# Patient Record
Sex: FEMALE | Race: BLACK OR AFRICAN AMERICAN | Employment: OTHER | URBAN - METROPOLITAN AREA
[De-identification: names, ages, dates, MRNs, and addresses within clinical notes are randomized per-mention and may not be internally consistent; named-entity substitution may affect disease eponyms.]

---

## 2017-01-27 PROBLEM — A41.9 LINE SEPSIS ASSOCIATED WITH DIALYSIS CATHETER (HCC): Status: ACTIVE | Noted: 2017-01-27

## 2017-01-27 PROBLEM — I15.0 RENOVASCULAR HYPERTENSION: Chronic | Status: ACTIVE | Noted: 2017-01-27

## 2017-01-27 PROBLEM — T82.7XXA LINE SEPSIS ASSOCIATED WITH DIALYSIS CATHETER (HCC): Status: ACTIVE | Noted: 2017-01-27

## 2017-02-08 PROBLEM — E10.10 DIABETIC KETOACIDOSIS WITHOUT COMA ASSOCIATED WITH TYPE 1 DIABETES MELLITUS (HCC): Status: ACTIVE | Noted: 2017-02-08

## 2017-10-17 PROBLEM — J81.1 PULMONARY EDEMA: Status: ACTIVE | Noted: 2017-10-17

## 2017-10-17 PROBLEM — T82.7XXA LINE SEPSIS ASSOCIATED WITH DIALYSIS CATHETER (HCC): Status: RESOLVED | Noted: 2017-01-27 | Resolved: 2017-10-17

## 2017-10-17 PROBLEM — A41.9 LINE SEPSIS ASSOCIATED WITH DIALYSIS CATHETER (HCC): Status: RESOLVED | Noted: 2017-01-27 | Resolved: 2017-10-17

## 2017-10-17 PROBLEM — I10 HTN (HYPERTENSION), BENIGN: Chronic | Status: ACTIVE | Noted: 2017-10-17

## 2018-02-19 PROBLEM — I77.0 AVF (ARTERIOVENOUS FISTULA) (HCC): Chronic | Status: ACTIVE | Noted: 2018-02-19

## 2018-03-30 ENCOUNTER — HOSPITAL ENCOUNTER (EMERGENCY)
Age: 33
Discharge: HOME | End: 2018-03-30
Attending: EMERGENCY MEDICINE
Payer: MEDICARE

## 2018-03-30 VITALS
WEIGHT: 135 LBS | TEMPERATURE: 97.8 F | RESPIRATION RATE: 18 BRPM | HEIGHT: 59 IN | DIASTOLIC BLOOD PRESSURE: 96 MMHG | OXYGEN SATURATION: 97 % | HEART RATE: 94 BPM | BODY MASS INDEX: 27.21 KG/M2 | SYSTOLIC BLOOD PRESSURE: 179 MMHG

## 2018-03-30 DIAGNOSIS — L02.91 ABSCESS: Primary | ICD-10-CM

## 2018-03-30 DIAGNOSIS — N72 CERVICITIS: ICD-10-CM

## 2018-03-30 LAB
CLUE CELLS: NORMAL
SOURCE WET PREP: NORMAL
TRICHOMONAS PREP: NORMAL
YEAST WET PREP: NORMAL

## 2018-03-30 PROCEDURE — 90715 TDAP VACCINE 7 YRS/> IM: CPT | Performed by: EMERGENCY MEDICINE

## 2018-03-30 PROCEDURE — 87491 CHLMYD TRACH DNA AMP PROBE: CPT

## 2018-03-30 PROCEDURE — 2500000003 HC RX 250 WO HCPCS: Performed by: EMERGENCY MEDICINE

## 2018-03-30 PROCEDURE — 87205 SMEAR GRAM STAIN: CPT

## 2018-03-30 PROCEDURE — 87186 SC STD MICRODIL/AGAR DIL: CPT

## 2018-03-30 PROCEDURE — 6370000000 HC RX 637 (ALT 250 FOR IP): Performed by: EMERGENCY MEDICINE

## 2018-03-30 PROCEDURE — 96372 THER/PROPH/DIAG INJ SC/IM: CPT

## 2018-03-30 PROCEDURE — 6360000002 HC RX W HCPCS: Performed by: EMERGENCY MEDICINE

## 2018-03-30 PROCEDURE — 99282 EMERGENCY DEPT VISIT SF MDM: CPT

## 2018-03-30 PROCEDURE — 10061 I&D ABSCESS COMP/MULTIPLE: CPT

## 2018-03-30 PROCEDURE — 2500000003 HC RX 250 WO HCPCS

## 2018-03-30 PROCEDURE — 87210 SMEAR WET MOUNT SALINE/INK: CPT

## 2018-03-30 PROCEDURE — 90471 IMMUNIZATION ADMIN: CPT | Performed by: EMERGENCY MEDICINE

## 2018-03-30 PROCEDURE — 87077 CULTURE AEROBIC IDENTIFY: CPT

## 2018-03-30 PROCEDURE — 87070 CULTURE OTHR SPECIMN AEROBIC: CPT

## 2018-03-30 PROCEDURE — 87591 N.GONORRHOEAE DNA AMP PROB: CPT

## 2018-03-30 RX ORDER — METRONIDAZOLE 500 MG/1
500 TABLET ORAL 2 TIMES DAILY
Qty: 14 TABLET | Refills: 0 | Status: SHIPPED | OUTPATIENT
Start: 2018-03-30 | End: 2018-04-06

## 2018-03-30 RX ORDER — AZITHROMYCIN 250 MG/1
1000 TABLET, FILM COATED ORAL ONCE
Status: COMPLETED | OUTPATIENT
Start: 2018-03-30 | End: 2018-03-30

## 2018-03-30 RX ORDER — ACETAMINOPHEN 325 MG/1
650 TABLET ORAL ONCE
Status: COMPLETED | OUTPATIENT
Start: 2018-03-30 | End: 2018-03-30

## 2018-03-30 RX ORDER — LIDOCAINE HYDROCHLORIDE 10 MG/ML
INJECTION, SOLUTION INFILTRATION; PERINEURAL
Status: COMPLETED
Start: 2018-03-30 | End: 2018-03-30

## 2018-03-30 RX ADMIN — LIDOCAINE HYDROCHLORIDE: 10 INJECTION, SOLUTION INFILTRATION; PERINEURAL at 17:40

## 2018-03-30 RX ADMIN — TETANUS TOXOID, REDUCED DIPHTHERIA TOXOID AND ACELLULAR PERTUSSIS VACCINE, ADSORBED 0.5 ML: 5; 2.5; 8; 8; 2.5 SUSPENSION INTRAMUSCULAR at 18:16

## 2018-03-30 RX ADMIN — AZITHROMYCIN 1000 MG: 250 TABLET, FILM COATED ORAL at 18:15

## 2018-03-30 RX ADMIN — CEFTRIAXONE SODIUM 250 MG: 250 INJECTION, POWDER, FOR SOLUTION INTRAMUSCULAR; INTRAVENOUS at 18:15

## 2018-03-30 RX ADMIN — ACETAMINOPHEN 650 MG: 325 TABLET ORAL at 18:14

## 2018-03-30 ASSESSMENT — ENCOUNTER SYMPTOMS
BACK PAIN: 0
WHEEZING: 0
EYE DISCHARGE: 0
SORE THROAT: 0
EYE REDNESS: 0
SHORTNESS OF BREATH: 0
NAUSEA: 0
VOMITING: 0
DIARRHEA: 0
EYE PAIN: 0
SINUS PRESSURE: 0
COUGH: 0
ABDOMINAL DISTENTION: 0

## 2018-03-30 ASSESSMENT — PAIN SCALES - GENERAL
PAINLEVEL_OUTOF10: 10
PAINLEVEL_OUTOF10: 6
PAINLEVEL_OUTOF10: 10

## 2018-03-30 ASSESSMENT — PAIN DESCRIPTION - PAIN TYPE: TYPE: ACUTE PAIN

## 2018-04-01 ENCOUNTER — APPOINTMENT (OUTPATIENT)
Dept: GENERAL RADIOLOGY | Age: 33
DRG: 871 | End: 2018-04-01
Payer: MEDICARE

## 2018-04-01 ENCOUNTER — HOSPITAL ENCOUNTER (INPATIENT)
Age: 33
LOS: 1 days | Discharge: LEFT BEFORE DISCHARGE | DRG: 871 | End: 2018-04-02
Attending: EMERGENCY MEDICINE | Admitting: INTERNAL MEDICINE
Payer: MEDICARE

## 2018-04-01 DIAGNOSIS — E87.5 HYPERKALEMIA: Primary | ICD-10-CM

## 2018-04-01 DIAGNOSIS — R73.9 HYPERGLYCEMIA: ICD-10-CM

## 2018-04-01 DIAGNOSIS — N18.6 CHRONIC KIDNEY DISEASE WITH END STAGE RENAL FAILURE ON DIALYSIS (HCC): ICD-10-CM

## 2018-04-01 DIAGNOSIS — Z99.2 CHRONIC KIDNEY DISEASE WITH END STAGE RENAL FAILURE ON DIALYSIS (HCC): ICD-10-CM

## 2018-04-01 PROBLEM — E11.00 TYPE 2 DIABETES MELLITUS WITH HYPEROSMOLAR NONKETOTIC HYPERGLYCEMIA (HCC): Status: ACTIVE | Noted: 2018-04-01

## 2018-04-01 LAB
ACETAMINOPHEN LEVEL: <15 MCG/ML (ref 10–30)
ALBUMIN SERPL-MCNC: 3.5 G/DL (ref 3.5–5.2)
ALP BLD-CCNC: 110 U/L (ref 35–104)
ALT SERPL-CCNC: 10 U/L (ref 0–32)
ANION GAP SERPL CALCULATED.3IONS-SCNC: 17 MMOL/L (ref 7–16)
ANION GAP SERPL CALCULATED.3IONS-SCNC: 19 MMOL/L (ref 7–16)
AST SERPL-CCNC: 17 U/L (ref 0–31)
BASOPHILS ABSOLUTE: 0.01 E9/L (ref 0–0.2)
BASOPHILS RELATIVE PERCENT: 0.2 % (ref 0–2)
BETA-HYDROXYBUTYRATE: 1.24 MMOL/L (ref 0.02–0.27)
BILIRUB SERPL-MCNC: 0.5 MG/DL (ref 0–1.2)
BUN BLDV-MCNC: 54 MG/DL (ref 6–20)
BUN BLDV-MCNC: 55 MG/DL (ref 6–20)
CALCIUM SERPL-MCNC: 8.5 MG/DL (ref 8.6–10.2)
CALCIUM SERPL-MCNC: 9.2 MG/DL (ref 8.6–10.2)
CHLORIDE BLD-SCNC: 74 MMOL/L (ref 98–107)
CHLORIDE BLD-SCNC: 81 MMOL/L (ref 98–107)
CHP ED QC CHECK: NORMAL
CHP ED QC CHECK: YES
CK MB: 1.8 NG/ML (ref 0–4.3)
CO2: 22 MMOL/L (ref 22–29)
CO2: 24 MMOL/L (ref 22–29)
CREAT SERPL-MCNC: 13.3 MG/DL (ref 0.5–1)
CREAT SERPL-MCNC: 13.6 MG/DL (ref 0.5–1)
EKG ATRIAL RATE: 89 BPM
EKG P AXIS: 62 DEGREES
EKG P-R INTERVAL: 142 MS
EKG Q-T INTERVAL: 374 MS
EKG QRS DURATION: 76 MS
EKG QTC CALCULATION (BAZETT): 455 MS
EKG R AXIS: 3 DEGREES
EKG T AXIS: 75 DEGREES
EKG VENTRICULAR RATE: 89 BPM
EOSINOPHILS ABSOLUTE: 0.08 E9/L (ref 0.05–0.5)
EOSINOPHILS RELATIVE PERCENT: 1.5 % (ref 0–6)
ETHANOL: <10 MG/DL (ref 0–0.08)
GFR AFRICAN AMERICAN: 4
GFR AFRICAN AMERICAN: 4
GFR NON-AFRICAN AMERICAN: 4 ML/MIN/1.73
GFR NON-AFRICAN AMERICAN: 4 ML/MIN/1.73
GLUCOSE BLD-MCNC: 1218 MG/DL (ref 74–109)
GLUCOSE BLD-MCNC: 621 MG/DL (ref 74–109)
GLUCOSE BLD-MCNC: NORMAL MG/DL
GLUCOSE BLD-MCNC: NORMAL MG/DL
HBA1C MFR BLD: 9.6 % (ref 4.8–5.9)
HCT VFR BLD CALC: 30.6 % (ref 34–48)
HEMOGLOBIN: 10.9 G/DL (ref 11.5–15.5)
IMMATURE GRANULOCYTES #: 0.01 E9/L
IMMATURE GRANULOCYTES %: 0.2 % (ref 0–5)
IRON SATURATION: 18 % (ref 15–50)
IRON: 26 MCG/DL (ref 37–145)
LACTIC ACID: 1.3 MMOL/L (ref 0.5–2.2)
LYMPHOCYTES ABSOLUTE: 0.83 E9/L (ref 1.5–4)
LYMPHOCYTES RELATIVE PERCENT: 15.6 % (ref 20–42)
MAGNESIUM: 2.2 MG/DL (ref 1.6–2.6)
MCH RBC QN AUTO: 31 PG (ref 26–35)
MCHC RBC AUTO-ENTMCNC: 35.6 % (ref 32–34.5)
MCV RBC AUTO: 86.9 FL (ref 80–99.9)
METER GLUCOSE: 106 MG/DL (ref 70–110)
METER GLUCOSE: 120 MG/DL (ref 70–110)
METER GLUCOSE: 155 MG/DL (ref 70–110)
METER GLUCOSE: 265 MG/DL (ref 70–110)
METER GLUCOSE: 424 MG/DL (ref 70–110)
METER GLUCOSE: >500 MG/DL (ref 70–110)
MONOCYTES ABSOLUTE: 0.38 E9/L (ref 0.1–0.95)
MONOCYTES RELATIVE PERCENT: 7.1 % (ref 2–12)
NEUTROPHILS ABSOLUTE: 4.01 E9/L (ref 1.8–7.3)
NEUTROPHILS RELATIVE PERCENT: 75.4 % (ref 43–80)
OSMOLALITY: 321 MOSM/KG (ref 285–310)
PDW BLD-RTO: 14.9 FL (ref 11.5–15)
PHOSPHORUS: 4.9 MG/DL (ref 2.5–4.5)
PLATELET # BLD: 201 E9/L (ref 130–450)
PMV BLD AUTO: 9.6 FL (ref 7–12)
POTASSIUM REFLEX MAGNESIUM: 4.6 MMOL/L (ref 3.5–5)
POTASSIUM SERPL-SCNC: 6.9 MMOL/L (ref 3.5–5)
PROCALCITONIN: 2.46 NG/ML (ref 0–0.08)
RBC # BLD: 3.52 E12/L (ref 3.5–5.5)
SALICYLATE, SERUM: <0.3 MG/DL (ref 0–30)
SODIUM BLD-SCNC: 113 MMOL/L (ref 132–146)
SODIUM BLD-SCNC: 124 MMOL/L (ref 132–146)
TOTAL CK: 244 U/L (ref 20–180)
TOTAL IRON BINDING CAPACITY: 143 MCG/DL (ref 250–450)
TOTAL PROTEIN: 7.7 G/DL (ref 6.4–8.3)
TRICYCLIC ANTIDEPRESSANTS SCREEN SERUM: NEGATIVE NG/ML
TROPONIN: 0.02 NG/ML (ref 0–0.03)
TROPONIN: 0.02 NG/ML (ref 0–0.03)
TROPONIN: 0.03 NG/ML (ref 0–0.03)
WBC # BLD: 5.3 E9/L (ref 4.5–11.5)

## 2018-04-01 PROCEDURE — 6360000002 HC RX W HCPCS: Performed by: INTERNAL MEDICINE

## 2018-04-01 PROCEDURE — 84484 ASSAY OF TROPONIN QUANT: CPT

## 2018-04-01 PROCEDURE — 6370000000 HC RX 637 (ALT 250 FOR IP): Performed by: EMERGENCY MEDICINE

## 2018-04-01 PROCEDURE — 96375 TX/PRO/DX INJ NEW DRUG ADDON: CPT

## 2018-04-01 PROCEDURE — 83735 ASSAY OF MAGNESIUM: CPT

## 2018-04-01 PROCEDURE — 82550 ASSAY OF CK (CPK): CPT

## 2018-04-01 PROCEDURE — 83550 IRON BINDING TEST: CPT

## 2018-04-01 PROCEDURE — 5A1D70Z PERFORMANCE OF URINARY FILTRATION, INTERMITTENT, LESS THAN 6 HOURS PER DAY: ICD-10-PCS | Performed by: INTERNAL MEDICINE

## 2018-04-01 PROCEDURE — 82553 CREATINE MB FRACTION: CPT

## 2018-04-01 PROCEDURE — 90935 HEMODIALYSIS ONE EVALUATION: CPT | Performed by: INTERNAL MEDICINE

## 2018-04-01 PROCEDURE — 36415 COLL VENOUS BLD VENIPUNCTURE: CPT

## 2018-04-01 PROCEDURE — 83540 ASSAY OF IRON: CPT

## 2018-04-01 PROCEDURE — 87040 BLOOD CULTURE FOR BACTERIA: CPT

## 2018-04-01 PROCEDURE — 2580000003 HC RX 258: Performed by: INTERNAL MEDICINE

## 2018-04-01 PROCEDURE — 83930 ASSAY OF BLOOD OSMOLALITY: CPT

## 2018-04-01 PROCEDURE — 6370000000 HC RX 637 (ALT 250 FOR IP): Performed by: INTERNAL MEDICINE

## 2018-04-01 PROCEDURE — 99223 1ST HOSP IP/OBS HIGH 75: CPT | Performed by: INTERNAL MEDICINE

## 2018-04-01 PROCEDURE — 99285 EMERGENCY DEPT VISIT HI MDM: CPT

## 2018-04-01 PROCEDURE — 96374 THER/PROPH/DIAG INJ IV PUSH: CPT

## 2018-04-01 PROCEDURE — 80048 BASIC METABOLIC PNL TOTAL CA: CPT

## 2018-04-01 PROCEDURE — 2000000000 HC ICU R&B

## 2018-04-01 PROCEDURE — 84145 PROCALCITONIN (PCT): CPT

## 2018-04-01 PROCEDURE — 83036 HEMOGLOBIN GLYCOSYLATED A1C: CPT

## 2018-04-01 PROCEDURE — 80307 DRUG TEST PRSMV CHEM ANLYZR: CPT

## 2018-04-01 PROCEDURE — 82010 KETONE BODYS QUAN: CPT

## 2018-04-01 PROCEDURE — 82962 GLUCOSE BLOOD TEST: CPT

## 2018-04-01 PROCEDURE — 6360000002 HC RX W HCPCS: Performed by: EMERGENCY MEDICINE

## 2018-04-01 PROCEDURE — 93005 ELECTROCARDIOGRAM TRACING: CPT | Performed by: EMERGENCY MEDICINE

## 2018-04-01 PROCEDURE — 2500000003 HC RX 250 WO HCPCS: Performed by: EMERGENCY MEDICINE

## 2018-04-01 PROCEDURE — 83605 ASSAY OF LACTIC ACID: CPT

## 2018-04-01 PROCEDURE — 84100 ASSAY OF PHOSPHORUS: CPT

## 2018-04-01 PROCEDURE — 80053 COMPREHEN METABOLIC PANEL: CPT

## 2018-04-01 PROCEDURE — 2580000003 HC RX 258: Performed by: EMERGENCY MEDICINE

## 2018-04-01 PROCEDURE — 85025 COMPLETE CBC W/AUTO DIFF WBC: CPT

## 2018-04-01 PROCEDURE — 71045 X-RAY EXAM CHEST 1 VIEW: CPT

## 2018-04-01 RX ORDER — SODIUM CHLORIDE 0.9 % (FLUSH) 0.9 %
10 SYRINGE (ML) INJECTION EVERY 12 HOURS SCHEDULED
Status: DISCONTINUED | OUTPATIENT
Start: 2018-04-01 | End: 2018-04-02 | Stop reason: HOSPADM

## 2018-04-01 RX ORDER — HEPARIN SODIUM 10000 [USP'U]/ML
5000 INJECTION, SOLUTION INTRAVENOUS; SUBCUTANEOUS EVERY 8 HOURS SCHEDULED
Status: DISCONTINUED | OUTPATIENT
Start: 2018-04-01 | End: 2018-04-02 | Stop reason: HOSPADM

## 2018-04-01 RX ORDER — DEXTROSE MONOHYDRATE 50 MG/ML
100 INJECTION, SOLUTION INTRAVENOUS PRN
Status: DISCONTINUED | OUTPATIENT
Start: 2018-04-01 | End: 2018-04-02 | Stop reason: HOSPADM

## 2018-04-01 RX ORDER — ACETAMINOPHEN 325 MG/1
650 TABLET ORAL EVERY 4 HOURS PRN
Status: DISCONTINUED | OUTPATIENT
Start: 2018-04-01 | End: 2018-04-02 | Stop reason: HOSPADM

## 2018-04-01 RX ORDER — ONDANSETRON 2 MG/ML
4 INJECTION INTRAMUSCULAR; INTRAVENOUS ONCE
Status: COMPLETED | OUTPATIENT
Start: 2018-04-01 | End: 2018-04-01

## 2018-04-01 RX ORDER — AMLODIPINE BESYLATE 5 MG/1
5 TABLET ORAL DAILY
Status: DISCONTINUED | OUTPATIENT
Start: 2018-04-01 | End: 2018-04-02 | Stop reason: HOSPADM

## 2018-04-01 RX ORDER — NICOTINE POLACRILEX 4 MG
15 LOZENGE BUCCAL PRN
Status: DISCONTINUED | OUTPATIENT
Start: 2018-04-01 | End: 2018-04-02 | Stop reason: HOSPADM

## 2018-04-01 RX ORDER — ONDANSETRON 2 MG/ML
4 INJECTION INTRAMUSCULAR; INTRAVENOUS EVERY 6 HOURS PRN
Status: DISCONTINUED | OUTPATIENT
Start: 2018-04-01 | End: 2018-04-02 | Stop reason: HOSPADM

## 2018-04-01 RX ORDER — DEXTROSE AND SODIUM CHLORIDE 5; .45 G/100ML; G/100ML
INJECTION, SOLUTION INTRAVENOUS PRN
Status: DISCONTINUED | OUTPATIENT
Start: 2018-04-01 | End: 2018-04-02 | Stop reason: HOSPADM

## 2018-04-01 RX ORDER — SODIUM CHLORIDE 0.9 % (FLUSH) 0.9 %
10 SYRINGE (ML) INJECTION PRN
Status: DISCONTINUED | OUTPATIENT
Start: 2018-04-01 | End: 2018-04-02 | Stop reason: HOSPADM

## 2018-04-01 RX ORDER — DEXTROSE MONOHYDRATE 25 G/50ML
12.5 INJECTION, SOLUTION INTRAVENOUS PRN
Status: DISCONTINUED | OUTPATIENT
Start: 2018-04-01 | End: 2018-04-02 | Stop reason: HOSPADM

## 2018-04-01 RX ORDER — CARVEDILOL 25 MG/1
25 TABLET ORAL 2 TIMES DAILY WITH MEALS
Status: DISCONTINUED | OUTPATIENT
Start: 2018-04-01 | End: 2018-04-02 | Stop reason: HOSPADM

## 2018-04-01 RX ORDER — CALCIUM GLUCONATE 94 MG/ML
1 INJECTION, SOLUTION INTRAVENOUS ONCE
Status: COMPLETED | OUTPATIENT
Start: 2018-04-01 | End: 2018-04-01

## 2018-04-01 RX ORDER — INSULIN GLARGINE 100 [IU]/ML
15 INJECTION, SOLUTION SUBCUTANEOUS NIGHTLY
Status: DISCONTINUED | OUTPATIENT
Start: 2018-04-01 | End: 2018-04-02 | Stop reason: HOSPADM

## 2018-04-01 RX ORDER — CHOLECALCIFEROL (VITAMIN D3) 10 MCG
1 TABLET ORAL DAILY
Status: DISCONTINUED | OUTPATIENT
Start: 2018-04-01 | End: 2018-04-02 | Stop reason: HOSPADM

## 2018-04-01 RX ORDER — HEPARIN SODIUM 1000 [USP'U]/ML
4300 INJECTION, SOLUTION INTRAVENOUS; SUBCUTANEOUS PRN
Status: DISCONTINUED | OUTPATIENT
Start: 2018-04-01 | End: 2018-04-02 | Stop reason: HOSPADM

## 2018-04-01 RX ORDER — CHOLECALCIFEROL (VITAMIN D3) 50 MCG
2000 TABLET ORAL DAILY
Status: DISCONTINUED | OUTPATIENT
Start: 2018-04-01 | End: 2018-04-02 | Stop reason: HOSPADM

## 2018-04-01 RX ORDER — SODIUM CHLORIDE 9 MG/ML
INJECTION, SOLUTION INTRAVENOUS CONTINUOUS
Status: DISCONTINUED | OUTPATIENT
Start: 2018-04-01 | End: 2018-04-02 | Stop reason: HOSPADM

## 2018-04-01 RX ORDER — FAMOTIDINE 20 MG/1
20 TABLET, FILM COATED ORAL DAILY
Status: DISCONTINUED | OUTPATIENT
Start: 2018-04-01 | End: 2018-04-02 | Stop reason: HOSPADM

## 2018-04-01 RX ADMIN — INSULIN HUMAN 6 UNITS: 100 INJECTION, SOLUTION PARENTERAL at 12:52

## 2018-04-01 RX ADMIN — AMLODIPINE BESYLATE 5 MG: 5 TABLET ORAL at 17:02

## 2018-04-01 RX ADMIN — INSULIN GLARGINE 15 UNITS: 100 INJECTION, SOLUTION SUBCUTANEOUS at 20:36

## 2018-04-01 RX ADMIN — FAMOTIDINE 20 MG: 20 TABLET ORAL at 17:02

## 2018-04-01 RX ADMIN — HEPARIN SODIUM 5000 UNITS: 10000 INJECTION, SOLUTION INTRAVENOUS; SUBCUTANEOUS at 23:17

## 2018-04-01 RX ADMIN — CARVEDILOL 25 MG: 25 TABLET, FILM COATED ORAL at 18:03

## 2018-04-01 RX ADMIN — CALCIUM GLUCONATE 1 G: 98 INJECTION, SOLUTION INTRAVENOUS at 12:20

## 2018-04-01 RX ADMIN — HEPARIN SODIUM 4300 UNITS: 1000 INJECTION, SOLUTION INTRAVENOUS; SUBCUTANEOUS at 23:50

## 2018-04-01 RX ADMIN — Medication 10 ML: at 17:02

## 2018-04-01 RX ADMIN — VANCOMYCIN HYDROCHLORIDE 1250 MG: 1 INJECTION, POWDER, LYOPHILIZED, FOR SOLUTION INTRAVENOUS at 18:35

## 2018-04-01 RX ADMIN — SODIUM CHLORIDE: 9 INJECTION, SOLUTION INTRAVENOUS at 20:37

## 2018-04-01 RX ADMIN — ONDANSETRON 4 MG: 2 INJECTION INTRAMUSCULAR; INTRAVENOUS at 11:41

## 2018-04-01 RX ADMIN — SODIUM CHLORIDE 6 UNITS/HR: 9 INJECTION, SOLUTION INTRAVENOUS at 12:54

## 2018-04-01 RX ADMIN — WATER 1 G: 1 INJECTION INTRAMUSCULAR; INTRAVENOUS; SUBCUTANEOUS at 18:05

## 2018-04-01 RX ADMIN — SODIUM BICARBONATE 50 MEQ: 84 INJECTION, SOLUTION INTRAVENOUS at 12:52

## 2018-04-01 ASSESSMENT — PAIN SCALES - GENERAL
PAINLEVEL_OUTOF10: 0

## 2018-04-02 ENCOUNTER — APPOINTMENT (OUTPATIENT)
Dept: GENERAL RADIOLOGY | Age: 33
DRG: 871 | End: 2018-04-02
Payer: MEDICARE

## 2018-04-02 VITALS
WEIGHT: 136.69 LBS | HEART RATE: 97 BPM | DIASTOLIC BLOOD PRESSURE: 105 MMHG | SYSTOLIC BLOOD PRESSURE: 166 MMHG | RESPIRATION RATE: 17 BRPM | BODY MASS INDEX: 27.56 KG/M2 | OXYGEN SATURATION: 97 % | HEIGHT: 59 IN | TEMPERATURE: 99 F

## 2018-04-02 ASSESSMENT — PAIN SCALES - GENERAL: PAINLEVEL_OUTOF10: 0

## 2018-04-03 LAB
GRAM STAIN RESULT: ABNORMAL
ORGANISM: ABNORMAL
WOUND/ABSCESS: ABNORMAL

## 2018-04-05 LAB
CHLAMYDIA TRACHOMATIS AMPLIFIED DET: NORMAL
N GONORRHOEAE AMPLIFIED DET: NORMAL

## 2018-04-06 LAB
BLOOD CULTURE, ROUTINE: NORMAL
CULTURE, BLOOD 2: NORMAL

## 2018-04-17 ENCOUNTER — HOSPITAL ENCOUNTER (OUTPATIENT)
Age: 33
Discharge: HOME | End: 2018-04-17
Payer: COMMERCIAL

## 2018-04-17 PROCEDURE — 84132 ASSAY OF SERUM POTASSIUM: CPT

## 2018-04-17 PROCEDURE — 36415 COLL VENOUS BLD VENIPUNCTURE: CPT

## 2018-04-20 LAB — POTASSIUM SERPL-SCNC: 3.7 MMOL/L (ref 3.5–5)

## 2018-05-07 ENCOUNTER — HOSPITAL ENCOUNTER (INPATIENT)
Age: 33
LOS: 6 days | Discharge: HOME | DRG: 264 | End: 2018-05-13
Attending: EMERGENCY MEDICINE | Admitting: INTERNAL MEDICINE
Payer: MEDICARE

## 2018-05-07 ENCOUNTER — APPOINTMENT (OUTPATIENT)
Dept: ULTRASOUND IMAGING | Age: 33
DRG: 264 | End: 2018-05-07
Payer: MEDICARE

## 2018-05-07 ENCOUNTER — APPOINTMENT (OUTPATIENT)
Dept: GENERAL RADIOLOGY | Age: 33
DRG: 264 | End: 2018-05-07
Payer: MEDICARE

## 2018-05-07 DIAGNOSIS — N18.6 ESRD ON DIALYSIS (HCC): ICD-10-CM

## 2018-05-07 DIAGNOSIS — T80.219A CENTRAL VENOUS LINE INFECTION, INITIAL ENCOUNTER: Primary | ICD-10-CM

## 2018-05-07 DIAGNOSIS — Z99.2 ESRD ON DIALYSIS (HCC): ICD-10-CM

## 2018-05-07 PROBLEM — I77.0 AVF (ARTERIOVENOUS FISTULA) (HCC): Chronic | Status: RESOLVED | Noted: 2018-02-19 | Resolved: 2018-05-07

## 2018-05-07 PROBLEM — R73.9 HYPERGLYCEMIA: Status: RESOLVED | Noted: 2018-04-01 | Resolved: 2018-05-07

## 2018-05-07 PROBLEM — A41.9 SEPSIS (HCC): Status: ACTIVE | Noted: 2018-05-07

## 2018-05-07 PROBLEM — E11.00 TYPE 2 DIABETES MELLITUS WITH HYPEROSMOLAR NONKETOTIC HYPERGLYCEMIA (HCC): Status: RESOLVED | Noted: 2018-04-01 | Resolved: 2018-05-07

## 2018-05-07 PROBLEM — J81.1 PULMONARY EDEMA: Status: RESOLVED | Noted: 2017-10-17 | Resolved: 2018-05-07

## 2018-05-07 PROBLEM — I82.721 CHRONIC DEEP VEIN THROMBOSIS (DVT) OF RIGHT UPPER EXTREMITY (HCC): Chronic | Status: ACTIVE | Noted: 2018-05-07

## 2018-05-07 PROBLEM — I15.0 RENOVASCULAR HYPERTENSION: Chronic | Status: RESOLVED | Noted: 2017-01-27 | Resolved: 2018-05-07

## 2018-05-07 LAB
ANION GAP SERPL CALCULATED.3IONS-SCNC: 16 MMOL/L (ref 7–16)
APTT: 33.3 SEC (ref 24.5–35.1)
BASOPHILS ABSOLUTE: 0.01 E9/L (ref 0–0.2)
BASOPHILS RELATIVE PERCENT: 0.2 % (ref 0–2)
BUN BLDV-MCNC: 46 MG/DL (ref 6–20)
C-REACTIVE PROTEIN: 1.2 MG/DL (ref 0–0.4)
CALCIUM SERPL-MCNC: 9.4 MG/DL (ref 8.6–10.2)
CHLORIDE BLD-SCNC: 94 MMOL/L (ref 98–107)
CO2: 28 MMOL/L (ref 22–29)
CREAT SERPL-MCNC: 13.1 MG/DL (ref 0.5–1)
EOSINOPHILS ABSOLUTE: 0.23 E9/L (ref 0.05–0.5)
EOSINOPHILS RELATIVE PERCENT: 3.8 % (ref 0–6)
GFR AFRICAN AMERICAN: 4
GFR NON-AFRICAN AMERICAN: 4 ML/MIN/1.73
GLUCOSE BLD-MCNC: 171 MG/DL (ref 74–109)
HBA1C MFR BLD: 9.7 % (ref 4.8–5.9)
HCT VFR BLD CALC: 28.6 % (ref 34–48)
HCT VFR BLD CALC: 30.4 % (ref 34–48)
HEMOGLOBIN: 10.5 G/DL (ref 11.5–15.5)
HEMOGLOBIN: 9.8 G/DL (ref 11.5–15.5)
IMMATURE GRANULOCYTES #: 0.03 E9/L
IMMATURE GRANULOCYTES %: 0.5 % (ref 0–5)
LYMPHOCYTES ABSOLUTE: 1.87 E9/L (ref 1.5–4)
LYMPHOCYTES RELATIVE PERCENT: 31.2 % (ref 20–42)
MCH RBC QN AUTO: 30 PG (ref 26–35)
MCH RBC QN AUTO: 30.5 PG (ref 26–35)
MCHC RBC AUTO-ENTMCNC: 34.3 % (ref 32–34.5)
MCHC RBC AUTO-ENTMCNC: 34.5 % (ref 32–34.5)
MCV RBC AUTO: 87.5 FL (ref 80–99.9)
MCV RBC AUTO: 88.4 FL (ref 80–99.9)
METER GLUCOSE: 154 MG/DL (ref 70–110)
METER GLUCOSE: 282 MG/DL (ref 70–110)
MONOCYTES ABSOLUTE: 0.39 E9/L (ref 0.1–0.95)
MONOCYTES RELATIVE PERCENT: 6.5 % (ref 2–12)
NEUTROPHILS ABSOLUTE: 3.46 E9/L (ref 1.8–7.3)
NEUTROPHILS RELATIVE PERCENT: 57.8 % (ref 43–80)
PDW BLD-RTO: 15.1 FL (ref 11.5–15)
PDW BLD-RTO: 15.2 FL (ref 11.5–15)
PLATELET # BLD: 263 E9/L (ref 130–450)
PLATELET # BLD: 293 E9/L (ref 130–450)
PMV BLD AUTO: 10.2 FL (ref 7–12)
PMV BLD AUTO: 9.3 FL (ref 7–12)
POTASSIUM SERPL-SCNC: 4.7 MMOL/L (ref 3.5–5)
RBC # BLD: 3.27 E12/L (ref 3.5–5.5)
RBC # BLD: 3.44 E12/L (ref 3.5–5.5)
SEDIMENTATION RATE, ERYTHROCYTE: 57 MM/HR (ref 0–20)
SODIUM BLD-SCNC: 138 MMOL/L (ref 132–146)
WBC # BLD: 5.6 E9/L (ref 4.5–11.5)
WBC # BLD: 6 E9/L (ref 4.5–11.5)

## 2018-05-07 PROCEDURE — 2580000003 HC RX 258: Performed by: INTERNAL MEDICINE

## 2018-05-07 PROCEDURE — 99221 1ST HOSP IP/OBS SF/LOW 40: CPT | Performed by: SURGERY

## 2018-05-07 PROCEDURE — 85025 COMPLETE CBC W/AUTO DIFF WBC: CPT

## 2018-05-07 PROCEDURE — 99285 EMERGENCY DEPT VISIT HI MDM: CPT

## 2018-05-07 PROCEDURE — 87077 CULTURE AEROBIC IDENTIFY: CPT

## 2018-05-07 PROCEDURE — 6360000002 HC RX W HCPCS: Performed by: INTERNAL MEDICINE

## 2018-05-07 PROCEDURE — 85651 RBC SED RATE NONAUTOMATED: CPT

## 2018-05-07 PROCEDURE — 85730 THROMBOPLASTIN TIME PARTIAL: CPT

## 2018-05-07 PROCEDURE — 2580000003 HC RX 258: Performed by: PHYSICIAN ASSISTANT

## 2018-05-07 PROCEDURE — 6360000002 HC RX W HCPCS: Performed by: STUDENT IN AN ORGANIZED HEALTH CARE EDUCATION/TRAINING PROGRAM

## 2018-05-07 PROCEDURE — 87040 BLOOD CULTURE FOR BACTERIA: CPT

## 2018-05-07 PROCEDURE — 2060000000 HC ICU INTERMEDIATE R&B

## 2018-05-07 PROCEDURE — 87186 SC STD MICRODIL/AGAR DIL: CPT

## 2018-05-07 PROCEDURE — 6360000002 HC RX W HCPCS: Performed by: PHYSICIAN ASSISTANT

## 2018-05-07 PROCEDURE — 93971 EXTREMITY STUDY: CPT

## 2018-05-07 PROCEDURE — 82962 GLUCOSE BLOOD TEST: CPT

## 2018-05-07 PROCEDURE — 85027 COMPLETE CBC AUTOMATED: CPT

## 2018-05-07 PROCEDURE — 86140 C-REACTIVE PROTEIN: CPT

## 2018-05-07 PROCEDURE — 6370000000 HC RX 637 (ALT 250 FOR IP): Performed by: INTERNAL MEDICINE

## 2018-05-07 PROCEDURE — 71045 X-RAY EXAM CHEST 1 VIEW: CPT

## 2018-05-07 PROCEDURE — 5A1D70Z PERFORMANCE OF URINARY FILTRATION, INTERMITTENT, LESS THAN 6 HOURS PER DAY: ICD-10-PCS | Performed by: INTERNAL MEDICINE

## 2018-05-07 PROCEDURE — 87070 CULTURE OTHR SPECIMN AEROBIC: CPT

## 2018-05-07 PROCEDURE — 83036 HEMOGLOBIN GLYCOSYLATED A1C: CPT

## 2018-05-07 PROCEDURE — 87205 SMEAR GRAM STAIN: CPT

## 2018-05-07 PROCEDURE — 36415 COLL VENOUS BLD VENIPUNCTURE: CPT

## 2018-05-07 PROCEDURE — 80048 BASIC METABOLIC PNL TOTAL CA: CPT

## 2018-05-07 RX ORDER — FAMOTIDINE 20 MG/1
20 TABLET, FILM COATED ORAL EVERY MORNING
Status: DISCONTINUED | OUTPATIENT
Start: 2018-05-08 | End: 2018-05-13 | Stop reason: HOSPADM

## 2018-05-07 RX ORDER — HEPARIN SODIUM 1000 [USP'U]/ML
80 INJECTION, SOLUTION INTRAVENOUS; SUBCUTANEOUS PRN
Status: DISCONTINUED | OUTPATIENT
Start: 2018-05-07 | End: 2018-05-12 | Stop reason: ALTCHOICE

## 2018-05-07 RX ORDER — HEPARIN SODIUM 1000 [USP'U]/ML
80 INJECTION, SOLUTION INTRAVENOUS; SUBCUTANEOUS ONCE
Status: COMPLETED | OUTPATIENT
Start: 2018-05-07 | End: 2018-05-07

## 2018-05-07 RX ORDER — HEPARIN SODIUM 10000 [USP'U]/100ML
18 INJECTION, SOLUTION INTRAVENOUS CONTINUOUS
Status: DISCONTINUED | OUTPATIENT
Start: 2018-05-07 | End: 2018-05-09

## 2018-05-07 RX ORDER — CHOLECALCIFEROL (VITAMIN D3) 10 MCG
1 TABLET ORAL
Status: DISCONTINUED | OUTPATIENT
Start: 2018-05-07 | End: 2018-05-13 | Stop reason: HOSPADM

## 2018-05-07 RX ORDER — CARVEDILOL 25 MG/1
25 TABLET ORAL 2 TIMES DAILY WITH MEALS
Status: DISCONTINUED | OUTPATIENT
Start: 2018-05-07 | End: 2018-05-13 | Stop reason: HOSPADM

## 2018-05-07 RX ORDER — LACTOBACILLUS RHAMNOSUS GG 10B CELL
1 CAPSULE ORAL EVERY MORNING
Status: DISCONTINUED | OUTPATIENT
Start: 2018-05-08 | End: 2018-05-13 | Stop reason: HOSPADM

## 2018-05-07 RX ORDER — HEPARIN SODIUM 1000 [USP'U]/ML
40 INJECTION, SOLUTION INTRAVENOUS; SUBCUTANEOUS PRN
Status: DISCONTINUED | OUTPATIENT
Start: 2018-05-07 | End: 2018-05-12 | Stop reason: ALTCHOICE

## 2018-05-07 RX ORDER — FUROSEMIDE 40 MG/1
80 TABLET ORAL EVERY MORNING
Status: DISCONTINUED | OUTPATIENT
Start: 2018-05-08 | End: 2018-05-13 | Stop reason: HOSPADM

## 2018-05-07 RX ORDER — AMLODIPINE BESYLATE 5 MG/1
5 TABLET ORAL EVERY MORNING
Status: DISCONTINUED | OUTPATIENT
Start: 2018-05-08 | End: 2018-05-13 | Stop reason: HOSPADM

## 2018-05-07 RX ORDER — DEXTROSE MONOHYDRATE 50 MG/ML
100 INJECTION, SOLUTION INTRAVENOUS PRN
Status: DISCONTINUED | OUTPATIENT
Start: 2018-05-07 | End: 2018-05-13 | Stop reason: HOSPADM

## 2018-05-07 RX ORDER — SODIUM CHLORIDE 0.9 % (FLUSH) 0.9 %
10 SYRINGE (ML) INJECTION EVERY 12 HOURS SCHEDULED
Status: DISCONTINUED | OUTPATIENT
Start: 2018-05-07 | End: 2018-05-13 | Stop reason: HOSPADM

## 2018-05-07 RX ORDER — SODIUM CHLORIDE 0.9 % (FLUSH) 0.9 %
10 SYRINGE (ML) INJECTION PRN
Status: DISCONTINUED | OUTPATIENT
Start: 2018-05-07 | End: 2018-05-13 | Stop reason: HOSPADM

## 2018-05-07 RX ORDER — DEXTROSE MONOHYDRATE 25 G/50ML
12.5 INJECTION, SOLUTION INTRAVENOUS PRN
Status: DISCONTINUED | OUTPATIENT
Start: 2018-05-07 | End: 2018-05-13 | Stop reason: HOSPADM

## 2018-05-07 RX ORDER — CHOLECALCIFEROL (VITAMIN D3) 125 MCG
1 CAPSULE ORAL EVERY MORNING
COMMUNITY
End: 2021-01-27

## 2018-05-07 RX ORDER — HEPARIN SODIUM 10000 [USP'U]/ML
5000 INJECTION, SOLUTION INTRAVENOUS; SUBCUTANEOUS EVERY 8 HOURS SCHEDULED
Status: DISCONTINUED | OUTPATIENT
Start: 2018-05-07 | End: 2018-05-07

## 2018-05-07 RX ORDER — CHOLECALCIFEROL (VITAMIN D3) 50 MCG
2000 TABLET ORAL EVERY MORNING
Status: DISCONTINUED | OUTPATIENT
Start: 2018-05-08 | End: 2018-05-13 | Stop reason: HOSPADM

## 2018-05-07 RX ORDER — ONDANSETRON 2 MG/ML
4 INJECTION INTRAMUSCULAR; INTRAVENOUS EVERY 6 HOURS PRN
Status: DISCONTINUED | OUTPATIENT
Start: 2018-05-07 | End: 2018-05-13 | Stop reason: HOSPADM

## 2018-05-07 RX ORDER — NICOTINE POLACRILEX 4 MG
15 LOZENGE BUCCAL PRN
Status: DISCONTINUED | OUTPATIENT
Start: 2018-05-07 | End: 2018-05-13 | Stop reason: HOSPADM

## 2018-05-07 RX ORDER — INSULIN GLARGINE 100 [IU]/ML
24 INJECTION, SOLUTION SUBCUTANEOUS NIGHTLY
Status: DISCONTINUED | OUTPATIENT
Start: 2018-05-07 | End: 2018-05-13 | Stop reason: HOSPADM

## 2018-05-07 RX ADMIN — HEPARIN SODIUM AND DEXTROSE 18 UNITS/KG/HR: 10000; 5 INJECTION INTRAVENOUS at 19:02

## 2018-05-07 RX ADMIN — HEPARIN SODIUM 5000 UNITS: 10000 INJECTION, SOLUTION INTRAVENOUS; SUBCUTANEOUS at 17:44

## 2018-05-07 RX ADMIN — CARVEDILOL 25 MG: 25 TABLET, FILM COATED ORAL at 17:44

## 2018-05-07 RX ADMIN — CEFTRIAXONE SODIUM 2 G: 2 INJECTION, POWDER, FOR SOLUTION INTRAMUSCULAR; INTRAVENOUS at 14:57

## 2018-05-07 RX ADMIN — VANCOMYCIN HYDROCHLORIDE 1.5 G: 10 INJECTION, POWDER, LYOPHILIZED, FOR SOLUTION INTRAVENOUS at 16:28

## 2018-05-07 RX ADMIN — HEPARIN SODIUM 5030 UNITS: 1000 INJECTION, SOLUTION INTRAVENOUS; SUBCUTANEOUS at 19:00

## 2018-05-07 RX ADMIN — NEPHROCAP 1 MG: 1 CAP ORAL at 17:44

## 2018-05-07 RX ADMIN — CALCIUM ACETATE 667 MG: 667 CAPSULE ORAL at 17:44

## 2018-05-07 RX ADMIN — Medication 10 ML: at 16:28

## 2018-05-07 RX ADMIN — INSULIN LISPRO 3 UNITS: 100 INJECTION, SOLUTION INTRAVENOUS; SUBCUTANEOUS at 17:45

## 2018-05-07 RX ADMIN — INSULIN LISPRO 5 UNITS: 100 INJECTION, SOLUTION INTRAVENOUS; SUBCUTANEOUS at 21:47

## 2018-05-07 RX ADMIN — INSULIN GLARGINE 24 UNITS: 100 INJECTION, SOLUTION SUBCUTANEOUS at 21:47

## 2018-05-07 RX ADMIN — Medication 10 ML: at 18:59

## 2018-05-07 ASSESSMENT — PAIN SCALES - GENERAL: PAINLEVEL_OUTOF10: 0

## 2018-05-08 LAB
ALBUMIN SERPL-MCNC: 2.9 G/DL (ref 3.5–5.2)
ALP BLD-CCNC: 75 U/L (ref 35–104)
ALT SERPL-CCNC: <5 U/L (ref 0–32)
ANION GAP SERPL CALCULATED.3IONS-SCNC: 18 MMOL/L (ref 7–16)
APTT: 70.5 SEC (ref 24.5–35.1)
AST SERPL-CCNC: 8 U/L (ref 0–31)
BASOPHILS ABSOLUTE: 0.02 E9/L (ref 0–0.2)
BASOPHILS RELATIVE PERCENT: 0.3 % (ref 0–2)
BILIRUB SERPL-MCNC: 0.2 MG/DL (ref 0–1.2)
BUN BLDV-MCNC: 53 MG/DL (ref 6–20)
CALCIUM SERPL-MCNC: 9.1 MG/DL (ref 8.6–10.2)
CHLORIDE BLD-SCNC: 95 MMOL/L (ref 98–107)
CO2: 25 MMOL/L (ref 22–29)
CREAT SERPL-MCNC: 14.3 MG/DL (ref 0.5–1)
EOSINOPHILS ABSOLUTE: 0.26 E9/L (ref 0.05–0.5)
EOSINOPHILS RELATIVE PERCENT: 4.2 % (ref 0–6)
GFR AFRICAN AMERICAN: 4
GFR NON-AFRICAN AMERICAN: 4 ML/MIN/1.73
GLUCOSE BLD-MCNC: 149 MG/DL (ref 74–109)
GRAM STAIN ORDERABLE: NORMAL
HCT VFR BLD CALC: 29.2 % (ref 34–48)
HEMOGLOBIN: 10.1 G/DL (ref 11.5–15.5)
IMMATURE GRANULOCYTES #: 0.02 E9/L
IMMATURE GRANULOCYTES %: 0.3 % (ref 0–5)
LYMPHOCYTES ABSOLUTE: 2.7 E9/L (ref 1.5–4)
LYMPHOCYTES RELATIVE PERCENT: 43.8 % (ref 20–42)
MAGNESIUM: 2.6 MG/DL (ref 1.6–2.6)
MCH RBC QN AUTO: 30.6 PG (ref 26–35)
MCHC RBC AUTO-ENTMCNC: 34.6 % (ref 32–34.5)
MCV RBC AUTO: 88.5 FL (ref 80–99.9)
METER GLUCOSE: 102 MG/DL (ref 70–110)
METER GLUCOSE: 149 MG/DL (ref 70–110)
METER GLUCOSE: 208 MG/DL (ref 70–110)
METER GLUCOSE: 411 MG/DL (ref 70–110)
MONOCYTES ABSOLUTE: 0.56 E9/L (ref 0.1–0.95)
MONOCYTES RELATIVE PERCENT: 9.1 % (ref 2–12)
NEUTROPHILS ABSOLUTE: 2.61 E9/L (ref 1.8–7.3)
NEUTROPHILS RELATIVE PERCENT: 42.3 % (ref 43–80)
PDW BLD-RTO: 15.1 FL (ref 11.5–15)
PLATELET # BLD: 286 E9/L (ref 130–450)
PMV BLD AUTO: 10.4 FL (ref 7–12)
POTASSIUM SERPL-SCNC: 4.5 MMOL/L (ref 3.5–5)
RBC # BLD: 3.3 E12/L (ref 3.5–5.5)
SODIUM BLD-SCNC: 138 MMOL/L (ref 132–146)
TOTAL PROTEIN: 7 G/DL (ref 6.4–8.3)
WBC # BLD: 6.2 E9/L (ref 4.5–11.5)

## 2018-05-08 PROCEDURE — 90935 HEMODIALYSIS ONE EVALUATION: CPT | Performed by: INTERNAL MEDICINE

## 2018-05-08 PROCEDURE — 6360000002 HC RX W HCPCS: Performed by: SPECIALIST

## 2018-05-08 PROCEDURE — 83735 ASSAY OF MAGNESIUM: CPT

## 2018-05-08 PROCEDURE — 85025 COMPLETE CBC W/AUTO DIFF WBC: CPT

## 2018-05-08 PROCEDURE — 6370000000 HC RX 637 (ALT 250 FOR IP): Performed by: INTERNAL MEDICINE

## 2018-05-08 PROCEDURE — 85730 THROMBOPLASTIN TIME PARTIAL: CPT

## 2018-05-08 PROCEDURE — 80053 COMPREHEN METABOLIC PANEL: CPT

## 2018-05-08 PROCEDURE — 36415 COLL VENOUS BLD VENIPUNCTURE: CPT

## 2018-05-08 PROCEDURE — 2580000003 HC RX 258: Performed by: SPECIALIST

## 2018-05-08 PROCEDURE — 2060000000 HC ICU INTERMEDIATE R&B

## 2018-05-08 PROCEDURE — 82962 GLUCOSE BLOOD TEST: CPT

## 2018-05-08 PROCEDURE — 2580000003 HC RX 258: Performed by: INTERNAL MEDICINE

## 2018-05-08 PROCEDURE — 0WP833Z REMOVAL OF INFUSION DEVICE FROM CHEST WALL, PERCUTANEOUS APPROACH: ICD-10-PCS | Performed by: INTERNAL MEDICINE

## 2018-05-08 RX ADMIN — CEFEPIME HYDROCHLORIDE 2 G: 2 INJECTION, POWDER, FOR SOLUTION INTRAVENOUS at 14:58

## 2018-05-08 RX ADMIN — Medication 10 ML: at 21:24

## 2018-05-08 RX ADMIN — INSULIN LISPRO 18 UNITS: 100 INJECTION, SOLUTION INTRAVENOUS; SUBCUTANEOUS at 16:51

## 2018-05-08 RX ADMIN — Medication 1 CAPSULE: at 15:18

## 2018-05-08 RX ADMIN — INSULIN LISPRO 2 UNITS: 100 INJECTION, SOLUTION INTRAVENOUS; SUBCUTANEOUS at 21:23

## 2018-05-08 RX ADMIN — CALCIUM ACETATE 667 MG: 667 CAPSULE ORAL at 15:18

## 2018-05-08 RX ADMIN — FUROSEMIDE 80 MG: 40 TABLET ORAL at 15:18

## 2018-05-08 RX ADMIN — CALCIUM ACETATE 667 MG: 667 CAPSULE ORAL at 18:49

## 2018-05-08 RX ADMIN — FAMOTIDINE 20 MG: 20 TABLET, FILM COATED ORAL at 15:19

## 2018-05-08 RX ADMIN — CARVEDILOL 25 MG: 25 TABLET, FILM COATED ORAL at 18:49

## 2018-05-08 RX ADMIN — INSULIN GLARGINE 24 UNITS: 100 INJECTION, SOLUTION SUBCUTANEOUS at 21:24

## 2018-05-08 RX ADMIN — Medication 10 ML: at 15:22

## 2018-05-08 RX ADMIN — NEPHROCAP 1 MG: 1 CAP ORAL at 18:49

## 2018-05-08 RX ADMIN — AMLODIPINE BESYLATE 5 MG: 5 TABLET ORAL at 15:18

## 2018-05-08 ASSESSMENT — PAIN SCALES - GENERAL
PAINLEVEL_OUTOF10: 0

## 2018-05-09 ENCOUNTER — ANESTHESIA EVENT (OUTPATIENT)
Dept: OPERATING ROOM | Age: 33
DRG: 264 | End: 2018-05-09
Payer: MEDICARE

## 2018-05-09 LAB
ANION GAP SERPL CALCULATED.3IONS-SCNC: 14 MMOL/L (ref 7–16)
APTT: 52.5 SEC (ref 24.5–35.1)
BUN BLDV-MCNC: 28 MG/DL (ref 6–20)
CALCIUM SERPL-MCNC: 9 MG/DL (ref 8.6–10.2)
CHLORIDE BLD-SCNC: 94 MMOL/L (ref 98–107)
CO2: 27 MMOL/L (ref 22–29)
CREAT SERPL-MCNC: 8 MG/DL (ref 0.5–1)
GFR AFRICAN AMERICAN: 7
GFR NON-AFRICAN AMERICAN: 7 ML/MIN/1.73
GLUCOSE BLD-MCNC: 86 MG/DL (ref 74–109)
HCT VFR BLD CALC: 30.2 % (ref 34–48)
HEMOGLOBIN: 10.6 G/DL (ref 11.5–15.5)
MCH RBC QN AUTO: 30.8 PG (ref 26–35)
MCHC RBC AUTO-ENTMCNC: 35.1 % (ref 32–34.5)
MCV RBC AUTO: 87.8 FL (ref 80–99.9)
METER GLUCOSE: 118 MG/DL (ref 70–110)
METER GLUCOSE: 120 MG/DL (ref 70–110)
METER GLUCOSE: 241 MG/DL (ref 70–110)
METER GLUCOSE: 330 MG/DL (ref 70–110)
METER GLUCOSE: 74 MG/DL (ref 70–110)
ORGANISM: ABNORMAL
PDW BLD-RTO: 15.2 FL (ref 11.5–15)
PHOSPHORUS: 4.4 MG/DL (ref 2.5–4.5)
PLATELET # BLD: 241 E9/L (ref 130–450)
PMV BLD AUTO: 10 FL (ref 7–12)
POTASSIUM SERPL-SCNC: 4.6 MMOL/L (ref 3.5–5)
RBC # BLD: 3.44 E12/L (ref 3.5–5.5)
SODIUM BLD-SCNC: 135 MMOL/L (ref 132–146)
WBC # BLD: 5.9 E9/L (ref 4.5–11.5)
WOUND/ABSCESS: ABNORMAL
WOUND/ABSCESS: ABNORMAL

## 2018-05-09 PROCEDURE — 6360000002 HC RX W HCPCS: Performed by: SURGERY

## 2018-05-09 PROCEDURE — 84100 ASSAY OF PHOSPHORUS: CPT

## 2018-05-09 PROCEDURE — 6370000000 HC RX 637 (ALT 250 FOR IP): Performed by: INTERNAL MEDICINE

## 2018-05-09 PROCEDURE — 80048 BASIC METABOLIC PNL TOTAL CA: CPT

## 2018-05-09 PROCEDURE — 2060000000 HC ICU INTERMEDIATE R&B

## 2018-05-09 PROCEDURE — 36415 COLL VENOUS BLD VENIPUNCTURE: CPT

## 2018-05-09 PROCEDURE — 85730 THROMBOPLASTIN TIME PARTIAL: CPT

## 2018-05-09 PROCEDURE — 85027 COMPLETE CBC AUTOMATED: CPT

## 2018-05-09 PROCEDURE — 82962 GLUCOSE BLOOD TEST: CPT

## 2018-05-09 RX ORDER — OXYCODONE HYDROCHLORIDE AND ACETAMINOPHEN 5; 325 MG/1; MG/1
1 TABLET ORAL EVERY 4 HOURS PRN
Status: DISCONTINUED | OUTPATIENT
Start: 2018-05-09 | End: 2018-05-13 | Stop reason: HOSPADM

## 2018-05-09 RX ORDER — HEPARIN SODIUM 10000 [USP'U]/100ML
18 INJECTION, SOLUTION INTRAVENOUS CONTINUOUS
Status: DISCONTINUED | OUTPATIENT
Start: 2018-05-09 | End: 2018-05-12

## 2018-05-09 RX ADMIN — CALCIUM ACETATE 667 MG: 667 CAPSULE ORAL at 12:57

## 2018-05-09 RX ADMIN — INSULIN LISPRO 3 UNITS: 100 INJECTION, SOLUTION INTRAVENOUS; SUBCUTANEOUS at 20:41

## 2018-05-09 RX ADMIN — AMLODIPINE BESYLATE 5 MG: 5 TABLET ORAL at 08:35

## 2018-05-09 RX ADMIN — CHOLECALCIFEROL TAB 50 MCG (2000 UNIT) 2000 UNITS: 50 TAB at 08:31

## 2018-05-09 RX ADMIN — CARVEDILOL 25 MG: 25 TABLET, FILM COATED ORAL at 17:53

## 2018-05-09 RX ADMIN — INSULIN LISPRO 12 UNITS: 100 INJECTION, SOLUTION INTRAVENOUS; SUBCUTANEOUS at 12:51

## 2018-05-09 RX ADMIN — CALCIUM ACETATE 667 MG: 667 CAPSULE ORAL at 08:32

## 2018-05-09 RX ADMIN — CALCIUM ACETATE 667 MG: 667 CAPSULE ORAL at 17:53

## 2018-05-09 RX ADMIN — Medication 1 CAPSULE: at 10:20

## 2018-05-09 RX ADMIN — INSULIN GLARGINE 24 UNITS: 100 INJECTION, SOLUTION SUBCUTANEOUS at 20:41

## 2018-05-09 RX ADMIN — NEPHROCAP 1 MG: 1 CAP ORAL at 17:53

## 2018-05-09 RX ADMIN — OXYCODONE HYDROCHLORIDE AND ACETAMINOPHEN 1 TABLET: 5; 325 TABLET ORAL at 12:57

## 2018-05-09 RX ADMIN — HEPARIN SODIUM AND DEXTROSE 18 UNITS/KG/HR: 10000; 5 INJECTION INTRAVENOUS at 22:04

## 2018-05-09 RX ADMIN — CARVEDILOL 25 MG: 25 TABLET, FILM COATED ORAL at 08:33

## 2018-05-09 RX ADMIN — FAMOTIDINE 20 MG: 20 TABLET, FILM COATED ORAL at 08:35

## 2018-05-09 RX ADMIN — FUROSEMIDE 80 MG: 40 TABLET ORAL at 08:35

## 2018-05-09 RX ADMIN — OXYCODONE HYDROCHLORIDE AND ACETAMINOPHEN 1 TABLET: 5; 325 TABLET ORAL at 20:41

## 2018-05-09 ASSESSMENT — PAIN DESCRIPTION - ORIENTATION
ORIENTATION: RIGHT
ORIENTATION: RIGHT

## 2018-05-09 ASSESSMENT — PAIN DESCRIPTION - PAIN TYPE
TYPE: ACUTE PAIN;OTHER (COMMENT)
TYPE: ACUTE PAIN

## 2018-05-09 ASSESSMENT — PAIN SCALES - GENERAL
PAINLEVEL_OUTOF10: 3
PAINLEVEL_OUTOF10: 10
PAINLEVEL_OUTOF10: 7

## 2018-05-09 ASSESSMENT — LIFESTYLE VARIABLES: SMOKING_STATUS: 1

## 2018-05-09 ASSESSMENT — PAIN DESCRIPTION - LOCATION
LOCATION: NECK
LOCATION: NECK

## 2018-05-09 ASSESSMENT — PAIN DESCRIPTION - DESCRIPTORS
DESCRIPTORS: SORE
DESCRIPTORS: DISCOMFORT;CONSTANT;SORE

## 2018-05-09 ASSESSMENT — PAIN DESCRIPTION - ONSET: ONSET: ON-GOING

## 2018-05-09 ASSESSMENT — PAIN DESCRIPTION - PROGRESSION: CLINICAL_PROGRESSION: GRADUALLY WORSENING

## 2018-05-09 ASSESSMENT — PAIN DESCRIPTION - FREQUENCY: FREQUENCY: INTERMITTENT

## 2018-05-10 ENCOUNTER — APPOINTMENT (OUTPATIENT)
Dept: GENERAL RADIOLOGY | Age: 33
DRG: 264 | End: 2018-05-10
Payer: MEDICARE

## 2018-05-10 ENCOUNTER — ANESTHESIA (OUTPATIENT)
Dept: OPERATING ROOM | Age: 33
DRG: 264 | End: 2018-05-10
Payer: MEDICARE

## 2018-05-10 VITALS — DIASTOLIC BLOOD PRESSURE: 78 MMHG | SYSTOLIC BLOOD PRESSURE: 150 MMHG | OXYGEN SATURATION: 100 % | TEMPERATURE: 92.1 F

## 2018-05-10 LAB
ABO/RH: NORMAL
ANION GAP SERPL CALCULATED.3IONS-SCNC: 16 MMOL/L (ref 7–16)
ANTIBODY SCREEN: NORMAL
APTT: 49.3 SEC (ref 24.5–35.1)
BUN BLDV-MCNC: 39 MG/DL (ref 6–20)
CALCIUM SERPL-MCNC: 9.1 MG/DL (ref 8.6–10.2)
CHLORIDE BLD-SCNC: 93 MMOL/L (ref 98–107)
CO2: 25 MMOL/L (ref 22–29)
CREAT SERPL-MCNC: 10.4 MG/DL (ref 0.5–1)
GFR AFRICAN AMERICAN: 5
GFR NON-AFRICAN AMERICAN: 5 ML/MIN/1.73
GLUCOSE BLD-MCNC: 276 MG/DL (ref 74–109)
HCG QUALITATIVE: NEGATIVE
HCT VFR BLD CALC: 28.9 % (ref 34–48)
HEMOGLOBIN: 9.8 G/DL (ref 11.5–15.5)
MCH RBC QN AUTO: 30.3 PG (ref 26–35)
MCHC RBC AUTO-ENTMCNC: 33.9 % (ref 32–34.5)
MCV RBC AUTO: 89.5 FL (ref 80–99.9)
METER GLUCOSE: 121 MG/DL (ref 70–110)
METER GLUCOSE: 141 MG/DL (ref 70–110)
METER GLUCOSE: 192 MG/DL (ref 70–110)
METER GLUCOSE: 73 MG/DL (ref 70–110)
METER GLUCOSE: 93 MG/DL (ref 70–110)
PDW BLD-RTO: 15.1 FL (ref 11.5–15)
PLATELET # BLD: 231 E9/L (ref 130–450)
PMV BLD AUTO: 9.6 FL (ref 7–12)
POTASSIUM SERPL-SCNC: 4.9 MMOL/L (ref 3.5–5)
RBC # BLD: 3.23 E12/L (ref 3.5–5.5)
SODIUM BLD-SCNC: 134 MMOL/L (ref 132–146)
WBC # BLD: 4.1 E9/L (ref 4.5–11.5)

## 2018-05-10 PROCEDURE — C1881 DIALYSIS ACCESS SYSTEM: HCPCS | Performed by: SURGERY

## 2018-05-10 PROCEDURE — 2580000003 HC RX 258: Performed by: NURSE ANESTHETIST, CERTIFIED REGISTERED

## 2018-05-10 PROCEDURE — 71045 X-RAY EXAM CHEST 1 VIEW: CPT

## 2018-05-10 PROCEDURE — 3600000013 HC SURGERY LEVEL 3 ADDTL 15MIN: Performed by: SURGERY

## 2018-05-10 PROCEDURE — 3209999900 FLUORO FOR SURGICAL PROCEDURES

## 2018-05-10 PROCEDURE — 2060000000 HC ICU INTERMEDIATE R&B

## 2018-05-10 PROCEDURE — 6370000000 HC RX 637 (ALT 250 FOR IP): Performed by: INTERNAL MEDICINE

## 2018-05-10 PROCEDURE — 85027 COMPLETE CBC AUTOMATED: CPT

## 2018-05-10 PROCEDURE — 2500000003 HC RX 250 WO HCPCS: Performed by: SURGERY

## 2018-05-10 PROCEDURE — 2580000003 HC RX 258: Performed by: INTERNAL MEDICINE

## 2018-05-10 PROCEDURE — 7100000001 HC PACU RECOVERY - ADDTL 15 MIN: Performed by: SURGERY

## 2018-05-10 PROCEDURE — 7100000000 HC PACU RECOVERY - FIRST 15 MIN: Performed by: SURGERY

## 2018-05-10 PROCEDURE — 3700000001 HC ADD 15 MINUTES (ANESTHESIA): Performed by: SURGERY

## 2018-05-10 PROCEDURE — 6360000002 HC RX W HCPCS: Performed by: SURGERY

## 2018-05-10 PROCEDURE — 86901 BLOOD TYPING SEROLOGIC RH(D): CPT

## 2018-05-10 PROCEDURE — 85730 THROMBOPLASTIN TIME PARTIAL: CPT

## 2018-05-10 PROCEDURE — 3600000003 HC SURGERY LEVEL 3 BASE: Performed by: SURGERY

## 2018-05-10 PROCEDURE — 02HV33Z INSERTION OF INFUSION DEVICE INTO SUPERIOR VENA CAVA, PERCUTANEOUS APPROACH: ICD-10-PCS | Performed by: SURGERY

## 2018-05-10 PROCEDURE — 36415 COLL VENOUS BLD VENIPUNCTURE: CPT

## 2018-05-10 PROCEDURE — 6360000002 HC RX W HCPCS: Performed by: NURSE ANESTHETIST, CERTIFIED REGISTERED

## 2018-05-10 PROCEDURE — 82962 GLUCOSE BLOOD TEST: CPT

## 2018-05-10 PROCEDURE — 86900 BLOOD TYPING SEROLOGIC ABO: CPT

## 2018-05-10 PROCEDURE — 80048 BASIC METABOLIC PNL TOTAL CA: CPT

## 2018-05-10 PROCEDURE — 6360000002 HC RX W HCPCS: Performed by: INTERNAL MEDICINE

## 2018-05-10 PROCEDURE — 3700000000 HC ANESTHESIA ATTENDED CARE: Performed by: SURGERY

## 2018-05-10 PROCEDURE — 2500000003 HC RX 250 WO HCPCS: Performed by: NURSE ANESTHETIST, CERTIFIED REGISTERED

## 2018-05-10 PROCEDURE — 84703 CHORIONIC GONADOTROPIN ASSAY: CPT

## 2018-05-10 PROCEDURE — 6360000002 HC RX W HCPCS: Performed by: STUDENT IN AN ORGANIZED HEALTH CARE EDUCATION/TRAINING PROGRAM

## 2018-05-10 PROCEDURE — 86850 RBC ANTIBODY SCREEN: CPT

## 2018-05-10 RX ORDER — HEPARIN SODIUM 1000 [USP'U]/ML
INJECTION, SOLUTION INTRAVENOUS; SUBCUTANEOUS PRN
Status: DISCONTINUED | OUTPATIENT
Start: 2018-05-10 | End: 2018-05-10 | Stop reason: HOSPADM

## 2018-05-10 RX ORDER — CEFAZOLIN SODIUM 1 G/3ML
INJECTION, POWDER, FOR SOLUTION INTRAMUSCULAR; INTRAVENOUS PRN
Status: DISCONTINUED | OUTPATIENT
Start: 2018-05-10 | End: 2018-05-10 | Stop reason: SDUPTHER

## 2018-05-10 RX ORDER — LIDOCAINE HYDROCHLORIDE 20 MG/ML
INJECTION, SOLUTION INFILTRATION; PERINEURAL PRN
Status: DISCONTINUED | OUTPATIENT
Start: 2018-05-10 | End: 2018-05-10 | Stop reason: SDUPTHER

## 2018-05-10 RX ORDER — SODIUM CHLORIDE 9 MG/ML
INJECTION, SOLUTION INTRAVENOUS CONTINUOUS PRN
Status: DISCONTINUED | OUTPATIENT
Start: 2018-05-10 | End: 2018-05-10 | Stop reason: SDUPTHER

## 2018-05-10 RX ORDER — FENTANYL CITRATE 50 UG/ML
INJECTION, SOLUTION INTRAMUSCULAR; INTRAVENOUS PRN
Status: DISCONTINUED | OUTPATIENT
Start: 2018-05-10 | End: 2018-05-10 | Stop reason: SDUPTHER

## 2018-05-10 RX ORDER — PROPOFOL 10 MG/ML
INJECTION, EMULSION INTRAVENOUS CONTINUOUS PRN
Status: DISCONTINUED | OUTPATIENT
Start: 2018-05-10 | End: 2018-05-10 | Stop reason: SDUPTHER

## 2018-05-10 RX ORDER — HEPARIN SODIUM (PORCINE) LOCK FLUSH IV SOLN 100 UNIT/ML 100 UNIT/ML
SOLUTION INTRAVENOUS PRN
Status: DISCONTINUED | OUTPATIENT
Start: 2018-05-10 | End: 2018-05-10 | Stop reason: HOSPADM

## 2018-05-10 RX ORDER — HYDRALAZINE HYDROCHLORIDE 20 MG/ML
10 INJECTION INTRAMUSCULAR; INTRAVENOUS EVERY 4 HOURS PRN
Status: DISCONTINUED | OUTPATIENT
Start: 2018-05-10 | End: 2018-05-13 | Stop reason: HOSPADM

## 2018-05-10 RX ADMIN — FENTANYL CITRATE 50 MCG: 50 INJECTION, SOLUTION INTRAMUSCULAR; INTRAVENOUS at 14:43

## 2018-05-10 RX ADMIN — CALCIUM ACETATE 667 MG: 667 CAPSULE ORAL at 16:44

## 2018-05-10 RX ADMIN — CARVEDILOL 25 MG: 25 TABLET, FILM COATED ORAL at 09:29

## 2018-05-10 RX ADMIN — LIDOCAINE HYDROCHLORIDE 40 MG: 20 INJECTION, SOLUTION INFILTRATION; PERINEURAL at 14:30

## 2018-05-10 RX ADMIN — Medication 10 ML: at 21:26

## 2018-05-10 RX ADMIN — OXYCODONE HYDROCHLORIDE AND ACETAMINOPHEN 1 TABLET: 5; 325 TABLET ORAL at 21:26

## 2018-05-10 RX ADMIN — ONDANSETRON 4 MG: 2 SOLUTION INTRAMUSCULAR; INTRAVENOUS at 10:30

## 2018-05-10 RX ADMIN — AMLODIPINE BESYLATE 5 MG: 5 TABLET ORAL at 09:29

## 2018-05-10 RX ADMIN — FUROSEMIDE 80 MG: 40 TABLET ORAL at 09:29

## 2018-05-10 RX ADMIN — HEPARIN SODIUM AND DEXTROSE 20 UNITS/KG/HR: 10000; 5 INJECTION INTRAVENOUS at 05:05

## 2018-05-10 RX ADMIN — INSULIN GLARGINE 24 UNITS: 100 INJECTION, SOLUTION SUBCUTANEOUS at 21:21

## 2018-05-10 RX ADMIN — FENTANYL CITRATE 50 MCG: 50 INJECTION, SOLUTION INTRAMUSCULAR; INTRAVENOUS at 14:30

## 2018-05-10 RX ADMIN — Medication 1 CAPSULE: at 09:29

## 2018-05-10 RX ADMIN — INSULIN LISPRO 2 UNITS: 100 INJECTION, SOLUTION INTRAVENOUS; SUBCUTANEOUS at 21:21

## 2018-05-10 RX ADMIN — CEFAZOLIN 1000 MG: 1 INJECTION, POWDER, FOR SOLUTION INTRAMUSCULAR; INTRAVENOUS; PARENTERAL at 14:30

## 2018-05-10 RX ADMIN — FAMOTIDINE 20 MG: 20 TABLET, FILM COATED ORAL at 09:29

## 2018-05-10 RX ADMIN — Medication 10 ML: at 09:29

## 2018-05-10 RX ADMIN — CHOLECALCIFEROL TAB 50 MCG (2000 UNIT) 2000 UNITS: 50 TAB at 09:29

## 2018-05-10 RX ADMIN — HEPARIN SODIUM 2520 UNITS: 1000 INJECTION, SOLUTION INTRAVENOUS; SUBCUTANEOUS at 05:04

## 2018-05-10 RX ADMIN — SODIUM CHLORIDE: 9 INJECTION, SOLUTION INTRAVENOUS at 14:15

## 2018-05-10 RX ADMIN — PROPOFOL 100 MCG/KG/MIN: 10 INJECTION, EMULSION INTRAVENOUS at 14:30

## 2018-05-10 RX ADMIN — CARVEDILOL 25 MG: 25 TABLET, FILM COATED ORAL at 16:44

## 2018-05-10 RX ADMIN — OXYCODONE HYDROCHLORIDE AND ACETAMINOPHEN 1 TABLET: 5; 325 TABLET ORAL at 16:44

## 2018-05-10 ASSESSMENT — PULMONARY FUNCTION TESTS
PIF_VALUE: 0
PIF_VALUE: 1
PIF_VALUE: 0
PIF_VALUE: 1
PIF_VALUE: 0
PIF_VALUE: 1
PIF_VALUE: 1
PIF_VALUE: 0
PIF_VALUE: 1
PIF_VALUE: 1
PIF_VALUE: 0
PIF_VALUE: 1
PIF_VALUE: 0
PIF_VALUE: 1
PIF_VALUE: 0
PIF_VALUE: 1
PIF_VALUE: 0
PIF_VALUE: 1
PIF_VALUE: 0
PIF_VALUE: 1
PIF_VALUE: 0
PIF_VALUE: 1
PIF_VALUE: 1
PIF_VALUE: 0
PIF_VALUE: 1
PIF_VALUE: 1

## 2018-05-10 ASSESSMENT — PAIN DESCRIPTION - LOCATION: LOCATION: NECK

## 2018-05-10 ASSESSMENT — PAIN SCALES - GENERAL
PAINLEVEL_OUTOF10: 6
PAINLEVEL_OUTOF10: 0
PAINLEVEL_OUTOF10: 4
PAINLEVEL_OUTOF10: 7
PAINLEVEL_OUTOF10: 0
PAINLEVEL_OUTOF10: 0

## 2018-05-10 ASSESSMENT — PAIN DESCRIPTION - PAIN TYPE: TYPE: ACUTE PAIN

## 2018-05-10 ASSESSMENT — PAIN DESCRIPTION - DESCRIPTORS
DESCRIPTORS: DISCOMFORT
DESCRIPTORS: ACHING;DULL;DISCOMFORT

## 2018-05-10 ASSESSMENT — PAIN DESCRIPTION - ONSET: ONSET: ON-GOING

## 2018-05-10 ASSESSMENT — PAIN DESCRIPTION - FREQUENCY: FREQUENCY: INTERMITTENT

## 2018-05-10 ASSESSMENT — PAIN DESCRIPTION - PROGRESSION: CLINICAL_PROGRESSION: GRADUALLY WORSENING

## 2018-05-10 ASSESSMENT — LIFESTYLE VARIABLES: SMOKING_STATUS: 1

## 2018-05-10 ASSESSMENT — PAIN - FUNCTIONAL ASSESSMENT: PAIN_FUNCTIONAL_ASSESSMENT: 0-10

## 2018-05-10 ASSESSMENT — PAIN DESCRIPTION - ORIENTATION: ORIENTATION: RIGHT

## 2018-05-11 LAB
ANION GAP SERPL CALCULATED.3IONS-SCNC: 15 MMOL/L (ref 7–16)
APTT: 32.3 SEC (ref 24.5–35.1)
BUN BLDV-MCNC: 47 MG/DL (ref 6–20)
CALCIUM SERPL-MCNC: 9 MG/DL (ref 8.6–10.2)
CHLORIDE BLD-SCNC: 94 MMOL/L (ref 98–107)
CO2: 26 MMOL/L (ref 22–29)
CREAT SERPL-MCNC: 12.5 MG/DL (ref 0.5–1)
GFR AFRICAN AMERICAN: 4
GFR NON-AFRICAN AMERICAN: 4 ML/MIN/1.73
GLUCOSE BLD-MCNC: 96 MG/DL (ref 74–109)
HCT VFR BLD CALC: 26.9 % (ref 34–48)
HCT VFR BLD CALC: 29.3 % (ref 34–48)
HEMOGLOBIN: 9.4 G/DL (ref 11.5–15.5)
HEMOGLOBIN: 9.8 G/DL (ref 11.5–15.5)
MCH RBC QN AUTO: 30.2 PG (ref 26–35)
MCHC RBC AUTO-ENTMCNC: 33.4 % (ref 32–34.5)
MCV RBC AUTO: 90.4 FL (ref 80–99.9)
METER GLUCOSE: 128 MG/DL (ref 70–110)
METER GLUCOSE: 223 MG/DL (ref 70–110)
METER GLUCOSE: 312 MG/DL (ref 70–110)
METER GLUCOSE: 68 MG/DL (ref 70–110)
METER GLUCOSE: 84 MG/DL (ref 70–110)
PDW BLD-RTO: 15.5 FL (ref 11.5–15)
PLATELET # BLD: 256 E9/L (ref 130–450)
PMV BLD AUTO: 10.4 FL (ref 7–12)
POTASSIUM SERPL-SCNC: 5.3 MMOL/L (ref 3.5–5)
RBC # BLD: 3.24 E12/L (ref 3.5–5.5)
SODIUM BLD-SCNC: 135 MMOL/L (ref 132–146)
WBC # BLD: 5 E9/L (ref 4.5–11.5)

## 2018-05-11 PROCEDURE — 85730 THROMBOPLASTIN TIME PARTIAL: CPT

## 2018-05-11 PROCEDURE — 85014 HEMATOCRIT: CPT

## 2018-05-11 PROCEDURE — 6370000000 HC RX 637 (ALT 250 FOR IP): Performed by: INTERNAL MEDICINE

## 2018-05-11 PROCEDURE — 90935 HEMODIALYSIS ONE EVALUATION: CPT | Performed by: INTERNAL MEDICINE

## 2018-05-11 PROCEDURE — 85018 HEMOGLOBIN: CPT

## 2018-05-11 PROCEDURE — 36415 COLL VENOUS BLD VENIPUNCTURE: CPT

## 2018-05-11 PROCEDURE — 80048 BASIC METABOLIC PNL TOTAL CA: CPT

## 2018-05-11 PROCEDURE — 2500000003 HC RX 250 WO HCPCS

## 2018-05-11 PROCEDURE — 2580000003 HC RX 258: Performed by: INTERNAL MEDICINE

## 2018-05-11 PROCEDURE — 99231 SBSQ HOSP IP/OBS SF/LOW 25: CPT | Performed by: SURGERY

## 2018-05-11 PROCEDURE — 2500000003 HC RX 250 WO HCPCS: Performed by: SURGERY

## 2018-05-11 PROCEDURE — 6360000002 HC RX W HCPCS: Performed by: REGISTERED NURSE

## 2018-05-11 PROCEDURE — 2060000000 HC ICU INTERMEDIATE R&B

## 2018-05-11 PROCEDURE — 0W383ZZ CONTROL BLEEDING IN CHEST WALL, PERCUTANEOUS APPROACH: ICD-10-PCS | Performed by: SURGERY

## 2018-05-11 PROCEDURE — 6360000002 HC RX W HCPCS: Performed by: INTERNAL MEDICINE

## 2018-05-11 PROCEDURE — 2580000003 HC RX 258: Performed by: REGISTERED NURSE

## 2018-05-11 PROCEDURE — 82962 GLUCOSE BLOOD TEST: CPT

## 2018-05-11 PROCEDURE — 85027 COMPLETE CBC AUTOMATED: CPT

## 2018-05-11 RX ORDER — LIDOCAINE HYDROCHLORIDE 10 MG/ML
INJECTION, SOLUTION INFILTRATION; PERINEURAL
Status: COMPLETED
Start: 2018-05-11 | End: 2018-05-11

## 2018-05-11 RX ORDER — LIDOCAINE HYDROCHLORIDE 10 MG/ML
2 INJECTION, SOLUTION EPIDURAL; INFILTRATION; INTRACAUDAL; PERINEURAL ONCE
Status: DISCONTINUED | OUTPATIENT
Start: 2018-05-11 | End: 2018-05-13 | Stop reason: HOSPADM

## 2018-05-11 RX ADMIN — FUROSEMIDE 80 MG: 40 TABLET ORAL at 08:17

## 2018-05-11 RX ADMIN — CALCIUM ACETATE 667 MG: 667 CAPSULE ORAL at 19:02

## 2018-05-11 RX ADMIN — OXYCODONE HYDROCHLORIDE AND ACETAMINOPHEN 1 TABLET: 5; 325 TABLET ORAL at 21:53

## 2018-05-11 RX ADMIN — INSULIN LISPRO 12 UNITS: 100 INJECTION, SOLUTION INTRAVENOUS; SUBCUTANEOUS at 17:20

## 2018-05-11 RX ADMIN — Medication 1 CAPSULE: at 08:17

## 2018-05-11 RX ADMIN — CHOLECALCIFEROL TAB 50 MCG (2000 UNIT) 2000 UNITS: 50 TAB at 08:17

## 2018-05-11 RX ADMIN — CEFEPIME HYDROCHLORIDE 2 G: 2 INJECTION, POWDER, FOR SOLUTION INTRAVENOUS at 14:49

## 2018-05-11 RX ADMIN — DEXTROSE MONOHYDRATE 12.5 G: 25 INJECTION, SOLUTION INTRAVENOUS at 06:53

## 2018-05-11 RX ADMIN — CARVEDILOL 25 MG: 25 TABLET, FILM COATED ORAL at 17:23

## 2018-05-11 RX ADMIN — FAMOTIDINE 20 MG: 20 TABLET, FILM COATED ORAL at 08:17

## 2018-05-11 RX ADMIN — OXYCODONE HYDROCHLORIDE AND ACETAMINOPHEN 1 TABLET: 5; 325 TABLET ORAL at 04:39

## 2018-05-11 RX ADMIN — Medication 10 ML: at 14:49

## 2018-05-11 RX ADMIN — INSULIN GLARGINE 24 UNITS: 100 INJECTION, SOLUTION SUBCUTANEOUS at 20:50

## 2018-05-11 RX ADMIN — CALCIUM ACETATE 667 MG: 667 CAPSULE ORAL at 15:16

## 2018-05-11 RX ADMIN — ONDANSETRON 4 MG: 2 SOLUTION INTRAMUSCULAR; INTRAVENOUS at 05:20

## 2018-05-11 RX ADMIN — AMLODIPINE BESYLATE 5 MG: 5 TABLET ORAL at 08:18

## 2018-05-11 RX ADMIN — Medication 10 ML: at 08:21

## 2018-05-11 RX ADMIN — Medication 10 ML: at 20:51

## 2018-05-11 RX ADMIN — INSULIN LISPRO 3 UNITS: 100 INJECTION, SOLUTION INTRAVENOUS; SUBCUTANEOUS at 20:51

## 2018-05-11 RX ADMIN — CARVEDILOL 25 MG: 25 TABLET, FILM COATED ORAL at 08:18

## 2018-05-11 RX ADMIN — LIDOCAINE HYDROCHLORIDE: 10 INJECTION, SOLUTION INFILTRATION; PERINEURAL at 17:03

## 2018-05-11 ASSESSMENT — PAIN SCALES - GENERAL
PAINLEVEL_OUTOF10: 7
PAINLEVEL_OUTOF10: 0
PAINLEVEL_OUTOF10: 0
PAINLEVEL_OUTOF10: 7
PAINLEVEL_OUTOF10: 0
PAINLEVEL_OUTOF10: 8
PAINLEVEL_OUTOF10: 7
PAINLEVEL_OUTOF10: 0
PAINLEVEL_OUTOF10: 7

## 2018-05-11 ASSESSMENT — PAIN DESCRIPTION - LOCATION
LOCATION: ARM;NECK
LOCATION: NECK
LOCATION: ARM;NECK;SHOULDER

## 2018-05-11 ASSESSMENT — PAIN DESCRIPTION - ORIENTATION
ORIENTATION: RIGHT;LEFT
ORIENTATION: LEFT

## 2018-05-11 ASSESSMENT — PAIN DESCRIPTION - PAIN TYPE
TYPE: SURGICAL PAIN
TYPE: ACUTE PAIN
TYPE: ACUTE PAIN

## 2018-05-11 ASSESSMENT — PAIN DESCRIPTION - FREQUENCY
FREQUENCY: INTERMITTENT
FREQUENCY: INTERMITTENT

## 2018-05-11 ASSESSMENT — PAIN DESCRIPTION - ONSET
ONSET: ON-GOING
ONSET: ON-GOING

## 2018-05-11 ASSESSMENT — PAIN DESCRIPTION - DESCRIPTORS
DESCRIPTORS: ACHING;DISCOMFORT;STABBING
DESCRIPTORS: ACHING;STABBING;DISCOMFORT

## 2018-05-12 LAB
ANION GAP SERPL CALCULATED.3IONS-SCNC: 13 MMOL/L (ref 7–16)
APTT: 31.7 SEC (ref 24.5–35.1)
APTT: 36.6 SEC (ref 24.5–35.1)
BLOOD CULTURE, ROUTINE: NORMAL
BUN BLDV-MCNC: 27 MG/DL (ref 6–20)
CALCIUM SERPL-MCNC: 9.5 MG/DL (ref 8.6–10.2)
CHLORIDE BLD-SCNC: 92 MMOL/L (ref 98–107)
CO2: 30 MMOL/L (ref 22–29)
CREAT SERPL-MCNC: 7.3 MG/DL (ref 0.5–1)
CULTURE, BLOOD 2: NORMAL
GFR AFRICAN AMERICAN: 8
GFR NON-AFRICAN AMERICAN: 8 ML/MIN/1.73
GLUCOSE BLD-MCNC: 72 MG/DL (ref 74–109)
HCT VFR BLD CALC: 32.3 % (ref 34–48)
HEMOGLOBIN: 10.8 G/DL (ref 11.5–15.5)
MAGNESIUM: 2.5 MG/DL (ref 1.6–2.6)
MCH RBC QN AUTO: 30.3 PG (ref 26–35)
MCHC RBC AUTO-ENTMCNC: 33.4 % (ref 32–34.5)
MCV RBC AUTO: 90.5 FL (ref 80–99.9)
METER GLUCOSE: 118 MG/DL (ref 70–110)
METER GLUCOSE: 144 MG/DL (ref 70–110)
METER GLUCOSE: 246 MG/DL (ref 70–110)
METER GLUCOSE: 353 MG/DL (ref 70–110)
METER GLUCOSE: 52 MG/DL (ref 70–110)
METER GLUCOSE: 81 MG/DL (ref 70–110)
METER GLUCOSE: 95 MG/DL (ref 70–110)
METER GLUCOSE: 98 MG/DL (ref 70–110)
METER GLUCOSE: <40 MG/DL (ref 70–110)
PDW BLD-RTO: 15.3 FL (ref 11.5–15)
PHOSPHORUS: 5.3 MG/DL (ref 2.5–4.5)
PLATELET # BLD: 255 E9/L (ref 130–450)
PMV BLD AUTO: 10.2 FL (ref 7–12)
POTASSIUM SERPL-SCNC: 4.9 MMOL/L (ref 3.5–5)
RBC # BLD: 3.57 E12/L (ref 3.5–5.5)
SODIUM BLD-SCNC: 135 MMOL/L (ref 132–146)
WBC # BLD: 6.7 E9/L (ref 4.5–11.5)

## 2018-05-12 PROCEDURE — 84100 ASSAY OF PHOSPHORUS: CPT

## 2018-05-12 PROCEDURE — 6360000002 HC RX W HCPCS: Performed by: SURGERY

## 2018-05-12 PROCEDURE — 6370000000 HC RX 637 (ALT 250 FOR IP): Performed by: INTERNAL MEDICINE

## 2018-05-12 PROCEDURE — 2060000000 HC ICU INTERMEDIATE R&B

## 2018-05-12 PROCEDURE — 2580000003 HC RX 258: Performed by: INTERNAL MEDICINE

## 2018-05-12 PROCEDURE — 85730 THROMBOPLASTIN TIME PARTIAL: CPT

## 2018-05-12 PROCEDURE — 99231 SBSQ HOSP IP/OBS SF/LOW 25: CPT | Performed by: SURGERY

## 2018-05-12 PROCEDURE — 85027 COMPLETE CBC AUTOMATED: CPT

## 2018-05-12 PROCEDURE — 80048 BASIC METABOLIC PNL TOTAL CA: CPT

## 2018-05-12 PROCEDURE — 36415 COLL VENOUS BLD VENIPUNCTURE: CPT

## 2018-05-12 PROCEDURE — 82962 GLUCOSE BLOOD TEST: CPT

## 2018-05-12 PROCEDURE — 83735 ASSAY OF MAGNESIUM: CPT

## 2018-05-12 RX ORDER — HEPARIN SODIUM 10000 [USP'U]/100ML
18 INJECTION, SOLUTION INTRAVENOUS CONTINUOUS
Status: DISCONTINUED | OUTPATIENT
Start: 2018-05-12 | End: 2018-05-13

## 2018-05-12 RX ORDER — HEPARIN SODIUM 1000 [USP'U]/ML
80 INJECTION, SOLUTION INTRAVENOUS; SUBCUTANEOUS PRN
Status: DISCONTINUED | OUTPATIENT
Start: 2018-05-12 | End: 2018-05-13 | Stop reason: HOSPADM

## 2018-05-12 RX ORDER — HEPARIN SODIUM 1000 [USP'U]/ML
40 INJECTION, SOLUTION INTRAVENOUS; SUBCUTANEOUS PRN
Status: DISCONTINUED | OUTPATIENT
Start: 2018-05-12 | End: 2018-05-13 | Stop reason: HOSPADM

## 2018-05-12 RX ADMIN — DEXTROSE MONOHYDRATE 12.5 G: 25 INJECTION, SOLUTION INTRAVENOUS at 04:02

## 2018-05-12 RX ADMIN — INSULIN GLARGINE 24 UNITS: 100 INJECTION, SOLUTION SUBCUTANEOUS at 21:06

## 2018-05-12 RX ADMIN — INSULIN LISPRO 15 UNITS: 100 INJECTION, SOLUTION INTRAVENOUS; SUBCUTANEOUS at 12:49

## 2018-05-12 RX ADMIN — FUROSEMIDE 80 MG: 40 TABLET ORAL at 08:17

## 2018-05-12 RX ADMIN — CALCIUM ACETATE 667 MG: 667 CAPSULE ORAL at 17:20

## 2018-05-12 RX ADMIN — AMLODIPINE BESYLATE 5 MG: 5 TABLET ORAL at 08:17

## 2018-05-12 RX ADMIN — INSULIN LISPRO 3 UNITS: 100 INJECTION, SOLUTION INTRAVENOUS; SUBCUTANEOUS at 07:42

## 2018-05-12 RX ADMIN — CARVEDILOL 25 MG: 25 TABLET, FILM COATED ORAL at 08:17

## 2018-05-12 RX ADMIN — HEPARIN SODIUM 5060 UNITS: 1000 INJECTION, SOLUTION INTRAVENOUS; SUBCUTANEOUS at 18:06

## 2018-05-12 RX ADMIN — INSULIN LISPRO 3 UNITS: 100 INJECTION, SOLUTION INTRAVENOUS; SUBCUTANEOUS at 21:04

## 2018-05-12 RX ADMIN — HEPARIN SODIUM AND DEXTROSE 18 UNITS/KG/HR: 10000; 5 INJECTION INTRAVENOUS at 11:33

## 2018-05-12 RX ADMIN — Medication 1 CAPSULE: at 08:17

## 2018-05-12 RX ADMIN — CHOLECALCIFEROL TAB 50 MCG (2000 UNIT) 2000 UNITS: 50 TAB at 08:17

## 2018-05-12 RX ADMIN — FAMOTIDINE 20 MG: 20 TABLET, FILM COATED ORAL at 08:17

## 2018-05-12 RX ADMIN — CALCIUM ACETATE 667 MG: 667 CAPSULE ORAL at 08:17

## 2018-05-12 RX ADMIN — DEXTROSE MONOHYDRATE 12.5 G: 25 INJECTION, SOLUTION INTRAVENOUS at 04:40

## 2018-05-12 RX ADMIN — Medication 10 ML: at 08:18

## 2018-05-12 RX ADMIN — CARVEDILOL 25 MG: 25 TABLET, FILM COATED ORAL at 17:20

## 2018-05-12 RX ADMIN — HEPARIN SODIUM AND DEXTROSE 22 UNITS/KG/HR: 10000; 5 INJECTION INTRAVENOUS at 18:08

## 2018-05-12 RX ADMIN — CALCIUM ACETATE 667 MG: 667 CAPSULE ORAL at 12:49

## 2018-05-12 ASSESSMENT — PAIN SCALES - GENERAL
PAINLEVEL_OUTOF10: 0
PAINLEVEL_OUTOF10: 0

## 2018-05-13 VITALS
HEART RATE: 91 BPM | BODY MASS INDEX: 28.43 KG/M2 | RESPIRATION RATE: 16 BRPM | WEIGHT: 141 LBS | OXYGEN SATURATION: 97 % | HEIGHT: 59 IN | TEMPERATURE: 98.4 F | SYSTOLIC BLOOD PRESSURE: 142 MMHG | DIASTOLIC BLOOD PRESSURE: 80 MMHG

## 2018-05-13 LAB
APTT: 59.1 SEC (ref 24.5–35.1)
METER GLUCOSE: 130 MG/DL (ref 70–110)
METER GLUCOSE: 203 MG/DL (ref 70–110)
METER GLUCOSE: 293 MG/DL (ref 70–110)
METER GLUCOSE: 59 MG/DL (ref 70–110)
METER GLUCOSE: 72 MG/DL (ref 70–110)
PLATELET # BLD: 235 E9/L (ref 130–450)

## 2018-05-13 PROCEDURE — 6370000000 HC RX 637 (ALT 250 FOR IP): Performed by: INTERNAL MEDICINE

## 2018-05-13 PROCEDURE — 85730 THROMBOPLASTIN TIME PARTIAL: CPT

## 2018-05-13 PROCEDURE — 99232 SBSQ HOSP IP/OBS MODERATE 35: CPT | Performed by: SURGERY

## 2018-05-13 PROCEDURE — 85049 AUTOMATED PLATELET COUNT: CPT

## 2018-05-13 PROCEDURE — 36415 COLL VENOUS BLD VENIPUNCTURE: CPT

## 2018-05-13 PROCEDURE — 82962 GLUCOSE BLOOD TEST: CPT

## 2018-05-13 RX ORDER — WARFARIN SODIUM 5 MG/1
5 TABLET ORAL
Status: DISCONTINUED | OUTPATIENT
Start: 2018-05-13 | End: 2018-05-13

## 2018-05-13 RX ADMIN — FAMOTIDINE 20 MG: 20 TABLET, FILM COATED ORAL at 08:55

## 2018-05-13 RX ADMIN — FUROSEMIDE 80 MG: 40 TABLET ORAL at 08:55

## 2018-05-13 RX ADMIN — Medication 1 CAPSULE: at 08:55

## 2018-05-13 RX ADMIN — APIXABAN 5 MG: 5 TABLET, FILM COATED ORAL at 16:28

## 2018-05-13 RX ADMIN — DEXTROSE 15 G: 15 GEL ORAL at 02:23

## 2018-05-13 RX ADMIN — CALCIUM ACETATE 667 MG: 667 CAPSULE ORAL at 13:03

## 2018-05-13 RX ADMIN — CHOLECALCIFEROL TAB 50 MCG (2000 UNIT) 2000 UNITS: 50 TAB at 08:55

## 2018-05-13 RX ADMIN — INSULIN LISPRO 9 UNITS: 100 INJECTION, SOLUTION INTRAVENOUS; SUBCUTANEOUS at 13:01

## 2018-05-13 RX ADMIN — CALCIUM ACETATE 667 MG: 667 CAPSULE ORAL at 08:55

## 2018-05-13 RX ADMIN — CARVEDILOL 25 MG: 25 TABLET, FILM COATED ORAL at 08:55

## 2018-05-13 RX ADMIN — AMLODIPINE BESYLATE 5 MG: 5 TABLET ORAL at 08:55

## 2018-05-13 ASSESSMENT — PAIN SCALES - GENERAL
PAINLEVEL_OUTOF10: 0
PAINLEVEL_OUTOF10: 0

## 2018-06-04 ENCOUNTER — OFFICE VISIT (OUTPATIENT)
Dept: VASCULAR SURGERY | Age: 33
End: 2018-06-04
Payer: MEDICARE

## 2018-06-04 DIAGNOSIS — Z99.2 ESRD (END STAGE RENAL DISEASE) ON DIALYSIS (HCC): Primary | ICD-10-CM

## 2018-06-04 DIAGNOSIS — N18.6 ESRD (END STAGE RENAL DISEASE) ON DIALYSIS (HCC): Primary | ICD-10-CM

## 2018-06-04 PROCEDURE — 99212 OFFICE O/P EST SF 10 MIN: CPT | Performed by: NURSE PRACTITIONER

## 2018-06-20 ENCOUNTER — TELEPHONE (OUTPATIENT)
Dept: VASCULAR SURGERY | Age: 33
End: 2018-06-20

## 2018-06-25 ENCOUNTER — TELEPHONE (OUTPATIENT)
Dept: VASCULAR SURGERY | Age: 33
End: 2018-06-25

## 2018-07-12 PROBLEM — N18.6 ENCOUNTER REGARDING VASCULAR ACCESS FOR DIALYSIS FOR ESRD (HCC): Chronic | Status: ACTIVE | Noted: 2018-07-12

## 2018-07-12 PROBLEM — Z99.2 ENCOUNTER REGARDING VASCULAR ACCESS FOR DIALYSIS FOR ESRD (HCC): Chronic | Status: ACTIVE | Noted: 2018-07-12

## 2018-07-16 ENCOUNTER — ANESTHESIA EVENT (OUTPATIENT)
Dept: OPERATING ROOM | Age: 33
End: 2018-07-16
Payer: MEDICARE

## 2018-07-17 ENCOUNTER — HOSPITAL ENCOUNTER (OUTPATIENT)
Age: 33
Setting detail: OBSERVATION
Discharge: HOME | End: 2018-07-18
Attending: SURGERY | Admitting: SURGERY
Payer: MEDICARE

## 2018-07-17 ENCOUNTER — ANESTHESIA (OUTPATIENT)
Dept: OPERATING ROOM | Age: 33
End: 2018-07-17
Payer: MEDICARE

## 2018-07-17 VITALS
OXYGEN SATURATION: 95 % | SYSTOLIC BLOOD PRESSURE: 93 MMHG | DIASTOLIC BLOOD PRESSURE: 69 MMHG | RESPIRATION RATE: 22 BRPM

## 2018-07-17 DIAGNOSIS — Z99.2 ENCOUNTER REGARDING VASCULAR ACCESS FOR DIALYSIS FOR ESRD (HCC): Primary | Chronic | ICD-10-CM

## 2018-07-17 DIAGNOSIS — N18.6 ENCOUNTER REGARDING VASCULAR ACCESS FOR DIALYSIS FOR ESRD (HCC): Primary | Chronic | ICD-10-CM

## 2018-07-17 PROBLEM — J96.90 RESPIRATORY FAILURE (HCC): Status: ACTIVE | Noted: 2018-07-17

## 2018-07-17 LAB
ABO/RH: NORMAL
ANION GAP SERPL CALCULATED.3IONS-SCNC: 9 MMOL/L (ref 7–16)
ANTIBODY SCREEN: NORMAL
BUN BLDV-MCNC: 20 MG/DL (ref 6–20)
CALCIUM SERPL-MCNC: 9.7 MG/DL (ref 8.6–10.2)
CHLORIDE BLD-SCNC: 94 MMOL/L (ref 98–107)
CO2: 35 MMOL/L (ref 22–29)
CREAT SERPL-MCNC: 6.6 MG/DL (ref 0.5–1)
GFR AFRICAN AMERICAN: 9
GFR NON-AFRICAN AMERICAN: 9 ML/MIN/1.73
GLUCOSE BLD-MCNC: 197 MG/DL (ref 74–109)
GLUCOSE BLD-MCNC: 204 MG/DL
HCT VFR BLD CALC: 30.1 % (ref 34–48)
HEMOGLOBIN: 10.6 G/DL (ref 11.5–15.5)
INR BLD: 1
MCH RBC QN AUTO: 32.9 PG (ref 26–35)
MCHC RBC AUTO-ENTMCNC: 35.2 % (ref 32–34.5)
MCV RBC AUTO: 93.5 FL (ref 80–99.9)
METER GLUCOSE: 204 MG/DL (ref 70–110)
METER GLUCOSE: 226 MG/DL (ref 70–110)
METER GLUCOSE: 434 MG/DL (ref 70–110)
PDW BLD-RTO: 17.2 FL (ref 11.5–15)
PLATELET # BLD: 180 E9/L (ref 130–450)
PMV BLD AUTO: 9.9 FL (ref 7–12)
POTASSIUM REFLEX MAGNESIUM: 4.7 MMOL/L (ref 3.5–5)
PREGNANCY TEST URINE, POC: NEGATIVE
PROTHROMBIN TIME: 11.8 SEC (ref 9.3–12.4)
RBC # BLD: 3.22 E12/L (ref 3.5–5.5)
SODIUM BLD-SCNC: 138 MMOL/L (ref 132–146)
WBC # BLD: 5.6 E9/L (ref 4.5–11.5)

## 2018-07-17 PROCEDURE — 86900 BLOOD TYPING SEROLOGIC ABO: CPT

## 2018-07-17 PROCEDURE — 94664 DEMO&/EVAL PT USE INHALER: CPT

## 2018-07-17 PROCEDURE — 6360000002 HC RX W HCPCS

## 2018-07-17 PROCEDURE — 2500000003 HC RX 250 WO HCPCS: Performed by: NURSE ANESTHETIST, CERTIFIED REGISTERED

## 2018-07-17 PROCEDURE — G0378 HOSPITAL OBSERVATION PER HR: HCPCS

## 2018-07-17 PROCEDURE — 36415 COLL VENOUS BLD VENIPUNCTURE: CPT

## 2018-07-17 PROCEDURE — 76942 ECHO GUIDE FOR BIOPSY: CPT | Performed by: ANESTHESIOLOGY

## 2018-07-17 PROCEDURE — 85610 PROTHROMBIN TIME: CPT

## 2018-07-17 PROCEDURE — 2580000003 HC RX 258: Performed by: STUDENT IN AN ORGANIZED HEALTH CARE EDUCATION/TRAINING PROGRAM

## 2018-07-17 PROCEDURE — 2500000003 HC RX 250 WO HCPCS: Performed by: SURGERY

## 2018-07-17 PROCEDURE — 6360000002 HC RX W HCPCS: Performed by: ANESTHESIOLOGY

## 2018-07-17 PROCEDURE — 6370000000 HC RX 637 (ALT 250 FOR IP): Performed by: NURSE ANESTHETIST, CERTIFIED REGISTERED

## 2018-07-17 PROCEDURE — 6360000002 HC RX W HCPCS: Performed by: NURSE ANESTHETIST, CERTIFIED REGISTERED

## 2018-07-17 PROCEDURE — 80048 BASIC METABOLIC PNL TOTAL CA: CPT

## 2018-07-17 PROCEDURE — 6370000000 HC RX 637 (ALT 250 FOR IP): Performed by: ANESTHESIOLOGY

## 2018-07-17 PROCEDURE — 36819 AV FUSE UPPR ARM BASILIC: CPT | Performed by: SURGERY

## 2018-07-17 PROCEDURE — 82962 GLUCOSE BLOOD TEST: CPT

## 2018-07-17 PROCEDURE — 3700000001 HC ADD 15 MINUTES (ANESTHESIA): Performed by: SURGERY

## 2018-07-17 PROCEDURE — 85027 COMPLETE CBC AUTOMATED: CPT

## 2018-07-17 PROCEDURE — 3700000000 HC ANESTHESIA ATTENDED CARE: Performed by: SURGERY

## 2018-07-17 PROCEDURE — 86850 RBC ANTIBODY SCREEN: CPT

## 2018-07-17 PROCEDURE — 3600000012 HC SURGERY LEVEL 2 ADDTL 15MIN: Performed by: SURGERY

## 2018-07-17 PROCEDURE — 7100000000 HC PACU RECOVERY - FIRST 15 MIN: Performed by: SURGERY

## 2018-07-17 PROCEDURE — 7100000001 HC PACU RECOVERY - ADDTL 15 MIN: Performed by: SURGERY

## 2018-07-17 PROCEDURE — 3600000002 HC SURGERY LEVEL 2 BASE: Performed by: SURGERY

## 2018-07-17 PROCEDURE — 2000000000 HC ICU R&B

## 2018-07-17 PROCEDURE — 86901 BLOOD TYPING SEROLOGIC RH(D): CPT

## 2018-07-17 PROCEDURE — 94640 AIRWAY INHALATION TREATMENT: CPT

## 2018-07-17 PROCEDURE — 2580000003 HC RX 258: Performed by: SURGERY

## 2018-07-17 PROCEDURE — 6360000002 HC RX W HCPCS: Performed by: STUDENT IN AN ORGANIZED HEALTH CARE EDUCATION/TRAINING PROGRAM

## 2018-07-17 PROCEDURE — 6370000000 HC RX 637 (ALT 250 FOR IP): Performed by: STUDENT IN AN ORGANIZED HEALTH CARE EDUCATION/TRAINING PROGRAM

## 2018-07-17 RX ORDER — PROPOFOL 10 MG/ML
INJECTION, EMULSION INTRAVENOUS CONTINUOUS PRN
Status: DISCONTINUED | OUTPATIENT
Start: 2018-07-17 | End: 2018-07-17 | Stop reason: SDUPTHER

## 2018-07-17 RX ORDER — MORPHINE SULFATE 2 MG/ML
1 INJECTION, SOLUTION INTRAMUSCULAR; INTRAVENOUS EVERY 5 MIN PRN
Status: DISCONTINUED | OUTPATIENT
Start: 2018-07-17 | End: 2018-07-17

## 2018-07-17 RX ORDER — AMLODIPINE BESYLATE 5 MG/1
5 TABLET ORAL EVERY MORNING
Status: DISCONTINUED | OUTPATIENT
Start: 2018-07-18 | End: 2018-07-18 | Stop reason: HOSPADM

## 2018-07-17 RX ORDER — SODIUM CHLORIDE 0.9 % (FLUSH) 0.9 %
10 SYRINGE (ML) INJECTION PRN
Status: DISCONTINUED | OUTPATIENT
Start: 2018-07-17 | End: 2018-07-17 | Stop reason: HOSPADM

## 2018-07-17 RX ORDER — FENTANYL CITRATE 50 UG/ML
INJECTION, SOLUTION INTRAMUSCULAR; INTRAVENOUS PRN
Status: DISCONTINUED | OUTPATIENT
Start: 2018-07-17 | End: 2018-07-17 | Stop reason: SDUPTHER

## 2018-07-17 RX ORDER — SODIUM CHLORIDE 0.9 % (FLUSH) 0.9 %
10 SYRINGE (ML) INJECTION EVERY 12 HOURS SCHEDULED
Status: DISCONTINUED | OUTPATIENT
Start: 2018-07-17 | End: 2018-07-18 | Stop reason: HOSPADM

## 2018-07-17 RX ORDER — DEXAMETHASONE SODIUM PHOSPHATE 10 MG/ML
INJECTION, SOLUTION INTRAMUSCULAR; INTRAVENOUS PRN
Status: DISCONTINUED | OUTPATIENT
Start: 2018-07-17 | End: 2018-07-17 | Stop reason: SDUPTHER

## 2018-07-17 RX ORDER — ONDANSETRON 2 MG/ML
4 INJECTION INTRAMUSCULAR; INTRAVENOUS ONCE
Status: DISCONTINUED | OUTPATIENT
Start: 2018-07-17 | End: 2018-07-18 | Stop reason: HOSPADM

## 2018-07-17 RX ORDER — LIDOCAINE HYDROCHLORIDE 20 MG/ML
INJECTION, SOLUTION INFILTRATION; PERINEURAL PRN
Status: DISCONTINUED | OUTPATIENT
Start: 2018-07-17 | End: 2018-07-17 | Stop reason: SDUPTHER

## 2018-07-17 RX ORDER — HYDRALAZINE HYDROCHLORIDE 20 MG/ML
5 INJECTION INTRAMUSCULAR; INTRAVENOUS EVERY 5 MIN PRN
Status: COMPLETED | OUTPATIENT
Start: 2018-07-17 | End: 2018-07-17

## 2018-07-17 RX ORDER — SODIUM CHLORIDE 0.9 % (FLUSH) 0.9 %
10 SYRINGE (ML) INJECTION EVERY 12 HOURS SCHEDULED
Status: DISCONTINUED | OUTPATIENT
Start: 2018-07-17 | End: 2018-07-17 | Stop reason: HOSPADM

## 2018-07-17 RX ORDER — ALBUTEROL SULFATE 1.25 MG/3ML
1.25 SOLUTION RESPIRATORY (INHALATION) EVERY 6 HOURS PRN
Status: DISCONTINUED | OUTPATIENT
Start: 2018-07-17 | End: 2018-07-18 | Stop reason: HOSPADM

## 2018-07-17 RX ORDER — SODIUM CHLORIDE 9 MG/ML
INJECTION, SOLUTION INTRAVENOUS CONTINUOUS
Status: DISCONTINUED | OUTPATIENT
Start: 2018-07-17 | End: 2018-07-17

## 2018-07-17 RX ORDER — HEPARIN SODIUM 1000 [USP'U]/ML
INJECTION, SOLUTION INTRAVENOUS; SUBCUTANEOUS PRN
Status: DISCONTINUED | OUTPATIENT
Start: 2018-07-17 | End: 2018-07-17 | Stop reason: SDUPTHER

## 2018-07-17 RX ORDER — SODIUM CHLORIDE 0.9 % (FLUSH) 0.9 %
10 SYRINGE (ML) INJECTION PRN
Status: DISCONTINUED | OUTPATIENT
Start: 2018-07-17 | End: 2018-07-18 | Stop reason: HOSPADM

## 2018-07-17 RX ORDER — MIDAZOLAM HYDROCHLORIDE 1 MG/ML
1 INJECTION INTRAMUSCULAR; INTRAVENOUS EVERY 5 MIN PRN
Status: DISCONTINUED | OUTPATIENT
Start: 2018-07-17 | End: 2018-07-17 | Stop reason: HOSPADM

## 2018-07-17 RX ORDER — ONDANSETRON 2 MG/ML
4 INJECTION INTRAMUSCULAR; INTRAVENOUS EVERY 6 HOURS PRN
Status: DISCONTINUED | OUTPATIENT
Start: 2018-07-17 | End: 2018-07-18 | Stop reason: HOSPADM

## 2018-07-17 RX ORDER — SUCCINYLCHOLINE CHLORIDE 20 MG/ML
INJECTION INTRAMUSCULAR; INTRAVENOUS
Status: DISCONTINUED
Start: 2018-07-17 | End: 2018-07-17 | Stop reason: WASHOUT

## 2018-07-17 RX ORDER — PROMETHAZINE HYDROCHLORIDE 25 MG/ML
25 INJECTION, SOLUTION INTRAMUSCULAR; INTRAVENOUS PRN
Status: DISCONTINUED | OUTPATIENT
Start: 2018-07-17 | End: 2018-07-17 | Stop reason: HOSPADM

## 2018-07-17 RX ORDER — MIDAZOLAM HYDROCHLORIDE 1 MG/ML
INJECTION INTRAMUSCULAR; INTRAVENOUS PRN
Status: DISCONTINUED | OUTPATIENT
Start: 2018-07-17 | End: 2018-07-17 | Stop reason: SDUPTHER

## 2018-07-17 RX ORDER — LABETALOL HYDROCHLORIDE 5 MG/ML
5 INJECTION, SOLUTION INTRAVENOUS EVERY 10 MIN PRN
Status: DISCONTINUED | OUTPATIENT
Start: 2018-07-17 | End: 2018-07-17 | Stop reason: HOSPADM

## 2018-07-17 RX ORDER — ONDANSETRON 2 MG/ML
INJECTION INTRAMUSCULAR; INTRAVENOUS
Status: DISPENSED
Start: 2018-07-17 | End: 2018-07-18

## 2018-07-17 RX ORDER — FENTANYL CITRATE 50 UG/ML
25 INJECTION, SOLUTION INTRAMUSCULAR; INTRAVENOUS EVERY 5 MIN PRN
Status: DISCONTINUED | OUTPATIENT
Start: 2018-07-17 | End: 2018-07-17

## 2018-07-17 RX ORDER — FENTANYL CITRATE 50 UG/ML
25 INJECTION, SOLUTION INTRAMUSCULAR; INTRAVENOUS
Status: DISCONTINUED | OUTPATIENT
Start: 2018-07-17 | End: 2018-07-18

## 2018-07-17 RX ORDER — ONDANSETRON 2 MG/ML
4 INJECTION INTRAMUSCULAR; INTRAVENOUS ONCE
Status: COMPLETED | OUTPATIENT
Start: 2018-07-17 | End: 2018-07-17

## 2018-07-17 RX ORDER — CARVEDILOL 25 MG/1
25 TABLET ORAL 2 TIMES DAILY WITH MEALS
Status: DISCONTINUED | OUTPATIENT
Start: 2018-07-17 | End: 2018-07-18 | Stop reason: HOSPADM

## 2018-07-17 RX ORDER — ROPIVACAINE HYDROCHLORIDE 5 MG/ML
30 INJECTION, SOLUTION EPIDURAL; INFILTRATION; PERINEURAL ONCE
Status: COMPLETED | OUTPATIENT
Start: 2018-07-17 | End: 2018-07-17

## 2018-07-17 RX ORDER — MORPHINE SULFATE 2 MG/ML
2 INJECTION, SOLUTION INTRAMUSCULAR; INTRAVENOUS EVERY 5 MIN PRN
Status: DISCONTINUED | OUTPATIENT
Start: 2018-07-17 | End: 2018-07-17

## 2018-07-17 RX ORDER — GLYCOPYRROLATE 1 MG/5 ML
SYRINGE (ML) INTRAVENOUS PRN
Status: DISCONTINUED | OUTPATIENT
Start: 2018-07-17 | End: 2018-07-17 | Stop reason: SDUPTHER

## 2018-07-17 RX ORDER — FENTANYL CITRATE 50 UG/ML
50 INJECTION, SOLUTION INTRAMUSCULAR; INTRAVENOUS
Status: DISCONTINUED | OUTPATIENT
Start: 2018-07-17 | End: 2018-07-18

## 2018-07-17 RX ORDER — MEPERIDINE HYDROCHLORIDE 50 MG/ML
12.5 INJECTION INTRAMUSCULAR; INTRAVENOUS; SUBCUTANEOUS EVERY 5 MIN PRN
Status: DISCONTINUED | OUTPATIENT
Start: 2018-07-17 | End: 2018-07-17 | Stop reason: HOSPADM

## 2018-07-17 RX ORDER — FENTANYL CITRATE 50 UG/ML
100 INJECTION, SOLUTION INTRAMUSCULAR; INTRAVENOUS ONCE
Status: COMPLETED | OUTPATIENT
Start: 2018-07-17 | End: 2018-07-17

## 2018-07-17 RX ORDER — PROPOFOL 10 MG/ML
INJECTION, EMULSION INTRAVENOUS
Status: DISCONTINUED
Start: 2018-07-17 | End: 2018-07-17 | Stop reason: WASHOUT

## 2018-07-17 RX ORDER — PROPOFOL 10 MG/ML
INJECTION, EMULSION INTRAVENOUS PRN
Status: DISCONTINUED | OUTPATIENT
Start: 2018-07-17 | End: 2018-07-17 | Stop reason: SDUPTHER

## 2018-07-17 RX ORDER — FAMOTIDINE 20 MG/1
20 TABLET, FILM COATED ORAL EVERY MORNING
Status: DISCONTINUED | OUTPATIENT
Start: 2018-07-18 | End: 2018-07-18 | Stop reason: HOSPADM

## 2018-07-17 RX ORDER — OXYCODONE HYDROCHLORIDE AND ACETAMINOPHEN 5; 325 MG/1; MG/1
1 TABLET ORAL EVERY 6 HOURS PRN
Qty: 25 TABLET | Refills: 0 | Status: SHIPPED | OUTPATIENT
Start: 2018-07-17 | End: 2018-07-24

## 2018-07-17 RX ORDER — LEVALBUTEROL INHALATION SOLUTION 0.63 MG/3ML
0.63 SOLUTION RESPIRATORY (INHALATION) ONCE
Status: COMPLETED | OUTPATIENT
Start: 2018-07-17 | End: 2018-07-17

## 2018-07-17 RX ORDER — ALBUTEROL SULFATE 90 UG/1
AEROSOL, METERED RESPIRATORY (INHALATION) PRN
Status: DISCONTINUED | OUTPATIENT
Start: 2018-07-17 | End: 2018-07-17 | Stop reason: SDUPTHER

## 2018-07-17 RX ADMIN — DEXAMETHASONE SODIUM PHOSPHATE 10 MG: 10 INJECTION, SOLUTION INTRAMUSCULAR; INTRAVENOUS at 17:47

## 2018-07-17 RX ADMIN — HEPARIN SODIUM 5000 UNITS: 1000 INJECTION, SOLUTION INTRAVENOUS; SUBCUTANEOUS at 15:28

## 2018-07-17 RX ADMIN — LIDOCAINE HYDROCHLORIDE 60 MG: 20 INJECTION, SOLUTION INFILTRATION; PERINEURAL at 13:32

## 2018-07-17 RX ADMIN — FENTANYL CITRATE 50 MCG: 50 INJECTION, SOLUTION INTRAMUSCULAR; INTRAVENOUS at 13:32

## 2018-07-17 RX ADMIN — CARVEDILOL 25 MG: 25 TABLET, FILM COATED ORAL at 21:49

## 2018-07-17 RX ADMIN — PHENYLEPHRINE HYDROCHLORIDE 50 MCG: 10 INJECTION INTRAMUSCULAR; INTRAVENOUS; SUBCUTANEOUS at 15:58

## 2018-07-17 RX ADMIN — PROPOFOL 100 MCG/KG/MIN: 10 INJECTION, EMULSION INTRAVENOUS at 13:32

## 2018-07-17 RX ADMIN — FENTANYL CITRATE 25 MCG: 50 INJECTION, SOLUTION INTRAMUSCULAR; INTRAVENOUS at 19:09

## 2018-07-17 RX ADMIN — PHENYLEPHRINE HYDROCHLORIDE 100 MCG: 10 INJECTION INTRAMUSCULAR; INTRAVENOUS; SUBCUTANEOUS at 14:10

## 2018-07-17 RX ADMIN — MIDAZOLAM 1 MG: 1 INJECTION INTRAMUSCULAR; INTRAVENOUS at 13:18

## 2018-07-17 RX ADMIN — PHENYLEPHRINE HYDROCHLORIDE 100 MCG: 10 INJECTION INTRAMUSCULAR; INTRAVENOUS; SUBCUTANEOUS at 16:55

## 2018-07-17 RX ADMIN — Medication 10 ML: at 20:34

## 2018-07-17 RX ADMIN — PHENYLEPHRINE HYDROCHLORIDE 100 MCG: 10 INJECTION INTRAMUSCULAR; INTRAVENOUS; SUBCUTANEOUS at 16:40

## 2018-07-17 RX ADMIN — LEVALBUTEROL INHALATION 0.63MG/3ML 0.63 MG: 0.63 SOLUTION RESPIRATORY (INHALATION) at 18:45

## 2018-07-17 RX ADMIN — PHENYLEPHRINE HYDROCHLORIDE 100 MCG: 10 INJECTION INTRAMUSCULAR; INTRAVENOUS; SUBCUTANEOUS at 16:13

## 2018-07-17 RX ADMIN — PHENYLEPHRINE HYDROCHLORIDE 100 MCG: 10 INJECTION INTRAMUSCULAR; INTRAVENOUS; SUBCUTANEOUS at 16:25

## 2018-07-17 RX ADMIN — PHENYLEPHRINE HYDROCHLORIDE 100 MCG: 10 INJECTION INTRAMUSCULAR; INTRAVENOUS; SUBCUTANEOUS at 14:16

## 2018-07-17 RX ADMIN — ROPIVACAINE HYDROCHLORIDE 30 ML: 5 INJECTION, SOLUTION EPIDURAL; INFILTRATION; PERINEURAL at 13:21

## 2018-07-17 RX ADMIN — HYDRALAZINE HYDROCHLORIDE 5 MG: 20 INJECTION INTRAMUSCULAR; INTRAVENOUS at 18:41

## 2018-07-17 RX ADMIN — FENTANYL CITRATE 50 MCG: 50 INJECTION INTRAMUSCULAR; INTRAVENOUS at 23:20

## 2018-07-17 RX ADMIN — FENTANYL CITRATE 25 MCG: 50 INJECTION, SOLUTION INTRAMUSCULAR; INTRAVENOUS at 19:17

## 2018-07-17 RX ADMIN — FENTANYL CITRATE 100 MCG: 50 INJECTION, SOLUTION INTRAMUSCULAR; INTRAVENOUS at 13:18

## 2018-07-17 RX ADMIN — ONDANSETRON 4 MG: 2 INJECTION INTRAMUSCULAR; INTRAVENOUS at 19:37

## 2018-07-17 RX ADMIN — HYDRALAZINE HYDROCHLORIDE 5 MG: 20 INJECTION INTRAMUSCULAR; INTRAVENOUS at 19:14

## 2018-07-17 RX ADMIN — FENTANYL CITRATE 25 MCG: 50 INJECTION, SOLUTION INTRAMUSCULAR; INTRAVENOUS at 14:38

## 2018-07-17 RX ADMIN — ALBUTEROL SULFATE 1 PUFF: 108 AEROSOL, METERED RESPIRATORY (INHALATION) at 17:55

## 2018-07-17 RX ADMIN — MIDAZOLAM HYDROCHLORIDE 2 MG: 1 INJECTION, SOLUTION INTRAMUSCULAR; INTRAVENOUS at 13:32

## 2018-07-17 RX ADMIN — ALBUTEROL SULFATE 2 PUFF: 108 AEROSOL, METERED RESPIRATORY (INHALATION) at 17:54

## 2018-07-17 RX ADMIN — FENTANYL CITRATE 25 MCG: 50 INJECTION, SOLUTION INTRAMUSCULAR; INTRAVENOUS at 14:22

## 2018-07-17 RX ADMIN — SODIUM CHLORIDE: 9 INJECTION, SOLUTION INTRAVENOUS at 13:15

## 2018-07-17 RX ADMIN — FENTANYL CITRATE 50 MCG: 50 INJECTION INTRAMUSCULAR; INTRAVENOUS at 20:32

## 2018-07-17 RX ADMIN — FENTANYL CITRATE 50 MCG: 50 INJECTION INTRAMUSCULAR; INTRAVENOUS at 21:50

## 2018-07-17 RX ADMIN — PHENYLEPHRINE HYDROCHLORIDE 50 MCG: 10 INJECTION INTRAMUSCULAR; INTRAVENOUS; SUBCUTANEOUS at 15:52

## 2018-07-17 RX ADMIN — RACEPINEPHRINE HYDROCHLORIDE 11.25 MG: 11.25 SOLUTION RESPIRATORY (INHALATION) at 18:34

## 2018-07-17 RX ADMIN — FENTANYL CITRATE 25 MCG: 50 INJECTION, SOLUTION INTRAMUSCULAR; INTRAVENOUS at 19:26

## 2018-07-17 RX ADMIN — Medication 0.2 MG: at 13:35

## 2018-07-17 RX ADMIN — HYDRALAZINE HYDROCHLORIDE 5 MG: 20 INJECTION INTRAMUSCULAR; INTRAVENOUS at 19:07

## 2018-07-17 RX ADMIN — FENTANYL CITRATE 25 MCG: 50 INJECTION, SOLUTION INTRAMUSCULAR; INTRAVENOUS at 16:09

## 2018-07-17 RX ADMIN — FENTANYL CITRATE 25 MCG: 50 INJECTION, SOLUTION INTRAMUSCULAR; INTRAVENOUS at 18:53

## 2018-07-17 RX ADMIN — HYDRALAZINE HYDROCHLORIDE 5 MG: 20 INJECTION INTRAMUSCULAR; INTRAVENOUS at 19:01

## 2018-07-17 RX ADMIN — PROPOFOL 100 MG: 10 INJECTION, EMULSION INTRAVENOUS at 14:06

## 2018-07-17 ASSESSMENT — PULMONARY FUNCTION TESTS
PIF_VALUE: 20
PIF_VALUE: 3
PIF_VALUE: 20
PIF_VALUE: 1
PIF_VALUE: 20
PIF_VALUE: 2
PIF_VALUE: 20
PIF_VALUE: 1
PIF_VALUE: 20
PIF_VALUE: 1
PIF_VALUE: 20
PIF_VALUE: 23
PIF_VALUE: 20
PIF_VALUE: 1
PIF_VALUE: 20
PIF_VALUE: 2
PIF_VALUE: 20
PIF_VALUE: 16
PIF_VALUE: 20
PIF_VALUE: 25
PIF_VALUE: 20
PIF_VALUE: 16
PIF_VALUE: 1
PIF_VALUE: 20
PIF_VALUE: 2
PIF_VALUE: 20
PIF_VALUE: 15
PIF_VALUE: 20
PIF_VALUE: 10
PIF_VALUE: 20
PIF_VALUE: 3
PIF_VALUE: 20
PIF_VALUE: 2
PIF_VALUE: 20
PIF_VALUE: 2
PIF_VALUE: 20

## 2018-07-17 ASSESSMENT — PAIN DESCRIPTION - DESCRIPTORS
DESCRIPTORS: DISCOMFORT
DESCRIPTORS: ACHING
DESCRIPTORS: ACHING
DESCRIPTORS: ACHING;BURNING
DESCRIPTORS: ACHING;BURNING

## 2018-07-17 ASSESSMENT — PAIN DESCRIPTION - LOCATION
LOCATION: ARM

## 2018-07-17 ASSESSMENT — PAIN - FUNCTIONAL ASSESSMENT: PAIN_FUNCTIONAL_ASSESSMENT: 0-10

## 2018-07-17 ASSESSMENT — PAIN SCALES - GENERAL
PAINLEVEL_OUTOF10: 9
PAINLEVEL_OUTOF10: 0
PAINLEVEL_OUTOF10: 10
PAINLEVEL_OUTOF10: 0
PAINLEVEL_OUTOF10: 8
PAINLEVEL_OUTOF10: 0
PAINLEVEL_OUTOF10: 0
PAINLEVEL_OUTOF10: 8
PAINLEVEL_OUTOF10: 9
PAINLEVEL_OUTOF10: 9
PAINLEVEL_OUTOF10: 10
PAINLEVEL_OUTOF10: 0

## 2018-07-17 ASSESSMENT — LIFESTYLE VARIABLES: SMOKING_STATUS: 1

## 2018-07-17 ASSESSMENT — PAIN DESCRIPTION - PAIN TYPE
TYPE: SURGICAL PAIN

## 2018-07-17 ASSESSMENT — PAIN DESCRIPTION - ORIENTATION
ORIENTATION: LEFT

## 2018-07-17 ASSESSMENT — PAIN DESCRIPTION - PROGRESSION: CLINICAL_PROGRESSION: RAPIDLY WORSENING

## 2018-07-17 NOTE — OP NOTE
Fei Hernandez  1985      DATE OF PROCEDURE: 7/17/2018     SURGEON: FRANC Cox: Dr. Peña Salas DIAGNOSIS: ESRD     POSTOPERATIVE DIAGNOSIS: Same    OPERATION: Revision of left brachiobasilic arteriovenous fistula by upper arm transposition. ANESTHESIA: General     ESTIMATED BLOOD LOSS: less than 50 ml     COMPLICATIONS: None    DESCRIPTION OF PROCEDURE: The patient was identified and the procedure was confirmed. The left arm was prepped and draped in the usual sterile fashion. Lidocaine 1% mixed with 0.25% Marcaine was used for local anesthesia. A skin incision was made in the medial aspect of the distal upper arm and carried down through the subcutaneous tissue. The basilic vein was identified and freed from the surrounding tissues. Branches were divided between silk ties. A longitudinal incision was extended up the upper arm, the basilic vein was mobilized up to its junction with the axillary vein. The basilic vein proximally was ligated. The vein was noted to be thickened and flow did not appear to be flowing distally in a proximal area of the vein. Coming through a previously ligated branch the vein was opened in this area. There was a great deal of scar, and likely valves which were occlusive. This was cleaned off and removed leaving healthy vein. This was closed in a running fashion with 6.0 prolene. It was flushed and there was no leak and the vein was now distending very well. The vein was then marked for orientation and pulled through a subcutaneous tunnel using the Ellie-Wikathleen tunneler. An end to end annastasomosis was created with 6.0 prolene running. Both ends were spatulated. After completing the anastomosis, the clamps were released, and good flow was appreciated through the fistula, and a 2+ radial pulse was appreciated at the wrist. Hemostasis was obtained, and the incisions were irrigated with saline solution.  The incisions were approximated with multiple layers of Vicryl suture, and Dermabond was applied to the skin incisions in the operating room. Needle, sponge and instrument counts were reported as correct x2. The patient tolerated the procedure and was transferred to the recovery area in satisfactory condition.      Olvin Pink    CC :     PCP : Martir Cardoza MD  Nephrologist : Dr. Rich Castorena : [facility], Dr. Eleanor Vail

## 2018-07-17 NOTE — H&P
VASCULAR SURGERY  H&P NOTE  2018      HPI  Gage Mosquera is a 28 y.o. female who presents for LUE AVF revision stage II. She notes pain around the stitches in her [facility] and requests that they be removed. Past Medical History:   Diagnosis Date    JOLLY (acute kidney injury) ([facility] [address] 2016    AVF (arteriovenous fistula) ([facility] [address] 2018    Chronic kidney disease     DM type 1 (diabetes mellitus, type 1) ([facility] [address]     Encounter regarding vascular access for dialysis for ESRD ([facility] [address] 2018    Hemodialysis patient ([facility] [address]     amrita plattt     Hypertension     Tobacco abuse 2016       Past Surgical History:   Procedure Laterality Date     SECTION      CYST INCISION AND DRAINAGE  2011    perirectal abscess. [facility]. Dr. Cristo Merritt      FRACTURE SURGERY      fracture right ulnar, left tibia, and pelvis    OTHER SURGICAL HISTORY  /    open reduction internal fixation left radial shaft    OTHER SURGICAL HISTORY  2016    pericardial window    OTHER SURGICAL HISTORY  2016     r tesio insertion    OTHER SURGICAL HISTORY Left 2017    insertion a-v fistula left arm    NE INSERT NON-TUNNEL CV CATH N/A 5/10/2018    TESIO CATHETER INSERTION performed by Salvatore Chavez MD at [facility] OR       Medications Prior to Admission:    Prior to Admission medications    Medication Sig Start Date End Date Taking?  Authorizing Provider   insulin aspart (NOVOLOG FLEXPEN) 100 UNIT/ML injection pen Inject 10-28 Units into the skin 3 times daily (before meals) 10 units PLUS Sliding Scale: 2-18 additional units   Yes Historical Provider, MD   Lactobacillus (PROBIOTIC ACIDOPHILUS) CAPS Take 1 capsule by mouth every morning   Yes Historical Provider, MD   Cholecalciferol (VITAMIN D3) 2000 units CAPS Take 1 capsule by mouth every morning    Yes Historical Provider, MD   B Complex-C-Folic Acid (YOLANDA-JACOB) TABS Take 1 tablet by mouth Daily with supper    Yes Historical Provider, MD   insulin glargine (TOUJEO SOLOSTAR) 300 UNIT/ML injection pen Inject 30 Units into the skin nightly    Yes Historical Provider, MD   carvedilol (COREG) 12.5 MG tablet Take 25 mg by mouth 2 times daily (with meals)    Yes Historical Provider, MD   calcium acetate (PHOSLO) 667 MG capsule Take 667 mg by mouth 3 times daily (with meals)   Yes Historical Provider, MD   famotidine (PEPCID) 20 MG tablet Take 1 tablet by mouth daily  Patient taking differently: Take 20 mg by mouth every morning  8/24/16  Yes Kin Montes MD   amLODIPine (NORVASC) 5 MG tablet Take 1 tablet by mouth daily  Patient taking differently: Take 5 mg by mouth every morning  4/7/16  Yes Malou Rose, DO       No Known Allergies    Family History   Problem Relation Age of Onset    Stroke Mother     Cancer Father     Diabetes Paternal Aunt        Social History   Substance Use Topics    Smoking status: Current Every Day Smoker     Packs/day: 0.25     Years: 15.00     Types: Cigarettes    Smokeless tobacco: Never Used    Alcohol use No      Comment: on occasion ( not since pregnant)         Review of Systems   General ROS: fever (-), chills (-)  Respiratory ROS: SOB (-)  Cardiovascular ROS: CP (-)  Gastrointestinal ROS: Nausea (-), vomiting (-), diarrhea (-), constipation (-), melena (-), hematochezia (-)        PHYSICAL EXAM:    Vitals:    07/17/18 1202   BP: (!) 165/82   Pulse: 91   Resp: 20   Temp: 99 °F (37.2 °C)   SpO2: 97%       General Appearance:  Alert, awake, cooperative, lying in bed   Lungs:  Normal work of breathing   Heart:  Regular rate   Abdomen:  Soft, no tenderness, non distended   Extremities:  LUE fistula w/ palpable thrill.  Palpable pulses in all 4 extremities      LABS:  CBC  Recent Labs      07/17/18   1230   WBC  5.6   HGB  10.6*   HCT  30.1*   PLT  180     BMP  No results for input(s): NA, K, CL, CO2, BUN, CREATININE, CALCIUM in the last 72 hours.    Invalid input(s): GLU  Liver Function  No results for input(s): AMYLASE, LIPASE, BILITOT, BILIDIR, AST, ALT, ALKPHOS, PROT, LABALBU in the last 72 hours. No results for input(s): LACTATE in the last 72 hours. No results for input(s): INR, PTT in the last 72 hours. Invalid input(s): PT    RADIOLOGY  No results found.       ASSESSMENT:  28 y.o. female with CKD here for stage II LUE AVF revision    PLAN:  LUE AVF revision    Electronically signed by Noman Candelaria MD on 7/17/18 at 12:49 PM

## 2018-07-18 ENCOUNTER — TELEPHONE (OUTPATIENT)
Dept: VASCULAR SURGERY | Age: 33
End: 2018-07-18

## 2018-07-18 VITALS
SYSTOLIC BLOOD PRESSURE: 117 MMHG | HEART RATE: 101 BPM | RESPIRATION RATE: 15 BRPM | OXYGEN SATURATION: 100 % | BODY MASS INDEX: 28.49 KG/M2 | TEMPERATURE: 97.2 F | WEIGHT: 141.31 LBS | HEIGHT: 59 IN | DIASTOLIC BLOOD PRESSURE: 70 MMHG

## 2018-07-18 LAB
ANION GAP SERPL CALCULATED.3IONS-SCNC: 15 MMOL/L (ref 7–16)
BUN BLDV-MCNC: 33 MG/DL (ref 6–20)
CALCIUM SERPL-MCNC: 8.9 MG/DL (ref 8.6–10.2)
CHLORIDE BLD-SCNC: 92 MMOL/L (ref 98–107)
CO2: 24 MMOL/L (ref 22–29)
CREAT SERPL-MCNC: 7.7 MG/DL (ref 0.5–1)
GFR AFRICAN AMERICAN: 7
GFR NON-AFRICAN AMERICAN: 7 ML/MIN/1.73
GLUCOSE BLD-MCNC: 417 MG/DL (ref 74–109)
METER GLUCOSE: 114 MG/DL (ref 70–110)
METER GLUCOSE: 135 MG/DL (ref 70–110)
METER GLUCOSE: 167 MG/DL (ref 70–110)
METER GLUCOSE: 223 MG/DL (ref 70–110)
METER GLUCOSE: 287 MG/DL (ref 70–110)
METER GLUCOSE: 395 MG/DL (ref 70–110)
METER GLUCOSE: 477 MG/DL (ref 70–110)
METER GLUCOSE: 497 MG/DL (ref 70–110)
METER GLUCOSE: 65 MG/DL (ref 70–110)
METER GLUCOSE: 89 MG/DL (ref 70–110)
METER GLUCOSE: <40 MG/DL (ref 70–110)
POTASSIUM SERPL-SCNC: 5 MMOL/L (ref 3.5–5)
SODIUM BLD-SCNC: 131 MMOL/L (ref 132–146)

## 2018-07-18 PROCEDURE — 82962 GLUCOSE BLOOD TEST: CPT

## 2018-07-18 PROCEDURE — 96375 TX/PRO/DX INJ NEW DRUG ADDON: CPT

## 2018-07-18 PROCEDURE — 6360000002 HC RX W HCPCS: Performed by: STUDENT IN AN ORGANIZED HEALTH CARE EDUCATION/TRAINING PROGRAM

## 2018-07-18 PROCEDURE — G0378 HOSPITAL OBSERVATION PER HR: HCPCS

## 2018-07-18 PROCEDURE — 36415 COLL VENOUS BLD VENIPUNCTURE: CPT

## 2018-07-18 PROCEDURE — 2580000003 HC RX 258: Performed by: STUDENT IN AN ORGANIZED HEALTH CARE EDUCATION/TRAINING PROGRAM

## 2018-07-18 PROCEDURE — 99024 POSTOP FOLLOW-UP VISIT: CPT | Performed by: SURGERY

## 2018-07-18 PROCEDURE — 96374 THER/PROPH/DIAG INJ IV PUSH: CPT

## 2018-07-18 PROCEDURE — G0257 UNSCHED DIALYSIS ESRD PT HOS: HCPCS

## 2018-07-18 PROCEDURE — 6370000000 HC RX 637 (ALT 250 FOR IP): Performed by: STUDENT IN AN ORGANIZED HEALTH CARE EDUCATION/TRAINING PROGRAM

## 2018-07-18 PROCEDURE — 96376 TX/PRO/DX INJ SAME DRUG ADON: CPT

## 2018-07-18 PROCEDURE — 2700000000 HC OXYGEN THERAPY PER DAY

## 2018-07-18 PROCEDURE — 80048 BASIC METABOLIC PNL TOTAL CA: CPT

## 2018-07-18 PROCEDURE — 90935 HEMODIALYSIS ONE EVALUATION: CPT | Performed by: INTERNAL MEDICINE

## 2018-07-18 PROCEDURE — 94660 CPAP INITIATION&MGMT: CPT

## 2018-07-18 RX ORDER — DEXTROSE MONOHYDRATE 25 G/50ML
12.5 INJECTION, SOLUTION INTRAVENOUS PRN
Status: DISCONTINUED | OUTPATIENT
Start: 2018-07-18 | End: 2018-07-18 | Stop reason: HOSPADM

## 2018-07-18 RX ORDER — PSEUDOEPHEDRINE HCL 30 MG
100 TABLET ORAL 2 TIMES DAILY
Qty: 60 CAPSULE | Refills: 1 | Status: SHIPPED | OUTPATIENT
Start: 2018-07-18 | End: 2018-08-17

## 2018-07-18 RX ORDER — HYDROCODONE BITARTRATE AND ACETAMINOPHEN 5; 325 MG/1; MG/1
1 TABLET ORAL EVERY 4 HOURS PRN
Status: DISCONTINUED | OUTPATIENT
Start: 2018-07-18 | End: 2018-07-18

## 2018-07-18 RX ORDER — HYDRALAZINE HYDROCHLORIDE 20 MG/ML
10 INJECTION INTRAMUSCULAR; INTRAVENOUS EVERY 30 MIN PRN
Status: DISCONTINUED | OUTPATIENT
Start: 2018-07-18 | End: 2018-07-18 | Stop reason: HOSPADM

## 2018-07-18 RX ORDER — OXYCODONE HYDROCHLORIDE AND ACETAMINOPHEN 5; 325 MG/1; MG/1
1 TABLET ORAL EVERY 4 HOURS PRN
Status: DISCONTINUED | OUTPATIENT
Start: 2018-07-18 | End: 2018-07-18 | Stop reason: HOSPADM

## 2018-07-18 RX ORDER — HYDROCODONE BITARTRATE AND ACETAMINOPHEN 5; 325 MG/1; MG/1
2 TABLET ORAL EVERY 4 HOURS PRN
Status: DISCONTINUED | OUTPATIENT
Start: 2018-07-18 | End: 2018-07-18

## 2018-07-18 RX ORDER — 0.9 % SODIUM CHLORIDE 0.9 %
500 INTRAVENOUS SOLUTION INTRAVENOUS ONCE
Status: COMPLETED | OUTPATIENT
Start: 2018-07-18 | End: 2018-07-18

## 2018-07-18 RX ORDER — SODIUM CHLORIDE 9 MG/ML
INJECTION, SOLUTION INTRAVENOUS CONTINUOUS
Status: DISCONTINUED | OUTPATIENT
Start: 2018-07-18 | End: 2018-07-18

## 2018-07-18 RX ORDER — OXYCODONE HYDROCHLORIDE AND ACETAMINOPHEN 5; 325 MG/1; MG/1
2 TABLET ORAL EVERY 4 HOURS PRN
Status: DISCONTINUED | OUTPATIENT
Start: 2018-07-18 | End: 2018-07-18 | Stop reason: HOSPADM

## 2018-07-18 RX ORDER — NICOTINE POLACRILEX 4 MG
15 LOZENGE BUCCAL PRN
Status: DISCONTINUED | OUTPATIENT
Start: 2018-07-18 | End: 2018-07-18 | Stop reason: HOSPADM

## 2018-07-18 RX ORDER — DOCUSATE SODIUM 100 MG/1
100 CAPSULE, LIQUID FILLED ORAL 2 TIMES DAILY
Status: DISCONTINUED | OUTPATIENT
Start: 2018-07-18 | End: 2018-07-18 | Stop reason: HOSPADM

## 2018-07-18 RX ORDER — DEXTROSE MONOHYDRATE 50 MG/ML
100 INJECTION, SOLUTION INTRAVENOUS PRN
Status: DISCONTINUED | OUTPATIENT
Start: 2018-07-18 | End: 2018-07-18 | Stop reason: HOSPADM

## 2018-07-18 RX ORDER — 0.9 % SODIUM CHLORIDE 0.9 %
1000 INTRAVENOUS SOLUTION INTRAVENOUS ONCE
Status: COMPLETED | OUTPATIENT
Start: 2018-07-18 | End: 2018-07-18

## 2018-07-18 RX ORDER — INSULIN GLARGINE 100 [IU]/ML
30 INJECTION, SOLUTION SUBCUTANEOUS NIGHTLY
Status: DISCONTINUED | OUTPATIENT
Start: 2018-07-18 | End: 2018-07-18 | Stop reason: HOSPADM

## 2018-07-18 RX ORDER — LABETALOL HYDROCHLORIDE 5 MG/ML
10 INJECTION, SOLUTION INTRAVENOUS EVERY 30 MIN PRN
Status: DISCONTINUED | OUTPATIENT
Start: 2018-07-18 | End: 2018-07-18 | Stop reason: HOSPADM

## 2018-07-18 RX ADMIN — SODIUM CHLORIDE: 9 INJECTION, SOLUTION INTRAVENOUS at 03:16

## 2018-07-18 RX ADMIN — DEXTROSE 15 G: 15 GEL ORAL at 06:39

## 2018-07-18 RX ADMIN — HYDRALAZINE HYDROCHLORIDE 10 MG: 20 INJECTION INTRAMUSCULAR; INTRAVENOUS at 00:24

## 2018-07-18 RX ADMIN — INSULIN LISPRO 18 UNITS: 100 INJECTION, SOLUTION INTRAVENOUS; SUBCUTANEOUS at 00:23

## 2018-07-18 RX ADMIN — DEXTROSE 15 G: 15 GEL ORAL at 06:31

## 2018-07-18 RX ADMIN — SODIUM CHLORIDE 1000 ML: 9 INJECTION, SOLUTION INTRAVENOUS at 02:05

## 2018-07-18 RX ADMIN — AMLODIPINE BESYLATE 5 MG: 5 TABLET ORAL at 08:05

## 2018-07-18 RX ADMIN — OXYCODONE HYDROCHLORIDE AND ACETAMINOPHEN 1 TABLET: 5; 325 TABLET ORAL at 15:03

## 2018-07-18 RX ADMIN — DEXTROSE MONOHYDRATE 12.5 G: 25 INJECTION, SOLUTION INTRAVENOUS at 06:41

## 2018-07-18 RX ADMIN — INSULIN LISPRO 15 UNITS: 100 INJECTION, SOLUTION INTRAVENOUS; SUBCUTANEOUS at 03:48

## 2018-07-18 RX ADMIN — SODIUM CHLORIDE 500 ML: 9 INJECTION, SOLUTION INTRAVENOUS at 03:25

## 2018-07-18 RX ADMIN — INSULIN LISPRO 3 UNITS: 100 INJECTION, SOLUTION INTRAVENOUS; SUBCUTANEOUS at 05:05

## 2018-07-18 RX ADMIN — INSULIN GLARGINE 30 UNITS: 100 INJECTION, SOLUTION SUBCUTANEOUS at 00:22

## 2018-07-18 RX ADMIN — INSULIN LISPRO 26 UNITS: 100 INJECTION, SOLUTION INTRAVENOUS; SUBCUTANEOUS at 02:49

## 2018-07-18 RX ADMIN — FENTANYL CITRATE 50 MCG: 50 INJECTION INTRAMUSCULAR; INTRAVENOUS at 03:51

## 2018-07-18 RX ADMIN — INSULIN LISPRO 23 UNITS: 100 INJECTION, SOLUTION INTRAVENOUS; SUBCUTANEOUS at 01:49

## 2018-07-18 RX ADMIN — OXYCODONE HYDROCHLORIDE AND ACETAMINOPHEN 2 TABLET: 5; 325 TABLET ORAL at 08:58

## 2018-07-18 RX ADMIN — DEXTROSE MONOHYDRATE 100 ML/HR: 50 INJECTION, SOLUTION INTRAVENOUS at 07:10

## 2018-07-18 RX ADMIN — CARVEDILOL 25 MG: 25 TABLET, FILM COATED ORAL at 08:05

## 2018-07-18 RX ADMIN — FAMOTIDINE 20 MG: 20 TABLET, FILM COATED ORAL at 08:05

## 2018-07-18 RX ADMIN — DEXTROSE MONOHYDRATE 12.5 G: 25 INJECTION, SOLUTION INTRAVENOUS at 07:10

## 2018-07-18 ASSESSMENT — PAIN SCALES - GENERAL
PAINLEVEL_OUTOF10: 10
PAINLEVEL_OUTOF10: 5
PAINLEVEL_OUTOF10: 0
PAINLEVEL_OUTOF10: 10
PAINLEVEL_OUTOF10: 0

## 2018-07-18 NOTE — FLOWSHEET NOTE
07/18/18 1455   Vital Signs   /76   Temp 97.2 °F (36.2 °C)   Pulse 105   Weight 141 lb 5 oz (64.1 kg)   Weight Method Actual   Percent Weight Change -4.33   Post-Hemodialysis Assessment   Post-Treatment Procedures Catheter capped, clamped with Citrate x 2 ports   Machine Disinfection Process Exterior Machine Disinfection   Rinseback Volume (ml) 300 ml   Total Liters Processed (l/min) 71.6 l/min   Dialyzer Clearance Moderately streaked   Duration of Treatment (minutes) 240 minutes   Hemodialysis Intake (ml) 300 ml   Hemodialysis Output (ml) 3600 ml   NET Removed (ml) 3000 ml   Tolerated Treatment Good   Patient Response to Treatment Tolerated well,  no distress   Bilateral Breath Sounds Clear   Edema Facial   Physician Notified?  No

## 2018-07-18 NOTE — CONSULTS
(L) 05/08/2018    LABALBU 3.5 04/01/2018    LABALBU 4.1 01/25/2018       Assessment and Plan:    1. Esrd: for hd today per her usual chronic orders  2. Sp revision left avf with basilic vein transposition  3. Dm: insulin being adjusted  4. Htn: follow on amlodipine and coreg  5. resp distress: 3 l removed with hd today    Tad Orn.  Devin Gould MD

## 2018-07-18 NOTE — DISCHARGE SUMMARY
[address] [address]  [address] [facility] [address] [facility] [address]    Phone:  [phone]   · oxyCODONE-acetaminophen 5-325 MG per tablet     You can get these medications from any pharmacy    Bring a paper prescription for each of these medications  · docusate 100 MG Caps         Patient Instructions:     Elevate Left upper extremity at all times  Use tubigrip (sleeve) on this arm at all times    [facility] may be drowsy or lightheaded after receiving sedation or anesthesia. A responsible person should be with you for the next 24 hours. Please follow the instructions checked below:    DIET INSTRUCTIONS:  [x]Start with light diet and progress to your normal diet as you feel like eating. If you experience nausea or repeated episodes of vomiting which persist beyond 12-24 hours, notify your doctor. []Other     ACTIVITY INSTRUCTIONS:  [x]Rest today. Increase activity as tolerated    [x]Elevate operative limb   [x]No heavy lifting or strenuous activity     [x]No driving for 2 days  []Other     WOUND/DRESSING INSTRUCTIONS:  Always ensure you and your care giver clean hands before and after caring for the wound. []May shower      [x]May bathe      [x]Derma bond dressing-Do not apply lotion, gel, or liquid to wound while the derma bond is in place. []Other         MEDICATION INSTRUCTIONS:  [x]Prescriptions sent with you. Use as directed. When taking pain medications, you may experience dizziness or drowsiness. Do not drink alcohol or drive when taking these medications. [x]You may take a non-prescription headache remedy, preferably one that does not contain aspirin.     FOLLOW-UP CARE:  [x]Call the office at [phone] for follow-up appointment in 2 weeks    Call physician if they or any other problems occur:  - Fever over 101°    - Redness, swelling, hardness or warmth at the operative site  - Swelling of arm  - Unrelieved nausea    - Foul smelling or cloudy drainage at the operative site   - Unrelieved pain    - Blood soaked dressing.  (Some oozing may be normal)  - Pain in Hand  - Blue or black fingers      Signed:  Bert Olmstead  7/18/2018  8:43 AM    Haylee Norris

## 2018-07-18 NOTE — PLAN OF CARE
Problem: Pain:  Goal: Control of acute pain  Control of acute pain   Outcome: Met This Shift      Problem: Falls - Risk of:  Goal: Will remain free from falls  Will remain free from falls  Outcome: Ongoing      Problem: Fluid Volume:  Goal: Will show no signs or symptoms of fluid imbalance  Will show no signs or symptoms of fluid imbalance  Outcome: Met This Shift      Problem: Skin Integrity:  Goal: Status of oral mucous membranes will improve  Status of oral mucous membranes will improve  Outcome: Met This Shift

## 2018-07-18 NOTE — PROGRESS NOTES
1804-Patient admitted to PACU. Patient soon becomes stridurous during report. Patient very sleepy and nasal airway inserted. Patient then appears  to begin to gag after a few minutes. Nasal airway removed. Patient then noted to be stuggling to breathe. Patient given air with 15 liters ambo bag. Saturation remains stable. This ventilation continues for approximately 10 minutes  . Dr Julito Cantu is at bedside with 2 CRNA's, resident, and 2 nurses. Respiratory here for aerosols X 2 and then patient place on Venti mask followed by N/C as patient improves. Patient treated for hypertension, pain and nausea carefully with frequent V/S during PACU care as Dr. Julito Cantu was updated several times. Patient transferred to CVICU at 2005 after stabilized.
[facility] Quality Flow/Interdisciplinary Rounds Progress Note        Quality Flow Rounds held on July 18, 2018    Disciplines Attending:  Nursing Unit Leadership, Bedside RN. Kiley Ferrer was admitted on 7/17/2018 11:58 AM    Anticipated Discharge Date:  Expected Discharge Date: 07/18/18    Disposition:    Edgar Score:  Edgar Scale Score: 23    Readmission Risk              Risk of Unplanned Readmission:        19             Discussed patient goal for the day, patient clinical progression, and barriers to discharge. The following Goal(s) of the Day/Commitment(s) have been identified:  Likely discharge home this afternoon following dialysis today.       Trace Ingram  July 18, 2018
07/17/2018    APTT 59.1 (H) 05/13/2018    K 5.0 07/18/2018    BUN 33 (H) 07/18/2018    CREATININE 7.7 (H) 07/18/2018       A/P  28 y.o. female with resolved respiratory insufficiency s/p Revision of left brachiobasilic arteriovenous fistula by upper arm transposition 7/18 with regional block    PO pain meds  Pulmonary toilet, nebs prn  Elevate LUE, tubigrip  Home anti htn, prns  Renal carb control diet  ISS, lantus - monitor BG  Nephro for HD    DC after HD today provided BG controlled      Court Zavaleta
call the pre-op area if you have concerns about your blood sugar [phone]. [] Use your inhalers the morning of surgery. Bring your emergency inhaler with you day of surgery. [] Follow physician instructions regarding any blood thinners you may be taking. WHAT TO EXPECT:  [x] The day of surgery you will be greeted and  checked in by the [employer] .  A nurse will greet you in accordance to the time you are needed in the pre-op area to prepare you for surgery. Please do not be discouraged if you are not greeted in the order you arrive as there are many variables that are involved in patient preparation. Your patience is greatly appreciated as you wait for your nurse. Please bring in items such as: books, magazines, newspapers, electronics, or any other items  to occupy your time in the waiting area. [x]  Delays may occur with surgery and staff will make a sincere effort to keep you informed of delays. If any delays occur with your procedure, we apologize ahead of time for your inconvenience as we recognize the value of your time.

## 2018-07-18 NOTE — TELEPHONE ENCOUNTER
----- Message from Blanco Solorio MD sent at 7/18/2018 12:08 AM EDT -----  Katlin Stevenson    Please have her fu in 2-3 weeks    gerald

## 2018-07-24 ENCOUNTER — OFFICE VISIT (OUTPATIENT)
Dept: FAMILY MEDICINE CLINIC | Age: 33
End: 2018-07-24
Payer: MEDICARE

## 2018-07-24 VITALS
OXYGEN SATURATION: 97 % | DIASTOLIC BLOOD PRESSURE: 70 MMHG | TEMPERATURE: 98.5 F | HEIGHT: 59 IN | SYSTOLIC BLOOD PRESSURE: 132 MMHG | RESPIRATION RATE: 16 BRPM | WEIGHT: 132.12 LBS | BODY MASS INDEX: 26.64 KG/M2 | HEART RATE: 107 BPM

## 2018-07-24 DIAGNOSIS — I10 HTN (HYPERTENSION), BENIGN: Chronic | ICD-10-CM

## 2018-07-24 DIAGNOSIS — N18.6 ESRD (END STAGE RENAL DISEASE) ON DIALYSIS (HCC): Chronic | ICD-10-CM

## 2018-07-24 DIAGNOSIS — Z01.818 PRE-OP EXAM: Primary | ICD-10-CM

## 2018-07-24 DIAGNOSIS — Z99.2 ESRD (END STAGE RENAL DISEASE) ON DIALYSIS (HCC): Chronic | ICD-10-CM

## 2018-07-24 PROCEDURE — 2022F DILAT RTA XM EVC RTNOPTHY: CPT | Performed by: NURSE PRACTITIONER

## 2018-07-24 PROCEDURE — G8427 DOCREV CUR MEDS BY ELIG CLIN: HCPCS | Performed by: NURSE PRACTITIONER

## 2018-07-24 PROCEDURE — 4004F PT TOBACCO SCREEN RCVD TLK: CPT | Performed by: NURSE PRACTITIONER

## 2018-07-24 PROCEDURE — 99214 OFFICE O/P EST MOD 30 MIN: CPT | Performed by: NURSE PRACTITIONER

## 2018-07-24 PROCEDURE — 3046F HEMOGLOBIN A1C LEVEL >9.0%: CPT | Performed by: NURSE PRACTITIONER

## 2018-07-24 PROCEDURE — G8419 CALC BMI OUT NRM PARAM NOF/U: HCPCS | Performed by: NURSE PRACTITIONER

## 2018-07-24 RX ORDER — INSULIN LISPRO 100 [IU]/ML
INJECTION, SOLUTION INTRAVENOUS; SUBCUTANEOUS 4 TIMES DAILY
Refills: 1 | COMMUNITY
Start: 2018-05-22 | End: 2018-11-26

## 2018-07-24 RX ORDER — UBIDECARENONE 75 MG
CAPSULE ORAL
COMMUNITY
End: 2019-07-09

## 2018-07-24 ASSESSMENT — PATIENT HEALTH QUESTIONNAIRE - PHQ9
SUM OF ALL RESPONSES TO PHQ QUESTIONS 1-9: 0
1. LITTLE INTEREST OR PLEASURE IN DOING THINGS: 0
SUM OF ALL RESPONSES TO PHQ9 QUESTIONS 1 & 2: 0
2. FEELING DOWN, DEPRESSED OR HOPELESS: 0

## 2018-07-24 ASSESSMENT — ENCOUNTER SYMPTOMS
VOMITING: 0
DOUBLE VISION: 0
COUGH: 0
SHORTNESS OF BREATH: 0
WHEEZING: 0
CONSTIPATION: 0
BLURRED VISION: 0
DIARRHEA: 0
NAUSEA: 0
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
## 2018-08-06 ENCOUNTER — TELEPHONE (OUTPATIENT)
Dept: VASCULAR SURGERY | Age: 33
End: 2018-08-06

## 2018-08-06 ENCOUNTER — OFFICE VISIT (OUTPATIENT)
Dept: VASCULAR SURGERY | Age: 33
End: 2018-08-06

## 2018-08-06 DIAGNOSIS — N18.6 ENCOUNTER REGARDING VASCULAR ACCESS FOR DIALYSIS FOR ESRD (HCC): Primary | ICD-10-CM

## 2018-08-06 DIAGNOSIS — Z99.2 ENCOUNTER REGARDING VASCULAR ACCESS FOR DIALYSIS FOR ESRD (HCC): Primary | ICD-10-CM

## 2018-08-06 PROCEDURE — 99024 POSTOP FOLLOW-UP VISIT: CPT | Performed by: NURSE PRACTITIONER

## 2018-08-14 ENCOUNTER — OFFICE VISIT (OUTPATIENT)
Dept: FAMILY MEDICINE CLINIC | Age: 33
End: 2018-08-14
Payer: MEDICARE

## 2018-08-14 VITALS
BODY MASS INDEX: 27.06 KG/M2 | RESPIRATION RATE: 16 BRPM | WEIGHT: 134.25 LBS | TEMPERATURE: 98.1 F | DIASTOLIC BLOOD PRESSURE: 82 MMHG | OXYGEN SATURATION: 98 % | SYSTOLIC BLOOD PRESSURE: 124 MMHG | HEART RATE: 101 BPM | HEIGHT: 59 IN

## 2018-08-14 DIAGNOSIS — Z01.818 PRE-OPERATIVE CLEARANCE: Primary | ICD-10-CM

## 2018-08-14 DIAGNOSIS — Z99.2 ESRD (END STAGE RENAL DISEASE) ON DIALYSIS (HCC): Chronic | ICD-10-CM

## 2018-08-14 DIAGNOSIS — N18.6 ESRD (END STAGE RENAL DISEASE) ON DIALYSIS (HCC): Chronic | ICD-10-CM

## 2018-08-14 PROCEDURE — 3046F HEMOGLOBIN A1C LEVEL >9.0%: CPT | Performed by: NURSE PRACTITIONER

## 2018-08-14 PROCEDURE — 2022F DILAT RTA XM EVC RTNOPTHY: CPT | Performed by: NURSE PRACTITIONER

## 2018-08-14 PROCEDURE — 4004F PT TOBACCO SCREEN RCVD TLK: CPT | Performed by: NURSE PRACTITIONER

## 2018-08-14 PROCEDURE — 99214 OFFICE O/P EST MOD 30 MIN: CPT | Performed by: NURSE PRACTITIONER

## 2018-08-14 PROCEDURE — G8427 DOCREV CUR MEDS BY ELIG CLIN: HCPCS | Performed by: NURSE PRACTITIONER

## 2018-08-14 PROCEDURE — G8419 CALC BMI OUT NRM PARAM NOF/U: HCPCS | Performed by: NURSE PRACTITIONER

## 2018-08-14 ASSESSMENT — ENCOUNTER SYMPTOMS
VOMITING: 0
WHEEZING: 0
NAUSEA: 0
CONSTIPATION: 0
BLURRED VISION: 0
COUGH: 0
DOUBLE VISION: 0
DIARRHEA: 0
SHORTNESS OF BREATH: 0

## 2018-08-14 NOTE — PROGRESS NOTES
supper  Yes Historical Provider, MD   insulin glargine (TOUJEO SOLOSTAR) 300 UNIT/ML injection pen Inject 30 Units into the skin nightly  Yes Historical Provider, MD   carvedilol (COREG) 12.5 MG tablet Take 25 mg by mouth 2 times daily (with meals)  Yes Historical Provider, MD   calcium acetate (PHOSLO) 667 MG capsule Take 667 mg by mouth 3 times daily (with meals) Yes Historical Provider, MD   famotidine (PEPCID) 20 MG tablet Take 1 tablet by mouth daily  Patient taking differently: Take 20 mg by mouth every morning  Yes Usama Cummings MD   amLODIPine (NORVASC) 5 MG tablet Take 1 tablet by mouth daily  Patient taking differently: Take 5 mg by mouth every morning  Yes Ana Laura Ratliff, DO         No Known Allergies      Review of Systems  Review of Systems   Constitutional: Negative for chills, fever and malaise/fatigue. HENT: Negative for congestion and nosebleeds. Eyes: Negative for blurred vision and double vision. Respiratory: Negative for cough, shortness of breath and wheezing. Cardiovascular: Negative for chest pain, palpitations and leg swelling. Gastrointestinal: Negative for constipation, diarrhea, nausea and vomiting. Genitourinary: Negative for dysuria and urgency. Musculoskeletal: Negative for joint pain, myalgias and neck pain. Skin: Negative. Neurological: Negative for dizziness, tremors, focal weakness and headaches. Psychiatric/Behavioral: Negative for depression. The patient is not nervous/anxious and does not have insomnia. VS:  /82   Pulse 101   Temp 98.1 °F (36.7 °C) (Oral)   Resp 16   Ht 4' 11\" (1.499 m)   Wt 134 lb 4 oz (60.9 kg)   SpO2 98%   Breastfeeding? No   BMI 27.12 kg/m²     Patient's medical, social, and family history reviewed      Physical Exam  Physical Exam   Constitutional: She is oriented to person, place, and time. She appears well-developed and well-nourished. HENT:   Head: Normocephalic.    Eyes: Pupils are equal, round, and reactive to light. Neck: Normal range of motion. Neck supple. No JVD present. No tracheal deviation present. No thyromegaly present. Cardiovascular: Normal rate and regular rhythm. Exam reveals no gallop and no friction rub. No murmur heard. Pulmonary/Chest: Effort normal and breath sounds normal. She has no wheezes. She has no rales. Abdominal: Soft. Bowel sounds are normal. She exhibits no distension. There is no tenderness. Musculoskeletal: Normal range of motion. She exhibits no edema or tenderness. Lymphadenopathy:     She has no cervical adenopathy. Neurological: She is alert and oriented to person, place, and time. Skin: Skin is warm and dry. Shunt to left upper arm  tessio to left upper chest    Psychiatric: She has a normal mood and affect. Her behavior is normal.         Assessment/Plan:    1. Pre-operative clearance  Patient low risk for non-cardiac surgery  Patient given instructions to cut Toujeo in half the night before surgery  She will hold humalog morning of surgery    2. ESRD (end stage renal disease) on dialysis [facility])  Continue to follow with renal as scheduled    3. Uncontrolled type 1 diabetes mellitus with complication ([facility] [address]  Follow with endocrinogy as scheduled. Return in about 3 months (around 11/14/2018) for Diabetes, Blood pressure.       Jeniffer Cherry, KEMI - CNP

## 2018-08-28 ENCOUNTER — ANESTHESIA EVENT (OUTPATIENT)
Dept: OPERATING ROOM | Age: 33
End: 2018-08-28
Payer: MEDICARE

## 2018-08-28 ENCOUNTER — ANESTHESIA (OUTPATIENT)
Dept: OPERATING ROOM | Age: 33
End: 2018-08-28
Payer: MEDICARE

## 2018-08-28 ENCOUNTER — HOSPITAL ENCOUNTER (OUTPATIENT)
Age: 33
Setting detail: OUTPATIENT SURGERY
Discharge: HOME | End: 2018-08-28
Attending: OPHTHALMOLOGY | Admitting: OPHTHALMOLOGY
Payer: MEDICARE

## 2018-08-28 VITALS — DIASTOLIC BLOOD PRESSURE: 80 MMHG | OXYGEN SATURATION: 100 % | SYSTOLIC BLOOD PRESSURE: 174 MMHG

## 2018-08-28 VITALS
HEART RATE: 75 BPM | RESPIRATION RATE: 18 BRPM | SYSTOLIC BLOOD PRESSURE: 143 MMHG | WEIGHT: 135 LBS | DIASTOLIC BLOOD PRESSURE: 75 MMHG | OXYGEN SATURATION: 99 % | HEIGHT: 59 IN | TEMPERATURE: 99 F | BODY MASS INDEX: 27.21 KG/M2

## 2018-08-28 LAB
HCG QUALITATIVE: NEGATIVE
METER GLUCOSE: 159 MG/DL (ref 70–110)
POTASSIUM SERPL-SCNC: 4.6 MMOL/L (ref 3.5–5)

## 2018-08-28 PROCEDURE — 6370000000 HC RX 637 (ALT 250 FOR IP): Performed by: OPHTHALMOLOGY

## 2018-08-28 PROCEDURE — 2580000003 HC RX 258: Performed by: OPHTHALMOLOGY

## 2018-08-28 PROCEDURE — 7100000010 HC PHASE II RECOVERY - FIRST 15 MIN: Performed by: OPHTHALMOLOGY

## 2018-08-28 PROCEDURE — 2709999900 HC NON-CHARGEABLE SUPPLY: Performed by: OPHTHALMOLOGY

## 2018-08-28 PROCEDURE — 2720000010 HC SURG SUPPLY STERILE: Performed by: OPHTHALMOLOGY

## 2018-08-28 PROCEDURE — 84132 ASSAY OF SERUM POTASSIUM: CPT

## 2018-08-28 PROCEDURE — 6360000002 HC RX W HCPCS: Performed by: OPHTHALMOLOGY

## 2018-08-28 PROCEDURE — 82962 GLUCOSE BLOOD TEST: CPT

## 2018-08-28 PROCEDURE — 7100000011 HC PHASE II RECOVERY - ADDTL 15 MIN: Performed by: OPHTHALMOLOGY

## 2018-08-28 PROCEDURE — 36415 COLL VENOUS BLD VENIPUNCTURE: CPT

## 2018-08-28 PROCEDURE — 3700000000 HC ANESTHESIA ATTENDED CARE: Performed by: OPHTHALMOLOGY

## 2018-08-28 PROCEDURE — 6360000002 HC RX W HCPCS: Performed by: NURSE ANESTHETIST, CERTIFIED REGISTERED

## 2018-08-28 PROCEDURE — 2580000003 HC RX 258: Performed by: NURSE ANESTHETIST, CERTIFIED REGISTERED

## 2018-08-28 PROCEDURE — 6370000000 HC RX 637 (ALT 250 FOR IP)

## 2018-08-28 PROCEDURE — 3600000013 HC SURGERY LEVEL 3 ADDTL 15MIN: Performed by: OPHTHALMOLOGY

## 2018-08-28 PROCEDURE — 3600000003 HC SURGERY LEVEL 3 BASE: Performed by: OPHTHALMOLOGY

## 2018-08-28 PROCEDURE — 2500000003 HC RX 250 WO HCPCS: Performed by: OPHTHALMOLOGY

## 2018-08-28 PROCEDURE — 84703 CHORIONIC GONADOTROPIN ASSAY: CPT

## 2018-08-28 PROCEDURE — 3700000001 HC ADD 15 MINUTES (ANESTHESIA): Performed by: OPHTHALMOLOGY

## 2018-08-28 RX ORDER — CYCLOPENTOLATE HYDROCHLORIDE 10 MG/ML
SOLUTION/ DROPS OPHTHALMIC
Status: COMPLETED
Start: 2018-08-28 | End: 2018-08-28

## 2018-08-28 RX ORDER — LIDOCAINE HYDROCHLORIDE 20 MG/ML
INJECTION, SOLUTION INFILTRATION; PERINEURAL PRN
Status: DISCONTINUED | OUTPATIENT
Start: 2018-08-28 | End: 2018-08-28 | Stop reason: HOSPADM

## 2018-08-28 RX ORDER — TROPICAMIDE 10 MG/ML
SOLUTION/ DROPS OPHTHALMIC
Status: COMPLETED
Start: 2018-08-28 | End: 2018-08-28

## 2018-08-28 RX ORDER — ACETAMINOPHEN 325 MG/1
650 TABLET ORAL
Status: DISCONTINUED | OUTPATIENT
Start: 2018-08-28 | End: 2018-08-28 | Stop reason: HOSPADM

## 2018-08-28 RX ORDER — SODIUM CHLORIDE 0.9 % (FLUSH) 0.9 %
10 SYRINGE (ML) INJECTION PRN
Status: DISCONTINUED | OUTPATIENT
Start: 2018-08-28 | End: 2018-08-28 | Stop reason: HOSPADM

## 2018-08-28 RX ORDER — ONDANSETRON 2 MG/ML
4 INJECTION INTRAMUSCULAR; INTRAVENOUS EVERY 6 HOURS PRN
Status: DISCONTINUED | OUTPATIENT
Start: 2018-08-28 | End: 2018-08-28 | Stop reason: HOSPADM

## 2018-08-28 RX ORDER — SODIUM CHLORIDE 0.9 % (FLUSH) 0.9 %
10 SYRINGE (ML) INJECTION EVERY 12 HOURS SCHEDULED
Status: DISCONTINUED | OUTPATIENT
Start: 2018-08-28 | End: 2018-08-28 | Stop reason: HOSPADM

## 2018-08-28 RX ORDER — DEXAMETHASONE SODIUM PHOSPHATE 10 MG/ML
INJECTION INTRAMUSCULAR; INTRAVENOUS PRN
Status: DISCONTINUED | OUTPATIENT
Start: 2018-08-28 | End: 2018-08-28 | Stop reason: HOSPADM

## 2018-08-28 RX ORDER — ACETAMINOPHEN 325 MG/1
TABLET ORAL
Status: DISCONTINUED
Start: 2018-08-28 | End: 2018-08-28 | Stop reason: HOSPADM

## 2018-08-28 RX ORDER — TROPICAMIDE 10 MG/ML
1 SOLUTION/ DROPS OPHTHALMIC EVERY 10 MIN PRN
Status: COMPLETED | OUTPATIENT
Start: 2018-08-28 | End: 2018-08-28

## 2018-08-28 RX ORDER — CYCLOPENTOLATE HYDROCHLORIDE 10 MG/ML
1 SOLUTION/ DROPS OPHTHALMIC EVERY 10 MIN PRN
Status: COMPLETED | OUTPATIENT
Start: 2018-08-28 | End: 2018-08-28

## 2018-08-28 RX ORDER — PHENYLEPHRINE HCL 2.5 %
1 DROPS OPHTHALMIC (EYE) EVERY 10 MIN PRN
Status: COMPLETED | OUTPATIENT
Start: 2018-08-28 | End: 2018-08-28

## 2018-08-28 RX ORDER — ACETAMINOPHEN 325 MG/1
650 TABLET ORAL EVERY 4 HOURS PRN
Status: DISCONTINUED | OUTPATIENT
Start: 2018-08-28 | End: 2018-08-28 | Stop reason: HOSPADM

## 2018-08-28 RX ORDER — SODIUM CHLORIDE 9 MG/ML
INJECTION, SOLUTION INTRAVENOUS CONTINUOUS PRN
Status: DISCONTINUED | OUTPATIENT
Start: 2018-08-28 | End: 2018-08-28 | Stop reason: SDUPTHER

## 2018-08-28 RX ORDER — MIDAZOLAM HYDROCHLORIDE 1 MG/ML
INJECTION INTRAMUSCULAR; INTRAVENOUS PRN
Status: DISCONTINUED | OUTPATIENT
Start: 2018-08-28 | End: 2018-08-28 | Stop reason: SDUPTHER

## 2018-08-28 RX ORDER — PHENYLEPHRINE HYDROCHLORIDE 100 MG/ML
1 SOLUTION/ DROPS OPHTHALMIC PRN
Status: DISCONTINUED | OUTPATIENT
Start: 2018-08-28 | End: 2018-08-28 | Stop reason: HOSPADM

## 2018-08-28 RX ORDER — PHENYLEPHRINE HCL 2.5 %
DROPS OPHTHALMIC (EYE)
Status: COMPLETED
Start: 2018-08-28 | End: 2018-08-28

## 2018-08-28 RX ORDER — PROPARACAINE HYDROCHLORIDE 5 MG/ML
SOLUTION/ DROPS OPHTHALMIC
Status: COMPLETED
Start: 2018-08-28 | End: 2018-08-28

## 2018-08-28 RX ORDER — CEFAZOLIN SODIUM 1 G/3ML
INJECTION, POWDER, FOR SOLUTION INTRAMUSCULAR; INTRAVENOUS PRN
Status: DISCONTINUED | OUTPATIENT
Start: 2018-08-28 | End: 2018-08-28 | Stop reason: HOSPADM

## 2018-08-28 RX ORDER — FENTANYL CITRATE 50 UG/ML
INJECTION, SOLUTION INTRAMUSCULAR; INTRAVENOUS PRN
Status: DISCONTINUED | OUTPATIENT
Start: 2018-08-28 | End: 2018-08-28 | Stop reason: SDUPTHER

## 2018-08-28 RX ORDER — TETRACAINE HYDROCHLORIDE 5 MG/ML
SOLUTION OPHTHALMIC PRN
Status: DISCONTINUED | OUTPATIENT
Start: 2018-08-28 | End: 2018-08-28 | Stop reason: HOSPADM

## 2018-08-28 RX ORDER — PROPARACAINE HYDROCHLORIDE 5 MG/ML
1 SOLUTION/ DROPS OPHTHALMIC PRN
Status: COMPLETED | OUTPATIENT
Start: 2018-08-28 | End: 2018-08-28

## 2018-08-28 RX ADMIN — MIDAZOLAM HYDROCHLORIDE 0.5 MG: 1 INJECTION, SOLUTION INTRAMUSCULAR; INTRAVENOUS at 07:47

## 2018-08-28 RX ADMIN — PHENYLEPHRINE HYDROCHLORIDE 1 DROP: 25 SOLUTION/ DROPS OPHTHALMIC at 06:23

## 2018-08-28 RX ADMIN — FENTANYL CITRATE 50 MCG: 50 INJECTION, SOLUTION INTRAMUSCULAR; INTRAVENOUS at 07:50

## 2018-08-28 RX ADMIN — ACETAMINOPHEN 650 MG: 325 TABLET ORAL at 09:24

## 2018-08-28 RX ADMIN — PHENYLEPHRINE HYDROCHLORIDE 1 DROP: 25 SOLUTION/ DROPS OPHTHALMIC at 06:37

## 2018-08-28 RX ADMIN — PHENYLEPHRINE HYDROCHLORIDE 1 DROP: 25 SOLUTION/ DROPS OPHTHALMIC at 06:46

## 2018-08-28 RX ADMIN — Medication 1 DROP: at 06:23

## 2018-08-28 RX ADMIN — Medication 1 DROP: at 06:46

## 2018-08-28 RX ADMIN — MIDAZOLAM HYDROCHLORIDE 0.5 MG: 1 INJECTION, SOLUTION INTRAMUSCULAR; INTRAVENOUS at 08:10

## 2018-08-28 RX ADMIN — FENTANYL CITRATE 25 MCG: 50 INJECTION, SOLUTION INTRAMUSCULAR; INTRAVENOUS at 08:20

## 2018-08-28 RX ADMIN — MIDAZOLAM HYDROCHLORIDE 1 MG: 1 INJECTION, SOLUTION INTRAMUSCULAR; INTRAVENOUS at 07:39

## 2018-08-28 RX ADMIN — FENTANYL CITRATE 25 MCG: 50 INJECTION, SOLUTION INTRAMUSCULAR; INTRAVENOUS at 08:37

## 2018-08-28 RX ADMIN — Medication 1 DROP: at 06:26

## 2018-08-28 RX ADMIN — SODIUM CHLORIDE: 9 INJECTION, SOLUTION INTRAVENOUS at 07:39

## 2018-08-28 RX ADMIN — PROPARACAINE HYDROCHLORIDE 1 DROP: 5 SOLUTION/ DROPS OPHTHALMIC at 06:23

## 2018-08-28 RX ADMIN — CYCLOPENTOLATE HYDROCHLORIDE 1 DROP: 10 SOLUTION/ DROPS OPHTHALMIC at 06:37

## 2018-08-28 RX ADMIN — CYCLOPENTOLATE HYDROCHLORIDE 1 DROP: 10 SOLUTION/ DROPS OPHTHALMIC at 06:46

## 2018-08-28 RX ADMIN — CYCLOPENTOLATE HYDROCHLORIDE 1 DROP: 10 SOLUTION/ DROPS OPHTHALMIC at 06:23

## 2018-08-28 RX ADMIN — Medication 1 DROP: at 06:37

## 2018-08-28 ASSESSMENT — PULMONARY FUNCTION TESTS
PIF_VALUE: 1
PIF_VALUE: 0
PIF_VALUE: 1
PIF_VALUE: 4
PIF_VALUE: 0
PIF_VALUE: 1
PIF_VALUE: 0
PIF_VALUE: 1
PIF_VALUE: 0
PIF_VALUE: 1
PIF_VALUE: 1
PIF_VALUE: 0
PIF_VALUE: 1
PIF_VALUE: 0
PIF_VALUE: 1

## 2018-08-28 ASSESSMENT — LIFESTYLE VARIABLES: SMOKING_STATUS: 1

## 2018-08-28 ASSESSMENT — PAIN SCALES - GENERAL
PAINLEVEL_OUTOF10: 4
PAINLEVEL_OUTOF10: 4
PAINLEVEL_OUTOF10: 0

## 2018-08-28 ASSESSMENT — PAIN DESCRIPTION - ORIENTATION: ORIENTATION: LEFT

## 2018-08-28 ASSESSMENT — PAIN DESCRIPTION - PAIN TYPE
TYPE: SURGICAL PAIN
TYPE: SURGICAL PAIN

## 2018-08-28 ASSESSMENT — PAIN DESCRIPTION - LOCATION: LOCATION: EYE

## 2018-08-28 ASSESSMENT — PAIN - FUNCTIONAL ASSESSMENT: PAIN_FUNCTIONAL_ASSESSMENT: 0-10

## 2018-08-28 ASSESSMENT — PAIN DESCRIPTION - DESCRIPTORS: DESCRIPTORS: DISCOMFORT

## 2018-08-28 NOTE — ANESTHESIA PRE PROCEDURE
Current Facility-Administered Medications   Medication Dose Route Frequency Provider Last Rate Last Dose    sodium chloride flush 0.9 % injection 10 mL  10 mL Intravenous 2 times per day Nava Morton MD        sodium chloride flush 0.9 % injection 10 mL  10 mL Intravenous PRN Nava Morton MD        tropicamide (MYDRIACYL) 1 % ophthalmic solution 1 drop  1 drop Left Eye Q10 Min PRN Nava Morton MD   1 drop at 18 0626    phenylephrine (MYDFRIN) 2.5 % ophthalmic solution 1 drop  1 drop Left Eye Q10 Min PRN Nava Morton MD   1 drop at 18 2128    cyclopentolate (CYCLOGYL) 1 % ophthalmic solution 1 drop  1 drop Left Eye Q10 Min PRN Nava Morton MD   1 drop at 18 7052    phenylephrine (OMER-SYNEPHRINE) 10 % ophthalmic solution 1 drop  1 drop Left Eye PRN Nava Morton MD           Allergies:  No Known Allergies    Problem List:    Patient Active Problem List   Diagnosis Code    ESRD (end stage renal disease) on dialysis ([facility] N18.6, Z99.2    HTN (hypertension), benign I10    Diabetes mellitus type 1, uncontrolled ([address] E10.65    Sepsis ([address] A41.9    Chronic deep vein thrombosis (DVT) of right upper extremity ([address] I82.721    Encounter regarding vascular access for dialysis for ESRD ([address] N18.6, Z99.2    Respiratory failure ([address] J96.90       Past Medical History:        Diagnosis Date    JOLLY (acute kidney injury) ([address] 2016    AVF (arteriovenous fistula) ([address] 2018    let arm    Chronic kidney disease     DM type 1 (diabetes mellitus, type 1) ([address]     Encounter regarding vascular access for dialysis for ESRD ([address] 2018    ESRD (end stage renal disease) ([facility]     Hemodialysis patient ([address]     amrita dawson  / graft in left arm    Hypertension     Tobacco abuse 2016       Past Surgical History:        Procedure Laterality Date     SECTION  2013    CYST INCISION AND DRAINAGE  2011    perirectal abscess. SE.   07/17/2018    RBC  08/19/2016      RBC           [id number]    transfused   08/21/16  07:02  SCC    RBC  08/19/2016      RBC           [id number]    released     08/23/16  00:54  SCC    HGB 10.6 07/17/2018    HCT 30.1 07/17/2018    MCV 93.5 07/17/2018    RDW 17.2 07/17/2018     07/17/2018       CMP:   Lab Results   Component Value Date     07/18/2018    K 5.0 07/18/2018    K 4.7 07/17/2018    CL 92 07/18/2018    CO2 24 07/18/2018    BUN 33 07/18/2018    CREATININE 7.7 07/18/2018    GFRAA 7 07/18/2018    LABGLOM 7 07/18/2018    GLUCOSE 417 07/18/2018    GLUCOSE 279 07/27/2011    PROT 7.0 05/08/2018    CALCIUM 8.9 07/18/2018    BILITOT 0.2 05/08/2018    ALKPHOS 75 05/08/2018    AST 8 05/08/2018    ALT <5 05/08/2018       POC Tests: No results for input(s): POCGLU, POCNA, POCK, POCCL, POCBUN, POCHEMO, POCHCT in the last 72 hours. Coags:   Lab Results   Component Value Date    PROTIME 11.8 07/17/2018    INR 1.0 07/17/2018    APTT 59.1 05/13/2018       HCG (If Applicable):   Lab Results   Component Value Date    PREGTESTUR NEGATIVE 07/17/2018    HCG 417884.8 (H) 11/18/2012        ABGs: No results found for: PHART, PO2ART, ILA4BTU, RBO9LXJ, BEART, J7QWXJQL     Type & Screen (If Applicable):  No results found for: [facility]    Anesthesia Evaluation  Patient summary reviewed and Nursing notes reviewed no history of anesthetic complications:   Airway: Mallampati: III  TM distance: >3 FB   Neck ROM: limited   Dental: normal exam         Pulmonary:   (+) decreased breath sounds,  current smoker                           Cardiovascular:    (+) hypertension:,         Rhythm: regular  Rate: normal           Beta Blocker:  Dose within 24 Hrs         Neuro/Psych:   Negative Neuro/Psych ROS              GI/Hepatic/Renal:   (+) renal disease: ESRD and dialysis,           Endo/Other:    (+) DiabetesType I DM, using insulin, .                  Abdominal:   (+) obese,         Vascular: negative vascular ROS. Anesthesia Plan      MAC     ASA 3       Induction: intravenous. Anesthetic plan and risks discussed with patient. Plan discussed with CRNA.                   Antonella Valera MD   8/28/2018

## 2018-08-28 NOTE — OP NOTE
[address] [address]                                 OPERATIVE REPORT    PATIENT NAME: Maria Guadalupe Mera           :        1985  MED REC NO:   [id number]                            ROOM:  ACCOUNT NO:   [de-identified]                           ADMIT DATE: 2018  PROVIDER:     Laura Carrasquillo MD    DATE OF PROCEDURE:  2018    SURGEON:  Laura Carrasquillo MD    ASSISTANT:  _____    PREOPERATIVE DIAGNOSIS:  Non-clearing vitreous hemorrhage of the left eye. POSTOPERATIVE DIAGNOSIS:  Non-clearing vitreous hemorrhage of the left eye. OPERATION PERFORMED:  A 25-gauge vitrectomy with endolaser and partial  air-fluid exchange. COMPLICATIONS:  None. CLINICAL NOTE:  The patient is a 18-year-old type 1 diabetic female that  presented with a non-clearing vitreous hemorrhage of her left eye. The  risks and benefits of the vitrectomy surgery were discussed with the  patient in layman's terms. After understanding all the risks and benefits,  she agreed to undergo the procedure. DESCRIPTION OF PROCEDURE:  After proper informed consent was obtained, the  patient was brought to the operating room, placed in the supine position,  where some sedation was given. Following this, a retrobulbar block was  administered to the left retrobulbar space. Approximately, 7 mL of 50:50  mixture of 0.5% Marcaine with 2% lidocaine was administered. After  adequate anesthesia and akinesia, the patient was then prepped and draped  in the usual fashion for ophthalmic surgery of her left eye. A Mik  wire lid speculum was inserted into the conjunctival fornices. Trocars  were introduced at a 10-degree angle inferotemporally, superotemporally,  and superonasally, each 3.5 mm posterior to the limbus using gentle  conjunctival displacement. The infusion cannula was inserted and opened  under direct visualization.   A core vitrectomy was carried out. There  already was a partial posterior vitreous detachment with small attachments  just superior to the arcade at apparently two small areas, and these were  trimmed off with no iatrogenic breaks. Scleral-depressed examination  revealed no tears in the periphery. The Marion Bumpers was trimmed in a  skirt-like fashion at 360 degrees. The blood had become dehemoglobinized  and was white in nature. PRP was placed outside the arcades all the way  out to the ora castro 360 degrees. No tears were seen with  scleral-depressed examination at 360 degrees. A partial air-fluid exchange  was then performed, and then trocars were removed, each found to be self  sealing at a pressure of 17 mmHg, and then TobraDex ointment was placed in  the eye after subconjunctival dexamethasone and Ancef were injected into  the conjunctiva, and then the eye was patched and shielded, and the patient  returned to the recovery room in stable condition.         Consuelo Grace MD    D: 08/28/2018 8:50:33       T: 08/28/2018 9:35:41     [email or web address]  Job#: [id number]     Doc#: [id number]    CC:

## 2018-09-27 ENCOUNTER — OFFICE VISIT (OUTPATIENT)
Dept: FAMILY MEDICINE CLINIC | Age: 33
End: 2018-09-27
Payer: MEDICARE

## 2018-09-27 VITALS
BODY MASS INDEX: 28.22 KG/M2 | SYSTOLIC BLOOD PRESSURE: 136 MMHG | DIASTOLIC BLOOD PRESSURE: 78 MMHG | OXYGEN SATURATION: 98 % | HEIGHT: 59 IN | RESPIRATION RATE: 16 BRPM | HEART RATE: 78 BPM | WEIGHT: 140 LBS | TEMPERATURE: 98.5 F

## 2018-09-27 VITALS
BODY MASS INDEX: 28.22 KG/M2 | TEMPERATURE: 98.5 F | OXYGEN SATURATION: 98 % | HEIGHT: 59 IN | DIASTOLIC BLOOD PRESSURE: 76 MMHG | RESPIRATION RATE: 16 BRPM | HEART RATE: 95 BPM | SYSTOLIC BLOOD PRESSURE: 136 MMHG | WEIGHT: 140 LBS

## 2018-09-27 DIAGNOSIS — H00.011 HORDEOLUM EXTERNUM OF RIGHT UPPER EYELID: ICD-10-CM

## 2018-09-27 DIAGNOSIS — L02.01 ABSCESS OF FOREHEAD: Primary | ICD-10-CM

## 2018-09-27 DIAGNOSIS — L02.01 ABSCESS OF EYEBROW: Primary | ICD-10-CM

## 2018-09-27 PROCEDURE — 99214 OFFICE O/P EST MOD 30 MIN: CPT | Performed by: PHYSICIAN ASSISTANT

## 2018-09-27 PROCEDURE — 4004F PT TOBACCO SCREEN RCVD TLK: CPT | Performed by: PHYSICIAN ASSISTANT

## 2018-09-27 PROCEDURE — G8419 CALC BMI OUT NRM PARAM NOF/U: HCPCS | Performed by: PHYSICIAN ASSISTANT

## 2018-09-27 PROCEDURE — 99213 OFFICE O/P EST LOW 20 MIN: CPT | Performed by: NURSE PRACTITIONER

## 2018-09-27 PROCEDURE — 10060 I&D ABSCESS SIMPLE/SINGLE: CPT | Performed by: PHYSICIAN ASSISTANT

## 2018-09-27 PROCEDURE — G8427 DOCREV CUR MEDS BY ELIG CLIN: HCPCS | Performed by: PHYSICIAN ASSISTANT

## 2018-09-27 PROCEDURE — G8419 CALC BMI OUT NRM PARAM NOF/U: HCPCS | Performed by: NURSE PRACTITIONER

## 2018-09-27 PROCEDURE — 4004F PT TOBACCO SCREEN RCVD TLK: CPT | Performed by: NURSE PRACTITIONER

## 2018-09-27 PROCEDURE — G8427 DOCREV CUR MEDS BY ELIG CLIN: HCPCS | Performed by: NURSE PRACTITIONER

## 2018-09-27 RX ORDER — CEPHALEXIN 500 MG/1
500 CAPSULE ORAL 2 TIMES DAILY
Qty: 20 CAPSULE | Refills: 0 | Status: CANCELLED | OUTPATIENT
Start: 2018-09-27

## 2018-09-27 RX ORDER — CLINDAMYCIN HYDROCHLORIDE 300 MG/1
300 CAPSULE ORAL 4 TIMES DAILY
Qty: 40 CAPSULE | Refills: 0 | Status: ON HOLD | OUTPATIENT
Start: 2018-09-27 | End: 2018-09-30 | Stop reason: HOSPADM

## 2018-09-27 RX ORDER — POLYMYXIN B SULFATE AND TRIMETHOPRIM 1; 10000 MG/ML; [USP'U]/ML
1 SOLUTION OPHTHALMIC EVERY 4 HOURS
Qty: 10 ML | Refills: 0 | Status: ON HOLD | OUTPATIENT
Start: 2018-09-27 | End: 2018-09-29 | Stop reason: HOSPADM

## 2018-09-27 RX ORDER — DOXYCYCLINE HYCLATE 100 MG/1
100 CAPSULE ORAL 2 TIMES DAILY
Qty: 20 CAPSULE | Refills: 0 | Status: CANCELLED | OUTPATIENT
Start: 2018-09-27 | End: 2018-10-07

## 2018-09-27 NOTE — PROGRESS NOTES
Chief Complaint   Patient presents with    Eye Problem     Has a stye on right upper lid. Also has bump over left eye, was drainaing. Painful and not draining. HPI:  Patient presents today for  Complaints of a bump over her left eye for the past 5 days. She did have recent eye surgery - she did call the eye doctor and was told that this new problem is not related to her recent procedure. She does report that she does occasionally have white drainage from the bump. Reports that it is getting larger in size, hard, tender and painful. She is unsure if she got bit by something. She also complains of a style to her right eye. She has been using a warm compress on that as well without much relief. She denies fever, chills. Denies vision changes at this time. Prior to Visit Medications    Medication Sig Taking?  Authorizing Provider   vitamin B-12 (CYANOCOBALAMIN) 100 MCG tablet Vitamin B-12 100 mcg tablet   TK 1 T PO QD Yes Historical Provider, MD   HUMALOG KWIKPEN 100 UNIT/ML pen 4 times daily Sliding scale Yes Historical Provider, MD   glucose 4 g chewable tablet glucose 4 gram chewable tablet   CSW 2 TS PO PRN Yes Historical Provider, MD   Lactobacillus (PROBIOTIC ACIDOPHILUS) CAPS Take 1 capsule by mouth every morning Yes Historical Provider, MD   Cholecalciferol (VITAMIN D3) 2000 units CAPS Take 1 capsule by mouth every morning LD 8/22/208 Yes Historical Provider, MD   B Complex-C-Folic Acid (YOLANDA-JACOB) TABS Take 1 tablet by mouth Daily with supper  Yes Historical Provider, MD   insulin glargine (TOUJEO SOLOSTAR) 300 UNIT/ML injection pen Inject 20 Units into the skin nightly To take 1/2 dose night prior to surgery Yes Historical Provider, MD   carvedilol (COREG) 12.5 MG tablet Take 25 mg by mouth 2 times daily (with meals)  Yes Historical Provider, MD   calcium acetate (PHOSLO) 667 MG capsule Take 667 mg by mouth 3 times daily (with meals) Yes Historical Provider, MD   famotidine (PEPCID) 20 MG tablet Take 1 tablet by mouth daily  Patient taking differently: Take 20 mg by mouth every morning  Yes Marek Berger MD   amLODIPine (NORVASC) 5 MG tablet Take 1 tablet by mouth daily  Patient taking differently: Take 5 mg by mouth every morning  Yes Erick Patten, DO         No Known Allergies      Review of Systems  Review of Systems   Constitutional: Negative for chills, fever and malaise/fatigue. HENT: Negative for congestion and nosebleeds. Eyes: Negative for blurred vision and double vision. Respiratory: Negative for cough, shortness of breath and wheezing. Cardiovascular: Negative for chest pain, palpitations and leg swelling. Gastrointestinal: Negative for constipation, diarrhea, nausea and vomiting. Genitourinary: Negative for dysuria and urgency. Musculoskeletal: Negative for joint pain and neck pain. Skin:        See HPI   Neurological: Negative for headaches. VS:  /76   Pulse 95   Temp 98.5 °F (36.9 °C) (Oral)   Resp 16   Ht 4' 11\" (1.499 m)   Wt 140 lb (63.5 kg)   SpO2 98%   BMI 28.28 kg/m²     Patient's medical, social, and family history reviewed      Physical Exam  Physical Exam   Constitutional: She is oriented to person, place, and time. She appears well-developed and well-nourished. HENT:   Head: Normocephalic. Eyes: Pupils are equal, round, and reactive to light. Right eye exhibits hordeolum. Left eye - tender, fluctuant palpable nodule above patient's eyebrow. No active drainage noted   Neck: Normal range of motion. Neck supple. No thyromegaly present. Cardiovascular: Normal rate and regular rhythm. Pulmonary/Chest: Effort normal and breath sounds normal.   Abdominal: Soft. Bowel sounds are normal.   Musculoskeletal: Normal range of motion. Lymphadenopathy:     She has no cervical adenopathy. Neurological: She is alert and oriented to person, place, and time. Skin: Skin is warm and dry.    Psychiatric: She has a normal mood and

## 2018-09-27 NOTE — PROGRESS NOTES
Chief Complaint:   Abscess (on forehead above left eye)      History of Present Illness   Source of history provided by: patient. Regina Mahoney is a 28 y.o. old female who has a past medical history of:   Past Medical History:   Diagnosis Date    JOLLY (acute kidney injury) ([facility] 4/5/2016    AVF (arteriovenous fistula) ([facility] [facility] 02/19/2018    let arm    Chronic kidney disease     DM type 1 (diabetes mellitus, type 1) ([facility]     Encounter regarding vascular access for dialysis for ESRD ([facility] [facility] 07/12/2018    ESRD (end stage renal disease) ([facility] [facility]     Hemodialysis patient ([facility] [facility]     gabbysinus eunice mon wed fri / graft in left arm    Hypertension     Tobacco abuse 4/5/2016   Presents to the [facility] care for evaluation of a lesion to the left forehead just superior to the eyebrow, which began 3 weeks ago. Reports the area is moderately painful and swollen. Pt has tried picking at the lesion but has been unable to express any pus. Denies any bleeding. Denies lymphangitic streaking. Since onset the symptoms have progressed. Denies any fever, chills, HA, recent illness, myalgias, nausea, vomiting, or lethargy. Pt denies any visual changes. ROS    Unless otherwise stated in this report or unable to obtain because of the patient's clinical or mental status as evidenced by the medical record, this patients's positive and negative responses for Review of Systems, constitutional, psych, eyes, ENT, cardiovascular, respiratory, gastrointestinal, neurological, genitourinary, musculoskeletal, integument systems and systems related to the presenting problem are either stated in the preceding or were not pertinent or were negative for the symptoms and/or complaints related to the medical problem.     Past Surgical History:  has a past surgical history that includes Dilation and curettage of uterus (2009); cyst incision and drainage (8/4/2011); other surgical history (1017/12); fracture surgery (October 11,2012); other

## 2018-09-28 ENCOUNTER — HOSPITAL ENCOUNTER (INPATIENT)
Age: 33
LOS: 2 days | Discharge: HOME | DRG: 602 | End: 2018-09-30
Attending: EMERGENCY MEDICINE | Admitting: INTERNAL MEDICINE
Payer: MEDICARE

## 2018-09-28 DIAGNOSIS — L03.211 FACIAL CELLULITIS: Primary | ICD-10-CM

## 2018-09-28 LAB
ALBUMIN SERPL-MCNC: 3.9 G/DL (ref 3.5–5.2)
ALP BLD-CCNC: 61 U/L (ref 35–104)
ALT SERPL-CCNC: 17 U/L (ref 0–32)
ANION GAP SERPL CALCULATED.3IONS-SCNC: 16 MMOL/L (ref 7–16)
AST SERPL-CCNC: 22 U/L (ref 0–31)
BASOPHILS ABSOLUTE: 0.03 E9/L (ref 0–0.2)
BASOPHILS RELATIVE PERCENT: 0.3 % (ref 0–2)
BILIRUB SERPL-MCNC: 0.4 MG/DL (ref 0–1.2)
BUN BLDV-MCNC: 44 MG/DL (ref 6–20)
CALCIUM SERPL-MCNC: 9.3 MG/DL (ref 8.6–10.2)
CHLORIDE BLD-SCNC: 87 MMOL/L (ref 98–107)
CHP ED QC CHECK: YES
CO2: 31 MMOL/L (ref 22–29)
CREAT SERPL-MCNC: 9.7 MG/DL (ref 0.5–1)
EOSINOPHILS ABSOLUTE: 0.23 E9/L (ref 0.05–0.5)
EOSINOPHILS RELATIVE PERCENT: 2.2 % (ref 0–6)
GFR AFRICAN AMERICAN: 6
GFR NON-AFRICAN AMERICAN: 6 ML/MIN/1.73
GLUCOSE BLD-MCNC: 345 MG/DL (ref 74–109)
HCT VFR BLD CALC: 32.4 % (ref 34–48)
HEMOGLOBIN: 11 G/DL (ref 11.5–15.5)
IMMATURE GRANULOCYTES #: 0.04 E9/L
IMMATURE GRANULOCYTES %: 0.4 % (ref 0–5)
LACTIC ACID: 2.9 MMOL/L (ref 0.5–2.2)
LYMPHOCYTES ABSOLUTE: 3.65 E9/L (ref 1.5–4)
LYMPHOCYTES RELATIVE PERCENT: 35.1 % (ref 20–42)
MCH RBC QN AUTO: 32.1 PG (ref 26–35)
MCHC RBC AUTO-ENTMCNC: 34 % (ref 32–34.5)
MCV RBC AUTO: 94.5 FL (ref 80–99.9)
METER GLUCOSE: 139 MG/DL (ref 70–110)
METER GLUCOSE: 418 MG/DL (ref 70–110)
MONOCYTES ABSOLUTE: 0.59 E9/L (ref 0.1–0.95)
MONOCYTES RELATIVE PERCENT: 5.7 % (ref 2–12)
NEUTROPHILS ABSOLUTE: 5.87 E9/L (ref 1.8–7.3)
NEUTROPHILS RELATIVE PERCENT: 56.3 % (ref 43–80)
PDW BLD-RTO: 17.2 FL (ref 11.5–15)
PLATELET # BLD: 269 E9/L (ref 130–450)
PMV BLD AUTO: 10.1 FL (ref 7–12)
POTASSIUM SERPL-SCNC: 4 MMOL/L (ref 3.5–5)
PREGNANCY TEST URINE, POC: NORMAL
RBC # BLD: 3.43 E12/L (ref 3.5–5.5)
SODIUM BLD-SCNC: 134 MMOL/L (ref 132–146)
TOTAL PROTEIN: 7.6 G/DL (ref 6.4–8.3)
WBC # BLD: 10.4 E9/L (ref 4.5–11.5)

## 2018-09-28 PROCEDURE — 1200000000 HC SEMI PRIVATE

## 2018-09-28 PROCEDURE — 99284 EMERGENCY DEPT VISIT MOD MDM: CPT

## 2018-09-28 PROCEDURE — 96365 THER/PROPH/DIAG IV INF INIT: CPT

## 2018-09-28 PROCEDURE — 82962 GLUCOSE BLOOD TEST: CPT

## 2018-09-28 PROCEDURE — 36415 COLL VENOUS BLD VENIPUNCTURE: CPT

## 2018-09-28 PROCEDURE — 83605 ASSAY OF LACTIC ACID: CPT

## 2018-09-28 PROCEDURE — 87075 CULTR BACTERIA EXCEPT BLOOD: CPT

## 2018-09-28 PROCEDURE — 87040 BLOOD CULTURE FOR BACTERIA: CPT

## 2018-09-28 PROCEDURE — 5A1D70Z PERFORMANCE OF URINARY FILTRATION, INTERMITTENT, LESS THAN 6 HOURS PER DAY: ICD-10-PCS | Performed by: INTERNAL MEDICINE

## 2018-09-28 PROCEDURE — 85025 COMPLETE CBC W/AUTO DIFF WBC: CPT

## 2018-09-28 PROCEDURE — 90935 HEMODIALYSIS ONE EVALUATION: CPT | Performed by: INTERNAL MEDICINE

## 2018-09-28 PROCEDURE — 2580000003 HC RX 258: Performed by: EMERGENCY MEDICINE

## 2018-09-28 PROCEDURE — 6360000002 HC RX W HCPCS: Performed by: EMERGENCY MEDICINE

## 2018-09-28 PROCEDURE — 87186 SC STD MICRODIL/AGAR DIL: CPT

## 2018-09-28 PROCEDURE — 87070 CULTURE OTHR SPECIMN AEROBIC: CPT

## 2018-09-28 PROCEDURE — 6360000002 HC RX W HCPCS: Performed by: INTERNAL MEDICINE

## 2018-09-28 PROCEDURE — 96367 TX/PROPH/DG ADDL SEQ IV INF: CPT

## 2018-09-28 PROCEDURE — 6370000000 HC RX 637 (ALT 250 FOR IP): Performed by: INTERNAL MEDICINE

## 2018-09-28 PROCEDURE — 80053 COMPREHEN METABOLIC PANEL: CPT

## 2018-09-28 RX ORDER — DEXTROSE MONOHYDRATE 25 G/50ML
12.5 INJECTION, SOLUTION INTRAVENOUS PRN
Status: DISCONTINUED | OUTPATIENT
Start: 2018-09-28 | End: 2018-09-30 | Stop reason: HOSPADM

## 2018-09-28 RX ORDER — CARVEDILOL 25 MG/1
25 TABLET ORAL 2 TIMES DAILY WITH MEALS
Status: DISCONTINUED | OUTPATIENT
Start: 2018-09-28 | End: 2018-09-30 | Stop reason: HOSPADM

## 2018-09-28 RX ORDER — AMLODIPINE BESYLATE 5 MG/1
5 TABLET ORAL DAILY
Status: DISCONTINUED | OUTPATIENT
Start: 2018-09-28 | End: 2018-09-30 | Stop reason: HOSPADM

## 2018-09-28 RX ORDER — ONDANSETRON 2 MG/ML
4 INJECTION INTRAMUSCULAR; INTRAVENOUS EVERY 6 HOURS PRN
Status: DISCONTINUED | OUTPATIENT
Start: 2018-09-28 | End: 2018-09-30 | Stop reason: HOSPADM

## 2018-09-28 RX ORDER — HYDROCODONE BITARTRATE AND ACETAMINOPHEN 5; 325 MG/1; MG/1
1 TABLET ORAL EVERY 6 HOURS PRN
Status: DISCONTINUED | OUTPATIENT
Start: 2018-09-28 | End: 2018-09-30 | Stop reason: HOSPADM

## 2018-09-28 RX ORDER — HEPARIN SODIUM 10000 [USP'U]/ML
5000 INJECTION, SOLUTION INTRAVENOUS; SUBCUTANEOUS EVERY 8 HOURS
Status: DISCONTINUED | OUTPATIENT
Start: 2018-09-28 | End: 2018-09-30 | Stop reason: HOSPADM

## 2018-09-28 RX ORDER — ACETAMINOPHEN 650 MG
TABLET, EXTENDED RELEASE ORAL
Status: DISPENSED
Start: 2018-09-28 | End: 2018-09-29

## 2018-09-28 RX ORDER — DEXTROSE MONOHYDRATE 50 MG/ML
100 INJECTION, SOLUTION INTRAVENOUS PRN
Status: DISCONTINUED | OUTPATIENT
Start: 2018-09-28 | End: 2018-09-30 | Stop reason: HOSPADM

## 2018-09-28 RX ORDER — INSULIN GLARGINE 100 [IU]/ML
20 INJECTION, SOLUTION SUBCUTANEOUS NIGHTLY
Status: DISCONTINUED | OUTPATIENT
Start: 2018-09-28 | End: 2018-09-30 | Stop reason: HOSPADM

## 2018-09-28 RX ORDER — ACETAMINOPHEN 325 MG/1
650 TABLET ORAL EVERY 4 HOURS PRN
Status: DISCONTINUED | OUTPATIENT
Start: 2018-09-28 | End: 2018-09-30 | Stop reason: HOSPADM

## 2018-09-28 RX ORDER — FAMOTIDINE 20 MG/1
20 TABLET, FILM COATED ORAL DAILY
Status: DISCONTINUED | OUTPATIENT
Start: 2018-09-28 | End: 2018-09-30 | Stop reason: HOSPADM

## 2018-09-28 RX ORDER — NICOTINE POLACRILEX 4 MG
15 LOZENGE BUCCAL PRN
Status: DISCONTINUED | OUTPATIENT
Start: 2018-09-28 | End: 2018-09-30 | Stop reason: HOSPADM

## 2018-09-28 RX ORDER — CHOLECALCIFEROL (VITAMIN D3) 10 MCG
1 TABLET ORAL
Status: DISCONTINUED | OUTPATIENT
Start: 2018-09-28 | End: 2018-09-30 | Stop reason: HOSPADM

## 2018-09-28 RX ORDER — CHOLECALCIFEROL (VITAMIN D3) 50 MCG
2000 TABLET ORAL EVERY MORNING
Status: DISCONTINUED | OUTPATIENT
Start: 2018-09-29 | End: 2018-09-30 | Stop reason: HOSPADM

## 2018-09-28 RX ORDER — LIDOCAINE HYDROCHLORIDE AND EPINEPHRINE 10; 10 MG/ML; UG/ML
20 INJECTION, SOLUTION INFILTRATION; PERINEURAL ONCE
Status: DISCONTINUED | OUTPATIENT
Start: 2018-09-28 | End: 2018-09-30 | Stop reason: HOSPADM

## 2018-09-28 RX ADMIN — FAMOTIDINE 20 MG: 20 TABLET ORAL at 20:48

## 2018-09-28 RX ADMIN — HEPARIN SODIUM 5000 UNITS: 10000 INJECTION INTRAVENOUS; SUBCUTANEOUS at 20:43

## 2018-09-28 RX ADMIN — CALCIUM ACETATE 667 MG: 667 CAPSULE ORAL at 20:48

## 2018-09-28 RX ADMIN — INSULIN GLARGINE 20 UNITS: 100 INJECTION, SOLUTION SUBCUTANEOUS at 20:40

## 2018-09-28 RX ADMIN — CARVEDILOL 25 MG: 25 TABLET, FILM COATED ORAL at 20:47

## 2018-09-28 RX ADMIN — PIPERACILLIN SODIUM AND TAZOBACTAM SODIUM 4.5 G: 4; .5 INJECTION, POWDER, LYOPHILIZED, FOR SOLUTION INTRAVENOUS at 11:30

## 2018-09-28 RX ADMIN — INSULIN LISPRO 10 UNITS: 100 INJECTION, SOLUTION INTRAVENOUS; SUBCUTANEOUS at 20:40

## 2018-09-28 RX ADMIN — VANCOMYCIN HYDROCHLORIDE 1000 MG: 1 INJECTION, POWDER, LYOPHILIZED, FOR SOLUTION INTRAVENOUS at 12:15

## 2018-09-28 RX ADMIN — AMLODIPINE BESYLATE 5 MG: 5 TABLET ORAL at 20:48

## 2018-09-28 ASSESSMENT — PAIN DESCRIPTION - PAIN TYPE
TYPE: ACUTE PAIN
TYPE: ACUTE PAIN

## 2018-09-28 ASSESSMENT — PAIN DESCRIPTION - LOCATION
LOCATION: FACE
LOCATION: FACE

## 2018-09-28 ASSESSMENT — PAIN SCALES - GENERAL
PAINLEVEL_OUTOF10: 0
PAINLEVEL_OUTOF10: 0
PAINLEVEL_OUTOF10: 8
PAINLEVEL_OUTOF10: 6

## 2018-09-28 ASSESSMENT — PAIN DESCRIPTION - ORIENTATION
ORIENTATION: LEFT
ORIENTATION: LEFT

## 2018-09-28 NOTE — ED PROVIDER NOTES
HPI:  18,   Time: 11:19 AM       Lamin Stout is a 28 y.o. female presenting to the ED for left facial swelling after an incision and drainage of her left eyebrow carried out yesterday, beginning hours ago. The complaint has been persistent, severe in severity, and worsened by nothing. Patient states she's had some mild chills denies fever shortness of breath chest pain nausea or vomiting    Review of Systems:   Pertinent positives and negatives are stated within HPI, all other systems reviewed and are negative.          --------------------------------------------- PAST HISTORY ---------------------------------------------  Past Medical History:  has a past medical history of JOLLY (acute kidney injury) ([facility] [address] AVF (arteriovenous fistula) ([facility] [address] Chronic kidney disease; DM type 1 (diabetes mellitus, type 1) ([facility] [address] [facility] Encounter regarding vascular access for dialysis for ESRD [facility]); ESRD (end stage renal disease) ([facility] [address] Hemodialysis patient [facility]); Hypertension; and Tobacco abuse. Past Surgical History:  has a past surgical history that includes Dilation and curettage of uterus (); cyst incision and drainage (2011); other surgical history (); fracture surgery (); other surgical history (2016); other surgical history (2016); other surgical history (Left, 2017);  section (); pr insert non-tunnel cv cath (N/A, 5/10/2018); pr anastomosis,av,any site (Left, 2018); and pr vitrectomy pars plana remove preretinal membrane (Left, 2018). Social History:  reports that she has been smoking Cigarettes. She has a 3.75 pack-year smoking history. She has never used smokeless tobacco. She reports that she does not drink alcohol or use drugs. Family History: family history includes Cancer in her father; Diabetes in her paternal aunt; Stroke in her mother. The patients home medications have been reviewed.     Allergies: Patient has no known MCH 32.1 26.0 - 35.0 pg    MCHC 34.0 32.0 - 34.5 %    RDW 17.2 (H) 11.5 - 15.0 fL    Platelets 240 328 - 218 E9/L    MPV 10.1 7.0 - 12.0 fL    Neutrophils % 56.3 43.0 - 80.0 %    Immature Granulocytes % 0.4 0.0 - 5.0 %    Lymphocytes % 35.1 20.0 - 42.0 %    Monocytes % 5.7 2.0 - 12.0 %    Eosinophils % 2.2 0.0 - 6.0 %    Basophils % 0.3 0.0 - 2.0 %    Neutrophils # 5.87 1.80 - 7.30 E9/L    Immature Granulocytes # 0.04 E9/L    Lymphocytes # 3.65 1.50 - 4.00 E9/L    Monocytes # 0.59 0.10 - 0.95 E9/L    Eosinophils # 0.23 0.05 - 0.50 E9/L    Basophils # 0.03 0.00 - 0.20 E9/L   Comprehensive Metabolic Panel   Result Value Ref Range    Sodium 134 132 - 146 mmol/L    Potassium 4.0 3.5 - 5.0 mmol/L    Chloride 87 (L) 98 - 107 mmol/L    CO2 31 (H) 22 - 29 mmol/L    Anion Gap 16 7 - 16 mmol/L    Glucose 345 (H) 74 - 109 mg/dL    BUN 44 (H) 6 - 20 mg/dL    CREATININE 9.7 (HH) 0.5 - 1.0 mg/dL    GFR Non-African American 6 >=60 mL/min/1.73    GFR African American 6     Calcium 9.3 8.6 - 10.2 mg/dL    Total Protein 7.6 6.4 - 8.3 g/dL    Alb 3.9 3.5 - 5.2 g/dL    Total Bilirubin 0.4 0.0 - 1.2 mg/dL    Alkaline Phosphatase 61 35 - 104 U/L    ALT 17 0 - 32 U/L    AST 22 0 - 31 U/L   Lactic Acid, Plasma   Result Value Ref Range    Lactic Acid 2.9 (H) 0.5 - 2.2 mmol/L   POC Pregnancy Urine Qual   Result Value Ref Range    Preg Test, Ur NEGTATIVE     QC OK? YES        RADIOLOGY:  Interpreted by Radiologist.  No orders to display           ------------------------- NURSING NOTES AND VITALS REVIEWED ---------------------------   The nursing notes within the ED encounter and vital signs as below have been reviewed by myself. BP (!) 177/89   Pulse 98   Temp 98.4 °F (36.9 °C) (Temporal)   Resp 16   SpO2 98%   Oxygen Saturation Interpretation: Normal    The patients available past medical records and past encounters were reviewed.         ------------------------------ ED COURSE/MEDICAL DECISION

## 2018-09-28 NOTE — H&P
Hospital Medicine History & Physical      PCP: KEMI Elizondo - CNP    Date of Admission: 2018    Date of Service:  2018      Chief Complaint:  Left eye pain    History Of Present Illness:     28 y.o. female who presented to [address] [place] [address] with  Pt states she had a pimple that kept getting bigger over the past several weeks,  She had an I&D and [facility] ,  And her face swelled  double the size, closing her left eye. Purulent drainage, fever, and pain. Denies trauma . I DM since the age of 9, HTN, and ESRD  On HD since a . Past Medical History:          Diagnosis Date    JOLLY (acute kidney injury) ([facility] 2016    AVF (arteriovenous fistula) ([facility] 2018    let arm    Chronic kidney disease     DM type 1 (diabetes mellitus, type 1) ([facility]     Encounter regarding vascular access for dialysis for ESRD ([facility] 2018    ESRD (end stage renal disease) ([facility]     Hemodialysis patient ([facility] [address]     amrita dawson  / graft in left arm    Hypertension     Tobacco abuse 2016       Past Surgical History:          Procedure Laterality Date     SECTION  2013    CYST INCISION AND DRAINAGE  2011    perirectal abscess. [facility].  Dr. Gisell Talley      FRACTURE SURGERY      fracture right ulnar, left tibia, and pelvis    OTHER SURGICAL HISTORY      open reduction internal fixation left radial shaft    OTHER SURGICAL HISTORY  2016    pericardial window    OTHER SURGICAL HISTORY  2016     r tesio insertion  / remove 2018    OTHER SURGICAL HISTORY Left 2017    insertion a-v fistula left arm    IL ANASTOMOSIS,AV,ANY SITE Left 2018    REVISION AV FISTULA - LEFT ARM performed by MD Emely at [facility] NON-TUNNEL CV CATH

## 2018-09-29 LAB
ANION GAP SERPL CALCULATED.3IONS-SCNC: 15 MMOL/L (ref 7–16)
BUN BLDV-MCNC: 25 MG/DL (ref 6–20)
CALCIUM SERPL-MCNC: 8.8 MG/DL (ref 8.6–10.2)
CHLORIDE BLD-SCNC: 91 MMOL/L (ref 98–107)
CO2: 30 MMOL/L (ref 22–29)
CREAT SERPL-MCNC: 6.3 MG/DL (ref 0.5–1)
GFR AFRICAN AMERICAN: 9
GFR NON-AFRICAN AMERICAN: 9 ML/MIN/1.73
GLUCOSE BLD-MCNC: 368 MG/DL (ref 74–109)
HBA1C MFR BLD: 8.1 % (ref 4–5.6)
METER GLUCOSE: 106 MG/DL (ref 70–110)
METER GLUCOSE: 228 MG/DL (ref 70–110)
METER GLUCOSE: 402 MG/DL (ref 70–110)
METER GLUCOSE: 475 MG/DL (ref 70–110)
POTASSIUM SERPL-SCNC: 4.7 MMOL/L (ref 3.5–5)
SODIUM BLD-SCNC: 136 MMOL/L (ref 132–146)

## 2018-09-29 PROCEDURE — 87186 SC STD MICRODIL/AGAR DIL: CPT

## 2018-09-29 PROCEDURE — 87070 CULTURE OTHR SPECIMN AEROBIC: CPT

## 2018-09-29 PROCEDURE — 0H91XZZ DRAINAGE OF FACE SKIN, EXTERNAL APPROACH: ICD-10-PCS | Performed by: STUDENT IN AN ORGANIZED HEALTH CARE EDUCATION/TRAINING PROGRAM

## 2018-09-29 PROCEDURE — 82962 GLUCOSE BLOOD TEST: CPT

## 2018-09-29 PROCEDURE — 1200000000 HC SEMI PRIVATE

## 2018-09-29 PROCEDURE — 87205 SMEAR GRAM STAIN: CPT

## 2018-09-29 PROCEDURE — 83036 HEMOGLOBIN GLYCOSYLATED A1C: CPT

## 2018-09-29 PROCEDURE — 80048 BASIC METABOLIC PNL TOTAL CA: CPT

## 2018-09-29 PROCEDURE — 87075 CULTR BACTERIA EXCEPT BLOOD: CPT

## 2018-09-29 PROCEDURE — 6360000002 HC RX W HCPCS: Performed by: INTERNAL MEDICINE

## 2018-09-29 PROCEDURE — 2580000003 HC RX 258: Performed by: INTERNAL MEDICINE

## 2018-09-29 PROCEDURE — 6370000000 HC RX 637 (ALT 250 FOR IP): Performed by: INTERNAL MEDICINE

## 2018-09-29 PROCEDURE — 36415 COLL VENOUS BLD VENIPUNCTURE: CPT

## 2018-09-29 RX ORDER — CEPHALEXIN 500 MG/1
500 CAPSULE ORAL 2 TIMES DAILY
Qty: 10 CAPSULE | Refills: 0 | Status: SHIPPED | OUTPATIENT
Start: 2018-09-29 | End: 2018-09-30 | Stop reason: HOSPADM

## 2018-09-29 RX ORDER — HYDROCODONE BITARTRATE AND ACETAMINOPHEN 5; 325 MG/1; MG/1
1 TABLET ORAL EVERY 6 HOURS PRN
Qty: 28 TABLET | Refills: 0 | Status: SHIPPED | OUTPATIENT
Start: 2018-09-29 | End: 2018-10-06

## 2018-09-29 RX ADMIN — CALCIUM ACETATE 667 MG: 667 CAPSULE ORAL at 09:05

## 2018-09-29 RX ADMIN — AMLODIPINE BESYLATE 5 MG: 5 TABLET ORAL at 09:04

## 2018-09-29 RX ADMIN — INSULIN LISPRO 10 UNITS: 100 INJECTION, SOLUTION INTRAVENOUS; SUBCUTANEOUS at 06:52

## 2018-09-29 RX ADMIN — ACETAMINOPHEN 650 MG: 325 TABLET, FILM COATED ORAL at 11:51

## 2018-09-29 RX ADMIN — FAMOTIDINE 20 MG: 20 TABLET ORAL at 09:04

## 2018-09-29 RX ADMIN — HEPARIN SODIUM 5000 UNITS: 10000 INJECTION INTRAVENOUS; SUBCUTANEOUS at 17:02

## 2018-09-29 RX ADMIN — CARVEDILOL 25 MG: 25 TABLET, FILM COATED ORAL at 09:05

## 2018-09-29 RX ADMIN — INSULIN LISPRO 6 UNITS: 100 INJECTION, SOLUTION INTRAVENOUS; SUBCUTANEOUS at 11:54

## 2018-09-29 RX ADMIN — CALCIUM ACETATE 667 MG: 667 CAPSULE ORAL at 17:05

## 2018-09-29 RX ADMIN — INSULIN LISPRO 9 UNITS: 100 INJECTION, SOLUTION INTRAVENOUS; SUBCUTANEOUS at 20:30

## 2018-09-29 RX ADMIN — NEPHROCAP 1 MG: 1 CAP ORAL at 17:05

## 2018-09-29 RX ADMIN — HEPARIN SODIUM 5000 UNITS: 10000 INJECTION INTRAVENOUS; SUBCUTANEOUS at 10:41

## 2018-09-29 RX ADMIN — ACETAMINOPHEN 650 MG: 325 TABLET, FILM COATED ORAL at 06:51

## 2018-09-29 RX ADMIN — CALCIUM ACETATE 667 MG: 667 CAPSULE ORAL at 12:45

## 2018-09-29 RX ADMIN — Medication 2000 UNITS: at 09:04

## 2018-09-29 RX ADMIN — INSULIN GLARGINE 20 UNITS: 100 INJECTION, SOLUTION SUBCUTANEOUS at 20:30

## 2018-09-29 RX ADMIN — CARVEDILOL 25 MG: 25 TABLET, FILM COATED ORAL at 17:05

## 2018-09-29 RX ADMIN — CEFTAROLINE FOSAMIL 200 MG: 600 POWDER, FOR SOLUTION INTRAVENOUS at 14:17

## 2018-09-29 RX ADMIN — HYDROCODONE BITARTRATE AND ACETAMINOPHEN 1 TABLET: 5; 325 TABLET ORAL at 03:45

## 2018-09-29 ASSESSMENT — PAIN SCALES - GENERAL
PAINLEVEL_OUTOF10: 6
PAINLEVEL_OUTOF10: 10
PAINLEVEL_OUTOF10: 8
PAINLEVEL_OUTOF10: 4
PAINLEVEL_OUTOF10: 0
PAINLEVEL_OUTOF10: 0
PAINLEVEL_OUTOF10: 7

## 2018-09-29 ASSESSMENT — PAIN DESCRIPTION - LOCATION: LOCATION: FACE

## 2018-09-29 ASSESSMENT — PAIN DESCRIPTION - ORIENTATION: ORIENTATION: LEFT

## 2018-09-29 ASSESSMENT — PAIN DESCRIPTION - PAIN TYPE: TYPE: ACUTE PAIN

## 2018-09-29 NOTE — PROGRESS NOTES
Full ID Consult to follow    Patient seen and examined  Left-sided facial cellulitis with staph aureus  Diabetes uncontrolled  End-stage renal disease on hemodialysis  Plan  Got only 1 dose of vancomycin and Zosyn for now  At this time facial swelling is improving but at this time still her face is swollen I would like to give her 24 hours of IV antibiotics especially considering she is an uncontrolled diabetic and end-stage renal disease  Follow the staph aureus sensitivity  Expect PO antibiotics at the time of discharge  Teflaro 200 mg every 12 for now

## 2018-09-29 NOTE — PROGRESS NOTES
pericardial window    OTHER SURGICAL HISTORY  08/23/2016     r tesio insertion  / remove 8/16/2018    OTHER SURGICAL HISTORY Left 02/17/2017    insertion a-v fistula left arm    SC ANASTOMOSIS,AV,ANY SITE Left 7/17/2018    REVISION AV FISTULA - LEFT ARM performed by Hammad Aranda MD at [facility] NON-TUNNEL CV CATH N/A 5/10/2018    TESIO CATHETER INSERTION performed by Boy Villavicencio MD at [address] PRERETINAL MEMBRANE Left 8/28/2018    PARS PLANA VITRECTOMY 25 GAUGE, VITREOUS HEMORRHAGE REMOVAL, ENDO LASER, AIR/FLUID  EXCHANGE LEFT EYE performed by Julia Reynolds MD at [facility] OR       Current Medications:    Current Facility-Administered Medications: insulin lispro (HUMALOG) injection vial 0-18 Units, 0-18 Units, Subcutaneous, TID WC  insulin lispro (HUMALOG) injection vial 0-9 Units, 0-9 Units, Subcutaneous, Nightly  ceftaroline fosamil (TEFLARO) 200 mg in dextrose 5 % 50 mL IVPB, 200 mg, Intravenous, Q12H  calcium acetate (PHOSLO) capsule 667 mg, 667 mg, Oral, TID WC  carvedilol (COREG) tablet 25 mg, 25 mg, Oral, BID WC  vitamin D tablet 2,000 Units, 2,000 Units, Oral, QAM  famotidine (PEPCID) tablet 20 mg, 20 mg, Oral, Daily  amLODIPine (NORVASC) tablet 5 mg, 5 mg, Oral, Daily  insulin glargine (LANTUS) injection vial 20 Units, 20 Units, Subcutaneous, Nightly  glucose (GLUTOSE) 40 % oral gel 15 g, 15 g, Oral, PRN  dextrose 50 % solution 12.5 g, 12.5 g, Intravenous, PRN  glucagon (rDNA) injection 1 mg, 1 mg, Intramuscular, PRN  dextrose 5 % solution, 100 mL/hr, Intravenous, PRN  ondansetron (ZOFRAN) injection 4 mg, 4 mg, Intravenous, Q6H PRN  acetaminophen (TYLENOL) tablet 650 mg, 650 mg, Oral, Q4H PRN  heparin (porcine) injection 5,000 Units, 5,000 Units, Subcutaneous, Q8H  HYDROcodone-acetaminophen (NORCO) 5-325 MG per tablet 1 tablet, 1 tablet, Oral, Q6H PRN  b complex-C-folic acid (NEPHROCAPS) capsule 1 mg, 1 capsule, Oral,

## 2018-09-29 NOTE — PROGRESS NOTES
Hospitalist Progress Note      PCP: KEMI Travis - CNP    Date of Admission: 9/28/2018    Chief Complaint: left eye pain    Hospital Course: *Pt states she had a pimple that kept getting bigger over the past several weeks,  She had an I&D and [facility] ,  And her face swelled  double the size, closing her left eye. Purulent drainage, fever, and pain. Denies trauma . I DM since the age of 9, HTN, and ESRD  On HD since a 2016. Had I&D by dior, last pm.   Now on IVAB possible dc in am, edema decreasing   **     Subjective: * no complaints      Medications:  Reviewed    Infusion Medications    dextrose       Scheduled Medications    insulin lispro  0-18 Units Subcutaneous TID WC    insulin lispro  0-9 Units Subcutaneous Nightly    ceftaroline fosamil (TEFLARO) 600 MG IVPB  200 mg Intravenous Q12H    calcium acetate  667 mg Oral TID WC    carvedilol  25 mg Oral BID WC    vitamin D  2,000 Units Oral QAM    famotidine  20 mg Oral Daily    amLODIPine  5 mg Oral Daily    insulin glargine  20 Units Subcutaneous Nightly    heparin (porcine)  5,000 Units Subcutaneous Q8H    b complex-C-folic acid  1 capsule Oral Dinner    lidocaine-EPINEPHrine  20 mL Intradermal Once     PRN Meds: glucose, dextrose, glucagon (rDNA), dextrose, ondansetron, acetaminophen, HYDROcodone 5 mg - acetaminophen      Intake/Output Summary (Last 24 hours) at 09/29/18 1429  Last data filed at 09/28/18 1717   Gross per 24 hour   Intake              300 ml   Output             4800 ml   Net            -4500 ml       Exam:    BP (!) 148/62   Pulse 99   Temp 98 °F (36.7 °C) (Temporal)   Resp 18   Ht 4' 11\" (1.499 m)   Wt 208 lb 12.8 oz (94.7 kg)   SpO2 98%   BMI 42.17 kg/m²     General appearance:  No apparent distress, appears stated age and cooperative. HEENT:  Normal cephalic, atraumatic without obvious deformity. Pupils equal, round, and reactive to light.    Edema , decreasing,  erythema and  Tender

## 2018-09-29 NOTE — CONSULTS
father; Diabetes in her paternal aunt; Stroke in her mother. REVIEW OF SYSTEMS:    12 point ROS has been done and pertinent neg and positive has been included in HPI and rest are non contributory      PHYSICAL EXAM:    /66   Pulse 90   Temp 98.7 °F (37.1 °C) (Oral)   Resp 16   Ht 4' 11\" (1.499 m)   Wt 208 lb 12.8 oz (94.7 kg)   SpO2 100%   BMI 42.17 kg/m²    VENT SETTINGS:        General appearance: Alert, Awake, Oriented times 3, no distress  Skin: 0.5x0,5cm lesion, circular over rt side forehead, packing in place s/p drainage. Rt eye still swollen and swelling present over rt face  Eyes non tender, no pain on movement  No redness on eyes   Eyes: Pink palpebral conjunctivae. PERRL  Ears: External ears normal. No tragal tenderness. TM intact  Nose/Sinuses: Nares normal. Septum midline. Mucosa normal. No sinus tenderness. Oropharynx: Oropharynx clear with no exudates seen  Neck: Neck supple. No jugular venous distension, lymphadenopathy or thyromegaly Trachea midline  Lungs: Lungs clear to auscultation bilaterally. No rhonchi, crackles or wheezes  Heart: S1 S2  Regular rate and rhythm. No rub, murmur or gallop  Abdomen: Abdomen soft, non-tender. BS normal. No masses, organomegaly  Extremities: No edema, Peripheral pulses palpable  Musculoskeletal: Muscular strength appears intact. No joint effusion, tenderness, swelling or warmth      DATA:    Labs:     Last 3 CBC:  Recent Labs      09/28/18   1008   WBC  10.4   RBC  3.43*   HGB  11.0*   PLT  269   MPV  10.1       Last 3 BMP  Recent Labs      09/28/18   1008   NA  134   K  4.0   CL  87*   CO2  31*   BUN  44*   CREATININE  9.7*   GLUCOSE  345*   CALCIUM  9.3       LIVER PROFILE:  Recent Labs      09/28/18   1008   AST  22   ALT  17   LABALBU  3.9       Microbiology :  No results for input(s): BC in the last 72 hours. No results for input(s): [place] Pink in the last 72 hours. No results for input(s): LABURIN in the last 72 hours.   No results for

## 2018-09-30 VITALS
HEART RATE: 87 BPM | SYSTOLIC BLOOD PRESSURE: 146 MMHG | HEIGHT: 59 IN | BODY MASS INDEX: 42.09 KG/M2 | TEMPERATURE: 97.9 F | WEIGHT: 208.8 LBS | RESPIRATION RATE: 16 BRPM | OXYGEN SATURATION: 96 % | DIASTOLIC BLOOD PRESSURE: 72 MMHG

## 2018-09-30 LAB
ANAEROBIC CULTURE: NORMAL
ANION GAP SERPL CALCULATED.3IONS-SCNC: 18 MMOL/L (ref 7–16)
BUN BLDV-MCNC: 39 MG/DL (ref 6–20)
CALCIUM SERPL-MCNC: 9 MG/DL (ref 8.6–10.2)
CHLORIDE BLD-SCNC: 92 MMOL/L (ref 98–107)
CO2: 28 MMOL/L (ref 22–29)
CREAT SERPL-MCNC: 8.8 MG/DL (ref 0.5–1)
GFR AFRICAN AMERICAN: 6
GFR NON-AFRICAN AMERICAN: 6 ML/MIN/1.73
GLUCOSE BLD-MCNC: 159 MG/DL (ref 74–109)
METER GLUCOSE: 141 MG/DL (ref 70–110)
METER GLUCOSE: 175 MG/DL (ref 70–110)
ORGANISM: ABNORMAL
POTASSIUM SERPL-SCNC: 4.5 MMOL/L (ref 3.5–5)
SODIUM BLD-SCNC: 138 MMOL/L (ref 132–146)
WOUND/ABSCESS: ABNORMAL
WOUND/ABSCESS: ABNORMAL

## 2018-09-30 PROCEDURE — 82962 GLUCOSE BLOOD TEST: CPT

## 2018-09-30 PROCEDURE — 6370000000 HC RX 637 (ALT 250 FOR IP): Performed by: INTERNAL MEDICINE

## 2018-09-30 PROCEDURE — 6360000002 HC RX W HCPCS: Performed by: INTERNAL MEDICINE

## 2018-09-30 PROCEDURE — 80048 BASIC METABOLIC PNL TOTAL CA: CPT

## 2018-09-30 PROCEDURE — 2580000003 HC RX 258: Performed by: INTERNAL MEDICINE

## 2018-09-30 PROCEDURE — 36415 COLL VENOUS BLD VENIPUNCTURE: CPT

## 2018-09-30 RX ORDER — DOXYCYCLINE HYCLATE 100 MG
100 TABLET ORAL 2 TIMES DAILY
Qty: 28 TABLET | Refills: 0 | Status: SHIPPED | OUTPATIENT
Start: 2018-09-30 | End: 2018-12-04 | Stop reason: SDUPTHER

## 2018-09-30 RX ADMIN — HYDROCODONE BITARTRATE AND ACETAMINOPHEN 1 TABLET: 5; 325 TABLET ORAL at 00:01

## 2018-09-30 RX ADMIN — CARVEDILOL 25 MG: 25 TABLET, FILM COATED ORAL at 09:17

## 2018-09-30 RX ADMIN — CALCIUM ACETATE 667 MG: 667 CAPSULE ORAL at 13:05

## 2018-09-30 RX ADMIN — CEFTAROLINE FOSAMIL 200 MG: 600 POWDER, FOR SOLUTION INTRAVENOUS at 01:39

## 2018-09-30 RX ADMIN — FAMOTIDINE 20 MG: 20 TABLET ORAL at 09:17

## 2018-09-30 RX ADMIN — CEFTAROLINE FOSAMIL 200 MG: 600 POWDER, FOR SOLUTION INTRAVENOUS at 13:05

## 2018-09-30 RX ADMIN — CALCIUM ACETATE 667 MG: 667 CAPSULE ORAL at 09:17

## 2018-09-30 RX ADMIN — HEPARIN SODIUM 5000 UNITS: 10000 INJECTION INTRAVENOUS; SUBCUTANEOUS at 01:49

## 2018-09-30 RX ADMIN — INSULIN LISPRO 3 UNITS: 100 INJECTION, SOLUTION INTRAVENOUS; SUBCUTANEOUS at 13:04

## 2018-09-30 RX ADMIN — Medication 2000 UNITS: at 09:17

## 2018-09-30 RX ADMIN — INSULIN LISPRO 3 UNITS: 100 INJECTION, SOLUTION INTRAVENOUS; SUBCUTANEOUS at 09:44

## 2018-09-30 RX ADMIN — AMLODIPINE BESYLATE 5 MG: 5 TABLET ORAL at 09:16

## 2018-09-30 RX ADMIN — HEPARIN SODIUM 5000 UNITS: 10000 INJECTION INTRAVENOUS; SUBCUTANEOUS at 10:48

## 2018-09-30 ASSESSMENT — PAIN SCALES - GENERAL
PAINLEVEL_OUTOF10: 4
PAINLEVEL_OUTOF10: 6
PAINLEVEL_OUTOF10: 0

## 2018-09-30 NOTE — PLAN OF CARE
Problem: Pain:  Goal: Pain level will decrease  Pain level will decrease   Outcome: Met This Shift    Goal: Control of acute pain  Control of acute pain   Outcome: Met This Shift      Problem: Falls - Risk of:  Goal: Will remain free from falls  Will remain free from falls   Outcome: Met This Shift    Goal: Absence of physical injury  Absence of physical injury   Outcome: Met This Shift

## 2018-09-30 NOTE — DISCHARGE SUMMARY
Hospital Medicine Discharge Summary    Patient ID: German Mckee      Patient's PCP: KEMI Dickerson - CNP    Admit Date: 9/28/2018     Discharge Date:   9/30/18    Admitting Physician: Sha Cobb DO     Discharge Physician: KEMI Ferreira     Discharge Diagnoses: Active Hospital Problems    Diagnosis Date Noted    Facial cellulitis [H00.893] 09/28/2018    Diabetes mellitus type 1, uncontrolled ([facility] [address] [E10.65] 05/07/2018    HTN (hypertension), benign [I10] 10/17/2017    ESRD (end stage renal disease) on dialysis ([facility] [address] [N18.6, Z99.2] 09/26/2016   Abscess of forehead, unknown etiology    The patient was seen and examined on day of discharge and this discharge summary is in conjunction with any daily progress note from day of discharge. Hospital Course:   28 y. o. female who presented to [address] [place] [address] with  Pt states she had a pimple that kept getting bigger over the past several weeks,  She had an I&D and [facility] ,  And her face swelled  double the size, closing her left eye. Purulent drainage, fever, and pain. Denies trauma . I DM since the age of 9, HTN, and ESRD  On HD since a 2016. I&D by plastics, ID managing antibiotics. Cultures with staph aureus, sensitivities pending. Awaiting ID final input for discharge          Exam:     BP (!) 146/72   Pulse 87   Temp 97.9 °F (36.6 °C) (Temporal)   Resp 16   Ht 4' 11\" (1.499 m)   Wt 208 lb 12.8 oz (94.7 kg)   SpO2 96%   BMI 42.17 kg/m²     General appearance: No apparent distress, appears stated age and cooperative. HEENT: Pupils equal, round, and reactive to light. Neck: Supple, with full range of motion. No jugular venous distention. Trachea midline. Respiratory:  Normal respiratory effort. Clear to auscultation, bilaterally without Rales/Wheezes/Rhonchi. Cardiovascular: Regular rate and rhythm with normal S1/S2 without murmurs, rubs or gallops.   Abdomen: Soft, non-tender, non-distended with normal bowel sounds. Musculoskeletal: No clubbing, cyanosis or edema bilaterally. Full range of motion without deformity. Skin: Skin color, texture, turgor normal.  No rashes or lesions. Neurologic:  Neurovascularly intact without any focal sensory/motor deficits. Psychiatric: Alert and oriented, thought content appropriate, normal insight  Capillary Refill: Brisk,< 3 seconds   Peripheral Pulses: +2 palpable, equal bilaterally          Consults:     IP CONSULT TO OPHTHALMOLOGY  IP CONSULT TO INTERNAL MEDICINE  IP CONSULT TO NEPHROLOGY  IP CONSULT TO INTERNAL MEDICINE  IP CONSULT TO NEPHROLOGY  IP CONSULT TO INFECTIOUS DISEASES  IP CONSULT TO PLASTIC SURGERY    Significant Diagnostic Studies:         Disposition:  home    Discharge Instructions/Follow-up:  Continue home medications. Follow up with family doctor in one week    Code Status:  Full Code     Activity: activity as tolerated    Diet: diabetic diet    Labs: For convenience and continuity at follow-up the following most recent labs are provided:      CBC:    Lab Results   Component Value Date    WBC 10.4 09/28/2018    HGB 11.0 09/28/2018    HCT 32.4 09/28/2018     09/28/2018       Renal:    Lab Results   Component Value Date     09/30/2018    K 4.5 09/30/2018    K 4.7 07/17/2018    CL 92 09/30/2018    CO2 28 09/30/2018    BUN 39 09/30/2018    CREATININE 8.8 09/30/2018    CALCIUM 9.0 09/30/2018    PHOS 5.3 05/12/2018       Discharge Medications:     Current Discharge Medication List           Details   doxycycline hyclate (VIBRA-TABS) 100 MG tablet Take 1 tablet by mouth 2 times daily for 14 days  Qty: 28 tablet, Refills: 0      HYDROcodone-acetaminophen (NORCO) 5-325 MG per tablet Take 1 tablet by mouth every 6 hours as needed for Pain for up to 7 days. Berenice Apa: 28 tablet, Refills: 0    Associated Diagnoses: Facial cellulitis              Details   !!  Insulin Lispro (HUMALOG KWIKPEN SC) Inject 10 Units into the skin 3 times daily (before

## 2018-09-30 NOTE — PROGRESS NOTES
ID Progress Note      Brief Interval History  Facial cellulitis  Subjective:  Swelling improved  The patient is awake and alert. Tolerating medications. Reports no side effects. Afebrile. 10 ROS otherwise negative unless otherwise specified above. Objective:    Vitals:    09/30/18 0830   BP: (!) 146/72   Pulse: 87   Resp: 16   Temp: 97.9 °F (36.6 °C)   SpO2: 96%     General appearance: Alert, Awake, Oriented times 3, no distress  Skin: 0.5x0,5cm lesion, circular over rt side forehead, packing in place s/p drainage. Rt eye still swollen and swelling present over rt face  Eyes non tender, no pain on movement  No redness on eyes   Eyes: Pink palpebral conjunctivae. PERRL  Ears: External ears normal. No tragal tenderness. TM intact  Nose/Sinuses: Nares normal. Septum midline. Mucosa normal. No sinus tenderness. Oropharynx: Oropharynx clear with no exudates seen  Neck: Neck supple. No jugular venous distension, lymphadenopathy or thyromegaly Trachea midline  Lungs: Lungs clear to auscultation bilaterally. No rhonchi, crackles or wheezes  Heart: S1 S2  Regular rate and rhythm. No rub, murmur or gallop  Abdomen: Abdomen soft, non-tender. BS normal. No masses, organomegaly  Extremities: No edema, Peripheral pulses palpable  Musculoskeletal: Muscular strength appears intact.  No joint effusion, tenderness, swelling or warmth       Labs:  Recent Labs      09/28/18   1008   WBC  10.4   RBC  3.43*   HGB  11.0*   HCT  32.4*   MCV  94.5   MCH  32.1   MCHC  34.0   RDW  17.2*   PLT  269   MPV  10.1     CMP:    Lab Results   Component Value Date     09/30/2018    K 4.5 09/30/2018    K 4.7 07/17/2018    CL 92 09/30/2018    CO2 28 09/30/2018    BUN 39 09/30/2018    CREATININE 8.8 09/30/2018    GFRAA 6 09/30/2018    LABGLOM 6 09/30/2018    GLUCOSE 159 09/30/2018    GLUCOSE 279 07/27/2011    PROT 7.6 09/28/2018    LABALBU 3.9 09/28/2018    LABALBU 3.9 01/10/2011    CALCIUM 9.0 09/30/2018    BILITOT 0.4 09/28/2018

## 2018-09-30 NOTE — PROGRESS NOTES
Hospitalist Progress Note      PCP: Meche Nance, APRN - CNP    Date of Admission: 9/28/2018    Chief Complaint: left eye pain    Hospital Course: *Pt states she had a pimple that kept getting bigger over the past several weeks,  She had an I&D and [facility] ,  And her face swelled  double the size, closing her left eye. Purulent drainage, fever, and pain. Denies trauma . I DM since the age of 9, HTN, and ESRD  On HD since a 2016. Had I&D by dior, last pm.   Now on IVAB possible dc in am, edema decreasing**     Subjective: *no complaints      Medications:  Reviewed    Infusion Medications    dextrose       Scheduled Medications    insulin lispro  0-18 Units Subcutaneous TID WC    insulin lispro  0-9 Units Subcutaneous Nightly    ceftaroline fosamil (TEFLARO) 600 MG IVPB  200 mg Intravenous Q12H    calcium acetate  667 mg Oral TID WC    carvedilol  25 mg Oral BID WC    vitamin D  2,000 Units Oral QAM    famotidine  20 mg Oral Daily    amLODIPine  5 mg Oral Daily    insulin glargine  20 Units Subcutaneous Nightly    heparin (porcine)  5,000 Units Subcutaneous Q8H    b complex-C-folic acid  1 capsule Oral Dinner    lidocaine-EPINEPHrine  20 mL Intradermal Once     PRN Meds: glucose, dextrose, glucagon (rDNA), dextrose, ondansetron, acetaminophen, HYDROcodone 5 mg - acetaminophen    No intake or output data in the 24 hours ending 09/30/18 1239    Exam:    BP (!) 146/72   Pulse 87   Temp 97.9 °F (36.6 °C) (Temporal)   Resp 16   Ht 4' 11\" (1.499 m)   Wt 208 lb 12.8 oz (94.7 kg)   SpO2 96%   BMI 42.17 kg/m²       General appearance:  No apparent distress, appears stated age and cooperative. HEENT:  Normal cephalic, atraumatic without obvious deformity. Pupils equal, round, and reactive to light.   Edema  decreasing, no   erythema   Neck: Supple, with full range of motion. No jugular venous distention. Trachea midline.  Small amount of purulent drainage on forehead dressing Respiratory:  Normal respiratory effort. Clear to auscultation, bilaterally without Rales/Wheezes/Rhonchi. Cardiovascular:  Regular rate and rhythm with normal S1/S2 without murmurs, rubs or gallops. Abdomen: Soft, non-tender, non-distended with normal bowel sounds. Musculoskeletal:  No clubbing, cyanosis or edema bilaterally.  Full range of motion without deformity. Skin: Skin color, texture, turgor normal.  No rashes or lesions. Neurologic:  Neurovascularly intact without any focal sensory/motor deficits. Cranial nerves: II-XII intact, grossly non-focal.  Psychiatric:  Alert and oriented, thought content appropriate, normal insight  Capillary Refill: Brisk,< 3 seconds   Peripheral Pulses: +2 palpable, equal bilaterally                 Labs:   Recent Labs      09/28/18   1008   WBC  10.4   HGB  11.0*   HCT  32.4*   PLT  269     Recent Labs      09/28/18   1008  09/29/18   0733  09/30/18   0508   NA  134  136  138   K  4.0  4.7  4.5   CL  87*  91*  92*   CO2  31*  30*  28   BUN  44*  25*  39*   CREATININE  9.7*  6.3*  8.8*   CALCIUM  9.3  8.8  9.0     Recent Labs      09/28/18   1008   AST  22   ALT  17   BILITOT  0.4   ALKPHOS  61     No results for input(s): INR in the last 72 hours. No results for input(s): [place] in the last 72 hours.   Recent Labs      09/28/18   1008   AST  22   ALT  17   BILITOT  0.4   ALKPHOS  61     Recent Labs      09/28/18   1008   LACTA  2.9*     No results found for: [place] [facility]  No results found for: AMMONIA    Assessment:    Active Hospital Problems    Diagnosis Date Noted    Facial cellulitis [L03.211] 09/28/2018   ESRD on HD  HTN   I DM   s/p I&D by plastic surgery   Morbid Obesity     PLAN:  Cont dialysis  SSI  Consult ID  Cont coreg  Cont norvasc        DVT Prophylaxis: heparin   Diet:  renal and carb controlled  Code Status:  Full code     PT/OT Eval Status: na     Home today once seen by ID     Electronically signed by Marina Morgan DO on

## 2018-10-01 LAB
ANAEROBIC CULTURE: NORMAL
GRAM STAIN RESULT: ABNORMAL
ORGANISM: ABNORMAL
WOUND/ABSCESS: ABNORMAL
WOUND/ABSCESS: ABNORMAL

## 2018-10-02 ENCOUNTER — CARE COORDINATION (OUTPATIENT)
Dept: CARE COORDINATION | Age: 33
End: 2018-10-02

## 2018-10-02 NOTE — CARE COORDINATION
Spoke with Clif Martin discussed patient discharged from the hospital on 9/30/18, Primary Diagnosis not on file. went to the hospital for left facial welling. Please follow up with patient to schedule a Transitional Care visit within 7 days from date of discharge. RN TCC called patient, patient unable to talk at the time on the way to eye doctor's appointment.

## 2018-10-03 LAB
BLOOD CULTURE, ROUTINE: NORMAL
BLOOD CULTURE, ROUTINE: NORMAL
CULTURE, BLOOD 2: NORMAL

## 2018-10-09 ENCOUNTER — OFFICE VISIT (OUTPATIENT)
Dept: FAMILY MEDICINE CLINIC | Age: 33
End: 2018-10-09
Payer: MEDICARE

## 2018-10-09 DIAGNOSIS — L02.01 ABSCESS OF FACE: Primary | ICD-10-CM

## 2018-10-09 DIAGNOSIS — E10.65 UNCONTROLLED TYPE 1 DIABETES MELLITUS WITH HYPERGLYCEMIA (HCC): Chronic | ICD-10-CM

## 2018-10-09 PROCEDURE — 99495 TRANSJ CARE MGMT MOD F2F 14D: CPT | Performed by: NURSE PRACTITIONER

## 2018-10-09 PROCEDURE — 1111F DSCHRG MED/CURRENT MED MERGE: CPT | Performed by: NURSE PRACTITIONER

## 2018-10-09 NOTE — PROGRESS NOTES
Post-Discharge Transitional Care Management Services or Hospital Follow Up      Marci Shin   YOB: 1985    Date of Office Visit:  10/9/2018  Date of Hospital Admission: 9/28/18  Date of Hospital Discharge: 9/30/18  Readmission Risk Score(high >=14%.  Medium >=10%):Readmission Risk Score: 17    Care management risk score Rising risk (score 2-5) and Complex Care (Scores >=6): 5     Non face to face  following discharge, date last encounter closed (first attempt may have been earlier): *No documented post hospital discharge outreach found in the last 14 days *No documented post hospital discharge outreach found in the last 14 days    Call initiated 2 business days of discharge: *No response recorded in the last 14 days     Patient Active Problem List   Diagnosis    ESRD (end stage renal disease) on dialysis ([facility] [address]    HTN (hypertension), benign    Diabetes mellitus type 1, uncontrolled ([facility] [address]    Sepsis ([facility] [address]    Chronic deep vein thrombosis (DVT) of right upper extremity ([facility] [address]    Encounter regarding vascular access for dialysis for ESRD ([facility] [address]    Respiratory failure ([facility] [address]    Facial cellulitis       No Known Allergies    Medications listed as ordered at the time of discharge from hospital   [facility] [address] Medication Instructions REMI:    Printed on:10/24/18 0956   Medication Information                      amLODIPine (NORVASC) 5 MG tablet  Take 1 tablet by mouth daily             B Complex-C-Folic Acid (YOLANDA-JACOB) TABS  Take 1 tablet by mouth Daily with supper              calcium acetate (PHOSLO) 667 MG capsule  Take 667 mg by mouth 3 times daily (with meals)             carvedilol (COREG) 12.5 MG tablet  Take 25 mg by mouth 2 times daily (with meals)              Cholecalciferol (VITAMIN D3) 2000 units CAPS  Take 1 capsule by mouth every morning LD 8/22/208             famotidine (PEPCID) 20 MG tablet  Take 1 tablet by mouth daily             glucose 4 g chewable tablet  Take 1 tablet by mouth as needed for Low blood sugar             HUMALOG KWIKPEN 100 UNIT/ML pen  4 times daily Sliding scale             insulin glargine (TOUJEO SOLOSTAR) 300 UNIT/ML injection pen  Inject 20 Units into the skin nightly To take 1/2 dose night prior to surgery             Insulin Lispro (HUMALOG KWIKPEN SC)  Inject 10 Units into the skin 3 times daily (before meals)             Lactobacillus (PROBIOTIC ACIDOPHILUS) CAPS  Take 1 capsule by mouth every morning             vitamin B-12 (CYANOCOBALAMIN) 100 MCG tablet  Vitamin B-12 100 mcg tablet   TK 1 T PO QD                   Medications marked \"taking\" at this time  Outpatient Prescriptions Marked as Taking for the 10/9/18 encounter (Office Visit) with KEMI Yo CNP   Medication Sig Dispense Refill    glucose 4 g chewable tablet Take 1 tablet by mouth as needed for Low blood sugar 60 tablet 0    [] mupirocin (BACTROBAN) 2 % ointment Apply 3 times daily.  1 g 0    [] doxycycline hyclate (VIBRA-TABS) 100 MG tablet Take 1 tablet by mouth 2 times daily for 14 days 28 tablet 0    vitamin B-12 (CYANOCOBALAMIN) 100 MCG tablet Vitamin B-12 100 mcg tablet   TK 1 T PO QD      HUMALOG KWIKPEN 100 UNIT/ML pen 4 times daily Sliding scale  1    Lactobacillus (PROBIOTIC ACIDOPHILUS) CAPS Take 1 capsule by mouth every morning      Cholecalciferol (VITAMIN D3) 2000 units CAPS Take 1 capsule by mouth every morning LD       B Complex-C-Folic Acid (YOLANDA-JACOB) TABS Take 1 tablet by mouth Daily with supper       insulin glargine (TOUJEO SOLOSTAR) 300 UNIT/ML injection pen Inject 20 Units into the skin nightly To take 1/2 dose night prior to surgery      carvedilol (COREG) 12.5 MG tablet Take 25 mg by mouth 2 times daily (with meals)       calcium acetate (PHOSLO) 667 MG capsule Take 667 mg by mouth 3 times daily (with meals)      famotidine (PEPCID) 20 MG tablet Take 1 tablet by mouth daily (Patient taking

## 2018-10-14 ASSESSMENT — ENCOUNTER SYMPTOMS
DIARRHEA: 0
ROS SKIN COMMENTS: SEE HPI
BLURRED VISION: 0
COUGH: 0
DOUBLE VISION: 0
NAUSEA: 0
CONSTIPATION: 0
WHEEZING: 0
SHORTNESS OF BREATH: 0
VOMITING: 0

## 2018-10-24 VITALS
HEART RATE: 90 BPM | RESPIRATION RATE: 16 BRPM | OXYGEN SATURATION: 99 % | BODY MASS INDEX: 27.21 KG/M2 | SYSTOLIC BLOOD PRESSURE: 126 MMHG | WEIGHT: 135 LBS | HEIGHT: 59 IN | TEMPERATURE: 98.6 F | DIASTOLIC BLOOD PRESSURE: 74 MMHG

## 2018-10-24 PROBLEM — J96.90 RESPIRATORY FAILURE (HCC): Status: RESOLVED | Noted: 2018-07-17 | Resolved: 2018-10-24

## 2018-10-24 ASSESSMENT — ENCOUNTER SYMPTOMS
EYE REDNESS: 0
SINUS PAIN: 0
CHEST TIGHTNESS: 0
SINUS PRESSURE: 0
COUGH: 0
EYE DISCHARGE: 0
GASTROINTESTINAL NEGATIVE: 1
SHORTNESS OF BREATH: 0
EYE PAIN: 0

## 2018-11-13 ENCOUNTER — OFFICE VISIT (OUTPATIENT)
Dept: FAMILY MEDICINE CLINIC | Age: 33
End: 2018-11-13
Payer: MEDICARE

## 2018-11-13 VITALS
DIASTOLIC BLOOD PRESSURE: 60 MMHG | RESPIRATION RATE: 16 BRPM | BODY MASS INDEX: 27.62 KG/M2 | SYSTOLIC BLOOD PRESSURE: 104 MMHG | HEIGHT: 59 IN | OXYGEN SATURATION: 98 % | WEIGHT: 137 LBS | TEMPERATURE: 98.4 F | HEART RATE: 83 BPM

## 2018-11-13 DIAGNOSIS — J40 BRONCHITIS: Primary | ICD-10-CM

## 2018-11-13 DIAGNOSIS — E10.65 UNCONTROLLED TYPE 1 DIABETES MELLITUS WITH HYPERGLYCEMIA (HCC): Chronic | ICD-10-CM

## 2018-11-13 PROCEDURE — 4004F PT TOBACCO SCREEN RCVD TLK: CPT | Performed by: NURSE PRACTITIONER

## 2018-11-13 PROCEDURE — G8427 DOCREV CUR MEDS BY ELIG CLIN: HCPCS | Performed by: NURSE PRACTITIONER

## 2018-11-13 PROCEDURE — 2022F DILAT RTA XM EVC RTNOPTHY: CPT | Performed by: NURSE PRACTITIONER

## 2018-11-13 PROCEDURE — G8484 FLU IMMUNIZE NO ADMIN: HCPCS | Performed by: NURSE PRACTITIONER

## 2018-11-13 PROCEDURE — G8419 CALC BMI OUT NRM PARAM NOF/U: HCPCS | Performed by: NURSE PRACTITIONER

## 2018-11-13 PROCEDURE — 3045F PR MOST RECENT HEMOGLOBIN A1C LEVEL 7.0-9.0%: CPT | Performed by: NURSE PRACTITIONER

## 2018-11-13 PROCEDURE — 99214 OFFICE O/P EST MOD 30 MIN: CPT | Performed by: NURSE PRACTITIONER

## 2018-11-13 RX ORDER — BLOOD SUGAR DIAGNOSTIC
1 STRIP MISCELLANEOUS DAILY
Qty: 100 EACH | Refills: 3 | Status: SHIPPED
Start: 2018-11-13 | End: 2020-09-25 | Stop reason: SDUPTHER

## 2018-11-13 RX ORDER — BROMPHENIRAMINE MALEATE, PSEUDOEPHEDRINE HYDROCHLORIDE, AND DEXTROMETHORPHAN HYDROBROMIDE 2; 30; 10 MG/5ML; MG/5ML; MG/5ML
10 SYRUP ORAL 4 TIMES DAILY PRN
Qty: 473 ML | Refills: 0 | COMMUNITY
Start: 2018-11-13 | End: 2019-06-06

## 2018-11-13 ASSESSMENT — ENCOUNTER SYMPTOMS
CONSTIPATION: 0
WHEEZING: 0
DIARRHEA: 0
SHORTNESS OF BREATH: 0
VOMITING: 0
COUGH: 0
NAUSEA: 0

## 2018-11-13 NOTE — PROGRESS NOTES
Bowel sounds are normal.   Musculoskeletal: Normal range of motion. Lymphadenopathy:     She has no cervical adenopathy. Neurological: She is alert and oriented to person, place, and time. Skin: Skin is warm and dry. Psychiatric: She has a normal mood and affect. Her behavior is normal.         Assessment/Plan:  1. Bronchitis    - brompheniramine-pseudoephedrine-DM 30-2-10 MG/5ML syrup; Take 10 mLs by mouth 4 times daily as needed for Congestion or Cough  Dispense: 473 mL; Refill: 0    2. Uncontrolled type 1 diabetes mellitus with hyperglycemia (HCC)    - Insulin Syringe-Needle U-100 ([employer] INSULIN SYRINGE) 31G X 5/16\" 1 ML MISC; 1 each by Does not apply route daily  Dispense: 100 each; Refill: 3    Return in about 3 months (around 2/13/2019), or if symptoms worsen or fail to improve, for Blood pressure, Diabetes.         Nanci Gitelman, KEMI - CNP

## 2018-11-26 ENCOUNTER — APPOINTMENT (OUTPATIENT)
Dept: GENERAL RADIOLOGY | Age: 33
DRG: 193 | End: 2018-11-26
Payer: MEDICARE

## 2018-11-26 ENCOUNTER — APPOINTMENT (OUTPATIENT)
Dept: CT IMAGING | Age: 33
DRG: 193 | End: 2018-11-26
Payer: MEDICARE

## 2018-11-26 ENCOUNTER — HOSPITAL ENCOUNTER (INPATIENT)
Age: 33
LOS: 2 days | Discharge: HOME | DRG: 193 | End: 2018-11-28
Attending: EMERGENCY MEDICINE | Admitting: INTERNAL MEDICINE
Payer: MEDICARE

## 2018-11-26 DIAGNOSIS — R06.00 DYSPNEA AND RESPIRATORY ABNORMALITIES: ICD-10-CM

## 2018-11-26 DIAGNOSIS — J18.9 PNEUMONIA DUE TO ORGANISM: Primary | ICD-10-CM

## 2018-11-26 DIAGNOSIS — R06.89 DYSPNEA AND RESPIRATORY ABNORMALITIES: ICD-10-CM

## 2018-11-26 LAB
AMYLASE: 83 U/L (ref 20–100)
ANION GAP SERPL CALCULATED.3IONS-SCNC: 15 MMOL/L (ref 7–16)
APTT: 29.8 SEC (ref 24.5–35.1)
B.E.: 7.3 MMOL/L (ref -3–3)
BASOPHILS ABSOLUTE: 0.01 E9/L (ref 0–0.2)
BASOPHILS RELATIVE PERCENT: 0.1 % (ref 0–2)
BUN BLDV-MCNC: 36 MG/DL (ref 6–20)
CALCIUM SERPL-MCNC: 8.9 MG/DL (ref 8.6–10.2)
CHLORIDE BLD-SCNC: 91 MMOL/L (ref 98–107)
CO2: 32 MMOL/L (ref 22–29)
COHB: 2.2 % (ref 0–1.5)
CREAT SERPL-MCNC: 12.1 MG/DL (ref 0.5–1)
CRITICAL: ABNORMAL
DATE ANALYZED: ABNORMAL
DATE OF COLLECTION: ABNORMAL
EOSINOPHILS ABSOLUTE: 0.07 E9/L (ref 0.05–0.5)
EOSINOPHILS RELATIVE PERCENT: 0.6 % (ref 0–6)
GFR AFRICAN AMERICAN: 4
GFR NON-AFRICAN AMERICAN: 4 ML/MIN/1.73
GLUCOSE BLD-MCNC: 120 MG/DL (ref 74–99)
HCO3: 31.9 MMOL/L (ref 22–26)
HCT VFR BLD CALC: 32.4 % (ref 34–48)
HEMOGLOBIN: 11.1 G/DL (ref 11.5–15.5)
HHB: 8 % (ref 0–5)
IMMATURE GRANULOCYTES #: 0.09 E9/L
IMMATURE GRANULOCYTES %: 0.8 % (ref 0–5)
INFLUENZA A BY PCR: NOT DETECTED
INFLUENZA B BY PCR: NOT DETECTED
INR BLD: 1.1
LAB: ABNORMAL
LACTIC ACID, SEPSIS: 1.1 MMOL/L (ref 0.5–1.9)
LIPASE: 12 U/L (ref 13–60)
LYMPHOCYTES ABSOLUTE: 1.08 E9/L (ref 1.5–4)
LYMPHOCYTES RELATIVE PERCENT: 9.7 % (ref 20–42)
Lab: ABNORMAL
MCH RBC QN AUTO: 32.6 PG (ref 26–35)
MCHC RBC AUTO-ENTMCNC: 34.3 % (ref 32–34.5)
MCV RBC AUTO: 95 FL (ref 80–99.9)
METER GLUCOSE: 140 MG/DL (ref 74–99)
METHB: 0.3 % (ref 0–1.5)
MODE: ABNORMAL
MONOCYTES ABSOLUTE: 0.6 E9/L (ref 0.1–0.95)
MONOCYTES RELATIVE PERCENT: 5.4 % (ref 2–12)
NEUTROPHILS ABSOLUTE: 9.24 E9/L (ref 1.8–7.3)
NEUTROPHILS RELATIVE PERCENT: 83.4 % (ref 43–80)
O2 CONTENT: 14.9 ML/DL
O2 SATURATION: 91.8 % (ref 92–98.5)
O2HB: 89.5 % (ref 94–97)
OPERATOR ID: ABNORMAL
PATIENT TEMP: 37 C
PCO2: 45.1 MMHG (ref 35–45)
PDW BLD-RTO: 15.2 FL (ref 11.5–15)
PH BLOOD GAS: 7.47 (ref 7.35–7.45)
PLATELET # BLD: 176 E9/L (ref 130–450)
PMV BLD AUTO: 10.7 FL (ref 7–12)
PO2: 63.1 MMHG (ref 60–100)
POTASSIUM REFLEX MAGNESIUM: 5.1 MMOL/L (ref 3.5–5)
PROTHROMBIN TIME: 12.2 SEC (ref 9.3–12.4)
RBC # BLD: 3.41 E12/L (ref 3.5–5.5)
SODIUM BLD-SCNC: 138 MMOL/L (ref 132–146)
SOURCE, BLOOD GAS: ABNORMAL
THB: 11.8 G/DL (ref 11.5–16.5)
TIME ANALYZED: 1334
TROPONIN: <0.01 NG/ML (ref 0–0.03)
WBC # BLD: 11.1 E9/L (ref 4.5–11.5)

## 2018-11-26 PROCEDURE — 83605 ASSAY OF LACTIC ACID: CPT

## 2018-11-26 PROCEDURE — 87081 CULTURE SCREEN ONLY: CPT

## 2018-11-26 PROCEDURE — 71045 X-RAY EXAM CHEST 1 VIEW: CPT

## 2018-11-26 PROCEDURE — 85610 PROTHROMBIN TIME: CPT

## 2018-11-26 PROCEDURE — 82150 ASSAY OF AMYLASE: CPT

## 2018-11-26 PROCEDURE — 87205 SMEAR GRAM STAIN: CPT

## 2018-11-26 PROCEDURE — 85730 THROMBOPLASTIN TIME PARTIAL: CPT

## 2018-11-26 PROCEDURE — 87040 BLOOD CULTURE FOR BACTERIA: CPT

## 2018-11-26 PROCEDURE — 6360000002 HC RX W HCPCS: Performed by: EMERGENCY MEDICINE

## 2018-11-26 PROCEDURE — 87581 M.PNEUMON DNA AMP PROBE: CPT

## 2018-11-26 PROCEDURE — 82962 GLUCOSE BLOOD TEST: CPT

## 2018-11-26 PROCEDURE — 71275 CT ANGIOGRAPHY CHEST: CPT

## 2018-11-26 PROCEDURE — 85025 COMPLETE CBC W/AUTO DIFF WBC: CPT

## 2018-11-26 PROCEDURE — 6370000000 HC RX 637 (ALT 250 FOR IP): Performed by: PHYSICIAN ASSISTANT

## 2018-11-26 PROCEDURE — 87070 CULTURE OTHR SPECIMN AEROBIC: CPT

## 2018-11-26 PROCEDURE — 90935 HEMODIALYSIS ONE EVALUATION: CPT

## 2018-11-26 PROCEDURE — 99291 CRITICAL CARE FIRST HOUR: CPT

## 2018-11-26 PROCEDURE — 5A1D70Z PERFORMANCE OF URINARY FILTRATION, INTERMITTENT, LESS THAN 6 HOURS PER DAY: ICD-10-PCS | Performed by: INTERNAL MEDICINE

## 2018-11-26 PROCEDURE — 6370000000 HC RX 637 (ALT 250 FOR IP): Performed by: INTERNAL MEDICINE

## 2018-11-26 PROCEDURE — 99223 1ST HOSP IP/OBS HIGH 75: CPT | Performed by: INTERNAL MEDICINE

## 2018-11-26 PROCEDURE — 6360000004 HC RX CONTRAST MEDICATION: Performed by: RADIOLOGY

## 2018-11-26 PROCEDURE — 99221 1ST HOSP IP/OBS SF/LOW 40: CPT | Performed by: SURGERY

## 2018-11-26 PROCEDURE — 2580000003 HC RX 258: Performed by: PHYSICIAN ASSISTANT

## 2018-11-26 PROCEDURE — 2060000000 HC ICU INTERMEDIATE R&B

## 2018-11-26 PROCEDURE — 80048 BASIC METABOLIC PNL TOTAL CA: CPT

## 2018-11-26 PROCEDURE — 6370000000 HC RX 637 (ALT 250 FOR IP): Performed by: EMERGENCY MEDICINE

## 2018-11-26 PROCEDURE — 87798 DETECT AGENT NOS DNA AMP: CPT

## 2018-11-26 PROCEDURE — 82805 BLOOD GASES W/O2 SATURATION: CPT

## 2018-11-26 PROCEDURE — 87502 INFLUENZA DNA AMP PROBE: CPT

## 2018-11-26 PROCEDURE — 2580000003 HC RX 258: Performed by: EMERGENCY MEDICINE

## 2018-11-26 PROCEDURE — 83690 ASSAY OF LIPASE: CPT

## 2018-11-26 PROCEDURE — 87486 CHLMYD PNEUM DNA AMP PROBE: CPT

## 2018-11-26 PROCEDURE — 87633 RESP VIRUS 12-25 TARGETS: CPT

## 2018-11-26 PROCEDURE — 84484 ASSAY OF TROPONIN QUANT: CPT

## 2018-11-26 RX ORDER — ONDANSETRON 2 MG/ML
4 INJECTION INTRAMUSCULAR; INTRAVENOUS EVERY 6 HOURS PRN
Status: DISCONTINUED | OUTPATIENT
Start: 2018-11-26 | End: 2018-11-28 | Stop reason: HOSPADM

## 2018-11-26 RX ORDER — CHOLECALCIFEROL (VITAMIN D3) 10 MCG
1 TABLET ORAL DAILY
Status: DISCONTINUED | OUTPATIENT
Start: 2018-11-26 | End: 2018-11-28 | Stop reason: HOSPADM

## 2018-11-26 RX ORDER — HEPARIN SODIUM 1000 [USP'U]/ML
80 INJECTION, SOLUTION INTRAVENOUS; SUBCUTANEOUS PRN
Status: DISCONTINUED | OUTPATIENT
Start: 2018-11-26 | End: 2018-11-26

## 2018-11-26 RX ORDER — VITAMIN C
1 TAB ORAL
Status: DISCONTINUED | OUTPATIENT
Start: 2018-11-26 | End: 2018-11-26 | Stop reason: ALTCHOICE

## 2018-11-26 RX ORDER — CHOLECALCIFEROL (VITAMIN D3) 125 MCG
1 CAPSULE ORAL EVERY MORNING
Status: DISCONTINUED | OUTPATIENT
Start: 2018-11-27 | End: 2018-11-26 | Stop reason: RX

## 2018-11-26 RX ORDER — AMLODIPINE BESYLATE 5 MG/1
5 TABLET ORAL EVERY MORNING
Status: DISCONTINUED | OUTPATIENT
Start: 2018-11-27 | End: 2018-11-28 | Stop reason: HOSPADM

## 2018-11-26 RX ORDER — FAMOTIDINE 20 MG/1
20 TABLET, FILM COATED ORAL DAILY
Status: DISCONTINUED | OUTPATIENT
Start: 2018-11-26 | End: 2018-11-28 | Stop reason: HOSPADM

## 2018-11-26 RX ORDER — ACETAMINOPHEN 325 MG/1
650 TABLET ORAL ONCE
Status: COMPLETED | OUTPATIENT
Start: 2018-11-26 | End: 2018-11-26

## 2018-11-26 RX ORDER — DEXTROSE MONOHYDRATE 25 G/50ML
12.5 INJECTION, SOLUTION INTRAVENOUS PRN
Status: DISCONTINUED | OUTPATIENT
Start: 2018-11-26 | End: 2018-11-28 | Stop reason: HOSPADM

## 2018-11-26 RX ORDER — HEPARIN SODIUM 1000 [USP'U]/ML
80 INJECTION, SOLUTION INTRAVENOUS; SUBCUTANEOUS ONCE
Status: DISCONTINUED | OUTPATIENT
Start: 2018-11-26 | End: 2018-11-26

## 2018-11-26 RX ORDER — DEXTROSE MONOHYDRATE 50 MG/ML
100 INJECTION, SOLUTION INTRAVENOUS PRN
Status: DISCONTINUED | OUTPATIENT
Start: 2018-11-26 | End: 2018-11-28 | Stop reason: HOSPADM

## 2018-11-26 RX ORDER — HEPARIN SODIUM 10000 [USP'U]/100ML
18 INJECTION, SOLUTION INTRAVENOUS CONTINUOUS
Status: DISCONTINUED | OUTPATIENT
Start: 2018-11-26 | End: 2018-11-26

## 2018-11-26 RX ORDER — NICOTINE POLACRILEX 4 MG
15 LOZENGE BUCCAL PRN
Status: DISCONTINUED | OUTPATIENT
Start: 2018-11-26 | End: 2018-11-28 | Stop reason: HOSPADM

## 2018-11-26 RX ORDER — HEPARIN SODIUM 1000 [USP'U]/ML
40 INJECTION, SOLUTION INTRAVENOUS; SUBCUTANEOUS PRN
Status: DISCONTINUED | OUTPATIENT
Start: 2018-11-26 | End: 2018-11-26

## 2018-11-26 RX ORDER — ACETAMINOPHEN 325 MG/1
650 TABLET ORAL EVERY 4 HOURS PRN
Status: DISCONTINUED | OUTPATIENT
Start: 2018-11-26 | End: 2018-11-28 | Stop reason: HOSPADM

## 2018-11-26 RX ORDER — CHOLECALCIFEROL (VITAMIN D3) 50 MCG
2000 TABLET ORAL EVERY MORNING
Status: DISCONTINUED | OUTPATIENT
Start: 2018-11-27 | End: 2018-11-28 | Stop reason: HOSPADM

## 2018-11-26 RX ORDER — LACTOBACILLUS RHAMNOSUS GG 10B CELL
1 CAPSULE ORAL DAILY
Status: DISCONTINUED | OUTPATIENT
Start: 2018-11-26 | End: 2018-11-28 | Stop reason: HOSPADM

## 2018-11-26 RX ORDER — HEPARIN SODIUM 10000 [USP'U]/ML
5000 INJECTION, SOLUTION INTRAVENOUS; SUBCUTANEOUS EVERY 8 HOURS
Status: DISCONTINUED | OUTPATIENT
Start: 2018-11-26 | End: 2018-11-28 | Stop reason: HOSPADM

## 2018-11-26 RX ORDER — SODIUM CHLORIDE 0.9 % (FLUSH) 0.9 %
10 SYRINGE (ML) INJECTION EVERY 12 HOURS SCHEDULED
Status: DISCONTINUED | OUTPATIENT
Start: 2018-11-26 | End: 2018-11-28 | Stop reason: HOSPADM

## 2018-11-26 RX ORDER — CARVEDILOL 25 MG/1
25 TABLET ORAL 2 TIMES DAILY WITH MEALS
Status: DISCONTINUED | OUTPATIENT
Start: 2018-11-26 | End: 2018-11-28 | Stop reason: HOSPADM

## 2018-11-26 RX ORDER — INSULIN GLARGINE 100 [IU]/ML
20 INJECTION, SOLUTION SUBCUTANEOUS NIGHTLY
Status: DISCONTINUED | OUTPATIENT
Start: 2018-11-26 | End: 2018-11-26 | Stop reason: DRUGHIGH

## 2018-11-26 RX ORDER — SODIUM CHLORIDE 0.9 % (FLUSH) 0.9 %
10 SYRINGE (ML) INJECTION PRN
Status: DISCONTINUED | OUTPATIENT
Start: 2018-11-26 | End: 2018-11-28 | Stop reason: HOSPADM

## 2018-11-26 RX ORDER — INSULIN GLARGINE 100 [IU]/ML
16 INJECTION, SOLUTION SUBCUTANEOUS NIGHTLY
Status: DISCONTINUED | OUTPATIENT
Start: 2018-11-26 | End: 2018-11-28 | Stop reason: HOSPADM

## 2018-11-26 RX ORDER — ACETAMINOPHEN 325 MG/1
650 TABLET ORAL EVERY 6 HOURS PRN
Status: DISCONTINUED | OUTPATIENT
Start: 2018-11-26 | End: 2018-11-28 | Stop reason: HOSPADM

## 2018-11-26 RX ADMIN — IOPAMIDOL 65 ML: 755 INJECTION, SOLUTION INTRAVENOUS at 12:37

## 2018-11-26 RX ADMIN — Medication 10 ML: at 20:58

## 2018-11-26 RX ADMIN — CEFEPIME HYDROCHLORIDE 2 G: 2 INJECTION, POWDER, FOR SOLUTION INTRAVENOUS at 13:29

## 2018-11-26 RX ADMIN — ACETAMINOPHEN 650 MG: 325 TABLET ORAL at 11:04

## 2018-11-26 RX ADMIN — FAMOTIDINE 20 MG: 20 TABLET ORAL at 20:46

## 2018-11-26 RX ADMIN — VANCOMYCIN HYDROCHLORIDE 1250 MG: 10 INJECTION, POWDER, LYOPHILIZED, FOR SOLUTION INTRAVENOUS at 17:25

## 2018-11-26 RX ADMIN — CARVEDILOL 25 MG: 25 TABLET, FILM COATED ORAL at 20:46

## 2018-11-26 RX ADMIN — INSULIN GLARGINE 16 UNITS: 100 INJECTION, SOLUTION SUBCUTANEOUS at 20:54

## 2018-11-26 RX ADMIN — CALCIUM ACETATE 667 MG: 667 CAPSULE ORAL at 20:46

## 2018-11-26 ASSESSMENT — PAIN SCALES - GENERAL
PAINLEVEL_OUTOF10: 0

## 2018-11-26 ASSESSMENT — PAIN DESCRIPTION - FREQUENCY: FREQUENCY: CONTINUOUS

## 2018-11-26 ASSESSMENT — PAIN DESCRIPTION - LOCATION: LOCATION: BACK

## 2018-11-26 ASSESSMENT — PAIN DESCRIPTION - PAIN TYPE: TYPE: ACUTE PAIN

## 2018-11-26 ASSESSMENT — PAIN DESCRIPTION - PROGRESSION: CLINICAL_PROGRESSION: NOT CHANGED

## 2018-11-26 ASSESSMENT — PAIN DESCRIPTION - DESCRIPTORS: DESCRIPTORS: PATIENT UNABLE TO DESCRIBE

## 2018-11-26 NOTE — CONSULTS
 OR INSERT NON-TUNNEL CV CATH N/A 5/10/2018    TESIO CATHETER INSERTION performed by Nelli Fajardo MD at [address] [place] Left 8/28/2018    PARS PLANA VITRECTOMY 25 GAUGE, VITREOUS HEMORRHAGE REMOVAL, ENDO LASER, AIR/FLUID  EXCHANGE LEFT EYE performed by Emil Bacon MD at [facility] OR       Current Medications:     Current Facility-Administered Medications:     cefepime (MAXIPIME) 2 g IVPB extended (mini-bag), 2 g, Intravenous, Once, Deirdre Yin DO, Last Rate: 12.5 mL/hr at 11/26/18 1329, 2 g at 11/26/18 1329    vancomycin (VANCOCIN) 1,250 mg in dextrose 5 % 250 mL IVPB, 20 mg/kg, Intravenous, Once, Deirdre Yin DO    Current Outpatient Prescriptions:     Insulin Lispro (HUMALOG KWIKPEN SC), Inject 10 Units into the skin 3 times daily (before meals), Disp: , Rfl:     vitamin B-12 (CYANOCOBALAMIN) 100 MCG tablet, Vitamin B-12 100 mcg tablet  TK 1 T PO QD, Disp: , Rfl:     Lactobacillus (PROBIOTIC ACIDOPHILUS) CAPS, Take 1 capsule by mouth every morning, Disp: , Rfl:     Cholecalciferol (VITAMIN D3) 2000 units CAPS, Take 1 capsule by mouth every morning LD 8/22/208, Disp: , Rfl:     B Complex-C-Folic Acid (YOLANDA-JACOB) TABS, Take 1 tablet by mouth Daily with supper , Disp: , Rfl:     insulin glargine (TOUJEO SOLOSTAR) 300 UNIT/ML injection pen, Inject 20 Units into the skin nightly , Disp: , Rfl:     carvedilol (COREG) 12.5 MG tablet, Take 25 mg by mouth 2 times daily (with meals) , Disp: , Rfl:     calcium acetate (PHOSLO) 667 MG capsule, Take 667 mg by mouth 3 times daily (with meals), Disp: , Rfl:     famotidine (PEPCID) 20 MG tablet, Take 1 tablet by mouth daily (Patient taking differently: Take 20 mg by mouth every morning ), Disp: 60 tablet, Rfl: 3    amLODIPine (NORVASC) 5 MG tablet, Take 1 tablet by mouth daily (Patient taking differently: Take 5 mg by mouth every morning ), Disp: 30 tablet, Rfl: 3    Insulin Syringe-Needle U-100 (Santosh Coyle

## 2018-11-26 NOTE — H&P
(COREG) 12.5 MG tablet Take 25 mg by mouth 2 times daily (with meals)    Yes Historical Provider, MD   calcium acetate (PHOSLO) 667 MG capsule Take 667 mg by mouth 3 times daily (with meals)   Yes Historical Provider, MD   famotidine (PEPCID) 20 MG tablet Take 1 tablet by mouth daily  Patient taking differently: Take 20 mg by mouth every morning  8/24/16  Yes Nick Moritz, MD   amLODIPine (NORVASC) 5 MG tablet Take 1 tablet by mouth daily  Patient taking differently: Take 5 mg by mouth every morning  4/7/16  Yes Adolfo Cartwright, DO   Insulin Syringe-Needle U-100 ([employer] INSULIN SYRINGE) 31G X 5/16\" 1 ML MISC 1 each by Does not apply route daily 11/13/18   KEMI Elizondo CNP   brompheniramine-pseudoephedrine-DM 30-2-10 MG/5ML syrup Take 10 mLs by mouth 4 times daily as needed for Congestion or Cough 11/13/18   KEMI Elizondo CNP   glucose 4 g chewable tablet Take 1 tablet by mouth as needed for Low blood sugar 10/9/18   KEMI Elizondo CNP       Allergies:    Patient has no known allergies. Social History:    reports that she has been smoking Cigarettes. She has a 3.75 pack-year smoking history. She has never used smokeless tobacco. She reports that she does not drink alcohol or use drugs.     Family History   Problem Relation Age of Onset   [facility] Stroke Mother     Cancer Father     Diabetes Paternal Aunt        PHYSICAL EXAM:  Vitals:  BP (!) 150/119   Pulse 93   Temp 99 °F (37.2 °C)   Resp 18   Ht 4' 11\" (1.499 m)   Wt 135 lb (61.2 kg)   SpO2 100%   BMI 27.27 kg/m²   General Appearance: alert and oriented to person, place and time, well-developed and well-nourished, in no acute distress  Skin: warm and dry, no rash or erythema  Head: normocephalic and atraumatic  Neck: neck supple and non tender without mass   Pulmonary/Chest: Inspiratory and expiratory rhonchi and scattered wheezing noted throughout without rales, prolonged expiratory phase, or retractions  Cardiovascular: normal rate, normal S1 and S2 and no gallops  Abdomen: soft, non-tender, non-distended, normal bowel sounds, no masses or organomegaly  Extremities: no cyanosis and no clubbing  Musculoskeletal: normal range of motion, no joint swelling, deformity or tenderness  Neurologic: gait and coordination normal and speech normal      LABS:  Recent Labs      18   1042   NA  138   K  5.1*   CL  91*   CO2  32*   BUN  36*   CREATININE  12.1*   GLUCOSE  120*   CALCIUM  8.9       Recent Labs      18   1042   WBC  11.1   RBC  3.41*   HGB  11.1*   HCT  32.4*   MCV  95.0   MCH  32.6   MCHC  34.3   RDW  15.2*   PLT  176   MPV  10.7       No results for input(s): POCGLU in the last 72 hours.     CBC:   Lab Results   Component Value Date    WBC 11.1 2018    RBC 3.41 2018    RBC  2016      RBC           [id number]    transfused   16  07:02  [facility]    RBC  2016      RBC           [id number]    released     16  00:54  [facility]    HGB 11.1 2018    HCT 32.4 2018    MCV 95.0 2018    MCH 32.6 2018    MCHC 34.3 2018    RDW 15.2 2018     2018    MPV 10.7 2018     CMP:    Lab Results   Component Value Date     2018    K 5.1 2018    CL 91 2018    CO2 32 2018    BUN 36 2018    CREATININE 12.1 2018    GFRAA 4 2018    LABGLOM 4 2018    GLUCOSE 120 2018    GLUCOSE 279 2011    PROT 7.6 2018    LABALBU 3.9 2018    LABALBU 3.9 01/10/2011    CALCIUM 8.9 2018    BILITOT 0.4 2018    ALKPHOS 61 2018    AST 22 2018    ALT 17 2018     Magnesium:    Lab Results   Component Value Date    MG 2.5 2018     Phosphorus:    Lab Results   Component Value Date    PHOS 5.3 2018       Radiology: Xr Chest 1 Vw    Result Date: 2018  Patient MRN:  [id number] : 1985 Age: 28 years Gender: Female Order Date: 2018 10:15 AM EXAM: XR CHEST 1 VIEW NUMBER OF IMAGES:  1 view INDICATION:  cough COMPARISON: Chest x-ray May 10, 2018 FINDINGS: Interval removal of the left chest wall dialysis type catheter. Cardiac silhouette appears at upper limits of normal. Perihilar vascular congestion, interstitial thickening and airspace opacities are present. These may represent edema however infiltrates could not be excluded. Blunting of the costophrenic angles could represent a small pleural effusions. No pneumothorax. Upper abdomen is unremarkable. 1. Borderline heart size associated with a small pleural effusions, perihilar vascular congestion and interstitial thickening representing edema or fluid overload. Infiltrates could not be excluded. Cta Pulmonary W Contrast    Result Date: 2018  Patient MRN:  [id number] : 1985 Age: 28 years Gender: Female Order Date:  2018 11:45 AM EXAM: CTA PULMONARY W CONTRAST NUMBER OF IMAGES:  3 8 INDICATION:  CHEST PAIN, ACUTE, acute respiratory distress, dyspnea on exertion, dialysis patient COMPARISON: Chest radiographic study 1115 hours today and anterior right chest May 10, 2018 Technique: Low-dose CT  acquisition technique included one of following options; 1 . Automated exposure control, 2. Adjustment of MA and or KV according to patient's size or 3. Use of iterative reconstruction. Contiguous spiral images were obtained in the axial plane, following the administration of 65 mL of Isovue-370intravenous contrast using CT angiographic protocol. Sagittal and coronal images were reconstructed from the axial plane acquisition. Addition MIP reconstructions were presented to aid in the interpretation of this study. Findings: The central pulmonary arterial tree shows no evidence of filling defects, truncated vessels, regional oligemia, peripheral infarcts, or pleural effusion is suggestive acute embolism.  Lung window images show no evidence of organized pneumonia or mass rigidity. Breast/Rectal/Genitourinary: not pertinent. Extremities:  Negative for lower extremity edema  Skin:  Warm and dry  Vascular: 2/4 Dorsalis Pedis pulses bilaterally. Neuro:  Cranial nerves 2-12 grossly intact, no focal weakness or change in sensation noted. Extraocular muscles intact. Pupils equal, round, reactive to light. I agree with the assessment and plan of Cristal Meng. Pneumonia  ESRD  hyperkalemia  htn  Dm type 1 controlled  Hx of dvt        Electronically signed by Adilia Gonzalez D.O.   Hospitalist  4M Hospitalist Service at [facility]

## 2018-11-26 NOTE — CONSULTS
HISTORY  08/19/2016    pericardial window    OTHER SURGICAL HISTORY  08/23/2016     r tesio insertion  / remove 8/16/2018    OTHER SURGICAL HISTORY Left 02/17/2017    insertion a-v fistula left arm    [place] ANASTOMOSIS,AV,ANY SITE Left 7/17/2018    REVISION AV FISTULA - LEFT ARM performed by Farzana Lay MD at [address] NON-TUNNEL CV CATH N/A 5/10/2018    TESIO CATHETER INSERTION performed by Criss Kapadia MD at [address] [facility] [address] MEMBRANE Left 8/28/2018    PARS PLANA VITRECTOMY 25 GAUGE, VITREOUS HEMORRHAGE REMOVAL, ENDO LASER, AIR/FLUID  EXCHANGE LEFT EYE performed by Db Fuentes MD at [facility] Marital status:      Spouse name: N/A    Number of children: N/A    Years of education: N/A     Occupational History    Not on file. Social History Main Topics    Smoking status: Current Every Day Smoker     Packs/day: 0.25     Years: 15.00     Types: Cigarettes    Smokeless tobacco: Never Used      Comment: 4 cig daily    Alcohol use No    Drug use: No    Sexual activity: Not on file     Other Topics Concern    Not on file     Social History Narrative    ** Merged History Encounter **            Family History  Family History   Problem Relation Age of Onset    Stroke Mother     Cancer Father     Diabetes Paternal Aunt        Scheduled Meds:   cefepime  2 g Intravenous Once    vancomycin  20 mg/kg Intravenous Once       Continuous Infusions:  [unfilled]    PRN meds      Allergies:  Patient has no known allergies. Review of Systems     Pertinent positives and negatives as in HPI. Other systems reviewed and were negative.      LAST 3 CMP  Recent Labs      11/26/18   1042   NA  138   K  5.1*   CL  91*   CO2  32*   BUN  36*   CREATININE  12.1*   GLUCOSE  120*   CALCIUM  8.9       LAST 3 CBC:  Recent Labs      11/26/18   1042   WBC  11.1   RBC  3.41*   HGB  11.1*   HCT  32.4*   PLT  176

## 2018-11-27 LAB
ANION GAP SERPL CALCULATED.3IONS-SCNC: 11 MMOL/L (ref 7–16)
BASOPHILS ABSOLUTE: 0.02 E9/L (ref 0–0.2)
BASOPHILS RELATIVE PERCENT: 0.2 % (ref 0–2)
BUN BLDV-MCNC: 23 MG/DL (ref 6–20)
CALCIUM SERPL-MCNC: 9 MG/DL (ref 8.6–10.2)
CHLORIDE BLD-SCNC: 93 MMOL/L (ref 98–107)
CO2: 33 MMOL/L (ref 22–29)
CREAT SERPL-MCNC: 7.3 MG/DL (ref 0.5–1)
EOSINOPHILS ABSOLUTE: 0.22 E9/L (ref 0.05–0.5)
EOSINOPHILS RELATIVE PERCENT: 2.2 % (ref 0–6)
FILM ARRAY ADENOVIRUS: ABNORMAL
FILM ARRAY BORDETELLA PERTUSSIS: ABNORMAL
FILM ARRAY CHLAMYDOPHILIA PNEUMONIAE: ABNORMAL
FILM ARRAY CORONAVIRUS 229E: ABNORMAL
FILM ARRAY CORONAVIRUS HKU1: ABNORMAL
FILM ARRAY CORONAVIRUS NL63: ABNORMAL
FILM ARRAY CORONAVIRUS OC43: ABNORMAL
FILM ARRAY INFLUENZA A VIRUS 09H1: ABNORMAL
FILM ARRAY INFLUENZA A VIRUS H1: ABNORMAL
FILM ARRAY INFLUENZA A VIRUS H3: ABNORMAL
FILM ARRAY INFLUENZA A VIRUS: ABNORMAL
FILM ARRAY INFLUENZA B: ABNORMAL
FILM ARRAY METAPNEUMOVIRUS: ABNORMAL
FILM ARRAY MYCOPLASMA PNEUMONIAE: ABNORMAL
FILM ARRAY PARAINFLUENZA VIRUS 1: ABNORMAL
FILM ARRAY PARAINFLUENZA VIRUS 2: ABNORMAL
FILM ARRAY PARAINFLUENZA VIRUS 3: ABNORMAL
FILM ARRAY RESPIRATORY SYNCITIAL VIRUS: ABNORMAL
FILM ARRAY RHINOVIRUS/ENTEROVIRUS: ABNORMAL
GFR AFRICAN AMERICAN: 8
GFR NON-AFRICAN AMERICAN: 8 ML/MIN/1.73
GLUCOSE BLD-MCNC: 88 MG/DL (ref 74–99)
HCT VFR BLD CALC: 32.7 % (ref 34–48)
HEMOGLOBIN: 11.3 G/DL (ref 11.5–15.5)
IMMATURE GRANULOCYTES #: 0.08 E9/L
IMMATURE GRANULOCYTES %: 0.8 % (ref 0–5)
LYMPHOCYTES ABSOLUTE: 1.46 E9/L (ref 1.5–4)
LYMPHOCYTES RELATIVE PERCENT: 14.5 % (ref 20–42)
MCH RBC QN AUTO: 32.8 PG (ref 26–35)
MCHC RBC AUTO-ENTMCNC: 34.6 % (ref 32–34.5)
MCV RBC AUTO: 95.1 FL (ref 80–99.9)
METER GLUCOSE: 108 MG/DL (ref 74–99)
METER GLUCOSE: 281 MG/DL (ref 74–99)
METER GLUCOSE: 332 MG/DL (ref 74–99)
METER GLUCOSE: 47 MG/DL (ref 74–99)
METER GLUCOSE: 93 MG/DL (ref 74–99)
MONOCYTES ABSOLUTE: 0.34 E9/L (ref 0.1–0.95)
MONOCYTES RELATIVE PERCENT: 3.4 % (ref 2–12)
NEUTROPHILS ABSOLUTE: 7.94 E9/L (ref 1.8–7.3)
NEUTROPHILS RELATIVE PERCENT: 78.9 % (ref 43–80)
ORGANISM: ABNORMAL
PDW BLD-RTO: 15.1 FL (ref 11.5–15)
PLATELET # BLD: 159 E9/L (ref 130–450)
PMV BLD AUTO: 11 FL (ref 7–12)
POTASSIUM REFLEX MAGNESIUM: 4.4 MMOL/L (ref 3.5–5)
PROCALCITONIN: 6.56 NG/ML (ref 0–0.08)
RBC # BLD: 3.44 E12/L (ref 3.5–5.5)
SODIUM BLD-SCNC: 137 MMOL/L (ref 132–146)
VITAMIN D 25-HYDROXY: 60 NG/ML (ref 30–100)
WBC # BLD: 10.1 E9/L (ref 4.5–11.5)

## 2018-11-27 PROCEDURE — 80048 BASIC METABOLIC PNL TOTAL CA: CPT

## 2018-11-27 PROCEDURE — 2060000000 HC ICU INTERMEDIATE R&B

## 2018-11-27 PROCEDURE — 85025 COMPLETE CBC W/AUTO DIFF WBC: CPT

## 2018-11-27 PROCEDURE — 84145 PROCALCITONIN (PCT): CPT

## 2018-11-27 PROCEDURE — 6360000002 HC RX W HCPCS: Performed by: INTERNAL MEDICINE

## 2018-11-27 PROCEDURE — 2580000003 HC RX 258: Performed by: PHYSICIAN ASSISTANT

## 2018-11-27 PROCEDURE — 6370000000 HC RX 637 (ALT 250 FOR IP): Performed by: INTERNAL MEDICINE

## 2018-11-27 PROCEDURE — 99232 SBSQ HOSP IP/OBS MODERATE 35: CPT | Performed by: INTERNAL MEDICINE

## 2018-11-27 PROCEDURE — 36415 COLL VENOUS BLD VENIPUNCTURE: CPT

## 2018-11-27 PROCEDURE — 2700000000 HC OXYGEN THERAPY PER DAY

## 2018-11-27 PROCEDURE — 82306 VITAMIN D 25 HYDROXY: CPT

## 2018-11-27 PROCEDURE — 82962 GLUCOSE BLOOD TEST: CPT

## 2018-11-27 PROCEDURE — 6370000000 HC RX 637 (ALT 250 FOR IP): Performed by: PHYSICIAN ASSISTANT

## 2018-11-27 RX ORDER — LIDOCAINE 40 MG/G
CREAM TOPICAL PRN
Status: DISCONTINUED | OUTPATIENT
Start: 2018-11-28 | End: 2018-11-28 | Stop reason: HOSPADM

## 2018-11-27 RX ADMIN — FAMOTIDINE 20 MG: 20 TABLET ORAL at 09:39

## 2018-11-27 RX ADMIN — CARVEDILOL 25 MG: 25 TABLET, FILM COATED ORAL at 17:41

## 2018-11-27 RX ADMIN — CALCIUM ACETATE 667 MG: 667 CAPSULE ORAL at 12:12

## 2018-11-27 RX ADMIN — Medication 10 ML: at 09:14

## 2018-11-27 RX ADMIN — AMLODIPINE BESYLATE 5 MG: 5 TABLET ORAL at 09:39

## 2018-11-27 RX ADMIN — CALCIUM ACETATE 667 MG: 667 CAPSULE ORAL at 09:39

## 2018-11-27 RX ADMIN — CARVEDILOL 25 MG: 25 TABLET, FILM COATED ORAL at 09:38

## 2018-11-27 RX ADMIN — Medication 10 ML: at 09:40

## 2018-11-27 RX ADMIN — CHOLECALCIFEROL TAB 50 MCG (2000 UNIT) 2000 UNITS: 50 TAB at 09:38

## 2018-11-27 RX ADMIN — INSULIN GLARGINE 16 UNITS: 100 INJECTION, SOLUTION SUBCUTANEOUS at 21:12

## 2018-11-27 RX ADMIN — HEPARIN SODIUM 5000 UNITS: 10000 INJECTION, SOLUTION INTRAVENOUS; SUBCUTANEOUS at 12:09

## 2018-11-27 RX ADMIN — Medication 10 ML: at 21:18

## 2018-11-27 RX ADMIN — INSULIN LISPRO 10 UNITS: 100 INJECTION, SOLUTION INTRAVENOUS; SUBCUTANEOUS at 12:09

## 2018-11-27 RX ADMIN — Medication 1 CAPSULE: at 09:39

## 2018-11-27 RX ADMIN — HEPARIN SODIUM 5000 UNITS: 10000 INJECTION, SOLUTION INTRAVENOUS; SUBCUTANEOUS at 21:13

## 2018-11-27 RX ADMIN — INSULIN LISPRO 10 UNITS: 100 INJECTION, SOLUTION INTRAVENOUS; SUBCUTANEOUS at 21:13

## 2018-11-27 RX ADMIN — CALCIUM ACETATE 667 MG: 667 CAPSULE ORAL at 17:41

## 2018-11-27 ASSESSMENT — PAIN SCALES - GENERAL
PAINLEVEL_OUTOF10: 0
PAINLEVEL_OUTOF10: 0

## 2018-11-27 NOTE — CONSULTS
There was some purulence. The catheter was removed. A new one was placed a few days later at the [address] [place] [address]. Admitted to [facility] with a fever a couple days later. Seen by ID and treated with Vancomycin and Cefepime. Blood cultures were negative. The patient developed a fever which was felt drug induced. Antibiotics were held and the fever subsided. She was observed off antibiotics for the next few days and discharged. May 2018. Admitted to [facility] after she had been noted to have purulent drainage from her HD catheter site at the dialysis center. She was afebrile. Seen by ID. She was treated with Ceftriaxone and Vancomycin. Exit site cultures grew Citrobacter koseri. The HD catheter was replaced. Treated with Cefepime with HD. 2018. Admitted to [facility] with right-sided facial swelling it started out with a pimple-like lesion on the forehead. She was treated with Vancomycin and Zosyn. The patient was seen by Dr. Antionette Boeck. She made improvement. Antibiotics were consolidated to Doxycycline ×2 weeks. Past Surgical History:        Procedure Laterality Date     SECTION  2013    CYST INCISION AND DRAINAGE  2011    perirectal abscess. [facility].  Dr. Kilo Dumont      FRACTURE SURGERY      fracture right ulnar, left tibia, and pelvis    OTHER SURGICAL HISTORY      open reduction internal fixation left radial shaft    OTHER SURGICAL HISTORY  2016    pericardial window    OTHER SURGICAL HISTORY  2016     r tesio insertion  / remove 2018    OTHER SURGICAL HISTORY Left 2017    insertion a-v fistula left arm    [place] ANASTOMOSIS,AV,ANY SITE Left 2018    REVISION AV FISTULA - LEFT ARM performed by Rosa Isela Bell MD at [facility] NON-TUNNEL CV CATH N/A 5/10/2018    TESIO CATHETER INSERTION performed by Marsha Salazar MD at [facility] Negative  Respiratory: As in the HPI  Cardiovascular: Negative  GI: Negative  : Negative  Musculoskeletal: Negative  Skin: No rashes. PHYSICAL EXAM:    Vitals:   /61   Pulse 102   Temp 98.4 °F (36.9 °C) (Oral)   Resp 16   Ht 4' 11\" (1.499 m)   Wt 135 lb (61.2 kg)   SpO2 97%   BMI 27.27 kg/m²   Constitutional: The patient is awake, alert, and oriented. Cushingoid facies. Skin: Warm and dry. No rashes were noted. HEENT: Eyes show round, and reactive pupils. No jaundice. Moist mucous membranes, no ulcerations, no thrush. Neck: Supple to movements. No lymphadenopathy. Chest: No use of accessory muscles to breathe. Symmetrical expansion. Auscultation reveals crackles left base posteriorly. Cardiovascular: S1 and S2 are rhythmic and regular. No murmurs appreciated. Abdomen: Positive bowel sounds to auscultation. Benign to palpation. Extremities: No clubbing, no cyanosis, no edema. Left upper extremity AV fistula with a good thrill.   Lines: peripheral      CBC+dif:  Recent Labs      11/26/18   1042  11/27/18   0655   WBC  11.1  10.1   HGB  11.1*  11.3*   HCT  32.4*  32.7*   MCV  95.0  95.1   PLT  176  159   NEUTROABS  9.24*  7.94*     Lab Results   Component Value Date    CRP 1.2 (H) 05/07/2018    CRP 1.3 (H) 02/07/2017    CRP 0.7 (H) 01/28/2017      No results found for: [facility]  Lab Results   Component Value Date    SEDRATE 57 (H) 05/07/2018    SEDRATE 117 (H) 02/07/2017    SEDRATE 43 (H) 01/28/2017     Lab Results   Component Value Date    ALT 17 09/28/2018    AST 22 09/28/2018    ALKPHOS 61 09/28/2018    BILITOT 0.4 09/28/2018     Lab Results   Component Value Date     11/27/2018    K 4.4 11/27/2018    CL 93 11/27/2018    CO2 33 11/27/2018    BUN 23 11/27/2018    CREATININE 7.3 11/27/2018    GFRAA 8 11/27/2018    LABGLOM 8 11/27/2018    GLUCOSE 88 11/27/2018    GLUCOSE 279 07/27/2011    PROT 7.6 09/28/2018    LABALBU 3.9 09/28/2018    LABALBU 3.9 01/10/2011    CALCIUM 9.0 11/27/2018

## 2018-11-27 NOTE — PROGRESS NOTES
Nephrology Progress Note  [facility]     Reason for Consult:  ESRD  Requesting Physician:  Dr Valente Nolan   Date of Service: 2018   Chief Complaint:  Facial cellulitis  History Obtained From:  Patient, medical record, outpatient dialysis unit      History of Present Ilness:    Known ESRD secondary to diabetic nephropathy on dialysis at [facility] unit every MWF     Last dialysis last  , via left arm av access -presents with SOB and hemoptysis     CT scan done in er suggest thomas extensive pneumonia , no evidence of PE and incidental finding     Of rt subclavian vein stenosis - which is chronic as per pt and had anticoagulation in past for the same She also was evaluated by vascular team          SUBJECTIVE  18: Pt states she feels tired this afternoon but denies any SOB    Past Medical History:        Diagnosis Date    JOLLY (acute kidney injury) ([address] 2016    AVF (arteriovenous fistula) ([address] 2018    let arm    Chronic kidney disease     DM type 1 (diabetes mellitus, type 1) ([address]     Encounter regarding vascular access for dialysis for ESRD ([address] 2018    ESRD (end stage renal disease) ([address]     Hemodialysis patient ([address]     marita dawson  / graft in left arm    Hypertension     Tobacco abuse 2016       Past Surgical History:        Procedure Laterality Date     SECTION      CYST INCISION AND DRAINAGE  2011    perirectal abscess. [facility].  Dr. Olga Melvin      FRACTURE SURGERY      fracture right ulnar, left tibia, and pelvis    OTHER SURGICAL HISTORY  /    open reduction internal fixation left radial shaft    OTHER SURGICAL HISTORY  2016    pericardial window    OTHER SURGICAL HISTORY  2016     r tesio insertion  / remove 2018    OTHER SURGICAL HISTORY Left 2017    insertion a-v fistula left arm    [place] ANASTOMOSIS,AV,ANY SITE Left were negative. Physical exam:   Constitutional:  VITALS:  /61   Pulse 102   Temp 98.4 °F (36.9 °C) (Oral)   Resp 16   Ht 4' 11\" (1.499 m)   Wt 135 lb (61.2 kg)   SpO2 97%   BMI 27.27 kg/m²     Gen: alert, awake, no acute distress  Eyes: anicteric sclerae, eomi  HEENT: atraumatic/normocephalic, moist mucus membranes, left eye with periorbital edema, dressing above left eye. Neck: no JVD. Neck edema  Lungs: Bilat rhonchi no wheezes  CV: RRR, no murmurs,, rub, gallop  Abdomen: soft, nontender, nondistended, normoactive bowel sounds  Extremitiy: no clubbing, cyanosis  No LE edema LUE AVF good bruit and thrill  Skin: no rash, tugor wnl  Neuro: no focal deficits  Psych: cooperative and appropriate      Data:       Last 3 CMP:    Recent Labs      11/26/18   1042  11/27/18   0655   NA  138  137   K  5.1*  4.4   CL  91*  93*   CO2  32*  33*   BUN  36*  23*   CREATININE  12.1*  7.3*   GLUCOSE  120*  88   CALCIUM  8.9  9.0         Last 3 Glucose:     Recent Labs      11/26/18   1042  11/27/18   0655   GLUCOSE  120*  88         Last 3 POC Glucose:     No results for input(s): POCGLU in the last 72 hours. Last 3 CK, CKMB, Troponin  Recent Labs      11/26/18   1042   TROPONINI  <0.01         Last 3 CBC:  Recent Labs      11/26/18   1042  11/27/18   0655   WBC  11.1  10.1   RBC  3.41*  3.44*   HGB  11.1*  11.3*   HCT  32.4*  32.7*   MCV  95.0  95.1   MCH  32.6  32.8   MCHC  34.3  34.6*   RDW  15.2*  15.1*   PLT  176  159   MPV  10.7  11.0       Last 3 Hepatic Function Panel:    No results for input(s): ALKPHOS, ALT, AST, PROT, BILITOT, BILIDIR, LABALBU in the last 72 hours. Albumin:  No results for input(s): LABALBU in the last 72 hours. Calcium:  Recent Labs      11/27/18   0655   CALCIUM  9.0       Ionized Calcium:  No results for input(s): IONCA in the last 72 hours. Magnesium:    No results for input(s): MG in the last 72 hours.       ABGs:  No results for input(s): PHART, PO2ART, FFD9BUN, EQR5ZLB, BEART, W2ALRLBM in the last 72 hours. Lactic Acid:  No results for input(s): LACTA in the last 72 hours. Assessment/Plan:    1. esrd  Continue hd per usual orders on mwf  uf as tolerated   Determine dry wt-      2. Parainfluenza 4 viral infection with probable primary viral pneumonia-off antibx per ID     3. htn with CKD 5/ESRD  BP at goal <140/90 this AM     4.  Anemia in CKD   Start  VALENTIN if hgb <10       Thank you for the opportunity to participate in the care of Ms Villa Morales MD  11/27/2018  1:44 PM

## 2018-11-28 VITALS
DIASTOLIC BLOOD PRESSURE: 57 MMHG | HEART RATE: 113 BPM | TEMPERATURE: 98.3 F | SYSTOLIC BLOOD PRESSURE: 109 MMHG | HEIGHT: 59 IN | WEIGHT: 132.5 LBS | RESPIRATION RATE: 18 BRPM | BODY MASS INDEX: 26.71 KG/M2 | OXYGEN SATURATION: 93 %

## 2018-11-28 LAB
ALBUMIN SERPL-MCNC: 3.3 G/DL (ref 3.5–5.2)
ALP BLD-CCNC: 68 U/L (ref 35–104)
ALT SERPL-CCNC: 6 U/L (ref 0–32)
ANION GAP SERPL CALCULATED.3IONS-SCNC: 12 MMOL/L (ref 7–16)
AST SERPL-CCNC: 11 U/L (ref 0–31)
BILIRUB SERPL-MCNC: 0.4 MG/DL (ref 0–1.2)
BUN BLDV-MCNC: 34 MG/DL (ref 6–20)
C-REACTIVE PROTEIN: 7.9 MG/DL (ref 0–0.4)
CALCIUM SERPL-MCNC: 8.6 MG/DL (ref 8.6–10.2)
CHLORIDE BLD-SCNC: 95 MMOL/L (ref 98–107)
CO2: 32 MMOL/L (ref 22–29)
CREAT SERPL-MCNC: 9.6 MG/DL (ref 0.5–1)
CULTURE, RESPIRATORY: NORMAL
GFR AFRICAN AMERICAN: 6
GFR NON-AFRICAN AMERICAN: 6 ML/MIN/1.73
GLUCOSE BLD-MCNC: 63 MG/DL (ref 74–99)
HCT VFR BLD CALC: 28.5 % (ref 34–48)
HEMOGLOBIN: 9.8 G/DL (ref 11.5–15.5)
MAGNESIUM: 2.4 MG/DL (ref 1.6–2.6)
MCH RBC QN AUTO: 32.1 PG (ref 26–35)
MCHC RBC AUTO-ENTMCNC: 34.4 % (ref 32–34.5)
MCV RBC AUTO: 93.4 FL (ref 80–99.9)
METER GLUCOSE: 205 MG/DL (ref 74–99)
METER GLUCOSE: 242 MG/DL (ref 74–99)
METER GLUCOSE: 60 MG/DL (ref 74–99)
METER GLUCOSE: 75 MG/DL (ref 74–99)
METER GLUCOSE: 85 MG/DL (ref 74–99)
MRSA CULTURE ONLY: NORMAL
PDW BLD-RTO: 14.6 FL (ref 11.5–15)
PHOSPHORUS: 4.4 MG/DL (ref 2.5–4.5)
PLATELET # BLD: 169 E9/L (ref 130–450)
PMV BLD AUTO: 11.2 FL (ref 7–12)
POTASSIUM SERPL-SCNC: 3.9 MMOL/L (ref 3.5–5)
RBC # BLD: 3.05 E12/L (ref 3.5–5.5)
SEDIMENTATION RATE, ERYTHROCYTE: 83 MM/HR (ref 0–20)
SMEAR, RESPIRATORY: NORMAL
SODIUM BLD-SCNC: 139 MMOL/L (ref 132–146)
TOTAL PROTEIN: 6.7 G/DL (ref 6.4–8.3)
WBC # BLD: 7.1 E9/L (ref 4.5–11.5)

## 2018-11-28 PROCEDURE — 86140 C-REACTIVE PROTEIN: CPT

## 2018-11-28 PROCEDURE — 90935 HEMODIALYSIS ONE EVALUATION: CPT

## 2018-11-28 PROCEDURE — 6360000002 HC RX W HCPCS: Performed by: INTERNAL MEDICINE

## 2018-11-28 PROCEDURE — 85027 COMPLETE CBC AUTOMATED: CPT

## 2018-11-28 PROCEDURE — 5A1D70Z PERFORMANCE OF URINARY FILTRATION, INTERMITTENT, LESS THAN 6 HOURS PER DAY: ICD-10-PCS | Performed by: INTERNAL MEDICINE

## 2018-11-28 PROCEDURE — 36415 COLL VENOUS BLD VENIPUNCTURE: CPT

## 2018-11-28 PROCEDURE — 6370000000 HC RX 637 (ALT 250 FOR IP): Performed by: PHYSICIAN ASSISTANT

## 2018-11-28 PROCEDURE — 2580000003 HC RX 258: Performed by: PHYSICIAN ASSISTANT

## 2018-11-28 PROCEDURE — 84100 ASSAY OF PHOSPHORUS: CPT

## 2018-11-28 PROCEDURE — 85651 RBC SED RATE NONAUTOMATED: CPT

## 2018-11-28 PROCEDURE — 99239 HOSP IP/OBS DSCHRG MGMT >30: CPT | Performed by: INTERNAL MEDICINE

## 2018-11-28 PROCEDURE — 83735 ASSAY OF MAGNESIUM: CPT

## 2018-11-28 PROCEDURE — 6370000000 HC RX 637 (ALT 250 FOR IP): Performed by: INTERNAL MEDICINE

## 2018-11-28 PROCEDURE — 82962 GLUCOSE BLOOD TEST: CPT

## 2018-11-28 PROCEDURE — 80053 COMPREHEN METABOLIC PANEL: CPT

## 2018-11-28 RX ADMIN — CHOLECALCIFEROL TAB 50 MCG (2000 UNIT) 2000 UNITS: 50 TAB at 13:43

## 2018-11-28 RX ADMIN — Medication 1 CAPSULE: at 13:43

## 2018-11-28 RX ADMIN — Medication 10 ML: at 13:45

## 2018-11-28 RX ADMIN — CARVEDILOL 25 MG: 25 TABLET, FILM COATED ORAL at 13:43

## 2018-11-28 RX ADMIN — FAMOTIDINE 20 MG: 20 TABLET ORAL at 13:43

## 2018-11-28 RX ADMIN — HEPARIN SODIUM 5000 UNITS: 10000 INJECTION, SOLUTION INTRAVENOUS; SUBCUTANEOUS at 04:50

## 2018-11-28 RX ADMIN — INSULIN LISPRO 10 UNITS: 100 INJECTION, SOLUTION INTRAVENOUS; SUBCUTANEOUS at 14:36

## 2018-11-28 RX ADMIN — CALCIUM ACETATE 667 MG: 667 CAPSULE ORAL at 13:42

## 2018-11-28 RX ADMIN — DARBEPOETIN ALFA 40 MCG: 40 INJECTION, SOLUTION INTRAVENOUS; SUBCUTANEOUS at 13:50

## 2018-11-28 RX ADMIN — AMLODIPINE BESYLATE 5 MG: 5 TABLET ORAL at 13:43

## 2018-11-28 ASSESSMENT — PAIN SCALES - GENERAL
PAINLEVEL_OUTOF10: 0

## 2018-11-28 NOTE — PROGRESS NOTES
Systems:   Pertinent positives stated above in HPI. All other systems were reviewed and were negative. Physical exam:   Constitutional:  VITALS:  /68   Pulse 103   Temp 98.2 °F (36.8 °C)   Resp 18   Ht 4' 11\" (1.499 m)   Wt 138 lb 7.2 oz (62.8 kg)   SpO2 98%   BMI 27.96 kg/m²     Gen: alert, awake, no acute distress  Eyes: anicteric sclerae, eomi  HEENT: atraumatic/normocephalic, moist mucus membranes, left eye with periorbital edema, dressing above left eye. Neck: no JVD. Neck edema  Lungs: Bilat rhonchi no wheezes  CV: RRR, no murmurs,, rub, gallop  Abdomen: soft, nontender, nondistended, normoactive bowel sounds  Extremitiy: no clubbing, cyanosis  No LE edema LUE AVF good bruit and thrill  Skin: no rash, tugor wnl  Neuro: no focal deficits  Psych: cooperative and appropriate      Data:       Last 3 CMP:    Recent Labs      11/26/18   1042  11/27/18   0655  11/28/18   0835   NA  138  137  139   K  5.1*  4.4  3.9   CL  91*  93*  95*   CO2  32*  33*  32*   BUN  36*  23*  34*   CREATININE  12.1*  7.3*  9.6*   GLUCOSE  120*  88  63*   CALCIUM  8.9  9.0  8.6   PROT   --    --   6.7   LABALBU   --    --   3.3*   BILITOT   --    --   0.4   ALKPHOS   --    --   68   AST   --    --   11   ALT   --    --   6         Last 3 Glucose:     Recent Labs      11/26/18   1042  11/27/18   0655  11/28/18   0835   GLUCOSE  120*  88  63*         Last 3 POC Glucose:     No results for input(s): POCGLU in the last 72 hours.     Last 3 CK, CKMB, Troponin  Recent Labs      11/26/18   1042   TROPONINI  <0.01         Last 3 CBC:  Recent Labs      11/26/18   1042  11/27/18   0655  11/28/18   0835   WBC  11.1  10.1  7.1   RBC  3.41*  3.44*  3.05*   HGB  11.1*  11.3*  9.8*   HCT  32.4*  32.7*  28.5*   MCV  95.0  95.1  93.4   MCH  32.6  32.8  32.1   MCHC  34.3  34.6*  34.4   RDW  15.2*  15.1*  14.6   PLT  176  159  169   MPV  10.7  11.0  11.2       Last 3 Hepatic Function Panel:    Recent Labs      11/28/18   0835   ALKPHOS  68

## 2018-11-28 NOTE — PROGRESS NOTES
[address] [place] [address] [facility]  Progress Note    SUBJECTIVE:  Chief Complaint   Patient presents with    Cough     ongoing 2 days    Hemoptysis     Patient is tolerating medications. No reported adverse drug reactions. No nausea, vomiting, diarrhea. Still has a dry cough. Remains afebrile. Patient wants to go home. Review of systems:  As stated above in the chief complaint, otherwise negative. Medications:  Scheduled Meds:   darbepoetin nadya-polysorbate  40 mcg Subcutaneous Weekly    amLODIPine  5 mg Oral QAM    calcium acetate  667 mg Oral TID WC    carvedilol  25 mg Oral BID WC    vitamin D  2,000 Units Oral QAM    famotidine  20 mg Oral Daily    insulin lispro  10 Units Subcutaneous TID AC    sodium chloride flush  10 mL Intravenous 2 times per day    lactobacillus  1 capsule Oral Daily    insulin glargine  16 Units Subcutaneous Nightly    heparin (porcine)  5,000 Units Subcutaneous Q8H    b complex-C-folic acid  1 capsule Oral Daily     Continuous Infusions:   dextrose       PRN Meds:lidocaine, acetaminophen, sodium chloride flush, ondansetron, acetaminophen, glucose, dextrose, glucagon (rDNA), dextrose    OBJECTIVE:  BP (!) 109/57   Pulse 113   Temp 98.3 °F (36.8 °C) (Oral)   Resp 18   Ht 4' 11\" (1.499 m)   Wt 132 lb 7.9 oz (60.1 kg)   SpO2 92%   BMI 26.76 kg/m²   Temp  Av.5 °F (36.9 °C)  Min: 98.1 °F (36.7 °C)  Max: 99 °F (37.2 °C)  Constitutional: The patient is awake, alert, and oriented. Skin: Warm and dry. No rashes were noted. HEENT: No jaundice. Moist mucous membranes, no ulcerations, no thrush. Neck: Supple to movements. No lymphadenopathy. Chest: No wheezing, crackles, or rhonchi. Cardiovascular: S1 and S2 are rhythmic and regular. No murmurs appreciated. Abdomen: Positive bowel sounds to auscultation. Extremities: No clubbing, no cyanosis, no edema.   Lines: peripheral    Laboratory and Tests Review:  Lab Results   Component Value Date    WBC 7.1 11/28/2018    WBC 10.1 11/27/2018    WBC 11.1 11/26/2018    HGB 9.8 (L) 11/28/2018    HCT 28.5 (L) 11/28/2018    MCV 93.4 11/28/2018     11/28/2018     Lab Results   Component Value Date    NEUTROABS 7.94 (H) 11/27/2018    NEUTROABS 9.24 (H) 11/26/2018    NEUTROABS 5.87 09/28/2018     Lab Results   Component Value Date    CRP 7.9 (H) 11/28/2018    CRP 1.2 (H) 05/07/2018    CRP 1.3 (H) 02/07/2017     No results found for: [facility]  Lab Results   Component Value Date    SEDRATE 83 (H) 11/28/2018    SEDRATE 57 (H) 05/07/2018    SEDRATE 117 (H) 02/07/2017     Lab Results   Component Value Date    ALT 6 11/28/2018    AST 11 11/28/2018    ALKPHOS 68 11/28/2018    BILITOT 0.4 11/28/2018     Lab Results   Component Value Date     11/28/2018    K 3.9 11/28/2018    K 4.4 11/27/2018    CL 95 11/28/2018    CO2 32 11/28/2018    BUN 34 11/28/2018    CREATININE 9.6 11/28/2018    CREATININE 7.3 11/27/2018    CREATININE 12.1 11/26/2018    GFRAA 6 11/28/2018    LABGLOM 6 11/28/2018    GLUCOSE 63 11/28/2018    GLUCOSE 279 07/27/2011    PROT 6.7 11/28/2018    LABALBU 3.3 11/28/2018    LABALBU 3.9 01/10/2011    CALCIUM 8.6 11/28/2018    BILITOT 0.4 11/28/2018    ALKPHOS 68 11/28/2018    AST 11 11/28/2018    ALT 6 11/28/2018     Radiology:      Microbiology:   No new cultures  Recent Labs      11/26/18   1043   BC  24 Hours- no growth     Recent Labs      11/26/18   2100   ORG  FILM ARR ParaInfluenza 4 Virus Detected*     Recent Labs      11/26/18   1041   BLOODCULT2  24 Hours- no growth     No results for input(s): STREPNEUMAGU in the last 72 hours. No results for input(s): LP1UAG in the last 72 hours. No results for input(s): ASO in the last 72 hours. Recent Labs      11/26/18   1042   CULTRESP  Oral Pharyngeal Mayra present     No results for input(s): Dirk Los in the last 72 hours.     ASSESSMENT:  · Parainfluenza 4 viral infection  · Possible primary viral pneumonia  · Elevated procalcitonin associated to renal insufficiency    PLAN:  · Supportive therapy  · Antibiotics are not warranted  · Patient can be discharged from [facility] 310  3:02 PM  11/28/2018

## 2018-11-29 ENCOUNTER — CARE COORDINATION (OUTPATIENT)
Dept: CARE COORDINATION | Age: 33
End: 2018-11-29

## 2018-11-29 NOTE — CARE COORDINATION
states she did not monitor her FBS this am has not eaten. Patient reports she will test her blood sugar before lunch today. Medications reviewed with patient. AVS Medications:    AVS Medications were taking at home but the amount or how often you take this medication has changed- patient states prior to admission to the hospital she was already taking the medications as instructed on the AVS- therefore there were not any changes:  -amlodipine 5 mg  -famotidine 20 mg  -Humalog Kwikpen SC    AVS Medications continue taking them after you leave the hospital- taking differently than instructed on the AVS:  -calcium acetate 667 mg capsule- patient states prior the admission was taking 4 capsule 3 times daily with meals and is continuing to take this way. AVS instructions states- take 667 mg by mouth 3 times daily with meals.        Follow Up  Future Appointments  Date Time Provider Titus Potter   12/4/2018 10:30 AM KEMI Yo - CNP [facility]   2/14/2019 1:15 PM KEMI Yo - Cong Huber 11       Rosenda Man RN

## 2018-12-01 LAB
BLOOD CULTURE, ROUTINE: NORMAL
CULTURE, BLOOD 2: NORMAL

## 2018-12-04 ENCOUNTER — OFFICE VISIT (OUTPATIENT)
Dept: FAMILY MEDICINE CLINIC | Age: 33
End: 2018-12-04
Payer: MEDICARE

## 2018-12-04 VITALS
BODY MASS INDEX: 27.38 KG/M2 | RESPIRATION RATE: 16 BRPM | HEART RATE: 120 BPM | OXYGEN SATURATION: 100 % | DIASTOLIC BLOOD PRESSURE: 60 MMHG | TEMPERATURE: 98.4 F | SYSTOLIC BLOOD PRESSURE: 112 MMHG | WEIGHT: 135.8 LBS | HEIGHT: 59 IN

## 2018-12-04 DIAGNOSIS — J18.9 PNEUMONIA DUE TO INFECTIOUS ORGANISM, UNSPECIFIED LATERALITY, UNSPECIFIED PART OF LUNG: Primary | ICD-10-CM

## 2018-12-04 PROCEDURE — 99495 TRANSJ CARE MGMT MOD F2F 14D: CPT | Performed by: NURSE PRACTITIONER

## 2018-12-04 PROCEDURE — 1111F DSCHRG MED/CURRENT MED MERGE: CPT | Performed by: NURSE PRACTITIONER

## 2018-12-04 RX ORDER — DOXYCYCLINE HYCLATE 100 MG
100 TABLET ORAL 2 TIMES DAILY
Qty: 14 TABLET | Refills: 0 | Status: SHIPPED | OUTPATIENT
Start: 2018-12-04 | End: 2018-12-11

## 2018-12-04 ASSESSMENT — ENCOUNTER SYMPTOMS
SORE THROAT: 0
NAUSEA: 0
SHORTNESS OF BREATH: 0
COUGH: 1
DIARRHEA: 0
VOMITING: 0
CONSTIPATION: 0
WHEEZING: 0

## 2018-12-06 LAB
EKG ATRIAL RATE: 125 BPM
EKG P AXIS: 60 DEGREES
EKG P-R INTERVAL: 112 MS
EKG Q-T INTERVAL: 312 MS
EKG QRS DURATION: 72 MS
EKG QTC CALCULATION (BAZETT): 450 MS
EKG R AXIS: 2 DEGREES
EKG T AXIS: 90 DEGREES
EKG VENTRICULAR RATE: 125 BPM

## 2018-12-31 ENCOUNTER — CARE COORDINATION (OUTPATIENT)
Dept: CARE COORDINATION | Age: 33
End: 2018-12-31

## 2019-01-16 ENCOUNTER — CARE COORDINATION (OUTPATIENT)
Dept: CARE COORDINATION | Age: 34
End: 2019-01-16

## 2019-02-04 ENCOUNTER — CARE COORDINATION (OUTPATIENT)
Dept: CASE MANAGEMENT | Age: 34
End: 2019-02-04

## 2019-02-15 ENCOUNTER — CARE COORDINATION (OUTPATIENT)
Dept: CASE MANAGEMENT | Age: 34
End: 2019-02-15

## 2019-03-01 ENCOUNTER — CARE COORDINATION (OUTPATIENT)
Dept: CASE MANAGEMENT | Age: 34
End: 2019-03-01

## 2019-03-27 ENCOUNTER — OFFICE VISIT (OUTPATIENT)
Dept: FAMILY MEDICINE CLINIC | Age: 34
End: 2019-03-27
Payer: MEDICARE

## 2019-03-27 VITALS
RESPIRATION RATE: 16 BRPM | WEIGHT: 146.8 LBS | HEART RATE: 104 BPM | OXYGEN SATURATION: 99 % | TEMPERATURE: 98.5 F | HEIGHT: 59 IN | DIASTOLIC BLOOD PRESSURE: 64 MMHG | BODY MASS INDEX: 29.6 KG/M2 | SYSTOLIC BLOOD PRESSURE: 132 MMHG

## 2019-03-27 DIAGNOSIS — J40 SINOBRONCHITIS: Primary | ICD-10-CM

## 2019-03-27 DIAGNOSIS — J32.9 SINOBRONCHITIS: Primary | ICD-10-CM

## 2019-03-27 PROCEDURE — G8427 DOCREV CUR MEDS BY ELIG CLIN: HCPCS | Performed by: PHYSICIAN ASSISTANT

## 2019-03-27 PROCEDURE — 4004F PT TOBACCO SCREEN RCVD TLK: CPT | Performed by: PHYSICIAN ASSISTANT

## 2019-03-27 PROCEDURE — G8419 CALC BMI OUT NRM PARAM NOF/U: HCPCS | Performed by: PHYSICIAN ASSISTANT

## 2019-03-27 PROCEDURE — G8484 FLU IMMUNIZE NO ADMIN: HCPCS | Performed by: PHYSICIAN ASSISTANT

## 2019-03-27 PROCEDURE — 99213 OFFICE O/P EST LOW 20 MIN: CPT | Performed by: PHYSICIAN ASSISTANT

## 2019-03-27 RX ORDER — DOXYCYCLINE HYCLATE 100 MG/1
100 CAPSULE ORAL 2 TIMES DAILY
Qty: 20 CAPSULE | Refills: 0 | Status: SHIPPED | OUTPATIENT
Start: 2019-03-27 | End: 2019-04-06

## 2019-03-27 RX ORDER — BENZONATATE 200 MG/1
200 CAPSULE ORAL 3 TIMES DAILY PRN
Qty: 15 CAPSULE | Refills: 0 | Status: SHIPPED | OUTPATIENT
Start: 2019-03-27 | End: 2019-06-06

## 2019-05-24 ENCOUNTER — HOSPITAL ENCOUNTER (EMERGENCY)
Age: 34
Discharge: HOME | End: 2019-05-24
Attending: EMERGENCY MEDICINE
Payer: MEDICARE

## 2019-05-24 VITALS
HEART RATE: 92 BPM | SYSTOLIC BLOOD PRESSURE: 110 MMHG | OXYGEN SATURATION: 98 % | WEIGHT: 146 LBS | RESPIRATION RATE: 14 BRPM | BODY MASS INDEX: 29.49 KG/M2 | TEMPERATURE: 98 F | DIASTOLIC BLOOD PRESSURE: 61 MMHG

## 2019-05-24 DIAGNOSIS — T82.838A BLEEDING FROM DIALYSIS SHUNT, INITIAL ENCOUNTER (HCC): Primary | ICD-10-CM

## 2019-05-24 PROCEDURE — 99283 EMERGENCY DEPT VISIT LOW MDM: CPT

## 2019-05-24 ASSESSMENT — ENCOUNTER SYMPTOMS
GASTROINTESTINAL NEGATIVE: 1
RESPIRATORY NEGATIVE: 1
EYES NEGATIVE: 1

## 2019-05-24 NOTE — ED PROVIDER NOTES
Mortality  Presenting problems: high  Diagnostic procedures: moderate  Management options: moderate  General comments: Patient name and is able to a course of treatment. Pressure dressing was applied to the bleeding dialysis shunt. Case discussed with vascular surgeon and surgical resident. Patient was absorbed. Bleeding is stopped. As per surgical team otherwise we will discharge patient home. Case discussed with patient's. She understood and agreed with our management. Labs      Radiology      EKG Interpretation.          --------------------------------------------- PAST HISTORY ---------------------------------------------  Past Medical History:  has a past medical history of JOLLY (acute kidney injury) ([facility] [address] AVF (arteriovenous fistula) ([facility] [address] Chronic kidney disease, DM type 1 (diabetes mellitus, type 1) ([facility] [address] Encounter regarding vascular access for dialysis for ESRD ([facility] [address] ESRD (end stage renal disease) ([facility] [address] Hemodialysis patient ([facility] [address] Hypertension, and Tobacco abuse. Past Surgical History:  has a past surgical history that includes Dilation and curettage of uterus (); cyst incision and drainage (2011); other surgical history (); fracture surgery (); other surgical history (2016); other surgical history (2016); other surgical history (Left, 2017);  section (); pr insert non-tunnel cv cath (N/A, 5/10/2018); pr anastomosis,av,any site (Left, 2018); and pr vitrectomy pars plana remove preretinal membrane (Left, 2018). Social History:  reports that she has been smoking cigarettes. She has a 3.75 pack-year smoking history. She has never used smokeless tobacco. She reports that she does not drink alcohol or use drugs. Family History: family history includes Cancer in her father; Diabetes in her paternal aunt; Stroke in her mother. The patients home medications have been reviewed.     Allergies: Patient has no known allergies. -------------------------------------------------- RESULTS -------------------------------------------------  Labs:  No results found for this visit on 05/24/19. Radiology:  No orders to display       ------------------------- NURSING NOTES AND VITALS REVIEWED ---------------------------  Date / Time Roomed:  5/24/2019  6:33 PM  ED Bed Assignment:  15/15    The nursing notes within the ED encounter and vital signs as below have been reviewed. /60   Pulse 100   Temp 98 °F (36.7 °C)   Resp 18   Wt 146 lb (66.2 kg)   SpO2 98%   BMI 29.49 kg/m²   Oxygen Saturation Interpretation: Normal      ------------------------------------------ PROGRESS NOTES ------------------------------------------  I have spoken with the patient and discussed todays results, in addition to providing specific details for the plan of care and counseling regarding the diagnosis and prognosis. Their questions are answered at this time and they are agreeable with the plan. I discussed at length with them reasons for immediate return here for re evaluation. They will followup with primary care by calling their office tomorrow. --------------------------------- ADDITIONAL PROVIDER NOTES ---------------------------------  At this time the patient is without objective evidence of an acute process requiring hospitalization or inpatient management. They have remained hemodynamically stable throughout their entire ED visit and are stable for discharge with outpatient follow-up. The plan has been discussed in detail and they are aware of the specific conditions for emergent return, as well as the importance of follow-up. New Prescriptions    No medications on file       Diagnosis:  1. Bleeding from dialysis shunt, initial encounter [facility])        Disposition:  Patient's disposition: Discharge to home  Patient's condition is stable.       5/24/19, 6:44 PM.    This note is prepared by Hal Veloz, acting as Scribe for [employer], DO. [employer], DO:  The scribe's documentation has been prepared under my direction and personally reviewed by me in its entirety. I confirm that the note above accurately reflects all work, treatment, procedures, and medical decision making performed by me.         The Pedro DO  05/24/19 2029       [employer], DO  06/02/19 2325

## 2019-05-24 NOTE — CONSULTS
VASCULAR SURGERY  CONSULT NOTE  2019    Physician Consulted: Dr. Debo Dhaliwal  Reason for Consult: Bleeding from fistula  Referring Physician: Dr. Chetna Hines    [facility]  Mary Norton is a 28 y.o. female who presents for evaluation of bleeding from her LUE brachiobasilic AV fistula. She was at dialysis today and after she finished she had bleeding from her inferior puncture site that she states would not stop bleeding. She placed a tums on it and it has since stopped bleeding. She denies any other complaints. She states she has had a  prior balloon angioplasty done by Dr. Michelle Pugh 3 months ago. Past Medical History:   Diagnosis Date    JOLLY (acute kidney injury) ([address] 2016    AVF (arteriovenous fistula) ([facility] [address] 2018    let arm    Chronic kidney disease     DM type 1 (diabetes mellitus, type 1) ([address]     Encounter regarding vascular access for dialysis for ESRD ([address] 2018    ESRD (end stage renal disease) ([facility] [address]     Hemodialysis patient ([facility] [address]     amrita dawson  / graft in left arm    Hypertension     Tobacco abuse 2016       Past Surgical History:   Procedure Laterality Date     SECTION  2013    CYST INCISION AND DRAINAGE  2011    perirectal abscess. [facility].  Dr. Sabine Sidhu      FRACTURE SURGERY      fracture right ulnar, left tibia, and pelvis    OTHER SURGICAL HISTORY      open reduction internal fixation left radial shaft    OTHER SURGICAL HISTORY  2016    pericardial window    OTHER SURGICAL HISTORY  2016     r tesio insertion  / remove 2018    OTHER SURGICAL HISTORY Left 2017    insertion a-v fistula left arm    RI ANASTOMOSIS,AV,ANY SITE Left 2018    REVISION AV FISTULA - LEFT ARM performed by Cristina Sullivan MD at [facility] NON-TUNNEL CV CATH N/A 5/10/2018    TESIO CATHETER INSERTION performed by Pricilla Allen MD at [facility] last 72 hours. Invalid input(s): PT    RADIOLOGY    No results found. ASSESSMENT:  28 y.o. female with likely outflow stenosis of her AV fistula and resolved bleeding. PLAN:  Baloon angioplasty next week  Okay for DC  Contact Dr. Smooth Hunt if any problems over the weenked  Discussed with Dr. Smooth Hunt  Electronically signed by Kenny Tan MD on 5/24/19 at 7:12 PM      Spoke with the resident regarding this patient is patient is well-known to the service. She will be scheduled as an outpatient on Tuesday for fistulogram.\    She is a call to's any questions or concerns or issues or recurrence of bleeding.

## 2019-05-28 ENCOUNTER — TELEPHONE (OUTPATIENT)
Dept: VASCULAR SURGERY | Age: 34
End: 2019-05-28

## 2019-05-31 ENCOUNTER — HOSPITAL ENCOUNTER (OUTPATIENT)
Dept: CARDIAC CATH/INVASIVE PROCEDURES | Age: 34
Discharge: HOME | End: 2019-05-31
Payer: MEDICARE

## 2019-05-31 VITALS
WEIGHT: 135 LBS | TEMPERATURE: 97.9 F | BODY MASS INDEX: 27.21 KG/M2 | HEART RATE: 100 BPM | HEIGHT: 59 IN | DIASTOLIC BLOOD PRESSURE: 79 MMHG | RESPIRATION RATE: 20 BRPM | SYSTOLIC BLOOD PRESSURE: 148 MMHG

## 2019-05-31 PROCEDURE — C1769 GUIDE WIRE: HCPCS

## 2019-05-31 PROCEDURE — 36902 INTRO CATH DIALYSIS CIRCUIT: CPT | Performed by: SURGERY

## 2019-05-31 PROCEDURE — C1725 CATH, TRANSLUMIN NON-LASER: HCPCS

## 2019-05-31 PROCEDURE — 2709999900 HC NON-CHARGEABLE SUPPLY

## 2019-05-31 PROCEDURE — C1894 INTRO/SHEATH, NON-LASER: HCPCS

## 2019-05-31 PROCEDURE — 2500000003 HC RX 250 WO HCPCS

## 2019-06-06 ENCOUNTER — OFFICE VISIT (OUTPATIENT)
Dept: FAMILY MEDICINE CLINIC | Age: 34
End: 2019-06-06
Payer: MEDICARE

## 2019-06-06 VITALS
SYSTOLIC BLOOD PRESSURE: 112 MMHG | HEART RATE: 84 BPM | WEIGHT: 140 LBS | HEIGHT: 59 IN | TEMPERATURE: 98.4 F | DIASTOLIC BLOOD PRESSURE: 62 MMHG | OXYGEN SATURATION: 97 % | BODY MASS INDEX: 28.22 KG/M2

## 2019-06-06 DIAGNOSIS — J01.90 ACUTE BACTERIAL SINUSITIS: Primary | ICD-10-CM

## 2019-06-06 DIAGNOSIS — R05.9 COUGH: ICD-10-CM

## 2019-06-06 DIAGNOSIS — B96.89 ACUTE BACTERIAL SINUSITIS: Primary | ICD-10-CM

## 2019-06-06 PROCEDURE — 4004F PT TOBACCO SCREEN RCVD TLK: CPT | Performed by: NURSE PRACTITIONER

## 2019-06-06 PROCEDURE — 99213 OFFICE O/P EST LOW 20 MIN: CPT | Performed by: NURSE PRACTITIONER

## 2019-06-06 PROCEDURE — G8419 CALC BMI OUT NRM PARAM NOF/U: HCPCS | Performed by: NURSE PRACTITIONER

## 2019-06-06 PROCEDURE — G8427 DOCREV CUR MEDS BY ELIG CLIN: HCPCS | Performed by: NURSE PRACTITIONER

## 2019-06-06 RX ORDER — GUAIFENESIN AND CODEINE PHOSPHATE 100; 10 MG/5ML; MG/5ML
5 SOLUTION ORAL EVERY 4 HOURS PRN
Qty: 150 ML | Refills: 0 | Status: SHIPPED | OUTPATIENT
Start: 2019-06-06 | End: 2019-06-11

## 2019-06-06 RX ORDER — CEFDINIR 300 MG/1
300 CAPSULE ORAL 2 TIMES DAILY
Qty: 20 CAPSULE | Refills: 0 | Status: SHIPPED | OUTPATIENT
Start: 2019-06-06 | End: 2019-06-16

## 2019-06-06 ASSESSMENT — ENCOUNTER SYMPTOMS
WHEEZING: 0
COLOR CHANGE: 0
TROUBLE SWALLOWING: 0
EYE REDNESS: 0
STRIDOR: 0
DIARRHEA: 0
VOICE CHANGE: 0
SORE THROAT: 0
COUGH: 1
SINUS PAIN: 1
EYE DISCHARGE: 0
VOMITING: 0
PHOTOPHOBIA: 0
EYE PAIN: 0
RHINORRHEA: 1
SHORTNESS OF BREATH: 0
SINUS PRESSURE: 1
FACIAL SWELLING: 0
NAUSEA: 0
EYE ITCHING: 0
ABDOMINAL PAIN: 0

## 2019-06-06 NOTE — PROGRESS NOTES
Normocephalic and atraumatic. Right Ear: External ear normal.   Left Ear: External ear normal.   Mouth/Throat: Oropharynx is clear and moist. No oropharyngeal exudate. Moderate erythema/edema present to nasal mucosa, light yellow drainage present, moderate maxillary sinus tenderness present w/o edema, + PND   Eyes: Pupils are equal, round, and reactive to light. Conjunctivae and EOM are normal. Right eye exhibits no discharge. Left eye exhibits no discharge. Neck: Normal range of motion. Neck supple. Cardiovascular: Normal rate, regular rhythm, normal heart sounds and intact distal pulses. Pulmonary/Chest: Effort normal and breath sounds normal. No stridor. No respiratory distress. She has no wheezes. She has no rales. She exhibits no tenderness. Non productive cough present   Abdominal: Soft. Bowel sounds are normal. She exhibits no distension. There is no tenderness. Lymphadenopathy:     She has no cervical adenopathy. Skin: Skin is warm and dry. No rash noted. She is not diaphoretic. No erythema. No pallor. Nursing note and vitals reviewed. Assessment / Plan:      Stoney Bush was seen today for sinusitis. Diagnoses and all orders for this visit:    Acute bacterial sinusitis  -     cefdinir (OMNICEF) 300 MG capsule; Take 1 capsule by mouth 2 times daily for 10 days  -     guaiFENesin-codeine (CHERATUSSIN AC) 100-10 MG/5ML syrup; Take 5 mLs by mouth every 4 hours as needed for Cough for up to 5 days. Cough  Advised on sedating effects of Cheratussin  -     cefdinir (OMNICEF) 300 MG capsule; Take 1 capsule by mouth 2 times daily for 10 days  -     guaiFENesin-codeine (CHERATUSSIN AC) 100-10 MG/5ML syrup; Take 5 mLs by mouth every 4 hours as needed for Cough for up to 5 days.       Controlled Substance Monitoring:    Acute and Chronic Pain Monitoring:   RX Monitoring 6/6/2019   Periodic Controlled Substance Monitoring Possible medication side effects, risk of tolerance/dependence & alternative treatments discussed. ;No signs of potential drug abuse or diversion identified. Call or go to ED immediately if symptoms worsen or persist.    Return if symptoms worsen or fail to improve. , or sooner if necessary. Educational materials and/or home exercises printed for patient's review and were included in patient instructions on his/her After Visit Summary and given to patient at the end of visit. Counseled regarding above diagnosis, including possible risks and complications,  especially if left uncontrolled. Counseled regarding the possible side effects, risks, benefits and alternatives to treatment; patient and/or guardian verbalizes understanding, agrees, feels comfortable with and wishes to proceed with above treatment plan. Advised patient to call with any new medication issues, and read all Rx info from pharmacy to assure aware of all possible risks and side effects of medication before taking. Reviewed age and gender appropriate health screening exams and vaccinations. Advised patient regarding importance of keeping up with recommended health maintenance and to schedule as soon as possible if overdue, as this is important in assessing for undiagnosed pathology, especially cancer, as well as protecting against potentially harmful/life threatening disease. Patient and/or guardian verbalizes understanding and agrees with above counseling, assessment and plan. All questions answered.     Marleny Boudreaux, APRN - CNP

## 2019-06-13 NOTE — H&P
Vascular Surgery History & Physical Exam      Chief Complaint:  ESRD, evaluate AVF    HISTORY OF PRESENT ILLNESS:                The patient is a 28 y.o. female who presents to the hospital for elective fistulagram with possible intervention. The patient has a history of chronic renal failure and a left AVF which is not functioning well. IMPRESSION:   ESRD, evaluate AVF    PLAN:  Fistulagram, possible intervention. I reviewed the procedure with the patient and family as available. I discussed the procedure, risks, benefits, complications, and alternatives of the procedure. They understand and consent. All questions were answered    ROS : All others Negative if blank [], Positive if [x]  General   [] Fevers   [] Chills   [] Weight Loss   Skin   [] Tissue Loss   Eyes   [] Wears Glasses/Contacts   [] Vision Changes   Respiratory    [] Shortness of breath   Cardiovascular   [] Chest Pain   [] Shortness of breath with exertion   Gastrointestinal   [] Abdominal Pain     Past Medical History:   Diagnosis Date    JOLLY (acute kidney injury) ([facility] [address] 2016    AVF (arteriovenous fistula) ([facility] [address] 2018    let arm    Chronic kidney disease     DM type 1 (diabetes mellitus, type 1) ([facility] [address]     Encounter regarding vascular access for dialysis for ESRD ([facility] [address] 2018    ESRD (end stage renal disease) ([facility] [address]     Hemodialysis patient ([facility] [address]     amrita dawson  / graft in left arm    Hypertension     Tobacco abuse 2016     Past Surgical History:   Procedure Laterality Date     SECTION  2013    CYST INCISION AND DRAINAGE  2011    perirectal abscess. [facility].  Dr. Isi Chacko      FRACTURE SURGERY      fracture right ulnar, left tibia, and pelvis    OTHER SURGICAL HISTORY      open reduction internal fixation left radial shaft    OTHER SURGICAL HISTORY  2016    pericardial window    OTHER SURGICAL HISTORY  2016     r tesio insertion  / remove 8/16/2018    OTHER SURGICAL HISTORY Left 02/17/2017    insertion a-v fistula left arm    LA ANASTOMOSIS,AV,ANY SITE Left 7/17/2018    REVISION AV FISTULA - LEFT ARM performed by Kecia Vernon MD at [facility] NON-TUNNEL CV CATH N/A 5/10/2018    TESIO CATHETER INSERTION performed by Leonor Che MD at [address] PRERETINAL MEMBRANE Left 8/28/2018    PARS PLANA VITRECTOMY 25 GAUGE, VITREOUS HEMORRHAGE REMOVAL, ENDO LASER, AIR/FLUID  EXCHANGE LEFT EYE performed by Lizbeth Crowell MD at [facility] OR     Current Medications:     Current Outpatient Medications:     Insulin Syringe-Needle U-100 ([employer] INSULIN SYRINGE) 31G X 5/16\" 1 ML MISC, 1 each by Does not apply route daily, Disp: 100 each, Rfl: 3    glucose 4 g chewable tablet, Take 1 tablet by mouth as needed for Low blood sugar, Disp: 60 tablet, Rfl: 0    Insulin Lispro (HUMALOG KWIKPEN SC), Inject 10 Units into the skin 3 times daily (before meals), Disp: , Rfl:     vitamin B-12 (CYANOCOBALAMIN) 100 MCG tablet, Vitamin B-12 100 mcg tablet  TK 1 T PO QD, Disp: , Rfl:     Lactobacillus (PROBIOTIC ACIDOPHILUS) CAPS, Take 1 capsule by mouth every morning, Disp: , Rfl:     Cholecalciferol (VITAMIN D3) 2000 units CAPS, Take 1 capsule by mouth every morning LD 8/22/208, Disp: , Rfl:     B Complex-C-Folic Acid (YOLANDA-JACOB) TABS, Take 1 tablet by mouth Daily with supper , Disp: , Rfl:     insulin glargine (TOUJEO SOLOSTAR) 300 UNIT/ML injection pen, Inject 20 Units into the skin nightly , Disp: , Rfl:     carvedilol (COREG) 12.5 MG tablet, Take 25 mg by mouth 2 times daily (with meals) , Disp: , Rfl:     calcium acetate (PHOSLO) 667 MG capsule, Take 667 mg by mouth 3 times daily (with meals), Disp: , Rfl:     famotidine (PEPCID) 20 MG tablet, Take 1 tablet by mouth daily (Patient taking differently: Take 20 mg by mouth every morning ), Disp: 60 tablet, Rfl: 3    amLODIPine (NORVASC) 5 MG tablet, Take 1 tablet by mouth daily (Patient taking differently: Take 5 mg by mouth every morning ), Disp: 30 tablet, Rfl: 3    cefdinir (OMNICEF) 300 MG capsule, Take 1 capsule by mouth 2 times daily for 10 days, Disp: 20 capsule, Rfl: 0  Allergies:  Patient has no known allergies.   Social History     Socioeconomic History    Marital status:      Spouse name: Not on file    Number of children: Not on file    Years of education: Not on file    Highest education level: Not on file   Occupational History    Not on file   Social Needs    Financial resource strain: Not on file    Food insecurity:     Worry: Not on file     Inability: Not on file    Transportation needs:     Medical: Not on file     Non-medical: Not on file   Tobacco Use    Smoking status: Current Every Day Smoker     Packs/day: 0.25     Years: 15.00     Pack years: 3.75     Types: Cigarettes    Smokeless tobacco: Never Used    Tobacco comment: 4 cig daily   Substance and Sexual Activity    Alcohol use: No     Alcohol/week: 0.0 oz    Drug use: No    Sexual activity: Not on file   Lifestyle    Physical activity:     Days per week: Not on file     Minutes per session: Not on file    Stress: Not on file   Relationships    Social connections:     Talks on phone: Not on file     Gets together: Not on file     Attends Confucianist service: Not on file     Active member of club or organization: Not on file     Attends meetings of clubs or organizations: Not on file     Relationship status: Not on file    Intimate partner violence:     Fear of current or ex partner: Not on file     Emotionally abused: Not on file     Physically abused: Not on file     Forced sexual activity: Not on file   Other Topics Concern    Not on file   Social History Narrative    ** Merged History Encounter **          Family History   Problem Relation Age of Onset    Stroke Mother     Cancer Father     Diabetes Paternal Aunt        REVIEW OF SYSTEMS:  The chart was reviewed.     PHYSICAL EXAM:    Vitals:    05/31/19 1102   BP: (!) 148/79   Pulse: 100   Resp: 20   Temp: 97.9 °F (36.6 °C)     CONSTITUTIONAL:  awake, alert, cooperative, no apparent distress, and appears stated age  NECK:  supple, symmetrical, trachea midline  LUNGS:  no increased work of breathing,good resp excursion  CARDIOVASCULAR:  regular rate and rhythm   ABDOMEN:  soft, non-distended and non-tender  left upper extremity   Brachial 2+ Radial 1+   + thrill, slightly pulsatile    LABS:    Lab Results   Component Value Date    WBC 7.1 11/28/2018    HGB 9.8 (L) 11/28/2018    HCT 28.5 (L) 11/28/2018     11/28/2018    PROTIME 12.2 11/26/2018    INR 1.1 11/26/2018    APTT 29.8 11/26/2018    K 3.9 11/28/2018    BUN 34 (H) 11/28/2018    CREATININE 9.6 (HH) 11/28/2018       [facility]

## 2019-06-13 NOTE — OP NOTE
Cardiovascular Lab Procedure Report     Kobi Kelly  1985    Date : 6/13/2019  Surgeon: Ameya Cooper M.D. Pre-procedure Diagnosis: ESRD, Poorly functioning fistula  Post-procedure Diagnosis: Same  Procedure:     US guided access of the Left basilic vein   Left upper extremity fistulagram   Plasty of proximal basilic vein with 1W00 Hurley  Anesthesia: Local   Assistants: Cath Lab Staff  Estimated Blood Loss: Minimal  Complications: none  Findings:   Left S/p Intervention   Proximal basilic Vein > 17% stenosis < 30% reisdual stenosis   Distal basilic Vein Patent Patent   Arteriovenous anastamosis Patent Patent   Axillary Vein Patent Patent   Subclavian Vein Patent Patent   Brachiocephalic Vein Patent Patent   Superior Vena Cava Patent Patent     Procedure Details :  Timeout preformed identifying pt and procedure. Left arm prepped and draped in sterile fashion. The Left basilic vein-fistula was identified under US and noted to be patent. It was accessed under US guidance after infiltrating with local. Printer used to print a still image. Micropuncture placed, exchanged out for 4 fr sheath. Fistulogram preformed. There was a high grade stenosis of the proximal basilic vein. A sheath exchange with a 5 fr sheath was done. Angle glide cath and glide wire used to traverse stenosis. The lesion was plastied with 7x40 balloon. There was evidence of some residual stenosis. Completion fistulogram noted much improved filling distally and brisk flow though the stenotic area. Sheath and wire were removed and pressure was held.       Postop Exam  Left arm   - Radial 1+   - Thrill is has significantly improved    Plan  No plans for surgical intervention  Will have pt f/u with me in office    Ameya Cooper

## 2019-07-09 ENCOUNTER — OFFICE VISIT (OUTPATIENT)
Dept: FAMILY MEDICINE CLINIC | Age: 34
End: 2019-07-09
Payer: MEDICARE

## 2019-07-09 VITALS
HEIGHT: 59 IN | SYSTOLIC BLOOD PRESSURE: 124 MMHG | DIASTOLIC BLOOD PRESSURE: 68 MMHG | OXYGEN SATURATION: 98 % | WEIGHT: 140 LBS | HEART RATE: 100 BPM | BODY MASS INDEX: 28.22 KG/M2 | TEMPERATURE: 98.7 F

## 2019-07-09 DIAGNOSIS — E10.22 TYPE 1 DIABETES MELLITUS WITH CHRONIC KIDNEY DISEASE ON CHRONIC DIALYSIS (HCC): ICD-10-CM

## 2019-07-09 DIAGNOSIS — Z99.2 TYPE 1 DIABETES MELLITUS WITH CHRONIC KIDNEY DISEASE ON CHRONIC DIALYSIS (HCC): ICD-10-CM

## 2019-07-09 DIAGNOSIS — H57.11 PAIN IN OR AROUND EYE, RIGHT: Primary | ICD-10-CM

## 2019-07-09 DIAGNOSIS — N18.6 TYPE 1 DIABETES MELLITUS WITH CHRONIC KIDNEY DISEASE ON CHRONIC DIALYSIS (HCC): ICD-10-CM

## 2019-07-09 DIAGNOSIS — R22.0 FACIAL SWELLING: ICD-10-CM

## 2019-07-09 PROCEDURE — 4004F PT TOBACCO SCREEN RCVD TLK: CPT | Performed by: PHYSICIAN ASSISTANT

## 2019-07-09 PROCEDURE — 3046F HEMOGLOBIN A1C LEVEL >9.0%: CPT | Performed by: PHYSICIAN ASSISTANT

## 2019-07-09 PROCEDURE — G8419 CALC BMI OUT NRM PARAM NOF/U: HCPCS | Performed by: PHYSICIAN ASSISTANT

## 2019-07-09 PROCEDURE — G8427 DOCREV CUR MEDS BY ELIG CLIN: HCPCS | Performed by: PHYSICIAN ASSISTANT

## 2019-07-09 PROCEDURE — 2022F DILAT RTA XM EVC RTNOPTHY: CPT | Performed by: PHYSICIAN ASSISTANT

## 2019-07-09 PROCEDURE — 99213 OFFICE O/P EST LOW 20 MIN: CPT | Performed by: PHYSICIAN ASSISTANT

## 2019-07-09 RX ORDER — MEDROXYPROGESTERONE ACETATE 150 MG/ML
INJECTION, SUSPENSION INTRAMUSCULAR
Refills: 3 | COMMUNITY
Start: 2019-06-13 | End: 2019-07-11 | Stop reason: ALTCHOICE

## 2019-07-09 RX ORDER — UBIDECARENONE 75 MG
CAPSULE ORAL
COMMUNITY
End: 2021-01-27

## 2019-07-09 RX ORDER — CLINDAMYCIN HYDROCHLORIDE 300 MG/1
300 CAPSULE ORAL 3 TIMES DAILY
Qty: 30 CAPSULE | Refills: 0 | Status: CANCELLED | OUTPATIENT
Start: 2019-07-09 | End: 2019-07-19

## 2019-07-09 RX ORDER — UBIQUINOL 100 MG
CAPSULE ORAL
Refills: 3 | COMMUNITY
Start: 2019-06-10 | End: 2021-01-27

## 2019-07-09 NOTE — PROGRESS NOTES
incision and drainage (2011); other surgical history (); fracture surgery (); other surgical history (2016); other surgical history (2016); other surgical history (Left, 2017);  section (); pr insert non-tunnel cv cath (N/A, 5/10/2018); pr anastomosis,av,any site (Left, 2018); and pr vitrectomy pars plana remove preretinal membrane (Left, 2018). Social History:  reports that she has been smoking cigarettes. She has a 3.75 pack-year smoking history. She has never used smokeless tobacco. She reports that she does not drink alcohol or use drugs. Family History: family history includes Cancer in her father; Diabetes in her paternal aunt; Stroke in her mother. Allergies: Patient has no known allergies. Physical Exam         VS:  /68   Pulse 100   Temp 98.7 °F (37.1 °C) (Oral)   Ht 4' 11\" (1.499 m)   Wt 140 lb (63.5 kg)   SpO2 98%   BMI 28.28 kg/m²    Oxygen Saturation Interpretation: Normal.    Constitutional:  Alert, development consistent with age. HEENT:  NC/NT. Airway patent. Right facial swelling noted. Eyes:  PERRL, EOMI. Right bottom eyelid edematous and TTP. Mild pain with ROM. Watery discharge present. Left eye without erythema, edema, discharge, or tenderness. Ears:  TMs without perforation, injection, or bulging. External canals clear without exudate. Mouth:  Mucous membranes moist without lesions, tongue and gums normal.  Throat:  Pharynx without injection, exudate, or tonsillar hypertrophy. Airway patient. Neck:  Supple. No lymphadenopathy. Lungs:  Clear to auscultation and breath sounds equal.  Heart:  Regular rate and rhythm. Skin:  Normal turgor and appropriately dry to touch. Neurological:  Orientation age-appropriate unless noted elseware. Motor functions intact. Lab / Imaging Results   (All laboratory and radiology results have been personally reviewed by myself)  Labs:    Imaging:   All Radiology

## 2019-07-10 ENCOUNTER — HOSPITAL ENCOUNTER (EMERGENCY)
Age: 34
Discharge: HOME | End: 2019-07-10
Attending: EMERGENCY MEDICINE
Payer: MEDICARE

## 2019-07-10 VITALS
WEIGHT: 140 LBS | BODY MASS INDEX: 28.22 KG/M2 | HEART RATE: 97 BPM | TEMPERATURE: 98.6 F | DIASTOLIC BLOOD PRESSURE: 64 MMHG | OXYGEN SATURATION: 98 % | SYSTOLIC BLOOD PRESSURE: 138 MMHG | HEIGHT: 59 IN | RESPIRATION RATE: 18 BRPM

## 2019-07-10 DIAGNOSIS — L03.213 PRESEPTAL CELLULITIS OF RIGHT EYE: Primary | ICD-10-CM

## 2019-07-10 PROCEDURE — 6360000002 HC RX W HCPCS: Performed by: EMERGENCY MEDICINE

## 2019-07-10 PROCEDURE — 96365 THER/PROPH/DIAG IV INF INIT: CPT

## 2019-07-10 PROCEDURE — 2580000003 HC RX 258: Performed by: EMERGENCY MEDICINE

## 2019-07-10 PROCEDURE — 96366 THER/PROPH/DIAG IV INF ADDON: CPT

## 2019-07-10 PROCEDURE — 99283 EMERGENCY DEPT VISIT LOW MDM: CPT

## 2019-07-10 RX ORDER — CEFDINIR 300 MG/1
300 CAPSULE ORAL EVERY OTHER DAY
Qty: 5 CAPSULE | Refills: 0 | Status: SHIPPED | OUTPATIENT
Start: 2019-07-10 | End: 2019-07-20

## 2019-07-10 RX ORDER — SODIUM CHLORIDE 0.9 % (FLUSH) 0.9 %
SYRINGE (ML) INJECTION
Status: DISCONTINUED
Start: 2019-07-10 | End: 2019-07-10 | Stop reason: HOSPADM

## 2019-07-10 RX ORDER — AMOXICILLIN AND CLAVULANATE POTASSIUM 500; 125 MG/1; MG/1
1 TABLET, FILM COATED ORAL DAILY
Qty: 10 TABLET | Refills: 0 | Status: SHIPPED | OUTPATIENT
Start: 2019-07-10 | End: 2019-07-20

## 2019-07-10 RX ADMIN — CEFTRIAXONE 1 G: 1 INJECTION, POWDER, FOR SOLUTION INTRAMUSCULAR; INTRAVENOUS at 11:46

## 2019-07-11 ENCOUNTER — OFFICE VISIT (OUTPATIENT)
Dept: FAMILY MEDICINE CLINIC | Age: 34
End: 2019-07-11
Payer: MEDICARE

## 2019-07-11 VITALS
TEMPERATURE: 98.5 F | BODY MASS INDEX: 27.42 KG/M2 | DIASTOLIC BLOOD PRESSURE: 64 MMHG | OXYGEN SATURATION: 96 % | SYSTOLIC BLOOD PRESSURE: 110 MMHG | WEIGHT: 136 LBS | HEART RATE: 112 BPM | HEIGHT: 59 IN

## 2019-07-11 DIAGNOSIS — N18.6 ESRD (END STAGE RENAL DISEASE) ON DIALYSIS (HCC): Chronic | ICD-10-CM

## 2019-07-11 DIAGNOSIS — E55.9 VITAMIN D DEFICIENCY: ICD-10-CM

## 2019-07-11 DIAGNOSIS — E10.65 UNCONTROLLED TYPE 1 DIABETES MELLITUS WITH HYPERGLYCEMIA (HCC): Chronic | ICD-10-CM

## 2019-07-11 DIAGNOSIS — Z99.2 ESRD (END STAGE RENAL DISEASE) ON DIALYSIS (HCC): Chronic | ICD-10-CM

## 2019-07-11 DIAGNOSIS — I82.721 CHRONIC DEEP VEIN THROMBOSIS (DVT) OF OTHER VEIN OF RIGHT UPPER EXTREMITY (HCC): ICD-10-CM

## 2019-07-11 DIAGNOSIS — I10 HTN (HYPERTENSION), BENIGN: Primary | Chronic | ICD-10-CM

## 2019-07-11 PROCEDURE — 99214 OFFICE O/P EST MOD 30 MIN: CPT | Performed by: NURSE PRACTITIONER

## 2019-07-11 RX ORDER — BLOOD-GLUCOSE METER
KIT MISCELLANEOUS
Refills: 2 | COMMUNITY
Start: 2019-07-07 | End: 2021-01-27

## 2019-07-11 RX ORDER — CARVEDILOL 25 MG/1
50 TABLET ORAL EVERY MORNING
Refills: 5 | COMMUNITY
Start: 2019-07-07

## 2019-07-11 ASSESSMENT — ENCOUNTER SYMPTOMS
COLOR CHANGE: 0
NAUSEA: 0
VOMITING: 0
TROUBLE SWALLOWING: 0
COUGH: 0
EYE PAIN: 1
CHEST TIGHTNESS: 0
SINUS PAIN: 0
SHORTNESS OF BREATH: 0
CONSTIPATION: 0
DIARRHEA: 0
WHEEZING: 0

## 2019-07-11 ASSESSMENT — PATIENT HEALTH QUESTIONNAIRE - PHQ9
2. FEELING DOWN, DEPRESSED OR HOPELESS: 0
SUM OF ALL RESPONSES TO PHQ9 QUESTIONS 1 & 2: 0
SUM OF ALL RESPONSES TO PHQ QUESTIONS 1-9: 0
DEPRESSION UNABLE TO ASSESS: FUNCTIONAL CAPACITY MOTIVATION LIMITS ACCURACY
1. LITTLE INTEREST OR PLEASURE IN DOING THINGS: 0
SUM OF ALL RESPONSES TO PHQ QUESTIONS 1-9: 0

## 2019-07-11 NOTE — PROGRESS NOTES
MD at [facility] OR      Family History   Problem Relation Age of Onset    Stroke Mother     Cancer Father     Diabetes Paternal Aunt      Social History     Socioeconomic History    Marital status:      Spouse name: Not on file    Number of children: Not on file    Years of education: Not on file    Highest education level: Not on file   Occupational History    Not on file   Social Needs    Financial resource strain: Not on file    Food insecurity:     Worry: Not on file     Inability: Not on file    Transportation needs:     Medical: Not on file     Non-medical: Not on file   Tobacco Use    Smoking status: Current Every Day Smoker     Packs/day: 0.25     Years: 15.00     Pack years: 3.75     Types: Cigarettes    Smokeless tobacco: Never Used    Tobacco comment: 4 cig daily   Substance and Sexual Activity    Alcohol use: No     Alcohol/week: 0.0 oz    Drug use: No    Sexual activity: Not on file   Lifestyle    Physical activity:     Days per week: Not on file     Minutes per session: Not on file    Stress: Not on file   Relationships    Social connections:     Talks on phone: Not on file     Gets together: Not on file     Attends Sikhism service: Not on file     Active member of club or organization: Not on file     Attends meetings of clubs or organizations: Not on file     Relationship status: Not on file    Intimate partner violence:     Fear of current or ex partner: Not on file     Emotionally abused: Not on file     Physically abused: Not on file     Forced sexual activity: Not on file   Other Topics Concern    Not on file   Social History Narrative    ** Merged History Encounter **           No Known Allergies     Prior to Visit Medications    Medication Sig Taking?  Authorizing Provider   carvedilol (COREG) 25 MG tablet TK 1 T PO BID Yes Historical Provider, MD   FREESTYLE LITE strip TEST QID Yes Historical Provider, MD   cefdinir (OMNICEF) 300 MG capsule Take 1 capsule by mouth disturbance. Respiratory: Negative for cough (Chronic cough), chest tightness, shortness of breath and wheezing. Cardiovascular: Negative for chest pain, palpitations and leg swelling. Gastrointestinal: Negative for constipation, diarrhea, nausea and vomiting. Endocrine: Negative for cold intolerance, heat intolerance, polydipsia, polyphagia and polyuria. Genitourinary: Negative for difficulty urinating (Just urinates in the AM). Musculoskeletal: Negative for arthralgias and myalgias. Skin: Negative for color change, rash and wound. Neurological: Negative for dizziness, light-headedness and headaches. Hematological: Negative for adenopathy. Does not bruise/bleed easily. Psychiatric/Behavioral: Negative for agitation, decreased concentration, sleep disturbance and suicidal ideas. The patient is not nervous/anxious. BP Readings from Last 1 Encounters:   07/11/19 110/64    Recheck if >140/90  Hemoglobin A1C (%)   Date Value   09/29/2018 8.1 (H)     No results found for: LABMICR    Have you had your Pneumonia Vaccine yes    Have you had a Colorectal Screening  N/A    Have you had a Screening Mammogram  yes    Have you seen any other physician or provider since your last visit yes - dialysis    Have you had any other diagnostic tests since your last visit? yes - dialysis    Physical Exam:    Vitals:    07/11/19 1111   BP: 110/64   Pulse: 112   Temp: 98.5 °F (36.9 °C)   TempSrc: Oral   SpO2: 96%   Weight: 136 lb (61.7 kg)   Height: 4' 11\" (1.499 m)     Physical Exam   Constitutional: She is oriented to person, place, and time. She appears well-developed and well-nourished. HENT:   Head: Normocephalic and atraumatic. Eyes: Pupils are equal, round, and reactive to light. EOM are normal. Right eye exhibits no discharge. Left eye exhibits no discharge. No scleral icterus. Orbital edema on right eyelid, no erythema, discharge or visual changes noted. Neck: Normal range of motion.  Neck in about 2 months (around 9/11/2019). , or sooner if necessary.

## 2019-09-16 ENCOUNTER — HOSPITAL ENCOUNTER (EMERGENCY)
Age: 34
Discharge: HOME | End: 2019-09-16
Attending: EMERGENCY MEDICINE
Payer: MEDICARE

## 2019-09-16 VITALS
BODY MASS INDEX: 27.21 KG/M2 | HEART RATE: 101 BPM | RESPIRATION RATE: 16 BRPM | TEMPERATURE: 98.8 F | SYSTOLIC BLOOD PRESSURE: 144 MMHG | DIASTOLIC BLOOD PRESSURE: 76 MMHG | OXYGEN SATURATION: 97 % | WEIGHT: 135 LBS | HEIGHT: 59 IN

## 2019-09-16 DIAGNOSIS — L03.211 FACIAL CELLULITIS: Primary | ICD-10-CM

## 2019-09-16 DIAGNOSIS — D63.8 ANEMIA OF CHRONIC DISEASE: ICD-10-CM

## 2019-09-16 DIAGNOSIS — Z99.2 ESRD ON DIALYSIS (HCC): ICD-10-CM

## 2019-09-16 DIAGNOSIS — N18.6 ESRD ON DIALYSIS (HCC): ICD-10-CM

## 2019-09-16 LAB
ANION GAP SERPL CALCULATED.3IONS-SCNC: 14 MMOL/L (ref 7–16)
BASOPHILS ABSOLUTE: 0.01 E9/L (ref 0–0.2)
BASOPHILS RELATIVE PERCENT: 0.1 % (ref 0–2)
BUN BLDV-MCNC: 26 MG/DL (ref 6–20)
CALCIUM SERPL-MCNC: 8.7 MG/DL (ref 8.6–10.2)
CHLORIDE BLD-SCNC: 92 MMOL/L (ref 98–107)
CO2: 33 MMOL/L (ref 22–29)
CREAT SERPL-MCNC: 10.7 MG/DL (ref 0.5–1)
EOSINOPHILS ABSOLUTE: 0.14 E9/L (ref 0.05–0.5)
EOSINOPHILS RELATIVE PERCENT: 1.4 % (ref 0–6)
GFR AFRICAN AMERICAN: 5
GFR NON-AFRICAN AMERICAN: 5 ML/MIN/1.73
GLUCOSE BLD-MCNC: 119 MG/DL (ref 74–99)
HCT VFR BLD CALC: 28 % (ref 34–48)
HEMOGLOBIN: 9.6 G/DL (ref 11.5–15.5)
IMMATURE GRANULOCYTES #: 0.04 E9/L
IMMATURE GRANULOCYTES %: 0.4 % (ref 0–5)
LACTIC ACID: 1.1 MMOL/L (ref 0.5–2.2)
LYMPHOCYTES ABSOLUTE: 2.12 E9/L (ref 1.5–4)
LYMPHOCYTES RELATIVE PERCENT: 20.5 % (ref 20–42)
MCH RBC QN AUTO: 34 PG (ref 26–35)
MCHC RBC AUTO-ENTMCNC: 34.3 % (ref 32–34.5)
MCV RBC AUTO: 99.3 FL (ref 80–99.9)
MONOCYTES ABSOLUTE: 0.42 E9/L (ref 0.1–0.95)
MONOCYTES RELATIVE PERCENT: 4.1 % (ref 2–12)
NEUTROPHILS ABSOLUTE: 7.62 E9/L (ref 1.8–7.3)
NEUTROPHILS RELATIVE PERCENT: 73.5 % (ref 43–80)
PDW BLD-RTO: 13.5 FL (ref 11.5–15)
PLATELET # BLD: 218 E9/L (ref 130–450)
PMV BLD AUTO: 10.6 FL (ref 7–12)
POTASSIUM SERPL-SCNC: 4.1 MMOL/L (ref 3.5–5)
RBC # BLD: 2.82 E12/L (ref 3.5–5.5)
SODIUM BLD-SCNC: 139 MMOL/L (ref 132–146)
WBC # BLD: 10.4 E9/L (ref 4.5–11.5)

## 2019-09-16 PROCEDURE — 6360000002 HC RX W HCPCS: Performed by: STUDENT IN AN ORGANIZED HEALTH CARE EDUCATION/TRAINING PROGRAM

## 2019-09-16 PROCEDURE — 99283 EMERGENCY DEPT VISIT LOW MDM: CPT

## 2019-09-16 PROCEDURE — 96374 THER/PROPH/DIAG INJ IV PUSH: CPT

## 2019-09-16 PROCEDURE — 80048 BASIC METABOLIC PNL TOTAL CA: CPT

## 2019-09-16 PROCEDURE — 83605 ASSAY OF LACTIC ACID: CPT

## 2019-09-16 PROCEDURE — 87040 BLOOD CULTURE FOR BACTERIA: CPT

## 2019-09-16 PROCEDURE — 6370000000 HC RX 637 (ALT 250 FOR IP): Performed by: STUDENT IN AN ORGANIZED HEALTH CARE EDUCATION/TRAINING PROGRAM

## 2019-09-16 PROCEDURE — 2580000003 HC RX 258: Performed by: STUDENT IN AN ORGANIZED HEALTH CARE EDUCATION/TRAINING PROGRAM

## 2019-09-16 PROCEDURE — 85025 COMPLETE CBC W/AUTO DIFF WBC: CPT

## 2019-09-16 RX ORDER — 0.9 % SODIUM CHLORIDE 0.9 %
1000 INTRAVENOUS SOLUTION INTRAVENOUS ONCE
Status: COMPLETED | OUTPATIENT
Start: 2019-09-16 | End: 2019-09-16

## 2019-09-16 RX ORDER — ACETAMINOPHEN 500 MG
1000 TABLET ORAL ONCE
Status: COMPLETED | OUTPATIENT
Start: 2019-09-16 | End: 2019-09-16

## 2019-09-16 RX ORDER — DOXYCYCLINE HYCLATE 100 MG
100 TABLET ORAL 2 TIMES DAILY
Qty: 20 TABLET | Refills: 0 | Status: SHIPPED | OUTPATIENT
Start: 2019-09-16 | End: 2019-09-26

## 2019-09-16 RX ORDER — DEXAMETHASONE SODIUM PHOSPHATE 10 MG/ML
10 INJECTION INTRAMUSCULAR; INTRAVENOUS ONCE
Status: COMPLETED | OUTPATIENT
Start: 2019-09-16 | End: 2019-09-16

## 2019-09-16 RX ORDER — DOXYCYCLINE HYCLATE 100 MG/1
100 CAPSULE ORAL ONCE
Status: COMPLETED | OUTPATIENT
Start: 2019-09-16 | End: 2019-09-16

## 2019-09-16 RX ADMIN — DOXYCYCLINE HYCLATE 100 MG: 100 CAPSULE ORAL at 11:40

## 2019-09-16 RX ADMIN — SODIUM CHLORIDE 1000 ML: 9 INJECTION, SOLUTION INTRAVENOUS at 09:45

## 2019-09-16 RX ADMIN — DEXAMETHASONE SODIUM PHOSPHATE 10 MG: 10 INJECTION INTRAMUSCULAR; INTRAVENOUS at 09:46

## 2019-09-16 RX ADMIN — ACETAMINOPHEN 1000 MG: 500 TABLET ORAL at 09:45

## 2019-09-16 ASSESSMENT — ENCOUNTER SYMPTOMS
VOMITING: 0
ABDOMINAL PAIN: 0
SINUS PAIN: 0
TROUBLE SWALLOWING: 0
VOICE CHANGE: 0
SHORTNESS OF BREATH: 0
COLOR CHANGE: 0
SINUS PRESSURE: 0
SORE THROAT: 0
BACK PAIN: 0
RHINORRHEA: 1
DIARRHEA: 0
COUGH: 0
FACIAL SWELLING: 1
NAUSEA: 0

## 2019-09-16 ASSESSMENT — PAIN SCALES - GENERAL
PAINLEVEL_OUTOF10: 5
PAINLEVEL_OUTOF10: 6

## 2019-09-16 NOTE — ED PROVIDER NOTES
7.30 E9/L    Immature Granulocytes # 0.04 E9/L    Lymphocytes Absolute 2.12 1.50 - 4.00 E9/L    Monocytes Absolute 0.42 0.10 - 0.95 E9/L    Eosinophils Absolute 0.14 0.05 - 0.50 E9/L    Basophils Absolute 0.01 0.00 - 0.20 E9/L   Lactic Acid, Plasma   Result Value Ref Range    Lactic Acid 1.1 0.5 - 2.2 mmol/L   Basic Metabolic Panel   Result Value Ref Range    Sodium 139 132 - 146 mmol/L    Potassium 4.1 3.5 - 5.0 mmol/L    Chloride 92 (L) 98 - 107 mmol/L    CO2 33 (H) 22 - 29 mmol/L    Anion Gap 14 7 - 16 mmol/L    Glucose 119 (H) 74 - 99 mg/dL    BUN 26 (H) 6 - 20 mg/dL    CREATININE 10.7 (HH) 0.5 - 1.0 mg/dL    GFR Non-African American 5 >=60 mL/min/1.73    GFR African American 5     Calcium 8.7 8.6 - 10.2 mg/dL       Radiology:  No orders to display       ------------------------- NURSING NOTES AND VITALS REVIEWED ---------------------------  Date / Time Roomed:  9/16/2019  8:40 AM  ED Bed Assignment:  20/20    The nursing notes within the ED encounter and vital signs as below have been reviewed. BP (!) 144/76   Pulse 101   Temp 98.8 °F (37.1 °C) (Oral)   Resp 16   Ht 4' 11\" (1.499 m)   Wt 135 lb (61.2 kg)   SpO2 97%   BMI 27.27 kg/m²   Oxygen Saturation Interpretation: Normal      ------------------------------------------ PROGRESS NOTES ------------------------------------------  11:28 AM  I have spoken with the patient and discussed todays results, in addition to providing specific details for the plan of care and counseling regarding the diagnosis and prognosis. Their questions are answered at this time and they are agreeable with the plan. I discussed at length with them reasons for immediate return here for re evaluation. They will followup with their Dentist and primary care physician by calling their office tomorrow.       --------------------------------- ADDITIONAL PROVIDER NOTES ---------------------------------  At this time the patient is without objective evidence of an acute process requiring hospitalization or inpatient management. They have remained hemodynamically stable throughout their entire ED visit and are stable for discharge with outpatient follow-up. The plan has been discussed in detail and they are aware of the specific conditions for emergent return, as well as the importance of follow-up. New Prescriptions    DOXYCYCLINE HYCLATE (VIBRA-TABS) 100 MG TABLET    Take 1 tablet by mouth 2 times daily for 10 days       Diagnosis:  1. Facial cellulitis    2. ESRD on dialysis ([facility] [address]    3. Anemia of chronic disease        Disposition:  Patient's disposition: Discharge to home  Patient's condition is stable.           Sukhdev Tillman DO  Resident  09/16/19 [id number]

## 2019-09-19 ENCOUNTER — OFFICE VISIT (OUTPATIENT)
Dept: FAMILY MEDICINE CLINIC | Age: 34
End: 2019-09-19
Payer: MEDICARE

## 2019-09-19 VITALS
HEIGHT: 59 IN | OXYGEN SATURATION: 99 % | DIASTOLIC BLOOD PRESSURE: 64 MMHG | BODY MASS INDEX: 28.17 KG/M2 | WEIGHT: 139.75 LBS | HEART RATE: 101 BPM | SYSTOLIC BLOOD PRESSURE: 120 MMHG | TEMPERATURE: 98.7 F

## 2019-09-19 DIAGNOSIS — L03.211 FACIAL CELLULITIS: Primary | ICD-10-CM

## 2019-09-19 DIAGNOSIS — J33.9 NASAL POLYP: ICD-10-CM

## 2019-09-19 PROBLEM — A41.9 SEPSIS (HCC): Status: RESOLVED | Noted: 2018-05-07 | Resolved: 2019-09-19

## 2019-09-19 PROBLEM — N18.6 ENCOUNTER REGARDING VASCULAR ACCESS FOR DIALYSIS FOR ESRD (HCC): Chronic | Status: RESOLVED | Noted: 2018-07-12 | Resolved: 2019-09-19

## 2019-09-19 PROBLEM — J18.9 PNEUMONIA: Status: RESOLVED | Noted: 2018-11-26 | Resolved: 2019-09-19

## 2019-09-19 PROBLEM — Z99.2 ENCOUNTER REGARDING VASCULAR ACCESS FOR DIALYSIS FOR ESRD (HCC): Chronic | Status: RESOLVED | Noted: 2018-07-12 | Resolved: 2019-09-19

## 2019-09-19 PROCEDURE — G8419 CALC BMI OUT NRM PARAM NOF/U: HCPCS | Performed by: NURSE PRACTITIONER

## 2019-09-19 PROCEDURE — 4004F PT TOBACCO SCREEN RCVD TLK: CPT | Performed by: NURSE PRACTITIONER

## 2019-09-19 PROCEDURE — 99213 OFFICE O/P EST LOW 20 MIN: CPT | Performed by: NURSE PRACTITIONER

## 2019-09-19 PROCEDURE — G8427 DOCREV CUR MEDS BY ELIG CLIN: HCPCS | Performed by: NURSE PRACTITIONER

## 2019-09-19 ASSESSMENT — ENCOUNTER SYMPTOMS
DIARRHEA: 0
COUGH: 0
EYE PAIN: 0
NAUSEA: 0
SHORTNESS OF BREATH: 0
COLOR CHANGE: 0
WHEEZING: 0
CHEST TIGHTNESS: 0
VOMITING: 0
SINUS PAIN: 0
CONSTIPATION: 0

## 2019-09-19 NOTE — PROGRESS NOTES
into the skin nightly  Yes Historical Provider, MD   calcium acetate (PHOSLO) 667 MG capsule Take 667 mg by mouth 3 times daily (with meals) Yes Historical Provider, MD   famotidine (PEPCID) 20 MG tablet Take 1 tablet by mouth daily  Patient taking differently: Take 20 mg by mouth every morning  Yes Savanah Tiwari MD   amLODIPine (NORVASC) 5 MG tablet Take 1 tablet by mouth daily  Patient taking differently: Take 5 mg by mouth every morning  Yes Thor Flake, DO     No Known Allergies      Review of Systems    Review of Systems   Constitutional: Negative for activity change, appetite change, chills and fever. HENT: Positive for nosebleeds and postnasal drip. Negative for congestion, ear pain, sinus pain and sneezing. Nose hurts & swollen face under eyes   Eyes: Negative for pain and visual disturbance. Respiratory: Negative for cough, chest tightness, shortness of breath and wheezing. Cardiovascular: Negative for chest pain, palpitations and leg swelling. Gastrointestinal: Negative for constipation, diarrhea, nausea and vomiting. Endocrine: Negative for cold intolerance, heat intolerance and polyuria. Genitourinary: Positive for difficulty urinating (CKD). Musculoskeletal: Negative for arthralgias and myalgias. Skin: Negative for color change and rash. Neurological: Negative for dizziness, light-headedness and headaches. Psychiatric/Behavioral: Negative for agitation, decreased concentration, sleep disturbance and suicidal ideas. The patient is not nervous/anxious. VS:  /64   Pulse 101   Temp 98.7 °F (37.1 °C)   Ht 4' 11\" (1.499 m)   Wt 139 lb 12 oz (63.4 kg)   SpO2 99%   BMI 28.23 kg/m²     Physical Exam    Physical Exam   Constitutional: She is oriented to person, place, and time. She appears well-developed and well-nourished. No distress. HENT:   Head: Normocephalic and atraumatic. Nose: Mucosal edema, rhinorrhea and sinus tenderness present.  Right sinus exhibits maxillary sinus tenderness. Left sinus exhibits maxillary sinus tenderness. Mouth/Throat: Oropharynx is clear and moist and mucous membranes are normal. No uvula swelling. No oropharyngeal exudate. No tonsillar exudate. Eyes: Pupils are equal, round, and reactive to light. Conjunctivae and EOM are normal.   Neck: Normal range of motion. Neck supple. No JVD present. No thyromegaly present. Cardiovascular: Normal rate, regular rhythm, normal heart sounds and intact distal pulses. No murmur heard. Pulmonary/Chest: Effort normal and breath sounds normal. No respiratory distress. She has no wheezes. Abdominal: Soft. Bowel sounds are normal. She exhibits no distension. There is no tenderness. Genitourinary:   Genitourinary Comments: Deferred. Musculoskeletal: Normal range of motion. Lymphadenopathy:     She has no cervical adenopathy. Neurological: She is alert and oriented to person, place, and time. No cranial nerve deficit or sensory deficit. She exhibits normal muscle tone. Coordination normal.   Skin: Skin is warm and dry. Capillary refill takes less than 2 seconds. No rash noted. She is not diaphoretic. No erythema. Psychiatric: She has a normal mood and affect. Her behavior is normal. Judgment and thought content normal.   Vitals reviewed. Health Maintenance    BP Readings from Last 1 Encounters:   09/19/19 120/64    Recheck if >140/90  Hemoglobin A1C (%)   Date Value   09/29/2018 8.1 (H)     Plan:    Mikie Pepe was seen today for follow-up from hospital, facial swelling and sore.     Diagnoses and all orders for this visit:    Facial cellulitis/ Nasal polyp  - Mikie Pepe is advised to continue to take her antibiotic as prescribed & notify us if symptoms do not improve  - She is strongly encouraged to stop placing q-tip, medications or anything into her nostril to \"scratch\" as do not want to obtain secondary infection or injury    - No lab work obtained today, dialysis records

## 2019-09-21 LAB
BLOOD CULTURE, ROUTINE: NORMAL
CULTURE, BLOOD 2: NORMAL

## 2019-10-25 ENCOUNTER — OFFICE VISIT (OUTPATIENT)
Dept: FAMILY MEDICINE CLINIC | Age: 34
End: 2019-10-25
Payer: MEDICARE

## 2019-10-25 VITALS
BODY MASS INDEX: 29.23 KG/M2 | SYSTOLIC BLOOD PRESSURE: 128 MMHG | DIASTOLIC BLOOD PRESSURE: 60 MMHG | HEIGHT: 59 IN | OXYGEN SATURATION: 96 % | HEART RATE: 96 BPM | TEMPERATURE: 98.1 F | WEIGHT: 145 LBS

## 2019-10-25 DIAGNOSIS — L02.31 ABSCESS OF RIGHT BUTTOCK: Primary | ICD-10-CM

## 2019-10-25 PROCEDURE — 99214 OFFICE O/P EST MOD 30 MIN: CPT | Performed by: PHYSICIAN ASSISTANT

## 2019-10-25 PROCEDURE — G8419 CALC BMI OUT NRM PARAM NOF/U: HCPCS | Performed by: PHYSICIAN ASSISTANT

## 2019-10-25 PROCEDURE — G8484 FLU IMMUNIZE NO ADMIN: HCPCS | Performed by: PHYSICIAN ASSISTANT

## 2019-10-25 PROCEDURE — G8427 DOCREV CUR MEDS BY ELIG CLIN: HCPCS | Performed by: PHYSICIAN ASSISTANT

## 2019-10-25 PROCEDURE — 10060 I&D ABSCESS SIMPLE/SINGLE: CPT | Performed by: PHYSICIAN ASSISTANT

## 2019-10-25 PROCEDURE — 4004F PT TOBACCO SCREEN RCVD TLK: CPT | Performed by: PHYSICIAN ASSISTANT

## 2019-10-25 RX ORDER — CLINDAMYCIN PALMITATE HYDROCHLORIDE 75 MG/5ML
300 SOLUTION ORAL 3 TIMES DAILY
Qty: 600 ML | Refills: 0 | Status: ON HOLD | OUTPATIENT
Start: 2019-10-25 | End: 2019-11-04 | Stop reason: ALTCHOICE

## 2019-10-28 ENCOUNTER — OFFICE VISIT (OUTPATIENT)
Dept: FAMILY MEDICINE CLINIC | Age: 34
End: 2019-10-28
Payer: MEDICARE

## 2019-10-28 VITALS
HEIGHT: 59 IN | DIASTOLIC BLOOD PRESSURE: 68 MMHG | HEART RATE: 95 BPM | OXYGEN SATURATION: 97 % | WEIGHT: 146.75 LBS | BODY MASS INDEX: 29.58 KG/M2 | SYSTOLIC BLOOD PRESSURE: 114 MMHG | TEMPERATURE: 98.5 F | RESPIRATION RATE: 16 BRPM

## 2019-10-28 DIAGNOSIS — L02.31 ABSCESS OF LEFT BUTTOCK: Primary | ICD-10-CM

## 2019-10-28 PROCEDURE — G8419 CALC BMI OUT NRM PARAM NOF/U: HCPCS | Performed by: NURSE PRACTITIONER

## 2019-10-28 PROCEDURE — 4004F PT TOBACCO SCREEN RCVD TLK: CPT | Performed by: NURSE PRACTITIONER

## 2019-10-28 PROCEDURE — 99213 OFFICE O/P EST LOW 20 MIN: CPT | Performed by: NURSE PRACTITIONER

## 2019-10-28 PROCEDURE — G8484 FLU IMMUNIZE NO ADMIN: HCPCS | Performed by: NURSE PRACTITIONER

## 2019-10-28 PROCEDURE — G8427 DOCREV CUR MEDS BY ELIG CLIN: HCPCS | Performed by: NURSE PRACTITIONER

## 2019-11-04 ENCOUNTER — HOSPITAL ENCOUNTER (INPATIENT)
Age: 34
LOS: 4 days | Discharge: HOME | DRG: 579 | End: 2019-11-08
Attending: EMERGENCY MEDICINE | Admitting: INTERNAL MEDICINE
Payer: MEDICARE

## 2019-11-04 ENCOUNTER — APPOINTMENT (OUTPATIENT)
Dept: CT IMAGING | Age: 34
DRG: 579 | End: 2019-11-04
Payer: MEDICARE

## 2019-11-04 DIAGNOSIS — Z99.2 DIALYSIS PATIENT (HCC): ICD-10-CM

## 2019-11-04 DIAGNOSIS — R73.9 HYPERGLYCEMIA: ICD-10-CM

## 2019-11-04 DIAGNOSIS — L03.213 PERIORBITAL CELLULITIS OF RIGHT EYE: Primary | ICD-10-CM

## 2019-11-04 PROBLEM — L23.3 ALLERGIC CONTACT DERMATITIS DUE TO DRUGS IN CONTACT WITH SKIN: Status: ACTIVE | Noted: 2019-11-04

## 2019-11-04 LAB
ALBUMIN SERPL-MCNC: 4.3 G/DL (ref 3.5–5.2)
ALP BLD-CCNC: 83 U/L (ref 35–104)
ALT SERPL-CCNC: 9 U/L (ref 0–32)
ANION GAP SERPL CALCULATED.3IONS-SCNC: 16 MMOL/L (ref 7–16)
AST SERPL-CCNC: 17 U/L (ref 0–31)
BASOPHILS ABSOLUTE: 0.01 E9/L (ref 0–0.2)
BASOPHILS RELATIVE PERCENT: 0.2 % (ref 0–2)
BETA-HYDROXYBUTYRATE: 0.12 MMOL/L (ref 0.02–0.27)
BETA-HYDROXYBUTYRATE: 0.28 MMOL/L (ref 0.02–0.27)
BILIRUB SERPL-MCNC: 0.4 MG/DL (ref 0–1.2)
BUN BLDV-MCNC: 33 MG/DL (ref 6–20)
CALCIUM SERPL-MCNC: 9 MG/DL (ref 8.6–10.2)
CHLORIDE BLD-SCNC: 88 MMOL/L (ref 98–107)
CHP ED QC CHECK: YES
CO2: 29 MMOL/L (ref 22–29)
CREAT SERPL-MCNC: 11.9 MG/DL (ref 0.5–1)
EOSINOPHILS ABSOLUTE: 0.16 E9/L (ref 0.05–0.5)
EOSINOPHILS RELATIVE PERCENT: 2.8 % (ref 0–6)
GFR AFRICAN AMERICAN: 4
GFR NON-AFRICAN AMERICAN: 4 ML/MIN/1.73
GLUCOSE BLD-MCNC: 525 MG/DL (ref 74–99)
GLUCOSE BLD-MCNC: NORMAL MG/DL
HCT VFR BLD CALC: 30.4 % (ref 34–48)
HEMOGLOBIN: 10.4 G/DL (ref 11.5–15.5)
IMMATURE GRANULOCYTES #: 0.01 E9/L
IMMATURE GRANULOCYTES %: 0.2 % (ref 0–5)
LACTIC ACID: 1.7 MMOL/L (ref 0.5–2.2)
LYMPHOCYTES ABSOLUTE: 1.87 E9/L (ref 1.5–4)
LYMPHOCYTES RELATIVE PERCENT: 33 % (ref 20–42)
MCH RBC QN AUTO: 32.9 PG (ref 26–35)
MCHC RBC AUTO-ENTMCNC: 34.2 % (ref 32–34.5)
MCV RBC AUTO: 96.2 FL (ref 80–99.9)
METER GLUCOSE: 108 MG/DL (ref 74–99)
METER GLUCOSE: 255 MG/DL (ref 74–99)
METER GLUCOSE: <40 MG/DL (ref 74–99)
METER GLUCOSE: >500 MG/DL (ref 74–99)
MONOCYTES ABSOLUTE: 0.42 E9/L (ref 0.1–0.95)
MONOCYTES RELATIVE PERCENT: 7.4 % (ref 2–12)
NEUTROPHILS ABSOLUTE: 3.19 E9/L (ref 1.8–7.3)
NEUTROPHILS RELATIVE PERCENT: 56.4 % (ref 43–80)
PDW BLD-RTO: 14.5 FL (ref 11.5–15)
PH VENOUS: 7.33 (ref 7.35–7.45)
PLATELET # BLD: 198 E9/L (ref 130–450)
PMV BLD AUTO: 10 FL (ref 7–12)
POTASSIUM SERPL-SCNC: 4.8 MMOL/L (ref 3.5–5)
RBC # BLD: 3.16 E12/L (ref 3.5–5.5)
SODIUM BLD-SCNC: 133 MMOL/L (ref 132–146)
TOTAL PROTEIN: 7.7 G/DL (ref 6.4–8.3)
WBC # BLD: 5.7 E9/L (ref 4.5–11.5)

## 2019-11-04 PROCEDURE — 99222 1ST HOSP IP/OBS MODERATE 55: CPT | Performed by: INTERNAL MEDICINE

## 2019-11-04 PROCEDURE — 83605 ASSAY OF LACTIC ACID: CPT

## 2019-11-04 PROCEDURE — 96365 THER/PROPH/DIAG IV INF INIT: CPT

## 2019-11-04 PROCEDURE — 82800 BLOOD PH: CPT

## 2019-11-04 PROCEDURE — 82010 KETONE BODYS QUAN: CPT

## 2019-11-04 PROCEDURE — 82962 GLUCOSE BLOOD TEST: CPT

## 2019-11-04 PROCEDURE — 96375 TX/PRO/DX INJ NEW DRUG ADDON: CPT

## 2019-11-04 PROCEDURE — 2060000000 HC ICU INTERMEDIATE R&B

## 2019-11-04 PROCEDURE — 36415 COLL VENOUS BLD VENIPUNCTURE: CPT

## 2019-11-04 PROCEDURE — 87040 BLOOD CULTURE FOR BACTERIA: CPT

## 2019-11-04 PROCEDURE — 96372 THER/PROPH/DIAG INJ SC/IM: CPT

## 2019-11-04 PROCEDURE — 2580000003 HC RX 258: Performed by: INTERNAL MEDICINE

## 2019-11-04 PROCEDURE — 6370000000 HC RX 637 (ALT 250 FOR IP): Performed by: STUDENT IN AN ORGANIZED HEALTH CARE EDUCATION/TRAINING PROGRAM

## 2019-11-04 PROCEDURE — 2580000003 HC RX 258: Performed by: STUDENT IN AN ORGANIZED HEALTH CARE EDUCATION/TRAINING PROGRAM

## 2019-11-04 PROCEDURE — 99284 EMERGENCY DEPT VISIT MOD MDM: CPT

## 2019-11-04 PROCEDURE — 6360000002 HC RX W HCPCS: Performed by: STUDENT IN AN ORGANIZED HEALTH CARE EDUCATION/TRAINING PROGRAM

## 2019-11-04 PROCEDURE — 85025 COMPLETE CBC W/AUTO DIFF WBC: CPT

## 2019-11-04 PROCEDURE — 6370000000 HC RX 637 (ALT 250 FOR IP): Performed by: INTERNAL MEDICINE

## 2019-11-04 PROCEDURE — 80053 COMPREHEN METABOLIC PANEL: CPT

## 2019-11-04 PROCEDURE — 70486 CT MAXILLOFACIAL W/O DYE: CPT

## 2019-11-04 RX ORDER — INSULIN GLARGINE 100 [IU]/ML
16 INJECTION, SOLUTION SUBCUTANEOUS NIGHTLY
Status: DISCONTINUED | OUTPATIENT
Start: 2019-11-04 | End: 2019-11-05

## 2019-11-04 RX ORDER — DEXTROSE MONOHYDRATE 50 MG/ML
100 INJECTION, SOLUTION INTRAVENOUS PRN
Status: DISCONTINUED | OUTPATIENT
Start: 2019-11-04 | End: 2019-11-04

## 2019-11-04 RX ORDER — SULFAMETHOXAZOLE AND TRIMETHOPRIM 800; 160 MG/1; MG/1
1 TABLET ORAL ONCE
Status: DISCONTINUED | OUTPATIENT
Start: 2019-11-04 | End: 2019-11-04

## 2019-11-04 RX ORDER — DEXTROSE MONOHYDRATE 25 G/50ML
12.5 INJECTION, SOLUTION INTRAVENOUS PRN
Status: DISCONTINUED | OUTPATIENT
Start: 2019-11-04 | End: 2019-11-08 | Stop reason: HOSPADM

## 2019-11-04 RX ORDER — NICOTINE POLACRILEX 4 MG
15 LOZENGE BUCCAL PRN
Status: DISCONTINUED | OUTPATIENT
Start: 2019-11-04 | End: 2019-11-08 | Stop reason: HOSPADM

## 2019-11-04 RX ORDER — DEXTROSE MONOHYDRATE 100 MG/ML
INJECTION, SOLUTION INTRAVENOUS CONTINUOUS
Status: DISCONTINUED | OUTPATIENT
Start: 2019-11-04 | End: 2019-11-06

## 2019-11-04 RX ORDER — AMLODIPINE BESYLATE 5 MG/1
5 TABLET ORAL EVERY MORNING
Status: DISCONTINUED | OUTPATIENT
Start: 2019-11-05 | End: 2019-11-06

## 2019-11-04 RX ORDER — AMOXICILLIN AND CLAVULANATE POTASSIUM 875; 125 MG/1; MG/1
1 TABLET, FILM COATED ORAL ONCE
Status: DISCONTINUED | OUTPATIENT
Start: 2019-11-04 | End: 2019-11-04

## 2019-11-04 RX ORDER — CHOLECALCIFEROL (VITAMIN D3) 10 MCG
1 TABLET ORAL
Status: DISCONTINUED | OUTPATIENT
Start: 2019-11-04 | End: 2019-11-08 | Stop reason: HOSPADM

## 2019-11-04 RX ORDER — CARVEDILOL 25 MG/1
25 TABLET ORAL 2 TIMES DAILY
Status: DISCONTINUED | OUTPATIENT
Start: 2019-11-04 | End: 2019-11-08 | Stop reason: HOSPADM

## 2019-11-04 RX ORDER — CHOLECALCIFEROL (VITAMIN D3) 125 MCG
1 CAPSULE ORAL EVERY MORNING
Status: DISCONTINUED | OUTPATIENT
Start: 2019-11-05 | End: 2019-11-04 | Stop reason: CLARIF

## 2019-11-04 RX ORDER — CHOLECALCIFEROL (VITAMIN D3) 50 MCG
2000 TABLET ORAL EVERY MORNING
Status: DISCONTINUED | OUTPATIENT
Start: 2019-11-05 | End: 2019-11-08 | Stop reason: HOSPADM

## 2019-11-04 RX ORDER — LACTOBACILLUS RHAMNOSUS GG 10B CELL
1 CAPSULE ORAL EVERY MORNING
Status: DISCONTINUED | OUTPATIENT
Start: 2019-11-05 | End: 2019-11-08 | Stop reason: HOSPADM

## 2019-11-04 RX ORDER — FAMOTIDINE 20 MG/1
20 TABLET, FILM COATED ORAL EVERY MORNING
Status: DISCONTINUED | OUTPATIENT
Start: 2019-11-05 | End: 2019-11-08 | Stop reason: HOSPADM

## 2019-11-04 RX ADMIN — DEXTROSE MONOHYDRATE 12.5 G: 25 INJECTION, SOLUTION INTRAVENOUS at 22:23

## 2019-11-04 RX ADMIN — CARVEDILOL 25 MG: 25 TABLET, FILM COATED ORAL at 20:28

## 2019-11-04 RX ADMIN — DEXTROSE MONOHYDRATE 12.5 G: 25 INJECTION, SOLUTION INTRAVENOUS at 23:56

## 2019-11-04 RX ADMIN — INSULIN HUMAN 6 UNITS: 100 INJECTION, SOLUTION PARENTERAL at 15:50

## 2019-11-04 RX ADMIN — VANCOMYCIN HYDROCHLORIDE 1000 MG: 1 INJECTION, POWDER, LYOPHILIZED, FOR SOLUTION INTRAVENOUS at 18:00

## 2019-11-04 RX ADMIN — INSULIN GLARGINE 16 UNITS: 100 INJECTION, SOLUTION SUBCUTANEOUS at 20:24

## 2019-11-04 RX ADMIN — INSULIN HUMAN 10 UNITS: 100 INJECTION, SOLUTION PARENTERAL at 17:56

## 2019-11-04 RX ADMIN — CALCIUM ACETATE 667 MG: 667 CAPSULE ORAL at 22:31

## 2019-11-04 RX ADMIN — INSULIN LISPRO 10 UNITS: 100 INJECTION, SOLUTION INTRAVENOUS; SUBCUTANEOUS at 20:24

## 2019-11-04 RX ADMIN — AMPICILLIN SODIUM AND SULBACTAM SODIUM 3 G: 2; 1 INJECTION, POWDER, FOR SOLUTION INTRAMUSCULAR; INTRAVENOUS at 15:47

## 2019-11-04 ASSESSMENT — PAIN DESCRIPTION - PAIN TYPE: TYPE: ACUTE PAIN

## 2019-11-04 ASSESSMENT — ENCOUNTER SYMPTOMS
SHORTNESS OF BREATH: 0
NAUSEA: 0
BLOOD IN STOOL: 0
RHINORRHEA: 0
EYE DISCHARGE: 1
DIARRHEA: 0
COUGH: 0
CHEST TIGHTNESS: 0
VOMITING: 0
SORE THROAT: 0
ABDOMINAL PAIN: 0
CONSTIPATION: 0
BACK PAIN: 0
WHEEZING: 0

## 2019-11-04 ASSESSMENT — PAIN SCALES - GENERAL
PAINLEVEL_OUTOF10: 6
PAINLEVEL_OUTOF10: 0

## 2019-11-04 ASSESSMENT — PAIN DESCRIPTION - LOCATION: LOCATION: FACE

## 2019-11-05 LAB
ALBUMIN SERPL-MCNC: 3.9 G/DL (ref 3.5–5.2)
ALP BLD-CCNC: 72 U/L (ref 35–104)
ALT SERPL-CCNC: 9 U/L (ref 0–32)
ANION GAP SERPL CALCULATED.3IONS-SCNC: 17 MMOL/L (ref 7–16)
ANTISTREPTOLYSIN-O: 27 IU/ML (ref 0–200)
AST SERPL-CCNC: 16 U/L (ref 0–31)
BASOPHILS ABSOLUTE: 0.02 E9/L (ref 0–0.2)
BASOPHILS RELATIVE PERCENT: 0.3 % (ref 0–2)
BILIRUB SERPL-MCNC: 0.4 MG/DL (ref 0–1.2)
BUN BLDV-MCNC: 36 MG/DL (ref 6–20)
C-REACTIVE PROTEIN: 0.1 MG/DL (ref 0–0.4)
CALCIUM SERPL-MCNC: 8.8 MG/DL (ref 8.6–10.2)
CHLORIDE BLD-SCNC: 91 MMOL/L (ref 98–107)
CO2: 28 MMOL/L (ref 22–29)
CREAT SERPL-MCNC: 12.7 MG/DL (ref 0.5–1)
EOSINOPHILS ABSOLUTE: 0.24 E9/L (ref 0.05–0.5)
EOSINOPHILS RELATIVE PERCENT: 3.4 % (ref 0–6)
GFR AFRICAN AMERICAN: 4
GFR NON-AFRICAN AMERICAN: 4 ML/MIN/1.73
GLUCOSE BLD-MCNC: 79 MG/DL (ref 74–99)
HBA1C MFR BLD: 9.3 % (ref 4–5.6)
HCT VFR BLD CALC: 29.8 % (ref 34–48)
HEMOGLOBIN: 10.4 G/DL (ref 11.5–15.5)
IMMATURE GRANULOCYTES #: 0.03 E9/L
IMMATURE GRANULOCYTES %: 0.4 % (ref 0–5)
LYMPHOCYTES ABSOLUTE: 2.29 E9/L (ref 1.5–4)
LYMPHOCYTES RELATIVE PERCENT: 32.8 % (ref 20–42)
MCH RBC QN AUTO: 32.9 PG (ref 26–35)
MCHC RBC AUTO-ENTMCNC: 34.9 % (ref 32–34.5)
MCV RBC AUTO: 94.3 FL (ref 80–99.9)
METER GLUCOSE: 101 MG/DL (ref 74–99)
METER GLUCOSE: 145 MG/DL (ref 74–99)
METER GLUCOSE: 257 MG/DL (ref 74–99)
METER GLUCOSE: 311 MG/DL (ref 74–99)
METER GLUCOSE: 48 MG/DL (ref 74–99)
METER GLUCOSE: 54 MG/DL (ref 74–99)
METER GLUCOSE: 65 MG/DL (ref 74–99)
METER GLUCOSE: 72 MG/DL (ref 74–99)
METER GLUCOSE: 81 MG/DL (ref 74–99)
METER GLUCOSE: 83 MG/DL (ref 74–99)
METER GLUCOSE: 84 MG/DL (ref 74–99)
METER GLUCOSE: 86 MG/DL (ref 74–99)
METER GLUCOSE: 91 MG/DL (ref 74–99)
MONOCYTES ABSOLUTE: 0.46 E9/L (ref 0.1–0.95)
MONOCYTES RELATIVE PERCENT: 6.6 % (ref 2–12)
NEUTROPHILS ABSOLUTE: 3.94 E9/L (ref 1.8–7.3)
NEUTROPHILS RELATIVE PERCENT: 56.5 % (ref 43–80)
PDW BLD-RTO: 14.4 FL (ref 11.5–15)
PLATELET # BLD: 205 E9/L (ref 130–450)
PMV BLD AUTO: 10.3 FL (ref 7–12)
POTASSIUM SERPL-SCNC: 4.8 MMOL/L (ref 3.5–5)
PROCALCITONIN: 0.54 NG/ML (ref 0–0.08)
RBC # BLD: 3.16 E12/L (ref 3.5–5.5)
SODIUM BLD-SCNC: 136 MMOL/L (ref 132–146)
TOTAL PROTEIN: 7.4 G/DL (ref 6.4–8.3)
VANCOMYCIN RANDOM: 22 MCG/ML (ref 5–40)
WBC # BLD: 7 E9/L (ref 4.5–11.5)

## 2019-11-05 PROCEDURE — 84145 PROCALCITONIN (PCT): CPT

## 2019-11-05 PROCEDURE — 80202 ASSAY OF VANCOMYCIN: CPT

## 2019-11-05 PROCEDURE — 36415 COLL VENOUS BLD VENIPUNCTURE: CPT

## 2019-11-05 PROCEDURE — 86060 ANTISTREPTOLYSIN O TITER: CPT

## 2019-11-05 PROCEDURE — 6370000000 HC RX 637 (ALT 250 FOR IP): Performed by: FAMILY MEDICINE

## 2019-11-05 PROCEDURE — 6370000000 HC RX 637 (ALT 250 FOR IP): Performed by: INTERNAL MEDICINE

## 2019-11-05 PROCEDURE — 2580000003 HC RX 258: Performed by: INTERNAL MEDICINE

## 2019-11-05 PROCEDURE — 2060000000 HC ICU INTERMEDIATE R&B

## 2019-11-05 PROCEDURE — 6360000002 HC RX W HCPCS: Performed by: INTERNAL MEDICINE

## 2019-11-05 PROCEDURE — 85025 COMPLETE CBC W/AUTO DIFF WBC: CPT

## 2019-11-05 PROCEDURE — 86140 C-REACTIVE PROTEIN: CPT

## 2019-11-05 PROCEDURE — 82962 GLUCOSE BLOOD TEST: CPT

## 2019-11-05 PROCEDURE — 5A1D70Z PERFORMANCE OF URINARY FILTRATION, INTERMITTENT, LESS THAN 6 HOURS PER DAY: ICD-10-PCS | Performed by: SURGERY

## 2019-11-05 PROCEDURE — 90935 HEMODIALYSIS ONE EVALUATION: CPT

## 2019-11-05 PROCEDURE — 80053 COMPREHEN METABOLIC PANEL: CPT

## 2019-11-05 PROCEDURE — 83036 HEMOGLOBIN GLYCOSYLATED A1C: CPT

## 2019-11-05 PROCEDURE — 99232 SBSQ HOSP IP/OBS MODERATE 35: CPT | Performed by: INTERNAL MEDICINE

## 2019-11-05 RX ORDER — PETROLATUM 42 G/100G
OINTMENT TOPICAL 2 TIMES DAILY PRN
Status: DISCONTINUED | OUTPATIENT
Start: 2019-11-05 | End: 2019-11-08 | Stop reason: HOSPADM

## 2019-11-05 RX ORDER — INSULIN GLARGINE 100 [IU]/ML
12 INJECTION, SOLUTION SUBCUTANEOUS NIGHTLY
Status: DISCONTINUED | OUTPATIENT
Start: 2019-11-05 | End: 2019-11-08 | Stop reason: HOSPADM

## 2019-11-05 RX ORDER — LIDOCAINE 40 MG/G
CREAM TOPICAL PRN
Status: DISCONTINUED | OUTPATIENT
Start: 2019-11-06 | End: 2019-11-08 | Stop reason: HOSPADM

## 2019-11-05 RX ADMIN — CARVEDILOL 25 MG: 25 TABLET, FILM COATED ORAL at 09:31

## 2019-11-05 RX ADMIN — INSULIN LISPRO 3 UNITS: 100 INJECTION, SOLUTION INTRAVENOUS; SUBCUTANEOUS at 18:22

## 2019-11-05 RX ADMIN — DEXTROSE MONOHYDRATE: 100 INJECTION, SOLUTION INTRAVENOUS at 00:15

## 2019-11-05 RX ADMIN — CHOLECALCIFEROL TAB 50 MCG (2000 UNIT) 2000 UNITS: 50 TAB at 09:31

## 2019-11-05 RX ADMIN — INSULIN LISPRO 4 UNITS: 100 INJECTION, SOLUTION INTRAVENOUS; SUBCUTANEOUS at 18:21

## 2019-11-05 RX ADMIN — CALCIUM ACETATE 667 MG: 667 CAPSULE ORAL at 18:08

## 2019-11-05 RX ADMIN — DEXTROSE MONOHYDRATE 12.5 G: 25 INJECTION, SOLUTION INTRAVENOUS at 00:57

## 2019-11-05 RX ADMIN — NEPHROCAP 1 MG: 1 CAP ORAL at 18:08

## 2019-11-05 RX ADMIN — CALCIUM ACETATE 667 MG: 667 CAPSULE ORAL at 12:44

## 2019-11-05 RX ADMIN — INSULIN LISPRO 2 UNITS: 100 INJECTION, SOLUTION INTRAVENOUS; SUBCUTANEOUS at 21:23

## 2019-11-05 RX ADMIN — INSULIN GLARGINE 12 UNITS: 100 INJECTION, SOLUTION SUBCUTANEOUS at 21:22

## 2019-11-05 RX ADMIN — FAMOTIDINE 20 MG: 20 TABLET, FILM COATED ORAL at 09:31

## 2019-11-05 RX ADMIN — INSULIN LISPRO 1 UNITS: 100 INJECTION, SOLUTION INTRAVENOUS; SUBCUTANEOUS at 12:44

## 2019-11-05 RX ADMIN — CALCIUM ACETATE 667 MG: 667 CAPSULE ORAL at 09:30

## 2019-11-05 RX ADMIN — Medication 1 CAPSULE: at 09:31

## 2019-11-05 RX ADMIN — VANCOMYCIN HYDROCHLORIDE 750 MG: 1 INJECTION, POWDER, LYOPHILIZED, FOR SOLUTION INTRAVENOUS at 18:08

## 2019-11-05 RX ADMIN — CARVEDILOL 25 MG: 25 TABLET, FILM COATED ORAL at 21:14

## 2019-11-05 RX ADMIN — AMLODIPINE BESYLATE 5 MG: 5 TABLET ORAL at 09:31

## 2019-11-05 ASSESSMENT — PAIN SCALES - GENERAL
PAINLEVEL_OUTOF10: 0

## 2019-11-06 ENCOUNTER — TELEPHONE (OUTPATIENT)
Dept: SURGERY | Age: 34
End: 2019-11-06

## 2019-11-06 ENCOUNTER — ANESTHESIA EVENT (OUTPATIENT)
Dept: OPERATING ROOM | Age: 34
DRG: 579 | End: 2019-11-06
Payer: MEDICARE

## 2019-11-06 LAB
ANION GAP SERPL CALCULATED.3IONS-SCNC: 13 MMOL/L (ref 7–16)
BUN BLDV-MCNC: 17 MG/DL (ref 6–20)
CALCIUM SERPL-MCNC: 8.6 MG/DL (ref 8.6–10.2)
CHLORIDE BLD-SCNC: 95 MMOL/L (ref 98–107)
CO2: 31 MMOL/L (ref 22–29)
CREAT SERPL-MCNC: 7.6 MG/DL (ref 0.5–1)
GFR AFRICAN AMERICAN: 7
GFR NON-AFRICAN AMERICAN: 7 ML/MIN/1.73
GLUCOSE BLD-MCNC: 182 MG/DL (ref 74–99)
HCT VFR BLD CALC: 31 % (ref 34–48)
HEMOGLOBIN: 10.4 G/DL (ref 11.5–15.5)
MAGNESIUM: 2.2 MG/DL (ref 1.6–2.6)
MCH RBC QN AUTO: 32.3 PG (ref 26–35)
MCHC RBC AUTO-ENTMCNC: 33.5 % (ref 32–34.5)
MCV RBC AUTO: 96.3 FL (ref 80–99.9)
METER GLUCOSE: 133 MG/DL (ref 74–99)
METER GLUCOSE: 141 MG/DL (ref 74–99)
METER GLUCOSE: 154 MG/DL (ref 74–99)
METER GLUCOSE: 166 MG/DL (ref 74–99)
METER GLUCOSE: 219 MG/DL (ref 74–99)
METER GLUCOSE: 431 MG/DL (ref 74–99)
METER GLUCOSE: 48 MG/DL (ref 74–99)
PARATHYROID HORMONE INTACT: 495 PG/ML (ref 15–65)
PDW BLD-RTO: 14.6 FL (ref 11.5–15)
PLATELET # BLD: 185 E9/L (ref 130–450)
PMV BLD AUTO: 10.6 FL (ref 7–12)
POTASSIUM SERPL-SCNC: 5.2 MMOL/L (ref 3.5–5)
RBC # BLD: 3.22 E12/L (ref 3.5–5.5)
SODIUM BLD-SCNC: 139 MMOL/L (ref 132–146)
VANCOMYCIN RANDOM: 32.2 MCG/ML (ref 5–40)
VITAMIN D 25-HYDROXY: 57 NG/ML (ref 30–100)
WBC # BLD: 5.2 E9/L (ref 4.5–11.5)

## 2019-11-06 PROCEDURE — 2500000003 HC RX 250 WO HCPCS: Performed by: STUDENT IN AN ORGANIZED HEALTH CARE EDUCATION/TRAINING PROGRAM

## 2019-11-06 PROCEDURE — 0J990ZZ DRAINAGE OF BUTTOCK SUBCUTANEOUS TISSUE AND FASCIA, OPEN APPROACH: ICD-10-PCS | Performed by: INTERNAL MEDICINE

## 2019-11-06 PROCEDURE — APPSS30 APP SPLIT SHARED TIME 16-30 MINUTES: Performed by: PHYSICIAN ASSISTANT

## 2019-11-06 PROCEDURE — 80048 BASIC METABOLIC PNL TOTAL CA: CPT

## 2019-11-06 PROCEDURE — 82962 GLUCOSE BLOOD TEST: CPT

## 2019-11-06 PROCEDURE — 85027 COMPLETE CBC AUTOMATED: CPT

## 2019-11-06 PROCEDURE — 6370000000 HC RX 637 (ALT 250 FOR IP): Performed by: INTERNAL MEDICINE

## 2019-11-06 PROCEDURE — 83735 ASSAY OF MAGNESIUM: CPT

## 2019-11-06 PROCEDURE — 83970 ASSAY OF PARATHORMONE: CPT

## 2019-11-06 PROCEDURE — 80202 ASSAY OF VANCOMYCIN: CPT

## 2019-11-06 PROCEDURE — 1200000000 HC SEMI PRIVATE

## 2019-11-06 PROCEDURE — 90935 HEMODIALYSIS ONE EVALUATION: CPT

## 2019-11-06 PROCEDURE — 36415 COLL VENOUS BLD VENIPUNCTURE: CPT

## 2019-11-06 PROCEDURE — 6370000000 HC RX 637 (ALT 250 FOR IP): Performed by: FAMILY MEDICINE

## 2019-11-06 PROCEDURE — 99232 SBSQ HOSP IP/OBS MODERATE 35: CPT | Performed by: INTERNAL MEDICINE

## 2019-11-06 PROCEDURE — 82306 VITAMIN D 25 HYDROXY: CPT

## 2019-11-06 PROCEDURE — 99222 1ST HOSP IP/OBS MODERATE 55: CPT | Performed by: SURGERY

## 2019-11-06 PROCEDURE — 99221 1ST HOSP IP/OBS SF/LOW 40: CPT | Performed by: SURGERY

## 2019-11-06 PROCEDURE — 2580000003 HC RX 258: Performed by: INTERNAL MEDICINE

## 2019-11-06 RX ORDER — LIDOCAINE HYDROCHLORIDE AND EPINEPHRINE 10; 10 MG/ML; UG/ML
20 INJECTION, SOLUTION INFILTRATION; PERINEURAL ONCE
Status: COMPLETED | OUTPATIENT
Start: 2019-11-06 | End: 2019-11-06

## 2019-11-06 RX ORDER — AMLODIPINE BESYLATE 10 MG/1
10 TABLET ORAL EVERY MORNING
Status: DISCONTINUED | OUTPATIENT
Start: 2019-11-07 | End: 2019-11-08 | Stop reason: HOSPADM

## 2019-11-06 RX ADMIN — CARVEDILOL 25 MG: 25 TABLET, FILM COATED ORAL at 21:10

## 2019-11-06 RX ADMIN — CHOLECALCIFEROL TAB 50 MCG (2000 UNIT) 2000 UNITS: 50 TAB at 12:15

## 2019-11-06 RX ADMIN — LIDOCAINE HYDROCHLORIDE,EPINEPHRINE BITARTRATE 20 ML: 10; .01 INJECTION, SOLUTION INFILTRATION; PERINEURAL at 11:32

## 2019-11-06 RX ADMIN — Medication 1 CAPSULE: at 12:14

## 2019-11-06 RX ADMIN — AMLODIPINE BESYLATE 5 MG: 5 TABLET ORAL at 12:15

## 2019-11-06 RX ADMIN — CARVEDILOL 25 MG: 25 TABLET, FILM COATED ORAL at 12:14

## 2019-11-06 RX ADMIN — CALCIUM ACETATE 667 MG: 667 CAPSULE ORAL at 16:36

## 2019-11-06 RX ADMIN — INSULIN GLARGINE 6 UNITS: 100 INJECTION, SOLUTION SUBCUTANEOUS at 21:12

## 2019-11-06 RX ADMIN — DEXTROSE MONOHYDRATE 12.5 G: 25 INJECTION, SOLUTION INTRAVENOUS at 10:38

## 2019-11-06 RX ADMIN — INSULIN LISPRO 4 UNITS: 100 INJECTION, SOLUTION INTRAVENOUS; SUBCUTANEOUS at 17:14

## 2019-11-06 RX ADMIN — INSULIN LISPRO 4 UNITS: 100 INJECTION, SOLUTION INTRAVENOUS; SUBCUTANEOUS at 06:28

## 2019-11-06 RX ADMIN — INSULIN LISPRO 1 UNITS: 100 INJECTION, SOLUTION INTRAVENOUS; SUBCUTANEOUS at 06:25

## 2019-11-06 RX ADMIN — INSULIN LISPRO 6 UNITS: 100 INJECTION, SOLUTION INTRAVENOUS; SUBCUTANEOUS at 17:15

## 2019-11-06 RX ADMIN — FAMOTIDINE 20 MG: 20 TABLET, FILM COATED ORAL at 12:15

## 2019-11-06 ASSESSMENT — PAIN SCALES - GENERAL
PAINLEVEL_OUTOF10: 0

## 2019-11-06 ASSESSMENT — LIFESTYLE VARIABLES: SMOKING_STATUS: 1

## 2019-11-07 ENCOUNTER — ANESTHESIA (OUTPATIENT)
Dept: OPERATING ROOM | Age: 34
DRG: 579 | End: 2019-11-07
Payer: MEDICARE

## 2019-11-07 ENCOUNTER — APPOINTMENT (OUTPATIENT)
Dept: GENERAL RADIOLOGY | Age: 34
DRG: 579 | End: 2019-11-07
Payer: MEDICARE

## 2019-11-07 VITALS — SYSTOLIC BLOOD PRESSURE: 134 MMHG | DIASTOLIC BLOOD PRESSURE: 66 MMHG | OXYGEN SATURATION: 100 %

## 2019-11-07 PROBLEM — T82.590A DIALYSIS AV FISTULA MALFUNCTION, INITIAL ENCOUNTER (HCC): Status: ACTIVE | Noted: 2019-11-07

## 2019-11-07 LAB
ABO/RH: NORMAL
ALBUMIN SERPL-MCNC: 3.7 G/DL (ref 3.5–5.2)
ALP BLD-CCNC: 60 U/L (ref 35–104)
ALT SERPL-CCNC: 8 U/L (ref 0–32)
ANION GAP SERPL CALCULATED.3IONS-SCNC: 16 MMOL/L (ref 7–16)
ANTIBODY SCREEN: NORMAL
AST SERPL-CCNC: 12 U/L (ref 0–31)
BILIRUB SERPL-MCNC: 0.3 MG/DL (ref 0–1.2)
BUN BLDV-MCNC: 36 MG/DL (ref 6–20)
CALCIUM SERPL-MCNC: 8.7 MG/DL (ref 8.6–10.2)
CHLORIDE BLD-SCNC: 93 MMOL/L (ref 98–107)
CO2: 28 MMOL/L (ref 22–29)
CREAT SERPL-MCNC: 10.4 MG/DL (ref 0.5–1)
GFR AFRICAN AMERICAN: 5
GFR NON-AFRICAN AMERICAN: 5 ML/MIN/1.73
GLUCOSE BLD-MCNC: 203 MG/DL (ref 74–99)
GONADOTROPIN, CHORIONIC (HCG) QUANT: 1 MIU/ML
HCT VFR BLD CALC: 28.2 % (ref 34–48)
HCT VFR BLD CALC: 28.3 % (ref 34–48)
HEMOGLOBIN: 9.7 G/DL (ref 11.5–15.5)
HEMOGLOBIN: 9.7 G/DL (ref 11.5–15.5)
MAGNESIUM: 2.3 MG/DL (ref 1.6–2.6)
MCH RBC QN AUTO: 32.8 PG (ref 26–35)
MCHC RBC AUTO-ENTMCNC: 34.4 % (ref 32–34.5)
MCV RBC AUTO: 95.3 FL (ref 80–99.9)
METER GLUCOSE: 130 MG/DL (ref 74–99)
METER GLUCOSE: 165 MG/DL (ref 74–99)
METER GLUCOSE: 225 MG/DL (ref 74–99)
METER GLUCOSE: 373 MG/DL (ref 74–99)
METER GLUCOSE: 94 MG/DL (ref 74–99)
PDW BLD-RTO: 14.5 FL (ref 11.5–15)
PHOSPHORUS: 6 MG/DL (ref 2.5–4.5)
PLATELET # BLD: 153 E9/L (ref 130–450)
PMV BLD AUTO: 10.8 FL (ref 7–12)
POTASSIUM SERPL-SCNC: 5.2 MMOL/L (ref 3.5–5)
POTASSIUM SERPL-SCNC: 6.9 MMOL/L (ref 3.5–5)
RBC # BLD: 2.96 E12/L (ref 3.5–5.5)
SODIUM BLD-SCNC: 137 MMOL/L (ref 132–146)
TOTAL PROTEIN: 7 G/DL (ref 6.4–8.3)
WBC # BLD: 4.5 E9/L (ref 4.5–11.5)

## 2019-11-07 PROCEDURE — 86901 BLOOD TYPING SEROLOGIC RH(D): CPT

## 2019-11-07 PROCEDURE — 84702 CHORIONIC GONADOTROPIN TEST: CPT

## 2019-11-07 PROCEDURE — 82962 GLUCOSE BLOOD TEST: CPT

## 2019-11-07 PROCEDURE — 2500000003 HC RX 250 WO HCPCS: Performed by: SURGERY

## 2019-11-07 PROCEDURE — 36415 COLL VENOUS BLD VENIPUNCTURE: CPT

## 2019-11-07 PROCEDURE — 85018 HEMOGLOBIN: CPT

## 2019-11-07 PROCEDURE — 83735 ASSAY OF MAGNESIUM: CPT

## 2019-11-07 PROCEDURE — 3700000000 HC ANESTHESIA ATTENDED CARE: Performed by: SURGERY

## 2019-11-07 PROCEDURE — C1725 CATH, TRANSLUMIN NON-LASER: HCPCS | Performed by: SURGERY

## 2019-11-07 PROCEDURE — 3700000001 HC ADD 15 MINUTES (ANESTHESIA): Performed by: SURGERY

## 2019-11-07 PROCEDURE — 85014 HEMATOCRIT: CPT

## 2019-11-07 PROCEDURE — B51N1ZZ FLUOROSCOPY OF LEFT UPPER EXTREMITY VEINS USING LOW OSMOLAR CONTRAST: ICD-10-PCS | Performed by: SURGERY

## 2019-11-07 PROCEDURE — 6370000000 HC RX 637 (ALT 250 FOR IP): Performed by: INTERNAL MEDICINE

## 2019-11-07 PROCEDURE — C1769 GUIDE WIRE: HCPCS | Performed by: SURGERY

## 2019-11-07 PROCEDURE — 1200000000 HC SEMI PRIVATE

## 2019-11-07 PROCEDURE — 6370000000 HC RX 637 (ALT 250 FOR IP): Performed by: SURGERY

## 2019-11-07 PROCEDURE — 90935 HEMODIALYSIS ONE EVALUATION: CPT

## 2019-11-07 PROCEDURE — 6360000004 HC RX CONTRAST MEDICATION: Performed by: SURGERY

## 2019-11-07 PROCEDURE — 6370000000 HC RX 637 (ALT 250 FOR IP): Performed by: OPHTHALMOLOGY

## 2019-11-07 PROCEDURE — 2580000003 HC RX 258: Performed by: NURSE ANESTHETIST, CERTIFIED REGISTERED

## 2019-11-07 PROCEDURE — 86850 RBC ANTIBODY SCREEN: CPT

## 2019-11-07 PROCEDURE — C1894 INTRO/SHEATH, NON-LASER: HCPCS | Performed by: SURGERY

## 2019-11-07 PROCEDURE — 84100 ASSAY OF PHOSPHORUS: CPT

## 2019-11-07 PROCEDURE — 2580000003 HC RX 258: Performed by: SURGERY

## 2019-11-07 PROCEDURE — 86900 BLOOD TYPING SEROLOGIC ABO: CPT

## 2019-11-07 PROCEDURE — 2580000003 HC RX 258

## 2019-11-07 PROCEDURE — 6360000002 HC RX W HCPCS: Performed by: NURSE ANESTHETIST, CERTIFIED REGISTERED

## 2019-11-07 PROCEDURE — 6360000002 HC RX W HCPCS: Performed by: SURGERY

## 2019-11-07 PROCEDURE — 85027 COMPLETE CBC AUTOMATED: CPT

## 2019-11-07 PROCEDURE — 05783ZZ DILATION OF LEFT AXILLARY VEIN, PERCUTANEOUS APPROACH: ICD-10-PCS | Performed by: SURGERY

## 2019-11-07 PROCEDURE — 99232 SBSQ HOSP IP/OBS MODERATE 35: CPT | Performed by: INTERNAL MEDICINE

## 2019-11-07 PROCEDURE — 3600000013 HC SURGERY LEVEL 3 ADDTL 15MIN: Performed by: SURGERY

## 2019-11-07 PROCEDURE — 36902 INTRO CATH DIALYSIS CIRCUIT: CPT | Performed by: SURGERY

## 2019-11-07 PROCEDURE — 80053 COMPREHEN METABOLIC PANEL: CPT

## 2019-11-07 PROCEDURE — 7100000011 HC PHASE II RECOVERY - ADDTL 15 MIN: Performed by: SURGERY

## 2019-11-07 PROCEDURE — 2709999900 HC NON-CHARGEABLE SUPPLY: Performed by: SURGERY

## 2019-11-07 PROCEDURE — 3209999900 FLUORO FOR SURGICAL PROCEDURES

## 2019-11-07 PROCEDURE — 84132 ASSAY OF SERUM POTASSIUM: CPT

## 2019-11-07 PROCEDURE — 057C3ZZ DILATION OF LEFT BASILIC VEIN, PERCUTANEOUS APPROACH: ICD-10-PCS | Performed by: SURGERY

## 2019-11-07 PROCEDURE — 3600000003 HC SURGERY LEVEL 3 BASE: Performed by: SURGERY

## 2019-11-07 PROCEDURE — 7100000010 HC PHASE II RECOVERY - FIRST 15 MIN: Performed by: SURGERY

## 2019-11-07 RX ORDER — CEFAZOLIN SODIUM 1 G/50ML
SOLUTION INTRAVENOUS
Status: DISPENSED
Start: 2019-11-07 | End: 2019-11-07

## 2019-11-07 RX ORDER — PROPOFOL 10 MG/ML
INJECTION, EMULSION INTRAVENOUS CONTINUOUS PRN
Status: DISCONTINUED | OUTPATIENT
Start: 2019-11-07 | End: 2019-11-07 | Stop reason: SDUPTHER

## 2019-11-07 RX ORDER — SODIUM CHLORIDE 0.9 % (FLUSH) 0.9 %
SYRINGE (ML) INJECTION
Status: COMPLETED
Start: 2019-11-07 | End: 2019-11-07

## 2019-11-07 RX ORDER — NEOMYCIN SULFATE, POLYMYXIN B SULFATE, AND DEXAMETHASONE 3.5; 10000; 1 MG/G; [USP'U]/G; MG/G
OINTMENT OPHTHALMIC 3 TIMES DAILY
Status: DISCONTINUED | OUTPATIENT
Start: 2019-11-07 | End: 2019-11-08 | Stop reason: HOSPADM

## 2019-11-07 RX ORDER — CEFAZOLIN SODIUM 1 G/50ML
1 SOLUTION INTRAVENOUS ONCE
Status: COMPLETED | OUTPATIENT
Start: 2019-11-07 | End: 2019-11-07

## 2019-11-07 RX ORDER — SODIUM CHLORIDE 9 MG/ML
INJECTION, SOLUTION INTRAVENOUS CONTINUOUS PRN
Status: DISCONTINUED | OUTPATIENT
Start: 2019-11-07 | End: 2019-11-07 | Stop reason: SDUPTHER

## 2019-11-07 RX ORDER — FENTANYL CITRATE 50 UG/ML
INJECTION, SOLUTION INTRAMUSCULAR; INTRAVENOUS PRN
Status: DISCONTINUED | OUTPATIENT
Start: 2019-11-07 | End: 2019-11-07 | Stop reason: SDUPTHER

## 2019-11-07 RX ORDER — MIDAZOLAM HYDROCHLORIDE 1 MG/ML
INJECTION INTRAMUSCULAR; INTRAVENOUS PRN
Status: DISCONTINUED | OUTPATIENT
Start: 2019-11-07 | End: 2019-11-07 | Stop reason: SDUPTHER

## 2019-11-07 RX ORDER — PROPOFOL 10 MG/ML
INJECTION, EMULSION INTRAVENOUS PRN
Status: DISCONTINUED | OUTPATIENT
Start: 2019-11-07 | End: 2019-11-07 | Stop reason: SDUPTHER

## 2019-11-07 RX ADMIN — PETROLATUM: 42 OINTMENT TOPICAL at 08:10

## 2019-11-07 RX ADMIN — FENTANYL CITRATE 50 MCG: 50 INJECTION, SOLUTION INTRAMUSCULAR; INTRAVENOUS at 10:26

## 2019-11-07 RX ADMIN — INSULIN LISPRO 4 UNITS: 100 INJECTION, SOLUTION INTRAVENOUS; SUBCUTANEOUS at 16:59

## 2019-11-07 RX ADMIN — CALCIUM ACETATE 1334 MG: 667 CAPSULE ORAL at 17:16

## 2019-11-07 RX ADMIN — PROPOFOL 100 MCG/KG/MIN: 10 INJECTION, EMULSION INTRAVENOUS at 10:29

## 2019-11-07 RX ADMIN — PROPOFOL 30 MG: 10 INJECTION, EMULSION INTRAVENOUS at 10:28

## 2019-11-07 RX ADMIN — SODIUM CHLORIDE: 9 INJECTION, SOLUTION INTRAVENOUS at 10:19

## 2019-11-07 RX ADMIN — CARVEDILOL 25 MG: 25 TABLET, FILM COATED ORAL at 23:16

## 2019-11-07 RX ADMIN — CEFAZOLIN SODIUM 1 G: 1 SOLUTION INTRAVENOUS at 10:21

## 2019-11-07 RX ADMIN — MIDAZOLAM 1 MG: 1 INJECTION INTRAMUSCULAR; INTRAVENOUS at 10:19

## 2019-11-07 RX ADMIN — CARVEDILOL 25 MG: 25 TABLET, FILM COATED ORAL at 08:10

## 2019-11-07 RX ADMIN — INSULIN LISPRO 5 UNITS: 100 INJECTION, SOLUTION INTRAVENOUS; SUBCUTANEOUS at 16:58

## 2019-11-07 RX ADMIN — AMLODIPINE BESYLATE 10 MG: 10 TABLET ORAL at 08:10

## 2019-11-07 RX ADMIN — SODIUM CHLORIDE, PRESERVATIVE FREE: 5 INJECTION INTRAVENOUS at 09:00

## 2019-11-07 RX ADMIN — CALCIUM ACETATE 667 MG: 667 CAPSULE ORAL at 13:33

## 2019-11-07 RX ADMIN — INSULIN GLARGINE 12 UNITS: 100 INJECTION, SOLUTION SUBCUTANEOUS at 23:17

## 2019-11-07 RX ADMIN — NEOMYCIN SULFATE, POLYMYXIN B SULFATE AND DEXAMETHASONE: 3.5; 10000; 1 OINTMENT OPHTHALMIC at 23:55

## 2019-11-07 ASSESSMENT — PULMONARY FUNCTION TESTS
PIF_VALUE: 1
PIF_VALUE: 0
PIF_VALUE: 1
PIF_VALUE: 0
PIF_VALUE: 0
PIF_VALUE: 1
PIF_VALUE: 0
PIF_VALUE: 1
PIF_VALUE: 1
PIF_VALUE: 0
PIF_VALUE: 1
PIF_VALUE: 1
PIF_VALUE: 0
PIF_VALUE: 1
PIF_VALUE: 1

## 2019-11-07 ASSESSMENT — PAIN SCALES - GENERAL
PAINLEVEL_OUTOF10: 0

## 2019-11-08 VITALS
BODY MASS INDEX: 27.29 KG/M2 | WEIGHT: 135.36 LBS | DIASTOLIC BLOOD PRESSURE: 92 MMHG | TEMPERATURE: 98.6 F | OXYGEN SATURATION: 99 % | HEART RATE: 105 BPM | RESPIRATION RATE: 18 BRPM | SYSTOLIC BLOOD PRESSURE: 114 MMHG | HEIGHT: 59 IN

## 2019-11-08 PROBLEM — L23.3 ALLERGIC CONTACT DERMATITIS DUE TO DRUGS IN CONTACT WITH SKIN: Status: RESOLVED | Noted: 2019-11-04 | Resolved: 2019-11-08

## 2019-11-08 PROBLEM — T82.590A DIALYSIS AV FISTULA MALFUNCTION, INITIAL ENCOUNTER (HCC): Status: RESOLVED | Noted: 2019-11-07 | Resolved: 2019-11-08

## 2019-11-08 LAB
ANION GAP SERPL CALCULATED.3IONS-SCNC: 14 MMOL/L (ref 7–16)
BUN BLDV-MCNC: 18 MG/DL (ref 6–20)
CALCIUM SERPL-MCNC: 8.7 MG/DL (ref 8.6–10.2)
CHLORIDE BLD-SCNC: 97 MMOL/L (ref 98–107)
CO2: 27 MMOL/L (ref 22–29)
CREAT SERPL-MCNC: 6.1 MG/DL (ref 0.5–1)
GFR AFRICAN AMERICAN: 9
GFR NON-AFRICAN AMERICAN: 9 ML/MIN/1.73
GLUCOSE BLD-MCNC: 107 MG/DL (ref 74–99)
HCT VFR BLD CALC: 28.5 % (ref 34–48)
HEMOGLOBIN: 9.6 G/DL (ref 11.5–15.5)
MCH RBC QN AUTO: 32.2 PG (ref 26–35)
MCHC RBC AUTO-ENTMCNC: 33.7 % (ref 32–34.5)
MCV RBC AUTO: 95.6 FL (ref 80–99.9)
METER GLUCOSE: 155 MG/DL (ref 74–99)
METER GLUCOSE: 222 MG/DL (ref 74–99)
METER GLUCOSE: 270 MG/DL (ref 74–99)
PDW BLD-RTO: 14.7 FL (ref 11.5–15)
PLATELET # BLD: 133 E9/L (ref 130–450)
PMV BLD AUTO: 10.7 FL (ref 7–12)
POTASSIUM SERPL-SCNC: 4.8 MMOL/L (ref 3.5–5)
RBC # BLD: 2.98 E12/L (ref 3.5–5.5)
SODIUM BLD-SCNC: 138 MMOL/L (ref 132–146)
VANCOMYCIN RANDOM: 15.7 MCG/ML (ref 5–40)
WBC # BLD: 4.7 E9/L (ref 4.5–11.5)

## 2019-11-08 PROCEDURE — 99239 HOSP IP/OBS DSCHRG MGMT >30: CPT | Performed by: INTERNAL MEDICINE

## 2019-11-08 PROCEDURE — 6360000002 HC RX W HCPCS: Performed by: INTERNAL MEDICINE

## 2019-11-08 PROCEDURE — 85027 COMPLETE CBC AUTOMATED: CPT

## 2019-11-08 PROCEDURE — 6370000000 HC RX 637 (ALT 250 FOR IP): Performed by: SURGERY

## 2019-11-08 PROCEDURE — 82962 GLUCOSE BLOOD TEST: CPT

## 2019-11-08 PROCEDURE — 6370000000 HC RX 637 (ALT 250 FOR IP): Performed by: INTERNAL MEDICINE

## 2019-11-08 PROCEDURE — 99999 PR OFFICE/OUTPT VISIT,PROCEDURE ONLY: CPT | Performed by: INTERNAL MEDICINE

## 2019-11-08 PROCEDURE — 2580000003 HC RX 258: Performed by: INTERNAL MEDICINE

## 2019-11-08 PROCEDURE — 36415 COLL VENOUS BLD VENIPUNCTURE: CPT

## 2019-11-08 PROCEDURE — 80048 BASIC METABOLIC PNL TOTAL CA: CPT

## 2019-11-08 PROCEDURE — 90935 HEMODIALYSIS ONE EVALUATION: CPT

## 2019-11-08 PROCEDURE — 80202 ASSAY OF VANCOMYCIN: CPT

## 2019-11-08 RX ORDER — PETROLATUM 42 G/100G
OINTMENT TOPICAL
Qty: 1 TUBE | Refills: 0 | Status: SHIPPED | OUTPATIENT
Start: 2019-11-08 | End: 2019-12-05

## 2019-11-08 RX ORDER — INSULIN LISPRO 100 [IU]/ML
6 INJECTION, SOLUTION INTRAVENOUS; SUBCUTANEOUS
Qty: 1 PEN | Refills: 0
Start: 2019-11-08 | End: 2021-01-27

## 2019-11-08 RX ORDER — AMLODIPINE BESYLATE 10 MG/1
10 TABLET ORAL EVERY MORNING
Qty: 30 TABLET | Refills: 3 | Status: SHIPPED | OUTPATIENT
Start: 2019-11-09 | End: 2020-04-28

## 2019-11-08 RX ORDER — NEOMYCIN SULFATE, POLYMYXIN B SULFATE, AND DEXAMETHASONE 3.5; 10000; 1 MG/G; [USP'U]/G; MG/G
OINTMENT OPHTHALMIC 3 TIMES DAILY
Qty: 1 TUBE | Refills: 0 | Status: SHIPPED | OUTPATIENT
Start: 2019-11-08 | End: 2019-11-15

## 2019-11-08 RX ADMIN — INSULIN LISPRO 3 UNITS: 100 INJECTION, SOLUTION INTRAVENOUS; SUBCUTANEOUS at 13:30

## 2019-11-08 RX ADMIN — CHOLECALCIFEROL TAB 50 MCG (2000 UNIT) 2000 UNITS: 50 TAB at 12:58

## 2019-11-08 RX ADMIN — INSULIN LISPRO 4 UNITS: 100 INJECTION, SOLUTION INTRAVENOUS; SUBCUTANEOUS at 16:18

## 2019-11-08 RX ADMIN — AMLODIPINE BESYLATE 10 MG: 10 TABLET ORAL at 13:15

## 2019-11-08 RX ADMIN — CALCIUM ACETATE 1334 MG: 667 CAPSULE ORAL at 06:44

## 2019-11-08 RX ADMIN — VANCOMYCIN HYDROCHLORIDE 1000 MG: 1 INJECTION, POWDER, LYOPHILIZED, FOR SOLUTION INTRAVENOUS at 10:59

## 2019-11-08 RX ADMIN — NEOMYCIN SULFATE, POLYMYXIN B SULFATE AND DEXAMETHASONE: 3.5; 10000; 1 OINTMENT OPHTHALMIC at 13:00

## 2019-11-08 RX ADMIN — INSULIN LISPRO 4 UNITS: 100 INJECTION, SOLUTION INTRAVENOUS; SUBCUTANEOUS at 13:18

## 2019-11-08 RX ADMIN — Medication 1 CAPSULE: at 12:58

## 2019-11-08 RX ADMIN — FAMOTIDINE 20 MG: 20 TABLET, FILM COATED ORAL at 12:59

## 2019-11-08 RX ADMIN — CALCIUM ACETATE 1334 MG: 667 CAPSULE ORAL at 13:15

## 2019-11-08 ASSESSMENT — PAIN SCALES - GENERAL
PAINLEVEL_OUTOF10: 0

## 2019-11-09 LAB
BLOOD CULTURE, ROUTINE: NORMAL
CULTURE, BLOOD 2: NORMAL

## 2019-12-05 ENCOUNTER — HOSPITAL ENCOUNTER (OUTPATIENT)
Age: 34
Discharge: HOME | End: 2019-12-07
Payer: MEDICARE

## 2019-12-05 ENCOUNTER — OFFICE VISIT (OUTPATIENT)
Dept: FAMILY MEDICINE CLINIC | Age: 34
End: 2019-12-05
Payer: MEDICARE

## 2019-12-05 VITALS
TEMPERATURE: 98.6 F | BODY MASS INDEX: 29.64 KG/M2 | WEIGHT: 147 LBS | DIASTOLIC BLOOD PRESSURE: 60 MMHG | HEART RATE: 108 BPM | HEIGHT: 59 IN | OXYGEN SATURATION: 97 % | SYSTOLIC BLOOD PRESSURE: 136 MMHG

## 2019-12-05 DIAGNOSIS — L02.31 ABSCESS OF RIGHT BUTTOCK: ICD-10-CM

## 2019-12-05 DIAGNOSIS — J32.9 SINOBRONCHITIS: Primary | ICD-10-CM

## 2019-12-05 DIAGNOSIS — J40 SINOBRONCHITIS: Primary | ICD-10-CM

## 2019-12-05 PROCEDURE — 99214 OFFICE O/P EST MOD 30 MIN: CPT | Performed by: PHYSICIAN ASSISTANT

## 2019-12-05 PROCEDURE — 87077 CULTURE AEROBIC IDENTIFY: CPT

## 2019-12-05 PROCEDURE — 10060 I&D ABSCESS SIMPLE/SINGLE: CPT | Performed by: PHYSICIAN ASSISTANT

## 2019-12-05 PROCEDURE — G8427 DOCREV CUR MEDS BY ELIG CLIN: HCPCS | Performed by: PHYSICIAN ASSISTANT

## 2019-12-05 PROCEDURE — 87070 CULTURE OTHR SPECIMN AEROBIC: CPT

## 2019-12-05 PROCEDURE — G8419 CALC BMI OUT NRM PARAM NOF/U: HCPCS | Performed by: PHYSICIAN ASSISTANT

## 2019-12-05 PROCEDURE — 1111F DSCHRG MED/CURRENT MED MERGE: CPT | Performed by: PHYSICIAN ASSISTANT

## 2019-12-05 PROCEDURE — G8484 FLU IMMUNIZE NO ADMIN: HCPCS | Performed by: PHYSICIAN ASSISTANT

## 2019-12-05 PROCEDURE — 4004F PT TOBACCO SCREEN RCVD TLK: CPT | Performed by: PHYSICIAN ASSISTANT

## 2019-12-05 PROCEDURE — 87186 SC STD MICRODIL/AGAR DIL: CPT

## 2019-12-05 RX ORDER — ALBUTEROL SULFATE 90 UG/1
2 AEROSOL, METERED RESPIRATORY (INHALATION)
Qty: 1 INHALER | Refills: 0 | Status: SHIPPED
Start: 2019-12-05 | End: 2021-05-27 | Stop reason: SDUPTHER

## 2019-12-05 RX ORDER — BENZONATATE 200 MG/1
200 CAPSULE ORAL 3 TIMES DAILY PRN
Qty: 15 CAPSULE | Refills: 0 | Status: SHIPPED
Start: 2019-12-05 | End: 2020-10-23 | Stop reason: SDUPTHER

## 2019-12-05 RX ORDER — MEDROXYPROGESTERONE ACETATE 150 MG/ML
150 INJECTION, SUSPENSION INTRAMUSCULAR
Refills: 3 | COMMUNITY
Start: 2019-11-17

## 2019-12-05 RX ORDER — DOXYCYCLINE HYCLATE 100 MG/1
100 CAPSULE ORAL 2 TIMES DAILY
Qty: 20 CAPSULE | Refills: 0 | Status: SHIPPED | OUTPATIENT
Start: 2019-12-05 | End: 2019-12-09

## 2019-12-08 LAB
ORGANISM: ABNORMAL
ORGANISM: ABNORMAL
WOUND/ABSCESS: ABNORMAL
WOUND/ABSCESS: ABNORMAL

## 2019-12-09 RX ORDER — AMPICILLIN 500 MG/1
500 CAPSULE ORAL 2 TIMES DAILY
Qty: 20 CAPSULE | Refills: 0 | Status: SHIPPED | OUTPATIENT
Start: 2019-12-09 | End: 2019-12-19

## 2019-12-09 RX ORDER — CEPHALEXIN 500 MG/1
500 CAPSULE ORAL 2 TIMES DAILY
Qty: 20 CAPSULE | Refills: 0 | Status: SHIPPED | OUTPATIENT
Start: 2019-12-09 | End: 2019-12-09

## 2019-12-09 RX ORDER — CEFDINIR 300 MG/1
CAPSULE ORAL
Qty: 7 CAPSULE | Refills: 0 | Status: SHIPPED
Start: 2019-12-09 | End: 2020-02-25 | Stop reason: ALTCHOICE

## 2020-02-25 ENCOUNTER — OFFICE VISIT (OUTPATIENT)
Dept: FAMILY MEDICINE CLINIC | Age: 35
End: 2020-02-25
Payer: MEDICARE

## 2020-02-25 ENCOUNTER — TELEPHONE (OUTPATIENT)
Dept: SURGERY | Age: 35
End: 2020-02-25

## 2020-02-25 VITALS
TEMPERATURE: 98.2 F | SYSTOLIC BLOOD PRESSURE: 108 MMHG | DIASTOLIC BLOOD PRESSURE: 68 MMHG | HEIGHT: 59 IN | HEART RATE: 99 BPM | WEIGHT: 138.5 LBS | RESPIRATION RATE: 16 BRPM | OXYGEN SATURATION: 95 % | BODY MASS INDEX: 27.92 KG/M2

## 2020-02-25 PROBLEM — E53.9 VITAMIN B DEFICIENCY, UNSPECIFIED: Status: ACTIVE | Noted: 2020-02-25

## 2020-02-25 PROCEDURE — 3046F HEMOGLOBIN A1C LEVEL >9.0%: CPT | Performed by: NURSE PRACTITIONER

## 2020-02-25 PROCEDURE — G0438 PPPS, INITIAL VISIT: HCPCS | Performed by: NURSE PRACTITIONER

## 2020-02-25 PROCEDURE — 83036 HEMOGLOBIN GLYCOSYLATED A1C: CPT | Performed by: NURSE PRACTITIONER

## 2020-02-25 PROCEDURE — G8484 FLU IMMUNIZE NO ADMIN: HCPCS | Performed by: NURSE PRACTITIONER

## 2020-02-25 RX ORDER — FOLIC ACID/VIT B COMPLEX AND C 0.8 MG
1 TABLET ORAL
Qty: 30 TABLET | Refills: 3 | Status: SHIPPED
Start: 2020-02-25 | End: 2021-03-12

## 2020-02-25 RX ORDER — FAMOTIDINE 20 MG/1
20 TABLET, FILM COATED ORAL EVERY MORNING
Qty: 30 TABLET | Refills: 3 | Status: SHIPPED
Start: 2020-02-25 | End: 2022-06-21 | Stop reason: SDUPTHER

## 2020-02-25 ASSESSMENT — ENCOUNTER SYMPTOMS
SHORTNESS OF BREATH: 0
COLOR CHANGE: 0
EYE PAIN: 0
COUGH: 0
CHEST TIGHTNESS: 0
VOMITING: 0
CONSTIPATION: 0
NAUSEA: 0
SORE THROAT: 0
WHEEZING: 0
DIARRHEA: 0
SINUS PAIN: 0

## 2020-02-25 ASSESSMENT — PATIENT HEALTH QUESTIONNAIRE - PHQ9
SUM OF ALL RESPONSES TO PHQ QUESTIONS 1-9: 2
SUM OF ALL RESPONSES TO PHQ QUESTIONS 1-9: 2

## 2020-02-25 NOTE — PATIENT INSTRUCTIONS
Personalized Preventive Plan for Yessi Phelan - 2/25/2020  Medicare offers a range of preventive health benefits. Some of the tests and screenings are paid in full while other may be subject to a deductible, co-insurance, and/or copay. Some of these benefits include a comprehensive review of your medical history including lifestyle, illnesses that may run in your family, and various assessments and screenings as appropriate. After reviewing your medical record and screening and assessments performed today your provider may have ordered immunizations, labs, imaging, and/or referrals for you. A list of these orders (if applicable) as well as your Preventive Care list are included within your After Visit Summary for your review. Other Preventive Recommendations:    · A preventive eye exam performed by an eye specialist is recommended every 1-2 years to screen for glaucoma; cataracts, macular degeneration, and other eye disorders. · A preventive dental visit is recommended every 6 months. · Try to get at least 150 minutes of exercise per week or 10,000 steps per day on a pedometer . · Order or download the FREE \"Exercise & Physical Activity: Your Everyday Guide\" from [employer]. Call 3-101.447.2290 or search [employer] online. · You need 7250-0099 mg of calcium and 9679-7071 IU of vitamin D per day. It is possible to meet your calcium requirement with diet alone, but a vitamin D supplement is usually necessary to meet this goal.  · When exposed to the sun, use a sunscreen that protects against both UVA and UVB radiation with an SPF of 30 or greater. Reapply every 2 to 3 hours or after sweating, drying off with a towel, or swimming. · Always wear a seat belt when traveling in a car. Always wear a helmet when riding a bicycle or motorcycle.

## 2020-02-25 NOTE — PROGRESS NOTES
Medicare Annual Wellness Visit  Name: Yahir Hobbs Date: 2020   MRN: [id number] Sex: Female   Age: 27 y.o. Ethnicity: Non-/Non    : 1985 Race: Juju Dickerson is here for Medicare AWV    Screenings for behavioral, psychosocial and functional/safety risks, and cognitive dysfunction are all negative except as indicated below. These results, as well as other patient data from the [address] form, are documented in Flowsheets linked to this Encounter. No Known Allergies      Prior to Visit Medications    Medication Sig Taking? Authorizing Provider   medroxyPROGESTERone (DEPO-PROVERA) 150 MG/ML injection TO BE BROUGHT TO PHYSICIAN FOR INJECTION Yes Historical Provider, MD   benzonatate (TESSALON) 200 MG capsule Take 1 capsule by mouth 3 times daily as needed for Cough Yes Caro Elmore PA-C   albuterol sulfate  (90 Base) MCG/ACT inhaler Inhale 2 puffs into the lungs every 4-6 hours as needed for Wheezing or Shortness of Breath Yes Caro Elmore PA-C   insulin glargine (TOUJEO SOLOSTAR) 300 UNIT/ML injection pen Inject 16 Units into the skin nightly Yes Suri Rosales MD   insulin lispro, 1 Unit Dial, (HUMALOG KWIKPEN) 100 UNIT/ML SOPN Inject 6 Units into the skin 3 times daily (before meals) Yes Suri Rosales MD   amLODIPine (NORVASC) 10 MG tablet Take 1 tablet by mouth every morning Yes Suri Rosales MD   calcium acetate (PHOSLO) 667 MG capsule Take 2 capsules by mouth 3 times daily (with meals) Yes Suri Rosales MD   carvedilol (COREG) 25 MG tablet TK 1 T PO BID Yes Historical Provider, MD   FREESTYLE LITE strip TEST QID Yes Historical Provider, MD   Alcohol Swabs (ALCOHOL PREP) 70 % PADS USE 1 PAD QID Yes Historical Provider, MD   vitamin B-12 (CYANOCOBALAMIN) 100 MCG tablet Vitamin B-12  100 mcg tablet   TK 1 T PO QD Yes Historical Provider, MD   Insulin Syringe-Needle U-100 ([employer] INSULIN SYRINGE) 31G X Negative for cold intolerance, heat intolerance, polydipsia, polyphagia and polyuria. Genitourinary: Negative for difficulty urinating. Musculoskeletal: Negative for arthralgias and myalgias. Skin: Positive for wound (Currently draining & another underarm). Negative for color change and rash. Neurological: Negative for dizziness, light-headedness and headaches. Hematological: Positive for adenopathy (left arm axilla). Does not bruise/bleed easily. Psychiatric/Behavioral: Negative for agitation, decreased concentration, sleep disturbance and suicidal ideas. The patient is not nervous/anxious. Physical Exam  Vitals signs and nursing note reviewed. Constitutional:       General: She is not in acute distress. Appearance: Normal appearance. She is well-developed and normal weight. She is not ill-appearing, toxic-appearing or diaphoretic. HENT:      Head: Normocephalic and atraumatic. Right Ear: Tympanic membrane, ear canal and external ear normal. There is no impacted cerumen. Left Ear: Tympanic membrane, ear canal and external ear normal. There is no impacted cerumen. Nose: Nose normal. No congestion or rhinorrhea. Mouth/Throat:      Mouth: Mucous membranes are moist.      Pharynx: Oropharynx is clear. No oropharyngeal exudate. Eyes:      Extraocular Movements: Extraocular movements intact. Conjunctiva/sclera: Conjunctivae normal.      Pupils: Pupils are equal, round, and reactive to light. Neck:      Musculoskeletal: Normal range of motion and neck supple. Thyroid: No thyromegaly. Cardiovascular:      Rate and Rhythm: Normal rate and regular rhythm. Pulses: Normal pulses. Heart sounds: Normal heart sounds. No murmur. Pulmonary:      Effort: Pulmonary effort is normal. No respiratory distress. Breath sounds: Normal breath sounds. Abdominal:      General: Bowel sounds are normal.      Palpations: Abdomen is soft. Tenderness:  There is no abdominal tenderness. There is no guarding or rebound. Genitourinary:     Vagina: Normal.      Comments: Deferred. Musculoskeletal: Normal range of motion. Lymphadenopathy:      Cervical: No cervical adenopathy. Skin:     General: Skin is warm and dry. Capillary Refill: Capillary refill takes less than 2 seconds. Findings: Lesion (Right buttocks & left axilla) present. No bruising, erythema or rash. Neurological:      General: No focal deficit present. Mental Status: She is alert and oriented to person, place, and time. Mental status is at baseline. Cranial Nerves: No cranial nerve deficit. Sensory: No sensory deficit. Motor: No weakness. Coordination: Coordination normal.      Gait: Gait normal.   Psychiatric:         Mood and Affect: Mood normal.         Behavior: Behavior normal.         Thought Content: Thought content normal.         Judgment: Judgment normal.     Patient's complete Health Risk Assessment and screening values have been reviewed and are found in Flowsheets. The following problems were reviewed today and where indicated follow up appointments were made and/or referrals ordered. Positive Risk Factor Screenings with Interventions:     Substance Abuse:  Social History     Tobacco History     Smoking Status  Current Every Day Smoker Smoking Frequency  0.25 packs/day for 15 years (3.75 pk yrs) Smoking Tobacco Type  Cigarettes    Smokeless Tobacco Use  Never Used    Tobacco Comment  4 cig daily          Alcohol History     Alcohol Use Status  No          Drug Use     Drug Use Status  No          Sexual Activity     Sexually Active  Not Asked                  Substance Abuse Interventions:  · Tobacco abuse:  patient is not ready to work toward tobacco cessation at this time    General Health:  General  In general, how would you say your health is?: Fair  In the past 7 days, have you experienced any of the following?  New or Increased Pain, New or Increased

## 2020-03-16 ENCOUNTER — TELEPHONE (OUTPATIENT)
Dept: SURGERY | Age: 35
End: 2020-03-16

## 2020-03-17 ENCOUNTER — HOSPITAL ENCOUNTER (OUTPATIENT)
Age: 35
Discharge: HOME | End: 2020-03-19
Payer: MEDICARE

## 2020-03-17 ENCOUNTER — OFFICE VISIT (OUTPATIENT)
Dept: SURGERY | Age: 35
End: 2020-03-17
Payer: MEDICARE

## 2020-03-17 VITALS
HEIGHT: 59 IN | BODY MASS INDEX: 28.43 KG/M2 | OXYGEN SATURATION: 99 % | HEART RATE: 96 BPM | WEIGHT: 141 LBS | TEMPERATURE: 98.2 F | SYSTOLIC BLOOD PRESSURE: 125 MMHG | DIASTOLIC BLOOD PRESSURE: 70 MMHG

## 2020-03-17 PROBLEM — L73.2 HIDRADENITIS SUPPURATIVA: Status: ACTIVE | Noted: 2020-03-17

## 2020-03-17 PROBLEM — L02.91 ABSCESS: Status: ACTIVE | Noted: 2020-03-17

## 2020-03-17 PROBLEM — R05.8 DRY COUGH: Status: ACTIVE | Noted: 2020-03-17

## 2020-03-17 PROBLEM — R09.81 NASAL CONGESTION: Status: ACTIVE | Noted: 2020-03-17

## 2020-03-17 PROCEDURE — 87075 CULTR BACTERIA EXCEPT BLOOD: CPT

## 2020-03-17 PROCEDURE — 87070 CULTURE OTHR SPECIMN AEROBIC: CPT

## 2020-03-17 PROCEDURE — 10060 I&D ABSCESS SIMPLE/SINGLE: CPT | Performed by: SURGERY

## 2020-03-17 PROCEDURE — G8419 CALC BMI OUT NRM PARAM NOF/U: HCPCS | Performed by: SURGERY

## 2020-03-17 PROCEDURE — 99203 OFFICE O/P NEW LOW 30 MIN: CPT | Performed by: SURGERY

## 2020-03-17 PROCEDURE — 99204 OFFICE O/P NEW MOD 45 MIN: CPT | Performed by: SURGERY

## 2020-03-17 PROCEDURE — 87205 SMEAR GRAM STAIN: CPT

## 2020-03-17 PROCEDURE — G8427 DOCREV CUR MEDS BY ELIG CLIN: HCPCS | Performed by: SURGERY

## 2020-03-17 PROCEDURE — 10061 I&D ABSCESS COMP/MULTIPLE: CPT | Performed by: SURGERY

## 2020-03-17 PROCEDURE — G8484 FLU IMMUNIZE NO ADMIN: HCPCS | Performed by: SURGERY

## 2020-03-17 RX ORDER — CLINDAMYCIN HYDROCHLORIDE 300 MG/1
300 CAPSULE ORAL 2 TIMES DAILY
Qty: 20 CAPSULE | Refills: 0 | Status: SHIPPED | OUTPATIENT
Start: 2020-03-17 | End: 2020-03-27

## 2020-03-17 ASSESSMENT — ENCOUNTER SYMPTOMS
SORE THROAT: 1
COUGH: 1
GASTROINTESTINAL NEGATIVE: 1

## 2020-03-17 NOTE — PROGRESS NOTES
GeneralSurgery Consult     Patient's Name/Date of Birth: Dalton Lackey / 1985    Date: 2020     KEMI Munoz - PEDRO    Chief Complaint   Patient presents with    Abscess     left buttock abscess - feels like it may burst - bump under left arm, very painful - one under right arm but not as bad as the others    Other     states she's had them both for a while, they had both burst after a while but are still present     Other     states the buttock wound rates an 8/10 in pain       HPI: Patient presents for  A consult for cutaneous abscesses. Lesions are located in the bilateral axilla and  right sided buttock. Onset was several months ago. Nov she had the one on her gluteus drained and packed. She was due to see me in the office and then missed her apt as she was in the hospital. The abscess then came back a month ago and  have waxed and waned. Abscess has associated symptoms of spontaneous drainage, pain, T max 99F. Patient does have previous history of cutaneous abscesses in her inguinal creases . Patient does have diabetes. She also states over the last few days she has had a running nose, sore throat and a dry cough. T max 99F . No sick contacts. Past Medical History:   Diagnosis Date    JOLLY [facility]acute kidney [facility]) ([facility] 2016    AVF (arteriovenous fistula) ([facility] 2018    let arm    Chronic kidney disease     Dialysis AV fistula malfunction, initial encounter ([facility] 2019    DM type 1 (diabetes mellitus, type 1) ([facility]     Encounter regarding vascular access for dialysis for ESRD ([facility] 2018    ESRD (end stage renal disease) ([facility]     Hemodialysis patient ([facility]     amrita dawson  / graft in left arm    Hypertension     Tobacco abuse 2016      Past Surgical History:   Procedure Laterality Date     SECTION  2013    CYST INCISION AND DRAINAGE  2011    perirectal abscess. [facility].  Dr. Kate Matthews 100 UNIT/ML SOPN Inject 6 Units into the skin 3 times daily (before meals) 1 pen 0    amLODIPine (NORVASC) 10 MG tablet Take 1 tablet by mouth every morning 30 tablet 3    calcium acetate (PHOSLO) 667 MG capsule Take 2 capsules by mouth 3 times daily (with meals) 60 capsule 3    carvedilol (COREG) 25 MG tablet TK 1 T PO BID  5    FREESTYLE LITE strip TEST QID  2    Alcohol Swabs (ALCOHOL PREP) 70 % PADS USE 1 PAD QID  3    vitamin B-12 (CYANOCOBALAMIN) 100 MCG tablet Vitamin B-12  100 mcg tablet   TK 1 T PO QD      Insulin Syringe-Needle U-100 ([employer] INSULIN SYRINGE) 31G X 5/16\" 1 ML MISC 1 each by Does not apply route daily 100 each 3    glucose 4 g chewable tablet Take 1 tablet by mouth as needed for Low blood sugar 60 tablet 0    Lactobacillus (PROBIOTIC ACIDOPHILUS) CAPS Take 1 capsule by mouth every morning      Cholecalciferol (VITAMIN D3) 2000 units CAPS Take 1 capsule by mouth every morning LD 8/22/208       No current facility-administered medications for this visit. Social History     Tobacco Use    Smoking status: Current Every Day Smoker     Packs/day: 0.25     Years: 15.00     Pack years: 3.75     Types: Cigarettes    Smokeless tobacco: Never Used    Tobacco comment: 4 cig daily   Substance Use Topics    Alcohol use: Yes     Alcohol/week: 0.0 standard drinks     Comment: occ        Review of Systems   Constitutional: Negative for fever ( t max 99F). Night sweats- says it from her Hx DM    HENT: Positive for congestion and sore throat. Respiratory: Positive for cough. Cardiovascular: Negative. Gastrointestinal: Negative. Genitourinary: Negative. Neurological: Negative for seizures and syncope.        Labs:  CBC with Differential:  Lab Results   Component Value Date    WBC 4.7 11/08/2019    RBC 2.98 11/08/2019    RBC  08/19/2016      RBC           [id number]    transfused   08/21/16  07:02  SCC    RBC  08/19/2016      RBC           [id number] released     08/23/16  00:54  SCC    HGB 9.6 11/08/2019    HCT 28.5 11/08/2019     11/08/2019    MCV 95.6 11/08/2019    MCH 32.2 11/08/2019    MCHC 33.7 11/08/2019    RDW 14.7 11/08/2019    SEGSPCT 53 10/08/2013    LYMPHOPCT 32.8 11/05/2019    MONOPCT 6.6 11/05/2019    BASOPCT 0.3 11/05/2019    MONOSABS 0.46 11/05/2019    LYMPHSABS 2.29 11/05/2019    EOSABS 0.24 11/05/2019    BASOSABS 0.02 11/05/2019     BMP:  Lab Results   Component Value Date     11/08/2019    K 4.8 11/08/2019    K 4.4 11/27/2018    CL 97 11/08/2019    CO2 27 11/08/2019    BUN 18 11/08/2019    LABALBU 3.7 11/07/2019    LABALBU 3.9 01/10/2011    CREATININE 6.1 11/08/2019    CALCIUM 8.7 11/08/2019    GFRAA 9 11/08/2019    LABGLOM 9 11/08/2019    GLUCOSE 107 11/08/2019    GLUCOSE 279 07/27/2011         Physical Exam  Constitutional:       General: She is not in acute distress. Appearance: She is not toxic-appearing. HENT:      Head: Normocephalic. Comments: Patient with mask on      Nose: Congestion present. Eyes:      Extraocular Movements: Extraocular movements intact. Pulmonary:      Effort: Pulmonary effort is normal. No respiratory distress. Comments: intermittent cough   Abdominal:      General: Abdomen is flat. There is no distension. Tenderness: There is no abdominal tenderness. Musculoskeletal:      Comments: .1cm left axillary abscess with chronic scar tissue, small , <0.5cm right axillary area of induration , no drainable  Abscess. Right gluteal cleft 1.5cm fluctuant abscess mild erythema    Skin:     General: Skin is warm. Neurological:      General: No focal deficit present. Mental Status: She is alert. Psychiatric:         Mood and Affect: Mood normal.         Assessment: Abscess Left axilla, very small right axilla and left gluteal cleft. Hidradenitis . Recent cough and nasal congestion     Plan:   1. I&D Abscess  2. Cultures  3. Abx   4.  Remove packing tomorrow night and then shower 5. Called patients PCP as patient has had a cough and nasal congestion, no fever. T max at home 99F. Recommendation of self Isolation and if any symptoms get worse, worsening cough, any chest pain, shortness of breath or fever to call the doctor and to go the flu clinic. Also told the patient that she needs to notify her Dialysis location of her recent dry cough and nasal congestion. Incision and Drainage Procedure Note    Indication: Left axillary abscess and right gluteal abscess     Procedure: The patient was positioned appropriately and the skin over the right gluteal abscess was prepped with betadine . An incision was then made over the apex of the lesion and approximately 3 cc of purulent material was expressed. Loculations were not present. The drainage cavity was then packed with sterile gauze. Left left axillary abscess was small in nature minor pressure during examination caused drainage of the wound the abscess size was not large enough to pack. The patient tolerated the procedure well.     Complications: None    Rosa Martins MD        Electronically signed by Rosa Martins MD

## 2020-03-20 LAB
ANAEROBIC CULTURE: ABNORMAL
ANAEROBIC CULTURE: ABNORMAL
GRAM STAIN RESULT: ABNORMAL
ORGANISM: ABNORMAL
WOUND/ABSCESS: ABNORMAL
WOUND/ABSCESS: ABNORMAL

## 2020-04-15 ENCOUNTER — TELEPHONE (OUTPATIENT)
Dept: FAMILY MEDICINE CLINIC | Age: 35
End: 2020-04-15

## 2020-04-15 NOTE — TELEPHONE ENCOUNTER
Patient has a spider bite and would like an antibiotic.   I told her to go to urgent care or ER she refused

## 2020-04-15 NOTE — PROGRESS NOTES
tablets/day x 3 days, then 2 tablets/day x 2 days, then 1 tablet/day x 2 days  -     PATIENT ADVISED THAT IF HER SYMPTOMS PROGRESS AT ALL BETWEEN NOW AND FOLLOW UP PLANNED FOR 4/20/2020 SHE IS TO GO TO ER IMMEDIATELY      Follow Up:  Return in about 3 days (around 4/20/2020), or Virtual Video visit, for follow up to assess response of face symptoms to medications. or sooner if necessary. Call or go to ED immediately if symptoms worsen or persist.    Educational materials and/or home exercises printed for patient's review and were included in patient instructions on his/her AfterVisit Summary and given to patient at the end of visit. Counseled regarding above diagnosis,including possible risks and complications,  especially if left uncontrolled. Counseled regarding the possible side effects, risks, benefits and alternatives to treatment; patient and/or guardian verbalizes understanding, agrees, feels comfortable with and wishes to proceed with above treatment plan. Advised patient tocall with any new medication issues, and read all Rx info from pharmacy to assureaware of all possible risks and side effects of medication before taking. Reviewed age and gender appropriate health screening exams and vaccinations. Advisedpatient regarding importance of keeping up with recommended health maintenance andto schedule as soon as possible if overdue, as this is important in assessing forundiagnosed pathology, especially cancer, as well as protecting against potentially harmful/life threatening disease. Patient and/or guardian verbalizes understandingand agrees with above counseling, assessment and plan. All questions answered. Carolina Yi MD        --Carolina Yi MD on @at 2:39 PM    An electronic signature was used to authenticate this note.

## 2020-04-17 ENCOUNTER — VIRTUAL VISIT (OUTPATIENT)
Dept: FAMILY MEDICINE CLINIC | Age: 35
End: 2020-04-17
Payer: MEDICARE

## 2020-04-17 VITALS — BODY MASS INDEX: 27.21 KG/M2 | WEIGHT: 135 LBS | HEIGHT: 59 IN

## 2020-04-17 PROCEDURE — G8427 DOCREV CUR MEDS BY ELIG CLIN: HCPCS | Performed by: FAMILY MEDICINE

## 2020-04-17 PROCEDURE — 99213 OFFICE O/P EST LOW 20 MIN: CPT | Performed by: FAMILY MEDICINE

## 2020-04-17 RX ORDER — ACETAMINOPHEN 160 MG
1 TABLET,DISINTEGRATING ORAL EVERY MORNING
Qty: 30 CAPSULE | Refills: 2 | Status: CANCELLED | OUTPATIENT
Start: 2020-04-17

## 2020-04-17 RX ORDER — ACETAMINOPHEN 160 MG
1 TABLET,DISINTEGRATING ORAL EVERY MORNING
Qty: 30 CAPSULE | Refills: 2 | Status: SHIPPED
Start: 2020-04-17 | End: 2020-07-30

## 2020-04-17 RX ORDER — DOXYCYCLINE HYCLATE 100 MG
100 TABLET ORAL 2 TIMES DAILY
Qty: 20 TABLET | Refills: 0 | Status: SHIPPED
Start: 2020-04-17 | End: 2020-04-28 | Stop reason: SDUPTHER

## 2020-04-17 RX ORDER — PREDNISONE 10 MG/1
10 TABLET ORAL DAILY
Qty: 31 TABLET | Refills: 0 | Status: SHIPPED
Start: 2020-04-17 | End: 2020-04-28 | Stop reason: SDUPTHER

## 2020-04-21 NOTE — PROGRESS NOTES
SURGICAL HISTORY  08/19/2016    pericardial window    OTHER SURGICAL HISTORY  08/23/2016     r tesio insertion  / remove 8/16/2018    OTHER SURGICAL HISTORY Left 02/17/2017    insertion a-v fistula left arm    SC ANASTOMOSIS,AV,ANY SITE Left 7/17/2018    REVISION AV FISTULA - LEFT ARM performed by Pelon Soto MD at [facility] NON-TUNNEL CV CATH N/A 5/10/2018    TESIO CATHETER INSERTION performed by Giorgi Carcamo MD at [address] PRERETINAL MEMBRANE Left 8/28/2018    PARS PLANA VITRECTOMY 25 GAUGE, VITREOUS HEMORRHAGE REMOVAL, ENDO LASER, AIR/FLUID  EXCHANGE LEFT EYE performed by Riki Palmer MD at [facility] OR   ,   Social History     Tobacco Use    Smoking status: Current Every Day Smoker     Packs/day: 0.25     Years: 15.00     Pack years: 3.75     Types: Cigarettes    Smokeless tobacco: Never Used    Tobacco comment: 4 cig daily   Substance Use Topics    Alcohol use:  Yes     Alcohol/week: 0.0 standard drinks     Comment: occ    Drug use: No   ,   Family History   Problem Relation Age of Onset    Stroke Mother     Cancer Father     Diabetes Paternal Aunt    ,   Immunization History   Administered Date(s) Administered    Tdap (Boostrix, Adacel) 03/30/2018   ,   Health Maintenance   Topic Date Due    Varicella vaccine (1 of 2 - 2-dose childhood series) 06/19/1985    Pneumococcal 0-64 years Vaccine (1 of 3 - PCV13) 06/19/1990    Diabetic foot exam  06/19/1994    Hepatitis B vaccine (1 of 3 - Risk 3-dose series) 06/19/2003    Cervical cancer screen  09/13/2014    Lipid screen  10/08/2014    A1C test (Diabetic or Prediabetic)  02/05/2020    Diabetic retinal exam  03/07/2020    Flu vaccine (Season Ended) 09/01/2020    Potassium monitoring  11/08/2020    Creatinine monitoring  11/08/2020    Annual Wellness Visit (AWV)  02/25/2021    DTaP/Tdap/Td vaccine (2 - Td) 03/30/2028    HIV screen  Completed    Hepatitis A vaccine  Aged C/ Troy Shannan 18

## 2020-04-28 ENCOUNTER — VIRTUAL VISIT (OUTPATIENT)
Dept: FAMILY MEDICINE CLINIC | Age: 35
End: 2020-04-28
Payer: MEDICARE

## 2020-04-28 VITALS — HEIGHT: 59 IN | BODY MASS INDEX: 27.21 KG/M2 | WEIGHT: 135 LBS

## 2020-04-28 PROCEDURE — 99213 OFFICE O/P EST LOW 20 MIN: CPT | Performed by: FAMILY MEDICINE

## 2020-04-28 PROCEDURE — 4004F PT TOBACCO SCREEN RCVD TLK: CPT | Performed by: FAMILY MEDICINE

## 2020-04-28 PROCEDURE — G8427 DOCREV CUR MEDS BY ELIG CLIN: HCPCS | Performed by: FAMILY MEDICINE

## 2020-04-28 PROCEDURE — G8419 CALC BMI OUT NRM PARAM NOF/U: HCPCS | Performed by: FAMILY MEDICINE

## 2020-04-28 RX ORDER — AMLODIPINE BESYLATE 5 MG/1
5 TABLET ORAL DAILY
Status: ON HOLD | COMMUNITY
Start: 2020-03-13 | End: 2021-05-22 | Stop reason: HOSPADM

## 2020-04-28 RX ORDER — PREDNISONE 10 MG/1
10 TABLET ORAL DAILY
Qty: 31 TABLET | Refills: 0 | Status: SHIPPED | OUTPATIENT
Start: 2020-04-28 | End: 2020-05-08

## 2020-04-28 RX ORDER — DIAPER,BRIEF,INFANT-TODD,DISP
EACH MISCELLANEOUS
COMMUNITY
Start: 2020-04-15 | End: 2021-01-27

## 2020-04-28 RX ORDER — IBUPROFEN 800 MG/1
TABLET ORAL
COMMUNITY
Start: 2020-02-04 | End: 2021-01-27

## 2020-04-28 RX ORDER — DOXYCYCLINE HYCLATE 100 MG
100 TABLET ORAL 2 TIMES DAILY
Qty: 20 TABLET | Refills: 0 | Status: SHIPPED | OUTPATIENT
Start: 2020-04-28 | End: 2020-05-08

## 2020-04-28 RX ORDER — LATANOPROST 50 UG/ML
SOLUTION/ DROPS OPHTHALMIC
COMMUNITY
Start: 2020-01-28 | End: 2021-01-27

## 2020-07-13 ENCOUNTER — APPOINTMENT (OUTPATIENT)
Dept: GENERAL RADIOLOGY | Age: 35
DRG: 811 | End: 2020-07-13
Payer: MEDICARE

## 2020-07-13 ENCOUNTER — HOSPITAL ENCOUNTER (EMERGENCY)
Age: 35
Discharge: LEFT BEFORE DISCHARGE | DRG: 811 | End: 2020-07-13
Attending: EMERGENCY MEDICINE | Admitting: FAMILY MEDICINE
Payer: MEDICARE

## 2020-07-13 VITALS
SYSTOLIC BLOOD PRESSURE: 176 MMHG | BODY MASS INDEX: 29.23 KG/M2 | HEART RATE: 98 BPM | DIASTOLIC BLOOD PRESSURE: 98 MMHG | TEMPERATURE: 97.4 F | HEIGHT: 59 IN | RESPIRATION RATE: 18 BRPM | WEIGHT: 145 LBS | OXYGEN SATURATION: 95 %

## 2020-07-13 PROBLEM — D64.9 SYMPTOMATIC ANEMIA: Status: ACTIVE | Noted: 2020-07-13

## 2020-07-13 LAB
ABO/RH: NORMAL
ANION GAP SERPL CALCULATED.3IONS-SCNC: 13 MMOL/L (ref 7–16)
ANTIBODY SCREEN: NORMAL
BASOPHILS ABSOLUTE: 0.01 E9/L (ref 0–0.2)
BASOPHILS RELATIVE PERCENT: 0.2 % (ref 0–2)
BLOOD BANK DISPENSE STATUS: NORMAL
BLOOD BANK PRODUCT CODE: NORMAL
BPU ID: NORMAL
BUN BLDV-MCNC: 42 MG/DL (ref 6–20)
CALCIUM SERPL-MCNC: 9.1 MG/DL (ref 8.6–10.2)
CHLORIDE BLD-SCNC: 95 MMOL/L (ref 98–107)
CO2: 30 MMOL/L (ref 22–29)
CREAT SERPL-MCNC: 12.9 MG/DL (ref 0.5–1)
DESCRIPTION BLOOD BANK: NORMAL
EKG ATRIAL RATE: 98 BPM
EKG P AXIS: 53 DEGREES
EKG P-R INTERVAL: 126 MS
EKG Q-T INTERVAL: 342 MS
EKG QRS DURATION: 72 MS
EKG QTC CALCULATION (BAZETT): 436 MS
EKG R AXIS: 9 DEGREES
EKG T AXIS: 59 DEGREES
EKG VENTRICULAR RATE: 98 BPM
EOSINOPHILS ABSOLUTE: 0.17 E9/L (ref 0.05–0.5)
EOSINOPHILS RELATIVE PERCENT: 3.4 % (ref 0–6)
GFR AFRICAN AMERICAN: 4
GFR NON-AFRICAN AMERICAN: 4 ML/MIN/1.73
GLUCOSE BLD-MCNC: 303 MG/DL (ref 74–99)
HCT VFR BLD CALC: 20.9 % (ref 34–48)
HEMOGLOBIN: 6.9 G/DL (ref 11.5–15.5)
IMMATURE GRANULOCYTES #: 0.01 E9/L
IMMATURE GRANULOCYTES %: 0.2 % (ref 0–5)
LYMPHOCYTES ABSOLUTE: 1.7 E9/L (ref 1.5–4)
LYMPHOCYTES RELATIVE PERCENT: 34.1 % (ref 20–42)
MCH RBC QN AUTO: 33.5 PG (ref 26–35)
MCHC RBC AUTO-ENTMCNC: 33 % (ref 32–34.5)
MCV RBC AUTO: 101.5 FL (ref 80–99.9)
MONOCYTES ABSOLUTE: 0.44 E9/L (ref 0.1–0.95)
MONOCYTES RELATIVE PERCENT: 8.8 % (ref 2–12)
NEUTROPHILS ABSOLUTE: 2.65 E9/L (ref 1.8–7.3)
NEUTROPHILS RELATIVE PERCENT: 53.3 % (ref 43–80)
PDW BLD-RTO: 15.8 FL (ref 11.5–15)
PLATELET # BLD: 148 E9/L (ref 130–450)
PMV BLD AUTO: 9.9 FL (ref 7–12)
POTASSIUM REFLEX MAGNESIUM: 4.6 MMOL/L (ref 3.5–5)
RBC # BLD: 2.06 E12/L (ref 3.5–5.5)
SODIUM BLD-SCNC: 138 MMOL/L (ref 132–146)
TROPONIN: 0.04 NG/ML (ref 0–0.03)
WBC # BLD: 5 E9/L (ref 4.5–11.5)

## 2020-07-13 PROCEDURE — 71046 X-RAY EXAM CHEST 2 VIEWS: CPT

## 2020-07-13 PROCEDURE — 86850 RBC ANTIBODY SCREEN: CPT

## 2020-07-13 PROCEDURE — 80048 BASIC METABOLIC PNL TOTAL CA: CPT

## 2020-07-13 PROCEDURE — 90935 HEMODIALYSIS ONE EVALUATION: CPT

## 2020-07-13 PROCEDURE — 86706 HEP B SURFACE ANTIBODY: CPT

## 2020-07-13 PROCEDURE — 86900 BLOOD TYPING SEROLOGIC ABO: CPT

## 2020-07-13 PROCEDURE — 36415 COLL VENOUS BLD VENIPUNCTURE: CPT

## 2020-07-13 PROCEDURE — 5A1D70Z PERFORMANCE OF URINARY FILTRATION, INTERMITTENT, LESS THAN 6 HOURS PER DAY: ICD-10-PCS | Performed by: INTERNAL MEDICINE

## 2020-07-13 PROCEDURE — 85025 COMPLETE CBC W/AUTO DIFF WBC: CPT

## 2020-07-13 PROCEDURE — 86923 COMPATIBILITY TEST ELECTRIC: CPT

## 2020-07-13 PROCEDURE — 93005 ELECTROCARDIOGRAM TRACING: CPT | Performed by: STUDENT IN AN ORGANIZED HEALTH CARE EDUCATION/TRAINING PROGRAM

## 2020-07-13 PROCEDURE — 87340 HEPATITIS B SURFACE AG IA: CPT

## 2020-07-13 PROCEDURE — 86901 BLOOD TYPING SEROLOGIC RH(D): CPT

## 2020-07-13 PROCEDURE — 1200000000 HC SEMI PRIVATE

## 2020-07-13 PROCEDURE — 99284 EMERGENCY DEPT VISIT MOD MDM: CPT

## 2020-07-13 PROCEDURE — P9016 RBC LEUKOCYTES REDUCED: HCPCS

## 2020-07-13 PROCEDURE — 36430 TRANSFUSION BLD/BLD COMPNT: CPT

## 2020-07-13 PROCEDURE — 2580000003 HC RX 258: Performed by: STUDENT IN AN ORGANIZED HEALTH CARE EDUCATION/TRAINING PROGRAM

## 2020-07-13 PROCEDURE — 84484 ASSAY OF TROPONIN QUANT: CPT

## 2020-07-13 PROCEDURE — 86803 HEPATITIS C AB TEST: CPT

## 2020-07-13 RX ORDER — SEVELAMER CARBONATE 800 MG/1
800 TABLET, FILM COATED ORAL
Status: DISCONTINUED | OUTPATIENT
Start: 2020-07-13 | End: 2020-07-13 | Stop reason: HOSPADM

## 2020-07-13 RX ORDER — 0.9 % SODIUM CHLORIDE 0.9 %
250 INTRAVENOUS SOLUTION INTRAVENOUS ONCE
Status: COMPLETED | OUTPATIENT
Start: 2020-07-13 | End: 2020-07-13

## 2020-07-13 RX ORDER — LIDOCAINE 40 MG/G
CREAM TOPICAL PRN
Status: DISCONTINUED | OUTPATIENT
Start: 2020-07-13 | End: 2020-07-13 | Stop reason: HOSPADM

## 2020-07-13 RX ADMIN — SODIUM CHLORIDE 250 ML: 9 INJECTION, SOLUTION INTRAVENOUS at 13:26

## 2020-07-13 ASSESSMENT — ENCOUNTER SYMPTOMS
ABDOMINAL PAIN: 0
SHORTNESS OF BREATH: 1
COUGH: 0

## 2020-07-13 NOTE — CONSULTS
Nephrology Consult Note  Patient's Name: Cecilio Boone  4:24 PM  2020    Nephrologist: Alin Cadena    Reason for Consult:  ESRD  Requesting Physician:  KEMI Reese CNP    Chief Complaint:  Low hgB    History Obtained From:  patient and past medical records    History of Present Ilness:    Cecilio Boone is a 38 y.o. female with prior history of ESRD sec to Type 1 DM  requiring RRT via Home Hemodialysis. Pt states over the last few weeks she has increased fatigue as well as SOB on exertion. She also notes she had migraine type HA more frequently. She had  Out pt labs on 20 which showed a hgb 7.1 with an Iron Sat 25% and a ferritin 743 and WBC 3.61. She received a dose VALENTIN recently. On 20 hgb 9.3 and on 20 Hgb 11.2. She denies any changes in stool form, consistency, frequency or color. Daneil Slice states over the last 2 days she was so fatigued she could not do her dialysis    Past Medical History:   Diagnosis Date    JOLLY (acute kidney injury) ([address] 2016    AVF (arteriovenous fistula) ([address] 2018    let arm    Chronic kidney disease     Dialysis AV fistula malfunction, initial encounter ([address] 2019    DM type 1 (diabetes mellitus, type 1) ([address]     Encounter regarding vascular access for dialysis for ESRD ([address] 2018    ESRD (end stage renal disease) ([address]     Hemodialysis patient ([facility]     amrita dawson  fri / graft in left arm    Hypertension     Tobacco abuse 2016       Past Surgical History:   Procedure Laterality Date     SECTION  2013    CYST INCISION AND DRAINAGE  2011    perirectal abscess. [facility].  Dr. Kristyn Valles Left 2019    LEFT ARM FISTULAGRAM, BALLOON ANGIOPLASTY performed by Alejandra Marmolejo MD at [address]      FRACTURE SURGERY      fracture right ulnar, left tibia, and pelvis    OTHER SURGICAL HISTORY   open reduction internal fixation left radial shaft    OTHER SURGICAL HISTORY  08/19/2016    pericardial window    OTHER SURGICAL HISTORY  08/23/2016     r tesio insertion  / remove 8/16/2018    OTHER SURGICAL HISTORY Left 02/17/2017    insertion a-v fistula left arm    VA ANASTOMOSIS,AV,ANY SITE Left 7/17/2018    REVISION AV FISTULA - LEFT ARM performed by Natasha Perera MD at [facility] NON-TUNNEL CV CATH N/A 5/10/2018    TESIO CATHETER INSERTION performed by Sundeep Gamboa MD at [address] [place] [address] [address] MEMBRANE Left 8/28/2018    PARS PLANA VITRECTOMY 25 GAUGE, VITREOUS HEMORRHAGE REMOVAL, ENDO LASER, AIR/FLUID  EXCHANGE LEFT EYE performed by Himanshu Sauceda MD at [facility] OR       Family History   Problem Relation Age of Onset    Stroke Mother     Cancer Father     Diabetes Paternal Aunt         reports that she has been smoking cigarettes. She has a 3.75 pack-year smoking history. She has never used smokeless tobacco. She reports current alcohol use. She reports that she does not use drugs. Allergies:  Patient has no known allergies. Current Medications:    0.9 % sodium chloride bolus, Once  lidocaine (LMX) 4 % cream, PRN        Review of Systems:   Pertinent items are noted in HPI. Remainder of a complete review of systems is (-) other than stated in the HPI    Physical exam:   Constitutional:  Young female in NAD  Vitals:   VITALS:  BP (!) 187/98   Pulse 95   Temp 98 °F (36.7 °C)   Resp 18   Ht 4' 11\" (1.499 m)   Wt 145 lb (65.8 kg)   SpO2 95%   BMI 29.29 kg/m²   24HR INTAKE/OUTPUT:    Intake/Output Summary (Last 24 hours) at 7/13/2020 1627  Last data filed at 7/13/2020 1520  Gross per 24 hour   Intake 700 ml   Output --   Net 700 ml     URINARY CATHETER OUTPUT (Rolle):     DRAIN/TUBE OUTPUT:     VENT SETTINGS:  Vent Information  SpO2: 95 %  Additional Respiratory  Assessments  Pulse: 95  Resp: 18  SpO2: 95 %    Skin: no rash, turgor wnl  Heent:  eomi, mmm  Neck: no bruits or jvd noted  Cardiovascular:  PMI not lat S1, S2 without S3 or rub  Respiratory: CTA B without w/r/r  Abdomen:  +bs, soft, nt, nd  Ext: (-) bilat lower extremity edema, LUE AVF good bruit and thrill  Psychiatric: mood and affect appropriate  Musculoskeletal:  Rom, muscular strength intact    Data:   Labs:  CBC:   Lab Results   Component Value Date    WBC 5.0 07/13/2020    RBC 2.06 07/13/2020    RBC  08/19/2016      RBC           [id number]    transfused   08/21/16  07:02  SCC    RBC  08/19/2016      RBC           [id number]    released     08/23/16  00:54  SCC    HGB 6.9 07/13/2020    HCT 20.9 07/13/2020    .5 07/13/2020    MCH 33.5 07/13/2020    MCHC 33.0 07/13/2020    RDW 15.8 07/13/2020     07/13/2020    MPV 9.9 07/13/2020     CBC with Differential:    Lab Results   Component Value Date    WBC 5.0 07/13/2020    RBC 2.06 07/13/2020    RBC  08/19/2016      RBC           [id number]    transfused   08/21/16  07:02  SCC    RBC  08/19/2016      RBC           [id number]    released     08/23/16  00:54  SCC    HGB 6.9 07/13/2020    HCT 20.9 07/13/2020     07/13/2020    .5 07/13/2020    MCH 33.5 07/13/2020    MCHC 33.0 07/13/2020    RDW 15.8 07/13/2020    SEGSPCT 53 10/08/2013    LYMPHOPCT 34.1 07/13/2020    MONOPCT 8.8 07/13/2020    BASOPCT 0.2 07/13/2020    MONOSABS 0.44 07/13/2020    LYMPHSABS 1.70 07/13/2020    EOSABS 0.17 07/13/2020    BASOSABS 0.01 07/13/2020     Hemoglobin/Hematocrit:    Lab Results   Component Value Date    HGB 6.9 07/13/2020    HCT 20.9 07/13/2020     CMP:    Lab Results   Component Value Date     07/13/2020    K 4.6 07/13/2020    CL 95 07/13/2020    CO2 30 07/13/2020    BUN 42 07/13/2020    CREATININE 12.9 07/13/2020    GFRAA 4 07/13/2020    LABGLOM 4 07/13/2020    GLUCOSE 303 07/13/2020    GLUCOSE 279 07/27/2011    PROT 7.0 11/07/2019    LABALBU 3.7 11/07/2019    LABALBU 3.9 01/10/2011 CALCIUM 9.1 07/13/2020    BILITOT 0.3 11/07/2019    ALKPHOS 60 11/07/2019    AST 12 11/07/2019    ALT 8 11/07/2019     BMP:    Lab Results   Component Value Date     07/13/2020    K 4.6 07/13/2020    CL 95 07/13/2020    CO2 30 07/13/2020    BUN 42 07/13/2020    LABALBU 3.7 11/07/2019    LABALBU 3.9 01/10/2011    CREATININE 12.9 07/13/2020    CALCIUM 9.1 07/13/2020    GFRAA 4 07/13/2020    LABGLOM 4 07/13/2020    GLUCOSE 303 07/13/2020    GLUCOSE 279 07/27/2011     Hepatic Function Panel:    Lab Results   Component Value Date    ALKPHOS 60 11/07/2019    ALT 8 11/07/2019    AST 12 11/07/2019    PROT 7.0 11/07/2019    BILITOT 0.3 11/07/2019    LABALBU 3.7 11/07/2019    LABALBU 3.9 01/10/2011     Albumin:    Lab Results   Component Value Date    LABALBU 3.7 11/07/2019    LABALBU 3.9 01/10/2011     Calcium:    Lab Results   Component Value Date    CALCIUM 9.1 07/13/2020     Ionized Calcium:    Lab Results   Component Value Date    IONCA 1.39 04/30/2013     Magnesium:    Lab Results   Component Value Date    MG 2.3 11/07/2019     Phosphorus:    Lab Results   Component Value Date    PHOS 6.0 11/07/2019     LDH:  No results found for: LDH  Uric Acid:  No results found for: LABURIC, URICACID  PT/INR:    Lab Results   Component Value Date    PROTIME 12.2 11/26/2018    INR 1.1 11/26/2018     PTT:    Lab Results   Component Value Date    APTT 29.8 11/26/2018   [APTT}  Troponin:    Lab Results   Component Value Date    TROPONINI 0.04 07/13/2020     U/A:    Lab Results   Component Value Date    COLORU Yellow 11/23/2017    PROTEINU >=300 11/23/2017    PHUR 8.5 11/23/2017    LABCAST FEW  09/29/2013    LABCAST FEW  09/29/2013    WBCUA 2-5 11/23/2017    WBCUA NONE 11/21/2010    RBCUA 5-10 11/23/2017    RBCUA 0-1 09/29/2013    TRICHOMONAS RARE 08/16/2016    BACTERIA RARE 11/23/2017    CLARITYU Clear 11/23/2017    SPECGRAV 1.015 11/23/2017    LEUKOCYTESUR Negative 11/23/2017    UROBILINOGEN 0.2 11/23/2017    BILIRUBINUR Negative 2017    BILIRUBINUR NEGATIVE 2010    BLOODU MODERATE 2017    GLUCOSEU 500 2017    GLUCOSEU >=1000 2010     ABG:    Lab Results   Component Value Date    PH 7.467 2018    PCO2 45.1 2018    PO2 63.1 2018    HCO3 31.9 2018    BE 7.3 2018    O2SAT 91.8 2018     HgBA1c:    Lab Results   Component Value Date    LABA1C 9.3 2019     Microalbumen/Creatinine ratio:  No components found for: RUCREAT  FLP:    Lab Results   Component Value Date    TRIG 68 10/08/2013    HDL 50.0 10/08/2013    LDLCALC 91 10/08/2013     TSH:    Lab Results   Component Value Date    TSH 2.200 2016     VITAMIN B12: No components found for: B12  FOLATE:  No results found for: FOLATE  Iron Saturation:  No components found for: PERCENTFE  FERRITIN:    Lab Results   Component Value Date    FERRITIN 92 2016     AMYLASE:    Lab Results   Component Value Date    AMYLASE 83 2018     LIPASE:    Lab Results   Component Value Date    LIPASE 12 2018     Urine Toxicology:  No components found for: IAMMENTA, IBARBIT, IBENZO, ICOCAINE, IMARTHC, IOPIATES, IPHENCYC  24 Hour Urine for Protein:  No components found for: RAWUPRO, UHRS3, NTEV36AY, UTV3  24 Hour Urine for Creatinine Clearance:  No components found for: Kathaleen Gauss, UTV10     Imaging:  CXR results:  Patient MRN: [id number]    : 1985    Age:  35 years    Gender: Female    Order Date: 2020 11:30 AM    Exam: XR CHEST (2 VW)    Number of Images: 2 views    Indication:   Shortness of breath    Shortness of breath    Comparison: None.         Findings:         The heart appears prominent         The pulmonary vasculature appears to be crowded    The aorta appears to be tortuous    There is a poor inspiratory effort              Impression    Limited study, due to a poor inspiratory effort, no gross    abnormality          Assessment  1-ESRD requiring RRT via Home IHD on 4 days per week with 3hr treatments using the LUE AVF  PLAN:1. IHD this PM    2-Anemia in CKD-HgB has declined from 11.2 on 6/2/20 to 7.1 on 7/6/20 and now with an associated decrease in the WBC from 5.71-->3.6 and -->158  PLAN:1. Receiving 1 unit PRBC  This PM for HgB 6.9  2. Check FOB, B12, folate, SPEP and UPEP  3. If no evidence of GI loss consider Heme/Onc to see  4. Continue VALENTIN    3-Sec HPTH of Renal Origin with hyperphosphatemia-Ca++ WNL-outpt PO4 6.1 on 7/6  PLAN:1. Continue PO4 binder  2. Check PO4    4- HTN with CKD 5/ESRD-above goal <140/90  PLAN: 1.  Vol removal on IHD and resume home meds and follow    5- Dm1 uncontrolled as evidenced by the most recent outpt HgB A1c 8.5% and the  in ED    Thank you for allowing us to participate in care of Katiedominguez Perera  4:24 PM  7/13/2020

## 2020-07-13 NOTE — ED PROVIDER NOTES
Patient on home dialysis 4 times per week presents for anemia. Patient reports significant, fatigue, constant, worsening, nothing makes it better or worse. She received iron infusion Friday without relief. She reports she had planned to do her dialysis Saturday and Sunday but did not do it either day due to extreme fatigue. She reports she was told her hemoglobin was low but is not sure the value, she does not know her baseline hemoglobin. The history is provided by the patient. Fatigue   Severity:  Moderate  Onset quality:  Gradual  Duration:  4 days  Timing:  Constant  Progression:  Worsening  Chronicity:  New  Relieved by:  Lying down  Ineffective treatments:  Drinking fluids  Associated symptoms: chest pain and shortness of breath    Associated symptoms: no abdominal pain, no cough, no dysuria, no fever and no headaches    Risk factors: anemia         Review of Systems   Constitutional: Positive for fatigue. Negative for chills and fever. HENT: Negative for congestion. Eyes: Positive for visual disturbance (One week nightime difficulty seeing - resolved). Respiratory: Positive for shortness of breath. Negative for cough. Cardiovascular: Positive for chest pain. Gastrointestinal: Negative for abdominal pain. Endocrine: Positive for polydipsia. Genitourinary: Negative for difficulty urinating and dysuria. Skin: Negative for rash. Allergic/Immunologic: Negative for immunocompromised state. Neurological: Negative for headaches. Physical Exam  Constitutional:       Appearance: Normal appearance. She is obese. HENT:      Head: Normocephalic and atraumatic. Right Ear: External ear normal.      Left Ear: External ear normal.      Nose: Nose normal.      Mouth/Throat:      Mouth: Mucous membranes are dry. Eyes:      Pupils: Pupils are equal, round, and reactive to light. Neck:      Musculoskeletal: Normal range of motion and neck supple.    Cardiovascular:      Rate and Rhythm: Normal rate and regular rhythm. Pulses: Normal pulses. Heart sounds: Normal heart sounds. No murmur. No gallop. Pulmonary:      Effort: Pulmonary effort is normal. No respiratory distress. Breath sounds: Normal breath sounds. No wheezing. Abdominal:      General: Abdomen is flat. Bowel sounds are normal.      Palpations: Abdomen is soft. Musculoskeletal: Normal range of motion. Skin:     General: Skin is warm and dry. Neurological:      General: No focal deficit present. Mental Status: She is alert. Procedures   EKG: This EKG is signed by emergency department physician. Rate: 98  Rhythm: Sinus  Interpretation: no acute changes  Comparison: stable as compared to patient's most recent EKG     MDM   Patient states she has missed 2 rounds of dialysis, EKG and troponin and chest xray will be checked as well as a BMP for possible hyperkalemia CBC and a type and screen. Patient's hemoglobin was 6.9, will get 1 unit of blood. Patient was brought to dialysis. Spoke to Dr. Tj Castillo, who agreed that admission would be a good idea to search for a source of her symptomatic anemia due to concern that her hemoglobin was 11 just over 1 month ago so this is an acute change. Patient will be admitted to tele with a hemonc consult due to rapidly lowering hemoglobin. Patient is unable to stay in the hospital due to familial caretaker issues of her daughter. We advised her not to leave [address], however she is of sound mind and decision-making capabilities. She was given follow-up instructions with Dr. Og Arroyo in hematology and also was given return precautions as well as discussion of her abnormal labs and vital signs today.   ED Course as of Jul 13 2328 Mon Jul 13, 2020   1208   ATTENDING PROVIDER ATTESTATION:     I have personally performed and/or participated in the history, exam, medical decision making, and procedures and agree with all pertinent clinical information unless otherwise noted. I have also reviewed and agree with the past medical, family and social history unless otherwise noted. I have discussed this patient in detail with the resident and provided the instruction and education regarding the evidence-based evaluation and treatment of [unfilled]  History: 48-year-old female    My findings: Rock Gottron is a 38 y.o. female whom is in no distress. Physical exam reveals palpable thrill to LUE bruit. Normal heart and lung sounds    My plan: Symptomatic and supportive care. Transfuse 1 unit. Electronically signed by Eliazar Arrdeondo DO on 7/13/20 at 12:08 PM EDT          [MF]   1357 Creatinine(!!): 12.9 [JG]   1358 Hemoglobin Quant(!!): 6.9 [JG]   1411 Hemoccult test performed, was negative    [JG]   1842 This patient has chosen to leave against medical advice. I have personally explained to them that choosing to do so may result in permanent bodily harm or death. I discussed at length that without further evaluation and monitoring there may be unforeseen circumstances and deterioration resulting in permanent bodily harm or death as a result of their choice. They are alert, oriented, and competent at this time. They state that they are aware of the serious risks as explained, but they continue to wish to leave against medical advice because she does not have anyone to take care of her daughter with CP. She states that if she can find childcare she will return. In light of their decision to leave against medical advice, follow-up has been arranged and they are aware of the importance of following up as instructed. They have been advised that they should return to the ED immediately if they change their mind at any time, or if their condition begins to change or worsen. [MF]      ED Course User Index  [JG] Servando Bennett MD  [MF] Eliazar Arredondo DO      This patient has chosen to leave against medical advice.   I have personally explained to them that choosing to do so may result in permanent bodily harm or death. I discussed at length that without further evaluation and monitoring there may be unforeseen circumstances and deterioration resulting in permanent bodily harm or death as a result of their choice. They are alert, oriented, and competent at this time. They state that they are aware of the serious risks as explained, but they continue to wish to leave against medical   advice. In light of their decision to leave against medical advice, follow-up has been arranged and they are aware of the importance of following up as instructed. They have been advised that they should return to the ED immediately if they change their mind at any time, or if their condition begins to change or worsen. ED Course as of Jul 13 2328   Mon Jul 13, 2020   1208   ATTENDING PROVIDER ATTESTATION:     I have personally performed and/or participated in the history, exam, medical decision making, and procedures and agree with all pertinent clinical information unless otherwise noted. I have also reviewed and agree with the past medical, family and social history unless otherwise noted. I have discussed this patient in detail with the resident and provided the instruction and education regarding the evidence-based evaluation and treatment of [unfilled]  History: 42-year-old female    My findings: Meryle Aspen is a 38 y.o. female whom is in no distress. Physical exam reveals palpable thrill to LUE bruit. Normal heart and lung sounds    My plan: Symptomatic and supportive care. Transfuse 1 unit. Electronically signed by Arvind Baca DO on 7/13/20 at 12:08 PM EDT          [MF]   1357 Creatinine(!!): 12.9 [JG]   1358 Hemoglobin Quant(!!): 6.9 [JG]   1411 Hemoccult test performed, was negative    [JG]   1842 This patient has chosen to leave against medical advice.   I have personally explained to them that choosing to do so may result in permanent bodily harm or death. I discussed at length that without further evaluation and monitoring there may be unforeseen circumstances and deterioration resulting in permanent bodily harm or death as a result of their choice. They are alert, oriented, and competent at this time. They state that they are aware of the serious risks as explained, but they continue to wish to leave against medical advice because she does not have anyone to take care of her daughter with CP. She states that if she can find childcare she will return. In light of their decision to leave against medical advice, follow-up has been arranged and they are aware of the importance of following up as instructed. They have been advised that they should return to the ED immediately if they change their mind at any time, or if their condition begins to change or worsen. []      ED Course User Index  [JG] Drake Rainey MD  [] Erica Maddox, DO       --------------------------------------------- PAST HISTORY ---------------------------------------------  Past Medical History:  has a past medical history of JOLLY (acute kidney injury) ([facility] [address] AVF (arteriovenous fistula) ([facility] [address] [facility] Chronic kidney disease, Dialysis AV fistula malfunction, initial encounter ([facility] [address] DM type 1 (diabetes mellitus, type 1) ([facility] [address] Encounter regarding vascular access for dialysis for ESRD ([facility] [address] ESRD (end stage renal disease) ([facility] [address] Hemodialysis patient ([facility] [address] [facility] Hypertension, and Tobacco abuse. Past Surgical History:  has a past surgical history that includes Dilation and curettage of uterus (); cyst incision and drainage (2011); other surgical history (/); fracture surgery (); other surgical history (2016); other surgical history (2016); other surgical history (Left, 2017);   section (); pr insert non-tunnel cv cath (N/A, 5/10/2018); pr anastomosis,av,any site (Left, 7/17/2018); pr vitrectomy pars plana remove preretinal membrane (Left, 8/28/2018); and Dialysis fistula creation (Left, 11/7/2019). Social History:  reports that she has been smoking cigarettes. She has a 3.75 pack-year smoking history. She has never used smokeless tobacco. She reports current alcohol use. She reports that she does not use drugs. Family History: family history includes Cancer in her father; Diabetes in her paternal aunt; Stroke in her mother. The patients home medications have been reviewed. Allergies: Patient has no known allergies.     -------------------------------------------------- RESULTS -------------------------------------------------  Labs:  Results for orders placed or performed during the hospital encounter of 07/13/20   CBC Auto Differential   Result Value Ref Range    WBC 5.0 4.5 - 11.5 E9/L    RBC 2.06 (L) 3.50 - 5.50 E12/L    Hemoglobin 6.9 (LL) 11.5 - 15.5 g/dL    Hematocrit 20.9 (L) 34.0 - 48.0 %    .5 (H) 80.0 - 99.9 fL    MCH 33.5 26.0 - 35.0 pg    MCHC 33.0 32.0 - 34.5 %    RDW 15.8 (H) 11.5 - 15.0 fL    Platelets 996 648 - 381 E9/L    MPV 9.9 7.0 - 12.0 fL    Neutrophils % 53.3 43.0 - 80.0 %    Immature Granulocytes % 0.2 0.0 - 5.0 %    Lymphocytes % 34.1 20.0 - 42.0 %    Monocytes % 8.8 2.0 - 12.0 %    Eosinophils % 3.4 0.0 - 6.0 %    Basophils % 0.2 0.0 - 2.0 %    Neutrophils Absolute 2.65 1.80 - 7.30 E9/L    Immature Granulocytes # 0.01 E9/L    Lymphocytes Absolute 1.70 1.50 - 4.00 E9/L    Monocytes Absolute 0.44 0.10 - 0.95 E9/L    Eosinophils Absolute 0.17 0.05 - 0.50 E9/L    Basophils Absolute 0.01 0.00 - 0.20 C3/J   Basic Metabolic Panel w/ Reflex to MG   Result Value Ref Range    Sodium 138 132 - 146 mmol/L    Potassium reflex Magnesium 4.6 3.5 - 5.0 mmol/L    Chloride 95 (L) 98 - 107 mmol/L    CO2 30 (H) 22 - 29 mmol/L    Anion Gap 13 7 - 16 mmol/L    Glucose 303 (H) 74 - 99 mg/dL    BUN 42 (H) 6 - 20 mg/dL    CREATININE 12.9 (HH) 0.5 - 1.0 mg/dL    GFR Non-African American 4 >=60 mL/min/1.73    GFR African American 4     Calcium 9.1 8.6 - 10.2 mg/dL   Troponin   Result Value Ref Range    Troponin 0.04 (H) 0.00 - 0.03 ng/mL   EKG 12 Lead   Result Value Ref Range    Ventricular Rate 98 BPM    Atrial Rate 98 BPM    P-R Interval 126 ms    QRS Duration 72 ms    Q-T Interval 342 ms    QTc Calculation (Bazett) 436 ms    P Axis 53 degrees    R Axis 9 degrees    T Axis 59 degrees   TYPE AND SCREEN   Result Value Ref Range    ABO/Rh O POS     Antibody Screen NEG    PREPARE RBC (CROSSMATCH), 1 Units   Result Value Ref Range    Product Code Blood Bank [id number]     Description Blood Bank Red Blood Cells, Leuko-reduced     Unit Number [id number]     Dispense Status Blood Bank issued        Radiology:  Xr Chest Standard (2 Vw)    Result Date: 2020  Patient MRN: [id number] : 1985 Age:  38 years Gender: Female Order Date: 2020 11:30 AM Exam: XR CHEST (2 VW) Number of Images: 2 views Indication:   Shortness of breath Shortness of breath Comparison: None. Findings: The heart appears prominent The pulmonary vasculature appears to be crowded The aorta appears to be tortuous There is a poor inspiratory effort     Limited study, due to a poor inspiratory effort, no gross abnormality       ------------------------- NURSING NOTES AND VITALS REVIEWED ---------------------------  Date / Time Roomed:  2020 11:06 AM  ED Bed Assignment:  [facility]    The nursing notes within the ED encounter and vital signs as below have been reviewed. BP (!) 176/98   Pulse 98   Temp 97.4 °F (36.3 °C)   Resp 18   Ht 4' 11\" (1.499 m)   Wt 145 lb (65.8 kg)   SpO2 95%   BMI 29.29 kg/m²   Oxygen Saturation Interpretation: Normal      ------------------------------------------ PROGRESS NOTES ------------------------------------------    Spoke with Dr. Miguel Irvin (Medicine). Discussed case. They will admit this patient.     I have spoken with the patient and discussed todays availabe results to this point, in addition to providing specific details for the plan of care and counseling regarding the diagnosis and prognosis. Their questions are answered, however they are not agreeable to admission.     --------------------------------- ADDITIONAL PROVIDER NOTES ---------------------------------  This patient has chosen to leave against medical advice. I have personally explained to them that choosing to do so may result in permanent bodily harm or death. I discussed at length that without further evaluation and monitoring there may be unforeseen circumstances and deterioration resulting in permanent bodily harm or death as a result of their choice. They are alert, oriented, and competent at this time. They state that they are aware of the serious risks as explained, but they continue to wish to leave against medical   advice. In light of their decision to leave against medical advice, follow-up has been arranged and they are aware of the importance of following up as instructed. They have been advised that they should return to the ED immediately if they change their mind at any time, or if their condition begins to change or worsen. The follow up plan has been discussed in detail and they are aware of the specific conditions for emergent return, as well as the importance of follow-up. Discharge Medication List as of 7/13/2020  6:42 PM          Diagnosis:  1. Symptomatic anemia    2. ESRD (end stage renal disease) on dialysis [facility])        Disposition:  Patient's disposition: Left against medical advice. Patient's condition is stable.        Maninder Suarez MD  Resident  07/13/20 [facility]  Resident  07/13/20 [address] [place] [address],   07/13/20 [id number]

## 2020-07-13 NOTE — FLOWSHEET NOTE
07/13/20 1832   Vital Signs   BP (!) 176/98   Temp 97.4 °F (36.3 °C)   Pulse 98   Resp 18   Post-Hemodialysis Assessment   Post-Treatment Procedures Blood returned; Access bleeding time < 10 minutes   Machine Disinfection Process Acid/Vinegar Clean;Bleach; Machine Absence of [employer]; Exterior Machine Disinfection   Rinseback Volume (ml) 300 ml   Total Liters Processed (l/min) 63.7 l/min   Dialyzer Clearance Lightly streaked   Duration of Treatment (minutes) 180 minutes   Hemodialysis Intake (ml) 300 ml   Hemodialysis Output (ml) 1200 ml   NET Removed (ml) 900 ml   Tolerated Treatment Good   Patient Response to Treatment tolerated tx well, 900ml removed

## 2020-07-13 NOTE — ED NOTES
Pt refusing admission. Pt spoke to admitting NP to notify that she could not stay due to lack of . Pt was returned to ED. Pt spoken to by Dr. Danielle Ventura to explain AMA. Pt agrees and accepts to sign AMA form.       Palomo Leos RN  07/13/20 [id number]

## 2020-07-13 NOTE — ED NOTES
Reviewed discharge instructions with patient, discussed medications and addressed all patient questions/concerns. Pt verbalizes understanding.        Luis Gomez RN  07/13/20 [id number]

## 2020-07-13 NOTE — ED NOTES
Informed consent for blood transfusion obtained from patient and placed in chart.      Odette Dean RN  07/13/20 8008

## 2020-07-14 ENCOUNTER — HOSPITAL ENCOUNTER (INPATIENT)
Age: 35
LOS: 1 days | Discharge: HOME | DRG: 811 | End: 2020-07-14
Attending: EMERGENCY MEDICINE | Admitting: INTERNAL MEDICINE
Payer: MEDICARE

## 2020-07-14 VITALS
HEART RATE: 93 BPM | OXYGEN SATURATION: 94 % | HEIGHT: 59 IN | WEIGHT: 135 LBS | RESPIRATION RATE: 16 BRPM | DIASTOLIC BLOOD PRESSURE: 73 MMHG | SYSTOLIC BLOOD PRESSURE: 143 MMHG | TEMPERATURE: 98.7 F | BODY MASS INDEX: 27.21 KG/M2

## 2020-07-14 PROBLEM — D64.9 ANEMIA: Status: ACTIVE | Noted: 2020-07-14

## 2020-07-14 LAB
ANION GAP SERPL CALCULATED.3IONS-SCNC: 11 MMOL/L (ref 7–16)
BASOPHILS ABSOLUTE: 0.02 E9/L (ref 0–0.2)
BASOPHILS RELATIVE PERCENT: 0.3 % (ref 0–2)
BUN BLDV-MCNC: 22 MG/DL (ref 6–20)
CALCIUM SERPL-MCNC: 9.2 MG/DL (ref 8.6–10.2)
CHLORIDE BLD-SCNC: 94 MMOL/L (ref 98–107)
CHP ED QC CHECK: NORMAL
CO2: 29 MMOL/L (ref 22–29)
CREAT SERPL-MCNC: 7.7 MG/DL (ref 0.5–1)
EOSINOPHILS ABSOLUTE: 0.16 E9/L (ref 0.05–0.5)
EOSINOPHILS RELATIVE PERCENT: 2.6 % (ref 0–6)
FERRITIN: 841 NG/ML
FOLATE: 18.4 NG/ML (ref 4.8–24.2)
GFR AFRICAN AMERICAN: 7
GFR NON-AFRICAN AMERICAN: 7 ML/MIN/1.73
GLUCOSE BLD-MCNC: 442 MG/DL (ref 74–99)
HAPTOGLOBIN: 120 MG/DL (ref 30–200)
HBA1C MFR BLD: 8.3 % (ref 4–5.6)
HBV SURFACE AB TITR SER: NORMAL {TITER}
HCT VFR BLD CALC: 24.4 % (ref 34–48)
HCT VFR BLD CALC: 24.5 % (ref 34–48)
HCT VFR BLD CALC: 25.2 % (ref 34–48)
HEMOCCULT STL QL: NEGATIVE
HEMOGLOBIN: 8.3 G/DL (ref 11.5–15.5)
HEMOGLOBIN: 8.5 G/DL (ref 11.5–15.5)
HEPATITIS B SURFACE ANTIGEN INTERPRETATION: NORMAL
HEPATITIS C ANTIBODY INTERPRETATION: NORMAL
IMMATURE GRANULOCYTES #: 0.02 E9/L
IMMATURE GRANULOCYTES %: 0.3 % (ref 0–5)
IMMATURE RETIC FRACT: 26.4 % (ref 3–15.9)
LACTATE DEHYDROGENASE: 366 U/L (ref 135–214)
LYMPHOCYTES ABSOLUTE: 1.86 E9/L (ref 1.5–4)
LYMPHOCYTES RELATIVE PERCENT: 29.7 % (ref 20–42)
MCH RBC QN AUTO: 33.3 PG (ref 26–35)
MCHC RBC AUTO-ENTMCNC: 33.9 % (ref 32–34.5)
MCV RBC AUTO: 98.4 FL (ref 80–99.9)
METER GLUCOSE: 116 MG/DL (ref 74–99)
METER GLUCOSE: 121 MG/DL (ref 74–99)
METER GLUCOSE: 171 MG/DL (ref 74–99)
MONOCYTES ABSOLUTE: 0.45 E9/L (ref 0.1–0.95)
MONOCYTES RELATIVE PERCENT: 7.2 % (ref 2–12)
NEUTROPHILS ABSOLUTE: 3.76 E9/L (ref 1.8–7.3)
NEUTROPHILS RELATIVE PERCENT: 59.9 % (ref 43–80)
PARATHYROID HORMONE INTACT: 156 PG/ML (ref 15–65)
PDW BLD-RTO: 16.5 FL (ref 11.5–15)
PLATELET # BLD: 141 E9/L (ref 130–450)
PMV BLD AUTO: 9.8 FL (ref 7–12)
POTASSIUM REFLEX MAGNESIUM: 4.9 MMOL/L (ref 3.5–5)
RBC # BLD: 2.49 E12/L (ref 3.5–5.5)
RETIC HGB EQUIVALENT: 34.9 PG (ref 28.2–36.6)
RETICULOCYTE ABSOLUTE COUNT: 0.1 E12/L
RETICULOCYTE COUNT PCT: 4.1 % (ref 0.4–1.9)
SODIUM BLD-SCNC: 134 MMOL/L (ref 132–146)
VITAMIN B-12: 589 PG/ML (ref 211–946)
WBC # BLD: 6.3 E9/L (ref 4.5–11.5)

## 2020-07-14 PROCEDURE — 85025 COMPLETE CBC W/AUTO DIFF WBC: CPT

## 2020-07-14 PROCEDURE — 82728 ASSAY OF FERRITIN: CPT

## 2020-07-14 PROCEDURE — 85045 AUTOMATED RETICULOCYTE COUNT: CPT

## 2020-07-14 PROCEDURE — 99235 HOSP IP/OBS SAME DATE MOD 70: CPT | Performed by: INTERNAL MEDICINE

## 2020-07-14 PROCEDURE — 85014 HEMATOCRIT: CPT

## 2020-07-14 PROCEDURE — 84165 PROTEIN E-PHORESIS SERUM: CPT

## 2020-07-14 PROCEDURE — 83615 LACTATE (LD) (LDH) ENZYME: CPT

## 2020-07-14 PROCEDURE — 36415 COLL VENOUS BLD VENIPUNCTURE: CPT

## 2020-07-14 PROCEDURE — 99285 EMERGENCY DEPT VISIT HI MDM: CPT

## 2020-07-14 PROCEDURE — 99223 1ST HOSP IP/OBS HIGH 75: CPT | Performed by: INTERNAL MEDICINE

## 2020-07-14 PROCEDURE — 83010 ASSAY OF HAPTOGLOBIN QUANT: CPT

## 2020-07-14 PROCEDURE — 6370000000 HC RX 637 (ALT 250 FOR IP): Performed by: INTERNAL MEDICINE

## 2020-07-14 PROCEDURE — 2580000003 HC RX 258: Performed by: INTERNAL MEDICINE

## 2020-07-14 PROCEDURE — 83970 ASSAY OF PARATHORMONE: CPT

## 2020-07-14 PROCEDURE — 85018 HEMOGLOBIN: CPT

## 2020-07-14 PROCEDURE — 80048 BASIC METABOLIC PNL TOTAL CA: CPT

## 2020-07-14 PROCEDURE — 82962 GLUCOSE BLOOD TEST: CPT

## 2020-07-14 PROCEDURE — 83036 HEMOGLOBIN GLYCOSYLATED A1C: CPT

## 2020-07-14 PROCEDURE — 82746 ASSAY OF FOLIC ACID SERUM: CPT

## 2020-07-14 PROCEDURE — 82607 VITAMIN B-12: CPT

## 2020-07-14 PROCEDURE — 2060000000 HC ICU INTERMEDIATE R&B

## 2020-07-14 RX ORDER — SODIUM CHLORIDE 0.9 % (FLUSH) 0.9 %
10 SYRINGE (ML) INJECTION EVERY 12 HOURS SCHEDULED
Status: DISCONTINUED | OUTPATIENT
Start: 2020-07-14 | End: 2020-07-14 | Stop reason: HOSPADM

## 2020-07-14 RX ORDER — FAMOTIDINE 20 MG/1
20 TABLET, FILM COATED ORAL EVERY MORNING
Status: DISCONTINUED | OUTPATIENT
Start: 2020-07-14 | End: 2020-07-14 | Stop reason: HOSPADM

## 2020-07-14 RX ORDER — ONDANSETRON 2 MG/ML
4 INJECTION INTRAMUSCULAR; INTRAVENOUS EVERY 6 HOURS PRN
Status: DISCONTINUED | OUTPATIENT
Start: 2020-07-14 | End: 2020-07-14 | Stop reason: HOSPADM

## 2020-07-14 RX ORDER — CHOLECALCIFEROL (VITAMIN D3) 50 MCG
2000 TABLET ORAL EVERY MORNING
Status: DISCONTINUED | OUTPATIENT
Start: 2020-07-14 | End: 2020-07-14 | Stop reason: HOSPADM

## 2020-07-14 RX ORDER — CARVEDILOL 25 MG/1
25 TABLET ORAL 2 TIMES DAILY
Status: DISCONTINUED | OUTPATIENT
Start: 2020-07-14 | End: 2020-07-14 | Stop reason: HOSPADM

## 2020-07-14 RX ORDER — DEXTROSE MONOHYDRATE 50 MG/ML
100 INJECTION, SOLUTION INTRAVENOUS PRN
Status: DISCONTINUED | OUTPATIENT
Start: 2020-07-14 | End: 2020-07-14 | Stop reason: HOSPADM

## 2020-07-14 RX ORDER — ACETAMINOPHEN 325 MG/1
650 TABLET ORAL EVERY 6 HOURS PRN
Status: DISCONTINUED | OUTPATIENT
Start: 2020-07-14 | End: 2020-07-14 | Stop reason: HOSPADM

## 2020-07-14 RX ORDER — ACETAMINOPHEN 650 MG/1
650 SUPPOSITORY RECTAL EVERY 6 HOURS PRN
Status: DISCONTINUED | OUTPATIENT
Start: 2020-07-14 | End: 2020-07-14 | Stop reason: HOSPADM

## 2020-07-14 RX ORDER — PROMETHAZINE HYDROCHLORIDE 25 MG/1
12.5 TABLET ORAL EVERY 6 HOURS PRN
Status: DISCONTINUED | OUTPATIENT
Start: 2020-07-14 | End: 2020-07-14 | Stop reason: HOSPADM

## 2020-07-14 RX ORDER — NICOTINE POLACRILEX 4 MG
15 LOZENGE BUCCAL PRN
Status: DISCONTINUED | OUTPATIENT
Start: 2020-07-14 | End: 2020-07-14 | Stop reason: HOSPADM

## 2020-07-14 RX ORDER — POLYETHYLENE GLYCOL 3350 17 G/17G
17 POWDER, FOR SOLUTION ORAL DAILY PRN
Status: DISCONTINUED | OUTPATIENT
Start: 2020-07-14 | End: 2020-07-14 | Stop reason: HOSPADM

## 2020-07-14 RX ORDER — SODIUM CHLORIDE 0.9 % (FLUSH) 0.9 %
10 SYRINGE (ML) INJECTION PRN
Status: DISCONTINUED | OUTPATIENT
Start: 2020-07-14 | End: 2020-07-14 | Stop reason: HOSPADM

## 2020-07-14 RX ORDER — AMLODIPINE BESYLATE 10 MG/1
10 TABLET ORAL DAILY
Status: DISCONTINUED | OUTPATIENT
Start: 2020-07-14 | End: 2020-07-14 | Stop reason: HOSPADM

## 2020-07-14 RX ORDER — DEXTROSE MONOHYDRATE 25 G/50ML
12.5 INJECTION, SOLUTION INTRAVENOUS PRN
Status: DISCONTINUED | OUTPATIENT
Start: 2020-07-14 | End: 2020-07-14 | Stop reason: HOSPADM

## 2020-07-14 RX ORDER — INSULIN GLARGINE 100 [IU]/ML
16 INJECTION, SOLUTION SUBCUTANEOUS NIGHTLY
Status: DISCONTINUED | OUTPATIENT
Start: 2020-07-14 | End: 2020-07-14 | Stop reason: HOSPADM

## 2020-07-14 RX ORDER — BENZONATATE 100 MG/1
200 CAPSULE ORAL 3 TIMES DAILY PRN
Status: DISCONTINUED | OUTPATIENT
Start: 2020-07-14 | End: 2020-07-14 | Stop reason: HOSPADM

## 2020-07-14 RX ADMIN — CALCIUM ACETATE 1334 MG: 667 CAPSULE ORAL at 16:55

## 2020-07-14 RX ADMIN — CARVEDILOL 25 MG: 25 TABLET, FILM COATED ORAL at 08:55

## 2020-07-14 RX ADMIN — INSULIN LISPRO 3 UNITS: 100 INJECTION, SOLUTION INTRAVENOUS; SUBCUTANEOUS at 08:58

## 2020-07-14 RX ADMIN — INSULIN LISPRO 3 UNITS: 100 INJECTION, SOLUTION INTRAVENOUS; SUBCUTANEOUS at 16:57

## 2020-07-14 RX ADMIN — CHOLECALCIFEROL TAB 50 MCG (2000 UNIT) 2000 UNITS: 50 TAB at 08:55

## 2020-07-14 RX ADMIN — FAMOTIDINE 20 MG: 20 TABLET, FILM COATED ORAL at 08:55

## 2020-07-14 RX ADMIN — INSULIN LISPRO 2 UNITS: 100 INJECTION, SOLUTION INTRAVENOUS; SUBCUTANEOUS at 16:56

## 2020-07-14 RX ADMIN — CALCIUM ACETATE 1334 MG: 667 CAPSULE ORAL at 08:55

## 2020-07-14 RX ADMIN — AMLODIPINE BESYLATE 10 MG: 10 TABLET ORAL at 08:55

## 2020-07-14 RX ADMIN — SODIUM CHLORIDE, PRESERVATIVE FREE 10 ML: 5 INJECTION INTRAVENOUS at 08:58

## 2020-07-14 NOTE — ED PROVIDER NOTES
reports that she has been smoking cigarettes. She has smoked for the past 15.00 years. She has never used smokeless tobacco. She reports previous alcohol use. She reports that she does not use drugs. Family History: family history includes Cancer in her father; Diabetes in her paternal aunt; Stroke in her mother. The patients home medications have been reviewed. Allergies: Patient has no known allergies. ---------------------------------------------------PHYSICAL EXAM--------------------------------------    Constitutional/General: Alert and oriented x3, well appearing, non toxic in NAD  Head: Normocephalic and atraumatic  Eyes: PERRL, EOMI, conjunctive normal, sclera non icteric  Mouth: Oropharynx clear, handling secretions, no trismus, no asymmetry of the posterior oropharynx or uvular edema  Neck: Supple, full ROM, non tender to palpation in the midline, no stridor, no crepitus, no meningeal signs  Respiratory: Lungs clear to auscultation bilaterally, no wheezes, rales, or rhonchi. Not in respiratory distress  Cardiovascular:  Regular rate. Regular rhythm. No murmurs, gallops, or rubs. 2+ distal pulses  GI:  Abdomen Soft, Non tender, Non distended. +BS. No organomegaly, no palpable masses,  No rebound, guarding, or rigidity. Musculoskeletal: Moves all extremities x 4. Warm and well perfused, no clubbing, cyanosis, or edema. Capillary refill <3 seconds  Integument: skin warm and dry. No rashes. Neurologic: GCS 15, no focal deficits, symmetric strength 5/5 in the upper and lower extremities bilaterally  Psychiatric: Normal Affect    -------------------------------------------------- RESULTS -------------------------------------------------  I have personally reviewed all laboratory and imaging results for this patient. Results are listed below.      LABS:  Results for orders placed or performed during the hospital encounter of 07/14/20   CBC Auto Differential   Result Value Ref Range    WBC 6.3 4.5 - 11.5 E9/L    RBC 2.49 (L) 3.50 - 5.50 E12/L    Hemoglobin 8.3 (L) 11.5 - 15.5 g/dL    Hematocrit 24.5 (L) 34.0 - 48.0 %    MCV 98.4 80.0 - 99.9 fL    MCH 33.3 26.0 - 35.0 pg    MCHC 33.9 32.0 - 34.5 %    RDW 16.5 (H) 11.5 - 15.0 fL    Platelets 696 905 - 364 E9/L    MPV 9.8 7.0 - 12.0 fL    Neutrophils % 59.9 43.0 - 80.0 %    Immature Granulocytes % 0.3 0.0 - 5.0 %    Lymphocytes % 29.7 20.0 - 42.0 %    Monocytes % 7.2 2.0 - 12.0 %    Eosinophils % 2.6 0.0 - 6.0 %    Basophils % 0.3 0.0 - 2.0 %    Neutrophils Absolute 3.76 1.80 - 7.30 E9/L    Immature Granulocytes # 0.02 E9/L    Lymphocytes Absolute 1.86 1.50 - 4.00 E9/L    Monocytes Absolute 0.45 0.10 - 0.95 E9/L    Eosinophils Absolute 0.16 0.05 - 0.50 E9/L    Basophils Absolute 0.02 0.00 - 0.20 M3/M   Basic Metabolic Panel w/ Reflex to MG   Result Value Ref Range    Sodium 134 132 - 146 mmol/L    Potassium reflex Magnesium 4.9 3.5 - 5.0 mmol/L    Chloride 94 (L) 98 - 107 mmol/L    CO2 29 22 - 29 mmol/L    Anion Gap 11 7 - 16 mmol/L    Glucose 442 (H) 74 - 99 mg/dL    BUN 22 (H) 6 - 20 mg/dL    CREATININE 7.7 (H) 0.5 - 1.0 mg/dL    GFR Non-African American 7 >=60 mL/min/1.73    GFR African American 7     Calcium 9.2 8.6 - 10.2 mg/dL   Hemoglobin A1c   Result Value Ref Range    Hemoglobin A1C 8.3 (H) 4.0 - 5.6 %   Hemoglobin and hematocrit, blood   Result Value Ref Range    Hemoglobin 8.5 (L) 11.5 - 15.5 g/dL    Hematocrit 25.2 (L) 34.0 - 48.0 %   PTH, INTACT   Result Value Ref Range     (H) 15 - 65 pg/mL   Vitamin B12 & folate   Result Value Ref Range    Vitamin B-12 589 211 - 946 pg/mL    Folate 18.4 4.8 - 24.2 ng/mL   Ferritin   Result Value Ref Range    Ferritin 841 ng/mL   POCT occult blood stool   Result Value Ref Range    OCCULT BLOOD FECAL Negative     QC OK?  OK    POCT Glucose   Result Value Ref Range    Meter Glucose 121 (H) 74 - 99 mg/dL   POCT Glucose   Result Value Ref Range    Meter Glucose 116 (H) 74 - 99 mg/dL RADIOLOGY:  Interpreted by Radiologist.  No orders to display           ------------------------- NURSING NOTES AND VITALS REVIEWED ---------------------------   The nursing notes within the ED encounter and vital signs as below have been reviewed by myself. BP (!) 143/73   Pulse 93   Temp 98.7 °F (37.1 °C) (Oral)   Resp 16   Ht 4' 11\" (1.499 m)   Wt 135 lb (61.2 kg)   SpO2 94%   BMI 27.27 kg/m²   Oxygen Saturation Interpretation: Normal    The patients available past medical records and past encounters were reviewed.         ------------------------------ ED COURSE/MEDICAL DECISION MAKING----------------------  Medications   amLODIPine (NORVASC) tablet 10 mg (10 mg Oral Given 7/14/20 0855)   benzonatate (TESSALON) capsule 200 mg (has no administration in time range)   calcium acetate (PHOSLO) capsule 1,334 mg (0 mg Oral Held 7/14/20 1223)   carvedilol (COREG) tablet 25 mg (25 mg Oral Given 7/14/20 0855)   vitamin D (CHOLECALCIFEROL) tablet 2,000 Units (2,000 Units Oral Given 7/14/20 0855)   famotidine (PEPCID) tablet 20 mg (20 mg Oral Given 7/14/20 0855)   sodium chloride flush 0.9 % injection 10 mL (10 mLs Intravenous Given 7/14/20 0858)   sodium chloride flush 0.9 % injection 10 mL (has no administration in time range)   acetaminophen (TYLENOL) tablet 650 mg (has no administration in time range)     Or   acetaminophen (TYLENOL) suppository 650 mg (has no administration in time range)   polyethylene glycol (GLYCOLAX) packet 17 g (has no administration in time range)   promethazine (PHENERGAN) tablet 12.5 mg (has no administration in time range)     Or   ondansetron (ZOFRAN) injection 4 mg (has no administration in time range)   glucose (GLUTOSE) 40 % oral gel 15 g (has no administration in time range)   dextrose 50 % IV solution (has no administration in time range)   glucagon (rDNA) injection 1 mg (has no administration in time range)   dextrose 5 % solution (has no administration in time range) insulin glargine (LANTUS) injection vial 16 Units (has no administration in time range)   insulin lispro (HUMALOG) injection vial 3 Units (0 Units Subcutaneous Held 7/14/20 1134)   insulin lispro (HUMALOG) injection vial 0-12 Units (0 Units Subcutaneous Held 7/14/20 1133)   insulin lispro (HUMALOG) injection vial 0-6 Units (has no administration in time range)         ED COURSE:       Medical Decision Making: This is a 48-year-old female who presented to the ED for anemia. Patient was seen earlier today and was found to have anemia. Patient imaging labs and visit were reviewed. Patient received transfusion as well as dialysis at that time. Nephrology recommended admission to the hospital for further work-up of her anemia with patient was unable to be admitted at that time due to kids. She states that she returns now due to being able to be admitted to the hospital.  Currently the patient is asymptomatic. Heme occult was negative. H&H stable now at 8.3/44. 5. Due to the patient anemia the patient admitted for further care. Case discussed with Dr. Yamil Hooper who is agreed for admission. I, Dr. Agustina Gee, am the primary provider for this encounter    This patient's ED course included: a personal history and physicial examination and re-evaluation prior to disposition    This patient has remained hemodynamically stable during their ED course. Re-Evaluations:             Re-evaluation. Patients symptoms are improving      Counseling: The emergency provider has spoken with the patient and discussed todays results, in addition to providing specific details for the plan of care and counseling regarding the diagnosis and prognosis. Questions are answered at this time and they are agreeable with the plan.       --------------------------------- IMPRESSION AND DISPOSITION ---------------------------------    IMPRESSION  1. Anemia, unspecified type    2.  ESRD (end stage renal disease) ([facility] DISPOSITION  Disposition: Admit to telemetry  Patient condition is stable    NOTE: This report was transcribed using voice recognition software.  Every effort was made to ensure accuracy; however, inadvertent computerized transcription errors may be present        Omkar Thompson DO  07/14/20 [id number]

## 2020-07-14 NOTE — PROGRESS NOTES
Pt admitted to room 604. She is alert and oriented x4, denies SOB and pain. Head-to-toe and skin assessment complete. Admission database complete. Pt was oriented to room, call-light with-in reach, bed in lowest position and wheels are locked. Physician aware of admission, orders placed. All needs are met at this time. Safety measures are in place.

## 2020-07-14 NOTE — PROGRESS NOTES
Dr. Arnaldo Blandon stopped at desk-ok for patient to discharge-to follow up in his office as outpatient-message sent to Dr. Eulalia Marcano to check discharge plan. Electronically signed by Bryant Hernandez RN on 7/14/2020 at 5:41 PM     Ok to discharge per Dr. Brice Ramirez placed to Dr. Juventino Ziegler for discharge.   Electronically signed by Bryant Hernandez RN on 7/14/2020 at 5:43 PM

## 2020-07-14 NOTE — H&P
[facility] History and Physical      CHIEF COMPLAINT: Generalized weakness    History of Present Illness: 67-year-old female with a history of diabetes mellitus type 1 leading to ESRD on home hemodialysis through left AV fistula cover for acuity. .  The past 1 to 2 weeks has been fatigued to the point that she missed some dialysis sessions. Her hemoglobin was 7.1 on .  2 weeks prior was 9.3 and earlier that month was 11.2. Denies any history of this. No change of bowel habits or color. No abdominal pain. Iron studies done with dialysis show a ferritin of 743 with an iron sat of 25%. Receiving VALENTIN and reportedly received recently. She was sent to the ED yesterday due to above. Underwent dialysis and a transfusion. Seen by nephrology, recommended for admission for further evaluation of this and consideration for hematology consult. She had 2 take care of things at home therefore left and came back. Occult blood test was negative. No family history of anemia. Informant(s) for H&P: Patient    REVIEW OF SYSTEMS:  A comprehensive 14 point review of systems was negative except for: what is in the HPI    PMH:  Past Medical History:   Diagnosis Date    JOLLY (acute kidney injury) ([facility] 2016    AVF (arteriovenous fistula) ([facility] 2018    let arm    Chronic kidney disease     Dialysis AV fistula malfunction, initial encounter ([facility] 2019    DM type 1 (diabetes mellitus, type 1) ([facility]     Encounter regarding vascular access for dialysis for ESRD ([facility] 2018    ESRD (end stage renal disease) ([facility]     Hemodialysis patient ([facility]     amrita dawson  / graft in left arm    Hypertension     Tobacco abuse 2016       Surgical History:  Past Surgical History:   Procedure Laterality Date     SECTION  2013    CYST INCISION AND DRAINAGE  2011    perirectal abscess. [facility].  Dr. Viviana Rizo Left 2019    LEFT ARM FISTULAGRAM, BALLOON ANGIOPLASTY performed by Larissa Salazar MD at [address]  2009    FRACTURE SURGERY  October 11,2012    fracture right ulnar, left tibia, and pelvis    OTHER SURGICAL HISTORY  1017/12    open reduction internal fixation left radial shaft    OTHER SURGICAL HISTORY  08/19/2016    pericardial window    OTHER SURGICAL HISTORY  08/23/2016     r tesio insertion  / remove 8/16/2018    OTHER SURGICAL HISTORY Left 02/17/2017    insertion a-v fistula left arm    AK ANASTOMOSIS,AV,ANY SITE Left 7/17/2018    REVISION AV FISTULA - LEFT ARM performed by Larissa Salazar MD at [facility] NON-TUNNEL CV CATH N/A 5/10/2018    TESIO CATHETER INSERTION performed by Dinora Pérez MD at [address] PRERETINAL MEMBRANE Left 8/28/2018    PARS PLANA VITRECTOMY 25 GAUGE, VITREOUS HEMORRHAGE REMOVAL, ENDO LASER, AIR/FLUID  EXCHANGE LEFT EYE performed by Jarrod South MD at [facility] OR       Medications Prior to Admission:    Prior to Admission medications    Medication Sig Start Date End Date Taking?  Authorizing Provider   amLODIPine (NORVASC) 5 MG tablet TK 1 T PO QD 3/13/20   Historical Provider, MD   bacitracin zinc 500 UNIT/GM ointment JAI A SMALL AMOUNT TO AFFECTED AREA TID 4/15/20   Historical Provider, MD   ibuprofen (ADVIL;MOTRIN) 800 MG tablet TK 1 T PO TID WITH FOOD 2/4/20   Historical Provider, MD   latanoprost (XALATAN) 0.005 % ophthalmic solution INT 1 GTT IN OS HS 1/28/20   Historical Provider, MD   Cholecalciferol (VITAMIN D3) 50 MCG (2000 UT) CAPS Take 1 capsule by mouth every morning LD 8/22/208 4/17/20   Columbus Essex, APRN - CNP   famotidine (PEPCID) 20 MG tablet Take 1 tablet by mouth every morning 2/25/20   Columbus Essex, APRN - CNP   B Complex-C-Folic Acid (YOLANDA-JACOB) TABS Take 1 tablet by mouth Daily with supper 2/25/20   Columbus Essex, APRN - CNP   medroxyPROGESTERone (DEPO-PROVERA) 150 MG/ML injection TO BE BROUGHT TO PHYSICIAN FOR INJECTION 11/17/19   Historical Provider, MD   benzonatate (TESSALON) 200 MG capsule Take 1 capsule by mouth 3 times daily as needed for Cough 12/5/19   Caro Elmore PA-C   albuterol sulfate  (90 Base) MCG/ACT inhaler Inhale 2 puffs into the lungs every 4-6 hours as needed for Wheezing or Shortness of Breath 12/5/19   Caro Elmore PA-C   insulin glargine (TOUJEO SOLOSTAR) 300 UNIT/ML injection pen Inject 16 Units into the skin nightly 11/8/19   Wendy Knutson MD   insulin lispro, 1 Unit Dial, ([facility]) 100 UNIT/ML SOPN Inject 6 Units into the skin 3 times daily (before meals) 11/8/19   Wendy Knutson MD   calcium acetate (PHOSLO) 667 MG capsule Take 2 capsules by mouth 3 times daily (with meals) 11/8/19   Wendy Knutson MD   carvedilol (COREG) 25 MG tablet TK 1 T PO BID 7/7/19   Historical Provider, MD   FREESTYLE LITE strip TEST QID 7/7/19   Historical Provider, MD   Alcohol Swabs (ALCOHOL PREP) 70 % PADS USE 1 PAD QID 6/10/19   Historical Provider, MD   vitamin B-12 (CYANOCOBALAMIN) 100 MCG tablet Vitamin B-12  100 mcg tablet   TK 1 T PO QD    Historical Provider, MD   Insulin Syringe-Needle U-100 ([employer] INSULIN SYRINGE) 31G X 5/16\" 1 ML MISC 1 each by Does not apply route daily 11/13/18   KEMI Carvajal CNP   glucose 4 g chewable tablet Take 1 tablet by mouth as needed for Low blood sugar 10/9/18   KEMI Carvajal CNP   Lactobacillus (PROBIOTIC ACIDOPHILUS) CAPS Take 1 capsule by mouth every morning    Historical Provider, MD       Allergies:    Patient has no known allergies. Social History:    reports that she has been smoking cigarettes. She has smoked for the past 15.00 years. She has never used smokeless tobacco. She reports previous alcohol use. She reports that she does not use drugs. Family History:   family history includes Cancer in her father; Diabetes in her paternal aunt; Stroke in her mother. PHYSICAL EXAM:  Vitals:  BP (!) 154/76   Pulse 97   Temp 98.7 °F (37.1 °C) (Oral)   Resp 16   Ht 4' 11\" (1.499 m)   Wt 135 lb (61.2 kg)   SpO2 97%   BMI 27.27 kg/m²   General Appearance: alert and oriented to person, place and time and in no acute distress  Skin: warm and dry, turgor not diminished  Head: normocephalic and atraumatic  Eyes: pupils equal, round, and reactive to light, extraocular eye movements intact, conjunctivae normal  Neck: neck supple and non tender without mass   Pulmonary/Chest: clear to auscultation bilaterally- no wheezes, rales or rhonchi, normal air movement, no respiratory distress  Cardiovascular: normal rate, normal S1 and S2 and no M/R/R  Abdomen: soft, non-tender, non-distended, normal bowel sounds, no masses or organomegaly  Extremities: no cyanosis, no clubbing and no edema. Left. Neurologic: no cranial nerve deficit and speech normal        LABS:  Recent Labs     07/13/20  1131 07/14/20  0317    134   K 4.6 4.9   CL 95* 94*   CO2 30* 29   BUN 42* 22*   CREATININE 12.9* 7.7*   GLUCOSE 303* 442*   CALCIUM 9.1 9.2       Recent Labs     07/13/20  1131 07/14/20  0317   WBC 5.0 6.3   RBC 2.06* 2.49*   HGB 6.9* 8.3*   HCT 20.9* 24.5*   .5* 98.4   MCH 33.5 33.3   MCHC 33.0 33.9   RDW 15.8* 16.5*    141   MPV 9.9 9.8       No results for input(s): POCGLU in the last 72 hours. Radiology:   No orders to display       EKG: No acute normality per my read    ASSESSMENT:    Macrocytic anemia:?  Acute blood loss  Generalized weakness likely symptomatic anemia  ESRD on hemodialysis  Diabetes mellitus type 1 uncontrolled high  Tobacco abuse      PLAN:  Anemia:?  Acute blood loss. Occult blood test negative. Iron replete. Had high MCV but got transfused therefore unable to check reticulocyte count reliably. Check vitamin B12, folate, PTH. Consult hematology. ESRD on hemodialysis: Received dialysis session yesterday.   Consult nephrology if she remains inpatient by tomorrow    Diabetes mellitus type 1: Basal/bolus with SSI    Tobacco abuse: Declines patch    Code Status: Full  DVT prophylaxis: None      NOTE: This report was transcribed using voice recognition software. Every effort was made to ensure accuracy; however, inadvertent computerized transcription errors may be present.   Electronically signed by Roxy Berger MD on 7/14/2020 at 5:31 AM

## 2020-07-14 NOTE — PROGRESS NOTES
[facility] Quality Flow/Interdisciplinary Rounds Progress Note        Quality Flow Rounds held on July 14, 2020    Disciplines Attending:  Bedside Nurse, ,  and Nursing Unit Leadership    Adali Frenandez was admitted on 7/14/2020  1:51 AM    Anticipated Discharge Date:       Disposition:    Edgar Score:       Readmission Risk              Risk of Unplanned Readmission:        13           Discussed patient goal for the day, patient clinical progression, and barriers to discharge. The following Goal(s) of the Day/Commitment(s) have been identified:  Check renal plan.       Judy Yanez  July 14, 2020

## 2020-07-14 NOTE — PROGRESS NOTES
[facility]) Hospitalist Progress Note    Admission Date  7/14/2020  1:51 AM  Chief Complaint Anemia  Admit Dx   Anemia [D64.9]    Subjective  History of Present Illness  7/14/2020 patient is a 26-year-old female with past medical history pertinent for end-stage renal disease on hemodialysis, type 1 diabetes, hypertension, and tobacco abuse who had originally presented to the ER yesterday, but subsequently signed out [address] as she had no one to care for her child if she were admitted. She reported back to the ER this morning. Noting fatigue for the past 1 to 2 weeks, no abdominal pain, no lightheadedness, dizziness. No obvious melena or hematochezia. Review of Systems not obtained as pt admitted, seen, and billed by a separate physician on this service on this calendar date    Objective  Physical Exam not obtained as pt admitted, seen, and billed by a separate physician on this service on this calendar date    Labs  Recent Labs     07/13/20  1131 07/14/20  0317 07/14/20  1143    134  --    K 4.6 4.9  --    CO2 30* 29  --    BUN 42* 22*  --    CREATININE 12.9* 7.7*  --    GLUCOSE 303* 442*  --    CALCIUM 9.1 9.2  --    WBC 5.0 6.3  --    RBC 2.06* 2.49*  --    HGB 6.9* 8.3* 8.5*   HCT 20.9* 24.5* 25.2*    141  --      Radiology  Xr Chest Standard (2 Vw)  Result Date: 7/13/2020  Limited study, due to a poor inspiratory effort, no gross abnormality     Assessment / Plan  #1  Acute on chronic anemia, etiology unclear: Baseline hemoglobin appears to be somewhere between 10 and 11 g/dL, was found to be 6.9 in the ER yesterday. Patient was transfused 1 unit, currently up to 8.5. Indices show normal MCV and MCHC. Stool was negative for occult blood. Iron studies unremarkable. Checking vitamin B12, folate, PTH. Hematology consulted. Hold chemical DVT prophylaxis.   Follow hgb closely    #2 ESRD on hemodialysis: Nephrology consultation for HD needs    #3 type 1 diabetes: Basal bolus insulin with sliding scale AC at bedtime, hemoglobin A1c shows a somewhat poor control at 8.3%.   Hypoglycemic protocol    Code status  Full  DVT prophylaxis SCDs  Disposition  Home once medically stable    Electronically signed by Morelia Blanco DO on 7/14/2020 at 3:16 PM

## 2020-07-14 NOTE — PROGRESS NOTES
Pt alert and oriented x4, denies pain and SOB. Discharge instructions were explained to patient and a copy was given, she voiced understanding.  All questions answered, all needs are met at this time, Safety measures are in place

## 2020-07-14 NOTE — CONSULTS
deficiency, unspecified 2020    Periorbital cellulitis     Periorbital cellulitis of right eye 2019    Nasal polyp 2019    Hyperkalemia     Facial cellulitis 2018    Diabetes mellitus type 1, uncontrolled ([facility] 2018    Chronic deep vein thrombosis (DVT) of right upper extremity ([facility] 2018    HTN (hypertension), benign 10/17/2017    ESRD (end stage renal disease) on dialysis ([facility] 2016        Past Surgical History:   Procedure Laterality Date     SECTION  2013    CYST INCISION AND DRAINAGE  2011    perirectal abscess. [facility]. Dr. Carolina Leal Left 2019    LEFT ARM FISTULAGRAM, BALLOON ANGIOPLASTY performed by Yashira Amado MD at [address] [place] [address]      FRACTURE SURGERY      fracture right ulnar, left tibia, and pelvis    OTHER SURGICAL HISTORY      open reduction internal fixation left radial shaft    OTHER SURGICAL HISTORY  2016    pericardial window    OTHER SURGICAL HISTORY  2016     r tesio insertion  / remove 2018    OTHER SURGICAL HISTORY Left 2017    insertion a-v fistula left arm    AR ANASTOMOSIS,AV,ANY SITE Left 2018    REVISION AV FISTULA - LEFT ARM performed by Yashira Amado MD at [facility] NON-TUNNEL CV CATH N/A 5/10/2018    TESIO CATHETER INSERTION performed by Campbell Anderson MD at [address] PRERETINAL MEMBRANE Left 2018    PARS PLANA VITRECTOMY 25 GAUGE, VITREOUS HEMORRHAGE REMOVAL, ENDO LASER, AIR/FLUID  EXCHANGE LEFT EYE performed by Feroz Tran MD at [facility] OR       Family History  Family History   Problem Relation Age of Onset    Stroke Mother     Cancer Father     Diabetes Paternal Aunt        Social History    TOBACCO:   reports that she has been smoking cigarettes. She has smoked for the past 15.00 years.  She has never used smokeless 2020    CO2 29 2020    BUN 22 (H) 2020    CREATININE 7.7 (H) 2020    GLUCOSE 442 (H) 2020    CALCIUM 9.2 2020    PROT 7.0 2019    LABALBU 3.7 2019    BILITOT 0.3 2019    ALKPHOS 60 2019    AST 12 2019    ALT 8 2019    LABGLOM 7 2020    GFRAA 7 2020       Lab Results   Component Value Date    IRON 26 (L) 2018    TIBC 143 (L) 2018    FERRITIN 841 2020           Radiology-    Xr Chest Standard (2 Vw)    Result Date: 2020  Patient MRN: [id number] : 1985 Age:  38 years Gender: Female Order Date: 2020 11:30 AM Exam: XR CHEST (2 VW) Number of Images: 2 views Indication:   Shortness of breath Shortness of breath Comparison: None. Findings: The heart appears prominent The pulmonary vasculature appears to be crowded The aorta appears to be tortuous There is a poor inspiratory effort     Limited study, due to a poor inspiratory effort, no gross abnormality         ASSESSMENT/PLAN : 69-year-old woman    Acute on chronic anemia. Baseline hemoglobin runs between 10 and 11 but she had an acute drop to 7 and required transfusion with 1 unit of packed RBC with improvement in her hemoglobin to 8.5 today. Her iron studies will be inaccurate at this time following transfusion. Her B12 and folate were normal.    She likely has intermittent GI blood losses superimposed on anemia of chronic kidney failure. Blood smear will be reviewed along with reticulocyte count and serum protein electrophoresis. LDH and haptoglobin will be checked. There is no clinical suggestion of underlying MDS. To continue EPO supplements through nephrology and on hemodialysis. May be discharged home. She will follow as outpatient          Cathy Pereira M.D., F.A.C.P.   Electronically signed 2020 at 4:21 PM

## 2020-07-15 LAB
ALBUMIN SERPL-MCNC: 3.2 G/DL (ref 3.5–4.7)
ALPHA-1-GLOBULIN: 0.2 G/DL (ref 0.2–0.4)
ALPHA-2-GLOBULIN: 0.5 G/FL (ref 0.5–1)
BETA GLOBULIN: 1.1 G/DL (ref 0.8–1.3)
ELECTROPHORESIS: ABNORMAL
GAMMA GLOBULIN: 1.5 G/DL (ref 0.7–1.6)
PATHOLOGIST REVIEW: NORMAL
TOTAL PROTEIN: 6.5 G/DL (ref 6.4–8.3)

## 2020-07-17 ENCOUNTER — VIRTUAL VISIT (OUTPATIENT)
Dept: FAMILY MEDICINE CLINIC | Age: 35
End: 2020-07-17
Payer: MEDICARE

## 2020-07-17 PROCEDURE — 99422 OL DIG E/M SVC 11-20 MIN: CPT | Performed by: NURSE PRACTITIONER

## 2020-07-17 NOTE — PROGRESS NOTES
TeleMedicine Patient Consent    This visit was performed as a virtual video visit using a synchronous, two-way, audio-video telehealth technology platform. Patient identification was verified at the start of the visit, including the patient's telephone number and physical location. I discussed with the patient the nature of our telehealth visits, that:     1. Due to the nature of an audio- video modality, the only components of a physical exam that could be done are the elements supported by direct observation. 2. I would evaluate the patient and recommend diagnostics and treatments based on my assessment. 3. If it was felt that the patient should be evaluated in clinic or an emergency room setting, then they would be directed there. 4. Our sessions are not being recorded and that personal health information is protected. 5. Our team would provide follow up care in person if/when the patient needs it. Patient does agree to proceed with telemedicine consultation. Patient's location: [place] personal vehicle  Physician  location other other people involved in call n/a    HPI:  Patient comes intoday for No chief complaint on file. .    Did dialysis last night & she has gone 64.4 last night after dialysis & today was 63.0 this morning. She has appointment scheduled on the 28th with the NP at Dr. Alia Fitch office. Prior to Visit Medications    Medication Sig Taking?  Authorizing Provider   amLODIPine (NORVASC) 5 MG tablet TK 1 T PO QD  Historical Provider, MD   bacitracin zinc 500 UNIT/GM ointment JAI A SMALL AMOUNT TO AFFECTED AREA TID  Historical Provider, MD   ibuprofen (ADVIL;MOTRIN) 800 MG tablet TK 1 T PO TID WITH FOOD  Historical Provider, MD   latanoprost (XALATAN) 0.005 % ophthalmic solution INT 1 GTT IN OS HS  Historical Provider, MD   Cholecalciferol (VITAMIN D3) 50 MCG (2000 UT) CAPS Take 1 capsule by mouth every morning LD 8/22/208  Allyn Hernandez APRN - CNP   famotidine (PEPCID) 20 MG tablet Take 1 tablet by mouth every morning  KEMI Zazueta CNP   B Complex-C-Folic Acid (YOLANDA-JACOB) TABS Take 1 tablet by mouth Daily with supper  KEMI Zazueta CNP   medroxyPROGESTERone (DEPO-PROVERA) 150 MG/ML injection TO BE BROUGHT TO PHYSICIAN FOR INJECTION  Historical Provider, MD   benzonatate (TESSALON) 200 MG capsule Take 1 capsule by mouth 3 times daily as needed for Cough  Caro Elmore PA-C   albuterol sulfate  (90 Base) MCG/ACT inhaler Inhale 2 puffs into the lungs every 4-6 hours as needed for Wheezing or Shortness of Breath  Caro Elmore PA-C   insulin glargine (TOUJEO SOLOSTAR) 300 UNIT/ML injection pen Inject 16 Units into the skin nightly  Harding Kussmaul, MD   insulin lispro, 1 Unit Dial, (HUMALOG KWIKPEN) 100 UNIT/ML SOPN Inject 6 Units into the skin 3 times daily (before meals)  Harding Kussmaul, MD   calcium acetate (PHOSLO) 667 MG capsule Take 2 capsules by mouth 3 times daily (with meals)  Harding Kussmaul, MD   carvedilol (COREG) 25 MG tablet TK 1 T PO BID  Historical Provider, MD   FREESTYLE LITE strip TEST QID  Historical Provider, MD   Alcohol Swabs (ALCOHOL PREP) 70 % PADS USE 1 PAD QID  Historical Provider, MD   vitamin B-12 (CYANOCOBALAMIN) 100 MCG tablet Vitamin B-12  100 mcg tablet   TK 1 T PO QD  Historical Provider, MD   Insulin Syringe-Needle U-100 ([employer] INSULIN SYRINGE) 31G X 5/16\" 1 ML MISC 1 each by Does not apply route daily  KEMI Preston CNP   glucose 4 g chewable tablet Take 1 tablet by mouth as needed for Low blood sugar  KEMI Preston CNP   Lactobacillus (PROBIOTIC ACIDOPHILUS) CAPS Take 1 capsule by mouth every morning  Historical Provider, MD     No Known Allergies    Review of Systems    Review of Systems   Constitutional: Positive for appetite change, diaphoresis, fatigue and unexpected weight change. Negative for activity change, chills and fever.    HENT: Negative for congestion, ear pain, sinus pain and sneezing. Eyes: Negative for pain and visual disturbance. Respiratory: Negative for cough, chest tightness, shortness of breath and wheezing. Cardiovascular: Negative for chest pain, palpitations and leg swelling. Gastrointestinal: Negative for constipation, diarrhea, nausea and vomiting. Endocrine: Negative for cold intolerance, heat intolerance and polyuria. Genitourinary: Negative for difficulty urinating. Musculoskeletal: Negative for arthralgias and myalgias. Skin: Negative for color change and rash. Neurological: Positive for dizziness. Negative for light-headedness and headaches. Hematological: Negative for adenopathy. Does not bruise/bleed easily. Psychiatric/Behavioral: Negative for agitation, decreased concentration, sleep disturbance and suicidal ideas. The patient is not nervous/anxious. VS:  There were no vitals taken for this visit. Pt was unable to provide vitals for today's visit. Physical Exam    Physical Exam  Vitals signs reviewed. Constitutional:       General: She is not in acute distress. Appearance: Normal appearance. She is well-developed and normal weight. She is not ill-appearing, toxic-appearing or diaphoretic. HENT:      Head: Normocephalic and atraumatic. Comments: Chronic facial edema     Right Ear: External ear normal.      Left Ear: External ear normal.      Nose: Nose normal.      Mouth/Throat:      Mouth: Mucous membranes are moist.      Pharynx: Oropharynx is clear. Eyes:      Extraocular Movements: Extraocular movements intact. Conjunctiva/sclera: Conjunctivae normal.      Pupils: Pupils are equal, round, and reactive to light. Neck:      Musculoskeletal: Normal range of motion. Thyroid: No thyromegaly. Vascular: No JVD. Cardiovascular:      Pulses: Normal pulses. Heart sounds: No murmur. Pulmonary:      Effort: Pulmonary effort is normal. No respiratory distress. Breath sounds: Normal breath sounds.  No wheezing. Chest:      Chest wall: No tenderness. Abdominal:      General: There is no distension. Palpations: Abdomen is soft. Tenderness: There is no abdominal tenderness. Musculoskeletal: Normal range of motion. General: No swelling or deformity. Right lower leg: No edema. Left lower leg: No edema. Skin:     General: Skin is warm and dry. Capillary Refill: Capillary refill takes less than 2 seconds. Findings: No bruising, erythema or rash. Neurological:      General: No focal deficit present. Mental Status: She is alert and oriented to person, place, and time. Mental status is at baseline. Cranial Nerves: No cranial nerve deficit. Sensory: No sensory deficit. Motor: No weakness or abnormal muscle tone. Coordination: Coordination normal.      Gait: Gait normal.      Deep Tendon Reflexes: Reflexes normal.   Psychiatric:         Mood and Affect: Mood normal.         Behavior: Behavior normal.         Thought Content:  Thought content normal.         Judgment: Judgment normal.     Health Maintenance    BP Readings from Last 1 Encounters:   07/14/20 (!) 143/73    Recheck if >140/90  Hemoglobin A1C (%)   Date Value   07/14/2020 8.3 (H)     Plan:    Diagnoses and all orders for this visit:    Gastroesophageal reflux disease without esophagitis  -     Bubba Underwood MD, [facility], [place]    Symptomatic anemia  -     Bubba Underwood MD, General Surgery, [facility]    ESRD (end stage renal disease) on dialysis [facility])  -     Continue to follow with dialysis   - Follow with endo for A1c control    Anemia due to gastrointestinal blood loss  -     [facility]  - Hx of gastric ulcer    Acute congestive heart failure, unspecified heart failure type [facility])  - Follow with cardiology    Chronic deep vein thrombosis (DVT) of other vein of right upper extremity (HCC)/Anemia due to chronic kidney disease, on chronic dialysis ([facility]  -     Victoria Singh MD, Hematology and Oncology, [place] ([facility])    Greater than 25  Minutes was spent with patient and more than 50% of the time was spent face to facecounseling and educating regarding diagnoses       Time spent: Greater than 25    This visit was completed virtually using D[employer]. me

## 2020-07-21 PROBLEM — I50.9 ACUTE CONGESTIVE HEART FAILURE (HCC): Status: ACTIVE | Noted: 2020-07-21

## 2020-07-21 ASSESSMENT — ENCOUNTER SYMPTOMS
SINUS PAIN: 0
COLOR CHANGE: 0
DIARRHEA: 0
EYE PAIN: 0
COUGH: 0
CONSTIPATION: 0
NAUSEA: 0
WHEEZING: 0
CHEST TIGHTNESS: 0
VOMITING: 0
SHORTNESS OF BREATH: 0

## 2020-07-27 ENCOUNTER — TELEPHONE (OUTPATIENT)
Dept: SURGERY | Age: 35
End: 2020-07-27

## 2020-07-27 NOTE — TELEPHONE ENCOUNTER
First attempt, Left message to schedule pt with Dr. Chelsey Mckinnon at any location for a EGD/Colonoscopy consult.   Electronically signed by Lisa Woodard on 7/27/20 at 10:44 AM EDT

## 2020-07-30 ENCOUNTER — TELEPHONE (OUTPATIENT)
Dept: SURGERY | Age: 35
End: 2020-07-30

## 2020-07-30 RX ORDER — ACETAMINOPHEN 160 MG
TABLET,DISINTEGRATING ORAL
Qty: 30 CAPSULE | Refills: 2 | Status: SHIPPED
Start: 2020-07-30 | End: 2021-01-27

## 2020-07-30 NOTE — TELEPHONE ENCOUNTER
Second attempt, Left message to schedule pt with Dr. Preet Barth at any location for a EGD/Colonoscopy consult.   Electronically signed by Onesimo Case on 7/30/20 at 8:53 AM EDT

## 2020-08-03 ENCOUNTER — TELEPHONE (OUTPATIENT)
Dept: SURGERY | Age: 35
End: 2020-08-03

## 2020-08-24 ENCOUNTER — APPOINTMENT (OUTPATIENT)
Dept: GENERAL RADIOLOGY | Age: 35
End: 2020-08-24
Payer: MEDICARE

## 2020-08-24 ENCOUNTER — HOSPITAL ENCOUNTER (EMERGENCY)
Age: 35
Discharge: HOME | End: 2020-08-24
Attending: EMERGENCY MEDICINE
Payer: MEDICARE

## 2020-08-24 VITALS
OXYGEN SATURATION: 98 % | RESPIRATION RATE: 16 BRPM | HEIGHT: 59 IN | WEIGHT: 135 LBS | TEMPERATURE: 98.5 F | SYSTOLIC BLOOD PRESSURE: 110 MMHG | BODY MASS INDEX: 27.21 KG/M2 | DIASTOLIC BLOOD PRESSURE: 62 MMHG | HEART RATE: 94 BPM

## 2020-08-24 LAB
ALBUMIN SERPL-MCNC: 3.5 G/DL (ref 3.5–5.2)
ALP BLD-CCNC: 61 U/L (ref 35–104)
ALT SERPL-CCNC: 14 U/L (ref 0–32)
ANION GAP SERPL CALCULATED.3IONS-SCNC: 16 MMOL/L (ref 7–16)
AST SERPL-CCNC: 11 U/L (ref 0–31)
BASOPHILS ABSOLUTE: 0.03 E9/L (ref 0–0.2)
BASOPHILS RELATIVE PERCENT: 0.4 % (ref 0–2)
BILIRUB SERPL-MCNC: 0.3 MG/DL (ref 0–1.2)
BUN BLDV-MCNC: 37 MG/DL (ref 6–20)
CALCIUM SERPL-MCNC: 9 MG/DL (ref 8.6–10.2)
CHLORIDE BLD-SCNC: 93 MMOL/L (ref 98–107)
CO2: 26 MMOL/L (ref 22–29)
CREAT SERPL-MCNC: 13.2 MG/DL (ref 0.5–1)
EOSINOPHILS ABSOLUTE: 0.24 E9/L (ref 0.05–0.5)
EOSINOPHILS RELATIVE PERCENT: 2.8 % (ref 0–6)
GFR AFRICAN AMERICAN: 4
GFR NON-AFRICAN AMERICAN: 4 ML/MIN/1.73
GLUCOSE BLD-MCNC: 307 MG/DL (ref 74–99)
HCT VFR BLD CALC: 24.6 % (ref 34–48)
HEMOGLOBIN: 8.5 G/DL (ref 11.5–15.5)
IMMATURE GRANULOCYTES #: 0.03 E9/L
IMMATURE GRANULOCYTES %: 0.4 % (ref 0–5)
LYMPHOCYTES ABSOLUTE: 1.47 E9/L (ref 1.5–4)
LYMPHOCYTES RELATIVE PERCENT: 17.2 % (ref 20–42)
MAGNESIUM: 2.5 MG/DL (ref 1.6–2.6)
MCH RBC QN AUTO: 34.3 PG (ref 26–35)
MCHC RBC AUTO-ENTMCNC: 34.6 % (ref 32–34.5)
MCV RBC AUTO: 99.2 FL (ref 80–99.9)
MONOCYTES ABSOLUTE: 0.44 E9/L (ref 0.1–0.95)
MONOCYTES RELATIVE PERCENT: 5.1 % (ref 2–12)
NEUTROPHILS ABSOLUTE: 6.36 E9/L (ref 1.8–7.3)
NEUTROPHILS RELATIVE PERCENT: 74.1 % (ref 43–80)
PDW BLD-RTO: 17.3 FL (ref 11.5–15)
PLATELET # BLD: 137 E9/L (ref 130–450)
PMV BLD AUTO: 10.3 FL (ref 7–12)
POTASSIUM SERPL-SCNC: 3.8 MMOL/L (ref 3.5–5)
PRO-BNP: ABNORMAL PG/ML (ref 0–125)
RBC # BLD: 2.48 E12/L (ref 3.5–5.5)
SODIUM BLD-SCNC: 135 MMOL/L (ref 132–146)
TOTAL PROTEIN: 6.6 G/DL (ref 6.4–8.3)
WBC # BLD: 8.6 E9/L (ref 4.5–11.5)

## 2020-08-24 PROCEDURE — 6370000000 HC RX 637 (ALT 250 FOR IP): Performed by: EMERGENCY MEDICINE

## 2020-08-24 PROCEDURE — 99283 EMERGENCY DEPT VISIT LOW MDM: CPT

## 2020-08-24 PROCEDURE — 83880 ASSAY OF NATRIURETIC PEPTIDE: CPT

## 2020-08-24 PROCEDURE — 80053 COMPREHEN METABOLIC PANEL: CPT

## 2020-08-24 PROCEDURE — 71045 X-RAY EXAM CHEST 1 VIEW: CPT

## 2020-08-24 PROCEDURE — 83735 ASSAY OF MAGNESIUM: CPT

## 2020-08-24 PROCEDURE — 46040 I&D ISCHIORCT&/PERIRCT ABSC: CPT

## 2020-08-24 PROCEDURE — 85025 COMPLETE CBC W/AUTO DIFF WBC: CPT

## 2020-08-24 PROCEDURE — 2500000003 HC RX 250 WO HCPCS: Performed by: EMERGENCY MEDICINE

## 2020-08-24 RX ORDER — LIDOCAINE HYDROCHLORIDE 20 MG/ML
10 INJECTION, SOLUTION INFILTRATION; PERINEURAL ONCE
Status: COMPLETED | OUTPATIENT
Start: 2020-08-24 | End: 2020-08-24

## 2020-08-24 RX ORDER — DOXYCYCLINE HYCLATE 100 MG/1
100 CAPSULE ORAL ONCE
Status: COMPLETED | OUTPATIENT
Start: 2020-08-24 | End: 2020-08-24

## 2020-08-24 RX ORDER — DOXYCYCLINE HYCLATE 100 MG
100 TABLET ORAL 2 TIMES DAILY
Qty: 20 TABLET | Refills: 0 | Status: SHIPPED | OUTPATIENT
Start: 2020-08-24 | End: 2020-09-03

## 2020-08-24 RX ADMIN — LIDOCAINE HYDROCHLORIDE 10 ML: 20 INJECTION, SOLUTION INFILTRATION; PERINEURAL at 20:17

## 2020-08-24 RX ADMIN — MUPIROCIN: 20 OINTMENT TOPICAL at 20:28

## 2020-08-24 RX ADMIN — DOXYCYCLINE HYCLATE 100 MG: 100 CAPSULE ORAL at 20:16

## 2020-08-24 ASSESSMENT — PAIN SCALES - GENERAL
PAINLEVEL_OUTOF10: 10
PAINLEVEL_OUTOF10: 10

## 2020-08-24 ASSESSMENT — PAIN DESCRIPTION - LOCATION: LOCATION: BUTTOCKS

## 2020-08-24 ASSESSMENT — PAIN DESCRIPTION - ORIENTATION: ORIENTATION: LEFT

## 2020-08-24 ASSESSMENT — PAIN DESCRIPTION - PAIN TYPE: TYPE: ACUTE PAIN

## 2020-08-24 ASSESSMENT — PAIN DESCRIPTION - DESCRIPTORS: DESCRIPTORS: BURNING;ACHING

## 2020-08-24 NOTE — ED PROVIDER NOTES
Department of Emergency Medicine   ED  Provider Note  Admit Date/RoomTime: 8/24/2020  5:58 PM  ED Room: 15/15          History of Present Illness:  8/24/20, Time: 6:07 PM EDT  Chief Complaint   Patient presents with    Abscess     2 abscess in perineal area    Shortness of Breath     dialysis pt, started yesterday evening                Kevin Hernandez is a 38 y.o. female presenting to the ED for shortness of breath and perirectal abscess, beginning 2-3 days ago. The complaint has been persistent, mild in severity, and worsened by nothing. Patient states that she performs home hemodialysis Monday/Wednesday/Friday/Saturday but has recently had difficulty, last dialysis was Saturday. She was unable to perform dialysis today, but when she spoke with the dialysis center they had concerns with her shortness of breath and directed her to the ED. She states that she will typically take off a lower volume with her dialysis and at times will become slightly volume overloaded and short of breath. She feels that this is occurring again. She denies any recent fevers or cough. She denies any chest pain or discomfort. She states that she has had some discomfort in the perirectal region, has noted to abscesses in that area that are draining foul-smelling purulence. She denies any dysuria, no changes in bowel habits.     Review of Systems:   Pertinent positives and negatives are stated within HPI, all other systems reviewed and are negative.        --------------------------------------------- PAST HISTORY ---------------------------------------------  Past Medical History:  has a past medical history of JOLLY (acute kidney injury) ([facility] [address] [facility] Chronic kidney disease, Dialysis AV fistula malfunction, initial encounter ([facility] DM type 1 (diabetes mellitus, type 1) ([facility] Encounter regarding vascular access for dialysis for ESRD ([facility] ESRD (end stage renal disease) ([facility] Hemodialysis patient [facility]), Hypertension, and Tobacco abuse. Past Surgical History:  has a past surgical history that includes Dilation and curettage of uterus (); cyst incision and drainage (2011); other surgical history (); fracture surgery (); other surgical history (2016); other surgical history (2016); other surgical history (Left, 2017);  section (); pr insert non-tunnel cv cath (N/A, 5/10/2018); pr anastomosis,av,any site (Left, 2018); pr vitrectomy pars plana remove preretinal membrane (Left, 2018); and Dialysis fistula creation (Left, 2019). Social History:  reports that she has been smoking cigarettes. She has smoked for the past 15.00 years. She has never used smokeless tobacco. She reports previous alcohol use. She reports that she does not use drugs. Family History: family history includes Cancer in her father; Diabetes in her paternal aunt; Stroke in her mother. . Unless otherwise noted, family history is non contributory    The patients home medications have been reviewed. Allergies: Patient has no known allergies. ---------------------------------------------------PHYSICAL EXAM--------------------------------------    Constitutional/General: Alert and oriented x3  Head: Normocephalic and atraumatic  Eyes: PERRL, EOMI, sclera non icteric  Mouth: Oropharynx clear, handling secretions, no trismus, no asymmetry of the posterior oropharynx or uvular edema  Neck: Supple, full ROM, no stridor, no meningeal signs  Respiratory: Lungs clear to auscultation bilaterally, no wheezes, rales, or rhonchi. Not in respiratory distress  Cardiovascular:  Regular rate. Regular rhythm. No murmurs, no aortic murmurs, no gallops, or rubs. 2+ distal pulses. Equal extremity pulses. Chest: No chest wall tenderness  GI:  Abdomen Soft, Non tender, Non distended. No rebound, guarding, or rigidity. No pulsatile masses.   Musculoskeletal: Moves all extremities x 4. Warm and well perfused, no clubbing, cyanosis, or edema. Capillary refill <3 seconds  Integument: skin warm and dry. No rashes. Area of fluctuance approximately 2 cm in diameter to the left inguinal area and a 3 cm area of fluctuance to the left gluteal fold in the perineum  Neurologic: GCS 15, no focal deficits, symmetric strength 5/5 in the upper and lower extremities bilaterally  Psychiatric: Normal Affect          -------------------------------------------------- RESULTS -------------------------------------------------  I have personally reviewed all laboratory and imaging results for this patient. Results are listed below.      LABS: (Lab results interpreted by me)  Results for orders placed or performed during the hospital encounter of 08/24/20   CBC Auto Differential   Result Value Ref Range    WBC 8.6 4.5 - 11.5 E9/L    RBC 2.48 (L) 3.50 - 5.50 E12/L    Hemoglobin 8.5 (L) 11.5 - 15.5 g/dL    Hematocrit 24.6 (L) 34.0 - 48.0 %    MCV 99.2 80.0 - 99.9 fL    MCH 34.3 26.0 - 35.0 pg    MCHC 34.6 (H) 32.0 - 34.5 %    RDW 17.3 (H) 11.5 - 15.0 fL    Platelets 797 600 - 609 E9/L    MPV 10.3 7.0 - 12.0 fL    Neutrophils % 74.1 43.0 - 80.0 %    Immature Granulocytes % 0.4 0.0 - 5.0 %    Lymphocytes % 17.2 (L) 20.0 - 42.0 %    Monocytes % 5.1 2.0 - 12.0 %    Eosinophils % 2.8 0.0 - 6.0 %    Basophils % 0.4 0.0 - 2.0 %    Neutrophils Absolute 6.36 1.80 - 7.30 E9/L    Immature Granulocytes # 0.03 E9/L    Lymphocytes Absolute 1.47 (L) 1.50 - 4.00 E9/L    Monocytes Absolute 0.44 0.10 - 0.95 E9/L    Eosinophils Absolute 0.24 0.05 - 0.50 E9/L    Basophils Absolute 0.03 0.00 - 0.20 E9/L   Comprehensive Metabolic Panel   Result Value Ref Range    Sodium 135 132 - 146 mmol/L    Potassium 3.8 3.5 - 5.0 mmol/L    Chloride 93 (L) 98 - 107 mmol/L    CO2 26 22 - 29 mmol/L    Anion Gap 16 7 - 16 mmol/L    Glucose 307 (H) 74 - 99 mg/dL    BUN 37 (H) 6 - 20 mg/dL    CREATININE 13.2 (HH) 0.5 - 1.0 mg/dL    GFR Non-African American 4 >=60 mL/min/1.73    GFR African American 4     Calcium 9.0 8.6 - 10.2 mg/dL    Total Protein 6.6 6.4 - 8.3 g/dL    Alb 3.5 3.5 - 5.2 g/dL    Total Bilirubin 0.3 0.0 - 1.2 mg/dL    Alkaline Phosphatase 61 35 - 104 U/L    ALT 14 0 - 32 U/L    AST 11 0 - 31 U/L   Brain Natriuretic Peptide   Result Value Ref Range    Pro-BNP 21,930 (H) 0 - 125 pg/mL   Magnesium   Result Value Ref Range    Magnesium 2.5 1.6 - 2.6 mg/dL   ,       RADIOLOGY:  Interpreted by Radiologist unless otherwise specified  XR CHEST PORTABLE   Final Result      No airspace opacities or pleural effusion.                      ------------------------- NURSING NOTES AND VITALS REVIEWED ---------------------------   The nursing notes within the ED encounter and vital signs as below have been reviewed by myself  /62   Pulse 94   Temp 98.5 °F (36.9 °C) (Oral)   Resp 16   Ht 4' 11\" (1.499 m)   Wt 135 lb (61.2 kg)   SpO2 98%   BMI 27.27 kg/m²     Oxygen Saturation Interpretation: Normal    The patients available past medical records and past encounters were reviewed. ------------------------------ ED COURSE/MEDICAL DECISION MAKING----------------------  Medications   lidocaine 2 % injection 10 mL (10 mLs Intradermal Given 8/24/20 2017)   doxycycline hyclate (VIBRAMYCIN) capsule 100 mg (100 mg Oral Given 8/24/20 2016)               Medical Decision Making:     I, Dr. Juan Read, am the primary provider of record    62-year-old female with history of hemodialysis presenting with some shortness of breath and 2 different abscesses. They are both fluctuant and would more than likely benefit from incision and drainage. She arrives hemodynamically stable and well-appearing, no increased work of breathing or hypoxia. Patient shortness of breath may be related to volume overload. She has not had dialysis since Saturday, 48 hours ago. Chest x-ray shows no vascular congestion.   Patient's potassium is normal, metabolic panel within acceptable limits. Patient does not appear to have need for emergent hemodialysis based on symptoms and laboratory testing. Abscess was incised and drained as below. She will be placed on doxycyclin, agrees to have dialysis tomorrow morning. Instruction to return for any changes in condition or new symptoms. PROCEDURE  8/25/20           INCISION AND DRAINAGE  Risks, benefits and alternatives (for applicable procedures below) described. Performed By: Hellen Pretty DO. Indication: Abscess and perirectal abscess  Informed consent obtained: The patient provided verbal consent for this procedure. .  Prep: The skin was cleansed with povidone iodine, wiped with isopropyl alcohol and draped in a sterile fashion. Anesthetic: The wound area was anesthetized with Lidocaine 1% without epinephrine. Incision: Soft tissue abscess of Labia and Perineum was Incised by scalpal and minimal fluid was drained. A wound culture was not obtained. The wound  was not irrigated and was not packed with iodoform gauze. The wound was then covered with a sterile dressing. Patient tolerated the procedure well. Re-Evaluations: This patient's ED course included: a personal history and physicial examination    This patient has remained hemodynamically stable during their ED course. Counseling: The emergency provider has spoken with the patient and discussed todays results, in addition to providing specific details for the plan of care and counseling regarding the diagnosis and prognosis. Questions are answered at this time and they are agreeable with the plan.       --------------------------------- IMPRESSION AND DISPOSITION ---------------------------------    IMPRESSION  1. Abscess        DISPOSITION  Disposition: Discharge to home  Patient condition is stable        NOTE: This report was transcribed using voice recognition software.  Every effort was made to ensure accuracy;

## 2020-08-25 ENCOUNTER — CARE COORDINATION (OUTPATIENT)
Dept: CARE COORDINATION | Age: 35
End: 2020-08-25

## 2020-08-25 ENCOUNTER — PATIENT MESSAGE (OUTPATIENT)
Dept: SURGERY | Age: 35
End: 2020-08-25

## 2020-08-25 NOTE — CARE COORDINATION
Patient contacted regarding recent discharge and COVID-19 risk. Discussed COVID-19 related testing which was not done at this time. Test results were not done. Patient informed of results, if available? No     Care Transition Nurse/ Ambulatory Care Manager contacted the patient by telephone to perform post discharge assessment. Verified name and  with patient as identifiers. Patient has following risk factors of: heart failure, diabetes and chronic kidney disease. CTN/ACM reviewed discharge instructions, medical action plan and red flags related to discharge diagnosis. Reviewed and educated them on any new and changed medications related to discharge diagnosis. Advised obtaining a 90-day supply of all daily and as-needed medications. Education provided regarding infection prevention, and signs and symptoms of COVID-19 and when to seek medical attention with patient who verbalized understanding. Discussed exposure protocols and quarantine from [address] you at higher risk for severe illness  and given an opportunity for questions and concerns. The patient agrees to contact the COVID-19 hotline [phone] or PCP office for questions related to their healthcare. CTN/ACM provided contact information for future reference. From CDC: Are you at higher risk for severe illness?  Wash your hands often.  Avoid close contact (6 feet, which is about two arm lengths) with people who are sick.  Put distance between yourself and other people if COVID-19 is spreading in your community.  Clean and disinfect frequently touched surfaces.  Avoid all cruise travel and non-essential air travel.  Call your healthcare professional if you have concerns about COVID-19 and your underlying condition or if you are sick.     For more information on steps you can take to protect yourself, see CDC's How to Protect Yourself    Pt will be further monitored by COVID Loop Team based on severity of symptoms and risk factors. Loop jeff program explained to patient. Patient Agrees and note routed to [address] [place] [address] to enroll  Email Address patient wants to use:  [email or web address]  Phone Number of patient:  [phone]     Patient reports she is getting ready to go to the [facility] for an appointment. She has scheduled HD for tomorrow at 1:45p and says she will be calling to reschedule her appt with gen surg. Pt says she is feeling better today, but that the abscess site is still bleeding. ACM instructed her not to remove the packing, but to reinforce the dressing with gauze/ABD pad at this time and contact her PCP for f/u. Pt verbalized understanding. Pt says the pharmacy was closed last night but she will be dropping her the script for her antibiotic today. Pt questioning if she can shower. ACM suggested she sponge bathe/wash upper body at this time until her abscess and dressings are further evaluated. Pt denies further questions/concerns at this time. Covid risk, red flags, precautions reviewed, understanding verbalized. LOOP program explained, pt agreeable to utilize.

## 2020-08-25 NOTE — TELEPHONE ENCOUNTER
MA called patient to reschedule appointment. Patient rescheduled appointment for 9/9/2020 @ 12:30pm with Dr Gabriela Mercado in [facility]. Patient informed of location and what to bring to appointment. Patient verbalized confirmation and understanding.      Electronically signed by Zeina Benoit on 8/25/20 at 3:04 PM EDT

## 2020-08-25 NOTE — TELEPHONE ENCOUNTER
From: Dalia Graf  To: Victoria Cui MD  Sent: 8/25/2020 1:08 PM EDT  Subject: Visit Follow-Up Question    Good afternoon Dr Dash Persons suppose to be in your office tomorrow but I was in the hospital yesterday and since I haven't had dialysis since Saturday the home dialysis nurse and my kidney Dr schedule me to go into center for a treatment so I won't be able to make it my appointment with you @ 2pm so I needed to let you know and see what you wanted me to do besides reschedule it it's a very important appointment but I really need to go in and have a treatment done as well just let me know what you may want me to do and we can go from there have a blessed day

## 2020-09-25 ENCOUNTER — PATIENT MESSAGE (OUTPATIENT)
Dept: FAMILY MEDICINE CLINIC | Age: 35
End: 2020-09-25

## 2020-09-25 RX ORDER — BLOOD SUGAR DIAGNOSTIC
1 STRIP MISCELLANEOUS DAILY
Qty: 100 EACH | Refills: 3 | Status: SHIPPED
Start: 2020-09-25 | End: 2021-01-27

## 2020-09-25 NOTE — TELEPHONE ENCOUNTER
Last Appointment:  8/11/2020  Future Appointments   Date Time Provider Titus Potter   9/30/2020  1:30 PM Victoria Cui MD [facility] Surgical [facility]

## 2021-01-27 ENCOUNTER — APPOINTMENT (OUTPATIENT)
Dept: ULTRASOUND IMAGING | Age: 36
DRG: 570 | End: 2021-01-27
Payer: MEDICARE

## 2021-01-27 ENCOUNTER — HOSPITAL ENCOUNTER (INPATIENT)
Age: 36
LOS: 6 days | Discharge: HOME HEALTH | DRG: 570 | End: 2021-02-02
Attending: EMERGENCY MEDICINE | Admitting: FAMILY MEDICINE
Payer: MEDICARE

## 2021-01-27 ENCOUNTER — APPOINTMENT (OUTPATIENT)
Dept: GENERAL RADIOLOGY | Age: 36
DRG: 570 | End: 2021-01-27
Payer: MEDICARE

## 2021-01-27 ENCOUNTER — APPOINTMENT (OUTPATIENT)
Dept: CT IMAGING | Age: 36
DRG: 570 | End: 2021-01-27
Payer: MEDICARE

## 2021-01-27 DIAGNOSIS — L02.91 ABSCESS: ICD-10-CM

## 2021-01-27 DIAGNOSIS — E87.5 ACUTE HYPERKALEMIA: Primary | ICD-10-CM

## 2021-01-27 DIAGNOSIS — E53.9 VITAMIN B DEFICIENCY, UNSPECIFIED: ICD-10-CM

## 2021-01-27 DIAGNOSIS — N18.6 ESRD (END STAGE RENAL DISEASE) (HCC): ICD-10-CM

## 2021-01-27 DIAGNOSIS — J81.0 ACUTE PULMONARY EDEMA (HCC): ICD-10-CM

## 2021-01-27 PROBLEM — K80.20 GALL STONE: Status: ACTIVE | Noted: 2021-01-27

## 2021-01-27 PROBLEM — R41.82 AMS (ALTERED MENTAL STATUS): Status: ACTIVE | Noted: 2021-01-27

## 2021-01-27 PROBLEM — R79.89 ELEVATED TROPONIN: Status: ACTIVE | Noted: 2021-01-27

## 2021-01-27 PROBLEM — R77.8 ELEVATED TROPONIN: Status: ACTIVE | Noted: 2021-01-27

## 2021-01-27 PROBLEM — Z99.2 ESRD NEEDING DIALYSIS (HCC): Status: ACTIVE | Noted: 2021-01-27

## 2021-01-27 PROBLEM — Z91.199 NON-COMPLIANCE: Status: ACTIVE | Noted: 2021-01-27

## 2021-01-27 PROBLEM — E87.70 VOLUME OVERLOAD: Status: ACTIVE | Noted: 2021-01-27

## 2021-01-27 PROBLEM — E10.65 UNCONTROLLED TYPE 1 DIABETES MELLITUS WITH HYPERGLYCEMIA (HCC): Status: ACTIVE | Noted: 2018-05-07

## 2021-01-27 LAB
ALBUMIN SERPL-MCNC: 3.5 G/DL (ref 3.5–5.2)
ALP BLD-CCNC: 99 U/L (ref 35–104)
ALT SERPL-CCNC: 33 U/L (ref 0–32)
ANION GAP SERPL CALCULATED.3IONS-SCNC: 16 MMOL/L (ref 7–16)
ANION GAP SERPL CALCULATED.3IONS-SCNC: 19 MMOL/L (ref 7–16)
AST SERPL-CCNC: 18 U/L (ref 0–31)
BASOPHILS ABSOLUTE: 0.03 E9/L (ref 0–0.2)
BASOPHILS RELATIVE PERCENT: 0.4 % (ref 0–2)
BILIRUB SERPL-MCNC: 0.3 MG/DL (ref 0–1.2)
BUN BLDV-MCNC: 54 MG/DL (ref 6–20)
BUN BLDV-MCNC: 54 MG/DL (ref 6–20)
CALCIUM SERPL-MCNC: 9 MG/DL (ref 8.6–10.2)
CALCIUM SERPL-MCNC: 9.2 MG/DL (ref 8.6–10.2)
CHLORIDE BLD-SCNC: 93 MMOL/L (ref 98–107)
CHLORIDE BLD-SCNC: 96 MMOL/L (ref 98–107)
CO2: 21 MMOL/L (ref 22–29)
CO2: 23 MMOL/L (ref 22–29)
CREAT SERPL-MCNC: 14.9 MG/DL (ref 0.5–1)
CREAT SERPL-MCNC: 15.1 MG/DL (ref 0.5–1)
EOSINOPHILS ABSOLUTE: 0.32 E9/L (ref 0.05–0.5)
EOSINOPHILS RELATIVE PERCENT: 4.3 % (ref 0–6)
FOLATE: 19.4 NG/ML (ref 4.8–24.2)
GFR AFRICAN AMERICAN: 3
GFR AFRICAN AMERICAN: 3
GFR NON-AFRICAN AMERICAN: 3 ML/MIN/1.73
GFR NON-AFRICAN AMERICAN: 3 ML/MIN/1.73
GLUCOSE BLD-MCNC: 383 MG/DL (ref 74–99)
GLUCOSE BLD-MCNC: 475 MG/DL (ref 74–99)
HCT VFR BLD CALC: 24.1 % (ref 34–48)
HEMOGLOBIN: 7.9 G/DL (ref 11.5–15.5)
IMMATURE GRANULOCYTES #: 0.05 E9/L
IMMATURE GRANULOCYTES %: 0.7 % (ref 0–5)
LACTIC ACID, SEPSIS: 1.1 MMOL/L (ref 0.5–1.9)
LACTIC ACID, SEPSIS: 1.9 MMOL/L (ref 0.5–1.9)
LYMPHOCYTES ABSOLUTE: 1.09 E9/L (ref 1.5–4)
LYMPHOCYTES RELATIVE PERCENT: 14.6 % (ref 20–42)
MCH RBC QN AUTO: 32.1 PG (ref 26–35)
MCHC RBC AUTO-ENTMCNC: 32.8 % (ref 32–34.5)
MCV RBC AUTO: 98 FL (ref 80–99.9)
METER GLUCOSE: 360 MG/DL (ref 74–99)
MONOCYTES ABSOLUTE: 0.46 E9/L (ref 0.1–0.95)
MONOCYTES RELATIVE PERCENT: 6.1 % (ref 2–12)
NEUTROPHILS ABSOLUTE: 5.53 E9/L (ref 1.8–7.3)
NEUTROPHILS RELATIVE PERCENT: 73.9 % (ref 43–80)
PDW BLD-RTO: 17.2 FL (ref 11.5–15)
PLATELET # BLD: 247 E9/L (ref 130–450)
PMV BLD AUTO: 10.1 FL (ref 7–12)
POTASSIUM REFLEX MAGNESIUM: 5.9 MMOL/L (ref 3.5–5)
POTASSIUM REFLEX MAGNESIUM: 6 MMOL/L (ref 3.5–5)
PRO-BNP: ABNORMAL PG/ML (ref 0–125)
RBC # BLD: 2.46 E12/L (ref 3.5–5.5)
SARS-COV-2, NAAT: NOT DETECTED
SODIUM BLD-SCNC: 132 MMOL/L (ref 132–146)
SODIUM BLD-SCNC: 136 MMOL/L (ref 132–146)
TOTAL PROTEIN: 6.8 G/DL (ref 6.4–8.3)
TROPONIN: 0.13 NG/ML (ref 0–0.03)
VITAMIN B-12: 784 PG/ML (ref 211–946)
WBC # BLD: 7.5 E9/L (ref 4.5–11.5)

## 2021-01-27 PROCEDURE — 85025 COMPLETE CBC W/AUTO DIFF WBC: CPT

## 2021-01-27 PROCEDURE — 82607 VITAMIN B-12: CPT

## 2021-01-27 PROCEDURE — 87070 CULTURE OTHR SPECIMN AEROBIC: CPT

## 2021-01-27 PROCEDURE — 82746 ASSAY OF FOLIC ACID SERUM: CPT

## 2021-01-27 PROCEDURE — 2580000003 HC RX 258: Performed by: FAMILY MEDICINE

## 2021-01-27 PROCEDURE — 82962 GLUCOSE BLOOD TEST: CPT

## 2021-01-27 PROCEDURE — 6360000002 HC RX W HCPCS: Performed by: EMERGENCY MEDICINE

## 2021-01-27 PROCEDURE — 80053 COMPREHEN METABOLIC PANEL: CPT

## 2021-01-27 PROCEDURE — 83540 ASSAY OF IRON: CPT

## 2021-01-27 PROCEDURE — 2500000003 HC RX 250 WO HCPCS: Performed by: EMERGENCY MEDICINE

## 2021-01-27 PROCEDURE — 84484 ASSAY OF TROPONIN QUANT: CPT

## 2021-01-27 PROCEDURE — 96365 THER/PROPH/DIAG IV INF INIT: CPT

## 2021-01-27 PROCEDURE — 99284 EMERGENCY DEPT VISIT MOD MDM: CPT

## 2021-01-27 PROCEDURE — 84439 ASSAY OF FREE THYROXINE: CPT

## 2021-01-27 PROCEDURE — 83036 HEMOGLOBIN GLYCOSYLATED A1C: CPT

## 2021-01-27 PROCEDURE — 2580000003 HC RX 258: Performed by: EMERGENCY MEDICINE

## 2021-01-27 PROCEDURE — 2700000000 HC OXYGEN THERAPY PER DAY

## 2021-01-27 PROCEDURE — 6370000000 HC RX 637 (ALT 250 FOR IP): Performed by: FAMILY MEDICINE

## 2021-01-27 PROCEDURE — 2060000000 HC ICU INTERMEDIATE R&B

## 2021-01-27 PROCEDURE — 87040 BLOOD CULTURE FOR BACTERIA: CPT

## 2021-01-27 PROCEDURE — 6370000000 HC RX 637 (ALT 250 FOR IP): Performed by: EMERGENCY MEDICINE

## 2021-01-27 PROCEDURE — 80048 BASIC METABOLIC PNL TOTAL CA: CPT

## 2021-01-27 PROCEDURE — 83550 IRON BINDING TEST: CPT

## 2021-01-27 PROCEDURE — 93970 EXTREMITY STUDY: CPT

## 2021-01-27 PROCEDURE — 87205 SMEAR GRAM STAIN: CPT

## 2021-01-27 PROCEDURE — 74177 CT ABD & PELVIS W/CONTRAST: CPT

## 2021-01-27 PROCEDURE — 83880 ASSAY OF NATRIURETIC PEPTIDE: CPT

## 2021-01-27 PROCEDURE — 83605 ASSAY OF LACTIC ACID: CPT

## 2021-01-27 PROCEDURE — 6360000004 HC RX CONTRAST MEDICATION: Performed by: RADIOLOGY

## 2021-01-27 PROCEDURE — 71045 X-RAY EXAM CHEST 1 VIEW: CPT

## 2021-01-27 PROCEDURE — 93005 ELECTROCARDIOGRAM TRACING: CPT | Performed by: EMERGENCY MEDICINE

## 2021-01-27 PROCEDURE — 36415 COLL VENOUS BLD VENIPUNCTURE: CPT

## 2021-01-27 PROCEDURE — U0002 COVID-19 LAB TEST NON-CDC: HCPCS

## 2021-01-27 PROCEDURE — 2580000003 HC RX 258: Performed by: RADIOLOGY

## 2021-01-27 PROCEDURE — 93970 EXTREMITY STUDY: CPT | Performed by: RADIOLOGY

## 2021-01-27 PROCEDURE — 96375 TX/PRO/DX INJ NEW DRUG ADDON: CPT

## 2021-01-27 PROCEDURE — 84443 ASSAY THYROID STIM HORMONE: CPT

## 2021-01-27 RX ORDER — SODIUM CHLORIDE 0.9 % (FLUSH) 0.9 %
10 SYRINGE (ML) INJECTION PRN
Status: DISCONTINUED | OUTPATIENT
Start: 2021-01-27 | End: 2021-02-02 | Stop reason: HOSPADM

## 2021-01-27 RX ORDER — ACETAMINOPHEN 650 MG/1
650 SUPPOSITORY RECTAL EVERY 6 HOURS PRN
Status: DISCONTINUED | OUTPATIENT
Start: 2021-01-27 | End: 2021-02-02 | Stop reason: HOSPADM

## 2021-01-27 RX ORDER — HEPARIN SODIUM 10000 [USP'U]/ML
5000 INJECTION, SOLUTION INTRAVENOUS; SUBCUTANEOUS EVERY 8 HOURS
Status: DISCONTINUED | OUTPATIENT
Start: 2021-01-28 | End: 2021-02-02 | Stop reason: HOSPADM

## 2021-01-27 RX ORDER — CHOLECALCIFEROL (VITAMIN D3) 50 MCG
2000 TABLET ORAL DAILY
Status: DISCONTINUED | OUTPATIENT
Start: 2021-01-27 | End: 2021-02-02 | Stop reason: HOSPADM

## 2021-01-27 RX ORDER — ALBUTEROL SULFATE 2.5 MG/3ML
2.5 SOLUTION RESPIRATORY (INHALATION) EVERY 4 HOURS PRN
Status: DISCONTINUED | OUTPATIENT
Start: 2021-01-27 | End: 2021-02-02 | Stop reason: HOSPADM

## 2021-01-27 RX ORDER — ACETAMINOPHEN 160 MG
2000 TABLET,DISINTEGRATING ORAL DAILY
COMMUNITY
End: 2021-09-10 | Stop reason: SDUPTHER

## 2021-01-27 RX ORDER — SODIUM CHLORIDE 0.9 % (FLUSH) 0.9 %
10 SYRINGE (ML) INJECTION ONCE
Status: COMPLETED | OUTPATIENT
Start: 2021-01-27 | End: 2021-01-27

## 2021-01-27 RX ORDER — INSULIN GLARGINE 100 [IU]/ML
16 INJECTION, SOLUTION SUBCUTANEOUS NIGHTLY
Status: DISCONTINUED | OUTPATIENT
Start: 2021-01-27 | End: 2021-01-30

## 2021-01-27 RX ORDER — INSULIN LISPRO 100 [IU]/ML
10 INJECTION, SOLUTION INTRAVENOUS; SUBCUTANEOUS
COMMUNITY

## 2021-01-27 RX ORDER — INSULIN GLARGINE 300 U/ML
16 INJECTION, SOLUTION SUBCUTANEOUS NIGHTLY
COMMUNITY

## 2021-01-27 RX ORDER — FAMOTIDINE 20 MG/1
20 TABLET, FILM COATED ORAL EVERY MORNING
Status: DISCONTINUED | OUTPATIENT
Start: 2021-01-28 | End: 2021-02-02 | Stop reason: HOSPADM

## 2021-01-27 RX ORDER — DEXTROSE MONOHYDRATE 25 G/50ML
12.5 INJECTION, SOLUTION INTRAVENOUS PRN
Status: DISCONTINUED | OUTPATIENT
Start: 2021-01-27 | End: 2021-02-02 | Stop reason: HOSPADM

## 2021-01-27 RX ORDER — ONDANSETRON 2 MG/ML
4 INJECTION INTRAMUSCULAR; INTRAVENOUS ONCE
Status: COMPLETED | OUTPATIENT
Start: 2021-01-27 | End: 2021-01-27

## 2021-01-27 RX ORDER — ONDANSETRON 2 MG/ML
4 INJECTION INTRAMUSCULAR; INTRAVENOUS EVERY 6 HOURS PRN
Status: DISCONTINUED | OUTPATIENT
Start: 2021-01-27 | End: 2021-02-02 | Stop reason: HOSPADM

## 2021-01-27 RX ORDER — ACETAMINOPHEN 325 MG/1
650 TABLET ORAL EVERY 6 HOURS PRN
Status: DISCONTINUED | OUTPATIENT
Start: 2021-01-27 | End: 2021-02-02 | Stop reason: HOSPADM

## 2021-01-27 RX ORDER — MORPHINE SULFATE 4 MG/ML
4 INJECTION, SOLUTION INTRAMUSCULAR; INTRAVENOUS ONCE
Status: DISCONTINUED | OUTPATIENT
Start: 2021-01-27 | End: 2021-02-02 | Stop reason: HOSPADM

## 2021-01-27 RX ORDER — CARVEDILOL 25 MG/1
25 TABLET ORAL 2 TIMES DAILY WITH MEALS
Status: DISCONTINUED | OUTPATIENT
Start: 2021-01-28 | End: 2021-02-02 | Stop reason: HOSPADM

## 2021-01-27 RX ORDER — POLYETHYLENE GLYCOL 3350 17 G/17G
17 POWDER, FOR SOLUTION ORAL DAILY PRN
Status: DISCONTINUED | OUTPATIENT
Start: 2021-01-27 | End: 2021-02-02 | Stop reason: HOSPADM

## 2021-01-27 RX ORDER — HEPARIN SODIUM 10000 [USP'U]/ML
5000 INJECTION, SOLUTION INTRAVENOUS; SUBCUTANEOUS EVERY 8 HOURS
Status: DISCONTINUED | OUTPATIENT
Start: 2021-01-27 | End: 2021-01-27

## 2021-01-27 RX ORDER — DEXTROSE MONOHYDRATE 50 MG/ML
100 INJECTION, SOLUTION INTRAVENOUS PRN
Status: DISCONTINUED | OUTPATIENT
Start: 2021-01-27 | End: 2021-02-02 | Stop reason: HOSPADM

## 2021-01-27 RX ORDER — DEXTROSE MONOHYDRATE 25 G/50ML
12.5 INJECTION, SOLUTION INTRAVENOUS PRN
Status: DISCONTINUED | OUTPATIENT
Start: 2021-01-27 | End: 2021-01-27

## 2021-01-27 RX ORDER — NICOTINE POLACRILEX 4 MG
15 LOZENGE BUCCAL PRN
Status: DISCONTINUED | OUTPATIENT
Start: 2021-01-27 | End: 2021-02-02 | Stop reason: HOSPADM

## 2021-01-27 RX ORDER — SODIUM POLYSTYRENE SULFONATE 15 G/60ML
15 SUSPENSION ORAL; RECTAL ONCE
Status: COMPLETED | OUTPATIENT
Start: 2021-01-27 | End: 2021-01-27

## 2021-01-27 RX ORDER — DEXTROSE MONOHYDRATE 50 MG/ML
100 INJECTION, SOLUTION INTRAVENOUS PRN
Status: DISCONTINUED | OUTPATIENT
Start: 2021-01-27 | End: 2021-01-27

## 2021-01-27 RX ORDER — CHOLECALCIFEROL (VITAMIN D3) 10 MCG
1 TABLET ORAL
Status: DISCONTINUED | OUTPATIENT
Start: 2021-01-27 | End: 2021-02-02 | Stop reason: HOSPADM

## 2021-01-27 RX ORDER — NICOTINE POLACRILEX 4 MG
15 LOZENGE BUCCAL PRN
Status: DISCONTINUED | OUTPATIENT
Start: 2021-01-27 | End: 2021-01-27

## 2021-01-27 RX ORDER — PROMETHAZINE HYDROCHLORIDE 25 MG/1
12.5 TABLET ORAL EVERY 6 HOURS PRN
Status: DISCONTINUED | OUTPATIENT
Start: 2021-01-27 | End: 2021-02-02 | Stop reason: HOSPADM

## 2021-01-27 RX ORDER — SODIUM CHLORIDE 0.9 % (FLUSH) 0.9 %
10 SYRINGE (ML) INJECTION EVERY 12 HOURS SCHEDULED
Status: DISCONTINUED | OUTPATIENT
Start: 2021-01-27 | End: 2021-02-02 | Stop reason: HOSPADM

## 2021-01-27 RX ADMIN — INSULIN LISPRO 10 UNITS: 100 INJECTION, SOLUTION INTRAVENOUS; SUBCUTANEOUS at 22:57

## 2021-01-27 RX ADMIN — Medication 10 ML: at 17:11

## 2021-01-27 RX ADMIN — IOPAMIDOL 90 ML: 755 INJECTION, SOLUTION INTRAVENOUS at 17:11

## 2021-01-27 RX ADMIN — SODIUM BICARBONATE 50 MEQ: 84 INJECTION INTRAVENOUS at 18:23

## 2021-01-27 RX ADMIN — Medication 1000 MG: at 18:00

## 2021-01-27 RX ADMIN — ONDANSETRON 4 MG: 2 INJECTION INTRAMUSCULAR; INTRAVENOUS at 18:28

## 2021-01-27 RX ADMIN — PIPERACILLIN AND TAZOBACTAM 3375 MG: 3; .375 INJECTION, POWDER, LYOPHILIZED, FOR SOLUTION INTRAVENOUS at 23:04

## 2021-01-27 RX ADMIN — SODIUM POLYSTYRENE SULFONATE 15 G: 15 SUSPENSION ORAL; RECTAL at 18:23

## 2021-01-27 RX ADMIN — Medication 10 ML: at 23:03

## 2021-01-27 RX ADMIN — INSULIN HUMAN 10 UNITS: 100 INJECTION, SOLUTION PARENTERAL at 18:23

## 2021-01-27 RX ADMIN — INSULIN GLARGINE 16 UNITS: 100 INJECTION, SOLUTION SUBCUTANEOUS at 22:58

## 2021-01-27 ASSESSMENT — ENCOUNTER SYMPTOMS
DIARRHEA: 0
EYE PAIN: 0
WHEEZING: 0
NAUSEA: 0
ABDOMINAL DISTENTION: 1
SHORTNESS OF BREATH: 1
COUGH: 0
ABDOMINAL PAIN: 0
VOMITING: 0
BACK PAIN: 0
SORE THROAT: 0

## 2021-01-27 ASSESSMENT — PAIN DESCRIPTION - LOCATION: LOCATION: LEG

## 2021-01-27 NOTE — ED NOTES
Radiology Procedure Waiver   Name: Ho Rodríguez  : 1985  MRN: [id number]    Date:  21    Time: 4:24 PM EST    Benefits of immediately proceeding with radiology exam(s) without pre-testing outweigh the risks or are not indicated as specified below and therefore the following is/are being waived:    [] Benefits of immediate radiology exam(s) outweigh any risk. OR    Pre-exam testing is not indicated for the following reason(s):  [] Pregnancy test   [] Patients LMP on-time and regular.   [] Patient had Tubal Ligation or has other Contraception Device. [] Patient  is Menopausal or Premenarcheal.    [] Patient had Full or Partial Hysterectomy. [] Protocol for CT contrast allegry   [] Patient has tolerated well previously   [] Patient does not have a true allergy    [] MRI Questionnaire     [x] BUN/Creatinine   [] Patient age w/no hx of renal dysfunction. [x] Patient on Dialysis. [] Recent Normal Labs.   Electronically signed by Poly Jean DO on 21 at 4:24 PM EST               Poly Jean DO  Resident  [phone]

## 2021-01-27 NOTE — ED NOTES
[facility] Emergency   Department of Emergency Medicine ED  Provider Note  ED Room: /    Radiology Procedure Waiver   Name: Janet Styles  : 1985  MRN: [id number]   Date:  21    Time: 3:40 PM EST    Benefits of immediately proceeding with Radiology exam(s) without pre-testing outweigh the risks or are not indicated as specified below and therefore the following is/are being waived:    [x] Pregnancy test:   [x] Patients LMP on-time and regular.   [] Patient had Tubal Ligation or has other Contraception Device. [] Patient  is Menopausal or Premenarcheal.    [] Patient had Full or Partial Hysterectomy. [] Protocol for Iodine allergy    [] MRI Questionnaire    [x] BUN/Creatinine   [] Patient age w/no hx of renal dysfunction. [x] Patient on Dialysis. [] Recent Normal Labs.     Electronically signed by Bonnie Rojas MD    21    3:40 PM EST             Tika Borjas MD  21 [id number]

## 2021-01-27 NOTE — ED NOTES
Bed: 35  Expected date:   Expected time:   Means of arrival:   Comments:  [address] [facility], RN  01/27/21 0673

## 2021-01-27 NOTE — ED PROVIDER NOTES
ATTENDING PROVIDER ATTESTATION:     Mayi Renteria presented to the emergency department for evaluation of Shortness of Breath (no dialysis since last friday) and Wound Check (boil on leg that is draining)   and was initially evaluated by the Medical Resident. See Original ED Note for H&P and ED course above. I have reviewed and discussed the case, including pertinent history (medical, surgical, family and social) and exam findings with the Medical Resident assigned to Mayi Renteria. I have personally performed and/or participated in the history, exam, medical decision making, and procedures and agree with all pertinent clinical information and any additional changes or corrections are noted below in my assessment and plan. I have discussed this patient in detail with the resident, and provided the instruction and education,       I have reviewed my findings and recommendations with the assigned Medical Resident, Mayi Renteria and members of family present at the time of disposition. I have performed a history and physical examination of this patient and directly supervised the resident caring for this patient      History of Present Illness:    Presents to the ED for missing dialysis as well as left inner thigh abscess, beginning several days ago. The complaint has been constant, moderate in severity, and worsened by nothing. Reports she normally does dialysis 4 days a week at home. Reports she has not had dialysis since last Friday. Reports she is well above her dry weight and is fluid overloaded. She also reports she has a left thigh abscess that is already draining. No fever or chills. No chest pain. +shortness of breath. She denies other complaints.         Review of Systems:   A complete review of systems was performed and pertinent positives and negatives are stated within HPI, all other systems reviewed and are negative.    --------------------------------------------- PAST HISTORY ---------------------------------------------  Past Medical History:  has a past medical history of JOLLY (acute kidney injury) ([facility] AVF (arteriovenous fistula) ([facility] Chronic kidney disease, Dialysis AV fistula malfunction, initial encounter ([facility] DM type 1 (diabetes mellitus, type 1) ([facility] Encounter regarding vascular access for dialysis for ESRD ([facility] ESRD (end stage renal disease) ([facility] Hemodialysis patient ([facility] Hypertension, and Tobacco abuse. Past Surgical History:  has a past surgical history that includes Dilation and curettage of uterus (); cyst incision and drainage (2011); other surgical history (/); fracture surgery (); other surgical history (2016); other surgical history (2016); other surgical history (Left, 2017);  section (); pr insert non-tunnel cv cath (N/A, 5/10/2018); pr anastomosis,av,any site (Left, 2018); pr vitrectomy pars plana remove preretinal membrane (Left, 2018); and Dialysis fistula creation (Left, 2019). Social History:  reports that she has been smoking cigarettes. She has smoked for the past 15.00 years. She has never used smokeless tobacco. She reports previous alcohol use. She reports that she does not use drugs. Family History: family history includes Cancer in her father; Diabetes in her paternal aunt; Stroke in her mother. Unless otherwise noted, family history is non contributory    The patients home medications have been reviewed. Allergies: Patient has no known allergies. Physical Exam:  Constitutional/General: Alert and oriented x3  Head: Normocephalic and atraumatic  Eyes: PERRL, EOMI, sclera non icteric  Mouth: Oropharynx clear, handling secretions  Neck: Supple, full ROM, no stridor, no meningeal signs  Respiratory: Lungs with rales bilaterally.  Not in respiratory distress  Cardiovascular:  Regular rate. Regular rhythm. No murmurs, no aortic murmurs, no gallops, no rubs. 2+ distal pulses. Equal extremity pulses. GI:  Abdomen Soft, Non tender, Non distended. No rebound, guarding, or rigidity. No pulsatile masses. Musculoskeletal: Moves all extremities x 4. Warm and well perfused,  no clubbing, no cyanosis, no edema. Palpable peripheral pulses  Integument: skin warm and dry. Left inner thigh/groin abscess with fluctuance and purulent drainage. There is a large indurated area the feels deep to the thigh and extends to the groin crease. No crepitus or necrosis. Neurologic: GCS 15, no focal deficits  Psychiatric: Normal Affect      I directly supervised any procedures performed by the resident and was present for the procedure including all critical portions of the procedure      The cardiac monitor revealed sinus rhythm with a heart rate in the 80s as interpreted by me. The cardiac monitor was ordered secondary to the patient's shortness of breath and missing diaysis and to monitor the patient for dysrhythmia. CPT [id number]      I, Dr. Kenyon , am the primary provider of record    My Medical Decision Making:         Life threatening hyperkalemia, treated medically to prevent life threatening deterioration while arranging emergent dialysis. Also with groin abscess, concerns that it tracks deep based on induration and location. CT to further evaluation and surgery consult as well. Empiric antibiotics given. Medicine consulted for admission. CRITICAL CARE:  Please note that the withdrawal or failure to initiate urgent interventions for this patient would likely result in a life threatening deterioration or permanent disability. Accordingly this patient received 30 minutes of critical care time, including coordination of care, and direct bedside care and excluding separately billable procedures. 1. Acute hyperkalemia    2. Acute pulmonary edema (HCC)    3.  Abscess           [address], MD  01/27/21 6037

## 2021-01-28 LAB
EKG ATRIAL RATE: 88 BPM
EKG P AXIS: 52 DEGREES
EKG P-R INTERVAL: 130 MS
EKG Q-T INTERVAL: 354 MS
EKG QRS DURATION: 66 MS
EKG QTC CALCULATION (BAZETT): 428 MS
EKG R AXIS: 27 DEGREES
EKG T AXIS: 65 DEGREES
EKG VENTRICULAR RATE: 88 BPM
HBA1C MFR BLD: 9.1 % (ref 4–5.6)
IRON SATURATION: 29 % (ref 15–50)
IRON: 50 MCG/DL (ref 37–145)
METER GLUCOSE: 120 MG/DL (ref 74–99)
METER GLUCOSE: 146 MG/DL (ref 74–99)
METER GLUCOSE: 174 MG/DL (ref 74–99)
METER GLUCOSE: 54 MG/DL (ref 74–99)
T4 FREE: 1.1 NG/DL (ref 0.93–1.7)
TOTAL IRON BINDING CAPACITY: 173 MCG/DL (ref 250–450)
TSH SERPL DL<=0.05 MIU/L-ACNC: 3.85 UIU/ML (ref 0.27–4.2)

## 2021-01-28 PROCEDURE — 2700000000 HC OXYGEN THERAPY PER DAY

## 2021-01-28 PROCEDURE — 2060000000 HC ICU INTERMEDIATE R&B

## 2021-01-28 PROCEDURE — 90935 HEMODIALYSIS ONE EVALUATION: CPT | Performed by: INTERNAL MEDICINE

## 2021-01-28 PROCEDURE — 6360000002 HC RX W HCPCS: Performed by: FAMILY MEDICINE

## 2021-01-28 PROCEDURE — 2500000003 HC RX 250 WO HCPCS: Performed by: FAMILY MEDICINE

## 2021-01-28 PROCEDURE — 6370000000 HC RX 637 (ALT 250 FOR IP): Performed by: FAMILY MEDICINE

## 2021-01-28 PROCEDURE — 82962 GLUCOSE BLOOD TEST: CPT

## 2021-01-28 PROCEDURE — 2580000003 HC RX 258: Performed by: FAMILY MEDICINE

## 2021-01-28 PROCEDURE — 93010 ELECTROCARDIOGRAM REPORT: CPT | Performed by: INTERNAL MEDICINE

## 2021-01-28 PROCEDURE — 5A1D70Z PERFORMANCE OF URINARY FILTRATION, INTERMITTENT, LESS THAN 6 HOURS PER DAY: ICD-10-PCS | Performed by: INTERNAL MEDICINE

## 2021-01-28 RX ORDER — HYDRALAZINE HYDROCHLORIDE 20 MG/ML
10 INJECTION INTRAMUSCULAR; INTRAVENOUS EVERY 6 HOURS PRN
Status: DISCONTINUED | OUTPATIENT
Start: 2021-01-28 | End: 2021-02-02 | Stop reason: HOSPADM

## 2021-01-28 RX ORDER — BUMETANIDE 0.25 MG/ML
1 INJECTION, SOLUTION INTRAMUSCULAR; INTRAVENOUS EVERY 12 HOURS
Status: DISCONTINUED | OUTPATIENT
Start: 2021-01-28 | End: 2021-02-02 | Stop reason: HOSPADM

## 2021-01-28 RX ADMIN — INSULIN GLARGINE 16 UNITS: 100 INJECTION, SOLUTION SUBCUTANEOUS at 23:03

## 2021-01-28 RX ADMIN — INSULIN LISPRO 2 UNITS: 100 INJECTION, SOLUTION INTRAVENOUS; SUBCUTANEOUS at 17:18

## 2021-01-28 RX ADMIN — PIPERACILLIN AND TAZOBACTAM 3375 MG: 3; .375 INJECTION, POWDER, LYOPHILIZED, FOR SOLUTION INTRAVENOUS at 12:26

## 2021-01-28 RX ADMIN — PIPERACILLIN AND TAZOBACTAM 3375 MG: 3; .375 INJECTION, POWDER, LYOPHILIZED, FOR SOLUTION INTRAVENOUS at 21:24

## 2021-01-28 RX ADMIN — FAMOTIDINE 20 MG: 20 TABLET ORAL at 12:04

## 2021-01-28 RX ADMIN — BUMETANIDE 1 MG: 0.25 INJECTION INTRAMUSCULAR; INTRAVENOUS at 06:11

## 2021-01-28 RX ADMIN — CARVEDILOL 25 MG: 25 TABLET, FILM COATED ORAL at 21:35

## 2021-01-28 RX ADMIN — CARVEDILOL 25 MG: 25 TABLET, FILM COATED ORAL at 12:04

## 2021-01-28 RX ADMIN — VANCOMYCIN HYDROCHLORIDE 1250 MG: 10 INJECTION, POWDER, LYOPHILIZED, FOR SOLUTION INTRAVENOUS at 03:27

## 2021-01-28 RX ADMIN — NEPHROCAP 1 MG: 1 CAP ORAL at 17:17

## 2021-01-28 RX ADMIN — INSULIN LISPRO 10 UNITS: 100 INJECTION, SOLUTION INTRAVENOUS; SUBCUTANEOUS at 17:20

## 2021-01-28 RX ADMIN — HEPARIN SODIUM 5000 UNITS: 10000 INJECTION INTRAVENOUS; SUBCUTANEOUS at 12:04

## 2021-01-28 RX ADMIN — Medication 10 ML: at 22:09

## 2021-01-28 RX ADMIN — Medication 2000 UNITS: at 12:04

## 2021-01-28 RX ADMIN — VANCOMYCIN HYDROCHLORIDE 1250 MG: 10 INJECTION, POWDER, LYOPHILIZED, FOR SOLUTION INTRAVENOUS at 12:45

## 2021-01-28 RX ADMIN — SODIUM CHLORIDE, PRESERVATIVE FREE 10 ML: 5 INJECTION INTRAVENOUS at 17:18

## 2021-01-28 RX ADMIN — BUMETANIDE 1 MG: 0.25 INJECTION INTRAMUSCULAR; INTRAVENOUS at 17:17

## 2021-01-28 RX ADMIN — INSULIN LISPRO 10 UNITS: 100 INJECTION, SOLUTION INTRAVENOUS; SUBCUTANEOUS at 12:06

## 2021-01-28 RX ADMIN — Medication 10 ML: at 12:10

## 2021-01-28 ASSESSMENT — PAIN SCALES - GENERAL
PAINLEVEL_OUTOF10: 0
PAINLEVEL_OUTOF10: 0

## 2021-01-28 NOTE — FLOWSHEET NOTE
01/28/21 1028   Vital Signs   BP (!) 180/93   Temp 98.9 °F (37.2 °C)   Pulse 100   Resp 18   Weight 165 lb 2 oz (74.9 kg)   Weight Method Bed scale   Percent Weight Change -4.83   Pain Assessment   Pain Assessment 0-10   Pain Level 0   Post-Hemodialysis Assessment   Post-Treatment Procedures Blood returned; Access bleeding time < 10 minutes   Machine Disinfection Process Exterior Machine Disinfection   Rinseback Volume (ml) 300 ml   Total Liters Processed (l/min) 84.7 l/min   Dialyzer Clearance Moderately streaked   Duration of Treatment (minutes) 240 minutes   Heparin amount administered during treatment (units) 0 units   Hemodialysis Intake (ml) 300 ml   Hemodialysis Output (ml) 4200 ml   NET Removed (ml) 3900 ml   Tolerated Treatment Good   Patient Response to Treatment tolerated well, profile b, refill noted, 3900cc fluid removal   Bilateral Breath Sounds Diminished   Edema Generalized   Physician Notified?  No

## 2021-01-28 NOTE — CONSULTS
Procedure Laterality Date     SECTION      CYST INCISION AND DRAINAGE  2011    perirectal abscess. [facility]. Dr. Donovan Casas Left 2019    LEFT ARM FISTULAGRAM, BALLOON ANGIOPLASTY performed by Vu Reid MD at [address]  2009    FRACTURE SURGERY      fracture right ulnar, left tibia, and pelvis    OTHER SURGICAL HISTORY  /    open reduction internal fixation left radial shaft    OTHER SURGICAL HISTORY  2016    pericardial window    OTHER SURGICAL HISTORY  2016     r tesio insertion  / remove 2018    OTHER SURGICAL HISTORY Left 2017    insertion a-v fistula left arm    ID ANASTOMOSIS,AV,ANY SITE Left 2018    REVISION AV FISTULA - LEFT ARM performed by Vu Reid MD at [facility] NON-TUNNEL CV CATH N/A 5/10/2018    TESIO CATHETER INSERTION performed by Janet Chavez MD at [address] [place] [address] [address] MEMBRANE Left 2018    PARS PLANA VITRECTOMY 25 GAUGE, VITREOUS HEMORRHAGE REMOVAL, ENDO LASER, AIR/FLUID  EXCHANGE LEFT EYE performed by Dedra Piña MD at [facility] OR       Family History   Problem Relation Age of Onset    Stroke Mother     Cancer Father     Diabetes Paternal Aunt         reports that she has been smoking cigarettes. She has smoked for the past 15.00 years. She has never used smokeless tobacco. She reports previous alcohol use. She reports that she does not use drugs. Allergies:  Patient has no known allergies.     Current Medications:        bumetanide (BUMEX) injection 1 mg, Q12H      vancomycin (VANCOCIN) intermittent dosing (placeholder), RX Placeholder      morphine sulfate (PF) injection 4 mg, Once      glucose (GLUTOSE) 40 % oral gel 15 g, PRN      dextrose 50 % IV solution, PRN      glucagon (rDNA) injection 1 mg, PRN      dextrose 5 % solution, PRN      albuterol (PROVENTIL) [id number]    transfused   08/21/16  07:02  SCC    RBC  08/19/2016      RBC           [id number]    released     08/23/16  00:54  SCC    HGB 7.9 01/27/2021    HCT 24.1 01/27/2021    MCV 98.0 01/27/2021    MCH 32.1 01/27/2021    MCHC 32.8 01/27/2021    RDW 17.2 01/27/2021     01/27/2021    MPV 10.1 01/27/2021     CBC with Differential:    Lab Results   Component Value Date    WBC 7.5 01/27/2021    RBC 2.46 01/27/2021    RBC  08/19/2016      RBC           [id number]    transfused   08/21/16  07:02  SCC    RBC  08/19/2016      RBC           [id number]    released     08/23/16  00:54  SCC    HGB 7.9 01/27/2021    HCT 24.1 01/27/2021     01/27/2021    MCV 98.0 01/27/2021    MCH 32.1 01/27/2021    MCHC 32.8 01/27/2021    RDW 17.2 01/27/2021    SEGSPCT 53 10/08/2013    LYMPHOPCT 14.6 01/27/2021    MONOPCT 6.1 01/27/2021    BASOPCT 0.4 01/27/2021    MONOSABS 0.46 01/27/2021    LYMPHSABS 1.09 01/27/2021    EOSABS 0.32 01/27/2021    BASOSABS 0.03 01/27/2021     CMP:    Lab Results   Component Value Date     01/27/2021    K 6.0 01/27/2021    CL 96 01/27/2021    CO2 21 01/27/2021    BUN 54 01/27/2021    CREATININE 15.1 01/27/2021    GFRAA 3 01/27/2021    LABGLOM 3 01/27/2021    GLUCOSE 383 01/27/2021    GLUCOSE 279 07/27/2011    PROT 6.8 01/27/2021    LABALBU 3.5 01/27/2021    LABALBU 3.9 01/10/2011    CALCIUM 9.0 01/27/2021    BILITOT 0.3 01/27/2021    ALKPHOS 99 01/27/2021    AST 18 01/27/2021    ALT 33 01/27/2021     BMP:    Lab Results   Component Value Date     01/27/2021    K 6.0 01/27/2021    CL 96 01/27/2021    CO2 21 01/27/2021    BUN 54 01/27/2021    LABALBU 3.5 01/27/2021    LABALBU 3.9 01/10/2011    CREATININE 15.1 01/27/2021    CALCIUM 9.0 01/27/2021    GFRAA 3 01/27/2021    LABGLOM 3 01/27/2021    GLUCOSE 383 01/27/2021    GLUCOSE 279 07/27/2011     Ionized Calcium:    Lab Results   Component Value Date    IONCA 1.39 04/30/2013     Magnesium:    Lab FOLATE 19.4 01/27/2021     IRON:    Lab Results   Component Value Date    IRON 50 01/27/2021     Iron Saturation:  No components found for: PERCENTFE  TIBC:    Lab Results   Component Value Date    TIBC 173 01/27/2021     FERRITIN:    Lab Results   Component Value Date    FERRITIN 841 07/14/2020     RPR:  No results found for: RPR  AMYLASE:    Lab Results   Component Value Date    AMYLASE 83 11/26/2018     LIPASE:    Lab Results   Component Value Date    LIPASE 12 11/26/2018     Urine Toxicology:  No components found for: Bradley Tanya, IBENZO, ICOCAINE, IMARTHC, IOPIATES, IPHENCYC     Imaging:  EXAMINATION:   ONE XRAY VIEW OF THE CHEST   1/27/2021 2:46 pm   COMPARISON:   August 24, 2020   HISTORY:   ORDERING SYSTEM PROVIDED HISTORY: sob, CHF   TECHNOLOGIST PROVIDED HISTORY:   Reason for exam:->sob, CHF   What reading provider will be dictating this exam?->CRC   FINDINGS:   The heart is enlarged.  There are bilateral infiltrates.  The costophrenic   angles are mildly blunted.  No pneumothorax.       Impression   CHF.  Superimposed pneumonia cannot be excluded. EXAMINATION:   CT OF THE ABDOMEN AND PELVIS WITH CONTRAST 1/27/2021 5:13 pm   TECHNIQUE:   CT of the abdomen and pelvis was performed with the administration of   intravenous contrast. Multiplanar reformatted images are provided for review. Dose modulation, iterative reconstruction, and/or weight based adjustment of   the mA/kV was utilized to reduce the radiation dose to as low as reasonably   achievable. COMPARISON:   None.    HISTORY:   ORDERING SYSTEM PROVIDED HISTORY: left groin abscess, r/o gas,   TECHNOLOGIST PROVIDED HISTORY:   Additional Contrast?->None   Reason for exam:->left groin abscess, r/o gas,   What reading provider will be dictating this exam?->CRC   FINDINGS:   Lower Chest: Increased interstitial markings and chronic parenchymal changes   at the lung bases.  Small right pleural effusion.  Enlarged subcarinal lymph   node measuring 2.7 x 2.4 cm noted. Organs: No focal liver lesions.  Gallstone versus sludge noted in the   gallbladder.  Mild pericholecystic fluid collection.  Spleen is not enlarged. Pancreas shows normal contour.  Adrenal glands appear unremarkable.  The   kidneys are atrophic bilaterally. GI/Bowel: No obstructing or constricting mass lesions. Pelvis: Incompletely distended bladder with thickening of the bladder wall   could be due to pseudo thickening or cystitis.  Mild-to-moderate ascites. Peritoneum/Retroperitoneum: Ascites.  No free air or significant adenopathy. Bones/Soft Tissues: Diffuse subcutaneous edema.  Orthopedic hardware through   the sacrum and SI joints.       Impression   Mild to moderate ascites and diffuse subcutaneous edema. Severely atrophic kidneys. Gallstones/gallbladder sludge. Parenchymal changes and interstitial infiltrates in the visualized lung bases   with a small right pleural effusion.  Partially visualized enlarged   subcarinal lymph node.  Consider chest CT for complete evaluation of the   chest.       Patient MRN:  [id number]   : 1985   Age: 38 years   Gender: Female   Order Date:  2021   EXAM: US DUP LOWER EXTREMITIES BILATERAL VENOUS   NUMBER OF IMAGES:  40   INDICATION:  DVT    DVT   COMPARISON: None   Within the visualized vessels, there is no evidence for deep venous   thrombosis   There is good compressibility, there is good augmentation, there is   good color flow.        Impression   Within the visualized vessels there is no evidence for deep venous   thrombosis         Assessment & Plan:  1. ESRD-dependent on IHD via Home IHD on 4 days per week with 3 hour treatments using the LUE AVF   Has been noncompliant with treatments-most recent IHD was Friday, . Will follow MWF schedule while inpatient. IHD today.     2. Htn with CKD 5/ESRD  Above goal <140/90  Volume removal on IHD  Continue home meds: amlodipine 5 mg and carvedilol 25 mg bid  Follow    3. DM type I, uncontrolled as evidenced by HgbA1c 9.1   Blood sugar in   Primary following    4. Anemia in CKD  Hgb below goal >10  Transfuse < 7.0  Check iron studies  Check stool for OB  Start VALENTIN    5.  Sec HPTH of renal origin  PO4 5.8 on 12/3/2020  Check po4        Thank you Dr. Desiree Barrow MD for allowing us to participate in care of Lisa KEMI Barnes-CNP  9:33 AM  1/28/2021     Pt seen and examined agree with above  Hd mwf  For I and d of abscess  Haroon Rack

## 2021-01-28 NOTE — PROGRESS NOTES
Patient admitted last evening. Patient is alert and pleasant. PIV in the RUE, fistula in left arm and boil inleft groin. Patient will start dialysis this AM. Started on zosyn and vanco. Admission charting complete. Patient went to HD at 0600.  Continue to monitor

## 2021-01-28 NOTE — PROGRESS NOTES
Per night shift RN, Vancomycin was not given. I notified Pharmacy and will notify them when the patient arrives back to unit from dialysis.

## 2021-01-28 NOTE — PROGRESS NOTES
Hospitalist Progress Note      SYNOPSIS: Patient admitted on 2021   Patient with end-stage renal disease on peritoneal dialysis. Missed dialysis on several days. Generalized edema. Shortness of breath and cough. Has an abscess in the groin area. SUBJECTIVE:    Patient seen and examined  Records reviewed. Temp (24hrs), Av °F (36.1 °C), Min:96.8 °F (36 °C), Max:97.5 °F (36.4 °C)    DIET: DIET RENAL;  CODE: Full Code    Intake/Output Summary (Last 24 hours) at 2021 1009  Last data filed at 2021 1820  Gross per 24 hour   Intake 100 ml   Output --   Net 100 ml       OBJECTIVE:    BP (!) 172/78   Pulse 102   Temp 97.5 °F (36.4 °C)   Resp 18   Ht 4' 11\" (1.499 m)   Wt 173 lb 8 oz (78.7 kg)   SpO2 100%   BMI 35.04 kg/m²     General appearance: No apparent distress, appears stated age and cooperative. HEENT:  Conjunctivae/corneas clear. Neck: Supple. No jugular venous distention.    Respiratory: Bilateral rhonchi  Cardiovascular: Regular rate rhythm, normal S1-S2  Abdomen: Soft, nontender, nondistended  Musculoskeletal: Edema  Skin:  No rashes  on visible skin  Neurologic: awake, alert and following commands     ASSESSMENT:  ESRD needing dialysis  Volume overload  Acute on chronic diastolic congestive heart failure  Acute respiratory failure with hypoxia  Thigh/groin abscess  History of hidradenitis suppurativa  Hyperkalemia  Elevated troponin in the setting of ESRD  Poorly controlled type 2 diabetes  Hypertension     PLAN:  Surgery following  I&D today  Continue Zosyn  Consult nephrology  Monitor serum electrolytes  Sliding scale insulin  Bumex 1 mg IV twice daily    Medications:  REVIEWED DAILY    Infusion Medications    dextrose       Scheduled Medications    bumetanide  1 mg Intravenous Q12H    vancomycin (VANCOCIN) intermittent dosing (placeholder)   Other RX Placeholder    morphine  4 mg Intravenous Once    b complex-C-folic acid  1 mg Oral Dinner    carvedilol  25 mg Oral BID WC    vitamin D  2,000 Units Oral Daily    famotidine  20 mg Oral QAM    insulin glargine  16 Units Subcutaneous Nightly    insulin lispro  10 Units Subcutaneous TID WC    insulin lispro  0-10 Units Subcutaneous 4x Daily AC & HS    piperacillin-tazobactam  3,375 mg Intravenous Q12H    sodium chloride flush  10 mL Intravenous 2 times per day    heparin (porcine)  5,000 Units Subcutaneous Q8H     PRN Meds: glucose, dextrose, glucagon (rDNA), dextrose, albuterol, sodium chloride flush, promethazine **OR** ondansetron, polyethylene glycol, acetaminophen **OR** acetaminophen    Labs:     Recent Labs     01/27/21  1433   WBC 7.5   HGB 7.9*   HCT 24.1*          Recent Labs     01/27/21  1433 01/27/21  2152    136   K 5.9* 6.0*   CL 93* 96*   CO2 23 21*   BUN 54* 54*   CREATININE 14.9* 15.1*   CALCIUM 9.2 9.0       Recent Labs     01/27/21  1433   PROT 6.8   ALKPHOS 99   ALT 33*   AST 18   BILITOT 0.3       No results for input(s): INR in the last 72 hours. Recent Labs     01/27/21  1433   TROPONINI 0.13*       Chronic labs:    Lab Results   Component Value Date    CHOL 155 10/08/2013    TRIG 68 10/08/2013    HDL 50.0 10/08/2013    LDLCALC 91 10/08/2013    TSH 3.850 01/27/2021    INR 1.1 11/26/2018    LABA1C 9.1 (H) 01/27/2021       Radiology: REVIEWED DAILY    +++++++++++++++++++++++++++++++++++++++++++++++++  [address], [place]  +++++++++++++++++++++++++++++++++++++++++++++++++  NOTE: This report was transcribed using voice recognition software. Every effort was made to ensure accuracy; however, inadvertent computerized transcription errors may be present.

## 2021-01-28 NOTE — CONSULTS
GENERAL SURGERY  CONSULT NOTE  2021    Physician Consulted: Dr. Elana Noriega  Reason for Consult: Perineal abscess  Referring Physician: Dr. Pauly Marrero is a 38 y.o. female who presents for evaluation of perineal abscess. Patient has end-stage renal disease and is on home HD. Her last dialysis treatment was Friday. She is presenting with increased pain and tenderness in her left perineal region. On exam patient has significant bilateral upper and lower extremity edema. Her electrolytes and labs show a hyperkalemic state with an elevated BUN/creatinine. She is extremely tender on her inner thigh where she has purulent drainage coming from an area roughly 2 cm from the left labial fold. Area is well indurated on medial thigh and down in the perineal and gluteal area. Patient has no leukocytosis. Patient is afebrile. Patient denies any fever or chills. She denies any changes in her bowel habits. Patient has a significant history of multiple axillary and gluteal abscesses. Last I&D was in the office on 3/7/2020. Past Medical History:   Diagnosis Date    JOLLY (acute kidney injury) ([address] 2016    AVF (arteriovenous fistula) ([address] 2018    let arm    Chronic kidney disease     Dialysis AV fistula malfunction, initial encounter ([address] 2019    DM type 1 (diabetes mellitus, type 1) ([address]     Encounter regarding vascular access for dialysis for ESRD ([address] 2018    [facility] ([facility]     Hemodialysis patient ([facility]     amrita dawson  / graft in left arm    Hypertension     Tobacco abuse 2016       Past Surgical History:   Procedure Laterality Date     SECTION  2013    CYST INCISION AND DRAINAGE  2011    perirectal abscess. [facility].  Dr. Gerard Marques Left 2019    LEFT ARM FISTULAGRAM, BALLOON ANGIOPLASTY performed by Juan Carlos Webb MD at [place] [address] CURETTAGE OF UTERUS  2009    FRACTURE SURGERY  October 11,2012    fracture right ulnar, left tibia, and pelvis    OTHER SURGICAL HISTORY  1017/12    open reduction internal fixation left radial shaft    OTHER SURGICAL HISTORY  08/19/2016    pericardial window    OTHER SURGICAL HISTORY  08/23/2016     r tesio insertion  / remove 8/16/2018    OTHER SURGICAL HISTORY Left 02/17/2017    insertion a-v fistula left arm    TX ANASTOMOSIS,AV,ANY SITE Left 7/17/2018    REVISION AV FISTULA - LEFT ARM performed by Wing Jada MD at [facility] NON-TUNNEL CV CATH N/A 5/10/2018    TESIO CATHETER INSERTION performed by Iker Monson MD at [address] PRERETINAL MEMBRANE Left 8/28/2018    PARS PLANA VITRECTOMY 25 GAUGE, VITREOUS HEMORRHAGE REMOVAL, ENDO LASER, AIR/FLUID  EXCHANGE LEFT EYE performed by Soledad Ochoa MD at [facility] OR       Medications Prior to Admission:    Prior to Admission medications    Medication Sig Start Date End Date Taking?  Authorizing Provider   Cholecalciferol (VITAMIN D3) 50 MCG (2000 UT) CAPS Take 2,000 Units by mouth daily   Yes Historical Provider, MD   Insulin Glargine, 2 Unit Dial, (TOUJEO MAX SOLOSTAR) 300 UNIT/ML SOPN Inject 20 Units into the skin nightly   Yes Historical Provider, MD   insulin lispro, 1 Unit Dial, (HUMALOG KWIKPEN) 100 UNIT/ML SOPN Inject 10 Units into the skin 3 times daily With Lunch,Dinner,Snack   Yes Historical Provider, MD   amLODIPine (NORVASC) 5 MG tablet Take 5 mg by mouth daily  3/13/20  Yes Historical Provider, MD   famotidine (PEPCID) 20 MG tablet Take 1 tablet by mouth every morning 2/25/20  Yes KEMI Gaytan CNP   B Complex-C-Folic Acid (YOLANDA-JACOB) TABS Take 1 tablet by mouth Daily with supper 2/25/20  Yes KEMI Gaytan CNP   medroxyPROGESTERone (DEPO-PROVERA) 150 MG/ML injection Inject 150 mg into the muscle every 3 months  11/17/19  Yes Historical Provider, MD   albuterol sulfate HFA 108 (90 Base) MCG/ACT inhaler Inhale 2 puffs into the lungs every 4-6 hours as needed for Wheezing or Shortness of Breath 12/5/19  Yes Caro Elmore PA-C   carvedilol (COREG) 25 MG tablet Take 25 mg by mouth 2 times daily  7/7/19  Yes Historical Provider, MD       No Known Allergies    Family History   Problem Relation Age of Onset    Stroke Mother     Cancer Father     Diabetes Paternal Aunt        Social History     Tobacco Use    Smoking status: Light Tobacco Smoker     Years: 15.00     Types: Cigarettes    Smokeless tobacco: Never Used    Tobacco comment: PPMonth   Substance Use Topics    Alcohol use: Not Currently     Alcohol/week: 0.0 standard drinks    Drug use: No         Review of Systems   General ROS: positive for  - fatigue  Hematological and Lymphatic ROS: negative  Respiratory ROS: no cough, shortness of breath, or wheezing  Cardiovascular ROS: no chest pain or dyspnea on exertion  Gastrointestinal ROS: no abdominal pain, change in bowel habits, or black or bloody stools  Genito-Urinary ROS: Pain with purulent drainage from an abscess that is 2 cm from labial fold on the left. Area is well indurated extending into the perineal/gluteal region as well as extending up in the medial thigh  Musculoskeletal ROS: negative      PHYSICAL EXAM:    Vitals:    01/27/21 1744   BP: (!) 155/83   Pulse: 89   Resp: 18   Temp:    SpO2: 99%       General Appearance:  awake, alert, oriented, in  acute distress  Skin:  Skin color, texture, turgor normal.  Many scars from previous axilla and gluteal abscesses. Head/face:  NCAT  Eyes:  PERRL  Lungs:  No chest wall tenderness. Heart:  Heart regular rate and rhythm  Abdomen:  Soft, non-tender, normal bowel sounds. No bruits, organomegaly or masses. Extremities: Pain with purulent drainage from an abscess that is 2 cm from labial fold on the left. Area is well indurated extending into the perineal/gluteal region as well as extending up in the medial thigh. There is purulent foul-smelling drainage  Female Rectal: No hemorrhoids, normal rectal tone, no masses. Abscess does not track near the rectal vault    LABS:    CBC  Recent Labs     01/27/21  1433   WBC 7.5   HGB 7.9*   HCT 24.1*        BMP  Recent Labs     01/27/21  1433      K 5.9*   CL 93*   CO2 23   BUN 54*   CREATININE 14.9*   CALCIUM 9.2     Liver Function  Recent Labs     01/27/21  1433   BILITOT 0.3   AST 18   ALT 33*   ALKPHOS 99   PROT 6.8   LABALBU 3.5     No results for input(s): LACTATE in the last 72 hours. No results for input(s): INR, PTT in the last 72 hours. Invalid input(s): PT    RADIOLOGY    Ct Abdomen Pelvis W Iv Contrast Additional Contrast? None    Result Date: 1/27/2021  EXAMINATION: CT OF THE ABDOMEN AND PELVIS WITH CONTRAST 1/27/2021 5:13 pm TECHNIQUE: CT of the abdomen and pelvis was performed with the administration of intravenous contrast. Multiplanar reformatted images are provided for review. Dose modulation, iterative reconstruction, and/or weight based adjustment of the mA/kV was utilized to reduce the radiation dose to as low as reasonably achievable. COMPARISON: None. HISTORY: ORDERING SYSTEM PROVIDED HISTORY: left groin abscess, r/o gas, TECHNOLOGIST PROVIDED HISTORY: Additional Contrast?->None Reason for exam:->left groin abscess, r/o gas, What reading provider will be dictating this exam?->CRC FINDINGS: Lower Chest: Increased interstitial markings and chronic parenchymal changes at the lung bases. Small right pleural effusion. Enlarged subcarinal lymph node measuring 2.7 x 2.4 cm noted. Organs: No focal liver lesions. Gallstone versus sludge noted in the gallbladder. Mild pericholecystic fluid collection. Spleen is not enlarged. Pancreas shows normal contour. Adrenal glands appear unremarkable. The kidneys are atrophic bilaterally. GI/Bowel: No obstructing or constricting mass lesions.  Pelvis: Incompletely distended bladder with thickening of the bladder wall could be due to pseudo thickening or cystitis. Mild-to-moderate ascites. Peritoneum/Retroperitoneum: Ascites. No free air or significant adenopathy. Bones/Soft Tissues: Diffuse subcutaneous edema. Orthopedic hardware through the sacrum and SI joints. Mild to moderate ascites and diffuse subcutaneous edema. Severely atrophic kidneys. Gallstones/gallbladder sludge. Parenchymal changes and interstitial infiltrates in the visualized lung bases with a small right pleural effusion. Partially visualized enlarged subcarinal lymph node. Consider chest CT for complete evaluation of the chest.    Xr Chest Portable    Result Date: 1/27/2021  EXAMINATION: ONE XRAY VIEW OF THE CHEST 1/27/2021 2:46 pm COMPARISON: August 24, 2020 HISTORY: ORDERING SYSTEM PROVIDED HISTORY: sob, CHF TECHNOLOGIST PROVIDED HISTORY: Reason for exam:->sob, CHF What reading provider will be dictating this exam?->CRC FINDINGS: The heart is enlarged. There are bilateral infiltrates. The costophrenic angles are mildly blunted. No pneumothorax. CHF. Superimposed pneumonia cannot be excluded.         ASSESSMENT:  38 y.o. female with end-stage renal disease on home HD found to have left-sided perineal abscess    PLAN:  Admit to medicine  Overall care per primary  Patient will need to be medically managed, dialysis prior to OR for incision and drainage of abscess  We will plan on I&D tomorrow 1/28  Continue antibiotic-Zosyn  N.p.o. at midnight, IV fluids      Electronically signed by Hayley Mcadams DO on 1/27/21 at 8:16 PM EST

## 2021-01-28 NOTE — PROGRESS NOTES
Pharmacy Consultation Note  (Antibiotic Dosing and Monitoring)    Initial consult date: 21  Consulting physician: Dr. Roxane Sorensen  Drug: Vancomycin  Indication: Perineal Abscess    Age/  Gender Height Weight IBW Dosing weight  Allergy Information   35 y.o./female 4' 11\" (149.9 cm) 135 lb (61.2 kg)     Patient must be at least 60 in tall to calculate ideal body weight  61.2 kg  Patient has no known allergies. Temp (24hrs), Av.9 °F (36.1 °C), Min:96.9 °F (36.1 °C), Max:96.9 °F (36.1 °C)          Date  WBC BUN SCr CrCl  (mL/min) Drug/Dose Time   Given Level(s)   (Time) Comments   21 7.5 54 14.9  5   Vancomycin 1,250 mg IV                                              Intake/Output Summary (Last 24 hours) at 2021 2200  Last data filed at 2021 1820  Gross per 24 hour   Intake 100 ml   Output --   Net 100 ml       Historical Cultures:  Organism   Date Value Ref Range Status   2020 Anaerobic gram negative sharon (A)  Final   2020 Corynebacterium species (A)  Final   2020 Micrococcus species (A)  Final     No results for input(s): BC in the last 72 hours. Cultures:  available culture and sensitivity results were reviewed in [facility]    Assessment:  · 38 y.o. female has been initiated Vancomycin.   · Estimated CrCl = 5 mL/min  · ESRD on HD  · Goal trough level = 15-20 mcg/mL    Plan:  · Will initiate vancomycin at a dose of 1,250 mg x 1 dose starting 21 @ 2230  · Further doses based on vancomycin levels  · Monitor renal function   · Clinical pharmacy to follow      Rhona Finn, [address] [facility] 2021 10:00 PM

## 2021-01-28 NOTE — H&P
Hospital Medicine History & Physical      PCP: KEMI Baer - CNP    Date of Admission: 1/27/2021    Date of Service: Pt seen/examined on 1/27/2021 and Admitted to Inpatient with expected LOS greater than two midnights due to medical therapy. Chief Complaint: Shortness of breath      History Of Present Illness:      38 y.o. female who presented to [facility] with past medical history of end-stage renal disease on peritoneal dialysis, diabetes, chronic right upper extremity DVT, cellulitis, CHF, hidradenitis suppurativa, sacral masoud fracture status post pelvic hardware, CHF, anemia, tobacco dependence and hypertension. Patient has not been having dialysis for some days. Came in with generalized edema that is severe, constant, associated with shortness of breath and cough productive of sputum. She states that she still makes some urine. She denies any chest pain. She also has pain in the groin area to the left where she reports having had a boil that opened and drained. Pain in this area is severe as well and constant. Worse with movement. Patient is quite lethargic, she does not want to answer questions. She is moaning and having grunting respirations. Vital signs notable for respiratory rate of 24, blood pressure 155/83, requiring 4 L of oxygen, she was saturating in the 80s off oxygen. Labs showed hemoglobin of 7.9, lactic acid level 1.9, negative SARS-CoV-2, troponin 0 0.13, potassium 5.9, glucose 475, proBNP 31,163, BUN 54, creatinine 14.9. Chest x-ray shows CHF, CT scan of the abdomen showed ascites with subcutaneous edema, gallstones with sludge and orthopedic hardware in the sacral area going into the SI joint. Echo in 2016 showed EF of 60%. She is being admitted for further management.     Past Medical History:          Diagnosis Date    JOLLY (acute kidney injury) ([facility] 4/5/2016    AVF (arteriovenous fistula) ([facility] 02/19/2018    let arm    Chronic kidney disease     Dialysis AV fistula malfunction, initial encounter ([facility] 2019    DM type 1 (diabetes mellitus, type 1) ([facility]     Encounter regarding vascular access for dialysis for ESRD ([facility] 2018    ESRD (end stage renal disease) ([facility]     Hemodialysis patient ([facility]     amrita dawson  / graft in left arm    Hypertension     Tobacco abuse 2016       Past Surgical History:          Procedure Laterality Date     SECTION      CYST INCISION AND DRAINAGE  2011    perirectal abscess. [facility]. Dr. Donnell Rosenberg Left 2019    LEFT ARM FISTULAGRAM, BALLOON ANGIOPLASTY performed by Pedro Salcedo MD at [address] [facility] [address] [facility] [address]      FRACTURE SURGERY      fracture right ulnar, left tibia, and pelvis    OTHER SURGICAL HISTORY      open reduction internal fixation left radial shaft    OTHER SURGICAL HISTORY  2016    pericardial window    OTHER SURGICAL HISTORY  2016     r tesio insertion  / remove 2018    OTHER SURGICAL HISTORY Left 2017    insertion a-v fistula left arm    CO ANASTOMOSIS,AV,ANY SITE Left 2018    REVISION AV FISTULA - LEFT ARM performed by Pedro Salcedo MD at [facility] NON-TUNNEL CV CATH N/A 5/10/2018    TESIO CATHETER INSERTION performed by William Pierre MD at [address] PRERETINAL MEMBRANE Left 2018    PARS PLANA VITRECTOMY 25 GAUGE, VITREOUS HEMORRHAGE REMOVAL, ENDO LASER, AIR/FLUID  EXCHANGE LEFT EYE performed by Jean Claude Mcmanus MD at [facility] OR       Medications Prior to Admission:      Prior to Admission medications    Medication Sig Start Date End Date Taking?  Authorizing Provider   Cholecalciferol (VITAMIN D3) 50 MCG ( UT) CAPS Take 2,000 Units by mouth daily   Yes Historical Provider, MD   Insulin Glargine, 2 Unit Dial, (TOUJEO MAX SOLOSTAR) 300 UNIT/ML SOPN Inject 20 Units into the skin nightly   Yes Historical Provider, MD   insulin lispro, 1 Unit Dial, (HUMALOG KWIKPEN) 100 UNIT/ML SOPN Inject 10 Units into the skin 3 times daily With Lunch,Dinner,Snack   Yes Historical Provider, MD   amLODIPine (NORVASC) 5 MG tablet Take 5 mg by mouth daily  3/13/20  Yes Historical Provider, MD   famotidine (PEPCID) 20 MG tablet Take 1 tablet by mouth every morning 2/25/20  Yes Virgil Rule, APRN - CNP   B Complex-C-Folic Acid (YOLANDA-JACOB) TABS Take 1 tablet by mouth Daily with supper 2/25/20  Yes Virgil Valera, KEMI - PEDRO   medroxyPROGESTERone (DEPO-PROVERA) 150 MG/ML injection Inject 150 mg into the muscle every 3 months  11/17/19  Yes Historical Provider, MD   albuterol sulfate  (90 Base) MCG/ACT inhaler Inhale 2 puffs into the lungs every 4-6 hours as needed for Wheezing or Shortness of Breath 12/5/19  Yes Caro Elmore PA-C   carvedilol (COREG) 25 MG tablet Take 25 mg by mouth 2 times daily  7/7/19  Yes Historical Provider, MD       Allergies:  Patient has no known allergies. Social History:      The patient currently lives at home. TOBACCO:   reports that she has been smoking cigarettes. She has smoked for the past 15.00 years. She has never used smokeless tobacco.  ETOH:   reports previous alcohol use. Family History:     Reviewed in detail Positive as follows:        Problem Relation Age of Onset   Powers Stroke Mother     Cancer Father     Diabetes Paternal Aunt        REVIEW OF SYSTEMS:   Pertinent positives as noted in the HPI. All other systems reviewed and negative. PHYSICAL EXAM:    BP (!) 155/83   Pulse 89   Temp 96.9 °F (36.1 °C) (Infrared)   Resp 18   Ht 4' 11\" (1.499 m)   Wt 135 lb (61.2 kg)   SpO2 99%   BMI 27.27 kg/m²     General appearance: Young female, moderate painful and respiratory distress, appears stated age and cooperative. Afebrile. HEENT:  Normal cephalic, atraumatic with periorbital puffiness. Pupils equal, round, and reactive to light.   Conjunctivae/corneas clear.  Neck: Supple, with limited range of motion. No jugular venous distention. Trachea midline. Respiratory: Increased work of breathing with grunting respirations. Diffuse rhonchi. Cardiovascular:  Regular rate and rhythm with normal S1/S2 without murmurs, rubs or gallops. Abdomen: Soft, non-tender, distended with normal bowel sounds. Musculoskeletal:  No clubbing/cyanosis, body wide edema bilaterally. Limited range of motion without deformity. Skin: Skin color, texture, turgor normal.  Abscess inside the inner aspect of the left thigh with purulent drainage. Neurologic: Lethargic but arousable. Cranial nerves: II-XII intact, grossly non-focal.  Psychiatric: Lethargic but arousable and oriented, thought content appropriate, normal insight  Capillary Refill: Brisk,< 3 seconds   Peripheral Pulses:  palpable, equal bilaterally       Labs:     Recent Labs     01/27/21  1433   WBC 7.5   HGB 7.9*   HCT 24.1*        Recent Labs     01/27/21  1433      K 5.9*   CL 93*   CO2 23   BUN 54*   CREATININE 14.9*   CALCIUM 9.2     Recent Labs     01/27/21  1433   AST 18   ALT 33*   BILITOT 0.3   ALKPHOS 99     No results for input(s): INR in the last 72 hours. Recent Labs     01/27/21  1433   TROPONINI 0.13*       Urinalysis:      Lab Results   Component Value Date    NITRU Negative 11/23/2017    WBCUA 2-5 11/23/2017    WBCUA NONE 11/21/2010    BACTERIA RARE 11/23/2017    RBCUA 5-10 11/23/2017    RBCUA 0-1 09/29/2013    BLOODU MODERATE 11/23/2017    SPECGRAV 1.015 11/23/2017    GLUCOSEU 500 11/23/2017    GLUCOSEU >=1000 11/21/2010       Radiology:   Reviewed and documented    CT ABDOMEN PELVIS W IV CONTRAST Additional Contrast? None   Final Result   Mild to moderate ascites and diffuse subcutaneous edema. Severely atrophic kidneys. Gallstones/gallbladder sludge. Parenchymal changes and interstitial infiltrates in the visualized lung bases   with a small right pleural effusion.   Partially visualized enlarged   subcarinal lymph node. Consider chest CT for complete evaluation of the   chest.      XR CHEST PORTABLE   Final Result   CHF. Superimposed pneumonia cannot be excluded. US DUP LOWER EXTREMITIES BILATERAL VENOUS    (Results Pending)       ASSESSMENT:    Active Hospital Problems    Diagnosis Date Noted    ESRD needing dialysis ([facility] [N18.6, Z99.2] 01/27/2021    Volume overload [E87.70] 01/27/2021    Elevated troponin [R77.8] 01/27/2021    Gall stone [K80.20] 01/27/2021    Non-compliance [Z91.19] 01/27/2021    Acute congestive heart failure ([facility] [I50.9] 07/21/2020    Anemia [D64.9] 07/14/2020    Abscess [L02.91] 03/17/2020    Hyperkalemia [E87.5]     Uncontrolled type 1 diabetes mellitus with hyperglycemia ([facility] [E10.65] 05/07/2018    HTN (hypertension), benign [I10] 10/17/2017         PLAN:  1. End-stage renal disease needing dialysis. Patient is on peritoneal dialysis and has not been compliant for some days. Currently volume overloaded. Consult has been placed to nephrology. 2.  Acute on chronic diastolic CHF secondary to volume overload. Patient awaiting dialysis. Diurese with Bumex. Monitor I's and O's and daily weights. 3. Acute respiratory failure with hypoxia. New oxygen requirement. This is secondary to volume overload. 4. Hyperkalemia needs dialysis. Received appropriate treatment in the ER. Will recheck. 5.  Elevated troponin in the setting of renal insufficiency and volume overload  6. Gallstones with sludge. No abdominal pain. 7.  Anemia of chronic disease. Monitor hemoglobin. Transfuse as needed. 8.  Left inner thigh/groin abscess, hx of hydradenitis suppurativa, she is being evaluated by surgery. IV Zosyn. Blood and wound cultures. 9.  Uncontrolled type 2 diabetes with hyperglycemia, resume home insulin, sliding scale coverage, monitor blood sugar currently not in DKA. 10.  Hypertension, blood pressure is elevated. Resume home medication.   11. Noncompliance      DVT Prophylaxis: Heparin  Diet: DIET RENAL;  Code Status: Full Code    PT/OT Eval Status: As needed  Dispo -inpatient/telemetry       Yashira Alvares MD    Thank you KEMI Carter CNP for the opportunity to be involved in this patient's care.

## 2021-01-28 NOTE — ED PROVIDER NOTES
Patient is a 39-year-old female past medical history of end-stage renal disease, DVT, CHF presenting to the emergency department with shortness of breath and wound check. Symptoms severe in severity and constant since onset. Patient states she has not performed any dialysis treatments since last Friday. She has hemodialysis at home which she performs her self. She states that 4 treatments a week. She states last week she needed to and she has not done hemodialysis since Friday. She states she has noted she has been more short of breath and \"feeling with fluid\". She states she does note a weight gain. This is all since started since she did not perform her dialysis. Patient is asked why she does not perform it she states that she had a vaginal abscess which causes it to be too painful to sit up to perform. She states there is an abscess in her left groin/thigh area. She has had these in the past.  She had had them drained in the OR before. She denies any fevers or chills. She states that she thinks the abscess is getting bigger and it has intermittently drained mildly at home. She denies any nausea, vomiting, abdominal pain, diarrhea or constipation. She does currently still make urine. Exerting herself makes the shortness of breath worse and nothing make symptoms better. She denies any active chest pain, abdominal pain. Review of Systems   Constitutional: Negative for chills and fever. HENT: Negative for congestion and sore throat. Eyes: Negative for pain and visual disturbance. Respiratory: Positive for shortness of breath. Negative for cough and wheezing. Cardiovascular: Positive for leg swelling. Negative for chest pain. Gastrointestinal: Positive for abdominal distention. Negative for abdominal pain, diarrhea, nausea and vomiting. Genitourinary: Negative for dysuria and frequency. Musculoskeletal: Negative for arthralgias and back pain. Skin: Positive for wound. falling asleep in the room she does desat to 89%. She is currently wearing oxygen. She has evidence of fluid overload in her legs, abdomen and diminished breath sounds in the bases. She has not had dialysis last week. Concern for hyperkalemia. Lab work obtained. She does have potassium of 5.9. Calcium and hyperkalemia protocol initiated. Her creatinine is 14.9. Her electrolyte derangements most likely secondary to being noncompliant with hemodialysis. She does have a BNP of 31,000. This coupled with her physical exam findings as well as effusions on chest x-ray, most likely secondary to fluid overload from end-stage renal.  Troponin is elevated at 0.13. Patient denying any active chest pain at this time. EKG with no STEMI. Troponin most likely secondary to her end-stage renal disease and kidney function. With no active chest pain, it is possibly secondary to demand ischemia as well because of her shortness of breath and fluid overload. CT was performed demonstrating mild to moderate ascites and subcu edema. She is atrophic kidneys, gallbladder sludge and interstitial infiltrates in the lung bases with a pleural effusion. I did review the CT imaging and there is evidence of the abscess in the left thigh/gluteal region. I do not identify any appreciated gas. Patient has had abscess drained like this in the past and multiple times requiring the OR. Because of the location and concern for the location close to the perianal and rectal region, consult was placed to general surgery. She was started on antibiotics. Consult was placed to patient's nephrologist to discuss for fluid overload. They will provide consultation. Consult also placed to on-call hospitalist who accepts patient for admission. With patient's fluid overload and abscess, she would benefit from inpatient evaluation and care. Educated patient on symptoms, diagnosis and need to stay in hospital for further evaluation and care.   She verbalized understanding was agreeable to plan. All questions were answered. Patient was admitted. ED Course as of  Spoke with Dr. Negro Hankins, will consult. []      ED Course User Index  [] Ayanna Linares, DO           ----------------------------------------------- PAST HISTORY --------------------------------------------  Past Medical History:  has a past medical history of JOLLY (acute kidney injury) ([facility] Chronic kidney disease, Dialysis AV fistula malfunction, initial encounter ([facility] DM type 1 (diabetes mellitus, type 1) ([facility] Encounter regarding vascular access for dialysis for ESRD ([facility] ESRD (end stage renal disease) ([facility] Hemodialysis patient ([facility] Hypertension, and Tobacco abuse. Past Surgical History:  has a past surgical history that includes Dilation and curettage of uterus (); cyst incision and drainage (2011); other surgical history (); fracture surgery (); other surgical history (2016); other surgical history (2016); other surgical history (Left, 2017);  section (); pr insert non-tunnel cv cath (N/A, 5/10/2018); pr anastomosis,av,any site (Left, 2018); pr vitrectomy pars plana remove preretinal membrane (Left, 2018); and Dialysis fistula creation (Left, 2019). Social History:  reports that she has been smoking cigarettes. She has smoked for the past 15.00 years. She has never used smokeless tobacco. She reports previous alcohol use. She reports that she does not use drugs. Family History: family history includes Cancer in her father; Diabetes in her paternal aunt; Stroke in her mother. The patients home medications have been reviewed. Allergies: Patient has no known allergies.     ------------------------------------------------ RESULTS ---------------------------------------------------    LABS:  Results for orders placed or performed during the hospital encounter of 01/27/21   Comprehensive Metabolic Panel w/ Reflex to MG   Result Value Ref Range    Sodium 132 132 - 146 mmol/L    Potassium reflex Magnesium 5.9 (H) 3.5 - 5.0 mmol/L    Chloride 93 (L) 98 - 107 mmol/L    CO2 23 22 - 29 mmol/L    Anion Gap 16 7 - 16 mmol/L    Glucose 475 (H) 74 - 99 mg/dL    BUN 54 (H) 6 - 20 mg/dL    CREATININE 14.9 (HH) 0.5 - 1.0 mg/dL    GFR Non-African American 3 >=60 mL/min/1.73    GFR African American 3     Calcium 9.2 8.6 - 10.2 mg/dL    Total Protein 6.8 6.4 - 8.3 g/dL    Albumin 3.5 3.5 - 5.2 g/dL    Total Bilirubin 0.3 0.0 - 1.2 mg/dL    Alkaline Phosphatase 99 35 - 104 U/L    ALT 33 (H) 0 - 32 U/L    AST 18 0 - 31 U/L   CBC Auto Differential   Result Value Ref Range    WBC 7.5 4.5 - 11.5 E9/L    RBC 2.46 (L) 3.50 - 5.50 E12/L    Hemoglobin 7.9 (L) 11.5 - 15.5 g/dL    Hematocrit 24.1 (L) 34.0 - 48.0 %    MCV 98.0 80.0 - 99.9 fL    MCH 32.1 26.0 - 35.0 pg    MCHC 32.8 32.0 - 34.5 %    RDW 17.2 (H) 11.5 - 15.0 fL    Platelets 617 565 - 306 E9/L    MPV 10.1 7.0 - 12.0 fL    Neutrophils % 73.9 43.0 - 80.0 %    Immature Granulocytes % 0.7 0.0 - 5.0 %    Lymphocytes % 14.6 (L) 20.0 - 42.0 %    Monocytes % 6.1 2.0 - 12.0 %    Eosinophils % 4.3 0.0 - 6.0 %    Basophils % 0.4 0.0 - 2.0 %    Neutrophils Absolute 5.53 1.80 - 7.30 E9/L    Immature Granulocytes # 0.05 E9/L    Lymphocytes Absolute 1.09 (L) 1.50 - 4.00 E9/L    Monocytes Absolute 0.46 0.10 - 0.95 E9/L    Eosinophils Absolute 0.32 0.05 - 0.50 E9/L    Basophils Absolute 0.03 0.00 - 0.20 E9/L   Lactate, Sepsis   Result Value Ref Range    Lactic Acid, Sepsis 1.9 0.5 - 1.9 mmol/L   Troponin   Result Value Ref Range    Troponin 0.13 (H) 0.00 - 0.03 ng/mL   COVID-19   Result Value Ref Range    SARS-CoV-2, NAAT Not Detected Not Detected   Brain Natriuretic Peptide   Result Value Ref Range    Pro-BNP 31,163 (H) 0 - 125 pg/mL   Lactate, Sepsis   Result Value Ref Range    Lactic Acid, Sepsis 1.1 0.5 - 1.9 mmol/L   Basic Metabolic Panel w/ Reflex to MG   Result Value Ref Range    Sodium 136 132 - 146 mmol/L    Potassium reflex Magnesium 6.0 (H) 3.5 - 5.0 mmol/L    Chloride 96 (L) 98 - 107 mmol/L    CO2 21 (L) 22 - 29 mmol/L    Anion Gap 19 (H) 7 - 16 mmol/L    Glucose 383 (H) 74 - 99 mg/dL    BUN 54 (H) 6 - 20 mg/dL    CREATININE 15.1 (HH) 0.5 - 1.0 mg/dL    GFR Non-African American 3 >=60 mL/min/1.73    GFR African American 3     Calcium 9.0 8.6 - 10.2 mg/dL   Vitamin B12 & folate   Result Value Ref Range    Vitamin B-12 784 211 - 946 pg/mL    Folate 19.4 4.8 - 24.2 ng/mL   POCT Glucose   Result Value Ref Range    Meter Glucose 360 (H) 74 - 99 mg/dL   EKG 12 Lead   Result Value Ref Range    Ventricular Rate 88 BPM    Atrial Rate 88 BPM    P-R Interval 130 ms    QRS Duration 66 ms    Q-T Interval 354 ms    QTc Calculation (Bazett) 428 ms    P Axis 52 degrees    R Axis 27 degrees    T Axis 65 degrees       RADIOLOGY:    All Radiology results interpreted by Radiologist unless otherwise noted. US DUP LOWER EXTREMITIES BILATERAL VENOUS   Final Result   Within the visualized vessels there is no evidence for deep venous   thrombosis      CT ABDOMEN PELVIS W IV CONTRAST Additional Contrast? None   Final Result   Mild to moderate ascites and diffuse subcutaneous edema. Severely atrophic kidneys. Gallstones/gallbladder sludge. Parenchymal changes and interstitial infiltrates in the visualized lung bases   with a small right pleural effusion. Partially visualized enlarged   subcarinal lymph node. Consider chest CT for complete evaluation of the   chest.      XR CHEST PORTABLE   Final Result   CHF. Superimposed pneumonia cannot be excluded. EKG: This EKG is signed and interpreted by ED Physician. Time:  1507   Rate: 88  Rhythm: Sinus. Interpretation: non-specific EKG. . Normal axis deviation. No hyperacute T waves. No acute ST segment changes. Low voltage QRS.   Comparison: stable as compared to patient's most recent EKG, changes compared to previous EKG.    ---------------------------- NURSING NOTES AND VITALS REVIEWED -------------------------   The nursing notes within the ED encounter and vital signs as below have been reviewed.    BP (!) 144/73   Pulse 85   Temp 96.9 °F (36.1 °C)   Resp 18   Ht 4' 11\" (1.499 m)   Wt 135 lb (61.2 kg)   SpO2 100%   BMI 27.27 kg/m²   Oxygen Saturation Interpretation: Normal      ------------------------------------------PROGRESS NOTES -------------------------------------------    ED COURSE MEDICATIONS:                Medications   morphine sulfate (PF) injection 4 mg (4 mg Intravenous Not Given 1/27/21 1829)   glucose (GLUTOSE) 40 % oral gel 15 g (has no administration in time range)   dextrose 50 % IV solution (has no administration in time range)   glucagon (rDNA) injection 1 mg (has no administration in time range)   dextrose 5 % solution (has no administration in time range)   albuterol (PROVENTIL) nebulizer solution 2.5 mg (has no administration in time range)   b complex-C-folic acid (NEPHROCAPS) capsule 1 mg (has no administration in time range)   carvedilol (COREG) tablet 25 mg (has no administration in time range)   vitamin D (CHOLECALCIFEROL) tablet 2,000 Units (has no administration in time range)   famotidine (PEPCID) tablet 20 mg (has no administration in time range)   insulin glargine (LANTUS) injection vial 16 Units (16 Units Subcutaneous Given 1/27/21 2258)   insulin lispro (HUMALOG) injection vial 10 Units (has no administration in time range)   insulin lispro (HUMALOG) injection vial 0-10 Units (10 Units Subcutaneous Given 1/27/21 2257)   piperacillin-tazobactam (ZOSYN) 3,375 mg in dextrose 5 % 100 mL IVPB extended infusion (mini-bag) (has no administration in time range)   sodium chloride flush 0.9 % injection 10 mL (10 mLs Intravenous Given 1/27/21 0716)   sodium chloride flush 0.9 % injection 10 mL (has no administration in time range) promethazine (PHENERGAN) tablet 12.5 mg (has no administration in time range)     Or   ondansetron (ZOFRAN) injection 4 mg (has no administration in time range)   polyethylene glycol (GLYCOLAX) packet 17 g (has no administration in time range)   acetaminophen (TYLENOL) tablet 650 mg (has no administration in time range)     Or   acetaminophen (TYLENOL) suppository 650 mg (has no administration in time range)   heparin (porcine) injection 5,000 Units (has no administration in time range)   vancomycin (VANCOCIN) 1,250 mg in dextrose 5 % 250 mL IVPB (has no administration in time range)   ondansetron (ZOFRAN) injection 4 mg (4 mg Intravenous Given 1/27/21 1828)   sodium polystyrene (KAYEXALATE) 15 GM/60ML suspension 15 g (15 g Oral Given 1/27/21 1823)   sodium bicarbonate 8.4 % injection 50 mEq (50 mEq Intravenous Given 1/27/21 1823)   insulin regular (HUMULIN R;NOVOLIN R) injection 10 Units (10 Units Intravenous Given 1/27/21 1823)   iopamidol (ISOVUE-370) 76 % injection 90 mL (90 mLs Intravenous Given 1/27/21 1711)   sodium chloride flush 0.9 % injection 10 mL (10 mLs Intravenous Given 1/27/21 1711)   calcium gluconate 1000 mg in dextrose 5% 100 mL IVPB (0 mg Intravenous Stopped 1/27/21 1820)   piperacillin-tazobactam (ZOSYN) 3,375 mg in dextrose 5 % 100 mL IVPB (mini-bag) (3,375 mg Intravenous New Bag 1/27/21 2304)       CONSULTATIONS:            Consultation:  I Spoke with surgery discussed case. They will provide consultation. Consultation:  I Spoke with Dr. Stewart Craven (Medicine). Discussed case. They will admit this patient. Consultation:  I Spoke with Dr. Rain Ibrahim (nephrology). Discussed case. They will provide consultation. Vasiliy Gan PROCEDURES:            none.       COUNSELING:   I have spoken with the patient and discussed todays results, in addition to providing specific details for the plan of care and counseling regarding the diagnosis and prognosis.     --------------------------------------- IMPRESSION & DISPOSITION --------------------------------     IMPRESSION(s):  1. Acute hyperkalemia    2. Acute pulmonary edema (HCC)    3. Abscess    4. [facility]        This patient's ED course included: a personal history and physicial examination, re-evaluation prior to disposition, IV medications, cardiac monitoring, continuous pulse oximetry and complex medical decision making and emergency management    This patient has been closely monitored during their ED course. DISPOSITION:  Disposition: Admit to telemetry. Patient condition is serious. END OF PROVIDER NOTE.        [address] [place] [address] DO Vijay  Resident  01/27/21 [id number]

## 2021-01-29 ENCOUNTER — ANESTHESIA EVENT (OUTPATIENT)
Dept: OPERATING ROOM | Age: 36
DRG: 570 | End: 2021-01-29
Payer: MEDICARE

## 2021-01-29 ENCOUNTER — ANESTHESIA (OUTPATIENT)
Dept: OPERATING ROOM | Age: 36
DRG: 570 | End: 2021-01-29
Payer: MEDICARE

## 2021-01-29 VITALS — DIASTOLIC BLOOD PRESSURE: 84 MMHG | TEMPERATURE: 97 F | SYSTOLIC BLOOD PRESSURE: 149 MMHG | OXYGEN SATURATION: 98 %

## 2021-01-29 LAB
BASOPHILS ABSOLUTE: 0.02 E9/L (ref 0–0.2)
BASOPHILS RELATIVE PERCENT: 0.3 % (ref 0–2)
CHOLESTEROL, FASTING: 88 MG/DL (ref 0–199)
EOSINOPHILS ABSOLUTE: 0.33 E9/L (ref 0.05–0.5)
EOSINOPHILS RELATIVE PERCENT: 5.4 % (ref 0–6)
FERRITIN: 613 NG/ML
GRAM STAIN RESULT: ABNORMAL
HCT VFR BLD CALC: 21.9 % (ref 34–48)
HDLC SERPL-MCNC: 40 MG/DL
HEMOGLOBIN: 7.3 G/DL (ref 11.5–15.5)
IMMATURE GRANULOCYTES #: 0.04 E9/L
IMMATURE GRANULOCYTES %: 0.7 % (ref 0–5)
LDL CHOLESTEROL CALCULATED: 41 MG/DL (ref 0–99)
LYMPHOCYTES ABSOLUTE: 1.42 E9/L (ref 1.5–4)
LYMPHOCYTES RELATIVE PERCENT: 23.2 % (ref 20–42)
MCH RBC QN AUTO: 32 PG (ref 26–35)
MCHC RBC AUTO-ENTMCNC: 33.3 % (ref 32–34.5)
MCV RBC AUTO: 96.1 FL (ref 80–99.9)
METER GLUCOSE: 116 MG/DL (ref 74–99)
METER GLUCOSE: 215 MG/DL (ref 74–99)
METER GLUCOSE: 247 MG/DL (ref 74–99)
METER GLUCOSE: 365 MG/DL (ref 74–99)
METER GLUCOSE: 59 MG/DL (ref 74–99)
METER GLUCOSE: 61 MG/DL (ref 74–99)
METER GLUCOSE: 65 MG/DL (ref 74–99)
METER GLUCOSE: 66 MG/DL (ref 74–99)
METER GLUCOSE: 69 MG/DL (ref 74–99)
METER GLUCOSE: 73 MG/DL (ref 74–99)
METER GLUCOSE: 74 MG/DL (ref 74–99)
METER GLUCOSE: 81 MG/DL (ref 74–99)
MONOCYTES ABSOLUTE: 0.43 E9/L (ref 0.1–0.95)
MONOCYTES RELATIVE PERCENT: 7 % (ref 2–12)
NEUTROPHILS ABSOLUTE: 3.89 E9/L (ref 1.8–7.3)
NEUTROPHILS RELATIVE PERCENT: 63.4 % (ref 43–80)
ORGANISM: ABNORMAL
ORGANISM: ABNORMAL
PARATHYROID HORMONE INTACT: 191 PG/ML (ref 15–65)
PDW BLD-RTO: 16.9 FL (ref 11.5–15)
PHOSPHORUS: 5.7 MG/DL (ref 2.5–4.5)
PLATELET # BLD: 212 E9/L (ref 130–450)
PMV BLD AUTO: 9.6 FL (ref 7–12)
RBC # BLD: 2.28 E12/L (ref 3.5–5.5)
TRIGLYCERIDE, FASTING: 35 MG/DL (ref 0–149)
VANCOMYCIN RANDOM: 27.3 MCG/ML (ref 5–40)
VLDLC SERPL CALC-MCNC: 7 MG/DL
WBC # BLD: 6.1 E9/L (ref 4.5–11.5)
WOUND/ABSCESS: ABNORMAL
WOUND/ABSCESS: ABNORMAL

## 2021-01-29 PROCEDURE — 7100000000 HC PACU RECOVERY - FIRST 15 MIN: Performed by: SURGERY

## 2021-01-29 PROCEDURE — 82962 GLUCOSE BLOOD TEST: CPT

## 2021-01-29 PROCEDURE — 6360000002 HC RX W HCPCS: Performed by: STUDENT IN AN ORGANIZED HEALTH CARE EDUCATION/TRAINING PROGRAM

## 2021-01-29 PROCEDURE — 2580000003 HC RX 258: Performed by: STUDENT IN AN ORGANIZED HEALTH CARE EDUCATION/TRAINING PROGRAM

## 2021-01-29 PROCEDURE — 87075 CULTR BACTERIA EXCEPT BLOOD: CPT

## 2021-01-29 PROCEDURE — 6370000000 HC RX 637 (ALT 250 FOR IP): Performed by: STUDENT IN AN ORGANIZED HEALTH CARE EDUCATION/TRAINING PROGRAM

## 2021-01-29 PROCEDURE — 85025 COMPLETE CBC W/AUTO DIFF WBC: CPT

## 2021-01-29 PROCEDURE — 2500000003 HC RX 250 WO HCPCS: Performed by: FAMILY MEDICINE

## 2021-01-29 PROCEDURE — 3700000001 HC ADD 15 MINUTES (ANESTHESIA): Performed by: SURGERY

## 2021-01-29 PROCEDURE — 80061 LIPID PANEL: CPT

## 2021-01-29 PROCEDURE — 2580000003 HC RX 258: Performed by: EMERGENCY MEDICINE

## 2021-01-29 PROCEDURE — 2580000003 HC RX 258: Performed by: FAMILY MEDICINE

## 2021-01-29 PROCEDURE — 36415 COLL VENOUS BLD VENIPUNCTURE: CPT

## 2021-01-29 PROCEDURE — 83970 ASSAY OF PARATHORMONE: CPT

## 2021-01-29 PROCEDURE — 6370000000 HC RX 637 (ALT 250 FOR IP)

## 2021-01-29 PROCEDURE — 3600000002 HC SURGERY LEVEL 2 BASE: Performed by: SURGERY

## 2021-01-29 PROCEDURE — 3700000000 HC ANESTHESIA ATTENDED CARE: Performed by: SURGERY

## 2021-01-29 PROCEDURE — 2709999900 HC NON-CHARGEABLE SUPPLY: Performed by: SURGERY

## 2021-01-29 PROCEDURE — 84100 ASSAY OF PHOSPHORUS: CPT

## 2021-01-29 PROCEDURE — 7100000001 HC PACU RECOVERY - ADDTL 15 MIN: Performed by: SURGERY

## 2021-01-29 PROCEDURE — 2580000003 HC RX 258

## 2021-01-29 PROCEDURE — 6360000002 HC RX W HCPCS: Performed by: FAMILY MEDICINE

## 2021-01-29 PROCEDURE — 82728 ASSAY OF FERRITIN: CPT

## 2021-01-29 PROCEDURE — 6360000002 HC RX W HCPCS: Performed by: NURSE PRACTITIONER

## 2021-01-29 PROCEDURE — 3600000012 HC SURGERY LEVEL 2 ADDTL 15MIN: Performed by: SURGERY

## 2021-01-29 PROCEDURE — 90935 HEMODIALYSIS ONE EVALUATION: CPT

## 2021-01-29 PROCEDURE — 2500000003 HC RX 250 WO HCPCS

## 2021-01-29 PROCEDURE — 0JBB0ZZ EXCISION OF PERINEUM SUBCUTANEOUS TISSUE AND FASCIA, OPEN APPROACH: ICD-10-PCS | Performed by: STUDENT IN AN ORGANIZED HEALTH CARE EDUCATION/TRAINING PROGRAM

## 2021-01-29 PROCEDURE — 87070 CULTURE OTHR SPECIMN AEROBIC: CPT

## 2021-01-29 PROCEDURE — 56405 I&D VULVA/PERINEAL ABSCESS: CPT | Performed by: SURGERY

## 2021-01-29 PROCEDURE — 80202 ASSAY OF VANCOMYCIN: CPT

## 2021-01-29 PROCEDURE — 87205 SMEAR GRAM STAIN: CPT

## 2021-01-29 PROCEDURE — 6360000002 HC RX W HCPCS

## 2021-01-29 PROCEDURE — 2060000000 HC ICU INTERMEDIATE R&B

## 2021-01-29 PROCEDURE — 6370000000 HC RX 637 (ALT 250 FOR IP): Performed by: FAMILY MEDICINE

## 2021-01-29 RX ORDER — FENTANYL CITRATE 50 UG/ML
INJECTION, SOLUTION INTRAMUSCULAR; INTRAVENOUS PRN
Status: DISCONTINUED | OUTPATIENT
Start: 2021-01-29 | End: 2021-01-29 | Stop reason: SDUPTHER

## 2021-01-29 RX ORDER — ALBUTEROL SULFATE 90 UG/1
AEROSOL, METERED RESPIRATORY (INHALATION) PRN
Status: DISCONTINUED | OUTPATIENT
Start: 2021-01-29 | End: 2021-01-29 | Stop reason: SDUPTHER

## 2021-01-29 RX ORDER — PROPOFOL 10 MG/ML
INJECTION, EMULSION INTRAVENOUS PRN
Status: DISCONTINUED | OUTPATIENT
Start: 2021-01-29 | End: 2021-01-29 | Stop reason: SDUPTHER

## 2021-01-29 RX ORDER — SODIUM CHLORIDE 9 MG/ML
INJECTION, SOLUTION INTRAVENOUS CONTINUOUS PRN
Status: DISCONTINUED | OUTPATIENT
Start: 2021-01-29 | End: 2021-01-29 | Stop reason: SDUPTHER

## 2021-01-29 RX ORDER — LIDOCAINE HYDROCHLORIDE 20 MG/ML
INJECTION, SOLUTION INTRAVENOUS PRN
Status: DISCONTINUED | OUTPATIENT
Start: 2021-01-29 | End: 2021-01-29 | Stop reason: SDUPTHER

## 2021-01-29 RX ORDER — HYDRALAZINE HYDROCHLORIDE 50 MG/1
50 TABLET, FILM COATED ORAL EVERY 8 HOURS SCHEDULED
Status: DISCONTINUED | OUTPATIENT
Start: 2021-01-29 | End: 2021-01-31

## 2021-01-29 RX ORDER — PHENYLEPHRINE HCL IN 0.9% NACL 1 MG/10 ML
SYRINGE (ML) INTRAVENOUS PRN
Status: DISCONTINUED | OUTPATIENT
Start: 2021-01-29 | End: 2021-01-29 | Stop reason: SDUPTHER

## 2021-01-29 RX ORDER — ROCURONIUM BROMIDE 10 MG/ML
INJECTION, SOLUTION INTRAVENOUS PRN
Status: DISCONTINUED | OUTPATIENT
Start: 2021-01-29 | End: 2021-01-29 | Stop reason: SDUPTHER

## 2021-01-29 RX ORDER — HYDROCODONE BITARTRATE AND ACETAMINOPHEN 5; 325 MG/1; MG/1
1 TABLET ORAL EVERY 4 HOURS PRN
Status: DISPENSED | OUTPATIENT
Start: 2021-01-29 | End: 2021-02-01

## 2021-01-29 RX ORDER — PROMETHAZINE HYDROCHLORIDE 25 MG/ML
6.25 INJECTION, SOLUTION INTRAMUSCULAR; INTRAVENOUS
Status: DISCONTINUED | OUTPATIENT
Start: 2021-01-29 | End: 2021-01-29 | Stop reason: HOSPADM

## 2021-01-29 RX ORDER — DEXAMETHASONE SODIUM PHOSPHATE 10 MG/ML
INJECTION INTRAMUSCULAR; INTRAVENOUS PRN
Status: DISCONTINUED | OUTPATIENT
Start: 2021-01-29 | End: 2021-01-29 | Stop reason: SDUPTHER

## 2021-01-29 RX ORDER — HYDROCODONE BITARTRATE AND ACETAMINOPHEN 5; 325 MG/1; MG/1
0.5 TABLET ORAL EVERY 4 HOURS PRN
Status: ACTIVE | OUTPATIENT
Start: 2021-01-29 | End: 2021-02-01

## 2021-01-29 RX ORDER — ONDANSETRON 2 MG/ML
INJECTION INTRAMUSCULAR; INTRAVENOUS PRN
Status: DISCONTINUED | OUTPATIENT
Start: 2021-01-29 | End: 2021-01-29 | Stop reason: SDUPTHER

## 2021-01-29 RX ORDER — DEXTROSE MONOHYDRATE 50 MG/ML
INJECTION, SOLUTION INTRAVENOUS CONTINUOUS PRN
Status: DISCONTINUED | OUTPATIENT
Start: 2021-01-29 | End: 2021-01-29 | Stop reason: SDUPTHER

## 2021-01-29 RX ADMIN — INSULIN LISPRO 10 UNITS: 100 INJECTION, SOLUTION INTRAVENOUS; SUBCUTANEOUS at 21:36

## 2021-01-29 RX ADMIN — FENTANYL CITRATE 100 MCG: 50 INJECTION, SOLUTION INTRAMUSCULAR; INTRAVENOUS at 14:12

## 2021-01-29 RX ADMIN — ROCURONIUM BROMIDE 40 MG: 10 INJECTION, SOLUTION INTRAVENOUS at 14:12

## 2021-01-29 RX ADMIN — ONDANSETRON HYDROCHLORIDE 4 MG: 2 INJECTION, SOLUTION INTRAMUSCULAR; INTRAVENOUS at 14:34

## 2021-01-29 RX ADMIN — BUMETANIDE 1 MG: 0.25 INJECTION INTRAMUSCULAR; INTRAVENOUS at 05:17

## 2021-01-29 RX ADMIN — DEXTROSE MONOHYDRATE 100 ML/HR: 50 INJECTION, SOLUTION INTRAVENOUS at 09:42

## 2021-01-29 RX ADMIN — Medication 10 ML: at 21:47

## 2021-01-29 RX ADMIN — SODIUM CHLORIDE: 9 INJECTION, SOLUTION INTRAVENOUS at 13:58

## 2021-01-29 RX ADMIN — Medication 100 MCG: at 14:23

## 2021-01-29 RX ADMIN — DEXTROSE MONOHYDRATE 12.5 G: 25 INJECTION, SOLUTION INTRAVENOUS at 09:05

## 2021-01-29 RX ADMIN — ALBUTEROL SULFATE 2 PUFF: 90 AEROSOL, METERED RESPIRATORY (INHALATION) at 14:26

## 2021-01-29 RX ADMIN — HEPARIN SODIUM 5000 UNITS: 10000 INJECTION INTRAVENOUS; SUBCUTANEOUS at 00:31

## 2021-01-29 RX ADMIN — NEPHROCAP 1 MG: 1 CAP ORAL at 21:30

## 2021-01-29 RX ADMIN — DEXTROSE MONOHYDRATE 12.5 G: 25 INJECTION, SOLUTION INTRAVENOUS at 09:33

## 2021-01-29 RX ADMIN — BUMETANIDE 1 MG: 0.25 INJECTION INTRAMUSCULAR; INTRAVENOUS at 18:44

## 2021-01-29 RX ADMIN — PROPOFOL 50 MG: 10 INJECTION, EMULSION INTRAVENOUS at 14:18

## 2021-01-29 RX ADMIN — FENTANYL CITRATE 50 MCG: 50 INJECTION, SOLUTION INTRAMUSCULAR; INTRAVENOUS at 14:44

## 2021-01-29 RX ADMIN — DEXTROSE MONOHYDRATE: 50 INJECTION, SOLUTION INTRAVENOUS at 13:45

## 2021-01-29 RX ADMIN — DEXTROSE MONOHYDRATE 12.5 G: 25 INJECTION, SOLUTION INTRAVENOUS at 08:50

## 2021-01-29 RX ADMIN — HEPARIN SODIUM 5000 UNITS: 10000 INJECTION INTRAVENOUS; SUBCUTANEOUS at 18:47

## 2021-01-29 RX ADMIN — SUGAMMADEX 288 MG: 100 INJECTION, SOLUTION INTRAVENOUS at 15:00

## 2021-01-29 RX ADMIN — PROPOFOL 150 MG: 10 INJECTION, EMULSION INTRAVENOUS at 14:12

## 2021-01-29 RX ADMIN — INSULIN LISPRO 4 UNITS: 100 INJECTION, SOLUTION INTRAVENOUS; SUBCUTANEOUS at 05:29

## 2021-01-29 RX ADMIN — PIPERACILLIN AND TAZOBACTAM 3375 MG: 3; .375 INJECTION, POWDER, LYOPHILIZED, FOR SOLUTION INTRAVENOUS at 21:47

## 2021-01-29 RX ADMIN — FENTANYL CITRATE 50 MCG: 50 INJECTION, SOLUTION INTRAMUSCULAR; INTRAVENOUS at 14:35

## 2021-01-29 RX ADMIN — HYDRALAZINE HYDROCHLORIDE 10 MG: 20 INJECTION INTRAMUSCULAR; INTRAVENOUS at 18:47

## 2021-01-29 RX ADMIN — LIDOCAINE HYDROCHLORIDE 40 MG: 20 INJECTION, SOLUTION INTRAVENOUS at 14:12

## 2021-01-29 RX ADMIN — DEXAMETHASONE SODIUM PHOSPHATE 10 MG: 10 INJECTION INTRAMUSCULAR; INTRAVENOUS at 14:33

## 2021-01-29 RX ADMIN — HYDRALAZINE HYDROCHLORIDE 50 MG: 50 TABLET, FILM COATED ORAL at 21:30

## 2021-01-29 RX ADMIN — EPOETIN ALFA-EPBX 2000 UNITS: 2000 INJECTION, SOLUTION INTRAVENOUS; SUBCUTANEOUS at 18:44

## 2021-01-29 RX ADMIN — SODIUM CHLORIDE, PRESERVATIVE FREE 10 ML: 5 INJECTION INTRAVENOUS at 00:32

## 2021-01-29 RX ADMIN — INSULIN LISPRO 2 UNITS: 100 INJECTION, SOLUTION INTRAVENOUS; SUBCUTANEOUS at 19:16

## 2021-01-29 RX ADMIN — INSULIN GLARGINE 16 UNITS: 100 INJECTION, SOLUTION SUBCUTANEOUS at 21:36

## 2021-01-29 ASSESSMENT — PULMONARY FUNCTION TESTS
PIF_VALUE: 40
PIF_VALUE: 34
PIF_VALUE: 39
PIF_VALUE: 41
PIF_VALUE: 38
PIF_VALUE: 42
PIF_VALUE: 40
PIF_VALUE: 28
PIF_VALUE: 41
PIF_VALUE: 39
PIF_VALUE: 39
PIF_VALUE: 41
PIF_VALUE: 9
PIF_VALUE: 39
PIF_VALUE: 0
PIF_VALUE: 17
PIF_VALUE: 41
PIF_VALUE: 40
PIF_VALUE: 0
PIF_VALUE: 38
PIF_VALUE: 2
PIF_VALUE: 38
PIF_VALUE: 1
PIF_VALUE: 0
PIF_VALUE: 33
PIF_VALUE: 1
PIF_VALUE: 38
PIF_VALUE: 16
PIF_VALUE: 38
PIF_VALUE: 38
PIF_VALUE: 41
PIF_VALUE: 1
PIF_VALUE: 39
PIF_VALUE: 41

## 2021-01-29 ASSESSMENT — PAIN DESCRIPTION - PAIN TYPE
TYPE: SURGICAL PAIN
TYPE: SURGICAL PAIN

## 2021-01-29 ASSESSMENT — PAIN DESCRIPTION - DESCRIPTORS
DESCRIPTORS: DISCOMFORT
DESCRIPTORS: DISCOMFORT

## 2021-01-29 ASSESSMENT — PAIN DESCRIPTION - LOCATION: LOCATION: PERINEUM

## 2021-01-29 ASSESSMENT — PAIN SCALES - GENERAL
PAINLEVEL_OUTOF10: 0
PAINLEVEL_OUTOF10: 2
PAINLEVEL_OUTOF10: 0

## 2021-01-29 NOTE — PROGRESS NOTES
Prior to entering OR, according to line room nurse, Carlos Bowens, Dr Pedrito Camargo and Dr. Robbie Vallejo were aware that there is not a current pregnancy test. No orders received and ok to proceed with scheduled procedure.

## 2021-01-29 NOTE — ANESTHESIA PRE PROCEDURE
Department of Anesthesiology  Preprocedure Note       Name:  Gloria Reynolds   Age:  38 y.o.  :  1985                                          MRN:  [id number]         Date:  2021      Surgeon: Ancelmo Peters):  Lennox Larger, MD    Procedure: Procedure(s):  PERINEAL ABCESS INCISION AND DRAINAGE (LITHOTOMY)    Medications prior to admission:   Prior to Admission medications    Medication Sig Start Date End Date Taking? Authorizing Provider   Cholecalciferol (VITAMIN D3) 50 MCG (2000) CAPS Take 2,000 Units by mouth daily   Yes Historical Provider, MD   Insulin Glargine, 2 Unit Dial, (TOUJEO MAX SOLOSTAR) 300 UNIT/ML SOPN Inject 20 Units into the skin nightly   Yes Historical Provider, MD   insulin lispro, 1 Unit Dial, (HUMALOG KWIKPEN) 100 UNIT/ML SOPN Inject 10 Units into the skin 3 times daily With Lunch,Dinner,Snack   Yes Historical Provider, MD   amLODIPine (NORVASC) 5 MG tablet Take 5 mg by mouth daily  3/13/20  Yes Historical Provider, MD   famotidine (PEPCID) 20 MG tablet Take 1 tablet by mouth every morning 20  Yes KEMI Aly CNP   B Complex-C-Folic Acid (YOLANDA-JACOB) TABS Take 1 tablet by mouth Daily with supper 20  Yes KEMI Aly CNP   medroxyPROGESTERone (DEPO-PROVERA) 150 MG/ML injection Inject 150 mg into the muscle every 3 months  19  Yes Historical Provider, MD   albuterol sulfate  (90 Base) MCG/ACT inhaler Inhale 2 puffs into the lungs every 4-6 hours as needed for Wheezing or Shortness of Breath 19  Yes Caro Elmore PA-C   carvedilol (COREG) 25 MG tablet Take 25 mg by mouth 2 times daily  19  Yes Historical Provider, MD       Current medications:    Current Facility-Administered Medications   Medication Dose Route Frequency Provider Last Rate Last Admin    hydrALAZINE (APRESOLINE) tablet 50 mg  50 mg Oral 3 times per day KEMI Jean CNP        bumetanide (BUMEX) injection 1 mg  1 mg Intravenous Q12H Jannie Barbara Valencia MD   1 mg at 01/29/21 4849    vancomycin (VANCOCIN) intermittent dosing (placeholder)   Other RX Placeholder Erich Brownlee MD        hydrALAZINE (APRESOLINE) injection 10 mg  10 mg Intravenous Q6H PRN Lise Rushing DO        epoetin nadya-epbx (RETACRIT) injection 2,000 Units  2,000 Units Subcutaneous Once per day on Mon Wed Fri KEMI Horne - CNP        morphine sulfate (PF) injection 4 mg  4 mg Intravenous Once Maribell May MD        glucose (GLUTOSE) 40 % oral gel 15 g  15 g Oral PRN Maribell May MD        dextrose 50 % IV solution  12.5 g Intravenous PRN Maribell May MD   12.5 g at 01/29/21 0933    glucagon (rDNA) injection 1 mg  1 mg Intramuscular PRN Maribell May MD        dextrose 5 % solution  100 mL/hr Intravenous PRN Maribell May  mL/hr at 01/29/21 0942 100 mL/hr at 01/29/21 0942    albuterol (PROVENTIL) nebulizer solution 2.5 mg  2.5 mg Nebulization Q4H PRN Erich Brownlee MD        b complex-C-folic acid (NEPHROCAPS) capsule 1 mg  1 mg Oral Dinner Erich Brownlee MD   1 mg at 01/28/21 1717    carvedilol (COREG) tablet 25 mg  25 mg Oral BID  Erich Brownlee MD   25 mg at 01/28/21 2135    vitamin D (CHOLECALCIFEROL) tablet 2,000 Units  2,000 Units Oral Daily Erich Brownlee MD   2,000 Units at 01/28/21 1204    famotidine (PEPCID) tablet 20 mg  20 mg Oral QAM Erich Brownlee MD   20 mg at 01/28/21 1204    insulin glargine (LANTUS) injection vial 16 Units  16 Units Subcutaneous Nightly Erich Brownlee MD   16 Units at 01/28/21 2303    insulin lispro (HUMALOG) injection vial 10 Units  10 Units Subcutaneous TID  Erich Brownlee MD   10 Units at 01/28/21 1720    insulin lispro (HUMALOG) injection vial 0-10 Units  0-10 Units Subcutaneous 4x Daily AC & HS Erich Brownlee MD   4 Units at 01/29/21 0529    piperacillin-tazobactam (ZOSYN) 3,375 mg in dextrose 5 % 100 mL IVPB extended infusion (mini-bag)  3,375 mg Intravenous Q12H Jannie Daryn Wagner MD   Stopped at 01/29/21 0103    sodium chloride flush 0.9 % injection 10 mL  10 mL Intravenous 2 times per day Jarod Pacheco MD   10 mL at 01/28/21 2209    sodium chloride flush 0.9 % injection 10 mL  10 mL Intravenous PRN Jarod Pacheco MD   10 mL at 01/29/21 0032    promethazine (PHENERGAN) tablet 12.5 mg  12.5 mg Oral Q6H PRN Jarod Pacheco MD        Or    ondansetron (ZOFRAN) injection 4 mg  4 mg Intravenous Q6H PRN Jarod Pacheco MD        polyethylene glycol (GLYCOLAX) packet 17 g  17 g Oral Daily PRN Jarod Pacheco MD        acetaminophen (TYLENOL) tablet 650 mg  650 mg Oral Q6H PRN Jarod Pacheco MD        Or    acetaminophen (TYLENOL) suppository 650 mg  650 mg Rectal Q6H PRN Jarod Pacheco MD        heparin (porcine) injection 5,000 Units  5,000 Units Subcutaneous Q8H Jarod Pacheco MD   5,000 Units at 01/29/21 0031       Allergies:  No Known Allergies    Problem List:    Patient Active Problem List   Diagnosis Code    ESRD (end stage renal disease) on dialysis ([facility] N18.6, Z99.2    HTN (hypertension), benign I10    Uncontrolled type 1 diabetes mellitus with hyperglycemia (HCC) E10.65    Chronic deep vein thrombosis (DVT) of right upper extremity ([facility]) I82.721    Facial cellulitis L03. 211    Hyperkalemia E87.5    Nasal polyp J33.9    Periorbital cellulitis of right eye L03.213    Periorbital cellulitis L03.213    Vitamin B deficiency, unspecified E53.9    Abscess L02.91    Nasal congestion R09.81    Dry cough R05    Hidradenitis suppurativa L73.2    Symptomatic anemia D64.9    Anemia D64.9    Acute congestive heart failure (HCC) I50.9    ESRD needing dialysis ([facility]) N18.6, Z99.2    Volume overload E87.70    Elevated troponin R77.8    Gall stone K80.20    Non-compliance Z91.19    AMS (altered mental status) R41.82       Past Medical History:        Diagnosis Date    JOLLY (acute kidney injury) ([facility] 4/5/2016    AVF (arteriovenous fistula) Ready to quit: Not Answered  Counseling given: Not Answered  Comment: PPMonth      Vital Signs (Current):   Vitals:    01/29/21 1030 01/29/21 1100 01/29/21 1116 01/29/21 1200   BP: (!) 207/86 (!) 202/81 (!) 205/89 (!) 166/71   Pulse: 95 100 97 97   Resp:    18   Temp:   97.6 °F (36.4 °C) 98 °F (36.7 °C)   TempSrc:    Temporal   SpO2:    100%   Weight:   158 lb 15.2 oz (72.1 kg)    Height:                                                  BP Readings from Last 3 Encounters:   01/29/21 (!) 166/71   08/24/20 110/62   07/14/20 (!) 143/73       NPO Status:                                                                                 BMI:   Wt Readings from Last 3 Encounters:   01/29/21 158 lb 15.2 oz (72.1 kg)   08/24/20 135 lb (61.2 kg)   07/14/20 135 lb (61.2 kg)     Body mass index is 32.1 kg/m². CBC:   Lab Results   Component Value Date    WBC 6.1 01/29/2021    RBC 2.28 01/29/2021    RBC  08/19/2016      RBC           [id number]    transfused   08/21/16  07:02  SCC    RBC  08/19/2016      RBC           [id number]    released     08/23/16  00:54  SCC    HGB 7.3 01/29/2021    HCT 21.9 01/29/2021    MCV 96.1 01/29/2021    RDW 16.9 01/29/2021     01/29/2021       CMP:   Lab Results   Component Value Date     01/27/2021    K 6.0 01/27/2021    CL 96 01/27/2021    CO2 21 01/27/2021    BUN 54 01/27/2021    CREATININE 15.1 01/27/2021    GFRAA 3 01/27/2021    LABGLOM 3 01/27/2021    GLUCOSE 383 01/27/2021    GLUCOSE 279 07/27/2011    PROT 6.8 01/27/2021    CALCIUM 9.0 01/27/2021    BILITOT 0.3 01/27/2021    ALKPHOS 99 01/27/2021    AST 18 01/27/2021    ALT 33 01/27/2021       POC Tests: No results for input(s): POCGLU, POCNA, POCK, POCCL, POCBUN, POCHEMO, POCHCT in the last 72 hours.     Coags:   Lab Results   Component Value Date    PROTIME 12.2 11/26/2018    INR 1.1 11/26/2018    APTT 29.8 11/26/2018       HCG (If Applicable):   Lab Results   Component Value Date PREGTESTUR NEGTATIVE 09/28/2018    HCG 574391.8 (H) 11/18/2012        ABGs: No results found for: PHART, PO2ART, NWT6HHI, CVH3AIX, BEART, V9UVMHBO     Type & Screen (If Applicable):  No results found for: LABABO, LABRH    Drug/Infectious Status (If Applicable):  No results found for: HIV, HEPCAB    COVID-19 Screening (If Applicable):   Lab Results   Component Value Date    COVID19 Not Detected 01/27/2021         Anesthesia Evaluation  Patient summary reviewed and Nursing notes reviewed no history of anesthetic complications:   Airway: Mallampati: IV  TM distance: >3 FB   Neck ROM: full  Mouth opening: > = 3 FB Dental: normal exam     Comment: Patient denies chipped or loose teeth    Patient has a piercing in her lower lip/chin area that she says has been there for 18 years. She said that she doesn't know how to remove it. She will sign a jewelry waiver form    Pulmonary: breath sounds clear to auscultation                            ROS comment: On O2   Cardiovascular:    (+) hypertension:, CHF:,         Rhythm: regular  Rate: normal                    Neuro/Psych:               GI/Hepatic/Renal:   (+) renal disease: ESRD and dialysis,           Endo/Other:    (+) Diabetes, blood dyscrasia: anemia:., electrolyte abnormalities, . Abdominal:           Vascular:   + DVT, . Anesthesia Plan      general     ASA 4       Induction: intravenous. MIPS: Postoperative opioids intended and Prophylactic antiemetics administered. Anesthetic plan and risks discussed with patient. Plan discussed with CRNA and surgical team.            Patient does not have a pregnancy test on the chart. She is adamant that she is not pregnant. She cannot give a urine specimen. Dr Kwaku Jordan said that the patient needs surgery (since she has an abscess), regardless of whether or not she is pregnant. We will avoid giving versed.   The patient will sign a waiver to not get a pregnancy test        Titus Ramirez MD   1/29/2021

## 2021-01-29 NOTE — PROGRESS NOTES
DR. Vikki Dela Cruz NOTIFIED OF GLUCOSCAN 116 POSTOP. /83- NO TREATMENT IN PACU, BACK TO BASELINE.    Humphrey Kaur

## 2021-01-29 NOTE — OP NOTE
achieved with electrocautery. The wound was packed with iodoform gauze and dressed with sterile gauze and an ABD pad, which was secured in place with paper tape. All surgical equipment counts were correct x2.     Dr. Wright Rule was present for the procedure    Electronically signed by Vargas Bender DO on 1/29/2021 at 3:06 PM

## 2021-01-29 NOTE — PROGRESS NOTES
Pharmacy Consultation Note  (Antibiotic Dosing and Monitoring)    Initial consult date: 1/27/21  Consulting physician: Anabelle Del Rio  Drug: Vancomycin  Indication: Thigh/groin abscess    Age/  Gender Height Weight IBW Dosing weight  kg  Allergy Information   35 y.o./female 4' 11\" (149.9 cm) 135 lb (61.2 kg)     Patient must be at least 60 in tall to calculate ideal body weight  78.7  Patient has no known allergies. Other anti-infectives Start date Stop date   Zosyn                      Date  Tmax WBC BUN/CR UOP CrCL  (mL/min) Drug/Dose Time   Given Level(s)   (Time) Comments   1/28 afebrile 7.5 ESRD  HD x 4 hr 0630 to 1030  Vancomycin 1250 mg x 1 1245     1/29 afebrile 6.1   HD X 4 hr 0700 to 1100 No dose  27.3 @0614 random    1/30                              Intake/Output Summary (Last 24 hours) at 1/29/2021 1016  Last data filed at 1/29/2021 1899  Gross per 24 hour   Intake 1130 ml   Output 4200 ml   Net -3070 ml       Average urine output:    Cultures:    Site Date Result   Wound 1/27 Staphylococcus coagulase-negative    Blood 1 1/27    Blood 2 1/27      Recent Labs     01/27/21  1430   BLOODCULT2 24 Hours no growth        Historical Cultures:  Organism   Date Value Ref Range Status   01/27/2021 Corynebacterium species (A)  Preliminary     Recent Labs     01/27/21  1430   BC 24 Hours no growth       Radiology:      Assessment:  · Pharmacy consulted to dose vancomycin for this 38 y.o. female for thigh/groin abscess. · Goal trough = 15-20 mcg/mL. Will follow TRS HD schedule while inpatient. · IHD today X 4 hr. Pre HD random level 27.3. Plan:   No dose today. Dose by levels. · Pharmacist will follow and monitor/adjust dosing as necessary if vancomycin is to continue.       Oliver Aviles, PharmD,  1/29/2021 10:16 AM

## 2021-01-29 NOTE — ANESTHESIA PRE PROCEDURE
Department of Anesthesiology  Preprocedure Note       Name:  Helen Cardozo   Age:  38 y.o.  :  1985                                          MRN:  [id number]         Date:  2021      Surgeon: Ivone Jin):  Lara Boucher MD    Procedure: Procedure(s):  PERINEAL ABCESS INCISION AND DRAINAGE (LITHOTOMY)    Medications prior to admission:   Prior to Admission medications    Medication Sig Start Date End Date Taking? Authorizing Provider   Cholecalciferol (VITAMIN D3) 50 MCG (2000) CAPS Take 2,000 Units by mouth daily   Yes Historical Provider, MD   Insulin Glargine, 2 Unit Dial, (TOUJEO MAX SOLOSTAR) 300 UNIT/ML SOPN Inject 20 Units into the skin nightly   Yes Historical Provider, MD   insulin lispro, 1 Unit Dial, (HUMALOG KWIKPEN) 100 UNIT/ML SOPN Inject 10 Units into the skin 3 times daily With Lunch,Dinner,Snack   Yes Historical Provider, MD   amLODIPine (NORVASC) 5 MG tablet Take 5 mg by mouth daily  3/13/20  Yes Historical Provider, MD   famotidine (PEPCID) 20 MG tablet Take 1 tablet by mouth every morning 20  Yes KEMI Francois CNP   B Complex-C-Folic Acid (YOLANDA-JACOB) TABS Take 1 tablet by mouth Daily with supper 20  Yes KEMI Francois CNP   medroxyPROGESTERone (DEPO-PROVERA) 150 MG/ML injection Inject 150 mg into the muscle every 3 months  19  Yes Historical Provider, MD   albuterol sulfate  (90 Base) MCG/ACT inhaler Inhale 2 puffs into the lungs every 4-6 hours as needed for Wheezing or Shortness of Breath 19  Yes Caro Elmore PA-C   carvedilol (COREG) 25 MG tablet Take 25 mg by mouth 2 times daily  19  Yes Historical Provider, MD       Current medications:    Current Facility-Administered Medications   Medication Dose Route Frequency Provider Last Rate Last Admin    hydrALAZINE (APRESOLINE) tablet 50 mg  50 mg Oral 3 times per day KEMI Taylor CNP  bumetanide (BUMEX) injection 1 mg  1 mg Intravenous Q12H Bk Hays MD   1 mg at 01/29/21 0517    vancomycin (VANCOCIN) intermittent dosing (placeholder)   Other RX Placeholder Bk Hays MD        hydrALAZINE (APRESOLINE) injection 10 mg  10 mg Intravenous Q6H PRN Annetta Sawyer DO        epoetin nadya-epbx (RETACRIT) injection 2,000 Units  2,000 Units Subcutaneous Once per day on Mon Wed Fri KEIM Taylor - CNP        morphine sulfate (PF) injection 4 mg  4 mg Intravenous Once Desiree Butt MD        glucose (GLUTOSE) 40 % oral gel 15 g  15 g Oral PRN Desiree Butt MD        dextrose 50 % IV solution  12.5 g Intravenous PRN Desiree Butt MD   12.5 g at 01/29/21 0933    glucagon (rDNA) injection 1 mg  1 mg Intramuscular PRN Desiree Butt MD        dextrose 5 % solution  100 mL/hr Intravenous PRN Desiree Butt  mL/hr at 01/29/21 0942 100 mL/hr at 01/29/21 0942    albuterol (PROVENTIL) nebulizer solution 2.5 mg  2.5 mg Nebulization Q4H PRN Bk Hays MD        b complex-C-folic acid (NEPHROCAPS) capsule 1 mg  1 mg Oral Dinner Bk Hays MD   1 mg at 01/28/21 1717    carvedilol (COREG) tablet 25 mg  25 mg Oral BID  Bk Hays MD   25 mg at 01/28/21 2135    vitamin D (CHOLECALCIFEROL) tablet 2,000 Units  2,000 Units Oral Daily Bk Hays MD   2,000 Units at 01/28/21 1204    famotidine (PEPCID) tablet 20 mg  20 mg Oral QAM Bk Hays MD   20 mg at 01/28/21 1204    insulin glargine (LANTUS) injection vial 16 Units  16 Units Subcutaneous Nightly Bk Hays MD   16 Units at 01/28/21 2303    insulin lispro (HUMALOG) injection vial 10 Units  10 Units Subcutaneous TID  Bk Hays MD   10 Units at 01/28/21 1720    insulin lispro (HUMALOG) injection vial 0-10 Units  0-10 Units Subcutaneous 4x Daily AC & HS Bk Hays MD   4 Units at 01/29/21 8535  piperacillin-tazobactam (ZOSYN) 3,375 mg in dextrose 5 % 100 mL IVPB extended infusion (mini-bag)  3,375 mg Intravenous Q12H Erich Brownlee MD   Stopped at 01/29/21 0103    sodium chloride flush 0.9 % injection 10 mL  10 mL Intravenous 2 times per day Erich Brownlee MD   10 mL at 01/28/21 2209    sodium chloride flush 0.9 % injection 10 mL  10 mL Intravenous PRN Erich Brownlee MD   10 mL at 01/29/21 0032    promethazine (PHENERGAN) tablet 12.5 mg  12.5 mg Oral Q6H PRN Erich Brownlee MD        Or    ondansetron (ZOFRAN) injection 4 mg  4 mg Intravenous Q6H PRN Erich Brownlee MD        polyethylene glycol (GLYCOLAX) packet 17 g  17 g Oral Daily PRN Erich Brownlee MD        acetaminophen (TYLENOL) tablet 650 mg  650 mg Oral Q6H PRN Erich Brownlee MD        Or    acetaminophen (TYLENOL) suppository 650 mg  650 mg Rectal Q6H PRN Erich Brownlee MD        heparin (porcine) injection 5,000 Units  5,000 Units Subcutaneous Q8H Erich Brownlee MD   5,000 Units at 01/29/21 0031       Allergies:  No Known Allergies    Problem List:    Patient Active Problem List   Diagnosis Code    ESRD (end stage renal disease) on dialysis ([facility]) N18.6, Z99.2    HTN (hypertension), benign I10    Uncontrolled type 1 diabetes mellitus with hyperglycemia ([facility]) E10.65    Chronic deep vein thrombosis (DVT) of right upper extremity ([facility]) I82.721    Facial cellulitis L03. 211    Hyperkalemia E87.5    Nasal polyp J33.9    Periorbital cellulitis of right eye L03.213    Periorbital cellulitis L03.213    Vitamin B deficiency, unspecified E53.9    Abscess L02.91    Nasal congestion R09.81    Dry cough R05    Hidradenitis suppurativa L73.2    Symptomatic anemia D64.9    Anemia D64.9    Acute congestive heart failure (HCC) I50.9    ESRD needing dialysis ([facility]) N18.6, Z99.2    Volume overload E87.70    Elevated troponin R77.8    Gall stone K80.20    Non-compliance Z91.19  AMS (altered mental status) R41.82       Past Medical History:        Diagnosis Date    [facility]    AVF (arteriovenous fistula) ([facility] 2018    let arm    Chronic kidney disease     Dialysis AV fistula malfunction, initial encounter ([facility] 2019    DM type 1 (diabetes mellitus, type 1) ([facility]     Encounter regarding vascular access for dialysis for ESRD ([facility] 2018    ESRD (end stage renal disease) ([facility]     Hemodialysis patient ([facility]     amrita eunice  / graft in left arm    Hypertension     Tobacco abuse 2016       Past Surgical History:        Procedure Laterality Date     SECTION      CYST INCISION AND DRAINAGE  2011    perirectal abscess. [facility].  Dr. Nadeen Morton Left 2019    LEFT ARM FISTULAGRAM, BALLOON ANGIOPLASTY performed by Ana Davila MD at [facility]      FRACTURE SURGERY      fracture right ulnar, left tibia, and pelvis    OTHER SURGICAL HISTORY      open reduction internal fixation left radial shaft    OTHER SURGICAL HISTORY  2016    pericardial window    OTHER SURGICAL HISTORY  2016     r tesio insertion  / remove 2018    OTHER SURGICAL HISTORY Left 2017    insertion a-v fistula left arm    WI ANASTOMOSIS,AV,ANY SITE Left 2018    REVISION AV FISTULA - LEFT ARM performed by Ana Davila MD at [facility] NON-TUNNEL CV CATH N/A 5/10/2018    TESIO CATHETER INSERTION performed by Heidy Menard MD at [address] PRERETINAL MEMBRANE Left 2018    PARS PLANA VITRECTOMY 25 GAUGE, VITREOUS HEMORRHAGE REMOVAL, ENDO LASER, AIR/FLUID  EXCHANGE LEFT EYE performed by Liv Forrest MD at [address] [place] [address] History:    Social History     Tobacco Use    Smoking status: Light Tobacco Smoker     Years: 15.00     Types: Cigarettes  Smokeless tobacco: Never Used    Tobacco comment: [facility]   Substance Use Topics    Alcohol use: Not Currently     Alcohol/week: 0.0 standard drinks                                Ready to quit: Not Answered  Counseling given: Not Answered  Comment: [facility]      Vital Signs (Current):   Vitals:    01/29/21 0829 01/29/21 0900 01/29/21 0930 01/29/21 1000   BP: (!) 203/85 (!) 186/92 (!) 193/93 (!) 205/95   Pulse: 93 97 93 94   Resp:       Temp:       TempSrc:       SpO2:       Weight:       Height:                                                  BP Readings from Last 3 Encounters:   01/29/21 (!) 205/95   08/24/20 110/62   07/14/20 (!) 143/73       NPO Status:                                                                                 BMI:   Wt Readings from Last 3 Encounters:   01/29/21 167 lb 12.3 oz (76.1 kg)   08/24/20 135 lb (61.2 kg)   07/14/20 135 lb (61.2 kg)     Body mass index is 33.89 kg/m². CBC:   Lab Results   Component Value Date    WBC 6.1 01/29/2021    RBC 2.28 01/29/2021    RBC  08/19/2016      RBC           [id number]    transfused   08/21/16  07:02  SCC    RBC  08/19/2016      RBC           [id number]    released     08/23/16  00:54  SCC    HGB 7.3 01/29/2021    HCT 21.9 01/29/2021    MCV 96.1 01/29/2021    RDW 16.9 01/29/2021     01/29/2021       CMP:   Lab Results   Component Value Date     01/27/2021    K 6.0 01/27/2021    CL 96 01/27/2021    CO2 21 01/27/2021    BUN 54 01/27/2021    CREATININE 15.1 01/27/2021    GFRAA 3 01/27/2021    LABGLOM 3 01/27/2021    GLUCOSE 383 01/27/2021    GLUCOSE 279 07/27/2011    PROT 6.8 01/27/2021    CALCIUM 9.0 01/27/2021    BILITOT 0.3 01/27/2021    ALKPHOS 99 01/27/2021    AST 18 01/27/2021    ALT 33 01/27/2021       POC Tests: No results for input(s): POCGLU, POCNA, POCK, POCCL, POCBUN, POCHEMO, POCHCT in the last 72 hours.     Coags:   Lab Results   Component Value Date    PROTIME 12.2 11/26/2018    INR 1.1 11/26/2018 APTT 29.8 11/26/2018       HCG (If Applicable):   Lab Results   Component Value Date    PREGTESTUR NEGTATIVE 09/28/2018    HCG 371660.8 (H) 11/18/2012        ABGs: No results found for: PHART, PO2ART, NGA0MIS, DBC5EKK, BEART, B3KWGVTM     Type & Screen (If Applicable):  No results found for: LABABO, LABRH    Drug/Infectious Status (If Applicable):  No results found for: HIV, HEPCAB    COVID-19 Screening (If Applicable):   Lab Results   Component Value Date    COVID19 Not Detected 01/27/2021         Anesthesia Evaluation    Airway:         Dental:          Pulmonary:                              Cardiovascular:                      Neuro/Psych:               GI/Hepatic/Renal:            ROS comment: Obesity. Endo/Other:    (+) Diabetes, .                  Abdominal:           Vascular:                                        Anesthesia Plan        Isaiah Silva MD   1/29/2021

## 2021-01-29 NOTE — PLAN OF CARE
Problem: Falls - Risk of:  Goal: Will remain free from falls  Description: Will remain free from falls  Outcome: Ongoing  Goal: Absence of physical injury  Description: Absence of physical injury  Outcome: Ongoing     Problem: Daily Care:  Goal: Daily care needs are met  Description: Daily care needs are met  Outcome: Ongoing Medical Necessity Clause: This procedure was medically necessary because the lesions that were treated were: Kenalog Preparation: Kenalog Ndc# For Kenalog Only: 8323-9381-94 Expiration Date For Kenalog (Optional): 02.2021 Concentration Of Kenalog Solution Injected (Mg/Ml): 40.0 X Size Of Lesion In Cm (Optional): 0 Lot # For Kenalog (Optional): [id number] Consent: The risks of atrophy were reviewed with the patient. Administered By (Optional): Dr. Romano Include Z78.9 (Other Specified Conditions Influencing Health Status) As An Associated Diagnosis?: No Total Volume (Ccs): 1.0 Detail Level: Detailed Treatment Number (Optional): 1

## 2021-01-29 NOTE — FLOWSHEET NOTE
01/29/21 1116   Vital Signs   BP (!) 205/89   Temp 97.6 °F (36.4 °C)   Pulse 97   Weight 158 lb 15.2 oz (72.1 kg)   Weight Method Bed scale   Percent Weight Change -5.26   Post-Hemodialysis Assessment   Post-Treatment Procedures Blood returned; Access bleeding time < 10 minutes   Machine Disinfection Process Exterior Machine Disinfection   Rinseback Volume (ml) 300 ml   Total Liters Processed (l/min) 90.3 l/min   Dialyzer Clearance Lightly streaked   Duration of Treatment (minutes) 240 minutes   Heparin amount administered during treatment (units) 0 units   Hemodialysis Intake (ml) 300 ml   Hemodialysis Output (ml) 4300 ml   NET Removed (ml) 4000 ml   Tolerated Treatment Good   Patient Response to Treatment tolerated well, blood returned, needles pulled, sites held, stasis achieved, bandaids applied, +thrill, +bruit

## 2021-01-29 NOTE — PROGRESS NOTES
Once in OR 5, lower lip piercing was removed by anesthesia and secured in a bag with pt label and placed in bag of belongings that came with pt. (cell phone and 2 shirts). Tele monitor with belongings.

## 2021-01-29 NOTE — PROGRESS NOTES
Hospitalist Progress Note      SYNOPSIS: Patient admitted on 2021   Patient with end-stage renal disease on peritoneal dialysis. Missed dialysis on several days. Generalized edema. Shortness of breath and cough. Has an abscess in the groin area. SUBJECTIVE:    Patient seen and examined  Records reviewed. Temp (24hrs), Av.6 °F (36.4 °C), Min:97 °F (36.1 °C), Max:98.9 °F (37.2 °C)    DIET: Diet NPO, After Midnight  CODE: Full Code    Intake/Output Summary (Last 24 hours) at 2021 0959  Last data filed at 2021 0193  Gross per 24 hour   Intake 1130 ml   Output 4200 ml   Net -3070 ml       OBJECTIVE:    BP (!) 193/93   Pulse 93   Temp 97.6 °F (36.4 °C)   Resp 16   Ht 4' 11\" (1.499 m)   Wt 167 lb 12.3 oz (76.1 kg)   SpO2 96%   BMI 33.89 kg/m²     General appearance: No apparent distress, appears stated age and cooperative. HEENT:  Conjunctivae/corneas clear. Neck: Supple. No jugular venous distention.    Respiratory: Bilateral rhonchi  Cardiovascular: Regular rate rhythm, normal S1-S2  Abdomen: Soft, nontender, nondistended  Musculoskeletal: Edema  Skin:  No rashes  on visible skin  Neurologic: awake, alert and following commands     ASSESSMENT:  ESRD needing dialysis  Volume overload  Acute on chronic diastolic congestive heart failure  Acute respiratory failure with hypoxia  Thigh/groin abscess  History of hidradenitis suppurativa  Hyperkalemia  Elevated troponin in the setting of ESRD  Poorly controlled type 2 diabetes  Hypertension     PLAN:  Surgery following  I&D today  Follow cultures  Continue Zosyn  Nephrology following  Monitor serum electrolytes  Sliding scale insulin  Bumex 1 mg IV twice daily    Medications:  REVIEWED DAILY    Infusion Medications    dextrose 100 mL/hr (21 0942)     Scheduled Medications    hydrALAZINE  50 mg Oral 3 times per day    bumetanide  1 mg Intravenous Q12H    vancomycin (VANCOCIN) intermittent dosing (placeholder)   Other RX Placeholder    epoetin nadya-epbx  2,000 Units Subcutaneous Once per day on Mon Wed Fri    morphine  4 mg Intravenous Once    b complex-C-folic acid  1 mg Oral Dinner    carvedilol  25 mg Oral BID WC    vitamin D  2,000 Units Oral Daily    famotidine  20 mg Oral QAM    insulin glargine  16 Units Subcutaneous Nightly    insulin lispro  10 Units Subcutaneous TID WC    insulin lispro  0-10 Units Subcutaneous 4x Daily AC & HS    piperacillin-tazobactam  3,375 mg Intravenous Q12H    sodium chloride flush  10 mL Intravenous 2 times per day    heparin (porcine)  5,000 Units Subcutaneous Q8H     PRN Meds: hydrALAZINE, glucose, dextrose, glucagon (rDNA), dextrose, albuterol, sodium chloride flush, promethazine **OR** ondansetron, polyethylene glycol, acetaminophen **OR** acetaminophen    Labs:     Recent Labs     01/27/21  1433 01/29/21  0614   WBC 7.5 6.1   HGB 7.9* 7.3*   HCT 24.1* 21.9*    212       Recent Labs     01/27/21  1433 01/27/21  2152 01/29/21  0614    136  --    K 5.9* 6.0*  --    CL 93* 96*  --    CO2 23 21*  --    BUN 54* 54*  --    CREATININE 14.9* 15.1*  --    CALCIUM 9.2 9.0  --    PHOS  --   --  5.7*       Recent Labs     01/27/21  1433   PROT 6.8   ALKPHOS 99   ALT 33*   AST 18   BILITOT 0.3       No results for input(s): INR in the last 72 hours. Recent Labs     01/27/21  1433   TROPONINI 0.13*       Chronic labs:    Lab Results   Component Value Date    CHOL 155 10/08/2013    TRIG 68 10/08/2013    HDL 40 01/29/2021    LDLCALC 41 01/29/2021    TSH 3.850 01/27/2021    INR 1.1 11/26/2018    LABA1C 9.1 (H) 01/27/2021       Radiology: REVIEWED DAILY    +++++++++++++++++++++++++++++++++++++++++++++++++  [address], [place]  +++++++++++++++++++++++++++++++++++++++++++++++++  NOTE: This report was transcribed using voice recognition software.  Every effort was made to ensure accuracy; however, inadvertent computerized transcription errors may be present.

## 2021-01-29 NOTE — BRIEF OP NOTE
Brief Postoperative Note      Patient: Eddi Weston  YOB: 1985  MRN: [id number]    Date of Procedure: 1/29/2021    Pre-Op Diagnosis: ABCESS    Post-Op Diagnosis: Same       Procedure(s):  PERINEAL ABCESS INCISION AND DRAINAGE (LITHOTOMY)    Surgeon(s):  Levon Yin MD    Assistant:  Resident: Josué Holland MD; Edwar Dorsey DO    Anesthesia: General    Estimated Blood Loss (mL): less than 50     Complications: None    Specimens:   ID Type Source Tests Collected by Time Destination   1 : CULTURE PERINEAL ABSCESS Body Fluid Fluid CULTURE, ANAEROBIC, CULTURE, FUNGUS, GRAM STAIN, CULTURE, SURGICAL, CULTURE WITH SMEAR, ACID FAST BACILLIUS Levon Yin MD 1/29/2021 1432        Implants:  * No implants in log *      Drains: * No LDAs found *    Findings: Perineal abscess    Electronically signed by Swetha Chatterjee DO on 1/29/2021 at 3:05 PM

## 2021-01-29 NOTE — PROGRESS NOTES
Nephrology Progress Note    We are following Mary Kay Ross for ESRD-IHD MGMT      History of Present Ilness:    Mary Kay Ross is a 38 y.o. female with prior history of ESRD dependent on IHD, sec to Type 1 DM requiring RRT via Home Hemodialysis. Pt has been noncompliant and has not had a treatment since last Friday, , she states due to a vaginal abscess which causes it to be too painful to sit up to perform dialysis. Other pmh includes: HTN, CHF anemia, active smoker, history of pericardial effusion s/t window, and history of catheter related infection. She arrived at ER yesterday with c/o sob and wound check, c/o a boil on her left leg that is draining. Significant labs include: Na 132, K+ 5.9, BUN 54, Cr 14.9, eGFR 3, glucose 475, ca+ 9.2, Pro-BNP 31,163, trop 0.13, alb 3.5, HgbA1C 9.1. Vital signs: T 96.9, R 24, P 85, /64, 100% on O2-4L. Upon assessment, pt states she usually does her home hemo 4 times a week, but no specific days and reports she couldn't do dialysis because of the pain she is having in her left groin panty line region, stating she's had this boil since Friday and can not sit up due to the pain. Interval History:  :  Pt seen during hd-tolerating well, UF goal 4300 today. Past Medical History:   Diagnosis Date    JOLLY (acute kidney injury) ([address] 2016    AVF [facility]arteriovenous fistula) [facility] 2018    let arm    Chronic kidney disease     Dialysis AV fistula malfunction, initial encounter ([address] 2019    DM type 1 (diabetes mellitus, type 1) ([address]     Encounter regarding vascular access for dialysis for ESRD ([address] 2018    ESRD (end stage renal disease) ([address]     Hemodialysis patient ([facility]     amrita dawson mon  / graft in left arm    Hypertension     Tobacco abuse 2016       Past Surgical History:   Procedure Laterality Date     SECTION  2013    CYST INCISION AND DRAINAGE  2011    perirectal abscess. Owen Russo Left 11/7/2019    LEFT ARM FISTULAGRAM, BALLOON ANGIOPLASTY performed by Marky Romero MD at [facility] [address]  2009    FRACTURE SURGERY  October 11,2012    fracture right ulnar, left tibia, and pelvis    OTHER SURGICAL HISTORY  1017/12    open reduction internal fixation left radial shaft    OTHER SURGICAL HISTORY  08/19/2016    pericardial window    OTHER SURGICAL HISTORY  08/23/2016     r tesio insertion  / remove 8/16/2018    OTHER SURGICAL HISTORY Left 02/17/2017    insertion a-v fistula left arm    KS ANASTOMOSIS,AV,ANY SITE Left 7/17/2018    REVISION AV FISTULA - LEFT ARM performed by [facility]y Romero MD at [facility] NON-TUNNEL CV CATH N/A 5/10/2018    TESIO CATHETER INSERTION performed by Darci Beltrán MD at [address] [place] [address] Drive MEMBRANE Left 8/28/2018    PARS PLANA VITRECTOMY 25 GAUGE, VITREOUS HEMORRHAGE REMOVAL, ENDO LASER, AIR/FLUID  EXCHANGE LEFT EYE performed by Liya Fagan MD at [facility] OR       Family History   Problem Relation Age of Onset    Stroke Mother     Cancer Father     Diabetes Paternal Aunt         reports that she has been smoking cigarettes. She has smoked for the past 15.00 years. She has never used smokeless tobacco. She reports previous alcohol use. She reports that she does not use drugs. Allergies:  Patient has no known allergies.     Current Medications:        bumetanide (BUMEX) injection 1 mg, Q12H      vancomycin (VANCOCIN) intermittent dosing (placeholder), RX Placeholder      hydrALAZINE (APRESOLINE) injection 10 mg, Q6H PRN      epoetin nadya-epbx (RETACRIT) injection 2,000 Units, Once per day on Mon Wed Fri      morphine sulfate (PF) injection 4 mg, Once      glucose (GLUTOSE) 40 % oral gel 15 g, PRN      dextrose 50 % IV solution, PRN      glucagon (rDNA) injection 1 mg, PRN      dextrose 5 % solution, PRN     RBC  08/19/2016      RBC           [id number]    transfused   08/21/16  07:02  SCC    RBC  08/19/2016      RBC           [id number]    released     08/23/16  00:54  SCC    HGB 7.3 01/29/2021    HCT 21.9 01/29/2021    MCV 96.1 01/29/2021    MCH 32.0 01/29/2021    MCHC 33.3 01/29/2021    RDW 16.9 01/29/2021     01/29/2021    MPV 9.6 01/29/2021     CBC with Differential:    Lab Results   Component Value Date    WBC 6.1 01/29/2021    RBC 2.28 01/29/2021    RBC  08/19/2016      RBC           [id number]    transfused   08/21/16  07:02  SCC    RBC  08/19/2016      RBC           [id number]    released     08/23/16  00:54  SCC    HGB 7.3 01/29/2021    HCT 21.9 01/29/2021     01/29/2021    MCV 96.1 01/29/2021    MCH 32.0 01/29/2021    MCHC 33.3 01/29/2021    RDW 16.9 01/29/2021    SEGSPCT 53 10/08/2013    LYMPHOPCT 23.2 01/29/2021    MONOPCT 7.0 01/29/2021    BASOPCT 0.3 01/29/2021    MONOSABS 0.43 01/29/2021    LYMPHSABS 1.42 01/29/2021    EOSABS 0.33 01/29/2021    BASOSABS 0.02 01/29/2021     CMP:    Lab Results   Component Value Date     01/27/2021    K 6.0 01/27/2021    CL 96 01/27/2021    CO2 21 01/27/2021    BUN 54 01/27/2021    CREATININE 15.1 01/27/2021    GFRAA 3 01/27/2021    LABGLOM 3 01/27/2021    GLUCOSE 383 01/27/2021    GLUCOSE 279 07/27/2011    PROT 6.8 01/27/2021    LABALBU 3.5 01/27/2021    LABALBU 3.9 01/10/2011    CALCIUM 9.0 01/27/2021    BILITOT 0.3 01/27/2021    ALKPHOS 99 01/27/2021    AST 18 01/27/2021    ALT 33 01/27/2021     BMP:    Lab Results   Component Value Date     01/27/2021    K 6.0 01/27/2021    CL 96 01/27/2021    CO2 21 01/27/2021    BUN 54 01/27/2021    LABALBU 3.5 01/27/2021    LABALBU 3.9 01/10/2011    CREATININE 15.1 01/27/2021    CALCIUM 9.0 01/27/2021    GFRAA 3 01/27/2021    LABGLOM 3 01/27/2021    GLUCOSE 383 01/27/2021    GLUCOSE 279 07/27/2011     Ionized Calcium:    Lab Results   Component Value Date    IONCA 1.39 04/30/2013     Magnesium:    Lab Results   Component Value Date    MG 2.5 08/24/2020     Phosphorus:    Lab Results   Component Value Date    PHOS 5.7 01/29/2021     LDH:    Lab Results   Component Value Date     07/14/2020     Uric Acid:  No results found for: LABURIC, URICACID  PT/INR:    Lab Results   Component Value Date    PROTIME 12.2 11/26/2018    INR 1.1 11/26/2018     Warfarin PT/INR:  No components found for: PTPATWAR, PTINRWAR  PTT:    Lab Results   Component Value Date    APTT 29.8 11/26/2018   [APTT}  Troponin:    Lab Results   Component Value Date    TROPONINI 0.13 01/27/2021     Last 3 Troponin:    Lab Results   Component Value Date    TROPONINI 0.13 01/27/2021    TROPONINI 0.04 07/13/2020    TROPONINI <0.01 11/26/2018     U/A:    Lab Results   Component Value Date    COLORU Yellow 11/23/2017    PROTEINU >=300 11/23/2017    PHUR 8.5 11/23/2017    LABCAST FEW  09/29/2013    LABCAST FEW  09/29/2013    WBCUA 2-5 11/23/2017    WBCUA NONE 11/21/2010    RBCUA 5-10 11/23/2017    RBCUA 0-1 09/29/2013    TRICHOMONAS RARE 08/16/2016    BACTERIA RARE 11/23/2017    CLARITYU Clear 11/23/2017    SPECGRAV 1.015 11/23/2017    LEUKOCYTESUR Negative 11/23/2017    UROBILINOGEN 0.2 11/23/2017    BILIRUBINUR Negative 11/23/2017    BILIRUBINUR NEGATIVE 11/21/2010    BLOODU MODERATE 11/23/2017    GLUCOSEU 500 11/23/2017    GLUCOSEU >=1000 11/21/2010     ABG:    Lab Results   Component Value Date    PH 7.467 11/26/2018    PCO2 45.1 11/26/2018    PO2 63.1 11/26/2018    HCO3 31.9 11/26/2018    BE 7.3 11/26/2018    O2SAT 91.8 11/26/2018     HgBA1c:    Lab Results   Component Value Date    LABA1C 9.1 01/27/2021     Microalbumen/Creatinine ratio:  No components found for: RUCREAT  FLP:    Lab Results   Component Value Date    TRIG 68 10/08/2013    HDL 40 01/29/2021    LDLCALC 41 01/29/2021    LABVLDL 7 01/29/2021     TSH:    Lab Results   Component Value Date    TSH 3.850 01/27/2021     VITAMIN B12: No components found for: B12  FOLATE:    Lab Results   Component Value Date    FOLATE 19.4 01/27/2021     IRON:    Lab Results   Component Value Date    IRON 50 01/27/2021     Iron Saturation:  No components found for: PERCENTFE  TIBC:    Lab Results   Component Value Date    TIBC 173 01/27/2021     FERRITIN:    Lab Results   Component Value Date    FERRITIN 613 01/29/2021     RPR:  No results found for: RPR  AMYLASE:    Lab Results   Component Value Date    AMYLASE 83 11/26/2018     LIPASE:    Lab Results   Component Value Date    LIPASE 12 11/26/2018     Urine Toxicology:  No components found for: Philbert Honer, IBENZO, ICOCAINE, IMARTHC, IOPIATES, IPHENCYC     Imaging:  EXAMINATION:   ONE XRAY VIEW OF THE CHEST   1/27/2021 2:46 pm   COMPARISON:   August 24, 2020   HISTORY:   ORDERING SYSTEM PROVIDED HISTORY: sob, CHF   TECHNOLOGIST PROVIDED HISTORY:   Reason for exam:->sob, CHF   What reading provider will be dictating this exam?->CRC   FINDINGS:   The heart is enlarged.  There are bilateral infiltrates.  The costophrenic   angles are mildly blunted.  No pneumothorax.       Impression   CHF.  Superimposed pneumonia cannot be excluded. EXAMINATION:   CT OF THE ABDOMEN AND PELVIS WITH CONTRAST 1/27/2021 5:13 pm   TECHNIQUE:   CT of the abdomen and pelvis was performed with the administration of   intravenous contrast. Multiplanar reformatted images are provided for review. Dose modulation, iterative reconstruction, and/or weight based adjustment of   the mA/kV was utilized to reduce the radiation dose to as low as reasonably   achievable. COMPARISON:   None.    HISTORY:   ORDERING SYSTEM PROVIDED HISTORY: left groin abscess, r/o gas,   TECHNOLOGIST PROVIDED HISTORY:   Additional Contrast?->None   Reason for exam:->left groin abscess, r/o gas,   What reading provider will be dictating this exam?->CRC   FINDINGS:   Lower Chest: Increased interstitial markings and chronic parenchymal changes   at the lung bases.  Small right pleural effusion.  Enlarged subcarinal lymph   node measuring 2.7 x 2.4 cm noted. Organs: No focal liver lesions.  Gallstone versus sludge noted in the   gallbladder.  Mild pericholecystic fluid collection.  Spleen is not enlarged. Pancreas shows normal contour.  Adrenal glands appear unremarkable.  The   kidneys are atrophic bilaterally. GI/Bowel: No obstructing or constricting mass lesions. Pelvis: Incompletely distended bladder with thickening of the bladder wall   could be due to pseudo thickening or cystitis.  Mild-to-moderate ascites. Peritoneum/Retroperitoneum: Ascites.  No free air or significant adenopathy. Bones/Soft Tissues: Diffuse subcutaneous edema.  Orthopedic hardware through   the sacrum and SI joints.       Impression   Mild to moderate ascites and diffuse subcutaneous edema. Severely atrophic kidneys. Gallstones/gallbladder sludge. Parenchymal changes and interstitial infiltrates in the visualized lung bases   with a small right pleural effusion.  Partially visualized enlarged   subcarinal lymph node.  Consider chest CT for complete evaluation of the   chest.       Patient MRN:  [id number]   : 1985   Age: 38 years   Gender: Female   Order Date:  2021   EXAM: US DUP LOWER EXTREMITIES BILATERAL VENOUS   NUMBER OF IMAGES:  40   INDICATION:  DVT    DVT   COMPARISON: None   Within the visualized vessels, there is no evidence for deep venous   thrombosis   There is good compressibility, there is good augmentation, there is   good color flow.        Impression   Within the visualized vessels there is no evidence for deep venous   thrombosis         Assessment & Plan:  1. ESRD  dependent on IHD via Home IHD on 4 days per week with 3 hour treatments using the LUE AVF   Has been noncompliant with treatments-most recent IHD was Friday, . Will follow MWF schedule while inpatient. HD today, UF goal 4300    2.  Htn with CKD 5/ESRD  Above goal <140/90  Volume removal on IHD  Continue home meds: amlodipine 5 mg and carvedilol 25 mg bid, add hydralazine 50 mg Q 8 hrs. Follow    3. DM   uncontrolled as evidenced by HgbA1c 9.1   Blood sugar in   Primary following    4. Anemia in CKD  Hgb below goal >10  Transfuse < 7.0  Iron 50, iron sat 29, TIBC 173, folate 19.4, Vit B-12 784, Ferritin 613  Check stool for OB  Start VALENTIN    5. Sec HPTH of renal origin  PO4 5.7       6.  Left groin abscess  I&D to be done per general surgery    Thank you Dr. Luis Thomas MD for allowing us to participate in care of Luh Marrow, APRN-CNP  8:40 AM  1/29/2021   Pt seen and examined agree with above  For I and d of wound today  Continue daily hd for volume removal  Hydralazine added  Vidhya Santos MD

## 2021-01-30 LAB
ANION GAP SERPL CALCULATED.3IONS-SCNC: 16 MMOL/L (ref 7–16)
BASOPHILS ABSOLUTE: 0.02 E9/L (ref 0–0.2)
BASOPHILS RELATIVE PERCENT: 0.2 % (ref 0–2)
BUN BLDV-MCNC: 21 MG/DL (ref 6–20)
CALCIUM SERPL-MCNC: 8.7 MG/DL (ref 8.6–10.2)
CHLORIDE BLD-SCNC: 92 MMOL/L (ref 98–107)
CO2: 25 MMOL/L (ref 22–29)
CREAT SERPL-MCNC: 6.4 MG/DL (ref 0.5–1)
EOSINOPHILS ABSOLUTE: 0.02 E9/L (ref 0.05–0.5)
EOSINOPHILS RELATIVE PERCENT: 0.2 % (ref 0–6)
GFR AFRICAN AMERICAN: 9
GFR NON-AFRICAN AMERICAN: 9 ML/MIN/1.73
GLUCOSE BLD-MCNC: 646 MG/DL (ref 74–99)
GLUCOSE BLD-MCNC: 736 MG/DL (ref 74–99)
GRAM STAIN ORDERABLE: NORMAL
HCT VFR BLD CALC: 24.2 % (ref 34–48)
HEMOGLOBIN: 7.8 G/DL (ref 11.5–15.5)
IMMATURE GRANULOCYTES #: 0.05 E9/L
IMMATURE GRANULOCYTES %: 0.5 % (ref 0–5)
LYMPHOCYTES ABSOLUTE: 1.1 E9/L (ref 1.5–4)
LYMPHOCYTES RELATIVE PERCENT: 11.4 % (ref 20–42)
MCH RBC QN AUTO: 31.2 PG (ref 26–35)
MCHC RBC AUTO-ENTMCNC: 32.2 % (ref 32–34.5)
MCV RBC AUTO: 96.8 FL (ref 80–99.9)
METER GLUCOSE: 161 MG/DL (ref 74–99)
METER GLUCOSE: 405 MG/DL (ref 74–99)
METER GLUCOSE: 452 MG/DL (ref 74–99)
METER GLUCOSE: >500 MG/DL (ref 74–99)
MONOCYTES ABSOLUTE: 0.63 E9/L (ref 0.1–0.95)
MONOCYTES RELATIVE PERCENT: 6.5 % (ref 2–12)
NEUTROPHILS ABSOLUTE: 7.8 E9/L (ref 1.8–7.3)
NEUTROPHILS RELATIVE PERCENT: 81.2 % (ref 43–80)
PDW BLD-RTO: 16.7 FL (ref 11.5–15)
PLATELET # BLD: 240 E9/L (ref 130–450)
PMV BLD AUTO: 10.5 FL (ref 7–12)
POTASSIUM REFLEX MAGNESIUM: 5 MMOL/L (ref 3.5–5)
RBC # BLD: 2.5 E12/L (ref 3.5–5.5)
SODIUM BLD-SCNC: 133 MMOL/L (ref 132–146)
VANCOMYCIN RANDOM: 17.5 MCG/ML (ref 5–40)
WBC # BLD: 9.6 E9/L (ref 4.5–11.5)

## 2021-01-30 PROCEDURE — 80202 ASSAY OF VANCOMYCIN: CPT

## 2021-01-30 PROCEDURE — 80074 ACUTE HEPATITIS PANEL: CPT

## 2021-01-30 PROCEDURE — 36415 COLL VENOUS BLD VENIPUNCTURE: CPT

## 2021-01-30 PROCEDURE — 6360000002 HC RX W HCPCS: Performed by: STUDENT IN AN ORGANIZED HEALTH CARE EDUCATION/TRAINING PROGRAM

## 2021-01-30 PROCEDURE — 3E1M39Z IRRIGATION OF PERITONEAL CAVITY USING DIALYSATE, PERCUTANEOUS APPROACH: ICD-10-PCS | Performed by: INTERNAL MEDICINE

## 2021-01-30 PROCEDURE — 82962 GLUCOSE BLOOD TEST: CPT

## 2021-01-30 PROCEDURE — 6370000000 HC RX 637 (ALT 250 FOR IP): Performed by: STUDENT IN AN ORGANIZED HEALTH CARE EDUCATION/TRAINING PROGRAM

## 2021-01-30 PROCEDURE — 6360000002 HC RX W HCPCS: Performed by: FAMILY MEDICINE

## 2021-01-30 PROCEDURE — 2580000003 HC RX 258: Performed by: FAMILY MEDICINE

## 2021-01-30 PROCEDURE — 86706 HEP B SURFACE ANTIBODY: CPT

## 2021-01-30 PROCEDURE — 2500000003 HC RX 250 WO HCPCS: Performed by: STUDENT IN AN ORGANIZED HEALTH CARE EDUCATION/TRAINING PROGRAM

## 2021-01-30 PROCEDURE — 6370000000 HC RX 637 (ALT 250 FOR IP): Performed by: FAMILY MEDICINE

## 2021-01-30 PROCEDURE — 2060000000 HC ICU INTERMEDIATE R&B

## 2021-01-30 PROCEDURE — 82947 ASSAY GLUCOSE BLOOD QUANT: CPT

## 2021-01-30 PROCEDURE — 80048 BASIC METABOLIC PNL TOTAL CA: CPT

## 2021-01-30 PROCEDURE — 85025 COMPLETE CBC W/AUTO DIFF WBC: CPT

## 2021-01-30 PROCEDURE — 90945 DIALYSIS ONE EVALUATION: CPT

## 2021-01-30 PROCEDURE — 2580000003 HC RX 258: Performed by: STUDENT IN AN ORGANIZED HEALTH CARE EDUCATION/TRAINING PROGRAM

## 2021-01-30 RX ORDER — AMOXICILLIN AND CLAVULANATE POTASSIUM 875; 125 MG/1; MG/1
1 TABLET, FILM COATED ORAL EVERY 12 HOURS SCHEDULED
Status: DISCONTINUED | OUTPATIENT
Start: 2021-01-30 | End: 2021-02-02 | Stop reason: HOSPADM

## 2021-01-30 RX ORDER — DEXTROSE MONOHYDRATE 50 MG/ML
100 INJECTION, SOLUTION INTRAVENOUS PRN
Status: DISCONTINUED | OUTPATIENT
Start: 2021-01-30 | End: 2021-02-02 | Stop reason: HOSPADM

## 2021-01-30 RX ORDER — HYDRALAZINE HYDROCHLORIDE 50 MG/1
50 TABLET, FILM COATED ORAL EVERY 8 HOURS SCHEDULED
Qty: 90 TABLET | Refills: 3 | Status: SHIPPED | OUTPATIENT
Start: 2021-01-30

## 2021-01-30 RX ORDER — DEXTROSE MONOHYDRATE 25 G/50ML
12.5 INJECTION, SOLUTION INTRAVENOUS PRN
Status: DISCONTINUED | OUTPATIENT
Start: 2021-01-30 | End: 2021-02-02 | Stop reason: HOSPADM

## 2021-01-30 RX ORDER — NICOTINE POLACRILEX 4 MG
15 LOZENGE BUCCAL PRN
Status: DISCONTINUED | OUTPATIENT
Start: 2021-01-30 | End: 2021-02-02 | Stop reason: HOSPADM

## 2021-01-30 RX ORDER — INSULIN GLARGINE 100 [IU]/ML
0.25 INJECTION, SOLUTION SUBCUTANEOUS NIGHTLY
Status: DISCONTINUED | OUTPATIENT
Start: 2021-01-30 | End: 2021-02-02 | Stop reason: HOSPADM

## 2021-01-30 RX ORDER — AMOXICILLIN AND CLAVULANATE POTASSIUM 875; 125 MG/1; MG/1
1 TABLET, FILM COATED ORAL EVERY 12 HOURS SCHEDULED
Qty: 20 TABLET | Refills: 0 | Status: SHIPPED | OUTPATIENT
Start: 2021-01-30 | End: 2021-02-09

## 2021-01-30 RX ORDER — GUAIFENESIN/DEXTROMETHORPHAN 100-10MG/5
5 SYRUP ORAL EVERY 4 HOURS PRN
Status: DISCONTINUED | OUTPATIENT
Start: 2021-01-30 | End: 2021-02-02 | Stop reason: HOSPADM

## 2021-01-30 RX ADMIN — Medication 10 ML: at 11:53

## 2021-01-30 RX ADMIN — HYDROCODONE BITARTRATE AND ACETAMINOPHEN 1 TABLET: 5; 325 TABLET ORAL at 20:45

## 2021-01-30 RX ADMIN — INSULIN LISPRO 10 UNITS: 100 INJECTION, SOLUTION INTRAVENOUS; SUBCUTANEOUS at 07:13

## 2021-01-30 RX ADMIN — FAMOTIDINE 20 MG: 20 TABLET ORAL at 11:52

## 2021-01-30 RX ADMIN — GUAIFENESIN AND DEXTROMETHORPHAN 5 ML: 100; 10 SYRUP ORAL at 23:00

## 2021-01-30 RX ADMIN — BUMETANIDE 1 MG: 0.25 INJECTION INTRAMUSCULAR; INTRAVENOUS at 16:53

## 2021-01-30 RX ADMIN — GUAIFENESIN AND DEXTROMETHORPHAN 5 ML: 100; 10 SYRUP ORAL at 17:49

## 2021-01-30 RX ADMIN — INSULIN GLARGINE 18 UNITS: 100 INJECTION, SOLUTION SUBCUTANEOUS at 20:47

## 2021-01-30 RX ADMIN — HYDRALAZINE HYDROCHLORIDE 50 MG: 50 TABLET, FILM COATED ORAL at 07:09

## 2021-01-30 RX ADMIN — VANCOMYCIN HYDROCHLORIDE 750 MG: 10 INJECTION, POWDER, LYOPHILIZED, FOR SOLUTION INTRAVENOUS at 17:49

## 2021-01-30 RX ADMIN — HYDRALAZINE HYDROCHLORIDE 50 MG: 50 TABLET, FILM COATED ORAL at 13:53

## 2021-01-30 RX ADMIN — AMOXICILLIN AND CLAVULANATE POTASSIUM 1 TABLET: 875; 125 TABLET, FILM COATED ORAL at 20:45

## 2021-01-30 RX ADMIN — AMOXICILLIN AND CLAVULANATE POTASSIUM 1 TABLET: 875; 125 TABLET, FILM COATED ORAL at 11:52

## 2021-01-30 RX ADMIN — Medication 2000 UNITS: at 11:51

## 2021-01-30 RX ADMIN — HEPARIN SODIUM 5000 UNITS: 10000 INJECTION INTRAVENOUS; SUBCUTANEOUS at 16:53

## 2021-01-30 RX ADMIN — HEPARIN SODIUM 5000 UNITS: 10000 INJECTION INTRAVENOUS; SUBCUTANEOUS at 01:00

## 2021-01-30 RX ADMIN — NEPHROCAP 1 MG: 1 CAP ORAL at 16:52

## 2021-01-30 RX ADMIN — CARVEDILOL 25 MG: 25 TABLET, FILM COATED ORAL at 11:51

## 2021-01-30 RX ADMIN — INSULIN LISPRO 18 UNITS: 100 INJECTION, SOLUTION INTRAVENOUS; SUBCUTANEOUS at 11:52

## 2021-01-30 RX ADMIN — BUMETANIDE 1 MG: 0.25 INJECTION INTRAMUSCULAR; INTRAVENOUS at 06:31

## 2021-01-30 RX ADMIN — Medication 10 ML: at 20:47

## 2021-01-30 RX ADMIN — CARVEDILOL 25 MG: 25 TABLET, FILM COATED ORAL at 16:52

## 2021-01-30 RX ADMIN — INSULIN LISPRO 18 UNITS: 100 INJECTION, SOLUTION INTRAVENOUS; SUBCUTANEOUS at 16:52

## 2021-01-30 RX ADMIN — HEPARIN SODIUM 5000 UNITS: 10000 INJECTION INTRAVENOUS; SUBCUTANEOUS at 11:52

## 2021-01-30 RX ADMIN — INSULIN LISPRO 2 UNITS: 100 INJECTION, SOLUTION INTRAVENOUS; SUBCUTANEOUS at 20:45

## 2021-01-30 ASSESSMENT — PAIN SCALES - GENERAL
PAINLEVEL_OUTOF10: 9
PAINLEVEL_OUTOF10: 0
PAINLEVEL_OUTOF10: 0

## 2021-01-30 NOTE — PROGRESS NOTES
Reviewed case with pharmacy intern/resident, agree with recommendations. Thank you for this consult, please call with questions. Bella Oconnor, PharmD 2021 4:33 PM  Phone: [phone]         Pharmacy Consultation Note  (Antibiotic Dosing and Monitoring)    Initial consult date: 21  Consulting physician: Dr. Karyna Fernandez  Drug(s): Vancomycin  Indication: Thigh/Groin Abscess    Ht Readings from Last 1 Encounters:   21 4' 11\" (1.499 m)       Age/Gender IBW DW  Allergy Information   38 y.o.  female Under 60 inches in hieght 78.7 kg  Patient has no known allergies. Date  WBC BUN/sCr UOP (mL/kg/hr) Drug/Dose Time   Given Level(s)   (Time) Comments      7.5 ESRD HD Vancomycin 1250 mg Once 1245 --       6.1 -- -- XX Held XX -- Random level @0614 27.3       9.6 -- -- Vancomycin 750 mg IV x1 <1630> 17.5 mcg/ml @ 0725      (#4)            (#5)            (#6)            Other Antibiotics/Antifungals/Antivirals Start Date Stop Date Comments   Zosyn 3.375 mg IVPB Q12hr 21    Augmentin 875-125 mg Tablet twice a day 21                     Estimated CrCL: ESRD/HD      Intake/Output Summary (Last 24 hours) at 2021 1526  Last data filed at 2021 1028  Gross per 24 hour   Intake 320 ml   Output 3300 ml   Net -2980 ml       Temp max: Temp (24hrs), Av.7 °F (36.5 °C), Min:97.1 °F (36.2 °C), Max:98.1 °F (36.7 °C)      Cultures:    Culture Date Result    Blood Culture  21 24 hr no growth   Wound culture 21 Corynebacterium Species    Surgical culture (abscess) 21 No growth     COVID-19 21 Negative       Assessment:  · 38 yoF with ESRD, admitted on SOB, wound check and draining boil on left leg.   · Abscess drained on   · Dialysis on TUE, THR, SAT; received IHD on   · Consulted by Dr. Karyna Fernandez to dose/monitor vancomycin  · Given Vancomycin 1250 mg IV Once on  @1245  · Random level this AM was 17.5 mcg/ml, undergoing HD today      Plan:  · Give Vancomycin 750 mg IV Once  · Get random level with AM labs on Monday  · Pharmacist will follow and monitor/adjust dosing as necessary    Thank you for this consult, please page with questions.     Verena Frausto PharmD Candidate 1/30/2021 3:26 PM

## 2021-01-30 NOTE — PLAN OF CARE
Problem: Falls - Risk of:  Goal: Will remain free from falls  Description: Will remain free from falls  1/30/2021 0041 by Kosta Ayala RN  Outcome: Met This Shift     Problem: Falls - Risk of:  Goal: Absence of physical injury  Description: Absence of physical injury  1/30/2021 0041 by Kosta Ayala RN  Outcome: Met This Shift     Problem: Daily Care:  Goal: Daily care needs are met  Description: Daily care needs are met  1/30/2021 0041 by Kosta Ayala RN  Outcome: Met This Shift     Problem: Skin Integrity:  Goal: Will show no infection signs and symptoms  Description: Will show no infection signs and symptoms  Outcome: Met This Shift     Problem: Skin Integrity:  Goal: Absence of new skin breakdown  Description: Absence of new skin breakdown  Outcome: Met This Shift

## 2021-01-30 NOTE — PROGRESS NOTES
Nephrology Progress Note    We are following Minna Wren for ESRD-IHD MGMT      History of Present Ilness:    Minna Wren is a 38 y.o. female with prior history of ESRD dependent on IHD, sec to Type 1 DM requiring RRT via Home Hemodialysis. Pt has been noncompliant and has not had a treatment since last Friday, , she states due to a vaginal abscess which causes it to be too painful to sit up to perform dialysis. Other pmh includes: HTN, CHF anemia, active smoker, history of pericardial effusion s/t window, and history of catheter related infection. She arrived at ER yesterday with c/o sob and wound check, c/o a boil on her left leg that is draining. Significant labs include: Na 132, K+ 5.9, BUN 54, Cr 14.9, eGFR 3, glucose 475, ca+ 9.2, Pro-BNP 31,163, trop 0.13, alb 3.5, HgbA1C 9.1. Vital signs: T 96.9, R 24, P 85, /64, 100% on O2-4L. Upon assessment, pt states she usually does her home hemo 4 times a week, but no specific days and reports she couldn't do dialysis because of the pain she is having in her left groin panty line region, stating she's had this boil since Friday and can not sit up due to the pain. Interval History:  :  Pt seen during hd-tolerating well, UF goal 4300 today.   : pt sp hd today with 3 L off, sp I and d yesterday of perineal abscess      Past Medical History:   Diagnosis Date    JOLLY (acute kidney injury) ([address] 2016    AVF (arteriovenous fistula) ([address] 2018    let arm    Chronic kidney disease     Dialysis AV fistula malfunction, initial encounter ([address] 2019    DM type 1 (diabetes mellitus, type 1) ([address]     Encounter regarding vascular access for dialysis for ESRD ([address] 2018    ESRD (end stage renal disease) ([address]     Hemodialysis patient ([address]     amrita dawson  / graft in left arm    Hypertension     Tobacco abuse 2016       Past Surgical History:   Procedure Laterality Date     SECTION  2013    CYST INCISION AND DRAINAGE  8/4/2011    perirectal abscess. [facility]. Dr. Noemi Perez Left 11/7/2019    LEFT ARM FISTULAGRAM, BALLOON ANGIOPLASTY performed by Berlin Roland MD at [address]  2009    FRACTURE SURGERY  October 11,2012    fracture right ulnar, left tibia, and pelvis    OTHER SURGICAL HISTORY  1017/12    open reduction internal fixation left radial shaft    OTHER SURGICAL HISTORY  08/19/2016    pericardial window    OTHER SURGICAL HISTORY  08/23/2016     r tesio insertion  / remove 8/16/2018    OTHER SURGICAL HISTORY Left 02/17/2017    insertion a-v fistula left arm    FL ANASTOMOSIS,AV,ANY SITE Left 7/17/2018    REVISION AV FISTULA - LEFT ARM performed by Berlin Roland MD at [facility] NON-TUNNEL CV CATH N/A 5/10/2018    TESIO CATHETER INSERTION performed by Poonam Parra MD at [address] [place] [address] [address] MEMBRANE Left 8/28/2018    PARS PLANA VITRECTOMY 25 GAUGE, VITREOUS HEMORRHAGE REMOVAL, ENDO LASER, AIR/FLUID  EXCHANGE LEFT EYE performed by Mark Cooper MD at [facility] OR       Family History   Problem Relation Age of Onset    Stroke Mother     Cancer Father     Diabetes Paternal Aunt         reports that she has been smoking cigarettes. She has smoked for the past 15.00 years. She has never used smokeless tobacco. She reports previous alcohol use. She reports that she does not use drugs. Allergies:  Patient has no known allergies.     Current Medications:        amoxicillin-clavulanate (AUGMENTIN) 875-125 MG per tablet 1 tablet, 2 times per day      glucose (GLUTOSE) 40 % oral gel 15 g, PRN      dextrose 50 % IV solution, PRN      glucagon (rDNA) injection 1 mg, PRN      dextrose 5 % solution, PRN      insulin glargine (LANTUS) injection vial 18 Units, Nightly      insulin lispro (HUMALOG) injection vial 0-18 Units, TID WC      insulin lispro (HUMALOG) injection vial 0-9 Units, Nightly      hydrALAZINE (APRESOLINE) tablet 50 mg, 3 times per day      HYDROcodone-acetaminophen (NORCO) 5-325 MG per tablet 0.5 tablet, Q4H PRN    Or      HYDROcodone-acetaminophen (NORCO) 5-325 MG per tablet 1 tablet, Q4H PRN      bumetanide (BUMEX) injection 1 mg, Q12H      vancomycin (VANCOCIN) intermittent dosing (placeholder), RX Placeholder      hydrALAZINE (APRESOLINE) injection 10 mg, Q6H PRN      epoetin nadya-epbx (RETACRIT) injection 2,000 Units, Once per day on Mon Wed Fri      morphine sulfate (PF) injection 4 mg, Once      glucose (GLUTOSE) 40 % oral gel 15 g, PRN      dextrose 50 % IV solution, PRN      glucagon (rDNA) injection 1 mg, PRN      dextrose 5 % solution, PRN      albuterol (PROVENTIL) nebulizer solution 2.5 mg, Q4H PRN      b complex-C-folic acid (NEPHROCAPS) capsule 1 mg, Dinner      carvedilol (COREG) tablet 25 mg, BID WC      vitamin D (CHOLECALCIFEROL) tablet 2,000 Units, Daily      famotidine (PEPCID) tablet 20 mg, QAM      sodium chloride flush 0.9 % injection 10 mL, 2 times per day      sodium chloride flush 0.9 % injection 10 mL, PRN      promethazine (PHENERGAN) tablet 12.5 mg, Q6H PRN    Or      ondansetron (ZOFRAN) injection 4 mg, Q6H PRN      polyethylene glycol (GLYCOLAX) packet 17 g, Daily PRN      acetaminophen (TYLENOL) tablet 650 mg, Q6H PRN    Or      acetaminophen (TYLENOL) suppository 650 mg, Q6H PRN      heparin (porcine) injection 5,000 Units, Q8H        Review of Systems:   Pertinent items are noted in HPI.     Physical exam:   Constitutional:    Vitals:   VITALS:  /60   Pulse 112   Temp 97.7 °F (36.5 °C)   Resp 16   Ht 4' 11\" (1.499 m)   Wt 158 lb 1.1 oz (71.7 kg)   SpO2 95%   BMI 31.93 kg/m²   24HR INTAKE/OUTPUT:      Intake/Output Summary (Last 24 hours) at 1/30/2021 1237  Last data filed at 1/30/2021 1028  Gross per 24 hour   Intake 920 ml   Output 3325 ml   Net -2405 ml     URINARY CATHETER OUTPUT (Rolle):     Skin: no rash, turgor wnl  Heent:  eomi, mmm  Neck: no bruits or jvd noted  Cardiovascular:  S1, S2 without m/r/g  Respiratory: CTA B without w/r/r  Abdomen:  +bs, soft, nt, nd  Ext: 2-3+ lower extremity edema  Psychiatric: mood and affect appropriate  Musculoskeletal:  Rom, muscular strength intact    Data:   Labs:  CBC:   Lab Results   Component Value Date    WBC 9.6 01/30/2021    RBC 2.50 01/30/2021    RBC  08/19/2016      RBC           [id number]    transfused   08/21/16  07:02  SCC    RBC  08/19/2016      RBC           [id number]    released     08/23/16  00:54  SCC    HGB 7.8 01/30/2021    HCT 24.2 01/30/2021    MCV 96.8 01/30/2021    MCH 31.2 01/30/2021    MCHC 32.2 01/30/2021    RDW 16.7 01/30/2021     01/30/2021    MPV 10.5 01/30/2021     CBC with Differential:    Lab Results   Component Value Date    WBC 9.6 01/30/2021    RBC 2.50 01/30/2021    RBC  08/19/2016      RBC           [id number]    transfused   08/21/16  07:02  SCC    RBC  08/19/2016      RBC           [id number]    released     08/23/16  00:54  SCC    HGB 7.8 01/30/2021    HCT 24.2 01/30/2021     01/30/2021    MCV 96.8 01/30/2021    MCH 31.2 01/30/2021    MCHC 32.2 01/30/2021    RDW 16.7 01/30/2021    SEGSPCT 53 10/08/2013    LYMPHOPCT 11.4 01/30/2021    MONOPCT 6.5 01/30/2021    BASOPCT 0.2 01/30/2021    MONOSABS 0.63 01/30/2021    LYMPHSABS 1.10 01/30/2021    EOSABS 0.02 01/30/2021    BASOSABS 0.02 01/30/2021     CMP:    Lab Results   Component Value Date     01/30/2021    K 5.0 01/30/2021    CL 92 01/30/2021    CO2 25 01/30/2021    BUN 21 01/30/2021    CREATININE 6.4 01/30/2021    GFRAA 9 01/30/2021    LABGLOM 9 01/30/2021    GLUCOSE 646 01/30/2021    GLUCOSE 279 07/27/2011    PROT 6.8 01/27/2021    LABALBU 3.5 01/27/2021    LABALBU 3.9 01/10/2011    CALCIUM 8.7 01/30/2021    BILITOT 0.3 01/27/2021    ALKPHOS 99 01/27/2021    AST 18 01/27/2021    ALT 33 11/26/2018    BE 7.3 11/26/2018    O2SAT 91.8 11/26/2018     HgBA1c:    Lab Results   Component Value Date    LABA1C 9.1 01/27/2021     Microalbumen/Creatinine ratio:  No components found for: RUCREAT  FLP:    Lab Results   Component Value Date    TRIG 68 10/08/2013    HDL 40 01/29/2021    LDLCALC 41 01/29/2021    LABVLDL 7 01/29/2021     TSH:    Lab Results   Component Value Date    TSH 3.850 01/27/2021     VITAMIN B12: No components found for: B12  FOLATE:    Lab Results   Component Value Date    FOLATE 19.4 01/27/2021     IRON:    Lab Results   Component Value Date    IRON 50 01/27/2021     Iron Saturation:  No components found for: PERCENTFE  TIBC:    Lab Results   Component Value Date    TIBC 173 01/27/2021     FERRITIN:    Lab Results   Component Value Date    FERRITIN 613 01/29/2021     RPR:  No results found for: RPR  AMYLASE:    Lab Results   Component Value Date    AMYLASE 83 11/26/2018     LIPASE:    Lab Results   Component Value Date    LIPASE 12 11/26/2018     Urine Toxicology:  No components found for: Mackenzie Handler, IBENZO, ICOCAINE, IMARTHC, IOPIATES, IPHENCYC     Imaging:  EXAMINATION:   ONE XRAY VIEW OF THE CHEST   1/27/2021 2:46 pm   COMPARISON:   August 24, 2020   HISTORY:   ORDERING SYSTEM PROVIDED HISTORY: sob, CHF   TECHNOLOGIST PROVIDED HISTORY:   Reason for exam:->sob, CHF   What reading provider will be dictating this exam?->CRC   FINDINGS:   The heart is enlarged.  There are bilateral infiltrates.  The costophrenic   angles are mildly blunted.  No pneumothorax.       Impression   CHF.  Superimposed pneumonia cannot be excluded. EXAMINATION:   CT OF THE ABDOMEN AND PELVIS WITH CONTRAST 1/27/2021 5:13 pm   TECHNIQUE:   CT of the abdomen and pelvis was performed with the administration of   intravenous contrast. Multiplanar reformatted images are provided for review.    Dose modulation, iterative reconstruction, and/or weight based adjustment of   the mA/kV was utilized to reduce the radiation dose to as low as reasonably   achievable. COMPARISON:   None. HISTORY:   ORDERING SYSTEM PROVIDED HISTORY: left groin abscess, r/o gas,   TECHNOLOGIST PROVIDED HISTORY:   Additional Contrast?->None   Reason for exam:->left groin abscess, r/o gas,   What reading provider will be dictating this exam?->CRC   FINDINGS:   Lower Chest: Increased interstitial markings and chronic parenchymal changes   at the lung bases.  Small right pleural effusion.  Enlarged subcarinal lymph   node measuring 2.7 x 2.4 cm noted. Organs: No focal liver lesions.  Gallstone versus sludge noted in the   gallbladder.  Mild pericholecystic fluid collection.  Spleen is not enlarged. Pancreas shows normal contour.  Adrenal glands appear unremarkable.  The   kidneys are atrophic bilaterally. GI/Bowel: No obstructing or constricting mass lesions. Pelvis: Incompletely distended bladder with thickening of the bladder wall   could be due to pseudo thickening or cystitis.  Mild-to-moderate ascites. Peritoneum/Retroperitoneum: Ascites.  No free air or significant adenopathy. Bones/Soft Tissues: Diffuse subcutaneous edema.  Orthopedic hardware through   the sacrum and SI joints.       Impression   Mild to moderate ascites and diffuse subcutaneous edema. Severely atrophic kidneys. Gallstones/gallbladder sludge.    Parenchymal changes and interstitial infiltrates in the visualized lung bases   with a small right pleural effusion.  Partially visualized enlarged   subcarinal lymph node.  Consider chest CT for complete evaluation of the   chest.       Patient MRN:  [id number]   : 1985   Age: 38 years   Gender: Female   Order Date:  2021   EXAM: US DUP LOWER EXTREMITIES BILATERAL VENOUS   NUMBER OF IMAGES:  40   INDICATION:  DVT    DVT   COMPARISON: None   Within the visualized vessels, there is no evidence for deep venous   thrombosis   There is good compressibility, there is good augmentation, there is   good color

## 2021-01-30 NOTE — PLAN OF CARE
Problem: Falls - Risk of:  Goal: Will remain free from falls  Description: Will remain free from falls  Outcome: Met This Shift     Problem: Falls - Risk of:  Goal: Absence of physical injury  Description: Absence of physical injury  Outcome: Met This Shift     Problem: Daily Care:  Goal: Daily care needs are met  Description: Daily care needs are met  Outcome: Met This Shift     Problem: Skin Integrity:  Goal: Will show no infection signs and symptoms  Description: Will show no infection signs and symptoms  Outcome: Met This Shift     Problem: Skin Integrity:  Goal: Absence of new skin breakdown  Description: Absence of new skin breakdown  Outcome: Met This Shift

## 2021-01-30 NOTE — PROGRESS NOTES
Hospitalist Progress Note      SYNOPSIS: Patient admitted on 2021   Patient with end-stage renal disease on peritoneal dialysis. Missed dialysis on several days. Generalized edema. Shortness of breath and cough. Has an abscess in the groin area. SUBJECTIVE:    Patient seen and examined  Records reviewed. Glucose > 600    Temp (24hrs), Av.6 °F (36.4 °C), Min:97 °F (36.1 °C), Max:98.2 °F (36.8 °C)    DIET: DIET RENAL; Renal  CODE: Full Code    Intake/Output Summary (Last 24 hours) at 2021 1121  Last data filed at 2021 1028  Gross per 24 hour   Intake 920 ml   Output 3325 ml   Net -2405 ml       OBJECTIVE:    BP (!) 156/78   Pulse 111   Temp 97.7 °F (36.5 °C)   Resp 16   Ht 4' 11\" (1.499 m)   Wt 158 lb 1.1 oz (71.7 kg)   SpO2 95%   BMI 31.93 kg/m²     General appearance: No apparent distress, appears stated age and cooperative. HEENT:  Conjunctivae/corneas clear. Neck: Supple. No jugular venous distention.    Respiratory: Bilateral rhonchi  Cardiovascular: Regular rate rhythm, normal S1-S2  Abdomen: Soft, nontender, nondistended  Musculoskeletal: Edema  Skin:  No rashes  on visible skin  Neurologic: awake, alert and following commands     ASSESSMENT:  ESRD needing dialysis  Volume overload  Acute on chronic diastolic congestive heart failure  Acute respiratory failure with hypoxia  Thigh/groin abscess  History of hidradenitis suppurativa  Hyperkalemia  Elevated troponin in the setting of ESRD  Poorly controlled type 2 diabetes  Hypertension     PLAN:  Surgery following  I&D today  Follow cultures  Continue Zosyn  Nephrology following  Monitor serum electrolytes  High dose correction insulin  Bumex 1 mg IV twice daily    Medications:  REVIEWED DAILY    Infusion Medications    dextrose      dextrose 100 mL/hr (21 0955)     Scheduled Medications    amoxicillin-clavulanate  1 tablet Oral 2 times per day    insulin glargine  0.25 Units/kg Subcutaneous Nightly    insulin lispro  0-18 Units Subcutaneous TID     insulin lispro  0-9 Units Subcutaneous Nightly    hydrALAZINE  50 mg Oral 3 times per day    bumetanide  1 mg Intravenous Q12H    vancomycin (VANCOCIN) intermittent dosing (placeholder)   Other RX Placeholder    epoetin nadya-epbx  2,000 Units Subcutaneous Once per day on Mon Wed Fri    morphine  4 mg Intravenous Once    b complex-C-folic acid  1 mg Oral Dinner    carvedilol  25 mg Oral BID WC    vitamin D  2,000 Units Oral Daily    famotidine  20 mg Oral QAM    sodium chloride flush  10 mL Intravenous 2 times per day    heparin (porcine)  5,000 Units Subcutaneous Q8H     PRN Meds: glucose, dextrose, glucagon (rDNA), dextrose, HYDROcodone 5 mg - acetaminophen **OR** HYDROcodone 5 mg - acetaminophen, hydrALAZINE, glucose, dextrose, glucagon (rDNA), dextrose, albuterol, sodium chloride flush, promethazine **OR** ondansetron, polyethylene glycol, acetaminophen **OR** acetaminophen    Labs:     Recent Labs     01/27/21  1433 01/29/21  0614 01/30/21  0725   WBC 7.5 6.1 9.6   HGB 7.9* 7.3* 7.8*   HCT 24.1* 21.9* 24.2*    212 240       Recent Labs     01/27/21  1433 01/27/21  2152 01/29/21  0614 01/30/21  0725    136  --  133   K 5.9* 6.0*  --  5.0   CL 93* 96*  --  92*   CO2 23 21*  --  25   BUN 54* 54*  --  21*   CREATININE 14.9* 15.1*  --  6.4*   CALCIUM 9.2 9.0  --  8.7   PHOS  --   --  5.7*  --        Recent Labs     01/27/21  1433   PROT 6.8   ALKPHOS 99   ALT 33*   AST 18   BILITOT 0.3       No results for input(s): INR in the last 72 hours.     Recent Labs     01/27/21  1433   TROPONINI 0.13*       Chronic labs:    Lab Results   Component Value Date    CHOL 155 10/08/2013    TRIG 68 10/08/2013    HDL 40 01/29/2021    LDLCALC 41 01/29/2021    TSH 3.850 01/27/2021    INR 1.1 11/26/2018    LABA1C 9.1 (H) 01/27/2021       Radiology: REVIEWED DAILY    +++++++++++++++++++++++++++++++++++++++++++++++++  [address] [place] [address]  +++++++++++++++++++++++++++++++++++++++++++++++++  NOTE: This report was transcribed using voice recognition software. Every effort was made to ensure accuracy; however, inadvertent computerized transcription errors may be present.

## 2021-01-30 NOTE — PROGRESS NOTES
CLINICAL PHARMACY NOTE: MEDS TO [address] [place] [address] Drive Select Patient?: No  Total # of Prescriptions Filled: 2   The following medications were delivered to the patient:  · Hydralazine 50  · Amoxicillin-pot clavul 875-125  Total # of Interventions Completed: 3  Time Spent (min): 30    Additional Documentation:  ,

## 2021-01-31 ENCOUNTER — APPOINTMENT (OUTPATIENT)
Dept: GENERAL RADIOLOGY | Age: 36
DRG: 570 | End: 2021-01-31
Payer: MEDICARE

## 2021-01-31 LAB
ANAEROBIC CULTURE: NORMAL
METER GLUCOSE: 183 MG/DL (ref 74–99)
METER GLUCOSE: 220 MG/DL (ref 74–99)
METER GLUCOSE: 239 MG/DL (ref 74–99)
METER GLUCOSE: 382 MG/DL (ref 74–99)

## 2021-01-31 PROCEDURE — 82962 GLUCOSE BLOOD TEST: CPT

## 2021-01-31 PROCEDURE — 6370000000 HC RX 637 (ALT 250 FOR IP): Performed by: NURSE PRACTITIONER

## 2021-01-31 PROCEDURE — 6370000000 HC RX 637 (ALT 250 FOR IP): Performed by: STUDENT IN AN ORGANIZED HEALTH CARE EDUCATION/TRAINING PROGRAM

## 2021-01-31 PROCEDURE — 6370000000 HC RX 637 (ALT 250 FOR IP): Performed by: FAMILY MEDICINE

## 2021-01-31 PROCEDURE — 2500000003 HC RX 250 WO HCPCS: Performed by: STUDENT IN AN ORGANIZED HEALTH CARE EDUCATION/TRAINING PROGRAM

## 2021-01-31 PROCEDURE — 71045 X-RAY EXAM CHEST 1 VIEW: CPT

## 2021-01-31 PROCEDURE — 6360000002 HC RX W HCPCS: Performed by: STUDENT IN AN ORGANIZED HEALTH CARE EDUCATION/TRAINING PROGRAM

## 2021-01-31 PROCEDURE — 2580000003 HC RX 258: Performed by: STUDENT IN AN ORGANIZED HEALTH CARE EDUCATION/TRAINING PROGRAM

## 2021-01-31 PROCEDURE — 2060000000 HC ICU INTERMEDIATE R&B

## 2021-01-31 RX ORDER — BENZONATATE 100 MG/1
100 CAPSULE ORAL 3 TIMES DAILY PRN
Status: DISCONTINUED | OUTPATIENT
Start: 2021-01-31 | End: 2021-02-02 | Stop reason: HOSPADM

## 2021-01-31 RX ADMIN — HEPARIN SODIUM 5000 UNITS: 10000 INJECTION INTRAVENOUS; SUBCUTANEOUS at 00:28

## 2021-01-31 RX ADMIN — HEPARIN SODIUM 5000 UNITS: 10000 INJECTION INTRAVENOUS; SUBCUTANEOUS at 09:53

## 2021-01-31 RX ADMIN — CARVEDILOL 25 MG: 25 TABLET, FILM COATED ORAL at 09:52

## 2021-01-31 RX ADMIN — INSULIN LISPRO 3 UNITS: 100 INJECTION, SOLUTION INTRAVENOUS; SUBCUTANEOUS at 17:16

## 2021-01-31 RX ADMIN — HEPARIN SODIUM 5000 UNITS: 10000 INJECTION INTRAVENOUS; SUBCUTANEOUS at 17:15

## 2021-01-31 RX ADMIN — CARVEDILOL 25 MG: 25 TABLET, FILM COATED ORAL at 17:14

## 2021-01-31 RX ADMIN — HYDRALAZINE HYDROCHLORIDE 75 MG: 50 TABLET, FILM COATED ORAL at 21:23

## 2021-01-31 RX ADMIN — INSULIN LISPRO 15 UNITS: 100 INJECTION, SOLUTION INTRAVENOUS; SUBCUTANEOUS at 11:44

## 2021-01-31 RX ADMIN — INSULIN LISPRO 3 UNITS: 100 INJECTION, SOLUTION INTRAVENOUS; SUBCUTANEOUS at 21:25

## 2021-01-31 RX ADMIN — INSULIN LISPRO 1 UNITS: 100 INJECTION, SOLUTION INTRAVENOUS; SUBCUTANEOUS at 06:21

## 2021-01-31 RX ADMIN — BUMETANIDE 1 MG: 0.25 INJECTION INTRAMUSCULAR; INTRAVENOUS at 17:15

## 2021-01-31 RX ADMIN — BUMETANIDE 1 MG: 0.25 INJECTION INTRAMUSCULAR; INTRAVENOUS at 06:08

## 2021-01-31 RX ADMIN — Medication 10 ML: at 21:28

## 2021-01-31 RX ADMIN — FAMOTIDINE 20 MG: 20 TABLET ORAL at 09:52

## 2021-01-31 RX ADMIN — Medication 10 ML: at 09:54

## 2021-01-31 RX ADMIN — Medication 2000 UNITS: at 09:52

## 2021-01-31 RX ADMIN — INSULIN GLARGINE 18 UNITS: 100 INJECTION, SOLUTION SUBCUTANEOUS at 21:25

## 2021-01-31 RX ADMIN — HYDRALAZINE HYDROCHLORIDE 75 MG: 50 TABLET, FILM COATED ORAL at 14:12

## 2021-01-31 RX ADMIN — HYDRALAZINE HYDROCHLORIDE 50 MG: 50 TABLET, FILM COATED ORAL at 06:21

## 2021-01-31 RX ADMIN — GUAIFENESIN AND DEXTROMETHORPHAN 5 ML: 100; 10 SYRUP ORAL at 06:21

## 2021-01-31 RX ADMIN — NEPHROCAP 1 MG: 1 CAP ORAL at 17:14

## 2021-01-31 NOTE — PROGRESS NOTES
Pharmacy Consultation Note  (Antibiotic Dosing and Monitoring)    Initial consult date: 21  Consulting physician: Dr. Lyssa Santos  Drug(s): Vancomycin  Indication: Thigh/Groin Abscess    Ht Readings from Last 1 Encounters:   21 4' 11\" (1.499 m)       Age/Gender IBW DW  Allergy Information   38 y.o.  female Under 60 inches in hieght 78.7 kg  Patient has no known allergies. Date  WBC BUN/sCr UOP (mL/kg/hr) Drug/Dose Time   Given Level(s)   (Time) Comments      7.5 ESRD HD Vancomycin 1250 mg Once 1245 --       6.1 -- -- XX Held XX -- Random level @0614 27.3       9.6 -- -- Vancomycin 750 mg IV x1 1749 17.5 mcg/ml @ 0725       -- -- -- XX Hold XX --       (#5)            (#6)            Other Antibiotics/Antifungals/Antivirals Start Date Stop Date Comments   Zosyn 3.375 mg IVPB Q12hr 21    Augmentin 875-125 mg Tablet twice a day 21  Dose on  at 1000 not given because patient/family refused                   Estimated CrCL: ESRD/HD      Intake/Output Summary (Last 24 hours) at 2021 1144  Last data filed at 2021 1038  Gross per 24 hour   Intake 720 ml   Output 0 ml   Net 720 ml       Temp max: Temp (24hrs), Av.5 °F (36.4 °C), Min:96.4 °F (35.8 °C), Max:98.9 °F (37.2 °C)      Cultures:    Culture Date Result    Blood Culture  21 24 hr no growth   Wound culture 21 Corynebacterium Species    Surgical culture (abscess) 21 No growth     COVID-19 21 Negative       Assessment:  · 38 yoF with ESRD, admitted on SOB, wound check and draining boil on left leg. · Abscess drained on   · Dialysis on TUE, THR, SAT; received IHD on   · Consulted by Dr. Lyssa Santos to dose/monitor vancomycin  · Given Vancomycin 1250 mg IV Once on  @1245  · Random level 1/30 at 0725 was 17.5 mcg/ml, underwent HD  at 0630.       Plan:  · Give Vancomycin 750 mg IV Once  · Get random level with AM labs on Monday  · Pharmacist will follow and

## 2021-01-31 NOTE — ANESTHESIA POSTPROCEDURE EVALUATION
Department of Anesthesiology  Postprocedure Note    Patient: Alhaji Morris  MRN: [id number]  YOB: 1985  Date of evaluation: 1/30/2021  Time:  7:39 PM     Procedure Summary     Date: 01/29/21 Room / Location: [address] [place] [address] / [facility]    Anesthesia Start: 1400 Anesthesia Stop: 9445    Procedure: [address] (LITHOTOMY) (N/A Perineum) Diagnosis: (ABCESS)    Surgeons: Nino Brooks MD Responsible Provider: Titus Ramirez MD    Anesthesia Type: general ASA Status: 4          Anesthesia Type: general    David Phase I: David Score: 9    David Phase II:      Last vitals: Reviewed and per EMR flowsheets.        Anesthesia Post Evaluation    Patient location during evaluation: PACU  Patient participation: complete - patient participated  Level of consciousness: awake and alert  Pain score: 2  Airway patency: patent  Nausea & Vomiting: no vomiting and no nausea  Complications: no  Cardiovascular status: hemodynamically stable  Respiratory status: spontaneous ventilation  Hydration status: stable  Comments: Late entry-- post-op anesthesia visit on 01/29/2021

## 2021-01-31 NOTE — PROGRESS NOTES
Hospitalist Progress Note      SYNOPSIS: Patient admitted on 2021   Patient with end-stage renal disease on peritoneal dialysis. Missed dialysis on several days. Generalized edema. Shortness of breath and cough. Has an abscess in the groin area. SUBJECTIVE:    Patient seen and examined  Records reviewed. Glucose better    Temp (24hrs), Av.4 °F (36.3 °C), Min:96.4 °F (35.8 °C), Max:98.9 °F (37.2 °C)    DIET: DIET RENAL; Renal  CODE: Full Code    Intake/Output Summary (Last 24 hours) at 2021 0942  Last data filed at 2021 0515  Gross per 24 hour   Intake 780 ml   Output 3300 ml   Net -2520 ml       OBJECTIVE:    BP (!) 116/58   Pulse 95   Temp 96.4 °F (35.8 °C) (Temporal)   Resp 17   Ht 4' 11\" (1.499 m)   Wt 161 lb 6.4 oz (73.2 kg)   SpO2 97%   BMI 32.60 kg/m²     General appearance: No apparent distress, appears stated age and cooperative. HEENT:  Conjunctivae/corneas clear. Neck: Supple. No jugular venous distention.    Respiratory: Bilateral rhonchi  Cardiovascular: Regular rate rhythm, normal S1-S2  Abdomen: Soft, nontender, nondistended  Musculoskeletal: Edema  Skin:  No rashes  on visible skin  Neurologic: awake, alert and following commands     ASSESSMENT:  ESRD needing dialysis  Volume overload  Acute on chronic diastolic congestive heart failure  Acute respiratory failure with hypoxia  Thigh/groin abscess  History of hidradenitis suppurativa  Hyperkalemia  Elevated troponin in the setting of ESRD  Poorly controlled type 2 diabetes  Hypertension     PLAN:  Surgery following  Follow cultures  Continue Augmentin  Nephrology following  Monitor serum electrolytes  High dose correction insulin  Bumex 1 mg IV twice daily    Medications:  REVIEWED DAILY    Infusion Medications    dextrose      dextrose 100 mL/hr (21 0942)     Scheduled Medications    amoxicillin-clavulanate  1 tablet Oral 2 times per day    insulin glargine  0.25 Units/kg Subcutaneous Nightly    insulin lispro  0-18 Units Subcutaneous TID     insulin lispro  0-9 Units Subcutaneous Nightly    hydrALAZINE  50 mg Oral 3 times per day    bumetanide  1 mg Intravenous Q12H    vancomycin (VANCOCIN) intermittent dosing (placeholder)   Other RX Placeholder    epoetin nadya-epbx  2,000 Units Subcutaneous Once per day on Mon Wed Fri    morphine  4 mg Intravenous Once    b complex-C-folic acid  1 mg Oral Dinner    carvedilol  25 mg Oral BID WC    vitamin D  2,000 Units Oral Daily    famotidine  20 mg Oral QAM    sodium chloride flush  10 mL Intravenous 2 times per day    heparin (porcine)  5,000 Units Subcutaneous Q8H     PRN Meds: glucose, dextrose, glucagon (rDNA), dextrose, guaiFENesin-dextromethorphan, HYDROcodone 5 mg - acetaminophen **OR** HYDROcodone 5 mg - acetaminophen, hydrALAZINE, glucose, dextrose, glucagon (rDNA), dextrose, albuterol, sodium chloride flush, promethazine **OR** ondansetron, polyethylene glycol, acetaminophen **OR** acetaminophen    Labs:     Recent Labs     01/29/21  0614 01/30/21  0725   WBC 6.1 9.6   HGB 7.3* 7.8*   HCT 21.9* 24.2*    240       Recent Labs     01/29/21  0614 01/30/21  0725   NA  --  133   K  --  5.0   CL  --  92*   CO2  --  25   BUN  --  21*   CREATININE  --  6.4*   CALCIUM  --  8.7   PHOS 5.7*  --        No results for input(s): PROT, ALB, ALKPHOS, ALT, AST, BILITOT, AMYLASE, LIPASE in the last 72 hours. No results for input(s): INR in the last 72 hours. No results for input(s): Monique Jag in the last 72 hours.     Chronic labs:    Lab Results   Component Value Date    CHOL 155 10/08/2013    TRIG 68 10/08/2013    HDL 40 01/29/2021    LDLCALC 41 01/29/2021    TSH 3.850 01/27/2021    INR 1.1 11/26/2018    LABA1C 9.1 (H) 01/27/2021       Radiology: REVIEWED DAILY    +++++++++++++++++++++++++++++++++++++++++++++++++  [address], [place]  +++++++++++++++++++++++++++++++++++++++++++++++++  NOTE: This report was transcribed using voice recognition software. Every effort was made to ensure accuracy; however, inadvertent computerized transcription errors may be present.

## 2021-01-31 NOTE — PLAN OF CARE
Problem: Falls - Risk of:  Goal: Will remain free from falls  Description: Will remain free from falls  1/31/2021 0337 by Brynn Sherman RN  Outcome: Met This Shift     Problem: Falls - Risk of:  Goal: Absence of physical injury  Description: Absence of physical injury  1/31/2021 0337 by Brynn Sherman RN  Outcome: Met This Shift     Problem: Skin Integrity:  Goal: Will show no infection signs and symptoms  Description: Will show no infection signs and symptoms  1/31/2021 0337 by Brynn Sherman RN  Outcome: Met This Shift     Problem: Skin Integrity:  Goal: Absence of new skin breakdown  Description: Absence of new skin breakdown  1/31/2021 0337 by Brynn Sherman RN  Outcome: Met This Shift

## 2021-01-31 NOTE — PROGRESS NOTES
([facility] [address]     amrita dawson  fri / graft in left arm    Hypertension     Tobacco abuse 2016       Past Surgical History:   Procedure Laterality Date     SECTION      CYST INCISION AND DRAINAGE  2011    perirectal abscess. [facility]. Dr. Carly Ramires Left 2019    LEFT ARM FISTULAGRAM, BALLOON ANGIOPLASTY performed by Aye Noyola MD at [facility] [address]      FRACTURE SURGERY      fracture right ulnar, left tibia, and pelvis    OTHER SURGICAL HISTORY  /    open reduction internal fixation left radial shaft    OTHER SURGICAL HISTORY  2016    pericardial window    OTHER SURGICAL HISTORY  2016     r tesio insertion  / remove 2018    OTHER SURGICAL HISTORY Left 2017    insertion a-v fistula left arm    MS ANASTOMOSIS,AV,ANY SITE Left 2018    REVISION AV FISTULA - LEFT ARM performed by Aye Noyola MD at [facility] NON-TUNNEL CV CATH N/A 5/10/2018    TESIO CATHETER INSERTION performed by Angle Kay MD at [address] [place] [address] [address] MEMBRANE Left 2018    PARS PLANA VITRECTOMY 25 GAUGE, VITREOUS HEMORRHAGE REMOVAL, ENDO LASER, AIR/FLUID  EXCHANGE LEFT EYE performed by Guevara Martinez MD at [facility] OR       Family History   Problem Relation Age of Onset    Stroke Mother     Cancer Father     Diabetes Paternal Aunt         reports that she has been smoking cigarettes. She has smoked for the past 15.00 years. She has never used smokeless tobacco. She reports previous alcohol use. She reports that she does not use drugs. Allergies:  Patient has no known allergies.     Current Medications:        benzonatate (TESSALON) capsule 100 mg, TID PRN      amoxicillin-clavulanate (AUGMENTIN) 875-125 MG per tablet 1 tablet, 2 times per day      glucose (GLUTOSE) 40 % oral gel 15 g, PRN      dextrose 50 % IV solution, PRN      glucagon (rDNA) injection 1 mg, PRN      dextrose 5 % solution, PRN      insulin glargine (LANTUS) injection vial 18 Units, Nightly      insulin lispro (HUMALOG) injection vial 0-18 Units, TID WC      insulin lispro (HUMALOG) injection vial 0-9 Units, Nightly      guaiFENesin-dextromethorphan (ROBITUSSIN DM) 100-10 MG/5ML syrup 5 mL, Q4H PRN      hydrALAZINE (APRESOLINE) tablet 50 mg, 3 times per day      HYDROcodone-acetaminophen (NORCO) 5-325 MG per tablet 0.5 tablet, Q4H PRN    Or      HYDROcodone-acetaminophen (NORCO) 5-325 MG per tablet 1 tablet, Q4H PRN      bumetanide (BUMEX) injection 1 mg, Q12H      vancomycin (VANCOCIN) intermittent dosing (placeholder), RX Placeholder      hydrALAZINE (APRESOLINE) injection 10 mg, Q6H PRN      epoetin nadya-epbx (RETACRIT) injection 2,000 Units, Once per day on Mon Wed Fri      morphine sulfate (PF) injection 4 mg, Once      glucose (GLUTOSE) 40 % oral gel 15 g, PRN      dextrose 50 % IV solution, PRN      glucagon (rDNA) injection 1 mg, PRN      dextrose 5 % solution, PRN      albuterol (PROVENTIL) nebulizer solution 2.5 mg, Q4H PRN      b complex-C-folic acid (NEPHROCAPS) capsule 1 mg, Dinner      carvedilol (COREG) tablet 25 mg, BID WC      vitamin D (CHOLECALCIFEROL) tablet 2,000 Units, Daily      famotidine (PEPCID) tablet 20 mg, QAM      sodium chloride flush 0.9 % injection 10 mL, 2 times per day      sodium chloride flush 0.9 % injection 10 mL, PRN      promethazine (PHENERGAN) tablet 12.5 mg, Q6H PRN    Or      ondansetron (ZOFRAN) injection 4 mg, Q6H PRN      polyethylene glycol (GLYCOLAX) packet 17 g, Daily PRN      acetaminophen (TYLENOL) tablet 650 mg, Q6H PRN    Or      acetaminophen (TYLENOL) suppository 650 mg, Q6H PRN      heparin (porcine) injection 5,000 Units, Q8H        Review of Systems:   Pertinent items are noted in HPI.     Physical exam:   Constitutional:    Vitals:   VITALS:  BP (!) 156/67   Pulse 110 Temp 98.3 °F (36.8 °C) (Temporal)   Resp 16   Ht 4' 11\" (1.499 m)   Wt 161 lb 6.4 oz (73.2 kg)   SpO2 91%   BMI 32.60 kg/m²   24HR INTAKE/OUTPUT:      Intake/Output Summary (Last 24 hours) at 1/31/2021 1111  Last data filed at 1/31/2021 1038  Gross per 24 hour   Intake 720 ml   Output 0 ml   Net 720 ml     URINARY CATHETER OUTPUT (Rolle):     Skin: no rash, turgor wnl  Heent:  eomi, mmm  Neck: no bruits or jvd noted  Cardiovascular:  S1, S2 without m/r/g  Respiratory: CTA B without w/r/r  Abdomen:  +bs, soft, nt, nd  Ext: 2-3+ lower extremity edema  Psychiatric: mood and affect appropriate  Musculoskeletal:  Rom, muscular strength intact    Data:   Labs:  CBC:   Lab Results   Component Value Date    WBC 9.6 01/30/2021    RBC 2.50 01/30/2021    RBC  08/19/2016      RBC           [id number]    transfused   08/21/16  07:02  SCC    RBC  08/19/2016      RBC           [id number]    released     08/23/16  00:54  SCC    HGB 7.8 01/30/2021    HCT 24.2 01/30/2021    MCV 96.8 01/30/2021    MCH 31.2 01/30/2021    MCHC 32.2 01/30/2021    RDW 16.7 01/30/2021     01/30/2021    MPV 10.5 01/30/2021     CBC with Differential:    Lab Results   Component Value Date    WBC 9.6 01/30/2021    RBC 2.50 01/30/2021    RBC  08/19/2016      RBC           [id number]    transfused   08/21/16  07:02  SCC    RBC  08/19/2016      RBC           [id number]    released     08/23/16  00:54  SCC    HGB 7.8 01/30/2021    HCT 24.2 01/30/2021     01/30/2021    MCV 96.8 01/30/2021    MCH 31.2 01/30/2021    MCHC 32.2 01/30/2021    RDW 16.7 01/30/2021    SEGSPCT 53 10/08/2013    LYMPHOPCT 11.4 01/30/2021    MONOPCT 6.5 01/30/2021    BASOPCT 0.2 01/30/2021    MONOSABS 0.63 01/30/2021    LYMPHSABS 1.10 01/30/2021    EOSABS 0.02 01/30/2021    BASOSABS 0.02 01/30/2021     CMP:    Lab Results   Component Value Date     01/30/2021    K 5.0 01/30/2021    CL 92 01/30/2021    CO2 25 01/30/2021    BUN 21 01/30/2021    CREATININE 6.4 01/30/2021    GFRAA 9 01/30/2021    LABGLOM 9 01/30/2021    GLUCOSE 646 01/30/2021    GLUCOSE 279 07/27/2011    PROT 6.8 01/27/2021    LABALBU 3.5 01/27/2021    LABALBU 3.9 01/10/2011    CALCIUM 8.7 01/30/2021    BILITOT 0.3 01/27/2021    ALKPHOS 99 01/27/2021    AST 18 01/27/2021    ALT 33 01/27/2021     BMP:    Lab Results   Component Value Date     01/30/2021    K 5.0 01/30/2021    CL 92 01/30/2021    CO2 25 01/30/2021    BUN 21 01/30/2021    LABALBU 3.5 01/27/2021    LABALBU 3.9 01/10/2011    CREATININE 6.4 01/30/2021    CALCIUM 8.7 01/30/2021    GFRAA 9 01/30/2021    LABGLOM 9 01/30/2021    GLUCOSE 646 01/30/2021    GLUCOSE 279 07/27/2011     Ionized Calcium:    Lab Results   Component Value Date    IONCA 1.39 04/30/2013     Magnesium:    Lab Results   Component Value Date    MG 2.5 08/24/2020     Phosphorus:    Lab Results   Component Value Date    PHOS 5.7 01/29/2021     LDH:    Lab Results   Component Value Date     07/14/2020     Uric Acid:  No results found for: LABURIC, URICACID  PT/INR:    Lab Results   Component Value Date    PROTIME 12.2 11/26/2018    INR 1.1 11/26/2018     Warfarin PT/INR:  No components found for: PTPATWAR, PTINRWAR  PTT:    Lab Results   Component Value Date    APTT 29.8 11/26/2018   [APTT}  Troponin:    Lab Results   Component Value Date    TROPONINI 0.13 01/27/2021     Last 3 Troponin:    Lab Results   Component Value Date    TROPONINI 0.13 01/27/2021    TROPONINI 0.04 07/13/2020    TROPONINI <0.01 11/26/2018     U/A:    Lab Results   Component Value Date    COLORU Yellow 11/23/2017    PROTEINU >=300 11/23/2017    PHUR 8.5 11/23/2017    LABCAST FEW  09/29/2013    LABCAST FEW  09/29/2013    WBCUA 2-5 11/23/2017    WBCUA NONE 11/21/2010    RBCUA 5-10 11/23/2017    RBCUA 0-1 09/29/2013    TRICHOMONAS RARE 08/16/2016    BACTERIA RARE 11/23/2017    CLARITYU Clear 11/23/2017    SPECGRAV 1.015 11/23/2017    LEUKOCYTESUR Negative 11/23/2017 UROBILINOGEN 0.2 11/23/2017    BILIRUBINUR Negative 11/23/2017    BILIRUBINUR NEGATIVE 11/21/2010    BLOODU MODERATE 11/23/2017    GLUCOSEU 500 11/23/2017    GLUCOSEU >=1000 11/21/2010     ABG:    Lab Results   Component Value Date    PH 7.467 11/26/2018    PCO2 45.1 11/26/2018    PO2 63.1 11/26/2018    HCO3 31.9 11/26/2018    BE 7.3 11/26/2018    O2SAT 91.8 11/26/2018     HgBA1c:    Lab Results   Component Value Date    LABA1C 9.1 01/27/2021     Microalbumen/Creatinine ratio:  No components found for: RUCREAT  FLP:    Lab Results   Component Value Date    TRIG 68 10/08/2013    HDL 40 01/29/2021    LDLCALC 41 01/29/2021    LABVLDL 7 01/29/2021     TSH:    Lab Results   Component Value Date    TSH 3.850 01/27/2021     VITAMIN B12: No components found for: B12  FOLATE:    Lab Results   Component Value Date    FOLATE 19.4 01/27/2021     IRON:    Lab Results   Component Value Date    IRON 50 01/27/2021     Iron Saturation:  No components found for: PERCENTFE  TIBC:    Lab Results   Component Value Date    TIBC 173 01/27/2021     FERRITIN:    Lab Results   Component Value Date    FERRITIN 613 01/29/2021     RPR:  No results found for: RPR  AMYLASE:    Lab Results   Component Value Date    AMYLASE 83 11/26/2018     LIPASE:    Lab Results   Component Value Date    LIPASE 12 11/26/2018     Urine Toxicology:  No components found for: Sammie Sunshine, IBENZO, ICOCAINE, IMARTHC, IOPIATES, IPHENCYC     Imaging:  EXAMINATION:   ONE XRAY VIEW OF THE CHEST   1/27/2021 2:46 pm   COMPARISON:   August 24, 2020   HISTORY:   ORDERING SYSTEM PROVIDED HISTORY: sob, CHF   TECHNOLOGIST PROVIDED HISTORY:   Reason for exam:->sob, CHF   What reading provider will be dictating this exam?->CRC   FINDINGS:   The heart is enlarged.  There are bilateral infiltrates.  The costophrenic   angles are mildly blunted.  No pneumothorax.       Impression   CHF.  Superimposed pneumonia cannot be excluded.      EXAMINATION:   CT OF THE ABDOMEN AND PELVIS WITH CONTRAST 2021 5:13 pm   TECHNIQUE:   CT of the abdomen and pelvis was performed with the administration of   intravenous contrast. Multiplanar reformatted images are provided for review. Dose modulation, iterative reconstruction, and/or weight based adjustment of   the mA/kV was utilized to reduce the radiation dose to as low as reasonably   achievable. COMPARISON:   None. HISTORY:   ORDERING SYSTEM PROVIDED HISTORY: left groin abscess, r/o gas,   TECHNOLOGIST PROVIDED HISTORY:   Additional Contrast?->None   Reason for exam:->left groin abscess, r/o gas,   What reading provider will be dictating this exam?->CRC   FINDINGS:   Lower Chest: Increased interstitial markings and chronic parenchymal changes   at the lung bases.  Small right pleural effusion.  Enlarged subcarinal lymph   node measuring 2.7 x 2.4 cm noted. Organs: No focal liver lesions.  Gallstone versus sludge noted in the   gallbladder.  Mild pericholecystic fluid collection.  Spleen is not enlarged. Pancreas shows normal contour.  Adrenal glands appear unremarkable.  The   kidneys are atrophic bilaterally. GI/Bowel: No obstructing or constricting mass lesions. Pelvis: Incompletely distended bladder with thickening of the bladder wall   could be due to pseudo thickening or cystitis.  Mild-to-moderate ascites. Peritoneum/Retroperitoneum: Ascites.  No free air or significant adenopathy. Bones/Soft Tissues: Diffuse subcutaneous edema.  Orthopedic hardware through   the sacrum and SI joints.       Impression   Mild to moderate ascites and diffuse subcutaneous edema. Severely atrophic kidneys. Gallstones/gallbladder sludge.    Parenchymal changes and interstitial infiltrates in the visualized lung bases   with a small right pleural effusion.  Partially visualized enlarged   subcarinal lymph node.  Consider chest CT for complete evaluation of the   chest.       Patient MRN:  [id number]   : 1985   Age: 38 years   Gender: Female   Order Date:  1/27/2021   EXAM: US DUP LOWER EXTREMITIES BILATERAL VENOUS   NUMBER OF IMAGES:  40   INDICATION:  DVT    DVT   COMPARISON: None   Within the visualized vessels, there is no evidence for deep venous   thrombosis   There is good compressibility, there is good augmentation, there is   good color flow.        Impression   Within the visualized vessels there is no evidence for deep venous   thrombosis         Assessment & Plan:  1. ESRD  dependent on IHD via Home IHD on 4 days per week with 3 hour treatments using the LUE AVF   Has been noncompliant with treatments-most recent hd as outpt was Friday, 1/22. She has had treatments 3 days in a row for clearance and volume reduction  Will need treatment in the am as well  Needs SW assistance as she has stated she wants to go back to in center HD and does not want PD  Next hd in am    2. Htn with CKD 5/ESRD  Above goal <140/90  Volume removal on IHD  Continue home meds: amlodipine 5 mg and carvedilol 25 mg bid, added hydralazine 50 mg Q 8 hrs 1/29  will increase to 75 mg tid today and follow    3. DM   uncontrolled as evidenced by HgbA1c 9.1   Blood sugar in   Primary following    4. Anemia in CKD  Hgb below goal >10  Transfuse < 7.0  Iron 50, iron sat 29, TIBC 173, folate 19.4, Vit B-12 784, Ferritin 613  Check stool for OB  Start VALENTIN    5. Sec HPTH of renal origin  PO4 5.7       6.  Left groin abscess  I&D drained 1/29    Thank you Dr. Lita Almaraz MD for allowing us to participate in care of DurgaKEMI Huynh - CNP  11:11 AM  1/31/2021     Pt seen and examined agree with above  For hd in am  To resume hd as outpt in center instead of at home  Curry Arango MD

## 2021-02-01 LAB
ANION GAP SERPL CALCULATED.3IONS-SCNC: 21 MMOL/L (ref 7–16)
BASOPHILS ABSOLUTE: 0.02 E9/L (ref 0–0.2)
BASOPHILS RELATIVE PERCENT: 0.3 % (ref 0–2)
BLOOD CULTURE, ROUTINE: NORMAL
BUN BLDV-MCNC: 30 MG/DL (ref 6–20)
CALCIUM SERPL-MCNC: 9.1 MG/DL (ref 8.6–10.2)
CHLORIDE BLD-SCNC: 100 MMOL/L (ref 98–107)
CO2: 21 MMOL/L (ref 22–29)
CREAT SERPL-MCNC: 10.1 MG/DL (ref 0.5–1)
CULTURE SURGICAL: ABNORMAL
CULTURE, BLOOD 2: NORMAL
EOSINOPHILS ABSOLUTE: 0.38 E9/L (ref 0.05–0.5)
EOSINOPHILS RELATIVE PERCENT: 6.2 % (ref 0–6)
GFR AFRICAN AMERICAN: 5
GFR NON-AFRICAN AMERICAN: 5 ML/MIN/1.73
GLUCOSE BLD-MCNC: 202 MG/DL (ref 74–99)
HAV IGM SER IA-ACNC: NORMAL
HBV SURFACE AB TITR SER: NORMAL {TITER}
HCT VFR BLD CALC: 21.6 % (ref 34–48)
HEMOGLOBIN: 7.3 G/DL (ref 11.5–15.5)
HEPATITIS B CORE IGM ANTIBODY: NORMAL
HEPATITIS B SURFACE ANTIGEN INTERPRETATION: NORMAL
HEPATITIS C ANTIBODY INTERPRETATION: NORMAL
IMMATURE GRANULOCYTES #: 0.06 E9/L
IMMATURE GRANULOCYTES %: 1 % (ref 0–5)
LYMPHOCYTES ABSOLUTE: 1.34 E9/L (ref 1.5–4)
LYMPHOCYTES RELATIVE PERCENT: 21.8 % (ref 20–42)
MAGNESIUM: 2.2 MG/DL (ref 1.6–2.6)
MCH RBC QN AUTO: 32.3 PG (ref 26–35)
MCHC RBC AUTO-ENTMCNC: 33.8 % (ref 32–34.5)
MCV RBC AUTO: 95.6 FL (ref 80–99.9)
METER GLUCOSE: 171 MG/DL (ref 74–99)
METER GLUCOSE: 179 MG/DL (ref 74–99)
METER GLUCOSE: 358 MG/DL (ref 74–99)
METER GLUCOSE: 398 MG/DL (ref 74–99)
MONOCYTES ABSOLUTE: 0.71 E9/L (ref 0.1–0.95)
MONOCYTES RELATIVE PERCENT: 11.5 % (ref 2–12)
NEUTROPHILS ABSOLUTE: 3.64 E9/L (ref 1.8–7.3)
NEUTROPHILS RELATIVE PERCENT: 59.2 % (ref 43–80)
ORGANISM: ABNORMAL
PDW BLD-RTO: 17.1 FL (ref 11.5–15)
PHOSPHORUS: 6.6 MG/DL (ref 2.5–4.5)
PLATELET # BLD: 206 E9/L (ref 130–450)
PMV BLD AUTO: 9.6 FL (ref 7–12)
POTASSIUM REFLEX MAGNESIUM: 4.7 MMOL/L (ref 3.5–5)
RBC # BLD: 2.26 E12/L (ref 3.5–5.5)
SODIUM BLD-SCNC: 142 MMOL/L (ref 132–146)
VANCOMYCIN RANDOM: 24 MCG/ML (ref 5–40)
WBC # BLD: 6.2 E9/L (ref 4.5–11.5)

## 2021-02-01 PROCEDURE — 6360000002 HC RX W HCPCS: Performed by: STUDENT IN AN ORGANIZED HEALTH CARE EDUCATION/TRAINING PROGRAM

## 2021-02-01 PROCEDURE — 83735 ASSAY OF MAGNESIUM: CPT

## 2021-02-01 PROCEDURE — 80202 ASSAY OF VANCOMYCIN: CPT

## 2021-02-01 PROCEDURE — 6370000000 HC RX 637 (ALT 250 FOR IP): Performed by: NURSE PRACTITIONER

## 2021-02-01 PROCEDURE — 1200000000 HC SEMI PRIVATE

## 2021-02-01 PROCEDURE — 6360000002 HC RX W HCPCS: Performed by: FAMILY MEDICINE

## 2021-02-01 PROCEDURE — 85025 COMPLETE CBC W/AUTO DIFF WBC: CPT

## 2021-02-01 PROCEDURE — 84100 ASSAY OF PHOSPHORUS: CPT

## 2021-02-01 PROCEDURE — 2580000003 HC RX 258: Performed by: STUDENT IN AN ORGANIZED HEALTH CARE EDUCATION/TRAINING PROGRAM

## 2021-02-01 PROCEDURE — 82962 GLUCOSE BLOOD TEST: CPT

## 2021-02-01 PROCEDURE — 6370000000 HC RX 637 (ALT 250 FOR IP): Performed by: FAMILY MEDICINE

## 2021-02-01 PROCEDURE — 6370000000 HC RX 637 (ALT 250 FOR IP): Performed by: STUDENT IN AN ORGANIZED HEALTH CARE EDUCATION/TRAINING PROGRAM

## 2021-02-01 PROCEDURE — 2580000003 HC RX 258: Performed by: FAMILY MEDICINE

## 2021-02-01 PROCEDURE — 80048 BASIC METABOLIC PNL TOTAL CA: CPT

## 2021-02-01 PROCEDURE — 2500000003 HC RX 250 WO HCPCS: Performed by: STUDENT IN AN ORGANIZED HEALTH CARE EDUCATION/TRAINING PROGRAM

## 2021-02-01 PROCEDURE — 90935 HEMODIALYSIS ONE EVALUATION: CPT | Performed by: INTERNAL MEDICINE

## 2021-02-01 RX ADMIN — EPOETIN ALFA-EPBX 2000 UNITS: 2000 INJECTION, SOLUTION INTRAVENOUS; SUBCUTANEOUS at 08:22

## 2021-02-01 RX ADMIN — INSULIN LISPRO 3 UNITS: 100 INJECTION, SOLUTION INTRAVENOUS; SUBCUTANEOUS at 08:22

## 2021-02-01 RX ADMIN — CARVEDILOL 25 MG: 25 TABLET, FILM COATED ORAL at 18:26

## 2021-02-01 RX ADMIN — INSULIN GLARGINE 18 UNITS: 100 INJECTION, SOLUTION SUBCUTANEOUS at 21:34

## 2021-02-01 RX ADMIN — INSULIN LISPRO 7 UNITS: 100 INJECTION, SOLUTION INTRAVENOUS; SUBCUTANEOUS at 21:34

## 2021-02-01 RX ADMIN — HEPARIN SODIUM 5000 UNITS: 10000 INJECTION INTRAVENOUS; SUBCUTANEOUS at 08:21

## 2021-02-01 RX ADMIN — NEPHROCAP 1 MG: 1 CAP ORAL at 18:26

## 2021-02-01 RX ADMIN — CARVEDILOL 25 MG: 25 TABLET, FILM COATED ORAL at 08:20

## 2021-02-01 RX ADMIN — BUMETANIDE 1 MG: 0.25 INJECTION INTRAMUSCULAR; INTRAVENOUS at 04:54

## 2021-02-01 RX ADMIN — SODIUM CHLORIDE, PRESERVATIVE FREE 10 ML: 5 INJECTION INTRAVENOUS at 18:27

## 2021-02-01 RX ADMIN — Medication 2000 UNITS: at 08:21

## 2021-02-01 RX ADMIN — INSULIN LISPRO 15 UNITS: 100 INJECTION, SOLUTION INTRAVENOUS; SUBCUTANEOUS at 11:25

## 2021-02-01 RX ADMIN — FAMOTIDINE 20 MG: 20 TABLET ORAL at 08:20

## 2021-02-01 RX ADMIN — INSULIN LISPRO 3 UNITS: 100 INJECTION, SOLUTION INTRAVENOUS; SUBCUTANEOUS at 18:25

## 2021-02-01 RX ADMIN — HYDRALAZINE HYDROCHLORIDE 75 MG: 50 TABLET, FILM COATED ORAL at 21:35

## 2021-02-01 RX ADMIN — HYDRALAZINE HYDROCHLORIDE 75 MG: 50 TABLET, FILM COATED ORAL at 04:54

## 2021-02-01 RX ADMIN — VANCOMYCIN HYDROCHLORIDE 500 MG: 500 INJECTION, POWDER, LYOPHILIZED, FOR SOLUTION INTRAVENOUS at 18:30

## 2021-02-01 RX ADMIN — HEPARIN SODIUM 5000 UNITS: 10000 INJECTION INTRAVENOUS; SUBCUTANEOUS at 01:22

## 2021-02-01 RX ADMIN — BENZONATATE 100 MG: 100 CAPSULE ORAL at 08:23

## 2021-02-01 RX ADMIN — BUMETANIDE 1 MG: 0.25 INJECTION INTRAMUSCULAR; INTRAVENOUS at 18:26

## 2021-02-01 RX ADMIN — HEPARIN SODIUM 5000 UNITS: 10000 INJECTION INTRAVENOUS; SUBCUTANEOUS at 18:26

## 2021-02-01 ASSESSMENT — PAIN SCALES - GENERAL
PAINLEVEL_OUTOF10: 0

## 2021-02-01 NOTE — PROGRESS NOTES
Nephrology Progress Note    We are following Robert Winston for ESRD-IHD MGMT      History of Present Ilness:  From 2021 note:  Robert Winston is a 38 y.o. female with prior history of ESRD dependent on IHD, sec to Type 1 DM requiring RRT via Home Hemodialysis. Pt has been noncompliant and has not had a treatment since last Friday, , she states due to a vaginal abscess which causes it to be too painful to sit up to perform dialysis. Other pmh includes: HTN, CHF anemia, active smoker, history of pericardial effusion s/t window, and history of catheter related infection. She arrived at ER yesterday with c/o sob and wound check, c/o a boil on her left leg that is draining. Significant labs include: Na 132, K+ 5.9, BUN 54, Cr 14.9, eGFR 3, glucose 475, ca+ 9.2, Pro-BNP 31,163, trop 0.13, alb 3.5, HgbA1C 9.1. Vital signs: T 96.9, R 24, P 85, /64, 100% on O2-4L. Upon assessment, pt states she usually does her home hemo 4 times a week, but no specific days and reports she couldn't do dialysis because of the pain she is having in her left groin panty line region, stating she's had this boil since Friday and can not sit up due to the pain. Interval History:  21- She is feeling better, less short of breath today.      Past Medical History:   Diagnosis Date    JOLLY (acute kidney injury) ([facility] 2016    AVF (arteriovenous fistula) ([facility] 2018    let arm    Chronic kidney disease     Dialysis AV fistula malfunction, initial encounter ([address] 2019    DM type 1 (diabetes mellitus, type 1) ([address]     Encounter regarding vascular access for dialysis for ESRD ([address] 2018    ESRD (end stage renal disease) ([address]     Hemodialysis patient ([address]     amrita dawson  / graft in left arm    Hypertension     Tobacco abuse 2016       Past Surgical History:   Procedure Laterality Date     SECTION      CYST INCISION AND DRAINAGE  2011 perirectal abscess. [facility]. Dr. Moris Lema Left 11/7/2019    LEFT ARM FISTULAGRAM, BALLOON ANGIOPLASTY performed by Mirian Palacios MD at [address]  2009    FRACTURE SURGERY  October 11,2012    fracture right ulnar, left tibia, and pelvis    OTHER SURGICAL HISTORY  1017/12    open reduction internal fixation left radial shaft    OTHER SURGICAL HISTORY  08/19/2016    pericardial window    OTHER SURGICAL HISTORY  08/23/2016     r tesio insertion  / remove 8/16/2018    OTHER SURGICAL HISTORY Left 02/17/2017    insertion a-v fistula left arm    SD ANASTOMOSIS,AV,ANY SITE Left 7/17/2018    REVISION AV FISTULA - LEFT ARM performed by Mirian Palacios MD at [facility] NON-TUNNEL CV CATH N/A 5/10/2018    TESIO CATHETER INSERTION performed by Jeanna Serna MD at [address] PRERETINAL MEMBRANE Left 8/28/2018    PARS PLANA VITRECTOMY 25 GAUGE, VITREOUS HEMORRHAGE REMOVAL, ENDO LASER, AIR/FLUID  EXCHANGE LEFT EYE performed by Mele Teixeira MD at [address] N/A 1/29/2021    PERINEAL ABCESS INCISION AND DRAINAGE (LITHOTOMY) performed by Razia Cook MD at [address] [place] [address] History   Problem Relation Age of Onset    Stroke Mother     Cancer Father     Diabetes Paternal Aunt         reports that she has been smoking cigarettes. She has smoked for the past 15.00 years. She has never used smokeless tobacco. She reports previous alcohol use. She reports that she does not use drugs. Allergies:  Patient has no known allergies.     Current Medications:        vancomycin (VANCOCIN) 500 mg in dextrose 5 % 100 mL IVPB, Once      benzonatate (TESSALON) capsule 100 mg, TID PRN      hydrALAZINE (APRESOLINE) tablet 75 mg, 3 times per day      amoxicillin-clavulanate (AUGMENTIN) 875-125 MG per tablet 1 tablet, 2 times per day      glucose (GLUTOSE) 40 % oral gel 15 g, PRN     Ht 4' 11\" (1.499 m)   Wt 164 lb 10.9 oz (74.7 kg)   SpO2 93%   BMI 33.26 kg/m²   24HR INTAKE/OUTPUT:      Intake/Output Summary (Last 24 hours) at 2/1/2021 1408  Last data filed at 2/1/2021 0515  Gross per 24 hour   Intake 840 ml   Output 0 ml   Net 840 ml     URINARY CATHETER OUTPUT (Rolle):     Skin: no rash on exposed skin, turgor wnl  Neck: no bruits or jvd noted  Cardiovascular: RRR, no rub  Respiratory: CTA B without w/r/r  Abdomen:  +bs, soft, nt, nd  Ext: 2-3+ lower extremity edema, b/l arm edema, facial edema  Psychiatric: mood and affect appropriate      Data:   Labs:  CBC:   Lab Results   Component Value Date    WBC 6.2 02/01/2021    RBC 2.26 02/01/2021    RBC  08/19/2016      RBC           [id number]    transfused   08/21/16  07:02  SCC    RBC  08/19/2016      RBC           [id number]    released     08/23/16  00:54  SCC    HGB 7.3 02/01/2021    HCT 21.6 02/01/2021    MCV 95.6 02/01/2021    MCH 32.3 02/01/2021    MCHC 33.8 02/01/2021    RDW 17.1 02/01/2021     02/01/2021    MPV 9.6 02/01/2021     CBC with Differential:    Lab Results   Component Value Date    WBC 6.2 02/01/2021    RBC 2.26 02/01/2021    RBC  08/19/2016      RBC           [id number]    transfused   08/21/16  07:02  SCC    RBC  08/19/2016      RBC           [id number]    released     08/23/16  00:54  SCC    HGB 7.3 02/01/2021    HCT 21.6 02/01/2021     02/01/2021    MCV 95.6 02/01/2021    MCH 32.3 02/01/2021    MCHC 33.8 02/01/2021    RDW 17.1 02/01/2021    SEGSPCT 53 10/08/2013    LYMPHOPCT 21.8 02/01/2021    MONOPCT 11.5 02/01/2021    BASOPCT 0.3 02/01/2021    MONOSABS 0.71 02/01/2021    LYMPHSABS 1.34 02/01/2021    EOSABS 0.38 02/01/2021    BASOSABS 0.02 02/01/2021     CMP:    Lab Results   Component Value Date     01/30/2021    K 5.0 01/30/2021    CL 92 01/30/2021    CO2 25 01/30/2021    BUN 21 01/30/2021    CREATININE 6.4 01/30/2021    GFRAA 9 01/30/2021    LABGLOM 9 01/30/2021 GLUCOSE 646 01/30/2021    GLUCOSE 279 07/27/2011    PROT 6.8 01/27/2021    LABALBU 3.5 01/27/2021    LABALBU 3.9 01/10/2011    CALCIUM 8.7 01/30/2021    BILITOT 0.3 01/27/2021    ALKPHOS 99 01/27/2021    AST 18 01/27/2021    ALT 33 01/27/2021     BMP:    Lab Results   Component Value Date     01/30/2021    K 5.0 01/30/2021    CL 92 01/30/2021    CO2 25 01/30/2021    BUN 21 01/30/2021    LABALBU 3.5 01/27/2021    LABALBU 3.9 01/10/2011    CREATININE 6.4 01/30/2021    CALCIUM 8.7 01/30/2021    GFRAA 9 01/30/2021    LABGLOM 9 01/30/2021    GLUCOSE 646 01/30/2021    GLUCOSE 279 07/27/2011     Ionized Calcium:    Lab Results   Component Value Date    IONCA 1.39 04/30/2013     Magnesium:    Lab Results   Component Value Date    MG 2.5 08/24/2020     Phosphorus:    Lab Results   Component Value Date    PHOS 5.7 01/29/2021     LDH:    Lab Results   Component Value Date     07/14/2020     Uric Acid:  No results found for: LABURIC, URICACID  PT/INR:    Lab Results   Component Value Date    PROTIME 12.2 11/26/2018    INR 1.1 11/26/2018     Warfarin PT/INR:  No components found for: PTPATWAR, PTINRWAR  PTT:    Lab Results   Component Value Date    APTT 29.8 11/26/2018   [APTT}  Troponin:    Lab Results   Component Value Date    TROPONINI 0.13 01/27/2021     Last 3 Troponin:    Lab Results   Component Value Date    TROPONINI 0.13 01/27/2021    TROPONINI 0.04 07/13/2020    TROPONINI <0.01 11/26/2018     U/A:    Lab Results   Component Value Date    COLORU Yellow 11/23/2017    PROTEINU >=300 11/23/2017    PHUR 8.5 11/23/2017    LABCAST FEW  09/29/2013    LABCAST FEW  09/29/2013    WBCUA 2-5 11/23/2017    WBCUA NONE 11/21/2010    RBCUA 5-10 11/23/2017    RBCUA 0-1 09/29/2013    TRICHOMONAS RARE 08/16/2016    BACTERIA RARE 11/23/2017    CLARITYU Clear 11/23/2017    SPECGRAV 1.015 11/23/2017    LEUKOCYTESUR Negative 11/23/2017    UROBILINOGEN 0.2 11/23/2017    BILIRUBINUR Negative 11/23/2017    BILIRUBINUR NEGATIVE 11/21/2010    BLOODU MODERATE 11/23/2017    GLUCOSEU 500 11/23/2017    GLUCOSEU >=1000 11/21/2010     ABG:    Lab Results   Component Value Date    PH 7.467 11/26/2018    PCO2 45.1 11/26/2018    PO2 63.1 11/26/2018    HCO3 31.9 11/26/2018    BE 7.3 11/26/2018    O2SAT 91.8 11/26/2018     HgBA1c:    Lab Results   Component Value Date    LABA1C 9.1 01/27/2021     Microalbumen/Creatinine ratio:  No components found for: RUCREAT  FLP:    Lab Results   Component Value Date    TRIG 68 10/08/2013    HDL 40 01/29/2021    LDLCALC 41 01/29/2021    LABVLDL 7 01/29/2021     TSH:    Lab Results   Component Value Date    TSH 3.850 01/27/2021     VITAMIN B12: No components found for: B12  FOLATE:    Lab Results   Component Value Date    FOLATE 19.4 01/27/2021     IRON:    Lab Results   Component Value Date    IRON 50 01/27/2021     Iron Saturation:  No components found for: PERCENTFE  TIBC:    Lab Results   Component Value Date    TIBC 173 01/27/2021     FERRITIN:    Lab Results   Component Value Date    FERRITIN 613 01/29/2021     RPR:  No results found for: RPR  AMYLASE:    Lab Results   Component Value Date    AMYLASE 83 11/26/2018     LIPASE:    Lab Results   Component Value Date    LIPASE 12 11/26/2018     Urine Toxicology:  No components found for: Home [place], IBENZO, ICOCAINE, IMARTHC, IOPIATES, IPHENCYC     Imaging:  EXAMINATION:   ONE XRAY VIEW OF THE CHEST   1/27/2021 2:46 pm   COMPARISON:   August 24, 2020   HISTORY:   ORDERING SYSTEM PROVIDED HISTORY: sob, CHF   TECHNOLOGIST PROVIDED HISTORY:   Reason for exam:->sob, CHF   What reading provider will be dictating this exam?->CRC   FINDINGS:   The heart is enlarged.  There are bilateral infiltrates.  The costophrenic   angles are mildly blunted.  No pneumothorax.       Impression   CHF.  Superimposed pneumonia cannot be excluded.      EXAMINATION:   CT OF THE ABDOMEN AND PELVIS WITH CONTRAST 1/27/2021 5:13 pm   TECHNIQUE:   CT of the abdomen and pelvis was performed with the administration of   intravenous contrast. Multiplanar reformatted images are provided for review. Dose modulation, iterative reconstruction, and/or weight based adjustment of   the mA/kV was utilized to reduce the radiation dose to as low as reasonably   achievable. COMPARISON:   None. HISTORY:   ORDERING SYSTEM PROVIDED HISTORY: left groin abscess, r/o gas,   TECHNOLOGIST PROVIDED HISTORY:   Additional Contrast?->None   Reason for exam:->left groin abscess, r/o gas,   What reading provider will be dictating this exam?->CRC   FINDINGS:   Lower Chest: Increased interstitial markings and chronic parenchymal changes   at the lung bases.  Small right pleural effusion.  Enlarged subcarinal lymph   node measuring 2.7 x 2.4 cm noted. Organs: No focal liver lesions.  Gallstone versus sludge noted in the   gallbladder.  Mild pericholecystic fluid collection.  Spleen is not enlarged. Pancreas shows normal contour.  Adrenal glands appear unremarkable.  The   kidneys are atrophic bilaterally. GI/Bowel: No obstructing or constricting mass lesions. Pelvis: Incompletely distended bladder with thickening of the bladder wall   could be due to pseudo thickening or cystitis.  Mild-to-moderate ascites. Peritoneum/Retroperitoneum: Ascites.  No free air or significant adenopathy. Bones/Soft Tissues: Diffuse subcutaneous edema.  Orthopedic hardware through   the sacrum and SI joints.       Impression   Mild to moderate ascites and diffuse subcutaneous edema. Severely atrophic kidneys. Gallstones/gallbladder sludge.    Parenchymal changes and interstitial infiltrates in the visualized lung bases   with a small right pleural effusion.  Partially visualized enlarged   subcarinal lymph node.  Consider chest CT for complete evaluation of the   chest.       Patient MRN:  [id number]   : 1985   Age: 38 years   Gender: Female   Order Date:  2021   EXAM: US DUP LOWER EXTREMITIES BILATERAL VENOUS NUMBER OF IMAGES:  40   INDICATION:  DVT    DVT   COMPARISON: None   Within the visualized vessels, there is no evidence for deep venous   thrombosis   There is good compressibility, there is good augmentation, there is   good color flow.        Impression   Within the visualized vessels there is no evidence for deep venous   thrombosis         Assessment & Plan:  1. ESRD  dependent on IHD via Home IHD on 4 days per week with 3 hour treatments using the LUE AVF   Has been noncompliant with treatments-most recent hd as outpt was Friday, 1/22. She has had treatments 3 days in a row for clearance and volume reduction  SW assistance as she has stated she wants to go back to in center HD and does not want PD  HD today    2. Htn with CKD 5/ESRD  BP at/near goal <140/90  Volume removal on IHD  Continue home meds: amlodipine 5 mg and carvedilol 25 mg bid, added hydralazine 50 mg Q 8 hrs 1/29 1/31 Hydralazine increased to 75 mg tid with improvement in BP    3. DM   uncontrolled as evidenced by HgbA1c 9.1   Blood sugar in   Primary following    4. Anemia in CKD  Hgb below goal >10  Transfuse < 7.0  Iron 50, iron sat 29, TIBC 173, folate 19.4, Vit B-12 784, Ferritin 613  Check stool for OB  Start VALENTIN    5. Sec HPTH of renal origin  PO4 5.7       6.  Left groin abscess  I&D drained 1/29    Thank you Dr. Sherrie Villegas MD for asking us to participate in care of Peterson Dc, APRN - CNS  2:08 PM  2/1/2021   Pt seen and examined agree with above  For daily hd due to vol overload  Needs outpt hd set up  Pallavi Mendosa

## 2021-02-01 NOTE — FLOWSHEET NOTE
02/01/21 1720   Vital Signs   BP (!) 140/78   Temp 97.8 °F (36.6 °C)   Pulse 117   Resp 18   Weight 154 lb 15.7 oz (70.3 kg)   Weight Method Bed scale   Percent Weight Change -5.89   Pain Assessment   Pain Assessment 0-10   Pain Level 0   Post-Hemodialysis Assessment   Post-Treatment Procedures Blood returned; Access bleeding time < 10 minutes   Machine Disinfection Process Acid/Vinegar Clean;Heat Disinfect; Exterior Machine Disinfection   Rinseback Volume (ml) 300 ml   Total Liters Processed (l/min) 89.9 l/min   Dialyzer Clearance Lightly streaked   Duration of Treatment (minutes) 240 minutes   Heparin amount administered during treatment (units) 0 units   Hemodialysis Intake (ml) 300 ml   Hemodialysis Output (ml) 4300 ml   NET Removed (ml) 4000 ml   Tolerated Treatment Good   Patient Response to Treatment tolerated well, profile B, refill noted, 4L fluid removal   Bilateral Breath Sounds Diminished   Edema Generalized   Physician Notified?  No

## 2021-02-01 NOTE — PROGRESS NOTES
Pharmacy Consultation Note  (Antibiotic Dosing and Monitoring)    Initial consult date: 1/27/21  Consulting physician: Anabelle Del Rio  Drug: Vancomycin  Indication: Thigh/groin abscess    Age/  Gender Height Weight IBW Dosing weight  kg  Allergy Information   35 y.o./female 4' 11\" (149.9 cm) 135 lb (61.2 kg)     Patient must be at least 60 in tall to calculate ideal body weight  78.7  Patient has no known allergies. Other anti-infectives Start date Stop date   Zosyn 1/27 1/30   Augmentin 1/30 (refused all doses after 1/30)               Date  Tmax WBC BUN/CR CrCL  (mL/min) Drug/Dose Time   Given Level(s)   (Time) Comments   1/28 afebrile 7.5 ESRD HD x 4 hr 0630 to 1030  Vancomycin 1250 mg x 1 1245     1/29 afebrile 6.1 ESRD HD X 4 hr 0700 to 1100 No dose  27.3 @0614 random    1/30 afebrile 9.6 ESRD HD x 3 hr 730 to 1030 Vancomycin 750 mg x 1 1749 17.5 @0725 random    1/31 afebrile -- ESRD -- No Dose      2/1 afebrile -- ESRD Hd Scheduled Vancomycin 500 mg x1 1700     2/2       Random with morning labs                     Intake/Output Summary (Last 24 hours) at 2/1/2021 0850  Last data filed at 2/1/2021 0515  Gross per 24 hour   Intake 1080 ml   Output 0 ml   Net 1080 ml       Average urine output:    Cultures:    Site Date Result   Blood 1 1/27 24h no growth   Blood 2 1/27 24h no growth     No results for input(s): BLOODCULT2 in the last 72 hours. Historical Cultures:  WOUND  Organism   Date Value Ref Range Status   01/27/2021 Corynebacterium species (A)  Final   01/27/2021 Staphylococcus coagulase-negative (A)  Final     No results for input(s): BC in the last 72 hours. Radiology:      Assessment:  · Pharmacy consulted to dose vancomycin for this 38 y.o. female for thigh/groin abscess. · Goal trough = 15-20 mcg/mL. Will follow TRS HD schedule while inpatient. · IHD today X 4 hr. Plan:  · Pharmacist will follow and monitor/adjust dosing as necessary if vancomycin is to continue.   · Vancomycin 500 mg once today after dialysis (1800)    Yuli Carpenter, PharmD 2/1/2021 9:16 AM

## 2021-02-02 VITALS
BODY MASS INDEX: 29.47 KG/M2 | HEART RATE: 110 BPM | OXYGEN SATURATION: 95 % | RESPIRATION RATE: 18 BRPM | DIASTOLIC BLOOD PRESSURE: 78 MMHG | WEIGHT: 146.16 LBS | HEIGHT: 59 IN | SYSTOLIC BLOOD PRESSURE: 158 MMHG | TEMPERATURE: 98.2 F

## 2021-02-02 LAB
ANION GAP SERPL CALCULATED.3IONS-SCNC: 9 MMOL/L (ref 7–16)
BASOPHILS ABSOLUTE: 0.02 E9/L (ref 0–0.2)
BASOPHILS RELATIVE PERCENT: 0.4 % (ref 0–2)
BUN BLDV-MCNC: 17 MG/DL (ref 6–20)
CALCIUM SERPL-MCNC: 8.4 MG/DL (ref 8.6–10.2)
CHLORIDE BLD-SCNC: 97 MMOL/L (ref 98–107)
CO2: 32 MMOL/L (ref 22–29)
CREAT SERPL-MCNC: 5.9 MG/DL (ref 0.5–1)
EOSINOPHILS ABSOLUTE: 0.25 E9/L (ref 0.05–0.5)
EOSINOPHILS RELATIVE PERCENT: 4.6 % (ref 0–6)
GFR AFRICAN AMERICAN: 10
GFR NON-AFRICAN AMERICAN: 10 ML/MIN/1.73
GLUCOSE BLD-MCNC: 317 MG/DL (ref 74–99)
HCT VFR BLD CALC: 22.2 % (ref 34–48)
HEMOGLOBIN: 7.3 G/DL (ref 11.5–15.5)
IMMATURE GRANULOCYTES #: 0.04 E9/L
IMMATURE GRANULOCYTES %: 0.7 % (ref 0–5)
LYMPHOCYTES ABSOLUTE: 1.52 E9/L (ref 1.5–4)
LYMPHOCYTES RELATIVE PERCENT: 28 % (ref 20–42)
MCH RBC QN AUTO: 32 PG (ref 26–35)
MCHC RBC AUTO-ENTMCNC: 32.9 % (ref 32–34.5)
MCV RBC AUTO: 97.4 FL (ref 80–99.9)
METER GLUCOSE: 174 MG/DL (ref 74–99)
METER GLUCOSE: 330 MG/DL (ref 74–99)
MONOCYTES ABSOLUTE: 0.66 E9/L (ref 0.1–0.95)
MONOCYTES RELATIVE PERCENT: 12.2 % (ref 2–12)
NEUTROPHILS ABSOLUTE: 2.94 E9/L (ref 1.8–7.3)
NEUTROPHILS RELATIVE PERCENT: 54.1 % (ref 43–80)
PDW BLD-RTO: 17.2 FL (ref 11.5–15)
PLATELET # BLD: 196 E9/L (ref 130–450)
PMV BLD AUTO: 10 FL (ref 7–12)
POTASSIUM REFLEX MAGNESIUM: 4.4 MMOL/L (ref 3.5–5)
POTASSIUM SERPL-SCNC: 4.4 MMOL/L (ref 3.5–5)
RBC # BLD: 2.28 E12/L (ref 3.5–5.5)
SODIUM BLD-SCNC: 138 MMOL/L (ref 132–146)
VANCOMYCIN RANDOM: 22.3 MCG/ML (ref 5–40)
WBC # BLD: 5.4 E9/L (ref 4.5–11.5)

## 2021-02-02 PROCEDURE — 90935 HEMODIALYSIS ONE EVALUATION: CPT

## 2021-02-02 PROCEDURE — 36415 COLL VENOUS BLD VENIPUNCTURE: CPT

## 2021-02-02 PROCEDURE — 80048 BASIC METABOLIC PNL TOTAL CA: CPT

## 2021-02-02 PROCEDURE — 6370000000 HC RX 637 (ALT 250 FOR IP): Performed by: STUDENT IN AN ORGANIZED HEALTH CARE EDUCATION/TRAINING PROGRAM

## 2021-02-02 PROCEDURE — 6360000002 HC RX W HCPCS: Performed by: STUDENT IN AN ORGANIZED HEALTH CARE EDUCATION/TRAINING PROGRAM

## 2021-02-02 PROCEDURE — 82962 GLUCOSE BLOOD TEST: CPT

## 2021-02-02 PROCEDURE — 85025 COMPLETE CBC W/AUTO DIFF WBC: CPT

## 2021-02-02 PROCEDURE — 6370000000 HC RX 637 (ALT 250 FOR IP): Performed by: FAMILY MEDICINE

## 2021-02-02 PROCEDURE — 80202 ASSAY OF VANCOMYCIN: CPT

## 2021-02-02 PROCEDURE — 6370000000 HC RX 637 (ALT 250 FOR IP): Performed by: NURSE PRACTITIONER

## 2021-02-02 RX ADMIN — FAMOTIDINE 20 MG: 20 TABLET ORAL at 12:15

## 2021-02-02 RX ADMIN — HEPARIN SODIUM 5000 UNITS: 10000 INJECTION INTRAVENOUS; SUBCUTANEOUS at 03:00

## 2021-02-02 RX ADMIN — Medication 2000 UNITS: at 12:17

## 2021-02-02 RX ADMIN — ACETAMINOPHEN 650 MG: 325 TABLET ORAL at 05:51

## 2021-02-02 RX ADMIN — HYDRALAZINE HYDROCHLORIDE 75 MG: 50 TABLET, FILM COATED ORAL at 05:52

## 2021-02-02 RX ADMIN — HYDRALAZINE HYDROCHLORIDE 75 MG: 50 TABLET, FILM COATED ORAL at 12:16

## 2021-02-02 RX ADMIN — CARVEDILOL 25 MG: 25 TABLET, FILM COATED ORAL at 12:15

## 2021-02-02 ASSESSMENT — PAIN DESCRIPTION - ORIENTATION: ORIENTATION: LEFT

## 2021-02-02 ASSESSMENT — PAIN DESCRIPTION - LOCATION: LOCATION: PERINEUM

## 2021-02-02 ASSESSMENT — PAIN SCALES - GENERAL: PAINLEVEL_OUTOF10: 5

## 2021-02-02 NOTE — CARE COORDINATION
2/2/2021 social work transition of care planning  Pt is from home with her dtr and had been independent. Pt pcp is Kimber Crum and pharmacy is [facility] on [address]. Explained sw role in transition of care planning. Pt plan is home, will call for a ride at discharge. Pt would like eunice Lucas, in hopes to switch back to home Hd in the future. Sw will follow up with pt, once there is a chair time.   Electronically signed by MARCELO Leiva on 2/2/2021 at 12:34 PM

## 2021-02-02 NOTE — PROGRESS NOTES
Physical Therapy order received and chart reviewed. Per conversation with patient, Pt is independent with all functional mobility. Pt with no skilled acute PT needs at this time. Pt instructed to notify any medical team member if functional status were to change. Will discontinue PT services.      Fredy Goodwin PT, DPT  [id number]

## 2021-02-02 NOTE — PLAN OF CARE
Problem: Falls - Risk of:  Goal: Will remain free from falls  Description: Will remain free from falls  2/2/2021 0220 by Jose Luis Suresh RN  Outcome: Met This Shift     Problem: Falls - Risk of:  Goal: Absence of physical injury  Description: Absence of physical injury  2/2/2021 0220 by Jose Luis Suresh RN  Outcome: Met This Shift

## 2021-02-02 NOTE — PROGRESS NOTES
Physician Progress Note      PATIENT:               France Tafoya  [facility] #:                  [id number]  :                       1985  ADMIT DATE:       2021 1:52 PM  DISCH DATE:  RESPONDING  PROVIDER #:        INDIGO DILL DO          QUERY TEXT:    Patient admitted with abscess. Per Op note dated 21 documentation of   debridement. To accurately reflect the procedure performed please document if   debridement was excisional or nonexcisional and the deepest depth of tissue   removed as down to and including: The medical record reflects the following:  Risk Factors: perineal abscess  Clinical Indicators: 21 \"PERINEAL ABCESS INCISION AND DRAINAGE:  Left perineal abscess collection was noted to be spontaneously draining at   this time. A longitudinal incision was made on the left lateral portion of   abscess collection. Blood and purulent fluid were expressed. Anaerobic and   wound cultures were obtained. Hemostats were used to break up loculations. The abscess collection was noted to track medially and posteriorly. A   longitudinal counterincision was made medially. Further purulent fluid was   expressed. Hemostats were used to connect both incisions. At this time   decision was made to connect both incisions.   Anterior p  Treatment: I&D    Thank you,  Mele Casarez, RN, BSN, Clinical Documentation Improvement  Options provided:  -- Nonexcisional debridement of skin  -- Excisional debridement of skin  -- Nonexcisional debridement of subcutaneous tissue  -- Excisional debridement of subcutaneous tissue  -- Nonexcisional debridement of fascia  -- Excisional debridement of fascia  -- Nonexcisional debridement of muscle  -- Excisional debridement of muscle  -- Nonexcisional debridement of bone  -- Excisional debridement of bone  -- Other - I will add my own diagnosis  -- Disagree - Not applicable / Not valid  -- Disagree - Clinically unable to determine / Unknown  -- Refer to Clinical Documentation Reviewer    PROVIDER RESPONSE TEXT:    Excisional debridement of subcutaneous tissue of perineal abscess during I&D   on 1/29/21.     Query created by: Christiano Grey on 2/1/2021 2:39 PM      Electronically signed by:  Angeles DILL DO 2/2/2021 7:56 AM

## 2021-02-02 NOTE — CARE COORDINATION
2/2/2021 social work transition of care planning  Nancie spoke with jos from [facility] chair time available is M-W-F 12:10pm. Sw followed up with pt, she stated that she will continue her HD at home for now. Nancie updated Jos and Nurse. SUSAN Anthony will need wound care orders in epic prior to discharge.   Electronically signed by MARCELO Steel on 2/2/2021 at 2:57 PM

## 2021-02-02 NOTE — PROGRESS NOTES
Nephrology Progress Note    We are following Rc Franklin for ESRD-IHD MGMT      History of Present Ilness:  From 1/28/2021 note:  Rc Franklin is a 38 y.o. female with prior history of ESRD dependent on IHD, sec to Type 1 DM requiring RRT via Home Hemodialysis. Pt has been noncompliant and has not had a treatment since last Friday, 1/22, she states due to a vaginal abscess which causes it to be too painful to sit up to perform dialysis. Other pmh includes: HTN, CHF anemia, active smoker, history of pericardial effusion s/t window, and history of catheter related infection. She arrived at ER yesterday with c/o sob and wound check, c/o a boil on her left leg that is draining. Significant labs include: Na 132, K+ 5.9, BUN 54, Cr 14.9, eGFR 3, glucose 475, ca+ 9.2, Pro-BNP 31,163, trop 0.13, alb 3.5, HgbA1C 9.1. Vital signs: T 96.9, R 24, P 85, /64, 100% on O2-4L. Upon assessment, pt states she usually does her home hemo 4 times a week, but no specific days and reports she couldn't do dialysis because of the pain she is having in her left groin panty line region, stating she's had this boil since Friday and can not sit up due to the pain. Interval History:  2/1/21- She is feeling better, less short of breath today. 2/2/2021: examined during HD treatment. She states she is feeling much better and would like to go home today. She wishes to continue home hemodialysis. Discussed the need to decrease fluid intake and to do the home treatments as prescribed.     Past Medical History:   Diagnosis Date    JOLLY (acute kidney injury) ([facility] 4/5/2016    AVF (arteriovenous fistula) ([facility] 02/19/2018    let arm    Chronic kidney disease     Dialysis AV fistula malfunction, initial encounter ([facility] 11/7/2019    DM type 1 (diabetes mellitus, type 1) ([facility]     Encounter regarding vascular access for dialysis for ESRD ([facility] 07/12/2018    [facility]     Hemodialysis patient [facility])     amrita dawson mon wed fri / graft in left arm    Hypertension     Tobacco abuse 2016       Past Surgical History:   Procedure Laterality Date     SECTION      CYST INCISION AND DRAINAGE  2011    perirectal abscess. [facility]. Dr. Zeina Barahona Left 2019    LEFT ARM FISTULAGRAM, BALLOON ANGIOPLASTY performed by Caitlin Mckeon MD at [address]      FRACTURE SURGERY      fracture right ulnar, left tibia, and pelvis    OTHER SURGICAL HISTORY      open reduction internal fixation left radial shaft    OTHER SURGICAL HISTORY  2016    pericardial window    OTHER SURGICAL HISTORY  2016     r tesio insertion  / remove 2018    OTHER SURGICAL HISTORY Left 2017    insertion a-v fistula left arm    HI ANASTOMOSIS,AV,ANY SITE Left 2018    REVISION AV FISTULA - LEFT ARM performed by Caitlin Mckeon MD at [facility] NON-TUNNEL CV CATH N/A 5/10/2018    TESIO CATHETER INSERTION performed by Rob Jean MD at [address] PRERETINAL MEMBRANE Left 2018    PARS PLANA VITRECTOMY 25 GAUGE, VITREOUS HEMORRHAGE REMOVAL, ENDO LASER, AIR/FLUID  EXCHANGE LEFT EYE performed by Cristofer Peres MD at [address] N/A 2021    PERINEAL ABCESS INCISION AND DRAINAGE (LITHOTOMY) performed by Nino Brooks MD at [address] [place] [address] History   Problem Relation Age of Onset    Stroke Mother     Cancer Father     Diabetes Paternal Aunt         reports that she has been smoking cigarettes. She has smoked for the past 15.00 years. She has never used smokeless tobacco. She reports previous alcohol use. She reports that she does not use drugs. Allergies:  Patient has no known allergies.     Current Medications:        vancomycin (VANCOCIN) 750 mg in dextrose 5 % 250 mL IVPB, Once      benzonatate (TESSALON) capsule 100 mg, TID PRN      hydrALAZINE (APRESOLINE) tablet 75 mg, 3 times per day      amoxicillin-clavulanate (AUGMENTIN) 875-125 MG per tablet 1 tablet, 2 times per day      glucose (GLUTOSE) 40 % oral gel 15 g, PRN      dextrose 50 % IV solution, PRN      glucagon (rDNA) injection 1 mg, PRN      dextrose 5 % solution, PRN      insulin glargine (LANTUS) injection vial 18 Units, Nightly      insulin lispro (HUMALOG) injection vial 0-18 Units, TID WC      insulin lispro (HUMALOG) injection vial 0-9 Units, Nightly      guaiFENesin-dextromethorphan (ROBITUSSIN DM) 100-10 MG/5ML syrup 5 mL, Q4H PRN      bumetanide (BUMEX) injection 1 mg, Q12H      vancomycin (VANCOCIN) intermittent dosing (placeholder), RX Placeholder      hydrALAZINE (APRESOLINE) injection 10 mg, Q6H PRN      epoetin nadya-epbx (RETACRIT) injection 2,000 Units, Once per day on Mon Wed Fri      morphine sulfate (PF) injection 4 mg, Once      glucose (GLUTOSE) 40 % oral gel 15 g, PRN      dextrose 50 % IV solution, PRN      glucagon (rDNA) injection 1 mg, PRN      dextrose 5 % solution, PRN      albuterol (PROVENTIL) nebulizer solution 2.5 mg, Q4H PRN      b complex-C-folic acid (NEPHROCAPS) capsule 1 mg, Dinner      carvedilol (COREG) tablet 25 mg, BID WC      vitamin D (CHOLECALCIFEROL) tablet 2,000 Units, Daily      famotidine (PEPCID) tablet 20 mg, QAM      sodium chloride flush 0.9 % injection 10 mL, 2 times per day      sodium chloride flush 0.9 % injection 10 mL, PRN      promethazine (PHENERGAN) tablet 12.5 mg, Q6H PRN    Or      ondansetron (ZOFRAN) injection 4 mg, Q6H PRN      polyethylene glycol (GLYCOLAX) packet 17 g, Daily PRN      acetaminophen (TYLENOL) tablet 650 mg, Q6H PRN    Or      acetaminophen (TYLENOL) suppository 650 mg, Q6H PRN      heparin (porcine) injection 5,000 Units, Q8H        Review of Systems:   Pertinent items are noted in HPI.     Physical exam:   Constitutional:    Vitals: VITALS:  BP (!) 181/90   Pulse 116   Temp 97.7 °F (36.5 °C)   Resp 18   Ht 4' 11\" (1.499 m)   Wt 154 lb 5.2 oz (70 kg)   SpO2 95%   BMI 31.17 kg/m²   24HR INTAKE/OUTPUT:      Intake/Output Summary (Last 24 hours) at 2/2/2021 0959  Last data filed at 2/2/2021 0650  Gross per 24 hour   Intake 1430 ml   Output 4300 ml   Net -2870 ml     URINARY CATHETER OUTPUT (Rolle):     Skin: no rash on exposed skin, turgor wnl  Neck: no bruits or jvd noted  Cardiovascular: RRR, no rub  Respiratory: CTA B without w/r/r  Abdomen:  +bs, soft, nt, nd  Ext: 1+ lower extremity edema, b/l arm edema & facial edema decreased from yesterday  Psychiatric: mood and affect appropriate      Data:   Labs:  CBC:   Lab Results   Component Value Date    WBC 5.4 02/02/2021    RBC 2.28 02/02/2021    RBC  08/19/2016      RBC           [id number]    transfused   08/21/16  07:02  SCC    RBC  08/19/2016      RBC           [id number]    released     08/23/16  00:54  SCC    HGB 7.3 02/02/2021    HCT 22.2 02/02/2021    MCV 97.4 02/02/2021    MCH 32.0 02/02/2021    MCHC 32.9 02/02/2021    RDW 17.2 02/02/2021     02/02/2021    MPV 10.0 02/02/2021     CBC with Differential:    Lab Results   Component Value Date    WBC 5.4 02/02/2021    RBC 2.28 02/02/2021    RBC  08/19/2016      RBC           [id number]    transfused   08/21/16  07:02  SCC    RBC  08/19/2016      RBC           [id number]    released     08/23/16  00:54  SCC    HGB 7.3 02/02/2021    HCT 22.2 02/02/2021     02/02/2021    MCV 97.4 02/02/2021    MCH 32.0 02/02/2021    MCHC 32.9 02/02/2021    RDW 17.2 02/02/2021    SEGSPCT 53 10/08/2013    LYMPHOPCT 28.0 02/02/2021    MONOPCT 12.2 02/02/2021    BASOPCT 0.4 02/02/2021    MONOSABS 0.66 02/02/2021    LYMPHSABS 1.52 02/02/2021    EOSABS 0.25 02/02/2021    BASOSABS 0.02 02/02/2021     CMP:    Lab Results   Component Value Date     02/02/2021    K 4.4 02/02/2021    CL 97 02/02/2021    CO2 32 02/02/2021    BUN 17 02/02/2021    CREATININE 5.9 02/02/2021    GFRAA 10 02/02/2021    LABGLOM 10 02/02/2021    GLUCOSE 317 02/02/2021    GLUCOSE 279 07/27/2011    PROT 6.8 01/27/2021    LABALBU 3.5 01/27/2021    LABALBU 3.9 01/10/2011    CALCIUM 8.4 02/02/2021    BILITOT 0.3 01/27/2021    ALKPHOS 99 01/27/2021    AST 18 01/27/2021    ALT 33 01/27/2021     BMP:    Lab Results   Component Value Date     02/02/2021    K 4.4 02/02/2021    CL 97 02/02/2021    CO2 32 02/02/2021    BUN 17 02/02/2021    LABALBU 3.5 01/27/2021    LABALBU 3.9 01/10/2011    CREATININE 5.9 02/02/2021    CALCIUM 8.4 02/02/2021    GFRAA 10 02/02/2021    LABGLOM 10 02/02/2021    GLUCOSE 317 02/02/2021    GLUCOSE 279 07/27/2011     Ionized Calcium:    Lab Results   Component Value Date    IONCA 1.39 04/30/2013     Magnesium:    Lab Results   Component Value Date    MG 2.2 02/01/2021     Phosphorus:    Lab Results   Component Value Date    PHOS 6.6 02/01/2021     LDH:    Lab Results   Component Value Date     07/14/2020     Uric Acid:  No results found for: LABURIC, URICACID  PT/INR:    Lab Results   Component Value Date    PROTIME 12.2 11/26/2018    INR 1.1 11/26/2018     Warfarin PT/INR:  No components found for: PTPATWAR, PTINRWAR  PTT:    Lab Results   Component Value Date    APTT 29.8 11/26/2018   [APTT}  Troponin:    Lab Results   Component Value Date    TROPONINI 0.13 01/27/2021     Last 3 Troponin:    Lab Results   Component Value Date    TROPONINI 0.13 01/27/2021    TROPONINI 0.04 07/13/2020    TROPONINI <0.01 11/26/2018     U/A:    Lab Results   Component Value Date    COLORU Yellow 11/23/2017    PROTEINU >=300 11/23/2017    PHUR 8.5 11/23/2017    LABCAST FEW  09/29/2013    LABCAST FEW  09/29/2013    WBCUA 2-5 11/23/2017    WBCUA NONE 11/21/2010    RBCUA 5-10 11/23/2017    RBCUA 0-1 09/29/2013    TRICHOMONAS RARE 08/16/2016    BACTERIA RARE 11/23/2017    CLARITYU Clear 11/23/2017    SPECGRAV 1.015 11/23/2017    LEUKOCYTESUR Negative 11/23/2017    UROBILINOGEN 0.2 11/23/2017    BILIRUBINUR Negative 11/23/2017    BILIRUBINUR NEGATIVE 11/21/2010    BLOODU MODERATE 11/23/2017    GLUCOSEU 500 11/23/2017    GLUCOSEU >=1000 11/21/2010     ABG:    Lab Results   Component Value Date    PH 7.467 11/26/2018    PCO2 45.1 11/26/2018    PO2 63.1 11/26/2018    HCO3 31.9 11/26/2018    BE 7.3 11/26/2018    O2SAT 91.8 11/26/2018     HgBA1c:    Lab Results   Component Value Date    LABA1C 9.1 01/27/2021     Microalbumen/Creatinine ratio:  No components found for: RUCREAT  FLP:    Lab Results   Component Value Date    TRIG 68 10/08/2013    HDL 40 01/29/2021    LDLCALC 41 01/29/2021    LABVLDL 7 01/29/2021     TSH:    Lab Results   Component Value Date    TSH 3.850 01/27/2021     VITAMIN B12: No components found for: B12  FOLATE:    Lab Results   Component Value Date    FOLATE 19.4 01/27/2021     IRON:    Lab Results   Component Value Date    IRON 50 01/27/2021     Iron Saturation:  No components found for: PERCENTFE  TIBC:    Lab Results   Component Value Date    TIBC 173 01/27/2021     FERRITIN:    Lab Results   Component Value Date    FERRITIN 613 01/29/2021     RPR:  No results found for: RPR  AMYLASE:    Lab Results   Component Value Date    AMYLASE 83 11/26/2018     LIPASE:    Lab Results   Component Value Date    LIPASE 12 11/26/2018     Urine Toxicology:  No components found for: Sammie Sunshine, IBENZO, ICOCAINE, IMARTHC, IOPIATES, IPHENCYC     Imaging:  EXAMINATION:   ONE XRAY VIEW OF THE CHEST   1/27/2021 2:46 pm   COMPARISON:   August 24, 2020   HISTORY:   ORDERING SYSTEM PROVIDED HISTORY: sob, CHF   TECHNOLOGIST PROVIDED HISTORY:   Reason for exam:->sob, CHF   What reading provider will be dictating this exam?->CRC   FINDINGS:   The heart is enlarged.  There are bilateral infiltrates.  The costophrenic   angles are mildly blunted.  No pneumothorax.       Impression   CHF.  Superimposed pneumonia cannot be excluded.      EXAMINATION:   CT OF THE ABDOMEN AND PELVIS WITH CONTRAST 2021 5:13 pm   TECHNIQUE:   CT of the abdomen and pelvis was performed with the administration of   intravenous contrast. Multiplanar reformatted images are provided for review. Dose modulation, iterative reconstruction, and/or weight based adjustment of   the mA/kV was utilized to reduce the radiation dose to as low as reasonably   achievable. COMPARISON:   None. HISTORY:   ORDERING SYSTEM PROVIDED HISTORY: left groin abscess, r/o gas,   TECHNOLOGIST PROVIDED HISTORY:   Additional Contrast?->None   Reason for exam:->left groin abscess, r/o gas,   What reading provider will be dictating this exam?->CRC   FINDINGS:   Lower Chest: Increased interstitial markings and chronic parenchymal changes   at the lung bases.  Small right pleural effusion.  Enlarged subcarinal lymph   node measuring 2.7 x 2.4 cm noted. Organs: No focal liver lesions.  Gallstone versus sludge noted in the   gallbladder.  Mild pericholecystic fluid collection.  Spleen is not enlarged. Pancreas shows normal contour.  Adrenal glands appear unremarkable.  The   kidneys are atrophic bilaterally. GI/Bowel: No obstructing or constricting mass lesions. Pelvis: Incompletely distended bladder with thickening of the bladder wall   could be due to pseudo thickening or cystitis.  Mild-to-moderate ascites. Peritoneum/Retroperitoneum: Ascites.  No free air or significant adenopathy. Bones/Soft Tissues: Diffuse subcutaneous edema.  Orthopedic hardware through   the sacrum and SI joints.       Impression   Mild to moderate ascites and diffuse subcutaneous edema. Severely atrophic kidneys. Gallstones/gallbladder sludge.    Parenchymal changes and interstitial infiltrates in the visualized lung bases   with a small right pleural effusion.  Partially visualized enlarged   subcarinal lymph node.  Consider chest CT for complete evaluation of the   chest.       Patient MRN:  [id number]   : 1985   Age: 38 years   Gender: Female   Order Date:  1/27/2021   EXAM: US DUP LOWER EXTREMITIES BILATERAL VENOUS   NUMBER OF IMAGES:  40   INDICATION:  DVT    DVT   COMPARISON: None   Within the visualized vessels, there is no evidence for deep venous   thrombosis   There is good compressibility, there is good augmentation, there is   good color flow.        Impression   Within the visualized vessels there is no evidence for deep venous   thrombosis         Assessment & Plan:  1. ESRD  dependent on IHD via Home IHD on 4 days per week with 3 hour treatments using the LUE AVF   Has been noncompliant with treatments-most recent hd as outpt was Friday, 1/22. She has had treatments 3 days in a row for clearance and volume reduction  SW assistance as she has stated she wants to go back to in center HD and does not want PD  HD today - UFR set for 3800ml  She now wants to continue Home HD treatment for now. 2. Htn with CKD 5/ESRD  BP at/near goal <140/90  Volume removal on IHD  Continue home meds: amlodipine 5 mg and carvedilol 25 mg bid, added hydralazine 50 mg Q 8 hrs 1/29 1/31 Hydralazine increased to 75 mg tid with improvement in BP    3. DM   uncontrolled as evidenced by HgbA1c 9.1   Blood sugar in   Primary following    4. Anemia in CKD  Hgb below goal >10  Transfuse < 7.0  Iron 50, iron sat 29, TIBC 173, folate 19.4, Vit B-12 784, Ferritin 613  Check stool for OB  Start VALENTIN    5. Sec HPTH of renal origin  PO4 5.7       6. Left groin abscess  I&D drained 1/29  On [address] for discharge from Renal standpoint. Patient to follow-up with [facility] Therapy nurse.     Thank you Dr. Polo Gallardo MD for asking us to participate in care of Briseida Carvajal, [address]  9:59 AM  2/2/2021   Pt seen and examined agree with above  uf as bp allows due to vol overload  Sp I and d of abscess  On augmentin  Ok for discharge  Patel Alegria

## 2021-02-02 NOTE — PROGRESS NOTES
Patient being discharged home with [facility]. Medications and discharge instructions reviewed with patient. No questions or concerns . patient had no IV access, just HD fistula.  Patient is waiting for family to pick them up

## 2021-02-02 NOTE — CARE COORDINATION
2/2/2021 social work transition of care planning  Sw attempted 2x this am, pt is still out for HD. Sw made referral to [facility],due to previous hx with outpatient HD. Sw will attempt later today.   Electronically signed by MARCELO Sutherland on 2/2/2021 at 11:37 AM

## 2021-02-02 NOTE — FLOWSHEET NOTE
02/02/21 1121   Vital Signs   BP (!) 165/72   Temp 97.8 °F (36.6 °C)   Pulse 112   Weight 146 lb 2.6 oz (66.3 kg)   Weight Method Bed scale   Percent Weight Change -5.29   Post-Hemodialysis Assessment   Post-Treatment Procedures Blood returned; Access bleeding time < 10 minutes   Machine Disinfection Process Acid/Vinegar Clean;Heat Disinfect; Exterior Machine Disinfection   Rinseback Volume (ml) 300 ml   Total Liters Processed (l/min) 79.6 l/min   Dialyzer Clearance Lightly streaked   Duration of Treatment (minutes) 240 minutes   Heparin amount administered during treatment (units) 0 units   Hemodialysis Intake (ml) 300 ml   Hemodialysis Output (ml) 4000 ml   NET Removed (ml) 3700 ml   Tolerated Treatment Good   Patient Response to Treatment tolerated well, blood returned, needles pulled, sites held, stasis achieved, bandaids applied, +thirill, +bruit

## 2021-02-02 NOTE — PLAN OF CARE
Problem: Falls - Risk of:  Goal: Will remain free from falls  Description: Will remain free from falls  2/2/2021 1551 by Daquan Herring RN  Outcome: Completed  2/2/2021 0220 by Mario Altman RN  Outcome: Met This Shift  Goal: Absence of physical injury  Description: Absence of physical injury  2/2/2021 1551 by Daquan Herring RN  Outcome: Completed  2/2/2021 0220 by Mario Altman RN  Outcome: Met This Shift     Problem: Daily Care:  Goal: Daily care needs are met  Description: Daily care needs are met  Outcome: Completed     Problem: Skin Integrity:  Goal: Will show no infection signs and symptoms  Description: Will show no infection signs and symptoms  Outcome: Completed  Goal: Absence of new skin breakdown  Description: Absence of new skin breakdown  Outcome: Completed

## 2021-02-02 NOTE — PROGRESS NOTES
Hospitalist Progress Note      SYNOPSIS: Patient admitted on 2021   Patient with end-stage renal disease on peritoneal dialysis. Missed dialysis on several days. Generalized edema. Shortness of breath and cough. Has an abscess in the groin area. : Current status summarized  Nephrology indicates that she has been undergoing dialysis treatments  Hydralazine increased to 75 mg 3 times a day   Blood sugars need better control  Left groin abscess has been drained   There are no current labs we will update these  Her last hemoglobin was 7.8  Last BUN 21 and last creatinine 6.4 these are dated day before yesterday       Patient seen in dialysis  Currently nephrology has approved discharge  Patient feels fine and wants to go home      SUBJECTIVE:  Patient seen and examined  Records reviewed. Glucose better  Patient feels okay today and voices no complaints      Temp (24hrs), Av.8 °F (36.6 °C), Min:97.5 °F (36.4 °C), Max:98.2 °F (36.8 °C)    DIET: DIET RENAL; Renal  CODE: Full Code    Intake/Output Summary (Last 24 hours) at 2021 1124  Last data filed at 2021 0650  Gross per 24 hour   Intake 1430 ml   Output 4300 ml   Net -2870 ml       OBJECTIVE:    BP (!) 170/62   Pulse 119   Temp 97.7 °F (36.5 °C)   Resp 18   Ht 4' 11\" (1.499 m)   Wt 154 lb 5.2 oz (70 kg)   SpO2 95%   BMI 31.17 kg/m²     General appearance: Obese no apparent distress, appears stated age and cooperative. HEENT:  Conjunctivae/corneas clear. Neck: Supple. No jugular venous distention.    Respiratory: Bilateral rhonchi  Cardiovascular: Regular rate rhythm, normal S1-S2  Abdomen: Soft, nontender, nondistended  Musculoskeletal: Edema  Skin:  No rashes  on visible skin  Neurologic: awake, alert and following commands     ASSESSMENT:  ESRD needing dialysis  Volume overload being addressed by dialysis  Acute on chronic diastolic congestive heart failure  Acute respiratory failure with hypoxia resolved  Thigh/groin 72 hours. No results for input(s): INR in the last 72 hours. No results for input(s): Derrick Beauchamp in the last 72 hours. Chronic labs:    Lab Results   Component Value Date    CHOL 155 10/08/2013    TRIG 68 10/08/2013    HDL 40 01/29/2021    LDLCALC 41 01/29/2021    TSH 3.850 01/27/2021    INR 1.1 11/26/2018    LABA1C 9.1 (H) 01/27/2021       Radiology: REVIEWED DAILY    +++++++++++++++++++++++++++++++++++++++++++++++++  [facility]  +++++++++++++++++++++++++++++++++++++++++++++++++  NOTE: This report was transcribed using voice recognition software. Every effort was made to ensure accuracy; however, inadvertent computerized transcription errors may be present.

## 2021-02-02 NOTE — DISCHARGE SUMMARY
Discharge Summary    Cristal Ma  :  1985  MRN:  [id number]    Admit date:  2021  Discharge date:  2021 11:28 AM    Admitting Physician:  Starla Shaw MD    Discharge Diagnoses:    Patient Active Problem List    Diagnosis Date Noted    ESRD needing dialysis ([facility] 2021    Volume overload 2021    Elevated troponin 2021    Gall stone 2021    Non-compliance 2021    AMS (altered mental status) 2021    Acute congestive heart failure ([facility] 2020    Anemia 2020    Symptomatic anemia 2020    Abscess 2020    Nasal congestion 2020    Dry cough 2020    Hidradenitis suppurativa 2020    Vitamin B deficiency, unspecified 2020    Periorbital cellulitis     Periorbital cellulitis of right eye 2019    Nasal polyp 2019    Acute hyperkalemia     Facial cellulitis 2018    Uncontrolled type 1 diabetes mellitus with hyperglycemia ([facility] 2018    Chronic deep vein thrombosis (DVT) of right upper extremity ([facility] 2018    HTN (hypertension), benign 10/17/2017    ESRD (end stage renal disease) on dialysis ([facility] 2016       Past Medical Hx :   Past Medical History:   Diagnosis Date    JOLLY (acute kidney injury) ([facility] 2016    AVF (arteriovenous fistula) ([facility] 2018    let arm    Chronic kidney disease     Dialysis AV fistula malfunction, initial encounter ([facility] 2019    DM type 1 (diabetes mellitus, type 1) ([facility]     Encounter regarding vascular access for dialysis for ESRD ([facility] 2018    ESRD (end stage renal disease) ([facility]     Hemodialysis patient ([facility]     amrita plattt mon wed fri / graft in left arm    Hypertension     Tobacco abuse 2016       Past Surgical Hx :   Past Surgical History:   Procedure Laterality Date     SECTION  2013    CYST INCISION AND DRAINAGE  2011    perirectal abscess. [facility].  Dr. Flakito Colindres CALCIUM 8.4 02/02/2021    GFRAA 10 02/02/2021    LABGLOM 10 02/02/2021    GLUCOSE 317 02/02/2021    GLUCOSE 279 07/27/2011        Radiology Results: Ct Abdomen Pelvis W Iv Contrast Additional Contrast? None    Result Date: 1/27/2021  EXAMINATION: CT OF THE ABDOMEN AND PELVIS WITH CONTRAST 1/27/2021 5:13 pm TECHNIQUE: CT of the abdomen and pelvis was performed with the administration of intravenous contrast. Multiplanar reformatted images are provided for review. Dose modulation, iterative reconstruction, and/or weight based adjustment of the mA/kV was utilized to reduce the radiation dose to as low as reasonably achievable. COMPARISON: None. HISTORY: ORDERING SYSTEM PROVIDED HISTORY: left groin abscess, r/o gas, TECHNOLOGIST PROVIDED HISTORY: Additional Contrast?->None Reason for exam:->left groin abscess, r/o gas, What reading provider will be dictating this exam?->CRC FINDINGS: Lower Chest: Increased interstitial markings and chronic parenchymal changes at the lung bases. Small right pleural effusion. Enlarged subcarinal lymph node measuring 2.7 x 2.4 cm noted. Organs: No focal liver lesions. Gallstone versus sludge noted in the gallbladder. Mild pericholecystic fluid collection. Spleen is not enlarged. Pancreas shows normal contour. Adrenal glands appear unremarkable. The kidneys are atrophic bilaterally. GI/Bowel: No obstructing or constricting mass lesions. Pelvis: Incompletely distended bladder with thickening of the bladder wall could be due to pseudo thickening or cystitis. Mild-to-moderate ascites. Peritoneum/Retroperitoneum: Ascites. No free air or significant adenopathy. Bones/Soft Tissues: Diffuse subcutaneous edema. Orthopedic hardware through the sacrum and SI joints. Mild to moderate ascites and diffuse subcutaneous edema. Severely atrophic kidneys. Gallstones/gallbladder sludge.  Parenchymal changes and interstitial infiltrates in the visualized lung bases with a small right pleural effusion. Partially visualized enlarged subcarinal lymph node. Consider chest CT for complete evaluation of the chest.    Xr Chest Portable    Result Date: 2021  EXAMINATION: ONE XRAY VIEW OF THE CHEST 2021 2:46 pm COMPARISON: 2020 HISTORY: ORDERING SYSTEM PROVIDED HISTORY: sob, CHF TECHNOLOGIST PROVIDED HISTORY: Reason for exam:->sob, CHF What reading provider will be dictating this exam?->CRC FINDINGS: The heart is enlarged. There are bilateral infiltrates. The costophrenic angles are mildly blunted. No pneumothorax. CHF. Superimposed pneumonia cannot be excluded. Us Dup Lower Extremities Bilateral Venous    Result Date: 2021  Patient MRN:  [id number] : 1985 Age: 38 years Gender: Female Order Date:  2021 EXAM: US DUP LOWER EXTREMITIES BILATERAL VENOUS NUMBER OF IMAGES:  40 INDICATION:  DVT DVT COMPARISON: None Within the visualized vessels, there is no evidence for deep venous thrombosis There is good compressibility, there is good augmentation, there is good color flow. Within the visualized vessels there is no evidence for deep venous thrombosis      Hospital Course:  SYNOPSIS: Patient admitted on 2021   Patient with end-stage renal disease on peritoneal dialysis. Missed dialysis on several days. Generalized edema. Shortness of breath and cough.   Has an abscess in the groin area.     : Current status summarized  Nephrology indicates that she has been undergoing dialysis treatments  Hydralazine increased to 75 mg 3 times a day   Blood sugars need better control  Left groin abscess has been drained   There are no current labs we will update these  Her last hemoglobin was 7.8  Last BUN 21 and last creatinine 6.4 these are dated day before yesterday      2/2  Patient seen in dialysis  Currently nephrology has approved discharge  Patient feels fine and wants to go home    History of Present Ilness:  From 2021 note:  Anthony Huerta is a 38 y.o. female with prior history of ESRD dependent on IHD, sec to Type 1 DM requiring RRT via Home Hemodialysis. Pt has been noncompliant and has not had a treatment since last Friday, 1/22, she states due to a vaginal abscess which causes it to be too painful to sit up to perform dialysis. Other pmh includes: HTN, CHF anemia, active smoker, history of pericardial effusion s/t window, and history of catheter related infection. She arrived at ER yesterday with c/o sob and wound check, c/o a boil on her left leg that is draining. Significant labs include: Na 132, K+ 5.9, BUN 54, Cr 14.9, eGFR 3, glucose 475, ca+ 9.2, Pro-BNP 31,163, trop 0.13, alb 3.5, HgbA1C 9.1. Vital signs: T 96.9, R 24, P 85, /64, 100% on O2-4L. Upon assessment, pt states she usually does her home hemo 4 times a week, but no specific days and reports she couldn't do dialysis because of the pain she is having in her left groin panty line region, stating she's had this boil since Friday and can not sit up due to the pain.       Interval History:  2/1/21- She is feeling better, less short of breath today. 2/2/2021: examined during HD treatment. She states she is feeling much better and would like to go home today. She wishes to continue home hemodialysis. Discussed the need to decrease fluid intake and to do the home treatments as prescribed. Assessment & Plan:  1. ESRD  dependent on IHD via Home IHD on 4 days per week with 3 hour treatments using the LUE AVF   Has been noncompliant with treatments-most recent hd as outpt was Friday, 1/22.   She has had treatments 3 days in a row for clearance and volume reduction  SW assistance as she has stated she wants to go back to in center HD and does not want PD  HD today - UFR set for 3800ml  She now wants to continue Home HD treatment for now.     2. Htn with CKD 5/ESRD  BP at/near goal <140/90  Volume removal on IHD  Continue home meds: amlodipine 5 mg and carvedilol 25 mg bid, added hydralazine 50 mg Q 8 hrs 1/29 1/31 Hydralazine increased to 75 mg tid with improvement in BP     3. DM   uncontrolled as evidenced by HgbA1c 9.1   Blood sugar in   Primary following     4. Anemia in CKD  Hgb below goal >10  Transfuse < 7.0  Iron 50, iron sat 29, TIBC 173, folate 19.4, Vit B-12 784, Ferritin 613  Check stool for OB  Start VALENTIN     5. Sec HPTH of renal origin  PO4 5.7        6. Left groin abscess  I&D drained 1/29  On [facility] for discharge from Renal standpoint.  Patient to follow-up with [facility] Medications:    Paige Coleman   Home Medication Instructions GOV:[id number]    Printed on:02/02/21 1128   Medication Information                      albuterol sulfate  (90 Base) MCG/ACT inhaler  Inhale 2 puffs into the lungs every 4-6 hours as needed for Wheezing or Shortness of Breath             amLODIPine (NORVASC) 5 MG tablet  Take 5 mg by mouth daily              amoxicillin-clavulanate (AUGMENTIN) 875-125 MG per tablet  Take 1 tablet by mouth every 12 hours for 10 days             B Complex-C-Folic Acid (YOLANDA-JACOB) TABS  Take 1 tablet by mouth Daily with supper             carvedilol (COREG) 25 MG tablet  Take 25 mg by mouth 2 times daily              Cholecalciferol (VITAMIN D3) 50 MCG (2000 UT) CAPS  Take 2,000 Units by mouth daily             famotidine (PEPCID) 20 MG tablet  Take 1 tablet by mouth every morning             hydrALAZINE (APRESOLINE) 50 MG tablet  Take 1 tablet by mouth every 8 hours             Insulin Glargine, 2 Unit Dial, (TOUJEO MAX SOLOSTAR) 300 UNIT/ML SOPN  Inject 20 Units into the skin nightly             insulin lispro, 1 Unit Dial, (HUMALOG KWIKPEN) 100 UNIT/ML SOPN  Inject 10 Units into the skin 3 times daily With Lunch,Dinner,Snack             medroxyPROGESTERone (DEPO-PROVERA) 150 MG/ML injection  Inject 150 mg into the muscle every 3 months                  Discharge Exam:  General appearance: Obese no apparent distress, appears stated age and cooperative. HEENT:  Conjunctivae/corneas clear. Neck: Supple. No jugular venous distention.    Respiratory: Bilateral rhonchi  Cardiovascular: Regular rate rhythm, normal S1-S2  Abdomen: Soft, nontender, nondistended  Musculoskeletal: Edema  Skin:  No rashes  on visible skin  Neurologic: awake, alert and following commands       Disposition: home    Patient Instructions:   REFER TO AVR or SYLVIE document    Signed:  Sophia Rowell of Internal Medicine  [employer] Board [employer] Ger[employer]  2/2/2021, 11:28 AM

## 2021-02-02 NOTE — PROGRESS NOTES
Pharmacy Consultation Note  (Antibiotic Dosing and Monitoring)    Initial consult date: 1/27/21  Consulting physician: Doll Eye  Drug: Vancomycin  Indication: Thigh/groin abscess    Age/  Gender Height Weight IBW Dosing weight  kg  Allergy Information   35 y.o./female 4' 11\" (149.9 cm) 135 lb (61.2 kg)     Patient must be at least 60 in tall to calculate ideal body weight  78.7  Patient has no known allergies. Other anti-infectives Start date Stop date   Zosyn 1/27 1/30   Augmentin 1/30 (refused all doses after 1/30)               Date  Tmax WBC BUN/CR CrCL  (mL/min) Drug/Dose Time   Given Level(s)   (Time) Comments   1/28 afebrile 7.5 ESRD HD x 4 hr 0630 to 1030  Vancomycin 1250 mg x 1 1245     1/29 afebrile 6.1 ESRD HD X 4 hr 0700 to 1100 No dose  27.3 @0614 random    1/30 afebrile 9.6 ESRD HD x 3 hr 730 to 1030 Vancomycin 750 mg x 1 1749 17.5 @0725 random    1/31 afebrile -- ESRD -- No Dose      2/1 afebrile 6.2 ESRD Hd x 4 hr 1300 to 1700 Vancomycin 500 mg x1 1830     2/2 afebrile 5.4 ESRD HD x 4 hr 700 to 1100  Vancomycin 750 mg x1 (1300) 22.3 @0558 random                     Intake/Output Summary (Last 24 hours) at 2/2/2021 9948  Last data filed at 2/2/2021 0650  Gross per 24 hour   Intake 1670 ml   Output 4300 ml   Net -2630 ml       Average urine output:    Cultures:    Site Date Result   Blood 1 1/27 5 days no growth   Blood 2 1/27 5 days no growth     No results for input(s): BLOODCULT2 in the last 72 hours. Historical Cultures:  WOUND  Organism   Date Value Ref Range Status   01/29/2021 Corynebacterium species (A)  Final   01/29/2021 Corynebacterium species (A)  Final   01/29/2021 Corynebacterium species (A)  Final     No results for input(s): BC in the last 72 hours. Radiology:      Assessment:  · Pharmacy consulted to dose vancomycin for this 38 y.o. female for thigh/groin abscess. · Goal trough = 15-20 mcg/mL. · IHD today X 4 hr.      Plan:  · Pharmacist will follow and monitor/adjust

## 2021-02-26 PROBLEM — R79.89 ELEVATED TROPONIN: Status: RESOLVED | Noted: 2021-01-27 | Resolved: 2021-02-26

## 2021-02-26 PROBLEM — R77.8 ELEVATED TROPONIN: Status: RESOLVED | Noted: 2021-01-27 | Resolved: 2021-02-26

## 2021-05-19 ENCOUNTER — APPOINTMENT (OUTPATIENT)
Dept: CT IMAGING | Age: 36
DRG: 579 | End: 2021-05-19
Payer: MEDICARE

## 2021-05-19 ENCOUNTER — HOSPITAL ENCOUNTER (INPATIENT)
Age: 36
LOS: 3 days | Discharge: HOME | DRG: 579 | End: 2021-05-22
Attending: EMERGENCY MEDICINE | Admitting: INTERNAL MEDICINE
Payer: MEDICARE

## 2021-05-19 DIAGNOSIS — N18.6 ESRD (END STAGE RENAL DISEASE) (HCC): ICD-10-CM

## 2021-05-19 DIAGNOSIS — L02.214 ABSCESS OF RIGHT GROIN: Primary | ICD-10-CM

## 2021-05-19 LAB
ALBUMIN SERPL-MCNC: 3.5 G/DL (ref 3.5–5.2)
ALP BLD-CCNC: 122 U/L (ref 35–104)
ALT SERPL-CCNC: 32 U/L (ref 0–32)
ANION GAP SERPL CALCULATED.3IONS-SCNC: 14 MMOL/L (ref 7–16)
AST SERPL-CCNC: 34 U/L (ref 0–31)
BASOPHILS ABSOLUTE: 0.01 E9/L (ref 0–0.2)
BASOPHILS RELATIVE PERCENT: 0.1 % (ref 0–2)
BILIRUB SERPL-MCNC: 0.4 MG/DL (ref 0–1.2)
BUN BLDV-MCNC: 29 MG/DL (ref 6–20)
CALCIUM SERPL-MCNC: 9.1 MG/DL (ref 8.6–10.2)
CHLORIDE BLD-SCNC: 93 MMOL/L (ref 98–107)
CHP ED QC CHECK: NORMAL
CO2: 30 MMOL/L (ref 22–29)
CREAT SERPL-MCNC: 8 MG/DL (ref 0.5–1)
EOSINOPHILS ABSOLUTE: 0.17 E9/L (ref 0.05–0.5)
EOSINOPHILS RELATIVE PERCENT: 2.1 % (ref 0–6)
GFR AFRICAN AMERICAN: 7
GFR NON-AFRICAN AMERICAN: 7 ML/MIN/1.73
GLUCOSE BLD-MCNC: 216 MG/DL (ref 74–99)
GLUCOSE BLD-MCNC: 289 MG/DL
HCT VFR BLD CALC: 24.7 % (ref 34–48)
HEMOGLOBIN: 8.5 G/DL (ref 11.5–15.5)
IMMATURE GRANULOCYTES #: 0.06 E9/L
IMMATURE GRANULOCYTES %: 0.7 % (ref 0–5)
LACTIC ACID: 1.8 MMOL/L (ref 0.5–2.2)
LYMPHOCYTES ABSOLUTE: 1.07 E9/L (ref 1.5–4)
LYMPHOCYTES RELATIVE PERCENT: 12.9 % (ref 20–42)
MCH RBC QN AUTO: 31.4 PG (ref 26–35)
MCHC RBC AUTO-ENTMCNC: 34.4 % (ref 32–34.5)
MCV RBC AUTO: 91.1 FL (ref 80–99.9)
METER GLUCOSE: 102 MG/DL (ref 74–99)
METER GLUCOSE: 130 MG/DL (ref 74–99)
METER GLUCOSE: 289 MG/DL (ref 74–99)
METER GLUCOSE: <40 MG/DL (ref 74–99)
MONOCYTES ABSOLUTE: 0.48 E9/L (ref 0.1–0.95)
MONOCYTES RELATIVE PERCENT: 5.8 % (ref 2–12)
NEUTROPHILS ABSOLUTE: 6.5 E9/L (ref 1.8–7.3)
NEUTROPHILS RELATIVE PERCENT: 78.4 % (ref 43–80)
PDW BLD-RTO: 16.4 FL (ref 11.5–15)
PLATELET # BLD: 181 E9/L (ref 130–450)
PMV BLD AUTO: 10.4 FL (ref 7–12)
POTASSIUM SERPL-SCNC: 3.9 MMOL/L (ref 3.5–5)
RBC # BLD: 2.71 E12/L (ref 3.5–5.5)
SODIUM BLD-SCNC: 137 MMOL/L (ref 132–146)
TOTAL PROTEIN: 7.2 G/DL (ref 6.4–8.3)
WBC # BLD: 8.3 E9/L (ref 4.5–11.5)

## 2021-05-19 PROCEDURE — 6370000000 HC RX 637 (ALT 250 FOR IP): Performed by: NURSE PRACTITIONER

## 2021-05-19 PROCEDURE — 80053 COMPREHEN METABOLIC PANEL: CPT

## 2021-05-19 PROCEDURE — 2580000003 HC RX 258: Performed by: INTERNAL MEDICINE

## 2021-05-19 PROCEDURE — 2500000003 HC RX 250 WO HCPCS: Performed by: STUDENT IN AN ORGANIZED HEALTH CARE EDUCATION/TRAINING PROGRAM

## 2021-05-19 PROCEDURE — 87205 SMEAR GRAM STAIN: CPT

## 2021-05-19 PROCEDURE — 87186 SC STD MICRODIL/AGAR DIL: CPT

## 2021-05-19 PROCEDURE — 6360000002 HC RX W HCPCS: Performed by: EMERGENCY MEDICINE

## 2021-05-19 PROCEDURE — 2060000000 HC ICU INTERMEDIATE R&B

## 2021-05-19 PROCEDURE — 2500000003 HC RX 250 WO HCPCS: Performed by: INTERNAL MEDICINE

## 2021-05-19 PROCEDURE — 87075 CULTR BACTERIA EXCEPT BLOOD: CPT

## 2021-05-19 PROCEDURE — 74177 CT ABD & PELVIS W/CONTRAST: CPT

## 2021-05-19 PROCEDURE — 82962 GLUCOSE BLOOD TEST: CPT

## 2021-05-19 PROCEDURE — 99283 EMERGENCY DEPT VISIT LOW MDM: CPT

## 2021-05-19 PROCEDURE — 85025 COMPLETE CBC W/AUTO DIFF WBC: CPT

## 2021-05-19 PROCEDURE — 87040 BLOOD CULTURE FOR BACTERIA: CPT

## 2021-05-19 PROCEDURE — 0J9C0ZZ DRAINAGE OF PELVIC REGION SUBCUTANEOUS TISSUE AND FASCIA, OPEN APPROACH: ICD-10-PCS | Performed by: STUDENT IN AN ORGANIZED HEALTH CARE EDUCATION/TRAINING PROGRAM

## 2021-05-19 PROCEDURE — 87070 CULTURE OTHR SPECIMN AEROBIC: CPT

## 2021-05-19 PROCEDURE — 93005 ELECTROCARDIOGRAM TRACING: CPT | Performed by: INTERNAL MEDICINE

## 2021-05-19 PROCEDURE — 6360000004 HC RX CONTRAST MEDICATION: Performed by: RADIOLOGY

## 2021-05-19 PROCEDURE — 6360000002 HC RX W HCPCS: Performed by: NURSE PRACTITIONER

## 2021-05-19 PROCEDURE — 6360000002 HC RX W HCPCS: Performed by: INTERNAL MEDICINE

## 2021-05-19 PROCEDURE — 83605 ASSAY OF LACTIC ACID: CPT

## 2021-05-19 PROCEDURE — 87077 CULTURE AEROBIC IDENTIFY: CPT

## 2021-05-19 PROCEDURE — 2580000003 HC RX 258: Performed by: NURSE PRACTITIONER

## 2021-05-19 RX ORDER — DEXTROSE MONOHYDRATE 50 MG/ML
100 INJECTION, SOLUTION INTRAVENOUS PRN
Status: DISCONTINUED | OUTPATIENT
Start: 2021-05-19 | End: 2021-05-22 | Stop reason: HOSPADM

## 2021-05-19 RX ORDER — NICOTINE POLACRILEX 4 MG
15 LOZENGE BUCCAL PRN
Status: DISCONTINUED | OUTPATIENT
Start: 2021-05-19 | End: 2021-05-22 | Stop reason: HOSPADM

## 2021-05-19 RX ORDER — ACETAMINOPHEN 650 MG/1
650 SUPPOSITORY RECTAL EVERY 6 HOURS PRN
Status: DISCONTINUED | OUTPATIENT
Start: 2021-05-19 | End: 2021-05-22 | Stop reason: HOSPADM

## 2021-05-19 RX ORDER — CHOLECALCIFEROL (VITAMIN D3) 50 MCG
2000 TABLET ORAL DAILY
Status: DISCONTINUED | OUTPATIENT
Start: 2021-05-19 | End: 2021-05-22 | Stop reason: HOSPADM

## 2021-05-19 RX ORDER — SODIUM CHLORIDE 0.9 % (FLUSH) 0.9 %
5-40 SYRINGE (ML) INJECTION EVERY 12 HOURS SCHEDULED
Status: DISCONTINUED | OUTPATIENT
Start: 2021-05-19 | End: 2021-05-22 | Stop reason: HOSPADM

## 2021-05-19 RX ORDER — LIDOCAINE HYDROCHLORIDE AND EPINEPHRINE 10; 10 MG/ML; UG/ML
20 INJECTION, SOLUTION INFILTRATION; PERINEURAL ONCE
Status: COMPLETED | OUTPATIENT
Start: 2021-05-19 | End: 2021-05-19

## 2021-05-19 RX ORDER — ALBUTEROL SULFATE 2.5 MG/3ML
2.5 SOLUTION RESPIRATORY (INHALATION) EVERY 4 HOURS PRN
Status: DISCONTINUED | OUTPATIENT
Start: 2021-05-19 | End: 2021-05-22 | Stop reason: HOSPADM

## 2021-05-19 RX ORDER — ONDANSETRON 2 MG/ML
4 INJECTION INTRAMUSCULAR; INTRAVENOUS EVERY 6 HOURS PRN
Status: DISCONTINUED | OUTPATIENT
Start: 2021-05-19 | End: 2021-05-22 | Stop reason: HOSPADM

## 2021-05-19 RX ORDER — CARVEDILOL 25 MG/1
25 TABLET ORAL NIGHTLY
Status: DISCONTINUED | OUTPATIENT
Start: 2021-05-19 | End: 2021-05-22 | Stop reason: HOSPADM

## 2021-05-19 RX ORDER — DIPHENHYDRAMINE HYDROCHLORIDE 50 MG/ML
25 INJECTION INTRAMUSCULAR; INTRAVENOUS ONCE
Status: COMPLETED | OUTPATIENT
Start: 2021-05-19 | End: 2021-05-19

## 2021-05-19 RX ORDER — CARVEDILOL 25 MG/1
50 TABLET ORAL EVERY MORNING
Status: DISCONTINUED | OUTPATIENT
Start: 2021-05-20 | End: 2021-05-22 | Stop reason: HOSPADM

## 2021-05-19 RX ORDER — ACETAMINOPHEN 325 MG/1
650 TABLET ORAL EVERY 6 HOURS PRN
Status: DISCONTINUED | OUTPATIENT
Start: 2021-05-19 | End: 2021-05-22 | Stop reason: HOSPADM

## 2021-05-19 RX ORDER — SODIUM CHLORIDE 0.9 % (FLUSH) 0.9 %
10 SYRINGE (ML) INJECTION PRN
Status: DISCONTINUED | OUTPATIENT
Start: 2021-05-19 | End: 2021-05-19 | Stop reason: SDUPTHER

## 2021-05-19 RX ORDER — CARVEDILOL 25 MG/1
25 TABLET ORAL NIGHTLY
COMMUNITY
End: 2021-05-27

## 2021-05-19 RX ORDER — CLINDAMYCIN PHOSPHATE 900 MG/50ML
900 INJECTION INTRAVENOUS ONCE
Status: COMPLETED | OUTPATIENT
Start: 2021-05-19 | End: 2021-05-19

## 2021-05-19 RX ORDER — HEPARIN SODIUM 10000 [USP'U]/ML
5000 INJECTION, SOLUTION INTRAVENOUS; SUBCUTANEOUS EVERY 8 HOURS
Status: DISCONTINUED | OUTPATIENT
Start: 2021-05-20 | End: 2021-05-22 | Stop reason: HOSPADM

## 2021-05-19 RX ORDER — PROMETHAZINE HYDROCHLORIDE 25 MG/1
12.5 TABLET ORAL EVERY 6 HOURS PRN
Status: DISCONTINUED | OUTPATIENT
Start: 2021-05-19 | End: 2021-05-22 | Stop reason: HOSPADM

## 2021-05-19 RX ORDER — SODIUM CHLORIDE 0.9 % (FLUSH) 0.9 %
5-40 SYRINGE (ML) INJECTION PRN
Status: DISCONTINUED | OUTPATIENT
Start: 2021-05-19 | End: 2021-05-22 | Stop reason: HOSPADM

## 2021-05-19 RX ORDER — AMLODIPINE BESYLATE 5 MG/1
5 TABLET ORAL DAILY
Status: DISCONTINUED | OUTPATIENT
Start: 2021-05-19 | End: 2021-05-21

## 2021-05-19 RX ORDER — SODIUM CHLORIDE 9 MG/ML
25 INJECTION, SOLUTION INTRAVENOUS PRN
Status: DISCONTINUED | OUTPATIENT
Start: 2021-05-19 | End: 2021-05-22 | Stop reason: HOSPADM

## 2021-05-19 RX ORDER — INSULIN GLARGINE 100 [IU]/ML
16 INJECTION, SOLUTION SUBCUTANEOUS NIGHTLY
Status: DISCONTINUED | OUTPATIENT
Start: 2021-05-19 | End: 2021-05-22 | Stop reason: HOSPADM

## 2021-05-19 RX ORDER — POLYETHYLENE GLYCOL 3350 17 G/17G
17 POWDER, FOR SOLUTION ORAL DAILY PRN
Status: DISCONTINUED | OUTPATIENT
Start: 2021-05-19 | End: 2021-05-22 | Stop reason: HOSPADM

## 2021-05-19 RX ORDER — FAMOTIDINE 20 MG/1
10 TABLET, FILM COATED ORAL EVERY MORNING
Status: DISCONTINUED | OUTPATIENT
Start: 2021-05-20 | End: 2021-05-22 | Stop reason: HOSPADM

## 2021-05-19 RX ORDER — CLINDAMYCIN PHOSPHATE 300 MG/50ML
300 INJECTION INTRAVENOUS ONCE
Status: DISCONTINUED | OUTPATIENT
Start: 2021-05-19 | End: 2021-05-19

## 2021-05-19 RX ORDER — DEXTROSE MONOHYDRATE 25 G/50ML
12.5 INJECTION, SOLUTION INTRAVENOUS PRN
Status: DISCONTINUED | OUTPATIENT
Start: 2021-05-19 | End: 2021-05-22 | Stop reason: HOSPADM

## 2021-05-19 RX ORDER — HYDRALAZINE HYDROCHLORIDE 25 MG/1
50 TABLET, FILM COATED ORAL EVERY 8 HOURS SCHEDULED
Status: DISCONTINUED | OUTPATIENT
Start: 2021-05-19 | End: 2021-05-22 | Stop reason: HOSPADM

## 2021-05-19 RX ADMIN — HYDRALAZINE HYDROCHLORIDE 50 MG: 25 TABLET, FILM COATED ORAL at 23:37

## 2021-05-19 RX ADMIN — VANCOMYCIN HYDROCHLORIDE 1000 MG: 1 INJECTION, POWDER, LYOPHILIZED, FOR SOLUTION INTRAVENOUS at 15:13

## 2021-05-19 RX ADMIN — PIPERACILLIN SODIUM AND TAZOBACTAM SODIUM 4500 MG: 4; .5 INJECTION, POWDER, LYOPHILIZED, FOR SOLUTION INTRAVENOUS at 15:14

## 2021-05-19 RX ADMIN — INSULIN GLARGINE 16 UNITS: 100 INJECTION, SOLUTION SUBCUTANEOUS at 21:04

## 2021-05-19 RX ADMIN — ACETAMINOPHEN 650 MG: 325 TABLET ORAL at 20:56

## 2021-05-19 RX ADMIN — LIDOCAINE HYDROCHLORIDE AND EPINEPHRINE 20 ML: 10; 10 INJECTION, SOLUTION INFILTRATION; PERINEURAL at 18:17

## 2021-05-19 RX ADMIN — CLINDAMYCIN PHOSPHATE 900 MG: 900 INJECTION, SOLUTION INTRAVENOUS at 15:16

## 2021-05-19 RX ADMIN — IOPAMIDOL 90 ML: 755 INJECTION, SOLUTION INTRAVENOUS at 16:51

## 2021-05-19 RX ADMIN — Medication 10 ML: at 20:57

## 2021-05-19 RX ADMIN — DIPHENHYDRAMINE HYDROCHLORIDE 25 MG: 50 INJECTION, SOLUTION INTRAMUSCULAR; INTRAVENOUS at 16:19

## 2021-05-19 RX ADMIN — HEPARIN SODIUM 5000 UNITS: 10000 INJECTION INTRAVENOUS; SUBCUTANEOUS at 23:37

## 2021-05-19 RX ADMIN — CARVEDILOL 25 MG: 25 TABLET, FILM COATED ORAL at 20:56

## 2021-05-19 ASSESSMENT — ENCOUNTER SYMPTOMS
CHEST TIGHTNESS: 0
VOMITING: 0
NAUSEA: 0
SHORTNESS OF BREATH: 0

## 2021-05-19 ASSESSMENT — PAIN SCALES - GENERAL
PAINLEVEL_OUTOF10: 3
PAINLEVEL_OUTOF10: 0

## 2021-05-19 NOTE — ED NOTES
Name: Majo Montiel  : 1985  MRN: [id number]    Date: 2021    Benefits of immediately proceeding with Radiology exam outweigh the risks and therefore the following is being waived:      [x] Pregnancy test    [] Protocol for Iodine allergy    [] MRI questionnaire    [x] BUN/Creatinine        DO Antoinette Burris DO  21 2408

## 2021-05-19 NOTE — ED NOTES
The patient states that she does not want another Covid test related to the fact she was tested at dialysis and she has had her vaccine. I attempted to explain to the patient that the vaccine does not make her immune but will lessen the impact of the viral infection.  She still refused to have her nose swabbed again      Bianca Grier RN  05/19/21 5895

## 2021-05-19 NOTE — PROCEDURES
Incision and Drainage Procedure Note    Indication: Abscess    Procedure: The patient was positioned appropriately and the skin over the incision site was prepped with betadine and draped in a sterile fashion. Local anesthesia was obtained by infiltration using 1% Lidocaine with epinephrine. An incision was then made over the apex of the lesion and approximately 5 cc of purulent material was expressed. Loculations were broken up using forceps and more of the material was able to be expressed. The drainage cavity was then irrigated, packed with sterile gauze and dressed with a sterile dressing. The patient tolerated the procedure well. Complications: None    Dr. Viky Sanders was immediately available for procedure.     Electronically signed by Danielle Castaneda MD on 5/19/21 at 6:18 PM EDT

## 2021-05-19 NOTE — CONSULTS
GENERAL SURGERY  CONSULT NOTE  2021    Physician Consulted: Dr. Jillian Nelson  Reason for Consult: Right thigh abscess  Referring Physician: Dr. Vanessa Freitas    KASANDRA Sunshine is a 38 y.o. female with history as below who presented for evaluation of a right medial thigh abscess. She states she first noticed a small lump on the area on Saturday. It progressively worsened until it was very large and painful on Monday then yesterday it broke open and drained a copious amount of purulent fluid. She states this is very similar to her abscess on the other side that needed drainage and debridement in the OR. She states her blood sugar has been out of control recently due to this. She denies any specific trauma to the area. She denies any fevers or chills at home. No abdominal pain, nausea or vomiting. Past Medical History:   Diagnosis Date    JOLLY (acute kidney injury) ([address] 2016    AVF (arteriovenous fistula) ([address] 2018    let arm    Chronic kidney disease     Dialysis AV fistula malfunction, initial encounter ([address] 2019    DM type 1 (diabetes mellitus, type 1) ([address]     Encounter regarding vascular access for dialysis for ESRD ([address] 2018    ESRD (end stage renal disease) ([address]     Hemodialysis patient ([address]     amrita dawson  / graft in left arm    Hypertension     Tobacco abuse 2016       Past Surgical History:   Procedure Laterality Date     SECTION  2013    CYST INCISION AND DRAINAGE  2011    perirectal abscess. [facility].  Dr. Ashutosh Flroes Left 2019    LEFT ARM FISTULAGRAM, BALLOON ANGIOPLASTY performed by Tammy Lowry MD at [facility]      FRACTURE SURGERY      fracture right ulnar, left tibia, and pelvis    OTHER SURGICAL HISTORY      open reduction internal fixation left radial shaft    OTHER SURGICAL HISTORY  2016    pericardial window  OTHER SURGICAL HISTORY  08/23/2016     r tesio insertion  / remove 8/16/2018    OTHER SURGICAL HISTORY Left 02/17/2017    insertion a-v fistula left arm    MD ANASTOMOSIS,AV,ANY SITE Left 7/17/2018    REVISION AV FISTULA - LEFT ARM performed by Janice Cary MD at [facility] NON-TUNNEL CV CATH N/A 5/10/2018    TESIO CATHETER INSERTION performed by Jane Chambers MD at [address] PRERETINAL MEMBRANE Left 8/28/2018    PARS PLANA VITRECTOMY 25 GAUGE, VITREOUS HEMORRHAGE REMOVAL, ENDO LASER, AIR/FLUID  EXCHANGE LEFT EYE performed by Fabienne Cao MD at [address] N/A 1/29/2021    PERINEAL ABCESS INCISION AND DRAINAGE (LITHOTOMY) performed by Carlie Patrick MD at [address]       Medications Prior to Admission:    Prior to Admission medications    Medication Sig Start Date End Date Taking?  Authorizing Provider   B Complex-C-Folic Acid (YOLANDA-JACOB) TABS TAKE 1 TABLET BY MOUTH DAILY WITH SUPPER 3/12/21   KEMI Ramirez CNP   hydrALAZINE (APRESOLINE) 50 MG tablet Take 1 tablet by mouth every 8 hours 1/30/21   Ayala Alegria,    Cholecalciferol (VITAMIN D3) 50 MCG (2000 UT) CAPS Take 2,000 Units by mouth daily    Historical Provider, MD   Insulin Glargine, 2 Unit Dial, (TOUJEO MAX SOLOSTAR) 300 UNIT/ML SOPN Inject 20 Units into the skin nightly    Historical Provider, MD   insulin lispro, 1 Unit Dial, (HUMALOG KWIKPEN) 100 UNIT/ML SOPN Inject 10 Units into the skin 3 times daily With Lunch,Dinner,Snack    Historical Provider, MD   amLODIPine (NORVASC) 5 MG tablet Take 5 mg by mouth daily  3/13/20   Historical Provider, MD   famotidine (PEPCID) 20 MG tablet Take 1 tablet by mouth every morning 2/25/20   KEMI Ramirez CNP   medroxyPROGESTERone (DEPO-PROVERA) 150 MG/ML injection Inject 150 mg into the muscle every 3 months  11/17/19   Historical Provider, MD   albuterol sulfate  (90 Base) MCG/ACT inhaler Inhale 2 puffs into the lungs every 4-6 hours as needed for Wheezing or Shortness of Breath 12/5/19   Caro Elmore PA-C   carvedilol (COREG) 25 MG tablet Take 25 mg by mouth 2 times daily  7/7/19   Historical Provider, MD       No Known Allergies    Family History   Problem Relation Age of Onset    Stroke Mother     Cancer Father     Diabetes Paternal Aunt        Social History     Tobacco Use    Smoking status: Light Tobacco Smoker     Years: 15.00     Types: Cigarettes    Smokeless tobacco: Never Used    Tobacco comment: PPMonth   Vaping Use    Vaping Use: Never used   Substance Use Topics    Alcohol use: Not Currently     Alcohol/week: 0.0 standard drinks    Drug use: No         Review of Systems   General ROS: negative  Hematological and Lymphatic ROS: negative  Respiratory ROS: negative  Cardiovascular ROS: negative  Gastrointestinal ROS: negative  Genito-Urinary ROS: negative  Musculoskeletal ROS: negative      PHYSICAL EXAM:    Vitals:    05/19/21 1222   BP: (!) 117/58   Pulse: 98   Resp: 18   Temp: 97.1 °F (36.2 °C)   SpO2: 99%       General Appearance:  awake, alert, oriented, in no acute distress  Skin:  Skin color, texture, turgor normal.   Head/face:  NCAT  Eyes:  No gross abnormalities. Lungs:  Normal expansion. Clear to auscultation. Heart:  Heart regular rate  Abdomen:  Soft, non-tender, normal bowel sounds. No bruits, organomegaly or masses. Extremities: inner right groin with area of induration and erythema. Open area where drainage occurred is visible, no current drainage    LABS:    CBC  Recent Labs     05/19/21  1326   WBC 8.3   HGB 8.5*   HCT 24.7*        BMP  Recent Labs     05/19/21  1326      K 3.9   CL 93*   CO2 30*   BUN 29*   CREATININE 8.0*   CALCIUM 9.1     Liver Function  Recent Labs     05/19/21  1326   BILITOT 0.4   AST 34*   ALT 32   ALKPHOS 122*   PROT 7.2   LABALBU 3.5     No results for input(s): LACTATE in the last 72 hours.   No results for input(s): INR, PTT in the last 72 hours. Invalid input(s): PT    RADIOLOGY    No results found.       ASSESSMENT:  38 y.o. female with RLE abscess    PLAN:  - can admit to medicine  - bedside I&D  - antibiotics per primary service  - local wound care  - cultures    Discussed with Dr. Jackeline Chin    Electronically signed by Benigno Aguiar MD on 5/19/21 at 2:49 PM EDT

## 2021-05-19 NOTE — PROGRESS NOTES
Pharmacy Consultation Note  (Antibiotic Dosing and Monitoring)    Initial consult date: 21  Consulting physician:KEYA Maria  Drug(s): Vancomycin  Indication: SSTI    Ht Readings from Last 1 Encounters:   21 4' 11\" (1.499 m)       Age/Gender IBW DW  Allergy Information   38 y.o.  female  62.1 kg  Patient has no known allergies. Date  WBC BUN/sCr UOP (mL/kg/hr) Drug/Dose Time   Given Level(s)   (Time) Comments     (#1) 8.3 29/8.0 --- Vancomycin 1000 mg IV x1 1513       (#2)            (#3)            (#4)            (#5)            (#6)            Other Antibiotics/Antifungals/Antivirals Start Date Stop Date Comments   Zosyn      Clindamycin                      Estimated CrCL:    No intake or output data in the 24 hours ending 21 1833    Temp max: Temp (24hrs), Av.1 °F (36.2 °C), Min:97.1 °F (36.2 °C), Max:97.1 °F (36.2 °C)      Cultures:    Culture Date Result                              Assessment:  · Consulted by KEYA Barahona to dose/monitor vancomycin  · Goal trough level:  15-20 mcg/mL  · 35 YOF with right groin abscess started on Vancomycin/Zosyn: Day 1  · ESRD on IHD is due for HD today  · Received Vancomycin 1000 mg IV x1 in ED today  Plan:  · Random level tomorrow AM  · Future doses to be guided by levels and dialysis schedule, Random level tomorrow AM   · Pharmacist will follow and monitor/adjust dosing as necessary    Thank you for this consult, please call with questions.     Roc Call, PharmD 2021 6:33 PM  [phone]

## 2021-05-19 NOTE — ED NOTES
Pt c/o itching and SOB. Vanco stopped, notified Dr. Salena Chao. See new order to give IV benadryl.      Mauro Rizzo RN  05/19/21 0699

## 2021-05-19 NOTE — H&P
Hospitalist History & Physical      PCP: KEMI Latham - CNP    Date of Admission: 5/19/2021    Date of Service: Pt seen/examined on 5/19/2021 and is admitted to Inpatient with expected LOS greater than two midnights due to medical therapy. Chief Complaint:  had concerns including Abscess (Right groin, pain 10/10. States \"its seeping\"). History Of Present Illness:    Ms. Frantz Posey is a 38y.o. year old female  who  has a past medical history of JOLLY (acute kidney injury) ([facility] AVF (arteriovenous fistula) ([facility] Chronic kidney disease, Dialysis AV fistula malfunction, initial encounter ([facility] DM type 1 (diabetes mellitus, type 1) ([facility] Encounter regarding vascular access for dialysis for ESRD ([facility] ESRD (end stage renal disease) ([facility] Hemodialysis patient ([facility] Hypertension, and Tobacco abuse. She presented to the ER on 5/19/21 for evaluation of right groin abscess. She states that she noted swelling to her right groin on Saturday 5/15 however by Monday 5/17 it got \"really bad. \" She states that yesterday it opened up and had been draining copious amounts of pus. She states that it is very painful. She was admitted in January 2021 for perineal abscess for which she underwent I&D and was treated with vancomycin, zosyn and later augmentin upon discharge. The Right inner thigh/groin was noted to have erythema and and area of induration measuring approximately 10 x 10 cm with and open area centrally along the bikini line with purulent drainage. The entire inner thigh is painful to touch. VSS on arrival. Labs were significant for need for HD as well as glucose 216, Hgb 8.5, Hct 24.7. CT of the abdomen pelvis has been ordered but not yet obtained. General surgery was consulted and she was given zosyn, vancomycin and clindamycin while in the ER. Patient is to be admitted with ID, general surgery and nephrology consultation.       Past Medical History:        Diagnosis Date  [facility]LLY [facility]) [facility] 2016    AVF (arteriovenous fistula) ([facility] 2018    let arm    Chronic kidney disease     Dialysis AV fistula malfunction, initial encounter ([facility] 2019    DM type 1 (diabetes mellitus, type 1) ([facility]     Encounter regarding vascular access for dialysis for ESRD ([facility] 2018    ESRD (end stage renal disease) ([facility]     Hemodialysis patient ([facility]     amrita dawson  / graft in left arm    Hypertension     Tobacco abuse 2016       Past Surgical History:        Procedure Laterality Date     SECTION      CYST INCISION AND DRAINAGE  2011    perirectal abscess. [facility].  Dr. Travis Millan Left 2019    LEFT ARM FISTULAGRAM, BALLOON ANGIOPLASTY performed by Mikal Hou MD at [address]      FRACTURE SURGERY      fracture right ulnar, left tibia, and pelvis    OTHER SURGICAL HISTORY      open reduction internal fixation left radial shaft    OTHER SURGICAL HISTORY  2016    pericardial window    OTHER SURGICAL HISTORY  2016     r tesio insertion  / remove 2018    OTHER SURGICAL HISTORY Left 2017    insertion a-v fistula left arm    DC ANASTOMOSIS,AV,ANY SITE Left 2018    REVISION AV FISTULA - LEFT ARM performed by Mikal Hou MD at [facility] NON-TUNNEL CV CATH N/A 5/10/2018    TESIO CATHETER INSERTION performed by Shawanda Strong MD at [address] PRERETINAL MEMBRANE Left 2018    PARS PLANA VITRECTOMY 25 GAUGE, VITREOUS HEMORRHAGE REMOVAL, ENDO LASER, AIR/FLUID  EXCHANGE LEFT EYE performed by Melania Mason MD at [address] N/A 2021    PERINEAL ABCESS INCISION AND DRAINAGE (LITHOTOMY) performed by Jose Alfredo Spears MD at [address]       Medications Prior to Admission:      Prior to Admission medications    Medication Sig Start Date End Date Taking? Authorizing Provider   carvedilol (COREG) 25 MG tablet Take 25 mg by mouth nightly   Yes Historical Provider, MD   B Complex-JOSÉ LUIS-Folic Acid (YOLANDA-JACOB) TABS TAKE 1 TABLET BY MOUTH DAILY WITH SUPPER 3/12/21  Yes KEMI Addison CNP   hydrALAZINE (APRESOLINE) 50 MG tablet Take 1 tablet by mouth every 8 hours 1/30/21  Yes Shannon Michael DO   Cholecalciferol (VITAMIN D3) 50 MCG (2000 UT) CAPS Take 2,000 Units by mouth daily   Yes Historical Provider, MD   Insulin Glargine, 2 Unit Dial, (TOUJEO MAX SOLOSTAR) 300 UNIT/ML SOPN Inject 16 Units into the skin nightly    Yes Historical Provider, MD   insulin lispro, 1 Unit Dial, (HUMALOG KWIKPEN) 100 UNIT/ML SOPN Inject 10 Units into the skin 3 times daily (before meals) *Plus Sliding Scale*   Yes Historical Provider, MD   amLODIPine (NORVASC) 5 MG tablet Take 5 mg by mouth daily  3/13/20  Yes Historical Provider, MD   famotidine (PEPCID) 20 MG tablet Take 1 tablet by mouth every morning 2/25/20  Yes KEMI Addison CNP   medroxyPROGESTERone (DEPO-PROVERA) 150 MG/ML injection Inject 150 mg into the muscle every 3 months  11/17/19  Yes Historical Provider, MD   albuterol sulfate  (90 Base) MCG/ACT inhaler Inhale 2 puffs into the lungs every 4-6 hours as needed for Wheezing or Shortness of Breath 12/5/19  Yes Caro Elmore PA-C   carvedilol (COREG) 25 MG tablet Take 50 mg by mouth every morning  7/7/19  Yes Historical Provider, MD       Allergies:  Patient has no known allergies. Social History:    RESIDENCE: Home  TOBACCO:   reports that she has been smoking cigarettes. She has smoked for the past 15.00 years. She has never used smokeless tobacco.  ETOH:   reports previous alcohol use. Family History:    As follows:      Problem Relation Age of Onset   Bragg Saliva Stroke Mother     Cancer Father     Diabetes Paternal Aunt        REVIEW OF SYSTEMS:   Pertinent positives as noted in the HPI.  All other systems reviewed and negative. PHYSICAL EXAM:  BP (!) 117/58   Pulse 98   Temp 97.1 °F (36.2 °C) (Infrared)   Resp 18   Ht 4' 11\" (1.499 m)   Wt 137 lb (62.1 kg)   SpO2 99%   BMI 27.67 kg/m²   General appearance: No distress, appears stated age and cooperative. HEENT: Normal cephalic, atraumatic without obvious deformity. Pupils equal, round, and reactive to light. Facial edema noted. Neck: Supple, with full range of motion. No jugular venous distention. Trachea midline. Respiratory:  Clear to auscultation bilaterally. No apparent distress. Cardiovascular:  Regular rate and rhythm. S1, S2 without murmurs, rubs, or gallops. PV: Brisk capillary refill. +2 pedal and radial pulses bilaterally. + edema BLE; BISHNU AVF with + bruit, thrill  Abdomen: Soft, non-tender, non-distended. +BS  Musculoskeletal: No obvious deformities or erythematous or edematous joints. Skin: Normal skin color. Erythema, swelling with induration approximately 10 x 10 cm with central opening draining purulent fluid  Neurologic:  Neurovascularly intact without any focal sensory/motor deficits. Psychiatric: Alert and oriented, thought content appropriate, normal insight    Reviewed EKG and CXR personally      CBC:   Recent Labs     05/19/21  1326   WBC 8.3   RBC 2.71*   HGB 8.5*   HCT 24.7*   MCV 91.1   RDW 16.4*        BMP:   Recent Labs     05/19/21  1326      K 3.9   CL 93*   CO2 30*   BUN 29*   CREATININE 8.0*     LFT:  Recent Labs     05/19/21  1326   PROT 7.2   ALKPHOS 122*   ALT 32   AST 34*   BILITOT 0.4     CE:  No results for input(s): Burnice Hatillo in the last 72 hours. PT/INR: No results for input(s): INR, APTT in the last 72 hours. BNP: No results for input(s): BNP in the last 72 hours.   ESR:   Lab Results   Component Value Date    SEDRATE 83 (H) 11/28/2018     CRP:   Lab Results   Component Value Date    CRP 0.1 11/05/2019     D Dimer:   Lab Results   Component Value Date    DDIMER 279 08/16/2016      Folate and B12: Lab Results   Component Value Date    AMCSQAXV33 744 01/27/2021   ,   Lab Results   Component Value Date    FOLATE 19.4 01/27/2021     Lactic Acid:   Lab Results   Component Value Date    LACTA 1.8 05/19/2021     Thyroid Studies:   Lab Results   Component Value Date    TSH 3.850 01/27/2021       Oupatient labs:  Lab Results   Component Value Date    CHOL 155 10/08/2013    TRIG 68 10/08/2013    HDL 40 01/29/2021    LDLCALC 41 01/29/2021    TSH 3.850 01/27/2021    INR 1.1 11/26/2018    LABA1C 9.1 (H) 01/27/2021       Urinalysis:    Lab Results   Component Value Date    NITRU Negative 11/23/2017    WBCUA 2-5 11/23/2017    WBCUA NONE 11/21/2010    BACTERIA RARE 11/23/2017    RBCUA 5-10 11/23/2017    RBCUA 0-1 09/29/2013    BLOODU MODERATE 11/23/2017    SPECGRAV 1.015 11/23/2017    GLUCOSEU 500 11/23/2017    GLUCOSEU >=1000 11/21/2010       Imaging:  No results found. 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 Recently admitted in January of this year with left groin abscess s/p I&D. General surgery consulted. Given zosyn, vancomycin and clindamycin in ED. Continue vancomycin and zosyn. Consult ID. Blood cultures obtained, wound cultures to be obtained per surgery. ESRD on home HD via BISHNU AVF, follows with [facility]. Due for HD today. Nephrology consulted. HTN- continue carvedilol, hydralazine and amlodipine    DM- SSI, humalog 10 U with meals and lantus    Anemia of CKD- Hgb 8.5 today, baseline around 7.3-7.8 g/dL. Continue to monitor H&H.        Diet: No diet orders on file  Code Status: Prior  Surrogate decision maker confirmed with patient:   Extended Emergency Contact Information  Primary Emergency Contact: Azar Perfect [address] Phone: [phone]  Relation: Other  Secondary Emergency Contact: Jorge Mejía [address] Phone: [phone]  Relation: Brother/Sister    DVT Prophylaxis: []Lovenox [x]Heparin []PCD [] [address] []Encouraged ambulation  Disposition: []Med/Surg [x] Intermediate [] ICU/CCU  Admit status: [] Observation [x] Inpatient     +++++++++++++++++++++++++++++++++++++++++++++++++  [address], [place]  +++++++++++++++++++++++++++++++++++++++++++++++++  NOTE: This report was transcribed using voice recognition software. Every effort was made to ensure accuracy; however, inadvertent computerized transcription errors may be present.

## 2021-05-19 NOTE — ED NOTES
FIRST PROVIDER CONTACT ASSESSMENT NOTE      Department of Emergency Medicine   Admit Date: No admission date for patient encounter. Chief Complaint: No chief complaint on file.       History of Present Illness:    Bernard Sahni is a 38 y.o. female who presents to the ED for abscess to rt inner thigh.         -----------------END OF FIRST PROVIDER CONTACT ASSESSMENT NOTE--------------  Electronically signed by Sapna Tang PA-C   DD: 5/19/21               Sapna Tang PA-C  05/19/21 1221

## 2021-05-20 LAB
ANION GAP SERPL CALCULATED.3IONS-SCNC: 17 MMOL/L (ref 7–16)
BASOPHILS ABSOLUTE: 0.02 E9/L (ref 0–0.2)
BASOPHILS RELATIVE PERCENT: 0.3 % (ref 0–2)
BUN BLDV-MCNC: 32 MG/DL (ref 6–20)
CALCIUM SERPL-MCNC: 8.7 MG/DL (ref 8.6–10.2)
CHLORIDE BLD-SCNC: 94 MMOL/L (ref 98–107)
CO2: 27 MMOL/L (ref 22–29)
CREAT SERPL-MCNC: 9.2 MG/DL (ref 0.5–1)
EKG ATRIAL RATE: 90 BPM
EKG P AXIS: 54 DEGREES
EKG P-R INTERVAL: 118 MS
EKG Q-T INTERVAL: 366 MS
EKG QRS DURATION: 66 MS
EKG QTC CALCULATION (BAZETT): 447 MS
EKG R AXIS: 16 DEGREES
EKG T AXIS: 90 DEGREES
EKG VENTRICULAR RATE: 90 BPM
EOSINOPHILS ABSOLUTE: 0.27 E9/L (ref 0.05–0.5)
EOSINOPHILS RELATIVE PERCENT: 4 % (ref 0–6)
GFR AFRICAN AMERICAN: 6
GFR NON-AFRICAN AMERICAN: 6 ML/MIN/1.73
GLUCOSE BLD-MCNC: 110 MG/DL (ref 74–99)
HCT VFR BLD CALC: 24.3 % (ref 34–48)
HEMOGLOBIN: 8.3 G/DL (ref 11.5–15.5)
IMMATURE GRANULOCYTES #: 0.03 E9/L
IMMATURE GRANULOCYTES %: 0.4 % (ref 0–5)
LYMPHOCYTES ABSOLUTE: 1.05 E9/L (ref 1.5–4)
LYMPHOCYTES RELATIVE PERCENT: 15.7 % (ref 20–42)
MCH RBC QN AUTO: 31.1 PG (ref 26–35)
MCHC RBC AUTO-ENTMCNC: 34.2 % (ref 32–34.5)
MCV RBC AUTO: 91 FL (ref 80–99.9)
METER GLUCOSE: 207 MG/DL (ref 74–99)
METER GLUCOSE: 321 MG/DL (ref 74–99)
METER GLUCOSE: 72 MG/DL (ref 74–99)
METER GLUCOSE: 88 MG/DL (ref 74–99)
METER GLUCOSE: 98 MG/DL (ref 74–99)
MONOCYTES ABSOLUTE: 0.43 E9/L (ref 0.1–0.95)
MONOCYTES RELATIVE PERCENT: 6.4 % (ref 2–12)
NEUTROPHILS ABSOLUTE: 4.9 E9/L (ref 1.8–7.3)
NEUTROPHILS RELATIVE PERCENT: 73.2 % (ref 43–80)
PDW BLD-RTO: 16.6 FL (ref 11.5–15)
PLATELET # BLD: 185 E9/L (ref 130–450)
PMV BLD AUTO: 10.4 FL (ref 7–12)
POTASSIUM REFLEX MAGNESIUM: 3.9 MMOL/L (ref 3.5–5)
RBC # BLD: 2.67 E12/L (ref 3.5–5.5)
SODIUM BLD-SCNC: 138 MMOL/L (ref 132–146)
VANCOMYCIN RANDOM: 10.5 MCG/ML (ref 5–40)
WBC # BLD: 6.7 E9/L (ref 4.5–11.5)

## 2021-05-20 PROCEDURE — 82962 GLUCOSE BLOOD TEST: CPT

## 2021-05-20 PROCEDURE — 6360000002 HC RX W HCPCS: Performed by: NURSE PRACTITIONER

## 2021-05-20 PROCEDURE — 80202 ASSAY OF VANCOMYCIN: CPT

## 2021-05-20 PROCEDURE — 80048 BASIC METABOLIC PNL TOTAL CA: CPT

## 2021-05-20 PROCEDURE — 6370000000 HC RX 637 (ALT 250 FOR IP): Performed by: NURSE PRACTITIONER

## 2021-05-20 PROCEDURE — 85025 COMPLETE CBC W/AUTO DIFF WBC: CPT

## 2021-05-20 PROCEDURE — 93010 ELECTROCARDIOGRAM REPORT: CPT | Performed by: INTERNAL MEDICINE

## 2021-05-20 PROCEDURE — 5A1D70Z PERFORMANCE OF URINARY FILTRATION, INTERMITTENT, LESS THAN 6 HOURS PER DAY: ICD-10-PCS | Performed by: INTERNAL MEDICINE

## 2021-05-20 PROCEDURE — 90935 HEMODIALYSIS ONE EVALUATION: CPT | Performed by: INTERNAL MEDICINE

## 2021-05-20 PROCEDURE — 2580000003 HC RX 258: Performed by: NURSE PRACTITIONER

## 2021-05-20 PROCEDURE — 36415 COLL VENOUS BLD VENIPUNCTURE: CPT

## 2021-05-20 PROCEDURE — 99232 SBSQ HOSP IP/OBS MODERATE 35: CPT | Performed by: SURGERY

## 2021-05-20 PROCEDURE — 1200000000 HC SEMI PRIVATE

## 2021-05-20 RX ORDER — OXYCODONE HYDROCHLORIDE AND ACETAMINOPHEN 5; 325 MG/1; MG/1
1 TABLET ORAL EVERY 4 HOURS PRN
Status: DISCONTINUED | OUTPATIENT
Start: 2021-05-20 | End: 2021-05-22 | Stop reason: HOSPADM

## 2021-05-20 RX ADMIN — INSULIN LISPRO 10 UNITS: 100 INJECTION, SOLUTION INTRAVENOUS; SUBCUTANEOUS at 06:41

## 2021-05-20 RX ADMIN — CARVEDILOL 50 MG: 25 TABLET, FILM COATED ORAL at 08:47

## 2021-05-20 RX ADMIN — PIPERACILLIN AND TAZOBACTAM 3375 MG: 3; .375 INJECTION, POWDER, LYOPHILIZED, FOR SOLUTION INTRAVENOUS at 18:06

## 2021-05-20 RX ADMIN — HYDRALAZINE HYDROCHLORIDE 50 MG: 25 TABLET, FILM COATED ORAL at 18:07

## 2021-05-20 RX ADMIN — CARVEDILOL 25 MG: 25 TABLET, FILM COATED ORAL at 21:25

## 2021-05-20 RX ADMIN — HYDRALAZINE HYDROCHLORIDE 50 MG: 25 TABLET, FILM COATED ORAL at 06:40

## 2021-05-20 RX ADMIN — Medication 10 ML: at 08:53

## 2021-05-20 RX ADMIN — FAMOTIDINE 10 MG: 20 TABLET ORAL at 08:46

## 2021-05-20 RX ADMIN — PIPERACILLIN AND TAZOBACTAM 3375 MG: 3; .375 INJECTION, POWDER, LYOPHILIZED, FOR SOLUTION INTRAVENOUS at 02:54

## 2021-05-20 RX ADMIN — Medication 2000 UNITS: at 08:46

## 2021-05-20 RX ADMIN — ACETAMINOPHEN 650 MG: 325 TABLET ORAL at 08:55

## 2021-05-20 RX ADMIN — HEPARIN SODIUM 5000 UNITS: 10000 INJECTION INTRAVENOUS; SUBCUTANEOUS at 18:07

## 2021-05-20 RX ADMIN — HEPARIN SODIUM 5000 UNITS: 10000 INJECTION INTRAVENOUS; SUBCUTANEOUS at 08:51

## 2021-05-20 RX ADMIN — Medication 10 ML: at 21:24

## 2021-05-20 RX ADMIN — INSULIN GLARGINE 16 UNITS: 100 INJECTION, SOLUTION SUBCUTANEOUS at 21:17

## 2021-05-20 ASSESSMENT — PAIN SCALES - GENERAL
PAINLEVEL_OUTOF10: 0
PAINLEVEL_OUTOF10: 6

## 2021-05-20 NOTE — PROGRESS NOTES
Hospitalist Progress Note      Synopsis: Patient admitted on 5/19/2021 for right thigh abscess s/p I&D. With hx of ESRD for which nephrology is consulted     Subjective    Clinically improving. Feeling better. Stable overnight. No other overnight issues reported. No CP, SOB, palpitations, blurred vision, HA, lightheadedness, LOC or focal neurological deficits    Exam:  BP (!) 147/61   Pulse 96   Temp 97.5 °F (36.4 °C)   Resp 18   Ht 4' 11\" (1.499 m)   Wt 176 lb 9.4 oz (80.1 kg)   SpO2 99%   BMI 35.67 kg/m²     General appearance: No distress, appears stated age and cooperative. HEENT: Normal cephalic, atraumatic without obvious deformity. Pupils equal, round, and reactive to light. Facial edema noted. Neck: Supple, with full range of motion. No jugular venous distention. Trachea midline. Respiratory:  Clear to auscultation bilaterally. No apparent distress. Cardiovascular:  Regular rate and rhythm. S1, S2 without murmurs, rubs, or gallops. PV: Brisk capillary refill. +2 pedal and radial pulses bilaterally. + edema BLE; BISHNU AVF with + bruit, thrill  Abdomen: Soft, non-tender, non-distended. +BS  Musculoskeletal: No obvious deformities or erythematous or edematous joints. Skin: Normal skin color. Erythema, gauze overlying R groin   Neurologic:  Neurovascularly intact without any focal sensory/motor deficits.   Psychiatric: Alert and oriented, thought content appropriate, normal insight    Medications:  Reviewed    Infusion Medications    sodium chloride      dextrose       Scheduled Medications    [START ON 5/21/2021] epoetin nadya-epbx  4,000 Units Intravenous Once per day on Mon Wed Fri    sodium chloride flush  5-40 mL Intravenous 2 times per day    heparin (porcine)  5,000 Units Subcutaneous Q8H    piperacillin-tazobactam  3,375 mg Intravenous Q12H    amLODIPine  5 mg Oral Daily    carvedilol  50 mg Oral QAM    carvedilol  25 mg Oral Nightly    famotidine  10 mg Oral QAM    hydrALAZINE  50 mg Oral 3 times per day    vitamin D  2,000 Units Oral Daily    insulin lispro  10 Units Subcutaneous TID AC    insulin glargine  16 Units Subcutaneous Nightly    insulin lispro  0-12 Units Subcutaneous TID WC    insulin lispro  0-6 Units Subcutaneous Nightly    sodium chloride   Intravenous Q12H    HYDROmorphone  0.5 mg Intravenous Once    vancomycin (VANCOCIN) intermittent dosing (placeholder)   Other RX Placeholder     PRN Meds: oxyCODONE-acetaminophen, sodium chloride flush, sodium chloride, promethazine **OR** ondansetron, polyethylene glycol, acetaminophen **OR** acetaminophen, albuterol, glucose, dextrose, glucagon (rDNA), dextrose    I/O    Intake/Output Summary (Last 24 hours) at 5/20/2021 1639  Last data filed at 5/19/2021 2300  Gross per 24 hour   Intake 150 ml   Output --   Net 150 ml       Labs:   Recent Labs     05/19/21  1326 05/20/21  0802   WBC 8.3 6.7   HGB 8.5* 8.3*   HCT 24.7* 24.3*    185       Recent Labs     05/19/21  1326 05/20/21  0802    138   K 3.9 3.9   CL 93* 94*   CO2 30* 27   BUN 29* 32*   CREATININE 8.0* 9.2*   CALCIUM 9.1 8.7       Recent Labs     05/19/21  1326   PROT 7.2   ALKPHOS 122*   ALT 32   AST 34*   BILITOT 0.4       No results for input(s): INR in the last 72 hours. No results for input(s): Ethelene Soulier in the last 72 hours. Chronic labs:  Lab Results   Component Value Date    CHOL 155 10/08/2013    TRIG 68 10/08/2013    HDL 40 01/29/2021    LDLCALC 41 01/29/2021    TSH 3.850 01/27/2021    INR 1.1 11/26/2018    LABA1C 9.1 (H) 01/27/2021       Radiology:  Imaging studies reviewed today. ASSESSMENT:    Active Problems:    Groin abscess  Resolved Problems:    * No resolved hospital problems.  *       PLAN:  Continue antibiotics, follow cultures  Appreciate ID input  Strict I/O  Nephrology consulted for dialysis needs  Glycemic control       Diet: DIET RENAL; Carb Control: 4 carb choices (60 gms)/meal  Code Status: Full Code  PT/OT Eval Status:   ordered  DVT Prophylaxis:   heparin  Recommended disposition at discharge:  pending medical progression     +++++++++++++++++++++++++++++++++++++++++++++++++  Abbey Burgess MD   [facility].  +++++++++++++++++++++++++++++++++++++++++++++++++  NOTE: This report was transcribed using voice recognition software. Every effort was made to ensure accuracy; however, inadvertent computerized transcription errors may be present.

## 2021-05-20 NOTE — FLOWSHEET NOTE
05/20/21 1624   Vital Signs   BP (!) 165/69   Temp 97.3 °F (36.3 °C)   Pulse 96   Resp 18   Weight 173 lb 4.5 oz (78.6 kg)   Weight Method Bed scale   Percent Weight Change -1.87   Pain Assessment   Pain Assessment 0-10   Pain Level 0   Post-Hemodialysis Assessment   Post-Treatment Procedures Blood returned; Access bleeding time < 10 minutes   Machine Disinfection Process Acid/Vinegar Clean;Heat Disinfect; Exterior Machine Disinfection   Rinseback Volume (ml) 300 ml   Total Liters Processed (l/min) 100.4 l/min   Dialyzer Clearance Lightly streaked   Duration of Treatment (minutes) 240 minutes   Heparin amount administered during treatment (units) 0 units   Hemodialysis Intake (ml) 300 ml   Hemodialysis Output (ml) 1800 ml   NET Removed (ml) 1500 ml   Tolerated Treatment Good   Patient Response to Treatment tolerated well, profile B, refill noted, 1500cc luid removal   Bilateral Breath Sounds Diminished   Edema None   Physician Notified?  No

## 2021-05-20 NOTE — CONSULTS
Department of Internal Medicine  Infectious Diseases   Consult Note      Reason for Consult:  Right groin abscess     Requesting Physician:  Dr Bernardino Simon:      This is a 38 yrs old female with hx of ESRD on HD ( left arm AV fistula ) , DM, presented to the ER with right groin swelling, pain and redness for 5 days-. She denied fever and chills. WBC was 8.3 K , CT abdomen and pelvis showed 6.1 x 1.8 cm mildly rim-enhancing subcutaneous fluid collection . I & D was done at bedside . Started on Vancomycin , clindamycin and zosyn . Gram stain - GNR     Past Medical History:      Past Medical History:   Diagnosis Date    [facility]) [facility] 2016    AVF (arteriovenous fistula) ([facility] 2018    let arm    Chronic kidney disease     Dialysis AV fistula malfunction, initial encounter ([facility] [facility] 2019    DM type 1 (diabetes mellitus, type 1) ([facility] [facility]     Encounter regarding vascular access for dialysis for ESRD ([facility] [facility] 2018    ESRD (end stage renal disease) ([facility]     Hemodialysis patient ([facility] [address]     amrita dawson  / graft in left arm    Hypertension     Tobacco abuse 2016         Past Surgical History:    Past Surgical History:   Procedure Laterality Date     SECTION  2013    CYST INCISION AND DRAINAGE  2011    perirectal abscess. [facility].  Dr. Alma Sainz Left 2019    LEFT ARM FISTULAGRAM, BALLOON ANGIOPLASTY performed by Dav Avalos MD at [address]      FRACTURE SURGERY      fracture right ulnar, left tibia, and pelvis    OTHER SURGICAL HISTORY      open reduction internal fixation left radial shaft    OTHER SURGICAL HISTORY  2016    pericardial window    OTHER SURGICAL HISTORY  2016     r tesio insertion  / remove 2018    OTHER SURGICAL HISTORY Left 2017    insertion a-v fistula left arm    MN ANASTOMOSIS,AV,ANY SITE Left 7/17/2018    REVISION AV FISTULA - LEFT ARM performed by Tobin Valentin MD at [facility] NON-TUNNEL CV CATH N/A 5/10/2018    TESIO CATHETER INSERTION performed by Jacqui Gongora MD at [address] PRERETINAL MEMBRANE Left 8/28/2018    PARS PLANA VITRECTOMY 25 GAUGE, VITREOUS HEMORRHAGE REMOVAL, ENDO LASER, AIR/FLUID  EXCHANGE LEFT EYE performed by Chel García MD at [address] N/A 1/29/2021    PERINEAL ABCESS INCISION AND DRAINAGE (LITHOTOMY) performed by Hugh Wilson MD at [address]       Current Medications:      Current Facility-Administered Medications   Medication Dose Route Frequency Provider Last Rate Last Admin    oxyCODONE-acetaminophen (PERCOCET) 5-325 MG per tablet 1 tablet  1 tablet Oral Q4H PRN Abbey Olivares MD        [START ON 5/21/2021] epoetin nadya-epbx (RETACRIT) injection 4,000 Units  4,000 Units Intravenous Once per day on Mon Wed Fri Kishan Berger, APRN - CNS        sodium chloride flush 0.9 % injection 5-40 mL  5-40 mL Intravenous 2 times per day David Canales, APRN - CNP   10 mL at 05/20/21 0853    sodium chloride flush 0.9 % injection 5-40 mL  5-40 mL Intravenous PRN David Canales, APRN - CNP        0.9 % sodium chloride infusion  25 mL Intravenous PRN David Canales, APRN - CNP        promethazine (PHENERGAN) tablet 12.5 mg  12.5 mg Oral Q6H PRN David Lermaa, APRN - CNP        Or    ondansetron (ZOFRAN) injection 4 mg  4 mg Intravenous Q6H PRN David Lermaa, APRN - CNP        polyethylene glycol (GLYCOLAX) packet 17 g  17 g Oral Daily PRN David Lermaa, APRN - CNP        acetaminophen (TYLENOL) tablet 650 mg  650 mg Oral Q6H PRN David Lermaa, APRN - CNP   650 mg at 05/20/21 0855    Or    acetaminophen (TYLENOL) suppository 650 mg  650 mg Rectal Q6H PRN David Lermaa, APRN - CNP        heparin (porcine) injection 5,000 Units  5,000 Units Subcutaneous Q8H David Lermaa, APRN - CNP   5,000 Units at 05/20/21 0851    piperacillin-tazobactam (ZOSYN) 3,375 mg in dextrose 5 % 100 mL IVPB extended infusion (mini-bag)  3,375 mg Intravenous Q12H KEMI Wade CNP   Stopped at 05/20/21 0640    albuterol (PROVENTIL) nebulizer solution 2.5 mg  2.5 mg Nebulization Q4H PRN KEMI Wade CNP        amLODIPine (NORVASC) tablet 5 mg  5 mg Oral Daily KEMI Wade CNP        carvedilol (COREG) tablet 50 mg  50 mg Oral QAM KEMI Wade - CNP   50 mg at 05/20/21 0847    carvedilol (COREG) tablet 25 mg  25 mg Oral Nightly KEMI Wade CNP   25 mg at 05/19/21 2056    famotidine (PEPCID) tablet 10 mg  10 mg Oral QAM KEMI Wade - CNP   10 mg at 05/20/21 0846    hydrALAZINE (APRESOLINE) tablet 50 mg  50 mg Oral 3 times per day KEMI Wade - CNP   50 mg at 05/20/21 0640    vitamin D (CHOLECALCIFEROL) tablet 2,000 Units  2,000 Units Oral Daily KEMI Wade CNP   2,000 Units at 05/20/21 0846    insulin lispro (HUMALOG) injection vial 10 Units  10 Units Subcutaneous TID AC KEMI Wade CNP   10 Units at 05/20/21 0641    insulin glargine (LANTUS) injection vial 16 Units  16 Units Subcutaneous Nightly KEMI Wade CNP   16 Units at 05/19/21 2104    insulin lispro (HUMALOG) injection vial 0-12 Units  0-12 Units Subcutaneous TID WC KEMI Wade CNP        insulin lispro (HUMALOG) injection vial 0-6 Units  0-6 Units Subcutaneous Nightly KEMI Wade CNP        glucose (GLUTOSE) 40 % oral gel 15 g  15 g Oral PRN KEMI Wade CNP        dextrose 50 % IV solution  12.5 g Intravenous PRN KEMI Wade CNP        glucagon (rDNA) injection 1 mg  1 mg Intramuscular PRN KEMI Wade CNP        dextrose 5 % solution  100 mL/hr Intravenous PRN KEMI Wade CNP        Post Zosyn Flush (0.9 % sodium chloride infusion)   Intravenous Q12H KEMI Hansen - CNP        HYDROmorphone (DILAUDID) injection 0.5 mg  0.5 mg Intravenous Once Ambreen Fox MD        vancomycin Halle Foreman) intermittent dosing (placeholder)   Other RX Lissett Watkins MD           Allergies:  Patient has no known allergies. Social History:    Social History     Socioeconomic History    Marital status:      Spouse name: Not on file    Number of children: Not on file    Years of education: Not on file    Highest education level: Not on file   Occupational History    Not on file   Tobacco Use    Smoking status: Light Tobacco Smoker     Years: 15.00     Types: Cigarettes    Smokeless tobacco: Never Used    Tobacco comment: PPMonth   Vaping Use    Vaping Use: Never used   Substance and Sexual Activity    Alcohol use: Not Currently     Alcohol/week: 0.0 standard drinks    Drug use: No    Sexual activity: Not on file   Other Topics Concern    Not on file   Social History Narrative    ** Merged History Encounter **          Social Determinants of Health     Financial Resource Strain:     Difficulty of Paying Living Expenses:    Food Insecurity:     Worried About Running Out of Food in the Last Year:     Ran Out of Food in the Last Year:    Transportation Needs:     Lack of Transportation (Medical):      Lack of Transportation (Non-Medical):    Physical Activity:     Days of Exercise per Week:     Minutes of Exercise per Session:    Stress:     Feeling of Stress :    Social Connections:     Frequency of Communication with Friends and Family:     Frequency of Social Gatherings with Friends and Family:     Attends Holiness Services:     Active Member of Clubs or Organizations:     Attends Club or Organization Meetings:     Marital Status:    Intimate Partner Violence:     Fear of Current or Ex-Partner:     Emotionally Abused:     Physically Abused:     Sexually Abused:      Family History:     Family History   Problem 05/20/2021    MCHC 34.2 05/20/2021    RDW 16.6 05/20/2021    SEGSPCT 53 10/08/2013    LYMPHOPCT 15.7 05/20/2021    MONOPCT 6.4 05/20/2021    BASOPCT 0.3 05/20/2021    MONOSABS 0.43 05/20/2021    LYMPHSABS 1.05 05/20/2021    EOSABS 0.27 05/20/2021    BASOSABS 0.02 05/20/2021       CMP     Lab Results   Component Value Date     05/20/2021    K 3.9 05/20/2021    CL 94 05/20/2021    CO2 27 05/20/2021    BUN 32 05/20/2021    CREATININE 9.2 05/20/2021    GFRAA 6 05/20/2021    LABGLOM 6 05/20/2021    GLUCOSE 110 05/20/2021    GLUCOSE 279 07/27/2011    PROT 7.2 05/19/2021    LABALBU 3.5 05/19/2021    LABALBU 3.9 01/10/2011    CALCIUM 8.7 05/20/2021    BILITOT 0.4 05/19/2021    ALKPHOS 122 05/19/2021    AST 34 05/19/2021    ALT 32 05/19/2021         Hepatic Function Panel:    Lab Results   Component Value Date    ALKPHOS 122 05/19/2021    ALT 32 05/19/2021    AST 34 05/19/2021    PROT 7.2 05/19/2021    BILITOT 0.4 05/19/2021    LABALBU 3.5 05/19/2021    LABALBU 3.9 01/10/2011       PT/INR:    Lab Results   Component Value Date    PROTIME 12.2 11/26/2018    INR 1.1 11/26/2018       TSH:    Lab Results   Component Value Date    TSH 3.850 01/27/2021       U/A:    Lab Results   Component Value Date    COLORU Yellow 11/23/2017    PHUR 8.5 11/23/2017    LABCAST FEW  09/29/2013    LABCAST FEW  09/29/2013    WBCUA 2-5 11/23/2017    WBCUA NONE 11/21/2010    RBCUA 5-10 11/23/2017    RBCUA 0-1 09/29/2013    TRICHOMONAS RARE 08/16/2016    BACTERIA RARE 11/23/2017    CLARITYU Clear 11/23/2017    SPECGRAV 1.015 11/23/2017    LEUKOCYTESUR Negative 11/23/2017    UROBILINOGEN 0.2 11/23/2017    BILIRUBINUR Negative 11/23/2017    BILIRUBINUR NEGATIVE 11/21/2010    BLOODU MODERATE 11/23/2017    GLUCOSEU 500 11/23/2017    GLUCOSEU >=1000 11/21/2010       ABG:  No results found for: ZJS4GVI, BEART, V4YPWWSP, PHART, THGBART, SHL9HGG, PO2ART, NUV0GFL    MICROBIOLOGY:        Wound Culture -     Gram stain performed from swab, interpret results with   caution. Swab specimens of sterile fluids are inferior to   aspirate specimens for organism recovery. Moderate Polymorphonuclear leukocytes   Epithelial cells not seen   Abundant Gram negative rods     Narrative:           Radiology :    CT scan abdomen and pelvis -  Impression:        1. 6.1 x 1.8 cm mildly rim-enhancing subcutaneous fluid collection in the   proximal medial thigh may represent an abscess.  No extension to the labia,   perineum or the pelvis. 2. Third-spacing of fluid, including interstitial and alveolar edema, small   bilateral pleural effusions, small to moderate volume ascites and moderate   anasarca. 3. Inhomogeneous enhancement of the liver is nonspecific and may signify   congestive hepatopathy. 4. Cholelithiasis. 5. Bilaterally atrophic kidneys. 6. Mildly increased sclerosis in the bones may represent renal osteodystrophy.            IMPRESSION:     1. Right thigh abscess s/p I & D  2. ESRD on HD     RECOMMENDATIONS:    1. Vancomycin random level   2. Zosyn 3.375 grams IV q 12 hrs   3.  Await cx     Thank you Dr Marybeth Baker for the consult

## 2021-05-20 NOTE — PLAN OF CARE
Problem: Pain:  Goal: Pain level will decrease  Description: Pain level will decrease  5/20/2021 1747 by Sandra Wilcox RN  Outcome: Met This Shift  5/20/2021 0441 by Booker Rodríguez RN  Outcome: Met This Shift  Goal: Control of acute pain  Description: Control of acute pain  5/20/2021 1747 by Sandra Wilcox RN  Outcome: Met This Shift  5/20/2021 0441 by Booker Rodríguez RN  Outcome: Met This Shift  Goal: Control of chronic pain  Description: Control of chronic pain  5/20/2021 1747 by Sandra Wilcox RN  Outcome: Met This Shift  5/20/2021 0441 by Booker Rodríguez RN  Outcome: Met This Shift     Problem: Falls - Risk of:  Goal: Will remain free from falls  Description: Will remain free from falls  5/20/2021 1747 by Sandra Wilcox RN  Outcome: Met This Shift  5/20/2021 0441 by Booker Rodríguez RN  Outcome: Met This Shift  Goal: Absence of physical injury  Description: Absence of physical injury  5/20/2021 1747 by Sandra Wilcox RN  Outcome: Met This Shift  5/20/2021 0441 by Booker Rodríguez RN  Outcome: Met This Shift

## 2021-05-20 NOTE — PROGRESS NOTES
Pharmacy Consultation Note  (Antibiotic Dosing and Monitoring)    Initial consult date: 21  Consulting physician:KEYA Maria  Drug(s): Vancomycin  Indication: SSTI    Ht Readings from Last 1 Encounters:   21 4' 11\" (1.499 m)       Age/Gender IBW DW  Allergy Information   38 y.o.  female  62.1 kg  Patient has no known allergies. Date  WBC BUN/sCr UOP (mL/kg/hr) Drug/Dose Time   Given Level(s)   (Time) Comments     (#1) 8.3 29/8.0 --- Vancomycin 1000 mg IV x1 1513       (#2) 6.7 ESRD HD X 4 hr  1222 -1624 Vancomycin 750 mg IV x 1 <2100> [email or web address]      (#3)      [email or web address]      (#4)            (#5)            (#6)            Other Antibiotics/Antifungals/Antivirals Start Date Stop Date Comments   Zosyn      Clindamycin                      Estimated CrCL:      Intake/Output Summary (Last 24 hours) at 2021 0848  Last data filed at 2021 2300  Gross per 24 hour   Intake 150 ml   Output --   Net 150 ml       Temp max: Temp (24hrs), Av.3 °F (36.3 °C), Min:96.9 °F (36.1 °C), Max:97.8 °F (36.6 °C)      Cultures:    Culture Date Result                              Assessment:  · Consulted by KEYA Andres to dose/monitor vancomycin  · Goal trough level:  15-20 mcg/mL  · 35 YOF with right groin abscess started on Vancomycin/Zosyn  · ESRD on IHD   ·  4 hr HD   Plan:  · Vancomycin 750 mg x1 after HD today, pre random level for  AM  · Future doses to be guided by levels and dialysis schedule,   · Pharmacist will follow and monitor/adjust dosing as necessary    Thank you for this consult, please call with questions.     Ashanti GuillenD 2021 8:48 AM

## 2021-05-20 NOTE — PROGRESS NOTES
Report given to [facility] RN. Patient is currently in dialysis, will return to this floor and then will be transported to 8 WE so monitor can be obtained.

## 2021-05-20 NOTE — CARE COORDINATION
Spoke with Pt about transition Plan of Care Pt lives in a first floor apartment with no steps. Pt uses no DME. PCP: Dr. Kalia Hernandez. Pharmacy: [facility]. Pt drove herself self to hospital.  Dialysis at [facility] MWF 11am.   Hx with [facility] back in January and will use again Referral made to [facility]. Latrel will add to schedule for Sunday. Awaiting for Therapy recommendations. Discharge plan is to return home with [facility]. JOEY/TELMA to follow for discharge needs.    Della Deluca, L.S.W.  [phone]

## 2021-05-20 NOTE — PROGRESS NOTES
Attending Attestation   Patient seen and examined, agree with resident note, for remaining HP/Consult details please see resident HP/Consult note. General Surgery Progress Note:    CC: abscess    S: feels much better since abscess opened.          Objective:  @/68   Pulse 93   Temp 97.5 °F (36.4 °C)   Resp 18   Ht 4' 11\" (1.499 m)   Wt 176 lb 9.4 oz (80.1 kg)   SpO2 99%   BMI 35.67 kg/m² @    Physical -     Gen: NAD  HEENT: periorbital edema conj pink,   Resp: Breathing Comfortably, no resp distress  CV: Reg Rate no extra heart sound s  Abd: obese soft  EXT nvi moderate edema b/l LE, R thigh indurated packing in place,     Assessment/Plan: Thigh abscess     S/p I+D thigh abscess,  Doing well no issues,   Will place wound care orders,  Surgery will s/o please call if any problems       Shira Yeung MD FACS     2:51 PM

## 2021-05-20 NOTE — CONSULTS
Reason for Consult:  ESRD on HD      History of Present Ilness: The patient is well known to our practice as we follow her ESRD on HD, therefore a consult is deferred. She presented to the ED yesterday with c/o right groin swelling and drainage. She was hospitalized in . with a perineal abscess and was treated with Vanc, Zosyn and Augmentin. Initial labs were significant for CO2 30, Hgb 8.5. VS: /58, P-98, R-18. General surgery has been consulted and antibiotics ordered. Upon assessment the patient is resting in bed. Denies sob, right groin is quite painful. Past Medical History:      Past Medical History:   Diagnosis Date    JOLLY (acute kidney injury) ([address] 2016    AVF [facility]arteriovenous fistula) [facility] [address] 2018    let arm    Chronic kidney disease     Dialysis AV fistula malfunction, initial encounter ([facility] [address] 2019    DM type 1 (diabetes mellitus, type 1) ([address]     Encounter regarding vascular access for dialysis for ESRD ([facility] [address] [facility] 2018    ESRD (end stage renal disease) ([facility] [address]     Hemodialysis patient ([facility] [address]     fersinus eunice  / graft in left arm    Hypertension     Tobacco abuse 2016       Past Surgical History:     Past Surgical History:   Procedure Laterality Date     SECTION  2013    CYST INCISION AND DRAINAGE  2011    perirectal abscess. [facility].  Dr. Jodi Gongora Left 2019    LEFT ARM FISTULAGRAM, BALLOON ANGIOPLASTY performed by Vito Aguilar MD at [address]      FRACTURE SURGERY      fracture right ulnar, left tibia, and pelvis    OTHER SURGICAL HISTORY      open reduction internal fixation left radial shaft    OTHER SURGICAL HISTORY  2016    pericardial window    OTHER SURGICAL HISTORY  2016     r tesio insertion  / remove 2018    OTHER SURGICAL HISTORY Left 2017    insertion a-v fistula left arm    HI ANASTOMOSIS,AV,ANY SITE Left 7/17/2018    REVISION AV FISTULA - LEFT ARM performed by Victor Manuel Barriga MD at [facility] NON-TUNNEL CV CATH N/A 5/10/2018    TESIO CATHETER INSERTION performed by Jackie Cunha MD at [address] PRERETINAL MEMBRANE Left 8/28/2018    PARS PLANA VITRECTOMY 25 GAUGE, VITREOUS HEMORRHAGE REMOVAL, ENDO LASER, AIR/FLUID  EXCHANGE LEFT EYE performed by Louise Garcia MD at [address] N/A 1/29/2021    PERINEAL ABCESS INCISION AND DRAINAGE (LITHOTOMY) performed by Greg Bhagat MD at [address] [address] History:      Social History     Socioeconomic History    Marital status:      Spouse name: Not on file    Number of children: Not on file    Years of education: Not on file    Highest education level: Not on file   Occupational History    Not on file   Tobacco Use    Smoking status: Light Tobacco Smoker     Years: 15.00     Types: Cigarettes    Smokeless tobacco: Never Used    Tobacco comment: PPMonth   Vaping Use    Vaping Use: Never used   Substance and Sexual Activity    Alcohol use: Not Currently     Alcohol/week: 0.0 standard drinks    Drug use: No    Sexual activity: Not on file   Other Topics Concern    Not on file   Social History Narrative    ** Merged History Encounter **          Social Determinants of Health     Financial Resource Strain:     Difficulty of Paying Living Expenses:    Food Insecurity:     Worried About Running Out of Food in the Last Year:     Ran Out of Food in the Last Year:    Transportation Needs:     Lack of Transportation (Medical):      Lack of Transportation (Non-Medical):    Physical Activity:     Days of Exercise per Week:     Minutes of Exercise per Session:    Stress:     Feeling of Stress :    Social Connections:     Frequency of Communication with Friends and Family:     Frequency of Social Gatherings with Friends and Family:     Attends Rastafarian Services:     Active CNP, 25 mg at 05/19/21 2056    famotidine (PEPCID) tablet 10 mg, 10 mg, Oral, QAM, Bren Lezama APRN - CNP    hydrALAZINE (APRESOLINE) tablet 50 mg, 50 mg, Oral, 3 times per day, Raffi Ernandez, APRN - CNP, 50 mg at 05/20/21 0640    vitamin D (CHOLECALCIFEROL) tablet 2,000 Units, 2,000 Units, Oral, Daily, KEMI Calles - CNP    insulin lispro (HUMALOG) injection vial 10 Units, 10 Units, Subcutaneous, TID AC, Bren Lezama APRN - CNP, 10 Units at 05/20/21 0641    insulin glargine (LANTUS) injection vial 16 Units, 16 Units, Subcutaneous, Nightly, Bren Lezama APRN - CNP, 16 Units at 05/19/21 2104    insulin lispro (HUMALOG) injection vial 0-12 Units, 0-12 Units, Subcutaneous, TID WC, Caro Lezama APRN - CNP    insulin lispro (HUMALOG) injection vial 0-6 Units, 0-6 Units, Subcutaneous, Nightly, Caro N Lise, APRN - CNP    glucose (GLUTOSE) 40 % oral gel 15 g, 15 g, Oral, PRN, Bren Lezama, APRN - CNP    dextrose 50 % IV solution, 12.5 g, Intravenous, PRN, Bren Lezama, APRN - CNP    glucagon (rDNA) injection 1 mg, 1 mg, Intramuscular, PRN, Bren Lezama APRN - CNP    dextrose 5 % solution, 100 mL/hr, Intravenous, PRN, Bren Lezama, APRN - CNP    Post Zosyn Flush (0.9 % sodium chloride infusion), , Intravenous, Q12H, Bren Lezama APRN - CNP    HYDROmorphone (DILAUDID) injection 0.5 mg, 0.5 mg, Intravenous, Once, Edmund Solis MD    vancomycin [address]) intermittent dosing (placeholder), , Other, RX Placeholder, Nimco Edge MD    Allergies:  Patient has no known allergies. Review of Systems:   Pertinent positives stated above in HPI. All other systems were reviewed and were negative.     Physical exam:   Constitutional:  BP (!) 118/58   Pulse 95   Temp 96.9 °F (36.1 °C) (Temporal)   Resp 15   Ht 4' 11\" (1.499 m)   Wt 137 lb (62.1 kg)   SpO2 96%   BMI 27.67 kg/m²      Gen: alert, awake, nad  Skin: no rash on exposed skin, abscess in Right groin  Heent:  eomi, mmm, facial/periorbital edema  Neck: no jvd noted  Cardiovascular:  S1, S2 without m/r/g  Respiratory: Lungs clear upper, diminished lower  Abdomen: soft, non-tender, + bowel sounds  Ext: neg lower extremity edema, inner right groin/thigh edema -dressing intact,  LUE AVF   Psychiatric: mood and affect appropriate      Data:     CBC with Differential:    Lab Results   Component Value Date    WBC 6.7 05/20/2021    RBC 2.67 05/20/2021    RBC  08/19/2016      RBC           [id number]    transfused   08/21/16  07:02  SCC    RBC  08/19/2016      RBC           [id number]    released     08/23/16  00:54  SCC    HGB 8.3 05/20/2021    HCT 24.3 05/20/2021     05/20/2021    MCV 91.0 05/20/2021    MCH 31.1 05/20/2021    MCHC 34.2 05/20/2021    RDW 16.6 05/20/2021    SEGSPCT 53 10/08/2013    LYMPHOPCT 15.7 05/20/2021    MONOPCT 6.4 05/20/2021    BASOPCT 0.3 05/20/2021    MONOSABS 0.43 05/20/2021    LYMPHSABS 1.05 05/20/2021    EOSABS 0.27 05/20/2021    BASOSABS 0.02 05/20/2021     CMP:    Lab Results   Component Value Date     05/20/2021    K 3.9 05/20/2021    CL 94 05/20/2021    CO2 27 05/20/2021    BUN 32 05/20/2021    CREATININE 9.2 05/20/2021    GFRAA 6 05/20/2021    LABGLOM 6 05/20/2021    GLUCOSE 110 05/20/2021    GLUCOSE 279 07/27/2011    PROT 7.2 05/19/2021    LABALBU 3.5 05/19/2021    LABALBU 3.9 01/10/2011    CALCIUM 8.7 05/20/2021    BILITOT 0.4 05/19/2021    ALKPHOS 122 05/19/2021    AST 34 05/19/2021    ALT 32 05/19/2021     Magnesium:    Lab Results   Component Value Date    MG 2.2 02/01/2021     Phosphorus:    Lab Results   Component Value Date    PHOS 6.6 02/01/2021     HgBA1c:    Lab Results   Component Value Date    LABA1C 9.1 01/27/2021     VITAMIN B12: No components found for: B12  FOLATE:    Lab Results   Component Value Date    FOLATE 19.4 01/27/2021     IRON:    Lab Results   Component Value Date    IRON 50 01/27/2021     Iron Saturation:  No components found for: PERCENTFE  TIBC:    Lab Results   Component Value Date    TIBC 173 01/27/2021     FERRITIN:    Lab Results   Component Value Date    FERRITIN 613 01/29/2021       Assessment/Plan    1. ESRD dependent on IHD  Dialyzes at [facility]  MWF  HD yesterday and this afternoon. Continue MWF dialysis       2. Htn with CKD 5/ESRD  BP at goal <140/90  Abd CT shows 3rd spacing of fluid  Volume removal as tolerated on IHD  Continue home meds: amlodipine 5 mg and carvedilol 25 mg bid, and hydralazine 50 mg Q 8 hrs        3. DM   Primary following     4. Anemia in CKD  Hgb below goal >10  Transfuse < 7.0  Iron 50, iron sat 29, TIBC 173, folate 19.4, Vit B-12 784, Ferritin 613  Start VALENTIN     5. Sec HPTH of renal origin  PO4 5.7        6.  Right groin abscess  Surgery consulted, I&D 5/19  On Zosyn, clindamycin      Thank you for the opportunity to participate in care of [address] [facility], APRN - CNS  Pt seen and examined agree with above  Hd mwf  On abx for groin abscess  Sp I and d  Kate Hale MD

## 2021-05-21 LAB
ANION GAP SERPL CALCULATED.3IONS-SCNC: 14 MMOL/L (ref 7–16)
BUN BLDV-MCNC: 17 MG/DL (ref 6–20)
CALCIUM SERPL-MCNC: 8.6 MG/DL (ref 8.6–10.2)
CHLORIDE BLD-SCNC: 93 MMOL/L (ref 98–107)
CO2: 29 MMOL/L (ref 22–29)
CREAT SERPL-MCNC: 5.9 MG/DL (ref 0.5–1)
GFR AFRICAN AMERICAN: 10
GFR NON-AFRICAN AMERICAN: 10 ML/MIN/1.73
GLUCOSE BLD-MCNC: 138 MG/DL (ref 74–99)
HCT VFR BLD CALC: 24.4 % (ref 34–48)
HEMOGLOBIN: 8 G/DL (ref 11.5–15.5)
MCH RBC QN AUTO: 30.1 PG (ref 26–35)
MCHC RBC AUTO-ENTMCNC: 32.8 % (ref 32–34.5)
MCV RBC AUTO: 91.7 FL (ref 80–99.9)
METER GLUCOSE: 157 MG/DL (ref 74–99)
METER GLUCOSE: 162 MG/DL (ref 74–99)
METER GLUCOSE: 240 MG/DL (ref 74–99)
METER GLUCOSE: 301 MG/DL (ref 74–99)
METER GLUCOSE: 98 MG/DL (ref 74–99)
PDW BLD-RTO: 16.6 FL (ref 11.5–15)
PLATELET # BLD: 201 E9/L (ref 130–450)
PMV BLD AUTO: 10.8 FL (ref 7–12)
POTASSIUM SERPL-SCNC: 4.2 MMOL/L (ref 3.5–5)
RBC # BLD: 2.66 E12/L (ref 3.5–5.5)
SODIUM BLD-SCNC: 136 MMOL/L (ref 132–146)
VANCOMYCIN RANDOM: 6.9 MCG/ML (ref 5–40)
WBC # BLD: 5.4 E9/L (ref 4.5–11.5)

## 2021-05-21 PROCEDURE — 36415 COLL VENOUS BLD VENIPUNCTURE: CPT

## 2021-05-21 PROCEDURE — 1200000000 HC SEMI PRIVATE

## 2021-05-21 PROCEDURE — 2580000003 HC RX 258: Performed by: INTERNAL MEDICINE

## 2021-05-21 PROCEDURE — 85027 COMPLETE CBC AUTOMATED: CPT

## 2021-05-21 PROCEDURE — 90935 HEMODIALYSIS ONE EVALUATION: CPT

## 2021-05-21 PROCEDURE — 6360000002 HC RX W HCPCS: Performed by: INTERNAL MEDICINE

## 2021-05-21 PROCEDURE — 6360000002 HC RX W HCPCS: Performed by: CLINICAL NURSE SPECIALIST

## 2021-05-21 PROCEDURE — 6360000002 HC RX W HCPCS: Performed by: NURSE PRACTITIONER

## 2021-05-21 PROCEDURE — 80048 BASIC METABOLIC PNL TOTAL CA: CPT

## 2021-05-21 PROCEDURE — 6370000000 HC RX 637 (ALT 250 FOR IP): Performed by: NURSE PRACTITIONER

## 2021-05-21 PROCEDURE — 82962 GLUCOSE BLOOD TEST: CPT

## 2021-05-21 PROCEDURE — 2580000003 HC RX 258: Performed by: NURSE PRACTITIONER

## 2021-05-21 PROCEDURE — 80202 ASSAY OF VANCOMYCIN: CPT

## 2021-05-21 RX ORDER — CEFEPIME HYDROCHLORIDE 2 G/1
2 INJECTION, POWDER, FOR SOLUTION INTRAVENOUS
Qty: 10000 MG | Refills: 0 | Status: SHIPPED | OUTPATIENT
Start: 2021-05-21 | End: 2021-05-31

## 2021-05-21 RX ADMIN — SODIUM CHLORIDE: 9 INJECTION, SOLUTION INTRAVENOUS at 09:34

## 2021-05-21 RX ADMIN — SODIUM CHLORIDE, PRESERVATIVE FREE 10 ML: 5 INJECTION INTRAVENOUS at 18:32

## 2021-05-21 RX ADMIN — PIPERACILLIN AND TAZOBACTAM 3375 MG: 3; .375 INJECTION, POWDER, LYOPHILIZED, FOR SOLUTION INTRAVENOUS at 04:00

## 2021-05-21 RX ADMIN — INSULIN LISPRO 10 UNITS: 100 INJECTION, SOLUTION INTRAVENOUS; SUBCUTANEOUS at 18:36

## 2021-05-21 RX ADMIN — HEPARIN SODIUM 5000 UNITS: 10000 INJECTION INTRAVENOUS; SUBCUTANEOUS at 18:32

## 2021-05-21 RX ADMIN — HEPARIN SODIUM 5000 UNITS: 10000 INJECTION INTRAVENOUS; SUBCUTANEOUS at 02:00

## 2021-05-21 RX ADMIN — HEPARIN SODIUM 5000 UNITS: 10000 INJECTION INTRAVENOUS; SUBCUTANEOUS at 23:46

## 2021-05-21 RX ADMIN — CARVEDILOL 25 MG: 25 TABLET, FILM COATED ORAL at 20:34

## 2021-05-21 RX ADMIN — SODIUM CHLORIDE: 9 INJECTION, SOLUTION INTRAVENOUS at 21:52

## 2021-05-21 RX ADMIN — INSULIN LISPRO 10 UNITS: 100 INJECTION, SOLUTION INTRAVENOUS; SUBCUTANEOUS at 06:38

## 2021-05-21 RX ADMIN — HEPARIN SODIUM 5000 UNITS: 10000 INJECTION INTRAVENOUS; SUBCUTANEOUS at 08:01

## 2021-05-21 RX ADMIN — HYDRALAZINE HYDROCHLORIDE 50 MG: 25 TABLET, FILM COATED ORAL at 06:48

## 2021-05-21 RX ADMIN — HYDRALAZINE HYDROCHLORIDE 50 MG: 25 TABLET, FILM COATED ORAL at 20:35

## 2021-05-21 RX ADMIN — Medication 2000 UNITS: at 08:01

## 2021-05-21 RX ADMIN — INSULIN LISPRO 2 UNITS: 100 INJECTION, SOLUTION INTRAVENOUS; SUBCUTANEOUS at 20:37

## 2021-05-21 RX ADMIN — EPOETIN ALFA-EPBX 4000 UNITS: 4000 INJECTION, SOLUTION INTRAVENOUS; SUBCUTANEOUS at 10:10

## 2021-05-21 RX ADMIN — FAMOTIDINE 10 MG: 20 TABLET ORAL at 08:01

## 2021-05-21 RX ADMIN — CEFEPIME HYDROCHLORIDE 2000 MG: 2 INJECTION, POWDER, FOR SOLUTION INTRAVENOUS at 18:31

## 2021-05-21 RX ADMIN — VANCOMYCIN HYDROCHLORIDE 1000 MG: 1 INJECTION, POWDER, LYOPHILIZED, FOR SOLUTION INTRAVENOUS at 18:31

## 2021-05-21 RX ADMIN — Medication 10 ML: at 20:41

## 2021-05-21 RX ADMIN — INSULIN LISPRO 2 UNITS: 100 INJECTION, SOLUTION INTRAVENOUS; SUBCUTANEOUS at 18:32

## 2021-05-21 RX ADMIN — CARVEDILOL 50 MG: 25 TABLET, FILM COATED ORAL at 08:02

## 2021-05-21 RX ADMIN — INSULIN LISPRO 8 UNITS: 100 INJECTION, SOLUTION INTRAVENOUS; SUBCUTANEOUS at 08:25

## 2021-05-21 RX ADMIN — SODIUM CHLORIDE, PRESERVATIVE FREE 10 ML: 5 INJECTION INTRAVENOUS at 21:50

## 2021-05-21 RX ADMIN — Medication 10 ML: at 08:01

## 2021-05-21 RX ADMIN — INSULIN GLARGINE 16 UNITS: 100 INJECTION, SOLUTION SUBCUTANEOUS at 20:40

## 2021-05-21 ASSESSMENT — PAIN SCALES - GENERAL
PAINLEVEL_OUTOF10: 0

## 2021-05-21 NOTE — CARE COORDINATION
Social Work Discharge/Planning:    ID is following. Awaiting cultures. Patient is currently on IV Zosyn 3,375mg Q12 and Vancomycin random level. Nephrology is following. SW met with patient to explain role and follow up transition of care discussion. Patient reports she follows with Nephrologist Dr. Ambreen Resendiz. Patient's dialysis days are MWF, chair time 11am at [facility]. Patient reports plan remains to return home with [facility] once medically stable for discharge. Patient drove herself here and reports she will drive herself home. JOEY/TELMA to follow.     MARCELO Schafer  [phone]

## 2021-05-21 NOTE — PROGRESS NOTES
Reason for Consult:  ESRD on HD      History of Present Ilness: The patient is well known to our practice as we follow her ESRD on HD, therefore a consult is deferred. She presented to the ED yesterday with c/o right groin swelling and drainage. She was hospitalized in . with a perineal abscess and was treated with Vanc, Zosyn and Augmentin. Initial labs were significant for CO2 30, Hgb 8.5. VS: /58, P-98, R-18. General surgery has been consulted and antibiotics ordered. Upon assessment the patient is resting in bed. Denies sob, right groin is quite painful.    21; Examined in room. States she is feeling much better today and wants to go home. States she has stopped taking amlodipine she believes it is causing hair loss. Past Medical History:      Past Medical History:   Diagnosis Date    JOLLY (acute kidney injury) ([facility] 2016    AVF (arteriovenous fistula) ([facility] 2018    let arm    Chronic kidney disease     Dialysis AV fistula malfunction, initial encounter ([address] 2019    DM type 1 (diabetes mellitus, type 1) ([address]     Encounter regarding vascular access for dialysis for ESRD ([facility] 2018    ESRD (end stage renal disease) ([address]     Hemodialysis patient ([facility]     amrita dawson  / graft in left arm    Hypertension     Tobacco abuse 2016       Past Surgical History:     Past Surgical History:   Procedure Laterality Date     SECTION  2013    CYST INCISION AND DRAINAGE  2011    perirectal abscess. [facility].  Dr. Meagan Rivera Left 2019    LEFT ARM FISTULAGRAM, BALLOON ANGIOPLASTY performed by Graciela Magallon MD at [address]      FRACTURE SURGERY      fracture right ulnar, left tibia, and pelvis    OTHER SURGICAL HISTORY      open reduction internal fixation left radial shaft    OTHER SURGICAL HISTORY  2016    pericardial window    OTHER SURGICAL HISTORY  08/23/2016     r tesio insertion  / remove 8/16/2018    OTHER SURGICAL HISTORY Left 02/17/2017    insertion a-v fistula left arm    FL ANASTOMOSIS,AV,ANY SITE Left 7/17/2018    REVISION AV FISTULA - LEFT ARM performed by Anneliese Trejo MD at [address] [place] INSERT NON-TUNNEL CV CATH N/A 5/10/2018    TESIO CATHETER INSERTION performed by Albert Emanuel MD at [address] PRERETINAL MEMBRANE Left 8/28/2018    PARS PLANA VITRECTOMY 25 GAUGE, VITREOUS HEMORRHAGE REMOVAL, ENDO LASER, AIR/FLUID  EXCHANGE LEFT EYE performed by Juan Jose Soto MD at [address] N/A 1/29/2021    PERINEAL ABCESS INCISION AND DRAINAGE (LITHOTOMY) performed by Guadalupe Black MD at [address] [place] [address] History:      Social History     Socioeconomic History    Marital status:      Spouse name: Not on file    Number of children: Not on file    Years of education: Not on file    Highest education level: Not on file   Occupational History    Not on file   Tobacco Use    Smoking status: Light Tobacco Smoker     Years: 15.00     Types: Cigarettes    Smokeless tobacco: Never Used    Tobacco comment: PPMonth   Vaping Use    Vaping Use: Never used   Substance and Sexual Activity    Alcohol use: Not Currently     Alcohol/week: 0.0 standard drinks    Drug use: No    Sexual activity: Not on file   Other Topics Concern    Not on file   Social History Narrative    ** Merged History Encounter **          Social Determinants of Health     Financial Resource Strain:     Difficulty of Paying Living Expenses:    Food Insecurity:     Worried About Running Out of Food in the Last Year:     Ran Out of Food in the Last Year:    Transportation Needs:     Lack of Transportation (Medical):      Lack of Transportation (Non-Medical):    Physical Activity:     Days of Exercise per Week:     Minutes of Exercise per Session:    Stress:     Feeling of Stress :    Social Connections:     Frequency of Communication with Friends and Family:     Frequency of Social Gatherings with Friends and Family:     Attends [employer] Services:     Active Member of Clubs or Organizations:     Attends Club or Organization Meetings:     Marital Status:    Intimate Partner Violence:     Fear of Current or Ex-Partner:     Emotionally Abused:     Physically Abused:     Sexually Abused:        Family History:     Family History   Problem Relation Age of Onset    Stroke Mother     Cancer Father     Diabetes Paternal Aunt        Current Medications:        Current Facility-Administered Medications:     oxyCODONE-acetaminophen (PERCOCET) 5-325 MG per tablet 1 tablet, 1 tablet, Oral, Q4H PRN, Abbey Olivares MD    epoetin nadya-epbx (RETACRIT) injection 4,000 Units, 4,000 Units, Intravenous, Once per day on Mon Wed Fri, KEMI Browning, 4,000 Units at 05/21/21 1010    sodium chloride flush 0.9 % injection 5-40 mL, 5-40 mL, Intravenous, 2 times per day, KEMI Latham CNP, 10 mL at 05/21/21 0801    sodium chloride flush 0.9 % injection 5-40 mL, 5-40 mL, Intravenous, PRN, KEMI Day CNP    0.9 % sodium chloride infusion, 25 mL, Intravenous, PRN, KEMI Latham CNP    promethazine (PHENERGAN) tablet 12.5 mg, 12.5 mg, Oral, Q6H PRN **OR** ondansetron (ZOFRAN) injection 4 mg, 4 mg, Intravenous, Q6H PRN, KEMI Latham CNP    polyethylene glycol (GLYCOLAX) packet 17 g, 17 g, Oral, Daily PRN, KEMI Latham CNP    acetaminophen (TYLENOL) tablet 650 mg, 650 mg, Oral, Q6H PRN, 650 mg at 05/20/21 0855 **OR** acetaminophen (TYLENOL) suppository 650 mg, 650 mg, Rectal, Q6H PRN, KEMI Day CNP    heparin (porcine) injection 5,000 Units, 5,000 Units, Subcutaneous, Q8H, KEMI Yost CNP, 5,000 Units at 05/21/21 0801    piperacillin-tazobactam (ZOSYN) 3,375 mg in dextrose 5 % 100 mL IVPB extended infusion (mini-bag), 3,375 mg, Intravenous, Q12H, KEMI Reyes CNP, Stopped at 05/21/21 0826    albuterol (PROVENTIL) nebulizer solution 2.5 mg, 2.5 mg, Nebulization, Q4H PRN, KEMI Arias CNP    amLODIPine (NORVASC) tablet 5 mg, 5 mg, Oral, Daily, KEMI Arias CNP    carvedilol (COREG) tablet 50 mg, 50 mg, Oral, QAM, KEMI Arias CNP, 50 mg at 05/21/21 0802    carvedilol (COREG) tablet 25 mg, 25 mg, Oral, Nightly, KEMI Arias CNP, 25 mg at 05/20/21 2125    famotidine (PEPCID) tablet 10 mg, 10 mg, Oral, QAM, KEMI Reyes CNP, 10 mg at 05/21/21 0801    hydrALAZINE (APRESOLINE) tablet 50 mg, 50 mg, Oral, 3 times per day, KEMI Reyes CNP, 50 mg at 05/21/21 1273    vitamin D (CHOLECALCIFEROL) tablet 2,000 Units, 2,000 Units, Oral, Daily, KEMI Arias CNP, 2,000 Units at 05/21/21 0801    insulin lispro (HUMALOG) injection vial 10 Units, 10 Units, Subcutaneous, TID AC, KEMI Arias CNP, 10 Units at 05/21/21 9415    insulin glargine (LANTUS) injection vial 16 Units, 16 Units, Subcutaneous, Nightly, KEMI Arias CNP, 16 Units at 05/20/21 2117    insulin lispro (HUMALOG) injection vial 0-12 Units, 0-12 Units, Subcutaneous, TID WC, KEMI Arias CNP, 8 Units at 05/21/21 0825    insulin lispro (HUMALOG) injection vial 0-6 Units, 0-6 Units, Subcutaneous, Nightly, KEMI Yost CNP    glucose (GLUTOSE) 40 % oral gel 15 g, 15 g, Oral, PRN, KEMI Arias CNP    dextrose 50 % IV solution, 12.5 g, Intravenous, PRN, KEMI Arias CNP    glucagon (rDNA) injection 1 mg, 1 mg, Intramuscular, PRN, KEMI Arias CNP    dextrose 5 % solution, 100 mL/hr, Intravenous, PRN, KEMI Arias CNP    Post Zosyn Flush (0.9 % sodium chloride infusion), , Intravenous, Q12H, KEMI Arias CNP, Last Rate: 12.5 mL/hr at 05/21/21 0934, New Bag at 05/21/21 0934   HYDROmorphone (DILAUDID) injection 0.5 mg, 0.5 mg, Intravenous, Once, Rosana Cooks, MD    vancomycin Reba Carrizales) intermittent dosing (placeholder), , Other, Matthieu Roper MD    Allergies:  Patient has no known allergies. Review of Systems:   Pertinent positives stated above in HPI. All other systems were reviewed and were negative.     Physical exam:   Constitutional:  BP (!) 115/52   Pulse 101   Temp 98.6 °F (37 °C) (Oral)   Resp 16   Ht 4' 11\" (1.499 m)   Wt 173 lb 4.5 oz (78.6 kg)   SpO2 96%   BMI 35.00 kg/m²      Gen: alert, awake, nad  Skin: no rash on exposed skin, abscess in Right groin  Heent:  eomi, mmm, facial/periorbital edema  Neck: no jvd noted  Cardiovascular:  RRR, S1, S2 without m/r/g  Respiratory: Lungs clear upper, diminished lower  Abdomen: soft, non-tender, + bowel sounds  Ext: neg lower extremity edema, inner right groin/thigh edema -dressing intact,  LUE AVF   Psychiatric: mood and affect appropriate      Data:     CBC with Differential:    Lab Results   Component Value Date    WBC 5.4 05/21/2021    RBC 2.66 05/21/2021    RBC  08/19/2016      RBC           [id number]    transfused   08/21/16  07:02  SCC    RBC  08/19/2016      RBC           [id number]    released     08/23/16  00:54  SCC    HGB 8.0 05/21/2021    HCT 24.4 05/21/2021     05/21/2021    MCV 91.7 05/21/2021    MCH 30.1 05/21/2021    MCHC 32.8 05/21/2021    RDW 16.6 05/21/2021    SEGSPCT 53 10/08/2013    LYMPHOPCT 15.7 05/20/2021    MONOPCT 6.4 05/20/2021    BASOPCT 0.3 05/20/2021    MONOSABS 0.43 05/20/2021    LYMPHSABS 1.05 05/20/2021    EOSABS 0.27 05/20/2021    BASOSABS 0.02 05/20/2021     CMP:    Lab Results   Component Value Date     05/21/2021    K 4.2 05/21/2021    K 3.9 05/20/2021    CL 93 05/21/2021    CO2 29 05/21/2021    BUN 17 05/21/2021    CREATININE 5.9 05/21/2021    GFRAA 10 05/21/2021    LABGLOM 10 05/21/2021    GLUCOSE 138 05/21/2021    GLUCOSE 279 07/27/2011    PROT 7.2 05/19/2021    LABALBU 3.5 05/19/2021    LABALBU 3.9 01/10/2011    CALCIUM 8.6 05/21/2021    BILITOT 0.4 05/19/2021    ALKPHOS 122 05/19/2021    AST 34 05/19/2021    ALT 32 05/19/2021     Magnesium:    Lab Results   Component Value Date    MG 2.2 02/01/2021     Phosphorus:    Lab Results   Component Value Date    PHOS 6.6 02/01/2021     HgBA1c:    Lab Results   Component Value Date    LABA1C 9.1 01/27/2021     VITAMIN B12: No components found for: B12  FOLATE:    Lab Results   Component Value Date    FOLATE 19.4 01/27/2021     IRON:    Lab Results   Component Value Date    IRON 50 01/27/2021     Iron Saturation:  No components found for: PERCENTFE  TIBC:    Lab Results   Component Value Date    TIBC 173 01/27/2021     FERRITIN:    Lab Results   Component Value Date    FERRITIN 613 01/29/2021       Assessment/Plan    1. ESRD dependent on IHD  Dialyzes at [facility] [place]  MWF  Continue MWF dialysis       2. Htn with CKD 5/ESRD  BP at goal <140/90  Abd CT shows 3rd spacing of fluid  Volume removal as tolerated on IHD  Continue home meds: amlodipine 5 mg and carvedilol 25 mg bid, and hydralazine 50 mg Q 8 hrs   Patient has not taken amlodipine for @ 2 weeks, she believes it is causing hair loss. Bps at target off amlodipine, d/c amlodipune     3. DM   Primary following     4. Anemia in CKD  Hgb below goal >10  Transfuse < 7.0  Iron 50, iron sat 29, TIBC 173, folate 19.4, Vit B-12 784, Ferritin 613  Start VALENTIN     5. Sec HPTH of renal origin  PO4 5.7        6.  Right groin abscess  Surgery consulted, I&D 5/19  On Zosyn, clindamycin      Thank you for the opportunity to participate in care of [address], APRN - CNS  Pt seen and examined agree with above  [address] for Sarah Crystal MD

## 2021-05-21 NOTE — FLOWSHEET NOTE
05/21/21 1746   Vital Signs   BP (!) 168/80   Temp 97.8 °F (36.6 °C)   Pulse 99   Resp 16   Weight 161 lb 13.1 oz (73.4 kg)   Weight Method Bed scale   Percent Weight Change -4.3   Post-Hemodialysis Assessment   Post-Treatment Procedures Blood returned; Access bleeding time < 10 minutes   Machine Disinfection Process Acid/Vinegar Clean;Heat Disinfect; Exterior Machine Disinfection   Rinseback Volume (ml) 300 ml   Total Liters Processed (l/min) 98.5 l/min   Dialyzer Clearance Lightly streaked   Duration of Treatment (minutes) 240 minutes   Heparin amount administered during treatment (units) 0 units   Hemodialysis Intake (ml) 300 ml   Hemodialysis Output (ml) 3600 ml   NET Removed (ml) 3300 ml   Tolerated Treatment Good   Patient Response to Treatment tolerated well, blood returned, needles pulled, sites held, stasis achieved, bandaids applied, +thrill, +bruit

## 2021-05-21 NOTE — PROGRESS NOTES
Department of Internal Medicine  Infectious Diseases  Progress Note      C/C :  Right groin abscess     Pt denies fever and chills  Reports pain in the wound   Afebrile       Current Facility-Administered Medications   Medication Dose Route Frequency Provider Last Rate Last Admin    vancomycin 1000 mg IVPB in 250 mL D5W addavial  1,000 mg Intravenous Once Rene Sic, APRN - CNP        oxyCODONE-acetaminophen (PERCOCET) 5-325 MG per tablet 1 tablet  1 tablet Oral Q4H PRN Abbey Olivares MD        epoetin nadya-epbx (RETACRIT) injection 4,000 Units  4,000 Units Intravenous Once per day on Mon Wed Fri David David APRN - CNS   4,000 Units at 05/21/21 1010    sodium chloride flush 0.9 % injection 5-40 mL  5-40 mL Intravenous 2 times per day Rene Sic, APRN - CNP   10 mL at 05/21/21 0801    sodium chloride flush 0.9 % injection 5-40 mL  5-40 mL Intravenous PRN Rene Sic, APRN - CNP        0.9 % sodium chloride infusion  25 mL Intravenous PRN Rene Sic, APRN - CNP        promethazine (PHENERGAN) tablet 12.5 mg  12.5 mg Oral Q6H PRN Rene Sic, APRN - CNP        Or    ondansetron (ZOFRAN) injection 4 mg  4 mg Intravenous Q6H PRN Rene Sic, APRN - CNP        polyethylene glycol (GLYCOLAX) packet 17 g  17 g Oral Daily PRN Rene Sic, APRN - CNP        acetaminophen (TYLENOL) tablet 650 mg  650 mg Oral Q6H PRN Rene Sic, APRN - CNP   650 mg at 05/20/21 0855    Or    acetaminophen (TYLENOL) suppository 650 mg  650 mg Rectal Q6H PRN Rene Sic, APRN - CNP        heparin (porcine) injection 5,000 Units  5,000 Units Subcutaneous Q8H Bia Lezama APRN - CNP   5,000 Units at 05/21/21 0801    piperacillin-tazobactam (ZOSYN) 3,375 mg in dextrose 5 % 100 mL IVPB extended infusion (mini-bag)  3,375 mg Intravenous Q12H Rene Sic, APRN - CNP   Stopped at 05/21/21 0826    albuterol (PROVENTIL) nebulizer solution 2.5 mg  2.5 mg Nebulization Q4H PRN Bia Ojeda Lise, APRN - CNP        carvedilol (COREG) tablet 50 mg  50 mg Oral QAM KEMI Powers CNP   50 mg at 05/21/21 0802    carvedilol (COREG) tablet 25 mg  25 mg Oral Nightly KEMI Powers CNP   25 mg at 05/20/21 2125    famotidine (PEPCID) tablet 10 mg  10 mg Oral QAM KEMI Powers CNP   10 mg at 05/21/21 0801    hydrALAZINE (APRESOLINE) tablet 50 mg  50 mg Oral 3 times per day KEMI Powers CNP   50 mg at 05/21/21 2528    vitamin D (CHOLECALCIFEROL) tablet 2,000 Units  2,000 Units Oral Daily KEMI Powers CNP   2,000 Units at 05/21/21 0801    insulin lispro (HUMALOG) injection vial 10 Units  10 Units Subcutaneous TID AC KEMI Powers CNP   10 Units at 05/21/21 2820    insulin glargine (LANTUS) injection vial 16 Units  16 Units Subcutaneous Nightly KEMI Powers CNP   16 Units at 05/20/21 2117    insulin lispro (HUMALOG) injection vial 0-12 Units  0-12 Units Subcutaneous TID WC KEMI Powers CNP   8 Units at 05/21/21 0825    insulin lispro (HUMALOG) injection vial 0-6 Units  0-6 Units Subcutaneous Nightly KEMI Powers CNP        glucose (GLUTOSE) 40 % oral gel 15 g  15 g Oral PRN KEMI Powers CNP        dextrose 50 % IV solution  12.5 g Intravenous PRN KEMI Powers CNP        glucagon (rDNA) injection 1 mg  1 mg Intramuscular PRN KEMI Powers CNP        dextrose 5 % solution  100 mL/hr Intravenous PRN KEMI Powers CNP        Post Zosyn Flush (0.9 % sodium chloride infusion)   Intravenous Q12H KEMI Powers CNP   Stopped at 05/21/21 1134    HYDROmorphone (DILAUDID) injection 0.5 mg  0.5 mg Intravenous Once Libertad Brothers MD        vancomycin Jun Espinoza) intermittent dosing (placeholder)   Other RX Placeholder Ina Kline MD           REVIEW OF SYSTEMS:    CONSTITUTIONAL:  Denies fever, chill or rigors.   HEENT: denies blurring of vision or double vision, denies hearing problem  RESPIRATORY: denies cough, shortness of breath, sputum expectoration, chest pain. CARDIOVASCULAR:  Denies palpitation  GASTROINTESTINAL:  Denies abdomen pain, diarrhea or constipation. GENITOURINARY:  ESRD on HD   INTEGUMENT: denies wound , rash  HEMATOLOGIC/LYMPHATIC:  Denies lymph node swelling, gum bleeding or easy bruising. MUSCULOSKELETAL:  Right groin abscess   NEUROLOGICAL:  Denies light headed, dizziness, loss of consciousness, weakness of lower extremities, bowel or bladder incontinence. PHYSICAL EXAM:      Vitals:     BP (!) 180/80   Pulse 102   Temp 97.7 °F (36.5 °C)   Resp 16   Ht 4' 11\" (1.499 m)   Wt 169 lb 1.5 oz (76.7 kg)   SpO2 96%   BMI 34.15 kg/m²     General Appearance:    Awake, alert , no acute distress. Head:    Normocephalic, atraumatic   Eyes:    +  pallor, no icterus,   Ears:    No obvious deformity or drainage.    Nose:   No nasal drainage   Throat:   Mucosa moist, no oral thrush   Neck:   Supple, no lymphadenopathy   Lungs:     Clear to auscultation bilaterally   Heart:    Regular rate and rhythm, no murmur   Abdomen:     Soft, non-tender, bowel sounds present    Extremities:    Left arm AV fistula, right thigh wound packed with guaze, tender, indurated    Pulses:   Dorsalis pedis palpable    Skin:  Right proximal thigh  wound -dressed        CBC with Differential:      Lab Results   Component Value Date    WBC 5.4 05/21/2021    RBC 2.66 05/21/2021    RBC  08/19/2016      RBC           [id number]    transfused   08/21/16  07:02  SCC    RBC  08/19/2016      RBC           [id number]    released     08/23/16  00:54  SCC    HGB 8.0 05/21/2021    HCT 24.4 05/21/2021     05/21/2021    MCV 91.7 05/21/2021    MCH 30.1 05/21/2021    MCHC 32.8 05/21/2021    RDW 16.6 05/21/2021    SEGSPCT 53 10/08/2013    LYMPHOPCT 15.7 05/20/2021    MONOPCT 6.4 05/20/2021    BASOPCT 0.3 05/20/2021    MONOSABS 0.43 05/20/2021    LYMPHSABS 1.05 05/20/2021    EOSABS 0.27 05/20/2021    BASOSABS 0.02 05/20/2021       CMP     Lab Results   Component Value Date     05/21/2021    K 4.2 05/21/2021    K 3.9 05/20/2021    CL 93 05/21/2021    CO2 29 05/21/2021    BUN 17 05/21/2021    CREATININE 5.9 05/21/2021    GFRAA 10 05/21/2021    LABGLOM 10 05/21/2021    GLUCOSE 138 05/21/2021    GLUCOSE 279 07/27/2011    PROT 7.2 05/19/2021    LABALBU 3.5 05/19/2021    LABALBU 3.9 01/10/2011    CALCIUM 8.6 05/21/2021    BILITOT 0.4 05/19/2021    ALKPHOS 122 05/19/2021    AST 34 05/19/2021    ALT 32 05/19/2021         Hepatic Function Panel:    Lab Results   Component Value Date    ALKPHOS 122 05/19/2021    ALT 32 05/19/2021    AST 34 05/19/2021    PROT 7.2 05/19/2021    BILITOT 0.4 05/19/2021    LABALBU 3.5 05/19/2021    LABALBU 3.9 01/10/2011       PT/INR:    Lab Results   Component Value Date    PROTIME 12.2 11/26/2018    INR 1.1 11/26/2018       TSH:    Lab Results   Component Value Date    TSH 3.850 01/27/2021       U/A:    Lab Results   Component Value Date    COLORU Yellow 11/23/2017    PHUR 8.5 11/23/2017    LABCAST FEW  09/29/2013    LABCAST FEW  09/29/2013    WBCUA 2-5 11/23/2017    WBCUA NONE 11/21/2010    RBCUA 5-10 11/23/2017    RBCUA 0-1 09/29/2013    TRICHOMONAS RARE 08/16/2016    BACTERIA RARE 11/23/2017    CLARITYU Clear 11/23/2017    SPECGRAV 1.015 11/23/2017    LEUKOCYTESUR Negative 11/23/2017    UROBILINOGEN 0.2 11/23/2017    BILIRUBINUR Negative 11/23/2017    BILIRUBINUR NEGATIVE 11/21/2010    BLOODU MODERATE 11/23/2017    GLUCOSEU 500 11/23/2017    GLUCOSEU >=1000 11/21/2010       ABG:  No results found for: UNI7XMW, BEART, L1QHQQUH, PHART, THGBART, HRM4WEI, PO2ART, SIR7AZT    MICROBIOLOGY:        Wound Culture -     Gram stain performed from swab, interpret results with   caution. Swab specimens of sterile fluids are inferior to   aspirate specimens for organism recovery.    Moderate Polymorphonuclear leukocytes   Epithelial cells not seen Abundant Gram negative rods     Narrative:       Culture, Wound [[id number]] (Abnormal)    Collected: 05/19/21 1821    Updated: 05/21/21 1402    Specimen Source: Abscess     WOUND/ABSCESS --Abnormal     Growth present, evaluating for:   Gram positive organisms   Abnormal     Gram Stain Result Gram stain performed from swab, interpret results with   caution. Swab specimens of sterile fluids are inferior to   aspirate specimens for organism recovery. Moderate Polymorphonuclear leukocytes   Epithelial cells not seen   Abundant Gram negative rods     Organism Staphylococcus speciesAbnormal     WOUND/ABSCESS --    Moderate growth   Identification and sensitivity to follow     Organism Corynebacterium speciesAbnormal     WOUND/ABSCESS Light growth   Narrative:     Source: ABSC       Site: Groin               Vanco random 6.9      Radiology :    CT scan abdomen and pelvis -  Impression:        1. 6.1 x 1.8 cm mildly rim-enhancing subcutaneous fluid collection in the   proximal medial thigh may represent an abscess.  No extension to the labia,   perineum or the pelvis. 2. Third-spacing of fluid, including interstitial and alveolar edema, small   bilateral pleural effusions, small to moderate volume ascites and moderate   anasarca. 3. Inhomogeneous enhancement of the liver is nonspecific and may signify   congestive hepatopathy. 4. Cholelithiasis. 5. Bilaterally atrophic kidneys. 6. Mildly increased sclerosis in the bones may represent renal osteodystrophy.            IMPRESSION:     1. Right thigh abscess s/p I & D  2. ESRD on HD     RECOMMENDATIONS:    1. Vancomycin 1 gram IV q HD and Cefepime 2 gram IV q HD for 10 days

## 2021-05-21 NOTE — PROGRESS NOTES
Hospitalist Progress Note      Synopsis: Patient admitted on 5/19/2021 for right thigh abscess s/p I&D. With hx of ESRD for which nephrology is consulted     Subjective    Clinically improving. Feeling better. Stable overnight. No other overnight issues reported. No CP, SOB, palpitations, blurred vision, HA, lightheadedness, LOC or focal neurological deficits    Exam:  BP (!) 178/77   Pulse 94   Temp 97.7 °F (36.5 °C)   Resp 16   Ht 4' 11\" (1.499 m)   Wt 169 lb 1.5 oz (76.7 kg)   SpO2 96%   BMI 34.15 kg/m²     General appearance: No distress, appears stated age and cooperative. HEENT: Normal cephalic, atraumatic without obvious deformity. Pupils equal, round, and reactive to light. Facial edema noted. Neck: Supple, with full range of motion. No jugular venous distention. Trachea midline. Respiratory:  Clear to auscultation bilaterally. No apparent distress. Cardiovascular:  Regular rate and rhythm. S1, S2 without murmurs, rubs, or gallops. PV: Brisk capillary refill. +2 pedal and radial pulses bilaterally. + edema BLE; BISHNU AVF with + bruit, thrill  Abdomen: Soft, non-tender, non-distended. +BS  Musculoskeletal: No obvious deformities or erythematous or edematous joints. Skin: Normal skin color. Erythema, gauze overlying R groin   Neurologic:  Neurovascularly intact without any focal sensory/motor deficits.   Psychiatric: Alert and oriented, thought content appropriate, normal insight    Medications:  Reviewed    Infusion Medications    sodium chloride      dextrose       Scheduled Medications    vancomycin  1,000 mg Intravenous Once    epoetin nadya-epbx  4,000 Units Intravenous Once per day on Mon Wed Fri    sodium chloride flush  5-40 mL Intravenous 2 times per day    heparin (porcine)  5,000 Units Subcutaneous Q8H    piperacillin-tazobactam  3,375 mg Intravenous Q12H    carvedilol  50 mg Oral QAM    carvedilol  25 mg Oral Nightly    famotidine  10 mg Oral QAM    hydrALAZINE 50 mg Oral 3 times per day    vitamin D  2,000 Units Oral Daily    insulin lispro  10 Units Subcutaneous TID AC    insulin glargine  16 Units Subcutaneous Nightly    insulin lispro  0-12 Units Subcutaneous TID WC    insulin lispro  0-6 Units Subcutaneous Nightly    sodium chloride   Intravenous Q12H    HYDROmorphone  0.5 mg Intravenous Once    vancomycin (VANCOCIN) intermittent dosing (placeholder)   Other RX Placeholder     PRN Meds: oxyCODONE-acetaminophen, sodium chloride flush, sodium chloride, promethazine **OR** ondansetron, polyethylene glycol, acetaminophen **OR** acetaminophen, albuterol, glucose, dextrose, glucagon (rDNA), dextrose    I/O    Intake/Output Summary (Last 24 hours) at 5/21/2021 1552  Last data filed at 5/21/2021 1134  Gross per 24 hour   Intake 665 ml   Output 1800 ml   Net -1135 ml       Labs:   Recent Labs     05/19/21  1326 05/20/21  0802 05/21/21  0839   WBC 8.3 6.7 5.4   HGB 8.5* 8.3* 8.0*   HCT 24.7* 24.3* 24.4*    185 201       Recent Labs     05/19/21  1326 05/20/21  0802 05/21/21  0839    138 136   K 3.9 3.9 4.2   CL 93* 94* 93*   CO2 30* 27 29   BUN 29* 32* 17   CREATININE 8.0* 9.2* 5.9*   CALCIUM 9.1 8.7 8.6       Recent Labs     05/19/21  1326   PROT 7.2   ALKPHOS 122*   ALT 32   AST 34*   BILITOT 0.4       No results for input(s): INR in the last 72 hours. No results for input(s): Noris Councilman in the last 72 hours. Chronic labs:  Lab Results   Component Value Date    CHOL 155 10/08/2013    TRIG 68 10/08/2013    HDL 40 01/29/2021    LDLCALC 41 01/29/2021    TSH 3.850 01/27/2021    INR 1.1 11/26/2018    LABA1C 9.1 (H) 01/27/2021       Radiology:  Imaging studies reviewed today.     ASSESSMENT:  R groin abscess  ESRD  HTN  DM2  Anemia in CKD     PLAN:  Continue antibiotics, follow cultures  Appreciate ID input  Strict I/O  Nephrology consulted for dialysis needs  Glycemic control   PT/OT      Diet: DIET RENAL; Carb Control: 4 carb choices (60 gms)/meal  Code Status: Full Code  PT/OT Eval Status:   ordered  DVT Prophylaxis:   heparin  Recommended disposition at discharge:  pending medical progression     +++++++++++++++++++++++++++++++++++++++++++++++++  Abbey Mathews MD   [facility].  +++++++++++++++++++++++++++++++++++++++++++++++++  NOTE: This report was transcribed using voice recognition software. Every effort was made to ensure accuracy; however, inadvertent computerized transcription errors may be present.

## 2021-05-21 NOTE — PROGRESS NOTES
Pharmacy Consultation Note  (Antibiotic Dosing and Monitoring)    Initial consult date: 21  Consulting physician:KEYA Maria  Drug(s): Vancomycin  Indication: SSTI    Ht Readings from Last 1 Encounters:   21 4' 11\" (1.499 m)       Age/Gender IBW DW  Allergy Information   38 y.o.  female  62.1 kg  Patient has no known allergies. Date  WBC BUN/sCr UOP (mL/kg/hr) Drug/Dose Time   Given Level(s)   (Time) Comments     (#1) 8.3 29/8.0 --- Vancomycin 1000 mg IV x1 1513       (#2) 6.7 ESRD HD X 4 hr  1222 -1624 Vancomycin 750 mg IV x 1 <2100>- order dc'[email or web address] [email or web address]      (#3) 5.4 ESRD HD today Vancomycin 1000 mg IV x 1 dose (1600) Random level 6.9 @0839      (#4)            (#5)            (#6)            Other Antibiotics/Antifungals/Antivirals Start Date Stop Date Comments   Zosyn      Clindamycin                      Estimated CrCL:      Intake/Output Summary (Last 24 hours) at 2021 1541  Last data filed at 2021 1134  Gross per 24 hour   Intake 665 ml   Output 1800 ml   Net -1135 ml       Temp max: Temp (24hrs), Av.3 °F (36.8 °C), Min:97.3 °F (36.3 °C), Max:99.7 °F (37.6 °C)      Cultures:    Culture Date Result                              Assessment:  · Consulted by KEYA Dempsey to dose/monitor vancomycin  · Goal trough level:  15-20 mcg/mL  · 38 YOF with right groin abscess started on Vancomycin/Zosyn  · ESRD on IHD   ·  4 hr HD   · : HD again today  Plan:  · Vancomycin 1000 mg IV x 1 dose after HD  · Future doses to be guided by levels and dialysis schedule,   · Pharmacist will follow and monitor/adjust dosing as necessary    Thank you for this consult, please call with questions.     Maki Rodriguez, AshantiD, BCPS 2021 3:47 PM

## 2021-05-22 VITALS
DIASTOLIC BLOOD PRESSURE: 67 MMHG | RESPIRATION RATE: 18 BRPM | BODY MASS INDEX: 32.62 KG/M2 | SYSTOLIC BLOOD PRESSURE: 140 MMHG | WEIGHT: 161.82 LBS | HEIGHT: 59 IN | HEART RATE: 98 BPM | OXYGEN SATURATION: 93 % | TEMPERATURE: 98.6 F

## 2021-05-22 LAB
ANAEROBIC CULTURE: ABNORMAL
ANION GAP SERPL CALCULATED.3IONS-SCNC: 8 MMOL/L (ref 7–16)
BUN BLDV-MCNC: 9 MG/DL (ref 6–20)
CALCIUM SERPL-MCNC: 9 MG/DL (ref 8.6–10.2)
CHLORIDE BLD-SCNC: 94 MMOL/L (ref 98–107)
CO2: 34 MMOL/L (ref 22–29)
CREAT SERPL-MCNC: 3.7 MG/DL (ref 0.5–1)
GFR AFRICAN AMERICAN: 17
GFR NON-AFRICAN AMERICAN: 17 ML/MIN/1.73
GLUCOSE BLD-MCNC: 234 MG/DL (ref 74–99)
GRAM STAIN RESULT: ABNORMAL
HCT VFR BLD CALC: 25.4 % (ref 34–48)
HEMOGLOBIN: 8.5 G/DL (ref 11.5–15.5)
MCH RBC QN AUTO: 30.6 PG (ref 26–35)
MCHC RBC AUTO-ENTMCNC: 33.5 % (ref 32–34.5)
MCV RBC AUTO: 91.4 FL (ref 80–99.9)
METER GLUCOSE: 266 MG/DL (ref 74–99)
METER GLUCOSE: 282 MG/DL (ref 74–99)
ORGANISM: ABNORMAL
PDW BLD-RTO: 16.2 FL (ref 11.5–15)
PLATELET # BLD: 226 E9/L (ref 130–450)
PMV BLD AUTO: 10.8 FL (ref 7–12)
POTASSIUM SERPL-SCNC: 3.9 MMOL/L (ref 3.5–5)
RBC # BLD: 2.78 E12/L (ref 3.5–5.5)
SODIUM BLD-SCNC: 136 MMOL/L (ref 132–146)
WBC # BLD: 5.9 E9/L (ref 4.5–11.5)
WOUND/ABSCESS: ABNORMAL
WOUND/ABSCESS: ABNORMAL

## 2021-05-22 PROCEDURE — 2580000003 HC RX 258: Performed by: NURSE PRACTITIONER

## 2021-05-22 PROCEDURE — 6360000002 HC RX W HCPCS: Performed by: NURSE PRACTITIONER

## 2021-05-22 PROCEDURE — 80048 BASIC METABOLIC PNL TOTAL CA: CPT

## 2021-05-22 PROCEDURE — 36415 COLL VENOUS BLD VENIPUNCTURE: CPT

## 2021-05-22 PROCEDURE — 6370000000 HC RX 637 (ALT 250 FOR IP): Performed by: NURSE PRACTITIONER

## 2021-05-22 PROCEDURE — 82962 GLUCOSE BLOOD TEST: CPT

## 2021-05-22 PROCEDURE — 85027 COMPLETE CBC AUTOMATED: CPT

## 2021-05-22 RX ADMIN — Medication 10 ML: at 08:25

## 2021-05-22 RX ADMIN — FAMOTIDINE 10 MG: 20 TABLET ORAL at 08:24

## 2021-05-22 RX ADMIN — INSULIN LISPRO 10 UNITS: 100 INJECTION, SOLUTION INTRAVENOUS; SUBCUTANEOUS at 12:26

## 2021-05-22 RX ADMIN — INSULIN LISPRO 6 UNITS: 100 INJECTION, SOLUTION INTRAVENOUS; SUBCUTANEOUS at 12:26

## 2021-05-22 RX ADMIN — CARVEDILOL 50 MG: 25 TABLET, FILM COATED ORAL at 08:24

## 2021-05-22 RX ADMIN — HEPARIN SODIUM 5000 UNITS: 10000 INJECTION INTRAVENOUS; SUBCUTANEOUS at 08:26

## 2021-05-22 RX ADMIN — Medication 2000 UNITS: at 08:24

## 2021-05-22 RX ADMIN — HYDRALAZINE HYDROCHLORIDE 50 MG: 25 TABLET, FILM COATED ORAL at 06:16

## 2021-05-22 ASSESSMENT — PAIN SCALES - GENERAL: PAINLEVEL_OUTOF10: 0

## 2021-05-22 NOTE — CARE COORDINATION
Notified of discharge and scripts for ABX with dialysis days. Patient goes to SUSAN MYERS, RN to fax scripts to them at [phone] and patient can resume dialysis on Monday at normal chair time. Attempted to reach [employer] and no one answered there, left detailed message. Notified Luis Ellison at [facility] of patient getting ABX with dialysis now and not needing IV ABX. At home.

## 2021-05-22 NOTE — PROGRESS NOTES
Department of Internal Medicine  Infectious Diseases  Progress Note      C/C :  Right groin abscess     Pt denies fever and chills  Reports less pain in the wound, no drainage  Afebrile       Current Facility-Administered Medications   Medication Dose Route Frequency Provider Last Rate Last Admin    oxyCODONE-acetaminophen (PERCOCET) 5-325 MG per tablet 1 tablet  1 tablet Oral Q4H PRN Abbey Olivares MD        epoetin nadya-epbx (RETACRIT) injection 4,000 Units  4,000 Units Intravenous Once per day on Mon Wed Fri KEMI Nielson - CNS   4,000 Units at 05/21/21 1010    sodium chloride flush 0.9 % injection 5-40 mL  5-40 mL Intravenous 2 times per day KEMI Smith - CNP   10 mL at 05/22/21 0825    sodium chloride flush 0.9 % injection 5-40 mL  5-40 mL Intravenous PRN Brannon Urias APRN - CNP   10 mL at 05/21/21 2150    0.9 % sodium chloride infusion  25 mL Intravenous PRN Brannon Urias APRN - CNP        promethazine (PHENERGAN) tablet 12.5 mg  12.5 mg Oral Q6H PRN Brannon Urias APRN - CNP        Or    ondansetron (ZOFRAN) injection 4 mg  4 mg Intravenous Q6H PRN Brannon Urias, APRN - CNP        polyethylene glycol (GLYCOLAX) packet 17 g  17 g Oral Daily PRN Dellaen Adonay, APRN - CNP        acetaminophen (TYLENOL) tablet 650 mg  650 mg Oral Q6H PRN Brannon Urias APRN - CNP   650 mg at 05/20/21 0855    Or    acetaminophen (TYLENOL) suppository 650 mg  650 mg Rectal Q6H PRN Brannon Urias, APRN - CNP        heparin (porcine) injection 5,000 Units  5,000 Units Subcutaneous Q8H KEMI Vickers - CNP   5,000 Units at 05/22/21 0826    albuterol (PROVENTIL) nebulizer solution 2.5 mg  2.5 mg Nebulization Q4H PRN Brannon Urias APRN - CNP        carvedilol (COREG) tablet 50 mg  50 mg Oral QAM KEMI Vickers - CNP   50 mg at 05/22/21 0824    carvedilol (COREG) tablet 25 mg  25 mg Oral Nightly KEMI Smith CNP   25 mg at 05/21/21 2034    famotidine (PEPCID) tablet 10 mg  10 mg Oral QAM KEMI Stevenson CNP   10 mg at 05/22/21 0285    hydrALAZINE (APRESOLINE) tablet 50 mg  50 mg Oral 3 times per day KEMI Stevenson CNP   50 mg at 05/22/21 1899    vitamin D (CHOLECALCIFEROL) tablet 2,000 Units  2,000 Units Oral Daily KEMI Stevenson CNP   2,000 Units at 05/22/21 0824    insulin lispro (HUMALOG) injection vial 10 Units  10 Units Subcutaneous TID AC KEMI Stevenson CNP   10 Units at 05/22/21 1226    insulin glargine (LANTUS) injection vial 16 Units  16 Units Subcutaneous Nightly KEMI Stevenson CNP   16 Units at 05/21/21 2040    insulin lispro (HUMALOG) injection vial 0-12 Units  0-12 Units Subcutaneous TID  KEMI Stevenson CNP   6 Units at 05/22/21 1226    insulin lispro (HUMALOG) injection vial 0-6 Units  0-6 Units Subcutaneous Nightly KEMI Stevenson CNP   2 Units at 05/21/21 2037    glucose (GLUTOSE) 40 % oral gel 15 g  15 g Oral PRN KEMI Stevenson CNP        dextrose 50 % IV solution  12.5 g Intravenous PRN KEMI Stevenson CNP        glucagon (rDNA) injection 1 mg  1 mg Intramuscular PRN KEMI Stevenson CNP        dextrose 5 % solution  100 mL/hr Intravenous PRN KEMI Stevenson CNP        HYDROmorphone (DILAUDID) injection 0.5 mg  0.5 mg Intravenous Once Nico Bullock MD        vancomycin Suyapa Edie) intermittent dosing (placeholder)   Other RX Cristobal Lindo MD         Current Outpatient Medications   Medication Sig Dispense Refill    vancomycin (VANCOCIN) infusion Infuse 1,000 mg intravenously three times a week for 10 days Vancomycin 1 gram IV q HD for  10 days 5 g 0    ceFEPIme (MAXIPIME) 2 g injection Infuse 2,000 mg intravenously three times a week for 10 days Cefepime 2 grams IV q HD for 10 days 36889 mg 0    carvedilol (COREG) 25 MG tablet Take 25 mg by mouth nightly      B Complex-C-Folic Acid (YOLANDA-JACOB) TABS TAKE 1 TABLET BY MOUTH DAILY WITH SUPPER 30 tablet 5    hydrALAZINE (APRESOLINE) 50 MG tablet Take 1 tablet by mouth every 8 hours 90 tablet 3    Cholecalciferol (VITAMIN D3) 50 MCG (2000 UT) CAPS Take 2,000 Units by mouth daily      Insulin Glargine, 2 Unit Dial, (TOUJEO MAX SOLOSTAR) 300 UNIT/ML SOPN Inject 16 Units into the skin nightly       insulin lispro, 1 Unit Dial, (HUMALOG KWIKPEN) 100 UNIT/ML SOPN Inject 10 Units into the skin 3 times daily (before meals) *Plus Sliding Scale*      famotidine (PEPCID) 20 MG tablet Take 1 tablet by mouth every morning 30 tablet 3    medroxyPROGESTERone (DEPO-PROVERA) 150 MG/ML injection Inject 150 mg into the muscle every 3 months   3    albuterol sulfate  (90 Base) MCG/ACT inhaler Inhale 2 puffs into the lungs every 4-6 hours as needed for Wheezing or Shortness of Breath 1 Inhaler 0    carvedilol (COREG) 25 MG tablet Take 50 mg by mouth every morning   5       REVIEW OF SYSTEMS:    CONSTITUTIONAL:  Denies fever, chill or rigors. HEENT: denies blurring of vision or double vision, denies hearing problem  RESPIRATORY: denies cough, shortness of breath, sputum expectoration, chest pain. CARDIOVASCULAR:  Denies palpitation  GASTROINTESTINAL:  Denies abdomen pain, diarrhea or constipation. GENITOURINARY:  ESRD on HD   INTEGUMENT: denies wound , rash  HEMATOLOGIC/LYMPHATIC:  Denies lymph node swelling, gum bleeding or easy bruising. MUSCULOSKELETAL:  Right groin abscess   NEUROLOGICAL:  Denies light headed, dizziness, loss of consciousness, weakness of lower extremities, bowel or bladder incontinence. PHYSICAL EXAM:      Vitals:     BP (!) 140/67   Pulse 98   Temp 98.6 °F (37 °C) (Temporal)   Resp 18   Ht 4' 11\" (1.499 m)   Wt 161 lb 13.1 oz (73.4 kg)   SpO2 93%   BMI 32.68 kg/m²     General Appearance:    Awake, alert , no acute distress. Head:    Normocephalic, atraumatic   Eyes:    +  pallor, no icterus,   Ears:    No obvious deformity or drainage. Nose:   No nasal drainage   Throat:   Mucosa moist, no oral thrush   Neck:   Supple, no lymphadenopathy   Lungs:     Clear to auscultation bilaterally   Heart:    Regular rate and rhythm, no murmur   Abdomen:     Soft, non-tender, bowel sounds present    Extremities:    Left arm AV fistula, right thigh wound packed with guaze, tender, indurated    Pulses:   Dorsalis pedis palpable    Skin:  Right proximal thigh  wound -dressed        CBC with Differential:      Lab Results   Component Value Date    WBC 5.9 05/22/2021    RBC 2.78 05/22/2021    RBC  08/19/2016      RBC           [id number]    transfused   08/21/16  07:02  SCC    RBC  08/19/2016      RBC           [id number]    released     08/23/16  00:54  SCC    HGB 8.5 05/22/2021    HCT 25.4 05/22/2021     05/22/2021    MCV 91.4 05/22/2021    MCH 30.6 05/22/2021    MCHC 33.5 05/22/2021    RDW 16.2 05/22/2021    SEGSPCT 53 10/08/2013    LYMPHOPCT 15.7 05/20/2021    MONOPCT 6.4 05/20/2021    BASOPCT 0.3 05/20/2021    MONOSABS 0.43 05/20/2021    LYMPHSABS 1.05 05/20/2021    EOSABS 0.27 05/20/2021    BASOSABS 0.02 05/20/2021       CMP     Lab Results   Component Value Date     05/22/2021    K 3.9 05/22/2021    K 3.9 05/20/2021    CL 94 05/22/2021    CO2 34 05/22/2021    BUN 9 05/22/2021    CREATININE 3.7 05/22/2021    GFRAA 17 05/22/2021    LABGLOM 17 05/22/2021    GLUCOSE 234 05/22/2021    GLUCOSE 279 07/27/2011    PROT 7.2 05/19/2021    LABALBU 3.5 05/19/2021    LABALBU 3.9 01/10/2011    CALCIUM 9.0 05/22/2021    BILITOT 0.4 05/19/2021    ALKPHOS 122 05/19/2021    AST 34 05/19/2021    ALT 32 05/19/2021         Hepatic Function Panel:    Lab Results   Component Value Date    ALKPHOS 122 05/19/2021    ALT 32 05/19/2021    AST 34 05/19/2021    PROT 7.2 05/19/2021    BILITOT 0.4 05/19/2021    LABALBU 3.5 05/19/2021    LABALBU 3.9 01/10/2011       PT/INR:    Lab Results   Component Value Date    PROTIME 12.2 11/26/2018    INR 1.1 11/26/2018 TSH:    Lab Results   Component Value Date    TSH 3.850 01/27/2021       U/A:    Lab Results   Component Value Date    COLORU Yellow 11/23/2017    PHUR 8.5 11/23/2017    LABCAST FEW  09/29/2013    LABCAST FEW  09/29/2013    WBCUA 2-5 11/23/2017    WBCUA NONE 11/21/2010    RBCUA 5-10 11/23/2017    RBCUA 0-1 09/29/2013    TRICHOMONAS RARE 08/16/2016    BACTERIA RARE 11/23/2017    CLARITYU Clear 11/23/2017    SPECGRAV 1.015 11/23/2017    LEUKOCYTESUR Negative 11/23/2017    UROBILINOGEN 0.2 11/23/2017    BILIRUBINUR Negative 11/23/2017    BILIRUBINUR NEGATIVE 11/21/2010    BLOODU MODERATE 11/23/2017    GLUCOSEU 500 11/23/2017    GLUCOSEU >=1000 11/21/2010       ABG:  No results found for: SXJ5KAE, BEART, T1HIVUDV, PHART, THGBART, QIW5CFA, PO2ART, WOI4FST    MICROBIOLOGY:        Wound Culture -     Gram stain performed from swab, interpret results with   caution. Swab specimens of sterile fluids are inferior to   aspirate specimens for organism recovery. Moderate Polymorphonuclear leukocytes   Epithelial cells not seen   Abundant Gram negative rods     Narrative:       Culture, Wound [[id number]] (Abnormal)    Collected: 05/19/21 1821    Updated: 05/21/21 1402    Specimen Source: Abscess     WOUND/ABSCESS --Abnormal     Growth present, evaluating for:   Gram positive organisms   Abnormal     Gram Stain Result Gram stain performed from swab, interpret results with   caution. Swab specimens of sterile fluids are inferior to   aspirate specimens for organism recovery.    Moderate Polymorphonuclear leukocytes   Epithelial cells not seen   Abundant Gram negative rods     Organism Staphylococcus speciesAbnormal     WOUND/ABSCESS --    Moderate growth   Identification and sensitivity to follow     Organism Corynebacterium speciesAbnormal     WOUND/ABSCESS Light growth   Narrative:     Source: ABSC       Site: Groin               Vanco random 6.9      Radiology :    CT scan abdomen and pelvis -  Impression:        1. 6.1 x 1.8 cm mildly rim-enhancing subcutaneous fluid collection in the   proximal medial thigh may represent an abscess.  No extension to the labia,   perineum or the pelvis. 2. Third-spacing of fluid, including interstitial and alveolar edema, small   bilateral pleural effusions, small to moderate volume ascites and moderate   anasarca. 3. Inhomogeneous enhancement of the liver is nonspecific and may signify   congestive hepatopathy. 4. Cholelithiasis. 5. Bilaterally atrophic kidneys. 6. Mildly increased sclerosis in the bones may represent renal osteodystrophy.            IMPRESSION:     1. Right thigh abscess s/p I & D  2.  ESRD on HD     RECOMMENDATIONS:    1. Vancomycin 1 gram IV q HD and Cefepime 2 gram IV q HD for 10 days

## 2021-05-22 NOTE — PROGRESS NOTES
Pharmacy Consultation Note  (Antibiotic Dosing and Monitoring)    Initial consult date: 21  Consulting physician:KEYA Maria  Drug(s): Vancomycin  Indication: SSTI    Ht Readings from Last 1 Encounters:   21 4' 11\" (1.499 m)       Age/Gender IBW DW  Allergy Information   38 y.o.  female  62.1 kg  Patient has no known allergies. Date  WBC BUN/sCr UOP (mL/kg/hr) Drug/Dose Time   Given Level(s)   (Time) Comments     (#1) 8.3 29/8.0 --- Vancomycin 1000 mg IV x1 1513       (#2) 6.7 ESRD HD X 4 hr  1222 -1624 Vancomycin 750 mg IV x 1 <2100>- order dc'd [email or web address]      (#3) 5.4 ESRD HD today Vancomycin 1000 mg IV x 1 dose 1831 Random level 6.9 @0839      (#4) 5.9 ESRD -- No dose --       (#5)            (#6)            Other Antibiotics/Antifungals/Antivirals Start Date Stop Date Comments   Zosyn     Clindamycin     Cefepime 2g with HD                Estimated CrCL: HD       Intake/Output Summary (Last 24 hours) at 2021 1009  Last data filed at 2021 8200  Gross per 24 hour   Intake 1045 ml   Output 3600 ml   Net -2555 ml       Temp max: Temp (24hrs), Av.1 °F (36.7 °C), Min:97.7 °F (36.5 °C), Max:98.6 °F (37 °C)      Cultures:    Culture Date Result    Blood #1  24 hr NGTD   Blood #2   24 hr NGTD   Wound   Staph epidermidis; corynebacterium             Assessment:  · Consulted by KEYA Ugalde to dose/monitor vancomycin  · Goal trough level:  15-20 mcg/mL  · 35 YOF with right groin abscess started on Vancomycin/Zosyn  · ESRD on IHD   ·  4 hr HD   · : HD again today; zosyn changed to cefepime per ID  · : HD scheduled for MWF; no HD today  Plan:  · No vancomycin today  · Random level in AM   · Future doses to be guided by levels and dialysis schedule,   · Pharmacist will follow and monitor/adjust dosing as necessary    Thank you for this consult, please call with questions.     Yuan Monday, PharmD, BCPS 5/22/2021 10:13 AM

## 2021-05-22 NOTE — PLAN OF CARE
Problem: Pain:  Goal: Pain level will decrease  Description: Pain level will decrease  5/22/2021 1102 by Brandee Vasques RN  Outcome: Met This Shift  5/22/2021 0057 by Danelle Cook RN  Outcome: Met This Shift     Problem: Pain:  Goal: Control of acute pain  Description: Control of acute pain  5/22/2021 1102 by Brandee Vasques RN  Outcome: Met This Shift  5/22/2021 0057 by Danelle Cook RN  Outcome: Met This Shift     Problem: Falls - Risk of:  Goal: Will remain free from falls  Description: Will remain free from falls  5/22/2021 1102 by Brandee Vasques RN  Outcome: Met This Shift  5/22/2021 0057 by Danelle Cook RN  Outcome: Met This Shift

## 2021-05-24 ENCOUNTER — TELEPHONE (OUTPATIENT)
Dept: FAMILY MEDICINE CLINIC | Age: 36
End: 2021-05-24

## 2021-05-24 LAB
BLOOD CULTURE, ROUTINE: NORMAL
CULTURE, BLOOD 2: NORMAL

## 2021-05-24 NOTE — TELEPHONE ENCOUNTER
[facility] Transitions Initial Follow Up Call    Outreach made within 2 business days of discharge: Yes    Patient: Ken García Patient : 1985   MRN: [id number]  Reason for Admission: There are no discharge diagnoses documented for the most recent discharge. Discharge Date: 21       Spoke with: Left message with Alissa Degree    Discharge department/facility: home         Scheduled appointment with PCP within 7-14 days    Follow Up  No future appointments.     Jillian Lua

## 2021-05-25 ENCOUNTER — TELEPHONE (OUTPATIENT)
Dept: FAMILY MEDICINE CLINIC | Age: 36
End: 2021-05-25

## 2021-05-25 NOTE — TELEPHONE ENCOUNTER
[facility] Transitions Initial Follow Up Call    Outreach made within 2 business days of discharge: Yes    Patient: Vineet Cote Patient : 1985   MRN: [id number]  Reason for Admission: There are no discharge diagnoses documented for the most recent discharge. Discharge Date: 21       Spoke with: Irvin Figueredo     Discharge department/facility: home    TCM Interactive Patient Contact:  Was patient able to fill all prescriptions: No: Dialysis had questions and was trying to get a hold of someone to talk to. Irvin Figueredo said that phone calls were not being returned. Was patient instructed to bring all medications to the follow-up visit: no  Is patient taking all medications as directed in the discharge summary? no  Does patient understand their discharge instructions: Yes  Does patient have questions or concerns that need addressed prior to 7-14 day follow up office visit: no    Scheduled appointment with PCP within 7-14 days    Follow Up  No future appointments.     Nimo Hi

## 2021-05-27 ENCOUNTER — OFFICE VISIT (OUTPATIENT)
Dept: FAMILY MEDICINE CLINIC | Age: 36
End: 2021-05-27
Payer: MEDICARE

## 2021-05-27 VITALS — TEMPERATURE: 98.3 F | WEIGHT: 137 LBS | HEIGHT: 59 IN | BODY MASS INDEX: 27.62 KG/M2

## 2021-05-27 DIAGNOSIS — Z09 HOSPITAL DISCHARGE FOLLOW-UP: Primary | ICD-10-CM

## 2021-05-27 DIAGNOSIS — R06.02 SOB (SHORTNESS OF BREATH): ICD-10-CM

## 2021-05-27 PROCEDURE — 1111F DSCHRG MED/CURRENT MED MERGE: CPT | Performed by: NURSE PRACTITIONER

## 2021-05-27 PROCEDURE — 99214 OFFICE O/P EST MOD 30 MIN: CPT | Performed by: NURSE PRACTITIONER

## 2021-05-27 PROCEDURE — 4004F PT TOBACCO SCREEN RCVD TLK: CPT | Performed by: NURSE PRACTITIONER

## 2021-05-27 PROCEDURE — G8419 CALC BMI OUT NRM PARAM NOF/U: HCPCS | Performed by: NURSE PRACTITIONER

## 2021-05-27 PROCEDURE — G8427 DOCREV CUR MEDS BY ELIG CLIN: HCPCS | Performed by: NURSE PRACTITIONER

## 2021-05-27 RX ORDER — DIAPER,BRIEF,INFANT-TODD,DISP
EACH MISCELLANEOUS DAILY
Qty: 30 G | Refills: 2 | Status: SHIPPED
Start: 2021-05-27 | End: 2021-08-31 | Stop reason: SDUPTHER

## 2021-05-27 RX ORDER — ALBUTEROL SULFATE 90 UG/1
2 AEROSOL, METERED RESPIRATORY (INHALATION)
Qty: 1 INHALER | Refills: 2 | Status: SHIPPED
Start: 2021-05-27 | End: 2021-08-17 | Stop reason: SDUPTHER

## 2021-05-27 SDOH — ECONOMIC STABILITY: FOOD INSECURITY: WITHIN THE PAST 12 MONTHS, THE FOOD YOU BOUGHT JUST DIDN'T LAST AND YOU DIDN'T HAVE MONEY TO GET MORE.: NEVER TRUE

## 2021-05-27 SDOH — ECONOMIC STABILITY: FOOD INSECURITY: WITHIN THE PAST 12 MONTHS, YOU WORRIED THAT YOUR FOOD WOULD RUN OUT BEFORE YOU GOT MONEY TO BUY MORE.: NEVER TRUE

## 2021-05-27 ASSESSMENT — ENCOUNTER SYMPTOMS
SHORTNESS OF BREATH: 0
COUGH: 0
COLOR CHANGE: 0
CHEST TIGHTNESS: 0
NAUSEA: 0
CONSTIPATION: 0
WHEEZING: 0
DIARRHEA: 0
VOMITING: 0

## 2021-05-27 ASSESSMENT — PATIENT HEALTH QUESTIONNAIRE - PHQ9
SUM OF ALL RESPONSES TO PHQ9 QUESTIONS 1 & 2: 0
SUM OF ALL RESPONSES TO PHQ QUESTIONS 1-9: 0
1. LITTLE INTEREST OR PLEASURE IN DOING THINGS: 0
2. FEELING DOWN, DEPRESSED OR HOPELESS: 0
SUM OF ALL RESPONSES TO PHQ QUESTIONS 1-9: 0
SUM OF ALL RESPONSES TO PHQ QUESTIONS 1-9: 0

## 2021-05-27 ASSESSMENT — SOCIAL DETERMINANTS OF HEALTH (SDOH): HOW HARD IS IT FOR YOU TO PAY FOR THE VERY BASICS LIKE FOOD, HOUSING, MEDICAL CARE, AND HEATING?: NOT HARD AT ALL

## 2021-05-27 NOTE — PROGRESS NOTES
TeleMedicine Patient Consent    This visit was performed as a virtual video visit using a synchronous, two-way, audio-video telehealth technology platform. Patient identification was verified at the start of the visit, including the patient's telephone number and physical location. I discussed with the patient the nature of our telehealth visits, that:     1. Due to the nature of an audio- video modality, the only components of a physical exam that could be done are the elements supported by direct observation. 2. I would evaluate the patient and recommend diagnostics and treatments based on my assessment. 3. If it was felt that the patient should be evaluated in clinic or an emergency room setting, then they would be directed there. 4. Our sessions are not being recorded and that personal health information is protected. 5. Our team would provide follow up care in person if/when the patient needs it. Patient does agree to proceed with telemedicine consultation. Patient's location: home address in [facility]  Physician  location home address in [facility] other people involved in call n/a    This visit was completed virtually using Doxy. me     Post-Discharge Transitional Care Management Services or Hospital Follow Up      Ko Rivera   YOB: 1985    Date of Office Visit:  5/27/2021  Date of Hospital Admission: 5/19/21  Date of Hospital Discharge: 5/22/21  Risk of hospital readmission (high >=14%.  Medium >=10%) :Readmission Risk Score: 21      Care management risk score Rising risk (score 2-5) and Complex Care (Scores >=6): 6     Non face to face  following discharge, date last encounter closed (first attempt may have been earlier): *No documented post hospital discharge outreach found in the last 14 days    Call initiated 2 business days of discharge: *No response recorded in the last 14 days    Patient Active Problem List   Diagnosis    ESRD (end stage renal disease) on dialysis ([facility] [address]  HTN (hypertension), benign    Uncontrolled type 1 diabetes mellitus with hyperglycemia (HCC)    Chronic deep vein thrombosis (DVT) of right upper extremity (HCC)    Facial cellulitis    Acute hyperkalemia    Nasal polyp    Periorbital cellulitis of right eye    Periorbital cellulitis    Vitamin B deficiency, unspecified    Abscess    Nasal congestion    Dry cough    Hidradenitis suppurativa    Symptomatic anemia    Anemia    Acute congestive heart failure (HCC)    ESRD needing dialysis (HCC)    Volume overload    Gall stone    Non-compliance    AMS (altered mental status)    Groin abscess       No Known Allergies    [facility] the [facility] [address] Medication Instructions REMI:    Printed on:06/16/21 2574   Medication Information                      albuterol sulfate  (90 Base) MCG/ACT inhaler  Inhale 2 puffs into the lungs every 4-6 hours as needed for Wheezing or Shortness of Breath             B Complex-C-Folic Acid (YOLANDA-JACOB) TABS  TAKE 1 TABLET BY MOUTH DAILY WITH SUPPER             bacitracin zinc 500 UNIT/GM ointment  Apply topically daily Apply topically 2 times daily.              carvedilol (COREG) 25 MG tablet  Take 50 mg by mouth every morning 2 tabs am and one tab pm             Cholecalciferol (VITAMIN D3) 50 MCG (2000 UT) CAPS  Take 2,000 Units by mouth daily             famotidine (PEPCID) 20 MG tablet  Take 1 tablet by mouth every morning             hydrALAZINE (APRESOLINE) 50 MG tablet  Take 1 tablet by mouth every 8 hours             Insulin Glargine, 2 Unit Dial, (TOUJEO MAX SOLOSTAR) 300 UNIT/ML SOPN  Inject 16 Units into the skin nightly              insulin lispro, 1 Unit Dial, (HUMALOG KWIKPEN) 100 UNIT/ML SOPN  Inject 10 Units into the skin 3 times daily (before meals) *Plus Sliding Scale*             medroxyPROGESTERone (DEPO-PROVERA) 150 MG/ML injection  Inject 150 mg into the muscle every 3 months                  Medications marked \"taking\" at this time  Outpatient Medications Marked as Taking for the 5/27/21 encounter (Office Visit) with Magnus Minaies, APRN - CNP   Medication Sig Dispense Refill    albuterol sulfate  (90 Base) MCG/ACT inhaler Inhale 2 puffs into the lungs every 4-6 hours as needed for Wheezing or Shortness of Breath 1 Inhaler 2    bacitracin zinc 500 UNIT/GM ointment Apply topically daily Apply topically 2 times daily. 30 g 2    B Complex-C-Folic Acid (YOLANDA-JACOB) TABS TAKE 1 TABLET BY MOUTH DAILY WITH SUPPER 30 tablet 5    hydrALAZINE (APRESOLINE) 50 MG tablet Take 1 tablet by mouth every 8 hours 90 tablet 3    Cholecalciferol (VITAMIN D3) 50 MCG (2000 UT) CAPS Take 2,000 Units by mouth daily      Insulin Glargine, 2 Unit Dial, (TOUJEO MAX SOLOSTAR) 300 UNIT/ML SOPN Inject 16 Units into the skin nightly       insulin lispro, 1 Unit Dial, (HUMALOG KWIKPEN) 100 UNIT/ML SOPN Inject 10 Units into the skin 3 times daily (before meals) *Plus Sliding Scale*      famotidine (PEPCID) 20 MG tablet Take 1 tablet by mouth every morning 30 tablet 3    medroxyPROGESTERone (DEPO-PROVERA) 150 MG/ML injection Inject 150 mg into the muscle every 3 months   3    carvedilol (COREG) 25 MG tablet Take 50 mg by mouth every morning 2 tabs am and one tab pm  5        Medications patient taking as of now reconciled against medications ordered at time of hospital discharge: Yes    Chief Complaint   Patient presents with    Follow-Up from Hospital     abscess Rt groin (doing much better)    Diabetes     home readings  (non fasting)      Home health visiting nurses for IV ATB, follows with ID, ESRD with IHD 3 days per week. Change in BP medication, pt stopped amlodipine, approved by consult in hospital, anemia was transfused & began VALENTIN. She has not heard from [address] [facility] does not want to prescribe the medication. Last drainage of wound was Tuesday & is improving. History of Present illness - Follow up of Hospital diagnosis(es): Right groin abscess, ESRD    Inpatient course: Discharge summary reviewed- see chart. Interval history/Current status: stable    Review of Systems   Constitutional: Negative for activity change, appetite change, chills and fever. Eyes: Negative for visual disturbance. Respiratory: Negative for cough, chest tightness, shortness of breath and wheezing. Cardiovascular: Positive for leg swelling. Negative for chest pain and palpitations. Gastrointestinal: Negative for constipation, diarrhea, nausea and vomiting. Endocrine: Negative for polydipsia, polyphagia and polyuria. Genitourinary: Negative for difficulty urinating. Musculoskeletal: Negative for arthralgias and myalgias. Skin: Positive for wound. Negative for color change and rash. Neurological: Negative for dizziness, light-headedness and headaches. Hematological: Negative for adenopathy. Does not bruise/bleed easily. Psychiatric/Behavioral: Negative for agitation, decreased concentration, sleep disturbance and suicidal ideas. The patient is not nervous/anxious. Physical Exam  Vitals reviewed: Pt was not able to provide vital signs for today's appointment, unless indicated in HPI. Constitutional:       General: She is not in acute distress. Appearance: Normal appearance. She is well-developed and normal weight. She is not ill-appearing, toxic-appearing or diaphoretic. HENT:      Head: Normocephalic and atraumatic. Right Ear: External ear normal.      Left Ear: External ear normal.      Nose: Nose normal.      Mouth/Throat:      Mouth: Mucous membranes are moist.      Pharynx: Oropharynx is clear. Eyes:      Extraocular Movements: Extraocular movements intact. Conjunctiva/sclera: Conjunctivae normal.      Pupils: Pupils are equal, round, and reactive to light. Neck:      Thyroid: No thyromegaly. Vascular: No JVD.    Pulmonary:      Effort: Pulmonary effort is normal. No respiratory distress. Breath sounds: Normal breath sounds. No wheezing. Comments: No cough noted during examination. Chest:      Chest wall: No tenderness. Abdominal:      General: There is no distension. Palpations: Abdomen is soft. Tenderness: There is no abdominal tenderness. Musculoskeletal:         General: No swelling or deformity. Normal range of motion. Cervical back: Normal range of motion. Right lower leg: No edema. Left lower leg: No edema. Skin:     General: Skin is warm and dry. Capillary Refill: Capillary refill takes less than 2 seconds. Findings: No bruising, erythema or rash. Comments: Patient was unable to provide access to wound via video. Neurological:      General: No focal deficit present. Mental Status: She is alert and oriented to person, place, and time. Mental status is at baseline. Cranial Nerves: No cranial nerve deficit. Sensory: No sensory deficit. Motor: No weakness or abnormal muscle tone. Coordination: Coordination normal.      Gait: Gait normal.      Deep Tendon Reflexes: Reflexes normal.   Psychiatric:         Mood and Affect: Mood normal.         Behavior: Behavior normal.         Thought Content: Thought content normal.         Judgment: Judgment normal.       Vitals:    05/27/21 1427   Temp: 98.3 °F (36.8 °C)   Weight: 137 lb (62.1 kg)   Height: 4' 11\" (1.499 m)     Body mass index is 27.67 kg/m². Wt Readings from Last 3 Encounters:   05/27/21 137 lb (62.1 kg)   05/21/21 161 lb 13.1 oz (73.4 kg)   02/02/21 146 lb 2.6 oz (66.3 kg)     BP Readings from Last 3 Encounters:   05/22/21 (!) 140/67   02/02/21 (!) 158/78   01/29/21 (!) 149/84      Assessment/Plan:  1.  Hospital discharge follow-up    - ID DISCHARGE MEDS RECONCILED W/ CURRENT OUTPATIENT MED LIST  - PT is having trouble obtaining IV medication at [facility], will reach out to case management for verification of such    2. SOB (shortness of breath)    - albuterol sulfate  (90 Base) MCG/ACT inhaler; Inhale 2 puffs into the lungs every 4-6 hours as needed for Wheezing or Shortness of Breath  Dispense: 1 Inhaler;  Refill: 2        Medical Decision Making: moderate complexity

## 2021-06-05 NOTE — DISCHARGE SUMMARY
Hospital Medicine Discharge Summary    Patient ID: Kathrin Arroyo      Patient's PCP: Boni Parada, KEMI - CNP    Admit Date: 5/19/2021     Discharge Date: 5/22/2021      Admitting Physician: Jennifer Flores MD     Discharge Physician: Jennifer Flores MD     Discharge Diagnoses: Active Hospital Problems    Diagnosis Date Noted    Groin abscess [L02.214] 05/19/2021       The patient was seen and examined on day of discharge and this discharge summary is in conjunction with any daily progress note from day of discharge. Hospital Course:   Patient admitted on 5/19/2021 for right thigh abscess s/p I&D. With hx of ESRD for which nephrology is consulted. ID consulted and recommended IV vancomycin and cefepime for 10 days with HD. Discharged to home [facility] on 5/22/21 in stable condition with pcp follow up             Exam:     BP (!) 140/67   Pulse 98   Temp 98.6 °F (37 °C) (Temporal)   Resp 18   Ht 4' 11\" (1.499 m)   Wt 161 lb 13.1 oz (73.4 kg)   SpO2 93%   BMI 32.68 kg/m²     General appearance: No distress, appears stated age and cooperative. HEENT: Normal cephalic, atraumatic without obvious deformity. Pupils equal, round, and reactive to light.  Facial edema noted. Neck: Supple, with full range of motion. No jugular venous distention. Trachea midline. Respiratory:  Clear to auscultation bilaterally.  No apparent distress. Cardiovascular:  Regular rate and rhythm. S1, S2 without murmurs, rubs, or gallops. PV: Brisk capillary refill.  +2 pedal and radial pulses bilaterally. + edema BLE; BISHNU AVF with + bruit, thrill  Abdomen: Soft, non-tender, non-distended. +BS  Musculoskeletal: No obvious deformities or erythematous or edematous joints. Skin: Normal skin color. Erythema, gauze overlying R groin   Neurologic:  Neurovascularly intact without any focal sensory/motor deficits.   Psychiatric: Alert and oriented, thought content appropriate, normal insight    Consults:     IP CONSULT TO GENERAL SURGERY  IP CONSULT TO HOSPITALIST  IP CONSULT TO NEPHROLOGY  IP CONSULT TO INFECTIOUS DISEASES  PHARMACY TO DOSE VANCOMYCIN    Significant Diagnostic Studies:     CT ABDOMEN PELVIS W IV CONTRAST Additional Contrast? None   Final Result   1. 6.1 x 1.8 cm mildly rim-enhancing subcutaneous fluid collection in the   proximal medial thigh may represent an abscess. No extension to the labia,   perineum or the pelvis. 2. Third-spacing of fluid, including interstitial and alveolar edema, small   bilateral pleural effusions, small to moderate volume ascites and moderate   anasarca. 3. Inhomogeneous enhancement of the liver is nonspecific and may signify   congestive hepatopathy. 4. Cholelithiasis. 5. Bilaterally atrophic kidneys. 6. Mildly increased sclerosis in the bones may represent renal osteodystrophy. Disposition:   Home w [facility]   Discharge Instructions/Follow-up:  Keep scheduled follow up appointments. Take medications as prescribed. Code Status:  Prior     Activity: activity as tolerated    Diet: diabetic diet    Labs:  For convenience and continuity at follow-up the following most recent labs are provided:      CBC:    Lab Results   Component Value Date    WBC 5.9 05/22/2021    HGB 8.5 05/22/2021    HCT 25.4 05/22/2021     05/22/2021       Renal:    Lab Results   Component Value Date     05/22/2021    K 3.9 05/22/2021    K 3.9 05/20/2021    CL 94 05/22/2021    CO2 34 05/22/2021    BUN 9 05/22/2021    CREATININE 3.7 05/22/2021    CALCIUM 9.0 05/22/2021    PHOS 6.6 02/01/2021       Discharge Medications:     Discharge Medication List as of 5/22/2021  1:45 PM           Details   ceFEPIme (MAXIPIME) 2 g injection Infuse 2,000 mg intravenously three times a week for 10 days Cefepime 2 grams IV q HD for 10 days, Disp-92977 mg, R-0Print      vancomycin (VANCOCIN) infusion Infuse 1,000 mg intravenously three times a week for 10 days Vancomycin 1 gram IV q HD for  10 days, Disp-5 g, R-0Print              Details   !! carvedilol (COREG) 25 MG tablet Take 25 mg by mouth nightlyHistorical Med      B Complex-C-Folic Acid (YOLANDA-JACOB) TABS TAKE 1 TABLET BY MOUTH DAILY WITH SUPPER, Disp-30 tablet, R-5Normal      hydrALAZINE (APRESOLINE) 50 MG tablet Take 1 tablet by mouth every 8 hours, Disp-90 tablet, R-3Normal      Cholecalciferol (VITAMIN D3) 50 MCG (2000 UT) CAPS Take 2,000 Units by mouth dailyHistorical Med      Insulin Glargine, 2 Unit Dial, (TOUJEO MAX SOLOSTAR) 300 UNIT/ML SOPN Inject 16 Units into the skin nightly Historical Med      insulin lispro, 1 Unit Dial, (HUMALOG KWIKPEN) 100 UNIT/ML SOPN Inject 10 Units into the skin 3 times daily (before meals) *Plus Sliding Scale*Historical Med      famotidine (PEPCID) 20 MG tablet Take 1 tablet by mouth every morning, Disp-30 tablet, R-3Normal      medroxyPROGESTERone (DEPO-PROVERA) 150 MG/ML injection Inject 150 mg into the muscle every 3 months , R-3Historical Med      albuterol sulfate  (90 Base) MCG/ACT inhaler Inhale 2 puffs into the lungs every 4-6 hours as needed for Wheezing or Shortness of Breath, Disp-1 Inhaler, R-0Normal      !! carvedilol (COREG) 25 MG tablet Take 50 mg by mouth every morning , R-5Historical Med       !! - Potential duplicate medications found. Please discuss with provider. Time Spent on discharge is more than 45 minutes in the examination, evaluation, counseling and review of medications and discharge plan.       Signed:    Anahi Ng MD   6/5/2021

## 2021-06-30 NOTE — PROGRESS NOTES
Physician Progress Note      PATIENT:               Shakira Ruiz  CSN #:                  [id number]  :                       1985  ADMIT DATE:       2021 12:47 PM  [address] [place] DATE:        2021 2:36 PM  RESPONDING  PROVIDER #:        Sheila Tang MD          QUERY TEXT:    Patient admitted with abscess left groin. Per Op note dated  documentation   of I&D, \"An incision was then made over the apex of the lesion and   approximately 5 cc of purulent material was expressed. Loculations were broken   up using forceps and more of the material was able to be expressed. \"    To accurately reflect the procedure performed please further specify the depth   of tissue incised and drained: The medical record reflects the following:  Risk Factors: BMI 35 noncompliance  ESRD  Clinical Indicators: per op note stated above  Treatment: I&D left groin abscess surgical consult    Griselda Darnel RN BSN CCDS  Options provided:  -- Skin only  -- Subcutaneous tissue  -- Fascia  -- Muscle  -- Joint  -- Bone  -- Other - I will add my own diagnosis  -- Disagree - Not applicable / Not valid  -- Disagree - Clinically unable to determine / Unknown  -- Refer to Clinical Documentation Reviewer    PROVIDER RESPONSE TEXT:    Addendum to  (date) procedure note The depth of the drainage to left groin   (site) was down to and including subcutaneous tissue.     Query created by: Herminia Seaman on 2021 2:37 PM      Electronically signed by:  Sheila Tang MD 2021 12:45 PM

## 2021-07-10 ENCOUNTER — PATIENT MESSAGE (OUTPATIENT)
Dept: FAMILY MEDICINE CLINIC | Age: 36
End: 2021-07-10

## 2021-07-10 DIAGNOSIS — R09.81 NASAL CONGESTION: Primary | ICD-10-CM

## 2021-07-10 DIAGNOSIS — R05.8 DRY COUGH: ICD-10-CM

## 2021-07-11 RX ORDER — BENZONATATE 200 MG/1
200 CAPSULE ORAL 3 TIMES DAILY PRN
Qty: 15 CAPSULE | Refills: 2 | Status: SHIPPED
Start: 2021-07-11 | End: 2021-09-21 | Stop reason: ALTCHOICE

## 2021-07-11 RX ORDER — BROMPHENIRAMINE MALEATE, PSEUDOEPHEDRINE HYDROCHLORIDE, AND DEXTROMETHORPHAN HYDROBROMIDE 2; 30; 10 MG/5ML; MG/5ML; MG/5ML
10 SYRUP ORAL 4 TIMES DAILY PRN
Qty: 473 ML | Refills: 0 | Status: SHIPPED
Start: 2021-07-11 | End: 2021-08-17 | Stop reason: SDUPTHER

## 2021-07-12 NOTE — TELEPHONE ENCOUNTER
From: Kris Walters  To: KEMI Brandon CNP  Sent: 7/10/2021 10:32 PM EDT  Subject: Prescription Question    Hello I wanted to know if you could order and refill my bromphen medicine my sinuses are thee worst right I'm coughing my lungs eyes and nose off nose running eyes watering and chest feeling congested snd I have no medicine at home that maybe able to help with this situation

## 2021-08-17 ENCOUNTER — OFFICE VISIT (OUTPATIENT)
Dept: FAMILY MEDICINE CLINIC | Age: 36
End: 2021-08-17
Payer: MEDICARE

## 2021-08-17 VITALS
OXYGEN SATURATION: 98 % | BODY MASS INDEX: 28.63 KG/M2 | RESPIRATION RATE: 18 BRPM | TEMPERATURE: 97.2 F | SYSTOLIC BLOOD PRESSURE: 110 MMHG | DIASTOLIC BLOOD PRESSURE: 54 MMHG | WEIGHT: 142 LBS | HEIGHT: 59 IN | HEART RATE: 90 BPM

## 2021-08-17 DIAGNOSIS — N18.6 ESRD (END STAGE RENAL DISEASE) ON DIALYSIS (HCC): Primary | ICD-10-CM

## 2021-08-17 DIAGNOSIS — R05.8 DRY COUGH: ICD-10-CM

## 2021-08-17 DIAGNOSIS — E10.65 UNCONTROLLED TYPE 1 DIABETES MELLITUS WITH HYPERGLYCEMIA (HCC): ICD-10-CM

## 2021-08-17 DIAGNOSIS — Z99.2 ESRD (END STAGE RENAL DISEASE) ON DIALYSIS (HCC): Primary | ICD-10-CM

## 2021-08-17 DIAGNOSIS — R06.02 SOB (SHORTNESS OF BREATH): ICD-10-CM

## 2021-08-17 DIAGNOSIS — B07.9 VERRUCA VULGARIS: ICD-10-CM

## 2021-08-17 DIAGNOSIS — R09.81 NASAL CONGESTION: ICD-10-CM

## 2021-08-17 LAB — HBA1C MFR BLD: 8.8 %

## 2021-08-17 PROCEDURE — 3052F HG A1C>EQUAL 8.0%<EQUAL 9.0%: CPT | Performed by: NURSE PRACTITIONER

## 2021-08-17 PROCEDURE — 17110 DESTRUCTION B9 LES UP TO 14: CPT | Performed by: NURSE PRACTITIONER

## 2021-08-17 PROCEDURE — 99214 OFFICE O/P EST MOD 30 MIN: CPT | Performed by: NURSE PRACTITIONER

## 2021-08-17 PROCEDURE — 2022F DILAT RTA XM EVC RTNOPTHY: CPT | Performed by: NURSE PRACTITIONER

## 2021-08-17 PROCEDURE — G8427 DOCREV CUR MEDS BY ELIG CLIN: HCPCS | Performed by: NURSE PRACTITIONER

## 2021-08-17 PROCEDURE — G8419 CALC BMI OUT NRM PARAM NOF/U: HCPCS | Performed by: NURSE PRACTITIONER

## 2021-08-17 PROCEDURE — 83036 HEMOGLOBIN GLYCOSYLATED A1C: CPT | Performed by: NURSE PRACTITIONER

## 2021-08-17 PROCEDURE — 4004F PT TOBACCO SCREEN RCVD TLK: CPT | Performed by: NURSE PRACTITIONER

## 2021-08-17 RX ORDER — BROMPHENIRAMINE MALEATE, PSEUDOEPHEDRINE HYDROCHLORIDE, AND DEXTROMETHORPHAN HYDROBROMIDE 2; 30; 10 MG/5ML; MG/5ML; MG/5ML
10 SYRUP ORAL 4 TIMES DAILY PRN
Qty: 473 ML | Refills: 2 | Status: SHIPPED
Start: 2021-08-17 | End: 2022-10-27 | Stop reason: SDUPTHER

## 2021-08-17 RX ORDER — ALBUTEROL SULFATE 90 UG/1
2 AEROSOL, METERED RESPIRATORY (INHALATION)
Qty: 1 INHALER | Refills: 2 | Status: SHIPPED | OUTPATIENT
Start: 2021-08-17

## 2021-08-18 ASSESSMENT — ENCOUNTER SYMPTOMS
CONSTIPATION: 0
VOMITING: 0
CHEST TIGHTNESS: 0
COLOR CHANGE: 0
SHORTNESS OF BREATH: 0
DIARRHEA: 0
NAUSEA: 0
WHEEZING: 0
SINUS PAIN: 0
COUGH: 1
EYE PAIN: 0

## 2021-08-18 NOTE — PROGRESS NOTES
2021    Kasia Chaudhry (:  1985) is a 39 y.o. female, here for a     Chief Complaint   Patient presents with    Mole     on elbow      ASSESSMENT/PLAN:    1. ESRD (end stage renal disease) on dialysis ([facility]  -     POCT glycosylated hemoglobin (Hb A1C)  - Improved & stable    2. Uncontrolled type 1 diabetes mellitus with hyperglycemia (HCC)  -     POCT glycosylated hemoglobin (Hb A1C)  - improved    3. Nasal congestion  -     brompheniramine-pseudoephedrine-DM (BROMFED DM) 2-30-10 MG/5ML syrup; Take 10 mLs by mouth 4 times daily as needed for Congestion or Cough, Disp-473 mL, R-2Normal    4. Dry cough  -     brompheniramine-pseudoephedrine-DM (BROMFED DM) 2-30-10 MG/5ML syrup; Take 10 mLs by mouth 4 times daily as needed for Congestion or Cough, Disp-473 mL, R-2Normal    5. SOB (shortness of breath)  -     albuterol sulfate  (90 Base) MCG/ACT inhaler; Inhale 2 puffs into the lungs every 4-6 hours as needed for Wheezing or Shortness of Breath, Disp-1 Inhaler, R-2Normal    6. Verruca vulgaris  - Wart removal in office  - Dressing applied    [facility]   Being evaluated/managed by nephrology. No acute findings today meriting change in management plan.     Health Maintenance   Topic Date Due    Varicella vaccine (1 of 2 - 2-dose childhood series) Never done    Diabetic foot exam  Never done    Hepatitis B vaccine (1 of 3 - Risk Recombivax 3-dose series) 2003    Cervical cancer screen  2014    Annual Wellness Visit (AWV)  Never done    Diabetic retinal exam  2020    Flu vaccine (1) 2021    Lipid screen  2022    Potassium monitoring  2022    Creatinine monitoring  2022    A1C test (Diabetic or Prediabetic)  2022    Pneumococcal 0-64 years Vaccine (3 of 4 - PPSV23) 2023    DTaP/Tdap/Td vaccine (2 - Td or Tdap) 2028    COVID-19 Vaccine  Completed    Hepatitis C screen  Completed    HIV screen  Completed    Hepatitis A vaccine  Aged Out    Hib vaccine  Aged Out    Meningococcal (ACWY) vaccine  Aged Out     - Health Maintenance reviewed today & counseled patient as appropriate. Patient Active Problem List   Diagnosis    ESRD (end stage renal disease) on dialysis ([facility]    HTN (hypertension), benign    Uncontrolled type 1 diabetes mellitus with hyperglycemia (HCC)    Chronic deep vein thrombosis (DVT) of right upper extremity (HCC)    Facial cellulitis    Acute hyperkalemia    Nasal polyp    Periorbital cellulitis of right eye    Periorbital cellulitis    Vitamin B deficiency, unspecified    Abscess    Nasal congestion    Dry cough    Hidradenitis suppurativa    Symptomatic anemia    Anemia    Acute congestive heart failure (HCC)    ESRD needing dialysis (HCC)    Volume overload    Gall stone    Non-compliance    AMS (altered mental status)    Groin abscess       Vitals:    08/17/21 1534   BP: (!) 110/54   Pulse: 90   Resp: 18   Temp: 97.2 °F (36.2 °C)   TempSrc: Tympanic   SpO2: 98%   Weight: 142 lb (64.4 kg)   Height: 4' 11\" (1.499 m)     Estimated body mass index is 28.68 kg/m² as calculated from the following:    Height as of this encounter: 4' 11\" (1.499 m). Weight as of this encounter: 142 lb (64.4 kg). Review of Systems   Constitutional: Negative for activity change, appetite change, chills and fever. HENT: Negative for congestion, ear pain, sinus pain and sneezing. Eyes: Negative for pain and visual disturbance. Respiratory: Positive for cough. Negative for chest tightness, shortness of breath and wheezing. Cardiovascular: Negative for chest pain, palpitations and leg swelling. Gastrointestinal: Negative for constipation, diarrhea, nausea and vomiting. Endocrine: Negative for cold intolerance, heat intolerance and polyuria. Genitourinary: Negative for difficulty urinating. Musculoskeletal: Negative for arthralgias and myalgias. Skin: Negative for color change and rash. Lesion x1, right elbow     Neurological: Negative for dizziness, light-headedness and headaches. Hematological: Negative for adenopathy. Does not bruise/bleed easily. Psychiatric/Behavioral: Negative for agitation, decreased concentration, sleep disturbance and suicidal ideas. The patient is not nervous/anxious. Physical Exam  Vitals and nursing note reviewed. Constitutional:       General: She is not in acute distress. Appearance: Normal appearance. She is well-developed and normal weight. She is not ill-appearing, toxic-appearing or diaphoretic. HENT:      Head: Normocephalic and atraumatic. Right Ear: Tympanic membrane, ear canal and external ear normal. There is no impacted cerumen. Left Ear: Tympanic membrane, ear canal and external ear normal. There is no impacted cerumen. Ears:      Comments: ENT: canals clear, TMs intact and pearly gray, nasal turbinates erythematous and boggy, pharynx shows erythema and post nasal drip       Nose: No congestion. Mouth/Throat:      Mouth: Mucous membranes are moist.      Pharynx: Oropharynx is clear. No oropharyngeal exudate or posterior oropharyngeal erythema. Eyes:      Extraocular Movements: Extraocular movements intact. Conjunctiva/sclera: Conjunctivae normal.      Pupils: Pupils are equal, round, and reactive to light. Neck:      Thyroid: No thyromegaly. Cardiovascular:      Rate and Rhythm: Normal rate and regular rhythm. Pulses: Normal pulses. Heart sounds: Normal heart sounds. No murmur heard. Pulmonary:      Effort: Pulmonary effort is normal. No respiratory distress. Breath sounds: Normal breath sounds. No wheezing. Abdominal:      General: Bowel sounds are normal. There is no distension. Palpations: Abdomen is soft. Tenderness: There is no abdominal tenderness. There is no guarding or rebound. Genitourinary:     Vagina: Normal.      Comments: Deferred.   Musculoskeletal: General: Normal range of motion. Cervical back: Normal range of motion and neck supple. Lymphadenopathy:      Cervical: No cervical adenopathy. Skin:     General: Skin is warm and dry. Capillary Refill: Capillary refill takes less than 2 seconds. Findings: Lesion present. No bruising, erythema or rash. Neurological:      General: No focal deficit present. Mental Status: She is alert and oriented to person, place, and time. Mental status is at baseline. Cranial Nerves: No cranial nerve deficit. Sensory: No sensory deficit. Motor: No weakness. Coordination: Coordination normal.      Gait: Gait normal.   Psychiatric:         Mood and Affect: Mood normal.         Behavior: Behavior normal.         Thought Content: Thought content normal.         Judgment: Judgment normal.         Prior to Visit Medications    Medication Sig Taking? Authorizing Provider   brompheniramine-pseudoephedrine-DM (BROMFED DM) 2-30-10 MG/5ML syrup Take 10 mLs by mouth 4 times daily as needed for Congestion or Cough Yes Keyla Broccoli, APRN - CNP   albuterol sulfate  (90 Base) MCG/ACT inhaler Inhale 2 puffs into the lungs every 4-6 hours as needed for Wheezing or Shortness of Breath Yes Keyla Broccoli, APRN - CNP   benzonatate (TESSALON) 200 MG capsule Take 1 capsule by mouth 3 times daily as needed for Cough Yes Keyla Broccoli, APRN - CNP   bacitracin zinc 500 UNIT/GM ointment Apply topically daily Apply topically 2 times daily.  Yes Keyla Broccoli, APRN - CNP   B Complex-C-Folic Acid (YOLANDA-JACOB) TABS TAKE 1 TABLET BY MOUTH DAILY WITH SUPPER Yes Keyla Broccoli, APRN - CNP   hydrALAZINE (APRESOLINE) 50 MG tablet Take 1 tablet by mouth every 8 hours Yes Estephanie Bowens,    Cholecalciferol (VITAMIN D3) 50 MCG (2000 UT) CAPS Take 2,000 Units by mouth daily Yes Historical Provider, MD   Insulin Glargine, 2 Unit Dial, (TOUJEO MAX SOLOSTAR) 300 UNIT/ML SOPN Inject 16 Units into the skin nightly  Yes Historical Provider, MD   insulin lispro, 1 Unit Dial, (HUMALOG KWIKPEN) 100 UNIT/ML SOPN Inject 10 Units into the skin 3 times daily (before meals) *Plus Sliding Scale* Yes Historical Provider, MD   famotidine (PEPCID) 20 MG tablet Take 1 tablet by mouth every morning Yes KEMI Barker CNP   medroxyPROGESTERone (DEPO-PROVERA) 150 MG/ML injection Inject 150 mg into the muscle every 3 months  Yes Historical Provider, MD   carvedilol (COREG) 25 MG tablet Take 50 mg by mouth every morning 2 tabs am and one tab pm Yes Historical Provider, MD        No Known Allergies    ADVANCE DIRECTIVE: N, <no information>    Immunization History   Administered Date(s) Administered    COVID-19, Moderna, PF, 100mcg/0.5mL 03/17/2021, 04/14/2021    Tdap (Boostrix, Adacel) 03/30/2018       No follow-ups on file. An electronic signature was used to authenticate this note.     --KEMI Barker CNP on 8/18/2021 at 2:56 PM

## 2021-08-31 RX ORDER — DIAPER,BRIEF,INFANT-TODD,DISP
EACH MISCELLANEOUS DAILY
Qty: 30 G | Refills: 2 | Status: SHIPPED
Start: 2021-08-31 | End: 2022-01-25 | Stop reason: SDUPTHER

## 2021-08-31 NOTE — TELEPHONE ENCOUNTER
Last Appointment:  8/17/2021  Future Appointments   Date Time Provider Titus Potter   9/21/2021 11:30 AM Stewart Mcclendon  [address]

## 2021-09-10 RX ORDER — ACETAMINOPHEN 160 MG
2000 TABLET,DISINTEGRATING ORAL DAILY
Qty: 30 CAPSULE | Refills: 1 | Status: SHIPPED | OUTPATIENT
Start: 2021-09-10

## 2021-09-10 NOTE — TELEPHONE ENCOUNTER
Last Appointment:  8/17/2021  Future Appointments   Date Time Provider Titus Potter   9/21/2021 11:30 AM Brittany Parr  [address]

## 2021-09-21 ENCOUNTER — OFFICE VISIT (OUTPATIENT)
Dept: FAMILY MEDICINE CLINIC | Age: 36
End: 2021-09-21
Payer: MEDICARE

## 2021-09-21 VITALS
HEIGHT: 59 IN | WEIGHT: 146 LBS | OXYGEN SATURATION: 96 % | TEMPERATURE: 98 F | HEART RATE: 97 BPM | SYSTOLIC BLOOD PRESSURE: 100 MMHG | BODY MASS INDEX: 29.43 KG/M2 | DIASTOLIC BLOOD PRESSURE: 56 MMHG

## 2021-09-21 DIAGNOSIS — N18.6 ESRD (END STAGE RENAL DISEASE) ON DIALYSIS (HCC): Primary | Chronic | ICD-10-CM

## 2021-09-21 DIAGNOSIS — T65.224A TOXIC EFFECT OF TOBACCO CIGARETTES, UNDETERMINED, INITIAL ENCOUNTER: ICD-10-CM

## 2021-09-21 DIAGNOSIS — E10.65 UNCONTROLLED TYPE 1 DIABETES MELLITUS WITH HYPERGLYCEMIA (HCC): ICD-10-CM

## 2021-09-21 DIAGNOSIS — R05.8 COUGH PRODUCTIVE OF CLEAR SPUTUM: ICD-10-CM

## 2021-09-21 DIAGNOSIS — I10 HTN (HYPERTENSION), BENIGN: Chronic | ICD-10-CM

## 2021-09-21 DIAGNOSIS — Z72.0 TOBACCO ABUSE: ICD-10-CM

## 2021-09-21 DIAGNOSIS — F17.200 TOBACCO DEPENDENCE: ICD-10-CM

## 2021-09-21 DIAGNOSIS — Z99.2 ESRD (END STAGE RENAL DISEASE) ON DIALYSIS (HCC): Primary | Chronic | ICD-10-CM

## 2021-09-21 PROCEDURE — 2022F DILAT RTA XM EVC RTNOPTHY: CPT | Performed by: INTERNAL MEDICINE

## 2021-09-21 PROCEDURE — 3052F HG A1C>EQUAL 8.0%<EQUAL 9.0%: CPT | Performed by: INTERNAL MEDICINE

## 2021-09-21 PROCEDURE — 99214 OFFICE O/P EST MOD 30 MIN: CPT | Performed by: INTERNAL MEDICINE

## 2021-09-21 PROCEDURE — 99406 BEHAV CHNG SMOKING 3-10 MIN: CPT | Performed by: INTERNAL MEDICINE

## 2021-09-21 PROCEDURE — 4004F PT TOBACCO SCREEN RCVD TLK: CPT | Performed by: INTERNAL MEDICINE

## 2021-09-21 PROCEDURE — G8419 CALC BMI OUT NRM PARAM NOF/U: HCPCS | Performed by: INTERNAL MEDICINE

## 2021-09-21 PROCEDURE — G8427 DOCREV CUR MEDS BY ELIG CLIN: HCPCS | Performed by: INTERNAL MEDICINE

## 2021-09-21 RX ORDER — CYANOCOBALAMIN (VITAMIN B-12) 500 MCG
TABLET ORAL
COMMUNITY

## 2021-09-21 RX ORDER — BUPROPION HYDROCHLORIDE 100 MG/1
100 TABLET, EXTENDED RELEASE ORAL
Qty: 15 TABLET | Refills: 5 | Status: SHIPPED | OUTPATIENT
Start: 2021-09-21 | End: 2022-03-20

## 2021-09-21 RX ORDER — GUAIFENESIN AND CODEINE PHOSPHATE 100; 10 MG/5ML; MG/5ML
10 SOLUTION ORAL 2 TIMES DAILY PRN
Qty: 180 ML | Refills: 0 | Status: SHIPPED
Start: 2021-09-21 | End: 2022-08-29 | Stop reason: SDUPTHER

## 2021-09-21 NOTE — PROGRESS NOTES
 DM type 1 (diabetes mellitus, type 1) ([facility]     Encounter regarding vascular access for dialysis for ESRD ([facility] 2018    ESRD (end stage renal disease) ([facility]     Hemodialysis patient ([facility]     amrita dawson  fri / graft in left arm    Hypertension     Tobacco abuse 2016       Surgical History   Past Surgical History:   Procedure Laterality Date     SECTION      CYST INCISION AND DRAINAGE  2011    perirectal abscess. [facility].  Dr. Mitchell Valentine Left 2019    LEFT ARM FISTULAGRAM, BALLOON ANGIOPLASTY performed by Hernandez Barr MD at [facility]      FRACTURE SURGERY      fracture right ulnar, left tibia, and pelvis    OTHER SURGICAL HISTORY      open reduction internal fixation left radial shaft    OTHER SURGICAL HISTORY  2016    pericardial window    OTHER SURGICAL HISTORY  2016     r tesio insertion  / remove 2018    OTHER SURGICAL HISTORY Left 2017    insertion a-v fistula left arm    [place] ANASTOMOSIS,AV,ANY SITE Left 2018    REVISION AV FISTULA - LEFT ARM performed by Hernandez Barr MD at [facility] NON-TUNNEL CV CATH N/A 5/10/2018    TESIO CATHETER INSERTION performed by Schuyler Rosas MD at [address] PRERETINAL MEMBRANE Left 2018    PARS PLANA VITRECTOMY 25 GAUGE, VITREOUS HEMORRHAGE REMOVAL, ENDO LASER, AIR/FLUID  EXCHANGE LEFT EYE performed by Raina Craven MD at [address] N/A 2021    PERINEAL ABCESS INCISION AND DRAINAGE (LITHOTOMY) performed by Kelly Yates MD at [facility] History  Family History   Problem Relation Age of Onset    Stroke Mother     Cancer Father     Diabetes Paternal Aunt        Social History  Social History     Tobacco Use    Smoking status: Light Tobacco Smoker     Years: 15.00     Types: Cigarettes    Smokeless tobacco: Never Used    Tobacco comment: [facility]   Substance Use Topics    Alcohol use: Not Currently     Alcohol/week: 0.0 standard drinks       Home Medications  Current Outpatient Medications   Medication Sig Dispense Refill    Probiotic Product (PROBIOTIC ADVANCED PO) Take by mouth      Folic Acid 0.8 MG CAPS Take by mouth      guaiFENesin-codeine (TUSSI-ORGANIDIN NR) 100-10 MG/5ML syrup Take 10 mLs by mouth 2 times daily as needed for Cough or Congestion for up to 14 days. 180 mL 0    buPROPion (WELLBUTRIN SR) 100 MG extended release tablet Take 1 tablet by mouth every 48 hours 15 tablet 5    Cholecalciferol (VITAMIN D3) 50 MCG (2000 UT) CAPS Take 1 capsule by mouth daily 30 capsule 1    bacitracin zinc 500 UNIT/GM ointment Apply topically daily Apply topically 2 times daily. 30 g 2    brompheniramine-pseudoephedrine-DM (BROMFED DM) 2-30-10 MG/5ML syrup Take 10 mLs by mouth 4 times daily as needed for Congestion or Cough 473 mL 2    albuterol sulfate  (90 Base) MCG/ACT inhaler Inhale 2 puffs into the lungs every 4-6 hours as needed for Wheezing or Shortness of Breath 1 Inhaler 2    B Complex-C-Folic Acid (YOLANDA-JACOB) TABS TAKE 1 TABLET BY MOUTH DAILY WITH SUPPER 30 tablet 5    hydrALAZINE (APRESOLINE) 50 MG tablet Take 1 tablet by mouth every 8 hours 90 tablet 3    Insulin Glargine, 2 Unit Dial, (TOUJEO MAX SOLOSTAR) 300 UNIT/ML SOPN Inject 16 Units into the skin nightly       insulin lispro, 1 Unit Dial, (HUMALOG KWIKPEN) 100 UNIT/ML SOPN Inject 10 Units into the skin 3 times daily (before meals) *Plus Sliding Scale*      famotidine (PEPCID) 20 MG tablet Take 1 tablet by mouth every morning 30 tablet 3    medroxyPROGESTERone (DEPO-PROVERA) 150 MG/ML injection Inject 150 mg into the muscle every 3 months   3    carvedilol (COREG) 25 MG tablet Take 50 mg by mouth every morning 2 tabs am and one tab pm  5     No current facility-administered medications for this visit.         Allergies  No Known Allergies    Objective  Vitals:    09/21/21 1126   BP: (!) 100/56   Site: Right Upper Arm   Pulse: 97   Temp: 98 °F (36.7 °C)   TempSrc: Temporal   SpO2: 96%   Weight: 146 lb (66.2 kg)   Height: 4' 11\" (1.499 m)        Physical Exam:  General: Awake, alert, and oriented to person, place, time, and purpose, appears stated age and cooperative, No acute distress  Head: Normocephalic, atraumatic  Eyes: conjunctivae/corneas clear, EOM's intact. Mouth: Mucous membranes moist with no pharyngeal exudate or erythema  Neck: no JVD, no adenopathy, no carotid bruit, supple, symmetrical, trachea midline  Back: symmetric, ROM normal, No CVA tenderness. Lungs: clear to auscultation bilaterally without wheezes, rales, or rhonchi  Heart: regular rate and rhythm, S1, S2 normal, no murmur, click, rub or gallop  Chest: Tesio catheter in her right chest wall  Abdomen: soft, non-tender; bowel sounds normal; no masses,  no organomegaly  Extremities: atraumatic, no cyanosis, no edema, 2+ pulses palpated in all 4 extremities  Skin: color, texture, turgor within normal limits.  No rashes or lesions or normal  Neurologic: speech appropriate, moves all 4 extremities, normal muscle strength and tone, CN 2-12 grossly intact    Labs  Lab Results   Component Value Date    WBC 5.9 05/22/2021    HGB 8.5 (L) 05/22/2021    HCT 25.4 (L) 05/22/2021     05/22/2021     05/22/2021    K 3.9 05/22/2021    CL 94 (L) 05/22/2021    CREATININE 3.7 (H) 05/22/2021    BUN 9 05/22/2021    CO2 34 (H) 05/22/2021    GLUCOSE 234 (H) 05/22/2021    ALT 32 05/19/2021    AST 34 (H) 05/19/2021    INR 1.1 11/26/2018     Lab Results   Component Value Date    TSH 3.850 01/27/2021     Lab Results   Component Value Date    TRIG 68 10/08/2013     Lab Results   Component Value Date    HDL 40 01/29/2021    HDL 50.0 10/08/2013     Lab Results   Component Value Date    LDLCALC 41 01/29/2021    [address] 10/08/2013     Lab Results   Component Value Date    LABA1C 8.8 08/17/2021     Lab Results   Component Value Date    INR 1.1 11/26/2018    INR 1.0 07/17/2018    INR 1.0 02/17/2017    PROTIME 12.2 11/26/2018    PROTIME 11.8 07/17/2018    PROTIME 10.6 02/17/2017      *All recent labs were reviewed. Please see electronic chart for a more comprehensive set of labs    Radiology  No results found. Health Maintenance Due   Topic Date Due    Varicella vaccine (1 of 2 - 2-dose childhood series) Never done    Diabetic foot exam  Never done    Hepatitis B vaccine (1 of 3 - Risk Recombivax 3-dose series) 06/19/2003    Cervical cancer screen  09/13/2014    Annual Wellness Visit (AWV)  Never done    Diabetic retinal exam  03/07/2020    Flu vaccine (1) 09/01/2021         Assessment and Plan  Rajnipati Katie presents today to establish care. Ashley Saldana was seen today for establish care. Diagnoses and all orders for this visit:    ESRD (end stage renal disease) on dialysis ([facility]    Cough productive of clear sputum  -     guaiFENesin-codeine (TUSSI-ORGANIDIN NR) 100-10 MG/5ML syrup; Take 10 mLs by mouth 2 times daily as needed for Cough or Congestion for up to 14 days. Uncontrolled type 1 diabetes mellitus with hyperglycemia (HCC)    HTN (hypertension), benign    Tobacco abuse  -     Cancel: Internal Referral To Smoking Cessation Program ([facility])  -     buPROPion [facility] FOR [facility] SR) 100 MG extended release tablet; Take 1 tablet by mouth every 48 hours  -     NC TOBACCO USE CESSATION INTERMEDIATE 3-10 MINUTES  -     Internal Referral To Smoking Cessation Program ([facility])    Tobacco dependence  -     NC TOBACCO USE CESSATION INTERMEDIATE 3-10 MINUTES  -     Internal Referral To Smoking Cessation Program ([facility])    Toxic effect of tobacco cigarettes, undetermined, initial encounter   -     NC TOBACCO USE CESSATION INTERMEDIATE 3-10 MINUTES    Patient is interested in quitting smoking at this time.   Referral to tobacco cessation program has been given. She is interested in starting Wellbutrin at this time and a prescription has been given to her with renally dosed Wellbutrin. I did advise her to confirm with her nephrologist the current dosing of her Wellbutrin and when she should take it in regards to her dialysis sessions. Educational materials and/or home exercises printed for patient's review and were included in patient instructions on his/her After Visit Summary and given to patient at the end of visit. Counseled regarding above diagnosis, including possible risks and complications, especially if left uncontrolled. Counseled regarding the possible side effects, risks, benefits and alternatives to treatment; patient and/or guardian verbalizes understanding, agrees, feels comfortable with and wishes to proceed with above treatment plan. Advised patient to call Cayetano Teague new medication issues, and read all Rx info from pharmacy to assure aware of all possible risks and side effects of medication before taking. Reviewed age and gender appropriate health screening exams and vaccinations. Advised patient regarding importance of keeping up with recommended health maintenance and to schedule as soon as possible if overdue, as this is important in assessing for undiagnosed pathology, especially cancer, as well as protecting against potentially harmful/life threatening disease. Patient verbalizes understanding and agrees with above counseling, assessment and plan. All questions answered.     Amy Olmedo DO  9/26/2021  9:24 AM

## 2021-09-21 NOTE — PATIENT INSTRUCTIONS
You are being starting on Wellbutrin, 100 mg every 48 hrs, for tobacco cessation. Please ask Dr. Ghanshyam Calzada about dosing instructions in regard to your dialysis schedule.

## 2021-10-04 DIAGNOSIS — L30.9 ECZEMA, UNSPECIFIED TYPE: Primary | ICD-10-CM

## 2021-10-04 RX ORDER — CLINDAMYCIN PHOSPHATE 11.9 MG/ML
SOLUTION TOPICAL
Qty: 60 ML | Refills: 1 | Status: SHIPPED
Start: 2021-10-04 | End: 2021-10-06

## 2021-10-06 DIAGNOSIS — L30.9 ECZEMA, UNSPECIFIED TYPE: Primary | ICD-10-CM

## 2021-10-06 RX ORDER — CLINDAMYCIN PHOSPHATE 10 MG/G
GEL TOPICAL
Qty: 1 EACH | Refills: 1 | Status: SHIPPED
Start: 2021-10-06 | End: 2021-10-07

## 2021-10-07 DIAGNOSIS — L30.9 ECZEMA, UNSPECIFIED TYPE: Primary | ICD-10-CM

## 2021-10-07 RX ORDER — CLINDAMYCIN PHOSPHATE 10 UG/ML
LOTION TOPICAL
Qty: 60 G | Refills: 2 | Status: SHIPPED
Start: 2021-10-07 | End: 2022-03-15 | Stop reason: SDUPTHER

## 2021-10-16 NOTE — ANESTHESIA POSTPROCEDURE EVALUATION
Department of Anesthesiology  Postprocedure Note    Patient: Walter Anderson  MRN: [id number]  YOB: 1985  Date of evaluation: 7/18/2018  Time:  6:31 AM     Procedure Summary     Date:  07/17/18 Room / Location:  [facility] OR 03 / [facility]    Anesthesia Start:   Anesthesia Stop:      Procedure:  REVISION AV FISTULA - LEFT ARM (Left ) Diagnosis:  (CHRONIC RENAL FAILURE)    Surgeon:  Sameera Tucker MD Responsible Provider:      Anesthesia Type:  Not recorded ASA Status:  Not recorded          Anesthesia Type: General    David Phase I: David Score: 10    David Phase II:      Last vitals: Reviewed and per EMR flowsheets.        Anesthesia Post Evaluation    Patient location during evaluation: PACU  Patient participation: complete - patient participated  Level of consciousness: awake  Airway patency: patent  Nausea & Vomiting: no nausea and no vomiting  Complications: no  Cardiovascular status: hemodynamically stable  Respiratory status: acceptable  Hydration status: stable Hospitalist Progress Note      Name:  Macie Dawson /Age/Sex: 3/18/1930  (79 y.o. female)   MRN & CSN:  [id number] & [id number] Admission Date/Time: 10/7/2021 10:27 AM   Location:  [address] [place] [address] PCP: [address] Jennifer Day: 10    Assessment and Plan:   Macie Dawson is a 79 y.o.  female  who presents with     Hyponatremia: Improving, nephrology on board, appreciate recs   Coagulopathy: Secondary to Coumadin patient INR significantly elevated on admission.  She was seen by cardiology and they recommended stopping anticoagulation given her age and risk of falls  Frequent falls: Pursuing ARU placement versus inpatient rehab   Acute CHF exacerbation: Continue diuresis per nephro recs   5. Elevated TSH: ?Overtreatment hypothyroidism, check Free T3, T4, will adjust meds based on levels--->T4 levels within normal limits, likely does not need any adjustments in regimen. T3 is low, however can fluctuate and not be a reliable indicator of thyroid function. Recommend outpatient labs in few months to continue monitoring. History of Present Illness:     Chief Complaint: <principal problem not specified>  Macie Dawson is a 79 y.o.  female  who presents with above    No acute issues today, very pleasant     Ten point ROS reviewed negative, unless as noted above    Objective:     No intake or output data in the 24 hours ending 10/16/21 1843   Vitals:   Vitals:    10/16/21 1219   BP:    Pulse:    Resp:    Temp:    SpO2: 93%     Physical Exam:   GEN Awake female, sitting upright in bed in no apparent distress. Appears given age. EYES Pupils are equally round. No scleral erythema, discharge, or conjunctivitis. NECK Supple, no apparent thyromegaly or masses. RESP Clear to auscultation, no wheezes, rales or rhonchi. Symmetric chest movement while on room air. CARDIO/VASC S1/S2 auscultated. Regular rate without appreciable murmurs, rubs, or gallops.   GI Abdomen is soft without significant tenderness, masses, or guarding. MSK No gross joint deformities. SKIN Normal coloration, warm, dry. PSYCH Awake, alert, oriented x 4. Affect appropriate.       Medications:   Medications:    torsemide  20 mg Oral Daily    warfarin  6 mg Oral Daily    cetirizine  10 mg Oral Daily    spironolactone  25 mg Oral Daily    albuterol sulfate HFA  2 puff Inhalation BID    aspirin  81 mg Oral Daily    atorvastatin  10 mg Oral Nightly    carvedilol  6.25 mg Oral BID WC    levothyroxine  88 mcg Oral Daily    ocuvite-lutein  1 tablet Oral Daily    vitamin B-12  1,000 mcg Oral Daily    sodium chloride flush  5-40 mL IntraVENous 2 times per day    tiotropium-olodaterol  2 puff Inhalation Daily    timolol  1 drop Both Eyes BID      Infusions:    sodium chloride       PRN Meds: sodium chloride flush, 5-40 mL, PRN  sodium chloride, 25 mL, PRN  ondansetron, 4 mg, Q8H PRN   Or  ondansetron, 4 mg, Q6H PRN  polyethylene glycol, 17 g, Daily PRN  acetaminophen, 650 mg, Q6H PRN   Or  acetaminophen, 650 mg, Q6H PRN          Electronically signed by Kane Barnes MD on 10/16/2021 at 6:43 PM

## 2022-01-01 NOTE — PLAN OF CARE
Problem: Falls - Risk of:  Goal: Will remain free from falls  Description: Will remain free from falls  1/28/2021 0404 by Javi De La Cruz RN  Outcome: Met This Shift  1/28/2021 0404 by Javi De La Cruz RN  Outcome: Met This Shift  Goal: Absence of physical injury  Description: Absence of physical injury  1/28/2021 0404 by Javi De La Cruz RN  Outcome: Met This Shift  1/28/2021 0404 by Javi De La Cruz RN  Outcome: Met This Shift stable

## 2022-01-20 DIAGNOSIS — L30.9 DERMATITIS: ICD-10-CM

## 2022-01-20 DIAGNOSIS — L73.2 HIDRADENITIS SUPPURATIVA: Primary | ICD-10-CM

## 2022-01-20 RX ORDER — CLOTRIMAZOLE AND BETAMETHASONE DIPROPIONATE 10; .64 MG/G; MG/G
CREAM TOPICAL
Qty: 45 G | Refills: 0 | Status: SHIPPED
Start: 2022-01-20 | End: 2022-03-15 | Stop reason: SDUPTHER

## 2022-01-25 ENCOUNTER — PATIENT MESSAGE (OUTPATIENT)
Dept: FAMILY MEDICINE CLINIC | Age: 37
End: 2022-01-25

## 2022-01-25 RX ORDER — DIAPER,BRIEF,INFANT-TODD,DISP
EACH MISCELLANEOUS DAILY
Qty: 30 G | Refills: 2 | Status: SHIPPED | OUTPATIENT
Start: 2022-01-25

## 2022-01-25 NOTE — TELEPHONE ENCOUNTER
Last Appointment:  9/21/2021  Future Appointments   Date Time Provider Titus Potter   2/3/2022 10:30 AM Dedra Horowitz MD [facility]   2/10/2022 11:30 AM Adolfo Sanchez, [address]

## 2022-01-25 NOTE — TELEPHONE ENCOUNTER
From: Jace Arreola  To: Dr. Moore Cassette: 1/25/2022 2:20 AM EST  Subject: Prescription Refill    Doctor is there away you can call in a refill for my Bacitracin Zinc Ointment USP (First Aid Antibiotic) the pharmacy that I use is [facility] [phone]

## 2022-02-02 ENCOUNTER — TELEPHONE (OUTPATIENT)
Dept: SURGERY | Age: 37
End: 2022-02-02

## 2022-02-02 NOTE — TELEPHONE ENCOUNTER
MA made attempt to contact patient to reschedule appointment w/ Dr Robert Velarde tomorrow due to impending weather. Patient did not answer so MA left  requesting return call to discuss.      Electronically signed by Pillo Hooker on 2/2/22 at 9:25 AM EST

## 2022-02-03 ENCOUNTER — TELEPHONE (OUTPATIENT)
Dept: SURGERY | Age: 37
End: 2022-02-03

## 2022-02-07 ENCOUNTER — TELEPHONE (OUTPATIENT)
Dept: SURGERY | Age: 37
End: 2022-02-07

## 2022-02-07 NOTE — TELEPHONE ENCOUNTER
Patient had to cancel her appointment with Dr Indira Morley for 2/10 Abcess- due to a conflicting appointment for her daughter in [place]. She is only able to come on Tuesdays or Thursdays because she has dialysis the other days of the week. Dr Indira Morley is not back in the office on a Thursday until 3/3. Patient did not want to wait that long for this to be taken care of, and she is not able to reschedule her daughters appointment. Attempted to reach office for guidance /busy with patient care. Is patient able to see another surgeon for this? Please contact patient to reschedule.

## 2022-02-08 NOTE — TELEPHONE ENCOUNTER
MA left message for patient to contact office to schedule. Patient has been previously seen by Dr. Melissa Conteh but on 05/19/2021 an I&D was completed while in the hospital by Dr. Ana Gilbert. [id number] Karlee Hendrix for patient to be seen by Dr. Ana Gilbert or first available surgeon.    Electronically signed by [facility] on 2/8/22 at 8:58 AM EST

## 2022-02-23 PROBLEM — R09.02 HYPOXIA: Status: ACTIVE | Noted: 2022-02-23

## 2022-03-14 DIAGNOSIS — R19.7 DIARRHEA OF PRESUMED INFECTIOUS ORIGIN: Primary | ICD-10-CM

## 2022-03-15 ENCOUNTER — OFFICE VISIT (OUTPATIENT)
Dept: FAMILY MEDICINE CLINIC | Age: 37
End: 2022-03-15
Payer: MEDICARE

## 2022-03-15 VITALS
RESPIRATION RATE: 16 BRPM | HEIGHT: 59 IN | DIASTOLIC BLOOD PRESSURE: 62 MMHG | BODY MASS INDEX: 30.84 KG/M2 | HEART RATE: 97 BPM | WEIGHT: 153 LBS | OXYGEN SATURATION: 98 % | TEMPERATURE: 97.7 F | SYSTOLIC BLOOD PRESSURE: 104 MMHG

## 2022-03-15 DIAGNOSIS — Z99.2 ESRD (END STAGE RENAL DISEASE) ON DIALYSIS (HCC): Primary | Chronic | ICD-10-CM

## 2022-03-15 DIAGNOSIS — R06.81 APNEIC EPISODE: ICD-10-CM

## 2022-03-15 DIAGNOSIS — L73.2 HIDRADENITIS SUPPURATIVA: ICD-10-CM

## 2022-03-15 DIAGNOSIS — L30.9 DERMATITIS: ICD-10-CM

## 2022-03-15 DIAGNOSIS — R06.83 SNORING: ICD-10-CM

## 2022-03-15 DIAGNOSIS — R06.02 SOB (SHORTNESS OF BREATH): ICD-10-CM

## 2022-03-15 DIAGNOSIS — N18.6 ESRD (END STAGE RENAL DISEASE) ON DIALYSIS (HCC): Primary | Chronic | ICD-10-CM

## 2022-03-15 DIAGNOSIS — L30.9 ECZEMA, UNSPECIFIED TYPE: ICD-10-CM

## 2022-03-15 DIAGNOSIS — G47.39 SLEEP APNEA-LIKE BEHAVIOR: ICD-10-CM

## 2022-03-15 PROCEDURE — 4004F PT TOBACCO SCREEN RCVD TLK: CPT | Performed by: INTERNAL MEDICINE

## 2022-03-15 PROCEDURE — G8484 FLU IMMUNIZE NO ADMIN: HCPCS | Performed by: INTERNAL MEDICINE

## 2022-03-15 PROCEDURE — 99214 OFFICE O/P EST MOD 30 MIN: CPT | Performed by: INTERNAL MEDICINE

## 2022-03-15 PROCEDURE — G8417 CALC BMI ABV UP PARAM F/U: HCPCS | Performed by: INTERNAL MEDICINE

## 2022-03-15 PROCEDURE — G8427 DOCREV CUR MEDS BY ELIG CLIN: HCPCS | Performed by: INTERNAL MEDICINE

## 2022-03-15 RX ORDER — CLINDAMYCIN PHOSPHATE 10 MG/G
GEL TOPICAL
Qty: 1 EACH | Refills: 1 | Status: CANCELLED | OUTPATIENT
Start: 2022-03-15 | End: 2022-03-22

## 2022-03-15 RX ORDER — CLINDAMYCIN PHOSPHATE 10 UG/ML
LOTION TOPICAL
Qty: 60 G | Refills: 5 | Status: SHIPPED | OUTPATIENT
Start: 2022-03-15 | End: 2022-04-14

## 2022-03-15 RX ORDER — ALBUTEROL SULFATE 2.5 MG/3ML
2.5 SOLUTION RESPIRATORY (INHALATION) EVERY 6 HOURS PRN
Qty: 90 EACH | Refills: 3 | Status: SHIPPED | OUTPATIENT
Start: 2022-03-15 | End: 2022-08-29

## 2022-03-15 RX ORDER — CLOTRIMAZOLE AND BETAMETHASONE DIPROPIONATE 10; .64 MG/G; MG/G
CREAM TOPICAL
Qty: 45 G | Refills: 5 | Status: SHIPPED | OUTPATIENT
Start: 2022-03-15

## 2022-03-15 NOTE — PROGRESS NOTES
[facility]  [address] [address]  Dept: [phone]  Dept Fax: [phone]     NAME: Cheng Cerrato        :  1985        MRN:  [id number]    Chief Complaint   Patient presents with    Shortness of Breath     Requesting aerosol machine / also requesting testing for sleep apnea     Diarrhea     Patient states that she had diarrhea since last week and then it ended 2 days ago / did not habe labs done yet. Subjective     History of Present Illness  Cheng Cerrato is a 39 y.o. female who presents today for routine follow up and medication refill. Patient wanting to be tested for sleep apnea. She reports that she is having frequent night time awakenings gasping for air. She is fatigued and short of breath throughout the day. She does snore. She has never been evaluated for sleep apnea, but is concerned that she may have it. Missed dialysis on Monday this week so she does report come increased shortness of breath related to volume overload, but the sleepiness and fatigue she is experiencing has been ongoing and is unrelated to missed dialysis sessions. She is requesting a nebulizer machine for albuterol treatments. She uses an albuterol inhaler occasionally that helps, but at times she needs an extended treatment. She has received nebulized treatments in the past which have helped her symptoms. Will be getting blood work for endocrinology next week. She will have the results faxed to us here as well. She has been having diarrhea since last week. A [employer] message was sent regarding this complaint and stool studies were ordered, but were not completed. The diarrhea spontaneously resolved without intervention a couple days ago. Review of Systems  Please see HPI above. All bolded are positive.   Gen: fever, chills, fatigue, weakness, diaphoresis, unintentional weight change  Head: headache, vision change, hearing loss  Chest: chest pain/heaviness, palpitations  Lungs: shortness of breath, wheezing, coughing, hemoptysis  Abdomen: abdominal pain, nausea, vomiting, diarrhea, constipation, melena, hematochezia, hematemesis, loss of appetite  Extremities: lower extremity edema, myalgias, arthralgias  Urinary: dysuria, hematuria, weak flow, increase in frequency  Neurologic: lightheadedness, dizziness, confusion, syncope  Endocrine: polydipsia, polyuria, heat or cold intolerance  Psychiatric: depression, suicidal ideation, anxiety  Derm: Rashes, ulcers, burns    Past Medical Hx:  Past Medical History:   Diagnosis Date    JOLLY [facility]acute kidney injury) ([facility] 2016    AVF (arteriovenous fistula) ([facility] 2018    let arm    Chronic kidney disease     [facility]    DM type 1 (diabetes mellitus, type 1) ([facility]     Encounter regarding vascular access for [facility] for ESRD [facility] 2018    ESRD (end stage renal disease) ([facility]     Hemodialysis patient ([facility]     amrita dawson  / graft in left arm    Hypertension     Tobacco abuse 2016       Past Surgical Hx:  Past Surgical History:   Procedure Laterality Date     SECTION  2013    CYST INCISION AND DRAINAGE  2011    perirectal abscess. [facility].  Dr. Cely Seay Left 2019    LEFT ARM FISTULAGRAM, BALLOON ANGIOPLASTY performed by Fercho Solis MD at [address]      FRACTURE SURGERY      fracture right ulnar, left tibia, and pelvis    OTHER SURGICAL HISTORY      open reduction internal fixation left radial shaft    OTHER SURGICAL HISTORY  2016    pericardial window    OTHER SURGICAL HISTORY  2016     r tesio insertion  / remove 2018    OTHER SURGICAL HISTORY Left 2017    insertion a-v fistula left arm    FL ANASTOMOSIS,AV,ANY SITE Left 2018 473 mL 2    albuterol sulfate  (90 Base) MCG/ACT inhaler Inhale 2 puffs into the lungs every 4-6 hours as needed for Wheezing or Shortness of Breath 1 Inhaler 2    B Complex-C-Folic Acid (YOLANDA-JACOB) TABS TAKE 1 TABLET BY MOUTH DAILY WITH SUPPER 30 tablet 5    hydrALAZINE (APRESOLINE) 50 MG tablet Take 1 tablet by mouth every 8 hours 90 tablet 3    Insulin Glargine, 2 Unit Dial, (TOUJEO MAX SOLOSTAR) 300 UNIT/ML SOPN Inject 16 Units into the skin nightly       insulin lispro, 1 Unit Dial, (HUMALOG KWIKPEN) 100 UNIT/ML SOPN Inject 10 Units into the skin 3 times daily (before meals) *Plus Sliding Scale*      famotidine (PEPCID) 20 MG tablet Take 1 tablet by mouth every morning 30 tablet 3    medroxyPROGESTERone (DEPO-PROVERA) 150 MG/ML injection Inject 150 mg into the muscle every 3 months   3    carvedilol (COREG) 25 MG tablet Take 50 mg by mouth every morning 2 tabs am and one tab pm  5     No current facility-administered medications for this visit. Allergies:  No Known Allergies    Objective     Vitals:    03/15/22 1254   BP: 104/62   Pulse: 97   Resp: 16   Temp: 97.7 °F (36.5 °C)   TempSrc: Temporal   SpO2: 98%   Weight: 153 lb (69.4 kg)   Height: 4' 11\" (1.499 m)        Physical Exam  General: Awake, alert, and oriented to person, place, time, and purpose, appears stated age and cooperative, No acute distress  Head: Normocephalic, atraumatic  Eyes: conjunctivae/corneas clear, EOMI  Mouth: Mucous membranes moist with no pharyngeal exudate or erythema  Neck: no JVD, no adenopathy, no carotid bruit, supple, symmetrical, trachea midline  Back: symmetric, ROM normal, No CVA tenderness.   Lungs: clear to auscultation bilaterally without wheezes, rales, or rhonchi  Heart: regular rate and rhythm, S1, S2 normal, no murmur, click, rub or gallop  Abdomen: soft, non-tender; bowel sounds normal; no masses,  no organomegaly  Extremities: atraumatic, no cyanosis, no edema  Skin: color, texture, turgor within normal limits. No rashes or lesions   Neurologic:speech appropriate, moves all 4 extremities, normal muscle strength and tone, CN 2-12 grossly intact    Labs:  Lab Results   Component Value Date    WBC 4.8 02/22/2022    HGB 9.3 (L) 02/22/2022    HCT 28.5 (L) 02/22/2022     02/22/2022     02/22/2022    K 4.7 02/22/2022    CL 99 02/22/2022    CREATININE 8.34 (H) 02/22/2022    BUN 52 (H) 02/22/2022    CO2 31 (H) 02/22/2022    GLUCOSE 114 (H) 02/22/2022    ALT 32 05/19/2021    AST 34 (H) 05/19/2021    INR 1.1 11/26/2018     Lab Results   Component Value Date    TSH 3.850 01/27/2021     Lab Results   Component Value Date    TRIG 68 10/08/2013     Lab Results   Component Value Date    HDL 40 01/29/2021    HDL 50.0 10/08/2013     Lab Results   Component Value Date    LDLCALC 41 01/29/2021    LDLCALC 91 10/08/2013     Lab Results   Component Value Date    LABA1C 8.8 08/17/2021     Lab Results   Component Value Date    INR 1.1 11/26/2018    INR 1.0 07/17/2018    INR 1.0 02/17/2017    PROTIME 12.2 11/26/2018    PROTIME 11.8 07/17/2018    PROTIME 10.6 02/17/2017      *All recent labs were reviewed. Please see electronic chart for a more comprehensive set of labs    Radiology:  XR CHEST PORTABLE    Result Date: 2/22/2022  Patient Name:                Celsa Espinoza MRN:                         [id number] Acct#:                       [id number] Diagnostic Radiology ACCESSION                 EXAM [id number]             2/22/2022 19:33 EST      CR Chest Portable         MD KATRINA, JOSEPH  D CPT code 25835 Reason For Exam (CR Chest Portable) sob Report Chest one view HISTORY: Short of breath The bilateral mid to lower lung infiltrates. Heart and pulmonary vessels are normal. No pleural effusions. IMPRESSION: Bilateral mid to lower lung infiltrates.  Report Dictated on Workstation: [id number] ---** Final ---** Dictated: 02/22/2022 7:38 pm Dictating Physician: MD REAL MALAY Signed Date and Time: 02/22/2022 7:39 pm Signed by: MD REAL MALAY Transcribed Date and Time: 02/22/2022 7:38      Assessment and Plan     Patient is a 39 y.o. female who presented to the office for follow up. Full problem list is as follows:  Patient Active Problem List   Diagnosis    ESRD (end stage renal disease) [facility] ([facility]    HTN (hypertension), benign    Uncontrolled type 1 diabetes mellitus with hyperglycemia ([facility]    Chronic deep vein thrombosis (DVT) of right upper extremity (HCC)    Facial cellulitis    Acute hyperkalemia    Nasal polyp    Periorbital cellulitis of right eye    Periorbital cellulitis    Vitamin B deficiency, unspecified    Abscess    Nasal congestion    Dry cough    Hidradenitis suppurativa    Symptomatic anemia    Anemia    Acute congestive heart failure (HCC)    Volume overload    Gall stone    Non-compliance    AMS (altered mental status)    Groin abscess    Hypoxia       Nelia Bolivar was seen today for shortness of breath and diarrhea. Diagnoses and all orders for this visit:    ESRD (end stage renal disease) on dialysis ([facility]    Eczema, unspecified type  -     clindamycin (CLEOCIN T) 1 % lotion; Apply topically 2 times daily. SOB (shortness of breath)  -     DME Order for Nebulizer as OP  -     albuterol (PROVENTIL) (2.5 MG/3ML) 0.083% nebulizer solution; Take 3 mLs by nebulization every 6 hours as needed for Wheezing or Shortness of Breath  -     Baseline Diagnostic Sleep Study; Future    Hidradenitis suppurativa  -     clotrimazole-betamethasone (LOTRISONE) 1-0.05 % cream; Apply topically 2 times daily. Dermatitis  -     clotrimazole-betamethasone (LOTRISONE) 1-0.05 % cream; Apply topically 2 times daily. Sleep apnea-like behavior  -     Baseline Diagnostic Sleep Study; Future    Snoring  -     Baseline Diagnostic Sleep Study;  Future    Apneic episode  -     Baseline Diagnostic Sleep Study; Future        Educational materials and/or home exercises printed for patient's review and were included in patient instructions on his/her After Visit Summary and given to patient at the end of visit. Counseled regarding above diagnosis, including possible risks and complications, especially if left uncontrolled. Counseled regarding the possible side effects, risks, benefits and alternatives to treatment; patient and/or guardian verbalizes understanding, agrees, feels comfortable with and wishes to proceed with above treatment plan. Advised patient to call [facility] July new medication issues, and read all Rx info from pharmacy to assure aware of all possible risks and side effects of any medication before taking. Patient verbalizes understanding and agrees with above counseling, assessment and plan. All questions answered.     Vaibhav Mendes, DO

## 2022-03-17 ENCOUNTER — APPOINTMENT (OUTPATIENT)
Dept: CT IMAGING | Age: 37
End: 2022-03-17
Payer: MEDICARE

## 2022-03-17 ENCOUNTER — TELEPHONE (OUTPATIENT)
Dept: FAMILY MEDICINE CLINIC | Age: 37
End: 2022-03-17

## 2022-03-17 ENCOUNTER — HOSPITAL ENCOUNTER (OUTPATIENT)
Age: 37
Setting detail: OBSERVATION
Discharge: HOME HEALTH | End: 2022-03-19
Attending: EMERGENCY MEDICINE | Admitting: FAMILY MEDICINE
Payer: MEDICARE

## 2022-03-17 ENCOUNTER — APPOINTMENT (OUTPATIENT)
Dept: GENERAL RADIOLOGY | Age: 37
End: 2022-03-17
Payer: MEDICARE

## 2022-03-17 DIAGNOSIS — R41.82 ALTERED MENTAL STATUS, UNSPECIFIED ALTERED MENTAL STATUS TYPE: Primary | ICD-10-CM

## 2022-03-17 PROBLEM — I95.9 HYPOTENSION: Status: ACTIVE | Noted: 2022-03-17

## 2022-03-17 LAB
ALBUMIN SERPL-MCNC: 3.1 G/DL (ref 3.5–5.2)
ALP BLD-CCNC: 127 U/L (ref 35–104)
ALT SERPL-CCNC: 22 U/L (ref 0–32)
ANION GAP SERPL CALCULATED.3IONS-SCNC: 15 MMOL/L (ref 7–16)
AST SERPL-CCNC: 38 U/L (ref 0–31)
B.E.: -1.1 MMOL/L (ref -3–3)
BASOPHILS ABSOLUTE: 0.01 E9/L (ref 0–0.2)
BASOPHILS RELATIVE PERCENT: 0.2 % (ref 0–2)
BILIRUB SERPL-MCNC: 0.5 MG/DL (ref 0–1.2)
BILIRUBIN DIRECT: 0.3 MG/DL (ref 0–0.3)
BILIRUBIN, INDIRECT: 0.2 MG/DL (ref 0–1)
BUN BLDV-MCNC: 39 MG/DL (ref 6–20)
CALCIUM SERPL-MCNC: 8.4 MG/DL (ref 8.6–10.2)
CHLORIDE BLD-SCNC: 94 MMOL/L (ref 98–107)
CHP ED QC CHECK: YES
CHP ED QC CHECK: YES
CO2: 24 MMOL/L (ref 22–29)
COHB: 3.6 % (ref 0–1.5)
CREAT SERPL-MCNC: 7.7 MG/DL (ref 0.5–1)
CRITICAL: ABNORMAL
DATE ANALYZED: ABNORMAL
DATE OF COLLECTION: ABNORMAL
EKG ATRIAL RATE: 79 BPM
EKG P AXIS: 41 DEGREES
EKG P-R INTERVAL: 136 MS
EKG Q-T INTERVAL: 418 MS
EKG QRS DURATION: 66 MS
EKG QTC CALCULATION (BAZETT): 479 MS
EKG R AXIS: 26 DEGREES
EKG T AXIS: 53 DEGREES
EKG VENTRICULAR RATE: 79 BPM
EOSINOPHILS ABSOLUTE: 0.14 E9/L (ref 0.05–0.5)
EOSINOPHILS RELATIVE PERCENT: 2.2 % (ref 0–6)
GFR AFRICAN AMERICAN: 7
GFR NON-AFRICAN AMERICAN: 7 ML/MIN/1.73
GLUCOSE BLD-MCNC: 337 MG/DL (ref 74–99)
GONADOTROPIN, CHORIONIC (HCG) QUANT: 0.8 MIU/ML
HCO3: 23.4 MMOL/L (ref 22–26)
HCT VFR BLD CALC: 22.6 % (ref 34–48)
HEMOGLOBIN: 7.8 G/DL (ref 11.5–15.5)
HHB: 2.4 % (ref 0–5)
IMMATURE GRANULOCYTES #: 0.02 E9/L
IMMATURE GRANULOCYTES %: 0.3 % (ref 0–5)
LAB: ABNORMAL
LACTIC ACID, SEPSIS: 1.9 MMOL/L (ref 0.5–1.9)
LYMPHOCYTES ABSOLUTE: 1.01 E9/L (ref 1.5–4)
LYMPHOCYTES RELATIVE PERCENT: 15.9 % (ref 20–42)
Lab: ABNORMAL
MCH RBC QN AUTO: 30.6 PG (ref 26–35)
MCHC RBC AUTO-ENTMCNC: 34.5 % (ref 32–34.5)
MCV RBC AUTO: 88.6 FL (ref 80–99.9)
METER GLUCOSE: 239 MG/DL (ref 74–99)
METER GLUCOSE: >500 MG/DL (ref 74–99)
METHB: 0.3 % (ref 0–1.5)
MODE: ABNORMAL
MONOCYTES ABSOLUTE: 0.43 E9/L (ref 0.1–0.95)
MONOCYTES RELATIVE PERCENT: 6.8 % (ref 2–12)
NEUTROPHILS ABSOLUTE: 4.75 E9/L (ref 1.8–7.3)
NEUTROPHILS RELATIVE PERCENT: 74.6 % (ref 43–80)
O2 CONTENT: 12.3 ML/DL
O2 SATURATION: 97.5 % (ref 92–98.5)
O2HB: 93.7 % (ref 94–97)
OPERATOR ID: 1926
PATIENT TEMP: 37 C
PCO2: 38.2 MMHG (ref 35–45)
PDW BLD-RTO: 16.5 FL (ref 11.5–15)
PH BLOOD GAS: 7.41 (ref 7.35–7.45)
PLATELET # BLD: 152 E9/L (ref 130–450)
PMV BLD AUTO: 9.6 FL (ref 7–12)
PO2: 103 MMHG (ref 75–100)
POTASSIUM REFLEX MAGNESIUM: 3.7 MMOL/L (ref 3.5–5)
PRO-BNP: ABNORMAL PG/ML (ref 0–125)
RBC # BLD: 2.55 E12/L (ref 3.5–5.5)
SODIUM BLD-SCNC: 133 MMOL/L (ref 132–146)
SOURCE, BLOOD GAS: ABNORMAL
THB: 9.2 G/DL (ref 11.5–16.5)
TIME ANALYZED: 1355
TOTAL PROTEIN: 6.5 G/DL (ref 6.4–8.3)
TROPONIN, HIGH SENSITIVITY: 120 NG/L (ref 0–9)
TROPONIN, HIGH SENSITIVITY: 122 NG/L (ref 0–9)
WBC # BLD: 6.4 E9/L (ref 4.5–11.5)

## 2022-03-17 PROCEDURE — 96361 HYDRATE IV INFUSION ADD-ON: CPT

## 2022-03-17 PROCEDURE — 85025 COMPLETE CBC W/AUTO DIFF WBC: CPT

## 2022-03-17 PROCEDURE — 71045 X-RAY EXAM CHEST 1 VIEW: CPT

## 2022-03-17 PROCEDURE — 87040 BLOOD CULTURE FOR BACTERIA: CPT

## 2022-03-17 PROCEDURE — 93005 ELECTROCARDIOGRAM TRACING: CPT | Performed by: STUDENT IN AN ORGANIZED HEALTH CARE EDUCATION/TRAINING PROGRAM

## 2022-03-17 PROCEDURE — G0378 HOSPITAL OBSERVATION PER HR: HCPCS

## 2022-03-17 PROCEDURE — 82962 GLUCOSE BLOOD TEST: CPT

## 2022-03-17 PROCEDURE — 82805 BLOOD GASES W/O2 SATURATION: CPT

## 2022-03-17 PROCEDURE — 74176 CT ABD & PELVIS W/O CONTRAST: CPT

## 2022-03-17 PROCEDURE — 83605 ASSAY OF LACTIC ACID: CPT

## 2022-03-17 PROCEDURE — 83880 ASSAY OF NATRIURETIC PEPTIDE: CPT

## 2022-03-17 PROCEDURE — 2580000003 HC RX 258: Performed by: STUDENT IN AN ORGANIZED HEALTH CARE EDUCATION/TRAINING PROGRAM

## 2022-03-17 PROCEDURE — 99284 EMERGENCY DEPT VISIT MOD MDM: CPT

## 2022-03-17 PROCEDURE — 96375 TX/PRO/DX INJ NEW DRUG ADDON: CPT

## 2022-03-17 PROCEDURE — 84702 CHORIONIC GONADOTROPIN TEST: CPT

## 2022-03-17 PROCEDURE — 80076 HEPATIC FUNCTION PANEL: CPT

## 2022-03-17 PROCEDURE — 70450 CT HEAD/BRAIN W/O DYE: CPT

## 2022-03-17 PROCEDURE — 80048 BASIC METABOLIC PNL TOTAL CA: CPT

## 2022-03-17 PROCEDURE — 84484 ASSAY OF TROPONIN QUANT: CPT

## 2022-03-17 PROCEDURE — 93010 ELECTROCARDIOGRAM REPORT: CPT | Performed by: INTERNAL MEDICINE

## 2022-03-17 RX ORDER — DEXTROSE MONOHYDRATE 25 G/50ML
12.5 INJECTION, SOLUTION INTRAVENOUS PRN
Status: DISCONTINUED | OUTPATIENT
Start: 2022-03-17 | End: 2022-03-19 | Stop reason: HOSPADM

## 2022-03-17 RX ORDER — SODIUM CHLORIDE 0.9 % (FLUSH) 0.9 %
10 SYRINGE (ML) INJECTION PRN
Status: DISCONTINUED | OUTPATIENT
Start: 2022-03-17 | End: 2022-03-19 | Stop reason: HOSPADM

## 2022-03-17 RX ORDER — POLYETHYLENE GLYCOL 3350 17 G/17G
17 POWDER, FOR SOLUTION ORAL DAILY PRN
Status: DISCONTINUED | OUTPATIENT
Start: 2022-03-17 | End: 2022-03-19 | Stop reason: HOSPADM

## 2022-03-17 RX ORDER — ACETAMINOPHEN 650 MG/1
650 SUPPOSITORY RECTAL EVERY 6 HOURS PRN
Status: DISCONTINUED | OUTPATIENT
Start: 2022-03-17 | End: 2022-03-19 | Stop reason: HOSPADM

## 2022-03-17 RX ORDER — ALBUTEROL SULFATE 2.5 MG/3ML
2.5 SOLUTION RESPIRATORY (INHALATION) EVERY 6 HOURS PRN
Status: DISCONTINUED | OUTPATIENT
Start: 2022-03-17 | End: 2022-03-19 | Stop reason: HOSPADM

## 2022-03-17 RX ORDER — ONDANSETRON 2 MG/ML
4 INJECTION INTRAMUSCULAR; INTRAVENOUS EVERY 6 HOURS PRN
Status: DISCONTINUED | OUTPATIENT
Start: 2022-03-17 | End: 2022-03-19 | Stop reason: HOSPADM

## 2022-03-17 RX ORDER — CARVEDILOL 25 MG/1
50 TABLET ORAL EVERY MORNING
Status: DISCONTINUED | OUTPATIENT
Start: 2022-03-18 | End: 2022-03-19 | Stop reason: HOSPADM

## 2022-03-17 RX ORDER — FAMOTIDINE 20 MG/1
10 TABLET, FILM COATED ORAL EVERY MORNING
Status: DISCONTINUED | OUTPATIENT
Start: 2022-03-18 | End: 2022-03-19 | Stop reason: HOSPADM

## 2022-03-17 RX ORDER — FAMOTIDINE 20 MG/1
20 TABLET, FILM COATED ORAL EVERY MORNING
Status: DISCONTINUED | OUTPATIENT
Start: 2022-03-18 | End: 2022-03-17 | Stop reason: DRUGHIGH

## 2022-03-17 RX ORDER — BUPROPION HYDROCHLORIDE 100 MG/1
100 TABLET, EXTENDED RELEASE ORAL
Status: DISCONTINUED | OUTPATIENT
Start: 2022-03-18 | End: 2022-03-19 | Stop reason: HOSPADM

## 2022-03-17 RX ORDER — DEXTROSE MONOHYDRATE 50 MG/ML
100 INJECTION, SOLUTION INTRAVENOUS PRN
Status: DISCONTINUED | OUTPATIENT
Start: 2022-03-17 | End: 2022-03-19 | Stop reason: HOSPADM

## 2022-03-17 RX ORDER — SODIUM CHLORIDE 0.9 % (FLUSH) 0.9 %
10 SYRINGE (ML) INJECTION EVERY 12 HOURS SCHEDULED
Status: DISCONTINUED | OUTPATIENT
Start: 2022-03-17 | End: 2022-03-19 | Stop reason: HOSPADM

## 2022-03-17 RX ORDER — HYDRALAZINE HYDROCHLORIDE 50 MG/1
50 TABLET, FILM COATED ORAL EVERY 8 HOURS SCHEDULED
Status: DISCONTINUED | OUTPATIENT
Start: 2022-03-17 | End: 2022-03-18

## 2022-03-17 RX ORDER — HEPARIN SODIUM 10000 [USP'U]/ML
5000 INJECTION, SOLUTION INTRAVENOUS; SUBCUTANEOUS EVERY 8 HOURS
Status: DISCONTINUED | OUTPATIENT
Start: 2022-03-17 | End: 2022-03-19 | Stop reason: HOSPADM

## 2022-03-17 RX ORDER — INSULIN GLARGINE-YFGN 100 [IU]/ML
0.25 INJECTION, SOLUTION SUBCUTANEOUS NIGHTLY
Status: DISCONTINUED | OUTPATIENT
Start: 2022-03-17 | End: 2022-03-19 | Stop reason: HOSPADM

## 2022-03-17 RX ORDER — SODIUM CHLORIDE 9 MG/ML
25 INJECTION, SOLUTION INTRAVENOUS PRN
Status: DISCONTINUED | OUTPATIENT
Start: 2022-03-17 | End: 2022-03-19 | Stop reason: HOSPADM

## 2022-03-17 RX ORDER — ACETAMINOPHEN 325 MG/1
650 TABLET ORAL EVERY 6 HOURS PRN
Status: DISCONTINUED | OUTPATIENT
Start: 2022-03-17 | End: 2022-03-19 | Stop reason: HOSPADM

## 2022-03-17 RX ORDER — 0.9 % SODIUM CHLORIDE 0.9 %
1000 INTRAVENOUS SOLUTION INTRAVENOUS ONCE
Status: COMPLETED | OUTPATIENT
Start: 2022-03-17 | End: 2022-03-17

## 2022-03-17 RX ORDER — PROMETHAZINE HYDROCHLORIDE 25 MG/1
12.5 TABLET ORAL EVERY 6 HOURS PRN
Status: DISCONTINUED | OUTPATIENT
Start: 2022-03-17 | End: 2022-03-19 | Stop reason: HOSPADM

## 2022-03-17 RX ORDER — NICOTINE POLACRILEX 4 MG
15 LOZENGE BUCCAL PRN
Status: DISCONTINUED | OUTPATIENT
Start: 2022-03-17 | End: 2022-03-19 | Stop reason: HOSPADM

## 2022-03-17 RX ADMIN — SODIUM CHLORIDE 1000 ML: 9 INJECTION, SOLUTION INTRAVENOUS at 12:21

## 2022-03-17 ASSESSMENT — ENCOUNTER SYMPTOMS
NAUSEA: 0
FACIAL SWELLING: 1
SORE THROAT: 0
SINUS PRESSURE: 0
VOMITING: 0
EYE DISCHARGE: 0
ABDOMINAL PAIN: 1
BACK PAIN: 0
COUGH: 0
EYE PAIN: 0
SHORTNESS OF BREATH: 1
DIARRHEA: 0
WHEEZING: 0

## 2022-03-17 ASSESSMENT — PAIN DESCRIPTION - PAIN TYPE: TYPE: ACUTE PAIN

## 2022-03-17 ASSESSMENT — PAIN - FUNCTIONAL ASSESSMENT: PAIN_FUNCTIONAL_ASSESSMENT: 0-10

## 2022-03-17 ASSESSMENT — PAIN DESCRIPTION - DESCRIPTORS: DESCRIPTORS: ACHING

## 2022-03-17 ASSESSMENT — PAIN SCALES - GENERAL
PAINLEVEL_OUTOF10: 8
PAINLEVEL_OUTOF10: 0

## 2022-03-17 NOTE — TELEPHONE ENCOUNTER
Spoke with [employer] and they are in the process of getting a prior auth. Left message for patient.

## 2022-03-17 NOTE — ED NOTES
Blood drawn, EKG done and pt placed on tele monitor. Will continue to monitor.      Kavita Carrasco RN  03/17/22 8631

## 2022-03-17 NOTE — ED PROVIDER NOTES
50-year-old female, ESRD on dialysis, Tuesday Thursday Saturday presenting to the emergency department for low blood pressure. She states she has some abdominal pain and chest pain as well, symptoms moderate in severity, began today, worse with palpation, nothing makes it better, gradual onset. Patient is a somewhat poor historian so this limits the history. Review of Systems   Constitutional: Negative for chills and fever. HENT: Positive for facial swelling. Negative for ear pain, sinus pressure and sore throat. Eyes: Negative for pain and discharge. Respiratory: Positive for shortness of breath. Negative for cough and wheezing. Cardiovascular: Positive for chest pain. Gastrointestinal: Positive for abdominal pain. Negative for diarrhea, nausea and vomiting. Genitourinary: Negative for dysuria and frequency. Musculoskeletal: Negative for arthralgias and back pain. Skin: Negative for rash and wound. Neurological: Negative for weakness and headaches. Hematological: Negative for adenopathy. All other systems reviewed and are negative. Physical Exam  Vitals and nursing note reviewed. Constitutional:       General: She is in acute distress (Appears to be in pain). Appearance: Normal appearance. HENT:      Head: Normocephalic and atraumatic. Right Ear: External ear normal.      Left Ear: External ear normal.      Nose: Nose normal.      Mouth/Throat:      Mouth: Mucous membranes are moist.   Eyes:      Extraocular Movements: Extraocular movements intact. Pupils: Pupils are equal, round, and reactive to light. Comments: Eyes and cheeks appear edematous   Cardiovascular:      Rate and Rhythm: Normal rate and regular rhythm. Pulses: Normal pulses. Heart sounds: Normal heart sounds. Pulmonary:      Effort: Pulmonary effort is normal.      Breath sounds: Normal breath sounds. Abdominal:      General: Abdomen is flat.  Bowel sounds are normal. There is distension. Palpations: Abdomen is soft. Tenderness: There is abdominal tenderness (Generalized throughout). Musculoskeletal:         General: Normal range of motion. Cervical back: Normal range of motion and neck supple. Skin:     General: Skin is warm and dry. Neurological:      Mental Status: She is alert. Procedures     MDM     ED Course as of 03/17/22 1855   Thu Mar 17, 2022   1216 Blood pressure 139/66, improved after receiving fluid resuscitation [JG]   [facility] accepted patient for admission. [JG]   1854 EKG: This EKG is signed by emergency department physician. Rate: 79  Rhythm: Sinus  Interpretation: non-specific EKG  Comparison: stable as compared to patient's most recent EKG      [JG]   254 69-year-old female presented emergency department for hypotension, sent in for dialysis was 1 hour treatment blood pressure was 80/30, was given fluids with improvement of blood pressure, patient CT abdomen pelvis and CT head was not specific, however she was altered, and did not have improvement with fluid resuscitation, admitted for altered mental status. [JG]      ED Course User Index  [JG] Jj Aguero MD      69-year-old female presented emergency department for hypotension, sent in for dialysis was 1 hour treatment blood pressure was 80/30, was given fluids with improvement of blood pressure, patient CT abdomen pelvis and CT head was not specific, however she was altered, and did not have improvement with fluid resuscitation, admitted for altered mental status. ED Course as of 03/17/22 1855   Thu Mar 17, 2022   1216 Blood pressure 139/66, improved after receiving fluid resuscitation [JG]   [facility] accepted patient for admission. [JG]   1854 EKG: This EKG is signed by emergency department physician.     Rate: 79  Rhythm: Sinus  Interpretation: non-specific EKG  Comparison: stable as compared to patient's most recent EKG      [JG]   254 69-year-old female presented emergency department for hypotension, sent in for dialysis was 1 hour treatment blood pressure was 80/30, was given fluids with improvement of blood pressure, patient CT abdomen pelvis and CT head was not specific, however she was altered, and did not have improvement with fluid resuscitation, admitted for altered mental status. [JG]      ED Course User Index  [JG] Raffi Saleem MD       --------------------------------------------- PAST HISTORY ---------------------------------------------  Past Medical History:  has a past medical history of JOLLY (acute kidney injury) ([facility] [address] AVF (arteriovenous fistula) ([facility] [address] Chronic kidney disease, Dialysis AV fistula malfunction, initial encounter ([facility] [address] DM type 1 (diabetes mellitus, type 1) ([facility] [address] Encounter regarding vascular access for dialysis for ESRD ([facility] [address] ESRD (end stage renal disease) ([facility] [address] Hemodialysis patient ([facility] [address] Hypertension, and Tobacco abuse. Past Surgical History:  has a past surgical history that includes Dilation and curettage of uterus (); cyst incision and drainage (2011); other surgical history (); fracture surgery (); other surgical history (2016); other surgical history (2016); other surgical history (Left, 2017);  section (); pr insert non-tunnel cv cath (N/A, 5/10/2018); pr anastomosis,av,any site (Left, 2018); pr vitrectomy pars plana remove preretinal membrane (Left, 2018); Dialysis fistula creation (Left, 2019); and Rectal surgery (N/A, 2021). Social History:  reports that she has been smoking cigarettes. She has smoked for the past 15.00 years. She has never used smokeless tobacco. She reports previous alcohol use. She reports that she does not use drugs. Family History: family history includes Cancer in her father; Diabetes in her paternal aunt; Stroke in her mother. The patients home medications have been reviewed.     Allergies: Patient has no known allergies.     -------------------------------------------------- RESULTS -------------------------------------------------    LABS:  Results for orders placed or performed during the hospital encounter of 03/17/22   Lactate, Sepsis   Result Value Ref Range    Lactic Acid, Sepsis 1.9 0.5 - 1.9 mmol/L   CBC with Auto Differential   Result Value Ref Range    WBC 6.4 4.5 - 11.5 E9/L    RBC 2.55 (L) 3.50 - 5.50 E12/L    Hemoglobin 7.8 (L) 11.5 - 15.5 g/dL    Hematocrit 22.6 (L) 34.0 - 48.0 %    MCV 88.6 80.0 - 99.9 fL    MCH 30.6 26.0 - 35.0 pg    MCHC 34.5 32.0 - 34.5 %    RDW 16.5 (H) 11.5 - 15.0 fL    Platelets 187 973 - 208 E9/L    MPV 9.6 7.0 - 12.0 fL    Neutrophils % 74.6 43.0 - 80.0 %    Immature Granulocytes % 0.3 0.0 - 5.0 %    Lymphocytes % 15.9 (L) 20.0 - 42.0 %    Monocytes % 6.8 2.0 - 12.0 %    Eosinophils % 2.2 0.0 - 6.0 %    Basophils % 0.2 0.0 - 2.0 %    Neutrophils Absolute 4.75 1.80 - 7.30 E9/L    Immature Granulocytes # 0.02 E9/L    Lymphocytes Absolute 1.01 (L) 1.50 - 4.00 E9/L    Monocytes Absolute 0.43 0.10 - 0.95 E9/L    Eosinophils Absolute 0.14 0.05 - 0.50 E9/L    Basophils Absolute 0.01 0.00 - 0.20 K3/Z   Basic Metabolic Panel w/ Reflex to MG   Result Value Ref Range    Sodium 133 132 - 146 mmol/L    Potassium reflex Magnesium 3.7 3.5 - 5.0 mmol/L    Chloride 94 (L) 98 - 107 mmol/L    CO2 24 22 - 29 mmol/L    Anion Gap 15 7 - 16 mmol/L    Glucose 337 (H) 74 - 99 mg/dL    BUN 39 (H) 6 - 20 mg/dL    CREATININE 7.7 (H) 0.5 - 1.0 mg/dL    GFR Non-African American 7 >=60 mL/min/1.73    GFR African American 7     Calcium 8.4 (L) 8.6 - 10.2 mg/dL   Hepatic Function Panel   Result Value Ref Range    Total Protein 6.5 6.4 - 8.3 g/dL    Albumin 3.1 (L) 3.5 - 5.2 g/dL    Alkaline Phosphatase 127 (H) 35 - 104 U/L    ALT 22 0 - 32 U/L    AST 38 (H) 0 - 31 U/L    Total Bilirubin 0.5 0.0 - 1.2 mg/dL    Bilirubin, Direct 0.3 0.0 - 0.3 mg/dL    Bilirubin, Indirect 0.2 0.0 - 1.0 mg/dL   Troponin Result Value Ref Range    Troponin, High Sensitivity 122 (H) 0 - 9 ng/L   Brain Natriuretic Peptide   Result Value Ref Range    Pro-BNP 34,981 (H) 0 - 125 pg/mL   Troponin   Result Value Ref Range    Troponin, High Sensitivity 120 (H) 0 - 9 ng/L   Blood Gas, Arterial   Result Value Ref Range    Date Analyzed [id number]     Time Analyzed 1355     Source: Blood Arterial     pH, Blood Gas 7.405 7.350 - 7.450    PCO2 38.2 35.0 - 45.0 mmHg    PO2 103.0 (H) 75.0 - 100.0 mmHg    HCO3 23.4 22.0 - 26.0 mmol/L    B.E. -1.1 -3.0 - 3.0 mmol/L    O2 Sat 97.5 92.0 - 98.5 %    O2Hb 93.7 (L) 94.0 - 97.0 %    COHb 3.6 (H) 0.0 - 1.5 %    MetHb 0.3 0.0 - 1.5 %    O2 Content 12.3 mL/dL    HHb 2.4 0.0 - 5.0 %    tHb (est) 9.2 (L) 11.5 - 16.5 g/dL    Mode NC-  2 L     Date Of Collection      Time Collected      Pt Temp 37.0 C     ID [id number]     Lab [id number]     Critical(s) Notified . No Critical Values    hCG, quantitative, pregnancy   Result Value Ref Range    hCG Quant 0.8 <10 mIU/mL   POCT Glucose   Result Value Ref Range    QC OK? yes    POCT Glucose   Result Value Ref Range    QC OK? yes    POCT Glucose   Result Value Ref Range    Meter Glucose 239 (H) 74 - 99 mg/dL   EKG 12 Lead   Result Value Ref Range    Ventricular Rate 79 BPM    Atrial Rate 79 BPM    P-R Interval 136 ms    QRS Duration 66 ms    Q-T Interval 418 ms    QTc Calculation (Bazett) 479 ms    P Axis 41 degrees    R Axis 26 degrees    T Axis 53 degrees       RADIOLOGY:  CT ABDOMEN PELVIS WO CONTRAST Additional Contrast? None   Final Result   1. Ground-glass opacities and septal thickening in the bilateral lung bases   could represent interstitial and alveolar edema as well as atelectasis. However, atypical infection cannot be entirely excluded. 2. Bilateral pleural effusions. 3. Diffuse subcutaneous edema and a small amount of ascites. 4. Cholelithiasis without evidence of acute cholecystitis. 5. Stable diffuse thickening of urinary bladder wall.          CT Head WO Contrast   Final Result   1. No acute intracranial abnormality. 2. There are non-specific white matter changes which could represent small   vessel ischemia with the associated intracranial atherosclerotic disease. 3. Generalized cerebral volume loss. XR CHEST PORTABLE   Final Result   There is bilateral airspace disease which is little changed compared to   01/31/2021. This could represent edema or recurrent infection.               ------------------------- NURSING NOTES AND VITALS REVIEWED ---------------------------  Date / Time Roomed:  3/17/2022 11:45 AM  ED Bed Assignment:  [id number]/18B-[id number]    The nursing notes within the ED encounter and vital signs as below have been reviewed. Patient Vitals for the past 24 hrs:   BP Temp Pulse Resp SpO2   03/17/22 1745 (!) 142/78 -- 90 18 100 %   03/17/22 1600 (!) 152/77 -- 91 15 100 %   03/17/22 1430 (!) 158/74 -- 91 16 95 %   03/17/22 1330 (!) 151/83 -- 88 13 100 %   03/17/22 1245 (!) 141/67 -- 87 16 98 %   03/17/22 1215 139/66 -- 87 14 98 %   03/17/22 1146 (!) 86/46 97 °F (36.1 °C) 83 18 91 %       Oxygen Saturation Interpretation: Normal    ------------------------------------------ PROGRESS NOTES ------------------------------------------      Counseling:  I have spoken with the patient and discussed todays results, in addition to providing specific details for the plan of care and counseling regarding the diagnosis and prognosis. Their questions are answered at this time and they are agreeable with the plan of admission.    --------------------------------- ADDITIONAL PROVIDER NOTES ---------------------------------  Consultations:  Time: 1854. Spoke with [facility]. Discussed case. They will admit the patient.   This patient's ED course included: a personal history and physicial examination, re-evaluation prior to disposition, multiple bedside re-evaluations, IV medications, cardiac monitoring and continuous pulse oximetry    This patient has remained hemodynamically stable during their ED course. Diagnosis:  1. Altered mental status, unspecified altered mental status type        Disposition:  Patient's disposition: Admit to telemetry  Patient's condition is stable.              Carol Carvajal MD  Resident  03/17/22 8945

## 2022-03-18 ENCOUNTER — APPOINTMENT (OUTPATIENT)
Dept: CT IMAGING | Age: 37
End: 2022-03-18
Payer: MEDICARE

## 2022-03-18 PROBLEM — G93.41 METABOLIC ENCEPHALOPATHY: Status: ACTIVE | Noted: 2021-01-27

## 2022-03-18 PROBLEM — D63.1 ANEMIA OF CHRONIC RENAL FAILURE, STAGE 5 (HCC): Status: ACTIVE | Noted: 2022-03-18

## 2022-03-18 PROBLEM — Z86.718 HISTORY OF VENOUS THROMBOEMBOLISM: Status: ACTIVE | Noted: 2022-03-18

## 2022-03-18 PROBLEM — K04.7 DENTAL ABSCESS: Status: ACTIVE | Noted: 2022-03-18

## 2022-03-18 PROBLEM — N18.5 ANEMIA OF CHRONIC RENAL FAILURE, STAGE 5 (HCC): Status: ACTIVE | Noted: 2022-03-18

## 2022-03-18 LAB
ANION GAP SERPL CALCULATED.3IONS-SCNC: 17 MMOL/L (ref 7–16)
BASOPHILS ABSOLUTE: 0.03 E9/L (ref 0–0.2)
BASOPHILS RELATIVE PERCENT: 0.5 % (ref 0–2)
BUN BLDV-MCNC: 54 MG/DL (ref 6–20)
C-REACTIVE PROTEIN: 0.9 MG/DL (ref 0–0.4)
CALCIUM SERPL-MCNC: 8.5 MG/DL (ref 8.6–10.2)
CHLORIDE BLD-SCNC: 91 MMOL/L (ref 98–107)
CO2: 23 MMOL/L (ref 22–29)
CREAT SERPL-MCNC: 8.8 MG/DL (ref 0.5–1)
EOSINOPHILS ABSOLUTE: 0.16 E9/L (ref 0.05–0.5)
EOSINOPHILS RELATIVE PERCENT: 2.5 % (ref 0–6)
FERRITIN: 336 NG/ML
GFR AFRICAN AMERICAN: 6
GFR NON-AFRICAN AMERICAN: 6 ML/MIN/1.73
GLUCOSE BLD-MCNC: 637 MG/DL (ref 74–99)
HBA1C MFR BLD: 9 % (ref 4–5.6)
HCT VFR BLD CALC: 24 % (ref 34–48)
HEMOGLOBIN: 8.1 G/DL (ref 11.5–15.5)
IMMATURE GRANULOCYTES #: 0.03 E9/L
IMMATURE GRANULOCYTES %: 0.5 % (ref 0–5)
IRON SATURATION: 23 % (ref 15–50)
IRON: 43 MCG/DL (ref 37–145)
LYMPHOCYTES ABSOLUTE: 1.29 E9/L (ref 1.5–4)
LYMPHOCYTES RELATIVE PERCENT: 20.4 % (ref 20–42)
MCH RBC QN AUTO: 30.6 PG (ref 26–35)
MCHC RBC AUTO-ENTMCNC: 33.8 % (ref 32–34.5)
MCV RBC AUTO: 90.6 FL (ref 80–99.9)
METER GLUCOSE: 118 MG/DL (ref 74–99)
METER GLUCOSE: 187 MG/DL (ref 74–99)
METER GLUCOSE: 252 MG/DL (ref 74–99)
MONOCYTES ABSOLUTE: 0.57 E9/L (ref 0.1–0.95)
MONOCYTES RELATIVE PERCENT: 9 % (ref 2–12)
NEUTROPHILS ABSOLUTE: 4.25 E9/L (ref 1.8–7.3)
NEUTROPHILS RELATIVE PERCENT: 67.1 % (ref 43–80)
PDW BLD-RTO: 16.6 FL (ref 11.5–15)
PLATELET # BLD: 184 E9/L (ref 130–450)
PMV BLD AUTO: 10.6 FL (ref 7–12)
POTASSIUM REFLEX MAGNESIUM: 4.8 MMOL/L (ref 3.5–5)
PROCALCITONIN: 2.18 NG/ML (ref 0–0.08)
RBC # BLD: 2.65 E12/L (ref 3.5–5.5)
SARS-COV-2, NAAT: NOT DETECTED
SEDIMENTATION RATE, ERYTHROCYTE: 29 MM/HR (ref 0–20)
SODIUM BLD-SCNC: 131 MMOL/L (ref 132–146)
TOTAL IRON BINDING CAPACITY: 187 MCG/DL (ref 250–450)
WBC # BLD: 6.3 E9/L (ref 4.5–11.5)

## 2022-03-18 PROCEDURE — 2580000003 HC RX 258: Performed by: INTERNAL MEDICINE

## 2022-03-18 PROCEDURE — 83550 IRON BINDING TEST: CPT

## 2022-03-18 PROCEDURE — G0378 HOSPITAL OBSERVATION PER HR: HCPCS

## 2022-03-18 PROCEDURE — 90935 HEMODIALYSIS ONE EVALUATION: CPT | Performed by: INTERNAL MEDICINE

## 2022-03-18 PROCEDURE — 85025 COMPLETE CBC W/AUTO DIFF WBC: CPT

## 2022-03-18 PROCEDURE — 87635 SARS-COV-2 COVID-19 AMP PRB: CPT

## 2022-03-18 PROCEDURE — 96365 THER/PROPH/DIAG IV INF INIT: CPT

## 2022-03-18 PROCEDURE — 82962 GLUCOSE BLOOD TEST: CPT

## 2022-03-18 PROCEDURE — 86140 C-REACTIVE PROTEIN: CPT

## 2022-03-18 PROCEDURE — 70486 CT MAXILLOFACIAL W/O DYE: CPT

## 2022-03-18 PROCEDURE — 82728 ASSAY OF FERRITIN: CPT

## 2022-03-18 PROCEDURE — 83036 HEMOGLOBIN GLYCOSYLATED A1C: CPT

## 2022-03-18 PROCEDURE — 83540 ASSAY OF IRON: CPT

## 2022-03-18 PROCEDURE — 6370000000 HC RX 637 (ALT 250 FOR IP): Performed by: INTERNAL MEDICINE

## 2022-03-18 PROCEDURE — 6360000002 HC RX W HCPCS: Performed by: INTERNAL MEDICINE

## 2022-03-18 PROCEDURE — 96376 TX/PRO/DX INJ SAME DRUG ADON: CPT

## 2022-03-18 PROCEDURE — 84145 PROCALCITONIN (PCT): CPT

## 2022-03-18 PROCEDURE — 96375 TX/PRO/DX INJ NEW DRUG ADDON: CPT

## 2022-03-18 PROCEDURE — 85651 RBC SED RATE NONAUTOMATED: CPT

## 2022-03-18 PROCEDURE — 2580000003 HC RX 258: Performed by: FAMILY MEDICINE

## 2022-03-18 PROCEDURE — 6360000002 HC RX W HCPCS: Performed by: FAMILY MEDICINE

## 2022-03-18 PROCEDURE — 6370000000 HC RX 637 (ALT 250 FOR IP): Performed by: FAMILY MEDICINE

## 2022-03-18 PROCEDURE — S5553 INSULIN LONG ACTING 5 U: HCPCS | Performed by: FAMILY MEDICINE

## 2022-03-18 PROCEDURE — 80048 BASIC METABOLIC PNL TOTAL CA: CPT

## 2022-03-18 PROCEDURE — 36415 COLL VENOUS BLD VENIPUNCTURE: CPT

## 2022-03-18 RX ORDER — HYDRALAZINE HYDROCHLORIDE 25 MG/1
25 TABLET, FILM COATED ORAL EVERY 8 HOURS SCHEDULED
Status: DISCONTINUED | OUTPATIENT
Start: 2022-03-18 | End: 2022-03-19 | Stop reason: HOSPADM

## 2022-03-18 RX ADMIN — HYDRALAZINE HYDROCHLORIDE 50 MG: 50 TABLET, FILM COATED ORAL at 00:01

## 2022-03-18 RX ADMIN — HEPARIN SODIUM 5000 UNITS: 10000 INJECTION INTRAVENOUS; SUBCUTANEOUS at 00:01

## 2022-03-18 RX ADMIN — INSULIN LISPRO 1 UNITS: 100 INJECTION, SOLUTION INTRAVENOUS; SUBCUTANEOUS at 20:55

## 2022-03-18 RX ADMIN — SODIUM CHLORIDE, PRESERVATIVE FREE 10 ML: 5 INJECTION INTRAVENOUS at 00:02

## 2022-03-18 RX ADMIN — HYDRALAZINE HYDROCHLORIDE 25 MG: 25 TABLET, FILM COATED ORAL at 20:56

## 2022-03-18 RX ADMIN — INSULIN LISPRO 20 UNITS: 100 INJECTION, SOLUTION INTRAVENOUS; SUBCUTANEOUS at 06:29

## 2022-03-18 RX ADMIN — INSULIN LISPRO 6 UNITS: 100 INJECTION, SOLUTION INTRAVENOUS; SUBCUTANEOUS at 00:00

## 2022-03-18 RX ADMIN — INSULIN GLARGINE-YFGN 17 UNITS: 100 INJECTION, SOLUTION SUBCUTANEOUS at 20:55

## 2022-03-18 RX ADMIN — FAMOTIDINE 10 MG: 20 TABLET ORAL at 09:40

## 2022-03-18 RX ADMIN — SODIUM CHLORIDE, PRESERVATIVE FREE 10 ML: 5 INJECTION INTRAVENOUS at 20:56

## 2022-03-18 RX ADMIN — BUPROPION HYDROCHLORIDE 100 MG: 100 TABLET, EXTENDED RELEASE ORAL at 09:45

## 2022-03-18 RX ADMIN — INSULIN LISPRO 6 UNITS: 100 INJECTION, SOLUTION INTRAVENOUS; SUBCUTANEOUS at 17:55

## 2022-03-18 RX ADMIN — HYDRALAZINE HYDROCHLORIDE 50 MG: 50 TABLET, FILM COATED ORAL at 05:50

## 2022-03-18 RX ADMIN — INSULIN LISPRO 6 UNITS: 100 INJECTION, SOLUTION INTRAVENOUS; SUBCUTANEOUS at 17:57

## 2022-03-18 RX ADMIN — INSULIN GLARGINE-YFGN 17 UNITS: 100 INJECTION, SOLUTION SUBCUTANEOUS at 00:00

## 2022-03-18 RX ADMIN — SODIUM CHLORIDE, PRESERVATIVE FREE 10 ML: 5 INJECTION INTRAVENOUS at 09:39

## 2022-03-18 RX ADMIN — AMPICILLIN SODIUM AND SULBACTAM SODIUM 3000 MG: 2; 1 INJECTION, POWDER, FOR SOLUTION INTRAMUSCULAR; INTRAVENOUS at 11:18

## 2022-03-18 RX ADMIN — HEPARIN SODIUM 5000 UNITS: 10000 INJECTION INTRAVENOUS; SUBCUTANEOUS at 09:40

## 2022-03-18 RX ADMIN — CARVEDILOL 50 MG: 25 TABLET, FILM COATED ORAL at 09:40

## 2022-03-18 ASSESSMENT — PAIN SCALES - GENERAL
PAINLEVEL_OUTOF10: 0

## 2022-03-18 NOTE — CARE COORDINATION
3/18 Care Coordination. Patient was admit from ED on 3/17 d/t being sent in from [facility] during dialysis for BP 80/30. Patient also has facial swelling d/t dental abscess from breaking a tooth. Oral surgery consulted and nephro consulted for dialysis needs. Patient had CT facial bones today revealing worsening of apical lucency in left superior premolar with bony erosion into the left maxillary sinus. Will await recommendations. Patient's PCP is Dr. Nikko Magdaleno and she uses [address] [employer] in [facility]. Patient lives in a 1 story home with 0 LIA with her daughter. Patient has a nebulizer provided through [facility]. She has a history of HHC with [facility] and no history of [facility]. Patient goes to [facility] MWF for HD. Plan is to return home on discharge with no anticipated needs and patient states she will need transportation to [employer] to  her truck. Patient has transportation benefits through her [employer], phone number to reach is [phone].     NOLAN Lopez, RN  [facility] Case Management   Cell: [phone]

## 2022-03-18 NOTE — PLAN OF CARE
Problem: Fluid Volume - Imbalance:  Goal: Absence of imbalanced fluid volume signs and symptoms  Description: Absence of imbalanced fluid volume signs and symptoms  3/18/2022 1051 by Venu Shepard RN  Outcome: Ongoing  3/18/2022 0012 by Sarabjit Toledo RN  Outcome: Ongoing     Problem: Serum Glucose Level - Abnormal:  Goal: Ability to maintain appropriate glucose levels will improve  Description: Ability to maintain appropriate glucose levels will improve  Outcome: Ongoing

## 2022-03-18 NOTE — PROGRESS NOTES
Dr. Eliza Freed, DO,    Your patient is on a medication that requires a renal and/or weight dose adjustment. Renal/Body Weight Function Assessment:    Date Body Weight IBW  Adjusted BW SCr  CrCl Dialysis status   3/17/2022 69.4kg  Patient must be at least 60 in tall to calculate ideal body weight Creatinine clearance cannot be calculated (Unknown ideal weight.) HD       Pharmacy has dose-adjusted the following medication(s):    Date Previous Order Adjusted Order   3/17/2022 Lovenox 40mg daily Heparin 5000U TID       These changes were made per protocol according to the [facility] Renal Dosing Policy/ [facility] Pharmacist Anticoagulant Review. *Please note this dose may need readjusted if your patient's condition changes. Please contact pharmacy with any questions regarding these changes.     Melvin Porras, [facility]  3/17/2022  11:18 PM

## 2022-03-18 NOTE — ED NOTES
[facility] faxed. Verbal report called to [facility]. Pt placed in transport.       Geeta Barcenas RN  03/17/22 3848

## 2022-03-18 NOTE — CONSULTS
Nephrology Consult Note  Patient's Name: Shanta Chan  9:39 AM  3/18/2022        Reason for Consult:  ESRD, HD dependent   Requesting Physician:  Bean Webster DO    Chief Complaint: Altered mental status  History Obtained From:  patient and EHR    History of Present Ilness:    Shanta Chan is a 39 y.o. female with with a history of ESRD HD dependent MWF hypertension, and type 1 diabetes mellitus. Neela Jordan He presented to ED yesterday with complaints of altered mental status. . According to patient he was at her usual hemodialysis treatment yesterday and he wants to end up with treatment she experienced a sensation of feeling hot and does not recall what happened thereafter. Transfer records indicate that patient became hypotensive and was subsequently transferred to ED for evaluation. Patient also complains of increasing facial edema more pronounced on the left side. He has had some dental caries on that side. Initial blood pressure in the ED was 86/46 with approximately 3 and a temperature of 97. Patient was 91% on room air. . She denied chest pain or shortness of breath. Laboratory data was significant for high-sensitivity troponin of 120, WBC 6.4, hemoglobin 7.8, glucose 357, potassium 3.7, BUN 30 noted creatinine of 7.7. A noncontrast CT scan of the head demonstrated no acute intracranial abnormality. A noncontrast CT scan of the abdomen and pelvis revealed thickened urinary bladder wall otherwise no obstruction. Patient was admitted to telemetry for further management.     Past Medical History:   Diagnosis Date    JOLLY (acute kidney injury) ([address] 4/5/2016    AVF (arteriovenous fistula) ([facility] 02/19/2018    let arm    Chronic kidney disease     Dialysis AV fistula malfunction, initial encounter ([address] 11/7/2019    DM type 1 (diabetes mellitus, type 1) ([address]     Encounter regarding vascular access for dialysis for ESRD ([address] 07/12/2018    ESRD (end stage renal disease) ([address]  Hemodialysis patient ([facility] [address]     amrita dawson  fri / graft in left arm    Hypertension     Tobacco abuse 2016       Past Surgical History:   Procedure Laterality Date     SECTION      CYST INCISION AND DRAINAGE  2011    perirectal abscess. [facility]. Dr. Meagan Rivera Left 2019    LEFT ARM FISTULAGRAM, BALLOON ANGIOPLASTY performed by Graciela Magallon MD at [address]      FRACTURE SURGERY      fracture right ulnar, left tibia, and pelvis    OTHER SURGICAL HISTORY      open reduction internal fixation left radial shaft    OTHER SURGICAL HISTORY  2016    pericardial window    OTHER SURGICAL HISTORY  2016     r tesio insertion  / remove 2018    OTHER SURGICAL HISTORY Left 2017    insertion a-v fistula left arm    MA ANASTOMOSIS,AV,ANY SITE Left 2018    REVISION AV FISTULA - LEFT ARM performed by Graciela Magallon MD at [facility] NON-TUNNEL CV CATH N/A 5/10/2018    TESIO CATHETER INSERTION performed by Yovana Burch MD at [address] PRERETINAL MEMBRANE Left 2018    PARS PLANA VITRECTOMY 25 GAUGE, VITREOUS HEMORRHAGE REMOVAL, ENDO LASER, AIR/FLUID  EXCHANGE LEFT EYE performed by Jose Maria Martinez MD at [address] N/A 2021    PERINEAL ABCESS INCISION AND DRAINAGE (LITHOTOMY) performed by Regla Saini MD at [address] [place] [address] History   Problem Relation Age of Onset    Stroke Mother     Cancer Father     Diabetes Paternal Aunt         reports that she has been smoking cigarettes. She has smoked for the past 15.00 years. She has never used smokeless tobacco. She reports previous alcohol use. She reports that she does not use drugs. Allergies:  Patient has no known allergies.     Current Medications:    hydrALAZINE (APRESOLINE) tablet 25 mg, 3 times per day  albuterol (PROVENTIL) nebulizer solution 2.5 mg, Q6H PRN  buPROPion [facility] SR) extended release tablet 100 mg, Q48H  carvedilol (COREG) tablet 50 mg, QAM  sodium chloride flush 0.9 % injection 10 mL, 2 times per day  sodium chloride flush 0.9 % injection 10 mL, PRN  0.9 % sodium chloride infusion, PRN  promethazine (PHENERGAN) tablet 12.5 mg, Q6H PRN   Or  ondansetron (ZOFRAN) injection 4 mg, Q6H PRN  polyethylene glycol (GLYCOLAX) packet 17 g, Daily PRN  acetaminophen (TYLENOL) tablet 650 mg, Q6H PRN   Or  acetaminophen (TYLENOL) suppository 650 mg, Q6H PRN  glucose (GLUTOSE) 40 % oral gel 15 g, PRN  dextrose 50 % IV solution, PRN  glucagon (rDNA) injection 1 mg, PRN  dextrose 5 % solution, PRN  insulin glargine-yfgn (SEMGLEE-YFGN) injection vial 17 Units, Nightly  insulin lispro (HUMALOG) injection vial 0-12 Units, TID WC  insulin lispro (HUMALOG) injection vial 0-6 Units, Nightly  famotidine (PEPCID) tablet 10 mg, QAM  heparin (porcine) injection 5,000 Units, Q8H        Review of Systems:   Pertinent items are noted in HPI. Physical exam:  Vitals:    03/18/22 0930   BP: 130/61   Pulse: 96   Resp: 20   Temp: 98.3 °F (36.8 °C)   SpO2: 93%           General: No distress. Alert. Eyes: PERRL. No sclera icterus. No conjunctival injection. ENT: No discharge. Pharynx clear. Neck: Trachea midline. Normal thyroid. Lungs: No accessory muscle use. No crackles. No wheezing. No rhonchi. CV: Regular rate. Regular rhythm. No murmur or rub. .   Abd: Non-tender. Non-distended. No masses. No organmegaly. Normal bowel sounds. Skin: Warm and dry. No nodule on exposed extremities. No rash on exposed extremities. Ext: No cyanosis, clubbing, edema   Neuro: Awake. Follows commands. Positive pupils/gag/corneals. Normal pain response.       Data:   Labs:  Lab Results   Component Value Date     (L) 03/18/2022     03/17/2022     02/22/2022    K 4.8 03/18/2022    K 3.7 03/17/2022    K 4.7 02/22/2022    CL 91 (L) 03/18/2022    CO2 23 03/18/2022    CO2 24 03/17/2022    CO2 31 (H) 02/22/2022    CREATININE 8.8 (HH) 03/18/2022    CREATININE 7.7 (H) 03/17/2022    CREATININE 8.34 (H) 02/22/2022    BUN 54 (H) 03/18/2022    BUN 39 (H) 03/17/2022    BUN 52 (H) 02/22/2022    GLUCOSE 637 (HH) 03/18/2022    GLUCOSE 337 (H) 03/17/2022    GLUCOSE 114 (H) 02/22/2022    PHOS 6.6 (H) 02/01/2021    PHOS 5.7 (H) 01/29/2021    PHOS 6.0 (H) 11/07/2019    WBC 6.3 03/18/2022    WBC 6.4 03/17/2022    WBC 4.8 02/22/2022    HGB 8.1 (L) 03/18/2022    HGB 7.8 (L) 03/17/2022    HGB 9.3 (L) 02/22/2022    HCT 24.0 (L) 03/18/2022    HCT 22.6 (L) 03/17/2022    HCT 28.5 (L) 02/22/2022    MCV 90.6 03/18/2022     03/18/2022         Imaging:  CT ABDOMEN PELVIS WO CONTRAST Additional Contrast? None    Result Date: 3/17/2022  EXAMINATION: CT OF THE ABDOMEN AND PELVIS WITHOUT CONTRAST 3/17/2022 5:04 pm TECHNIQUE: CT of the abdomen and pelvis was performed without the administration of intravenous contrast. Multiplanar reformatted images are provided for review. Dose modulation, iterative reconstruction, and/or weight based adjustment of the mA/kV was utilized to reduce the radiation dose to as low as reasonably achievable. COMPARISON: CT abdomen and pelvis 05/19/2021. HISTORY: ORDERING SYSTEM PROVIDED HISTORY: abdominal pain, hypotension TECHNOLOGIST PROVIDED HISTORY: Reason for exam:->abdominal pain, hypotension Additional Contrast?->None Decision Support Exception - unselect if not a suspected or confirmed emergency medical condition->Emergency Medical Condition (MA) What reading provider will be dictating this exam?->CRC FINDINGS: There is diffuse subcutaneous edema. There are small bilateral pleural effusions, right greater than left. There is small amount of ascites. There are patchy alveolar opacities in the bilateral lung bases with associated septal thickening.  Evaluation of the solid intra-abdominal organs is limited without administration of intravenous contrast.  However, the liver, spleen, pancreas, and adrenal glands are unremarkable on this unenhanced examination. There is atrophy of the bilateral kidneys. There are probable bilateral renal cysts. There is exophytic lesion from the lower pole of the left kidney which measures approximately 1.4 cm in size and 61 Hounsfield units. This previously measured 25 Hounsfield units on postcontrast examination. This likely represents complicated cyst. There is cholelithiasis without evidence of biliary ductal dilatation. There is no evidence of bowel obstruction or pneumoperitoneum. The uterus and adnexa are grossly unremarkable on this unenhanced examination. There is diffuse thickening of the urinary bladder wall with underdistended urinary bladder. The appendix is unremarkable in appearance. There is arteriosclerosis without abdominal aortic aneurysm. There is screw arthrodesis across the sacroiliac joints. 1. Ground-glass opacities and septal thickening in the bilateral lung bases could represent interstitial and alveolar edema as well as atelectasis. However, atypical infection cannot be entirely excluded. 2. Bilateral pleural effusions. 3. Diffuse subcutaneous edema and a small amount of ascites. 4. Cholelithiasis without evidence of acute cholecystitis. 5. Stable diffuse thickening of urinary bladder wall. CT Head WO Contrast    Result Date: 3/17/2022  EXAMINATION: CT OF THE HEAD WITHOUT CONTRAST  3/17/2022 5:04 pm TECHNIQUE: CT of the head was performed without the administration of intravenous contrast. Dose modulation, iterative reconstruction, and/or weight based adjustment of the mA/kV was utilized to reduce the radiation dose to as low as reasonably achievable. COMPARISON: CT head 04/06/2016 HISTORY: ORDERING SYSTEM PROVIDED HISTORY: ams TECHNOLOGIST PROVIDED HISTORY: Reason for exam:->[facility] Has a \"code stroke\" or \"stroke alert\" been called? ->No Decision Support Exception - unselect if not a suspected or confirmed emergency medical condition->Emergency Medical Condition (MA) What reading provider will be dictating this exam?->CRC FINDINGS: There is mild hypoattenuation within the white matter of the bilateral cerebral hemispheres. There is a focal hypodensity within the left temporal lobe. There is no evidence of mass, mass effect, or midline shift. There is enlargement of the ventricles and sulci suggesting generalized cerebral volume loss for a patient 39years of age. No extra-axial fluid collections or acute hemorrhage. There is no evidence of acute cortical ischemia. The calvarium is intact. There is mucosal thickening within the bilateral maxillary sinuses, left greater than right. There is dehiscence of the left lamina papyracea. There is remote fracture of the left orbital floor. There is left lens replacement. There is intracranial atherosclerotic disease. 1. No acute intracranial abnormality. 2. There are non-specific white matter changes which could represent small vessel ischemia with the associated intracranial atherosclerotic disease. 3. Generalized cerebral volume loss. CT FACIAL BONES WO CONTRAST    Result Date: 3/18/2022  EXAMINATION: CT OF THE FACE WITHOUT CONTRAST  3/18/2022 8:07 am TECHNIQUE: CT of the face was performed without the administration of intravenous contrast. Multiplanar reformatted images are provided for review. Dose modulation, iterative reconstruction, and/or weight based adjustment of the mA/kV was utilized to reduce the radiation dose to as low as reasonably achievable. COMPARISON: None HISTORY: ORDERING SYSTEM PROVIDED HISTORY: Possible dental abscess TECHNOLOGIST PROVIDED HISTORY: Reason for exam:->Possible dental abscess What reading provider will be dictating this exam?->CRC FINDINGS: FACIAL BONES:  There is worsening of periapical lucency identified within the broken superior left upper premolar.   There is now bony erosion through the left maxillary sinus. The remaining facial bones appear intact. ORBITS:  The globes appear intact. The extraocular muscles, optic nerve sheath complexes and lacrimal glands appear unremarkable. No retrobulbar hematoma or mass is seen. The orbital walls and rims are intact. SINUSES/MASTOIDS: Mucosal thickening seen in bilateral paranasal sinuses. The remaining paranasal sinuses and mastoid air cells are well aerated. No acute fracture is seen. SOFT TISSUES:  No appreciable facial soft tissue swelling is seen. Worsening of apical lucency in  left superior premolar with bony erosion into the left maxillary sinus. RECOMMENDATIONS: Unavailable     XR CHEST PORTABLE    Result Date: 3/17/2022  EXAMINATION: ONE XRAY VIEW OF THE CHEST 3/17/2022 12:43 pm COMPARISON: Chest, single view 01/31/2021 HISTORY: ORDERING SYSTEM PROVIDED HISTORY: hypotension TECHNOLOGIST PROVIDED HISTORY: Reason for exam:->hypotension What reading provider will be dictating this exam?->CRC FINDINGS: A single portable AP view of the chest was obtained. There is enlargement of the cardiac silhouette. There is stable widening of the vascular pedicle. There is pulmonary vascular congestion. There are hazy opacities in the bilateral mid to lower lung zones which appear stable compared to the prior examination. No moderate or large pleural fluid collections. No evidence of pneumothorax. There is fixation plate and screws along the right humerus. There is bilateral airspace disease which is little changed compared to 01/31/2021. This could represent edema or recurrent infection. Assessment/Plans    1. Altered mental status most likely due to transient cerebral hypoperfusion from intradialytic hypotension. He appears to be back to baseline with no focal deficits. Continue neurochecks    2. Hypotensive episode during dialysis most likely due to fluid shifts    3.   Possible left-sided dental abscess  For CT scan of facial bones    4. Severe volume overload  Correct with hemodialysis  Fluid and sodium restriction    5.   Type I DM with CKD  Continue basal/bolus insulin     6 ESRD  HD MWF    Will follow  Thanks      Derick Gomez MD  9:39 AM  3/18/2022

## 2022-03-18 NOTE — PROGRESS NOTES
Dr. Stephen Sessions, DO,    Your patient is on a medication that requires a renal and/or weight dose adjustment. Renal/Body Weight Function Assessment:    Date Body Weight IBW  Adjusted BW SCr  CrCl Dialysis status   3/17/2022 69.4kg  Patient must be at least 60 in tall to calculate ideal body weight Creatinine clearance cannot be calculated (Unknown ideal weight.) HD       Pharmacy has dose-adjusted the following medication(s):    Date Previous Order Adjusted Order   3/17/2022 Famotidine 20mg daily Famotidine 10mg daily       These changes were made per protocol according to the [facility] Renal Dosing Policy/ [facility] Pharmacist Anticoagulant Review. *Please note this dose may need readjusted if your patient's condition changes. Please contact pharmacy with any questions regarding these changes.     Rishi Perales, [address] [place] [address]  3/17/2022  11:14 PM

## 2022-03-18 NOTE — CONSULTS
Progress Note  Date: Patient's Name/Date of Birth: Funmilayo Blanc / 1985 (89 y.o.)    Date: March 18, 2022      Subjective:   Patient presented to emergency department 3/17/22 from her dialysis appointment with complaint chest and abdominal pain, she was hypotensive. During admission patient also complained of upper left facial swelling, which she states occurred secondarily to the fracture of her tooth in that area.      Current Facility-Administered Medications   Medication Dose Route Frequency Provider Last Rate Last Admin    hydrALAZINE (APRESOLINE) tablet 25 mg  25 mg Oral 3 times per day Byram Billet, DO        ampicillin-sulbactam (UNASYN) 3000 mg ivpb minibag  3,000 mg IntraVENous Q24H Maren Teixeira MD   Stopped at 03/18/22 1150    insulin lispro (HUMALOG) injection vial 6 Units  6 Units SubCUTAneous TID WC Maren Teixeira MD   6 Units at 03/18/22 1755    albuterol (PROVENTIL) nebulizer solution 2.5 mg  2.5 mg Nebulization Q6H PRN Byram Billet, DO        buPROPion [facility]) extended release tablet 100 mg  100 mg Oral Q48H Byram Billet, DO   100 mg at 03/18/22 0945    carvedilol (COREG) tablet 50 mg  50 mg Oral QAM Byram Billet, DO   50 mg at 03/18/22 0940    sodium chloride flush 0.9 % injection 10 mL  10 mL IntraVENous 2 times per day Byram Billet, DO   10 mL at 03/18/22 0939    sodium chloride flush 0.9 % injection 10 mL  10 mL IntraVENous PRN Byram Billet, DO        0.9 % sodium chloride infusion  25 mL IntraVENous PRN Byram Billet, DO        promethazine (PHENERGAN) tablet 12.5 mg  12.5 mg Oral Q6H PRN Byram Billet, DO        Or    ondansetron [facility]) injection 4 mg  4 mg IntraVENous Q6H PRN Byram Billet, DO        polyethylene glycol (GLYCOLAX) packet 17 g  17 g Oral Daily PRN Byram Billet, DO        acetaminophen (TYLENOL) tablet 650 mg  650 mg Oral Q6H PRN Byram Billet, DO        Or    acetaminophen (TYLENOL) suppository 650 mg  650 mg Rectal Q6H PRN Cecilio Copier, DO        glucose (GLUTOSE) 40 % oral gel 15 g  15 g Oral PRN Cecilio Copier, DO        dextrose 50 % IV solution  12.5 g IntraVENous PRN Cecilio Copier, DO        glucagon (rDNA) injection 1 mg  1 mg IntraMUSCular PRN Cecilio Copier, DO        dextrose 5 % solution  100 mL/hr IntraVENous PRN Cecilio Copier, DO        insulin glargine-yfgn [facility]) injection vial 17 Units  0.25 Units/kg SubCUTAneous Nightly Cecilio Copier, DO   17 Units at 03/18/22 0000    insulin lispro (HUMALOG) injection vial 0-12 Units  0-12 Units SubCUTAneous TID WC Cecilio Copier, DO   6 Units at 03/18/22 1757    insulin lispro (HUMALOG) injection vial 0-6 Units  0-6 Units SubCUTAneous Nightly Cecilio Copier, DO   6 Units at 03/18/22 0000    famotidine (PEPCID) tablet 10 mg  10 mg Oral QAM Cecilio Copier, DO   10 mg at 03/18/22 0940    heparin (porcine) injection 5,000 Units  5,000 Units SubCUTAneous Q8H Cecilio Copier, DO   5,000 Units at 03/18/22 0940       No Known Allergies    Objective:  Vitals:    03/18/22 1530 03/18/22 1600 03/18/22 1630 03/18/22 1658   BP: (!) 151/76 138/71 138/62 126/62   Pulse: 84 85 87 87   Resp:    20   Temp:    97.3 °F (36.3 °C)   TempSrc:       SpO2:       Weight:    159 lb 13.3 oz (72.5 kg)       Intra Oral Exam reveals: mild swelling of the upper left buccal mucosa in the area of her upper left premolars. Swelling is slightly firm to the touch and sensitive to palpation, measuring approximately 1x2 cm in diameter. The site of tooth #13 reveals gingival overgrowth surrounding a remaining root tip, no coronal tooth structure remains.   Extra Oral Exam reveals: slight facial asymmetry of the patient's right and left upper cheeks near her maxilla     Assessment:  Principal Problem:    Metabolic encephalopathy  Active Problems:    ESRD (end stage renal disease) on dialysis (HCC)    HTN (hypertension), benign    Uncontrolled type 1 diabetes mellitus with hyperglycemia ([address] Hypotension    History of venous thromboembolism    Dental abscess    Anemia of chronic renal failure, stage 5 ([facility])  Resolved Problems:    * No resolved hospital problems. *    Patient diagnosed with: Non-restorable root tip of tooth #13 with acute apical abscess    Plan:  1. Treatment Plan:  1. Incision and drainage of swelling   2. Extraction of root tip #13 (upper left second premolar)    2. Treatment Provided:   1. Discussed risks and benefits of procedure with patient and obtained consent  2. Anesthetized patient with 1.7mL of 2% Lidocaine 1:100K epinephrine   3. Completed incision and drainage of swelling in the upper left buccal vestibule     Area is expected to drain. If significant bleeding occurs, hold gauze with firm pressure for 15 min to achieve hemostasis. 3. Discharge Instructions:   1. Patient is to be discharged on Augmentin (pending renal protocol) and is to rinse with peridex twice daily   2. Patient is to report to dental clinic ([facility], [phone]) ASAP on Tuesday 3/22/22 for the extraction of root tip #13 (due to dialysis appointment Monday 3/21/22)  3. Patient was instructed that if she feels swelling continues to worsen and begins to expand near her orbit, that she should report to the ED to be seen by the on call dental resident    4. Prescribed Medications:   1. Chlorohexidine Gluconate 0.12% (Peridex)  2.  Augmentin     Electronically signed by Kathryn Buck DDS on 3/18/22 at 7:25 PM EDT

## 2022-03-18 NOTE — H&P
Hospitalist History & Physical      PCP: Jacques Verduzco DO    Date of Service: Pt seen/examined on 3/17/2022     Chief Complaint:  had concerns including Hypotension (PT WAS AT DIALYSIS HAD 1HR OF [address] BP OF 80/30). History Of Present Illness:    Ms. Ghanshyam Arias, a 39y.o. year old female  who  has a past medical history of JOLLY (acute kidney injury) ([facility] AVF (arteriovenous fistula) ([facility] Chronic kidney disease, Dialysis AV fistula malfunction, initial encounter ([facility] DM type 1 (diabetes mellitus, type 1) ([facility] Encounter regarding vascular access for dialysis for ESRD ([facility] ESRD (end stage renal disease) ([facility] Hemodialysis patient ([facility] Hypertension, and Tobacco abuse. Patient presented to the emergency department from dialysis with low blood pressure. She is also reporting some abdominal and chest pain. Noted to have facial swelling. Initial vital signs show the patient with a blood pressure of 86/46. Currently she is 142/76 after IV fluids. Laboratory studies demonstrate creatinine 7.7, BUN 39, glucose 337, troponin of 122, proBNP 34,981, hemoglobin 7.8. CT head unremarkable. CT abdomen pelvis shows groundglass opacities and septal thickening in the bilateral lung bases which could represent interstitial and alveolar edema as well as atelectasis. Bilateral pleural effusions. Diffuse subcutaneous edema and small amount of ascites. Patient also reporting left facial swelling that she says secondary to a dental abscess that happened after she broke her tooth. This has not been treated at this point. Medicine consulted for admission.     Past Medical History:   Diagnosis Date    JOLLY (acute kidney injury) ([facility] 4/5/2016    AVF (arteriovenous fistula) ([facility] 02/19/2018    let arm    Chronic kidney disease     Dialysis AV fistula malfunction, initial encounter ([facility] 11/7/2019    DM type 1 (diabetes mellitus, type 1) ([facility]     Encounter regarding vascular access for dialysis for ESRD [facility]) 2018    ESRD (end stage renal disease) ([facility]     Hemodialysis patient ([facility]     amrita dawson  / graft in left arm    Hypertension     Tobacco abuse 2016       Past Surgical History:   Procedure Laterality Date     SECTION      CYST INCISION AND DRAINAGE  2011    perirectal abscess. [facility]. Dr. Etienne Mcfadden Left 2019    LEFT ARM FISTULAGRAM, BALLOON ANGIOPLASTY performed by Anneliese Trejo MD at [facility]      FRACTURE SURGERY      fracture right ulnar, left tibia, and pelvis    OTHER SURGICAL HISTORY      open reduction internal fixation left radial shaft    OTHER SURGICAL HISTORY  2016    pericardial window    OTHER SURGICAL HISTORY  2016     r tesio insertion  / remove 2018    OTHER SURGICAL HISTORY Left 2017    insertion a-v fistula left arm    MT ANASTOMOSIS,AV,ANY SITE Left 2018    REVISION AV FISTULA - LEFT ARM performed by Anneliese Trejo MD at [facility] NON-TUNNEL CV CATH N/A 5/10/2018    TESIO CATHETER INSERTION performed by Albert Emanuel MD at [address] PRERETINAL MEMBRANE Left 2018    PARS PLANA VITRECTOMY 25 GAUGE, VITREOUS HEMORRHAGE REMOVAL, ENDO LASER, AIR/FLUID  EXCHANGE LEFT EYE performed by Juan Joes Soto MD at [address] N/A 2021    PERINEAL ABCESS INCISION AND DRAINAGE (LITHOTOMY) performed by Guadalupe Black MD at [address] [place] [address]       Prior to Admission medications    Medication Sig Start Date End Date Taking? Authorizing Provider   albuterol (PROVENTIL) (2.5 MG/3ML) 0.083% nebulizer solution Take 3 mLs by nebulization every 6 hours as needed for Wheezing or Shortness of Breath 3/15/22 4/14/22  Theopolis Stockton, DO   clindamycin (CLEOCIN T) 1 % lotion Apply topically 2 times daily.  3/15/22 4/14/22 MD         Allergies:  Patient has no known allergies. Social History:    TOBACCO:   reports that she has been smoking cigarettes. She has smoked for the past 15.00 years. She has never used smokeless tobacco.  ETOH:   reports previous alcohol use. Family History:    Reviewed in detail and negative for DM, CAD, Cancer, CVA. Positive as follows\"      Problem Relation Age of Onset    Stroke Mother     Cancer Father     Diabetes Paternal Aunt        REVIEW OF SYSTEMS:   Pertinent positives as noted in the HPI. All other systems reviewed and negative. PHYSICAL EXAM:  BP (!) 142/76   Pulse 90   Temp 97 °F (36.1 °C)   Resp 14   SpO2 100%   General appearance: No apparent distress, appears stated age and cooperative. HEENT: Normal cephalic, atraumatic without obvious deformity. Periorbital edema. Facial swelling left worse than right. Possibly 2/2 dental abscess  Neck: Supple, with full range of motion. No jugular venous distention. Trachea midline. Respiratory: Clear to auscultation bilaterally  Cardiovascular: Regular rate and rhythm  Abdomen: Soft, distended, diffuse TTP  Musculoskeletal: No clubbing, cyanosis, edema of bilateral lower extremities. Brisk capillary refill. Skin: Normal skin color. No rashes or lesions. Neurologic:  Neurovascularly intact without any focal sensory/motor deficits. Cranial nerves: II-XII intact, grossly non-focal.  Psychiatric: Alert and oriented, thought content appropriate, normal insight    Reviewed EKG and CXR personally      CBC:   Recent Labs     03/17/22  1200   WBC 6.4   RBC 2.55*   HGB 7.8*   HCT 22.6*   MCV 88.6   RDW 16.5*        BMP:   Recent Labs     03/17/22  1200      K 3.7   CL 94*   CO2 24   BUN 39*   CREATININE 7.7*     LFT:  Recent Labs     03/17/22  1200   PROT 6.5   ALKPHOS 127*   ALT 22   AST 38*   BILITOT 0.5     CE:  No results for input(s): Altaf Min in the last 72 hours.   PT/INR: No results for input(s): INR, APTT in the last 72 hours. BNP: No results for input(s): BNP in the last 72 hours. ESR:   Lab Results   Component Value Date    SEDRATE 83 (H) 11/28/2018     CRP:   Lab Results   Component Value Date    CRP 0.1 11/05/2019     D Dimer:   Lab Results   Component Value Date    DDIMER 279 08/16/2016      Folate and B12:   Lab Results   Component Value Date    NETXREKF23 811 01/27/2021   ,   Lab Results   Component Value Date    FOLATE 19.4 01/27/2021     Lactic Acid:   Lab Results   Component Value Date    LACTA 1.8 05/19/2021     Thyroid Studies:   Lab Results   Component Value Date    TSH 3.850 01/27/2021       Oupatient labs:  Lab Results   Component Value Date    CHOL 155 10/08/2013    TRIG 68 10/08/2013    HDL 40 01/29/2021    LDLCALC 41 01/29/2021    TSH 3.850 01/27/2021    INR 1.1 11/26/2018    LABA1C 8.8 08/17/2021       Urinalysis:    Lab Results   Component Value Date    NITRU Negative 11/23/2017    WBCUA 2-5 11/23/2017    WBCUA NONE 11/21/2010    BACTERIA RARE 11/23/2017    RBCUA 5-10 11/23/2017    RBCUA 0-1 09/29/2013    BLOODU MODERATE 11/23/2017    SPECGRAV 1.015 11/23/2017    GLUCOSEU 500 11/23/2017    GLUCOSEU >=1000 11/21/2010       Imaging:  CT ABDOMEN PELVIS WO CONTRAST Additional Contrast? None    Result Date: 3/17/2022  EXAMINATION: CT OF THE ABDOMEN AND PELVIS WITHOUT CONTRAST 3/17/2022 5:04 pm TECHNIQUE: CT of the abdomen and pelvis was performed without the administration of intravenous contrast. Multiplanar reformatted images are provided for review. Dose modulation, iterative reconstruction, and/or weight based adjustment of the mA/kV was utilized to reduce the radiation dose to as low as reasonably achievable. COMPARISON: CT abdomen and pelvis 05/19/2021.  HISTORY: ORDERING SYSTEM PROVIDED HISTORY: abdominal pain, hypotension TECHNOLOGIST PROVIDED HISTORY: Reason for exam:->abdominal pain, hypotension Additional Contrast?->None Decision Support Exception - unselect if not a suspected or confirmed emergency medical condition->Emergency Medical Condition (MA) What reading provider will be dictating this exam?->CRC FINDINGS: There is diffuse subcutaneous edema. There are small bilateral pleural effusions, right greater than left. There is small amount of ascites. There are patchy alveolar opacities in the bilateral lung bases with associated septal thickening. Evaluation of the solid intra-abdominal organs is limited without administration of intravenous contrast.  However, the liver, spleen, pancreas, and adrenal glands are unremarkable on this unenhanced examination. There is atrophy of the bilateral kidneys. There are probable bilateral renal cysts. There is exophytic lesion from the lower pole of the left kidney which measures approximately 1.4 cm in size and 61 Hounsfield units. This previously measured 25 Hounsfield units on postcontrast examination. This likely represents complicated cyst. There is cholelithiasis without evidence of biliary ductal dilatation. There is no evidence of bowel obstruction or pneumoperitoneum. The uterus and adnexa are grossly unremarkable on this unenhanced examination. There is diffuse thickening of the urinary bladder wall with underdistended urinary bladder. The appendix is unremarkable in appearance. There is arteriosclerosis without abdominal aortic aneurysm. There is screw arthrodesis across the sacroiliac joints. 1. Ground-glass opacities and septal thickening in the bilateral lung bases could represent interstitial and alveolar edema as well as atelectasis. However, atypical infection cannot be entirely excluded. 2. Bilateral pleural effusions. 3. Diffuse subcutaneous edema and a small amount of ascites. 4. Cholelithiasis without evidence of acute cholecystitis. 5. Stable diffuse thickening of urinary bladder wall.      CT Head WO Contrast    Result Date: 3/17/2022  EXAMINATION: CT OF THE HEAD WITHOUT CONTRAST  3/17/2022 5:04 pm TECHNIQUE: CT of the head was performed without the administration of intravenous contrast. Dose modulation, iterative reconstruction, and/or weight based adjustment of the mA/kV was utilized to reduce the radiation dose to as low as reasonably achievable. COMPARISON: CT head 04/06/2016 HISTORY: ORDERING SYSTEM PROVIDED HISTORY: ams TECHNOLOGIST PROVIDED HISTORY: Reason for exam:->ams Has a \"code stroke\" or \"stroke alert\" been called? ->No Decision Support Exception - unselect if not a suspected or confirmed emergency medical condition->Emergency Medical Condition (MA) What reading provider will be dictating this exam?->CRC FINDINGS: There is mild hypoattenuation within the white matter of the bilateral cerebral hemispheres. There is a focal hypodensity within the left temporal lobe. There is no evidence of mass, mass effect, or midline shift. There is enlargement of the ventricles and sulci suggesting generalized cerebral volume loss for a patient 39years of age. No extra-axial fluid collections or acute hemorrhage. There is no evidence of acute cortical ischemia. The calvarium is intact. There is mucosal thickening within the bilateral maxillary sinuses, left greater than right. There is dehiscence of the left lamina papyracea. There is remote fracture of the left orbital floor. There is left lens replacement. There is intracranial atherosclerotic disease. 1. No acute intracranial abnormality. 2. There are non-specific white matter changes which could represent small vessel ischemia with the associated intracranial atherosclerotic disease. 3. Generalized cerebral volume loss.      XR CHEST PORTABLE    Result Date: 3/17/2022  EXAMINATION: ONE XRAY VIEW OF THE CHEST 3/17/2022 12:43 pm COMPARISON: Chest, single view 01/31/2021 HISTORY: ORDERING SYSTEM PROVIDED HISTORY: hypotension TECHNOLOGIST PROVIDED HISTORY: Reason for exam:->hypotension What reading provider will be dictating this exam?->CRC FINDINGS: A single portable AP view of the chest was obtained. There is enlargement of the cardiac silhouette. There is stable widening of the vascular pedicle. There is pulmonary vascular congestion. There are hazy opacities in the bilateral mid to lower lung zones which appear stable compared to the prior examination. No moderate or large pleural fluid collections. No evidence of pneumothorax. There is fixation plate and screws along the right humerus. There is bilateral airspace disease which is little changed compared to 01/31/2021. This could represent edema or recurrent infection. XR CHEST PORTABLE    Result Date: 2/22/2022  Patient Name:                Celsa Espinoza MRN:                         [id number] Acct#:                       [id number] Diagnostic Radiology ACCESSION                 EXAM [id number]             2/22/2022 19:33 EST      CR Chest Portable         MD KATRINA, JOSEPH IBARRA CPT code 95633 Reason For Exam (CR Chest Portable) sob Report Chest one view HISTORY: Short of breath The bilateral mid to lower lung infiltrates. Heart and pulmonary vessels are normal. No pleural effusions. IMPRESSION: Bilateral mid to lower lung infiltrates.  Report Dictated on Workstation: [id number] ---** Final ---** Dictated: 02/22/2022 7:38 pm Dictating Physician: MD REAL MALAY Signed Date and Time: 02/22/2022 7:39 pm Signed by: MD REAL MALAY Transcribed Date and Time: 02/22/2022 7:38      ASSESSMENT:  -Hypotension, resolving  -Dental abscess  -Elevated troponin in setting of ESRD  -ESRD on hemodialysis  -Type 2 diabetes  -Chronic anemia      PLAN:  -Admit to medicine  -Consult nephrology  -Consult oral surgery  -CT facial bones  -Monitor blood pressure  -Hemodialysis per nephrology guidance  -Medium dose correction insulin  -Continue home medications        Diet: No diet orders on file  Code Status: Prior  Surrogate decision maker confirmed with patient:   Extended Emergency Contact Information  Primary Emergency Contact: Paul Conteh [address] Phone: [phone]  Relation: Other  Secondary Emergency Contact: Shanell Da Silva Della Jignesh [address] Phone: [phone]  Relation: Brother/Sister    DVT Prophylaxis: []Lovenox []Heparin []PCD [] [address] []Encouraged ambulation  Disposition: []Med/Surg [] Intermediate [] ICU/CCU  Admit status: [] Observation [] Inpatient     +++++++++++++++++++++++++++++++++++++++++++++++++  Tashi Verma DO  +++++++++++++++++++++++++++++++++++++++++++++++++  NOTE: This report was transcribed using voice recognition software. Every effort was made to ensure accuracy; however, inadvertent computerized transcription errors may be present.

## 2022-03-18 NOTE — PROGRESS NOTES
Hospitalist Progress Note      Synopsis: Patient admitted on 3/17/2022 39y.o. year old female  who  has a past medical history of JOLLY (acute kidney injury) ([facility] AVF (arteriovenous fistula) ([facility] Chronic kidney disease, Dialysis AV fistula malfunction, initial encounter ([facility] DM type 1 (diabetes mellitus, type 1) ([facility] Encounter regarding vascular access for dialysis for ESRD ([facility] ESRD (end stage renal disease) ([facility] Hemodialysis patient ([facility] Hypertension, and Tobacco abuse.      Patient presented to the emergency department from dialysis with low blood pressure. She is also reporting some abdominal and chest pain. Noted to have facial swelling. Initial vital signs show the patient with a blood pressure of 86/46. Currently she is 142/76 after IV fluids. Laboratory studies demonstrate creatinine 7.7, BUN 39, glucose 337, troponin of 122, proBNP 34,981, hemoglobin 7.8. CT head unremarkable. CT abdomen pelvis shows groundglass opacities and septal thickening in the bilateral lung bases which could represent interstitial and alveolar edema as well as atelectasis. Bilateral pleural effusions. Diffuse subcutaneous edema and small amount of ascites. Patient also reporting left facial swelling that she says secondary to a dental abscess that happened after she broke her tooth. This has not been treated at this point. Medicine consulted for admission.       Subjective    Clinically improving. Feeling better. Stable overnight. No other overnight issues reported. No CP, SOB, palpitations, blurred vision, HA, lightheadedness, LOC or focal neurological deficits    Exam:  /61   Pulse 96   Temp 98.3 °F (36.8 °C) (Oral)   Resp 20   SpO2 93%   General appearance: No apparent distress, appears stated age and cooperative. HEENT: Pupils equal, round, and reactive to light. Conjunctivae/corneas clear. Neck: Supple. No jugular venous distention. Trachea midline.   Respiratory:  Normal respiratory effort. Clear to auscultation, bilaterally without Rales/Wheezes/Rhonchi. Cardiovascular: Regular rate and rhythm with normal S1/S2 without murmurs, rubs or gallops. Abdomen: Soft, non-tender, non-distended with normal bowel sounds. Musculoskeletal: No clubbing, cyanosis or edema bilaterally. Brisk capillary refill. 2+ lower extremity pulses (dorsalis pedis). Skin:  No rashes    Neurologic: awake, alert and following commands     Medications:  Reviewed    Infusion Medications    sodium chloride      dextrose       Scheduled Medications    hydrALAZINE  25 mg Oral 3 times per day    buPROPion  100 mg Oral Q48H    carvedilol  50 mg Oral QAM    sodium chloride flush  10 mL IntraVENous 2 times per day    insulin glargine  0.25 Units/kg SubCUTAneous Nightly    insulin lispro  0-12 Units SubCUTAneous TID WC    insulin lispro  0-6 Units SubCUTAneous Nightly    famotidine  10 mg Oral QAM    heparin (porcine)  5,000 Units SubCUTAneous Q8H     PRN Meds: albuterol, sodium chloride flush, sodium chloride, promethazine **OR** ondansetron, polyethylene glycol, acetaminophen **OR** acetaminophen, glucose, dextrose, glucagon (rDNA), dextrose    I/O    Intake/Output Summary (Last 24 hours) at 3/18/2022 1050  Last data filed at 3/18/2022 0013  Gross per 24 hour   Intake 300 ml   Output --   Net 300 ml       Labs:   Recent Labs     03/17/22  1200 03/18/22  0445   WBC 6.4 6.3   HGB 7.8* 8.1*   HCT 22.6* 24.0*    184       Recent Labs     03/17/22  1200 03/18/22  0445    131*   K 3.7 4.8   CL 94* 91*   CO2 24 23   BUN 39* 54*   CREATININE 7.7* 8.8*   CALCIUM 8.4* 8.5*       Recent Labs     03/17/22  1200   PROT 6.5   ALKPHOS 127*   ALT 22   AST 38*   BILITOT 0.5       No results for input(s): INR in the last 72 hours. No results for input(s): Simone Peace in the last 72 hours.     Chronic labs:  Lab Results   Component Value Date    CHOL 155 10/08/2013    TRIG 68 10/08/2013    HDL 40 01/29/2021    LDLCALC 41 01/29/2021    TSH 3.850 01/27/2021    INR 1.1 11/26/2018    LABA1C 9.0 (H) 03/18/2022       Radiology:  Imaging studies reviewed today. ASSESSMENT:    Principal Problem:    Metabolic encephalopathy  Active Problems:    ESRD (end stage renal disease) on dialysis (HCC)    HTN (hypertension), benign    Uncontrolled type 1 diabetes mellitus with hyperglycemia (HCC)    Hypotension    History of venous thromboembolism    Dental abscess    Anemia of chronic renal failure, stage 5 (HCC)  Resolved Problems:    * No resolved hospital problems. *     -PLAN:    Dental abscess   evident on CT face  IV Unasyn renally dosed  Blood cultures  Check Covid swab  Procalcitonin, CRP, ESR  Lactic acid normal at 1.9  WBC normal at 6.3  Afebrile    Hypotension transient  Resolved after IVF  Monitor  Lactate 1.9    Hypertension of CKD  Continue antihypertensives  Monitor    DM 1-uncontrolled  Lantus, prandial insulin, sliding scale  MBS  A1c 9.8    ESRD  IHD  Nephro consult    Anemia of CKD  Check iron studies  Retacrit  Monitor  Transfuse for hemoglobin less than 7    Metabolic encephalopathy  Multifactorial-infection, uremia  Treat underlying conditions  Avoid circadian disruption  Avoid anticholinergics and sedating medications  Reorient to time place and situation frequently  Monitor neurological status closely  Evaluate for fecal or urinary retention  Attempt to re-orient to normal sleep-wake cycle ie up in chair, lights on, shades up during day. Quiet and dark at night       Diet: ADULT DIET; Regular; 3 carb choices (45 gm/meal)  Code Status: Full Code  PT/OT Eval Status:   As needed  DVT Prophylaxis:   Heparin  Recommended disposition at discharge:   TBD    +++++++++++++++++++++++++++++++++++++++++++++++++  Nabila Maradiaga MD   [facility].  +++++++++++++++++++++++++++++++++++++++++++++++++  NOTE: This report was transcribed using voice recognition software.  Every effort was made to ensure accuracy; however, inadvertent computerized transcription errors may be present.

## 2022-03-18 NOTE — FLOWSHEET NOTE
03/18/22 1658   Vital Signs   /62   Temp 97.3 °F (36.3 °C)   Pulse 87   Resp 20   Weight 159 lb 13.3 oz (72.5 kg)   Percent Weight Change -2.95   Pain Assessment   Pain Assessment 0-10   Post-Hemodialysis Assessment   Post-Treatment Procedures Blood returned; Access bleeding time < 10 minutes   Machine Disinfection Process Acid/Vinegar Clean;Heat Disinfect; Exterior Machine Disinfection   Rinseback Volume (ml) 300 ml   Total Liters Processed (l/min) 78.8 l/min   Dialyzer Clearance Moderately streaked   Duration of Treatment (minutes) 210 minutes   Hemodialysis Intake (ml) 300 ml   Hemodialysis Output (ml) 2500 ml   NET Removed (ml) 2200 ml   Tolerated Treatment Good   Patient Response to Treatment tolerated well   Bilateral Breath Sounds Clear   Edema Right upper extremity; Left upper extremity;Right lower extremity; Left lower extremity   RUE Edema +2   LUE Edema +2   RLE Edema +2   LLE Edema +2

## 2022-03-19 VITALS
BODY MASS INDEX: 31.08 KG/M2 | SYSTOLIC BLOOD PRESSURE: 145 MMHG | WEIGHT: 153.88 LBS | HEART RATE: 92 BPM | RESPIRATION RATE: 18 BRPM | TEMPERATURE: 98.3 F | DIASTOLIC BLOOD PRESSURE: 63 MMHG | OXYGEN SATURATION: 97 %

## 2022-03-19 LAB
METER GLUCOSE: 102 MG/DL (ref 74–99)
METER GLUCOSE: 121 MG/DL (ref 74–99)
METER GLUCOSE: 48 MG/DL (ref 74–99)

## 2022-03-19 PROCEDURE — 96375 TX/PRO/DX INJ NEW DRUG ADDON: CPT

## 2022-03-19 PROCEDURE — 6360000002 HC RX W HCPCS: Performed by: INTERNAL MEDICINE

## 2022-03-19 PROCEDURE — G0378 HOSPITAL OBSERVATION PER HR: HCPCS

## 2022-03-19 PROCEDURE — 6360000002 HC RX W HCPCS: Performed by: FAMILY MEDICINE

## 2022-03-19 PROCEDURE — 2500000003 HC RX 250 WO HCPCS: Performed by: FAMILY MEDICINE

## 2022-03-19 PROCEDURE — 6370000000 HC RX 637 (ALT 250 FOR IP): Performed by: FAMILY MEDICINE

## 2022-03-19 PROCEDURE — 2580000003 HC RX 258: Performed by: INTERNAL MEDICINE

## 2022-03-19 PROCEDURE — 90935 HEMODIALYSIS ONE EVALUATION: CPT | Performed by: INTERNAL MEDICINE

## 2022-03-19 PROCEDURE — 96366 THER/PROPH/DIAG IV INF ADDON: CPT

## 2022-03-19 PROCEDURE — 82962 GLUCOSE BLOOD TEST: CPT

## 2022-03-19 PROCEDURE — 96376 TX/PRO/DX INJ SAME DRUG ADON: CPT

## 2022-03-19 RX ORDER — AMOXICILLIN AND CLAVULANATE POTASSIUM 500; 125 MG/1; MG/1
1 TABLET, FILM COATED ORAL EVERY 24 HOURS
Qty: 10 TABLET | Refills: 0 | Status: SHIPPED | OUTPATIENT
Start: 2022-03-19 | End: 2022-03-29

## 2022-03-19 RX ORDER — CHLORHEXIDINE GLUCONATE 0.12 MG/ML
15 RINSE ORAL 2 TIMES DAILY
Qty: 420 ML | Refills: 0 | Status: SHIPPED | OUTPATIENT
Start: 2022-03-19 | End: 2022-04-02

## 2022-03-19 RX ADMIN — AMPICILLIN SODIUM AND SULBACTAM SODIUM 3000 MG: 2; 1 INJECTION, POWDER, FOR SOLUTION INTRAMUSCULAR; INTRAVENOUS at 10:32

## 2022-03-19 RX ADMIN — FAMOTIDINE 10 MG: 20 TABLET ORAL at 10:31

## 2022-03-19 RX ADMIN — DEXTROSE MONOHYDRATE 12.5 G: 25 INJECTION, SOLUTION INTRAVENOUS at 10:28

## 2022-03-19 RX ADMIN — HEPARIN SODIUM 5000 UNITS: 10000 INJECTION INTRAVENOUS; SUBCUTANEOUS at 10:36

## 2022-03-19 RX ADMIN — CARVEDILOL 50 MG: 25 TABLET, FILM COATED ORAL at 10:32

## 2022-03-19 ASSESSMENT — PAIN SCALES - GENERAL
PAINLEVEL_OUTOF10: 0

## 2022-03-19 NOTE — PROGRESS NOTES
Hospitalist Progress Note      Synopsis: Patient admitted on 3/17/2022 39y.o. year old female  who  has a past medical history of JOLLY (acute kidney injury) ([facility] AVF (arteriovenous fistula) ([facility] Chronic kidney disease, Dialysis AV fistula malfunction, initial encounter ([facility] DM type 1 (diabetes mellitus, type 1) ([facility] Encounter regarding vascular access for dialysis for ESRD ([facility] ESRD (end stage renal disease) ([facility] Hemodialysis patient ([facility] Hypertension, and Tobacco abuse.      Patient presented to the emergency department from dialysis with low blood pressure. She is also reporting some abdominal and chest pain. Noted to have facial swelling. Initial vital signs show the patient with a blood pressure of 86/46. Currently she is 142/76 after IV fluids. Laboratory studies demonstrate creatinine 7.7, BUN 39, glucose 337, troponin of 122, proBNP 34,981, hemoglobin 7.8. CT head unremarkable. CT abdomen pelvis shows groundglass opacities and septal thickening in the bilateral lung bases which could represent interstitial and alveolar edema as well as atelectasis. Bilateral pleural effusions. Diffuse subcutaneous edema and small amount of ascites. Patient also reporting left facial swelling that she says secondary to a dental abscess that happened after she broke her tooth. This has not been treated at this point. Medicine consulted for admission.       Subjective    Clinically improving. Feeling better. Stable overnight. No other overnight issues reported. No CP, SOB, palpitations, blurred vision, HA, lightheadedness, LOC or focal neurological deficits    Exam:  BP (!) 162/86   Pulse 88   Temp 98.1 °F (36.7 °C)   Resp 18   Wt 159 lb 6.3 oz (72.3 kg)   SpO2 93%   BMI 32.19 kg/m²   General appearance: No apparent distress, appears stated age and cooperative. HEENT: Pupils equal, round, and reactive to light. Conjunctivae/corneas clear. Neck: Supple. No jugular venous distention. Trachea midline. Respiratory:  Normal respiratory effort. Clear to auscultation, bilaterally without Rales/Wheezes/Rhonchi. Cardiovascular: Regular rate and rhythm with normal S1/S2 without murmurs, rubs or gallops. Abdomen: Soft, non-tender, non-distended with normal bowel sounds. Musculoskeletal: No clubbing, cyanosis or edema bilaterally. Brisk capillary refill. 2+ lower extremity pulses (dorsalis pedis). Skin:  No rashes    Neurologic: awake, alert and following commands     Medications:  Reviewed    Infusion Medications    sodium chloride      dextrose       Scheduled Medications    hydrALAZINE  25 mg Oral 3 times per day    ampicillin-sulbactam  3,000 mg IntraVENous Q24H    insulin lispro  6 Units SubCUTAneous TID WC    buPROPion  100 mg Oral Q48H    carvedilol  50 mg Oral QAM    sodium chloride flush  10 mL IntraVENous 2 times per day    insulin glargine  0.25 Units/kg SubCUTAneous Nightly    insulin lispro  0-12 Units SubCUTAneous TID WC    insulin lispro  0-6 Units SubCUTAneous Nightly    famotidine  10 mg Oral QAM    heparin (porcine)  5,000 Units SubCUTAneous Q8H     PRN Meds: albuterol, sodium chloride flush, sodium chloride, promethazine **OR** ondansetron, polyethylene glycol, acetaminophen **OR** acetaminophen, glucose, dextrose, glucagon (rDNA), dextrose    I/O    Intake/Output Summary (Last 24 hours) at 3/19/2022 0857  Last data filed at 3/18/2022 1658  Gross per 24 hour   Intake 540 ml   Output 2500 ml   Net -1960 ml       Labs:   Recent Labs     03/17/22  1200 03/18/22  0445   WBC 6.4 6.3   HGB 7.8* 8.1*   HCT 22.6* 24.0*    184       Recent Labs     03/17/22  1200 03/18/22  0445    131*   K 3.7 4.8   CL 94* 91*   CO2 24 23   BUN 39* 54*   CREATININE 7.7* 8.8*   CALCIUM 8.4* 8.5*       Recent Labs     03/17/22  1200   PROT 6.5   ALKPHOS 127*   ALT 22   AST 38*   BILITOT 0.5       No results for input(s): INR in the last 72 hours.     No results for input(s): CKTOTAL, TROPONINI in the last 72 hours. Chronic labs:  Lab Results   Component Value Date    CHOL 155 10/08/2013    TRIG 68 10/08/2013    HDL 40 01/29/2021    LDLCALC 41 01/29/2021    TSH 3.850 01/27/2021    INR 1.1 11/26/2018    LABA1C 9.0 (H) 03/18/2022       Radiology:  Imaging studies reviewed today. ASSESSMENT:    Principal Problem:    Metabolic encephalopathy  Active Problems:    ESRD (end stage renal disease) on dialysis ([facility])    HTN (hypertension), benign    Uncontrolled type 1 diabetes mellitus with hyperglycemia (HCC)    Hypotension    History of venous thromboembolism    Dental abscess    Anemia of chronic renal failure, stage 5 ([facility])  Resolved Problems:    * No resolved hospital problems. *     -PLAN:    Dental abscess   evident on CT face  IV Unasyn renally dosed--> Augmentin  Blood cultures  Check Covid swab  Procalcitonin, CRP, ESR  Lactic acid normal at 1.9  WBC normal at 6.3  Afebrile  Dental consult appreciated-status post I&D of abscess with plan for retained root extraction Tuesday 3/22    Hypotension transient  Resolved after IVF  Monitor  Lactate 1.9    Hypertension of CKD  Continue antihypertensives  Monitor    DM 1-uncontrolled  Lantus, prandial insulin, sliding scale  MBS  A1c 9.8    ESRD  IHD  Nephro consult    Anemia of CKD  Check iron studies  Retacrit  Monitor  Transfuse for hemoglobin less than 7    Metabolic encephalopathy  Multifactorial-infection, uremia  Treat underlying conditions  Avoid circadian disruption  Avoid anticholinergics and sedating medications  Reorient to time place and situation frequently  Monitor neurological status closely  Evaluate for fecal or urinary retention  Attempt to re-orient to normal sleep-wake cycle ie up in chair, lights on, shades up during day. Quiet and dark at night       Diet: ADULT DIET;  Regular; 3 carb choices (45 gm/meal)  Code Status: Full Code  PT/OT Eval Status:   As needed  DVT Prophylaxis:   Heparin  Recommended disposition at discharge: Discharge home today    +++++++++++++++++++++++++++++++++++++++++++++++++  Joey Henriquez MD   [facility].  +++++++++++++++++++++++++++++++++++++++++++++++++  NOTE: This report was transcribed using voice recognition software. Every effort was made to ensure accuracy; however, inadvertent computerized transcription errors may be present.

## 2022-03-19 NOTE — DISCHARGE SUMMARY
Physician Discharge Summary     Patient ID:  Ghanshyam Arias  [id number]  89 y.o.  1985    Admit date: 3/17/2022    Discharge date and time:  3/19/2022    Discharge Diagnoses: Principal Problem:    Metabolic encephalopathy  Active Problems:    ESRD (end stage renal disease) on dialysis ([facility]    HTN (hypertension), benign    Uncontrolled type 1 diabetes mellitus with hyperglycemia (HCC)    Hypotension    History of venous thromboembolism    Dental abscess    Anemia of chronic renal failure, stage 5 (HCC)  Resolved Problems:    * No resolved hospital problems.  *      Consults: IP CONSULT TO INTERNAL MEDICINE  IP CONSULT TO NEPHROLOGY  IP CONSULT TO ORAL SURGERY    Procedures: See below    Hospital Course: Patient admitted on 3/17/2022 39y.o. year old female  who  has a past medical history of JOLLY (acute kidney injury) ([facility] AVF (arteriovenous fistula) ([facility] Chronic kidney disease, Dialysis AV fistula malfunction, initial encounter ([facility] DM type 1 (diabetes mellitus, type 1) ([facility] Encounter regarding vascular access for dialysis for ESRD ([facility] ESRD (end stage renal disease) ([facility] Hemodialysis patient ([facility] Hypertension, and Tobacco abuse.      Patient presented to the emergency department from dialysis with low blood pressure.  She is also reporting some abdominal and chest pain.  Noted to have facial swelling.  Initial vital signs show the patient with a blood pressure of 86/46.  Currently she is 142/76 after IV fluids.  Laboratory studies demonstrate creatinine 7.7, BUN 39, glucose 337, troponin of 122, proBNP 34,981, hemoglobin 7.8.  CT head unremarkable.  CT abdomen pelvis shows groundglass opacities and septal thickening in the bilateral lung bases which could represent interstitial and alveolar edema as well as atelectasis.  Bilateral pleural effusions.  Diffuse subcutaneous edema and small amount of ascites.  Patient also reporting left facial swelling that she says secondary to a dental abscess that happened after she broke her tooth.  This has not been treated at this point.  Medicine consulted for admission. Dental abscess   evident on CT face  IV Unasyn renally dosed--> Augmentin  Blood cultures  Check Covid swab  Procalcitonin, CRP, ESR  Lactic acid normal at 1.9  WBC normal at 6.3  Afebrile  Dental consult appreciated-status post I&D of abscess with plan for retained root extraction Tuesday 3/22     Hypotension transient  Resolved after IVF  Monitor  Lactate 1.9     Hypertension of CKD  Continue antihypertensives  Monitor     DM 1-uncontrolled  Lantus, prandial insulin, sliding scale  MBS  A1c 9.8     ESRD  IHD  Nephro consult     Anemia of CKD  Check iron studies  Retacrit  Monitor  Transfuse for hemoglobin less than 7     Metabolic encephalopathy  Multifactorial-infection, uremia    Discharge Exam:  See progress note from today    Condition:  Stable    Disposition: home    Patient Instructions:   Current Discharge Medication List      START taking these medications    Details   amoxicillin-clavulanate (AUGMENTIN) 500-125 MG per tablet Take 1 tablet by mouth every 24 hours for 10 days On dialysis days take after dialysis  Qty: 10 tablet, Refills: 0      chlorhexidine (PERIDEX) 0.12 % solution Take 15 mLs by mouth 2 times daily for 14 days  Qty: 420 mL, Refills: 0         CONTINUE these medications which have NOT CHANGED    Details   albuterol (PROVENTIL) (2.5 MG/3ML) 0.083% nebulizer solution Take 3 mLs by nebulization every 6 hours as needed for Wheezing or Shortness of Breath  Qty: 90 each, Refills: 3    Associated Diagnoses: SOB (shortness of breath)      clindamycin (CLEOCIN T) 1 % lotion Apply topically 2 times daily. Qty: 60 g, Refills: 5    Associated Diagnoses: Eczema, unspecified type      clotrimazole-betamethasone (LOTRISONE) 1-0.05 % cream Apply topically 2 times daily.   Qty: 45 g, Refills: 5    Associated Diagnoses: Hidradenitis suppurativa; Dermatitis      bacitracin zinc 500 UNIT/GM ointment Apply topically daily Apply topically 2 times daily.   Qty: 30 g, Refills: 2      Probiotic Product (PROBIOTIC ADVANCED PO) Take by mouth      Folic Acid 0.8 MG CAPS Take by mouth      buPROPion (WELLBUTRIN SR) 100 MG extended release tablet Take 1 tablet by mouth every 48 hours  Qty: 15 tablet, Refills: 5    Associated Diagnoses: Tobacco abuse      Cholecalciferol (VITAMIN D3) 50 MCG (2000 UT) CAPS Take 1 capsule by mouth daily  Qty: 30 capsule, Refills: 1      brompheniramine-pseudoephedrine-DM (BROMFED DM) 2-30-10 MG/5ML syrup Take 10 mLs by mouth 4 times daily as needed for Congestion or Cough  Qty: 473 mL, Refills: 2    Associated Diagnoses: Nasal congestion; Dry cough      albuterol sulfate  (90 Base) MCG/ACT inhaler Inhale 2 puffs into the lungs every 4-6 hours as needed for Wheezing or Shortness of Breath  Qty: 1 Inhaler, Refills: 2    Associated Diagnoses: SOB (shortness of breath)      B Complex-C-Folic Acid (YOLANDA-JACOB) TABS TAKE 1 TABLET BY MOUTH DAILY WITH SUPPER  Qty: 30 tablet, Refills: 5    Associated Diagnoses: Vitamin B deficiency, unspecified      hydrALAZINE (APRESOLINE) 50 MG tablet Take 1 tablet by mouth every 8 hours  Qty: 90 tablet, Refills: 3      Insulin Glargine, 2 Unit Dial, (TOUJEO MAX SOLOSTAR) 300 UNIT/ML SOPN Inject 16 Units into the skin nightly       insulin lispro, 1 Unit Dial, (HUMALOG KWIKPEN) 100 UNIT/ML SOPN Inject 10 Units into the skin 3 times daily (before meals) *Plus Sliding Scale*      famotidine (PEPCID) 20 MG tablet Take 1 tablet by mouth every morning  Qty: 30 tablet, Refills: 3    Associated Diagnoses: Gastroesophageal reflux disease without esophagitis      medroxyPROGESTERone (DEPO-PROVERA) 150 MG/ML injection Inject 150 mg into the muscle every 3 months   Refills: 3      carvedilol (COREG) 25 MG tablet Take 50 mg by mouth every morning 2 tabs am and one tab pm  Refills: 5           Activity: activity as tolerated  Diet: renal diet diabetic    Follow-up with [facility] as scheduled    Note that over 30 minutes was spent in preparing discharge papers, discussing discharge with patient, staff, consultants, medication review, arranging follow up, etc.    Signed:  Marti Holbrook MD  3/19/2022  10:00 AM

## 2022-03-19 NOTE — PROGRESS NOTES
Department of Internal Medicine  Nephrology Attending Progress Note    SUBJECTIVE:  We are following this patient for end-stage renal failure . History of Present Ilness:    Beltran Lama is a 39 y.o. female with with a history of ESRD HD dependent MWF hypertension, and type 1 diabetes mellitus. Steffanybrunilda Feng He presented to ED yesterday with complaints of altered mental status. . According to patient he was at her usual hemodialysis treatment yesterday and he wants to end up with treatment she experienced a sensation of feeling hot and does not recall what happened thereafter. Transfer records indicate that patient became hypotensive and was subsequently transferred to ED for evaluation. Patient also complains of increasing facial edema more pronounced on the left side. He has had some dental caries on that side. Initial blood pressure in the ED was 86/46 with approximately 3 and a temperature of 97. Patient was 91% on room air. . She denied chest pain or shortness of breath. Laboratory data was significant for high-sensitivity troponin of 120, WBC 6.4, hemoglobin 7.8, glucose 357, potassium 3.7, BUN 30 noted creatinine of 7.7. A noncontrast CT scan of the head demonstrated no acute intracranial abnormality. A noncontrast CT scan of the abdomen and pelvis revealed thickened urinary bladder wall otherwise no obstruction.   Patient was admitted to telemetry for further management.     3/19: sp hd today with 2.5 L off    PROBLEM LIST:    Patient Active Problem List   Diagnosis    ESRD (end stage renal disease) on dialysis ([address]    HTN (hypertension), benign    Uncontrolled type 1 diabetes mellitus with hyperglycemia ([address]    Chronic deep vein thrombosis (DVT) of right upper extremity (HCC)    Facial cellulitis    Acute hyperkalemia    Nasal polyp    Periorbital cellulitis of right eye    Periorbital cellulitis    Vitamin B deficiency, unspecified    Abscess    Nasal congestion    Dry cough    JONAS done and advised due for a pap on 121/12/2018.Tushar Shaffer CMA     Hidradenitis suppurativa    Symptomatic anemia    Anemia    Acute congestive heart failure (HCC)    Volume overload    Gall stone    Non-compliance    Metabolic encephalopathy    Groin abscess    Hypoxia    Hypotension    History of venous thromboembolism    Dental abscess    Anemia of chronic renal failure, stage 5 (HCC)        PAST MEDICAL HISTORY:    Past Medical History:   Diagnosis Date    JOLLY (acute kidney injury) ([facility] 4/5/2016    AVF (arteriovenous fistula) ([facility] 02/19/2018    let arm    Chronic kidney disease     Dialysis AV fistula malfunction, initial encounter ([facility] 11/7/2019    DM type 1 (diabetes mellitus, type 1) ([facility]     Encounter regarding vascular access for dialysis for ESRD ([facility] 07/12/2018    ESRD (end stage renal disease) ([facility]     Hemodialysis patient ([facility]     amrita plattt mon wed fri / graft in left arm    Hypertension     Tobacco abuse 4/5/2016       MEDS (scheduled):   hydrALAZINE  25 mg Oral 3 times per day    ampicillin-sulbactam  3,000 mg IntraVENous Q24H    insulin lispro  6 Units SubCUTAneous TID WC    buPROPion  100 mg Oral Q48H    carvedilol  50 mg Oral QAM    sodium chloride flush  10 mL IntraVENous 2 times per day    insulin glargine  0.25 Units/kg SubCUTAneous Nightly    insulin lispro  0-12 Units SubCUTAneous TID WC    insulin lispro  0-6 Units SubCUTAneous Nightly    famotidine  10 mg Oral QAM    heparin (porcine)  5,000 Units SubCUTAneous Q8H       MEDS (infusions):   sodium chloride      dextrose         MEDS (prn):  albuterol, sodium chloride flush, sodium chloride, promethazine **OR** ondansetron, polyethylene glycol, acetaminophen **OR** acetaminophen, glucose, dextrose, glucagon (rDNA), dextrose    DIET:    ADULT DIET;  Regular; 3 carb choices (45 gm/meal)      PHYSICAL EXAM:      Patient Vitals for the past 24 hrs:   BP Temp Pulse Resp SpO2 Weight   03/19/22 1032 (!) 145/63 98.3 °F (36.8 °C) 92 18 97 % --   03/19/22 0947 (!) 199/94 98.3 °F (36.8 °C) 92 18 -- 153 lb 14.1 oz (69.8 kg)   03/19/22 0930 (!) 198/97 -- 90 -- -- --   03/19/22 0900 (!) 187/87 -- 88 -- -- --   03/19/22 0830 (!) 162/86 -- 88 -- -- --   03/19/22 0800 (!) 161/76 -- 88 -- -- --   03/19/22 0730 (!) 184/84 -- 92 -- -- --   03/19/22 0700 121/75 -- 86 -- -- --   03/19/22 0630 (!) 174/74 -- 87 -- -- --   03/19/22 0609 (!) 152/70 -- 87 -- -- --   03/19/22 0604 (!) 152/77 98.1 °F (36.7 °C) 90 18 -- 159 lb 6.3 oz (72.3 kg)   03/18/22 1658 126/62 97.3 °F (36.3 °C) 87 20 -- 159 lb 13.3 oz (72.5 kg)   03/18/22 1630 138/62 -- 87 -- -- --   03/18/22 1600 138/71 -- 85 -- -- --   03/18/22 1530 (!) 151/76 -- 84 -- -- --   03/18/22 1500 135/67 -- 83 -- -- --   03/18/22 1430 (!) 118/57 -- 84 -- -- --   03/18/22 1400 (!) 114/53 -- 83 -- -- --   03/18/22 1330 121/61 -- 79 -- -- --   03/18/22 1328 (!) 112/57 -- 80 -- -- --   03/18/22 1323 (!) 111/57 97.7 °F (36.5 °C) 77 16 -- 164 lb 10.9 oz (74.7 kg)          Intake/Output Summary (Last 24 hours) at 3/19/2022 1132  Last data filed at 3/19/2022 1045  Gross per 24 hour   Intake 1000 ml   Output 5300 ml   Net -4300 ml       Wt Readings from Last 3 Encounters:   03/19/22 153 lb 14.1 oz (69.8 kg)   03/15/22 153 lb (69.4 kg)   02/22/22 135 lb (61.2 kg)       Constitutional:  Patient in no acute distress  Head: normocephalic, atraumatic  Cardiovascular: regular rate and rhythm, no murmurs, gallops, or rubs  Respiratory:  Clear, no rales, rhochi, or wheezes  Gastrointestinal: soft, nontender, nondistended  Ext:   Neuro:   Skin: dry, no rash      DATA:      Recent Labs     03/17/22  1200 03/18/22  0445   WBC 6.4 6.3   HGB 7.8* 8.1*   HCT 22.6* 24.0*   MCV 88.6 90.6    184     Recent Labs     03/17/22  1200 03/18/22  0445    131*   K 3.7 4.8   CL 94* 91*   CO2 24 23   BUN 39* 54*   CREATININE 7.7* 8.8*   LABGLOM 7 6   GLUCOSE 337* 637*   CALCIUM 8.4* 8.5*     Recent Labs     03/17/22  1200   ALT 22     Lab Results   Component Value Date LABALBU 3.1 (L) 03/17/2022    LABALBU 3.5 05/19/2021    LABALBU 3.5 01/27/2021       Iron studies:  Lab Results   Component Value Date    FERRITIN 336 03/18/2022    IRON 43 03/18/2022    TIBC 187 (L) 03/18/2022     Vitamin B-12   Date Value Ref Range Status   01/27/2021 784 211 - 946 pg/mL Final     Folate   Date Value Ref Range Status   01/27/2021 19.4 4.8 - 24.2 ng/mL Final       Bone disease:  Lab Results   Component Value Date    MG 2.2 02/01/2021    PHOS 6.6 (H) 02/01/2021     Vit D, 25-Hydroxy   Date Value Ref Range Status   11/06/2019 57 30 - 100 ng/mL Final     Comment:     <20 ng/mL. ........... Elmer Brittle Deficient  20-30 ng/mL. ......... Elmer Brittle Insufficient   ng/mL. ........ Elmer Brittle Sufficient  >100 ng/mL. .......... Elmer Brittle Toxic       PTH   Date Value Ref Range Status   01/29/2021 191 (H) 15 - 65 pg/mL Final       No components found for: URIC    Lab Results   Component Value Date    COLORU Yellow 11/23/2017    NITRU Negative 11/23/2017    GLUCOSEU 500 11/23/2017    GLUCOSEU >=1000 11/21/2010    KETUA Negative 11/23/2017    UROBILINOGEN 0.2 11/23/2017    BILIRUBINUR Negative 11/23/2017    BILIRUBINUR NEGATIVE 11/21/2010       No results found for: Jerry Eisenmenger        IMPRESSION/RECOMMENDATIONS:      1. Altered mental status  most likely due to transient cerebral hypoperfusion from intradialytic hypotension  appears to be back to baseline with no focal deficits.     2. Hypotensive episode during dialysis  most likely due to fluid shifts     3. left-sided dental abscess  Dentistry seeing pt     4. volume overload  Correct with hemodialysis  Fluid and sodium restriction  2.5 L off today, 2.2 L on fri  Extra tx today for volume     5. Type I DM with CKD  Continue basal/bolus insulin      6 ESRD  HD MWF      Pérez Pickering.  Mayra Do MD

## 2022-03-19 NOTE — DISCHARGE INSTR - DIET

## 2022-03-19 NOTE — FLOWSHEET NOTE
03/19/22 0947   Vital Signs   BP (!) 199/94   Temp 98.3 °F (36.8 °C)   Pulse 92   Resp 18   Weight 153 lb 14.1 oz (69.8 kg)   Weight Method Bed scale   Percent Weight Change -3.46   Pain Assessment   Pain Assessment 0-10   Pain Level 0   Post-Hemodialysis Assessment   Post-Treatment Procedures Blood returned; Access bleeding time < 10 minutes   Machine Disinfection Process Exterior Machine Disinfection   Rinseback Volume (ml) 300 ml   Total Liters Processed (l/min) 77 l/min   Dialyzer Clearance Lightly streaked   Duration of Treatment (minutes) 210 minutes   Heparin amount administered during treatment (units) 0 units   Hemodialysis Intake (ml) 300 ml   Hemodialysis Output (ml) 2800 ml   NET Removed (ml) 2500 ml   Tolerated Treatment Good   Patient Response to Treatment tolerated well, profile B, refill noted, 2500cc fluid removal   Bilateral Breath Sounds Diminished   Edema Facial

## 2022-03-21 ENCOUNTER — CARE COORDINATION (OUTPATIENT)
Dept: CASE MANAGEMENT | Age: 37
End: 2022-03-21

## 2022-03-21 NOTE — CARE COORDINATION
[facility] Transitions Initial Follow Up Call    Call within 2 business days of discharge: Yes    Patient: Yusuf Reagan Patient : 1985   MRN: [id number]  Reason for Admission: AMS   Discharge Date: 3/19/22 RARS: Readmission Risk Score: 21.4 ( )      Last Discharge [facility]       Complaint Diagnosis Description Type Department Provider    3/17/22 Hypotension Altered mental status, unspecified altered mental status type ED to Hosp-Admission (Discharged) (ADMITTED) [facility] Trey Almanzar MD; Chivo Alicia... First attempt to reach the patient for initial Care Transition call post hospital discharge. Message left with CTN's contact information requesting return phone call. Follow Up  No future appointments.     Esther Cornejo RN

## 2022-03-22 ENCOUNTER — CARE COORDINATION (OUTPATIENT)
Dept: CASE MANAGEMENT | Age: 37
End: 2022-03-22

## 2022-03-22 DIAGNOSIS — G93.41 METABOLIC ENCEPHALOPATHY: Primary | ICD-10-CM

## 2022-03-22 LAB
BLOOD CULTURE, ROUTINE: NORMAL
CULTURE, BLOOD 2: NORMAL

## 2022-03-22 PROCEDURE — 1111F DSCHRG MED/CURRENT MED MERGE: CPT | Performed by: INTERNAL MEDICINE

## 2022-03-22 NOTE — CARE COORDINATION
[facility] 45 Transitions Initial Follow Up Call    Call within 2 business days of discharge: Yes    Patient: Malena Navas Patient : 1985   MRN: [id number]  Reason for Admission: AMS   Discharge Date: 3/19/22 RARS: Readmission Risk Score: 21.4 ( )      Last Discharge [facility]       Complaint Diagnosis Description Type Department Provider    3/17/22 Hypotension Altered mental status, unspecified altered mental status type ED to Hosp-Admission (Discharged) (ADMITTED) [facility] 4S PICU Greg Vanessa MD; Janeth Woo... Spoke with: Corina: [facility]      Non-face-to-face services provided:  Scheduled appointment with PCP-Michelle requested a HFUnext week on Tuesday or Thursday due to her dialysis schedule and having an appointment at the vascular access center this Thursday. Was able to schedule for 3/31/22 at 1:45    Scheduled appointment with Estefanía Seaman just arrived at the [facility] for her tooth extraction   Obtained and reviewed discharge summary and/or continuity of care documents    Care Transitions 24 Hour Call    Schedule Follow Up Appointment with PCP: Completed  Do you have any ongoing symptoms?: No  Do you have a copy of your discharge instructions?: Yes  Do you have all of your prescriptions and are they filled?: Yes  Have you been contacted by a [facility] Pharmacist?: No  Have you scheduled your follow up appointment?: No  Were you discharged with any Home Care or Post Acute Services: No  Do you feel like you have everything you need to keep you well at home?: Yes  Care Transitions Interventions     Spoke with Justin Swift for initial care transition call post hospital discharge. She reports that she is feeling \"good\" today. She denies any concerns with her mentation. She just arrived at the [place] [id number] to have her tooth extracted. She went to HD yesterday, denies any issues. Med review completed, 1111F entered. Jennifer Bauman denies any needs, questions, or concerns at this time.      Follow Up  Future Appointments   Date Time Provider Titus Potter   3/31/2022  1:45 PM Kae Ren  [address]       Sunita White RN none

## 2022-03-29 ENCOUNTER — CARE COORDINATION (OUTPATIENT)
Dept: CASE MANAGEMENT | Age: 37
End: 2022-03-29

## 2022-03-29 NOTE — CARE COORDINATION
[facility] Transitions Follow Up Call    3/29/2022    Patient: Jhoan Robledo  Patient : 1985   MRN: [id number]  Reason for Admission: AMS  Discharge Date: 3/19/22 RARS: Readmission Risk Score: 21.4 ( )    Spoke with: [facility] Transitions Subsequent and Final Call    Subsequent and Final Calls  Do you have any ongoing symptoms?: No  Have your medications changed?: No  Do you have any questions related to your medications?: No  Do you currently have any active services?: No  Do you have any needs or concerns that I can assist you with?: No  Identified Barriers: None  Care Transitions Interventions  No Identified Needs  Other Interventions:       Spoke with Radames Saldivar for follow up care transition call. She reports that she is feeling \"good\" today. She did not end up having her tooth pulled last week and is scheduled for next week. She denies any periods of confusion and denies any concerns with HD. Radames Sladivar denies any needs, questions, or concerns at this time.      Follow Up  Future Appointments   Date Time Provider Titus Potter   3/31/2022  1:45 PM Matt Alegria  [address]       Jabier Gosselin, RN

## 2022-04-05 ENCOUNTER — CARE COORDINATION (OUTPATIENT)
Dept: CASE MANAGEMENT | Age: 37
End: 2022-04-05

## 2022-04-07 ENCOUNTER — CARE COORDINATION (OUTPATIENT)
Dept: CASE MANAGEMENT | Age: 37
End: 2022-04-07

## 2022-04-07 NOTE — CARE COORDINATION
[facility] Transitions Follow Up Call    2022    Patient: Erna Coffman  Patient : 1985   MRN: [id number]  Reason for Admission: AMS   Discharge Date: 3/19/22 RARS: Readmission Risk Score: 21.4 ( )    Spoke with: [facility] Transitions Subsequent and Final Call    Subsequent and Final Calls  Do you have any ongoing symptoms?: No  Have your medications changed?: No  Do you have any questions related to your medications?: No  Do you currently have any active services?: No  Do you have any needs or concerns that I can assist you with?: No  Identified Barriers: None  Care Transitions Interventions  No Identified Needs  Other Interventions:       Spoke with Yamilettod Berrios for follow up care transition call. She reports that she is feeling \"much better\" today. She stated she saw her kidney doctor yesterday with dialysis and explained how she was feeling. She stated he \"changed some things\" and she is no longer fatigued. Yamilet Berrios denies any needs, questions, or concerns at this time and kindly declines the need for further follow up calls at this time. Follow Up  No future appointments.     Radha Sandoval RN

## 2022-05-17 NOTE — ED PROVIDER NOTES
Maria Esther Amezquita is a 38 y.o. female presenting to the ED for evaluation of right groin abscess. Her swelling started on right groin since Saturday and got really worse since Monday. The swelling has been growing and  becoming indurated on her. Yesterday, her abscess popped open and had drainage of copious amount of pus. She had relief of her symptoms with pus drainage. Gives history similar episode on the other leg which required debridement under anesthesia. This is associated with right lower extremity swelling. Denies fevers and chills. She is a hemodialysis patient. Due for dialysis today. Gradual onset, worsening, moderate severity, now with drainage with improved discomfort. The complaint has been constant, severe in severity, and worsened by nothing. Their complaint is made better by nothing.      Patient has a medical history as listed below:   Past Medical History:   Diagnosis Date    JOLLY (acute kidney injury) ([address] 4/5/2016    AVF (arteriovenous fistula) ([address] 02/19/2018    let arm    Chronic kidney disease     Dialysis AV fistula malfunction, initial encounter ([address] 11/7/2019    DM type 1 (diabetes mellitus, type 1) ([address]     Encounter regarding vascular access for dialysis for ESRD ([address] 07/12/2018    ESRD (end stage renal disease) ([address]     Hemodialysis patient ([address]     amrita dawson mon wed fri / graft in left arm    Hypertension     Tobacco abuse 4/5/2016     Patient Active Problem List    Diagnosis Date Noted    ESRD needing dialysis ([address] 01/27/2021    Volume overload 01/27/2021    Gall stone 01/27/2021    Non-compliance 01/27/2021    AMS (altered mental status) 01/27/2021    Acute congestive heart failure ([address] 07/21/2020    Anemia 07/14/2020    Symptomatic anemia 07/13/2020    Abscess 03/17/2020    Nasal congestion 03/17/2020    Dry cough 03/17/2020    Hidradenitis suppurativa 03/17/2020    Vitamin B deficiency, unspecified 02/25/2020    Periorbital cellulitis     Periorbital cellulitis of right eye 11/04/2019    Nasal polyp 09/19/2019    Acute hyperkalemia     Facial cellulitis 09/28/2018    Uncontrolled type 1 diabetes mellitus with hyperglycemia ([facility] 05/07/2018    Chronic deep vein thrombosis (DVT) of right upper extremity ([facility] 05/07/2018    HTN (hypertension), benign 10/17/2017    ESRD (end stage renal disease) on dialysis ([facility] 09/26/2016             Review of Systems   Constitutional: Positive for fatigue. Negative for fever. Respiratory: Negative for chest tightness and shortness of breath. Cardiovascular: Positive for leg swelling. Negative for chest pain and palpitations. Gastrointestinal: Negative for nausea and vomiting. Genitourinary:        Barely makes urine   Musculoskeletal:        Positive for right groin pain , bilateral leg swelling   Skin: Positive for wound (On the right groin). All other systems reviewed and are negative. Physical Exam  Constitutional:       Appearance: Normal appearance. HENT:      Head: Normocephalic and atraumatic. Nose: Nose normal.      Mouth/Throat:      Mouth: Mucous membranes are moist.      Pharynx: Oropharynx is clear. Eyes:      Extraocular Movements: Extraocular movements intact. Pupils: Pupils are equal, round, and reactive to light. Cardiovascular:      Rate and Rhythm: Normal rate and regular rhythm. Pulses: Normal pulses. Heart sounds: Normal heart sounds. Pulmonary:      Effort: Pulmonary effort is normal.      Breath sounds: Normal breath sounds. Abdominal:      General: Abdomen is flat. Palpations: Abdomen is soft. Musculoskeletal:         General: Swelling (Right groin swelling about 10 cm * 10 cm, hard, indurated, erythema) and tenderness present. Cervical back: Neck supple. Right lower leg: Edema present. Left lower leg: Edema present. Skin:     General: Skin is warm and dry.    Neurological:      General: No focal deficit present. Mental Status: She is alert and oriented to person, place, and time. Procedures     MDM     38year-old insulin dependent diabetic, ESRD patient coming in with right groin swelling since Saturday. Progressively increasing . Had a spontaneous drainage with drainage of copious amount of pus on Monday with relief of symptoms. Right groin appears swollen and indurated, about 10 into 10 cm in size. Patient had similar presentation in January 2021 the opposite leg which needed I&D. Denies fevers and chills, palpitations, chest pain. Is due for dialysis today. Lab work in the emergency room shows WBC 8.3, hemoglobin 8.5 g/DL, creatinine 8 mg/DL, blood glucose 216/DL, CO2 30 minimal/liter, lactic acid 1.8 mmol/liter. Blood cultures sent. Patient was started on clindamycin, Vancomycin and Zosyn. General surgery on board. Nephrology consulted as well. 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 Patient presents to the ED for evaluation. This patient was seen and evaluated with the attending. Work-up was performed with concerns for but not limited to       EKG: normal sinus rhythm.          --------------------------------------------- PAST HISTORY ---------------------------------------------  Past Medical History:  has a past medical history of JOLLY (acute kidney injury) ([facility] AVF (arteriovenous fistula) ([facility] [address] [address] Chronic kidney disease, Dialysis AV fistula malfunction, [facility] encounter [facility] DM type 1 (diabetes mellitus, type 1) ([facility] Encounter regarding vascular access for dialysis for ESRD ([facility] ESRD (end stage renal disease) ([facility] Hemodialysis patient ([facility] Hypertension, and Tobacco abuse.     Past Surgical History:  has a past surgical history that includes Dilation and curettage of uterus (2009); cyst incision and drainage (8/4/2011); other surgical history (1017/12); fracture surgery (October 11,2012); other surgical history (2016); other surgical history (2016); other surgical history (Left, 2017);  section (); pr insert non-tunnel cv cath (N/A, 5/10/2018); pr anastomosis,av,any site (Left, 2018); pr vitrectomy pars plana remove preretinal membrane (Left, 2018); Dialysis fistula creation (Left, 2019); and Rectal surgery (N/A, 2021). Social History:  reports that she has been smoking cigarettes. She has smoked for the past 15.00 years. She has never used smokeless tobacco. She reports previous alcohol use. She reports that she does not use drugs. Family History: family history includes Cancer in her father; Diabetes in her paternal aunt; Stroke in her mother. The patients home medications have been reviewed. Allergies: Patient has no known allergies.     -------------------------------------------------- RESULTS -------------------------------------------------    LABS:  Results for orders placed or performed during the hospital encounter of 21   CBC WITH AUTO DIFFERENTIAL   Result Value Ref Range    WBC 8.3 4.5 - 11.5 E9/L    RBC 2.71 (L) 3.50 - 5.50 E12/L    Hemoglobin 8.5 (L) 11.5 - 15.5 g/dL    Hematocrit 24.7 (L) 34.0 - 48.0 %    MCV 91.1 80.0 - 99.9 fL    MCH 31.4 26.0 - 35.0 pg    MCHC 34.4 32.0 - 34.5 %    RDW 16.4 (H) 11.5 - 15.0 fL    Platelets 152 370 - 984 E9/L    MPV 10.4 7.0 - 12.0 fL    Neutrophils % 78.4 43.0 - 80.0 %    Immature Granulocytes % 0.7 0.0 - 5.0 %    Lymphocytes % 12.9 (L) 20.0 - 42.0 %    Monocytes % 5.8 2.0 - 12.0 %    Eosinophils % 2.1 0.0 - 6.0 %    Basophils % 0.1 0.0 - 2.0 %    Neutrophils Absolute 6.50 1.80 - 7.30 E9/L    Immature Granulocytes # 0.06 E9/L    Lymphocytes Absolute 1.07 (L) 1.50 - 4.00 E9/L    Monocytes Absolute 0.48 0.10 - 0.95 E9/L    Eosinophils Absolute 0.17 0.05 - 0.50 E9/L    Basophils Absolute 0.01 0.00 - 0.20 E9/L   Comprehensive metabolic panel   Result Value Ref Range    Sodium 137 132 - 146 mmol/L Potassium 3.9 3.5 - 5.0 mmol/L    Chloride 93 (L) 98 - 107 mmol/L    CO2 30 (H) 22 - 29 mmol/L    Anion Gap 14 7 - 16 mmol/L    Glucose 216 (H) 74 - 99 mg/dL    BUN 29 (H) 6 - 20 mg/dL    CREATININE 8.0 (H) 0.5 - 1.0 mg/dL    GFR Non-African American 7 >=60 mL/min/1.73    GFR African American 7     Calcium 9.1 8.6 - 10.2 mg/dL    Total Protein 7.2 6.4 - 8.3 g/dL    Albumin 3.5 3.5 - 5.2 g/dL    Total Bilirubin 0.4 0.0 - 1.2 mg/dL    Alkaline Phosphatase 122 (H) 35 - 104 U/L    ALT 32 0 - 32 U/L    AST 34 (H) 0 - 31 U/L   Lactic acid, plasma   Result Value Ref Range    Lactic Acid 1.8 0.5 - 2.2 mmol/L   POCT Glucose   Result Value Ref Range    Glucose 289 mg/dL    QC OK? ok    POCT Glucose   Result Value Ref Range    Meter Glucose 289 (H) 74 - 99 mg/dL       RADIOLOGY:  CT ABDOMEN PELVIS W IV CONTRAST Additional Contrast? None    (Results Pending)             ------------------------- NURSING NOTES AND VITALS REVIEWED ---------------------------  Date / Time Roomed:  5/19/2021 12:47 PM  ED Bed Assignment:  32/32    The nursing notes within the ED encounter and vital signs as below have been reviewed. Patient Vitals for the past 24 hrs:   BP Temp Temp src Pulse Resp SpO2 Height Weight   05/19/21 1222 (!) 117/58 97.1 °F (36.2 °C) Infrared 98 18 99 % 4' 11\" (1.499 m) 137 lb (62.1 kg)   05/19/21 1218 -- 97.1 °F (36.2 °C) -- 98 -- 94 % -- --           Counseling:  I have spoken with the patient/family members and discussed todays results, in addition to providing specific details for the plan of care and counseling regarding the diagnosis and prognosis. Their questions are answered at this time and they are agreeable with the plan of admission. This patient has remained hemodynamically stable during their ED course. Diagnosis:  1. Abscess of right groin    2. ESRD (end stage renal disease) ([facility]        Disposition:  Patient's disposition: Admit  Patient's condition is stable.     NOTE:  This report was transcribed using voice recognition software. Efforts were made to ensure accuracy; however, inadvertent computerized transcription errors may be present. Micki Stevenson MD  Resident  05/19/21 1531    ATTENDING PROVIDER ATTESTATION:     Timmy Dimas presented to the emergency department for evaluation of Abscess (Right groin, pain 10/10. States \"its seeping\")   and was initially evaluated by the Medical Resident. See Original ED Note for H&P and ED course above. I have reviewed and discussed the case, including pertinent history (medical, surgical, family and social) and exam findings with the Medical Resident assigned to Timmy Dimas. I have personally performed and/or participated in the history, exam, medical decision making, and procedures and agree with all pertinent clinical information. I, Dr. Tika Saldana, am the primary provider of record       I have reviewed my findings and recommendations with the assigned Medical Resident, Timmy Dimas and members of family present at the time of disposition. My findings/plan: The primary encounter diagnosis was Abscess of right groin. A diagnosis of ESRD (end stage renal disease) ([facility] was also pertinent to this visit.   Discharge Medication List as of 5/22/2021  1:45 PM      START taking these medications    Details   vancomycin (VANCOCIN) infusion Infuse 1,000 mg intravenously three times a week for 10 days Vancomycin 1 gram IV q HD for  10 days, Disp-5 g, R-0Print      ceFEPIme (MAXIPIME) 2 g injection Infuse 2,000 mg intravenously three times a week for 10 days Cefepime 2 grams IV q HD for 10 days, Disp-75088 mg, R-0Print           DO Tayla Huber DO  05/27/21 0120 Medical Necessity Clause: This procedure was medically necessary because the lesions that were treated were:

## 2022-05-31 DIAGNOSIS — K21.9 GASTROESOPHAGEAL REFLUX DISEASE WITHOUT ESOPHAGITIS: Primary | ICD-10-CM

## 2022-05-31 DIAGNOSIS — R19.7 DIARRHEA, UNSPECIFIED TYPE: ICD-10-CM

## 2022-06-20 DIAGNOSIS — K21.9 GASTROESOPHAGEAL REFLUX DISEASE WITHOUT ESOPHAGITIS: ICD-10-CM

## 2022-06-20 RX ORDER — FAMOTIDINE 20 MG/1
20 TABLET, FILM COATED ORAL EVERY MORNING
Qty: 30 TABLET | Refills: 3 | Status: CANCELLED | OUTPATIENT
Start: 2022-06-20

## 2022-06-21 DIAGNOSIS — K21.9 GASTROESOPHAGEAL REFLUX DISEASE WITHOUT ESOPHAGITIS: ICD-10-CM

## 2022-06-21 RX ORDER — FAMOTIDINE 20 MG/1
20 TABLET, FILM COATED ORAL EVERY MORNING
Qty: 30 TABLET | Refills: 3 | Status: SHIPPED
Start: 2022-06-21 | End: 2022-10-26

## 2022-07-07 LAB
AVERAGE GLUCOSE: NORMAL
HBA1C MFR BLD: 9.3 %

## 2022-07-28 ENCOUNTER — HOSPITAL ENCOUNTER (EMERGENCY)
Age: 37
Discharge: HOME | End: 2022-07-29
Attending: STUDENT IN AN ORGANIZED HEALTH CARE EDUCATION/TRAINING PROGRAM
Payer: MEDICARE

## 2022-07-28 ENCOUNTER — APPOINTMENT (OUTPATIENT)
Dept: GENERAL RADIOLOGY | Age: 37
End: 2022-07-28
Payer: MEDICARE

## 2022-07-28 DIAGNOSIS — Z99.2 ESRD NEEDING DIALYSIS (HCC): Primary | ICD-10-CM

## 2022-07-28 DIAGNOSIS — E87.5 HYPERKALEMIA: ICD-10-CM

## 2022-07-28 DIAGNOSIS — N18.6 ESRD NEEDING DIALYSIS (HCC): Primary | ICD-10-CM

## 2022-07-28 LAB
ALBUMIN SERPL-MCNC: 3.9 G/DL (ref 3.5–5.2)
ALP BLD-CCNC: 116 U/L (ref 35–104)
ALT SERPL-CCNC: 15 U/L (ref 0–32)
ANION GAP SERPL CALCULATED.3IONS-SCNC: 23 MMOL/L (ref 7–16)
ANION GAP SERPL CALCULATED.3IONS-SCNC: 25 MMOL/L (ref 7–16)
AST SERPL-CCNC: 19 U/L (ref 0–31)
BASOPHILS ABSOLUTE: 0.02 E9/L (ref 0–0.2)
BASOPHILS RELATIVE PERCENT: 0.4 % (ref 0–2)
BILIRUB SERPL-MCNC: 0.6 MG/DL (ref 0–1.2)
BUN BLDV-MCNC: 100 MG/DL (ref 6–20)
BUN BLDV-MCNC: 44 MG/DL (ref 6–20)
CALCIUM SERPL-MCNC: 8.9 MG/DL (ref 8.6–10.2)
CALCIUM SERPL-MCNC: 9.5 MG/DL (ref 8.6–10.2)
CHLORIDE BLD-SCNC: 94 MMOL/L (ref 98–107)
CHLORIDE BLD-SCNC: 97 MMOL/L (ref 98–107)
CO2: 19 MMOL/L (ref 22–29)
CO2: 19 MMOL/L (ref 22–29)
CREAT SERPL-MCNC: 17.2 MG/DL (ref 0.5–1)
CREAT SERPL-MCNC: 9.8 MG/DL (ref 0.5–1)
EOSINOPHILS ABSOLUTE: 0.15 E9/L (ref 0.05–0.5)
EOSINOPHILS RELATIVE PERCENT: 3 % (ref 0–6)
GFR AFRICAN AMERICAN: 3
GFR AFRICAN AMERICAN: 5
GFR NON-AFRICAN AMERICAN: 3 ML/MIN/1.73
GFR NON-AFRICAN AMERICAN: 5 ML/MIN/1.73
GLUCOSE BLD-MCNC: 299 MG/DL (ref 74–99)
GLUCOSE BLD-MCNC: 377 MG/DL (ref 74–99)
HCG QUALITATIVE: NEGATIVE
HCT VFR BLD CALC: 31.3 % (ref 34–48)
HEMOGLOBIN: 10.8 G/DL (ref 11.5–15.5)
IMMATURE GRANULOCYTES #: 0.02 E9/L
IMMATURE GRANULOCYTES %: 0.4 % (ref 0–5)
LACTIC ACID: 1.4 MMOL/L (ref 0.5–2.2)
LYMPHOCYTES ABSOLUTE: 1.03 E9/L (ref 1.5–4)
LYMPHOCYTES RELATIVE PERCENT: 20.3 % (ref 20–42)
MCH RBC QN AUTO: 30.4 PG (ref 26–35)
MCHC RBC AUTO-ENTMCNC: 34.5 % (ref 32–34.5)
MCV RBC AUTO: 88.2 FL (ref 80–99.9)
MONOCYTES ABSOLUTE: 0.34 E9/L (ref 0.1–0.95)
MONOCYTES RELATIVE PERCENT: 6.7 % (ref 2–12)
NEUTROPHILS ABSOLUTE: 3.51 E9/L (ref 1.8–7.3)
NEUTROPHILS RELATIVE PERCENT: 69.2 % (ref 43–80)
PDW BLD-RTO: 16.9 FL (ref 11.5–15)
PLATELET # BLD: 137 E9/L (ref 130–450)
PMV BLD AUTO: 10.4 FL (ref 7–12)
POTASSIUM REFLEX MAGNESIUM: 6.7 MMOL/L (ref 3.5–5)
POTASSIUM SERPL-SCNC: 4.7 MMOL/L (ref 3.5–5)
PRO-BNP: ABNORMAL PG/ML (ref 0–125)
RBC # BLD: 3.55 E12/L (ref 3.5–5.5)
SARS-COV-2, NAAT: NOT DETECTED
SODIUM BLD-SCNC: 138 MMOL/L (ref 132–146)
SODIUM BLD-SCNC: 139 MMOL/L (ref 132–146)
TOTAL PROTEIN: 7.9 G/DL (ref 6.4–8.3)
TROPONIN, HIGH SENSITIVITY: 248 NG/L (ref 0–9)
TROPONIN, HIGH SENSITIVITY: 295 NG/L (ref 0–9)
WBC # BLD: 5.1 E9/L (ref 4.5–11.5)

## 2022-07-28 PROCEDURE — 83880 ASSAY OF NATRIURETIC PEPTIDE: CPT

## 2022-07-28 PROCEDURE — 80048 BASIC METABOLIC PNL TOTAL CA: CPT

## 2022-07-28 PROCEDURE — 85025 COMPLETE CBC W/AUTO DIFF WBC: CPT

## 2022-07-28 PROCEDURE — 93005 ELECTROCARDIOGRAM TRACING: CPT | Performed by: STUDENT IN AN ORGANIZED HEALTH CARE EDUCATION/TRAINING PROGRAM

## 2022-07-28 PROCEDURE — 84703 CHORIONIC GONADOTROPIN ASSAY: CPT

## 2022-07-28 PROCEDURE — 71045 X-RAY EXAM CHEST 1 VIEW: CPT

## 2022-07-28 PROCEDURE — G0257 UNSCHED DIALYSIS ESRD PT HOS: HCPCS

## 2022-07-28 PROCEDURE — 96376 TX/PRO/DX INJ SAME DRUG ADON: CPT

## 2022-07-28 PROCEDURE — 80053 COMPREHEN METABOLIC PANEL: CPT

## 2022-07-28 PROCEDURE — 6370000000 HC RX 637 (ALT 250 FOR IP): Performed by: EMERGENCY MEDICINE

## 2022-07-28 PROCEDURE — 87635 SARS-COV-2 COVID-19 AMP PRB: CPT

## 2022-07-28 PROCEDURE — 84484 ASSAY OF TROPONIN QUANT: CPT

## 2022-07-28 PROCEDURE — 96374 THER/PROPH/DIAG INJ IV PUSH: CPT

## 2022-07-28 PROCEDURE — 83605 ASSAY OF LACTIC ACID: CPT

## 2022-07-28 PROCEDURE — 99285 EMERGENCY DEPT VISIT HI MDM: CPT

## 2022-07-28 PROCEDURE — 90935 HEMODIALYSIS ONE EVALUATION: CPT

## 2022-07-28 RX ADMIN — INSULIN HUMAN 6 UNITS: 100 INJECTION, SOLUTION PARENTERAL at 22:53

## 2022-07-28 ASSESSMENT — ENCOUNTER SYMPTOMS
COUGH: 0
DIARRHEA: 1
SHORTNESS OF BREATH: 1
CONSTIPATION: 0
BLOOD IN STOOL: 0
ABDOMINAL DISTENTION: 1
VOMITING: 0
ABDOMINAL PAIN: 1
NAUSEA: 1
RHINORRHEA: 0

## 2022-07-28 ASSESSMENT — PAIN - FUNCTIONAL ASSESSMENT: PAIN_FUNCTIONAL_ASSESSMENT: NONE - DENIES PAIN

## 2022-07-28 NOTE — CONSULTS
[facility]  Nephrology Consult Note    Patient's Name: Leidy Leon    Reason for Consult: ESRD on HD    Chief Complaint: Shortness of breath  History Obtained From:  patient, past medical records, and EMR    History of Present Illness:    Leidy Leon is a 44 y.o. female with a past medical history of hypertension, ESRD, and type 1 diabetes mellitus. She presented to the ED on 7/28 with complaints of shortness of breath. Vital signs at presentation to the ED include temperature 98, respirations 18, pulse 86, /53, and she was 97% on 3 L. Lab data at presentation to the ED include potassium 6.7, CO2 19, , creatinine 17.2, anion gap 25, and hemoglobin 10.8. We were consulted to see the patient for ESRD on HD. Patient is known to our service and dialyzes at [facility] second shift via a left arm AV fistula. At present, patient was seen and examined. She reports that she came in due to shortness of breath. She explained that her last HD was Thursday last week. She also explained that she was having abdominal pain, nausea, vomiting, and diarrhea. She denies any chest pain. She denies any headaches or dizziness. She does not wear O2 at home.     PMH:    Past Medical History:   Diagnosis Date    JOLLY (acute kidney injury) ([address] 4/5/2016    AVF (arteriovenous fistula) ([address] 02/19/2018    let arm    Chronic kidney disease     Dialysis AV fistula malfunction, initial encounter ([address] 11/7/2019    DM type 1 (diabetes mellitus, type 1) ([address]     Encounter regarding vascular access for dialysis for ESRD ([address] 07/12/2018    ESRD (end stage renal disease) ([address]     Hemodialysis patient ([address]     amrita dawson mon wed fri / graft in left arm    Hypertension     Tobacco abuse 4/5/2016       Patient Active Problem List   Diagnosis    ESRD (end stage renal disease) on dialysis ([address]    HTN (hypertension), benign    Uncontrolled type 1 diabetes mellitus with hyperglycemia ([facility]    Chronic deep vein thrombosis (DVT) of right upper extremity (HCC)    Facial cellulitis    Acute hyperkalemia    Nasal polyp    Periorbital cellulitis of right eye    Periorbital cellulitis    Vitamin B deficiency, unspecified    Abscess    Nasal congestion    Dry cough    Hidradenitis suppurativa    Symptomatic anemia    Anemia    Acute congestive heart failure (HCC)    Volume overload    Gall stone    Non-compliance    Metabolic encephalopathy    Groin abscess    Hypoxia    Hypotension    History of venous thromboembolism    Dental abscess    Anemia of chronic renal failure, stage 5 ([facility])       Meds:            Meds prn:         Meds prior to admission:     No current facility-administered medications on file prior to encounter. Current Outpatient Medications on File Prior to Encounter   Medication Sig Dispense Refill    famotidine (PEPCID) 20 MG tablet Take 1 tablet by mouth every morning 30 tablet 3    albuterol (PROVENTIL) (2.5 MG/3ML) 0.083% nebulizer solution Take 3 mLs by nebulization every 6 hours as needed for Wheezing or Shortness of Breath 90 each 3    clotrimazole-betamethasone (LOTRISONE) 1-0.05 % cream Apply topically 2 times daily. 45 g 5    bacitracin zinc 500 UNIT/GM ointment Apply topically daily Apply topically 2 times daily.  30 g 2    Probiotic Product (PROBIOTIC ADVANCED PO) Take by mouth      Folic Acid 0.8 MG CAPS Take by mouth      buPROPion (WELLBUTRIN SR) 100 MG extended release tablet Take 1 tablet by mouth every 48 hours 15 tablet 5    Cholecalciferol (VITAMIN D3) 50 MCG (2000 UT) CAPS Take 1 capsule by mouth daily 30 capsule 1    brompheniramine-pseudoephedrine-DM (BROMFED DM) 2-30-10 MG/5ML syrup Take 10 mLs by mouth 4 times daily as needed for Congestion or Cough 473 mL 2    albuterol sulfate  (90 Base) MCG/ACT inhaler Inhale 2 puffs into the lungs every 4-6 hours as needed for Wheezing or Shortness of Breath 1 Inhaler 2    B Complex-C-Folic Acid (YOLANDA-JACOB) TABS TAKE 1 TABLET BY MOUTH DAILY WITH SUPPER 30 tablet 5    hydrALAZINE (APRESOLINE) 50 MG tablet Take 1 tablet by mouth every 8 hours (Patient taking differently: Take 25 mg by mouth every 8 hours ) 90 tablet 3    Insulin Glargine, 2 Unit Dial, (TOUJEO MAX SOLOSTAR) 300 UNIT/ML SOPN Inject 16 Units into the skin nightly       insulin lispro, 1 Unit Dial, (HUMALOG KWIKPEN) 100 UNIT/ML SOPN Inject 10 Units into the skin 3 times daily (before meals) *Plus Sliding Scale*      medroxyPROGESTERone (DEPO-PROVERA) 150 MG/ML injection Inject 150 mg into the muscle every 3 months   3    carvedilol (COREG) 25 MG tablet Take 50 mg by mouth every morning 2 tabs am and one tab pm  5       Allergies:    Patient has no known allergies. Social History:     reports that she has been smoking cigarettes. She has never used smokeless tobacco. She reports that she does not currently use alcohol. She reports that she does not use drugs. Family History:         Problem Relation Age of Onset    Stroke Mother     Cancer Father     Diabetes Paternal Aunt        Review of Systems:   Pertinent items are noted in HPI. Physical Exam:      Patient Vitals for the past 24 hrs:   BP Temp Temp src Pulse Resp SpO2 Height Weight   07/28/22 1330 (!) 142/53 98 °F (36.7 °C) Oral 86 18 97 % 4' 11\" (1.499 m) 137 lb (62.1 kg)       No intake or output data in the 24 hours ending 07/28/22 1421    General: Awake, alert, no acute distress  Neck: No JVD noted  Lungs: Crackles bilaterally upper, diminished to the bases bilaterally. On oxygen  CV: Regular rate and rhythm. No rub  Abd: Rounded, soft, nontender, nondistended. Active bowel sounds  Skin: Warm and dry. No rash on exposed extremities  Ext: Trace BLE edema   Neuro: Awake, answers questions appropriately    Data:    No results for input(s): WBC, HGB, HCT, MCV, PLT in the last 72 hours.     No results for input(s): NA, K, CL, CO2, GLU, CREATININE, BUN, LABGLOM, GLUCOSE, CALCIUM, PHOS, MG in the last 72 hours. Vit D, 25-Hydroxy   Date Value Ref Range Status   11/06/2019 57 30 - 100 ng/mL Final     Comment:     <20 ng/mL. ........... Nishi Raddle Deficient  20-30 ng/mL. ......... Nishi Raddle Insufficient   ng/mL. ........ Nishi Raddle Sufficient  >100 ng/mL. .......... Nishi Raddle Toxic         PTH   Date Value Ref Range Status   01/29/2021 191 (H) 15 - 65 pg/mL Final       No results for input(s): ALT, AST, ALKPHOS, BILITOT, BILIDIR in the last 72 hours. No results for input(s): LABALBU in the last 72 hours. Ferritin   Date Value Ref Range Status   03/18/2022 336 ng/mL Final     Comment:     FERRITIN Reference Ranges:  Adult Males   20 - 60 years:    30 - 400 ng/mL  Adult females 16 - 61 years:    15 - 150 ng/mL  Adults greater than 60 years:   no established reference range  Pediatrics:                     no established reference range       Iron   Date Value Ref Range Status   03/18/2022 43 37 - 145 mcg/dL Final     TIBC   Date Value Ref Range Status   03/18/2022 187 (L) 250 - 450 mcg/dL Final       Vitamin B-12   Date Value Ref Range Status   01/27/2021 784 211 - 946 pg/mL Final       Folate   Date Value Ref Range Status   01/27/2021 19.4 4.8 - 24.2 ng/mL Final       Lab Results   Component Value Date/Time    COLORU Yellow 11/23/2017 10:05 PM    NITRU Negative 11/23/2017 10:05 PM    GLUCOSEU 500 11/23/2017 10:05 PM    GLUCOSEU >=1000 11/21/2010 10:30 PM    KETUA Negative 11/23/2017 10:05 PM    UROBILINOGEN 0.2 11/23/2017 10:05 PM    BILIRUBINUR Negative 11/23/2017 10:05 PM    BILIRUBINUR NEGATIVE 11/21/2010 10:30 PM       No results found for: NACHO, CREURRAN, MACREATRATIO, OSMOU    No components found for: URIC    No results found for: LIPIDPAN    Assessment and Plans:    ESRD on HD  OP [facility] TTS second shift  Via left arm AV fistula  For HD today  Continue HD while inpatient    2. Hypertension in CKD I-IV  BP goal<140/90  BP near goal  Monitor BP with HD    3.   Hyperkalemia  In the setting of ESRD/ missed HD treatments  K+ 6.7 today  For HD today  Low potassium diet when able eat  Will check BMP tonight  Monitor labs    4. Anemia  Hemoglobin target 10-12  Hemoglobin 10.8 today-at target  Transfuse for hemoglobin<7  No VALENTIN as hemoglobin at target  Monitor H&H    5. High anion gap metabolic acidosis  In setting of ESRD  CO2 19, anion gap 25 today  For HD today  Monitor labs    Thank you for allowing us to participate in the care of KEMI Harmon CNP    Patient seen and examined all key components of the physical performed independently , case discussed with NP, all pertinent labs and radiologic tests personally reviewed agree with above.       Arnaldo Ansari MD

## 2022-07-28 NOTE — ED PROVIDER NOTES
ATTENDING PROVIDER ATTESTATION:     Reanna Montemayor presented to the emergency department for evaluation of Shortness of Breath (Has not been able to go to dialysis because she has a child with CP and no one can watch her)    I have reviewed and discussed the case, including pertinent history (medical, surgical, family and social) and exam findings with the Resident and the Nurse assigned to Reanna Montemayor. I have personally performed and/or participated in the history, exam, medical decision making, and procedures and agree with all pertinent clinical information. I have reviewed my findings and recommendations with Reanna Montemayor and members of family present at the time of disposition. MDM:     I, Dr. Laraine Barthel am the primary provider of record    Patient presented to ED with fluid overload and hyperkalemia  Dialysis was arranged patient's symptoms resolved  Repeat potassium was WNL  Social work evaluated patient and arranged   Patient discharged home patient stable and would like ot be discharged home at this time    EKG: This EKG is signed and interpreted by me. Rate: normal  Rhythm: Sinus  Interpretation: non-specific EKG, no st elevation or depression  Comparison: no previous EKG      My findings/plan: The primary encounter diagnosis was ESRD needing dialysis ([facility] A diagnosis of Hyperkalemia was also pertinent to this visit. Discharge Medication List as of 7/29/2022  2:25 AM        Marshall Alvarez MD      The history is provided by the patient. Patient is a 44 y.o. female presents with a chief complaint of sob  This has been occurring for 6. Patient states that it gets better with nothing. Patient states that it gets worse with lying down flat. Patient states that it is moderate in severity. Patient states it was acute in onset.     Patient had acute onset of sob 6 days ago after missing HD last Saturday because patient did not have a . Patient has ESRD on HD Tu/Th/S; last HD 7/21/22. Patient also had nausea, decreased appetite, abdominal pain and fullness and diarrhea 3-4 loose BM/day, nonbloody nonmucoid. Patient developed ascites and edema in both of her cheeks. No pedal edema. No fever, cough, chest pain. Per EMS, patient was saturating in low 90s. SpO2 improved to 98% on 3 L O2 via NC. Review of Systems   Constitutional:  Positive for appetite change and fatigue. Negative for chills and fever. HENT:  Negative for congestion and rhinorrhea. Respiratory:  Positive for shortness of breath. Negative for cough. Cardiovascular:  Negative for chest pain, palpitations and leg swelling. Gastrointestinal:  Positive for abdominal distention, abdominal pain, diarrhea and nausea. Negative for blood in stool, constipation and vomiting. Genitourinary:  Negative for dysuria and hematuria. Patient able to make small amount of urine   Neurological:  Positive for light-headedness. Negative for dizziness and headaches. Physical Exam  Vitals reviewed. Exam conducted with a chaperone present (daughter present at bedside). Constitutional:       General: She is not in acute distress. Appearance: She is normal weight. She is not ill-appearing. HENT:      Head: Normocephalic and atraumatic. Eyes:      General: No scleral icterus. Extraocular Movements: Extraocular movements intact. Neck:      Vascular: No carotid bruit, hepatojugular reflux or JVD. Cardiovascular:      Rate and Rhythm: Normal rate and regular rhythm. Pulses: No decreased pulses. Heart sounds: No murmur heard. Pulmonary:      Effort: Pulmonary effort is normal. No tachypnea, accessory muscle usage or respiratory distress. Breath sounds: No stridor. Examination of the left-upper field reveals wheezing. Examination of the right-lower field reveals rales. Examination of the left-lower field reveals rales.  Wheezing and rales present. No decreased breath sounds or rhonchi. Abdominal:      General: Bowel sounds are normal. There is distension. Palpations: There is no hepatomegaly or mass. Tenderness: There is abdominal tenderness. There is right CVA tenderness and guarding. There is no left CVA tenderness or rebound. Musculoskeletal:      Right lower leg: No tenderness. No edema. Left lower leg: No tenderness. No edema. Lymphadenopathy:      Cervical: No cervical adenopathy. Skin:     General: Skin is warm and dry. Neurological:      General: No focal deficit present. Mental Status: She is alert and oriented to person, place, and time. Cranial Nerves: No cranial nerve deficit. Procedures     MDM  Number of Diagnoses or Management Options  ESRD needing dialysis ([facility]  Hyperkalemia  Diagnosis management comments: Ordered CMP for electrolytes, BUN, Cr for ESRD and LFTs for bilateral upper abdominal pain   Ordered CBC and lactic acid  Ordered troponin and proBNP  Ordered EKG   Ordered CXR for eval for fluid overload and acute infectious process causing sob  Ordered rapid COVID test  Ordered CT abd w/o for ascites and to rule out acute abd   Placed on 3 L O2 per NC, spO2 %  Consulted nephrology for HD managment  Not in respiratory distress upon initial evaluation  EKG no significant changes from previous EKG  WBC wnl, Hg shows anemia but higher than baseline  Pregnancy test negative  CXR shows bilateral hazy opacities and cephalization suspicious for pulmonary edema vs pneumonia  Lactic acid wnl  Rapid COVID test negative  Patient had HD while in ED total of 210 minutes, net removed 3.7 L fluid, tolerated HD well             Patient is a 44 y.o. female presenting with sob. Patient was given return precautions. Labs were interpreted by me. Patient will follow up with their primary care provider. Patient is agreeable to this plan. Patient has remained stable throughout their stay in the ED. Patient was seen and evaluated by myself and my attending Owen Saini MD. Assessment and Plan discussed with attending provider, please see attestation for final plan of care. This note was done using dictation software and there may be some grammatical errors associated with this. Nadeen Murphy MD       --------------------------------------------- PAST HISTORY ---------------------------------------------  Past Medical History:  has a past medical history of JOLLY (acute kidney injury) ([facility] AVF (arteriovenous fistula) ([facility] [address] Chronic kidney disease, Dialysis AV fistula malfunction, initial encounter ([facility] DM type 1 (diabetes mellitus, type 1) ([facility] Encounter regarding vascular access for dialysis for ESRD ([facility] [facility] ESRD (end stage renal disease) ([facility] [facility] Hemodialysis patient ([facility] Hypertension, and Tobacco abuse. Past Surgical History:  has a past surgical history that includes Dilation and curettage of uterus (); Breast cyst incision and drainage (2011); other surgical history (); fracture surgery (); other surgical history (2016); other surgical history (2016); other surgical history (Left, 2017);  section (); pr insert non-tunnel cv cath (N/A, 5/10/2018); pr anastomosis,av,any site (Left, 2018); pr vitrectomy pars plana remove preretinal membrane (Left, 2018); Dialysis fistula creation (Left, 2019); and Rectal surgery (N/A, 2021). Social History:  reports that she has been smoking cigarettes. She has never used smokeless tobacco. She reports that she does not currently use alcohol. She reports that she does not use drugs. Family History: family history includes Cancer in her father; Diabetes in her paternal aunt; Stroke in her mother. The patients home medications have been reviewed. Allergies: Patient has no known allergies.     -------------------------------------------------- RESULTS -------------------------------------------------  Labs:  Results for orders placed or performed during the hospital encounter of 07/28/22   COVID-19, Rapid    Specimen: Nasopharyngeal Swab   Result Value Ref Range    SARS-CoV-2, NAAT Not Detected Not Detected   CBC with Auto Differential   Result Value Ref Range    WBC 5.1 4.5 - 11.5 E9/L    RBC 3.55 3.50 - 5.50 E12/L    Hemoglobin 10.8 (L) 11.5 - 15.5 g/dL    Hematocrit 31.3 (L) 34.0 - 48.0 %    MCV 88.2 80.0 - 99.9 fL    MCH 30.4 26.0 - 35.0 pg    MCHC 34.5 32.0 - 34.5 %    RDW 16.9 (H) 11.5 - 15.0 fL    Platelets 411 124 - 439 E9/L    MPV 10.4 7.0 - 12.0 fL    Neutrophils % 69.2 43.0 - 80.0 %    Immature Granulocytes % 0.4 0.0 - 5.0 %    Lymphocytes % 20.3 20.0 - 42.0 %    Monocytes % 6.7 2.0 - 12.0 %    Eosinophils % 3.0 0.0 - 6.0 %    Basophils % 0.4 0.0 - 2.0 %    Neutrophils Absolute 3.51 1.80 - 7.30 E9/L    Immature Granulocytes # 0.02 E9/L    Lymphocytes Absolute 1.03 (L) 1.50 - 4.00 E9/L    Monocytes Absolute 0.34 0.10 - 0.95 E9/L    Eosinophils Absolute 0.15 0.05 - 0.50 E9/L    Basophils Absolute 0.02 0.00 - 0.20 E9/L   Comprehensive Metabolic Panel w/ Reflex to MG   Result Value Ref Range    Sodium 138 132 - 146 mmol/L    Potassium reflex Magnesium 6.7 (HH) 3.5 - 5.0 mmol/L    Chloride 94 (L) 98 - 107 mmol/L    CO2 19 (L) 22 - 29 mmol/L    Anion Gap 25 (H) 7 - 16 mmol/L    Glucose 299 (H) 74 - 99 mg/dL     (H) 6 - 20 mg/dL    Creatinine 17.2 (HH) 0.5 - 1.0 mg/dL    GFR Non-African American 3 >=60 mL/min/1.73    GFR African American 3     Calcium 9.5 8.6 - 10.2 mg/dL    Total Protein 7.9 6.4 - 8.3 g/dL    Albumin 3.9 3.5 - 5.2 g/dL    Total Bilirubin 0.6 0.0 - 1.2 mg/dL    Alkaline Phosphatase 116 (H) 35 - 104 U/L    ALT 15 0 - 32 U/L    AST 19 0 - 31 U/L   Troponin   Result Value Ref Range    Troponin, High Sensitivity 295 (H) 0 - 9 ng/L   Brain Natriuretic Peptide   Result Value Ref Range    Pro-BNP 51,169 (H) 0 - 125 pg/mL   Lactic Acid Result Value Ref Range    Lactic Acid 1.4 0.5 - 2.2 mmol/L   HCG Qualitative, Serum   Result Value Ref Range    hCG Qual NEGATIVE NEGATIVE   Basic Metabolic Panel   Result Value Ref Range    Sodium 139 132 - 146 mmol/L    Potassium 4.7 3.5 - 5.0 mmol/L    Chloride 97 (L) 98 - 107 mmol/L    CO2 19 (L) 22 - 29 mmol/L    Anion Gap 23 (H) 7 - 16 mmol/L    Glucose 377 (H) 74 - 99 mg/dL    BUN 44 (H) 6 - 20 mg/dL    Creatinine 9.8 (HH) 0.5 - 1.0 mg/dL    GFR Non-African American 5 >=60 mL/min/1.73    GFR African American 5     Calcium 8.9 8.6 - 10.2 mg/dL   Troponin   Result Value Ref Range    Troponin, High Sensitivity 248 (H) 0 - 9 ng/L   Basic Metabolic Panel   Result Value Ref Range    Sodium 139 132 - 146 mmol/L    Potassium 4.5 3.5 - 5.0 mmol/L    Chloride 96 (L) 98 - 107 mmol/L    CO2 21 (L) 22 - 29 mmol/L    Anion Gap 22 (H) 7 - 16 mmol/L    Glucose 344 (H) 74 - 99 mg/dL    BUN 48 (H) 6 - 20 mg/dL    Creatinine 10.1 (HH) 0.5 - 1.0 mg/dL    GFR Non-African American 5 >=60 mL/min/1.73    GFR African American 5     Calcium 9.0 8.6 - 10.2 mg/dL   POCT Glucose   Result Value Ref Range    Meter Glucose 375 (H) 74 - 99 mg/dL   POCT Glucose   Result Value Ref Range    Meter Glucose 355 (H) 74 - 99 mg/dL   EKG 12 Lead   Result Value Ref Range    Ventricular Rate 80 BPM    Atrial Rate 80 BPM    P-R Interval 128 ms    QRS Duration 62 ms    Q-T Interval 382 ms    QTc Calculation (Bazett) 440 ms    P Axis 41 degrees    R Axis 14 degrees    T Axis 46 degrees       Radiology:  XR CHEST PORTABLE   Final Result   Diffuse and hazy density over both lungs, suspect underlying infiltrates   versus pulmonary edema. ------------------------- NURSING NOTES AND VITALS REVIEWED ---------------------------  Date / Time Roomed:  7/28/2022  1:29 PM  ED Bed Assignment:  06/06    The nursing notes within the ED encounter and vital signs as below have been reviewed.    /63   Pulse 99   Temp 98 °F (36.7 °C)   Resp 19   Ht 4' 11\" (1.499 m)   Wt 137 lb (62.1 kg)   SpO2 97%   BMI 27.67 kg/m²   Oxygen Saturation Interpretation: Normal      ------------------------------------------ PROGRESS NOTES ------------------------------------------  12:05 PM EDT  I have spoken with the patient and family if present and discussed todays results, in addition to providing specific details for the plan of care and counseling regarding the diagnosis and prognosis. Their questions are answered at this time and they are agreeable with the plan. I discussed at length with them reasons for immediate return here for re evaluation. They will followup with their primary care provider by calling their office as soon as possible.      --------------------------------- ADDITIONAL PROVIDER NOTES ---------------------------------  At this time the patient is without objective evidence of an acute process requiring hospitalization or inpatient management. They have remained hemodynamically stable throughout their entire ED visit and are stable for discharge with outpatient follow-up. The plan has been discussed in detail and they are aware of the specific conditions for emergent return, as well as the importance of follow-up. Discharge Medication List as of 7/29/2022  2:25 AM          Diagnosis:  1. ESRD needing dialysis ([facility] [address]    2. Hyperkalemia        Disposition:  Patient's disposition: Discharge to home  Patient's condition is stable.        Mayelin Viveros MD  Resident  08/01/22 415 [facility] Sushila Carpio MD  08/05/22 0099       Ana Gambino MD  09/17/22 [id number]

## 2022-07-28 NOTE — FLOWSHEET NOTE
07/28/22 1845   Vital Signs   /76   Temp 98 °F (36.7 °C)   Heart Rate (!) 101   Resp 16   Weight   (er cart)   Pain Assessment   Pain Assessment None - Denies Pain   Post-Hemodialysis Assessment   Post-Treatment Procedures Blood returned; Access bleeding time < 10 minutes   Machine Disinfection Process Acid/Vinegar Clean;Heat Disinfect; Exterior Machine Disinfection   Rinseback Volume (ml) 300 ml   Blood Volume Processed (Liters) 60.4 l/min   Dialyzer Clearance Clear   Duration of Treatment (minutes) 210 minutes   Hemodialysis Intake (ml) 300 ml   Hemodialysis Output (ml) 4000 ml   NET Removed (ml) 3700   Tolerated Treatment Good   Patient Response to Treatment tolerated tx well, hemostasis achieved, VSS, stable at dc   Bilateral Breath Sounds Diminished

## 2022-07-29 VITALS
DIASTOLIC BLOOD PRESSURE: 63 MMHG | HEIGHT: 59 IN | WEIGHT: 137 LBS | SYSTOLIC BLOOD PRESSURE: 136 MMHG | OXYGEN SATURATION: 97 % | RESPIRATION RATE: 19 BRPM | HEART RATE: 99 BPM | BODY MASS INDEX: 27.62 KG/M2 | TEMPERATURE: 98 F

## 2022-07-29 LAB
ANION GAP SERPL CALCULATED.3IONS-SCNC: 22 MMOL/L (ref 7–16)
BUN BLDV-MCNC: 48 MG/DL (ref 6–20)
CALCIUM SERPL-MCNC: 9 MG/DL (ref 8.6–10.2)
CHLORIDE BLD-SCNC: 96 MMOL/L (ref 98–107)
CO2: 21 MMOL/L (ref 22–29)
CREAT SERPL-MCNC: 10.1 MG/DL (ref 0.5–1)
EKG ATRIAL RATE: 80 BPM
EKG P AXIS: 41 DEGREES
EKG P-R INTERVAL: 128 MS
EKG Q-T INTERVAL: 382 MS
EKG QRS DURATION: 62 MS
EKG QTC CALCULATION (BAZETT): 440 MS
EKG R AXIS: 14 DEGREES
EKG T AXIS: 46 DEGREES
EKG VENTRICULAR RATE: 80 BPM
GFR AFRICAN AMERICAN: 5
GFR NON-AFRICAN AMERICAN: 5 ML/MIN/1.73
GLUCOSE BLD-MCNC: 344 MG/DL (ref 74–99)
METER GLUCOSE: 355 MG/DL (ref 74–99)
METER GLUCOSE: 375 MG/DL (ref 74–99)
POTASSIUM SERPL-SCNC: 4.5 MMOL/L (ref 3.5–5)
SODIUM BLD-SCNC: 139 MMOL/L (ref 132–146)

## 2022-07-29 PROCEDURE — 6370000000 HC RX 637 (ALT 250 FOR IP): Performed by: EMERGENCY MEDICINE

## 2022-07-29 PROCEDURE — 82962 GLUCOSE BLOOD TEST: CPT

## 2022-07-29 PROCEDURE — 80048 BASIC METABOLIC PNL TOTAL CA: CPT

## 2022-07-29 RX ADMIN — Medication 5 UNITS: at 00:25

## 2022-07-29 NOTE — ED NOTES
Patient given verbal and written discharge instructions. Patients questions answered.  Patient discharged per order      Adal Gallegos RN  07/29/22 [id number]

## 2022-08-29 ENCOUNTER — OFFICE VISIT (OUTPATIENT)
Dept: FAMILY MEDICINE CLINIC | Age: 37
End: 2022-08-29
Payer: MEDICARE

## 2022-08-29 VITALS
BODY MASS INDEX: 28.83 KG/M2 | WEIGHT: 143 LBS | SYSTOLIC BLOOD PRESSURE: 124 MMHG | HEART RATE: 97 BPM | HEIGHT: 59 IN | DIASTOLIC BLOOD PRESSURE: 68 MMHG | OXYGEN SATURATION: 96 % | RESPIRATION RATE: 18 BRPM | TEMPERATURE: 97.4 F

## 2022-08-29 DIAGNOSIS — N18.6 TYPE 1 DIABETES MELLITUS WITH CHRONIC KIDNEY DISEASE ON CHRONIC DIALYSIS (HCC): ICD-10-CM

## 2022-08-29 DIAGNOSIS — I10 HTN (HYPERTENSION), BENIGN: Primary | Chronic | ICD-10-CM

## 2022-08-29 DIAGNOSIS — N18.6 ESRD (END STAGE RENAL DISEASE) ON DIALYSIS (HCC): Chronic | ICD-10-CM

## 2022-08-29 DIAGNOSIS — Z99.2 ESRD (END STAGE RENAL DISEASE) ON DIALYSIS (HCC): Chronic | ICD-10-CM

## 2022-08-29 DIAGNOSIS — E10.22 TYPE 1 DIABETES MELLITUS WITH CHRONIC KIDNEY DISEASE ON CHRONIC DIALYSIS (HCC): ICD-10-CM

## 2022-08-29 DIAGNOSIS — Z99.2 TYPE 1 DIABETES MELLITUS WITH CHRONIC KIDNEY DISEASE ON CHRONIC DIALYSIS (HCC): ICD-10-CM

## 2022-08-29 DIAGNOSIS — L30.9 ECZEMA, UNSPECIFIED TYPE: ICD-10-CM

## 2022-08-29 DIAGNOSIS — R05.8 COUGH PRODUCTIVE OF CLEAR SPUTUM: ICD-10-CM

## 2022-08-29 PROCEDURE — 3046F HEMOGLOBIN A1C LEVEL >9.0%: CPT | Performed by: INTERNAL MEDICINE

## 2022-08-29 PROCEDURE — G8417 CALC BMI ABV UP PARAM F/U: HCPCS | Performed by: INTERNAL MEDICINE

## 2022-08-29 PROCEDURE — 99214 OFFICE O/P EST MOD 30 MIN: CPT | Performed by: INTERNAL MEDICINE

## 2022-08-29 PROCEDURE — G8427 DOCREV CUR MEDS BY ELIG CLIN: HCPCS | Performed by: INTERNAL MEDICINE

## 2022-08-29 PROCEDURE — 4004F PT TOBACCO SCREEN RCVD TLK: CPT | Performed by: INTERNAL MEDICINE

## 2022-08-29 PROCEDURE — 2022F DILAT RTA XM EVC RTNOPTHY: CPT | Performed by: INTERNAL MEDICINE

## 2022-08-29 RX ORDER — CLINDAMYCIN PHOSPHATE 10 UG/ML
LOTION TOPICAL
Qty: 60 G | Refills: 2 | Status: SHIPPED | OUTPATIENT
Start: 2022-08-29 | End: 2022-09-28

## 2022-08-29 RX ORDER — GUAIFENESIN AND CODEINE PHOSPHATE 100; 10 MG/5ML; MG/5ML
10 SOLUTION ORAL 2 TIMES DAILY PRN
Qty: 180 ML | Refills: 0 | Status: SHIPPED | OUTPATIENT
Start: 2022-08-29 | End: 2022-09-12

## 2022-08-29 RX ORDER — BROMPHENIRAMINE MALEATE, PSEUDOEPHEDRINE HYDROCHLORIDE, AND DEXTROMETHORPHAN HYDROBROMIDE 2; 30; 10 MG/5ML; MG/5ML; MG/5ML
10 SYRUP ORAL 4 TIMES DAILY PRN
Qty: 473 ML | Refills: 2 | Status: CANCELLED | OUTPATIENT
Start: 2022-08-29

## 2022-08-29 SDOH — ECONOMIC STABILITY: FOOD INSECURITY: WITHIN THE PAST 12 MONTHS, THE FOOD YOU BOUGHT JUST DIDN'T LAST AND YOU DIDN'T HAVE MONEY TO GET MORE.: NEVER TRUE

## 2022-08-29 SDOH — ECONOMIC STABILITY: FOOD INSECURITY: WITHIN THE PAST 12 MONTHS, YOU WORRIED THAT YOUR FOOD WOULD RUN OUT BEFORE YOU GOT MONEY TO BUY MORE.: NEVER TRUE

## 2022-08-29 ASSESSMENT — PATIENT HEALTH QUESTIONNAIRE - PHQ9
SUM OF ALL RESPONSES TO PHQ QUESTIONS 1-9: 0
2. FEELING DOWN, DEPRESSED OR HOPELESS: 0
SUM OF ALL RESPONSES TO PHQ9 QUESTIONS 1 & 2: 0
1. LITTLE INTEREST OR PLEASURE IN DOING THINGS: 0
SUM OF ALL RESPONSES TO PHQ QUESTIONS 1-9: 0

## 2022-08-29 ASSESSMENT — SOCIAL DETERMINANTS OF HEALTH (SDOH): HOW HARD IS IT FOR YOU TO PAY FOR THE VERY BASICS LIKE FOOD, HOUSING, MEDICAL CARE, AND HEATING?: NOT HARD AT ALL

## 2022-08-29 NOTE — PROGRESS NOTES
[facility] PRIMARY CARE  [address] [place] [address]  Dept: [phone]  Dept Fax: [phone]     NAME: Vandana Hayes        :  1985        MRN:  [id number]    Chief Complaint   Patient presents with    Follow-up     ESRD / states that she has been doing good / needs paperwork completed for drivers license renewal     Cough     Not coughing now / States that she would like to have a cough med on hand for when it starts. Subjective     History of Present Illness  Vandana Hayes is a 44 y.o. female who presents today for routine follow up and medication refill. A1c was check at dialysis  - will have them fax over lab results. Have paperwork needing completed so that she can renew her drivers license. Overall doing very well. Review of Systems  Please see HPI above. All bolded are positive.   Gen: fever, chills, fatigue, weakness, diaphoresis, unintentional weight change  Head: headache, vision change, hearing loss  Chest: chest pain/heaviness, palpitations  Lungs: shortness of breath, wheezing, coughing, hemoptysis  Abdomen: abdominal pain, nausea, vomiting, diarrhea, constipation, melena, hematochezia, hematemesis, loss of appetite  Extremities: lower extremity edema, myalgias, arthralgias  Urinary: dysuria, hematuria, weak flow, increase in frequency  Neurologic: lightheadedness, dizziness, confusion, syncope  Endocrine: polydipsia, polyuria, heat or cold intolerance  Psychiatric: depression, suicidal ideation, anxiety  Derm: Rashes, ulcers, burns    Past Medical Hx:  Past Medical History:   Diagnosis Date    [facility] kidney [facility]) [facility] 2016    AVF (arteriovenous fistula) ([facility] 2018    let arm    Chronic kidney disease     Dialysis AV fistula malfunction, initial encounter ([facility] 2019    DM type 1 (diabetes mellitus, type 1) ([facility]     Encounter regarding vascular access for dialysis for ESRD ([facility] 2018    ESRD (end stage renal disease) ([facility]     Hemodialysis patient ([facility]     amrita dawson  / graft in left arm    Hypertension     Tobacco abuse 2016       Past Surgical Hx:  Past Surgical History:   Procedure Laterality Date     SECTION      CYST INCISION AND DRAINAGE  2011    perirectal abscess. [facility].  Dr. Jacob Rollins Left 2019    LEFT ARM FISTULAGRAM, BALLOON ANGIOPLASTY performed by Ronald Hernandez MD at [address] [employer] [facility]      FRACTURE SURGERY      fracture right ulnar, left tibia, and pelvis    OTHER SURGICAL HISTORY      open reduction internal fixation left radial shaft    OTHER SURGICAL HISTORY  2016    pericardial window    OTHER SURGICAL HISTORY  2016     r tesio insertion  / remove 2018    OTHER SURGICAL HISTORY Left 2017    insertion a-v fistula left arm    MA ANASTOMOSIS,AV,ANY SITE Left 2018    REVISION AV FISTULA - LEFT ARM performed by Ronald Hernandez MD at [place] [address] NON-TUNNEL CV CATH N/A 5/10/2018    TESIO CATHETER INSERTION performed by Earlene Burgess MD at [address] [place] [address] PRERETINAL MEMBRANE Left 2018    PARS PLANA VITRECTOMY 25 GAUGE, VITREOUS HEMORRHAGE REMOVAL, ENDO LASER, AIR/FLUID  EXCHANGE LEFT EYE performed by Teresa Stevens MD at [address] [place] [address] N/A 2021    PERINEAL ABCESS INCISION AND DRAINAGE (LITHOTOMY) performed by Greg Drake MD at [facility] OR       Family Hx:  Family History   Problem Relation Age of Onset    Stroke Mother     Cancer Father     Diabetes Paternal Aunt        Social Hx:  Social History     Tobacco Use    Smoking status: Light Smoker     Years: 15.00     Types: Cigarettes    Smokeless tobacco: Never    Tobacco comments:     [facility]   Substance Use Topics    Alcohol use: Not Currently     Alcohol/week: 0.0 standard drinks       Home Medications:  Current Outpatient Medications   Medication Sig Dispense Refill    guaiFENesin-codeine (TUSSI-ORGANIDIN NR) 100-10 MG/5ML syrup Take 10 mLs by mouth 2 times daily as needed for Cough or Congestion for up to 14 days. 180 mL 0    clindamycin (CLEOCIN T) 1 % lotion Apply topically 2 times daily. 60 g 2    famotidine (PEPCID) 20 MG tablet Take 1 tablet by mouth every morning 30 tablet 3    albuterol (PROVENTIL) (2.5 MG/3ML) 0.083% nebulizer solution Take 3 mLs by nebulization every 6 hours as needed for Wheezing or Shortness of Breath 90 each 3    clotrimazole-betamethasone (LOTRISONE) 1-0.05 % cream Apply topically 2 times daily. 45 g 5    bacitracin zinc 500 UNIT/GM ointment Apply topically daily Apply topically 2 times daily.  30 g 2    Probiotic Product (PROBIOTIC ADVANCED PO) Take by mouth      Folic Acid 0.8 MG CAPS Take by mouth      Cholecalciferol (VITAMIN D3) 50 MCG (2000 UT) CAPS Take 1 capsule by mouth daily 30 capsule 1    brompheniramine-pseudoephedrine-DM (BROMFED DM) 2-30-10 MG/5ML syrup Take 10 mLs by mouth 4 times daily as needed for Congestion or Cough 473 mL 2    albuterol sulfate  (90 Base) MCG/ACT inhaler Inhale 2 puffs into the lungs every 4-6 hours as needed for Wheezing or Shortness of Breath 1 Inhaler 2    B Complex-C-Folic Acid (YOLANDA-JACOB) TABS TAKE 1 TABLET BY MOUTH DAILY WITH SUPPER 30 tablet 5    hydrALAZINE (APRESOLINE) 50 MG tablet Take 1 tablet by mouth every 8 hours (Patient taking differently: Take 25 mg by mouth every 8 hours) 90 tablet 3    Insulin Glargine, 2 Unit Dial, (TOUJEO MAX SOLOSTAR) 300 UNIT/ML SOPN Inject 16 Units into the skin nightly       insulin lispro, 1 Unit Dial, 100 UNIT/ML SOPN Inject 10 Units into the skin 3 times daily (before meals) *Plus Sliding Scale*      medroxyPROGESTERone (DEPO-PROVERA) 150 MG/ML injection Inject 150 mg into the muscle every 3 months   3    carvedilol (COREG) 25 MG tablet Take 50 mg by mouth every morning 2 tabs am and one tab pm  5    buPROPion (WELLBUTRIN SR) 100 MG extended release tablet Take 1 tablet by mouth every 48 hours 15 tablet 5     No current facility-administered medications for this visit. Allergies:  No Known Allergies    Objective     Vitals:    08/29/22 1309   BP: 124/68   Pulse: 97   Resp: 18   Temp: 97.4 °F (36.3 °C)   TempSrc: Temporal   SpO2: 96%   Weight: 143 lb (64.9 kg)   Height: 4' 11\" (1.499 m)        Physical Exam  General: Awake, alert, and oriented to person, place, time, and purpose, appears stated age and cooperative, No acute distress  Head: Normocephalic, atraumatic  Eyes: conjunctivae/corneas clear, EOMI  Mouth: Mucous membranes moist with no pharyngeal exudate or erythema  Neck: no JVD, no adenopathy, no carotid bruit, supple, symmetrical, trachea midline  Back: symmetric, ROM normal, No CVA tenderness. Lungs: clear to auscultation bilaterally without wheezes, rales, or rhonchi  Heart: regular rate and rhythm, S1, S2 normal, no murmur, click, rub or gallop  Abdomen: soft, non-tender; bowel sounds normal; no masses,  no organomegaly  Extremities: atraumatic, no cyanosis, no edema, fistula in LUE with palpable thrill  Skin: color, texture, turgor within normal limits.  No rashes or lesions   Neurologic:speech appropriate, moves all 4 extremities, normal muscle strength and tone, CN 2-12 grossly intact    Labs:  Lab Results   Component Value Date    WBC 5.1 07/28/2022    HGB 10.8 (L) 07/28/2022    HCT 31.3 (L) 07/28/2022     07/28/2022     07/29/2022    K 4.5 07/29/2022    CL 96 (L) 07/29/2022    CREATININE 10.1 (HH) 07/29/2022    BUN 48 (H) 07/29/2022    CO2 21 (L) 07/29/2022    GLUCOSE 344 (H) 07/29/2022    ALT 15 07/28/2022    AST 19 07/28/2022    INR 1.1 11/26/2018     Lab Results   Component Value Date    TSH 3.850 01/27/2021     Lab Results   Component Value Date    TRIG 68 10/08/2013     Lab Results   Component Value Date    HDL 40 01/29/2021    HDL 50.0 10/08/2013 Lab Results   Component Value Date    LDLCALC 41 01/29/2021    LDLCALC 91 10/08/2013     Lab Results   Component Value Date    LABA1C 9.0 (H) 03/18/2022     Lab Results   Component Value Date    INR 1.1 11/26/2018    INR 1.0 07/17/2018    INR 1.0 02/17/2017    PROTIME 12.2 11/26/2018    PROTIME 11.8 07/17/2018    PROTIME 10.6 02/17/2017      *All recent labs were reviewed. Please see electronic chart for a more comprehensive set of labs    Radiology:  No results found. Assessment and Plan     Patient is a 44 y.o. female who presented to the office for follow up. Full problem list is as follows:  Patient Active Problem List   Diagnosis    ESRD (end stage renal disease) on dialysis ([address]    HTN (hypertension), benign    Uncontrolled type 1 diabetes mellitus with hyperglycemia ([address]    Chronic deep vein thrombosis (DVT) of right upper extremity (HCC)    Facial cellulitis    Acute hyperkalemia    Nasal polyp    Periorbital cellulitis of right eye    Periorbital cellulitis    Vitamin B deficiency, unspecified    Abscess    Nasal congestion    Dry cough    Hidradenitis suppurativa    Symptomatic anemia    Anemia    Acute congestive heart failure (HCC)    Volume overload    Gall stone    Non-compliance    Metabolic encephalopathy    Groin abscess    Hypoxia    Hypotension    History of venous thromboembolism    Dental abscess    Anemia of chronic renal failure, stage 5 ([address]       Andria Good was seen today for follow-up and cough. Diagnoses and all orders for this visit:    HTN (hypertension), benign    ESRD (end stage renal disease) on dialysis ([address]    Type 1 diabetes mellitus with chronic kidney disease on chronic dialysis [facility])  -     Aurelio Newman MD, Endocrinology, [place]  -     External Referral To Dermatology    Cough productive of clear sputum  -     guaiFENesin-codeine (TUSSI-ORGANIDIN NR) 100-10 MG/5ML syrup;  Take 10 mLs by mouth 2 times daily as needed for Cough or Congestion for up to 14 days. Eczema, unspecified type  -     clindamycin (CLEOCIN T) 1 % lotion; Apply topically 2 times daily. On this date, 8/29/22 I have spent 30 minutes reviewing previous notes, test results and face to face with the patient discussing the diagnosis and importance of compliance with the treatment plan as well as documenting on the day of the visit. Educational materials and/or home exercises printed for patient's review and were included in patient instructions on his/her After Visit Summary and given to patient at the end of visit. Counseled regarding above diagnosis, including possible risks and complications, especially if left uncontrolled. Counseled regarding the possible side effects, risks, benefits and alternatives to treatment; patient and/or guardian verbalizes understanding, agrees, feels comfortable with and wishes to proceed with above treatment plan. Advised patient to call Alexa Zafar new medication issues, and read all Rx info from pharmacy to assure aware of all possible risks and side effects of any medication before taking. Reviewed age and gender appropriate health screening exams and vaccinations. Advised patient regarding importance of keeping up with recommended health maintenance and to schedule as soon as possible if overdue, as this is important in assessing for undiagnosed pathology, especially cancer, as well as protecting against potentially harmful/life threatening disease. Patient verbalizes understanding and agrees with above counseling, assessment and plan. All questions answered.     Malena Park,

## 2022-08-29 NOTE — PATIENT INSTRUCTIONS
[facility] - Pauleen Cheadle, MD  [address] [facility], [address] [place] [address]  [phone]    [facility]  [address] [facility]  [phone] [id number] [place] [address],[address]  [facility]  [phone]

## 2022-08-30 LAB — FERRITIN: 180 NG/ML (ref 9–150)

## 2022-09-03 PROBLEM — E87.70 VOLUME OVERLOAD: Status: RESOLVED | Noted: 2021-01-27 | Resolved: 2022-09-03

## 2022-09-03 PROBLEM — L02.214 GROIN ABSCESS: Status: RESOLVED | Noted: 2021-05-19 | Resolved: 2022-09-03

## 2022-10-20 ENCOUNTER — PATIENT MESSAGE (OUTPATIENT)
Dept: FAMILY MEDICINE CLINIC | Age: 37
End: 2022-10-20

## 2022-10-20 NOTE — TELEPHONE ENCOUNTER
From: Sada Allen  To: Dr. Hester Profit: 10/20/2022 9:13 AM EDT  Subject: Important paperwork     Good morning Dr. Chayo Chapa I need to know if you could write me a letter stating that I need help with my child due to me having a medical condition and I need this letter so that I maybe able to get help with my child when I may need it. If you have any questions or concerns please feel. free to give me a call or send a message. If you can email it to me my email address is [email or web address] and just let me know. I'm not trying to alexandre you with this but I laly need it As Soon Ss Possible so I can get the help with my daughter. Thank you in advance so much.

## 2022-10-21 NOTE — TELEPHONE ENCOUNTER
Spoke with Dr. Kilo Martinez again concerning pt to explain more of what pt is asking and she will write letter and she can  letter then

## 2022-10-21 NOTE — TELEPHONE ENCOUNTER
Pt returned call and stated she is in need of a Home health nurse to come and care for daughter due to daughter having cerebral palsy and that due to her the parents medical condition is in need of additional help with daughter, home health care. Please advise and pt would like to pick letter up from office.

## 2022-10-25 DIAGNOSIS — K21.9 GASTROESOPHAGEAL REFLUX DISEASE WITHOUT ESOPHAGITIS: ICD-10-CM

## 2022-10-26 RX ORDER — FAMOTIDINE 20 MG/1
20 TABLET, FILM COATED ORAL EVERY MORNING
Qty: 30 TABLET | Refills: 3 | Status: SHIPPED | OUTPATIENT
Start: 2022-10-26

## 2022-10-26 NOTE — TELEPHONE ENCOUNTER
Last Appointment:  8/29/2022  Future Appointments   Date Time Provider Titus Potter   11/30/2022 12:30 PM Ally Ruth [facility]   1/16/2023  9:00 AM Brennan Madrid MD [facility]

## 2022-10-27 DIAGNOSIS — R09.81 NASAL CONGESTION: ICD-10-CM

## 2022-10-27 DIAGNOSIS — R05.8 DRY COUGH: ICD-10-CM

## 2022-10-28 RX ORDER — BROMPHENIRAMINE MALEATE, PSEUDOEPHEDRINE HYDROCHLORIDE, AND DEXTROMETHORPHAN HYDROBROMIDE 2; 30; 10 MG/5ML; MG/5ML; MG/5ML
10 SYRUP ORAL 4 TIMES DAILY PRN
Qty: 473 ML | Refills: 2 | Status: SHIPPED | OUTPATIENT
Start: 2022-10-28

## 2022-10-28 NOTE — TELEPHONE ENCOUNTER
Last Appointment:  8/17/2021  Future Appointments   Date Time Provider Titus Potter   11/30/2022 12:30 PM Rhona Coyne [facility]   1/16/2023  9:00 AM Brennan Palomino MD [facility]

## 2022-10-31 DIAGNOSIS — K21.9 GASTROESOPHAGEAL REFLUX DISEASE WITHOUT ESOPHAGITIS: ICD-10-CM

## 2022-10-31 RX ORDER — FAMOTIDINE 20 MG/1
20 TABLET, FILM COATED ORAL EVERY MORNING
Qty: 30 TABLET | Refills: 3 | OUTPATIENT
Start: 2022-10-31

## 2022-11-30 ENCOUNTER — OFFICE VISIT (OUTPATIENT)
Dept: FAMILY MEDICINE CLINIC | Age: 37
End: 2022-11-30
Payer: MEDICARE

## 2022-11-30 VITALS
BODY MASS INDEX: 30.24 KG/M2 | HEART RATE: 103 BPM | DIASTOLIC BLOOD PRESSURE: 60 MMHG | TEMPERATURE: 97.2 F | WEIGHT: 150 LBS | OXYGEN SATURATION: 94 % | RESPIRATION RATE: 16 BRPM | SYSTOLIC BLOOD PRESSURE: 126 MMHG | HEIGHT: 59 IN

## 2022-11-30 DIAGNOSIS — I50.22 CHRONIC SYSTOLIC (CONGESTIVE) HEART FAILURE (HCC): ICD-10-CM

## 2022-11-30 DIAGNOSIS — N18.6 TYPE 1 DIABETES MELLITUS WITH CHRONIC KIDNEY DISEASE ON CHRONIC DIALYSIS (HCC): Primary | ICD-10-CM

## 2022-11-30 DIAGNOSIS — E10.22 TYPE 1 DIABETES MELLITUS WITH CHRONIC KIDNEY DISEASE ON CHRONIC DIALYSIS (HCC): Primary | ICD-10-CM

## 2022-11-30 DIAGNOSIS — Z99.2 TYPE 1 DIABETES MELLITUS WITH CHRONIC KIDNEY DISEASE ON CHRONIC DIALYSIS (HCC): Primary | ICD-10-CM

## 2022-11-30 DIAGNOSIS — R09.81 NASAL CONGESTION: ICD-10-CM

## 2022-11-30 DIAGNOSIS — R05.8 PRODUCTIVE COUGH: ICD-10-CM

## 2022-11-30 DIAGNOSIS — K08.89 PAIN, DENTAL: ICD-10-CM

## 2022-11-30 DIAGNOSIS — K04.7 DENTAL INFECTION: ICD-10-CM

## 2022-11-30 LAB — HBA1C MFR BLD: 7.7 %

## 2022-11-30 PROCEDURE — 3074F SYST BP LT 130 MM HG: CPT | Performed by: INTERNAL MEDICINE

## 2022-11-30 PROCEDURE — 4004F PT TOBACCO SCREEN RCVD TLK: CPT | Performed by: INTERNAL MEDICINE

## 2022-11-30 PROCEDURE — 2022F DILAT RTA XM EVC RTNOPTHY: CPT | Performed by: INTERNAL MEDICINE

## 2022-11-30 PROCEDURE — G8417 CALC BMI ABV UP PARAM F/U: HCPCS | Performed by: INTERNAL MEDICINE

## 2022-11-30 PROCEDURE — 3078F DIAST BP <80 MM HG: CPT | Performed by: INTERNAL MEDICINE

## 2022-11-30 PROCEDURE — 99214 OFFICE O/P EST MOD 30 MIN: CPT | Performed by: INTERNAL MEDICINE

## 2022-11-30 PROCEDURE — G8484 FLU IMMUNIZE NO ADMIN: HCPCS | Performed by: INTERNAL MEDICINE

## 2022-11-30 PROCEDURE — 83036 HEMOGLOBIN GLYCOSYLATED A1C: CPT | Performed by: INTERNAL MEDICINE

## 2022-11-30 PROCEDURE — G8427 DOCREV CUR MEDS BY ELIG CLIN: HCPCS | Performed by: INTERNAL MEDICINE

## 2022-11-30 PROCEDURE — 3051F HG A1C>EQUAL 7.0%<8.0%: CPT | Performed by: INTERNAL MEDICINE

## 2022-11-30 RX ORDER — INSULIN LISPRO 100 [IU]/ML
10 INJECTION, SOLUTION INTRAVENOUS; SUBCUTANEOUS
Status: CANCELLED | OUTPATIENT
Start: 2022-11-30

## 2022-11-30 RX ORDER — INSULIN LISPRO 100 [IU]/ML
10 INJECTION, SOLUTION INTRAVENOUS; SUBCUTANEOUS
Qty: 5 ADJUSTABLE DOSE PRE-FILLED PEN SYRINGE | Refills: 5 | Status: SHIPPED
Start: 2022-11-30 | End: 2022-11-30

## 2022-11-30 RX ORDER — GUAIFENESIN AND CODEINE PHOSPHATE 100; 10 MG/5ML; MG/5ML
10 SOLUTION ORAL 2 TIMES DAILY PRN
Qty: 180 ML | Refills: 0 | Status: SHIPPED | OUTPATIENT
Start: 2022-11-30 | End: 2022-12-14

## 2022-11-30 RX ORDER — INSULIN LISPRO 100 [IU]/ML
10 INJECTION, SOLUTION INTRAVENOUS; SUBCUTANEOUS
Qty: 5 ADJUSTABLE DOSE PRE-FILLED PEN SYRINGE | Refills: 5 | Status: SHIPPED | OUTPATIENT
Start: 2022-11-30

## 2022-11-30 RX ORDER — ACETAMINOPHEN 500 MG
500 TABLET ORAL EVERY 6 HOURS PRN
Qty: 120 TABLET | Refills: 1 | Status: SHIPPED | OUTPATIENT
Start: 2022-11-30

## 2022-11-30 RX ORDER — AMOXICILLIN 500 MG/1
500 CAPSULE ORAL 3 TIMES DAILY
Qty: 30 CAPSULE | Refills: 0 | Status: SHIPPED | OUTPATIENT
Start: 2022-11-30 | End: 2022-12-10

## 2022-11-30 NOTE — PROGRESS NOTES
[facility] PRIMARY CARE  [address] [address]  Dept: [phone]  Dept Fax: [phone]     NAME: Edgardo Sandifer        :  1985        MRN:  [id number]    Chief Complaint   Patient presents with    Diabetes     She said she had an A1C at dialysis this week. Mass     Right elbow. She has had it for about 2 weeks. It has been draining. Dental Pain     Would like to get an antibiotic. Cough     Started about 10 days ago. She has not tried any otc meds. Hypertension       Subjective     History of Present Illness  Edgardo Sandifer is a 44 y.o. female who presents today for routine follow up and above listed concerns    Patient has had a cough for over a week now, been using the bromafed just a couple times since the cough started. Her daughter was recently ill with similar symptoms. Dental pain ongoing for the last couple weeks. Patient does have an appointment scheduled with her dentist although they are not able to get her in until mid January. She has needed an antibiotic for dental infections in the past and has been on Amoxil previously which worked well for her    Review of Systems  Please see HPI above. All bolded are positive.   Gen: fever, chills, fatigue, weakness, diaphoresis, unintentional weight change  Head: headache, vision change, hearing loss  Chest: chest pain/heaviness, palpitations  Lungs: shortness of breath, wheezing, coughing, hemoptysis  Abdomen: abdominal pain, nausea, vomiting, diarrhea, constipation, melena, hematochezia, hematemesis, loss of appetite  Extremities: lower extremity edema, myalgias, arthralgias  Urinary: dysuria, hematuria, weak flow, increase in frequency  Neurologic: lightheadedness, dizziness, confusion, syncope  Endocrine: polydipsia, polyuria, heat or cold intolerance  Psychiatric: depression, suicidal ideation, anxiety  Derm: Rashes, ulcers, burns    Past Medical Hx:  Past Medical History:   Diagnosis Date    JOLLY (acute kidney injury) ([facility] 2016    AVF (arteriovenous fistula) ([facility] 2018    let arm    Chronic kidney disease     Dialysis AV fistula malfunction, initial encounter [facility] 2019    DM type 1 (diabetes mellitus, type 1) ([facility]     Encounter regarding vascular access for dialysis for ESRD ([facility] 2018    ESRD (end stage renal disease) ([facility]     Hemodialysis patient ([facility]     amrita dawson  / graft in left arm    Hypertension     Tobacco abuse 2016       Past Surgical Hx:  Past Surgical History:   Procedure Laterality Date     SECTION      CYST INCISION AND DRAINAGE  2011    perirectal abscess. [facility].  Dr. Matt Jacobson Left 2019    LEFT ARM FISTULAGRAM, BALLOON ANGIOPLASTY performed by Gerald Smith MD at [address] [facility] [facility]      FRACTURE SURGERY      fracture right ulnar, left tibia, and pelvis    OTHER SURGICAL HISTORY      open reduction internal fixation left radial shaft    OTHER SURGICAL HISTORY  2016    pericardial window    OTHER SURGICAL HISTORY  2016     r tesio insertion  / remove 2018    OTHER SURGICAL HISTORY Left 2017    insertion a-v fistula left arm    UT ANASTOMOSIS,AV,ANY SITE Left 2018    REVISION AV FISTULA - LEFT ARM performed by Gerald Smith MD at [place] [address] NON-TUNNEL CV CATH N/A 5/10/2018    TESIO CATHETER INSERTION performed by Fior Aquino MD at [address] [place] [address] PRERETINAL MEMBRANE Left 2018    PARS PLANA VITRECTOMY 25 GAUGE, VITREOUS HEMORRHAGE REMOVAL, ENDO LASER, AIR/FLUID  EXCHANGE LEFT EYE performed by Ely Martinez MD at [address] [facility] N/A 2021    PERINEAL ABCESS INCISION AND DRAINAGE (LITHOTOMY) performed by Verenice Weaver MD at [facility] [place]       Family Hx:  Family History   Problem Relation Age of Onset    Stroke Mother     Cancer Father     Diabetes Paternal Aunt        Social Hx:  Social History     Tobacco Use    Smoking status: Light Smoker     Years: 15.00     Types: Cigarettes    Smokeless tobacco: Never   Substance Use Topics    Alcohol use: Not Currently     Alcohol/week: 0.0 standard drinks       Home Medications:  Current Outpatient Medications   Medication Sig Dispense Refill    guaiFENesin-codeine (TUSSI-ORGANIDIN NR) 100-10 MG/5ML syrup Take 10 mLs by mouth 2 times daily as needed for Cough or Congestion for up to 14 days. 180 mL 0    acetaminophen (TYLENOL) 500 MG tablet Take 1 tablet by mouth every 6 hours as needed for Pain 120 tablet 1    amoxicillin (AMOXIL) 500 MG capsule Take 1 capsule by mouth 3 times daily for 10 days 30 capsule 0    insulin lispro, 1 Unit Dial, (HUMALOG/ADMELOG) 100 UNIT/ML SOPN Inject 10 Units into the skin 3 times daily (before meals) *Plus Sliding Scale* MAX 50 units a day 5 Adjustable Dose Pre-filled Pen Syringe 5    brompheniramine-pseudoephedrine-DM (BROMFED DM) 2-30-10 MG/5ML syrup Take 10 mLs by mouth 4 times daily as needed for Congestion or Cough 473 mL 2    famotidine (PEPCID) 20 MG tablet TAKE 1 TABLET BY MOUTH EVERY MORNING 30 tablet 3    albuterol (PROVENTIL) (2.5 MG/3ML) 0.083% nebulizer solution Take 3 mLs by nebulization every 6 hours as needed for Wheezing or Shortness of Breath 90 each 3    clotrimazole-betamethasone (LOTRISONE) 1-0.05 % cream Apply topically 2 times daily. 45 g 5    bacitracin zinc 500 UNIT/GM ointment Apply topically daily Apply topically 2 times daily.  30 g 2    Probiotic Product (PROBIOTIC ADVANCED PO) Take by mouth      Folic Acid 0.8 MG CAPS Take by mouth      Cholecalciferol (VITAMIN D3) 50 MCG (2000 UT) CAPS Take 1 capsule by mouth daily 30 capsule 1    albuterol sulfate  (90 Base) MCG/ACT inhaler Inhale 2 puffs into the lungs every 4-6 hours as needed for Wheezing or Shortness of Breath 1 Inhaler 2    hydrALAZINE (APRESOLINE) 50 MG tablet Take 1 tablet by mouth every 8 hours (Patient taking differently: Take 25 mg by mouth every 8 hours) 90 tablet 3    Insulin Glargine, 2 Unit Dial, (TOUJEO MAX SOLOSTAR) 300 UNIT/ML SOPN Inject 16 Units into the skin nightly       medroxyPROGESTERone (DEPO-PROVERA) 150 MG/ML injection Inject 150 mg into the muscle every 3 months   3    carvedilol (COREG) 25 MG tablet Take 50 mg by mouth every morning 2 tabs am and one tab pm  5    buPROPion (WELLBUTRIN SR) 100 MG extended release tablet Take 1 tablet by mouth every 48 hours 15 tablet 5     No current facility-administered medications for this visit. Allergies:  No Known Allergies    Objective     Vitals:    11/30/22 1249   BP: 126/60   Pulse: (!) 103   Resp: 16   Temp: 97.2 °F (36.2 °C)   TempSrc: Temporal   SpO2: 94%   Weight: 150 lb (68 kg)   Height: 4' 11\" (1.499 m)        Physical Exam  General: Awake, alert, and oriented to person, place, time, and purpose, appears stated age and cooperative, No acute distress  Head: Normocephalic, atraumatic  Eyes: conjunctivae/corneas clear, EOMI  Mouth: Mucous membranes moist with no pharyngeal exudate or erythema  Neck: no JVD, no adenopathy, no carotid bruit, supple, symmetrical, trachea midline  Back: symmetric, ROM normal, No CVA tenderness. Lungs: clear to auscultation bilaterally without wheezes, rales, or rhonchi  Heart: regular rate and rhythm, S1, S2 normal, no murmur, click, rub or gallop  Abdomen: soft, non-tender; bowel sounds normal; no masses,  no organomegaly  Extremities: atraumatic, no cyanosis, no edema  Skin: color, texture, turgor within normal limits. Healed lesion to right elbow, likely prior abscess.   No current fluctuance, erythema, or drainage  Neurologic:speech appropriate, moves all 4 extremities, normal muscle strength and tone, CN 2-12 grossly intact    Labs:  Lab Results   Component Value Date    WBC 5.1 07/28/2022    HGB 10.8 (L) 07/28/2022    HCT 31.3 (L) 07/28/2022     07/28/2022     07/29/2022    K 4.5 07/29/2022    CL 96 (L) 07/29/2022    CREATININE 10.1 (HH) 07/29/2022    BUN 48 (H) 07/29/2022    CO2 21 (L) 07/29/2022    GLUCOSE 344 (H) 07/29/2022    ALT 15 07/28/2022    AST 19 07/28/2022    INR 1.1 11/26/2018     Lab Results   Component Value Date    TSH 3.850 01/27/2021     Lab Results   Component Value Date    TRIG 68 10/08/2013     Lab Results   Component Value Date    HDL 40 01/29/2021    HDL 50.0 10/08/2013     Lab Results   Component Value Date    LDLCALC 41 01/29/2021    LDLCALC 91 10/08/2013     Lab Results   Component Value Date    LABA1C 7.7 11/30/2022     Lab Results   Component Value Date    INR 1.1 11/26/2018    INR 1.0 07/17/2018    INR 1.0 02/17/2017    PROTIME 12.2 11/26/2018    PROTIME 11.8 07/17/2018    PROTIME 10.6 02/17/2017      *All recent labs were reviewed. Please see electronic chart for a more comprehensive set of labs    Radiology:  No results found. Assessment and Plan     Patient is a 44 y.o. female who presented to the office for follow up.    Full problem list is as follows:  Patient Active Problem List   Diagnosis    Type 1 diabetes mellitus with chronic kidney disease on chronic dialysis ([facility]    ESRD (end stage renal disease) on dialysis ([address]    HTN (hypertension), benign    Uncontrolled type 1 diabetes mellitus with hyperglycemia ([address]    Chronic deep vein thrombosis (DVT) of right upper extremity (HCC)    Facial cellulitis    Acute hyperkalemia    Nasal polyp    Periorbital cellulitis of right eye    Periorbital cellulitis    Vitamin B deficiency, unspecified    Abscess    Nasal congestion    Dry cough    Hidradenitis suppurativa    Symptomatic anemia    Anemia    Acute congestive heart failure ([address]    Olegario Allredet stone    Non-compliance    Metabolic encephalopathy    Hypoxia    Hypotension    History of venous thromboembolism    Dental abscess    Anemia of chronic renal failure, stage 5 (HCC)    Chronic systolic (congestive) heart failure       Pool Pa was seen today for diabetes, mass, dental pain, cough and hypertension. Diagnoses and all orders for this visit:    Type 1 diabetes mellitus with chronic kidney disease on chronic dialysis (HCC)  -     POCT glycosylated hemoglobin (Hb A1C)  -     Discontinue: insulin lispro, 1 Unit Dial, (HUMALOG/ADMELOG) 100 UNIT/ML SOPN; Inject 10 Units into the skin 3 times daily (before meals) *Plus Sliding Scale*  -     insulin lispro, 1 Unit Dial, (HUMALOG/ADMELOG) 100 UNIT/ML SOPN; Inject 10 Units into the skin 3 times daily (before meals) *Plus Sliding Scale* MAX 50 units a day  -Stable, doing much better with an A1c improvement of 7.7% down from 9.3%  -Continue current insulin dosing as above    Chronic systolic (congestive) heart failure  -Stable, continue carvedilol    Nasal congestion  Productive cough  -     guaiFENesin-codeine (TUSSI-ORGANIDIN NR) 100-10 MG/5ML syrup; Take 10 mLs by mouth 2 times daily as needed for Cough or Congestion for up to 14 days.  -Recommend over-the-counter decongestants as well    Pain, dental  -     acetaminophen (TYLENOL) 500 MG tablet; Take 1 tablet by mouth every 6 hours as needed for Pain    Dental infection  -     amoxicillin (AMOXIL) 500 MG capsule; Take 1 capsule by mouth 3 times daily for 10 days      Educational materials and/or home exercises printed for patient's review and were included in patient instructions on his/her After Visit Summary and given to patient at the end of visit. Counseled regarding above diagnosis, including possible risks and complications, especially if left uncontrolled. Counseled regarding the possible side effects, risks, benefits and alternatives to treatment; patient and/or guardian verbalizes understanding, agrees, feels comfortable with and wishes to proceed with above treatment plan.      Advised patient to call Argelia Bates new medication issues, and read all Rx info from pharmacy to assure aware of all possible risks and side effects of any medication before taking. Reviewed age and gender appropriate health screening exams and vaccinations. Advised patient regarding importance of keeping up with recommended health maintenance and to schedule as soon as possible if overdue, as this is important in assessing for undiagnosed pathology, especially cancer, as well as protecting against potentially harmful/life threatening disease. Patient verbalizes understanding and agrees with above counseling, assessment and plan. All questions answered.     Dayana Rolle,

## 2022-12-04 DIAGNOSIS — E10.22 TYPE 1 DIABETES MELLITUS WITH CHRONIC KIDNEY DISEASE ON CHRONIC DIALYSIS (HCC): ICD-10-CM

## 2022-12-04 DIAGNOSIS — Z99.2 TYPE 1 DIABETES MELLITUS WITH CHRONIC KIDNEY DISEASE ON CHRONIC DIALYSIS (HCC): ICD-10-CM

## 2022-12-04 DIAGNOSIS — N18.6 TYPE 1 DIABETES MELLITUS WITH CHRONIC KIDNEY DISEASE ON CHRONIC DIALYSIS (HCC): ICD-10-CM

## 2022-12-05 RX ORDER — INSULIN LISPRO 100 [IU]/ML
10 INJECTION, SOLUTION INTRAVENOUS; SUBCUTANEOUS
Qty: 5 ADJUSTABLE DOSE PRE-FILLED PEN SYRINGE | Refills: 5 | OUTPATIENT
Start: 2022-12-05

## 2022-12-05 NOTE — TELEPHONE ENCOUNTER
Last Appointment:  11/30/2022  Future Appointments   Date Time Provider Titus Potter   1/16/2023  9:00 AM Kimi Martinez MD [facility]   3/1/2023 11:30 AM Hazel Kim  [address]

## 2023-01-17 ENCOUNTER — PATIENT MESSAGE (OUTPATIENT)
Dept: FAMILY MEDICINE CLINIC | Age: 38
End: 2023-01-17

## 2023-01-17 NOTE — TELEPHONE ENCOUNTER
From: Clayborne Castleman  To: Dr. Leija Nearin2023 3:18 AM EST  Subject: Prescription question     I have a g6 meter and since I change diabetic doctor's and my appointment got rescheduled the company I was getting my supplies for my g6 is no longer in contract with the company there [address] I need to you refill supplies and the things I need are 1.transmitters 2.sensors which I'll be needing here soon the transmitter lasted for 90 days and the sensors last for 1days and them need to be change. I'm not for sure if my pharmacy carries these items I need but you can try it's the [facility] I have on file.  Please let know if you can refill them I think you may have to write a new prescription for these items but I'm not for sure or if y I u need to go through a different company I usually got the items delivered to my home itade it a lot easier to get to the supplies as I needed them

## 2023-01-18 NOTE — TELEPHONE ENCOUNTER
Patient advised of recommendations. She does not know the name of a company that she can get her supplies from. I did advise her that she will have to contact her insurance to see where she can get them from, then she will have to contact them and they will have to fax an order to our office. She did agree to the plan.

## 2023-01-20 DIAGNOSIS — Z99.2 TYPE 1 DIABETES MELLITUS WITH CHRONIC KIDNEY DISEASE ON CHRONIC DIALYSIS (HCC): Primary | ICD-10-CM

## 2023-01-20 DIAGNOSIS — E10.65 UNCONTROLLED TYPE 1 DIABETES MELLITUS WITH HYPERGLYCEMIA (HCC): ICD-10-CM

## 2023-01-20 DIAGNOSIS — K21.9 GASTROESOPHAGEAL REFLUX DISEASE WITHOUT ESOPHAGITIS: ICD-10-CM

## 2023-01-20 DIAGNOSIS — E10.22 TYPE 1 DIABETES MELLITUS WITH CHRONIC KIDNEY DISEASE ON CHRONIC DIALYSIS (HCC): Primary | ICD-10-CM

## 2023-01-20 DIAGNOSIS — K08.89 PAIN, DENTAL: ICD-10-CM

## 2023-01-20 DIAGNOSIS — N18.6 TYPE 1 DIABETES MELLITUS WITH CHRONIC KIDNEY DISEASE ON CHRONIC DIALYSIS (HCC): Primary | ICD-10-CM

## 2023-01-20 RX ORDER — PSEUDOEPHED/ACETAMINOPH/DIPHEN 30MG-500MG
TABLET ORAL
Qty: 120 TABLET | Refills: 1 | Status: SHIPPED | OUTPATIENT
Start: 2023-01-20

## 2023-01-20 RX ORDER — BLOOD-GLUCOSE SENSOR
1 EACH MISCELLANEOUS
Qty: 9 EACH | Refills: 3 | Status: SHIPPED | OUTPATIENT
Start: 2023-01-20 | End: 2023-01-30

## 2023-01-20 RX ORDER — BLOOD-GLUCOSE TRANSMITTER
1 EACH MISCELLANEOUS
Qty: 1 EACH | Refills: 3 | Status: SHIPPED | OUTPATIENT
Start: 2023-01-20 | End: 2023-04-20

## 2023-01-20 RX ORDER — FAMOTIDINE 20 MG/1
20 TABLET, FILM COATED ORAL EVERY MORNING
Qty: 30 TABLET | Refills: 3 | Status: SHIPPED | OUTPATIENT
Start: 2023-01-20

## 2023-01-20 RX ORDER — BLOOD-GLUCOSE SENSOR
EACH MISCELLANEOUS
COMMUNITY
Start: 2022-11-28 | End: 2023-01-20 | Stop reason: SDUPTHER

## 2023-01-20 NOTE — TELEPHONE ENCOUNTER
Phone call from patient. She needs refills on her dexcom sensors and transmitters. She transmitter is changed every 90 days. She would like scripts to go to [facility].

## 2023-01-20 NOTE — TELEPHONE ENCOUNTER
Last Appointment:  11/30/2022  Future Appointments   Date Time Provider Titus Vergaraisti   3/1/2023 11:30 AM Narayan Ceballos DO [address] [place] [address]   5/24/2023  2:00 PM Ana Jauregui MD [facility]

## 2023-02-06 DIAGNOSIS — E16.2 HYPOGLYCEMIA: ICD-10-CM

## 2023-02-06 DIAGNOSIS — E10.65 UNCONTROLLED TYPE 1 DIABETES MELLITUS WITH HYPERGLYCEMIA (HCC): Primary | ICD-10-CM

## 2023-02-07 DIAGNOSIS — E16.2 HYPOGLYCEMIA: ICD-10-CM

## 2023-02-07 NOTE — TELEPHONE ENCOUNTER
Last Appointment:  11/30/2022  Future Appointments   Date Time Provider Titus Apodacai   3/1/2023 11:30 AM Nessa Jones  [facility]   5/24/2023  2:00 PM Marycarmen Toussaint MD [facility]

## 2023-02-09 ENCOUNTER — TELEPHONE (OUTPATIENT)
Dept: FAMILY MEDICINE CLINIC | Age: 38
End: 2023-02-09

## 2023-02-10 ENCOUNTER — HOSPITAL ENCOUNTER (INPATIENT)
Age: 38
LOS: 3 days | Discharge: HOME | DRG: 720 | End: 2023-02-13
Attending: EMERGENCY MEDICINE | Admitting: FAMILY MEDICINE
Payer: MEDICAID

## 2023-02-10 ENCOUNTER — APPOINTMENT (OUTPATIENT)
Dept: GENERAL RADIOLOGY | Age: 38
DRG: 720 | End: 2023-02-10
Payer: MEDICAID

## 2023-02-10 DIAGNOSIS — Z99.2 ESRD (END STAGE RENAL DISEASE) ON DIALYSIS (HCC): ICD-10-CM

## 2023-02-10 DIAGNOSIS — N18.6 ESRD (END STAGE RENAL DISEASE) ON DIALYSIS (HCC): ICD-10-CM

## 2023-02-10 DIAGNOSIS — J96.01 ACUTE RESPIRATORY FAILURE WITH HYPOXIA (HCC): Primary | ICD-10-CM

## 2023-02-10 PROBLEM — J96.00 ACUTE RESPIRATORY FAILURE (HCC): Status: ACTIVE | Noted: 2023-02-10

## 2023-02-10 LAB
ADENOVIRUS BY PCR: NOT DETECTED
ANION GAP SERPL CALCULATED.3IONS-SCNC: 21 MMOL/L (ref 7–16)
BORDETELLA PARAPERTUSSIS BY PCR: NOT DETECTED
BORDETELLA PERTUSSIS BY PCR: NOT DETECTED
BUN BLDV-MCNC: 88 MG/DL (ref 6–20)
CALCIUM SERPL-MCNC: 9.6 MG/DL (ref 8.6–10.2)
CHLAMYDOPHILIA PNEUMONIAE BY PCR: NOT DETECTED
CHLORIDE BLD-SCNC: 99 MMOL/L (ref 98–107)
CO2: 18 MMOL/L (ref 22–29)
CORONAVIRUS 229E BY PCR: NOT DETECTED
CORONAVIRUS HKU1 BY PCR: NOT DETECTED
CORONAVIRUS NL63 BY PCR: NOT DETECTED
CORONAVIRUS OC43 BY PCR: NOT DETECTED
CREAT SERPL-MCNC: 16.1 MG/DL (ref 0.5–1)
GFR SERPL CREATININE-BSD FRML MDRD: 3 ML/MIN/1.73
GLUCOSE BLD-MCNC: 72 MG/DL (ref 74–99)
HCT VFR BLD CALC: 26.4 % (ref 34–48)
HEMOGLOBIN: 9.2 G/DL (ref 11.5–15.5)
HUMAN METAPNEUMOVIRUS BY PCR: NOT DETECTED
HUMAN RHINOVIRUS/ENTEROVIRUS BY PCR: NOT DETECTED
INFLUENZA A BY PCR: NOT DETECTED
INFLUENZA B BY PCR: NOT DETECTED
LACTIC ACID: 1 MMOL/L (ref 0.5–2.2)
MAGNESIUM: 3 MG/DL (ref 1.6–2.6)
MCH RBC QN AUTO: 33.1 PG (ref 26–35)
MCHC RBC AUTO-ENTMCNC: 34.8 % (ref 32–34.5)
MCV RBC AUTO: 95 FL (ref 80–99.9)
MYCOPLASMA PNEUMONIAE BY PCR: NOT DETECTED
PARAINFLUENZA VIRUS 1 BY PCR: NOT DETECTED
PARAINFLUENZA VIRUS 2 BY PCR: NOT DETECTED
PARAINFLUENZA VIRUS 3 BY PCR: NOT DETECTED
PARAINFLUENZA VIRUS 4 BY PCR: NOT DETECTED
PDW BLD-RTO: 16.1 FL (ref 11.5–15)
PHOSPHORUS: 10.6 MG/DL (ref 2.5–4.5)
PLATELET # BLD: 146 E9/L (ref 130–450)
PMV BLD AUTO: 10 FL (ref 7–12)
POTASSIUM SERPL-SCNC: 5.2 MMOL/L (ref 3.5–5)
PROCALCITONIN: 5.49 NG/ML (ref 0–0.08)
RBC # BLD: 2.78 E12/L (ref 3.5–5.5)
RESPIRATORY SYNCYTIAL VIRUS BY PCR: NOT DETECTED
SARS-COV-2, PCR: NOT DETECTED
SODIUM BLD-SCNC: 138 MMOL/L (ref 132–146)
TROPONIN, HIGH SENSITIVITY: 200 NG/L (ref 0–9)
WBC # BLD: 6.6 E9/L (ref 4.5–11.5)

## 2023-02-10 PROCEDURE — 84484 ASSAY OF TROPONIN QUANT: CPT

## 2023-02-10 PROCEDURE — 6360000002 HC RX W HCPCS: Performed by: EMERGENCY MEDICINE

## 2023-02-10 PROCEDURE — 85027 COMPLETE CBC AUTOMATED: CPT

## 2023-02-10 PROCEDURE — 93005 ELECTROCARDIOGRAM TRACING: CPT | Performed by: EMERGENCY MEDICINE

## 2023-02-10 PROCEDURE — 71045 X-RAY EXAM CHEST 1 VIEW: CPT

## 2023-02-10 PROCEDURE — 0202U NFCT DS 22 TRGT SARS-COV-2: CPT

## 2023-02-10 PROCEDURE — 84145 PROCALCITONIN (PCT): CPT

## 2023-02-10 PROCEDURE — 5A1D70Z PERFORMANCE OF URINARY FILTRATION, INTERMITTENT, LESS THAN 6 HOURS PER DAY: ICD-10-PCS | Performed by: INTERNAL MEDICINE

## 2023-02-10 PROCEDURE — 83735 ASSAY OF MAGNESIUM: CPT

## 2023-02-10 PROCEDURE — 84100 ASSAY OF PHOSPHORUS: CPT

## 2023-02-10 PROCEDURE — 99285 EMERGENCY DEPT VISIT HI MDM: CPT

## 2023-02-10 PROCEDURE — 6370000000 HC RX 637 (ALT 250 FOR IP): Performed by: EMERGENCY MEDICINE

## 2023-02-10 PROCEDURE — 2580000003 HC RX 258: Performed by: EMERGENCY MEDICINE

## 2023-02-10 PROCEDURE — 83605 ASSAY OF LACTIC ACID: CPT

## 2023-02-10 PROCEDURE — 94640 AIRWAY INHALATION TREATMENT: CPT

## 2023-02-10 PROCEDURE — 80048 BASIC METABOLIC PNL TOTAL CA: CPT

## 2023-02-10 PROCEDURE — 90935 HEMODIALYSIS ONE EVALUATION: CPT

## 2023-02-10 PROCEDURE — 2060000000 HC ICU INTERMEDIATE R&B

## 2023-02-10 PROCEDURE — 87040 BLOOD CULTURE FOR BACTERIA: CPT

## 2023-02-10 PROCEDURE — 36415 COLL VENOUS BLD VENIPUNCTURE: CPT

## 2023-02-10 RX ORDER — IPRATROPIUM BROMIDE AND ALBUTEROL SULFATE 2.5; .5 MG/3ML; MG/3ML
1 SOLUTION RESPIRATORY (INHALATION) ONCE
Status: COMPLETED | OUTPATIENT
Start: 2023-02-10 | End: 2023-02-10

## 2023-02-10 RX ORDER — DOXYCYCLINE HYCLATE 100 MG/1
100 CAPSULE ORAL ONCE
Status: COMPLETED | OUTPATIENT
Start: 2023-02-10 | End: 2023-02-10

## 2023-02-10 RX ORDER — METOPROLOL SUCCINATE 25 MG/1
25 TABLET, EXTENDED RELEASE ORAL ONCE
Status: DISCONTINUED | OUTPATIENT
Start: 2023-02-10 | End: 2023-02-10

## 2023-02-10 RX ADMIN — CEFTRIAXONE 2000 MG: 2 INJECTION, POWDER, FOR SOLUTION INTRAMUSCULAR; INTRAVENOUS at 22:12

## 2023-02-10 RX ADMIN — IPRATROPIUM BROMIDE AND ALBUTEROL SULFATE 1 AMPULE: 2.5; .5 SOLUTION RESPIRATORY (INHALATION) at 16:58

## 2023-02-10 RX ADMIN — DOXYCYCLINE HYCLATE 100 MG: 100 CAPSULE ORAL at 22:10

## 2023-02-10 ASSESSMENT — PAIN - FUNCTIONAL ASSESSMENT
PAIN_FUNCTIONAL_ASSESSMENT: NONE - DENIES PAIN
PAIN_FUNCTIONAL_ASSESSMENT: NONE - DENIES PAIN

## 2023-02-10 ASSESSMENT — LIFESTYLE VARIABLES: HOW OFTEN DO YOU HAVE A DRINK CONTAINING ALCOHOL: NEVER

## 2023-02-10 NOTE — CONSULTS
Nephrology Consult Note  Patient's Name: Sulma Knowles  6:39 PM  2/10/2023        Reason for Consult: ESRD HD dependent  Requesting Physician:  Jeyson Diamond DO    Chief Complaint: Shortness of breath; missed HD treatment for 1 week  History Obtained From:  patient and EHR    History of Present Ilness:    Sulma Knowles is a 44 y.o. female with a history of ESRD HD dependent, hypertension, type I DM. She presented to ED with complaints of increasing generalized edema including facial edema. This was associated with shortness of breath. She denies any chest pain. No cough. No fever or chills. Patient reports that she was unable to secure care for her special needs child. As a result she could not make her dialysis appointments. On arrival to ED vitals showed blood pressure 164/112, pulse 113, respirations 22. Temperature 97.5. Laboratory data showed high-sensitivity troponin 200, magnesium 3, phosphorus 10.6, WBC 6.6, hemoglobin 9.2, platelet count 804 K, potassium 5.2, CO2 18, BUN 88 and creatinine 16. Procalcitonin was 5.  4 9 chest x-ray showed right lung air space opacities viral respiratory panel was negative. CTA of the chest showed findings of pulmonary edema with question of multilobar pneumonia. There was no evidence of pulmonary embolism. Patient received vancomycin and ceftriaxone. She was subsequently taken to the hemodialysis suite for treatment. To be followed by admission to telemetry.     Past Medical History:   Diagnosis Date    JOLLY (acute kidney injury) ([facility] 4/5/2016    AVF (arteriovenous fistula) ([facility] 02/19/2018    let arm    Chronic kidney disease     Dialysis AV fistula malfunction, initial encounter ([facility] 11/7/2019    DM type 1 (diabetes mellitus, type 1) ([facility]     Encounter regarding vascular access for dialysis for ESRD ([facility] 07/12/2018    ESRD (end stage renal disease) ([facility]     Hemodialysis patient ([facility]     amrita dawson mon wed fri / graft in left arm    Hypertension     Tobacco abuse 2016       Past Surgical History:   Procedure Laterality Date     SECTION      CYST INCISION AND DRAINAGE  2011    perirectal abscess. [facility]. Dr. Pravin Hyatt Left 2019    LEFT ARM FISTULAGRAM, BALLOON ANGIOPLASTY performed by Luiz Coppola MD at [address] [facility]      FRACTURE SURGERY      fracture right ulnar, left tibia, and pelvis    OTHER SURGICAL HISTORY  /12    open reduction internal fixation left radial shaft    OTHER SURGICAL HISTORY  2016    pericardial window    OTHER SURGICAL HISTORY  2016     r tesio insertion  / remove 2018    OTHER SURGICAL HISTORY Left 2017    insertion a-v fistula left arm    [place] ARTERIOVENOUS ANASTOMOSIS OPEN DIRECT Left 2018    REVISION AV FISTULA - LEFT ARM performed by Luiz Coppola MD at [address] NON-TUNNELED CENTRAL VENOUS CATH AGE 5 YR/> N/A 5/10/2018    TESIO CATHETER INSERTION performed by Jim Brady MD at [facility] PRERETINAL MEMBRANE Left 2018    PARS PLANA VITRECTOMY 25 GAUGE, VITREOUS HEMORRHAGE REMOVAL, ENDO LASER, AIR/FLUID  EXCHANGE LEFT EYE performed by Ilene Caputo MD at [facility] N/A 2021    PERINEAL ABCESS INCISION AND DRAINAGE (LITHOTOMY) performed by Karina Solomon MD at [facility] History   Problem Relation Age of Onset    Stroke Mother     Cancer Father     Diabetes Paternal Aunt         reports that she has been smoking cigarettes. She has never used smokeless tobacco. She reports that she does not currently use alcohol. She reports that she does not use drugs. Allergies:  Patient has no known allergies. Current Medications:    metoprolol succinate (TOPROL XL) extended release tablet 25 mg, Once        Review of Systems:   Pertinent items are noted in HPI.     Physical exam:  Vitals:    02/10/23 1830   BP: (!) 194/102   Pulse: (!) 104   Resp:    Temp:    SpO2:            General: No distress. Alert. Eyes: PERRL. No sclera icterus. No conjunctival injectio; periorbital edema and facial puffiness  ENT: No discharge. Pharynx clear. Neck: Trachea midline. Normal thyroid. Lungs: No accessory muscle use. No crackles. No wheezing. No rhonchi. CV: Regular rate. Regular rhythm. No murmur or rub. .   Abd: Non-tender. Non-distended. No masses. No organmegaly. Normal bowel sounds. Skin: Warm and dry. No nodule on exposed extremities. No rash on exposed extremities. Ext: No cyanosis, clubbing, 2+ pitting bilateral legs edema; LFA AVF cannulated  Neuro: Awake. Follows commands. Positive pupils/gag/corneals. Normal pain response.       Data:   Labs:  Lab Results   Component Value Date     07/29/2022     07/28/2022     07/28/2022    K 4.5 07/29/2022    K 4.7 07/28/2022    K 6.7 (HH) 07/28/2022    CL 96 (L) 07/29/2022    CO2 21 (L) 07/29/2022    CO2 19 (L) 07/28/2022    CO2 19 (L) 07/28/2022    CREATININE 10.1 (HH) 07/29/2022    CREATININE 9.8 (HH) 07/28/2022    CREATININE 17.2 (HH) 07/28/2022    BUN 48 (H) 07/29/2022    BUN 44 (H) 07/28/2022     (H) 07/28/2022    GLUCOSE 344 (H) 07/29/2022    GLUCOSE 377 (H) 07/28/2022    GLUCOSE 299 (H) 07/28/2022    PHOS 6.6 (H) 02/01/2021    PHOS 5.7 (H) 01/29/2021    PHOS 6.0 (H) 11/07/2019    WBC 6.6 02/10/2023    WBC 5.1 07/28/2022    WBC 6.3 03/18/2022    HGB 9.2 (L) 02/10/2023    HGB 10.8 (L) 07/28/2022    HGB 8.1 (L) 03/18/2022    HCT 26.4 (L) 02/10/2023    HCT 31.3 (L) 07/28/2022    HCT 24.0 (L) 03/18/2022    MCV 95.0 02/10/2023     02/10/2023         Imaging:  XR CHEST PORTABLE    Result Date: 2/10/2023  EXAMINATION: ONE XRAY VIEW OF THE CHEST 2/10/2023 4:11 pm COMPARISON: 07/28/2022 HISTORY: ORDERING SYSTEM PROVIDED HISTORY: chest pain TECHNOLOGIST PROVIDED HISTORY: Reason for exam:->chest pain What reading provider will be dictating this exam?->CRC FINDINGS: Right lung opacities. There is no effusion or pneumothorax. Stable heart size. The osseous structures are without acute process. Right lung airspace opacities. Correlate with pneumonia clinically. Assessment/Plans    1. Acute pulmonary edema due to missed hemodialysis treatments  Hemodialysis  Fluid restriction    2. Hypertensive urgency  Suspect due to missed doses of BP meds  Adjust BP meds    3. Acute respiratory failure due to acute pulmonary edema  Supplemental oxygen  Suspect improvement with dialysis    4. Severe hyperphosphatemia  Question if she is taking any phosphate binders  will begin phosphate binder  Monitor levels    5. ESRD HD dependent  Missed 3 last HD treatments due to problems with childcare  Hemodialysis  Fluid restriction  SW at patient's dialysis facility to follow-up on this issue        Will follow  Thanks    Aishwarya Schmitz MD  6:39 PM  2/10/2023      Department of Internal Medicine  Section of Nephrology  Dialysis Note        PROCEDURE:  Patient seen on hemodialysis at 6:40 PM    PHYSICIAN:  Shayla Gamboa M.D., [address]    INDICATION:ESRD      RX:  See dialysis flowsheet for specifics on access, blood flow rate, dialysate baths, duration of dialysis, anticoagulation and other technical information.     COMMENTS:  Procedure in progress and tolerated       Aishwarya Schmitz MD, MD

## 2023-02-11 ENCOUNTER — APPOINTMENT (OUTPATIENT)
Dept: CT IMAGING | Age: 38
DRG: 720 | End: 2023-02-11
Payer: MEDICAID

## 2023-02-11 LAB
ALBUMIN SERPL-MCNC: 3.3 G/DL (ref 3.5–5.2)
ALBUMIN SERPL-MCNC: 3.6 G/DL (ref 3.5–5.2)
ALBUMIN SERPL-MCNC: 3.7 G/DL (ref 3.5–5.2)
ALP BLD-CCNC: 124 U/L (ref 35–104)
ALP BLD-CCNC: 135 U/L (ref 35–104)
ALP BLD-CCNC: 139 U/L (ref 35–104)
ALT SERPL-CCNC: 24 U/L (ref 0–32)
ALT SERPL-CCNC: 26 U/L (ref 0–32)
ALT SERPL-CCNC: 28 U/L (ref 0–32)
ANION GAP SERPL CALCULATED.3IONS-SCNC: 15 MMOL/L (ref 7–16)
ANION GAP SERPL CALCULATED.3IONS-SCNC: 17 MMOL/L (ref 7–16)
ANION GAP SERPL CALCULATED.3IONS-SCNC: 19 MMOL/L (ref 7–16)
APTT: 32.7 SEC (ref 24.5–35.1)
AST SERPL-CCNC: 21 U/L (ref 0–31)
AST SERPL-CCNC: 25 U/L (ref 0–31)
AST SERPL-CCNC: 27 U/L (ref 0–31)
BASOPHILS ABSOLUTE: 0.02 E9/L (ref 0–0.2)
BASOPHILS ABSOLUTE: 0.02 E9/L (ref 0–0.2)
BASOPHILS ABSOLUTE: 0.03 E9/L (ref 0–0.2)
BASOPHILS RELATIVE PERCENT: 0.3 % (ref 0–2)
BASOPHILS RELATIVE PERCENT: 0.3 % (ref 0–2)
BASOPHILS RELATIVE PERCENT: 0.4 % (ref 0–2)
BILIRUB SERPL-MCNC: 0.6 MG/DL (ref 0–1.2)
BUN BLDV-MCNC: 17 MG/DL (ref 6–20)
BUN BLDV-MCNC: 47 MG/DL (ref 6–20)
BUN BLDV-MCNC: 49 MG/DL (ref 6–20)
C-REACTIVE PROTEIN: 6.7 MG/DL (ref 0–0.4)
CALCIUM SERPL-MCNC: 8.8 MG/DL (ref 8.6–10.2)
CALCIUM SERPL-MCNC: 9.3 MG/DL (ref 8.6–10.2)
CALCIUM SERPL-MCNC: 9.3 MG/DL (ref 8.6–10.2)
CHLORIDE BLD-SCNC: 94 MMOL/L (ref 98–107)
CHLORIDE BLD-SCNC: 95 MMOL/L (ref 98–107)
CHLORIDE BLD-SCNC: 96 MMOL/L (ref 98–107)
CO2: 23 MMOL/L (ref 22–29)
CO2: 23 MMOL/L (ref 22–29)
CO2: 27 MMOL/L (ref 22–29)
CREAT SERPL-MCNC: 4.3 MG/DL (ref 0.5–1)
CREAT SERPL-MCNC: 9.6 MG/DL (ref 0.5–1)
CREAT SERPL-MCNC: 9.7 MG/DL (ref 0.5–1)
EOSINOPHILS ABSOLUTE: 0.13 E9/L (ref 0.05–0.5)
EOSINOPHILS ABSOLUTE: 0.14 E9/L (ref 0.05–0.5)
EOSINOPHILS ABSOLUTE: 0.16 E9/L (ref 0.05–0.5)
EOSINOPHILS RELATIVE PERCENT: 1.8 % (ref 0–6)
EOSINOPHILS RELATIVE PERCENT: 1.9 % (ref 0–6)
EOSINOPHILS RELATIVE PERCENT: 2.3 % (ref 0–6)
GFR SERPL CREATININE-BSD FRML MDRD: 13 ML/MIN/1.73
GFR SERPL CREATININE-BSD FRML MDRD: 5 ML/MIN/1.73
GFR SERPL CREATININE-BSD FRML MDRD: 5 ML/MIN/1.73
GLUCOSE BLD-MCNC: 176 MG/DL (ref 74–99)
GLUCOSE BLD-MCNC: 194 MG/DL (ref 74–99)
GLUCOSE BLD-MCNC: 225 MG/DL (ref 74–99)
HCT VFR BLD CALC: 25.2 % (ref 34–48)
HCT VFR BLD CALC: 25.5 % (ref 34–48)
HCT VFR BLD CALC: 26.2 % (ref 34–48)
HEMOGLOBIN: 8.6 G/DL (ref 11.5–15.5)
HEMOGLOBIN: 8.9 G/DL (ref 11.5–15.5)
HEMOGLOBIN: 9 G/DL (ref 11.5–15.5)
IMMATURE GRANULOCYTES #: 0.03 E9/L
IMMATURE GRANULOCYTES #: 0.04 E9/L
IMMATURE GRANULOCYTES #: 0.04 E9/L
IMMATURE GRANULOCYTES %: 0.4 % (ref 0–5)
IMMATURE GRANULOCYTES %: 0.5 % (ref 0–5)
IMMATURE GRANULOCYTES %: 0.6 % (ref 0–5)
INR BLD: 1.2
LACTIC ACID: 3.7 MMOL/L (ref 0.5–2.2)
LYMPHOCYTES ABSOLUTE: 0.92 E9/L (ref 1.5–4)
LYMPHOCYTES ABSOLUTE: 0.94 E9/L (ref 1.5–4)
LYMPHOCYTES ABSOLUTE: 0.94 E9/L (ref 1.5–4)
LYMPHOCYTES RELATIVE PERCENT: 12.8 % (ref 20–42)
LYMPHOCYTES RELATIVE PERCENT: 13 % (ref 20–42)
LYMPHOCYTES RELATIVE PERCENT: 13.4 % (ref 20–42)
MCH RBC QN AUTO: 32 PG (ref 26–35)
MCH RBC QN AUTO: 32.6 PG (ref 26–35)
MCH RBC QN AUTO: 33.6 PG (ref 26–35)
MCHC RBC AUTO-ENTMCNC: 33.7 % (ref 32–34.5)
MCHC RBC AUTO-ENTMCNC: 34.4 % (ref 32–34.5)
MCHC RBC AUTO-ENTMCNC: 35.3 % (ref 32–34.5)
MCV RBC AUTO: 93.2 FL (ref 80–99.9)
MCV RBC AUTO: 95.1 FL (ref 80–99.9)
MCV RBC AUTO: 96.6 FL (ref 80–99.9)
METER GLUCOSE: 188 MG/DL (ref 74–99)
METER GLUCOSE: 209 MG/DL (ref 74–99)
METER GLUCOSE: 275 MG/DL (ref 74–99)
METER GLUCOSE: 275 MG/DL (ref 74–99)
METER GLUCOSE: 418 MG/DL (ref 74–99)
MONOCYTES ABSOLUTE: 0.42 E9/L (ref 0.1–0.95)
MONOCYTES ABSOLUTE: 0.51 E9/L (ref 0.1–0.95)
MONOCYTES ABSOLUTE: 0.53 E9/L (ref 0.1–0.95)
MONOCYTES RELATIVE PERCENT: 5.7 % (ref 2–12)
MONOCYTES RELATIVE PERCENT: 7.1 % (ref 2–12)
MONOCYTES RELATIVE PERCENT: 7.7 % (ref 2–12)
NEUTROPHILS ABSOLUTE: 5.17 E9/L (ref 1.8–7.3)
NEUTROPHILS ABSOLUTE: 5.57 E9/L (ref 1.8–7.3)
NEUTROPHILS ABSOLUTE: 5.8 E9/L (ref 1.8–7.3)
NEUTROPHILS RELATIVE PERCENT: 75.6 % (ref 43–80)
NEUTROPHILS RELATIVE PERCENT: 77.3 % (ref 43–80)
NEUTROPHILS RELATIVE PERCENT: 78.9 % (ref 43–80)
PDW BLD-RTO: 16.2 FL (ref 11.5–15)
PDW BLD-RTO: 16.4 FL (ref 11.5–15)
PDW BLD-RTO: 16.7 FL (ref 11.5–15)
PLATELET # BLD: 136 E9/L (ref 130–450)
PLATELET # BLD: 136 E9/L (ref 130–450)
PLATELET # BLD: 155 E9/L (ref 130–450)
PMV BLD AUTO: 10.3 FL (ref 7–12)
PMV BLD AUTO: 9.2 FL (ref 7–12)
PMV BLD AUTO: 9.3 FL (ref 7–12)
POTASSIUM REFLEX MAGNESIUM: 4.4 MMOL/L (ref 3.5–5)
POTASSIUM SERPL-SCNC: 3.6 MMOL/L (ref 3.5–5)
POTASSIUM SERPL-SCNC: 4.7 MMOL/L (ref 3.5–5)
PROCALCITONIN: 8.29 NG/ML (ref 0–0.08)
PROTHROMBIN TIME: 13.6 SEC (ref 9.3–12.4)
RBC # BLD: 2.64 E12/L (ref 3.5–5.5)
RBC # BLD: 2.65 E12/L (ref 3.5–5.5)
RBC # BLD: 2.81 E12/L (ref 3.5–5.5)
SEDIMENTATION RATE, ERYTHROCYTE: 38 MM/HR (ref 0–20)
SODIUM BLD-SCNC: 134 MMOL/L (ref 132–146)
SODIUM BLD-SCNC: 137 MMOL/L (ref 132–146)
SODIUM BLD-SCNC: 138 MMOL/L (ref 132–146)
TOTAL PROTEIN: 6.7 G/DL (ref 6.4–8.3)
TOTAL PROTEIN: 7.5 G/DL (ref 6.4–8.3)
TOTAL PROTEIN: 7.6 G/DL (ref 6.4–8.3)
WBC # BLD: 6.9 E9/L (ref 4.5–11.5)
WBC # BLD: 7.2 E9/L (ref 4.5–11.5)
WBC # BLD: 7.4 E9/L (ref 4.5–11.5)

## 2023-02-11 PROCEDURE — 82962 GLUCOSE BLOOD TEST: CPT

## 2023-02-11 PROCEDURE — 85730 THROMBOPLASTIN TIME PARTIAL: CPT

## 2023-02-11 PROCEDURE — S5553 INSULIN LONG ACTING 5 U: HCPCS | Performed by: FAMILY MEDICINE

## 2023-02-11 PROCEDURE — 2580000003 HC RX 258: Performed by: FAMILY MEDICINE

## 2023-02-11 PROCEDURE — 80053 COMPREHEN METABOLIC PANEL: CPT

## 2023-02-11 PROCEDURE — 2000000000 HC ICU R&B

## 2023-02-11 PROCEDURE — 71275 CT ANGIOGRAPHY CHEST: CPT

## 2023-02-11 PROCEDURE — 2580000003 HC RX 258: Performed by: EMERGENCY MEDICINE

## 2023-02-11 PROCEDURE — 83605 ASSAY OF LACTIC ACID: CPT

## 2023-02-11 PROCEDURE — 6370000000 HC RX 637 (ALT 250 FOR IP)

## 2023-02-11 PROCEDURE — 85025 COMPLETE CBC W/AUTO DIFF WBC: CPT

## 2023-02-11 PROCEDURE — 36415 COLL VENOUS BLD VENIPUNCTURE: CPT

## 2023-02-11 PROCEDURE — 6370000000 HC RX 637 (ALT 250 FOR IP): Performed by: FAMILY MEDICINE

## 2023-02-11 PROCEDURE — 86140 C-REACTIVE PROTEIN: CPT

## 2023-02-11 PROCEDURE — 6360000002 HC RX W HCPCS: Performed by: FAMILY MEDICINE

## 2023-02-11 PROCEDURE — 6360000002 HC RX W HCPCS

## 2023-02-11 PROCEDURE — 6360000002 HC RX W HCPCS: Performed by: EMERGENCY MEDICINE

## 2023-02-11 PROCEDURE — 85610 PROTHROMBIN TIME: CPT

## 2023-02-11 PROCEDURE — 87081 CULTURE SCREEN ONLY: CPT

## 2023-02-11 PROCEDURE — 84145 PROCALCITONIN (PCT): CPT

## 2023-02-11 PROCEDURE — 2580000003 HC RX 258

## 2023-02-11 PROCEDURE — 85651 RBC SED RATE NONAUTOMATED: CPT

## 2023-02-11 PROCEDURE — 90935 HEMODIALYSIS ONE EVALUATION: CPT

## 2023-02-11 PROCEDURE — 6360000004 HC RX CONTRAST MEDICATION: Performed by: RADIOLOGY

## 2023-02-11 PROCEDURE — 6360000002 HC RX W HCPCS: Performed by: INTERNAL MEDICINE

## 2023-02-11 PROCEDURE — 2580000003 HC RX 258: Performed by: INTERNAL MEDICINE

## 2023-02-11 RX ORDER — 0.9 % SODIUM CHLORIDE 0.9 %
1000 INTRAVENOUS SOLUTION INTRAVENOUS ONCE
Status: COMPLETED | OUTPATIENT
Start: 2023-02-11 | End: 2023-02-11

## 2023-02-11 RX ORDER — ACETAMINOPHEN 650 MG/1
650 SUPPOSITORY RECTAL EVERY 6 HOURS PRN
Status: DISCONTINUED | OUTPATIENT
Start: 2023-02-11 | End: 2023-02-13 | Stop reason: HOSPADM

## 2023-02-11 RX ORDER — SODIUM CHLORIDE 0.9 % (FLUSH) 0.9 %
10 SYRINGE (ML) INJECTION EVERY 12 HOURS SCHEDULED
Status: DISCONTINUED | OUTPATIENT
Start: 2023-02-11 | End: 2023-02-13 | Stop reason: HOSPADM

## 2023-02-11 RX ORDER — HYDRALAZINE HYDROCHLORIDE 25 MG/1
25 TABLET, FILM COATED ORAL EVERY 8 HOURS SCHEDULED
Status: DISCONTINUED | OUTPATIENT
Start: 2023-02-11 | End: 2023-02-13 | Stop reason: HOSPADM

## 2023-02-11 RX ORDER — NICOTINE POLACRILEX 4 MG
.5-1 LOZENGE BUCCAL PRN
Status: DISCONTINUED | OUTPATIENT
Start: 2023-02-11 | End: 2023-02-11 | Stop reason: CLARIF

## 2023-02-11 RX ORDER — BUPROPION HYDROCHLORIDE 100 MG/1
100 TABLET, EXTENDED RELEASE ORAL
Status: DISCONTINUED | OUTPATIENT
Start: 2023-02-11 | End: 2023-02-12

## 2023-02-11 RX ORDER — INSULIN LISPRO 100 [IU]/ML
10 INJECTION, SOLUTION INTRAVENOUS; SUBCUTANEOUS
Status: DISCONTINUED | OUTPATIENT
Start: 2023-02-11 | End: 2023-02-13 | Stop reason: HOSPADM

## 2023-02-11 RX ORDER — CARVEDILOL 25 MG/1
50 TABLET ORAL EVERY MORNING
Status: DISCONTINUED | OUTPATIENT
Start: 2023-02-11 | End: 2023-02-13 | Stop reason: HOSPADM

## 2023-02-11 RX ORDER — DIPHENHYDRAMINE HYDROCHLORIDE 50 MG/ML
INJECTION INTRAMUSCULAR; INTRAVENOUS
Status: COMPLETED
Start: 2023-02-11 | End: 2023-02-11

## 2023-02-11 RX ORDER — INSULIN GLARGINE-YFGN 100 [IU]/ML
12 INJECTION, SOLUTION SUBCUTANEOUS NIGHTLY
Status: DISCONTINUED | OUTPATIENT
Start: 2023-02-11 | End: 2023-02-13

## 2023-02-11 RX ORDER — ACETAMINOPHEN 325 MG/1
650 TABLET ORAL EVERY 6 HOURS PRN
Status: DISCONTINUED | OUTPATIENT
Start: 2023-02-11 | End: 2023-02-13 | Stop reason: HOSPADM

## 2023-02-11 RX ORDER — DEXTROSE MONOHYDRATE 100 MG/ML
INJECTION, SOLUTION INTRAVENOUS CONTINUOUS PRN
Status: DISCONTINUED | OUTPATIENT
Start: 2023-02-11 | End: 2023-02-13 | Stop reason: HOSPADM

## 2023-02-11 RX ORDER — PROMETHAZINE HYDROCHLORIDE 12.5 MG/1
12.5 TABLET ORAL EVERY 6 HOURS PRN
Status: DISCONTINUED | OUTPATIENT
Start: 2023-02-11 | End: 2023-02-13 | Stop reason: HOSPADM

## 2023-02-11 RX ORDER — FAMOTIDINE 20 MG/1
20 TABLET, FILM COATED ORAL EVERY MORNING
Status: DISCONTINUED | OUTPATIENT
Start: 2023-02-11 | End: 2023-02-13 | Stop reason: HOSPADM

## 2023-02-11 RX ORDER — ONDANSETRON 2 MG/ML
4 INJECTION INTRAMUSCULAR; INTRAVENOUS EVERY 6 HOURS PRN
Status: DISCONTINUED | OUTPATIENT
Start: 2023-02-11 | End: 2023-02-13 | Stop reason: HOSPADM

## 2023-02-11 RX ORDER — HYDRALAZINE HYDROCHLORIDE 20 MG/ML
10 INJECTION INTRAMUSCULAR; INTRAVENOUS EVERY 4 HOURS PRN
Status: DISCONTINUED | OUTPATIENT
Start: 2023-02-11 | End: 2023-02-13 | Stop reason: HOSPADM

## 2023-02-11 RX ORDER — HEPARIN SODIUM 10000 [USP'U]/ML
5000 INJECTION, SOLUTION INTRAVENOUS; SUBCUTANEOUS EVERY 8 HOURS SCHEDULED
Status: DISCONTINUED | OUTPATIENT
Start: 2023-02-11 | End: 2023-02-13 | Stop reason: HOSPADM

## 2023-02-11 RX ORDER — DIPHENHYDRAMINE HYDROCHLORIDE 50 MG/ML
25 INJECTION INTRAMUSCULAR; INTRAVENOUS
Status: COMPLETED | OUTPATIENT
Start: 2023-02-11 | End: 2023-02-11

## 2023-02-11 RX ORDER — SODIUM CHLORIDE 0.9 % (FLUSH) 0.9 %
10 SYRINGE (ML) INJECTION PRN
Status: DISCONTINUED | OUTPATIENT
Start: 2023-02-11 | End: 2023-02-13 | Stop reason: HOSPADM

## 2023-02-11 RX ORDER — ALBUTEROL SULFATE 2.5 MG/3ML
2.5 SOLUTION RESPIRATORY (INHALATION) EVERY 6 HOURS PRN
Status: DISCONTINUED | OUTPATIENT
Start: 2023-02-11 | End: 2023-02-13 | Stop reason: HOSPADM

## 2023-02-11 RX ORDER — LABETALOL HYDROCHLORIDE 5 MG/ML
10 INJECTION, SOLUTION INTRAVENOUS EVERY 4 HOURS PRN
Status: DISCONTINUED | OUTPATIENT
Start: 2023-02-11 | End: 2023-02-13 | Stop reason: HOSPADM

## 2023-02-11 RX ORDER — DOXYCYCLINE HYCLATE 100 MG/1
100 CAPSULE ORAL EVERY 12 HOURS SCHEDULED
Status: DISCONTINUED | OUTPATIENT
Start: 2023-02-11 | End: 2023-02-11

## 2023-02-11 RX ORDER — SODIUM CHLORIDE 9 MG/ML
INJECTION, SOLUTION INTRAVENOUS PRN
Status: DISCONTINUED | OUTPATIENT
Start: 2023-02-11 | End: 2023-02-13 | Stop reason: HOSPADM

## 2023-02-11 RX ORDER — POLYETHYLENE GLYCOL 3350 17 G/17G
17 POWDER, FOR SOLUTION ORAL DAILY PRN
Status: DISCONTINUED | OUTPATIENT
Start: 2023-02-11 | End: 2023-02-13 | Stop reason: HOSPADM

## 2023-02-11 RX ORDER — CHOLECALCIFEROL (VITAMIN D3) 50 MCG
2000 TABLET ORAL DAILY
Status: DISCONTINUED | OUTPATIENT
Start: 2023-02-11 | End: 2023-02-13 | Stop reason: HOSPADM

## 2023-02-11 RX ADMIN — HEPARIN SODIUM 5000 UNITS: 10000 INJECTION INTRAVENOUS; SUBCUTANEOUS at 20:08

## 2023-02-11 RX ADMIN — HYDRALAZINE HYDROCHLORIDE 25 MG: 25 TABLET, FILM COATED ORAL at 06:21

## 2023-02-11 RX ADMIN — INSULIN LISPRO 10 UNITS: 100 INJECTION, SOLUTION INTRAVENOUS; SUBCUTANEOUS at 17:22

## 2023-02-11 RX ADMIN — CEFEPIME 1000 MG: 1 INJECTION, POWDER, FOR SOLUTION INTRAMUSCULAR; INTRAVENOUS at 13:26

## 2023-02-11 RX ADMIN — HEPARIN SODIUM 5000 UNITS: 10000 INJECTION INTRAVENOUS; SUBCUTANEOUS at 06:17

## 2023-02-11 RX ADMIN — DIPHENHYDRAMINE HYDROCHLORIDE 25 MG: 50 INJECTION, SOLUTION INTRAMUSCULAR; INTRAVENOUS at 15:08

## 2023-02-11 RX ADMIN — SODIUM CHLORIDE 1000 ML: 9 INJECTION, SOLUTION INTRAVENOUS at 14:40

## 2023-02-11 RX ADMIN — IOPAMIDOL 75 ML: 755 INJECTION, SOLUTION INTRAVENOUS at 01:30

## 2023-02-11 RX ADMIN — AZITHROMYCIN 500 MG: 500 INJECTION, POWDER, LYOPHILIZED, FOR SOLUTION INTRAVENOUS at 13:29

## 2023-02-11 RX ADMIN — INSULIN LISPRO 10 UNITS: 100 INJECTION, SOLUTION INTRAVENOUS; SUBCUTANEOUS at 11:17

## 2023-02-11 RX ADMIN — VANCOMYCIN HYDROCHLORIDE 1250 MG: 10 INJECTION, POWDER, LYOPHILIZED, FOR SOLUTION INTRAVENOUS at 00:00

## 2023-02-11 RX ADMIN — VANCOMYCIN HYDROCHLORIDE 1000 MG: 1 INJECTION, POWDER, LYOPHILIZED, FOR SOLUTION INTRAVENOUS at 12:04

## 2023-02-11 RX ADMIN — POTASSIUM BICARBONATE 40 MEQ: 782 TABLET, EFFERVESCENT ORAL at 20:16

## 2023-02-11 RX ADMIN — DOXYCYCLINE HYCLATE 100 MG: 100 CAPSULE ORAL at 11:56

## 2023-02-11 RX ADMIN — CARVEDILOL 50 MG: 25 TABLET, FILM COATED ORAL at 11:55

## 2023-02-11 RX ADMIN — HYDROCORTISONE SODIUM SUCCINATE 300 MG: 100 INJECTION, POWDER, FOR SOLUTION INTRAMUSCULAR; INTRAVENOUS at 14:14

## 2023-02-11 RX ADMIN — HEPARIN SODIUM 5000 UNITS: 10000 INJECTION INTRAVENOUS; SUBCUTANEOUS at 13:33

## 2023-02-11 RX ADMIN — FAMOTIDINE 20 MG: 20 TABLET, FILM COATED ORAL at 11:54

## 2023-02-11 RX ADMIN — SODIUM CHLORIDE, PRESERVATIVE FREE 10 ML: 5 INJECTION INTRAVENOUS at 20:08

## 2023-02-11 RX ADMIN — Medication 2000 UNITS: at 12:09

## 2023-02-11 RX ADMIN — DIPHENHYDRAMINE HYDROCHLORIDE 25 MG: 50 INJECTION INTRAMUSCULAR; INTRAVENOUS at 15:08

## 2023-02-11 RX ADMIN — INSULIN GLARGINE-YFGN 12 UNITS: 100 INJECTION, SOLUTION SUBCUTANEOUS at 20:07

## 2023-02-11 ASSESSMENT — ENCOUNTER SYMPTOMS
SHORTNESS OF BREATH: 1
COLOR CHANGE: 0
BACK PAIN: 0
ABDOMINAL PAIN: 0
ABDOMINAL DISTENTION: 0
WHEEZING: 0
CONSTIPATION: 0
VOMITING: 0
DIARRHEA: 0
COUGH: 0
NAUSEA: 0
CHEST TIGHTNESS: 0

## 2023-02-11 ASSESSMENT — LIFESTYLE VARIABLES: HOW OFTEN DO YOU HAVE A DRINK CONTAINING ALCOHOL: NEVER

## 2023-02-11 NOTE — CONSULTS
[address] [place] [address] [facility]    Consultation Note     Admit Date: 2/10/2023  3:45 PM    Reason for Consult: Pneumonia    Attending Physician:  Yohan Shaw MD     Chief Complaint: Shortness of breath    HISTORY OF PRESENT ILLNESS:   57-year-old female with past medical history of ESRD on HD via AV fistula, DM, HTN, chronic tobacco abuser presented with shortness of breath. She missed to recent dialysis sessions. She was found to be tachypneic and tachycardic. She was started on 5 L nasal cannula. CTA chest showed bilateral patchy opacities consistent with pulmonary edema versus bilateral multifocal pneumonia. Elevated procalcitonin noted. Patient is currently being treated with cefepime, azithromycin and vancomycin. Patient was found to be hypotensive after hemodialysis session today. RRT was called and patient was transferred to MICU. ID is following for sepsis due to pneumonia. Past Medical History:        Diagnosis Date    JOLLY (acute kidney injury) ([facility] 2016    AVF (arteriovenous fistula) ([facility] 2018    let arm    Chronic kidney disease     Dialysis AV fistula malfunction, initial encounter ([address] 2019    DM type 1 (diabetes mellitus, type 1) ([address]     Encounter regarding vascular access for dialysis for ESRD ([facility] 2018    ESRD (end stage renal disease) ([facility]     Hemodialysis patient ([facility]     gabbysinus eunice  / graft in left arm    Hypertension     Tobacco abuse 2016     Past Surgical History:        Procedure Laterality Date     SECTION  2013    CYST INCISION AND DRAINAGE  2011    perirectal abscess. [facility].  Dr. Earl Brown Left 2019    LEFT ARM FISTULAGRAM, BALLOON ANGIOPLASTY performed by Delia Leal MD at [facility]      FRACTURE SURGERY      fracture right ulnar, left tibia, and pelvis    OTHER SURGICAL HISTORY      open reduction internal fixation left radial shaft    OTHER SURGICAL HISTORY  08/19/2016    pericardial window    OTHER SURGICAL HISTORY  08/23/2016     r tesio insertion  / remove 8/16/2018    OTHER SURGICAL HISTORY Left 02/17/2017    insertion a-v fistula left arm    [place] ARTERIOVENOUS ANASTOMOSIS OPEN DIRECT Left 7/17/2018    REVISION AV FISTULA - LEFT ARM performed by Keely Garcia MD at [address] NON-TUNNELED CENTRAL VENOUS CATH AGE 5 YR/> N/A 5/10/2018    TESIO CATHETER INSERTION performed by Jeronimo Jackson MD at [facility] PRERETINAL MEMBRANE Left 8/28/2018    PARS PLANA VITRECTOMY 25 GAUGE, VITREOUS HEMORRHAGE REMOVAL, ENDO LASER, AIR/FLUID  EXCHANGE LEFT EYE performed by Jolly Vasques MD at [address] N/A 1/29/2021    PERINEAL ABCESS INCISION AND DRAINAGE (LITHOTOMY) performed by Steven Clark MD at [address]     Current Medications:   Scheduled Meds:   buPROPion  100 mg Oral Q48H    carvedilol  50 mg Oral QAM    vitamin D  2,000 Units Oral Daily    famotidine  20 mg Oral QAM    hydrALAZINE  25 mg Oral 3 times per day    insulin glargine-yfgn  12 Units SubCUTAneous Nightly    insulin lispro  10 Units SubCUTAneous TID AC    sodium chloride flush  10 mL IntraVENous 2 times per day    heparin (porcine)  5,000 Units SubCUTAneous 3 times per day    vancomycin (VANCOCIN) intermittent dosing (placeholder)   Other RX Placeholder    azithromycin  500 mg IntraVENous Q24H    cefepime  1,000 mg IntraVENous Q24H     Continuous Infusions:   sodium chloride      dextrose       PRN Meds:albuterol, sodium chloride flush, sodium chloride, promethazine **OR** ondansetron, polyethylene glycol, acetaminophen **OR** acetaminophen, glucose, dextrose bolus **OR** dextrose bolus, glucagon (rDNA), dextrose    Allergies:  Patient has no known allergies.     Social History:   Social History     Socioeconomic History    Marital status:      Spouse name: None Number of children: None    Years of education: None    Highest education level: None   Tobacco Use    Smoking status: Some Days     Years: 15.00     Types: Cigarettes    Smokeless tobacco: Never   Vaping Use    Vaping Use: Never used   Substance and Sexual Activity    Alcohol use: Not Currently     Alcohol/week: 0.0 standard drinks    Drug use: No   Social History Narrative    ** Merged History Encounter **          Social Determinants of Health     Financial Resource Strain: Low Risk     Difficulty of Paying Living Expenses: Not hard at all   Food Insecurity: No Food Insecurity    Worried About Running Out of Food in the Last Year: Never true    Ran Out of Food in the Last Year: Never true     Tobacco: No  Alcohol: No  Pets: No  Travel: No    Family History:       Problem Relation Age of Onset    Stroke Mother     Cancer Father     Diabetes Paternal Aunt    . Otherwise non-pertinent to the chief complaint. REVIEW OF SYSTEMS:    CONSTITUTIONAL:  No chills, fevers or night sweats. No loss of weight. EYES:  No double vision or drainage from eyes, ears or throat. HEENT:  No neck stiffness. No dysphagia. No drainage from eyes, ears or throat  RESPIRATORY:  No cough, productive sputum or hemoptysis. CARDIOVASCULAR:  No chest pain, palpitations, orthopnea or dyspnea on exertion. GASTROINTESTINAL:  No nausea, vomiting, diarrhea or constipation or hematochezia   GENITOURINARY:  No frequency burning dysuria or hematuria. INTEGUMENT/BREAST:  No rash or breast masses. HEMATOLOGIC/LYMPHATIC:  No lymphadenopathy or blood dyscrasics. ALLERGIC/IMMUNOLOGIC:  No anaphylaxis. ENDOCRINE:  No polyuria or polydipsia or temperature intolerance. MUSCULOSKELETAL:  No myalgia or arthralgia. Full ROM. NEUROLOGICAL:  No focal motor sensory deficit. BEHAVIOR/PSYCH:  No psychosis.      PHYSICAL EXAM:    Vitals:    BP (!) 106/51   Pulse 93   Temp 98 °F (36.7 °C) (Temporal)   Resp (!) 32   Ht 4' 11\" (1.499 m)   Wt 145 lb 8.1 oz (66 kg)   SpO2 98%   BMI 29.39 kg/m²   Constitutional: The patient is awake, alert, and oriented. Skin: Warm and dry. No rashes were noted. No jaundice. HEENT: Eyes show round, and reactive pupils. Moist mucous membranes, no ulcerations, no thrush. Neck: Supple to movements. No lymphadenopathy. Chest: Bilateral crackles heard. Cardiovascular: S1 and S2 are rhythmic and regular. No murmurs appreciated. Abdomen: Positive bowel sounds to auscultation. Benign to palpation. No masses felt. No hepatosplenomegaly. Genitourinary: Deferred  Extremities: No clubbing, no cyanosis, no edema. Musculoskeletal: No pain in range of motion of any joints  Neurological: Following commands, no focal neurodeficit.   Lines: peripheral      CBC+dif:  Recent Labs     02/11/23  0447 02/11/23  0645 02/11/23  1410   WBC 7.2 6.9 7.4   HGB 9.0* 8.9* 8.6*   HCT 26.2* 25.2* 25.5*   MCV 93.2 95.1 96.6    136 136   NEUTROABS 5.57 5.17 5.80     Lab Results   Component Value Date    CRP 6.7 (H) 02/11/2023    CRP 0.9 (H) 03/18/2022    CRP 0.1 11/05/2019     No results found for: [facility]  Lab Results   Component Value Date    SEDRATE 29 (H) 03/18/2022    SEDRATE 83 (H) 11/28/2018    SEDRATE 57 (H) 05/07/2018     Lab Results   Component Value Date    ALT 24 02/11/2023    AST 21 02/11/2023    ALKPHOS 124 (H) 02/11/2023    BILITOT 0.6 02/11/2023     Lab Results   Component Value Date/Time     02/11/2023 02:10 PM    K 3.6 02/11/2023 02:10 PM    K 4.4 02/11/2023 04:47 AM    CL 96 02/11/2023 02:10 PM    CO2 27 02/11/2023 02:10 PM    BUN 17 02/11/2023 02:10 PM    CREATININE 4.3 02/11/2023 02:10 PM    GFRAA 5 07/29/2022 01:14 AM    LABGLOM 13 02/11/2023 02:10 PM    GLUCOSE 225 02/11/2023 02:10 PM    GLUCOSE 279 07/27/2011 04:40 PM    PROT 6.7 02/11/2023 02:10 PM    LABALBU 3.3 02/11/2023 02:10 PM    LABALBU 3.9 01/10/2011 01:40 PM    CALCIUM 8.8 02/11/2023 02:10 PM    BILITOT 0.6 02/11/2023 02:10 PM    ALKPHOS 124 02/11/2023 02:10 PM    AST 21 02/11/2023 02:10 PM    ALT 24 02/11/2023 02:10 PM       Lab Results   Component Value Date/Time    PROTIME 13.6 02/11/2023 02:24 PM    INR 1.2 02/11/2023 02:24 PM       Lab Results   Component Value Date/Time    TSH 3.850 01/27/2021 09:52 PM       Lab Results   Component Value Date/Time    COLORU Yellow 11/23/2017 10:05 PM    PHUR 8.5 11/23/2017 10:05 PM    LABCAST FEW 09/29/2013 02:15 PM    LABCAST FEW 09/29/2013 02:15 PM    WBCUA 2-5 11/23/2017 10:05 PM    WBCUA NONE 11/21/2010 10:30 PM    RBCUA 5-10 11/23/2017 10:05 PM    RBCUA 0-1 09/29/2013 02:15 PM    TRICHOMONAS RARE 08/16/2016 11:05 PM    BACTERIA RARE 11/23/2017 10:05 PM    CLARITYU Clear 11/23/2017 10:05 PM    SPECGRAV 1.015 11/23/2017 10:05 PM    LEUKOCYTESUR Negative 11/23/2017 10:05 PM    UROBILINOGEN 0.2 11/23/2017 10:05 PM    BILIRUBINUR Negative 11/23/2017 10:05 PM    BILIRUBINUR NEGATIVE 11/21/2010 10:30 PM    BLOODU MODERATE 11/23/2017 10:05 PM    GLUCOSEU 500 11/23/2017 10:05 PM    GLUCOSEU >=1000 11/21/2010 10:30 PM       No results found for: EQB0WFS, BEART, D0URWKLT, PHART, THGBART, TMR5EGO, PO2ART, XLK3FUB  Radiology:  CTA PULMONARY W CONTRAST   Final Result   1. Cardiomegaly with small right and trace left-sided pleural effusions, as   well as diffuse ground-glass infiltrates and interlobular septal thickening   felt to represent cardiogenic pulmonary edema. 2. Patchy alveolar consolidative changes are noted as well, which may also be   related to pulmonary edema though can be seen with multilobar pneumonia. 3. Mild mediastinal reactive lymphadenopathy. XR CHEST PORTABLE   Final Result   Right lung airspace opacities. Correlate with pneumonia clinically. Microbiology:  Pending  No results for input(s): BC in the last 72 hours. No results for input(s): ORG in the last 72 hours. No results for input(s): Derrick Gardiner in the last 72 hours. No results for input(s): STREPNEUMAGU in the last 72 hours.   No results for input(s): LP1UAG in the last 72 hours. No results for input(s): ASO in the last 72 hours. No results for input(s): CULTRESP in the last 72 hours. Assessment:  Sepsis due to bilateral multifocal pneumonia  Bilateral patchy opacities in the CTA chest consistent with bilateral multifocal pneumonia versus pulmonary edema. ESRD on HD  Acute hypoxic respiratory failure    Plan:    Continue cefepime, azithromycin and vancomycin. Send blood cultures. Send respiratory cultures. Patient is being transferred to the MICU for borderline hypotension after hemodialysis. Rest of the sepsis management as per MICU team.  We will continue to follow. Thank you for having us see this patient in consultation. I will be discussing this case with the treating physicians.       Electronically signed by Robert Urbina MD on 2/11/2023 at 3:50 PM

## 2023-02-11 NOTE — FLOWSHEET NOTE
02/10/23 2041   Vital Signs   BP (!) 173/82   Temp 97.4 °F (36.3 °C)   Heart Rate (!) 104   Resp 20   Post-Hemodialysis Assessment   Post-Treatment Procedures Blood returned; Access bleeding time < 10 minutes   Machine Disinfection Process Acid/Vinegar Clean;Heat Disinfect; Exterior Machine Disinfection   Rinseback Volume (ml) 300 ml   Blood Volume Processed (Liters) 57.3 l/min   Dialyzer Clearance Lightly streaked   Duration of Treatment (minutes) 150 minutes   Hemodialysis Intake (ml) 300 ml   Hemodialysis Output (ml) 2300 ml   NET Removed (ml) 2000   Tolerated Treatment Good   Patient Response to Treatment blood returned, ate dinner, vitals stable, alert and orientated   Bilateral Breath Sounds Diminished   Edema Generalized   Physician Notified No   Time Off 2025   Patient Disposition Return to room

## 2023-02-11 NOTE — CODE DOCUMENTATION
Primary notified of RRT and transfer, Family called.  Spoke to [place]ΚΑ, sister @ [phone]   Please see narrator for RRT details     Jessee Demarco RN  02/11/23  2:23 PM

## 2023-02-11 NOTE — PROGRESS NOTES
Hospitalist Progress Note      SYNOPSIS: Patient admitted on 2/10/2023 for shortness of breath. Patient presented to the emergency department with shortness of breath. Patient has a history of end-stage renal disease on hemodialysis. Has missed the past 2 dialysis sessions. Patient reports worsening of shortness of breath with exertion. Patient reports a dry nonproductive cough. Vital signs show the patient to be tachycardic with a rate of 110. On arrival SPO2 was 78% on room air. Currently she is 93% on 2 L nasal cannula. The patient is afebrile. Laboratory studies demonstrate potassium 5.2, BUN 88, creatinine 16.1, anion gap 21, magnesium 3.0, glucose 72, phosphorus 10.6, hemoglobin 9.2. Chest x-ray shows right lung airspace opacities. Patient was started on empiric antibiotics for healthcare associated pneumonia. SUBJECTIVE:    Patient seen and examined in the dialysis unit. Denies any chest pain. Continues to have shortness of breath. Records reviewed. Stable overnight. No other overnight issues reported. Temp (24hrs), Av.7 °F (36.5 °C), Min:97.4 °F (36.3 °C), Max:98.2 °F (36.8 °C)    DIET: ADULT DIET; Regular; 4 carb choices (60 gm/meal); Low Potassium (Less than 3000 mg/day); Low Phosphorus (Less than 1000 mg)  CODE: Full Code    Intake/Output Summary (Last 24 hours) at 2023 0905  Last data filed at 2023 0646  Gross per 24 hour   Intake 420 ml   Output 2300 ml   Net -1880 ml       OBJECTIVE:    /63   Pulse (!) 117   Temp 97.8 °F (36.6 °C)   Resp 21   Ht 4' 11\" (1.499 m)   Wt 166 lb 14.2 oz (75.7 kg)   SpO2 92%   BMI 33.71 kg/m²     General appearance: No apparent distress, appears stated age and cooperative. HEENT:  Conjunctivae/corneas clear. Neck: Supple. No jugular venous distention.    Respiratory: Clear to auscultation bilaterally, normal respiratory effort  Cardiovascular: Regular rate rhythm, normal S1-S2  Abdomen: Soft, nontender, nondistended  Musculoskeletal: No clubbing, cyanosis, no bilateral lower extremity edema. Brisk capillary refill. Skin:  No rashes  on visible skin  Neurologic: awake, alert and following commands       ASSESSMENT:  -Acute respiratory failure with hypoxia  -Possible healthcare associated pneumonia  -Hypomagnesemia  -Chronic anemia  -ESRD on hemodialysis  -Missed dialysis  -Sinus tachycardia  -Diabetes mellitus  -Hypertension        PLAN:  -Admit to medicine  -Consult nephrology  -Supplemental O2 as needed  -Continue vancomycin, ceftriaxone and doxycycline, consult ID for further evaluation management  -Check procalcitonin significantly elevated  -Hemodialysis per nephrology guidance  -Monitor serum electrolytes  -Telemetry  -Respiratory cultures  -Continue home medications    DISPOSITION:   Unclear discharge disposition, ongoing treatment for fluid overload as well as pneumonia.     Medications:  REVIEWED DAILY    Infusion Medications    sodium chloride      dextrose       Scheduled Medications    cefTRIAXone (ROCEPHIN) IV  1,000 mg IntraVENous Q24H    doxycycline hyclate  100 mg Oral 2 times per day    buPROPion  100 mg Oral Q48H    carvedilol  50 mg Oral QAM    vitamin D  2,000 Units Oral Daily    famotidine  20 mg Oral QAM    hydrALAZINE  25 mg Oral 3 times per day    insulin glargine-yfgn  12 Units SubCUTAneous Nightly    insulin lispro  10 Units SubCUTAneous TID AC    sodium chloride flush  10 mL IntraVENous 2 times per day    heparin (porcine)  5,000 Units SubCUTAneous 3 times per day    vancomycin (VANCOCIN) intermittent dosing (placeholder)   Other RX Placeholder     PRN Meds: albuterol, sodium chloride flush, sodium chloride, promethazine **OR** ondansetron, polyethylene glycol, acetaminophen **OR** acetaminophen, glucose, dextrose bolus **OR** dextrose bolus, glucagon (rDNA), dextrose    Labs:     Recent Labs     02/10/23  1750 02/11/23  0447 02/11/23  0645   WBC 6.6 7.2 6.9   HGB 9.2* 9.0* 8.9*   HCT 26.4* 26.2* 25.2*    155 136       Recent Labs     02/10/23  1750 02/11/23 0447 02/11/23  0645    137 134   K 5.2* 4.4 4.7   CL 99 95* 94*   CO2 18* 23 23   BUN 88* 47* 49*   CREATININE 16.1* 9.6* 9.7*   CALCIUM 9.6 9.3 9.3   PHOS 10.6*  --   --        Recent Labs     02/11/23  0447 02/11/23  0645   PROT 7.6 7.5   ALKPHOS 139* 135*   ALT 28 26   AST 27 25   BILITOT 0.6 0.6       No results for input(s): INR in the last 72 hours. No results for input(s): Sosa Shanks in the last 72 hours. Chronic labs:    Lab Results   Component Value Date    CHOL 155 10/08/2013    TRIG 68 10/08/2013    HDL 40 01/29/2021    LDLCALC 41 01/29/2021    TSH 3.850 01/27/2021    INR 1.1 11/26/2018    LABA1C 7.7 11/30/2022       Radiology: REVIEWED DAILY    +++++++++++++++++++++++++++++++++++++++++++++++++  Mitesh Palumbo MD  [employer] - [address] [place], [place]  +++++++++++++++++++++++++++++++++++++++++++++++++  NOTE: This report was transcribed using voice recognition software. Every effort was made to ensure accuracy; however, inadvertent computerized transcription errors may be present.

## 2023-02-11 NOTE — SIGNIFICANT EVENT
Critical Care - Rapid Response Team Note      Date of event: 2/11/2023   Time of event: 159    Michelle Nettles 37 y.o. year old female   YOB: 1985     PCP:  May Waters, DO   [facility]   Witnessed? : [x]Yes  [] No  Initial Code status: [x] Full  [] DNR-CCA  []DNR-CC    []Limited  ______________________________________________________________________  Reason for RRT:   Hypotension    Chief Complaint for this admission:   Chief Complaint   Patient presents with    Shortness of Breath     Hasn't had dialysis since Saturday/Denies Chest pain       Admit date:  2/10/2023     Admitting Diagnosis: Acute respiratory failure (HCC) [J96.00]  ESRD (end stage renal disease) on dialysis (HCC) [N18.6, Z99.2]  Acute respiratory failure with hypoxia (HCC) [J96.01]      Current Diagnosis: The primary encounter diagnosis was Acute respiratory failure with hypoxia (HCC). A diagnosis of ESRD (end stage renal disease) on dialysis (HCC) was also pertinent to this visit.     Subjective:   This is a 37 years old female patient, ESRD dependent, initially presented to ED with shortness of breath, reported as missing 2 dialysis sessions.  Initial work-up also concerning for bilateral pulmonary infiltrates, likely multifocal pneumonia was started with antibiotics.    RRT was called due to hypotension, patient went through multiple sessions of dialysis as pull of total 7 L of the last 2 session, on arrival, patient is awake alert follow commands, blood pressure is 85/46, 1 L is ordered given the history, also Pro-Js is elevated on admission, given the hypotension on present for RRT and reasonable cause to believe patient may be septic.  Rebound lab was sent in addition to inflammatory markers, reviewed the orders panculture has already been obtained on admission patient is already covered with antibiotics pending adjustment pending cultures.  Case discussed with on-call intensivist Dr. Escobar,  decision was made to transfer patient to ICU for better monitoring and management in case there is a shock septic shock lactic acid is also sent during RRT ICU team to follow. Objective:   Initial Assessment on arrival:  Vital signs: Temp: 98F, BP: 85/46, RR: 18, HR: 96 SpO2:98%    Airway and Condition: Conscious, Pulse present, and Airway Open/Clear noted    Lungs And Circulation: rhonchi heard in b/l  noted                  Neurologic: follows commands noted        Initial Interventions: Labs ordered: I.Systems labs  Imaging ordered:   Meds/Fluids/Rx given:   1 L       ICU transfer, case discuss with Dr. Jimenez Arevalo      RRT Assessment and Plan:    Donte Moscoso is a 44 y.o. female with  has a past medical history of JOLLY (acute kidney injury) ([facility] AVF (arteriovenous fistula) ([facility] Chronic kidney disease, Dialysis AV fistula malfunction, initial encounter ([facility] DM type 1 (diabetes mellitus, type 1) ([facility] Encounter regarding vascular access for dialysis for ESRD ([facility] ESRD (end stage renal disease) ([facility] Hemodialysis patient ([facility] Hypertension, and Tobacco abuse. who was admitted on 2/10/2023 with admitting diagnosis Acute respiratory failure ([address] [J96.00]  ESRD (end stage renal disease) on dialysis ([facility] [N18.6, Z99.2]  Acute respiratory failure with hypoxia ([facility] [J96.01]  . RRT was called on 2/11/2023 . Initial assessment and interventions as noted above. Current problems include:   Hypotension secondary to hypotension likely secondary to dialysis versus sepsis    Plan:   IV fluid resuscitation  ICU transfer  [place] labs  Continue abx coverage pending Cx result    Discussed with Dr. Jimenez Arevalo  ?     Code status: [x] Full  [] DNR-CCA  []DNR-CC []Limited    Disposition:  [] No transfer   [] Transfer to monitor floor  [x] Transfer to: [x] MICU [] NICU [] CVICU [] SICU    Patients family updated:     [x] Yes  [] No   Discussed with:  [x] Critical Care Intensivist: Dr. Jimenez Arevalo      [] Primary Care Provider:       [] Other: ?    Rashmi Rutherford MD PGY-3  2/11/2023 2:31 PM  Attending Physician: Dr. Jimenez Arevalo

## 2023-02-11 NOTE — FLOWSHEET NOTE
02/11/23 1042   Vital Signs   BP (!) 152/64   Temp 99 °F (37.2 °C)   Heart Rate (!) 119   Weight 158 lb 11.7 oz (72 kg)   Weight Method Bed scale   Percent Weight Change -4.89   Post-Hemodialysis Assessment   Post-Treatment Procedures Blood returned; Access bleeding time < 10 minutes   Machine Disinfection Process Exterior Machine Disinfection   Rinseback Volume (ml) 300 ml   Blood Volume Processed (Liters) 84.7 l/min   Dialyzer Clearance Lightly streaked   Duration of Treatment (minutes) 240 minutes   Heparin Amount Administered During Treatment (mL) 0 mL   Hemodialysis Intake (ml) 300 ml   Hemodialysis Output (ml) 3660 ml   NET Removed (ml) 3360   Tolerated Treatment Good   Patient Response to Treatment tolerated well, blood returned, needles pulled, sites held stasis achieved, bandaids applied, +thirll, +bruit

## 2023-02-11 NOTE — PROGRESS NOTES
Extended Infusion Policy     This patient is on medication that requires renal, weight, and/or indication dose adjustment. Date Body Weight IBW  Adjusted BW SCr  CrCl Dialysis status BMI   2/11/2023 158 lb 11.7 oz (72 kg) Ideal body weight: 48.8 kg (107 lb 8.4 oz)  Adjusted ideal body weight: 58.1 kg (128 lb 0.1 oz) Serum creatinine: 9.7 mg/dL (HH) 02/11/23 0645  Estimated creatinine clearance: 7 mL/min (A) HD Body mass index is 32.06 kg/m². Pharmacy has dose-adjusted the following medication(s):    Ordered Medication: Cefepime 1000mg q12h     Order Changed/converted to: Cefepime 1000mg q24h    These changes were made per protocol according to the [facility]   [employer] Extended Infusion Dose Adjustment Policy. *Please note this dose may need readjusted if patient's condition changes. Please contact pharmacy with any questions regarding these changes.     Sujata Brown, [facility]  2/11/2023  12:37 PM

## 2023-02-11 NOTE — PROGRESS NOTES
Nephrology Consult Note  Patient's Name: Angie Chris  11:46 AM  2/11/2023        Reason for Consult: ESRD HD dependent  Requesting Physician:  Michelle Chowdary DO    Chief Complaint: Shortness of breath; missed HD treatment for 1 week  History Obtained From:  patient and EHR    History of Present Ilness:    Angie Chris is a 44 y.o. female with a history of ESRD HD dependent, hypertension, type I DM. She presented to ED with complaints of increasing generalized edema including facial edema. This was associated with shortness of breath. She denies any chest pain. No cough. No fever or chills. Patient reports that she was unable to secure care for her special needs child. As a result she could not make her dialysis appointments. On arrival to ED vitals showed blood pressure 164/112, pulse 113, respirations 22. Temperature 97.5. Laboratory data showed high-sensitivity troponin 200, magnesium 3, phosphorus 10.6, WBC 6.6, hemoglobin 9.2, platelet count 665 K, potassium 5.2, CO2 18, BUN 88 and creatinine 16. Procalcitonin was 5.  4 9 chest x-ray showed right lung air space opacities viral respiratory panel was negative. CTA of the chest showed findings of pulmonary edema with question of multilobar pneumonia. There was no evidence of pulmonary embolism. Patient received vancomycin and ceftriaxone. She was subsequently taken to the hemodialysis suite for treatment. To be followed by admission to telemetry. Subjective:    2/11: Seen during hemodialysis; she states her breathing is much improved; she is having some cramping as a result of attempts to remove fluid    Allergies:  Patient has no known allergies.     Current Medications:    cefTRIAXone (ROCEPHIN) 1,000 mg in sterile water 10 mL IV syringe, Q24H  doxycycline hyclate (VIBRAMYCIN) capsule 100 mg, 2 times per day  albuterol (PROVENTIL) nebulizer solution 2.5 mg, Q6H PRN  buPROPion [facility]) extended release tablet 100 mg, Q48H  carvedilol (COREG) tablet 50 mg, QAM  vitamin D (CHOLECALCIFEROL) tablet 2,000 Units, Daily  famotidine (PEPCID) tablet 20 mg, QAM  hydrALAZINE (APRESOLINE) tablet 25 mg, 3 times per day  insulin glargine-yfgn (SEMGLEE-YFGN) injection vial 12 Units, Nightly  insulin lispro (HUMALOG) injection vial 10 Units, TID AC  sodium chloride flush 0.9 % injection 10 mL, 2 times per day  sodium chloride flush 0.9 % injection 10 mL, PRN  0.9 % sodium chloride infusion, PRN  heparin (porcine) injection 5,000 Units, 3 times per day  promethazine (PHENERGAN) tablet 12.5 mg, Q6H PRN   Or  ondansetron (ZOFRAN) injection 4 mg, Q6H PRN  polyethylene glycol (GLYCOLAX) packet 17 g, Daily PRN  acetaminophen (TYLENOL) tablet 650 mg, Q6H PRN   Or  acetaminophen (TYLENOL) suppository 650 mg, Q6H PRN  glucose chewable tablet 16 g, PRN  dextrose bolus 10% 125 mL, PRN   Or  dextrose bolus 10% 250 mL, PRN  glucagon injection 1 mg, PRN  dextrose 10 % infusion, Continuous PRN  vancomycin (VANCOCIN) intermittent dosing (placeholder), RX Placeholder  vancomycin (VANCOCIN) 1,000 mg in sodium chloride 0.9 % 250 mL IVPB (Isaías Hayes), Once      Review of Systems:   Pertinent items are noted in HPI. Physical exam:  Vitals:    02/11/23 1042   BP: (!) 152/64   Pulse: (!) 119   Resp:    Temp: 99 °F (37.2 °C)   SpO2:            General: No distress. Alert. Eyes: PERRL. No sclera icterus. No conjunctival injectio; periorbital edema and facial puffiness  ENT: No discharge. Pharynx clear. Neck: Trachea midline. Normal thyroid. Lungs: No accessory muscle use. No crackles. No wheezing. No rhonchi. CV: Regular rate. Regular rhythm. No murmur or rub. .   Abd: Non-tender. Non-distended. No masses. No organmegaly. Normal bowel sounds. Skin: Warm and dry. No nodule on exposed extremities. No rash on exposed extremities. Ext: No cyanosis, clubbing, 2+ pitting bilateral legs edema; LFA AVF cannulated  Neuro: Awake. Follows commands. Positive pupils/gag/corneals. Normal pain response. Data:   Labs:  Lab Results   Component Value Date     02/11/2023     02/11/2023     02/10/2023    K 4.7 02/11/2023    K 4.4 02/11/2023    K 5.2 (H) 02/10/2023    CL 94 (L) 02/11/2023    CO2 23 02/11/2023    CO2 23 02/11/2023    CO2 18 (L) 02/10/2023    CREATININE 9.7 (HH) 02/11/2023    CREATININE 9.6 (HH) 02/11/2023    CREATININE 16.1 (HH) 02/10/2023    BUN 49 (H) 02/11/2023    BUN 47 (H) 02/11/2023    BUN 88 (H) 02/10/2023    GLUCOSE 194 (H) 02/11/2023    GLUCOSE 176 (H) 02/11/2023    GLUCOSE 72 (L) 02/10/2023    PHOS 10.6 (HH) 02/10/2023    PHOS 6.6 (H) 02/01/2021    PHOS 5.7 (H) 01/29/2021    WBC 6.9 02/11/2023    WBC 7.2 02/11/2023    WBC 6.6 02/10/2023    HGB 8.9 (L) 02/11/2023    HGB 9.0 (L) 02/11/2023    HGB 9.2 (L) 02/10/2023    HCT 25.2 (L) 02/11/2023    HCT 26.2 (L) 02/11/2023    HCT 26.4 (L) 02/10/2023    MCV 95.1 02/11/2023     02/11/2023         Imaging:  XR CHEST PORTABLE    Result Date: 2/10/2023  EXAMINATION: ONE XRAY VIEW OF THE CHEST 2/10/2023 4:11 pm COMPARISON: 07/28/2022 HISTORY: ORDERING SYSTEM PROVIDED HISTORY: chest pain TECHNOLOGIST PROVIDED HISTORY: Reason for exam:->chest pain What reading provider will be dictating this exam?->CRC FINDINGS: Right lung opacities. There is no effusion or pneumothorax. Stable heart size. The osseous structures are without acute process. Right lung airspace opacities. Correlate with pneumonia clinically. Assessment/Plans    1. Acute pulmonary edema due to missed hemodialysis treatments  Hemodialysis  Fluid restriction    2. Hypertensive urgency  Suspect due to missed doses of BP meds  Adjust BP meds    3. Acute respiratory failure due to acute pulmonary edema  Supplemental oxygen  Improvement with dialysis    4. Severe hyperphosphatemia  Question if she is taking any phosphate binders  will begin phosphate binder  Monitor levels    5.   ESRD HD dependent  Missed 3 last HD treatments due to problems with childcare  Hemodialysis  Fluid restriction  SW at patient's dialysis facility to follow-up on this issue    6. Possible community-acquired pneumonia  Received Vanc  and doxycycline        Aishwarya Schmitz MD  11:46 AM  2/11/2023      Department of Internal Medicine  Section of Nephrology  Dialysis Note        PROCEDURE:  Patient seen on hemodialysis at 11:46 AM    PHYSICIAN:  Shay[facility] Gamboa M.D., [facility]    INDICATION:ESRD      RX:  See dialysis flowsheet for specifics on access, blood flow rate, dialysate baths, duration of dialysis, anticoagulation and other technical information.     COMMENTS:  Procedure in progress and tolerated       Aishwarya Schmitz MD, MD

## 2023-02-11 NOTE — ED NOTES
Patient took O2 off at some point, SpO2 dropped to 69% on room air. Patient then became diaphoretic, stating she feels hot. Patients heart rate into the 130's . Dr Emigdio Hudson notified and at bedside.  This RN completed an EKG, given to Dr Savita Lundberg, RN  02/10/23 2808

## 2023-02-11 NOTE — PROGRESS NOTES
Pharmacy Consultation Note  (Antibiotic Dosing and Monitoring)    Initial consult date: 2/11/23  Consulting physician/provider: Dr. Nuha Kendall  Drug: Vancomycin  Indication: HAP; 14 days     Age/  Gender Height Weight IBW  Allergy Information   37 y.o./female 4' 11\" (149.9 cm) 135 lb (61.2 kg)     Ideal body weight: 48.8 kg (107 lb 8.4 oz)  Adjusted ideal body weight: 59.5 kg (131 lb 4.3 oz)   Patient has no known allergies. Renal Function:  Recent Labs     02/10/23  1750 02/11/23  0447 02/11/23  0645   BUN 88* 47* 49*   CREATININE 16.1* 9.6* 9.7*       Intake/Output Summary (Last 24 hours) at 2/11/2023 1019  Last data filed at 2/11/2023 0646  Gross per 24 hour   Intake 420 ml   Output 2300 ml   Net -1880 ml       Vancomycin Monitoring:  Trough:  No results for input(s): VANCOTROUGH in the last 72 hours. Random:  No results for input(s): VANCORANDOM in the last 72 hours. No results for input(s): Roger Sacramento in the last 72 hours. Historical Cultures:  Organism   Date Value Ref Range Status   05/19/2021 Anaerobic gram negative sharon (A)  Final   05/19/2021 Staphylococcus epidermidis (A)  Final   05/19/2021 Corynebacterium species (A)  Final     No results for input(s): BC in the last 72 hours. Vancomycin Administration Times:    Recent vancomycin administrations                     vancomycin (VANCOCIN) 1,250 mg in sodium chloride 0.9 % 250 mL IVPB (mg) 1,250 mg New Bag 02/11/23 0000                  Assessment:  Patient is a 44 y.o. female who has been initiated on vancomycin  Estimated Creatinine Clearance: 7 mL/min (A) (based on SCr of 9.7 mg/dL [facility])).   To dose vancomycin, pharmacy will be utilizing dosing based off of levels due to patient requiring hemodialysis  2/11: Patient to receive HD today    Plan:  Vancomycin 1000 mg IV x 1 dose  Random level tomorrow am on 2/12/23  Will continue to monitor renal function   Pharmacy to follow      Guy Villalta, PharmD, [facility], [facility] 2/11/2023 10:19 AM

## 2023-02-11 NOTE — H&P
Hospitalist History & Physical      PCP: Barrett Lundborg, DO    Date of Service: Pt seen/examined on 2/10/2023     Chief Complaint:  had concerns including Shortness of Breath (Hasn't had dialysis since Saturday/Denies Chest pain). History Of Present Illness:    Ms. Ruby Montemayor, a 44y.o. year old female  who  has a past medical history of JOLLY (acute kidney injury) ([facility] AVF (arteriovenous fistula) ([facility] Chronic kidney disease, Dialysis AV fistula malfunction, initial encounter ([facility] DM type 1 (diabetes mellitus, type 1) ([facility] Encounter regarding vascular access for dialysis for ESRD ([facility] ESRD (end stage renal disease) ([facility] Hemodialysis patient ([facility] Hypertension, and Tobacco abuse. Patient presented to the emergency department with shortness of breath. Patient has a history of end-stage renal disease on hemodialysis. Has missed the past 2 dialysis sessions. Patient reports worsening of shortness of breath with exertion. Patient reports a dry nonproductive cough. Vital signs show the patient to be tachycardic with a rate of 110. On arrival SPO2 was 78% on room air. Currently she is 93% on 2 L nasal cannula. The patient is afebrile. Laboratory studies demonstrate potassium 5.2, BUN 88, creatinine 16.1, anion gap 21, magnesium 3.0, glucose 72, phosphorus 10.6, hemoglobin 9.2. Chest x-ray shows right lung airspace opacities. Patient was started on empiric antibiotics for healthcare associated pneumonia. As she does not have a fever or white count we will get a procalcitonin.       Past Medical History:   Diagnosis Date    JOLLY (acute kidney injury) ([facility] 4/5/2016    AVF (arteriovenous fistula) ([facility] 02/19/2018    let arm    Chronic kidney disease     [facility] 11/7/2019    DM type 1 (diabetes mellitus, type 1) ([facility]     Encounter regarding vascular access for dialysis for ESRD ([facility] 07/12/2018    ESRD (end stage renal disease) [facility])     Hemodialysis patient ([facility]     amrita dawson  fri / graft in left arm    Hypertension     Tobacco abuse 2016       Past Surgical History:   Procedure Laterality Date     SECTION      CYST INCISION AND DRAINAGE  2011    perirectal abscess. [facility]. Dr. Mya Patel Left 2019    LEFT ARM FISTULAGRAM, BALLOON ANGIOPLASTY performed by Zeina Shoemaker MD at [email or web address]      FRACTURE SURGERY      fracture right ulnar, left tibia, and pelvis    OTHER SURGICAL HISTORY      open reduction internal fixation left radial shaft    OTHER SURGICAL HISTORY  2016    pericardial window    OTHER SURGICAL HISTORY  2016     r tesio insertion  / remove 2018    OTHER SURGICAL HISTORY Left 2017    insertion a-v fistula left arm    MO ARTERIOVENOUS ANASTOMOSIS OPEN DIRECT Left 2018    REVISION AV FISTULA - LEFT ARM performed by Zeina Shoemaker MD at [address] NON-TUNNELED CENTRAL VENOUS CATH AGE 5 YR/> N/A 5/10/2018    TESIO CATHETER INSERTION performed by Yossi Sanchez MD at [address] [facility] PRERETINAL MEMBRANE Left 2018    PARS PLANA VITRECTOMY 25 GAUGE, VITREOUS HEMORRHAGE REMOVAL, ENDO LASER, AIR/FLUID  EXCHANGE LEFT EYE performed by Timo Thrasher MD at [address] [facility] N/A 2021    PERINEAL ABCESS INCISION AND DRAINAGE (LITHOTOMY) performed by Hebert Sheppard MD at [address]       Prior to Admission medications    Medication Sig Start Date End Date Taking?  Authorizing Provider   Glucose (TRUEPLUS) 15 GM/32ML GEL Take 32 mLs by mouth daily as needed (hypoglycemia, BG <70) 23   Lon Sotelo, DO   famotidine (PEPCID) 20 MG tablet TAKE 1 TABLET BY MOUTH EVERY MORNING 23   May Waters, DO   ACETAMINOPHEN EXTRA STRENGTH 500 MG tablet TAKE 1 TABLET BY MOUTH EVERY 6 HOURS AS NEEDED FOR PAIN 1/20/23   Lauryn Marino DO   Continuous Blood Gluc Transmit (DEXCOM G6 TRANSMITTER) MISC 1 each by Does not apply route every 3 months 1/20/23 4/20/23  Lauryn Marino DO   insulin lispro, 1 Unit Dial, (HUMALOG/ADMELOG) 100 UNIT/ML SOPN Inject 10 Units into the skin 3 times daily (before meals) *Plus Sliding Scale* MAX 50 units a day 11/30/22   Lauryn Marino DO   brompheniramine-pseudoephedrine-DM (BROMFED DM) 2-30-10 MG/5ML syrup Take 10 mLs by mouth 4 times daily as needed for Congestion or Cough 10/28/22   Lauryn Marino DO   albuterol (PROVENTIL) (2.5 MG/3ML) 0.083% nebulizer solution Take 3 mLs by nebulization every 6 hours as needed for Wheezing or Shortness of Breath 3/15/22 11/30/22  Lauryn Marino DO   clotrimazole-betamethasone (LOTRISONE) 1-0.05 % cream Apply topically 2 times daily. 3/15/22   Lauryn Marino DO   bacitracin zinc 500 UNIT/GM ointment Apply topically daily Apply topically 2 times daily.  1/25/22   Lauryn Marino DO   Probiotic Product (PROBIOTIC ADVANCED PO) Take by mouth    Historical Provider, MD   Folic Acid 0.8 MG CAPS Take by mouth    Historical Provider, MD   buPROPion [facility]) 100 MG extended release tablet Take 1 tablet by mouth every 48 hours 9/21/21 3/20/22  Lauryn Marino DO   Cholecalciferol (VITAMIN D3) 50 MCG (2000 UT) CAPS Take 1 capsule by mouth daily 9/10/21   Lauryn Marino DO   albuterol sulfate  (90 Base) MCG/ACT inhaler Inhale 2 puffs into the lungs every 4-6 hours as needed for Wheezing or Shortness of Breath 8/17/21   Aretha Young APRN - CNP   hydrALAZINE (APRESOLINE) 50 MG tablet Take 1 tablet by mouth every 8 hours  Patient taking differently: Take 25 mg by mouth every 8 hours 1/30/21   Sophy Browning DO   Insulin Glargine, 2 Unit Dial, (TOUJEO MAX SOLOSTAR) 300 UNIT/ML SOPN Inject 16 Units into the skin nightly     Historical Provider, MD   medroxyPROGESTERone (DEPO-PROVERA) 150 MG/ML injection Inject 150 mg into the muscle every 3 months  11/17/19   Historical Provider, MD   carvedilol (COREG) 25 MG tablet Take 50 mg by mouth every morning 2 tabs am and one tab pm 7/7/19   Historical Provider, MD         Allergies:  Patient has no known allergies. Social History:    TOBACCO:   reports that she has been smoking cigarettes. She has never used smokeless tobacco.  ETOH:   reports that she does not currently use alcohol. Family History:    Reviewed in detail and negative for DM, CAD, Cancer, CVA. Positive as follows\"      Problem Relation Age of Onset    Stroke Mother     Cancer Father     Diabetes Paternal Aunt        REVIEW OF SYSTEMS:   Pertinent positives as noted in the HPI. All other systems reviewed and negative. PHYSICAL EXAM:  BP (!) 173/82   Pulse (!) 104   Temp 97.4 °F (36.3 °C)   Resp 20   Ht 4' 11\" (1.499 m)   Wt 135 lb (61.2 kg)   SpO2 100%   BMI 27.27 kg/m²   General appearance: No apparent distress, appears stated age and cooperative. Face appears swollen  HEENT: Normal cephalic, atraumatic without obvious deformity. Pupils equal, round, and reactive to light. Extra ocular muscles intact. Conjunctivae/corneas clear. Neck: Supple, with full range of motion. No jugular venous distention. Trachea midline. Respiratory: CTA  Cardiovascular: Tachycardic  Abdomen: S/NT/ND  Musculoskeletal: No clubbing, cyanosis, edema of bilateral lower extremities. Brisk capillary refill. Skin: Normal skin color. No rashes or lesions. Neurologic:  Neurovascularly intact without any focal sensory/motor deficits.  Cranial nerves: II-XII intact, grossly non-focal.  Psychiatric: Alert and oriented, thought content appropriate, normal insight    Reviewed EKG and CXR personally      CBC:   Recent Labs     02/10/23  1750   WBC 6.6   RBC 2.78*   HGB 9.2*   HCT 26.4*   MCV 95.0   RDW 16.1*        BMP:   Recent Labs     02/10/23  1750      K 5.2*   CL 99   CO2 18*   BUN 88* CREATININE 16.1*   MG 3.0*   PHOS 10.6*     LFT:  No results for input(s): PROT, ALB, ALKPHOS, ALT, AST, BILITOT, AMYLASE, LIPASE in the last 72 hours. CE:  No results for input(s): Betty Mould in the last 72 hours. PT/INR: No results for input(s): INR, APTT in the last 72 hours. BNP: No results for input(s): BNP in the last 72 hours. ESR:   Lab Results   Component Value Date    SEDRATE 29 (H) 03/18/2022     CRP:   Lab Results   Component Value Date    CRP 0.9 (H) 03/18/2022     D Dimer:   Lab Results   Component Value Date    DDIMER 279 08/16/2016      Folate and B12:   Lab Results   Component Value Date    KYNZTFHH22 627 01/27/2021   ,   Lab Results   Component Value Date    FOLATE 19.4 01/27/2021     Lactic Acid:   Lab Results   Component Value Date    LACTA 1.0 02/10/2023     Thyroid Studies:   Lab Results   Component Value Date    TSH 3.850 01/27/2021       Oupatient labs:  Lab Results   Component Value Date    CHOL 155 10/08/2013    TRIG 68 10/08/2013    HDL 40 01/29/2021    LDLCALC 41 01/29/2021    TSH 3.850 01/27/2021    INR 1.1 11/26/2018    LABA1C 7.7 11/30/2022       Urinalysis:    Lab Results   Component Value Date/Time    NITRU Negative 11/23/2017 10:05 PM    [address] 2-5 11/23/2017 10:05 PM    WBCUA NONE 11/21/2010 10:30 PM    BACTERIA RARE 11/23/2017 10:05 PM    RBCUA 5-10 11/23/2017 10:05 PM    RBCUA 0-1 09/29/2013 02:15 PM    BLOODU MODERATE 11/23/2017 10:05 PM    SPECGRAV 1.015 11/23/2017 10:05 PM    GLUCOSEU 500 11/23/2017 10:05 PM    GLUCOSEU >=1000 11/21/2010 10:30 PM       Imaging:  XR CHEST PORTABLE    Result Date: 2/10/2023  EXAMINATION: ONE XRAY VIEW OF THE CHEST 2/10/2023 4:11 pm COMPARISON: 07/28/2022 HISTORY: ORDERING SYSTEM PROVIDED HISTORY: chest pain TECHNOLOGIST PROVIDED HISTORY: Reason for exam:->chest pain What reading provider will be dictating this exam?->CRC FINDINGS: Right lung opacities. There is no effusion or pneumothorax. Stable heart size.   The osseous structures are without acute process. Right lung airspace opacities. Correlate with pneumonia clinically. ASSESSMENT:  -Acute respiratory failure with hypoxia  -Possible healthcare associated pneumonia  -Hypomagnesemia  -Chronic anemia  -ESRD on hemodialysis  -Missed dialysis  -Sinus tachycardia  -Diabetes mellitus  -Hypertension      PLAN:  -Admit to medicine  -Consult nephrology  -Supplemental O2 as needed  -Continue vancomycin, ceftriaxone and doxycycline  -Check procalcitonin. If negative stop antibiotics  -Hemodialysis per nephrology guidance  -Monitor serum electrolytes  -Telemetry  -Respiratory cultures  -Continue home medications        Diet: No diet orders on file  Code Status: Prior  Surrogate decision maker confirmed with patient:   Extended Emergency Contact Information  Primary Emergency Contact: Brennon Js [address] Phone: [phone]  Relation: Other  Secondary Emergency Contact: Nella Caceres [address] Phone: [phone]  Relation: Brother/Sister    DVT Prophylaxis: []Lovenox []Heparin []PCD [] [address] []Encouraged ambulation  Disposition: []Med/Surg [] Intermediate [] ICU/CCU  Admit status: [] Observation [] Inpatient     +++++++++++++++++++++++++++++++++++++++++++++++++  Booker Juarez DO  +++++++++++++++++++++++++++++++++++++++++++++++++  NOTE: This report was transcribed using voice recognition software. Every effort was made to ensure accuracy; however, inadvertent computerized transcription errors may be present.

## 2023-02-11 NOTE — ED PROVIDER NOTES
HPI:  2/10/23,   Time: 7:19 PM LOGAN Leon is a 44 y.o. female presenting to the ED for SOB missed HD x 2 days , beginning 6 days ago. The complaint has been persistent, severe in severity, and worsened by light exertion, deep breath, cough. Patient reports dyspnea on exertion. Not nonproductive cough. States he is missed dialysis twice usually goes . She has no chest pain. No hemoptysis or expectorant. No close contacts ill. Reports no fevers or chills. She was found in triage to be 78% on room air she was placed on 6 L nasal cannula pulse ox improved to 100% with supplemental oxygen. No history of VTE     Review of Systems:   Pertinent positives and negatives are stated within HPI, all other systems reviewed and are negative.    --------------------------------------------- PAST HISTORY ---------------------------------------------  Past Medical History:  has a past medical history of JOLLY (acute kidney injury) ([facility] [address] AVF (arteriovenous fistula) ([facility] [address] Chronic kidney disease, Dialysis AV fistula malfunction, initial encounter ([facility] [address] DM type 1 (diabetes mellitus, type 1) ([facility] [address] Encounter regarding vascular access for dialysis for ESRD ([facility] [address] ESRD (end stage renal disease) ([facility] [address] Hemodialysis patient ([facility] [address] Hypertension, and Tobacco abuse. Past Surgical History:  has a past surgical history that includes Dilation and curettage of uterus (); Breast cyst incision and drainage (2011); other surgical history (); fracture surgery (); other surgical history (2016); other surgical history (2016); other surgical history (Left, 2017);  section (); pr insj non-tunneled central venous cath age 10 yr/> (N/A, 5/10/2018); pr arteriovenous anastomosis open direct (Left, 2018); pr vitrectomy pars plana remove preretinal membrane (Left, 2018);  Dialysis fistula creation (Left, 2019); and Rectal surgery (N/A, 1/29/2021). Social History:  reports that she has been smoking cigarettes. She has never used smokeless tobacco. She reports that she does not currently use alcohol. She reports that she does not use drugs. Family History: family history includes Cancer in her father; Diabetes in her paternal aunt; Stroke in her mother. The patients home medications have been reviewed. Allergies: Patient has no known allergies. ---------------------------------------------------PHYSICAL EXAM--------------------------------------    Constitutional/General: Alert and oriented x3, well appearing, non toxic in NAD  Head: Normocephalic and atraumatic  Eyes: PERRL, EOMI, conjunctive normal, sclera non icteric  Mouth: Oropharynx clear, handling secretions, no trismus, no asymmetry of the posterior oropharynx or uvular edema  Neck: Supple, full ROM, non tender to palpation in the midline, no stridor, no crepitus, no meningeal signs  Respiratory: Lungs clear to auscultation bilaterally, no wheezes, rales, or rhonchi. Not in respiratory distress  Cardiovascular:  Regular rate. Regular rhythm. No murmurs, gallops, or rubs. 2+ distal pulses  Chest: No chest wall tenderness  GI:  Abdomen Soft, Non tender, Non distended. +BS. No organomegaly, no palpable masses,  No rebound, guarding, or rigidity. Musculoskeletal: Moves all extremities x 4. Warm and well perfused, no clubbing, cyanosis, or edema. Capillary refill <3 seconds  Integument: skin warm and dry. No rashes. Lymphatic: no lymphadenopathy noted  Neurologic: GCS 15, no focal deficits, symmetric strength 5/5 in the upper and lower extremities bilaterally  Psychiatric: Normal Affect    -------------------------------------------------- RESULTS -------------------------------------------------  I have personally reviewed all laboratory and imaging results for this patient. Results are listed below.      LABS:  Results for orders placed or performed during the hospital encounter of 02/10/23   Respiratory Panel, Molecular, with COVID-19 (Restricted: peds pts or suitable admitted adults)    Specimen: Nasopharyngeal   Result Value Ref Range    Adenovirus by PCR Not Detected Not Detected    Bordetella parapertussis by PCR Not Detected Not Detected    Bordetella pertussis by PCR Not Detected Not Detected    Chlamydophilia pneumoniae by PCR Not Detected Not Detected    Coronavirus 229E by PCR Not Detected Not Detected    Coronavirus HKU1 by PCR Not Detected Not Detected    Coronavirus NL63 by PCR Not Detected Not Detected    Coronavirus OC43 by PCR Not Detected Not Detected    SARS-CoV-2, PCR Not Detected Not Detected    Human Metapneumovirus by PCR Not Detected Not Detected    Human Rhinovirus/Enterovirus by PCR Not Detected Not Detected    Influenza A by PCR Not Detected Not Detected    Influenza B by PCR Not Detected Not Detected    Mycoplasma pneumoniae by PCR Not Detected Not Detected    Parainfluenza Virus 1 by PCR Not Detected Not Detected    Parainfluenza Virus 2 by PCR Not Detected Not Detected    Parainfluenza Virus 3 by PCR Not Detected Not Detected    Parainfluenza Virus 4 by PCR Not Detected Not Detected    Respiratory Syncytial Virus by PCR Not Detected Not Detected   Troponin   Result Value Ref Range    Troponin, High Sensitivity 200 (H) 0 - 9 ng/L   Magnesium   Result Value Ref Range    Magnesium 3.0 (H) 1.6 - 2.6 mg/dL   Phosphorus   Result Value Ref Range    Phosphorus 10.6 (HH) 2.5 - 4.5 mg/dL   CBC   Result Value Ref Range    WBC 6.6 4.5 - 11.5 E9/L    RBC 2.78 (L) 3.50 - 5.50 E12/L    Hemoglobin 9.2 (L) 11.5 - 15.5 g/dL    Hematocrit 26.4 (L) 34.0 - 48.0 %    MCV 95.0 80.0 - 99.9 fL    MCH 33.1 26.0 - 35.0 pg    MCHC 34.8 (H) 32.0 - 34.5 %    RDW 16.1 (H) 11.5 - 15.0 fL    Platelets 112 012 - 790 E9/L    MPV 10.0 7.0 - 12.0 fL   Basic Metabolic Panel   Result Value Ref Range    Sodium 138 132 - 146 mmol/L    Potassium 5.2 (H) 3.5 - 5.0 mmol/L    Chloride 99 98 - 107 mmol/L    CO2 18 (L) 22 - 29 mmol/L    Anion Gap 21 (H) 7 - 16 mmol/L    Glucose 72 (L) 74 - 99 mg/dL    BUN 88 (H) 6 - 20 mg/dL    Creatinine 16.1 (HH) 0.5 - 1.0 mg/dL    Est, Glom Filt Rate 3 >=60 mL/min/1.73    Calcium 9.6 8.6 - 10.2 mg/dL   Lactic Acid   Result Value Ref Range    Lactic Acid 1.0 0.5 - 2.2 mmol/L   Procalcitonin   Result Value Ref Range    Procalcitonin 5.49 (H) 0.00 - 0.08 ng/mL       RADIOLOGY:  Interpreted by Radiologist.  XR CHEST PORTABLE   Final Result   Right lung airspace opacities. Correlate with pneumonia clinically. CTA PULMONARY W CONTRAST    (Results Pending)       EKG: This EKG is signed and interpreted by the EP. Time: 1603  Rate: 107  Rhythm: Sinus  Interpretation: sinus tachycardia  Comparison: stable as compared to patient's most recent EKG      ------------------------- NURSING NOTES AND VITALS REVIEWED ---------------------------   The nursing notes within the ED encounter and vital signs as below have been reviewed by myself. BP (!) 155/63   Pulse (!) 110   Temp 98.2 °F (36.8 °C) (Oral)   Resp 20   Ht 4' 11\" (1.499 m)   Wt 135 lb (61.2 kg)   SpO2 93%   BMI 27.27 kg/m²   Oxygen Saturation Interpretation: Normal    The patients available past medical records and past encounters were reviewed.         ------------------------------ ED COURSE/MEDICAL DECISION MAKING----------------------  Medications   vancomycin (VANCOCIN) 1,250 mg in sodium chloride 0.9 % 250 mL IVPB (has no administration in time range)   ipratropium-albuterol (DUONEB) nebulizer solution 1 ampule (1 ampule Inhalation Given 2/10/23 4236)   cefTRIAXone (ROCEPHIN) 2,000 mg in sterile water 20 mL IV syringe (2,000 mg IntraVENous Given 2/10/23 2212)   doxycycline hyclate (VIBRAMYCIN) capsule 100 mg (100 mg Oral Given 2/10/23 2210)             Medical Decision Makin female patient end-stage renal disease missed the last 2 dialysis sessions presents with acute shortness of breath found to be hypoxic in triage. 78% was placed on nonrebreather and brought back to treatment area. Patient states she has a mild cough. She reports no fevers no close contacts ill. Stat consultation was made with nephrology patient was taken to the dialysis treatment area for hemodialysis. Upon return she was found to be intermittently hypoxic requiring supplemental oxygen at 6 L nasal cannula. Chest x-ray demonstrated right lower lobe infiltrate. Concern for occult pneumonia. Patient was started on on Rocephin 2 g as well as 1250 mg of vancomycin. She received a DuoNeb treatment. Patient remained hypoxic after dialysis. Requiring supplemental oxygen. She was sent for a CTA of the chest to rule out a pulmonary embolism. History From: Patient    CC/HPI Summary, DDx, ED Course, Reassessment, Tests Considered, Patient expectation:   Differential diagnosis includes but not limited to volume overload from missed dialysis, community-acquired pneumonia, pulmonary embolism, viral pneumonia,    Was sent for hemodialysis stat. She reported back to the emergency department had intermittent hypoxia requiring supplemental oxygen. She was sent for CT scan of the chest.    Chest x-ray demonstrated right lobe infiltrate. Patient was started on antibiotics. Procalcitonin was ordered and it was greater than 5. Social Determinants affecting Dx or Tx: Patient noncompliant with her hemodialysis    Chronic Conditions: End-stage renal disease    Records Reviewed: Reviewed clinical documentation from January 2017 from Dr. Flash Montgomery. Patient's nephrologist.  For missed hemodialysis. I am the Primary Clinician of Record.     DISPOSITION: Admitted to telemetry    ED COURSE:       This patient's ED course included: a personal history and physicial examination, re-evaluation prior to disposition, multiple bedside re-evaluations, IV medications, cardiac monitoring, continuous pulse oximetry, and complex medical decision making and emergency management    This patient has remained hemodynamically stable, been closely monitored, and initially deteriorated, but then stabilized during their ED course. Re-Evaluations:             Re-evaluation. Patients symptoms are improving    Re-examination  2/10/23   7:19 PM EST    Consultations:             Dr. Ana Suh: none    Counseling: The emergency provider has spoken with the patient and discussed todays results, in addition to providing specific details for the plan of care and counseling regarding the diagnosis and prognosis. Questions are answered at this time and they are agreeable with the plan.       --------------------------------- IMPRESSION AND DISPOSITION ---------------------------------    IMPRESSION  1. Acute respiratory failure with hypoxia ([facility] [address]    2. ESR[facility] (end stage renal disease[facility]        DISPOSITION  Disposition: Admit to telemetry  Patient condition is serious    NOTE: This report was transcribed using voice recognition software.  Every effort was made to ensure accuracy; however, inadvertent computerized transcription errors may be present       Kinjal Galan DO  02/11/23 0037

## 2023-02-11 NOTE — ED NOTES
Radiology Procedure Waiver   Name: Josias Monsalve  : 1985  MRN: [id number]    Date:  23    Time: 12:25 AM EST    Benefits of immediately proceeding with Radiology exam(s) without pre-testing outweigh the risks or are not indicated as specified below and therefore the following is/are being waived:    [x] Pregnancy test   [x] Patients LMP on-time and regular.   [] Patient had Tubal Ligation or has other Contraception Device. [] Patient  is Menopausal or Premenarcheal.    [] Patient had Full or Partial Hysterectomy. [] Protocol for Iodine allergy    [] MRI Questionnaire     [x] BUN/Creatinine   [] Patient age w/no hx of renal dysfunction. [x] Patient on Dialysis. [] Recent Normal Labs.   Electronically signed by Nicolette Love DO on 23 at 12:25 AM EST              Nicolette Love DO  23 0025

## 2023-02-11 NOTE — CONSULTS
[facility]  Internal Medicine Residency Program  MICU Consult    Patient:  Chava Rivera 44 y.o. female MRN: [id number]     Date of Service: 2023    Hospital Day: 2      Chief complaint: hypotension    History Obtained From:  patient    History of Present Illness   Chava Rivera is a 44 y.o. female with PMH of ESRD since , Dmt1, initially presented to the ER due tpo shortness of breath after missing 2 sessions of HD. The work up in the ER showed bilateral pulmonary infiltrates 2/2 edema vs PNA. Procalcitonin was elevated at 5, pt was admitted, started on abx, HD sessions restated. RRT was called on Saturday due to hypotension after HD session, with removal of 3600 cc. BP was low 85/46, due to concerns for septic vs hypovolemic shock, pt was brought to MICU for further management. On arrival to the MICU, LA 3.7, pro calcitonin elevated to 8.29, CRP 6.7, Sed Rate 38.l pt got 1 L NS, BP medications put on hold, respiratory, blood cultures ordered, MRSA nares, ID was consulted and continued on azithromycin and cefepime, 2 doses of vancomycin were administered earlier      Previous Hospital Admission:22: HD    Past Medical History:       Diagnosis Date    JOLLY (acute kidney injury) ([address] 2016    AVF (arteriovenous fistula) ([address] 2018    let arm    Chronic kidney disease     Dialysis AV fistula malfunction, initial encounter ([address] 2019    DM type 1 (diabetes mellitus, type 1) ([address]     Encounter regarding vascular access for dialysis for ESRD ([address] 2018    ESRD (end stage renal disease) ([address]     Hemodialysis patient ([address]     amrita dawson  / graft in left arm    Hypertension     Tobacco abuse 2016       Past Surgical History:        Procedure Laterality Date     SECTION      CYST INCISION AND DRAINAGE  2011    perirectal abscess. [facility].  Dr. Jasvir Kramer Left 2019    LEFT ARM FISTULAGRAM, BALLOON ANGIOPLASTY performed by Raquel Aguero MD at [address] [facility]  2009    FRACTURE SURGERY  October 11,2012    fracture right ulnar, left tibia, and pelvis    OTHER SURGICAL HISTORY  1017/12    open reduction internal fixation left radial shaft    OTHER SURGICAL HISTORY  08/19/2016    pericardial window    OTHER SURGICAL HISTORY  08/23/2016     r tesio insertion  / remove 8/16/2018    OTHER SURGICAL HISTORY Left 02/17/2017    insertion a-v fistula left arm    [place]I ARTERIOVENOUS ANASTOMOSIS OPEN DIRECT Left 7/17/2018    REVISION AV FISTULA - LEFT ARM performed by Raquel Aguero MD at [address] NON-TUNNELED CENTRAL VENOUS CATH AGE 5 YR/> N/A 5/10/2018    TESIO CATHETER INSERTION performed by Ivon Carlos MD at [address] [facility] PRERETINAL MEMBRANE Left 8/28/2018    PARS PLANA VITRECTOMY 25 GAUGE, VITREOUS HEMORRHAGE REMOVAL, ENDO LASER, AIR/FLUID  EXCHANGE LEFT EYE performed by Jose Armando Perez MD at [address] [facility] N/A 1/29/2021    PERINEAL ABCESS INCISION AND DRAINAGE (LITHOTOMY) performed by Jenny Bishop MD at [address]       Medications Prior to Admission:    Prior to Admission medications    Medication Sig Start Date End Date Taking?  Authorizing Provider   Glucose (TRUEPLUS) 15 GM/32ML GEL Take 32 mLs by mouth daily as needed (hypoglycemia, BG <70) 2/7/23   Zeina Quesada, DO   famotidine (PEPCID) 20 MG tablet TAKE 1 TABLET BY MOUTH EVERY MORNING 1/20/23   May Waters, DO   ACETAMINOPHEN EXTRA STRENGTH 500 MG tablet TAKE 1 TABLET BY MOUTH EVERY 6 HOURS AS NEEDED FOR PAIN 1/20/23   Zeina Quesada, DO   Continuous Blood Gluc Transmit (DEXCOM G6 TRANSMITTER) MISC 1 each by Does not apply route every 3 months 1/20/23 4/20/23  Zeina Quesada, DO   insulin lispro, 1 Unit Dial, (HUMALOG/ADMELOG) 100 UNIT/ML SOPN Inject 10 Units into the skin 3 times daily (before meals) *Plus Sliding Scale* MAX 50 units a day 11/30/22   Shayla Dyson DO   brompheniramine-pseudoephedrine-DM (BROMFED DM) 2-30-10 MG/5ML syrup Take 10 mLs by mouth 4 times daily as needed for Congestion or Cough 10/28/22   Shayla Dyson DO   albuterol (PROVENTIL) (2.5 MG/3ML) 0.083% nebulizer solution Take 3 mLs by nebulization every 6 hours as needed for Wheezing or Shortness of Breath 3/15/22 11/30/22  Shayla Dyson, DO   clotrimazole-betamethasone (LOTRISONE) 1-0.05 % cream Apply topically 2 times daily. 3/15/22   Shayla Dyson, DO   bacitracin zinc 500 UNIT/GM ointment Apply topically daily Apply topically 2 times daily. 1/25/22   Shayla Dyson DO   Probiotic Product (PROBIOTIC ADVANCED PO) Take by mouth    Historical Provider, MD   Folic Acid 0.8 MG CAPS Take by mouth    Historical Provider, MD   buPROPion [facility] SR) 100 MG extended release tablet Take 1 tablet by mouth every 48 hours 9/21/21 3/20/22  Shayla Dyson, DO   Cholecalciferol (VITAMIN D3) 50 MCG (2000 UT) CAPS Take 1 capsule by mouth daily 9/10/21   Shayla Dyson, DO   albuterol sulfate  (90 Base) MCG/ACT inhaler Inhale 2 puffs into the lungs every 4-6 hours as needed for Wheezing or Shortness of Breath 8/17/21   KEMI Orr - CNP   hydrALAZINE (APRESOLINE) 50 MG tablet Take 1 tablet by mouth every 8 hours  Patient taking differently: Take 25 mg by mouth every 8 hours 1/30/21   Vaibhav Navarrete,    Insulin Glargine, 2 Unit Dial, (TOUJEO MAX SOLOSTAR) 300 UNIT/ML SOPN Inject 16 Units into the skin nightly     Historical Provider, MD   medroxyPROGESTERone (DEPO-PROVERA) 150 MG/ML injection Inject 150 mg into the muscle every 3 months  11/17/19   Historical Provider, MD   carvedilol (COREG) 25 MG tablet Take 50 mg by mouth every morning 2 tabs am and one tab pm 7/7/19   Historical Provider, MD       Allergies:  Patient has no known allergies.     Social History:   TOBACCO:   reports that she has been smoking cigarettes. She has never used smokeless tobacco.  ETOH:   reports that she does not currently use alcohol. OCCUPATION: house wife    Family History:       Problem Relation Age of Onset    Stroke Mother     Cancer Father     Diabetes Paternal Aunt        REVIEW OF SYSTEMS:    Review of Systems   Constitutional:  Negative for activity change. HENT:  Negative for congestion. Respiratory:  Positive for shortness of breath. Negative for cough, chest tightness and wheezing. Cardiovascular:  Negative for chest pain, palpitations and leg swelling. Gastrointestinal:  Negative for abdominal distention, abdominal pain, constipation, diarrhea, nausea and vomiting. Endocrine: Negative for cold intolerance and heat intolerance. Genitourinary:  Negative for difficulty urinating. Musculoskeletal:  Negative for arthralgias, back pain, gait problem and joint swelling. Skin:  Negative for color change. Neurological:  Negative for dizziness. Psychiatric/Behavioral:  Negative for agitation.         Physical Exam   VITAL SIGNS:  BP (!) 145/64   Pulse (!) 115   Temp 98.9 °F (37.2 °C) (Temporal)   Resp 10   Ht 4' 11\" (1.499 m)   Wt 145 lb 8.1 oz (66 kg)   SpO2 91%   BMI 29.39 kg/m²   Tmax over 24 hours:  Temp (24hrs), Av.2 °F (36.8 °C), Min:97.4 °F (36.3 °C), Max:99 °F (37.2 °C)      Patient Vitals for the past 6 hrs:   BP Temp Temp src Pulse Resp SpO2   23 2300 -- -- -- (!) 115 10 91 %   23 2200 (!) 145/64 -- -- (!) 107 18 --   23 2100 (!) 141/69 -- -- 98 10 --   23 2000 (!) 113/56 98.9 °F (37.2 °C) Temporal 97 13 (!) 88 %   23 1900 131/70 -- -- 99 24 99 %   23 1800 126/68 -- -- (!) 101 15 91 %         Intake/Output Summary (Last 24 hours) at 2023 2306  Last data filed at 2023 2302  Gross per 24 hour   Intake 780 ml   Output 3661 ml   Net -2881 ml     Wt Readings from Last 2 Encounters:   23 145 lb 8.1 oz (66 kg)   22 150 lb (68 kg) Body mass index is 29.39 kg/m². PHYSICAL EXAMINATION:  Physical Exam  Constitutional:       Appearance: Normal appearance. HENT:      Head: Normocephalic and atraumatic. Cardiovascular:      Rate and Rhythm: Normal rate and regular rhythm. Pulses: Normal pulses. Heart sounds: Normal heart sounds. Pulmonary:      Effort: Pulmonary effort is normal.      Breath sounds: Normal breath sounds. Abdominal:      General: Abdomen is flat. Bowel sounds are normal.      Palpations: Abdomen is soft. Musculoskeletal:         General: Normal range of motion. Cervical back: Normal range of motion. Skin:     General: Skin is warm and dry. Neurological:      General: No focal deficit present. Mental Status: She is alert and oriented to person, place, and time. Psychiatric:         Mood and Affect: Mood normal.         Behavior: Behavior normal.         Thought Content: Thought content normal.         Judgment: Judgment normal.         Any additional physical findings:    Lines     site day    Art line   None    TLC None    PICC None    Hemoaccess Fistula/Graft      VENT SETTINGS (Comprehensive) (if applicable): Additional Respiratory Assessments  Heart Rate: (!) 115  Resp: 10  SpO2: 91 %    ABGs:   No results for input(s): PH, PCO2, PO2, HCO3, BE, O2SAT in the last 72 hours.     Laboratory findings:  Complete Blood Count:   Recent Labs     02/11/23 0447 02/11/23  0645 02/11/23  1410   WBC 7.2 6.9 7.4   HGB 9.0* 8.9* 8.6*   HCT 26.2* 25.2* 25.5*    136 136        Last 3 Blood Glucose:   Recent Labs     02/11/23 0447 02/11/23  0645 02/11/23  1410   GLUCOSE 176* 194* 225*        PT/INR:    Lab Results   Component Value Date/Time    PROTIME 13.6 02/11/2023 02:24 PM    INR 1.2 02/11/2023 02:24 PM     PTT:    Lab Results   Component Value Date/Time    APTT 32.7 02/11/2023 02:24 PM       Comprehensive Metabolic Profile:   Recent Labs     02/11/23 0447 02/11/23  0645 02/11/23  1410  134 138   K 4.4 4.7 3.6   CL 95* 94* 96*   CO2 23 23 27   BUN 47* 49* 17   CREATININE 9.6* 9.7* 4.3*   GLUCOSE 176* 194* 225*   CALCIUM 9.3 9.3 8.8   PROT 7.6 7.5 6.7   LABALBU 3.7 3.6 3.3*   BILITOT 0.6 0.6 0.6   ALKPHOS 139* 135* 124*   AST 27 25 21   ALT 28 26 24      Magnesium:   Lab Results   Component Value Date/Time    MG 3.0 02/10/2023 05:50 PM     Phosphorus:   Lab Results   Component Value Date/Time    PHOS 10.6 02/10/2023 05:50 PM     Ionized Calcium: No results found for: CAION   Troponin: No results for input(s): TROPONINI in the last 72 hours. Urinalysis: Urine dipstick shows not done. Micro exam: not done. Imaging Studies        XR CHEST PORTABLE    Result Date: 2/10/2023  EXAMINATION: ONE XRAY VIEW OF THE CHEST 2/10/2023 4:11 pm COMPARISON: 07/28/2022 HISTORY: ORDERING SYSTEM PROVIDED HISTORY: chest pain TECHNOLOGIST PROVIDED HISTORY: Reason for exam:->chest pain What reading provider will be dictating this exam?->CRC FINDINGS: Right lung opacities. There is no effusion or pneumothorax. Stable heart size. The osseous structures are without acute process. Right lung airspace opacities. Correlate with pneumonia clinically. CTA PULMONARY W CONTRAST    Result Date: 2/11/2023  EXAMINATION: CTA OF THE CHEST 2/11/2023 1:26 am TECHNIQUE: CTA of the chest was performed after the administration of intravenous contrast.  Multiplanar reformatted images are provided for review. MIP images are provided for review. Automated exposure control, iterative reconstruction, and/or weight based adjustment of the mA/kV was utilized to reduce the radiation dose to as low as reasonably achievable. COMPARISON: 11/26/2018 HISTORY: ORDERING SYSTEM PROVIDED HISTORY: worsening SOB TECHNOLOGIST PROVIDED HISTORY: Reason for exam:->worsening SOB What reading provider will be dictating this exam?->CRC FINDINGS: Pulmonary Arteries: Pulmonary arteries are well opacified for evaluation.   No pulmonary emboli are seen. Mediastinum: Mild cardiomegaly. No thoracic aortic aneurysm is identified. No dissection. No great vessel stenosis. Mild mediastinal lymphadenopathy. No significant pericardial effusion. No focal esophageal thickening is identified. Lungs/pleura: There is a small right and trace left pleural effusion. Diffuse ground-glass opacities are seen throughout the lungs, with some interlobular smooth septal thickening as well as mild patchy alveolar consolidative changes. Upper Abdomen: No acute process seen in the upper abdomen. Fatty liver infiltration. Soft Tissues/Bones: No axillary or supraclavicular lymphadenopathy is identified. 1. Cardiomegaly with small right and trace left-sided pleural effusions, as well as diffuse ground-glass infiltrates and interlobular septal thickening felt to represent cardiogenic pulmonary edema. 2. Patchy alveolar consolidative changes are noted as well, which may also be related to pulmonary edema though can be seen with multilobar pneumonia. 3. Mild mediastinal reactive lymphadenopathy. EKG:   Sinus tachycardia  Possible Left atrial enlargement  Low voltage QRS  Borderline ECG  When compared with ECG of 28-JUL-2022 14:05,  No significant change was found    Resident's Assessment and Kevin Godinez is a 44 y.o. female came with   has a past medical history of JOLLY (acute kidney injury) ([address] AVF (arteriovenous fistula) ([address] Chronic kidney disease, Dialysis AV fistula malfunction, initial encounter ([address] DM type 1 (diabetes mellitus, type 1) ([address] Encounter regarding vascular access for dialysis for ESRD ([address] ESRD (end stage renal disease) ([address] Hemodialysis patient ([address] Hypertension, and Tobacco abuse.   CC of hypotension    Assessment and plan:    Neurology  Pt is alert and oriented after improvement of BP    Cardiovascular    Septic vs hypovolemic shock  BP 85/45 during RRT  HD session today with removal of 3600 cc fluid  Pro calcitonin trending up 5.48>>8.29  Put on hold BP medications today, will continue carvedilol in the morning tomorrow if BP tolerates  1L NS bolus given  Follow respiratory, blood culture, MRSA Nares  Abx: cefepime and azithromycin  Appreciate ID recommendations    2. HTN, on hydralazine q8 and carvedilol 50+25 a day. Currently on hold  will continue carvedilol in the morning tomorrow if BP tolerates  Will restart hydralazine if BP tolerates    Respiratory    Acute hypoxic respiratory insuficiency 2/2 HAP/CAP vs edema 2/2 missed HD, on 4 L NC  On 4 L NC, no signs of hypoxia  CTA:  1. Cardiomegaly with small right and trace left-sided pleural effusions, as   well as diffuse ground-glass infiltrates and interlobular septal thickening   felt to represent cardiogenic pulmonary edema. 2. Patchy alveolar consolidative changes are noted as well, which may also be   related to pulmonary edema though can be seen with multilobar pneumonia. 3. Mild mediastinal reactive lymphadenopathy. Pro calcitonin trending up 5.48>>8.29  Will continue abx regimen above  Follow respiratory, blood culture, MRSA Nares  Follow Legionella, pneumoniae ag  Respiratory panel negative  Abx: cefepime and azithromycin  Appreciate ID recommendations  Will continue scheduled HD    Endocrinology    Dmt1, on glargine 16 units at night, and 10 units = SS TID with meals  Continue 12 units glargine at night, 10 units Lispro 3 times daily with meals  POCT ac/hs  Follow HbA1c level    Nephrology    1. ESRD on HD  Has HD scheudled on T-TH-Sat, had HD session today will removal of 3600 cc fluid  Appreciate nephrology recommendations, will continue HD as scheduled    Vit D deficiency, follow vit D level        PT/OT: not indicated  DVT ppx: heparin 5000 TID  GI ppx: Pepcid    Next of Kin/ POA: none    Code Status: full         Rosa Saunders MD, PGY-1  Attending physician: Dr. Anna Marie Davis    NOTE: This report was transcribed using voice recognition software. Every effort was made to ensure accuracy; however, inadvertent computerized transcription errors may be present. [place]  Department of Pulmonary, Critical Care and Sleep Medicine  [address] [facility]  Department of Internal Medicine      During multidisciplinary team rounds Mike Hurt is a 44 y.o. female was seen, examined and discussed at the bedside. This is confirmation that I have personally seen and examined the patient and that the key elements of the encounter were outlined by me (> 85 % time). I personally reviewed medications & laboratory data. We if needed adjusted antibiotics and medications and correction are noted in the clinical pharmacy note. I presonally reviewed the days radiographic images and compared them to the previous images. I personally discussed the care with the consultants on Mike Hurt case. If present the family is updated. Critical Care time is 33 minutes.      Hypovolemic shock, Issue with dry weight,  Cushing disease    Umesh Oconnor DO, FACP, [facility],

## 2023-02-11 NOTE — PROGRESS NOTES
Patient called for help. Patient was sweating and feeling \"hot and miserable\". . BP 83/43. RR more than 40 and \"weak\". RRT called. 500 bolus fluid started. Labs sent. Inj. Steroid given. Patient shifted to ICU.

## 2023-02-12 ENCOUNTER — APPOINTMENT (OUTPATIENT)
Dept: GENERAL RADIOLOGY | Age: 38
DRG: 720 | End: 2023-02-12
Payer: MEDICAID

## 2023-02-12 LAB
ANION GAP SERPL CALCULATED.3IONS-SCNC: 16 MMOL/L (ref 7–16)
ANISOCYTOSIS: ABNORMAL
B.E.: 1.3 MMOL/L (ref -3–3)
BASOPHILS ABSOLUTE: 0 E9/L (ref 0–0.2)
BASOPHILS RELATIVE PERCENT: 0 % (ref 0–2)
BETA-HYDROXYBUTYRATE: 0.64 MMOL/L (ref 0.02–0.27)
BUN BLDV-MCNC: 35 MG/DL (ref 6–20)
CALCIUM SERPL-MCNC: 9.5 MG/DL (ref 8.6–10.2)
CHLORIDE BLD-SCNC: 90 MMOL/L (ref 98–107)
CO2: 25 MMOL/L (ref 22–29)
COHB: 1.5 % (ref 0–1.5)
CREAT SERPL-MCNC: 5.8 MG/DL (ref 0.5–1)
CRITICAL: ABNORMAL
DATE ANALYZED: ABNORMAL
DATE OF COLLECTION: ABNORMAL
EOSINOPHILS ABSOLUTE: 0 E9/L (ref 0.05–0.5)
EOSINOPHILS RELATIVE PERCENT: 0 % (ref 0–6)
GFR SERPL CREATININE-BSD FRML MDRD: 9 ML/MIN/1.73
GLUCOSE BLD-MCNC: 543 MG/DL (ref 74–99)
GLUCOSE BLD-MCNC: 733 MG/DL (ref 74–99)
HBA1C MFR BLD: 7.6 % (ref 4–5.6)
HCO3: 26.5 MMOL/L (ref 22–26)
HCT VFR BLD CALC: 25.8 % (ref 34–48)
HEMOGLOBIN: 8.9 G/DL (ref 11.5–15.5)
HHB: 15.1 % (ref 0–5)
HYPOCHROMIA: ABNORMAL
IMMATURE GRANULOCYTES #: 0.02 E9/L
IMMATURE GRANULOCYTES %: 0.3 % (ref 0–5)
LAB: ABNORMAL
LACTIC ACID: 1.1 MMOL/L (ref 0.5–2.2)
LACTIC ACID: 1.1 MMOL/L (ref 0.5–2.2)
LACTIC ACID: 1.5 MMOL/L (ref 0.5–2.2)
LYMPHOCYTES ABSOLUTE: 0.36 E9/L (ref 1.5–4)
LYMPHOCYTES RELATIVE PERCENT: 6 % (ref 20–42)
Lab: ABNORMAL
MCH RBC QN AUTO: 32.4 PG (ref 26–35)
MCHC RBC AUTO-ENTMCNC: 34.5 % (ref 32–34.5)
MCV RBC AUTO: 93.8 FL (ref 80–99.9)
METER GLUCOSE: 156 MG/DL (ref 74–99)
METER GLUCOSE: 370 MG/DL (ref 74–99)
METER GLUCOSE: 498 MG/DL (ref 74–99)
METER GLUCOSE: >500 MG/DL (ref 74–99)
METHB: 0.2 % (ref 0–1.5)
MODE: ABNORMAL
MONOCYTES ABSOLUTE: 0.27 E9/L (ref 0.1–0.95)
MONOCYTES RELATIVE PERCENT: 4.5 % (ref 2–12)
NEUTROPHILS ABSOLUTE: 5.34 E9/L (ref 1.8–7.3)
NEUTROPHILS RELATIVE PERCENT: 89.2 % (ref 43–80)
O2 CONTENT: 12 ML/DL
O2 SATURATION: 84.6 % (ref 92–98.5)
O2HB: 83.2 % (ref 94–97)
OPERATOR ID: 1868
OVALOCYTES: ABNORMAL
PATIENT TEMP: 37 C
PCO2: 44.5 MMHG (ref 35–45)
PDW BLD-RTO: 16.2 FL (ref 11.5–15)
PH BLOOD GAS: 7.39 (ref 7.35–7.45)
PHOSPHORUS: 6.3 MG/DL (ref 2.5–4.5)
PLATELET # BLD: 162 E9/L (ref 130–450)
PMV BLD AUTO: 10.3 FL (ref 7–12)
PO2: 53.7 MMHG (ref 75–100)
POIKILOCYTES: ABNORMAL
POLYCHROMASIA: ABNORMAL
POTASSIUM SERPL-SCNC: 5.3 MMOL/L (ref 3.5–5)
RBC # BLD: 2.75 E12/L (ref 3.5–5.5)
SODIUM BLD-SCNC: 131 MMOL/L (ref 132–146)
SOURCE, BLOOD GAS: ABNORMAL
TARGET CELLS: ABNORMAL
THB: 10.2 G/DL (ref 11.5–16.5)
TIME ANALYZED: 1309
VANCOMYCIN RANDOM: 14.4 MCG/ML (ref 5–40)
VITAMIN D 25-HYDROXY: 67 NG/ML (ref 30–100)
WBC # BLD: 6 E9/L (ref 4.5–11.5)

## 2023-02-12 PROCEDURE — 80048 BASIC METABOLIC PNL TOTAL CA: CPT

## 2023-02-12 PROCEDURE — 6370000000 HC RX 637 (ALT 250 FOR IP): Performed by: INTERNAL MEDICINE

## 2023-02-12 PROCEDURE — 6370000000 HC RX 637 (ALT 250 FOR IP): Performed by: FAMILY MEDICINE

## 2023-02-12 PROCEDURE — 6360000002 HC RX W HCPCS: Performed by: INTERNAL MEDICINE

## 2023-02-12 PROCEDURE — 6360000002 HC RX W HCPCS: Performed by: FAMILY MEDICINE

## 2023-02-12 PROCEDURE — 82947 ASSAY GLUCOSE BLOOD QUANT: CPT

## 2023-02-12 PROCEDURE — 2580000003 HC RX 258: Performed by: FAMILY MEDICINE

## 2023-02-12 PROCEDURE — 84100 ASSAY OF PHOSPHORUS: CPT

## 2023-02-12 PROCEDURE — 2580000003 HC RX 258: Performed by: INTERNAL MEDICINE

## 2023-02-12 PROCEDURE — 71045 X-RAY EXAM CHEST 1 VIEW: CPT

## 2023-02-12 PROCEDURE — 82010 KETONE BODYS QUAN: CPT

## 2023-02-12 PROCEDURE — 83036 HEMOGLOBIN GLYCOSYLATED A1C: CPT

## 2023-02-12 PROCEDURE — 82805 BLOOD GASES W/O2 SATURATION: CPT

## 2023-02-12 PROCEDURE — 2060000000 HC ICU INTERMEDIATE R&B

## 2023-02-12 PROCEDURE — 99232 SBSQ HOSP IP/OBS MODERATE 35: CPT | Performed by: INTERNAL MEDICINE

## 2023-02-12 PROCEDURE — 6370000000 HC RX 637 (ALT 250 FOR IP)

## 2023-02-12 PROCEDURE — 83605 ASSAY OF LACTIC ACID: CPT

## 2023-02-12 PROCEDURE — 85025 COMPLETE CBC W/AUTO DIFF WBC: CPT

## 2023-02-12 PROCEDURE — 82306 VITAMIN D 25 HYDROXY: CPT

## 2023-02-12 PROCEDURE — 80202 ASSAY OF VANCOMYCIN: CPT

## 2023-02-12 PROCEDURE — 82962 GLUCOSE BLOOD TEST: CPT

## 2023-02-12 PROCEDURE — S5553 INSULIN LONG ACTING 5 U: HCPCS | Performed by: FAMILY MEDICINE

## 2023-02-12 PROCEDURE — 36415 COLL VENOUS BLD VENIPUNCTURE: CPT

## 2023-02-12 RX ORDER — DIPHENHYDRAMINE HCL 25 MG
25 TABLET ORAL ONCE
Status: COMPLETED | OUTPATIENT
Start: 2023-02-12 | End: 2023-02-12

## 2023-02-12 RX ORDER — INSULIN LISPRO 100 [IU]/ML
0-4 INJECTION, SOLUTION INTRAVENOUS; SUBCUTANEOUS
Status: DISCONTINUED | OUTPATIENT
Start: 2023-02-12 | End: 2023-02-12

## 2023-02-12 RX ORDER — INSULIN LISPRO 100 [IU]/ML
0-8 INJECTION, SOLUTION INTRAVENOUS; SUBCUTANEOUS
Status: DISCONTINUED | OUTPATIENT
Start: 2023-02-12 | End: 2023-02-13

## 2023-02-12 RX ORDER — INSULIN LISPRO 100 [IU]/ML
0-4 INJECTION, SOLUTION INTRAVENOUS; SUBCUTANEOUS NIGHTLY
Status: DISCONTINUED | OUTPATIENT
Start: 2023-02-12 | End: 2023-02-12

## 2023-02-12 RX ORDER — CARVEDILOL 25 MG/1
25 TABLET ORAL EVERY EVENING
Status: DISCONTINUED | OUTPATIENT
Start: 2023-02-12 | End: 2023-02-13 | Stop reason: HOSPADM

## 2023-02-12 RX ORDER — INSULIN LISPRO 100 [IU]/ML
0-4 INJECTION, SOLUTION INTRAVENOUS; SUBCUTANEOUS NIGHTLY
Status: DISCONTINUED | OUTPATIENT
Start: 2023-02-12 | End: 2023-02-13

## 2023-02-12 RX ADMIN — INSULIN GLARGINE-YFGN 12 UNITS: 100 INJECTION, SOLUTION SUBCUTANEOUS at 20:09

## 2023-02-12 RX ADMIN — VANCOMYCIN HYDROCHLORIDE 500 MG: 500 INJECTION, POWDER, LYOPHILIZED, FOR SOLUTION INTRAVENOUS at 09:41

## 2023-02-12 RX ADMIN — DIPHENHYDRAMINE HYDROCHLORIDE 25 MG: 25 TABLET ORAL at 00:53

## 2023-02-12 RX ADMIN — INSULIN LISPRO 8 UNITS: 100 INJECTION, SOLUTION INTRAVENOUS; SUBCUTANEOUS at 18:09

## 2023-02-12 RX ADMIN — INSULIN LISPRO 4 UNITS: 100 INJECTION, SOLUTION INTRAVENOUS; SUBCUTANEOUS at 00:40

## 2023-02-12 RX ADMIN — CARVEDILOL 25 MG: 25 TABLET, FILM COATED ORAL at 00:40

## 2023-02-12 RX ADMIN — HEPARIN SODIUM 5000 UNITS: 10000 INJECTION INTRAVENOUS; SUBCUTANEOUS at 15:02

## 2023-02-12 RX ADMIN — FAMOTIDINE 20 MG: 20 TABLET, FILM COATED ORAL at 08:51

## 2023-02-12 RX ADMIN — CARVEDILOL 25 MG: 25 TABLET, FILM COATED ORAL at 18:09

## 2023-02-12 RX ADMIN — INSULIN LISPRO 10 UNITS: 100 INJECTION, SOLUTION INTRAVENOUS; SUBCUTANEOUS at 18:08

## 2023-02-12 RX ADMIN — SODIUM CHLORIDE, PRESERVATIVE FREE 10 ML: 5 INJECTION INTRAVENOUS at 20:10

## 2023-02-12 RX ADMIN — HEPARIN SODIUM 5000 UNITS: 10000 INJECTION INTRAVENOUS; SUBCUTANEOUS at 04:03

## 2023-02-12 RX ADMIN — CEFEPIME 1000 MG: 1 INJECTION, POWDER, FOR SOLUTION INTRAMUSCULAR; INTRAVENOUS at 13:55

## 2023-02-12 RX ADMIN — INSULIN LISPRO 8 UNITS: 100 INJECTION, SOLUTION INTRAVENOUS; SUBCUTANEOUS at 12:45

## 2023-02-12 RX ADMIN — SODIUM CHLORIDE, PRESERVATIVE FREE 10 ML: 5 INJECTION INTRAVENOUS at 08:52

## 2023-02-12 RX ADMIN — SODIUM ZIRCONIUM CYCLOSILICATE 10 G: 10 POWDER, FOR SUSPENSION ORAL at 12:52

## 2023-02-12 RX ADMIN — INSULIN LISPRO 8 UNITS: 100 INJECTION, SOLUTION INTRAVENOUS; SUBCUTANEOUS at 06:14

## 2023-02-12 RX ADMIN — AZITHROMYCIN 500 MG: 500 INJECTION, POWDER, LYOPHILIZED, FOR SOLUTION INTRAVENOUS at 12:48

## 2023-02-12 RX ADMIN — INSULIN LISPRO 10 UNITS: 100 INJECTION, SOLUTION INTRAVENOUS; SUBCUTANEOUS at 05:06

## 2023-02-12 RX ADMIN — CARVEDILOL 50 MG: 25 TABLET, FILM COATED ORAL at 08:51

## 2023-02-12 RX ADMIN — INSULIN LISPRO 10 UNITS: 100 INJECTION, SOLUTION INTRAVENOUS; SUBCUTANEOUS at 12:45

## 2023-02-12 RX ADMIN — HEPARIN SODIUM 5000 UNITS: 10000 INJECTION INTRAVENOUS; SUBCUTANEOUS at 20:10

## 2023-02-12 RX ADMIN — Medication 2000 UNITS: at 08:51

## 2023-02-12 NOTE — PLAN OF CARE
Problem: Discharge Planning  Goal: Discharge to home or other facility with appropriate resources  2/11/2023 2039 by Susa Claude, RN  Outcome: Progressing  2/11/2023 0740 by Cheryl Tinoco RN  Outcome: Progressing     Problem: Safety - Adult  Goal: Free from fall injury  2/11/2023 2039 by Susa Claude, RN  Outcome: Progressing  2/11/2023 0740 by Cheryl Tinoco RN  Outcome: Progressing     Problem: Skin/Tissue Integrity  Goal: Absence of new skin breakdown  Description: 1. Monitor for areas of redness and/or skin breakdown  2. Assess vascular access sites hourly  3. Every 4-6 hours minimum:  Change oxygen saturation probe site  4. Every 4-6 hours:  If on nasal continuous positive airway pressure, respiratory therapy assess nares and determine need for appliance change or resting period.   2/11/2023 2039 by Susa Claude, RN  Outcome: Progressing  2/11/2023 0740 by Cheryl Tinoco RN  Outcome: Progressing     Problem: Chronic Conditions and Co-morbidities  Goal: Patient's chronic conditions and co-morbidity symptoms are monitored and maintained or improved  Outcome: Progressing

## 2023-02-12 NOTE — PROGRESS NOTES
Patient is scheduled for a URONAV BX with Dr. Mtz April on 7/19/2022. We are requesting clearance and direction on Plavix from Dr. Christina Curtis. Thank you. Perfectserved Dr. Toni Sarah for blood sugar 418 with no insulin ordered. Patient takes 50mg of coreg in AM and 25mg at night but night dose is not ordered.

## 2023-02-12 NOTE — PROGRESS NOTES
[address] [place] [address] [facility]  Progress Note      Chief Complaint   Patient presents with    Shortness of Breath     Hasn't had dialysis since Saturday/Denies Chest pain       SUBJECTIVE:  18-year-old female with past medical history of ESRD on HD via AV fistula, DM, HTN, chronic tobacco abuser presented with shortness of breath. She missed to recent dialysis sessions. She was found to be tachypneic and tachycardic. She was started on 5 L nasal cannula. CTA chest showed bilateral patchy opacities consistent with pulmonary edema versus bilateral multifocal pneumonia. Elevated procalcitonin noted. Patient is currently being treated with cefepime, azithromycin and vancomycin. Patient was found to be hypotensive after hemodialysis session today. RRT was called and patient was transferred to MICU. ID is following for sepsis due to pneumonia. Patient is tolerating medications. No reported adverse drug reactions. No nausea, vomiting, diarrhea. Review of systems:  As stated above in the chief complaint, otherwise negative.     Medications:  Scheduled Meds:   carvedilol  25 mg Oral QPM    insulin lispro  0-8 Units SubCUTAneous TID WC    insulin lispro  0-4 Units SubCUTAneous Nightly    carvedilol  50 mg Oral QAM    vitamin D  2,000 Units Oral Daily    famotidine  20 mg Oral QAM    [Held by provider] hydrALAZINE  25 mg Oral 3 times per day    insulin glargine-yfgn  12 Units SubCUTAneous Nightly    insulin lispro  10 Units SubCUTAneous TID AC    sodium chloride flush  10 mL IntraVENous 2 times per day    heparin (porcine)  5,000 Units SubCUTAneous 3 times per day    vancomycin (VANCOCIN) intermittent dosing (placeholder)   Other RX Placeholder    azithromycin  500 mg IntraVENous Q24H    cefepime  1,000 mg IntraVENous Q24H     Continuous Infusions:   sodium chloride      dextrose       PRN Meds:albuterol, sodium chloride flush, sodium chloride, promethazine **OR** ondansetron, polyethylene glycol, acetaminophen **OR** acetaminophen, glucose, dextrose bolus **OR** dextrose bolus, glucagon (rDNA), dextrose, labetalol, hydrALAZINE    OBJECTIVE:  /62   Pulse 88   Temp 98.7 °F (37.1 °C) (Temporal)   Resp 15   Ht 4' 11\" (1.499 m)   Wt 145 lb 8.1 oz (66 kg)   SpO2 97%   BMI 29.39 kg/m²   Temp  Av.6 °F (37 °C)  Min: 98 °F (36.7 °C)  Max: 99 °F (37.2 °C)  Constitutional: The patient is awake, alert, and oriented. Skin: Warm and dry. No rashes were noted. No jaundice. HEENT: Eyes show round, and reactive pupils. Moist mucous membranes, no ulcerations, no thrush. Neck: Supple to movements. No lymphadenopathy. Chest: Bilateral crackles heard. Cardiovascular: S1 and S2 are rhythmic and regular. No murmurs appreciated. Abdomen: Positive bowel sounds to auscultation. Benign to palpation. No masses felt. No hepatosplenomegaly. Genitourinary: Deferred  Extremities: No clubbing, no cyanosis, no edema. Musculoskeletal: No pain in range of motion of any joints  Neurological: Following commands, no focal neurodeficit.   Lines: peripheral    Laboratory and Tests Review:  Lab Results   Component Value Date    WBC 6.0 2023    WBC 7.4 2023    WBC 6.9 2023    HGB 8.9 (L) 2023    HCT 25.8 (L) 2023    MCV 93.8 2023     2023     Lab Results   Component Value Date    NEUTROABS 5.34 2023    NEUTROABS 5.80 2023    NEUTROABS 5.17 2023     No results found for: [facility]  Lab Results   Component Value Date    ALT 24 2023    AST 21 2023    ALKPHOS 124 (H) 2023    BILITOT 0.6 2023     Lab Results   Component Value Date/Time     2023 05:10 AM    K 5.3 2023 05:10 AM    K 4.4 2023 04:47 AM    CL 90 2023 05:10 AM    CO2 25 2023 05:10 AM    BUN 35 2023 05:10 AM    CREATININE 5.8 2023 05:10 AM    CREATININE 4.3 2023 02:10 PM    CREATININE 9.7 2023 06:45 AM    GFRAA 5 07/29/2022 01:14 AM    LABGLOM 9 02/12/2023 05:10 AM    GLUCOSE 543 02/12/2023 11:36 AM    GLUCOSE 279 07/27/2011 04:40 PM    PROT 6.7 02/11/2023 02:10 PM    LABALBU 3.3 02/11/2023 02:10 PM    LABALBU 3.9 01/10/2011 01:40 PM    CALCIUM 9.5 02/12/2023 05:10 AM    BILITOT 0.6 02/11/2023 02:10 PM    ALKPHOS 124 02/11/2023 02:10 PM    AST 21 02/11/2023 02:10 PM    ALT 24 02/11/2023 02:10 PM     Lab Results   Component Value Date    CRP 6.7 (H) 02/11/2023    CRP 0.9 (H) 03/18/2022    CRP 0.1 11/05/2019     Lab Results   Component Value Date    SEDRATE 38 (H) 02/11/2023    SEDRATE 29 (H) 03/18/2022    SEDRATE 83 (H) 11/28/2018     Radiology:      Microbiology:   Lab Results   Component Value Date/Time    BC 24 Hours no growth 02/10/2023 09:58 PM    BC 5 Days no growth 03/17/2022 12:00 PM    BC 5 Days no growth 05/19/2021 01:26 PM    ORG Anaerobic gram negative sharon 05/19/2021 06:21 PM    ORG Staphylococcus epidermidis 05/19/2021 06:21 PM    ORG Corynebacterium species 05/19/2021 06:21 PM     Lab Results   Component Value Date/Time    BLOODCULT2 24 Hours no growth 02/10/2023 09:58 PM    BLOODCULT2 5 Days no growth 03/17/2022 12:00 PM    BLOODCULT2 5 Days no growth 05/19/2021 06:24 PM    ORG Anaerobic gram negative sharon 05/19/2021 06:21 PM    ORG Staphylococcus epidermidis 05/19/2021 06:21 PM    ORG Corynebacterium species 05/19/2021 06:21 PM     WOUND/ABSCESS   Date Value Ref Range Status   05/19/2021 Moderate growth  Final   05/19/2021 Light growth  Final   01/27/2021 Heavy growth  Final   01/27/2021 Moderate growth  Final     Smear, Respiratory   Date Value Ref Range Status   11/26/2018   Final    Group 5: >25 PMN's/LPF and <10 Epithelial cells/LPF  Abundant Polymorphonuclear leukocytes  Few Epithelial cells  Few gram positive rods Diphtheroid-like  Rare Gram negative rods  Few Gram positive diplococci  Few Gram positive cocci in clusters           Component Value Date/Time    LABFUNG 4 Weeks- no fungus isolated 08/19/2016 1735     CULTURE, RESPIRATORY   Date Value Ref Range Status   11/26/2018 Oral Pharyngeal Judy present  Final     No results found for: CXCATHTIP  Body Fluid Culture, Sterile   Date Value Ref Range Status   08/19/2016 Growth not present  Final     Culture Surgical   Date Value Ref Range Status   01/29/2021 Light growth  Final   01/29/2021 Moderate growth  (second morphology)    Final   01/29/2021 Moderate growth  (third morphology)    Final     Urine Culture, Routine   Date Value Ref Range Status   01/27/2017   Final    <10,000 CFU/mL  Gram negative rods  Gram positive cocci     08/16/2016   Final    ,000 CFU/mL  Mixed judy isolated. Further workup and sensitivity testing  is not routinely indicated and will not be performed. Mixed judy isolated includes:  Mixed gram positive organisms       MRSA Culture Only   Date Value Ref Range Status   11/26/2018 Methicillin resistant Staph aureus not isolated  Final   09/01/2016 Methicillin resistant Staph aureus not isolated  Final       Assessment:  Sepsis due to bilateral multifocal pneumonia  Bilateral patchy opacities in the CTA chest consistent with bilateral multifocal pneumonia versus pulmonary edema. ESRD on HD  Acute hypoxic respiratory failure     Plan:    Continue cefepime, azithromycin and vancomycin. Follow blood and respiratory cultures. Patient continues to be on 2 L nasal cannula and hemodynamically stable in the ICU. Patient is being transferred to the MICU for borderline hypotension after hemodialysis. Next hemodialysis planned on Monday. Rest of the sepsis management as per MICU team.  We will continue to follow.     Anca Marquis MD  1:49 PM  2/12/2023

## 2023-02-12 NOTE — PROGRESS NOTES
Nephrology Progress Note  Patient's Name: Davidson Gallegos  8:23 AM  2/12/2023        Reason for Consult: ESRD HD dependent  Requesting Physician:  Malena Park DO    Chief Complaint: Shortness of breath; missed HD treatment for 1 week  History Obtained From:  patient and EHR    History of Present Ilness:    Davidson Gallegos is a 44 y.o. female with a history of ESRD HD dependent, hypertension, type I DM. She presented to ED with complaints of increasing generalized edema including facial edema. This was associated with shortness of breath. She denies any chest pain. No cough. No fever or chills. Patient reports that she was unable to secure care for her special needs child. As a result she could not make her dialysis appointments. On arrival to ED vitals showed blood pressure 164/112, pulse 113, respirations 22. Temperature 97.5. Laboratory data showed high-sensitivity troponin 200, magnesium 3, phosphorus 10.6, WBC 6.6, hemoglobin 9.2, platelet count 195 K, potassium 5.2, CO2 18, BUN 88 and creatinine 16. Procalcitonin was 5.  4 9 chest x-ray showed right lung air space opacities viral respiratory panel was negative. CTA of the chest showed findings of pulmonary edema with question of multilobar pneumonia. There was no evidence of pulmonary embolism. Patient received vancomycin and ceftriaxone. She was subsequently taken to the hemodialysis suite for treatment. To be followed by admission to telemetry. Subjective:    2/11: Seen during hemodialysis; she states her breathing is much improved; she is having some cramping as a result of attempts to remove fluid    2/12: Tachycardia hypotension post dialysis yesterday associated with shortness of breath; transferred to MICU improved with fluid bolus; no shortness of breath this a.m. Allergies:  Patient has no known allergies.     Current Medications:    carvedilol (COREG) tablet 25 mg, QPM  insulin lispro (HUMALOG) injection vial 0-8 Units, TID WC  insulin lispro (HUMALOG) injection vial 0-4 Units, Nightly  vancomycin (VANCOCIN) 500 mg in sodium chloride 0.9 % 100 mL IVPB, Once  albuterol (PROVENTIL) nebulizer solution 2.5 mg, Q6H PRN  carvedilol (COREG) tablet 50 mg, QAM  vitamin D (CHOLECALCIFEROL) tablet 2,000 Units, Daily  famotidine (PEPCID) tablet 20 mg, QAM  [Held by provider] hydrALAZINE (APRESOLINE) tablet 25 mg, 3 times per day  insulin glargine-yfgn (SEMGLEE-YFGN) injection vial 12 Units, Nightly  insulin lispro (HUMALOG) injection vial 10 Units, TID AC  sodium chloride flush 0.9 % injection 10 mL, 2 times per day  sodium chloride flush 0.9 % injection 10 mL, PRN  0.9 % sodium chloride infusion, PRN  heparin (porcine) injection 5,000 Units, 3 times per day  promethazine (PHENERGAN) tablet 12.5 mg, Q6H PRN   Or  ondansetron (ZOFRAN) injection 4 mg, Q6H PRN  polyethylene glycol (GLYCOLAX) packet 17 g, Daily PRN  acetaminophen (TYLENOL) tablet 650 mg, Q6H PRN   Or  acetaminophen (TYLENOL) suppository 650 mg, Q6H PRN  glucose chewable tablet 16 g, PRN  dextrose bolus 10% 125 mL, PRN   Or  dextrose bolus 10% 250 mL, PRN  glucagon injection 1 mg, PRN  dextrose 10 % infusion, Continuous PRN  vancomycin (VANCOCIN) intermittent dosing (placeholder), RX Placeholder  azithromycin (ZITHROMAX) 500 mg in sodium chloride 0.9 % 250 mL IVPB ([id number]), Q24H  cefepime (MAXIPIME) 1,000 mg in sodium chloride 0.9 % 50 mL IVPB ([id number]), Q24H  labetalol (NORMODYNE;TRANDATE) injection 10 mg, Q4H PRN  hydrALAZINE (APRESOLINE) injection 10 mg, Q4H PRN      Review of Systems:   Pertinent items are noted in HPI. Physical exam:  Vitals:    02/12/23 0700   BP: (!) 144/58   Pulse: 96   Resp: 28   Temp:    SpO2: 100%           General: No distress. Alert. Eyes: PERRL. No sclera icterus. No conjunctival injectio; periorbital edema and facial puffiness  ENT: No discharge. Pharynx clear. Neck: Trachea midline. Normal thyroid.   Lungs: No accessory muscle use. No crackles. No wheezing. No rhonchi. CV: Regular rate. Regular rhythm. No murmur or rub. .   Abd: Non-tender. Non-distended. No masses. No organmegaly. Normal bowel sounds. Skin: Warm and dry. No nodule on exposed extremities. No rash on exposed extremities. Ext: No cyanosis, clubbing, 2+ pitting bilateral legs edema; LFA AVF cannulated  Neuro: Awake. Follows commands. Positive pupils/gag/corneals. Normal pain response. Data:   Labs:  Lab Results   Component Value Date     (L) 02/12/2023     02/11/2023     02/11/2023    K 5.3 (H) 02/12/2023    K 3.6 02/11/2023    K 4.7 02/11/2023    CL 90 (L) 02/12/2023    CO2 25 02/12/2023    CO2 27 02/11/2023    CO2 23 02/11/2023    CREATININE 5.8 (H) 02/12/2023    CREATININE 4.3 (H) 02/11/2023    CREATININE 9.7 (HH) 02/11/2023    BUN 35 (H) 02/12/2023    BUN 17 02/11/2023    BUN 49 (H) 02/11/2023    GLUCOSE 733 (HH) 02/12/2023    GLUCOSE 225 (H) 02/11/2023    GLUCOSE 194 (H) 02/11/2023    PHOS 6.3 (H) 02/12/2023    PHOS 10.6 (HH) 02/10/2023    PHOS 6.6 (H) 02/01/2021    WBC 6.0 02/12/2023    WBC 7.4 02/11/2023    WBC 6.9 02/11/2023    HGB 8.9 (L) 02/12/2023    HGB 8.6 (L) 02/11/2023    HGB 8.9 (L) 02/11/2023    HCT 25.8 (L) 02/12/2023    HCT 25.5 (L) 02/11/2023    HCT 25.2 (L) 02/11/2023    MCV 93.8 02/12/2023     02/12/2023         Imaging:  XR CHEST PORTABLE    Result Date: 2/10/2023  EXAMINATION: ONE XRAY VIEW OF THE CHEST 2/10/2023 4:11 pm COMPARISON: 07/28/2022 HISTORY: ORDERING SYSTEM PROVIDED HISTORY: chest pain TECHNOLOGIST PROVIDED HISTORY: Reason for exam:->chest pain What reading provider will be dictating this exam?->CRC FINDINGS: Right lung opacities. There is no effusion or pneumothorax. Stable heart size. The osseous structures are without acute process. Right lung airspace opacities. Correlate with pneumonia clinically. Assessment/Plans    1.   Acute pulmonary edema due to missed hemodialysis treatments  Hemodialysis  Fluid restriction  Mild hyperkalemia  will give dose of Lokelma    2. Hypertensive urgency  Suspect due to missed doses of BP meds  BP now trending low  Adjust meds    3. Acute respiratory failure due to acute pulmonary edema  Supplemental oxygen  Improved with dialysis    4. Severe hyperphosphatemia  Question if she is taking any phosphate binders  will begin phosphate binder  Monitor levels    5. ESRD HD dependent  Missed 3 last HD treatments due to problems with childcare  Hemodialysis  Fluid restriction  SW at patient's dialysis facility to follow-up on this issue    6. Possible community-acquired pneumonia  Received Vanc  and doxycycline    7.   Uncontrolled diabetes mellitus   this a.m. no acidosis however      D/w Resident    Aishwarya Schmitz MD  8:23 AM  2/12/2023

## 2023-02-12 NOTE — PROGRESS NOTES
Hospitalist Progress Note      SYNOPSIS: Patient admitted on 2/10/2023 for shortness of breath. Patient presented to the emergency department with shortness of breath. Patient has a history of end-stage renal disease on hemodialysis. Has missed the past 2 dialysis sessions. Patient reports worsening of shortness of breath with exertion. Patient reports a dry nonproductive cough. Vital signs show the patient to be tachycardic with a rate of 110. On arrival SPO2 was 78% on room air. Currently she is 93% on 2 L nasal cannula. The patient is afebrile. Laboratory studies demonstrate potassium 5.2, BUN 88, creatinine 16.1, anion gap 21, magnesium 3.0, glucose 72, phosphorus 10.6, hemoglobin 9.2. Chest x-ray shows right lung airspace opacities. Patient was started on empiric antibiotics for healthcare associated pneumonia. Patient received dialysis on 2/10 and  inpatient. Patient transferred to ICU on  for low blood pressure. Her medications were adjusted and patient remained stable. SUBJECTIVE:    Patient seen and examined in the ICU. Alert awake oriented x3 and appears comfortable. Denies any chest pain. Shortness of breath improved. Records reviewed. Stable overnight. No other overnight issues reported. Temp (24hrs), Av.6 °F (37 °C), Min:98 °F (36.7 °C), Max:99 °F (37.2 °C)    DIET: ADULT DIET; Regular; 4 carb choices (60 gm/meal); Low Potassium (Less than 3000 mg/day); Low Phosphorus (Less than 1000 mg)  CODE: Full Code    Intake/Output Summary (Last 24 hours) at 2023 0748  Last data filed at 2023 0616  Gross per 24 hour   Intake 780 ml   Output 3661 ml   Net -2881 ml       OBJECTIVE:    BP (!) 144/58   Pulse 96   Temp 98.9 °F (37.2 °C) (Temporal)   Resp 28   Ht 4' 11\" (1.499 m)   Wt 145 lb 8.1 oz (66 kg)   SpO2 100%   BMI 29.39 kg/m²     General appearance: No apparent distress, appears stated age and cooperative. HEENT:  Conjunctivae/corneas clear. Neck: Supple. No jugular venous distention. Respiratory: Clear to auscultation bilaterally, normal respiratory effort  Cardiovascular: Regular rate rhythm, normal S1-S2  Abdomen: Soft, nontender, nondistended  Musculoskeletal: No clubbing, cyanosis, no bilateral lower extremity edema. Brisk capillary refill. Skin:  No rashes  on visible skin  Neurologic: awake, alert and following commands       ASSESSMENT:  -Acute respiratory failure with hypoxia  -Possible healthcare associated pneumonia  -Hypomagnesemia  -Chronic anemia  -ESRD on hemodialysis  -Missed dialysis  -Sinus tachycardia  -Diabetes mellitus  -Hypertension        PLAN:  -Admit to medicine  -Consult nephrology  -Supplemental O2 as needed  -Continue vancomycin, ceftriaxone and doxycycline, consult ID for further evaluation management  ID recommended continuing cefepime, azithromycin, vancomycin, waiting for blood cultures  -Check procalcitonin significantly elevated  -Hemodialysis per nephrology guidance  -Monitor serum electrolytes  -Telemetry  -Respiratory cultures  -Continue home medications    DISPOSITION:   Unclear discharge disposition, ongoing treatment for fluid overload as well as pneumonia.     Medications:  REVIEWED DAILY    Infusion Medications    sodium chloride      dextrose       Scheduled Medications    carvedilol  25 mg Oral QPM    insulin lispro  0-8 Units SubCUTAneous TID WC    insulin lispro  0-4 Units SubCUTAneous Nightly    carvedilol  50 mg Oral QAM    vitamin D  2,000 Units Oral Daily    famotidine  20 mg Oral QAM    [Held by provider] hydrALAZINE  25 mg Oral 3 times per day    insulin glargine-yfgn  12 Units SubCUTAneous Nightly    insulin lispro  10 Units SubCUTAneous TID AC    sodium chloride flush  10 mL IntraVENous 2 times per day    heparin (porcine)  5,000 Units SubCUTAneous 3 times per day    vancomycin (VANCOCIN) intermittent dosing (placeholder)   Other RX Placeholder    azithromycin  500 mg IntraVENous Q24H cefepime  1,000 mg IntraVENous Q24H     PRN Meds: albuterol, sodium chloride flush, sodium chloride, promethazine **OR** ondansetron, polyethylene glycol, acetaminophen **OR** acetaminophen, glucose, dextrose bolus **OR** dextrose bolus, glucagon (rDNA), dextrose, labetalol, hydrALAZINE    Labs:     Recent Labs     02/11/23  0645 02/11/23  1410 02/12/23  0510   WBC 6.9 7.4 6.0   HGB 8.9* 8.6* 8.9*   HCT 25.2* 25.5* 25.8*    136 162       Recent Labs     02/10/23  1750 02/11/23  0447 02/11/23  0645 02/11/23  1410 02/12/23  0510      < > 134 138 131*   K 5.2*   < > 4.7 3.6 5.3*   CL 99   < > 94* 96* 90*   CO2 18*   < > 23 27 25   BUN 88*   < > 49* 17 35*   CREATININE 16.1*   < > 9.7* 4.3* 5.8*   CALCIUM 9.6   < > 9.3 8.8 9.5   PHOS 10.6*  --   --   --  6.3*    < > = values in this interval not displayed. Recent Labs     02/11/23  0447 02/11/23  0645 02/11/23  1410   PROT 7.6 7.5 6.7   ALKPHOS 139* 135* 124*   ALT 28 26 24   AST 27 25 21   BILITOT 0.6 0.6 0.6       Recent Labs     02/11/23  1424   INR 1.2       No results for input(s): Evelyn Ngo in the last 72 hours. Chronic labs:    Lab Results   Component Value Date    CHOL 155 10/08/2013    TRIG 68 10/08/2013    HDL 40 01/29/2021    LDLCALC 41 01/29/2021    TSH 3.850 01/27/2021    INR 1.2 02/11/2023    LABA1C 7.6 (H) 02/12/2023       Radiology: REVIEWED DAILY    +++++++++++++++++++++++++++++++++++++++++++++++++  Mary Agustin MD  [facility] [place] Physician - [address] [place] [address]  +++++++++++++++++++++++++++++++++++++++++++++++++  NOTE: This report was transcribed using voice recognition software. Every effort was made to ensure accuracy; however, inadvertent computerized transcription errors may be present.

## 2023-02-12 NOTE — PROGRESS NOTES
Patient noted Dexcom sensor malfunctioning and inquired about replacement. [facility] not [facility] Dexcom sensor. Informed patient of this.

## 2023-02-12 NOTE — PLAN OF CARE
Notified by nursing that patient has BS of 418 and no insulin is ordered for the patient. Nursing also states that patient takes 50 mg of Coreg in the morning and 25 mg at night at home. Only morning dose was ordered. Vitals assessed for patient. She is tachycardic and hypertensive, previously controlled during the day. Coreg 25 mg nightly ordered. LDSS added for patient. Will continue to monitor blood glucose.

## 2023-02-12 NOTE — PROGRESS NOTES
[address] [facility]  Department of Internal Medicine   Internal Medicine Residency   MICU Progress Note    Patient:  Tresia Lesch 44 y.o. female  MRN: [id number]     Date of Service: 2023    Allergy: Patient has no known allergies. Subjective   Overnight BP stable, BG elevated in settings of steroids  Respiratory function stable  Objective     VITAL SIGNS:  /66   Pulse 90   Temp 98.7 °F (37.1 °C) (Temporal)   Resp 17   Ht 4' 11\" (1.499 m)   Wt 145 lb 8.1 oz (66 kg)   SpO2 (!) 77%   BMI 29.39 kg/m²   Tmax over 24 hours:  Temp (24hrs), Av.7 °F (37.1 °C), Min:98 °F (36.7 °C), Max:99 °F (37.2 °C)      Patient Vitals for the past 6 hrs:   BP Temp Temp src Pulse Resp SpO2   23 1500 122/66 -- -- 90 17 (!) 77 %   23 1400 123/64 -- -- 94 21 97 %   23 1300 125/61 -- -- 91 13 100 %   23 1200 133/62 98.7 °F (37.1 °C) Temporal 88 15 97 %   23 1100 (!) 149/72 -- -- 85 15 96 %   23 1000 (!) 129/53 -- -- 87 19 95 %         Intake/Output Summary (Last 24 hours) at 2023 1555  Last data filed at 2023 1000  Gross per 24 hour   Intake 720 ml   Output 2 ml   Net 718 ml     Wt Readings from Last 2 Encounters:   23 145 lb 8.1 oz (66 kg)   22 150 lb (68 kg)     Body mass index is 29.39 kg/m².         I & O - 24hr:   Intake/Output Summary (Last 24 hours) at 2023 1555  Last data filed at 2023 1000  Gross per 24 hour   Intake 720 ml   Output 2 ml   Net 718 ml       Physical Exam:  General Appearance: alert, appears stated age, and cooperative  Neck: no adenopathy, no carotid bruit, no JVD, supple, symmetrical, trachea midline, and thyroid not enlarged, symmetric, no tenderness/mass/nodules  Lung: clear to auscultation bilaterally  Heart: regular rate and rhythm, S1, S2 normal, no murmur, click, rub or gallop  Abdomen: soft, non-tender; bowel sounds normal; no masses,  no organomegaly  Extremities:  extremities normal, atraumatic, no cyanosis or edema  Musculoskeletal: No joint swelling, no muscle tenderness. ROM normal in all joints of extremities. Neurologic: Mental status: Alert, oriented, thought content appropriate    Lines     site day    Art line   None    TLC None    PICC None    Hemoaccess Fistula/Graft      VENT SETTINGS (Comprehensive) (if applicable): Additional Respiratory Assessments  Heart Rate: 90  Resp: 17  SpO2: (!) 77 %    ABGs:   Recent Labs     02/12/23  1309   PH 7.393   PCO2 44.5   PO2 53.7*   HCO3 26.5*   BE 1.3   O2SAT 84.6*       Laboratory findings:  Complete Blood Count:   Recent Labs     02/11/23  0645 02/11/23  1410 02/12/23  0510   WBC 6.9 7.4 6.0   HGB 8.9* 8.6* 8.9*   HCT 25.2* 25.5* 25.8*    136 162        Last 3 Blood Glucose:   Recent Labs     02/11/23  1410 02/12/23  0510 02/12/23  1136   GLUCOSE 225* 733* 543*        PT/INR:    Lab Results   Component Value Date/Time    PROTIME 13.6 02/11/2023 02:24 PM    INR 1.2 02/11/2023 02:24 PM     PTT:    Lab Results   Component Value Date/Time    APTT 32.7 02/11/2023 02:24 PM       Comprehensive Metabolic Profile:   Recent Labs     02/11/23  0645 02/11/23  1410 02/12/23  0510 02/12/23  1136    138 131*  --    K 4.7 3.6 5.3*  --    CL 94* 96* 90*  --    CO2 23 27 25  --    BUN 49* 17 35*  --    CREATININE 9.7* 4.3* 5.8*  --    GLUCOSE 194* 225* 733* 543*   CALCIUM 9.3 8.8 9.5  --    PROT 7.5 6.7  --   --    LABALBU 3.6 3.3*  --   --    BILITOT 0.6 0.6  --   --    ALKPHOS 135* 124*  --   --    AST 25 21  --   --    ALT 26 24  --   --       Magnesium:   Lab Results   Component Value Date/Time    MG 3.0 02/10/2023 05:50 PM     Phosphorus:   Lab Results   Component Value Date/Time    PHOS 6.3 02/12/2023 05:10 AM     Ionized Calcium: No results found for: CAION   Troponin: No results for input(s): TROPONINI in the last 72 hours. Urinalysis: Urine dipstick shows not done. Micro exam: not done.       Medications     Infusions: none    Nutrition: diabetic diet      ATB:   Antibiotics  Days   cefepime 2   Azithromycin  2       Cultures: waiting respiratory, blood culture, MRSA nares        Imaging     XR CHEST PORTABLE    Result Date: 2/12/2023  Improved aeration bilaterally. Persistent interstitial opacities are present in mid and lower lung fields. XR CHEST PORTABLE    Result Date: 2/10/2023  Right lung airspace opacities. Correlate with pneumonia clinically. CTA PULMONARY W CONTRAST    Result Date: 2/11/2023  1. Cardiomegaly with small right and trace left-sided pleural effusions, as well as diffuse ground-glass infiltrates and interlobular septal thickening felt to represent cardiogenic pulmonary edema. 2. Patchy alveolar consolidative changes are noted as well, which may also be related to pulmonary edema though can be seen with multilobar pneumonia. 3. Mild mediastinal reactive lymphadenopathy. Resident's Assessment and Plan     Rosalee Rizo   , 44 y.o. , female came with CC : SOB, hypotension after HD     has a past medical history of JOLLY (acute kidney injury) ([facility] [address] AVF (arteriovenous fistula) ([facility] [address] Chronic kidney disease, Dialysis AV fistula malfunction, initial encounter ([facility] [address] DM type 1 (diabetes mellitus, type 1) ([facility] [address] Encounter regarding vascular access for dialysis for ESRD ([facility] [address] ESRD (end stage renal disease) ([facility] [address] Hemodialysis patient ([facility] [address] Hypertension, and Tobacco abuse. Days since Admission: 3  Days on Ventilator : 0    Consults:   ID  Nephrology    Assessment and plan:      Neurology  Pt is alert and oriented after improvement of BP     Cardiovascular     Septic vs hypovolemic shock  BP 85/45 during RRT  HD session with removal of 3600 cc fluid  Pro calcitonin trending up 5.48>>8.29  1L NS bolus given yestaerday  Follow respiratory, blood culture, MRSA Nares  Abx: cefepime and azithromycin, day 2  Appreciate ID recommendations     2. HTN, on hydralazine q8 and carvedilol 50+25 a day.  Currently on hold  Restart carvedilol 50 mg in the morning and 25 in the evening  Will restart hydralazine if BP tolerates     Respiratory     Acute hypoxic respiratory insuficiency 2/2 HAP/CAP vs edema 2/2 missed HD, on 4 L NC  On 2 L NC, no signs of hypoxia  CTA:  1. Cardiomegaly with small right and trace left-sided pleural effusions, as   well as diffuse ground-glass infiltrates and interlobular septal thickening   felt to represent cardiogenic pulmonary edema. 2. Patchy alveolar consolidative changes are noted as well, which may also be   related to pulmonary edema though can be seen with multilobar pneumonia. 3. Mild mediastinal reactive lymphadenopathy. Pro calcitonin trending up 5.48>>8.29  Will continue abx regimen above  Follow respiratory, blood culture, MRSA Nares  Follow Legionella, pneumoniae ag  Respiratory panel negative  Abx: cefepime and azithromycin  Appreciate ID recommendations  Will continue scheduled HD on Monday     Endocrinology     Dmt1, on glargine 16 units at night, and 10 units + SS TID with meals at home  Hard to control BG in setting of Given Steroids once  B-hydroxybutyrate slightly elevated, pH normal, BG trending down from 7002 to 500s  Continue 12 units glargine at night, 10 units Lispro 3 times daily with meals  POCT ac/hs       Nephrology     1. ESRD on HD  Has HD scheudled on T-TH-Sat, had HD session with removal of 3600 cc fluid  Appreciate nephrology recommendations, will continue HD as scheduled, on Monday     Vit D deficiency, follow vit D level-WNL     For transfer     PT/OT: not indicated  DVT ppx: heparin 5000 TID  GI ppx: Pepcid     Next of Kin/ POA: none     Code Status: full           Sigrid Paris MD, PGY-1  Attending physician: Dr. Lauren Hernandez      NOTE: This report was transcribed using voice recognition software. Every effort was made to ensure accuracy; however, inadvertent computerized transcription errors may be present.       [facility]  Department of Pulmonary, Critical Care and Sleep Medicine  [address] [facility]  Department of Internal Medicine      During multidisciplinary team rounds Paris Lara is a 44 y.o. female was seen, examined and discussed at the bedside. This is confirmation that I have personally seen and examined the patient and that the key elements of the encounter were outlined by me (> 85 % time). I personally reviewed medications & laboratory data. We if needed adjusted antibiotics and medications and correction are noted in the clinical pharmacy note. I presonally reviewed the days radiographic images and compared them to the previous images. I personally discussed the care with the consultants on Paris Lara case. If present the family is updated. Critical Care time is 33 minutes.      Carri Hall DO, FACP, FCCP, Travis roman,

## 2023-02-12 NOTE — PROGRESS NOTES
Pharmacy Consultation Note  (Antibiotic Dosing and Monitoring)    Initial consult date: 2/11/23  Consulting physician/provider: Dr. Nikkie Olmedo  Drug: Vancomycin  Indication: HAP; 14 days     Age/  Gender Height Weight IBW  Allergy Information   37 y.o./female 4' 11\" (149.9 cm) 135 lb (61.2 kg)     Ideal body weight: 48.8 kg (107 lb 8.4 oz)  Adjusted ideal body weight: 55.7 kg (122 lb 11.4 oz)   Patient has no known allergies. Renal Function:  Recent Labs     02/11/23  0645 02/11/23  1410 02/12/23  0510   BUN 49* 17 35*   CREATININE 9.7* 4.3* 5.8*         Intake/Output Summary (Last 24 hours) at 2/12/2023 0756  Last data filed at 2/12/2023 0616  Gross per 24 hour   Intake 780 ml   Output 3661 ml   Net -2881 ml         Vancomycin Monitoring:  Trough:  No results for input(s): VANCOTROUGH in the last 72 hours. Random:    Recent Labs     02/12/23  0510   VANCORANDOM 14.4       Recent Labs     02/10/23  2158   BLOODCULT2 24 Hours no growth          Historical Cultures:  Organism   Date Value Ref Range Status   05/19/2021 Anaerobic gram negative sharon (A)  Final   05/19/2021 Staphylococcus epidermidis (A)  Final   05/19/2021 Corynebacterium species (A)  Final     Recent Labs     02/10/23  2158   BC 24 Hours no growth       Vancomycin Administration Times:    Recent vancomycin administrations                     vancomycin (VANCOCIN) 1,000 mg in sodium chloride 0.9 % 250 mL IVPB ([id number]) (mg) 1,000 mg New Bag 02/11/23 1204    vancomycin (VANCOCIN) 1,250 mg in sodium chloride 0.9 % 250 mL IVPB (mg) 1,250 mg New Bag 02/11/23 0000                  Assessment:  Patient is a 44 y.o. female who has been initiated on vancomycin  Estimated Creatinine Clearance: 12 mL/min (A) (based on SCr of 5.8 mg/dL (H)).   To dose vancomycin, pharmacy will be utilizing dosing based off of levels due to patient requiring hemodialysis  Vancomycin random 14.4 mcg/mL    Plan:  Vancomycin 500 mg IV x 1 dose  Random level tomorrow AM  Will continue to follow dialysis plans  Pharmacy to follow      Ashanti GuallpaD, BCPS, [facility] 2/12/2023 7:56 AM

## 2023-02-13 VITALS
WEIGHT: 145.5 LBS | BODY MASS INDEX: 29.33 KG/M2 | SYSTOLIC BLOOD PRESSURE: 102 MMHG | DIASTOLIC BLOOD PRESSURE: 55 MMHG | OXYGEN SATURATION: 98 % | RESPIRATION RATE: 20 BRPM | TEMPERATURE: 97.6 F | HEART RATE: 90 BPM | HEIGHT: 59 IN

## 2023-02-13 LAB
ANION GAP SERPL CALCULATED.3IONS-SCNC: 17 MMOL/L (ref 7–16)
BASOPHILS ABSOLUTE: 0.03 E9/L (ref 0–0.2)
BASOPHILS RELATIVE PERCENT: 0.5 % (ref 0–2)
BETA-HYDROXYBUTYRATE: 0.12 MMOL/L (ref 0.02–0.27)
BUN BLDV-MCNC: 45 MG/DL (ref 6–20)
CALCIUM SERPL-MCNC: 9.4 MG/DL (ref 8.6–10.2)
CHLORIDE BLD-SCNC: 94 MMOL/L (ref 98–107)
CO2: 23 MMOL/L (ref 22–29)
CORTISOL TOTAL: 15.19 MCG/DL (ref 2.68–18.4)
CREAT SERPL-MCNC: 7.5 MG/DL (ref 0.5–1)
EKG ATRIAL RATE: 107 BPM
EKG P AXIS: 57 DEGREES
EKG P-R INTERVAL: 126 MS
EKG Q-T INTERVAL: 330 MS
EKG QRS DURATION: 70 MS
EKG QTC CALCULATION (BAZETT): 440 MS
EKG R AXIS: 20 DEGREES
EKG T AXIS: 59 DEGREES
EKG VENTRICULAR RATE: 107 BPM
EOSINOPHILS ABSOLUTE: 0.24 E9/L (ref 0.05–0.5)
EOSINOPHILS RELATIVE PERCENT: 4.2 % (ref 0–6)
GFR SERPL CREATININE-BSD FRML MDRD: 7 ML/MIN/1.73
GLUCOSE BLD-MCNC: 321 MG/DL (ref 74–99)
HCT VFR BLD CALC: 26.3 % (ref 34–48)
HEMOGLOBIN: 8.9 G/DL (ref 11.5–15.5)
IMMATURE GRANULOCYTES #: 0.02 E9/L
IMMATURE GRANULOCYTES %: 0.3 % (ref 0–5)
LYMPHOCYTES ABSOLUTE: 1.46 E9/L (ref 1.5–4)
LYMPHOCYTES RELATIVE PERCENT: 25.4 % (ref 20–42)
MCH RBC QN AUTO: 32.5 PG (ref 26–35)
MCHC RBC AUTO-ENTMCNC: 33.8 % (ref 32–34.5)
MCV RBC AUTO: 96 FL (ref 80–99.9)
METER GLUCOSE: 116 MG/DL (ref 74–99)
METER GLUCOSE: 317 MG/DL (ref 74–99)
METER GLUCOSE: 342 MG/DL (ref 74–99)
MONOCYTES ABSOLUTE: 0.45 E9/L (ref 0.1–0.95)
MONOCYTES RELATIVE PERCENT: 7.8 % (ref 2–12)
NEUTROPHILS ABSOLUTE: 3.55 E9/L (ref 1.8–7.3)
NEUTROPHILS RELATIVE PERCENT: 61.8 % (ref 43–80)
ORGANISM: ABNORMAL
PDW BLD-RTO: 15.7 FL (ref 11.5–15)
PHOSPHORUS: 7.8 MG/DL (ref 2.5–4.5)
PLATELET # BLD: 187 E9/L (ref 130–450)
PMV BLD AUTO: 10 FL (ref 7–12)
POTASSIUM SERPL-SCNC: 4.5 MMOL/L (ref 3.5–5)
RBC # BLD: 2.74 E12/L (ref 3.5–5.5)
SODIUM BLD-SCNC: 134 MMOL/L (ref 132–146)
VANCOMYCIN RANDOM: 18 MCG/ML (ref 5–40)
WBC # BLD: 5.8 E9/L (ref 4.5–11.5)

## 2023-02-13 PROCEDURE — 36415 COLL VENOUS BLD VENIPUNCTURE: CPT

## 2023-02-13 PROCEDURE — 6370000000 HC RX 637 (ALT 250 FOR IP): Performed by: STUDENT IN AN ORGANIZED HEALTH CARE EDUCATION/TRAINING PROGRAM

## 2023-02-13 PROCEDURE — 6360000002 HC RX W HCPCS: Performed by: INTERNAL MEDICINE

## 2023-02-13 PROCEDURE — 82010 KETONE BODYS QUAN: CPT

## 2023-02-13 PROCEDURE — 80048 BASIC METABOLIC PNL TOTAL CA: CPT

## 2023-02-13 PROCEDURE — 2580000003 HC RX 258: Performed by: INTERNAL MEDICINE

## 2023-02-13 PROCEDURE — 90935 HEMODIALYSIS ONE EVALUATION: CPT

## 2023-02-13 PROCEDURE — 84100 ASSAY OF PHOSPHORUS: CPT

## 2023-02-13 PROCEDURE — 82533 TOTAL CORTISOL: CPT

## 2023-02-13 PROCEDURE — 80202 ASSAY OF VANCOMYCIN: CPT

## 2023-02-13 PROCEDURE — 82962 GLUCOSE BLOOD TEST: CPT

## 2023-02-13 PROCEDURE — 6360000002 HC RX W HCPCS: Performed by: FAMILY MEDICINE

## 2023-02-13 PROCEDURE — 85025 COMPLETE CBC W/AUTO DIFF WBC: CPT

## 2023-02-13 PROCEDURE — 6370000000 HC RX 637 (ALT 250 FOR IP): Performed by: FAMILY MEDICINE

## 2023-02-13 PROCEDURE — 6370000000 HC RX 637 (ALT 250 FOR IP)

## 2023-02-13 PROCEDURE — 2580000003 HC RX 258: Performed by: FAMILY MEDICINE

## 2023-02-13 PROCEDURE — 93010 ELECTROCARDIOGRAM REPORT: CPT | Performed by: INTERNAL MEDICINE

## 2023-02-13 PROCEDURE — 99233 SBSQ HOSP IP/OBS HIGH 50: CPT | Performed by: INTERNAL MEDICINE

## 2023-02-13 RX ORDER — INSULIN LISPRO 100 [IU]/ML
0-16 INJECTION, SOLUTION INTRAVENOUS; SUBCUTANEOUS
Status: DISCONTINUED | OUTPATIENT
Start: 2023-02-13 | End: 2023-02-13 | Stop reason: HOSPADM

## 2023-02-13 RX ORDER — CEFEPIME HYDROCHLORIDE 2 G/1
INJECTION, POWDER, FOR SOLUTION INTRAVENOUS
Status: DISCONTINUED
Start: 2023-02-13 | End: 2023-02-13 | Stop reason: HOSPADM

## 2023-02-13 RX ORDER — MUPIROCIN CALCIUM 20 MG/G
CREAM TOPICAL 2 TIMES DAILY
Status: DISCONTINUED | OUTPATIENT
Start: 2023-02-13 | End: 2023-02-13 | Stop reason: HOSPADM

## 2023-02-13 RX ORDER — LINEZOLID 600 MG/1
600 TABLET, FILM COATED ORAL 2 TIMES DAILY
Qty: 10 TABLET | Refills: 0 | Status: SHIPPED | OUTPATIENT
Start: 2023-02-13 | End: 2023-02-18

## 2023-02-13 RX ORDER — LINEZOLID 600 MG/1
600 TABLET, FILM COATED ORAL 2 TIMES DAILY
Qty: 10 TABLET | Refills: 0 | Status: SHIPPED | OUTPATIENT
Start: 2023-02-13 | End: 2023-02-13 | Stop reason: SDUPTHER

## 2023-02-13 RX ORDER — INSULIN LISPRO 100 [IU]/ML
0-4 INJECTION, SOLUTION INTRAVENOUS; SUBCUTANEOUS NIGHTLY
Status: DISCONTINUED | OUTPATIENT
Start: 2023-02-13 | End: 2023-02-13 | Stop reason: HOSPADM

## 2023-02-13 RX ORDER — MUPIROCIN CALCIUM 20 MG/G
CREAM TOPICAL
Qty: 30 G | Refills: 2 | Status: SHIPPED | OUTPATIENT
Start: 2023-02-13

## 2023-02-13 RX ORDER — INSULIN GLARGINE-YFGN 100 [IU]/ML
15 INJECTION, SOLUTION SUBCUTANEOUS NIGHTLY
Status: DISCONTINUED | OUTPATIENT
Start: 2023-02-13 | End: 2023-02-13 | Stop reason: HOSPADM

## 2023-02-13 RX ADMIN — FAMOTIDINE 20 MG: 20 TABLET, FILM COATED ORAL at 09:01

## 2023-02-13 RX ADMIN — INSULIN LISPRO 12 UNITS: 100 INJECTION, SOLUTION INTRAVENOUS; SUBCUTANEOUS at 13:46

## 2023-02-13 RX ADMIN — Medication 2000 UNITS: at 09:01

## 2023-02-13 RX ADMIN — HEPARIN SODIUM 5000 UNITS: 10000 INJECTION INTRAVENOUS; SUBCUTANEOUS at 05:19

## 2023-02-13 RX ADMIN — SODIUM CHLORIDE, PRESERVATIVE FREE 10 ML: 5 INJECTION INTRAVENOUS at 09:01

## 2023-02-13 RX ADMIN — INSULIN LISPRO 10 UNITS: 100 INJECTION, SOLUTION INTRAVENOUS; SUBCUTANEOUS at 05:19

## 2023-02-13 RX ADMIN — CARVEDILOL 50 MG: 25 TABLET, FILM COATED ORAL at 09:01

## 2023-02-13 RX ADMIN — CEFEPIME 2000 MG: 2 INJECTION, POWDER, FOR SOLUTION INTRAVENOUS at 11:06

## 2023-02-13 RX ADMIN — INSULIN LISPRO 10 UNITS: 100 INJECTION, SOLUTION INTRAVENOUS; SUBCUTANEOUS at 13:48

## 2023-02-13 ASSESSMENT — PAIN SCALES - GENERAL: PAINLEVEL_OUTOF10: 0

## 2023-02-13 NOTE — FLOWSHEET NOTE
02/13/23 1200   Vital Signs   BP (!) 102/55   Heart Rate 90   Resp 20   Post-Hemodialysis Assessment   Post-Treatment Procedures Blood returned; Access bleeding time < 10 minutes   Machine Disinfection Process Exterior Machine Disinfection   Rinseback Volume (ml) 300 ml   Dialyzer Clearance Heavily streaked   Duration of Treatment (minutes) 240 minutes   Heparin Amount Administered During Treatment (mL) 0 mL   Hemodialysis Intake (ml) 300 ml   Hemodialysis Output (ml) 2800 ml   NET Removed (ml) 2500   Tolerated Treatment Good   Patient Response to Treatment Tolerated tx well on 3k bath   Time Off 1145   Patient Disposition Remain in ICU/ED

## 2023-02-13 NOTE — PROGRESS NOTES
Hospitalist Progress Note      SYNOPSIS: Patient admitted on 2/10/2023 for shortness of breath. Patient presented to the emergency department with shortness of breath. Patient has a history of end-stage renal disease on hemodialysis. Has missed the past 2 dialysis sessions. Patient reports worsening of shortness of breath with exertion. Patient reports a dry nonproductive cough. Vital signs show the patient to be tachycardic with a rate of 110. On arrival SPO2 was 78% on room air. Currently she is 93% on 2 L nasal cannula. The patient is afebrile. Laboratory studies demonstrate potassium 5.2, BUN 88, creatinine 16.1, anion gap 21, magnesium 3.0, glucose 72, phosphorus 10.6, hemoglobin 9.2. Chest x-ray shows right lung airspace opacities. Patient was started on empiric antibiotics for healthcare associated pneumonia. Patient received dialysis on 2/10 and  inpatient. Patient transferred to ICU on  for low blood pressure. Her medications were adjusted and patient remained stable. SUBJECTIVE:    Patient seen and examined in the ICU. Alert awake oriented x3 and appears comfortable. Denies any chest pain. Shortness of breath improved. Records reviewed. Stable overnight. No other overnight issues reported. Temp (24hrs), Av.1 °F (36.7 °C), Min:97.4 °F (36.3 °C), Max:98.8 °F (37.1 °C)    DIET: ADULT DIET; Regular; 4 carb choices (60 gm/meal); Low Potassium (Less than 3000 mg/day); Low Phosphorus (Less than 1000 mg)  CODE: Full Code    Intake/Output Summary (Last 24 hours) at 2023 0809  Last data filed at 2023 0612  Gross per 24 hour   Intake 960 ml   Output 2 ml   Net 958 ml       OBJECTIVE:    /66   Pulse 90   Temp 97.4 °F (36.3 °C) (Temporal)   Resp 16   Ht 4' 11\" (1.499 m)   Wt 145 lb 8.1 oz (66 kg)   SpO2 100%   BMI 29.39 kg/m²     General appearance: No apparent distress, appears stated age and cooperative. HEENT:  Conjunctivae/corneas clear. Neck: Supple. No jugular venous distention. Respiratory: Clear to auscultation bilaterally, normal respiratory effort  Cardiovascular: Regular rate rhythm, normal S1-S2  Abdomen: Soft, nontender, nondistended  Musculoskeletal: No clubbing, cyanosis, no bilateral lower extremity edema. Brisk capillary refill. Skin:  No rashes  on visible skin  Neurologic: awake, alert and following commands       ASSESSMENT:  -Acute respiratory failure with hypoxia  -Possible healthcare associated pneumonia  -Hypomagnesemia  -Chronic anemia  -ESRD on hemodialysis  -Missed dialysis  -Sinus tachycardia  -Diabetes mellitus  -Hypertension        PLAN:  -Admit to medicine  -Consult nephrology  -Supplemental O2 as needed  -Continue vancomycin, ceftriaxone and doxycycline, consult ID for further evaluation management  ID recommended continuing cefepime, azithromycin, vancomycin, waiting for blood cultures showed no growth on the morning of 2/13  -Check procalcitonin significantly elevated  -Hemodialysis per nephrology guidance  -Monitor serum electrolytes  -Telemetry  -Respiratory cultures  -Continue home medications    DISPOSITION:   Unclear discharge disposition, ongoing treatment for fluid overload as well as pneumonia. Patient remains in ICU and on IV antibiotics for healthcare associated pneumonia.     Medications:  REVIEWED DAILY    Infusion Medications    sodium chloride      dextrose       Scheduled Medications    insulin lispro  0-16 Units SubCUTAneous TID WC    insulin lispro  0-4 Units SubCUTAneous Nightly    insulin glargine-yfgn  15 Units SubCUTAneous Nightly    insulin regular  10 Units SubCUTAneous Once    carvedilol  25 mg Oral QPM    carvedilol  50 mg Oral QAM    vitamin D  2,000 Units Oral Daily    famotidine  20 mg Oral QAM    [Held by provider] hydrALAZINE  25 mg Oral 3 times per day    insulin lispro  10 Units SubCUTAneous TID AC    sodium chloride flush  10 mL IntraVENous 2 times per day    heparin (porcine) 5,000 Units SubCUTAneous 3 times per day    vancomycin (VANCOCIN) intermittent dosing (placeholder)   Other RX Placeholder    azithromycin  500 mg IntraVENous Q24H    cefepime  1,000 mg IntraVENous Q24H     PRN Meds: white petrolatum, albuterol, sodium chloride flush, sodium chloride, promethazine **OR** ondansetron, polyethylene glycol, acetaminophen **OR** acetaminophen, glucose, dextrose bolus **OR** dextrose bolus, glucagon (rDNA), dextrose, labetalol, hydrALAZINE    Labs:     Recent Labs     02/11/23  1410 02/12/23  0510 02/13/23  0415   WBC 7.4 6.0 5.8   HGB 8.6* 8.9* 8.9*   HCT 25.5* 25.8* 26.3*    162 187       Recent Labs     02/10/23  1750 02/11/23  0447 02/11/23  1410 02/12/23  0510 02/13/23  0415      < > 138 131* 134   K 5.2*   < > 3.6 5.3* 4.5   CL 99   < > 96* 90* 94*   CO2 18*   < > 27 25 23   BUN 88*   < > 17 35* 45*   CREATININE 16.1*   < > 4.3* 5.8* 7.5*   CALCIUM 9.6   < > 8.8 9.5 9.4   PHOS 10.6*  --   --  6.3* 7.8*    < > = values in this interval not displayed. Recent Labs     02/11/23  0447 02/11/23  0645 02/11/23  1410   PROT 7.6 7.5 6.7   ALKPHOS 139* 135* 124*   ALT 28 26 24   AST 27 25 21   BILITOT 0.6 0.6 0.6       Recent Labs     02/11/23  1424   INR 1.2       No results for input(s): Panfilo Campos in the last 72 hours. Chronic labs:    Lab Results   Component Value Date    CHOL 155 10/08/2013    TRIG 68 10/08/2013    HDL 40 01/29/2021    LDLCALC 41 01/29/2021    TSH 3.850 01/27/2021    INR 1.2 02/11/2023    LABA1C 7.6 (H) 02/12/2023       Radiology: REVIEWED DAILY    +++++++++++++++++++++++++++++++++++++++++++++++++  Meghan Freed MD  [facility] [place] Physician - [address] [place] [address]  +++++++++++++++++++++++++++++++++++++++++++++++++  NOTE: This report was transcribed using voice recognition software. Every effort was made to ensure accuracy; however, inadvertent computerized transcription errors may be present.

## 2023-02-13 NOTE — PROGRESS NOTES
A copy of discharge instructions and antibiotic script placed in a folder and given to patient to take home. Went over discharge instructions - all questions answered at this time.

## 2023-02-13 NOTE — DISCHARGE SUMMARY
Hospitalist Discharge Summary    Patient ID: Sung Dec   Patient : 1985  Patient's PCP: Edwar Heller DO    Admit Date: 2/10/2023   Admitting Physician: Kalee Miranda DO    Discharge Date:  2023   Discharge Physician: Maria Elena Roblero MD   Discharge Condition: Stable  Discharge Disposition: [facility] course in brief:  (Please refer to daily progress notes for a comprehensive review of the hospitalization by requesting medical records)    Patient admitted on 2/10/2023 for shortness of breath. Patient presented to the emergency department with shortness of breath. Patient has a history of end-stage renal disease on hemodialysis. Has missed the past 2 dialysis sessions. Patient reports worsening of shortness of breath with exertion. Patient reports a dry nonproductive cough. Vital signs show the patient to be tachycardic with a rate of 110. On arrival SPO2 was 78% on room air. Currently she is 93% on 2 L nasal cannula. The patient is afebrile. Laboratory studies demonstrate potassium 5.2, BUN 88, creatinine 16.1, anion gap 21, magnesium 3.0, glucose 72, phosphorus 10.6, hemoglobin 9.2. Chest x-ray shows right lung airspace opacities. Patient was started on empiric antibiotics for healthcare associated pneumonia. Patient received dialysis on 2/10 and  inpatient. Patient transferred to ICU on  for low blood pressure. Her medications were adjusted and patient remained stable. Patient appeared comfortable and significantly improved on the morning of . ID recommended Zyvox for 5 more days. Nephrology cleared the patient for discharge to after dialysis. Medically stable and cleared for discharge to home.       Consults:   IP CONSULT TO NEPHROLOGY  IP CONSULT TO HOSPITALIST  IP CONSULT TO PHARMACY  IP CONSULT TO SOCIAL WORK  IP CONSULT TO INFECTIOUS DISEASES    Discharge Diagnoses:    ASSESSMENT:  -Acute respiratory failure with hypoxia  -Possible healthcare associated pneumonia  -Hypomagnesemia  -Chronic anemia  -ESRD on hemodialysis  -Missed dialysis  -Sinus tachycardia  -Diabetes mellitus  -Hypertension        PLAN:  -Admit to medicine  -Consult nephrology  -Supplemental O2 as needed  -Continue vancomycin, ceftriaxone and doxycycline, consult ID for further evaluation management  ID recommended continuing cefepime, azithromycin, vancomycin, waiting for blood cultures showed no growth on the morning of 2/13  -Check procalcitonin significantly elevated  -Hemodialysis per nephrology guidance  -Monitor serum electrolytes  -Telemetry  -Respiratory cultures  -Continue home medications        Discharge Instructions / Follow up:    Future Appointments   Date Time Provider Titus Potter   3/1/2023 11:30 AM Cheryl Bailon  [facility]   5/24/2023  2:00 PM Raysa Cheema MD [facility]       Continued appropriate risk factor modification of blood pressure, diabetes and serum lipids will remain essential to reducing risk of future atherosclerotic development    Activity: activity as tolerated    Significant labs:  CBC:   Recent Labs     02/11/23  1410 02/12/23  0510 02/13/23  0415   WBC 7.4 6.0 5.8   RBC 2.64* 2.75* 2.74*   HGB 8.6* 8.9* 8.9*   HCT 25.5* 25.8* 26.3*   MCV 96.6 93.8 96.0   RDW 16.4* 16.2* 15.7*    162 187     BMP:   Recent Labs     02/10/23  1750 02/11/23  0447 02/11/23  1410 02/12/23  0510 02/13/23  0415      < > 138 131* 134   K 5.2*   < > 3.6 5.3* 4.5   CL 99   < > 96* 90* 94*   CO2 18*   < > 27 25 23   BUN 88*   < > 17 35* 45*   CREATININE 16.1*   < > 4.3* 5.8* 7.5*   MG 3.0*  --   --   --   --    PHOS 10.6*  --   --  6.3* 7.8*    < > = values in this interval not displayed.      LFT:  Recent Labs     02/11/23  0447 02/11/23  0645 02/11/23  1410   PROT 7.6 7.5 6.7   ALKPHOS 139* 135* 124*   ALT 28 26 24   AST 27 25 21   BILITOT 0.6 0.6 0.6     PT/INR:   Recent Labs     02/11/23  1424   INR 1.2   APTT 32.7 BNP: No results for input(s): BNP in the last 72 hours. Hgb A1C:   Lab Results   Component Value Date    LABA1C 7.6 (H) 02/12/2023     Folate and B12:   Lab Results   Component Value Date    UIPBYBIB73 811 01/27/2021   ,   Lab Results   Component Value Date    FOLATE 19.4 01/27/2021     Thyroid Studies:   Lab Results   Component Value Date    TSH 3.850 01/27/2021       Urinalysis:    Lab Results   Component Value Date/Time    NITRU Negative 11/23/2017 10:05 PM    [address] 11/23/2017 10:05 PM    WBCUA NONE 11/21/2010 10:30 PM    BACTERIA RARE 11/23/2017 10:05 PM    RBCUA 5-10 11/23/2017 10:05 PM    RBCUA 0-1 09/29/2013 02:15 PM    BLOODU MODERATE 11/23/2017 10:05 PM    SPECGRAV 1.015 11/23/2017 10:05 PM    GLUCOSEU 500 11/23/2017 10:05 PM    GLUCOSEU >=1000 11/21/2010 10:30 PM       Imaging:  XR CHEST PORTABLE    Result Date: 2/12/2023  EXAMINATION: ONE XRAY VIEW OF THE CHEST 2/12/2023 8:10 am COMPARISON: February 10, 2023 HISTORY: ORDERING SYSTEM PROVIDED HISTORY: PNA TECHNOLOGIST PROVIDED HISTORY: Reason for exam:->PNA What reading provider will be dictating this exam?->CRC FINDINGS: Moderate cardiomegaly. Interstitial opacities bilaterally notable in the lung bases. No obvious pleural effusion. No pneumothorax. Improved aeration bilaterally. Persistent interstitial opacities are present in mid and lower lung fields. XR CHEST PORTABLE    Result Date: 2/10/2023  EXAMINATION: ONE XRAY VIEW OF THE CHEST 2/10/2023 4:11 pm COMPARISON: 07/28/2022 HISTORY: ORDERING SYSTEM PROVIDED HISTORY: chest pain TECHNOLOGIST PROVIDED HISTORY: Reason for exam:->chest pain What reading provider will be dictating this exam?->CRC FINDINGS: Right lung opacities. There is no effusion or pneumothorax. Stable heart size. The osseous structures are without acute process. Right lung airspace opacities. Correlate with pneumonia clinically.      CTA PULMONARY W CONTRAST    Result Date: 2/11/2023  EXAMINATION: CTA OF THE CHEST 2/11/2023 1:26 am TECHNIQUE: CTA of the chest was performed after the administration of intravenous contrast.  Multiplanar reformatted images are provided for review. MIP images are provided for review. Automated exposure control, iterative reconstruction, and/or weight based adjustment of the mA/kV was utilized to reduce the radiation dose to as low as reasonably achievable. COMPARISON: 11/26/2018 HISTORY: ORDERING SYSTEM PROVIDED HISTORY: worsening SOB TECHNOLOGIST PROVIDED HISTORY: Reason for exam:->worsening SOB What reading provider will be dictating this exam?->CRC FINDINGS: Pulmonary Arteries: Pulmonary arteries are well opacified for evaluation. No pulmonary emboli are seen. Mediastinum: Mild cardiomegaly. No thoracic aortic aneurysm is identified. No dissection. No great vessel stenosis. Mild mediastinal lymphadenopathy. No significant pericardial effusion. No focal esophageal thickening is identified. Lungs/pleura: There is a small right and trace left pleural effusion. Diffuse ground-glass opacities are seen throughout the lungs, with some interlobular smooth septal thickening as well as mild patchy alveolar consolidative changes. Upper Abdomen: No acute process seen in the upper abdomen. Fatty liver infiltration. Soft Tissues/Bones: No axillary or supraclavicular lymphadenopathy is identified. 1. Cardiomegaly with small right and trace left-sided pleural effusions, as well as diffuse ground-glass infiltrates and interlobular septal thickening felt to represent cardiogenic pulmonary edema. 2. Patchy alveolar consolidative changes are noted as well, which may also be related to pulmonary edema though can be seen with multilobar pneumonia. 3. Mild mediastinal reactive lymphadenopathy.        Discharge Medications:      Medication List        START taking these medications      linezolid 600 MG tablet  Commonly known as: ZYVOX  Take 1 tablet by mouth 2 times daily for 5 days            CHANGE how you take these medications      hydrALAZINE 50 MG tablet  Commonly known as: APRESOLINE  Take 1 tablet by mouth every 8 hours  What changed: how much to take            CONTINUE taking these medications      Acetaminophen Extra Strength 500 MG tablet  Generic drug: acetaminophen  TAKE 1 TABLET BY MOUTH EVERY 6 HOURS AS NEEDED FOR PAIN     * albuterol sulfate  (90 Base) MCG/ACT inhaler  Commonly known as: PROVENTIL;VENTOLIN;PROAIR  Inhale 2 puffs into the lungs every 4-6 hours as needed for Wheezing or Shortness of Breath     * albuterol (2.5 MG/3ML) 0.083% nebulizer solution  Commonly known as: PROVENTIL  Take 3 mLs by nebulization every 6 hours as needed for Wheezing or Shortness of Breath     bacitracin zinc 500 UNIT/GM ointment  Apply topically daily Apply topically 2 times daily. brompheniramine-pseudoephedrine-DM 2-30-10 MG/5ML syrup  Commonly known as: Bromfed DM  Take 10 mLs by mouth 4 times daily as needed for Congestion or Cough     carvedilol 25 MG tablet  Commonly known as: COREG     clotrimazole-betamethasone 1-0.05 % cream  Commonly known as: LOTRISONE  Apply topically 2 times daily.      Dexcom G6 Transmitter Misc  1 each by Does not apply route every 3 months     famotidine 20 MG tablet  Commonly known as: PEPCID  TAKE 1 TABLET BY MOUTH EVERY MORNING     Folic Acid 0.8 MG Caps     Glucose 15 GM/32ML Gel  Commonly known as: TRUEPLUS  Take 32 mLs by mouth daily as needed (hypoglycemia, BG <70)     insulin lispro (1 Unit Dial) 100 UNIT/ML Sopn  Commonly known as: HUMALOG/ADMELOG  Inject 10 Units into the skin 3 times daily (before meals) *Plus Sliding Scale* MAX 50 units a day     medroxyPROGESTERone 150 MG/ML injection  Commonly known as: DEPO-PROVERA     PROBIOTIC ADVANCED PO     Toujeo Max SoloStar 300 UNIT/ML Sopn  Generic drug: Insulin Glargine (2 Unit Dial)     Vitamin D3 50 MCG (2000 UT) Caps  Take 1 capsule by mouth daily           * This list has 2 medication(s) that are the same as other medications prescribed for you. Read the directions carefully, and ask your doctor or other care provider to review them with you. Where to Get Your Medications        You can get these medications from any pharmacy    Bring a paper prescription for each of these medications  linezolid 600 MG tablet         Time Spent on discharge is more than 45 minutes in the examination, evaluation, counseling and review of medications and discharge plan.    +++++++++++++++++++++++++++++++++++++++++++++++++  Lilly Landeros MD  [address]  +++++++++++++++++++++++++++++++++++++++++++++++++  NOTE: This report was transcribed using voice recognition software. Every effort was made to ensure accuracy; however, inadvertent computerized transcription errors may be present.

## 2023-02-13 NOTE — PROGRESS NOTES
[address] [facility]  Department of Internal Medicine   Internal Medicine Residency   MICU Progress Note    Patient:  Letty Akhtar 44 y.o. female  MRN: [id number]     Date of Service: 2023    Allergy: Patient has no known allergies. Subjective   Overnight BP stable, BG elevated in settings of steroids given once during RRT  Respiratory function stable  Had HD session today  Objective     VITAL SIGNS:  BP (!) 102/55   Pulse 90   Temp 97.6 °F (36.4 °C) (Temporal)   Resp 20   Ht 4' 11\" (1.499 m)   Wt 145 lb 8.1 oz (66 kg)   SpO2 98%   BMI 29.39 kg/m²   Tmax over 24 hours:  Temp (24hrs), Av.9 °F (36.6 °C), Min:97.4 °F (36.3 °C), Max:98.8 °F (37.1 °C)      Patient Vitals for the past 6 hrs:   BP Pulse Resp   23 1200 (!) 102/55 90 20   23 1115 (!) 143/71 89 --   23 1100 116/73 87 16   23 1045 133/72 86 --   23 1030 115/65 87 --   23 1015 (!) 101/53 88 --   23 1000 (!) 119/57 90 16   23 0945 (!) 119/57 91 --   23 0930 122/63 92 --   23 0915 (!) 110/59 90 --   23 0900 136/80 87 20   23 0845 (!) 166/87 86 --           Intake/Output Summary (Last 24 hours) at 2023 1432  Last data filed at 2023 1200  Gross per 24 hour   Intake 1140 ml   Output 2801 ml   Net -1661 ml       Wt Readings from Last 2 Encounters:   23 145 lb 8.1 oz (66 kg)   22 150 lb (68 kg)     Body mass index is 29.39 kg/m².         I & O - 24hr:   Intake/Output Summary (Last 24 hours) at 2023 1432  Last data filed at 2023 1200  Gross per 24 hour   Intake 1140 ml   Output 2801 ml   Net -1661 ml         Physical Exam:  General Appearance: alert, appears stated age, and cooperative  Neck: no adenopathy, no carotid bruit, no JVD, supple, symmetrical, trachea midline, and thyroid not enlarged, symmetric, no tenderness/mass/nodules  Lung: clear to auscultation bilaterally  Heart: regular rate and rhythm, S1, S2 normal, no murmur, click, rub or gallop  Abdomen: soft, non-tender; bowel sounds normal; no masses,  no organomegaly  Extremities:  extremities normal, atraumatic, no cyanosis or edema  Musculoskeletal: No joint swelling, no muscle tenderness. ROM normal in all joints of extremities.    Neurologic: Mental status: Alert, oriented, thought content appropriate    Lines     site day    Art line   None    TLC None    PICC None    Hemoaccess Fistula/Graft      VENT SETTINGS (Comprehensive) (if applicable): none     Additional Respiratory Assessments  Heart Rate: 90  Resp: 20  SpO2: 98 %    ABGs:   Recent Labs     02/12/23  1309   PH 7.393   PCO2 44.5   PO2 53.7*   HCO3 26.5*   BE 1.3   O2SAT 84.6*         Laboratory findings:  Complete Blood Count:   Recent Labs     02/11/23  1410 02/12/23  0510 02/13/23  0415   WBC 7.4 6.0 5.8   HGB 8.6* 8.9* 8.9*   HCT 25.5* 25.8* 26.3*    162 187          Last 3 Blood Glucose:   Recent Labs     02/12/23  0510 02/12/23  1136 02/13/23  0415   GLUCOSE 733* 543* 321*          PT/INR:    Lab Results   Component Value Date/Time    PROTIME 13.6 02/11/2023 02:24 PM    INR 1.2 02/11/2023 02:24 PM     PTT:    Lab Results   Component Value Date/Time    APTT 32.7 02/11/2023 02:24 PM       Comprehensive Metabolic Profile:   Recent Labs     02/11/23  1410 02/12/23  0510 02/12/23  1136 02/13/23  0415    131*  --  134   K 3.6 5.3*  --  4.5   CL 96* 90*  --  94*   CO2 27 25  --  23   BUN 17 35*  --  45*   CREATININE 4.3* 5.8*  --  7.5*   GLUCOSE 225* 733* 543* 321*   CALCIUM 8.8 9.5  --  9.4   PROT 6.7  --   --   --    LABALBU 3.3*  --   --   --    BILITOT 0.6  --   --   --    ALKPHOS 124*  --   --   --    AST 21  --   --   --    ALT 24  --   --   --         Magnesium:   Lab Results   Component Value Date/Time    MG 3.0 02/10/2023 05:50 PM     Phosphorus:   Lab Results   Component Value Date/Time    PHOS 7.8 02/13/2023 04:15 AM     Ionized Calcium: No results found for: CAION   Troponin: No results for input(s): TROPONINI in the last 72 hours. Urinalysis: Urine dipstick shows not done. Micro exam: not done. Medications     Infusions: none    Nutrition: diabetic diet      ATB:   Antibiotics  Days   cefepime 3   Azithromycin  2, stopped       Cultures: waiting respiratory, blood culture, MRSA nares +        Imaging     XR CHEST PORTABLE    Result Date: 2/12/2023  Improved aeration bilaterally. Persistent interstitial opacities are present in mid and lower lung fields. XR CHEST PORTABLE    Result Date: 2/10/2023  Right lung airspace opacities. Correlate with pneumonia clinically. CTA PULMONARY W CONTRAST    Result Date: 2/11/2023  1. Cardiomegaly with small right and trace left-sided pleural effusions, as well as diffuse ground-glass infiltrates and interlobular septal thickening felt to represent cardiogenic pulmonary edema. 2. Patchy alveolar consolidative changes are noted as well, which may also be related to pulmonary edema though can be seen with multilobar pneumonia. 3. Mild mediastinal reactive lymphadenopathy. Resident's Assessment and Plan     Angie Chris   , 44 y.o. , female came with CC : SOB, hypotension after HD     has a past medical history of JOLLY (acute kidney injury) ([address] AVF (arteriovenous fistula) ([address] Chronic kidney disease, Dialysis AV fistula malfunction, initial encounter ([facility] DM type 1 (diabetes mellitus, type 1) ([address] Encounter regarding vascular access for dialysis for ESRD ([facility] ESRD (end stage renal disease) ([address] Hemodialysis patient ([facility] Hypertension, and Tobacco abuse.        Days since Admission: 4  Days on Ventilator : 0    Consults:   ID  Nephrology    Assessment and plan:      Neurology  Pt is alert and oriented after improvement of BP     Cardiovascular     Septic vs hypovolemic shock  BP 85/45 during RRT, remains stable over the last 48 hours  HD session today with removal of 2500 cc fluid  Follow respiratory, blood culture,   MRSA Nares+  Abx: cefepime, day 3 and azithromycin, day 2-stopped. For d/c today on zyvox  Appreciate ID recommendations     2. HTN, on hydralazine q8 and carvedilol 50+25 a day at home. Continue carvedilol 50 mg in the morning and 25 in the evening  Will restart hydralazine if BP tolerates. Status post HD session today     Respiratory     Acute hypoxic respiratory insuficiency due to HAP/CAP vs edema 2/2 missed HD, resolved, on room air  CTA:  1. Cardiomegaly with small right and trace left-sided pleural effusions, as   well as diffuse ground-glass infiltrates and interlobular septal thickening   felt to represent cardiogenic pulmonary edema. 2. Patchy alveolar consolidative changes are noted as well, which may also be   related to pulmonary edema though can be seen with multilobar pneumonia. 3. Mild mediastinal reactive lymphadenopathy. Follow respiratory, blood culture  Legionella Ag, pneumoniae Ag negative  Respiratory panel negative  MRSA Nares+  Had HD session this morning with removal of 2500 cc of fluid  Abx: cefepime, day 3 and azithromycin, day 2-stopped. For d/c today on zyvox  Appreciate ID recommendations     Endocrinology     DMT1, on glargine 16 units at night, and 10 units + SS TID with meals at home  Hard to control BG in setting of Given Steroids once  Increase 12 units glargine at night to 15 units,  continue 10 units Lispro 3 times daily with meals and HDSS, LDSS at night  POCT BG ac/hs     Nephrology     1. ESRD on HD  Has HD scheudled on T-TH-Sat, had HD session today with removal of 2500 cc today  Appreciate nephrology recommendations, will continue HD as scheduled   Vit D deficiency, Vid D was restarted     For d/c per primary     PT/OT: not indicated  DVT ppx: heparin 5000 TID  GI ppx: Pepcid 20 mg daily     Next of Kin/ POA: none     Code Status: full           Archana Shin MD, PGY-1  Attending physician: Dr. Khadra Mckeon      NOTE: This report was transcribed using voice recognition software. Every effort was made to ensure accuracy; however, inadvertent computerized transcription errors may be present. [place]  Department of Pulmonary, Critical Care and Sleep Medicine  [address] [facility]  Department of Internal Medicine      During multidisciplinary team rounds Davi Aponte is a 44 y.o. female was seen, examined and discussed. This is confirmation that I have personally seen and examined the patient and that the key elements of the encounter were performed by me (> 85 % time). The medications & laboratory data was discussed and adjusted where necessary. The radiographic images were reviewed or with radiologist or consultant if felt dis-concordant with the exam or history. The above findings were corroborated, plans confirmed and changes made if needed. Family is updated at the bedside as available. Key issues of the case were discussed among consultants. Critical Care time is documented if appropriate. Overall continues to improve. Okay to transfer out of ICU to today.   May be discharged per primary team    [facility] DO

## 2023-02-13 NOTE — CARE COORDINATION
Case Management Assessment  Initial Evaluation    Date/Time of Evaluation: 2/13/2023 12:31 PM  Assessment Completed by: Pelon Rodriguez RN    If patient is discharged prior to next notation, then this note serves as note for discharge by case management. Patient Name: Rosalee Rizo                   YOB: 1985  Diagnosis: Acute respiratory failure ([facility] [J96.00]  ESRD (end stage renal disease) on dialysis ([facility] [N18.6, Z99.2]  Acute respiratory failure with hypoxia ([facility] [J96.01]                   Date / Time: 2/10/2023  3:45 PM    Patient Admission Status: Inpatient   Readmission Risk (Low < 19, Mod (19-27), High > 27): Readmission Risk Score: 19.6    Current PCP: Shayla Dyson, DO  PCP verified by CM? Yes    Chart Reviewed: Yes      History Provided by: Patient  Patient Orientation: Alert and Oriented, Person, Place, Situation, Self    Patient Cognition: Alert    Hospitalization in the last 30 days (Readmission):  No    If yes, Readmission Assessment in CM Navigator will be completed.     Advance Directives:      Code Status: Full Code   Patient's Primary Decision Maker is: Named in [address] [place] [address]    Primary Decision Maker: Juli Chung - Brother/Sister - [phone]    Secondary Decision Maker: Juan Jose Meneses - Brother/Sister - [phone]    Supplemental (Other) Decision Maker: Oscar Lin Other - [phone]    Supplemental (Other) Decision Maker: Brigid Wayne - Brother/Sister - [phone]    Supplemental (Other) Decision Maker: [facility] [phone]    Discharge Planning:    Patient lives with: Children Type of Home: House  Primary Care Giver: Self  Patient Support Systems include: Family Members, /   Current Financial resources:    Current community resources:    Current services prior to admission: Other (Comment) (dialysis)            Current DME:              Type of Home Care services: None    ADLS  Prior functional level: Independent in ADLs/IADLs  Current functional level: Independent in ADLs/IADLs    PT AM-PAC:   /24  OT AM-PAC:   /24    Family can provide assistance at DC: No (pt is HD pt with disabled child. she obtains most support from pt's care giver provided by [employer] [place]. Family avail only emergency)  Would you like Case Management to discuss the discharge plan with any other family members/significant others, and if so, who? No  Plans to Return to Present Housing: Yes  Other Identified Issues/Barriers to RETURNING to current housing:  limited family support, daughter has care giver, pt is HD pt  Potential Assistance needed at discharge: Home Care            Potential DME:    Patient expects to discharge to: [address] [place] [address] for transportation at discharge:      Financial    Payor: [employer] / Plan: [employer] / Product Type: *No Product type* /     Does insurance require precert for SNF: Yes    Potential assistance Purchasing Medications: No  Meds-to-Beds request: Yes      Kalie Irene [address] [place] [address] [phone] [facility] [phone] [address] [facility] [address] [facility] [address]  Phone: [phone] Fax: [phone]      Notes:    Factors facilitating achievement of predicted outcomes: Caregiver support, Motivated, Cooperative, and Pleasant    Barriers to discharge: Limited family support and HD pt with disabled childe  Additional Case Management Notes: see initial note    The Plan for Transition of Care is related to the following treatment goals of Acute respiratory failure ([address] [J96.00]  ESRD (end stage renal disease) on dialysis ([address] [N18.6, Z99.2]  Acute respiratory failure with hypoxia ([address] [F98.84]    IF APPLICABLE: The Patient and/or patient representative Jeff Ceballos and her family were provided with a choice of provider and agrees with the discharge plan.  Freedom of choice list with basic dialogue that supports the patient's individualized plan of care/goals and shares the quality data associated with the providers was provided to:     Patient Representative Name:       The Patient and/or Patient Representative Agree with the Discharge Plan?       Dick Reardon RN  Case Management Department  Ph: [phone] Fax: [phone]

## 2023-02-13 NOTE — PROGRESS NOTES
Nephrology Progress Note  Patient's Name: Carolyn James  9:20 AM  2/13/2023        Reason for Consult: ESRD HD dependent  Requesting Physician:  Rebecca Barrow DO    Chief Complaint: Shortness of breath; missed HD treatment for 1 week  History Obtained From:  patient and EHR    History of Present Ilness:    Carolyn James is a 44 y.o. female with a history of ESRD HD dependent, hypertension, type I DM. She presented to ED with complaints of increasing generalized edema including facial edema. This was associated with shortness of breath. She denies any chest pain. No cough. No fever or chills. Patient reports that she was unable to secure care for her special needs child. As a result she could not make her dialysis appointments. On arrival to ED vitals showed blood pressure 164/112, pulse 113, respirations 22. Temperature 97.5. Laboratory data showed high-sensitivity troponin 200, magnesium 3, phosphorus 10.6, WBC 6.6, hemoglobin 9.2, platelet count 803 K, potassium 5.2, CO2 18, BUN 88 and creatinine 16. Procalcitonin was 5.  4 9 chest x-ray showed right lung air space opacities viral respiratory panel was negative. CTA of the chest showed findings of pulmonary edema with question of multilobar pneumonia. There was no evidence of pulmonary embolism. Patient received vancomycin and ceftriaxone. She was subsequently taken to the hemodialysis suite for treatment. To be followed by admission to telemetry. Subjective:    2/11: Seen during hemodialysis; she states her breathing is much improved; she is having some cramping as a result of attempts to remove fluid    2/12: Tachycardia hypotension post dialysis yesterday associated with shortness of breath; transferred to MICU improved with fluid bolus; no shortness of breath this a.m.    2/13: Seen in MICU; no complaints of headaches or blurred vision.   No chest pain or shortness of breath      Allergies:  Patient has no known allergies. Current Medications:    insulin lispro (HUMALOG) injection vial 0-16 Units, TID WC  insulin lispro (HUMALOG) injection vial 0-4 Units, Nightly  insulin glargine-yfgn (SEMGLEE-YFGN) injection vial 15 Units, Nightly  insulin regular (HUMULIN R;NOVOLIN R) injection 10 Units, Once  carvedilol (COREG) tablet 25 mg, QPM  white petrolatum ointment, BID PRN  albuterol (PROVENTIL) nebulizer solution 2.5 mg, Q6H PRN  carvedilol (COREG) tablet 50 mg, QAM  vitamin D (CHOLECALCIFEROL) tablet 2,000 Units, Daily  famotidine (PEPCID) tablet 20 mg, QAM  [Held by provider] hydrALAZINE (APRESOLINE) tablet 25 mg, 3 times per day  insulin lispro (HUMALOG) injection vial 10 Units, TID AC  sodium chloride flush 0.9 % injection 10 mL, 2 times per day  sodium chloride flush 0.9 % injection 10 mL, PRN  0.9 % sodium chloride infusion, PRN  heparin (porcine) injection 5,000 Units, 3 times per day  promethazine (PHENERGAN) tablet 12.5 mg, Q6H PRN   Or  ondansetron (ZOFRAN) injection 4 mg, Q6H PRN  polyethylene glycol (GLYCOLAX) packet 17 g, Daily PRN  acetaminophen (TYLENOL) tablet 650 mg, Q6H PRN   Or  acetaminophen (TYLENOL) suppository 650 mg, Q6H PRN  glucose chewable tablet 16 g, PRN  dextrose bolus 10% 125 mL, PRN   Or  dextrose bolus 10% 250 mL, PRN  glucagon injection 1 mg, PRN  dextrose 10 % infusion, Continuous PRN  vancomycin (VANCOCIN) intermittent dosing (placeholder), RX Placeholder  azithromycin (ZITHROMAX) 500 mg in sodium chloride 0.9 % 250 mL IVPB (Eshg0Ain), Q24H  cefepime (MAXIPIME) 1,000 mg in sodium chloride 0.9 % 50 mL IVPB ([id number]), Q24H  labetalol (NORMODYNE;TRANDATE) injection 10 mg, Q4H PRN  hydrALAZINE (APRESOLINE) injection 10 mg, Q4H PRN      Review of Systems:   Pertinent items are noted in HPI. Physical exam:  Vitals:    02/13/23 0915   BP: (!) 110/59   Pulse: 90   Resp:    Temp:    SpO2:            General: No distress. Alert. Eyes: PERRL. No sclera icterus.  No conjunctival injectio; periorbital edema and facial puffiness  ENT: No discharge. Pharynx clear. Neck: Trachea midline. Normal thyroid. Lungs: No accessory muscle use. No crackles. No wheezing. No rhonchi. CV: Regular rate. Regular rhythm. No murmur or rub. .   Abd: Non-tender. Non-distended. No masses. No organmegaly. Normal bowel sounds. Skin: Warm and dry. No nodule on exposed extremities. No rash on exposed extremities. Ext: No cyanosis, clubbing, 2+ pitting bilateral legs edema; LFA AVF cannulated  Neuro: Awake. Follows commands. Positive pupils/gag/corneals. Normal pain response. Data:   Labs:  Lab Results   Component Value Date     02/13/2023     (L) 02/12/2023     02/11/2023    K 4.5 02/13/2023    K 5.3 (H) 02/12/2023    K 3.6 02/11/2023    CL 94 (L) 02/13/2023    CO2 23 02/13/2023    CO2 25 02/12/2023    CO2 27 02/11/2023    CREATININE 7.5 (H) 02/13/2023    CREATININE 5.8 (H) 02/12/2023    CREATININE 4.3 (H) 02/11/2023    BUN 45 (H) 02/13/2023    BUN 35 (H) 02/12/2023    BUN 17 02/11/2023    GLUCOSE 321 (H) 02/13/2023    GLUCOSE 543 (HH) 02/12/2023    GLUCOSE 733 (HH) 02/12/2023    PHOS 7.8 (H) 02/13/2023    PHOS 6.3 (H) 02/12/2023    PHOS 10.6 (HH) 02/10/2023    WBC 5.8 02/13/2023    WBC 6.0 02/12/2023    WBC 7.4 02/11/2023    HGB 8.9 (L) 02/13/2023    HGB 8.9 (L) 02/12/2023    HGB 8.6 (L) 02/11/2023    HCT 26.3 (L) 02/13/2023    HCT 25.8 (L) 02/12/2023    HCT 25.5 (L) 02/11/2023    MCV 96.0 02/13/2023     02/13/2023         Imaging:  XR CHEST PORTABLE    Result Date: 2/10/2023  EXAMINATION: ONE XRAY VIEW OF THE CHEST 2/10/2023 4:11 pm COMPARISON: 07/28/2022 HISTORY: ORDERING SYSTEM PROVIDED HISTORY: chest pain TECHNOLOGIST PROVIDED HISTORY: Reason for exam:->chest pain What reading provider will be dictating this exam?->CRC FINDINGS: Right lung opacities. There is no effusion or pneumothorax. Stable heart size. The osseous structures are without acute process.      Right lung airspace opacities. Correlate with pneumonia clinically. Assessment/Plans    1. Acute pulmonary edema due to missed hemodialysis treatments  Hemodialysis  Fluid restriction  Mild hyperkalemia      2. Hypertensive urgency  Suspect due to missed doses of BP meds  BP now trending low  Adjust meds    3. Acute respiratory failure due to acute pulmonary edema  Supplemental oxygen  Improved with dialysis    4. Severe hyperphosphatemia  Question if she is taking any phosphate binders  will begin phosphate binder  Monitor levels    5. ESRD HD dependent  Missed 3 last HD treatments due to problems with childcare  Hemodialysis  Fluid restriction  SW at patient's dialysis facility to follow-up on this issue    6. Possible community-acquired pneumonia  Received Vanc  and doxycycline    7.   Uncontrolled diabetes mellitus   yesterday, no DKA, much improved toda7y      D/w Resident    Aishwarya Schmitz MD  9:20 AM  2/13/2023

## 2023-02-13 NOTE — PROGRESS NOTES
CLINICAL PHARMACY NOTE: MEDS TO BEDS    Total # of Prescriptions Filled: 1   The following medications were delivered to the patient:  Mupirocin 2% oint     Additional Documentation:  RN picked up at the pharmacy

## 2023-02-13 NOTE — PLAN OF CARE
Problem: Discharge Planning  Goal: Discharge to home or other facility with appropriate resources  Outcome: Completed     Problem: Safety - Adult  Goal: Free from fall injury  Outcome: Completed     Problem: Skin/Tissue Integrity  Goal: Absence of new skin breakdown  Description: 1. Monitor for areas of redness and/or skin breakdown  2. Assess vascular access sites hourly  3. Every 4-6 hours minimum:  Change oxygen saturation probe site  4. Every 4-6 hours:  If on nasal continuous positive airway pressure, respiratory therapy assess nares and determine need for appliance change or resting period.   Outcome: Completed     Problem: Chronic Conditions and Co-morbidities  Goal: Patient's chronic conditions and co-morbidity symptoms are monitored and maintained or improved  Outcome: Completed     Problem: Pain  Goal: Verbalizes/displays adequate comfort level or baseline comfort level  Outcome: Completed

## 2023-02-13 NOTE — ACP (ADVANCE CARE PLANNING)
Advance Care Planning   Healthcare Decision Maker:    Primary Decision Maker: Nataly Casas - Brother/Sister - [phone]    Secondary Decision Maker: Juan Jose Dunbar - Brother/Sister - [phone]    Supplemental (Other) Decision Maker: Vikram Herman - Other - [phone]    Supplemental (Other) Decision Maker: Kane Marley - Brother/Sister - [phone]    Supplemental (Other) Decision Maker: Rinku Alvarenga - Aunt/Uncle - [phone]    Click here to complete Healthcare Decision Makers including selection of the Healthcare Decision Maker Relationship (ie \"Primary\").

## 2023-02-13 NOTE — PROGRESS NOTES
Pharmacy Consultation Note  (Antibiotic Dosing and Monitoring)    Initial consult date: 2/11/23  Consulting physician/provider: Dr. Chris Dash  Drug: Vancomycin  Indication: HAP; 14 days     Age/  Gender Height Weight IBW  Allergy Information   37 y.o./female 4' 11\" (149.9 cm) 135 lb (61.2 kg)     Ideal body weight: 48.8 kg (107 lb 8.4 oz)  Adjusted ideal body weight: 55.7 kg (122 lb 11.4 oz)   Patient has no known allergies. Renal Function:  Recent Labs     02/11/23  1410 02/12/23  0510 02/13/23  0415   BUN 17 35* 45*   CREATININE 4.3* 5.8* 7.5*         Intake/Output Summary (Last 24 hours) at 2/13/2023 1234  Last data filed at 2/13/2023 0900  Gross per 24 hour   Intake 1080 ml   Output 1 ml   Net 1079 ml         Vancomycin Monitoring:  Trough:  No results for input(s): VANCOTROUGH in the last 72 hours. Random:    Recent Labs     02/12/23  0510 02/13/23  0415   VANCORANDOM 14.4 18.0         Recent Labs     02/10/23  2158   BLOODCULT2 24 Hours no growth          Historical Cultures:  Organism   Date Value Ref Range Status   02/11/2023 MRSA nasal colonization (A)  Final     Recent Labs     02/10/23  2158   BC 24 Hours no growth         Vancomycin Administration Times:    Recent vancomycin administrations                     vancomycin (VANCOCIN) 500 mg in sodium chloride 0.9 % 100 mL IVPB (mg) 500 mg New Bag 02/12/23 0941    vancomycin (VANCOCIN) 1,000 mg in sodium chloride 0.9 % 250 mL IVPB ([id number]) (mg) 1,000 mg New Bag 02/11/23 1204    vancomycin (VANCOCIN) 1,250 mg in sodium chloride 0.9 % 250 mL IVPB (mg) 1,250 mg New Bag 02/11/23 0000                  Assessment:  Patient is a 44 y.o. female who has been initiated on vancomycin  Estimated Creatinine Clearance: 9 mL/min (A) (based on SCr of 7.5 mg/dL (H)). To dose vancomycin, pharmacy will be utilizing dosing based off of levels due to patient requiring hemodialysis  2/13: Patient to receive HD every MWF. HD today.  Pre-HD level is 18 mcg/mL    Plan:  Vancomycin 1000 mg IV x 1 dose  Random level tomorrow AM  Will continue to follow dialysis plans  Pharmacy to follow      Kelsey Curran, Leonela, BCPS, BCCCP 2/13/2023 12:35 PM

## 2023-02-13 NOTE — PROGRESS NOTES
[address] [place] [address] [facility]  Progress Note      C/C : Pneumonia     Denies fever,chills or SOB   On room air , sat 98 %   Afebrile       SUBJECTIVE:  67-year-old female with past medical history of ESRD on HD via AV fistula, DM, HTN, chronic tobacco abuser presented with shortness of breath. She missed to recent dialysis sessions. She was found to be tachypneic and tachycardic. She was started on 5 L nasal cannula. CTA chest showed bilateral patchy opacities consistent with pulmonary edema versus bilateral multifocal pneumonia. Elevated procalcitonin noted. Patient is currently being treated with cefepime, azithromycin and vancomycin. Patient was found to be hypotensive after hemodialysis session today. RRT was called and patient was transferred to MICU. ID is following for sepsis due to pneumonia. Patient is tolerating medications. No reported adverse drug reactions. No nausea, vomiting, diarrhea. Review of systems:  As stated above in the chief complaint, otherwise negative.     Medications:  Scheduled Meds:   insulin lispro  0-16 Units SubCUTAneous TID WC    insulin lispro  0-4 Units SubCUTAneous Nightly    insulin glargine-yfgn  15 Units SubCUTAneous Nightly    insulin regular  10 Units SubCUTAneous Once    cefepime  2,000 mg IntraVENous Once    cefepime        [id number]        carvedilol  25 mg Oral QPM    carvedilol  50 mg Oral QAM    vitamin D  2,000 Units Oral Daily    famotidine  20 mg Oral QAM    [Held by provider] hydrALAZINE  25 mg Oral 3 times per day    insulin lispro  10 Units SubCUTAneous TID AC    sodium chloride flush  10 mL IntraVENous 2 times per day    heparin (porcine)  5,000 Units SubCUTAneous 3 times per day    vancomycin (VANCOCIN) intermittent dosing (placeholder)   Other RX Placeholder    azithromycin  500 mg IntraVENous Q24H    cefepime  1,000 mg IntraVENous Q24H     Continuous Infusions:   sodium chloride      dextrose       PRN Meds:white petrolatum, albuterol, sodium chloride flush, sodium chloride, promethazine **OR** ondansetron, polyethylene glycol, acetaminophen **OR** acetaminophen, glucose, dextrose bolus **OR** dextrose bolus, glucagon (rDNA), dextrose, labetalol, hydrALAZINE    OBJECTIVE:  BP (!) 143/71   Pulse 89   Temp 97.6 °F (36.4 °C) (Temporal)   Resp 16   Ht 4' 11\" (1.499 m)   Wt 145 lb 8.1 oz (66 kg)   SpO2 98%   BMI 29.39 kg/m²   Temp  Av.1 °F (36.7 °C)  Min: 97.4 °F (36.3 °C)  Max: 98.8 °F (37.1 °C)  Constitutional: The patient is awake, alert, and oriented.   Skin: Warm and dry. No rashes were noted. No jaundice.  HEENT: Eyes show round, and reactive pupils. Moist mucous membranes, no ulcerations, no thrush.   Neck: Supple to movements. No lymphadenopathy.   Chest: Bilateral crackles heard.  Cardiovascular: S1 and S2 are rhythmic and regular. No murmurs appreciated.   Abdomen: Positive bowel sounds to auscultation. Benign to palpation. No masses felt. No hepatosplenomegaly.  Genitourinary: Deferred  Extremities: No clubbing, no cyanosis, no edema.  Musculoskeletal: No pain in range of motion of any joints  Neurological: Following commands, no focal neurodeficit.  Lines: peripheral    Laboratory and Tests Review:  Lab Results   Component Value Date    WBC 5.8 2023    WBC 6.0 2023    WBC 7.4 2023    HGB 8.9 (L) 2023    HCT 26.3 (L) 2023    MCV 96.0 2023     2023     Lab Results   Component Value Date    NEUTROABS 3.55 2023    NEUTROABS 5.34 2023    NEUTROABS 5.80 2023     No results found for: [facility]  Lab Results   Component Value Date    ALT 24 2023    AST 21 2023    ALKPHOS 124 (H) 2023    BILITOT 0.6 2023     Lab Results   Component Value Date/Time     2023 04:15 AM    K 4.5 2023 04:15 AM    K 4.4 2023 04:47 AM    CL 94 2023 04:15 AM    CO2 23 2023 04:15 AM    BUN 45 2023 04:15 AM    CREATININE 7.5  02/13/2023 04:15 AM    CREATININE 5.8 02/12/2023 05:10 AM    CREATININE 4.3 02/11/2023 02:10 PM    GFRAA 5 07/29/2022 01:14 AM    LABGLOM 7 02/13/2023 04:15 AM    GLUCOSE 321 02/13/2023 04:15 AM    GLUCOSE 279 07/27/2011 04:40 PM    PROT 6.7 02/11/2023 02:10 PM    LABALBU 3.3 02/11/2023 02:10 PM    LABALBU 3.9 01/10/2011 01:40 PM    CALCIUM 9.4 02/13/2023 04:15 AM    BILITOT 0.6 02/11/2023 02:10 PM    ALKPHOS 124 02/11/2023 02:10 PM    AST 21 02/11/2023 02:10 PM    ALT 24 02/11/2023 02:10 PM     Lab Results   Component Value Date    CRP 6.7 (H) 02/11/2023    CRP 0.9 (H) 03/18/2022    CRP 0.1 11/05/2019     Lab Results   Component Value Date    SEDRATE 38 (H) 02/11/2023    SEDRATE 29 (H) 03/18/2022    SEDRATE 83 (H) 11/28/2018     Radiology:      Microbiology:   Lab Results   Component Value Date/Time    BC 24 Hours no growth 02/10/2023 09:58 PM    BC 5 Days no growth 03/17/2022 12:00 PM    BC 5 Days no growth 05/19/2021 01:26 PM    ORG MRSA nasal colonization 02/11/2023 03:02 PM    ORG Anaerobic gram negative sharon 05/19/2021 06:21 PM    ORG Staphylococcus epidermidis 05/19/2021 06:21 PM    ORG Corynebacterium species 05/19/2021 06:21 PM     Lab Results   Component Value Date/Time    BLOODCULT2 24 Hours no growth 02/10/2023 09:58 PM    BLOODCULT2 5 Days no growth 03/17/2022 12:00 PM    BLOODCULT2 5 Days no growth 05/19/2021 06:24 PM    ORG MRSA nasal colonization 02/11/2023 03:02 PM    ORG Anaerobic gram negative sharon 05/19/2021 06:21 PM    ORG Staphylococcus epidermidis 05/19/2021 06:21 PM    ORG Corynebacterium species 05/19/2021 06:21 PM     WOUND/ABSCESS   Date Value Ref Range Status   05/19/2021 Moderate growth  Final   05/19/2021 Light growth  Final   01/27/2021 Heavy growth  Final   01/27/2021 Moderate growth  Final     Smear, Respiratory   Date Value Ref Range Status   11/26/2018   Final    Group 5: >25 PMN's/LPF and <10 Epithelial cells/LPF  Abundant Polymorphonuclear leukocytes  Few Epithelial cells  Few gram positive rods Diphtheroid-like  Rare Gram negative rods  Few Gram positive diplococci  Few Gram positive cocci in clusters           Component Value Date/Time    LABFUNG 4 Weeks- no fungus isolated 08/19/2016 1735     CULTURE, RESPIRATORY   Date Value Ref Range Status   11/26/2018 Oral Pharyngeal Judy present  Final     No results found for: CXCATHTIP  Body Fluid Culture, Sterile   Date Value Ref Range Status   08/19/2016 Growth not present  Final     Culture Surgical   Date Value Ref Range Status   01/29/2021 Light growth  Final   01/29/2021 Moderate growth  (second morphology)    Final   01/29/2021 Moderate growth  (third morphology)    Final     Urine Culture, Routine   Date Value Ref Range Status   01/27/2017   Final    <10,000 CFU/mL  Gram negative rods  Gram positive cocci     08/16/2016   Final    ,000 CFU/mL  Mixed judy isolated. Further workup and sensitivity testing  is not routinely indicated and will not be performed. Mixed judy isolated includes:  Mixed gram positive organisms       MRSA Culture Only   Date Value Ref Range Status   11/26/2018 Methicillin resistant Staph aureus not isolated  Final   09/01/2016 Methicillin resistant Staph aureus not isolated  Final       Assessment:  ? Pneumonia ( elevated procalcitonin ) - chest x ray - significant improvement . ESRD on HD  Acute hypoxic respiratory failure- improved   Elevated procalcitonin      Plan:    Cefepime 2 gram X 1 today, d/c on oral zyvox 600 mg po q 12 hrs for 5 days   .     Rama Lee MD  11:39 AM  2/13/2023

## 2023-02-13 NOTE — CARE COORDINATION
Care Coordination: LOS 3. To ed sob/ missed 3 HD treatments. Pt is T TH S. bp 164/112. room air 78%, placed on 02- admit ICU. Renal following. Ac pulm edema, hypertensive urgency. Currently room air, Zithromax, Maxpime, poss pneumonia. Has orders to transfer 2/12/23 /and dc order from today. Met with pt at bedside to discuss transition of care upon discharge. Lives with Disabled daughter with CP. 1 story home, Nebulizer from [facility]. Hx of [address] [place] [address]. No hx of [facility]. Daughter has private care givers at home and is covered through SSA/Medicaid. She is calling  to increase hours for daughter. Daughter is at school until 4 pm. HD T [facility] AT [place] [address] Sat 1030-3. Daughter out of school at 4 pm. She does not anticipate any needs at discharge.  Pt drove here and has her car for transport, follows with Horace Tang and uses [facility]

## 2023-02-14 ENCOUNTER — CARE COORDINATION (OUTPATIENT)
Dept: CASE MANAGEMENT | Age: 38
End: 2023-02-14

## 2023-02-14 LAB
EKG ATRIAL RATE: 122 BPM
EKG P AXIS: 60 DEGREES
EKG P-R INTERVAL: 112 MS
EKG Q-T INTERVAL: 314 MS
EKG QRS DURATION: 70 MS
EKG QTC CALCULATION (BAZETT): 447 MS
EKG R AXIS: 19 DEGREES
EKG T AXIS: 93 DEGREES
EKG VENTRICULAR RATE: 122 BPM

## 2023-02-14 PROCEDURE — 93010 ELECTROCARDIOGRAM REPORT: CPT | Performed by: INTERNAL MEDICINE

## 2023-02-14 NOTE — CARE COORDINATION
[facility] Care Transitions Initial Follow Up Call    Call within 2 business days of discharge: Yes      Patient: Leidy Leon Patient : 1985   MRN: [id number]  Reason for Admission: Acute respiratory failure with hypoxia   Discharge Date: 23 RARS: Readmission Risk Score: 19.6      Last Discharge  [address]       Date Complaint Diagnosis Description Type Department Provider    2/10/23 Shortness of Breath Acute respiratory failure with hypoxia ([address] . .. ED to Hosp-Admission (Discharged) (ADMITTED) HOMA Khalil MD; Melva Cassidy DO; . .. Attempted to reach the patient for initial Care Transition call post hospital discharge. Message left with CTN's contact information requesting return phone call. Will attempt outreach again tomorrow.       Follow Up  Future Appointments   Date Time Provider Titus Potter   3/1/2023 11:30 AM [address] [place] [address]   2023  2:00 PM Brennan Carty MD [facility]       Reanna Landon RN

## 2023-02-14 NOTE — PROGRESS NOTES
Physician Progress Note      PATIENT:               Saadia Hernandez  CSN #:                  [id number]  :                       1985  ADMIT DATE:       2/10/2023 3:45 PM  Nas Hardin [place] DATE:        2023 3:44 PM  RESPONDING  PROVIDER #:        Vik Wheat MD          QUERY TEXT:    Pt admitted with Pneumonia and Acute respiratory failure with hypoxia. Noted   documentation of Sepsis per Infectious Disease and Septic shock per critical   care consultant note. If possible, please document in progress notes and   discharge summary:    The medical record reflects the following:  Risk Factors: Pneumonia; ESRD on HD; Diabetes; Non compliance with dialysis  Clinical Indicators:  23 Per infectious Disease consult: Sepsis due to bilateral multifocal   pneumonia. Continue Cefepime; Azithromycin and vancomycin. Send blood and   respiratory cultures  23: Per Critical care: septic Vs hypovolemic shock./  BP 85/45 during   RRT; Pro calcitonin trending up 5.48>>8.29; 1 liter NS bolus; Antibiotics per   ID recommendations  2/10/23: Procalcitonin 5.49; WBC: 6.6; Heart rate 105-113; Afebrile  23: CRP 6.7;  WBC: 6.7; Procalcitonin 8.29; Heart rate 105-126; Temp   99.0;  BP 85/46;   86/51;  23: Temp 99.0; Treatment: Infectious Disease and critical care consults; Blood and   respiratory cultures; laboratory studies; Cefepime IV; Vancomycin IV;   Azithromycin IV    Thank You,  Franchesca ARIASN, R.N.  Clinical Documentation Improvement  [phone]  Options provided:  -- Sepsis with septic shock present as evidenced by, Please document evidence.   -- Sepsis with septic shock was ruled out after study  -- Other - I will add my own diagnosis  -- Disagree - Not applicable / Not valid  -- Disagree - Clinically unable to determine / Unknown  -- Refer to Clinical Documentation Reviewer    PROVIDER RESPONSE TEXT:    Sepsis with septic shock is present as evidenced by Hypotension, elevated   procalcitonin levels, tachycardia, need for IV antibiotics.     Query created by: Monster Christian on 2/13/2023 10:10 AM      Electronically signed by:  Deepti Vora MD 2/14/2023 9:40 AM

## 2023-02-15 ENCOUNTER — CARE COORDINATION (OUTPATIENT)
Dept: CASE MANAGEMENT | Age: 38
End: 2023-02-15

## 2023-02-15 LAB
BLOOD CULTURE, ROUTINE: NORMAL
CULTURE, BLOOD 2: NORMAL

## 2023-02-15 NOTE — CARE COORDINATION
[facility] Care Transitions Initial Follow Up Call    Call within 2 business days of discharge: Yes      Patient: Frankie Ng Patient : 1985   MRN: [id number]  Reason for Admission: Acute respiratory failure with hypoxia   Discharge Date: 23 RARS: Readmission Risk Score: 19.6      Last Discharge  Street       Date Complaint Diagnosis Description Type Department Provider    2/10/23 Shortness of Breath Acute respiratory failure with hypoxia ([address] . .. ED to Hosp-Admission (Discharged) (ADMITTED) HOMA [facility] Melodie Gonzalez MD; Allie Sarmiento DO; . .. 2nd attempt to reach the patient for initial Care Transition call post hospital discharge. Message left with CTN's contact information requesting return phone call. No further outreaches will be attempted. If no return call by the end of today, CTN will  sign off and resolve CT episode    Noted pt has a scheduled HFU with her pcp on 23    Per chart review the patient is  active on [facility], CTN will send unable to reach letter in [facility].       Follow Up  Future Appointments   Date Time Provider Titus Potter   2023  8:45 AM Nate Malcolm  [address]   3/1/2023 11:30 AM Nate Malcolm  [address]   2023  2:00 PM Brennan Barker MD [facility]           Radha Wilcox RN

## 2023-03-22 ENCOUNTER — APPOINTMENT (OUTPATIENT)
Dept: GENERAL RADIOLOGY | Age: 38
DRG: 177 | End: 2023-03-22
Payer: MEDICARE

## 2023-03-22 ENCOUNTER — HOSPITAL ENCOUNTER (INPATIENT)
Age: 38
LOS: 3 days | Discharge: HOME | DRG: 177 | End: 2023-03-25
Attending: EMERGENCY MEDICINE | Admitting: INTERNAL MEDICINE
Payer: MEDICARE

## 2023-03-22 DIAGNOSIS — N18.6 ESRD (END STAGE RENAL DISEASE) ON DIALYSIS (HCC): ICD-10-CM

## 2023-03-22 DIAGNOSIS — J81.0 ACUTE PULMONARY EDEMA (HCC): ICD-10-CM

## 2023-03-22 DIAGNOSIS — Z99.2 ESRD (END STAGE RENAL DISEASE) ON DIALYSIS (HCC): ICD-10-CM

## 2023-03-22 DIAGNOSIS — U07.1 COVID-19: ICD-10-CM

## 2023-03-22 DIAGNOSIS — J96.01 ACUTE RESPIRATORY FAILURE WITH HYPOXIA (HCC): Primary | ICD-10-CM

## 2023-03-22 DIAGNOSIS — E87.70 HYPERVOLEMIA, UNSPECIFIED HYPERVOLEMIA TYPE: ICD-10-CM

## 2023-03-22 DIAGNOSIS — E87.5 HYPERKALEMIA: ICD-10-CM

## 2023-03-22 PROBLEM — J96.00 ACUTE RESPIRATORY FAILURE DUE TO COVID-19 (HCC): Status: ACTIVE | Noted: 2023-03-22

## 2023-03-22 PROBLEM — R07.89 CHEST PRESSURE: Status: ACTIVE | Noted: 2023-03-22

## 2023-03-22 PROBLEM — J96.00 ACUTE RESPIRATORY FAILURE DUE TO COVID-19 (HCC): Status: RESOLVED | Noted: 2023-03-22 | Resolved: 2023-03-22

## 2023-03-22 PROBLEM — R65.10 SIRS (SYSTEMIC INFLAMMATORY RESPONSE SYNDROME) (HCC): Status: ACTIVE | Noted: 2023-03-22

## 2023-03-22 PROBLEM — R79.89 ELEVATED PROCALCITONIN: Status: ACTIVE | Noted: 2023-03-22

## 2023-03-22 LAB
ALBUMIN SERPL-MCNC: 3.8 G/DL (ref 3.5–5.2)
ALP SERPL-CCNC: 111 U/L (ref 35–104)
ALT SERPL-CCNC: 17 U/L (ref 0–32)
ANION GAP SERPL CALCULATED.3IONS-SCNC: 20 MMOL/L (ref 7–16)
ANION GAP SERPL CALCULATED.3IONS-SCNC: 24 MMOL/L (ref 7–16)
ANION GAP SERPL CALCULATED.3IONS-SCNC: 28 MMOL/L (ref 7–16)
AST SERPL-CCNC: 33 U/L (ref 0–31)
B.E.: -4.9 MMOL/L (ref -3–3)
BASOPHILS # BLD: 0.04 E9/L (ref 0–0.2)
BASOPHILS NFR BLD: 0.7 % (ref 0–2)
BILIRUB SERPL-MCNC: 1.2 MG/DL (ref 0–1.2)
BNP BLD-MCNC: ABNORMAL PG/ML (ref 0–125)
BUN SERPL-MCNC: 27 MG/DL (ref 6–20)
BUN SERPL-MCNC: 31 MG/DL (ref 6–20)
BUN SERPL-MCNC: 80 MG/DL (ref 6–20)
CALCIUM SERPL-MCNC: 9.3 MG/DL (ref 8.6–10.2)
CALCIUM SERPL-MCNC: 9.3 MG/DL (ref 8.6–10.2)
CALCIUM SERPL-MCNC: 9.4 MG/DL (ref 8.6–10.2)
CHLORIDE SERPL-SCNC: 85 MMOL/L (ref 98–107)
CHLORIDE SERPL-SCNC: 90 MMOL/L (ref 98–107)
CHLORIDE SERPL-SCNC: 95 MMOL/L (ref 98–107)
CO2 SERPL-SCNC: 20 MMOL/L (ref 22–29)
CO2 SERPL-SCNC: 21 MMOL/L (ref 22–29)
CO2 SERPL-SCNC: 22 MMOL/L (ref 22–29)
COHB: 2 % (ref 0–1.5)
CREAT SERPL-MCNC: 17.7 MG/DL (ref 0.5–1)
CREAT SERPL-MCNC: 7.1 MG/DL (ref 0.5–1)
CREAT SERPL-MCNC: 7.6 MG/DL (ref 0.5–1)
CRITICAL: ABNORMAL
CRP SERPL HS-MCNC: 0.9 MG/DL (ref 0–0.4)
DATE ANALYZED: ABNORMAL
DATE OF COLLECTION: ABNORMAL
EKG ATRIAL RATE: 108 BPM
EKG P AXIS: 55 DEGREES
EKG P-R INTERVAL: 136 MS
EKG Q-T INTERVAL: 336 MS
EKG QRS DURATION: 60 MS
EKG QTC CALCULATION (BAZETT): 450 MS
EKG R AXIS: 16 DEGREES
EKG T AXIS: 41 DEGREES
EKG VENTRICULAR RATE: 108 BPM
EOSINOPHIL # BLD: 0.16 E9/L (ref 0.05–0.5)
EOSINOPHIL NFR BLD: 2.8 % (ref 0–6)
ERYTHROCYTE [DISTWIDTH] IN BLOOD BY AUTOMATED COUNT: 16.8 FL (ref 11.5–15)
GLUCOSE BLD-MCNC: 199 MG/DL (ref 74–99)
GLUCOSE SERPL-MCNC: 214 MG/DL (ref 74–99)
GLUCOSE SERPL-MCNC: 218 MG/DL (ref 74–99)
GLUCOSE SERPL-MCNC: 269 MG/DL (ref 74–99)
HCO3: 20.1 MMOL/L (ref 22–26)
HCT VFR BLD AUTO: 27 % (ref 34–48)
HGB BLD-MCNC: 8.7 G/DL (ref 11.5–15.5)
HHB: 9.6 % (ref 0–5)
IMM GRANULOCYTES # BLD: 0.03 E9/L
IMM GRANULOCYTES NFR BLD: 0.5 % (ref 0–5)
INFLUENZA A BY PCR: NOT DETECTED
INFLUENZA B BY PCR: NOT DETECTED
LAB: ABNORMAL
LACTATE BLDV-SCNC: 1.4 MMOL/L (ref 0.5–1.9)
LACTATE BLDV-SCNC: 2.5 MMOL/L (ref 0.5–1.9)
LYMPHOCYTES # BLD: 1.11 E9/L (ref 1.5–4)
LYMPHOCYTES NFR BLD: 19.3 % (ref 20–42)
Lab: ABNORMAL
MCH RBC QN AUTO: 31.6 PG (ref 26–35)
MCHC RBC AUTO-ENTMCNC: 32.2 % (ref 32–34.5)
MCV RBC AUTO: 98.2 FL (ref 80–99.9)
METER GLUCOSE: 249 MG/DL (ref 74–99)
METHB: 0.4 % (ref 0–1.5)
MODE: ABNORMAL
MONOCYTES # BLD: 0.68 E9/L (ref 0.1–0.95)
MONOCYTES NFR BLD: 11.8 % (ref 2–12)
NEUTROPHILS # BLD: 3.74 E9/L (ref 1.8–7.3)
NEUTS SEG NFR BLD: 64.9 % (ref 43–80)
O2 CONTENT: 11.7 ML/DL
O2 SATURATION: 90.2 % (ref 92–98.5)
O2HB: 88 % (ref 94–97)
OPERATOR ID: 2856
PATIENT TEMP: 37 C
PCO2: 36.7 MMHG (ref 35–45)
PERFORMED ON: ABNORMAL
PH BLOOD GAS: 7.36 (ref 7.35–7.45)
PLATELET # BLD AUTO: 191 E9/L (ref 130–450)
PMV BLD AUTO: 9.6 FL (ref 7–12)
PO2: 70.1 MMHG (ref 75–100)
POC CHLORIDE: 103 MMOL/L (ref 100–108)
POC CREATININE: 13.8 MG/DL (ref 0.5–1)
POC SODIUM: 136 MMOL/L (ref 132–146)
POTASSIUM SERPL-SCNC: 4.4 MMOL/L (ref 3.5–5)
POTASSIUM SERPL-SCNC: 4.4 MMOL/L (ref 3.5–5)
POTASSIUM SERPL-SCNC: 5.1 MMOL/L (ref 3.5–5)
POTASSIUM SERPL-SCNC: 5.2 MMOL/L (ref 3.5–5)
PROCALCITONIN: 1.18 NG/ML (ref 0–0.08)
PROT SERPL-MCNC: 7.7 G/DL (ref 6.4–8.3)
RBC # BLD AUTO: 2.75 E12/L (ref 3.5–5.5)
SARS-COV-2 RDRP RESP QL NAA+PROBE: DETECTED
SODIUM SERPL-SCNC: 133 MMOL/L (ref 132–146)
SODIUM SERPL-SCNC: 135 MMOL/L (ref 132–146)
SODIUM SERPL-SCNC: 137 MMOL/L (ref 132–146)
SOURCE, BLOOD GAS: ABNORMAL
THB: 9.4 G/DL (ref 11.5–16.5)
TIME ANALYZED: 1247
TROPONIN, HIGH SENSITIVITY: 226 NG/L (ref 0–9)
TROPONIN, HIGH SENSITIVITY: 232 NG/L (ref 0–9)
TROPONIN, HIGH SENSITIVITY: 233 NG/L (ref 0–9)
WBC # BLD: 5.8 E9/L (ref 4.5–11.5)

## 2023-03-22 PROCEDURE — 80048 BASIC METABOLIC PNL TOTAL CA: CPT

## 2023-03-22 PROCEDURE — 5A1D70Z PERFORMANCE OF URINARY FILTRATION, INTERMITTENT, LESS THAN 6 HOURS PER DAY: ICD-10-PCS | Performed by: INTERNAL MEDICINE

## 2023-03-22 PROCEDURE — 71045 X-RAY EXAM CHEST 1 VIEW: CPT

## 2023-03-22 PROCEDURE — 2580000003 HC RX 258: Performed by: INTERNAL MEDICINE

## 2023-03-22 PROCEDURE — 84484 ASSAY OF TROPONIN QUANT: CPT

## 2023-03-22 PROCEDURE — 87635 SARS-COV-2 COVID-19 AMP PRB: CPT

## 2023-03-22 PROCEDURE — 6370000000 HC RX 637 (ALT 250 FOR IP): Performed by: INTERNAL MEDICINE

## 2023-03-22 PROCEDURE — 96374 THER/PROPH/DIAG INJ IV PUSH: CPT

## 2023-03-22 PROCEDURE — 84145 PROCALCITONIN (PCT): CPT

## 2023-03-22 PROCEDURE — 6360000002 HC RX W HCPCS

## 2023-03-22 PROCEDURE — 6360000002 HC RX W HCPCS: Performed by: EMERGENCY MEDICINE

## 2023-03-22 PROCEDURE — 6360000002 HC RX W HCPCS: Performed by: INTERNAL MEDICINE

## 2023-03-22 PROCEDURE — 2500000003 HC RX 250 WO HCPCS: Performed by: EMERGENCY MEDICINE

## 2023-03-22 PROCEDURE — 87502 INFLUENZA DNA AMP PROBE: CPT

## 2023-03-22 PROCEDURE — 83880 ASSAY OF NATRIURETIC PEPTIDE: CPT

## 2023-03-22 PROCEDURE — 2060000000 HC ICU INTERMEDIATE R&B

## 2023-03-22 PROCEDURE — 80053 COMPREHEN METABOLIC PANEL: CPT

## 2023-03-22 PROCEDURE — 94660 CPAP INITIATION&MGMT: CPT

## 2023-03-22 PROCEDURE — 82805 BLOOD GASES W/O2 SATURATION: CPT

## 2023-03-22 PROCEDURE — 99285 EMERGENCY DEPT VISIT HI MDM: CPT

## 2023-03-22 PROCEDURE — 96375 TX/PRO/DX INJ NEW DRUG ADDON: CPT

## 2023-03-22 PROCEDURE — 3E0333Z INTRODUCTION OF ANTI-INFLAMMATORY INTO PERIPHERAL VEIN, PERCUTANEOUS APPROACH: ICD-10-PCS | Performed by: INTERNAL MEDICINE

## 2023-03-22 PROCEDURE — 87081 CULTURE SCREEN ONLY: CPT

## 2023-03-22 PROCEDURE — 82435 ASSAY OF BLOOD CHLORIDE: CPT

## 2023-03-22 PROCEDURE — 93005 ELECTROCARDIOGRAM TRACING: CPT

## 2023-03-22 PROCEDURE — 2500000003 HC RX 250 WO HCPCS: Performed by: INTERNAL MEDICINE

## 2023-03-22 PROCEDURE — 87040 BLOOD CULTURE FOR BACTERIA: CPT

## 2023-03-22 PROCEDURE — 86140 C-REACTIVE PROTEIN: CPT

## 2023-03-22 PROCEDURE — 84132 ASSAY OF SERUM POTASSIUM: CPT

## 2023-03-22 PROCEDURE — 82962 GLUCOSE BLOOD TEST: CPT

## 2023-03-22 PROCEDURE — 2580000003 HC RX 258: Performed by: EMERGENCY MEDICINE

## 2023-03-22 PROCEDURE — 82565 ASSAY OF CREATININE: CPT

## 2023-03-22 PROCEDURE — 83605 ASSAY OF LACTIC ACID: CPT

## 2023-03-22 PROCEDURE — S5553 INSULIN LONG ACTING 5 U: HCPCS | Performed by: INTERNAL MEDICINE

## 2023-03-22 PROCEDURE — 84295 ASSAY OF SERUM SODIUM: CPT

## 2023-03-22 PROCEDURE — 36415 COLL VENOUS BLD VENIPUNCTURE: CPT

## 2023-03-22 PROCEDURE — 90935 HEMODIALYSIS ONE EVALUATION: CPT

## 2023-03-22 PROCEDURE — 93010 ELECTROCARDIOGRAM REPORT: CPT | Performed by: INTERNAL MEDICINE

## 2023-03-22 PROCEDURE — 85025 COMPLETE CBC W/AUTO DIFF WBC: CPT

## 2023-03-22 PROCEDURE — 82947 ASSAY GLUCOSE BLOOD QUANT: CPT

## 2023-03-22 RX ORDER — CALCIUM ACETATE 667 MG/1
4 CAPSULE ORAL
Status: DISCONTINUED | OUTPATIENT
Start: 2023-03-22 | End: 2023-03-25 | Stop reason: HOSPADM

## 2023-03-22 RX ORDER — ACETAMINOPHEN 325 MG/1
650 TABLET ORAL EVERY 6 HOURS PRN
Status: DISCONTINUED | OUTPATIENT
Start: 2023-03-22 | End: 2023-03-23

## 2023-03-22 RX ORDER — FOLIC ACID/VIT B COMPLEX AND C 0.8 MG
1 TABLET ORAL DAILY
COMMUNITY

## 2023-03-22 RX ORDER — NEPHROCAP 1 MG
1 CAP ORAL DAILY
Status: DISCONTINUED | OUTPATIENT
Start: 2023-03-23 | End: 2023-03-25 | Stop reason: HOSPADM

## 2023-03-22 RX ORDER — INSULIN LISPRO 100 [IU]/ML
0-4 INJECTION, SOLUTION INTRAVENOUS; SUBCUTANEOUS NIGHTLY
Status: DISCONTINUED | OUTPATIENT
Start: 2023-03-22 | End: 2023-03-24

## 2023-03-22 RX ORDER — DEXAMETHASONE SODIUM PHOSPHATE 4 MG/ML
10 INJECTION, SOLUTION INTRA-ARTICULAR; INTRALESIONAL; INTRAMUSCULAR; INTRAVENOUS; SOFT TISSUE ONCE
Status: COMPLETED | OUTPATIENT
Start: 2023-03-22 | End: 2023-03-22

## 2023-03-22 RX ORDER — SODIUM CHLORIDE 9 MG/ML
INJECTION, SOLUTION INTRAVENOUS PRN
Status: DISCONTINUED | OUTPATIENT
Start: 2023-03-22 | End: 2023-03-25 | Stop reason: HOSPADM

## 2023-03-22 RX ORDER — ACETAMINOPHEN 500 MG
500 TABLET ORAL EVERY 6 HOURS PRN
Status: ON HOLD | COMMUNITY
End: 2023-03-24

## 2023-03-22 RX ORDER — CARVEDILOL 25 MG/1
25 TABLET ORAL NIGHTLY
Status: DISCONTINUED | OUTPATIENT
Start: 2023-03-22 | End: 2023-03-25 | Stop reason: HOSPADM

## 2023-03-22 RX ORDER — ONDANSETRON 2 MG/ML
4 INJECTION INTRAMUSCULAR; INTRAVENOUS EVERY 6 HOURS PRN
Status: DISCONTINUED | OUTPATIENT
Start: 2023-03-22 | End: 2023-03-25 | Stop reason: HOSPADM

## 2023-03-22 RX ORDER — HYDRALAZINE HYDROCHLORIDE 20 MG/ML
10 INJECTION INTRAMUSCULAR; INTRAVENOUS ONCE
Status: COMPLETED | OUTPATIENT
Start: 2023-03-22 | End: 2023-03-22

## 2023-03-22 RX ORDER — FAMOTIDINE 20 MG/1
20 TABLET, FILM COATED ORAL EVERY MORNING
Status: DISCONTINUED | OUTPATIENT
Start: 2023-03-23 | End: 2023-03-25 | Stop reason: HOSPADM

## 2023-03-22 RX ORDER — INSULIN LISPRO 100 [IU]/ML
10 INJECTION, SOLUTION INTRAVENOUS; SUBCUTANEOUS
Status: DISCONTINUED | OUTPATIENT
Start: 2023-03-22 | End: 2023-03-25 | Stop reason: HOSPADM

## 2023-03-22 RX ORDER — SODIUM CHLORIDE 0.9 % (FLUSH) 0.9 %
5-40 SYRINGE (ML) INJECTION PRN
Status: DISCONTINUED | OUTPATIENT
Start: 2023-03-22 | End: 2023-03-25 | Stop reason: HOSPADM

## 2023-03-22 RX ORDER — INSULIN LISPRO 100 [IU]/ML
0-8 INJECTION, SOLUTION INTRAVENOUS; SUBCUTANEOUS
Status: DISCONTINUED | OUTPATIENT
Start: 2023-03-22 | End: 2023-03-24

## 2023-03-22 RX ORDER — CARVEDILOL 25 MG/1
25 TABLET ORAL NIGHTLY
COMMUNITY

## 2023-03-22 RX ORDER — HYDRALAZINE HYDROCHLORIDE 50 MG/1
50 TABLET, FILM COATED ORAL EVERY 8 HOURS SCHEDULED
Status: DISCONTINUED | OUTPATIENT
Start: 2023-03-22 | End: 2023-03-25 | Stop reason: HOSPADM

## 2023-03-22 RX ORDER — INSULIN GLARGINE-YFGN 100 [IU]/ML
20 INJECTION, SOLUTION SUBCUTANEOUS NIGHTLY
Status: DISCONTINUED | OUTPATIENT
Start: 2023-03-22 | End: 2023-03-23

## 2023-03-22 RX ORDER — CALCIUM GLUCONATE 10 MG/ML
1000 INJECTION, SOLUTION INTRAVENOUS ONCE
Status: COMPLETED | OUTPATIENT
Start: 2023-03-22 | End: 2023-03-22

## 2023-03-22 RX ORDER — INSULIN LISPRO 100 [IU]/ML
10 INJECTION, SOLUTION INTRAVENOUS; SUBCUTANEOUS
COMMUNITY
End: 2023-03-31

## 2023-03-22 RX ORDER — ONDANSETRON 4 MG/1
4 TABLET, ORALLY DISINTEGRATING ORAL EVERY 8 HOURS PRN
Status: DISCONTINUED | OUTPATIENT
Start: 2023-03-22 | End: 2023-03-25 | Stop reason: HOSPADM

## 2023-03-22 RX ORDER — CALCIUM ACETATE 667 MG/1
4 CAPSULE ORAL
COMMUNITY

## 2023-03-22 RX ORDER — SODIUM CHLORIDE 0.9 % (FLUSH) 0.9 %
5-40 SYRINGE (ML) INJECTION EVERY 12 HOURS SCHEDULED
Status: DISCONTINUED | OUTPATIENT
Start: 2023-03-22 | End: 2023-03-25 | Stop reason: HOSPADM

## 2023-03-22 RX ORDER — CARVEDILOL 25 MG/1
50 TABLET ORAL EVERY MORNING
Status: DISCONTINUED | OUTPATIENT
Start: 2023-03-23 | End: 2023-03-25 | Stop reason: HOSPADM

## 2023-03-22 RX ORDER — ACETAMINOPHEN 650 MG/1
650 SUPPOSITORY RECTAL EVERY 6 HOURS PRN
Status: DISCONTINUED | OUTPATIENT
Start: 2023-03-22 | End: 2023-03-23

## 2023-03-22 RX ORDER — FUROSEMIDE 10 MG/ML
60 INJECTION INTRAMUSCULAR; INTRAVENOUS ONCE
Status: COMPLETED | OUTPATIENT
Start: 2023-03-22 | End: 2023-03-22

## 2023-03-22 RX ORDER — ALBUTEROL SULFATE 2.5 MG/3ML
2.5 SOLUTION RESPIRATORY (INHALATION) EVERY 4 HOURS PRN
Status: DISCONTINUED | OUTPATIENT
Start: 2023-03-22 | End: 2023-03-25 | Stop reason: HOSPADM

## 2023-03-22 RX ORDER — DEXTROSE MONOHYDRATE 100 MG/ML
INJECTION, SOLUTION INTRAVENOUS CONTINUOUS PRN
Status: DISCONTINUED | OUTPATIENT
Start: 2023-03-22 | End: 2023-03-25 | Stop reason: HOSPADM

## 2023-03-22 RX ORDER — LABETALOL HYDROCHLORIDE 5 MG/ML
10 INJECTION, SOLUTION INTRAVENOUS ONCE
Status: COMPLETED | OUTPATIENT
Start: 2023-03-22 | End: 2023-03-22

## 2023-03-22 RX ORDER — POLYETHYLENE GLYCOL 3350 17 G/17G
17 POWDER, FOR SOLUTION ORAL DAILY PRN
Status: DISCONTINUED | OUTPATIENT
Start: 2023-03-22 | End: 2023-03-25 | Stop reason: HOSPADM

## 2023-03-22 RX ORDER — DEXAMETHASONE SODIUM PHOSPHATE 4 MG/ML
6 INJECTION, SOLUTION INTRA-ARTICULAR; INTRALESIONAL; INTRAMUSCULAR; INTRAVENOUS; SOFT TISSUE EVERY 24 HOURS
Status: DISCONTINUED | OUTPATIENT
Start: 2023-03-23 | End: 2023-03-23

## 2023-03-22 RX ORDER — HEPARIN SODIUM 10000 [USP'U]/ML
5000 INJECTION, SOLUTION INTRAVENOUS; SUBCUTANEOUS EVERY 12 HOURS
Status: DISCONTINUED | OUTPATIENT
Start: 2023-03-22 | End: 2023-03-25 | Stop reason: HOSPADM

## 2023-03-22 RX ADMIN — CEFEPIME 2000 MG: 2 INJECTION, POWDER, FOR SOLUTION INTRAVENOUS at 23:27

## 2023-03-22 RX ADMIN — DEXAMETHASONE SODIUM PHOSPHATE 10 MG: 4 INJECTION, SOLUTION INTRAMUSCULAR; INTRAVENOUS at 13:48

## 2023-03-22 RX ADMIN — CEFTRIAXONE SODIUM 2000 MG: 2 INJECTION, POWDER, FOR SOLUTION INTRAMUSCULAR; INTRAVENOUS at 14:00

## 2023-03-22 RX ADMIN — FUROSEMIDE 60 MG: 10 INJECTION, SOLUTION INTRAMUSCULAR; INTRAVENOUS at 14:00

## 2023-03-22 RX ADMIN — CALCIUM ACETATE 2668 MG: 667 CAPSULE ORAL at 23:06

## 2023-03-22 RX ADMIN — CARVEDILOL 25 MG: 25 TABLET, FILM COATED ORAL at 23:06

## 2023-03-22 RX ADMIN — INSULIN GLARGINE-YFGN 20 UNITS: 100 INJECTION, SOLUTION SUBCUTANEOUS at 23:18

## 2023-03-22 RX ADMIN — SODIUM CHLORIDE, PRESERVATIVE FREE 10 ML: 5 INJECTION INTRAVENOUS at 23:41

## 2023-03-22 RX ADMIN — HYDRALAZINE HYDROCHLORIDE 10 MG: 20 INJECTION INTRAMUSCULAR; INTRAVENOUS at 12:46

## 2023-03-22 RX ADMIN — LABETALOL HYDROCHLORIDE 10 MG: 5 INJECTION INTRAVENOUS at 12:48

## 2023-03-22 RX ADMIN — CALCIUM GLUCONATE 1000 MG: 10 INJECTION, SOLUTION INTRAVENOUS at 12:37

## 2023-03-22 RX ADMIN — HYDRALAZINE HYDROCHLORIDE 50 MG: 50 TABLET, FILM COATED ORAL at 23:06

## 2023-03-22 RX ADMIN — HEPARIN SODIUM 5000 UNITS: 10000 INJECTION INTRAVENOUS; SUBCUTANEOUS at 23:18

## 2023-03-22 ASSESSMENT — ENCOUNTER SYMPTOMS
RHINORRHEA: 0
BACK PAIN: 0
NAUSEA: 0
ABDOMINAL PAIN: 0
COUGH: 0
VOMITING: 0
VOICE CHANGE: 0
DIARRHEA: 0
WHEEZING: 0
SORE THROAT: 0
TROUBLE SWALLOWING: 0
CHEST TIGHTNESS: 1
PHOTOPHOBIA: 0
SHORTNESS OF BREATH: 1

## 2023-03-22 ASSESSMENT — PAIN - FUNCTIONAL ASSESSMENT: PAIN_FUNCTIONAL_ASSESSMENT: NONE - DENIES PAIN

## 2023-03-22 NOTE — CONSULTS
r tesio insertion  / remove 8/16/2018    OTHER SURGICAL HISTORY Left 02/17/2017    insertion a-v fistula left arm    SC ARTERIOVENOUS ANASTOMOSIS OPEN DIRECT Left 7/17/2018    REVISION AV FISTULA - LEFT ARM performed by Worth Opitz, MD at [address] NON-TUNNELED CENTRAL VENOUS CATH AGE 5 YR/> N/A 5/10/2018    TESIO CATHETER INSERTION performed by Juliana Jarrett MD at [address] [facility] PRERETINAL MEMBRANE Left 8/28/2018    PARS PLANA VITRECTOMY 25 GAUGE, VITREOUS HEMORRHAGE REMOVAL, ENDO LASER, AIR/FLUID  EXCHANGE LEFT EYE performed by Jimena Hagan MD at [address] [facility] N/A 1/29/2021    PERINEAL ABCESS INCISION AND DRAINAGE (LITHOTOMY) performed by Roc Monae MD at [address]     Current Medications:   Scheduled Meds:   [START ON 3/23/2023] Renal-Boa  1 tablet Oral Daily    calcium acetate  4 capsule Oral TID WC    carvedilol  25 mg Oral Nightly    [START ON 3/23/2023] carvedilol  50 mg Oral QAM    [START ON 3/23/2023] famotidine  20 mg Oral QAM    hydrALAZINE  50 mg Oral 3 times per day    insulin glargine-yfgn  20 Units SubCUTAneous Nightly    insulin lispro  10 Units SubCUTAneous TID WC    sodium chloride flush  5-40 mL IntraVENous 2 times per day    heparin (porcine)  5,000 Units SubCUTAneous Q12H    insulin lispro  0-8 Units SubCUTAneous TID WC    insulin lispro  0-4 Units SubCUTAneous Nightly    [START ON 3/23/2023] dexamethasone  6 mg IntraVENous Q24H    cefepime  2,000 mg IntraVENous Q12H     Continuous Infusions:   dextrose      sodium chloride       PRN Meds:albuterol, glucose, dextrose bolus **OR** dextrose bolus, glucagon (rDNA), dextrose, sodium chloride flush, sodium chloride, ondansetron **OR** ondansetron, polyethylene glycol, acetaminophen **OR** acetaminophen    Allergies:  Patient has no known allergies.     Social History:   Social History     Socioeconomic History    Marital status:    Tobacco Use    Smoking status: Some Days 02/11/2023 02:24 PM       Lab Results   Component Value Date/Time    TSH 3.850 01/27/2021 09:52 PM       Lab Results   Component Value Date/Time    COLORU Yellow 11/23/2017 10:05 PM    PHUR 8.5 11/23/2017 10:05 PM    LABCAST FEW 09/29/2013 02:15 PM    LABCAST FEW 09/29/2013 02:15 PM    WBCUA 2-5 11/23/2017 10:05 PM    WBCUA NONE 11/21/2010 10:30 PM    RBCUA 5-10 11/23/2017 10:05 PM    RBCUA 0-1 09/29/2013 02:15 PM    TRICHOMONAS RARE 08/16/2016 11:05 PM    BACTERIA RARE 11/23/2017 10:05 PM    CLARITYU Clear 11/23/2017 10:05 PM    SPECGRAV 1.015 11/23/2017 10:05 PM    LEUKOCYTESUR Negative 11/23/2017 10:05 PM    UROBILINOGEN 0.2 11/23/2017 10:05 PM    BILIRUBINUR Negative 11/23/2017 10:05 PM    BILIRUBINUR NEGATIVE 11/21/2010 10:30 PM    BLOODU MODERATE 11/23/2017 10:05 PM    GLUCOSEU 500 11/23/2017 10:05 PM    GLUCOSEU >=1000 11/21/2010 10:30 PM       No results found for: GNM3YMP, BEART, M2IHICRN, PHART, THGBART, DUW5LMN, PO2ART, OTE7DUA  Radiology:  XR CHEST PORTABLE   Final Result   New widespread airspace disease which could represent cardiogenic or   noncardiogenic edema. Microbiology:  Pending  No results for input(s): BC in the last 72 hours. No results for input(s): ORG in the last 72 hours. No results for input(s): Geoff Reyes in the last 72 hours. No results for input(s): STREPNEUMAGU in the last 72 hours. No results for input(s): LP1UAG in the last 72 hours. No results for input(s): ASO in the last 72 hours. No results for input(s): CULTRESP in the last 72 hours. Assessment:  Acute hypoxic respiratory failure on BiPAP  Bilateral pulmonary congestion  SARS-COVID-2 pneumonia  ESRD on HD    Plan:    Start and continue cefepime and vancomycin. DC ceftriaxone. Patient needs significant ultrafiltration during hemodialysis if hemodynamics allow. Shortness of breath primarily is due to pulmonary congestion rather than COVID infection.   Continue dexamethasone 6 mg daily  Elevated

## 2023-03-22 NOTE — PROGRESS NOTES
03/22/23 1234   NIV Type   $NIV $Daily Charge   Skin Assessment Clean, dry, & intact   Skin Protection for O2 Device Yes   Location Nose   Intervention(s) Skin Barrier   NIV Started/Stopped On   Equipment Type V60   Mode Bilevel   Mask Type Full face mask   Mask Size Medium   Settings/Measurements   PIP Observed 14 cm H20   IPAP 12 cmH20   CPAP/EPAP 8 cmH2O   Vt (Measured) 429 mL   Resp 28   FiO2  40 %   Minute Volume (L/min) 15 Liters   Mask Leak (lpm) 53 lpm   Comfort Level Fair   Using Accessory Muscles No   SpO2 99   Alarm Settings   Alarms On Y   Low Pressure (cmH2O) 8 cmH2O   High Pressure (cmH2O) 40 cmH2O   RR Low (bpm) 14   RR High (bpm) 45 br/min   Patient Observation   Observations oxygen red outlet alarm plugged in     Placed pt on bipap 12/8 40%. Pt was seen with RR 40's shallow breathing not being able to finish sentences without sob. Pt now RR low 20's, tolerating bipap well. Bilateral breath sounds.

## 2023-03-22 NOTE — ED NOTES
Dr Chelsey Gramajo notified of critical creatinine of [address]     [facility]  03/22/23 1873

## 2023-03-22 NOTE — PROGRESS NOTES
This patient is on medication that requires renal, weight, and/or indication dose adjustment. Date Body Weight IBW  Adjusted BW SCr  CrCl Dialysis status   3/22/2023   Ideal body weight: 48.8 kg (107 lb 8.4 oz) Serum creatinine: 13.8 mg/dL [facility]) 03/22/23 1209  Estimated creatinine clearance: 4 mL/min (A) HD       Pharmacy has dose-adjusted the following medication(s):    Date Previous Order Adjusted Order   3/22/2023 Cefepime 2g q12h  Cefepime 2g x 1,  Cefepime 1g q24h       These changes were made per protocol according to the [address] for Pharmacists. *Please note this dose may need readjusted if patient's condition changes. Please contact pharmacy with any questions regarding these changes.     Junaid Finn, [facility]  3/22/2023  5:24 PM

## 2023-03-22 NOTE — ED PROVIDER NOTES
Pupils are equal, round, and reactive to light. Cardiovascular:      Rate and Rhythm: Regular rhythm. Tachycardia present. Heart sounds: No friction rub. No gallop. Pulmonary:      Effort: Tachypnea and accessory muscle usage present. No respiratory distress. Breath sounds: No stridor. Decreased breath sounds and rales present. Abdominal:      General: There is no distension. Palpations: Abdomen is soft. Tenderness: There is no abdominal tenderness. There is no guarding or rebound. Musculoskeletal:         General: No tenderness or deformity. Cervical back: Normal range of motion and neck supple. No rigidity or tenderness. Right lower leg: Edema present. Left lower leg: Edema present. Skin:     General: Skin is warm. Capillary Refill: Capillary refill takes less than 2 seconds. Coloration: Skin is not jaundiced. Findings: No erythema. Nails: There is no clubbing. Neurological:      Mental Status: She is alert and oriented to person, place, and time. Sensory: No sensory deficit. Motor: No weakness. Psychiatric:         Mood and Affect: Mood is anxious. Behavior: Behavior normal.        Procedures     XR CHEST PORTABLE   Final Result   New widespread airspace disease which could represent cardiogenic or   noncardiogenic edema. EKG:  This EKG is signed by emergency department physician. Rate: 111  Rhythm: Sinus  Interpretation: Sinus tachycardia  Comparison: stable as compared to patient's most recent EKG     MDM:  44y.o. year old female presenting to the ER with complaints of shortness of breath with intermittent chest discomfort x3 days. Patient tachypneic with increased work of breathing noted on initial exam on 4 L. Patient reports that she missed dialysis on Monday with shortened dialysis on Saturday.   Patient placed on BiPAP creatinine elevated and patient with end-stage renal disease mild hyperkalemia elevated glucose with anion gap 20. Patient positive for COVID-negative for influenzaHemoglobin at baseline. Elevated BNP elevated procalcitonin troponin repeat sent. Patient on reevaluation with improved work of breathing, heart rate less than 100. Consult to nephrology placed, nephrologyin route to patient's bedside. Spoke to Dr. Reema Tapia, discussed patient will plan to admit patient at this time. Admission orders placed per Dr. Reema Tapia. ED Course as of 03/22/23 1413   Wed Mar 22, 2023   1316 Patient on revaluation with greatly improved work of breathing on BIPAP, heart rate improved with SPO2 > 90%^   [ACREN]   1340 Spoke to Dr. Reema Tapia, discussed patient, nephrology seeing patient, will plan to admit patient at this time will place admission orders per Dr. Reema Tapia to intermediate with telemetry inpatient. [CAREN]      ED Course User Index  [CAREN] Radha Vail DO        ED Course as of 03/22/23 1413   Wed Mar 22, 2023   1316 Patient on revaluation with greatly improved work of breathing on BIPAP, heart rate improved with SPO2 > 90%^   [CAREN]   1340 Spoke to Dr. Reema Tapia, discussed patient, nephrology seeing patient, will plan to admit patient at this time will place admission orders per Dr. Reema Tapia to intermediate with telemetry inpatient. [CAREN]      ED Course User Index  [CAREN] Radha Vail,        --------------------------------------------- PAST HISTORY ---------------------------------------------  Past Medical History:  has a past medical history of JOLLY (acute kidney injury) ([facility] AVF (arteriovenous fistula) ([facility] Chronic kidney disease, Dialysis AV fistula malfunction, initial encounter [facility] DM type 1 (diabetes mellitus, type 1) ([facility] Encounter regarding vascular access for dialysis for ESRD ([facility] ESRD (end stage renal disease) ([facility] Hemodialysis patient ([facility] Hypertension, and Tobacco abuse.     Past Surgical History:  has a past surgical history that includes Dilation and curettage of uterus

## 2023-03-22 NOTE — LETTER
[address] Medicaid  CERTIFICATION OF NECESSITY  FOR TRANSPORTATION   BY WHEELCHAIR VAN     Individual Information   1. Name: Ignacia Payton 2. [place] Medicaid Billing Number:    3. Address: [address] [place] [address]      Transportation Provider Information   4. Provider Name:    5. [place] Medicaid Provider Number:  National Provider Identifier (NPI):      Certification  7. Criteria:  By signing this document, the practitioner certifies that two statements are true:  A. This individual must be accompanied by a mobility-related assistive device from the point of pick-up to the point of drop-off. B. Transport of this individual by standard passenger vehicle or common carrier is precluded or contraindicated. 8. Period Beginning Date:    5. Length  [x] Not more than 1 day(s)  [] One Year     Additional Information Relevant to Certification   10. Comments or Explanations, If Necessary or Appropriate     COVID 19 +     Certifying Practitioner Information   11. Name of Practitioner:    12. [place] Medicaid Provider Number, If Applicable:  [address] Provider Identifier (NPI):      Signature Information   14. Date of Signature:  13. Name of Person Signin. Signature and Professional Designation:          [facility] [id number]  Rev. 2015      [address] [place] [address] Encounter Date/Time: 3/22/2023 [address] [place] [address] Account: [de-identified]    MRN: [id number]    Patient: Ignacia Payton    Contact Serial #: [id number]      ENCOUNTER          Patient Class: I Private Enc?   No Unit [facility] [id number]   Hospital Service: MED   Encounter DX: Hyperkalemia [E87.5]   ADM Provider: Vishal Hassan MD   Procedure:     ATT Provider: Jonathan Navarro MD   REF Provider:        Admission DX: Hyperkalemia, Acute pulmonary edema ([facility] ESRD (end stage renal disease) on dialysis [facility]), Acute respiratory failure with hypoxia ([facility] Hypervolemia, unspecified hypervolemia type, COVID-19, Acute respiratory

## 2023-03-22 NOTE — H&P
insertion  / remove 8/16/2018    OTHER SURGICAL HISTORY Left 02/17/2017    insertion a-v fistula left arm    PA ARTERIOVENOUS ANASTOMOSIS OPEN DIRECT Left 7/17/2018    REVISION AV FISTULA - LEFT ARM performed by Gabi Gamez MD at [address] NON-TUNNELED CENTRAL VENOUS CATH AGE 5 YR/> N/A 5/10/2018    TESIO CATHETER INSERTION performed by Ayana Bingham MD at [address] [facility] PRERETINAL MEMBRANE Left 8/28/2018    PARS PLANA VITRECTOMY 25 GAUGE, VITREOUS HEMORRHAGE REMOVAL, ENDO LASER, AIR/FLUID  EXCHANGE LEFT EYE performed by Maria Eugenia Andre MD at [address] [facility] N/A 1/29/2021    PERINEAL ABCESS INCISION AND DRAINAGE (LITHOTOMY) performed by Skip Sousa MD at [address] [facility] History:      Problem Relation Age of Onset    Stroke Mother     Cancer Father     Diabetes Paternal Aunt        Social History     Socioeconomic History    Marital status:    Tobacco Use    Smoking status: Some Days     Years: 15.00     Types: Cigarettes    Smokeless tobacco: Never   Vaping Use    Vaping Use: Never used   Substance and Sexual Activity    Alcohol use: Not Currently     Alcohol/week: 0.0 standard drinks    Drug use: No   Social History Narrative    ** Merged History Encounter **          Social Determinants of Health     Financial Resource Strain: Low Risk     Difficulty of Paying Living Expenses: Not hard at all   Food Insecurity: No Food Insecurity    Worried About Running Out of Food in the Last Year: Never true    Ran Out of Food in the Last Year: Never true         Home Medications:    Prior to Admission medications    Medication Sig Start Date End Date Taking?  Authorizing Provider   acetaminophen (TYLENOL) 500 MG tablet Take 500 mg by mouth every 6 hours as needed for Pain   Yes Historical Provider, MD   carvedilol (COREG) 25 MG tablet Take 25 mg by mouth at bedtime   Yes Historical Provider, MD   insulin lispro (HUMALOG) 100 UNIT/ML SOLN injection

## 2023-03-22 NOTE — ED NOTES
Dialysis called, plan to do treatment in room later today due to COVID(+).      Caden Lyman RN  03/22/23 0512

## 2023-03-22 NOTE — ED NOTES
Dr Shin Overall notified of high potassium and critical creatinine     Sonia Edmundo RN  03/22/23 7713

## 2023-03-22 NOTE — CONSULTS
hours as needed for Wheezing or Shortness of Breath 1 Inhaler 2    hydrALAZINE (APRESOLINE) 50 MG tablet Take 1 tablet by mouth every 8 hours 90 tablet 3    Insulin Glargine, 2 Unit Dial, (TOUJEO MAX SOLOSTAR) 300 UNIT/ML SOPN Inject 20 Units into the skin nightly      carvedilol (COREG) 25 MG tablet Take 50 mg by mouth every morning  5       Allergies:    Patient has no known allergies. Social History:     reports that she has been smoking cigarettes. She has never used smokeless tobacco. She reports that she does not currently use alcohol. She reports that she does not use drugs. Family History:         Problem Relation Age of Onset    Stroke Mother     Cancer Father     Diabetes Paternal Aunt        Review of Systems:   Pertinent items are noted in HPI. Physical Exam:      Patient Vitals for the past 24 hrs:   BP Temp Pulse Resp SpO2   03/22/23 1426 -- -- -- 23 --   03/22/23 1409 (!) 172/86 -- (!) 103 22 95 %   03/22/23 1245 (!) 207/103 -- (!) 107 26 94 %   03/22/23 1234 (!) 180/83 -- -- 28 97 %   03/22/23 1203 (!) 190/73 97.8 °F (36.6 °C) (!) 108 18 97 %       No intake or output data in the 24 hours ending 03/22/23 1430    General: Awake, alert, BiPAP  Neck: No JVD noted  Lungs: Crackles bilaterally upper, diminished to the bases bilaterally. BiPAP  CV: Tachycardic. No rub  Abd: Soft, nontender, nondistended. Active bowel sounds  Skin: Warm and dry. No rash on exposed extremities  Ext: Trace BLE edema   Neuro: Awake, answers questions appropriately    Data:    Recent Labs     03/22/23  1125   WBC 5.8   HGB 8.7*   HCT 27.0*   MCV 98.2          Recent Labs     03/22/23  1125 03/22/23  1209     --    K 5.2*  --    CL 95*  --    CO2 22  --    CREATININE 17.7* 13.8*   BUN 80*  --    LABGLOM 2 3   GLUCOSE 218*  --    CALCIUM 9.4  --        Vit D, 25-Hydroxy   Date Value Ref Range Status   02/12/2023 67 30 - 100 ng/mL Final     Comment:     <20 ng/mL. ........... Nsihi Griffin Deficient  20-30 ng/mL.......... Rilla Balk Insufficient   ng/mL. ........ Rilla Balk Sufficient  >100 ng/mL. .......... Rilla Balk Toxic         PTH   Date Value Ref Range Status   01/29/2021 191 (H) 15 - 65 pg/mL Final       Recent Labs     03/22/23  1125   ALT 17   AST 33*   ALKPHOS 111*   BILITOT 1.2       Recent Labs     03/22/23  1125   LABALBU 3.8       Ferritin   Date Value Ref Range Status   08/30/2022 180 (A) 9.0 - 150.0 ng/mL Final     Iron   Date Value Ref Range Status   03/18/2022 43 37 - 145 mcg/dL Final     TIBC   Date Value Ref Range Status   03/18/2022 187 (L) 250 - 450 mcg/dL Final       Vitamin B-12   Date Value Ref Range Status   01/27/2021 784 211 - 946 pg/mL Final       Folate   Date Value Ref Range Status   01/27/2021 19.4 4.8 - 24.2 ng/mL Final       Lab Results   Component Value Date/Time    COLORU Yellow 11/23/2017 10:05 PM    NITRU Negative 11/23/2017 10:05 PM    GLUCOSEU 500 11/23/2017 10:05 PM    GLUCOSEU >=1000 11/21/2010 10:30 PM    KETUA Negative 11/23/2017 10:05 PM    UROBILINOGEN 0.2 11/23/2017 10:05 PM    BILIRUBINUR Negative 11/23/2017 10:05 PM    BILIRUBINUR NEGATIVE 11/21/2010 10:30 PM       No results found for: NACHO, CREURRAN, MACREATRATIO, OSMOU    No components found for: URIC    No results found for: LIPIDPAN    Assessment and Plans:    ESRD on HD  Outpatient [facility] TTS second shift via left arm AV fistula  Last HD 3/16 - approximately only 1/2 treatment   Monitor labs   Continue HD while inpatient - HD today     2. Shortness of breath   CXR 3/22 new widespread airspace disease  COVID-positive  BiPAP  HD today  Monitor    3. Hyperkalemia  In the setting of ESRD and missed HD treatments  K+ 5.2 today  Low potassium diet when able to eat  HD today  Repeat BMP this evening  Monitor labs    4. Anemia of CKD  Hemoglobin target 10-12  Hemoglobin 8.7-below target  Transfuse for hemoglobin<7  Monitor H&H    5. Hypertension in CKD 5/ESRD  BP goal<140/90  BP above goal  Monitor BPs    6.   Secondary hyperparathyroidism

## 2023-03-22 NOTE — PROGRESS NOTES
Pharmacy Consultation Note  (Antibiotic Dosing and Monitoring)    Initial consult date: 03/22/23  Consulting physician/provider: Dr. Sony Pacheco  Drug: Vancomycin  Indication: HAP    Age/  Gender Height Weight IBW  Allergy Information   37 y.o./female 4'11\"       Ideal body weight: 48.8 kg (107 lb 8.4 oz)   Patient has no known allergies. Renal Function:  Recent Labs     03/22/23  1125 03/22/23  1209   BUN 80*  --    CREATININE 17.7* 13.8*     No intake or output data in the 24 hours ending 03/22/23 1726    Vancomycin Monitoring:  Trough:  No results for input(s): VANCOTROUGH in the last 72 hours. Random:  No results for input(s): VANCORANDOM in the last 72 hours. No results for input(s): Christopher Filemon in the last 72 hours. Historical Cultures:  Organism   Date Value Ref Range Status   02/11/2023 MRSA nasal colonization (A)  Final     No results for input(s): BC in the last 72 hours. Vancomycin Administration Times:  Recent vancomycin administrations        No vancomycin IV orders with administrations found. Date  SCr/CrCl       or HD Drug/Dose Time   Given Level(s)   (Time)   03/22 HD x4Hrs Vancomycin 1250 mg IV x1 <2100>                           Assessment:  Patient is a 44 y.o. female who has been initiated on vancomycin  Estimated Creatinine Clearance: 4 mL/min (A) (based on SCr of 13.8 mg/dL [facility])).   To dose vancomycin, pharmacy will be utilizing dosing based off of levels due to patient requiring hemodialysis    Plan:  Give vancomycin 1250 mg IV x1 today post-HD  Will check vancomycin levels when appropriate  Will continue to monitor renal function   Pharmacy to follow      Eddi Cat [facility] 3/22/2023 5:26 PM

## 2023-03-22 NOTE — ED NOTES
Report received from JENNIFER Billings     [facility]  03/22/23 8448

## 2023-03-22 NOTE — ED NOTES
Dialysis at bedside     [facility]  03/22/23 9849 Health Maintenance Summary     Topic Due On Due Status Completed On    IMMUNIZATION - DTaP/Tdap/Td Oct 2, 2018 Not Due Oct 2, 2008    Immunization-Influenza Sep 1, 2017 Not Due           Patient is up to date, no discussion needed .

## 2023-03-23 PROBLEM — Z22.322 MRSA COLONIZATION: Status: ACTIVE | Noted: 2023-03-23

## 2023-03-23 LAB
ALBUMIN SERPL-MCNC: 3.6 G/DL (ref 3.5–5.2)
ALP SERPL-CCNC: 107 U/L (ref 35–104)
ALT SERPL-CCNC: 15 U/L (ref 0–32)
ANION GAP SERPL CALCULATED.3IONS-SCNC: 25 MMOL/L (ref 7–16)
AST SERPL-CCNC: 20 U/L (ref 0–31)
BACTERIA BLD CULT ORG #2: NORMAL
BACTERIA BLD CULT: NORMAL
BASOPHILS # BLD: 0.01 E9/L (ref 0–0.2)
BASOPHILS NFR BLD: 0.1 % (ref 0–2)
BILIRUB SERPL-MCNC: 0.6 MG/DL (ref 0–1.2)
BUN SERPL-MCNC: 40 MG/DL (ref 6–20)
CALCIUM SERPL-MCNC: 9.3 MG/DL (ref 8.6–10.2)
CHLORIDE SERPL-SCNC: 91 MMOL/L (ref 98–107)
CO2 SERPL-SCNC: 22 MMOL/L (ref 22–29)
CREAT SERPL-MCNC: 8.6 MG/DL (ref 0.5–1)
EOSINOPHIL # BLD: 0 E9/L (ref 0.05–0.5)
EOSINOPHIL NFR BLD: 0 % (ref 0–6)
ERYTHROCYTE [DISTWIDTH] IN BLOOD BY AUTOMATED COUNT: 16.3 FL (ref 11.5–15)
GLUCOSE SERPL-MCNC: 281 MG/DL (ref 74–99)
HBA1C MFR BLD: 7.3 % (ref 4–5.6)
HCT VFR BLD AUTO: 25.9 % (ref 34–48)
HGB BLD-MCNC: 8.5 G/DL (ref 11.5–15.5)
IMM GRANULOCYTES # BLD: 0.03 E9/L
IMM GRANULOCYTES NFR BLD: 0.4 % (ref 0–5)
LYMPHOCYTES # BLD: 1.05 E9/L (ref 1.5–4)
LYMPHOCYTES NFR BLD: 14.2 % (ref 20–42)
MAGNESIUM SERPL-MCNC: 2.4 MG/DL (ref 1.6–2.6)
MCH RBC QN AUTO: 31.8 PG (ref 26–35)
MCHC RBC AUTO-ENTMCNC: 32.8 % (ref 32–34.5)
MCV RBC AUTO: 97 FL (ref 80–99.9)
METER GLUCOSE: 111 MG/DL (ref 74–99)
METER GLUCOSE: 202 MG/DL (ref 74–99)
METER GLUCOSE: 377 MG/DL (ref 74–99)
MONOCYTES # BLD: 0.7 E9/L (ref 0.1–0.95)
MONOCYTES NFR BLD: 9.5 % (ref 2–12)
NEUTROPHILS # BLD: 5.58 E9/L (ref 1.8–7.3)
NEUTS SEG NFR BLD: 75.8 % (ref 43–80)
PHOSPHATE SERPL-MCNC: 7.3 MG/DL (ref 2.5–4.5)
PLATELET # BLD AUTO: 230 E9/L (ref 130–450)
PMV BLD AUTO: 10.2 FL (ref 7–12)
POTASSIUM SERPL-SCNC: 4.6 MMOL/L (ref 3.5–5)
PROT SERPL-MCNC: 7.2 G/DL (ref 6.4–8.3)
RBC # BLD AUTO: 2.67 E12/L (ref 3.5–5.5)
SODIUM SERPL-SCNC: 138 MMOL/L (ref 132–146)
TROPONIN, HIGH SENSITIVITY: 236 NG/L (ref 0–9)
TROPONIN, HIGH SENSITIVITY: 264 NG/L (ref 0–9)
VANCOMYCIN SERPL-MCNC: 5.2 MCG/ML (ref 5–40)
WBC # BLD: 7.4 E9/L (ref 4.5–11.5)

## 2023-03-23 PROCEDURE — 6370000000 HC RX 637 (ALT 250 FOR IP): Performed by: INTERNAL MEDICINE

## 2023-03-23 PROCEDURE — 94660 CPAP INITIATION&MGMT: CPT

## 2023-03-23 PROCEDURE — 2580000003 HC RX 258: Performed by: INTERNAL MEDICINE

## 2023-03-23 PROCEDURE — 6360000002 HC RX W HCPCS: Performed by: FAMILY MEDICINE

## 2023-03-23 PROCEDURE — 85025 COMPLETE CBC W/AUTO DIFF WBC: CPT

## 2023-03-23 PROCEDURE — 6360000002 HC RX W HCPCS: Performed by: INTERNAL MEDICINE

## 2023-03-23 PROCEDURE — 83036 HEMOGLOBIN GLYCOSYLATED A1C: CPT

## 2023-03-23 PROCEDURE — 84484 ASSAY OF TROPONIN QUANT: CPT

## 2023-03-23 PROCEDURE — 82962 GLUCOSE BLOOD TEST: CPT

## 2023-03-23 PROCEDURE — 84100 ASSAY OF PHOSPHORUS: CPT

## 2023-03-23 PROCEDURE — 94640 AIRWAY INHALATION TREATMENT: CPT

## 2023-03-23 PROCEDURE — S5553 INSULIN LONG ACTING 5 U: HCPCS | Performed by: INTERNAL MEDICINE

## 2023-03-23 PROCEDURE — 80202 ASSAY OF VANCOMYCIN: CPT

## 2023-03-23 PROCEDURE — 90935 HEMODIALYSIS ONE EVALUATION: CPT

## 2023-03-23 PROCEDURE — 80053 COMPREHEN METABOLIC PANEL: CPT

## 2023-03-23 PROCEDURE — 2700000000 HC OXYGEN THERAPY PER DAY

## 2023-03-23 PROCEDURE — 83735 ASSAY OF MAGNESIUM: CPT

## 2023-03-23 PROCEDURE — 2500000003 HC RX 250 WO HCPCS: Performed by: INTERNAL MEDICINE

## 2023-03-23 PROCEDURE — 36415 COLL VENOUS BLD VENIPUNCTURE: CPT

## 2023-03-23 PROCEDURE — 2060000000 HC ICU INTERMEDIATE R&B

## 2023-03-23 RX ORDER — MECOBALAMIN 5000 MCG
5 TABLET,DISINTEGRATING ORAL NIGHTLY
Status: DISCONTINUED | OUTPATIENT
Start: 2023-03-23 | End: 2023-03-25 | Stop reason: HOSPADM

## 2023-03-23 RX ORDER — LACTOBACILLUS RHAMNOSUS GG 10B CELL
1 CAPSULE ORAL 3 TIMES DAILY
Status: DISCONTINUED | OUTPATIENT
Start: 2023-03-23 | End: 2023-03-25 | Stop reason: HOSPADM

## 2023-03-23 RX ORDER — INSULIN GLARGINE-YFGN 100 [IU]/ML
24 INJECTION, SOLUTION SUBCUTANEOUS NIGHTLY
Status: DISCONTINUED | OUTPATIENT
Start: 2023-03-23 | End: 2023-03-24

## 2023-03-23 RX ORDER — GUAIFENESIN 400 MG/1
400 TABLET ORAL 3 TIMES DAILY
Status: DISCONTINUED | OUTPATIENT
Start: 2023-03-23 | End: 2023-03-25 | Stop reason: HOSPADM

## 2023-03-23 RX ORDER — GUAIFENESIN 600 MG/1
600 TABLET, EXTENDED RELEASE ORAL 2 TIMES DAILY
Status: DISCONTINUED | OUTPATIENT
Start: 2023-03-23 | End: 2023-03-23 | Stop reason: RX

## 2023-03-23 RX ORDER — ACETAMINOPHEN 500 MG
1000 TABLET ORAL EVERY 6 HOURS PRN
Status: DISCONTINUED | OUTPATIENT
Start: 2023-03-23 | End: 2023-03-25 | Stop reason: HOSPADM

## 2023-03-23 RX ORDER — MECOBALAMIN 5000 MCG
5 TABLET,DISINTEGRATING ORAL NIGHTLY PRN
Status: DISCONTINUED | OUTPATIENT
Start: 2023-03-23 | End: 2023-03-25 | Stop reason: HOSPADM

## 2023-03-23 RX ORDER — BUDESONIDE 0.5 MG/2ML
0.5 INHALANT ORAL 2 TIMES DAILY
Status: DISCONTINUED | OUTPATIENT
Start: 2023-03-23 | End: 2023-03-25 | Stop reason: HOSPADM

## 2023-03-23 RX ORDER — IPRATROPIUM BROMIDE AND ALBUTEROL SULFATE 2.5; .5 MG/3ML; MG/3ML
1 SOLUTION RESPIRATORY (INHALATION)
Status: DISCONTINUED | OUTPATIENT
Start: 2023-03-23 | End: 2023-03-25 | Stop reason: HOSPADM

## 2023-03-23 RX ORDER — DIPHENHYDRAMINE HYDROCHLORIDE 50 MG/ML
50 INJECTION INTRAMUSCULAR; INTRAVENOUS ONCE
Status: COMPLETED | OUTPATIENT
Start: 2023-03-23 | End: 2023-03-23

## 2023-03-23 RX ORDER — BENZONATATE 100 MG/1
100 CAPSULE ORAL 3 TIMES DAILY
Status: DISCONTINUED | OUTPATIENT
Start: 2023-03-23 | End: 2023-03-25 | Stop reason: HOSPADM

## 2023-03-23 RX ORDER — DIPHENHYDRAMINE HYDROCHLORIDE 50 MG/ML
25 INJECTION INTRAMUSCULAR; INTRAVENOUS ONCE
Status: COMPLETED | OUTPATIENT
Start: 2023-03-23 | End: 2023-03-23

## 2023-03-23 RX ADMIN — HEPARIN SODIUM 5000 UNITS: 10000 INJECTION INTRAVENOUS; SUBCUTANEOUS at 22:05

## 2023-03-23 RX ADMIN — INSULIN LISPRO 2 UNITS: 100 INJECTION, SOLUTION INTRAVENOUS; SUBCUTANEOUS at 17:13

## 2023-03-23 RX ADMIN — CALCIUM ACETATE 2668 MG: 667 CAPSULE ORAL at 17:13

## 2023-03-23 RX ADMIN — HYDRALAZINE HYDROCHLORIDE 50 MG: 50 TABLET, FILM COATED ORAL at 22:00

## 2023-03-23 RX ADMIN — GUAIFENESIN 400 MG: 400 TABLET ORAL at 15:41

## 2023-03-23 RX ADMIN — CALCIUM ACETATE 2668 MG: 667 CAPSULE ORAL at 08:39

## 2023-03-23 RX ADMIN — DEXAMETHASONE 6 MG: 4 TABLET ORAL at 09:04

## 2023-03-23 RX ADMIN — CEFEPIME 1000 MG: 1 INJECTION, POWDER, FOR SOLUTION INTRAMUSCULAR; INTRAVENOUS at 22:09

## 2023-03-23 RX ADMIN — Medication 1 CAPSULE: at 22:00

## 2023-03-23 RX ADMIN — VANCOMYCIN HYDROCHLORIDE 1000 MG: 1 INJECTION, POWDER, LYOPHILIZED, FOR SOLUTION INTRAVENOUS at 15:48

## 2023-03-23 RX ADMIN — SODIUM CHLORIDE, PRESERVATIVE FREE 10 ML: 5 INJECTION INTRAVENOUS at 08:41

## 2023-03-23 RX ADMIN — BUDESONIDE 500 MCG: 0.5 SUSPENSION RESPIRATORY (INHALATION) at 22:15

## 2023-03-23 RX ADMIN — BENZONATATE 100 MG: 100 CAPSULE ORAL at 13:50

## 2023-03-23 RX ADMIN — Medication 1 CAPSULE: at 15:41

## 2023-03-23 RX ADMIN — Medication 5 MG: at 22:00

## 2023-03-23 RX ADMIN — Medication 1 CAPSULE: at 09:04

## 2023-03-23 RX ADMIN — SODIUM CHLORIDE, PRESERVATIVE FREE 10 ML: 5 INJECTION INTRAVENOUS at 15:41

## 2023-03-23 RX ADMIN — INSULIN LISPRO 10 UNITS: 100 INJECTION, SOLUTION INTRAVENOUS; SUBCUTANEOUS at 17:14

## 2023-03-23 RX ADMIN — NEPHROCAP 1 MG: 1 CAP ORAL at 08:40

## 2023-03-23 RX ADMIN — GUAIFENESIN 400 MG: 400 TABLET ORAL at 09:05

## 2023-03-23 RX ADMIN — Medication 5 MG: at 22:01

## 2023-03-23 RX ADMIN — IPRATROPIUM BROMIDE AND ALBUTEROL SULFATE 1 AMPULE: .5; 2.5 SOLUTION RESPIRATORY (INHALATION) at 22:15

## 2023-03-23 RX ADMIN — HEPARIN SODIUM 5000 UNITS: 10000 INJECTION INTRAVENOUS; SUBCUTANEOUS at 08:40

## 2023-03-23 RX ADMIN — GUAIFENESIN 400 MG: 400 TABLET ORAL at 22:00

## 2023-03-23 RX ADMIN — INSULIN GLARGINE-YFGN 24 UNITS: 100 INJECTION, SOLUTION SUBCUTANEOUS at 22:04

## 2023-03-23 RX ADMIN — INSULIN LISPRO 6 UNITS: 100 INJECTION, SOLUTION INTRAVENOUS; SUBCUTANEOUS at 06:21

## 2023-03-23 RX ADMIN — BENZONATATE 100 MG: 100 CAPSULE ORAL at 22:00

## 2023-03-23 RX ADMIN — DIPHENHYDRAMINE HYDROCHLORIDE 50 MG: 50 INJECTION, SOLUTION INTRAMUSCULAR; INTRAVENOUS at 00:52

## 2023-03-23 RX ADMIN — INSULIN LISPRO 0 UNITS: 100 INJECTION, SOLUTION INTRAVENOUS; SUBCUTANEOUS at 00:40

## 2023-03-23 RX ADMIN — CARVEDILOL 50 MG: 25 TABLET, FILM COATED ORAL at 08:39

## 2023-03-23 RX ADMIN — VANCOMYCIN HYDROCHLORIDE 1250 MG: 10 INJECTION, POWDER, LYOPHILIZED, FOR SOLUTION INTRAVENOUS at 00:14

## 2023-03-23 RX ADMIN — INSULIN LISPRO 4 UNITS: 100 INJECTION, SOLUTION INTRAVENOUS; SUBCUTANEOUS at 22:05

## 2023-03-23 RX ADMIN — DIPHENHYDRAMINE HYDROCHLORIDE 25 MG: 50 INJECTION, SOLUTION INTRAMUSCULAR; INTRAVENOUS at 15:41

## 2023-03-23 RX ADMIN — CARVEDILOL 25 MG: 25 TABLET, FILM COATED ORAL at 22:00

## 2023-03-23 RX ADMIN — BENZONATATE 100 MG: 100 CAPSULE ORAL at 09:04

## 2023-03-23 RX ADMIN — FAMOTIDINE 20 MG: 20 TABLET ORAL at 08:39

## 2023-03-23 RX ADMIN — INSULIN LISPRO 10 UNITS: 100 INJECTION, SOLUTION INTRAVENOUS; SUBCUTANEOUS at 06:18

## 2023-03-23 ASSESSMENT — LIFESTYLE VARIABLES
HOW MANY STANDARD DRINKS CONTAINING ALCOHOL DO YOU HAVE ON A TYPICAL DAY: PATIENT DOES NOT DRINK
HOW OFTEN DO YOU HAVE A DRINK CONTAINING ALCOHOL: NEVER

## 2023-03-23 NOTE — PROGRESS NOTES
continue to monitor renal function   Pharmacy to follow      Javier Deshpande, [address] 3/23/2023 9:53 AM

## 2023-03-23 NOTE — PROGRESS NOTES
Patient would like to use home supply to check own blood sugar because it doesn't require figure sticks.

## 2023-03-23 NOTE — FLOWSHEET NOTE
03/22/23 2015   Vital Signs   /69   Temp 97.8 °F (36.6 °C)   Heart Rate (!) 119   Resp 21   SpO2 100 %   Pain Assessment   Pain Assessment None - Denies Pain   Post-Hemodialysis Assessment   Post-Treatment Procedures Blood returned; Access bleeding time < 10 minutes   Machine Disinfection Process Acid/Vinegar Clean;Heat Disinfect; Exterior Machine Disinfection   Rinseback Volume (ml) 300 ml   Blood Volume Processed (Liters) 99.5 l/min   Dialyzer Clearance Moderately streaked   Duration of Treatment (minutes) 240 minutes   Heparin Amount Administered During Treatment (mL) 0 mL   Hemodialysis Intake (ml) 300 ml   Hemodialysis Output (ml) 4300 ml   NET Removed (ml) 4000   Tolerated Treatment Good   Interventions Taken Ultrafiltration stopped;Ultrafiltration goal decreased   Patient Response to Treatment Treatment complete; terminated per [facility] p&p; all blood rinsed back and needles removed; pressure held till stasis achieved; band aids and tape applied to needle sites; (+) thrill/bruit above and below needle sites; post VSS; net UF 4 L; BVP 99.5 L; A-B mostly A profile during treatment with ending A profile; patient c/o of cramp in right calf during treatment; resolved by stopping UF briefly and lowering UF goal to 4.3 L and assisted patient with stretching her calf muscle; report off given to Albert Landis RN   Bilateral Breath Sounds Diminished   Edema Generalized;Right upper extremity; Left upper extremity;Right lower extremity; Left lower extremity   Edema Generalized +1   RUE Edema +2;Non-pitting   LUE Edema +3;Non-pitting   RLE Edema +1;Pitting   LLE Edema +1;Pitting   Physician Notified No   Time Off 2003   Patient Disposition Remain in ICU/ED

## 2023-03-23 NOTE — PROGRESS NOTES
Hospitalist Progress Note            Patient: Ирина Hatfield Age: 44 y.o.   DOA: 3/22/2023 Admit Dx / CC:  Hyperkalemia [E87.5]  Acute pulmonary edema (HCC) [J81.0]  ESRD (end stage renal disease) on dialysis ([address] [N18.6, Z99.2]  Acute respiratory failure with hypoxia (HCC) [J96.01]  Hypervolemia, unspecified hypervolemia type [E87.70]  COVID-19 [U07.1]  Acute respiratory failure due to COVID-19 ([address] [U07.1, J96.00]   LOS:  LOS: 1 day      Assessment/ Plan:     Acute Hypoxemic Respiratory Failure 2/2 volume overload due to noncompliance with HD and possible COVID 19 PNA, ?superimposed bacterial PNA, improving  pattern of infiltrate on CXR is diffuse though could be consistent with fluid overload from renal disease  Was on BIPAP on admission and down on 4 L O2, cont to wean off O2 as tolerated  Tested +ve COVID 19 on 3/22  Cont decadron started on admission but change to PO  Cont cefepime and vancomycin for now  Nebs  Bronchial hygiene  Currently receiving HD  ID following     ESRD on HD TTS with HD noncompliance   HD per nephro     Uncontrolled Diabetes Mellitus, type 1  A1c 7.3  Diabetes ED  continue home medication regimen including lantus, increase 24 units QHS, cont humalog 10 units TIDAC  ISS     Chest Pressure / Elevated Troponin  Trop trend not consistent with NSTEMI    Noncompliance    DVT ppx:  heparin  Code status: Full code    Plan discharge tomorrow if stable    Plan of care discussed with: patient and bedside RN    Patient Active Problem List   Diagnosis    Type 1 diabetes mellitus with chronic kidney disease on chronic dialysis ([facility]    ESRD (end stage renal disease) on dialysis ([address]    HTN (hypertension), benign    Uncontrolled type 1 diabetes mellitus with hyperglycemia ([address]    Chronic deep vein thrombosis (DVT) of right upper extremity (HCC)    Facial cellulitis    Acute hyperkalemia    Nasal polyp    Periorbital cellulitis of right eye    Periorbital cellulitis

## 2023-03-23 NOTE — PLAN OF CARE
Problem: Discharge Planning  Goal: Discharge to home or other facility with appropriate resources  3/23/2023 1826 by Ervin Fermin RN  Outcome: Progressing  3/23/2023 0944 by Jena Camargo RN  Outcome: Progressing     Problem: Chronic Conditions and Co-morbidities  Goal: Patient's chronic conditions and co-morbidity symptoms are monitored and maintained or improved  3/23/2023 1826 by Ervin Fermin RN  Outcome: Progressing  3/23/2023 0944 by Jena Camargo RN  Outcome: Progressing     Problem: Cardiovascular - Adult  Goal: Maintains optimal cardiac output and hemodynamic stability  Outcome: Progressing     Problem: Metabolic/Fluid and Electrolytes - Adult  Goal: Hemodynamic stability and optimal renal function maintained  Outcome: Progressing

## 2023-03-23 NOTE — CARE COORDINATION
Full Code   Patient's Primary Decision Maker is: Named in Scanned ACP Document    Primary Decision Maker: Annelise Masters - Brother/Sister - [phone]    Secondary Decision Maker: Eva Monsalve - Brother/Sister - [phone]    Supplemental (Other) Decision Maker: Mervat Carlos - Other - [phone]    Supplemental (Other) Decision Maker: Lopez Mandujano - Brother/Sister - [phone]    Supplemental (Other) Decision Maker: Cynthia Otto - [phone]    Discharge Planning:    Patient lives with: Children Type of Home: House  Primary Care Giver: Self  Patient Support Systems include: Family Members   Current Financial resources:    Current community resources:    Current services prior to admission: None            Current DME:              Type of Home Care services:  None    ADLS  Prior functional level: Independent in ADLs/IADLs  Current functional level: Independent in ADLs/IADLs    PT AM-PAC:   /24  OT AM-PAC:   /24    Family can provide assistance at DC: No  Would you like Case Management to discuss the discharge plan with any other family members/significant others, and if so, who? No  Plans to Return to Present Housing: Yes  Other Identified Issues/Barriers to RETURNING to current housing:   Potential Assistance needed at discharge: N/A            Potential DME:    Patient expects to discharge to: [address] [place] [address] for transportation at discharge:      Financial    Payor: [employer] / Plan: [employer] / Product Type: *No Product type* /     Does insurance require precert for SNF: Yes    Potential assistance Purchasing Medications: No  Meds-to-Beds request: Yes      [facility] #[id number] [address] [phone] [facility] [phone] 265[address] [facility] [address]  [facility] [address]  Phone: [phone] Fax: [phone]      Notes:    Factors facilitating achievement of predicted outcomes:  Motivated, Cooperative, and Pleasant    Barriers to discharge: Limited family support    Additional Case Management Notes: The Plan for Transition of Care is related to the following treatment goals of Hyperkalemia [E87.5]  Acute pulmonary edema (HCC) [J81.0]  ESRD (end stage renal disease) on dialysis ([facility] [N18.6, Z99.2]  Acute respiratory failure with hypoxia (HCC) [J96.01]  Hypervolemia, unspecified hypervolemia type [E87.70]  COVID-19 [U07.1]  Acute respiratory failure due to COVID-19 ([facility] [U07.1, E89.28]    IF APPLICABLE: The Patient and/or patient representative Alvaro Raymundo and her family were provided with a choice of provider and agrees with the discharge plan. Freedom of choice list with basic dialogue that supports the patient's individualized plan of care/goals and shares the quality data associated with the providers was provided to:     Patient Representative Name:       The Patient and/or Patient Representative Agree with the Discharge Plan?       Yeimi Grey RN  Case Management Department

## 2023-03-23 NOTE — PROGRESS NOTES
Patient called RN into room c/o being itchy everywhere. Vanco started at [facility]. Antibiotic stopped, IV flushed. VS obtained, Dr. Niles Collet notified, orders received.

## 2023-03-23 NOTE — PLAN OF CARE
Patient's chart updated to reflect:      . - HF care plan, HF education points and HF discharge instructions.  -Orders: 2 gram sodium diet, daily weights, I/O.  -PCP and/or Cardiologist appointment to be scheduled within 7 days of hospital discharge.  -History of HF, not primary admission Dx.   Patient admitted for treatment of     Plan:     Acute Hypoxemic Respiratory Failure  -due to fluid overload, most likely a consequence of ESRD with missed HD  -COVID pneumonia also a consideration given presentation  -continue BIPAP for respiratory support and wean as tolerated  -treat individual issues as outlined below  -monitor on telemetry with continuous pulse oximetry  -Elevated BNP difficult to interpret in setting of noncardiogenic fluid overload and ESRD  Noy Fernandez RN RN, BSN  Heart Failure Navigator

## 2023-03-23 NOTE — PROGRESS NOTES
7. 4   HGB 8.7* 8.5*   HCT 27.0* 25.9*   MCV 98.2 97.0    230         Recent Labs     03/22/23  1925 03/22/23  2320 03/23/23  0519    133 138   K 4.4 4.4 4.6   CL 90* 85* 91*   CO2 21* 20* 22   CREATININE 7.1* 7.6* 8.6*   BUN 27* 31* 40*   LABGLOM 7 6 6   GLUCOSE 214* 269* 281*   CALCIUM 9.3 9.3 9.3   PHOS  --   --  7.3*   MG  --   --  2.4         Vit D, 25-Hydroxy   Date Value Ref Range Status   02/12/2023 67 30 - 100 ng/mL Final     Comment:     <20 ng/mL. ........... Charlett Bigger Deficient  20-30 ng/mL. ......... Charlett Bigger Insufficient   ng/mL. ........ Charlett Bigger Sufficient  >100 ng/mL. .......... Charlett Bigger Toxic         PTH   Date Value Ref Range Status   01/29/2021 191 (H) 15 - 65 pg/mL Final       Recent Labs     03/22/23  1125 03/23/23  0519   ALT 17 15   AST 33* 20   ALKPHOS 111* 107*   BILITOT 1.2 0.6         Recent Labs     03/22/23  1125 03/23/23  0519   LABALBU 3.8 3.6         Ferritin   Date Value Ref Range Status   08/30/2022 180 (A) 9.0 - 150.0 ng/mL Final     Iron   Date Value Ref Range Status   03/18/2022 43 37 - 145 mcg/dL Final     TIBC   Date Value Ref Range Status   03/18/2022 187 (L) 250 - 450 mcg/dL Final       Vitamin B-12   Date Value Ref Range Status   01/27/2021 784 211 - 946 pg/mL Final       Folate   Date Value Ref Range Status   01/27/2021 19.4 4.8 - 24.2 ng/mL Final       Lab Results   Component Value Date/Time    COLORU Yellow 11/23/2017 10:05 PM    NITRU Negative 11/23/2017 10:05 PM    GLUCOSEU 500 11/23/2017 10:05 PM    GLUCOSEU >=1000 11/21/2010 10:30 PM    KETUA Negative 11/23/2017 10:05 PM    UROBILINOGEN 0.2 11/23/2017 10:05 PM    BILIRUBINUR Negative 11/23/2017 10:05 PM    BILIRUBINUR NEGATIVE 11/21/2010 10:30 PM       No results found for: NACHO, CREURRAN, MACREATRATIO, OSMOU    No components found for: URIC    No results found for: LIPIDPAN    Assessment and Plans:    ESRD on HD  Outpatient [address] TTS second shift via left arm AV fistula  Last outpatient HD 3/16 - approximately only 1/2 treatment S/p HD 3/22 net UF 4 L  Monitor labs   Continue HD while inpatient - HD today     2. Acute respiratory failure/COVID pneumonia  CXR 3/22 new widespread airspace disease  COVID-positive  BiPAP  Antibiotics  ID following    3. Hyperkalemia  In the setting of ESRD and missed HD treatments  K+ 5.2 on 3/22--> 4.6 today  Low potassium diet   Monitor labs    4. Anemia of CKD  Hemoglobin target 10-12  Hemoglobin 8.5-below target  Transfuse for hemoglobin<7  Monitor H&H    5. Hypertension in CKD 5/ESRD  BP goal<140/90  BP at goal  Monitor BPs    6.   Secondary hyperparathyroidism of renal origin   and phosphorus 7 on 3/11 in the outpatient setting  Phos 7.3 on 3/23  Low phos diet   On calcium acetate   Monitor labs    KEMI Martinez - CNP  Pt seen from doorway of dialysis room with gaudencio carpio np  Dw hd nurse arslan  Pt tolerating hd  Uf as tolerated  Genet Correia MD

## 2023-03-23 NOTE — PROGRESS NOTES
Chart reviewed. Talked with nurse, Maria Guadalupe Jules and patient is going to dialysis for 4 hour treatment at this time. We will follow chart and see at a later time.

## 2023-03-23 NOTE — FLOWSHEET NOTE
03/23/23 1311   Vital Signs   BP (!) 143/64   Temp 97.7 °F (36.5 °C)   Heart Rate 99   Resp 20   Weight 149 lb 14.6 oz (68 kg)   Weight Method Bed scale   Percent Weight Change -3.13   Post-Hemodialysis Assessment   Post-Treatment Procedures Blood returned; Access bleeding time < 10 minutes   Machine Disinfection Process Acid/Vinegar Clean;Heat Disinfect; Exterior Machine Disinfection   Rinseback Volume (ml) 300 ml   Blood Volume Processed (Liters) 94.6 l/min   Dialyzer Clearance Moderately streaked   Duration of Treatment (minutes) 230 minutes   Hemodialysis Intake (ml) 300 ml   Hemodialysis Output (ml) 2500 ml   NET Removed (ml) 2200   Tolerated Treatment Good   Patient Response to Treatment tolerated tx well. removed 2.2L   Time Off 1311   Patient Disposition Return to room

## 2023-03-23 NOTE — PLAN OF CARE
Problem: Discharge Planning  Goal: Discharge to home or other facility with appropriate resources  Outcome: Progressing     Problem: Chronic Conditions and Co-morbidities  Goal: Patient's chronic conditions and co-morbidity symptoms are monitored and maintained or improved  3/23/2023 0944 by Rubi Quigley RN  Outcome: Progressing  3/23/2023 0226 by Deepa Mobley RN  Outcome: Progressing

## 2023-03-23 NOTE — PROGRESS NOTES
Diabetes education completed by phone due to Covid infection. Patient seems knowledgeable regarding CHO foods and portion control. Watches how large her meals are on days of dialysis so she does not get ill. Takes a PB & Jelly sandwich with her and monitors BG levels by wearing Dexcome CGM. Reviewed chart states an increase in Latus to 24 units at HS. Prior to admission, she states she was on 20 units/HS. Humalog no changes per chart. She stated she did not need any supplies at home and she feels she does not need any formal education at this time. Thank you for this consult. 15 mins spent with this patient.  Electronically signed by Miah Montes RD, LD on 3/23/2023 at 2:40 PM

## 2023-03-23 NOTE — PROGRESS NOTES
was administered. Use Benadryl 25 mg IV prior to vancomycin administration and decrease the infusion rate of vancomycin. Shortness of breath primarily is due to pulmonary congestion rather than COVID infection. Continue dexamethasone 6 mg daily  Elevated procalcitonin level noted  Send CRP, respiratory culture, MRSA nares  Follow blood culture  We will continue to follow    Thank you for having us see this patient in consultation. I will be discussing this case with the treating physicians.       Electronically signed by Rosaura Zavala MD on 3/23/2023 at 5:43 PM

## 2023-03-23 NOTE — PROGRESS NOTES
Messaged Dr Chencho Zaragoza to notify patient had a reaction to vanc last night and medication had to be stopped.  Awaiting new orders     Katey Bassett RN  03/23/23  8:29 AM

## 2023-03-24 PROBLEM — Z99.81 O2 DEPENDENT: Status: ACTIVE | Noted: 2023-03-24

## 2023-03-24 LAB
ANION GAP SERPL CALCULATED.3IONS-SCNC: 16 MMOL/L (ref 7–16)
ANISOCYTOSIS: ABNORMAL
BASOPHILS # BLD: 0 E9/L (ref 0–0.2)
BASOPHILS NFR BLD: 0.1 % (ref 0–2)
BUN SERPL-MCNC: 25 MG/DL (ref 6–20)
CALCIUM SERPL-MCNC: 9.2 MG/DL (ref 8.6–10.2)
CHLORIDE SERPL-SCNC: 94 MMOL/L (ref 98–107)
CO2 SERPL-SCNC: 26 MMOL/L (ref 22–29)
CREAT SERPL-MCNC: 4.8 MG/DL (ref 0.5–1)
EOSINOPHIL # BLD: 0 E9/L (ref 0.05–0.5)
EOSINOPHIL NFR BLD: 0 % (ref 0–6)
ERYTHROCYTE [DISTWIDTH] IN BLOOD BY AUTOMATED COUNT: 16.1 FL (ref 11.5–15)
FOLATE SERPL-MCNC: 13.2 NG/ML (ref 4.8–24.2)
GLUCOSE SERPL-MCNC: 471 MG/DL (ref 74–99)
GLUCOSE SERPL-MCNC: 534 MG/DL (ref 74–99)
HCT VFR BLD AUTO: 26.3 % (ref 34–48)
HGB BLD-MCNC: 8.5 G/DL (ref 11.5–15.5)
HYPOCHROMIA: ABNORMAL
IRON SATN MFR SERPL: 40 % (ref 15–50)
IRON SERPL-MCNC: 78 MCG/DL (ref 37–145)
LYMPHOCYTES # BLD: 0.43 E9/L (ref 1.5–4)
LYMPHOCYTES NFR BLD: 6.1 % (ref 20–42)
MCH RBC QN AUTO: 31.4 PG (ref 26–35)
MCHC RBC AUTO-ENTMCNC: 32.3 % (ref 32–34.5)
MCV RBC AUTO: 97 FL (ref 80–99.9)
METER GLUCOSE: 157 MG/DL (ref 74–99)
METER GLUCOSE: 245 MG/DL (ref 74–99)
METER GLUCOSE: 319 MG/DL (ref 74–99)
METER GLUCOSE: >500 MG/DL (ref 74–99)
MONOCYTES # BLD: 0.36 E9/L (ref 0.1–0.95)
MONOCYTES NFR BLD: 5.3 % (ref 2–12)
MRSA SPEC QL CULT: NORMAL
MYELOCYTES NFR BLD MANUAL: 0.9 % (ref 0–0)
NEUTROPHILS # BLD: 6.41 E9/L (ref 1.8–7.3)
NEUTS SEG NFR BLD: 87.7 % (ref 43–80)
OVALOCYTES: ABNORMAL
PHOSPHATE SERPL-MCNC: 4.1 MG/DL (ref 2.5–4.5)
PLATELET # BLD AUTO: 236 E9/L (ref 130–450)
PMV BLD AUTO: 10.4 FL (ref 7–12)
POIKILOCYTES: ABNORMAL
POLYCHROMASIA: ABNORMAL
POTASSIUM SERPL-SCNC: 4 MMOL/L (ref 3.5–5)
RBC # BLD AUTO: 2.71 E12/L (ref 3.5–5.5)
SODIUM SERPL-SCNC: 136 MMOL/L (ref 132–146)
TARGET CELLS: ABNORMAL
TIBC SERPL-MCNC: 196 MCG/DL (ref 250–450)
TSH SERPL-MCNC: 2.9 UIU/ML (ref 0.27–4.2)
VANCOMYCIN SERPL-MCNC: 18.9 MCG/ML (ref 5–40)
VIT B12 SERPL-MCNC: 1021 PG/ML (ref 211–946)
VITAMIN D 25-HYDROXY: 64 NG/ML (ref 30–100)
WBC # BLD: 7.2 E9/L (ref 4.5–11.5)

## 2023-03-24 PROCEDURE — 6370000000 HC RX 637 (ALT 250 FOR IP): Performed by: INTERNAL MEDICINE

## 2023-03-24 PROCEDURE — 94660 CPAP INITIATION&MGMT: CPT

## 2023-03-24 PROCEDURE — S5553 INSULIN LONG ACTING 5 U: HCPCS | Performed by: INTERNAL MEDICINE

## 2023-03-24 PROCEDURE — 84100 ASSAY OF PHOSPHORUS: CPT

## 2023-03-24 PROCEDURE — 90945 DIALYSIS ONE EVALUATION: CPT

## 2023-03-24 PROCEDURE — 82746 ASSAY OF FOLIC ACID SERUM: CPT

## 2023-03-24 PROCEDURE — 82947 ASSAY GLUCOSE BLOOD QUANT: CPT

## 2023-03-24 PROCEDURE — 94640 AIRWAY INHALATION TREATMENT: CPT

## 2023-03-24 PROCEDURE — 2700000000 HC OXYGEN THERAPY PER DAY

## 2023-03-24 PROCEDURE — 2580000003 HC RX 258: Performed by: INTERNAL MEDICINE

## 2023-03-24 PROCEDURE — 84443 ASSAY THYROID STIM HORMONE: CPT

## 2023-03-24 PROCEDURE — 85025 COMPLETE CBC W/AUTO DIFF WBC: CPT

## 2023-03-24 PROCEDURE — 1200000000 HC SEMI PRIVATE

## 2023-03-24 PROCEDURE — 6360000002 HC RX W HCPCS: Performed by: INTERNAL MEDICINE

## 2023-03-24 PROCEDURE — 82607 VITAMIN B-12: CPT

## 2023-03-24 PROCEDURE — 82962 GLUCOSE BLOOD TEST: CPT

## 2023-03-24 PROCEDURE — 83540 ASSAY OF IRON: CPT

## 2023-03-24 PROCEDURE — 80048 BASIC METABOLIC PNL TOTAL CA: CPT

## 2023-03-24 PROCEDURE — 2500000003 HC RX 250 WO HCPCS: Performed by: INTERNAL MEDICINE

## 2023-03-24 PROCEDURE — 83550 IRON BINDING TEST: CPT

## 2023-03-24 PROCEDURE — 36415 COLL VENOUS BLD VENIPUNCTURE: CPT

## 2023-03-24 PROCEDURE — 82306 VITAMIN D 25 HYDROXY: CPT

## 2023-03-24 PROCEDURE — 80202 ASSAY OF VANCOMYCIN: CPT

## 2023-03-24 RX ORDER — DOXYCYCLINE HYCLATE 100 MG/1
100 CAPSULE ORAL EVERY 12 HOURS SCHEDULED
Status: DISCONTINUED | OUTPATIENT
Start: 2023-03-24 | End: 2023-03-25 | Stop reason: HOSPADM

## 2023-03-24 RX ORDER — INSULIN LISPRO 100 [IU]/ML
0-4 INJECTION, SOLUTION INTRAVENOUS; SUBCUTANEOUS NIGHTLY
Status: DISCONTINUED | OUTPATIENT
Start: 2023-03-24 | End: 2023-03-25 | Stop reason: HOSPADM

## 2023-03-24 RX ORDER — LEVOFLOXACIN 750 MG/1
750 TABLET ORAL EVERY OTHER DAY
Status: DISCONTINUED | OUTPATIENT
Start: 2023-03-24 | End: 2023-03-25 | Stop reason: HOSPADM

## 2023-03-24 RX ORDER — GUAIFENESIN 600 MG/1
1200 TABLET, EXTENDED RELEASE ORAL 2 TIMES DAILY
Qty: 40 TABLET | Refills: 0 | Status: SHIPPED | OUTPATIENT
Start: 2023-03-24 | End: 2023-04-03

## 2023-03-24 RX ORDER — INSULIN LISPRO 100 [IU]/ML
1 INJECTION, SOLUTION INTRAVENOUS; SUBCUTANEOUS
Qty: 1 ADJUSTABLE DOSE PRE-FILLED PEN SYRINGE | Refills: 0
Start: 2023-03-24

## 2023-03-24 RX ORDER — DOXYCYCLINE HYCLATE 100 MG
100 TABLET ORAL EVERY 12 HOURS SCHEDULED
Qty: 14 TABLET | Refills: 0 | Status: SHIPPED | OUTPATIENT
Start: 2023-03-24 | End: 2023-03-31

## 2023-03-24 RX ORDER — INSULIN LISPRO 100 [IU]/ML
0-16 INJECTION, SOLUTION INTRAVENOUS; SUBCUTANEOUS
Status: DISCONTINUED | OUTPATIENT
Start: 2023-03-24 | End: 2023-03-25 | Stop reason: HOSPADM

## 2023-03-24 RX ORDER — DIPHENHYDRAMINE HYDROCHLORIDE 50 MG/ML
25 INJECTION INTRAMUSCULAR; INTRAVENOUS PRN
Status: DISCONTINUED | OUTPATIENT
Start: 2023-03-24 | End: 2023-03-25 | Stop reason: HOSPADM

## 2023-03-24 RX ORDER — LEVOFLOXACIN 750 MG/1
750 TABLET ORAL EVERY OTHER DAY
Qty: 4 TABLET | Refills: 0 | Status: SHIPPED | OUTPATIENT
Start: 2023-03-24 | End: 2023-03-31

## 2023-03-24 RX ORDER — PSEUDOEPHED/ACETAMINOPH/DIPHEN 30MG-500MG
TABLET ORAL
Qty: 120 TABLET | Refills: 0 | Status: SHIPPED | OUTPATIENT
Start: 2023-03-24

## 2023-03-24 RX ORDER — INSULIN GLARGINE-YFGN 100 [IU]/ML
28 INJECTION, SOLUTION SUBCUTANEOUS NIGHTLY
Status: DISCONTINUED | OUTPATIENT
Start: 2023-03-24 | End: 2023-03-25 | Stop reason: HOSPADM

## 2023-03-24 RX ORDER — LACTOBACILLUS RHAMNOSUS GG 10B CELL
1 CAPSULE ORAL 2 TIMES DAILY
Qty: 60 CAPSULE | Refills: 0 | Status: SHIPPED | OUTPATIENT
Start: 2023-03-24 | End: 2023-04-23

## 2023-03-24 RX ORDER — BENZONATATE 100 MG/1
100 CAPSULE ORAL 3 TIMES DAILY
Qty: 21 CAPSULE | Refills: 0 | Status: SHIPPED | OUTPATIENT
Start: 2023-03-24 | End: 2023-03-31

## 2023-03-24 RX ADMIN — CARVEDILOL 25 MG: 25 TABLET, FILM COATED ORAL at 20:59

## 2023-03-24 RX ADMIN — BENZONATATE 100 MG: 100 CAPSULE ORAL at 20:59

## 2023-03-24 RX ADMIN — BENZONATATE 100 MG: 100 CAPSULE ORAL at 14:47

## 2023-03-24 RX ADMIN — SODIUM CHLORIDE, PRESERVATIVE FREE 10 ML: 5 INJECTION INTRAVENOUS at 20:59

## 2023-03-24 RX ADMIN — Medication 1 CAPSULE: at 20:59

## 2023-03-24 RX ADMIN — INSULIN LISPRO 10 UNITS: 100 INJECTION, SOLUTION INTRAVENOUS; SUBCUTANEOUS at 11:44

## 2023-03-24 RX ADMIN — INSULIN LISPRO 10 UNITS: 100 INJECTION, SOLUTION INTRAVENOUS; SUBCUTANEOUS at 16:54

## 2023-03-24 RX ADMIN — Medication 5 MG: at 20:59

## 2023-03-24 RX ADMIN — IPRATROPIUM BROMIDE AND ALBUTEROL SULFATE 1 AMPULE: .5; 2.5 SOLUTION RESPIRATORY (INHALATION) at 12:51

## 2023-03-24 RX ADMIN — INSULIN LISPRO 8 UNITS: 100 INJECTION, SOLUTION INTRAVENOUS; SUBCUTANEOUS at 06:29

## 2023-03-24 RX ADMIN — HEPARIN SODIUM 5000 UNITS: 10000 INJECTION INTRAVENOUS; SUBCUTANEOUS at 11:40

## 2023-03-24 RX ADMIN — INSULIN GLARGINE-YFGN 28 UNITS: 100 INJECTION, SOLUTION SUBCUTANEOUS at 22:06

## 2023-03-24 RX ADMIN — HYDRALAZINE HYDROCHLORIDE 50 MG: 50 TABLET, FILM COATED ORAL at 14:47

## 2023-03-24 RX ADMIN — Medication 1 CAPSULE: at 14:47

## 2023-03-24 RX ADMIN — FAMOTIDINE 20 MG: 20 TABLET ORAL at 11:39

## 2023-03-24 RX ADMIN — CARVEDILOL 50 MG: 25 TABLET, FILM COATED ORAL at 11:39

## 2023-03-24 RX ADMIN — GUAIFENESIN 400 MG: 400 TABLET ORAL at 14:47

## 2023-03-24 RX ADMIN — NEPHROCAP 1 MG: 1 CAP ORAL at 11:40

## 2023-03-24 RX ADMIN — CALCIUM ACETATE 2668 MG: 667 CAPSULE ORAL at 16:49

## 2023-03-24 RX ADMIN — DOXYCYCLINE HYCLATE 100 MG: 100 CAPSULE ORAL at 20:59

## 2023-03-24 RX ADMIN — LEVOFLOXACIN 750 MG: 750 TABLET, FILM COATED ORAL at 16:50

## 2023-03-24 RX ADMIN — DIPHENHYDRAMINE HYDROCHLORIDE 25 MG: 50 INJECTION, SOLUTION INTRAMUSCULAR; INTRAVENOUS at 16:00

## 2023-03-24 RX ADMIN — INSULIN LISPRO 4 UNITS: 100 INJECTION, SOLUTION INTRAVENOUS; SUBCUTANEOUS at 11:43

## 2023-03-24 RX ADMIN — HYDRALAZINE HYDROCHLORIDE 50 MG: 50 TABLET, FILM COATED ORAL at 06:29

## 2023-03-24 RX ADMIN — IPRATROPIUM BROMIDE AND ALBUTEROL SULFATE 1 AMPULE: .5; 2.5 SOLUTION RESPIRATORY (INHALATION) at 21:09

## 2023-03-24 RX ADMIN — INSULIN LISPRO 4 UNITS: 100 INJECTION, SOLUTION INTRAVENOUS; SUBCUTANEOUS at 22:06

## 2023-03-24 RX ADMIN — CALCIUM ACETATE 2668 MG: 667 CAPSULE ORAL at 12:04

## 2023-03-24 RX ADMIN — SODIUM CHLORIDE, PRESERVATIVE FREE 10 ML: 5 INJECTION INTRAVENOUS at 11:40

## 2023-03-24 RX ADMIN — CALCIUM ACETATE 2668 MG: 667 CAPSULE ORAL at 06:35

## 2023-03-24 RX ADMIN — INSULIN LISPRO 10 UNITS: 100 INJECTION, SOLUTION INTRAVENOUS; SUBCUTANEOUS at 06:31

## 2023-03-24 RX ADMIN — BUDESONIDE 500 MCG: 0.5 SUSPENSION RESPIRATORY (INHALATION) at 21:10

## 2023-03-24 RX ADMIN — GUAIFENESIN 400 MG: 400 TABLET ORAL at 20:59

## 2023-03-24 RX ADMIN — HYDRALAZINE HYDROCHLORIDE 50 MG: 50 TABLET, FILM COATED ORAL at 20:59

## 2023-03-24 NOTE — PROGRESS NOTES
Medical service company called inquiring when oxygen will be delivered since no phone call has been made to the unit to notify of delivery time. Per Jan, the person in charge of obtaining the oxygen order had called and left a VM at 4301 stating they need additional information regarding the need for the oxygen. Explained to her that no one has called the unit and that this is not a number for this unit. She then took the phone number for the unit and states that she will have the representative call back for the additional information that is needed.   I proceeded to explain to her that this patient has been waiting for over 4hrs for the oxygen and has been discharged and this needs to be expedited for the patient to be discharged and the need for beds in the hospital.

## 2023-03-24 NOTE — CARE COORDINATION
This CM notified medical service company via phone [phone], spoke with Jose Alejandro Ward whom provided (f) [phone] for order, ambulatory pulse ox, and facesheet; all requested information faxed to the aforementioned fax #, per Jose Alejandro Ward they will notify pt and charge RN for delivery time as she is aware pt is discharge; envelope and ambulette form completed in soft-chart for discharge transport to [address] [place] [address] (where pt's truck is parked), will need set-up once oxygen is delivered.

## 2023-03-24 NOTE — DISCHARGE SUMMARY
than COVID 19  Discharge on levoquin and doxycyline per ID  Discharge on 3 L O2     ESRD on HD TTS with HD noncompliance      Uncontrolled Diabetes Mellitus, type 1  A1c 7.3  Diabetes ED completed  continue home medication regimen including lantus, increase 24 units QHS, cont humalog 10 units TIDAC  Added ISS on discharge     Chest Pressure / Elevated Troponin  Trop trend not consistent with NSTEMI     Noncompliance     Discharge plan of care was discussed with: pt, RN, CM    Discharge Diagnoses:   Patient Active Problem List   Diagnosis    Type 1 diabetes mellitus with chronic kidney disease on chronic dialysis ([facility]    ESRD (end stage renal disease) on dialysis ([facility]    HTN (hypertension), benign    Uncontrolled type 1 diabetes mellitus with hyperglycemia (HCC)    Chronic deep vein thrombosis (DVT) of right upper extremity (HCC)    Facial cellulitis    Acute hyperkalemia    Nasal polyp    Periorbital cellulitis of right eye    Periorbital cellulitis    Vitamin B deficiency, unspecified    Abscess    Nasal congestion    Dry cough    Hidradenitis suppurativa    Symptomatic anemia    Anemia    Acute congestive heart failure (HCC)    Elevated troponin    Gall stone    Non-compliance    Metabolic encephalopathy    Hypoxia    Hypotension    History of venous thromboembolism    Dental abscess    Anemia of chronic renal failure, stage 5 (HCC)    Chronic systolic (congestive) heart failure    Acute respiratory failure (HCC)    Acute hypoxemic respiratory failure (HCC)    COVID-19    SIRS (systemic inflammatory response syndrome) (HCC)    Elevated procalcitonin    Chest pressure    MRSA colonization    O2 dependent       Discharge Medications:   Current Discharge Medication List             Details   lactobacillus (CULTURELLE) CAPS capsule Take 1 capsule by mouth in the morning and at bedtime  Qty: 60 capsule, Refills: 0      benzonatate (TESSALON) 100 MG capsule Take 1 capsule by mouth 3 times daily for 7 days  Qty: 21 Glargine, 2 Unit Dial, (TOUJEO MAX SOLOSTAR) 300 UNIT/ML SOPN Inject 20 Units into the skin nightly      !! carvedilol (COREG) 25 MG tablet Take 50 mg by mouth every morning  Refills: 5       !! - Potential duplicate medications found. Please discuss with provider. Follow-up: PCP     Discharge Condition: stable    Discharge instruction:   Patient instructed to return to the emergency department if: Chest pain, shortness of breath, altered mental status, fever, chills, nausea, vomiting, abdominal pain or any other concerns. Activity: progressive as tolerated.     Diet: diabetic diet and renal diet     Wound Care: none needed     Consults: ID and nephrology       Discharge Physical Exam:   See progress note from earlier today      Garcia Damon MD   03/24/23 7:42 PM    Hospital Medicine   Time Spent: 55 min

## 2023-03-24 NOTE — DISCHARGE INSTRUCTIONS
Your information:  Name: Abad Lewis  : 1985    Your instructions:          What to do after you leave the hospital:    Recommended diet: renal diet    Recommended activity: activity as tolerated        The following personal items were collected during your admission and were returned to you:    Belongings  Dental Appliances: None  Vision - Corrective Lenses: None  Hearing Aid: None  Clothing: Footwear, Pants, Socks, Undergarments  Jewelry: None  Body Piercings Removed: N/A  Electronic Devices: Cell Phone,   Weapons (Notify Protective Services/Security): None  Other Valuables: Purse, At bedside  Home Medications: None  Valuables Given To: Patient  Provide Name(s) of Who Valuable(s) Were Given To: na  Responsible person(s) in the waiting room: na  Patient approves for provider to speak to responsible person post operatively: Yes    Information obtained by:  By signing below, I understand that if any problems occur once I leave the hospital I am to contact Dr. Kaela Anglin. I understand and acknowledge receipt of the instructions indicated above.

## 2023-03-24 NOTE — PLAN OF CARE
Problem: Metabolic/Fluid and Electrolytes - Adult  Goal: Hemodynamic stability and optimal renal function maintained  3/23/2023 1826 by Kingsley Thomas RN  Outcome: Progressing     Problem: Cardiovascular - Adult  Goal: Maintains optimal cardiac output and hemodynamic stability  3/24/2023 0117 by Nancy Valencia RN  Outcome: Progressing  3/23/2023 1826 by Kingsley Thomas RN  Outcome: Progressing     Problem: Chronic Conditions and Co-morbidities  Goal: Patient's chronic conditions and co-morbidity symptoms are monitored and maintained or improved  3/24/2023 0117 by Nancy Valencia RN  Outcome: Progressing  3/24/2023 0116 by Nancy Valencia RN  Outcome: Progressing  3/23/2023 1826 by Kingsley Thomas RN  Outcome: Progressing

## 2023-03-24 NOTE — PROGRESS NOTES
[facility]  Nephrology Progress Note    Patient's Name: Tyshawn Stanley    History of Present Illness from 3/22 consult note:    Tyshawn Stanley is a 44 y.o. female with a past medical history of diabetes mellitus, ESRD, and hypertension. A full consult is deferred at this time as patient is well-known to our service. She presented to the ED on 3/22 with complaints of shortness of breath. Vital signs on 3/22 include temperature 97.8, respirations 18, pulse 108, /73, and she was 97% on 4 L. Lab data on 3/22 includes potassium 5.2, BUN 80, creatinine 17.7, anion gap 20, procalcitonin 1.18, proBNP 31,843, troponin 232, and hemoglobin 8.7. She was found to be COVID-positive. She had a chest x-ray on 3/22 which showed new widespread airspace disease. We were consulted to see the patient for missed dialysis. Patient is known to our service and dialyzes at [facility] second shift via left arm AV fistula. At present, patient was seen and examined. She is wearing the BiPAP. She reports that she came in due to shortness of breath. She denies any abdominal pain, nausea, vomiting, or diarrhea. \"    Subjective:    3/24: Patient was not personally seen and examined due to COVID isolation. Chart reviewed. Discussed with dialysis staff.     PMH:    Past Medical History:   Diagnosis Date    JOLLY (acute kidney injury) ([address] 4/5/2016    AVF (arteriovenous fistula) ([facility] 02/19/2018    let arm    Chronic kidney disease     Dialysis AV fistula malfunction, initial encounter ([address] 11/7/2019    DM type 1 (diabetes mellitus, type 1) ([address]     Encounter regarding vascular access for dialysis for ESRD ([address] 07/12/2018    ESRD (end stage renal disease) ([address]     Hemodialysis patient ([address]     amrita dawson mon wed fri / graft in left arm    Hypertension     Tobacco abuse 4/5/2016       Patient Active Problem List   Diagnosis    Type 1 diabetes mellitus with chronic kidney disease on chronic Weight   03/24/23 0800 (!) 158/73 -- -- 93 -- -- --   03/24/23 0747 (!) 140/51 -- -- 93 -- -- --   03/24/23 0742 (!) 140/69 99 °F (37.2 °C) -- 93 -- -- 151 lb 3.8 oz (68.6 kg)   03/24/23 0629 137/64 -- -- 95 -- -- --   03/24/23 0415 -- -- -- -- -- 98 % --   03/24/23 0350 -- -- -- -- -- 93 % --   03/24/23 0342 -- -- -- -- -- 93 % --   03/24/23 0341 -- -- -- -- -- 98 % --   03/24/23 0340 -- -- -- -- -- (!) 81 % --   03/23/23 2130 (!) 147/67 98.1 °F (36.7 °C) Oral 95 20 94 % --   03/23/23 1448 (!) 111/56 97.1 °F (36.2 °C) Temporal 94 20 98 % --   03/23/23 1345 (!) 106/54 98.7 °F (37.1 °C) Temporal 97 19 95 % --   03/23/23 1311 (!) 143/64 97.7 °F (36.5 °C) -- 99 20 -- 149 lb 14.6 oz (68 kg)   03/23/23 1200 (!) 167/76 -- -- 100 -- -- --   03/23/23 1130 139/69 -- -- (!) 103 -- -- --   03/23/23 1100 136/69 -- -- 100 -- -- --   03/23/23 1030 (!) 144/74 -- -- (!) 103 -- -- --   03/23/23 1000 (!) 164/83 -- -- (!) 103 -- -- --   03/23/23 0928 130/66 97.8 °F (36.6 °C) -- (!) 101 20 -- 154 lb 12.2 oz (70.2 kg)   03/23/23 0920 132/64 -- -- (!) 101 -- -- --           Intake/Output Summary (Last 24 hours) at 3/24/2023 0833  Last data filed at 3/23/2023 1825  Gross per 24 hour   Intake 640 ml   Output 2500 ml   Net -1860 ml       Due to the current efforts to prevent transmission of COVID-19 and also the need to preserve PPE for other caregivers, a face-to-face encounter with the patient was not performed. That being said, all relevant records and diagnostic tests were reviewed, including laboratory results and imaging. Please reference any relevant documentation elsewhere. Care will be coordinated with the primary service.      Data:    Recent Labs     03/22/23  1125 03/23/23  0519 03/24/23  0515   WBC 5.8 7.4 7.2   HGB 8.7* 8.5* 8.5*   HCT 27.0* 25.9* 26.3*   MCV 98.2 97.0 97.0    230 236         Recent Labs     03/22/23  2320 03/23/23  0519 03/24/23  0515 03/24/23  0719    138 136  --    K 4.4 4.6 4.0  --    CL 85*

## 2023-03-24 NOTE — PROGRESS NOTES
Left 7/17/2018    REVISION AV FISTULA - LEFT ARM performed by Pricilla Woodward MD at [address] NON-TUNNELED CENTRAL VENOUS CATH AGE 4 YR/> N/A 5/10/2018    TESIO CATHETER INSERTION performed by Katt Do MD at [address] [facility] PRERETINAL MEMBRANE Left 8/28/2018    PARS PLANA VITRECTOMY 25 GAUGE, VITREOUS HEMORRHAGE REMOVAL, ENDO LASER, AIR/FLUID  EXCHANGE LEFT EYE performed by Flakita Knowles MD at [address] [facility] N/A 1/29/2021    PERINEAL ABCESS INCISION AND DRAINAGE (LITHOTOMY) performed by Lawrence Powell MD at [address]     Current Medications:   Scheduled Meds:   insulin lispro  0-16 Units SubCUTAneous TID WC    insulin lispro  0-4 Units SubCUTAneous Nightly    insulin glargine-yfgn  28 Units SubCUTAneous Nightly    levoFLOXacin  750 mg Oral Every Other Day    doxycycline hyclate  100 mg Oral 2 times per day    lactobacillus  1 capsule Oral TID    melatonin  5 mg Oral Nightly    benzonatate  100 mg Oral TID    budesonide  0.5 mg Nebulization BID    ipratropium-albuterol  1 ampule Inhalation Q4H WA    guaiFENesin  400 mg Oral TID    Virt-Caps  1 mg Oral Daily    calcium acetate  4 capsule Oral TID     carvedilol  25 mg Oral Nightly    carvedilol  50 mg Oral QAM    famotidine  20 mg Oral QAM    hydrALAZINE  50 mg Oral 3 times per day    insulin lispro  10 Units SubCUTAneous TID     sodium chloride flush  5-40 mL IntraVENous 2 times per day    heparin (porcine)  5,000 Units SubCUTAneous Q12H     Continuous Infusions:   dextrose      sodium chloride       PRN Meds:diphenhydrAMINE, acetaminophen **OR** [DISCONTINUED] acetaminophen, melatonin, albuterol, glucose, dextrose bolus **OR** dextrose bolus, glucagon (rDNA), dextrose, sodium chloride flush, sodium chloride, ondansetron **OR** ondansetron, polyethylene glycol    Allergies:  Patient has no known allergies.     Social History:   Social History     Socioeconomic History    Marital status:  03/23/2023 05:19 AM    ALT 15 03/23/2023 05:19 AM       Lab Results   Component Value Date/Time    PROTIME 13.6 02/11/2023 02:24 PM    INR 1.2 02/11/2023 02:24 PM       Lab Results   Component Value Date/Time    TSH 2.900 03/24/2023 05:15 AM       Lab Results   Component Value Date/Time    COLORU Yellow 11/23/2017 10:05 PM    PHUR 8.5 11/23/2017 10:05 PM    LABCAST FEW 09/29/2013 02:15 PM    LABCAST FEW 09/29/2013 02:15 PM    WBCUA 2-5 11/23/2017 10:05 PM    WBCUA NONE 11/21/2010 10:30 PM    RBCUA 5-10 11/23/2017 10:05 PM    RBCUA 0-1 09/29/2013 02:15 PM    TRICHOMONAS RARE 08/16/2016 11:05 PM    BACTERIA RARE 11/23/2017 10:05 PM    CLARITYU Clear 11/23/2017 10:05 PM    SPECGRAV 1.015 11/23/2017 10:05 PM    LEUKOCYTESUR Negative 11/23/2017 10:05 PM    UROBILINOGEN 0.2 11/23/2017 10:05 PM    BILIRUBINUR Negative 11/23/2017 10:05 PM    BILIRUBINUR NEGATIVE 11/21/2010 10:30 PM    BLOODU MODERATE 11/23/2017 10:05 PM    GLUCOSEU 500 11/23/2017 10:05 PM    GLUCOSEU >=1000 11/21/2010 10:30 PM       No results found for: GKJ5ZTX, BEART, W1FDWVVH, PHART, THGBART, IBF6XKV, PO2ART, PAU8RXP  Radiology:  XR CHEST PORTABLE   Final Result   New widespread airspace disease which could represent cardiogenic or   noncardiogenic edema. Microbiology:  Pending  Recent Labs     03/22/23  1401   BC 24 Hours no growth     No results for input(s): ORG in the last 72 hours. Recent Labs     03/22/23  1401   BLOODCULT2 24 Hours no growth     No results for input(s): STREPNEUMAGU in the last 72 hours. No results for input(s): LP1UAG in the last 72 hours. No results for input(s): ASO in the last 72 hours. No results for input(s): CULTRESP in the last 72 hours.     Assessment:  Acute hypoxic respiratory failure on nasal cannula 4 L  Bilateral pulmonary congestion  SARS-COVID-2 pneumonia  ESRD on HD    Plan:    DC cefepime and Vanco.  Patient can be discharged with levofloxacin 750 mg every other day and doxycycline 100 every 12 for

## 2023-03-24 NOTE — TELEPHONE ENCOUNTER
Last Appointment:  11/30/2022  Future Appointments   Date Time Provider Titus Potter   3/31/2023 11:15 AM Chandrika Elena  [address] [facility]   5/24/2023  2:00 PM Minerva Costa MD [facility]

## 2023-03-24 NOTE — FLOWSHEET NOTE
03/24/23 1111   Vital Signs   /68   Temp 99 °F (37.2 °C)   Heart Rate 99   Weight 144 lb 10 oz (65.6 kg)   Weight Method Bed scale   Percent Weight Change -4.37   Post-Hemodialysis Assessment   Post-Treatment Procedures Blood returned; Access bleeding time < 10 minutes   Machine Disinfection Process Exterior Machine Disinfection   Rinseback Volume (ml) 300 ml   Dialyzer Clearance Lightly streaked   Duration of Treatment (minutes) 180 minutes   Hemodialysis Intake (ml) 300 ml   Hemodialysis Output (ml) 3300 ml   NET Removed (ml) 3000   Tolerated Treatment Good   Patient Response to Treatment pt tx ended , blood rinsed back, pt tolerated tx well,pt transported back to her room via bed   Time Off 1047   Patient Disposition Return to room

## 2023-03-24 NOTE — PROGRESS NOTES
ondansetron, polyethylene glycol    I/O    Intake/Output Summary (Last 24 hours) at 3/24/2023 1305  Last data filed at 3/24/2023 1111  Gross per 24 hour   Intake 940 ml   Output 5800 ml   Net -4860 ml       Labs:   Recent Labs     03/22/23  1125 03/23/23  0519 03/24/23  0515   WBC 5.8 7.4 7.2   HGB 8.7* 8.5* 8.5*   HCT 27.0* 25.9* 26.3*    230 236       Recent Labs     03/22/23  2320 03/23/23  0519 03/24/23  0515 03/24/23  0719    138 136  --    K 4.4 4.6 4.0  --    CL 85* 91* 94*  --    CO2 20* 22 26  --    BUN 31* 40* 25*  --    CREATININE 7.6* 8.6* 4.8*  --    CALCIUM 9.3 9.3 9.2  --    PHOS  --  7.3*  --  4.1       Recent Labs     03/22/23  1125 03/23/23  0519   PROT 7.7 7.2   ALKPHOS 111* 107*   ALT 17 15   AST 33* 20   BILITOT 1.2 0.6       No results for input(s): INR in the last 72 hours. No results for input(s): Carleen Barksdaleel in the last 72 hours. Chronic labs:  Lab Results   Component Value Date    CHOL 155 10/08/2013    TRIG 68 10/08/2013    HDL 40 01/29/2021    LDLCALC 41 01/29/2021    TSH 2.900 03/24/2023    INR 1.2 02/11/2023    LABA1C 7.3 (H) 03/23/2023       Radiology:  Imaging studies reviewed today. Subjective:     Haze Overcast feeling much better today    Objective:     Physical Exam:   BP (!) 118/57   Pulse 94   Temp 98.3 °F (36.8 °C) (Temporal)   Resp 18   Wt 144 lb 10 oz (65.6 kg)   SpO2 94%   BMI 29.21 kg/m²     General appearance:in no distress. semisupine  Lungs: diminished in bases bilaterally, no wheezing or crackles   Heart: Regular rate and rhythm, S1, S2 normal   Abdomen: Soft, non-tender and not-distended.  Bowel sounds normal.   Extremities: no edema   Neurologic: Grossly normal and non focal, CN II-XII intact       Beltran Mcclendon MD   Hospitalist Service   03/24/23 1:05 PM

## 2023-03-24 NOTE — PLAN OF CARE
Problem: Discharge Planning  Goal: Discharge to home or other facility with appropriate resources  Outcome: Progressing     Problem: Chronic Conditions and Co-morbidities  Goal: Patient's chronic conditions and co-morbidity symptoms are monitored and maintained or improved  Outcome: Progressing     Problem: Cardiovascular - Adult  Goal: Maintains optimal cardiac output and hemodynamic stability  Outcome: Progressing     Problem: Metabolic/Fluid and Electrolytes - Adult  Goal: Hemodynamic stability and optimal renal function maintained  Outcome: Progressing

## 2023-03-24 NOTE — PROGRESS NOTES
Patient up walking around room with O2 on. SpO2 checked on room air, patient was 81%. 4L NC placed back on patient and recovered to 98% instantly. Patient took NC off again and went down to 92% on room air while sitting on side of bed and maintained 92% on room air while talking for 8 minutes. 18  RN checked on patient, patient found sleeping in bed with NC in nose with 4L.  SpO2 98% on 4L NC.

## 2023-03-24 NOTE — PROGRESS NOTES
Pulse Ox on room air sitting SPO2 95%  Pulse ox on room air ambulating SPO2 84%  Pulse Ox on 3LNC Ambulating SPO2 94%  Pulse Ox on 3LNC sitting recovery SPO2 96%

## 2023-03-24 NOTE — PROGRESS NOTES
Pharmacy Consultation Note  (Antibiotic Dosing and Monitoring)    Initial consult date: 03/22/23  Consulting physician/provider: Dr. Marina Cavazos  Drug: Vancomycin  Indication: HAP    Age/  Gender Height Weight IBW  Allergy Information   37 y.o./female 4'11\" 150 lb 11.2 oz (68.4 kg)     Ideal body weight: 48.8 kg (107 lb 8.4 oz)  Adjusted ideal body weight: 56.7 kg (125 lb 0.1 oz)   Patient has no known allergies. Renal Function:  Recent Labs     03/22/23 2320 03/23/23  0519 03/24/23  0515   BUN 31* 40* 25*   CREATININE 7.6* 8.6* 4.8*         Intake/Output Summary (Last 24 hours) at 3/24/2023 0849  Last data filed at 3/23/2023 1825  Gross per 24 hour   Intake 640 ml   Output 2500 ml   Net -1860 ml         Vancomycin Monitoring:  Trough:  No results for input(s): VANCOTROUGH in the last 72 hours. Random:    Recent Labs     03/23/23 0519 03/24/23  0515   VANCORANDOM 5.2 18.9         Recent Labs     03/22/23  1401   BLOODCULT2 24 Hours no growth          Historical Cultures:  Organism   Date Value Ref Range Status   02/11/2023 MRSA nasal colonization (A)  Final     Recent Labs     03/22/23  1401   BC 24 Hours no growth       Vancomycin Administration Times:  Recent vancomycin administrations                     vancomycin (VANCOCIN) 1,000 mg in sodium chloride 0.9 % 250 mL IVPB (Uhym2Dcc) (mg) 1,000 mg New Bag 03/23/23 1548    vancomycin (VANCOCIN) 1,250 mg in sodium chloride 0.9 % 250 mL IVPB (mg) 1,250 mg New Bag 03/23/23 0014                    Date  SCr/CrCl       or HD Drug/Dose Time   Given Level(s)   (Time)   03/22 HD x4 hrs Vancomycin 1250 mg IV x1 Partial dose given 5.2(0519)   3/23 HD x4 hrs Vanco 1000 mg x 1 1548    3/24 HD x 3 hrs Vanco 500 mg x 1 <1600> 18.9 (0515)            Assessment:  Patient is a 44 y.o. female who has been initiated on vancomycin  Estimated Creatinine Clearance: 14 mL/min (A) (based on SCr of 4.8 mg/dL (H)).   To dose vancomycin, pharmacy will be utilizing dosing based off of levels

## 2023-03-24 NOTE — PROGRESS NOTES
POC glucose check > 500, lab draw ordered STAT, awaiting results. 1814  Perfect serve message sent to Dr. Vilma Briseno. Still awaiting STAT lab draw for glucose, 18 units of Humalog to be given based off sliding scale and 10 units with meals. No new orders at this time.

## 2023-03-25 VITALS
TEMPERATURE: 97.3 F | WEIGHT: 138.01 LBS | BODY MASS INDEX: 27.87 KG/M2 | OXYGEN SATURATION: 100 % | HEART RATE: 92 BPM | SYSTOLIC BLOOD PRESSURE: 105 MMHG | DIASTOLIC BLOOD PRESSURE: 57 MMHG | RESPIRATION RATE: 18 BRPM

## 2023-03-25 LAB
ANION GAP SERPL CALCULATED.3IONS-SCNC: 17 MMOL/L (ref 7–16)
BUN SERPL-MCNC: 34 MG/DL (ref 6–20)
CALCIUM SERPL-MCNC: 9.5 MG/DL (ref 8.6–10.2)
CHLORIDE SERPL-SCNC: 94 MMOL/L (ref 98–107)
CO2 SERPL-SCNC: 26 MMOL/L (ref 22–29)
CREAT SERPL-MCNC: 6.8 MG/DL (ref 0.5–1)
ERYTHROCYTE [DISTWIDTH] IN BLOOD BY AUTOMATED COUNT: 16.1 FL (ref 11.5–15)
GLUCOSE SERPL-MCNC: 383 MG/DL (ref 74–99)
HCT VFR BLD AUTO: 28.5 % (ref 34–48)
HGB BLD-MCNC: 8.9 G/DL (ref 11.5–15.5)
MAGNESIUM SERPL-MCNC: 2.2 MG/DL (ref 1.6–2.6)
MCH RBC QN AUTO: 31.1 PG (ref 26–35)
MCHC RBC AUTO-ENTMCNC: 31.2 % (ref 32–34.5)
MCV RBC AUTO: 99.7 FL (ref 80–99.9)
METER GLUCOSE: 171 MG/DL (ref 74–99)
METER GLUCOSE: 343 MG/DL (ref 74–99)
METER GLUCOSE: 64 MG/DL (ref 74–99)
METER GLUCOSE: 67 MG/DL (ref 74–99)
PHOSPHATE SERPL-MCNC: 4.5 MG/DL (ref 2.5–4.5)
PLATELET # BLD AUTO: 239 E9/L (ref 130–450)
PMV BLD AUTO: 10 FL (ref 7–12)
POTASSIUM SERPL-SCNC: 4.1 MMOL/L (ref 3.5–5)
RBC # BLD AUTO: 2.86 E12/L (ref 3.5–5.5)
SODIUM SERPL-SCNC: 137 MMOL/L (ref 132–146)
VANCOMYCIN SERPL-MCNC: 15.2 MCG/ML (ref 5–40)
WBC # BLD: 9.2 E9/L (ref 4.5–11.5)

## 2023-03-25 PROCEDURE — 80202 ASSAY OF VANCOMYCIN: CPT

## 2023-03-25 PROCEDURE — 6370000000 HC RX 637 (ALT 250 FOR IP): Performed by: INTERNAL MEDICINE

## 2023-03-25 PROCEDURE — 85027 COMPLETE CBC AUTOMATED: CPT

## 2023-03-25 PROCEDURE — 84100 ASSAY OF PHOSPHORUS: CPT

## 2023-03-25 PROCEDURE — 94660 CPAP INITIATION&MGMT: CPT

## 2023-03-25 PROCEDURE — 2500000003 HC RX 250 WO HCPCS: Performed by: INTERNAL MEDICINE

## 2023-03-25 PROCEDURE — 82962 GLUCOSE BLOOD TEST: CPT

## 2023-03-25 PROCEDURE — 2580000003 HC RX 258: Performed by: INTERNAL MEDICINE

## 2023-03-25 PROCEDURE — 80048 BASIC METABOLIC PNL TOTAL CA: CPT

## 2023-03-25 PROCEDURE — 6360000002 HC RX W HCPCS: Performed by: INTERNAL MEDICINE

## 2023-03-25 PROCEDURE — 36415 COLL VENOUS BLD VENIPUNCTURE: CPT

## 2023-03-25 PROCEDURE — 90935 HEMODIALYSIS ONE EVALUATION: CPT

## 2023-03-25 PROCEDURE — 83735 ASSAY OF MAGNESIUM: CPT

## 2023-03-25 RX ADMIN — HYDRALAZINE HYDROCHLORIDE 50 MG: 50 TABLET, FILM COATED ORAL at 05:54

## 2023-03-25 RX ADMIN — NEPHROCAP 1 MG: 1 CAP ORAL at 09:44

## 2023-03-25 RX ADMIN — CALCIUM ACETATE 2668 MG: 667 CAPSULE ORAL at 17:44

## 2023-03-25 RX ADMIN — BENZONATATE 100 MG: 100 CAPSULE ORAL at 09:44

## 2023-03-25 RX ADMIN — CALCIUM ACETATE 2668 MG: 667 CAPSULE ORAL at 06:29

## 2023-03-25 RX ADMIN — SODIUM CHLORIDE, PRESERVATIVE FREE 10 ML: 5 INJECTION INTRAVENOUS at 09:45

## 2023-03-25 RX ADMIN — FAMOTIDINE 20 MG: 20 TABLET ORAL at 09:44

## 2023-03-25 RX ADMIN — HEPARIN SODIUM 5000 UNITS: 10000 INJECTION INTRAVENOUS; SUBCUTANEOUS at 09:48

## 2023-03-25 RX ADMIN — Medication 1 CAPSULE: at 09:45

## 2023-03-25 RX ADMIN — GUAIFENESIN 400 MG: 400 TABLET ORAL at 09:45

## 2023-03-25 RX ADMIN — CALCIUM ACETATE 2668 MG: 667 CAPSULE ORAL at 11:30

## 2023-03-25 RX ADMIN — DOXYCYCLINE HYCLATE 100 MG: 100 CAPSULE ORAL at 09:44

## 2023-03-25 RX ADMIN — CARVEDILOL 50 MG: 25 TABLET, FILM COATED ORAL at 09:47

## 2023-03-25 RX ADMIN — INSULIN LISPRO 10 UNITS: 100 INJECTION, SOLUTION INTRAVENOUS; SUBCUTANEOUS at 06:29

## 2023-03-25 RX ADMIN — Medication 16 G: at 12:59

## 2023-03-25 RX ADMIN — INSULIN LISPRO 12 UNITS: 100 INJECTION, SOLUTION INTRAVENOUS; SUBCUTANEOUS at 06:31

## 2023-03-25 NOTE — PROGRESS NOTES
Call received from supervisor Bernarda Hutchinson from [id number] [facility] stating that they called Devang Johnson and left a message that they needed a progress note from the DR stating the need for the oxygen and destating reasons in the progress note. Explained to Bernarda Hutchinson that we do not have a SW or CM named Arcenio Jessicarichie and the number [phone] is not part of our unit or our SW/CM numbers. She then stated that the rules of oxygen have changed for the patient's insurance company and they cannot bring the oxygen until the above noted progress note is faxed. Verified that the fax number is still the same that was given to the [address] earlier. Will reach out to Dr Krystyna River tomorrow for progress note and will fax to the provided number in hopes of patient receiving the required oxygen tomorrow.

## 2023-03-25 NOTE — FLOWSHEET NOTE
03/25/23 1657   Vital Signs   /60   Temp 97.2 °F (36.2 °C)   Heart Rate 87   Weight 138 lb 0.1 oz (62.6 kg)   Weight Method Bed scale   Percent Weight Change -4.57   Pain Assessment   Pain Assessment None - Denies Pain   Post-Hemodialysis Assessment   Post-Treatment Procedures Blood returned; Access bleeding time < 10 minutes   Machine Disinfection Process Acid/Vinegar Clean;Heat Disinfect   Rinseback Volume (ml) 300 ml   Dialyzer Clearance Lightly streaked   Duration of Treatment (minutes) 210 minutes   Hemodialysis Intake (ml) 300 ml   Hemodialysis Output (ml) 3300 ml   NET Removed (ml) 3000   Tolerated Treatment Good   Patient Response to Treatment Hd concluded. tolerated well. No overt distress. Blood returned per policy standards. Hemastasis achieved post decannulation of needles x 10 minutes. Dressings dry and intact upon exit from dialysis suite. droplet plus iso precautions maintained.    Bilateral Breath Sounds Diminished   RUE Edema +1   LUE Edema +1   RLE Edema +1   LLE Edema +1   Facial Edema Trace   Perineal Edema Trace

## 2023-03-25 NOTE — CARE COORDINATION
Medical services company ultimately a no show yesterday for oxygen. I do believe I can use another company since this is a dual product. [facility], subsequently spoke with patient, she is a smoker. Referral out to [facility] to see if they can dispense today. If not will try another agency. Updated patient that I am trying other companies. Unable to reach [facility], called [facility]. They will dispense portable tank to the room today. Added contact information to S for number to call once patient is home for concentrator. Faxed clinicals to [facility]. For questions I can be reached at [phone]. Adonis [place], [place]    The Plan for Transition of Care is related to the following treatment goals: discharge planning when stable     The Patient and/or patient representative Archana Lofton was provided with a choice of provider and agrees   with the discharge plan. [x] Yes [] No    Freedom of choice list was provided with basic dialogue that supports the patient's individualized plan of care/goals, treatment preferences and shares the quality data associated with the providers.  [x] Yes [] No

## 2023-03-25 NOTE — PROGRESS NOTES
chloride flush  5-40 mL IntraVENous 2 times per day    heparin (porcine)  5,000 Units SubCUTAneous Q12H        dextrose      sodium chloride         Meds prn:         Meds prior to admission:     No current facility-administered medications on file prior to encounter. Current Outpatient Medications on File Prior to Encounter   Medication Sig Dispense Refill    carvedilol (COREG) 25 MG tablet Take 25 mg by mouth at bedtime      insulin lispro (HUMALOG) 100 UNIT/ML SOLN injection vial Inject 10 Units into the skin 3 times daily (with meals) *Per Sliding Scale*      calcium acetate (PHOSLO) 667 MG CAPS capsule Take 4 capsules by mouth 3 times daily (with meals)      B Complex-C-Folic Acid (RENAL-JACOB) 0.8 MG TABS Take 1 tablet by mouth daily      mupirocin (BACTROBAN) 2 % cream Apply topically 3 times daily. 30 g 2    famotidine (PEPCID) 20 MG tablet TAKE 1 TABLET BY MOUTH EVERY MORNING 30 tablet 3    albuterol sulfate  (90 Base) MCG/ACT inhaler Inhale 2 puffs into the lungs every 4-6 hours as needed for Wheezing or Shortness of Breath 1 Inhaler 2    hydrALAZINE (APRESOLINE) 50 MG tablet Take 1 tablet by mouth every 8 hours 90 tablet 3    Insulin Glargine, 2 Unit Dial, (TOUJEO MAX SOLOSTAR) 300 UNIT/ML SOPN Inject 20 Units into the skin nightly      carvedilol (COREG) 25 MG tablet Take 50 mg by mouth every morning  5       Allergies:    Patient has no known allergies. Social History:     reports that she has been smoking cigarettes. She has never used smokeless tobacco. She reports that she does not currently use alcohol. She reports that she does not use drugs.     Family History:         Problem Relation Age of Onset    Stroke Mother     Cancer Father     Diabetes Paternal Aunt      Physical Exam:      Patient Vitals for the past 24 hrs:   BP Temp Temp src Pulse Resp SpO2 Weight   03/25/23 0748 (!) 123/59 97.2 °F (36.2 °C) Temporal 97 18 100 % --   03/25/23 0545 (!) 117/58 97.2 °F (36.2 °C) -- 92 18 99 % --   03/25/23 0118 -- -- -- -- -- 100 % --   03/24/23 2015 130/65 97.2 °F (36.2 °C) -- 88 18 99 % --   03/24/23 1645 127/63 97.6 °F (36.4 °C) Temporal 92 19 100 % --   03/24/23 1252 -- -- -- -- -- 94 % --   03/24/23 1133 (!) 118/57 98.3 °F (36.8 °C) Temporal 94 18 100 % --   03/24/23 1111 133/68 99 °F (37.2 °C) -- 99 -- -- 144 lb 10 oz (65.6 kg)         Intake/Output Summary (Last 24 hours) at 3/25/2023 1047  Last data filed at 3/25/2023 5075  Gross per 24 hour   Intake 2980 ml   Output 3300 ml   Net -320 ml     Due to the current efforts to prevent transmission of COVID-19 and also the need to preserve PPE for other caregivers, a face-to-face encounter with the patient was not performed. That being said, all relevant records and diagnostic tests were reviewed, including laboratory results and imaging. Please reference any relevant documentation elsewhere. Care will be coordinated with the primary service. Data:    Recent Labs     03/23/23 0519 03/24/23  0515 03/25/23  0437   WBC 7.4 7.2 9.2   HGB 8.5* 8.5* 8.9*   HCT 25.9* 26.3* 28.5*   MCV 97.0 97.0 99.7    236 239       Recent Labs     03/23/23 0519 03/24/23 0515 03/24/23  0719 03/25/23  0437    136  --  137   K 4.6 4.0  --  4.1   CL 91* 94*  --  94*   CO2 22 26  --  26   CREATININE 8.6* 4.8*  --  6.8*   BUN 40* 25*  --  34*   LABGLOM 6 11  --  7   GLUCOSE 281* 534* 471* 383*   CALCIUM 9.3 9.2  --  9.5   PHOS 7.3*  --  4.1 4.5   MG 2.4  --   --  2.2       Vit D, 25-Hydroxy   Date Value Ref Range Status   03/24/2023 64 30 - 100 ng/mL Final     Comment:     <20 ng/mL. ........... Woody Tara Deficient  20-30 ng/mL. ......... Woody Tara Insufficient   ng/mL. ........ Woody Tara Sufficient  >100 ng/mL. .......... Beeson Tara Toxic         PTH   Date Value Ref Range Status   01/29/2021 191 (H) 15 - 65 pg/mL Final       Recent Labs     03/22/23  1125 03/23/23  0519   ALT 17 15   AST 33* 20   ALKPHOS 111* 107*   BILITOT 1.2 0.6       Recent Labs     03/22/23  1125 03/23/23  0519   LABALBU 3.8

## 2023-03-25 NOTE — PROGRESS NOTES
Medical service company recalled to inquiry about the patient's oxygen for the 3rd time. Per Yoselyn(answering service) she contacted the  Scar Carranza) and he did not have an order for the oxygen. She then reached out to their supervisor Francesca and manager Zora Leal who bother did not answer their phone. She did leave a message for them to return the call.

## 2023-03-27 ENCOUNTER — CARE COORDINATION (OUTPATIENT)
Dept: CASE MANAGEMENT | Age: 38
End: 2023-03-27

## 2023-03-27 LAB
BACTERIA BLD CULT ORG #2: NORMAL
BACTERIA BLD CULT: NORMAL

## 2023-03-27 NOTE — CARE COORDINATION
[facility] Care Transitions Initial Follow Up Call    Call within 2 business days of discharge: Yes    Care Transition Nurse attempted initial CV-19 Outreach leaving Hipaa VM w/ appt reminder. Patient: Leidy Leon Patient : 1985   MRN: <[id number]>  Reason for Admission: 3/22/2023 - 3/25/2023 [address] Acute Hypoxemic Respiratory Failure 2/2 volume overload due to noncompliance w/ HD, COVID 19 PNA (pos 3/22/23). Discharge Date: 3/25/23 RARS: Readmission Risk Score: 21  NR  CV-19    [address] [facility] [facility] T/Th/Sat    PCP 3/31 11:15  Endo/Abus[place]  2:00    START taking:  benzonatate (TESSALON)  doxycycline hyclate (VIBRA-TABS)  guaiFENesin (MUCINEX)  lactobacillus  levoFLOXacin (LEVAQUIN)  CHANGE how you take:  HumaLOG (insulin lispro)  insulin lispro (1 Unit Dial) (HumaLOG KwikPen)    PMH: Smoker, CHF, ESRD on HD, Dialysis noncompliance, HTN, DVT, DM, Home O2 use, Covid. Jose Gonzalez primary decision maker [phone]     Last Discharge  Street       Date Complaint Diagnosis Description Type Department Provider    3/22/23 Shortness of Breath Acute respiratory failure with hypoxia ([facility] [address] . .. ED to Hosp-Admission (Discharged) (ADMITTED) Lenny Rey MD; Sharad Fajardo . ..             Mart Martinez RN

## 2023-03-28 ENCOUNTER — CARE COORDINATION (OUTPATIENT)
Dept: CASE MANAGEMENT | Age: 38
End: 2023-03-28

## 2023-03-28 NOTE — CARE COORDINATION
[facility] Care Transitions Initial Follow Up Call    Call within 2 business days of discharge: Yes    Care Transition Nurse attempted second initial CT outreach leaving Hipaa VM w/ my contact info and appt reminder. CTN s/o. Patient: Thong Espinosa Patient : 1985   MRN: <[id number]>  Reason for Admission: 3/22/2023 - 3/25/2023 [address] Acute Hypoxemic Respiratory Failure 2/2 volume overload due to noncompliance w/ HD, COVID 19 PNA (pos 3/22/23). Discharge Date: 3/25/23 RARS: Readmission Risk Score: 21  NR  CV-19     [facility] HD T//Sat     PCP 3/31 11:15  Endo/Abusag  2:00     START taking:  benzonatate (TESSALON)  doxycycline hyclate (VIBRA-TABS)  guaiFENesin (MUCINEX)  lactobacillus  levoFLOXacin (LEVAQUIN)  CHANGE how you take:  HumaLOG (insulin lispro)  insulin lispro (1 Unit Dial) (HumaLOG KwikPen)     PMH: Smoker, CHF, ESRD on HD, Dialysis noncompliance, HTN, DVT, DM, Home O2 use, Covid. Rosanne Santiago primary decision maker [phone]        Last Discharge  Street       Date Complaint Diagnosis Description Type Department Provider    3/22/23 Shortness of Breath Acute respiratory failure with hypoxia ([facility] [address] . .. ED to Hosp-Admission (Discharged) (ADMITTED) Tameka Jean MD; Geraldine Fallon . ..             Jamie Goldmann, RN

## 2023-03-31 ENCOUNTER — OFFICE VISIT (OUTPATIENT)
Dept: FAMILY MEDICINE CLINIC | Age: 38
End: 2023-03-31

## 2023-03-31 VITALS
WEIGHT: 138 LBS | HEART RATE: 98 BPM | OXYGEN SATURATION: 100 % | BODY MASS INDEX: 27.82 KG/M2 | TEMPERATURE: 97 F | DIASTOLIC BLOOD PRESSURE: 38 MMHG | RESPIRATION RATE: 24 BRPM | SYSTOLIC BLOOD PRESSURE: 88 MMHG | HEIGHT: 59 IN

## 2023-03-31 DIAGNOSIS — Z99.2 ESRD (END STAGE RENAL DISEASE) ON DIALYSIS (HCC): Chronic | ICD-10-CM

## 2023-03-31 DIAGNOSIS — Z09 HOSPITAL DISCHARGE FOLLOW-UP: Primary | ICD-10-CM

## 2023-03-31 DIAGNOSIS — E10.22 TYPE 1 DIABETES MELLITUS WITH CHRONIC KIDNEY DISEASE ON CHRONIC DIALYSIS (HCC): ICD-10-CM

## 2023-03-31 DIAGNOSIS — U07.1 COVID-19: ICD-10-CM

## 2023-03-31 DIAGNOSIS — N18.6 TYPE 1 DIABETES MELLITUS WITH CHRONIC KIDNEY DISEASE ON CHRONIC DIALYSIS (HCC): ICD-10-CM

## 2023-03-31 DIAGNOSIS — Z99.2 TYPE 1 DIABETES MELLITUS WITH CHRONIC KIDNEY DISEASE ON CHRONIC DIALYSIS (HCC): ICD-10-CM

## 2023-03-31 DIAGNOSIS — I50.22 CHRONIC SYSTOLIC (CONGESTIVE) HEART FAILURE (HCC): ICD-10-CM

## 2023-03-31 DIAGNOSIS — I95.9 HYPOTENSION, UNSPECIFIED HYPOTENSION TYPE: ICD-10-CM

## 2023-03-31 DIAGNOSIS — N18.6 ESRD (END STAGE RENAL DISEASE) ON DIALYSIS (HCC): Chronic | ICD-10-CM

## 2023-03-31 PROBLEM — J96.00 ACUTE RESPIRATORY FAILURE (HCC): Status: RESOLVED | Noted: 2023-02-10 | Resolved: 2023-03-31

## 2023-03-31 PROBLEM — Z91.199 NON-COMPLIANCE: Status: RESOLVED | Noted: 2021-01-27 | Resolved: 2023-03-31

## 2023-03-31 PROBLEM — R65.10 SIRS (SYSTEMIC INFLAMMATORY RESPONSE SYNDROME) (HCC): Status: RESOLVED | Noted: 2023-03-22 | Resolved: 2023-03-31

## 2023-03-31 PROBLEM — E10.65 UNCONTROLLED TYPE 1 DIABETES MELLITUS WITH HYPERGLYCEMIA (HCC): Status: RESOLVED | Noted: 2018-05-07 | Resolved: 2023-03-31

## 2023-03-31 PROBLEM — J96.01 ACUTE HYPOXEMIC RESPIRATORY FAILURE (HCC): Status: RESOLVED | Noted: 2023-03-22 | Resolved: 2023-03-31

## 2023-03-31 PROBLEM — I50.9 ACUTE CONGESTIVE HEART FAILURE (HCC): Status: RESOLVED | Noted: 2020-07-21 | Resolved: 2023-03-31

## 2023-03-31 PROBLEM — Z99.81 O2 DEPENDENT: Status: RESOLVED | Noted: 2023-03-24 | Resolved: 2023-03-31

## 2023-03-31 RX ORDER — HYDRALAZINE HYDROCHLORIDE 50 MG/1
50 TABLET, FILM COATED ORAL EVERY 8 HOURS PRN
Qty: 90 TABLET | Refills: 3 | Status: SHIPPED
Start: 2023-03-31

## 2023-03-31 SDOH — ECONOMIC STABILITY: FOOD INSECURITY: WITHIN THE PAST 12 MONTHS, THE FOOD YOU BOUGHT JUST DIDN'T LAST AND YOU DIDN'T HAVE MONEY TO GET MORE.: SOMETIMES TRUE

## 2023-03-31 SDOH — ECONOMIC STABILITY: HOUSING INSECURITY
IN THE LAST 12 MONTHS, WAS THERE A TIME WHEN YOU DID NOT HAVE A STEADY PLACE TO SLEEP OR SLEPT IN A SHELTER (INCLUDING NOW)?: NO

## 2023-03-31 SDOH — ECONOMIC STABILITY: TRANSPORTATION INSECURITY
IN THE PAST 12 MONTHS, HAS LACK OF TRANSPORTATION KEPT YOU FROM MEETINGS, WORK, OR FROM GETTING THINGS NEEDED FOR DAILY LIVING?: NO

## 2023-03-31 SDOH — ECONOMIC STABILITY: INCOME INSECURITY: HOW HARD IS IT FOR YOU TO PAY FOR THE VERY BASICS LIKE FOOD, HOUSING, MEDICAL CARE, AND HEATING?: SOMEWHAT HARD

## 2023-03-31 SDOH — ECONOMIC STABILITY: FOOD INSECURITY: WITHIN THE PAST 12 MONTHS, YOU WORRIED THAT YOUR FOOD WOULD RUN OUT BEFORE YOU GOT MONEY TO BUY MORE.: SOMETIMES TRUE

## 2023-03-31 ASSESSMENT — PATIENT HEALTH QUESTIONNAIRE - PHQ9
SUM OF ALL RESPONSES TO PHQ QUESTIONS 1-9: 0
SUM OF ALL RESPONSES TO PHQ QUESTIONS 1-9: 0
2. FEELING DOWN, DEPRESSED OR HOPELESS: 0
1. LITTLE INTEREST OR PLEASURE IN DOING THINGS: 0
SUM OF ALL RESPONSES TO PHQ QUESTIONS 1-9: 0
SUM OF ALL RESPONSES TO PHQ9 QUESTIONS 1 & 2: 0
SUM OF ALL RESPONSES TO PHQ QUESTIONS 1-9: 0

## 2023-03-31 NOTE — PROGRESS NOTES
Chest pressure    MRSA colonization    O2 dependent       Medications listed as ordered at the time of discharge from hospital     Medication List            Accurate as of March 31, 2023 11:32 AM. If you have any questions, ask your nurse or doctor.                 CHANGE how you take these medications      hydrALAZINE 50 MG tablet  Commonly known as: APRESOLINE  Take 1 tablet by mouth every 8 hours as needed Take only if systolic BP is >524  What changed:   when to take this  reasons to take this  additional instructions  Changed by: Fatemeh Layton, DO            CONTINUE taking these medications      Acetaminophen Extra Strength 500 MG tablet  Generic drug: acetaminophen  TAKE 1 TABLET BY MOUTH EVERY 6 HOURS AS NEEDED FOR PAIN     albuterol sulfate  (90 Base) MCG/ACT inhaler  Commonly known as: PROVENTIL;VENTOLIN;PROAIR  Inhale 2 puffs into the lungs every 4-6 hours as needed for Wheezing or Shortness of Breath     benzonatate 100 MG capsule  Commonly known as: TESSALON  Take 1 capsule by mouth 3 times daily for 7 days     calcium acetate 667 MG Caps capsule  Commonly known as: PHOSLO     * carvedilol 25 MG tablet  Commonly known as: COREG     * carvedilol 25 MG tablet  Commonly known as: COREG     doxycycline hyclate 100 MG tablet  Commonly known as: VIBRA-TABS  Take 1 tablet by mouth every 12 hours for 7 days     famotidine 20 MG tablet  Commonly known as: PEPCID  TAKE 1 TABLET BY MOUTH EVERY MORNING     guaiFENesin 600 MG extended release tablet  Commonly known as: MUCINEX  Take 2 tablets by mouth 2 times daily for 10 days     insulin lispro (1 Unit Dial) 100 UNIT/ML Sopn  Commonly known as: HumaLOG KwikPen  Inject 1 Units into the skin 3 times daily (before meals) Glucose: Dose:    No Insulin  200-249 2 Units  250-299 4 Units  300-349 6 Units  Over 349 8 Units and recheck in 1 hour, do not eat or take snacks in the interim     lactobacillus Caps capsule  Take 1 capsule by mouth in the

## 2023-04-05 ENCOUNTER — TELEPHONE (OUTPATIENT)
Dept: FAMILY MEDICINE CLINIC | Age: 38
End: 2023-04-05

## 2023-04-05 DIAGNOSIS — E16.2 HYPOGLYCEMIA: ICD-10-CM

## 2023-04-05 DIAGNOSIS — J96.01 ACUTE RESPIRATORY FAILURE WITH HYPOXIA (HCC): Primary | ICD-10-CM

## 2023-04-05 NOTE — TELEPHONE ENCOUNTER
Can she get a script for a small portable oxygen. It is difficult to travel with big tank, she has a daughter in a wheelchair that she has to push.

## 2023-04-06 NOTE — TELEPHONE ENCOUNTER
I spoke with patient. She said she uses her oxygen every day all day while home. The tank is too big to take out with her. She stated that her oxygen level drops when she is not using it.

## 2023-04-20 ENCOUNTER — PATIENT MESSAGE (OUTPATIENT)
Dept: FAMILY MEDICINE CLINIC | Age: 38
End: 2023-04-20

## 2023-04-21 PROBLEM — R77.8 ELEVATED TROPONIN: Status: RESOLVED | Noted: 2021-01-27 | Resolved: 2023-04-21

## 2023-04-21 PROBLEM — R79.89 ELEVATED TROPONIN: Status: RESOLVED | Noted: 2021-01-27 | Resolved: 2023-04-21

## 2023-04-23 DIAGNOSIS — K21.9 GASTROESOPHAGEAL REFLUX DISEASE WITHOUT ESOPHAGITIS: ICD-10-CM

## 2023-04-24 RX ORDER — PSEUDOEPHED/ACETAMINOPH/DIPHEN 30MG-500MG
TABLET ORAL
Qty: 120 TABLET | Refills: 0 | Status: SHIPPED
Start: 2023-04-24 | End: 2023-05-23

## 2023-04-24 RX ORDER — FAMOTIDINE 20 MG/1
20 TABLET, FILM COATED ORAL EVERY MORNING
Qty: 30 TABLET | Refills: 3 | Status: SHIPPED | OUTPATIENT
Start: 2023-04-24

## 2023-04-24 RX ORDER — FAMOTIDINE 20 MG/1
20 TABLET, FILM COATED ORAL EVERY MORNING
Qty: 30 TABLET | Refills: 3 | OUTPATIENT
Start: 2023-04-24

## 2023-04-24 NOTE — TELEPHONE ENCOUNTER
Last Appointment:  3/31/2023  Future Appointments   Date Time Provider Titus Potter   5/1/2023  9:15 AM Felicitas Cordoba MD [facility]   5/24/2023  2:00 PM Jose Foreman MD [facility][1]   6/30/2023 11:00 AM Tahira Dawkins  [address]

## 2023-05-01 ENCOUNTER — OFFICE VISIT (OUTPATIENT)
Dept: VASCULAR SURGERY | Age: 38
End: 2023-05-01
Payer: MEDICARE

## 2023-05-01 VITALS — HEIGHT: 59 IN | BODY MASS INDEX: 30.24 KG/M2 | WEIGHT: 150 LBS

## 2023-05-01 DIAGNOSIS — N18.6 ENCOUNTER REGARDING VASCULAR ACCESS FOR DIALYSIS FOR ESRD (HCC): Primary | ICD-10-CM

## 2023-05-01 DIAGNOSIS — Z99.2 ENCOUNTER REGARDING VASCULAR ACCESS FOR DIALYSIS FOR ESRD (HCC): Primary | ICD-10-CM

## 2023-05-01 PROCEDURE — G8427 DOCREV CUR MEDS BY ELIG CLIN: HCPCS | Performed by: SURGERY

## 2023-05-01 PROCEDURE — 4004F PT TOBACCO SCREEN RCVD TLK: CPT | Performed by: SURGERY

## 2023-05-01 PROCEDURE — 99213 OFFICE O/P EST LOW 20 MIN: CPT | Performed by: SURGERY

## 2023-05-01 PROCEDURE — G8417 CALC BMI ABV UP PARAM F/U: HCPCS | Performed by: SURGERY

## 2023-05-01 NOTE — PROGRESS NOTES
Lack of Transportation (Non-Medical):  No   Physical Activity: Not on file   Stress: Not on file   Social Connections: Not on file   Intimate Partner Violence: Not on file   Housing Stability: Unknown    Unable to Pay for Housing in the Last Year: Not on file    Number of Places Lived in the Last Year: Not on file    Unstable Housing in the Last Year: No     Family History   Problem Relation Age of Onset    Stroke Mother     Cancer Father     Diabetes Paternal Aunt      Labs  Lab Results   Component Value Date    WBC 9.2 03/25/2023    HGB 8.9 (L) 03/25/2023    HCT 28.5 (L) 03/25/2023     03/25/2023    PROTIME 13.6 (H) 02/11/2023    INR 1.2 02/11/2023    APTT 32.7 02/11/2023    K 4.1 03/25/2023    BUN 34 (H) 03/25/2023    CREATININE 6.8 (H) 03/25/2023       PHYSICAL EXAM:    Ht 4' 11\" (1.499 m)   Wt 150 lb (68 kg)   BMI 30.30 kg/m²   CONSTITUTIONAL:   Awake, alert, cooperative  PSYCHIATRIC :  Oriented to time, place and person     Appropriate insight to disease process  EYES: Lids and lashes normal  ENT:  External ears and nose without lesions   Hearing deficits not noted  NECK: Supple, symmetrical, trachea midline  LUNGS:  No increased work of breathing                 Clear to auscultation  CARDIOVASCULAR:  regular rate and rhythm   ABDOMEN:  soft, non-distended, non-tender   Aorta is not palpable  SKIN:   Normal skin color   Texture and turgor abnormal, no induration  EXTREMITIES:   L UE Edema, swelling   + thrill but pulsatile in upper arm   Radial 1+, Ulnar biphasic, Palmar Biphasic  R LE Edema slight  L LE Edema prsent    RADIOLOGY:    A/P L UE swelling, edema, poorly functioning AVF  Dialysis Access  Plan for L UE fistulagram, intervention  Pt explained risks, benefits, complications, procedure, alternatives, options, and expected outcomes and was agreeable to proceed    Gabi Gamez MD

## 2023-05-02 DIAGNOSIS — L30.9 DERMATITIS: ICD-10-CM

## 2023-05-02 DIAGNOSIS — L73.2 HIDRADENITIS SUPPURATIVA: ICD-10-CM

## 2023-05-03 RX ORDER — CLOTRIMAZOLE AND BETAMETHASONE DIPROPIONATE 10; .64 MG/G; MG/G
CREAM TOPICAL
Qty: 45 G | Refills: 5 | Status: SHIPPED | OUTPATIENT
Start: 2023-05-03

## 2023-05-03 NOTE — TELEPHONE ENCOUNTER
Last Appointment:  3/31/2023  Future Appointments   Date Time Provider Titus Potter   5/19/2023  9:30 AM [facility] PERIPHERAL LAB [facility]   5/24/2023  2:00 PM Susan Tucker MD [facility]   6/30/2023 11:00 AM Robyn Bryan  [address]

## 2023-05-04 ENCOUNTER — HOSPITAL ENCOUNTER (INPATIENT)
Age: 38
LOS: 1 days | Discharge: HOME | DRG: 314 | End: 2023-05-05
Attending: EMERGENCY MEDICINE | Admitting: INTERNAL MEDICINE
Payer: COMMERCIAL

## 2023-05-04 DIAGNOSIS — Z99.2 ESRD ON HEMODIALYSIS (HCC): ICD-10-CM

## 2023-05-04 DIAGNOSIS — R60.0 EDEMA OF LEFT UPPER EXTREMITY: ICD-10-CM

## 2023-05-04 DIAGNOSIS — N18.6 ESRD ON HEMODIALYSIS (HCC): ICD-10-CM

## 2023-05-04 DIAGNOSIS — E16.2 HYPOGLYCEMIA: Primary | ICD-10-CM

## 2023-05-04 LAB
ANION GAP SERPL CALCULATED.3IONS-SCNC: 13 MMOL/L (ref 7–16)
ANION GAP SERPL CALCULATED.3IONS-SCNC: 14 MMOL/L (ref 7–16)
BASOPHILS # BLD: 0.02 E9/L (ref 0–0.2)
BASOPHILS NFR BLD: 0.4 % (ref 0–2)
BUN SERPL-MCNC: 28 MG/DL (ref 6–20)
BUN SERPL-MCNC: 71 MG/DL (ref 6–20)
CALCIUM SERPL-MCNC: 8.6 MG/DL (ref 8.6–10.2)
CALCIUM SERPL-MCNC: 9.5 MG/DL (ref 8.6–10.2)
CHLORIDE SERPL-SCNC: 96 MMOL/L (ref 98–107)
CHLORIDE SERPL-SCNC: 99 MMOL/L (ref 98–107)
CHP ED QC CHECK: NORMAL
CO2 SERPL-SCNC: 26 MMOL/L (ref 22–29)
CO2 SERPL-SCNC: 28 MMOL/L (ref 22–29)
CREAT SERPL-MCNC: 12 MG/DL (ref 0.5–1)
CREAT SERPL-MCNC: 6.2 MG/DL (ref 0.5–1)
EOSINOPHIL # BLD: 0.17 E9/L (ref 0.05–0.5)
EOSINOPHIL NFR BLD: 3.1 % (ref 0–6)
ERYTHROCYTE [DISTWIDTH] IN BLOOD BY AUTOMATED COUNT: 18.4 FL (ref 11.5–15)
GLUCOSE BLD-MCNC: 59 MG/DL
GLUCOSE SERPL-MCNC: 32 MG/DL (ref 74–99)
GLUCOSE SERPL-MCNC: 443 MG/DL (ref 74–99)
HBA1C MFR BLD: 7.8 % (ref 4–5.6)
HCT VFR BLD AUTO: 29.1 % (ref 34–48)
HGB BLD-MCNC: 9.5 G/DL (ref 11.5–15.5)
IMM GRANULOCYTES # BLD: 0.02 E9/L
IMM GRANULOCYTES NFR BLD: 0.4 % (ref 0–5)
LYMPHOCYTES # BLD: 1.21 E9/L (ref 1.5–4)
LYMPHOCYTES NFR BLD: 22.1 % (ref 20–42)
MCH RBC QN AUTO: 32.1 PG (ref 26–35)
MCHC RBC AUTO-ENTMCNC: 32.6 % (ref 32–34.5)
MCV RBC AUTO: 98.3 FL (ref 80–99.9)
METER GLUCOSE: 44 MG/DL (ref 74–99)
METER GLUCOSE: 497 MG/DL (ref 74–99)
METER GLUCOSE: 59 MG/DL (ref 74–99)
METER GLUCOSE: 87 MG/DL (ref 74–99)
MONOCYTES # BLD: 0.42 E9/L (ref 0.1–0.95)
MONOCYTES NFR BLD: 7.7 % (ref 2–12)
NEUTROPHILS # BLD: 3.63 E9/L (ref 1.8–7.3)
NEUTS SEG NFR BLD: 66.3 % (ref 43–80)
PLATELET # BLD AUTO: 139 E9/L (ref 130–450)
PMV BLD AUTO: 9.2 FL (ref 7–12)
POTASSIUM SERPL-SCNC: 3.7 MMOL/L (ref 3.5–5)
POTASSIUM SERPL-SCNC: 4.8 MMOL/L (ref 3.5–5)
RBC # BLD AUTO: 2.96 E12/L (ref 3.5–5.5)
SODIUM SERPL-SCNC: 135 MMOL/L (ref 132–146)
SODIUM SERPL-SCNC: 141 MMOL/L (ref 132–146)
WBC # BLD: 5.5 E9/L (ref 4.5–11.5)

## 2023-05-04 PROCEDURE — 2709999900 HC NON-CHARGEABLE SUPPLY

## 2023-05-04 PROCEDURE — 99285 EMERGENCY DEPT VISIT HI MDM: CPT

## 2023-05-04 PROCEDURE — 93005 ELECTROCARDIOGRAM TRACING: CPT | Performed by: EMERGENCY MEDICINE

## 2023-05-04 PROCEDURE — S5553 INSULIN LONG ACTING 5 U: HCPCS | Performed by: INTERNAL MEDICINE

## 2023-05-04 PROCEDURE — 36415 COLL VENOUS BLD VENIPUNCTURE: CPT

## 2023-05-04 PROCEDURE — 96374 THER/PROPH/DIAG INJ IV PUSH: CPT

## 2023-05-04 PROCEDURE — 2500000003 HC RX 250 WO HCPCS: Performed by: EMERGENCY MEDICINE

## 2023-05-04 PROCEDURE — 6360000002 HC RX W HCPCS

## 2023-05-04 PROCEDURE — 90935 HEMODIALYSIS ONE EVALUATION: CPT

## 2023-05-04 PROCEDURE — 80048 BASIC METABOLIC PNL TOTAL CA: CPT

## 2023-05-04 PROCEDURE — 6370000000 HC RX 637 (ALT 250 FOR IP)

## 2023-05-04 PROCEDURE — 82962 GLUCOSE BLOOD TEST: CPT

## 2023-05-04 PROCEDURE — 2500000003 HC RX 250 WO HCPCS

## 2023-05-04 PROCEDURE — 1200000000 HC SEMI PRIVATE

## 2023-05-04 PROCEDURE — 5A1D70Z PERFORMANCE OF URINARY FILTRATION, INTERMITTENT, LESS THAN 6 HOURS PER DAY: ICD-10-PCS | Performed by: STUDENT IN AN ORGANIZED HEALTH CARE EDUCATION/TRAINING PROGRAM

## 2023-05-04 PROCEDURE — C1725 CATH, TRANSLUMIN NON-LASER: HCPCS

## 2023-05-04 PROCEDURE — 36902 INTRO CATH DIALYSIS CIRCUIT: CPT

## 2023-05-04 PROCEDURE — G0378 HOSPITAL OBSERVATION PER HR: HCPCS

## 2023-05-04 PROCEDURE — C1894 INTRO/SHEATH, NON-LASER: HCPCS

## 2023-05-04 PROCEDURE — 85025 COMPLETE CBC W/AUTO DIFF WBC: CPT

## 2023-05-04 PROCEDURE — B51W1ZZ FLUOROSCOPY OF DIALYSIS SHUNT/FISTULA USING LOW OSMOLAR CONTRAST: ICD-10-PCS | Performed by: STUDENT IN AN ORGANIZED HEALTH CARE EDUCATION/TRAINING PROGRAM

## 2023-05-04 PROCEDURE — C2623 CATH, TRANSLUMIN, DRUG-COAT: HCPCS

## 2023-05-04 PROCEDURE — C1769 GUIDE WIRE: HCPCS

## 2023-05-04 PROCEDURE — 83036 HEMOGLOBIN GLYCOSYLATED A1C: CPT

## 2023-05-04 PROCEDURE — 6360000002 HC RX W HCPCS: Performed by: INTERNAL MEDICINE

## 2023-05-04 PROCEDURE — 6360000002 HC RX W HCPCS: Performed by: SURGERY

## 2023-05-04 PROCEDURE — 6370000000 HC RX 637 (ALT 250 FOR IP): Performed by: SURGERY

## 2023-05-04 PROCEDURE — 6370000000 HC RX 637 (ALT 250 FOR IP): Performed by: INTERNAL MEDICINE

## 2023-05-04 RX ORDER — ONDANSETRON 4 MG/1
4 TABLET, ORALLY DISINTEGRATING ORAL EVERY 8 HOURS PRN
Status: DISCONTINUED | OUTPATIENT
Start: 2023-05-04 | End: 2023-05-05 | Stop reason: HOSPADM

## 2023-05-04 RX ORDER — DEXTROSE MONOHYDRATE 25 G/50ML
25 INJECTION, SOLUTION INTRAVENOUS PRN
Status: DISCONTINUED | OUTPATIENT
Start: 2023-05-04 | End: 2023-05-04

## 2023-05-04 RX ORDER — CARVEDILOL 25 MG/1
50 TABLET ORAL EVERY MORNING
Status: DISCONTINUED | OUTPATIENT
Start: 2023-05-05 | End: 2023-05-05 | Stop reason: HOSPADM

## 2023-05-04 RX ORDER — ACETAMINOPHEN 325 MG/1
650 TABLET ORAL EVERY 6 HOURS PRN
Status: DISCONTINUED | OUTPATIENT
Start: 2023-05-04 | End: 2023-05-05 | Stop reason: HOSPADM

## 2023-05-04 RX ORDER — CARVEDILOL 25 MG/1
25 TABLET ORAL NIGHTLY
Status: DISCONTINUED | OUTPATIENT
Start: 2023-05-04 | End: 2023-05-05 | Stop reason: HOSPADM

## 2023-05-04 RX ORDER — INSULIN LISPRO 100 [IU]/ML
0-4 INJECTION, SOLUTION INTRAVENOUS; SUBCUTANEOUS
Status: DISCONTINUED | OUTPATIENT
Start: 2023-05-04 | End: 2023-05-05 | Stop reason: HOSPADM

## 2023-05-04 RX ORDER — DEXTROSE MONOHYDRATE 25 G/50ML
INJECTION, SOLUTION INTRAVENOUS
Status: COMPLETED
Start: 2023-05-04 | End: 2023-05-04

## 2023-05-04 RX ORDER — DEXTROSE MONOHYDRATE 100 MG/ML
INJECTION, SOLUTION INTRAVENOUS CONTINUOUS PRN
Status: DISCONTINUED | OUTPATIENT
Start: 2023-05-04 | End: 2023-05-05 | Stop reason: HOSPADM

## 2023-05-04 RX ORDER — ONDANSETRON 2 MG/ML
4 INJECTION INTRAMUSCULAR; INTRAVENOUS ONCE
Status: COMPLETED | OUTPATIENT
Start: 2023-05-04 | End: 2023-05-04

## 2023-05-04 RX ORDER — SODIUM CHLORIDE 9 MG/ML
INJECTION, SOLUTION INTRAVENOUS PRN
Status: DISCONTINUED | OUTPATIENT
Start: 2023-05-04 | End: 2023-05-05 | Stop reason: HOSPADM

## 2023-05-04 RX ORDER — INSULIN LISPRO 100 [IU]/ML
0-4 INJECTION, SOLUTION INTRAVENOUS; SUBCUTANEOUS NIGHTLY
Status: DISCONTINUED | OUTPATIENT
Start: 2023-05-04 | End: 2023-05-05 | Stop reason: HOSPADM

## 2023-05-04 RX ORDER — DIPHENHYDRAMINE HYDROCHLORIDE 50 MG/ML
25 INJECTION INTRAMUSCULAR; INTRAVENOUS ONCE
Status: COMPLETED | OUTPATIENT
Start: 2023-05-04 | End: 2023-05-04

## 2023-05-04 RX ORDER — SENNA PLUS 8.6 MG/1
1 TABLET ORAL DAILY PRN
Status: DISCONTINUED | OUTPATIENT
Start: 2023-05-04 | End: 2023-05-05 | Stop reason: HOSPADM

## 2023-05-04 RX ORDER — ACETAMINOPHEN 650 MG/1
650 SUPPOSITORY RECTAL EVERY 6 HOURS PRN
Status: DISCONTINUED | OUTPATIENT
Start: 2023-05-04 | End: 2023-05-05 | Stop reason: HOSPADM

## 2023-05-04 RX ORDER — DEXTROSE MONOHYDRATE 25 G/50ML
INJECTION, SOLUTION INTRAVENOUS
Status: DISPENSED
Start: 2023-05-04 | End: 2023-05-05

## 2023-05-04 RX ORDER — ONDANSETRON 2 MG/ML
4 INJECTION INTRAMUSCULAR; INTRAVENOUS EVERY 6 HOURS PRN
Status: DISCONTINUED | OUTPATIENT
Start: 2023-05-04 | End: 2023-05-05 | Stop reason: HOSPADM

## 2023-05-04 RX ORDER — BISACODYL 10 MG
10 SUPPOSITORY, RECTAL RECTAL DAILY PRN
Status: DISCONTINUED | OUTPATIENT
Start: 2023-05-04 | End: 2023-05-05 | Stop reason: HOSPADM

## 2023-05-04 RX ORDER — CLOPIDOGREL BISULFATE 75 MG/1
75 TABLET ORAL ONCE
Status: COMPLETED | OUTPATIENT
Start: 2023-05-04 | End: 2023-05-04

## 2023-05-04 RX ORDER — DEXTROSE MONOHYDRATE 25 G/50ML
25 INJECTION, SOLUTION INTRAVENOUS ONCE
Status: COMPLETED | OUTPATIENT
Start: 2023-05-04 | End: 2023-05-04

## 2023-05-04 RX ORDER — FAMOTIDINE 20 MG/1
20 TABLET, FILM COATED ORAL EVERY MORNING
Status: DISCONTINUED | OUTPATIENT
Start: 2023-05-05 | End: 2023-05-05 | Stop reason: HOSPADM

## 2023-05-04 RX ORDER — ALBUTEROL SULFATE 2.5 MG/3ML
2.5 SOLUTION RESPIRATORY (INHALATION) EVERY 4 HOURS PRN
Status: DISCONTINUED | OUTPATIENT
Start: 2023-05-04 | End: 2023-05-05 | Stop reason: HOSPADM

## 2023-05-04 RX ORDER — SODIUM CHLORIDE 0.9 % (FLUSH) 0.9 %
10 SYRINGE (ML) INJECTION PRN
Status: DISCONTINUED | OUTPATIENT
Start: 2023-05-04 | End: 2023-05-05 | Stop reason: HOSPADM

## 2023-05-04 RX ORDER — SODIUM CHLORIDE 0.9 % (FLUSH) 0.9 %
10 SYRINGE (ML) INJECTION EVERY 12 HOURS SCHEDULED
Status: DISCONTINUED | OUTPATIENT
Start: 2023-05-04 | End: 2023-05-05 | Stop reason: HOSPADM

## 2023-05-04 RX ORDER — INSULIN GLARGINE-YFGN 100 [IU]/ML
5 INJECTION, SOLUTION SUBCUTANEOUS NIGHTLY
Status: DISCONTINUED | OUTPATIENT
Start: 2023-05-04 | End: 2023-05-05 | Stop reason: HOSPADM

## 2023-05-04 RX ADMIN — DEXTROSE MONOHYDRATE 25 G: 25 INJECTION, SOLUTION INTRAVENOUS at 14:02

## 2023-05-04 RX ADMIN — DEXTROSE MONOHYDRATE 25 G: 25 INJECTION, SOLUTION INTRAVENOUS at 12:39

## 2023-05-04 RX ADMIN — CARVEDILOL 25 MG: 25 TABLET, FILM COATED ORAL at 21:27

## 2023-05-04 RX ADMIN — INSULIN LISPRO 4 UNITS: 100 INJECTION, SOLUTION INTRAVENOUS; SUBCUTANEOUS at 21:51

## 2023-05-04 RX ADMIN — DIPHENHYDRAMINE HYDROCHLORIDE 25 MG: 50 INJECTION, SOLUTION INTRAMUSCULAR; INTRAVENOUS at 15:38

## 2023-05-04 RX ADMIN — CLOPIDOGREL 75 MG: 75 TABLET, FILM COATED ORAL at 15:08

## 2023-05-04 RX ADMIN — ONDANSETRON HYDROCHLORIDE 4 MG: 2 INJECTION, SOLUTION INTRAMUSCULAR; INTRAVENOUS at 15:09

## 2023-05-04 RX ADMIN — INSULIN GLARGINE-YFGN 5 UNITS: 100 INJECTION, SOLUTION SUBCUTANEOUS at 21:26

## 2023-05-04 NOTE — CONSULTS
interim 1 Adjustable Dose Pre-filled Pen Syringe 0    lactobacillus (CULTURELLE) CAPS capsule Take 1 capsule by mouth in the morning and at bedtime 60 capsule 0    carvedilol (COREG) 25 MG tablet Take 1 tablet by mouth at bedtime      calcium acetate (PHOSLO) 667 MG CAPS capsule Take 4 capsules by mouth 3 times daily (with meals)      B Complex-C-Folic Acid (RENAL-JACOB) 0.8 MG TABS Take 1 tablet by mouth daily      mupirocin (BACTROBAN) 2 % cream Apply topically 3 times daily. 30 g 2    albuterol sulfate  (90 Base) MCG/ACT inhaler Inhale 2 puffs into the lungs every 4-6 hours as needed for Wheezing or Shortness of Breath 1 Inhaler 2    Insulin Glargine, 2 Unit Dial, (TOUJEO MAX SOLOSTAR) 300 UNIT/ML SOPN Inject 20 Units into the skin nightly      carvedilol (COREG) 25 MG tablet Take 2 tablets by mouth every morning  5       Allergies:    Patient has no known allergies. Social History:     reports that she has been smoking cigarettes. She has never used smokeless tobacco. She reports that she does not currently use alcohol. She reports that she does not use drugs. Family History:         Problem Relation Age of Onset    Stroke Mother     Cancer Father     Diabetes Paternal Aunt        Physical Exam:      Patient Vitals for the past 24 hrs:   BP Temp Pulse Resp SpO2   05/04/23 1601 (!) 168/84 -- 87 -- --   05/04/23 1526 (!) 146/81 -- 87 -- --   05/04/23 1520 (!) 146/92 -- 88 18 98 %   05/04/23 1245 131/65 -- 84 18 97 %   05/04/23 1211 (!) 145/72 98.1 °F (36.7 °C) 84 18 95 %       No intake or output data in the 24 hours ending 05/04/23 1608    General: Awake, alert, no acute distress  Neck: No JVD noted  Lungs: Clear bilaterally upper, diminished to the bases bilaterally. Unlabored  CV: Regular rate and rhythm. No rub  Abd: Soft, nontender, nondistended. Active bowel sounds  Skin: Warm and dry.   No rash on exposed extremities  Ext: 2+ LUE edema    Neuro: Awake, answers questions
color, texture, turgor    EXTREMITIES:    R UE Swelling absent Incisions present       5/5 Strength    L UE Swelling present Incisions present, LUE AVF aneurysmal with palpable thrill distally and pulsatile proximally      4/5 Strength    R brachial 2 L brachial 2   R radial 2 L radial 2   R femoral  L femoral    R popliteal  L popliteal    R posterior tibial  L posterior tibial    R dorsalis pedis  L dorsalis pedis        LABS:    Lab Results   Component Value Date    WBC 5.5 05/04/2023    HGB 9.5 (L) 05/04/2023    HCT 29.1 (L) 05/04/2023     05/04/2023    PROTIME 13.6 (H) 02/11/2023    INR 1.2 02/11/2023    K 4.1 03/25/2023    BUN 34 (H) 03/25/2023    CREATININE 6.8 (H) 03/25/2023       RADIOLOGY:  No results found. Assesment/Plan  44 y.o. female with LUE swelling from AVF to hand    - Keep pt NPO, sips with meds  - IVFs  - Plan for LUE fistulogram today  - Plan for dialysis after fistulogram  - Okay to DC after fistulogram  - Discussed with Dr. Aleena Cash MD  5/4/23  1:28 PM EDT    Pt seen and examined  L UE fistulagram  I reviewed the procedure with the patient and family as available. I discussed the procedure, risks, benefits, complications, and alternatives of the procedure. They understand and consent.   All questions were answered    Lore Cruz MD

## 2023-05-04 NOTE — OP NOTE
Cardiovascular Lab Procedure Report     Mayo Webb  1985    Date : 5/4/2023  Surgeon: De Cadet M.D. Pre-procedure Diagnosis: ESRD, Poorly functioning fistula  Post-procedure Diagnosis: Same  Procedure:     US guided access of the Left basilic vein   Left upper extremity fistulagram   Plasty of L brachiocephalic vein with 7S80 mustang, 10x4 DCB impact  Anesthesia: Local   Assistants: Cath Lab Staff  Estimated Blood Loss: Minimal  Complications: none  Findings:   Left S/p Intervention   Proximal  Patent Patent   Distal Basilic Vein Patent Patent   Axillary Vein Patent Patent   Subclavian Vein Patent Patent   Brachiocephalic Vein > 14% stenosis Patent, < 30% residual stenosis   Superior Vena Cava Patent Patent     Procedure Details :  Timeout preformed identifying pt and procedure. Left arm prepped and draped in sterile fashion. The Left basilic vein-fistula was identified under US and noted to be patent. It was accessed under US guidance after infiltrating with local. Printer used to print a still image. Micropuncture placed, exchanged out for 4 fr sheath. Fistulogram preformed. There was a high grade stenosis of the > 90% stenosis . A sheath exchange with a 7 fr sheath was done. Angle glide cath and glide wire used to traverse stenosis. The lesion was plastied with 9x40 mustang, 10x40 DCB balloon. There was evidence of some residual stenosis. Completion fistulogram noted much improved filling distally and brisk flow though the stenotic area. Sheath and wire were removed and pressure was held.       Postop Exam  Left arm   - Radial `1+   - Thrill is has significantly improved    Plan  No plans for surgical intervention  Ok to use in dialysis   Misty Hurley for dc from vascular pov after dialysis    De Cadet MD    PCP : Genia Stapleton DO  Nephrologist : Dr. Debria Duverney : [facility] MWF      Previous Vascular Access Procedures  8/23/16 MARIETTA SARMIENTO

## 2023-05-04 NOTE — ED NOTES
Dr. Nasir Riojas at bedside. Consent signed and placed in soft chart.       Alina Bettencourt RN  05/04/23 1056

## 2023-05-04 NOTE — H&P
Hospitalist History & Physical      PATIENT NAME:  Katelyn Franklni    MRN:  [id number]  SERVICE DATE:  05/04/23    Primary Care Physician: Libertad Dickey DO       SUBJECTIVE  CHIEF COMPLAINT:  had concerns including Hypoglycemia (went to dialysis center today but found to have a BGL of 49, only ate chicken after taking insulin this AM, was given two oral liquid glucose, BGL increased to 93 ) and Vascular Access Problem (Has not had full dialysis since 4/29, clogged dialysis port, ). HPI:  Ms. Katelyn Franklin, a 44y.o. year old female  who  has a past medical history of JOLLY (acute kidney injury) ([facility] AVF (arteriovenous fistula) ([facility] Chronic kidney disease, Dialysis AV fistula malfunction, [facility] encounter ([facility] DM type 1 (diabetes mellitus, type 1) ([facility] Encounter regarding vascular access for dialysis for ESRD ([facility] ESRD (end stage renal disease) ([facility] Hemodialysis patient ([facility] Hypertension, and Tobacco abuse. presents with Hypoglycemia (went to dialysis center today but found to have a BGL of 49, only ate chicken after taking insulin this AM, was given two oral liquid glucose, BGL increased to 93 ) and Vascular Access Problem (Has not had full dialysis since 4/29, clogged dialysis port, )  , denies fever or chills no nausea or vomiting, already consulted with Dr VERNON vascular surgery and planning for fistulogram and nephrology to [facility] then. Last IHD she had was 5 days ago, has worsening leg swelling.      PAST MEDICAL HISTORY:    Past Medical History:   Diagnosis Date    JOLLY (acute kidney injury) ([facility] 4/5/2016    AVF (arteriovenous fistula) ([facility] 02/19/2018    let arm    Chronic kidney disease     Dialysis AV fistula malfunction, initial encounter ([facility] 11/7/2019    DM type 1 (diabetes mellitus, type 1) ([facility]     Encounter regarding vascular access for dialysis for ESRD ([facility] 07/12/2018    ESRD (end stage renal disease) ([facility]     Hemodialysis patient ([facility]     amrita

## 2023-05-04 NOTE — FLOWSHEET NOTE
05/04/23 1900   Vital Signs   BP (!) 154/85   Temp 98 °F (36.7 °C)   Pulse 87   Respirations 18   SpO2 100 %   Weight - Scale   (cart)   Pain Assessment   Pain Assessment None - Denies Pain   Post-Hemodialysis Assessment   Post-Treatment Procedures Blood returned; Access bleeding time < 10 minutes   Machine Disinfection Process Acid/Vinegar Clean;Heat Disinfect; Exterior Machine Disinfection   Rinseback Volume (ml) 300 ml   Blood Volume Processed (Liters) 78.2 l/min   Dialyzer Clearance Moderately streaked   Duration of Treatment (minutes) 210 minutes   Heparin Amount Administered During Treatment (mL) 0 mL   Hemodialysis Intake (ml) 300 ml   Hemodialysis Output (ml) 3300 ml   NET Removed (ml) 3000   Tolerated Treatment Good   Patient Response to Treatment Tolerated well; all blood rinsed back; needles removed; pressure held till stasis achieved; gauze, band aids and paper tape applied to needle sites; (+) thrill/bruit above and below needle sites; net UF 3 L; BVP 78.6 L; post VSS; patient eating supper; report called to Anum Burkett RN   Bilateral Breath Sounds Clear;Diminished   Edema Generalized;Right upper extremity; Left upper extremity;Right lower extremity; Left lower extremity; Facial   Edema Generalized +1   RUE Edema +1   LUE Edema +3;Pitting   RLE Edema +1;Pitting   LLE Edema +1;Pitting   Facial Edema +1   Physician Notified No   Time Off 1856   Patient Disposition Return to room

## 2023-05-04 NOTE — ED PROVIDER NOTES
ED PROVIDER NOTE    Chief Complaint   Patient presents with    Hypoglycemia     went to dialysis center today but found to have a BGL of 49, only ate chicken after taking insulin this AM, was given two oral liquid glucose, BGL increased to 93     Vascular Access Problem     Has not had full dialysis since 4/29, clogged dialysis port,        HPI:  5/4/23,   Time: 12:59 PM EDT       Scott Howe is a 44 y.o. female presenting to the ED for left arm swelling. Ongoing for the last 1 to 2 weeks, progressively worsening, moderate in severity. History of end-stage renal disease on dialysis. Last received dialysis 5 days ago. Makes small amount of urine output once a day. Today went to dialysis and was told to come to the ED because of her left upper extremity swelling. Her fistula was not accessed due to the swelling. Has associated pain at the site. No associated numbness or tingling in the extremity. No associated fever, chills, cough, chest pain, shortness of breath, abdominal pain, nausea, vomiting, diarrhea. Normal p.o. intake and urine output. No leg swelling. Patient was also noted to be hypoglycemic at dialysis today. She states that she took her insulin dose as she normally does this morning but only had 1 small piece of chicken. Received oral dextrose prior to arrival. BG on arrival to ED is 44. Treated w/ D50 upon arrival to ED.     Chart review: hx of ESRD on HD, DM1, HTN    Reviewed office visit note by Dr. Camron Kapadia from 5/1/23:  A/P LUE swelling and edema, poorly functioning AVF  Plan for LUE fistulagram, intervention    Review of Systems:     Review of Systems  Pertinent positives and negatives as stated in HPI     --------------------------------------------- PAST HISTORY ---------------------------------------------  Past Medical History:   Past Medical History:   Diagnosis Date    JOLLY (acute kidney injury) ([facility] 4/5/2016    AVF (arteriovenous fistula) ([facility] 02/19/2018    let

## 2023-05-04 NOTE — DISCHARGE INSTRUCTIONS
Discharge Instructions for Fistulagram      Call Dr. Gena Arreola office [phone] for follow-up appointment. An fistulagram , is an x-ray used by physicians to diagnose blockages and other problems in veins. A catheter is inserted into a blood vessel, and a special dye is injected that makes blood vessels visible when an x-ray is taken. The procedure can take one hour to several hours. Afterwards, you may need to be in a recovery area for an hour, where you will be monitored by a nurse. What You Will Need   Along with your medications, you will need bandages   Steps to [address] the dressing around the catheter incision area as instructed. Bathe and shower as usual, but keep the wound dry and covered with a bandage for the first day after surgery. Diet    You may go back to your regular diet after the procedure. Physical Activity    Do not lift heavy objects or engage in any strenuous exercise or sexual activity for a minimum of 24 hours. Ask your doctor when you will be able to return to work. Do not drive until your doctor tells you to do so. Medications    If you are taking medications, follow these general guidelines:   Talk to your doctor about what the best pain relief medicine for you is. Take your medication as directednot more, not less, not at a different time. If you are taking warfarin or persantine, your doctor will tell you when to stop these. Do not stop taking your medications without consulting your healthcare provider. Don't share medications with anyone else. Know what effects and side effects to expect, and report them to your healthcare provider. If you are taking more than one drug, even if it is an over-the-counter medication, herb, or dietary supplement, be sure to check with a physician or pharmacist about drug interactions. Plan ahead for refills so you don't run out.    Follow-up   Your doctor will discuss the findings and suggest
other

## 2023-05-05 VITALS
WEIGHT: 141.76 LBS | DIASTOLIC BLOOD PRESSURE: 46 MMHG | OXYGEN SATURATION: 99 % | SYSTOLIC BLOOD PRESSURE: 126 MMHG | HEART RATE: 94 BPM | TEMPERATURE: 98.6 F | RESPIRATION RATE: 18 BRPM | BODY MASS INDEX: 28.58 KG/M2 | HEIGHT: 59 IN

## 2023-05-05 LAB
ALBUMIN SERPL-MCNC: 3.3 G/DL (ref 3.5–5.2)
ALP SERPL-CCNC: 110 U/L (ref 35–104)
ALT SERPL-CCNC: 23 U/L (ref 0–32)
ANION GAP SERPL CALCULATED.3IONS-SCNC: 12 MMOL/L (ref 7–16)
AST SERPL-CCNC: 36 U/L (ref 0–31)
BASOPHILS # BLD: 0.01 E9/L (ref 0–0.2)
BASOPHILS NFR BLD: 0.2 % (ref 0–2)
BILIRUB SERPL-MCNC: 0.3 MG/DL (ref 0–1.2)
BUN SERPL-MCNC: 35 MG/DL (ref 6–20)
CALCIUM SERPL-MCNC: 8.5 MG/DL (ref 8.6–10.2)
CHLORIDE SERPL-SCNC: 98 MMOL/L (ref 98–107)
CO2 SERPL-SCNC: 26 MMOL/L (ref 22–29)
CREAT SERPL-MCNC: 7.3 MG/DL (ref 0.5–1)
EOSINOPHIL # BLD: 0.16 E9/L (ref 0.05–0.5)
EOSINOPHIL NFR BLD: 3.9 % (ref 0–6)
ERYTHROCYTE [DISTWIDTH] IN BLOOD BY AUTOMATED COUNT: 18.3 FL (ref 11.5–15)
GLUCOSE SERPL-MCNC: 226 MG/DL (ref 74–99)
HCT VFR BLD AUTO: 26.6 % (ref 34–48)
HGB BLD-MCNC: 8.8 G/DL (ref 11.5–15.5)
IMM GRANULOCYTES # BLD: 0.02 E9/L
IMM GRANULOCYTES NFR BLD: 0.5 % (ref 0–5)
LYMPHOCYTES # BLD: 1.33 E9/L (ref 1.5–4)
LYMPHOCYTES NFR BLD: 32.4 % (ref 20–42)
MAGNESIUM SERPL-MCNC: 2.2 MG/DL (ref 1.6–2.6)
MCH RBC QN AUTO: 32.2 PG (ref 26–35)
MCHC RBC AUTO-ENTMCNC: 33.1 % (ref 32–34.5)
MCV RBC AUTO: 97.4 FL (ref 80–99.9)
METER GLUCOSE: 213 MG/DL (ref 74–99)
MONOCYTES # BLD: 0.47 E9/L (ref 0.1–0.95)
MONOCYTES NFR BLD: 11.4 % (ref 2–12)
NEUTROPHILS # BLD: 2.12 E9/L (ref 1.8–7.3)
NEUTS SEG NFR BLD: 51.6 % (ref 43–80)
PHOSPHATE SERPL-MCNC: 4.8 MG/DL (ref 2.5–4.5)
PLATELET # BLD AUTO: 125 E9/L (ref 130–450)
PMV BLD AUTO: 9.8 FL (ref 7–12)
POTASSIUM SERPL-SCNC: 4.9 MMOL/L (ref 3.5–5)
POTASSIUM SERPL-SCNC: 4.9 MMOL/L (ref 3.5–5)
PROT SERPL-MCNC: 6.4 G/DL (ref 6.4–8.3)
RBC # BLD AUTO: 2.73 E12/L (ref 3.5–5.5)
SODIUM SERPL-SCNC: 136 MMOL/L (ref 132–146)
WBC # BLD: 4.1 E9/L (ref 4.5–11.5)

## 2023-05-05 PROCEDURE — 82962 GLUCOSE BLOOD TEST: CPT

## 2023-05-05 PROCEDURE — 84100 ASSAY OF PHOSPHORUS: CPT

## 2023-05-05 PROCEDURE — 80053 COMPREHEN METABOLIC PANEL: CPT

## 2023-05-05 PROCEDURE — 80048 BASIC METABOLIC PNL TOTAL CA: CPT

## 2023-05-05 PROCEDURE — 85025 COMPLETE CBC W/AUTO DIFF WBC: CPT

## 2023-05-05 PROCEDURE — 36415 COLL VENOUS BLD VENIPUNCTURE: CPT

## 2023-05-05 PROCEDURE — 83735 ASSAY OF MAGNESIUM: CPT

## 2023-05-05 PROCEDURE — 90935 HEMODIALYSIS ONE EVALUATION: CPT

## 2023-05-05 PROCEDURE — 6370000000 HC RX 637 (ALT 250 FOR IP)

## 2023-05-05 PROCEDURE — 6370000000 HC RX 637 (ALT 250 FOR IP): Performed by: INTERNAL MEDICINE

## 2023-05-05 RX ORDER — CLOPIDOGREL BISULFATE 75 MG/1
75 TABLET ORAL DAILY
Qty: 30 TABLET | Refills: 5 | Status: SHIPPED | OUTPATIENT
Start: 2023-05-05 | End: 2023-05-10

## 2023-05-05 RX ORDER — CARVEDILOL 25 MG/1
25 TABLET ORAL NIGHTLY
Qty: 60 TABLET | Refills: 3 | Status: SHIPPED | OUTPATIENT
Start: 2023-05-05

## 2023-05-05 RX ORDER — CARVEDILOL 25 MG/1
50 TABLET ORAL EVERY MORNING
Qty: 60 TABLET | Refills: 3 | Status: SHIPPED | OUTPATIENT
Start: 2023-05-06

## 2023-05-05 RX ORDER — CLOPIDOGREL BISULFATE 75 MG/1
75 TABLET ORAL DAILY
Status: DISCONTINUED | OUTPATIENT
Start: 2023-05-05 | End: 2023-05-05 | Stop reason: HOSPADM

## 2023-05-05 RX ADMIN — CLOPIDOGREL BISULFATE 75 MG: 75 TABLET ORAL at 09:29

## 2023-05-05 RX ADMIN — CARVEDILOL 50 MG: 25 TABLET, FILM COATED ORAL at 09:29

## 2023-05-05 RX ADMIN — FAMOTIDINE 20 MG: 20 TABLET, FILM COATED ORAL at 09:30

## 2023-05-05 RX ADMIN — INSULIN LISPRO 1 UNITS: 100 INJECTION, SOLUTION INTRAVENOUS; SUBCUTANEOUS at 09:32

## 2023-05-05 NOTE — DISCHARGE SUMMARY
Hospital Medicine Discharge Summary    Patient ID: Deep Cadet      Patient's PCP: Nicko Farfan DO    Admit Date: 5/4/2023     Discharge Date:   5/5/2023    Admitting Physician: Gail Major MD     Discharge Physician: Beny Monroe MD     Discharge Diagnoses: Active Hospital Problems    Diagnosis Date Noted    ESRD on hemodialysis ([email or web address] [N18.6, Z99.2] 05/04/2023    ESRD (end stage renal disease) ([email or web address] [N18.6] 05/04/2023    Encounter regarding vascular access for dialysis for ESRD ([email or web address] [L56.6, Z99.2] 07/12/2018       The patient was seen and examined on day of discharge and this discharge summary is in conjunction with any daily progress note from day of discharge. Hospital Course: 43 yo female with history of ESRD on HD, HTN, T1DM presented to the hospital after being found to be hypoglycemic with BG of 49 at dialysis. She was also noted to have issues with her vascular access and was unable to undergo a full dialysis session due to a clogged dialysis port. Vascular surgery was consulted who performed an US guided access of the Left basilic vein, Left upper extremity fistulagram with Plasty of L brachiocephalic vein with 5R87 mustang, 10x4 DCB impact. Patient underwent dialysis this morning and per Vascular Surgery and Nephrology patient is okay to be discharged with plan for outpatient follow up. Exam:   BP (!) 151/77   Pulse 89   Temp 97.3 °F (36.3 °C)   Resp 18   Ht 4' 11.02\" (1.499 m)   Wt 148 lb 13 oz (67.5 kg)   SpO2 100%   BMI 30.04 kg/m²     General appearance: No apparent distress, appears stated age and cooperative. HEENT: Pupils equal, round, and reactive to light. Conjunctivae/corneas clear. Neck: Supple, with full range of motion. No jugular venous distention. Trachea midline. Respiratory:  Normal respiratory effort. Clear to auscultation, bilaterally without Rales/Wheezes/Rhonchi.   Cardiovascular: Regular rate and rhythm with normal S1/S2 without

## 2023-05-05 NOTE — FLOWSHEET NOTE
05/05/23 1533   Vital Signs   BP (!) 143/72   Temp 97.3 °F (36.3 °C)   Pulse 89   Respirations 18   Weight - Scale 141 lb 12.1 oz (64.3 kg)   Weight Method Bed scale   Percent Weight Change -4.74   Pain Assessment   Pain Assessment None - Denies Pain   Post-Hemodialysis Assessment   Post-Treatment Procedures Blood returned; Access bleeding time < 10 minutes   Machine Disinfection Process Exterior Machine Disinfection   Rinseback Volume (ml) 300 ml   Blood Volume Processed (Liters) 78.6 l/min   Dialyzer Clearance Moderately streaked   Duration of Treatment (minutes) 210 minutes   Heparin Amount Administered During Treatment (mL) 0 mL   Hemodialysis Intake (ml) 300 ml   Hemodialysis Output (ml) 3500 ml   NET Removed (ml) 3200   Tolerated Treatment Good   Patient Response to Treatment tolerated well   Bilateral Breath Sounds Diminished   Edema Left upper extremity   LUE Edema +3;Pitting   Facial Edema +2   Physician Notified No   Time Off 4918   Patient Disposition Return to room

## 2023-05-05 NOTE — PROGRESS NOTES
Discharge instrictions reviewed with patient at the bedside. All questions answered at at this time. Patient provided with [employer].  Awaiting call of taxi arrival.
Dr. Bailey Lindsey would like dialysis for patient before discharging. Dialysis contacted and patient on the schedule for 11 am today.
Patient currently refusing telemetry monitoring
[facility]  Nephrology Progress Note    Patient's Name: Shoaib Patel    History of Present Illness from 5/4 Consult Note:  \"A full consult is deferred as patient known to our service and is followed by our service in the outpatient dialysis setting. Briefly, Shoaib Patel is a 44 y.o. female with a past medical history of ESRD, hypertension, and diabetes mellitus. She presented to the ED on 5/4 with reports of hypoglycemia and vascular access problem (left arm swelling). Vital signs on 5/4 include temperature 98.1, respirations 18, pulse 84, /72, and she was 95% SPO2. Lab data on 5/4 includes BUN 71, creatinine 12, and hemoglobin 9.5. She underwent left upper extremity fistulogram and plasty on 5/4 with Dr. Yanet Melendez. Our service was consulted to see the patient for ESRD on HD. Patient is known to our service and dialyzes at [facility] second shift via left arm AV fistula. At present, patient was seen and examined. She reports that she came in due to arm swelling. She also reports that she had nausea and vomiting earlier today. She denies any dizziness or fevers. \"    Subjective:    5/5: Patient was seen and examined. She reports that she feels fine. She denies any pain.     PMH:    Past Medical History:   Diagnosis Date    JOLLY (acute kidney injury) ([address] 4/5/2016    AVF (arteriovenous fistula) ([address] 02/19/2018    let arm    Chronic kidney disease     Dialysis AV fistula malfunction, initial encounter ([address] 11/7/2019    DM type 1 (diabetes mellitus, type 1) ([address]     Encounter regarding vascular access for dialysis for ESRD ([address] 07/12/2018    ESRD (end stage renal disease) ([address]     Hemodialysis patient ([address]     amrita dawson mon wed fri / graft in left arm    Hypertension     Tobacco abuse 4/5/2016       Patient Active Problem List   Diagnosis    Type 1 diabetes mellitus with chronic kidney disease on chronic dialysis ([address]    ESRD (end stage renal disease) on
Vascular Surgery Progress Note    Pt is being seen in f/u today regarding LUE swelling    Subjective  Pt s/e. Pt states that she is doing well. Reports going through dialysis yesterday without any issues. Denies any pain around cannulation sites.     Current Medications:    dextrose      sodium chloride        albuterol, glucose, dextrose bolus **OR** dextrose bolus, glucagon (rDNA), dextrose, sodium chloride flush, sodium chloride, ondansetron **OR** ondansetron, senna, bisacodyl, acetaminophen **OR** acetaminophen    carvedilol  25 mg Oral Nightly    carvedilol  50 mg Oral QAM    famotidine  20 mg Oral QAM    sodium chloride flush  10 mL IntraVENous 2 times per day    insulin glargine  5 Units SubCUTAneous Nightly    insulin lispro  0-4 Units SubCUTAneous TID WC    insulin lispro  0-4 Units SubCUTAneous Nightly        PHYSICAL EXAM:    /78   Pulse 88   Temp (!) 96.6 °F (35.9 °C) (Temporal)   Resp 18   Ht 4' 11.02\" (1.499 m)   Wt 149 lb 8 oz (67.8 kg)   SpO2 100%   BMI 30.18 kg/m²     Intake/Output Summary (Last 24 hours) at 5/5/2023 0615  Last data filed at 5/4/2023 1900  Gross per 24 hour   Intake 300 ml   Output 3300 ml   Net -3000 ml          Gen: awake, alert and oriented x3, no apparent distress  CVS: RR and normotensive  Resp: No increased work of breathing on room air  Abd: Soft, non-tender, non-distended  LUE: Diffuse swelling from arm to hand, puncture site c/d/I, no bleeding, palpable thrill    LABS:    Lab Results   Component Value Date    WBC 5.5 05/04/2023    HGB 9.5 (L) 05/04/2023    HCT 29.1 (L) 05/04/2023     05/04/2023    PROTIME 13.6 (H) 02/11/2023    INR 1.2 02/11/2023    APTT 32.7 02/11/2023    K 4.8 05/04/2023    BUN 28 (H) 05/04/2023    CREATININE 6.2 (H) 05/04/2023       A/P  44 y.o. female with LUE swelling from AVF to hand s/p LUE fistulogram with brachiocephalic vein angioplasty 5/4     - Okay for diet as tolerated  - Pain control PRN  - Medical management per
months. Advised pt to call sooner with any new issues.     Chela cMdonnell, APRN - CNP
Right arm;

## 2023-05-05 NOTE — CARE COORDINATION
05/05/23 Transition of care: Patient admitted after fistulogram for dialysis. AV fistula working per notes. To dialysis this am. She is alert and oriented. She is at home with her daughter. She does drive. She attends [facility] for dialysis T-Th-Sat. She follows with Dr Joshua Fernández and her pharmacy is [facility] on [address] [place] [address]. Her plan is to return home and will discharge today after dialysis and seen by pcp. Per [facility] ok to discharge. Electronically signed by Delmis Ramos RN CM on 5/5/2023 at 2:20 PM       Case Management Assessment  Initial Evaluation    Date/Time of Evaluation: 5/5/2023 2:21 PM  Assessment Completed by: Delmis Ramos RN    If patient is discharged prior to next notation, then this note serves as note for discharge by case management. Patient Name: Fátima Christensen                   YOB: 1985  Diagnosis: Hypoglycemia [E16.2]  ESRD (end stage renal disease) ([facility] [N18.6]  ESRD on hemodialysis ([facility] [N18.6, Z99.2]  Edema of left upper extremity [R60.0]                   Date / Time: 5/4/2023 12:09 PM    Patient Admission Status: Inpatient   Readmission Risk (Low < 19, Mod (19-27), High > 27): Readmission Risk Score: 24    Current PCP: Joshua Fernández, DO  PCP verified by CM? Yes    Chart Reviewed: Yes      History Provided by: Patient  Patient Orientation: Alert and Oriented    Patient Cognition: Alert    Hospitalization in the last 30 days (Readmission):  No    If yes, Readmission Assessment in CM Navigator will be completed.     Advance Directives:      Code Status: Full Code   Patient's Primary Decision Maker is: Patient Declined (Legal Next of Kin Remains as Decision Maker)    Primary Decision Maker: Ilir Thompson - Brother/Sister - [phone]    Secondary Decision Maker: Godfrey Wilder - Brother/Sister - [phone]    Supplemental (Other) Decision Maker: Antonio Gilliland - Other - [phone]    Supplemental (Other) Decision Maker: Jhoan Kramer

## 2023-05-08 LAB
EKG ATRIAL RATE: 84 BPM
EKG P AXIS: 44 DEGREES
EKG P-R INTERVAL: 132 MS
EKG Q-T INTERVAL: 378 MS
EKG QRS DURATION: 62 MS
EKG QTC CALCULATION (BAZETT): 446 MS
EKG R AXIS: 26 DEGREES
EKG T AXIS: 2 DEGREES
EKG VENTRICULAR RATE: 84 BPM

## 2023-05-08 PROCEDURE — 93010 ELECTROCARDIOGRAM REPORT: CPT | Performed by: INTERNAL MEDICINE

## 2023-05-10 RX ORDER — CLOPIDOGREL BISULFATE 75 MG/1
75 TABLET ORAL DAILY
Qty: 90 TABLET | Refills: 3 | Status: SHIPPED | OUTPATIENT
Start: 2023-05-10

## 2023-05-23 RX ORDER — PSEUDOEPHED/ACETAMINOPH/DIPHEN 30MG-500MG
TABLET ORAL
Qty: 120 TABLET | Refills: 0 | Status: SHIPPED | OUTPATIENT
Start: 2023-05-23

## 2023-05-23 NOTE — TELEPHONE ENCOUNTER
Last Appointment:  3/31/2023  Future Appointments   Date Time Provider Titus Potter   5/24/2023  2:00 PM Mariajose Hanson MD [facility]   6/30/2023 11:00 AM Tessie Birmingham [facility]   11/6/2023 10:45 AM Kong Avitia MD [facility]

## 2023-05-24 ENCOUNTER — OFFICE VISIT (OUTPATIENT)
Dept: ENDOCRINOLOGY | Age: 38
End: 2023-05-24
Payer: COMMERCIAL

## 2023-05-24 ENCOUNTER — TELEPHONE (OUTPATIENT)
Dept: ENDOCRINOLOGY | Age: 38
End: 2023-05-24

## 2023-05-24 VITALS
OXYGEN SATURATION: 98 % | BODY MASS INDEX: 29.84 KG/M2 | HEIGHT: 59 IN | SYSTOLIC BLOOD PRESSURE: 120 MMHG | DIASTOLIC BLOOD PRESSURE: 59 MMHG | WEIGHT: 148 LBS | HEART RATE: 102 BPM

## 2023-05-24 DIAGNOSIS — E10.69 TYPE 1 DIABETES MELLITUS WITH OTHER SPECIFIED COMPLICATION (HCC): Primary | ICD-10-CM

## 2023-05-24 DIAGNOSIS — E10.69 TYPE 1 DIABETES MELLITUS WITH OTHER SPECIFIED COMPLICATION (HCC): ICD-10-CM

## 2023-05-24 DIAGNOSIS — Z91.119 DIETARY NONCOMPLIANCE: ICD-10-CM

## 2023-05-24 PROCEDURE — 99204 OFFICE O/P NEW MOD 45 MIN: CPT | Performed by: INTERNAL MEDICINE

## 2023-05-24 PROCEDURE — 1111F DSCHRG MED/CURRENT MED MERGE: CPT | Performed by: INTERNAL MEDICINE

## 2023-05-24 PROCEDURE — G8417 CALC BMI ABV UP PARAM F/U: HCPCS | Performed by: INTERNAL MEDICINE

## 2023-05-24 PROCEDURE — 3078F DIAST BP <80 MM HG: CPT | Performed by: INTERNAL MEDICINE

## 2023-05-24 PROCEDURE — 3074F SYST BP LT 130 MM HG: CPT | Performed by: INTERNAL MEDICINE

## 2023-05-24 PROCEDURE — 4004F PT TOBACCO SCREEN RCVD TLK: CPT | Performed by: INTERNAL MEDICINE

## 2023-05-24 PROCEDURE — 2022F DILAT RTA XM EVC RTNOPTHY: CPT | Performed by: INTERNAL MEDICINE

## 2023-05-24 PROCEDURE — G8427 DOCREV CUR MEDS BY ELIG CLIN: HCPCS | Performed by: INTERNAL MEDICINE

## 2023-05-24 PROCEDURE — 3051F HG A1C>EQUAL 7.0%<8.0%: CPT | Performed by: INTERNAL MEDICINE

## 2023-05-24 RX ORDER — INSULIN PMP CART,AUT,G6/7,CNTR
EACH SUBCUTANEOUS
Qty: 10 EACH | Refills: 11 | Status: SHIPPED | OUTPATIENT
Start: 2023-05-24 | End: 2023-05-25 | Stop reason: SDUPTHER

## 2023-05-24 RX ORDER — INSULIN PMP CART,AUT,G6/7,CNTR
1 EACH SUBCUTANEOUS ONCE
Qty: 1 KIT | Refills: 0 | Status: SHIPPED | OUTPATIENT
Start: 2023-05-24 | End: 2023-05-25 | Stop reason: SDUPTHER

## 2023-05-24 NOTE — PROGRESS NOTES
following lab:  Lab Results   Component Value Date/Time    WBC 4.1 (L) 05/05/2023 06:16 AM    RBC 2.73 (L) 05/05/2023 06:16 AM    RBC 2.92 (L) 02/22/2022 08:11 PM    HGB 8.8 (L) 05/05/2023 06:16 AM    HCT 26.6 (L) 05/05/2023 06:16 AM    MCV 97.4 05/05/2023 06:16 AM    MCH 32.2 05/05/2023 06:16 AM    MCHC 33.1 05/05/2023 06:16 AM    RDW 18.3 (H) 05/05/2023 06:16 AM     (L) 05/05/2023 06:16 AM    MPV 9.8 05/05/2023 06:16 AM    BANDS 2 05/01/2013 05:14 AM      Lab Results   Component Value Date/Time     05/05/2023 06:16 AM    K 4.9 05/05/2023 06:16 AM    K 4.9 05/05/2023 06:16 AM    CO2 26 05/05/2023 06:16 AM    BUN 35 (H) 05/05/2023 06:16 AM    CREATININE 7.3 (H) 05/05/2023 06:16 AM    CALCIUM 8.5 (L) 05/05/2023 06:16 AM    LABGLOM 7 05/05/2023 06:16 AM    GFRAA 5 07/29/2022 01:14 AM      Lab Results   Component Value Date/Time    TSH 2.900 03/24/2023 05:15 AM    T4FREE 1.10 01/27/2021 09:52 PM     Lab Results   Component Value Date/Time    LABA1C 7.8 05/04/2023 08:31 PM    GLUCOSE 226 05/05/2023 06:16 AM    GLUCOSE 279 07/27/2011 04:40 PM    LABCREA 61 08/17/2016 04:00 PM     Lab Results   Component Value Date/Time    LABA1C 7.8 05/04/2023 08:31 PM    LABA1C 7.3 03/23/2023 05:19 AM    LABA1C 7.6 02/12/2023 05:10 AM     Lab Results   Component Value Date/Time    TRIG 68 10/08/2013 10:40 AM    HDL 40 01/29/2021 06:14 AM    LDLCALC 41 01/29/2021 06:14 AM    CHOL 155 10/08/2013 10:40 AM     Lab Results   Component Value Date/Time    VITD25 64 03/24/2023 05:15 AM    VITD25 67 02/12/2023 05:10 AM       ASSESSMENT & RECOMMENDATIONS   Manju Malik, a 44 y.o.-old female seen in for the following issues     Diabetes Mellitus Type   1  Patient's diabetes is uncontrol   Change Lantus 14 units at night   Take Humalog 6 units with meals + low dose  sliding scale   Will also order insulin pump + CGM    Discussed with patient A1c and blood sugar goals   Patient will need routine diabetes maintenance and

## 2023-05-24 NOTE — PATIENT INSTRUCTIONS
Recommendations for today's visit  Change Lantus 14 units at night   Take Humalog 6 units with meals + following sliding scale   Blood sugar 150-200: add 1 Units of Humalog  Blood sugar 201-250 : Add 2 Units of Humalog  Blood Sugar 251 - 300: Add 3 Units of Humalog  Blood Sugar 301-350: Add 4  Units of Humalog  Blood Sugar 350-400: Add 5 Units of Humalog  Blood sugar  >400: Add 6 Units of Humalog     Check Blood sugar 4 times/day before meals and at bedtime and send us sugar log in a week      These are your blood sugar, blood pressure, cholesterol and A1c goals:  Blood sugar fastin mg/dl to 130 mg/dl  Blood sugar before meals: <150 mg/dl  Peak blood sugar lower than 180 mg/dl  A1c: between 6.5 - 7.5%    I you have any questions please call Dr. Di Quiñonez office     Sada Duarte MD  Endocrinologist, [facility])   [address] [place] [address], [address] [place] [address],[facility] [id number]   Phone: [phone]  Fax: [phone]  Email: [email or web address]

## 2023-05-25 RX ORDER — INSULIN PMP CART,AUT,G6/7,CNTR
1 EACH SUBCUTANEOUS ONCE
Qty: 1 KIT | Refills: 0 | Status: SHIPPED | OUTPATIENT
Start: 2023-05-25 | End: 2023-05-25

## 2023-05-25 RX ORDER — INSULIN PMP CART,AUT,G6/7,CNTR
EACH SUBCUTANEOUS
Qty: 30 EACH | Refills: 3 | Status: SHIPPED | OUTPATIENT
Start: 2023-05-25

## 2023-05-31 DIAGNOSIS — E10.69 TYPE 1 DIABETES MELLITUS WITH OTHER SPECIFIED COMPLICATION (HCC): ICD-10-CM

## 2023-05-31 RX ORDER — INSULIN PMP CART,AUT,G6/7,CNTR
EACH SUBCUTANEOUS
Qty: 30 EACH | Refills: 3 | Status: SHIPPED | OUTPATIENT
Start: 2023-05-31

## 2023-05-31 RX ORDER — INSULIN PMP CART,AUT,G6/7,CNTR
1 EACH SUBCUTANEOUS ONCE
Qty: 1 KIT | Refills: 0 | Status: SHIPPED | OUTPATIENT
Start: 2023-05-31 | End: 2023-05-31

## 2023-06-01 ENCOUNTER — APPOINTMENT (OUTPATIENT)
Dept: GENERAL RADIOLOGY | Age: 38
DRG: 193 | End: 2023-06-01
Payer: MEDICARE

## 2023-06-01 ENCOUNTER — HOSPITAL ENCOUNTER (INPATIENT)
Age: 38
LOS: 3 days | Discharge: HOME | DRG: 193 | End: 2023-06-04
Attending: EMERGENCY MEDICINE | Admitting: INTERNAL MEDICINE
Payer: MEDICARE

## 2023-06-01 DIAGNOSIS — J96.01 ACUTE RESPIRATORY FAILURE WITH HYPOXEMIA (HCC): ICD-10-CM

## 2023-06-01 DIAGNOSIS — N17.9 ACUTE RENAL FAILURE ON DIALYSIS (HCC): ICD-10-CM

## 2023-06-01 DIAGNOSIS — R06.00 DYSPNEA AND RESPIRATORY ABNORMALITIES: Primary | ICD-10-CM

## 2023-06-01 DIAGNOSIS — Z99.2 ACUTE RENAL FAILURE ON DIALYSIS (HCC): ICD-10-CM

## 2023-06-01 DIAGNOSIS — J18.9 PNEUMONIA OF BOTH LOWER LOBES DUE TO INFECTIOUS ORGANISM: ICD-10-CM

## 2023-06-01 DIAGNOSIS — K08.89 PAIN, DENTAL: ICD-10-CM

## 2023-06-01 DIAGNOSIS — R06.89 DYSPNEA AND RESPIRATORY ABNORMALITIES: Primary | ICD-10-CM

## 2023-06-01 PROBLEM — R06.02 SOB (SHORTNESS OF BREATH): Status: ACTIVE | Noted: 2023-06-01

## 2023-06-01 LAB
ANION GAP SERPL CALCULATED.3IONS-SCNC: 16 MMOL/L (ref 7–16)
ANISOCYTOSIS: ABNORMAL
B PARAP IS1001 DNA NPH QL NAA+NON-PROBE: NOT DETECTED
B PERT.PT PRMT NPH QL NAA+NON-PROBE: NOT DETECTED
BASOPHILS # BLD: 0.09 E9/L (ref 0–0.2)
BASOPHILS NFR BLD: 1.7 % (ref 0–2)
BUN SERPL-MCNC: 77 MG/DL (ref 6–20)
C PNEUM DNA NPH QL NAA+NON-PROBE: NOT DETECTED
CALCIUM SERPL-MCNC: 8.7 MG/DL (ref 8.6–10.2)
CHLORIDE SERPL-SCNC: 97 MMOL/L (ref 98–107)
CO2 SERPL-SCNC: 24 MMOL/L (ref 22–29)
CREAT SERPL-MCNC: 14.3 MG/DL (ref 0.5–1)
EKG ATRIAL RATE: 82 BPM
EKG P AXIS: 48 DEGREES
EKG P-R INTERVAL: 130 MS
EKG Q-T INTERVAL: 388 MS
EKG QRS DURATION: 66 MS
EKG QTC CALCULATION (BAZETT): 453 MS
EKG R AXIS: 12 DEGREES
EKG T AXIS: 76 DEGREES
EKG VENTRICULAR RATE: 82 BPM
EOSINOPHIL # BLD: 0.21 E9/L (ref 0.05–0.5)
EOSINOPHIL NFR BLD: 4.2 % (ref 0–6)
ERYTHROCYTE [DISTWIDTH] IN BLOOD BY AUTOMATED COUNT: 16.4 FL (ref 11.5–15)
FLUAV RNA NPH QL NAA+NON-PROBE: NOT DETECTED
FLUBV RNA NPH QL NAA+NON-PROBE: NOT DETECTED
GLUCOSE SERPL-MCNC: 105 MG/DL (ref 74–99)
HADV DNA NPH QL NAA+NON-PROBE: NOT DETECTED
HCOV 229E RNA NPH QL NAA+NON-PROBE: NOT DETECTED
HCOV HKU1 RNA NPH QL NAA+NON-PROBE: NOT DETECTED
HCOV NL63 RNA NPH QL NAA+NON-PROBE: NOT DETECTED
HCOV OC43 RNA NPH QL NAA+NON-PROBE: NOT DETECTED
HCT VFR BLD AUTO: 21.9 % (ref 34–48)
HGB BLD-MCNC: 7.5 G/DL (ref 11.5–15.5)
HMPV RNA NPH QL NAA+NON-PROBE: NOT DETECTED
HPIV1 RNA NPH QL NAA+NON-PROBE: NOT DETECTED
HPIV2 RNA NPH QL NAA+NON-PROBE: NOT DETECTED
HPIV3 RNA NPH QL NAA+NON-PROBE: DETECTED
HPIV4 RNA NPH QL NAA+NON-PROBE: NOT DETECTED
HYPOCHROMIA: ABNORMAL
LACTATE BLDV-SCNC: 2.5 MMOL/L (ref 0.5–2.2)
LYMPHOCYTES # BLD: 0.85 E9/L (ref 1.5–4)
LYMPHOCYTES NFR BLD: 16.8 % (ref 20–42)
M PNEUMO DNA NPH QL NAA+NON-PROBE: NOT DETECTED
MCH RBC QN AUTO: 32.3 PG (ref 26–35)
MCHC RBC AUTO-ENTMCNC: 34.2 % (ref 32–34.5)
MCV RBC AUTO: 94.4 FL (ref 80–99.9)
MONOCYTES # BLD: 0.3 E9/L (ref 0.1–0.95)
MONOCYTES NFR BLD: 5.9 % (ref 2–12)
NEUTROPHILS # BLD: 3.55 E9/L (ref 1.8–7.3)
NEUTS SEG NFR BLD: 71.4 % (ref 43–80)
OVALOCYTES: ABNORMAL
PLATELET # BLD AUTO: 154 E9/L (ref 130–450)
PMV BLD AUTO: 9.9 FL (ref 7–12)
POIKILOCYTES: ABNORMAL
POLYCHROMASIA: ABNORMAL
POTASSIUM SERPL-SCNC: 3.9 MMOL/L (ref 3.5–5)
PROCALCITONIN: 16.26 NG/ML (ref 0–0.08)
RBC # BLD AUTO: 2.32 E12/L (ref 3.5–5.5)
RSV RNA NPH QL NAA+NON-PROBE: NOT DETECTED
RV+EV RNA NPH QL NAA+NON-PROBE: NOT DETECTED
SARS-COV-2 RNA NPH QL NAA+NON-PROBE: NOT DETECTED
SODIUM SERPL-SCNC: 137 MMOL/L (ref 132–146)
TARGET CELLS: ABNORMAL
TEAR DROP CELLS: ABNORMAL
WBC # BLD: 5 E9/L (ref 4.5–11.5)

## 2023-06-01 PROCEDURE — 71045 X-RAY EXAM CHEST 1 VIEW: CPT

## 2023-06-01 PROCEDURE — 2140000000 HC CCU INTERMEDIATE R&B

## 2023-06-01 PROCEDURE — 6360000002 HC RX W HCPCS: Performed by: INTERNAL MEDICINE

## 2023-06-01 PROCEDURE — 93010 ELECTROCARDIOGRAM REPORT: CPT | Performed by: INTERNAL MEDICINE

## 2023-06-01 PROCEDURE — 2580000003 HC RX 258: Performed by: EMERGENCY MEDICINE

## 2023-06-01 PROCEDURE — 0202U NFCT DS 22 TRGT SARS-COV-2: CPT

## 2023-06-01 PROCEDURE — 2580000003 HC RX 258: Performed by: INTERNAL MEDICINE

## 2023-06-01 PROCEDURE — 36415 COLL VENOUS BLD VENIPUNCTURE: CPT

## 2023-06-01 PROCEDURE — 80048 BASIC METABOLIC PNL TOTAL CA: CPT

## 2023-06-01 PROCEDURE — 90935 HEMODIALYSIS ONE EVALUATION: CPT

## 2023-06-01 PROCEDURE — 99285 EMERGENCY DEPT VISIT HI MDM: CPT

## 2023-06-01 PROCEDURE — 6360000002 HC RX W HCPCS: Performed by: EMERGENCY MEDICINE

## 2023-06-01 PROCEDURE — 85025 COMPLETE CBC W/AUTO DIFF WBC: CPT

## 2023-06-01 PROCEDURE — 93005 ELECTROCARDIOGRAM TRACING: CPT | Performed by: EMERGENCY MEDICINE

## 2023-06-01 PROCEDURE — 83605 ASSAY OF LACTIC ACID: CPT

## 2023-06-01 PROCEDURE — 5A1D70Z PERFORMANCE OF URINARY FILTRATION, INTERMITTENT, LESS THAN 6 HOURS PER DAY: ICD-10-PCS | Performed by: INTERNAL MEDICINE

## 2023-06-01 PROCEDURE — 84145 PROCALCITONIN (PCT): CPT

## 2023-06-01 RX ORDER — INSULIN GLARGINE-YFGN 100 [IU]/ML
16 INJECTION, SOLUTION SUBCUTANEOUS NIGHTLY
Status: DISCONTINUED | OUTPATIENT
Start: 2023-06-02 | End: 2023-06-04

## 2023-06-01 RX ORDER — HEPARIN SODIUM 10000 [USP'U]/ML
5000 INJECTION, SOLUTION INTRAVENOUS; SUBCUTANEOUS EVERY 8 HOURS SCHEDULED
Status: DISCONTINUED | OUTPATIENT
Start: 2023-06-01 | End: 2023-06-04 | Stop reason: HOSPADM

## 2023-06-01 RX ORDER — ACETAMINOPHEN 650 MG/1
650 SUPPOSITORY RECTAL EVERY 6 HOURS PRN
Status: DISCONTINUED | OUTPATIENT
Start: 2023-06-01 | End: 2023-06-04 | Stop reason: HOSPADM

## 2023-06-01 RX ORDER — HYDRALAZINE HYDROCHLORIDE 50 MG/1
50 TABLET, FILM COATED ORAL EVERY 8 HOURS PRN
Status: DISCONTINUED | OUTPATIENT
Start: 2023-06-01 | End: 2023-06-04 | Stop reason: HOSPADM

## 2023-06-01 RX ORDER — FAMOTIDINE 20 MG/1
20 TABLET, FILM COATED ORAL EVERY MORNING
Status: DISCONTINUED | OUTPATIENT
Start: 2023-06-02 | End: 2023-06-04 | Stop reason: HOSPADM

## 2023-06-01 RX ORDER — ALBUTEROL SULFATE 90 UG/1
2 AEROSOL, METERED RESPIRATORY (INHALATION) EVERY 4 HOURS PRN
Status: DISCONTINUED | OUTPATIENT
Start: 2023-06-01 | End: 2023-06-01 | Stop reason: CLARIF

## 2023-06-01 RX ORDER — PROMETHAZINE HYDROCHLORIDE 12.5 MG/1
12.5 TABLET ORAL EVERY 6 HOURS PRN
Status: DISCONTINUED | OUTPATIENT
Start: 2023-06-01 | End: 2023-06-04 | Stop reason: HOSPADM

## 2023-06-01 RX ORDER — CARVEDILOL 25 MG/1
25 TABLET ORAL NIGHTLY
Status: DISCONTINUED | OUTPATIENT
Start: 2023-06-01 | End: 2023-06-04 | Stop reason: HOSPADM

## 2023-06-01 RX ORDER — SODIUM CHLORIDE 0.9 % (FLUSH) 0.9 %
10 SYRINGE (ML) INJECTION EVERY 12 HOURS SCHEDULED
Status: DISCONTINUED | OUTPATIENT
Start: 2023-06-01 | End: 2023-06-04 | Stop reason: HOSPADM

## 2023-06-01 RX ORDER — CALCIUM ACETATE 667 MG/1
4 CAPSULE ORAL
Status: DISCONTINUED | OUTPATIENT
Start: 2023-06-02 | End: 2023-06-04 | Stop reason: HOSPADM

## 2023-06-01 RX ORDER — ALBUTEROL SULFATE 2.5 MG/3ML
2.5 SOLUTION RESPIRATORY (INHALATION) EVERY 4 HOURS PRN
Status: DISCONTINUED | OUTPATIENT
Start: 2023-06-01 | End: 2023-06-04 | Stop reason: HOSPADM

## 2023-06-01 RX ORDER — SODIUM CHLORIDE 9 MG/ML
INJECTION, SOLUTION INTRAVENOUS PRN
Status: DISCONTINUED | OUTPATIENT
Start: 2023-06-01 | End: 2023-06-04 | Stop reason: HOSPADM

## 2023-06-01 RX ORDER — ACETAMINOPHEN 325 MG/1
650 TABLET ORAL EVERY 6 HOURS PRN
Status: DISCONTINUED | OUTPATIENT
Start: 2023-06-01 | End: 2023-06-04 | Stop reason: HOSPADM

## 2023-06-01 RX ORDER — ONDANSETRON 2 MG/ML
4 INJECTION INTRAMUSCULAR; INTRAVENOUS EVERY 6 HOURS PRN
Status: DISCONTINUED | OUTPATIENT
Start: 2023-06-01 | End: 2023-06-04 | Stop reason: HOSPADM

## 2023-06-01 RX ORDER — SODIUM CHLORIDE 0.9 % (FLUSH) 0.9 %
10 SYRINGE (ML) INJECTION PRN
Status: DISCONTINUED | OUTPATIENT
Start: 2023-06-01 | End: 2023-06-04 | Stop reason: HOSPADM

## 2023-06-01 RX ORDER — CLOPIDOGREL BISULFATE 75 MG/1
75 TABLET ORAL DAILY
Status: DISCONTINUED | OUTPATIENT
Start: 2023-06-02 | End: 2023-06-04 | Stop reason: HOSPADM

## 2023-06-01 RX ORDER — POLYETHYLENE GLYCOL 3350 17 G/17G
17 POWDER, FOR SOLUTION ORAL DAILY PRN
Status: DISCONTINUED | OUTPATIENT
Start: 2023-06-01 | End: 2023-06-04 | Stop reason: HOSPADM

## 2023-06-01 RX ADMIN — VANCOMYCIN HYDROCHLORIDE 1250 MG: 10 INJECTION, POWDER, LYOPHILIZED, FOR SOLUTION INTRAVENOUS at 19:47

## 2023-06-01 RX ADMIN — CEFEPIME 1000 MG: 1 INJECTION, POWDER, FOR SOLUTION INTRAMUSCULAR; INTRAVENOUS at 19:11

## 2023-06-01 RX ADMIN — HEPARIN SODIUM 5000 UNITS: 10000 INJECTION INTRAVENOUS; SUBCUTANEOUS at 23:55

## 2023-06-01 RX ADMIN — CEFTRIAXONE SODIUM 1000 MG: 1 INJECTION, POWDER, FOR SOLUTION INTRAMUSCULAR; INTRAVENOUS at 23:55

## 2023-06-01 ASSESSMENT — PAIN - FUNCTIONAL ASSESSMENT: PAIN_FUNCTIONAL_ASSESSMENT: NONE - DENIES PAIN

## 2023-06-02 LAB
ALBUMIN SERPL-MCNC: 3.1 G/DL (ref 3.5–5.2)
ALP SERPL-CCNC: 111 U/L (ref 35–104)
ALT SERPL-CCNC: 12 U/L (ref 0–32)
ANION GAP SERPL CALCULATED.3IONS-SCNC: 18 MMOL/L (ref 7–16)
AST SERPL-CCNC: 33 U/L (ref 0–31)
BILIRUB SERPL-MCNC: 0.3 MG/DL (ref 0–1.2)
BUN SERPL-MCNC: 24 MG/DL (ref 6–20)
CALCIUM SERPL-MCNC: 8.2 MG/DL (ref 8.6–10.2)
CHLORIDE SERPL-SCNC: 91 MMOL/L (ref 98–107)
CO2 SERPL-SCNC: 20 MMOL/L (ref 22–29)
CREAT SERPL-MCNC: 6.3 MG/DL (ref 0.5–1)
ERYTHROCYTE [DISTWIDTH] IN BLOOD BY AUTOMATED COUNT: 17 FL (ref 11.5–15)
GLUCOSE SERPL-MCNC: 490 MG/DL (ref 74–99)
HBA1C MFR BLD: 8.1 % (ref 4–5.6)
HCT VFR BLD AUTO: 25.2 % (ref 34–48)
HGB BLD-MCNC: 8.5 G/DL (ref 11.5–15.5)
MAGNESIUM SERPL-MCNC: 2 MG/DL (ref 1.6–2.6)
MCH RBC QN AUTO: 32.8 PG (ref 26–35)
MCHC RBC AUTO-ENTMCNC: 33.7 % (ref 32–34.5)
MCV RBC AUTO: 97.3 FL (ref 80–99.9)
METER GLUCOSE: 292 MG/DL (ref 74–99)
METER GLUCOSE: 408 MG/DL (ref 74–99)
PHOSPHATE SERPL-MCNC: 4.9 MG/DL (ref 2.5–4.5)
PLATELET # BLD AUTO: 172 E9/L (ref 130–450)
PMV BLD AUTO: 10.3 FL (ref 7–12)
POTASSIUM SERPL-SCNC: 5.1 MMOL/L (ref 3.5–5)
PROT SERPL-MCNC: 7.2 G/DL (ref 6.4–8.3)
RBC # BLD AUTO: 2.59 E12/L (ref 3.5–5.5)
SODIUM SERPL-SCNC: 129 MMOL/L (ref 132–146)
WBC # BLD: 5.1 E9/L (ref 4.5–11.5)

## 2023-06-02 PROCEDURE — 90935 HEMODIALYSIS ONE EVALUATION: CPT

## 2023-06-02 PROCEDURE — 83735 ASSAY OF MAGNESIUM: CPT

## 2023-06-02 PROCEDURE — 85027 COMPLETE CBC AUTOMATED: CPT

## 2023-06-02 PROCEDURE — 2580000003 HC RX 258: Performed by: INTERNAL MEDICINE

## 2023-06-02 PROCEDURE — 2500000003 HC RX 250 WO HCPCS: Performed by: INTERNAL MEDICINE

## 2023-06-02 PROCEDURE — 36415 COLL VENOUS BLD VENIPUNCTURE: CPT

## 2023-06-02 PROCEDURE — 6360000002 HC RX W HCPCS: Performed by: INTERNAL MEDICINE

## 2023-06-02 PROCEDURE — 83036 HEMOGLOBIN GLYCOSYLATED A1C: CPT

## 2023-06-02 PROCEDURE — 6370000000 HC RX 637 (ALT 250 FOR IP): Performed by: INTERNAL MEDICINE

## 2023-06-02 PROCEDURE — 82962 GLUCOSE BLOOD TEST: CPT

## 2023-06-02 PROCEDURE — 80053 COMPREHEN METABOLIC PANEL: CPT

## 2023-06-02 PROCEDURE — 2140000000 HC CCU INTERMEDIATE R&B

## 2023-06-02 PROCEDURE — S5553 INSULIN LONG ACTING 5 U: HCPCS | Performed by: INTERNAL MEDICINE

## 2023-06-02 PROCEDURE — 84100 ASSAY OF PHOSPHORUS: CPT

## 2023-06-02 RX ORDER — DEXTROSE MONOHYDRATE 100 MG/ML
INJECTION, SOLUTION INTRAVENOUS CONTINUOUS PRN
Status: DISCONTINUED | OUTPATIENT
Start: 2023-06-02 | End: 2023-06-04 | Stop reason: HOSPADM

## 2023-06-02 RX ORDER — INSULIN LISPRO 100 [IU]/ML
0-16 INJECTION, SOLUTION INTRAVENOUS; SUBCUTANEOUS
Status: DISCONTINUED | OUTPATIENT
Start: 2023-06-02 | End: 2023-06-04 | Stop reason: HOSPADM

## 2023-06-02 RX ORDER — INSULIN LISPRO 100 [IU]/ML
0-4 INJECTION, SOLUTION INTRAVENOUS; SUBCUTANEOUS NIGHTLY
Status: DISCONTINUED | OUTPATIENT
Start: 2023-06-02 | End: 2023-06-04 | Stop reason: HOSPADM

## 2023-06-02 RX ADMIN — CALCIUM ACETATE 2668 MG: 667 CAPSULE ORAL at 13:28

## 2023-06-02 RX ADMIN — CARVEDILOL 25 MG: 25 TABLET, FILM COATED ORAL at 20:35

## 2023-06-02 RX ADMIN — DOXYCYCLINE 100 MG: 100 INJECTION, POWDER, LYOPHILIZED, FOR SOLUTION INTRAVENOUS at 20:45

## 2023-06-02 RX ADMIN — Medication 10 ML: at 00:18

## 2023-06-02 RX ADMIN — DOXYCYCLINE 100 MG: 100 INJECTION, POWDER, LYOPHILIZED, FOR SOLUTION INTRAVENOUS at 13:30

## 2023-06-02 RX ADMIN — CLOPIDOGREL BISULFATE 75 MG: 75 TABLET ORAL at 13:28

## 2023-06-02 RX ADMIN — INSULIN GLARGINE-YFGN 16 UNITS: 100 INJECTION, SOLUTION SUBCUTANEOUS at 20:36

## 2023-06-02 RX ADMIN — CEFTRIAXONE SODIUM 1000 MG: 1 INJECTION, POWDER, FOR SOLUTION INTRAMUSCULAR; INTRAVENOUS at 20:33

## 2023-06-02 RX ADMIN — INSULIN LISPRO 4 UNITS: 100 INJECTION, SOLUTION INTRAVENOUS; SUBCUTANEOUS at 21:02

## 2023-06-02 RX ADMIN — Medication 10 ML: at 22:13

## 2023-06-02 RX ADMIN — HEPARIN SODIUM 5000 UNITS: 10000 INJECTION INTRAVENOUS; SUBCUTANEOUS at 13:36

## 2023-06-02 RX ADMIN — HEPARIN SODIUM 5000 UNITS: 10000 INJECTION INTRAVENOUS; SUBCUTANEOUS at 22:00

## 2023-06-02 RX ADMIN — FAMOTIDINE 20 MG: 20 TABLET ORAL at 13:28

## 2023-06-02 RX ADMIN — DOXYCYCLINE 100 MG: 100 INJECTION, POWDER, LYOPHILIZED, FOR SOLUTION INTRAVENOUS at 00:04

## 2023-06-03 ENCOUNTER — APPOINTMENT (OUTPATIENT)
Dept: CT IMAGING | Age: 38
DRG: 193 | End: 2023-06-03
Attending: INTERNAL MEDICINE
Payer: MEDICARE

## 2023-06-03 ENCOUNTER — APPOINTMENT (OUTPATIENT)
Dept: GENERAL RADIOLOGY | Age: 38
DRG: 193 | End: 2023-06-03
Payer: MEDICARE

## 2023-06-03 LAB
ALBUMIN SERPL-MCNC: 3.2 G/DL (ref 3.5–5.2)
ALP SERPL-CCNC: 104 U/L (ref 35–104)
ALT SERPL-CCNC: 10 U/L (ref 0–32)
ANION GAP SERPL CALCULATED.3IONS-SCNC: 12 MMOL/L (ref 7–16)
AST SERPL-CCNC: 16 U/L (ref 0–31)
BILIRUB SERPL-MCNC: 0.2 MG/DL (ref 0–1.2)
BNP BLD-MCNC: ABNORMAL PG/ML (ref 0–125)
BUN SERPL-MCNC: 18 MG/DL (ref 6–20)
CALCIUM SERPL-MCNC: 9 MG/DL (ref 8.6–10.2)
CHLORIDE SERPL-SCNC: 98 MMOL/L (ref 98–107)
CO2 SERPL-SCNC: 29 MMOL/L (ref 22–29)
CREAT SERPL-MCNC: 4.6 MG/DL (ref 0.5–1)
ERYTHROCYTE [DISTWIDTH] IN BLOOD BY AUTOMATED COUNT: 17.2 FL (ref 11.5–15)
GLUCOSE SERPL-MCNC: 79 MG/DL (ref 74–99)
GONADOTROPIN, CHORIONIC (HCG) QUANT: 0.9 MIU/ML
HCT VFR BLD AUTO: 24.8 % (ref 34–48)
HGB BLD-MCNC: 8.1 G/DL (ref 11.5–15.5)
MAGNESIUM SERPL-MCNC: 2.2 MG/DL (ref 1.6–2.6)
MCH RBC QN AUTO: 31.8 PG (ref 26–35)
MCHC RBC AUTO-ENTMCNC: 32.7 % (ref 32–34.5)
MCV RBC AUTO: 97.3 FL (ref 80–99.9)
METER GLUCOSE: 197 MG/DL (ref 74–99)
METER GLUCOSE: 221 MG/DL (ref 74–99)
METER GLUCOSE: 224 MG/DL (ref 74–99)
METER GLUCOSE: 99 MG/DL (ref 74–99)
PHOSPHATE SERPL-MCNC: 3.3 MG/DL (ref 2.5–4.5)
PLATELET # BLD AUTO: 182 E9/L (ref 130–450)
PMV BLD AUTO: 9.9 FL (ref 7–12)
POTASSIUM SERPL-SCNC: 3.9 MMOL/L (ref 3.5–5)
PROCALCITONIN: 12.64 NG/ML (ref 0–0.08)
PROT SERPL-MCNC: 7.1 G/DL (ref 6.4–8.3)
RBC # BLD AUTO: 2.55 E12/L (ref 3.5–5.5)
SODIUM SERPL-SCNC: 139 MMOL/L (ref 132–146)
WBC # BLD: 5.6 E9/L (ref 4.5–11.5)

## 2023-06-03 PROCEDURE — 82962 GLUCOSE BLOOD TEST: CPT

## 2023-06-03 PROCEDURE — 80053 COMPREHEN METABOLIC PANEL: CPT

## 2023-06-03 PROCEDURE — S5553 INSULIN LONG ACTING 5 U: HCPCS | Performed by: INTERNAL MEDICINE

## 2023-06-03 PROCEDURE — 84145 PROCALCITONIN (PCT): CPT

## 2023-06-03 PROCEDURE — 2580000003 HC RX 258: Performed by: INTERNAL MEDICINE

## 2023-06-03 PROCEDURE — 6370000000 HC RX 637 (ALT 250 FOR IP): Performed by: NURSE PRACTITIONER

## 2023-06-03 PROCEDURE — 83735 ASSAY OF MAGNESIUM: CPT

## 2023-06-03 PROCEDURE — 6370000000 HC RX 637 (ALT 250 FOR IP): Performed by: INTERNAL MEDICINE

## 2023-06-03 PROCEDURE — 6360000002 HC RX W HCPCS: Performed by: INTERNAL MEDICINE

## 2023-06-03 PROCEDURE — 94664 DEMO&/EVAL PT USE INHALER: CPT

## 2023-06-03 PROCEDURE — 99223 1ST HOSP IP/OBS HIGH 75: CPT | Performed by: INTERNAL MEDICINE

## 2023-06-03 PROCEDURE — 36415 COLL VENOUS BLD VENIPUNCTURE: CPT

## 2023-06-03 PROCEDURE — 2140000000 HC CCU INTERMEDIATE R&B

## 2023-06-03 PROCEDURE — 84100 ASSAY OF PHOSPHORUS: CPT

## 2023-06-03 PROCEDURE — 84702 CHORIONIC GONADOTROPIN TEST: CPT

## 2023-06-03 PROCEDURE — 85027 COMPLETE CBC AUTOMATED: CPT

## 2023-06-03 PROCEDURE — 87040 BLOOD CULTURE FOR BACTERIA: CPT

## 2023-06-03 PROCEDURE — 2700000000 HC OXYGEN THERAPY PER DAY

## 2023-06-03 PROCEDURE — 90935 HEMODIALYSIS ONE EVALUATION: CPT

## 2023-06-03 PROCEDURE — 71046 X-RAY EXAM CHEST 2 VIEWS: CPT

## 2023-06-03 PROCEDURE — 2500000003 HC RX 250 WO HCPCS: Performed by: INTERNAL MEDICINE

## 2023-06-03 PROCEDURE — 83880 ASSAY OF NATRIURETIC PEPTIDE: CPT

## 2023-06-03 PROCEDURE — 94640 AIRWAY INHALATION TREATMENT: CPT

## 2023-06-03 RX ORDER — GUAIFENESIN 400 MG/1
400 TABLET ORAL 3 TIMES DAILY
Status: DISCONTINUED | OUTPATIENT
Start: 2023-06-03 | End: 2023-06-04 | Stop reason: HOSPADM

## 2023-06-03 RX ORDER — IPRATROPIUM BROMIDE AND ALBUTEROL SULFATE 2.5; .5 MG/3ML; MG/3ML
1 SOLUTION RESPIRATORY (INHALATION) 3 TIMES DAILY
Status: DISCONTINUED | OUTPATIENT
Start: 2023-06-03 | End: 2023-06-04 | Stop reason: HOSPADM

## 2023-06-03 RX ORDER — NEPHROCAP 1 MG
1 CAP ORAL DAILY
Status: DISCONTINUED | OUTPATIENT
Start: 2023-06-03 | End: 2023-06-04 | Stop reason: HOSPADM

## 2023-06-03 RX ORDER — CARVEDILOL 25 MG/1
50 TABLET ORAL EVERY MORNING
Status: DISCONTINUED | OUTPATIENT
Start: 2023-06-03 | End: 2023-06-04 | Stop reason: HOSPADM

## 2023-06-03 RX ADMIN — INSULIN GLARGINE-YFGN 16 UNITS: 100 INJECTION, SOLUTION SUBCUTANEOUS at 20:21

## 2023-06-03 RX ADMIN — CLOPIDOGREL BISULFATE 75 MG: 75 TABLET ORAL at 08:38

## 2023-06-03 RX ADMIN — NEPHROCAP 1 MG: 1 CAP ORAL at 08:38

## 2023-06-03 RX ADMIN — INSULIN LISPRO 4 UNITS: 100 INJECTION, SOLUTION INTRAVENOUS; SUBCUTANEOUS at 11:35

## 2023-06-03 RX ADMIN — DOXYCYCLINE 100 MG: 100 INJECTION, POWDER, LYOPHILIZED, FOR SOLUTION INTRAVENOUS at 21:51

## 2023-06-03 RX ADMIN — CALCIUM ACETATE 2668 MG: 667 CAPSULE ORAL at 08:38

## 2023-06-03 RX ADMIN — CEFTRIAXONE SODIUM 1000 MG: 1 INJECTION, POWDER, FOR SOLUTION INTRAMUSCULAR; INTRAVENOUS at 21:50

## 2023-06-03 RX ADMIN — GUAIFENESIN 400 MG: 400 TABLET ORAL at 20:21

## 2023-06-03 RX ADMIN — DOXYCYCLINE 100 MG: 100 INJECTION, POWDER, LYOPHILIZED, FOR SOLUTION INTRAVENOUS at 11:21

## 2023-06-03 RX ADMIN — CARVEDILOL 50 MG: 25 TABLET, FILM COATED ORAL at 08:38

## 2023-06-03 RX ADMIN — IPRATROPIUM BROMIDE AND ALBUTEROL SULFATE 1 DOSE: .5; 2.5 SOLUTION RESPIRATORY (INHALATION) at 21:56

## 2023-06-03 RX ADMIN — HEPARIN SODIUM 5000 UNITS: 10000 INJECTION INTRAVENOUS; SUBCUTANEOUS at 06:38

## 2023-06-03 RX ADMIN — Medication 10 ML: at 08:41

## 2023-06-03 RX ADMIN — FAMOTIDINE 20 MG: 20 TABLET ORAL at 08:38

## 2023-06-03 RX ADMIN — HEPARIN SODIUM 5000 UNITS: 10000 INJECTION INTRAVENOUS; SUBCUTANEOUS at 12:18

## 2023-06-03 RX ADMIN — CALCIUM ACETATE 2668 MG: 667 CAPSULE ORAL at 11:35

## 2023-06-04 ENCOUNTER — APPOINTMENT (OUTPATIENT)
Dept: CT IMAGING | Age: 38
DRG: 193 | End: 2023-06-04
Payer: MEDICARE

## 2023-06-04 VITALS
BODY MASS INDEX: 26.18 KG/M2 | OXYGEN SATURATION: 100 % | DIASTOLIC BLOOD PRESSURE: 80 MMHG | HEIGHT: 59 IN | HEART RATE: 105 BPM | RESPIRATION RATE: 18 BRPM | SYSTOLIC BLOOD PRESSURE: 140 MMHG | WEIGHT: 129.85 LBS | TEMPERATURE: 98 F

## 2023-06-04 LAB
ALBUMIN SERPL-MCNC: 3.2 G/DL (ref 3.5–5.2)
ALP SERPL-CCNC: 105 U/L (ref 35–104)
ALT SERPL-CCNC: 9 U/L (ref 0–32)
ANION GAP SERPL CALCULATED.3IONS-SCNC: 15 MMOL/L (ref 7–16)
AST SERPL-CCNC: 15 U/L (ref 0–31)
BILIRUB SERPL-MCNC: 0.2 MG/DL (ref 0–1.2)
BNP BLD-MCNC: ABNORMAL PG/ML (ref 0–125)
BUN SERPL-MCNC: 16 MG/DL (ref 6–20)
CALCIUM SERPL-MCNC: 8.9 MG/DL (ref 8.6–10.2)
CHLORIDE SERPL-SCNC: 93 MMOL/L (ref 98–107)
CO2 SERPL-SCNC: 24 MMOL/L (ref 22–29)
CREAT SERPL-MCNC: 4.1 MG/DL (ref 0.5–1)
CRP SERPL HS-MCNC: 2 MG/DL (ref 0–0.4)
ERYTHROCYTE [DISTWIDTH] IN BLOOD BY AUTOMATED COUNT: 16.8 FL (ref 11.5–15)
GLUCOSE SERPL-MCNC: 349 MG/DL (ref 74–99)
HCT VFR BLD AUTO: 25.6 % (ref 34–48)
HGB BLD-MCNC: 8.3 G/DL (ref 11.5–15.5)
MAGNESIUM SERPL-MCNC: 1.9 MG/DL (ref 1.6–2.6)
MCH RBC QN AUTO: 31.4 PG (ref 26–35)
MCHC RBC AUTO-ENTMCNC: 32.4 % (ref 32–34.5)
MCV RBC AUTO: 97 FL (ref 80–99.9)
METER GLUCOSE: 135 MG/DL (ref 74–99)
METER GLUCOSE: 354 MG/DL (ref 74–99)
PHOSPHATE SERPL-MCNC: 4.1 MG/DL (ref 2.5–4.5)
PLATELET # BLD AUTO: 204 E9/L (ref 130–450)
PMV BLD AUTO: 10.2 FL (ref 7–12)
POTASSIUM SERPL-SCNC: 4.1 MMOL/L (ref 3.5–5)
PROCALCITONIN: 8.57 NG/ML (ref 0–0.08)
PROT SERPL-MCNC: 7.6 G/DL (ref 6.4–8.3)
RBC # BLD AUTO: 2.64 E12/L (ref 3.5–5.5)
SODIUM SERPL-SCNC: 132 MMOL/L (ref 132–146)
WBC # BLD: 5 E9/L (ref 4.5–11.5)

## 2023-06-04 PROCEDURE — 86140 C-REACTIVE PROTEIN: CPT

## 2023-06-04 PROCEDURE — 80053 COMPREHEN METABOLIC PANEL: CPT

## 2023-06-04 PROCEDURE — 84145 PROCALCITONIN (PCT): CPT

## 2023-06-04 PROCEDURE — 6370000000 HC RX 637 (ALT 250 FOR IP): Performed by: INTERNAL MEDICINE

## 2023-06-04 PROCEDURE — 94640 AIRWAY INHALATION TREATMENT: CPT

## 2023-06-04 PROCEDURE — 84100 ASSAY OF PHOSPHORUS: CPT

## 2023-06-04 PROCEDURE — 2500000003 HC RX 250 WO HCPCS: Performed by: INTERNAL MEDICINE

## 2023-06-04 PROCEDURE — 2580000003 HC RX 258: Performed by: INTERNAL MEDICINE

## 2023-06-04 PROCEDURE — 83880 ASSAY OF NATRIURETIC PEPTIDE: CPT

## 2023-06-04 PROCEDURE — 6360000002 HC RX W HCPCS: Performed by: INTERNAL MEDICINE

## 2023-06-04 PROCEDURE — 83735 ASSAY OF MAGNESIUM: CPT

## 2023-06-04 PROCEDURE — 99232 SBSQ HOSP IP/OBS MODERATE 35: CPT | Performed by: INTERNAL MEDICINE

## 2023-06-04 PROCEDURE — 71250 CT THORAX DX C-: CPT

## 2023-06-04 PROCEDURE — 6370000000 HC RX 637 (ALT 250 FOR IP): Performed by: NURSE PRACTITIONER

## 2023-06-04 PROCEDURE — 82962 GLUCOSE BLOOD TEST: CPT

## 2023-06-04 PROCEDURE — 85027 COMPLETE CBC AUTOMATED: CPT

## 2023-06-04 RX ORDER — INSULIN GLARGINE-YFGN 100 [IU]/ML
20 INJECTION, SOLUTION SUBCUTANEOUS NIGHTLY
Status: DISCONTINUED | OUTPATIENT
Start: 2023-06-04 | End: 2023-06-04 | Stop reason: HOSPADM

## 2023-06-04 RX ADMIN — CARVEDILOL 50 MG: 25 TABLET, FILM COATED ORAL at 08:50

## 2023-06-04 RX ADMIN — GUAIFENESIN 400 MG: 400 TABLET ORAL at 16:25

## 2023-06-04 RX ADMIN — Medication 10 ML: at 08:51

## 2023-06-04 RX ADMIN — FAMOTIDINE 20 MG: 20 TABLET ORAL at 08:53

## 2023-06-04 RX ADMIN — NEPHROCAP 1 MG: 1 CAP ORAL at 08:50

## 2023-06-04 RX ADMIN — DOXYCYCLINE 100 MG: 100 INJECTION, POWDER, LYOPHILIZED, FOR SOLUTION INTRAVENOUS at 09:00

## 2023-06-04 RX ADMIN — ONDANSETRON 4 MG: 2 INJECTION INTRAMUSCULAR; INTRAVENOUS at 16:05

## 2023-06-04 RX ADMIN — IPRATROPIUM BROMIDE AND ALBUTEROL SULFATE 1 DOSE: .5; 2.5 SOLUTION RESPIRATORY (INHALATION) at 09:25

## 2023-06-04 RX ADMIN — CLOPIDOGREL BISULFATE 75 MG: 75 TABLET ORAL at 08:50

## 2023-06-04 RX ADMIN — GUAIFENESIN 400 MG: 400 TABLET ORAL at 08:50

## 2023-06-04 RX ADMIN — IPRATROPIUM BROMIDE AND ALBUTEROL SULFATE 1 DOSE: .5; 2.5 SOLUTION RESPIRATORY (INHALATION) at 12:19

## 2023-06-04 RX ADMIN — HEPARIN SODIUM 5000 UNITS: 10000 INJECTION INTRAVENOUS; SUBCUTANEOUS at 06:37

## 2023-06-04 RX ADMIN — CALCIUM ACETATE 2668 MG: 667 CAPSULE ORAL at 16:25

## 2023-06-04 RX ADMIN — INSULIN LISPRO 16 UNITS: 100 INJECTION, SOLUTION INTRAVENOUS; SUBCUTANEOUS at 09:00

## 2023-06-04 RX ADMIN — CALCIUM ACETATE 2668 MG: 667 CAPSULE ORAL at 08:50

## 2023-06-05 ENCOUNTER — CARE COORDINATION (OUTPATIENT)
Dept: CASE MANAGEMENT | Age: 38
End: 2023-06-05

## 2023-06-05 NOTE — CARE COORDINATION
[facility][1] Care Transitions Initial Follow Up Call    Call within 2 business days of discharge: Yes    Care Transition Nurse attempted initial CT outreach leaving Hipaa VM w/ my contact info and reminder to schedule appts. Patient: Yuliana Shoulder Patient : 1985   MRN: <[id number]>  Reason for Admission: 2023 - 2023 [address]. Acute on chronic hypoxic respiratory failure. CXR with patchy bilateral perihilar and lower lobe airspace disease. Discharge Date: 23 RARS: Readmission Risk Score: 22.4  Readmit  CT    [facility] for dialysis T-Th-Sat    PCP  11:00  Needs fu Pulm//V [place]. No changes were made to your medications. PMH: Smoker, CHF, ESRD on HD, HTN, DVT, DM, Covid, Hyperparathyroidism. Last Discharge [address]       Date Complaint Diagnosis Description Type Department Provider    23 Shortness of Breath Dyspnea and respiratory abnormalities . .. ED to Hosp-Admission (Discharged) (ADMITTED) [facility] 6WE Marco Joseph MD; Gely aDniel. ..             Christina Travis RN

## 2023-06-06 ENCOUNTER — CARE COORDINATION (OUTPATIENT)
Dept: CASE MANAGEMENT | Age: 38
End: 2023-06-06

## 2023-06-06 NOTE — CARE COORDINATION
[facility] [address] Transitions Follow Up Call    2023    Patient: Letty Veloz  Patient : 1985   MRN: <[id number]>  Reason for Admission: acute on chronic hypoxic respiratory failure. Discharge Date: 23 RARS: Readmission Risk Score: 22.4         Spoke with: brunilda    [facility] attempted outreach for care transition follow up call. Left HIPPA compliant message and contact information for call back. Care Transitions Subsequent and Final Call    Subsequent and Final Calls  Care Transitions Interventions  Other Interventions:              Follow Up  Future Appointments   Date Time Provider Titus Potter   [facility]23 11[facility]   2023 11:15 AM Kimi Martinez MD [facility]   2023 10:45 AM Amaya Dash MD [facility]       Heather Bashir LPN

## 2023-06-08 LAB
BACTERIA BLD CULT ORG #2: NORMAL
BACTERIA BLD CULT: NORMAL

## 2023-06-22 RX ORDER — PSEUDOEPHED/ACETAMINOPH/DIPHEN 30MG-500MG
TABLET ORAL
Qty: 120 TABLET | Refills: 0 | Status: SHIPPED | OUTPATIENT
Start: 2023-06-22

## 2023-06-22 NOTE — TELEPHONE ENCOUNTER
Last Appointment:  3/31/2023  Future Appointments   Date Time Provider Titus Potter   6/26/2023  2:45 PM Holly Pacheco [place] [address]   6/30/2023 11:00 AM Holly Pacheco [facility]   8/16/2023 11:15 AM Michael Baron MD [facility]   11/6/2023 10:45 AM Oneida Lopez MD [facility]

## 2023-06-26 RX ORDER — INSULIN LISPRO 100 [IU]/ML
INJECTION, SOLUTION INTRAVENOUS; SUBCUTANEOUS
Qty: 15 ML | Refills: 3 | Status: ON HOLD | OUTPATIENT
Start: 2023-06-26

## 2023-06-29 ENCOUNTER — TELEPHONE (OUTPATIENT)
Dept: OTHER | Facility: CLINIC | Age: 38
End: 2023-06-29

## 2023-06-29 ENCOUNTER — APPOINTMENT (OUTPATIENT)
Dept: GENERAL RADIOLOGY | Age: 38
DRG: 640 | End: 2023-06-29
Payer: MEDICARE

## 2023-06-29 ENCOUNTER — HOSPITAL ENCOUNTER (INPATIENT)
Age: 38
LOS: 1 days | Discharge: HOME | DRG: 640 | End: 2023-07-04
Attending: EMERGENCY MEDICINE | Admitting: FAMILY MEDICINE
Payer: MEDICARE

## 2023-06-29 DIAGNOSIS — R06.03 ACUTE RESPIRATORY DISTRESS: Primary | ICD-10-CM

## 2023-06-29 DIAGNOSIS — R06.02 SHORTNESS OF BREATH: ICD-10-CM

## 2023-06-29 DIAGNOSIS — Z91.158 NON-COMPLIANCE WITH RENAL DIALYSIS: ICD-10-CM

## 2023-06-29 DIAGNOSIS — J81.0 ACUTE PULMONARY EDEMA (HCC): ICD-10-CM

## 2023-06-29 LAB
ALBUMIN SERPL-MCNC: 3.8 G/DL (ref 3.5–5.2)
ALP SERPL-CCNC: 102 U/L (ref 35–104)
ALT SERPL-CCNC: 21 U/L (ref 0–32)
ANION GAP SERPL CALCULATED.3IONS-SCNC: 18 MMOL/L (ref 7–16)
AST SERPL-CCNC: 22 U/L (ref 0–31)
BASOPHILS # BLD: 0.02 E9/L (ref 0–0.2)
BASOPHILS NFR BLD: 0.4 % (ref 0–2)
BILIRUB SERPL-MCNC: 0.4 MG/DL (ref 0–1.2)
BUN SERPL-MCNC: 89 MG/DL (ref 6–20)
CALCIUM SERPL-MCNC: 9.3 MG/DL (ref 8.6–10.2)
CHLORIDE SERPL-SCNC: 96 MMOL/L (ref 98–107)
CO2 SERPL-SCNC: 25 MMOL/L (ref 22–29)
CREAT SERPL-MCNC: 15.7 MG/DL (ref 0.5–1)
EOSINOPHIL # BLD: 0.28 E9/L (ref 0.05–0.5)
EOSINOPHIL NFR BLD: 5 % (ref 0–6)
ERYTHROCYTE [DISTWIDTH] IN BLOOD BY AUTOMATED COUNT: 18.6 FL (ref 11.5–15)
GLUCOSE SERPL-MCNC: 124 MG/DL (ref 74–99)
HCT VFR BLD AUTO: 22.6 % (ref 34–48)
HGB BLD-MCNC: 7.3 G/DL (ref 11.5–15.5)
IMM GRANULOCYTES # BLD: 0.02 E9/L
IMM GRANULOCYTES NFR BLD: 0.4 % (ref 0–5)
LACTATE BLDV-SCNC: 1.9 MMOL/L (ref 0.5–2.2)
LIPASE: 18 U/L (ref 13–60)
LYMPHOCYTES # BLD: 1.11 E9/L (ref 1.5–4)
LYMPHOCYTES NFR BLD: 19.8 % (ref 20–42)
MCH RBC QN AUTO: 33.6 PG (ref 26–35)
MCHC RBC AUTO-ENTMCNC: 32.3 % (ref 32–34.5)
MCV RBC AUTO: 104.1 FL (ref 80–99.9)
METER GLUCOSE: 106 MG/DL (ref 74–99)
METER GLUCOSE: 150 MG/DL (ref 74–99)
METER GLUCOSE: 156 MG/DL (ref 74–99)
METER GLUCOSE: 183 MG/DL (ref 74–99)
METER GLUCOSE: 42 MG/DL (ref 74–99)
METER GLUCOSE: 45 MG/DL (ref 74–99)
MONOCYTES # BLD: 0.5 E9/L (ref 0.1–0.95)
MONOCYTES NFR BLD: 8.9 % (ref 2–12)
NEUTROPHILS # BLD: 3.67 E9/L (ref 1.8–7.3)
NEUTS SEG NFR BLD: 65.5 % (ref 43–80)
PLATELET # BLD AUTO: 242 E9/L (ref 130–450)
PMV BLD AUTO: 9.3 FL (ref 7–12)
POTASSIUM SERPL-SCNC: 4.1 MMOL/L (ref 3.5–5)
PROT SERPL-MCNC: 7.8 G/DL (ref 6.4–8.3)
RBC # BLD AUTO: 2.17 E12/L (ref 3.5–5.5)
SODIUM SERPL-SCNC: 139 MMOL/L (ref 132–146)
TROPONIN, HIGH SENSITIVITY: 166 NG/L (ref 0–9)
TROPONIN, HIGH SENSITIVITY: 168 NG/L (ref 0–9)
WBC # BLD: 5.6 E9/L (ref 4.5–11.5)

## 2023-06-29 PROCEDURE — 6360000002 HC RX W HCPCS: Performed by: EMERGENCY MEDICINE

## 2023-06-29 PROCEDURE — 99285 EMERGENCY DEPT VISIT HI MDM: CPT

## 2023-06-29 PROCEDURE — 36415 COLL VENOUS BLD VENIPUNCTURE: CPT

## 2023-06-29 PROCEDURE — 96376 TX/PRO/DX INJ SAME DRUG ADON: CPT

## 2023-06-29 PROCEDURE — 5A1D70Z PERFORMANCE OF URINARY FILTRATION, INTERMITTENT, LESS THAN 6 HOURS PER DAY: ICD-10-PCS | Performed by: STUDENT IN AN ORGANIZED HEALTH CARE EDUCATION/TRAINING PROGRAM

## 2023-06-29 PROCEDURE — 84484 ASSAY OF TROPONIN QUANT: CPT

## 2023-06-29 PROCEDURE — 85025 COMPLETE CBC W/AUTO DIFF WBC: CPT

## 2023-06-29 PROCEDURE — 80053 COMPREHEN METABOLIC PANEL: CPT

## 2023-06-29 PROCEDURE — 83690 ASSAY OF LIPASE: CPT

## 2023-06-29 PROCEDURE — 96375 TX/PRO/DX INJ NEW DRUG ADDON: CPT

## 2023-06-29 PROCEDURE — 83605 ASSAY OF LACTIC ACID: CPT

## 2023-06-29 PROCEDURE — 2500000003 HC RX 250 WO HCPCS: Performed by: EMERGENCY MEDICINE

## 2023-06-29 PROCEDURE — G0378 HOSPITAL OBSERVATION PER HR: HCPCS

## 2023-06-29 PROCEDURE — 71045 X-RAY EXAM CHEST 1 VIEW: CPT

## 2023-06-29 PROCEDURE — 93005 ELECTROCARDIOGRAM TRACING: CPT | Performed by: STUDENT IN AN ORGANIZED HEALTH CARE EDUCATION/TRAINING PROGRAM

## 2023-06-29 PROCEDURE — 96372 THER/PROPH/DIAG INJ SC/IM: CPT

## 2023-06-29 PROCEDURE — 82962 GLUCOSE BLOOD TEST: CPT

## 2023-06-29 RX ORDER — CARVEDILOL 25 MG/1
25 TABLET ORAL NIGHTLY
Status: DISCONTINUED | OUTPATIENT
Start: 2023-06-29 | End: 2023-07-04 | Stop reason: HOSPADM

## 2023-06-29 RX ORDER — ONDANSETRON 2 MG/ML
4 INJECTION INTRAMUSCULAR; INTRAVENOUS EVERY 6 HOURS PRN
Status: DISCONTINUED | OUTPATIENT
Start: 2023-06-29 | End: 2023-07-04 | Stop reason: HOSPADM

## 2023-06-29 RX ORDER — SODIUM CHLORIDE 0.9 % (FLUSH) 0.9 %
10 SYRINGE (ML) INJECTION PRN
Status: DISCONTINUED | OUTPATIENT
Start: 2023-06-29 | End: 2023-07-04 | Stop reason: HOSPADM

## 2023-06-29 RX ORDER — CALCIUM ACETATE 667 MG/1
4 CAPSULE ORAL
Status: DISCONTINUED | OUTPATIENT
Start: 2023-06-30 | End: 2023-07-04 | Stop reason: HOSPADM

## 2023-06-29 RX ORDER — PROMETHAZINE HYDROCHLORIDE 12.5 MG/1
12.5 TABLET ORAL EVERY 6 HOURS PRN
Status: DISCONTINUED | OUTPATIENT
Start: 2023-06-29 | End: 2023-07-04 | Stop reason: HOSPADM

## 2023-06-29 RX ORDER — SODIUM CHLORIDE 9 MG/ML
INJECTION, SOLUTION INTRAVENOUS PRN
Status: DISCONTINUED | OUTPATIENT
Start: 2023-06-29 | End: 2023-07-04 | Stop reason: HOSPADM

## 2023-06-29 RX ORDER — ACETAMINOPHEN 325 MG/1
650 TABLET ORAL EVERY 6 HOURS PRN
Status: DISCONTINUED | OUTPATIENT
Start: 2023-06-29 | End: 2023-07-04 | Stop reason: HOSPADM

## 2023-06-29 RX ORDER — SODIUM CHLORIDE 0.9 % (FLUSH) 0.9 %
10 SYRINGE (ML) INJECTION EVERY 12 HOURS SCHEDULED
Status: DISCONTINUED | OUTPATIENT
Start: 2023-06-29 | End: 2023-07-04 | Stop reason: HOSPADM

## 2023-06-29 RX ORDER — CLOPIDOGREL BISULFATE 75 MG/1
75 TABLET ORAL DAILY
Status: DISCONTINUED | OUTPATIENT
Start: 2023-06-30 | End: 2023-07-04 | Stop reason: HOSPADM

## 2023-06-29 RX ORDER — POLYETHYLENE GLYCOL 3350 17 G/17G
17 POWDER, FOR SOLUTION ORAL DAILY PRN
Status: DISCONTINUED | OUTPATIENT
Start: 2023-06-29 | End: 2023-07-04 | Stop reason: HOSPADM

## 2023-06-29 RX ORDER — INSULIN GLARGINE-YFGN 100 [IU]/ML
16 INJECTION, SOLUTION SUBCUTANEOUS NIGHTLY
Status: DISCONTINUED | OUTPATIENT
Start: 2023-06-29 | End: 2023-07-03

## 2023-06-29 RX ORDER — FAMOTIDINE 20 MG/1
20 TABLET, FILM COATED ORAL EVERY MORNING
Status: DISCONTINUED | OUTPATIENT
Start: 2023-06-30 | End: 2023-07-04 | Stop reason: HOSPADM

## 2023-06-29 RX ORDER — ACETAMINOPHEN 650 MG/1
650 SUPPOSITORY RECTAL EVERY 6 HOURS PRN
Status: DISCONTINUED | OUTPATIENT
Start: 2023-06-29 | End: 2023-07-04 | Stop reason: HOSPADM

## 2023-06-29 RX ORDER — DEXTROSE MONOHYDRATE 25 G/50ML
25 INJECTION, SOLUTION INTRAVENOUS PRN
Status: DISCONTINUED | OUTPATIENT
Start: 2023-06-29 | End: 2023-06-29

## 2023-06-29 RX ORDER — ALBUTEROL SULFATE 2.5 MG/3ML
2.5 SOLUTION RESPIRATORY (INHALATION) EVERY 4 HOURS PRN
Status: DISCONTINUED | OUTPATIENT
Start: 2023-06-29 | End: 2023-07-04 | Stop reason: HOSPADM

## 2023-06-29 RX ORDER — CARVEDILOL 25 MG/1
50 TABLET ORAL EVERY MORNING
Status: DISCONTINUED | OUTPATIENT
Start: 2023-06-30 | End: 2023-07-04 | Stop reason: HOSPADM

## 2023-06-29 RX ORDER — NEPHROCAP 1 MG
1 CAP ORAL DAILY
Status: DISCONTINUED | OUTPATIENT
Start: 2023-06-30 | End: 2023-07-04 | Stop reason: HOSPADM

## 2023-06-29 RX ORDER — INSULIN LISPRO 100 [IU]/ML
10 INJECTION, SOLUTION INTRAVENOUS; SUBCUTANEOUS
Status: DISCONTINUED | OUTPATIENT
Start: 2023-06-30 | End: 2023-07-02

## 2023-06-29 RX ADMIN — DEXTROSE MONOHYDRATE 25 G: 25 INJECTION, SOLUTION INTRAVENOUS at 21:07

## 2023-06-29 RX ADMIN — GLUCAGON 1 MG: KIT at 22:07

## 2023-06-29 RX ADMIN — DEXTROSE MONOHYDRATE 25 G: 25 INJECTION, SOLUTION INTRAVENOUS at 22:01

## 2023-06-30 LAB
AADO2: 189.2 MMHG
AADO2: 200.3 MMHG
ALBUMIN SERPL-MCNC: 3.7 G/DL (ref 3.5–5.2)
ALP SERPL-CCNC: 101 U/L (ref 35–104)
ALT SERPL-CCNC: 21 U/L (ref 0–32)
ANION GAP SERPL CALCULATED.3IONS-SCNC: 19 MMOL/L (ref 7–16)
AST SERPL-CCNC: 26 U/L (ref 0–31)
B.E.: -3.7 MMOL/L (ref -3–3)
B.E.: -4.1 MMOL/L (ref -3–3)
BASOPHILS # BLD: 0.02 E9/L (ref 0–0.2)
BASOPHILS NFR BLD: 0.4 % (ref 0–2)
BILIRUB SERPL-MCNC: 0.4 MG/DL (ref 0–1.2)
BUN SERPL-MCNC: 72 MG/DL (ref 6–20)
CALCIUM SERPL-MCNC: 8.7 MG/DL (ref 8.6–10.2)
CHLORIDE SERPL-SCNC: 95 MMOL/L (ref 98–107)
CO2 SERPL-SCNC: 23 MMOL/L (ref 22–29)
COHB: 0.6 % (ref 0–1.5)
COHB: 0.7 % (ref 0–1.5)
CREAT SERPL-MCNC: 12.6 MG/DL (ref 0.5–1)
CRITICAL: ABNORMAL
CRITICAL: ABNORMAL
DATE ANALYZED: ABNORMAL
DATE ANALYZED: ABNORMAL
DATE OF COLLECTION: ABNORMAL
DATE OF COLLECTION: ABNORMAL
EKG ATRIAL RATE: 110 BPM
EKG P AXIS: 48 DEGREES
EKG P-R INTERVAL: 118 MS
EKG Q-T INTERVAL: 322 MS
EKG QRS DURATION: 70 MS
EKG QTC CALCULATION (BAZETT): 435 MS
EKG R AXIS: 17 DEGREES
EKG T AXIS: 117 DEGREES
EKG VENTRICULAR RATE: 110 BPM
EOSINOPHIL # BLD: 0.2 E9/L (ref 0.05–0.5)
EOSINOPHIL NFR BLD: 4.2 % (ref 0–6)
ERYTHROCYTE [DISTWIDTH] IN BLOOD BY AUTOMATED COUNT: 18.8 FL (ref 11.5–15)
FIO2: 50 %
FIO2: 50 %
GLUCOSE SERPL-MCNC: 205 MG/DL (ref 74–99)
HCO3: 21.6 MMOL/L (ref 22–26)
HCO3: 21.8 MMOL/L (ref 22–26)
HCT VFR BLD AUTO: 22.8 % (ref 34–48)
HGB BLD-MCNC: 7.3 G/DL (ref 11.5–15.5)
HHB: 2.7 % (ref 0–5)
HHB: 3.8 % (ref 0–5)
IMM GRANULOCYTES # BLD: 0.02 E9/L
IMM GRANULOCYTES NFR BLD: 0.4 % (ref 0–5)
LAB: ABNORMAL
LAB: ABNORMAL
LV EF: 63 %
LVEF MODALITY: NORMAL
LYMPHOCYTES # BLD: 0.96 E9/L (ref 1.5–4)
LYMPHOCYTES NFR BLD: 20.3 % (ref 20–42)
Lab: ABNORMAL
Lab: ABNORMAL
MCH RBC QN AUTO: 33.3 PG (ref 26–35)
MCHC RBC AUTO-ENTMCNC: 32 % (ref 32–34.5)
MCV RBC AUTO: 104.1 FL (ref 80–99.9)
METER GLUCOSE: 192 MG/DL (ref 74–99)
METER GLUCOSE: 221 MG/DL (ref 74–99)
METER GLUCOSE: 236 MG/DL (ref 74–99)
METER GLUCOSE: 250 MG/DL (ref 74–99)
METER GLUCOSE: 276 MG/DL (ref 74–99)
METER GLUCOSE: 286 MG/DL (ref 74–99)
METER GLUCOSE: 290 MG/DL (ref 74–99)
METHB: 0.3 % (ref 0–1.5)
METHB: 0.5 % (ref 0–1.5)
MODE: ABNORMAL
MODE: ABNORMAL
MONOCYTES # BLD: 0.14 E9/L (ref 0.1–0.95)
MONOCYTES NFR BLD: 3 % (ref 2–12)
NEUTROPHILS # BLD: 3.4 E9/L (ref 1.8–7.3)
NEUTS SEG NFR BLD: 71.7 % (ref 43–80)
O2 CONTENT: 11 ML/DL
O2 CONTENT: 12 ML/DL
O2 SATURATION: 96.2 % (ref 92–98.5)
O2 SATURATION: 97.3 % (ref 92–98.5)
O2HB: 95.2 % (ref 94–97)
O2HB: 96.2 % (ref 94–97)
OPERATOR ID: 7296
OPERATOR ID: 7296
PATIENT TEMP: 37 C
PATIENT TEMP: 37 C
PCO2: 41.6 MMHG (ref 35–45)
PCO2: 42.1 MMHG (ref 35–45)
PEEP/CPAP: 8 CMH2O
PEEP/CPAP: 8 CMH2O
PFO2: 1.93 MMHG/%
PFO2: 2.16 MMHG/%
PH BLOOD GAS: 7.33 (ref 7.35–7.45)
PH BLOOD GAS: 7.34 (ref 7.35–7.45)
PIP: 15 CMH2O
PIP: 15 CMH2O
PLATELET # BLD AUTO: 246 E9/L (ref 130–450)
PMV BLD AUTO: 9.5 FL (ref 7–12)
PO2: 108 MMHG (ref 75–100)
PO2: 96.3 MMHG (ref 75–100)
POTASSIUM SERPL-SCNC: 5.2 MMOL/L (ref 3.5–5)
PROT SERPL-MCNC: 7.6 G/DL (ref 6.4–8.3)
RBC # BLD AUTO: 2.19 E12/L (ref 3.5–5.5)
RI(T): 1.75
RI(T): 2.08
SODIUM SERPL-SCNC: 137 MMOL/L (ref 132–146)
SOURCE, BLOOD GAS: ABNORMAL
SOURCE, BLOOD GAS: ABNORMAL
THB: 8.1 G/DL (ref 11.5–16.5)
THB: 8.7 G/DL (ref 11.5–16.5)
TIME ANALYZED: 14
TIME ANALYZED: 305
WBC # BLD: 4.7 E9/L (ref 4.5–11.5)

## 2023-06-30 PROCEDURE — 6370000000 HC RX 637 (ALT 250 FOR IP): Performed by: FAMILY MEDICINE

## 2023-06-30 PROCEDURE — G0378 HOSPITAL OBSERVATION PER HR: HCPCS

## 2023-06-30 PROCEDURE — 2580000003 HC RX 258: Performed by: FAMILY MEDICINE

## 2023-06-30 PROCEDURE — S5553 INSULIN LONG ACTING 5 U: HCPCS | Performed by: FAMILY MEDICINE

## 2023-06-30 PROCEDURE — 80053 COMPREHEN METABOLIC PANEL: CPT

## 2023-06-30 PROCEDURE — 2700000000 HC OXYGEN THERAPY PER DAY

## 2023-06-30 PROCEDURE — 82805 BLOOD GASES W/O2 SATURATION: CPT

## 2023-06-30 PROCEDURE — 96365 THER/PROPH/DIAG IV INF INIT: CPT

## 2023-06-30 PROCEDURE — 93010 ELECTROCARDIOGRAM REPORT: CPT | Performed by: INTERNAL MEDICINE

## 2023-06-30 PROCEDURE — 85025 COMPLETE CBC W/AUTO DIFF WBC: CPT

## 2023-06-30 PROCEDURE — 2500000003 HC RX 250 WO HCPCS: Performed by: FAMILY MEDICINE

## 2023-06-30 PROCEDURE — 82962 GLUCOSE BLOOD TEST: CPT

## 2023-06-30 PROCEDURE — 94660 CPAP INITIATION&MGMT: CPT

## 2023-06-30 PROCEDURE — 90935 HEMODIALYSIS ONE EVALUATION: CPT

## 2023-06-30 PROCEDURE — P9047 ALBUMIN (HUMAN), 25%, 50ML: HCPCS | Performed by: INTERNAL MEDICINE

## 2023-06-30 PROCEDURE — 6360000002 HC RX W HCPCS: Performed by: INTERNAL MEDICINE

## 2023-06-30 PROCEDURE — 93306 TTE W/DOPPLER COMPLETE: CPT

## 2023-06-30 RX ORDER — ALBUMIN (HUMAN) 12.5 G/50ML
50 SOLUTION INTRAVENOUS ONCE
Status: COMPLETED | OUTPATIENT
Start: 2023-06-30 | End: 2023-06-30

## 2023-06-30 RX ORDER — DEXTROSE MONOHYDRATE 100 MG/ML
INJECTION, SOLUTION INTRAVENOUS CONTINUOUS PRN
Status: DISCONTINUED | OUTPATIENT
Start: 2023-06-30 | End: 2023-07-04 | Stop reason: HOSPADM

## 2023-06-30 RX ORDER — INSULIN LISPRO 100 [IU]/ML
0-8 INJECTION, SOLUTION INTRAVENOUS; SUBCUTANEOUS
Status: DISCONTINUED | OUTPATIENT
Start: 2023-06-30 | End: 2023-07-02

## 2023-06-30 RX ORDER — INSULIN LISPRO 100 [IU]/ML
0-4 INJECTION, SOLUTION INTRAVENOUS; SUBCUTANEOUS NIGHTLY
Status: DISCONTINUED | OUTPATIENT
Start: 2023-06-30 | End: 2023-07-02

## 2023-06-30 RX ADMIN — CLOPIDOGREL BISULFATE 75 MG: 75 TABLET ORAL at 10:25

## 2023-06-30 RX ADMIN — INSULIN GLARGINE-YFGN 16 UNITS: 100 INJECTION, SOLUTION SUBCUTANEOUS at 23:01

## 2023-06-30 RX ADMIN — CARVEDILOL 25 MG: 25 TABLET, FILM COATED ORAL at 23:00

## 2023-06-30 RX ADMIN — CALCIUM ACETATE 2668 MG: 667 CAPSULE ORAL at 18:03

## 2023-06-30 RX ADMIN — FAMOTIDINE 20 MG: 20 TABLET, FILM COATED ORAL at 10:25

## 2023-06-30 RX ADMIN — CARVEDILOL 50 MG: 25 TABLET, FILM COATED ORAL at 10:24

## 2023-06-30 RX ADMIN — ALBUMIN (HUMAN) 50 G: 0.25 INJECTION, SOLUTION INTRAVENOUS at 14:52

## 2023-06-30 RX ADMIN — INSULIN LISPRO 4 UNITS: 100 INJECTION, SOLUTION INTRAVENOUS; SUBCUTANEOUS at 18:03

## 2023-06-30 RX ADMIN — NEPHROCAP 1 MG: 1 CAP ORAL at 12:25

## 2023-06-30 RX ADMIN — Medication 10 ML: at 00:00

## 2023-06-30 RX ADMIN — INSULIN LISPRO 4 UNITS: 100 INJECTION, SOLUTION INTRAVENOUS; SUBCUTANEOUS at 12:26

## 2023-06-30 RX ADMIN — INSULIN LISPRO 10 UNITS: 100 INJECTION, SOLUTION INTRAVENOUS; SUBCUTANEOUS at 12:25

## 2023-06-30 RX ADMIN — Medication 10 ML: at 23:03

## 2023-06-30 RX ADMIN — CALCIUM ACETATE 2668 MG: 667 CAPSULE ORAL at 12:08

## 2023-06-30 RX ADMIN — INSULIN LISPRO 10 UNITS: 100 INJECTION, SOLUTION INTRAVENOUS; SUBCUTANEOUS at 18:04

## 2023-07-01 LAB
ABO + RH BLD: NORMAL
ANION GAP SERPL CALCULATED.3IONS-SCNC: 12 MMOL/L (ref 7–16)
BLD GP AB SCN SERPL QL: NORMAL
BLOOD BANK DISPENSE STATUS: NORMAL
BLOOD BANK PRODUCT CODE: NORMAL
BPU ID: NORMAL
BUN SERPL-MCNC: 38 MG/DL (ref 6–20)
CALCIUM SERPL-MCNC: 9.2 MG/DL (ref 8.6–10.2)
CHLORIDE SERPL-SCNC: 97 MMOL/L (ref 98–107)
CO2 SERPL-SCNC: 30 MMOL/L (ref 22–29)
CREAT SERPL-MCNC: 8.4 MG/DL (ref 0.5–1)
DESCRIPTION BLOOD BANK: NORMAL
ERYTHROCYTE [DISTWIDTH] IN BLOOD BY AUTOMATED COUNT: 18.7 FL (ref 11.5–15)
GLUCOSE SERPL-MCNC: 71 MG/DL (ref 74–99)
HCT VFR BLD AUTO: 21.3 % (ref 34–48)
HCT VFR BLD AUTO: 24.5 % (ref 34–48)
HGB BLD-MCNC: 6.7 G/DL (ref 11.5–15.5)
HGB BLD-MCNC: 8.3 G/DL (ref 11.5–15.5)
MAGNESIUM SERPL-MCNC: 2.4 MG/DL (ref 1.6–2.6)
MCH RBC QN AUTO: 33 PG (ref 26–35)
MCHC RBC AUTO-ENTMCNC: 31.5 % (ref 32–34.5)
MCV RBC AUTO: 104.9 FL (ref 80–99.9)
METER GLUCOSE: 136 MG/DL (ref 74–99)
METER GLUCOSE: 214 MG/DL (ref 74–99)
METER GLUCOSE: 249 MG/DL (ref 74–99)
PHOSPHATE SERPL-MCNC: 5.8 MG/DL (ref 2.5–4.5)
PLATELET # BLD AUTO: 215 E9/L (ref 130–450)
PMV BLD AUTO: 9 FL (ref 7–12)
POTASSIUM SERPL-SCNC: 4.4 MMOL/L (ref 3.5–5)
RBC # BLD AUTO: 2.03 E12/L (ref 3.5–5.5)
SODIUM SERPL-SCNC: 139 MMOL/L (ref 132–146)
WBC # BLD: 5.6 E9/L (ref 4.5–11.5)

## 2023-07-01 PROCEDURE — 82962 GLUCOSE BLOOD TEST: CPT

## 2023-07-01 PROCEDURE — 2500000003 HC RX 250 WO HCPCS: Performed by: FAMILY MEDICINE

## 2023-07-01 PROCEDURE — 84100 ASSAY OF PHOSPHORUS: CPT

## 2023-07-01 PROCEDURE — 86923 COMPATIBILITY TEST ELECTRIC: CPT

## 2023-07-01 PROCEDURE — G0378 HOSPITAL OBSERVATION PER HR: HCPCS

## 2023-07-01 PROCEDURE — 6370000000 HC RX 637 (ALT 250 FOR IP): Performed by: INTERNAL MEDICINE

## 2023-07-01 PROCEDURE — 86901 BLOOD TYPING SEROLOGIC RH(D): CPT

## 2023-07-01 PROCEDURE — 94660 CPAP INITIATION&MGMT: CPT

## 2023-07-01 PROCEDURE — 2580000003 HC RX 258: Performed by: FAMILY MEDICINE

## 2023-07-01 PROCEDURE — P9016 RBC LEUKOCYTES REDUCED: HCPCS

## 2023-07-01 PROCEDURE — 36430 TRANSFUSION BLD/BLD COMPNT: CPT

## 2023-07-01 PROCEDURE — 86900 BLOOD TYPING SEROLOGIC ABO: CPT

## 2023-07-01 PROCEDURE — 86850 RBC ANTIBODY SCREEN: CPT

## 2023-07-01 PROCEDURE — 85014 HEMATOCRIT: CPT

## 2023-07-01 PROCEDURE — 85027 COMPLETE CBC AUTOMATED: CPT

## 2023-07-01 PROCEDURE — 2500000003 HC RX 250 WO HCPCS: Performed by: INTERNAL MEDICINE

## 2023-07-01 PROCEDURE — 85018 HEMOGLOBIN: CPT

## 2023-07-01 PROCEDURE — S5553 INSULIN LONG ACTING 5 U: HCPCS | Performed by: FAMILY MEDICINE

## 2023-07-01 PROCEDURE — 90935 HEMODIALYSIS ONE EVALUATION: CPT

## 2023-07-01 PROCEDURE — 36415 COLL VENOUS BLD VENIPUNCTURE: CPT

## 2023-07-01 PROCEDURE — 6370000000 HC RX 637 (ALT 250 FOR IP): Performed by: FAMILY MEDICINE

## 2023-07-01 PROCEDURE — 2700000000 HC OXYGEN THERAPY PER DAY

## 2023-07-01 PROCEDURE — 83735 ASSAY OF MAGNESIUM: CPT

## 2023-07-01 PROCEDURE — 94640 AIRWAY INHALATION TREATMENT: CPT

## 2023-07-01 PROCEDURE — 94664 DEMO&/EVAL PT USE INHALER: CPT

## 2023-07-01 PROCEDURE — 80048 BASIC METABOLIC PNL TOTAL CA: CPT

## 2023-07-01 RX ORDER — IPRATROPIUM BROMIDE AND ALBUTEROL SULFATE 2.5; .5 MG/3ML; MG/3ML
1 SOLUTION RESPIRATORY (INHALATION)
Status: DISCONTINUED | OUTPATIENT
Start: 2023-07-01 | End: 2023-07-04 | Stop reason: HOSPADM

## 2023-07-01 RX ORDER — SODIUM CHLORIDE 9 MG/ML
INJECTION, SOLUTION INTRAVENOUS PRN
Status: DISCONTINUED | OUTPATIENT
Start: 2023-07-01 | End: 2023-07-04 | Stop reason: HOSPADM

## 2023-07-01 RX ORDER — GUAIFENESIN 200 MG/10ML
200 LIQUID ORAL EVERY 4 HOURS PRN
Status: DISCONTINUED | OUTPATIENT
Start: 2023-07-01 | End: 2023-07-04 | Stop reason: HOSPADM

## 2023-07-01 RX ADMIN — GUAIFENESIN 200 MG: 200 SOLUTION ORAL at 22:39

## 2023-07-01 RX ADMIN — INSULIN LISPRO 10 UNITS: 100 INJECTION, SOLUTION INTRAVENOUS; SUBCUTANEOUS at 18:00

## 2023-07-01 RX ADMIN — Medication 10 ML: at 08:19

## 2023-07-01 RX ADMIN — CALCIUM ACETATE 2668 MG: 667 CAPSULE ORAL at 08:18

## 2023-07-01 RX ADMIN — CARVEDILOL 25 MG: 25 TABLET, FILM COATED ORAL at 21:16

## 2023-07-01 RX ADMIN — INSULIN LISPRO 2 UNITS: 100 INJECTION, SOLUTION INTRAVENOUS; SUBCUTANEOUS at 08:17

## 2023-07-01 RX ADMIN — FAMOTIDINE 20 MG: 20 TABLET, FILM COATED ORAL at 08:18

## 2023-07-01 RX ADMIN — CARVEDILOL 50 MG: 25 TABLET, FILM COATED ORAL at 08:18

## 2023-07-01 RX ADMIN — CLOPIDOGREL BISULFATE 75 MG: 75 TABLET ORAL at 08:18

## 2023-07-01 RX ADMIN — INSULIN LISPRO 10 UNITS: 100 INJECTION, SOLUTION INTRAVENOUS; SUBCUTANEOUS at 08:18

## 2023-07-01 RX ADMIN — IPRATROPIUM BROMIDE AND ALBUTEROL SULFATE 1 DOSE: .5; 2.5 SOLUTION RESPIRATORY (INHALATION) at 22:48

## 2023-07-01 RX ADMIN — Medication 10 ML: at 21:16

## 2023-07-01 RX ADMIN — INSULIN LISPRO 2 UNITS: 100 INJECTION, SOLUTION INTRAVENOUS; SUBCUTANEOUS at 18:00

## 2023-07-01 RX ADMIN — CALCIUM ACETATE 2668 MG: 667 CAPSULE ORAL at 18:00

## 2023-07-01 RX ADMIN — NEPHROCAP 1 MG: 1 CAP ORAL at 08:18

## 2023-07-01 RX ADMIN — INSULIN GLARGINE-YFGN 16 UNITS: 100 INJECTION, SOLUTION SUBCUTANEOUS at 21:16

## 2023-07-02 ENCOUNTER — APPOINTMENT (OUTPATIENT)
Dept: CT IMAGING | Age: 38
DRG: 640 | End: 2023-07-02
Attending: INTERNAL MEDICINE
Payer: MEDICARE

## 2023-07-02 ENCOUNTER — APPOINTMENT (OUTPATIENT)
Dept: GENERAL RADIOLOGY | Age: 38
DRG: 640 | End: 2023-07-02
Payer: MEDICARE

## 2023-07-02 LAB
ANION GAP SERPL CALCULATED.3IONS-SCNC: 11 MMOL/L (ref 7–16)
B PARAP IS1001 DNA NPH QL NAA+NON-PROBE: NOT DETECTED
B PERT.PT PRMT NPH QL NAA+NON-PROBE: NOT DETECTED
BUN SERPL-MCNC: 16 MG/DL (ref 6–20)
C PNEUM DNA NPH QL NAA+NON-PROBE: NOT DETECTED
CALCIUM SERPL-MCNC: 9.2 MG/DL (ref 8.6–10.2)
CHLORIDE SERPL-SCNC: 93 MMOL/L (ref 98–107)
CO2 SERPL-SCNC: 31 MMOL/L (ref 22–29)
CREAT SERPL-MCNC: 4.7 MG/DL (ref 0.5–1)
ERYTHROCYTE [DISTWIDTH] IN BLOOD BY AUTOMATED COUNT: 19.9 FL (ref 11.5–15)
FLUAV RNA NPH QL NAA+NON-PROBE: NOT DETECTED
FLUBV RNA NPH QL NAA+NON-PROBE: NOT DETECTED
GLUCOSE SERPL-MCNC: 215 MG/DL (ref 74–99)
GLUCOSE SERPL-MCNC: 231 MG/DL (ref 74–99)
HADV DNA NPH QL NAA+NON-PROBE: NOT DETECTED
HCOV 229E RNA NPH QL NAA+NON-PROBE: NOT DETECTED
HCOV HKU1 RNA NPH QL NAA+NON-PROBE: NOT DETECTED
HCOV NL63 RNA NPH QL NAA+NON-PROBE: NOT DETECTED
HCOV OC43 RNA NPH QL NAA+NON-PROBE: NOT DETECTED
HCT VFR BLD AUTO: 26.2 % (ref 34–48)
HGB BLD-MCNC: 8.4 G/DL (ref 11.5–15.5)
HMPV RNA NPH QL NAA+NON-PROBE: NOT DETECTED
HPIV1 RNA NPH QL NAA+NON-PROBE: NOT DETECTED
HPIV2 RNA NPH QL NAA+NON-PROBE: NOT DETECTED
HPIV3 RNA NPH QL NAA+NON-PROBE: NOT DETECTED
HPIV4 RNA NPH QL NAA+NON-PROBE: NOT DETECTED
M PNEUMO DNA NPH QL NAA+NON-PROBE: NOT DETECTED
MAGNESIUM SERPL-MCNC: 2.1 MG/DL (ref 1.6–2.6)
MCH RBC QN AUTO: 33.5 PG (ref 26–35)
MCHC RBC AUTO-ENTMCNC: 32.1 % (ref 32–34.5)
MCV RBC AUTO: 104.4 FL (ref 80–99.9)
METER GLUCOSE: 127 MG/DL (ref 74–99)
METER GLUCOSE: 177 MG/DL (ref 74–99)
METER GLUCOSE: 216 MG/DL (ref 74–99)
METER GLUCOSE: 218 MG/DL (ref 74–99)
METER GLUCOSE: 229 MG/DL (ref 74–99)
METER GLUCOSE: 259 MG/DL (ref 74–99)
METER GLUCOSE: 268 MG/DL (ref 74–99)
METER GLUCOSE: 343 MG/DL (ref 74–99)
METER GLUCOSE: 57 MG/DL (ref 74–99)
METER GLUCOSE: <40 MG/DL (ref 74–99)
PHOSPHATE SERPL-MCNC: 4.3 MG/DL (ref 2.5–4.5)
PLATELET # BLD AUTO: 248 E9/L (ref 130–450)
PMV BLD AUTO: 9.1 FL (ref 7–12)
POTASSIUM SERPL-SCNC: 5.1 MMOL/L (ref 3.5–5)
PROCALCITONIN: 15.13 NG/ML (ref 0–0.08)
RBC # BLD AUTO: 2.51 E12/L (ref 3.5–5.5)
RSV RNA NPH QL NAA+NON-PROBE: NOT DETECTED
RV+EV RNA NPH QL NAA+NON-PROBE: NOT DETECTED
SARS-COV-2 RNA NPH QL NAA+NON-PROBE: NOT DETECTED
SODIUM SERPL-SCNC: 135 MMOL/L (ref 132–146)
WBC # BLD: 6.1 E9/L (ref 4.5–11.5)

## 2023-07-02 PROCEDURE — 71045 X-RAY EXAM CHEST 1 VIEW: CPT

## 2023-07-02 PROCEDURE — 2700000000 HC OXYGEN THERAPY PER DAY

## 2023-07-02 PROCEDURE — 2500000003 HC RX 250 WO HCPCS: Performed by: FAMILY MEDICINE

## 2023-07-02 PROCEDURE — 0202U NFCT DS 22 TRGT SARS-COV-2: CPT

## 2023-07-02 PROCEDURE — 80048 BASIC METABOLIC PNL TOTAL CA: CPT

## 2023-07-02 PROCEDURE — 2580000003 HC RX 258: Performed by: FAMILY MEDICINE

## 2023-07-02 PROCEDURE — 36415 COLL VENOUS BLD VENIPUNCTURE: CPT

## 2023-07-02 PROCEDURE — 6370000000 HC RX 637 (ALT 250 FOR IP): Performed by: INTERNAL MEDICINE

## 2023-07-02 PROCEDURE — G0378 HOSPITAL OBSERVATION PER HR: HCPCS

## 2023-07-02 PROCEDURE — 83735 ASSAY OF MAGNESIUM: CPT

## 2023-07-02 PROCEDURE — 94660 CPAP INITIATION&MGMT: CPT

## 2023-07-02 PROCEDURE — 2500000003 HC RX 250 WO HCPCS: Performed by: INTERNAL MEDICINE

## 2023-07-02 PROCEDURE — 84100 ASSAY OF PHOSPHORUS: CPT

## 2023-07-02 PROCEDURE — 94640 AIRWAY INHALATION TREATMENT: CPT

## 2023-07-02 PROCEDURE — 84145 PROCALCITONIN (PCT): CPT

## 2023-07-02 PROCEDURE — 71250 CT THORAX DX C-: CPT

## 2023-07-02 PROCEDURE — 82962 GLUCOSE BLOOD TEST: CPT

## 2023-07-02 PROCEDURE — 6370000000 HC RX 637 (ALT 250 FOR IP): Performed by: FAMILY MEDICINE

## 2023-07-02 PROCEDURE — 6360000004 HC RX CONTRAST MEDICATION: Performed by: RADIOLOGY

## 2023-07-02 PROCEDURE — 82947 ASSAY GLUCOSE BLOOD QUANT: CPT

## 2023-07-02 PROCEDURE — 85027 COMPLETE CBC AUTOMATED: CPT

## 2023-07-02 RX ORDER — INSULIN LISPRO 100 [IU]/ML
0-4 INJECTION, SOLUTION INTRAVENOUS; SUBCUTANEOUS NIGHTLY
Status: DISCONTINUED | OUTPATIENT
Start: 2023-07-02 | End: 2023-07-04 | Stop reason: HOSPADM

## 2023-07-02 RX ORDER — INSULIN LISPRO 100 [IU]/ML
6 INJECTION, SOLUTION INTRAVENOUS; SUBCUTANEOUS
Status: DISCONTINUED | OUTPATIENT
Start: 2023-07-02 | End: 2023-07-03

## 2023-07-02 RX ORDER — DOXYCYCLINE HYCLATE 100 MG/1
100 CAPSULE ORAL EVERY 12 HOURS SCHEDULED
Status: DISCONTINUED | OUTPATIENT
Start: 2023-07-02 | End: 2023-07-04 | Stop reason: HOSPADM

## 2023-07-02 RX ORDER — DEXTROSE MONOHYDRATE 100 MG/ML
INJECTION, SOLUTION INTRAVENOUS CONTINUOUS PRN
Status: DISCONTINUED | OUTPATIENT
Start: 2023-07-02 | End: 2023-07-04 | Stop reason: HOSPADM

## 2023-07-02 RX ORDER — INSULIN LISPRO 100 [IU]/ML
0-4 INJECTION, SOLUTION INTRAVENOUS; SUBCUTANEOUS
Status: DISCONTINUED | OUTPATIENT
Start: 2023-07-02 | End: 2023-07-03

## 2023-07-02 RX ORDER — GUAIFENESIN 400 MG/1
400 TABLET ORAL 3 TIMES DAILY
Status: DISCONTINUED | OUTPATIENT
Start: 2023-07-02 | End: 2023-07-04 | Stop reason: HOSPADM

## 2023-07-02 RX ADMIN — CARVEDILOL 50 MG: 25 TABLET, FILM COATED ORAL at 09:44

## 2023-07-02 RX ADMIN — INSULIN LISPRO 10 UNITS: 100 INJECTION, SOLUTION INTRAVENOUS; SUBCUTANEOUS at 09:43

## 2023-07-02 RX ADMIN — SALINE NASAL SPRAY 1 SPRAY: 1.5 SOLUTION NASAL at 20:13

## 2023-07-02 RX ADMIN — CARVEDILOL 25 MG: 25 TABLET, FILM COATED ORAL at 20:08

## 2023-07-02 RX ADMIN — CALCIUM ACETATE 2668 MG: 667 CAPSULE ORAL at 09:44

## 2023-07-02 RX ADMIN — CALCIUM ACETATE 2668 MG: 667 CAPSULE ORAL at 18:55

## 2023-07-02 RX ADMIN — GUAIFENESIN 400 MG: 400 TABLET ORAL at 20:07

## 2023-07-02 RX ADMIN — Medication 10 ML: at 20:08

## 2023-07-02 RX ADMIN — DEXTROSE MONOHYDRATE 125 ML: 100 INJECTION, SOLUTION INTRAVENOUS at 12:21

## 2023-07-02 RX ADMIN — IOPAMIDOL 75 ML: 755 INJECTION, SOLUTION INTRAVENOUS at 18:05

## 2023-07-02 RX ADMIN — INSULIN LISPRO 2 UNITS: 100 INJECTION, SOLUTION INTRAVENOUS; SUBCUTANEOUS at 09:46

## 2023-07-02 RX ADMIN — FAMOTIDINE 20 MG: 20 TABLET, FILM COATED ORAL at 09:44

## 2023-07-02 RX ADMIN — CLOPIDOGREL BISULFATE 75 MG: 75 TABLET ORAL at 09:44

## 2023-07-02 RX ADMIN — IPRATROPIUM BROMIDE AND ALBUTEROL SULFATE 1 DOSE: .5; 2.5 SOLUTION RESPIRATORY (INHALATION) at 14:15

## 2023-07-02 RX ADMIN — DEXTROSE MONOHYDRATE 125 ML: 100 INJECTION, SOLUTION INTRAVENOUS at 13:07

## 2023-07-02 RX ADMIN — SALINE NASAL SPRAY 1 SPRAY: 1.5 SOLUTION NASAL at 09:43

## 2023-07-02 RX ADMIN — Medication 10 ML: at 09:47

## 2023-07-02 RX ADMIN — CALCIUM ACETATE 2668 MG: 667 CAPSULE ORAL at 14:09

## 2023-07-02 RX ADMIN — GUAIFENESIN 200 MG: 200 SOLUTION ORAL at 05:38

## 2023-07-02 RX ADMIN — INSULIN LISPRO 6 UNITS: 100 INJECTION, SOLUTION INTRAVENOUS; SUBCUTANEOUS at 18:55

## 2023-07-02 RX ADMIN — GUAIFENESIN 200 MG: 200 SOLUTION ORAL at 20:08

## 2023-07-02 RX ADMIN — INSULIN LISPRO 3 UNITS: 100 INJECTION, SOLUTION INTRAVENOUS; SUBCUTANEOUS at 18:56

## 2023-07-02 RX ADMIN — IPRATROPIUM BROMIDE AND ALBUTEROL SULFATE 1 DOSE: .5; 2.5 SOLUTION RESPIRATORY (INHALATION) at 19:50

## 2023-07-02 RX ADMIN — IPRATROPIUM BROMIDE AND ALBUTEROL SULFATE 1 DOSE: .5; 2.5 SOLUTION RESPIRATORY (INHALATION) at 08:22

## 2023-07-03 PROBLEM — Z99.2 ACUTE RENAL FAILURE SUPERIMPOSED ON CHRONIC KIDNEY DISEASE, ON CHRONIC DIALYSIS, UNSPECIFIED ACUTE RENAL FAILURE TYPE (HCC): Status: ACTIVE | Noted: 2023-07-03

## 2023-07-03 PROBLEM — N18.6 ACUTE RENAL FAILURE SUPERIMPOSED ON CHRONIC KIDNEY DISEASE, ON CHRONIC DIALYSIS, UNSPECIFIED ACUTE RENAL FAILURE TYPE (HCC): Status: ACTIVE | Noted: 2023-07-03

## 2023-07-03 PROBLEM — N18.9 ACUTE RENAL FAILURE SUPERIMPOSED ON CHRONIC KIDNEY DISEASE, ON CHRONIC DIALYSIS, UNSPECIFIED ACUTE RENAL FAILURE TYPE (HCC): Status: ACTIVE | Noted: 2023-07-03

## 2023-07-03 PROBLEM — R06.03 ACUTE RESPIRATORY DISTRESS: Status: ACTIVE | Noted: 2023-07-03

## 2023-07-03 PROBLEM — N17.9 ACUTE RENAL FAILURE SUPERIMPOSED ON CHRONIC KIDNEY DISEASE, ON CHRONIC DIALYSIS, UNSPECIFIED ACUTE RENAL FAILURE TYPE (HCC): Status: ACTIVE | Noted: 2023-07-03

## 2023-07-03 LAB
ANION GAP SERPL CALCULATED.3IONS-SCNC: 13 MMOL/L (ref 7–16)
BUN SERPL-MCNC: 25 MG/DL (ref 6–20)
CALCIUM SERPL-MCNC: 9.4 MG/DL (ref 8.6–10.2)
CHLORIDE SERPL-SCNC: 89 MMOL/L (ref 98–107)
CO2 SERPL-SCNC: 26 MMOL/L (ref 22–29)
CREAT SERPL-MCNC: 6.4 MG/DL (ref 0.5–1)
ERYTHROCYTE [DISTWIDTH] IN BLOOD BY AUTOMATED COUNT: 18.5 FL (ref 11.5–15)
GLUCOSE SERPL-MCNC: 456 MG/DL (ref 74–99)
HCT VFR BLD AUTO: 26.7 % (ref 34–48)
HGB BLD-MCNC: 8.6 G/DL (ref 11.5–15.5)
MAGNESIUM SERPL-MCNC: 2.2 MG/DL (ref 1.6–2.6)
MCH RBC QN AUTO: 33.2 PG (ref 26–35)
MCHC RBC AUTO-ENTMCNC: 32.2 % (ref 32–34.5)
MCV RBC AUTO: 103.1 FL (ref 80–99.9)
METER GLUCOSE: 264 MG/DL (ref 74–99)
METER GLUCOSE: 289 MG/DL (ref 74–99)
METER GLUCOSE: 399 MG/DL (ref 74–99)
METER GLUCOSE: 441 MG/DL (ref 74–99)
PHOSPHATE SERPL-MCNC: 5.2 MG/DL (ref 2.5–4.5)
PLATELET # BLD AUTO: 206 E9/L (ref 130–450)
PMV BLD AUTO: 9.2 FL (ref 7–12)
POTASSIUM SERPL-SCNC: 5.8 MMOL/L (ref 3.5–5)
PROCALCITONIN: 11.85 NG/ML (ref 0–0.08)
RBC # BLD AUTO: 2.59 E12/L (ref 3.5–5.5)
SODIUM SERPL-SCNC: 128 MMOL/L (ref 132–146)
WBC # BLD: 5.5 E9/L (ref 4.5–11.5)

## 2023-07-03 PROCEDURE — 6370000000 HC RX 637 (ALT 250 FOR IP): Performed by: INTERNAL MEDICINE

## 2023-07-03 PROCEDURE — 85027 COMPLETE CBC AUTOMATED: CPT

## 2023-07-03 PROCEDURE — 2500000003 HC RX 250 WO HCPCS: Performed by: FAMILY MEDICINE

## 2023-07-03 PROCEDURE — 84145 PROCALCITONIN (PCT): CPT

## 2023-07-03 PROCEDURE — 83735 ASSAY OF MAGNESIUM: CPT

## 2023-07-03 PROCEDURE — 36415 COLL VENOUS BLD VENIPUNCTURE: CPT

## 2023-07-03 PROCEDURE — 2580000003 HC RX 258: Performed by: INTERNAL MEDICINE

## 2023-07-03 PROCEDURE — 90935 HEMODIALYSIS ONE EVALUATION: CPT

## 2023-07-03 PROCEDURE — 99222 1ST HOSP IP/OBS MODERATE 55: CPT | Performed by: INTERNAL MEDICINE

## 2023-07-03 PROCEDURE — 87081 CULTURE SCREEN ONLY: CPT

## 2023-07-03 PROCEDURE — 6360000002 HC RX W HCPCS: Performed by: INTERNAL MEDICINE

## 2023-07-03 PROCEDURE — 6370000000 HC RX 637 (ALT 250 FOR IP): Performed by: FAMILY MEDICINE

## 2023-07-03 PROCEDURE — 80048 BASIC METABOLIC PNL TOTAL CA: CPT

## 2023-07-03 PROCEDURE — 2700000000 HC OXYGEN THERAPY PER DAY

## 2023-07-03 PROCEDURE — 6370000000 HC RX 637 (ALT 250 FOR IP)

## 2023-07-03 PROCEDURE — 1200000000 HC SEMI PRIVATE

## 2023-07-03 PROCEDURE — S5553 INSULIN LONG ACTING 5 U: HCPCS | Performed by: INTERNAL MEDICINE

## 2023-07-03 PROCEDURE — G0378 HOSPITAL OBSERVATION PER HR: HCPCS

## 2023-07-03 PROCEDURE — 82962 GLUCOSE BLOOD TEST: CPT

## 2023-07-03 PROCEDURE — 94660 CPAP INITIATION&MGMT: CPT

## 2023-07-03 PROCEDURE — 96366 THER/PROPH/DIAG IV INF ADDON: CPT

## 2023-07-03 PROCEDURE — 94640 AIRWAY INHALATION TREATMENT: CPT

## 2023-07-03 PROCEDURE — S5553 INSULIN LONG ACTING 5 U: HCPCS

## 2023-07-03 PROCEDURE — 2580000003 HC RX 258: Performed by: FAMILY MEDICINE

## 2023-07-03 PROCEDURE — 96367 TX/PROPH/DG ADDL SEQ IV INF: CPT

## 2023-07-03 PROCEDURE — 84100 ASSAY OF PHOSPHORUS: CPT

## 2023-07-03 RX ORDER — INSULIN LISPRO 100 [IU]/ML
0-6 INJECTION, SOLUTION INTRAVENOUS; SUBCUTANEOUS
Status: DISCONTINUED | OUTPATIENT
Start: 2023-07-03 | End: 2023-07-04 | Stop reason: HOSPADM

## 2023-07-03 RX ORDER — INSULIN GLARGINE-YFGN 100 [IU]/ML
10 INJECTION, SOLUTION SUBCUTANEOUS ONCE
Status: COMPLETED | OUTPATIENT
Start: 2023-07-03 | End: 2023-07-03

## 2023-07-03 RX ORDER — INSULIN LISPRO 100 [IU]/ML
5 INJECTION, SOLUTION INTRAVENOUS; SUBCUTANEOUS
Status: DISCONTINUED | OUTPATIENT
Start: 2023-07-04 | End: 2023-07-04 | Stop reason: HOSPADM

## 2023-07-03 RX ORDER — INSULIN GLARGINE-YFGN 100 [IU]/ML
5 INJECTION, SOLUTION SUBCUTANEOUS NIGHTLY
Status: DISCONTINUED | OUTPATIENT
Start: 2023-07-03 | End: 2023-07-04 | Stop reason: HOSPADM

## 2023-07-03 RX ORDER — INSULIN LISPRO 100 [IU]/ML
4 INJECTION, SOLUTION INTRAVENOUS; SUBCUTANEOUS
Status: DISCONTINUED | OUTPATIENT
Start: 2023-07-03 | End: 2023-07-03

## 2023-07-03 RX ORDER — INSULIN GLARGINE-YFGN 100 [IU]/ML
10 INJECTION, SOLUTION SUBCUTANEOUS EVERY MORNING
Status: DISCONTINUED | OUTPATIENT
Start: 2023-07-04 | End: 2023-07-04 | Stop reason: HOSPADM

## 2023-07-03 RX ADMIN — GUAIFENESIN 400 MG: 400 TABLET ORAL at 13:19

## 2023-07-03 RX ADMIN — INSULIN LISPRO 6 UNITS: 100 INJECTION, SOLUTION INTRAVENOUS; SUBCUTANEOUS at 11:03

## 2023-07-03 RX ADMIN — SALINE NASAL SPRAY 1 SPRAY: 1.5 SOLUTION NASAL at 11:02

## 2023-07-03 RX ADMIN — DOXYCYCLINE HYCLATE 100 MG: 100 CAPSULE ORAL at 00:10

## 2023-07-03 RX ADMIN — IPRATROPIUM BROMIDE AND ALBUTEROL SULFATE 1 DOSE: .5; 2.5 SOLUTION RESPIRATORY (INHALATION) at 19:46

## 2023-07-03 RX ADMIN — GUAIFENESIN 400 MG: 400 TABLET ORAL at 21:30

## 2023-07-03 RX ADMIN — CEFEPIME 1000 MG: 1 INJECTION, POWDER, FOR SOLUTION INTRAMUSCULAR; INTRAVENOUS at 21:38

## 2023-07-03 RX ADMIN — DOXYCYCLINE HYCLATE 100 MG: 100 CAPSULE ORAL at 11:01

## 2023-07-03 RX ADMIN — Medication 10 ML: at 11:02

## 2023-07-03 RX ADMIN — CARVEDILOL 25 MG: 25 TABLET, FILM COATED ORAL at 21:30

## 2023-07-03 RX ADMIN — CALCIUM ACETATE 2668 MG: 667 CAPSULE ORAL at 17:42

## 2023-07-03 RX ADMIN — DOXYCYCLINE HYCLATE 100 MG: 100 CAPSULE ORAL at 21:30

## 2023-07-03 RX ADMIN — CEFEPIME 1000 MG: 1 INJECTION, POWDER, FOR SOLUTION INTRAMUSCULAR; INTRAVENOUS at 00:16

## 2023-07-03 RX ADMIN — INSULIN LISPRO 1 UNITS: 100 INJECTION, SOLUTION INTRAVENOUS; SUBCUTANEOUS at 11:03

## 2023-07-03 RX ADMIN — INSULIN GLARGINE-YFGN 5 UNITS: 100 INJECTION, SOLUTION SUBCUTANEOUS at 21:31

## 2023-07-03 RX ADMIN — GUAIFENESIN 400 MG: 400 TABLET ORAL at 11:02

## 2023-07-03 RX ADMIN — Medication 10 ML: at 21:30

## 2023-07-03 RX ADMIN — SALINE NASAL SPRAY 1 SPRAY: 1.5 SOLUTION NASAL at 17:42

## 2023-07-03 RX ADMIN — SALINE NASAL SPRAY 1 SPRAY: 1.5 SOLUTION NASAL at 21:31

## 2023-07-03 RX ADMIN — CARVEDILOL 50 MG: 25 TABLET, FILM COATED ORAL at 11:00

## 2023-07-03 RX ADMIN — IPRATROPIUM BROMIDE AND ALBUTEROL SULFATE 1 DOSE: .5; 2.5 SOLUTION RESPIRATORY (INHALATION) at 12:51

## 2023-07-03 RX ADMIN — INSULIN LISPRO 4 UNITS: 100 INJECTION, SOLUTION INTRAVENOUS; SUBCUTANEOUS at 17:40

## 2023-07-03 RX ADMIN — INSULIN LISPRO 5 UNITS: 100 INJECTION, SOLUTION INTRAVENOUS; SUBCUTANEOUS at 17:41

## 2023-07-03 RX ADMIN — INSULIN GLARGINE-YFGN 10 UNITS: 100 INJECTION, SOLUTION SUBCUTANEOUS at 11:03

## 2023-07-03 RX ADMIN — CALCIUM ACETATE 2668 MG: 667 CAPSULE ORAL at 11:02

## 2023-07-03 RX ADMIN — CLOPIDOGREL BISULFATE 75 MG: 75 TABLET ORAL at 11:01

## 2023-07-03 RX ADMIN — FAMOTIDINE 20 MG: 20 TABLET, FILM COATED ORAL at 11:01

## 2023-07-03 NOTE — PLAN OF CARE
Problem: Safety - Adult  Goal: Free from fall injury  7/3/2023 0053 by Isai Fallon RN  Outcome: Progressing  7/2/2023 1807 by Abril Gabriel RN  Outcome: Progressing  7/2/2023 1807 by Abril Gabriel RN  Outcome: Progressing

## 2023-07-03 NOTE — PLAN OF CARE
Problem: Safety - Adult  Goal: Free from fall injury  7/3/2023 1206 by David Moncada RN  Outcome: Progressing

## 2023-07-03 NOTE — CARE COORDINATION
Met with patient in room to discuss transition of care. She would like [facility] when medically ready. Referral made to [address] at [facility]. Home health care orders are in. Patient is on 5 L O2 at home supplied by [facility], pt is asking to be switched to [facility] DME because she ran out of portable tanks and when she called they said they couldn't deliver. I asked her if I [facility] if that would be okay and she said yes. I spoke with Rina Porras from [facility], he said that they delivered portable tanks to patient [address]. Patient said the portable ran out. iRna Porras informed and also informed she will need a portable tank for discharge. Rina Porras said patient is requesting a portable concentrator for home. Patient will need to call [facility] once discharged home for them to set up testing for portable concentrator. Patient informed and voiced understanding. Patient goes to [facility] on [address] T-TH-SAT chairtime 11 am, and drives. Patient said her Uncle and her daughter will transport her home at time of discharge and will need the portable tank from [facility] that Rina Porras was informed of. Patient is currently on O2 5 liters nasal cannula with O2 sat 98%.   Natalya Jarquin RN CM  [phone]

## 2023-07-04 VITALS
BODY MASS INDEX: 29.73 KG/M2 | OXYGEN SATURATION: 95 % | SYSTOLIC BLOOD PRESSURE: 154 MMHG | RESPIRATION RATE: 18 BRPM | HEIGHT: 59 IN | WEIGHT: 147.49 LBS | DIASTOLIC BLOOD PRESSURE: 76 MMHG | HEART RATE: 92 BPM | TEMPERATURE: 97.5 F

## 2023-07-04 LAB
ANION GAP SERPL CALCULATED.3IONS-SCNC: 11 MMOL/L (ref 7–16)
BUN SERPL-MCNC: 19 MG/DL (ref 6–20)
CALCIUM SERPL-MCNC: 9 MG/DL (ref 8.6–10.2)
CHLORIDE SERPL-SCNC: 93 MMOL/L (ref 98–107)
CO2 SERPL-SCNC: 29 MMOL/L (ref 22–29)
CREAT SERPL-MCNC: 4.7 MG/DL (ref 0.5–1)
ERYTHROCYTE [DISTWIDTH] IN BLOOD BY AUTOMATED COUNT: 17.8 FL (ref 11.5–15)
GLUCOSE SERPL-MCNC: 296 MG/DL (ref 74–99)
HCT VFR BLD AUTO: 25.1 % (ref 34–48)
HGB BLD-MCNC: 8.3 G/DL (ref 11.5–15.5)
IGA SERPL-MCNC: <5 MG/DL (ref 70–400)
IGG SERPL-MCNC: 1949 MG/DL (ref 700–1600)
IGM SERPL-MCNC: 124 MG/DL (ref 40–230)
MAGNESIUM SERPL-MCNC: 2.1 MG/DL (ref 1.6–2.6)
MCH RBC QN AUTO: 33.3 PG (ref 26–35)
MCHC RBC AUTO-ENTMCNC: 33.1 % (ref 32–34.5)
MCV RBC AUTO: 100.8 FL (ref 80–99.9)
METER GLUCOSE: 277 MG/DL (ref 74–99)
METER GLUCOSE: 96 MG/DL (ref 74–99)
MRSA SPEC QL CULT: NORMAL
PHOSPHATE SERPL-MCNC: 4.3 MG/DL (ref 2.5–4.5)
PLATELET # BLD AUTO: 194 E9/L (ref 130–450)
PMV BLD AUTO: 9.8 FL (ref 7–12)
POTASSIUM SERPL-SCNC: 5.2 MMOL/L (ref 3.5–5)
PROCALCITONIN: 8.23 NG/ML (ref 0–0.08)
RBC # BLD AUTO: 2.49 E12/L (ref 3.5–5.5)
SODIUM SERPL-SCNC: 133 MMOL/L (ref 132–146)
WBC # BLD: 5.1 E9/L (ref 4.5–11.5)

## 2023-07-04 PROCEDURE — 6370000000 HC RX 637 (ALT 250 FOR IP): Performed by: FAMILY MEDICINE

## 2023-07-04 PROCEDURE — 2700000000 HC OXYGEN THERAPY PER DAY

## 2023-07-04 PROCEDURE — 2580000003 HC RX 258: Performed by: FAMILY MEDICINE

## 2023-07-04 PROCEDURE — 6370000000 HC RX 637 (ALT 250 FOR IP): Performed by: INTERNAL MEDICINE

## 2023-07-04 PROCEDURE — S5553 INSULIN LONG ACTING 5 U: HCPCS | Performed by: INTERNAL MEDICINE

## 2023-07-04 PROCEDURE — 2500000003 HC RX 250 WO HCPCS: Performed by: FAMILY MEDICINE

## 2023-07-04 PROCEDURE — 94660 CPAP INITIATION&MGMT: CPT

## 2023-07-04 PROCEDURE — 82962 GLUCOSE BLOOD TEST: CPT

## 2023-07-04 PROCEDURE — 36415 COLL VENOUS BLD VENIPUNCTURE: CPT

## 2023-07-04 PROCEDURE — 82785 ASSAY OF IGE: CPT

## 2023-07-04 PROCEDURE — 80048 BASIC METABOLIC PNL TOTAL CA: CPT

## 2023-07-04 PROCEDURE — 99232 SBSQ HOSP IP/OBS MODERATE 35: CPT | Performed by: INTERNAL MEDICINE

## 2023-07-04 PROCEDURE — 85027 COMPLETE CBC AUTOMATED: CPT

## 2023-07-04 PROCEDURE — 90935 HEMODIALYSIS ONE EVALUATION: CPT

## 2023-07-04 PROCEDURE — 83735 ASSAY OF MAGNESIUM: CPT

## 2023-07-04 PROCEDURE — 84145 PROCALCITONIN (PCT): CPT

## 2023-07-04 PROCEDURE — G0378 HOSPITAL OBSERVATION PER HR: HCPCS

## 2023-07-04 PROCEDURE — 84100 ASSAY OF PHOSPHORUS: CPT

## 2023-07-04 PROCEDURE — 82784 ASSAY IGA/IGD/IGG/IGM EACH: CPT

## 2023-07-04 PROCEDURE — 94640 AIRWAY INHALATION TREATMENT: CPT

## 2023-07-04 RX ORDER — DOXYCYCLINE HYCLATE 100 MG/1
100 CAPSULE ORAL EVERY 12 HOURS SCHEDULED
Qty: 10 CAPSULE | Refills: 0 | Status: SHIPPED | OUTPATIENT
Start: 2023-07-04 | End: 2023-07-09

## 2023-07-04 RX ORDER — GUAIFENESIN 200 MG/10ML
200 LIQUID ORAL EVERY 4 HOURS PRN
Qty: 236 ML | Refills: 0 | Status: SHIPPED | OUTPATIENT
Start: 2023-07-04

## 2023-07-04 RX ORDER — INSULIN GLARGINE 300 U/ML
INJECTION, SOLUTION SUBCUTANEOUS
Qty: 1 ADJUSTABLE DOSE PRE-FILLED PEN SYRINGE | Refills: 1 | Status: SHIPPED | OUTPATIENT
Start: 2023-07-04

## 2023-07-04 RX ORDER — INSULIN LISPRO 100 [IU]/ML
5 INJECTION, SOLUTION INTRAVENOUS; SUBCUTANEOUS
Qty: 4.5 ML | Refills: 1 | Status: SHIPPED | OUTPATIENT
Start: 2023-07-04 | End: 2023-09-02

## 2023-07-04 RX ADMIN — SALINE NASAL SPRAY 1 SPRAY: 1.5 SOLUTION NASAL at 11:30

## 2023-07-04 RX ADMIN — CALCIUM ACETATE 2668 MG: 667 CAPSULE ORAL at 07:06

## 2023-07-04 RX ADMIN — CALCIUM ACETATE 2668 MG: 667 CAPSULE ORAL at 18:03

## 2023-07-04 RX ADMIN — SALINE NASAL SPRAY 1 SPRAY: 1.5 SOLUTION NASAL at 06:05

## 2023-07-04 RX ADMIN — INSULIN GLARGINE-YFGN 10 UNITS: 100 INJECTION, SOLUTION SUBCUTANEOUS at 07:02

## 2023-07-04 RX ADMIN — CLOPIDOGREL BISULFATE 75 MG: 75 TABLET ORAL at 11:27

## 2023-07-04 RX ADMIN — INSULIN LISPRO 5 UNITS: 100 INJECTION, SOLUTION INTRAVENOUS; SUBCUTANEOUS at 07:02

## 2023-07-04 RX ADMIN — Medication 10 ML: at 11:28

## 2023-07-04 RX ADMIN — CALCIUM ACETATE 2668 MG: 667 CAPSULE ORAL at 12:11

## 2023-07-04 RX ADMIN — GUAIFENESIN 400 MG: 400 TABLET ORAL at 18:03

## 2023-07-04 RX ADMIN — DOXYCYCLINE HYCLATE 100 MG: 100 CAPSULE ORAL at 11:26

## 2023-07-04 RX ADMIN — IPRATROPIUM BROMIDE AND ALBUTEROL SULFATE 1 DOSE: .5; 2.5 SOLUTION RESPIRATORY (INHALATION) at 09:22

## 2023-07-04 RX ADMIN — CARVEDILOL 50 MG: 25 TABLET, FILM COATED ORAL at 11:26

## 2023-07-04 RX ADMIN — GUAIFENESIN 400 MG: 400 TABLET ORAL at 11:27

## 2023-07-04 RX ADMIN — SALINE NASAL SPRAY 1 SPRAY: 1.5 SOLUTION NASAL at 18:04

## 2023-07-04 RX ADMIN — FAMOTIDINE 20 MG: 20 TABLET, FILM COATED ORAL at 11:27

## 2023-07-04 RX ADMIN — INSULIN LISPRO 3 UNITS: 100 INJECTION, SOLUTION INTRAVENOUS; SUBCUTANEOUS at 07:02

## 2023-07-04 NOTE — DISCHARGE SUMMARY
renal failure superimposed on chronic kidney disease, on chronic dialysis, unspecified acute renal failure type ([address])   Acute respiratory distress        Discharge Instructions / Follow up: Follow-up with PCP within 1 week of discharge. Follow-up with consultants as indicated by them. Compliance with medications as prescribed on discharge. Future Appointments   Date Time Provider [address]   7/5/2023  1:00 [facility]   7/31/2023 10:30 AM Isha Sheppard MD [facility]   8/16/2023 11:15 AM Genie Perry MD [facility]   11/6/2023 10:45 AM Meng Moreno MD [facility]       The patient's condition is stable. At this time the patient is without objective evidence of an acute process requiring continuing hospitalization or inpatient management. They are stable for discharge with outpatient follow-up. I have spoken with the patient and discussed the results of the current hospitalization, in addition to providing specific details for the plan of care and counseling regarding the diagnosis and prognosis. The plan has been discussed in detail and they are aware of the specific conditions for emergent return, as well as the importance of follow-up. Their questions are answered at this time and they are agreeable with the plan for discharge to home with [facility]. Continued appropriate risk factor modification of blood pressure, diabetes and serum lipids will remain essential to reducing risk of future atherosclerotic development    Activity: activity as tolerated    Physical exam:  General appearance: No apparent distress, appears stated age and cooperative. obese  HEENT: Conjunctivae/corneas clear. Mucous membranes moist.  Neck: Supple. No JVD. Respiratory:  Clear to auscultation bilaterally. Normal respiratory effort. Cardiovascular:  RRR. S1, S2 without MRG. PV: Pulses palpable. No edema. Abdomen: Soft, non-tender, non-distended.

## 2023-07-04 NOTE — CONSULTS
ENDOCRINOLOGY INITIAL CONSULTATION NOTE      Date of admission: 6/29/2023  Date of service: 7/3/2023  Admitting physician: Maryjane Moreira DO   Primary Care Physician: Mehul Jaramillo DO  Consultant physician: William Deluca MD     Reason for the consultation:  Poorly controlled type 1 diabetes    History of Present Illness: The history is provided by the patient. Accuracy of the patient data is excellent    Jackie Arreola is a very pleasant 43 y.o. old can -American female with past medical history of poorly controlled type 1 diabetes, end-stage renal disease on hemodialysis, and other listed below admitted to [facility] on 6/29/2023 because of shortness of breath after missing missing hemodialysis sessions, endocrine service was consulted for diabetes management. Apparently, the patient missed her last 2 dialysis appointments. She denies history of fever chills nausea vomiting diarrhea chest pain or palpitations. Admission labs showed blood sugar 156, anion gap 18, bicarb 25, potassium 4.1, creatinine 15.7, sodium 139. Prior to admission  The patient was diagnosed with type 1 DM at the age 60. Prior to admission patient was on Lantus 16 units daily and Humalog per sliding scale. . Patient has had no hypoglycemic episodes. Patient has not been eating consistent carbohydrate meals, self-blood glucose monitoring has been above goal prior to admission. The patient reports both micro and macrovascular complications.  The patient is not up to date with yearly diabetic eye exam.   Lab Results   Component Value Date/Time    LABA1C 8.1 06/02/2023 04:39 AM       Inpatient diet:   Carb Restricted diet     Point of care glucose monitoring   (Independently reviewed)   Recent Labs     07/02/23  1303 07/02/23  1317 07/02/23  1330 07/02/23  1404 07/02/23  1559 07/02/23  1834 07/02/23  2200 07/03/23  0500   GLUMET 57* 177* 229* 216* 268* 343* 259* 441*       Past medical history:   Past Medical History:
Lab Results   Component Value Date/Time    LABA1C 8.1 06/02/2023 04:39 AM    GLUCOSE 456 07/03/2023 04:14 AM    GLUCOSE 279 07/27/2011 04:40 PM    LABCREA 61 08/17/2016 04:00 PM     Lab Results   Component Value Date/Time    TRIG 68 10/08/2013 10:40 AM    HDL 40 01/29/2021 06:14 AM    LDLCALC 41 01/29/2021 06:14 AM    CHOL 155 10/08/2013 10:40 AM       Blood culture   Lab Results   Component Value Date/Time    BC 5 Days no growth 06/03/2023 08:36 AM    BC 5 Days no growth 03/22/2023 02:01 PM       Radiology:  CT CHEST WO CONTRAST   Final Result   1. Worsening of bilateral infiltrates since June 4.      2.  Differential diagnosis can include now subacute pneumonia, worsening of   vascular congestion/volume overload in presence of chronic renal failure, and   in the presence of developing bilateral pleural effusions (mild-to-moderate   degree on the right mild degree on the left). 3.  Correlation with the clinical data is recommended. 4.  Presence of chronic renal parenchymal disease. XR CHEST PORTABLE   Final Result   Slightly improved aeration of the lungs compared with June 29, 2023. Moderate persistent bilateral reticular and hazy airspace opacities are   present. 10 mm nodular opacity overlying the right lower lung zone. Radiographic or   CT follow-up to complete resolution is recommended to exclude a neoplastic   process. XR CHEST PORTABLE   Final Result   Interval development diffuse congestive heart failure. Stable mild cardiac enlargement.              Medical Records/Labs/Images review:   I personally reviewed and summarized previous records   All labs and imaging were reviewed independently     [facility], a 43 y.o.-old female seen today for inpatient diabetes management    Diabetes Mellitus type   Patient's diabetes is uncontrolled   We recommend the following DM regimen  Lantus 10 units in the morning and 5 units at
is unable to be accurately assessed on this single portable view of the chest, but appears to be stable. No acute osseous abnormality. Slightly improved aeration of the lungs compared with June 29, 2023. Moderate persistent bilateral reticular and hazy airspace opacities are present. 10 mm nodular opacity overlying the right lower lung zone. Radiographic or CT follow-up to complete resolution is recommended to exclude a neoplastic process. XR CHEST PORTABLE    Result Date: 6/29/2023  EXAMINATION: ONE XRAY VIEW OF THE CHEST 6/29/2023 7:10 pm COMPARISON: CT chest, 06/04/2023; chest two view, 06/03/2023 HISTORY: ORDERING SYSTEM PROVIDED HISTORY: sob, hx dialysis TECHNOLOGIST PROVIDED HISTORY: Reason for exam:->sob, hx dialysis What reading provider will be dictating this exam?->CRC FINDINGS: Lines, tubes, and devices: None. Lungs and pleura: There is interval development of small bilateral pleural effusions, pulmonary vascular redistribution, and alveolar edema, predominately in the right lung. No pneumothorax. Cardiomediastinal silhouette: The mediastinum is stable. The heart is mildly enlarged. The trachea is midline. Bones and soft tissues: Hardware bridges the visualized right humeral shaft. Interval development diffuse congestive heart failure. Stable mild cardiac enlargement. Assessment:   Hypoxemic respiratory failure likely second to volume overload  Tobacco use  Prior marijuana use  ESRD on HD  Echo reviewed with EF of 60 to 65%, normal right ventricular size and function, RVSP of 48. Recent viral infections  Hyponatremia/electrolyte imbalances        Plan:   Upon my evaluation the patient was on 4 L nasal cannula with her oxygen saturation being 99%. He reports that she only wears her oxygen at home when she has missed her dialysis appointments and has extra volume on board.   I doubt that she requires 4 L nasal cannula at all times and I have weaned her oxygen down to 2 L while I was

## 2023-07-07 LAB — IGE SERPL-ACNC: 292 KU/L

## 2023-07-09 DIAGNOSIS — E16.2 HYPOGLYCEMIA: ICD-10-CM

## 2023-07-10 RX ORDER — DEXTROSE 15 G/37.5G
GEL ORAL
Qty: 112.5 G | Refills: 5 | Status: SHIPPED | OUTPATIENT
Start: 2023-07-10

## 2023-07-10 NOTE — TELEPHONE ENCOUNTER
Last Appointment:  3/31/2023  Future Appointments   Date Time Provider [address]   7/31/2023 10:30 AM Renea Rodriges MD [facility]   8/16/2023 11:15 AM Renea Rodriges MD [facility]   11/6/2023 10:45 AM Rock Phillip MD [facility]

## 2023-07-11 ENCOUNTER — APPOINTMENT (OUTPATIENT)
Dept: GENERAL RADIOLOGY | Age: 38
DRG: 640 | End: 2023-07-11
Payer: MEDICARE

## 2023-07-11 ENCOUNTER — HOSPITAL ENCOUNTER (INPATIENT)
Age: 38
LOS: 1 days | Discharge: HOME | DRG: 640 | End: 2023-07-11
Attending: EMERGENCY MEDICINE | Admitting: FAMILY MEDICINE
Payer: MEDICARE

## 2023-07-11 VITALS
BODY MASS INDEX: 30.24 KG/M2 | HEART RATE: 100 BPM | HEIGHT: 59 IN | TEMPERATURE: 98.7 F | WEIGHT: 150 LBS | RESPIRATION RATE: 22 BRPM | OXYGEN SATURATION: 93 % | DIASTOLIC BLOOD PRESSURE: 65 MMHG | SYSTOLIC BLOOD PRESSURE: 140 MMHG

## 2023-07-11 DIAGNOSIS — J96.21 ACUTE ON CHRONIC RESPIRATORY FAILURE WITH HYPOXIA (HCC): Primary | ICD-10-CM

## 2023-07-11 DIAGNOSIS — D64.9 CHRONIC ANEMIA: ICD-10-CM

## 2023-07-11 DIAGNOSIS — N18.6 ESRD ON DIALYSIS (HCC): ICD-10-CM

## 2023-07-11 DIAGNOSIS — R77.8 ELEVATED TROPONIN: ICD-10-CM

## 2023-07-11 DIAGNOSIS — J81.0 ACUTE PULMONARY EDEMA (HCC): ICD-10-CM

## 2023-07-11 DIAGNOSIS — Z99.2 ESRD ON DIALYSIS (HCC): ICD-10-CM

## 2023-07-11 LAB
ALBUMIN SERPL-MCNC: 4 G/DL (ref 3.5–5.2)
ALP SERPL-CCNC: 107 U/L (ref 35–104)
ALT SERPL-CCNC: 42 U/L (ref 0–32)
ANION GAP SERPL CALCULATED.3IONS-SCNC: 18 MMOL/L (ref 7–16)
AST SERPL-CCNC: 58 U/L (ref 0–31)
B.E.: -4.8 MMOL/L (ref -3–3)
BASOPHILS # BLD: 0.02 E9/L (ref 0–0.2)
BASOPHILS NFR BLD: 0.3 % (ref 0–2)
BILIRUB SERPL-MCNC: 0.6 MG/DL (ref 0–1.2)
BNP BLD-MCNC: ABNORMAL PG/ML (ref 0–125)
BUN SERPL-MCNC: 101 MG/DL (ref 6–20)
CALCIUM SERPL-MCNC: 9.5 MG/DL (ref 8.6–10.2)
CHLORIDE SERPL-SCNC: 96 MMOL/L (ref 98–107)
CO2 SERPL-SCNC: 22 MMOL/L (ref 22–29)
COHB: 1.1 % (ref 0–1.5)
CREAT SERPL-MCNC: 11.7 MG/DL (ref 0.5–1)
CRITICAL: ABNORMAL
DATE ANALYZED: ABNORMAL
DATE OF COLLECTION: ABNORMAL
EKG ATRIAL RATE: 112 BPM
EKG P AXIS: 44 DEGREES
EKG P-R INTERVAL: 120 MS
EKG Q-T INTERVAL: 326 MS
EKG QRS DURATION: 54 MS
EKG QTC CALCULATION (BAZETT): 444 MS
EKG R AXIS: 10 DEGREES
EKG T AXIS: 78 DEGREES
EKG VENTRICULAR RATE: 112 BPM
EOSINOPHIL # BLD: 0.2 E9/L (ref 0.05–0.5)
EOSINOPHIL NFR BLD: 3 % (ref 0–6)
ERYTHROCYTE [DISTWIDTH] IN BLOOD BY AUTOMATED COUNT: 16.2 FL (ref 11.5–15)
GLUCOSE SERPL-MCNC: 280 MG/DL (ref 74–99)
HCO3: 20.3 MMOL/L (ref 22–26)
HCT VFR BLD AUTO: 24.7 % (ref 34–48)
HGB BLD-MCNC: 8.4 G/DL (ref 11.5–15.5)
HHB: 4 % (ref 0–5)
IMM GRANULOCYTES # BLD: 0.02 E9/L
IMM GRANULOCYTES NFR BLD: 0.3 % (ref 0–5)
LAB: ABNORMAL
LYMPHOCYTES # BLD: 0.81 E9/L (ref 1.5–4)
LYMPHOCYTES NFR BLD: 12.2 % (ref 20–42)
Lab: ABNORMAL
MCH RBC QN AUTO: 32.7 PG (ref 26–35)
MCHC RBC AUTO-ENTMCNC: 34 % (ref 32–34.5)
MCV RBC AUTO: 96.1 FL (ref 80–99.9)
METER GLUCOSE: 259 MG/DL (ref 74–99)
METHB: 0.3 % (ref 0–1.5)
MODE: ABNORMAL
MONOCYTES # BLD: 0.5 E9/L (ref 0.1–0.95)
MONOCYTES NFR BLD: 7.6 % (ref 2–12)
NEUTROPHILS # BLD: 5.07 E9/L (ref 1.8–7.3)
NEUTS SEG NFR BLD: 76.6 % (ref 43–80)
O2 CONTENT: 12.1 ML/DL
O2 SATURATION: 95.9 % (ref 92–98.5)
O2HB: 94.6 % (ref 94–97)
OPERATOR ID: 166
PATIENT TEMP: 37 C
PCO2: 37.5 MMHG (ref 35–45)
PH BLOOD GAS: 7.35 (ref 7.35–7.45)
PLATELET # BLD AUTO: 196 E9/L (ref 130–450)
PMV BLD AUTO: 10.2 FL (ref 7–12)
PO2: 88.9 MMHG (ref 75–100)
POTASSIUM SERPL-SCNC: 5.2 MMOL/L (ref 3.5–5)
PROT SERPL-MCNC: 7.8 G/DL (ref 6.4–8.3)
RBC # BLD AUTO: 2.57 E12/L (ref 3.5–5.5)
SODIUM SERPL-SCNC: 136 MMOL/L (ref 132–146)
SOURCE, BLOOD GAS: ABNORMAL
THB: 9 G/DL (ref 11.5–16.5)
TIME ANALYZED: 1510
TROPONIN, HIGH SENSITIVITY: 146 NG/L (ref 0–9)
WBC # BLD: 6.6 E9/L (ref 4.5–11.5)

## 2023-07-11 PROCEDURE — 84484 ASSAY OF TROPONIN QUANT: CPT

## 2023-07-11 PROCEDURE — 90935 HEMODIALYSIS ONE EVALUATION: CPT

## 2023-07-11 PROCEDURE — 5A09357 ASSISTANCE WITH RESPIRATORY VENTILATION, LESS THAN 24 CONSECUTIVE HOURS, CONTINUOUS POSITIVE AIRWAY PRESSURE: ICD-10-PCS | Performed by: INTERNAL MEDICINE

## 2023-07-11 PROCEDURE — 83880 ASSAY OF NATRIURETIC PEPTIDE: CPT

## 2023-07-11 PROCEDURE — 94660 CPAP INITIATION&MGMT: CPT

## 2023-07-11 PROCEDURE — 85025 COMPLETE CBC W/AUTO DIFF WBC: CPT

## 2023-07-11 PROCEDURE — 82962 GLUCOSE BLOOD TEST: CPT

## 2023-07-11 PROCEDURE — 2060000000 HC ICU INTERMEDIATE R&B

## 2023-07-11 PROCEDURE — 93005 ELECTROCARDIOGRAM TRACING: CPT | Performed by: STUDENT IN AN ORGANIZED HEALTH CARE EDUCATION/TRAINING PROGRAM

## 2023-07-11 PROCEDURE — 5A1D70Z PERFORMANCE OF URINARY FILTRATION, INTERMITTENT, LESS THAN 6 HOURS PER DAY: ICD-10-PCS | Performed by: INTERNAL MEDICINE

## 2023-07-11 PROCEDURE — 71045 X-RAY EXAM CHEST 1 VIEW: CPT

## 2023-07-11 PROCEDURE — 82805 BLOOD GASES W/O2 SATURATION: CPT

## 2023-07-11 PROCEDURE — 93010 ELECTROCARDIOGRAM REPORT: CPT | Performed by: INTERNAL MEDICINE

## 2023-07-11 PROCEDURE — 99285 EMERGENCY DEPT VISIT HI MDM: CPT

## 2023-07-11 PROCEDURE — 80053 COMPREHEN METABOLIC PANEL: CPT

## 2023-07-11 PROCEDURE — 99223 1ST HOSP IP/OBS HIGH 75: CPT | Performed by: NURSE PRACTITIONER

## 2023-07-11 RX ORDER — ONDANSETRON 2 MG/ML
4 INJECTION INTRAMUSCULAR; INTRAVENOUS EVERY 6 HOURS PRN
Status: CANCELLED | OUTPATIENT
Start: 2023-07-11

## 2023-07-11 RX ORDER — FOLIC ACID/VIT B COMPLEX AND C 0.8 MG
1 TABLET ORAL DAILY
Status: CANCELLED | OUTPATIENT
Start: 2023-07-11

## 2023-07-11 RX ORDER — SODIUM CHLORIDE 0.9 % (FLUSH) 0.9 %
5-40 SYRINGE (ML) INJECTION EVERY 12 HOURS SCHEDULED
Status: CANCELLED | OUTPATIENT
Start: 2023-07-11

## 2023-07-11 RX ORDER — POLYETHYLENE GLYCOL 3350 17 G/17G
17 POWDER, FOR SOLUTION ORAL DAILY PRN
Status: CANCELLED | OUTPATIENT
Start: 2023-07-11

## 2023-07-11 RX ORDER — INSULIN LISPRO 100 [IU]/ML
0-4 INJECTION, SOLUTION INTRAVENOUS; SUBCUTANEOUS NIGHTLY
Status: CANCELLED | OUTPATIENT
Start: 2023-07-11

## 2023-07-11 RX ORDER — ONDANSETRON 4 MG/1
4 TABLET, ORALLY DISINTEGRATING ORAL EVERY 8 HOURS PRN
Status: CANCELLED | OUTPATIENT
Start: 2023-07-11

## 2023-07-11 RX ORDER — SODIUM CHLORIDE 9 MG/ML
INJECTION, SOLUTION INTRAVENOUS PRN
Status: CANCELLED | OUTPATIENT
Start: 2023-07-11

## 2023-07-11 RX ORDER — ACETAMINOPHEN 500 MG
500 TABLET ORAL EVERY 6 HOURS PRN
Status: CANCELLED | OUTPATIENT
Start: 2023-07-11

## 2023-07-11 RX ORDER — ACETAMINOPHEN 325 MG/1
650 TABLET ORAL EVERY 6 HOURS PRN
Status: CANCELLED | OUTPATIENT
Start: 2023-07-11

## 2023-07-11 RX ORDER — DEXTROSE MONOHYDRATE 100 MG/ML
INJECTION, SOLUTION INTRAVENOUS CONTINUOUS PRN
Status: CANCELLED | OUTPATIENT
Start: 2023-07-11

## 2023-07-11 RX ORDER — ALBUTEROL SULFATE 90 UG/1
2 AEROSOL, METERED RESPIRATORY (INHALATION) EVERY 4 HOURS PRN
Status: CANCELLED | OUTPATIENT
Start: 2023-07-11

## 2023-07-11 RX ORDER — INSULIN LISPRO 100 [IU]/ML
0-4 INJECTION, SOLUTION INTRAVENOUS; SUBCUTANEOUS
Status: CANCELLED | OUTPATIENT
Start: 2023-07-11

## 2023-07-11 RX ORDER — CLOPIDOGREL BISULFATE 75 MG/1
75 TABLET ORAL DAILY
Status: CANCELLED | OUTPATIENT
Start: 2023-07-11

## 2023-07-11 RX ORDER — SODIUM CHLORIDE 0.9 % (FLUSH) 0.9 %
5-40 SYRINGE (ML) INJECTION PRN
Status: CANCELLED | OUTPATIENT
Start: 2023-07-11

## 2023-07-11 RX ORDER — FAMOTIDINE 20 MG/1
10 TABLET, FILM COATED ORAL EVERY MORNING
Status: CANCELLED | OUTPATIENT
Start: 2023-07-12

## 2023-07-11 RX ORDER — ACETAMINOPHEN 650 MG/1
650 SUPPOSITORY RECTAL EVERY 6 HOURS PRN
Status: CANCELLED | OUTPATIENT
Start: 2023-07-11

## 2023-07-11 RX ORDER — HEPARIN SODIUM 5000 [USP'U]/ML
5000 INJECTION, SOLUTION INTRAVENOUS; SUBCUTANEOUS EVERY 8 HOURS SCHEDULED
Status: CANCELLED | OUTPATIENT
Start: 2023-07-11

## 2023-07-11 RX ORDER — CALCIUM ACETATE 667 MG/1
4 CAPSULE ORAL
Status: CANCELLED | OUTPATIENT
Start: 2023-07-11

## 2023-07-11 RX ORDER — CARVEDILOL 25 MG/1
50 TABLET ORAL EVERY MORNING
Status: CANCELLED | OUTPATIENT
Start: 2023-07-12

## 2023-07-11 ASSESSMENT — PAIN DESCRIPTION - LOCATION: LOCATION: CHEST;ABDOMEN

## 2023-07-11 ASSESSMENT — PAIN - FUNCTIONAL ASSESSMENT: PAIN_FUNCTIONAL_ASSESSMENT: 0-10

## 2023-07-11 ASSESSMENT — PAIN DESCRIPTION - DESCRIPTORS: DESCRIPTORS: TIGHTNESS

## 2023-07-11 ASSESSMENT — PAIN SCALES - GENERAL
PAINLEVEL_OUTOF10: 0
PAINLEVEL_OUTOF10: 9

## 2023-07-11 NOTE — CONSULTS
[facility]  Nephrology Progress Note    Patient's Name: Pavithra John    History of Present Illness from 7/4 note by Dr. Juancarlos Courtney:    Chela Min full consult is deferred as patient is followed by our service in the outpatient dialysis setting. Briefly, Pavithra John is a 43 y.o. female with a past medical history of ESRD, hypertension, and diabetes. She presented to the ED on 6/29 with complaints of shortness of breath, she had reportedly missed 2 dialysis treatments. Vital signs on 6/29 includes temperature 98, respirations 22, pulse 110, /78, and she was 97% SPO2. Lab data on 6/29 includes BUN 89, creatinine 15.7, troponin 166, and hemoglobin 7.3. She had a chest x-ray on 6/29 which showed interval development diffuse congestive heart failure. Our service was consulted to see the patient for missed dialysis. Patient is known to our service and dialyzes at [address] [place] [address] Rd second shift via left arm AV fistula. \"  Pt was discharged home on 7/4/2023 post inpt dialysis. She states she went to outpt dialysis on 7/6/233 and then missed 7/8/23 and today dueto the fact she has to have  for her dtr who is disabled. She notes she developed worsening SOB so she came to he ED. No fever or chills.  CXR showed diffuse bilat infiltrates      PMH:    Past Medical History:   Diagnosis Date    JOLLY (acute kidney injury) ([address]) 4/5/2016    AVF (arteriovenous fistula) ([address]) 02/19/2018    let arm    Chronic kidney disease     Dialysis AV fistula malfunction, initial encounter ([address]) 11/7/2019    DM type 1 (diabetes mellitus, type 1) ([address])     Encounter regarding vascular access for dialysis for ESRD ([address]) 07/12/2018    ESRD (end stage renal disease) ([address])     Hemodialysis patient ([address])     jaquelineus eunice mon wed fri / graft in left arm    Hypertension     Tobacco abuse 4/5/2016       Patient Active Problem List   Diagnosis    Type 1 diabetes mellitus with chronic kidney disease on chronic dialysis

## 2023-07-12 ENCOUNTER — CARE COORDINATION (OUTPATIENT)
Dept: CASE MANAGEMENT | Age: 38
End: 2023-07-12

## 2023-07-12 NOTE — PROGRESS NOTES
07/11/23 2140   NIV Type   Mode Bilevel  (pt refusing.)     Spoke with the patient in room, for bipap use. Pt refused for bipap to be brought up to wear, stating that she will be leaving the hospital and not planning to spend the night due to having to take care of her child at home with disabilities. Pt does not want bipap and only would wear if she stays tonight. Talked with her RN.

## 2023-07-12 NOTE — PROGRESS NOTES
Patient arrived on floor and stated that she was not spending the night and that she was signing out [address]. States she has a daughter with disabilities that she needs to take care of and cannot leaver her alone, patient educated on the importance of receiving treatment in the hospital and the importance of following up outpatient. Patient verbalized understanding and is still wanting to sign out AMA.

## 2023-07-12 NOTE — FLOWSHEET NOTE
07/11/23 2008   Vital Signs   BP (!) 164/66   Pulse (!) 121   Respirations 24   Post-Hemodialysis Assessment   Post-Treatment Procedures Blood returned; Access bleeding time < 10 minutes   Machine Disinfection Process Acid/Vinegar Clean;Heat Disinfect   Blood Volume Processed (Liters) 80.3 l/min   Dialyzer Clearance Lightly streaked   Duration of Treatment (minutes) 210 minutes   Hemodialysis Intake (ml) 300 ml   Hemodialysis Output (ml) 2800 ml   NET Removed (ml) 2500   Tolerated Treatment Good   Patient Response to Treatment tolerated tx well vss removed 2.5L without difficulty.  pts breathing has improved throughout tx. sites held and dressed + bruit + thrill  no bleeding   Bilateral Breath Sounds Diminished   Time Off 2008   Patient Disposition Return to room

## 2023-07-12 NOTE — ED PROVIDER NOTES
[facility] INTERMEDIATE  EMERGENCY DEPARTMENT ENCOUNTER        Pt Name: Tereza Phillips  MRN: [id number]  [address] [facility] 1985  Date of evaluation: 7/11/2023  Provider: Malena Chanel DO  PCP: Milagros Hess DO  Note Started: 12:36 AM EDT 7/12/23    CHIEF COMPLAINT       Chief Complaint   Patient presents with    Shortness of Breath     Missed dialysis       HISTORY OF PRESENT ILLNESS: 1 or more Elements   History From: Patient     Limitations to history : None    Tereza Phillips is a 43 y.o. female with past medical history of end-stage renal disease on chronic dialysis, hypertension, diabetes, obesity, DVT and hydroadenitis suppurativa who presents to the emergency department due to worsening shortness of breath. Patient does state that she missed 2 dialysis sessions recently. She has been having issues with missing dialysis because she is unable to find childcare for her autistic daughter at home. Patient generally has dialysis 3 times a week and follows with Dr. Leo Adams for nephrology. She is endorsing worsening shortness of breath. Patient is on chronic oxygen at 6 L around-the-clock. On initial evaluation, patient does have signs of acute respiratory distress with increased work of breathing and tachypnea. Likely fluid overload from missed dialysis. No nasal flaring, grunting or cyanosis noted. Patient denies any associated chest pain. She has no other symptoms otherwise including nausea, vomiting, fever, chills, cough, congestion, sore throat, abdominal pain, urinary symptoms, flank pain, numbness, tingling, dizziness, lightheadedness, syncope, constipation or diarrhea. Patient is chronically ill-appearing but nontoxic-appearing on initial assessment. Respiratory distress as noted above. She denies any recent sick contacts or illnesses.     Nursing Notes were all reviewed and agreed with or any disagreements were addressed in the HPI.    ROS:   Pertinent positives and

## 2023-07-13 ENCOUNTER — TELEPHONE (OUTPATIENT)
Dept: FAMILY MEDICINE CLINIC | Age: 38
End: 2023-07-13

## 2023-07-13 ENCOUNTER — CARE COORDINATION (OUTPATIENT)
Dept: CASE MANAGEMENT | Age: 38
End: 2023-07-13

## 2023-07-13 NOTE — CARE COORDINATION
Care Transitions Outreach Attempt    Call within 2 business days of discharge: Yes     2nd attempt to reach the patient for initial Care Transition call post hospital discharge. Messages left on both pt's primary and secondary phone lines listed with CTN's contact information requesting return phone call. No further outreaches will be attempted. If no return call by the end of today, CTN will  sign off and resolve CT episode    CTN will route high priority message to [facility] office staff requesting they attempt to reach pt and offer a 7d HFU appt. Per chart review the patient is  active on [facility], CTN will send unable to reach letter in [facility]. Patient: Tomasz Arroyo Patient : 1985 MRN: [id number]    Last Discharge [address]       Date Complaint Diagnosis Description Type Department Provider    23 Shortness of Breath Acute on chronic respiratory failure with hypoxia ([address]) . .. ED to Hosp-Admission (Discharged) (ADMITTED) KG 6S Malena Mcgovern DO; Humberto Speak. .. Was this an external facility discharge?  No     Noted following upcoming appointments from discharge chart review:   [facility] follow up appointment(s):   Future Appointments   Date Time Provider [address]   2023 10:30 AM Arlin Merrill MD [facility]   2023 11:15 AM Brennan Leach MD [facility]   2023 10:45 AM Dawna Post MD [facility]     [facility] follow up appointment(s):

## 2023-07-13 NOTE — TELEPHONE ENCOUNTER
I called patient to schedule follow up appointment there was no answer, I left a voicemail to call office to schedule. Phone number provider.

## 2023-07-14 ENCOUNTER — TELEPHONE (OUTPATIENT)
Dept: FAMILY MEDICINE CLINIC | Age: 38
End: 2023-07-14

## 2023-07-22 DIAGNOSIS — K21.9 GASTROESOPHAGEAL REFLUX DISEASE WITHOUT ESOPHAGITIS: ICD-10-CM

## 2023-07-24 RX ORDER — PSEUDOEPHED/ACETAMINOPH/DIPHEN 30MG-500MG
TABLET ORAL
Qty: 120 TABLET | Refills: 0 | Status: SHIPPED | OUTPATIENT
Start: 2023-07-24

## 2023-07-24 RX ORDER — FAMOTIDINE 20 MG/1
20 TABLET, FILM COATED ORAL EVERY MORNING
Qty: 30 TABLET | Refills: 3 | OUTPATIENT
Start: 2023-07-24

## 2023-07-24 RX ORDER — FAMOTIDINE 20 MG/1
20 TABLET, FILM COATED ORAL EVERY MORNING
Qty: 30 TABLET | Refills: 3 | Status: SHIPPED | OUTPATIENT
Start: 2023-07-24

## 2023-07-24 NOTE — TELEPHONE ENCOUNTER
Last Appointment:  3/31/2023  Future Appointments   Date Time Provider [address]   7/26/2023  1:30 PM Marylee Gills, [facility]   7/31/2023 10:30 AM Lucía Yañez MD [facility]   8/16/2023 11:15 AM Lucía Yañez MD [facility]   11/6/2023 10:45 AM Marina Schwab, MD [facility]

## 2023-07-24 NOTE — TELEPHONE ENCOUNTER
Last Appointment:  3/31/2023  Future Appointments   Date Time Provider [address]   7/26/2023  1:30 PM Ofelia Ann [facility]   7/31/2023 10:30 AM Isrrael Arce MD [facility]   8/16/2023 11:15 AM Juan Husbands Jumana Hernandez MD [facility]   11/6/2023 10:45 AM Art Chen MD [facility]      Famotidine is a duplicate request

## 2023-07-26 ENCOUNTER — OFFICE VISIT (OUTPATIENT)
Dept: FAMILY MEDICINE CLINIC | Age: 38
End: 2023-07-26
Payer: MEDICARE

## 2023-07-26 VITALS
HEART RATE: 107 BPM | BODY MASS INDEX: 29.03 KG/M2 | OXYGEN SATURATION: 87 % | HEIGHT: 59 IN | RESPIRATION RATE: 16 BRPM | WEIGHT: 144 LBS | DIASTOLIC BLOOD PRESSURE: 60 MMHG | SYSTOLIC BLOOD PRESSURE: 120 MMHG | TEMPERATURE: 97.6 F

## 2023-07-26 DIAGNOSIS — Z99.2 ESRD ON HEMODIALYSIS (HCC): ICD-10-CM

## 2023-07-26 DIAGNOSIS — R09.02 HYPOXIA: ICD-10-CM

## 2023-07-26 DIAGNOSIS — Z99.2 TYPE 1 DIABETES MELLITUS WITH CHRONIC KIDNEY DISEASE ON CHRONIC DIALYSIS (HCC): ICD-10-CM

## 2023-07-26 DIAGNOSIS — N18.6 ESRD ON HEMODIALYSIS (HCC): ICD-10-CM

## 2023-07-26 DIAGNOSIS — Z09 HOSPITAL DISCHARGE FOLLOW-UP: Primary | ICD-10-CM

## 2023-07-26 DIAGNOSIS — E10.22 TYPE 1 DIABETES MELLITUS WITH CHRONIC KIDNEY DISEASE ON CHRONIC DIALYSIS (HCC): ICD-10-CM

## 2023-07-26 DIAGNOSIS — N18.6 TYPE 1 DIABETES MELLITUS WITH CHRONIC KIDNEY DISEASE ON CHRONIC DIALYSIS (HCC): ICD-10-CM

## 2023-07-26 PROBLEM — N18.9 ACUTE RENAL FAILURE SUPERIMPOSED ON CHRONIC KIDNEY DISEASE, ON CHRONIC DIALYSIS, UNSPECIFIED ACUTE RENAL FAILURE TYPE (HCC): Status: RESOLVED | Noted: 2023-07-03 | Resolved: 2023-07-26

## 2023-07-26 PROBLEM — N17.9 ACUTE RENAL FAILURE SUPERIMPOSED ON CHRONIC KIDNEY DISEASE, ON CHRONIC DIALYSIS, UNSPECIFIED ACUTE RENAL FAILURE TYPE (HCC): Status: RESOLVED | Noted: 2023-07-03 | Resolved: 2023-07-26

## 2023-07-26 PROBLEM — D63.1 ANEMIA OF CHRONIC RENAL FAILURE, STAGE 5 (HCC): Status: RESOLVED | Noted: 2022-03-18 | Resolved: 2023-07-26

## 2023-07-26 PROBLEM — N18.5 ANEMIA OF CHRONIC RENAL FAILURE, STAGE 5 (HCC): Status: RESOLVED | Noted: 2022-03-18 | Resolved: 2023-07-26

## 2023-07-26 PROCEDURE — 3078F DIAST BP <80 MM HG: CPT | Performed by: INTERNAL MEDICINE

## 2023-07-26 PROCEDURE — 1111F DSCHRG MED/CURRENT MED MERGE: CPT | Performed by: INTERNAL MEDICINE

## 2023-07-26 PROCEDURE — 3074F SYST BP LT 130 MM HG: CPT | Performed by: INTERNAL MEDICINE

## 2023-07-26 PROCEDURE — 2022F DILAT RTA XM EVC RTNOPTHY: CPT | Performed by: INTERNAL MEDICINE

## 2023-07-26 PROCEDURE — G8417 CALC BMI ABV UP PARAM F/U: HCPCS | Performed by: INTERNAL MEDICINE

## 2023-07-26 PROCEDURE — 99214 OFFICE O/P EST MOD 30 MIN: CPT | Performed by: INTERNAL MEDICINE

## 2023-07-26 PROCEDURE — 3052F HG A1C>EQUAL 8.0%<EQUAL 9.0%: CPT | Performed by: INTERNAL MEDICINE

## 2023-07-26 PROCEDURE — G8427 DOCREV CUR MEDS BY ELIG CLIN: HCPCS | Performed by: INTERNAL MEDICINE

## 2023-07-26 PROCEDURE — 4004F PT TOBACCO SCREEN RCVD TLK: CPT | Performed by: INTERNAL MEDICINE

## 2023-07-26 RX ORDER — PROCHLORPERAZINE 25 MG/1
SUPPOSITORY RECTAL
COMMUNITY
Start: 2023-07-19

## 2023-07-26 NOTE — PROGRESS NOTES
Post-Discharge Transitional Care  Follow Up      Michele Cronin   YOB: 1985    Date of Office Visit:  7/26/2023  Date of Hospital Admission: 7/11/23  Date of Hospital Discharge: 7/11/23  Risk of hospital readmission (high >=14%. Medium >=10%) :Readmission Risk Score: 30.8      Care management risk score Rising risk (score 2-5) and Complex Care (Scores >=6): No Risk Score On File     Non face to face  following discharge, date last encounter closed (first attempt may have been earlier): 07/13/2023    Call initiated 2 business days of discharge: Yes    ASSESSMENT/PLAN:   Hospital discharge follow-up  -     AK DISCHARGE MEDS RECONCILED W/ CURRENT OUTPATIENT MED LIST  ESRD on hemodialysis ([address])  - stable, compliant with dialysis   - follow up in a few months   Type 1 diabetes mellitus with chronic kidney disease on chronic dialysis (HCC)  -     Insulin Pen Needle 32G X 4 MM MISC; 5 times daily Starting Wed 7/26/2023, Disp-1500 each, R-5, Normal  - stable, following with endocrinology and going to be getting on an insulin pump   - follow up in a few months, has appt with endo next week  Hypoxia  -On 5 liters O2 at home   -Mobile within the home and needs portable oxygen concentrator   -Order for portable O2 signed and faxed to [facility]     Medical Decision Making: moderate complexity  Return in about 4 months (around 11/26/2023). Subjective:   HPI:  Follow up of Hospital problems/diagnosis(es):    ESRD (end stage renal disease) ([address])   Acute renal failure superimposed on chronic kidney disease, on chronic dialysis, unspecified acute renal failure type ([address])   Acute respiratory distress    Inpatient course: Discharge summary reviewed- see chart. This is a 50-year-old lady with PMH ESRD on HD TTS, HTN, diabetes mellitus type 1, chronic anemia, tobacco abuse presented to [facility] due to chief concerns of shortness of breath.   Chest x-ray showed findings

## 2023-07-31 ENCOUNTER — OFFICE VISIT (OUTPATIENT)
Dept: ENDOCRINOLOGY | Age: 38
End: 2023-07-31
Payer: MEDICARE

## 2023-07-31 VITALS
SYSTOLIC BLOOD PRESSURE: 130 MMHG | HEIGHT: 59 IN | BODY MASS INDEX: 29.64 KG/M2 | DIASTOLIC BLOOD PRESSURE: 74 MMHG | OXYGEN SATURATION: 99 % | RESPIRATION RATE: 18 BRPM | HEART RATE: 105 BPM | WEIGHT: 147 LBS

## 2023-07-31 DIAGNOSIS — E10.69 TYPE 1 DIABETES MELLITUS WITH OTHER SPECIFIED COMPLICATION (HCC): Primary | ICD-10-CM

## 2023-07-31 DIAGNOSIS — Z91.119 DIETARY NONCOMPLIANCE: ICD-10-CM

## 2023-07-31 PROCEDURE — 1111F DSCHRG MED/CURRENT MED MERGE: CPT | Performed by: INTERNAL MEDICINE

## 2023-07-31 PROCEDURE — 3052F HG A1C>EQUAL 8.0%<EQUAL 9.0%: CPT | Performed by: INTERNAL MEDICINE

## 2023-07-31 PROCEDURE — 95251 CONT GLUC MNTR ANALYSIS I&R: CPT | Performed by: INTERNAL MEDICINE

## 2023-07-31 PROCEDURE — 3075F SYST BP GE 130 - 139MM HG: CPT | Performed by: INTERNAL MEDICINE

## 2023-07-31 PROCEDURE — G8417 CALC BMI ABV UP PARAM F/U: HCPCS | Performed by: INTERNAL MEDICINE

## 2023-07-31 PROCEDURE — 99214 OFFICE O/P EST MOD 30 MIN: CPT | Performed by: INTERNAL MEDICINE

## 2023-07-31 PROCEDURE — G8427 DOCREV CUR MEDS BY ELIG CLIN: HCPCS | Performed by: INTERNAL MEDICINE

## 2023-07-31 PROCEDURE — 3078F DIAST BP <80 MM HG: CPT | Performed by: INTERNAL MEDICINE

## 2023-07-31 PROCEDURE — 2022F DILAT RTA XM EVC RTNOPTHY: CPT | Performed by: INTERNAL MEDICINE

## 2023-07-31 PROCEDURE — 4004F PT TOBACCO SCREEN RCVD TLK: CPT | Performed by: INTERNAL MEDICINE

## 2023-07-31 RX ORDER — INSULIN LISPRO 100 [IU]/ML
INJECTION, SOLUTION INTRAVENOUS; SUBCUTANEOUS
Qty: 20 ML | Refills: 5 | Status: SHIPPED | OUTPATIENT
Start: 2023-07-31

## 2023-07-31 NOTE — PROGRESS NOTES
[facility] Department of Endocrinology Diabetes and Metabolism   [facility] [address]   Phone: [phone]  Fax: [phone]    Date of Service: 7/31/2023  Primary Care Physician: Suzann Cockayne, DO  Provider: Markos Chin MD    Reason for the visit:  DM type 1     History of Present Illness: The history is provided by the patient. No  was used. Accuracy of the patient data is excellent.   Fam Young is a very pleasant 43 y.o. female seen today for diabetes management     Fam Young was diagnosed with diabetes at age 9  and currently on Lantus 14 u nightly, Humalog 5u per sliding scale  The patient has been checking blood sugar DEXCOM CGM   Most recent A1c results summarized below    The patient recently received Omnipod 5 insulin pump and waiting to start training     Lab Results   Component Value Date/Time    LABA1C 8.1 06/02/2023 04:39 AM    LABA1C 7.8 05/04/2023 08:31 PM    LABA1C 7.3 03/23/2023 05:19 AM     Patient reported hypoglycemic episodes   The patient has been mindful of what has been eating and following diabetes diet    I reviewed current medications and the patient has no issues with diabetes medications  Fam Young is up to date with eye exam and denied any history of diabetic retinopathy   The patient seeing podiatrist every   And also performs  own feet care  ESRD on HD   Macrovascular complications: no CAD, PVD, or Stroke  The patient receives Flushot every year and up to date with the Pneumonia vaccine      PAST MEDICAL HISTORY   Past Medical History:   Diagnosis Date    JOLLY (acute kidney injury) ([address]) 4/5/2016    AVF (arteriovenous fistula) ([address]) 02/19/2018    let arm    Chronic kidney disease     Dialysis AV fistula malfunction, initial encounter ([address]) 11/7/2019    DM type 1 (diabetes mellitus, type 1) ([address])     Encounter regarding vascular access for dialysis for ESRD

## 2023-08-17 ENCOUNTER — PATIENT MESSAGE (OUTPATIENT)
Dept: FAMILY MEDICINE CLINIC | Age: 38
End: 2023-08-17

## 2023-08-17 NOTE — TELEPHONE ENCOUNTER
From: Darshan Melgar  To: Dr. Roby Fuller: 8/17/2023 12:48 PM EDT  Subject: Prescription Refill Question     Is there away you can send a prescription over to my pharmacy for my Glucagon Emergency Kit because I have no more refills available and my Endocrinologist is not responding to the pharmacy request and I need them because my blood sugars have been dropping terribly and that's a fast acting when it does as well and I need it.  The pharmacy is [facility] [phone] , thank you so much in advance any questions please call me

## 2023-08-18 RX ORDER — IBUPROFEN 600 MG/1
1 TABLET ORAL PRN
Qty: 1 KIT | Refills: 5 | Status: SHIPPED | OUTPATIENT
Start: 2023-08-18

## 2023-08-21 RX ORDER — PSEUDOEPHED/ACETAMINOPH/DIPHEN 30MG-500MG
TABLET ORAL
Qty: 120 TABLET | Refills: 0 | Status: SHIPPED | OUTPATIENT
Start: 2023-08-21

## 2023-08-21 NOTE — TELEPHONE ENCOUNTER
Last Appointment:  7/26/2023  Future Appointments   Date Time Provider [address]   11/6/2023 10:45 AM Jennyfer Dawson MD [facility]   11/20/2023 12:30 PM KEMI Vergara - CNP [facility]   11/29/2023 11:00 AM Alhaji Sloan DO [facility]   1/3/2024  1:30 PM Andre Grimaldo MD [facility]

## 2023-08-30 ENCOUNTER — TELEMEDICINE (OUTPATIENT)
Dept: FAMILY MEDICINE CLINIC | Age: 38
End: 2023-08-30
Payer: MEDICARE

## 2023-08-30 DIAGNOSIS — J96.21 ACUTE ON CHRONIC RESPIRATORY FAILURE WITH HYPOXIA (HCC): Primary | ICD-10-CM

## 2023-08-30 PROCEDURE — 99215 OFFICE O/P EST HI 40 MIN: CPT | Performed by: INTERNAL MEDICINE

## 2023-08-30 PROCEDURE — G8417 CALC BMI ABV UP PARAM F/U: HCPCS | Performed by: INTERNAL MEDICINE

## 2023-08-30 PROCEDURE — 4004F PT TOBACCO SCREEN RCVD TLK: CPT | Performed by: INTERNAL MEDICINE

## 2023-08-30 PROCEDURE — G8428 CUR MEDS NOT DOCUMENT: HCPCS | Performed by: INTERNAL MEDICINE

## 2023-08-30 NOTE — PROGRESS NOTES
colonization    ESRD on hemodialysis ([address])    SOB (shortness of breath)    Acute respiratory distress    Acute on chronic respiratory failure with hypoxia ([address])       Fernando Wilder was seen today for shortness of breath. Diagnoses and all orders for this visit:    Acute on chronic respiratory failure with hypoxia ([address])    -Patient is acutely hypoxic saturating 85 to 87% on 5 L nasal cannula oxygen at home currently. -She missed her dialysis session on Tuesday because she is unable to leave her home secondary to shortness of breath.  -Patient was advised that she needs to get herself to the nearest hospital ASAP. -Advised that she needs urgent dialysis after missing her session earlier this week she is likely retaining fluid which is contributing to her current hypoxic status. Was advised of the risks of not going to the hospital including worsening hypoxia, respiratory failure, cardiovascular distress, and possible death. She was agreeable and states she will be calling her brother to help with arrangements. Offered to call EMS for the patient and she declined stating her brother will help her out. Educational materials and/or home exercises for patient's review were included in patient instructions on his/her After Visit Summary and sent to patient at the end of visit. Counseled regarding above diagnosis, including possible risks and complications, especially if left uncontrolled. Counseled regarding the possible side effects, risks, benefits and alternatives to treatment; patient and/or guardian verbalizes understanding, agrees, feels comfortable with and wishes to proceed with above treatment plan. Advised patient to call [facility] [employer] new medication issues, and read all Rx info from pharmacy to assure aware of all possible risks and side effects of any medication before taking. Patient verbalizes understanding and agrees with above counseling, assessment and plan.      All questions

## 2023-08-31 ENCOUNTER — HOSPITAL ENCOUNTER (EMERGENCY)
Age: 38
Discharge: HOME | End: 2023-09-01
Attending: EMERGENCY MEDICINE
Payer: MEDICARE

## 2023-08-31 ENCOUNTER — APPOINTMENT (OUTPATIENT)
Dept: GENERAL RADIOLOGY | Age: 38
End: 2023-08-31
Payer: MEDICARE

## 2023-08-31 DIAGNOSIS — R73.9 HYPERGLYCEMIA: ICD-10-CM

## 2023-08-31 DIAGNOSIS — R06.02 SHORTNESS OF BREATH: Primary | ICD-10-CM

## 2023-08-31 LAB
B PARAP IS1001 DNA NPH QL NAA+NON-PROBE: NOT DETECTED
B PERT DNA SPEC QL NAA+PROBE: NOT DETECTED
BASOPHILS # BLD: 0.02 K/UL (ref 0–0.2)
BASOPHILS NFR BLD: 0 % (ref 0–2)
C PNEUM DNA NPH QL NAA+NON-PROBE: NOT DETECTED
EOSINOPHIL # BLD: 0.09 K/UL (ref 0.05–0.5)
EOSINOPHILS RELATIVE PERCENT: 2 % (ref 0–6)
ERYTHROCYTE [DISTWIDTH] IN BLOOD BY AUTOMATED COUNT: 19.7 % (ref 11.5–15)
FLUAV RNA NPH QL NAA+NON-PROBE: NOT DETECTED
FLUBV RNA NPH QL NAA+NON-PROBE: NOT DETECTED
GLUCOSE BLD-MCNC: >500 MG/DL (ref 74–99)
HADV DNA NPH QL NAA+NON-PROBE: NOT DETECTED
HCOV 229E RNA NPH QL NAA+NON-PROBE: NOT DETECTED
HCOV HKU1 RNA NPH QL NAA+NON-PROBE: NOT DETECTED
HCOV NL63 RNA NPH QL NAA+NON-PROBE: NOT DETECTED
HCOV OC43 RNA NPH QL NAA+NON-PROBE: NOT DETECTED
HCT VFR BLD AUTO: 22.7 % (ref 34–48)
HGB BLD-MCNC: 7.4 G/DL (ref 11.5–15.5)
HMPV RNA NPH QL NAA+NON-PROBE: NOT DETECTED
HPIV1 RNA NPH QL NAA+NON-PROBE: NOT DETECTED
HPIV2 RNA NPH QL NAA+NON-PROBE: NOT DETECTED
HPIV3 RNA NPH QL NAA+NON-PROBE: NOT DETECTED
HPIV4 RNA NPH QL NAA+NON-PROBE: NOT DETECTED
IMM GRANULOCYTES # BLD AUTO: <0.03 K/UL (ref 0–0.58)
IMM GRANULOCYTES NFR BLD: 0 % (ref 0–5)
LYMPHOCYTES NFR BLD: 0.67 K/UL (ref 1.5–4)
LYMPHOCYTES RELATIVE PERCENT: 13 % (ref 20–42)
M PNEUMO DNA NPH QL NAA+NON-PROBE: NOT DETECTED
MCH RBC QN AUTO: 33.3 PG (ref 26–35)
MCHC RBC AUTO-ENTMCNC: 32.6 G/DL (ref 32–34.5)
MCV RBC AUTO: 102.3 FL (ref 80–99.9)
MONOCYTES NFR BLD: 0.32 K/UL (ref 0.1–0.95)
MONOCYTES NFR BLD: 6 % (ref 2–12)
NEUTROPHILS NFR BLD: 78 % (ref 43–80)
NEUTS SEG NFR BLD: 3.91 K/UL (ref 1.8–7.3)
PLATELET # BLD AUTO: 199 K/UL (ref 130–450)
PMV BLD AUTO: 10.1 FL (ref 7–12)
RBC # BLD AUTO: 2.22 M/UL (ref 3.5–5.5)
RSV RNA NPH QL NAA+NON-PROBE: NOT DETECTED
RV+EV RNA NPH QL NAA+NON-PROBE: NOT DETECTED
SARS-COV-2 RNA NPH QL NAA+NON-PROBE: NOT DETECTED
SPECIMEN DESCRIPTION: NORMAL
WBC OTHER # BLD: 5 K/UL (ref 4.5–11.5)

## 2023-08-31 PROCEDURE — 93005 ELECTROCARDIOGRAM TRACING: CPT | Performed by: EMERGENCY MEDICINE

## 2023-08-31 PROCEDURE — 80053 COMPREHEN METABOLIC PANEL: CPT

## 2023-08-31 PROCEDURE — 71045 X-RAY EXAM CHEST 1 VIEW: CPT

## 2023-08-31 PROCEDURE — 82962 GLUCOSE BLOOD TEST: CPT

## 2023-08-31 PROCEDURE — 85025 COMPLETE CBC W/AUTO DIFF WBC: CPT

## 2023-08-31 PROCEDURE — 0202U NFCT DS 22 TRGT SARS-COV-2: CPT

## 2023-08-31 PROCEDURE — 99285 EMERGENCY DEPT VISIT HI MDM: CPT | Performed by: EMERGENCY MEDICINE

## 2023-08-31 PROCEDURE — 96376 TX/PRO/DX INJ SAME DRUG ADON: CPT | Performed by: EMERGENCY MEDICINE

## 2023-08-31 PROCEDURE — 96374 THER/PROPH/DIAG INJ IV PUSH: CPT | Performed by: EMERGENCY MEDICINE

## 2023-08-31 ASSESSMENT — LIFESTYLE VARIABLES
HOW OFTEN DO YOU HAVE A DRINK CONTAINING ALCOHOL: NEVER
HOW MANY STANDARD DRINKS CONTAINING ALCOHOL DO YOU HAVE ON A TYPICAL DAY: PATIENT DOES NOT DRINK

## 2023-09-01 VITALS
TEMPERATURE: 99.5 F | RESPIRATION RATE: 23 BRPM | DIASTOLIC BLOOD PRESSURE: 78 MMHG | WEIGHT: 145 LBS | OXYGEN SATURATION: 95 % | SYSTOLIC BLOOD PRESSURE: 123 MMHG | HEART RATE: 98 BPM | BODY MASS INDEX: 29.29 KG/M2

## 2023-09-01 LAB
ALBUMIN SERPL-MCNC: 3.6 G/DL (ref 3.5–5.2)
ALBUMIN SERPL-MCNC: 3.6 G/DL (ref 3.5–5.2)
ALP SERPL-CCNC: 110 U/L (ref 35–104)
ALP SERPL-CCNC: 118 U/L (ref 35–104)
ALT SERPL-CCNC: 13 U/L (ref 0–32)
ALT SERPL-CCNC: 14 U/L (ref 0–32)
ANION GAP SERPL CALCULATED.3IONS-SCNC: 21 MMOL/L (ref 7–16)
ANION GAP SERPL CALCULATED.3IONS-SCNC: 23 MMOL/L (ref 7–16)
AST SERPL-CCNC: 13 U/L (ref 0–31)
AST SERPL-CCNC: 16 U/L (ref 0–31)
BILIRUB SERPL-MCNC: 0.3 MG/DL (ref 0–1.2)
BILIRUB SERPL-MCNC: 0.4 MG/DL (ref 0–1.2)
BUN SERPL-MCNC: 39 MG/DL (ref 6–20)
BUN SERPL-MCNC: 41 MG/DL (ref 6–20)
CALCIUM SERPL-MCNC: 8.6 MG/DL (ref 8.6–10.2)
CALCIUM SERPL-MCNC: 8.6 MG/DL (ref 8.6–10.2)
CHLORIDE SERPL-SCNC: 86 MMOL/L (ref 98–107)
CHLORIDE SERPL-SCNC: 88 MMOL/L (ref 98–107)
CO2 SERPL-SCNC: 18 MMOL/L (ref 22–29)
CO2 SERPL-SCNC: 20 MMOL/L (ref 22–29)
CREAT SERPL-MCNC: 5.4 MG/DL (ref 0.5–1)
CREAT SERPL-MCNC: 5.7 MG/DL (ref 0.5–1)
EKG ATRIAL RATE: 98 BPM
EKG P AXIS: 67 DEGREES
EKG P-R INTERVAL: 122 MS
EKG Q-T INTERVAL: 350 MS
EKG QRS DURATION: 70 MS
EKG QTC CALCULATION (BAZETT): 446 MS
EKG R AXIS: 60 DEGREES
EKG T AXIS: 96 DEGREES
EKG VENTRICULAR RATE: 98 BPM
GFR SERPL CREATININE-BSD FRML MDRD: 10 ML/MIN/1.73M2
GFR SERPL CREATININE-BSD FRML MDRD: 9 ML/MIN/1.73M2
GLUCOSE BLD-MCNC: 375 MG/DL (ref 74–99)
GLUCOSE BLD-MCNC: 379 MG/DL (ref 74–99)
GLUCOSE SERPL-MCNC: 666 MG/DL (ref 74–99)
GLUCOSE SERPL-MCNC: 771 MG/DL (ref 74–99)
POTASSIUM SERPL-SCNC: 3.8 MMOL/L (ref 3.5–5)
POTASSIUM SERPL-SCNC: 4.9 MMOL/L (ref 3.5–5)
PROT SERPL-MCNC: 7.2 G/DL (ref 6.4–8.3)
PROT SERPL-MCNC: 7.6 G/DL (ref 6.4–8.3)
SODIUM SERPL-SCNC: 127 MMOL/L (ref 132–146)
SODIUM SERPL-SCNC: 129 MMOL/L (ref 132–146)
TROPONIN I SERPL HS-MCNC: 204 NG/L (ref 0–9)

## 2023-09-01 PROCEDURE — 93010 ELECTROCARDIOGRAM REPORT: CPT | Performed by: INTERNAL MEDICINE

## 2023-09-01 PROCEDURE — 82962 GLUCOSE BLOOD TEST: CPT

## 2023-09-01 PROCEDURE — 80053 COMPREHEN METABOLIC PANEL: CPT

## 2023-09-01 PROCEDURE — 6370000000 HC RX 637 (ALT 250 FOR IP): Performed by: EMERGENCY MEDICINE

## 2023-09-01 PROCEDURE — 84484 ASSAY OF TROPONIN QUANT: CPT

## 2023-09-01 RX ADMIN — INSULIN HUMAN 5 UNITS: 100 INJECTION, SOLUTION PARENTERAL at 04:27

## 2023-09-01 RX ADMIN — INSULIN HUMAN 8 UNITS: 100 INJECTION, SOLUTION PARENTERAL at 01:00

## 2023-09-01 RX ADMIN — INSULIN HUMAN 10 UNITS: 100 INJECTION, SOLUTION PARENTERAL at 03:04

## 2023-09-01 ASSESSMENT — PAIN - FUNCTIONAL ASSESSMENT: PAIN_FUNCTIONAL_ASSESSMENT: NONE - DENIES PAIN

## 2023-09-01 NOTE — ED PROVIDER NOTES
HPI:  23, Time: 8:09 PM EDT         Nikko Navarro is a 43 y.o. female history of acute kidney injury history of chronic kidney disease history of diabetes history of hypertension history of tobacco use presenting to the ED for shortness of breath, beginning several days ago. The complaint has been persistent, moderate in severity, and worsened by nothing. Patient reports feeling short of breath for the last several days. Patient reports symptoms started on the weekend. Patient does wear oxygen at home and normally wears 5 L. Patient reports that she gets dialysis on Tuesday because she was not feeling well she reports no productive cough she reports no fever chills she reports no chest pain or abdominal pain. Patient reporting no headache she reports no syncopal event. Patient reports she did have dialysis today. Patient reporting no black or tarry stools she reports no hematemesis    ROS:   Pertinent positives and negatives are stated within HPI, all other systems reviewed and are negative.  --------------------------------------------- PAST HISTORY ---------------------------------------------  Past Medical History:  has a past medical history of JOLLY (acute kidney injury) ([address]), AVF (arteriovenous fistula) ([address]), Chronic kidney disease, Dialysis AV fistula malfunction, initial encounter ([address]), DM type 1 (diabetes mellitus, type 1) ([address]), Encounter regarding vascular access for dialysis for ESRD ([address]), ESRD (end stage renal disease) ([address]), Hemodialysis patient ([address]), Hypertension, and Tobacco abuse. Past Surgical History:  has a past surgical history that includes Dilation and curettage of uterus (); Breast cyst incision and drainage (2011); other surgical history (); fracture surgery (); other surgical history (2016); other surgical history (2016); other surgical history (Left, 2017);   section (2013); pr insj non-tunneled central for this patient. Results are listed below. LABS:  Results for orders placed or performed during the hospital encounter of 08/31/23   Respiratory Panel, Molecular, with COVID-19 (Restricted: peds pts or suitable admitted adults)    Specimen: Nasopharyngeal Swab   Result Value Ref Range    Specimen Description . NASOPHARYNGEAL SWAB     Adenovirus PCR Not Detected Not Detected    Coronavirus 229E PCR Not Detected Not Detected    Coronavirus HKU1 PCR Not Detected Not Detected    Coronavirus NL63 PCR Not Detected Not Detected    Coronavirus OC43 PCR Not Detected Not Detected    SARS-CoV-2, PCR Not Detected Not Detected    Human Metapneumovirus PCR Not Detected Not Detected    Rhino/Enterovirus PCR Not Detected Not Detected    Influenza A by PCR Not Detected Not Detected    Influenza B by PCR Not Detected Not Detected    Parainfluenza 1 PCR Not Detected Not Detected    Parainfluenza 2 PCR Not Detected Not Detected    Parainfluenza 3 PCR Not Detected Not Detected    Parainfluenza 4 PCR Not Detected Not Detected    Resp Syncytial Virus PCR Not Detected Not Detected    Bordetella parapertussis by PCR Not Detected Not Detected    B Pertussis by PCR Not Detected Not Detected    Chlamydia pneumoniae By PCR Not Detected Not Detected    Mycoplasma pneumo by PCR Not Detected Not Detected   CBC with Auto Differential   Result Value Ref Range    WBC 5.0 4.5 - 11.5 k/uL    RBC 2.22 (L) 3.50 - 5.50 m/uL    Hemoglobin 7.4 (L) 11.5 - 15.5 g/dL    Hematocrit 22.7 (L) 34.0 - 48.0 %    .3 (H) 80.0 - 99.9 fL    MCH 33.3 26.0 - 35.0 pg    MCHC 32.6 32.0 - 34.5 g/dL    RDW 19.7 (H) 11.5 - 15.0 %    Platelets 581 777 - 174 k/uL    MPV 10.1 7.0 - 12.0 fL    Neutrophils % 78 43.0 - 80.0 %    Lymphocytes % 13 (L) 20.0 - 42.0 %    Monocytes % 6 2.0 - 12.0 %    Eosinophils % 2 0 - 6 %    Basophils % 0 0.0 - 2.0 %    Immature Granulocytes 0 0.0 - 5.0 %    Neutrophils Absolute 3.91 1.80 - 7.30 k/uL    Lymphocytes Absolute 0.67 (L) 1.50 -

## 2023-09-01 NOTE — ED NOTES
PT requesting to leave \"AMA. \"  Plan of care explained to pt. Benefits of staying and risks of leaving. PT verbalized understanding and requested d/c papers and AMA papers. Pt a&ox4, talking with clear speech, walks with steady gait, denies SI/HI. AMA papers + d/c papers explained to pt. PT verbalized understanding and signed paperwork. Attempted to set up PAS for pt r/t o2 needs pt stating \"I will get my own taxi ride to my truck because I need my truck and it has my o2. My truck is just down the street at [facility] on [address]. \"  Explained risks of traveling w/o o2 and benefits of transferring home via PAS. PT verbalized understanding stating \"I'll be fine, its just down the road. I need my truck. I have my keys. \" Taxi called; pt remaining in room on o2 until taxi arrival.     Deepika Cordoba  09/01/23 0600

## 2023-09-20 RX ORDER — PSEUDOEPHED/ACETAMINOPH/DIPHEN 30MG-500MG
TABLET ORAL
Qty: 120 TABLET | Refills: 0 | Status: SHIPPED | OUTPATIENT
Start: 2023-09-20

## 2023-09-20 NOTE — TELEPHONE ENCOUNTER
Last Appointment:  8/30/2023  Future Appointments   Date Time Provider [address]   11/6/2023 10:45 AM Kartik Vazquez MD [facility]   11/20/2023 12:30 PM KEMI Benedict - CNP [facility]   11/29/2023 11:00 AM Pedro Koo DO [facility]   1/3/2024  1:30 PM Arnaldo Figueroa MD [facility]

## 2023-10-01 DIAGNOSIS — R06.02 SOB (SHORTNESS OF BREATH): ICD-10-CM

## 2023-10-02 NOTE — TELEPHONE ENCOUNTER
Last Appointment:  8/30/2023  Future Appointments   Date Time Provider [address]   11/6/2023 11:00 AM Rocky Mantilla MD [facility]   11/20/2023 12:30 PM KEMI Noel - CNP [facility]   11/29/2023 11:00 AM Magi Javier, DO [facility] [facility]   1/3/2024  1:30 PM Anupama Joseph MD [facility]

## 2023-10-03 RX ORDER — ALBUTEROL SULFATE 2.5 MG/3ML
SOLUTION RESPIRATORY (INHALATION)
Qty: 90 ML | OUTPATIENT
Start: 2023-10-03

## 2023-10-04 ENCOUNTER — PATIENT MESSAGE (OUTPATIENT)
Dept: ENDOCRINOLOGY | Age: 38
End: 2023-10-04

## 2023-10-05 DIAGNOSIS — K21.9 GASTROESOPHAGEAL REFLUX DISEASE WITHOUT ESOPHAGITIS: ICD-10-CM

## 2023-10-05 DIAGNOSIS — N18.6 TYPE 1 DIABETES MELLITUS WITH CHRONIC KIDNEY DISEASE ON CHRONIC DIALYSIS (HCC): Primary | ICD-10-CM

## 2023-10-05 DIAGNOSIS — E10.22 TYPE 1 DIABETES MELLITUS WITH CHRONIC KIDNEY DISEASE ON CHRONIC DIALYSIS (HCC): Primary | ICD-10-CM

## 2023-10-05 DIAGNOSIS — Z99.2 TYPE 1 DIABETES MELLITUS WITH CHRONIC KIDNEY DISEASE ON CHRONIC DIALYSIS (HCC): Primary | ICD-10-CM

## 2023-10-05 RX ORDER — FAMOTIDINE 20 MG/1
20 TABLET, FILM COATED ORAL EVERY MORNING
Qty: 30 TABLET | Refills: 3 | Status: SHIPPED | OUTPATIENT
Start: 2023-10-05

## 2023-10-05 RX ORDER — PROCHLORPERAZINE 25 MG/1
SUPPOSITORY RECTAL
Qty: 9 EACH | Refills: 5 | Status: SHIPPED | OUTPATIENT
Start: 2023-10-05

## 2023-10-05 RX ORDER — PROCHLORPERAZINE 25 MG/1
SUPPOSITORY RECTAL
Qty: 1 EACH | Refills: 5 | Status: SHIPPED | OUTPATIENT
Start: 2023-10-05

## 2023-10-05 NOTE — TELEPHONE ENCOUNTER
Last Appointment:  8/30/2023  Future Appointments   Date Time Provider [address]   11/6/2023 11:00 AM Ky Ross MD [facility]   11/20/2023 12:30 PM KEMI Izaguirre - CNP [facility]   11/29/2023 11:00 AM Antonia Sheppard, DO [facility]   1/3/2024  1:30 PM Gold Dyer MD [facility]

## 2023-10-09 NOTE — TELEPHONE ENCOUNTER
Last Appointment:  8/30/2023  Future Appointments   Date Time Provider [address]   11/6/2023 11:00 AM Art Chen MD [facility]   11/20/2023 12:30 PM KEMI Tinsley - CNP [facility]   11/29/2023 11:00 AM Ofelia Ann, [facility]   1/3/2024  1:30 PM Gilbert Dyer MD [facility]

## 2023-10-09 NOTE — TELEPHONE ENCOUNTER
Looks as if resolved 10/5       From: Comfort Maria  To: Dr. Lyssa Zavala: 10/4/2023  9:15 AM EDT  Subject: Prescription concerns     I need a refill prescription on my Dexcom G6 transmitter so I can be able to change the device meaning my sensor as well also I need test strips to be able to check my blood sugars when something Karthik Cuff this happens I use the ONE TOUCH Verio Test Strips that's the machine I have.  [facility] there number is [phone], thank you so much

## 2023-10-10 RX ORDER — GLUCOSAMINE HCL/CHONDROITIN SU 500-400 MG
CAPSULE ORAL
Qty: 200 STRIP | Refills: 5 | Status: SHIPPED | OUTPATIENT
Start: 2023-10-10

## 2023-10-11 NOTE — DISCHARGE SUMMARY
[facility] Physician Discharge Summary     Barry Mantilla, APRN - CNP  [address], [place] [id number] [phone]    Schedule an appointment as soon as possible for a visit      Activity level   As tolerated    Disposition   Home      Condition on discharge Stable    Patient ID   Emma Benson, 38 y. o.female /  1985  / MRN [id number]    Admit date   2020    Discharge date  2020  5:52 PM    Admission diagnoses Active Problems:    Anemia  Resolved Problems:    * No resolved hospital problems. *    Discharge diagnoses Same    Consults   IP CONSULT TO ONCOLOGY  IP CONSULT TO NEPHROLOGY    Procedures   See hospital course    Hospital Course  Patient is a 67-year-old female with past medical history pertinent for end-stage renal disease on hemodialysis, type 1 diabetes, hypertension, and tobacco abuse who had originally presented to the ER yesterday, but subsequently signed out [address] as she had no one to care for her child if she were admitted. She reported back to the ER this morning. Noting fatigue for the past 1 to 2 weeks, no abdominal pain, no lightheadedness, dizziness. No obvious melena or hematochezia. #1  Acute on chronic anemia, etiology unclear: Baseline hemoglobin appears to be somewhere between 10 and 11 g/dL, was found to be 6.9 in the ER yesterday. Patient was transfused 1 unit, currently up to 8.5. Indices show normal MCV and MCHC. Stool was negative for occult blood. Iron studies unremarkable. Checking vitamin B12, folate, PTH.   Hematology consulted - checking blood smear, retics, SPEP, LDH, hapto, f/u outpt and ok for dc from their standpoint as well as nephro.       #2 ESRD on hemodialysis: Nephrology consultation for HD needs     #3 type 1 diabetes: Basal bolus insulin with sliding scale AC at bedtime, hemoglobin A1c shows a 81 MG capsule  Commonly known as:  TESSALON  Take 1 capsule by mouth 3 times daily as needed for Cough     calcium acetate 667 MG capsule  Commonly known as:  PHOSLO  Take 2 capsules by mouth 3 times daily (with meals)     carvedilol 25 MG tablet  Commonly known as:  COREG     famotidine 20 MG tablet  Commonly known as:  PEPCID  Take 1 tablet by mouth every morning     FREESTYLE LITE strip  Generic drug:  blood glucose test strips     glucose 4 g chewable tablet  Take 1 tablet by mouth as needed for Low blood sugar     ibuprofen 800 MG tablet  Commonly known as:  ADVIL;MOTRIN     insulin glargine 300 UNIT/ML injection pen  Commonly known as:  TOUJEO SOLOSTAR  Inject 16 Units into the skin nightly     insulin lispro (1 Unit Dial) 100 UNIT/ML Sopn  Commonly known as:  HumaLOG KwikPen  Inject 6 Units into the skin 3 times daily (before meals)     Insulin Syringe-Needle U-100 31G X 5/16\" 1 ML Misc  Commonly known as:  [employer] Insulin Syringe  1 each by Does not apply route daily     latanoprost 0.005 % ophthalmic solution  Commonly known as:  XALATAN     medroxyPROGESTERone 150 MG/ML injection  Commonly known as:  DEPO-PROVERA     Probiotic Acidophilus Caps     magy-mitzi Tabs  Take 1 tablet by mouth Daily with supper     vitamin B-12 100 MCG tablet  Commonly known as:  CYANOCOBALAMIN     Vitamin D3 50 MCG (2000 UT) Caps  Take 1 capsule by mouth every morning LD 8/22/208          Note that more than 30 minutes was spent in preparing discharge papers, discussing discharge with patient, medication review, etc.    Electronically signed by Radames Wyatt DO on 7/14/2020 at 5:52 PM

## 2023-10-26 ENCOUNTER — PATIENT MESSAGE (OUTPATIENT)
Dept: FAMILY MEDICINE CLINIC | Age: 38
End: 2023-10-26

## 2023-10-26 RX ORDER — PSEUDOEPHED/ACETAMINOPH/DIPHEN 30MG-500MG
TABLET ORAL
Qty: 120 TABLET | Refills: 0 | Status: SHIPPED | OUTPATIENT
Start: 2023-10-26

## 2023-10-26 NOTE — TELEPHONE ENCOUNTER
Last Appointment:  8/30/2023  Future Appointments   Date Time Provider [address]   11/6/2023 11:00 AM Lulu Sousa MD [facility]   11/20/2023 12:30 PM KEMI Lewis - CNP [facility]   11/29/2023 11:00 AM Fam Mccray, [facility]   1/3/2024  1:30 PM Matt Dyer MD [facility]

## 2023-10-27 DIAGNOSIS — L30.9 ECZEMA, UNSPECIFIED TYPE: ICD-10-CM

## 2023-10-27 RX ORDER — CLINDAMYCIN PHOSPHATE 10 UG/ML
LOTION TOPICAL
Qty: 60 G | Refills: 2 | Status: SHIPPED | OUTPATIENT
Start: 2023-10-27 | End: 2023-11-26

## 2023-10-27 RX ORDER — INSULIN LISPRO 100 [IU]/ML
6 INJECTION, SOLUTION INTRAVENOUS; SUBCUTANEOUS
Qty: 3 ADJUSTABLE DOSE PRE-FILLED PEN SYRINGE | Refills: 3 | Status: SHIPPED | OUTPATIENT
Start: 2023-10-27

## 2023-10-27 NOTE — TELEPHONE ENCOUNTER
Pt is also requesting Clindamycin 1% lotion    Last Appointment:  8/30/2023  Future Appointments   Date Time Provider [address]   11/6/2023 11:00 AM Derian Arroyo MD [facility]   11/20/2023 12:30 PM KEMI Msihra - CNP [facility]   11/29/2023 11:00 AM Abi Negrete DO [facility] [facility]   1/3/2024  1:30 PM William Dyer MD [facility]

## 2023-10-27 NOTE — TELEPHONE ENCOUNTER
From: Melissa Cazares  To: Dr. Alex Perry: 10/26/2023 10:16 PM EDT  Subject: Prescription Question     I need a refill on my prescription for Clindamycin Phosphate Tropical Lotion ( Clindamycin Phosphate Tropical Suspension 1%) the pharmacy I use is [facility] [phone] also I am in need of a health care provider to help me at home as well need to know if there's something you maybe able to see about

## 2023-11-07 LAB
ESTIMATED AVERAGE GLUCOSE: NORMAL
HBA1C MFR BLD: 6.6 %

## 2023-11-17 ENCOUNTER — TELEMEDICINE (OUTPATIENT)
Dept: FAMILY MEDICINE CLINIC | Age: 38
End: 2023-11-17
Payer: MEDICARE

## 2023-11-17 DIAGNOSIS — Z99.2 TYPE 1 DIABETES MELLITUS WITH CHRONIC KIDNEY DISEASE ON CHRONIC DIALYSIS (HCC): ICD-10-CM

## 2023-11-17 DIAGNOSIS — Z99.81 CHRONIC HYPOXIC RESPIRATORY FAILURE, ON HOME OXYGEN THERAPY (HCC): Primary | ICD-10-CM

## 2023-11-17 DIAGNOSIS — N18.6 ESRD ON HEMODIALYSIS (HCC): ICD-10-CM

## 2023-11-17 DIAGNOSIS — E10.22 TYPE 1 DIABETES MELLITUS WITH CHRONIC KIDNEY DISEASE ON CHRONIC DIALYSIS (HCC): ICD-10-CM

## 2023-11-17 DIAGNOSIS — J96.11 CHRONIC HYPOXIC RESPIRATORY FAILURE, ON HOME OXYGEN THERAPY (HCC): Primary | ICD-10-CM

## 2023-11-17 DIAGNOSIS — Z99.2 ESRD ON HEMODIALYSIS (HCC): ICD-10-CM

## 2023-11-17 DIAGNOSIS — I50.22 CHRONIC SYSTOLIC (CONGESTIVE) HEART FAILURE (HCC): ICD-10-CM

## 2023-11-17 DIAGNOSIS — N18.6 TYPE 1 DIABETES MELLITUS WITH CHRONIC KIDNEY DISEASE ON CHRONIC DIALYSIS (HCC): ICD-10-CM

## 2023-11-17 PROBLEM — L02.91 ABSCESS: Status: RESOLVED | Noted: 2020-03-17 | Resolved: 2023-11-17

## 2023-11-17 PROBLEM — R06.03 ACUTE RESPIRATORY DISTRESS: Status: RESOLVED | Noted: 2023-07-03 | Resolved: 2023-11-17

## 2023-11-17 PROBLEM — K04.7 DENTAL ABSCESS: Status: RESOLVED | Noted: 2022-03-18 | Resolved: 2023-11-17

## 2023-11-17 PROBLEM — J96.21 ACUTE ON CHRONIC RESPIRATORY FAILURE WITH HYPOXIA (HCC): Status: RESOLVED | Noted: 2023-08-30 | Resolved: 2023-11-17

## 2023-11-17 PROBLEM — U07.1 COVID-19: Status: RESOLVED | Noted: 2023-03-22 | Resolved: 2023-11-17

## 2023-11-17 PROBLEM — R05.8 DRY COUGH: Status: RESOLVED | Noted: 2020-03-17 | Resolved: 2023-11-17

## 2023-11-17 PROBLEM — R79.89 ELEVATED PROCALCITONIN: Status: RESOLVED | Noted: 2023-03-22 | Resolved: 2023-11-17

## 2023-11-17 PROBLEM — R07.89 CHEST PRESSURE: Status: RESOLVED | Noted: 2023-03-22 | Resolved: 2023-11-17

## 2023-11-17 PROCEDURE — G8417 CALC BMI ABV UP PARAM F/U: HCPCS | Performed by: INTERNAL MEDICINE

## 2023-11-17 PROCEDURE — G8428 CUR MEDS NOT DOCUMENT: HCPCS | Performed by: INTERNAL MEDICINE

## 2023-11-17 PROCEDURE — 2022F DILAT RTA XM EVC RTNOPTHY: CPT | Performed by: INTERNAL MEDICINE

## 2023-11-17 PROCEDURE — 99214 OFFICE O/P EST MOD 30 MIN: CPT | Performed by: INTERNAL MEDICINE

## 2023-11-17 PROCEDURE — 4004F PT TOBACCO SCREEN RCVD TLK: CPT | Performed by: INTERNAL MEDICINE

## 2023-11-17 PROCEDURE — 3052F HG A1C>EQUAL 8.0%<EQUAL 9.0%: CPT | Performed by: INTERNAL MEDICINE

## 2023-11-17 PROCEDURE — G8482 FLU IMMUNIZE ORDER/ADMIN: HCPCS | Performed by: INTERNAL MEDICINE

## 2023-11-17 NOTE — PROGRESS NOTES
unspecified    Nasal congestion    Hidradenitis suppurativa    Symptomatic anemia    Anemia    Gall stone    Metabolic encephalopathy    Hypoxia    Hypotension    History of venous thromboembolism    Chronic systolic (congestive) heart failure    MRSA colonization    ESRD on hemodialysis (HCC)    SOB (shortness of breath)       Bobie Bernheim was seen today for diabetes, congestive heart failure and chronic respiratory failure. Diagnoses and all orders for this visit:    Chronic hypoxic respiratory failure, on home oxygen therapy (HCC)  -Chronic, stable, currently on her 5 L nasal cannula oxygen without any evidence of respiratory distress  -Follow-up in a couple weeks for her scheduled appointment  -     External Referral To Home [facility]    Chronic systolic (congestive) heart failure  -     External Referral To [facility], continue carvedilol, hydralazine    ESRD on hemodialysis [facility])  -     External Referral To [facility], remains compliant with dialysis and has not missed any sessions recently. His schedule is getting changed next week due to the holiday but she has made it work. Follow-up in the next couple weeks as scheduled. Type 1 diabetes mellitus with chronic kidney disease on chronic dialysis ([facility])  -     Insulin Pen Needle 32G X 4 MM MISC; 1 each by Does not apply route 5 (five) times a day  -     External Referral To [facility], reports that she is going to have to cancel her appointment with endocrinology as her dialysis schedule got changed for next week due to the Thanksgiving holiday. She is needing a refill on her insulin pen needles but should have a refill of her insulin at the pharmacy for her.  -Continue on Toujeo 14 units at night and Humalog 6 units with meals, glucagon on hand for hypoglycemic episodes  -Follow-up in a couple weeks for her scheduled diabetes follow-up appointment.         Educational materials and/or home exercises for patient's review were

## 2023-11-22 RX ORDER — INSULIN LISPRO 100 [IU]/ML
6 INJECTION, SOLUTION INTRAVENOUS; SUBCUTANEOUS
Qty: 15 ADJUSTABLE DOSE PRE-FILLED PEN SYRINGE | Refills: 3 | Status: SHIPPED | OUTPATIENT
Start: 2023-11-22

## 2023-11-24 DIAGNOSIS — R05.8 PRODUCTIVE COUGH: ICD-10-CM

## 2023-11-28 RX ORDER — PSEUDOEPHED/ACETAMINOPH/DIPHEN 30MG-500MG
TABLET ORAL
Qty: 120 TABLET | Refills: 0 | Status: SHIPPED
Start: 2023-11-28 | End: 2023-12-28

## 2023-11-28 RX ORDER — CODEINE PHOSPHATE AND GUAIFENESIN 10; 100 MG/5ML; MG/5ML
SOLUTION ORAL
Qty: 180 ML | OUTPATIENT
Start: 2023-11-28

## 2023-11-28 RX ORDER — CODEINE PHOSPHATE/GUAIFENESIN 10-100MG/5
5 LIQUID (ML) ORAL 2 TIMES DAILY PRN
Refills: 0 | OUTPATIENT
Start: 2023-11-28 | End: 2023-12-01

## 2023-11-28 NOTE — TELEPHONE ENCOUNTER
Last Appointment:  11/17/2023  Future Appointments   Date Time Provider Department Center   11/29/2023 11:00 AM May Waters DO [facility]   12/4/2023  1:45 PM Haylee Zaidi MD VAS/MED [facility]   1/3/2024  1:30 PM Brennan Dyer MD BD ENDO [facility]

## 2023-12-04 ENCOUNTER — OFFICE VISIT (OUTPATIENT)
Dept: VASCULAR SURGERY | Age: 38
End: 2023-12-04
Payer: MEDICARE

## 2023-12-04 DIAGNOSIS — N18.6 ENCOUNTER REGARDING VASCULAR ACCESS FOR DIALYSIS FOR ESRD (HCC): Primary | ICD-10-CM

## 2023-12-04 DIAGNOSIS — Z99.2 ENCOUNTER REGARDING VASCULAR ACCESS FOR DIALYSIS FOR ESRD (HCC): Primary | ICD-10-CM

## 2023-12-04 PROCEDURE — G8482 FLU IMMUNIZE ORDER/ADMIN: HCPCS | Performed by: NURSE PRACTITIONER

## 2023-12-04 PROCEDURE — G8427 DOCREV CUR MEDS BY ELIG CLIN: HCPCS | Performed by: NURSE PRACTITIONER

## 2023-12-04 PROCEDURE — 4004F PT TOBACCO SCREEN RCVD TLK: CPT | Performed by: NURSE PRACTITIONER

## 2023-12-04 PROCEDURE — G8417 CALC BMI ABV UP PARAM F/U: HCPCS | Performed by: NURSE PRACTITIONER

## 2023-12-04 PROCEDURE — 99213 OFFICE O/P EST LOW 20 MIN: CPT | Performed by: NURSE PRACTITIONER

## 2023-12-04 NOTE — PROGRESS NOTES
Transmit (DEXCOM G6 TRANSMITTER) MISC One transmitter to be used every 90 days 1 each 5    Continuous Blood Gluc Sensor (DEXCOM G6 SENSOR) MISC INJECT 1 INTO SKIN EVERY 10 DAYS FOR 10 DAYS 9 each 5    Glucagon, rDNA, (GLUCAGON EMERGENCY) 1 MG KIT Inject 1 mg as directed as needed (for blood glucose level <50) 1 kit 5    Cyanocobalamin (VITAMIN B12 PO) Take by mouth      Probiotic Product (PROBIOTIC DAILY PO) Take by mouth      GLUTOSE 15 40 % GEL TAKE 32 ML BY MOUTH DAILY AS NEEDED FOR HYPOGLYCEMIA, BLOOD GLUCOSE<70 112.5 g 5    Insulin Glargine, 2 Unit Dial, (TOUJEO MAX SOLOSTAR) 300 UNIT/ML SOPN Inject 10 units in morning and 5 units at bedtime (Patient taking differently: Inject 14 units at bedtime) 1 Adjustable Dose Pre-filled Pen Syringe 1    sodium chloride (OCEAN, BABY AYR) 0.65 % nasal spray 1 spray by Nasal route every 2 hours as needed for Congestion 60 mL 0    Insulin Disposable Pump (OMNIPOD 5 G6 POD, GEN 5,) MISC Change Pods every 3 days 30 each 3    clopidogrel (PLAVIX) 75 MG tablet TAKE 1 TABLET BY MOUTH DAILY 90 tablet 3    carvedilol (COREG) 25 MG tablet Take 2 tablets by mouth every morning (Patient taking differently: Take 2 tablets by mouth every morning Taking one tablet every morning.) 60 tablet 3    calcium acetate (PHOSLO) 667 MG CAPS capsule Take 4 capsules by mouth 3 times daily (with meals)      B Complex-C-Folic Acid (RENAL-JACBO) 0.8 MG TABS Take 1 tablet by mouth daily      albuterol sulfate  (90 Base) MCG/ACT inhaler Inhale 2 puffs into the lungs every 4-6 hours as needed for Wheezing or Shortness of Breath 1 Inhaler 2    hydrALAZINE (APRESOLINE) 50 MG tablet Take 1 tablet by mouth every 8 hours as needed Take only if systolic BP is >795 (Patient not taking: Reported on 12/4/2023) 90 tablet 3     No current facility-administered medications for this visit. Allergies:  Patient has no known allergies.   Social History     Socioeconomic History    Marital status:

## 2023-12-05 LAB
BASOPHILS ABSOLUTE: NORMAL
BASOPHILS RELATIVE PERCENT: NORMAL
BUN BLDV-MCNC: NORMAL MG/DL
CALCIUM SERPL-MCNC: NORMAL MG/DL
CHLORIDE BLD-SCNC: NORMAL MMOL/L
CO2: NORMAL
CREAT SERPL-MCNC: NORMAL MG/DL
EGFR: NORMAL
EOSINOPHILS ABSOLUTE: NORMAL
EOSINOPHILS RELATIVE PERCENT: NORMAL
GLUCOSE BLD-MCNC: NORMAL MG/DL
HCT VFR BLD CALC: NORMAL %
HEMOGLOBIN: NORMAL
LYMPHOCYTES ABSOLUTE: NORMAL
LYMPHOCYTES RELATIVE PERCENT: NORMAL
MCH RBC QN AUTO: NORMAL PG
MCHC RBC AUTO-ENTMCNC: NORMAL G/DL
MCV RBC AUTO: NORMAL FL
MONOCYTES ABSOLUTE: NORMAL
MONOCYTES RELATIVE PERCENT: NORMAL
NEUTROPHILS ABSOLUTE: NORMAL
PLATELET # BLD: NORMAL 10*3/UL
PMV BLD AUTO: NORMAL FL
POTASSIUM SERPL-SCNC: NORMAL MMOL/L
RBC # BLD: NORMAL 10*6/UL
SODIUM BLD-SCNC: NORMAL MMOL/L
VITAMIN D 25-HYDROXY: 0.25
VITAMIN D2, 25 HYDROXY: NORMAL
VITAMIN D3,25 HYDROXY: NORMAL
WBC # BLD: NORMAL 10*3/UL

## 2023-12-06 NOTE — ED NOTES
[facility] faxed, spoke to Sonia Mccullough, confirmation received. Pt to be transported momentarily.      Loki Hercules RN  07/14/20 2038 - at home on Toprol 50mg BID, amlodipine 5mg daily with an additional 5 every other day   - will continue with home regimen at this time

## 2023-12-18 PROBLEM — N18.6 ENCOUNTER REGARDING VASCULAR ACCESS FOR DIALYSIS FOR END-STAGE RENAL DISEASE (HCC): Status: ACTIVE | Noted: 2023-12-18

## 2023-12-18 PROBLEM — Z99.2 ENCOUNTER REGARDING VASCULAR ACCESS FOR DIALYSIS FOR END-STAGE RENAL DISEASE (HCC): Status: ACTIVE | Noted: 2023-12-18

## 2023-12-19 NOTE — TELEPHONE ENCOUNTER
Last Appointment:  11/17/2023  Future Appointments   Date Time Provider [address]   11/29/2023 11:00 AM Dayanna Ybarra [facility]   12/4/2023  1:45 PM Ky Ross MD [facility]   1/3/2024  1:30 PM Gold Dyer MD [facility]
.

## 2023-12-25 DIAGNOSIS — K21.9 GASTROESOPHAGEAL REFLUX DISEASE WITHOUT ESOPHAGITIS: ICD-10-CM

## 2023-12-26 RX ORDER — FAMOTIDINE 20 MG/1
20 TABLET, FILM COATED ORAL EVERY MORNING
Qty: 30 TABLET | Refills: 3 | OUTPATIENT
Start: 2023-12-26

## 2023-12-26 NOTE — TELEPHONE ENCOUNTER
Last Appointment:  11/17/2023  Future Appointments   Date Time Provider [address]   1/3/2024  1:30 PM Pranav Blue MD [facility]   1/8/2024  2:30 PM Charlene Espinoza DO [address] [place] [address]   1/15/2024  1:00 PM Tracy Lloyd MD [facility]

## 2023-12-28 RX ORDER — PSEUDOEPHED/ACETAMINOPH/DIPHEN 30MG-500MG
TABLET ORAL
Qty: 120 TABLET | Refills: 0 | Status: SHIPPED | OUTPATIENT
Start: 2023-12-28

## 2024-01-03 ENCOUNTER — OFFICE VISIT (OUTPATIENT)
Dept: ENDOCRINOLOGY | Age: 39
End: 2024-01-03

## 2024-01-03 VITALS
WEIGHT: 152 LBS | OXYGEN SATURATION: 99 % | HEART RATE: 93 BPM | HEIGHT: 59 IN | RESPIRATION RATE: 18 BRPM | BODY MASS INDEX: 30.64 KG/M2 | DIASTOLIC BLOOD PRESSURE: 66 MMHG | SYSTOLIC BLOOD PRESSURE: 118 MMHG

## 2024-01-03 DIAGNOSIS — N18.6 TYPE 1 DIABETES MELLITUS WITH CHRONIC KIDNEY DISEASE ON CHRONIC DIALYSIS (HCC): ICD-10-CM

## 2024-01-03 DIAGNOSIS — Z91.119 DIETARY NONCOMPLIANCE: ICD-10-CM

## 2024-01-03 DIAGNOSIS — Z99.2 TYPE 1 DIABETES MELLITUS WITH CHRONIC KIDNEY DISEASE ON CHRONIC DIALYSIS (HCC): ICD-10-CM

## 2024-01-03 DIAGNOSIS — E10.69 TYPE 1 DIABETES MELLITUS WITH OTHER SPECIFIED COMPLICATION (HCC): Primary | ICD-10-CM

## 2024-01-03 DIAGNOSIS — E10.22 TYPE 1 DIABETES MELLITUS WITH CHRONIC KIDNEY DISEASE ON CHRONIC DIALYSIS (HCC): ICD-10-CM

## 2024-01-03 RX ORDER — GLUCOSAMINE HCL/CHONDROITIN SU 500-400 MG
CAPSULE ORAL
Qty: 200 STRIP | Refills: 5 | Status: SHIPPED | OUTPATIENT
Start: 2024-01-03

## 2024-01-03 RX ORDER — INSULIN LISPRO 100 [IU]/ML
6 INJECTION, SOLUTION INTRAVENOUS; SUBCUTANEOUS
Qty: 15 ADJUSTABLE DOSE PRE-FILLED PEN SYRINGE | Refills: 3 | Status: SHIPPED
Start: 2024-01-03 | End: 2024-01-06

## 2024-01-03 RX ORDER — INSULIN GLARGINE 300 U/ML
INJECTION, SOLUTION SUBCUTANEOUS
Qty: 1 ADJUSTABLE DOSE PRE-FILLED PEN SYRINGE | Refills: 1 | Status: SHIPPED
Start: 2024-01-03 | End: 2024-01-06

## 2024-01-03 NOTE — PATIENT INSTRUCTIONS
Recommendations for today's visit  Change Lantus to 12 units night ( the night before dialysis take only 8 units of lantus   Decrease Humalog to 4 unit with meals.   Continue same sliding scale     I you have any questions please call Dr. Dyer's office     Brennan Dyer MD  Endocrinologist, [address] [address]   Phone: [phone]  Fax: [phone]  Email: [email or web address]

## 2024-01-03 NOTE — PROGRESS NOTES
[employer] [facility] Department of Endocrinology Diabetes and Metabolism   [address] [place] [id number]   Phone: [phone]  Fax: [phone]    Date of Service: 1/3/2024  Primary Care Physician: May Waters DO  Provider: Brennan Dyer MD    Reason for the visit:  DM type 1     History of Present Illness:  The history is provided by the patient. No  was used. Accuracy of the patient data is excellent.  Michelle Nettles is a very pleasant 38 y.o. female seen today for diabetes management     Michelle Nettles was diagnosed with diabetes at age 5  and currently on Lantus 14 u nightly, Humalog 5u per sliding scale  The patient has been checking blood sugar DEXCOM CGM     The patient has insulin pump at home and still waiting for the clinic to start    Lab Results   Component Value Date/Time    LABA1C 6.6 11/07/2023 12:22 PM    LABA1C 8.1 06/02/2023 04:39 AM    LABA1C 7.8 05/04/2023 08:31 PM     Patient reported hypoglycemic episodes   The patient has been mindful of what has been eating and following diabetes diet    I reviewed current medications and the patient has no issues with diabetes medications  Michelle Nettles is up to date with eye exam and denied any history of diabetic retinopathy   The patient seeing podiatrist every   And also performs  own feet care  ESRD on HD   Macrovascular complications: no CAD, PVD, or Stroke  The patient receives Flushot every year and up to date with the Pneumonia vaccine      PAST MEDICAL HISTORY   Past Medical History:   Diagnosis Date    JOLLY (acute kidney injury) ([facility]) 4/5/2016    AVF (arteriovenous fistula) ([facility]) 02/19/2018    let arm    Chronic kidney disease     Dialysis AV fistula malfunction, initial encounter ([facility]) 11/7/2019    DM type 1 (diabetes mellitus, type 1) ([facility])     Encounter regarding vascular access for dialysis for ESRD ([facility]) 07/12/2018    ESRD (end stage renal

## 2024-01-05 ENCOUNTER — HOSPITAL ENCOUNTER (OUTPATIENT)
Age: 39
Setting detail: OBSERVATION
Discharge: HOME | End: 2024-01-06
Attending: STUDENT IN AN ORGANIZED HEALTH CARE EDUCATION/TRAINING PROGRAM | Admitting: INTERNAL MEDICINE
Payer: MEDICARE

## 2024-01-05 DIAGNOSIS — N18.6 ESRD ON HEMODIALYSIS (HCC): ICD-10-CM

## 2024-01-05 DIAGNOSIS — Z99.2 ESRD ON HEMODIALYSIS (HCC): ICD-10-CM

## 2024-01-05 DIAGNOSIS — D64.9 ACUTE ON CHRONIC ANEMIA: Primary | ICD-10-CM

## 2024-01-05 DIAGNOSIS — D64.9 ANEMIA REQUIRING TRANSFUSIONS: ICD-10-CM

## 2024-01-05 LAB
ALBUMIN SERPL-MCNC: 3.9 G/DL (ref 3.5–5.2)
ALP SERPL-CCNC: 110 U/L (ref 35–104)
ALT SERPL-CCNC: 16 U/L (ref 0–32)
ANION GAP SERPL CALCULATED.3IONS-SCNC: 15 MMOL/L (ref 7–16)
AST SERPL-CCNC: 17 U/L (ref 0–31)
BASOPHILS # BLD: 0.02 K/UL (ref 0–0.2)
BASOPHILS NFR BLD: 0 % (ref 0–2)
BILIRUB SERPL-MCNC: 0.3 MG/DL (ref 0–1.2)
BUN SERPL-MCNC: 50 MG/DL (ref 6–20)
CALCIUM SERPL-MCNC: 9.2 MG/DL (ref 8.6–10.2)
CHLORIDE SERPL-SCNC: 98 MMOL/L (ref 98–107)
CO2 SERPL-SCNC: 28 MMOL/L (ref 22–29)
CREAT SERPL-MCNC: 9.6 MG/DL (ref 0.5–1)
EKG ATRIAL RATE: 93 BPM
EKG P AXIS: 35 DEGREES
EKG P-R INTERVAL: 146 MS
EKG Q-T INTERVAL: 358 MS
EKG QRS DURATION: 62 MS
EKG QTC CALCULATION (BAZETT): 445 MS
EKG R AXIS: 8 DEGREES
EKG T AXIS: 145 DEGREES
EKG VENTRICULAR RATE: 93 BPM
EOSINOPHIL # BLD: 0.18 K/UL (ref 0.05–0.5)
EOSINOPHILS RELATIVE PERCENT: 3 % (ref 0–6)
ERYTHROCYTE [DISTWIDTH] IN BLOOD BY AUTOMATED COUNT: 18.2 % (ref 11.5–15)
GFR SERPL CREATININE-BSD FRML MDRD: 5 ML/MIN/1.73M2
GLUCOSE SERPL-MCNC: 152 MG/DL (ref 74–99)
HCT VFR BLD AUTO: 20.9 % (ref 34–48)
HCT VFR BLD AUTO: 22.2 % (ref 34–48)
HGB BLD-MCNC: 6.9 G/DL (ref 11.5–15.5)
HGB BLD-MCNC: 7.4 G/DL (ref 11.5–15.5)
IMM GRANULOCYTES # BLD AUTO: <0.03 K/UL (ref 0–0.58)
IMM GRANULOCYTES NFR BLD: 0 % (ref 0–5)
INR PPP: 1
LACTATE BLDV-SCNC: 1.4 MMOL/L (ref 0.5–2.2)
LYMPHOCYTES NFR BLD: 1.04 K/UL (ref 1.5–4)
LYMPHOCYTES RELATIVE PERCENT: 17 % (ref 20–42)
MAGNESIUM SERPL-MCNC: 2.5 MG/DL (ref 1.6–2.6)
MCH RBC QN AUTO: 34.8 PG (ref 26–35)
MCHC RBC AUTO-ENTMCNC: 33 G/DL (ref 32–34.5)
MCV RBC AUTO: 105.6 FL (ref 80–99.9)
MONOCYTES NFR BLD: 0.37 K/UL (ref 0.1–0.95)
MONOCYTES NFR BLD: 6 % (ref 2–12)
NEUTROPHILS NFR BLD: 73 % (ref 43–80)
NEUTS SEG NFR BLD: 4.37 K/UL (ref 1.8–7.3)
PARTIAL THROMBOPLASTIN TIME: 27.4 SEC (ref 24.5–35.1)
PLATELET # BLD AUTO: 208 K/UL (ref 130–450)
PMV BLD AUTO: 9.5 FL (ref 7–12)
POTASSIUM SERPL-SCNC: 3.6 MMOL/L (ref 3.5–5)
PROT SERPL-MCNC: 7.3 G/DL (ref 6.4–8.3)
PROTHROMBIN TIME: 11 SEC (ref 9.3–12.4)
RBC # BLD AUTO: 1.98 M/UL (ref 3.5–5.5)
SODIUM SERPL-SCNC: 141 MMOL/L (ref 132–146)
WBC OTHER # BLD: 6 K/UL (ref 4.5–11.5)

## 2024-01-05 PROCEDURE — 86901 BLOOD TYPING SEROLOGIC RH(D): CPT

## 2024-01-05 PROCEDURE — 80053 COMPREHEN METABOLIC PANEL: CPT

## 2024-01-05 PROCEDURE — 6370000000 HC RX 637 (ALT 250 FOR IP): Performed by: INTERNAL MEDICINE

## 2024-01-05 PROCEDURE — 85014 HEMATOCRIT: CPT

## 2024-01-05 PROCEDURE — 85730 THROMBOPLASTIN TIME PARTIAL: CPT

## 2024-01-05 PROCEDURE — 83605 ASSAY OF LACTIC ACID: CPT

## 2024-01-05 PROCEDURE — 86923 COMPATIBILITY TEST ELECTRIC: CPT

## 2024-01-05 PROCEDURE — 86900 BLOOD TYPING SEROLOGIC ABO: CPT

## 2024-01-05 PROCEDURE — 99285 EMERGENCY DEPT VISIT HI MDM: CPT

## 2024-01-05 PROCEDURE — P9016 RBC LEUKOCYTES REDUCED: HCPCS

## 2024-01-05 PROCEDURE — 86850 RBC ANTIBODY SCREEN: CPT

## 2024-01-05 PROCEDURE — 93005 ELECTROCARDIOGRAM TRACING: CPT | Performed by: STUDENT IN AN ORGANIZED HEALTH CARE EDUCATION/TRAINING PROGRAM

## 2024-01-05 PROCEDURE — G0378 HOSPITAL OBSERVATION PER HR: HCPCS

## 2024-01-05 PROCEDURE — 85025 COMPLETE CBC W/AUTO DIFF WBC: CPT

## 2024-01-05 PROCEDURE — 85610 PROTHROMBIN TIME: CPT

## 2024-01-05 PROCEDURE — 93010 ELECTROCARDIOGRAM REPORT: CPT | Performed by: INTERNAL MEDICINE

## 2024-01-05 PROCEDURE — 2580000003 HC RX 258: Performed by: INTERNAL MEDICINE

## 2024-01-05 PROCEDURE — 83735 ASSAY OF MAGNESIUM: CPT

## 2024-01-05 PROCEDURE — 36430 TRANSFUSION BLD/BLD COMPNT: CPT

## 2024-01-05 PROCEDURE — 99223 1ST HOSP IP/OBS HIGH 75: CPT | Performed by: INTERNAL MEDICINE

## 2024-01-05 PROCEDURE — 85018 HEMOGLOBIN: CPT

## 2024-01-05 RX ORDER — SODIUM CHLORIDE 0.9 % (FLUSH) 0.9 %
5-40 SYRINGE (ML) INJECTION PRN
Status: DISCONTINUED | OUTPATIENT
Start: 2024-01-05 | End: 2024-01-06 | Stop reason: HOSPADM

## 2024-01-05 RX ORDER — CALCIUM ACETATE 667 MG/1
4 CAPSULE ORAL
Status: DISCONTINUED | OUTPATIENT
Start: 2024-01-06 | End: 2024-01-06 | Stop reason: HOSPADM

## 2024-01-05 RX ORDER — LANOLIN ALCOHOL/MO/W.PET/CERES
3 CREAM (GRAM) TOPICAL NIGHTLY PRN
Status: DISCONTINUED | OUTPATIENT
Start: 2024-01-05 | End: 2024-01-06 | Stop reason: HOSPADM

## 2024-01-05 RX ORDER — NEPHROCAP 1 MG
1 CAP ORAL DAILY
Status: DISCONTINUED | OUTPATIENT
Start: 2024-01-06 | End: 2024-01-06 | Stop reason: HOSPADM

## 2024-01-05 RX ORDER — POLYETHYLENE GLYCOL 3350 17 G/17G
17 POWDER, FOR SOLUTION ORAL DAILY PRN
Status: DISCONTINUED | OUTPATIENT
Start: 2024-01-05 | End: 2024-01-06 | Stop reason: HOSPADM

## 2024-01-05 RX ORDER — ACETAMINOPHEN 325 MG/1
650 TABLET ORAL EVERY 6 HOURS PRN
Status: DISCONTINUED | OUTPATIENT
Start: 2024-01-05 | End: 2024-01-06 | Stop reason: HOSPADM

## 2024-01-05 RX ORDER — CLOPIDOGREL BISULFATE 75 MG/1
75 TABLET ORAL DAILY
Status: DISCONTINUED | OUTPATIENT
Start: 2024-01-06 | End: 2024-01-06 | Stop reason: HOSPADM

## 2024-01-05 RX ORDER — ACETAMINOPHEN 650 MG/1
650 SUPPOSITORY RECTAL EVERY 6 HOURS PRN
Status: DISCONTINUED | OUTPATIENT
Start: 2024-01-05 | End: 2024-01-06 | Stop reason: HOSPADM

## 2024-01-05 RX ORDER — SODIUM CHLORIDE 9 MG/ML
INJECTION, SOLUTION INTRAVENOUS PRN
Status: DISCONTINUED | OUTPATIENT
Start: 2024-01-05 | End: 2024-01-06 | Stop reason: HOSPADM

## 2024-01-05 RX ORDER — CALCIUM CARBONATE 500 MG/1
500 TABLET, CHEWABLE ORAL 3 TIMES DAILY PRN
Status: DISCONTINUED | OUTPATIENT
Start: 2024-01-05 | End: 2024-01-06 | Stop reason: HOSPADM

## 2024-01-05 RX ORDER — ONDANSETRON 4 MG/1
4 TABLET, ORALLY DISINTEGRATING ORAL EVERY 8 HOURS PRN
Status: DISCONTINUED | OUTPATIENT
Start: 2024-01-05 | End: 2024-01-06 | Stop reason: HOSPADM

## 2024-01-05 RX ORDER — SODIUM CHLORIDE 0.9 % (FLUSH) 0.9 %
5-40 SYRINGE (ML) INJECTION EVERY 12 HOURS SCHEDULED
Status: DISCONTINUED | OUTPATIENT
Start: 2024-01-05 | End: 2024-01-06 | Stop reason: HOSPADM

## 2024-01-05 RX ORDER — ONDANSETRON 2 MG/ML
4 INJECTION INTRAMUSCULAR; INTRAVENOUS EVERY 6 HOURS PRN
Status: DISCONTINUED | OUTPATIENT
Start: 2024-01-05 | End: 2024-01-06 | Stop reason: HOSPADM

## 2024-01-05 RX ORDER — FAMOTIDINE 20 MG/1
20 TABLET, FILM COATED ORAL EVERY MORNING
Status: DISCONTINUED | OUTPATIENT
Start: 2024-01-06 | End: 2024-01-06 | Stop reason: HOSPADM

## 2024-01-05 RX ORDER — CARVEDILOL 25 MG/1
50 TABLET ORAL EVERY MORNING
Status: DISCONTINUED | OUTPATIENT
Start: 2024-01-06 | End: 2024-01-06 | Stop reason: HOSPADM

## 2024-01-05 RX ORDER — INSULIN LISPRO 100 [IU]/ML
6 INJECTION, SOLUTION INTRAVENOUS; SUBCUTANEOUS
Status: DISCONTINUED | OUTPATIENT
Start: 2024-01-06 | End: 2024-01-06

## 2024-01-05 RX ORDER — HYDRALAZINE HYDROCHLORIDE 20 MG/ML
10 INJECTION INTRAMUSCULAR; INTRAVENOUS EVERY 6 HOURS PRN
Status: DISCONTINUED | OUTPATIENT
Start: 2024-01-05 | End: 2024-01-06 | Stop reason: HOSPADM

## 2024-01-05 RX ORDER — BENZONATATE 100 MG/1
100 CAPSULE ORAL 3 TIMES DAILY PRN
Status: DISCONTINUED | OUTPATIENT
Start: 2024-01-05 | End: 2024-01-06 | Stop reason: HOSPADM

## 2024-01-05 RX ORDER — ALBUTEROL SULFATE 2.5 MG/3ML
2.5 SOLUTION RESPIRATORY (INHALATION) EVERY 4 HOURS PRN
Status: DISCONTINUED | OUTPATIENT
Start: 2024-01-05 | End: 2024-01-06 | Stop reason: HOSPADM

## 2024-01-05 RX ORDER — INSULIN GLARGINE 100 [IU]/ML
12 INJECTION, SOLUTION SUBCUTANEOUS NIGHTLY
Status: DISCONTINUED | OUTPATIENT
Start: 2024-01-05 | End: 2024-01-06 | Stop reason: HOSPADM

## 2024-01-05 RX ADMIN — Medication 10 ML: at 21:00

## 2024-01-05 RX ADMIN — INSULIN GLARGINE 12 UNITS: 100 INJECTION, SOLUTION SUBCUTANEOUS at 23:12

## 2024-01-05 NOTE — CONSULTS
hemoglobin<7  Monitor H&H    3.  Hypertension with CKD 5/ESRD  BP goal<130/80  BP at goal  Monitor BPs    4.  Secondary hyperparathyroidism of renal origin   and phosphorus 4 on 1/2 in the outpatient setting  Check phosphorus in the a.m.  Monitor labs    KEMI Lewis - CNP    Patient seen and examined all key components of the physical performed independently , case discussed with NP, all pertinent labs and radiologic tests personally reviewed agree with above.      Danial Gerard MD

## 2024-01-05 NOTE — ED PROVIDER NOTES
[facility] EMERGENCY DEPARTMENT  EMERGENCY DEPARTMENT ENCOUNTER        Pt Name: Michelle Nettles  MRN: [id number]  Birthdate 1985  Date of evaluation: 1/5/2024  Provider: Rabia Joseph DO  PCP: May Waters DO  Note Started: 1:35 PM EST 1/5/24    CHIEF COMPLAINT       Chief Complaint   Patient presents with    Anemia     Sent in by Drs office for transfusion + missed dialysis yesterday        HISTORY OF PRESENT ILLNESS: 1 or more Elements     Limitations to history : None    Michelle Nettles is a 38 y.o. female with PMHx of diabetes, HTN, ESRD on HD, who presents for evaluation of low blood count.  She has chronic anemia due to CKD.  She has not had any diarrhea, black or bloody stools.  She reports mild fatigue, no other symptoms reported.  States her hemoglobin was low, unsure what it was.  She has a history of requiring blood transfusions often in the past.  She did miss dialysis yesterday.  She has not had any fevers, chest pain or palpitations, shortness of breath, abdominal pain, nausea or vomiting, cough or sore throat.      Nursing Notes were all reviewed and agreed with or any disagreements were addressed in the HPI.      REVIEW OF EXTERNAL NOTE :       Progress note reviewed from endocrinology on 1/3/2023.    Chart Review/External Note Review    Last Echo reviewed by Me:  Lab Results   Component Value Date    LVEF 63 06/30/2023    LVEFMODE Echo 08/18/2016           Controlled Substance Monitoring:    Acute and Chronic Pain Monitoring:   RX Monitoring Acute Pain Prescriptions Periodic Controlled Substance Monitoring   9/21/2021  12:05 PM Prescription exceeds daily limit for a specific reason. See comments or note. Possible medication side effects, risk of tolerance/dependence & alternative treatments discussed.             REVIEW OF SYSTEMS :      Review of Systems   Constitutional:  Positive for fatigue. Negative for chills and  recognition software. Every effort was made to ensure accuracy; however, inadvertent computerized transcription errors may be present            Rabia Joseph DO  01/06/24 [id number]

## 2024-01-06 ENCOUNTER — APPOINTMENT (OUTPATIENT)
Dept: GENERAL RADIOLOGY | Age: 39
End: 2024-01-06
Payer: MEDICARE

## 2024-01-06 VITALS
SYSTOLIC BLOOD PRESSURE: 138 MMHG | TEMPERATURE: 96.9 F | DIASTOLIC BLOOD PRESSURE: 69 MMHG | HEART RATE: 106 BPM | RESPIRATION RATE: 18 BRPM | OXYGEN SATURATION: 99 %

## 2024-01-06 LAB
ABO/RH: NORMAL
ANION GAP SERPL CALCULATED.3IONS-SCNC: 14 MMOL/L (ref 7–16)
ANTIBODY SCREEN: NEGATIVE
ARM BAND NUMBER: NORMAL
BLOOD BANK BLOOD PRODUCT EXPIRATION DATE: NORMAL
BLOOD BANK DISPENSE STATUS: NORMAL
BLOOD BANK ISBT PRODUCT BLOOD TYPE: 5100
BLOOD BANK PRODUCT CODE: NORMAL
BLOOD BANK SAMPLE EXPIRATION: NORMAL
BLOOD BANK UNIT TYPE AND RH: NORMAL
BPU ID: NORMAL
BUN SERPL-MCNC: 56 MG/DL (ref 6–20)
CALCIUM SERPL-MCNC: 8.8 MG/DL (ref 8.6–10.2)
CHLORIDE SERPL-SCNC: 98 MMOL/L (ref 98–107)
CO2 SERPL-SCNC: 25 MMOL/L (ref 22–29)
COMPONENT: NORMAL
CREAT SERPL-MCNC: 10.4 MG/DL (ref 0.5–1)
CROSSMATCH RESULT: NORMAL
ERYTHROCYTE [DISTWIDTH] IN BLOOD BY AUTOMATED COUNT: 20.2 % (ref 11.5–15)
GFR SERPL CREATININE-BSD FRML MDRD: 4 ML/MIN/1.73M2
GLUCOSE BLD-MCNC: 118 MG/DL (ref 74–99)
GLUCOSE BLD-MCNC: 46 MG/DL (ref 74–99)
GLUCOSE BLD-MCNC: 76 MG/DL (ref 74–99)
GLUCOSE BLD-MCNC: 83 MG/DL (ref 74–99)
GLUCOSE SERPL-MCNC: 73 MG/DL (ref 74–99)
HCT VFR BLD AUTO: 23.1 % (ref 34–48)
HGB BLD-MCNC: 7.8 G/DL (ref 11.5–15.5)
MAGNESIUM SERPL-MCNC: 2.3 MG/DL (ref 1.6–2.6)
MCH RBC QN AUTO: 33.6 PG (ref 26–35)
MCHC RBC AUTO-ENTMCNC: 33.8 G/DL (ref 32–34.5)
MCV RBC AUTO: 99.6 FL (ref 80–99.9)
PHOSPHATE SERPL-MCNC: 5.9 MG/DL (ref 2.5–4.5)
PLATELET # BLD AUTO: 184 K/UL (ref 130–450)
PMV BLD AUTO: 9.5 FL (ref 7–12)
POTASSIUM SERPL-SCNC: 4.6 MMOL/L (ref 3.5–5)
RBC # BLD AUTO: 2.32 M/UL (ref 3.5–5.5)
SODIUM SERPL-SCNC: 137 MMOL/L (ref 132–146)
TRANSFUSION STATUS: NORMAL
UNIT DIVISION: 0
UNIT ISSUE DATE/TIME: NORMAL
WBC OTHER # BLD: 5.9 K/UL (ref 4.5–11.5)

## 2024-01-06 PROCEDURE — 82962 GLUCOSE BLOOD TEST: CPT

## 2024-01-06 PROCEDURE — 83735 ASSAY OF MAGNESIUM: CPT

## 2024-01-06 PROCEDURE — 2700000000 HC OXYGEN THERAPY PER DAY

## 2024-01-06 PROCEDURE — 90935 HEMODIALYSIS ONE EVALUATION: CPT

## 2024-01-06 PROCEDURE — 99239 HOSP IP/OBS DSCHRG MGMT >30: CPT | Performed by: STUDENT IN AN ORGANIZED HEALTH CARE EDUCATION/TRAINING PROGRAM

## 2024-01-06 PROCEDURE — G0378 HOSPITAL OBSERVATION PER HR: HCPCS

## 2024-01-06 PROCEDURE — 71045 X-RAY EXAM CHEST 1 VIEW: CPT

## 2024-01-06 PROCEDURE — 84100 ASSAY OF PHOSPHORUS: CPT

## 2024-01-06 PROCEDURE — 85027 COMPLETE CBC AUTOMATED: CPT

## 2024-01-06 PROCEDURE — 80048 BASIC METABOLIC PNL TOTAL CA: CPT

## 2024-01-06 PROCEDURE — 2580000003 HC RX 258: Performed by: INTERNAL MEDICINE

## 2024-01-06 RX ORDER — INSULIN LISPRO 100 [IU]/ML
4 INJECTION, SOLUTION INTRAVENOUS; SUBCUTANEOUS
Status: DISCONTINUED | OUTPATIENT
Start: 2024-01-06 | End: 2024-01-06 | Stop reason: HOSPADM

## 2024-01-06 RX ORDER — INSULIN LISPRO 100 [IU]/ML
4 INJECTION, SOLUTION INTRAVENOUS; SUBCUTANEOUS
Qty: 15 ADJUSTABLE DOSE PRE-FILLED PEN SYRINGE | Refills: 3 | Status: SHIPPED | OUTPATIENT
Start: 2024-01-06

## 2024-01-06 RX ORDER — INSULIN GLARGINE 300 U/ML
INJECTION, SOLUTION SUBCUTANEOUS
Qty: 1 ADJUSTABLE DOSE PRE-FILLED PEN SYRINGE | Refills: 1 | Status: SHIPPED | OUTPATIENT
Start: 2024-01-06

## 2024-01-06 RX ORDER — DEXTROSE MONOHYDRATE 100 MG/ML
INJECTION, SOLUTION INTRAVENOUS CONTINUOUS PRN
Status: DISCONTINUED | OUTPATIENT
Start: 2024-01-06 | End: 2024-01-06 | Stop reason: HOSPADM

## 2024-01-06 RX ORDER — DEXTROSE MONOHYDRATE 100 MG/ML
INJECTION, SOLUTION INTRAVENOUS CONTINUOUS PRN
Status: DISCONTINUED | OUTPATIENT
Start: 2024-01-06 | End: 2024-01-06 | Stop reason: SDUPTHER

## 2024-01-06 RX ADMIN — DEXTROSE 125 ML: 10 SOLUTION INTRAVENOUS at 07:07

## 2024-01-06 RX ADMIN — DEXTROSE 125 ML: 10 SOLUTION INTRAVENOUS at 03:00

## 2024-01-06 ASSESSMENT — ENCOUNTER SYMPTOMS
ABDOMINAL PAIN: 0
CONSTIPATION: 0
DIARRHEA: 0
VOMITING: 0
COUGH: 0
NAUSEA: 0
BLOOD IN STOOL: 0
SHORTNESS OF BREATH: 0

## 2024-01-06 ASSESSMENT — PAIN - FUNCTIONAL ASSESSMENT
PAIN_FUNCTIONAL_ASSESSMENT: NONE - DENIES PAIN
PAIN_FUNCTIONAL_ASSESSMENT: NONE - DENIES PAIN

## 2024-01-06 NOTE — FLOWSHEET NOTE
01/06/24 1210   Vital Signs   /69   Temp 96.9 °F (36.1 °C)   Pulse (!) 106   Respirations 18   Weight - Scale   (cart)   Pain Assessment   Pain Assessment None - Denies Pain   Post-Hemodialysis Assessment   Post-Treatment Procedures Blood returned;Access bleeding time < 10 minutes   Machine Disinfection Process Exterior Machine Disinfection   Rinseback Volume (ml) 300 ml   Blood Volume Processed (Liters) 90 L   Dialyzer Clearance Moderately streaked   Duration of Treatment (minutes) 240 minutes   Heparin Amount Administered During Treatment (mL) 0 mL   Hemodialysis Intake (ml) 300 ml   Hemodialysis Output (ml) 3600 ml   NET Removed (ml) 3300   Patient Response to Treatment tolerated well   Bilateral Breath Sounds Diminished   Edema Generalized   Physician Notified No   Time Off 1206   Patient Disposition Return to room   Observations & Evaluations   Level of Consciousness 0   Oriented X 3   Heart Rhythm Regular   Respiratory Quality/Effort Unlabored   O2 Device Nasal cannula   Skin Condition/Temp Warm;Dry   Abdomen Inspection Soft   Comments  --

## 2024-01-06 NOTE — PROGRESS NOTES
pg/mL Final       Recent Labs     01/05/24  1419   ALT 16   AST 17   ALKPHOS 110*   BILITOT 0.3       Recent Labs     01/05/24  1419   LABALBU 3.9       Ferritin   Date Value Ref Range Status   08/30/2022 180 (A) 9.0 - 150.0 ng/mL Final     Iron   Date Value Ref Range Status   03/24/2023 78 37 - 145 mcg/dL Final     TIBC   Date Value Ref Range Status   03/24/2023 196 (L) 250 - 450 mcg/dL Final       Vitamin B-12   Date Value Ref Range Status   03/24/2023 1021 (H) 211 - 946 pg/mL Final       Folate   Date Value Ref Range Status   03/24/2023 13.2 4.8 - 24.2 ng/mL Final       Lab Results   Component Value Date/Time    COLORU Yellow 11/23/2017 10:05 PM    NITRU Negative 11/23/2017 10:05 PM    GLUCOSEU 500 11/23/2017 10:05 PM    GLUCOSEU >=1000 11/21/2010 10:30 PM    KETUA Negative 11/23/2017 10:05 PM    UROBILINOGEN 0.2 11/23/2017 10:05 PM    BILIRUBINUR Negative 11/23/2017 10:05 PM    BILIRUBINUR NEGATIVE 11/21/2010 10:30 PM       No results found for: \"NACHO\", \"CREURRAN\", \"OSMOU\"    No components found for: \"URIC\"    No results found for: \"LIPIDPAN\"    Assessment and Plans:    ESRD on HD  Outpatient [facility] via left arm AV fistula  Monitor labs  Continue HD while inpatient  Seen on HD this am, tolerating well.     2.  Anemia of CKD  Hemoglobin target 10-12  Hemoglobin 6.9-below target  S/P PRBCs 1/5 with Hgb now 7.4 g/dL  VALENTIN as OP  Transfuse for hemoglobin<7  Monitor H&H    3.  Hypertension with CKD 5/ESRD  BP goal<130/80  BP above goal  Monitor BPs with UF    4.  Secondary hyperparathyroidism of renal origin   and phosphorus 4 on 1/2 in the outpatient setting  PO4 5.9 mg/dL this am.   Monitor labs    Amaris Davidson, APRN - CNP    Patient seen and examined all key components of the physical performed independently , case discussed with NP, all pertinent labs and radiologic tests personally reviewed agree with above.        Procedure: dialysis  Danial S Gerard, MD

## 2024-01-06 NOTE — H&P
48.0 %    .6 (H) 80.0 - 99.9 fL    MCH 34.8 26.0 - 35.0 pg    MCHC 33.0 32.0 - 34.5 g/dL    RDW 18.2 (H) 11.5 - 15.0 %    Platelets 208 130 - 450 k/uL    MPV 9.5 7.0 - 12.0 fL    Neutrophils % 73 43.0 - 80.0 %    Lymphocytes % 17 (L) 20.0 - 42.0 %    Monocytes % 6 2.0 - 12.0 %    Eosinophils % 3 0 - 6 %    Basophils % 0 0.0 - 2.0 %    Immature Granulocytes 0 0.0 - 5.0 %    Neutrophils Absolute 4.37 1.80 - 7.30 k/uL    Lymphocytes Absolute 1.04 (L) 1.50 - 4.00 k/uL    Monocytes Absolute 0.37 0.10 - 0.95 k/uL    Eosinophils Absolute 0.18 0.05 - 0.50 k/uL    Basophils Absolute 0.02 0.00 - 0.20 k/uL    Absolute Immature Granulocyte <0.03 0.00 - 0.58 k/uL   CMP    Collection Time: 01/05/24  2:19 PM   Result Value Ref Range    Sodium 141 132 - 146 mmol/L    Potassium 3.6 3.5 - 5.0 mmol/L    Chloride 98 98 - 107 mmol/L    CO2 28 22 - 29 mmol/L    Anion Gap 15 7 - 16 mmol/L    Glucose 152 (H) 74 - 99 mg/dL    BUN 50 (H) 6 - 20 mg/dL    Creatinine 9.6 (HH) 0.50 - 1.00 mg/dL    Est, Glom Filt Rate 5 (L) >60 mL/min/1.73m2    Calcium 9.2 8.6 - 10.2 mg/dL    Total Protein 7.3 6.4 - 8.3 g/dL    Albumin 3.9 3.5 - 5.2 g/dL    Total Bilirubin 0.3 0.0 - 1.2 mg/dL    Alkaline Phosphatase 110 (H) 35 - 104 U/L    ALT 16 0 - 32 U/L    AST 17 0 - 31 U/L   Magnesium    Collection Time: 01/05/24  2:19 PM   Result Value Ref Range    Magnesium 2.5 1.6 - 2.6 mg/dL   Protime-INR    Collection Time: 01/05/24  2:19 PM   Result Value Ref Range    Protime 11.0 9.3 - 12.4 sec    INR 1.0    Lactic Acid    Collection Time: 01/05/24  2:19 PM   Result Value Ref Range    Lactic Acid 1.4 0.5 - 2.2 mmol/L   APTT    Collection Time: 01/05/24  2:19 PM   Result Value Ref Range    PTT 27.4 24.5 - 35.1 sec   TYPE AND SCREEN    Collection Time: 01/05/24  2:19 PM   Result Value Ref Range    Blood Bank Sample Expiration 01/08/2024,2359     Arm Band Number [id number]     ABO/Rh O POSITIVE     Antibody Screen NEGATIVE     Unit Number [id number]     Component

## 2024-01-06 NOTE — DISCHARGE SUMMARY
Hospital Medicine Discharge Summary    Patient ID: Michelle Nettles      Patient's PCP: May Waters DO    Admit Date: 1/5/2024     Discharge Date: 1/6/2024      Admitting Physician: Casi Batista DO     Discharge Physician: Della Nguyen MD     Discharge Diagnoses:       Active Hospital Problems    Diagnosis Date Noted    ESRD needing dialysis (HCC) [N18.6, Z99.2] 01/05/2024       The patient was seen and examined on day of discharge and this discharge summary is in conjunction with any daily progress note from day of discharge.    Hospital Course:     Michelle Nettles has a past medical history that includes diabetes mellitus, hypertension, and ESRD on dialysis.  Presented to ER with abnormal labs, she was found to be anemic with hemoglobin of 6.9 and was sent to ER for blood transfusion, she missed her dialysis yesterday, her symptoms are shortness of breath with exertion, associated with generalized swelling.  She was given blood transfusion repeat H&H showed hemoglobin of 7.8, she received dialysis this morning.  Case discussed with her nephrologist, she was deemed stable from nephrology standpoint to be discharged after completing her dialysis treatment.  She stated her symptoms has significantly improved this morning,  will continue her home medications for diabetes mellitus type 2 and hypertension discharge.     Exam:     /69   Pulse (!) 106   Temp 96.9 °F (36.1 °C)   Resp 18   SpO2 99%     General appearance: No apparent distress, appears stated age and cooperative.  HEENT: Pupils equal, round, and reactive to light. Conjunctivae/corneas clear.  Neck: Supple, with full range of motion. No jugular venous distention. Trachea midline.  Respiratory:  Normal respiratory effort. Clear to auscultation, bilaterally without Rales/Wheezes/Rhonchi.  Cardiovascular: Regular rate and rhythm with normal S1/S2 without murmurs, rubs or gallops.  Abdomen: Soft, non-tender,

## 2024-01-08 ENCOUNTER — OFFICE VISIT (OUTPATIENT)
Dept: FAMILY MEDICINE CLINIC | Age: 39
End: 2024-01-08
Payer: MEDICARE

## 2024-01-08 VITALS
RESPIRATION RATE: 16 BRPM | HEART RATE: 101 BPM | HEIGHT: 59 IN | SYSTOLIC BLOOD PRESSURE: 110 MMHG | OXYGEN SATURATION: 100 % | DIASTOLIC BLOOD PRESSURE: 52 MMHG | WEIGHT: 153.8 LBS | BODY MASS INDEX: 31 KG/M2 | TEMPERATURE: 97.6 F

## 2024-01-08 DIAGNOSIS — Z99.2 ESRD ON HEMODIALYSIS (HCC): ICD-10-CM

## 2024-01-08 DIAGNOSIS — K04.7 CHRONIC DENTAL INFECTION: ICD-10-CM

## 2024-01-08 DIAGNOSIS — I82.721 CHRONIC DEEP VEIN THROMBOSIS (DVT) OF RIGHT UPPER EXTREMITY, UNSPECIFIED VEIN (HCC): ICD-10-CM

## 2024-01-08 DIAGNOSIS — N18.6 TYPE 1 DIABETES MELLITUS WITH CHRONIC KIDNEY DISEASE ON CHRONIC DIALYSIS (HCC): Primary | ICD-10-CM

## 2024-01-08 DIAGNOSIS — J96.11 CHRONIC HYPOXIC RESPIRATORY FAILURE, ON HOME OXYGEN THERAPY (HCC): ICD-10-CM

## 2024-01-08 DIAGNOSIS — E10.22 TYPE 1 DIABETES MELLITUS WITH CHRONIC KIDNEY DISEASE ON CHRONIC DIALYSIS (HCC): Primary | ICD-10-CM

## 2024-01-08 DIAGNOSIS — Z99.2 TYPE 1 DIABETES MELLITUS WITH CHRONIC KIDNEY DISEASE ON CHRONIC DIALYSIS (HCC): Primary | ICD-10-CM

## 2024-01-08 DIAGNOSIS — I50.22 CHRONIC SYSTOLIC (CONGESTIVE) HEART FAILURE (HCC): ICD-10-CM

## 2024-01-08 DIAGNOSIS — N18.6 ESRD ON HEMODIALYSIS (HCC): ICD-10-CM

## 2024-01-08 DIAGNOSIS — Z99.81 CHRONIC HYPOXIC RESPIRATORY FAILURE, ON HOME OXYGEN THERAPY (HCC): ICD-10-CM

## 2024-01-08 PROCEDURE — 3046F HEMOGLOBIN A1C LEVEL >9.0%: CPT | Performed by: INTERNAL MEDICINE

## 2024-01-08 PROCEDURE — 99214 OFFICE O/P EST MOD 30 MIN: CPT | Performed by: INTERNAL MEDICINE

## 2024-01-08 PROCEDURE — 1036F TOBACCO NON-USER: CPT | Performed by: INTERNAL MEDICINE

## 2024-01-08 PROCEDURE — 2022F DILAT RTA XM EVC RTNOPTHY: CPT | Performed by: INTERNAL MEDICINE

## 2024-01-08 PROCEDURE — G8427 DOCREV CUR MEDS BY ELIG CLIN: HCPCS | Performed by: INTERNAL MEDICINE

## 2024-01-08 PROCEDURE — 3074F SYST BP LT 130 MM HG: CPT | Performed by: INTERNAL MEDICINE

## 2024-01-08 PROCEDURE — 3078F DIAST BP <80 MM HG: CPT | Performed by: INTERNAL MEDICINE

## 2024-01-08 PROCEDURE — G8417 CALC BMI ABV UP PARAM F/U: HCPCS | Performed by: INTERNAL MEDICINE

## 2024-01-08 PROCEDURE — G8482 FLU IMMUNIZE ORDER/ADMIN: HCPCS | Performed by: INTERNAL MEDICINE

## 2024-01-08 RX ORDER — AMOXICILLIN 500 MG/1
500 CAPSULE ORAL 2 TIMES DAILY
Qty: 20 CAPSULE | Refills: 0 | Status: SHIPPED | OUTPATIENT
Start: 2024-01-08 | End: 2024-01-18

## 2024-01-08 ASSESSMENT — PATIENT HEALTH QUESTIONNAIRE - PHQ9
SUM OF ALL RESPONSES TO PHQ QUESTIONS 1-9: 0
SUM OF ALL RESPONSES TO PHQ9 QUESTIONS 1 & 2: 0
SUM OF ALL RESPONSES TO PHQ QUESTIONS 1-9: 0
SUM OF ALL RESPONSES TO PHQ QUESTIONS 1-9: 0
1. LITTLE INTEREST OR PLEASURE IN DOING THINGS: 0
2. FEELING DOWN, DEPRESSED OR HOPELESS: 0
SUM OF ALL RESPONSES TO PHQ QUESTIONS 1-9: 0

## 2024-01-08 NOTE — PROGRESS NOTES
Full problem list is as follows:  Patient Active Problem List   Diagnosis    Type 1 diabetes mellitus with chronic kidney disease on chronic dialysis ([facility])    HTN (hypertension), benign    Chronic deep vein thrombosis (DVT) of right upper extremity ([facility])    Facial cellulitis    Acute hyperkalemia    Nasal polyp    Periorbital cellulitis of right eye    Periorbital cellulitis    Vitamin B deficiency, unspecified    Nasal congestion    Hidradenitis suppurativa    Symptomatic anemia    Anemia    Gall stone    Metabolic encephalopathy    Hypoxia    Hypotension    History of venous thromboembolism    Chronic systolic (congestive) heart failure    MRSA colonization    ESRD on hemodialysis ([facility])    SOB (shortness of breath)    Chronic hypoxic respiratory failure, on home oxygen therapy ([facility])       Michelle was seen today for follow-up, hypertension and diabetes.    Diagnoses and all orders for this visit:    Type 1 diabetes mellitus with chronic kidney disease on chronic dialysis ([facility])  -Stable, last A1c was 6.6 checked at the beginning of November.  Continue insulin as prescribed.  Continues follow-up with endocrinology as well  -Follow-up in 3 months    Chronic hypoxic respiratory failure, on home oxygen therapy ([facility])  -     [facility] Order for Home Oxygen as OP  -Chronically on 3 L of oxygen doing well on this.  She would like to switch oxygen suppliers and therefore a new order for oxygen has been sent to [facility].    Chronic systolic (congestive) heart failure ([facility])  -Stable, continue carvedilol, hydralazine  -Follow-up in 3 months    ESRD on hemodialysis ([facility])  -Stable, continue compliance with dialysis sessions, follows with nephrology, follow-up in 3 months    Chronic dental infection  -     amoxicillin (AMOXIL) 500 MG capsule; Take 1 capsule by mouth 2 times daily for 10 days        Educational materials and/or home exercises printed for patient's review and were included in patient instructions on his/her After Visit

## 2024-01-09 ENCOUNTER — CARE COORDINATION (OUTPATIENT)
Dept: CARE COORDINATION | Age: 39
End: 2024-01-09

## 2024-01-09 NOTE — CARE COORDINATION
Care Transitions Initial Follow Up Call    Call within 2 business days of discharge: Yes    -First attempt to reach the patient for initial Care Transition call post hospital discharge. HIPAA compliant message left with CTN's contact information requesting return phone call.         Patient: Michelle Nettles Patient : 1985   MRN: [id number]  Reason for Admission: Acute on chronic anemia  Discharge Date: 24 RARS: Readmission Risk Score: 30.8      Last Discharge Facility       Date Complaint Diagnosis Description Type Department Provider    24 Anemia Acute on chronic anemia ... ED (DISCHARGE) [facility] Della Rincon MD; Lit Joseph...                Follow Up  Future Appointments   Date Time Provider Department Center   1/15/2024  1:00 PM Hyalee Zaidi MD VASC/MED [facility]   2024 11:15 AM Brennan Dyer MD BDM ENDO [facility]         Louisa Boland RN

## 2024-01-10 ENCOUNTER — CARE COORDINATION (OUTPATIENT)
Dept: CARE COORDINATION | Age: 39
End: 2024-01-10

## 2024-01-10 NOTE — CARE COORDINATION
Care Transitions Initial Follow Up Call    Call within 2 business days of discharge: Yes    -Second attempt to reach the patient for initial Care Transition call post hospital discharge. HIPAA compliant message left with CTN's contact information requesting return phone call.   -Noted in EMR patient completed PCP appointment yesterday(24.)  -Per chart review the patient is  active on [employer], CTN will send unable to reach letter in [employer].    -CTN will exclude from Care Transition calls until 24 (one month) due to being unable to reach after two consecutive admissions.       Patient: Michelle Nettles Patient : 1985   MRN: [id number]  Reason for Admission: Acute on chronic anemia  Discharge Date: 24 RARS: Readmission Risk Score: 30.8      Last Discharge Facility       Date Complaint Diagnosis Description Type Department Provider    24 Anemia Acute on chronic anemia ... ED (DISCHARGE) [facility] Della Rincon MD; Lit Joseph...                Follow Up  Future Appointments   Date Time Provider Department Center   1/15/2024  1:00 PM Haylee Zaidi MD VASC/MED [facility]   2024 11:15 AM Brennan Dyer MD BDM ENDO [facility]         Louisa Boland RN

## 2024-01-11 PROBLEM — I82.721 CHRONIC DEEP VEIN THROMBOSIS (DVT) OF RIGHT UPPER EXTREMITY (HCC): Chronic | Status: RESOLVED | Noted: 2018-05-07 | Resolved: 2024-01-11

## 2024-01-15 ENCOUNTER — CARE COORDINATION (OUTPATIENT)
Dept: CARE COORDINATION | Age: 39
End: 2024-01-15

## 2024-01-15 NOTE — CARE COORDINATION
-CTN received from patient a VM and then a inbasket reply to unable to reach letter from patient on 1/12/24.  Patient requested a call to her home phone  if it was a M-W-F.  -CTN attempted return call to patient and got VM, message left.  -CTN to sign off.

## 2024-01-16 DIAGNOSIS — J96.11 CHRONIC HYPOXIC RESPIRATORY FAILURE, ON HOME OXYGEN THERAPY (HCC): ICD-10-CM

## 2024-01-16 DIAGNOSIS — Z99.2 TYPE 1 DIABETES MELLITUS WITH CHRONIC KIDNEY DISEASE ON CHRONIC DIALYSIS (HCC): Primary | ICD-10-CM

## 2024-01-16 DIAGNOSIS — Z99.81 CHRONIC HYPOXIC RESPIRATORY FAILURE, ON HOME OXYGEN THERAPY (HCC): ICD-10-CM

## 2024-01-16 DIAGNOSIS — E10.22 TYPE 1 DIABETES MELLITUS WITH CHRONIC KIDNEY DISEASE ON CHRONIC DIALYSIS (HCC): Primary | ICD-10-CM

## 2024-01-16 DIAGNOSIS — Z99.2 ESRD ON HEMODIALYSIS (HCC): ICD-10-CM

## 2024-01-16 DIAGNOSIS — N18.6 TYPE 1 DIABETES MELLITUS WITH CHRONIC KIDNEY DISEASE ON CHRONIC DIALYSIS (HCC): Primary | ICD-10-CM

## 2024-01-16 DIAGNOSIS — N18.6 ESRD ON HEMODIALYSIS (HCC): ICD-10-CM

## 2024-01-24 DIAGNOSIS — K21.9 GASTROESOPHAGEAL REFLUX DISEASE WITHOUT ESOPHAGITIS: ICD-10-CM

## 2024-01-24 RX ORDER — FAMOTIDINE 20 MG/1
20 TABLET, FILM COATED ORAL EVERY MORNING
Qty: 30 TABLET | Refills: 3 | Status: SHIPPED | OUTPATIENT
Start: 2024-01-24

## 2024-01-24 RX ORDER — PSEUDOEPHED/ACETAMINOPH/DIPHEN 30MG-500MG
TABLET ORAL
Qty: 120 TABLET | Refills: 0 | Status: SHIPPED | OUTPATIENT
Start: 2024-01-24

## 2024-01-24 NOTE — TELEPHONE ENCOUNTER
Last Appointment:  1/8/2024  Future Appointments   Date Time Provider Department Center   1/29/2024  2:00 PM Haylee Zaidi MD VASC/MED [facility]   5/20/2024 11:15 AM Brennan Dyer MD BD ENDO [facility]

## 2024-01-30 ENCOUNTER — HOSPITAL ENCOUNTER (OUTPATIENT)
Age: 39
Setting detail: OBSERVATION
Discharge: HOME | End: 2024-02-02
Attending: EMERGENCY MEDICINE | Admitting: INTERNAL MEDICINE
Payer: MEDICARE

## 2024-01-30 ENCOUNTER — APPOINTMENT (OUTPATIENT)
Dept: GENERAL RADIOLOGY | Age: 39
End: 2024-01-30
Payer: MEDICARE

## 2024-01-30 DIAGNOSIS — Z99.2 END STAGE RENAL DISEASE ON DIALYSIS (HCC): ICD-10-CM

## 2024-01-30 DIAGNOSIS — N18.6 END STAGE RENAL DISEASE ON DIALYSIS (HCC): ICD-10-CM

## 2024-01-30 DIAGNOSIS — R09.89 PULMONARY CONGESTION: ICD-10-CM

## 2024-01-30 DIAGNOSIS — D64.9 ACUTE ON CHRONIC ANEMIA: Primary | ICD-10-CM

## 2024-01-30 LAB
ALBUMIN SERPL-MCNC: 3.7 G/DL (ref 3.5–5.2)
ALP SERPL-CCNC: 94 U/L (ref 35–104)
ALT SERPL-CCNC: 14 U/L (ref 0–32)
ANION GAP SERPL CALCULATED.3IONS-SCNC: 13 MMOL/L (ref 7–16)
ANION GAP SERPL CALCULATED.3IONS-SCNC: 16 MMOL/L (ref 7–16)
AST SERPL-CCNC: 22 U/L (ref 0–31)
BASOPHILS # BLD: 0.02 K/UL (ref 0–0.2)
BASOPHILS NFR BLD: 0 % (ref 0–2)
BILIRUB DIRECT SERPL-MCNC: <0.2 MG/DL (ref 0–0.3)
BILIRUB INDIRECT SERPL-MCNC: NORMAL MG/DL (ref 0–1)
BILIRUB SERPL-MCNC: 0.3 MG/DL (ref 0–1.2)
BNP SERPL-MCNC: ABNORMAL PG/ML (ref 0–125)
BUN SERPL-MCNC: 56 MG/DL (ref 6–20)
BUN SERPL-MCNC: 58 MG/DL (ref 6–20)
CALCIUM SERPL-MCNC: 9.4 MG/DL (ref 8.6–10.2)
CALCIUM SERPL-MCNC: 9.5 MG/DL (ref 8.6–10.2)
CHLORIDE SERPL-SCNC: 96 MMOL/L (ref 98–107)
CHLORIDE SERPL-SCNC: 98 MMOL/L (ref 98–107)
CO2 SERPL-SCNC: 25 MMOL/L (ref 22–29)
CO2 SERPL-SCNC: 27 MMOL/L (ref 22–29)
CREAT SERPL-MCNC: 10.7 MG/DL (ref 0.5–1)
CREAT SERPL-MCNC: 11.3 MG/DL (ref 0.5–1)
EOSINOPHIL # BLD: 0.13 K/UL (ref 0.05–0.5)
EOSINOPHILS RELATIVE PERCENT: 2 % (ref 0–6)
ERYTHROCYTE [DISTWIDTH] IN BLOOD BY AUTOMATED COUNT: 16.3 % (ref 11.5–15)
ERYTHROCYTE [DISTWIDTH] IN BLOOD BY AUTOMATED COUNT: 16.4 % (ref 11.5–15)
GFR SERPL CREATININE-BSD FRML MDRD: 4 ML/MIN/1.73M2
GFR SERPL CREATININE-BSD FRML MDRD: 4 ML/MIN/1.73M2
GLUCOSE SERPL-MCNC: 152 MG/DL (ref 74–99)
GLUCOSE SERPL-MCNC: 345 MG/DL (ref 74–99)
HCT VFR BLD AUTO: 19.5 % (ref 34–48)
HCT VFR BLD AUTO: 20.9 % (ref 34–48)
HGB BLD-MCNC: 6.4 G/DL (ref 11.5–15.5)
HGB BLD-MCNC: 6.8 G/DL (ref 11.5–15.5)
IMM GRANULOCYTES # BLD AUTO: 0.04 K/UL (ref 0–0.58)
IMM GRANULOCYTES NFR BLD: 1 % (ref 0–5)
INR PPP: 1
LACTATE BLDV-SCNC: 1.4 MMOL/L (ref 0.5–2.2)
LYMPHOCYTES NFR BLD: 1.15 K/UL (ref 1.5–4)
LYMPHOCYTES RELATIVE PERCENT: 20 % (ref 20–42)
MCH RBC QN AUTO: 32.3 PG (ref 26–35)
MCH RBC QN AUTO: 32.4 PG (ref 26–35)
MCHC RBC AUTO-ENTMCNC: 32.5 G/DL (ref 32–34.5)
MCHC RBC AUTO-ENTMCNC: 32.8 G/DL (ref 32–34.5)
MCV RBC AUTO: 98.5 FL (ref 80–99.9)
MCV RBC AUTO: 99.5 FL (ref 80–99.9)
MONOCYTES NFR BLD: 0.43 K/UL (ref 0.1–0.95)
MONOCYTES NFR BLD: 7 % (ref 2–12)
NEUTROPHILS NFR BLD: 70 % (ref 43–80)
NEUTS SEG NFR BLD: 4.07 K/UL (ref 1.8–7.3)
PLATELET # BLD AUTO: 241 K/UL (ref 130–450)
PLATELET # BLD AUTO: 260 K/UL (ref 130–450)
PMV BLD AUTO: 9.5 FL (ref 7–12)
PMV BLD AUTO: 9.7 FL (ref 7–12)
POTASSIUM SERPL-SCNC: 4.3 MMOL/L (ref 3.5–5)
POTASSIUM SERPL-SCNC: 4.7 MMOL/L (ref 3.5–5)
PROT SERPL-MCNC: 7.2 G/DL (ref 6.4–8.3)
PROTHROMBIN TIME: 11.4 SEC (ref 9.3–12.4)
RBC # BLD AUTO: 1.98 M/UL (ref 3.5–5.5)
RBC # BLD AUTO: 2.1 M/UL (ref 3.5–5.5)
SODIUM SERPL-SCNC: 137 MMOL/L (ref 132–146)
SODIUM SERPL-SCNC: 138 MMOL/L (ref 132–146)
TROPONIN I SERPL HS-MCNC: 167 NG/L (ref 0–9)
TROPONIN I SERPL HS-MCNC: 170 NG/L (ref 0–9)
WBC OTHER # BLD: 5.8 K/UL (ref 4.5–11.5)
WBC OTHER # BLD: 5.9 K/UL (ref 4.5–11.5)

## 2024-01-30 PROCEDURE — 99285 EMERGENCY DEPT VISIT HI MDM: CPT

## 2024-01-30 PROCEDURE — 71046 X-RAY EXAM CHEST 2 VIEWS: CPT

## 2024-01-30 PROCEDURE — G0378 HOSPITAL OBSERVATION PER HR: HCPCS

## 2024-01-30 PROCEDURE — 86900 BLOOD TYPING SEROLOGIC ABO: CPT

## 2024-01-30 PROCEDURE — 80048 BASIC METABOLIC PNL TOTAL CA: CPT

## 2024-01-30 PROCEDURE — 86901 BLOOD TYPING SEROLOGIC RH(D): CPT

## 2024-01-30 PROCEDURE — 85027 COMPLETE CBC AUTOMATED: CPT

## 2024-01-30 PROCEDURE — 86923 COMPATIBILITY TEST ELECTRIC: CPT

## 2024-01-30 PROCEDURE — 85610 PROTHROMBIN TIME: CPT

## 2024-01-30 PROCEDURE — 83605 ASSAY OF LACTIC ACID: CPT

## 2024-01-30 PROCEDURE — 84484 ASSAY OF TROPONIN QUANT: CPT

## 2024-01-30 PROCEDURE — 83880 ASSAY OF NATRIURETIC PEPTIDE: CPT

## 2024-01-30 PROCEDURE — 93005 ELECTROCARDIOGRAM TRACING: CPT | Performed by: EMERGENCY MEDICINE

## 2024-01-30 PROCEDURE — 86850 RBC ANTIBODY SCREEN: CPT

## 2024-01-30 PROCEDURE — 99222 1ST HOSP IP/OBS MODERATE 55: CPT | Performed by: INTERNAL MEDICINE

## 2024-01-30 PROCEDURE — 82248 BILIRUBIN DIRECT: CPT

## 2024-01-30 PROCEDURE — 85025 COMPLETE CBC W/AUTO DIFF WBC: CPT

## 2024-01-30 PROCEDURE — 80053 COMPREHEN METABOLIC PANEL: CPT

## 2024-01-30 RX ORDER — INSULIN LISPRO 100 [IU]/ML
4 INJECTION, SOLUTION INTRAVENOUS; SUBCUTANEOUS
Status: DISCONTINUED | OUTPATIENT
Start: 2024-01-30 | End: 2024-02-02 | Stop reason: HOSPADM

## 2024-01-30 RX ORDER — GLUCAGON 1 MG/ML
1 KIT INJECTION PRN
Status: DISCONTINUED | OUTPATIENT
Start: 2024-01-30 | End: 2024-02-02 | Stop reason: HOSPADM

## 2024-01-30 RX ORDER — INSULIN LISPRO 100 [IU]/ML
0-4 INJECTION, SOLUTION INTRAVENOUS; SUBCUTANEOUS
Status: DISCONTINUED | OUTPATIENT
Start: 2024-01-30 | End: 2024-02-02 | Stop reason: HOSPADM

## 2024-01-30 RX ORDER — CARVEDILOL 25 MG/1
50 TABLET ORAL EVERY MORNING
Status: DISCONTINUED | OUTPATIENT
Start: 2024-01-31 | End: 2024-02-02 | Stop reason: HOSPADM

## 2024-01-30 RX ORDER — SODIUM CHLORIDE 9 MG/ML
INJECTION, SOLUTION INTRAVENOUS PRN
Status: DISCONTINUED | OUTPATIENT
Start: 2024-01-30 | End: 2024-02-02 | Stop reason: HOSPADM

## 2024-01-30 RX ORDER — FAMOTIDINE 20 MG/1
20 TABLET, FILM COATED ORAL EVERY MORNING
Status: DISCONTINUED | OUTPATIENT
Start: 2024-01-31 | End: 2024-01-31

## 2024-01-30 RX ORDER — INSULIN GLARGINE 100 [IU]/ML
8 INJECTION, SOLUTION SUBCUTANEOUS NIGHTLY
Status: DISCONTINUED | OUTPATIENT
Start: 2024-01-30 | End: 2024-02-02 | Stop reason: HOSPADM

## 2024-01-30 RX ORDER — INSULIN LISPRO 100 [IU]/ML
0-4 INJECTION, SOLUTION INTRAVENOUS; SUBCUTANEOUS NIGHTLY
Status: DISCONTINUED | OUTPATIENT
Start: 2024-01-30 | End: 2024-02-02 | Stop reason: HOSPADM

## 2024-01-30 RX ORDER — CARVEDILOL 25 MG/1
25 TABLET ORAL
Status: DISCONTINUED | OUTPATIENT
Start: 2024-01-30 | End: 2024-02-02 | Stop reason: HOSPADM

## 2024-01-30 RX ORDER — DEXTROSE MONOHYDRATE 100 MG/ML
INJECTION, SOLUTION INTRAVENOUS CONTINUOUS PRN
Status: DISCONTINUED | OUTPATIENT
Start: 2024-01-30 | End: 2024-02-02 | Stop reason: HOSPADM

## 2024-01-30 NOTE — H&P
[facility] Hospitalist Group History and Physical          PCP: May Waters DO    Date of Admission: 1/30/2024    Date of Service: Pt seen/examined on 1/30/2024 and is admitted to Inpatient with expected LOS greater than two midnights due to medical therapy.     Chief Complaint:  had concerns including Abnormal Lab (Pt received call from and reported HGB 7.1).    History Of Present Illness:    Ms. Michelle Nettles, a 38 y.o. year old female  who  has a past medical history of JOLLY (acute kidney injury) ([facility]), AVF (arteriovenous fistula) ([facility]), Chronic kidney disease, Dialysis AV fistula malfunction, initial encounter ([facility]), DM type 1 (diabetes mellitus, type 1) ([facility]), Encounter regarding vascular access for dialysis for ESRD ([facility]), ESRD (end stage renal disease) ([facility]), Hemodialysis patient ([facility]), Hypertension, and Tobacco abuse. Patient has a history of ESRD presents to the emergency department due to abnormal lab.  Patient sent to the emergency department by dialysis clinic due to hemoglobin of 7.1.  Patient also states she has been feeling shortness of breath over the last 1 week.  She was recently admitted for similar symptoms and hemoglobin at that time was noted to be 6.9 which improved with blood transfusion.  She was dialyzed the next day and discharged home in stable condition.  Patient denies any history of GI bleed.  She does state her stools are dark, but she denies any overt blood in her stool.  She states she took ibuprofen for a couple days about a month ago every 6 hours for a tooth infection, she denies any NSAID use since then.  She denies any chest pain, fevers, chills.  She wears 3 to 4 L of oxygen at baseline.  She also smokes about 3 to 4 cigarettes/day, but has not been smoking recently due to shortness of breath.    On arrival to the ED, patient was hemodynamically stable. Lab work was remarkable for creatinine of 10.7 and hemoglobin of 6.4. Electrolytes were stable.  1.4 01/30/2024     Thyroid Studies:   Lab Results   Component Value Date    TSH 2.900 03/24/2023       Oupatient labs:  Lab Results   Component Value Date    CHOL 155 10/08/2013    TRIG 68 10/08/2013    HDL 40 01/29/2021    LDLCALC 41 01/29/2021    TSH 2.900 03/24/2023    INR 1.0 01/30/2024    LABA1C 6.6 11/07/2023       Urinalysis:    Lab Results   Component Value Date/Time    NITRU Negative 11/23/2017 10:05 PM    WBCUA 2-5 11/23/2017 10:05 PM    WBCUA NONE 11/21/2010 10:30 PM    BACTERIA RARE 11/23/2017 10:05 PM    RBCUA 5-10 11/23/2017 10:05 PM    RBCUA 0-1 09/29/2013 02:15 PM    BLOODU MODERATE 11/23/2017 10:05 PM    SPECGRAV 1.015 11/23/2017 10:05 PM    GLUCOSEU 500 11/23/2017 10:05 PM    GLUCOSEU >=1000 11/21/2010 10:30 PM       Imaging:  XR CHEST (2 VW)    Result Date: 1/30/2024  EXAMINATION: TWO XRAY VIEWS OF THE CHEST 1/30/2024 4:50 pm COMPARISON: January 6, 2024 HISTORY: ORDERING SYSTEM PROVIDED HISTORY: sob TECHNOLOGIST PROVIDED HISTORY: Reason for exam:->sob What reading provider will be dictating this exam?->CRC FINDINGS: The heart is mildly enlarged.  Pulmonary vessels are prominent.  There is mildly increased opacity in the lower lungs, worse on the right.  There is no pneumothorax or pleural effusion.     1. Mild cardiomegaly and pulmonary vascular congestion. 2. Mild increased opacity in the lower lungs, worse on the right, which may represent atelectasis or developing pneumonia.  Follow-up study until complete resolution recommended.     XR CHEST PORTABLE    Result Date: 1/6/2024  EXAMINATION: ONE XRAY VIEW OF THE CHEST 1/6/2024 12:22 am COMPARISON: 8-23, 7-23 HISTORY: ORDERING SYSTEM PROVIDED HISTORY: sob TECHNOLOGIST PROVIDED HISTORY: Reason for exam:->sob What reading provider will be dictating this exam?->CRC FINDINGS: Technique and body habitus limit the exam with underexposure.  Probable in addition to this opacification of lower through mid to upper at least right-side the may represent

## 2024-01-30 NOTE — ED PROVIDER NOTES
[facility] EMERGENCY DEPARTMENT  EMERGENCY DEPARTMENT ENCOUNTER        Pt Name: Michelle Nettles  MRN: [id number]  Birthdate 1985  Date of evaluation: 1/30/2024  Provider: Maria Ines Montiel DO  PCP: May Waters DO  Note Started: 6:52 PM EST 1/30/24    CHIEF COMPLAINT       Chief Complaint   Patient presents with    Abnormal Lab     Pt received call from and reported HGB 7.1       HISTORY OF PRESENT ILLNESS: 1 or more Elements        Limitations to history : None    Michelle Nettles is a 38 y.o. female with end-stage renal disease on dialysis, Tuesday, Thursday and Saturday with last treatment on Saturday brought in by EMS for evaluation of shortness of breath.  She was told on her Saturday dialysis that her hemoglobin was 7.1.  She had a normal dialysis treatment then.  Today she felt so short of breath that she could not make it to dialysis and called EMS to be brought here.  She has had diffuse chest pain for the past few days.  Denies fever or chills.  Denies nausea vomiting.  Denies palpitations or diaphoresis.  She is normally on 2 L of supplemental oxygen.      Nursing Notes were all reviewed and agreed with or any disagreements were addressed in the HPI.      REVIEW OF EXTERNAL NOTE :       Reviewed previous office visit on 1/8/2024 with primary care      Chart Review/External Note Review    Last Echo reviewed by Me:  Lab Results   Component Value Date    LVEF 63 06/30/2023    LVEFMODE Echo 08/18/2016             Controlled Substance Monitoring:    Acute and Chronic Pain Monitoring:   RX Monitoring Acute Pain Prescriptions Periodic Controlled Substance Monitoring   9/21/2021  12:05 PM Prescription exceeds daily limit for a specific reason. See comments or note. Possible medication side effects, risk of tolerance/dependence & alternative treatments discussed.           REVIEW OF SYSTEMS :      Positives and Pertinent negatives as per HPI.  had diffuse chest pain for the past few days.  Denies fever or chills.  Denies nausea vomiting.  Denies palpitations or diaphoresis.  She is normally on 2 L of supplemental oxygen.    Laboratory evaluation shows a CBC with a hemoglobin of 6.4, platelet 241, lactic 1.4, INR 1.0, hepatic function panel is unremarkable, BMP shows a potassium 4.7, sodium 138, chloride 98, creatinine 10.7, initial troponin was 170, proBNP is 21 502, patient was typed and crossed for 1 unit.  She will be transfused.  Case was discussed with both Dr. Gerard and Dr. Rivas.  Patient will be admitted for further evaluation and treatment    Patient is placed on the cardiac monitor and continuous pulse ox to monitor rhythm and vitals. EKG is ordered to evaluate patient's current cardiac rhythm, and to interpret QT interval prior to administering medications. CBC is ordered to evaluate for any signs of infection or inflammation by obtaining a WBC count, or any signs of acute anemia by interpreting hemoglobin. BMP for any electrolyte imbalances, kidney function, or any elevations in anion gap. Troponin as a marker for myocardial ischemia or heart strain. Lactic acid as a marker of hypoperfusion or ischemia. Protime-INR to evaluate for coagulopathy and as a marker for liver synthetic function. BNP to evaluate for heart failure or as a marker for heart strain. Hepatic function panel to assess for hepatitis, liver failure or other biliary disease. Chest x-ray for any possible signs of, but without limitation to, pneumonia, pleural effusions, cardiomegaly, pneumothorax, atelectasis, rib or sternal abnormalities including fractures.      CONSULTS:   IP CONSULT TO NEPHROLOGY  IP CONSULT TO INTERNAL MEDICINE        PROCEDURES   Unless otherwise noted below, none       CRITICAL CARE TIME (.cct)   Critical Care  I spent a total of  31 minutes of critical care time in the evaluation and management of this patient. There was a high probability of clinically

## 2024-01-31 ENCOUNTER — APPOINTMENT (OUTPATIENT)
Dept: GENERAL RADIOLOGY | Age: 39
End: 2024-01-31
Payer: MEDICARE

## 2024-01-31 LAB
ANION GAP SERPL CALCULATED.3IONS-SCNC: 17 MMOL/L (ref 7–16)
B.E.: 1.5 MMOL/L (ref -3–3)
BUN SERPL-MCNC: 19 MG/DL (ref 6–20)
CALCIUM SERPL-MCNC: 8.7 MG/DL (ref 8.6–10.2)
CHLORIDE SERPL-SCNC: 89 MMOL/L (ref 98–107)
CO2 SERPL-SCNC: 25 MMOL/L (ref 22–29)
COHB: 0.8 % (ref 0–1.5)
CREAT SERPL-MCNC: 4.9 MG/DL (ref 0.5–1)
CRITICAL: ABNORMAL
DATE ANALYZED: ABNORMAL
DATE OF COLLECTION: ABNORMAL
ERYTHROCYTE [DISTWIDTH] IN BLOOD BY AUTOMATED COUNT: 17.1 % (ref 11.5–15)
GFR SERPL CREATININE-BSD FRML MDRD: 11 ML/MIN/1.73M2
GLUCOSE BLD-MCNC: 176 MG/DL (ref 74–99)
GLUCOSE BLD-MCNC: 287 MG/DL (ref 74–99)
GLUCOSE BLD-MCNC: 297 MG/DL (ref 74–99)
GLUCOSE BLD-MCNC: 349 MG/DL (ref 74–99)
GLUCOSE BLD-MCNC: 92 MG/DL (ref 74–99)
GLUCOSE SERPL-MCNC: 290 MG/DL (ref 74–99)
HCO3: 25.1 MMOL/L (ref 22–26)
HCT VFR BLD AUTO: 24.6 % (ref 34–48)
HGB BLD-MCNC: 8 G/DL (ref 11.5–15.5)
HHB: 3.6 % (ref 0–5)
LAB: ABNORMAL
Lab: 1350
MCH RBC QN AUTO: 31 PG (ref 26–35)
MCHC RBC AUTO-ENTMCNC: 32.5 G/DL (ref 32–34.5)
MCV RBC AUTO: 95.3 FL (ref 80–99.9)
METHB: 0.6 % (ref 0–1.5)
MODE: ABNORMAL
O2 CONTENT: 11.4 ML/DL
O2 SATURATION: 96.3 % (ref 92–98.5)
O2HB: 95 % (ref 94–97)
OPERATOR ID: 2856
PATIENT TEMP: 37 C
PCO2: 35.4 MMHG (ref 35–45)
PH BLOOD GAS: 7.47 (ref 7.35–7.45)
PLATELET # BLD AUTO: 264 K/UL (ref 130–450)
PMV BLD AUTO: 9.5 FL (ref 7–12)
PO2: 86.8 MMHG (ref 75–100)
POTASSIUM SERPL-SCNC: 3.8 MMOL/L (ref 3.5–5)
RBC # BLD AUTO: 2.58 M/UL (ref 3.5–5.5)
SODIUM SERPL-SCNC: 131 MMOL/L (ref 132–146)
SOURCE, BLOOD GAS: ABNORMAL
THB: 8.4 G/DL (ref 11.5–16.5)
TIME ANALYZED: 1353
WBC OTHER # BLD: 8.5 K/UL (ref 4.5–11.5)

## 2024-01-31 PROCEDURE — 6370000000 HC RX 637 (ALT 250 FOR IP): Performed by: INTERNAL MEDICINE

## 2024-01-31 PROCEDURE — 82962 GLUCOSE BLOOD TEST: CPT

## 2024-01-31 PROCEDURE — 2580000003 HC RX 258: Performed by: INTERNAL MEDICINE

## 2024-01-31 PROCEDURE — G0378 HOSPITAL OBSERVATION PER HR: HCPCS

## 2024-01-31 PROCEDURE — 36430 TRANSFUSION BLD/BLD COMPNT: CPT

## 2024-01-31 PROCEDURE — 71045 X-RAY EXAM CHEST 1 VIEW: CPT

## 2024-01-31 PROCEDURE — 36415 COLL VENOUS BLD VENIPUNCTURE: CPT

## 2024-01-31 PROCEDURE — 99231 SBSQ HOSP IP/OBS SF/LOW 25: CPT | Performed by: STUDENT IN AN ORGANIZED HEALTH CARE EDUCATION/TRAINING PROGRAM

## 2024-01-31 PROCEDURE — 85027 COMPLETE CBC AUTOMATED: CPT

## 2024-01-31 PROCEDURE — P9016 RBC LEUKOCYTES REDUCED: HCPCS

## 2024-01-31 PROCEDURE — 80048 BASIC METABOLIC PNL TOTAL CA: CPT

## 2024-01-31 PROCEDURE — 6370000000 HC RX 637 (ALT 250 FOR IP)

## 2024-01-31 PROCEDURE — 90935 HEMODIALYSIS ONE EVALUATION: CPT

## 2024-01-31 PROCEDURE — 82805 BLOOD GASES W/O2 SATURATION: CPT

## 2024-01-31 PROCEDURE — 36600 WITHDRAWAL OF ARTERIAL BLOOD: CPT

## 2024-01-31 RX ORDER — CALCIUM CARBONATE 500 MG/1
500 TABLET, CHEWABLE ORAL 3 TIMES DAILY PRN
Status: DISCONTINUED | OUTPATIENT
Start: 2024-01-31 | End: 2024-02-02 | Stop reason: HOSPADM

## 2024-01-31 RX ORDER — ONDANSETRON 4 MG/1
4 TABLET, ORALLY DISINTEGRATING ORAL EVERY 8 HOURS PRN
Status: DISCONTINUED | OUTPATIENT
Start: 2024-01-31 | End: 2024-02-02 | Stop reason: HOSPADM

## 2024-01-31 RX ORDER — PANTOPRAZOLE SODIUM 40 MG/1
40 TABLET, DELAYED RELEASE ORAL
Status: DISCONTINUED | OUTPATIENT
Start: 2024-01-31 | End: 2024-02-02 | Stop reason: HOSPADM

## 2024-01-31 RX ORDER — SODIUM CHLORIDE 0.9 % (FLUSH) 0.9 %
5-40 SYRINGE (ML) INJECTION EVERY 12 HOURS SCHEDULED
Status: DISCONTINUED | OUTPATIENT
Start: 2024-01-31 | End: 2024-02-02 | Stop reason: HOSPADM

## 2024-01-31 RX ORDER — SODIUM CHLORIDE 9 MG/ML
INJECTION, SOLUTION INTRAVENOUS PRN
Status: DISCONTINUED | OUTPATIENT
Start: 2024-01-31 | End: 2024-02-02 | Stop reason: HOSPADM

## 2024-01-31 RX ORDER — LANOLIN ALCOHOL/MO/W.PET/CERES
3 CREAM (GRAM) TOPICAL NIGHTLY PRN
Status: DISCONTINUED | OUTPATIENT
Start: 2024-01-31 | End: 2024-02-02 | Stop reason: HOSPADM

## 2024-01-31 RX ORDER — ACETAMINOPHEN 325 MG/1
650 TABLET ORAL EVERY 6 HOURS PRN
Status: DISCONTINUED | OUTPATIENT
Start: 2024-01-31 | End: 2024-02-02 | Stop reason: HOSPADM

## 2024-01-31 RX ORDER — MIDODRINE HYDROCHLORIDE 5 MG/1
5 TABLET ORAL
Status: DISCONTINUED | OUTPATIENT
Start: 2024-01-31 | End: 2024-02-02 | Stop reason: HOSPADM

## 2024-01-31 RX ORDER — SODIUM CHLORIDE 0.9 % (FLUSH) 0.9 %
5-40 SYRINGE (ML) INJECTION PRN
Status: DISCONTINUED | OUTPATIENT
Start: 2024-01-31 | End: 2024-02-02 | Stop reason: HOSPADM

## 2024-01-31 RX ORDER — POLYETHYLENE GLYCOL 3350 17 G/17G
17 POWDER, FOR SOLUTION ORAL DAILY PRN
Status: DISCONTINUED | OUTPATIENT
Start: 2024-01-31 | End: 2024-02-02 | Stop reason: HOSPADM

## 2024-01-31 RX ORDER — ACETAMINOPHEN 650 MG/1
650 SUPPOSITORY RECTAL EVERY 6 HOURS PRN
Status: DISCONTINUED | OUTPATIENT
Start: 2024-01-31 | End: 2024-02-02 | Stop reason: HOSPADM

## 2024-01-31 RX ORDER — ONDANSETRON 2 MG/ML
4 INJECTION INTRAMUSCULAR; INTRAVENOUS EVERY 6 HOURS PRN
Status: DISCONTINUED | OUTPATIENT
Start: 2024-01-31 | End: 2024-02-02 | Stop reason: HOSPADM

## 2024-01-31 RX ADMIN — INSULIN LISPRO 4 UNITS: 100 INJECTION, SOLUTION INTRAVENOUS; SUBCUTANEOUS at 16:09

## 2024-01-31 RX ADMIN — PANTOPRAZOLE SODIUM 40 MG: 40 TABLET, DELAYED RELEASE ORAL at 16:09

## 2024-01-31 RX ADMIN — CARVEDILOL 50 MG: 25 TABLET, FILM COATED ORAL at 12:15

## 2024-01-31 RX ADMIN — Medication 10 ML: at 21:32

## 2024-01-31 RX ADMIN — INSULIN LISPRO 2 UNITS: 100 INJECTION, SOLUTION INTRAVENOUS; SUBCUTANEOUS at 12:16

## 2024-01-31 RX ADMIN — FAMOTIDINE 20 MG: 20 TABLET, FILM COATED ORAL at 12:15

## 2024-01-31 RX ADMIN — INSULIN LISPRO 4 UNITS: 100 INJECTION, SOLUTION INTRAVENOUS; SUBCUTANEOUS at 21:32

## 2024-01-31 RX ADMIN — INSULIN LISPRO 2 UNITS: 100 INJECTION, SOLUTION INTRAVENOUS; SUBCUTANEOUS at 16:09

## 2024-01-31 RX ADMIN — INSULIN LISPRO 4 UNITS: 100 INJECTION, SOLUTION INTRAVENOUS; SUBCUTANEOUS at 12:15

## 2024-01-31 RX ADMIN — INSULIN GLARGINE 8 UNITS: 100 INJECTION, SOLUTION SUBCUTANEOUS at 21:32

## 2024-01-31 ASSESSMENT — ENCOUNTER SYMPTOMS
CHEST TIGHTNESS: 0
NAUSEA: 0
COUGH: 0
VOMITING: 0
DIARRHEA: 0
ABDOMINAL PAIN: 0
SHORTNESS OF BREATH: 0

## 2024-01-31 ASSESSMENT — PAIN SCALES - GENERAL
PAINLEVEL_OUTOF10: 0

## 2024-01-31 NOTE — PROGRESS NOTES
Patient arrived to floor not breathing well. O2 on RA only 75% placed on 6L to keep o2 above 92%

## 2024-01-31 NOTE — ED NOTES
Patient states that she feels like her sugar is low. Bgl is 92. She states that she is low and wants something to eat

## 2024-01-31 NOTE — ED NOTES
Pt care report called to JENNIFER Thompson of [id number], who had no further questions at this time. Dialysis tech notified of new admission room and will set up transport to that room upon completion of dialysis.

## 2024-01-31 NOTE — CONSULTS
GENERAL SURGERY  CONSULT NOTE  2024    Physician Consulted: Dr. Brower  Reason for Consult: acute on chronic anemia  Referring Physician: Dr. Osvaldo ORDONEZ Lencho Nettles is a 38 y.o. female who presents for evaluation of dyspnea and fatigue. She states she was craving ice. General surgery was consulted for concern for acute drop in Hb. Pt states she has been having dark tarry stools in the past but thought this was due to the fact that she takes iron. However, she states that she was told to stop the iron and still was having some dark stools. She states she has had sharp abdominal pain that comes and goes, she does not currently have this pain. She states that she did have feelings of nausea without emesis. She states she does smoke but has been cutting back and smokes less than 1 pack a week. Denies any alcohol use. States she had been using NSAIDs more than normal around 1 month ago for a tooth infection. Pt denies previous EGD or colonoscopies. She denies any previous abdominal surgeries. Pt states her dad  of gastric cancer in .    Her Hb is 6.5 and responded appropriately to 8. Baseline Hb appears to be around 8 from chart review.    Past Medical History:   Diagnosis Date    JOLLY (acute kidney injury) ([facility]) 2016    AVF (arteriovenous fistula) ([facility]) 2018    let arm    Chronic kidney disease     Dialysis AV fistula malfunction, initial encounter ([facility]) 2019    DM type 1 (diabetes mellitus, type 1) ([facility])     Encounter regarding vascular access for dialysis for ESRD ([facility]) 2018    ESRD (end stage renal disease) ([facility])     Hemodialysis patient ([facility])     gabbysinus eunice  / graft in left arm    Hypertension     Tobacco abuse 2016       Past Surgical History:   Procedure Laterality Date     SECTION  2013    CYST INCISION AND DRAINAGE  2011    perirectal abscess. [facility]. Dr. Fleming    DIALYSIS FISTULA CREATION Left 2019    LEFT ARM FISTULAGRAM,    Abdomen:  soft, nontender, nondistended   Extremities: atraumatic, no focal motor deficits  Female Rectal: No hemorrhoids, normal rectal tone, no masses, brown stool present on HAL, FOBT+    LABS:    CBC  Recent Labs     01/31/24  1204   WBC 8.5   HGB 8.0*   HCT 24.6*        BMP  Recent Labs     01/31/24  1204   *   K 3.8   CL 89*   CO2 25   BUN 19   CREATININE 4.9*   CALCIUM 8.7     Liver Function  Recent Labs     01/30/24  1620   BILITOT 0.3   BILIDIR <0.2   AST 22   ALT 14   ALKPHOS 94   PROT 7.2   LABALBU 3.7     No results for input(s): \"LACTATE\" in the last 72 hours.  Recent Labs     01/30/24  1620   INR 1.0       RADIOLOGY    XR CHEST (2 VW)    Result Date: 1/30/2024  EXAMINATION: TWO XRAY VIEWS OF THE CHEST 1/30/2024 4:50 pm COMPARISON: January 6, 2024 HISTORY: ORDERING SYSTEM PROVIDED HISTORY: sob TECHNOLOGIST PROVIDED HISTORY: Reason for exam:->sob What reading provider will be dictating this exam?->CRC FINDINGS: The heart is mildly enlarged.  Pulmonary vessels are prominent.  There is mildly increased opacity in the lower lungs, worse on the right.  There is no pneumothorax or pleural effusion.     1. Mild cardiomegaly and pulmonary vascular congestion. 2. Mild increased opacity in the lower lungs, worse on the right, which may represent atelectasis or developing pneumonia.  Follow-up study until complete resolution recommended.         ASSESSMENT:  38 y.o. female with acute on chronic anemia    PLAN:   - will monitor Hb   - plan for EGD 2/1 with Dr. Brower   - NPO at midnight   - protonix BID instead of pepcid      Maty Ponce, DO  General Surgery PGY-1  1/31/24 at 1:02 PM EST       Attending Attestation   I saw and examined the patient, I agree with resident note      Hx taken from patient    I personally reviewed the CXR b/l pulmonary congestion.     39 yo with ESRD on HD with acute on chronic anemia,     Plan for EGD today.  Patient is in agreement.     Dominic Brower MD FACS

## 2024-01-31 NOTE — PROGRESS NOTES
[facility] Hospitalist Progress Note    SYNOPSIS: Patient admitted on 2024 for Acute on chronic anemia  Chronic kidney disease, Dialysis AV fistula malfunction, initial encounter ([facility]), DM type 1 (diabetes mellitus, type 1) ([facility]), Encounter regarding vascular access for dialysis for ESRD ([facility]), ESRD (end stage renal disease) ([facility]), Hemodialysis patient ([facility]), Hypertension, and Tobacco abuse. Patient has a history of ESRD presents to the emergency department due to abnormal lab.  Patient sent to the emergency department by dialysis clinic due to hemoglobin of 7.1.  Patient also states she has been feeling shortness of breath over the last 1 week.  She was recently admitted for similar symptoms and hemoglobin at that time was noted to be 6.9 which improved with blood transfusion.  She was dialyzed the next day and discharged home in stable condition.  Patient denies any history of GI bleed.  She does state her stools are dark, but she denies any overt blood in her stool.  She states she took ibuprofen for a couple days about a month ago every 6 hours for a tooth infection, she denies any NSAID use since then.  She denies any chest pain, fevers, chills.  She wears 3 to 4 L of oxygen at baseline.  She also smokes about 3 to 4 cigarettes/day, but has not been smoking recently due to shortness of breath.   -patient got the blood transfusion during dialysis ; was on 2 Lo2 by NC during dialysis;  Later o2 requirement increased to 6 L o2 by NC; patient is tachycardic too; bp117/56  Ordered CXR and abg    SUBJECTIVE:  Stable overnight. No other overnight issues reported.   Patient seen and examined the patient ; during dialysis ; denies any complains; on 2 Lo2 by NC  Records reviewed.           Temp (24hrs), Av.1 °F (36.7 °C), Min:97 °F (36.1 °C), Max:98.5 °F (36.9 °C)    DIET: ADULT DIET; Regular; 3 carb choices (45 gm/meal); Low Sodium (2 gm); Low Potassium (Less than 3000 mg/day)  CODE: Full

## 2024-01-31 NOTE — PROGRESS NOTES
4 Eyes Skin Assessment     NAME:  Michelle Nettles  YOB: 1985  MEDICAL RECORD NUMBER:  [id number]    The patient is being assessed for  Admission    I agree that at least one RN has performed a thorough Head to Toe Skin Assessment on the patient. ALL assessment sites listed below have been assessed.      Areas assessed by both nurses:    Head, Face, Ears, Shoulders, Back, Chest, Arms, Elbows, Hands, Sacrum. Buttock, Coccyx, Ischium, Legs. Feet and Heels, and Under Medical Devices         Does the Patient have a Wound? No noted wound(s)       Edgar Prevention initiated by RN: No  Wound Care Orders initiated by RN: No    Pressure Injury (Stage 3,4, Unstageable, DTI, NWPT, and Complex wounds) if present, place Wound referral order by RN under : No    New Ostomies, if present place, Ostomy referral order under : No     Nurse 1 eSignature: Electronically signed by Ena Rubi RN on 1/31/24 at 1:48 PM EST    **SHARE this note so that the co-signing nurse can place an eSignature**    Nurse 2 eSignature: Electronically signed by Holyl Harris RN on 1/31/24 at 5:01 PM EST

## 2024-01-31 NOTE — FLOWSHEET NOTE
01/31/24 1106   Vital Signs   BP (!) 143/71   Temp 97.3 °F (36.3 °C)   Pulse (!) 113   Respirations 16   Post-Hemodialysis Assessment   Post-Treatment Procedures Blood returned;Access bleeding time < 10 minutes   Machine Disinfection Process Exterior Machine Disinfection   Rinseback Volume (ml) 300 ml   Blood Volume Processed (Liters) 86.8 L   Dialyzer Clearance Lightly streaked   Duration of Treatment (minutes) 240 minutes   Heparin Amount Administered During Treatment (mL) 0 mL   Hemodialysis Intake (ml) 300 ml   Hemodialysis Output (ml) 3300 ml   NET Removed (ml) 3000   Tolerated Treatment Good   Patient Response to Treatment tolerated well, cramping at end of treatment, resolved with rinseback, blood returned, needles pulled, sites held, stasis achieved, bandaids applied, +thirll, +bruit

## 2024-01-31 NOTE — ED NOTES
Pt stated that she was feeling anxious. Vitals obtained showed to be stable. ER MD called bedside. Per ER MD Edwin, Pt is feeling so due to requiring dialysis. Pt transported of the floor at this time for dialysis. Pt alert and oriented with no other complaints at this time.

## 2024-01-31 NOTE — CARE COORDINATION
01/31/24 Transition of care:  Pt admitted from ED for Hgb of 7.1 Surgery consulted They plan for EGD until 02/01. Pt also has c/o of increasing SOB due to missed dialysis.  Pt does wear home O2 through [employer].  Met with pt to discuss transition of care and potential discharge needs Pt lives with her wheelchair bound daughter in a one story home Pt states that she is independent with ADLs and still drives but is requesting the CM set up home aides for help Pt [facility] and [facility] PCP is [facility] is [facility] on [address] Plan is to return home at discharge with family to transport CM to follow     Referral made to [facility] at [phone] and spoke with Yasemin pt will qualify for services based on questionnaire and she will send the referral to [facility] and they will call the patient to set up services Notified pt of above    Spoke with Osmel from [facility] about pt oxygen he states that the patient has an oxygen concentrator at home but refuses to use and asks for multiple tanks to be delivered to the home He indicates that their therapist has been out to the home multiple times to educate the patient on the use of the concentrator     Electronically signed by Epi Ramires RN CM on 1/31/2024 at 4:39 PM     Case Management Assessment  Initial Evaluation    Date/Time of Evaluation: 1/31/2024 4:39 PM  Assessment Completed by: Epi Ramires RN    If patient is discharged prior to next notation, then this note serves as note for discharge by case management.    Patient Name: Michelle Nettles                   YOB: 1985  Diagnosis: Pulmonary congestion [R09.89]  End stage renal disease on dialysis (HCC) [N18.6, Z99.2]  Acute on chronic anemia [D64.9]                   Date / Time: 1/30/2024  1:45 PM    Patient Admission Status: Observation   Readmission Risk (Low < 19, Mod (19-27), High > 27): Readmission Risk Score: 30.8    Current

## 2024-01-31 NOTE — CONSULTS
stone    Metabolic encephalopathy    Hypoxia    Hypotension    History of venous thromboembolism    Chronic systolic (congestive) heart failure    MRSA colonization    ESRD on hemodialysis (HCC)    SOB (shortness of breath)    Chronic hypoxic respiratory failure, on home oxygen therapy (HCC)    Acute on chronic anemia       Diet:    Diet NPO    Meds:     carvedilol  50 mg Oral QAM    famotidine  20 mg Oral QAM    insulin glargine  8 Units SubCUTAneous Nightly    insulin lispro  4 Units SubCUTAneous TID AC    insulin lispro  0-4 Units SubCUTAneous TID WC    insulin lispro  0-4 Units SubCUTAneous Nightly    carvedilol  25 mg Oral Dinner        sodium chloride      dextrose         Meds prn:     sodium chloride, glucose, dextrose bolus **OR** dextrose bolus, glucagon (rDNA), dextrose    Meds prior to admission:     No current facility-administered medications on file prior to encounter.     Current Outpatient Medications on File Prior to Encounter   Medication Sig Dispense Refill    famotidine (PEPCID) 20 MG tablet TAKE 1 TABLET BY MOUTH EVERY MORNING 30 tablet 3    Acetaminophen Extra Strength 500 MG TABS TAKE 1 TABLET BY MOUTH EVERY 6 HOURS AS NEEDED FOR PAIN 120 tablet 0    insulin lispro, 1 Unit Dial, (HUMALOG KWIKPEN) 100 UNIT/ML SOPN Inject 4 Units into the skin 3 times daily (before meals) 15 Adjustable Dose Pre-filled Pen Syringe 3    Insulin Glargine, 2 Unit Dial, (TOUJEO MAX SOLOSTAR) 300 UNIT/ML SOPN Inject 12 units at bedtime and (8 Units on the nights before dialysis) 1 Adjustable Dose Pre-filled Pen Syringe 1    blood glucose monitor strips Test four times a day & as needed for symptoms of irregular blood glucose. Dispense sufficient amount for indicated testing frequency plus additional to accommodate PRN testing needs. 200 strip 5    Insulin Pen Needle 32G X 4 MM MISC 1 each by Does not apply route 5 (five) times a day 1500 each 5    clopidogrel (PLAVIX) 75 MG tablet Take 1 tablet by mouth daily for 180        Recent Labs     01/30/24  1620   LABALBU 3.7       Ferritin   Date Value Ref Range Status   08/30/2022 180 (A) 9.0 - 150.0 ng/mL Final     Iron   Date Value Ref Range Status   03/24/2023 78 37 - 145 mcg/dL Final     TIBC   Date Value Ref Range Status   03/24/2023 196 (L) 250 - 450 mcg/dL Final       Vitamin B-12   Date Value Ref Range Status   03/24/2023 1021 (H) 211 - 946 pg/mL Final       Folate   Date Value Ref Range Status   03/24/2023 13.2 4.8 - 24.2 ng/mL Final       Lab Results   Component Value Date/Time    COLORU Yellow 11/23/2017 10:05 PM    NITRU Negative 11/23/2017 10:05 PM    GLUCOSEU 500 11/23/2017 10:05 PM    GLUCOSEU >=1000 11/21/2010 10:30 PM    KETUA Negative 11/23/2017 10:05 PM    UROBILINOGEN 0.2 11/23/2017 10:05 PM    BILIRUBINUR Negative 11/23/2017 10:05 PM    BILIRUBINUR NEGATIVE 11/21/2010 10:30 PM       No results found for: \"NACHO\", \"CREURRAN\", \"OSMOU\"    No components found for: \"URIC\"    No results found for: \"LIPIDPAN\"    Assessment and Plans:    ESRD on HD  Outpatient [facility] Vikash HARRISON via left arm AV fistula  Monitor labs   Continue HD while inpatient - HD today as she missed HD yesterday     2. Shortness of breath   Echo 6/2023 EF 60 to 65%, moderate tricuspid regurg  CXR 1/30- mild cardiomegaly and pulmonary vascular congestion, mild increased opacity in the lower lungs, worse on the right  proBNP 21,502  UF as tolerated with HD  Monitor volume status    3.  Anemia with CKD  Hemoglobin target 10-12  Hemoglobin 6.4 on 1/30--> await hemoglobin today  S/p PRBCs  Transfuse for hemoglobin<7  VALENTIN managed in the outpatient setting  General surgery consulted  Monitor H&H    4.  Secondary hyperparathyroidism of renal origin   and phosphorus 4 on 1/2 in the outpatient setting  Check phosphorus in the a.m.  Monitor labs    5.  Hypertension with CKD 5/ESRD  BP goal<130/80  BP above goal  Monitor BPs on current regimen and with HD    KEMI Lewis - CNP      Patient seen and

## 2024-01-31 NOTE — PROGRESS NOTES
Patient requesting someone  her [employer] door dash from front lobby. Patient educated on Renal diet and salt intake.

## 2024-01-31 NOTE — ED NOTES
Patient given a new o2 tank and provided a boxed lunch and blankets at this time. Patient notified that she is still waiting on a bed

## 2024-02-01 ENCOUNTER — ANESTHESIA (OUTPATIENT)
Dept: ENDOSCOPY | Age: 39
End: 2024-02-01
Payer: MEDICARE

## 2024-02-01 ENCOUNTER — ANESTHESIA EVENT (OUTPATIENT)
Dept: ENDOSCOPY | Age: 39
End: 2024-02-01
Payer: MEDICARE

## 2024-02-01 ENCOUNTER — APPOINTMENT (OUTPATIENT)
Dept: GENERAL RADIOLOGY | Age: 39
End: 2024-02-01
Payer: MEDICARE

## 2024-02-01 LAB
ABO/RH: NORMAL
ANION GAP SERPL CALCULATED.3IONS-SCNC: 11 MMOL/L (ref 7–16)
ANTIBODY SCREEN: NEGATIVE
ARM BAND NUMBER: NORMAL
BLOOD BANK BLOOD PRODUCT EXPIRATION DATE: NORMAL
BLOOD BANK DISPENSE STATUS: NORMAL
BLOOD BANK ISBT PRODUCT BLOOD TYPE: 5100
BLOOD BANK PRODUCT CODE: NORMAL
BLOOD BANK SAMPLE EXPIRATION: NORMAL
BLOOD BANK UNIT TYPE AND RH: NORMAL
BPU ID: NORMAL
BUN SERPL-MCNC: 39 MG/DL (ref 6–20)
CALCIUM SERPL-MCNC: 8.9 MG/DL (ref 8.6–10.2)
CHLORIDE SERPL-SCNC: 98 MMOL/L (ref 98–107)
CO2 SERPL-SCNC: 29 MMOL/L (ref 22–29)
COMPONENT: NORMAL
CREAT SERPL-MCNC: 7.6 MG/DL (ref 0.5–1)
CROSSMATCH RESULT: NORMAL
EKG ATRIAL RATE: 104 BPM
EKG P AXIS: 41 DEGREES
EKG P-R INTERVAL: 128 MS
EKG Q-T INTERVAL: 332 MS
EKG QRS DURATION: 70 MS
EKG QTC CALCULATION (BAZETT): 436 MS
EKG R AXIS: 15 DEGREES
EKG T AXIS: 99 DEGREES
EKG VENTRICULAR RATE: 104 BPM
ERYTHROCYTE [DISTWIDTH] IN BLOOD BY AUTOMATED COUNT: 17.9 % (ref 11.5–15)
GFR SERPL CREATININE-BSD FRML MDRD: 6 ML/MIN/1.73M2
GLUCOSE BLD-MCNC: 135 MG/DL (ref 74–99)
GLUCOSE BLD-MCNC: 162 MG/DL (ref 74–99)
GLUCOSE BLD-MCNC: 270 MG/DL (ref 74–99)
GLUCOSE BLD-MCNC: 326 MG/DL (ref 74–99)
GLUCOSE BLD-MCNC: 465 MG/DL (ref 74–99)
GLUCOSE SERPL-MCNC: 252 MG/DL (ref 74–99)
HCT VFR BLD AUTO: 22.8 % (ref 34–48)
HGB BLD-MCNC: 7.5 G/DL (ref 11.5–15.5)
MAGNESIUM SERPL-MCNC: 2.3 MG/DL (ref 1.6–2.6)
MCH RBC QN AUTO: 32.1 PG (ref 26–35)
MCHC RBC AUTO-ENTMCNC: 32.9 G/DL (ref 32–34.5)
MCV RBC AUTO: 97.4 FL (ref 80–99.9)
PHOSPHATE SERPL-MCNC: 6 MG/DL (ref 2.5–4.5)
PLATELET # BLD AUTO: 269 K/UL (ref 130–450)
PMV BLD AUTO: 9.8 FL (ref 7–12)
POTASSIUM SERPL-SCNC: 5.2 MMOL/L (ref 3.5–5)
RBC # BLD AUTO: 2.34 M/UL (ref 3.5–5.5)
SODIUM SERPL-SCNC: 138 MMOL/L (ref 132–146)
TRANSFUSION STATUS: NORMAL
UNIT DIVISION: 0
UNIT ISSUE DATE/TIME: NORMAL
WBC OTHER # BLD: 5.7 K/UL (ref 4.5–11.5)

## 2024-02-01 PROCEDURE — G0378 HOSPITAL OBSERVATION PER HR: HCPCS

## 2024-02-01 PROCEDURE — 7100000000 HC PACU RECOVERY - FIRST 15 MIN: Performed by: SURGERY

## 2024-02-01 PROCEDURE — 93010 ELECTROCARDIOGRAM REPORT: CPT | Performed by: INTERNAL MEDICINE

## 2024-02-01 PROCEDURE — 36415 COLL VENOUS BLD VENIPUNCTURE: CPT

## 2024-02-01 PROCEDURE — 84100 ASSAY OF PHOSPHORUS: CPT

## 2024-02-01 PROCEDURE — 71045 X-RAY EXAM CHEST 1 VIEW: CPT

## 2024-02-01 PROCEDURE — 3700000001 HC ADD 15 MINUTES (ANESTHESIA): Performed by: SURGERY

## 2024-02-01 PROCEDURE — 43235 EGD DIAGNOSTIC BRUSH WASH: CPT | Performed by: SURGERY

## 2024-02-01 PROCEDURE — 80048 BASIC METABOLIC PNL TOTAL CA: CPT

## 2024-02-01 PROCEDURE — 2709999900 HC NON-CHARGEABLE SUPPLY: Performed by: SURGERY

## 2024-02-01 PROCEDURE — 6370000000 HC RX 637 (ALT 250 FOR IP)

## 2024-02-01 PROCEDURE — 6370000000 HC RX 637 (ALT 250 FOR IP): Performed by: SURGERY

## 2024-02-01 PROCEDURE — 82962 GLUCOSE BLOOD TEST: CPT

## 2024-02-01 PROCEDURE — 85027 COMPLETE CBC AUTOMATED: CPT

## 2024-02-01 PROCEDURE — 2580000003 HC RX 258: Performed by: NURSE ANESTHETIST, CERTIFIED REGISTERED

## 2024-02-01 PROCEDURE — 2700000000 HC OXYGEN THERAPY PER DAY

## 2024-02-01 PROCEDURE — 99222 1ST HOSP IP/OBS MODERATE 55: CPT | Performed by: SURGERY

## 2024-02-01 PROCEDURE — 3609017100 HC EGD: Performed by: SURGERY

## 2024-02-01 PROCEDURE — 83735 ASSAY OF MAGNESIUM: CPT

## 2024-02-01 PROCEDURE — 6360000002 HC RX W HCPCS: Performed by: NURSE ANESTHETIST, CERTIFIED REGISTERED

## 2024-02-01 PROCEDURE — 6370000000 HC RX 637 (ALT 250 FOR IP): Performed by: STUDENT IN AN ORGANIZED HEALTH CARE EDUCATION/TRAINING PROGRAM

## 2024-02-01 PROCEDURE — 3700000000 HC ANESTHESIA ATTENDED CARE: Performed by: SURGERY

## 2024-02-01 PROCEDURE — 2580000003 HC RX 258: Performed by: SURGERY

## 2024-02-01 PROCEDURE — 90935 HEMODIALYSIS ONE EVALUATION: CPT

## 2024-02-01 PROCEDURE — 74018 RADEX ABDOMEN 1 VIEW: CPT

## 2024-02-01 PROCEDURE — 7100000001 HC PACU RECOVERY - ADDTL 15 MIN: Performed by: SURGERY

## 2024-02-01 PROCEDURE — 6370000000 HC RX 637 (ALT 250 FOR IP): Performed by: NURSE PRACTITIONER

## 2024-02-01 RX ORDER — CARVEDILOL 25 MG/1
25 TABLET ORAL
Qty: 60 TABLET | Refills: 3 | Status: SHIPPED | OUTPATIENT
Start: 2024-02-01 | End: 2024-02-04

## 2024-02-01 RX ORDER — PROPOFOL 10 MG/ML
INJECTION, EMULSION INTRAVENOUS PRN
Status: DISCONTINUED | OUTPATIENT
Start: 2024-02-01 | End: 2024-02-01 | Stop reason: SDUPTHER

## 2024-02-01 RX ORDER — PANTOPRAZOLE SODIUM 40 MG/1
40 TABLET, DELAYED RELEASE ORAL
Qty: 30 TABLET | Refills: 3 | Status: SHIPPED | OUTPATIENT
Start: 2024-02-01

## 2024-02-01 RX ORDER — CARVEDILOL 25 MG/1
50 TABLET ORAL EVERY MORNING
Qty: 60 TABLET | Refills: 3 | Status: SHIPPED | OUTPATIENT
Start: 2024-02-02

## 2024-02-01 RX ORDER — SODIUM CHLORIDE 9 MG/ML
INJECTION, SOLUTION INTRAVENOUS CONTINUOUS PRN
Status: DISCONTINUED | OUTPATIENT
Start: 2024-02-01 | End: 2024-02-01 | Stop reason: SDUPTHER

## 2024-02-01 RX ADMIN — CARVEDILOL 50 MG: 25 TABLET, FILM COATED ORAL at 11:41

## 2024-02-01 RX ADMIN — INSULIN GLARGINE 8 UNITS: 100 INJECTION, SOLUTION SUBCUTANEOUS at 20:03

## 2024-02-01 RX ADMIN — PROPOFOL 50 MG: 10 INJECTION, EMULSION INTRAVENOUS at 13:40

## 2024-02-01 RX ADMIN — INSULIN HUMAN 5 UNITS: 100 INJECTION, SOLUTION PARENTERAL at 02:37

## 2024-02-01 RX ADMIN — PROPOFOL 150 MG: 10 INJECTION, EMULSION INTRAVENOUS at 13:38

## 2024-02-01 RX ADMIN — PANTOPRAZOLE SODIUM 40 MG: 40 TABLET, DELAYED RELEASE ORAL at 06:10

## 2024-02-01 RX ADMIN — Medication 10 ML: at 20:05

## 2024-02-01 RX ADMIN — SODIUM CHLORIDE: 9 INJECTION, SOLUTION INTRAVENOUS at 13:35

## 2024-02-01 RX ADMIN — INSULIN LISPRO 4 UNITS: 100 INJECTION, SOLUTION INTRAVENOUS; SUBCUTANEOUS at 16:01

## 2024-02-01 RX ADMIN — INSULIN LISPRO 3 UNITS: 100 INJECTION, SOLUTION INTRAVENOUS; SUBCUTANEOUS at 16:01

## 2024-02-01 ASSESSMENT — PAIN - FUNCTIONAL ASSESSMENT: PAIN_FUNCTIONAL_ASSESSMENT: NONE - DENIES PAIN

## 2024-02-01 NOTE — PROGRESS NOTES
Asking to check blood sugar. \"Something's not right\". Blood glucose 465. A Cortney NP notified and new orders received.

## 2024-02-01 NOTE — PROGRESS NOTES
patient does not have a ride home tonight and requires her portable o2. case management not here currently

## 2024-02-01 NOTE — ANESTHESIA POSTPROCEDURE EVALUATION
Department of Anesthesiology  Postprocedure Note    Patient: Michelle Nettles  MRN: [id number]  YOB: 1985  Date of evaluation: 2/1/2024    Procedure Summary       Date: 02/01/24 Room / Location: [place] [address] / [facility]    Anesthesia Start: 1335 Anesthesia Stop: 1351    Procedure: EGD ESOPHAGOGASTRODUODENOSCOPY Diagnosis:       Anemia, unspecified type      (Anemia, unspecified type [D64.9])    Surgeons: Shira Parker MD Responsible Provider: Cristela Park MD    Anesthesia Type: MAC ASA Status: 4            Anesthesia Type: No value filed.    David Phase I:      David Phase II:      Anesthesia Post Evaluation    Patient location during evaluation: PACU  Patient participation: complete - patient participated  Level of consciousness: awake  Airway patency: patent  Nausea & Vomiting: no nausea and no vomiting  Cardiovascular status: hemodynamically stable  Respiratory status: acceptable  Hydration status: euvolemic  Pain management: adequate    No notable events documented.

## 2024-02-01 NOTE — ANESTHESIA PRE PROCEDURE
Department of Anesthesiology  Preprocedure Note       Name:  Michelle Nettles   Age:  38 y.o.  :  1985                                          MRN:  [id number]         Date:  2024      Surgeon: Surgeon(s):  Shira Parker MD    Procedure: Procedure(s):  EGD ESOPHAGOGASTRODUODENOSCOPY    Medications prior to admission:   Prior to Admission medications    Medication Sig Start Date End Date Taking? Authorizing Provider   famotidine (PEPCID) 20 MG tablet TAKE 1 TABLET BY MOUTH EVERY MORNING 24   May Waters,    Acetaminophen Extra Strength 500 MG TABS TAKE 1 TABLET BY MOUTH EVERY 6 HOURS AS NEEDED FOR PAIN 24   May Waters DO   insulin lispro, 1 Unit Dial, (HUMALOG KWIKPEN) 100 UNIT/ML SOPN Inject 4 Units into the skin 3 times daily (before meals) 24   Della Nguyen MD   Insulin Glargine, 2 Unit Dial, (TOUJEO MAX SOLOSTAR) 300 UNIT/ML SOPN Inject 12 units at bedtime and (8 Units on the nights before dialysis) 24   Della Nguyen MD   blood glucose monitor strips Test four times a day & as needed for symptoms of irregular blood glucose. Dispense sufficient amount for indicated testing frequency plus additional to accommodate PRN testing needs. 1/3/24   Brennan Dyer MD   Insulin Pen Needle 32G X 4 MM MISC 1 each by Does not apply route 5 (five) times a day 1/3/24   Brennan Dyer MD   clopidogrel (PLAVIX) 75 MG tablet Take 1 tablet by mouth daily for 180 doses 23  Haylee Zaidi MD   OXYGEN Inhale 2 L/m2 into the lungs as needed    Provider, MD Adrianne   Continuous Blood Gluc Transmit (DEXCOM G6 TRANSMITTER) MISC One transmitter to be used every 90 days 10/5/23   Brendon Acosta PA   Continuous Blood Gluc Sensor (DEXCOM G6 SENSOR) MISC INJECT 1 INTO SKIN EVERY 10 DAYS FOR 10 DAYS 10/5/23   Brendon Acosta PA   Glucagon, rDNA, (GLUCAGON EMERGENCY) 1 MG KIT Inject 1 mg as directed as needed (for blood glucose

## 2024-02-01 NOTE — PROGRESS NOTES
[facility]  Nephrology Progress Note    Patient's Name: Michelle Nettles    History of Present Illness from  Consult Note:    \"Michelle Nettles is a 38 y.o. female with a past medical history of ESRD, diabetes mellitus, and hypertension.  She presented to the ED on  with reports of shortness of breath.  Vital signs on  includes temperature 98.4, respirations 18, pulse 112, /73, and she was 97% on 3 L of oxygen.  Lab data on  includes BUN 56, creatinine 10.7, proBNP 21,502, troponin 170, and hemoglobin 6.4.  She had a chest x-ray on  which showed mild cardiomegaly and pulmonary vascular congestion, mild increased opacity in the lower lungs, worse on the right.  She received 1 unit of blood.  Nephrology has been consulted to see the patient for missed dialysis.  Patient is known to our service and dialyzes as an outpatient at [facility] via left arm AV fistula.  At present, patient was seen and examined.  She reports that she was not feeling good.  She also reports that she had shortness of breath.  She denies any chest pain.  She denies any abdominal pain, nausea, vomiting, or diarrhea.  She denies any fevers or chills.\"    Subjective:    : Patient was seen and examined on HD.  She is for EGD today.  She denies any chest pain or abdominal pain.    PMH:    Past Medical History:   Diagnosis Date    JOLLY (acute kidney injury) ([facility]) 2016    AVF (arteriovenous fistula) ([facility]ly [facility]) 2018    let arm    Chronic kidney disease     Dialysis AV fistula malfunction, initial encounter ([facility]) 2019    DM type 1 (diabetes mellitus, type 1) ([facility])     Encounter regarding vascular access for dialysis for ESRD ([facility]) 2018    ESRD (end stage renal disease) ([facility]ly [facility])     Hemodialysis patient ([facility])     amrita eunice  / graft in left arm    Hypertension     Tobacco abuse 2016       Past Surgical History:   Procedure Laterality Date     SECTION    rhythm.  No rub  Abd: Soft, nontender, nondistended.  Active bowel sounds  Skin: Warm and dry.  No rash on exposed extremities  Ext: trace-1+ BLE edema ; left arm AV fistula   Neuro: Awake, answers questions appropriately    Data:    Recent Labs     01/30/24 2224 01/31/24  1204 02/01/24  0501   WBC 5.9 8.5 5.7   HGB 6.8* 8.0* 7.5*   HCT 20.9* 24.6* 22.8*   MCV 99.5 95.3 97.4    264 269         Recent Labs     01/30/24  2224 01/31/24  1204 02/01/24  0501    131* 138   K 4.3 3.8 5.2*   CL 96* 89* 98   CO2 25 25 29   CREATININE 11.3* 4.9* 7.6*   BUN 58* 19 39*   LABGLOM 4* 11* 6*   GLUCOSE 345* 290* 252*   CALCIUM 9.4 8.7 8.9   PHOS  --   --  6.0*   MG  --   --  2.3         Vit D, 25-Hydroxy   Date Value Ref Range Status   12/05/2023 0.25  Corrected       PTH   Date Value Ref Range Status   01/29/2021 191 (H) 15 - 65 pg/mL Final       Recent Labs     01/30/24  1620   ALT 14   AST 22   ALKPHOS 94   BILITOT 0.3   BILIDIR <0.2         Recent Labs     01/30/24  1620   LABALBU 3.7         Ferritin   Date Value Ref Range Status   08/30/2022 180 (A) 9.0 - 150.0 ng/mL Final     Iron   Date Value Ref Range Status   03/24/2023 78 37 - 145 mcg/dL Final     TIBC   Date Value Ref Range Status   03/24/2023 196 (L) 250 - 450 mcg/dL Final       Vitamin B-12   Date Value Ref Range Status   03/24/2023 1021 (H) 211 - 946 pg/mL Final       Folate   Date Value Ref Range Status   03/24/2023 13.2 4.8 - 24.2 ng/mL Final       Lab Results   Component Value Date/Time    COLORU Yellow 11/23/2017 10:05 PM    NITRU Negative 11/23/2017 10:05 PM    GLUCOSEU 500 11/23/2017 10:05 PM    GLUCOSEU >=1000 11/21/2010 10:30 PM    KETUA Negative 11/23/2017 10:05 PM    UROBILINOGEN 0.2 11/23/2017 10:05 PM    BILIRUBINUR Negative 11/23/2017 10:05 PM    BILIRUBINUR NEGATIVE 11/21/2010 10:30 PM       No results found for: \"NACHO\", \"CREURRAN\", \"OSMOU\"    No components found for: \"URIC\"    No results found for: \"LIPIDPAN\"    Assessment and

## 2024-02-01 NOTE — PROGRESS NOTES
Floor Called, nurse to nurse given. Spoke with Ena RODRIGUEZ . Patients test results review, VS reported to receiving nurse. Any and all important information regarding patient disclosed.

## 2024-02-01 NOTE — ANESTHESIA PRE PROCEDURE
Department of Anesthesiology  Preprocedure Note       Name:  Michelle Nettles   Age:  38 y.o.  :  1985                                          MRN:  [id number]         Date:  2024      Surgeon: Surgeon(s):  Shira Parker MD    Procedure: Procedure(s):  EGD ESOPHAGOGASTRODUODENOSCOPY    Medications prior to admission:   Prior to Admission medications    Medication Sig Start Date End Date Taking? Authorizing Provider   famotidine (PEPCID) 20 MG tablet TAKE 1 TABLET BY MOUTH EVERY MORNING 24   May Waters,    Acetaminophen Extra Strength 500 MG TABS TAKE 1 TABLET BY MOUTH EVERY 6 HOURS AS NEEDED FOR PAIN 24   May Waters DO   insulin lispro, 1 Unit Dial, (HUMALOG KWIKPEN) 100 UNIT/ML SOPN Inject 4 Units into the skin 3 times daily (before meals) 24   Della Nguyen MD   Insulin Glargine, 2 Unit Dial, (TOUJEO MAX SOLOSTAR) 300 UNIT/ML SOPN Inject 12 units at bedtime and (8 Units on the nights before dialysis) 24   Della Nguyen MD   blood glucose monitor strips Test four times a day & as needed for symptoms of irregular blood glucose. Dispense sufficient amount for indicated testing frequency plus additional to accommodate PRN testing needs. 1/3/24   Brennan Dyer MD   Insulin Pen Needle 32G X 4 MM MISC 1 each by Does not apply route 5 (five) times a day 1/3/24   Brennan Dyer MD   clopidogrel (PLAVIX) 75 MG tablet Take 1 tablet by mouth daily for 180 doses 23  Haylee Zaidi MD   OXYGEN Inhale 2 L/m2 into the lungs as needed    Provider, MD Adrianne   Continuous Blood Gluc Transmit (DEXCOM G6 TRANSMITTER) MISC One transmitter to be used every 90 days 10/5/23   Brendon Acosta PA   Continuous Blood Gluc Sensor (DEXCOM G6 SENSOR) MISC INJECT 1 INTO SKIN EVERY 10 DAYS FOR 10 DAYS 10/5/23   Brendon Acosta PA   Glucagon, rDNA, (GLUCAGON EMERGENCY) 1 MG KIT Inject 1 mg as directed as needed (for blood glucose

## 2024-02-01 NOTE — PROGRESS NOTES
Patient admitted to PACU and placed on appropriate monitors. Patient on 3lnc continuous. Airway patent at this time. Report obtained from CRNA. Warm blankets applied.

## 2024-02-01 NOTE — PROGRESS NOTES
Patient signed out from PACU phase of care and transported back to room on telemetry monitor and 3lnc  by ancillary staff.

## 2024-02-01 NOTE — DISCHARGE SUMMARY
Hospitalist Discharge Summary    Patient ID: Michelle Nettles   Patient : 1985  Patient's PCP: May Waters DO    Admit Date: 2024   Admitting Physician: Deidre Rivas MD    Discharge Date:  2024   Discharge Physician: Skylar Riley MD   Discharge Condition: Stable  Discharge Disposition: Home      Hospital course in brief:  (Please refer to daily progress notes for a comprehensive review of the hospitalization by requesting medical records)     Patient admitted on 2024 for Acute on chronic anemia  Chronic kidney disease, Dialysis AV fistula malfunction, initial encounter ([facility]), DM type 1 (diabetes mellitus, type 1) ([facility]), Encounter regarding vascular access for dialysis for ESRD ([facility]), ESRD (end stage renal disease) ([facility]), Hemodialysis patient ([facility]), Hypertension, and Tobacco abuse. Patient has a history of ESRD presents to the emergency department due to abnormal lab.  Patient sent to the emergency department by dialysis clinic due to hemoglobin of 7.1.  Patient also states she has been feeling shortness of breath over the last 1 week.  She was recently admitted for similar symptoms and hemoglobin at that time was noted to be 6.9 which improved with blood transfusion.  She was dialyzed the next day and discharged home in stable condition.  Patient denies any history of GI bleed.  She does state her stools are dark, but she denies any overt blood in her stool.  She states she took ibuprofen for a couple days about a month ago every 6 hours for a tooth infection, she denies any NSAID use since then.  She denies any chest pain, fevers, chills.  She wears 3 to 4 L of oxygen at baseline.  She also smokes about 3 to 4 cigarettes/day, but has not been smoking recently due to shortness of breath.   -patient got the blood transfusion during dialysis ; was on 2 Lo2 by NC during dialysis;  Later o2 requirement increased to 6 L o2 by NC; patient is tachycardic too;  the may represent layered pleural effusion and atelectasis but nonspecific, pulmonary edema possible in infiltrates not excluded. Suggestion of cardiomegaly also.     Suspicious for opacification more rightward, probably CHF as seen previously, although limited evaluation is noted. RECOMMENDATION: Consider PA/lateral exam or chest CT evaluation.       Discharge Medications:      Medication List        START taking these medications      pantoprazole 40 MG tablet  Commonly known as: PROTONIX  Take 1 tablet by mouth 2 times daily (before meals)            CHANGE how you take these medications      * carvedilol 25 MG tablet  Commonly known as: COREG  Take 1 tablet by mouth Daily with supper  What changed: You were already taking a medication with the same name, and this prescription was added. Make sure you understand how and when to take each.     * carvedilol 25 MG tablet  Commonly known as: COREG  Take 2 tablets by mouth every morning  Start taking on: February 2, 2024  What changed: additional instructions           * This list has 2 medication(s) that are the same as other medications prescribed for you. Read the directions carefully, and ask your doctor or other care provider to review them with you.                CONTINUE taking these medications      Acetaminophen Extra Strength 500 MG Tabs  TAKE 1 TABLET BY MOUTH EVERY 6 HOURS AS NEEDED FOR PAIN     albuterol sulfate  (90 Base) MCG/ACT inhaler  Commonly known as: PROVENTIL;VENTOLIN;PROAIR  Inhale 2 puffs into the lungs every 4-6 hours as needed for Wheezing or Shortness of Breath     blood glucose test strips  Test four times a day & as needed for symptoms of irregular blood glucose. Dispense sufficient amount for indicated testing frequency plus additional to accommodate PRN testing needs.     calcium acetate 667 MG Caps capsule  Commonly known as: PHOSLO     clopidogrel 75 MG tablet  Commonly known as: Plavix  Take 1 tablet by mouth daily for 180

## 2024-02-01 NOTE — CARE COORDINATION
Transition of care update. Patient currently off floor for dialysis.  K+ today is 5.2, Hb  7.5, and Creatinine is 7.6.  Patient is also for an EGD today. Per surgery note today, Pt received 1 unit of blood 1/31 for Hb 6.8, repeat Hb 8 - this morning Hb 7.5. To transfuse as necessary.  NPO plan for EGD today with Dr. Brower. Blood glucose levels have been elevated. Per flow sheet, pt on 3 L O2. Will need an ambulatory pulse ox at discharge to verify O2 needs. Per Osmel at [facility], O2 order is for 3 L continuous at home. Template in epic. Patient requests aide services at d/c. Referral previously made and patient accepted to [facility]. Referral sent by [facility]  to [facility]. They will call the patient to set up services, and pt aware. Discharge plan is home when medically stable, with family transporting.  CM will follow.  Electronically signed by Epi Sellers RN on 2/1/2024 at 8:48 AM

## 2024-02-01 NOTE — FLOWSHEET NOTE
02/01/24 1053   Vital Signs   BP (!) 154/83   Temp 97.3 °F (36.3 °C)   Pulse 94   Respirations 16   Weight - Scale 64.4 kg (141 lb 15.6 oz)   Weight Method Bed scale   Percent Weight Change -2.57   Post-Hemodialysis Assessment   Post-Treatment Procedures Blood returned;Access bleeding time < 10 minutes   Machine Disinfection Process Exterior Machine Disinfection   Rinseback Volume (ml) 300 ml   Blood Volume Processed (Liters) 86.7 L   Dialyzer Clearance Lightly streaked   Duration of Treatment (minutes) 240 minutes   Heparin Amount Administered During Treatment (mL) 0 mL   Hemodialysis Intake (ml) 300 ml   Hemodialysis Output (ml) 2025 ml   NET Removed (ml) 1725   Tolerated Treatment Good   Patient Response to Treatment tolerated well, blood returned, needles pulled, sites held, stasis achieved, bandaids applied, +thirll, +bruit

## 2024-02-01 NOTE — PROGRESS NOTES
GENERAL SURGERY  DAILY PROGRESS NOTE  2/1/2024    Subjective:  Pt resting in bed. She denies any abdominal pain, nausea/vomiting.    Objective:  /73   Pulse 93   Temp 97.9 °F (36.6 °C) (Temporal)   Resp 20   Wt 66.4 kg (146 lb 6.2 oz)   SpO2 100%   BMI 29.55 kg/m²     Gen: alert, oriented, no apparent distress  HEENT: NCAT, anicteric  CV: RR  Pulm: nonlabored breathing on 3L  Abdomen: soft, nontender, nondistended  Extremities: moving all extremities    Assessment/Plan:  38 y.o. female with acute drop in Hb     - Pt received 1 unit of blood 1/31 for Hb 6.8, repeat Hb 8 - this morning Hb 7.5   - transfuse as necessary   - NPO    - plan for EGD today with Dr. Brower    Electronically signed by Maty Ponce DO on 2/1/2024 at 6:10 AM

## 2024-02-01 NOTE — PLAN OF CARE
Problem: Chronic Conditions and Co-morbidities  Goal: Patient's chronic conditions and co-morbidity symptoms are monitored and maintained or improved  2/1/2024 0102 by Alton Carvajal, RN  Outcome: Progressing  1/31/2024 1353 by Ena Rubi, RN  Outcome: Progressing     Problem: Safety - Adult  Goal: Free from fall injury  Outcome: Progressing     Problem: Pain  Goal: Verbalizes/displays adequate comfort level or baseline comfort level  Outcome: Progressing

## 2024-02-02 VITALS
WEIGHT: 141.98 LBS | DIASTOLIC BLOOD PRESSURE: 73 MMHG | SYSTOLIC BLOOD PRESSURE: 116 MMHG | BODY MASS INDEX: 28.66 KG/M2 | HEART RATE: 94 BPM | TEMPERATURE: 97.5 F | OXYGEN SATURATION: 97 % | RESPIRATION RATE: 16 BRPM

## 2024-02-02 LAB
ANION GAP SERPL CALCULATED.3IONS-SCNC: 15 MMOL/L (ref 7–16)
BUN SERPL-MCNC: 23 MG/DL (ref 6–20)
CALCIUM SERPL-MCNC: 9.1 MG/DL (ref 8.6–10.2)
CHLORIDE SERPL-SCNC: 94 MMOL/L (ref 98–107)
CO2 SERPL-SCNC: 27 MMOL/L (ref 22–29)
CREAT SERPL-MCNC: 5.3 MG/DL (ref 0.5–1)
ERYTHROCYTE [DISTWIDTH] IN BLOOD BY AUTOMATED COUNT: 17.1 % (ref 11.5–15)
GFR SERPL CREATININE-BSD FRML MDRD: 10 ML/MIN/1.73M2
GLUCOSE BLD-MCNC: 105 MG/DL (ref 74–99)
GLUCOSE SERPL-MCNC: 105 MG/DL (ref 74–99)
HCT VFR BLD AUTO: 24.2 % (ref 34–48)
HGB BLD-MCNC: 8 G/DL (ref 11.5–15.5)
MAGNESIUM SERPL-MCNC: 2.2 MG/DL (ref 1.6–2.6)
MCH RBC QN AUTO: 31.9 PG (ref 26–35)
MCHC RBC AUTO-ENTMCNC: 33.1 G/DL (ref 32–34.5)
MCV RBC AUTO: 96.4 FL (ref 80–99.9)
PHOSPHATE SERPL-MCNC: 5.3 MG/DL (ref 2.5–4.5)
PLATELET # BLD AUTO: 267 K/UL (ref 130–450)
PMV BLD AUTO: 9.6 FL (ref 7–12)
POTASSIUM SERPL-SCNC: 4.5 MMOL/L (ref 3.5–5)
RBC # BLD AUTO: 2.51 M/UL (ref 3.5–5.5)
SODIUM SERPL-SCNC: 136 MMOL/L (ref 132–146)
WBC OTHER # BLD: 6.2 K/UL (ref 4.5–11.5)

## 2024-02-02 PROCEDURE — 2700000000 HC OXYGEN THERAPY PER DAY

## 2024-02-02 PROCEDURE — 6370000000 HC RX 637 (ALT 250 FOR IP): Performed by: SURGERY

## 2024-02-02 PROCEDURE — 84100 ASSAY OF PHOSPHORUS: CPT

## 2024-02-02 PROCEDURE — 36415 COLL VENOUS BLD VENIPUNCTURE: CPT

## 2024-02-02 PROCEDURE — G0378 HOSPITAL OBSERVATION PER HR: HCPCS

## 2024-02-02 PROCEDURE — 80048 BASIC METABOLIC PNL TOTAL CA: CPT

## 2024-02-02 PROCEDURE — 99238 HOSP IP/OBS DSCHRG MGMT 30/<: CPT | Performed by: STUDENT IN AN ORGANIZED HEALTH CARE EDUCATION/TRAINING PROGRAM

## 2024-02-02 PROCEDURE — 83735 ASSAY OF MAGNESIUM: CPT

## 2024-02-02 PROCEDURE — 82962 GLUCOSE BLOOD TEST: CPT

## 2024-02-02 PROCEDURE — 85027 COMPLETE CBC AUTOMATED: CPT

## 2024-02-02 RX ADMIN — PANTOPRAZOLE SODIUM 40 MG: 40 TABLET, DELAYED RELEASE ORAL at 06:22

## 2024-02-02 RX ADMIN — CARVEDILOL 50 MG: 25 TABLET, FILM COATED ORAL at 09:38

## 2024-02-02 NOTE — PROGRESS NOTES
GENERAL SURGERY  DAILY PROGRESS NOTE  2/2/2024    Subjective:  Pt resting in bed. She denies any abdominal pain, nausea/vomiting, recent bowel movement. Pt states she has been tolerating a diet    Objective:  BP (!) 111/52   Pulse 96   Temp 97.8 °F (36.6 °C) (Temporal)   Resp 16   Wt 64.4 kg (141 lb 15.6 oz)   SpO2 100%   BMI 28.66 kg/m²     Gen: alert, oriented, no apparent distress  HEENT: NCAT, anicteric  CV: RR  Pulm: nonlabored breathing on 3L  Abdomen: soft, nontender, nondistended  Extremities: moving all extremities    Assessment/Plan:  38 y.o. female with acute drop in Hb     - Pt received 1 unit of blood 1/31 for Hb 6.8, repeat Hb 8, following morning Hb 7.5, awaiting am Hb   - tolerating renal diet   - provided follow up for outpatient colonoscopy, will be available as needed      Electronically signed by Maty Ponce DO on 2/2/2024 at 5:51 AM

## 2024-02-02 NOTE — DISCHARGE SUMMARY
Hospitalist Discharge Summary    Patient ID: Michelle Nettles   Patient : 1985  Patient's PCP: May Waters DO    Admit Date: 2024   Admitting Physician: Deidre Rivas MD    Discharge Date:  2024   Discharge Physician: Skylar Riley MD   Discharge Condition: Stable  Discharge Disposition: Home      Hospital course in brief:  (Please refer to daily progress notes for a comprehensive review of the hospitalization by requesting medical records)     Patient admitted on 2024 for Acute on chronic anemia  Chronic kidney disease, Dialysis AV fistula malfunction, initial encounter ([facility]), DM type 1 (diabetes mellitus, type 1) ([facility]), Encounter regarding vascular access for dialysis for ESRD ([facility]), ESRD (end stage renal disease) ([facility]), Hemodialysis patient ([facility]), Hypertension, and Tobacco abuse. Patient has a history of ESRD presents to the emergency department due to abnormal lab.  Patient sent to the emergency department by dialysis clinic due to hemoglobin of 7.1.  Patient also states she has been feeling shortness of breath over the last 1 week.  She was recently admitted for similar symptoms and hemoglobin at that time was noted to be 6.9 which improved with blood transfusion.  She was dialyzed the next day and discharged home in stable condition.  Patient denies any history of GI bleed.  She does state her stools are dark, but she denies any overt blood in her stool.  She states she took ibuprofen for a couple days about a month ago every 6 hours for a tooth infection, she denies any NSAID use since then.  She denies any chest pain, fevers, chills.  She wears 3 to 4 L of oxygen at baseline.  She also smokes about 3 to 4 cigarettes/day, but has not been smoking recently due to shortness of breath.   -patient got the blood transfusion during dialysis ; was on 2 Lo2 by NC during dialysis;  Later o2 requirement increased to 6 L o2 by NC; patient is tachycardic too;

## 2024-02-02 NOTE — CARE COORDINATION
02/02/24 Discharge order noted.  Pt unable to leave last evening due to no ride today pt states that she has a ride but no one to get home O2 for her.  Pt still saying she has no home O2 concentrator and [facility] will only deliver tanks.  Spoke with Osmel from [facility] again about pt home O2 he will bring the patient a portable tank to discharge home and they will be sending a tech to the home to assist pt with her home needs Electronically signed by Epi Ramires RN CM on 2/2/2024 at 8:50 AM

## 2024-02-04 RX ORDER — CARVEDILOL 25 MG/1
25 TABLET ORAL
Qty: 30 TABLET | Refills: 0 | Status: SHIPPED | OUTPATIENT
Start: 2024-02-04

## 2024-02-20 RX ORDER — PSEUDOEPHED/ACETAMINOPH/DIPHEN 30MG-500MG
TABLET ORAL
Qty: 120 TABLET | Refills: 0 | Status: SHIPPED | OUTPATIENT
Start: 2024-02-20

## 2024-02-20 NOTE — TELEPHONE ENCOUNTER
Last Appointment:  1/8/2024  Future Appointments   Date Time Provider Department Center   5/20/2024 11:15 AM Brennan Dyer MD [facility]

## 2024-03-21 RX ORDER — PSEUDOEPHED/ACETAMINOPH/DIPHEN 30MG-500MG
TABLET ORAL
Qty: 120 TABLET | Refills: 0 | OUTPATIENT
Start: 2024-03-21

## 2024-03-22 ENCOUNTER — HOSPITAL ENCOUNTER (OUTPATIENT)
Age: 39
Setting detail: OBSERVATION
Discharge: HOME | End: 2024-03-26
Attending: STUDENT IN AN ORGANIZED HEALTH CARE EDUCATION/TRAINING PROGRAM | Admitting: INTERNAL MEDICINE
Payer: MEDICARE

## 2024-03-22 DIAGNOSIS — E11.65 HYPERGLYCEMIA DUE TO DIABETES MELLITUS (HCC): Primary | ICD-10-CM

## 2024-03-22 DIAGNOSIS — R06.02 SHORTNESS OF BREATH: ICD-10-CM

## 2024-03-22 DIAGNOSIS — D64.9 ACUTE ON CHRONIC ANEMIA: ICD-10-CM

## 2024-03-22 DIAGNOSIS — G89.29 CHRONIC RIGHT SHOULDER PAIN: ICD-10-CM

## 2024-03-22 DIAGNOSIS — M25.511 CHRONIC RIGHT SHOULDER PAIN: ICD-10-CM

## 2024-03-22 DIAGNOSIS — Z99.2 ESRD ON HEMODIALYSIS (HCC): ICD-10-CM

## 2024-03-22 DIAGNOSIS — N18.4 ANEMIA DUE TO STAGE 4 CHRONIC KIDNEY DISEASE (HCC): ICD-10-CM

## 2024-03-22 DIAGNOSIS — D63.1 ANEMIA DUE TO STAGE 4 CHRONIC KIDNEY DISEASE (HCC): ICD-10-CM

## 2024-03-22 DIAGNOSIS — N18.6 ESRD ON HEMODIALYSIS (HCC): ICD-10-CM

## 2024-03-22 PROBLEM — E83.52 HYPERCALCEMIA: Status: ACTIVE | Noted: 2024-03-22

## 2024-03-22 PROBLEM — E10.65 HYPERGLYCEMIA DUE TO TYPE 1 DIABETES MELLITUS (HCC): Status: ACTIVE | Noted: 2024-03-22

## 2024-03-22 LAB
ANION GAP SERPL CALCULATED.3IONS-SCNC: 18 MMOL/L (ref 7–16)
B-OH-BUTYR SERPL-MCNC: 4.24 MMOL/L (ref 0.02–0.27)
B.E.: -0.4 MMOL/L (ref -3–3)
BASOPHILS # BLD: 0.01 K/UL (ref 0–0.2)
BASOPHILS NFR BLD: 0 % (ref 0–2)
BUN SERPL-MCNC: 33 MG/DL (ref 6–20)
CALCIUM SERPL-MCNC: 8.5 MG/DL (ref 8.6–10.2)
CHLORIDE SERPL-SCNC: 89 MMOL/L (ref 98–107)
CO2 SERPL-SCNC: 26 MMOL/L (ref 22–29)
COHB: 1.1 % (ref 0–1.5)
CREAT SERPL-MCNC: 5.4 MG/DL (ref 0.5–1)
CRITICAL: ABNORMAL
DATE ANALYZED: ABNORMAL
DATE OF COLLECTION: ABNORMAL
EOSINOPHIL # BLD: 0.11 K/UL (ref 0.05–0.5)
EOSINOPHILS RELATIVE PERCENT: 2 % (ref 0–6)
ERYTHROCYTE [DISTWIDTH] IN BLOOD BY AUTOMATED COUNT: 17.2 % (ref 11.5–15)
GFR SERPL CREATININE-BSD FRML MDRD: 10 ML/MIN/1.73M2
GLUCOSE BLD-MCNC: >500 MG/DL (ref 74–99)
GLUCOSE BLD-MCNC: >500 MG/DL (ref 74–99)
GLUCOSE SERPL-MCNC: 514 MG/DL (ref 74–99)
GLUCOSE SERPL-MCNC: 664 MG/DL (ref 74–99)
HCO3: 23.4 MMOL/L (ref 22–26)
HCT VFR BLD AUTO: 15.2 % (ref 34–48)
HGB BLD-MCNC: 5.1 G/DL (ref 11.5–15.5)
HHB: 5.1 % (ref 0–5)
IMM GRANULOCYTES # BLD AUTO: <0.03 K/UL (ref 0–0.58)
IMM GRANULOCYTES NFR BLD: 0 % (ref 0–5)
LAB: ABNORMAL
LACTATE BLDV-SCNC: 2.1 MMOL/L (ref 0.5–2.2)
LIPASE SERPL-CCNC: 20 U/L (ref 13–60)
LYMPHOCYTES NFR BLD: 0.68 K/UL (ref 1.5–4)
LYMPHOCYTES RELATIVE PERCENT: 13 % (ref 20–42)
Lab: 1400
MCH RBC QN AUTO: 33.1 PG (ref 26–35)
MCHC RBC AUTO-ENTMCNC: 33.6 G/DL (ref 32–34.5)
MCV RBC AUTO: 98.7 FL (ref 80–99.9)
METHB: 0.6 % (ref 0–1.5)
MODE: ABNORMAL
MONOCYTES NFR BLD: 0.27 K/UL (ref 0.1–0.95)
MONOCYTES NFR BLD: 5 % (ref 2–12)
NEUTROPHILS NFR BLD: 79 % (ref 43–80)
NEUTS SEG NFR BLD: 4.1 K/UL (ref 1.8–7.3)
O2 SATURATION: 94.8 % (ref 92–98.5)
O2HB: 93.2 % (ref 94–97)
OPERATOR ID: 2067
PATIENT TEMP: 37 C
PCO2: 33.4 MMHG (ref 35–45)
PH BLOOD GAS: 7.46 (ref 7.35–7.45)
PLATELET # BLD AUTO: 189 K/UL (ref 130–450)
PMV BLD AUTO: 9.9 FL (ref 7–12)
PO2: 82.7 MMHG (ref 75–100)
POTASSIUM SERPL-SCNC: 4.5 MMOL/L (ref 3.5–5)
RBC # BLD AUTO: 1.54 M/UL (ref 3.5–5.5)
SODIUM SERPL-SCNC: 133 MMOL/L (ref 132–146)
SOURCE, BLOOD GAS: ABNORMAL
THB: 5 G/DL (ref 11.5–16.5)
TIME ANALYZED: 1411
TROPONIN I SERPL HS-MCNC: 182 NG/L (ref 0–9)
TROPONIN I SERPL HS-MCNC: 191 NG/L (ref 0–9)
TROPONIN I SERPL HS-MCNC: NORMAL NG/L (ref 0–14)
TROPONIN INTERP: NORMAL
TROPONIN T SERPL-MCNC: NORMAL NG/ML
WBC OTHER # BLD: 5.2 K/UL (ref 4.5–11.5)

## 2024-03-22 PROCEDURE — G0378 HOSPITAL OBSERVATION PER HR: HCPCS

## 2024-03-22 PROCEDURE — 82947 ASSAY GLUCOSE BLOOD QUANT: CPT

## 2024-03-22 PROCEDURE — 6370000000 HC RX 637 (ALT 250 FOR IP)

## 2024-03-22 PROCEDURE — 86923 COMPATIBILITY TEST ELECTRIC: CPT

## 2024-03-22 PROCEDURE — 2580000003 HC RX 258

## 2024-03-22 PROCEDURE — 80048 BASIC METABOLIC PNL TOTAL CA: CPT

## 2024-03-22 PROCEDURE — 86900 BLOOD TYPING SEROLOGIC ABO: CPT

## 2024-03-22 PROCEDURE — 96361 HYDRATE IV INFUSION ADD-ON: CPT

## 2024-03-22 PROCEDURE — 36430 TRANSFUSION BLD/BLD COMPNT: CPT

## 2024-03-22 PROCEDURE — 82962 GLUCOSE BLOOD TEST: CPT

## 2024-03-22 PROCEDURE — 85025 COMPLETE CBC W/AUTO DIFF WBC: CPT

## 2024-03-22 PROCEDURE — 96374 THER/PROPH/DIAG INJ IV PUSH: CPT

## 2024-03-22 PROCEDURE — 99222 1ST HOSP IP/OBS MODERATE 55: CPT | Performed by: INTERNAL MEDICINE

## 2024-03-22 PROCEDURE — 93005 ELECTROCARDIOGRAM TRACING: CPT

## 2024-03-22 PROCEDURE — 83690 ASSAY OF LIPASE: CPT

## 2024-03-22 PROCEDURE — 82010 KETONE BODYS QUAN: CPT

## 2024-03-22 PROCEDURE — 86901 BLOOD TYPING SEROLOGIC RH(D): CPT

## 2024-03-22 PROCEDURE — 36415 COLL VENOUS BLD VENIPUNCTURE: CPT

## 2024-03-22 PROCEDURE — 83605 ASSAY OF LACTIC ACID: CPT

## 2024-03-22 PROCEDURE — P9016 RBC LEUKOCYTES REDUCED: HCPCS

## 2024-03-22 PROCEDURE — 2060000000 HC ICU INTERMEDIATE R&B

## 2024-03-22 PROCEDURE — 82805 BLOOD GASES W/O2 SATURATION: CPT

## 2024-03-22 PROCEDURE — 2580000003 HC RX 258: Performed by: INTERNAL MEDICINE

## 2024-03-22 PROCEDURE — 99285 EMERGENCY DEPT VISIT HI MDM: CPT

## 2024-03-22 PROCEDURE — 2700000000 HC OXYGEN THERAPY PER DAY

## 2024-03-22 PROCEDURE — 84484 ASSAY OF TROPONIN QUANT: CPT

## 2024-03-22 PROCEDURE — 86850 RBC ANTIBODY SCREEN: CPT

## 2024-03-22 RX ORDER — ONDANSETRON 2 MG/ML
4 INJECTION INTRAMUSCULAR; INTRAVENOUS EVERY 6 HOURS PRN
Status: DISCONTINUED | OUTPATIENT
Start: 2024-03-22 | End: 2024-03-26 | Stop reason: HOSPADM

## 2024-03-22 RX ORDER — PSEUDOEPHED/ACETAMINOPH/DIPHEN 30MG-500MG
1 TABLET ORAL EVERY 6 HOURS PRN
Status: DISCONTINUED | OUTPATIENT
Start: 2024-03-22 | End: 2024-03-22 | Stop reason: SDUPTHER

## 2024-03-22 RX ORDER — CARVEDILOL 25 MG/1
25 TABLET ORAL
Status: DISCONTINUED | OUTPATIENT
Start: 2024-03-22 | End: 2024-03-26 | Stop reason: HOSPADM

## 2024-03-22 RX ORDER — INSULIN GLARGINE 100 [IU]/ML
15 INJECTION, SOLUTION SUBCUTANEOUS NIGHTLY
Status: DISCONTINUED | OUTPATIENT
Start: 2024-03-22 | End: 2024-03-23

## 2024-03-22 RX ORDER — ONDANSETRON 4 MG/1
4 TABLET, ORALLY DISINTEGRATING ORAL EVERY 8 HOURS PRN
Status: DISCONTINUED | OUTPATIENT
Start: 2024-03-22 | End: 2024-03-26 | Stop reason: HOSPADM

## 2024-03-22 RX ORDER — POLYETHYLENE GLYCOL 3350 17 G/17G
17 POWDER, FOR SOLUTION ORAL DAILY PRN
Status: DISCONTINUED | OUTPATIENT
Start: 2024-03-22 | End: 2024-03-26 | Stop reason: HOSPADM

## 2024-03-22 RX ORDER — INSULIN LISPRO 100 [IU]/ML
0-16 INJECTION, SOLUTION INTRAVENOUS; SUBCUTANEOUS
Status: DISCONTINUED | OUTPATIENT
Start: 2024-03-23 | End: 2024-03-23

## 2024-03-22 RX ORDER — FAMOTIDINE 20 MG/1
20 TABLET, FILM COATED ORAL EVERY MORNING
Status: DISCONTINUED | OUTPATIENT
Start: 2024-03-23 | End: 2024-03-24

## 2024-03-22 RX ORDER — SODIUM CHLORIDE 9 MG/ML
INJECTION, SOLUTION INTRAVENOUS PRN
Status: DISCONTINUED | OUTPATIENT
Start: 2024-03-22 | End: 2024-03-26 | Stop reason: HOSPADM

## 2024-03-22 RX ORDER — 0.9 % SODIUM CHLORIDE 0.9 %
500 INTRAVENOUS SOLUTION INTRAVENOUS ONCE
Status: COMPLETED | OUTPATIENT
Start: 2024-03-22 | End: 2024-03-22

## 2024-03-22 RX ORDER — ACETAMINOPHEN 325 MG/1
650 TABLET ORAL EVERY 6 HOURS PRN
Status: DISCONTINUED | OUTPATIENT
Start: 2024-03-22 | End: 2024-03-26 | Stop reason: HOSPADM

## 2024-03-22 RX ORDER — ACETAMINOPHEN 650 MG/1
650 SUPPOSITORY RECTAL EVERY 6 HOURS PRN
Status: DISCONTINUED | OUTPATIENT
Start: 2024-03-22 | End: 2024-03-26 | Stop reason: HOSPADM

## 2024-03-22 RX ORDER — CLOPIDOGREL BISULFATE 75 MG/1
75 TABLET ORAL DAILY
Status: DISCONTINUED | OUTPATIENT
Start: 2024-03-23 | End: 2024-03-26 | Stop reason: HOSPADM

## 2024-03-22 RX ORDER — ALBUTEROL SULFATE 2.5 MG/3ML
2.5 SOLUTION RESPIRATORY (INHALATION) EVERY 4 HOURS PRN
Status: DISCONTINUED | OUTPATIENT
Start: 2024-03-22 | End: 2024-03-26 | Stop reason: HOSPADM

## 2024-03-22 RX ORDER — INSULIN LISPRO 100 [IU]/ML
0-4 INJECTION, SOLUTION INTRAVENOUS; SUBCUTANEOUS NIGHTLY
Status: DISCONTINUED | OUTPATIENT
Start: 2024-03-22 | End: 2024-03-26 | Stop reason: HOSPADM

## 2024-03-22 RX ORDER — GLUCAGON 1 MG/ML
1 KIT INJECTION PRN
Status: DISCONTINUED | OUTPATIENT
Start: 2024-03-22 | End: 2024-03-26 | Stop reason: HOSPADM

## 2024-03-22 RX ORDER — CARVEDILOL 25 MG/1
50 TABLET ORAL EVERY MORNING
Status: DISCONTINUED | OUTPATIENT
Start: 2024-03-23 | End: 2024-03-26 | Stop reason: HOSPADM

## 2024-03-22 RX ORDER — PANTOPRAZOLE SODIUM 40 MG/1
40 TABLET, DELAYED RELEASE ORAL
Status: DISCONTINUED | OUTPATIENT
Start: 2024-03-23 | End: 2024-03-26 | Stop reason: HOSPADM

## 2024-03-22 RX ORDER — DEXTROSE MONOHYDRATE 100 MG/ML
INJECTION, SOLUTION INTRAVENOUS CONTINUOUS PRN
Status: DISCONTINUED | OUTPATIENT
Start: 2024-03-22 | End: 2024-03-26 | Stop reason: HOSPADM

## 2024-03-22 RX ORDER — ALBUTEROL SULFATE 90 UG/1
2 AEROSOL, METERED RESPIRATORY (INHALATION) EVERY 4 HOURS PRN
Status: DISCONTINUED | OUTPATIENT
Start: 2024-03-22 | End: 2024-03-22 | Stop reason: CLARIF

## 2024-03-22 RX ORDER — SODIUM CHLORIDE 0.9 % (FLUSH) 0.9 %
5-40 SYRINGE (ML) INJECTION PRN
Status: DISCONTINUED | OUTPATIENT
Start: 2024-03-22 | End: 2024-03-26 | Stop reason: HOSPADM

## 2024-03-22 RX ORDER — SODIUM CHLORIDE 0.9 % (FLUSH) 0.9 %
5-40 SYRINGE (ML) INJECTION EVERY 12 HOURS SCHEDULED
Status: DISCONTINUED | OUTPATIENT
Start: 2024-03-22 | End: 2024-03-26 | Stop reason: HOSPADM

## 2024-03-22 RX ADMIN — SODIUM CHLORIDE, PRESERVATIVE FREE 10 ML: 5 INJECTION INTRAVENOUS at 23:47

## 2024-03-22 RX ADMIN — INSULIN GLARGINE 15 UNITS: 100 INJECTION, SOLUTION SUBCUTANEOUS at 23:46

## 2024-03-22 RX ADMIN — SODIUM CHLORIDE 500 ML: 9 INJECTION, SOLUTION INTRAVENOUS at 12:15

## 2024-03-22 RX ADMIN — INSULIN LISPRO 4 UNITS: 100 INJECTION, SOLUTION INTRAVENOUS; SUBCUTANEOUS at 23:45

## 2024-03-22 RX ADMIN — INSULIN HUMAN 6 UNITS: 100 INJECTION, SOLUTION PARENTERAL at 14:42

## 2024-03-22 NOTE — ED PROVIDER NOTES
dizziness and headaches.         Nursing Notes were all reviewed and agreed with or any disagreements were addressed in the HPI.    REVIEW OF SYSTEMS :      Positives and Pertinent negatives as per HPI.     SURGICAL HISTORY     Past Surgical History:   Procedure Laterality Date     SECTION      CYST INCISION AND DRAINAGE  2011    perirectal abscess. [facility]. Dr. Fleming    DIALYSIS FISTULA CREATION Left 2019    LEFT ARM FISTULAGRAM, BALLOON ANGIOPLASTY performed by Haylee Zaidi MD at [facility] OR    DILATION AND CURETTAGE OF UTERUS      FRACTURE SURGERY      fracture right ulnar, left tibia, and pelvis    INVASIVE VASCULAR N/A 2023    Fistulogram left performed by Haylee Zaidi MD at [facility]    INVASIVE VASCULAR Left 2023    Angioplasty fistula/dialysis circuit performed by Haylee Zaidi MD at [facility]    OTHER SURGICAL HISTORY      open reduction internal fixation left radial shaft    OTHER SURGICAL HISTORY  2016    pericardial window    OTHER SURGICAL HISTORY  2016     r tesio insertion  / remove 2018    OTHER SURGICAL HISTORY Left 2017    insertion a-v fistula left arm    NC ARTERIOVENOUS ANASTOMOSIS OPEN DIRECT Left 2018    REVISION AV FISTULA - LEFT ARM performed by Haylee Zaidi MD at [facility]    NC INSJ NON-TUNNELED CENTRAL VENOUS CATH AGE 5 YR/> N/A 5/10/2018    TESIO CATHETER INSERTION performed by Cornelius Morris MD at [facility] OR    NC VITRECTOMY PARS PLANA REMOVE PRERETINAL MEMBRANE Left 2018    PARS PLANA VITRECTOMY 25 GAUGE, VITREOUS HEMORRHAGE REMOVAL, ENDO LASER, AIR/FLUID  EXCHANGE LEFT EYE performed by Pierce Fierro MD at [facility] OR    RECTAL SURGERY N/A 2021    PERINEAL ABCESS INCISION AND DRAINAGE (LITHOTOMY) performed by Dominic Brower MD at [facility]    UPPER GASTROINTESTINAL ENDOSCOPY N/A 2024    EGD ESOPHAGOGASTRODUODENOSCOPY performed

## 2024-03-23 PROBLEM — E11.65 HYPERGLYCEMIA DUE TO DIABETES MELLITUS (HCC): Status: ACTIVE | Noted: 2024-03-22

## 2024-03-23 LAB
ABO/RH: NORMAL
ANION GAP SERPL CALCULATED.3IONS-SCNC: 15 MMOL/L (ref 7–16)
ANTIBODY SCREEN: NEGATIVE
ARM BAND NUMBER: NORMAL
BASOPHILS # BLD: 0.02 K/UL (ref 0–0.2)
BASOPHILS NFR BLD: 0 % (ref 0–2)
BLOOD BANK BLOOD PRODUCT EXPIRATION DATE: NORMAL
BLOOD BANK BLOOD PRODUCT EXPIRATION DATE: NORMAL
BLOOD BANK DISPENSE STATUS: NORMAL
BLOOD BANK DISPENSE STATUS: NORMAL
BLOOD BANK ISBT PRODUCT BLOOD TYPE: 5100
BLOOD BANK ISBT PRODUCT BLOOD TYPE: 5100
BLOOD BANK PRODUCT CODE: NORMAL
BLOOD BANK PRODUCT CODE: NORMAL
BLOOD BANK SAMPLE EXPIRATION: NORMAL
BLOOD BANK UNIT TYPE AND RH: NORMAL
BLOOD BANK UNIT TYPE AND RH: NORMAL
BPU ID: NORMAL
BPU ID: NORMAL
BUN SERPL-MCNC: 54 MG/DL (ref 6–20)
CALCIUM SERPL-MCNC: 9.2 MG/DL (ref 8.6–10.2)
CHLORIDE SERPL-SCNC: 96 MMOL/L (ref 98–107)
CO2 SERPL-SCNC: 28 MMOL/L (ref 22–29)
COMPONENT: NORMAL
COMPONENT: NORMAL
CREAT SERPL-MCNC: 7 MG/DL (ref 0.5–1)
CROSSMATCH RESULT: NORMAL
CROSSMATCH RESULT: NORMAL
EKG ATRIAL RATE: 100 BPM
EKG P AXIS: 61 DEGREES
EKG P-R INTERVAL: 124 MS
EKG Q-T INTERVAL: 352 MS
EKG QRS DURATION: 68 MS
EKG QTC CALCULATION (BAZETT): 454 MS
EKG R AXIS: 32 DEGREES
EKG T AXIS: 49 DEGREES
EKG VENTRICULAR RATE: 100 BPM
EOSINOPHIL # BLD: 0.23 K/UL (ref 0.05–0.5)
EOSINOPHILS RELATIVE PERCENT: 4 % (ref 0–6)
ERYTHROCYTE [DISTWIDTH] IN BLOOD BY AUTOMATED COUNT: 17.9 % (ref 11.5–15)
FERRITIN SERPL-MCNC: 460 NG/ML
FOLATE SERPL-MCNC: 12.3 NG/ML (ref 4.8–24.2)
GFR SERPL CREATININE-BSD FRML MDRD: 7 ML/MIN/1.73M2
GLUCOSE BLD-MCNC: 130 MG/DL (ref 74–99)
GLUCOSE BLD-MCNC: 244 MG/DL (ref 74–99)
GLUCOSE BLD-MCNC: 351 MG/DL (ref 74–99)
GLUCOSE BLD-MCNC: 353 MG/DL (ref 74–99)
GLUCOSE SERPL-MCNC: 188 MG/DL (ref 74–99)
HBA1C MFR BLD: 7.5 % (ref 4–5.6)
HCT VFR BLD AUTO: 21.8 % (ref 34–48)
HGB BLD-MCNC: 7.6 G/DL (ref 11.5–15.5)
IMM GRANULOCYTES # BLD AUTO: <0.03 K/UL (ref 0–0.58)
IMM GRANULOCYTES NFR BLD: 0 % (ref 0–5)
IRON SATN MFR SERPL: 50 % (ref 15–50)
IRON SERPL-MCNC: 116 UG/DL (ref 37–145)
LYMPHOCYTES NFR BLD: 1.18 K/UL (ref 1.5–4)
LYMPHOCYTES RELATIVE PERCENT: 18 % (ref 20–42)
MCH RBC QN AUTO: 32.1 PG (ref 26–35)
MCHC RBC AUTO-ENTMCNC: 34.9 G/DL (ref 32–34.5)
MCV RBC AUTO: 92 FL (ref 80–99.9)
MONOCYTES NFR BLD: 0.5 K/UL (ref 0.1–0.95)
MONOCYTES NFR BLD: 8 % (ref 2–12)
NEUTROPHILS NFR BLD: 70 % (ref 43–80)
NEUTS SEG NFR BLD: 4.52 K/UL (ref 1.8–7.3)
PLATELET # BLD AUTO: 223 K/UL (ref 130–450)
PMV BLD AUTO: 9.7 FL (ref 7–12)
POTASSIUM SERPL-SCNC: 4.6 MMOL/L (ref 3.5–5)
RBC # BLD AUTO: 2.37 M/UL (ref 3.5–5.5)
SODIUM SERPL-SCNC: 139 MMOL/L (ref 132–146)
TIBC SERPL-MCNC: 230 UG/DL (ref 250–450)
TRANSFUSION STATUS: NORMAL
TRANSFUSION STATUS: NORMAL
UNIT DIVISION: 0
UNIT DIVISION: 0
UNIT ISSUE DATE/TIME: NORMAL
UNIT ISSUE DATE/TIME: NORMAL
VIT B12 SERPL-MCNC: 1852 PG/ML (ref 211–946)
WBC OTHER # BLD: 6.5 K/UL (ref 4.5–11.5)

## 2024-03-23 PROCEDURE — 80048 BASIC METABOLIC PNL TOTAL CA: CPT

## 2024-03-23 PROCEDURE — 83550 IRON BINDING TEST: CPT

## 2024-03-23 PROCEDURE — 6360000002 HC RX W HCPCS: Performed by: STUDENT IN AN ORGANIZED HEALTH CARE EDUCATION/TRAINING PROGRAM

## 2024-03-23 PROCEDURE — 96375 TX/PRO/DX INJ NEW DRUG ADDON: CPT

## 2024-03-23 PROCEDURE — 6370000000 HC RX 637 (ALT 250 FOR IP): Performed by: STUDENT IN AN ORGANIZED HEALTH CARE EDUCATION/TRAINING PROGRAM

## 2024-03-23 PROCEDURE — 83036 HEMOGLOBIN GLYCOSYLATED A1C: CPT

## 2024-03-23 PROCEDURE — 2700000000 HC OXYGEN THERAPY PER DAY

## 2024-03-23 PROCEDURE — 82607 VITAMIN B-12: CPT

## 2024-03-23 PROCEDURE — 93010 ELECTROCARDIOGRAM REPORT: CPT | Performed by: INTERNAL MEDICINE

## 2024-03-23 PROCEDURE — 96376 TX/PRO/DX INJ SAME DRUG ADON: CPT

## 2024-03-23 PROCEDURE — 99232 SBSQ HOSP IP/OBS MODERATE 35: CPT | Performed by: STUDENT IN AN ORGANIZED HEALTH CARE EDUCATION/TRAINING PROGRAM

## 2024-03-23 PROCEDURE — 85025 COMPLETE CBC W/AUTO DIFF WBC: CPT

## 2024-03-23 PROCEDURE — 99222 1ST HOSP IP/OBS MODERATE 55: CPT | Performed by: INTERNAL MEDICINE

## 2024-03-23 PROCEDURE — 2060000000 HC ICU INTERMEDIATE R&B

## 2024-03-23 PROCEDURE — 82728 ASSAY OF FERRITIN: CPT

## 2024-03-23 PROCEDURE — 82962 GLUCOSE BLOOD TEST: CPT

## 2024-03-23 PROCEDURE — 6370000000 HC RX 637 (ALT 250 FOR IP)

## 2024-03-23 PROCEDURE — 36415 COLL VENOUS BLD VENIPUNCTURE: CPT

## 2024-03-23 PROCEDURE — 6370000000 HC RX 637 (ALT 250 FOR IP): Performed by: INTERNAL MEDICINE

## 2024-03-23 PROCEDURE — 83540 ASSAY OF IRON: CPT

## 2024-03-23 PROCEDURE — G0378 HOSPITAL OBSERVATION PER HR: HCPCS

## 2024-03-23 PROCEDURE — 94640 AIRWAY INHALATION TREATMENT: CPT

## 2024-03-23 PROCEDURE — 6360000002 HC RX W HCPCS: Performed by: INTERNAL MEDICINE

## 2024-03-23 PROCEDURE — 90935 HEMODIALYSIS ONE EVALUATION: CPT

## 2024-03-23 PROCEDURE — 82746 ASSAY OF FOLIC ACID SERUM: CPT

## 2024-03-23 PROCEDURE — 2580000003 HC RX 258: Performed by: INTERNAL MEDICINE

## 2024-03-23 RX ORDER — INSULIN GLARGINE 100 [IU]/ML
12 INJECTION, SOLUTION SUBCUTANEOUS NIGHTLY
Status: DISCONTINUED | OUTPATIENT
Start: 2024-03-23 | End: 2024-03-24

## 2024-03-23 RX ORDER — INSULIN LISPRO 100 [IU]/ML
2 INJECTION, SOLUTION INTRAVENOUS; SUBCUTANEOUS
Status: DISCONTINUED | OUTPATIENT
Start: 2024-03-23 | End: 2024-03-24

## 2024-03-23 RX ORDER — INSULIN LISPRO 100 [IU]/ML
0-6 INJECTION, SOLUTION INTRAVENOUS; SUBCUTANEOUS
Status: DISCONTINUED | OUTPATIENT
Start: 2024-03-23 | End: 2024-03-26 | Stop reason: HOSPADM

## 2024-03-23 RX ORDER — OXYCODONE HYDROCHLORIDE AND ACETAMINOPHEN 5; 325 MG/1; MG/1
1 TABLET ORAL EVERY 4 HOURS PRN
Status: DISCONTINUED | OUTPATIENT
Start: 2024-03-23 | End: 2024-03-26 | Stop reason: HOSPADM

## 2024-03-23 RX ADMIN — INSULIN LISPRO 2 UNITS: 100 INJECTION, SOLUTION INTRAVENOUS; SUBCUTANEOUS at 19:06

## 2024-03-23 RX ADMIN — INSULIN LISPRO 5 UNITS: 100 INJECTION, SOLUTION INTRAVENOUS; SUBCUTANEOUS at 19:06

## 2024-03-23 RX ADMIN — CLOPIDOGREL 75 MG: 75 TABLET, FILM COATED ORAL at 08:46

## 2024-03-23 RX ADMIN — PANTOPRAZOLE SODIUM 40 MG: 40 TABLET, DELAYED RELEASE ORAL at 06:40

## 2024-03-23 RX ADMIN — SODIUM CHLORIDE, PRESERVATIVE FREE 10 ML: 5 INJECTION INTRAVENOUS at 21:10

## 2024-03-23 RX ADMIN — HYDROMORPHONE HYDROCHLORIDE 0.5 MG: 1 INJECTION, SOLUTION INTRAMUSCULAR; INTRAVENOUS; SUBCUTANEOUS at 19:52

## 2024-03-23 RX ADMIN — INSULIN GLARGINE 12 UNITS: 100 INJECTION, SOLUTION SUBCUTANEOUS at 21:01

## 2024-03-23 RX ADMIN — SODIUM CHLORIDE, PRESERVATIVE FREE 5 ML: 5 INJECTION INTRAVENOUS at 08:54

## 2024-03-23 RX ADMIN — FAMOTIDINE 20 MG: 20 TABLET, FILM COATED ORAL at 08:46

## 2024-03-23 RX ADMIN — INSULIN LISPRO 4 UNITS: 100 INJECTION, SOLUTION INTRAVENOUS; SUBCUTANEOUS at 21:02

## 2024-03-23 RX ADMIN — OXYCODONE HYDROCHLORIDE AND ACETAMINOPHEN 1 TABLET: 5; 325 TABLET ORAL at 21:02

## 2024-03-23 RX ADMIN — ALBUTEROL SULFATE 2.5 MG: 2.5 SOLUTION RESPIRATORY (INHALATION) at 00:15

## 2024-03-23 RX ADMIN — INSULIN LISPRO 2 UNITS: 100 INJECTION, SOLUTION INTRAVENOUS; SUBCUTANEOUS at 13:25

## 2024-03-23 RX ADMIN — INSULIN LISPRO 2 UNITS: 100 INJECTION, SOLUTION INTRAVENOUS; SUBCUTANEOUS at 08:53

## 2024-03-23 RX ADMIN — INSULIN LISPRO 1 UNITS: 100 INJECTION, SOLUTION INTRAVENOUS; SUBCUTANEOUS at 08:53

## 2024-03-23 RX ADMIN — HYDROMORPHONE HYDROCHLORIDE 0.5 MG: 1 INJECTION, SOLUTION INTRAMUSCULAR; INTRAVENOUS; SUBCUTANEOUS at 23:37

## 2024-03-23 RX ADMIN — CARVEDILOL 50 MG: 25 TABLET, FILM COATED ORAL at 08:46

## 2024-03-23 ASSESSMENT — PAIN SCALES - GENERAL
PAINLEVEL_OUTOF10: 8
PAINLEVEL_OUTOF10: 8
PAINLEVEL_OUTOF10: 7

## 2024-03-23 ASSESSMENT — PAIN DESCRIPTION - DESCRIPTORS
DESCRIPTORS: DISCOMFORT;ACHING
DESCRIPTORS: ACHING;SHOOTING;THROBBING
DESCRIPTORS: ACHING

## 2024-03-23 ASSESSMENT — PAIN DESCRIPTION - ORIENTATION
ORIENTATION: RIGHT
ORIENTATION: ANTERIOR;MID

## 2024-03-23 ASSESSMENT — PAIN DESCRIPTION - LOCATION
LOCATION: SHOULDER
LOCATION: SHOULDER
LOCATION: ABDOMEN

## 2024-03-23 NOTE — FLOWSHEET NOTE
03/23/24 1822   Vital Signs   /64   Temp 98.3 °F (36.8 °C)   Pulse 94   Respirations 16   Weight - Scale 74.1 kg (163 lb 5.8 oz)   Weight Method Bed scale   Percent Weight Change 5.56   Pain Assessment   Pain Assessment None - Denies Pain   Post-Hemodialysis Assessment   Post-Treatment Procedures Blood returned;Access bleeding time > 10 minutes   Machine Disinfection Process Acid/Vinegar Clean;Heat Disinfect;Exterior Machine Disinfection   Blood Volume Processed (Liters) 99.6 L   Dialyzer Clearance Lightly streaked   Duration of Treatment (minutes) 240 minutes   Hemodialysis Intake (ml) 300 ml   Hemodialysis Output (ml) 3400 ml   NET Removed (ml) 3100   Tolerated Treatment Good   Bilateral Breath Sounds Clear   Edema Left upper extremity;Right lower extremity;Right upper extremity;Left lower extremity   RUE Edema +1   LUE Edema +1   RLE Edema +1   LLE Edema +1   Time Off 1807   Patient Disposition Return to room   Observations & Evaluations   Level of Consciousness 0   Heart Rhythm Regular   Respiratory Quality/Effort Unlabored   O2 Device Nasal cannula   Skin Color Pink   Skin Condition/Temp Warm;Dry   Abdomen Inspection Soft   Bowel Sounds (All Quadrants) Active

## 2024-03-23 NOTE — H&P
[facility] History and Physical      CHIEF COMPLAINT: High sugar    History of Present Illness: This is 38-year-old -American female with past medical history of end-stage renal disease on dialysis, diabetes melitis, hypertension came here for dialysis and then she developed shortness of breath and lethargic for that reason she transferred to ER.  She did not get dialysis hold week and today she came to get dialysis here and suddenly developed shortness of breath.  Usually she uses 4 L oxygen after COVID infection.  In ER workup her sugar found to be elevated for that reason she being admitted.  She is following with Dr. Obando and as per patient was taking insulin regularly.  Denies any chest pain shortness of breath or any abdominal pain now.    Informant(s) for H&P: Patient    REVIEW OF SYSTEMS:  A comprehensive review of systems was negative except for: what is in the HPI      PMH:  Past Medical History:   Diagnosis Date    JOLLY (acute kidney injury) ([facility]) 2016    AVF (arteriovenous fistula) ([facility]) 2018    let arm    Chronic kidney disease     Dialysis AV fistula malfunction, initial encounter ([facility]) 2019    DM type 1 (diabetes mellitus, type 1) ([facility])     Encounter regarding vascular access for dialysis for ESRD ([facility]) 2018    ESRD (end stage renal disease) ([facility])     Hemodialysis patient ([facility])     amrita dawson  / graft in left arm    Hypertension     Tobacco abuse 2016       Surgical History:  Past Surgical History:   Procedure Laterality Date     SECTION  2013    CYST INCISION AND DRAINAGE  2011    perirectal abscess. [facility]. Dr. Fleming    DIALYSIS FISTULA CREATION Left 2019    LEFT ARM FISTULAGRAM, BALLOON ANGIOPLASTY performed by Haylee Zaidi MD at [facility] OR    DILATION AND CURETTAGE OF UTERUS  2009    FRACTURE SURGERY      fracture right ulnar, left tibia, and pelvis    INVASIVE VASCULAR N/A 2023

## 2024-03-24 ENCOUNTER — APPOINTMENT (OUTPATIENT)
Dept: GENERAL RADIOLOGY | Age: 39
End: 2024-03-24
Payer: MEDICARE

## 2024-03-24 LAB
ANION GAP SERPL CALCULATED.3IONS-SCNC: 15 MMOL/L (ref 7–16)
BASOPHILS # BLD: 0.01 K/UL (ref 0–0.2)
BASOPHILS NFR BLD: 0 % (ref 0–2)
BUN SERPL-MCNC: 20 MG/DL (ref 6–20)
CALCIUM SERPL-MCNC: 8.5 MG/DL (ref 8.6–10.2)
CHLORIDE SERPL-SCNC: 96 MMOL/L (ref 98–107)
CO2 SERPL-SCNC: 28 MMOL/L (ref 22–29)
CREAT SERPL-MCNC: 4.3 MG/DL (ref 0.5–1)
EOSINOPHIL # BLD: 0.19 K/UL (ref 0.05–0.5)
EOSINOPHILS RELATIVE PERCENT: 3 % (ref 0–6)
ERYTHROCYTE [DISTWIDTH] IN BLOOD BY AUTOMATED COUNT: 17.7 % (ref 11.5–15)
GFR SERPL CREATININE-BSD FRML MDRD: 13 ML/MIN/1.73M2
GLUCOSE BLD-MCNC: 105 MG/DL (ref 74–99)
GLUCOSE BLD-MCNC: 138 MG/DL (ref 74–99)
GLUCOSE BLD-MCNC: 251 MG/DL (ref 74–99)
GLUCOSE BLD-MCNC: 388 MG/DL (ref 74–99)
GLUCOSE BLD-MCNC: 85 MG/DL (ref 74–99)
GLUCOSE BLD-MCNC: 88 MG/DL (ref 74–99)
GLUCOSE SERPL-MCNC: 335 MG/DL (ref 74–99)
HCT VFR BLD AUTO: 22.6 % (ref 34–48)
HGB BLD-MCNC: 7.6 G/DL (ref 11.5–15.5)
IMM GRANULOCYTES # BLD AUTO: <0.03 K/UL (ref 0–0.58)
IMM GRANULOCYTES NFR BLD: 0 % (ref 0–5)
LYMPHOCYTES NFR BLD: 0.77 K/UL (ref 1.5–4)
LYMPHOCYTES RELATIVE PERCENT: 11 % (ref 20–42)
MCH RBC QN AUTO: 31.8 PG (ref 26–35)
MCHC RBC AUTO-ENTMCNC: 33.6 G/DL (ref 32–34.5)
MCV RBC AUTO: 94.6 FL (ref 80–99.9)
MONOCYTES NFR BLD: 0.47 K/UL (ref 0.1–0.95)
MONOCYTES NFR BLD: 7 % (ref 2–12)
NEUTROPHILS NFR BLD: 79 % (ref 43–80)
NEUTS SEG NFR BLD: 5.52 K/UL (ref 1.8–7.3)
PLATELET # BLD AUTO: 198 K/UL (ref 130–450)
PMV BLD AUTO: 9.8 FL (ref 7–12)
POTASSIUM SERPL-SCNC: 3.8 MMOL/L (ref 3.5–5)
RBC # BLD AUTO: 2.39 M/UL (ref 3.5–5.5)
SODIUM SERPL-SCNC: 139 MMOL/L (ref 132–146)
WBC OTHER # BLD: 7 K/UL (ref 4.5–11.5)

## 2024-03-24 PROCEDURE — 99232 SBSQ HOSP IP/OBS MODERATE 35: CPT | Performed by: STUDENT IN AN ORGANIZED HEALTH CARE EDUCATION/TRAINING PROGRAM

## 2024-03-24 PROCEDURE — G0378 HOSPITAL OBSERVATION PER HR: HCPCS

## 2024-03-24 PROCEDURE — 80048 BASIC METABOLIC PNL TOTAL CA: CPT

## 2024-03-24 PROCEDURE — 2700000000 HC OXYGEN THERAPY PER DAY

## 2024-03-24 PROCEDURE — 36415 COLL VENOUS BLD VENIPUNCTURE: CPT

## 2024-03-24 PROCEDURE — 6370000000 HC RX 637 (ALT 250 FOR IP): Performed by: INTERNAL MEDICINE

## 2024-03-24 PROCEDURE — 99232 SBSQ HOSP IP/OBS MODERATE 35: CPT | Performed by: INTERNAL MEDICINE

## 2024-03-24 PROCEDURE — 73030 X-RAY EXAM OF SHOULDER: CPT

## 2024-03-24 PROCEDURE — 82962 GLUCOSE BLOOD TEST: CPT

## 2024-03-24 PROCEDURE — 85025 COMPLETE CBC W/AUTO DIFF WBC: CPT

## 2024-03-24 PROCEDURE — 96376 TX/PRO/DX INJ SAME DRUG ADON: CPT

## 2024-03-24 PROCEDURE — 6370000000 HC RX 637 (ALT 250 FOR IP): Performed by: STUDENT IN AN ORGANIZED HEALTH CARE EDUCATION/TRAINING PROGRAM

## 2024-03-24 PROCEDURE — 2060000000 HC ICU INTERMEDIATE R&B

## 2024-03-24 PROCEDURE — 2580000003 HC RX 258: Performed by: INTERNAL MEDICINE

## 2024-03-24 PROCEDURE — 6360000002 HC RX W HCPCS: Performed by: STUDENT IN AN ORGANIZED HEALTH CARE EDUCATION/TRAINING PROGRAM

## 2024-03-24 RX ORDER — POLYETHYLENE GLYCOL 3350 17 G/17G
17 POWDER, FOR SOLUTION ORAL DAILY PRN
Qty: 527 G | Refills: 1 | Status: SHIPPED | OUTPATIENT
Start: 2024-03-24 | End: 2024-05-25

## 2024-03-24 RX ORDER — INSULIN LISPRO 100 [IU]/ML
INJECTION, SOLUTION INTRAVENOUS; SUBCUTANEOUS
Qty: 5 ADJUSTABLE DOSE PRE-FILLED PEN SYRINGE | Refills: 5 | Status: SHIPPED | OUTPATIENT
Start: 2024-03-24

## 2024-03-24 RX ORDER — OXYCODONE HYDROCHLORIDE AND ACETAMINOPHEN 5; 325 MG/1; MG/1
1 TABLET ORAL EVERY 4 HOURS PRN
Qty: 7 TABLET | Refills: 0 | Status: SHIPPED | OUTPATIENT
Start: 2024-03-24 | End: 2024-03-29

## 2024-03-24 RX ORDER — PEN NEEDLE, DIABETIC 32 GX 1/4"
NEEDLE, DISPOSABLE MISCELLANEOUS
Qty: 250 EACH | Refills: 5 | Status: SHIPPED | OUTPATIENT
Start: 2024-03-24

## 2024-03-24 RX ORDER — FAMOTIDINE 20 MG/1
10 TABLET, FILM COATED ORAL EVERY MORNING
Status: DISCONTINUED | OUTPATIENT
Start: 2024-03-25 | End: 2024-03-26 | Stop reason: HOSPADM

## 2024-03-24 RX ORDER — INSULIN GLARGINE 300 U/ML
INJECTION, SOLUTION SUBCUTANEOUS
Qty: 5 ADJUSTABLE DOSE PRE-FILLED PEN SYRINGE | Refills: 4 | Status: SHIPPED | OUTPATIENT
Start: 2024-03-24

## 2024-03-24 RX ORDER — INSULIN GLARGINE 100 [IU]/ML
15 INJECTION, SOLUTION SUBCUTANEOUS NIGHTLY
Status: DISCONTINUED | OUTPATIENT
Start: 2024-03-24 | End: 2024-03-25

## 2024-03-24 RX ORDER — INSULIN LISPRO 100 [IU]/ML
4 INJECTION, SOLUTION INTRAVENOUS; SUBCUTANEOUS
Status: DISCONTINUED | OUTPATIENT
Start: 2024-03-24 | End: 2024-03-26 | Stop reason: HOSPADM

## 2024-03-24 RX ADMIN — OXYCODONE HYDROCHLORIDE AND ACETAMINOPHEN 1 TABLET: 5; 325 TABLET ORAL at 01:11

## 2024-03-24 RX ADMIN — SODIUM CHLORIDE, PRESERVATIVE FREE 10 ML: 5 INJECTION INTRAVENOUS at 21:41

## 2024-03-24 RX ADMIN — OXYCODONE HYDROCHLORIDE AND ACETAMINOPHEN 1 TABLET: 5; 325 TABLET ORAL at 18:00

## 2024-03-24 RX ADMIN — PANTOPRAZOLE SODIUM 40 MG: 40 TABLET, DELAYED RELEASE ORAL at 05:26

## 2024-03-24 RX ADMIN — INSULIN LISPRO 3 UNITS: 100 INJECTION, SOLUTION INTRAVENOUS; SUBCUTANEOUS at 17:55

## 2024-03-24 RX ADMIN — INSULIN LISPRO 4 UNITS: 100 INJECTION, SOLUTION INTRAVENOUS; SUBCUTANEOUS at 17:55

## 2024-03-24 RX ADMIN — CARVEDILOL 25 MG: 25 TABLET, FILM COATED ORAL at 17:55

## 2024-03-24 RX ADMIN — SODIUM CHLORIDE, PRESERVATIVE FREE 10 ML: 5 INJECTION INTRAVENOUS at 08:51

## 2024-03-24 RX ADMIN — OXYCODONE HYDROCHLORIDE AND ACETAMINOPHEN 1 TABLET: 5; 325 TABLET ORAL at 06:38

## 2024-03-24 RX ADMIN — CARVEDILOL 50 MG: 25 TABLET, FILM COATED ORAL at 08:50

## 2024-03-24 RX ADMIN — HYDROMORPHONE HYDROCHLORIDE 0.5 MG: 1 INJECTION, SOLUTION INTRAMUSCULAR; INTRAVENOUS; SUBCUTANEOUS at 03:39

## 2024-03-24 RX ADMIN — INSULIN LISPRO 5 UNITS: 100 INJECTION, SOLUTION INTRAVENOUS; SUBCUTANEOUS at 08:50

## 2024-03-24 RX ADMIN — CLOPIDOGREL 75 MG: 75 TABLET, FILM COATED ORAL at 08:50

## 2024-03-24 RX ADMIN — INSULIN LISPRO 4 UNITS: 100 INJECTION, SOLUTION INTRAVENOUS; SUBCUTANEOUS at 08:50

## 2024-03-24 RX ADMIN — FAMOTIDINE 20 MG: 20 TABLET, FILM COATED ORAL at 08:50

## 2024-03-24 RX ADMIN — PANTOPRAZOLE SODIUM 40 MG: 40 TABLET, DELAYED RELEASE ORAL at 17:55

## 2024-03-24 ASSESSMENT — PAIN SCALES - GENERAL
PAINLEVEL_OUTOF10: 7
PAINLEVEL_OUTOF10: 7
PAINLEVEL_OUTOF10: 9
PAINLEVEL_OUTOF10: 7

## 2024-03-24 ASSESSMENT — PAIN DESCRIPTION - ORIENTATION
ORIENTATION: RIGHT

## 2024-03-24 ASSESSMENT — PAIN DESCRIPTION - DESCRIPTORS
DESCRIPTORS: ACHING;DISCOMFORT
DESCRIPTORS: ACHING;PRESSURE;THROBBING
DESCRIPTORS: ACHING;PRESSURE;THROBBING

## 2024-03-24 ASSESSMENT — PAIN DESCRIPTION - LOCATION
LOCATION: SHOULDER

## 2024-03-24 NOTE — DISCHARGE SUMMARY
Hospitalist Discharge Summary    Patient ID: Michelle Nettles   Patient : 1985  Patient's PCP: May Waters DO    Admit Date: 3/22/2024   Admitting Physician: Timur Ellington MD    Discharge Date:  3/24/2024   Discharge Physician: Trenton Peters MD   Discharge Condition: Stable  Discharge Disposition: Home with Home Health Care      Hospital course in brief:  (Please refer to daily progress notes for a comprehensive review of the hospitalization by requesting medical records)      This is 38-year-old -American female with past medical history of end-stage renal disease on dialysis, diabetes melitis, hypertension came here for dialysis and then she developed shortness of breath and lethargic for that reason she transferred to ER. She did not get dialysis hold week and today she came to get dialysis here and suddenly developed shortness of breath. Usually she uses 4 L oxygen after COVID infection. In ER workup her sugar found to be elevated for that reason she being admitted. She is following with Dr. Obando and as per patient was taking insulin regularly. Denies any chest pain shortness of breath or any abdominal pain now.   She missed 2 HD session due to court issues.  3/23 - complaining of right shoulder pain, ortho consulted for unrelenting pain   3/24 Xray shoulder reviewed, no acute fracture or dislocation, pain well controlled, OP follow up with Orthopedic Surgery.   She is clinically and HD stable and is appropriate for OP follow up.  Need to maintain compliance with Hemodialysis.  Insulin regimen has been adjusted per Endocrinology recommendations.      Consults:   IP CONSULT TO INTERNAL MEDICINE  IP CONSULT TO ENDOCRINOLOGY  IP CONSULT TO ORTHOPEDIC SURGERY    Discharge Diagnoses:    Volume Overload 2/2 missing HD   ESRD on HD   Acute on Chronic Anemia   Anemia of Chronic Disease  Nicotine dependence  COPD on home oxygen   Type 1 DM  HTN        Discharge Instructions /

## 2024-03-24 NOTE — CARE COORDINATION
Discharge order noted. Spoke to pt at bedside, she tells me she is returning home, will need transport to her car @ [facility] as she was brought in by ambulance from there. Nurse notified. Gave pt number for [facility] to inquire about aides services. Reach out to CM for any discharge needs that arise (TF)      NOLAN Kee,RN  Case Management  [phone]

## 2024-03-24 NOTE — CONSULTS
Nephrology Consult  [facility]  Severo France MD    CC:   esrd    HPI:   the pt is a 39 yo female with a pmh of esrd, htn, dm, tobacco abuse who is noncompliant with hd. She did not go for hd for at least a week and then became sob. She was sent to the ER. From hd yesterday.  In the ER hgb was found to be 5.1. she was transfused 2 units of prbc. She will be getting hd today. Labs show na 139 k 4.6 co2 28 bun 54 ca 9.2 lactic acid 2.1 wbc 6.5, hgb 7.6 plt 223, glucose 514>664, bhb 4.24. vitals show temp 97.4, hr 84 rr 17 bp 108/46. She is fluid overloaded in her face and abd. She had blurry fision and dizziness which is improving.     PMH:    Past Medical History:   Diagnosis Date    JOLLY (acute kidney injury) ([facility]) 4/5/2016    AVF (arteriovenous fistula) ([facility]) 02/19/2018    let arm    Chronic kidney disease     Dialysis AV fistula malfunction, initial encounter ([facility]) 11/7/2019    DM type 1 (diabetes mellitus, type 1) ([facility])     Encounter regarding vascular access for dialysis for ESRD ([facility]) 07/12/2018    ESRD (end stage renal disease) ([facility])     Hemodialysis patient ([facility])     amrita dawson mon wed fri / graft in left arm    Hypertension     Tobacco abuse 4/5/2016       Patient Active Problem List   Diagnosis    Type 1 diabetes mellitus with chronic kidney disease on chronic dialysis ([facility])    HTN (hypertension), benign    Facial cellulitis    Acute hyperkalemia    Nasal polyp    Periorbital cellulitis of right eye    Periorbital cellulitis    Vitamin B deficiency, unspecified    Nasal congestion    Hidradenitis suppurativa    Symptomatic anemia    Anemia    Gall stone    Metabolic encephalopathy    Hypoxia    Hypotension    History of venous thromboembolism    Chronic systolic (congestive) heart failure    MRSA colonization    ESRD on hemodialysis ([facility])    SOB (shortness of breath)    Chronic hypoxic respiratory failure, on home oxygen therapy ([facility])    Acute on chronic anemia    Hyperglycemia due to type 1 
Stroke Mother     Cancer Father     Diabetes Paternal Aunt        REVIEW OF SYSTEMS:   Skin: no acute changes  Eyes: no acute changes  Ears/Nose/Throat: no acute changes  Respiratory: No increased work of breathing, no coughing  Cardiovascular: Brisk capillary refill bilaterally, well perfused extremities  Gastrointestinal: no acute changes  Neurologic: no acute changes  MUSCULOSKELETAL:  positive for  pain      PHYSICAL EXAM:    VITALS:  /70   Pulse 84   Temp 98 °F (36.7 °C) (Oral)   Resp 18   Ht 1.499 m (4' 11\")   Wt 74.1 kg (163 lb 5.8 oz)   SpO2 99%   BMI 32.99 kg/m²   Constitutional: Oriented to person, place, and time; Answer questions appropriately  HENT: Head: Atraumatic.   Eyes: EOMI  Neck: Trachea midline  Cardiovascular: Brisk capillary refill to all extremities, extremities well perfused  Pulmonary/Chest: No increased work of breathing, no cough  Abdominal: Non-distended  Neurologic:  Awake, alert and oriented in three planes. No gross deficits   MUSCULOSKELETAL:  Right upper Extremity:  Unable to completely assess due to patient's lack of attention  Noted near full range of motion without discomfort as she answered and talked on the phone  Mild tenderness to palpation over the anterior aspect of the shoulder  Skin intact circumferentially  Compartments soft and compressible  +AIN/PIN/Ulnar nerve function intact grossly  +Radial pulse, Brisk Cap refill, hand warm and perfused  Sensation intact to light touch in right hand    Secondary Exam:   leftUE: No obvious signs of trauma.  -TTP to fingers, hand, wrist, forearm, elbow, humerus, shoulder or clavicle.-- Patient able to flex/extend fingers, wrist, elbow and shoulder with active and passive ROM without pain, +2/4 Radial pulse, compartments soft and compressible.    bilateralLE: No obvious signs of trauma.   -TTP to foot, ankle, leg, knee, thigh, hip.-- Patient able to flex/extend toes, ankle, knee and hip with active and passive ROM without 
07/27/2011 04:40 PM    MALBCR 1988.2 10/08/2013 10:30 AM    LABCREA 61 08/17/2016 04:00 PM     Lab Results   Component Value Date/Time    TRIG 68 10/08/2013 10:40 AM    HDL 40 01/29/2021 06:14 AM    LDLCALC 41 01/29/2021 06:14 AM    CHOL 155 10/08/2013 10:40 AM       Blood culture   Lab Results   Component Value Date/Time    BC 5 Days no growth 06/03/2023 08:36 AM    BC 5 Days no growth 03/22/2023 02:01 PM       Radiology:  No orders to display       Medical Records/Labs/Images review:   I personally reviewed and summarized previous records   All labs and imaging were reviewed independently     ASSESSMENT & PLAN   Michelle ORDONEZ Lencho Nettles, a 38 y.o.-old female seen today for inpatient diabetes management    Diabetes Mellitus type 1  Patient's diabetes is very brittle   We recommend the following DM regimen  Lantus 12 units nightly  Humalog 2 units 3 times daily with meals  Low-dose sliding scale ACHS  Continue glucose check with meals and at bedtime  Will titrate insulin dose based on the blood glucose trend & insulin requirement  The patient just received insulin pump at home.  She is planning to meet with our pump  in few days after discharge to start insulin pump therapy.  Upon discharge, I will arrange for the patient to be seen in the endocrinology clinic for routine diabetes maintenance and prevention    Dietary noncompliance  Discussed with patient the importance of eating consistent carbohydrate meals, avoiding high glycemic index food. Also, discussed with patient the risk and negative consequences of dietary noncompliance on blood glucose control, blood pressure and weight    End-stage renal disease.  On hemodialysis.  We will closely monitor blood sugar as patient at higher risk of hypoglycemia due to end-stage renal disease.    Interdisciplinary plan for communication with healthcare providers:   Consult recommendations were discussed with the Primary Service/Nursing staff      The above issues

## 2024-03-24 NOTE — PLAN OF CARE
Patient was planned for discharge today.    However, she has been planned for HD by Nephrology for 2 hrs tomorrow. She can be discharged after HD in AM.    Please consider my discharge summary from 3/24 as my progress note for that day.    No other changes.    Trenton Peters MD

## 2024-03-25 PROBLEM — Z91.119 DIETARY NONCOMPLIANCE: Status: ACTIVE | Noted: 2021-01-27

## 2024-03-25 LAB
ANION GAP SERPL CALCULATED.3IONS-SCNC: 14 MMOL/L (ref 7–16)
BASOPHILS # BLD: 0.02 K/UL (ref 0–0.2)
BASOPHILS NFR BLD: 0 % (ref 0–2)
BUN SERPL-MCNC: 31 MG/DL (ref 6–20)
CALCIUM SERPL-MCNC: 9 MG/DL (ref 8.6–10.2)
CHLORIDE SERPL-SCNC: 94 MMOL/L (ref 98–107)
CO2 SERPL-SCNC: 28 MMOL/L (ref 22–29)
CREAT SERPL-MCNC: 6.6 MG/DL (ref 0.5–1)
EOSINOPHIL # BLD: 0.24 K/UL (ref 0.05–0.5)
EOSINOPHILS RELATIVE PERCENT: 5 % (ref 0–6)
ERYTHROCYTE [DISTWIDTH] IN BLOOD BY AUTOMATED COUNT: 17.2 % (ref 11.5–15)
GFR SERPL CREATININE-BSD FRML MDRD: 8 ML/MIN/1.73M2
GLUCOSE BLD-MCNC: 137 MG/DL (ref 74–99)
GLUCOSE BLD-MCNC: 137 MG/DL (ref 74–99)
GLUCOSE BLD-MCNC: 185 MG/DL (ref 74–99)
GLUCOSE BLD-MCNC: 344 MG/DL (ref 74–99)
GLUCOSE BLD-MCNC: 368 MG/DL (ref 74–99)
GLUCOSE SERPL-MCNC: 324 MG/DL (ref 74–99)
HCT VFR BLD AUTO: 22.5 % (ref 34–48)
HGB BLD-MCNC: 7.4 G/DL (ref 11.5–15.5)
IMM GRANULOCYTES # BLD AUTO: <0.03 K/UL (ref 0–0.58)
IMM GRANULOCYTES NFR BLD: 0 % (ref 0–5)
LYMPHOCYTES NFR BLD: 0.96 K/UL (ref 1.5–4)
LYMPHOCYTES RELATIVE PERCENT: 19 % (ref 20–42)
MCH RBC QN AUTO: 31.2 PG (ref 26–35)
MCHC RBC AUTO-ENTMCNC: 32.9 G/DL (ref 32–34.5)
MCV RBC AUTO: 94.9 FL (ref 80–99.9)
MONOCYTES NFR BLD: 0.43 K/UL (ref 0.1–0.95)
MONOCYTES NFR BLD: 8 % (ref 2–12)
NEUTROPHILS NFR BLD: 68 % (ref 43–80)
NEUTS SEG NFR BLD: 3.53 K/UL (ref 1.8–7.3)
PHOSPHATE SERPL-MCNC: 6.5 MG/DL (ref 2.5–4.5)
PLATELET # BLD AUTO: 196 K/UL (ref 130–450)
PMV BLD AUTO: 9.7 FL (ref 7–12)
POTASSIUM SERPL-SCNC: 4.3 MMOL/L (ref 3.5–5)
PTH-INTACT SERPL-MCNC: 266.3 PG/ML (ref 15–65)
RBC # BLD AUTO: 2.37 M/UL (ref 3.5–5.5)
SODIUM SERPL-SCNC: 136 MMOL/L (ref 132–146)
WBC OTHER # BLD: 5.2 K/UL (ref 4.5–11.5)

## 2024-03-25 PROCEDURE — 83970 ASSAY OF PARATHORMONE: CPT

## 2024-03-25 PROCEDURE — 2580000003 HC RX 258: Performed by: INTERNAL MEDICINE

## 2024-03-25 PROCEDURE — 84100 ASSAY OF PHOSPHORUS: CPT

## 2024-03-25 PROCEDURE — 6370000000 HC RX 637 (ALT 250 FOR IP): Performed by: INTERNAL MEDICINE

## 2024-03-25 PROCEDURE — 36415 COLL VENOUS BLD VENIPUNCTURE: CPT

## 2024-03-25 PROCEDURE — 6370000000 HC RX 637 (ALT 250 FOR IP): Performed by: STUDENT IN AN ORGANIZED HEALTH CARE EDUCATION/TRAINING PROGRAM

## 2024-03-25 PROCEDURE — 2700000000 HC OXYGEN THERAPY PER DAY

## 2024-03-25 PROCEDURE — 90945 DIALYSIS ONE EVALUATION: CPT

## 2024-03-25 PROCEDURE — 85025 COMPLETE CBC W/AUTO DIFF WBC: CPT

## 2024-03-25 PROCEDURE — 99232 SBSQ HOSP IP/OBS MODERATE 35: CPT | Performed by: INTERNAL MEDICINE

## 2024-03-25 PROCEDURE — G0378 HOSPITAL OBSERVATION PER HR: HCPCS

## 2024-03-25 PROCEDURE — 80048 BASIC METABOLIC PNL TOTAL CA: CPT

## 2024-03-25 PROCEDURE — 82962 GLUCOSE BLOOD TEST: CPT

## 2024-03-25 RX ORDER — INSULIN GLARGINE 100 [IU]/ML
12 INJECTION, SOLUTION SUBCUTANEOUS NIGHTLY
Status: DISCONTINUED | OUTPATIENT
Start: 2024-03-25 | End: 2024-03-26 | Stop reason: HOSPADM

## 2024-03-25 RX ADMIN — INSULIN LISPRO 1 UNITS: 100 INJECTION, SOLUTION INTRAVENOUS; SUBCUTANEOUS at 17:42

## 2024-03-25 RX ADMIN — OXYCODONE HYDROCHLORIDE AND ACETAMINOPHEN 1 TABLET: 5; 325 TABLET ORAL at 13:01

## 2024-03-25 RX ADMIN — OXYCODONE HYDROCHLORIDE AND ACETAMINOPHEN 1 TABLET: 5; 325 TABLET ORAL at 06:25

## 2024-03-25 RX ADMIN — SODIUM CHLORIDE, PRESERVATIVE FREE 10 ML: 5 INJECTION INTRAVENOUS at 10:47

## 2024-03-25 RX ADMIN — INSULIN LISPRO 4 UNITS: 100 INJECTION, SOLUTION INTRAVENOUS; SUBCUTANEOUS at 06:27

## 2024-03-25 RX ADMIN — FAMOTIDINE 10 MG: 20 TABLET ORAL at 10:46

## 2024-03-25 RX ADMIN — INSULIN LISPRO 4 UNITS: 100 INJECTION, SOLUTION INTRAVENOUS; SUBCUTANEOUS at 17:42

## 2024-03-25 RX ADMIN — CARVEDILOL 50 MG: 25 TABLET, FILM COATED ORAL at 10:46

## 2024-03-25 RX ADMIN — INSULIN GLARGINE 12 UNITS: 100 INJECTION, SOLUTION SUBCUTANEOUS at 21:28

## 2024-03-25 RX ADMIN — OXYCODONE HYDROCHLORIDE AND ACETAMINOPHEN 1 TABLET: 5; 325 TABLET ORAL at 22:41

## 2024-03-25 RX ADMIN — PANTOPRAZOLE SODIUM 40 MG: 40 TABLET, DELAYED RELEASE ORAL at 06:26

## 2024-03-25 RX ADMIN — CARVEDILOL 25 MG: 25 TABLET, FILM COATED ORAL at 17:42

## 2024-03-25 RX ADMIN — INSULIN LISPRO 4 UNITS: 100 INJECTION, SOLUTION INTRAVENOUS; SUBCUTANEOUS at 12:58

## 2024-03-25 RX ADMIN — INSULIN LISPRO 5 UNITS: 100 INJECTION, SOLUTION INTRAVENOUS; SUBCUTANEOUS at 06:26

## 2024-03-25 RX ADMIN — PANTOPRAZOLE SODIUM 40 MG: 40 TABLET, DELAYED RELEASE ORAL at 17:42

## 2024-03-25 RX ADMIN — SODIUM CHLORIDE, PRESERVATIVE FREE 10 ML: 5 INJECTION INTRAVENOUS at 21:29

## 2024-03-25 RX ADMIN — CLOPIDOGREL 75 MG: 75 TABLET, FILM COATED ORAL at 10:46

## 2024-03-25 ASSESSMENT — PAIN DESCRIPTION - LOCATION
LOCATION: SHOULDER

## 2024-03-25 ASSESSMENT — PAIN SCALES - GENERAL
PAINLEVEL_OUTOF10: 10
PAINLEVEL_OUTOF10: 0
PAINLEVEL_OUTOF10: 2
PAINLEVEL_OUTOF10: 9
PAINLEVEL_OUTOF10: 6

## 2024-03-25 ASSESSMENT — PAIN DESCRIPTION - ORIENTATION
ORIENTATION: RIGHT

## 2024-03-25 ASSESSMENT — PAIN DESCRIPTION - DESCRIPTORS
DESCRIPTORS: ACHING;DISCOMFORT;THROBBING;SORE
DESCRIPTORS: ACHING;DISCOMFORT;THROBBING

## 2024-03-25 NOTE — CARE COORDINATION
CM Update: Discharge cancelled yesterday per Nephrology, Requested Ultrafiltration today. Discharge order placed again this AM. Cleared by Nephro as well. Pt concerned her BS has been \"all over the place\" and she doesn't feel she is ready to discharge. Nurse to speak to Attending. She follows with Endocrine outpatient. She has a [employer] on for proper BS monitoring. Spoke to pt at bedside to discuss transition of care. She tells me she lives in a [facility] home with her handicap daughter that is wheelchair bound (she is staying with family while pt is in hospital). Independent with ADL's. O2 through [employer]. Active . PCP . Pharmacy Rishabh Jurado. No anticipated needs at discharge (TF)        HUGO KeeN,RN  Case Management  [phone]

## 2024-03-25 NOTE — DISCHARGE SUMMARY
Hospitalist Discharge Summary    Patient ID: Michelle Nettles   Patient : 1985  Patient's PCP: Mya Waters DO    Admit Date: 3/22/2024   Admitting Physician: Timur Ellington MD    Discharge Date:  3/26/2024   Discharge Physician: Brii Li MD   Discharge Condition: Stable  Discharge Disposition: Home with Home Health Care      Hospital course in brief:  (Please refer to daily progress notes for a comprehensive review of the hospitalization by requesting medical records)      This is 38-year-old -American female with past medical history of end-stage renal disease on dialysis, diabetes melitis, hypertension came here for dialysis and then she developed shortness of breath and lethargic for that reason she transferred to ER. She did not get dialysis hold week and today she came to get dialysis here and suddenly developed shortness of breath. Usually she uses 4 L oxygen after COVID infection. In ER workup her sugar found to be elevated for that reason she being admitted. She is following with Dr. Obando and as per patient was taking insulin regularly. Denies any chest pain shortness of breath or any abdominal pain now.   She missed 2 HD session due to court issues.  3/23 - complaining of right shoulder pain, ortho consulted for unrelenting pain   3/24 Xray shoulder reviewed, no acute fracture or dislocation, pain well controlled, OP follow up with Orthopedic Surgery.   She is clinically and HD stable and is appropriate for OP follow up.  Need to maintain compliance with Hemodialysis.  Insulin regimen has been adjusted per Endocrinology recommendations.      Consults:   IP CONSULT TO INTERNAL MEDICINE  IP CONSULT TO ENDOCRINOLOGY  IP CONSULT TO ORTHOPEDIC SURGERY    Discharge Diagnoses:    Volume Overload 2/2 missing HD   ESRD on HD   Acute on Chronic Anemia   Anemia of Chronic Disease  Nicotine dependence  COPD on home oxygen   Type 1 DM  HTN        Discharge Instructions /

## 2024-03-25 NOTE — FLOWSHEET NOTE
03/25/24 0916   Vital Signs   /69   Temp 97.3 °F (36.3 °C)   Pulse 94   Respirations 16   Weight - Scale 72.7 kg (160 lb 4.4 oz)   Weight Method Bed scale   Percent Weight Change -1.89   Post-Hemodialysis Assessment   Post-Treatment Procedures Blood returned;Access bleeding time < 10 minutes   Machine Disinfection Process Exterior Machine Disinfection   Rinseback Volume (ml) 300 ml   Blood Volume Processed (Liters) 0 L   Dialyzer Clearance Lightly streaked   Duration of Treatment (minutes) 120 minutes   Heparin Amount Administered During Treatment (mL) 0 mL   Hemodialysis Intake (ml) 300 ml   Hemodialysis Output (ml) 2300 ml   NET Removed (ml) 2000   Tolerated Treatment Good   Patient Response to Treatment tolerated well, blood returned, needles pulled, sites held, stasis achieved, bandaids applied, +thirll, +bruit   Observations & Evaluations   Level of Consciousness 0   Oriented X 3   Heart Rhythm Regular   Respiratory Quality/Effort Unlabored   O2 Device Nasal cannula   Skin Condition/Temp Dry;Warm   Abdomen Inspection Soft   Bowel Sounds (All Quadrants) Active

## 2024-03-25 NOTE — ACP (ADVANCE CARE PLANNING)
Advance Care Planning   Healthcare Decision Maker:    Primary Decision Maker: Deana Murillo - Brother/Sister - [phone]    Secondary Decision Maker: Cain Musa - Brother/Sister - [phone]    Supplemental (Other) Decision Maker: Nicole Musa - Other - [phone]    Supplemental (Other) Decision Maker: Barbie Celeste - Brother/Sister - [phone]    Supplemental (Other) Decision Maker: Santosh Pink - Aunt/Uncle - [phone]    Click here to complete Healthcare Decision Makers including selection of the Healthcare Decision Maker Relationship (ie \"Primary\").

## 2024-03-26 VITALS
DIASTOLIC BLOOD PRESSURE: 85 MMHG | HEART RATE: 75 BPM | TEMPERATURE: 98 F | OXYGEN SATURATION: 100 % | RESPIRATION RATE: 20 BRPM | SYSTOLIC BLOOD PRESSURE: 138 MMHG | BODY MASS INDEX: 31.16 KG/M2 | WEIGHT: 154.54 LBS | HEIGHT: 59 IN

## 2024-03-26 LAB
ANION GAP SERPL CALCULATED.3IONS-SCNC: 22 MMOL/L (ref 7–16)
BASOPHILS # BLD: 0.02 K/UL (ref 0–0.2)
BASOPHILS NFR BLD: 0 % (ref 0–2)
BUN SERPL-MCNC: 44 MG/DL (ref 6–20)
CALCIUM SERPL-MCNC: 9.1 MG/DL (ref 8.6–10.2)
CHLORIDE SERPL-SCNC: 94 MMOL/L (ref 98–107)
CO2 SERPL-SCNC: 22 MMOL/L (ref 22–29)
CREAT SERPL-MCNC: 9.2 MG/DL (ref 0.5–1)
EOSINOPHIL # BLD: 0.26 K/UL (ref 0.05–0.5)
EOSINOPHILS RELATIVE PERCENT: 4 % (ref 0–6)
ERYTHROCYTE [DISTWIDTH] IN BLOOD BY AUTOMATED COUNT: 16.9 % (ref 11.5–15)
GFR SERPL CREATININE-BSD FRML MDRD: 5 ML/MIN/1.73M2
GLUCOSE BLD-MCNC: 173 MG/DL (ref 74–99)
GLUCOSE BLD-MCNC: 253 MG/DL (ref 74–99)
GLUCOSE BLD-MCNC: 263 MG/DL (ref 74–99)
GLUCOSE SERPL-MCNC: 224 MG/DL (ref 74–99)
HCT VFR BLD AUTO: 23.5 % (ref 34–48)
HGB BLD-MCNC: 7.7 G/DL (ref 11.5–15.5)
IMM GRANULOCYTES # BLD AUTO: 0.03 K/UL (ref 0–0.58)
IMM GRANULOCYTES NFR BLD: 1 % (ref 0–5)
LYMPHOCYTES NFR BLD: 1.2 K/UL (ref 1.5–4)
LYMPHOCYTES RELATIVE PERCENT: 20 % (ref 20–42)
MCH RBC QN AUTO: 31.6 PG (ref 26–35)
MCHC RBC AUTO-ENTMCNC: 32.8 G/DL (ref 32–34.5)
MCV RBC AUTO: 96.3 FL (ref 80–99.9)
MONOCYTES NFR BLD: 0.52 K/UL (ref 0.1–0.95)
MONOCYTES NFR BLD: 9 % (ref 2–12)
NEUTROPHILS NFR BLD: 66 % (ref 43–80)
NEUTS SEG NFR BLD: 4.01 K/UL (ref 1.8–7.3)
PLATELET # BLD AUTO: 244 K/UL (ref 130–450)
PMV BLD AUTO: 9.9 FL (ref 7–12)
POTASSIUM SERPL-SCNC: 5 MMOL/L (ref 3.5–5)
RBC # BLD AUTO: 2.44 M/UL (ref 3.5–5.5)
SODIUM SERPL-SCNC: 138 MMOL/L (ref 132–146)
WBC OTHER # BLD: 6 K/UL (ref 4.5–11.5)

## 2024-03-26 PROCEDURE — 99239 HOSP IP/OBS DSCHRG MGMT >30: CPT | Performed by: INTERNAL MEDICINE

## 2024-03-26 PROCEDURE — 6370000000 HC RX 637 (ALT 250 FOR IP): Performed by: INTERNAL MEDICINE

## 2024-03-26 PROCEDURE — 80048 BASIC METABOLIC PNL TOTAL CA: CPT

## 2024-03-26 PROCEDURE — 36415 COLL VENOUS BLD VENIPUNCTURE: CPT

## 2024-03-26 PROCEDURE — 6370000000 HC RX 637 (ALT 250 FOR IP): Performed by: STUDENT IN AN ORGANIZED HEALTH CARE EDUCATION/TRAINING PROGRAM

## 2024-03-26 PROCEDURE — 82962 GLUCOSE BLOOD TEST: CPT

## 2024-03-26 PROCEDURE — G0378 HOSPITAL OBSERVATION PER HR: HCPCS

## 2024-03-26 PROCEDURE — 90935 HEMODIALYSIS ONE EVALUATION: CPT

## 2024-03-26 PROCEDURE — 85025 COMPLETE CBC W/AUTO DIFF WBC: CPT

## 2024-03-26 PROCEDURE — 2700000000 HC OXYGEN THERAPY PER DAY

## 2024-03-26 RX ADMIN — INSULIN LISPRO 4 UNITS: 100 INJECTION, SOLUTION INTRAVENOUS; SUBCUTANEOUS at 12:14

## 2024-03-26 RX ADMIN — INSULIN LISPRO 3 UNITS: 100 INJECTION, SOLUTION INTRAVENOUS; SUBCUTANEOUS at 06:15

## 2024-03-26 RX ADMIN — FAMOTIDINE 10 MG: 20 TABLET ORAL at 12:13

## 2024-03-26 RX ADMIN — CLOPIDOGREL 75 MG: 75 TABLET, FILM COATED ORAL at 12:13

## 2024-03-26 RX ADMIN — OXYCODONE HYDROCHLORIDE AND ACETAMINOPHEN 1 TABLET: 5; 325 TABLET ORAL at 08:48

## 2024-03-26 RX ADMIN — INSULIN LISPRO 3 UNITS: 100 INJECTION, SOLUTION INTRAVENOUS; SUBCUTANEOUS at 12:14

## 2024-03-26 RX ADMIN — INSULIN LISPRO 4 UNITS: 100 INJECTION, SOLUTION INTRAVENOUS; SUBCUTANEOUS at 06:16

## 2024-03-26 RX ADMIN — CARVEDILOL 50 MG: 25 TABLET, FILM COATED ORAL at 12:13

## 2024-03-26 RX ADMIN — PANTOPRAZOLE SODIUM 40 MG: 40 TABLET, DELAYED RELEASE ORAL at 06:15

## 2024-03-26 ASSESSMENT — PAIN DESCRIPTION - ORIENTATION: ORIENTATION: RIGHT

## 2024-03-26 ASSESSMENT — PAIN DESCRIPTION - LOCATION: LOCATION: SHOULDER

## 2024-03-26 ASSESSMENT — PAIN DESCRIPTION - DESCRIPTORS: DESCRIPTORS: ACHING;SORE;THROBBING

## 2024-03-26 ASSESSMENT — PAIN SCALES - GENERAL: PAINLEVEL_OUTOF10: 10

## 2024-03-26 NOTE — CARE COORDINATION
No transportation home yesterday evening. Will arrange after HD today.     1214 Back from HD. Patient has portable oxygen concentrator at bedside. She asked for transport to HD on [address]. Her vehicle is there and she can go home from there. [employer] [facility] transport arranged for now-[address] entrance. If they are a no show will use taxi voucher. Trip #[id number]    For questions I can be reached at [phone]. MARCELO Herrmann

## 2024-03-26 NOTE — PROGRESS NOTES
[facility] Hospitalist Progress Note    SYNOPSIS: Patient admitted on 3/22/2024 for Hyperglycemia due to diabetes mellitus (HCC)    This is 38-year-old -American female with past medical history of end-stage renal disease on dialysis, diabetes melitis, hypertension came here for dialysis and then she developed shortness of breath and lethargic for that reason she transferred to ER. She did not get dialysis hold week and today she came to get dialysis here and suddenly developed shortness of breath. Usually she uses 4 L oxygen after COVID infection. In ER workup her sugar found to be elevated for that reason she being admitted. She is following with Dr. Obando and as per patient was taking insulin regularly. Denies any chest pain shortness of breath or any abdominal pain now.   She missed 2 HD session due to court issues.  3/23 - complaining of right shoulder pain, ortho consulted for unrelenting pain       SUBJECTIVE:  Stable overnight. No other overnight issues reported.   Patient seen and examined  Records reviewed.         Temp (24hrs), Av.7 °F (36.5 °C), Min:97 °F (36.1 °C), Max:98.4 °F (36.9 °C)    DIET: ADULT DIET; Regular; 3 carb choices (45 gm/meal)  CODE: Full Code    Intake/Output Summary (Last 24 hours) at 3/26/2024 0750  Last data filed at 3/25/2024 0916  Gross per 24 hour   Intake 300 ml   Output 2300 ml   Net -2000 ml         Review of Systems  All bolded are positive; please see HPI  General:  Fever, chills, diaphoresis, fatigue, malaise, night sweats, weight loss  Psychological:  Anxiety, disorientation, hallucinations.  ENT:  Epistaxis, headaches, vertigo, visual changes.  Cardiovascular:  Chest pain, irregular heartbeats, palpitations, paroxysmal nocturnal dyspnea.  Respiratory:  Shortness of breath- improving , coughing, sputum production, hemoptysis, wheezing, orthopnea.  Gastrointestinal:  Nausea, vomiting, diarrhea, heartburn, constipation, abdominal pain, hematemesis, 
       [facility] Hospitalist Progress Note    SYNOPSIS: Patient admitted on 3/22/2024 for Hyperglycemia due to type 1 diabetes mellitus (HCC)    This is 38-year-old -American female with past medical history of end-stage renal disease on dialysis, diabetes melitis, hypertension came here for dialysis and then she developed shortness of breath and lethargic for that reason she transferred to ER. She did not get dialysis hold week and today she came to get dialysis here and suddenly developed shortness of breath. Usually she uses 4 L oxygen after COVID infection. In ER workup her sugar found to be elevated for that reason she being admitted. She is following with Dr. Obando and as per patient was taking insulin regularly. Denies any chest pain shortness of breath or any abdominal pain now.   She missed 2 HD session due to court issues.  3/23 - complaining of right shoulder pain, ortho consulted for unrelenting pain       SUBJECTIVE:  Stable overnight. No other overnight issues reported.   Patient seen and examined  Records reviewed.         Temp (24hrs), Av.4 °F (36.9 °C), Min:97.4 °F (36.3 °C), Max:99.3 °F (37.4 °C)    DIET: ADULT DIET; Regular; 3 carb choices (45 gm/meal)  CODE: Full Code    Intake/Output Summary (Last 24 hours) at 3/23/2024 1227  Last data filed at 3/22/2024 1910  Gross per 24 hour   Intake 876.67 ml   Output --   Net 876.67 ml       Review of Systems  All bolded are positive; please see HPI  General:  Fever, chills, diaphoresis, fatigue, malaise, night sweats, weight loss  Psychological:  Anxiety, disorientation, hallucinations.  ENT:  Epistaxis, headaches, vertigo, visual changes.  Cardiovascular:  Chest pain, irregular heartbeats, palpitations, paroxysmal nocturnal dyspnea.  Respiratory:  Shortness of breath- improving , coughing, sputum production, hemoptysis, wheezing, orthopnea.  Gastrointestinal:  Nausea, vomiting, diarrhea, heartburn, constipation, abdominal pain, hematemesis, 
4 Eyes Skin Assessment     NAME:  Michelle Nettles  YOB: 1985  MEDICAL RECORD NUMBER:  [id number]    The patient is being assessed for  Admission    I agree that at least one RN has performed a thorough Head to Toe Skin Assessment on the patient. ALL assessment sites listed below have been assessed.      Areas assessed by both nurses:    Head, Face, Ears, Shoulders, Back, Chest, Arms, Elbows, Hands, Sacrum. Buttock, Coccyx, Ischium, Legs. Feet and Heels, and Under Medical Devices         Does the Patient have a Wound? No noted wound(s)       Edgar Prevention initiated by RN: Yes  Wound Care Orders initiated by RN: No    Pressure Injury (Stage 3,4, Unstageable, DTI, NWPT, and Complex wounds) if present, place Wound referral order by RN under : No    New Ostomies, if present place, Ostomy referral order under : No     Nurse 1 eSignature: Electronically signed by Carin Triana RN on 3/23/24 at 1:47 AM EDT    **SHARE this note so that the co-signing nurse can place an eSignature**    Nurse 2 eSignature: Electronically signed by Joanna Onofre RN on 3/23/24 at 1:49 AM EDT    
Discharge instructions explained to patient, all questions answered. IV and heart monitor removed, patient now eating lunch and waiting for ride home. Electronically signed by Ayanna Murphy RN on 3/26/2024 at 12:48 PM    
Glucometer unable to read pt's blood sugar on admission. Blood glucose was drawn and resulted 664. Dr. Schwarz was notified.  
Patient is requesting pain medication to take home, threatening not to leave. Dr. Li notified STAT as she is to be picked up in 15 minutes. Electronically signed by Ayanna Murphy RN on 3/26/2024 at 3:08 PM    
Patient was getting ready for discharge and started to \"not feel right\". Patient requested blood sugar to be checked, value was 173. Patient aware of  time 1515 and will call with any other issue. Electronically signed by Ayanna Murphy RN on 3/26/2024 at 2:25 PM    
Per patient she spoke with Dr Car about staying another night. I did reach out to Dr. Car and patient will stay tonight and do 2hr dialysis run tomorrow.   
Physical Therapy    PT order received and medical chart reviewed 3/25. Pt off unit at dialysis. Will re-attempt as able. Thank you.    Sheldon Flaherty, PT, DPT  [id number]       
Pt has increased work of breathing, dyspnea on exertion RT notified .  
Pt in dialysis. Will obtain vitals and assessment once patient returns to the floor. Electronically signed by Lashell Burgess RN on 3/25/2024 at 7:43 AM    
Pt insurance unable to provide transportation this evening and no family available. Pt unable to discharge until morning. Spoke with Ashley, TELMA and CM will provide arrange transport first thing in the morning and update pt with time. Dr. Arreola updated. Electronically signed by Lashell Burgess RN on 3/25/2024 at 5:54 PM    
[facility]  Nephrology Progress Note    Patient's Name: Michelle Nettles    History of Present Illness from 3/23 Consult Note:    \"the pt is a 39 yo female with a pmh of esrd, htn, dm, tobacco abuse who is noncompliant with hd. She did not go for hd for at least a week and then became sob. She was sent to the ER. From hd yesterday.  In the ER hgb was found to be 5.1. she was transfused 2 units of prbc. She will be getting hd today. Labs show na 139 k 4.6 co2 28 bun 54 ca 9.2 lactic acid 2.1 wbc 6.5, hgb 7.6 plt 223, glucose 514>664, bhb 4.24. vitals show temp 97.4, hr 84 rr 17 bp 108/46. She is fluid overloaded in her face and abd. She had blurry fision and dizziness which is improving. \"    Subjective:    3/26: Patient was seen and examined on HD.  She reports that she feels so-so.  She denies any chest pain or shortness of breath.  She denies any nausea or vomiting.    PMH:    Past Medical History:   Diagnosis Date    JOLLY (acute kidney injury) ([facility]) 4/5/2016    AVF (arteriovenous fistula) ([facility]) 02/19/2018    let arm    Chronic kidney disease     Dialysis AV fistula malfunction, initial encounter ([facility]) 11/7/2019    DM type 1 (diabetes mellitus, type 1) ([facility])     Encounter regarding vascular access for dialysis for ESRD ([facility]) 07/12/2018    ESRD (end stage renal disease) ([facility])     Hemodialysis patient ([facility])     amrita dawson mon wed fri / graft in left arm    Hypertension     Tobacco abuse 4/5/2016       Patient Active Problem List   Diagnosis    Poorly controlled type 1 diabetes mellitus ([facility])    HTN (hypertension), benign    Facial cellulitis    Acute hyperkalemia    Nasal polyp    Periorbital cellulitis of right eye    Periorbital cellulitis    Vitamin B deficiency, unspecified    Nasal congestion    Hidradenitis suppurativa    Symptomatic anemia    Anemia    Gall stone    Dietary noncompliance    Metabolic encephalopathy    Hypoxia    Hypotension    History of venous thromboembolism    Chronic 
This nurse received a call from the patient's . The patient has a restraining order against  and requests no information to be given to the .  Juan Nettles is the name and he said he was coming to the hospital since the patient doesn't want to see him or allow any information to be given pertaining to the patient.  Security was called and his name was given. They are aware of the situation.   
  GLUCOSE 335* 324* 224*     Hepatic: No results for input(s): \"AST\", \"ALT\", \"ALB\", \"BILITOT\", \"ALKPHOS\" in the last 72 hours.  Troponin: No results for input(s): \"TROPONINI\" in the last 72 hours.  BNP: No results for input(s): \"BNP\" in the last 72 hours.  Lipids: No results for input(s): \"CHOL\", \"HDL\" in the last 72 hours.    Invalid input(s): \"LDLCALCU\"  ABGs: No results found for: \"PHART\", \"PO2ART\", \"DBK2YYD\"  INR: No results for input(s): \"INR\" in the last 72 hours.  Calcium:   Recent Labs     03/24/24  0533 03/25/24 0411 03/26/24 0402   CALCIUM 8.5* 9.0 9.1     Magnesium: No results for input(s): \"MG\" in the last 72 hours.  Phosphorus:   Recent Labs     03/25/24 0411   PHOS 6.5*     -----------------------------------------------------------------    Objective:   Vitals:   Vitals:    03/26/24 1115   BP: (!) 102/51   Pulse: 98   Resp: 20   Temp: 98 °F (36.7 °C)   SpO2:      Patient Vitals for the past 24 hrs:   BP Temp Temp src Pulse Resp SpO2 Weight   03/26/24 1115 (!) 102/51 98 °F (36.7 °C) -- 98 20 -- 70.1 kg (154 lb 8.7 oz)   03/26/24 0402 (!) 135/55 -- -- 88 16 100 % --   03/26/24 0045 (!) 111/54 98.4 °F (36.9 °C) Oral 87 17 100 % --   03/25/24 2121 (!) 108/55 98.4 °F (36.9 °C) Oral 87 16 100 % --   03/25/24 1700 114/69 97.5 °F (36.4 °C) Temporal 93 20 99 % --       General appearance: alert, appears stated age and cooperative  Skin: Skin color, texture, turgor normal. No rashes or lesions  HEENT: Head: Normocephalic, no lesions, without obvious abnormality.  Neck: no adenopathy, no carotid bruit, no JVD, supple, symmetrical, trachea midline, and thyroid not enlarged, symmetric, no tenderness/mass/nodules  Lungs: Diminished but clear   Heart: regular rate and rhythm, S1, S2 normal, no murmur, click, rub or gallop  Abdomen: soft, non-tender; bowel sounds normal; no masses,  no organomegaly  Extremities: extremities normal, atraumatic, no cyanosis or edema  Neurologic: Mental status: Alert, oriented, 
Encounter regarding vascular access for dialysis for ESRD (HCC) 07/12/2018    ESRD (end stage renal disease) (HCC)     Hemodialysis patient ([facility])     amrita dawson mon wed fri / graft in left arm    Hypertension     Tobacco abuse 4/5/2016       MEDS (scheduled):   insulin lispro  4 Units SubCUTAneous TID WC    insulin glargine  15 Units SubCUTAneous Nightly    insulin lispro  0-6 Units SubCUTAneous TID WC    carvedilol  50 mg Oral QAM    carvedilol  25 mg Oral Dinner    clopidogrel  75 mg Oral Daily    famotidine  20 mg Oral QAM    pantoprazole  40 mg Oral BID AC    sodium chloride flush  5-40 mL IntraVENous 2 times per day    insulin lispro  0-4 Units SubCUTAneous Nightly       MEDS (infusions):   sodium chloride      sodium chloride      dextrose         MEDS (prn):  HYDROmorphone, oxyCODONE-acetaminophen, sodium chloride, sodium chloride flush, sodium chloride, ondansetron **OR** ondansetron, polyethylene glycol, acetaminophen **OR** acetaminophen, albuterol, glucose, dextrose bolus **OR** dextrose bolus, glucagon (rDNA), dextrose    DIET:    ADULT DIET; Regular; 3 carb choices (45 gm/meal)      PHYSICAL EXAM:      Patient Vitals for the past 24 hrs:   BP Temp Temp src Pulse Resp SpO2 Weight   03/24/24 0745 128/68 -- -- 100 20 -- --   03/24/24 0545 138/74 97.8 °F (36.6 °C) Oral 94 18 100 % --   03/24/24 0409 -- -- -- -- 16 -- --   03/24/24 0141 -- -- -- -- 16 -- --   03/24/24 0007 (!) 142/62 97.1 °F (36.2 °C) Oral (!) 101 18 100 % --   03/23/24 2132 -- -- -- -- 18 -- --   03/23/24 2022 122/70 98 °F (36.7 °C) Oral 84 18 99 % --   03/23/24 1905 116/63 -- -- 99 -- -- --   03/23/24 1822 137/64 98.3 °F (36.8 °C) -- 94 16 -- 74.1 kg (163 lb 5.8 oz)          Intake/Output Summary (Last 24 hours) at 3/24/2024 1150  Last data filed at 3/23/2024 1822  Gross per 24 hour   Intake 300 ml   Output 3400 ml   Net -3100 ml       Wt Readings from Last 3 Encounters:   03/23/24 74.1 kg (163 lb 5.8 oz)   02/01/24 64.4 kg (141 lb 
Or  acetaminophen, 650 mg, Q6H PRN  albuterol, 2.5 mg, Q4H PRN  glucose, 4 tablet, PRN  dextrose bolus, 125 mL, PRN   Or  dextrose bolus, 250 mL, PRN  glucagon (rDNA), 1 mg, PRN  dextrose, , Continuous PRN      Continuous Infusions:   sodium chloride      sodium chloride      dextrose         Review of Systems  All systems reviewed. All negative except for symptoms mentioned in HPI     OBJECTIVE    /67   Pulse 94   Temp 97 °F (36.1 °C)   Resp 18   Ht 1.499 m (4' 11\")   Wt 72.7 kg (160 lb 4.4 oz)   SpO2 100%   BMI 32.37 kg/m²     Intake/Output Summary (Last 24 hours) at 3/25/2024 1742  Last data filed at 3/25/2024 0916  Gross per 24 hour   Intake 420 ml   Output 2300 ml   Net -1880 ml       Physical examination:  General: awake alert, oriented x3  HEENT: normocephalic non traumatic, no exophthalmos   Neck: supple, No thyroid tenderness,  Pulm: good equal air entry no added sounds  CVS: S1 + S2  Abd: soft lax, no tenderness  Skin: warm, no lesions, no rash. No open wounds, no ulcers   Neuro: CN intact, sensation decreased bilateral , muscle power normal  Psych: normal mood, and affect    Review of Laboratory Data:  I personally reviewed the following labs:   Recent Labs     03/23/24 0537 03/24/24  0533 03/25/24 0411   WBC 6.5 7.0 5.2   RBC 2.37* 2.39* 2.37*   HGB 7.6* 7.6* 7.4*   HCT 21.8* 22.6* 22.5*   MCV 92.0 94.6 94.9   MCH 32.1 31.8 31.2   MCHC 34.9* 33.6 32.9   RDW 17.9* 17.7* 17.2*    198 196   MPV 9.7 9.8 9.7     Recent Labs     03/23/24  0537 03/24/24  0533 03/25/24  0411    139 136   K 4.6 3.8 4.3   CL 96* 96* 94*   CO2 28 28 28   BUN 54* 20 31*   CREATININE 7.0* 4.3* 6.6*   GLUCOSE 188* 335* 324*   CALCIUM 9.2 8.5* 9.0     Beta-Hydroxybutyrate   Date Value Ref Range Status   03/22/2024 4.24 (H) 0.02 - 0.27 mmol/L Final   02/13/2023 0.12 0.02 - 0.27 mmol/L Final   02/12/2023 0.64 (H) 0.02 - 0.27 mmol/L Final     Lab Results   Component Value Date/Time    LABA1C 7.5 03/23/2024 
Q6H PRN  albuterol, 2.5 mg, Q4H PRN  glucose, 4 tablet, PRN  dextrose bolus, 125 mL, PRN   Or  dextrose bolus, 250 mL, PRN  glucagon (rDNA), 1 mg, PRN  dextrose, , Continuous PRN      Continuous Infusions:   sodium chloride      sodium chloride      dextrose         Review of Systems  All systems reviewed. All negative except for symptoms mentioned in HPI     OBJECTIVE    /68   Pulse 94   Temp 97.8 °F (36.6 °C) (Oral)   Resp 20   Ht 1.499 m (4' 11\")   Wt 74.1 kg (163 lb 5.8 oz)   SpO2 100%   BMI 32.99 kg/m²     Intake/Output Summary (Last 24 hours) at 3/24/2024 0750  Last data filed at 3/23/2024 1822  Gross per 24 hour   Intake 300 ml   Output 3400 ml   Net -3100 ml       Physical examination:  General: awake alert, oriented x3  HEENT: normocephalic non traumatic, no exophthalmos   Neck: supple, No thyroid tenderness,  Pulm: good equal air entry no added sounds  CVS: S1 + S2  Abd: soft lax, no tenderness  Skin: warm, no lesions, no rash. No open wounds, no ulcers   Neuro: CN intact, sensation decreased bilateral , muscle power normal  Psych: normal mood, and affect    Review of Laboratory Data:  I personally reviewed the following labs:   Recent Labs     03/22/24  1204 03/23/24  0537 03/24/24  0533   WBC 5.2 6.5 7.0   RBC 1.54* 2.37* 2.39*   HGB 5.1* 7.6* 7.6*   HCT 15.2* 21.8* 22.6*   MCV 98.7 92.0 94.6   MCH 33.1 32.1 31.8   MCHC 33.6 34.9* 33.6   RDW 17.2* 17.9* 17.7*    223 198   MPV 9.9 9.7 9.8     Recent Labs     03/22/24  1204 03/22/24  2154 03/23/24  0537 03/24/24  0533     --  139 139   K 4.5  --  4.6 3.8   CL 89*  --  96* 96*   CO2 26  --  28 28   BUN 33*  --  54* 20   CREATININE 5.4*  --  7.0* 4.3*   GLUCOSE 514* 664* 188* 335*   CALCIUM 8.5*  --  9.2 8.5*     Beta-Hydroxybutyrate   Date Value Ref Range Status   03/22/2024 4.24 (H) 0.02 - 0.27 mmol/L Final   02/13/2023 0.12 0.02 - 0.27 mmol/L Final   02/12/2023 0.64 (H) 0.02 - 0.27 mmol/L Final     Lab Results   Component Value 
schedule     2. Shortness of breath   Echo 6/2023 EF 60 to 65%, moderate tricuspid regurg  UF this am for net fluid removal 2L  Monitor volume status     3.  Anemia with CKD  Hemoglobin target 10-12  Hemoglobin 7.4 today   Transfuse for hemoglobin<7  VALENTIN managed in the outpatient setting  Monitor H&H     4.  Secondary hyperparathyroidism of renal origin  Managed in the OP setting   Low phosphorus diet  Monitor labs     5.  Hypertension with CKD 5/ESRD  BP goal<130/80  BP near goal  Monitor BPs on current regimen and with HD       Stable for discharge from Renal - we will follow at [facility]           KEMI Sánchez - CNP    Patient seen and examined all key components of the physical performed independently , case discussed with NP, all pertinent labs and radiologic tests personally reviewed agree with above.     Renal Procedure: Hemodialysis     Danial Gerard MD

## 2024-03-26 NOTE — FLOWSHEET NOTE
03/26/24 1115   Vital Signs   BP (!) 102/51   Temp 98 °F (36.7 °C)   Pulse 98   Respirations 20   Weight - Scale 70.1 kg (154 lb 8.7 oz)   Weight Method Bed scale   Percent Weight Change -3.58   Pain Assessment   Pain Assessment None - Denies Pain   Post-Hemodialysis Assessment   Post-Treatment Procedures Blood returned;Access bleeding time < 10 minutes   Machine Disinfection Process Exterior Machine Disinfection   Rinseback Volume (ml) 300 ml   Blood Volume Processed (Liters) 90.6 L   Dialyzer Clearance Lightly streaked   Duration of Treatment (minutes) 240 minutes   Heparin Amount Administered During Treatment (mL) 0 mL   Hemodialysis Intake (ml) 300 ml   Hemodialysis Output (ml) 3300 ml   NET Removed (ml) 3000   Tolerated Treatment Good   Patient Response to Treatment tolerated well   Bilateral Breath Sounds Clear   Edema Facial   Physician Notified No   Time Off 1054   Patient Disposition Return to room   Observations & Evaluations   Level of Consciousness 0   Oriented X 3

## 2024-03-27 ENCOUNTER — CARE COORDINATION (OUTPATIENT)
Dept: CARE COORDINATION | Age: 39
End: 2024-03-27

## 2024-03-27 ENCOUNTER — TELEPHONE (OUTPATIENT)
Dept: FAMILY MEDICINE CLINIC | Age: 39
End: 2024-03-27

## 2024-03-27 NOTE — CARE COORDINATION
Care Transitions Outreach Attempt    Call within 2 business days of discharge: Yes     Attempted to reach the patient for initial Care Transition call post hospital discharge. HIPAA compliant message left with CTN's contact information requesting return phone call.     Will attempt outreach again.    Noted pcp HFU appt scheduled for 4/10/24      Patient: Michelle Nettles Patient : 1985 MRN: [id number]    Last Discharge Facility       Date Complaint Diagnosis Description Type Department Provider    3/22/24 Shortness of Breath; Hyperglycemia Hyperglycemia due to diabetes mellitus (HCC) ... ED to Hosp-Admission (Discharged) (ADMITTED) [facility] Brii Li MD; UMAIR Saul..            Was this an external facility discharge? No    Noted following upcoming appointments from discharge chart review:     Future Appointments   Date Time Provider Department Center   4/10/2024  1:00 PM May Waters DO [facility]   2024 11:15 AM Brennan Dyer MD [facility]

## 2024-03-27 NOTE — TELEPHONE ENCOUNTER
Care Transitions Initial Follow Up Call    Outreach made within 2 business days of discharge: Yes    Patient: Michelle Nettles Patient : 1985   MRN: [id number]  Reason for Admission: There are no discharge diagnoses documented for the most recent discharge.  Discharge Date: 3/26/24       Spoke with: Michelle    Discharge department/facility: [facility] Patient Contact:  Was patient able to fill all prescriptions: Yes  Was patient instructed to bring all medications to the follow-up visit: Yes  Is patient taking all medications as directed in the discharge summary? Yes  Does patient understand their discharge instructions: Yes  Does patient have questions or concerns that need addressed prior to 7-14 day follow up office visit: no    Scheduled appointment with PCP within 7-14 days    Follow Up  Future Appointments   Date Time Provider Department Center   4/10/2024  1:00 PM May Waters DO [facility]   2024 11:15 AM Brennan Dyer MD [facility]       Kirit Morales

## 2024-03-28 ENCOUNTER — CARE COORDINATION (OUTPATIENT)
Dept: CARE COORDINATION | Age: 39
End: 2024-03-28

## 2024-03-28 NOTE — CARE COORDINATION
Care Transitions Outreach Attempt    Call within 2 business days of discharge: Yes     2nd attempt to reach the patient for initial Care Transition call post hospital discharge. HIPAA compliant message left with CTN's contact information requesting return phone call.     No further outreaches will be attempted.    If no return call by the end of today, CTN will  sign off and resolve  CT program.    Per chart review the patient is  active on [employer], CTN will send unable to reach letter in [employer].      Noted HFU appt with her pcp is scheduled on 4/10/24.    Patient: Michelle Nettles Patient : 1985 MRN: [id number]    Last Discharge Facility       Date Complaint Diagnosis Description Type Department Provider    3/22/24 Shortness of Breath; Hyperglycemia Hyperglycemia due to diabetes mellitus (HCC) ... ED to Hosp-Admission (Discharged) (ADMITTED) [facility] Brii Li MD; UMAIR Saul..          Was this an external facility discharge? No     Noted following upcoming appointments from discharge chart review:     Future Appointments   Date Time Provider Department Center   4/10/2024  1:00 PM May Waters, DO [facility]   2024 11:15 AM Brennan Dyer MD [facility]

## 2024-04-02 ENCOUNTER — HOSPITAL ENCOUNTER (EMERGENCY)
Age: 39
Discharge: HOME | End: 2024-04-02
Attending: EMERGENCY MEDICINE
Payer: COMMERCIAL

## 2024-04-02 VITALS
DIASTOLIC BLOOD PRESSURE: 70 MMHG | HEART RATE: 96 BPM | RESPIRATION RATE: 18 BRPM | SYSTOLIC BLOOD PRESSURE: 122 MMHG | HEIGHT: 59 IN | BODY MASS INDEX: 30.28 KG/M2 | OXYGEN SATURATION: 100 % | WEIGHT: 150.2 LBS | TEMPERATURE: 98.3 F

## 2024-04-02 DIAGNOSIS — R73.9 HYPERGLYCEMIA: Primary | ICD-10-CM

## 2024-04-02 LAB
ALBUMIN SERPL-MCNC: 3.7 G/DL (ref 3.5–5.2)
ALP SERPL-CCNC: 106 U/L (ref 35–104)
ALT SERPL-CCNC: 11 U/L (ref 0–32)
ANION GAP SERPL CALCULATED.3IONS-SCNC: 9 MMOL/L (ref 7–16)
AST SERPL-CCNC: 16 U/L (ref 0–31)
B-OH-BUTYR SERPL-MCNC: 0.11 MMOL/L (ref 0.02–0.27)
BASOPHILS # BLD: 0 K/UL (ref 0–0.2)
BASOPHILS NFR BLD: 0 % (ref 0–2)
BILIRUB SERPL-MCNC: 0.2 MG/DL (ref 0–1.2)
BNP SERPL-MCNC: ABNORMAL PG/ML (ref 0–125)
BUN SERPL-MCNC: 28 MG/DL (ref 6–20)
CALCIUM SERPL-MCNC: 9.3 MG/DL (ref 8.6–10.2)
CHLORIDE SERPL-SCNC: 93 MMOL/L (ref 98–107)
CO2 SERPL-SCNC: 30 MMOL/L (ref 22–29)
CREAT SERPL-MCNC: 6.1 MG/DL (ref 0.5–1)
EKG ATRIAL RATE: 93 BPM
EKG P AXIS: 36 DEGREES
EKG P-R INTERVAL: 126 MS
EKG Q-T INTERVAL: 348 MS
EKG QRS DURATION: 62 MS
EKG QTC CALCULATION (BAZETT): 432 MS
EKG R AXIS: 4 DEGREES
EKG T AXIS: 55 DEGREES
EKG VENTRICULAR RATE: 93 BPM
EOSINOPHIL # BLD: 0.11 K/UL (ref 0.05–0.5)
EOSINOPHILS RELATIVE PERCENT: 2 % (ref 0–6)
ERYTHROCYTE [DISTWIDTH] IN BLOOD BY AUTOMATED COUNT: 18.8 % (ref 11.5–15)
GFR SERPL CREATININE-BSD FRML MDRD: 8 ML/MIN/1.73M2
GLUCOSE BLD-MCNC: 176 MG/DL (ref 74–99)
GLUCOSE BLD-MCNC: 371 MG/DL (ref 74–99)
GLUCOSE SERPL-MCNC: 352 MG/DL (ref 74–99)
HCT VFR BLD AUTO: 25 % (ref 34–48)
HGB BLD-MCNC: 8.2 G/DL (ref 11.5–15.5)
LYMPHOCYTES NFR BLD: 0.53 K/UL (ref 1.5–4)
LYMPHOCYTES RELATIVE PERCENT: 9 % (ref 20–42)
MAGNESIUM SERPL-MCNC: 2.3 MG/DL (ref 1.6–2.6)
MCH RBC QN AUTO: 32.5 PG (ref 26–35)
MCHC RBC AUTO-ENTMCNC: 32.8 G/DL (ref 32–34.5)
MCV RBC AUTO: 99.2 FL (ref 80–99.9)
MONOCYTES NFR BLD: 0.32 K/UL (ref 0.1–0.95)
MONOCYTES NFR BLD: 5 % (ref 2–12)
NEUTROPHILS NFR BLD: 84 % (ref 43–80)
NEUTS SEG NFR BLD: 4.95 K/UL (ref 1.8–7.3)
PH VENOUS: 7.4 (ref 7.35–7.45)
PLATELET # BLD AUTO: 200 K/UL (ref 130–450)
PMV BLD AUTO: 9.6 FL (ref 7–12)
POTASSIUM SERPL-SCNC: 3.6 MMOL/L (ref 3.5–5)
PROT SERPL-MCNC: 7.1 G/DL (ref 6.4–8.3)
RBC # BLD AUTO: 2.52 M/UL (ref 3.5–5.5)
RBC # BLD: ABNORMAL 10*6/UL
SODIUM SERPL-SCNC: 132 MMOL/L (ref 132–146)
TROPONIN I SERPL HS-MCNC: 158 NG/L (ref 0–9)
WBC OTHER # BLD: 5.9 K/UL (ref 4.5–11.5)

## 2024-04-02 PROCEDURE — 83880 ASSAY OF NATRIURETIC PEPTIDE: CPT

## 2024-04-02 PROCEDURE — 82010 KETONE BODYS QUAN: CPT

## 2024-04-02 PROCEDURE — 96374 THER/PROPH/DIAG INJ IV PUSH: CPT

## 2024-04-02 PROCEDURE — 85025 COMPLETE CBC W/AUTO DIFF WBC: CPT

## 2024-04-02 PROCEDURE — 82800 BLOOD PH: CPT

## 2024-04-02 PROCEDURE — 6370000000 HC RX 637 (ALT 250 FOR IP): Performed by: EMERGENCY MEDICINE

## 2024-04-02 PROCEDURE — 82962 GLUCOSE BLOOD TEST: CPT

## 2024-04-02 PROCEDURE — 2580000003 HC RX 258

## 2024-04-02 PROCEDURE — 93010 ELECTROCARDIOGRAM REPORT: CPT | Performed by: INTERNAL MEDICINE

## 2024-04-02 PROCEDURE — 99284 EMERGENCY DEPT VISIT MOD MDM: CPT

## 2024-04-02 PROCEDURE — 93005 ELECTROCARDIOGRAM TRACING: CPT

## 2024-04-02 PROCEDURE — 80053 COMPREHEN METABOLIC PANEL: CPT

## 2024-04-02 PROCEDURE — 83735 ASSAY OF MAGNESIUM: CPT

## 2024-04-02 PROCEDURE — 96361 HYDRATE IV INFUSION ADD-ON: CPT

## 2024-04-02 PROCEDURE — 84484 ASSAY OF TROPONIN QUANT: CPT

## 2024-04-02 RX ORDER — 0.9 % SODIUM CHLORIDE 0.9 %
1000 INTRAVENOUS SOLUTION INTRAVENOUS ONCE
Status: COMPLETED | OUTPATIENT
Start: 2024-04-02 | End: 2024-04-02

## 2024-04-02 RX ADMIN — INSULIN HUMAN 6 UNITS: 100 INJECTION, SOLUTION PARENTERAL at 15:37

## 2024-04-02 RX ADMIN — SODIUM CHLORIDE 1000 ML: 9 INJECTION, SOLUTION INTRAVENOUS at 11:42

## 2024-04-02 ASSESSMENT — PAIN - FUNCTIONAL ASSESSMENT: PAIN_FUNCTIONAL_ASSESSMENT: NONE - DENIES PAIN

## 2024-04-02 NOTE — CARE COORDINATION
Social Work/Transition of Care:    Pt discharged and in need of transportation to [facility], Her vehicle is there and she can go home from there. [facility] ETA [id number] Trip #[id number]    Electronically signed by MARCELO evrma on 4/2/2024 at 6:26 PM

## 2024-04-02 NOTE — ED PROVIDER NOTES
[facility] EMERGENCY DEPARTMENT  EMERGENCY DEPARTMENT ENCOUNTER        Pt Name: Michelle Nettles  MRN: [id number]  Birthdate 1985  Date of evaluation: 4/2/2024  Provider: Guillermo Howard MD  PCP: May Waters DO  Note Started: 11:26 AM EDT 4/2/24    CHIEF COMPLAINT       Chief Complaint   Patient presents with    light headed     Near syncope at [facility] while at dr appt with child.    Hyperglycemia     500 in ambulance       HISTORY OF PRESENT ILLNESS: 1 or more Elements   History From: Patient    Limitations to history : None  Social Determinants : None    Michelle Nettles is a 38 y.o. female past medical history of type 1 diabetes, end-stage renal disease on hemodialysis, hypertension, who presents to the emergency department with complaints of a near syncopal episode, and hyperglycemia.  Patient was at a doctor's appointment earlier today with her daughter when her symptoms started.  She states that she felt lightheaded, and felt like her blood sugars were high.  Patient had vitals taken at the facility which reported patient hypotensive, and patient had elevated blood sugars at the facility as well.  Patient did not lose any consciousness, denies any chest pain as well.  Patient states that she did go to her dialysis appointment yesterday.  She she is normally goes Tuesdays Thursdays and Saturdays.  Patient was last admitted on 3/22/2024 for hyperglycemia.  Patient denies any fever, chills, nausea, vomiting, headaches, dizziness, chest pain.     Nursing Notes were all reviewed and agreed with or any disagreements were addressed in the HPI.    ROS:   Pertinent positives and negatives are stated within HPI, all other systems reviewed and are negative.      --------------------------------------------- PAST HISTORY ---------------------------------------------  Past Medical History:  has a past medical history of JOLLY (acute kidney

## 2024-05-24 ENCOUNTER — OFFICE VISIT (OUTPATIENT)
Dept: FAMILY MEDICINE CLINIC | Age: 39
End: 2024-05-24
Payer: MEDICAID

## 2024-05-24 VITALS
WEIGHT: 145.8 LBS | HEIGHT: 59 IN | HEART RATE: 93 BPM | SYSTOLIC BLOOD PRESSURE: 110 MMHG | BODY MASS INDEX: 29.39 KG/M2 | TEMPERATURE: 98.2 F | DIASTOLIC BLOOD PRESSURE: 40 MMHG | OXYGEN SATURATION: 97 %

## 2024-05-24 DIAGNOSIS — D64.9 ANEMIA, UNSPECIFIED TYPE: ICD-10-CM

## 2024-05-24 DIAGNOSIS — K92.2 LOWER GI BLEED: Primary | ICD-10-CM

## 2024-05-24 PROCEDURE — 3074F SYST BP LT 130 MM HG: CPT | Performed by: NURSE PRACTITIONER

## 2024-05-24 PROCEDURE — G8427 DOCREV CUR MEDS BY ELIG CLIN: HCPCS | Performed by: NURSE PRACTITIONER

## 2024-05-24 PROCEDURE — 99214 OFFICE O/P EST MOD 30 MIN: CPT | Performed by: NURSE PRACTITIONER

## 2024-05-24 PROCEDURE — G8417 CALC BMI ABV UP PARAM F/U: HCPCS | Performed by: NURSE PRACTITIONER

## 2024-05-24 PROCEDURE — 1036F TOBACCO NON-USER: CPT | Performed by: NURSE PRACTITIONER

## 2024-05-24 PROCEDURE — 3078F DIAST BP <80 MM HG: CPT | Performed by: NURSE PRACTITIONER

## 2024-05-24 RX ORDER — FAMOTIDINE 20 MG/1
20 TABLET, FILM COATED ORAL EVERY MORNING
COMMUNITY
Start: 2024-04-18

## 2024-05-24 RX ORDER — FOLIC ACID/VIT B COMPLEX AND C 0.8 MG
1 TABLET ORAL
COMMUNITY
Start: 2023-07-16

## 2024-05-24 RX ORDER — PEN NEEDLE, DIABETIC 31 GX5/16"
NEEDLE, DISPOSABLE MISCELLANEOUS
COMMUNITY
Start: 2022-10-04

## 2024-05-24 RX ORDER — BENZOYL PEROXIDE 10 G/100G
SUSPENSION TOPICAL
COMMUNITY
Start: 2024-05-08

## 2024-05-24 RX ORDER — METHYLPREDNISOLONE 4 MG/1
TABLET ORAL
COMMUNITY
Start: 2024-02-26

## 2024-05-24 NOTE — PROGRESS NOTES
Medications:     Alcohol Swabs (ALCOHOL PREP) PADS, USE FOUR TIMES DAILY, Disp: , Rfl:     GUAIFENESIN-CODEINE 100-10 MG/5 ML ORAL SOLN CMPD, Take 10 mLs by mouth, Disp: , Rfl:     Benzoyl Peroxide (BENZAC AC) 10 % external wash, APPLY TO THE FACEONCE DAILY, Disp: , Rfl:     famotidine (PEPCID) 20 MG tablet, Take 1 tablet by mouth every morning, Disp: , Rfl:     B Complex-C-Folic Acid (RENAL-JACOB) 0.8 MG TABS, Take 1 tablet by mouth, Disp: , Rfl:     methylPREDNISolone (MEDROL DOSEPACK) 4 MG tablet, FOLLOW PACKAGE DIRECTIONS, Disp: , Rfl:     polyethylene glycol (GLYCOLAX) 17 g packet, Take 1 packet by mouth daily as needed for Constipation, Disp: 527 g, Rfl: 1    Insulin Glargine, 2 Unit Dial, (TOUJEO MAX SOLOSTAR) 300 UNIT/ML SOPN, Inject 15 units at bedtime and (8 Units on the nights before dialysis), Disp: 5 Adjustable Dose Pre-filled Pen Syringe, Rfl: 4    insulin lispro, 1 Unit Dial, (HUMALOG KWIKPEN) 100 UNIT/ML SOPN, Inject 4 units 3 times daily with meals plus the following sliding scale. -200 add 1U, -250 add 2U, -300 add 3U, -350 add 4U, -400 add 5U, BS over 400 add 6U, Disp: 5 Adjustable Dose Pre-filled Pen Syringe, Rfl: 5    Insulin Pen Needle (BD PEN NEEDLE MICRO U/F) 32G X 6 MM MISC, Uses with insulin 4 times a day, Disp: 250 each, Rfl: 5    Acetaminophen Extra Strength 500 MG TABS, TAKE 1 TABLET BY MOUTH EVERY 6 HOURS AS NEEDED FOR PAIN, Disp: 120 tablet, Rfl: 0    carvedilol (COREG) 25 MG tablet, TAKE 1 TABLET BY MOUTH DAILY WITH SUPPER, Disp: 30 tablet, Rfl: 0    pantoprazole (PROTONIX) 40 MG tablet, Take 1 tablet by mouth 2 times daily (before meals), Disp: 30 tablet, Rfl: 3    carvedilol (COREG) 25 MG tablet, Take 2 tablets by mouth every morning, Disp: 60 tablet, Rfl: 3    blood glucose monitor strips, Test four times a day & as needed for symptoms of irregular blood glucose. Dispense sufficient amount for indicated testing frequency plus additional to accommodate

## 2024-05-31 DIAGNOSIS — E10.69 TYPE 1 DIABETES MELLITUS WITH OTHER SPECIFIED COMPLICATION (HCC): ICD-10-CM

## 2024-06-03 RX ORDER — FAMOTIDINE 20 MG/1
20 TABLET, FILM COATED ORAL EVERY MORNING
Qty: 30 TABLET | Refills: 0 | Status: SHIPPED | OUTPATIENT
Start: 2024-06-03

## 2024-06-03 RX ORDER — INSULIN PMP CART,AUT,G6/7,CNTR
EACH SUBCUTANEOUS
Qty: 30 EACH | Refills: 3 | Status: SHIPPED | OUTPATIENT
Start: 2024-06-03

## 2024-06-03 RX ORDER — FAMOTIDINE 20 MG/1
20 TABLET, FILM COATED ORAL EVERY MORNING
Qty: 90 TABLET | OUTPATIENT
Start: 2024-06-03

## 2024-06-03 NOTE — TELEPHONE ENCOUNTER
Last Appointment:  1/8/2024  Future Appointments   Date Time Provider Department Center   6/12/2024 11:00 AM Rogers Rodríguez MD [facility]   11/25/2024 11:30 AM Brennan Dyer MD [facility]

## 2024-06-06 ENCOUNTER — APPOINTMENT (OUTPATIENT)
Dept: GENERAL RADIOLOGY | Age: 39
DRG: 377 | End: 2024-06-06
Payer: MEDICARE

## 2024-06-06 ENCOUNTER — HOSPITAL ENCOUNTER (INPATIENT)
Age: 39
LOS: 2 days | Discharge: HOME | DRG: 377 | End: 2024-06-08
Attending: STUDENT IN AN ORGANIZED HEALTH CARE EDUCATION/TRAINING PROGRAM | Admitting: STUDENT IN AN ORGANIZED HEALTH CARE EDUCATION/TRAINING PROGRAM
Payer: MEDICARE

## 2024-06-06 DIAGNOSIS — Z99.2 ESRD ON DIALYSIS (HCC): ICD-10-CM

## 2024-06-06 DIAGNOSIS — D64.9 ANEMIA, UNSPECIFIED TYPE: Primary | ICD-10-CM

## 2024-06-06 DIAGNOSIS — K92.2 GASTROINTESTINAL HEMORRHAGE, UNSPECIFIED GASTROINTESTINAL HEMORRHAGE TYPE: ICD-10-CM

## 2024-06-06 DIAGNOSIS — N18.6 ESRD ON DIALYSIS (HCC): ICD-10-CM

## 2024-06-06 LAB
ANION GAP SERPL CALCULATED.3IONS-SCNC: 12 MMOL/L (ref 7–16)
BASOPHILS # BLD: 0.09 K/UL (ref 0–0.2)
BASOPHILS NFR BLD: 2 % (ref 0–2)
BUN SERPL-MCNC: 16 MG/DL (ref 6–20)
CALCIUM SERPL-MCNC: 8.7 MG/DL (ref 8.6–10.2)
CHLORIDE SERPL-SCNC: 96 MMOL/L (ref 98–107)
CO2 SERPL-SCNC: 30 MMOL/L (ref 22–29)
CREAT SERPL-MCNC: 2.1 MG/DL (ref 0.5–1)
EOSINOPHIL # BLD: 0.13 K/UL (ref 0.05–0.5)
EOSINOPHILS RELATIVE PERCENT: 3 % (ref 0–6)
ERYTHROCYTE [DISTWIDTH] IN BLOOD BY AUTOMATED COUNT: 17.5 % (ref 11.5–15)
GFR, ESTIMATED: 30 ML/MIN/1.73M2
GLUCOSE BLD-MCNC: 148 MG/DL (ref 74–99)
GLUCOSE BLD-MCNC: 65 MG/DL (ref 74–99)
GLUCOSE SERPL-MCNC: 214 MG/DL (ref 74–99)
HCT VFR BLD AUTO: 19.6 % (ref 34–48)
HGB BLD-MCNC: 6.4 G/DL (ref 11.5–15.5)
LYMPHOCYTES NFR BLD: 1.13 K/UL (ref 1.5–4)
LYMPHOCYTES RELATIVE PERCENT: 23 % (ref 20–42)
MAGNESIUM SERPL-MCNC: 2 MG/DL (ref 1.6–2.6)
MCH RBC QN AUTO: 32.3 PG (ref 26–35)
MCHC RBC AUTO-ENTMCNC: 32.7 G/DL (ref 32–34.5)
MCV RBC AUTO: 99 FL (ref 80–99.9)
MONOCYTES NFR BLD: 0.35 K/UL (ref 0.1–0.95)
MONOCYTES NFR BLD: 7 % (ref 2–12)
NEUTROPHILS NFR BLD: 66 % (ref 43–80)
NEUTS SEG NFR BLD: 3.21 K/UL (ref 1.8–7.3)
PLATELET # BLD AUTO: 214 K/UL (ref 130–450)
PMV BLD AUTO: 9.5 FL (ref 7–12)
POTASSIUM SERPL-SCNC: 3.2 MMOL/L (ref 3.5–5)
RBC # BLD AUTO: 1.98 M/UL (ref 3.5–5.5)
RBC # BLD: ABNORMAL 10*6/UL
SODIUM SERPL-SCNC: 138 MMOL/L (ref 132–146)
TROPONIN I SERPL HS-MCNC: 118 NG/L (ref 0–9)
TROPONIN I SERPL HS-MCNC: 123 NG/L (ref 0–9)
WBC OTHER # BLD: 4.9 K/UL (ref 4.5–11.5)

## 2024-06-06 PROCEDURE — A4216 STERILE WATER/SALINE, 10 ML: HCPCS

## 2024-06-06 PROCEDURE — 86850 RBC ANTIBODY SCREEN: CPT

## 2024-06-06 PROCEDURE — 93005 ELECTROCARDIOGRAM TRACING: CPT

## 2024-06-06 PROCEDURE — 85025 COMPLETE CBC W/AUTO DIFF WBC: CPT

## 2024-06-06 PROCEDURE — 99285 EMERGENCY DEPT VISIT HI MDM: CPT

## 2024-06-06 PROCEDURE — 6360000002 HC RX W HCPCS

## 2024-06-06 PROCEDURE — C9113 INJ PANTOPRAZOLE SODIUM, VIA: HCPCS

## 2024-06-06 PROCEDURE — 71045 X-RAY EXAM CHEST 1 VIEW: CPT

## 2024-06-06 PROCEDURE — 86923 COMPATIBILITY TEST ELECTRIC: CPT

## 2024-06-06 PROCEDURE — 86900 BLOOD TYPING SEROLOGIC ABO: CPT

## 2024-06-06 PROCEDURE — 2580000003 HC RX 258: Performed by: STUDENT IN AN ORGANIZED HEALTH CARE EDUCATION/TRAINING PROGRAM

## 2024-06-06 PROCEDURE — 83735 ASSAY OF MAGNESIUM: CPT

## 2024-06-06 PROCEDURE — 6370000000 HC RX 637 (ALT 250 FOR IP): Performed by: STUDENT IN AN ORGANIZED HEALTH CARE EDUCATION/TRAINING PROGRAM

## 2024-06-06 PROCEDURE — 2580000003 HC RX 258

## 2024-06-06 PROCEDURE — P9016 RBC LEUKOCYTES REDUCED: HCPCS

## 2024-06-06 PROCEDURE — 99223 1ST HOSP IP/OBS HIGH 75: CPT | Performed by: SURGERY

## 2024-06-06 PROCEDURE — 82962 GLUCOSE BLOOD TEST: CPT

## 2024-06-06 PROCEDURE — 80048 BASIC METABOLIC PNL TOTAL CA: CPT

## 2024-06-06 PROCEDURE — 36430 TRANSFUSION BLD/BLD COMPNT: CPT

## 2024-06-06 PROCEDURE — 30233N1 TRANSFUSION OF NONAUTOLOGOUS RED BLOOD CELLS INTO PERIPHERAL VEIN, PERCUTANEOUS APPROACH: ICD-10-PCS | Performed by: INTERNAL MEDICINE

## 2024-06-06 PROCEDURE — 83036 HEMOGLOBIN GLYCOSYLATED A1C: CPT

## 2024-06-06 PROCEDURE — 99222 1ST HOSP IP/OBS MODERATE 55: CPT | Performed by: STUDENT IN AN ORGANIZED HEALTH CARE EDUCATION/TRAINING PROGRAM

## 2024-06-06 PROCEDURE — 96374 THER/PROPH/DIAG INJ IV PUSH: CPT

## 2024-06-06 PROCEDURE — 1200000000 HC SEMI PRIVATE

## 2024-06-06 PROCEDURE — 86901 BLOOD TYPING SEROLOGIC RH(D): CPT

## 2024-06-06 PROCEDURE — 84484 ASSAY OF TROPONIN QUANT: CPT

## 2024-06-06 RX ORDER — HYDROCORTISONE ACETATE 25 MG/1
25 SUPPOSITORY RECTAL 2 TIMES DAILY
Status: DISCONTINUED | OUTPATIENT
Start: 2024-06-06 | End: 2024-06-08 | Stop reason: HOSPADM

## 2024-06-06 RX ORDER — POLYETHYLENE GLYCOL 3350 17 G/17G
17 POWDER, FOR SOLUTION ORAL DAILY PRN
Status: DISCONTINUED | OUTPATIENT
Start: 2024-06-06 | End: 2024-06-08 | Stop reason: HOSPADM

## 2024-06-06 RX ORDER — SODIUM CHLORIDE 9 MG/ML
INJECTION, SOLUTION INTRAVENOUS PRN
Status: DISCONTINUED | OUTPATIENT
Start: 2024-06-06 | End: 2024-06-08 | Stop reason: HOSPADM

## 2024-06-06 RX ORDER — ONDANSETRON 4 MG/1
4 TABLET, ORALLY DISINTEGRATING ORAL EVERY 8 HOURS PRN
Status: DISCONTINUED | OUTPATIENT
Start: 2024-06-06 | End: 2024-06-08 | Stop reason: HOSPADM

## 2024-06-06 RX ORDER — GLUCAGON 1 MG/ML
1 KIT INJECTION PRN
Status: DISCONTINUED | OUTPATIENT
Start: 2024-06-06 | End: 2024-06-08 | Stop reason: HOSPADM

## 2024-06-06 RX ORDER — CARVEDILOL 25 MG/1
50 TABLET ORAL EVERY MORNING
Status: DISCONTINUED | OUTPATIENT
Start: 2024-06-07 | End: 2024-06-08 | Stop reason: HOSPADM

## 2024-06-06 RX ORDER — CALCIUM ACETATE 667 MG/1
4 CAPSULE ORAL
Status: DISCONTINUED | OUTPATIENT
Start: 2024-06-07 | End: 2024-06-08 | Stop reason: HOSPADM

## 2024-06-06 RX ORDER — ALBUTEROL SULFATE 90 UG/1
2 AEROSOL, METERED RESPIRATORY (INHALATION) EVERY 4 HOURS PRN
Status: DISCONTINUED | OUTPATIENT
Start: 2024-06-06 | End: 2024-06-07 | Stop reason: CLARIF

## 2024-06-06 RX ORDER — INSULIN LISPRO 100 [IU]/ML
0-4 INJECTION, SOLUTION INTRAVENOUS; SUBCUTANEOUS
Status: DISCONTINUED | OUTPATIENT
Start: 2024-06-07 | End: 2024-06-07

## 2024-06-06 RX ORDER — ONDANSETRON 2 MG/ML
4 INJECTION INTRAMUSCULAR; INTRAVENOUS EVERY 6 HOURS PRN
Status: DISCONTINUED | OUTPATIENT
Start: 2024-06-06 | End: 2024-06-08 | Stop reason: HOSPADM

## 2024-06-06 RX ORDER — ENOXAPARIN SODIUM 100 MG/ML
30 INJECTION SUBCUTANEOUS DAILY
Status: DISCONTINUED | OUTPATIENT
Start: 2024-06-07 | End: 2024-06-06

## 2024-06-06 RX ORDER — POTASSIUM CHLORIDE 20 MEQ/1
40 TABLET, EXTENDED RELEASE ORAL ONCE
Status: COMPLETED | OUTPATIENT
Start: 2024-06-06 | End: 2024-06-06

## 2024-06-06 RX ORDER — PANTOPRAZOLE SODIUM 40 MG/10ML
40 INJECTION, POWDER, LYOPHILIZED, FOR SOLUTION INTRAVENOUS 2 TIMES DAILY
Status: DISCONTINUED | OUTPATIENT
Start: 2024-06-07 | End: 2024-06-06 | Stop reason: SDUPTHER

## 2024-06-06 RX ORDER — ACETAMINOPHEN 325 MG/1
650 TABLET ORAL EVERY 6 HOURS PRN
Status: DISCONTINUED | OUTPATIENT
Start: 2024-06-06 | End: 2024-06-08 | Stop reason: HOSPADM

## 2024-06-06 RX ORDER — SODIUM CHLORIDE 0.9 % (FLUSH) 0.9 %
5-40 SYRINGE (ML) INJECTION PRN
Status: DISCONTINUED | OUTPATIENT
Start: 2024-06-06 | End: 2024-06-08 | Stop reason: HOSPADM

## 2024-06-06 RX ORDER — NEPHROCAP 1 MG
1 CAP ORAL DAILY
Status: DISCONTINUED | OUTPATIENT
Start: 2024-06-07 | End: 2024-06-08 | Stop reason: HOSPADM

## 2024-06-06 RX ORDER — ACETAMINOPHEN 650 MG/1
650 SUPPOSITORY RECTAL EVERY 6 HOURS PRN
Status: DISCONTINUED | OUTPATIENT
Start: 2024-06-06 | End: 2024-06-08 | Stop reason: HOSPADM

## 2024-06-06 RX ORDER — DEXTROSE MONOHYDRATE 100 MG/ML
INJECTION, SOLUTION INTRAVENOUS CONTINUOUS PRN
Status: DISCONTINUED | OUTPATIENT
Start: 2024-06-06 | End: 2024-06-08 | Stop reason: HOSPADM

## 2024-06-06 RX ORDER — INSULIN LISPRO 100 [IU]/ML
0-4 INJECTION, SOLUTION INTRAVENOUS; SUBCUTANEOUS NIGHTLY
Status: DISCONTINUED | OUTPATIENT
Start: 2024-06-06 | End: 2024-06-07

## 2024-06-06 RX ORDER — SODIUM CHLORIDE 0.9 % (FLUSH) 0.9 %
5-40 SYRINGE (ML) INJECTION EVERY 12 HOURS SCHEDULED
Status: DISCONTINUED | OUTPATIENT
Start: 2024-06-06 | End: 2024-06-08 | Stop reason: HOSPADM

## 2024-06-06 RX ORDER — CARVEDILOL 25 MG/1
25 TABLET ORAL
Status: DISCONTINUED | OUTPATIENT
Start: 2024-06-06 | End: 2024-06-08 | Stop reason: HOSPADM

## 2024-06-06 RX ADMIN — PANTOPRAZOLE SODIUM 80 MG: 40 INJECTION, POWDER, FOR SOLUTION INTRAVENOUS at 20:33

## 2024-06-06 RX ADMIN — SODIUM CHLORIDE, PRESERVATIVE FREE 5 ML: 5 INJECTION INTRAVENOUS at 23:17

## 2024-06-06 RX ADMIN — POTASSIUM CHLORIDE 40 MEQ: 1500 TABLET, EXTENDED RELEASE ORAL at 22:51

## 2024-06-06 ASSESSMENT — ENCOUNTER SYMPTOMS
DIARRHEA: 0
SHORTNESS OF BREATH: 1
ABDOMINAL PAIN: 0
CHEST TIGHTNESS: 0
COUGH: 0
BLOOD IN STOOL: 1
NAUSEA: 0
RECTAL PAIN: 0
VOMITING: 0

## 2024-06-06 NOTE — ED PROVIDER NOTES
38-year-old female with end-stage renal dialysis does not make urine he gets dialysis Tuesdays Thursdays Fridays presents to the emergency department for the complaints of lightheadedness and shortness of breath has been going on for 1 week.  She stated her symptoms worsens with exertion and better with rest.  She says she has a history of iron deficiency anemia and has not had blood transfusions in the past in which she feels similar symptoms for the past week.  She was at dialysis today and was moved to 2.3 L of fluid but was too fatigued she said her hemoglobin was very low and was sent to the ER for further evaluation.  She denies the following: Fever, chills, chest pain, prior chest pain, syncope, headache, vision changes, GI bleed, focal weakness/numbness.    Chief Complaint   Patient presents with    Abnormal Lab     Reports that she was told to come from dialysis for hgb of 5. Reports generalized weakness and sob.        Review of Systems   Pertinent stated that  Physical Exam  Vitals reviewed.   Constitutional:       General: She is not in acute distress.     Appearance: She is not ill-appearing.   HENT:      Head: Normocephalic.      Right Ear: External ear normal.      Left Ear: External ear normal.      Nose: Nose normal.      Mouth/Throat:      Mouth: Mucous membranes are moist.   Eyes:      General:         Right eye: No discharge.         Left eye: No discharge.      Conjunctiva/sclera: Conjunctivae normal.   Cardiovascular:      Rate and Rhythm: Normal rate and regular rhythm.      Heart sounds:      No friction rub. No gallop.   Pulmonary:      Effort: No respiratory distress.      Breath sounds: No stridor.   Abdominal:      General: There is no distension.      Tenderness: There is no abdominal tenderness. There is no guarding or rebound.   Musculoskeletal:         General: No deformity or signs of injury.      Cervical back: Normal range of motion and neck supple. No rigidity or tenderness.

## 2024-06-07 ENCOUNTER — APPOINTMENT (OUTPATIENT)
Dept: GENERAL RADIOLOGY | Age: 39
DRG: 377 | End: 2024-06-07
Payer: MEDICARE

## 2024-06-07 LAB
ANION GAP SERPL CALCULATED.3IONS-SCNC: 14 MMOL/L (ref 7–16)
BASOPHILS # BLD: 0.02 K/UL (ref 0–0.2)
BASOPHILS NFR BLD: 0 % (ref 0–2)
BUN SERPL-MCNC: 32 MG/DL (ref 6–20)
CALCIUM SERPL-MCNC: 9.1 MG/DL (ref 8.6–10.2)
CHLORIDE SERPL-SCNC: 94 MMOL/L (ref 98–107)
CO2 SERPL-SCNC: 25 MMOL/L (ref 22–29)
CREAT SERPL-MCNC: 3.7 MG/DL (ref 0.5–1)
EKG ATRIAL RATE: 109 BPM
EKG P AXIS: 56 DEGREES
EKG P-R INTERVAL: 122 MS
EKG Q-T INTERVAL: 334 MS
EKG QRS DURATION: 66 MS
EKG QTC CALCULATION (BAZETT): 449 MS
EKG R AXIS: 18 DEGREES
EKG T AXIS: 120 DEGREES
EKG VENTRICULAR RATE: 109 BPM
EOSINOPHIL # BLD: 0.16 K/UL (ref 0.05–0.5)
EOSINOPHILS RELATIVE PERCENT: 3 % (ref 0–6)
ERYTHROCYTE [DISTWIDTH] IN BLOOD BY AUTOMATED COUNT: 16.8 % (ref 11.5–15)
GFR, ESTIMATED: 15 ML/MIN/1.73M2
GLUCOSE BLD-MCNC: 107 MG/DL (ref 74–99)
GLUCOSE BLD-MCNC: 138 MG/DL (ref 74–99)
GLUCOSE BLD-MCNC: 208 MG/DL (ref 74–99)
GLUCOSE BLD-MCNC: 242 MG/DL (ref 74–99)
GLUCOSE BLD-MCNC: 310 MG/DL (ref 74–99)
GLUCOSE BLD-MCNC: 93 MG/DL (ref 74–99)
GLUCOSE SERPL-MCNC: 563 MG/DL (ref 74–99)
HBA1C MFR BLD: 6 % (ref 4–5.6)
HCT VFR BLD AUTO: 26.9 % (ref 34–48)
HCT VFR BLD AUTO: 27.7 % (ref 34–48)
HCT VFR BLD AUTO: 28.3 % (ref 34–48)
HCT VFR BLD AUTO: 29 % (ref 34–48)
HGB BLD-MCNC: 8.6 G/DL (ref 11.5–15.5)
HGB BLD-MCNC: 8.8 G/DL (ref 11.5–15.5)
HGB BLD-MCNC: 9.1 G/DL (ref 11.5–15.5)
HGB BLD-MCNC: 9.4 G/DL (ref 11.5–15.5)
IMM GRANULOCYTES # BLD AUTO: <0.03 K/UL (ref 0–0.58)
IMM GRANULOCYTES NFR BLD: 0 % (ref 0–5)
LYMPHOCYTES NFR BLD: 0.83 K/UL (ref 1.5–4)
LYMPHOCYTES RELATIVE PERCENT: 13 % (ref 20–42)
MCH RBC QN AUTO: 31 PG (ref 26–35)
MCHC RBC AUTO-ENTMCNC: 32.2 G/DL (ref 32–34.5)
MCV RBC AUTO: 96.3 FL (ref 80–99.9)
MONOCYTES NFR BLD: 0.5 K/UL (ref 0.1–0.95)
MONOCYTES NFR BLD: 8 % (ref 2–12)
NEUTROPHILS NFR BLD: 76 % (ref 43–80)
NEUTS SEG NFR BLD: 4.96 K/UL (ref 1.8–7.3)
PLATELET # BLD AUTO: 227 K/UL (ref 130–450)
PMV BLD AUTO: 9.7 FL (ref 7–12)
POTASSIUM SERPL-SCNC: 4.7 MMOL/L (ref 3.5–5)
RBC # BLD AUTO: 2.94 M/UL (ref 3.5–5.5)
SODIUM SERPL-SCNC: 133 MMOL/L (ref 132–146)
WBC OTHER # BLD: 6.5 K/UL (ref 4.5–11.5)

## 2024-06-07 PROCEDURE — 1200000000 HC SEMI PRIVATE

## 2024-06-07 PROCEDURE — P9016 RBC LEUKOCYTES REDUCED: HCPCS

## 2024-06-07 PROCEDURE — 36430 TRANSFUSION BLD/BLD COMPNT: CPT

## 2024-06-07 PROCEDURE — 85014 HEMATOCRIT: CPT

## 2024-06-07 PROCEDURE — 80048 BASIC METABOLIC PNL TOTAL CA: CPT

## 2024-06-07 PROCEDURE — 2700000000 HC OXYGEN THERAPY PER DAY

## 2024-06-07 PROCEDURE — 6370000000 HC RX 637 (ALT 250 FOR IP)

## 2024-06-07 PROCEDURE — 85025 COMPLETE CBC W/AUTO DIFF WBC: CPT

## 2024-06-07 PROCEDURE — 6360000002 HC RX W HCPCS: Performed by: STUDENT IN AN ORGANIZED HEALTH CARE EDUCATION/TRAINING PROGRAM

## 2024-06-07 PROCEDURE — 93010 ELECTROCARDIOGRAM REPORT: CPT | Performed by: INTERNAL MEDICINE

## 2024-06-07 PROCEDURE — C9113 INJ PANTOPRAZOLE SODIUM, VIA: HCPCS | Performed by: STUDENT IN AN ORGANIZED HEALTH CARE EDUCATION/TRAINING PROGRAM

## 2024-06-07 PROCEDURE — 99231 SBSQ HOSP IP/OBS SF/LOW 25: CPT | Performed by: STUDENT IN AN ORGANIZED HEALTH CARE EDUCATION/TRAINING PROGRAM

## 2024-06-07 PROCEDURE — 82962 GLUCOSE BLOOD TEST: CPT

## 2024-06-07 PROCEDURE — 6370000000 HC RX 637 (ALT 250 FOR IP): Performed by: STUDENT IN AN ORGANIZED HEALTH CARE EDUCATION/TRAINING PROGRAM

## 2024-06-07 PROCEDURE — 2580000003 HC RX 258: Performed by: STUDENT IN AN ORGANIZED HEALTH CARE EDUCATION/TRAINING PROGRAM

## 2024-06-07 PROCEDURE — 2500000003 HC RX 250 WO HCPCS: Performed by: STUDENT IN AN ORGANIZED HEALTH CARE EDUCATION/TRAINING PROGRAM

## 2024-06-07 PROCEDURE — 74018 RADEX ABDOMEN 1 VIEW: CPT

## 2024-06-07 PROCEDURE — 85018 HEMOGLOBIN: CPT

## 2024-06-07 PROCEDURE — 36415 COLL VENOUS BLD VENIPUNCTURE: CPT

## 2024-06-07 PROCEDURE — 6370000000 HC RX 637 (ALT 250 FOR IP): Performed by: INTERNAL MEDICINE

## 2024-06-07 PROCEDURE — A4216 STERILE WATER/SALINE, 10 ML: HCPCS | Performed by: STUDENT IN AN ORGANIZED HEALTH CARE EDUCATION/TRAINING PROGRAM

## 2024-06-07 PROCEDURE — 94664 DEMO&/EVAL PT USE INHALER: CPT

## 2024-06-07 RX ORDER — INSULIN LISPRO 100 [IU]/ML
0-4 INJECTION, SOLUTION INTRAVENOUS; SUBCUTANEOUS NIGHTLY
Status: DISCONTINUED | OUTPATIENT
Start: 2024-06-07 | End: 2024-06-08 | Stop reason: HOSPADM

## 2024-06-07 RX ORDER — ALBUTEROL SULFATE 2.5 MG/3ML
2.5 SOLUTION RESPIRATORY (INHALATION) EVERY 4 HOURS PRN
Status: DISCONTINUED | OUTPATIENT
Start: 2024-06-07 | End: 2024-06-08 | Stop reason: HOSPADM

## 2024-06-07 RX ORDER — INSULIN LISPRO 100 [IU]/ML
0-16 INJECTION, SOLUTION INTRAVENOUS; SUBCUTANEOUS
Status: DISCONTINUED | OUTPATIENT
Start: 2024-06-07 | End: 2024-06-08 | Stop reason: HOSPADM

## 2024-06-07 RX ADMIN — HYDROCORTISONE ACETATE 25 MG: 25 SUPPOSITORY RECTAL at 21:00

## 2024-06-07 RX ADMIN — SODIUM CHLORIDE, PRESERVATIVE FREE 10 ML: 5 INJECTION INTRAVENOUS at 12:21

## 2024-06-07 RX ADMIN — SODIUM CHLORIDE, PRESERVATIVE FREE 10 ML: 5 INJECTION INTRAVENOUS at 20:44

## 2024-06-07 RX ADMIN — CARVEDILOL 50 MG: 25 TABLET, FILM COATED ORAL at 12:18

## 2024-06-07 RX ADMIN — ALBUTEROL SULFATE 2.5 MG: 2.5 SOLUTION RESPIRATORY (INHALATION) at 18:23

## 2024-06-07 RX ADMIN — HYDROCORTISONE ACETATE 25 MG: 25 SUPPOSITORY RECTAL at 01:26

## 2024-06-07 RX ADMIN — INSULIN LISPRO 4 UNITS: 100 INJECTION, SOLUTION INTRAVENOUS; SUBCUTANEOUS at 12:27

## 2024-06-07 RX ADMIN — INSULIN LISPRO 4 UNITS: 100 INJECTION, SOLUTION INTRAVENOUS; SUBCUTANEOUS at 20:42

## 2024-06-07 RX ADMIN — PANTOPRAZOLE SODIUM 40 MG: 40 INJECTION, POWDER, FOR SOLUTION INTRAVENOUS at 20:43

## 2024-06-07 RX ADMIN — CARVEDILOL 25 MG: 25 TABLET, FILM COATED ORAL at 17:56

## 2024-06-07 RX ADMIN — PANTOPRAZOLE SODIUM 40 MG: 40 INJECTION, POWDER, FOR SOLUTION INTRAVENOUS at 12:18

## 2024-06-07 RX ADMIN — CALCIUM ACETATE 2668 MG: 667 CAPSULE ORAL at 16:51

## 2024-06-07 NOTE — PROGRESS NOTES
[facility] Hospitalist Progress Note      Synopsis: Patient with history of end-stage renal disease, chronic anemia admitted on 6/6/2024 for acute on chronic anemia with reported bright red blood per rectum.  Surgery was consulted and is recommending EGD and colonoscopy, this is planned for Monday.    Subjective  Patient was hopeful to discharge today as she has family responsibilities  Exam:  BP (!) 166/78   Pulse (!) 109   Temp 98.5 °F (36.9 °C) (Temporal)   Resp 20   SpO2 100%   General appearance: No apparent distress, appears stated age and cooperative.  HEENT: Pupils equal, round, and reactive to light. Conjunctivae/corneas clear.  Neck: Supple. No jugular venous distention. Trachea midline.  Respiratory:  Normal respiratory effort. Clear to auscultation, bilaterally without Rales/Wheezes/Rhonchi.  Cardiovascular: Regular rate and rhythm with normal S1/S2 without murmurs, rubs or gallops.  Abdomen: Soft, non-tender, non-distended with normal bowel sounds.  Musculoskeletal: No clubbing, cyanosis or edema bilaterally. Brisk capillary refill. 2+ lower extremity pulses (dorsalis pedis).   Skin:  No rashes    Neurologic: awake, alert and following commands     Medications:  Reviewed    Infusion Medications    sodium chloride      sodium chloride      dextrose       Scheduled Medications    insulin lispro  0-16 Units SubCUTAneous TID WC    insulin lispro  0-4 Units SubCUTAneous Nightly    sodium chloride flush  5-40 mL IntraVENous 2 times per day    pantoprazole (PROTONIX) 40 mg in sodium chloride (PF) 0.9 % 10 mL injection  40 mg IntraVENous Q12H    Virt-Caps  1 capsule Oral Daily    calcium acetate  4 capsule Oral TID WC    carvedilol  50 mg Oral QAM    carvedilol  25 mg Oral Dinner    hydrocortisone  25 mg Rectal BID     PRN Meds: albuterol, sodium chloride, sodium chloride flush, sodium chloride, ondansetron **OR** ondansetron, polyethylene glycol, acetaminophen **OR** acetaminophen, sodium chloride, glucose,

## 2024-06-07 NOTE — ED NOTES
Spoke with Dr. Ponce about pts BGL and pt upset over NPO diet. Dr. Ponce gave verbal orders to give pt a regular diet

## 2024-06-07 NOTE — H&P
[facility] Hospitalist Group History and Physical          PCP: May Waters DO    Date of Admission: 6/6/2024    Date of Service: Pt seen/examined on 6/6/2024 and is admitted to Inpatient with expected LOS greater than two midnights due to medical therapy.     Chief Complaint:  had concerns including Abnormal Lab (Reports that she was told to come from dialysis for hgb of 5. Reports generalized weakness and sob. ).    History Of Present Illness:    Ms. Michelle Nettles, a 38 y.o. year old female  who  has a past medical history of JOLLY (acute kidney injury) ([facility]), AVF (arteriovenous fistula) ([facility]), Chronic kidney disease, Dialysis AV fistula malfunction, initial encounter ([facility]), DM type 1 (diabetes mellitus, type 1) ([facility]), Encounter regarding vascular access for dialysis for ESRD ([facility]), ESRD (end stage renal disease) ([facility]), Hemodialysis patient ([facility]), Hypertension, and Tobacco abuse.       The patient presented to [facility] emergency room for concerns of lightheadedness and shortness of breath that has been going on for past 1 week.  Patient was at dialysis today, mobile 2.3 L of fluid was removed however patient was too fatigued to continue more, her hemoglobin was reported to be low about 5 g/dL and was sent to ER for further evaluation.  She does complain of shortness of breath on exertion.  She did report some black tarry stool.  Fecal occult blood test was done in ED which was positive.    In the ED,  Initial blood pressure was 89/65 received fluid boluses which improved her blood pressure to 128/57.  Pulse rate 110, saturating well on room air.  Afebrile.  Lab work CBC with hemoglobin 6.4, hematocrit 19.6, WBC 4.9  Sodium 133, potassium 3.2, chloride 96, bicarb 30, BUN/creatinine 16/2.1  She was started on IV Protonix.  Patient is getting 1 unit of packed red blood cell.  Patient reported that she underwent colonoscopy about a year ago which showed was  N, DO   sodium chloride (OCEAN, BABY AYR) 0.65 % nasal spray 1 spray by Nasal route every 2 hours as needed for Congestion 7/4/23   Trenton Peters MD   hydrALAZINE (APRESOLINE) 50 MG tablet Take 1 tablet by mouth every 8 hours as needed Take only if systolic BP is >140 3/31/23   May Waters N, DO   calcium acetate (PHOSLO) 667 MG CAPS capsule Take 4 capsules by mouth 3 times daily (with meals)    ProviderAdrianne MD B Complex-C-Folic Acid (RENAL-JACOB) 0.8 MG TABS Take 1 tablet by mouth daily    ProviderAdrianne MD   albuterol sulfate  (90 Base) MCG/ACT inhaler Inhale 2 puffs into the lungs every 4-6 hours as needed for Wheezing or Shortness of Breath 8/17/21   Ellie Saldana APRN - CNP       Allergies:  Patient has no known allergies.    Social History:    TOBACCO:   reports that she has quit smoking. Her smoking use included cigarettes. She has a 7.5 pack-year smoking history. She has been exposed to tobacco smoke. She has never used smokeless tobacco.  ETOH:   reports that she does not currently use alcohol.    Family History:    Reviewed in detail and negative for DM, CAD, Cancer, CVA. Positive as follows\"      Problem Relation Age of Onset    Stroke Mother     Cancer Father     Diabetes Paternal Aunt        REVIEW OF SYSTEMS:   Pertinent positives as noted in the HPI. All other systems reviewed and negative.    PHYSICAL EXAM:  BP (!) 128/57   Pulse (!) 110   Temp 98.6 °F (37 °C)   Resp 14   SpO2 98%   General appearance: No apparent distress, appears stated age and cooperative.  HEENT: Normal cephalic, atraumatic without obvious deformity. Pupils equal, round, and reactive to light.  Extra ocular muscles intact. Conjunctivae/corneas clear.  Neck: Supple, with full range of motion. No jugular venous distention. Trachea midline.  Respiratory: Diminished bilaterally, no wheezes or rhonchi or crackles  Cardiovascular: S1, S2 heard no murmur, rubs or gallops  Abdomen: Soft,

## 2024-06-07 NOTE — CARE COORDINATION
06/07/24 Transition of care:  Pt admitted from ED for gi bleed pt was sent from dialysis for Hgb of 5 Surgery consulted and plan to do scopes on Monday if pt is still here Nephrology consulted [facility] Met with pt to discuss transition of care and discharge preferences Pt lives with her daughter in a one story home Pt is independent with ADLs but indicates she needs help A referral was previously made and pt states that [facility] has been out to do an assessment but she has not heard Called [facility] they are unable to find any information Given number for [facility] Spoke with her  there Almita Alaniz [phone] she will email the assessment team and contact the patient to advise of services Pt has been updated Plan is home with medically stable Pt will drive self home as truck is in ED lot pt has portable concentrator in vehicle CM/SW to follow Electronically signed by Epi Ramires RN CM on 6/7/2024 at 5:01 PM     Case Management Assessment  Initial Evaluation    Date/Time of Evaluation: 6/7/2024 5:01 PM  Assessment Completed by: Epi Ramires RN    If patient is discharged prior to next notation, then this note serves as note for discharge by case management.    Patient Name: Michelle Nettles                   YOB: 1985  Diagnosis: GI bleed [K92.2]  ESRD on dialysis (HCC) [N18.6, Z99.2]  Gastrointestinal hemorrhage, unspecified gastrointestinal hemorrhage type [K92.2]  Anemia, unspecified type [D64.9]                   Date / Time: 6/6/2024  5:55 PM    Patient Admission Status: Inpatient   Readmission Risk (Low < 19, Mod (19-27), High > 27): Readmission Risk Score: 20    Current PCP: May Waters, DO  PCP verified by CM? Yes    Chart Reviewed: Yes      History Provided by: Patient  Patient Orientation: Alert and Oriented    Patient Cognition: Alert    Hospitalization in the last 30 days (Readmission):  No    If yes, Readmission

## 2024-06-07 NOTE — PROGRESS NOTES
GENERAL SURGERY  DAILY PROGRESS NOTE    Patient's Name/Date of Birth: Michelle Nettles / 1985    Date: 2024     Chief Complaint   Patient presents with    Abnormal Lab     Reports that she was told to come from dialysis for hgb of 5. Reports generalized weakness and sob.         Subjective:  Pt resting in bed. Denies abdominal pain, nausea/vomiting. Tolerating PO. States she needs to go home for her daughter who has CP.      Objective:  Last 24Hrs  Temp  Av.2 °F (36.8 °C)  Min: 97.8 °F (36.6 °C)  Max: 98.6 °F (37 °C)  Resp  Av.1  Min: 14  Max: 27  Pulse  Av  Min: 96  Max: 110  Systolic (24hrs), Av , Min:89 , Max:132     Diastolic (24hrs), Av, Min:45, Max:79    SpO2  Av.9 %  Min: 97 %  Max: 100 %    No intake/output data recorded.      General: In no acute distress, alert and oriented x4  Cardiovascular: Warm throughout, no edema  Respiratory: no respiratory distress, equal chest rise on 3L  Abdomen: soft,  nontender, nondistended  Skin: no obvious rashes or lesions appreciated, no jaundice  Extremities: atraumatic, no focal motor deficits, no open wounds      CBC  Recent Labs     24  1857 24  0356   WBC 4.9  --    RBC 1.98*  --    HGB 6.4* 8.6*   HCT 19.6* 26.9*   MCV 99.0  --    MCH 32.3  --    MCHC 32.7  --    RDW 17.5*  --      --    MPV 9.5  --        CMP  Recent Labs     24  1857      K 3.2*   CL 96*   CO2 30*   BUN 16   CREATININE 2.1*   GLUCOSE 214*   CALCIUM 8.7         Assessment/Plan:    Patient Active Problem List   Diagnosis    Poorly controlled type 1 diabetes mellitus (HCC)    HTN (hypertension), benign    Facial cellulitis    Acute hyperkalemia    Nasal polyp    Periorbital cellulitis of right eye    Periorbital cellulitis    Vitamin B deficiency, unspecified    Nasal congestion    Hidradenitis suppurativa    Symptomatic anemia    Anemia    Gall stone    Dietary noncompliance    Metabolic encephalopathy    Hypoxia     Hypotension    History of venous thromboembolism    Chronic systolic (congestive) heart failure    MRSA colonization    ESRD on hemodialysis (HCC)    SOB (shortness of breath)    Chronic hypoxic respiratory failure, on home oxygen therapy (HCC)    Acute on chronic anemia    Hyperglycemia due to diabetes mellitus (HCC)    Hypercalcemia    GI bleed       38 y.o. female with acute on chronic anemia with reported BRPBR      - pt responded appropriately to blood transfusion, Hb 8.6 from 6.4   - recommend EGD and colonoscopy to further evaluate source of anemia, soonest we would be able to do is Monday   - okay for diet from surgical perspective   - will plan for scopes if pt is still here   - discussed with Dr. Brayden Ponce,   General Surgery Resident, PGY-1    Electronically signed on 6/7/2024 at 7:03 AM     Attending Attestation     I have seen and examined this patient.  I have personally reviewed and interpreted all relevant labs and imaging.  I agree with the resident documentation.    CBC:   Lab Results   Component Value Date/Time    WBC 4.9 06/06/2024 06:57 PM    RBC 1.98 06/06/2024 06:57 PM    RBC 2.92 02/22/2022 08:11 PM    HGB 8.6 06/07/2024 03:56 AM    HCT 26.9 06/07/2024 03:56 AM    MCV 99.0 06/06/2024 06:57 PM    MCH 32.3 06/06/2024 06:57 PM    MCHC 32.7 06/06/2024 06:57 PM    RDW 17.5 06/06/2024 06:57 PM     06/06/2024 06:57 PM    MPV 9.5 06/06/2024 06:57 PM     CMP:    Lab Results   Component Value Date/Time     06/07/2024 10:19 AM    K 4.7 06/07/2024 10:19 AM    K 5.2 06/30/2023 05:52 AM    CL 94 06/07/2024 10:19 AM    CO2 25 06/07/2024 10:19 AM    BUN 32 06/07/2024 10:19 AM    CREATININE 3.7 06/07/2024 10:19 AM    GFRAA 5 07/29/2022 01:14 AM    LABGLOM 15 06/07/2024 10:19 AM    LABGLOM 8 04/02/2024 11:30 AM    GLUCOSE 563 06/07/2024 10:19 AM    GLUCOSE 279 07/27/2011 04:40 PM    CALCIUM 9.1 06/07/2024 10:19 AM    BILITOT 0.2 04/02/2024 11:30 AM    ALKPHOS 106 04/02/2024 11:30

## 2024-06-07 NOTE — CONSULTS
[facility]              Nephrology Consult Note    Patient's Name: Michelle Nettles     Reason for Consult: End-Stage Renal Disease    Chief Complaint: abnormal lab  History Obtained from: patient, electronic medical record, past medical records     History of Present Illness:     Michelle Nettles is a 38 year old female, with past medical history of ESRD on HD, type 1 DM, and HTN, who presented to the ED on 6/6 for an abnormal lab. Vital signs on 6/6 include temperature 98.6, RR 14, pulse 110, BP 89/65, 100% SPO2 on nasal cannula. Lab data on 6/6 includes sodium 138, potassium 3.2, CO2 30, BUN 16, creatinine 2.1, magnesium 2.0, calcium 8.7, WBC 4.9, Hgb 6.4. Imaging on 6/6 includes a chest xray, which showed hazy in density in the right lower lung, slightly progressed, suspect interval development of hazy density in the left mid lung, developing/progressed pneumonia cannot be excluded.  Nephrology was consulted to see the patient for ESRD on HD. The patient dialyzes outpatient at [facility] 2nd shift via a left arm AV fistula. Her last outpatient dialysis treatment was 6/6.  At present, patient was seen and examined. She reports feeling better today. She reports weakness and fatigue that has improved. She reports not seeing blood in her stool, but noticing blood on the toilet paper after wiping. She denies any pain. She denies shortness of breath. She denies n/v. She denies fevers/chills. She denies headaches or dizziness.    PMH:    Past Medical History:   Diagnosis Date    JOLLY (acute kidney injury) ([facility]) 4/5/2016    AVF (arteriovenous fistula) ([facility]) 02/19/2018    let arm    Chronic kidney disease     Dialysis AV fistula malfunction, initial encounter ([facility]) 11/7/2019    DM type 1 (diabetes mellitus, type 1) ([facility])     Encounter regarding vascular access for dialysis for ESRD ([facility]) 07/12/2018    ESRD (end stage renal disease) ([facility])     Hemodialysis patient ([facility])     amrita  ng/mL  Adult females 17 - 60 years:    13 - 150 ng/mL  Adults greater than 60 years:   no established reference range  Pediatrics:  no established reference range       Iron   Date Value Ref Range Status   03/23/2024 116 37 - 145 ug/dL Final     TIBC   Date Value Ref Range Status   03/23/2024 230 (L) 250 - 450 ug/dL Final       Vitamin B-12   Date Value Ref Range Status   03/23/2024 1852 (H) 211 - 946 pg/mL Final       Folate   Date Value Ref Range Status   03/23/2024 12.3 4.8 - 24.2 ng/mL Final         Lab Results   Component Value Date/Time    COLORU Yellow 11/23/2017 10:05 PM    NITRU Negative 11/23/2017 10:05 PM    GLUCOSEU 500 11/23/2017 10:05 PM    GLUCOSEU >=1000 11/21/2010 10:30 PM    KETUA Negative 11/23/2017 10:05 PM    UROBILINOGEN 0.2 11/23/2017 10:05 PM    BILIRUBINUR Negative 11/23/2017 10:05 PM    BILIRUBINUR NEGATIVE 11/21/2010 10:30 PM       Lab Results   Component Value Date/Time    PROTEIN 7.1 04/02/2024 11:30 AM       No components found for: \"URIC\"    No results found for: \"LIPIDPAN\"      Assessment and Plan:    ESRD on HD  Outpatient [facility] TTS via left arm AV fistula  Last outpatient dialysis 6/6  Monitor labs   Continue HD TTS while inpatient-plan for next HD 6/8    2.  Anemia with CKD  With concern for GIB  Fecal occult positive  Xray abdomen --> nonobstructive bowel gas pattern with stool scattered throughout the colon, no free intraperitoneal air, no evidence of gastric distension  Hemoglobin target 10-12  Hemoglobin 6.4 on admission   S/p 2 unit PRBCs overnight  No VALENTIN as patient is being actively transfused  Transfuse for hemoglobin<7, as per primary service  Monitor H&H  General surgery following  Plan for EGD and colonoscopy on Monday 6/10     3.  Secondary hyperparathyroidism of renal origin   and phosphorus 6.7 on 6/4 in the outpatient setting  Check phosphorus in the AM  On calcium acetate  Low phosphorus diet  Monitor labs     4.  Hypertension with CKD 5/ESRD  BP

## 2024-06-07 NOTE — CONSULTS
GENERAL SURGERY  CONSULT NOTE  2024    Physician Consulted: Dr. Cedillo   Reason for Consult: anemia  Referring Physician: Dr. Fran ORDONEZ Lencho Nettles is a 38 y.o. female who presents to general surgery service for evaluation of anemia. She experienced some lightheadedness and fatigue and was found to have low Hb while at dialysis. She denies any abdominal pain, nausea or emesis. States she has some discomfort when eating due to pain in her tooth which she has been managing with tylenol as opposed to previous NSAID use. She states she noticed some bright red blood on the toilet paper when she wiped this morning and has been having some dark tarry stools. Pt was seen by our service in January of this past year and underwent an EGD which did not show any inflammation or ulcers. She had not yet followed up for colonoscopy.    She states she does smoke but has been cutting back and smokes less than 1 pack a week. Denies any alcohol use. She denies any previous abdominal surgeries. Pt states her dad  of gastric cancer in .     Pt is on plavix after she had angioplasty done in 2023 with Dr. VERNON. Last dose was yesterday.    Hb 6.4 today and baseline appears to be around 8.      Past Medical History:   Diagnosis Date    JOLLY (acute kidney injury) ([facility]) 2016    AVF (arteriovenous fistula) ([facility]) 2018    let arm    Chronic kidney disease     Dialysis AV fistula malfunction, initial encounter ([facility]) 2019    DM type 1 (diabetes mellitus, type 1) ([facility])     Encounter regarding vascular access for dialysis for ESRD ([facility]) 2018    ESRD (end stage renal disease) ([facility])     Hemodialysis patient ([facility])     amrita eunice  / graft in left arm    Hypertension     Tobacco abuse 2016       Past Surgical History:   Procedure Laterality Date     SECTION  2013    CYST INCISION AND DRAINAGE  2011    perirectal abscess. [facility]. Dr. Fleming    DIALYSIS FISTULA CREATION  pk-yrs)     Types: Cigarettes     Passive exposure: Past    Smokeless tobacco: Never   Vaping Use    Vaping Use: Never used   Substance Use Topics    Alcohol use: Not Currently     Alcohol/week: 0.0 standard drinks of alcohol    Drug use: Yes     Types: Marijuana (Weed)         Review of Systems   Constitutional:  Positive for fatigue. Negative for chills and fever.   HENT:  Negative for congestion and mouth sores.    Respiratory:  Positive for shortness of breath. Negative for cough and chest tightness.    Cardiovascular:  Negative for palpitations and leg swelling.   Gastrointestinal:  Positive for blood in stool. Negative for abdominal pain, diarrhea, nausea, rectal pain and vomiting.   Endocrine: Negative for polyuria.   Genitourinary:  Negative for difficulty urinating and dysuria.   Musculoskeletal:  Negative for gait problem and neck stiffness.   Skin:  Negative for rash.   Hematological:  Does not bruise/bleed easily.          PHYSICAL EXAM:    Vitals:    06/06/24 2239   BP: (!) 94/45   Pulse: (!) 107   Resp: 17   Temp: 98.1 °F (36.7 °C)   SpO2: 97%       General Appearance:  lying in bed  Skin:  warm and dry, no cyanosis  Head/face:  NCAT  Lungs:  normal respiratory effort on 3L  Heart:  mild tachycardia  Abdomen:  soft, nontender, nondistended   Extremities: atraumatic, no focal motor deficits   Female Rectal: external nonthrombosed hemorrhoids, rectal tone intact with likely right anterior internal hemorroid, brown stool on HAL, FOBT+    LABS:    CBC  Recent Labs     06/06/24  1857   WBC 4.9   HGB 6.4*   HCT 19.6*        BMP  Recent Labs     06/06/24  1857      K 3.2*   CL 96*   CO2 30*   BUN 16   CREATININE 2.1*   CALCIUM 8.7     Liver Function  No results for input(s): \"AMYLASE\", \"LIPASE\", \"BILITOT\", \"BILIDIR\", \"AST\", \"ALT\", \"ALKPHOS\", \"PROT\", \"LABALBU\" in the last 72 hours.  No results for input(s): \"LACTATE\" in the last 72 hours.  No results for input(s): \"INR\" in the last 72

## 2024-06-08 VITALS
BODY MASS INDEX: 28.67 KG/M2 | WEIGHT: 142.2 LBS | RESPIRATION RATE: 16 BRPM | HEART RATE: 98 BPM | OXYGEN SATURATION: 96 % | TEMPERATURE: 96.9 F | HEIGHT: 59 IN | DIASTOLIC BLOOD PRESSURE: 54 MMHG | SYSTOLIC BLOOD PRESSURE: 106 MMHG

## 2024-06-08 LAB
ABO/RH: NORMAL
ANION GAP SERPL CALCULATED.3IONS-SCNC: 15 MMOL/L (ref 7–16)
ANTIBODY SCREEN: NEGATIVE
ARM BAND NUMBER: NORMAL
BLOOD BANK BLOOD PRODUCT EXPIRATION DATE: NORMAL
BLOOD BANK BLOOD PRODUCT EXPIRATION DATE: NORMAL
BLOOD BANK DISPENSE STATUS: NORMAL
BLOOD BANK DISPENSE STATUS: NORMAL
BLOOD BANK ISBT PRODUCT BLOOD TYPE: 5100
BLOOD BANK ISBT PRODUCT BLOOD TYPE: 5100
BLOOD BANK PRODUCT CODE: NORMAL
BLOOD BANK PRODUCT CODE: NORMAL
BLOOD BANK SAMPLE EXPIRATION: NORMAL
BLOOD BANK UNIT TYPE AND RH: NORMAL
BLOOD BANK UNIT TYPE AND RH: NORMAL
BPU ID: NORMAL
BPU ID: NORMAL
BUN SERPL-MCNC: 49 MG/DL (ref 6–20)
CALCIUM SERPL-MCNC: 9 MG/DL (ref 8.6–10.2)
CHLORIDE SERPL-SCNC: 96 MMOL/L (ref 98–107)
CO2 SERPL-SCNC: 23 MMOL/L (ref 22–29)
COMPONENT: NORMAL
COMPONENT: NORMAL
CREAT SERPL-MCNC: 5.7 MG/DL (ref 0.5–1)
CROSSMATCH RESULT: NORMAL
CROSSMATCH RESULT: NORMAL
ERYTHROCYTE [DISTWIDTH] IN BLOOD BY AUTOMATED COUNT: 16.5 % (ref 11.5–15)
GFR, ESTIMATED: 9 ML/MIN/1.73M2
GLUCOSE BLD-MCNC: 168 MG/DL (ref 74–99)
GLUCOSE BLD-MCNC: 362 MG/DL (ref 74–99)
GLUCOSE BLD-MCNC: 86 MG/DL (ref 74–99)
GLUCOSE SERPL-MCNC: 382 MG/DL (ref 74–99)
HCT VFR BLD AUTO: 26.6 % (ref 34–48)
HCT VFR BLD AUTO: 27 % (ref 34–48)
HCT VFR BLD AUTO: 27.3 % (ref 34–48)
HGB BLD-MCNC: 8.8 G/DL (ref 11.5–15.5)
MAGNESIUM SERPL-MCNC: 2.1 MG/DL (ref 1.6–2.6)
MCH RBC QN AUTO: 31.1 PG (ref 26–35)
MCHC RBC AUTO-ENTMCNC: 32.2 G/DL (ref 32–34.5)
MCV RBC AUTO: 96.5 FL (ref 80–99.9)
PHOSPHATE SERPL-MCNC: 4.6 MG/DL (ref 2.5–4.5)
PLATELET # BLD AUTO: 216 K/UL (ref 130–450)
PMV BLD AUTO: 9.8 FL (ref 7–12)
POTASSIUM SERPL-SCNC: 4.4 MMOL/L (ref 3.5–5)
RBC # BLD AUTO: 2.83 M/UL (ref 3.5–5.5)
SODIUM SERPL-SCNC: 134 MMOL/L (ref 132–146)
TRANSFUSION STATUS: NORMAL
TRANSFUSION STATUS: NORMAL
UNIT DIVISION: 0
UNIT DIVISION: 0
UNIT ISSUE DATE/TIME: NORMAL
UNIT ISSUE DATE/TIME: NORMAL
WBC OTHER # BLD: 6.1 K/UL (ref 4.5–11.5)

## 2024-06-08 PROCEDURE — 5A1D70Z PERFORMANCE OF URINARY FILTRATION, INTERMITTENT, LESS THAN 6 HOURS PER DAY: ICD-10-PCS | Performed by: INTERNAL MEDICINE

## 2024-06-08 PROCEDURE — 6370000000 HC RX 637 (ALT 250 FOR IP): Performed by: INTERNAL MEDICINE

## 2024-06-08 PROCEDURE — 82962 GLUCOSE BLOOD TEST: CPT

## 2024-06-08 PROCEDURE — 36415 COLL VENOUS BLD VENIPUNCTURE: CPT

## 2024-06-08 PROCEDURE — 80048 BASIC METABOLIC PNL TOTAL CA: CPT

## 2024-06-08 PROCEDURE — A4216 STERILE WATER/SALINE, 10 ML: HCPCS | Performed by: STUDENT IN AN ORGANIZED HEALTH CARE EDUCATION/TRAINING PROGRAM

## 2024-06-08 PROCEDURE — C9113 INJ PANTOPRAZOLE SODIUM, VIA: HCPCS | Performed by: STUDENT IN AN ORGANIZED HEALTH CARE EDUCATION/TRAINING PROGRAM

## 2024-06-08 PROCEDURE — 6370000000 HC RX 637 (ALT 250 FOR IP): Performed by: STUDENT IN AN ORGANIZED HEALTH CARE EDUCATION/TRAINING PROGRAM

## 2024-06-08 PROCEDURE — 85018 HEMOGLOBIN: CPT

## 2024-06-08 PROCEDURE — 85027 COMPLETE CBC AUTOMATED: CPT

## 2024-06-08 PROCEDURE — 2700000000 HC OXYGEN THERAPY PER DAY

## 2024-06-08 PROCEDURE — 6360000002 HC RX W HCPCS: Performed by: STUDENT IN AN ORGANIZED HEALTH CARE EDUCATION/TRAINING PROGRAM

## 2024-06-08 PROCEDURE — 85014 HEMATOCRIT: CPT

## 2024-06-08 PROCEDURE — 84100 ASSAY OF PHOSPHORUS: CPT

## 2024-06-08 PROCEDURE — 2580000003 HC RX 258: Performed by: STUDENT IN AN ORGANIZED HEALTH CARE EDUCATION/TRAINING PROGRAM

## 2024-06-08 PROCEDURE — 90935 HEMODIALYSIS ONE EVALUATION: CPT

## 2024-06-08 PROCEDURE — 6370000000 HC RX 637 (ALT 250 FOR IP)

## 2024-06-08 PROCEDURE — 2500000003 HC RX 250 WO HCPCS: Performed by: STUDENT IN AN ORGANIZED HEALTH CARE EDUCATION/TRAINING PROGRAM

## 2024-06-08 PROCEDURE — 83735 ASSAY OF MAGNESIUM: CPT

## 2024-06-08 RX ORDER — INSULIN LISPRO 100 [IU]/ML
10 INJECTION, SOLUTION INTRAVENOUS; SUBCUTANEOUS ONCE
Status: COMPLETED | OUTPATIENT
Start: 2024-06-08 | End: 2024-06-08

## 2024-06-08 RX ADMIN — PANTOPRAZOLE SODIUM 40 MG: 40 INJECTION, POWDER, FOR SOLUTION INTRAVENOUS at 12:24

## 2024-06-08 RX ADMIN — INSULIN LISPRO 16 UNITS: 100 INJECTION, SOLUTION INTRAVENOUS; SUBCUTANEOUS at 06:33

## 2024-06-08 RX ADMIN — INSULIN LISPRO 10 UNITS: 100 INJECTION, SOLUTION INTRAVENOUS; SUBCUTANEOUS at 06:33

## 2024-06-08 RX ADMIN — CALCIUM ACETATE 2668 MG: 667 CAPSULE ORAL at 06:41

## 2024-06-08 RX ADMIN — CALCIUM ACETATE 2668 MG: 667 CAPSULE ORAL at 12:27

## 2024-06-08 RX ADMIN — SALINE NASAL SPRAY 1 SPRAY: 1.5 SOLUTION NASAL at 12:24

## 2024-06-08 RX ADMIN — Medication 1 MG: at 12:25

## 2024-06-08 RX ADMIN — SODIUM CHLORIDE, PRESERVATIVE FREE 10 ML: 5 INJECTION INTRAVENOUS at 12:30

## 2024-06-08 RX ADMIN — CARVEDILOL 50 MG: 25 TABLET, FILM COATED ORAL at 12:24

## 2024-06-08 RX ADMIN — HYDROCORTISONE ACETATE 25 MG: 25 SUPPOSITORY RECTAL at 12:28

## 2024-06-08 NOTE — PROGRESS NOTES
Department of Internal Medicine  Nephrology Attending Progress Note    SUBJECTIVE:  We are following this patient for end-stage renal failure.  From 6/7 HPI: \"Michelle Nettles is a 38 year old female, with past medical history of ESRD on HD, type 1 DM, and HTN, who presented to the ED on 6/6 for an abnormal lab. Vital signs on 6/6 include temperature 98.6, RR 14, pulse 110, BP 89/65, 100% SPO2 on nasal cannula. Lab data on 6/6 includes sodium 138, potassium 3.2, CO2 30, BUN 16, creatinine 2.1, magnesium 2.0, calcium 8.7, WBC 4.9, Hgb 6.4. Imaging on 6/6 includes a chest xray, which showed hazy in density in the right lower lung, slightly progressed, suspect interval development of hazy density in the left mid lung, developing/progressed pneumonia cannot be excluded.  Nephrology was consulted to see the patient for ESRD on HD. The patient dialyzes outpatient at [facility] TTS 2nd shift via a left arm AV fistula. Her last outpatient dialysis treatment was 6/6.  At present, patient was seen and examined. She reports feeling better today. She reports weakness and fatigue that has improved. She reports not seeing blood in her stool, but noticing blood on the toilet paper after wiping. She denies any pain. She denies shortness of breath. She denies n/v. She denies fevers/chills. She denies headaches or dizziness.\"    6/8/24: Examined during HD treatment. States she feels ok.Tolerating treatment well. No complaints. No sob, chest abd pain.       PROBLEM LIST:    Patient Active Problem List   Diagnosis    Poorly controlled type 1 diabetes mellitus (HCC)    HTN (hypertension), benign    Facial cellulitis    Acute hyperkalemia    Nasal polyp    Periorbital cellulitis of right eye    Periorbital cellulitis    Vitamin B deficiency, unspecified    Nasal congestion    Hidradenitis suppurativa    Symptomatic anemia    Anemia    Gall stone    Dietary noncompliance    Metabolic encephalopathy    Hypoxia    Hypotension     214* 563* 382*   CALCIUM 8.7 9.1 9.0     No results for input(s): \"ALT\", \"AST\", \"ALKPHOS\", \"BILITOT\", \"BILIDIR\" in the last 72 hours.  No results found for: \"LABALBU\"    Iron studies:  Lab Results   Component Value Date    FERRITIN 460 03/23/2024    IRON 116 03/23/2024    TIBC 230 (L) 03/23/2024     Vitamin B-12   Date Value Ref Range Status   03/23/2024 1852 (H) 211 - 946 pg/mL Final     Folate   Date Value Ref Range Status   03/23/2024 12.3 4.8 - 24.2 ng/mL Final       Bone disease:  Lab Results   Component Value Date    MG 2.1 06/08/2024    PHOS 4.6 (H) 06/08/2024     Vit D, 25-Hydroxy   Date Value Ref Range Status   12/05/2023 0.25  Corrected     PTH   Date Value Ref Range Status   01/29/2021 191 (H) 15 - 65 pg/mL Final       No components found for: \"URIC\"    Lab Results   Component Value Date/Time    COLORU Yellow 11/23/2017 10:05 PM    NITRU Negative 11/23/2017 10:05 PM    GLUCOSEU 500 11/23/2017 10:05 PM    GLUCOSEU >=1000 11/21/2010 10:30 PM    KETUA Negative 11/23/2017 10:05 PM    UROBILINOGEN 0.2 11/23/2017 10:05 PM    BILIRUBINUR Negative 11/23/2017 10:05 PM    BILIRUBINUR NEGATIVE 11/21/2010 10:30 PM       No results found for: \"LIPIDPAN\"        IMPRESSION/RECOMMENDATIONS:      ESRD on HD  Outpatient [facility] TTS via left arm AV fistula  Last outpatient dialysis 6/6  Monitor labs   Continue HD TTS while inpatient-plan for next HD 6/10     2.  Anemia with CKD  With concern for GIB  Fecal occult positive  Xray abdomen --> nonobstructive bowel gas pattern with stool scattered throughout the colon, no free intraperitoneal air, no evidence of gastric distension  Hemoglobin target 10-12  Hemoglobin 8.8 s/p transfusion   No VALENTIN as patient is being actively transfused  Transfuse for hemoglobin<7, as per primary service  Monitor H&H  General surgery following  Plan for EGD and colonoscopy on Monday 6/10     3.  Secondary hyperparathyroidism of renal origin   and phosphorus 6.7 on 6/4 in the

## 2024-06-08 NOTE — PROGRESS NOTES
stable  - Monitor hemodynamics, stable  - Pt is not showing any further signs of bleeding and Hgb is increasing  - Pt would benefit from EGD/Cscope, but this can be done as an outpatient  - If pt is still here through the weekend, then will plan for EGD/Cscope Monday 6/10    Electronically signed by Alvin Whitney MD on 6/8/2024 at 6:14 AM    Agree if still here can proceed with scoped Monday but non urgent and can DC from our stand point and scope outpatient   Shira Parker MD

## 2024-06-08 NOTE — CARE COORDINATION
Social Work Discharge Planning;  Discharge order noted. SW to nursing patient has no needs.  Electronically signed by MARCELO Ramirez on 6/8/2024 at 2:47 PM

## 2024-06-08 NOTE — FLOWSHEET NOTE
06/08/24 1110   Vital Signs   BP (!) 150/73   Temp 97.1 °F (36.2 °C)   Pulse 93   Respirations 18   Weight - Scale 64.5 kg (142 lb 3.2 oz)   Weight Method Bed scale   Percent Weight Change -5.01   Pain Assessment   Pain Assessment None - Denies Pain   Post-Hemodialysis Assessment   Post-Treatment Procedures Blood returned;Access bleeding time < 10 minutes   Machine Disinfection Process Exterior Machine Disinfection   Rinseback Volume (ml) 300 ml   Blood Volume Processed (Liters) 95.7 L   Dialyzer Clearance Lightly streaked   Duration of Treatment (minutes) 240 minutes   Heparin Amount Administered During Treatment (mL) 0 mL   Hemodialysis Intake (ml) 300 ml   Hemodialysis Output (ml) 2500 ml   NET Removed (ml) 2200   Patient Response to Treatment tolerated well   Bilateral Breath Sounds Diminished   Edema Facial   Facial Edema +1   Physician Notified No   Time Off 1108   Patient Disposition Return to room   Observations & Evaluations   Level of Consciousness 0   Oriented X 3

## 2024-06-08 NOTE — PLAN OF CARE
Problem: Chronic Conditions and Co-morbidities  Goal: Patient's chronic conditions and co-morbidity symptoms are monitored and maintained or improved  6/8/2024 0004 by Priscilla Kam RN  Outcome: Progressing  6/7/2024 1856 by Yvette Velásquez RN  Outcome: Progressing     Problem: Safety - Adult  Goal: Free from fall injury  6/8/2024 0004 by Priscilla Kam RN  Outcome: Progressing  6/7/2024 1856 by Yvette Velásquez RN  Outcome: Progressing

## 2024-06-08 NOTE — PROGRESS NOTES
[facility] Hospitalist Progress Note      Synopsis: Patient with history of end-stage renal disease, chronic anemia admitted on 6/6/2024 for acute on chronic anemia with reported bright red blood per rectum.  Surgery was consulted and is recommending EGD and colonoscopy, this is planned for Monday.    Subjective  Patient was hopeful to discharge today as she has family responsibilities  Exam:  BP (!) 132/52   Pulse 94   Temp 97.5 °F (36.4 °C) (Temporal)   Resp 17   Ht 1.499 m (4' 11.02\")   Wt 67.9 kg (149 lb 11.2 oz)   SpO2 97%   BMI 30.22 kg/m²   General appearance: No apparent distress, appears stated age and cooperative.  HEENT: Pupils equal, round, and reactive to light. Conjunctivae/corneas clear.  Neck: Supple. No jugular venous distention. Trachea midline.  Respiratory:  Normal respiratory effort. Clear to auscultation, bilaterally without Rales/Wheezes/Rhonchi.  Cardiovascular: Regular rate and rhythm with normal S1/S2 without murmurs, rubs or gallops.  Abdomen: Soft, non-tender, non-distended with normal bowel sounds.  Musculoskeletal: No clubbing, cyanosis or edema bilaterally. Brisk capillary refill. 2+ lower extremity pulses (dorsalis pedis).   Skin:  No rashes    Neurologic: awake, alert and following commands     Medications:  Reviewed    Infusion Medications    sodium chloride      sodium chloride      dextrose       Scheduled Medications    insulin lispro  0-16 Units SubCUTAneous TID     insulin lispro  0-4 Units SubCUTAneous Nightly    sodium chloride flush  5-40 mL IntraVENous 2 times per day    pantoprazole (PROTONIX) 40 mg in sodium chloride (PF) 0.9 % 10 mL injection  40 mg IntraVENous Q12H    Virt-Caps  1 capsule Oral Daily    calcium acetate  4 capsule Oral TID     carvedilol  50 mg Oral QAM    carvedilol  25 mg Oral Dinner    hydrocortisone  25 mg Rectal BID     PRN Meds: albuterol, sodium chloride, sodium chloride flush, sodium chloride, ondansetron **OR** ondansetron, polyethylene  glycol, acetaminophen **OR** acetaminophen, sodium chloride, glucose, dextrose bolus **OR** dextrose bolus, glucagon (rDNA), dextrose    I/O    Intake/Output Summary (Last 24 hours) at 6/8/2024 1108  Last data filed at 6/7/2024 2043  Gross per 24 hour   Intake 120 ml   Output --   Net 120 ml       Labs:   Recent Labs     06/06/24  1857 06/07/24  0356 06/07/24  1136 06/07/24  1442 06/07/24  2251 06/08/24  0647   WBC 4.9  --  6.5  --   --  6.1   HGB 6.4*   < > 9.1* 8.8* 9.4* 8.8*  8.8*   HCT 19.6*   < > 28.3* 27.7* 29.0* 27.3*  26.6*     --  227  --   --  216    < > = values in this interval not displayed.         Recent Labs     06/06/24  1857 06/07/24  1019 06/08/24  0647    133 134   K 3.2* 4.7 4.4   CL 96* 94* 96*   CO2 30* 25 23   BUN 16 32* 49*   CREATININE 2.1* 3.7* 5.7*   CALCIUM 8.7 9.1 9.0   PHOS  --   --  4.6*         No results for input(s): \"PROT\", \"ALKPHOS\", \"ALT\", \"AST\", \"BILITOT\", \"AMYLASE\", \"LIPASE\" in the last 72 hours.    Invalid input(s): \"ALB\"    No results for input(s): \"INR\" in the last 72 hours.    No results for input(s): \"CKTOTAL\", \"TROPONINI\" in the last 72 hours.    Chronic labs:  Lab Results   Component Value Date    CHOL 155 10/08/2013    TRIG 68 10/08/2013    HDL 40 01/29/2021    TSH 2.900 03/24/2023    INR 1.0 01/30/2024    LABA1C 6.0 (H) 06/06/2024       Radiology:  Imaging studies reviewed today.    ASSESSMENT:    Principal Problem:    GI bleed  Active Problems:    Chronic systolic (congestive) heart failure    Poorly controlled type 1 diabetes mellitus (HCC)    HTN (hypertension), benign    Symptomatic anemia    Hypotension    ESRD on hemodialysis (HCC)    Chronic hypoxic respiratory failure, on home oxygen therapy (HCC)    Acute on chronic anemia  Resolved Problems:    * No resolved hospital problems. *       PLAN:  Discussed with patient that while I understand her responsibilities to her family, medically she is not yet stable to discharge.  Discussed risks of

## 2024-06-10 ENCOUNTER — TELEPHONE (OUTPATIENT)
Dept: SURGERY | Age: 39
End: 2024-06-10

## 2024-06-10 ENCOUNTER — CARE COORDINATION (OUTPATIENT)
Dept: CARE COORDINATION | Age: 39
End: 2024-06-10

## 2024-06-10 NOTE — TELEPHONE ENCOUNTER
Assessment/Plan:  38 y.o. female with acute on chronic anemia with reported BRPBR, resolved     - Continue regular diet as tolerated  - Monitor Hgb, transfuse as needed, Hgb has been stable  - Monitor hemodynamics, stable  - Pt is not showing any further signs of bleeding and Hgb is increasing  - Pt would benefit from EGD/Cscope, but this can be done as an outpatient  - If pt is still here through the weekend, then will plan for EGD/Cscope Monday 6/10    Patient discharged 6/9/24. Patient calling to schedule hospital f/u for outpatient EGD/Colon. Please advise patient [phone]. Or  [phone]    O-T Plasty Text: The defect edges were debeveled with a #15 scalpel blade.  Given the location of the defect, shape of the defect and the proximity to free margins an O-T plasty was deemed most appropriate.  Using a sterile surgical marker, an appropriate O-T plasty was drawn incorporating the defect and placing the expected incisions within the relaxed skin tension lines where possible.    The area thus outlined was incised deep to adipose tissue with a #15 scalpel blade.  The skin margins were undermined to an appropriate distance in all directions utilizing iris scissors.

## 2024-06-10 NOTE — TELEPHONE ENCOUNTER
KAYLYNN spoke with Dr Parker regarding follow up after hospital stay. Per Dr Parker, patient does not need to be seen in office, can just schedule outpatient EGD/Colonoscopy.    LPN will call to reschedule.    Electronically signed by Judson De León LPN on 6/10/24 at 1:38 PM EDT

## 2024-06-10 NOTE — CARE COORDINATION
Care Transitions Note  Initial Call - Call within 2 business days of discharge: Yes    First attempt to reach the patient for initial Care Transition call post hospital discharge. HIPAA compliant message left with CTN's contact information requesting return phone call.      Patient: Michelle Nettles    Patient : 1985   MRN: [id number]    Reason for Admission: anemia   Discharge Date: 24  RURS: Readmission Risk Score: 21.3    Last Discharge Facility       Date Complaint Diagnosis Description Type Department Provider    24 Abnormal Lab Anemia, unspecified type ... ED to Hosp-Admission (Discharged) (ADMITTED) [facility] Abbey Olivares MD; Sarah Sevilla, ...     Follow Up Appointment:   Patient has hospital follow up appointment scheduled within 7 days of discharge.    Future Appointments         Provider Specialty Dept Phone    2024 11:00 AM Rogers Rodríguez MD Primary Care [phone]    2024 11:30 AM Brennan Dyer MD Endocrinology [phone]        Sarah Yin RN

## 2024-06-11 ENCOUNTER — TELEPHONE (OUTPATIENT)
Dept: SURGERY | Age: 39
End: 2024-06-11

## 2024-06-11 ENCOUNTER — CARE COORDINATION (OUTPATIENT)
Dept: CARE COORDINATION | Age: 39
End: 2024-06-11

## 2024-06-11 NOTE — CARE COORDINATION
Care Transitions Note  Initial Call - Call within 2 business days of discharge: Yes    Second and final attempt to reach the patient for initial Care Transition call post hospital discharge. HIPAA compliant message left with CTN's contact information requesting return phone call.   If no return call by the end of today, CTN will sign off and resolve CT program.     Patient: Michelle Nettles    Patient : 1985   MRN: [id number]    Reason for Admission: anemia   Discharge Date: 24  RURS: Readmission Risk Score: 21.3    Last Discharge Facility       Date Complaint Diagnosis Description Type Department Provider    24 Abnormal Lab Anemia, unspecified type ... ED to Hosp-Admission (Discharged) (ADMITTED) [facility] Abbey Olivares MD; Sarah Sevilla, ...   Follow Up Appointment:     Future Appointments         Provider Specialty Dept Phone    2024 11:00 AM Rogers Rodríguez MD Primary Care [phone]    2024 11:30 AM Brennan Dyer MD Endocrinology [phone]        Sarah Yin RN

## 2024-06-11 NOTE — TELEPHONE ENCOUNTER
Pt called in to sridevi a scope, she was admitted into the hospital on 6/6/24 and released on 6/8/24, she has a GI bleed. Does pt need a consult first? Is first available appropriate? Please, advise. Michelle can be reached at [phone] or [phone]. Thank you.

## 2024-06-11 NOTE — TELEPHONE ENCOUNTER
LPN called and spoke with patient regarding outpatient EGD/Colonoscopy. Due to dialysis schedule being Tues, Thurs, Sat will need to find an available day. LPN instructed will call patient back with day and time.    Patient voiced understanding.    Electronically signed by Judson De León LPN on 6/11/24 at 12:06 PM EDT

## 2024-06-12 DIAGNOSIS — K92.2 GASTROINTESTINAL HEMORRHAGE, UNSPECIFIED GASTROINTESTINAL HEMORRHAGE TYPE: Primary | ICD-10-CM

## 2024-06-12 RX ORDER — POLYETHYLENE GLYCOL 3350, SODIUM SULFATE ANHYDROUS, SODIUM BICARBONATE, SODIUM CHLORIDE, POTASSIUM CHLORIDE 236; 22.74; 6.74; 5.86; 2.97 G/4L; G/4L; G/4L; G/4L; G/4L
4 POWDER, FOR SOLUTION ORAL ONCE
Qty: 4000 ML | Refills: 0 | Status: SHIPPED | OUTPATIENT
Start: 2024-06-12 | End: 2024-06-12

## 2024-07-01 RX ORDER — FAMOTIDINE 20 MG/1
20 TABLET, FILM COATED ORAL EVERY MORNING
Qty: 30 TABLET | Refills: 0 | Status: SHIPPED | OUTPATIENT
Start: 2024-07-01

## 2024-07-01 RX ORDER — FAMOTIDINE 20 MG/1
20 TABLET, FILM COATED ORAL EVERY MORNING
Qty: 90 TABLET | OUTPATIENT
Start: 2024-07-01

## 2024-07-01 NOTE — TELEPHONE ENCOUNTER
Last Appointment:  Visit date not found  Future Appointments   Date Time Provider Department Center   7/10/2024 11:00 AM Rogers Rodríguez MD [facility]   11/25/2024 11:30 AM Brennan Dyer MD [facility]

## 2024-07-07 SDOH — HEALTH STABILITY: PHYSICAL HEALTH
ON AVERAGE, HOW MANY DAYS PER WEEK DO YOU ENGAGE IN MODERATE TO STRENUOUS EXERCISE (LIKE A BRISK WALK)?: PATIENT DECLINED

## 2024-07-07 SDOH — HEALTH STABILITY: PHYSICAL HEALTH: ON AVERAGE, HOW MANY MINUTES DO YOU ENGAGE IN EXERCISE AT THIS LEVEL?: 0 MIN

## 2024-07-10 ENCOUNTER — HOSPITAL ENCOUNTER (EMERGENCY)
Age: 39
Discharge: HOME | End: 2024-07-10
Attending: EMERGENCY MEDICINE
Payer: MEDICARE

## 2024-07-10 VITALS
HEART RATE: 90 BPM | SYSTOLIC BLOOD PRESSURE: 120 MMHG | OXYGEN SATURATION: 100 % | RESPIRATION RATE: 18 BRPM | DIASTOLIC BLOOD PRESSURE: 70 MMHG | TEMPERATURE: 97.8 F

## 2024-07-10 DIAGNOSIS — Z99.2 ESRD ON HEMODIALYSIS (HCC): ICD-10-CM

## 2024-07-10 DIAGNOSIS — E16.2 HYPOGLYCEMIA: Primary | ICD-10-CM

## 2024-07-10 DIAGNOSIS — N18.6 ESRD ON HEMODIALYSIS (HCC): ICD-10-CM

## 2024-07-10 LAB
ANION GAP SERPL CALCULATED.3IONS-SCNC: 12 MMOL/L (ref 7–16)
BASOPHILS # BLD: 0.02 K/UL (ref 0–0.2)
BASOPHILS NFR BLD: 0 % (ref 0–2)
BUN SERPL-MCNC: 33 MG/DL (ref 6–20)
CALCIUM SERPL-MCNC: 8.9 MG/DL (ref 8.6–10.2)
CHLORIDE SERPL-SCNC: 97 MMOL/L (ref 98–107)
CO2 SERPL-SCNC: 32 MMOL/L (ref 22–29)
CREAT SERPL-MCNC: 8.3 MG/DL (ref 0.5–1)
EOSINOPHIL # BLD: 0.17 K/UL (ref 0.05–0.5)
EOSINOPHILS RELATIVE PERCENT: 4 % (ref 0–6)
ERYTHROCYTE [DISTWIDTH] IN BLOOD BY AUTOMATED COUNT: 15.9 % (ref 11.5–15)
GFR, ESTIMATED: 6 ML/MIN/1.73M2
GLUCOSE BLD-MCNC: 100 MG/DL (ref 74–99)
GLUCOSE BLD-MCNC: 100 MG/DL (ref 74–99)
GLUCOSE BLD-MCNC: 230 MG/DL (ref 74–99)
GLUCOSE SERPL-MCNC: 118 MG/DL (ref 74–99)
HCT VFR BLD AUTO: 24.1 % (ref 34–48)
HGB BLD-MCNC: 8 G/DL (ref 11.5–15.5)
IMM GRANULOCYTES # BLD AUTO: 0.04 K/UL (ref 0–0.58)
IMM GRANULOCYTES NFR BLD: 1 % (ref 0–5)
LYMPHOCYTES NFR BLD: 0.78 K/UL (ref 1.5–4)
LYMPHOCYTES RELATIVE PERCENT: 16 % (ref 20–42)
MCH RBC QN AUTO: 31.3 PG (ref 26–35)
MCHC RBC AUTO-ENTMCNC: 33.2 G/DL (ref 32–34.5)
MCV RBC AUTO: 94.1 FL (ref 80–99.9)
MONOCYTES NFR BLD: 0.48 K/UL (ref 0.1–0.95)
MONOCYTES NFR BLD: 10 % (ref 2–12)
NEUTROPHILS NFR BLD: 70 % (ref 43–80)
NEUTS SEG NFR BLD: 3.43 K/UL (ref 1.8–7.3)
PLATELET # BLD AUTO: 209 K/UL (ref 130–450)
PMV BLD AUTO: 9.8 FL (ref 7–12)
POTASSIUM SERPL-SCNC: 4 MMOL/L (ref 3.5–5)
RBC # BLD AUTO: 2.56 M/UL (ref 3.5–5.5)
SODIUM SERPL-SCNC: 141 MMOL/L (ref 132–146)
WBC OTHER # BLD: 4.9 K/UL (ref 4.5–11.5)

## 2024-07-10 PROCEDURE — 82962 GLUCOSE BLOOD TEST: CPT

## 2024-07-10 PROCEDURE — 99284 EMERGENCY DEPT VISIT MOD MDM: CPT

## 2024-07-10 PROCEDURE — 80048 BASIC METABOLIC PNL TOTAL CA: CPT

## 2024-07-10 PROCEDURE — 85025 COMPLETE CBC W/AUTO DIFF WBC: CPT

## 2024-07-10 NOTE — ED PROVIDER NOTES
ED PROVIDER NOTE    Chief Complaint   Patient presents with    Hypoglycemia     BGL 54 @ dialysis, per EMS patient took insulin before going but did not eat. Last  for EMS ,  BGL here 100       HPI:  7/10/24,   Time: 1:16 PM EDT       Michelle Nettles is a 39 y.o. female presenting to the ED for hypoglycemia.  Acute onset this morning shortly prior to arrival.  Patient took her 12 units of Lantus last night and 8 units of Humalog this morning.  She had a small breakfast and then went to dialysis.  She is getting extra sessions of dialysis and had a full treatment yesterday.  She does not make urine.  When she got to dialysis her blood glucose was low in the 50s.  She received glucagon from EMS.  On arrival her blood glucose is 100.  She denies any recent illness, fever, chills, cough, chest pain, shortness of breath, abdominal pain, vomiting, diarrhea.    Chart review: hx of ESRD on HD, LUE AVF, HTN, DM1    Reviewed internal medicine discharge summary from 6/18/2024 by Dr. Olivares:  Patient was admitted for acute on chronic anemia with reported bright red blood per rectum.  Surgery was consulted, recommended EGD and colonoscopy.  Hemoglobin stabilized therefore patient elected to have this done as an outpatient.      Review of Systems:     Review of Systems  Pertinent positives and negatives as stated in HPI     --------------------------------------------- PAST HISTORY ---------------------------------------------  Past Medical History:   Past Medical History:   Diagnosis Date    JOLLY (acute kidney injury) ([facility]) 4/5/2016    AVF (arteriovenous fistula) ([facility]) 02/19/2018    let arm    Chronic kidney disease     Dialysis AV fistula malfunction, initial encounter ([facility]) 11/7/2019    DM type 1 (diabetes mellitus, type 1) ([facility])     Encounter regarding vascular access for dialysis for ESRD ([facility]) 07/12/2018    ESRD (end stage renal disease) ([facility])     Hemodialysis patient ([facility])     amrita eunice mon wed fri /

## 2024-07-16 ENCOUNTER — HOSPITAL ENCOUNTER (INPATIENT)
Age: 39
LOS: 2 days | Discharge: HOME | DRG: 811 | End: 2024-07-19
Attending: EMERGENCY MEDICINE | Admitting: INTERNAL MEDICINE
Payer: MEDICARE

## 2024-07-16 ENCOUNTER — APPOINTMENT (OUTPATIENT)
Dept: GENERAL RADIOLOGY | Age: 39
DRG: 811 | End: 2024-07-16
Payer: MEDICARE

## 2024-07-16 DIAGNOSIS — E16.2 HYPOGLYCEMIA: Primary | ICD-10-CM

## 2024-07-16 DIAGNOSIS — N18.6 STAGE 5 CHRONIC KIDNEY DISEASE ON CHRONIC DIALYSIS (HCC): ICD-10-CM

## 2024-07-16 DIAGNOSIS — Z99.2 STAGE 5 CHRONIC KIDNEY DISEASE ON CHRONIC DIALYSIS (HCC): ICD-10-CM

## 2024-07-16 DIAGNOSIS — R06.02 SOB (SHORTNESS OF BREATH): ICD-10-CM

## 2024-07-16 DIAGNOSIS — K92.1 MELENA: ICD-10-CM

## 2024-07-16 DIAGNOSIS — R65.10 SIRS (SYSTEMIC INFLAMMATORY RESPONSE SYNDROME) (HCC): ICD-10-CM

## 2024-07-16 LAB
ALBUMIN SERPL-MCNC: 3.7 G/DL (ref 3.5–5.2)
ALP SERPL-CCNC: 100 U/L (ref 35–104)
ALT SERPL-CCNC: 11 U/L (ref 0–32)
ANION GAP SERPL CALCULATED.3IONS-SCNC: 15 MMOL/L (ref 7–16)
AST SERPL-CCNC: 16 U/L (ref 0–31)
BILIRUB SERPL-MCNC: 0.2 MG/DL (ref 0–1.2)
BUN SERPL-MCNC: 53 MG/DL (ref 6–20)
CALCIUM SERPL-MCNC: 9.5 MG/DL (ref 8.6–10.2)
CHLORIDE SERPL-SCNC: 97 MMOL/L (ref 98–107)
CO2 SERPL-SCNC: 28 MMOL/L (ref 22–29)
CREAT SERPL-MCNC: 9.4 MG/DL (ref 0.5–1)
ERYTHROCYTE [DISTWIDTH] IN BLOOD BY AUTOMATED COUNT: 15.6 % (ref 11.5–15)
GFR, ESTIMATED: 5 ML/MIN/1.73M2
GLUCOSE BLD-MCNC: 111 MG/DL (ref 74–99)
GLUCOSE BLD-MCNC: 124 MG/DL (ref 74–99)
GLUCOSE BLD-MCNC: 61 MG/DL (ref 74–99)
GLUCOSE BLD-MCNC: <40 MG/DL (ref 74–99)
GLUCOSE BLD-MCNC: <40 MG/DL (ref 74–99)
GLUCOSE SERPL-MCNC: 27 MG/DL (ref 74–99)
HCT VFR BLD AUTO: 24.4 % (ref 34–48)
HGB BLD-MCNC: 7.9 G/DL (ref 11.5–15.5)
MAGNESIUM SERPL-MCNC: 2.5 MG/DL (ref 1.6–2.6)
MCH RBC QN AUTO: 29.9 PG (ref 26–35)
MCHC RBC AUTO-ENTMCNC: 32.4 G/DL (ref 32–34.5)
MCV RBC AUTO: 92.4 FL (ref 80–99.9)
PHOSPHATE SERPL-MCNC: 4.7 MG/DL (ref 2.5–4.5)
PLATELET # BLD AUTO: 318 K/UL (ref 130–450)
PMV BLD AUTO: 9.8 FL (ref 7–12)
POTASSIUM SERPL-SCNC: 3.3 MMOL/L (ref 3.5–5)
PROT SERPL-MCNC: 8 G/DL (ref 6.4–8.3)
RBC # BLD AUTO: 2.64 M/UL (ref 3.5–5.5)
SODIUM SERPL-SCNC: 140 MMOL/L (ref 132–146)
TROPONIN I SERPL HS-MCNC: 128 NG/L (ref 0–9)
WBC OTHER # BLD: 6.4 K/UL (ref 4.5–11.5)

## 2024-07-16 PROCEDURE — 86900 BLOOD TYPING SEROLOGIC ABO: CPT

## 2024-07-16 PROCEDURE — P9016 RBC LEUKOCYTES REDUCED: HCPCS

## 2024-07-16 PROCEDURE — 83735 ASSAY OF MAGNESIUM: CPT

## 2024-07-16 PROCEDURE — 86901 BLOOD TYPING SEROLOGIC RH(D): CPT

## 2024-07-16 PROCEDURE — 93005 ELECTROCARDIOGRAM TRACING: CPT | Performed by: EMERGENCY MEDICINE

## 2024-07-16 PROCEDURE — 36430 TRANSFUSION BLD/BLD COMPNT: CPT

## 2024-07-16 PROCEDURE — 87040 BLOOD CULTURE FOR BACTERIA: CPT

## 2024-07-16 PROCEDURE — 84100 ASSAY OF PHOSPHORUS: CPT

## 2024-07-16 PROCEDURE — 82962 GLUCOSE BLOOD TEST: CPT

## 2024-07-16 PROCEDURE — 30233N1 TRANSFUSION OF NONAUTOLOGOUS RED BLOOD CELLS INTO PERIPHERAL VEIN, PERCUTANEOUS APPROACH: ICD-10-PCS | Performed by: STUDENT IN AN ORGANIZED HEALTH CARE EDUCATION/TRAINING PROGRAM

## 2024-07-16 PROCEDURE — 87154 CUL TYP ID BLD PTHGN 6+ TRGT: CPT

## 2024-07-16 PROCEDURE — 86923 COMPATIBILITY TEST ELECTRIC: CPT

## 2024-07-16 PROCEDURE — 99285 EMERGENCY DEPT VISIT HI MDM: CPT

## 2024-07-16 PROCEDURE — 71045 X-RAY EXAM CHEST 1 VIEW: CPT

## 2024-07-16 PROCEDURE — 84484 ASSAY OF TROPONIN QUANT: CPT

## 2024-07-16 PROCEDURE — 96365 THER/PROPH/DIAG IV INF INIT: CPT

## 2024-07-16 PROCEDURE — 2580000003 HC RX 258: Performed by: EMERGENCY MEDICINE

## 2024-07-16 PROCEDURE — 86850 RBC ANTIBODY SCREEN: CPT

## 2024-07-16 PROCEDURE — 2500000003 HC RX 250 WO HCPCS: Performed by: EMERGENCY MEDICINE

## 2024-07-16 PROCEDURE — 85027 COMPLETE CBC AUTOMATED: CPT

## 2024-07-16 PROCEDURE — 80053 COMPREHEN METABOLIC PANEL: CPT

## 2024-07-16 RX ORDER — DEXTROSE MONOHYDRATE 25 G/50ML
25 INJECTION, SOLUTION INTRAVENOUS ONCE
Status: COMPLETED | OUTPATIENT
Start: 2024-07-16 | End: 2024-07-16

## 2024-07-16 RX ORDER — SODIUM CHLORIDE 9 MG/ML
INJECTION, SOLUTION INTRAVENOUS PRN
Status: DISCONTINUED | OUTPATIENT
Start: 2024-07-16 | End: 2024-07-19 | Stop reason: HOSPADM

## 2024-07-16 RX ORDER — DEXTROSE MONOHYDRATE AND SODIUM CHLORIDE 5; .45 G/100ML; G/100ML
INJECTION, SOLUTION INTRAVENOUS CONTINUOUS
Status: DISCONTINUED | OUTPATIENT
Start: 2024-07-16 | End: 2024-07-19 | Stop reason: HOSPADM

## 2024-07-16 RX ORDER — POTASSIUM CHLORIDE 20 MEQ/1
40 TABLET, EXTENDED RELEASE ORAL ONCE
Status: COMPLETED | OUTPATIENT
Start: 2024-07-16 | End: 2024-07-17

## 2024-07-16 RX ADMIN — DEXTROSE MONOHYDRATE 25 G: 25 INJECTION, SOLUTION INTRAVENOUS at 20:32

## 2024-07-16 RX ADMIN — DEXTROSE AND SODIUM CHLORIDE: 5; 450 INJECTION, SOLUTION INTRAVENOUS at 21:01

## 2024-07-17 PROBLEM — E16.2 HYPOGLYCEMIA: Status: ACTIVE | Noted: 2024-07-17

## 2024-07-17 PROBLEM — D50.0 NORMOCYTIC ANEMIA DUE TO BLOOD LOSS: Status: ACTIVE | Noted: 2020-07-14

## 2024-07-17 LAB
ANION GAP SERPL CALCULATED.3IONS-SCNC: 10 MMOL/L (ref 7–16)
ANION GAP SERPL CALCULATED.3IONS-SCNC: 10 MMOL/L (ref 7–16)
BASOPHILS # BLD: 0.02 K/UL (ref 0–0.2)
BASOPHILS NFR BLD: 0 % (ref 0–2)
BUN SERPL-MCNC: 15 MG/DL (ref 6–20)
BUN SERPL-MCNC: 18 MG/DL (ref 6–20)
CALCIUM SERPL-MCNC: 8.1 MG/DL (ref 8.6–10.2)
CALCIUM SERPL-MCNC: 8.2 MG/DL (ref 8.6–10.2)
CHLORIDE SERPL-SCNC: 94 MMOL/L (ref 98–107)
CHLORIDE SERPL-SCNC: 95 MMOL/L (ref 98–107)
CO2 SERPL-SCNC: 27 MMOL/L (ref 22–29)
CO2 SERPL-SCNC: 29 MMOL/L (ref 22–29)
CREAT SERPL-MCNC: 4 MG/DL (ref 0.5–1)
CREAT SERPL-MCNC: 4.3 MG/DL (ref 0.5–1)
EKG ATRIAL RATE: 101 BPM
EKG P AXIS: 55 DEGREES
EKG P-R INTERVAL: 126 MS
EKG Q-T INTERVAL: 346 MS
EKG QRS DURATION: 70 MS
EKG QTC CALCULATION (BAZETT): 448 MS
EKG R AXIS: 10 DEGREES
EKG T AXIS: 110 DEGREES
EKG VENTRICULAR RATE: 101 BPM
EOSINOPHIL # BLD: 0.16 K/UL (ref 0.05–0.5)
EOSINOPHILS RELATIVE PERCENT: 3 % (ref 0–6)
ERYTHROCYTE [DISTWIDTH] IN BLOOD BY AUTOMATED COUNT: 15.9 % (ref 11.5–15)
GFR, ESTIMATED: 13 ML/MIN/1.73M2
GFR, ESTIMATED: 14 ML/MIN/1.73M2
GLUCOSE BLD-MCNC: 195 MG/DL (ref 74–99)
GLUCOSE BLD-MCNC: 236 MG/DL (ref 74–99)
GLUCOSE BLD-MCNC: 258 MG/DL (ref 74–99)
GLUCOSE BLD-MCNC: 296 MG/DL (ref 74–99)
GLUCOSE BLD-MCNC: 402 MG/DL (ref 74–99)
GLUCOSE BLD-MCNC: 492 MG/DL (ref 74–99)
GLUCOSE BLD-MCNC: 498 MG/DL (ref 74–99)
GLUCOSE BLD-MCNC: >500 MG/DL (ref 74–99)
GLUCOSE SERPL-MCNC: 288 MG/DL (ref 74–99)
GLUCOSE SERPL-MCNC: 436 MG/DL (ref 74–99)
HCT VFR BLD AUTO: 22.5 % (ref 34–48)
HCT VFR BLD AUTO: 24.6 % (ref 34–48)
HGB BLD-MCNC: 7.4 G/DL (ref 11.5–15.5)
HGB BLD-MCNC: 8.1 G/DL (ref 11.5–15.5)
IMM GRANULOCYTES # BLD AUTO: 0.03 K/UL (ref 0–0.58)
IMM GRANULOCYTES NFR BLD: 1 % (ref 0–5)
LACTATE BLDV-SCNC: 1.1 MMOL/L (ref 0.5–2.2)
LYMPHOCYTES NFR BLD: 1.05 K/UL (ref 1.5–4)
LYMPHOCYTES RELATIVE PERCENT: 18 % (ref 20–42)
MCH RBC QN AUTO: 30.3 PG (ref 26–35)
MCHC RBC AUTO-ENTMCNC: 32.9 G/DL (ref 32–34.5)
MCV RBC AUTO: 92.1 FL (ref 80–99.9)
MONOCYTES NFR BLD: 0.5 K/UL (ref 0.1–0.95)
MONOCYTES NFR BLD: 9 % (ref 2–12)
NEUTROPHILS NFR BLD: 70 % (ref 43–80)
NEUTS SEG NFR BLD: 4.07 K/UL (ref 1.8–7.3)
PLATELET # BLD AUTO: 266 K/UL (ref 130–450)
PMV BLD AUTO: 9.9 FL (ref 7–12)
POTASSIUM SERPL-SCNC: 4.2 MMOL/L (ref 3.5–5)
POTASSIUM SERPL-SCNC: 5 MMOL/L (ref 3.5–5)
RBC # BLD AUTO: 2.67 M/UL (ref 3.5–5.5)
SODIUM SERPL-SCNC: 132 MMOL/L (ref 132–146)
SODIUM SERPL-SCNC: 133 MMOL/L (ref 132–146)
WBC OTHER # BLD: 5.8 K/UL (ref 4.5–11.5)

## 2024-07-17 PROCEDURE — 6370000000 HC RX 637 (ALT 250 FOR IP): Performed by: INTERNAL MEDICINE

## 2024-07-17 PROCEDURE — 90935 HEMODIALYSIS ONE EVALUATION: CPT

## 2024-07-17 PROCEDURE — 6370000000 HC RX 637 (ALT 250 FOR IP): Performed by: STUDENT IN AN ORGANIZED HEALTH CARE EDUCATION/TRAINING PROGRAM

## 2024-07-17 PROCEDURE — 2580000003 HC RX 258: Performed by: EMERGENCY MEDICINE

## 2024-07-17 PROCEDURE — 80048 BASIC METABOLIC PNL TOTAL CA: CPT

## 2024-07-17 PROCEDURE — 2060000000 HC ICU INTERMEDIATE R&B

## 2024-07-17 PROCEDURE — 82962 GLUCOSE BLOOD TEST: CPT

## 2024-07-17 PROCEDURE — 83605 ASSAY OF LACTIC ACID: CPT

## 2024-07-17 PROCEDURE — 6360000002 HC RX W HCPCS: Performed by: EMERGENCY MEDICINE

## 2024-07-17 PROCEDURE — 85025 COMPLETE CBC W/AUTO DIFF WBC: CPT

## 2024-07-17 PROCEDURE — 2500000003 HC RX 250 WO HCPCS: Performed by: INTERNAL MEDICINE

## 2024-07-17 PROCEDURE — 6370000000 HC RX 637 (ALT 250 FOR IP): Performed by: EMERGENCY MEDICINE

## 2024-07-17 PROCEDURE — 36415 COLL VENOUS BLD VENIPUNCTURE: CPT

## 2024-07-17 PROCEDURE — 85014 HEMATOCRIT: CPT

## 2024-07-17 PROCEDURE — 5A1D70Z PERFORMANCE OF URINARY FILTRATION, INTERMITTENT, LESS THAN 6 HOURS PER DAY: ICD-10-PCS | Performed by: INTERNAL MEDICINE

## 2024-07-17 PROCEDURE — 6360000002 HC RX W HCPCS: Performed by: INTERNAL MEDICINE

## 2024-07-17 PROCEDURE — 2580000003 HC RX 258: Performed by: INTERNAL MEDICINE

## 2024-07-17 PROCEDURE — 99223 1ST HOSP IP/OBS HIGH 75: CPT | Performed by: INTERNAL MEDICINE

## 2024-07-17 PROCEDURE — 93010 ELECTROCARDIOGRAM REPORT: CPT | Performed by: INTERNAL MEDICINE

## 2024-07-17 PROCEDURE — 85018 HEMOGLOBIN: CPT

## 2024-07-17 RX ORDER — POLYETHYLENE GLYCOL 3350 17 G/17G
17 POWDER, FOR SOLUTION ORAL DAILY PRN
Status: DISCONTINUED | OUTPATIENT
Start: 2024-07-17 | End: 2024-07-19 | Stop reason: HOSPADM

## 2024-07-17 RX ORDER — ONDANSETRON 2 MG/ML
4 INJECTION INTRAMUSCULAR; INTRAVENOUS EVERY 6 HOURS PRN
Status: DISCONTINUED | OUTPATIENT
Start: 2024-07-17 | End: 2024-07-19 | Stop reason: HOSPADM

## 2024-07-17 RX ORDER — INSULIN LISPRO 100 [IU]/ML
0-4 INJECTION, SOLUTION INTRAVENOUS; SUBCUTANEOUS
Status: DISCONTINUED | OUTPATIENT
Start: 2024-07-17 | End: 2024-07-17

## 2024-07-17 RX ORDER — HYDRALAZINE HYDROCHLORIDE 20 MG/ML
10 INJECTION INTRAMUSCULAR; INTRAVENOUS EVERY 6 HOURS PRN
Status: DISCONTINUED | OUTPATIENT
Start: 2024-07-17 | End: 2024-07-19 | Stop reason: HOSPADM

## 2024-07-17 RX ORDER — HEPARIN SODIUM 5000 [USP'U]/ML
5000 INJECTION, SOLUTION INTRAVENOUS; SUBCUTANEOUS EVERY 8 HOURS SCHEDULED
Status: DISCONTINUED | OUTPATIENT
Start: 2024-07-17 | End: 2024-07-19 | Stop reason: HOSPADM

## 2024-07-17 RX ORDER — INSULIN GLARGINE 100 [IU]/ML
8 INJECTION, SOLUTION SUBCUTANEOUS NIGHTLY
Status: DISCONTINUED | OUTPATIENT
Start: 2024-07-17 | End: 2024-07-19 | Stop reason: HOSPADM

## 2024-07-17 RX ORDER — CARVEDILOL 25 MG/1
25 TABLET ORAL DAILY
Status: DISCONTINUED | OUTPATIENT
Start: 2024-07-17 | End: 2024-07-19 | Stop reason: HOSPADM

## 2024-07-17 RX ORDER — SODIUM CHLORIDE 9 MG/ML
INJECTION, SOLUTION INTRAVENOUS PRN
Status: DISCONTINUED | OUTPATIENT
Start: 2024-07-17 | End: 2024-07-19 | Stop reason: HOSPADM

## 2024-07-17 RX ORDER — CALCIUM ACETATE 667 MG/1
4 CAPSULE ORAL
Status: DISCONTINUED | OUTPATIENT
Start: 2024-07-17 | End: 2024-07-19 | Stop reason: HOSPADM

## 2024-07-17 RX ORDER — NICOTINE POLACRILEX 4 MG
15 LOZENGE BUCCAL PRN
COMMUNITY

## 2024-07-17 RX ORDER — GLUCAGON 1 MG/ML
1 KIT INJECTION PRN
Status: DISCONTINUED | OUTPATIENT
Start: 2024-07-17 | End: 2024-07-19 | Stop reason: HOSPADM

## 2024-07-17 RX ORDER — ACETAMINOPHEN 500 MG
500 TABLET ORAL EVERY 6 HOURS PRN
COMMUNITY

## 2024-07-17 RX ORDER — INSULIN GLARGINE 300 U/ML
8 INJECTION, SOLUTION SUBCUTANEOUS SEE ADMIN INSTRUCTIONS
Status: ON HOLD | COMMUNITY
End: 2024-07-19 | Stop reason: HOSPADM

## 2024-07-17 RX ORDER — SODIUM CHLORIDE 0.9 % (FLUSH) 0.9 %
5-40 SYRINGE (ML) INJECTION PRN
Status: DISCONTINUED | OUTPATIENT
Start: 2024-07-17 | End: 2024-07-19 | Stop reason: HOSPADM

## 2024-07-17 RX ORDER — INSULIN LISPRO 100 [IU]/ML
10 INJECTION, SOLUTION INTRAVENOUS; SUBCUTANEOUS ONCE
Status: COMPLETED | OUTPATIENT
Start: 2024-07-17 | End: 2024-07-17

## 2024-07-17 RX ORDER — FAMOTIDINE 20 MG/1
20 TABLET, FILM COATED ORAL EVERY MORNING
Status: DISCONTINUED | OUTPATIENT
Start: 2024-07-17 | End: 2024-07-18

## 2024-07-17 RX ORDER — ACETAMINOPHEN 650 MG/1
650 SUPPOSITORY RECTAL EVERY 6 HOURS PRN
Status: DISCONTINUED | OUTPATIENT
Start: 2024-07-17 | End: 2024-07-19 | Stop reason: HOSPADM

## 2024-07-17 RX ORDER — INSULIN LISPRO 100 [IU]/ML
0-8 INJECTION, SOLUTION INTRAVENOUS; SUBCUTANEOUS
Status: DISCONTINUED | OUTPATIENT
Start: 2024-07-17 | End: 2024-07-19 | Stop reason: HOSPADM

## 2024-07-17 RX ORDER — INSULIN LISPRO 100 [IU]/ML
0-4 INJECTION, SOLUTION INTRAVENOUS; SUBCUTANEOUS NIGHTLY
Status: DISCONTINUED | OUTPATIENT
Start: 2024-07-17 | End: 2024-07-17

## 2024-07-17 RX ORDER — INSULIN GLARGINE 300 U/ML
12 INJECTION, SOLUTION SUBCUTANEOUS SEE ADMIN INSTRUCTIONS
Status: ON HOLD | COMMUNITY
End: 2024-07-19

## 2024-07-17 RX ORDER — CLOPIDOGREL BISULFATE 75 MG/1
75 TABLET ORAL DAILY
COMMUNITY

## 2024-07-17 RX ORDER — CLINDAMYCIN PHOSPHATE 10 UG/ML
LOTION TOPICAL NIGHTLY
COMMUNITY

## 2024-07-17 RX ORDER — DEXTROSE MONOHYDRATE 100 MG/ML
INJECTION, SOLUTION INTRAVENOUS CONTINUOUS PRN
Status: DISCONTINUED | OUTPATIENT
Start: 2024-07-17 | End: 2024-07-19 | Stop reason: HOSPADM

## 2024-07-17 RX ORDER — CLOPIDOGREL BISULFATE 75 MG/1
75 TABLET ORAL DAILY
Status: DISCONTINUED | OUTPATIENT
Start: 2024-07-17 | End: 2024-07-19 | Stop reason: HOSPADM

## 2024-07-17 RX ORDER — ONDANSETRON 4 MG/1
4 TABLET, ORALLY DISINTEGRATING ORAL EVERY 8 HOURS PRN
Status: DISCONTINUED | OUTPATIENT
Start: 2024-07-17 | End: 2024-07-19 | Stop reason: HOSPADM

## 2024-07-17 RX ORDER — INSULIN LISPRO 100 [IU]/ML
0-4 INJECTION, SOLUTION INTRAVENOUS; SUBCUTANEOUS NIGHTLY
Status: DISCONTINUED | OUTPATIENT
Start: 2024-07-17 | End: 2024-07-19 | Stop reason: HOSPADM

## 2024-07-17 RX ORDER — ALBUTEROL SULFATE 90 UG/1
2 AEROSOL, METERED RESPIRATORY (INHALATION) EVERY 6 HOURS PRN
Status: DISCONTINUED | OUTPATIENT
Start: 2024-07-17 | End: 2024-07-17 | Stop reason: CLARIF

## 2024-07-17 RX ORDER — DIPHENHYDRAMINE HYDROCHLORIDE 50 MG/ML
25 INJECTION INTRAMUSCULAR; INTRAVENOUS EVERY 6 HOURS PRN
Status: DISCONTINUED | OUTPATIENT
Start: 2024-07-17 | End: 2024-07-19 | Stop reason: HOSPADM

## 2024-07-17 RX ORDER — INSULIN LISPRO 100 [IU]/ML
4-6 INJECTION, SOLUTION INTRAVENOUS; SUBCUTANEOUS 3 TIMES DAILY
Status: ON HOLD | COMMUNITY
End: 2024-07-19

## 2024-07-17 RX ORDER — ALBUTEROL SULFATE 2.5 MG/3ML
2.5 SOLUTION RESPIRATORY (INHALATION) EVERY 6 HOURS PRN
Status: DISCONTINUED | OUTPATIENT
Start: 2024-07-17 | End: 2024-07-19 | Stop reason: HOSPADM

## 2024-07-17 RX ORDER — SODIUM CHLORIDE 0.9 % (FLUSH) 0.9 %
5-40 SYRINGE (ML) INJECTION EVERY 12 HOURS SCHEDULED
Status: DISCONTINUED | OUTPATIENT
Start: 2024-07-17 | End: 2024-07-19 | Stop reason: HOSPADM

## 2024-07-17 RX ORDER — ACETAMINOPHEN 325 MG/1
650 TABLET ORAL EVERY 6 HOURS PRN
Status: DISCONTINUED | OUTPATIENT
Start: 2024-07-17 | End: 2024-07-19 | Stop reason: HOSPADM

## 2024-07-17 RX ADMIN — POLYETHYLENE GLYCOL-3350 AND ELECTROLYTES 4000 ML: 236; 6.74; 5.86; 2.97; 22.74 POWDER, FOR SOLUTION ORAL at 16:01

## 2024-07-17 RX ADMIN — CEFEPIME 1000 MG: 1 INJECTION, POWDER, FOR SOLUTION INTRAMUSCULAR; INTRAVENOUS at 21:56

## 2024-07-17 RX ADMIN — INSULIN LISPRO 4 UNITS: 100 INJECTION, SOLUTION INTRAVENOUS; SUBCUTANEOUS at 06:56

## 2024-07-17 RX ADMIN — DIPHENHYDRAMINE HYDROCHLORIDE 25 MG: 50 INJECTION INTRAMUSCULAR; INTRAVENOUS at 02:34

## 2024-07-17 RX ADMIN — SODIUM CHLORIDE 1250 MG: 9 INJECTION, SOLUTION INTRAVENOUS at 02:00

## 2024-07-17 RX ADMIN — CEFEPIME 1000 MG: 1 INJECTION, POWDER, FOR SOLUTION INTRAMUSCULAR; INTRAVENOUS at 00:43

## 2024-07-17 RX ADMIN — INSULIN GLARGINE 8 UNITS: 100 INJECTION, SOLUTION SUBCUTANEOUS at 20:31

## 2024-07-17 RX ADMIN — INSULIN LISPRO 4 UNITS: 100 INJECTION, SOLUTION INTRAVENOUS; SUBCUTANEOUS at 17:30

## 2024-07-17 RX ADMIN — CLOPIDOGREL BISULFATE 75 MG: 75 TABLET ORAL at 11:49

## 2024-07-17 RX ADMIN — FAMOTIDINE 20 MG: 20 TABLET, FILM COATED ORAL at 11:49

## 2024-07-17 RX ADMIN — POTASSIUM CHLORIDE 40 MEQ: 1500 TABLET, EXTENDED RELEASE ORAL at 01:56

## 2024-07-17 RX ADMIN — CALCIUM ACETATE 2668 MG: 667 CAPSULE ORAL at 11:48

## 2024-07-17 RX ADMIN — SODIUM CHLORIDE, PRESERVATIVE FREE 10 ML: 5 INJECTION INTRAVENOUS at 20:31

## 2024-07-17 RX ADMIN — CARVEDILOL 25 MG: 25 TABLET, FILM COATED ORAL at 13:13

## 2024-07-17 RX ADMIN — HEPARIN SODIUM 5000 UNITS: 5000 INJECTION INTRAVENOUS; SUBCUTANEOUS at 20:31

## 2024-07-17 RX ADMIN — HEPARIN SODIUM 5000 UNITS: 5000 INJECTION INTRAVENOUS; SUBCUTANEOUS at 01:55

## 2024-07-17 RX ADMIN — HEPARIN SODIUM 5000 UNITS: 5000 INJECTION INTRAVENOUS; SUBCUTANEOUS at 13:14

## 2024-07-17 RX ADMIN — INSULIN LISPRO 2 UNITS: 100 INJECTION, SOLUTION INTRAVENOUS; SUBCUTANEOUS at 11:49

## 2024-07-17 RX ADMIN — INSULIN LISPRO 10 UNITS: 100 INJECTION, SOLUTION INTRAVENOUS; SUBCUTANEOUS at 18:41

## 2024-07-17 ASSESSMENT — ENCOUNTER SYMPTOMS
ALLERGIC/IMMUNOLOGIC NEGATIVE: 1
SHORTNESS OF BREATH: 1
BLOOD IN STOOL: 1

## 2024-07-17 ASSESSMENT — PAIN SCALES - GENERAL: PAINLEVEL_OUTOF10: 0

## 2024-07-17 NOTE — ED NOTES
Patient reported itchiness upon receiving vancomycin IV. Vitals stable, no SOB, no apparent swelling. MD notified.

## 2024-07-17 NOTE — PROGRESS NOTES
Patients BS at 1648: 402    4 units of Humalog given per med order following blood sugar reading.     Blood sugar at 1825: 498    Dr Nguyen notified via [employer] and ordered 10 units of humalog x1 dose and changed sliding scale to medium dose scale.    Electronically signed by Priscilla Jaramillo RN on 7/17/24 at 6:31 PM EDT

## 2024-07-17 NOTE — FLOWSHEET NOTE
Pt request bs check she feels its high, glucometer will not read alert is high, er rn notified, no insulin in hd

## 2024-07-17 NOTE — ED NOTES
Report given to JENNIFER Wells from [facility], from JENNIFER Merida.   Report method in person   The following was reviewed with receiving RN:   Current vital signs:  /62   Pulse 96   Temp 98.7 °F (37.1 °C)   Resp 18   Ht 1.499 m (4' 11\")   Wt 63.5 kg (140 lb)   SpO2 95%   BMI 28.28 kg/m²                      Any medication or safety alerts were reviewed. Any pending diagnostics and notifications were also reviewed, as well as any safety concerns or issues, abnormal labs, abnormal imaging, and abnormal assessment findings. Questions were answered.

## 2024-07-17 NOTE — PROGRESS NOTES
4 Eyes Skin Assessment     NAME:  Michelle Nettles  YOB: 1985  MEDICAL RECORD NUMBER:  [id number]    The patient is being assessed for  Admission    I agree that at least one RN has performed a thorough Head to Toe Skin Assessment on the patient. ALL assessment sites listed below have been assessed.      Areas assessed by both nurses:    Head, Face, Ears, Shoulders, Back, Chest, Arms, Elbows, Hands, Sacrum. Buttock, Coccyx, Ischium, Legs. Feet and Heels, and Under Medical Devices         Does the Patient have a Wound? No noted wound(s)       Edgar Prevention initiated by RN: No  Wound Care Orders initiated by RN: No    Pressure Injury (Stage 3,4, Unstageable, DTI, NWPT, and Complex wounds) if present, place Wound referral order by RN under : No    New Ostomies, if present place, Ostomy referral order under : No     Nurse 1 eSignature: Electronically signed by Raven Merino RN on 7/17/24 at 6:19 PM EDT    **SHARE this note so that the co-signing nurse can place an eSignature**    Nurse 2 eSignature: Electronically signed by Priscilla Jaramillo RN on 7/17/24 at 6:20 PM EDT

## 2024-07-17 NOTE — FLOWSHEET NOTE
07/17/24 1035   Vital Signs   /63   Temp 96.8 °F (36 °C)   Pulse 95   Respirations 16   Post-Hemodialysis Assessment   Post-Treatment Procedures Blood returned;Access bleeding time < 10 minutes   Machine Disinfection Process Exterior Machine Disinfection   Rinseback Volume (ml) 300 ml   Blood Volume Processed (Liters) 89.5 L   Dialyzer Clearance Lightly streaked   Duration of Treatment (minutes) 240 minutes   Heparin Amount Administered During Treatment (mL) 0 mL   Hemodialysis Intake (ml) 300 ml   Hemodialysis Output (ml) 3300 ml   NET Removed (ml) 3000   Tolerated Treatment Good   Patient Response to Treatment tolerated well, blood returned, needles pulled sites held, stasis achieved, bandaids applied, +thrill, +bruit

## 2024-07-17 NOTE — CONSULTS
GENERAL SURGERY  CONSULT NOTE  2024    Physician Consulted: Dr. Gonzalez  Reason for Consult: Chronic Anemia  Referring Physician: Dr. Wendy ORDONEZ Lencho Nettles is a 39 y.o. female who presents to the general surgery service for evaluation of chronic anemia and hematochezia. The patient has been admitted since 2024 for  symptomatic chronic anemia.      1 month ago patient was seen for bright red blood per rectum.  Dr. Parker was consulted and recommended outpatient EGD and colonoscopy after Hb improved and stabilized following transfusion. Since then patient has had lethargy and continued melena. Today Hb is 7.4, baseline around 8. She take Plavix and receives HD three times weekly.    Medical history is significant for ESRD on HD, HTN, DM1. . The patient is taking Plavix.        Past Medical History:   Diagnosis Date    JOLLY (acute kidney injury) ([facility]) 2016    AVF (arteriovenous fistula) ([facility]) 2018    let arm    Chronic kidney disease     Dialysis AV fistula malfunction, initial encounter ([facility]) 2019    DM type 1 (diabetes mellitus, type 1) ([facility])     Encounter regarding vascular access for dialysis for ESRD ([facility]) 2018    ESRD (end stage renal disease) ([facility])     Hemodialysis patient ([facility])     amrita dawson  / graft in left arm    Hypertension     Tobacco abuse 2016       Past Surgical History:   Procedure Laterality Date     SECTION      CYST INCISION AND DRAINAGE  2011    perirectal abscess. [facility]. Dr. Fleming    DIALYSIS FISTULA CREATION Left 2019    LEFT ARM FISTULAGRAM, BALLOON ANGIOPLASTY performed by Haylee Zaidi MD at [facility] OR    DILATION AND CURETTAGE OF UTERUS      FRACTURE SURGERY      fracture right ulnar, left tibia, and pelvis    INVASIVE VASCULAR N/A 2023    Fistulogram left performed by Haylee Zaidi MD at [facility] CARDIAC CATH LAB    INVASIVE VASCULAR Left 2023

## 2024-07-17 NOTE — PROGRESS NOTES
1530:  Patient is concerned that bowel prep will off her fluid balance due to being on a fluid restriction and a dialysis patient.     Message sent to [facility] regarding this, and SROC stated bowel prep will not affect the fluid balance. Patient informed & verbalized understanding.      1600:  Patient provided with GoLytely Bowel prep and given thorough instructions on the prep. Patient stated she will start to drink prep now.     1630:  Orienting RN was in patients room for POCT testing and patient had not drank any of the bowel prep at this time. The RN informed patient she needs to start the prep and reinforced the importance of this. Patient stated \"If it doesn't get done it doesn't get done\"    1700:  Patient still has not started prep. This RN patient provided more education on bowel prep and the importance of the prep for scheduled colonoscopy tomorrow. Patent states \"My blood sugar is high I don't want anything right now\" Education reinforced.    1800:  At this time patient still has not started drinking bowel prep. Education reinforced again on the bowel prep and the need for it for patient to have a successful colonoscopy tomorrow. Patient drank small sip and stated \"Im not going to drink It that fast\".

## 2024-07-17 NOTE — CONSULTS
[facility]  Nephrology Consult Note    Patient's Name: Michelle Nettles    Reason for Consult: Missed hemodialysis    Chief Complaint: Low hemoglobin  History Obtained From:  patient, past medical records, and EMR    History of Present Illness:    Michelle Nettles is a 39 y.o. female with a past medical history of ESRD, diabetes mellitus, hypertension.  She presented to the ED on  for concerns of low hemoglobin.  Vital signs on  includes temperature 98.3, respirations 16, pulse 110, /79, and she was 100% SpO2.  Lab data on  includes potassium 3.3, BUN 53, creatinine 9.4, glucose 27, and hemoglobin 7.9.  She had a chest x-ray on  which showed no acute process.  Nephrology has been consulted to see the patient for missed hemodialysis.  Patient known to our service and dialyzes as an outpatient at [facility] via left arm AV fistula.  At present, patient was seen and examined.  She is lethargic, wakes up and reports that she feels tired.  She denies any chest pain or shortness of breath.  She denies any abdominal pain, nausea, vomiting, or diarrhea.  She denies any fevers.    PMH:    Past Medical History:   Diagnosis Date    JOLLY (acute kidney injury) ([facility]) 2016    AVF (arteriovenous fistula) ([facility]) 2018    let arm    Chronic kidney disease     Dialysis AV fistula malfunction, initial encounter ([facility]) 2019    DM type 1 (diabetes mellitus, type 1) ([facility])     Encounter regarding vascular access for dialysis for ESRD ([facility]) 2018    ESRD (end stage renal disease) ([facility])     Hemodialysis patient ([facility])     amrita plattt  / graft in left arm    Hypertension     Tobacco abuse 2016       Past Surgical History:   Procedure Laterality Date     SECTION  2013    CYST INCISION AND DRAINAGE  2011    perirectal abscess. [facility]. Dr. Fleming    DIALYSIS FISTULA CREATION Left 2019    LEFT ARM FISTULAGRAM, BALLOON ANGIOPLASTY performed by  data in the 24 hours ending 07/17/24 0827    General: Awake, alert, no acute distress  Neck: No JVD noted  Lungs: Clear bilaterally upper, diminished to the bases bilaterally.  Unlabored  CV: Regular rate and rhythm.  No rub  Abd: Soft, nontender, nondistended.  Active bowel sounds  Skin: Warm and dry.  No rash on exposed extremities  Ext: No edema; left arm AV fistula  Neuro: Awake, answers questions appropriately    Data:    Recent Labs     07/16/24 2030 07/17/24  0153   WBC 6.4  --    HGB 7.9* 7.4*   HCT 24.4* 22.5*   MCV 92.4  --      --        Recent Labs     07/16/24 2030      K 3.3*   CL 97*   CO2 28   CREATININE 9.4*   BUN 53*   LABGLOM 5*   GLUCOSE 27*   CALCIUM 9.5   PHOS 4.7*   MG 2.5       Vit D, 25-Hydroxy   Date Value Ref Range Status   12/05/2023 0.25  Corrected       No results found for: \"PTH\"    Recent Labs     07/16/24 2030   ALT 11   AST 16   ALKPHOS 100   BILITOT 0.2       No results for input(s): \"LABALBU\" in the last 72 hours.    Ferritin   Date Value Ref Range Status   03/23/2024 460 ng/mL Final     Comment:           FERRITIN Reference Ranges:  Adult Males   20 - 60 years:    30 - 400 ng/mL  Adult females 17 - 60 years:    13 - 150 ng/mL  Adults greater than 60 years:   no established reference range  Pediatrics:  no established reference range       Iron   Date Value Ref Range Status   03/23/2024 116 37 - 145 ug/dL Final     TIBC   Date Value Ref Range Status   03/23/2024 230 (L) 250 - 450 ug/dL Final       Vitamin B-12   Date Value Ref Range Status   03/23/2024 1852 (H) 211 - 946 pg/mL Final       Folate   Date Value Ref Range Status   03/23/2024 12.3 4.8 - 24.2 ng/mL Final       Lab Results   Component Value Date/Time    COLORU Yellow 11/23/2017 10:05 PM    NITRU Negative 11/23/2017 10:05 PM    GLUCOSEU 500 11/23/2017 10:05 PM    GLUCOSEU >=1000 11/21/2010 10:30 PM    KETUA Negative 11/23/2017 10:05 PM    UROBILINOGEN 0.2 11/23/2017 10:05 PM    BILIRUBINUR Negative

## 2024-07-17 NOTE — ED PROVIDER NOTES
Extremities: Moves all extremities x 4. Warm and well perfused, patient has a enlarged AV fistula palpable thrill left upper arm.  She has palpable pulse in the left radius.  Skin: warm and diaphoretic without rash  Neurologic: GCS 1 14, patient is confused.  5 out of 5 motor strength upper EXTR bilaterally.  Psych: Normal Affect    ------------------------------ ED COURSE/MEDICAL DECISION MAKING----------------------  Medications   dextrose 50 % IV solution (25 g IntraVENous Given 7/16/24 2032)   cefepime (MAXIPIME) 1,000 mg in sodium chloride 0.9 % 50 mL IVPB ([id number]) (0 mg IntraVENous Stopped 7/17/24 0136)   vancomycin (VANCOCIN) 1,250 mg in sodium chloride 0.9 % 250 mL IVPB ([id number]) (0 mg IntraVENous Stopped 7/17/24 0207)   potassium chloride (KLOR-CON M) extended release tablet 40 mEq (40 mEq Oral Given 7/17/24 0156)   polyethylene glycol (GoLYTELY) solution 4,000 mL (4,000 mLs Oral Given 7/17/24 1601)   insulin lispro (HUMALOG,ADMELOG) injection vial 10 Units (10 Units SubCUTAneous Given 7/17/24 1841)   amoxicillin-clavulanate (AUGMENTIN) 500-125 MG per tablet 1 tablet (1 tablet Oral Given 7/19/24 1212)             Medical Decision Making:    Michelle Nettles is a 39 y.o. female presenting to the ED for low hemoglobin prior to dialysis today allegedly is 5.6 g/dL.  Patient is also found to be hypoglycemic blood sugar of 28 she also complains of shortness of breath.  Patient did not get her hemodialysis today.  She normally goes Tuesday Thursday Saturday.  Her last dialysis was this past Saturday.  She does see Dr. Obregon, beginning 24 hours ago.  The complaint has been persistent, severe in severity, and worsened by nothing.  Patient states she did take her insulin today in did not have a large meal prior to coming to the emergency department.  In triage she was found to have a blood sugar of 28 g/dL.  She was given orange juice and D50.  Patient was brought back to the treatment area.    History  failure to initiate urgent interventions for this patient would likely result in a life threatening deterioration or permanent disability.  Accordingly this patient received the above mentioned time, excluding separately billable procedures.        --------------------------------- IMPRESSION AND DISPOSITION ---------------------------------    IMPRESSION  1. Hypoglycemia    2. SIRS (systemic inflammatory response syndrome) (HCC)    3. Stage 5 chronic kidney disease on chronic dialysis (HCC)    4. Melena    5. SOB (shortness of breath)        DISPOSITION  Disposition: Admit to telemetry  Patient condition is stable      NOTE: This report was transcribed using voice recognition software. Every effort was made to ensure accuracy; however, inadvertent computerized transcription errors may be present       Sheldon Spears DO  07/20/24 1134

## 2024-07-17 NOTE — H&P
PLANA VITRECTOMY 25 GAUGE, VITREOUS HEMORRHAGE REMOVAL, ENDO LASER, AIR/FLUID  EXCHANGE LEFT EYE performed by Pierce Fierro MD at [facility] OR    RECTAL SURGERY N/A 1/29/2021    PERINEAL ABCESS INCISION AND DRAINAGE (LITHOTOMY) performed by Dominic Brower [place] [facility] [place]    UPPER GASTROINTESTINAL ENDOSCOPY N/A 2/1/2024    EGD ESOPHAGOGASTRODUODENOSCOPY performed by Shira Parker [place] [facility] [place]       Medications Prior to Admission:    Prior to Admission medications    Medication Sig Start Date End Date Taking? Authorizing Provider   famotidine (PEPCID) 20 MG tablet TAKE 1 TABLET BY MOUTH EVERY MORNING 7/1/24   Kaitlin Merino APRN - CNP   Insulin Disposable Pump (OMNIPOD 5 G6 PODS, GEN 5,) MISC CHANGE pod every THREE DAYS as directed. 6/3/24   Angelina Benitez APRN - CNP   Alcohol Swabs (ALCOHOL PREP) PADS USE FOUR TIMES DAILY 10/4/22   Adrianne Rosales MD   GUAIFENESIN-CODEINE 100-10 MG/5 ML ORAL SOLN CMPD Take 10 mLs by mouth  Patient not taking: Reported on 6/7/2024 7/16/23   Adrianne Rosales MD   Benzoyl Peroxide (BENZAC AC) 10 % external wash APPLY TO THE FACEONCE DAILY 5/8/24   Adrianne Rosales MD   B Complex-C-Folic Acid (RENAL-JACOB) 0.8 MG TABS Take 1 tablet by mouth  Patient not taking: Reported on 6/7/2024 7/16/23   Adrianne Rosales MD   methylPREDNISolone (MEDROL DOSEPACK) 4 MG tablet FOLLOW PACKAGE DIRECTIONS  Patient not taking: Reported on 6/7/2024 2/26/24   Adrianne Rosales MD   Insulin Glargine, 2 Unit Dial, (TOUJEO MAX SOLOSTAR) 300 UNIT/ML SOPN Inject 15 units at bedtime and (8 Units on the nights before dialysis) 3/24/24   Brennan Dyer MD   insulin lispro, 1 Unit Dial, (HUMALOG KWIKPEN) 100 UNIT/ML SOPN Inject 4 units 3 times daily with meals plus the following sliding scale. -200 add 1U, -250 add 2U, -300 add 3U, -350 add 4U, -400 add 5U, BS over 400 add 6U 3/24/24   Brennan Dyer MD   Insulin Pen Needle (BD PEN  bruits  Abdomen: soft, non-tender, non-distended, normal bowel sounds, no masses or organomegaly  Extremities: no cyanosis, no clubbing and no edema  Neurologic: no cranial nerve deficit and speech normal        LABS:  Recent Labs     07/16/24 2030      K 3.3*   CL 97*   CO2 28   BUN 53*   CREATININE 9.4*   GLUCOSE 27*   CALCIUM 9.5       Recent Labs     07/16/24 2030 07/17/24  0153   WBC 6.4  --    RBC 2.64*  --    HGB 7.9* 7.4*   HCT 24.4* 22.5*   MCV 92.4  --    MCH 29.9  --    MCHC 32.4  --    RDW 15.6*  --      --    MPV 9.8  --        Recent Labs     07/16/24 2049 07/16/24 2202 07/16/24  2331 07/17/24  0152   POCGLU 124* 61* 111* 258*       Radiology:   XR CHEST PORTABLE   Final Result   No acute process.                 NOTE: This report was transcribed using voice recognition software. Every effort was made to ensure accuracy; however, inadvertent computerized transcription errors may be present.  Electronically signed by Brii Li MD on 7/17/2024 at 2:19 AM

## 2024-07-17 NOTE — PLAN OF CARE
Patient seen and examined at the dialysis unit.  Patient complains of fatigue, dark stools.  She denies hemoptysis, hematemesis, hematochezia, hematuria.  In previous admission she was seen by general surgery and was recommended to get colonoscopy and EGD as outpatient, she is requesting to be seen by general surgery to get EGD/colonoscopy while she is inpatient because it is hard for her to follow-up as outpatient, referral made await plan.  She was hypoglycemic on presentation, repeat blood glucose trended down to over 500, continue low-dose sliding scale, will start Lantus 8 units nightly and monitor.  For detailed information please refer to H&P from my colleague this morning.

## 2024-07-18 ENCOUNTER — ANESTHESIA (OUTPATIENT)
Dept: ENDOSCOPY | Age: 39
End: 2024-07-18
Payer: MEDICARE

## 2024-07-18 ENCOUNTER — ANESTHESIA EVENT (OUTPATIENT)
Dept: ENDOSCOPY | Age: 39
End: 2024-07-18
Payer: MEDICARE

## 2024-07-18 LAB
ANION GAP SERPL CALCULATED.3IONS-SCNC: 14 MMOL/L (ref 7–16)
BASOPHILS # BLD: 0.02 K/UL (ref 0–0.2)
BASOPHILS NFR BLD: 0 % (ref 0–2)
BUN SERPL-MCNC: 19 MG/DL (ref 6–20)
CALCIUM SERPL-MCNC: 8.5 MG/DL (ref 8.6–10.2)
CHLORIDE SERPL-SCNC: 97 MMOL/L (ref 98–107)
CO2 SERPL-SCNC: 24 MMOL/L (ref 22–29)
CREAT SERPL-MCNC: 5.1 MG/DL (ref 0.5–1)
EOSINOPHIL # BLD: 0.23 K/UL (ref 0.05–0.5)
EOSINOPHILS RELATIVE PERCENT: 4 % (ref 0–6)
ERYTHROCYTE [DISTWIDTH] IN BLOOD BY AUTOMATED COUNT: 15.9 % (ref 11.5–15)
GFR, ESTIMATED: 10 ML/MIN/1.73M2
GLUCOSE BLD-MCNC: 123 MG/DL (ref 74–99)
GLUCOSE BLD-MCNC: 252 MG/DL (ref 74–99)
GLUCOSE BLD-MCNC: 341 MG/DL (ref 74–99)
GLUCOSE BLD-MCNC: 74 MG/DL (ref 74–99)
GLUCOSE BLD-MCNC: <40 MG/DL (ref 74–99)
GLUCOSE SERPL-MCNC: 141 MG/DL (ref 74–99)
HCT VFR BLD AUTO: 26.4 % (ref 34–48)
HGB BLD-MCNC: 8.6 G/DL (ref 11.5–15.5)
IMM GRANULOCYTES # BLD AUTO: <0.03 K/UL (ref 0–0.58)
IMM GRANULOCYTES NFR BLD: 0 % (ref 0–5)
LYMPHOCYTES NFR BLD: 1.36 K/UL (ref 1.5–4)
LYMPHOCYTES RELATIVE PERCENT: 24 % (ref 20–42)
MAGNESIUM SERPL-MCNC: 2.2 MG/DL (ref 1.6–2.6)
MCH RBC QN AUTO: 30.7 PG (ref 26–35)
MCHC RBC AUTO-ENTMCNC: 32.6 G/DL (ref 32–34.5)
MCV RBC AUTO: 94.3 FL (ref 80–99.9)
MONOCYTES NFR BLD: 0.55 K/UL (ref 0.1–0.95)
MONOCYTES NFR BLD: 10 % (ref 2–12)
NEUTROPHILS NFR BLD: 63 % (ref 43–80)
NEUTS SEG NFR BLD: 3.61 K/UL (ref 1.8–7.3)
PHOSPHATE SERPL-MCNC: 3.8 MG/DL (ref 2.5–4.5)
PLATELET # BLD AUTO: 249 K/UL (ref 130–450)
PMV BLD AUTO: 9.7 FL (ref 7–12)
POTASSIUM SERPL-SCNC: 5 MMOL/L (ref 3.5–5)
RBC # BLD AUTO: 2.8 M/UL (ref 3.5–5.5)
SODIUM SERPL-SCNC: 135 MMOL/L (ref 132–146)
WBC OTHER # BLD: 5.8 K/UL (ref 4.5–11.5)

## 2024-07-18 PROCEDURE — 2500000003 HC RX 250 WO HCPCS

## 2024-07-18 PROCEDURE — 2500000003 HC RX 250 WO HCPCS: Performed by: STUDENT IN AN ORGANIZED HEALTH CARE EDUCATION/TRAINING PROGRAM

## 2024-07-18 PROCEDURE — 6370000000 HC RX 637 (ALT 250 FOR IP): Performed by: STUDENT IN AN ORGANIZED HEALTH CARE EDUCATION/TRAINING PROGRAM

## 2024-07-18 PROCEDURE — 6370000000 HC RX 637 (ALT 250 FOR IP): Performed by: INTERNAL MEDICINE

## 2024-07-18 PROCEDURE — 2060000000 HC ICU INTERMEDIATE R&B

## 2024-07-18 PROCEDURE — 2700000000 HC OXYGEN THERAPY PER DAY

## 2024-07-18 PROCEDURE — 36415 COLL VENOUS BLD VENIPUNCTURE: CPT

## 2024-07-18 PROCEDURE — 6360000002 HC RX W HCPCS: Performed by: INTERNAL MEDICINE

## 2024-07-18 PROCEDURE — 6360000002 HC RX W HCPCS

## 2024-07-18 PROCEDURE — 2580000003 HC RX 258: Performed by: STUDENT IN AN ORGANIZED HEALTH CARE EDUCATION/TRAINING PROGRAM

## 2024-07-18 PROCEDURE — 7100000000 HC PACU RECOVERY - FIRST 15 MIN: Performed by: SURGERY

## 2024-07-18 PROCEDURE — 85025 COMPLETE CBC W/AUTO DIFF WBC: CPT

## 2024-07-18 PROCEDURE — 3609012400 HC EGD TRANSORAL BIOPSY SINGLE/MULTIPLE: Performed by: SURGERY

## 2024-07-18 PROCEDURE — 3700000001 HC ADD 15 MINUTES (ANESTHESIA): Performed by: SURGERY

## 2024-07-18 PROCEDURE — 82962 GLUCOSE BLOOD TEST: CPT

## 2024-07-18 PROCEDURE — 7100000001 HC PACU RECOVERY - ADDTL 15 MIN: Performed by: SURGERY

## 2024-07-18 PROCEDURE — 2709999900 HC NON-CHARGEABLE SUPPLY: Performed by: SURGERY

## 2024-07-18 PROCEDURE — 80048 BASIC METABOLIC PNL TOTAL CA: CPT

## 2024-07-18 PROCEDURE — 0DB48ZX EXCISION OF ESOPHAGOGASTRIC JUNCTION, VIA NATURAL OR ARTIFICIAL OPENING ENDOSCOPIC, DIAGNOSTIC: ICD-10-PCS | Performed by: SURGERY

## 2024-07-18 PROCEDURE — 83735 ASSAY OF MAGNESIUM: CPT

## 2024-07-18 PROCEDURE — 2580000003 HC RX 258

## 2024-07-18 PROCEDURE — 84100 ASSAY OF PHOSPHORUS: CPT

## 2024-07-18 PROCEDURE — 99232 SBSQ HOSP IP/OBS MODERATE 35: CPT | Performed by: STUDENT IN AN ORGANIZED HEALTH CARE EDUCATION/TRAINING PROGRAM

## 2024-07-18 PROCEDURE — 0DJD8ZZ INSPECTION OF LOWER INTESTINAL TRACT, VIA NATURAL OR ARTIFICIAL OPENING ENDOSCOPIC: ICD-10-PCS | Performed by: SURGERY

## 2024-07-18 PROCEDURE — 0DB68ZX EXCISION OF STOMACH, VIA NATURAL OR ARTIFICIAL OPENING ENDOSCOPIC, DIAGNOSTIC: ICD-10-PCS | Performed by: SURGERY

## 2024-07-18 PROCEDURE — 3700000000 HC ANESTHESIA ATTENDED CARE: Performed by: SURGERY

## 2024-07-18 PROCEDURE — 3609027000 HC COLONOSCOPY: Performed by: SURGERY

## 2024-07-18 PROCEDURE — 6360000002 HC RX W HCPCS: Performed by: STUDENT IN AN ORGANIZED HEALTH CARE EDUCATION/TRAINING PROGRAM

## 2024-07-18 RX ORDER — PROPOFOL 10 MG/ML
INJECTION, EMULSION INTRAVENOUS PRN
Status: DISCONTINUED | OUTPATIENT
Start: 2024-07-18 | End: 2024-07-18 | Stop reason: SDUPTHER

## 2024-07-18 RX ORDER — DEXTROSE MONOHYDRATE AND SODIUM CHLORIDE 5; .45 G/100ML; G/100ML
INJECTION, SOLUTION INTRAVENOUS CONTINUOUS
Status: DISCONTINUED | OUTPATIENT
Start: 2024-07-18 | End: 2024-07-18

## 2024-07-18 RX ORDER — SODIUM CHLORIDE 9 MG/ML
INJECTION, SOLUTION INTRAVENOUS CONTINUOUS PRN
Status: DISCONTINUED | OUTPATIENT
Start: 2024-07-18 | End: 2024-07-18 | Stop reason: SDUPTHER

## 2024-07-18 RX ORDER — PANTOPRAZOLE SODIUM 40 MG/1
40 TABLET, DELAYED RELEASE ORAL
Status: DISCONTINUED | OUTPATIENT
Start: 2024-07-19 | End: 2024-07-19 | Stop reason: HOSPADM

## 2024-07-18 RX ORDER — PHENYLEPHRINE HCL IN 0.9% NACL 1 MG/10 ML
SYRINGE (ML) INTRAVENOUS PRN
Status: DISCONTINUED | OUTPATIENT
Start: 2024-07-18 | End: 2024-07-18 | Stop reason: SDUPTHER

## 2024-07-18 RX ORDER — PANTOPRAZOLE SODIUM 40 MG/1
40 TABLET, DELAYED RELEASE ORAL
Qty: 30 TABLET | Refills: 1 | Status: SHIPPED | OUTPATIENT
Start: 2024-07-18 | End: 2024-07-19

## 2024-07-18 RX ORDER — LIDOCAINE HYDROCHLORIDE 20 MG/ML
INJECTION, SOLUTION INTRAVENOUS PRN
Status: DISCONTINUED | OUTPATIENT
Start: 2024-07-18 | End: 2024-07-18 | Stop reason: SDUPTHER

## 2024-07-18 RX ORDER — DEXTROSE MONOHYDRATE 25 G/50ML
INJECTION, SOLUTION INTRAVENOUS PRN
Status: DISCONTINUED | OUTPATIENT
Start: 2024-07-18 | End: 2024-07-18 | Stop reason: SDUPTHER

## 2024-07-18 RX ADMIN — CARVEDILOL 25 MG: 25 TABLET, FILM COATED ORAL at 09:50

## 2024-07-18 RX ADMIN — INSULIN LISPRO 4 UNITS: 100 INJECTION, SOLUTION INTRAVENOUS; SUBCUTANEOUS at 20:29

## 2024-07-18 RX ADMIN — DEXTROSE AND SODIUM CHLORIDE: 5; 450 INJECTION, SOLUTION INTRAVENOUS at 18:54

## 2024-07-18 RX ADMIN — Medication 100 MCG: at 15:24

## 2024-07-18 RX ADMIN — Medication 100 MCG: at 15:18

## 2024-07-18 RX ADMIN — SODIUM CHLORIDE: 9 INJECTION, SOLUTION INTRAVENOUS at 14:56

## 2024-07-18 RX ADMIN — Medication 100 MCG: at 15:41

## 2024-07-18 RX ADMIN — HEPARIN SODIUM 5000 UNITS: 5000 INJECTION INTRAVENOUS; SUBCUTANEOUS at 20:28

## 2024-07-18 RX ADMIN — HEPARIN SODIUM 5000 UNITS: 5000 INJECTION INTRAVENOUS; SUBCUTANEOUS at 04:59

## 2024-07-18 RX ADMIN — INSULIN GLARGINE 8 UNITS: 100 INJECTION, SOLUTION SUBCUTANEOUS at 20:29

## 2024-07-18 RX ADMIN — DEXTROSE MONOHYDRATE 25 G: 25 INJECTION, SOLUTION INTRAVENOUS at 15:55

## 2024-07-18 RX ADMIN — LIDOCAINE HYDROCHLORIDE 60 MG: 20 INJECTION, SOLUTION INTRAVENOUS at 14:58

## 2024-07-18 RX ADMIN — CALCIUM ACETATE 2668 MG: 667 CAPSULE ORAL at 18:19

## 2024-07-18 RX ADMIN — PROPOFOL 300 MG: 10 INJECTION, EMULSION INTRAVENOUS at 14:58

## 2024-07-18 RX ADMIN — SODIUM CHLORIDE, PRESERVATIVE FREE 10 ML: 5 INJECTION INTRAVENOUS at 20:29

## 2024-07-18 ASSESSMENT — PAIN SCALES - GENERAL
PAINLEVEL_OUTOF10: 0

## 2024-07-18 ASSESSMENT — ENCOUNTER SYMPTOMS: SHORTNESS OF BREATH: 1

## 2024-07-18 ASSESSMENT — PAIN - FUNCTIONAL ASSESSMENT: PAIN_FUNCTIONAL_ASSESSMENT: NONE - DENIES PAIN

## 2024-07-18 NOTE — ACP (ADVANCE CARE PLANNING)
Advance Care Planning   Healthcare Decision Maker:    Primary Decision Maker: Deana Murillo - Brother/Sister - [phone]    Secondary Decision Maker: Cain Musa - Brother/Sister - [phone]    Supplemental (Other) Decision Maker: Nicole Musa - Other - [phone]    Supplemental (Other) Decision Maker: Barbie Celeste - Brother/Sister - [phone]    Supplemental (Other) Decision Maker: Santosh Pink - Aunt/Uncle - [phone]    Click here to complete Healthcare Decision Makers including selection of the Healthcare Decision Maker Relationship (ie \"Primary\").

## 2024-07-18 NOTE — CARE COORDINATION
Transition of Care: Met with pt at bedside to discuss transition of care. [facility] [place] handicap daughter that is wheelchair bound (she is staying with family while pt is in hospital). Independent with ADL's. O2 through [employer]. Active . PCP . Pharmacy [facility]. GUILLERMINA @ [facility]. She uses her insurance for transport when needed. Pt planning to return home at discharge with no anticipated needs. Pt admitted with anemia. Scheduled for EGD and colonoscopy today. CM to follow (TF)         NOLAN Kee,RN  Case Management  [phone]            Case Management Assessment  Initial Evaluation    Date/Time of Evaluation: 7/18/2024 2:23 PM  Assessment Completed by: ORACIO RODRÍGUEZ RN    If patient is discharged prior to next notation, then this note serves as note for discharge by case management.    Patient Name: Michelle Nettles                   YOB: 1985  Diagnosis: Hypoglycemia [E16.2]  SIRS (systemic inflammatory response syndrome) (HCC) [R65.10]  Stage 5 chronic kidney disease on chronic dialysis (HCC) [N18.6, Z99.2]                   Date / Time: 7/16/2024  8:15 PM    Patient Admission Status: Inpatient   Readmission Risk (Low < 19, Mod (19-27), High > 27): Readmission Risk Score: 20.3    Current PCP: May Waters, DO  PCP verified by CM? Yes    Chart Reviewed: Yes      History Provided by: Patient  Patient Orientation: Alert and Oriented    Patient Cognition: Alert    Hospitalization in the last 30 days (Readmission):  No    If yes, Readmission Assessment in CM Navigator will be completed.    Advance Directives:      Code Status: Full Code   Patient's Primary Decision Maker is: Patient Declined (Legal Next of Kin Remains as Decision Maker)    Primary Decision Maker: Deana Murillo - Brother/Sister - [phone]    Secondary Decision Maker: Cain Musa - Brother/Sister - [phone]    Supplemental (Other)  Decision Maker: Nicole Musa - Other - [phone]    Supplemental (Other) Decision Maker: Lencho Barbie Batista - Brother/Sister - [phone]    Supplemental (Other) Decision Maker: Santosh Pink - Aunt/Uncle - [phone]    Discharge Planning:    Patient lives with: Children Type of Home: House  Primary Care Giver: Self  Patient Support Systems include: Family Members   Current Financial resources:    Current community resources:    Current services prior to admission: Oxygen Therapy            Current DME:              Type of Home Care services:  None    ADLS  Prior functional level: Independent in ADLs/IADLs  Current functional level: Independent in ADLs/IADLs    PT AM-PAC:   /24  OT AM-PAC:   /24    Family can provide assistance at DC: Yes  Would you like Case Management to discuss the discharge plan with any other family members/significant others, and if so, who? No  Plans to Return to Present Housing: Yes  Other Identified Issues/Barriers to RETURNING to current housing:   Potential Assistance needed at discharge: Home Care            Potential DME:    Patient expects to discharge to: House  Plan for transportation at discharge:      Financial    Payor: [facility] MEDICARE / Plan: [employer] DUAL COMPLETE / Product Type: *No Product type* /     Does insurance require precert for SNF: Yes    Potential assistance Purchasing Medications:    Meds-to-Beds request: Yes      [id number] - [place], [place] - [address] - P [phone] - F [phone]  [address]  [facility] [address]  Phone: [phone] Fax: [phone]    [facility] [place] - [address] [place] [address] - P [phone] - F [phone] [address] [place] [address]  [place] [address]  Phone: [phone] Fax: [phone]      Notes:    Factors facilitating achievement of predicted outcomes: Family support and Cooperative    Barriers to discharge: Medication managment    Additional Case

## 2024-07-18 NOTE — PROGRESS NOTES
Hospitalist Progress Note      Chief Complaint:  had concerns including Blood Sugar Problem (Dialysis called patient today because HGB was 5.6. While waiting for triage BGL was 28, C/O SOB. ).    Admission Date: 2024     SYNOPSIS:Patient is a 39-year-old female who presented to the ED for low hemoglobin, was at dialysis today and reportedly it was 5.6, hemoglobin 7.9 on presentation to the ED. Blood sugar in the 20s on presentation and was given orange juice and D50.  Patient is on insulin for diabetes outpatient.  Patient states she has been fluctuating a lot with her blood sugars at home and dropping really low.  During the daytime blood sugar went up to 400s, restarted on lantus and Medium SSI.   For anemia General surgery consulted, plan for EGD and colonoscopy    SUBJECTIVE:    Patient seen and examined at bedside.  She denies fever, chills, nausea, vomiting, abdominal pain, cough, chest pain.   She complains of having some burning around rectal area due to multiple bowel movements from the bowel prep, will apply Aquaphor.  Records reviewed.   Stable overnight. No overnight issues reported.    Temp (24hrs), Av.5 °F (36.4 °C), Min:97.2 °F (36.2 °C), Max:98.3 °F (36.8 °C)    DIET: Diet NPO Exceptions are: Sips of Water with Meds  CODE: Full Code  No intake or output data in the 24 hours ending 24 1141    OBJECTIVE:    /68   Pulse 94   Temp 98.3 °F (36.8 °C) (Oral)   Resp 16   Ht 1.499 m (4' 11\")   Wt 63.5 kg (140 lb)   SpO2 98%   BMI 28.28 kg/m²     General appearance: No apparent distress, appears stated age and cooperative.   HEENT:  Normocephalic. Conjunctivae/corneas clear. Moist mucosa   Neck: Supple. No jugular venous distention. Thyroid not visible, non tender   Respiratory: Normal respiratory effort. Clear to auscultation bilaterally. No stridor/wheezing/rhonchi/crackles   Cardiovascular: Regular heart beats, normal S1-S2. No M/G/R  Abdomen: Non distended, soft, non

## 2024-07-18 NOTE — PROGRESS NOTES
GENERAL SURGERY  DAILY PROGRESS NOTE  7/18/2024    Subjective:  No new complaints or overnight events. No melena overnight. Hb stable. NPO. Prepped and clear for scopes today.    Objective:  /68   Pulse 94   Temp 98.3 °F (36.8 °C) (Oral)   Resp 16   Ht 1.499 m (4' 11\")   Wt 63.5 kg (140 lb)   SpO2 98%   BMI 28.28 kg/m²     General Appearance:  awake, alert, oriented, in no acute distress  Skin:  Skin color, texture, turgor normal  Head/face:  NCAT  Eyes:  No gross abnormalities. Sclera nonicteric  Lungs/Chest:  Normal expansion. No respiratory distress. On   Heart: Warm throughout. Regular rate   Abdomen:  Soft, noon-distended.    Extremities: Extremities warm to touch, pink, with no edema.      I have personally reviewed all relevant labs and imaging.    Assessment/Plan:  39 y.o. female w/ melena  -continue to hold Plavix  -Plan for EGD and C-scope today  -CBC, transfusion if necessary monitor for signs of bleeding      Electronically signed by George Anderson DO on 7/18/2024 at 8:41 AM

## 2024-07-18 NOTE — PROGRESS NOTES
8 Units into the skin See Admin Instructions Given on Tuesdays, Thursdays and Saturdays      insulin lispro, 1 Unit Dial, (HUMALOG KWIKPEN) 100 UNIT/ML SOPN Inject 4-6 Units into the skin 3 times daily *Per Sliding Scale*      famotidine (PEPCID) 20 MG tablet TAKE 1 TABLET BY MOUTH EVERY MORNING 30 tablet 0    Benzoyl Peroxide (BENZAC AC) 10 % external wash Apply 1 Application topically every morning Apply to face      carvedilol (COREG) 25 MG tablet TAKE 1 TABLET BY MOUTH DAILY WITH SUPPER 30 tablet 0    OXYGEN Inhale 3 L into the lungs continuous      Glucagon, rDNA, (GLUCAGON EMERGENCY) 1 MG KIT Inject 1 mg as directed as needed (for blood glucose level <50) 1 kit 5    Cyanocobalamin (VITAMIN B12) 500 MCG TABS Take 500 mcg by mouth daily      Probiotic Product (PROBIOTIC DAILY) CAPS Take 1 tablet by mouth daily      calcium acetate (PHOSLO) 667 MG CAPS capsule Take 3 capsules by mouth 3 times daily (with meals)      albuterol sulfate  (90 Base) MCG/ACT inhaler Inhale 2 puffs into the lungs every 4-6 hours as needed for Wheezing or Shortness of Breath 1 Inhaler 2       Allergies:    Vancomycin      Review of Systems:   Pertinent items are noted in HPI.    Physical Exam:      Patient Vitals for the past 24 hrs:   BP Temp Temp src Pulse Resp SpO2   07/18/24 0800 135/68 98.3 °F (36.8 °C) Oral 94 16 98 %   07/18/24 0430 120/62 97.2 °F (36.2 °C) Oral 60 18 100 %   07/17/24 2340 138/65 97.2 °F (36.2 °C) Oral 90 18 100 %   07/17/24 1959 (!) 140/61 97.2 °F (36.2 °C) Infrared 92 20 100 %         Intake/Output Summary (Last 24 hours) at 7/18/2024 1554  Last data filed at 7/18/2024 1550  Gross per 24 hour   Intake 250 ml   Output --   Net 250 ml       General: Awake, alert, no acute distress  Neck: No JVD noted  Lungs: Clear bilaterally upper, diminished to the bases bilaterally.  Unlabored  CV: Regular rate and rhythm.  No rub  Abd: Soft, nontender, nondistended.  Active bowel sounds  Skin: Warm and dry.  No rash on  PM       Lab Results   Component Value Date/Time    PROTEIN 8.0 07/16/2024 08:30 PM       No components found for: \"URIC\"    No results found for: \"LIPIDPAN\"    Assessment and Plans:    ESRD on HD  Outpatient [facility] via left arm AV fistula  Monitor labs  Continue HD while inpatient-HD today as missed HD yesterday    2.  Anemia  Concern for GI bleed  Hemoglobin target 10-12  Hemoglobin 8.6 today  Transfuse for hemoglobin<7  General surgery consulted for possible scope  Monitor H&H    3.  Hypertension with CKD 5/ESRD  BP goal<130/80  BP above goal  Monitor BPs on current regimen and with UF on HD    4.  Hypokalemia  Potassium 3.3 on 7/16  HD today on 4 K+ bath  Supplement potassium as needed  Monitor labs    5.  Diabetes mellitus  Appears uncontrolled as she has been hypo and hyperglycemic   Consider endocrinology consult  Defer to primary service      Danial Gerard MD

## 2024-07-18 NOTE — ANESTHESIA PRE PROCEDURE
\"[id number]\"     Type & Screen (If Applicable):  No results found for: \"LABABO\"    Drug/Infectious Status (If Applicable):  No results found for: \"HIV\", \"HEPCAB\"    COVID-19 Screening (If Applicable):   Lab Results   Component Value Date/Time    COVID19 Not Detected 2023 08:29 PM     EK24  Sinus tachycardia  Possible Left atrial enlargement  Cannot rule out Anterior infarct (cited on or before 2024)  Abnormal ECG  When compared with ECG of 2024 19:02,  Significant changes have occurred  Confirmed by Harmeet Zeng ([id number]) on 2024 5:44:48 PM      ECHO: 23  Left Ventricle   Estimated left ventricle ejection fraction 60 to 65 %.   Moderate asymmetric septal hypertrophy.   Left ventricular diastolic filling is elevated .      Right Ventricle   Normal right ventricular size and function.      Left Atrium   The left atrium is mildly dilated.      Right Atrium   Normal right atrium size.      Mitral Valve   Mild mitral regurgitation is present.      Tricuspid Valve   Moderate tricuspid regurgitation.   RVSP is 48 mmHg.      Aortic Valve   The aortic valve is trileaflet.   Aortic valve opens well.      Pulmonic Valve   Mild pulmonic regurgitation present.      Pleural Effusion   Pleural effusion is noted.      Conclusions      Summary   Estimated left ventricle ejection fraction 60 to 65 %.   Moderate asymmetric septal hypertrophy.   Left ventricular diastolic filling is elevated .   Normal right ventricular size and function.   Mild mitral regurgitation is present.   Moderate tricuspid regurgitation.   RVSP is 48 mmHg.   Mild pulmonic regurgitation present.    Anesthesia Evaluation  Patient summary reviewed and Nursing notes reviewed   no history of anesthetic complications:   Airway: Mallampati: II  TM distance: <3 FB   Neck ROM: full  Mouth opening: < 3 FB   Dental:          Pulmonary:   (+)   shortness of breath:     decreased breath sounds                              ROS comment: 3-4

## 2024-07-19 VITALS
BODY MASS INDEX: 29.69 KG/M2 | TEMPERATURE: 97.6 F | HEART RATE: 103 BPM | RESPIRATION RATE: 18 BRPM | HEIGHT: 59 IN | OXYGEN SATURATION: 96 % | SYSTOLIC BLOOD PRESSURE: 129 MMHG | DIASTOLIC BLOOD PRESSURE: 62 MMHG | WEIGHT: 147.27 LBS

## 2024-07-19 LAB
GLUCOSE BLD-MCNC: 248 MG/DL (ref 74–99)
GLUCOSE BLD-MCNC: 252 MG/DL (ref 74–99)

## 2024-07-19 PROCEDURE — 6360000002 HC RX W HCPCS: Performed by: STUDENT IN AN ORGANIZED HEALTH CARE EDUCATION/TRAINING PROGRAM

## 2024-07-19 PROCEDURE — 2580000003 HC RX 258: Performed by: STUDENT IN AN ORGANIZED HEALTH CARE EDUCATION/TRAINING PROGRAM

## 2024-07-19 PROCEDURE — 6370000000 HC RX 637 (ALT 250 FOR IP): Performed by: STUDENT IN AN ORGANIZED HEALTH CARE EDUCATION/TRAINING PROGRAM

## 2024-07-19 PROCEDURE — 99239 HOSP IP/OBS DSCHRG MGMT >30: CPT | Performed by: STUDENT IN AN ORGANIZED HEALTH CARE EDUCATION/TRAINING PROGRAM

## 2024-07-19 PROCEDURE — 2700000000 HC OXYGEN THERAPY PER DAY

## 2024-07-19 PROCEDURE — 82962 GLUCOSE BLOOD TEST: CPT

## 2024-07-19 PROCEDURE — 90935 HEMODIALYSIS ONE EVALUATION: CPT

## 2024-07-19 PROCEDURE — 2500000003 HC RX 250 WO HCPCS: Performed by: STUDENT IN AN ORGANIZED HEALTH CARE EDUCATION/TRAINING PROGRAM

## 2024-07-19 RX ORDER — AMOXICILLIN AND CLAVULANATE POTASSIUM 500; 125 MG/1; MG/1
1 TABLET, FILM COATED ORAL ONCE
Status: DISCONTINUED | OUTPATIENT
Start: 2024-07-19 | End: 2024-07-19

## 2024-07-19 RX ORDER — INSULIN LISPRO 100 [IU]/ML
INJECTION, SOLUTION INTRAVENOUS; SUBCUTANEOUS
Qty: 5 ADJUSTABLE DOSE PRE-FILLED PEN SYRINGE | Refills: 1 | Status: SHIPPED | OUTPATIENT
Start: 2024-07-19

## 2024-07-19 RX ORDER — PANTOPRAZOLE SODIUM 40 MG/1
40 TABLET, DELAYED RELEASE ORAL
Qty: 30 TABLET | Refills: 2 | Status: SHIPPED | OUTPATIENT
Start: 2024-07-19 | End: 2024-07-19

## 2024-07-19 RX ORDER — INSULIN LISPRO 100 [IU]/ML
4-6 INJECTION, SOLUTION INTRAVENOUS; SUBCUTANEOUS 3 TIMES DAILY
Qty: 5 ADJUSTABLE DOSE PRE-FILLED PEN SYRINGE | Refills: 1 | Status: SHIPPED | OUTPATIENT
Start: 2024-07-19 | End: 2024-07-19 | Stop reason: HOSPADM

## 2024-07-19 RX ORDER — INSULIN LISPRO 100 [IU]/ML
4-6 INJECTION, SOLUTION INTRAVENOUS; SUBCUTANEOUS 3 TIMES DAILY
Qty: 5 ADJUSTABLE DOSE PRE-FILLED PEN SYRINGE | Refills: 1 | Status: SHIPPED | OUTPATIENT
Start: 2024-07-19 | End: 2024-07-19

## 2024-07-19 RX ORDER — CALCIUM ACETATE 667 MG/1
4 CAPSULE ORAL
Qty: 180 CAPSULE | Refills: 1 | Status: SHIPPED | OUTPATIENT
Start: 2024-07-19

## 2024-07-19 RX ORDER — ALBUTEROL SULFATE 90 UG/1
2 AEROSOL, METERED RESPIRATORY (INHALATION)
Qty: 1 EACH | Refills: 2 | Status: SHIPPED | OUTPATIENT
Start: 2024-07-19 | End: 2024-07-19

## 2024-07-19 RX ORDER — PANTOPRAZOLE SODIUM 40 MG/1
40 TABLET, DELAYED RELEASE ORAL
Qty: 90 TABLET | OUTPATIENT
Start: 2024-07-19

## 2024-07-19 RX ORDER — ALBUTEROL SULFATE 90 UG/1
2 AEROSOL, METERED RESPIRATORY (INHALATION)
Qty: 1 EACH | Refills: 2 | Status: SHIPPED | OUTPATIENT
Start: 2024-07-19

## 2024-07-19 RX ORDER — AMOXICILLIN AND CLAVULANATE POTASSIUM 500; 125 MG/1; MG/1
1 TABLET, FILM COATED ORAL ONCE
Status: COMPLETED | OUTPATIENT
Start: 2024-07-19 | End: 2024-07-19

## 2024-07-19 RX ORDER — PANTOPRAZOLE SODIUM 40 MG/1
40 TABLET, DELAYED RELEASE ORAL
Qty: 30 TABLET | Refills: 2 | Status: SHIPPED | OUTPATIENT
Start: 2024-07-19

## 2024-07-19 RX ORDER — CALCIUM ACETATE 667 MG/1
4 CAPSULE ORAL
Qty: 180 CAPSULE | Refills: 1 | Status: SHIPPED | OUTPATIENT
Start: 2024-07-19 | End: 2024-07-19

## 2024-07-19 RX ORDER — AMOXICILLIN AND CLAVULANATE POTASSIUM 500; 125 MG/1; MG/1
1 TABLET, FILM COATED ORAL DAILY
Qty: 6 TABLET | Refills: 0 | Status: SHIPPED | OUTPATIENT
Start: 2024-07-19 | End: 2024-07-25

## 2024-07-19 RX ORDER — INSULIN GLARGINE 300 U/ML
8 INJECTION, SOLUTION SUBCUTANEOUS
Qty: 3 ADJUSTABLE DOSE PRE-FILLED PEN SYRINGE | Refills: 1 | Status: SHIPPED | OUTPATIENT
Start: 2024-07-19 | End: 2024-07-19

## 2024-07-19 RX ORDER — INSULIN LISPRO 100 [IU]/ML
INJECTION, SOLUTION INTRAVENOUS; SUBCUTANEOUS
Qty: 5 ADJUSTABLE DOSE PRE-FILLED PEN SYRINGE | Refills: 5 | Status: SHIPPED | OUTPATIENT
Start: 2024-07-19 | End: 2024-07-19

## 2024-07-19 RX ORDER — INSULIN GLARGINE 300 U/ML
8 INJECTION, SOLUTION SUBCUTANEOUS
Qty: 3 ADJUSTABLE DOSE PRE-FILLED PEN SYRINGE | Refills: 1 | Status: SHIPPED | OUTPATIENT
Start: 2024-07-19

## 2024-07-19 RX ORDER — AMOXICILLIN AND CLAVULANATE POTASSIUM 500; 125 MG/1; MG/1
1 TABLET, FILM COATED ORAL DAILY
Qty: 6 TABLET | Refills: 0 | Status: SHIPPED | OUTPATIENT
Start: 2024-07-19 | End: 2024-07-19

## 2024-07-19 RX ADMIN — SODIUM CHLORIDE, PRESERVATIVE FREE 10 ML: 5 INJECTION INTRAVENOUS at 12:15

## 2024-07-19 RX ADMIN — HEPARIN SODIUM 5000 UNITS: 5000 INJECTION INTRAVENOUS; SUBCUTANEOUS at 05:35

## 2024-07-19 RX ADMIN — INSULIN LISPRO 4 UNITS: 100 INJECTION, SOLUTION INTRAVENOUS; SUBCUTANEOUS at 12:12

## 2024-07-19 RX ADMIN — CALCIUM ACETATE 2668 MG: 667 CAPSULE ORAL at 12:12

## 2024-07-19 RX ADMIN — AMOXICILLIN AND CLAVULANATE POTASSIUM 1 TABLET: 500; 125 TABLET, FILM COATED ORAL at 12:12

## 2024-07-19 RX ADMIN — CLOPIDOGREL BISULFATE 75 MG: 75 TABLET ORAL at 12:12

## 2024-07-19 RX ADMIN — CARVEDILOL 25 MG: 25 TABLET, FILM COATED ORAL at 12:12

## 2024-07-19 RX ADMIN — PANTOPRAZOLE SODIUM 40 MG: 40 TABLET, DELAYED RELEASE ORAL at 05:35

## 2024-07-19 ASSESSMENT — PAIN SCALES - GENERAL: PAINLEVEL_OUTOF10: 0

## 2024-07-19 NOTE — DISCHARGE SUMMARY
Hospitalist Discharge Summary    Patient ID: Michelle Nettles   Patient : 1985  Patient's PCP: May Waters DO    Admit Date: 2024   Admitting Physician: Casi Batista DO    Discharge Date:  2024   Discharge Physician: Della Nguyen MD   Discharge Condition: Stable  Discharge Disposition: Home      Hospital course in brief:  (Please refer to daily progress notes for a comprehensive review of the hospitalization by requesting medical records)    Patient is a 39-year-old female who presented to the ED for low hemoglobin, was at dialysis today and reportedly it was 5.6, hemoglobin 7.9 on presentation to the ED. Blood sugar in the 20s on presentation and was given orange juice and D50.  Patient is on insulin for diabetes outpatient.  Patient states she has been fluctuating a lot with her blood sugars at home and dropping really low.    She stated she uses Lantus 15 units and on Dialysis days uses 8 units, reduced the dose of Lantus to 8 units QHS along with her sliding scale, she follows up with Endocrinology as outpatient,advised her to follow up in their office for further adjustment of medications as needed.    For anemia General surgery consulted,underwent EGD and colonoscopy,   EGD w esophagitis and a  Schatzki ring, no signs of bleeding on colonoscopy except internal hemorrhoids. Was started on PPI daily.    When patient presented she was tachycardic, generalized weakness, was started on vancomycin and Cefepime, She was allergic to vancomycin so stopped and cefepime was continued. Blood c/s negative, CXR no signs of pneumonia, No SOB, Cough. Patient stated she has pain in her tooth, has appointment with dentist to get it pulled out, will send her home with a script of Augmentin once daily for 7 days.    Patient was stable from medical standpoint to be discharged home.     Physical Exam:    General appearance: No apparent distress   HEENT:  Normocephalic.  to review them with you.                STOP taking these medications      famotidine 20 MG tablet  Commonly known as: PEPCID               Where to Get Your Medications        These medications were sent to [facility] - [place], [place] - [address] - P [phone] - F [phone]  [address] [place] [address]      Phone: [phone]   albuterol (5 MG/ML) 0.5% nebulizer solution  albuterol sulfate  (90 Base) MCG/ACT inhaler  amoxicillin-clavulanate 500-125 MG per tablet  calcium acetate 667 MG Caps capsule  insulin lispro (1 Unit Dial) 100 UNIT/ML Sopn  Toujeo Max SoloStar 300 UNIT/ML Sopn       These medications were sent to [id number] - [place], [place] - [address] - P [phone] - F [phone] [address] [facility] [address]      Phone: [phone]   pantoprazole 40 MG tablet         Time Spent on discharge is 40 minutes in the examination, evaluation, counseling and review of medications and discharge plan.    +++++++++++++++++++++++++++++++++++++++++++++++++  Della Nguyen MD  [facility]  [facility] [place] - [place], [place]  +++++++++++++++++++++++++++++++++++++++++++++++++  NOTE: This report was transcribed using voice recognition software. Every effort was made to ensure accuracy; however, inadvertent computerized transcription errors may be present.

## 2024-07-19 NOTE — FLOWSHEET NOTE
07/19/24 1048   Vital Signs   BP (!) 170/81   Temp 97.1 °F (36.2 °C)   Pulse (!) 101   Respirations 16   Weight - Scale 66.8 kg (147 lb 4.3 oz)   Weight Method Bed scale   Percent Weight Change 5.19   Post-Hemodialysis Assessment   Post-Treatment Procedures Blood returned;Access bleeding time < 10 minutes   Machine Disinfection Process Exterior Machine Disinfection   Rinseback Volume (ml) 300 ml   Blood Volume Processed (Liters) 89.1 L   Dialyzer Clearance Lightly streaked   Duration of Treatment (minutes) 240 minutes   Heparin Amount Administered During Treatment (mL) 0 mL   Hemodialysis Intake (ml) 300 ml   Hemodialysis Output (ml) 2300 ml   NET Removed (ml) 2000   Tolerated Treatment Good   Patient Response to Treatment tolerated well, blood returned, needles pulled sites held, stasis achieved, bandaids applied, +thirll, +bruit

## 2024-07-19 NOTE — PROGRESS NOTES
HTN (hypertension), benign    Facial cellulitis    Acute hyperkalemia    Nasal polyp    Periorbital cellulitis of right eye    Periorbital cellulitis    Vitamin B deficiency, unspecified    Nasal congestion    Hidradenitis suppurativa    Symptomatic anemia    Normocytic anemia due to blood loss    Gall stone    Dietary noncompliance    Metabolic encephalopathy    Hypoxia    Hypotension    History of venous thromboembolism    Chronic systolic (congestive) heart failure    MRSA colonization    ESRD on hemodialysis (HCC)    SOB (shortness of breath)    Chronic hypoxic respiratory failure, on home oxygen therapy ([facility])    Acute on chronic anemia    Hyperglycemia due to diabetes mellitus ([facility])    Hypercalcemia    GI bleed    Hypoglycemia       Diet:    ADULT DIET; Regular; No Added Salt (3-4 gm); Low Potassium (Less than 3000 mg/day); Low Phosphorus (Less than 1000 mg)    Meds:     pantoprazole  40 mg Oral QAM AC    sodium chloride flush  5-40 mL IntraVENous 2 times per day    heparin (porcine)  5,000 Units SubCUTAneous 3 times per day    calcium acetate  4 capsule Oral TID WC    carvedilol  25 mg Oral Daily    clopidogrel  75 mg Oral Daily    insulin glargine  8 Units SubCUTAneous Nightly    insulin lispro  0-8 Units SubCUTAneous TID WC    insulin lispro  0-4 Units SubCUTAneous Nightly        sodium chloride      dextrose      sodium chloride      [Held by provider] dextrose 5 % and 0.45 % NaCl Stopped (07/16/24 2222)       Meds prn:     menthol-zinc oxide, sodium chloride flush, sodium chloride, ondansetron **OR** ondansetron, polyethylene glycol, acetaminophen **OR** acetaminophen, glucose, dextrose bolus **OR** dextrose bolus, glucagon (rDNA), dextrose, hydrALAZINE, albuterol, diphenhydrAMINE, sodium chloride    Meds prior to admission:     No current facility-administered medications on file prior to encounter.     Current Outpatient Medications on File Prior to Encounter   Medication Sig Dispense Refill     currently use alcohol. She reports current drug use. Drug: Marijuana (Weed).    Family History:         Problem Relation Age of Onset    Stroke Mother     Cancer Father     Diabetes Paternal Aunt      Physical Exam:      Patient Vitals for the past 24 hrs:   BP Temp Temp src Pulse Resp SpO2   07/19/24 0430 (!) 148/80 97.1 °F (36.2 °C) Oral 79 18 100 %   07/18/24 2004 120/62 97.1 °F (36.2 °C) Oral 90 22 100 %   07/18/24 1645 (!) 144/63 97 °F (36.1 °C) Temporal 73 23 100 %   07/18/24 1630 131/72 -- -- 73 19 100 %   07/18/24 1615 125/69 -- -- 74 17 100 %   07/18/24 1608 125/70 97 °F (36.1 °C) Temporal 73 22 100 %   07/18/24 1552 (!) 106/56 -- -- 74 -- 97 %           Intake/Output Summary (Last 24 hours) at 7/19/2024 0823  Last data filed at 7/18/2024 1550  Gross per 24 hour   Intake 250 ml   Output --   Net 250 ml       General: Awake, alert, no acute distress  Neck: No JVD noted  Lungs: Clear bilaterally upper, diminished to the bases bilaterally.  Unlabored  CV: Regular rate and rhythm.  No rub  Abd: Soft, nontender, nondistended.  Active bowel sounds  Skin: Warm and dry.  No rash on exposed extremities  Ext: No edema; left arm AV fistula  Neuro: Awake, answers questions appropriately    Data:    Recent Labs     07/16/24 2030 07/17/24  0153 07/17/24  1255 07/18/24  0440   WBC 6.4  --  5.8 5.8   HGB 7.9* 7.4* 8.1* 8.6*   HCT 24.4* 22.5* 24.6* 26.4*   MCV 92.4  --  92.1 94.3     --  266 249         Recent Labs     07/16/24 2030 07/17/24  1255 07/17/24  1715 07/18/24  0440    133 132 135   K 3.3* 4.2 5.0 5.0   CL 97* 94* 95* 97*   CO2 28 29 27 24   CREATININE 9.4* 4.0* 4.3* 5.1*   BUN 53* 15 18 19   LABGLOM 5* 14* 13* 10*   GLUCOSE 27* 288* 436* 141*   CALCIUM 9.5 8.1* 8.2* 8.5*   PHOS 4.7*  --   --  3.8   MG 2.5  --   --  2.2         Vit D, 25-Hydroxy   Date Value Ref Range Status   12/05/2023 0.25  Corrected       No results found for: \"PTH\"    Recent Labs     07/16/24  2030   ALT 11   AST 16   ALKPHOS

## 2024-07-19 NOTE — PROGRESS NOTES
7/19/24  Diabetes education attempted to visit with patient. Pt states she is getting ready to be discharged, declined education at this time. States she has been diagnosed with diabetes since the age of 5, understands her meals, has a dexcom CGM at home and has no questions at this time, declining education. Provide patient with Survival Booklet and encouraged patient to call with any further questions.

## 2024-07-19 NOTE — PROGRESS NOTES
GENERAL SURGERY  DAILY PROGRESS NOTE  7/19/2024    Subjective:  No new issues. No abdominal pain. Discussed endoscopy findings. No further signs of bleeding     Objective:  BP (!) 148/80   Pulse 79   Temp 97.1 °F (36.2 °C) (Oral)   Resp 18   Ht 1.499 m (4' 11\")   Wt 63.5 kg (140 lb)   SpO2 100%   BMI 28.28 kg/m²     General Appearance:  awake, alert, oriented, in no acute distress  Skin:  Skin color, texture, turgor normal  Head/face:  NCAT  Eyes:  No gross abnormalities. Sclera nonicteric  Lungs/Chest:  Normal expansion. No respiratory distress. On   Heart: Warm throughout. Regular rate   Abdomen:  Soft, noon-distended.    Extremities: Extremities warm to touch, pink, with no edema.        Assessment/Plan:  39 y.o. female w/ melena; EGD w esophagitis, no signs of bleeding on colonoscopy     No further signs of bleeding  Continue PPI  Diet as tolerated  Please call back with questions or concerns       Electronically signed by Maria Teresa Zhang MD on 7/19/2024 at 7:27 AM

## 2024-07-19 NOTE — CARE COORDINATION
CM Update: Met with patient at bedside to discuss transition of care plan. Discharge plan is Home with O2 through [facility] on [place] T-TH-SAT chairtime 11 am, and no additional home health care needs. She uses her insurance for transport when needed. Patient came to hospital after [facility] found her Hbg be 5.6. BG was in the 20s at admission. Had EGD 7/18. CM/SW to follow. MT

## 2024-07-19 NOTE — ANESTHESIA POSTPROCEDURE EVALUATION
Department of Anesthesiology  Postprocedure Note    Patient: Michelle Nettles  MRN: [id number]  YOB: 1985  Date of evaluation: 7/18/2024    Procedure Summary       Date: 07/18/24 Room / Location: [place] [address] / [facility]    Anesthesia Start: 1450 Anesthesia Stop: 1555    Procedures:       ESOPHAGOGASTRODUODENOSCOPY BIOPSY      COLONOSCOPY DIAGNOSTIC Diagnosis:       Melena      (Melena [K92.1])    Surgeons: Balaji Gonzalez MD Responsible Provider: Koby Preciado DO    Anesthesia Type: MAC ASA Status: 4            Anesthesia Type: MAC    David Phase I: David Score: 9    David Phase II:      Anesthesia Post Evaluation    Patient location during evaluation: PACU  Patient participation: complete - patient participated  Level of consciousness: awake and alert  Pain score: 1  Airway patency: patent  Nausea & Vomiting: no nausea and no vomiting  Cardiovascular status: hemodynamically stable  Respiratory status: acceptable  Hydration status: euvolemic  Pain management: adequate    No notable events documented.

## 2024-07-19 NOTE — PROGRESS NOTES
[facility]  Nephrology Consult Note    Patient's Name: Michelle Nettles    Reason for Consult: Missed hemodialysis    Chief Complaint: Low hemoglobin  History Obtained From:  patient, past medical records, and EMR    History of Present Illness:    Michelle Nettles is a 39 y.o. female with a past medical history of ESRD, diabetes mellitus, hypertension.  She presented to the ED on 7/16 for concerns of low hemoglobin.  Vital signs on 7/16 includes temperature 98.3, respirations 16, pulse 110, /79, and she was 100% SpO2.  Lab data on 7/16 includes potassium 3.3, BUN 53, creatinine 9.4, glucose 27, and hemoglobin 7.9.  She had a chest x-ray on 7/16 which showed no acute process.  Nephrology has been consulted to see the patient for missed hemodialysis.  Patient known to our service and dialyzes as an outpatient at [facility] via left arm AV fistula.  At present, patient was seen and examined.  She is lethargic, wakes up and reports that she feels tired.  She denies any chest pain or shortness of breath.  She denies any abdominal pain, nausea, vomiting, or diarrhea.  She denies any fevers.    Subjective    7/18: Patient for colonoscopy and EGD today;, no nausea or emesis reported she denies abdominal pain    7/19:EGD and colonoscopy yesterday, results noted        Patient Active Problem List   Diagnosis    Poorly controlled type 1 diabetes mellitus (HCC)    HTN (hypertension), benign    Facial cellulitis    Acute hyperkalemia    Nasal polyp    Periorbital cellulitis of right eye    Periorbital cellulitis    Vitamin B deficiency, unspecified    Nasal congestion    Hidradenitis suppurativa    Symptomatic anemia    Normocytic anemia due to blood loss    Gall stone    Dietary noncompliance    Metabolic encephalopathy    Hypoxia    Hypotension    History of venous thromboembolism    Chronic systolic (congestive) heart failure    MRSA colonization    ESRD on hemodialysis (HCC)    SOB  >=1000 11/21/2010 10:30 PM    KETUA Negative 11/23/2017 10:05 PM    UROBILINOGEN 0.2 11/23/2017 10:05 PM    BILIRUBINUR Negative 11/23/2017 10:05 PM    BILIRUBINUR NEGATIVE 11/21/2010 10:30 PM       Lab Results   Component Value Date/Time    PROTEIN 8.0 07/16/2024 08:30 PM       No components found for: \"URIC\"    No results found for: \"LIPIDPAN\"    Assessment and Plans:    ESRD on HD  Outpatient [facility] via left arm AV fistula  Monitor labs  Continue HD while inpatient-HD today as missed HD yesterday    2.  Anemia  Concern for GI bleed  Hemoglobin target 10-12  Hemoglobin 8.6 today  Transfuse for hemoglobin<7  EGD and colonoscopy yesterday, results noted    3.  Hypertension with CKD 5/ESRD  BP goal<130/80  BP above goal  Monitor BPs on current regimen and with UF on HD    4.  Hypokalemia  Potassium 3.3 on 7/16  HD today on 4 K+ bath  Supplement potassium as needed  Monitor labs    5.  Diabetes mellitus  Appears uncontrolled as she has been hypo and hyperglycemic   Consider endocrinology consult  Defer to primary service    Procedure: Hemodialysis      Danial Gerard MD

## 2024-07-20 LAB
ABO/RH: NORMAL
ANTIBODY SCREEN: NEGATIVE
ARM BAND NUMBER: NORMAL
BLOOD BANK BLOOD PRODUCT EXPIRATION DATE: NORMAL
BLOOD BANK DISPENSE STATUS: NORMAL
BLOOD BANK ISBT PRODUCT BLOOD TYPE: 5100
BLOOD BANK PRODUCT CODE: NORMAL
BLOOD BANK SAMPLE EXPIRATION: NORMAL
BLOOD BANK UNIT TYPE AND RH: NORMAL
BPU ID: NORMAL
COMPONENT: NORMAL
CROSSMATCH RESULT: NORMAL
TRANSFUSION STATUS: NORMAL
UNIT DIVISION: 0
UNIT ISSUE DATE/TIME: NORMAL

## 2024-07-21 LAB
ACB COMPLEX DNA BLD POS QL NAA+NON-PROBE: NOT DETECTED
B FRAGILIS DNA BLD POS QL NAA+NON-PROBE: NOT DETECTED
BIOFIRE TEST COMMENT: NORMAL
C ALBICANS DNA BLD POS QL NAA+NON-PROBE: NOT DETECTED
C AURIS DNA BLD POS QL NAA+NON-PROBE: NOT DETECTED
C GATTII+NEOFOR DNA BLD POS QL NAA+N-PRB: NOT DETECTED
C GLABRATA DNA BLD POS QL NAA+NON-PROBE: NOT DETECTED
C KRUSEI DNA BLD POS QL NAA+NON-PROBE: NOT DETECTED
C PARAP DNA BLD POS QL NAA+NON-PROBE: NOT DETECTED
C TROPICLS DNA BLD POS QL NAA+NON-PROBE: NOT DETECTED
E CLOAC COMP DNA BLD POS NAA+NON-PROBE: NOT DETECTED
E COLI DNA BLD POS QL NAA+NON-PROBE: NOT DETECTED
E FAECALIS DNA BLD POS QL NAA+NON-PROBE: NOT DETECTED
E FAECIUM DNA BLD POS QL NAA+NON-PROBE: NOT DETECTED
ENTEROBACTERALES DNA BLD POS NAA+N-PRB: NOT DETECTED
GP B STREP DNA BLD POS QL NAA+NON-PROBE: NOT DETECTED
HAEM INFLU DNA BLD POS QL NAA+NON-PROBE: NOT DETECTED
K OXYTOCA DNA BLD POS QL NAA+NON-PROBE: NOT DETECTED
KLEBSIELLA SP DNA BLD POS QL NAA+NON-PRB: NOT DETECTED
KLEBSIELLA SP DNA BLD POS QL NAA+NON-PRB: NOT DETECTED
L MONOCYTOG DNA BLD POS QL NAA+NON-PROBE: NOT DETECTED
MICROORGANISM SPEC CULT: NORMAL
MICROORGANISM SPEC CULT: NORMAL
MICROORGANISM/AGENT SPEC: NORMAL
N MEN DNA BLD POS QL NAA+NON-PROBE: NOT DETECTED
P AERUGINOSA DNA BLD POS NAA+NON-PROBE: NOT DETECTED
PROTEUS SP DNA BLD POS QL NAA+NON-PROBE: NOT DETECTED
S AUREUS DNA BLD POS QL NAA+NON-PROBE: NOT DETECTED
S AUREUS+CONS DNA BLD POS NAA+NON-PROBE: NOT DETECTED
S EPIDERMIDIS DNA BLD POS QL NAA+NON-PRB: NOT DETECTED
S LUGDUNENSIS DNA BLD POS QL NAA+NON-PRB: NOT DETECTED
S MALTOPHILIA DNA BLD POS QL NAA+NON-PRB: NOT DETECTED
S MARCESCENS DNA BLD POS NAA+NON-PROBE: NOT DETECTED
S PNEUM DNA BLD POS QL NAA+NON-PROBE: NOT DETECTED
S PYO DNA BLD POS QL NAA+NON-PROBE: NOT DETECTED
SALMONELLA DNA BLD POS QL NAA+NON-PROBE: NOT DETECTED
SERVICE CMNT-IMP: NORMAL
SERVICE CMNT-IMP: NORMAL
SPECIMEN DESCRIPTION: NORMAL
SPECIMEN DESCRIPTION: NORMAL
STREPTOCOCCUS DNA BLD POS NAA+NON-PROBE: NOT DETECTED

## 2024-07-22 ENCOUNTER — CARE COORDINATION (OUTPATIENT)
Dept: CARE COORDINATION | Age: 39
End: 2024-07-22

## 2024-07-22 NOTE — CARE COORDINATION
Care Transitions Note    Initial Call - Call within 2 business days of discharge: Yes    Attempted to reach patient for transitions of care follow up. Unable to reach patient.    Outreach Attempts:   HIPAA compliant voicemail left for patient.     Patient: Michelle Nettles    Patient : 1985   MRN: [id number]    Reason for Admission: hypoglycemia   Discharge Date: 24  RURS: Readmission Risk Score: 20.2    Last Discharge Facility       Date Complaint Diagnosis Description Type Department Provider    24 Blood Sugar Problem Hypoglycemia ... ED to Hosp-Admission (Discharged) (ADMITTED) [facility] Della Nguyen MD; Sheldon Spears, ...     Follow Up Appointment:   Patient has hospital follow up appointment scheduled within 7 days of discharge.    Future Appointments         Provider Specialty Dept Phone    2024 12:30 PM Rogers Rodríguez MD Primary Care [phone]    2024 11:30 AM Brennan Dyer MD Endocrinology [phone]        Sarah Yin RN

## 2024-07-23 ENCOUNTER — CARE COORDINATION (OUTPATIENT)
Dept: CARE COORDINATION | Age: 39
End: 2024-07-23

## 2024-07-23 SDOH — HEALTH STABILITY: PHYSICAL HEALTH: ON AVERAGE, HOW MANY MINUTES DO YOU ENGAGE IN EXERCISE AT THIS LEVEL?: 0 MIN

## 2024-07-23 NOTE — CARE COORDINATION
Care Transitions Note    Initial Call - Call within 2 business days of discharge: Yes    Second and final attempt to reach the patient for initial Care Transition call post hospital discharge. HIPAA compliant message left with CTN's contact information requesting return phone call.   If no return call by the end of today, CTN will sign off and resolve CT program.   Excluded from Care Transition calls until 24 (one month) due to being unable to reach after two consecutive admissions.     Patient: Michelle Nettles    Patient : 1985   MRN: [id number]    Reason for Admission: hypoglycemia   Discharge Date: 24  RURS: Readmission Risk Score: 20.2    Last Discharge Facility       Date Complaint Diagnosis Description Type Department Provider    24 Blood Sugar Problem Hypoglycemia ... ED to Hosp-Admission (Discharged) (ADMITTED) [facility] Dlela Nguyen MD; Sheldon Spears, ...   Follow Up Appointment:   Patient has hospital follow up appointment scheduled within 7 days of discharge.    Future Appointments         Provider Specialty Dept Phone    2024 12:30 PM Rogers Rodríguez MD Primary Care [phone]    2024 11:30 AM Brennan Dyer MD Endocrinology [phone]        Sarah Yin RN

## 2024-07-24 LAB
ACB COMPLEX DNA BLD POS QL NAA+NON-PROBE: NOT DETECTED
B FRAGILIS DNA BLD POS QL NAA+NON-PROBE: NOT DETECTED
BIOFIRE TEST COMMENT: ABNORMAL
C ALBICANS DNA BLD POS QL NAA+NON-PROBE: NOT DETECTED
C AURIS DNA BLD POS QL NAA+NON-PROBE: NOT DETECTED
C GATTII+NEOFOR DNA BLD POS QL NAA+N-PRB: NOT DETECTED
C GLABRATA DNA BLD POS QL NAA+NON-PROBE: NOT DETECTED
C KRUSEI DNA BLD POS QL NAA+NON-PROBE: NOT DETECTED
C PARAP DNA BLD POS QL NAA+NON-PROBE: NOT DETECTED
C TROPICLS DNA BLD POS QL NAA+NON-PROBE: NOT DETECTED
E CLOAC COMP DNA BLD POS NAA+NON-PROBE: NOT DETECTED
E COLI DNA BLD POS QL NAA+NON-PROBE: NOT DETECTED
E FAECALIS DNA BLD POS QL NAA+NON-PROBE: NOT DETECTED
E FAECIUM DNA BLD POS QL NAA+NON-PROBE: NOT DETECTED
ENTEROBACTERALES DNA BLD POS NAA+N-PRB: NOT DETECTED
GP B STREP DNA BLD POS QL NAA+NON-PROBE: NOT DETECTED
HAEM INFLU DNA BLD POS QL NAA+NON-PROBE: NOT DETECTED
K OXYTOCA DNA BLD POS QL NAA+NON-PROBE: NOT DETECTED
KLEBSIELLA SP DNA BLD POS QL NAA+NON-PRB: NOT DETECTED
KLEBSIELLA SP DNA BLD POS QL NAA+NON-PRB: NOT DETECTED
L MONOCYTOG DNA BLD POS QL NAA+NON-PROBE: NOT DETECTED
MICROORGANISM SPEC CULT: ABNORMAL
MICROORGANISM/AGENT SPEC: ABNORMAL
N MEN DNA BLD POS QL NAA+NON-PROBE: NOT DETECTED
P AERUGINOSA DNA BLD POS NAA+NON-PROBE: NOT DETECTED
PROTEUS SP DNA BLD POS QL NAA+NON-PROBE: NOT DETECTED
S AUREUS DNA BLD POS QL NAA+NON-PROBE: NOT DETECTED
S AUREUS+CONS DNA BLD POS NAA+NON-PROBE: NOT DETECTED
S EPIDERMIDIS DNA BLD POS QL NAA+NON-PRB: NOT DETECTED
S LUGDUNENSIS DNA BLD POS QL NAA+NON-PRB: NOT DETECTED
S MALTOPHILIA DNA BLD POS QL NAA+NON-PRB: NOT DETECTED
S MARCESCENS DNA BLD POS NAA+NON-PROBE: NOT DETECTED
S PNEUM DNA BLD POS QL NAA+NON-PROBE: NOT DETECTED
S PYO DNA BLD POS QL NAA+NON-PROBE: NOT DETECTED
SALMONELLA DNA BLD POS QL NAA+NON-PROBE: NOT DETECTED
SERVICE CMNT-IMP: ABNORMAL
SPECIMEN DESCRIPTION: ABNORMAL
STREPTOCOCCUS DNA BLD POS NAA+NON-PROBE: NOT DETECTED

## 2024-07-26 ENCOUNTER — OFFICE VISIT (OUTPATIENT)
Dept: PRIMARY CARE CLINIC | Age: 39
End: 2024-07-26

## 2024-07-26 VITALS
HEIGHT: 59 IN | OXYGEN SATURATION: 100 % | TEMPERATURE: 97.7 F | HEART RATE: 98 BPM | SYSTOLIC BLOOD PRESSURE: 104 MMHG | WEIGHT: 139.4 LBS | BODY MASS INDEX: 28.1 KG/M2 | RESPIRATION RATE: 16 BRPM | DIASTOLIC BLOOD PRESSURE: 64 MMHG

## 2024-07-26 DIAGNOSIS — D64.9 ACUTE ON CHRONIC ANEMIA: ICD-10-CM

## 2024-07-26 DIAGNOSIS — E10.65 POORLY CONTROLLED TYPE 1 DIABETES MELLITUS (HCC): Primary | ICD-10-CM

## 2024-07-26 NOTE — PROGRESS NOTES
Michelle ORDONEZ Lencho Nettles  : 1985    Chief Complaint:     Chief Complaint   Patient presents with    New Patient     Hospital f/u Admit Date: 2024 Discharge Date:  2024 Anemia, Hypoglycemia. Endo provider out until September. Seeing Dr. Bloomberg (eye doctor) for annual DM eye exam in September.        HPI  Gerard, Nephro, HD TRS, anuric  DM1, [place]  Eye Dr Bloomberg   GYN Dr Montiel    Recent admission for acute anemia, hypoglycemia. Was sent from HD for anemia and found reportedly to be hypoglycemic. Notes frequent hypoglycemic episodes recently. Has CGM. Does not need refills. They did not adjust her insulin while IP.     Past medical, surgical, family and social histories reviewed and updated today as appropriate    Health Maintenance Due   Topic Date Due    Diabetic foot exam  Never done    Varicella vaccine (1 of 2 - 13+ 2-dose series) Never done    Cervical cancer screen  2014    Lipids  2022    Hepatitis B vaccine (1 of 1 - Risk Dialysis 4-dose series) 2022    COVID-19 Vaccine ( season) 2023    Annual Wellness Visit (Medicare Advantage)  2024    Breast cancer screen  Never done    Flu vaccine (1) 2024    Diabetic retinal exam  2024       Current Outpatient Medications   Medication Sig Dispense Refill    albuterol (PROVENTIL) (5 MG/ML) 0.5% nebulizer solution Take 1 mL by nebulization 4 times daily as needed for Wheezing 120 each 0    albuterol sulfate HFA (PROVENTIL;VENTOLIN;PROAIR) 108 (90 Base) MCG/ACT inhaler Inhale 2 puffs into the lungs every 4-6 hours as needed for Wheezing or Shortness of Breath 1 each 2    calcium acetate (PHOSLO) 667 MG CAPS capsule Take 4 capsules by mouth 3 times daily (with meals) 180 capsule 1    Insulin Glargine, 2 Unit Dial, (TOUJEO MAX SOLOSTAR) 300 UNIT/ML SOPN Inject 8 Units into the skin nightly 3 Adjustable Dose Pre-filled Pen Syringe 1    acetaminophen (TYLENOL) 500 MG tablet Take 1 tablet by mouth

## 2024-07-30 RX ORDER — FAMOTIDINE 20 MG/1
20 TABLET, FILM COATED ORAL EVERY MORNING
Qty: 30 TABLET | Refills: 0 | OUTPATIENT
Start: 2024-07-30

## 2024-07-31 ENCOUNTER — APPOINTMENT (OUTPATIENT)
Dept: GENERAL RADIOLOGY | Age: 39
DRG: 640 | End: 2024-07-31
Payer: MEDICARE

## 2024-07-31 ENCOUNTER — HOSPITAL ENCOUNTER (INPATIENT)
Age: 39
LOS: 7 days | Discharge: HOME | DRG: 640 | End: 2024-08-07
Attending: EMERGENCY MEDICINE | Admitting: HOSPITALIST
Payer: MEDICARE

## 2024-07-31 DIAGNOSIS — E87.70 HYPERVOLEMIA, UNSPECIFIED HYPERVOLEMIA TYPE: Primary | ICD-10-CM

## 2024-07-31 DIAGNOSIS — K92.2 GASTROINTESTINAL HEMORRHAGE, UNSPECIFIED GASTROINTESTINAL HEMORRHAGE TYPE: ICD-10-CM

## 2024-07-31 DIAGNOSIS — E87.5 HYPERKALEMIA: ICD-10-CM

## 2024-07-31 DIAGNOSIS — L02.01 FACIAL ABSCESS: ICD-10-CM

## 2024-07-31 DIAGNOSIS — E10.65 POORLY CONTROLLED TYPE 1 DIABETES MELLITUS (HCC): ICD-10-CM

## 2024-07-31 DIAGNOSIS — D64.9 ACUTE ON CHRONIC ANEMIA: ICD-10-CM

## 2024-07-31 LAB
ALBUMIN SERPL-MCNC: 3.7 G/DL (ref 3.5–5.2)
ALP SERPL-CCNC: 90 U/L (ref 35–104)
ALT SERPL-CCNC: 24 U/L (ref 0–32)
ANION GAP SERPL CALCULATED.3IONS-SCNC: 17 MMOL/L (ref 7–16)
AST SERPL-CCNC: 61 U/L (ref 0–31)
BASOPHILS # BLD: 0.02 K/UL (ref 0–0.2)
BASOPHILS NFR BLD: 0 % (ref 0–2)
BILIRUB SERPL-MCNC: 0.4 MG/DL (ref 0–1.2)
BUN SERPL-MCNC: 70 MG/DL (ref 6–20)
CALCIUM SERPL-MCNC: 8.7 MG/DL (ref 8.6–10.2)
CHLORIDE SERPL-SCNC: 99 MMOL/L (ref 98–107)
CO2 SERPL-SCNC: 20 MMOL/L (ref 22–29)
CREAT SERPL-MCNC: 12.3 MG/DL (ref 0.5–1)
EOSINOPHIL # BLD: 0.19 K/UL (ref 0.05–0.5)
EOSINOPHILS RELATIVE PERCENT: 3 % (ref 0–6)
ERYTHROCYTE [DISTWIDTH] IN BLOOD BY AUTOMATED COUNT: 17.1 % (ref 11.5–15)
GFR, ESTIMATED: 4 ML/MIN/1.73M2
GLUCOSE BLD-MCNC: 200 MG/DL (ref 74–99)
GLUCOSE BLD-MCNC: 286 MG/DL (ref 74–99)
GLUCOSE BLD-MCNC: 62 MG/DL (ref 74–99)
GLUCOSE BLD-MCNC: 87 MG/DL (ref 74–99)
GLUCOSE SERPL-MCNC: 56 MG/DL (ref 74–99)
HCT VFR BLD AUTO: 24.3 % (ref 34–48)
HGB BLD-MCNC: 8.2 G/DL (ref 11.5–15.5)
IMM GRANULOCYTES # BLD AUTO: 0.03 K/UL (ref 0–0.58)
IMM GRANULOCYTES NFR BLD: 0 % (ref 0–5)
LYMPHOCYTES NFR BLD: 0.92 K/UL (ref 1.5–4)
LYMPHOCYTES RELATIVE PERCENT: 13 % (ref 20–42)
MCH RBC QN AUTO: 31.8 PG (ref 26–35)
MCHC RBC AUTO-ENTMCNC: 33.7 G/DL (ref 32–34.5)
MCV RBC AUTO: 94.2 FL (ref 80–99.9)
MONOCYTES NFR BLD: 0.55 K/UL (ref 0.1–0.95)
MONOCYTES NFR BLD: 8 % (ref 2–12)
NEUTROPHILS NFR BLD: 75 % (ref 43–80)
NEUTS SEG NFR BLD: 5.19 K/UL (ref 1.8–7.3)
PLATELET # BLD AUTO: 256 K/UL (ref 130–450)
PMV BLD AUTO: 10.6 FL (ref 7–12)
POTASSIUM SERPL-SCNC: 6.7 MMOL/L (ref 3.5–5)
PROT SERPL-MCNC: 8.3 G/DL (ref 6.4–8.3)
RBC # BLD AUTO: 2.58 M/UL (ref 3.5–5.5)
SODIUM SERPL-SCNC: 136 MMOL/L (ref 132–146)
SURGICAL PATHOLOGY REPORT: NORMAL
WBC OTHER # BLD: 6.9 K/UL (ref 4.5–11.5)

## 2024-07-31 PROCEDURE — 82962 GLUCOSE BLOOD TEST: CPT

## 2024-07-31 PROCEDURE — 99223 1ST HOSP IP/OBS HIGH 75: CPT | Performed by: HOSPITALIST

## 2024-07-31 PROCEDURE — 71045 X-RAY EXAM CHEST 1 VIEW: CPT

## 2024-07-31 PROCEDURE — 99285 EMERGENCY DEPT VISIT HI MDM: CPT

## 2024-07-31 PROCEDURE — 80053 COMPREHEN METABOLIC PANEL: CPT

## 2024-07-31 PROCEDURE — 85025 COMPLETE CBC W/AUTO DIFF WBC: CPT

## 2024-07-31 PROCEDURE — 2500000003 HC RX 250 WO HCPCS: Performed by: EMERGENCY MEDICINE

## 2024-07-31 PROCEDURE — 6370000000 HC RX 637 (ALT 250 FOR IP): Performed by: EMERGENCY MEDICINE

## 2024-07-31 PROCEDURE — 93005 ELECTROCARDIOGRAM TRACING: CPT | Performed by: EMERGENCY MEDICINE

## 2024-07-31 PROCEDURE — 2060000000 HC ICU INTERMEDIATE R&B

## 2024-07-31 RX ORDER — CALCIUM GLUCONATE 10 MG/ML
1000 INJECTION, SOLUTION INTRAVENOUS ONCE
Status: COMPLETED | OUTPATIENT
Start: 2024-07-31 | End: 2024-08-01

## 2024-07-31 RX ORDER — DEXTROSE MONOHYDRATE 25 G/50ML
25 INJECTION, SOLUTION INTRAVENOUS ONCE
Status: COMPLETED | OUTPATIENT
Start: 2024-07-31 | End: 2024-07-31

## 2024-07-31 RX ADMIN — SODIUM ZIRCONIUM CYCLOSILICATE 10 G: 10 POWDER, FOR SUSPENSION ORAL at 23:42

## 2024-07-31 RX ADMIN — DEXTROSE MONOHYDRATE 12.5 G: 25 INJECTION, SOLUTION INTRAVENOUS at 23:56

## 2024-07-31 RX ADMIN — SODIUM BICARBONATE 50 MEQ: 84 INJECTION INTRAVENOUS at 23:42

## 2024-07-31 ASSESSMENT — LIFESTYLE VARIABLES: HOW OFTEN DO YOU HAVE A DRINK CONTAINING ALCOHOL: NEVER

## 2024-08-01 ENCOUNTER — APPOINTMENT (OUTPATIENT)
Dept: GENERAL RADIOLOGY | Age: 39
DRG: 640 | End: 2024-08-01
Payer: MEDICARE

## 2024-08-01 ENCOUNTER — APPOINTMENT (OUTPATIENT)
Age: 39
DRG: 640 | End: 2024-08-01
Payer: MEDICARE

## 2024-08-01 PROBLEM — E87.70 HYPERVOLEMIA: Status: ACTIVE | Noted: 2024-08-01

## 2024-08-01 PROBLEM — I10 PRIMARY HYPERTENSION: Status: ACTIVE | Noted: 2017-10-17

## 2024-08-01 LAB
ANION GAP SERPL CALCULATED.3IONS-SCNC: 18 MMOL/L (ref 7–16)
ANION GAP SERPL CALCULATED.3IONS-SCNC: 25 MMOL/L (ref 7–16)
B.E.: -5.7 MMOL/L (ref -3–3)
BASOPHILS # BLD: 0.02 K/UL (ref 0–0.2)
BASOPHILS NFR BLD: 0 % (ref 0–2)
BUN SERPL-MCNC: 75 MG/DL (ref 6–20)
BUN SERPL-MCNC: 77 MG/DL (ref 6–20)
CALCIUM SERPL-MCNC: 8.4 MG/DL (ref 8.6–10.2)
CALCIUM SERPL-MCNC: 8.4 MG/DL (ref 8.6–10.2)
CHLORIDE SERPL-SCNC: 95 MMOL/L (ref 98–107)
CHLORIDE SERPL-SCNC: 97 MMOL/L (ref 98–107)
CO2 SERPL-SCNC: 15 MMOL/L (ref 22–29)
CO2 SERPL-SCNC: 18 MMOL/L (ref 22–29)
COHB: 0.7 % (ref 0–1.5)
CREAT SERPL-MCNC: 13.4 MG/DL (ref 0.5–1)
CREAT SERPL-MCNC: 13.5 MG/DL (ref 0.5–1)
CRITICAL: ABNORMAL
CRITICAL: ABNORMAL
DATE ANALYZED: ABNORMAL
DATE ANALYZED: ABNORMAL
DATE OF COLLECTION: ABNORMAL
DATE OF COLLECTION: ABNORMAL
ECHO AO ASC DIAM: 2.4 CM
ECHO AV AREA PEAK VELOCITY: 2.2 CM2
ECHO AV AREA VTI: 2.1 CM2
ECHO AV CUSP MM: 1.8 CM
ECHO AV MEAN GRADIENT: 10 MMHG
ECHO AV MEAN VELOCITY: 1.5 M/S
ECHO AV PEAK GRADIENT: 16 MMHG
ECHO AV PEAK VELOCITY: 2 M/S
ECHO AV VELOCITY RATIO: 0.9
ECHO AV VTI: 39 CM
ECHO EST RA PRESSURE: 3 MMHG
ECHO LA DIAMETER: 3.6 CM
ECHO LA VOL A-L A2C: 52 ML (ref 22–52)
ECHO LA VOL A-L A4C: 56 ML (ref 22–52)
ECHO LA VOL MOD A2C: 50 ML (ref 22–52)
ECHO LA VOL MOD A4C: 54 ML (ref 22–52)
ECHO LA VOLUME AREA LENGTH: 56 ML
ECHO LV FRACTIONAL SHORTENING: 39 % (ref 28–44)
ECHO LV GLOBAL LONGITUDINAL STRAIN (GLS): -17.3 %
ECHO LV INTERNAL DIMENSION DIASTOLIC: 3.6 CM (ref 3.9–5.3)
ECHO LV INTERNAL DIMENSION SYSTOLIC: 2.2 CM
ECHO LV ISOVOLUMETRIC RELAXATION TIME (IVRT): 31.1 MS
ECHO LV IVSD: 1.4 CM (ref 0.6–0.9)
ECHO LV IVSS: 1.4 CM
ECHO LV MASS 2D: 179.9 G (ref 67–162)
ECHO LV POSTERIOR WALL DIASTOLIC: 1.4 CM (ref 0.6–0.9)
ECHO LV POSTERIOR WALL SYSTOLIC: 1.7 CM
ECHO LV RELATIVE WALL THICKNESS RATIO: 0.78
ECHO LVOT AREA: 2.5 CM2
ECHO LVOT AV VTI INDEX: 0.83
ECHO LVOT DIAM: 1.8 CM
ECHO LVOT MEAN GRADIENT: 6 MMHG
ECHO LVOT PEAK GRADIENT: 12 MMHG
ECHO LVOT PEAK VELOCITY: 1.8 M/S
ECHO LVOT SV: 82.7 ML
ECHO LVOT VTI: 32.5 CM
ECHO MV "A" WAVE DURATION: 100.4 MSEC
ECHO MV A VELOCITY: 1.37 M/S
ECHO MV AREA PHT: 3.7 CM2
ECHO MV AREA VTI: 3.3 CM2
ECHO MV E DECELERATION TIME (DT): 205.8 MS
ECHO MV E VELOCITY: 1.29 M/S
ECHO MV E/A RATIO: 0.94
ECHO MV LVOT VTI INDEX: 0.76
ECHO MV MAX VELOCITY: 1.7 M/S
ECHO MV MEAN GRADIENT: 7 MMHG
ECHO MV MEAN VELOCITY: 1.3 M/S
ECHO MV PEAK GRADIENT: 11 MMHG
ECHO MV PRESSURE HALF TIME (PHT): 59.1 MS
ECHO MV VTI: 24.8 CM
ECHO PV MAX VELOCITY: 1.3 M/S
ECHO PV MEAN GRADIENT: 4 MMHG
ECHO PV MEAN VELOCITY: 0.9 M/S
ECHO PV PEAK GRADIENT: 7 MMHG
ECHO PV VTI: 25.5 CM
ECHO PVEIN A DURATION: 90 MS
ECHO PVEIN A VELOCITY: 0.3 M/S
ECHO PVEIN PEAK D VELOCITY: 1 M/S
ECHO PVEIN PEAK S VELOCITY: 1 M/S
ECHO PVEIN S/D RATIO: 1
ECHO RV INTERNAL DIMENSION: 3.3 CM
EKG ATRIAL RATE: 110 BPM
EKG P AXIS: 47 DEGREES
EKG P-R INTERVAL: 116 MS
EKG Q-T INTERVAL: 324 MS
EKG QRS DURATION: 68 MS
EKG QTC CALCULATION (BAZETT): 438 MS
EKG R AXIS: 16 DEGREES
EKG T AXIS: 74 DEGREES
EKG VENTRICULAR RATE: 110 BPM
EOSINOPHIL # BLD: 0.16 K/UL (ref 0.05–0.5)
EOSINOPHILS RELATIVE PERCENT: 2 % (ref 0–6)
ERYTHROCYTE [DISTWIDTH] IN BLOOD BY AUTOMATED COUNT: 16.5 % (ref 11.5–15)
GFR, ESTIMATED: 3 ML/MIN/1.73M2
GFR, ESTIMATED: 3 ML/MIN/1.73M2
GLUCOSE BLD-MCNC: 148 MG/DL (ref 74–99)
GLUCOSE BLD-MCNC: 223 MG/DL (ref 74–99)
GLUCOSE BLD-MCNC: 260 MG/DL (ref 74–99)
GLUCOSE BLD-MCNC: 265 MG/DL (ref 74–99)
GLUCOSE BLD-MCNC: 335 MG/DL (ref 74–99)
GLUCOSE BLD-MCNC: 404 MG/DL (ref 74–99)
GLUCOSE BLD-MCNC: 416 MG/DL (ref 74–99)
GLUCOSE BLD-MCNC: 470 MG/DL (ref 74–99)
GLUCOSE SERPL-MCNC: 254 MG/DL (ref 74–99)
GLUCOSE SERPL-MCNC: 412 MG/DL (ref 74–99)
HCO3: 18.7 MMOL/L (ref 22–26)
HCT VFR BLD AUTO: 19.1 % (ref 34–48)
HCT VFR BLD AUTO: 21.2 % (ref 34–48)
HGB BLD-MCNC: 6.2 G/DL (ref 11.5–15.5)
HGB BLD-MCNC: 7.1 G/DL (ref 11.5–15.5)
HHB: 16.1 % (ref 0–5)
IMM GRANULOCYTES # BLD AUTO: 0.03 K/UL (ref 0–0.58)
IMM GRANULOCYTES NFR BLD: 0 % (ref 0–5)
LAB: ABNORMAL
LAB: ABNORMAL
LYMPHOCYTES NFR BLD: 0.9 K/UL (ref 1.5–4)
LYMPHOCYTES RELATIVE PERCENT: 12 % (ref 20–42)
Lab: 18
Lab: 18
MAGNESIUM SERPL-MCNC: 2.4 MG/DL (ref 1.6–2.6)
MCH RBC QN AUTO: 30.5 PG (ref 26–35)
MCHC RBC AUTO-ENTMCNC: 32.5 G/DL (ref 32–34.5)
MCV RBC AUTO: 94.1 FL (ref 80–99.9)
METHB: 0.5 % (ref 0–1.5)
MODE: ABNORMAL
MONOCYTES NFR BLD: 0.59 K/UL (ref 0.1–0.95)
MONOCYTES NFR BLD: 8 % (ref 2–12)
NEUTROPHILS NFR BLD: 78 % (ref 43–80)
NEUTS SEG NFR BLD: 5.89 K/UL (ref 1.8–7.3)
O2 SATURATION: 83.7 % (ref 92–98.5)
O2HB: 82.7 % (ref 94–97)
OPERATOR ID: 2860
OPERATOR ID: 2860
PATIENT TEMP: 37 C
PATIENT TEMP: 37 C
PCO2: 32.7 MMHG (ref 35–45)
PH BLOOD GAS: 7.38 (ref 7.35–7.45)
PLATELET # BLD AUTO: 216 K/UL (ref 130–450)
PMV BLD AUTO: 9.9 FL (ref 7–12)
PO2: 55 MMHG (ref 75–100)
POTASSIUM SERPL-SCNC: 5.04 MMOL/L (ref 3.5–5)
POTASSIUM SERPL-SCNC: 5.4 MMOL/L (ref 3.5–5)
POTASSIUM SERPL-SCNC: 6.5 MMOL/L (ref 3.5–5)
RBC # BLD AUTO: 2.03 M/UL (ref 3.5–5.5)
SODIUM SERPL-SCNC: 133 MMOL/L (ref 132–146)
SODIUM SERPL-SCNC: 135 MMOL/L (ref 132–146)
SOURCE, BLOOD GAS: ABNORMAL
SOURCE, BLOOD GAS: ABNORMAL
THB: 8.7 G/DL (ref 11.5–16.5)
TIME ANALYZED: 23
TIME ANALYZED: 30
TSH SERPL DL<=0.05 MIU/L-ACNC: 2.69 UIU/ML (ref 0.27–4.2)
WBC OTHER # BLD: 7.6 K/UL (ref 4.5–11.5)

## 2024-08-01 PROCEDURE — 36415 COLL VENOUS BLD VENIPUNCTURE: CPT

## 2024-08-01 PROCEDURE — 2580000003 HC RX 258: Performed by: HOSPITALIST

## 2024-08-01 PROCEDURE — 71045 X-RAY EXAM CHEST 1 VIEW: CPT

## 2024-08-01 PROCEDURE — 82805 BLOOD GASES W/O2 SATURATION: CPT

## 2024-08-01 PROCEDURE — 2500000003 HC RX 250 WO HCPCS: Performed by: HOSPITALIST

## 2024-08-01 PROCEDURE — 90935 HEMODIALYSIS ONE EVALUATION: CPT

## 2024-08-01 PROCEDURE — 84443 ASSAY THYROID STIM HORMONE: CPT

## 2024-08-01 PROCEDURE — 5A09357 ASSISTANCE WITH RESPIRATORY VENTILATION, LESS THAN 24 CONSECUTIVE HOURS, CONTINUOUS POSITIVE AIRWAY PRESSURE: ICD-10-PCS | Performed by: INTERNAL MEDICINE

## 2024-08-01 PROCEDURE — 6370000000 HC RX 637 (ALT 250 FOR IP): Performed by: EMERGENCY MEDICINE

## 2024-08-01 PROCEDURE — 82962 GLUCOSE BLOOD TEST: CPT

## 2024-08-01 PROCEDURE — 86901 BLOOD TYPING SEROLOGIC RH(D): CPT

## 2024-08-01 PROCEDURE — 85018 HEMOGLOBIN: CPT

## 2024-08-01 PROCEDURE — 94660 CPAP INITIATION&MGMT: CPT

## 2024-08-01 PROCEDURE — 85025 COMPLETE CBC W/AUTO DIFF WBC: CPT

## 2024-08-01 PROCEDURE — 6370000000 HC RX 637 (ALT 250 FOR IP): Performed by: HOSPITALIST

## 2024-08-01 PROCEDURE — P9016 RBC LEUKOCYTES REDUCED: HCPCS

## 2024-08-01 PROCEDURE — 99222 1ST HOSP IP/OBS MODERATE 55: CPT | Performed by: NURSE PRACTITIONER

## 2024-08-01 PROCEDURE — 6370000000 HC RX 637 (ALT 250 FOR IP): Performed by: STUDENT IN AN ORGANIZED HEALTH CARE EDUCATION/TRAINING PROGRAM

## 2024-08-01 PROCEDURE — 2060000000 HC ICU INTERMEDIATE R&B

## 2024-08-01 PROCEDURE — 93356 MYOCRD STRAIN IMG SPCKL TRCK: CPT | Performed by: INTERNAL MEDICINE

## 2024-08-01 PROCEDURE — 86850 RBC ANTIBODY SCREEN: CPT

## 2024-08-01 PROCEDURE — 93010 ELECTROCARDIOGRAM REPORT: CPT | Performed by: INTERNAL MEDICINE

## 2024-08-01 PROCEDURE — 2500000003 HC RX 250 WO HCPCS: Performed by: EMERGENCY MEDICINE

## 2024-08-01 PROCEDURE — 83735 ASSAY OF MAGNESIUM: CPT

## 2024-08-01 PROCEDURE — 86923 COMPATIBILITY TEST ELECTRIC: CPT

## 2024-08-01 PROCEDURE — 93306 TTE W/DOPPLER COMPLETE: CPT

## 2024-08-01 PROCEDURE — 30233N1 TRANSFUSION OF NONAUTOLOGOUS RED BLOOD CELLS INTO PERIPHERAL VEIN, PERCUTANEOUS APPROACH: ICD-10-PCS | Performed by: INTERNAL MEDICINE

## 2024-08-01 PROCEDURE — 84132 ASSAY OF SERUM POTASSIUM: CPT

## 2024-08-01 PROCEDURE — 99232 SBSQ HOSP IP/OBS MODERATE 35: CPT | Performed by: STUDENT IN AN ORGANIZED HEALTH CARE EDUCATION/TRAINING PROGRAM

## 2024-08-01 PROCEDURE — 86900 BLOOD TYPING SEROLOGIC ABO: CPT

## 2024-08-01 PROCEDURE — 93306 TTE W/DOPPLER COMPLETE: CPT | Performed by: INTERNAL MEDICINE

## 2024-08-01 PROCEDURE — 85014 HEMATOCRIT: CPT

## 2024-08-01 PROCEDURE — 99222 1ST HOSP IP/OBS MODERATE 55: CPT | Performed by: INTERNAL MEDICINE

## 2024-08-01 PROCEDURE — 80048 BASIC METABOLIC PNL TOTAL CA: CPT

## 2024-08-01 PROCEDURE — APPSS60 APP SPLIT SHARED TIME 46-60 MINUTES

## 2024-08-01 RX ORDER — ALBUTEROL SULFATE 2.5 MG/3ML
5 SOLUTION RESPIRATORY (INHALATION) 4 TIMES DAILY PRN
Status: DISCONTINUED | OUTPATIENT
Start: 2024-08-01 | End: 2024-08-07 | Stop reason: HOSPADM

## 2024-08-01 RX ORDER — SODIUM CHLORIDE 9 MG/ML
INJECTION, SOLUTION INTRAVENOUS PRN
Status: DISCONTINUED | OUTPATIENT
Start: 2024-08-01 | End: 2024-08-07 | Stop reason: HOSPADM

## 2024-08-01 RX ORDER — ONDANSETRON 4 MG/1
4 TABLET, ORALLY DISINTEGRATING ORAL EVERY 8 HOURS PRN
Status: DISCONTINUED | OUTPATIENT
Start: 2024-08-01 | End: 2024-08-07 | Stop reason: HOSPADM

## 2024-08-01 RX ORDER — ACETAMINOPHEN 325 MG/1
650 TABLET ORAL EVERY 6 HOURS PRN
Status: DISCONTINUED | OUTPATIENT
Start: 2024-08-01 | End: 2024-08-07 | Stop reason: HOSPADM

## 2024-08-01 RX ORDER — ACETAMINOPHEN 500 MG
500 TABLET ORAL EVERY 6 HOURS PRN
Status: DISCONTINUED | OUTPATIENT
Start: 2024-08-01 | End: 2024-08-01 | Stop reason: ALTCHOICE

## 2024-08-01 RX ORDER — SODIUM CHLORIDE 0.9 % (FLUSH) 0.9 %
5-40 SYRINGE (ML) INJECTION PRN
Status: DISCONTINUED | OUTPATIENT
Start: 2024-08-01 | End: 2024-08-07 | Stop reason: HOSPADM

## 2024-08-01 RX ORDER — CLOPIDOGREL BISULFATE 75 MG/1
75 TABLET ORAL DAILY
Status: DISCONTINUED | OUTPATIENT
Start: 2024-08-01 | End: 2024-08-07 | Stop reason: HOSPADM

## 2024-08-01 RX ORDER — MULTIVITAMIN
800 TABLET ORAL DAILY
COMMUNITY

## 2024-08-01 RX ORDER — INSULIN LISPRO 100 [IU]/ML
0-4 INJECTION, SOLUTION INTRAVENOUS; SUBCUTANEOUS NIGHTLY
Status: DISCONTINUED | OUTPATIENT
Start: 2024-08-01 | End: 2024-08-07 | Stop reason: HOSPADM

## 2024-08-01 RX ORDER — FAMOTIDINE 20 MG/1
20 TABLET, FILM COATED ORAL DAILY
Status: ON HOLD | COMMUNITY
End: 2024-08-05 | Stop reason: HOSPADM

## 2024-08-01 RX ORDER — INSULIN LISPRO 100 [IU]/ML
0-4 INJECTION, SOLUTION INTRAVENOUS; SUBCUTANEOUS
Status: DISCONTINUED | OUTPATIENT
Start: 2024-08-01 | End: 2024-08-07 | Stop reason: HOSPADM

## 2024-08-01 RX ORDER — ALBUTEROL SULFATE 2.5 MG/3ML
2.5 SOLUTION RESPIRATORY (INHALATION) EVERY 6 HOURS PRN
Status: ON HOLD | COMMUNITY
End: 2024-08-05 | Stop reason: HOSPADM

## 2024-08-01 RX ORDER — PANTOPRAZOLE SODIUM 40 MG/1
40 TABLET, DELAYED RELEASE ORAL
Status: DISCONTINUED | OUTPATIENT
Start: 2024-08-01 | End: 2024-08-03

## 2024-08-01 RX ORDER — CALCIUM ACETATE 667 MG/1
4 CAPSULE ORAL
Status: DISCONTINUED | OUTPATIENT
Start: 2024-08-01 | End: 2024-08-07 | Stop reason: HOSPADM

## 2024-08-01 RX ORDER — INSULIN LISPRO 100 [IU]/ML
4 INJECTION, SOLUTION INTRAVENOUS; SUBCUTANEOUS
Status: DISCONTINUED | OUTPATIENT
Start: 2024-08-01 | End: 2024-08-02

## 2024-08-01 RX ORDER — ACETAMINOPHEN 650 MG/1
650 SUPPOSITORY RECTAL EVERY 6 HOURS PRN
Status: DISCONTINUED | OUTPATIENT
Start: 2024-08-01 | End: 2024-08-07 | Stop reason: HOSPADM

## 2024-08-01 RX ORDER — INSULIN LISPRO 100 [IU]/ML
4 INJECTION, SOLUTION INTRAVENOUS; SUBCUTANEOUS
COMMUNITY

## 2024-08-01 RX ORDER — INSULIN GLARGINE 100 [IU]/ML
10 INJECTION, SOLUTION SUBCUTANEOUS DAILY
Status: DISCONTINUED | OUTPATIENT
Start: 2024-08-01 | End: 2024-08-04

## 2024-08-01 RX ORDER — LANOLIN ALCOHOL/MO/W.PET/CERES
500 CREAM (GRAM) TOPICAL DAILY
Status: DISCONTINUED | OUTPATIENT
Start: 2024-08-01 | End: 2024-08-07 | Stop reason: HOSPADM

## 2024-08-01 RX ORDER — POLYETHYLENE GLYCOL 3350 17 G/17G
17 POWDER, FOR SOLUTION ORAL DAILY PRN
Status: DISCONTINUED | OUTPATIENT
Start: 2024-08-01 | End: 2024-08-05

## 2024-08-01 RX ORDER — ONDANSETRON 2 MG/ML
4 INJECTION INTRAMUSCULAR; INTRAVENOUS EVERY 6 HOURS PRN
Status: DISCONTINUED | OUTPATIENT
Start: 2024-08-01 | End: 2024-08-07 | Stop reason: HOSPADM

## 2024-08-01 RX ORDER — SODIUM CHLORIDE 0.9 % (FLUSH) 0.9 %
5-40 SYRINGE (ML) INJECTION EVERY 12 HOURS SCHEDULED
Status: DISCONTINUED | OUTPATIENT
Start: 2024-08-01 | End: 2024-08-07 | Stop reason: HOSPADM

## 2024-08-01 RX ORDER — CARVEDILOL 25 MG/1
25 TABLET ORAL
Status: DISCONTINUED | OUTPATIENT
Start: 2024-08-01 | End: 2024-08-07 | Stop reason: HOSPADM

## 2024-08-01 RX ADMIN — INSULIN GLARGINE 10 UNITS: 100 INJECTION, SOLUTION SUBCUTANEOUS at 10:46

## 2024-08-01 RX ADMIN — CALCIUM ACETATE 2668 MG: 667 CAPSULE ORAL at 13:58

## 2024-08-01 RX ADMIN — INSULIN LISPRO 4 UNITS: 100 INJECTION, SOLUTION INTRAVENOUS; SUBCUTANEOUS at 17:00

## 2024-08-01 RX ADMIN — INSULIN HUMAN 4 UNITS: 100 INJECTION, SOLUTION PARENTERAL at 07:21

## 2024-08-01 RX ADMIN — INSULIN HUMAN 5 UNITS: 100 INJECTION, SOLUTION PARENTERAL at 00:00

## 2024-08-01 RX ADMIN — SODIUM CHLORIDE, PRESERVATIVE FREE 10 ML: 5 INJECTION INTRAVENOUS at 20:43

## 2024-08-01 RX ADMIN — CALCIUM ACETATE 2668 MG: 667 CAPSULE ORAL at 17:00

## 2024-08-01 RX ADMIN — INSULIN HUMAN 5 UNITS: 100 INJECTION, SOLUTION PARENTERAL at 05:51

## 2024-08-01 RX ADMIN — CARVEDILOL 25 MG: 25 TABLET, FILM COATED ORAL at 17:30

## 2024-08-01 RX ADMIN — PANTOPRAZOLE SODIUM 40 MG: 40 TABLET, DELAYED RELEASE ORAL at 13:58

## 2024-08-01 RX ADMIN — CALCIUM GLUCONATE 1000 MG: 10 INJECTION, SOLUTION INTRAVENOUS at 00:04

## 2024-08-01 RX ADMIN — CYANOCOBALAMIN TAB 1000 MCG 500 MCG: 1000 TAB at 13:58

## 2024-08-01 RX ADMIN — CLOPIDOGREL BISULFATE 75 MG: 75 TABLET ORAL at 13:58

## 2024-08-01 RX ADMIN — INSULIN LISPRO 4 UNITS: 100 INJECTION, SOLUTION INTRAVENOUS; SUBCUTANEOUS at 20:46

## 2024-08-01 RX ADMIN — SODIUM CHLORIDE, PRESERVATIVE FREE 10 ML: 5 INJECTION INTRAVENOUS at 13:59

## 2024-08-01 ASSESSMENT — PAIN SCALES - GENERAL: PAINLEVEL_OUTOF10: 0

## 2024-08-01 NOTE — PLAN OF CARE
Problem: Discharge Planning  Goal: Discharge to home or other facility with appropriate resources  Outcome: HH/HSPC Not Progressing     Problem: ABCDS Injury Assessment  Goal: Absence of physical injury  Outcome: HH/HSPC Not Progressing

## 2024-08-01 NOTE — CONSENT
Informed Consent for Blood Component Transfusion Note    I have discussed with the patient the rationale for blood component transfusion; its benefits in treating or preventing fatigue, organ damage, or death; and its risk which includes mild transfusion reactions, rare risk of blood borne infection, or more serious but rare reactions. I have discussed the alternatives to transfusion, including the risk and consequences of not receiving transfusion. The patient had an opportunity to ask questions and had agreed to proceed with transfusion of blood components.    Electronically signed by Trenton Peters MD on 8/1/24 at 11:18 AM EDT

## 2024-08-01 NOTE — ACP (ADVANCE CARE PLANNING)
Advance Care Planning   Healthcare Decision Maker:    Primary Decision Maker: Deana Murillo - Brother/Sister - [phone]    Secondary Decision Maker: Cain Musa - Brother/Sister - [phone]    Supplemental (Other) Decision Maker: Nicole Musa - Other - [phone]    Supplemental (Other) Decision Maker: Barbie Celeste - Brother/Sister - [phone]    Supplemental (Other) Decision Maker: Santosh Pink - Aunt/Uncle - [phone]    Click here to complete Healthcare Decision Makers including selection of the Healthcare Decision Maker Relationship (ie \"Primary\").

## 2024-08-01 NOTE — FLOWSHEET NOTE
08/01/24 1330   Vital Signs   BP (!) 112/59   Temp 98 °F (36.7 °C)   Pulse (!) 121   Respirations 18   Weight - Scale   (On ED cart)   Post-Hemodialysis Assessment   Post-Treatment Procedures Blood returned;Access bleeding time < 10 minutes   Machine Disinfection Process Acid/Vinegar Clean;Heat Disinfect;Exterior Machine Disinfection   Rinseback Volume (ml) 300 ml   Blood Volume Processed (Liters) 98.7 L   Dialyzer Clearance Lightly streaked   Duration of Treatment (minutes) 240 minutes   Heparin Amount Administered During Treatment (mL) 0 mL   Hemodialysis Intake (ml) 700 ml   Hemodialysis Output (ml) 3700 ml   NET Removed (ml) 3000   Tolerated Treatment Good   Patient Response to Treatment Completed prescribed treatment w/o complications. Ran on 2K2.5 dialysate for 4 hours. 1 unit prbcs given with treatment (repeat cbc Hgb 6.2). Alert/responsive at tx's end.   Patient Disposition Remain in ICU/ED

## 2024-08-01 NOTE — H&P
Hospital Medicine History & Physical      PCP: Rogers Rodríguez MD    Date of Admission: 7/31/2024    Date of Service: Pt seen/examined on 7/31/2024 and is  admitted to Inpatient with expected LOS greater than two midnights due to medical therapy.      Chief Complaint:  had concerns including Shortness of Breath (Increased SOB over the past 2 days Missed dialysis last TX was thur. ).    History Of Present Illness:    Ms. Michelle Nettles, a 39 y.o. year old female  with PMH of ESRD on dialysis, anemia, type 1 diabetes, HTN, chronic respiratory failure with home oxygen, and HFpEF    Pt presented to ED for evaluation of shortness of breath.  Patient reports she was supposed to go to dialysis today, she was too weak to show up, she has been feeling more shortness of breath despite of being on home oxygen, patient was seen dyspneic on nasal cannula oxygen in the ED, appears to be elevated tachypneic as well, she denies fever, chills, cough, she denies any chest pain, denies nausea vomiting denies diarrhea.  Patient feels her arms and legs are getting more swollen.      I have personally reviewed and interpreted the Initial workups as summarized below:     WBC normal, H/H stable at 8.2/24.3, platelet stable in normal range  Renal function potassium 6.7, with bicarb 20    CXR  reviewed,Cardiomegaly with mild/moderate bilateral perihilar and interstitial  pulmonary edema, similar to the prior examination of July 16, 2024.Cardiomegaly with mild/moderate bilateral perihilar and interstitial  pulmonary edema, similar to the prior examination of July 16, 2024.      EKG reviewed sinus rhythm with mild sinus tachycardia with no acute ST/T wave ischemic change.  Last ECHO was performed in June 2023 with LVEF 60 to 65%   Summary   Estimated left ventricle ejection fraction 60 to 65 %.   Moderate asymmetric septal hypertrophy.   Left ventricular diastolic filling is elevated .   Normal right ventricular size and

## 2024-08-01 NOTE — ED PROVIDER NOTES
JOSÉ LUIS Nettles is a 39 y.o. female history of end-stage renal disease history of hemodialysis hypertension tobacco use diabetes history of end-stage renal disease diabetes hypertension history of tobacco use presenting to the ED for shortness of breath missed dialysis, beginning since last Thursday ago.  The complaint has been persistent, moderate in severity, and worsened by light exertion.  Patient reported that she missed dialysis and has not had dialysis since Thursday she was supposed to have a the day before but unable to go due to family emergency.  Patient reporting some diarrhea she reports no cough she does report some shortness of breath she reports also wound on her right cheek.  Patient reports he believes she has an abscess there.  Patient reporting she is on oxygen at home.  Patient reporting no black tarry stools she reports no hematemesis.  Patient reporting no productive cough.  Patient awake alert orient x 3 heart exam normal lungs are clear abdomen soft patient does have edema bilaterally.  Patient does have noted abscess wound to right cheek.  Patient neurologically intact.  Patient differential includes electro disorder as well as heart failure as well as pulm edema as well as PE    Patient had IV established.  Patient was ordered Lokelma as well as bicarb as well as insulin as well as D50 patient lab work white count was 6.9 hemoglobin 8.2 platelet count 256 sodium was 136 potassium 6.7 patient's glucose is 56 BUN is 70 creatinine is 12.3 alk phos is 90 patient's ABG pH was 7.37 pCO2 was 32 pO2 was 55 O2 sat was 83.  Patient repeat blood sugar was 200.  Patient EKG sinus tachycardia chest x-ray showed cardiomegaly with moderate perihilar and interstitial pulmonary edema.  Patient's pulse ox stable on supplemental oxygen.  Patient does wear oxygen at home.  Patient made aware of findings and plan I did speak to nephrology they will follow-up in the a.m.  Patient will be admitted to internal

## 2024-08-02 LAB
ANION GAP SERPL CALCULATED.3IONS-SCNC: 12 MMOL/L (ref 7–16)
BASOPHILS # BLD: 0.06 K/UL (ref 0–0.2)
BASOPHILS NFR BLD: 1 % (ref 0–2)
BUN SERPL-MCNC: 24 MG/DL (ref 6–20)
CALCIUM SERPL-MCNC: 8.5 MG/DL (ref 8.6–10.2)
CHLORIDE SERPL-SCNC: 92 MMOL/L (ref 98–107)
CO2 SERPL-SCNC: 29 MMOL/L (ref 22–29)
CREAT SERPL-MCNC: 3 MG/DL (ref 0.5–1)
EOSINOPHIL # BLD: 0.19 K/UL (ref 0.05–0.5)
EOSINOPHILS RELATIVE PERCENT: 3 % (ref 0–6)
ERYTHROCYTE [DISTWIDTH] IN BLOOD BY AUTOMATED COUNT: 16.9 % (ref 11.5–15)
FERRITIN SERPL-MCNC: 504 NG/ML
GFR, ESTIMATED: 19 ML/MIN/1.73M2
GLUCOSE BLD-MCNC: 330 MG/DL (ref 74–99)
GLUCOSE BLD-MCNC: 355 MG/DL (ref 74–99)
GLUCOSE BLD-MCNC: 404 MG/DL (ref 74–99)
GLUCOSE SERPL-MCNC: 258 MG/DL (ref 74–99)
HCT VFR BLD AUTO: 18.4 % (ref 34–48)
HCT VFR BLD AUTO: 21.6 % (ref 34–48)
HCT VFR BLD AUTO: 24.3 % (ref 34–48)
HGB BLD-MCNC: 6.1 G/DL (ref 11.5–15.5)
HGB BLD-MCNC: 7.2 G/DL (ref 11.5–15.5)
HGB BLD-MCNC: 7.9 G/DL (ref 11.5–15.5)
IRON SATN MFR SERPL: 40 % (ref 15–50)
IRON SERPL-MCNC: 60 UG/DL (ref 37–145)
LYMPHOCYTES NFR BLD: 0.69 K/UL (ref 1.5–4)
LYMPHOCYTES RELATIVE PERCENT: 10 % (ref 20–42)
MCH RBC QN AUTO: 29.6 PG (ref 26–35)
MCHC RBC AUTO-ENTMCNC: 33.2 G/DL (ref 32–34.5)
MCV RBC AUTO: 89.3 FL (ref 80–99.9)
MONOCYTES NFR BLD: 0.13 K/UL (ref 0.1–0.95)
MONOCYTES NFR BLD: 2 % (ref 2–12)
NEUTROPHILS NFR BLD: 85 % (ref 43–80)
NEUTS SEG NFR BLD: 6.03 K/UL (ref 1.8–7.3)
PLATELET # BLD AUTO: 173 K/UL (ref 130–450)
PMV BLD AUTO: 9.6 FL (ref 7–12)
POTASSIUM SERPL-SCNC: 3.5 MMOL/L (ref 3.5–5)
RBC # BLD AUTO: 2.06 M/UL (ref 3.5–5.5)
RBC # BLD: ABNORMAL 10*6/UL
SODIUM SERPL-SCNC: 133 MMOL/L (ref 132–146)
WBC OTHER # BLD: 7.1 K/UL (ref 4.5–11.5)

## 2024-08-02 PROCEDURE — 99233 SBSQ HOSP IP/OBS HIGH 50: CPT | Performed by: INTERNAL MEDICINE

## 2024-08-02 PROCEDURE — 92610 EVALUATE SWALLOWING FUNCTION: CPT

## 2024-08-02 PROCEDURE — 36430 TRANSFUSION BLD/BLD COMPNT: CPT

## 2024-08-02 PROCEDURE — 85014 HEMATOCRIT: CPT

## 2024-08-02 PROCEDURE — 2500000003 HC RX 250 WO HCPCS: Performed by: HOSPITALIST

## 2024-08-02 PROCEDURE — 85018 HEMOGLOBIN: CPT

## 2024-08-02 PROCEDURE — P9016 RBC LEUKOCYTES REDUCED: HCPCS

## 2024-08-02 PROCEDURE — 87070 CULTURE OTHR SPECIMN AEROBIC: CPT

## 2024-08-02 PROCEDURE — 6370000000 HC RX 637 (ALT 250 FOR IP)

## 2024-08-02 PROCEDURE — 0J910ZZ DRAINAGE OF FACE SUBCUTANEOUS TISSUE AND FASCIA, OPEN APPROACH: ICD-10-PCS | Performed by: SURGERY

## 2024-08-02 PROCEDURE — 82728 ASSAY OF FERRITIN: CPT

## 2024-08-02 PROCEDURE — 99232 SBSQ HOSP IP/OBS MODERATE 35: CPT | Performed by: STUDENT IN AN ORGANIZED HEALTH CARE EDUCATION/TRAINING PROGRAM

## 2024-08-02 PROCEDURE — 99222 1ST HOSP IP/OBS MODERATE 55: CPT | Performed by: INTERNAL MEDICINE

## 2024-08-02 PROCEDURE — 2060000000 HC ICU INTERMEDIATE R&B

## 2024-08-02 PROCEDURE — 80048 BASIC METABOLIC PNL TOTAL CA: CPT

## 2024-08-02 PROCEDURE — 94660 CPAP INITIATION&MGMT: CPT

## 2024-08-02 PROCEDURE — 85025 COMPLETE CBC W/AUTO DIFF WBC: CPT

## 2024-08-02 PROCEDURE — 94640 AIRWAY INHALATION TREATMENT: CPT

## 2024-08-02 PROCEDURE — 83550 IRON BINDING TEST: CPT

## 2024-08-02 PROCEDURE — 6370000000 HC RX 637 (ALT 250 FOR IP): Performed by: INTERNAL MEDICINE

## 2024-08-02 PROCEDURE — 6370000000 HC RX 637 (ALT 250 FOR IP): Performed by: HOSPITALIST

## 2024-08-02 PROCEDURE — 90935 HEMODIALYSIS ONE EVALUATION: CPT

## 2024-08-02 PROCEDURE — 36415 COLL VENOUS BLD VENIPUNCTURE: CPT

## 2024-08-02 PROCEDURE — 6370000000 HC RX 637 (ALT 250 FOR IP): Performed by: STUDENT IN AN ORGANIZED HEALTH CARE EDUCATION/TRAINING PROGRAM

## 2024-08-02 PROCEDURE — 82962 GLUCOSE BLOOD TEST: CPT

## 2024-08-02 PROCEDURE — 86403 PARTICLE AGGLUT ANTBDY SCRN: CPT

## 2024-08-02 PROCEDURE — 83540 ASSAY OF IRON: CPT

## 2024-08-02 PROCEDURE — 87205 SMEAR GRAM STAIN: CPT

## 2024-08-02 PROCEDURE — 87075 CULTR BACTERIA EXCEPT BLOOD: CPT

## 2024-08-02 RX ORDER — IPRATROPIUM BROMIDE AND ALBUTEROL SULFATE 2.5; .5 MG/3ML; MG/3ML
1 SOLUTION RESPIRATORY (INHALATION)
Status: DISCONTINUED | OUTPATIENT
Start: 2024-08-02 | End: 2024-08-04

## 2024-08-02 RX ORDER — OXYCODONE HYDROCHLORIDE 5 MG/1
5 TABLET ORAL EVERY 4 HOURS PRN
Status: DISCONTINUED | OUTPATIENT
Start: 2024-08-02 | End: 2024-08-07 | Stop reason: HOSPADM

## 2024-08-02 RX ORDER — SODIUM CHLORIDE 9 MG/ML
INJECTION, SOLUTION INTRAVENOUS PRN
Status: DISCONTINUED | OUTPATIENT
Start: 2024-08-02 | End: 2024-08-07 | Stop reason: HOSPADM

## 2024-08-02 RX ORDER — INSULIN LISPRO 100 [IU]/ML
8 INJECTION, SOLUTION INTRAVENOUS; SUBCUTANEOUS
Status: DISCONTINUED | OUTPATIENT
Start: 2024-08-02 | End: 2024-08-04

## 2024-08-02 RX ADMIN — INSULIN LISPRO 4 UNITS: 100 INJECTION, SOLUTION INTRAVENOUS; SUBCUTANEOUS at 12:56

## 2024-08-02 RX ADMIN — IPRATROPIUM BROMIDE AND ALBUTEROL SULFATE 1 DOSE: 2.5; .5 SOLUTION RESPIRATORY (INHALATION) at 20:47

## 2024-08-02 RX ADMIN — INSULIN LISPRO 4 UNITS: 100 INJECTION, SOLUTION INTRAVENOUS; SUBCUTANEOUS at 22:03

## 2024-08-02 RX ADMIN — CALCIUM ACETATE 2668 MG: 667 CAPSULE ORAL at 16:52

## 2024-08-02 RX ADMIN — INSULIN LISPRO 8 UNITS: 100 INJECTION, SOLUTION INTRAVENOUS; SUBCUTANEOUS at 16:53

## 2024-08-02 RX ADMIN — INSULIN LISPRO 3 UNITS: 100 INJECTION, SOLUTION INTRAVENOUS; SUBCUTANEOUS at 16:53

## 2024-08-02 RX ADMIN — OXYCODONE 5 MG: 5 TABLET ORAL at 21:50

## 2024-08-02 RX ADMIN — CALCIUM ACETATE 2668 MG: 667 CAPSULE ORAL at 12:56

## 2024-08-02 RX ADMIN — IPRATROPIUM BROMIDE AND ALBUTEROL SULFATE 1 DOSE: 2.5; .5 SOLUTION RESPIRATORY (INHALATION) at 17:14

## 2024-08-02 RX ADMIN — CARVEDILOL 25 MG: 25 TABLET, FILM COATED ORAL at 16:53

## 2024-08-02 NOTE — CONSULTS
Palliative Care Department  [phone]  Palliative Care Initial Consult  Provider KEMI Bill CNP      PATIENT: Michelle Nettles  : 1985  MRN: [id number]  ADMISSION DATE: 2024 11:06 PM  Referring Provider: Trenton Peters MD     Palliative Medicine was consulted on hospital day 1 for assistance with Goals of care    HPI:     Clinical Summary:Michelle Nettles is a 39 y.o. y/o female with a history of ESRD on hemodialysis, hypertension, diabetes type 1, HFp EF who presented to [facility] on 2024 with shortness of breath after missed dialysis.  Chest x-ray showed moderate pulmonary edema.  Blood work was significant for a potassium of 6.5, creatinine 13.4, glucose 412, hemoglobin 6.2.  She was given dialysis in the ER.  She will be admitted for further medical management.    ASSESSMENT/PLAN:     Pertinent Hospital Diagnoses     ESRD on hemodialysis  Hyperkalemia  Hypervolemia  Acute on chronic hypoxic respiratory failure    Palliative Care Encounter / Counseling Regarding Goals of Care  Please see detailed goals of care discussion as below  At this time, Michelle Nettles, Does have capacity for medical decision-making.  Capacity is time limited and situation/question specific  Outcome of goals of care meeting:  Continue all current medical management  Patient expressing that she does need some help in the home  Continue full code  Code status Full Code  Advanced Directives: no POA or living will in epic  Surrogate/Legal NOK:  Deana Murillo (sister) [phone]     Spiritual assessment: no spiritual distress identified  Bereavement and grief: to be determined  Referrals to: none today    Thank you for the opportunity to participate in the care of Michelle Nettles.     KEMI Bill CNP   Palliative Medicine     SUBJECTIVE:     Details of Conversation: Chart review  Met with the patient at the bedside.  She was alert and oriented and able to express 
[facility]  Nephrology Consult Note    Patient's Name: Michelle Nettles    Reason for Consult:  ESRD on HD     Chief Complaint:  shortness of breath   History Obtained From:  patient, past medical records, and EMR    History of Present Illness:    Michelle Nettles is a 39 y.o. female with a past medical history of T1DM, ESRD, hypertension.  She presented to the ED on  for concerns of shortness of breath.  Vital signs on  includes temperature 97.8, respirations 26, pulse 111, /71, she was 96% SpO2. Lab data on  includes potassium 6.7 (hemolyzed), CO2 20, BUN 70, creatinine 12.3, anion gap 17, glucose 56, and hemoglobin 8.2.  She had a chest x-ray on  which showed cardiomegaly with mild/moderate bilateral perihilar and interstitial pulmonary edema.  Nephrology has been consulted to see the patient for ESRD on HD.  Patient is known to our service and dialyzes as an outpatient at [facility] via left arm AV fistula.  Her last outpatient HD was reportedly .  At present, patient was seen and examined.  She notes that she had shortness of breath which was progressively getting worse.  She denies any chest pain.  She denies any fevers.  She denies any cough or sore throat.    PMH:    Past Medical History:   Diagnosis Date    JOLLY (acute kidney injury) ([facility]) 2016    AVF (arteriovenous fistula) ([facility]) 2018    let arm    Chronic kidney disease     Dialysis AV fistula malfunction, initial encounter ([facility]) 2019    DM type 1 (diabetes mellitus, type 1) ([facility])     Encounter regarding vascular access for dialysis for ESRD ([facility]) 2018    ESRD (end stage renal disease) ([facility])     Hemodialysis patient ([facility])     amrita dawson mon wed fri / graft in left arm    Hypertension     Tobacco abuse 2016       Past Surgical History:   Procedure Laterality Date     SECTION      COLONOSCOPY N/A 2024    COLONOSCOPY DIAGNOSTIC performed by Balaji Gonzalez 
 No unanticipated weight loss.      No change in sleep pattern or activity.      No fevers, chills or rigors.    Eyes:    No visual changes or diplopia.      No scleral icterus.  ENT:    No Headaches, hearing loss or vertigo.      No mouth sores or sore throat.   Cardiovascular:  No chest discomfort, palpitations.  Respiratory:  No cough, No wheezing      Positive for dyspnea upon exertion  Gastrointestinal:  No abdominal pain, appetite loss, blood in stools.      No hematemesis  Genitourinary:  No dysuria, trouble voiding or hematuria.      No nocturia.  Musculoskeletal:   No weakness or joint complaints.  Integumentary: No rashes or pruritis.  Neurological:  No headache, numbness or tingling.     Psychiatric:   No effect on mood, memory, mentation, or behavior.     No anxiety or depression.  Endocrine:   No excessive thirst, fluid intake, or urination.      No tremor.  Hematologic: No abnormal bruising or bleeding.  Lymphatic:  No swollen lymph nodes.  Immunologic:  No hives or hx of anaphylaxis      OBJECTIVE:     PHYSICAL EXAM:   VITALS:   Vitals:    08/02/24 0608 08/02/24 1130 08/02/24 1356 08/02/24 1409   BP: 127/65 (!) 149/75 (!) 140/80 130/70   Pulse: 97 (!) 106 (!) 110 99   Resp: 18 18 16 16   Temp: 97 °F (36.1 °C) 97.3 °F (36.3 °C) 98.1 °F (36.7 °C) 98 °F (36.7 °C)   TempSrc: Temporal      SpO2: 97%  97% 95%        Intake/Output Summary (Last 24 hours) at 8/2/2024 1515  Last data filed at 8/2/2024 1130  Gross per 24 hour   Intake 600 ml   Output 2600 ml   Net -2000 ml        CONSTITUTIONAL:   A&O x 3, NAD  SKIN:     No rash, No suspicious lesions, No skin discoloration  HEENT:     EOMI, MMM, No thrush  NECK:    No bruits, No JVP appreciated  CV:      Sinus,  No murmur, No rubs, No gallops  PULMONARY:   Breath sounds diminished bilaterally  ABDOMEN:     Soft, non-tender. BS normal. No R/R/G  EXT:    No deformities .  No clubbing.       2+ lower extremity edema, No venous stasis  PULSE:   Appears equal and 
imaging:            ASSESSMENT/PLAN:    39 y.o. female with R facial abscess     - plan for bedside I&D  -Discussed with Dr. Carlos Barger DO  General Surgery Resident, PGY-1    Electronically signed by Luis Barger DO on 8/2/24 at 6:44 PM EDT    
  Vaping Use    Vaping Use: Never used   Substance and Sexual Activity    Alcohol use: Not Currently     Alcohol/week: 0.0 standard drinks of alcohol    Drug use: Not Currently     Types: Marijuana (Weed)    Sexual activity: Not on file   Other Topics Concern    Not on file   Social History Narrative    ** Merged History Encounter **          Social Determinants of Health     Financial Resource Strain: Medium Risk (3/31/2023)    Overall Financial Resource Strain (CARDIA)     Difficulty of Paying Living Expenses: Somewhat hard   Food Insecurity: Food Insecurity Present (7/17/2024)    Hunger Vital Sign     Worried About Running Out of Food in the Last Year: Sometimes true     Ran Out of Food in the Last Year: Sometimes true   Transportation Needs: No Transportation Needs (7/17/2024)    PRAPARE - Transportation     Lack of Transportation (Medical): No     Lack of Transportation (Non-Medical): No   Physical Activity: Unknown (7/23/2024)    Exercise Vital Sign     Days of Exercise per Week: Patient declined     Minutes of Exercise per Session: 0 min   Stress: Not on file   Social Connections: Not on file   Intimate Partner Violence: Not on file   Housing Stability: Low Risk  (7/17/2024)    Housing Stability Vital Sign     Unable to Pay for Housing in the Last Year: No     Number of Places Lived in the Last Year: 1     Unstable Housing in the Last Year: No       Family History:   Family History   Problem Relation Age of Onset    Stroke Mother     Cancer Father     Diabetes Paternal Aunt          REVIEW OF SYSTEMS:     As per HPI    PHYSICAL EXAM:   /60   Pulse (!) 130   Temp 98.4 °F (36.9 °C) (Temporal)   Resp 20   SpO2 96%   CONST:  Well developed, obese 39-year-old -American who appears stated age. Awake, alert, cooperative, no apparent distress  HEENT:   Head- Normocephalic, atraumatic   Eyes- Conjunctivae pink, anicteric  Throat- Oral mucosa pink and moist  Neck-  No stridor, trachea midline, jugular 
Additional Deep Sutures: 3-0 PDS

## 2024-08-02 NOTE — ACP (ADVANCE CARE PLANNING)
Advance Care Planning     Palliative Team Advance Care Planning (ACP) Conversation    Date of Conversation: 08/02/24    Individuals present for the conversation: Patient with decision making capacity     ACP documents on file prior to discussion:  -None    Previously completed document/s not on file: Patient / participant reports that there are no previously executed ACP documents.    Healthcare Decision Maker:    Primary Decision Maker: LidiaDeana - Brother/Sister - [phone]    Secondary Decision Maker: MusaCain - Brother/Sister - [phone]    Supplemental (Other) Decision Maker: Nicole Musa - Other - [phone]    Supplemental (Other) Decision Maker: Barbie Celeste - Brother/Sister - [phone]    Supplemental (Other) Decision Maker: Santosh Pink - Aunt/Uncle - [phone]     Conversation Summary:  LSW met with pt to provide forms and review process to complete advance directives. Pt states she is considering appointing her brother as her HCPOA.  Pt requested to review the forms on her own and will call if she decides to complete them.    Resuscitation Status:   Code Status: Full Code     Documentation Completed:  -No new documents completed.    I spent 20 minutes with the patient and/or surrogate decision maker discussing the patient's wishes and goals.      MARCELO Hartley

## 2024-08-02 NOTE — PLAN OF CARE
Problem: Discharge Planning  Goal: Discharge to home or other facility with appropriate resources  8/1/2024 2145 by Kimberly Pulido, RN  Outcome: Progressing  8/1/2024 1451 by Anne Lo RN  Outcome: HH/HSPC Not Progressing     Problem: ABCDS Injury Assessment  Goal: Absence of physical injury  8/1/2024 2145 by Kimberly Pulido, RN  Outcome: Progressing  8/1/2024 1451 by Anne Lo RN  Outcome: HH/HSPC Not Progressing

## 2024-08-02 NOTE — FLOWSHEET NOTE
08/02/24 1130   Vital Signs   BP (!) 149/75   Temp 97.3 °F (36.3 °C)   Pulse (!) 106   Respirations 18   Post-Hemodialysis Assessment   Post-Treatment Procedures Blood returned;Access bleeding time < 10 minutes   Machine Disinfection Process Acid/Vinegar Clean;Heat Disinfect   Rinseback Volume (ml) 300 ml   Blood Volume Processed (Liters) 94.3 L   Dialyzer Clearance Lightly streaked   Duration of Treatment (minutes) 240 minutes   Heparin Amount Administered During Treatment (mL) 0 mL   Hemodialysis Intake (ml) 600 ml   Hemodialysis Output (ml) 2600 ml   NET Removed (ml) 2000   Patient Response to Treatment tolerated tx well.  Clotted a system within the first 2 hours.   Bilateral Breath Sounds Diminished   Time Off 1110   Patient Disposition Return to room

## 2024-08-03 LAB
ABO/RH: NORMAL
ANION GAP SERPL CALCULATED.3IONS-SCNC: 15 MMOL/L (ref 7–16)
ANTIBODY SCREEN: NEGATIVE
ARM BAND NUMBER: NORMAL
BASOPHILS # BLD: 0.01 K/UL (ref 0–0.2)
BASOPHILS NFR BLD: 0 % (ref 0–2)
BLOOD BANK BLOOD PRODUCT EXPIRATION DATE: NORMAL
BLOOD BANK BLOOD PRODUCT EXPIRATION DATE: NORMAL
BLOOD BANK DISPENSE STATUS: NORMAL
BLOOD BANK DISPENSE STATUS: NORMAL
BLOOD BANK ISBT PRODUCT BLOOD TYPE: 5100
BLOOD BANK ISBT PRODUCT BLOOD TYPE: 9500
BLOOD BANK PRODUCT CODE: NORMAL
BLOOD BANK PRODUCT CODE: NORMAL
BLOOD BANK SAMPLE EXPIRATION: NORMAL
BLOOD BANK UNIT TYPE AND RH: NORMAL
BLOOD BANK UNIT TYPE AND RH: NORMAL
BPU ID: NORMAL
BPU ID: NORMAL
BUN SERPL-MCNC: 32 MG/DL (ref 6–20)
CALCIUM SERPL-MCNC: 8.5 MG/DL (ref 8.6–10.2)
CHLORIDE SERPL-SCNC: 91 MMOL/L (ref 98–107)
CO2 SERPL-SCNC: 25 MMOL/L (ref 22–29)
COMPONENT: NORMAL
COMPONENT: NORMAL
CREAT SERPL-MCNC: 4.2 MG/DL (ref 0.5–1)
CROSSMATCH RESULT: NORMAL
CROSSMATCH RESULT: NORMAL
EOSINOPHIL # BLD: 0.28 K/UL (ref 0.05–0.5)
EOSINOPHILS RELATIVE PERCENT: 3 % (ref 0–6)
ERYTHROCYTE [DISTWIDTH] IN BLOOD BY AUTOMATED COUNT: 17.4 % (ref 11.5–15)
GFR, ESTIMATED: 13 ML/MIN/1.73M2
GLUCOSE BLD-MCNC: 125 MG/DL (ref 74–99)
GLUCOSE BLD-MCNC: 193 MG/DL (ref 74–99)
GLUCOSE BLD-MCNC: 226 MG/DL (ref 74–99)
GLUCOSE BLD-MCNC: 282 MG/DL (ref 74–99)
GLUCOSE BLD-MCNC: 44 MG/DL (ref 74–99)
GLUCOSE BLD-MCNC: 83 MG/DL (ref 74–99)
GLUCOSE SERPL-MCNC: 286 MG/DL (ref 74–99)
HCT VFR BLD AUTO: 21.6 % (ref 34–48)
HCT VFR BLD AUTO: 21.7 % (ref 34–48)
HGB BLD-MCNC: 7.2 G/DL (ref 11.5–15.5)
HGB BLD-MCNC: 7.3 G/DL (ref 11.5–15.5)
IMM GRANULOCYTES # BLD AUTO: <0.03 K/UL (ref 0–0.58)
IMM GRANULOCYTES NFR BLD: 0 % (ref 0–5)
LYMPHOCYTES NFR BLD: 1.04 K/UL (ref 1.5–4)
LYMPHOCYTES RELATIVE PERCENT: 12 % (ref 20–42)
MAGNESIUM SERPL-MCNC: 1.9 MG/DL (ref 1.6–2.6)
MCH RBC QN AUTO: 28.9 PG (ref 26–35)
MCHC RBC AUTO-ENTMCNC: 33.3 G/DL (ref 32–34.5)
MCV RBC AUTO: 86.7 FL (ref 80–99.9)
MONOCYTES NFR BLD: 0.5 K/UL (ref 0.1–0.95)
MONOCYTES NFR BLD: 6 % (ref 2–12)
NEUTROPHILS NFR BLD: 79 % (ref 43–80)
NEUTS SEG NFR BLD: 6.79 K/UL (ref 1.8–7.3)
PHOSPHATE SERPL-MCNC: 4.1 MG/DL (ref 2.5–4.5)
PLATELET # BLD AUTO: 184 K/UL (ref 130–450)
PMV BLD AUTO: 10 FL (ref 7–12)
POTASSIUM SERPL-SCNC: 3.7 MMOL/L (ref 3.5–5)
RBC # BLD AUTO: 2.49 M/UL (ref 3.5–5.5)
SODIUM SERPL-SCNC: 131 MMOL/L (ref 132–146)
TRANSFUSION STATUS: NORMAL
TRANSFUSION STATUS: NORMAL
UNIT DIVISION: 0
UNIT DIVISION: 0
UNIT ISSUE DATE/TIME: NORMAL
UNIT ISSUE DATE/TIME: NORMAL
WBC OTHER # BLD: 8.6 K/UL (ref 4.5–11.5)

## 2024-08-03 PROCEDURE — 2580000003 HC RX 258: Performed by: HOSPITALIST

## 2024-08-03 PROCEDURE — 94660 CPAP INITIATION&MGMT: CPT

## 2024-08-03 PROCEDURE — 6370000000 HC RX 637 (ALT 250 FOR IP): Performed by: STUDENT IN AN ORGANIZED HEALTH CARE EDUCATION/TRAINING PROGRAM

## 2024-08-03 PROCEDURE — 6370000000 HC RX 637 (ALT 250 FOR IP)

## 2024-08-03 PROCEDURE — 82962 GLUCOSE BLOOD TEST: CPT

## 2024-08-03 PROCEDURE — 84100 ASSAY OF PHOSPHORUS: CPT

## 2024-08-03 PROCEDURE — 2060000000 HC ICU INTERMEDIATE R&B

## 2024-08-03 PROCEDURE — 83735 ASSAY OF MAGNESIUM: CPT

## 2024-08-03 PROCEDURE — 90935 HEMODIALYSIS ONE EVALUATION: CPT

## 2024-08-03 PROCEDURE — 85025 COMPLETE CBC W/AUTO DIFF WBC: CPT

## 2024-08-03 PROCEDURE — 6370000000 HC RX 637 (ALT 250 FOR IP): Performed by: INTERNAL MEDICINE

## 2024-08-03 PROCEDURE — 85018 HEMOGLOBIN: CPT

## 2024-08-03 PROCEDURE — 99232 SBSQ HOSP IP/OBS MODERATE 35: CPT | Performed by: STUDENT IN AN ORGANIZED HEALTH CARE EDUCATION/TRAINING PROGRAM

## 2024-08-03 PROCEDURE — 99233 SBSQ HOSP IP/OBS HIGH 50: CPT | Performed by: INTERNAL MEDICINE

## 2024-08-03 PROCEDURE — 94640 AIRWAY INHALATION TREATMENT: CPT

## 2024-08-03 PROCEDURE — 80048 BASIC METABOLIC PNL TOTAL CA: CPT

## 2024-08-03 PROCEDURE — 6370000000 HC RX 637 (ALT 250 FOR IP): Performed by: HOSPITALIST

## 2024-08-03 PROCEDURE — 2500000003 HC RX 250 WO HCPCS: Performed by: HOSPITALIST

## 2024-08-03 PROCEDURE — 85014 HEMATOCRIT: CPT

## 2024-08-03 RX ORDER — GLUCAGON 1 MG/ML
1 KIT INJECTION PRN
Status: DISCONTINUED | OUTPATIENT
Start: 2024-08-03 | End: 2024-08-07 | Stop reason: HOSPADM

## 2024-08-03 RX ORDER — PANTOPRAZOLE SODIUM 40 MG/1
40 TABLET, DELAYED RELEASE ORAL
Status: DISCONTINUED | OUTPATIENT
Start: 2024-08-03 | End: 2024-08-04

## 2024-08-03 RX ORDER — DEXTROSE MONOHYDRATE 100 MG/ML
INJECTION, SOLUTION INTRAVENOUS CONTINUOUS PRN
Status: DISCONTINUED | OUTPATIENT
Start: 2024-08-03 | End: 2024-08-07 | Stop reason: HOSPADM

## 2024-08-03 RX ADMIN — PANTOPRAZOLE SODIUM 40 MG: 40 TABLET, DELAYED RELEASE ORAL at 16:20

## 2024-08-03 RX ADMIN — INSULIN LISPRO 8 UNITS: 100 INJECTION, SOLUTION INTRAVENOUS; SUBCUTANEOUS at 17:30

## 2024-08-03 RX ADMIN — SODIUM CHLORIDE, PRESERVATIVE FREE 10 ML: 5 INJECTION INTRAVENOUS at 12:54

## 2024-08-03 RX ADMIN — CARVEDILOL 25 MG: 25 TABLET, FILM COATED ORAL at 16:20

## 2024-08-03 RX ADMIN — OXYCODONE 5 MG: 5 TABLET ORAL at 13:01

## 2024-08-03 RX ADMIN — IPRATROPIUM BROMIDE AND ALBUTEROL SULFATE 1 DOSE: 2.5; .5 SOLUTION RESPIRATORY (INHALATION) at 15:26

## 2024-08-03 RX ADMIN — CYANOCOBALAMIN TAB 1000 MCG 500 MCG: 1000 TAB at 12:54

## 2024-08-03 RX ADMIN — INSULIN GLARGINE 10 UNITS: 100 INJECTION, SOLUTION SUBCUTANEOUS at 12:54

## 2024-08-03 RX ADMIN — OXYCODONE 5 MG: 5 TABLET ORAL at 05:53

## 2024-08-03 RX ADMIN — CALCIUM ACETATE 2668 MG: 667 CAPSULE ORAL at 12:53

## 2024-08-03 RX ADMIN — PANTOPRAZOLE SODIUM 40 MG: 40 TABLET, DELAYED RELEASE ORAL at 05:53

## 2024-08-03 RX ADMIN — Medication 16 G: at 19:31

## 2024-08-03 RX ADMIN — INSULIN LISPRO 8 UNITS: 100 INJECTION, SOLUTION INTRAVENOUS; SUBCUTANEOUS at 12:55

## 2024-08-03 RX ADMIN — INSULIN LISPRO 2 UNITS: 100 INJECTION, SOLUTION INTRAVENOUS; SUBCUTANEOUS at 06:51

## 2024-08-03 RX ADMIN — CALCIUM ACETATE 2668 MG: 667 CAPSULE ORAL at 16:20

## 2024-08-03 RX ADMIN — INSULIN LISPRO 8 UNITS: 100 INJECTION, SOLUTION INTRAVENOUS; SUBCUTANEOUS at 06:51

## 2024-08-03 RX ADMIN — INSULIN LISPRO 1 UNITS: 100 INJECTION, SOLUTION INTRAVENOUS; SUBCUTANEOUS at 12:55

## 2024-08-03 ASSESSMENT — PAIN DESCRIPTION - DESCRIPTORS: DESCRIPTORS: THROBBING

## 2024-08-03 ASSESSMENT — PAIN DESCRIPTION - ORIENTATION: ORIENTATION: RIGHT

## 2024-08-03 ASSESSMENT — PAIN DESCRIPTION - PAIN TYPE: TYPE: ACUTE PAIN

## 2024-08-03 ASSESSMENT — PAIN SCALES - GENERAL
PAINLEVEL_OUTOF10: 7
PAINLEVEL_OUTOF10: 8

## 2024-08-03 ASSESSMENT — PAIN DESCRIPTION - LOCATION
LOCATION: FACE
LOCATION: FACE

## 2024-08-03 NOTE — PROCEDURES
PROCEDURE NOTE  Date: 8/2/2024   Name: Michelle Nettles  YOB: 1985    Procedures        Incision and Drainage Procedure Note    Indication: Facial abscess      Procedure: The skin over the left face was prepped with betadine and draped in the usual sterile fashion. . Local anesthesia over this lesion was obtained by infiltration using 1% lidocaine.  An incision was then made over the area of most fluctuance down through the subcutaneous tissue. Dissection was carried down to the level of subcutaneous fat. Approximately 5 cc of pururlent material was expressed. Cultures were obtained using a sterile swab . Loculations were broken down , and approximately 2 cc of additional purulent material was then able to be expressed. The drainage cavity was then irrigated with normal saline. The wound was packed with iodform and covered with a dressing of clean gauze.    The abscess measured 4 cm x 2 cm x 2 cm    The patient tolerated the procedure well.    Complications: None    Dr. Gonzalez was available for the procedure    Electronically signed by Chase Melendez DO on 8/2/2024 at 9:01 PM&

## 2024-08-03 NOTE — FLOWSHEET NOTE
08/03/24 1110   Vital Signs   /60   Temp (!) 96.6 °F (35.9 °C)   Pulse 80   Respirations 18   Weight - Scale 63.8 kg (140 lb 10.5 oz)   Weight Method Bed scale   Percent Weight Change 0   Pain Assessment   Pain Assessment 0-10   Pain Level 7   Pain Type Acute pain   Pain Location Face   Post-Hemodialysis Assessment   Post-Treatment Procedures Blood returned;Access bleeding time < 10 minutes   Machine Disinfection Process Exterior Machine Disinfection   Rinseback Volume (ml) 300 ml   Blood Volume Processed (Liters) 98.6 L   Dialyzer Clearance Lightly streaked   Duration of Treatment (minutes) 240 minutes   Heparin Amount Administered During Treatment (mL) 0 mL   Hemodialysis Intake (ml) 300 ml   Hemodialysis Output (ml) 3000 ml   NET Removed (ml) 2700   Tolerated Treatment Good   Patient Response to Treatment tolerated well   Bilateral Breath Sounds Diminished   Edema Facial   RLE Edema +1   LLE Edema +1   Facial Edema +3   Physician Notified No   Time Off 1108   Patient Disposition Return to room   Observations & Evaluations   Level of Consciousness 0   Oriented X 3

## 2024-08-03 NOTE — PLAN OF CARE
Problem: Discharge Planning  Goal: Discharge to home or other facility with appropriate resources  Outcome: Progressing     Problem: ABCDS Injury Assessment  Goal: Absence of physical injury  Outcome: Progressing     Problem: Pain  Goal: Verbalizes/displays adequate comfort level or baseline comfort level  Outcome: Progressing      Written/documented by Janis Trivedi, acting as a scribe in Dr. Phuong Andre presence. CC/HPI: The patient is a 60year old male here with his wife today for follow-up visit status postleft heel incision and drainage on 23 during hospitalization at [facility]. Today, he reports he is feeling not the greatest since his last office visit. His wife reports he is still taking his antibiotics. His most recent A1c is 9.8% on 10/19/23. Nature: None ;  Pain scale: Not stated  Duration/Onset:  DOS: R 1st 22, R 2nd 16, L 1st 11/3/15, L 3rd 14  Location: Right foot  Course: Healing  Aggravating factors: Not stated  Treatments: PWB surgical shoe, dressings with medihoney, Keflex, Single crutch, Wound vac,  PCP: Ant Gomez MD       REVIEW OF SYSTEMS  Per HPI, remaining 12 point review of systems negative,    No past medical history on file. Social History     Socioeconomic History    Marital status: /Civil Union     Spouse name: Not on file    Number of children: Not on file    Years of education: Not on file    Highest education level: Not on file   Occupational History    Not on file   Tobacco Use    Smoking status: Former     Current packs/day: 0.00     Types: Cigarettes     Quit date: 3/16/2010     Years since quittin.7    Smokeless tobacco: Never   Substance and Sexual Activity    Alcohol use: No    Drug use: Not on file    Sexual activity: Not on file   Other Topics Concern    Not on file   Social History Narrative    Not on file     Social Determinants of Health     Financial Resource Strain: Not on file   Food Insecurity: Not on file   Transportation Needs: Not on file   Physical Activity: Not on file   Stress: Not on file   Social Connections: Not on file   Intimate Partner Violence: Not on file     No family history on file.   Past Surgical History:   Procedure Laterality Date    Toe amputation Right 2022    great toe     Current Outpatient Medications   Medication Sig "Dispense Refill    metFORMIN (GLUCOPHAGE) 500 MG tablet Take 1,000 mg by mouth.      metformin (GLUCOPHAGE) 1000 MG tablet Take 1 tablet by mouth in the morning and 1 tablet in the evening. Take with meals. 180 tablet 3    insulin lispro (HumaLOG KwikPen) 200 UNIT/ML pen-injector 10 units before each meal, max 30 units daily 30 mL 3    losartan (COZAAR) 25 MG tablet Take 1 tablet by mouth daily. 90 tablet 3    empagliflozin (Jardiance) 25 MG tablet Take 1 tablet by mouth daily (before breakfast). 90 tablet 3    atorvastatin (LIPITOR) 20 MG tablet Take 1 tablet by mouth daily. 90 tablet 3    HYDROcodone-acetaminophen (NORCO) 5-325 MG per tablet       sarahi Killiangn, 100 UNIT/ML Solution Pen-injector       Continuous Blood Gluc  (FreeStyle Yessenia 2 [place]) Device Use as directed to monitor blood sugar 1 each 0    Continuous Blood Gluc Sensor (FreeStyle Yessenia 2 Sensor) Misc Change sensor every 14 days 6 each 3    Continuous Blood Gluc Transmit (Dexcom G6 Transmitter) Misc 1 each every 3 months. 1 each 3    insulin glargine (Lantus SoloStar) 100 UNIT/ML pen-injector Inject 25 Units into the skin nightly. Prime 2 units before each dose. (Patient taking differently: Inject 30 Units into the skin nightly. Prime 2 units before each dose.) 25 mL 3    clotrimazole (LOTRIMIN) 1 % cream Apply to affected area(s) once a day 30 g 0    Insulin Pen Needle (B-D U/F PEN NEEDLE 5/16"") 31G X 8 MM Misc Use with insulin 4 times daily 400 each 2    tobramycin-dexamethasone (TOBRADEX) ophthalmic suspension POST-OP: 1 drop in surgery eye QID. Continue as directed. prednisoLONE acetate (PRED FORTE, OMNIPRED) 1 % ophthalmic suspension Apply 1 drop to eye. RELION INSULIN SYRINGE 31G X 5/16\"" 1 ML Misc USE TWICE DAILY WITH INSULIN 100 each 8    triamcinolone acetonide (KENALOG-40) 40 MG/ML injectable suspension 40 mg by Intravitreal route. - Intravitreal      timolol (TIMOPTIC) 0.5 % ophthalmic solution 1 Drop.       " dorzolamide-timolol (COSOPT) 22.3-6.8 MG/ML ophthalmic solution       dorzolamide (TRUSOPT) 2 % ophthalmic solution 1 drop. No current facility-administered medications for this visit. ALLERGIES:  No Known Allergies  Social History     Tobacco Use   Smoking Status Former    Current packs/day: 0.00    Types: Cigarettes    Quit date: 3/16/2010    Years since quittin.7   Smokeless Tobacco Never       OBJECTIVE     Constitutional:  Patient alert and oriented x 3   Good body habitus and hygiene     LEFT foot:  Nails: Yellow discoloration with thickness and dystrophy of the remaining nails, pain to palpation of toenails. There is minor periungal redness around base of the affected toenails with periungual debris and brittle texture.    Dorsally contracted digits Left 2nd, 4th the flexion deformity at PIPJ and prominent dorsal joint with pain  +Soft tissue sub-cutaneous mass noted on the Left anterior ankle measuring 15 x 11 mm, semi-motile, NO pain to palpation, + transilluminating  Lesions: hyperkeratotic tissue noted at Left hallux amputation     WOUND  Location: Left heel - proximal  Date: Grade Measurements Base Border Drainage Other    23 2 19 x 12 mm probes 10 mm fibrogranular  hyperkeratotic Sero-sanguinous    10/6/23 2 12 x 5 mm in diameter undermines 27 mm fibrogranular hyperkeratotic Sero-sanguinous      WOUND  Location: Left heel - distal  Date: Grade Measurements Base Border Drainage Other    10/6/23 2 5 x 9 mm undermines 40 mm fibrogranular  hyperkeratotic Sero-sanguinous Both wounds communicate   23 2 4x8, probes 16mm fibrogranular hyperkeratotic Sero-sanguinous       WOUND  Location: Left heel (2 wounds communicating with eachother  Date: Grade Measurements Base Border Drainage Other    10/20/23 2 6x7 mm , undermines 40 mm fibrogranular  hyperkeratotic Sero-sanguinous communicating with other wound   23 2 6 x 7 mm , undermines 40 mm fibrogranular hyperkeratotic Sero-sanguinous 11/16/23 2 6x6, probes 15mm fibrogranular hyperkeratotic Sero-sanguinous    12/6/23 2 10 x 4 mm  Probes 2 mm fibrogranular hyperkeratotic Sero-sanguinous No exposed bone            Document Information    Photographic Image: Photo/Graph/Diagram   Left heel wound   12/06/2023 10:24   Attached To: Office Visit on 12/6/23 with Rudy West DPM   Source Information    Adolfo Bonilla DPM  [address]       Positive edema and erythema extending up to the lateral ankle joint. RIGHT foot:  Bandage: None  Vascular status to the foot intact with capillary refill to the toes of < 3 seconds. Neuro: Intact to light touch with numbness noted maile-incisionally  There is no  warmth or drainage. Erythema: None  Edema: None  Ecchymosis: None  Incision intact. No dehiscence or drainage. No bleeding. Hyperkeratotic 5th metatarsal head. No SOI of operative site. MSK: patient able to wiggle toes and plantarflex and dorsiflex the toes actively. Dry and stable. ASSESSMENT (Global: 9/26/23 - 10/6/23 for left heel I&D   Stable post-op, no infection  S/p heel incision and drainage L heel (9/26/23)  S/p Partial amputation R hallux IPJ level  S/p Partial amputation R 2nd ray (Global: 8/18/16 to 11/18/16)  S/p Partial amputation L hallux (Global: 11/3/15 to 2/3/16)   S/p Partial amputation L 3rd toe (Global: 9/24/14 to 12/24/14)  Ganglion cyst, Left anterior ankle  Hammertoes, Left 2nd, 4th  Callus formations, Left hallux amputation   Fungal nails, L ,2,4,5, R 3-5  Diabetes with neurologic changes/complications  Grade 2 wound, Proximal Left heel  Grade 2 wound, Distal Left heel  Abscess cellulitis, Left heel  No diagnosis found. PLAN    The patient was clinically examined. His most recent A1c is 9.8% on 10/20/23. He is post-op left heel incision and drainage with deep communicating wounds on lateral aspect of the heel.   On exam, he presents with 2 connected/communicating Left heel Grade 2 wounds which were debrided today in clinic. He presents today PWB with sandals and a single crutch. Discontinue WoundVac and begin dressing with medihoney. Dispense MediHoney. He is at risk for limb loss. Continue ambulating with crutches. Continue with daily dressing changes with minimum iodine. Keep Left foot dry. Instructed to go the hospital if symptoms worsen. Contact with questions or concerns. Patient education: We discussed A1C < 7%, LDL < 100mg/dl, no smoking and 30 minutes of walking daily to reduce the risk of diabetes related lower extremity amputation. We discussed appropriate foot care and shoes and dispensed written patient education material (see AVS) DM and DM foot care. Patient advised to check his feet on a daily basis and if he/she notices any wound or ulcer on his feet/legs that fails to self heal, to please advise their PCP or our service as soon as possible. Any signs of infection are to be reported immediately or go to ER or WIC. Procedures: Left heel Grade 2 wound was debrided using #15 blade and tissue nippers full thickness skin through subcutaneous tissue removing all hyperkeratotic, fibrotic, and necrotic tissues leaving a clean granular base. Wound was dressed with wet to dry dressings. Patient to keep wound clean, dry and covered at all times and avoid immersing wound directly in water. Appropriate wound care instructions given to patient. Patient advised that if the foot/heel worsens, he is to go to the ER. RX: Dispense MediHoney  RX: Discontinue WoundVac and begin dressings with medihoney. RX: Continue with wound care nurse. RX: Continue ambulating with crutches. RX: Keep Left foot dry. RX: Instructed to go the hospital if symptoms worsen.      FOLLOW UP  3 weeks     Scribe: Electronically signed: Rocio Foster has scribed for Dr. Jag Syed, 12/06/23  I have reviewed and edited the progress note and agree with what has been scribed. Electronically signed by: Afshan Glass DPM  12/6/2023          Note to patient:  The [address] [place] [address] makes medical notes like these available to patients in the interest of transparency. However, be advised this is a medical document. It is intended as peer to peer communication. It is written in medical language and may contain abbreviations or verbiage that are unfamiliar. It may appear blunt or direct. Medical documents are intended to carry relevant information, facts as evident, and the clinical opinion of the practioner.  Rickie Calvillo DPM

## 2024-08-04 LAB
ANION GAP SERPL CALCULATED.3IONS-SCNC: 11 MMOL/L (ref 7–16)
BASOPHILS # BLD: 0.01 K/UL (ref 0–0.2)
BASOPHILS NFR BLD: 0 % (ref 0–2)
BUN SERPL-MCNC: 27 MG/DL (ref 6–20)
CALCIUM SERPL-MCNC: 9.3 MG/DL (ref 8.6–10.2)
CHLORIDE SERPL-SCNC: 95 MMOL/L (ref 98–107)
CO2 SERPL-SCNC: 29 MMOL/L (ref 22–29)
CREAT SERPL-MCNC: 4.7 MG/DL (ref 0.5–1)
EOSINOPHIL # BLD: 0.12 K/UL (ref 0.05–0.5)
EOSINOPHILS RELATIVE PERCENT: 3 % (ref 0–6)
ERYTHROCYTE [DISTWIDTH] IN BLOOD BY AUTOMATED COUNT: 17.1 % (ref 11.5–15)
GFR, ESTIMATED: 11 ML/MIN/1.73M2
GLUCOSE BLD-MCNC: 135 MG/DL (ref 74–99)
GLUCOSE BLD-MCNC: 273 MG/DL (ref 74–99)
GLUCOSE BLD-MCNC: 473 MG/DL (ref 74–99)
GLUCOSE BLD-MCNC: 81 MG/DL (ref 74–99)
GLUCOSE BLD-MCNC: <40 MG/DL (ref 74–99)
GLUCOSE BLD-MCNC: >500 MG/DL (ref 74–99)
GLUCOSE SERPL-MCNC: 101 MG/DL (ref 74–99)
HCT VFR BLD AUTO: 24.6 % (ref 34–48)
HGB BLD-MCNC: 8 G/DL (ref 11.5–15.5)
IMM GRANULOCYTES # BLD AUTO: <0.03 K/UL (ref 0–0.58)
IMM GRANULOCYTES NFR BLD: 0 % (ref 0–5)
LYMPHOCYTES NFR BLD: 0.63 K/UL (ref 1.5–4)
LYMPHOCYTES RELATIVE PERCENT: 16 % (ref 20–42)
MCH RBC QN AUTO: 28.9 PG (ref 26–35)
MCHC RBC AUTO-ENTMCNC: 32.5 G/DL (ref 32–34.5)
MCV RBC AUTO: 88.8 FL (ref 80–99.9)
MONOCYTES NFR BLD: 0.4 K/UL (ref 0.1–0.95)
MONOCYTES NFR BLD: 10 % (ref 2–12)
NEUTROPHILS NFR BLD: 71 % (ref 43–80)
NEUTS SEG NFR BLD: 2.85 K/UL (ref 1.8–7.3)
PLATELET # BLD AUTO: 201 K/UL (ref 130–450)
PMV BLD AUTO: 10.4 FL (ref 7–12)
POTASSIUM SERPL-SCNC: 3.3 MMOL/L (ref 3.5–5)
RBC # BLD AUTO: 2.77 M/UL (ref 3.5–5.5)
SODIUM SERPL-SCNC: 135 MMOL/L (ref 132–146)
WBC OTHER # BLD: 4 K/UL (ref 4.5–11.5)

## 2024-08-04 PROCEDURE — 6370000000 HC RX 637 (ALT 250 FOR IP): Performed by: SURGERY

## 2024-08-04 PROCEDURE — 6370000000 HC RX 637 (ALT 250 FOR IP): Performed by: HOSPITALIST

## 2024-08-04 PROCEDURE — 87081 CULTURE SCREEN ONLY: CPT

## 2024-08-04 PROCEDURE — 2700000000 HC OXYGEN THERAPY PER DAY

## 2024-08-04 PROCEDURE — 80048 BASIC METABOLIC PNL TOTAL CA: CPT

## 2024-08-04 PROCEDURE — 99233 SBSQ HOSP IP/OBS HIGH 50: CPT | Performed by: INTERNAL MEDICINE

## 2024-08-04 PROCEDURE — 2500000003 HC RX 250 WO HCPCS: Performed by: HOSPITALIST

## 2024-08-04 PROCEDURE — 36415 COLL VENOUS BLD VENIPUNCTURE: CPT

## 2024-08-04 PROCEDURE — 6370000000 HC RX 637 (ALT 250 FOR IP)

## 2024-08-04 PROCEDURE — 85025 COMPLETE CBC W/AUTO DIFF WBC: CPT

## 2024-08-04 PROCEDURE — 2580000003 HC RX 258: Performed by: HOSPITALIST

## 2024-08-04 PROCEDURE — 6370000000 HC RX 637 (ALT 250 FOR IP): Performed by: STUDENT IN AN ORGANIZED HEALTH CARE EDUCATION/TRAINING PROGRAM

## 2024-08-04 PROCEDURE — 5A1D70Z PERFORMANCE OF URINARY FILTRATION, INTERMITTENT, LESS THAN 6 HOURS PER DAY: ICD-10-PCS | Performed by: INTERNAL MEDICINE

## 2024-08-04 PROCEDURE — 94660 CPAP INITIATION&MGMT: CPT

## 2024-08-04 PROCEDURE — 82962 GLUCOSE BLOOD TEST: CPT

## 2024-08-04 PROCEDURE — 2060000000 HC ICU INTERMEDIATE R&B

## 2024-08-04 PROCEDURE — 99232 SBSQ HOSP IP/OBS MODERATE 35: CPT | Performed by: STUDENT IN AN ORGANIZED HEALTH CARE EDUCATION/TRAINING PROGRAM

## 2024-08-04 PROCEDURE — 99232 SBSQ HOSP IP/OBS MODERATE 35: CPT | Performed by: SURGERY

## 2024-08-04 RX ORDER — POTASSIUM CHLORIDE 20 MEQ/1
40 TABLET, EXTENDED RELEASE ORAL PRN
Status: DISCONTINUED | OUTPATIENT
Start: 2024-08-04 | End: 2024-08-07 | Stop reason: HOSPADM

## 2024-08-04 RX ORDER — IPRATROPIUM BROMIDE AND ALBUTEROL SULFATE 2.5; .5 MG/3ML; MG/3ML
1 SOLUTION RESPIRATORY (INHALATION) EVERY 4 HOURS PRN
Status: DISCONTINUED | OUTPATIENT
Start: 2024-08-04 | End: 2024-08-07 | Stop reason: HOSPADM

## 2024-08-04 RX ORDER — INSULIN LISPRO 100 [IU]/ML
10 INJECTION, SOLUTION INTRAVENOUS; SUBCUTANEOUS ONCE
Status: COMPLETED | OUTPATIENT
Start: 2024-08-04 | End: 2024-08-04

## 2024-08-04 RX ORDER — POTASSIUM CHLORIDE 20 MEQ/1
40 TABLET, EXTENDED RELEASE ORAL ONCE
Status: COMPLETED | OUTPATIENT
Start: 2024-08-04 | End: 2024-08-04

## 2024-08-04 RX ORDER — INSULIN GLARGINE 100 [IU]/ML
20 INJECTION, SOLUTION SUBCUTANEOUS DAILY
Status: DISCONTINUED | OUTPATIENT
Start: 2024-08-04 | End: 2024-08-07 | Stop reason: HOSPADM

## 2024-08-04 RX ORDER — PANTOPRAZOLE SODIUM 40 MG/1
40 TABLET, DELAYED RELEASE ORAL
Status: DISCONTINUED | OUTPATIENT
Start: 2024-08-05 | End: 2024-08-05

## 2024-08-04 RX ORDER — INSULIN LISPRO 100 [IU]/ML
10 INJECTION, SOLUTION INTRAVENOUS; SUBCUTANEOUS
Status: DISCONTINUED | OUTPATIENT
Start: 2024-08-04 | End: 2024-08-07 | Stop reason: HOSPADM

## 2024-08-04 RX ORDER — HYDROCORTISONE 25 MG/G
CREAM TOPICAL 2 TIMES DAILY
Status: DISCONTINUED | OUTPATIENT
Start: 2024-08-04 | End: 2024-08-07 | Stop reason: HOSPADM

## 2024-08-04 RX ADMIN — SODIUM CHLORIDE, PRESERVATIVE FREE 10 ML: 5 INJECTION INTRAVENOUS at 09:00

## 2024-08-04 RX ADMIN — INSULIN GLARGINE 20 UNITS: 100 INJECTION, SOLUTION SUBCUTANEOUS at 08:59

## 2024-08-04 RX ADMIN — OXYCODONE 5 MG: 5 TABLET ORAL at 01:09

## 2024-08-04 RX ADMIN — INSULIN LISPRO 10 UNITS: 100 INJECTION, SOLUTION INTRAVENOUS; SUBCUTANEOUS at 12:55

## 2024-08-04 RX ADMIN — OXYCODONE 5 MG: 5 TABLET ORAL at 18:45

## 2024-08-04 RX ADMIN — INSULIN LISPRO 8 UNITS: 100 INJECTION, SOLUTION INTRAVENOUS; SUBCUTANEOUS at 08:59

## 2024-08-04 RX ADMIN — CYANOCOBALAMIN TAB 1000 MCG 500 MCG: 1000 TAB at 09:00

## 2024-08-04 RX ADMIN — OXYCODONE 5 MG: 5 TABLET ORAL at 05:42

## 2024-08-04 RX ADMIN — HYDROCORTISONE: 25 CREAM TOPICAL at 16:22

## 2024-08-04 RX ADMIN — INSULIN LISPRO 10 UNITS: 100 INJECTION, SOLUTION INTRAVENOUS; SUBCUTANEOUS at 08:58

## 2024-08-04 RX ADMIN — CARVEDILOL 25 MG: 25 TABLET, FILM COATED ORAL at 16:46

## 2024-08-04 RX ADMIN — SALINE NASAL SPRAY 1 SPRAY: 1.5 SOLUTION NASAL at 16:23

## 2024-08-04 RX ADMIN — PANTOPRAZOLE SODIUM 40 MG: 40 TABLET, DELAYED RELEASE ORAL at 05:42

## 2024-08-04 RX ADMIN — CALCIUM ACETATE 2668 MG: 667 CAPSULE ORAL at 16:45

## 2024-08-04 RX ADMIN — INSULIN LISPRO 2 UNITS: 100 INJECTION, SOLUTION INTRAVENOUS; SUBCUTANEOUS at 12:54

## 2024-08-04 RX ADMIN — CALCIUM ACETATE 2668 MG: 667 CAPSULE ORAL at 09:00

## 2024-08-04 RX ADMIN — POTASSIUM CHLORIDE 40 MEQ: 1500 TABLET, EXTENDED RELEASE ORAL at 16:46

## 2024-08-04 RX ADMIN — CALCIUM ACETATE 2668 MG: 667 CAPSULE ORAL at 12:54

## 2024-08-04 RX ADMIN — DEXTROSE MONOHYDRATE 250 ML: 100 INJECTION, SOLUTION INTRAVENOUS at 16:21

## 2024-08-04 RX ADMIN — INSULIN LISPRO 4 UNITS: 100 INJECTION, SOLUTION INTRAVENOUS; SUBCUTANEOUS at 08:58

## 2024-08-04 ASSESSMENT — PAIN SCALES - GENERAL: PAINLEVEL_OUTOF10: 6

## 2024-08-04 ASSESSMENT — PAIN DESCRIPTION - LOCATION: LOCATION: FACE

## 2024-08-04 ASSESSMENT — PAIN DESCRIPTION - ORIENTATION: ORIENTATION: RIGHT

## 2024-08-05 LAB
ANION GAP SERPL CALCULATED.3IONS-SCNC: 12 MMOL/L (ref 7–16)
BASOPHILS # BLD: 0.01 K/UL (ref 0–0.2)
BASOPHILS NFR BLD: 0 % (ref 0–2)
BUN SERPL-MCNC: 36 MG/DL (ref 6–20)
CALCIUM SERPL-MCNC: 9.6 MG/DL (ref 8.6–10.2)
CHLORIDE SERPL-SCNC: 94 MMOL/L (ref 98–107)
CO2 SERPL-SCNC: 27 MMOL/L (ref 22–29)
CREAT SERPL-MCNC: 5.9 MG/DL (ref 0.5–1)
EOSINOPHIL # BLD: 0.26 K/UL (ref 0.05–0.5)
EOSINOPHILS RELATIVE PERCENT: 6 % (ref 0–6)
ERYTHROCYTE [DISTWIDTH] IN BLOOD BY AUTOMATED COUNT: 16.3 % (ref 11.5–15)
GFR, ESTIMATED: 9 ML/MIN/1.73M2
GLUCOSE BLD-MCNC: 168 MG/DL (ref 74–99)
GLUCOSE BLD-MCNC: 174 MG/DL (ref 74–99)
GLUCOSE BLD-MCNC: 252 MG/DL (ref 74–99)
GLUCOSE BLD-MCNC: 274 MG/DL (ref 74–99)
GLUCOSE BLD-MCNC: 312 MG/DL (ref 74–99)
GLUCOSE BLD-MCNC: 72 MG/DL (ref 74–99)
GLUCOSE SERPL-MCNC: 224 MG/DL (ref 74–99)
HCT VFR BLD AUTO: 22.1 % (ref 34–48)
HEMOCCULT STL QL: NORMAL
HGB BLD-MCNC: 7.2 G/DL (ref 11.5–15.5)
IMM GRANULOCYTES # BLD AUTO: <0.03 K/UL (ref 0–0.58)
IMM GRANULOCYTES NFR BLD: 0 % (ref 0–5)
LYMPHOCYTES NFR BLD: 0.94 K/UL (ref 1.5–4)
LYMPHOCYTES RELATIVE PERCENT: 20 % (ref 20–42)
MAGNESIUM SERPL-MCNC: 2.1 MG/DL (ref 1.6–2.6)
MCH RBC QN AUTO: 28.6 PG (ref 26–35)
MCHC RBC AUTO-ENTMCNC: 32.6 G/DL (ref 32–34.5)
MCV RBC AUTO: 87.7 FL (ref 80–99.9)
MICROORGANISM SPEC CULT: ABNORMAL
MICROORGANISM SPEC CULT: NORMAL
MICROORGANISM/AGENT SPEC: ABNORMAL
MONOCYTES NFR BLD: 0.49 K/UL (ref 0.1–0.95)
MONOCYTES NFR BLD: 10 % (ref 2–12)
NEUTROPHILS NFR BLD: 64 % (ref 43–80)
NEUTS SEG NFR BLD: 3.03 K/UL (ref 1.8–7.3)
PHOSPHATE SERPL-MCNC: 3.2 MG/DL (ref 2.5–4.5)
PLATELET # BLD AUTO: 213 K/UL (ref 130–450)
PMV BLD AUTO: 10 FL (ref 7–12)
POTASSIUM SERPL-SCNC: 4.5 MMOL/L (ref 3.5–5)
RBC # BLD AUTO: 2.52 M/UL (ref 3.5–5.5)
SERVICE CMNT-IMP: ABNORMAL
SERVICE CMNT-IMP: NORMAL
SODIUM SERPL-SCNC: 133 MMOL/L (ref 132–146)
SPECIMEN DESCRIPTION: ABNORMAL
SPECIMEN DESCRIPTION: NORMAL
WBC OTHER # BLD: 4.7 K/UL (ref 4.5–11.5)

## 2024-08-05 PROCEDURE — 97165 OT EVAL LOW COMPLEX 30 MIN: CPT

## 2024-08-05 PROCEDURE — 2700000000 HC OXYGEN THERAPY PER DAY

## 2024-08-05 PROCEDURE — 83735 ASSAY OF MAGNESIUM: CPT

## 2024-08-05 PROCEDURE — 6370000000 HC RX 637 (ALT 250 FOR IP): Performed by: HOSPITALIST

## 2024-08-05 PROCEDURE — 97530 THERAPEUTIC ACTIVITIES: CPT

## 2024-08-05 PROCEDURE — 90945 DIALYSIS ONE EVALUATION: CPT

## 2024-08-05 PROCEDURE — 82962 GLUCOSE BLOOD TEST: CPT

## 2024-08-05 PROCEDURE — 2060000000 HC ICU INTERMEDIATE R&B

## 2024-08-05 PROCEDURE — 2580000003 HC RX 258: Performed by: HOSPITALIST

## 2024-08-05 PROCEDURE — 6370000000 HC RX 637 (ALT 250 FOR IP)

## 2024-08-05 PROCEDURE — 3E1M39Z IRRIGATION OF PERITONEAL CAVITY USING DIALYSATE, PERCUTANEOUS APPROACH: ICD-10-PCS | Performed by: STUDENT IN AN ORGANIZED HEALTH CARE EDUCATION/TRAINING PROGRAM

## 2024-08-05 PROCEDURE — 6370000000 HC RX 637 (ALT 250 FOR IP): Performed by: STUDENT IN AN ORGANIZED HEALTH CARE EDUCATION/TRAINING PROGRAM

## 2024-08-05 PROCEDURE — 80048 BASIC METABOLIC PNL TOTAL CA: CPT

## 2024-08-05 PROCEDURE — 2500000003 HC RX 250 WO HCPCS: Performed by: HOSPITALIST

## 2024-08-05 PROCEDURE — 85025 COMPLETE CBC W/AUTO DIFF WBC: CPT

## 2024-08-05 PROCEDURE — 97535 SELF CARE MNGMENT TRAINING: CPT

## 2024-08-05 PROCEDURE — 36415 COLL VENOUS BLD VENIPUNCTURE: CPT

## 2024-08-05 PROCEDURE — 97161 PT EVAL LOW COMPLEX 20 MIN: CPT

## 2024-08-05 PROCEDURE — 99232 SBSQ HOSP IP/OBS MODERATE 35: CPT | Performed by: INTERNAL MEDICINE

## 2024-08-05 PROCEDURE — 84100 ASSAY OF PHOSPHORUS: CPT

## 2024-08-05 PROCEDURE — 99232 SBSQ HOSP IP/OBS MODERATE 35: CPT | Performed by: STUDENT IN AN ORGANIZED HEALTH CARE EDUCATION/TRAINING PROGRAM

## 2024-08-05 RX ORDER — PANTOPRAZOLE SODIUM 40 MG/1
40 TABLET, DELAYED RELEASE ORAL
Qty: 30 TABLET | Refills: 3 | Status: SHIPPED | OUTPATIENT
Start: 2024-08-05 | End: 2024-08-07

## 2024-08-05 RX ORDER — PANTOPRAZOLE SODIUM 40 MG/1
40 TABLET, DELAYED RELEASE ORAL
Status: DISCONTINUED | OUTPATIENT
Start: 2024-08-05 | End: 2024-08-07 | Stop reason: HOSPADM

## 2024-08-05 RX ORDER — POLYETHYLENE GLYCOL 3350 17 G/17G
17 POWDER, FOR SOLUTION ORAL DAILY
Status: DISCONTINUED | OUTPATIENT
Start: 2024-08-05 | End: 2024-08-07 | Stop reason: HOSPADM

## 2024-08-05 RX ORDER — DOXYCYCLINE HYCLATE 100 MG/1
100 CAPSULE ORAL EVERY 12 HOURS SCHEDULED
Status: DISCONTINUED | OUTPATIENT
Start: 2024-08-05 | End: 2024-08-07 | Stop reason: HOSPADM

## 2024-08-05 RX ORDER — DOXYCYCLINE HYCLATE 100 MG/1
100 CAPSULE ORAL EVERY 12 HOURS SCHEDULED
Qty: 14 CAPSULE | Refills: 0 | Status: SHIPPED | OUTPATIENT
Start: 2024-08-05 | End: 2024-08-07

## 2024-08-05 RX ORDER — SENNA AND DOCUSATE SODIUM 50; 8.6 MG/1; MG/1
2 TABLET, FILM COATED ORAL DAILY
Qty: 60 TABLET | Refills: 0 | Status: SHIPPED | OUTPATIENT
Start: 2024-08-06 | End: 2024-08-07

## 2024-08-05 RX ORDER — HYDROCORTISONE 25 MG/G
1 CREAM TOPICAL 2 TIMES DAILY
Qty: 28 G | Refills: 2 | Status: SHIPPED | OUTPATIENT
Start: 2024-08-05 | End: 2024-08-07

## 2024-08-05 RX ORDER — OXYCODONE HYDROCHLORIDE 5 MG/1
5 TABLET ORAL EVERY 4 HOURS PRN
Qty: 10 TABLET | Refills: 0 | Status: SHIPPED | OUTPATIENT
Start: 2024-08-05 | End: 2024-08-07

## 2024-08-05 RX ORDER — SENNA AND DOCUSATE SODIUM 50; 8.6 MG/1; MG/1
2 TABLET, FILM COATED ORAL DAILY
Status: DISCONTINUED | OUTPATIENT
Start: 2024-08-05 | End: 2024-08-07 | Stop reason: HOSPADM

## 2024-08-05 RX ORDER — POLYETHYLENE GLYCOL 3350 17 G/17G
17 POWDER, FOR SOLUTION ORAL DAILY
Qty: 527 G | Refills: 1 | Status: SHIPPED | OUTPATIENT
Start: 2024-08-06 | End: 2024-08-07

## 2024-08-05 RX ADMIN — CARVEDILOL 25 MG: 25 TABLET, FILM COATED ORAL at 17:39

## 2024-08-05 RX ADMIN — HYDROCORTISONE: 25 CREAM TOPICAL at 06:53

## 2024-08-05 RX ADMIN — PANTOPRAZOLE SODIUM 40 MG: 40 TABLET, DELAYED RELEASE ORAL at 17:39

## 2024-08-05 RX ADMIN — PANTOPRAZOLE SODIUM 40 MG: 40 TABLET, DELAYED RELEASE ORAL at 06:42

## 2024-08-05 RX ADMIN — CALCIUM ACETATE 2668 MG: 667 CAPSULE ORAL at 08:54

## 2024-08-05 RX ADMIN — SENNOSIDES AND DOCUSATE SODIUM 2 TABLET: 50; 8.6 TABLET ORAL at 08:53

## 2024-08-05 RX ADMIN — CALCIUM ACETATE 2668 MG: 667 CAPSULE ORAL at 17:39

## 2024-08-05 RX ADMIN — CYANOCOBALAMIN TAB 1000 MCG 500 MCG: 1000 TAB at 08:54

## 2024-08-05 RX ADMIN — INSULIN GLARGINE 20 UNITS: 100 INJECTION, SOLUTION SUBCUTANEOUS at 08:54

## 2024-08-05 RX ADMIN — INSULIN LISPRO 10 UNITS: 100 INJECTION, SOLUTION INTRAVENOUS; SUBCUTANEOUS at 12:55

## 2024-08-05 RX ADMIN — INSULIN LISPRO 2 UNITS: 100 INJECTION, SOLUTION INTRAVENOUS; SUBCUTANEOUS at 09:17

## 2024-08-05 RX ADMIN — CALCIUM ACETATE 2668 MG: 667 CAPSULE ORAL at 12:55

## 2024-08-05 RX ADMIN — DOXYCYCLINE HYCLATE 100 MG: 100 CAPSULE ORAL at 20:06

## 2024-08-05 RX ADMIN — POLYETHYLENE GLYCOL 3350 17 G: 17 POWDER, FOR SOLUTION ORAL at 08:53

## 2024-08-05 RX ADMIN — HYDROCORTISONE: 25 CREAM TOPICAL at 09:03

## 2024-08-05 RX ADMIN — OXYCODONE 5 MG: 5 TABLET ORAL at 12:59

## 2024-08-05 RX ADMIN — INSULIN LISPRO 4 UNITS: 100 INJECTION, SOLUTION INTRAVENOUS; SUBCUTANEOUS at 20:09

## 2024-08-05 RX ADMIN — OXYCODONE 5 MG: 5 TABLET ORAL at 17:48

## 2024-08-05 RX ADMIN — SODIUM CHLORIDE, PRESERVATIVE FREE 10 ML: 5 INJECTION INTRAVENOUS at 20:06

## 2024-08-05 RX ADMIN — INSULIN LISPRO 10 UNITS: 100 INJECTION, SOLUTION INTRAVENOUS; SUBCUTANEOUS at 08:54

## 2024-08-05 RX ADMIN — HYDROCORTISONE: 25 CREAM TOPICAL at 20:10

## 2024-08-05 ASSESSMENT — PAIN DESCRIPTION - LOCATION
LOCATION: FACE
LOCATION: FACE

## 2024-08-05 ASSESSMENT — PAIN DESCRIPTION - ORIENTATION
ORIENTATION: RIGHT
ORIENTATION: RIGHT

## 2024-08-05 ASSESSMENT — PAIN SCALES - GENERAL
PAINLEVEL_OUTOF10: 7
PAINLEVEL_OUTOF10: 8
PAINLEVEL_OUTOF10: 8
PAINLEVEL_OUTOF10: 4
PAINLEVEL_OUTOF10: 8
PAINLEVEL_OUTOF10: 5
PAINLEVEL_OUTOF10: 6

## 2024-08-05 ASSESSMENT — PAIN - FUNCTIONAL ASSESSMENT
PAIN_FUNCTIONAL_ASSESSMENT: ACTIVITIES ARE NOT PREVENTED
PAIN_FUNCTIONAL_ASSESSMENT: ACTIVITIES ARE NOT PREVENTED

## 2024-08-05 ASSESSMENT — PAIN SCALES - WONG BAKER
WONGBAKER_NUMERICALRESPONSE: HURTS A LITTLE BIT

## 2024-08-05 ASSESSMENT — PAIN DESCRIPTION - DESCRIPTORS
DESCRIPTORS: ACHING
DESCRIPTORS: ACHING

## 2024-08-05 NOTE — PLAN OF CARE
Problem: Discharge Planning  Goal: Discharge to home or other facility with appropriate resources  8/5/2024 0055 by Billie Ochoa RN  Outcome: Progressing  8/4/2024 1113 by Maty Hair RN  Outcome: Progressing     Problem: ABCDS Injury Assessment  Goal: Absence of physical injury  8/5/2024 0055 by Billie Ochoa RN  Outcome: Progressing  8/4/2024 1113 by Maty Hair RN  Outcome: Progressing     Problem: Pain  Goal: Verbalizes/displays adequate comfort level or baseline comfort level  8/5/2024 0055 by Billie Ochoa RN  Outcome: Progressing  8/4/2024 1113 by Maty Hair RN  Outcome: Progressing     Problem: Safety - Adult  Goal: Free from fall injury  8/5/2024 0055 by Billie Ochoa RN  Outcome: Progressing  8/4/2024 1113 by Maty Hair RN  Outcome: Progressing

## 2024-08-05 NOTE — FLOWSHEET NOTE
08/05/24 1720   Vital Signs   BP (!) 107/56   Temp 96.9 °F (36.1 °C)   Pulse 91   Respirations 18   Weight - Scale 68.7 kg (151 lb 7.3 oz)   Percent Weight Change 7.68   Post-Hemodialysis Assessment   Post-Treatment Procedures Blood returned;Access bleeding time < 10 minutes   Machine Disinfection Process Exterior Machine Disinfection;Heat Disinfect;Acid/Vinegar Clean   Rinseback Volume (ml) 300 ml   Blood Volume Processed (Liters) 0 L   Dialyzer Clearance Lightly streaked   Duration of Treatment (minutes) 120 minutes   Heparin Amount Administered During Treatment (mL) 0 mL   Hemodialysis Intake (ml) 300 ml   Hemodialysis Output (ml) 3000 ml   NET Removed (ml) 2700   Tolerated Treatment Good   Patient Response to Treatment tolerated well   Bilateral Breath Sounds Diminished   Edema None   Facial Edema +3   Physician Notified No   Time Off 1541   Patient Disposition Return to room   Observations & Evaluations   Level of Consciousness 0   Oriented X 3

## 2024-08-05 NOTE — PLAN OF CARE
Problem: Discharge Planning  Goal: Discharge to home or other facility with appropriate resources  8/5/2024 1118 by Valerie Stearns RN  Outcome: Progressing  8/5/2024 0055 by Billie Ochoa RN  Outcome: Progressing     Problem: ABCDS Injury Assessment  Goal: Absence of physical injury  8/5/2024 1118 by Valerie Stearns RN  Outcome: Progressing  8/5/2024 0055 by Billie Ochoa RN  Outcome: Progressing     Problem: Pain  Goal: Verbalizes/displays adequate comfort level or baseline comfort level  8/5/2024 0055 by Billie Ochoa RN  Outcome: Progressing     Problem: Safety - Adult  Goal: Free from fall injury  8/5/2024 0055 by Billie Ochoa RN  Outcome: Progressing

## 2024-08-06 LAB
ANION GAP SERPL CALCULATED.3IONS-SCNC: 12 MMOL/L (ref 7–16)
BASOPHILS # BLD: 0.02 K/UL (ref 0–0.2)
BASOPHILS NFR BLD: 0 % (ref 0–2)
BUN SERPL-MCNC: 53 MG/DL (ref 6–20)
CALCIUM SERPL-MCNC: 9.9 MG/DL (ref 8.6–10.2)
CHLORIDE SERPL-SCNC: 94 MMOL/L (ref 98–107)
CO2 SERPL-SCNC: 26 MMOL/L (ref 22–29)
CREAT SERPL-MCNC: 8 MG/DL (ref 0.5–1)
EOSINOPHIL # BLD: 0.31 K/UL (ref 0.05–0.5)
EOSINOPHILS RELATIVE PERCENT: 6 % (ref 0–6)
ERYTHROCYTE [DISTWIDTH] IN BLOOD BY AUTOMATED COUNT: 16 % (ref 11.5–15)
GFR, ESTIMATED: 6 ML/MIN/1.73M2
GLUCOSE BLD-MCNC: 178 MG/DL (ref 74–99)
GLUCOSE BLD-MCNC: 189 MG/DL (ref 74–99)
GLUCOSE BLD-MCNC: 251 MG/DL (ref 74–99)
GLUCOSE BLD-MCNC: 79 MG/DL (ref 74–99)
GLUCOSE SERPL-MCNC: 196 MG/DL (ref 74–99)
HCT VFR BLD AUTO: 21.8 % (ref 34–48)
HGB BLD-MCNC: 7.2 G/DL (ref 11.5–15.5)
IMM GRANULOCYTES # BLD AUTO: 0.03 K/UL (ref 0–0.58)
IMM GRANULOCYTES NFR BLD: 1 % (ref 0–5)
LYMPHOCYTES NFR BLD: 1.25 K/UL (ref 1.5–4)
LYMPHOCYTES RELATIVE PERCENT: 22 % (ref 20–42)
MCH RBC QN AUTO: 29.3 PG (ref 26–35)
MCHC RBC AUTO-ENTMCNC: 33 G/DL (ref 32–34.5)
MCV RBC AUTO: 88.6 FL (ref 80–99.9)
MICROORGANISM SPEC CULT: ABNORMAL
MONOCYTES NFR BLD: 0.55 K/UL (ref 0.1–0.95)
MONOCYTES NFR BLD: 10 % (ref 2–12)
NEUTROPHILS NFR BLD: 62 % (ref 43–80)
NEUTS SEG NFR BLD: 3.49 K/UL (ref 1.8–7.3)
PHOSPHATE SERPL-MCNC: 3.8 MG/DL (ref 2.5–4.5)
PLATELET # BLD AUTO: 229 K/UL (ref 130–450)
PMV BLD AUTO: 10.2 FL (ref 7–12)
POTASSIUM SERPL-SCNC: 5 MMOL/L (ref 3.5–5)
RBC # BLD AUTO: 2.46 M/UL (ref 3.5–5.5)
SERVICE CMNT-IMP: ABNORMAL
SODIUM SERPL-SCNC: 132 MMOL/L (ref 132–146)
SPECIMEN DESCRIPTION: ABNORMAL
WBC OTHER # BLD: 5.7 K/UL (ref 4.5–11.5)

## 2024-08-06 PROCEDURE — 84100 ASSAY OF PHOSPHORUS: CPT

## 2024-08-06 PROCEDURE — 85025 COMPLETE CBC W/AUTO DIFF WBC: CPT

## 2024-08-06 PROCEDURE — 99232 SBSQ HOSP IP/OBS MODERATE 35: CPT | Performed by: INTERNAL MEDICINE

## 2024-08-06 PROCEDURE — 2060000000 HC ICU INTERMEDIATE R&B

## 2024-08-06 PROCEDURE — 6370000000 HC RX 637 (ALT 250 FOR IP): Performed by: STUDENT IN AN ORGANIZED HEALTH CARE EDUCATION/TRAINING PROGRAM

## 2024-08-06 PROCEDURE — 6370000000 HC RX 637 (ALT 250 FOR IP): Performed by: HOSPITALIST

## 2024-08-06 PROCEDURE — 83735 ASSAY OF MAGNESIUM: CPT

## 2024-08-06 PROCEDURE — 80048 BASIC METABOLIC PNL TOTAL CA: CPT

## 2024-08-06 PROCEDURE — 2500000003 HC RX 250 WO HCPCS: Performed by: HOSPITALIST

## 2024-08-06 PROCEDURE — 82962 GLUCOSE BLOOD TEST: CPT

## 2024-08-06 PROCEDURE — 2700000000 HC OXYGEN THERAPY PER DAY

## 2024-08-06 PROCEDURE — 6370000000 HC RX 637 (ALT 250 FOR IP)

## 2024-08-06 PROCEDURE — 2580000003 HC RX 258: Performed by: HOSPITALIST

## 2024-08-06 PROCEDURE — 99239 HOSP IP/OBS DSCHRG MGMT >30: CPT | Performed by: STUDENT IN AN ORGANIZED HEALTH CARE EDUCATION/TRAINING PROGRAM

## 2024-08-06 RX ORDER — IBUPROFEN 200 MG
TABLET ORAL
Qty: 14 G | Refills: 1 | Status: SHIPPED | OUTPATIENT
Start: 2024-08-07 | End: 2024-08-07

## 2024-08-06 RX ORDER — IBUPROFEN 200 MG
TABLET ORAL DAILY
Status: DISCONTINUED | OUTPATIENT
Start: 2024-08-06 | End: 2024-08-07 | Stop reason: HOSPADM

## 2024-08-06 RX ADMIN — INSULIN LISPRO 10 UNITS: 100 INJECTION, SOLUTION INTRAVENOUS; SUBCUTANEOUS at 07:57

## 2024-08-06 RX ADMIN — CALCIUM ACETATE 2668 MG: 667 CAPSULE ORAL at 13:37

## 2024-08-06 RX ADMIN — SENNOSIDES AND DOCUSATE SODIUM 2 TABLET: 50; 8.6 TABLET ORAL at 07:46

## 2024-08-06 RX ADMIN — PANTOPRAZOLE SODIUM 40 MG: 40 TABLET, DELAYED RELEASE ORAL at 16:54

## 2024-08-06 RX ADMIN — OXYCODONE 5 MG: 5 TABLET ORAL at 06:29

## 2024-08-06 RX ADMIN — CYANOCOBALAMIN TAB 1000 MCG 500 MCG: 1000 TAB at 07:46

## 2024-08-06 RX ADMIN — INSULIN LISPRO 10 UNITS: 100 INJECTION, SOLUTION INTRAVENOUS; SUBCUTANEOUS at 13:37

## 2024-08-06 RX ADMIN — OXYCODONE 5 MG: 5 TABLET ORAL at 14:09

## 2024-08-06 RX ADMIN — POLYMYXIN B SULFATE, BACITRACIN ZINC, NEOMYCIN SULFATE: 5000; 3.5; 4 OINTMENT TOPICAL at 16:59

## 2024-08-06 RX ADMIN — POLYETHYLENE GLYCOL 3350 17 G: 17 POWDER, FOR SOLUTION ORAL at 07:46

## 2024-08-06 RX ADMIN — INSULIN GLARGINE 20 UNITS: 100 INJECTION, SOLUTION SUBCUTANEOUS at 07:48

## 2024-08-06 RX ADMIN — HYDROCORTISONE: 25 CREAM TOPICAL at 21:08

## 2024-08-06 RX ADMIN — DOXYCYCLINE HYCLATE 100 MG: 100 CAPSULE ORAL at 07:47

## 2024-08-06 RX ADMIN — SODIUM CHLORIDE, PRESERVATIVE FREE 10 ML: 5 INJECTION INTRAVENOUS at 21:07

## 2024-08-06 RX ADMIN — DOXYCYCLINE HYCLATE 100 MG: 100 CAPSULE ORAL at 21:08

## 2024-08-06 RX ADMIN — HYDROCORTISONE: 25 CREAM TOPICAL at 07:59

## 2024-08-06 RX ADMIN — PANTOPRAZOLE SODIUM 40 MG: 40 TABLET, DELAYED RELEASE ORAL at 06:29

## 2024-08-06 RX ADMIN — CALCIUM ACETATE 2668 MG: 667 CAPSULE ORAL at 07:46

## 2024-08-06 RX ADMIN — CALCIUM ACETATE 2668 MG: 667 CAPSULE ORAL at 16:53

## 2024-08-06 RX ADMIN — MUPIROCIN: 20 OINTMENT TOPICAL at 17:00

## 2024-08-06 ASSESSMENT — PAIN DESCRIPTION - LOCATION
LOCATION: FACE

## 2024-08-06 ASSESSMENT — PAIN SCALES - GENERAL
PAINLEVEL_OUTOF10: 4
PAINLEVEL_OUTOF10: 4
PAINLEVEL_OUTOF10: 0
PAINLEVEL_OUTOF10: 7
PAINLEVEL_OUTOF10: 4
PAINLEVEL_OUTOF10: 7
PAINLEVEL_OUTOF10: 8

## 2024-08-06 ASSESSMENT — PAIN SCALES - WONG BAKER
WONGBAKER_NUMERICALRESPONSE: HURTS A LITTLE BIT
WONGBAKER_NUMERICALRESPONSE: HURTS A LITTLE BIT

## 2024-08-06 ASSESSMENT — PAIN DESCRIPTION - ORIENTATION
ORIENTATION: RIGHT

## 2024-08-06 ASSESSMENT — PAIN DESCRIPTION - DESCRIPTORS
DESCRIPTORS: ACHING;THROBBING
DESCRIPTORS: ACHING;SORE;TENDER
DESCRIPTORS: ACHING;THROBBING
DESCRIPTORS: ACHING;THROBBING

## 2024-08-06 ASSESSMENT — PAIN - FUNCTIONAL ASSESSMENT: PAIN_FUNCTIONAL_ASSESSMENT: PREVENTS OR INTERFERES SOME ACTIVE ACTIVITIES AND ADLS

## 2024-08-06 NOTE — DISCHARGE INSTRUCTIONS
Your information:  Name: Michelle Nettles  : 1985    Your instructions:    Right face: wash with soap & water or normal saline, apply neosporin to wound bed, follow with band aide every day for at least 10 days or when healed if sooner  Follow up with your doctor to monitor healing of wound    What to do after you leave the hospital:    Recommended diet: {diet:32743}    Recommended activity: {discharge activity:16723}        The following personal items were collected during your admission and were returned to you:    Belongings  Dental Appliances: None  Vision - Corrective Lenses: Eyeglasses  Hearing Aid: None  Clothing: Footwear, Pants, Shirt, Socks, Undergarments, At bedside  Jewelry: None  Body Piercings Removed: No  Electronic Devices: None  Weapons (Notify Protective Services/Security): None  Other Valuables: At bedside  Home Medications: None  Valuables Given To: Other (Comment)  Provide Name(s) of Who Valuable(s) Were Given To: self    Information obtained by:  By signing below, I understand that if any problems occur once I leave the hospital I am to contact ***.  I understand and acknowledge receipt of the instructions indicated above.

## 2024-08-06 NOTE — FLOWSHEET NOTE
Inpatient Wound Care(initial evaluation)  4502a    Admit Date: 7/31/2024 11:06 PM    Reason for consult:  right face I & D    Significant history:  per H & P  Chief Complaint:  had concerns including Shortness of Breath (Increased SOB over the past 2 days Missed dialysis last TX was thur. ).  PMH of ESRD on dialysis, anemia, type 1 diabetes, HTN, chronic respiratory failure with home oxygen, and HFpEF     Pt presented to ED for evaluation of shortness of breath.  Patient reports she was supposed to go to dialysis today, she was too weak to show up, she has been feeling more shortness of breath despite of being on home oxygen, patient was seen dyspneic on nasal cannula oxygen in the ED, appears to be elevated tachypneic as well, she denies fever, chills, cough, she denies any chest pain, denies nausea vomiting denies diarrhea.  Patient feels her arms and legs are getting more swollen.     Wound history:  patient states area started out as a pimple    Findings:     08/06/24 1441   Wound 08/06/24 Face Right   Date First Assessed: 08/06/24   Present on Original Admission: No  Primary Wound Type: Surgical Type  Location: Face  Wound Location Orientation: Right   Wound Image    Wound Etiology   (abscess- I & D @ bedside)   Dressing Status New dressing applied   Wound Cleansed Cleansed with saline   Dressing/Treatment Alginate  (stratasorb)   Wound Length (cm) 1.6 cm   Wound Width (cm) 1.6 cm   Wound Depth (cm) 0.6 cm   Wound Surface Area (cm^2) 2.56 cm^2   Wound Volume (cm^3) 1.536 cm^3   Wound Assessment Pink/red   Drainage Amount Small (< 25%)   Drainage Description Serosanguinous   Odor None   Dolores-wound Assessment Edematous     Bedside debridement completed in room 8/2  **Informed Consent**    The patient has given verbal consent to have photos taken of right face and inserted into their chart as part of their permanent medical record for purposes of documentation, treatment management and/or medical review.   All

## 2024-08-06 NOTE — FLOWSHEET NOTE
08/06/24 1230   Vital Signs   BP 94/60   Temp 97.9 °F (36.6 °C)   Pulse 100   Respirations 16   Weight - Scale 66.2 kg (145 lb 15.1 oz)   Percent Weight Change -3.64   Pain Assessment   Pain Assessment None - Denies Pain   Pain Level 0   Hemodialysis Fistula/Graft Arteriovenous fistula Left Arm   Placement Date: 11/05/19   Pre-existing: Yes  Access Type: Arteriovenous fistula  Orientation: Left  Access Location: Arm   Site Assessment Clean, dry & intact   Thrill Present   Bruit Present   Status Deaccessed   Post-Hemodialysis Assessment   Post-Treatment Procedures Blood returned;Access bleeding time < 10 minutes   Machine Disinfection Process Exterior Machine Disinfection   Rinseback Volume (ml) 300 ml   Blood Volume Processed (Liters) 100.6 L   Dialyzer Clearance Moderately streaked   Duration of Treatment (minutes) 240 minutes   Heparin Amount Administered During Treatment (mL) 0 mL   Hemodialysis Intake (ml) 1000 ml   Hemodialysis Output (ml) 3500 ml   NET Removed (ml) 2500   Tolerated Treatment Fair   Interventions Taken Fluid bolus;Other (Comment)   Patient Response to Treatment pt hypotensive post HD tx 500cc bolus given recycled bp 94/60  pt a/o vitals stable nurse to nurse report given to floor charge nurse pt sent back to room via transport   Patient Disposition Remain in ICU/ED        08/06/24 1230   Vital Signs   BP 94/60   Temp 97.9 °F (36.6 °C)   Pulse 100   Respirations 16   Weight - Scale 66.2 kg (145 lb 15.1 oz)   Percent Weight Change -3.64   Pain Assessment   Pain Assessment None - Denies Pain   Pain Level 0   Hemodialysis Fistula/Graft Arteriovenous fistula Left Arm   Placement Date: 11/05/19   Pre-existing: Yes  Access Type: Arteriovenous fistula  Orientation: Left  Access Location: Arm   Site Assessment Clean, dry & intact   Thrill Present   Bruit Present   Status Deaccessed   Post-Hemodialysis Assessment   Post-Treatment Procedures Blood returned;Access bleeding time < 10 minutes

## 2024-08-06 NOTE — PLAN OF CARE
Problem: Discharge Planning  Goal: Discharge to home or other facility with appropriate resources  Outcome: Progressing  Flowsheets (Taken 8/6/2024 0820)  Discharge to home or other facility with appropriate resources: Identify barriers to discharge with patient and caregiver

## 2024-08-07 VITALS
HEART RATE: 100 BPM | SYSTOLIC BLOOD PRESSURE: 122 MMHG | WEIGHT: 145.94 LBS | TEMPERATURE: 97 F | OXYGEN SATURATION: 100 % | DIASTOLIC BLOOD PRESSURE: 59 MMHG | RESPIRATION RATE: 20 BRPM | BODY MASS INDEX: 29.42 KG/M2 | HEIGHT: 59 IN

## 2024-08-07 LAB
ANION GAP SERPL CALCULATED.3IONS-SCNC: 12 MMOL/L (ref 7–16)
BASOPHILS # BLD: 0.03 K/UL (ref 0–0.2)
BASOPHILS NFR BLD: 1 % (ref 0–2)
BUN SERPL-MCNC: 27 MG/DL (ref 6–20)
CALCIUM SERPL-MCNC: 9.1 MG/DL (ref 8.6–10.2)
CHLORIDE SERPL-SCNC: 99 MMOL/L (ref 98–107)
CO2 SERPL-SCNC: 28 MMOL/L (ref 22–29)
CREAT SERPL-MCNC: 4.4 MG/DL (ref 0.5–1)
EOSINOPHIL # BLD: 0.24 K/UL (ref 0.05–0.5)
EOSINOPHILS RELATIVE PERCENT: 5 % (ref 0–6)
ERYTHROCYTE [DISTWIDTH] IN BLOOD BY AUTOMATED COUNT: 16.1 % (ref 11.5–15)
GFR, ESTIMATED: 12 ML/MIN/1.73M2
GLUCOSE BLD-MCNC: 192 MG/DL (ref 74–99)
GLUCOSE BLD-MCNC: 53 MG/DL (ref 74–99)
GLUCOSE BLD-MCNC: 56 MG/DL (ref 74–99)
GLUCOSE BLD-MCNC: 96 MG/DL (ref 74–99)
GLUCOSE SERPL-MCNC: 134 MG/DL (ref 74–99)
HCT VFR BLD AUTO: 24.8 % (ref 34–48)
HGB BLD-MCNC: 8 G/DL (ref 11.5–15.5)
IMM GRANULOCYTES # BLD AUTO: <0.03 K/UL (ref 0–0.58)
IMM GRANULOCYTES NFR BLD: 0 % (ref 0–5)
LYMPHOCYTES NFR BLD: 1.28 K/UL (ref 1.5–4)
LYMPHOCYTES RELATIVE PERCENT: 25 % (ref 20–42)
MAGNESIUM SERPL-MCNC: 2 MG/DL (ref 1.6–2.6)
MCH RBC QN AUTO: 28.9 PG (ref 26–35)
MCHC RBC AUTO-ENTMCNC: 32.3 G/DL (ref 32–34.5)
MCV RBC AUTO: 89.5 FL (ref 80–99.9)
MONOCYTES NFR BLD: 0.62 K/UL (ref 0.1–0.95)
MONOCYTES NFR BLD: 12 % (ref 2–12)
NEUTROPHILS NFR BLD: 57 % (ref 43–80)
NEUTS SEG NFR BLD: 2.96 K/UL (ref 1.8–7.3)
PHOSPHATE SERPL-MCNC: 3.2 MG/DL (ref 2.5–4.5)
PLATELET # BLD AUTO: 250 K/UL (ref 130–450)
PMV BLD AUTO: 10.2 FL (ref 7–12)
POTASSIUM SERPL-SCNC: 4.2 MMOL/L (ref 3.5–5)
RBC # BLD AUTO: 2.77 M/UL (ref 3.5–5.5)
SODIUM SERPL-SCNC: 139 MMOL/L (ref 132–146)
WBC OTHER # BLD: 5.2 K/UL (ref 4.5–11.5)

## 2024-08-07 PROCEDURE — 6370000000 HC RX 637 (ALT 250 FOR IP): Performed by: STUDENT IN AN ORGANIZED HEALTH CARE EDUCATION/TRAINING PROGRAM

## 2024-08-07 PROCEDURE — 2580000003 HC RX 258

## 2024-08-07 PROCEDURE — 6360000002 HC RX W HCPCS: Performed by: HOSPITALIST

## 2024-08-07 PROCEDURE — 83735 ASSAY OF MAGNESIUM: CPT

## 2024-08-07 PROCEDURE — 2700000000 HC OXYGEN THERAPY PER DAY

## 2024-08-07 PROCEDURE — 2500000003 HC RX 250 WO HCPCS: Performed by: HOSPITALIST

## 2024-08-07 PROCEDURE — 6370000000 HC RX 637 (ALT 250 FOR IP)

## 2024-08-07 PROCEDURE — 6370000000 HC RX 637 (ALT 250 FOR IP): Performed by: HOSPITALIST

## 2024-08-07 PROCEDURE — 84100 ASSAY OF PHOSPHORUS: CPT

## 2024-08-07 PROCEDURE — 36415 COLL VENOUS BLD VENIPUNCTURE: CPT

## 2024-08-07 PROCEDURE — 80048 BASIC METABOLIC PNL TOTAL CA: CPT

## 2024-08-07 PROCEDURE — 85025 COMPLETE CBC W/AUTO DIFF WBC: CPT

## 2024-08-07 PROCEDURE — 82962 GLUCOSE BLOOD TEST: CPT

## 2024-08-07 PROCEDURE — 2580000003 HC RX 258: Performed by: HOSPITALIST

## 2024-08-07 RX ORDER — HYDROCORTISONE 25 MG/G
1 CREAM TOPICAL 2 TIMES DAILY
Qty: 28 G | Refills: 2 | Status: SHIPPED | OUTPATIENT
Start: 2024-08-07 | End: 2024-09-06

## 2024-08-07 RX ORDER — IBUPROFEN 200 MG
TABLET ORAL
Qty: 14 G | Refills: 1 | Status: SHIPPED | OUTPATIENT
Start: 2024-08-07

## 2024-08-07 RX ORDER — PANTOPRAZOLE SODIUM 40 MG/1
40 TABLET, DELAYED RELEASE ORAL
Qty: 30 TABLET | Refills: 3 | Status: SHIPPED | OUTPATIENT
Start: 2024-08-07

## 2024-08-07 RX ORDER — DOXYCYCLINE HYCLATE 100 MG/1
100 CAPSULE ORAL EVERY 12 HOURS SCHEDULED
Qty: 14 CAPSULE | Refills: 0 | Status: SHIPPED | OUTPATIENT
Start: 2024-08-07 | End: 2024-08-14

## 2024-08-07 RX ORDER — OXYCODONE HYDROCHLORIDE 5 MG/1
5 TABLET ORAL EVERY 8 HOURS PRN
Qty: 10 TABLET | Refills: 0 | Status: SHIPPED | OUTPATIENT
Start: 2024-08-07 | End: 2024-08-12

## 2024-08-07 RX ORDER — WATER 10 ML/10ML
INJECTION INTRAMUSCULAR; INTRAVENOUS; SUBCUTANEOUS
Status: COMPLETED
Start: 2024-08-07 | End: 2024-08-07

## 2024-08-07 RX ORDER — POLYETHYLENE GLYCOL 3350 17 G/17G
17 POWDER, FOR SOLUTION ORAL DAILY
Qty: 527 G | Refills: 1 | Status: SHIPPED | OUTPATIENT
Start: 2024-08-07 | End: 2024-10-08

## 2024-08-07 RX ORDER — SENNA AND DOCUSATE SODIUM 50; 8.6 MG/1; MG/1
2 TABLET, FILM COATED ORAL DAILY
Qty: 60 TABLET | Refills: 0 | Status: SHIPPED | OUTPATIENT
Start: 2024-08-07 | End: 2024-09-06

## 2024-08-07 RX ADMIN — PANTOPRAZOLE SODIUM 40 MG: 40 TABLET, DELAYED RELEASE ORAL at 06:39

## 2024-08-07 RX ADMIN — INSULIN LISPRO 10 UNITS: 100 INJECTION, SOLUTION INTRAVENOUS; SUBCUTANEOUS at 11:53

## 2024-08-07 RX ADMIN — INSULIN LISPRO 10 UNITS: 100 INJECTION, SOLUTION INTRAVENOUS; SUBCUTANEOUS at 09:21

## 2024-08-07 RX ADMIN — HYDROCORTISONE: 25 CREAM TOPICAL at 09:26

## 2024-08-07 RX ADMIN — SENNOSIDES AND DOCUSATE SODIUM 2 TABLET: 50; 8.6 TABLET ORAL at 09:22

## 2024-08-07 RX ADMIN — OXYCODONE 5 MG: 5 TABLET ORAL at 06:39

## 2024-08-07 RX ADMIN — POLYMYXIN B SULFATE, BACITRACIN ZINC, NEOMYCIN SULFATE: 5000; 3.5; 4 OINTMENT TOPICAL at 09:25

## 2024-08-07 RX ADMIN — SODIUM CHLORIDE, PRESERVATIVE FREE 10 ML: 5 INJECTION INTRAVENOUS at 09:23

## 2024-08-07 RX ADMIN — CALCIUM ACETATE 2668 MG: 667 CAPSULE ORAL at 11:53

## 2024-08-07 RX ADMIN — CALCIUM ACETATE 2668 MG: 667 CAPSULE ORAL at 09:22

## 2024-08-07 RX ADMIN — GLUCAGON 1 MG: 1 INJECTION, POWDER, LYOPHILIZED, FOR SOLUTION INTRAMUSCULAR; INTRAVENOUS at 13:46

## 2024-08-07 RX ADMIN — CLOPIDOGREL BISULFATE 75 MG: 75 TABLET ORAL at 09:22

## 2024-08-07 RX ADMIN — CYANOCOBALAMIN TAB 1000 MCG 500 MCG: 1000 TAB at 09:22

## 2024-08-07 RX ADMIN — WATER: 1 INJECTION INTRAMUSCULAR; INTRAVENOUS; SUBCUTANEOUS at 13:51

## 2024-08-07 RX ADMIN — DOXYCYCLINE HYCLATE 100 MG: 100 CAPSULE ORAL at 09:22

## 2024-08-07 RX ADMIN — INSULIN GLARGINE 20 UNITS: 100 INJECTION, SOLUTION SUBCUTANEOUS at 09:22

## 2024-08-07 ASSESSMENT — PAIN SCALES - GENERAL
PAINLEVEL_OUTOF10: 0
PAINLEVEL_OUTOF10: 0
PAINLEVEL_OUTOF10: 5

## 2024-08-07 ASSESSMENT — PAIN DESCRIPTION - DESCRIPTORS: DESCRIPTORS: DISCOMFORT;DULL;SORE

## 2024-08-07 ASSESSMENT — PAIN DESCRIPTION - LOCATION: LOCATION: FACE

## 2024-08-07 ASSESSMENT — PAIN DESCRIPTION - ORIENTATION: ORIENTATION: RIGHT

## 2024-08-07 NOTE — PLAN OF CARE
Problem: Discharge Planning  Goal: Discharge to home or other facility with appropriate resources  Outcome: Progressing     Problem: ABCDS Injury Assessment  Goal: Absence of physical injury  Outcome: Progressing     Problem: Pain  Goal: Verbalizes/displays adequate comfort level or baseline comfort level  Outcome: Progressing     Problem: Chronic Conditions and Co-morbidities  Goal: Patient's chronic conditions and co-morbidity symptoms are monitored and maintained or improved  Outcome: Progressing     Problem: Skin/Tissue Integrity  Goal: Absence of new skin breakdown  Description: 1.  Monitor for areas of redness and/or skin breakdown  2.  Assess vascular access sites hourly  3.  Every 4-6 hours minimum:  Change oxygen saturation probe site  4.  Every 4-6 hours:  If on nasal continuous positive airway pressure, respiratory therapy assess nares and determine need for appliance change or resting period.  Outcome: Progressing

## 2024-08-07 NOTE — PLAN OF CARE
Problem: Discharge Planning  Goal: Discharge to home or other facility with appropriate resources  8/7/2024 1023 by Walker Busch RN  Outcome: Progressing  Flowsheets (Taken 8/7/2024 0919)  Discharge to home or other facility with appropriate resources: Identify barriers to discharge with patient and caregiver  8/7/2024 0303 by Angelita Ying RN  Outcome: Progressing     Problem: ABCDS Injury Assessment  Goal: Absence of physical injury  8/7/2024 1023 by Walker Busch RN  Outcome: Progressing  8/7/2024 0303 by Angelita Ying RN  Outcome: Progressing     Problem: Pain  Goal: Verbalizes/displays adequate comfort level or baseline comfort level  8/7/2024 1023 by Walker Busch RN  Outcome: Progressing  Flowsheets (Taken 8/7/2024 0919)  Verbalizes/displays adequate comfort level or baseline comfort level: Encourage patient to monitor pain and request assistance  8/7/2024 0303 by Angelita Ying RN  Outcome: Progressing     Problem: Chronic Conditions and Co-morbidities  Goal: Patient's chronic conditions and co-morbidity symptoms are monitored and maintained or improved  8/7/2024 0303 by Angelita Ying RN  Outcome: Progressing     Problem: Skin/Tissue Integrity  Goal: Absence of new skin breakdown  Description: 1.  Monitor for areas of redness and/or skin breakdown  2.  Assess vascular access sites hourly  3.  Every 4-6 hours minimum:  Change oxygen saturation probe site  4.  Every 4-6 hours:  If on nasal continuous positive airway pressure, respiratory therapy assess nares and determine need for appliance change or resting period.  8/7/2024 0303 by Angelita Ying RN  Outcome: Progressing     Problem: Infection - Adult  Goal: Absence of fever/infection during anticipated neutropenic period  8/7/2024 0304 by Angelita Ying RN  Outcome: Progressing

## 2024-08-07 NOTE — CARE COORDINATION
CM Update: Discharge order noted. Plan is Home with no needs. She will call family or use insurance for transport. (TF)          HUGO KeeN,RN  Case Management  [phone]    
CM Update: Met with pt at bedside to discuss transition of care. Plan at discharge is Home with no needs. She uses her insurance for transport. POD #3 I&D facial abscess. Will need final wound care and antibiotics to determine if hhc is needed. CM to follow(TF)            HUGO KeeN,RN  Case Management  [phone]    
CM Update: Plan at discharge is Home with no needs. She uses her insurance for transport. Pulmonology, Nephrology and Cardiology following. CM to follow (TF)        HUGO KeeN,RN  Case Management  [phone]    
CM Update: Plan at discharge is Home with no needs. She will call family or use insurance for transport. Plavix resumed yesterday, awaiting H/H. CM to follow(TF)        HUGO KeeN,RN  Case Management  [phone]    
related to the following treatment goals of Hyperkalemia [E87.5]  Acute hyperkalemia [E87.5]  Hypervolemia, unspecified hypervolemia type [E87.70]    IF APPLICABLE: The Patient and/or patient representative Michelle and her family were provided with a choice of provider and agrees with the discharge plan. Freedom of choice list with basic dialogue that supports the patient's individualized plan of care/goals and shares the quality data associated with the providers was provided to:     Patient Representative Name:       The Patient and/or Patient Representative Agree with the Discharge Plan?      ORACIO RODRÍGUEZ RN  Case Management Department  Ph: [phone]

## 2024-08-07 NOTE — PROGRESS NOTES
[facility] Hospitalist Progress Note    SYNOPSIS: Patient admitted on 2024 for Acute hyperkalemia    This is a 39-year-old lady with past medical history of ESRD on hemodialysis, anemia, type 1 diabetes mellitus, hypertension, chronic respiratory failure with home oxygen, HFpEF who presented to [facility] ED on 2024 for evaluation of shortness of breath.  She was supposed to go to hemodialysis on 2024 however patient was too weak to show up and had increased shortness of breath despite on oxygen, patient was dyspneic on nasal cannula in the ED.  Patient feels that her arms and legs are getting more swollen.    2024 patient has been started on BiPAP.  Receiving hemodialysis.  Hemoglobin dropped down to 6.2.  Receiving 1 unit of packed red blood cells.  Nephrology, cardiology, pulmonology has been consulted.     patient states that her shortness of breath is improved after hemodialysis.  Seen at hemodialysis unit.  She was concerned about her facial abscess on the right.  General surgery has been consulted.    8/3 patient had large bloody bowel movement.  General surgery has been consulted.  She has received 2 units of PRBC so far.  S/p I&D of right facial abscess.  Breathing is much better    SUBJECTIVE:  Stable overnight. No other overnight issues reported.   Patient seen and examined  Records reviewed.         Temp (24hrs), Av °F (36.7 °C), Min:97.8 °F (36.6 °C), Max:98.1 °F (36.7 °C)    DIET: ADULT DIET; Regular; Low Potassium (Less than 3000 mg/day)  CODE: Full Code  No intake or output data in the 24 hours ending 24 1228      Review of Systems  All bolded are positive; please see HPI  General:  Fever, chills, diaphoresis, fatigue, malaise, night sweats, weight loss  Psychological:  Anxiety, disorientation, hallucinations.  ENT:  Epistaxis, headaches, vertigo, visual changes.  Cardiovascular:  Chest pain, irregular heartbeats, palpitations, paroxysmal 
       [facility] Hospitalist Progress Note    SYNOPSIS: Patient admitted on 2024 for Acute hyperkalemia    This is a 39-year-old lady with past medical history of ESRD on hemodialysis, anemia, type 1 diabetes mellitus, hypertension, chronic respiratory failure with home oxygen, HFpEF who presented to [facility] ED on 2024 for evaluation of shortness of breath.  She was supposed to go to hemodialysis on 2024 however patient was too weak to show up and had increased shortness of breath despite on oxygen, patient was dyspneic on nasal cannula in the ED.  Patient feels that her arms and legs are getting more swollen.    2024 patient has been started on BiPAP.  Receiving hemodialysis.  Hemoglobin dropped down to 6.2.  Receiving 1 unit of packed red blood cells.  Nephrology, cardiology, pulmonology has been consulted.     patient states that her shortness of breath is improved after hemodialysis.  Seen at hemodialysis unit.  She was concerned about her facial abscess on the right.  General surgery has been consulted.    8/3 patient had large bloody bowel movement.  General surgery has been consulted.  She has received 2 units of PRBC so far.  S/p I&D of right facial abscess.  Breathing is much better     patient's blood glucose was elevated to more than 500, patient appears to have labile blood glucose.  Postop wound on face to be cleaned daily with saline and cover with dry dressing.  She underwent colonoscopy last month with Dr. Gonzalez.  Hemorrhoid was the only thing that was found.  Patient does have little pain with defecation.  Anusol was started.  No plans for colonoscopy or EGD.  Await repeat BMP, nasal swab for MRSA screen.  Potential discharge later today or in next 24 hours.    SUBJECTIVE:  Stable overnight. No other overnight issues reported.   Patient seen and examined  Records reviewed.         Temp (24hrs), Av.8 °F (36.6 °C), Min:97.8 °F (36.6 °C), Max:97.9 °F 
     Pulmonary Critical Care Medicine           PULMONARY   SERVICE DAILY PROGRESS  NOTE     2024   Hospital LOS:  LOS: 5 days     Impression / Recommendations:  Chronic hypoxic respiratory insufficiency secondary to-chronic pulmonary vascular congestion and volume overload in the setting of ESRD on intermittent hemodialysis and several missed dialysis sessions.    Chronic anemia / anemia of chronic disease    Bilateral pleural effusion right more than left-this is a chronic process and recurrent secondary to underlying ESRD and volume mismanagement. (Missing dialysis sessions)     Right facial abscess    -Nephrology is following for hemodialysis, last iHD-   -Continues to be on 4 to 5 L nasal cannula at this time, oxygenating well.  Continue to wean as tolerated.  -Continue incentive spirometry and flutter valve  -Out of bed to chair/PT OT will help  -The amount of pleural fluid is not significant and it is expected to get better with serial dialysis sessions hence no thoracentesis is indicated   -Transfuse for hemoglobin of less than 7    Interval History/Event Changes:  Feels better this morning  Sitting on the side of the bed  No symptoms/asymptomatic at rest  Just finished dialysis, tolerated well  No new complaints      Allergies  Allergies   Allergen Reactions    Vancomycin Itching       Review of Systems    A pertinent review of systems was performed and was otherwise non-contributory.    Vitals-     /66   Pulse 94   Temp 96.8 °F (36 °C) (Temporal)   Resp 20   Ht 1.499 m (4' 11.02\")   Wt 68.7 kg (151 lb 7.3 oz)   SpO2 98%   BMI 30.57 kg/m²    Tmax: Temp (24hrs), Av.4 °F (36.3 °C), Min:96.8 °F (36 °C), Max:98.3 °F (36.8 °C)      Hemodynamics:  Cuff:   Systolic (24hrs), Av , Min:107 , Max:145   /Diastolic (24hrs), Av, Min:51, Max:72    Cuff MAP:MAP (mmHg)  Av.8  Min: 68  Max: 137  P:   Pulse  Av.8  Min: 90  Max: 95      Airway:     Observed RR: Resp  Av.3  Min: 
     Pulmonary Critical Care Medicine           PULMONARY   SERVICE DAILY PROGRESS  NOTE     2024   Hospital LOS:  LOS: 6 days     Impression / Recommendations:  Chronic hypoxic respiratory insufficiency secondary to-chronic pulmonary vascular congestion and volume overload in the setting of ESRD on intermittent hemodialysis and several missed dialysis sessions.    Chronic anemia / anemia of chronic disease    Bilateral pleural effusion right more than left-this is a chronic process and recurrent secondary to underlying ESRD and volume mismanagement. (Missing dialysis sessions)     Right facial abscess    -Nephrology is following for hemodialysis, last iHD-   -Continues to be on 4 to 5 L nasal cannula at this time, oxygenating well.  Continue to wean as tolerated.  -Continue incentive spirometry and flutter valve  -Out of bed to chair/PT OT will help  -The amount of pleural fluid is not significant and it is expected to get better with serial dialysis sessions hence no thoracentesis is indicated   -Transfuse for hemoglobin of less than 7    Interval History/Event Changes:  Feels better this morning  Sitting on the side of the bed  No symptoms/asymptomatic at rest  Just finished dialysis, tolerated well  No new complaints      Allergies  Allergies   Allergen Reactions    Vancomycin Itching       Review of Systems    A pertinent review of systems was performed and was otherwise non-contributory.    Vitals-     BP (!) 93/56   Pulse 93   Temp 98 °F (36.7 °C) (Oral)   Resp 18   Ht 1.499 m (4' 11.02\")   Wt 66.2 kg (145 lb 15.1 oz)   SpO2 97%   BMI 29.46 kg/m²    Tmax: Temp (24hrs), Av.4 °F (36.3 °C), Min:96.8 °F (36 °C), Max:98 °F (36.7 °C)      Hemodynamics:  Cuff:   Systolic (24hrs), Av , Min:93 , Max:121   /Diastolic (24hrs), Av, Min:54, Max:74    Cuff MAP:MAP (mmHg)  Av.8  Min: 68  Max: 137  P:   Pulse  Av.2  Min: 83  Max: 100      Airway:     Observed RR: Resp  Av.2  Min: 
     Pulmonary Critical Care Medicine           PULMONARY  CRITICAL CARE   SERVICE DAILY PROGRESS  NOTE     2024   Hospital LOS:  LOS: 4 days     Impression / Recommendations:  Chronic hypoxic respiratory insufficiency secondary to-chronic pulmonary vascular congestion and volume overload in the setting of ESRD on intermittent hemodialysis and several missed dialysis sessions.    Chronic anemia / anemia of chronic disease    Bilateral pleural effusion right more than left-this is a chronic process and recurrent secondary to underlying ESRD and volume mismanagement. (Missing dialysis sessions)     Right facial abscess    -Nephrology is following for hemodialysis, last iHD-   -Continues to be on 4 to 5 L nasal cannula at this time, oxygenating well.  Continue to wean as tolerated.  -Continue incentive spirometry and flutter valve  -Out of bed to chair/PT OT will help  -The amount of pleural fluid is not significant and it is expected to get better with serial dialysis sessions hence no thoracentesis is indicated   -Transfuse for hemoglobin of less than 7    Interval History/Event Changes:  Feels better this morning  Sitting on the side of the bed  No symptoms/asymptomatic at rest  Just finished dialysis, tolerated well  No new complaints      Allergies  Allergies   Allergen Reactions    Vancomycin Itching       Review of Systems    A pertinent review of systems was performed and was otherwise non-contributory.    Vitals-     BP (!) 119/46   Pulse 100   Temp 97.9 °F (36.6 °C) (Tympanic)   Resp 18   Wt 63.8 kg (140 lb 10.5 oz)   SpO2 97%   BMI 28.41 kg/m²    Tmax: Temp (24hrs), Av.9 °F (36.6 °C), Min:97.8 °F (36.6 °C), Max:97.9 °F (36.6 °C)      Hemodynamics:  Cuff:   Systolic (24hrs), Av , Min:104 , Max:132   /Diastolic (24hrs), Av, Min:46, Max:58    Cuff MAP:MAP (mmHg)  Av.8  Min: 68  Max: 137  P:   Pulse  Av.8  Min: 98  Max: 110      Airway:     Observed RR: Resp  Av  Min: 
     Pulmonary Critical Care Medicine           PULMONARY  CRITICAL CARE   SERVICE DAILY PROGRESS  NOTE     8/3/2024   Hospital LOS:  LOS: 3 days     Impression / Recommendations:  Chronic hypoxic respiratory insufficiency secondary to-chronic pulmonary vascular congestion and volume overload in the setting of ESRD on intermittent hemodialysis and several missed dialysis sessions.    Chronic anemia / anemia of chronic disease    Bilateral pleural effusion right more than left-this is a chronic process and recurrent secondary to underlying ESRD and volume mismanagement. (Missing dialysis sessions)     Right facial abscess    -Nephrology is following for hemodialysis, getting dialysis this morning on my physical exam.  -Continues to be on 4 L nasal cannula at this time, oxygenating well.  Continue to wean as tolerated.  -Continue incentive spirometry and flutter valve  -Out of bed to chair/PT OT will help  -The amount of pleural fluid is not significant and it is expected to get better with serial dialysis sessions hence no thoracentesis is indicated   -Transfuse for hemoglobin of less than 7    Interval History/Event Changes:  Interviewed and examined in the dialysis suite  No symptoms/asymptomatic at rest  Just finished dialysis, tolerated well  No new complaints  Feels fair    Allergies  Allergies   Allergen Reactions    Vancomycin Itching       Review of Systems    A pertinent review of systems was performed and was otherwise non-contributory.    Vitals-     BP (!) 107/54   Pulse (!) 104   Temp 97.6 °F (36.4 °C) (Temporal)   Resp 18   SpO2 100%    Tmax: Temp (24hrs), Av.9 °F (36.6 °C), Min:97.6 °F (36.4 °C), Max:98.1 °F (36.7 °C)      Hemodynamics:  Cuff:   Systolic (24hrs), Av , Min:107 , Max:140   /Diastolic (24hrs), Av, Min:54, Max:94    Cuff MAP:MAP (mmHg)  Av.8  Min: 68  Max: 137  P:   Pulse  Av.2  Min: 98  Max: 110      Airway:     Observed RR: Resp  Av  Min: 16  Max: 18 
    INPATIENT CARDIOLOGY FOLLOW-UP    Name: Michelle Nettles    Age: 39 y.o.    Date of Admission: 7/31/2024 11:06 PM    Date of Service: 8/2/2024    Primary Cardiologist: New to me    Chief Complaint: Follow-up for elevated troponin, decompensated heart failure    Interim History:  No new overnight cardiac complaints. Currently with no complaints of CP, SOB, palpitations, dizziness, or lightheadedness. SR on telemetry.  No significant arrhythmia seen.  Feeling better overall.  On 4 L of oxygen via nasal cannula    Review of Systems:   Negative except as described above    Problem List:  Patient Active Problem List   Diagnosis    Poorly controlled type 1 diabetes mellitus (HCC)    Primary hypertension    Facial cellulitis    Acute hyperkalemia    Nasal polyp    Periorbital cellulitis of right eye    Periorbital cellulitis    Vitamin B deficiency, unspecified    Nasal congestion    Hidradenitis suppurativa    Symptomatic anemia    Normocytic anemia due to blood loss    Acute decompensated heart failure (HCC)    Gall stone    Dietary noncompliance    Metabolic encephalopathy    Hypoxia    Hypotension    History of venous thromboembolism    Chronic systolic (congestive) heart failure    MRSA colonization    ESRD on hemodialysis ([facility])    SOB (shortness of breath)    Chronic hypoxic respiratory failure, on home oxygen therapy ([facility])    Acute on chronic anemia    Hyperglycemia due to diabetes mellitus (HCC)    Hypercalcemia    GI bleed    Hypoglycemia    Hypervolemia       Current Medications:    Current Facility-Administered Medications:     albuterol (PROVENTIL) (2.5 MG/3ML) 0.083% nebulizer solution 5 mg, 5 mg, Nebulization, 4x Daily PRN, Matthias Lamb MD    calcium acetate (PHOSLO) capsule 2,668 mg, 4 capsule, Oral, TID WC, Matthias Lamb MD, 2,668 mg at 08/01/24 1700    carvedilol (COREG) tablet 25 mg, 25 mg, Oral, Dinner, Matthias Lamb MD, 25 mg at 08/01/24 1730    clopidogrel (PLAVIX) tablet 75 mg, 75 mg, Oral, 
   08/01/24 0745   NIV Type   NIV Started/Stopped On   Equipment Type v60   Mode Bilevel   Mask Type Full face mask   Mask Size Small   Assessment   Pulse (!) 113   Respirations 17   SpO2 100 %   Level of Consciousness 1   Comfort Level Good   Using Accessory Muscles No   Mask Compliance Good   Skin Assessment Clean, dry, & intact   Skin Protection for O2 Device Yes   Orientation Middle   Location Nose   Intervention(s) Skin Barrier   Settings/Measurements   IPAP 10 cmH20   CPAP/EPAP 5 cmH2O   Vt (Measured) 336 mL   Rate Ordered 16   FiO2  70 %   I Time/ I Time % 0.75 s   Minute Volume (L/min) 5.6 Liters   Mask Leak (lpm) 58 lpm   Patient's Home Machine No   Alarm Settings   Alarms On Y   Low Pressure (cmH2O) 8 cmH2O   High Pressure (cmH2O) 26 cmH2O   RR Low (bpm) 10   RR High (bpm) 40 br/min     Date: 8/1/2024    Time: 7:48 AM    Patient Placed On BIPAP/CPAP/ Non-Invasive Ventilation?  No, patient found on BiPAP.    If no must comment.  Facial area red/color change? No           If YES are Blister/Lesion present?No   If yes must notify nursing staff  BIPAP/CPAP skin barrier?  Yes    Skin barrier type:mepilexlite       Comments:        Marti Johnson RCP  
   08/01/24 0820   NIV Type   NIV Started/Stopped On   Equipment Type v60   Mode Bilevel   Mask Type Full face mask   Mask Size Small   Assessment   Pulse (!) 110   Respirations 17   SpO2 96 %   Level of Consciousness 1   Comfort Level Good   Using Accessory Muscles No   Mask Compliance Good   Skin Assessment Clean, dry, & intact   Settings/Measurements   IPAP 10 cmH20   CPAP/EPAP 5 cmH2O   Vt (Measured) 390 mL   Rate Ordered 16   FiO2  40 %   Minute Volume (L/min) 7.5 Liters   Mask Leak (lpm) 57 lpm   Patient's Home Machine No   Alarm Settings   Alarms On Y   Low Pressure (cmH2O) 8 cmH2O   High Pressure (cmH2O) 26 cmH2O   RR Low (bpm) 10   RR High (bpm) 40 br/min       
   08/01/24 1137   NIV Type   NIV Started/Stopped On   Equipment Type v60   Mode Bilevel   Mask Type Full face mask   Mask Size Small   Assessment   Pulse (!) 122   Respirations 17   SpO2 100 %   Level of Consciousness 1   Comfort Level Good   Using Accessory Muscles No   Mask Compliance Good   Skin Assessment Clean, dry, & intact   Skin Protection for O2 Device Yes   Orientation Middle   Location Nose   Intervention(s) Skin Barrier   Settings/Measurements   IPAP 10 cmH20   CPAP/EPAP 5 cmH2O   Vt (Measured) 521 mL   Rate Ordered 16   FiO2  40 %   I Time/ I Time % 0.75 s   Minute Volume (L/min) 8.7 Liters   Mask Leak (lpm) 58 lpm   Patient's Home Machine No   Alarm Settings   Alarms On Y   Low Pressure (cmH2O) 8 cmH2O   High Pressure (cmH2O) 26 cmH2O   RR Low (bpm) 10   RR High (bpm) 40 br/min     Date: 8/1/2024    Time: 11:39 AM    Patient Placed On BIPAP/CPAP/ Non-Invasive Ventilation?  No, patient remains on BiPAP.    If no must comment.  Facial area red/color change? No           If YES are Blister/Lesion present?No   If yes must notify nursing staff  BIPAP/CPAP skin barrier?  Yes    Skin barrier type:mepilexlite       Comments:        Marti Johnson RCP  
  Physician Progress Note      PATIENT:               LAURA PRICE  [facility] #:                  [id number]  :                       1985  ADMIT DATE:       2024 11:06 PM  DISCH DATE:  RESPONDING  PROVIDER #:        Trenton Peters MD          QUERY TEXT:    Pt admitted with acute on chronic respiratory failure and hypervolemia from   missed dialysis and has CHF documented. If possible, please document in   progress notes and discharge summary further specificity regarding the acuity   of CHF:    The medical record reflects the following:  Risk Factors: ESRD, HTN  Clinical Indicators: Per cardiology progress note on  \"...Hypervolemia   likely  missing dialysis x 1 week.  Concern for acute heart failure...\",   Per internal med progress note on  \"...Volume overload...HFpEF...\"  Treatment: HD with ultrafiltration, cardiology consult, TTE, strict intake and   output, daily weights    Thank you,  Aleta Alvarez, RN, BSN, CDIS  Clinical Documentation Integrity  [email or web address]  Options provided:  -- Acute on Chronic Diastolic CHF/HFpEF  -- Acute Diastolic CHF/HFpEF  -- Chronic Diastolic CHF/HFpEF  -- Other - I will add my own diagnosis  -- Disagree - Not applicable / Not valid  -- Disagree - Clinically unable to determine / Unknown  -- Refer to Clinical Documentation Reviewer    PROVIDER RESPONSE TEXT:    This patient has chronic diastolic CHF/HFpEF.    Query created by: Aleta Alvarez on 2024 3:07 PM      Electronically signed by:  Trenton Peters MD 2024 4:38 PM          
0845  Perfect serve message sent to Dr. Peters for BS >500.  Orders received    0853  PS sent to Dr. Peters for clarification on insulin orders, orders confirmed    1040  PS sent to Dr. Peters for recheck BS and possible change in diet.  Orders received    1630  PS sent to Dr. Peters updating him on POC BS and recheck orders received   
4 Eyes Skin Assessment     NAME:  Michelle Nettles  YOB: 1985  MEDICAL RECORD NUMBER:  [id number]    The patient is being assessed for  Admission    I agree that at least one RN has performed a thorough Head to Toe Skin Assessment on the patient. ALL assessment sites listed below have been assessed.      Areas assessed by both nurses:    Head, Face, Ears, Shoulders, Back, Chest, Arms, Elbows, Hands, Sacrum. Buttock, Coccyx, Ischium, and Legs. Feet and Heels        Does the Patient have a Wound? No noted wound(s)       Edgar Prevention initiated by RN: No  Wound Care Orders initiated by RN: No    Pressure Injury (Stage 3,4, Unstageable, DTI, NWPT, and Complex wounds) if present, place Wound referral order by RN under : No    New Ostomies, if present place, Ostomy referral order under : No     Nurse 1 eSignature: Electronically signed by Anne Vega RN on 8/1/24 at 2:43 PM EDT    Dale owusu. MD aware  **SHARE this note so that the co-signing nurse can place an eSignature**    Nurse 2 eSignature: Electronically signed by Alfonzo Morales RN on 8/1/24 at 2:46 PM EDT    
Comprehensive Nutrition Assessment    Type and Reason for Visit:  Initial, Consult (ONS/Poor Appetite/Unstable BGL's)    Nutrition Recommendations/Plan:   Continue Diet.    Will Modify Current ONS and monitor.       Malnutrition Assessment:  Malnutrition Status:  At risk for malnutrition (Comment) (08/06/24 1310)    Context:  Acute Illness     Findings of the 6 clinical characteristics of malnutrition:  Energy Intake:  No significant decrease in energy intake  Weight Loss:  Unable to assess (2/2 fluid shifts)     Body Fat Loss:  Unable to assess (2/2 FANG for HD at time of attempted eval)     Muscle Mass Loss:  Unable to assess    Fluid Accumulation:  Unable to assess (2/2 ESRD)     Strength:  Not Performed    Nutrition Assessment:    Pt adm w/ SOB s/p missed HD sessions pta.  PMHx CHF, DM, HTN, ESRD/HD, CRF, dietary non-compliance, anemia, hx hemorrhoids.  Adm w/ hypervolemia, hyperkalemia, GIB, B/L pleural effusions, chronic hypoxic respiratory insufficiency d/t volume overload/pulmonary vascular congestion, MRSA and GIB/hematochezia.  Noted Rt facial abscess s/p I&D 8/2.  BSE WNL 8/2.  At risk d/t increased needs for wound healing w/ known HD losses.  Will Modify Current ONS and monitor.    Nutrition Related Findings:    A&O, dentition WNL, Abd/BS WDL, +1/2 edema, -I/O's, elevated BUN/Cr/BGL, noted BGL's + since adm, A1c 6% on 6/6 last draw Wound Type: Surgical Incision, Wound Consult Pending       Current Nutrition Intake & Therapies:    Average Meal Intake: %  Average Supplements Intake: Unable to assess  ADULT DIET; Regular; 4 carb choices (60 gm/meal); Low Potassium (Less than 3000 mg/day)  ADULT ORAL NUTRITION SUPPLEMENT; Breakfast, Lunch, Dinner; Diabetic Oral Supplement    Anthropometric Measures:  Height: 149.9 cm (4' 11.02\")  Ideal Body Weight (IBW): 95 lbs (43 kg)    Admission Body Weight: 63.5 kg (140 lb) (bed 8/3)  Current Body Weight: 63.5 kg (140 lb) (actual 8/5),   IBW. Weight 
Coordination of care discussion and chart review with PM team. LSW met with pt to follow up on adavcne directives. She states she started to look at them but fell asleep. Pt plans to review them on her own. Supportive listening provided as she discussed her plans for returning home. She has a friends mom who helps her and is trying to see if she can be a paid caregiver. She states this lady is trying to work this out through an agency. Pt appreciative of visit.  
Discharge order noted. Bedside RN Valerie spoke to Dr. Peters, who said that pt can discharge tomorrow morning if am labs are stable.      
Discharge order placed yesterday afternoon. Perfectserve sent to Dr. Salcido to verify that pt is to be discharged today.  Electronically signed by Walker Busch RN on 8/7/2024 at 7:37 AM    
Discussed with patient at bedside about bloody bowel movement.  Patient said that she had 1 episode of large bright red bloody bowel movement the night prior, says she personally did not see it but the nurse told her this was the case.  I informed both the patient and the nurse to page me if there were any more bloody bowel movements.  Hemoglobin remains stable at 7.2.      Electronically signed by George Anderson DO on 8/4/2024 at 12:23 AM   
GENERAL SURGERY  DAILY PROGRESS NOTE  8/3/2024    Subjective:  No new complaints or overnight events. Pain in face has largely resolved. Still complains of pain along borders of incision but no new drainage.    Objective:  BP (!) 122/94   Pulse 98   Temp 97.8 °F (36.6 °C) (Temporal)   Resp 18   SpO2 97%     - General: No acute distress. Awake and conversant.  - Head: R face abscess s/p I and D, packing in place, some continued fluctuance and tenderness in superior L and inferior R borders    - Eyes: Anicteric. Round symmetric pupils.  - CV: Regular rate by peripheral pulse. No cyanosis   - Respiratory: Respirations are non-labored   - Abdomen: Soft, non-tender, non-distended. No guarding or rebound tenderness  - Skin: Warm. No rashes or ulcers.  - MSK: No obvious deformities. No edema.      I have personally reviewed all relevant labs and imaging.    Assessment/Plan:  39 y.o. female with R facial abscess s/p I&D on 8/2/24   -Medical recommendations per primary  -Nephrology has been consulted and following for inpatient hemodialysis needs  -Cardiology and pulmonology following  -Multimodal pain management (oxy prn/ tylenol)  - Abscess does not need to repacked because size of incision is enough to promote adequate drainage      Electronically signed by George Anderson DO on 8/3/2024 at 9:52 AM     Abscess appears to be fully drained  C/w cleaning with saline daily and covering with dry dressing.    Daphnie Cedillo MD, MSc, FACS  8/3/2024  5:22 PM    
GENERAL SURGERY  DAILY PROGRESS NOTE  8/4/2024    Subjective:  No new complaints or overnight events. Pain in face is slightly more today then it was yesterday per pt. Has not had any bowel movement today to ascertain if there is blood in her stools. H/H from today improved from 7.2 to 8.0.    Objective:  BP (!) 120/50   Pulse (!) 102   Temp 97.9 °F (36.6 °C) (Temporal)   Resp 18   Wt 63.8 kg (140 lb 10.5 oz)   SpO2 100%   BMI 28.41 kg/m²     - General: No acute distress. Awake and conversant.  - Head: R face abscess s/p I and D,  wound was oozing today. Healing well.    - Eyes: Anicteric. Round symmetric pupils.  - CV: Regular rate by peripheral pulse. No cyanosis   - Respiratory: Respirations are non-labored   - Abdomen: Soft, non-tender, non-distended. No guarding or rebound tenderness  - Skin: Warm. No rashes or ulcers.  - MSK: No obvious deformities. No edema.      I have personally reviewed all relevant labs and imaging.    Assessment/Plan:  39 y.o. female with R facial abscess s/p I&D on 8/2/24   -Continue to monitor H/H and monitor pt for signs of bleeding, transfuse as necessary  -Medical recommendations per primary  -Nephrology has been consulted and following for inpatient hemodialysis needs  -Cardiology and pulmonology following  -Multimodal pain management (oxy prn/ tylenol)  - Abscess cleaned with saline daily covered with dry dressing      Electronically signed by George Anderson DO on 8/4/2024 at 7:53 AM       
General Surgery   Daily Progress Note      Patient's Name/Date of Birth: Michelle Nettles / 1985    Date: August 5, 2024     Chief Complaint: shortness of breath    Patient Active Problem List   Diagnosis    Poorly controlled type 1 diabetes mellitus (HCC)    Primary hypertension    Facial cellulitis    Acute hyperkalemia    Nasal polyp    Periorbital cellulitis of right eye    Periorbital cellulitis    Vitamin B deficiency, unspecified    Nasal congestion    Hidradenitis suppurativa    Symptomatic anemia    Normocytic anemia due to blood loss    Acute decompensated heart failure (HCC)    Elevated troponin    Gall stone    Dietary noncompliance    Metabolic encephalopathy    Hypoxia    Hypotension    History of venous thromboembolism    Chronic systolic (congestive) heart failure    MRSA colonization    ESRD on hemodialysis (HCC)    SOB (shortness of breath)    Chronic hypoxic respiratory failure, on home oxygen therapy (HCC)    Acute on chronic anemia    Hyperglycemia due to diabetes mellitus (HCC)    Hypercalcemia    GI bleed    Hypoglycemia    Hypervolemia       Subjective: Patient feels well this AM. Reports having dark BM overnight. Denies abdominal pain. Denies nausea and vomiting.    Objective:  BP (!) 155/133   Pulse 92   Temp 98.9 °F (37.2 °C) (Oral)   Resp 13   Wt 63.8 kg (140 lb 10.5 oz)   SpO2 100%   BMI 28.41 kg/m²   Labs:  Recent Labs     08/03/24  0700 08/04/24  0528 08/05/24  0432   WBC 8.6 4.0* 4.7   HGB 7.2* 8.0* 7.2*   HCT 21.6* 24.6* 22.1*     Recent Labs     08/02/24  1957 08/03/24  0700 08/04/24  1339    131* 135   K 3.5 3.7 3.3*   CL 92* 91* 95*   CO2 29 25 29   BUN 24* 32* 27*   CREATININE 3.0* 4.2* 4.7*     No results for input(s): \"ALKPHOS\", \"ALT\", \"AST\", \"BILITOT\", \"BILIDIR\", \"LABALBU\", \"LIPASE\" in the last 72 hours.      General appearance: NAD  Head: NCAT, PERRL, EOMI, pink conjunctiva  Neck: supple, no masses  Lungs: symmetric chest rise, no audible 
General Surgery   Daily Progress Note      Patient's Name/Date of Birth: Michelle Nettles / 1985    Date: August 6, 2024     Chief Complaint: shortness of breath    Patient Active Problem List   Diagnosis    Poorly controlled type 1 diabetes mellitus (HCC)    Primary hypertension    Facial cellulitis    Acute hyperkalemia    Nasal polyp    Periorbital cellulitis of right eye    Periorbital cellulitis    Vitamin B deficiency, unspecified    Nasal congestion    Hidradenitis suppurativa    Symptomatic anemia    Normocytic anemia due to blood loss    Acute decompensated heart failure (HCC)    Elevated troponin    Gall stone    Dietary noncompliance    Metabolic encephalopathy    Hypoxia    Hypotension    History of venous thromboembolism    Chronic systolic (congestive) heart failure    MRSA colonization    ESRD on hemodialysis (HCC)    SOB (shortness of breath)    Chronic hypoxic respiratory failure, on home oxygen therapy (HCC)    Acute on chronic anemia    Hyperglycemia due to diabetes mellitus (HCC)    Hypercalcemia    GI bleed    Hypoglycemia    Hypervolemia       Subjective: No issues overnight. Had a dark BM smeared with bright red blood.     Objective:  /67   Pulse 87   Temp 97.3 °F (36.3 °C) (Temporal)   Resp 18   Ht 1.499 m (4' 11.02\")   Wt 68.7 kg (151 lb 7.3 oz)   SpO2 98%   BMI 30.57 kg/m²   Labs:  Recent Labs     08/03/24  0700 08/04/24  0528 08/05/24  0432   WBC 8.6 4.0* 4.7   HGB 7.2* 8.0* 7.2*   HCT 21.6* 24.6* 22.1*       Recent Labs     08/03/24  0700 08/04/24  1339 08/05/24  0432   * 135 133   K 3.7 3.3* 4.5   CL 91* 95* 94*   CO2 25 29 27   BUN 32* 27* 36*   CREATININE 4.2* 4.7* 5.9*       No results for input(s): \"ALKPHOS\", \"ALT\", \"AST\", \"BILITOT\", \"BILIDIR\", \"LABALBU\", \"LIPASE\" in the last 72 hours.      General appearance: NAD  Head: NCAT, PERRL, EOMI, pink conjunctiva  Neck: supple, no masses  Lungs: symmetric chest rise, no audible wheezes  Heart: warm 
General surgery consult sent to Dr. Cedillo via perfect serve    Electronically signed by Albina Gonzalez RN on 8/2/2024 at 11:31 AM    
He is to continue with all current medical management and CODE STATUS is a full code.  She has paperwork for advanced directives if she wishes to complete. There are no further PM needs at this time.  PM will now sign off.  If new PM needs arise, please re-consult.  Thank you.   
Occupational Therapy    OCCUPATIONAL THERAPY INITIAL EVALUATION    [facility]  [address], [place]        Date:2024                                                  Patient Name: Michelle Nettles    MRN: [id number]    : 1985    Room: [address] [place] [address]          Evaluating OT: Trinity Berrios, ALISOND, OTR/L; [id number]      Referring Provider: Trenton Peters MD     Specific Provider Orders/Date: OT Eval and Treat 2024      Diagnosis:    1. Hypervolemia, unspecified hypervolemia type    2. Hyperkalemia       Patient Active Problem List   Diagnosis    Poorly controlled type 1 diabetes mellitus (HCC)    Primary hypertension    Facial cellulitis    Acute hyperkalemia    Nasal polyp    Periorbital cellulitis of right eye    Periorbital cellulitis    Vitamin B deficiency, unspecified    Nasal congestion    Hidradenitis suppurativa    Symptomatic anemia    Normocytic anemia due to blood loss    Acute decompensated heart failure ([facility])    Elevated troponin    Gall stone    Dietary noncompliance    Metabolic encephalopathy    Hypoxia    Hypotension    History of venous thromboembolism    Chronic systolic (congestive) heart failure    MRSA colonization    ESRD on hemodialysis ([facility])    SOB (shortness of breath)    Chronic hypoxic respiratory failure, on home oxygen therapy ([facility])    Acute on chronic anemia    Hyperglycemia due to diabetes mellitus ([facility])    Hypercalcemia    GI bleed    Hypoglycemia    Hypervolemia        Pertinent Medical History:   Past Medical History:   Diagnosis Date    JOLLY (acute kidney injury) ([facility]) 2016    AVF (arteriovenous fistula) ([facility]) 2018    let arm    Chronic kidney disease     Dialysis AV fistula malfunction, initial encounter ([facility]) 2019    DM type 1 (diabetes mellitus, type 1) ([facility])     Encounter regarding vascular access for dialysis for ESRD ([facility]) 2018    ESRD (end stage renal disease) ([facility]) 
PS sent for cardiology and pulmonology   
PS sent to Dr. William for new consult  
Patient had bowel movement with bright red blood and blood clots present.  Notified Dr. Milanes Marino.  New orders placed.  
Patient is refusing bipap states that she feels better after receiving dialysis.   
[facility]  Will change consult.  
Patient states she does not feel good and asked this RN to check her BS.   POCT glucose 53.  Patient drank 2 apple juice and 1 cranberry juice. Offered patient Glucose tabs for low BS or OJ. Patient states she can't have anything citrus. This RN offered patient sharon crackers and patient states \"those wont work\"  BS rechecked after drinking juice.  POCT glucose 56  Glucagon 1mg given to patient per PRN orders due to no IV access.  BS 96 15 mins after glucagon inj. Patient states she feels much better. She is eating snacks at this time also.      Electronically signed by Priscilla Jaramillo RN on 8/7/24 at 1:54 PM EDT    
Perfect serve message sent to Dr. Anderson for large bloody BM last night  
Physical Therapy    Physical Therapy Initial Assessment     Name: Michelle Nettles  : 1985  MRN: [id number]      Date of Service: 2024    Evaluating PT:  Mandy Queevdo, PT, DPT [id number]    Room #:  4502/4502-A  Diagnosis:  Hyperkalemia [E87.5]  Acute hyperkalemia [E87.5]  Hypervolemia, unspecified hypervolemia type [E87.70]  PMHx/PSHx:   has a past medical history of JOLLY (acute kidney injury) ([facility]), AVF (arteriovenous fistula) ([facility]), Chronic kidney disease, Dialysis AV fistula malfunction, initial encounter ([facility]), DM type 1 (diabetes mellitus, type 1) ([facility]), Encounter regarding vascular access for dialysis for ESRD ([facility]), ESRD (end stage renal disease) ([facility]), Hemodialysis patient ([facility]), Hypertension, and Tobacco abuse.  Procedures/surgeries: I & D of facial abscess 24  Precautions:  Fall risk, alarms, O2, monitor BP, HD (, , )  Equipment Needs:  TBD    SUBJECTIVE:    Pt lives with her 11 y.o. dtr (who has CP) in a 1 story home with a level entry. Pt ambulated with no AD PTA. Pt reports she typically is on 5L/min O2 at baseline.    OBJECTIVE:   Initial Evaluation  Date: 24 Treatment Date:  Short Term/ Long Term   Goals   AM-PAC 6 Clicks 15/24     Was pt agreeable to Eval/treatment? Yes     Does pt have pain?  7/10 post-op pain of R cheek     Bed Mobility  Rolling: NT  Supine to sit: SBA  Sit to supine: SBA  Scooting: SBA  Rolling: Independent  Supine to sit: Independent  Sit to supine: Independent  Scooting: Independent   Transfers Sit to stand: Dariela  Stand to sit: Dariela  Stand pivot: Dariela with no AD  Sit to stand: Independent  Stand to sit: Independent  Stand pivot: Mod I with AAD   Ambulation    Few feet with no AD and Dariela  >100 feet with AAD and Mod I   Stair negotiation: ascended and descended  NT  TBD   ROM BUE:  Refer to OT  BLE:  WFL     Strength BUE:  Refer to OT  BLE:  Grossly 4/5     Balance Sitting EOB:  SBA  Dynamic Standing:  Dariela with no AD  Sitting EOB:  
Pt informed of d/c order. Pt states she will not be able to go until around 1230       
Pt requesting meds be sent to [address], [place] [address]    Dr. Salcido notified via [employer]. Electronically signed by Walker Busch RN on 8/7/2024 at 11:28 AM    
Pt's occult blood stool test resulted as positive  
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      DIET RECOMMENDATIONS:  Regular consistency solids (IDDSI level 7) with  thin liquids (IDDSI level 0)     FEEDING RECOMMENDATIONS:     Assistance level:  No assistance needed      Compensatory strategies recommended: No strategies are recommended at this time      Discussed recommendations with:   floor nurse (patient nurse and charge nurse unavailable)    SPEECH THERAPY  PLAN OF CARE   The dysphagia POC is established based on physician order, dysphagia diagnosis and results of clinical assessment     Dysphagia therapy is not recommended     Conditions Requiring Skilled Therapeutic Intervention for dysphagia:    not applicable    Specific dysphagia interventions to include:     Not applicable    Specific instructions for next treatment:  not applicable   Patient Treatment Goals:    Short Term Goals:  Not applicable no therapy warranted     Long Term Goals:   Not applicable no therapy warranted      Patient/family Goal:    not applicable                    ADMITTING DIAGNOSIS: Hyperkalemia [E87.5]  Acute hyperkalemia [E87.5]  Hypervolemia, unspecified hypervolemia type [E87.70]    VISIT DIAGNOSIS:   Visit Diagnoses         Codes    Hyperkalemia     E87.5             PATIENT REPORT/COMPLAINT: denies difficulty swallowing  nursing not available at time of evaluation chart reviewed and patient is not NPO for any procedures per current 
Spiritual Health Assessment/Progress Note  [facility]    Initial Encounter,  ,  ,      Name: Michelle Nettles MRN: [id number]    Age: 39 y.o.     Sex: female   Language: English   Taoism: Holiness   Acute hyperkalemia     Date: 8/6/2024                           Spiritual Assessment began in [address] PICU        Referral/Consult From: Nurse, Patient   Encounter Overview/Reason: Initial Encounter  Service Provided For: Patient    Jillian, Belief, Meaning:   Patient identifies as spiritual  Family/Friends No family/friends present      Importance and Influence:  Patient has spiritual/personal beliefs that influence decisions regarding their health  Family/Friends no family/friends present    Community:  Patient is connected with a spiritual community and feels well-supported. Support system includes: Children, [facility], and Friends  Family/Friends Other: No family present    Assessment and Plan of Care:     Patient Interventions include: Facilitated expression of thoughts and feelings, Explored spiritual coping/struggle/distress and theological reflection, Affirmed coping skills/support systems, and Engaged in life review and/or legacy  Family/Friends Interventions include: Other: No family present    Patient Plan of Care: Spiritual Care available upon further referral  Family/Friends Plan of Care: Other: No family present    Electronically signed by Chaplain Ricky on 8/6/2024 at 8:55 PM   
[facility]  Nephrology Progress Note    Patient's Name: Michelle Nettles    History of Present Illness from 8/1 Consult Note:    \"Michelle Nettles is a 39 y.o. female with a past medical history of T1DM, ESRD, hypertension.  She presented to the ED on 7/21 for concerns of shortness of breath.  Vital signs on 7/31 includes temperature 97.8, respirations 26, pulse 111, /71, she was 96% SpO2. Lab data on 7/31 includes potassium 6.7 (hemolyzed), CO2 20, BUN 70, creatinine 12.3, anion gap 17, glucose 56, and hemoglobin 8.2.  She had a chest x-ray on 7/31 which showed cardiomegaly with mild/moderate bilateral perihilar and interstitial pulmonary edema.  Nephrology has been consulted to see the patient for ESRD on HD.  Patient is known to our service and dialyzes as an outpatient at [facility] via left arm AV fistula.  Her last outpatient HD was reportedly 7/25.  At present, patient was seen and examined.  She notes that she had shortness of breath which was progressively getting worse.  She denies any chest pain.  She denies any fevers.  She denies any cough or sore throat.\"    Subjective:     8/2: Patient was seen and examined on HD.  She reports that she feels okay and she notes that her breathing is better.  She denies any chest pain or shortness of breath.  She denies any nausea or vomiting.    8/3: pt seen and examined, sp hd this am with 2.7 L off. Sp I and D of facial abscess    PMH:    Past Medical History:   Diagnosis Date    JOLLY (acute kidney injury) ([facility]) 4/5/2016    AVF (arteriovenous fistula) ([facility]) 02/19/2018    let arm    Chronic kidney disease     Dialysis AV fistula malfunction, initial encounter ([facility]) 11/7/2019    DM type 1 (diabetes mellitus, type 1) ([facility])     Encounter regarding vascular access for dialysis for ESRD ([facility]) 07/12/2018    ESRD (end stage renal disease) ([facility])     Hemodialysis patient ([facility])     amrita dawson mon wed fri / graft in left arm    
[facility]  Nephrology Progress Note    Patient's Name: Michelle Nettles    History of Present Illness from 8/1 Consult Note:    \"Michelle Nettles is a 39 y.o. female with a past medical history of T1DM, ESRD, hypertension.  She presented to the ED on 7/21 for concerns of shortness of breath.  Vital signs on 7/31 includes temperature 97.8, respirations 26, pulse 111, /71, she was 96% SpO2. Lab data on 7/31 includes potassium 6.7 (hemolyzed), CO2 20, BUN 70, creatinine 12.3, anion gap 17, glucose 56, and hemoglobin 8.2.  She had a chest x-ray on 7/31 which showed cardiomegaly with mild/moderate bilateral perihilar and interstitial pulmonary edema.  Nephrology has been consulted to see the patient for ESRD on HD.  Patient is known to our service and dialyzes as an outpatient at [facility] via left arm AV fistula.  Her last outpatient HD was reportedly 7/25.  At present, patient was seen and examined.  She notes that she had shortness of breath which was progressively getting worse.  She denies any chest pain.  She denies any fevers.  She denies any cough or sore throat.\"    Subjective:     8/2: Patient was seen and examined on HD.  She reports that she feels okay and she notes that her breathing is better.  She denies any chest pain or shortness of breath.  She denies any nausea or vomiting.    PMH:    Past Medical History:   Diagnosis Date    JOLLY (acute kidney injury) ([facility]) 4/5/2016    AVF (arteriovenous fistula) ([facility]) 02/19/2018    let arm    Chronic kidney disease     Dialysis AV fistula malfunction, initial encounter ([facility]) 11/7/2019    DM type 1 (diabetes mellitus, type 1) ([facility])     Encounter regarding vascular access for dialysis for ESRD ([facility]) 07/12/2018    ESRD (end stage renal disease) ([facility])     Hemodialysis patient ([facility])     amrita eunice mon wed fri / graft in left arm    Hypertension     Tobacco abuse 4/5/2016       Past Surgical History:   Procedure Laterality Date    
[facility]  Nephrology Progress Note    Patient's Name: Michelle Nettles    History of Present Illness from 8/1 Consult Note:    \"Michelle Nettles is a 39 y.o. female with a past medical history of T1DM, ESRD, hypertension.  She presented to the ED on 7/21 for concerns of shortness of breath.  Vital signs on 7/31 includes temperature 97.8, respirations 26, pulse 111, /71, she was 96% SpO2. Lab data on 7/31 includes potassium 6.7 (hemolyzed), CO2 20, BUN 70, creatinine 12.3, anion gap 17, glucose 56, and hemoglobin 8.2.  She had a chest x-ray on 7/31 which showed cardiomegaly with mild/moderate bilateral perihilar and interstitial pulmonary edema.  Nephrology has been consulted to see the patient for ESRD on HD.  Patient is known to our service and dialyzes as an outpatient at [facility] via left arm AV fistula.  Her last outpatient HD was reportedly 7/25.  At present, patient was seen and examined.  She notes that she had shortness of breath which was progressively getting worse.  She denies any chest pain.  She denies any fevers.  She denies any cough or sore throat.\"    Subjective:     8/2: Patient was seen and examined on HD.  She reports that she feels okay and she notes that her breathing is better.  She denies any chest pain or shortness of breath.  She denies any nausea or vomiting.    8/3: pt seen and examined, sp hd this am with 2.7 L off. Sp I and D of facial abscess    8/4: pt seen and examined, no cp or sob today, no abd pain, had BRBPR     PMH:    Past Medical History:   Diagnosis Date    JOLLY (acute kidney injury) ([facility]) 4/5/2016    AVF (arteriovenous fistula) ([facility]) 02/19/2018    let arm    Chronic kidney disease     Dialysis AV fistula malfunction, initial encounter ([facility]) 11/7/2019    DM type 1 (diabetes mellitus, type 1) ([facility])     Encounter regarding vascular access for dialysis for ESRD ([facility]) 07/12/2018    ESRD (end stage renal disease) ([facility])     Hemodialysis 
This nurse called to pt's room, asked by pt to check BS, pt noticeably lethargic and diaphoretic    POC BS <40, 10% dextrose 250 mg administered    POC BS recheck 135, pt states \"I feel much better\"  
Told in report that patient refused NIV for hs.  
[facility]  PROGRESS NOTE  ATTENDING NOTE    S/p I&D facial abscess--continue to clean daily with saline and cover with dry dressing.    Rectal bleeding--colonoscopy last month with Dr. Gonzalez.  Hemorrhoids was the only thing that was found.  Patient states she has a little pain with defecation. Will start Anusol.  Continue to monitor daily H/H    Daphnie Cedillo MD, MSc, FACS  8/4/2024  10:17 AM    
Wound care has been perfect served  *Wound on the right face cheek of pt  
  HGB 7.2* 7.2* 8.0*    229 250     BMP:    Recent Labs     08/05/24  0432 08/06/24  0815 08/07/24  0349    132 139   K 4.5 5.0 4.2   CL 94* 94* 99   CO2 27 26 28   BUN 36* 53* 27*   CREATININE 5.9* 8.0* 4.4*   GLUCOSE 224* 196* 134*     Hepatic: No results for input(s): \"AST\", \"ALT\", \"BILITOT\", \"ALKPHOS\" in the last 72 hours.    Invalid input(s): \"ALB\"  Troponin: No results for input(s): \"TROPONINI\" in the last 72 hours.  BNP: No results for input(s): \"BNP\" in the last 72 hours.  Lipids: No results for input(s): \"CHOL\", \"HDL\" in the last 72 hours.    Invalid input(s): \"LDLCALCU\"  ABGs: No results found for: \"PHART\", \"PO2ART\", \"YQP2UYX\"  INR: No results for input(s): \"INR\" in the last 72 hours.    -----------------------------------------------------------------  RAD: Echo (TTE) complete (PRN contrast/bubble/strain/3D)    Result Date: 8/1/2024    Left Ventricle: Normal left ventricular systolic function with a visually estimated EF of 60 - 65%. Left ventricle size is normal. Findings consistent with moderate concentric hypertrophy. Normal wall motion. Global longitudinal strain is reduced with a value of -17.3%. Indeterminate diastolic function.   Right Ventricle: Right ventricle size is normal. Normal systolic function.   Aortic Valve: Mild sclerosis of the aortic valve cusps.   Tricuspid Valve: Unable to assess RVSP. Est RA pressure is 3 mmHg.   Left Atrium: Left atrium size is normal.   Right Atrium: Right atrium size is normal.   Image quality is good.     XR CHEST PORTABLE    Result Date: 8/1/2024  EXAMINATION: ONE XRAY VIEW OF THE CHEST 8/1/2024 7:15 am COMPARISON: 07/31/2024 HISTORY: ORDERING SYSTEM PROVIDED HISTORY: SOB TECHNOLOGIST PROVIDED HISTORY: Reason for exam:->SOB FINDINGS: Portable chest reveals heart to be enlarged.  Patchy airspace disease seen within the lower lung fields bilaterally with small bilateral pleural effusions.  The effusion on the right is slightly increased in size in 
\"AST\", \"ALT\", \"BILITOT\", \"ALKPHOS\" in the last 72 hours.    Invalid input(s): \"ALB\"  Troponin: No results for input(s): \"TROPONINI\" in the last 72 hours.  BNP: No results for input(s): \"BNP\" in the last 72 hours.  Lipids: No results for input(s): \"CHOL\", \"HDL\" in the last 72 hours.    Invalid input(s): \"LDLCALCU\"  ABGs: No results found for: \"PHART\", \"PO2ART\", \"PYA5TXN\"  INR: No results for input(s): \"INR\" in the last 72 hours.    -----------------------------------------------------------------  RAD: Echo (TTE) complete (PRN contrast/bubble/strain/3D)    Result Date: 8/1/2024    Left Ventricle: Normal left ventricular systolic function with a visually estimated EF of 60 - 65%. Left ventricle size is normal. Findings consistent with moderate concentric hypertrophy. Normal wall motion. Global longitudinal strain is reduced with a value of -17.3%. Indeterminate diastolic function.   Right Ventricle: Right ventricle size is normal. Normal systolic function.   Aortic Valve: Mild sclerosis of the aortic valve cusps.   Tricuspid Valve: Unable to assess RVSP. Est RA pressure is 3 mmHg.   Left Atrium: Left atrium size is normal.   Right Atrium: Right atrium size is normal.   Image quality is good.     XR CHEST PORTABLE    Result Date: 8/1/2024  EXAMINATION: ONE XRAY VIEW OF THE CHEST 8/1/2024 7:15 am COMPARISON: 07/31/2024 HISTORY: ORDERING SYSTEM PROVIDED HISTORY: SOB TECHNOLOGIST PROVIDED HISTORY: Reason for exam:->SOB FINDINGS: Portable chest reveals heart to be enlarged.  Patchy airspace disease seen within the lower lung fields bilaterally with small bilateral pleural effusions.  The effusion on the right is slightly increased in size in the interval.  Bony structures are unremarkable.  Increased interstitial markings again seen throughout the lung fields.     Cardiomegaly with patchy airspace disease seen within the lower lung fields bilaterally with small bilateral pleural effusions. The effusion on the right is 
  Diagnosis    Poorly controlled type 1 diabetes mellitus (HCC)    Primary hypertension    Facial cellulitis    Acute hyperkalemia    Nasal polyp    Periorbital cellulitis of right eye    Periorbital cellulitis    Vitamin B deficiency, unspecified    Nasal congestion    Hidradenitis suppurativa    Symptomatic anemia    Normocytic anemia due to blood loss    Acute decompensated heart failure ([facility])    Elevated troponin    Gall stone    Dietary noncompliance    Metabolic encephalopathy    Hypoxia    Hypotension    History of venous thromboembolism    Chronic systolic (congestive) heart failure    MRSA colonization    ESRD on hemodialysis ([facility])    SOB (shortness of breath)    Chronic hypoxic respiratory failure, on home oxygen therapy ([facility])    Acute on chronic anemia    Hyperglycemia due to diabetes mellitus ([facility])    Hypercalcemia    GI bleed    Hypoglycemia    Hypervolemia       Diet:    ADULT DIET; Regular; 4 carb choices (60 gm/meal); Low Potassium (Less than 3000 mg/day)  ADULT ORAL NUTRITION SUPPLEMENT; Breakfast, Lunch, Dinner; Diabetic Oral Supplement    Meds:     pantoprazole  40 mg Oral BID AC    polyethylene glycol  17 g Oral Daily    sennosides-docusate sodium  2 tablet Oral Daily    doxycycline hyclate  100 mg Oral 2 times per day    insulin glargine  20 Units SubCUTAneous Daily    hydrocortisone   Rectal BID    insulin lispro  10 Units SubCUTAneous TID WC    calcium acetate  4 capsule Oral TID WC    carvedilol  25 mg Oral Dinner    clopidogrel  75 mg Oral Daily    vitamin B-12  500 mcg Oral Daily    sodium chloride flush  5-40 mL IntraVENous 2 times per day    insulin lispro  0-4 Units SubCUTAneous TID WC    insulin lispro  0-4 Units SubCUTAneous Nightly        dextrose      sodium chloride      sodium chloride      sodium chloride         Meds prn:     ipratropium 0.5 mg-albuterol 2.5 mg, potassium chloride **OR** potassium alternative oral replacement, glucose, dextrose bolus **OR** dextrose bolus, 
Blocker,  [] Carafate,  [] Diet/Tube Feeds   Disposition Patient requires continued admission due to pending nephrology, cardiology and pulmonology eval and recommendations   MDM [] Low, [x] Moderate,[]  High       Medications:  REVIEWED DAILY    Infusion Medications    sodium chloride      sodium chloride       Scheduled Medications    calcium acetate  4 capsule Oral TID WC    carvedilol  25 mg Oral Dinner    clopidogrel  75 mg Oral Daily    vitamin B-12  500 mcg Oral Daily    pantoprazole  40 mg Oral QAM AC    sodium chloride flush  5-40 mL IntraVENous 2 times per day    insulin lispro  0-4 Units SubCUTAneous TID WC    insulin lispro  0-4 Units SubCUTAneous Nightly    insulin glargine  10 Units SubCUTAneous Daily    insulin lispro  4 Units SubCUTAneous TID WC     PRN Meds: albuterol, sodium chloride flush, sodium chloride, ondansetron **OR** ondansetron, polyethylene glycol, acetaminophen **OR** acetaminophen, sodium chloride    Labs:     Recent Labs     07/31/24 2100 08/01/24  0754   WBC 6.9 7.6   HGB 8.2* 6.2*   HCT 24.3* 19.1*    216       Recent Labs     07/31/24 2100 08/01/24  0018 08/01/24  0547 08/01/24  0754     --  135 133   K 6.7* 5.04* 6.5* 5.4*   CL 99  --  95* 97*   CO2 20*  --  15* 18*   BUN 70*  --  75* 77*   CREATININE 12.3*  --  13.4* 13.5*   CALCIUM 8.7  --  8.4* 8.4*       Recent Labs     07/31/24 2100   ALKPHOS 90   ALT 24   AST 61*   BILITOT 0.4       No results for input(s): \"INR\" in the last 72 hours.    No results for input(s): \"CKTOTAL\", \"TROPONINI\" in the last 72 hours.    Chronic labs:    Lab Results   Component Value Date    CHOL 155 10/08/2013    TRIG 68 10/08/2013    HDL 40 01/29/2021    TSH 2.900 03/24/2023    INR 1.0 01/30/2024    LABA1C 6.0 (H) 06/06/2024       Radiology: REVIEWED DAILY    +++++++++++++++++++++++++++++++++++++++++++++++++  Trenton Peters MD  [facility], 
Conjunctivae/corneas clear.  Right facial abscess s/p I&D, with dressing  Neck: Supple. No jugular venous distention.   Respiratory: symmetrical; clear to auscultation bilaterally; no wheezes; no rhonchi; no rales  Cardiovascular: rhythm regular; rate controlled; no murmurs  Abdomen: Soft, nontender, nondistended  Extremities:  peripheral pulses present; 2+ peripheral edema; no ulcers  Musculoskeletal: No clubbing, cyanosis, 2+ bilateral lower extremity edema. Brisk capillary refill.   Skin:  No rashes  on visible skin  Neurologic: awake, alert and following commands     ASSESSMENT and PLAN:    Assessment    Acute on chronic hypoxic respiratory failure  Anasarca  Volume overload  ESRD on hemodialysis  Acute on chronic anemia  HFpEF  Poorly controlled type 1 diabetes mellitus  History of venous thromboembolism  Hyperkalemia  Right facial abscess  GI bleed    Plan    -Monitor on telemetry  -Nephrology has been consulted and following for inpatient hemodialysis needs  -1 unit PRBC 8/2 for hemoglobin 6.1, monitor H&H, transfuse as indicated, keep hemoglobin more than 7 g/dL  -Monitor input/output  -PT/OT/SLP eval when appropriate  -Cardiology and pulmonology following, appreciate recommendations  -On insulin glargine and lispro sliding scale, Accu-Cheks, hypoglycemia protocol, adjust as needed  -Palliative care consulted for goals of care  -Resume home medication as appropriate  -Optimize electrolytes  -General surgery following for right facial abscess, s/p I&D, local wound care with saline and cover with dry dressing.  -General surgery following GI bleed, appreciate recommendations, started on Anusol.  - PPI twice daily  -Discussed with general surgery, restarted Plavix, recommendations from general surgery to observe for 24 hours for GI bleed.    DVT Prophylaxis [] Lovenox, []  Heparin, [x] SCDs, [] Ambulation   GI Prophylaxis [x] PPI,  [] H2 Blocker,  [] Carafate,  [] Diet/Tube Feeds   Disposition Patient requires 
  BILITOT 0.4       No results for input(s): \"INR\" in the last 72 hours.    No results for input(s): \"CKTOTAL\", \"TROPONINI\" in the last 72 hours.    Chronic labs:    Lab Results   Component Value Date    CHOL 155 10/08/2013    TRIG 68 10/08/2013    HDL 40 01/29/2021    TSH 2.69 08/01/2024    INR 1.0 01/30/2024    LABA1C 6.0 (H) 06/06/2024       Radiology: REVIEWED DAILY    +++++++++++++++++++++++++++++++++++++++++++++++++  Trenton Peters MD  [facility] [place], [place]  +++++++++++++++++++++++++++++++++++++++++++++++++  NOTE: This report was transcribed using voice recognition software. Every effort was made to ensure accuracy; however, inadvertent computerized transcription errors may be present.     
restarted Plavix, recommendations from general surgery to observe for 24 hours for GI bleed.    DVT Prophylaxis [] Lovenox, []  Heparin, [x] SCDs, [] Ambulation   GI Prophylaxis [x] PPI,  [] H2 Blocker,  [] Carafate,  [] Diet/Tube Feeds   Disposition Patient requires continued admission due to pending 24-hour observations after restarting Plavix for GI bleed.   MDM [] Low, [x] Moderate,[]  High       Medications:  REVIEWED DAILY    Infusion Medications    dextrose      sodium chloride      sodium chloride      sodium chloride       Scheduled Medications    mupirocin   Topical BID    neomycin-bacitracin-polymyxin   Topical Daily    pantoprazole  40 mg Oral BID AC    polyethylene glycol  17 g Oral Daily    sennosides-docusate sodium  2 tablet Oral Daily    doxycycline hyclate  100 mg Oral 2 times per day    insulin glargine  20 Units SubCUTAneous Daily    hydrocortisone   Rectal BID    insulin lispro  10 Units SubCUTAneous TID WC    calcium acetate  4 capsule Oral TID WC    carvedilol  25 mg Oral Dinner    clopidogrel  75 mg Oral Daily    vitamin B-12  500 mcg Oral Daily    sodium chloride flush  5-40 mL IntraVENous 2 times per day    insulin lispro  0-4 Units SubCUTAneous TID WC    insulin lispro  0-4 Units SubCUTAneous Nightly     PRN Meds: ipratropium 0.5 mg-albuterol 2.5 mg, potassium chloride **OR** potassium alternative oral replacement, glucose, dextrose bolus **OR** dextrose bolus, glucagon (rDNA), dextrose, sodium chloride, sodium chloride, oxyCODONE, albuterol, sodium chloride flush, sodium chloride, ondansetron **OR** ondansetron, acetaminophen **OR** acetaminophen, sodium chloride    Labs:     Recent Labs     08/04/24  0528 08/05/24  0432 08/06/24  0815   WBC 4.0* 4.7 5.7   HGB 8.0* 7.2* 7.2*   HCT 24.6* 22.1* 21.8*    213 229       Recent Labs     08/04/24  1339 08/05/24  0432 08/06/24  0815    133 132   K 3.3* 4.5 5.0   CL 95* 94* 94*   CO2 29 27 26   BUN 27* 36* 53*   CREATININE 4.7* 5.9*

## 2024-08-07 NOTE — DISCHARGE SUMMARY
Hospitalist Discharge Summary    Patient ID: Michelle Nettles   Patient : 1985  Patient's PCP: Rogers Rodríguez MD    Admit Date: 2024   Admitting Physician: Matthias Lamb MD    Discharge Date:  2024   Discharge Physician: Trenton Peters MD   Discharge Condition: Stable  Discharge Disposition: Home      Hospital course in brief:  (Please refer to daily progress notes for a comprehensive review of the hospitalization by requesting medical records)        This is a 39-year-old lady with past medical history of ESRD on hemodialysis, anemia, type 1 diabetes mellitus, hypertension, chronic respiratory failure with home oxygen, HFpEF who presented to [facility] ED on 2024 for evaluation of shortness of breath.  She was supposed to go to hemodialysis on 2024 however patient was too weak to show up and had increased shortness of breath despite on oxygen, patient was dyspneic on nasal cannula in the ED.  Patient feels that her arms and legs are getting more swollen.     2024 patient has been started on BiPAP.  Receiving hemodialysis.  Hemoglobin dropped down to 6.2.  Receiving 1 unit of packed red blood cells.  Nephrology, cardiology, pulmonology has been consulted.      patient states that her shortness of breath is improved after hemodialysis.  Seen at hemodialysis unit.  She was concerned about her facial abscess on the right.  General surgery has been consulted.     8/3 patient had large bloody bowel movement.  General surgery has been consulted.  She has received 2 units of PRBC so far.  S/p I&D of right facial abscess.  Breathing is much better      patient's blood glucose was elevated to more than 500, patient appears to have labile blood glucose.  Postop wound on face to be cleaned daily with saline and cover with dry dressing.  She underwent colonoscopy last month with Dr. Gonzalez.  Hemorrhoid was the only thing that was found.  Patient does have little 
Biopsied. Recommendation:        -  Await pathology results.        - Call my office at [phone] to schedule a follow up with Dr. Gonzalez if           needed        -  Use Protonix (pantoprazole) 40 mg PO daily. Procedure Code(s):        - 79013, Esophagogastroduodenoscopy, flexible, transoral; with biopsy, single           or multiple Diagnosis Code(s):        - K92.2, Gastrointestinal hemorrhage, unspecified        - K29.00, Acute gastritis without bleeding        - K22.2, Esophageal obstruction       CPT(R) - 2023 copyright [employer]. All Rights Reserved.       The CPT codes, CCI edits and ICD codes generated are intended as suggestions       and were generated based on input data.  These codes are preliminary and upon        review may be revised to meet current compliance and payer requirements.       The provider is responsible for the final determination of appropriate codes,       and modifiers. Attending Participation:        -  I personally performed the entire procedure. Dr. Balaji Gonzalez This document has been electronically signed. Note Initiated:7/18/2024 Note Completed:7/18/2024 3:14 PM    XR CHEST PORTABLE    Result Date: 7/16/2024  EXAMINATION: ONE XRAY VIEW OF THE CHEST 7/16/2024 9:06 pm COMPARISON: /6/24 HISTORY: ORDERING SYSTEM PROVIDED HISTORY: sob TECHNOLOGIST PROVIDED HISTORY: Reason for exam:->sob FINDINGS: The lungs are without acute focal process.  There is no effusion or pneumothorax.  Stable heart size.  The osseous structures are without acute process.     No acute process.       Discharge Medications:      Medication List        START taking these medications      doxycycline hyclate 100 MG capsule  Commonly known as: VIBRAMYCIN  Take 1 capsule by mouth every 12 hours for 14 doses     hydrocortisone 2.5 % Crea rectal cream  Commonly known as: ANUSOL-HC  Place 1 Application rectally 2 times daily     neomycin-bacitracin-polymyxin 5-400-5000 ointment  Apply

## 2024-08-14 ENCOUNTER — OFFICE VISIT (OUTPATIENT)
Dept: PRIMARY CARE CLINIC | Age: 39
End: 2024-08-14
Payer: MEDICARE

## 2024-08-14 ENCOUNTER — TELEPHONE (OUTPATIENT)
Dept: SURGERY | Age: 39
End: 2024-08-14

## 2024-08-14 VITALS
TEMPERATURE: 98.1 F | OXYGEN SATURATION: 100 % | WEIGHT: 145 LBS | HEART RATE: 99 BPM | DIASTOLIC BLOOD PRESSURE: 66 MMHG | BODY MASS INDEX: 29.23 KG/M2 | SYSTOLIC BLOOD PRESSURE: 123 MMHG | RESPIRATION RATE: 16 BRPM | HEIGHT: 59 IN

## 2024-08-14 DIAGNOSIS — M75.01 ADHESIVE CAPSULITIS OF RIGHT SHOULDER: ICD-10-CM

## 2024-08-14 DIAGNOSIS — Z99.81 CHRONIC HYPOXIC RESPIRATORY FAILURE, ON HOME OXYGEN THERAPY (HCC): ICD-10-CM

## 2024-08-14 DIAGNOSIS — E10.65 POORLY CONTROLLED TYPE 1 DIABETES MELLITUS (HCC): Primary | ICD-10-CM

## 2024-08-14 DIAGNOSIS — J96.11 CHRONIC HYPOXIC RESPIRATORY FAILURE, ON HOME OXYGEN THERAPY (HCC): ICD-10-CM

## 2024-08-14 DIAGNOSIS — K08.89 PAIN, DENTAL: ICD-10-CM

## 2024-08-14 DIAGNOSIS — G89.29 CHRONIC RIGHT SHOULDER PAIN: ICD-10-CM

## 2024-08-14 DIAGNOSIS — M25.511 CHRONIC RIGHT SHOULDER PAIN: ICD-10-CM

## 2024-08-14 DIAGNOSIS — Z99.2 ESRD ON HEMODIALYSIS (HCC): ICD-10-CM

## 2024-08-14 DIAGNOSIS — N18.6 ESRD ON HEMODIALYSIS (HCC): ICD-10-CM

## 2024-08-14 PROCEDURE — 3078F DIAST BP <80 MM HG: CPT | Performed by: FAMILY MEDICINE

## 2024-08-14 PROCEDURE — 4004F PT TOBACCO SCREEN RCVD TLK: CPT | Performed by: FAMILY MEDICINE

## 2024-08-14 PROCEDURE — G8417 CALC BMI ABV UP PARAM F/U: HCPCS | Performed by: FAMILY MEDICINE

## 2024-08-14 PROCEDURE — 99214 OFFICE O/P EST MOD 30 MIN: CPT | Performed by: FAMILY MEDICINE

## 2024-08-14 PROCEDURE — 1111F DSCHRG MED/CURRENT MED MERGE: CPT | Performed by: FAMILY MEDICINE

## 2024-08-14 PROCEDURE — 2022F DILAT RTA XM EVC RTNOPTHY: CPT | Performed by: FAMILY MEDICINE

## 2024-08-14 PROCEDURE — G8427 DOCREV CUR MEDS BY ELIG CLIN: HCPCS | Performed by: FAMILY MEDICINE

## 2024-08-14 PROCEDURE — 3074F SYST BP LT 130 MM HG: CPT | Performed by: FAMILY MEDICINE

## 2024-08-14 PROCEDURE — G2211 COMPLEX E/M VISIT ADD ON: HCPCS | Performed by: FAMILY MEDICINE

## 2024-08-14 PROCEDURE — 3044F HG A1C LEVEL LT 7.0%: CPT | Performed by: FAMILY MEDICINE

## 2024-08-14 RX ORDER — AMOXICILLIN 500 MG/1
500 CAPSULE ORAL 2 TIMES DAILY
Qty: 28 CAPSULE | Refills: 0 | Status: SHIPPED | OUTPATIENT
Start: 2024-08-14 | End: 2024-08-28

## 2024-08-14 NOTE — TELEPHONE ENCOUNTER
D/c Hospital f/u 8/7/24 LOV 7/2024 EGD Carlos, 2021 In[facility]ente PERINEAL ABCESS INCISION AND DRAINAGE (LITHOTOMY, Please advise patient for scheduling recommendations [phone]

## 2024-08-16 NOTE — TELEPHONE ENCOUNTER
MA left message to schedule hospital follow up with Dr. Gonzalez at next available clinic.  Electronically signed by Reyna Valencia MA on 8/16/24 at 9:50 AM EDT

## 2024-08-19 ENCOUNTER — TELEPHONE (OUTPATIENT)
Dept: ORTHOPEDIC SURGERY | Age: 39
End: 2024-08-19

## 2024-08-19 NOTE — TELEPHONE ENCOUNTER
Referral received for patient via internal work-queue.     Referral reason/diagnosis: Chronic right shoulder pain     Routed to providers for recommendations.    Future Appointments   Date Time Provider Department Center   11/25/2024 11:30 AM Brennan Dyer MD [facility]   1/31/2025 12:30 PM Rogers Rodríguez MD [facility]       Electronically signed by Krystina Badillo on 8/19/2024 at 7:40 AM

## 2024-08-21 ASSESSMENT — ENCOUNTER SYMPTOMS
NAUSEA: 0
VOMITING: 0
CONSTIPATION: 0
SHORTNESS OF BREATH: 0
BACK PAIN: 0
WHEEZING: 0
DIARRHEA: 0
COLOR CHANGE: 0
EYE REDNESS: 0
CHEST TIGHTNESS: 0
ABDOMINAL PAIN: 0
COUGH: 0
BLOOD IN STOOL: 0

## 2024-08-26 ASSESSMENT — ENCOUNTER SYMPTOMS
EYES NEGATIVE: 1
WHEEZING: 0
SHORTNESS OF BREATH: 1
CHEST TIGHTNESS: 0
COUGH: 0

## 2024-08-26 NOTE — PROGRESS NOTES
Michelle ORDONEZ Lencho Nettles  : 1985    Chief Complaint:     Chief Complaint   Patient presents with    Follow-Up from Hospital     Hospital follow up on 24.       HPI  This is a 39-year-old lady with past medical history of ESRD on hemodialysis, anemia, type 1 diabetes mellitus, hypertension, chronic respiratory failure with home oxygen, HFpEF who presented to [facility] ED on 2024 for evaluation of shortness of breath.  She was supposed to go to hemodialysis on 2024 however patient was too weak to show up and had increased shortness of breath despite on oxygen, patient was dyspneic on nasal cannula in the ED.  Patient feels that her arms and legs are getting more swollen.     2024 patient has been started on BiPAP.  Receiving hemodialysis.  Hemoglobin dropped down to 6.2.  Receiving 1 unit of packed red blood cells.  Nephrology, cardiology, pulmonology has been consulted.      patient states that her shortness of breath is improved after hemodialysis.  Seen at hemodialysis unit.  She was concerned about her facial abscess on the right.  General surgery has been consulted.     8/3 patient had large bloody bowel movement.  General surgery has been consulted.  She has received 2 units of PRBC so far.  S/p I&D of right facial abscess.  Breathing is much better      patient's blood glucose was elevated to more than 500, patient appears to have labile blood glucose.  Postop wound on face to be cleaned daily with saline and cover with dry dressing.  She underwent colonoscopy last month with Dr. Gonzalez.  Hemorrhoid was the only thing that was found.  Patient does have little pain with defecation.  Anusol was started.  No plans for colonoscopy or EGD.  Await repeat BMP, nasal swab for MRSA screen.  Potential discharge later today or in next 24 hours.   patient reports having small amount of blood in her stool.  Discussed with general surgery about resuming Plavix.   She is alert.           The ASCVD Risk score (Franchesca GARDNER, et al., 2019) failed to calculate for the following reasons:    Cannot find a previous HDL lab    Cannot find a previous total cholesterol lab    ASSESSMENT/PLAN:    1. Poorly controlled type 1 diabetes mellitus (HCC)  -     External Referral To Home Health  2. Chronic hypoxic respiratory failure, on home oxygen therapy (HCC)  -     External Referral To Home Health  3. ESRD on hemodialysis (HCC)  -     External Referral To Home Health  4. Chronic right shoulder pain  -     [facility]  5. Adhesive capsulitis of right shoulder  -     [facility]  6. Pain, dental  -     amoxicillin (AMOXIL) 500 MG capsule; Take 1 capsule by mouth 2 times daily for 14 days, Disp-28 capsule, R-0Normal        Reviewed age and gender appropriate health screening exams and vaccinations.  Advised patient regarding importance of keeping up with recommended health maintenance and to schedule as soon as possible if overdue, as this is important in assessing for undiagnosed pathology, especially cancer.  Patient verbalizes understanding and agrees.      Call or go to ED immediately if symptoms worsen or persist.  No follow-ups on file. Sooner if necessary.      Counseled regarding above diagnosis(es), including possible risks and complications, especially if left uncontrolled.  Counseled regarding the possible side effects, risks, benefits and alternatives to treatment; patient and/or guardian verbalizes understanding. Advised patient to call with any new medication issues. All questions answered.    Rogers Rodríguez MD

## 2024-09-01 PROBLEM — R79.89 ELEVATED TROPONIN: Status: RESOLVED | Noted: 2021-01-27 | Resolved: 2024-09-01

## 2024-09-05 ENCOUNTER — OFFICE VISIT (OUTPATIENT)
Dept: PRIMARY CARE CLINIC | Age: 39
End: 2024-09-05
Payer: MEDICARE

## 2024-09-05 VITALS
RESPIRATION RATE: 16 BRPM | TEMPERATURE: 98 F | DIASTOLIC BLOOD PRESSURE: 60 MMHG | OXYGEN SATURATION: 100 % | SYSTOLIC BLOOD PRESSURE: 97 MMHG | WEIGHT: 138.2 LBS | HEIGHT: 59 IN | HEART RATE: 95 BPM | BODY MASS INDEX: 27.86 KG/M2

## 2024-09-05 DIAGNOSIS — J30.2 SEASONAL ALLERGIES: Primary | ICD-10-CM

## 2024-09-05 DIAGNOSIS — K08.89 PAIN, DENTAL: ICD-10-CM

## 2024-09-05 PROCEDURE — G8427 DOCREV CUR MEDS BY ELIG CLIN: HCPCS | Performed by: FAMILY MEDICINE

## 2024-09-05 PROCEDURE — 4004F PT TOBACCO SCREEN RCVD TLK: CPT | Performed by: FAMILY MEDICINE

## 2024-09-05 PROCEDURE — 3078F DIAST BP <80 MM HG: CPT | Performed by: FAMILY MEDICINE

## 2024-09-05 PROCEDURE — 3074F SYST BP LT 130 MM HG: CPT | Performed by: FAMILY MEDICINE

## 2024-09-05 PROCEDURE — 99213 OFFICE O/P EST LOW 20 MIN: CPT | Performed by: FAMILY MEDICINE

## 2024-09-05 PROCEDURE — 1111F DSCHRG MED/CURRENT MED MERGE: CPT | Performed by: FAMILY MEDICINE

## 2024-09-05 PROCEDURE — G8417 CALC BMI ABV UP PARAM F/U: HCPCS | Performed by: FAMILY MEDICINE

## 2024-09-05 RX ORDER — OLOPATADINE HYDROCHLORIDE 1 MG/ML
1 SOLUTION/ DROPS OPHTHALMIC 2 TIMES DAILY PRN
Qty: 5 ML | Refills: 1 | Status: SHIPPED | OUTPATIENT
Start: 2024-09-05

## 2024-09-05 RX ORDER — LORATADINE 10 MG/1
10 TABLET ORAL EVERY OTHER DAY
Qty: 45 TABLET | Refills: 0 | Status: SHIPPED | OUTPATIENT
Start: 2024-09-05

## 2024-09-05 RX ORDER — AMOXICILLIN 500 MG/1
500 CAPSULE ORAL 2 TIMES DAILY
Qty: 20 CAPSULE | Refills: 0 | Status: SHIPPED | OUTPATIENT
Start: 2024-09-05 | End: 2024-09-15

## 2024-09-05 RX ORDER — DIPHENHYDRAMINE HCL 25 MG
25 TABLET ORAL DAILY PRN
Qty: 45 TABLET | Refills: 0 | Status: SHIPPED | OUTPATIENT
Start: 2024-09-05

## 2024-09-16 ASSESSMENT — ENCOUNTER SYMPTOMS
SINUS PAIN: 0
CHEST TIGHTNESS: 0
COUGH: 0
RHINORRHEA: 1
SORE THROAT: 0
WHEEZING: 0
SINUS PRESSURE: 1
SHORTNESS OF BREATH: 0
TROUBLE SWALLOWING: 0

## 2024-10-09 RX ORDER — PANTOPRAZOLE SODIUM 40 MG/1
40 TABLET, DELAYED RELEASE ORAL
Qty: 90 TABLET | OUTPATIENT
Start: 2024-10-09

## 2024-10-17 RX ORDER — INSULIN ASPART 100 [IU]/ML
INJECTION, SOLUTION INTRAVENOUS; SUBCUTANEOUS
Qty: 15 ML | Refills: 1 | Status: SHIPPED | OUTPATIENT
Start: 2024-10-17

## 2024-10-20 ENCOUNTER — PATIENT MESSAGE (OUTPATIENT)
Dept: ENDOCRINOLOGY | Age: 39
End: 2024-10-20

## 2024-10-21 RX ORDER — INSULIN ASPART 100 [IU]/ML
INJECTION, SOLUTION INTRAVENOUS; SUBCUTANEOUS
Qty: 45 ML | Refills: 1 | Status: SHIPPED | OUTPATIENT
Start: 2024-10-21

## 2024-10-23 RX ORDER — IBUPROFEN 600 MG/1
1 TABLET ORAL PRN
Qty: 1 KIT | Refills: 5 | Status: SHIPPED | OUTPATIENT
Start: 2024-10-23

## 2024-11-25 DIAGNOSIS — Z99.2 TYPE 1 DIABETES MELLITUS WITH CHRONIC KIDNEY DISEASE ON CHRONIC DIALYSIS (HCC): ICD-10-CM

## 2024-11-25 DIAGNOSIS — E10.22 TYPE 1 DIABETES MELLITUS WITH CHRONIC KIDNEY DISEASE ON CHRONIC DIALYSIS (HCC): ICD-10-CM

## 2024-11-25 DIAGNOSIS — N18.6 TYPE 1 DIABETES MELLITUS WITH CHRONIC KIDNEY DISEASE ON CHRONIC DIALYSIS (HCC): ICD-10-CM

## 2024-11-25 RX ORDER — INSULIN LISPRO 100 [IU]/ML
4 INJECTION, SOLUTION INTRAVENOUS; SUBCUTANEOUS
Qty: 5 ADJUSTABLE DOSE PRE-FILLED PEN SYRINGE | Refills: 3 | Status: SHIPPED | OUTPATIENT
Start: 2024-11-25

## 2024-11-25 RX ORDER — PEN NEEDLE, DIABETIC 31 GX5/16"
NEEDLE, DISPOSABLE MISCELLANEOUS
Qty: 1 EACH | Refills: 3 | Status: SHIPPED | OUTPATIENT
Start: 2024-11-25

## 2024-11-26 ENCOUNTER — PATIENT MESSAGE (OUTPATIENT)
Dept: ENDOCRINOLOGY | Age: 39
End: 2024-11-26

## 2024-12-03 DIAGNOSIS — N18.6 TYPE 1 DIABETES MELLITUS WITH CHRONIC KIDNEY DISEASE ON CHRONIC DIALYSIS (HCC): ICD-10-CM

## 2024-12-03 DIAGNOSIS — E10.22 TYPE 1 DIABETES MELLITUS WITH CHRONIC KIDNEY DISEASE ON CHRONIC DIALYSIS (HCC): ICD-10-CM

## 2024-12-03 DIAGNOSIS — Z99.2 TYPE 1 DIABETES MELLITUS WITH CHRONIC KIDNEY DISEASE ON CHRONIC DIALYSIS (HCC): ICD-10-CM

## 2024-12-04 RX ORDER — INSULIN LISPRO 100 [IU]/ML
4 INJECTION, SOLUTION INTRAVENOUS; SUBCUTANEOUS
Qty: 15 ML | Refills: 5 | Status: SHIPPED | OUTPATIENT
Start: 2024-12-04

## 2024-12-04 RX ORDER — IBUPROFEN 600 MG/1
1 TABLET ORAL PRN
Qty: 1 KIT | Refills: 5 | Status: SHIPPED | OUTPATIENT
Start: 2024-12-04

## 2024-12-16 DIAGNOSIS — Z99.2 TYPE 1 DIABETES MELLITUS WITH CHRONIC KIDNEY DISEASE ON CHRONIC DIALYSIS (HCC): Primary | ICD-10-CM

## 2024-12-16 DIAGNOSIS — J30.2 SEASONAL ALLERGIES: ICD-10-CM

## 2024-12-16 DIAGNOSIS — E10.22 TYPE 1 DIABETES MELLITUS WITH CHRONIC KIDNEY DISEASE ON CHRONIC DIALYSIS (HCC): Primary | ICD-10-CM

## 2024-12-16 DIAGNOSIS — N18.6 TYPE 1 DIABETES MELLITUS WITH CHRONIC KIDNEY DISEASE ON CHRONIC DIALYSIS (HCC): Primary | ICD-10-CM

## 2024-12-16 RX ORDER — BLOOD-GLUCOSE METER
EACH MISCELLANEOUS
Qty: 1 KIT | Refills: 0 | Status: SHIPPED | OUTPATIENT
Start: 2024-12-16

## 2024-12-16 RX ORDER — LANCETS
EACH MISCELLANEOUS
Qty: 300 EACH | Refills: 3 | Status: SHIPPED | OUTPATIENT
Start: 2024-12-16

## 2024-12-16 RX ORDER — BLOOD SUGAR DIAGNOSTIC
STRIP MISCELLANEOUS
Qty: 300 EACH | Refills: 3 | Status: SHIPPED | OUTPATIENT
Start: 2024-12-16

## 2024-12-16 RX ORDER — LORATADINE 10 MG/1
10 TABLET ORAL EVERY OTHER DAY
Qty: 45 TABLET | Refills: 1 | Status: SHIPPED | OUTPATIENT
Start: 2024-12-16

## 2024-12-16 NOTE — TELEPHONE ENCOUNTER
Name of Medication(s) Requested:  Requested Prescriptions     Pending Prescriptions Disp Refills    loratadine (CLARITIN) 10 MG tablet 45 tablet 0     Sig: Take 1 tablet by mouth every other day       Medication is on current medication list Yes    Dosage and directions were verified? Yes    Quantity verified: 30 day supply     Pharmacy Verified?  Yes    Last Appointment:  9/5/2024    Future appts:  Future Appointments   Date Time Provider Department Center   1/7/2025 11:30 AM Brennan Dyer MD [facility]   1/31/2025 12:30 PM [facility]        (If no appt send self scheduling link. .REFILLAPPT)  Scheduling request sent?     [] Yes  [] No    Does patient need updated?  [] Yes  [] No

## 2024-12-22 RX ORDER — CARVEDILOL 25 MG/1
25 TABLET ORAL
Qty: 90 TABLET | Refills: 0 | Status: SHIPPED | OUTPATIENT
Start: 2024-12-22

## 2024-12-22 RX ORDER — CARVEDILOL 25 MG/1
25 TABLET ORAL
Qty: 30 TABLET | Refills: 0 | Status: SHIPPED
Start: 2024-12-22 | End: 2024-12-22

## 2024-12-24 DIAGNOSIS — N18.6 TYPE 1 DIABETES MELLITUS WITH CHRONIC KIDNEY DISEASE ON CHRONIC DIALYSIS (HCC): ICD-10-CM

## 2024-12-24 DIAGNOSIS — Z99.2 TYPE 1 DIABETES MELLITUS WITH CHRONIC KIDNEY DISEASE ON CHRONIC DIALYSIS (HCC): ICD-10-CM

## 2024-12-24 DIAGNOSIS — E10.22 TYPE 1 DIABETES MELLITUS WITH CHRONIC KIDNEY DISEASE ON CHRONIC DIALYSIS (HCC): ICD-10-CM

## 2024-12-24 RX ORDER — PROCHLORPERAZINE 25 MG/1
SUPPOSITORY RECTAL
Qty: 9 EACH | Refills: 5 | OUTPATIENT
Start: 2024-12-24

## 2024-12-24 RX ORDER — PROCHLORPERAZINE 25 MG/1
SUPPOSITORY RECTAL
Qty: 1 EACH | Refills: 5 | OUTPATIENT
Start: 2024-12-24

## 2025-01-03 DIAGNOSIS — N18.6 TYPE 1 DIABETES MELLITUS WITH CHRONIC KIDNEY DISEASE ON CHRONIC DIALYSIS (HCC): Primary | ICD-10-CM

## 2025-01-03 DIAGNOSIS — Z99.2 TYPE 1 DIABETES MELLITUS WITH CHRONIC KIDNEY DISEASE ON CHRONIC DIALYSIS (HCC): Primary | ICD-10-CM

## 2025-01-03 DIAGNOSIS — E10.22 TYPE 1 DIABETES MELLITUS WITH CHRONIC KIDNEY DISEASE ON CHRONIC DIALYSIS (HCC): Primary | ICD-10-CM

## 2025-01-03 RX ORDER — PROCHLORPERAZINE 25 MG/1
SUPPOSITORY RECTAL
Qty: 1 EACH | Refills: 0 | Status: SHIPPED | OUTPATIENT
Start: 2025-01-03

## 2025-01-09 ENCOUNTER — APPOINTMENT (OUTPATIENT)
Dept: GENERAL RADIOLOGY | Age: 40
DRG: 291 | End: 2025-01-09
Payer: MEDICARE

## 2025-01-09 LAB
ALBUMIN SERPL-MCNC: 3.5 G/DL (ref 3.5–5.2)
ALP SERPL-CCNC: 96 U/L (ref 35–104)
ALT SERPL-CCNC: 12 U/L (ref 0–32)
ANION GAP SERPL CALCULATED.3IONS-SCNC: 22 MMOL/L (ref 7–16)
AST SERPL-CCNC: 32 U/L (ref 0–31)
B-HCG SERPL EIA 3RD IS-ACNC: 1.4 MIU/ML (ref 0–7)
BASOPHILS # BLD: 0.01 K/UL (ref 0–0.2)
BASOPHILS NFR BLD: 0 % (ref 0–2)
BILIRUB SERPL-MCNC: 0.3 MG/DL (ref 0–1.2)
BUN SERPL-MCNC: 78 MG/DL (ref 6–20)
CALCIUM SERPL-MCNC: 8 MG/DL (ref 8.6–10.2)
CHLORIDE SERPL-SCNC: 91 MMOL/L (ref 98–107)
CO2 SERPL-SCNC: 23 MMOL/L (ref 22–29)
CREAT SERPL-MCNC: 14.1 MG/DL (ref 0.5–1)
EOSINOPHIL # BLD: 0.21 K/UL (ref 0.05–0.5)
EOSINOPHILS RELATIVE PERCENT: 4 % (ref 0–6)
ERYTHROCYTE [DISTWIDTH] IN BLOOD BY AUTOMATED COUNT: 15.4 % (ref 11.5–15)
GFR, ESTIMATED: 3 ML/MIN/1.73M2
GLUCOSE SERPL-MCNC: 146 MG/DL (ref 74–99)
HCT VFR BLD AUTO: 17.7 % (ref 34–48)
HGB BLD-MCNC: 6.1 G/DL (ref 11.5–15.5)
IMM GRANULOCYTES # BLD AUTO: 0.03 K/UL (ref 0–0.58)
IMM GRANULOCYTES NFR BLD: 1 % (ref 0–5)
LYMPHOCYTES NFR BLD: 0.92 K/UL (ref 1.5–4)
LYMPHOCYTES RELATIVE PERCENT: 18 % (ref 20–42)
MCH RBC QN AUTO: 33 PG (ref 26–35)
MCHC RBC AUTO-ENTMCNC: 34.5 G/DL (ref 32–34.5)
MCV RBC AUTO: 95.7 FL (ref 80–99.9)
MONOCYTES NFR BLD: 0.43 K/UL (ref 0.1–0.95)
MONOCYTES NFR BLD: 8 % (ref 2–12)
NEUTROPHILS NFR BLD: 70 % (ref 43–80)
NEUTS SEG NFR BLD: 3.67 K/UL (ref 1.8–7.3)
PLATELET CONFIRMATION: NORMAL
PLATELET, FLUORESCENCE: 195 K/UL (ref 130–450)
PMV BLD AUTO: 10.9 FL (ref 7–12)
POTASSIUM SERPL-SCNC: 4.9 MMOL/L (ref 3.5–5)
PROT SERPL-MCNC: 7.3 G/DL (ref 6.4–8.3)
RBC # BLD AUTO: 1.85 M/UL (ref 3.5–5.5)
SODIUM SERPL-SCNC: 136 MMOL/L (ref 132–146)
WBC OTHER # BLD: 5.3 K/UL (ref 4.5–11.5)

## 2025-01-09 PROCEDURE — 71045 X-RAY EXAM CHEST 1 VIEW: CPT

## 2025-01-09 PROCEDURE — 93005 ELECTROCARDIOGRAM TRACING: CPT | Performed by: EMERGENCY MEDICINE

## 2025-01-09 PROCEDURE — 84484 ASSAY OF TROPONIN QUANT: CPT

## 2025-01-09 PROCEDURE — 86901 BLOOD TYPING SEROLOGIC RH(D): CPT

## 2025-01-09 PROCEDURE — 99285 EMERGENCY DEPT VISIT HI MDM: CPT

## 2025-01-09 PROCEDURE — 85025 COMPLETE CBC W/AUTO DIFF WBC: CPT

## 2025-01-09 PROCEDURE — 86923 COMPATIBILITY TEST ELECTRIC: CPT

## 2025-01-09 PROCEDURE — 84702 CHORIONIC GONADOTROPIN TEST: CPT

## 2025-01-09 PROCEDURE — 80053 COMPREHEN METABOLIC PANEL: CPT

## 2025-01-09 PROCEDURE — 86850 RBC ANTIBODY SCREEN: CPT

## 2025-01-09 PROCEDURE — 86900 BLOOD TYPING SEROLOGIC ABO: CPT

## 2025-01-10 ENCOUNTER — HOSPITAL ENCOUNTER (INPATIENT)
Age: 40
LOS: 5 days | Discharge: HOME | DRG: 291 | End: 2025-01-15
Attending: HOSPITALIST | Admitting: HOSPITALIST
Payer: MEDICARE

## 2025-01-10 ENCOUNTER — APPOINTMENT (OUTPATIENT)
Dept: GENERAL RADIOLOGY | Age: 40
DRG: 291 | End: 2025-01-10
Payer: MEDICARE

## 2025-01-10 DIAGNOSIS — R06.02 SHORTNESS OF BREATH: ICD-10-CM

## 2025-01-10 DIAGNOSIS — D64.9 ANEMIA, UNSPECIFIED TYPE: Primary | ICD-10-CM

## 2025-01-10 DIAGNOSIS — N18.6 END STAGE RENAL DISEASE (HCC): ICD-10-CM

## 2025-01-10 LAB
ANION GAP SERPL CALCULATED.3IONS-SCNC: 22 MMOL/L (ref 7–16)
B PARAP IS1001 DNA NPH QL NAA+NON-PROBE: NOT DETECTED
B PERT DNA SPEC QL NAA+PROBE: NOT DETECTED
BASOPHILS # BLD: 0.03 K/UL (ref 0–0.2)
BASOPHILS NFR BLD: 0 % (ref 0–2)
BNP SERPL-MCNC: ABNORMAL PG/ML (ref 0–125)
BUN SERPL-MCNC: 81 MG/DL (ref 6–20)
C PNEUM DNA NPH QL NAA+NON-PROBE: NOT DETECTED
CALCIUM SERPL-MCNC: 8 MG/DL (ref 8.6–10.2)
CHLORIDE SERPL-SCNC: 91 MMOL/L (ref 98–107)
CO2 SERPL-SCNC: 23 MMOL/L (ref 22–29)
CREAT SERPL-MCNC: 15.3 MG/DL (ref 0.5–1)
EKG ATRIAL RATE: 110 BPM
EKG P AXIS: 40 DEGREES
EKG P-R INTERVAL: 122 MS
EKG Q-T INTERVAL: 342 MS
EKG QRS DURATION: 68 MS
EKG QTC CALCULATION (BAZETT): 462 MS
EKG R AXIS: 28 DEGREES
EKG T AXIS: -59 DEGREES
EKG VENTRICULAR RATE: 110 BPM
EOSINOPHIL # BLD: 0.24 K/UL (ref 0.05–0.5)
EOSINOPHILS RELATIVE PERCENT: 3 % (ref 0–6)
ERYTHROCYTE [DISTWIDTH] IN BLOOD BY AUTOMATED COUNT: 16.2 % (ref 11.5–15)
FLUAV RNA NPH QL NAA+NON-PROBE: NOT DETECTED
FLUBV RNA NPH QL NAA+NON-PROBE: NOT DETECTED
GFR, ESTIMATED: 3 ML/MIN/1.73M2
GLUCOSE BLD-MCNC: 187 MG/DL (ref 74–99)
GLUCOSE BLD-MCNC: 265 MG/DL (ref 74–99)
GLUCOSE BLD-MCNC: 327 MG/DL (ref 74–99)
GLUCOSE BLD-MCNC: 417 MG/DL (ref 74–99)
GLUCOSE BLD-MCNC: 419 MG/DL (ref 74–99)
GLUCOSE SERPL-MCNC: 358 MG/DL (ref 74–99)
HADV DNA NPH QL NAA+NON-PROBE: NOT DETECTED
HCOV 229E RNA NPH QL NAA+NON-PROBE: NOT DETECTED
HCOV HKU1 RNA NPH QL NAA+NON-PROBE: NOT DETECTED
HCOV NL63 RNA NPH QL NAA+NON-PROBE: NOT DETECTED
HCOV OC43 RNA NPH QL NAA+NON-PROBE: NOT DETECTED
HCT VFR BLD AUTO: 21.8 % (ref 34–48)
HGB BLD-MCNC: 7.5 G/DL (ref 11.5–15.5)
HMPV RNA NPH QL NAA+NON-PROBE: NOT DETECTED
HPIV1 RNA NPH QL NAA+NON-PROBE: NOT DETECTED
HPIV2 RNA NPH QL NAA+NON-PROBE: NOT DETECTED
HPIV3 RNA NPH QL NAA+NON-PROBE: NOT DETECTED
HPIV4 RNA NPH QL NAA+NON-PROBE: NOT DETECTED
IMM GRANULOCYTES # BLD AUTO: 0.04 K/UL (ref 0–0.58)
IMM GRANULOCYTES NFR BLD: 1 % (ref 0–5)
LYMPHOCYTES NFR BLD: 1.03 K/UL (ref 1.5–4)
LYMPHOCYTES RELATIVE PERCENT: 15 % (ref 20–42)
M PNEUMO DNA NPH QL NAA+NON-PROBE: NOT DETECTED
MCH RBC QN AUTO: 32.3 PG (ref 26–35)
MCHC RBC AUTO-ENTMCNC: 34.4 G/DL (ref 32–34.5)
MCV RBC AUTO: 94 FL (ref 80–99.9)
MONOCYTES NFR BLD: 0.4 K/UL (ref 0.1–0.95)
MONOCYTES NFR BLD: 6 % (ref 2–12)
NEUTROPHILS NFR BLD: 75 % (ref 43–80)
NEUTS SEG NFR BLD: 5.28 K/UL (ref 1.8–7.3)
PLATELET # BLD AUTO: 206 K/UL (ref 130–450)
PMV BLD AUTO: 10.1 FL (ref 7–12)
POTASSIUM SERPL-SCNC: 5.2 MMOL/L (ref 3.5–5)
RBC # BLD AUTO: 2.32 M/UL (ref 3.5–5.5)
RSV RNA NPH QL NAA+NON-PROBE: NOT DETECTED
RV+EV RNA NPH QL NAA+NON-PROBE: NOT DETECTED
SARS-COV-2 RNA NPH QL NAA+NON-PROBE: NOT DETECTED
SODIUM SERPL-SCNC: 136 MMOL/L (ref 132–146)
SPECIMEN DESCRIPTION: NORMAL
TROPONIN I SERPL HS-MCNC: 118 NG/L (ref 0–9)
TROPONIN I SERPL HS-MCNC: 128 NG/L (ref 0–9)
WBC OTHER # BLD: 7 K/UL (ref 4.5–11.5)

## 2025-01-10 PROCEDURE — 84484 ASSAY OF TROPONIN QUANT: CPT

## 2025-01-10 PROCEDURE — 80048 BASIC METABOLIC PNL TOTAL CA: CPT

## 2025-01-10 PROCEDURE — 6360000002 HC RX W HCPCS: Performed by: INTERNAL MEDICINE

## 2025-01-10 PROCEDURE — 5A1D70Z PERFORMANCE OF URINARY FILTRATION, INTERMITTENT, LESS THAN 6 HOURS PER DAY: ICD-10-PCS | Performed by: STUDENT IN AN ORGANIZED HEALTH CARE EDUCATION/TRAINING PROGRAM

## 2025-01-10 PROCEDURE — 0202U NFCT DS 22 TRGT SARS-COV-2: CPT

## 2025-01-10 PROCEDURE — 85025 COMPLETE CBC W/AUTO DIFF WBC: CPT

## 2025-01-10 PROCEDURE — 82962 GLUCOSE BLOOD TEST: CPT

## 2025-01-10 PROCEDURE — 90935 HEMODIALYSIS ONE EVALUATION: CPT

## 2025-01-10 PROCEDURE — P9016 RBC LEUKOCYTES REDUCED: HCPCS

## 2025-01-10 PROCEDURE — 36430 TRANSFUSION BLD/BLD COMPNT: CPT

## 2025-01-10 PROCEDURE — 94640 AIRWAY INHALATION TREATMENT: CPT

## 2025-01-10 PROCEDURE — 2500000003 HC RX 250 WO HCPCS

## 2025-01-10 PROCEDURE — 2140000000 HC CCU INTERMEDIATE R&B

## 2025-01-10 PROCEDURE — 6370000000 HC RX 637 (ALT 250 FOR IP): Performed by: HOSPITALIST

## 2025-01-10 PROCEDURE — 6370000000 HC RX 637 (ALT 250 FOR IP)

## 2025-01-10 PROCEDURE — 30233N1 TRANSFUSION OF NONAUTOLOGOUS RED BLOOD CELLS INTO PERIPHERAL VEIN, PERCUTANEOUS APPROACH: ICD-10-PCS | Performed by: STUDENT IN AN ORGANIZED HEALTH CARE EDUCATION/TRAINING PROGRAM

## 2025-01-10 PROCEDURE — 6370000000 HC RX 637 (ALT 250 FOR IP): Performed by: INTERNAL MEDICINE

## 2025-01-10 PROCEDURE — 99222 1ST HOSP IP/OBS MODERATE 55: CPT

## 2025-01-10 PROCEDURE — 94664 DEMO&/EVAL PT USE INHALER: CPT

## 2025-01-10 PROCEDURE — 71045 X-RAY EXAM CHEST 1 VIEW: CPT

## 2025-01-10 PROCEDURE — 83880 ASSAY OF NATRIURETIC PEPTIDE: CPT

## 2025-01-10 PROCEDURE — 2500000003 HC RX 250 WO HCPCS: Performed by: INTERNAL MEDICINE

## 2025-01-10 PROCEDURE — 93010 ELECTROCARDIOGRAM REPORT: CPT | Performed by: INTERNAL MEDICINE

## 2025-01-10 RX ORDER — CARVEDILOL 25 MG/1
25 TABLET ORAL EVERY MORNING
Status: DISCONTINUED | OUTPATIENT
Start: 2025-01-10 | End: 2025-01-15 | Stop reason: HOSPADM

## 2025-01-10 RX ORDER — ONDANSETRON 4 MG/1
4 TABLET, ORALLY DISINTEGRATING ORAL EVERY 8 HOURS PRN
Status: DISCONTINUED | OUTPATIENT
Start: 2025-01-10 | End: 2025-01-15 | Stop reason: HOSPADM

## 2025-01-10 RX ORDER — CLOPIDOGREL BISULFATE 75 MG/1
75 TABLET ORAL DAILY
Status: DISCONTINUED | OUTPATIENT
Start: 2025-01-10 | End: 2025-01-15 | Stop reason: HOSPADM

## 2025-01-10 RX ORDER — ACETAMINOPHEN 650 MG/1
650 SUPPOSITORY RECTAL EVERY 6 HOURS PRN
Status: DISCONTINUED | OUTPATIENT
Start: 2025-01-10 | End: 2025-01-15 | Stop reason: HOSPADM

## 2025-01-10 RX ORDER — INSULIN GLARGINE 100 [IU]/ML
6 INJECTION, SOLUTION SUBCUTANEOUS
Status: DISCONTINUED | OUTPATIENT
Start: 2025-01-10 | End: 2025-01-15 | Stop reason: HOSPADM

## 2025-01-10 RX ORDER — MULTIVITAMIN WITH IRON
1 TABLET ORAL DAILY
Status: DISCONTINUED | OUTPATIENT
Start: 2025-01-10 | End: 2025-01-12

## 2025-01-10 RX ORDER — PANTOPRAZOLE SODIUM 40 MG/1
40 TABLET, DELAYED RELEASE ORAL
Status: DISCONTINUED | OUTPATIENT
Start: 2025-01-10 | End: 2025-01-12

## 2025-01-10 RX ORDER — ONDANSETRON 2 MG/ML
4 INJECTION INTRAMUSCULAR; INTRAVENOUS EVERY 6 HOURS PRN
Status: DISCONTINUED | OUTPATIENT
Start: 2025-01-10 | End: 2025-01-15 | Stop reason: HOSPADM

## 2025-01-10 RX ORDER — GLUCAGON 1 MG/ML
1 KIT INJECTION PRN
Status: DISCONTINUED | OUTPATIENT
Start: 2025-01-10 | End: 2025-01-15 | Stop reason: HOSPADM

## 2025-01-10 RX ORDER — SUCRALFATE 1 G/1
1 TABLET ORAL
Status: DISCONTINUED | OUTPATIENT
Start: 2025-01-10 | End: 2025-01-15 | Stop reason: HOSPADM

## 2025-01-10 RX ORDER — DEXTROSE MONOHYDRATE 100 MG/ML
INJECTION, SOLUTION INTRAVENOUS CONTINUOUS PRN
Status: DISCONTINUED | OUTPATIENT
Start: 2025-01-10 | End: 2025-01-15 | Stop reason: HOSPADM

## 2025-01-10 RX ORDER — INSULIN LISPRO 100 [IU]/ML
6 INJECTION, SOLUTION INTRAVENOUS; SUBCUTANEOUS ONCE
Status: COMPLETED | OUTPATIENT
Start: 2025-01-10 | End: 2025-01-10

## 2025-01-10 RX ORDER — SODIUM CHLORIDE 0.9 % (FLUSH) 0.9 %
5-40 SYRINGE (ML) INJECTION PRN
Status: DISCONTINUED | OUTPATIENT
Start: 2025-01-10 | End: 2025-01-15 | Stop reason: HOSPADM

## 2025-01-10 RX ORDER — CALCIUM ACETATE 667 MG/1
3 CAPSULE ORAL
Status: DISCONTINUED | OUTPATIENT
Start: 2025-01-10 | End: 2025-01-15 | Stop reason: HOSPADM

## 2025-01-10 RX ORDER — SODIUM CHLORIDE 9 MG/ML
INJECTION, SOLUTION INTRAVENOUS PRN
Status: DISCONTINUED | OUTPATIENT
Start: 2025-01-10 | End: 2025-01-15 | Stop reason: HOSPADM

## 2025-01-10 RX ORDER — INSULIN LISPRO 100 [IU]/ML
0-4 INJECTION, SOLUTION INTRAVENOUS; SUBCUTANEOUS
Status: DISCONTINUED | OUTPATIENT
Start: 2025-01-10 | End: 2025-01-12

## 2025-01-10 RX ORDER — ALBUTEROL SULFATE 0.83 MG/ML
2.5 SOLUTION RESPIRATORY (INHALATION)
Status: DISCONTINUED | OUTPATIENT
Start: 2025-01-10 | End: 2025-01-15 | Stop reason: HOSPADM

## 2025-01-10 RX ORDER — POLYETHYLENE GLYCOL 3350 17 G/17G
17 POWDER, FOR SOLUTION ORAL DAILY PRN
Status: DISCONTINUED | OUTPATIENT
Start: 2025-01-10 | End: 2025-01-14

## 2025-01-10 RX ORDER — SODIUM CHLORIDE 0.9 % (FLUSH) 0.9 %
5-40 SYRINGE (ML) INJECTION EVERY 12 HOURS SCHEDULED
Status: DISCONTINUED | OUTPATIENT
Start: 2025-01-10 | End: 2025-01-15 | Stop reason: HOSPADM

## 2025-01-10 RX ORDER — ACETAMINOPHEN 325 MG/1
650 TABLET ORAL EVERY 6 HOURS PRN
Status: DISCONTINUED | OUTPATIENT
Start: 2025-01-10 | End: 2025-01-15 | Stop reason: HOSPADM

## 2025-01-10 RX ADMIN — CALCIUM ACETATE 2001 MG: 667 CAPSULE ORAL at 16:45

## 2025-01-10 RX ADMIN — INSULIN LISPRO 4 UNITS: 100 INJECTION, SOLUTION INTRAVENOUS; SUBCUTANEOUS at 10:37

## 2025-01-10 RX ADMIN — PANTOPRAZOLE SODIUM 40 MG: 40 TABLET, DELAYED RELEASE ORAL at 16:45

## 2025-01-10 RX ADMIN — INSULIN GLARGINE 6 UNITS: 100 INJECTION, SOLUTION SUBCUTANEOUS at 20:02

## 2025-01-10 RX ADMIN — ALBUTEROL SULFATE 2.5 MG: 2.5 SOLUTION RESPIRATORY (INHALATION) at 20:24

## 2025-01-10 RX ADMIN — CALCIUM ACETATE 2001 MG: 667 CAPSULE ORAL at 13:40

## 2025-01-10 RX ADMIN — INSULIN LISPRO 3 UNITS: 100 INJECTION, SOLUTION INTRAVENOUS; SUBCUTANEOUS at 22:16

## 2025-01-10 RX ADMIN — INSULIN LISPRO 6 UNITS: 100 INJECTION, SOLUTION INTRAVENOUS; SUBCUTANEOUS at 20:03

## 2025-01-10 RX ADMIN — SODIUM CHLORIDE, PRESERVATIVE FREE 10 ML: 5 INJECTION INTRAVENOUS at 20:06

## 2025-01-10 RX ADMIN — CARVEDILOL 25 MG: 25 TABLET, FILM COATED ORAL at 13:40

## 2025-01-10 RX ADMIN — MULTIVITAMIN TABLET 1 TABLET: TABLET at 13:40

## 2025-01-10 RX ADMIN — SODIUM CHLORIDE, PRESERVATIVE FREE 10 ML: 5 INJECTION INTRAVENOUS at 10:03

## 2025-01-10 RX ADMIN — CLOPIDOGREL BISULFATE 75 MG: 75 TABLET ORAL at 13:40

## 2025-01-10 RX ADMIN — SUCRALFATE 1 G: 1 TABLET ORAL at 20:02

## 2025-01-10 NOTE — ED PROVIDER NOTES
Tho is a 39 y.o. female history of kidney injury history of end-stage renal disease history of diabetes history of hypertension history of tobacco use presenting to the ED for low hemoglobin, beginning 1 day ago.  The complaint has been persistent, moderate in severity, and worsened by nothing.  Patient presenting her because of a low hemoglobin.  Patient is a dialysis patient last had dialysis on Monday she reports [facility] regarding [facility].  Patient reports that she scheduled for dialysis on Friday.  Patient reporting no chest pains or productive cough she does report feeling shortness of breath but reports she drove herself here and ran out of her oxygen and her oxygen tank.  Patient reporting no abdominal pain no vomiting she reports no black or tarry stools she reports no hematemesis she reports no leg pain.     Patient is awake alert oriented x 3 heart exam normal lungs are clear abdomen soft nontender patient able move all extremities she does have noted facial edema secondary her history of kidney disease.  Patient currently on nasal cannula oxygen.  Patient is in no respiratory distress.  Patient differential includes electrolyte disturbance as well as anemia as well as GI bleed as well as  Patient was ordered lab work white count was 5.3 was 6.1 platelet count was 195 sodium was 136 potassium is 4.9 BUN is 78 creatinine is 14 glucose is 146 quant was 1.4 patient troponin was 128 patient's chest x-ray showed no signs of acute pulmonary process.  Did show sinus tachycardia on her EKG.  Patient plan will be to admit due to her anemia as well as shortness of breath.  Patient also in need of dialysis.  I did speak to [facility] internal medicine.  Plan will be to admit to transfuse as well as have dialysis.  Social Determinants affecting Dx or Tx: Patient does smoke    Chronic Conditions: History of end-stage renal disease acute kidney injury history of diabetes history

## 2025-01-10 NOTE — FLOWSHEET NOTE
01/10/25 1700   Treatment   Time On 1303   Time Off 1703   Treatment Goal 2500   Observations & Evaluations   Level of Consciousness 0   Oriented X 3   Heart Rhythm Regular   Respiratory Quality/Effort Unlabored   O2 Device Nasal cannula   Bilateral Breath Sounds Diminished   Skin Color Other (comment)  (normal for ethnicity)   Skin Condition/Temp Dry   Bowel Sounds (All Quadrants) Active   Edema Facial   Facial Edema +1   Vital Signs   BP (!) 156/78   Temp 97.3 °F (36.3 °C)   Pulse (!) 111   Respirations 18   Weight - Scale   (unable to assess. ED cot)   Pain Assessment   Pain Assessment None - Denies Pain   Post-Hemodialysis Assessment   Post-Treatment Procedures Blood returned;Access bleeding time < 10 minutes   Machine Disinfection Process Acid/Vinegar Clean;Heat Disinfect;Exterior Machine Disinfection   Rinseback Volume (ml) 300 ml   Blood Volume Processed (Liters) 64.7 L   Dialyzer Clearance Clear   Duration of Treatment (minutes) 215 minutes   Hemodialysis Intake (ml) 400 ml   Hemodialysis Output (ml) 2857 ml   NET Removed (ml) 2457   Tolerated Treatment Good   Patient Disposition Remain in ICU/ED     HD tolerated procedure well. Ran 4 hours on 2K 2.5 Ca bath. UF 2457 mL NET

## 2025-01-10 NOTE — H&P
team will assume patient's care.     +++++++++++++++++++++++++++++++++++++++++++++++++  NOTE: This report was transcribed using voice recognition software. Every effort was made to ensure accuracy; however, inadvertent computerized transcription errors may be present.

## 2025-01-10 NOTE — ED NOTES
Attending notified of patient having difficulty breathing and oxygen at 87% on 6L, patient c/o chest pain. Per attending do an EKG and draw a troponin on patient.

## 2025-01-10 NOTE — PLAN OF CARE
Patient seen and examined.  She is complaining of difficulty breathing.  Saturations are well-maintained, she wears 5 L at home.  Blood transfusion completed.  She denies any active bleeding.  For hemodialysis today.  She typically goes Monday Wednesday Friday and missed this week on Wednesday.

## 2025-01-11 ENCOUNTER — APPOINTMENT (OUTPATIENT)
Dept: GENERAL RADIOLOGY | Age: 40
DRG: 291 | End: 2025-01-11
Payer: MEDICARE

## 2025-01-11 LAB
ALBUMIN SERPL-MCNC: 3.6 G/DL (ref 3.5–5.2)
ALP SERPL-CCNC: 107 U/L (ref 35–104)
ALT SERPL-CCNC: 9 U/L (ref 0–32)
ANION GAP SERPL CALCULATED.3IONS-SCNC: 17 MMOL/L (ref 7–16)
AST SERPL-CCNC: 11 U/L (ref 0–31)
BASOPHILS # BLD: 0.02 K/UL (ref 0–0.2)
BASOPHILS NFR BLD: 0 % (ref 0–2)
BILIRUB SERPL-MCNC: 0.3 MG/DL (ref 0–1.2)
BUN SERPL-MCNC: 56 MG/DL (ref 6–20)
CALCIUM SERPL-MCNC: 8.2 MG/DL (ref 8.6–10.2)
CHLORIDE SERPL-SCNC: 98 MMOL/L (ref 98–107)
CO2 SERPL-SCNC: 26 MMOL/L (ref 22–29)
CREAT SERPL-MCNC: 10.9 MG/DL (ref 0.5–1)
EOSINOPHIL # BLD: 0.26 K/UL (ref 0.05–0.5)
EOSINOPHILS RELATIVE PERCENT: 4 % (ref 0–6)
ERYTHROCYTE [DISTWIDTH] IN BLOOD BY AUTOMATED COUNT: 16.3 % (ref 11.5–15)
GFR, ESTIMATED: 4 ML/MIN/1.73M2
GLUCOSE BLD-MCNC: 179 MG/DL (ref 74–99)
GLUCOSE BLD-MCNC: 274 MG/DL (ref 74–99)
GLUCOSE BLD-MCNC: 351 MG/DL (ref 74–99)
GLUCOSE BLD-MCNC: 373 MG/DL (ref 74–99)
GLUCOSE SERPL-MCNC: 168 MG/DL (ref 74–99)
HCT VFR BLD AUTO: 21.9 % (ref 34–48)
HGB BLD-MCNC: 7.4 G/DL (ref 11.5–15.5)
IMM GRANULOCYTES # BLD AUTO: <0.03 K/UL (ref 0–0.58)
IMM GRANULOCYTES NFR BLD: 0 % (ref 0–5)
LYMPHOCYTES NFR BLD: 1.01 K/UL (ref 1.5–4)
LYMPHOCYTES RELATIVE PERCENT: 15 % (ref 20–42)
MAGNESIUM SERPL-MCNC: 1.9 MG/DL (ref 1.6–2.6)
MCH RBC QN AUTO: 32.2 PG (ref 26–35)
MCHC RBC AUTO-ENTMCNC: 33.8 G/DL (ref 32–34.5)
MCV RBC AUTO: 95.2 FL (ref 80–99.9)
MONOCYTES NFR BLD: 0.53 K/UL (ref 0.1–0.95)
MONOCYTES NFR BLD: 8 % (ref 2–12)
NEUTROPHILS NFR BLD: 73 % (ref 43–80)
NEUTS SEG NFR BLD: 5.08 K/UL (ref 1.8–7.3)
PHOSPHATE SERPL-MCNC: 6.5 MG/DL (ref 2.5–4.5)
PLATELET # BLD AUTO: 191 K/UL (ref 130–450)
PMV BLD AUTO: 10.3 FL (ref 7–12)
POTASSIUM SERPL-SCNC: 4.6 MMOL/L (ref 3.5–5)
PROT SERPL-MCNC: 7 G/DL (ref 6.4–8.3)
RBC # BLD AUTO: 2.3 M/UL (ref 3.5–5.5)
SODIUM SERPL-SCNC: 141 MMOL/L (ref 132–146)
TSH SERPL DL<=0.05 MIU/L-ACNC: 3.66 UIU/ML (ref 0.27–4.2)
WBC OTHER # BLD: 6.9 K/UL (ref 4.5–11.5)

## 2025-01-11 PROCEDURE — 84100 ASSAY OF PHOSPHORUS: CPT

## 2025-01-11 PROCEDURE — 2500000003 HC RX 250 WO HCPCS

## 2025-01-11 PROCEDURE — 74018 RADEX ABDOMEN 1 VIEW: CPT

## 2025-01-11 PROCEDURE — 94640 AIRWAY INHALATION TREATMENT: CPT

## 2025-01-11 PROCEDURE — 99222 1ST HOSP IP/OBS MODERATE 55: CPT | Performed by: STUDENT IN AN ORGANIZED HEALTH CARE EDUCATION/TRAINING PROGRAM

## 2025-01-11 PROCEDURE — 80053 COMPREHEN METABOLIC PANEL: CPT

## 2025-01-11 PROCEDURE — 6370000000 HC RX 637 (ALT 250 FOR IP)

## 2025-01-11 PROCEDURE — 2140000000 HC CCU INTERMEDIATE R&B

## 2025-01-11 PROCEDURE — 2500000003 HC RX 250 WO HCPCS: Performed by: INTERNAL MEDICINE

## 2025-01-11 PROCEDURE — 36415 COLL VENOUS BLD VENIPUNCTURE: CPT

## 2025-01-11 PROCEDURE — 90935 HEMODIALYSIS ONE EVALUATION: CPT

## 2025-01-11 PROCEDURE — 6360000002 HC RX W HCPCS: Performed by: INTERNAL MEDICINE

## 2025-01-11 PROCEDURE — 2700000000 HC OXYGEN THERAPY PER DAY

## 2025-01-11 PROCEDURE — 6360000002 HC RX W HCPCS

## 2025-01-11 PROCEDURE — 94660 CPAP INITIATION&MGMT: CPT

## 2025-01-11 PROCEDURE — 6370000000 HC RX 637 (ALT 250 FOR IP): Performed by: INTERNAL MEDICINE

## 2025-01-11 PROCEDURE — 99232 SBSQ HOSP IP/OBS MODERATE 35: CPT | Performed by: INTERNAL MEDICINE

## 2025-01-11 PROCEDURE — 85025 COMPLETE CBC W/AUTO DIFF WBC: CPT

## 2025-01-11 PROCEDURE — 83735 ASSAY OF MAGNESIUM: CPT

## 2025-01-11 PROCEDURE — 84443 ASSAY THYROID STIM HORMONE: CPT

## 2025-01-11 PROCEDURE — 82962 GLUCOSE BLOOD TEST: CPT

## 2025-01-11 RX ORDER — INSULIN LISPRO 100 [IU]/ML
2 INJECTION, SOLUTION INTRAVENOUS; SUBCUTANEOUS ONCE
Status: COMPLETED | OUTPATIENT
Start: 2025-01-11 | End: 2025-01-11

## 2025-01-11 RX ORDER — FLUTICASONE PROPIONATE 50 MCG
2 SPRAY, SUSPENSION (ML) NASAL DAILY
Status: DISCONTINUED | OUTPATIENT
Start: 2025-01-11 | End: 2025-01-15 | Stop reason: HOSPADM

## 2025-01-11 RX ORDER — INSULIN LISPRO 100 [IU]/ML
4 INJECTION, SOLUTION INTRAVENOUS; SUBCUTANEOUS ONCE
Status: DISCONTINUED | OUTPATIENT
Start: 2025-01-11 | End: 2025-01-13 | Stop reason: SDUPTHER

## 2025-01-11 RX ORDER — HEPARIN SODIUM 5000 [USP'U]/ML
5000 INJECTION, SOLUTION INTRAVENOUS; SUBCUTANEOUS EVERY 8 HOURS
Status: DISCONTINUED | OUTPATIENT
Start: 2025-01-11 | End: 2025-01-15 | Stop reason: HOSPADM

## 2025-01-11 RX ORDER — SODIUM CHLORIDE, SODIUM LACTATE, POTASSIUM CHLORIDE, CALCIUM CHLORIDE 600; 310; 30; 20 MG/100ML; MG/100ML; MG/100ML; MG/100ML
INJECTION, SOLUTION INTRAVENOUS CONTINUOUS
Status: DISCONTINUED | OUTPATIENT
Start: 2025-01-13 | End: 2025-01-13

## 2025-01-11 RX ADMIN — INSULIN LISPRO 2 UNITS: 100 INJECTION, SOLUTION INTRAVENOUS; SUBCUTANEOUS at 19:11

## 2025-01-11 RX ADMIN — SUCRALFATE 1 G: 1 TABLET ORAL at 05:29

## 2025-01-11 RX ADMIN — ONDANSETRON 4 MG: 2 INJECTION, SOLUTION INTRAMUSCULAR; INTRAVENOUS at 07:08

## 2025-01-11 RX ADMIN — SUCRALFATE 1 G: 1 TABLET ORAL at 20:18

## 2025-01-11 RX ADMIN — PANTOPRAZOLE SODIUM 40 MG: 40 TABLET, DELAYED RELEASE ORAL at 05:29

## 2025-01-11 RX ADMIN — CARVEDILOL 25 MG: 25 TABLET, FILM COATED ORAL at 08:05

## 2025-01-11 RX ADMIN — HEPARIN SODIUM 5000 UNITS: 5000 INJECTION INTRAVENOUS; SUBCUTANEOUS at 23:39

## 2025-01-11 RX ADMIN — CALCIUM ACETATE 2001 MG: 667 CAPSULE ORAL at 08:08

## 2025-01-11 RX ADMIN — SODIUM CHLORIDE, PRESERVATIVE FREE 10 ML: 5 INJECTION INTRAVENOUS at 08:05

## 2025-01-11 RX ADMIN — ALBUTEROL SULFATE 2.5 MG: 2.5 SOLUTION RESPIRATORY (INHALATION) at 08:57

## 2025-01-11 RX ADMIN — ALBUTEROL SULFATE 2.5 MG: 2.5 SOLUTION RESPIRATORY (INHALATION) at 19:53

## 2025-01-11 RX ADMIN — INSULIN GLARGINE 6 UNITS: 100 INJECTION, SOLUTION SUBCUTANEOUS at 20:18

## 2025-01-11 RX ADMIN — PANTOPRAZOLE SODIUM 40 MG: 40 TABLET, DELAYED RELEASE ORAL at 19:12

## 2025-01-11 RX ADMIN — SODIUM CHLORIDE, PRESERVATIVE FREE 10 ML: 5 INJECTION INTRAVENOUS at 20:19

## 2025-01-11 RX ADMIN — CLOPIDOGREL BISULFATE 75 MG: 75 TABLET ORAL at 08:05

## 2025-01-11 RX ADMIN — CALCIUM ACETATE 2001 MG: 667 CAPSULE ORAL at 12:21

## 2025-01-11 RX ADMIN — INSULIN LISPRO 2 UNITS: 100 INJECTION, SOLUTION INTRAVENOUS; SUBCUTANEOUS at 12:22

## 2025-01-11 RX ADMIN — INSULIN LISPRO 4 UNITS: 100 INJECTION, SOLUTION INTRAVENOUS; SUBCUTANEOUS at 20:18

## 2025-01-11 RX ADMIN — CALCIUM ACETATE 2001 MG: 667 CAPSULE ORAL at 19:12

## 2025-01-11 RX ADMIN — MULTIVITAMIN TABLET 1 TABLET: TABLET at 08:05

## 2025-01-11 RX ADMIN — INSULIN LISPRO 4 UNITS: 100 INJECTION, SOLUTION INTRAVENOUS; SUBCUTANEOUS at 12:22

## 2025-01-11 NOTE — CONSULTS
General Surgery   Consult Note      Patient's Name/Date of Birth: Michelle Nettles / 1985    Date: January 10, 2025     PCP: Rogers Rodríguez MD     Chief Complaint:   Chief Complaint   Patient presents with    Shortness of Breath     Patient c/o SOB and ear pain , states she is having a hard time hearing out of right ear. Also states her HGB is low       HPI:   Michelle Nettles is a 39 y.o. female who was sent and from her dialysis center after CBC showed hemoglobin 6.1.  Patient has a history of DM, HTN, ESRD on hemodialysis MWF and iron infusions for chronic anemia (baseline hemoglobin 8).  She is well-known to the general surgery service and was seen multiple times for anemia and hematochezia.  Last seen in July 2020 for for the same and had an EGD and colonoscopy at that time which showed gastritis, nonobstructing Schatzki ring, internal hemorrhoids, tortuous colon respectively.  General surgery was consulted today for acute on chronic anemia and FOBT positive stool.  Upon evaluation patient states she has been having mild-moderate abdominal pain, diarrhea with black tarry stool x 5 days.  Reports associated decreased appetite for a while and 1 episode of emesis 4 days ago.  Denies hematochezia.  She is not currently taking any PPIs as she finished her course back in July.    In the ED patient is afebrile and tachycardic 107-122, hypertensive, saturating at 99% on 6 L nasal cannula.  Patient is usually on 5 L NC at home.  Labs reviewed with hemoglobin 6.1 s/p 1 u PRBC's with posttransfusion hemoglobin 7.5.    Patient Active Problem List   Diagnosis    Poorly controlled type 1 diabetes mellitus (HCC)    Primary hypertension    Facial cellulitis    Acute hyperkalemia    Nasal polyp    Periorbital cellulitis of right eye    Periorbital cellulitis    Vitamin B deficiency, unspecified    Nasal congestion    Hidradenitis suppurativa    Symptomatic anemia    Normocytic anemia due to blood loss    
OTOLARYNGOLOGY  CONSULT NOTE  2025    Physician Consulted: Dr. Loya  Reason for Consult: Otalgia/hearing loss      HPI  Michelle ORDONEZ Lencho Nettles is a 39 y.o. female who ENT was consulted for evaluation of right ear pain and hearing loss.  Past medical history significant for type 1 diabetes, chronic kidney disease on dialysis, chronic anemia , patient reports symptoms began \"shortly after New Year's\" with pain and associated hearing loss.  Patient denies history of recurrent ear infections, denies dizziness, denies drainage.    Review of Systems   Constitutional:  Negative for activity change, fatigue and fever.   HENT:  Positive for ear pain and hearing loss. Negative for congestion, ear discharge, nosebleeds, postnasal drip, rhinorrhea, sinus pressure, sinus pain, sore throat, tinnitus, trouble swallowing and voice change.    Respiratory:  Negative for cough, choking, wheezing and stridor.    Cardiovascular:  Negative for chest pain.   Gastrointestinal: Negative.    Genitourinary: Negative.    Skin:  Negative for color change and rash.   Neurological:  Negative for speech difficulty, light-headedness, numbness and headaches.   Hematological:  Negative for adenopathy.   Psychiatric/Behavioral:  Negative for behavioral problems.        Past Medical History:   Diagnosis Date    JOLLY (acute kidney injury) ([facility]) 2016    AVF (arteriovenous fistula) ([facility]) 2018    let arm    Chronic kidney disease     Dialysis AV fistula malfunction, initial encounter ([facility]) 2019    DM type 1 (diabetes mellitus, type 1) ([facility])     Encounter regarding vascular access for dialysis for ESRD ([facility]) 2018    ESRD (end stage renal disease) ([facility])     Hemodialysis patient ([facility])     amrita dawson  / graft in left arm    Hypertension     Tobacco abuse 2016       Past Surgical History:   Procedure Laterality Date     SECTION  2013    COLONOSCOPY N/A 2024    COLONOSCOPY DIAGNOSTIC performed by 
ENDOSCOPY    CYST INCISION AND DRAINAGE  8/4/2011    perirectal abscess. [facility]. Dr. Fleming    DIALYSIS FISTULA CREATION Left 11/7/2019    LEFT ARM FISTULAGRAM, BALLOON ANGIOPLASTY performed by Haylee Zaidi MD at [facility] OR    DILATION AND CURETTAGE OF UTERUS  2009    FRACTURE SURGERY  October 11,2012    fracture right ulnar, left tibia, and pelvis    INVASIVE VASCULAR N/A 12/19/2023    Fistulogram left performed by Haylee Zaidi MD at [facility]    INVASIVE VASCULAR Left 12/19/2023    Angioplasty fistula/dialysis circuit performed by Haylee Zaidi MD at [facility]    OTHER SURGICAL HISTORY  1017/12    open reduction internal fixation left radial shaft    OTHER SURGICAL HISTORY  08/19/2016    pericardial window    OTHER SURGICAL HISTORY  08/23/2016     r tesio insertion  / remove 8/16/2018    OTHER SURGICAL HISTORY Left 02/17/2017    insertion a-v fistula left arm    FL ARTERIOVENOUS ANASTOMOSIS OPEN DIRECT Left 7/17/2018    REVISION AV FISTULA - LEFT ARM performed by Haylee Zaidi MD at [facility] OR    FL INSJ NON-TUNNELED CENTRAL VENOUS CATH AGE 5 YR/> N/A 5/10/2018    TESIO CATHETER INSERTION performed by Cornelius Morris MD at [facility] OR    FL VITRECTOMY PARS PLANA REMOVE PRERETINAL MEMBRANE Left 8/28/2018    PARS PLANA VITRECTOMY 25 GAUGE, VITREOUS HEMORRHAGE REMOVAL, ENDO LASER, AIR/FLUID  EXCHANGE LEFT EYE performed by Pierce Fierro MD at [facility] OR    RECTAL SURGERY N/A 1/29/2021    PERINEAL ABCESS INCISION AND DRAINAGE (LITHOTOMY) performed by Dominic Brower MD at [facility] OR    UPPER GASTROINTESTINAL ENDOSCOPY N/A 2/1/2024    EGD ESOPHAGOGASTRODUODENOSCOPY performed by Shira Parker MD at [facility]    UPPER GASTROINTESTINAL ENDOSCOPY N/A 7/18/2024    ESOPHAGOGASTRODUODENOSCOPY BIOPSY performed by Balaji Gonzalez MD at [facility]       Patient Active Problem List   Diagnosis    Poorly controlled type 1 diabetes mellitus (HCC)    Primary

## 2025-01-11 NOTE — PLAN OF CARE
Problem: Chronic Conditions and Co-morbidities  Goal: Patient's chronic conditions and co-morbidity symptoms are monitored and maintained or improved  1/11/2025 1324 by Amara aNgy RN  Outcome: Progressing  1/11/2025 0222 by Sumanth Silva RN  Outcome: Progressing  Flowsheets (Taken 1/11/2025 0128)  Care Plan - Patient's Chronic Conditions and Co-Morbidity Symptoms are Monitored and Maintained or Improved:   Monitor and assess patient's chronic conditions and comorbid symptoms for stability, deterioration, or improvement   Collaborate with multidisciplinary team to address chronic and comorbid conditions and prevent exacerbation or deterioration   Update acute care plan with appropriate goals if chronic or comorbid symptoms are exacerbated and prevent overall improvement and discharge     Problem: Discharge Planning  Goal: Discharge to home or other facility with appropriate resources  1/11/2025 1324 by Amara Nagy RN  Outcome: Progressing  1/11/2025 0222 by Sumanth Silva RN  Outcome: Progressing  Flowsheets (Taken 1/11/2025 0128)  Discharge to home or other facility with appropriate resources:   Identify barriers to discharge with patient and caregiver   Arrange for needed discharge resources and transportation as appropriate   Refer to discharge planning if patient needs post-hospital services based on physician order or complex needs related to functional status, cognitive ability or social support system     Problem: Safety - Adult  Goal: Free from fall injury  1/11/2025 1324 by Amara Nagy RN  Outcome: Progressing  1/11/2025 0222 by Sumanth Silva RN  Outcome: Progressing  Flowsheets (Taken 1/11/2025 0211)  Free From Fall Injury: Instruct family/caregiver on patient safety     Problem: ABCDS Injury Assessment  Goal: Absence of physical injury  1/11/2025 1324 by Amara Nagy RN  Outcome: Progressing  1/11/2025 0222 by Sumanth Silva RN  Outcome: Progressing  Flowsheets

## 2025-01-11 NOTE — FLOWSHEET NOTE
01/11/25 1711   Vital Signs   /61   Temp 98.3 °F (36.8 °C)   Pulse (!) 107   Respirations 18   Weight - Scale 71.4 kg (157 lb 6.5 oz)   Weight Method Estimated   Percent Weight Change -4.03   Pain Assessment   Pain Assessment None - Denies Pain   Post-Hemodialysis Assessment   Post-Treatment Procedures Blood returned;Access bleeding time < 10 minutes   Machine Disinfection Process Acid/Vinegar Clean;Heat Disinfect;Exterior Machine Disinfection   Rinseback Volume (ml) 300 ml   Dialyzer Clearance Lightly streaked   Duration of Treatment (minutes) 240 minutes   Hemodialysis Intake (ml) 300 ml   Hemodialysis Output (ml) 3300 ml   NET Removed (ml) 3000   Tolerated Treatment Good   Patient Response to Treatment tolerated tx well, 3000 net removed, needles pulled, hemostasis achieved, VSS, STstable at dc. post report to Amara Nagy RN   Bilateral Breath Sounds Diminished   Edema Generalized;Facial;Other (Comment)  (ascites)   Facial Edema +2   Time Off 1706   Patient Disposition Return to room   Observations & Evaluations   Level of Consciousness 0   Oriented X 3   Heart Rhythm Regular   Respiratory Quality/Effort Unlabored   O2 Device Nasal cannula   Skin Color Pink   Skin Condition/Temp Dry;Warm

## 2025-01-12 LAB
ABO/RH: NORMAL
ALBUMIN SERPL-MCNC: 3.3 G/DL (ref 3.5–5.2)
ALP SERPL-CCNC: 91 U/L (ref 35–104)
ALT SERPL-CCNC: 8 U/L (ref 0–32)
ANION GAP SERPL CALCULATED.3IONS-SCNC: 10 MMOL/L (ref 7–16)
ANTIBODY SCREEN: NEGATIVE
ARM BAND NUMBER: NORMAL
AST SERPL-CCNC: 12 U/L (ref 0–31)
BASOPHILS # BLD: 0.03 K/UL (ref 0–0.2)
BASOPHILS NFR BLD: 0 % (ref 0–2)
BILIRUB SERPL-MCNC: 0.3 MG/DL (ref 0–1.2)
BLOOD BANK BLOOD PRODUCT EXPIRATION DATE: NORMAL
BLOOD BANK DISPENSE STATUS: NORMAL
BLOOD BANK DISPENSE STATUS: NORMAL
BLOOD BANK ISBT PRODUCT BLOOD TYPE: 5100
BLOOD BANK PRODUCT CODE: NORMAL
BLOOD BANK SAMPLE EXPIRATION: NORMAL
BLOOD BANK UNIT TYPE AND RH: NORMAL
BPU ID: NORMAL
BPU ID: NORMAL
BUN SERPL-MCNC: 22 MG/DL (ref 6–20)
CALCIUM SERPL-MCNC: 8.9 MG/DL (ref 8.6–10.2)
CHLORIDE SERPL-SCNC: 95 MMOL/L (ref 98–107)
CO2 SERPL-SCNC: 33 MMOL/L (ref 22–29)
COMPONENT: NORMAL
COMPONENT: NORMAL
CREAT SERPL-MCNC: 5.7 MG/DL (ref 0.5–1)
CROSSMATCH RESULT: NORMAL
CROSSMATCH RESULT: NORMAL
EOSINOPHIL # BLD: 0.21 K/UL (ref 0.05–0.5)
EOSINOPHILS RELATIVE PERCENT: 3 % (ref 0–6)
ERYTHROCYTE [DISTWIDTH] IN BLOOD BY AUTOMATED COUNT: 16.5 % (ref 11.5–15)
GFR, ESTIMATED: 9 ML/MIN/1.73M2
GLUCOSE BLD-MCNC: 112 MG/DL (ref 74–99)
GLUCOSE BLD-MCNC: 235 MG/DL (ref 74–99)
GLUCOSE BLD-MCNC: 357 MG/DL (ref 74–99)
GLUCOSE BLD-MCNC: >500 MG/DL (ref 74–99)
GLUCOSE BLD-MCNC: >500 MG/DL (ref 74–99)
GLUCOSE SERPL-MCNC: 115 MG/DL (ref 74–99)
GLUCOSE SERPL-MCNC: 499 MG/DL (ref 74–99)
HCT VFR BLD AUTO: 20.9 % (ref 34–48)
HCT VFR BLD AUTO: 21.2 % (ref 34–48)
HGB BLD-MCNC: 6.8 G/DL (ref 11.5–15.5)
HGB BLD-MCNC: 7.1 G/DL (ref 11.5–15.5)
IMM GRANULOCYTES # BLD AUTO: 0.04 K/UL (ref 0–0.58)
IMM GRANULOCYTES NFR BLD: 1 % (ref 0–5)
LYMPHOCYTES NFR BLD: 0.92 K/UL (ref 1.5–4)
LYMPHOCYTES RELATIVE PERCENT: 14 % (ref 20–42)
MAGNESIUM SERPL-MCNC: 1.9 MG/DL (ref 1.6–2.6)
MCH RBC QN AUTO: 32.4 PG (ref 26–35)
MCHC RBC AUTO-ENTMCNC: 33.5 G/DL (ref 32–34.5)
MCV RBC AUTO: 96.8 FL (ref 80–99.9)
MONOCYTES NFR BLD: 0.65 K/UL (ref 0.1–0.95)
MONOCYTES NFR BLD: 10 % (ref 2–12)
NEUTROPHILS NFR BLD: 73 % (ref 43–80)
NEUTS SEG NFR BLD: 4.89 K/UL (ref 1.8–7.3)
PHOSPHATE SERPL-MCNC: 4.3 MG/DL (ref 2.5–4.5)
PLATELET # BLD AUTO: 204 K/UL (ref 130–450)
PMV BLD AUTO: 10.4 FL (ref 7–12)
POTASSIUM SERPL-SCNC: 4.2 MMOL/L (ref 3.5–5)
PROT SERPL-MCNC: 6.7 G/DL (ref 6.4–8.3)
RBC # BLD AUTO: 2.19 M/UL (ref 3.5–5.5)
SODIUM SERPL-SCNC: 138 MMOL/L (ref 132–146)
TRANSFUSION STATUS: NORMAL
TRANSFUSION STATUS: NORMAL
UNIT DIVISION: 0
UNIT DIVISION: 0
UNIT ISSUE DATE/TIME: NORMAL
WBC OTHER # BLD: 6.7 K/UL (ref 4.5–11.5)

## 2025-01-12 PROCEDURE — 5A0945A ASSISTANCE WITH RESPIRATORY VENTILATION, 24-96 CONSECUTIVE HOURS, HIGH NASAL FLOW/VELOCITY: ICD-10-PCS | Performed by: STUDENT IN AN ORGANIZED HEALTH CARE EDUCATION/TRAINING PROGRAM

## 2025-01-12 PROCEDURE — 94640 AIRWAY INHALATION TREATMENT: CPT

## 2025-01-12 PROCEDURE — 83735 ASSAY OF MAGNESIUM: CPT

## 2025-01-12 PROCEDURE — 82947 ASSAY GLUCOSE BLOOD QUANT: CPT

## 2025-01-12 PROCEDURE — 84100 ASSAY OF PHOSPHORUS: CPT

## 2025-01-12 PROCEDURE — 6370000000 HC RX 637 (ALT 250 FOR IP)

## 2025-01-12 PROCEDURE — 86900 BLOOD TYPING SEROLOGIC ABO: CPT

## 2025-01-12 PROCEDURE — 36430 TRANSFUSION BLD/BLD COMPNT: CPT

## 2025-01-12 PROCEDURE — 99232 SBSQ HOSP IP/OBS MODERATE 35: CPT | Performed by: INTERNAL MEDICINE

## 2025-01-12 PROCEDURE — 6360000002 HC RX W HCPCS: Performed by: INTERNAL MEDICINE

## 2025-01-12 PROCEDURE — 2140000000 HC CCU INTERMEDIATE R&B

## 2025-01-12 PROCEDURE — 6360000002 HC RX W HCPCS

## 2025-01-12 PROCEDURE — 85025 COMPLETE CBC W/AUTO DIFF WBC: CPT

## 2025-01-12 PROCEDURE — 2500000003 HC RX 250 WO HCPCS: Performed by: INTERNAL MEDICINE

## 2025-01-12 PROCEDURE — 2500000003 HC RX 250 WO HCPCS

## 2025-01-12 PROCEDURE — 82962 GLUCOSE BLOOD TEST: CPT

## 2025-01-12 PROCEDURE — 80053 COMPREHEN METABOLIC PANEL: CPT

## 2025-01-12 PROCEDURE — 86901 BLOOD TYPING SEROLOGIC RH(D): CPT

## 2025-01-12 PROCEDURE — 6370000000 HC RX 637 (ALT 250 FOR IP): Performed by: INTERNAL MEDICINE

## 2025-01-12 PROCEDURE — 86923 COMPATIBILITY TEST ELECTRIC: CPT

## 2025-01-12 PROCEDURE — 85018 HEMOGLOBIN: CPT

## 2025-01-12 PROCEDURE — 99232 SBSQ HOSP IP/OBS MODERATE 35: CPT | Performed by: STUDENT IN AN ORGANIZED HEALTH CARE EDUCATION/TRAINING PROGRAM

## 2025-01-12 PROCEDURE — 36415 COLL VENOUS BLD VENIPUNCTURE: CPT

## 2025-01-12 PROCEDURE — 2700000000 HC OXYGEN THERAPY PER DAY

## 2025-01-12 PROCEDURE — 85014 HEMATOCRIT: CPT

## 2025-01-12 PROCEDURE — P9016 RBC LEUKOCYTES REDUCED: HCPCS

## 2025-01-12 PROCEDURE — 86850 RBC ANTIBODY SCREEN: CPT

## 2025-01-12 RX ORDER — SODIUM CHLORIDE 9 MG/ML
INJECTION, SOLUTION INTRAVENOUS PRN
Status: DISCONTINUED | OUTPATIENT
Start: 2025-01-12 | End: 2025-01-15 | Stop reason: HOSPADM

## 2025-01-12 RX ORDER — INSULIN LISPRO 100 [IU]/ML
8 INJECTION, SOLUTION INTRAVENOUS; SUBCUTANEOUS ONCE
Status: COMPLETED | OUTPATIENT
Start: 2025-01-12 | End: 2025-01-12

## 2025-01-12 RX ORDER — SENNA AND DOCUSATE SODIUM 50; 8.6 MG/1; MG/1
TABLET, FILM COATED ORAL 2 TIMES DAILY
Status: DISCONTINUED | OUTPATIENT
Start: 2025-01-12 | End: 2025-01-15 | Stop reason: HOSPADM

## 2025-01-12 RX ORDER — INSULIN LISPRO 100 [IU]/ML
0-8 INJECTION, SOLUTION INTRAVENOUS; SUBCUTANEOUS
Status: DISCONTINUED | OUTPATIENT
Start: 2025-01-12 | End: 2025-01-15 | Stop reason: HOSPADM

## 2025-01-12 RX ORDER — PANTOPRAZOLE SODIUM 40 MG/10ML
40 INJECTION, POWDER, LYOPHILIZED, FOR SOLUTION INTRAVENOUS 2 TIMES DAILY
Status: DISCONTINUED | OUTPATIENT
Start: 2025-01-12 | End: 2025-01-15 | Stop reason: HOSPADM

## 2025-01-12 RX ORDER — NEPHROCAP 1 MG
1 CAP ORAL DAILY
Status: DISCONTINUED | OUTPATIENT
Start: 2025-01-12 | End: 2025-01-15 | Stop reason: HOSPADM

## 2025-01-12 RX ADMIN — INSULIN LISPRO 8 UNITS: 100 INJECTION, SOLUTION INTRAVENOUS; SUBCUTANEOUS at 21:40

## 2025-01-12 RX ADMIN — MULTIVITAMIN TABLET 1 TABLET: TABLET at 08:26

## 2025-01-12 RX ADMIN — INSULIN LISPRO 1 UNITS: 100 INJECTION, SOLUTION INTRAVENOUS; SUBCUTANEOUS at 12:16

## 2025-01-12 RX ADMIN — SUCRALFATE 1 G: 1 TABLET ORAL at 21:30

## 2025-01-12 RX ADMIN — SALINE NASAL SPRAY 1 SPRAY: 1.5 SOLUTION NASAL at 08:26

## 2025-01-12 RX ADMIN — ONDANSETRON 4 MG: 2 INJECTION, SOLUTION INTRAMUSCULAR; INTRAVENOUS at 12:17

## 2025-01-12 RX ADMIN — PANTOPRAZOLE SODIUM 40 MG: 40 TABLET, DELAYED RELEASE ORAL at 05:43

## 2025-01-12 RX ADMIN — INSULIN LISPRO 8 UNITS: 100 INJECTION, SOLUTION INTRAVENOUS; SUBCUTANEOUS at 18:08

## 2025-01-12 RX ADMIN — PANTOPRAZOLE SODIUM 40 MG: 40 INJECTION, POWDER, FOR SOLUTION INTRAVENOUS at 21:30

## 2025-01-12 RX ADMIN — CARVEDILOL 25 MG: 25 TABLET, FILM COATED ORAL at 08:26

## 2025-01-12 RX ADMIN — INSULIN LISPRO 4 UNITS: 100 INJECTION, SOLUTION INTRAVENOUS; SUBCUTANEOUS at 18:08

## 2025-01-12 RX ADMIN — ALBUTEROL SULFATE 2.5 MG: 2.5 SOLUTION RESPIRATORY (INHALATION) at 08:41

## 2025-01-12 RX ADMIN — ALBUTEROL SULFATE 2.5 MG: 2.5 SOLUTION RESPIRATORY (INHALATION) at 16:11

## 2025-01-12 RX ADMIN — INSULIN GLARGINE 6 UNITS: 100 INJECTION, SOLUTION SUBCUTANEOUS at 21:30

## 2025-01-12 RX ADMIN — SODIUM CHLORIDE, PRESERVATIVE FREE 10 ML: 5 INJECTION INTRAVENOUS at 08:27

## 2025-01-12 RX ADMIN — SUCRALFATE 1 G: 1 TABLET ORAL at 05:43

## 2025-01-12 RX ADMIN — SENNOSIDES AND DOCUSATE SODIUM 1 TABLET: 50; 8.6 TABLET ORAL at 21:30

## 2025-01-12 RX ADMIN — FLUTICASONE PROPIONATE 2 SPRAY: 50 SPRAY, METERED NASAL at 12:16

## 2025-01-12 RX ADMIN — SODIUM CHLORIDE, PRESERVATIVE FREE 10 ML: 5 INJECTION INTRAVENOUS at 21:30

## 2025-01-12 RX ADMIN — CALCIUM ACETATE 2001 MG: 667 CAPSULE ORAL at 08:26

## 2025-01-12 NOTE — PLAN OF CARE
Problem: Chronic Conditions and Co-morbidities  Goal: Patient's chronic conditions and co-morbidity symptoms are monitored and maintained or improved  Outcome: Progressing     Problem: Discharge Planning  Goal: Discharge to home or other facility with appropriate resources  1/12/2025 1322 by Amara Nagy RN  Outcome: Progressing  1/12/2025 0032 by Janelle Doan RN  Outcome: Progressing     Problem: Safety - Adult  Goal: Free from fall injury  1/12/2025 1322 by Amara Nagy RN  Outcome: Progressing  1/12/2025 0032 by Janelle Doan RN  Outcome: Progressing     Problem: ABCDS Injury Assessment  Goal: Absence of physical injury  1/12/2025 1322 by Amara Nagy RN  Outcome: Progressing  1/12/2025 0032 by Janelle Doan RN  Outcome: Progressing

## 2025-01-12 NOTE — PLAN OF CARE
Problem: Chronic Conditions and Co-morbidities  Goal: Patient's chronic conditions and co-morbidity symptoms are monitored and maintained or improved  1/11/2025 1324 by Amara Nagy RN  Outcome: Progressing     Problem: Discharge Planning  Goal: Discharge to home or other facility with appropriate resources  1/12/2025 0032 by Janelle Doan RN  Outcome: Progressing  1/11/2025 1324 by Amara Nagy RN  Outcome: Progressing     Problem: Safety - Adult  Goal: Free from fall injury  1/12/2025 0032 by aJnelle Doan RN  Outcome: Progressing  1/11/2025 1324 by Amara Nagy RN  Outcome: Progressing     Problem: ABCDS Injury Assessment  Goal: Absence of physical injury  1/12/2025 0032 by Janelle Doan RN  Outcome: Progressing  1/11/2025 1324 by Amara Nagy RN  Outcome: Progressing

## 2025-01-13 ENCOUNTER — ANESTHESIA EVENT (OUTPATIENT)
Dept: ENDOSCOPY | Age: 40
DRG: 291 | End: 2025-01-13
Payer: MEDICARE

## 2025-01-13 ENCOUNTER — ANESTHESIA (OUTPATIENT)
Dept: ENDOSCOPY | Age: 40
DRG: 291 | End: 2025-01-13
Payer: MEDICARE

## 2025-01-13 LAB
ABO/RH: NORMAL
ALBUMIN SERPL-MCNC: 3.4 G/DL (ref 3.5–5.2)
ALP SERPL-CCNC: 91 U/L (ref 35–104)
ALT SERPL-CCNC: 10 U/L (ref 0–32)
ANION GAP SERPL CALCULATED.3IONS-SCNC: 5 MMOL/L (ref 7–16)
ANTIBODY SCREEN: NEGATIVE
ARM BAND NUMBER: NORMAL
AST SERPL-CCNC: 15 U/L (ref 0–31)
BASOPHILS # BLD: 0.02 K/UL (ref 0–0.2)
BASOPHILS NFR BLD: 0 % (ref 0–2)
BILIRUB SERPL-MCNC: 0.9 MG/DL (ref 0–1.2)
BLOOD BANK BLOOD PRODUCT EXPIRATION DATE: NORMAL
BLOOD BANK DISPENSE STATUS: NORMAL
BLOOD BANK ISBT PRODUCT BLOOD TYPE: 5100
BLOOD BANK PRODUCT CODE: NORMAL
BLOOD BANK SAMPLE EXPIRATION: NORMAL
BLOOD BANK UNIT TYPE AND RH: NORMAL
BPU ID: NORMAL
BUN SERPL-MCNC: 36 MG/DL (ref 6–20)
CALCIUM SERPL-MCNC: 9.1 MG/DL (ref 8.6–10.2)
CHLORIDE SERPL-SCNC: 94 MMOL/L (ref 98–107)
CO2 SERPL-SCNC: 36 MMOL/L (ref 22–29)
COMPONENT: NORMAL
CREAT SERPL-MCNC: 8.1 MG/DL (ref 0.5–1)
CROSSMATCH RESULT: NORMAL
EOSINOPHIL # BLD: 0.19 K/UL (ref 0.05–0.5)
EOSINOPHILS RELATIVE PERCENT: 3 % (ref 0–6)
ERYTHROCYTE [DISTWIDTH] IN BLOOD BY AUTOMATED COUNT: 15.8 % (ref 11.5–15)
GFR, ESTIMATED: 6 ML/MIN/1.73M2
GLUCOSE BLD-MCNC: 195 MG/DL (ref 74–99)
GLUCOSE BLD-MCNC: 218 MG/DL (ref 74–99)
GLUCOSE BLD-MCNC: 425 MG/DL (ref 74–99)
GLUCOSE BLD-MCNC: 98 MG/DL (ref 74–99)
GLUCOSE SERPL-MCNC: 95 MG/DL (ref 74–99)
HCT VFR BLD AUTO: 24.4 % (ref 34–48)
HGB BLD-MCNC: 8.2 G/DL (ref 11.5–15.5)
IMM GRANULOCYTES # BLD AUTO: <0.03 K/UL (ref 0–0.58)
IMM GRANULOCYTES NFR BLD: 0 % (ref 0–5)
LYMPHOCYTES NFR BLD: 1.3 K/UL (ref 1.5–4)
LYMPHOCYTES RELATIVE PERCENT: 20 % (ref 20–42)
MAGNESIUM SERPL-MCNC: 2 MG/DL (ref 1.6–2.6)
MCH RBC QN AUTO: 31.7 PG (ref 26–35)
MCHC RBC AUTO-ENTMCNC: 33.6 G/DL (ref 32–34.5)
MCV RBC AUTO: 94.2 FL (ref 80–99.9)
MONOCYTES NFR BLD: 0.53 K/UL (ref 0.1–0.95)
MONOCYTES NFR BLD: 8 % (ref 2–12)
NEUTROPHILS NFR BLD: 68 % (ref 43–80)
NEUTS SEG NFR BLD: 4.42 K/UL (ref 1.8–7.3)
PHOSPHATE SERPL-MCNC: 4.6 MG/DL (ref 2.5–4.5)
PLATELET # BLD AUTO: 196 K/UL (ref 130–450)
PMV BLD AUTO: 10 FL (ref 7–12)
POTASSIUM SERPL-SCNC: 4.7 MMOL/L (ref 3.5–5)
PROT SERPL-MCNC: 6.9 G/DL (ref 6.4–8.3)
RBC # BLD AUTO: 2.59 M/UL (ref 3.5–5.5)
SODIUM SERPL-SCNC: 135 MMOL/L (ref 132–146)
TRANSFUSION STATUS: NORMAL
UNIT DIVISION: 0
UNIT ISSUE DATE/TIME: NORMAL
WBC OTHER # BLD: 6.5 K/UL (ref 4.5–11.5)

## 2025-01-13 PROCEDURE — 0DB68ZX EXCISION OF STOMACH, VIA NATURAL OR ARTIFICIAL OPENING ENDOSCOPIC, DIAGNOSTIC: ICD-10-PCS | Performed by: SURGERY

## 2025-01-13 PROCEDURE — 3700000000 HC ANESTHESIA ATTENDED CARE: Performed by: SURGERY

## 2025-01-13 PROCEDURE — 36415 COLL VENOUS BLD VENIPUNCTURE: CPT

## 2025-01-13 PROCEDURE — 6370000000 HC RX 637 (ALT 250 FOR IP)

## 2025-01-13 PROCEDURE — 90935 HEMODIALYSIS ONE EVALUATION: CPT

## 2025-01-13 PROCEDURE — 7100000001 HC PACU RECOVERY - ADDTL 15 MIN: Performed by: SURGERY

## 2025-01-13 PROCEDURE — 6360000002 HC RX W HCPCS: Performed by: STUDENT IN AN ORGANIZED HEALTH CARE EDUCATION/TRAINING PROGRAM

## 2025-01-13 PROCEDURE — 7100000000 HC PACU RECOVERY - FIRST 15 MIN: Performed by: SURGERY

## 2025-01-13 PROCEDURE — 6370000000 HC RX 637 (ALT 250 FOR IP): Performed by: STUDENT IN AN ORGANIZED HEALTH CARE EDUCATION/TRAINING PROGRAM

## 2025-01-13 PROCEDURE — 3609012400 HC EGD TRANSORAL BIOPSY SINGLE/MULTIPLE: Performed by: SURGERY

## 2025-01-13 PROCEDURE — 84100 ASSAY OF PHOSPHORUS: CPT

## 2025-01-13 PROCEDURE — 6360000002 HC RX W HCPCS: Performed by: INTERNAL MEDICINE

## 2025-01-13 PROCEDURE — 6370000000 HC RX 637 (ALT 250 FOR IP): Performed by: INTERNAL MEDICINE

## 2025-01-13 PROCEDURE — 3700000001 HC ADD 15 MINUTES (ANESTHESIA): Performed by: SURGERY

## 2025-01-13 PROCEDURE — 94640 AIRWAY INHALATION TREATMENT: CPT

## 2025-01-13 PROCEDURE — 80053 COMPREHEN METABOLIC PANEL: CPT

## 2025-01-13 PROCEDURE — 2580000003 HC RX 258

## 2025-01-13 PROCEDURE — 83735 ASSAY OF MAGNESIUM: CPT

## 2025-01-13 PROCEDURE — 2700000000 HC OXYGEN THERAPY PER DAY

## 2025-01-13 PROCEDURE — 88305 TISSUE EXAM BY PATHOLOGIST: CPT

## 2025-01-13 PROCEDURE — 2500000003 HC RX 250 WO HCPCS

## 2025-01-13 PROCEDURE — 2500000003 HC RX 250 WO HCPCS: Performed by: STUDENT IN AN ORGANIZED HEALTH CARE EDUCATION/TRAINING PROGRAM

## 2025-01-13 PROCEDURE — 6360000002 HC RX W HCPCS

## 2025-01-13 PROCEDURE — 2500000003 HC RX 250 WO HCPCS: Performed by: INTERNAL MEDICINE

## 2025-01-13 PROCEDURE — 2709999900 HC NON-CHARGEABLE SUPPLY: Performed by: SURGERY

## 2025-01-13 PROCEDURE — 82962 GLUCOSE BLOOD TEST: CPT

## 2025-01-13 PROCEDURE — 2140000000 HC CCU INTERMEDIATE R&B

## 2025-01-13 PROCEDURE — 85025 COMPLETE CBC W/AUTO DIFF WBC: CPT

## 2025-01-13 RX ORDER — INSULIN LISPRO 100 [IU]/ML
4 INJECTION, SOLUTION INTRAVENOUS; SUBCUTANEOUS ONCE
Status: COMPLETED | OUTPATIENT
Start: 2025-01-13 | End: 2025-01-13

## 2025-01-13 RX ORDER — PROPOFOL 10 MG/ML
INJECTION, EMULSION INTRAVENOUS
Status: DISCONTINUED | OUTPATIENT
Start: 2025-01-13 | End: 2025-01-13 | Stop reason: SDUPTHER

## 2025-01-13 RX ORDER — LIDOCAINE HYDROCHLORIDE 20 MG/ML
INJECTION, SOLUTION INTRAVENOUS
Status: DISCONTINUED | OUTPATIENT
Start: 2025-01-13 | End: 2025-01-13 | Stop reason: SDUPTHER

## 2025-01-13 RX ADMIN — SUCRALFATE 1 G: 1 TABLET ORAL at 16:46

## 2025-01-13 RX ADMIN — SENNOSIDES AND DOCUSATE SODIUM 1 TABLET: 50; 8.6 TABLET ORAL at 21:42

## 2025-01-13 RX ADMIN — SUCRALFATE 1 G: 1 TABLET ORAL at 21:41

## 2025-01-13 RX ADMIN — INSULIN LISPRO 2 UNITS: 100 INJECTION, SOLUTION INTRAVENOUS; SUBCUTANEOUS at 17:50

## 2025-01-13 RX ADMIN — PANTOPRAZOLE SODIUM 40 MG: 40 INJECTION, POWDER, FOR SOLUTION INTRAVENOUS at 10:30

## 2025-01-13 RX ADMIN — SENNOSIDES AND DOCUSATE SODIUM 1 TABLET: 50; 8.6 TABLET ORAL at 10:31

## 2025-01-13 RX ADMIN — SODIUM CHLORIDE, PRESERVATIVE FREE 10 ML: 5 INJECTION INTRAVENOUS at 21:42

## 2025-01-13 RX ADMIN — INSULIN LISPRO 8 UNITS: 100 INJECTION, SOLUTION INTRAVENOUS; SUBCUTANEOUS at 21:41

## 2025-01-13 RX ADMIN — HEPARIN SODIUM 5000 UNITS: 5000 INJECTION INTRAVENOUS; SUBCUTANEOUS at 16:46

## 2025-01-13 RX ADMIN — CALCIUM ACETATE 2001 MG: 667 CAPSULE ORAL at 10:30

## 2025-01-13 RX ADMIN — INSULIN LISPRO 4 UNITS: 100 INJECTION, SOLUTION INTRAVENOUS; SUBCUTANEOUS at 21:42

## 2025-01-13 RX ADMIN — CALCIUM ACETATE 2001 MG: 667 CAPSULE ORAL at 16:45

## 2025-01-13 RX ADMIN — PANTOPRAZOLE SODIUM 40 MG: 40 INJECTION, POWDER, FOR SOLUTION INTRAVENOUS at 21:41

## 2025-01-13 RX ADMIN — INSULIN GLARGINE 6 UNITS: 100 INJECTION, SOLUTION SUBCUTANEOUS at 21:41

## 2025-01-13 RX ADMIN — FLUTICASONE PROPIONATE 2 SPRAY: 50 SPRAY, METERED NASAL at 10:31

## 2025-01-13 RX ADMIN — Medication 1 MG: at 10:30

## 2025-01-13 RX ADMIN — CARVEDILOL 25 MG: 25 TABLET, FILM COATED ORAL at 10:31

## 2025-01-13 RX ADMIN — SUCRALFATE 1 G: 1 TABLET ORAL at 10:30

## 2025-01-13 RX ADMIN — ALBUTEROL SULFATE 2.5 MG: 2.5 SOLUTION RESPIRATORY (INHALATION) at 11:13

## 2025-01-13 RX ADMIN — LIDOCAINE HYDROCHLORIDE 100 MG: 20 INJECTION, SOLUTION INTRAVENOUS at 08:49

## 2025-01-13 RX ADMIN — SODIUM CHLORIDE, PRESERVATIVE FREE 10 ML: 5 INJECTION INTRAVENOUS at 10:30

## 2025-01-13 RX ADMIN — INSULIN LISPRO 2 UNITS: 100 INJECTION, SOLUTION INTRAVENOUS; SUBCUTANEOUS at 11:47

## 2025-01-13 RX ADMIN — PROPOFOL 120 MG: 10 INJECTION, EMULSION INTRAVENOUS at 08:49

## 2025-01-13 RX ADMIN — SODIUM CHLORIDE, SODIUM LACTATE, POTASSIUM CHLORIDE, AND CALCIUM CHLORIDE: .6; .31; .03; .02 INJECTION, SOLUTION INTRAVENOUS at 02:12

## 2025-01-13 ASSESSMENT — LIFESTYLE VARIABLES: SMOKING_STATUS: 1

## 2025-01-13 ASSESSMENT — ENCOUNTER SYMPTOMS: SHORTNESS OF BREATH: 1

## 2025-01-13 ASSESSMENT — PAIN - FUNCTIONAL ASSESSMENT: PAIN_FUNCTIONAL_ASSESSMENT: NONE - DENIES PAIN

## 2025-01-13 NOTE — PLAN OF CARE
Problem: Chronic Conditions and Co-morbidities  Goal: Patient's chronic conditions and co-morbidity symptoms are monitored and maintained or improved  Outcome: Progressing     Problem: Discharge Planning  Goal: Discharge to home or other facility with appropriate resources  Outcome: Progressing     Problem: Safety - Adult  Goal: Free from fall injury  Outcome: Progressing  Flowsheets (Taken 1/13/2025 1126)  Free From Fall Injury: Instruct family/caregiver on patient safety

## 2025-01-13 NOTE — ANESTHESIA PRE PROCEDURE
dressing changes  Patient not taking: Reported on 1/10/2025 8/7/24   Krystyna Salcido MD   sodium chloride (OCEAN, BABY AYR) 0.65 % nasal spray 1 spray by Nasal route every 2 hours as needed for Congestion 8/7/24 9/6/24  Krystyna Salcido MD   Benzoyl Peroxide (BENZAC AC) 10 % external wash Apply 1 Application topically every morning Apply to face    Adrianne Rosales MD   OXYGEN Inhale 3 L into the lungs continuous    Adrianne Rosales MD   Probiotic Product (PROBIOTIC DAILY) CAPS Take 1 tablet by mouth daily  Patient not taking: Reported on 1/10/2025    ProviderAdrianne MD       Current medications:    Current Facility-Administered Medications   Medication Dose Route Frequency Provider Last Rate Last Admin    Virt-Caps 1 mg  1 capsule Oral Daily Brynn Perez APRN - CNP        sennosides-docusate sodium (SENOKOT-S) 8.6-50 MG tablet   Oral BID Krystyna Salcido MD   1 tablet at 01/12/25 2130    pantoprazole (PROTONIX) injection 40 mg  40 mg IntraVENous BID Krystyna Salcido MD   40 mg at 01/12/25 2130    insulin lispro (HUMALOG,ADMELOG) injection vial 0-8 Units  0-8 Units SubCUTAneous 4x Daily AC & HS Krystyna Salcido MD   8 Units at 01/12/25 2140    0.9 % sodium chloride infusion   IntraVENous PRN Krystyna Salcido MD        lactated ringers infusion   IntraVENous Continuous Brynn Perez APRN - CNP 50 mL/hr at 01/13/25 0720 NoRateChange at 01/13/25 0720    fluticasone (FLONASE) 50 MCG/ACT nasal spray 2 spray  2 spray Each Nostril Daily Beltrán, Dorian, DO   2 spray at 01/12/25 1216    [Held by provider] heparin (porcine) injection 5,000 Units  5,000 Units SubCUTAneous Q8H Debbie Ansari MD   5,000 Units at 01/11/25 2339    insulin lispro (HUMALOG,ADMELOG) injection vial 4 Units  4 Units SubCUTAneous Once Trent Levin APRN - CNP        sodium chloride flush 0.9 % injection 5-40 mL  5-40 mL IntraVENous 2 times per day Trent Levin APRN - CNP   10 mL at 01/12/25 2130    sodium chloride flush 0.9 %

## 2025-01-13 NOTE — CARE COORDINATION
Social Work/ Case Management Transition of Care Planning (Jada Liu, [phone]):     Pt presented to the hospital with SOB and ear pain. SW attempted to meet with Pt who was off the floor for EGD w/biopsy. Per chart review Pt lives with her daughter who is WC bound, in a one story house.  Pt's PCP is Dr. Lawrence and pharmacy is [address] Pt has home O2 through [employer]. Pt gets HD at [facility] T/TH/SAT @ 11am chairtime. Pt uses insurance for transportation.     Pt is on 4L NC. Pt on IV Protonix 2x daily. PT/OT pending. Pending US Abdomen. Hgb 8.2 after transfusion of RBC. Pt BUN 36, Creatinine 8.1. SW will discuss discharge plan and complete assessment upon Pt's return to floor. JOEY/TELMA to follow.  Jada Liu, MARCELO  1/13/2025

## 2025-01-13 NOTE — CONSENT
Informed Consent for Blood Component Transfusion Note    I have discussed with the patient the rationale for blood component transfusion; its benefits in treating or preventing fatigue, organ damage, or death; and its risk which includes mild transfusion reactions, rare risk of blood borne infection, or more serious but rare reactions. I have discussed the alternatives to transfusion, including the risk and consequences of not receiving transfusion. The patient had an opportunity to ask questions and had agreed to proceed with transfusion of blood components.    Electronically signed by Krystyna Salcido MD on 1/13/25 at 12:18 PM EST

## 2025-01-13 NOTE — FLOWSHEET NOTE
01/13/25 1815   Vital Signs   BP (!) 156/76   Temp 96.9 °F (36.1 °C)   Pulse 91   Respirations 18   Weight - Scale 72.8 kg (160 lb 7.9 oz)   Percent Weight Change -3.95   Post-Hemodialysis Assessment   Machine Disinfection Process Acid/Vinegar Clean;Heat Disinfect   Rinseback Volume (ml) 300 ml   Blood Volume Processed (Liters) 84.4 L   Dialyzer Clearance Lightly streaked   Duration of Treatment (minutes) 240 minutes   Heparin Amount Administered During Treatment (mL) 0 mL   Hemodialysis Intake (ml) 300 ml   Hemodialysis Output (ml) 3300 ml   NET Removed (ml) 3000   Edema Generalized;Facial

## 2025-01-13 NOTE — ANESTHESIA POSTPROCEDURE EVALUATION
Department of Anesthesiology  Postprocedure Note    Patient: Michelle Nettles  MRN: [id number]  YOB: 1985  Date of evaluation: 1/13/2025    Procedure Summary       Date: 01/13/25 Room / Location: [place] [address] / [facility]    Anesthesia Start: 0837 Anesthesia Stop: 0857    Procedure: ESOPHAGOGASTRODUODENOSCOPY BIOPSY Diagnosis:       Anemia, unspecified type      (Anemia, unspecified type [D64.9])    Surgeons: Balaji Gonzalez MD Responsible Provider: Emely Quinonez MD    Anesthesia Type: MAC ASA Status: 4            Anesthesia Type: No value filed.    David Phase I:      David Phase II:      Anesthesia Post Evaluation    Patient location during evaluation: PACU  Patient participation: complete - patient participated  Level of consciousness: awake and alert  Pain score: 0  Airway patency: patent  Nausea & Vomiting: no nausea and no vomiting  Cardiovascular status: blood pressure returned to baseline  Respiratory status: acceptable  Hydration status: euvolemic        No notable events documented.

## 2025-01-13 NOTE — PLAN OF CARE
Problem: Discharge Planning  Goal: Discharge to home or other facility with appropriate resources  1/12/2025 1322 by Amara Nagy RN  Outcome: Progressing     Problem: Safety - Adult  Goal: Free from fall injury  1/12/2025 2336 by Janelle Doan RN  Outcome: Progressing  1/12/2025 1322 by Amara Nagy RN  Outcome: Progressing     Problem: ABCDS Injury Assessment  Goal: Absence of physical injury  1/12/2025 2336 by Janelle Doan RN  Outcome: Progressing  1/12/2025 1322 by Amara Nagy RN  Outcome: Progressing

## 2025-01-14 ENCOUNTER — APPOINTMENT (OUTPATIENT)
Dept: ULTRASOUND IMAGING | Age: 40
DRG: 291 | End: 2025-01-14
Payer: MEDICARE

## 2025-01-14 PROBLEM — I10 PRIMARY HYPERTENSION: Status: RESOLVED | Noted: 2017-10-17 | Resolved: 2025-01-14

## 2025-01-14 LAB
ALBUMIN SERPL-MCNC: 3.3 G/DL (ref 3.5–5.2)
ALP SERPL-CCNC: 89 U/L (ref 35–104)
ALT SERPL-CCNC: 9 U/L (ref 0–32)
ANION GAP SERPL CALCULATED.3IONS-SCNC: 10 MMOL/L (ref 7–16)
AST SERPL-CCNC: 17 U/L (ref 0–31)
BASOPHILS # BLD: 0.02 K/UL (ref 0–0.2)
BASOPHILS NFR BLD: 0 % (ref 0–2)
BILIRUB SERPL-MCNC: 0.3 MG/DL (ref 0–1.2)
BUN SERPL-MCNC: 27 MG/DL (ref 6–20)
CALCIUM SERPL-MCNC: 9 MG/DL (ref 8.6–10.2)
CHLORIDE SERPL-SCNC: 93 MMOL/L (ref 98–107)
CO2 SERPL-SCNC: 29 MMOL/L (ref 22–29)
CREAT SERPL-MCNC: 5.1 MG/DL (ref 0.5–1)
EOSINOPHIL # BLD: 0.25 K/UL (ref 0.05–0.5)
EOSINOPHILS RELATIVE PERCENT: 4 % (ref 0–6)
ERYTHROCYTE [DISTWIDTH] IN BLOOD BY AUTOMATED COUNT: 16.7 % (ref 11.5–15)
GFR, ESTIMATED: 10 ML/MIN/1.73M2
GLUCOSE BLD-MCNC: 210 MG/DL (ref 74–99)
GLUCOSE BLD-MCNC: 226 MG/DL (ref 74–99)
GLUCOSE BLD-MCNC: 273 MG/DL (ref 74–99)
GLUCOSE BLD-MCNC: 333 MG/DL (ref 74–99)
GLUCOSE SERPL-MCNC: 202 MG/DL (ref 74–99)
HCT VFR BLD AUTO: 24.9 % (ref 34–48)
HGB BLD-MCNC: 8.3 G/DL (ref 11.5–15.5)
IMM GRANULOCYTES # BLD AUTO: <0.03 K/UL (ref 0–0.58)
IMM GRANULOCYTES NFR BLD: 0 % (ref 0–5)
LYMPHOCYTES NFR BLD: 1.06 K/UL (ref 1.5–4)
LYMPHOCYTES RELATIVE PERCENT: 18 % (ref 20–42)
MAGNESIUM SERPL-MCNC: 1.9 MG/DL (ref 1.6–2.6)
MCH RBC QN AUTO: 31.6 PG (ref 26–35)
MCHC RBC AUTO-ENTMCNC: 33.3 G/DL (ref 32–34.5)
MCV RBC AUTO: 94.7 FL (ref 80–99.9)
MONOCYTES NFR BLD: 0.57 K/UL (ref 0.1–0.95)
MONOCYTES NFR BLD: 10 % (ref 2–12)
NEUTROPHILS NFR BLD: 68 % (ref 43–80)
NEUTS SEG NFR BLD: 4.07 K/UL (ref 1.8–7.3)
PHOSPHATE SERPL-MCNC: 3.2 MG/DL (ref 2.5–4.5)
PLATELET # BLD AUTO: 195 K/UL (ref 130–450)
PMV BLD AUTO: 9.8 FL (ref 7–12)
POTASSIUM SERPL-SCNC: 4.7 MMOL/L (ref 3.5–5)
PROT SERPL-MCNC: 6.8 G/DL (ref 6.4–8.3)
RBC # BLD AUTO: 2.63 M/UL (ref 3.5–5.5)
SODIUM SERPL-SCNC: 132 MMOL/L (ref 132–146)
WBC OTHER # BLD: 6 K/UL (ref 4.5–11.5)

## 2025-01-14 PROCEDURE — 84100 ASSAY OF PHOSPHORUS: CPT

## 2025-01-14 PROCEDURE — 6360000002 HC RX W HCPCS: Performed by: STUDENT IN AN ORGANIZED HEALTH CARE EDUCATION/TRAINING PROGRAM

## 2025-01-14 PROCEDURE — 36415 COLL VENOUS BLD VENIPUNCTURE: CPT

## 2025-01-14 PROCEDURE — 94640 AIRWAY INHALATION TREATMENT: CPT

## 2025-01-14 PROCEDURE — 2140000000 HC CCU INTERMEDIATE R&B

## 2025-01-14 PROCEDURE — 80053 COMPREHEN METABOLIC PANEL: CPT

## 2025-01-14 PROCEDURE — 6370000000 HC RX 637 (ALT 250 FOR IP): Performed by: STUDENT IN AN ORGANIZED HEALTH CARE EDUCATION/TRAINING PROGRAM

## 2025-01-14 PROCEDURE — 2500000003 HC RX 250 WO HCPCS: Performed by: STUDENT IN AN ORGANIZED HEALTH CARE EDUCATION/TRAINING PROGRAM

## 2025-01-14 PROCEDURE — 99231 SBSQ HOSP IP/OBS SF/LOW 25: CPT | Performed by: SURGERY

## 2025-01-14 PROCEDURE — 83735 ASSAY OF MAGNESIUM: CPT

## 2025-01-14 PROCEDURE — 76705 ECHO EXAM OF ABDOMEN: CPT

## 2025-01-14 PROCEDURE — 2700000000 HC OXYGEN THERAPY PER DAY

## 2025-01-14 PROCEDURE — 85025 COMPLETE CBC W/AUTO DIFF WBC: CPT

## 2025-01-14 PROCEDURE — 82962 GLUCOSE BLOOD TEST: CPT

## 2025-01-14 RX ORDER — FLUTICASONE PROPIONATE 50 MCG
2 SPRAY, SUSPENSION (ML) NASAL DAILY
Qty: 16 G | Refills: 3 | Status: SHIPPED | OUTPATIENT
Start: 2025-01-15

## 2025-01-14 RX ORDER — POLYETHYLENE GLYCOL 3350 17 G/17G
17 POWDER, FOR SOLUTION ORAL DAILY
Status: DISCONTINUED | OUTPATIENT
Start: 2025-01-14 | End: 2025-01-15 | Stop reason: HOSPADM

## 2025-01-14 RX ORDER — SUCRALFATE 1 G/1
1 TABLET ORAL
Qty: 120 TABLET | Refills: 3 | Status: SHIPPED | OUTPATIENT
Start: 2025-01-14

## 2025-01-14 RX ORDER — PANTOPRAZOLE SODIUM 40 MG/1
40 TABLET, DELAYED RELEASE ORAL
Qty: 30 TABLET | Refills: 3 | Status: SHIPPED | OUTPATIENT
Start: 2025-01-14

## 2025-01-14 RX ORDER — CALCIUM ACETATE 667 MG/1
3 CAPSULE ORAL
Qty: 270 CAPSULE | Refills: 0 | Status: SHIPPED | OUTPATIENT
Start: 2025-01-14 | End: 2025-02-13

## 2025-01-14 RX ADMIN — HEPARIN SODIUM 5000 UNITS: 5000 INJECTION INTRAVENOUS; SUBCUTANEOUS at 17:49

## 2025-01-14 RX ADMIN — CALCIUM ACETATE 2001 MG: 667 CAPSULE ORAL at 17:49

## 2025-01-14 RX ADMIN — INSULIN LISPRO 2 UNITS: 100 INJECTION, SOLUTION INTRAVENOUS; SUBCUTANEOUS at 13:51

## 2025-01-14 RX ADMIN — SODIUM CHLORIDE, PRESERVATIVE FREE 20 ML: 5 INJECTION INTRAVENOUS at 20:46

## 2025-01-14 RX ADMIN — CALCIUM ACETATE 2001 MG: 667 CAPSULE ORAL at 13:50

## 2025-01-14 RX ADMIN — INSULIN GLARGINE 6 UNITS: 100 INJECTION, SOLUTION SUBCUTANEOUS at 21:49

## 2025-01-14 RX ADMIN — INSULIN LISPRO 4 UNITS: 100 INJECTION, SOLUTION INTRAVENOUS; SUBCUTANEOUS at 21:49

## 2025-01-14 RX ADMIN — ALBUTEROL SULFATE 2.5 MG: 2.5 SOLUTION RESPIRATORY (INHALATION) at 17:03

## 2025-01-14 RX ADMIN — Medication 1 MG: at 08:24

## 2025-01-14 RX ADMIN — HEPARIN SODIUM 5000 UNITS: 5000 INJECTION INTRAVENOUS; SUBCUTANEOUS at 08:24

## 2025-01-14 RX ADMIN — CLOPIDOGREL BISULFATE 75 MG: 75 TABLET ORAL at 08:24

## 2025-01-14 RX ADMIN — PANTOPRAZOLE SODIUM 40 MG: 40 INJECTION, POWDER, FOR SOLUTION INTRAVENOUS at 20:45

## 2025-01-14 RX ADMIN — FLUTICASONE PROPIONATE 2 SPRAY: 50 SPRAY, METERED NASAL at 08:25

## 2025-01-14 RX ADMIN — CARVEDILOL 25 MG: 25 TABLET, FILM COATED ORAL at 08:24

## 2025-01-14 RX ADMIN — INSULIN LISPRO 6 UNITS: 100 INJECTION, SOLUTION INTRAVENOUS; SUBCUTANEOUS at 17:49

## 2025-01-14 RX ADMIN — ALBUTEROL SULFATE 2.5 MG: 2.5 SOLUTION RESPIRATORY (INHALATION) at 10:38

## 2025-01-14 RX ADMIN — SENNOSIDES AND DOCUSATE SODIUM 1 TABLET: 50; 8.6 TABLET ORAL at 20:46

## 2025-01-14 RX ADMIN — SUCRALFATE 1 G: 1 TABLET ORAL at 20:45

## 2025-01-14 RX ADMIN — HEPARIN SODIUM 5000 UNITS: 5000 INJECTION INTRAVENOUS; SUBCUTANEOUS at 01:50

## 2025-01-14 RX ADMIN — SUCRALFATE 1 G: 1 TABLET ORAL at 13:51

## 2025-01-14 RX ADMIN — SODIUM CHLORIDE, PRESERVATIVE FREE 10 ML: 5 INJECTION INTRAVENOUS at 08:25

## 2025-01-14 RX ADMIN — INSULIN LISPRO 2 UNITS: 100 INJECTION, SOLUTION INTRAVENOUS; SUBCUTANEOUS at 08:37

## 2025-01-14 RX ADMIN — SODIUM CHLORIDE, PRESERVATIVE FREE 10 ML: 5 INJECTION INTRAVENOUS at 20:47

## 2025-01-14 RX ADMIN — PANTOPRAZOLE SODIUM 40 MG: 40 INJECTION, POWDER, FOR SOLUTION INTRAVENOUS at 08:24

## 2025-01-14 RX ADMIN — SUCRALFATE 1 G: 1 TABLET ORAL at 17:49

## 2025-01-14 RX ADMIN — POLYETHYLENE GLYCOL 3350 17 G: 17 POWDER, FOR SOLUTION ORAL at 13:51

## 2025-01-14 RX ADMIN — CALCIUM ACETATE 2001 MG: 667 CAPSULE ORAL at 08:23

## 2025-01-14 NOTE — CARE COORDINATION
Social Work/ Case Management Transition of Care Planning (Jada Alexa, -[phone]):     Per report and chart review Pt is on 4L NC, which is her baseline. Pt on IV Protonix 2x daily. Pt pending US of Abdomen, scheduled for today at 12:15. General surgery signed off. PT/OT pending. Pt declined HHC, and stated she will discharge home with no needs. Pt stated she drove herself here and her truck is in the ED parking lot, she did ask if someone could help her get to the ED entrance at discharge. SW/CM to follow.  Jada Liu, MARCELO  1/14/2025

## 2025-01-14 NOTE — PLAN OF CARE
Problem: Chronic Conditions and Co-morbidities  Goal: Patient's chronic conditions and co-morbidity symptoms are monitored and maintained or improved  Outcome: Progressing  Flowsheets (Taken 1/14/2025 0734 by Kimberly Hobson, RN)  Care Plan - Patient's Chronic Conditions and Co-Morbidity Symptoms are Monitored and Maintained or Improved:   Monitor and assess patient's chronic conditions and comorbid symptoms for stability, deterioration, or improvement   Collaborate with multidisciplinary team to address chronic and comorbid conditions and prevent exacerbation or deterioration     Problem: Discharge Planning  Goal: Discharge to home or other facility with appropriate resources  Outcome: Progressing  Flowsheets (Taken 1/14/2025 0734 by Kimberly Hobson, RN)  Discharge to home or other facility with appropriate resources: Identify barriers to discharge with patient and caregiver     Problem: Safety - Adult  Goal: Free from fall injury  Outcome: Progressing     Problem: ABCDS Injury Assessment  Goal: Absence of physical injury  Outcome: Progressing  Flowsheets (Taken 1/14/2025 0734 by Kimberly Hobson, RN)  Absence of Physical Injury: Implement safety measures based on patient assessment     Problem: Pain  Goal: Verbalizes/displays adequate comfort level or baseline comfort level  Outcome: Progressing

## 2025-01-14 NOTE — CARE COORDINATION
reached out to patients PCP office Dr Rogers Rodríguez at [phone] to schedule follow up D/C appointment.  spoke to office staff and scheduled an appointment on 1/21 at 3:00 PM in office.     Please bring your discharge paperwork, new medications, ID, insurance card and co-pay if you have one.    Information added to D/C summary.

## 2025-01-14 NOTE — PLAN OF CARE
Problem: Chronic Conditions and Co-morbidities  Goal: Patient's chronic conditions and co-morbidity symptoms are monitored and maintained or improved  1/13/2025 2332 by Sumanth Silva RN  Outcome: Progressing  1/13/2025 1553 by Lonnie Adams RN  Outcome: Progressing     Problem: Discharge Planning  Goal: Discharge to home or other facility with appropriate resources  1/13/2025 2332 by Sumanth Silva RN  Outcome: Progressing  1/13/2025 1553 by Lonnie Adams RN  Outcome: Progressing     Problem: Safety - Adult  Goal: Free from fall injury  1/13/2025 2332 by Sumanth Silva RN  Outcome: Progressing  1/13/2025 1553 by Lonnie Adams RN  Outcome: Progressing  Flowsheets (Taken 1/13/2025 1126)  Free From Fall Injury: Instruct family/caregiver on patient safety     Problem: ABCDS Injury Assessment  Goal: Absence of physical injury  Outcome: Progressing  Flowsheets (Taken 1/13/2025 1126 by Lonnie Adams RN)  Absence of Physical Injury: Implement safety measures based on patient assessment     Problem: Pain  Goal: Verbalizes/displays adequate comfort level or baseline comfort level  Outcome: Progressing  Flowsheets (Taken 1/13/2025 1930)  Verbalizes/displays adequate comfort level or baseline comfort level:   Encourage patient to monitor pain and request assistance   Assess pain using appropriate pain scale   Administer analgesics based on type and severity of pain and evaluate response

## 2025-01-15 VITALS
TEMPERATURE: 98.1 F | DIASTOLIC BLOOD PRESSURE: 70 MMHG | HEART RATE: 114 BPM | SYSTOLIC BLOOD PRESSURE: 132 MMHG | WEIGHT: 162.26 LBS | HEIGHT: 59 IN | OXYGEN SATURATION: 100 % | RESPIRATION RATE: 20 BRPM | BODY MASS INDEX: 32.71 KG/M2

## 2025-01-15 LAB
ALBUMIN SERPL-MCNC: 3.3 G/DL (ref 3.5–5.2)
ALP SERPL-CCNC: 90 U/L (ref 35–104)
ALT SERPL-CCNC: 10 U/L (ref 0–32)
ANION GAP SERPL CALCULATED.3IONS-SCNC: 13 MMOL/L (ref 7–16)
AST SERPL-CCNC: 17 U/L (ref 0–31)
BASOPHILS # BLD: 0.02 K/UL (ref 0–0.2)
BASOPHILS NFR BLD: 0 % (ref 0–2)
BILIRUB SERPL-MCNC: 0.3 MG/DL (ref 0–1.2)
BUN SERPL-MCNC: 39 MG/DL (ref 6–20)
CALCIUM SERPL-MCNC: 9 MG/DL (ref 8.6–10.2)
CHLORIDE SERPL-SCNC: 94 MMOL/L (ref 98–107)
CO2 SERPL-SCNC: 28 MMOL/L (ref 22–29)
CREAT SERPL-MCNC: 7.4 MG/DL (ref 0.5–1)
EOSINOPHIL # BLD: 0.27 K/UL (ref 0.05–0.5)
EOSINOPHILS RELATIVE PERCENT: 5 % (ref 0–6)
ERYTHROCYTE [DISTWIDTH] IN BLOOD BY AUTOMATED COUNT: 16.4 % (ref 11.5–15)
GFR, ESTIMATED: 7 ML/MIN/1.73M2
GLUCOSE BLD-MCNC: 194 MG/DL (ref 74–99)
GLUCOSE BLD-MCNC: 225 MG/DL (ref 74–99)
GLUCOSE SERPL-MCNC: 170 MG/DL (ref 74–99)
HCT VFR BLD AUTO: 23.6 % (ref 34–48)
HGB BLD-MCNC: 7.8 G/DL (ref 11.5–15.5)
IMM GRANULOCYTES # BLD AUTO: <0.03 K/UL (ref 0–0.58)
IMM GRANULOCYTES NFR BLD: 0 % (ref 0–5)
LYMPHOCYTES NFR BLD: 1.2 K/UL (ref 1.5–4)
LYMPHOCYTES RELATIVE PERCENT: 21 % (ref 20–42)
MAGNESIUM SERPL-MCNC: 2.1 MG/DL (ref 1.6–2.6)
MCH RBC QN AUTO: 31.8 PG (ref 26–35)
MCHC RBC AUTO-ENTMCNC: 33.1 G/DL (ref 32–34.5)
MCV RBC AUTO: 96.3 FL (ref 80–99.9)
MONOCYTES NFR BLD: 0.53 K/UL (ref 0.1–0.95)
MONOCYTES NFR BLD: 9 % (ref 2–12)
NEUTROPHILS NFR BLD: 64 % (ref 43–80)
NEUTS SEG NFR BLD: 3.61 K/UL (ref 1.8–7.3)
PHOSPHATE SERPL-MCNC: 3.3 MG/DL (ref 2.5–4.5)
PLATELET # BLD AUTO: 210 K/UL (ref 130–450)
PMV BLD AUTO: 10 FL (ref 7–12)
POTASSIUM SERPL-SCNC: 4.9 MMOL/L (ref 3.5–5)
PROT SERPL-MCNC: 6.5 G/DL (ref 6.4–8.3)
RBC # BLD AUTO: 2.45 M/UL (ref 3.5–5.5)
SODIUM SERPL-SCNC: 135 MMOL/L (ref 132–146)
WBC OTHER # BLD: 5.6 K/UL (ref 4.5–11.5)

## 2025-01-15 PROCEDURE — 2500000003 HC RX 250 WO HCPCS: Performed by: STUDENT IN AN ORGANIZED HEALTH CARE EDUCATION/TRAINING PROGRAM

## 2025-01-15 PROCEDURE — 85025 COMPLETE CBC W/AUTO DIFF WBC: CPT

## 2025-01-15 PROCEDURE — 97161 PT EVAL LOW COMPLEX 20 MIN: CPT

## 2025-01-15 PROCEDURE — 6370000000 HC RX 637 (ALT 250 FOR IP): Performed by: STUDENT IN AN ORGANIZED HEALTH CARE EDUCATION/TRAINING PROGRAM

## 2025-01-15 PROCEDURE — 97530 THERAPEUTIC ACTIVITIES: CPT

## 2025-01-15 PROCEDURE — 90935 HEMODIALYSIS ONE EVALUATION: CPT

## 2025-01-15 PROCEDURE — 36415 COLL VENOUS BLD VENIPUNCTURE: CPT

## 2025-01-15 PROCEDURE — 84100 ASSAY OF PHOSPHORUS: CPT

## 2025-01-15 PROCEDURE — 97165 OT EVAL LOW COMPLEX 30 MIN: CPT

## 2025-01-15 PROCEDURE — 6360000002 HC RX W HCPCS: Performed by: STUDENT IN AN ORGANIZED HEALTH CARE EDUCATION/TRAINING PROGRAM

## 2025-01-15 PROCEDURE — 80053 COMPREHEN METABOLIC PANEL: CPT

## 2025-01-15 PROCEDURE — 82962 GLUCOSE BLOOD TEST: CPT

## 2025-01-15 PROCEDURE — 2700000000 HC OXYGEN THERAPY PER DAY

## 2025-01-15 PROCEDURE — 99239 HOSP IP/OBS DSCHRG MGMT >30: CPT | Performed by: STUDENT IN AN ORGANIZED HEALTH CARE EDUCATION/TRAINING PROGRAM

## 2025-01-15 PROCEDURE — 83735 ASSAY OF MAGNESIUM: CPT

## 2025-01-15 RX ADMIN — PANTOPRAZOLE SODIUM 40 MG: 40 INJECTION, POWDER, FOR SOLUTION INTRAVENOUS at 13:11

## 2025-01-15 RX ADMIN — INSULIN LISPRO 2 UNITS: 100 INJECTION, SOLUTION INTRAVENOUS; SUBCUTANEOUS at 13:11

## 2025-01-15 RX ADMIN — SUCRALFATE 1 G: 1 TABLET ORAL at 13:11

## 2025-01-15 RX ADMIN — SODIUM CHLORIDE, PRESERVATIVE FREE 10 ML: 5 INJECTION INTRAVENOUS at 13:10

## 2025-01-15 RX ADMIN — CLOPIDOGREL BISULFATE 75 MG: 75 TABLET ORAL at 13:11

## 2025-01-15 RX ADMIN — HEPARIN SODIUM 5000 UNITS: 5000 INJECTION INTRAVENOUS; SUBCUTANEOUS at 00:42

## 2025-01-15 RX ADMIN — CALCIUM ACETATE 2001 MG: 667 CAPSULE ORAL at 13:11

## 2025-01-15 RX ADMIN — INSULIN LISPRO 2 UNITS: 100 INJECTION, SOLUTION INTRAVENOUS; SUBCUTANEOUS at 06:19

## 2025-01-15 RX ADMIN — SUCRALFATE 1 G: 1 TABLET ORAL at 06:19

## 2025-01-15 RX ADMIN — SENNOSIDES AND DOCUSATE SODIUM 1 TABLET: 50; 8.6 TABLET ORAL at 13:12

## 2025-01-15 RX ADMIN — FLUTICASONE PROPIONATE 2 SPRAY: 50 SPRAY, METERED NASAL at 13:11

## 2025-01-15 RX ADMIN — CARVEDILOL 25 MG: 25 TABLET, FILM COATED ORAL at 13:11

## 2025-01-15 RX ADMIN — Medication 1 MG: at 13:11

## 2025-01-15 NOTE — CARE COORDINATION
Discharge order noted. Patient down for hemodialysis. Call made to David at [facility] and requested portable oxygen tank be delivered; he will drop tank off in the next hour or so. Patient to return home, no needs reported, declined home care services and has transport home; truck parked in ED parking lot. [facility] informed of patient's discharge.    3:15P  Wheeled walker recommended by therapy. Spoke with patient at bedside, choiced for DME; she has no preference on DME company and requested the walker be delivered to her home. Discussed home health care services; patient had question regarding a family member becoming her home health aide, not interested in c. Referral made to [facility]; walker to be delivered to patient's home.     The Plan for Transition of Care is related to the following treatment goals: discharge planning    The Patient and/or patient representative Michellecorina Nettles was provided with a choice of provider and agrees with the discharge plan. [x] Yes [] No    Freedom of choice list was provided with basic dialogue that supports the patient's individualized plan of care/goals, treatment preferences and shares the quality data associated with the providers. [x] Yes [] No     Electronically signed by NEEMA Zhou on 1/15/2025 at 9:03 AM

## 2025-01-15 NOTE — PLAN OF CARE
Problem: Chronic Conditions and Co-morbidities  Goal: Patient's chronic conditions and co-morbidity symptoms are monitored and maintained or improved  1/15/2025 1357 by Zbigniew Payne RN  Outcome: Progressing  1/15/2025 0542 by Giovana Phillips RN  Outcome: Progressing  1/15/2025 0035 by Giovana Phillips RN  Outcome: Progressing     Problem: Discharge Planning  Goal: Discharge to home or other facility with appropriate resources  1/15/2025 1357 by Zbigniew Payne RN  Outcome: Progressing  1/15/2025 0542 by Giovana Phillips RN  Outcome: Progressing  1/15/2025 0035 by Giovana Phillips RN  Outcome: Progressing     Problem: Safety - Adult  Goal: Free from fall injury  1/15/2025 1357 by Zbigniew Payne RN  Outcome: Progressing  1/15/2025 0542 by Giovana Phillips RN  Outcome: Progressing  1/15/2025 0035 by Giovana Phillips RN  Outcome: Progressing     Problem: ABCDS Injury Assessment  Goal: Absence of physical injury  1/15/2025 1357 by Zbigniew Payne RN  Outcome: Progressing  1/15/2025 0542 by Giovana Phillips RN  Outcome: Progressing  1/15/2025 0035 by Giovana Phillips RN  Outcome: Progressing     Problem: Pain  Goal: Verbalizes/displays adequate comfort level or baseline comfort level  1/15/2025 1357 by Zbigniew Payne RN  Outcome: Progressing  1/15/2025 0542 by Giovana Pihllips RN  Outcome: Progressing  1/15/2025 0035 by Giovana Phillips RN  Outcome: Progressing

## 2025-01-15 NOTE — FLOWSHEET NOTE
01/15/25 1206   Vital Signs   /71   Pulse (!) 104   Respirations 18   Weight - Scale 73.6 kg (162 lb 4.1 oz)   Percent Weight Change -3.92   Pain Assessment   Pain Assessment None - Denies Pain   Post-Hemodialysis Assessment   Post-Treatment Procedures Blood returned;Access bleeding time < 10 minutes   Machine Disinfection Process Acid/Vinegar Clean;Heat Disinfect;Exterior Machine Disinfection   Rinseback Volume (ml) 300 ml   Blood Volume Processed (Liters) 89.2 L   Dialyzer Clearance Lightly streaked   Duration of Treatment (minutes) 240 minutes   Hemodialysis Intake (ml) 300 ml   Hemodialysis Output (ml) 3300 ml   NET Removed (ml) 3000   Tolerated Treatment Good

## 2025-01-15 NOTE — PROGRESS NOTES
Synopsis: Patient admitted on 1/10/2025     Ms. Michelle Nettles, a 39 y.o. year old female  who  has a past medical history of JOLLY (acute kidney injury) ([facility]), AVF (arteriovenous fistula) ([facility]), Chronic kidney disease, Dialysis AV fistula malfunction, initial encounter ([facility]), DM type 1 (diabetes mellitus, type 1) ([facility]), Encounter regarding vascular access for dialysis for ESRD ([facility]), ESRD (end stage renal disease) ([facility]), Hemodialysis patient ([facility]), Hypertension, and Tobacco abuse.  Patient presented to emergency department secondary to low hemoglobin.  Was allegedly at [facility] yesterday for evaluation regarding fistula had next scheduled session of dialysis on Friday was told that her hemoglobin was low and she should present to ED.  Patient also has history of chronic respiratory failure on oxygen, ran out of oxygen and her car in ED parking lot and had difficult time getting in.  At time of examination patient denies shortness of breath or chest pain does admit to black tarry stools.  Decision was made to admit patient to medicine for acute on chronic anemia with consult placed for nephrology for dialysis needs.           Subjective    Patient seen at bedside.   Seen post EGD.  She states she is hungry.  Otherwise she denies any other acute complaints.  Exam:  BP (!) 174/74   Pulse (!) 101   Temp 98 °F (36.7 °C) (Oral)   Resp 19   Ht 1.499 m (4' 11\")   Wt 75.8 kg (167 lb 1.4 oz)   SpO2 100%   BMI 33.75 kg/m²   General appearance: No apparent distress, appears stated age and cooperative.  HEENT: Pupils equal, round, and reactive to light. Conjunctivae/corneas clear.  Neck: Trachea midline.  Respiratory: Good air movement and  Cardiovascular: Regular rate and rhythm with normal S1/S2 without murmurs, rubs or gallops.  Abdomen: Epigastric tenderness.  musculoskeletal: No clubbing, cyanosis 1+ edema bilaterally.   Skin:  No rashes    Neurologic: awake, alert and following commands 
       Synopsis: Patient admitted on 1/10/2025     Ms. Michelle Nettles, a 39 y.o. year old female  who  has a past medical history of JOLLY (acute kidney injury) ([facility]), AVF (arteriovenous fistula) ([facility]), Chronic kidney disease, Dialysis AV fistula malfunction, initial encounter ([facility]), DM type 1 (diabetes mellitus, type 1) ([facility]), Encounter regarding vascular access for dialysis for ESRD ([facility]), ESRD (end stage renal disease) ([facility]), Hemodialysis patient ([facility]), Hypertension, and Tobacco abuse.  Patient presented to emergency department secondary to low hemoglobin.  Was allegedly at [facility] yesterday for evaluation regarding fistula had next scheduled session of dialysis on Friday was told that her hemoglobin was low and she should present to ED.  Patient also has history of chronic respiratory failure on oxygen, ran out of oxygen and her car in ED parking lot and had difficult time getting in.  At time of examination patient denies shortness of breath or chest pain does admit to black tarry stools.  Decision was made to admit patient to medicine for acute on chronic anemia with consult placed for nephrology for dialysis needs.           Subjective    Patient seen at bedside.   She is complaining epigastric pain, some nausea.  States she is not had a bowel movement today, had to struggle for a small bowel movement yesterday.  She usually takes a stool softener at home.  Exam:  BP (!) 144/68   Pulse (!) 102   Temp 97.5 °F (36.4 °C) (Temporal)   Resp 16   Ht 1.499 m (4' 11\")   Wt 73.1 kg (161 lb 3.2 oz)   SpO2 98%   BMI 32.56 kg/m²   General appearance: No apparent distress, appears stated age and cooperative.  HEENT: Pupils equal, round, and reactive to light. Conjunctivae/corneas clear.  Neck: Trachea midline.  Respiratory: Good air movement and  Cardiovascular: Regular rate and rhythm with normal S1/S2 without murmurs, rubs or gallops.  Abdomen: Epigastric tenderness.  musculoskeletal: No clubbing, 
4 Eyes Skin Assessment     NAME:  Michelle Nettles  YOB: 1985  MEDICAL RECORD NUMBER:  [id number]    The patient is being assessed for  Admission    I agree that at least one RN has performed a thorough Head to Toe Skin Assessment on the patient. ALL assessment sites listed below have been assessed.      Areas assessed by both nurses:    Head, Face, Ears, Shoulders, Back, Chest, Arms, Elbows, Hands, Sacrum. Buttock, Coccyx, Ischium, Legs. Feet and Heels, and Under Medical Devices         Does the Patient have a Wound? No noted wound(s)       Edgar Prevention initiated by RN: Yes  Wound Care Orders initiated by RN: No    Pressure Injury (Stage 3,4, Unstageable, DTI, NWPT, and Complex wounds) if present, place Wound referral order by RN under : No    New Ostomies, if present place, Ostomy referral order under : No     Nurse 1 eSignature: Electronically signed by Sumanth Silva RN on 1/11/25 at 2:10 AM EST    **SHARE this note so that the co-signing nurse can place an eSignature**    Nurse 2 eSignature: Electronically signed by Zahraa Levine RN on 1/11/25 at 6:15 AM EST  
CLINICAL PHARMACY NOTE: MEDS TO BEDS    Total # of Prescriptions Filled: 4   The following medications were delivered to the patient:  Sucralfate 1 gm  Pantoprazole 40 mg  Calcium acetate 667 mg  Fluticasone nasal #16    Additional Documentation:   Delivered to Mabel RODRIGUEZ  
Called patients family to try to get her a O2 tank.  Waiting on call back to see if anyone can bring up a tank.  
Dr. Dorian Beltrán ENT notified of consult via perfect serve   
Dr. Draper notified of Cr. Of 10.9 as critical but however, trending down since yesterday.   
GENERAL SURGERY  DAILY PROGRESS NOTE  1/11/2025    CHIEF COMPLAINT:  Chief Complaint   Patient presents with    Shortness of Breath     Patient c/o SOB and ear pain , states she is having a hard time hearing out of right ear. Also states her HGB is low       SUBJECTIVE:  Feeling well this morning, underwent HD. Dark BM no dajuan blood, no hematemesis.     OBJECTIVE:  /69   Pulse (!) 103   Temp 97.7 °F (36.5 °C) (Temporal)   Resp 15   Ht 1.499 m (4' 11\")   Wt 74.4 kg (164 lb 0.4 oz)   SpO2 100%   BMI 33.13 kg/m²     GENERAL:  NAD. A&Ox3.  HEENT: normocephalic  LUNGS:  on room air. No acute respiratory distress  CARDIOVASCULAR: hemodynamically stable  SKIN: warm and dry   ABDOMEN:  Soft,mildly distended, nontender. No guarding, rigidity, rebound.  EXT: ROM intact all 4 extremities, no obvious deformities    ASSESSMENT/PLAN:  39 y.o. female with dark stools, acute on chronic anemia hx of CKD on HD     Plan  -monitor for signs of gib   -ppi and carafate  -monitor hgb  -transfuse for hgb <7 , or < 8 and symptomatic   -pain and nausea control    Sheldon Sharp MD  Surgery Resident PGY-4  1/11/2025  7:28 AM                                                                                              Attending Attestation     I have seen and examined this patient.  I have personally reviewed and interpreted all relevant labs and imaging.  I agree with the resident documentation.    Pt has a Hx of gastritis and states this pain is in the same location as her previous episodes, however this pain is worse.  She endorses melena.  I have discussed the risks, benefits, and alternatives to esophagogastroduodenoscopy with possible biopsy with deep sedation with the patient. I have detailed the risks of deep sedation (hypotension, hypoxia) as well as complications of bleeding and perforation.  The patient understands the above and agrees to proceed.  Will plan for EGD 1/13 with my partner Dr. Gonzalez.      BP (!) 141/72   
GENERAL SURGERY  DAILY PROGRESS NOTE  1/12/2025    CHIEF COMPLAINT:  Chief Complaint   Patient presents with    Shortness of Breath     Patient c/o SOB and ear pain , states she is having a hard time hearing out of right ear. Also states her HGB is low       SUBJECTIVE:  Feels the same this morning. Able to eat.    OBJECTIVE:  BP (!) 132/58   Pulse (!) 102   Temp 98.1 °F (36.7 °C) (Temporal)   Resp 20   Ht 1.499 m (4' 11\")   Wt 73.1 kg (161 lb 3.2 oz)   SpO2 97%   BMI 32.56 kg/m²     GENERAL:  NAD. A&Ox3.  HEENT: normocephalic  LUNGS:  on room air. No acute respiratory distress  CARDIOVASCULAR: hemodynamically stable  SKIN: warm and dry   ABDOMEN:  Soft, non-distended, nontender. No guarding, rigidity, rebound.  EXT: ROM intact all 4 extremities, no obvious deformities    ASSESSMENT/PLAN:  39 y.o. female with dark stools, acute on chronic anemia hx of CKD on HD     Plan  -monitor for signs of gib   -ppi and carafate  -monitor hgb  -transfuse for hgb <7 , or < 8 and symptomatic   -pain and nausea control  -EGD tomorrow, 1/13  -NPO at midnight.    America Ceron MD  Surgery Resident PGY-4  1/12/2025  6:37 AM     Attending Attestation      I have seen and examined this patient.  I have personally reviewed and interpreted all relevant labs and imaging.  I agree with the resident documentation.     No acute issues overnight.   Will plan for EGD 1/13 with my partner Dr. Gonzalez.  Recommend trending Hb q12hrs.  Transfuse for Hb < 7      BP (!) 132/58   Pulse (!) 102   Temp 98.1 °F (36.7 °C) (Temporal)   Resp 20   Ht 1.499 m (4' 11\")   Wt 73.1 kg (161 lb 3.2 oz)   SpO2 97%   BMI 32.56 kg/m²     CBC:   Lab Results   Component Value Date/Time    WBC 6.7 01/12/2025 05:08 AM    RBC 2.19 01/12/2025 05:08 AM    RBC 2.92 02/22/2022 08:11 PM    HGB 7.1 01/12/2025 05:08 AM    HCT 21.2 01/12/2025 05:08 AM    MCV 96.8 01/12/2025 05:08 AM    MCH 32.4 01/12/2025 05:08 AM    MCHC 33.5 01/12/2025 05:08 AM    RDW 16.5 
No acute issues overnight. Received 1u PRBC yesterday with appropriate response in post-transfusion Hgb. Keep pt NPO, sips with meds. Plan for EGD today 1/13    Electronically signed by Alvin Whitney MD on 1/13/2025 at 5:34 AM  
Notified Dr. Salcido of the following:    Patients blood glucose read out of range and high twice on the acu check. Stat blood glucose sent to lab.     Patient is c/o being hot with extreme fatigue, dry mouth, nausea and vomiting.     Her blood pressure was 147/56. Heart rate was 101. Temp was 99.4. SpO2 was 100% on 5L NC which is patients baseline.     Repeat HBG ordered stat due to hbg of 7.1 this morning.   
Notified Dr. Salcido that patients blood sugar was 499 on blood work. Patient was ordered an additional 8 units on top of her sliding scale for a total of 12 units.    Also notified her that patients hemoglobin came back 6.8  
Notified primary that patient has subcutaneous heparin ordered for dvt prophylaxis but is supposed to have an EGD done for possible gi bleed and hemoglobin is 7.1 today.   
Occupational Therapy  OT SESSION ATTEMPT     Date:2025  Patient Name: Michelle Nettles  MRN: [id number]  : 1985  Room: ENDO POOL ROOM/NONE     Attempted OT session this date:    [] unavailable due to other medical staff currently with pt   [] on hold per nursing staff   [] on hold per nursing staff secondary to lab / radiology results    [] declined Occupational Therapy  this date due to ___.  Benefits of participation in therapy reviewed with pt.    [x] off unit   [] Other:     Will reattempt OT evaluation at a later time.    Tej Ngo OTR/L #[id number]    
Patient's , Trent Levin NP notified via PS   
Physical Therapy    PT evaluation orders received and chart review completed. Pt off unit at time of attempt.     Will follow and re-attempt as appropriate. Thank you.    Sadia Dawson PT, DPT  [id number]      
Physical Therapy  Initial Assessment     Name: Michelle Nettles  : 1985  MRN: [id number]      Date of Service: 1/15/2025    Evaluating PT: Sheldon Flaherty, PT, DPT [id number]      Room #:  6415/6415-B  Diagnosis:  Shortness of breath [R06.02]  End stage renal disease (HCC) [N18.6]  Anemia [D64.9]  Anemia, unspecified type [D64.9]  PMHx/PSHx:   has a past medical history of JOLLY (acute kidney injury) (HCC), AVF (arteriovenous fistula) (HCC), Chronic kidney disease, Dialysis AV fistula malfunction, initial encounter (HCC), DM type 1 (diabetes mellitus, type 1) (HCC), Encounter regarding vascular access for dialysis for ESRD (HCC), ESRD (end stage renal disease) (HCC), Hemodialysis patient (HCC), Hypertension, and Tobacco abuse.  Procedure/Surgery:  EGD with biopsy   Precautions:  Fall risk, HD, O2, Continuous pulse ox  Equipment Needs:  WW    SUBJECTIVE:    Pt lives with 10 y/o daughter in a 1 story house with level entry. Pt ambulated without AD prior to admission. Pt used WC PRN. Pt is on 5 L O2/min at baseline.    OBJECTIVE:   Initial Evaluation  Date: 1/15/25 Treatment Date: Short Term/ Long Term   Goals   AM-PAC 6 Clicks      Was pt agreeable to Eval/treatment? Yes     Does pt have pain? No complaints of pain     Bed Mobility  Rolling: NT  Supine to sit: NT  Sit to supine: NT  Scooting: NT  Rolling: Independent   Supine to sit: Independent   Sit to supine: Independent   Scooting: Independent    Transfers Sit to stand: SBA  Stand to sit: SBA  Stand pivot: Dariela without AD, SBA with WW  Sit to stand: Independent   Stand to sit: Independent   Stand pivot: Mod Independent with WW   Ambulation   25 feet without AD with Dariela  25 feet with WW with SBA  >200 feet with WW Mod Independent    Stair negotiation: ascended and descended NT  NA   ROM BUE: Refer to OT note  BLE: WFL     Strength BUE: Refer to OT note  BLE: NT     Balance Sitting EOB: Independent   Dynamic Standing: Dariela without AD, SBA with WW 
Primary notified that patient's blood sugar was 351. 2 additional units were ordered.  
Pt unable to come to ultrasound d/t pt having dialysis. Will resend for patient when schedule permits and she is able  
[facility]  Nephrology Progress Note    Patient's Name: Michelle Nettles     History OF Present Illness From 1/10 Consult Note:  \"Michelle Nettles is a 39 y.o. female with a past medical history of ESRD, diabetes mellitus, and hypertension.  She presented to the ED on 1/9 reportedly for concerns of low hemoglobin and shortness of breath.  Vital signs on 1/9 includes temperature 97.4, respirations 18, pulse 108, /57, and she was 100% SpO2.  Lab data 1/9 includes BUN 78, creatinine 14.1, anion gap 22, glucose 146, and hemoglobin 6.1. She had a chest x-ray on 1/9 which showed no signs of an acute cardiopulmonary process.  Nephrology has been consulted to see the patient for end-stage renal disease on HD.  Patient is known to our service and dialyzes as an outpatient at [facility] via left arm AV fistula.  At present, patient was seen and examined.  She came in due to concerns of low hemoglobin.  She notes that she was at the access center on Wednesday.  She reports vomiting x 1.  She also reports shortness of breath.\"     Subjective:      1/11/2025: Patient seen and examined on HD. She reports feeling okay today. She reports treatments have been going okay other than her dialysis access arm being swollen. She denies any other complaints.     Problem List:    Patient Active Problem List   Diagnosis    Poorly controlled type 1 diabetes mellitus (HCC)    Primary hypertension    Facial cellulitis    Acute hyperkalemia    Nasal polyp    Periorbital cellulitis of right eye    Periorbital cellulitis    Vitamin B deficiency, unspecified    Nasal congestion    Hidradenitis suppurativa    Symptomatic anemia    Normocytic anemia due to blood loss    Acute decompensated heart failure (HCC)    Gall stone    Dietary noncompliance    Metabolic encephalopathy    Hypoxia    Hypotension    History of venous thromboembolism    Chronic systolic (congestive) heart failure    MRSA colonization    ESRD on 
[facility]  Nephrology Progress Note    Patient's Name: Michelle Nettles     History OF Present Illness From 1/10 Consult Note:  \"Michelle Nettles is a 39 y.o. female with a past medical history of ESRD, diabetes mellitus, and hypertension.  She presented to the ED on 1/9 reportedly for concerns of low hemoglobin and shortness of breath.  Vital signs on 1/9 includes temperature 97.4, respirations 18, pulse 108, /57, and she was 100% SpO2.  Lab data 1/9 includes BUN 78, creatinine 14.1, anion gap 22, glucose 146, and hemoglobin 6.1. She had a chest x-ray on 1/9 which showed no signs of an acute cardiopulmonary process.  Nephrology has been consulted to see the patient for end-stage renal disease on HD.  Patient is known to our service and dialyzes as an outpatient at [facility] via left arm AV fistula.  At present, patient was seen and examined.  She came in due to concerns of low hemoglobin.  She notes that she was at the access center on Wednesday.  She reports vomiting x 1.  She also reports shortness of breath.\"     Subjective:      1/12/2025: Patient seen and examined. She reports feeling okay today. She reports abdominal cramping and a poor appetite d/t the cramping. She reports that she is mainly just eating ice chips. She denies chest pain. She denies shortness of breath.     Problem List:    Patient Active Problem List   Diagnosis    Poorly controlled type 1 diabetes mellitus (HCC)    Primary hypertension    Facial cellulitis    Acute hyperkalemia    Nasal polyp    Periorbital cellulitis of right eye    Periorbital cellulitis    Vitamin B deficiency, unspecified    Nasal congestion    Hidradenitis suppurativa    Symptomatic anemia    Normocytic anemia due to blood loss    Acute decompensated heart failure (HCC)    Gall stone    Dietary noncompliance    Metabolic encephalopathy    Hypoxia    Hypotension    History of venous thromboembolism    Chronic systolic (congestive) 
diet  We will sign off    Balaji Gonzalez MD, FACS  1/14/2025  10:54 AM    NOTE: This report was transcribed using voice recognition software. Every effort was made to ensure accuracy; however, inadvertent computerized transcription errors may be present.         
1400  Time Out: 1430  Total Treatment Time: 10 minutes    Min Units   OT Eval Low 97165  x  1   OT Eval Medium 34532      OT Eval High 09946      OT Re-Eval 37253       Therapeutic Ex 64223       Therapeutic Activities 88092  8  1   ADL/Self Care 90933  2     Orthotic Management 43030       Manual 64083     Neuro Re-Ed 77001       Non-Billable Time          Evaluation Time additionally includes thorough review of current medical information, gathering information on past medical history/social history and prior level of function, interpretation of standardized testing/informal observation of tasks, assessment of data and development of plan of care and goals.          Tej Ngo OTR/L #[id number]    
24 hours) at 1/11/2025 1601  Last data filed at 1/11/2025 0823  Gross per 24 hour   Intake 580 ml   Output 2857 ml   Net -2277 ml       Labs:   Recent Labs     01/09/25  2139 01/10/25  0915 01/11/25  0634   WBC 5.3 7.0 6.9   HGB 6.1* 7.5* 7.4*   HCT 17.7* 21.8* 21.9*   PLT  --  206 191       Recent Labs     01/09/25  2139 01/10/25  0945 01/11/25  0634    136 141   K 4.9 5.2* 4.6   CL 91* 91* 98   CO2 23 23 26   BUN 78* 81* 56*   CREATININE 14.1* 15.3* 10.9*   CALCIUM 8.0* 8.0* 8.2*   PHOS  --   --  6.5*       Recent Labs     01/09/25 2139 01/11/25  0634   ALKPHOS 96 107*   ALT 12 9   AST 32* 11   BILITOT 0.3 0.3       No results for input(s): \"INR\" in the last 72 hours.    No results for input(s): \"CKTOTAL\", \"TROPONINI\" in the last 72 hours.    Chronic labs:  Lab Results   Component Value Date    CHOL 155 10/08/2013    TRIG 68 10/08/2013    HDL 40 01/29/2021    TSH 2.69 08/01/2024    INR 1.0 01/30/2024    LABA1C 6.0 (H) 06/06/2024           Radiology:  Imaging studies reviewed today.    ASSESSMENT:    Principal Problem:    Anemia  Resolved Problems:    * No resolved hospital problems. *  History of hemodialysis  Right ear hearing loss  Abdominal pain  Chronic O2 dependence  Type 2 diabetes requiring insulin     PLAN:      Anemia with significant component noted.  Patient is currently seen by GI and they are planning to scope her with EGD and biopsy.  Nephrology will follow laterally for dialysis needs  ENT for her ear issues on the right side.  Abdominal pain could be constipation?  Her abdominal is quite hard.  Maintaining Lantus coverage plus Humalog and her blood sugar typically between 187 and then 351.  Chronic oxygen needs and she is currently on 5 L.  Her creatinine is pretty high given her small body size and lack of muscle mass.  If she getting effective dialysis?  Check an abdominal x-ray  Order TSH  Currently receiving IV fluids and IV Protonix  DVT prophylaxis    Diet: ADULT DIET; Regular; 5 carb 
questions appropriately    Data:    Recent Labs     01/13/25 0423 01/14/25  0549 01/15/25  0545   WBC 6.5 6.0 5.6   HGB 8.2* 8.3* 7.8*   HCT 24.4* 24.9* 23.6*   MCV 94.2 94.7 96.3    195 210       Recent Labs     01/13/25 0423 01/14/25  0549 01/15/25  0545    132 135   K 4.7 4.7 4.9   CL 94* 93* 94*   CO2 36* 29 28   CREATININE 8.1* 5.1* 7.4*   BUN 36* 27* 39*   LABGLOM 6* 10* 7*   GLUCOSE 95 202* 170*   CALCIUM 9.1 9.0 9.0   PHOS 4.6* 3.2 3.3   MG 2.0 1.9 2.1       Vit D, 25-Hydroxy   Date Value Ref Range Status   12/05/2023 0.25  Corrected       No results found for: \"PTH\"    Recent Labs     01/13/25 0423 01/14/25  0549 01/15/25  0545   ALT 10 9 10   AST 15 17 17   ALKPHOS 91 89 90   BILITOT 0.9 0.3 0.3       No results for input(s): \"LABALBU\" in the last 72 hours.    Ferritin   Date Value Ref Range Status   08/02/2024 504 ng/mL Final     Comment:           FERRITIN Reference Ranges:  Adult Males   20 - 60 years:    30 - 400 ng/mL  Adult females 17 - 60 years:    13 - 150 ng/mL  Adults greater than 60 years:   no established reference range  Pediatrics:  no established reference range       Iron   Date Value Ref Range Status   08/02/2024 60 37 - 145 ug/dL Final     TIBC   Date Value Ref Range Status   08/02/2024 149 (L) 250 - 450 ug/dL Final       Vitamin B-12   Date Value Ref Range Status   03/23/2024 1852 (H) 211 - 946 pg/mL Final       Folate   Date Value Ref Range Status   03/23/2024 12.3 4.8 - 24.2 ng/mL Final       Lab Results   Component Value Date/Time    COLORU Yellow 11/23/2017 10:05 PM    NITRU Negative 11/23/2017 10:05 PM    GLUCOSEU 500 11/23/2017 10:05 PM    GLUCOSEU >=1000 11/21/2010 10:30 PM    KETUA Negative 11/23/2017 10:05 PM    UROBILINOGEN 0.2 11/23/2017 10:05 PM    BILIRUBINUR Negative 11/23/2017 10:05 PM    BILIRUBINUR NEGATIVE 11/21/2010 10:30 PM       Lab Results   Component Value Date/Time    PROTEIN 6.5 01/15/2025 05:45 AM       No components found for: \"URIC\"    No 
BILIRUBINUR NEGATIVE 11/21/2010 10:30 PM       Lab Results   Component Value Date/Time    PROTEIN 6.9 01/13/2025 04:23 AM       No components found for: \"URIC\"    No results found for: \"LIPIDPAN\"    Assessment and Plans:    ESRD on HD  Outpatient [facility] MWF via left arm AV fistula  Monitor labs  Continue HD while inpatient-for HD today    2.  Anemia with CKD/ESRD  Hemoglobin target 10-12  Hemoglobin 6.1--> 8.2-below target  S/p PRBCs  S/p c-scope/EGD 7/18 - internal hemorrhoids, acute gastritis   S/p EGD 1/13-acute gastritis-biopsied  Transfuse for hemoglobin<7-as per primary service  Monitor H&H    3.  Hypertension with CKD/ESRD  BP goal<130/80  BP above goal  Monitor BPs    4.  Secondary hyperparathyroidism of renal origin   and phosphorus 9.4 on 1/6 in the outpatient setting  Phosphorus 4.6 today  On calcium acetate  Low phosphorus diet  Monitor labs    KEMI Lewis - CNP    Patient seen and examined all key components of the physical performed independently , case discussed with NP, all pertinent labs and radiologic tests personally reviewed agree with above.      Elias Draper MD    
labs  Continue HD while inpatient-s/p HD 1/13 with 3 L net removed-next HD 1/15    2.  Anemia with CKD/ESRD  Hemoglobin target 10-12  Hemoglobin 6.1--> 8.3-below target  S/p PRBCs  S/p c-scope/EGD 7/18 - internal hemorrhoids, acute gastritis   S/p EGD 1/13-acute gastritis-biopsied, Schatzki ring  Transfuse for hemoglobin<7-as per primary service  Monitor H&H    3.  Hypertension with CKD/ESRD  BP goal<130/80  BP above goal  Monitor BPs    4.  Secondary hyperparathyroidism of renal origin   and phosphorus 9.4 on 1/6 in the outpatient setting  Phosphorus 3.2 today  On calcium acetate  Low phosphorus diet  Monitor labs    KEMI Lewis - CNP    Patient seen and examined all key components of the physical performed independently , case discussed with NP, all pertinent labs and radiologic tests personally reviewed agree with above.      Elias Draper MD

## 2025-01-15 NOTE — PLAN OF CARE
Problem: Chronic Conditions and Co-morbidities  Goal: Patient's chronic conditions and co-morbidity symptoms are monitored and maintained or improved  1/15/2025 0542 by Giovana Phillips RN  Outcome: Progressing  1/15/2025 0035 by Giovana Phillips RN  Outcome: Progressing     Problem: Discharge Planning  Goal: Discharge to home or other facility with appropriate resources  1/15/2025 0542 by Giovana Phillips RN  Outcome: Progressing  1/15/2025 0035 by Giovana Phillips RN  Outcome: Progressing     Problem: Safety - Adult  Goal: Free from fall injury  1/15/2025 0542 by Giovana Phillips RN  Outcome: Progressing  1/15/2025 0035 by Giovana Phillips RN  Outcome: Progressing     Problem: ABCDS Injury Assessment  Goal: Absence of physical injury  1/15/2025 0542 by Giovana Phillips RN  Outcome: Progressing  1/15/2025 0035 by Giovana Phillips RN  Outcome: Progressing     Problem: Pain  Goal: Verbalizes/displays adequate comfort level or baseline comfort level  1/15/2025 0542 by Giovana Phillips RN  Outcome: Progressing  1/15/2025 0035 by Giovana Phillips RN  Outcome: Progressing

## 2025-01-15 NOTE — DISCHARGE SUMMARY
provider to review them with you.                STOP taking these medications      diphenhydrAMINE 25 MG tablet  Commonly known as: Benadryl Allergy     loratadine 10 MG tablet  Commonly known as: Claritin     neomycin-bacitracin-polymyxin 5-400-5000 ointment     olopatadine 0.1 % ophthalmic solution  Commonly known as: PATANOL     Probiotic Daily Caps               Where to Get Your Medications        These medications were sent to [facility] - [place] [address] [address] - P [phone] - F [phone]  [facility], [facility] [address]      Phone: [phone]   calcium acetate 667 MG Caps capsule  fluticasone 50 MCG/ACT nasal spray  pantoprazole 40 MG tablet  sucralfate 1 GM tablet           Note that more than 30 minutes was spent in preparing discharge papers, discussing discharge with patient, medication review, etc.    Signed:  Electronically signed by Krystyna Salcido MD on 1/14/2025 at 9:48 AM  
spray  Commonly known as: FLONASE  2 sprays by Each Nostril route daily     sucralfate 1 GM tablet  Commonly known as: CARAFATE  Take 1 tablet by mouth 4 times daily (before meals and nightly)            CHANGE how you take these medications      carvedilol 25 MG tablet  Commonly known as: COREG  TAKE 1 TABLET BY MOUTH DAILY WITH SUPPER  What changed: when to take this     insulin lispro (1 Unit Dial) 100 UNIT/ML Sopn  Commonly known as: HumaLOG KwikPen  Inject 4 Units into the skin 3 times daily (before meals) *Plus Sliding Scale* max daily dose 50u  What changed: how much to take            CONTINUE taking these medications      Accu-Chek FastClix Lancets Misc  Use to test blood sugars up to 4 times a day     Accu-Chek Guide strip  Generic drug: blood glucose test strips  Use to test blood sugar up to 4 times daily as needed.     Accu-Chek Guide w/Device Kit  Use to test blood sugars     acetaminophen 500 MG tablet  Commonly known as: TYLENOL     * albuterol (5 MG/ML) 0.5% nebulizer solution  Commonly known as: PROVENTIL  Take 1 mL by nebulization 4 times daily as needed for Wheezing     * albuterol sulfate  (90 Base) MCG/ACT inhaler  Commonly known as: PROVENTIL;VENTOLIN;PROAIR  Inhale 2 puffs into the lungs every 4-6 hours as needed for Wheezing or Shortness of Breath     Alcohol Prep Pads  Use as needed     Benzoyl Peroxide 10 % external wash  Commonly known as: BENZAC AC     calcium acetate 667 MG Caps capsule  Commonly known as: PHOSLO  Take 3 capsules by mouth 3 times daily (with meals)     clopidogrel 75 MG tablet  Commonly known as: PLAVIX     Daily Bao Tabs     Dexcom G6 Transmitter Misc  Use with sensors     Glucagon Emergency 1 MG Kit  Inject 1 mg as directed as needed (for blood glucose level <50)     glucose 40 % Gel  Commonly known as: GLUTOSE     Insulin Pen Needle 32G X 4 MM Misc  1 each by Does not apply route 4 times daily     NovoLOG FlexPen 100 UNIT/ML injection pen  Generic drug:

## 2025-01-15 NOTE — PLAN OF CARE
Problem: Chronic Conditions and Co-morbidities  Goal: Patient's chronic conditions and co-morbidity symptoms are monitored and maintained or improved  1/15/2025 0035 by Giovana Phillips RN  Outcome: Progressing  1/14/2025 1340 by Mabel Sanders RN  Outcome: Progressing  Flowsheets (Taken 1/14/2025 0734 by Kimberly Hobson, RN)  Care Plan - Patient's Chronic Conditions and Co-Morbidity Symptoms are Monitored and Maintained or Improved:   Monitor and assess patient's chronic conditions and comorbid symptoms for stability, deterioration, or improvement   Collaborate with multidisciplinary team to address chronic and comorbid conditions and prevent exacerbation or deterioration     Problem: Discharge Planning  Goal: Discharge to home or other facility with appropriate resources  1/15/2025 0035 by Giovana Phillips RN  Outcome: Progressing  1/14/2025 1340 by Mabel Sanders RN  Outcome: Progressing  Flowsheets (Taken 1/14/2025 0734 by Kimberly Hobson RN)  Discharge to home or other facility with appropriate resources: Identify barriers to discharge with patient and caregiver     Problem: Safety - Adult  Goal: Free from fall injury  1/15/2025 0035 by Giovana Phillips RN  Outcome: Progressing  1/14/2025 1340 by Mabel Sanders RN  Outcome: Progressing     Problem: ABCDS Injury Assessment  Goal: Absence of physical injury  1/15/2025 0035 by Giovana Phillips RN  Outcome: Progressing  1/14/2025 1340 by Mabel Sanders RN  Outcome: Progressing  Flowsheets (Taken 1/14/2025 0734 by Kimberly Hobson, RN)  Absence of Physical Injury: Implement safety measures based on patient assessment     Problem: Pain  Goal: Verbalizes/displays adequate comfort level or baseline comfort level  1/15/2025 0035 by Giovana Phillips RN  Outcome: Progressing  1/14/2025 1340 by Mabel Sanders RN  Outcome: Progressing

## 2025-01-16 ENCOUNTER — CARE COORDINATION (OUTPATIENT)
Dept: CARE COORDINATION | Age: 40
End: 2025-01-16

## 2025-01-16 PROBLEM — K22.2 SCHATZKI'S RING OF DISTAL ESOPHAGUS: Status: ACTIVE | Noted: 2025-01-16

## 2025-01-16 PROBLEM — K29.00 ACUTE SUPERFICIAL GASTRITIS WITHOUT HEMORRHAGE: Status: ACTIVE | Noted: 2025-01-16

## 2025-01-16 LAB — SURGICAL PATHOLOGY REPORT: NORMAL

## 2025-01-17 ENCOUNTER — CARE COORDINATION (OUTPATIENT)
Dept: CARE COORDINATION | Age: 40
End: 2025-01-17

## 2025-01-17 ENCOUNTER — TELEPHONE (OUTPATIENT)
Dept: PHARMACY | Facility: CLINIC | Age: 40
End: 2025-01-17

## 2025-01-17 DIAGNOSIS — I10 HYPERTENSION, UNSPECIFIED TYPE: ICD-10-CM

## 2025-01-17 DIAGNOSIS — I50.22 CHRONIC SYSTOLIC (CONGESTIVE) HEART FAILURE (HCC): Primary | ICD-10-CM

## 2025-01-17 NOTE — TELEPHONE ENCOUNTER
CLINICAL PHARMACY NOTE - [facility] Pharmacy Referral    Patient can be scheduled with:  Team Schedule- General Referrals, etc.    Received a referral from: Care Coordinator to discuss patient’s medications. Called patient to schedule a time to speak with a pharmacist over the telephone.     Pharmacy referral requested for complete med review. Pt dc'd from [facility] on 1/15/25 for anemia. Pt would benefit from a thorough med review and is receptive to a call from a pharmacist. Polypharmacy and several prescribing providers.       No answer left VM: Please contact us at  [phone] option 1 to schedule this appointment.    Sapna Vogel CPhT.   [facility] Clinical   [facility]  Toll free: [phone] Option 1

## 2025-01-17 NOTE — PROGRESS NOTES
Remote Patient Monitoring Treatment Plan    Received request from [facility]/Aixa Flanagan, JENNIFER   to order remote patient monitoring for in home monitoring of CHF; condition managed by Dr. Rodríguez (PCP). HTN (see dx from IP H&P/discharge summary); condition managed by Dr. Rodríguez (PCP) and order completed. Patient is on hemodialysis and home oxygen therapy.    Patient will be monitoring blood pressure   pulse ox   survey questions  disease specific education modules .      Patient will engage in Remote Patient Monitoring each day to develop the skills necessary for self management.       RPM Care Team Responsibilities:   Alerts will be reviewed daily and addressed within 2-4 hours during operational hours (Monday -Friday 9 am-4 pm)  Alert response and intervention documented in patient medical record  Alert response escalated to PCP per protocol and documented in patient medical record  Patient monitored over approximately  days  Discharge from program based on self-management readiness    See care coordination encounters for additional details.

## 2025-01-17 NOTE — CARE COORDINATION
Care Transitions Note    Initial Call - Call within 2 business days of discharge: Yes    Patient Current Location:  Home: [address] [place] [id number]    Care Transition Nurse contacted the patient by telephone to perform post hospital discharge assessment, verified name and  as identifiers. Provided introduction to self, and explanation of the Care Transition Nurse role.     Patient: Michelle Nettles    Patient : 1985   MRN: [id number]    Reason for Admission: Anemia  Discharge Date: 1/15/25  RURS: Readmission Risk Score: 23.6      Last Discharge Facility       Date Complaint Diagnosis Description Type Department Provider    1/10/25 Shortness of Breath Anemia, unspecified type ... ED to Hosp-Admission (Discharged) (ADMITTED) [facility] Della Nguyen MD; Matthias Lamb MD...            Was this an external facility discharge? No    Additional needs identified to be addressed with provider   Pt requesting a stool softner             Method of communication with provider: chart routing.    Patients top risk factors for readmission: lack of knowledge about disease, level of motivation, medical condition-DM, Anemia, ESRD-HD, CHF, O2 dependent, chronic resp failure, HTN, multiple health system providers, polypharmacy, support system, and utilization of services    Interventions to address risk factors:   Attend HFU appt with pcp on 25~Reviewed with pt and she is aware  Schedule a HFU appt with Dr. Owen (gen surg) and Dr. Loya (ENT) as advised at discharge.~Reviewed with pt and she is aware and has contact numbers on her dc AVS  Attend HD days as scheduled 3d/wk (TTHS per chart review) @ [facility] @ 11am.  Monitor hgb~f/u with pcp or monitor at HD  Monitor for any recurrent s/sx anemia~Reviewed with pt. To contact pcp with any symptoms/concerns    Care Summary Note: CTN called and spoke with the pt for an initial care transition call post hospital discharge. Pt admitted for anemia. Hgb

## 2025-01-20 ENCOUNTER — CARE COORDINATION (OUTPATIENT)
Dept: PRIMARY CARE CLINIC | Age: 40
End: 2025-01-20

## 2025-01-20 NOTE — CARE COORDINATION
Remote Patient Kit Ordering Note      Date/Time:  1/20/2025 9:14 AM      [facility]/family today to notify of RPM kit order; patient/family was unavailable; Left HIPAA compliant voicemail regarding RPM kit.    [x] [facility] address  [x] Patient will receive package over the next 1-3 business days. Someone 21 years or older must be present to sign for UPS delivery.  [x] HRS will contact patient within 24 hours, an HRS  will call the patient directly: If the patient does not answer, HRS will follow up with the clinical team notifying them about the unsuccessful attempt to contact the patient. HRS will make three call attempts to the patient.Provide patient with [facility] Virtual install number is: [phone].  [x] The RPM Nurse will contact patient once equipment is active to welcome them to the program.                                                         [x] Hours of RPM monitoring - Monday-Friday [id number]; encourage patient to get vitals entered by Noon each day to have the alert addressed same day.  [x][facility] mailed [facility] Patient flyer to patient.                      ACM made aware the RPM kit has been ordered.

## 2025-01-21 NOTE — TELEPHONE ENCOUNTER
2nd Attempt Documentation:  2nd attempt to contact this patient regarding the previous message  CLINICAL PHARMACY: REFERRAL  Patient unavailable at the time of call. Left following message on home TAD: please call back at toll-free [phone] to retrieve previous message.    Contribhart message sent to patient.  If unread, letter will be mailed to home.     Sapna Vogel CPhT.   Population Health Clinical   [facility] Clinical Pharmacy  Toll free: [phone] Option 1     For Pharmacy Admin Tracking Only    Program: [place]  CPA in place:  No  Gap Closed?: No   Time Spent (min): 15

## 2025-01-22 ENCOUNTER — CARE COORDINATION (OUTPATIENT)
Dept: CARE COORDINATION | Age: 40
End: 2025-01-22

## 2025-01-22 LAB
EKG ATRIAL RATE: 116 BPM
EKG P AXIS: 56 DEGREES
EKG P-R INTERVAL: 118 MS
EKG Q-T INTERVAL: 334 MS
EKG QRS DURATION: 66 MS
EKG QTC CALCULATION (BAZETT): 464 MS
EKG R AXIS: 3 DEGREES
EKG T AXIS: 94 DEGREES
EKG VENTRICULAR RATE: 116 BPM

## 2025-01-22 RX ORDER — DOCUSATE SODIUM 100 MG/1
100 CAPSULE, LIQUID FILLED ORAL DAILY
Qty: 90 CAPSULE | Refills: 0 | Status: SHIPPED | OUTPATIENT
Start: 2025-01-22

## 2025-01-22 NOTE — CARE COORDINATION
Care Transitions Note    Follow Up Call     Outreach Attempts:   1st attempt to reach the patient for subsequent Care Transition call. Pt answered but stated that she was unavailable to complete the call and requested that this CTN attempt to call her tomorrow.    CTN will attempt to reach the pt tomorrow.    CTN notes pt was a no show for her HFU appt with her pcp scheduled on 1/21/25.    Follow Up Appointment:   Future Appointments         Provider Specialty Dept Phone    1/28/2025 10:30 AM Brennan Dyer MD Endocrinology [phone]    2/4/2025 9:30 AM Rogers Rodríguez MD Primary Care [phone]            Plan for follow-up call in 1-2 days based on severity of symptoms and risk factors. Plan for next call: Symptom check~Any recurrent sob? Any s/sx anemia? Constipation improving/resolved?   Pharm call~pt missed calls  Scheduled f/u with Dr. Loya and Dr. Brayden MCMANUS ear hearing improving?   RPM equipment received?     Aixa Johnson RN

## 2025-01-23 ENCOUNTER — CARE COORDINATION (OUTPATIENT)
Dept: CARE COORDINATION | Age: 40
End: 2025-01-23

## 2025-01-23 NOTE — CARE COORDINATION
Care Transitions Note    Follow Up Call     Outreach Attempts:   2nd attempt to reach the patient for subsequent Care Transition call. Pt answered, but stated she was unavailable to talk but would call this CTN back later when able.     No further outreaches will be attempted.    If no return call by the end of today, CTN will sign off and resolve the CT program and provide a warm handoff to Taras BIRMINGHAM, per CT/RPM workflow.       Follow Up Appointment:   Future Appointments         Provider Specialty Dept Phone    1/28/2025 10:30 AM Brennan Dyer MD Endocrinology [phone]    2/4/2025 9:30 AM Rogers Rodríguez MD Primary Care [phone]    2/20/2025 1:00 PM Balaji Gonzalez MD General Surgery [phone]            No further follow-up call indicated.    Aixa Johnson RN

## 2025-01-23 NOTE — CARE COORDINATION
-No return call from pt.  -CTN signing off and resolving CT program.  -Will provide warm handoff to Taras BIRMINGHAM, at this time to continue outreach attempts.    Patient enrolled in RPM for CHF  HTN and will be monitoring Activity, Blood Pressure, Medication, Pulse ox, Survey questions, and Disease specific education modules  daily.   Patient has graduated from Transitions of Care program (unable to reach) and will be transitioned to Taras BIRMINGHAM - [phone]  .   RPM team has been notified of transition in patients care.

## 2025-01-28 ENCOUNTER — OFFICE VISIT (OUTPATIENT)
Dept: ENDOCRINOLOGY | Age: 40
End: 2025-01-28
Payer: MEDICARE

## 2025-01-28 ENCOUNTER — CARE COORDINATION (OUTPATIENT)
Dept: CARE COORDINATION | Age: 40
End: 2025-01-28

## 2025-01-28 VITALS
DIASTOLIC BLOOD PRESSURE: 63 MMHG | BODY MASS INDEX: 30.64 KG/M2 | HEIGHT: 59 IN | SYSTOLIC BLOOD PRESSURE: 101 MMHG | OXYGEN SATURATION: 97 % | WEIGHT: 152 LBS | HEART RATE: 100 BPM | RESPIRATION RATE: 16 BRPM

## 2025-01-28 DIAGNOSIS — E10.22 TYPE 1 DIABETES MELLITUS WITH CHRONIC KIDNEY DISEASE ON CHRONIC DIALYSIS (HCC): ICD-10-CM

## 2025-01-28 DIAGNOSIS — Z99.2 TYPE 1 DIABETES MELLITUS WITH CHRONIC KIDNEY DISEASE ON CHRONIC DIALYSIS (HCC): ICD-10-CM

## 2025-01-28 DIAGNOSIS — E55.9 VITAMIN D DEFICIENCY: ICD-10-CM

## 2025-01-28 DIAGNOSIS — N18.6 TYPE 1 DIABETES MELLITUS WITH CHRONIC KIDNEY DISEASE ON CHRONIC DIALYSIS (HCC): ICD-10-CM

## 2025-01-28 DIAGNOSIS — E10.65 TYPE 1 DIABETES MELLITUS WITH HYPERGLYCEMIA (HCC): Primary | ICD-10-CM

## 2025-01-28 LAB — HBA1C MFR BLD: 6.4 %

## 2025-01-28 PROCEDURE — 2022F DILAT RTA XM EVC RTNOPTHY: CPT | Performed by: FAMILY MEDICINE

## 2025-01-28 PROCEDURE — 1111F DSCHRG MED/CURRENT MED MERGE: CPT | Performed by: FAMILY MEDICINE

## 2025-01-28 PROCEDURE — 4004F PT TOBACCO SCREEN RCVD TLK: CPT | Performed by: FAMILY MEDICINE

## 2025-01-28 PROCEDURE — 83036 HEMOGLOBIN GLYCOSYLATED A1C: CPT | Performed by: FAMILY MEDICINE

## 2025-01-28 PROCEDURE — G8427 DOCREV CUR MEDS BY ELIG CLIN: HCPCS | Performed by: FAMILY MEDICINE

## 2025-01-28 PROCEDURE — 95251 CONT GLUC MNTR ANALYSIS I&R: CPT | Performed by: FAMILY MEDICINE

## 2025-01-28 PROCEDURE — G8417 CALC BMI ABV UP PARAM F/U: HCPCS | Performed by: FAMILY MEDICINE

## 2025-01-28 PROCEDURE — 99214 OFFICE O/P EST MOD 30 MIN: CPT | Performed by: FAMILY MEDICINE

## 2025-01-28 PROCEDURE — 3044F HG A1C LEVEL LT 7.0%: CPT | Performed by: FAMILY MEDICINE

## 2025-01-28 RX ORDER — INSULIN PMP CART,AUT,G6/7,CNTR
EACH SUBCUTANEOUS
Qty: 10 EACH | Refills: 11 | Status: SHIPPED | OUTPATIENT
Start: 2025-01-28

## 2025-01-28 RX ORDER — IBUPROFEN 600 MG/1
1 TABLET ORAL PRN
Qty: 1 KIT | Refills: 5 | Status: SHIPPED | OUTPATIENT
Start: 2025-01-28

## 2025-01-28 RX ORDER — LANCETS
1 EACH MISCELLANEOUS 4 TIMES DAILY
Qty: 200 EACH | Refills: 5 | Status: SHIPPED | OUTPATIENT
Start: 2025-01-28

## 2025-01-28 RX ORDER — MULTIVIT,CALC,MINS/IRON/FOLIC 9MG-400MCG
TABLET ORAL
Qty: 100 TABLET | Refills: 5 | Status: SHIPPED | OUTPATIENT
Start: 2025-01-28

## 2025-01-28 NOTE — PROGRESS NOTES
08/06/24 66.2 kg (145 lb 15.1 oz)   07/26/24 63.2 kg (139 lb 6.4 oz)       Physical examination:  General: awake alert, oriented x3, no abnormal position or movements.  HEENT: normocephalic non-traumatic, no exophthalmos   Neck: supple, no LN enlargement, no thyromegaly, no thyroid tenderness, no JVD.  Pulm: Clear equal air entry no added sounds, no wheezing or rhonchi    CVS: S1 + S2, no murmur, no heave. Dorsalis pedis pulse palpable   Abd: soft lax, no tenderness, no organomegaly, audible bowel sounds.   Skin: warm, no lesions, no rash. No callus, no Ulcers, No acanthosis nigricans  Musculoskeletal: No back tenderness, no kyphosis/scoliosis    Neuro: CN intact, sensation normal bilateral , muscle power normal  Psych: normal mood, and affect    Review of Laboratory Data:  I personally reviewed the following lab:  Lab Results   Component Value Date/Time    WBC 5.6 01/15/2025 05:45 AM    RBC 2.45 (L) 01/15/2025 05:45 AM    RBC 2.92 (L) 02/22/2022 08:11 PM    HGB 7.8 (L) 01/15/2025 05:45 AM    HCT 23.6 (L) 01/15/2025 05:45 AM    MCV 96.3 01/15/2025 05:45 AM    MCH 31.8 01/15/2025 05:45 AM    MCHC 33.1 01/15/2025 05:45 AM    RDW 16.4 (H) 01/15/2025 05:45 AM     01/15/2025 05:45 AM    MPV 10.0 01/15/2025 05:45 AM    BANDS 2 05/01/2013 05:14 AM      Lab Results   Component Value Date/Time     01/15/2025 05:45 AM    K 4.9 01/15/2025 05:45 AM    K 5.2 (H) 06/30/2023 05:52 AM    CO2 28 01/15/2025 05:45 AM    BUN 39 (H) 01/15/2025 05:45 AM    CREATININE 7.4 (H) 01/15/2025 05:45 AM    CALCIUM 9.0 01/15/2025 05:45 AM    LABGLOM 7 (L) 01/15/2025 05:45 AM    LABGLOM 8 (L) 04/02/2024 11:30 AM    GFRAA 5 07/29/2022 01:14 AM      Lab Results   Component Value Date/Time    TSH 3.66 01/11/2025 06:34 AM    T4FREE 1.10 01/27/2021 09:52 PM     Lab Results   Component Value Date/Time    LABA1C 6.4 01/28/2025 11:08 AM    GLUCOSE 170 01/15/2025 05:45 AM    GLUCOSE 279 07/27/2011 04:40 PM    MALBCR 1988.2 10/08/2013 10:30 AM

## 2025-01-28 NOTE — CARE COORDINATION
Ambulatory Care Coordination Note     1/28/2025 4:26 PM     Patient outreach attempt by this ACM today to offer care management services. ACM was unable to reach the patient by telephone today;   left voice message requesting a return phone call to this ACM.  [employer] message sent requesting patient to contact this ACM.     ACM: Taras Khan RN     Care Summary Note: na    PCP/Specialist follow up:   Future Appointments         Provider Specialty Dept Phone    2/4/2025 9:30 AM Rogers Rodríguez MD Primary Care [phone]    2/20/2025 1:00 PM Balaji Gonzalez MD General Surgery [phone]    5/6/2025 12:30 PM Angelina Benitez, APRN - CNP Endocrinology [phone]            Follow Up:   Plan for next ACM outreach in approximately 1-2 days  to complete:  - outreach attempt to offer care management services.

## 2025-01-29 ENCOUNTER — CARE COORDINATION (OUTPATIENT)
Dept: CARE COORDINATION | Age: 40
End: 2025-01-29

## 2025-01-29 SDOH — HEALTH STABILITY: MENTAL HEALTH: HOW MANY STANDARD DRINKS CONTAINING ALCOHOL DO YOU HAVE ON A TYPICAL DAY?: PATIENT DOES NOT DRINK

## 2025-01-29 SDOH — SOCIAL STABILITY: SOCIAL NETWORK: HOW OFTEN DO YOU ATTEND CHURCH OR RELIGIOUS SERVICES?: MORE THAN 4 TIMES PER YEAR

## 2025-01-29 SDOH — HEALTH STABILITY: PHYSICAL HEALTH: ON AVERAGE, HOW MANY DAYS PER WEEK DO YOU ENGAGE IN MODERATE TO STRENUOUS EXERCISE (LIKE A BRISK WALK)?: 0 DAYS

## 2025-01-29 SDOH — ECONOMIC STABILITY: FOOD INSECURITY: WITHIN THE PAST 12 MONTHS, YOU WORRIED THAT YOUR FOOD WOULD RUN OUT BEFORE YOU GOT MONEY TO BUY MORE.: SOMETIMES TRUE

## 2025-01-29 SDOH — SOCIAL STABILITY: SOCIAL NETWORK
DO YOU BELONG TO ANY CLUBS OR ORGANIZATIONS SUCH AS CHURCH GROUPS UNIONS, FRATERNAL OR ATHLETIC GROUPS, OR SCHOOL GROUPS?: NO

## 2025-01-29 SDOH — ECONOMIC STABILITY: FOOD INSECURITY: WITHIN THE PAST 12 MONTHS, THE FOOD YOU BOUGHT JUST DIDN'T LAST AND YOU DIDN'T HAVE MONEY TO GET MORE.: NEVER TRUE

## 2025-01-29 SDOH — HEALTH STABILITY: MENTAL HEALTH
STRESS IS WHEN SOMEONE FEELS TENSE, NERVOUS, ANXIOUS, OR CAN'T SLEEP AT NIGHT BECAUSE THEIR MIND IS TROUBLED. HOW STRESSED ARE YOU?: ONLY A LITTLE

## 2025-01-29 SDOH — SOCIAL STABILITY: SOCIAL NETWORK: ARE YOU MARRIED, WIDOWED, DIVORCED, SEPARATED, NEVER MARRIED, OR LIVING WITH A PARTNER?: DIVORCED

## 2025-01-29 SDOH — ECONOMIC STABILITY: INCOME INSECURITY: HOW HARD IS IT FOR YOU TO PAY FOR THE VERY BASICS LIKE FOOD, HOUSING, MEDICAL CARE, AND HEATING?: SOMEWHAT HARD

## 2025-01-29 SDOH — SOCIAL STABILITY: SOCIAL NETWORK: HOW OFTEN DO YOU ATTENT MEETINGS OF THE CLUB OR ORGANIZATION YOU BELONG TO?: NEVER

## 2025-01-29 SDOH — ECONOMIC STABILITY: INCOME INSECURITY: IN THE LAST 12 MONTHS, WAS THERE A TIME WHEN YOU WERE NOT ABLE TO PAY THE MORTGAGE OR RENT ON TIME?: NO

## 2025-01-29 SDOH — HEALTH STABILITY: MENTAL HEALTH: HOW OFTEN DO YOU HAVE A DRINK CONTAINING ALCOHOL?: NEVER

## 2025-01-29 SDOH — SOCIAL STABILITY: SOCIAL NETWORK: HOW OFTEN DO YOU GET TOGETHER WITH FRIENDS OR RELATIVES?: MORE THAN THREE TIMES A WEEK

## 2025-01-29 SDOH — SOCIAL STABILITY: SOCIAL NETWORK
IN A TYPICAL WEEK, HOW MANY TIMES DO YOU TALK ON THE PHONE WITH FAMILY, FRIENDS, OR NEIGHBORS?: MORE THAN THREE TIMES A WEEK

## 2025-01-29 SDOH — ECONOMIC STABILITY: TRANSPORTATION INSECURITY
IN THE PAST 12 MONTHS, HAS THE LACK OF TRANSPORTATION KEPT YOU FROM MEDICAL APPOINTMENTS OR FROM GETTING MEDICATIONS?: NO

## 2025-01-29 SDOH — HEALTH STABILITY: PHYSICAL HEALTH: ON AVERAGE, HOW MANY MINUTES DO YOU ENGAGE IN EXERCISE AT THIS LEVEL?: 0 MIN

## 2025-01-31 NOTE — CARE COORDINATION
Ambulatory Care Coordination Note     2025 9:40 AM     Patient Current Location:  Home: [facility] [address]  [place] [address]     This patient was received as a referral from Care Transition Nurse.    Patient contacted the ACM by telephone. Verified name and  with patient as identifiers. Provided introduction to self, and explanation of the ACM role.   Patient accepted care management services at this time.          ACM: Taras Khan RN     Challenges to be reviewed by the provider   Additional needs identified to be addressed with provider No  none               Method of communication with provider: none.    Utilization: Initial Call - N/A    Care Summary Note: Michelle returned [facility]'s phone call.  She states she is a HD patient and attends sessions  at [facility].  She has a continuous glucose monitor.  She is in the process of having safety rails installed in her bathroom.  She is also working on getting a walker and wheelchair.  She notes marked lethargy on dialysis days.      Offered patient enrollment in the Remote Patient Monitoring (RPM) program for in-home monitoring: Yes, patient enrolled; current status is awaiting kit.    Remote Patient Monitoring Enrollment Note      Date/Time:  2025 10:12 AM    Offered patient enrollment in the [facility] Remote Patient Monitoring (RPM) program for in home monitoring for Kidney Disease; condition managed by . CHF; condition managed by Dr. Rodríguez. COPD; condition managed by Dr. Rodríguez. Diabetes; condition managed by Dr. Dyer. Hypotension; condition managed by Dr. Rodríguez and [facility].  Systolic blood pressure parameter 90, diastolic blood pressure parameter 50, heart rate parameter 50..  Patient accepted RPM services.    Patient will be monitoring the following daily:  blood pressure reading, glucose reading, pulse ox reading, and weight      ACM reviewed the information below with patient:    Emergency Contact (name and contact number):

## 2025-02-04 ENCOUNTER — PATIENT MESSAGE (OUTPATIENT)
Dept: PRIMARY CARE CLINIC | Age: 40
End: 2025-02-04

## 2025-02-04 ENCOUNTER — OFFICE VISIT (OUTPATIENT)
Dept: PRIMARY CARE CLINIC | Age: 40
End: 2025-02-04
Payer: MEDICARE

## 2025-02-04 VITALS
DIASTOLIC BLOOD PRESSURE: 77 MMHG | OXYGEN SATURATION: 100 % | SYSTOLIC BLOOD PRESSURE: 139 MMHG | BODY MASS INDEX: 30.64 KG/M2 | HEIGHT: 59 IN | RESPIRATION RATE: 16 BRPM | HEART RATE: 105 BPM | TEMPERATURE: 97.7 F | WEIGHT: 152 LBS

## 2025-02-04 DIAGNOSIS — N18.6 ESRD ON HEMODIALYSIS (HCC): ICD-10-CM

## 2025-02-04 DIAGNOSIS — Z99.2 ESRD ON HEMODIALYSIS (HCC): ICD-10-CM

## 2025-02-04 DIAGNOSIS — J96.11 CHRONIC HYPOXIC RESPIRATORY FAILURE, ON HOME OXYGEN THERAPY: ICD-10-CM

## 2025-02-04 DIAGNOSIS — I50.22 CHRONIC SYSTOLIC (CONGESTIVE) HEART FAILURE (HCC): ICD-10-CM

## 2025-02-04 DIAGNOSIS — Z09 HOSPITAL DISCHARGE FOLLOW-UP: Primary | ICD-10-CM

## 2025-02-04 DIAGNOSIS — Z99.81 CHRONIC HYPOXIC RESPIRATORY FAILURE, ON HOME OXYGEN THERAPY: ICD-10-CM

## 2025-02-04 DIAGNOSIS — D64.9 ACUTE ON CHRONIC ANEMIA: ICD-10-CM

## 2025-02-04 DIAGNOSIS — E53.9 VITAMIN B DEFICIENCY, UNSPECIFIED: ICD-10-CM

## 2025-02-04 PROCEDURE — 99214 OFFICE O/P EST MOD 30 MIN: CPT | Performed by: FAMILY MEDICINE

## 2025-02-04 PROCEDURE — 4004F PT TOBACCO SCREEN RCVD TLK: CPT | Performed by: FAMILY MEDICINE

## 2025-02-04 PROCEDURE — 1111F DSCHRG MED/CURRENT MED MERGE: CPT | Performed by: FAMILY MEDICINE

## 2025-02-04 PROCEDURE — G8427 DOCREV CUR MEDS BY ELIG CLIN: HCPCS | Performed by: FAMILY MEDICINE

## 2025-02-04 PROCEDURE — G8417 CALC BMI ABV UP PARAM F/U: HCPCS | Performed by: FAMILY MEDICINE

## 2025-02-04 RX ORDER — MULTIVITAMIN WITH IRON
500 TABLET ORAL DAILY
Qty: 90 TABLET | Refills: 1 | Status: ON HOLD | OUTPATIENT
Start: 2025-02-04

## 2025-02-04 RX ORDER — FERROUS SULFATE 325(65) MG
325 TABLET ORAL EVERY OTHER DAY
Qty: 45 TABLET | Refills: 1 | Status: ON HOLD | OUTPATIENT
Start: 2025-02-04

## 2025-02-04 RX ORDER — PANTOPRAZOLE SODIUM 40 MG/1
40 TABLET, DELAYED RELEASE ORAL
Qty: 30 TABLET | Refills: 2 | Status: ON HOLD | OUTPATIENT
Start: 2025-02-04

## 2025-02-04 RX ORDER — CLOPIDOGREL BISULFATE 75 MG/1
75 TABLET ORAL DAILY
Qty: 90 TABLET | Refills: 1 | Status: SHIPPED | OUTPATIENT
Start: 2025-02-04

## 2025-02-04 ASSESSMENT — PATIENT HEALTH QUESTIONNAIRE - PHQ9
2. FEELING DOWN, DEPRESSED OR HOPELESS: NOT AT ALL
SUM OF ALL RESPONSES TO PHQ9 QUESTIONS 1 & 2: 0
1. LITTLE INTEREST OR PLEASURE IN DOING THINGS: NOT AT ALL
SUM OF ALL RESPONSES TO PHQ QUESTIONS 1-9: 0

## 2025-02-04 NOTE — PROGRESS NOTES
Post-Discharge Transitional Care Follow Up      Michelletrevor Nettles   YOB: 1985    Date of Office Visit:  2/4/2025  Date of Hospital Admission: 1/10/25  Date of Hospital Discharge: 1/15/25  Readmission Risk Score (high >=14%. Medium >=10%):Readmission Risk Score: 23.6      Care management risk score Rising risk (score 2-5) and Complex Care (Scores >=6): No Risk Score On File     Non face to face  following discharge, date last encounter closed (first attempt may have been earlier): *No documented post hospital discharge outreach found in the last 14 days     Call initiated 2 business days of discharge: *No response recorded in the last 14 days     Hospital discharge follow-up  -     [place] DISCHARGE MEDS RECONCILED W/ CURRENT OUTPATIENT MED LIST  Acute on chronic anemia  -     ferrous sulfate (IRON 325) 325 (65 Fe) MG tablet; Take 1 tablet by mouth every other day, Disp-45 tablet, R-1Normal  -     pantoprazole (PROTONIX) 40 MG tablet; Take 1 tablet by mouth 2 times daily (before meals), Disp-30 tablet, R-2Normal  Chronic hypoxic respiratory failure, on home oxygen therapy  -     Blood Pressure Monitoring (BLOOD PRESSURE KIT) LIANNE; Check BP daily, Disp-1 each, R-0Normal  -     Misc. Devices (PULSE OXIMETER FOR FINGER) MISC; Check HR and pulse ox daily and PRN symptoms, Disp-1 each, R-0Normal  ESRD on hemodialysis (HCC)  -     Blood Pressure Monitoring (BLOOD PRESSURE KIT) LIANNE; Check BP daily, Disp-1 each, R-0Normal  -     Misc. Devices (PULSE OXIMETER FOR FINGER) MISC; Check HR and pulse ox daily and PRN symptoms, Disp-1 each, R-0Normal  Chronic systolic (congestive) heart failure  -     Blood Pressure Monitoring (BLOOD PRESSURE KIT) LIANNE; Check BP daily, Disp-1 each, R-0Normal  -     Misc. Devices (PULSE OXIMETER FOR FINGER) MISC; Check HR and pulse ox daily and PRN symptoms, Disp-1 each, R-0Normal  Vitamin B deficiency, unspecified  -     vitamin B-12 (CYANOCOBALAMIN) 500 MCG tablet; Take 1

## 2025-02-04 NOTE — TELEPHONE ENCOUNTER
Please notify me if pulse less than 50 or greater than 120, systolic BP less than 90 or greater than 170, diastolic BP less than 50 or greater than 100.

## 2025-02-04 NOTE — TELEPHONE ENCOUNTER
Last Appointment:  2/4/2025  Future Appointments   Date Time Provider Department Center   2/27/2025  1:00 PM Balaji Gonzalez MD ACC Surgical [facility]   5/6/2025 12:30 PM Angelina Benitez APRN - CNP BD ENDO [facility]   6/5/2025 10:00 AM Rogers Rodríguez MD [facility]

## 2025-02-05 DIAGNOSIS — Z99.2 ESRD ON HEMODIALYSIS (HCC): ICD-10-CM

## 2025-02-05 DIAGNOSIS — N18.6 ESRD ON HEMODIALYSIS (HCC): ICD-10-CM

## 2025-02-05 DIAGNOSIS — I50.22 CHRONIC SYSTOLIC (CONGESTIVE) HEART FAILURE (HCC): Primary | ICD-10-CM

## 2025-02-05 DIAGNOSIS — E10.65 TYPE 1 DIABETES MELLITUS WITH HYPERGLYCEMIA (HCC): ICD-10-CM

## 2025-02-05 DIAGNOSIS — I95.9 HYPOTENSION, UNSPECIFIED HYPOTENSION TYPE: ICD-10-CM

## 2025-02-05 NOTE — PROGRESS NOTES
Remote Patient Monitoring Treatment Plan    Received request from ACM/CTTaras Langley, RN   to order remote patient monitoring for in home monitoring of Kidney Disease; Condition managed by PCP.  CHF; Condition managed by PCP.  Diabetes; Condition managed by Dr. Brennan Dyer ( [facility]).  Hypotension; Condition managed by PCP.  and order completed.     Patient will be monitoring blood pressure   glucose  pulse ox   weight.     Parameters for hypotension are as follows: Systolic blood pressure parameter < 90, heart rate parameter < 50. There is no low diastolic BP option on RPM.     Patient will engage in Remote Patient Monitoring each day to develop the skills necessary for self management.       RPM Care Team Responsibilities:   Alerts will be reviewed daily and addressed within 2-4 hours during operational hours (Monday -Friday 9 am-4 pm)  Alert response and intervention documented in patient medical record  Alert response escalated to PCP per protocol and documented in patient medical record  Patient monitored over approximately  days  Discharge from program based on self-management readiness    See care coordination encounters for additional details.

## 2025-02-10 ASSESSMENT — ENCOUNTER SYMPTOMS
WHEEZING: 0
CHEST TIGHTNESS: 0
COUGH: 0
GASTROINTESTINAL NEGATIVE: 1
SHORTNESS OF BREATH: 1
BLOOD IN STOOL: 0
ABDOMINAL PAIN: 0

## 2025-02-14 ENCOUNTER — HOSPITAL ENCOUNTER (INPATIENT)
Age: 40
LOS: 5 days | Discharge: NURSING, REHAB OR LONG-TERM CARE | DRG: 492 | End: 2025-02-21
Attending: EMERGENCY MEDICINE | Admitting: STUDENT IN AN ORGANIZED HEALTH CARE EDUCATION/TRAINING PROGRAM
Payer: MEDICARE

## 2025-02-14 ENCOUNTER — APPOINTMENT (OUTPATIENT)
Dept: CT IMAGING | Age: 40
DRG: 492 | End: 2025-02-14
Payer: MEDICARE

## 2025-02-14 DIAGNOSIS — N18.6 TYPE 1 DIABETES MELLITUS WITH CHRONIC KIDNEY DISEASE ON CHRONIC DIALYSIS (HCC): ICD-10-CM

## 2025-02-14 DIAGNOSIS — Z99.2 TYPE 1 DIABETES MELLITUS WITH CHRONIC KIDNEY DISEASE ON CHRONIC DIALYSIS (HCC): ICD-10-CM

## 2025-02-14 DIAGNOSIS — S82.852A CLOSED TRIMALLEOLAR FRACTURE OF LEFT ANKLE, INITIAL ENCOUNTER: Primary | ICD-10-CM

## 2025-02-14 DIAGNOSIS — Z99.2 ANEMIA DUE TO CHRONIC KIDNEY DISEASE, ON CHRONIC DIALYSIS (HCC): ICD-10-CM

## 2025-02-14 DIAGNOSIS — Z99.81 OXYGEN DEPENDENT: ICD-10-CM

## 2025-02-14 DIAGNOSIS — R55 SYNCOPE AND COLLAPSE: ICD-10-CM

## 2025-02-14 DIAGNOSIS — N18.6 ANEMIA DUE TO CHRONIC KIDNEY DISEASE, ON CHRONIC DIALYSIS (HCC): ICD-10-CM

## 2025-02-14 DIAGNOSIS — N18.6 ESRD (END STAGE RENAL DISEASE) (HCC): ICD-10-CM

## 2025-02-14 DIAGNOSIS — E10.22 TYPE 1 DIABETES MELLITUS WITH CHRONIC KIDNEY DISEASE ON CHRONIC DIALYSIS (HCC): ICD-10-CM

## 2025-02-14 DIAGNOSIS — D63.1 ANEMIA DUE TO CHRONIC KIDNEY DISEASE, ON CHRONIC DIALYSIS (HCC): ICD-10-CM

## 2025-02-14 LAB
ALBUMIN SERPL-MCNC: 3.7 G/DL (ref 3.5–5.2)
ALP SERPL-CCNC: 121 U/L (ref 35–104)
ALT SERPL-CCNC: 11 U/L (ref 0–32)
ANION GAP SERPL CALCULATED.3IONS-SCNC: 12 MMOL/L (ref 7–16)
AST SERPL-CCNC: 20 U/L (ref 0–31)
BASOPHILS # BLD: 0.03 K/UL (ref 0–0.2)
BASOPHILS NFR BLD: 1 % (ref 0–2)
BILIRUB SERPL-MCNC: 0.3 MG/DL (ref 0–1.2)
BUN SERPL-MCNC: 28 MG/DL (ref 6–20)
CALCIUM SERPL-MCNC: 9.1 MG/DL (ref 8.6–10.2)
CHLORIDE SERPL-SCNC: 94 MMOL/L (ref 98–107)
CO2 SERPL-SCNC: 31 MMOL/L (ref 22–29)
CREAT SERPL-MCNC: 7.6 MG/DL (ref 0.5–1)
EKG ATRIAL RATE: 105 BPM
EKG P AXIS: 49 DEGREES
EKG P-R INTERVAL: 132 MS
EKG Q-T INTERVAL: 338 MS
EKG QRS DURATION: 72 MS
EKG QTC CALCULATION (BAZETT): 446 MS
EKG R AXIS: 2 DEGREES
EKG T AXIS: 148 DEGREES
EKG VENTRICULAR RATE: 105 BPM
EOSINOPHIL # BLD: 0.16 K/UL (ref 0.05–0.5)
EOSINOPHILS RELATIVE PERCENT: 2 % (ref 0–6)
ERYTHROCYTE [DISTWIDTH] IN BLOOD BY AUTOMATED COUNT: 15.1 % (ref 11.5–15)
FLUAV RNA RESP QL NAA+PROBE: NOT DETECTED
FLUBV RNA RESP QL NAA+PROBE: NOT DETECTED
GFR, ESTIMATED: 6 ML/MIN/1.73M2
GLUCOSE SERPL-MCNC: 264 MG/DL (ref 74–99)
HCG SERPL QL: NEGATIVE
HCT VFR BLD AUTO: 22.5 % (ref 34–48)
HGB BLD-MCNC: 7.6 G/DL (ref 11.5–15.5)
IMM GRANULOCYTES # BLD AUTO: <0.03 K/UL (ref 0–0.58)
IMM GRANULOCYTES NFR BLD: 0 % (ref 0–5)
LYMPHOCYTES NFR BLD: 0.72 K/UL (ref 1.5–4)
LYMPHOCYTES RELATIVE PERCENT: 11 % (ref 20–42)
MAGNESIUM SERPL-MCNC: 2.1 MG/DL (ref 1.6–2.6)
MCH RBC QN AUTO: 31.7 PG (ref 26–35)
MCHC RBC AUTO-ENTMCNC: 33.8 G/DL (ref 32–34.5)
MCV RBC AUTO: 93.8 FL (ref 80–99.9)
MONOCYTES NFR BLD: 0.64 K/UL (ref 0.1–0.95)
MONOCYTES NFR BLD: 10 % (ref 2–12)
NEUTROPHILS NFR BLD: 76 % (ref 43–80)
NEUTS SEG NFR BLD: 5 K/UL (ref 1.8–7.3)
PLATELET # BLD AUTO: 218 K/UL (ref 130–450)
PMV BLD AUTO: 8.8 FL (ref 7–12)
POTASSIUM SERPL-SCNC: 3.1 MMOL/L (ref 3.5–5)
PROT SERPL-MCNC: 7.5 G/DL (ref 6.4–8.3)
RBC # BLD AUTO: 2.4 M/UL (ref 3.5–5.5)
RSV BY PCR: NOT DETECTED
SARS-COV-2 RNA RESP QL NAA+PROBE: NOT DETECTED
SODIUM SERPL-SCNC: 137 MMOL/L (ref 132–146)
SOURCE: NORMAL
SPECIMEN DESCRIPTION: NORMAL
SPECIMEN SOURCE: NORMAL
TROPONIN I SERPL HS-MCNC: 107 NG/L (ref 0–9)
TROPONIN I SERPL HS-MCNC: 110 NG/L (ref 0–9)
WBC OTHER # BLD: 6.6 K/UL (ref 4.5–11.5)

## 2025-02-14 PROCEDURE — 85025 COMPLETE CBC W/AUTO DIFF WBC: CPT

## 2025-02-14 PROCEDURE — 87636 SARSCOV2 & INF A&B AMP PRB: CPT

## 2025-02-14 PROCEDURE — 6370000000 HC RX 637 (ALT 250 FOR IP): Performed by: NURSE PRACTITIONER

## 2025-02-14 PROCEDURE — 96376 TX/PRO/DX INJ SAME DRUG ADON: CPT

## 2025-02-14 PROCEDURE — 84484 ASSAY OF TROPONIN QUANT: CPT

## 2025-02-14 PROCEDURE — 29515 APPLICATION SHORT LEG SPLINT: CPT

## 2025-02-14 PROCEDURE — 71045 X-RAY EXAM CHEST 1 VIEW: CPT

## 2025-02-14 PROCEDURE — 2500000003 HC RX 250 WO HCPCS: Performed by: NURSE PRACTITIONER

## 2025-02-14 PROCEDURE — 80053 COMPREHEN METABOLIC PANEL: CPT

## 2025-02-14 PROCEDURE — 96375 TX/PRO/DX INJ NEW DRUG ADDON: CPT

## 2025-02-14 PROCEDURE — 70450 CT HEAD/BRAIN W/O DYE: CPT

## 2025-02-14 PROCEDURE — 6360000002 HC RX W HCPCS: Performed by: NURSE PRACTITIONER

## 2025-02-14 PROCEDURE — 96374 THER/PROPH/DIAG INJ IV PUSH: CPT

## 2025-02-14 PROCEDURE — 72125 CT NECK SPINE W/O DYE: CPT

## 2025-02-14 PROCEDURE — 2580000003 HC RX 258: Performed by: EMERGENCY MEDICINE

## 2025-02-14 PROCEDURE — 99285 EMERGENCY DEPT VISIT HI MDM: CPT

## 2025-02-14 PROCEDURE — 87634 RSV DNA/RNA AMP PROBE: CPT

## 2025-02-14 PROCEDURE — 83735 ASSAY OF MAGNESIUM: CPT

## 2025-02-14 PROCEDURE — 93005 ELECTROCARDIOGRAM TRACING: CPT | Performed by: NURSE PRACTITIONER

## 2025-02-14 PROCEDURE — 84703 CHORIONIC GONADOTROPIN ASSAY: CPT

## 2025-02-14 PROCEDURE — 96361 HYDRATE IV INFUSION ADD-ON: CPT

## 2025-02-14 PROCEDURE — 73610 X-RAY EXAM OF ANKLE: CPT

## 2025-02-14 PROCEDURE — 93010 ELECTROCARDIOGRAM REPORT: CPT | Performed by: INTERNAL MEDICINE

## 2025-02-14 RX ORDER — FENTANYL CITRATE 50 UG/ML
50 INJECTION, SOLUTION INTRAMUSCULAR; INTRAVENOUS ONCE
Status: COMPLETED | OUTPATIENT
Start: 2025-02-14 | End: 2025-02-14

## 2025-02-14 RX ORDER — HYDROMORPHONE HYDROCHLORIDE 1 MG/ML
1 INJECTION, SOLUTION INTRAMUSCULAR; INTRAVENOUS; SUBCUTANEOUS ONCE
Status: COMPLETED | OUTPATIENT
Start: 2025-02-14 | End: 2025-02-14

## 2025-02-14 RX ORDER — OXYCODONE AND ACETAMINOPHEN 5; 325 MG/1; MG/1
1 TABLET ORAL ONCE
Status: COMPLETED | OUTPATIENT
Start: 2025-02-14 | End: 2025-02-14

## 2025-02-14 RX ORDER — 0.9 % SODIUM CHLORIDE 0.9 %
500 INTRAVENOUS SOLUTION INTRAVENOUS ONCE
Status: COMPLETED | OUTPATIENT
Start: 2025-02-14 | End: 2025-02-14

## 2025-02-14 RX ORDER — FENTANYL CITRATE 50 UG/ML
50 INJECTION, SOLUTION INTRAMUSCULAR; INTRAVENOUS
Status: DISCONTINUED | OUTPATIENT
Start: 2025-02-14 | End: 2025-02-14

## 2025-02-14 RX ADMIN — FENTANYL CITRATE 50 MCG: 50 INJECTION INTRAMUSCULAR; INTRAVENOUS at 18:10

## 2025-02-14 RX ADMIN — OXYCODONE HYDROCHLORIDE AND ACETAMINOPHEN 1 TABLET: 5; 325 TABLET ORAL at 14:21

## 2025-02-14 RX ADMIN — SODIUM CHLORIDE 500 ML: 9 INJECTION, SOLUTION INTRAVENOUS at 14:20

## 2025-02-14 RX ADMIN — HYDROMORPHONE HYDROCHLORIDE 1 MG: 1 INJECTION, SOLUTION INTRAMUSCULAR; INTRAVENOUS; SUBCUTANEOUS at 20:31

## 2025-02-14 RX ADMIN — FENTANYL CITRATE 50 MCG: 50 INJECTION INTRAMUSCULAR; INTRAVENOUS at 15:40

## 2025-02-14 ASSESSMENT — PAIN DESCRIPTION - ORIENTATION
ORIENTATION: LEFT

## 2025-02-14 ASSESSMENT — PAIN DESCRIPTION - DESCRIPTORS
DESCRIPTORS: SHARP;SHOOTING
DESCRIPTORS: ACHING
DESCRIPTORS: THROBBING

## 2025-02-14 ASSESSMENT — PAIN SCALES - GENERAL
PAINLEVEL_OUTOF10: 10

## 2025-02-14 ASSESSMENT — PAIN DESCRIPTION - LOCATION
LOCATION: FOOT;ANKLE
LOCATION: ANKLE
LOCATION: FOOT

## 2025-02-14 ASSESSMENT — PAIN - FUNCTIONAL ASSESSMENT: PAIN_FUNCTIONAL_ASSESSMENT: 0-10

## 2025-02-14 ASSESSMENT — PAIN DESCRIPTION - FREQUENCY: FREQUENCY: CONTINUOUS

## 2025-02-14 NOTE — ED PROVIDER NOTES
Shared JAI-ED Attending Visit.  CC: No      [facility]  Department of Emergency Medicine   ED  Encounter Note  Admit Date/RoomTime: 2025 12:45 PM  ED Room:     NAME: Michelle Nettles  : 1985  MRN: [id number]     Chief Complaint:  Fall (Pt states she was coughing then woke up on the floor. C/O left ankle and foot pain. ), Loss of Consciousness, and Ankle Pain    History of Present Illness       Michelle Nettles is a 39 y.o. old female who presents to the emergency department by private vehicle, for complaints of sudden onset syncope, which occured 1 day(s) prior to arrival.  The event occurred while at home.  She states she had a coughing fit and the next thing she knew she was on the floor.  She was able to get up and went about her day however had persistent left ankle pain which prompted her visit today.  Prior to the event she complained of no other pertinent symptoms. There has been NO: Fever, chills, chest pain, dyspnea, hemoptysis, abdominal pain.  Of note she is end-stage renal on dialysis, she missed dialysis today secondary to not being able to ambulate due to her ankle pain.  She is chronically oxygen dependent 4 to 5 L nasal cannula at baseline.    ROS   Pertinent positives and negatives are stated within HPI, all other systems reviewed and are negative.    Past Medical History:  has a past medical history of JOLLY (acute kidney injury) ([facility]), AVF (arteriovenous fistula) ([facility]), Chronic kidney disease, Dialysis AV fistula malfunction, initial encounter ([facility]), DM type 1 (diabetes mellitus, type 1) ([facility]), Encounter regarding vascular access for dialysis for ESRD ([facility]), ESRD (end stage renal disease) ([facility]), Hemodialysis patient ([facility]), Hypertension, and Tobacco abuse.    Surgical History:  has a past surgical history that includes Dilation and curettage of uterus (); Breast cyst incision and drainage (2011); other surgical history (); fracture

## 2025-02-15 ENCOUNTER — APPOINTMENT (OUTPATIENT)
Dept: GENERAL RADIOLOGY | Age: 40
DRG: 492 | End: 2025-02-15
Payer: MEDICARE

## 2025-02-15 ENCOUNTER — ANESTHESIA (OUTPATIENT)
Dept: OPERATING ROOM | Age: 40
End: 2025-02-15
Payer: MEDICARE

## 2025-02-15 ENCOUNTER — ANESTHESIA EVENT (OUTPATIENT)
Dept: OPERATING ROOM | Age: 40
End: 2025-02-15
Payer: MEDICARE

## 2025-02-15 PROBLEM — S82.852A TRIMALLEOLAR FRACTURE OF ANKLE, CLOSED, LEFT, INITIAL ENCOUNTER: Status: ACTIVE | Noted: 2025-02-15

## 2025-02-15 PROBLEM — Z99.81 OXYGEN DEPENDENT: Status: ACTIVE | Noted: 2025-02-15

## 2025-02-15 PROBLEM — R55 SYNCOPE AND COLLAPSE: Status: ACTIVE | Noted: 2025-02-15

## 2025-02-15 LAB
25(OH)D3 SERPL-MCNC: 26.2 NG/ML (ref 30–100)
ANION GAP SERPL CALCULATED.3IONS-SCNC: 14 MMOL/L (ref 7–16)
BUN SERPL-MCNC: 39 MG/DL (ref 6–20)
CALCIUM SERPL-MCNC: 8.6 MG/DL (ref 8.6–10.2)
CHLORIDE SERPL-SCNC: 96 MMOL/L (ref 98–107)
CHOLEST SERPL-MCNC: 149 MG/DL
CO2 SERPL-SCNC: 28 MMOL/L (ref 22–29)
CREAT SERPL-MCNC: 9.7 MG/DL (ref 0.5–1)
ERYTHROCYTE [DISTWIDTH] IN BLOOD BY AUTOMATED COUNT: 15.2 % (ref 11.5–15)
GFR, ESTIMATED: 5 ML/MIN/1.73M2
GLUCOSE BLD-MCNC: 455 MG/DL (ref 74–99)
GLUCOSE SERPL-MCNC: 115 MG/DL (ref 74–99)
HCT VFR BLD AUTO: 22.1 % (ref 34–48)
HDLC SERPL-MCNC: 70 MG/DL
HGB BLD-MCNC: 7.3 G/DL (ref 11.5–15.5)
IRON SATN MFR SERPL: 8 % (ref 15–50)
IRON SERPL-MCNC: 19 UG/DL (ref 37–145)
LDLC SERPL CALC-MCNC: 68 MG/DL
MCH RBC QN AUTO: 31.2 PG (ref 26–35)
MCHC RBC AUTO-ENTMCNC: 33 G/DL (ref 32–34.5)
MCV RBC AUTO: 94.4 FL (ref 80–99.9)
PLATELET # BLD AUTO: 229 K/UL (ref 130–450)
PMV BLD AUTO: 9.7 FL (ref 7–12)
POTASSIUM SERPL-SCNC: 3.7 MMOL/L (ref 3.5–5)
RBC # BLD AUTO: 2.34 M/UL (ref 3.5–5.5)
SODIUM SERPL-SCNC: 138 MMOL/L (ref 132–146)
TIBC SERPL-MCNC: 224 UG/DL (ref 250–450)
TRIGL SERPL-MCNC: 56 MG/DL
VLDLC SERPL CALC-MCNC: 11 MG/DL
WBC OTHER # BLD: 9.8 K/UL (ref 4.5–11.5)

## 2025-02-15 PROCEDURE — 2500000003 HC RX 250 WO HCPCS

## 2025-02-15 PROCEDURE — 96376 TX/PRO/DX INJ SAME DRUG ADON: CPT

## 2025-02-15 PROCEDURE — G0378 HOSPITAL OBSERVATION PER HR: HCPCS

## 2025-02-15 PROCEDURE — 86850 RBC ANTIBODY SCREEN: CPT

## 2025-02-15 PROCEDURE — 6370000000 HC RX 637 (ALT 250 FOR IP): Performed by: NURSE PRACTITIONER

## 2025-02-15 PROCEDURE — 3600000003 HC SURGERY LEVEL 3 BASE: Performed by: ORTHOPAEDIC SURGERY

## 2025-02-15 PROCEDURE — 82962 GLUCOSE BLOOD TEST: CPT

## 2025-02-15 PROCEDURE — 6360000002 HC RX W HCPCS

## 2025-02-15 PROCEDURE — 2500000003 HC RX 250 WO HCPCS: Performed by: ANESTHESIOLOGY

## 2025-02-15 PROCEDURE — 2500000003 HC RX 250 WO HCPCS: Performed by: EMERGENCY MEDICINE

## 2025-02-15 PROCEDURE — 27818 TREATMENT OF ANKLE FRACTURE: CPT | Performed by: ORTHOPAEDIC SURGERY

## 2025-02-15 PROCEDURE — 83550 IRON BINDING TEST: CPT

## 2025-02-15 PROCEDURE — 86923 COMPATIBILITY TEST ELECTRIC: CPT

## 2025-02-15 PROCEDURE — 83540 ASSAY OF IRON: CPT

## 2025-02-15 PROCEDURE — 6370000000 HC RX 637 (ALT 250 FOR IP)

## 2025-02-15 PROCEDURE — 80048 BASIC METABOLIC PNL TOTAL CA: CPT

## 2025-02-15 PROCEDURE — 99223 1ST HOSP IP/OBS HIGH 75: CPT | Performed by: ORTHOPAEDIC SURGERY

## 2025-02-15 PROCEDURE — 7100000001 HC PACU RECOVERY - ADDTL 15 MIN: Performed by: ORTHOPAEDIC SURGERY

## 2025-02-15 PROCEDURE — C1713 ANCHOR/SCREW BN/BN,TIS/BN: HCPCS | Performed by: ORTHOPAEDIC SURGERY

## 2025-02-15 PROCEDURE — 2580000003 HC RX 258

## 2025-02-15 PROCEDURE — 2709999900 HC NON-CHARGEABLE SUPPLY: Performed by: ORTHOPAEDIC SURGERY

## 2025-02-15 PROCEDURE — 90935 HEMODIALYSIS ONE EVALUATION: CPT

## 2025-02-15 PROCEDURE — 73610 X-RAY EXAM OF ANKLE: CPT

## 2025-02-15 PROCEDURE — 2500000003 HC RX 250 WO HCPCS: Performed by: NURSE PRACTITIONER

## 2025-02-15 PROCEDURE — 86901 BLOOD TYPING SEROLOGIC RH(D): CPT

## 2025-02-15 PROCEDURE — 20692 APPL MLTPLN UNI EXT FIXJ SYS: CPT | Performed by: ORTHOPAEDIC SURGERY

## 2025-02-15 PROCEDURE — 82306 VITAMIN D 25 HYDROXY: CPT

## 2025-02-15 PROCEDURE — 86900 BLOOD TYPING SEROLOGIC ABO: CPT

## 2025-02-15 PROCEDURE — 7100000000 HC PACU RECOVERY - FIRST 15 MIN: Performed by: ORTHOPAEDIC SURGERY

## 2025-02-15 PROCEDURE — 85027 COMPLETE CBC AUTOMATED: CPT

## 2025-02-15 PROCEDURE — 80061 LIPID PANEL: CPT

## 2025-02-15 PROCEDURE — 0QSP35Z REPOSITION LEFT METATARSAL WITH EXTERNAL FIXATION DEVICE, PERCUTANEOUS APPROACH: ICD-10-PCS | Performed by: ORTHOPAEDIC SURGERY

## 2025-02-15 PROCEDURE — 2720000010 HC SURG SUPPLY STERILE: Performed by: ORTHOPAEDIC SURGERY

## 2025-02-15 PROCEDURE — 3700000001 HC ADD 15 MINUTES (ANESTHESIA): Performed by: ORTHOPAEDIC SURGERY

## 2025-02-15 PROCEDURE — 0QSH35Z REPOSITION LEFT TIBIA WITH EXTERNAL FIXATION DEVICE, PERCUTANEOUS APPROACH: ICD-10-PCS | Performed by: ORTHOPAEDIC SURGERY

## 2025-02-15 PROCEDURE — 99223 1ST HOSP IP/OBS HIGH 75: CPT | Performed by: NURSE PRACTITIONER

## 2025-02-15 PROCEDURE — 3600000013 HC SURGERY LEVEL 3 ADDTL 15MIN: Performed by: ORTHOPAEDIC SURGERY

## 2025-02-15 PROCEDURE — 3700000000 HC ANESTHESIA ATTENDED CARE: Performed by: ORTHOPAEDIC SURGERY

## 2025-02-15 PROCEDURE — 5A1D70Z PERFORMANCE OF URINARY FILTRATION, INTERMITTENT, LESS THAN 6 HOURS PER DAY: ICD-10-PCS | Performed by: INTERNAL MEDICINE

## 2025-02-15 RX ORDER — SODIUM CHLORIDE, SODIUM LACTATE, POTASSIUM CHLORIDE, CALCIUM CHLORIDE 600; 310; 30; 20 MG/100ML; MG/100ML; MG/100ML; MG/100ML
INJECTION, SOLUTION INTRAVENOUS
Status: DISCONTINUED | OUTPATIENT
Start: 2025-02-15 | End: 2025-02-15 | Stop reason: SDUPTHER

## 2025-02-15 RX ORDER — FENTANYL CITRATE 50 UG/ML
25 INJECTION, SOLUTION INTRAMUSCULAR; INTRAVENOUS EVERY 5 MIN PRN
Status: DISCONTINUED | OUTPATIENT
Start: 2025-02-15 | End: 2025-02-15 | Stop reason: HOSPADM

## 2025-02-15 RX ORDER — PHENYLEPHRINE HCL IN 0.9% NACL 1 MG/10 ML
SYRINGE (ML) INTRAVENOUS
Status: DISCONTINUED | OUTPATIENT
Start: 2025-02-15 | End: 2025-02-15 | Stop reason: SDUPTHER

## 2025-02-15 RX ORDER — HYDROMORPHONE HYDROCHLORIDE 1 MG/ML
1 INJECTION, SOLUTION INTRAMUSCULAR; INTRAVENOUS; SUBCUTANEOUS ONCE
Status: DISCONTINUED | OUTPATIENT
Start: 2025-02-15 | End: 2025-02-15

## 2025-02-15 RX ORDER — ONDANSETRON 2 MG/ML
4 INJECTION INTRAMUSCULAR; INTRAVENOUS
Status: DISCONTINUED | OUTPATIENT
Start: 2025-02-15 | End: 2025-02-15 | Stop reason: HOSPADM

## 2025-02-15 RX ORDER — INSULIN LISPRO 100 [IU]/ML
4 INJECTION, SOLUTION INTRAVENOUS; SUBCUTANEOUS ONCE
Status: COMPLETED | OUTPATIENT
Start: 2025-02-15 | End: 2025-02-15

## 2025-02-15 RX ORDER — ROCURONIUM BROMIDE 10 MG/ML
INJECTION, SOLUTION INTRAVENOUS
Status: DISCONTINUED | OUTPATIENT
Start: 2025-02-15 | End: 2025-02-15 | Stop reason: SDUPTHER

## 2025-02-15 RX ORDER — NALOXONE HYDROCHLORIDE 0.4 MG/ML
INJECTION, SOLUTION INTRAMUSCULAR; INTRAVENOUS; SUBCUTANEOUS PRN
Status: DISCONTINUED | OUTPATIENT
Start: 2025-02-15 | End: 2025-02-15 | Stop reason: HOSPADM

## 2025-02-15 RX ORDER — DEXTROSE MONOHYDRATE 100 MG/ML
INJECTION, SOLUTION INTRAVENOUS CONTINUOUS PRN
Status: DISCONTINUED | OUTPATIENT
Start: 2025-02-15 | End: 2025-02-22 | Stop reason: HOSPADM

## 2025-02-15 RX ORDER — MIDAZOLAM HYDROCHLORIDE 1 MG/ML
INJECTION, SOLUTION INTRAMUSCULAR; INTRAVENOUS
Status: DISCONTINUED | OUTPATIENT
Start: 2025-02-15 | End: 2025-02-15 | Stop reason: SDUPTHER

## 2025-02-15 RX ORDER — HYDROMORPHONE HYDROCHLORIDE 1 MG/ML
0.2 INJECTION, SOLUTION INTRAMUSCULAR; INTRAVENOUS; SUBCUTANEOUS EVERY 4 HOURS PRN
Status: DISCONTINUED | OUTPATIENT
Start: 2025-02-15 | End: 2025-02-22 | Stop reason: HOSPADM

## 2025-02-15 RX ORDER — CARVEDILOL 25 MG/1
25 TABLET ORAL EVERY MORNING
Status: DISCONTINUED | OUTPATIENT
Start: 2025-02-16 | End: 2025-02-22 | Stop reason: HOSPADM

## 2025-02-15 RX ORDER — LANOLIN ALCOHOL/MO/W.PET/CERES
500 CREAM (GRAM) TOPICAL DAILY
Status: DISCONTINUED | OUTPATIENT
Start: 2025-02-15 | End: 2025-02-22 | Stop reason: HOSPADM

## 2025-02-15 RX ORDER — SODIUM CHLORIDE 0.9 % (FLUSH) 0.9 %
5-40 SYRINGE (ML) INJECTION PRN
Status: DISCONTINUED | OUTPATIENT
Start: 2025-02-15 | End: 2025-02-15 | Stop reason: HOSPADM

## 2025-02-15 RX ORDER — MECOBALAMIN 5000 MCG
5 TABLET,DISINTEGRATING ORAL EVERY EVENING
Status: DISCONTINUED | OUTPATIENT
Start: 2025-02-15 | End: 2025-02-22 | Stop reason: HOSPADM

## 2025-02-15 RX ORDER — CALCIUM CARBONATE 500 MG/1
500 TABLET, CHEWABLE ORAL 3 TIMES DAILY PRN
Status: DISCONTINUED | OUTPATIENT
Start: 2025-02-15 | End: 2025-02-22 | Stop reason: HOSPADM

## 2025-02-15 RX ORDER — ACETAMINOPHEN 325 MG/1
650 TABLET ORAL EVERY 6 HOURS SCHEDULED
Status: DISCONTINUED | OUTPATIENT
Start: 2025-02-15 | End: 2025-02-22 | Stop reason: HOSPADM

## 2025-02-15 RX ORDER — HYDROMORPHONE HYDROCHLORIDE 1 MG/ML
0.5 INJECTION, SOLUTION INTRAMUSCULAR; INTRAVENOUS; SUBCUTANEOUS EVERY 5 MIN PRN
Status: DISCONTINUED | OUTPATIENT
Start: 2025-02-15 | End: 2025-02-15 | Stop reason: HOSPADM

## 2025-02-15 RX ORDER — FERROUS SULFATE 325(65) MG
325 TABLET ORAL EVERY OTHER DAY
Status: DISCONTINUED | OUTPATIENT
Start: 2025-02-15 | End: 2025-02-22 | Stop reason: HOSPADM

## 2025-02-15 RX ORDER — DEXTROSE MONOHYDRATE 100 MG/ML
INJECTION, SOLUTION INTRAVENOUS CONTINUOUS PRN
Status: DISCONTINUED | OUTPATIENT
Start: 2025-02-15 | End: 2025-02-15 | Stop reason: HOSPADM

## 2025-02-15 RX ORDER — SODIUM CHLORIDE 0.9 % (FLUSH) 0.9 %
5-40 SYRINGE (ML) INJECTION EVERY 12 HOURS SCHEDULED
Status: DISCONTINUED | OUTPATIENT
Start: 2025-02-15 | End: 2025-02-15 | Stop reason: HOSPADM

## 2025-02-15 RX ORDER — MECOBALAMIN 5000 MCG
5 TABLET,DISINTEGRATING ORAL NIGHTLY PRN
Status: DISCONTINUED | OUTPATIENT
Start: 2025-02-15 | End: 2025-02-22 | Stop reason: HOSPADM

## 2025-02-15 RX ORDER — ENOXAPARIN SODIUM 100 MG/ML
30 INJECTION SUBCUTANEOUS DAILY
Qty: 9 ML | Refills: 0 | Status: SHIPPED | OUTPATIENT
Start: 2025-02-15 | End: 2025-03-17

## 2025-02-15 RX ORDER — DIPHENHYDRAMINE HYDROCHLORIDE 50 MG/ML
12.5 INJECTION INTRAMUSCULAR; INTRAVENOUS
Status: DISCONTINUED | OUTPATIENT
Start: 2025-02-15 | End: 2025-02-15 | Stop reason: HOSPADM

## 2025-02-15 RX ORDER — PROPOFOL 10 MG/ML
INJECTION, EMULSION INTRAVENOUS
Status: DISCONTINUED | OUTPATIENT
Start: 2025-02-15 | End: 2025-02-15 | Stop reason: SDUPTHER

## 2025-02-15 RX ORDER — HYDRALAZINE HYDROCHLORIDE 20 MG/ML
10 INJECTION INTRAMUSCULAR; INTRAVENOUS EVERY 6 HOURS PRN
Status: DISCONTINUED | OUTPATIENT
Start: 2025-02-15 | End: 2025-02-22 | Stop reason: HOSPADM

## 2025-02-15 RX ORDER — ONDANSETRON 2 MG/ML
INJECTION INTRAMUSCULAR; INTRAVENOUS
Status: DISCONTINUED | OUTPATIENT
Start: 2025-02-15 | End: 2025-02-15 | Stop reason: SDUPTHER

## 2025-02-15 RX ORDER — INSULIN GLARGINE 100 [IU]/ML
6 INJECTION, SOLUTION SUBCUTANEOUS
Status: DISCONTINUED | OUTPATIENT
Start: 2025-02-15 | End: 2025-02-18

## 2025-02-15 RX ORDER — FENTANYL CITRATE 50 UG/ML
INJECTION, SOLUTION INTRAMUSCULAR; INTRAVENOUS
Status: DISCONTINUED | OUTPATIENT
Start: 2025-02-15 | End: 2025-02-15 | Stop reason: SDUPTHER

## 2025-02-15 RX ORDER — LIDOCAINE HYDROCHLORIDE AND EPINEPHRINE 10; 10 MG/ML; UG/ML
20 INJECTION, SOLUTION INFILTRATION; PERINEURAL ONCE
Status: DISCONTINUED | OUTPATIENT
Start: 2025-02-15 | End: 2025-02-22 | Stop reason: HOSPADM

## 2025-02-15 RX ORDER — SODIUM CHLORIDE 9 MG/ML
INJECTION, SOLUTION INTRAVENOUS PRN
Status: DISCONTINUED | OUTPATIENT
Start: 2025-02-15 | End: 2025-02-15 | Stop reason: HOSPADM

## 2025-02-15 RX ORDER — MULTIVITAMIN WITH IRON
1 TABLET ORAL DAILY
Status: DISCONTINUED | OUTPATIENT
Start: 2025-02-15 | End: 2025-02-22 | Stop reason: HOSPADM

## 2025-02-15 RX ORDER — PROCHLORPERAZINE EDISYLATE 5 MG/ML
5 INJECTION INTRAMUSCULAR; INTRAVENOUS
Status: DISCONTINUED | OUTPATIENT
Start: 2025-02-15 | End: 2025-02-15 | Stop reason: HOSPADM

## 2025-02-15 RX ORDER — SUCRALFATE 1 G/1
1 TABLET ORAL
Status: DISCONTINUED | OUTPATIENT
Start: 2025-02-15 | End: 2025-02-22 | Stop reason: HOSPADM

## 2025-02-15 RX ORDER — HYDROMORPHONE HYDROCHLORIDE 1 MG/ML
1 INJECTION, SOLUTION INTRAMUSCULAR; INTRAVENOUS; SUBCUTANEOUS
Status: DISCONTINUED | OUTPATIENT
Start: 2025-02-15 | End: 2025-02-15

## 2025-02-15 RX ORDER — CLOPIDOGREL BISULFATE 75 MG/1
75 TABLET ORAL DAILY
Status: DISCONTINUED | OUTPATIENT
Start: 2025-02-15 | End: 2025-02-19

## 2025-02-15 RX ORDER — PANTOPRAZOLE SODIUM 40 MG/1
40 TABLET, DELAYED RELEASE ORAL
Status: DISCONTINUED | OUTPATIENT
Start: 2025-02-15 | End: 2025-02-22 | Stop reason: HOSPADM

## 2025-02-15 RX ORDER — INSULIN LISPRO 100 [IU]/ML
0-4 INJECTION, SOLUTION INTRAVENOUS; SUBCUTANEOUS
Status: DISCONTINUED | OUTPATIENT
Start: 2025-02-15 | End: 2025-02-18

## 2025-02-15 RX ORDER — DEXAMETHASONE SODIUM PHOSPHATE 10 MG/ML
INJECTION, SOLUTION INTRAMUSCULAR; INTRAVENOUS
Status: DISCONTINUED | OUTPATIENT
Start: 2025-02-15 | End: 2025-02-15 | Stop reason: SDUPTHER

## 2025-02-15 RX ORDER — FLUTICASONE PROPIONATE 50 MCG
2 SPRAY, SUSPENSION (ML) NASAL DAILY
Status: DISCONTINUED | OUTPATIENT
Start: 2025-02-15 | End: 2025-02-22 | Stop reason: HOSPADM

## 2025-02-15 RX ORDER — OXYCODONE AND ACETAMINOPHEN 5; 325 MG/1; MG/1
1 TABLET ORAL EVERY 6 HOURS PRN
Qty: 28 TABLET | Refills: 0 | Status: SHIPPED | OUTPATIENT
Start: 2025-02-15 | End: 2025-02-22

## 2025-02-15 RX ORDER — SODIUM CHLORIDE 9 MG/ML
INJECTION, SOLUTION INTRAVENOUS
Status: DISCONTINUED | OUTPATIENT
Start: 2025-02-15 | End: 2025-02-15 | Stop reason: SDUPTHER

## 2025-02-15 RX ORDER — INSULIN LISPRO 100 [IU]/ML
6 INJECTION, SOLUTION INTRAVENOUS; SUBCUTANEOUS
Status: DISCONTINUED | OUTPATIENT
Start: 2025-02-15 | End: 2025-02-18

## 2025-02-15 RX ORDER — OXYCODONE HYDROCHLORIDE 5 MG/1
5 TABLET ORAL EVERY 4 HOURS PRN
Status: DISCONTINUED | OUTPATIENT
Start: 2025-02-15 | End: 2025-02-22 | Stop reason: HOSPADM

## 2025-02-15 RX ORDER — CLOPIDOGREL BISULFATE 75 MG/1
75 TABLET ORAL DAILY
Status: DISCONTINUED | OUTPATIENT
Start: 2025-02-16 | End: 2025-02-22 | Stop reason: HOSPADM

## 2025-02-15 RX ORDER — DOCUSATE SODIUM 100 MG/1
100 CAPSULE, LIQUID FILLED ORAL DAILY
Status: DISCONTINUED | OUTPATIENT
Start: 2025-02-15 | End: 2025-02-22 | Stop reason: HOSPADM

## 2025-02-15 RX ORDER — HYDROMORPHONE HYDROCHLORIDE 1 MG/ML
1 INJECTION, SOLUTION INTRAMUSCULAR; INTRAVENOUS; SUBCUTANEOUS ONCE
Status: COMPLETED | OUTPATIENT
Start: 2025-02-15 | End: 2025-02-15

## 2025-02-15 RX ORDER — FENTANYL CITRATE 50 UG/ML
INJECTION, SOLUTION INTRAMUSCULAR; INTRAVENOUS
Status: DISCONTINUED | OUTPATIENT
Start: 2025-02-15 | End: 2025-02-15

## 2025-02-15 RX ORDER — LIDOCAINE HYDROCHLORIDE 20 MG/ML
INJECTION, SOLUTION INTRAVENOUS
Status: DISCONTINUED | OUTPATIENT
Start: 2025-02-15 | End: 2025-02-15 | Stop reason: SDUPTHER

## 2025-02-15 RX ORDER — BENZONATATE 100 MG/1
100 CAPSULE ORAL 3 TIMES DAILY PRN
Status: DISCONTINUED | OUTPATIENT
Start: 2025-02-15 | End: 2025-02-22 | Stop reason: HOSPADM

## 2025-02-15 RX ORDER — CEFAZOLIN SODIUM 1 G/3ML
INJECTION, POWDER, FOR SOLUTION INTRAMUSCULAR; INTRAVENOUS
Status: DISCONTINUED | OUTPATIENT
Start: 2025-02-15 | End: 2025-02-15 | Stop reason: SDUPTHER

## 2025-02-15 RX ORDER — CALCIUM ACETATE 667 MG/1
4 CAPSULE ORAL
Status: DISCONTINUED | OUTPATIENT
Start: 2025-02-15 | End: 2025-02-22 | Stop reason: HOSPADM

## 2025-02-15 RX ORDER — IPRATROPIUM BROMIDE AND ALBUTEROL SULFATE 2.5; .5 MG/3ML; MG/3ML
1 SOLUTION RESPIRATORY (INHALATION)
Status: DISCONTINUED | OUTPATIENT
Start: 2025-02-15 | End: 2025-02-15 | Stop reason: HOSPADM

## 2025-02-15 RX ADMIN — FENTANYL CITRATE 50 MCG: 0.05 INJECTION, SOLUTION INTRAMUSCULAR; INTRAVENOUS at 10:12

## 2025-02-15 RX ADMIN — Medication 100 MCG: at 10:36

## 2025-02-15 RX ADMIN — INSULIN LISPRO 4 UNITS: 100 INJECTION, SOLUTION INTRAVENOUS; SUBCUTANEOUS at 21:07

## 2025-02-15 RX ADMIN — PANTOPRAZOLE SODIUM 40 MG: 40 TABLET, DELAYED RELEASE ORAL at 17:53

## 2025-02-15 RX ADMIN — Medication 5 MG: at 17:49

## 2025-02-15 RX ADMIN — ONDANSETRON 4 MG: 2 INJECTION, SOLUTION INTRAMUSCULAR; INTRAVENOUS at 10:31

## 2025-02-15 RX ADMIN — MULTIVITAMIN TABLET 1 TABLET: TABLET at 17:53

## 2025-02-15 RX ADMIN — Medication 100 MCG: at 10:27

## 2025-02-15 RX ADMIN — DEXAMETHASONE SODIUM PHOSPHATE 10 MG: 10 INJECTION INTRAMUSCULAR; INTRAVENOUS at 10:00

## 2025-02-15 RX ADMIN — CYANOCOBALAMIN TAB 1000 MCG 500 MCG: 1000 TAB at 17:53

## 2025-02-15 RX ADMIN — HYDROMORPHONE HYDROCHLORIDE 0.5 MG: 1 INJECTION, SOLUTION INTRAMUSCULAR; INTRAVENOUS; SUBCUTANEOUS at 11:26

## 2025-02-15 RX ADMIN — ACETAMINOPHEN 650 MG: 325 TABLET ORAL at 17:48

## 2025-02-15 RX ADMIN — OXYCODONE 5 MG: 5 TABLET ORAL at 21:07

## 2025-02-15 RX ADMIN — CALCIUM ACETATE 2668 MG: 667 CAPSULE ORAL at 17:49

## 2025-02-15 RX ADMIN — DOCUSATE SODIUM 100 MG: 100 CAPSULE, LIQUID FILLED ORAL at 17:53

## 2025-02-15 RX ADMIN — SUGAMMADEX 200 MG: 100 INJECTION, SOLUTION INTRAVENOUS at 10:37

## 2025-02-15 RX ADMIN — HYDROMORPHONE HYDROCHLORIDE 1 MG: 1 INJECTION, SOLUTION INTRAMUSCULAR; INTRAVENOUS; SUBCUTANEOUS at 04:02

## 2025-02-15 RX ADMIN — INSULIN LISPRO 6 UNITS: 100 INJECTION, SOLUTION INTRAVENOUS; SUBCUTANEOUS at 17:00

## 2025-02-15 RX ADMIN — FENTANYL CITRATE 75 MCG: 0.05 INJECTION, SOLUTION INTRAMUSCULAR; INTRAVENOUS at 09:46

## 2025-02-15 RX ADMIN — SODIUM CHLORIDE, POTASSIUM CHLORIDE, SODIUM LACTATE AND CALCIUM CHLORIDE: 600; 310; 30; 20 INJECTION, SOLUTION INTRAVENOUS at 09:36

## 2025-02-15 RX ADMIN — HYDROMORPHONE HYDROCHLORIDE 1 MG: 1 INJECTION, SOLUTION INTRAMUSCULAR; INTRAVENOUS; SUBCUTANEOUS at 07:44

## 2025-02-15 RX ADMIN — SODIUM CHLORIDE: 9 INJECTION, SOLUTION INTRAVENOUS at 09:39

## 2025-02-15 RX ADMIN — LIDOCAINE HYDROCHLORIDE 80 MG: 20 INJECTION, SOLUTION INTRAVENOUS at 09:46

## 2025-02-15 RX ADMIN — INSULIN LISPRO 4 UNITS: 100 INJECTION, SOLUTION INTRAVENOUS; SUBCUTANEOUS at 17:00

## 2025-02-15 RX ADMIN — INSULIN GLARGINE 6 UNITS: 100 INJECTION, SOLUTION SUBCUTANEOUS at 21:06

## 2025-02-15 RX ADMIN — INSULIN LISPRO 4 UNITS: 100 INJECTION, SOLUTION INTRAVENOUS; SUBCUTANEOUS at 21:06

## 2025-02-15 RX ADMIN — WATER 2000 MG: 1 INJECTION INTRAMUSCULAR; INTRAVENOUS; SUBCUTANEOUS at 17:49

## 2025-02-15 RX ADMIN — PROPOFOL 140 MG: 10 INJECTION, EMULSION INTRAVENOUS at 09:46

## 2025-02-15 RX ADMIN — ROCURONIUM BROMIDE 30 MG: 10 INJECTION, SOLUTION INTRAVENOUS at 09:46

## 2025-02-15 RX ADMIN — CEFAZOLIN 2 G: 1 INJECTION, POWDER, FOR SOLUTION INTRAMUSCULAR; INTRAVENOUS at 10:12

## 2025-02-15 RX ADMIN — FENTANYL CITRATE 75 MCG: 0.05 INJECTION, SOLUTION INTRAMUSCULAR; INTRAVENOUS at 09:40

## 2025-02-15 RX ADMIN — MIDAZOLAM 2 MG: 1 INJECTION INTRAMUSCULAR; INTRAVENOUS at 09:38

## 2025-02-15 RX ADMIN — SUCRALFATE 1 G: 1 TABLET ORAL at 17:49

## 2025-02-15 RX ADMIN — HYDROMORPHONE HYDROCHLORIDE 1 MG: 1 INJECTION, SOLUTION INTRAMUSCULAR; INTRAVENOUS; SUBCUTANEOUS at 01:05

## 2025-02-15 RX ADMIN — OXYCODONE 5 MG: 5 TABLET ORAL at 12:36

## 2025-02-15 RX ADMIN — Medication 200 MCG: at 10:22

## 2025-02-15 RX ADMIN — HYDROMORPHONE HYDROCHLORIDE 0.2 MG: 1 INJECTION, SOLUTION INTRAMUSCULAR; INTRAVENOUS; SUBCUTANEOUS at 17:46

## 2025-02-15 RX ADMIN — HYDROMORPHONE HYDROCHLORIDE 0.2 MG: 1 INJECTION, SOLUTION INTRAMUSCULAR; INTRAVENOUS; SUBCUTANEOUS at 23:03

## 2025-02-15 RX ADMIN — Medication 300 MCG: at 10:15

## 2025-02-15 RX ADMIN — FERROUS SULFATE TAB 325 MG (65 MG ELEMENTAL FE) 325 MG: 325 (65 FE) TAB at 17:53

## 2025-02-15 ASSESSMENT — PAIN DESCRIPTION - DESCRIPTORS
DESCRIPTORS: THROBBING;TENDER
DESCRIPTORS: ACHING
DESCRIPTORS: ACHING;DISCOMFORT
DESCRIPTORS: ACHING;SORE

## 2025-02-15 ASSESSMENT — PAIN SCALES - WONG BAKER
WONGBAKER_NUMERICALRESPONSE: HURTS EVEN MORE
WONGBAKER_NUMERICALRESPONSE: HURTS EVEN MORE

## 2025-02-15 ASSESSMENT — PAIN DESCRIPTION - LOCATION
LOCATION: ANKLE
LOCATION: LEG
LOCATION: ANKLE
LOCATION: LEG
LOCATION: ANKLE
LOCATION: FOOT;ANKLE
LOCATION: ANKLE

## 2025-02-15 ASSESSMENT — PAIN DESCRIPTION - ORIENTATION
ORIENTATION: LEFT;LOWER
ORIENTATION: LEFT
ORIENTATION: LEFT;LOWER
ORIENTATION: LEFT

## 2025-02-15 ASSESSMENT — ENCOUNTER SYMPTOMS: SHORTNESS OF BREATH: 1

## 2025-02-15 ASSESSMENT — PAIN SCALES - GENERAL
PAINLEVEL_OUTOF10: 4
PAINLEVEL_OUTOF10: 8
PAINLEVEL_OUTOF10: 6
PAINLEVEL_OUTOF10: 10
PAINLEVEL_OUTOF10: 10
PAINLEVEL_OUTOF10: 9
PAINLEVEL_OUTOF10: 7
PAINLEVEL_OUTOF10: 10
PAINLEVEL_OUTOF10: 10
PAINLEVEL_OUTOF10: 9
PAINLEVEL_OUTOF10: 7

## 2025-02-15 ASSESSMENT — PAIN - FUNCTIONAL ASSESSMENT
PAIN_FUNCTIONAL_ASSESSMENT: PREVENTS OR INTERFERES SOME ACTIVE ACTIVITIES AND ADLS
PAIN_FUNCTIONAL_ASSESSMENT: PREVENTS OR INTERFERES SOME ACTIVE ACTIVITIES AND ADLS
PAIN_FUNCTIONAL_ASSESSMENT: ADULT NONVERBAL PAIN SCALE (NPVS)

## 2025-02-15 ASSESSMENT — LIFESTYLE VARIABLES: SMOKING_STATUS: 1

## 2025-02-15 ASSESSMENT — PAIN DESCRIPTION - PAIN TYPE
TYPE: SURGICAL PAIN
TYPE: SURGICAL PAIN

## 2025-02-15 NOTE — ANESTHESIA PRE PROCEDURE
insulin.                 Abdominal:             Vascular: negative vascular ROS.         Other Findings:       Anesthesia Plan      general     ASA 3 - emergent       Induction: intravenous.    MIPS: Postoperative opioids intended and Prophylactic antiemetics administered.  Anesthetic plan and risks discussed with patient.      Plan discussed with CRNA.                Juan Jose Yin DO   2/15/2025

## 2025-02-15 NOTE — CONSULTS
[facility]  Nephrology Consult Note    Patient's Name: Michelle Nettles    Reason for Consult: ESRD on HD    Chief Complaint:  Fall  History Obtained From:  patient, past medical records, and EMR    History of Present Illness:    Michelle Nettles is a 39 y.o. female with a past medical history of ESRD, diabetes mellitus, and hypertension.  She presented to the [place] ED on 2/14 reportedly for concerns of syncope/fall.  Vital signs on 2/14 includes temperature 98.2, respirations 24, pulse 102, /73, and she was 99% SpO2.  Lab data on 2/14 includes potassium 3.1, CO2 31, BUN 28, creatinine 7.6, glucose 264, and hemoglobin 7.6.  She had a CT head on 2/14 which showed no evidence of an acute or subacute intracranial normality.  XR left ankle 2/14 showed acute trimalleolar fracture of the left ankle.  Chest x-ray from 2/14 showed no acute cardiopulmonary disease.  She reportedly transferred to [facility] on 2/15 and was seen by orthopedic surgery.  She underwent application of multiplanar external fixator LLE, closed treatment left ankle fracture/ dislocation 2/15.  Nephrology has been consulted to see the patient for ESRD on HD.  Patient is known to our service and dialyzes as an outpatient at [facility] via left arm AV fistula.  At present, patient was seen and examined.  She notes that she was standing up coughing and woke up on the floor.  She denies any dizziness.  She denies any chest pain.  She denies any nausea, vomiting, or diarrhea.  Her appetite has been good.    PMH:    Past Medical History:   Diagnosis Date    JOLLY (acute kidney injury) ([facility]) 4/5/2016    AVF (arteriovenous fistula) ([facility]) 02/19/2018    let arm    Chronic kidney disease     Dialysis AV fistula malfunction, initial encounter ([facility]) 11/7/2019    DM type 1 (diabetes mellitus, type 1) ([facility])     Encounter regarding vascular access for dialysis for ESRD ([facility]) 07/12/2018    ESRD (end stage renal  ESOPHAGOGASTRODUODENOSCOPY performed by Shira Parker MD at [facility]    UPPER GASTROINTESTINAL ENDOSCOPY N/A 7/18/2024    ESOPHAGOGASTRODUODENOSCOPY BIOPSY performed by Balaji Gonzalez MD at [facility]    UPPER GASTROINTESTINAL ENDOSCOPY N/A 1/13/2025    ESOPHAGOGASTRODUODENOSCOPY BIOPSY performed by Balaji Gonzalez MD at [facility]       Patient Active Problem List   Diagnosis    Poorly controlled type 1 diabetes mellitus (HCC)    Facial cellulitis    Acute hyperkalemia    Nasal polyp    Periorbital cellulitis of right eye    Periorbital cellulitis    Vitamin B deficiency, unspecified    Nasal congestion    Hidradenitis suppurativa    Symptomatic anemia    Normocytic anemia due to blood loss    Acute decompensated heart failure (HCC)    Gall stone    Dietary noncompliance    Metabolic encephalopathy    Hypoxia    Hypotension    History of venous thromboembolism    Chronic systolic (congestive) heart failure    MRSA colonization    ESRD on hemodialysis (HCC)    SOB (shortness of breath)    Chronic hypoxic respiratory failure, on home oxygen therapy    Acute on chronic anemia    Hyperglycemia due to diabetes mellitus ([facility])    Hypercalcemia    GI bleed    Hypoglycemia    Hypervolemia    Anemia    Acute superficial gastritis without hemorrhage    Schatzki's ring of distal esophagus    Closed trimalleolar fracture of left ankle       Diet:    Diet NPO    Meds:     lidocaine-EPINEPHrine  20 mL Other Once    sodium chloride flush  5-40 mL IntraVENous 2 times per day        sodium chloride      dextrose         Meds prn:     naloxone 0.4 mg in 10 mL sodium chloride syringe, sodium chloride flush, sodium chloride, fentanNYL, HYDROmorphone, diphenhydrAMINE, ondansetron, prochlorperazine, ipratropium 0.5 mg-albuterol 2.5 mg, glucose, dextrose bolus **OR** dextrose bolus, glucagon (rDNA), dextrose    Meds prior to admission:     No current facility-administered medications on file prior to

## 2025-02-15 NOTE — OP NOTE
Operative Note      Patient: Michelle Nettles  YOB: 1985  MRN: [id number]    Date of Procedure: 2/15/2025    Preoperative diagnosis:  1.  Left closed trimalleolar ankle fracture dislocation    2.  Renal failure on dialysis    3.  Type 1 diabetes    Post-Op Diagnosis: Same       Procedure:  1.  Application of multiplanar external fixator left lower extremity    2.  Closed treatment left trimalleolar ankle fracture/dislocation with manipulation    Surgeon(s):  Peyman Germain MD    Assistant:   Resident: Bill Henry DO; Conrad Markham DO    Anesthesia: General    Estimated Blood Loss (mL): Minimal    Complications: None    Specimens:   * No specimens in log *    Implants:  * No implants in log *      Drains: * No LDAs found *    Findings:  Infection Present At Time Of Surgery (PATOS) (choose all levels that have infection present):  No infection present  Other Findings: Overall much improved alignment with delta frame applied to the left ankle and manipulation    Detailed Description of Procedure:   Patient was brought to the operating room in a supine position on a hospital bed.  Patient was transferred to the operating room table by multiple individuals in a safe fashion with anesthesia in control of the patient's C-spine and airway.  Once on the operating table, all points of pressure were identified and well-padded.  A tourniquet was applied to the patient's left upper thigh although not used throughout the case.  The patient's left lower extremity was sterilely prepped and draped in the standard orthopedic fashion.  A timeout was performed indicating the appropriate identification of the patient, the procedure to be performed, and the side to be performed upon. This was agreed upon by all individuals in the room.  After the timeout, 2 5.0 mm external fixator pins were placed into the tibia just off the crest through small stab incisions with blunt dissection down to bone.

## 2025-02-15 NOTE — ED PROVIDER NOTES
[facility]  EMERGENCY DEPARTMENT ENCOUNTER        Pt Name: Michelle Nettles  MRN: [id number]  Birthdate 1985  Date of evaluation: 2/14/2025  Provider: Yves Moon MD  PCP: Rogers Rodríguez MD  Note Started: 5:59 AM EST 2/15/25    CHIEF COMPLAINT       Chief Complaint   Patient presents with    Fall     Pt states she was coughing then woke up on the floor. C/O left ankle and foot pain.     Loss of Consciousness    Ankle Pain       HISTORY OF PRESENT ILLNESS: 1 or more Elements   History From: Patient    Limitations to history : None    Michelle Nettles is a 39 y.o. female with past medical history of CHF, type 1 diabetes mellitus, anemia, dietary noncompliance, metabolic encephalopathy, end-stage renal disease on dialysis, chronic hypoxic respiratory failure who presents as a transfer from [place] ED with complaints of left trimalleolar fracture.  Patient states pain is returning at this time and is requesting pain medication.  Patient denies any other falls or trauma during transport.  Patient denies other complaints this time and states she is currently at her baseline oxygen and states she did miss dialysis yesterday secondary to being in the ED. patient states she had a syncopal episode 1 day prior to presenting to the ED at [place] yesterday in which she suffered injury to the left ankle.  Patient denies any dizziness or lightheadedness prior to incident or at this time.  Patient has any chest pain, shortness of breath, abdominal pain, nausea, vomiting diarrhea, numbness or tingling streams, dizzy lightheadedness, headache, blurry vision.  Patient does endorse tobacco use but denies any EtOH or illicit drug use.    Nursing Notes were all reviewed and agreed with or any disagreements were addressed in the HPI.    ROS:   Pertinent positives and negatives are stated within HPI, all other systems reviewed and are negative.    --------------------------------------------- PAST  nursing notes within the ED encounter and vital signs as below have been reviewed by myself.  BP (!) 175/87   Pulse (!) 108   Temp 98.2 °F (36.8 °C) (Temporal)   Resp 15   Wt 68 kg (150 lb)   SpO2 100%   BMI 30.30 kg/m²   Oxygen Saturation Interpretation: Normal    The patient’s available past medical records and past encounters were reviewed.      ------------------------------ ED COURSE/MEDICAL DECISION MAKING----------------------  Medications   lidocaine-EPINEPHrine 1 %-1:628399 injection 20 mL (has no administration in time range)   naloxone 0.4 mg in 10 mL sodium chloride syringe (has no administration in time range)   sodium chloride flush 0.9 % injection 5-40 mL (has no administration in time range)   sodium chloride flush 0.9 % injection 5-40 mL (has no administration in time range)   0.9 % sodium chloride infusion (has no administration in time range)   fentaNYL (SUBLIMAZE) injection 25 mcg (has no administration in time range)   HYDROmorphone HCl PF (DILAUDID) injection 0.5 mg (0.5 mg IntraVENous Given 2/15/25 1126)   diphenhydrAMINE (BENADRYL) injection 12.5 mg (has no administration in time range)   ondansetron (ZOFRAN) injection 4 mg (has no administration in time range)   prochlorperazine (COMPAZINE) injection 5 mg (has no administration in time range)   ipratropium 0.5 mg-albuterol 2.5 mg (DUONEB) nebulizer solution 1 Dose (has no administration in time range)   glucose chewable tablet 16 g (has no administration in time range)   dextrose bolus 10% 125 mL (has no administration in time range)     Or   dextrose bolus 10% 250 mL (has no administration in time range)   glucagon injection 1 mg (has no administration in time range)   dextrose 10 % infusion (has no administration in time range)   oxyCODONE (ROXICODONE) immediate release tablet 5 mg (5 mg Oral Given 2/15/25 1236)   oxyCODONE-acetaminophen (PERCOCET) 5-325 MG per tablet 1 tablet (1 tablet Oral Given 2/14/25 8211)   sodium chloride 0.9

## 2025-02-15 NOTE — ED NOTES
Report called to [facility] made aware PAS will be here to get patient soon for delayed transfer.

## 2025-02-15 NOTE — CONSULTS
Department of Orthopedic Surgery  Resident Consult Note          Reason for Consult: Left ankle pain    HISTORY OF PRESENT ILLNESS:       Patient is a 39 y.o. female who presents with left ankle pain after a fall that occurred yesterday.  Patient states that she passed out and fell and felt immediate pain in her left ankle with inability to bear weight afterwards.  Patient has numerous medical comorbidities including CKD on dialysis, type 1 diabetes, tobacco use, and is on 4-6 liters of oxygen at baseline.  She does not use a walker or any assistive devices at baseline.  No previous injuries to her left ankle.  Denies numbness/tingling/paresthesias.  Denies fevers or chills. Denies any other orthopedic complaints at this time.      Past Medical History:        Diagnosis Date    JOLLY (acute kidney injury) ([facility]) 2016    AVF (arteriovenous fistula) ([facility]) 2018    let arm    Chronic kidney disease     Dialysis AV fistula malfunction, initial encounter ([facility]) 2019    DM type 1 (diabetes mellitus, type 1) ([facility])     Encounter regarding vascular access for dialysis for ESRD ([facility]) 2018    ESRD (end stage renal disease) ([facility])     Hemodialysis patient ([facility])     amrita dawson  / graft in left arm    Hypertension     Tobacco abuse 2016     Past Surgical History:        Procedure Laterality Date     SECTION      COLONOSCOPY N/A 2024    COLONOSCOPY DIAGNOSTIC performed by Balaji Gonzalez MD at [facility]    CYST INCISION AND DRAINAGE  2011    perirectal abscess. [facility]. Dr. Fleming    DIALYSIS FISTULA CREATION Left 2019    LEFT ARM FISTULAGRAM, BALLOON ANGIOPLASTY performed by Haylee Zaidi MD at [facility] OR    DILATION AND CURETTAGE OF UTERUS      FRACTURE SURGERY      fracture right ulnar, left tibia, and pelvis    INVASIVE VASCULAR N/A 2023    Fistulogram left performed by Haylee Zaidi MD at [facility]     alcohol.  DRUGS:   reports that she does not currently use drugs after having used the following drugs: Marijuana (Weed).  Family History:       Problem Relation Age of Onset    Stroke Mother     Cancer Father     Diabetes Paternal Aunt        REVIEW OF SYSTEMS:  CONSTITUTIONAL:  negative for  fevers, chills  EYES:  negative for blurred vision, visual disturbance  HEENT:  negative for  hearing loss, voice change  RESPIRATORY:  negative for  dyspnea, wheezing.  On 4 L of oxygen  CARDIOVASCULAR:  negative for  chest pain, palpitations  GASTROINTESTINAL:  negative for nausea, vomiting  GENITOURINARY:  negative for frequency, urinary incontinence  HEMATOLOGIC/LYMPHATIC:  negative for bleeding and petechiae.  CKD  MUSCULOSKELETAL:  positive for  pain  NEUROLOGICAL:  negative for headaches, dizziness  BEHAVIOR/PSYCH:  negative for increased agitation and anxiety    PHYSICAL EXAM:    VITALS:  BP (!) 142/76   Pulse (!) 115   Temp 98.4 °F (36.9 °C) (Oral)   Resp 18   Wt 68 kg (150 lb)   SpO2 100%   BMI 30.30 kg/m²   CONSTITUTIONAL:  awake, alert, cooperative, no apparent distress, and appears stated age.  On 4 L of oxygen  EYES: Lids and lashes normal, pupils equal, round and reactive to light, extra ocular muscles intact, sclera clear, conjunctiva normal  ENT: Normocephalic, without obvious abnormality, atraumatic, external ears without lesions, oral pharynx with moist mucus membranes  NECK: Trachea midline, skin normal  LUNGS: No increased work of breathing, good air exchange  CARDIOVASCULAR: 2+ pulses throughout and capillary Refill less than 2 seconds  ABDOMEN: soft, non-distended, non-tender and no rebound tenderness or guarding  NEUROLOGIC: Awake, alert, oriented to name, place and time. Cranial nerves II-XII are grossly intact.   MUSCULOSKELETAL:  Left Lower Extremity:  Obvious deformity about the left ankle with significant swelling.  In splint [facility] by [facility]  Compartments of leg and foot soft and

## 2025-02-15 NOTE — ED PROVIDER NOTES
was transferred here for further evaluation.  She has a splint in place in her left ankle.  She has a history of end-stage renal disease on dialysis.  Last dialysis was Wednesday.  She was supposed to have dialysis yesterday but did not go secondary to being in the hospital [place].  She is also on chronic oxygen.  She denies fever or chills.  She denies chest pain or shortness of breath at this time.  X-rays [place] show a trimalleolar fracture left ankle.    Nursing Notes were all reviewed and agreed with or any disagreements were addressed in the HPI.      REVIEW OF EXTERNAL NOTE :       ED visit from [place] from yesterday, x-rays from [place] from yesterday,     internal medicine discharge summary from 1/15/2025      Hospital course in brief:  (Please refer to daily progress notes for a comprehensive review of the hospitalization by requesting medical records)     Ms. Michelle Nettles is a 39 y.o. year old lady with past medical history of end-stage renal disease on hemodialysis, chronic hypoxic respiratory failure on supplemental oxygen, chronic anemia, diabetes mellitus, hypertension.  She was sent in from HD center for evaluation of low hemoglobin.  She also reported shortness of breath on presentation.  She was admitted under hospitalist service with consult to general surgery nephrology for further management.  Respiratory status improved post hemodialysis, she is now on baseline oxygen.  She initially presented with a hemoglobin of 6.1, she received blood transfusion while here.  S/p EGD with general surgery-gastritis and Schatzki's ring, no evidence of bleed.  She has been started on PPI and Carafate.  No further drop in hemoglobin, hemoglobin is stable at 8.3 today.  No further procedures planned as per general surgery.  She was also seen by ENT for right otalgia, Flonase recommended, she will follow-up with Dr. Loya in 4 weeks.  Ultrasound of abdomen completed-no ascites.  She is  Smoking status: Some Days     Current packs/day: 0.50     Average packs/day: 0.5 packs/day for 25.7 years (12.8 ttl pk-yrs)     Types: Cigarettes     Start date: 6/19/1999     Passive exposure: Past    Smokeless tobacco: Never   Vaping Use    Vaping status: Never Used   Substance Use Topics    Alcohol use: Not Currently     Alcohol/week: 0.0 standard drinks of alcohol    Drug use: Not Currently     Types: Marijuana (Weed)       SCREENINGS   NIH Stroke Scale  NIH Stroke Scale Assessed: No    Dennard Coma Scale  Eye Opening: Spontaneous  Best Verbal Response: Oriented  Best Motor Response: Obeys commands  Jonny Coma Scale Score: 15                CIWA Assessment  BP: (!) 160/70  Pulse: (!) 106           PHYSICAL EXAM  1 or more Elements     ED Triage Vitals   BP Systolic BP Percentile Diastolic BP Percentile Temp Temp Source Pulse Respirations SpO2   02/14/25 1227 -- -- 02/14/25 1227 02/14/25 1227 02/14/25 1227 02/14/25 1227 02/14/25 1227   (!) 158/73   98.2 °F (36.8 °C) Oral (!) 102 24 99 %      Height Weight - Scale         -- 02/14/25 1252          68 kg (150 lb)                       Constitutional/General: Alert and oriented x3  Head: Normocephalic and atraumatic  Eyes: PERRL, EOMI, sclera non icteric  Mouth:  Oropharynx clear, handling secretions  Neck: Supple, full ROM, no stridor, no meningeal signs  Respiratory: Lungs with decreased BS bilaterally. Not in respiratory distress  Cardiovascular:  tachycardic but regular rhythm. No murmurs, no gallops, no rubs. 2+ distal pulses. Equal extremity pulses.   GI:  Abdomen Soft, Non tender, Non distended.  No rebound, guarding, or rigidity. No pulsatile masses.  Musculoskeletal: Moves all extremities x 4. Warm and well perfused, no clubbing, no cyanosis, no edema. Capillary refill <3 seconds. Compartments soft and compressible.  No calf tenderness or palpable cords.    Left LE: Short leg splint in place.  Tenderness to palpation along both medial and lateral

## 2025-02-15 NOTE — H&P
Hospitalist History & Physical      PCP: Rogers Rodríguez MD    Date of Service: Pt seen/examined on 2/15/2025     Chief Complaint:  had concerns including Fall (Pt states she was coughing then woke up on the floor. C/O left ankle and foot pain. ), Loss of Consciousness, and Ankle Pain.    History of Present Illness:    Ms. Michelle Nettles, a 39 y.o. year old female  who  has a past medical history of JOLLY (acute kidney injury) ([facility]), AVF (arteriovenous fistula) ([facility]), Chronic kidney disease, Dialysis AV fistula malfunction, initial encounter ([facility]), DM type 1 (diabetes mellitus, type 1) ([facility]), Encounter regarding vascular access for dialysis for ESRD ([facility]), ESRD (end stage renal disease) ([facility]), Hemodialysis patient ([facility]), Hypertension, and Tobacco abuse.     Patient presented to [place] ED on 2/14 with complaints of the left ankle pain after a syncopal episode the day prior to arrival.  She reports she was coughing and then woke up on the floor.  She was able to get up and go about her day without problems aside from persistent left ankle pain.  Of note she did miss her dialysis session yesterday.      In ED she was tachycardic, vitals otherwise stable on baseline oxygen requirements. Laboratory workup consistent with ESRD also significant for hypokalemia, elevated but adynamic troponin, normocytic anemia.  Imaging significant for acute displaced trimalleolar fracture of the left ankle.  I decision was made to transfer here for admission and orthopedic evaluation.    She underwent ex fix application today. On my exam, she is lethargic s/p anesthesia and a difficult historian but denies any acute complaints. Specially she denies any chest pain, palpitations, dizziness, lightheadedness, SOB, fevers, chills, malaise.      PREVIOUS HOSPITALIZATION:  Admission from 1/10 - 1/15 reviewed.  Patient sent in from HD for anemia.  General surgery was consulted and she underwent an EGD which revealed gastritis and       Folate and B12:   Lab Results   Component Value Date    JRVNKVNM49 1852 (H) 03/23/2024   ,   Lab Results   Component Value Date    FOLATE 12.3 03/23/2024     Lactic Acid:   Lab Results   Component Value Date    LACTA 1.1 07/17/2024     Thyroid Studies:   Lab Results   Component Value Date    TSH 3.66 01/11/2025       Oupatient labs:  Lab Results   Component Value Date    CHOL 155 10/08/2013    TRIG 68 10/08/2013    HDL 40 01/29/2021    TSH 3.66 01/11/2025    INR 1.0 01/30/2024    LABA1C 6.4 01/28/2025       Urinalysis:    Lab Results   Component Value Date/Time    NITRU Negative 11/23/2017 10:05 PM    WBCUA 2-5 11/23/2017 10:05 PM    WBCUA NONE 11/21/2010 10:30 PM    BACTERIA RARE 11/23/2017 10:05 PM    RBCUA 5-10 11/23/2017 10:05 PM    RBCUA 0-1 09/29/2013 02:15 PM    BLOODU MODERATE 11/23/2017 10:05 PM    GLUCOSEU 500 11/23/2017 10:05 PM    GLUCOSEU >=1000 11/21/2010 10:30 PM       Imaging:  XR ANKLE LEFT (MIN 3 VIEWS)    Result Date: 2/15/2025  No significant change in the displaced fractures of the distal fibula and medial malleolus with disruption of the ankle mortise. Overlying cast obscures some bony detail.     CT CERVICAL SPINE WO CONTRAST    Result Date: 2/14/2025  No acute abnormality of the cervical spine.     XR CHEST PORTABLE    Result Date: 2/14/2025  No acute cardiopulmonary disease.     XR ANKLE LEFT (MIN 3 VIEWS)    Result Date: 2/14/2025  1. Acute trimalleolar fracture of the left ankle with minimal lateral subluxation of the talus in relation to the tibia.     CT HEAD WO CONTRAST    Result Date: 2/14/2025  1. No evidence of an acute or subacute intracranial abnormality. 2. Right-sided mastoiditis.       Assessment and Plan:    Principal Problem:    Closed trimalleolar fracture of left ankle  Active Problems:    ESRD needing dialysis (HCC)    Syncope and collapse  Resolved Problems:    * No resolved hospital problems. *    Discussed patient's case with ED physician.  Admit to intermediate

## 2025-02-15 NOTE — ANESTHESIA POSTPROCEDURE EVALUATION
Department of Anesthesiology  Postprocedure Note    Patient: Michelle Nettles  MRN: [id number]  YOB: 1985  Date of evaluation: 2/15/2025    Procedure Summary       Date: 02/15/25 Room / Location: [address]    Anesthesia Start: 0939 Anesthesia Stop: 1058    Procedure: Application of left ankle spanning external fixator (Left: Ankle) Diagnosis: Ankle fracture, bimalleolar, closed, left, initial encounter    Surgeons: Peyman Germain MD Responsible Provider: Juan Jose Yin DO    Anesthesia Type: general ASA Status: 3 - Emergent            Anesthesia Type: No value filed.    David Phase I: David Score: 4    David Phase II:      Anesthesia Post Evaluation    Patient location during evaluation: PACU  Patient participation: complete - patient participated  Level of consciousness: awake  Airway patency: patent  Nausea & Vomiting: no nausea and no vomiting  Cardiovascular status: hemodynamically stable  Respiratory status: acceptable  Hydration status: stable  Pain management: adequate    No notable events documented.

## 2025-02-15 NOTE — DISCHARGE INSTRUCTIONS
LOS: 0 days   Patient Care Team:  Elyse Johnson MD as PCP - General (Family Medicine)      Subjective     Interval History:     Subjective: Personal history of colon polyps    Colonoscopy 2013 tubular adenoma  Colonoscopy 2014 benign with negative random colon biopsies    ROS:   No chest pain, shortness of breath, or cough.   No change since scanned H&P       Medication Review:   No current facility-administered medications for this encounter.   No current outpatient medications on file.      Objective     Vital Signs  There were no vitals filed for this visit.    Physical Exam:    General Appearance:    Awake and alert, in no acute distress   Head:    Normocephalic, without obvious abnormality   Eyes:          Conjunctivae normal, anicteric sclera   Throat:   No oral lesions, no thrush, oral mucosa moist   Neck:   No adenopathy, supple, no JVD   Lungs:     respirations regular, even and unlabored   Abdomen:     Soft, non-tender, no rebound or guarding, non-distended, no hepatosplenomegaly   Rectal:     Deferred   Extremities:   No edema, no cyanosis   Skin:   No bruising or rash, no jaundice        Results Review:    Lab Results (last 24 hours)     ** No results found for the last 24 hours. **          Imaging Results (last 24 hours)     ** No results found for the last 24 hours. **            Assessment/Plan   Proceed with scope and MAC anesthesia        Haile Madison MD  10/02/19  10:12 AM   [facility] Department of Orthopedic Surgery  [address] [facility] [address]    Dr. Jorge Moore, DO         MD Dr. Jorge Mensah MD Frank Ansevin, PA-C Sara Zatchok, PA-C Tyler Tsangaris PA-C      Orthopaedics Discharge Instructions   Weight bearing Status - Non-weight bearing - on left lower Extremity  Pain Medication   Contact Office for Medication Refill- [phone]  Office can refill pain medications no sooner than every 7 days  If patient discharging to facility then pain control will be continued per facility physician  Ice to operative/injured site for 15-30 minutes of each hour for next 5 days    Recommend that you continue to ice the area 2-3 times per day after this   Elevate operative/injured limb on 2 pillows at home  Goal is to have limb above the heart if able  Continue DVT Prophylaxis (blood clot prevention) as prescribed: plavix as normally prescribed, lovenox 30mg daily      Follow up in office in approximately 2 weeks. Your first follow up appointment is often with one of our physician assistants.     Call the office at [phone] if having any concerns.     Watch for these significant complications.  Call physician if they or any other problems occur:  Fever over 101°, redness, swelling or warmth at the operative site  Unrelieved nausea    Foul smelling or cloudy drainage at the operative site   Unrelieved pain    Blood soaked dressing. (Some oozing may be normal)     Numb, pale, blue, cold or tingling extremity          It is the Department of Orthopaedic Trauma's standard of practice that providers will de-escalate (wean) all patients from narcotic (opioid) medications during the post-operative period.   We provide multimodal pain control, but opioid medications are tapered in all of our patients.  If patient requires referral to pain management for prolonged taper from opioid pain medication, we will facilitate this process.        Weight bearing is

## 2025-02-15 NOTE — FLOWSHEET NOTE
02/15/25 1725   Vital Signs   BP (!) 147/73   Temp 97.9 °F (36.6 °C)   Pulse (!) 120   Respirations 20   Weight - Scale   (cart)   Pain Assessment   Pain Assessment 0-10   Pain Level 10   Pain Type Surgical pain   Pain Location Ankle   Post-Hemodialysis Assessment   Post-Treatment Procedures Blood returned;Access bleeding time < 10 minutes   Machine Disinfection Process Exterior Machine Disinfection;Heat Disinfect;Acid/Vinegar Clean   Rinseback Volume (ml) 300 ml   Blood Volume Processed (Liters) 87 L   Dialyzer Clearance Lightly streaked   Duration of Treatment (minutes) 240 minutes   Heparin Amount Administered During Treatment (mL) 0 mL   Hemodialysis Intake (ml) 300 ml   Hemodialysis Output (ml) 3300 ml   NET Removed (ml) 3000   Tolerated Treatment Good   Patient Response to Treatment tolerated well   Bilateral Breath Sounds Diminished   Physician Notified No   Time Off 1715   Patient Disposition Return to room   Observations & Evaluations   Level of Consciousness 0   Oriented X 3

## 2025-02-16 ENCOUNTER — APPOINTMENT (OUTPATIENT)
Dept: CT IMAGING | Age: 40
DRG: 492 | End: 2025-02-16
Payer: MEDICARE

## 2025-02-16 PROBLEM — S82.852A TRIMALLEOLAR FRACTURE OF ANKLE, CLOSED, LEFT, INITIAL ENCOUNTER: Status: ACTIVE | Noted: 2025-02-16

## 2025-02-16 LAB
ALBUMIN SERPL-MCNC: 3.7 G/DL (ref 3.5–5.2)
ALP SERPL-CCNC: 128 U/L (ref 35–104)
ALT SERPL-CCNC: 6 U/L (ref 0–32)
ANION GAP SERPL CALCULATED.3IONS-SCNC: 13 MMOL/L (ref 7–16)
AST SERPL-CCNC: 17 U/L (ref 0–31)
BILIRUB SERPL-MCNC: 0.2 MG/DL (ref 0–1.2)
BUN SERPL-MCNC: 29 MG/DL (ref 6–20)
CALCIUM SERPL-MCNC: 9 MG/DL (ref 8.6–10.2)
CHLORIDE SERPL-SCNC: 95 MMOL/L (ref 98–107)
CO2 SERPL-SCNC: 31 MMOL/L (ref 22–29)
CREAT SERPL-MCNC: 5.8 MG/DL (ref 0.5–1)
ERYTHROCYTE [DISTWIDTH] IN BLOOD BY AUTOMATED COUNT: 15.1 % (ref 11.5–15)
GFR, ESTIMATED: 9 ML/MIN/1.73M2
GLUCOSE BLD-MCNC: 218 MG/DL (ref 74–99)
GLUCOSE BLD-MCNC: 239 MG/DL (ref 74–99)
GLUCOSE SERPL-MCNC: 160 MG/DL (ref 74–99)
HCT VFR BLD AUTO: 23.2 % (ref 34–48)
HGB BLD-MCNC: 7.6 G/DL (ref 11.5–15.5)
MAGNESIUM SERPL-MCNC: 2.3 MG/DL (ref 1.6–2.6)
MCH RBC QN AUTO: 31.4 PG (ref 26–35)
MCHC RBC AUTO-ENTMCNC: 32.8 G/DL (ref 32–34.5)
MCV RBC AUTO: 95.9 FL (ref 80–99.9)
PHOSPHATE SERPL-MCNC: 3.3 MG/DL (ref 2.5–4.5)
PLATELET # BLD AUTO: 247 K/UL (ref 130–450)
PMV BLD AUTO: 10 FL (ref 7–12)
POTASSIUM SERPL-SCNC: 3.5 MMOL/L (ref 3.5–5)
PROT SERPL-MCNC: 7.8 G/DL (ref 6.4–8.3)
RBC # BLD AUTO: 2.42 M/UL (ref 3.5–5.5)
SODIUM SERPL-SCNC: 139 MMOL/L (ref 132–146)
WBC OTHER # BLD: 9.2 K/UL (ref 4.5–11.5)

## 2025-02-16 PROCEDURE — 36415 COLL VENOUS BLD VENIPUNCTURE: CPT

## 2025-02-16 PROCEDURE — 73700 CT LOWER EXTREMITY W/O DYE: CPT

## 2025-02-16 PROCEDURE — 6360000002 HC RX W HCPCS

## 2025-02-16 PROCEDURE — 84100 ASSAY OF PHOSPHORUS: CPT

## 2025-02-16 PROCEDURE — 2500000003 HC RX 250 WO HCPCS

## 2025-02-16 PROCEDURE — 82962 GLUCOSE BLOOD TEST: CPT

## 2025-02-16 PROCEDURE — 1200000000 HC SEMI PRIVATE

## 2025-02-16 PROCEDURE — 83735 ASSAY OF MAGNESIUM: CPT

## 2025-02-16 PROCEDURE — 6370000000 HC RX 637 (ALT 250 FOR IP): Performed by: NURSE PRACTITIONER

## 2025-02-16 PROCEDURE — 6370000000 HC RX 637 (ALT 250 FOR IP)

## 2025-02-16 PROCEDURE — 80053 COMPREHEN METABOLIC PANEL: CPT

## 2025-02-16 PROCEDURE — 76376 3D RENDER W/INTRP POSTPROCES: CPT

## 2025-02-16 PROCEDURE — 6370000000 HC RX 637 (ALT 250 FOR IP): Performed by: STUDENT IN AN ORGANIZED HEALTH CARE EDUCATION/TRAINING PROGRAM

## 2025-02-16 PROCEDURE — 85027 COMPLETE CBC AUTOMATED: CPT

## 2025-02-16 PROCEDURE — 2500000003 HC RX 250 WO HCPCS: Performed by: NURSE PRACTITIONER

## 2025-02-16 PROCEDURE — 96376 TX/PRO/DX INJ SAME DRUG ADON: CPT

## 2025-02-16 PROCEDURE — 99232 SBSQ HOSP IP/OBS MODERATE 35: CPT | Performed by: STUDENT IN AN ORGANIZED HEALTH CARE EDUCATION/TRAINING PROGRAM

## 2025-02-16 RX ORDER — INSULIN LISPRO 100 [IU]/ML
4 INJECTION, SOLUTION INTRAVENOUS; SUBCUTANEOUS ONCE
Status: COMPLETED | OUTPATIENT
Start: 2025-02-16 | End: 2025-02-16

## 2025-02-16 RX ORDER — POTASSIUM CHLORIDE 1500 MG/1
40 TABLET, EXTENDED RELEASE ORAL ONCE
Status: COMPLETED | OUTPATIENT
Start: 2025-02-16 | End: 2025-02-16

## 2025-02-16 RX ADMIN — WATER 2000 MG: 1 INJECTION INTRAMUSCULAR; INTRAVENOUS; SUBCUTANEOUS at 01:01

## 2025-02-16 RX ADMIN — SUCRALFATE 1 G: 1 TABLET ORAL at 05:11

## 2025-02-16 RX ADMIN — OXYCODONE 5 MG: 5 TABLET ORAL at 13:21

## 2025-02-16 RX ADMIN — INSULIN LISPRO 1 UNITS: 100 INJECTION, SOLUTION INTRAVENOUS; SUBCUTANEOUS at 08:57

## 2025-02-16 RX ADMIN — OXYCODONE 5 MG: 5 TABLET ORAL at 21:24

## 2025-02-16 RX ADMIN — HYDROMORPHONE HYDROCHLORIDE 0.2 MG: 1 INJECTION, SOLUTION INTRAMUSCULAR; INTRAVENOUS; SUBCUTANEOUS at 03:07

## 2025-02-16 RX ADMIN — INSULIN LISPRO 6 UNITS: 100 INJECTION, SOLUTION INTRAVENOUS; SUBCUTANEOUS at 10:59

## 2025-02-16 RX ADMIN — CARVEDILOL 25 MG: 25 TABLET, FILM COATED ORAL at 08:56

## 2025-02-16 RX ADMIN — OXYCODONE 5 MG: 5 TABLET ORAL at 17:23

## 2025-02-16 RX ADMIN — OXYCODONE 5 MG: 5 TABLET ORAL at 01:01

## 2025-02-16 RX ADMIN — OXYCODONE 5 MG: 5 TABLET ORAL at 05:11

## 2025-02-16 RX ADMIN — INSULIN GLARGINE 6 UNITS: 100 INJECTION, SOLUTION SUBCUTANEOUS at 20:03

## 2025-02-16 RX ADMIN — INSULIN LISPRO 6 UNITS: 100 INJECTION, SOLUTION INTRAVENOUS; SUBCUTANEOUS at 08:56

## 2025-02-16 RX ADMIN — SUCRALFATE 1 G: 1 TABLET ORAL at 17:23

## 2025-02-16 RX ADMIN — HYDROMORPHONE HYDROCHLORIDE 0.2 MG: 1 INJECTION, SOLUTION INTRAMUSCULAR; INTRAVENOUS; SUBCUTANEOUS at 10:59

## 2025-02-16 RX ADMIN — Medication 5 MG: at 17:23

## 2025-02-16 RX ADMIN — INSULIN LISPRO 4 UNITS: 100 INJECTION, SOLUTION INTRAVENOUS; SUBCUTANEOUS at 20:03

## 2025-02-16 RX ADMIN — ACETAMINOPHEN 650 MG: 325 TABLET ORAL at 23:24

## 2025-02-16 RX ADMIN — MULTIVITAMIN TABLET 1 TABLET: TABLET at 08:55

## 2025-02-16 RX ADMIN — HYDROMORPHONE HYDROCHLORIDE 0.2 MG: 1 INJECTION, SOLUTION INTRAMUSCULAR; INTRAVENOUS; SUBCUTANEOUS at 15:12

## 2025-02-16 RX ADMIN — POTASSIUM CHLORIDE 40 MEQ: 1500 TABLET, EXTENDED RELEASE ORAL at 08:56

## 2025-02-16 RX ADMIN — HYDROMORPHONE HYDROCHLORIDE 0.2 MG: 1 INJECTION, SOLUTION INTRAMUSCULAR; INTRAVENOUS; SUBCUTANEOUS at 19:16

## 2025-02-16 RX ADMIN — PANTOPRAZOLE SODIUM 40 MG: 40 TABLET, DELAYED RELEASE ORAL at 17:23

## 2025-02-16 RX ADMIN — ACETAMINOPHEN 650 MG: 325 TABLET ORAL at 10:59

## 2025-02-16 RX ADMIN — INSULIN LISPRO 4 UNITS: 100 INJECTION, SOLUTION INTRAVENOUS; SUBCUTANEOUS at 20:04

## 2025-02-16 RX ADMIN — CLOPIDOGREL BISULFATE 75 MG: 75 TABLET ORAL at 08:56

## 2025-02-16 RX ADMIN — ACETAMINOPHEN 650 MG: 325 TABLET ORAL at 17:23

## 2025-02-16 RX ADMIN — CALCIUM ACETATE 2668 MG: 667 CAPSULE ORAL at 08:55

## 2025-02-16 RX ADMIN — CALCIUM ACETATE 2668 MG: 667 CAPSULE ORAL at 10:59

## 2025-02-16 RX ADMIN — HYDROMORPHONE HYDROCHLORIDE 0.2 MG: 1 INJECTION, SOLUTION INTRAMUSCULAR; INTRAVENOUS; SUBCUTANEOUS at 23:14

## 2025-02-16 RX ADMIN — HYDROMORPHONE HYDROCHLORIDE 0.2 MG: 1 INJECTION, SOLUTION INTRAMUSCULAR; INTRAVENOUS; SUBCUTANEOUS at 06:48

## 2025-02-16 RX ADMIN — DOCUSATE SODIUM 100 MG: 100 CAPSULE, LIQUID FILLED ORAL at 08:56

## 2025-02-16 RX ADMIN — CYANOCOBALAMIN TAB 1000 MCG 500 MCG: 1000 TAB at 08:56

## 2025-02-16 RX ADMIN — PANTOPRAZOLE SODIUM 40 MG: 40 TABLET, DELAYED RELEASE ORAL at 05:11

## 2025-02-16 RX ADMIN — CALCIUM ACETATE 2668 MG: 667 CAPSULE ORAL at 17:23

## 2025-02-16 RX ADMIN — INSULIN LISPRO 1 UNITS: 100 INJECTION, SOLUTION INTRAVENOUS; SUBCUTANEOUS at 10:59

## 2025-02-16 ASSESSMENT — PAIN DESCRIPTION - DESCRIPTORS
DESCRIPTORS: ACHING;DISCOMFORT;GNAWING
DESCRIPTORS: ACHING;CRAMPING;DISCOMFORT
DESCRIPTORS: ACHING;DISCOMFORT;GNAWING
DESCRIPTORS: ACHING;THROBBING;SORE;SHARP
DESCRIPTORS: ACHING;DISCOMFORT;SORE;TENDER
DESCRIPTORS: ACHING;DISCOMFORT

## 2025-02-16 ASSESSMENT — PAIN - FUNCTIONAL ASSESSMENT
PAIN_FUNCTIONAL_ASSESSMENT: PREVENTS OR INTERFERES SOME ACTIVE ACTIVITIES AND ADLS

## 2025-02-16 ASSESSMENT — PAIN DESCRIPTION - LOCATION
LOCATION: ANKLE

## 2025-02-16 ASSESSMENT — PAIN SCALES - WONG BAKER
WONGBAKER_NUMERICALRESPONSE: HURTS EVEN MORE
WONGBAKER_NUMERICALRESPONSE: 4;6
WONGBAKER_NUMERICALRESPONSE: 4;6

## 2025-02-16 ASSESSMENT — PAIN DESCRIPTION - ORIENTATION
ORIENTATION: LEFT

## 2025-02-16 ASSESSMENT — PAIN SCALES - GENERAL
PAINLEVEL_OUTOF10: 7
PAINLEVEL_OUTOF10: 6
PAINLEVEL_OUTOF10: 7
PAINLEVEL_OUTOF10: 5
PAINLEVEL_OUTOF10: 8
PAINLEVEL_OUTOF10: 9
PAINLEVEL_OUTOF10: 7
PAINLEVEL_OUTOF10: 5
PAINLEVEL_OUTOF10: 6
PAINLEVEL_OUTOF10: 8
PAINLEVEL_OUTOF10: 6
PAINLEVEL_OUTOF10: 5
PAINLEVEL_OUTOF10: 8

## 2025-02-16 NOTE — PROGRESS NOTES
Department of Orthopedic Surgery  Resident Progress Note    Patient seen and examined at bedside.  Patient resting comfortably upon entering room.  Patient easily awakened and oriented x 3.  Patient stated that her pain overnight was very severe but since adjustments have been made to her pain medication her pain is calm down and is minimal at this time.  Denies any increasing numbness tingling paresthesias travel down lower extremity left foot.  Denies any chest pain shortness of breath fevers chills nausea vomiting.  No new complaints      VITALS:  /77   Pulse (!) 107   Temp 97.2 °F (36.2 °C) (Temporal)   Resp 16   Wt 61.2 kg (134 lb 14.4 oz)   SpO2 100%   BMI 27.25 kg/m²     General: Awake alert oriented x 3    MUSCULOSKELETAL:   left lower extremity:  Dressing C/D/I external fixator in place  Compartments soft and compressible  Able to actively flex and extend at the toes DF and PF limited due to external fixator in place  +2/4 DP & PT pulses, Brisk Cap refill, Toes warm and perfused  Distal sensation grossly intact to Peroneals, Sural, Saphenous, and tibial nrs    CBC:   Lab Results   Component Value Date/Time    WBC 9.2 02/16/2025 04:20 AM    HGB 7.6 02/16/2025 04:20 AM    HCT 23.2 02/16/2025 04:20 AM     02/16/2025 04:20 AM     PT/INR:    Lab Results   Component Value Date/Time    PROTIME 11.4 01/30/2024 04:20 PM    INR 1.0 01/30/2024 04:20 PM       ASSESSMENT  S/P left ankle external fixator placement on 2/15/2025    PLAN    Plan discussed patient all patient's questions answered to their satisfaction  Nonweightbearing left lower extremity  PT/OT as able  Medical management appreciated  Pain control p.o. and IV  Okay to start DVT prophylaxis today  Continue 24 hours postop antibiotics  Plan: Will continue to follow on patient  D/W attending  Electronically signed by Conrad Markham DO on 2/16/2025 at 7:21 AM

## 2025-02-16 NOTE — PROGRESS NOTES
OT SESSION ATTEMPT     Date:2025  Patient Name: Michelle Nettles  MRN: [id number]  : 1985  Room: [address] [place] [address]     Attempted OT session this date- pt off unit at time of attempt.    Will reattempt OT session at a later time.    Betsy Alvarez OTD, OTR/L, [id number]

## 2025-02-16 NOTE — PROGRESS NOTES
Physical Therapy    PT order received and medical chart reviewed on 2/16/25. PT evaluation was attempted but pt was off unit at time of attempt. Will re-attempt as able. Thank you.    Mandy Quevedo, PT, DPT  [id number]

## 2025-02-16 NOTE — PROGRESS NOTES
[facility]  Nephrology Progress Note    Patient's Name: Michelle Nettles    History of Present Illness from 2/15 consult note:    \"Michelle Nettles is a 39 y.o. female with a past medical history of ESRD, diabetes mellitus, and hypertension.  She presented to the [place] ED on 2/14 reportedly for concerns of syncope/fall.  Vital signs on 2/14 includes temperature 98.2, respirations 24, pulse 102, /73, and she was 99% SpO2.  Lab data on 2/14 includes potassium 3.1, CO2 31, BUN 28, creatinine 7.6, glucose 264, and hemoglobin 7.6.  She had a CT head on 2/14 which showed no evidence of an acute or subacute intracranial normality.  XR left ankle 2/14 showed acute trimalleolar fracture of the left ankle.  Chest x-ray from 2/14 showed no acute cardiopulmonary disease.  She reportedly transferred to [facility] on 2/15 and was seen by orthopedic surgery.  She underwent application of multiplanar external fixator LLE, closed treatment left ankle fracture/ dislocation 2/15.  Nephrology has been consulted to see the patient for ESRD on HD.  Patient is known to our service and dialyzes as an outpatient at [facility] via left arm AV fistula.  At present, patient was seen and examined.  She notes that she was standing up coughing and woke up on the floor.  She denies any dizziness.  She denies any chest pain.  She denies any nausea, vomiting, or diarrhea.  Her appetite has been good.\"    Subjective:    2/16/2025: Patient was seen and examined.  She notes that her breathing is okay.  She denies any diarrhea or nausea.    PMH:    Past Medical History:   Diagnosis Date    JOLLY (acute kidney injury) ([facility]) 4/5/2016    AVF (arteriovenous fistula) ([facility]) 02/19/2018    let arm    Chronic kidney disease     Dialysis AV fistula malfunction, initial encounter ([facility]) 11/7/2019    DM type 1 (diabetes mellitus, type 1) ([facility])     Encounter regarding vascular access for dialysis for ESRD ([facility])  2018    ESRD (end stage renal disease) (HCC)     Hemodialysis patient (HCC)     amrita dawson  / graft in left arm    Hypertension     Tobacco abuse 2016       Past Surgical History:   Procedure Laterality Date     SECTION      COLONOSCOPY N/A 2024    COLONOSCOPY DIAGNOSTIC performed by Balaji Gonzalez MD at [facility] ENDOSCOPY    CYST INCISION AND DRAINAGE  2011    perirectal abscess. [facility]. Dr. Fleming    DIALYSIS FISTULA CREATION Left 2019    LEFT ARM FISTULAGRAM, BALLOON ANGIOPLASTY performed by Haylee Zaidi MD at [facility] OR    DILATION AND CURETTAGE OF UTERUS      FRACTURE SURGERY      fracture right ulnar, left tibia, and pelvis    INVASIVE VASCULAR N/A 2023    Fistulogram left performed by Haylee Zaidi MD at [facility] CARDIAC CATH LAB    INVASIVE VASCULAR Left 2023    Angioplasty fistula/dialysis circuit performed by Haylee Zaidi MD at [facility] CARDIAC CATH LAB    OTHER SURGICAL HISTORY      open reduction internal fixation left radial shaft    OTHER SURGICAL HISTORY  2016    pericardial window    OTHER SURGICAL HISTORY  2016     r tesio insertion  / remove 2018    OTHER SURGICAL HISTORY Left 2017    insertion a-v fistula left arm    KS ARTERIOVENOUS ANASTOMOSIS OPEN DIRECT Left 2018    REVISION AV FISTULA - LEFT ARM performed by Haylee Zaidi MD at [facility] OR    KS INSJ NON-TUNNELED CENTRAL VENOUS CATH AGE 5 YR/> N/A 5/10/2018    TESIO CATHETER INSERTION performed by Cornelius Morris MD at [facility] OR    KS VITRECTOMY PARS PLANA REMOVE PRERETINAL MEMBRANE Left 2018    PARS PLANA VITRECTOMY 25 GAUGE, VITREOUS HEMORRHAGE REMOVAL, ENDO LASER, AIR/FLUID  EXCHANGE LEFT EYE performed by Pierce Fierro MD at [facility] OR    RECTAL SURGERY N/A 2021    PERINEAL ABCESS INCISION AND DRAINAGE (LITHOTOMY) performed by Dominic Brower MD at [facility] OR    UPPER GASTROINTESTINAL

## 2025-02-16 NOTE — PROGRESS NOTES
[facility] Hospitalist Progress Note    SYNOPSIS: Patient admitted on 2025 for Closed trimalleolar fracture of left ankle    Ms. Michelle Nettles, a 39 y.o. year old female  who  has a past medical history of ESRD on hemodialysis, DM type 1, Hypertension, and Tobacco abuse.      Patient presented to [place] ED on  with complaints of the left ankle pain after a syncopal episode the day prior to arrival.  She reports she was coughing and then woke up on the floor.  She was able to get up and go about her day without problems aside from persistent left ankle pain.  Of note she did miss her dialysis session yesterday.       In ED she was tachycardic, vitals otherwise stable on baseline oxygen requirements. Laboratory workup consistent with ESRD also significant for hypokalemia, elevated but adynamic troponin, normocytic anemia.  Imaging significant for acute displaced trimalleolar fracture of the left ankle.  Decision was made to transfer here for admission and orthopedic evaluation.     She underwent ex fix application on 2/15.  Undergoing hemodialysis postop.     patient's pain is adequately controlled, external fixator in place.  PT/OT has been consulted.  Will need rehab      SUBJECTIVE:  Stable overnight. No other overnight issues reported.   Patient seen and examined  Records reviewed.           Temp (24hrs), Av.5 °F (36.4 °C), Min:97.2 °F (36.2 °C), Max:97.9 °F (36.6 °C)    DIET: ADULT DIET; Regular  CODE: Prior    Intake/Output Summary (Last 24 hours) at 2025 1220  Last data filed at 2025 0515  Gross per 24 hour   Intake 540 ml   Output 3300 ml   Net -2760 ml       Review of Systems  All bolded are positive; please see HPI  General:  Fever, chills, diaphoresis, fatigue, malaise, night sweats, weight loss  Psychological:  Anxiety, disorientation, hallucinations.  ENT:  Epistaxis, headaches, vertigo, visual changes.  Cardiovascular:  Chest pain, irregular heartbeats,

## 2025-02-16 NOTE — PLAN OF CARE
Problem: Chronic Conditions and Co-morbidities  Goal: Patient's chronic conditions and co-morbidity symptoms are monitored and maintained or improved  2/16/2025 0915 by Luz Marina Li RN  Outcome: Progressing  2/16/2025 0203 by Arielle Moran RN  Outcome: Progressing     Problem: Discharge Planning  Goal: Discharge to home or other facility with appropriate resources  Outcome: Progressing     Problem: Pain  Goal: Verbalizes/displays adequate comfort level or baseline comfort level  2/16/2025 0915 by Luz Marina Li RN  Outcome: Progressing  2/16/2025 0203 by Arielle Moran RN  Outcome: Progressing     Problem: Skin/Tissue Integrity  Goal: Skin integrity remains intact  Description: 1.  Monitor for areas of redness and/or skin breakdown  2.  Assess vascular access sites hourly  3.  Every 4-6 hours minimum:  Change oxygen saturation probe site  4.  Every 4-6 hours:  If on nasal continuous positive airway pressure, respiratory therapy assess nares and determine need for appliance change or resting period  Outcome: Progressing     Problem: Safety - Adult  Goal: Free from fall injury  Outcome: Progressing     Problem: ABCDS Injury Assessment  Goal: Absence of physical injury  Outcome: Progressing

## 2025-02-17 LAB
ALBUMIN SERPL-MCNC: 3.7 G/DL (ref 3.5–5.2)
ALP SERPL-CCNC: 118 U/L (ref 35–104)
ALT SERPL-CCNC: <5 U/L (ref 0–32)
ANION GAP SERPL CALCULATED.3IONS-SCNC: 14 MMOL/L (ref 7–16)
AST SERPL-CCNC: 16 U/L (ref 0–31)
BILIRUB SERPL-MCNC: 0.2 MG/DL (ref 0–1.2)
BUN SERPL-MCNC: 39 MG/DL (ref 6–20)
CALCIUM SERPL-MCNC: 9.3 MG/DL (ref 8.6–10.2)
CHLORIDE SERPL-SCNC: 94 MMOL/L (ref 98–107)
CO2 SERPL-SCNC: 30 MMOL/L (ref 22–29)
CREAT SERPL-MCNC: 8.3 MG/DL (ref 0.5–1)
ERYTHROCYTE [DISTWIDTH] IN BLOOD BY AUTOMATED COUNT: 15.3 % (ref 11.5–15)
GFR, ESTIMATED: 6 ML/MIN/1.73M2
GLUCOSE BLD-MCNC: 305 MG/DL (ref 74–99)
GLUCOSE BLD-MCNC: 353 MG/DL (ref 74–99)
GLUCOSE SERPL-MCNC: 211 MG/DL (ref 74–99)
HCT VFR BLD AUTO: 22.1 % (ref 34–48)
HGB BLD-MCNC: 7.2 G/DL (ref 11.5–15.5)
MAGNESIUM SERPL-MCNC: 2.5 MG/DL (ref 1.6–2.6)
MCH RBC QN AUTO: 30.9 PG (ref 26–35)
MCHC RBC AUTO-ENTMCNC: 32.6 G/DL (ref 32–34.5)
MCV RBC AUTO: 94.8 FL (ref 80–99.9)
PHOSPHATE SERPL-MCNC: 3.8 MG/DL (ref 2.5–4.5)
PLATELET # BLD AUTO: 277 K/UL (ref 130–450)
PMV BLD AUTO: 9.7 FL (ref 7–12)
POTASSIUM SERPL-SCNC: 3.8 MMOL/L (ref 3.5–5)
PROT SERPL-MCNC: 7.5 G/DL (ref 6.4–8.3)
RBC # BLD AUTO: 2.33 M/UL (ref 3.5–5.5)
SODIUM SERPL-SCNC: 138 MMOL/L (ref 132–146)
WBC OTHER # BLD: 8.6 K/UL (ref 4.5–11.5)

## 2025-02-17 PROCEDURE — 82962 GLUCOSE BLOOD TEST: CPT

## 2025-02-17 PROCEDURE — 36415 COLL VENOUS BLD VENIPUNCTURE: CPT

## 2025-02-17 PROCEDURE — 6360000002 HC RX W HCPCS

## 2025-02-17 PROCEDURE — 80053 COMPREHEN METABOLIC PANEL: CPT

## 2025-02-17 PROCEDURE — 6370000000 HC RX 637 (ALT 250 FOR IP)

## 2025-02-17 PROCEDURE — 2500000003 HC RX 250 WO HCPCS: Performed by: NURSE PRACTITIONER

## 2025-02-17 PROCEDURE — 99232 SBSQ HOSP IP/OBS MODERATE 35: CPT | Performed by: STUDENT IN AN ORGANIZED HEALTH CARE EDUCATION/TRAINING PROGRAM

## 2025-02-17 PROCEDURE — 84100 ASSAY OF PHOSPHORUS: CPT

## 2025-02-17 PROCEDURE — 6370000000 HC RX 637 (ALT 250 FOR IP): Performed by: NURSE PRACTITIONER

## 2025-02-17 PROCEDURE — 85027 COMPLETE CBC AUTOMATED: CPT

## 2025-02-17 PROCEDURE — 83735 ASSAY OF MAGNESIUM: CPT

## 2025-02-17 PROCEDURE — 1200000000 HC SEMI PRIVATE

## 2025-02-17 PROCEDURE — 90935 HEMODIALYSIS ONE EVALUATION: CPT

## 2025-02-17 RX ADMIN — MULTIVITAMIN TABLET 1 TABLET: TABLET at 08:09

## 2025-02-17 RX ADMIN — INSULIN GLARGINE 6 UNITS: 100 INJECTION, SOLUTION SUBCUTANEOUS at 20:34

## 2025-02-17 RX ADMIN — CLOPIDOGREL BISULFATE 75 MG: 75 TABLET ORAL at 08:08

## 2025-02-17 RX ADMIN — ACETAMINOPHEN 650 MG: 325 TABLET ORAL at 22:28

## 2025-02-17 RX ADMIN — HYDROMORPHONE HYDROCHLORIDE 0.2 MG: 1 INJECTION, SOLUTION INTRAMUSCULAR; INTRAVENOUS; SUBCUTANEOUS at 16:32

## 2025-02-17 RX ADMIN — INSULIN LISPRO 1 UNITS: 100 INJECTION, SOLUTION INTRAVENOUS; SUBCUTANEOUS at 05:30

## 2025-02-17 RX ADMIN — HYDROMORPHONE HYDROCHLORIDE 0.2 MG: 1 INJECTION, SOLUTION INTRAMUSCULAR; INTRAVENOUS; SUBCUTANEOUS at 03:12

## 2025-02-17 RX ADMIN — ACETAMINOPHEN 650 MG: 325 TABLET ORAL at 16:38

## 2025-02-17 RX ADMIN — PANTOPRAZOLE SODIUM 40 MG: 40 TABLET, DELAYED RELEASE ORAL at 16:38

## 2025-02-17 RX ADMIN — CALCIUM ACETATE 2668 MG: 667 CAPSULE ORAL at 16:37

## 2025-02-17 RX ADMIN — OXYCODONE 5 MG: 5 TABLET ORAL at 22:28

## 2025-02-17 RX ADMIN — OXYCODONE 5 MG: 5 TABLET ORAL at 17:44

## 2025-02-17 RX ADMIN — CARVEDILOL 25 MG: 25 TABLET, FILM COATED ORAL at 08:09

## 2025-02-17 RX ADMIN — Medication 5 MG: at 20:35

## 2025-02-17 RX ADMIN — INSULIN LISPRO 3 UNITS: 100 INJECTION, SOLUTION INTRAVENOUS; SUBCUTANEOUS at 20:34

## 2025-02-17 RX ADMIN — HYDROMORPHONE HYDROCHLORIDE 0.2 MG: 1 INJECTION, SOLUTION INTRAMUSCULAR; INTRAVENOUS; SUBCUTANEOUS at 20:35

## 2025-02-17 RX ADMIN — INSULIN LISPRO 4 UNITS: 100 INJECTION, SOLUTION INTRAVENOUS; SUBCUTANEOUS at 17:11

## 2025-02-17 RX ADMIN — CALCIUM ACETATE 2668 MG: 667 CAPSULE ORAL at 08:08

## 2025-02-17 RX ADMIN — DOCUSATE SODIUM 100 MG: 100 CAPSULE, LIQUID FILLED ORAL at 08:09

## 2025-02-17 RX ADMIN — OXYCODONE 5 MG: 5 TABLET ORAL at 05:30

## 2025-02-17 RX ADMIN — OXYCODONE 5 MG: 5 TABLET ORAL at 09:52

## 2025-02-17 RX ADMIN — HYDROMORPHONE HYDROCHLORIDE 0.2 MG: 1 INJECTION, SOLUTION INTRAMUSCULAR; INTRAVENOUS; SUBCUTANEOUS at 07:11

## 2025-02-17 RX ADMIN — HYDROMORPHONE HYDROCHLORIDE 0.2 MG: 1 INJECTION, SOLUTION INTRAMUSCULAR; INTRAVENOUS; SUBCUTANEOUS at 11:10

## 2025-02-17 RX ADMIN — ACETAMINOPHEN 650 MG: 325 TABLET ORAL at 05:29

## 2025-02-17 RX ADMIN — CYANOCOBALAMIN TAB 1000 MCG 500 MCG: 1000 TAB at 08:09

## 2025-02-17 RX ADMIN — INSULIN LISPRO 6 UNITS: 100 INJECTION, SOLUTION INTRAVENOUS; SUBCUTANEOUS at 17:11

## 2025-02-17 RX ADMIN — SUCRALFATE 1 G: 1 TABLET ORAL at 16:38

## 2025-02-17 RX ADMIN — OXYCODONE 5 MG: 5 TABLET ORAL at 01:25

## 2025-02-17 RX ADMIN — INSULIN LISPRO 6 UNITS: 100 INJECTION, SOLUTION INTRAVENOUS; SUBCUTANEOUS at 05:30

## 2025-02-17 ASSESSMENT — PAIN SCALES - GENERAL
PAINLEVEL_OUTOF10: 7
PAINLEVEL_OUTOF10: 8
PAINLEVEL_OUTOF10: 10
PAINLEVEL_OUTOF10: 6
PAINLEVEL_OUTOF10: 8
PAINLEVEL_OUTOF10: 7
PAINLEVEL_OUTOF10: 9
PAINLEVEL_OUTOF10: 8
PAINLEVEL_OUTOF10: 9
PAINLEVEL_OUTOF10: 7
PAINLEVEL_OUTOF10: 9
PAINLEVEL_OUTOF10: 8
PAINLEVEL_OUTOF10: 7
PAINLEVEL_OUTOF10: 7

## 2025-02-17 ASSESSMENT — PAIN DESCRIPTION - ORIENTATION
ORIENTATION: LEFT

## 2025-02-17 ASSESSMENT — PAIN DESCRIPTION - ONSET
ONSET: ON-GOING
ONSET: ON-GOING

## 2025-02-17 ASSESSMENT — PAIN DESCRIPTION - LOCATION
LOCATION: ANKLE
LOCATION: ANKLE;FOOT
LOCATION: ANKLE

## 2025-02-17 ASSESSMENT — PAIN DESCRIPTION - DESCRIPTORS
DESCRIPTORS: ACHING;DULL;GNAWING
DESCRIPTORS: ACHING
DESCRIPTORS: ACHING
DESCRIPTORS: ACHING;DISCOMFORT;GNAWING
DESCRIPTORS: ACHING;DISCOMFORT;GNAWING
DESCRIPTORS: ACHING;DISCOMFORT;JABBING
DESCRIPTORS: ACHING;DISCOMFORT
DESCRIPTORS: HEAVINESS;ACHING;DISCOMFORT
DESCRIPTORS: ACHING

## 2025-02-17 ASSESSMENT — PAIN DESCRIPTION - FREQUENCY
FREQUENCY: CONTINUOUS
FREQUENCY: CONTINUOUS

## 2025-02-17 ASSESSMENT — PAIN DESCRIPTION - PAIN TYPE
TYPE: SURGICAL PAIN
TYPE: SURGICAL PAIN

## 2025-02-17 NOTE — PLAN OF CARE
Problem: Pain  Goal: Verbalizes/displays adequate comfort level or baseline comfort level  2/16/2025 2214 by Chelsey Faith, RN  Outcome: Progressing  2/16/2025 0915 by Luz Marina Li, RN  Outcome: Progressing

## 2025-02-17 NOTE — FLOWSHEET NOTE
02/17/25 1540   Vital Signs   /69   Temp 97.2 °F (36.2 °C)   Pulse 100   Respirations 18   Weight - Scale 59.5 kg (131 lb 2.8 oz)   Weight Method Bed scale   Percent Weight Change -2.76   Post-Hemodialysis Assessment   Post-Treatment Procedures Blood returned;Access bleeding time < 10 minutes   Machine Disinfection Process Exterior Machine Disinfection   Rinseback Volume (ml) 300 ml   Blood Volume Processed (Liters) 87.6 L   Dialyzer Clearance Lightly streaked   Duration of Treatment (minutes) 240 minutes   Heparin Amount Administered During Treatment (mL) 0 mL   Hemodialysis Intake (ml) 300 ml   Hemodialysis Output (ml) 2000 ml   NET Removed (ml) 1700   Tolerated Treatment Good   Patient Response to Treatment tolerated minimal fluid removal   Bilateral Breath Sounds Diminished   LLE Edema +2   Physician Notified No   Time Off 1530   Patient Disposition Return to room   Observations & Evaluations   Level of Consciousness 0   Oriented X 3

## 2025-02-17 NOTE — PROGRESS NOTES
Occupational Therapy  OT SESSION ATTEMPT     Date:2025  Patient Name: Michelle Nettles  MRN: [id number]  : 1985  Room: [address] [place] [address]     Attempted OT session this date:    [] unavailable due to other medical staff currently with pt   [] on hold per nursing staff   [] on hold per nursing staff secondary to lab / radiology results    [] declined Occupational Therapy  this date due to ___.  Benefits of participation in therapy reviewed with pt.    [x] off unit x2 in PM   [] Other:     Will reattempt OT evaluation at a later time.    Tej Ngo OTR/L #[id number]

## 2025-02-17 NOTE — PROGRESS NOTES
Department of Orthopedic Surgery  Resident Progress Note    Patient seen and examined. Pain controlled. No new complaints.  Denies chest pain, shortness of breath, dizziness/lightheadedness.  Patient resting comfortably bed when evaluated this morning.  She states that her pain is well-controlled however she has moments of severe pain which wake her up in the middle of the night.  She is currently requesting increase in her pain medication.     VITALS:  BP (!) 104/58   Pulse (!) 101   Temp 97.5 °F (36.4 °C) (Temporal)   Resp 18   Wt 61.2 kg (134 lb 14.4 oz)   SpO2 100%   BMI 27.25 kg/m²     General: alert and oriented to person, place and time, well-developed and well-nourished, in no acute distress    MUSCULOSKELETAL:   left lower extremity:  Dressing C/D/I ankle spanning external fixator in place  Compartments soft and compressible  Able to actively flex and extend toes and Ex-Fix  +2/4 DP & PT pulses, Brisk Cap refill, Toes warm and perfused  Distal sensation grossly intact to Peroneals, Sural, Saphenous, and tibial nrs    CBC:   Lab Results   Component Value Date/Time    WBC 8.6 02/17/2025 04:30 AM    HGB 7.2 02/17/2025 04:30 AM    HCT 22.1 02/17/2025 04:30 AM     02/17/2025 04:30 AM     PT/INR:    Lab Results   Component Value Date/Time    PROTIME 11.4 01/30/2024 04:20 PM    INR 1.0 01/30/2024 04:20 PM         ASSESSMENT  S/P left ankle spanning external fixator placement-2/15/2025    PLAN      Continue physical therapy and protocol: NWB -left LE  Deep venous thrombosis prophylaxis -per medicine, early mobilization  Patient okay to be discharged from orthopedic standpoint.  Orthopedics will continue to follow peripherally during the rest of her hospital stay.  Please reach out any questions or concerns  PT/OT  Pain Control: IV and PO  Monitor H&H  D/C Plan: Pending PT and OT recommendations

## 2025-02-17 NOTE — PROGRESS NOTES
2324  Gross per 24 hour   Intake 200 ml   Output 0 ml   Net 200 ml       Review of Systems  All bolded are positive; please see HPI  General:  Fever, chills, diaphoresis, fatigue, malaise, night sweats, weight loss  Psychological:  Anxiety, disorientation, hallucinations.  ENT:  Epistaxis, headaches, vertigo, visual changes.  Ear discomfort  Cardiovascular:  Chest pain, irregular heartbeats, palpitations, paroxysmal nocturnal dyspnea.  Respiratory:  Shortness of breath, coughing, sputum production, hemoptysis, wheezing, orthopnea.  Gastrointestinal:  Nausea, vomiting, diarrhea, heartburn, constipation, abdominal pain, hematemesis, hematochezia, melena, acholic stools  Genito-Urinary:  Dysuria, urgency, frequency, hematuria  Musculoskeletal:  Joint pain, joint stiffness, joint swelling, muscle pain, external fixator in place  Neurology:  Headache, focal neurological deficits, weakness, numbness, paresthesia  Derm:  Rashes, ulcers, excoriations, bruising  Extremities:  Decreased ROM, peripheral edema, mottling      OBJECTIVE:    BP (!) 95/50 Comment: RN notified  Pulse (!) 108   Temp 96.8 °F (36 °C) (Temporal)   Resp 19   Wt 61.2 kg (134 lb 14.4 oz)   SpO2 100%   BMI 27.25 kg/m²     General appearance:  awake, alert, and oriented to person, place, time, and purpose; appears stated age and cooperative; no apparent distress no labored breathing  HEENT:  Conjunctivae/corneas clear.   Neck: Supple. No jugular venous distention.   Respiratory: symmetrical; clear to auscultation bilaterally; no wheezes; no rhonchi; no rales  Cardiovascular: rhythm regular; rate controlled; no murmurs  Abdomen: Soft, nontender, nondistended  Extremities:  peripheral pulses present; no peripheral edema; no ulcers, left lower extremity external fixator in place, dressing clean/dry/intact  Musculoskeletal: No clubbing, cyanosis, no bilateral lower extremity edema. Brisk capillary refill.   Skin:  No rashes  on visible skin  Neurologic:  awake, alert and following commands     ASSESSMENT and PLAN:    Assessment     Cough induced syncope  Follow-up  Left ankle trimalleolar fracture s/p ORIF with external fixator  Type 1 diabetes mellitus  ESRD on hemodialysis  Medical noncompliance  Obesity BMI 27.2  Right ear effusion      Plan  -Monitor on telemetry  -Orthopedic surgery following  -Pain control as needed, defer to orthopedic team  -Nephrology following for inpatient hemodialysis needs  -Transfuse PRBC as indicated, keep hemoglobin more than 7 g/dL  -Dietitian consulted for supplementation  -A1c 6.4, endocrinology consulted for additional recommendations  -PT/OT eval  -Will likely need placement,  following  -Outpatient ENT follow-up for right ear effusion                 DVT Prophylaxis Aspirin, early ambulation   GI Prophylaxis [] PPI,  [] H2 Blocker,  [] Carafate,  [x] Diet/Tube Feeds   Disposition Patient requires continued admission due to pending PT OT eval, placement   MDM [] Low, [] Moderate,[]  High       Medications:  REVIEWED DAILY    Infusion Medications    dextrose       Scheduled Medications    lidocaine-EPINEPHrine  20 mL Other Once    calcium acetate  4 capsule Oral TID WC    carvedilol  25 mg Oral QAM    [Held by provider] clopidogrel  75 mg Oral Daily    docusate sodium  100 mg Oral Daily    ferrous sulfate  325 mg Oral Every Other Day    fluticasone  2 spray Each Nostril Daily    insulin glargine  6 Units SubCUTAneous QHS    insulin lispro  6 Units SubCUTAneous TID AC    insulin lispro  0-4 Units SubCUTAneous 4x Daily AC & HS    multivitamin  1 tablet Oral Daily    pantoprazole  40 mg Oral BID AC    sucralfate  1 g Oral 4x Daily AC & HS    vitamin B-12  500 mcg Oral Daily    melatonin  5 mg Oral QPM    clopidogrel  75 mg Oral Daily    acetaminophen  650 mg Oral 4 times per day     PRN Meds: melatonin, hydrALAZINE, calcium carbonate, Polyvinyl Alcohol-Povidone PF, sodium chloride, benzonatate, benzocaine-menthol,

## 2025-02-17 NOTE — PROGRESS NOTES
[facility]  Nephrology Progress Note    Patient's Name: Michelle Nettles    History of Present Illness from 2/15 consult note:    \"Michelle Nettles is a 39 y.o. female with a past medical history of ESRD, diabetes mellitus, and hypertension.  She presented to the [facility] on 2/14 reportedly for concerns of syncope/fall.  Vital signs on 2/14 includes temperature 98.2, respirations 24, pulse 102, /73, and she was 99% SpO2.  Lab data on 2/14 includes potassium 3.1, CO2 31, BUN 28, creatinine 7.6, glucose 264, and hemoglobin 7.6.  She had a CT head on 2/14 which showed no evidence of an acute or subacute intracranial normality.  XR left ankle 2/14 showed acute trimalleolar fracture of the left ankle.  Chest x-ray from 2/14 showed no acute cardiopulmonary disease.  She reportedly transferred to [facility] on 2/15 and was seen by orthopedic surgery.  She underwent application of multiplanar external fixator LLE, closed treatment left ankle fracture/ dislocation 2/15.  Nephrology has been consulted to see the patient for ESRD on HD.  Patient is known to our service and dialyzes as an outpatient at [facility] via left arm AV fistula.  At present, patient was seen and examined.  She notes that she was standing up coughing and woke up on the floor.  She denies any dizziness.  She denies any chest pain.  She denies any nausea, vomiting, or diarrhea.  Her appetite has been good.\"    Subjective:    2/17/2025: Patient was seen and examined.  Denies shortness of breath; surgical pain is controlled    PMH:    Past Medical History:   Diagnosis Date    JOLLY (acute kidney injury) ([facility]) 4/5/2016    AVF (arteriovenous fistula) ([facility]) 02/19/2018    let arm    Chronic kidney disease     Dialysis AV fistula malfunction, initial encounter ([facility]) 11/7/2019    DM type 1 (diabetes mellitus, type 1) ([facility])     Encounter regarding vascular access for dialysis for ESRD ([facility]) 07/12/2018    ESRD

## 2025-02-17 NOTE — CARE COORDINATION
TELMA transition of care.  Patient presented to [facility] ER secondary to a fall.  Admitted inpatient with left trimalleolar fracture of ankle.  Ortho, nephrology, and endocrine consulted.  S/p left ankle external fixator placement on 2/15.  Pt/Ot evals pending.  Attempted to meet with patient.  Has been off the unit since this morning.  Currently off the unit for hemodialysis. Will meet with her at a later time to discuss discharge planning.  Await therapy evals to assist with discharge planning.  CM/SW to follow.  Haider Oshea RN [phone].

## 2025-02-17 NOTE — PROGRESS NOTES
Physical Therapy    PT order received and medical chart reviewed on 2/17/25. Upon second attempt of PT evaluation (at 1500 on 2/17), pt remains off unit. Will re-attempt as able. Thank you.    Mandy Quevedo, PT, DPT  [id number]

## 2025-02-17 NOTE — PROGRESS NOTES
Date: 2025       Patient Name: Michelle Nettles  : 1985      MRN: [id number]    PT order received and chart reviewed. Unavailable for PT evaluation, pt off the floor.      Chris Davis PT, DPT  [id number]

## 2025-02-18 LAB
ALBUMIN SERPL-MCNC: 3.5 G/DL (ref 3.5–5.2)
ALP SERPL-CCNC: 110 U/L (ref 35–104)
ALT SERPL-CCNC: <5 U/L (ref 0–32)
ANION GAP SERPL CALCULATED.3IONS-SCNC: 11 MMOL/L (ref 7–16)
AST SERPL-CCNC: 12 U/L (ref 0–31)
BILIRUB SERPL-MCNC: <0.2 MG/DL (ref 0–1.2)
BUN SERPL-MCNC: 24 MG/DL (ref 6–20)
CALCIUM SERPL-MCNC: 8.8 MG/DL (ref 8.6–10.2)
CHLORIDE SERPL-SCNC: 96 MMOL/L (ref 98–107)
CO2 SERPL-SCNC: 31 MMOL/L (ref 22–29)
CREAT SERPL-MCNC: 5 MG/DL (ref 0.5–1)
ERYTHROCYTE [DISTWIDTH] IN BLOOD BY AUTOMATED COUNT: 15.3 % (ref 11.5–15)
GFR, ESTIMATED: 11 ML/MIN/1.73M2
GLUCOSE BLD-MCNC: 187 MG/DL (ref 74–99)
GLUCOSE BLD-MCNC: 271 MG/DL (ref 74–99)
GLUCOSE BLD-MCNC: 295 MG/DL (ref 74–99)
GLUCOSE BLD-MCNC: 329 MG/DL (ref 74–99)
GLUCOSE SERPL-MCNC: 316 MG/DL (ref 74–99)
HCT VFR BLD AUTO: 21.7 % (ref 34–48)
HGB BLD-MCNC: 6.9 G/DL (ref 11.5–15.5)
MAGNESIUM SERPL-MCNC: 2.1 MG/DL (ref 1.6–2.6)
MCH RBC QN AUTO: 30.3 PG (ref 26–35)
MCHC RBC AUTO-ENTMCNC: 31.8 G/DL (ref 32–34.5)
MCV RBC AUTO: 95.2 FL (ref 80–99.9)
PHOSPHATE SERPL-MCNC: 2.9 MG/DL (ref 2.5–4.5)
PLATELET # BLD AUTO: 260 K/UL (ref 130–450)
PMV BLD AUTO: 9.8 FL (ref 7–12)
POTASSIUM SERPL-SCNC: 4.2 MMOL/L (ref 3.5–5)
PROT SERPL-MCNC: 7.1 G/DL (ref 6.4–8.3)
RBC # BLD AUTO: 2.28 M/UL (ref 3.5–5.5)
SODIUM SERPL-SCNC: 138 MMOL/L (ref 132–146)
WBC OTHER # BLD: 5.8 K/UL (ref 4.5–11.5)

## 2025-02-18 PROCEDURE — 83735 ASSAY OF MAGNESIUM: CPT

## 2025-02-18 PROCEDURE — P9016 RBC LEUKOCYTES REDUCED: HCPCS

## 2025-02-18 PROCEDURE — 6360000002 HC RX W HCPCS

## 2025-02-18 PROCEDURE — 6370000000 HC RX 637 (ALT 250 FOR IP)

## 2025-02-18 PROCEDURE — 82962 GLUCOSE BLOOD TEST: CPT

## 2025-02-18 PROCEDURE — 99232 SBSQ HOSP IP/OBS MODERATE 35: CPT | Performed by: INTERNAL MEDICINE

## 2025-02-18 PROCEDURE — 36430 TRANSFUSION BLD/BLD COMPNT: CPT

## 2025-02-18 PROCEDURE — 30233N1 TRANSFUSION OF NONAUTOLOGOUS RED BLOOD CELLS INTO PERIPHERAL VEIN, PERCUTANEOUS APPROACH: ICD-10-PCS | Performed by: INTERNAL MEDICINE

## 2025-02-18 PROCEDURE — 97161 PT EVAL LOW COMPLEX 20 MIN: CPT

## 2025-02-18 PROCEDURE — 97530 THERAPEUTIC ACTIVITIES: CPT

## 2025-02-18 PROCEDURE — 6370000000 HC RX 637 (ALT 250 FOR IP): Performed by: NURSE PRACTITIONER

## 2025-02-18 PROCEDURE — 36415 COLL VENOUS BLD VENIPUNCTURE: CPT

## 2025-02-18 PROCEDURE — 2500000003 HC RX 250 WO HCPCS: Performed by: NURSE PRACTITIONER

## 2025-02-18 PROCEDURE — 80053 COMPREHEN METABOLIC PANEL: CPT

## 2025-02-18 PROCEDURE — 84100 ASSAY OF PHOSPHORUS: CPT

## 2025-02-18 PROCEDURE — 1200000000 HC SEMI PRIVATE

## 2025-02-18 PROCEDURE — 97165 OT EVAL LOW COMPLEX 30 MIN: CPT

## 2025-02-18 PROCEDURE — 85027 COMPLETE CBC AUTOMATED: CPT

## 2025-02-18 RX ORDER — INSULIN LISPRO 100 [IU]/ML
3 INJECTION, SOLUTION INTRAVENOUS; SUBCUTANEOUS
Status: DISCONTINUED | OUTPATIENT
Start: 2025-02-19 | End: 2025-02-19

## 2025-02-18 RX ORDER — SODIUM CHLORIDE 9 MG/ML
INJECTION, SOLUTION INTRAVENOUS PRN
Status: DISCONTINUED | OUTPATIENT
Start: 2025-02-18 | End: 2025-02-22 | Stop reason: HOSPADM

## 2025-02-18 RX ORDER — INSULIN LISPRO 100 [IU]/ML
0-6 INJECTION, SOLUTION INTRAVENOUS; SUBCUTANEOUS
Status: DISCONTINUED | OUTPATIENT
Start: 2025-02-19 | End: 2025-02-22 | Stop reason: HOSPADM

## 2025-02-18 RX ORDER — INSULIN GLARGINE 100 [IU]/ML
9 INJECTION, SOLUTION SUBCUTANEOUS
Status: DISCONTINUED | OUTPATIENT
Start: 2025-02-19 | End: 2025-02-19

## 2025-02-18 RX ADMIN — INSULIN LISPRO 2 UNITS: 100 INJECTION, SOLUTION INTRAVENOUS; SUBCUTANEOUS at 12:33

## 2025-02-18 RX ADMIN — HYDROMORPHONE HYDROCHLORIDE 0.2 MG: 1 INJECTION, SOLUTION INTRAMUSCULAR; INTRAVENOUS; SUBCUTANEOUS at 20:13

## 2025-02-18 RX ADMIN — INSULIN LISPRO 3 UNITS: 100 INJECTION, SOLUTION INTRAVENOUS; SUBCUTANEOUS at 08:46

## 2025-02-18 RX ADMIN — CARVEDILOL 25 MG: 25 TABLET, FILM COATED ORAL at 08:47

## 2025-02-18 RX ADMIN — OXYCODONE 5 MG: 5 TABLET ORAL at 08:54

## 2025-02-18 RX ADMIN — HYDROMORPHONE HYDROCHLORIDE 0.2 MG: 1 INJECTION, SOLUTION INTRAMUSCULAR; INTRAVENOUS; SUBCUTANEOUS at 11:32

## 2025-02-18 RX ADMIN — OXYCODONE 5 MG: 5 TABLET ORAL at 18:34

## 2025-02-18 RX ADMIN — OXYCODONE 5 MG: 5 TABLET ORAL at 22:49

## 2025-02-18 RX ADMIN — CYANOCOBALAMIN TAB 1000 MCG 500 MCG: 1000 TAB at 08:47

## 2025-02-18 RX ADMIN — SUCRALFATE 1 G: 1 TABLET ORAL at 20:13

## 2025-02-18 RX ADMIN — Medication 5 MG: at 20:13

## 2025-02-18 RX ADMIN — ACETAMINOPHEN 650 MG: 325 TABLET ORAL at 16:14

## 2025-02-18 RX ADMIN — ACETAMINOPHEN 650 MG: 325 TABLET ORAL at 04:35

## 2025-02-18 RX ADMIN — INSULIN LISPRO 6 UNITS: 100 INJECTION, SOLUTION INTRAVENOUS; SUBCUTANEOUS at 08:45

## 2025-02-18 RX ADMIN — INSULIN LISPRO 6 UNITS: 100 INJECTION, SOLUTION INTRAVENOUS; SUBCUTANEOUS at 12:33

## 2025-02-18 RX ADMIN — CLOPIDOGREL BISULFATE 75 MG: 75 TABLET ORAL at 08:47

## 2025-02-18 RX ADMIN — INSULIN GLARGINE 6 UNITS: 100 INJECTION, SOLUTION SUBCUTANEOUS at 20:14

## 2025-02-18 RX ADMIN — SUCRALFATE 1 G: 1 TABLET ORAL at 16:14

## 2025-02-18 RX ADMIN — OXYCODONE 5 MG: 5 TABLET ORAL at 13:44

## 2025-02-18 RX ADMIN — SUCRALFATE 1 G: 1 TABLET ORAL at 11:36

## 2025-02-18 RX ADMIN — OXYCODONE 5 MG: 5 TABLET ORAL at 04:35

## 2025-02-18 RX ADMIN — DOCUSATE SODIUM 100 MG: 100 CAPSULE, LIQUID FILLED ORAL at 08:47

## 2025-02-18 RX ADMIN — ACETAMINOPHEN 650 MG: 325 TABLET ORAL at 23:49

## 2025-02-18 RX ADMIN — MULTIVITAMIN TABLET 1 TABLET: TABLET at 08:47

## 2025-02-18 RX ADMIN — INSULIN LISPRO 1 UNITS: 100 INJECTION, SOLUTION INTRAVENOUS; SUBCUTANEOUS at 18:19

## 2025-02-18 RX ADMIN — CALCIUM ACETATE 2668 MG: 667 CAPSULE ORAL at 08:47

## 2025-02-18 RX ADMIN — INSULIN LISPRO 2 UNITS: 100 INJECTION, SOLUTION INTRAVENOUS; SUBCUTANEOUS at 20:23

## 2025-02-18 RX ADMIN — HYDROMORPHONE HYDROCHLORIDE 0.2 MG: 1 INJECTION, SOLUTION INTRAMUSCULAR; INTRAVENOUS; SUBCUTANEOUS at 05:39

## 2025-02-18 RX ADMIN — INSULIN LISPRO 6 UNITS: 100 INJECTION, SOLUTION INTRAVENOUS; SUBCUTANEOUS at 18:18

## 2025-02-18 RX ADMIN — HYDROMORPHONE HYDROCHLORIDE 0.2 MG: 1 INJECTION, SOLUTION INTRAMUSCULAR; INTRAVENOUS; SUBCUTANEOUS at 01:05

## 2025-02-18 RX ADMIN — ACETAMINOPHEN 650 MG: 325 TABLET ORAL at 11:35

## 2025-02-18 RX ADMIN — PANTOPRAZOLE SODIUM 40 MG: 40 TABLET, DELAYED RELEASE ORAL at 04:36

## 2025-02-18 RX ADMIN — HYDROMORPHONE HYDROCHLORIDE 0.2 MG: 1 INJECTION, SOLUTION INTRAMUSCULAR; INTRAVENOUS; SUBCUTANEOUS at 16:20

## 2025-02-18 RX ADMIN — PANTOPRAZOLE SODIUM 40 MG: 40 TABLET, DELAYED RELEASE ORAL at 16:14

## 2025-02-18 RX ADMIN — SUCRALFATE 1 G: 1 TABLET ORAL at 04:36

## 2025-02-18 ASSESSMENT — PAIN DESCRIPTION - FREQUENCY
FREQUENCY: CONTINUOUS

## 2025-02-18 ASSESSMENT — PAIN DESCRIPTION - ONSET
ONSET: PROGRESSIVE
ONSET: SUDDEN

## 2025-02-18 ASSESSMENT — PAIN DESCRIPTION - ORIENTATION
ORIENTATION: LEFT

## 2025-02-18 ASSESSMENT — PAIN SCALES - GENERAL
PAINLEVEL_OUTOF10: 8
PAINLEVEL_OUTOF10: 6
PAINLEVEL_OUTOF10: 6
PAINLEVEL_OUTOF10: 4
PAINLEVEL_OUTOF10: 8
PAINLEVEL_OUTOF10: 4
PAINLEVEL_OUTOF10: 8

## 2025-02-18 ASSESSMENT — PAIN DESCRIPTION - LOCATION
LOCATION: FOOT
LOCATION: FOOT
LOCATION: ANKLE
LOCATION: LEG
LOCATION: ANKLE
LOCATION: ANKLE
LOCATION: FOOT
LOCATION: ANKLE

## 2025-02-18 ASSESSMENT — PAIN DESCRIPTION - DESCRIPTORS
DESCRIPTORS: ACHING;DISCOMFORT;CRAMPING
DESCRIPTORS: ACHING;DISCOMFORT
DESCRIPTORS: ACHING
DESCRIPTORS: ACHING;DISCOMFORT;SORE
DESCRIPTORS: SHARP;ITCHING
DESCRIPTORS: ACHING;DISCOMFORT;GNAWING
DESCRIPTORS: ACHING;DISCOMFORT;SORE
DESCRIPTORS: JABBING;NAGGING;ACHING
DESCRIPTORS: ACHING
DESCRIPTORS: ACHING;DISCOMFORT;SORE

## 2025-02-18 ASSESSMENT — PAIN - FUNCTIONAL ASSESSMENT
PAIN_FUNCTIONAL_ASSESSMENT: PREVENTS OR INTERFERES SOME ACTIVE ACTIVITIES AND ADLS
PAIN_FUNCTIONAL_ASSESSMENT: PREVENTS OR INTERFERES WITH MANY ACTIVE NOT PASSIVE ACTIVITIES
PAIN_FUNCTIONAL_ASSESSMENT: PREVENTS OR INTERFERES SOME ACTIVE ACTIVITIES AND ADLS

## 2025-02-18 ASSESSMENT — PAIN DESCRIPTION - PAIN TYPE
TYPE: SURGICAL PAIN

## 2025-02-18 NOTE — PROGRESS NOTES
Occupational Therapy  OCCUPATIONAL THERAPY INITIAL EVALUATION    [facility]  [address], [place]      Date:2025                                                Patient Name: Michelle Nettles  MRN: [id number]  : 1985  Room: [facility]8405    Evaluating OT: Tej Ngo OTR/L #[id number]     Referring Provider:     Trenton Peters MD     Specific Provider Orders/Date: OT eval and treat 25    Diagnosis: Syncope and collapse [R55]  Oxygen dependent [Z99.81]  ESRD (end stage renal disease) ([facility]) [N18.6]  Closed trimalleolar fracture of left ankle, initial encounter [S82.852A]  Anemia due to chronic kidney disease, on chronic dialysis ([facility]) [N18.6, D63.1, Z99.2]  Trimalleolar fracture of ankle, closed, left, initial encounter [S82.852A]   Pt admitted to hospital following syncopal fall; L ankle fx    Surgery / Procedure: S/P left ankle spanning external fixator placement-2/15/2025     Pertinent Medical History:  has a past medical history of JOLLY (acute kidney injury) ([facility]), AVF (arteriovenous fistula) ([facility]), Chronic kidney disease, Dialysis AV fistula malfunction, initial encounter ([facility]), DM type 1 (diabetes mellitus, type 1) ([facility]), Encounter regarding vascular access for dialysis for ESRD ([facility]), ESRD (end stage renal disease) ([facility]), Hemodialysis patient ([facility]), Hypertension, and Tobacco abuse.       Precautions:  Fall Risk, NWB LLE, LLE ex-fix, ,O2 (5L baseline), HD     Assessment of current deficits    [x] Functional mobility          [x]ADLs           [x] Strength                  []Cognition    [x] Functional transfers        [x] IADLs         [x] Safety Awareness   [x]Endurance    [] Fine Coordination                        [x] Balance      [] Vision/perception   []Sensation      []Gross Motor Coordination            [] ROM           [] Delirium                   [] Motor Control      OT PLAN OF CARE   OT POC based on physician  posture, body mechanics, energy conservation techniques and safety and weightbearing status.  Therapist facilitated self-care retraining: UB/LB self-care tasks (robe and sock) and seated grooming tasks at EOB while cuing on modified techniques, posture, safety and energy conservation techniques. Skilled monitoring of HR, O2 sats and pts response to treatment.        Rehab Potential:  Good for established goals     Patient / Family Goal: Regain Johnston     Patient and/or family were instructed on functional diagnosis, prognosis/goals and OT plan of care. Demonstrated fair+ understanding.     Eval Complexity: Low    Time In: 730  Time Out: 755  Total Treatment Time: 10 minutes    Min Units   OT Eval Low 97165  x  1   OT Eval Medium 97348      OT Eval High 31539      OT Re-Eval 57275       Therapeutic Ex 84464       Therapeutic Activities 54381  8  1   ADL/Self Care 95920  2     Orthotic Management 89989       Manual 52864     Neuro Re-Ed 68465       Non-Billable Time          Evaluation Time additionally includes thorough review of current medical information, gathering information on past medical history/social history and prior level of function, interpretation of standardized testing/informal observation of tasks, assessment of data and development of plan of care and goals.          Tej Ngo OTR/L #[id number]

## 2025-02-18 NOTE — CARE COORDINATION
CM update note.  Met with patient at the bedside to discuss discharge planning.  Pt lives with her 10 yo daughter in a single story home with no steps to enter.  PTA she was independent and an active .  Attends dialysis MWF at [facility] on [address] and transports herself.  Went over therapy evals with her.  PT am-pac is 14.  She doesn't know if she can manage at home even with [facility].  She does not have family who can stay with her to help.  She is considering [facility] at discharge.  [facility] list given to her.  Will meet back with her to finalize discharge plan.  Ortho, nephrology, and endocrine consulted. S/p left ankle external fixator placement on 2/15.  CM/SW to follow.  Haider Oshea RN [phone].

## 2025-02-18 NOTE — PROGRESS NOTES
Comprehensive Nutrition Assessment    Type and Reason for Visit:  Initial, Consult (poor PO intake/appetite)    Nutrition Recommendations/Plan:   Recommend and start ONS: Nepro once daily to promote nutrient/PO intake and post op healing.        Nutrition Assessment:    Pt. admitted for left ankle pain s/p syncopal episode/fall. Found to have L acute displaced trimalleolar fracture. Now s/p external fixation with application on 2/15.PMHx CHF, DM, HTN, ESRD/HD, dietary non-compliance. Pt. tolerating diet w/ noted % intake x1. Will start ONS to promote nutrient/PO intake and monitor.    Nutrition Related Findings:    A&Ox4, -I/O(3.8L), distended/rounded abd, active BS, +1/+2 edema, HD, elevated BUN/Cr, hyperglycemia, Wound Type: Surgical Incision (x1)       Current Nutrition Intake & Therapies:    Average Meal Intake: %  Average Supplements Intake: None Ordered  ADULT DIET; Regular    Anthropometric Measures:  Height: 149.9 cm (4' 11.02\")  Ideal Body Weight (IBW): 95 lbs (43 kg)    Admission Body Weight: 61.2 kg (134 lb 14.7 oz) (2/16 BS first measured)  Current Body Weight: 59.5 kg (131 lb 2.8 oz) (2/17 BS), 138.1 % IBW. Weight Source: Bed scale  Current BMI (kg/m2): 26.5  Usual Body Weight:  (UTO d/t fluid shifts/varied wt hx)        Weight Adjustment For: No Adjustment                 BMI Categories: Overweight (BMI 25.0-29.9)    Estimated Daily Nutrient Needs:  Energy Requirements Based On: Formula  Weight Used for Energy Requirements: Current  Energy (kcal/day): 1400-1600kcal (MSJx1.2SF)  Weight Used for Protein Requirements: Ideal  Protein (g/day): 60-70g (1.4-1.6g/kgIBW)  Method Used for Fluid Requirements: 1 ml/kcal  Fluid (ml/day):     Nutrition Diagnosis:   Increased nutrient needs related to increase demand for energy/nutrients as evidenced by wounds, dialysis    Nutrition Interventions:   Food and/or Nutrient Delivery: Continue Current Diet, Start Oral Nutrition Supplement (start ONS:

## 2025-02-18 NOTE — CONSULTS
Endocrine Brief Communication Note         Date of admission: 2/14/2025  Date of service: 2/17/2025  Admitting physician: Trenton Peters MD   Primary Care Physician: Rogers Rodríguez MD  Consultant physician: Brennan Dyer MD      Reason for the consultation:  Poorly controlled diabetes     Subjective:   I attempted to see patient twice this afternoon, the patient was off the floor.    We will see her tomorrow      Brennan Dyer MD  Endocrinologist, [facility]   [address] [place] [address]   Phone: [phone]  Fax: [phone]  -------------------------  An electronic signature was used to authenticate this note. Brennan Dyer MD on 2/17/2025 at 9:40 PM

## 2025-02-18 NOTE — PROGRESS NOTES
[facility]  Nephrology Progress Note    Patient's Name: Michelle Nettles    History of Present Illness from 2/15 consult note:    \"Michelle Nettles is a 39 y.o. female with a past medical history of ESRD, diabetes mellitus, and hypertension.  She presented to the [facility] ED on 2/14 reportedly for concerns of syncope/fall.  Vital signs on 2/14 includes temperature 98.2, respirations 24, pulse 102, /73, and she was 99% SpO2.  Lab data on 2/14 includes potassium 3.1, CO2 31, BUN 28, creatinine 7.6, glucose 264, and hemoglobin 7.6.  She had a CT head on 2/14 which showed no evidence of an acute or subacute intracranial normality.  XR left ankle 2/14 showed acute trimalleolar fracture of the left ankle.  Chest x-ray from 2/14 showed no acute cardiopulmonary disease.  She reportedly transferred to [facility] on 2/15 and was seen by orthopedic surgery.  She underwent application of multiplanar external fixator LLE, closed treatment left ankle fracture/ dislocation 2/15.  Nephrology has been consulted to see the patient for ESRD on HD.  Patient is known to our service and dialyzes as an outpatient at [facility] via left arm AV fistula.  At present, patient was seen and examined.  She notes that she was standing up coughing and woke up on the floor.  She denies any dizziness.  She denies any chest pain.  She denies any nausea, vomiting, or diarrhea.  Her appetite has been good.\"    Subjective:    2/18/2025: Patient was seen and examined.  She is awake and sitting in a chair.  She notes that her appetite is so-so.    PMH:    Past Medical History:   Diagnosis Date    JOLLY (acute kidney injury) ([facility]) 4/5/2016    AVF (arteriovenous fistula) ([facility]) 02/19/2018    let arm    Chronic kidney disease     Dialysis AV fistula malfunction, initial encounter ([facility]) 11/7/2019    DM type 1 (diabetes mellitus, type 1) ([facility])     Encounter regarding vascular access for dialysis for ESRD ([facility])

## 2025-02-18 NOTE — PROGRESS NOTES
[facility] Hospitalist Progress Note    Admitting Date and Time: 2/14/2025 12:45 PM  Admit Dx: Syncope and collapse [R55]  Oxygen dependent [Z99.81]  ESRD (end stage renal disease) ([facility]) [N18.6]  Closed trimalleolar fracture of left ankle, initial encounter [S82.852A]  Anemia due to chronic kidney disease, on chronic dialysis ([facility]) [N18.6, D63.1, Z99.2]  Trimalleolar fracture of ankle, closed, left, initial encounter [S82.852A]    Synopsis: Patient is a 39-year-old female with past medical history of ESRD on hemodialysis, diabetes type 1, hypertension, tobacco use.  She presented to [facility] ED for left ankle pain after syncopal episode.  She was found to have a left acute displaced trimalleolar fracture.  She underwent external fixation with application on 2/15.  Orthopedic surgery and nephrology are following.    Subjective:  Doing okay overall, pain somewhat controlled    ROS: denies fever, chills, cp, sob, n/v, HA unless stated above.      lidocaine-EPINEPHrine  20 mL Other Once    [Held by provider] calcium acetate  4 capsule Oral TID WC    carvedilol  25 mg Oral QAM    [Held by provider] clopidogrel  75 mg Oral Daily    docusate sodium  100 mg Oral Daily    ferrous sulfate  325 mg Oral Every Other Day    fluticasone  2 spray Each Nostril Daily    insulin glargine  6 Units SubCUTAneous QHS    insulin lispro  6 Units SubCUTAneous TID AC    insulin lispro  0-4 Units SubCUTAneous 4x Daily AC & HS    multivitamin  1 tablet Oral Daily    pantoprazole  40 mg Oral BID AC    sucralfate  1 g Oral 4x Daily AC & HS    vitamin B-12  500 mcg Oral Daily    melatonin  5 mg Oral QPM    clopidogrel  75 mg Oral Daily    acetaminophen  650 mg Oral 4 times per day     sodium chloride, , PRN  melatonin, 5 mg, Nightly PRN  hydrALAZINE, 10 mg, Q6H PRN  calcium carbonate, 500 mg, TID PRN  Polyvinyl Alcohol-Povidone PF, 1 drop, PRN  sodium chloride, 1 spray, PRN  benzonatate, 100 mg, TID PRN  benzocaine-menthol, 1 lozenge,

## 2025-02-18 NOTE — PROGRESS NOTES
Physical Therapy  Initial Assessment       Name: Michelle Nettles  : 1985  MRN: [id number]      Date of Service: 2025    Evaluating PT:  Chris Davis PT, DPT  [id number]    Room #:  8405/8405-B  Diagnosis:  Syncope and collapse [R55]  Oxygen dependent [Z99.81]  ESRD (end stage renal disease) ([facility]) [N18.6]  Closed trimalleolar fracture of left ankle, initial encounter [S82.852A]  Anemia due to chronic kidney disease, on chronic dialysis ([facility]) [N18.6, D63.1, Z99.2]  Trimalleolar fracture of ankle, closed, left, initial encounter [S82.852A]  PMHx/PSHx:   has a past medical history of JOLLY (acute kidney injury) ([facility]), AVF (arteriovenous fistula) ([facility]), Chronic kidney disease, Dialysis AV fistula malfunction, initial encounter ([facility]), DM type 1 (diabetes mellitus, type 1) ([facility]), Encounter regarding vascular access for dialysis for ESRD ([facility]), ESRD (end stage renal disease) ([facility]), Hemodialysis patient ([facility]), Hypertension, and Tobacco abuse.  Procedure/Surgery: Application of left ankle spanning external fixator (2/15/25)  Precautions:  Falls, NWB LLE  Equipment Needs:    18\" manual w/c with elevating leg rests, desk length arm rests, foam cushion  WW      SUBJECTIVE:    Pt lives with daughter (12 yo with physical needs) in a single story home with no stairs to enter.  Pt ambulated independently PTA.  Equipment Owned:     OBJECTIVE:   Initial Evaluation  Date: 25 Treatment Short Term/ Long Term   Goals   AM-PAC 6 Clicks      Was pt agreeable to Eval/treatment? Yes     Does pt have pain? Moderate pain LLE     Bed Mobility  Rolling: Dariela  Supine to sit: Dariela  Sit to supine: NT  Scooting: Dariela  Rolling: Independent  Supine to sit: Independent  Sit to supine: Independent  Scooting: Independent   Transfers Sit to stand: Dariela  Stand to sit: Dariela  Stand pivot: Dariela WW  Sit to stand: Independent  Stand to sit: Independent  Stand pivot: Independent   Ambulation    NT  10 feet with AAD Modified Independent

## 2025-02-19 LAB
ABO/RH: NORMAL
ALBUMIN SERPL-MCNC: 3.5 G/DL (ref 3.5–5.2)
ALP SERPL-CCNC: 106 U/L (ref 35–104)
ALT SERPL-CCNC: <5 U/L (ref 0–32)
ANION GAP SERPL CALCULATED.3IONS-SCNC: 14 MMOL/L (ref 7–16)
ANTIBODY SCREEN: NEGATIVE
ARM BAND NUMBER: NORMAL
AST SERPL-CCNC: 12 U/L (ref 0–31)
BILIRUB SERPL-MCNC: 0.2 MG/DL (ref 0–1.2)
BLOOD BANK BLOOD PRODUCT EXPIRATION DATE: NORMAL
BLOOD BANK DISPENSE STATUS: NORMAL
BLOOD BANK ISBT PRODUCT BLOOD TYPE: 5100
BLOOD BANK PRODUCT CODE: NORMAL
BLOOD BANK SAMPLE EXPIRATION: NORMAL
BLOOD BANK UNIT TYPE AND RH: NORMAL
BPU ID: NORMAL
BUN SERPL-MCNC: 35 MG/DL (ref 6–20)
CALCIUM SERPL-MCNC: 9.2 MG/DL (ref 8.6–10.2)
CHLORIDE SERPL-SCNC: 95 MMOL/L (ref 98–107)
CO2 SERPL-SCNC: 28 MMOL/L (ref 22–29)
COMPONENT: NORMAL
CREAT SERPL-MCNC: 7.5 MG/DL (ref 0.5–1)
CROSSMATCH RESULT: NORMAL
ERYTHROCYTE [DISTWIDTH] IN BLOOD BY AUTOMATED COUNT: 16.1 % (ref 11.5–15)
GFR, ESTIMATED: 7 ML/MIN/1.73M2
GLUCOSE BLD-MCNC: 247 MG/DL (ref 74–99)
GLUCOSE BLD-MCNC: 250 MG/DL (ref 74–99)
GLUCOSE BLD-MCNC: 319 MG/DL (ref 74–99)
GLUCOSE BLD-MCNC: 323 MG/DL (ref 74–99)
GLUCOSE SERPL-MCNC: 281 MG/DL (ref 74–99)
HCT VFR BLD AUTO: 26.2 % (ref 34–48)
HCT VFR BLD AUTO: 28.5 % (ref 34–48)
HGB BLD-MCNC: 8.6 G/DL (ref 11.5–15.5)
HGB BLD-MCNC: 9.4 G/DL (ref 11.5–15.5)
MAGNESIUM SERPL-MCNC: 2.2 MG/DL (ref 1.6–2.6)
MCH RBC QN AUTO: 30.3 PG (ref 26–35)
MCHC RBC AUTO-ENTMCNC: 32.8 G/DL (ref 32–34.5)
MCV RBC AUTO: 92.3 FL (ref 80–99.9)
PHOSPHATE SERPL-MCNC: 3.4 MG/DL (ref 2.5–4.5)
PLATELET # BLD AUTO: 306 K/UL (ref 130–450)
PMV BLD AUTO: 9.8 FL (ref 7–12)
POTASSIUM SERPL-SCNC: 4.5 MMOL/L (ref 3.5–5)
PROT SERPL-MCNC: 7.8 G/DL (ref 6.4–8.3)
RBC # BLD AUTO: 2.84 M/UL (ref 3.5–5.5)
SODIUM SERPL-SCNC: 137 MMOL/L (ref 132–146)
TRANSFUSION STATUS: NORMAL
UNIT DIVISION: 0
UNIT ISSUE DATE/TIME: NORMAL
WBC OTHER # BLD: 8.1 K/UL (ref 4.5–11.5)

## 2025-02-19 PROCEDURE — 6370000000 HC RX 637 (ALT 250 FOR IP): Performed by: INTERNAL MEDICINE

## 2025-02-19 PROCEDURE — 83735 ASSAY OF MAGNESIUM: CPT

## 2025-02-19 PROCEDURE — 85014 HEMATOCRIT: CPT

## 2025-02-19 PROCEDURE — 85018 HEMOGLOBIN: CPT

## 2025-02-19 PROCEDURE — 2500000003 HC RX 250 WO HCPCS

## 2025-02-19 PROCEDURE — 85027 COMPLETE CBC AUTOMATED: CPT

## 2025-02-19 PROCEDURE — 90935 HEMODIALYSIS ONE EVALUATION: CPT

## 2025-02-19 PROCEDURE — 6370000000 HC RX 637 (ALT 250 FOR IP): Performed by: NURSE PRACTITIONER

## 2025-02-19 PROCEDURE — 6360000002 HC RX W HCPCS

## 2025-02-19 PROCEDURE — 99232 SBSQ HOSP IP/OBS MODERATE 35: CPT | Performed by: INTERNAL MEDICINE

## 2025-02-19 PROCEDURE — 1200000000 HC SEMI PRIVATE

## 2025-02-19 PROCEDURE — 6370000000 HC RX 637 (ALT 250 FOR IP)

## 2025-02-19 PROCEDURE — 99222 1ST HOSP IP/OBS MODERATE 55: CPT | Performed by: INTERNAL MEDICINE

## 2025-02-19 PROCEDURE — 36415 COLL VENOUS BLD VENIPUNCTURE: CPT

## 2025-02-19 PROCEDURE — 82962 GLUCOSE BLOOD TEST: CPT

## 2025-02-19 PROCEDURE — 84100 ASSAY OF PHOSPHORUS: CPT

## 2025-02-19 PROCEDURE — 80053 COMPREHEN METABOLIC PANEL: CPT

## 2025-02-19 RX ORDER — INSULIN LISPRO 100 [IU]/ML
4 INJECTION, SOLUTION INTRAVENOUS; SUBCUTANEOUS
Status: DISCONTINUED | OUTPATIENT
Start: 2025-02-19 | End: 2025-02-20

## 2025-02-19 RX ORDER — INSULIN GLARGINE 100 [IU]/ML
15 INJECTION, SOLUTION SUBCUTANEOUS
Status: DISCONTINUED | OUTPATIENT
Start: 2025-02-19 | End: 2025-02-19

## 2025-02-19 RX ORDER — INSULIN LISPRO 100 [IU]/ML
5 INJECTION, SOLUTION INTRAVENOUS; SUBCUTANEOUS
Status: DISCONTINUED | OUTPATIENT
Start: 2025-02-19 | End: 2025-02-19

## 2025-02-19 RX ORDER — INSULIN GLARGINE 100 [IU]/ML
10 INJECTION, SOLUTION SUBCUTANEOUS
Status: DISCONTINUED | OUTPATIENT
Start: 2025-02-19 | End: 2025-02-22 | Stop reason: HOSPADM

## 2025-02-19 RX ADMIN — OXYCODONE 5 MG: 5 TABLET ORAL at 05:47

## 2025-02-19 RX ADMIN — HYDROMORPHONE HYDROCHLORIDE 0.2 MG: 1 INJECTION, SOLUTION INTRAMUSCULAR; INTRAVENOUS; SUBCUTANEOUS at 10:32

## 2025-02-19 RX ADMIN — MULTIVITAMIN TABLET 1 TABLET: TABLET at 11:33

## 2025-02-19 RX ADMIN — CYANOCOBALAMIN TAB 1000 MCG 500 MCG: 1000 TAB at 11:32

## 2025-02-19 RX ADMIN — CLOPIDOGREL BISULFATE 75 MG: 75 TABLET ORAL at 11:32

## 2025-02-19 RX ADMIN — OXYCODONE 5 MG: 5 TABLET ORAL at 11:33

## 2025-02-19 RX ADMIN — INSULIN LISPRO 2 UNITS: 100 INJECTION, SOLUTION INTRAVENOUS; SUBCUTANEOUS at 11:37

## 2025-02-19 RX ADMIN — PANTOPRAZOLE SODIUM 40 MG: 40 TABLET, DELAYED RELEASE ORAL at 05:47

## 2025-02-19 RX ADMIN — PANTOPRAZOLE SODIUM 40 MG: 40 TABLET, DELAYED RELEASE ORAL at 16:50

## 2025-02-19 RX ADMIN — INSULIN GLARGINE 10 UNITS: 100 INJECTION, SOLUTION SUBCUTANEOUS at 22:17

## 2025-02-19 RX ADMIN — HYDROMORPHONE HYDROCHLORIDE 0.2 MG: 1 INJECTION, SOLUTION INTRAMUSCULAR; INTRAVENOUS; SUBCUTANEOUS at 15:47

## 2025-02-19 RX ADMIN — SUCRALFATE 1 G: 1 TABLET ORAL at 05:47

## 2025-02-19 RX ADMIN — ACETAMINOPHEN 650 MG: 325 TABLET ORAL at 16:50

## 2025-02-19 RX ADMIN — SUCRALFATE 1 G: 1 TABLET ORAL at 11:32

## 2025-02-19 RX ADMIN — HYDROMORPHONE HYDROCHLORIDE 0.2 MG: 1 INJECTION, SOLUTION INTRAMUSCULAR; INTRAVENOUS; SUBCUTANEOUS at 02:38

## 2025-02-19 RX ADMIN — INSULIN LISPRO 2 UNITS: 100 INJECTION, SOLUTION INTRAVENOUS; SUBCUTANEOUS at 20:25

## 2025-02-19 RX ADMIN — INSULIN LISPRO 4 UNITS: 100 INJECTION, SOLUTION INTRAVENOUS; SUBCUTANEOUS at 16:51

## 2025-02-19 RX ADMIN — INSULIN LISPRO 4 UNITS: 100 INJECTION, SOLUTION INTRAVENOUS; SUBCUTANEOUS at 16:49

## 2025-02-19 RX ADMIN — ACETAMINOPHEN 650 MG: 325 TABLET ORAL at 05:47

## 2025-02-19 RX ADMIN — Medication 5 MG: at 20:25

## 2025-02-19 RX ADMIN — ACETAMINOPHEN 650 MG: 325 TABLET ORAL at 11:32

## 2025-02-19 RX ADMIN — HYDROMORPHONE HYDROCHLORIDE 0.2 MG: 1 INJECTION, SOLUTION INTRAMUSCULAR; INTRAVENOUS; SUBCUTANEOUS at 20:26

## 2025-02-19 RX ADMIN — FLUTICASONE PROPIONATE 2 SPRAY: 50 SPRAY, METERED NASAL at 12:38

## 2025-02-19 RX ADMIN — INSULIN LISPRO 5 UNITS: 100 INJECTION, SOLUTION INTRAVENOUS; SUBCUTANEOUS at 11:31

## 2025-02-19 RX ADMIN — OXYCODONE 5 MG: 5 TABLET ORAL at 16:51

## 2025-02-19 RX ADMIN — CARVEDILOL 25 MG: 25 TABLET, FILM COATED ORAL at 11:32

## 2025-02-19 RX ADMIN — DOCUSATE SODIUM 100 MG: 100 CAPSULE, LIQUID FILLED ORAL at 11:33

## 2025-02-19 RX ADMIN — SUCRALFATE 1 G: 1 TABLET ORAL at 20:25

## 2025-02-19 RX ADMIN — SUCRALFATE 1 G: 1 TABLET ORAL at 16:50

## 2025-02-19 RX ADMIN — OXYCODONE 5 MG: 5 TABLET ORAL at 22:18

## 2025-02-19 ASSESSMENT — PAIN DESCRIPTION - ONSET
ONSET: ON-GOING

## 2025-02-19 ASSESSMENT — PAIN DESCRIPTION - LOCATION
LOCATION: ANKLE
LOCATION: LEG
LOCATION: LEG;ANKLE
LOCATION: ANKLE
LOCATION: LEG

## 2025-02-19 ASSESSMENT — PAIN DESCRIPTION - PAIN TYPE
TYPE: ACUTE PAIN
TYPE: SURGICAL PAIN;ACUTE PAIN
TYPE: SURGICAL PAIN;ACUTE PAIN

## 2025-02-19 ASSESSMENT — PAIN SCALES - GENERAL
PAINLEVEL_OUTOF10: 10
PAINLEVEL_OUTOF10: 9
PAINLEVEL_OUTOF10: 8
PAINLEVEL_OUTOF10: 8
PAINLEVEL_OUTOF10: 5
PAINLEVEL_OUTOF10: 7
PAINLEVEL_OUTOF10: 6
PAINLEVEL_OUTOF10: 8
PAINLEVEL_OUTOF10: 6
PAINLEVEL_OUTOF10: 7
PAINLEVEL_OUTOF10: 6
PAINLEVEL_OUTOF10: 10

## 2025-02-19 ASSESSMENT — PAIN DESCRIPTION - DESCRIPTORS
DESCRIPTORS: ACHING;DISCOMFORT;SPASM
DESCRIPTORS: ACHING
DESCRIPTORS: ACHING
DESCRIPTORS: ACHING;DISCOMFORT;SHARP;SHOOTING
DESCRIPTORS: ACHING;DISCOMFORT;SHOOTING

## 2025-02-19 ASSESSMENT — PAIN DESCRIPTION - FREQUENCY
FREQUENCY: CONTINUOUS

## 2025-02-19 ASSESSMENT — PAIN DESCRIPTION - ORIENTATION
ORIENTATION: LEFT
ORIENTATION: LEFT;RIGHT
ORIENTATION: LEFT

## 2025-02-19 ASSESSMENT — PAIN - FUNCTIONAL ASSESSMENT
PAIN_FUNCTIONAL_ASSESSMENT: PREVENTS OR INTERFERES SOME ACTIVE ACTIVITIES AND ADLS

## 2025-02-19 NOTE — FLOWSHEET NOTE
02/19/25 1053   Vital Signs   /76   Temp 96.9 °F (36.1 °C)   Pulse (!) 103   Respirations 16   Weight - Scale 64.9 kg (143 lb 1.3 oz)   Weight Method Bed scale   Percent Weight Change 9.08   Post-Hemodialysis Assessment   Post-Treatment Procedures Blood returned;Access bleeding time < 10 minutes   Machine Disinfection Process Exterior Machine Disinfection   Rinseback Volume (ml) 300 ml   Blood Volume Processed (Liters) 87.3 L   Dialyzer Clearance Lightly streaked   Duration of Treatment (minutes) 240 minutes   Heparin Amount Administered During Treatment (mL) 0 mL   Hemodialysis Intake (ml) 300 ml   Hemodialysis Output (ml) 2800 ml   NET Removed (ml) 2500   Tolerated Treatment Good   Patient Response to Treatment tolerated well, blood returned, needles pulled sites held, stasis achieved, bandaids applied, +thirll, +bruit

## 2025-02-19 NOTE — PROGRESS NOTES
Occupational Therapy  Date:2025  Patient Name: Michelle Nettles  MRN: [id number]  : 1985  Room: [address] [place] [address]    Pt's chart reviewed and treatment attempted however pt deferred treatment this PM d/t fatigue from dialysis.  Pt educated on benefits of therapy.  Will attempt treatment at a later date/time.    Bebe CARCAMO/RAD [id number]

## 2025-02-19 NOTE — PROGRESS NOTES
ENDOCRINOLOGY INITIAL CONSULTATION NOTE      Date of admission: 2/14/2025  Date of service: 2/19/2025  Admitting physician: Trenton Peters MD   Primary Care Physician: Rogers Rodríguez MD  Consultant physician: Brennan Dyer MD     Reason for the consultation:  Uncontrolled DM    History of Present Illness:  The history is provided by the patient. Accuracy of the patient data is excellent    Michelle Nettles is a very pleasant 39 y.o. old female with PMH of poorly controlled diabetes, end-stage renal disease on hemodialysis, hypertension and other listed below admitted to [facility] on 2/14/2025 following a fall and found to have Left ankle trimalleolar fracture , endocrine service was consulted for diabetes management.  The patient underwent open reduction internal fixation on admission.  She received 10 mg dexamethasone during the procedure which spiked her glucose level    Prior to admission  The patient was diagnosed with type 1 DM at the age 59. Prior to admission patient was on Lantus 8 units on the dialysis day, 12 units in the other days and Humalog 6 units 3 times daily with meals plus low-dose sliding scale ACHS. Patient has not been eating consistent carbohydrate meals, self-blood glucose monitoring has been above goal prior to admission.  The patient reports both micro and macrovascular complications. The patient is not up to date with yearly diabetic eye exam.  Lab Results   Component Value Date/Time    LABA1C 6.4 01/28/2025 11:08 AM       Inpatient diet:   Carb Restricted diet     Point of care glucose monitoring   (Independently reviewed)   Recent Labs     02/18/25  0530 02/18/25  1114 02/18/25  1709 02/18/25 2016 02/19/25  0545 02/19/25  1119 02/19/25  1646 02/19/25 2016   POCGLU 329* 295* 187* 271* 323* 250* 319* 247*       Past medical history:   Past Medical History:   Diagnosis Date    JOLLY (acute kidney injury) 4/5/2016    AVF (arteriovenous fistula) 02/19/2018    let arm    Chronic kidney

## 2025-02-19 NOTE — PROGRESS NOTES
[facility] Hospitalist Progress Note    Admitting Date and Time: 2/14/2025 12:45 PM  Admit Dx: Syncope and collapse [R55]  Oxygen dependent [Z99.81]  ESRD (end stage renal disease) ([facility]) [N18.6]  Closed trimalleolar fracture of left ankle, initial encounter [S82.852A]  Anemia due to chronic kidney disease, on chronic dialysis ([facility]) [N18.6, D63.1, Z99.2]  Trimalleolar fracture of ankle, closed, left, initial encounter [S82.852A]    Synopsis: Patient is a 39-year-old female with past medical history of ESRD on hemodialysis, diabetes type 1, hypertension, tobacco use.  She presented to [place] ED for left ankle pain after syncopal episode.  She was found to have a left acute displaced trimalleolar fracture.  She underwent external fixation with application on 2/15.  Orthopedic surgery and nephrology are following.    Subjective:  Doing okay overall, pain somewhat controlled  Had HD this morning   Asking to have regular diet     ROS: denies fever, chills, cp, sob, n/v, HA unless stated above.      insulin lispro  5 Units SubCUTAneous TID AC    insulin glargine  15 Units SubCUTAneous QHS    insulin lispro  0-6 Units SubCUTAneous 4x Daily AC & HS    lidocaine-EPINEPHrine  20 mL Other Once    [Held by provider] calcium acetate  4 capsule Oral TID WC    carvedilol  25 mg Oral QAM    docusate sodium  100 mg Oral Daily    ferrous sulfate  325 mg Oral Every Other Day    fluticasone  2 spray Each Nostril Daily    multivitamin  1 tablet Oral Daily    pantoprazole  40 mg Oral BID AC    sucralfate  1 g Oral 4x Daily AC & HS    vitamin B-12  500 mcg Oral Daily    melatonin  5 mg Oral QPM    clopidogrel  75 mg Oral Daily    acetaminophen  650 mg Oral 4 times per day     sodium chloride, , PRN  melatonin, 5 mg, Nightly PRN  hydrALAZINE, 10 mg, Q6H PRN  calcium carbonate, 500 mg, TID PRN  Polyvinyl Alcohol-Povidone PF, 1 drop, PRN  sodium chloride, 1 spray, PRN  benzonatate, 100 mg, TID PRN  benzocaine-menthol, 1 lozenge,  and posterior margin of distal   tibia.   3. Loose intra-articular fracture fragment is seen along margin of the medial   talar dome.         CT 3D RECONSTRUCTION   Final Result   1. Satisfactory alignment of the ankle with external fixator present.   2. Redemonstration of fractures involving distal fibular diaphysis, medial   malleolus, lateral margin of distal tibia, and posterior margin of distal   tibia.   3. Loose intra-articular fracture fragment is seen along margin of the medial   talar dome.         FLUORO FOR SURGICAL PROCEDURES   Final Result   Intraprocedural fluoroscopic spot images as above.  See separate procedure   report for more information.         XR ANKLE LEFT (MIN 3 VIEWS)   Final Result   No significant change in the displaced fractures of the distal fibula and   medial malleolus with disruption of the ankle mortise. Overlying cast   obscures some bony detail.         CT HEAD WO CONTRAST   Final Result   1. No evidence of an acute or subacute intracranial abnormality.   2. Right-sided mastoiditis.         CT CERVICAL SPINE WO CONTRAST   Final Result   No acute abnormality of the cervical spine.         XR CHEST PORTABLE   Final Result   No acute cardiopulmonary disease.         XR ANKLE LEFT (MIN 3 VIEWS)   Final Result   1. Acute trimalleolar fracture of the left ankle with minimal lateral   subluxation of the talus in relation to the tibia.              Assessment:    Principal Problem:    Closed trimalleolar fracture of left ankle  Active Problems:    ESRD (end stage renal disease) ([facility])    ESRD needing dialysis ([facility])    Syncope and collapse    Oxygen dependent    Trimalleolar fracture of ankle, closed, left, initial encounter  Resolved Problems:    * No resolved hospital problems. *      Plan:    Left ankle trimalleolar fracture status post ORIF with external fixator orthopedic surgery following, pain control per surgical team  Syncope, coughing use?  Monitor on telemetry  Chronic anemia

## 2025-02-19 NOTE — CARE COORDINATION
CM update note.  Met with patient.  Patient reports that she will require assistance from a HHA at discharge.  She also reports that she will need transportation to and from dialysis.  Discussed that home care can be ordered but they will not be able to be there every day.  Discussed [facility] again for short term rehab.  Went over facilities that have in house dialysis.  She is agreeable to looking into [facility] [place].  Referral called to Ashley with [facility].  Patient attends dialysis MWF at [facility] on [address] and normally transports herself. Ortho, nephrology, and endocrine consulted. S/p left ankle external fixator placement on 2/15.  CM/SW to follow.  Haider Oshea RN [phone].

## 2025-02-19 NOTE — PROGRESS NOTES
[facility]  Nephrology Progress Note    Patient's Name: Michelle Nettles    History of Present Illness from 2/15 consult note:    \"Michelle Nettles is a 39 y.o. female with a past medical history of ESRD, diabetes mellitus, and hypertension.  She presented to the [facility] ED on 2/14 reportedly for concerns of syncope/fall.  Vital signs on 2/14 includes temperature 98.2, respirations 24, pulse 102, /73, and she was 99% SpO2.  Lab data on 2/14 includes potassium 3.1, CO2 31, BUN 28, creatinine 7.6, glucose 264, and hemoglobin 7.6.  She had a CT head on 2/14 which showed no evidence of an acute or subacute intracranial normality.  XR left ankle 2/14 showed acute trimalleolar fracture of the left ankle.  Chest x-ray from 2/14 showed no acute cardiopulmonary disease.  She reportedly transferred to [facility] on 2/15 and was seen by orthopedic surgery.  She underwent application of multiplanar external fixator LLE, closed treatment left ankle fracture/ dislocation 2/15.  Nephrology has been consulted to see the patient for ESRD on HD.  Patient is known to our service and dialyzes as an outpatient at [facility] via left arm AV fistula.  At present, patient was seen and examined.  She notes that she was standing up coughing and woke up on the floor.  She denies any dizziness.  She denies any chest pain.  She denies any nausea, vomiting, or diarrhea.  Her appetite has been good.\"    Subjective:    2/19/2025: Patient was seen and examined.  She reports that she feels okay and notes that her breathing is okay.  HD today    PMH:    Past Medical History:   Diagnosis Date    JOLLY (acute kidney injury) 4/5/2016    AVF (arteriovenous fistula) 02/19/2018    let arm    Chronic kidney disease     Dialysis AV fistula malfunction, initial encounter 11/7/2019    DM type 1 (diabetes mellitus, type 1) ([facility])     Encounter regarding vascular access for dialysis for ESRD ([facility]) 07/12/2018

## 2025-02-20 ENCOUNTER — TELEPHONE (OUTPATIENT)
Dept: ENDOCRINOLOGY | Age: 40
End: 2025-02-20

## 2025-02-20 LAB
ALBUMIN SERPL-MCNC: 3.5 G/DL (ref 3.5–5.2)
ALP SERPL-CCNC: 108 U/L (ref 35–104)
ALT SERPL-CCNC: <5 U/L (ref 0–32)
ANION GAP SERPL CALCULATED.3IONS-SCNC: 12 MMOL/L (ref 7–16)
AST SERPL-CCNC: 19 U/L (ref 0–31)
BILIRUB SERPL-MCNC: 0.2 MG/DL (ref 0–1.2)
BUN SERPL-MCNC: 28 MG/DL (ref 6–20)
CALCIUM SERPL-MCNC: 9 MG/DL (ref 8.6–10.2)
CHLORIDE SERPL-SCNC: 92 MMOL/L (ref 98–107)
CO2 SERPL-SCNC: 29 MMOL/L (ref 22–29)
CREAT SERPL-MCNC: 4.9 MG/DL (ref 0.5–1)
ERYTHROCYTE [DISTWIDTH] IN BLOOD BY AUTOMATED COUNT: 15.8 % (ref 11.5–15)
GFR, ESTIMATED: 11 ML/MIN/1.73M2
GLUCOSE BLD-MCNC: 133 MG/DL (ref 74–99)
GLUCOSE BLD-MCNC: 183 MG/DL (ref 74–99)
GLUCOSE BLD-MCNC: 185 MG/DL (ref 74–99)
GLUCOSE BLD-MCNC: 262 MG/DL (ref 74–99)
GLUCOSE BLD-MCNC: 306 MG/DL (ref 74–99)
GLUCOSE BLD-MCNC: 44 MG/DL (ref 74–99)
GLUCOSE BLD-MCNC: 59 MG/DL (ref 74–99)
GLUCOSE SERPL-MCNC: 299 MG/DL (ref 74–99)
HCT VFR BLD AUTO: 26.4 % (ref 34–48)
HGB BLD-MCNC: 8.5 G/DL (ref 11.5–15.5)
MAGNESIUM SERPL-MCNC: 2 MG/DL (ref 1.6–2.6)
MCH RBC QN AUTO: 29.4 PG (ref 26–35)
MCHC RBC AUTO-ENTMCNC: 32.2 G/DL (ref 32–34.5)
MCV RBC AUTO: 91.3 FL (ref 80–99.9)
PHOSPHATE SERPL-MCNC: 3.9 MG/DL (ref 2.5–4.5)
PLATELET # BLD AUTO: 305 K/UL (ref 130–450)
PMV BLD AUTO: 9.6 FL (ref 7–12)
POTASSIUM SERPL-SCNC: 4.5 MMOL/L (ref 3.5–5)
PROT SERPL-MCNC: 7.4 G/DL (ref 6.4–8.3)
RBC # BLD AUTO: 2.89 M/UL (ref 3.5–5.5)
SODIUM SERPL-SCNC: 133 MMOL/L (ref 132–146)
WBC OTHER # BLD: 6.6 K/UL (ref 4.5–11.5)

## 2025-02-20 PROCEDURE — 82962 GLUCOSE BLOOD TEST: CPT

## 2025-02-20 PROCEDURE — 99232 SBSQ HOSP IP/OBS MODERATE 35: CPT | Performed by: INTERNAL MEDICINE

## 2025-02-20 PROCEDURE — 84100 ASSAY OF PHOSPHORUS: CPT

## 2025-02-20 PROCEDURE — 6370000000 HC RX 637 (ALT 250 FOR IP)

## 2025-02-20 PROCEDURE — 2500000003 HC RX 250 WO HCPCS

## 2025-02-20 PROCEDURE — 6360000002 HC RX W HCPCS

## 2025-02-20 PROCEDURE — 97530 THERAPEUTIC ACTIVITIES: CPT

## 2025-02-20 PROCEDURE — 6370000000 HC RX 637 (ALT 250 FOR IP): Performed by: INTERNAL MEDICINE

## 2025-02-20 PROCEDURE — 83735 ASSAY OF MAGNESIUM: CPT

## 2025-02-20 PROCEDURE — 36415 COLL VENOUS BLD VENIPUNCTURE: CPT

## 2025-02-20 PROCEDURE — 1200000000 HC SEMI PRIVATE

## 2025-02-20 PROCEDURE — 6370000000 HC RX 637 (ALT 250 FOR IP): Performed by: NURSE PRACTITIONER

## 2025-02-20 PROCEDURE — 80053 COMPREHEN METABOLIC PANEL: CPT

## 2025-02-20 PROCEDURE — 85027 COMPLETE CBC AUTOMATED: CPT

## 2025-02-20 PROCEDURE — 97535 SELF CARE MNGMENT TRAINING: CPT

## 2025-02-20 RX ORDER — INSULIN LISPRO 100 [IU]/ML
2 INJECTION, SOLUTION INTRAVENOUS; SUBCUTANEOUS
Status: DISCONTINUED | OUTPATIENT
Start: 2025-02-20 | End: 2025-02-21

## 2025-02-20 RX ORDER — GLUCAGON 1 MG/ML
KIT INJECTION
Status: COMPLETED
Start: 2025-02-20 | End: 2025-02-20

## 2025-02-20 RX ADMIN — CARVEDILOL 25 MG: 25 TABLET, FILM COATED ORAL at 08:56

## 2025-02-20 RX ADMIN — PANTOPRAZOLE SODIUM 40 MG: 40 TABLET, DELAYED RELEASE ORAL at 16:53

## 2025-02-20 RX ADMIN — HYDROMORPHONE HYDROCHLORIDE 0.2 MG: 1 INJECTION, SOLUTION INTRAMUSCULAR; INTRAVENOUS; SUBCUTANEOUS at 05:22

## 2025-02-20 RX ADMIN — CLOPIDOGREL BISULFATE 75 MG: 75 TABLET ORAL at 08:56

## 2025-02-20 RX ADMIN — CYANOCOBALAMIN TAB 1000 MCG 500 MCG: 1000 TAB at 08:57

## 2025-02-20 RX ADMIN — SUCRALFATE 1 G: 1 TABLET ORAL at 16:53

## 2025-02-20 RX ADMIN — SUCRALFATE 1 G: 1 TABLET ORAL at 20:44

## 2025-02-20 RX ADMIN — SUCRALFATE 1 G: 1 TABLET ORAL at 10:09

## 2025-02-20 RX ADMIN — MULTIVITAMIN TABLET 1 TABLET: TABLET at 08:56

## 2025-02-20 RX ADMIN — ACETAMINOPHEN 650 MG: 325 TABLET ORAL at 16:52

## 2025-02-20 RX ADMIN — INSULIN LISPRO 1 UNITS: 100 INJECTION, SOLUTION INTRAVENOUS; SUBCUTANEOUS at 12:56

## 2025-02-20 RX ADMIN — ACETAMINOPHEN 650 MG: 325 TABLET ORAL at 00:01

## 2025-02-20 RX ADMIN — Medication 5 MG: at 20:44

## 2025-02-20 RX ADMIN — OXYCODONE 5 MG: 5 TABLET ORAL at 04:30

## 2025-02-20 RX ADMIN — INSULIN LISPRO 4 UNITS: 100 INJECTION, SOLUTION INTRAVENOUS; SUBCUTANEOUS at 08:56

## 2025-02-20 RX ADMIN — OXYCODONE 5 MG: 5 TABLET ORAL at 12:55

## 2025-02-20 RX ADMIN — ACETAMINOPHEN 650 MG: 325 TABLET ORAL at 05:22

## 2025-02-20 RX ADMIN — ACETAMINOPHEN 650 MG: 325 TABLET ORAL at 10:10

## 2025-02-20 RX ADMIN — OXYCODONE 5 MG: 5 TABLET ORAL at 17:54

## 2025-02-20 RX ADMIN — PANTOPRAZOLE SODIUM 40 MG: 40 TABLET, DELAYED RELEASE ORAL at 05:22

## 2025-02-20 RX ADMIN — INSULIN GLARGINE 10 UNITS: 100 INJECTION, SOLUTION SUBCUTANEOUS at 21:01

## 2025-02-20 RX ADMIN — INSULIN LISPRO 3 UNITS: 100 INJECTION, SOLUTION INTRAVENOUS; SUBCUTANEOUS at 21:01

## 2025-02-20 RX ADMIN — HYDROMORPHONE HYDROCHLORIDE 0.2 MG: 1 INJECTION, SOLUTION INTRAMUSCULAR; INTRAVENOUS; SUBCUTANEOUS at 10:10

## 2025-02-20 RX ADMIN — OXYCODONE 5 MG: 5 TABLET ORAL at 08:57

## 2025-02-20 RX ADMIN — HYDROMORPHONE HYDROCHLORIDE 0.2 MG: 1 INJECTION, SOLUTION INTRAMUSCULAR; INTRAVENOUS; SUBCUTANEOUS at 20:42

## 2025-02-20 RX ADMIN — OXYCODONE 5 MG: 5 TABLET ORAL at 22:14

## 2025-02-20 RX ADMIN — INSULIN LISPRO 4 UNITS: 100 INJECTION, SOLUTION INTRAVENOUS; SUBCUTANEOUS at 12:55

## 2025-02-20 RX ADMIN — ACETAMINOPHEN 650 MG: 325 TABLET ORAL at 22:14

## 2025-02-20 RX ADMIN — HYDROMORPHONE HYDROCHLORIDE 0.2 MG: 1 INJECTION, SOLUTION INTRAMUSCULAR; INTRAVENOUS; SUBCUTANEOUS at 00:59

## 2025-02-20 RX ADMIN — SUCRALFATE 1 G: 1 TABLET ORAL at 05:22

## 2025-02-20 RX ADMIN — GLUCAGON 1 MG: 1 INJECTION, POWDER, LYOPHILIZED, FOR SOLUTION INTRAMUSCULAR; INTRAVENOUS at 14:26

## 2025-02-20 RX ADMIN — INSULIN LISPRO 2 UNITS: 100 INJECTION, SOLUTION INTRAVENOUS; SUBCUTANEOUS at 16:54

## 2025-02-20 RX ADMIN — DOCUSATE SODIUM 100 MG: 100 CAPSULE, LIQUID FILLED ORAL at 08:56

## 2025-02-20 RX ADMIN — INSULIN LISPRO 4 UNITS: 100 INJECTION, SOLUTION INTRAVENOUS; SUBCUTANEOUS at 08:57

## 2025-02-20 ASSESSMENT — PAIN DESCRIPTION - LOCATION
LOCATION: LEG
LOCATION: ANKLE
LOCATION: ANKLE
LOCATION: LEG;ANKLE
LOCATION: ANKLE
LOCATION: LEG
LOCATION: ANKLE
LOCATION: LEG

## 2025-02-20 ASSESSMENT — PAIN DESCRIPTION - DESCRIPTORS
DESCRIPTORS: ACHING;DISCOMFORT;SHARP
DESCRIPTORS: ACHING
DESCRIPTORS: ACHING;SORE;SHARP
DESCRIPTORS: ACHING

## 2025-02-20 ASSESSMENT — PAIN SCALES - GENERAL
PAINLEVEL_OUTOF10: 10
PAINLEVEL_OUTOF10: 8
PAINLEVEL_OUTOF10: 10
PAINLEVEL_OUTOF10: 6
PAINLEVEL_OUTOF10: 7
PAINLEVEL_OUTOF10: 9
PAINLEVEL_OUTOF10: 10
PAINLEVEL_OUTOF10: 8
PAINLEVEL_OUTOF10: 9

## 2025-02-20 ASSESSMENT — PAIN DESCRIPTION - ORIENTATION
ORIENTATION: LEFT

## 2025-02-20 ASSESSMENT — PAIN SCALES - WONG BAKER
WONGBAKER_NUMERICALRESPONSE: HURTS A LITTLE BIT
WONGBAKER_NUMERICALRESPONSE: HURTS A LITTLE BIT

## 2025-02-20 NOTE — PROGRESS NOTES
During PT/OT session today pt became unresponsive and she was helped back into bed. Vitals were taken and stayed stable. Glucose was found to be 44 see MAR for PRN orders given. Patient quickly aroused and is back to baseline.

## 2025-02-20 NOTE — PROGRESS NOTES
[facility]  Nephrology Progress Note    Patient's Name: Michelle Nettles    History of Present Illness from 2/15 consult note:    \"Michelle Nettles is a 39 y.o. female with a past medical history of ESRD, diabetes mellitus, and hypertension.  She presented to the [place] ED on 2/14 reportedly for concerns of syncope/fall.  Vital signs on 2/14 includes temperature 98.2, respirations 24, pulse 102, /73, and she was 99% SpO2.  Lab data on 2/14 includes potassium 3.1, CO2 31, BUN 28, creatinine 7.6, glucose 264, and hemoglobin 7.6.  She had a CT head on 2/14 which showed no evidence of an acute or subacute intracranial normality.  XR left ankle 2/14 showed acute trimalleolar fracture of the left ankle.  Chest x-ray from 2/14 showed no acute cardiopulmonary disease.  She reportedly transferred to [facility] on 2/15 and was seen by orthopedic surgery.  She underwent application of multiplanar external fixator LLE, closed treatment left ankle fracture/ dislocation 2/15.  Nephrology has been consulted to see the patient for ESRD on HD.  Patient is known to our service and dialyzes as an outpatient at [facility] via left arm AV fistula.  At present, patient was seen and examined.  She notes that she was standing up coughing and woke up on the floor.  She denies any dizziness.  She denies any chest pain.  She denies any nausea, vomiting, or diarrhea.  Her appetite has been good.\"    Subjective:    2/20/2025: Patient was seen and examined.  She reports that she feels okay and notes that her appetite is so-so.  She had HD yesterday with 2.5 L net removed    PMH:    Past Medical History:   Diagnosis Date    JOLLY (acute kidney injury) 4/5/2016    AVF (arteriovenous fistula) 02/19/2018    let arm    Chronic kidney disease     Dialysis AV fistula malfunction, initial encounter 11/7/2019    DM type 1 (diabetes mellitus, type 1) ([facility])     Encounter regarding vascular access for

## 2025-02-20 NOTE — PROGRESS NOTES
[facility] Hospitalist Progress Note    Admitting Date and Time: 2/14/2025 12:45 PM  Admit Dx: Syncope and collapse [R55]  Oxygen dependent [Z99.81]  ESRD (end stage renal disease) ([facility]) [N18.6]  Closed trimalleolar fracture of left ankle, initial encounter [S82.852A]  Anemia due to chronic kidney disease, on chronic dialysis ([facility]) [N18.6, D63.1, Z99.2]  Trimalleolar fracture of ankle, closed, left, initial encounter [S82.852A]    Synopsis: Patient is a 39-year-old female with past medical history of ESRD on hemodialysis, diabetes type 1, hypertension, tobacco use.  She presented to [place] ED for left ankle pain after syncopal episode.  She was found to have a left acute displaced trimalleolar fracture.  She underwent external fixation with application on 2/15.  Orthopedic surgery and nephrology are following.    Subjective:  Doing okay overall, pain somewhat controlled  Working with PT this morning       ROS: denies fever, chills, cp, sob, n/v, HA unless stated above.      insulin glargine  10 Units SubCUTAneous QHS    insulin lispro  4 Units SubCUTAneous TID AC    insulin lispro  0-6 Units SubCUTAneous 4x Daily AC & HS    lidocaine-EPINEPHrine  20 mL Other Once    [Held by provider] calcium acetate  4 capsule Oral TID WC    carvedilol  25 mg Oral QAM    docusate sodium  100 mg Oral Daily    ferrous sulfate  325 mg Oral Every Other Day    fluticasone  2 spray Each Nostril Daily    multivitamin  1 tablet Oral Daily    pantoprazole  40 mg Oral BID AC    sucralfate  1 g Oral 4x Daily AC & HS    vitamin B-12  500 mcg Oral Daily    melatonin  5 mg Oral QPM    clopidogrel  75 mg Oral Daily    acetaminophen  650 mg Oral 4 times per day     sodium chloride, , PRN  melatonin, 5 mg, Nightly PRN  hydrALAZINE, 10 mg, Q6H PRN  calcium carbonate, 500 mg, TID PRN  Polyvinyl Alcohol-Povidone PF, 1 drop, PRN  sodium chloride, 1 spray, PRN  benzonatate, 100 mg, TID PRN  benzocaine-menthol, 1 lozenge, Q2H PRN  glucose, 4

## 2025-02-20 NOTE — CARE COORDINATION
CM update note.  [facility] accepted and patient is agreeable to short term rehab.  [facility] to start precert today.  Thanh/destination/7000 completed.  Ambulance form with envelope place on the soft chart.  Patient attends dialysis MWF at [facility] on [address] and normally transports herself. Ortho, nephrology, and endocrine consulted. S/p left ankle external fixator placement on 2/15.  CM/SW to follow.  Haider Oshea RN [phone].      1300:  Precert obtained for [facility].  Precert is good through Saturday 2/22.  Patient had episode of low blood sugar this afternoon.  Endocrine adjusting insulin today.  CM/SW to follow.  Haider Oshea RN [phone].

## 2025-02-20 NOTE — PROGRESS NOTES
clinical assessment     Frequency/Duration 1-5 days/wk for 2 weeks PRN   Specific OT Treatment Interventions to include:   * Instruction/training on adapted ADL techniques and AE recommendations to increase functional independence within precautions       * Training on energy conservation strategies, correct breathing pattern and techniques to improve independence/tolerance for self-care routine  * Functional transfer/mobility training/DME recommendations for increased independence, safety, and fall prevention  * Patient/Family education to increase follow through with safety techniques and functional independence  * Recommendation of environmental modifications for increased safety with functional transfers/mobility and ADLs  * Therapeutic exercise to improve motor endurance, ROM, and functional strength for ADLs/functional transfers  * Therapeutic activities to facilitate/challenge dynamic balance, stand tolerance for increased safety and independence with ADLs     Recommended Adaptive Equipment: w/w     Home Living: Pt lives with 11 year old daughter (hx CP with physical needs) in a 1 story home with level entry    Bathroom setup: tub/shower combo    Equipment owned: w/c     Prior Level of Function: mod I with ADLs , mod I with IADLs; ambulated without AD and utilized w/c prn for mobility   Occupation: none stated     Pain Level: Pt reports moderate LLE pain this session; Therapist facilitated repositioning to address pain  n     Cognition: A&O: 4/4; Follows 2 step directions             Memory:  good             Sequencing:  good             Problem solving:  good             Judgement/safety:  fair+        Functional Assessment:  AM-PAC Daily Activity Raw Score: 16/24    Initial Eval Status  Date: 2/18/2025   Treatment Status  Date:2/20/25 STGs = LTGs  Time frame: 10-14 days   Feeding Independent   SET UP  Bed level     Grooming Stand by Assist      Seated at EOB  SBA  Seated at EOB  [facility]    UB  t [id number] 12 1   Neuro Re-ed [id number]     Group Therapy      Orthotic manage/training  [id number]     Non-Billable Time     Total Timed Treatment 40 3       Koby CARCAMO/RAD [id number]

## 2025-02-20 NOTE — DISCHARGE INSTR - COC
Continuity of Care Form    Patient Name: Michelle Nettles   :  1985  MRN:  [id number]    Admit date:  2025  Discharge date:      Code Status Order: Full Code   Advance Directives:   Advance Care Flowsheet Documentation             Admitting Physician:  Trenton Peters MD  PCP: Rogers Rodríguez MD    Discharging Nurse: DIVYA RN  Discharging Hospital Unit/Room#: 8405/8405-B  Discharging Unit Phone Number: [phone]    Emergency Contact:   Extended Emergency Contact Information  Primary Emergency Contact: Deana [facility]  Home Phone: [phone]  Mobile Phone: [phone]  Relation: Brother/Sister  Preferred language: English   needed? No  Secondary Emergency Contact: Nicole Musa   [facility]  Home Phone: [phone]  Relation: Other    Past Surgical History:  Past Surgical History:   Procedure Laterality Date     SECTION      COLONOSCOPY N/A 2024    COLONOSCOPY DIAGNOSTIC [place] Balaji [place] [facility]    CYST INCISION AND DRAINAGE  2011    perirectal abscess. [facility]. Dr. Fleming    DIALYSIS FISTULA CREATION Left 2019    LEFT ARM FISTULAGRAM, BALLOON ANGIOPLASTY performed by Haylee Zaidi MD at [facility] OR    DILATION AND CURETTAGE OF UTERUS      FRACTURE SURGERY      fracture right ulnar, left tibia, and pelvis    INVASIVE VASCULAR N/A 2023    Fistulogram left performed by Haylee Zaidi MD [place] [facility]    INVASIVE VASCULAR Left 2023    Angioplasty fistula/dialysis circuit performed by Haylee Zaidi MD [place] [facility]    LEG SURGERY Left 2/15/2025    Application of left ankle spanning external fixator performed by Peyman Germain MD [place] [facility]    OTHER SURGICAL HISTORY      open reduction internal fixation left radial shaft    OTHER SURGICAL HISTORY  2016    pericardial window    OTHER SURGICAL HISTORY  2016     r

## 2025-02-20 NOTE — TELEPHONE ENCOUNTER
[employer]  unable to get in touch with patient for training on insulin pump after multiple attempts.

## 2025-02-20 NOTE — PROGRESS NOTES
Physical Therapy  Treatment Note      Name: Michelle Nettles  : 1985  MRN: [id number]      Date of Service: 2025    Evaluating PT:  Chris Davis PT, DPT  [id number]    Room #:  8405/8405-B  Diagnosis:  Syncope and collapse [R55]  Oxygen dependent [Z99.81]  ESRD (end stage renal disease) ([facility]) [N18.6]  Closed trimalleolar fracture of left ankle, initial encounter [S82.852A]  Anemia due to chronic kidney disease, on chronic dialysis ([facility]) [N18.6, D63.1, Z99.2]  Trimalleolar fracture of ankle, closed, left, initial encounter [S82.852A]  PMHx/PSHx:   has a past medical history of JOLLY (acute kidney injury), AVF (arteriovenous fistula), Chronic kidney disease, Dialysis AV fistula malfunction, initial encounter, DM type 1 (diabetes mellitus, type 1) ([facility]), Encounter regarding vascular access for dialysis for ESRD ([facility]), ESRD (end stage renal disease) ([facility]), Hemodialysis patient, Hypertension, and Tobacco abuse.  Procedure/Surgery: Application of left ankle spanning external fixator (2/15/25)  Precautions:  Falls, NWB LLE  Equipment Needs:    18\" manual w/c with elevating leg rests, desk length arm rests, foam cushion  WW      SUBJECTIVE:    Pt lives with daughter (10 yo with physical needs) in a single story home with no stairs to enter.  Pt ambulated independently PTA.  Equipment Owned:     OBJECTIVE:   Initial Evaluation  Date: 25 Treatment Date: 25 Short Term/ Long Term   Goals   AM-PAC 6 Clicks     Was pt agreeable to Eval/treatment? Yes Yes    Does pt have pain? Moderate pain LLE No c/o pain    Bed Mobility  Rolling: Dariela  Supine to sit: Dariela  Sit to supine: NT  Scooting: Dariela Rolling: NT  Supine to sit: NT  Sit to supine: MaxA  Scooting: NT Rolling: Independent  Supine to sit: Independent  Sit to supine: Independent  Scooting: Independent   Transfers Sit to stand: Dariela  Stand to sit: Dariela  Stand pivot: Dariela WW NT Sit to stand: Independent  Stand to sit: Independent  Stand pivot:

## 2025-02-20 NOTE — PLAN OF CARE
Problem: Chronic Conditions and Co-morbidities  Goal: Patient's chronic conditions and co-morbidity symptoms are monitored and maintained or improved  Outcome: Progressing     Problem: Discharge Planning  Goal: Discharge to home or other facility with appropriate resources  Outcome: Progressing     Problem: Pain  Goal: Verbalizes/displays adequate comfort level or baseline comfort level  Outcome: Progressing     Problem: Skin/Tissue Integrity  Goal: Skin integrity remains intact  Description: 1.  Monitor for areas of redness and/or skin breakdown  2.  Assess vascular access sites hourly  3.  Every 4-6 hours minimum:  Change oxygen saturation probe site  4.  Every 4-6 hours:  If on nasal continuous positive airway pressure, respiratory therapy assess nares and determine need for appliance change or resting period  Outcome: Progressing     Problem: Safety - Adult  Goal: Free from fall injury  Outcome: Progressing     Problem: ABCDS Injury Assessment  Goal: Absence of physical injury  Outcome: Progressing     Problem: Nutrition Deficit:  Goal: Optimize nutritional status  Outcome: Progressing

## 2025-02-21 VITALS
WEIGHT: 157.63 LBS | SYSTOLIC BLOOD PRESSURE: 123 MMHG | TEMPERATURE: 95 F | OXYGEN SATURATION: 100 % | HEART RATE: 100 BPM | DIASTOLIC BLOOD PRESSURE: 65 MMHG | RESPIRATION RATE: 16 BRPM | HEIGHT: 59 IN | BODY MASS INDEX: 31.78 KG/M2

## 2025-02-21 DIAGNOSIS — S82.852A TRIMALLEOLAR FRACTURE OF ANKLE, CLOSED, LEFT, INITIAL ENCOUNTER: Primary | ICD-10-CM

## 2025-02-21 LAB
ALBUMIN SERPL-MCNC: 3.7 G/DL (ref 3.5–5.2)
ALP SERPL-CCNC: 113 U/L (ref 35–104)
ALT SERPL-CCNC: <5 U/L (ref 0–32)
ANION GAP SERPL CALCULATED.3IONS-SCNC: 16 MMOL/L (ref 7–16)
AST SERPL-CCNC: 16 U/L (ref 0–31)
BILIRUB SERPL-MCNC: 0.2 MG/DL (ref 0–1.2)
BUN SERPL-MCNC: 43 MG/DL (ref 6–20)
CALCIUM SERPL-MCNC: 9.4 MG/DL (ref 8.6–10.2)
CHLORIDE SERPL-SCNC: 93 MMOL/L (ref 98–107)
CO2 SERPL-SCNC: 27 MMOL/L (ref 22–29)
CREAT SERPL-MCNC: 8.1 MG/DL (ref 0.5–1)
ERYTHROCYTE [DISTWIDTH] IN BLOOD BY AUTOMATED COUNT: 15.9 % (ref 11.5–15)
GFR, ESTIMATED: 6 ML/MIN/1.73M2
GLUCOSE BLD-MCNC: 139 MG/DL (ref 74–99)
GLUCOSE BLD-MCNC: 309 MG/DL (ref 74–99)
GLUCOSE BLD-MCNC: 367 MG/DL (ref 74–99)
GLUCOSE BLD-MCNC: >500 MG/DL (ref 74–99)
GLUCOSE BLD-MCNC: >500 MG/DL (ref 74–99)
GLUCOSE SERPL-MCNC: 312 MG/DL (ref 74–99)
GLUCOSE SERPL-MCNC: 586 MG/DL (ref 74–99)
HCT VFR BLD AUTO: 25.1 % (ref 34–48)
HGB BLD-MCNC: 8.2 G/DL (ref 11.5–15.5)
MAGNESIUM SERPL-MCNC: 2.4 MG/DL (ref 1.6–2.6)
MCH RBC QN AUTO: 29.5 PG (ref 26–35)
MCHC RBC AUTO-ENTMCNC: 32.7 G/DL (ref 32–34.5)
MCV RBC AUTO: 90.3 FL (ref 80–99.9)
PHOSPHATE SERPL-MCNC: 4.2 MG/DL (ref 2.5–4.5)
PLATELET # BLD AUTO: 346 K/UL (ref 130–450)
PMV BLD AUTO: 9.7 FL (ref 7–12)
POTASSIUM SERPL-SCNC: 4.4 MMOL/L (ref 3.5–5)
PROT SERPL-MCNC: 7.8 G/DL (ref 6.4–8.3)
RBC # BLD AUTO: 2.78 M/UL (ref 3.5–5.5)
SODIUM SERPL-SCNC: 136 MMOL/L (ref 132–146)
WBC OTHER # BLD: 5.8 K/UL (ref 4.5–11.5)

## 2025-02-21 PROCEDURE — 1200000000 HC SEMI PRIVATE

## 2025-02-21 PROCEDURE — 36415 COLL VENOUS BLD VENIPUNCTURE: CPT

## 2025-02-21 PROCEDURE — 2500000003 HC RX 250 WO HCPCS

## 2025-02-21 PROCEDURE — 85027 COMPLETE CBC AUTOMATED: CPT

## 2025-02-21 PROCEDURE — 99239 HOSP IP/OBS DSCHRG MGMT >30: CPT | Performed by: INTERNAL MEDICINE

## 2025-02-21 PROCEDURE — 84100 ASSAY OF PHOSPHORUS: CPT

## 2025-02-21 PROCEDURE — 82962 GLUCOSE BLOOD TEST: CPT

## 2025-02-21 PROCEDURE — 6370000000 HC RX 637 (ALT 250 FOR IP): Performed by: NURSE PRACTITIONER

## 2025-02-21 PROCEDURE — 90935 HEMODIALYSIS ONE EVALUATION: CPT

## 2025-02-21 PROCEDURE — 99232 SBSQ HOSP IP/OBS MODERATE 35: CPT | Performed by: INTERNAL MEDICINE

## 2025-02-21 PROCEDURE — 82947 ASSAY GLUCOSE BLOOD QUANT: CPT

## 2025-02-21 PROCEDURE — 6360000002 HC RX W HCPCS

## 2025-02-21 PROCEDURE — 6370000000 HC RX 637 (ALT 250 FOR IP): Performed by: INTERNAL MEDICINE

## 2025-02-21 PROCEDURE — 83735 ASSAY OF MAGNESIUM: CPT

## 2025-02-21 PROCEDURE — 6370000000 HC RX 637 (ALT 250 FOR IP)

## 2025-02-21 PROCEDURE — 80053 COMPREHEN METABOLIC PANEL: CPT

## 2025-02-21 RX ORDER — INSULIN LISPRO 100 [IU]/ML
3 INJECTION, SOLUTION INTRAVENOUS; SUBCUTANEOUS
DISCHARGE
Start: 2025-02-21

## 2025-02-21 RX ORDER — INSULIN LISPRO 100 [IU]/ML
6 INJECTION, SOLUTION INTRAVENOUS; SUBCUTANEOUS ONCE
Status: COMPLETED | OUTPATIENT
Start: 2025-02-21 | End: 2025-02-21

## 2025-02-21 RX ORDER — OXYCODONE AND ACETAMINOPHEN 5; 325 MG/1; MG/1
1 TABLET ORAL EVERY 6 HOURS PRN
Qty: 28 TABLET | Refills: 0 | Status: SHIPPED | OUTPATIENT
Start: 2025-02-21 | End: 2025-02-28

## 2025-02-21 RX ORDER — INSULIN GLARGINE 100 [IU]/ML
10 INJECTION, SOLUTION SUBCUTANEOUS NIGHTLY
DISCHARGE
Start: 2025-02-21

## 2025-02-21 RX ORDER — INSULIN LISPRO 100 [IU]/ML
3 INJECTION, SOLUTION INTRAVENOUS; SUBCUTANEOUS
Status: DISCONTINUED | OUTPATIENT
Start: 2025-02-21 | End: 2025-02-22 | Stop reason: HOSPADM

## 2025-02-21 RX ADMIN — ACETAMINOPHEN 650 MG: 325 TABLET ORAL at 06:26

## 2025-02-21 RX ADMIN — HYDROMORPHONE HYDROCHLORIDE 0.2 MG: 1 INJECTION, SOLUTION INTRAMUSCULAR; INTRAVENOUS; SUBCUTANEOUS at 18:55

## 2025-02-21 RX ADMIN — FERROUS SULFATE TAB 325 MG (65 MG ELEMENTAL FE) 325 MG: 325 (65 FE) TAB at 11:53

## 2025-02-21 RX ADMIN — INSULIN GLARGINE 10 UNITS: 100 INJECTION, SOLUTION SUBCUTANEOUS at 21:04

## 2025-02-21 RX ADMIN — PANTOPRAZOLE SODIUM 40 MG: 40 TABLET, DELAYED RELEASE ORAL at 06:26

## 2025-02-21 RX ADMIN — INSULIN LISPRO 3 UNITS: 100 INJECTION, SOLUTION INTRAVENOUS; SUBCUTANEOUS at 12:50

## 2025-02-21 RX ADMIN — ACETAMINOPHEN 650 MG: 325 TABLET ORAL at 11:53

## 2025-02-21 RX ADMIN — HYDROMORPHONE HYDROCHLORIDE 0.2 MG: 1 INJECTION, SOLUTION INTRAMUSCULAR; INTRAVENOUS; SUBCUTANEOUS at 22:40

## 2025-02-21 RX ADMIN — SUCRALFATE 1 G: 1 TABLET ORAL at 06:26

## 2025-02-21 RX ADMIN — Medication 5 MG: at 20:54

## 2025-02-21 RX ADMIN — HYDROMORPHONE HYDROCHLORIDE 0.2 MG: 1 INJECTION, SOLUTION INTRAMUSCULAR; INTRAVENOUS; SUBCUTANEOUS at 06:24

## 2025-02-21 RX ADMIN — PANTOPRAZOLE SODIUM 40 MG: 40 TABLET, DELAYED RELEASE ORAL at 17:05

## 2025-02-21 RX ADMIN — CYANOCOBALAMIN TAB 1000 MCG 500 MCG: 1000 TAB at 11:53

## 2025-02-21 RX ADMIN — INSULIN LISPRO 6 UNITS: 100 INJECTION, SOLUTION INTRAVENOUS; SUBCUTANEOUS at 17:57

## 2025-02-21 RX ADMIN — SUCRALFATE 1 G: 1 TABLET ORAL at 17:05

## 2025-02-21 RX ADMIN — HYDROMORPHONE HYDROCHLORIDE 0.2 MG: 1 INJECTION, SOLUTION INTRAMUSCULAR; INTRAVENOUS; SUBCUTANEOUS at 11:54

## 2025-02-21 RX ADMIN — MULTIVITAMIN TABLET 1 TABLET: TABLET at 11:53

## 2025-02-21 RX ADMIN — CARVEDILOL 25 MG: 25 TABLET, FILM COATED ORAL at 09:25

## 2025-02-21 RX ADMIN — OXYCODONE 5 MG: 5 TABLET ORAL at 15:44

## 2025-02-21 RX ADMIN — HYDROMORPHONE HYDROCHLORIDE 0.2 MG: 1 INJECTION, SOLUTION INTRAMUSCULAR; INTRAVENOUS; SUBCUTANEOUS at 01:37

## 2025-02-21 RX ADMIN — SUCRALFATE 1 G: 1 TABLET ORAL at 11:53

## 2025-02-21 RX ADMIN — INSULIN LISPRO 3 UNITS: 100 INJECTION, SOLUTION INTRAVENOUS; SUBCUTANEOUS at 17:05

## 2025-02-21 RX ADMIN — SUCRALFATE 1 G: 1 TABLET ORAL at 20:55

## 2025-02-21 RX ADMIN — CLOPIDOGREL BISULFATE 75 MG: 75 TABLET ORAL at 11:53

## 2025-02-21 RX ADMIN — OXYCODONE 5 MG: 5 TABLET ORAL at 10:46

## 2025-02-21 RX ADMIN — OXYCODONE 5 MG: 5 TABLET ORAL at 20:55

## 2025-02-21 RX ADMIN — ACETAMINOPHEN 650 MG: 325 TABLET ORAL at 17:05

## 2025-02-21 RX ADMIN — ACETAMINOPHEN 650 MG: 325 TABLET ORAL at 23:13

## 2025-02-21 RX ADMIN — DOCUSATE SODIUM 100 MG: 100 CAPSULE, LIQUID FILLED ORAL at 09:26

## 2025-02-21 ASSESSMENT — PAIN SCALES - GENERAL
PAINLEVEL_OUTOF10: 7
PAINLEVEL_OUTOF10: 7
PAINLEVEL_OUTOF10: 10
PAINLEVEL_OUTOF10: 9
PAINLEVEL_OUTOF10: 10
PAINLEVEL_OUTOF10: 8
PAINLEVEL_OUTOF10: 10
PAINLEVEL_OUTOF10: 6

## 2025-02-21 ASSESSMENT — PAIN DESCRIPTION - DESCRIPTORS
DESCRIPTORS: ACHING;SHARP;SORE
DESCRIPTORS: SHARP;THROBBING
DESCRIPTORS: ACHING;DISCOMFORT;SORE
DESCRIPTORS: DISCOMFORT;THROBBING;DULL
DESCRIPTORS: ACHING;DISCOMFORT;THROBBING

## 2025-02-21 ASSESSMENT — PAIN DESCRIPTION - ORIENTATION
ORIENTATION: LEFT

## 2025-02-21 ASSESSMENT — PAIN DESCRIPTION - LOCATION
LOCATION: ANKLE
LOCATION: ANKLE
LOCATION: LEG;FOOT
LOCATION: LEG
LOCATION: LEG
LOCATION: FOOT;LEG

## 2025-02-21 ASSESSMENT — PAIN SCALES - WONG BAKER
WONGBAKER_NUMERICALRESPONSE: 2;0
WONGBAKER_NUMERICALRESPONSE: 2;0

## 2025-02-21 NOTE — PROGRESS NOTES
ENDOCRINOLOGY PROGRESS NOTE      Date of admission: 2/14/2025  Date of service: 2/21/2025  Admitting physician: Trenton Peters MD   Primary Care Physician: Rogers Rodríguez MD  Consultant physician: Brennan Dyer MD     Reason for the consultation:  Uncontrolled DM    History of Present Illness:  The history is provided by the patient. Accuracy of the patient data is excellent    Michelle Nettles is a very pleasant 39 y.o. old female with PMH of poorly controlled diabetes, end-stage renal disease on hemodialysis, hypertension and other listed below admitted to [facility] on 2/14/2025 following a fall and found to have Left ankle trimalleolar fracture , endocrine service was consulted for diabetes management.     Subjective   Patient was seen this morning, no acute events overnight, glucose this morning high.  No hypoglycemia  Inpatient diet:   Carb Restricted diet     Point of care glucose monitoring   (Independently reviewed)   Recent Labs     02/20/25  0511 02/20/25  1126 02/20/25  1422 02/20/25  1431 02/20/25  1444 02/20/25  1701 02/20/25  2049 02/21/25  0629   POCGLU 306* 185* 44* 59* 133* 183* 262* 367*     Scheduled Meds:   insulin lispro  3 Units SubCUTAneous TID AC    insulin glargine  10 Units SubCUTAneous QHS    insulin lispro  0-6 Units SubCUTAneous 4x Daily AC & HS    lidocaine-EPINEPHrine  20 mL Other Once    [Held by provider] calcium acetate  4 capsule Oral TID WC    carvedilol  25 mg Oral QAM    docusate sodium  100 mg Oral Daily    ferrous sulfate  325 mg Oral Every Other Day    fluticasone  2 spray Each Nostril Daily    multivitamin  1 tablet Oral Daily    pantoprazole  40 mg Oral BID AC    sucralfate  1 g Oral 4x Daily AC & HS    vitamin B-12  500 mcg Oral Daily    melatonin  5 mg Oral QPM    clopidogrel  75 mg Oral Daily    acetaminophen  650 mg Oral 4 times per day       PRN Meds:   sodium chloride, , PRN  melatonin, 5 mg, Nightly PRN  hydrALAZINE, 10 mg, Q6H PRN  calcium carbonate, 500 mg,

## 2025-02-21 NOTE — DISCHARGE SUMMARY
[facility] Hospitalist Physician Discharge Summary       Peyman Germain MD  [address] [facility] [address]  [phone]    Follow up in 1 week(s)      [place] [address] [address] [phone]          Activity level: As tolerated     Dispo: Rehab    Condition on discharge: Stable     Patient ID:  Michelle Nettles  [id number]  39 y.o.  1985    Admit date: 2/14/2025    Discharge date and time:  2/21/2025  12:55 PM    Admission Diagnoses: Principal Problem:    Closed trimalleolar fracture of left ankle  Active Problems:    ESRD (end stage renal disease) ([facility])    ESRD needing dialysis ([facility])    Poorly controlled type 2 diabetes mellitus ([facility])    Syncope and collapse    Oxygen dependent    Trimalleolar fracture of ankle, closed, left, initial encounter  Resolved Problems:    * No resolved hospital problems. *      Discharge Diagnoses: Principal Problem:    Closed trimalleolar fracture of left ankle  Active Problems:    ESRD (end stage renal disease) ([facility])    ESRD needing dialysis (HCC)    Poorly controlled type 2 diabetes mellitus (HCC)    Syncope and collapse    Oxygen dependent    Trimalleolar fracture of ankle, closed, left, initial encounter  Resolved Problems:    * No resolved hospital problems. *      Consults:  IP CONSULT TO ORTHOPEDIC SURGERY  IP CONSULT TO HOSPITALIST  IP CONSULT TO NEPHROLOGY  IP CONSULT TO DIETITIAN  IP CONSULT TO ENDOCRINOLOGY    Hospital Course:   Patient Michelle Nettles is a 39 y.o. presented with Syncope and collapse [R55]  Oxygen dependent [Z99.81]  ESRD (end stage renal disease) ([facility]) [N18.6]  Closed trimalleolar fracture of left ankle, initial encounter [S82.852A]  Anemia due to chronic kidney disease, on chronic dialysis (HCC) [N18.6, D63.1, Z99.2]  Trimalleolar fracture of ankle, closed, left, initial encounter [S82.852A]  Patient is a 39-year-old female with past medical history of ESRD on

## 2025-02-21 NOTE — PLAN OF CARE
Problem: Chronic Conditions and Co-morbidities  Goal: Patient's chronic conditions and co-morbidity symptoms are monitored and maintained or improved  2/20/2025 1638 by Lashell Randall RN  Outcome: Progressing     Problem: Discharge Planning  Goal: Discharge to home or other facility with appropriate resources  2/20/2025 1638 by Lashell Randall RN  Outcome: Progressing     Problem: Pain  Goal: Verbalizes/displays adequate comfort level or baseline comfort level  2/20/2025 1638 by Lashell Randall RN  Outcome: Progressing     Problem: Skin/Tissue Integrity  Goal: Skin integrity remains intact  Description: 1.  Monitor for areas of redness and/or skin breakdown  2.  Assess vascular access sites hourly  3.  Every 4-6 hours minimum:  Change oxygen saturation probe site  4.  Every 4-6 hours:  If on nasal continuous positive airway pressure, respiratory therapy assess nares and determine need for appliance change or resting period  2/20/2025 1638 by Lashell Randall RN  Outcome: Progressing     Problem: Safety - Adult  Goal: Free from fall injury  2/20/2025 1638 by Lashell Randall RN  Outcome: Progressing     Problem: ABCDS Injury Assessment  Goal: Absence of physical injury  2/20/2025 1638 by Lashell Randall RN  Outcome: Progressing     Problem: Nutrition Deficit:  Goal: Optimize nutritional status  2/20/2025 1638 by Lashell Randall RN  Outcome: Progressing

## 2025-02-21 NOTE — CONSENT
Informed Consent for Blood Component Transfusion Note    I have discussed with the patient the rationale for blood component transfusion; its benefits in treating or preventing fatigue, organ damage, or death; and its risk which includes mild transfusion reactions, rare risk of blood borne infection, or more serious but rare reactions. I have discussed the alternatives to transfusion, including the risk and consequences of not receiving transfusion. The patient had an opportunity to ask questions and had agreed to proceed with transfusion of blood components.    Electronically signed by Cassie Fleming MD on 2/21/25 at 12:55 PM EST

## 2025-02-21 NOTE — CARE COORDINATION
CM update note.  Discharge plan is [facility].  Precert has been obtained and is good through 2/22.  Perfect serve message sent to attending to check for discharge.  Thanh/destination/7000 completed.  Ambulette form with envelope placed on the soft chart.  Patient attends dialysis MWF at [facility] on [address] and normally transports herself.  Nephrology, and endocrine following.  S/p left ankle external fixator placement on 2/15.  CM/SW to follow.  Haider Oshea RN  [phone].      1310:  discharge order noted.  Per charge RN endocrine has cleared for discharge.  Ortho needs to sign paper Rx for facility.      1446:  patient cleared for discharge.  Transport set up via wheelchair with PAS.   time is [id number].  Nurse will need to call report to [phone].  Facility liaison, patient and RN notified of  time.  Haider Oshea RN [phone].

## 2025-02-21 NOTE — PLAN OF CARE
Problem: Chronic Conditions and Co-morbidities  Goal: Patient's chronic conditions and co-morbidity symptoms are monitored and maintained or improved  2/21/2025 1458 by Lashell Randall RN  Outcome: Adequate for Discharge     Problem: Discharge Planning  Goal: Discharge to home or other facility with appropriate resources  2/21/2025 1458 by Lashell Randall RN  Outcome: Adequate for Discharge     Problem: Pain  Goal: Verbalizes/displays adequate comfort level or baseline comfort level  2/21/2025 1458 by Lashell Randall RN  Outcome: Adequate for Discharge     Problem: Skin/Tissue Integrity  Goal: Skin integrity remains intact  Description: 1.  Monitor for areas of redness and/or skin breakdown  2.  Assess vascular access sites hourly  3.  Every 4-6 hours minimum:  Change oxygen saturation probe site  4.  Every 4-6 hours:  If on nasal continuous positive airway pressure, respiratory therapy assess nares and determine need for appliance change or resting period  2/21/2025 1458 by Lashell Randall RN  Outcome: Adequate for Discharge     Problem: Safety - Adult  Goal: Free from fall injury  2/21/2025 1458 by Lashell Randall RN  Outcome: Adequate for Discharge     Problem: ABCDS Injury Assessment  Goal: Absence of physical injury  2/21/2025 1458 by Lashell Randall RN  Outcome: Adequate for Discharge     Problem: Nutrition Deficit:  Goal: Optimize nutritional status  2/21/2025 1458 by Lashell Randall RN  Outcome: Adequate for Discharge

## 2025-02-21 NOTE — FLOWSHEET NOTE
02/21/25 1110   Vital Signs   /60   Temp 97.5 °F (36.4 °C)   Pulse 96   Respirations 18   Weight - Scale   (floor to weigh)   Post-Hemodialysis Assessment   Post-Treatment Procedures Blood returned;Access bleeding time < 10 minutes   Machine Disinfection Process Exterior Machine Disinfection   Rinseback Volume (ml) 300 ml   Blood Volume Processed (Liters) 85.8 L   Dialyzer Clearance Lightly streaked   Duration of Treatment (minutes) 240 minutes   Heparin Amount Administered During Treatment (mL) 0 mL   Hemodialysis Intake (ml) 300 ml   Hemodialysis Output (ml) 3300 ml   NET Removed (ml) 3000   Tolerated Treatment Good   Patient Response to Treatment tolerated well   Bilateral Breath Sounds Diminished   RLE Edema +1   LLE Edema +1   Facial Edema +2   Physician Notified No   Time Off 1106   Patient Disposition Return to room   Observations & Evaluations   Level of Consciousness 0   Oriented X 3

## 2025-02-21 NOTE — PROGRESS NOTES
ENDOCRINOLOGY PROGRESS NOTE      Date of admission: 2/14/2025  Date of service: 2/20/2025  Admitting physician: Trenton Peters MD   Primary Care Physician: Rogers Rodríguez MD  Consultant physician: Brennan Dyer MD     Reason for the consultation:  Uncontrolled DM    History of Present Illness:  The history is provided by the patient. Accuracy of the patient data is excellent    Michelle Nettles is a very pleasant 39 y.o. old female with PMH of poorly controlled diabetes, end-stage renal disease on hemodialysis, hypertension and other listed below admitted to [facility] on 2/14/2025 following a fall and found to have Left ankle trimalleolar fracture , endocrine service was consulted for diabetes management.     Subjective   The patient was seen and examined, low low BS this afternoon     Inpatient diet:   Carb Restricted diet     Point of care glucose monitoring   (Independently reviewed)   Recent Labs     02/19/25  2016 02/20/25  0511 02/20/25  1126 02/20/25  1422 02/20/25  1431 02/20/25  1444 02/20/25  1701 02/20/25  2049   POCGLU 247* 306* 185* 44* 59* 133* 183* 262*     Scheduled Meds:   insulin lispro  2 Units SubCUTAneous TID AC    insulin glargine  10 Units SubCUTAneous QHS    insulin lispro  0-6 Units SubCUTAneous 4x Daily AC & HS    lidocaine-EPINEPHrine  20 mL Other Once    [Held by provider] calcium acetate  4 capsule Oral TID WC    carvedilol  25 mg Oral QAM    docusate sodium  100 mg Oral Daily    ferrous sulfate  325 mg Oral Every Other Day    fluticasone  2 spray Each Nostril Daily    multivitamin  1 tablet Oral Daily    pantoprazole  40 mg Oral BID AC    sucralfate  1 g Oral 4x Daily AC & HS    vitamin B-12  500 mcg Oral Daily    melatonin  5 mg Oral QPM    clopidogrel  75 mg Oral Daily    acetaminophen  650 mg Oral 4 times per day       PRN Meds:   sodium chloride, , PRN  melatonin, 5 mg, Nightly PRN  hydrALAZINE, 10 mg, Q6H PRN  calcium carbonate, 500 mg, TID PRN  Polyvinyl Alcohol-Povidone PF,

## 2025-02-21 NOTE — PROGRESS NOTES
N2N report called to [facility]. Tele monitor removed and placed in storage

## 2025-02-21 NOTE — PLAN OF CARE
Problem: Chronic Conditions and Co-morbidities  Goal: Patient's chronic conditions and co-morbidity symptoms are monitored and maintained or improved  2/21/2025 0037 by Amarilis Ruvalcaba RN  Outcome: Progressing     Problem: Discharge Planning  Goal: Discharge to home or other facility with appropriate resources  2/21/2025 0037 by Amarilis Ruvalcaba RN  Outcome: Progressing     Problem: Pain  Goal: Verbalizes/displays adequate comfort level or baseline comfort level  2/21/2025 0037 by Amarilis Ruvalcaba RN  Outcome: Progressing     Problem: Skin/Tissue Integrity  Goal: Skin integrity remains intact  Description: 1.  Monitor for areas of redness and/or skin breakdown  2.  Assess vascular access sites hourly  3.  Every 4-6 hours minimum:  Change oxygen saturation probe site  4.  Every 4-6 hours:  If on nasal continuous positive airway pressure, respiratory therapy assess nares and determine need for appliance change or resting period  2/21/2025 0037 by Amarilis Ruvalcaba RN  Outcome: Progressing     Problem: Safety - Adult  Goal: Free from fall injury  2/21/2025 0037 by Amarilis Ruvalcaba RN  Outcome: Progressing     Problem: ABCDS Injury Assessment  Goal: Absence of physical injury  2/20/2025 1638 by Lashell Randall RN  Outcome: Progressing

## 2025-02-21 NOTE — PLAN OF CARE
Problem: Chronic Conditions and Co-morbidities  Goal: Patient's chronic conditions and co-morbidity symptoms are monitored and maintained or improved  2/21/2025 1140 by Lashell Randall RN  Outcome: Progressing     Problem: Discharge Planning  Goal: Discharge to home or other facility with appropriate resources  2/21/2025 1140 by Lashell Randall RN  Outcome: Progressing     Problem: Pain  Goal: Verbalizes/displays adequate comfort level or baseline comfort level  2/21/2025 1140 by Lashell Randall RN  Outcome: Progressing     Problem: Skin/Tissue Integrity  Goal: Skin integrity remains intact  Description: 1.  Monitor for areas of redness and/or skin breakdown  2.  Assess vascular access sites hourly  3.  Every 4-6 hours minimum:  Change oxygen saturation probe site  4.  Every 4-6 hours:  If on nasal continuous positive airway pressure, respiratory therapy assess nares and determine need for appliance change or resting period  2/21/2025 1140 by Lashell Randall RN  Outcome: Progressing  2/21/2025 0037 by Amarilis Ruvalcaba RN  Outcome: Progressing     Problem: Safety - Adult  Goal: Free from fall injury  2/21/2025 1140 by Lashell Randall RN  Outcome: Progressing     Problem: ABCDS Injury Assessment  Goal: Absence of physical injury  Outcome: Progressing     Problem: Nutrition Deficit:  Goal: Optimize nutritional status  2/21/2025 1140 by Lashell Randall RN  Outcome: Progressing

## 2025-02-21 NOTE — PROGRESS NOTES
CLINICAL PHARMACY NOTE: MEDS TO BEDS    Total # of Prescriptions Filled: 0   The following medications were delivered to the patient:  Profile - pt going to facility    Additional Documentation: profiled percocet and enoxaparin - patient going to facility per JENNIFER Monroe

## 2025-02-21 NOTE — PROGRESS NOTES
Spiritual Health History and Assessment/Progress Note  [facility]    (P) Initial Encounter, Spiritual/Emotional Needs,  ,  ,      Name: Michelle Nettles MRN: [id number]    Age: 39 y.o.     Sex: female   Language: English   Restorationist: Latter-day   Closed trimalleolar fracture of left ankle     Date: 2/21/2025                           Spiritual Assessment began in [facility]        Referral/Consult From: (P) Rounding   Encounter Overview/Reason: (P) Initial Encounter, Spiritual/Emotional Needs  Service Provided For: (P) Patient    Jillian, Belief, Meaning:   Patient has beliefs or practices that help with coping during difficult times  Family/Friends No family/friends present      Importance and Influence:  Patient has spiritual/personal beliefs that influence decisions regarding their health  Family/Friends No family/friends present    Community:  Patient feels well-supported. Support system includes: Extended family  Family/Friends No family/friends present    Assessment and Plan of Care:     Patient Interventions include: Facilitated expression of thoughts and feelings, Explored spiritual coping/struggle/distress, and Affirmed coping skills/support systems  Family/Friends Interventions include: No family/friends present    Patient Plan of Care: Spiritual Care available upon further referral  Family/Friends Plan of Care: No family/friends present    Electronically signed by Chaplain Sea on 2/21/2025 at 5:24 PM

## 2025-02-21 NOTE — PROGRESS NOTES
[facility]  Nephrology Progress Note    Patient's Name: Michelle Nettles    History of Present Illness from 2/15 consult note:    \"Michelle Nettles is a 39 y.o. female with a past medical history of ESRD, diabetes mellitus, and hypertension.  She presented to the [facility] ED on 2/14 reportedly for concerns of syncope/fall.  Vital signs on 2/14 includes temperature 98.2, respirations 24, pulse 102, /73, and she was 99% SpO2.  Lab data on 2/14 includes potassium 3.1, CO2 31, BUN 28, creatinine 7.6, glucose 264, and hemoglobin 7.6.  She had a CT head on 2/14 which showed no evidence of an acute or subacute intracranial normality.  XR left ankle 2/14 showed acute trimalleolar fracture of the left ankle.  Chest x-ray from 2/14 showed no acute cardiopulmonary disease.  She reportedly transferred to [facility] on 2/15 and was seen by orthopedic surgery.  She underwent application of multiplanar external fixator LLE, closed treatment left ankle fracture/ dislocation 2/15.  Nephrology has been consulted to see the patient for ESRD on HD.  Patient is known to our service and dialyzes as an outpatient at [facility] via left arm AV fistula.  At present, patient was seen and examined.  She notes that she was standing up coughing and woke up on the floor.  She denies any dizziness.  She denies any chest pain.  She denies any nausea, vomiting, or diarrhea.  Her appetite has been good.\"    Subjective:    2/21/2025: Patient was seen and examined.  She notes that she feels a lot better.  She denies any nausea or shortness of breath.  HD today.    PMH:    Past Medical History:   Diagnosis Date    JOLLY (acute kidney injury) 4/5/2016    AVF (arteriovenous fistula) 02/19/2018    let arm    Chronic kidney disease     Dialysis AV fistula malfunction, initial encounter 11/7/2019    DM type 1 (diabetes mellitus, type 1) ([facility], located within [facility])     Encounter regarding vascular access for dialysis for ESRD

## 2025-02-22 NOTE — PLAN OF CARE
Problem: Chronic Conditions and Co-morbidities  Goal: Patient's chronic conditions and co-morbidity symptoms are monitored and maintained or improved  2/21/2025 2008 by Amarilis Ruvalcaba RN  Outcome: Progressing     Problem: Discharge Planning  Goal: Discharge to home or other facility with appropriate resources  2/21/2025 2008 by Amarilis Ruvalcaba RN  Outcome: Progressing     Problem: Pain  Goal: Verbalizes/displays adequate comfort level or baseline comfort level  2/21/2025 2008 by Amarilis Ruvalcaba RN  Outcome: Progressing     Problem: Skin/Tissue Integrity  Goal: Skin integrity remains intact  Description: 1.  Monitor for areas of redness and/or skin breakdown  2.  Assess vascular access sites hourly  3.  Every 4-6 hours minimum:  Change oxygen saturation probe site  4.  Every 4-6 hours:  If on nasal continuous positive airway pressure, respiratory therapy assess nares and determine need for appliance change or resting period  2/21/2025 2008 by Amarilis Ruvalcaba RN  Outcome: Progressing     Problem: Safety - Adult  Goal: Free from fall injury  2/21/2025 2008 by Amarilis Ruvalcaba RN  Outcome: Progressing     Problem: ABCDS Injury Assessment  Goal: Absence of physical injury  2/21/2025 2008 by Amarilis Ruvalcaba RN  Outcome: Progressing

## 2025-02-24 ENCOUNTER — CARE COORDINATION (OUTPATIENT)
Dept: CARE COORDINATION | Age: 40
End: 2025-02-24

## 2025-02-24 NOTE — CARE COORDINATION
Patient has been transferred to [facility].  No discharge plan at this time.  [facility] will check with [facility] again to obtain discharge plan.  Will not discontinue RPM equipment until discharge plan is known.

## 2025-02-26 ENCOUNTER — TELEPHONE (OUTPATIENT)
Dept: ORTHOPEDIC SURGERY | Age: 40
End: 2025-02-26

## 2025-02-26 ENCOUNTER — PREP FOR PROCEDURE (OUTPATIENT)
Dept: ORTHOPEDIC SURGERY | Age: 40
End: 2025-02-26

## 2025-02-26 ENCOUNTER — OFFICE VISIT (OUTPATIENT)
Dept: ORTHOPEDIC SURGERY | Age: 40
End: 2025-02-26
Payer: MEDICARE

## 2025-02-26 ENCOUNTER — HOSPITAL ENCOUNTER (OUTPATIENT)
Dept: GENERAL RADIOLOGY | Age: 40
Discharge: HOME | End: 2025-02-28
Payer: MEDICARE

## 2025-02-26 VITALS
DIASTOLIC BLOOD PRESSURE: 57 MMHG | SYSTOLIC BLOOD PRESSURE: 105 MMHG | RESPIRATION RATE: 16 BRPM | HEART RATE: 103 BPM | OXYGEN SATURATION: 100 %

## 2025-02-26 DIAGNOSIS — Z97.10: ICD-10-CM

## 2025-02-26 DIAGNOSIS — S82.852A TRIMALLEOLAR FRACTURE OF ANKLE, CLOSED, LEFT, INITIAL ENCOUNTER: Primary | ICD-10-CM

## 2025-02-26 DIAGNOSIS — S82.852A TRIMALLEOLAR FRACTURE OF ANKLE, CLOSED, LEFT, INITIAL ENCOUNTER: ICD-10-CM

## 2025-02-26 PROCEDURE — 99214 OFFICE O/P EST MOD 30 MIN: CPT | Performed by: PHYSICIAN ASSISTANT

## 2025-02-26 PROCEDURE — 73610 X-RAY EXAM OF ANKLE: CPT

## 2025-02-26 PROCEDURE — 99024 POSTOP FOLLOW-UP VISIT: CPT | Performed by: PHYSICIAN ASSISTANT

## 2025-02-26 RX ORDER — BISACODYL 10 MG
10 SUPPOSITORY, RECTAL RECTAL DAILY PRN
COMMUNITY

## 2025-02-26 RX ORDER — ASPIRIN 81 MG/1
81 TABLET ORAL 2 TIMES DAILY
Qty: 60 TABLET | Refills: 2 | Status: SHIPPED
Start: 2025-02-26 | End: 2025-02-26

## 2025-02-26 RX ORDER — ASPIRIN 81 MG/1
81 TABLET ORAL 2 TIMES DAILY
Qty: 60 TABLET | Refills: 2 | Status: SHIPPED | OUTPATIENT
Start: 2025-02-26

## 2025-02-26 RX ORDER — DIPHENHYDRAMINE HCL 25 MG
25 TABLET ORAL EVERY 6 HOURS PRN
COMMUNITY

## 2025-02-26 NOTE — TELEPHONE ENCOUNTER
Patient Name:  Michelle Nettles  Patient :  1985        DIAGNOSIS & PROCEDURE:                       Procedure/Operation: Removal of External Fixator from LLE, Left Ankle Trimalleolar Fracture ORIF            Diagnosis:  Closed Left Ankle Trimalleolar Fracture, S/P External Fixator Application to LLE    Location:  [facility]    Surgeon:  Dr. Peyman Germain MD    SCHEDULING INFORMATION:                          Date: 3-6-2025    Time: 8:30 am              Anesthesia:  General                                              Status: Outpatient    Special Comments:  Patient resides at [facility]. Phone # [phone].        Vendor: [employer]  []   Notified     Electronically signed by Micki Pollock MA on 2025 at 6:08 PM

## 2025-02-26 NOTE — PATIENT INSTRUCTIONS
cast, clean the incision 2 times a day after your doctor allows you to remove the bandage. Use only soap and water to clean the incision unless your doctor gives you different instructions. Don't use hydrogen peroxide or alcohol, which can slow healing.   Exercise    Do exercises as instructed by your doctor or physical therapist. These exercises will help keep your muscles strong and your joints flexible while your bone is healing.     Wiggle your fingers or toes on the injured arm or leg often. This helps reduce swelling and stiffness.   Ice and elevation    Prop up the injured arm or leg on a pillow when you ice it or anytime you sit or lie down during the first 1 to 2 weeks after your surgery. Try to keep it above the level of your heart. This will help reduce swelling and pain.   Other instructions    If you have a cast or splint:  Keep it dry.  If you have a removable splint, ask your doctor if it is okay to take it off to bathe. Your doctor may want you to keep it on as much as possible. Be careful not to put the splint on too tight.  Do not stick objects such as pencils or coat hangers in your cast or splint to scratch your skin.  Do not put powder into your cast or splint to relieve itchy skin.  Never cut or alter your cast or splint.   Follow-up care is a key part of your treatment and safety. Be sure to make and go to all appointments, and call your doctor if you are having problems. It's also a good idea to know your test results and keep alist of the medicines you take.  When should you call for help?   Call 911 anytime you think you may need emergency care. For example, call if:    You passed out (lost consciousness).     You have severe trouble breathing.     You have sudden chest pain and shortness of breath, or you cough up blood.   Call your doctor now or seek immediate medical care if:    You have pain that does not get better after you take pain medicine.     Your fingers or toes on the injured

## 2025-02-26 NOTE — PROGRESS NOTES
Orthopaedic H&P    Michelle ORDONEZ Lencho Nettles is a 39 y.o. female, her YOB: 1985 with the following history as recorded in [facility]:      Patient Active Problem List    Diagnosis Date Noted    Chronic systolic (congestive) heart failure 11/30/2022    Presence of artificial lower extremity 02/26/2025    Trimalleolar fracture of ankle, closed, left, initial encounter 02/16/2025    Closed trimalleolar fracture of left ankle 02/15/2025    Syncope and collapse 02/15/2025    Oxygen dependent 02/15/2025    Acute superficial gastritis without hemorrhage 01/16/2025    Schatzki's ring of distal esophagus 01/16/2025    Anemia 01/10/2025    Hypervolemia 08/01/2024    Hypoglycemia 07/17/2024    GI bleed 06/06/2024    Poorly controlled type 2 diabetes mellitus (HCC) 03/22/2024    Hypercalcemia 03/22/2024    Acute on chronic anemia 01/30/2024    Chronic hypoxic respiratory failure, on home oxygen therapy (HCC) 01/08/2024    ESRD needing dialysis ([facility]) 07/11/2023    SOB (shortness of breath) 06/01/2023    ESRD on hemodialysis ([facility]) 05/04/2023    ESRD (end stage renal disease) ([facility]) 05/04/2023    MRSA colonization 03/23/2023    History of venous thromboembolism 03/18/2022    Hypotension 03/17/2022    Hypoxia 02/23/2022    Gall stone 01/27/2021    Dietary noncompliance 01/27/2021    Metabolic encephalopathy 01/27/2021    Acute decompensated heart failure (HCC) 07/21/2020    Normocytic anemia due to blood loss 07/14/2020    Symptomatic anemia 07/13/2020    Nasal congestion 03/17/2020    Hidradenitis suppurativa 03/17/2020    Vitamin B deficiency, unspecified 02/25/2020    Periorbital cellulitis     Periorbital cellulitis of right eye 11/04/2019    Nasal polyp 09/19/2019    Acute hyperkalemia     Facial cellulitis 09/28/2018    Poorly controlled type 1 diabetes mellitus (HCC) 02/21/2011     Current Outpatient Medications   Medication Sig Dispense Refill    tuberculin 5 UNIT/0.1ML injection Inject 0.1 mLs into the skin

## 2025-02-27 ENCOUNTER — TELEPHONE (OUTPATIENT)
Dept: SURGERY | Age: 40
End: 2025-02-27

## 2025-02-27 NOTE — TELEPHONE ENCOUNTER
Patient no showed today with Dr. Gonzalez at 1pm. MA contacted [facility] of the [facility] to reschedule the appointment. MA spoke with pt nurse whom apologized to MA stating that transportation never showed to pick pt up for appt. When asked about rescheduling, pt nurse informed MA that pt is pending discharge today, so either pt or facility would call back to reschedule.    Electronically signed by Jennifer Wing MA on 2/27/2025 at 1:45 PM

## 2025-02-28 RX ORDER — SODIUM CHLORIDE 0.9 % (FLUSH) 0.9 %
5-40 SYRINGE (ML) INJECTION EVERY 12 HOURS SCHEDULED
Status: CANCELLED | OUTPATIENT
Start: 2025-02-28

## 2025-02-28 RX ORDER — SODIUM CHLORIDE 9 MG/ML
INJECTION, SOLUTION INTRAVENOUS PRN
Status: CANCELLED | OUTPATIENT
Start: 2025-02-28

## 2025-02-28 RX ORDER — SODIUM CHLORIDE 0.9 % (FLUSH) 0.9 %
5-40 SYRINGE (ML) INJECTION PRN
Status: CANCELLED | OUTPATIENT
Start: 2025-02-28

## 2025-03-02 ENCOUNTER — APPOINTMENT (OUTPATIENT)
Dept: GENERAL RADIOLOGY | Age: 40
DRG: 981 | End: 2025-03-02
Payer: MEDICARE

## 2025-03-02 ENCOUNTER — APPOINTMENT (OUTPATIENT)
Dept: CT IMAGING | Age: 40
DRG: 981 | End: 2025-03-02
Payer: MEDICARE

## 2025-03-02 ENCOUNTER — HOSPITAL ENCOUNTER (INPATIENT)
Age: 40
LOS: 9 days | Discharge: NURSING, REHAB OR LONG-TERM CARE | DRG: 981 | End: 2025-03-11
Attending: EMERGENCY MEDICINE | Admitting: INTERNAL MEDICINE
Payer: MEDICARE

## 2025-03-02 DIAGNOSIS — S82.852S CLOSED TRIMALLEOLAR FRACTURE OF LEFT ANKLE, SEQUELA: ICD-10-CM

## 2025-03-02 DIAGNOSIS — D64.9 ACUTE ON CHRONIC ANEMIA: Primary | ICD-10-CM

## 2025-03-02 DIAGNOSIS — R11.2 NAUSEA VOMITING AND DIARRHEA: ICD-10-CM

## 2025-03-02 DIAGNOSIS — S82.852A TRIMALLEOLAR FRACTURE OF ANKLE, CLOSED, LEFT, INITIAL ENCOUNTER: ICD-10-CM

## 2025-03-02 DIAGNOSIS — E87.5 HYPERKALEMIA: ICD-10-CM

## 2025-03-02 DIAGNOSIS — R19.7 NAUSEA VOMITING AND DIARRHEA: ICD-10-CM

## 2025-03-02 PROBLEM — Z99.2 ESRD NEEDING DIALYSIS (HCC): Status: RESOLVED | Noted: 2023-07-11 | Resolved: 2025-03-02

## 2025-03-02 PROBLEM — L03.211 FACIAL CELLULITIS: Status: RESOLVED | Noted: 2018-09-28 | Resolved: 2025-03-02

## 2025-03-02 PROBLEM — N18.6 ESRD (END STAGE RENAL DISEASE) (HCC): Status: RESOLVED | Noted: 2023-05-04 | Resolved: 2025-03-02

## 2025-03-02 PROBLEM — N18.6 ESRD NEEDING DIALYSIS (HCC): Status: RESOLVED | Noted: 2023-07-11 | Resolved: 2025-03-02

## 2025-03-02 LAB
ALBUMIN SERPL-MCNC: 3.4 G/DL (ref 3.5–5.2)
ALP SERPL-CCNC: 91 U/L (ref 35–104)
ALT SERPL-CCNC: 8 U/L (ref 0–32)
ANION GAP SERPL CALCULATED.3IONS-SCNC: 16 MMOL/L (ref 7–16)
AST SERPL-CCNC: 27 U/L (ref 0–31)
BASOPHILS # BLD: 0.02 K/UL (ref 0–0.2)
BASOPHILS NFR BLD: 0 % (ref 0–2)
BILIRUB SERPL-MCNC: 0.3 MG/DL (ref 0–1.2)
BUN BLD-MCNC: 84 MG/DL (ref 6–20)
BUN SERPL-MCNC: 82 MG/DL (ref 6–20)
CALCIUM SERPL-MCNC: 9.8 MG/DL (ref 8.6–10.2)
CHLORIDE BLD-SCNC: 94 MMOL/L (ref 100–108)
CHLORIDE SERPL-SCNC: 87 MMOL/L (ref 98–107)
CO2 BLD CALC-SCNC: 30 MMOL/L (ref 22–29)
CO2 SERPL-SCNC: 28 MMOL/L (ref 22–29)
CREAT BLD-MCNC: 9.9 MG/DL (ref 0.5–1)
CREAT SERPL-MCNC: 10.4 MG/DL (ref 0.5–1)
CRITICAL ACTION: NORMAL
CRITICAL NOTIFICATION DATE/TIME: NORMAL
CRITICAL NOTIFICATION: NORMAL
CRITICAL VALUE READ BACK: YES
EGFR, POC: 5 ML/MIN/1.73M2
EOSINOPHIL # BLD: 0.24 K/UL (ref 0.05–0.5)
EOSINOPHILS RELATIVE PERCENT: 4 % (ref 0–6)
ERYTHROCYTE [DISTWIDTH] IN BLOOD BY AUTOMATED COUNT: 16.4 % (ref 11.5–15)
FERRITIN SERPL-MCNC: 360 NG/ML
FLUAV RNA RESP QL NAA+PROBE: NOT DETECTED
FLUBV RNA RESP QL NAA+PROBE: NOT DETECTED
FOLATE SERPL-MCNC: >20 NG/ML (ref 4.8–24.2)
GFR, ESTIMATED: 4 ML/MIN/1.73M2
GLUCOSE BLD-MCNC: 151 MG/DL (ref 74–99)
GLUCOSE BLD-MCNC: 160 MG/DL
GLUCOSE BLD-MCNC: 160 MG/DL (ref 74–99)
GLUCOSE BLD-MCNC: 163 MG/DL (ref 74–99)
GLUCOSE BLD-MCNC: 226 MG/DL (ref 74–99)
GLUCOSE SERPL-MCNC: 133 MG/DL (ref 74–99)
HCT VFR BLD AUTO: 15.8 % (ref 34–48)
HGB BLD-MCNC: 5.2 G/DL (ref 11.5–15.5)
IMM GRANULOCYTES # BLD AUTO: <0.03 K/UL (ref 0–0.58)
IMM GRANULOCYTES NFR BLD: 0 % (ref 0–5)
IMM RETICS NFR: 11.4 % (ref 3–15.9)
LACTATE BLDV-SCNC: 0.8 MMOL/L (ref 0.5–2.2)
LIPASE SERPL-CCNC: 11 U/L (ref 13–60)
LYMPHOCYTES NFR BLD: 0.82 K/UL (ref 1.5–4)
LYMPHOCYTES RELATIVE PERCENT: 13 % (ref 20–42)
MAGNESIUM SERPL-MCNC: 2.4 MG/DL (ref 1.6–2.6)
MCH RBC QN AUTO: 28.4 PG (ref 26–35)
MCHC RBC AUTO-ENTMCNC: 32.9 G/DL (ref 32–34.5)
MCV RBC AUTO: 86.3 FL (ref 80–99.9)
MONOCYTES NFR BLD: 0.56 K/UL (ref 0.1–0.95)
MONOCYTES NFR BLD: 9 % (ref 2–12)
NEUTROPHILS NFR BLD: 75 % (ref 43–80)
NEUTS SEG NFR BLD: 4.92 K/UL (ref 1.8–7.3)
PLATELET # BLD AUTO: 276 K/UL (ref 130–450)
PMV BLD AUTO: 10 FL (ref 7–12)
POC ANION GAP: 4 MMOL/L (ref 7–16)
POTASSIUM BLD-SCNC: 5.8 MMOL/L (ref 3.5–5)
POTASSIUM SERPL-SCNC: 6 MMOL/L (ref 3.5–5)
POTASSIUM SERPL-SCNC: 6.7 MMOL/L (ref 3.5–5)
PROT SERPL-MCNC: 7.4 G/DL (ref 6.4–8.3)
RBC # BLD AUTO: 1.83 M/UL (ref 3.5–5.5)
RETIC HEMOGLOBIN: 25.4 PG (ref 28.2–36.6)
RETICS # AUTO: 0.03 M/UL
RETICS/RBC NFR AUTO: 2 % (ref 0.4–1.9)
SARS-COV-2 RNA RESP QL NAA+PROBE: NOT DETECTED
SODIUM BLD-SCNC: 128 MMOL/L (ref 132–146)
SODIUM SERPL-SCNC: 131 MMOL/L (ref 132–146)
SOURCE: NORMAL
SPECIMEN DESCRIPTION: NORMAL
TRANSFERRIN SERPL-MCNC: 159 MG/DL (ref 200–360)
VIT B12 SERPL-MCNC: >2000 PG/ML (ref 211–946)
WBC OTHER # BLD: 6.6 K/UL (ref 4.5–11.5)

## 2025-03-02 PROCEDURE — 83735 ASSAY OF MAGNESIUM: CPT

## 2025-03-02 PROCEDURE — 94640 AIRWAY INHALATION TREATMENT: CPT

## 2025-03-02 PROCEDURE — 82607 VITAMIN B-12: CPT

## 2025-03-02 PROCEDURE — P9016 RBC LEUKOCYTES REDUCED: HCPCS

## 2025-03-02 PROCEDURE — 2580000003 HC RX 258: Performed by: EMERGENCY MEDICINE

## 2025-03-02 PROCEDURE — 86850 RBC ANTIBODY SCREEN: CPT

## 2025-03-02 PROCEDURE — 6370000000 HC RX 637 (ALT 250 FOR IP): Performed by: EMERGENCY MEDICINE

## 2025-03-02 PROCEDURE — 86900 BLOOD TYPING SEROLOGIC ABO: CPT

## 2025-03-02 PROCEDURE — 82947 ASSAY GLUCOSE BLOOD QUANT: CPT

## 2025-03-02 PROCEDURE — 6360000002 HC RX W HCPCS

## 2025-03-02 PROCEDURE — 80051 ELECTROLYTE PANEL: CPT

## 2025-03-02 PROCEDURE — 80053 COMPREHEN METABOLIC PANEL: CPT

## 2025-03-02 PROCEDURE — 85045 AUTOMATED RETICULOCYTE COUNT: CPT

## 2025-03-02 PROCEDURE — 83605 ASSAY OF LACTIC ACID: CPT

## 2025-03-02 PROCEDURE — 84466 ASSAY OF TRANSFERRIN: CPT

## 2025-03-02 PROCEDURE — 6360000002 HC RX W HCPCS: Performed by: EMERGENCY MEDICINE

## 2025-03-02 PROCEDURE — 96375 TX/PRO/DX INJ NEW DRUG ADDON: CPT

## 2025-03-02 PROCEDURE — 86923 COMPATIBILITY TEST ELECTRIC: CPT

## 2025-03-02 PROCEDURE — 96374 THER/PROPH/DIAG INJ IV PUSH: CPT

## 2025-03-02 PROCEDURE — 82746 ASSAY OF FOLIC ACID SERUM: CPT

## 2025-03-02 PROCEDURE — 87636 SARSCOV2 & INF A&B AMP PRB: CPT

## 2025-03-02 PROCEDURE — 83540 ASSAY OF IRON: CPT

## 2025-03-02 PROCEDURE — 83690 ASSAY OF LIPASE: CPT

## 2025-03-02 PROCEDURE — 86901 BLOOD TYPING SEROLOGIC RH(D): CPT

## 2025-03-02 PROCEDURE — 30233N1 TRANSFUSION OF NONAUTOLOGOUS RED BLOOD CELLS INTO PERIPHERAL VEIN, PERCUTANEOUS APPROACH: ICD-10-PCS | Performed by: INTERNAL MEDICINE

## 2025-03-02 PROCEDURE — 2580000003 HC RX 258

## 2025-03-02 PROCEDURE — 85025 COMPLETE CBC W/AUTO DIFF WBC: CPT

## 2025-03-02 PROCEDURE — 93005 ELECTROCARDIOGRAM TRACING: CPT

## 2025-03-02 PROCEDURE — 82565 ASSAY OF CREATININE: CPT

## 2025-03-02 PROCEDURE — 74176 CT ABD & PELVIS W/O CONTRAST: CPT

## 2025-03-02 PROCEDURE — 84520 ASSAY OF UREA NITROGEN: CPT

## 2025-03-02 PROCEDURE — 2060000000 HC ICU INTERMEDIATE R&B

## 2025-03-02 PROCEDURE — 82962 GLUCOSE BLOOD TEST: CPT

## 2025-03-02 PROCEDURE — 99223 1ST HOSP IP/OBS HIGH 75: CPT | Performed by: INTERNAL MEDICINE

## 2025-03-02 PROCEDURE — 73610 X-RAY EXAM OF ANKLE: CPT

## 2025-03-02 PROCEDURE — 99291 CRITICAL CARE FIRST HOUR: CPT

## 2025-03-02 PROCEDURE — 96361 HYDRATE IV INFUSION ADD-ON: CPT

## 2025-03-02 PROCEDURE — 82728 ASSAY OF FERRITIN: CPT

## 2025-03-02 PROCEDURE — 84132 ASSAY OF SERUM POTASSIUM: CPT

## 2025-03-02 PROCEDURE — 6360000002 HC RX W HCPCS: Performed by: INTERNAL MEDICINE

## 2025-03-02 PROCEDURE — 73590 X-RAY EXAM OF LOWER LEG: CPT

## 2025-03-02 RX ORDER — DEXTROSE MONOHYDRATE 100 MG/ML
INJECTION, SOLUTION INTRAVENOUS CONTINUOUS PRN
Status: DISCONTINUED | OUTPATIENT
Start: 2025-03-02 | End: 2025-03-11 | Stop reason: HOSPADM

## 2025-03-02 RX ORDER — SUCRALFATE 1 G/1
1 TABLET ORAL
Status: DISCONTINUED | OUTPATIENT
Start: 2025-03-03 | End: 2025-03-11 | Stop reason: HOSPADM

## 2025-03-02 RX ORDER — INSULIN LISPRO 100 [IU]/ML
4 INJECTION, SOLUTION INTRAVENOUS; SUBCUTANEOUS
Status: DISCONTINUED | OUTPATIENT
Start: 2025-03-02 | End: 2025-03-11 | Stop reason: HOSPADM

## 2025-03-02 RX ORDER — SODIUM CHLORIDE 9 MG/ML
INJECTION, SOLUTION INTRAVENOUS PRN
Status: DISCONTINUED | OUTPATIENT
Start: 2025-03-02 | End: 2025-03-03

## 2025-03-02 RX ORDER — MULTIVITAMIN WITH IRON
1 TABLET ORAL DAILY
Status: DISCONTINUED | OUTPATIENT
Start: 2025-03-03 | End: 2025-03-11 | Stop reason: HOSPADM

## 2025-03-02 RX ORDER — PANTOPRAZOLE SODIUM 40 MG/10ML
40 INJECTION, POWDER, LYOPHILIZED, FOR SOLUTION INTRAVENOUS 2 TIMES DAILY
Status: DISCONTINUED | OUTPATIENT
Start: 2025-03-02 | End: 2025-03-11 | Stop reason: HOSPADM

## 2025-03-02 RX ORDER — BENZONATATE 100 MG/1
100 CAPSULE ORAL 3 TIMES DAILY PRN
Status: DISCONTINUED | OUTPATIENT
Start: 2025-03-02 | End: 2025-03-11 | Stop reason: HOSPADM

## 2025-03-02 RX ORDER — HYDRALAZINE HYDROCHLORIDE 20 MG/ML
10 INJECTION INTRAMUSCULAR; INTRAVENOUS EVERY 6 HOURS PRN
Status: DISCONTINUED | OUTPATIENT
Start: 2025-03-02 | End: 2025-03-11 | Stop reason: HOSPADM

## 2025-03-02 RX ORDER — BISACODYL 10 MG
10 SUPPOSITORY, RECTAL RECTAL DAILY PRN
Status: DISCONTINUED | OUTPATIENT
Start: 2025-03-02 | End: 2025-03-11 | Stop reason: HOSPADM

## 2025-03-02 RX ORDER — GLUCAGON 1 MG/ML
1 KIT INJECTION PRN
Status: DISCONTINUED | OUTPATIENT
Start: 2025-03-02 | End: 2025-03-02

## 2025-03-02 RX ORDER — DIPHENHYDRAMINE HCL 25 MG
25 TABLET ORAL EVERY 6 HOURS PRN
Status: DISCONTINUED | OUTPATIENT
Start: 2025-03-02 | End: 2025-03-11 | Stop reason: HOSPADM

## 2025-03-02 RX ORDER — 0.9 % SODIUM CHLORIDE 0.9 %
1000 INTRAVENOUS SOLUTION INTRAVENOUS ONCE
Status: DISCONTINUED | OUTPATIENT
Start: 2025-03-02 | End: 2025-03-02

## 2025-03-02 RX ORDER — ALBUTEROL SULFATE 0.83 MG/ML
5 SOLUTION RESPIRATORY (INHALATION) EVERY 4 HOURS PRN
Status: DISCONTINUED | OUTPATIENT
Start: 2025-03-02 | End: 2025-03-11 | Stop reason: HOSPADM

## 2025-03-02 RX ORDER — ACETAMINOPHEN 325 MG/1
650 TABLET ORAL EVERY 6 HOURS PRN
Status: DISCONTINUED | OUTPATIENT
Start: 2025-03-02 | End: 2025-03-11 | Stop reason: HOSPADM

## 2025-03-02 RX ORDER — FENTANYL CITRATE 50 UG/ML
50 INJECTION, SOLUTION INTRAMUSCULAR; INTRAVENOUS ONCE
Status: COMPLETED | OUTPATIENT
Start: 2025-03-02 | End: 2025-03-02

## 2025-03-02 RX ORDER — SODIUM CHLORIDE 0.9 % (FLUSH) 0.9 %
5-40 SYRINGE (ML) INJECTION EVERY 12 HOURS SCHEDULED
Status: DISCONTINUED | OUTPATIENT
Start: 2025-03-02 | End: 2025-03-11 | Stop reason: HOSPADM

## 2025-03-02 RX ORDER — GLUCAGON 1 MG/ML
1 KIT INJECTION PRN
Status: DISCONTINUED | OUTPATIENT
Start: 2025-03-02 | End: 2025-03-11 | Stop reason: HOSPADM

## 2025-03-02 RX ORDER — CLOPIDOGREL BISULFATE 75 MG/1
75 TABLET ORAL DAILY
Status: DISCONTINUED | OUTPATIENT
Start: 2025-03-02 | End: 2025-03-09

## 2025-03-02 RX ORDER — ONDANSETRON 4 MG/1
4 TABLET, ORALLY DISINTEGRATING ORAL EVERY 8 HOURS PRN
Status: DISCONTINUED | OUTPATIENT
Start: 2025-03-02 | End: 2025-03-11 | Stop reason: HOSPADM

## 2025-03-02 RX ORDER — INSULIN GLARGINE 100 [IU]/ML
8 INJECTION, SOLUTION SUBCUTANEOUS NIGHTLY
Status: DISCONTINUED | OUTPATIENT
Start: 2025-03-02 | End: 2025-03-08

## 2025-03-02 RX ORDER — ASPIRIN 81 MG/1
81 TABLET ORAL 2 TIMES DAILY
Status: DISCONTINUED | OUTPATIENT
Start: 2025-03-02 | End: 2025-03-09

## 2025-03-02 RX ORDER — ALBUTEROL SULFATE 0.83 MG/ML
10 SOLUTION RESPIRATORY (INHALATION) ONCE
Status: COMPLETED | OUTPATIENT
Start: 2025-03-02 | End: 2025-03-02

## 2025-03-02 RX ORDER — CARVEDILOL 25 MG/1
25 TABLET ORAL EVERY MORNING
Status: DISCONTINUED | OUTPATIENT
Start: 2025-03-03 | End: 2025-03-11 | Stop reason: HOSPADM

## 2025-03-02 RX ORDER — ONDANSETRON 2 MG/ML
4 INJECTION INTRAMUSCULAR; INTRAVENOUS ONCE
Status: COMPLETED | OUTPATIENT
Start: 2025-03-02 | End: 2025-03-02

## 2025-03-02 RX ORDER — POLYETHYLENE GLYCOL 3350 17 G/17G
17 POWDER, FOR SOLUTION ORAL DAILY PRN
Status: DISCONTINUED | OUTPATIENT
Start: 2025-03-02 | End: 2025-03-11 | Stop reason: HOSPADM

## 2025-03-02 RX ORDER — CALCIUM ACETATE 667 MG/1
1 CAPSULE ORAL
Status: DISCONTINUED | OUTPATIENT
Start: 2025-03-03 | End: 2025-03-11 | Stop reason: HOSPADM

## 2025-03-02 RX ORDER — FERROUS SULFATE 325(65) MG
325 TABLET ORAL EVERY OTHER DAY
Status: DISCONTINUED | OUTPATIENT
Start: 2025-03-03 | End: 2025-03-03

## 2025-03-02 RX ORDER — SODIUM CHLORIDE 0.9 % (FLUSH) 0.9 %
5-40 SYRINGE (ML) INJECTION PRN
Status: DISCONTINUED | OUTPATIENT
Start: 2025-03-02 | End: 2025-03-11 | Stop reason: HOSPADM

## 2025-03-02 RX ORDER — ACETAMINOPHEN 650 MG/1
650 SUPPOSITORY RECTAL EVERY 6 HOURS PRN
Status: DISCONTINUED | OUTPATIENT
Start: 2025-03-02 | End: 2025-03-11 | Stop reason: HOSPADM

## 2025-03-02 RX ORDER — ONDANSETRON 2 MG/ML
4 INJECTION INTRAMUSCULAR; INTRAVENOUS EVERY 6 HOURS PRN
Status: DISCONTINUED | OUTPATIENT
Start: 2025-03-02 | End: 2025-03-11 | Stop reason: HOSPADM

## 2025-03-02 RX ORDER — CALCIUM GLUCONATE 20 MG/ML
1000 INJECTION, SOLUTION INTRAVENOUS ONCE
Status: COMPLETED | OUTPATIENT
Start: 2025-03-02 | End: 2025-03-02

## 2025-03-02 RX ORDER — ENOXAPARIN SODIUM 100 MG/ML
30 INJECTION SUBCUTANEOUS DAILY
Status: DISCONTINUED | OUTPATIENT
Start: 2025-03-02 | End: 2025-03-02

## 2025-03-02 RX ORDER — SODIUM CHLORIDE 9 MG/ML
INJECTION, SOLUTION INTRAVENOUS PRN
Status: DISCONTINUED | OUTPATIENT
Start: 2025-03-02 | End: 2025-03-11 | Stop reason: HOSPADM

## 2025-03-02 RX ORDER — CALCIUM CARBONATE 500 MG/1
500 TABLET, CHEWABLE ORAL 3 TIMES DAILY PRN
Status: DISCONTINUED | OUTPATIENT
Start: 2025-03-02 | End: 2025-03-11 | Stop reason: HOSPADM

## 2025-03-02 RX ADMIN — ALBUTEROL SULFATE 10 MG: 2.5 SOLUTION RESPIRATORY (INHALATION) at 17:40

## 2025-03-02 RX ADMIN — SODIUM CHLORIDE 1000 ML: 0.9 INJECTION, SOLUTION INTRAVENOUS at 16:38

## 2025-03-02 RX ADMIN — ONDANSETRON 4 MG: 2 INJECTION, SOLUTION INTRAMUSCULAR; INTRAVENOUS at 16:35

## 2025-03-02 RX ADMIN — CALCIUM GLUCONATE 1000 MG: 20 INJECTION, SOLUTION INTRAVENOUS at 17:56

## 2025-03-02 RX ADMIN — INSULIN HUMAN 10 UNITS: 100 INJECTION, SOLUTION PARENTERAL at 18:10

## 2025-03-02 RX ADMIN — PANTOPRAZOLE SODIUM 40 MG: 40 INJECTION, POWDER, FOR SOLUTION INTRAVENOUS at 21:07

## 2025-03-02 RX ADMIN — DEXTROSE MONOHYDRATE 250 ML: 10 INJECTION, SOLUTION INTRAVENOUS at 18:10

## 2025-03-02 RX ADMIN — FENTANYL CITRATE 50 MCG: 50 INJECTION INTRAMUSCULAR; INTRAVENOUS at 16:35

## 2025-03-02 ASSESSMENT — PAIN DESCRIPTION - LOCATION: LOCATION: ANKLE

## 2025-03-02 ASSESSMENT — PAIN SCALES - WONG BAKER: WONGBAKER_NUMERICALRESPONSE: NO HURT

## 2025-03-02 ASSESSMENT — PAIN DESCRIPTION - ORIENTATION: ORIENTATION: LEFT

## 2025-03-02 ASSESSMENT — LIFESTYLE VARIABLES: HOW OFTEN DO YOU HAVE A DRINK CONTAINING ALCOHOL: NEVER

## 2025-03-02 ASSESSMENT — PAIN SCALES - GENERAL
PAINLEVEL_OUTOF10: 8
PAINLEVEL_OUTOF10: 0

## 2025-03-02 NOTE — H&P
74 - 99 mg/dL       Imaging  XR TIBIA FIBULA LEFT (2 VIEWS)    Result Date: 3/2/2025  EXAMINATION: 4 XRAY VIEWS OF THE LEFT TIBIA AND FIBULA 3/2/2025 5:17 pm COMPARISON: None. HISTORY: ORDERING SYSTEM PROVIDED HISTORY: ex fix TECHNOLOGIST PROVIDED HISTORY: Reason for exam:->ex fix FINDINGS: There is an external fixation device transfixing the trimalleolar ankle joint fracture.  No complications are observed.  No loosening or infection.  Bone mineral density is diminished.     External fixation device for trimalleolar fracture of the ankle, alignment appears appropriate.            Assessment and Plan  Patient is a 39 y.o. female who presented with nausea, vomiting, diarrhea.   The active problem list is as follows:    Principal Problem:    Acute on chronic anemia  Active Problems:    Hyperkalemia    ESRD on hemodialysis (HCC)    Chronic hypoxic respiratory failure, on home oxygen therapy (HCC)  Resolved Problems:    * No resolved hospital problems. *      Acute on chronic anemia- Ordered 1 unit PRBC in ER. Follow H&H. Transfuse for hemoglobin <7. Hold plavix, lovenox  Nausea/vomiting/diarrhea/abdominal pain- check CT abdomen pelvis.   ESRD on dialysis- nephrology consulted from ER for dialysis. Received hyperkalemia protocol. Follow BMP.  Hyperkalemia- received hyperkalemia protocol, follow BMP.   Recent L trimalleolar ankle fracture-  She underwent external fixation with application on 2/15. She was discharged 2/21/25. She was placed on lovenox on discharge. There is a plan for L ex fix removal 3/6.   Chronic hypoxic respiratory failure  Insulin dependent type 1 diabetes mellitus- resume home medications. Hypoglycemia protocol.     Routine labs in the morning.  DVT prophylaxis.  Please see orders for further management and care.      Additional work up or/and treatment plan may be added today or thereafter based on clinical progression. I am managing admission portion of patient care. Some medical issues are

## 2025-03-02 NOTE — ED PROVIDER NOTES
[facility] EMERGENCY DEPARTMENT  EMERGENCY DEPARTMENT ENCOUNTER        Pt Name: Michelle Nettles  MRN: [id number]  Birthdate 1985  Date of evaluation: 3/2/2025  Provider: Glenys Nolen DO  PCP: Rogers Rodríguez MD  Note Started: 4:26 PM EST 3/2/25    CHIEF COMPLAINT       Chief Complaint   Patient presents with    Nausea    Vomiting    Diarrhea     N/V/D since Thursday. Ankle sx on Thursday. Has been sick since then. From [facility]. Non weight bearing to surgical ankle. Baseline is 5 L NC.        HISTORY OF PRESENT ILLNESS: 1 or more Elements   History From: Patient    Limitations to history : None  Social Determinants : None    Michelle Nettles is a 39 y.o. female who presents for nausea, vomiting and diarrhea.  Patient states that her symptoms started on Thursday. She does not make any urine she is on dialysis.  She has not missed any dialysis.  She states that she had ankle surgery on Wednesday.  She started getting sick the next day.  She is chronically on 5 L via nasal cannula.  She denies any shortness of breath or chest pain.  Denies any fever, chills, headache, dizziness, vision changes, neck tenderness or stiffness, weakness, palpitations, leg swelling/tenderness, cough, abd pain, dysuria, hematuria, constipation, bloody stools.    Nursing Notes were all reviewed and agreed with or any disagreements were addressed in the HPI.    ROS:   Pertinent positives and negatives are stated within HPI, all other systems reviewed and are negative.      --------------------------------------------- PAST HISTORY ---------------------------------------------  Past Medical History:  has a past medical history of JOLLY (acute kidney injury), Anemia, AVF (arteriovenous fistula), Cellulitis, face, CHF (congestive heart failure) ([facility]), Chronic kidney disease, Chronic respiratory failure with hypoxia ([facility]), Dialysis AV fistula malfunction, initial encounter,

## 2025-03-03 ENCOUNTER — APPOINTMENT (OUTPATIENT)
Dept: GENERAL RADIOLOGY | Age: 40
DRG: 981 | End: 2025-03-03
Payer: MEDICARE

## 2025-03-03 LAB
ALBUMIN SERPL-MCNC: 3.1 G/DL (ref 3.5–5.2)
ALP SERPL-CCNC: 79 U/L (ref 35–104)
ALT SERPL-CCNC: 6 U/L (ref 0–32)
ANION GAP SERPL CALCULATED.3IONS-SCNC: 20 MMOL/L (ref 7–16)
AST SERPL-CCNC: 17 U/L (ref 0–31)
B PARAP IS1001 DNA NPH QL NAA+NON-PROBE: NOT DETECTED
B PERT DNA SPEC QL NAA+PROBE: NOT DETECTED
BILIRUB SERPL-MCNC: 0.4 MG/DL (ref 0–1.2)
BUN SERPL-MCNC: 97 MG/DL (ref 6–20)
C PNEUM DNA NPH QL NAA+NON-PROBE: NOT DETECTED
CALCIUM SERPL-MCNC: 9.1 MG/DL (ref 8.6–10.2)
CHLORIDE SERPL-SCNC: 86 MMOL/L (ref 98–107)
CHOLEST SERPL-MCNC: 112 MG/DL
CHP ED QC CHECK: YES
CO2 SERPL-SCNC: 22 MMOL/L (ref 22–29)
CREAT SERPL-MCNC: 11.2 MG/DL (ref 0.5–1)
EKG ATRIAL RATE: 107 BPM
EKG P AXIS: 54 DEGREES
EKG P-R INTERVAL: 122 MS
EKG Q-T INTERVAL: 314 MS
EKG QRS DURATION: 72 MS
EKG QTC CALCULATION (BAZETT): 419 MS
EKG R AXIS: 11 DEGREES
EKG T AXIS: 57 DEGREES
EKG VENTRICULAR RATE: 107 BPM
ERYTHROCYTE [DISTWIDTH] IN BLOOD BY AUTOMATED COUNT: 15 % (ref 11.5–15)
FLUAV RNA NPH QL NAA+NON-PROBE: NOT DETECTED
FLUBV RNA NPH QL NAA+NON-PROBE: NOT DETECTED
GFR, ESTIMATED: 4 ML/MIN/1.73M2
GLUCOSE BLD-MCNC: 240 MG/DL (ref 74–99)
GLUCOSE BLD-MCNC: 278 MG/DL (ref 74–99)
GLUCOSE BLD-MCNC: 283 MG/DL (ref 74–99)
GLUCOSE BLD-MCNC: 372 MG/DL (ref 74–99)
GLUCOSE SERPL-MCNC: 466 MG/DL (ref 74–99)
HADV DNA NPH QL NAA+NON-PROBE: NOT DETECTED
HBA1C MFR BLD: 5.9 % (ref 4–5.6)
HCOV 229E RNA NPH QL NAA+NON-PROBE: NOT DETECTED
HCOV HKU1 RNA NPH QL NAA+NON-PROBE: NOT DETECTED
HCOV NL63 RNA NPH QL NAA+NON-PROBE: NOT DETECTED
HCOV OC43 RNA NPH QL NAA+NON-PROBE: NOT DETECTED
HCT VFR BLD AUTO: 15.5 % (ref 34–48)
HCT VFR BLD AUTO: 21.3 % (ref 34–48)
HCT VFR BLD AUTO: 23.7 % (ref 34–48)
HDLC SERPL-MCNC: 30 MG/DL
HEMOCCULT STL QL: POSITIVE
HGB BLD-MCNC: 5.2 G/DL (ref 11.5–15.5)
HGB BLD-MCNC: 7.3 G/DL (ref 11.5–15.5)
HGB BLD-MCNC: 8 G/DL (ref 11.5–15.5)
HMPV RNA NPH QL NAA+NON-PROBE: NOT DETECTED
HPIV1 RNA NPH QL NAA+NON-PROBE: NOT DETECTED
HPIV2 RNA NPH QL NAA+NON-PROBE: NOT DETECTED
HPIV3 RNA NPH QL NAA+NON-PROBE: NOT DETECTED
HPIV4 RNA NPH QL NAA+NON-PROBE: NOT DETECTED
IRON SATN MFR SERPL: 14 % (ref 15–50)
IRON SERPL-MCNC: 28 UG/DL (ref 37–145)
LDLC SERPL CALC-MCNC: 62 MG/DL
M PNEUMO DNA NPH QL NAA+NON-PROBE: NOT DETECTED
MAGNESIUM SERPL-MCNC: 2.3 MG/DL (ref 1.6–2.6)
MCH RBC QN AUTO: 29.2 PG (ref 26–35)
MCHC RBC AUTO-ENTMCNC: 33.5 G/DL (ref 32–34.5)
MCV RBC AUTO: 87.1 FL (ref 80–99.9)
PHOSPHATE SERPL-MCNC: 3.8 MG/DL (ref 2.5–4.5)
PLATELET # BLD AUTO: 218 K/UL (ref 130–450)
PMV BLD AUTO: 10.3 FL (ref 7–12)
POTASSIUM SERPL-SCNC: 3.7 MMOL/L (ref 3.5–5)
POTASSIUM SERPL-SCNC: 7.7 MMOL/L (ref 3.5–5)
PROT SERPL-MCNC: 6.2 G/DL (ref 6.4–8.3)
RBC # BLD AUTO: 1.78 M/UL (ref 3.5–5.5)
RSV RNA NPH QL NAA+NON-PROBE: NOT DETECTED
RV+EV RNA NPH QL NAA+NON-PROBE: NOT DETECTED
SARS-COV-2 RNA NPH QL NAA+NON-PROBE: NOT DETECTED
SODIUM SERPL-SCNC: 128 MMOL/L (ref 132–146)
SPECIMEN DESCRIPTION: NORMAL
TIBC SERPL-MCNC: 197 UG/DL (ref 250–450)
TRIGL SERPL-MCNC: 101 MG/DL
TSH SERPL DL<=0.05 MIU/L-ACNC: 10.62 UIU/ML (ref 0.27–4.2)
VLDLC SERPL CALC-MCNC: 20 MG/DL
WBC OTHER # BLD: 7.9 K/UL (ref 4.5–11.5)

## 2025-03-03 PROCEDURE — 6360000002 HC RX W HCPCS: Performed by: INTERNAL MEDICINE

## 2025-03-03 PROCEDURE — 2700000000 HC OXYGEN THERAPY PER DAY

## 2025-03-03 PROCEDURE — 6370000000 HC RX 637 (ALT 250 FOR IP): Performed by: INTERNAL MEDICINE

## 2025-03-03 PROCEDURE — 83036 HEMOGLOBIN GLYCOSYLATED A1C: CPT

## 2025-03-03 PROCEDURE — P9016 RBC LEUKOCYTES REDUCED: HCPCS

## 2025-03-03 PROCEDURE — 36415 COLL VENOUS BLD VENIPUNCTURE: CPT

## 2025-03-03 PROCEDURE — 84132 ASSAY OF SERUM POTASSIUM: CPT

## 2025-03-03 PROCEDURE — 80061 LIPID PANEL: CPT

## 2025-03-03 PROCEDURE — 5A1D70Z PERFORMANCE OF URINARY FILTRATION, INTERMITTENT, LESS THAN 6 HOURS PER DAY: ICD-10-PCS | Performed by: INTERNAL MEDICINE

## 2025-03-03 PROCEDURE — 2580000003 HC RX 258: Performed by: INTERNAL MEDICINE

## 2025-03-03 PROCEDURE — 90935 HEMODIALYSIS ONE EVALUATION: CPT

## 2025-03-03 PROCEDURE — 82962 GLUCOSE BLOOD TEST: CPT

## 2025-03-03 PROCEDURE — 83735 ASSAY OF MAGNESIUM: CPT

## 2025-03-03 PROCEDURE — 85027 COMPLETE CBC AUTOMATED: CPT

## 2025-03-03 PROCEDURE — 87040 BLOOD CULTURE FOR BACTERIA: CPT

## 2025-03-03 PROCEDURE — 0202U NFCT DS 22 TRGT SARS-COV-2: CPT

## 2025-03-03 PROCEDURE — 80053 COMPREHEN METABOLIC PANEL: CPT

## 2025-03-03 PROCEDURE — 2060000000 HC ICU INTERMEDIATE R&B

## 2025-03-03 PROCEDURE — 99232 SBSQ HOSP IP/OBS MODERATE 35: CPT | Performed by: INTERNAL MEDICINE

## 2025-03-03 PROCEDURE — P9047 ALBUMIN (HUMAN), 25%, 50ML: HCPCS | Performed by: INTERNAL MEDICINE

## 2025-03-03 PROCEDURE — 85014 HEMATOCRIT: CPT

## 2025-03-03 PROCEDURE — 85018 HEMOGLOBIN: CPT

## 2025-03-03 PROCEDURE — 93010 ELECTROCARDIOGRAM REPORT: CPT | Performed by: INTERNAL MEDICINE

## 2025-03-03 PROCEDURE — 2500000003 HC RX 250 WO HCPCS: Performed by: INTERNAL MEDICINE

## 2025-03-03 PROCEDURE — 84443 ASSAY THYROID STIM HORMONE: CPT

## 2025-03-03 PROCEDURE — 84100 ASSAY OF PHOSPHORUS: CPT

## 2025-03-03 PROCEDURE — 93005 ELECTROCARDIOGRAM TRACING: CPT | Performed by: INTERNAL MEDICINE

## 2025-03-03 PROCEDURE — 71045 X-RAY EXAM CHEST 1 VIEW: CPT

## 2025-03-03 RX ORDER — SODIUM CHLORIDE 9 MG/ML
INJECTION, SOLUTION INTRAVENOUS CONTINUOUS
Status: ACTIVE | OUTPATIENT
Start: 2025-03-03 | End: 2025-03-03

## 2025-03-03 RX ORDER — INSULIN LISPRO 100 [IU]/ML
0-8 INJECTION, SOLUTION INTRAVENOUS; SUBCUTANEOUS
Status: DISCONTINUED | OUTPATIENT
Start: 2025-03-03 | End: 2025-03-11 | Stop reason: HOSPADM

## 2025-03-03 RX ORDER — SODIUM CHLORIDE 9 MG/ML
INJECTION, SOLUTION INTRAVENOUS PRN
Status: DISCONTINUED | OUTPATIENT
Start: 2025-03-03 | End: 2025-03-03

## 2025-03-03 RX ORDER — 0.9 % SODIUM CHLORIDE 0.9 %
500 INTRAVENOUS SOLUTION INTRAVENOUS ONCE
Status: COMPLETED | OUTPATIENT
Start: 2025-03-03 | End: 2025-03-03

## 2025-03-03 RX ORDER — MIDODRINE HYDROCHLORIDE 10 MG/1
10 TABLET ORAL
Status: DISCONTINUED | OUTPATIENT
Start: 2025-03-03 | End: 2025-03-11 | Stop reason: HOSPADM

## 2025-03-03 RX ORDER — ALBUMIN (HUMAN) 12.5 G/50ML
25 SOLUTION INTRAVENOUS ONCE
Status: COMPLETED | OUTPATIENT
Start: 2025-03-03 | End: 2025-03-03

## 2025-03-03 RX ADMIN — ONDANSETRON 4 MG: 2 INJECTION, SOLUTION INTRAMUSCULAR; INTRAVENOUS at 01:22

## 2025-03-03 RX ADMIN — SODIUM CHLORIDE, PRESERVATIVE FREE 10 ML: 5 INJECTION INTRAVENOUS at 21:26

## 2025-03-03 RX ADMIN — PANTOPRAZOLE SODIUM 40 MG: 40 INJECTION, POWDER, FOR SOLUTION INTRAVENOUS at 12:26

## 2025-03-03 RX ADMIN — ALBUMIN (HUMAN) 25 G: 0.25 INJECTION, SOLUTION INTRAVENOUS at 15:49

## 2025-03-03 RX ADMIN — DIPHENHYDRAMINE HYDROCHLORIDE 25 MG: 25 TABLET ORAL at 21:37

## 2025-03-03 RX ADMIN — SODIUM CHLORIDE: 0.9 INJECTION, SOLUTION INTRAVENOUS at 16:46

## 2025-03-03 RX ADMIN — SUCRALFATE 1 G: 1 TABLET ORAL at 12:33

## 2025-03-03 RX ADMIN — SUCRALFATE 1 G: 1 TABLET ORAL at 21:25

## 2025-03-03 RX ADMIN — SUCRALFATE 1 G: 1 TABLET ORAL at 15:49

## 2025-03-03 RX ADMIN — ACETAMINOPHEN 650 MG: 325 TABLET ORAL at 12:33

## 2025-03-03 RX ADMIN — INSULIN GLARGINE 8 UNITS: 100 INJECTION, SOLUTION SUBCUTANEOUS at 21:25

## 2025-03-03 RX ADMIN — HYDROMORPHONE HYDROCHLORIDE 0.5 MG: 1 INJECTION, SOLUTION INTRAMUSCULAR; INTRAVENOUS; SUBCUTANEOUS at 21:37

## 2025-03-03 RX ADMIN — SODIUM CHLORIDE 500 ML: 0.9 INJECTION, SOLUTION INTRAVENOUS at 12:25

## 2025-03-03 RX ADMIN — FERROUS SULFATE TAB 325 MG (65 MG ELEMENTAL FE) 325 MG: 325 (65 FE) TAB at 12:33

## 2025-03-03 RX ADMIN — SODIUM CHLORIDE 500 ML: 0.9 INJECTION, SOLUTION INTRAVENOUS at 14:43

## 2025-03-03 RX ADMIN — SUCRALFATE 1 G: 1 TABLET ORAL at 06:11

## 2025-03-03 RX ADMIN — PANTOPRAZOLE SODIUM 40 MG: 40 INJECTION, POWDER, FOR SOLUTION INTRAVENOUS at 21:25

## 2025-03-03 RX ADMIN — ACETAMINOPHEN 650 MG: 325 TABLET ORAL at 04:14

## 2025-03-03 RX ADMIN — SODIUM CHLORIDE, PRESERVATIVE FREE 10 ML: 5 INJECTION INTRAVENOUS at 12:32

## 2025-03-03 RX ADMIN — INSULIN LISPRO 4 UNITS: 100 INJECTION, SOLUTION INTRAVENOUS; SUBCUTANEOUS at 15:49

## 2025-03-03 RX ADMIN — MIDODRINE HYDROCHLORIDE 10 MG: 5 TABLET ORAL at 15:49

## 2025-03-03 RX ADMIN — DIPHENHYDRAMINE HYDROCHLORIDE 25 MG: 25 TABLET ORAL at 03:25

## 2025-03-03 RX ADMIN — INSULIN LISPRO 4 UNITS: 100 INJECTION, SOLUTION INTRAVENOUS; SUBCUTANEOUS at 21:24

## 2025-03-03 RX ADMIN — HYDROMORPHONE HYDROCHLORIDE 0.5 MG: 1 INJECTION, SOLUTION INTRAMUSCULAR; INTRAVENOUS; SUBCUTANEOUS at 17:43

## 2025-03-03 ASSESSMENT — PAIN DESCRIPTION - DESCRIPTORS
DESCRIPTORS: ACHING;DISCOMFORT;SORE
DESCRIPTORS: ACHING;CRAMPING;DISCOMFORT

## 2025-03-03 ASSESSMENT — PAIN DESCRIPTION - LOCATION
LOCATION: ABDOMEN
LOCATION: ABDOMEN

## 2025-03-03 ASSESSMENT — PAIN SCALES - GENERAL
PAINLEVEL_OUTOF10: 8
PAINLEVEL_OUTOF10: 3
PAINLEVEL_OUTOF10: 4
PAINLEVEL_OUTOF10: 9

## 2025-03-03 ASSESSMENT — PAIN DESCRIPTION - ORIENTATION: ORIENTATION: MID

## 2025-03-03 ASSESSMENT — PAIN - FUNCTIONAL ASSESSMENT: PAIN_FUNCTIONAL_ASSESSMENT: 0-10

## 2025-03-03 NOTE — PLAN OF CARE
Patient admitted for concern of GI bleed given acute drop in hemoglobin.  Patient received HD this morning and has had hypotension since. She has had 3 units prbc since admission (1 in ED and 2 during HD this morning). Upon arrival to floor, MAP sustaining in high 50s and low 60s with -140s. Patient complaining on sharp chest pain on inspiration and abdominal pain. EKG showed sinus tachycardia.  Stat CXR was normal. Patient then received  bolus x 2 and albumin x 1. She was also started in midodrine 10 mg TID per Nephrology. Despite above efforts, patient continues to be hypotensive/tachycardic with lethargy. Spoke with Dr. Escobar and patient admitted for MICU transfer. Transfer ordered. Infectious work up sent.    Electronically signed by Elias Guzmán MD on 3/3/2025 at 5:00 PM    Bedside nurse stated BP and mentation improved. Patient refusing MICU transfer. Will continue to observe on floor for now. MICU attending notified. Transfer discontinued.    Electronically signed by Elias Guzmán MD on 3/3/2025 at 5:40 PM

## 2025-03-03 NOTE — FLOWSHEET NOTE
03/03/25 1042   Vital Signs   BP (!) 109/56   Temp 97.3 °F (36.3 °C)   Pulse (!) 118   Respirations 20   Weight - Scale   (floor to weigh, cart)   Post-Hemodialysis Assessment   Post-Treatment Procedures Blood returned;Access bleeding time < 10 minutes   Machine Disinfection Process Exterior Machine Disinfection   Rinseback Volume (ml) 300 ml   Blood Volume Processed (Liters) 89.6 L   Dialyzer Clearance Lightly streaked   Duration of Treatment (minutes) 240 minutes   Heparin Amount Administered During Treatment (mL) 0 mL   Hemodialysis Intake (ml) 1000 ml   Hemodialysis Output (ml) 693 ml   NET Removed (ml) -307   Tolerated Treatment Fair   Patient Response to Treatment Two units PRBC given on HD. HGB drawn one hour post transfusion and sent to lab. No fluid removed. Pt lethagic, arouable   Bilateral Breath Sounds Diminished   RUE Edema +1   LUE Edema +1   RLE Edema +1   LLE Edema +1   Facial Edema +2   Physician Notified Yes   Time Off 1036   Patient Disposition Return to room   Observations & Evaluations   Level of Consciousness 1

## 2025-03-03 NOTE — ED NOTES
Blood bank is aware that patient is in dialysis - blood bank is calling dialysis to notify them that product is ready.

## 2025-03-04 ENCOUNTER — TELEPHONE (OUTPATIENT)
Dept: ORTHOPEDIC SURGERY | Age: 40
End: 2025-03-04

## 2025-03-04 ENCOUNTER — APPOINTMENT (OUTPATIENT)
Dept: NUCLEAR MEDICINE | Age: 40
DRG: 981 | End: 2025-03-04
Payer: MEDICARE

## 2025-03-04 LAB
ANION GAP SERPL CALCULATED.3IONS-SCNC: 13 MMOL/L (ref 7–16)
BUN SERPL-MCNC: 14 MG/DL (ref 6–20)
CALCIUM SERPL-MCNC: 9 MG/DL (ref 8.6–10.2)
CHLORIDE SERPL-SCNC: 94 MMOL/L (ref 98–107)
CO2 SERPL-SCNC: 23 MMOL/L (ref 22–29)
CREAT SERPL-MCNC: 2.6 MG/DL (ref 0.5–1)
EKG ATRIAL RATE: 127 BPM
EKG P AXIS: 62 DEGREES
EKG P-R INTERVAL: 118 MS
EKG Q-T INTERVAL: 306 MS
EKG QRS DURATION: 72 MS
EKG QTC CALCULATION (BAZETT): 444 MS
EKG R AXIS: 12 DEGREES
EKG T AXIS: 123 DEGREES
EKG VENTRICULAR RATE: 127 BPM
ERYTHROCYTE [DISTWIDTH] IN BLOOD BY AUTOMATED COUNT: 14.4 % (ref 11.5–15)
GFR, ESTIMATED: 23 ML/MIN/1.73M2
GLUCOSE BLD-MCNC: 149 MG/DL (ref 74–99)
GLUCOSE BLD-MCNC: 163 MG/DL (ref 74–99)
GLUCOSE BLD-MCNC: 287 MG/DL (ref 74–99)
GLUCOSE SERPL-MCNC: 117 MG/DL (ref 74–99)
HCT VFR BLD AUTO: 18.1 % (ref 34–48)
HCT VFR BLD AUTO: 21.6 % (ref 34–48)
HCT VFR BLD AUTO: 23 % (ref 34–48)
HGB BLD-MCNC: 6.2 G/DL (ref 11.5–15.5)
HGB BLD-MCNC: 7.5 G/DL (ref 11.5–15.5)
HGB BLD-MCNC: 8 G/DL (ref 11.5–15.5)
MCH RBC QN AUTO: 29.5 PG (ref 26–35)
MCHC RBC AUTO-ENTMCNC: 34.7 G/DL (ref 32–34.5)
MCV RBC AUTO: 85 FL (ref 80–99.9)
PLATELET # BLD AUTO: 195 K/UL (ref 130–450)
PMV BLD AUTO: 9.2 FL (ref 7–12)
POTASSIUM SERPL-SCNC: 3.5 MMOL/L (ref 3.5–5)
RBC # BLD AUTO: 2.54 M/UL (ref 3.5–5.5)
SODIUM SERPL-SCNC: 130 MMOL/L (ref 132–146)
WBC OTHER # BLD: 7.7 K/UL (ref 4.5–11.5)

## 2025-03-04 PROCEDURE — 36415 COLL VENOUS BLD VENIPUNCTURE: CPT

## 2025-03-04 PROCEDURE — P9016 RBC LEUKOCYTES REDUCED: HCPCS

## 2025-03-04 PROCEDURE — 78582 LUNG VENTILAT&PERFUS IMAGING: CPT

## 2025-03-04 PROCEDURE — 6370000000 HC RX 637 (ALT 250 FOR IP): Performed by: INTERNAL MEDICINE

## 2025-03-04 PROCEDURE — 2500000003 HC RX 250 WO HCPCS: Performed by: INTERNAL MEDICINE

## 2025-03-04 PROCEDURE — 3430000000 HC RX DIAGNOSTIC RADIOPHARMACEUTICAL: Performed by: RADIOLOGY

## 2025-03-04 PROCEDURE — 6360000002 HC RX W HCPCS: Performed by: INTERNAL MEDICINE

## 2025-03-04 PROCEDURE — 80048 BASIC METABOLIC PNL TOTAL CA: CPT

## 2025-03-04 PROCEDURE — 2060000000 HC ICU INTERMEDIATE R&B

## 2025-03-04 PROCEDURE — A9539 TC99M PENTETATE: HCPCS | Performed by: RADIOLOGY

## 2025-03-04 PROCEDURE — 82962 GLUCOSE BLOOD TEST: CPT

## 2025-03-04 PROCEDURE — 99233 SBSQ HOSP IP/OBS HIGH 50: CPT | Performed by: INTERNAL MEDICINE

## 2025-03-04 PROCEDURE — 36430 TRANSFUSION BLD/BLD COMPNT: CPT

## 2025-03-04 PROCEDURE — 2700000000 HC OXYGEN THERAPY PER DAY

## 2025-03-04 PROCEDURE — A9540 TC99M MAA: HCPCS | Performed by: RADIOLOGY

## 2025-03-04 PROCEDURE — 85027 COMPLETE CBC AUTOMATED: CPT

## 2025-03-04 PROCEDURE — 90935 HEMODIALYSIS ONE EVALUATION: CPT

## 2025-03-04 PROCEDURE — 93010 ELECTROCARDIOGRAM REPORT: CPT | Performed by: INTERNAL MEDICINE

## 2025-03-04 RX ORDER — KIT FOR THE PREPARATION OF TECHNETIUM TC 99M PENTETATE 20 MG/1
35 INJECTION, POWDER, LYOPHILIZED, FOR SOLUTION INTRAVENOUS; RESPIRATORY (INHALATION)
Status: COMPLETED | OUTPATIENT
Start: 2025-03-04 | End: 2025-03-04

## 2025-03-04 RX ORDER — OXYCODONE HYDROCHLORIDE 5 MG/1
5 TABLET ORAL EVERY 4 HOURS PRN
Status: DISCONTINUED | OUTPATIENT
Start: 2025-03-04 | End: 2025-03-10

## 2025-03-04 RX ORDER — SODIUM CHLORIDE 9 MG/ML
INJECTION, SOLUTION INTRAVENOUS PRN
Status: DISCONTINUED | OUTPATIENT
Start: 2025-03-04 | End: 2025-03-11 | Stop reason: HOSPADM

## 2025-03-04 RX ADMIN — SUCRALFATE 1 G: 1 TABLET ORAL at 20:05

## 2025-03-04 RX ADMIN — KIT FOR THE PREPARATION OF TECHNETIUM TC 99M PENTETATE 35 MILLICURIE: 20 INJECTION, POWDER, LYOPHILIZED, FOR SOLUTION INTRAVENOUS; RESPIRATORY (INHALATION) at 11:54

## 2025-03-04 RX ADMIN — MULTIVITAMIN TABLET 1 TABLET: TABLET at 12:19

## 2025-03-04 RX ADMIN — MIDODRINE HYDROCHLORIDE 10 MG: 5 TABLET ORAL at 17:23

## 2025-03-04 RX ADMIN — SUCRALFATE 1 G: 1 TABLET ORAL at 12:20

## 2025-03-04 RX ADMIN — OXYCODONE HYDROCHLORIDE 5 MG: 5 TABLET ORAL at 20:05

## 2025-03-04 RX ADMIN — HYDROMORPHONE HYDROCHLORIDE 0.5 MG: 1 INJECTION, SOLUTION INTRAMUSCULAR; INTRAVENOUS; SUBCUTANEOUS at 23:44

## 2025-03-04 RX ADMIN — CALCIUM ACETATE 667 MG: 667 CAPSULE ORAL at 17:23

## 2025-03-04 RX ADMIN — DIPHENHYDRAMINE HYDROCHLORIDE 25 MG: 25 TABLET ORAL at 20:11

## 2025-03-04 RX ADMIN — INSULIN LISPRO 4 UNITS: 100 INJECTION, SOLUTION INTRAVENOUS; SUBCUTANEOUS at 17:24

## 2025-03-04 RX ADMIN — HYDROMORPHONE HYDROCHLORIDE 0.5 MG: 1 INJECTION, SOLUTION INTRAMUSCULAR; INTRAVENOUS; SUBCUTANEOUS at 12:20

## 2025-03-04 RX ADMIN — SUCRALFATE 1 G: 1 TABLET ORAL at 17:23

## 2025-03-04 RX ADMIN — CALCIUM ACETATE 667 MG: 667 CAPSULE ORAL at 12:19

## 2025-03-04 RX ADMIN — INSULIN LISPRO 4 UNITS: 100 INJECTION, SOLUTION INTRAVENOUS; SUBCUTANEOUS at 20:06

## 2025-03-04 RX ADMIN — SODIUM CHLORIDE, PRESERVATIVE FREE 10 ML: 5 INJECTION INTRAVENOUS at 20:06

## 2025-03-04 RX ADMIN — Medication 6 MILLICURIE: at 11:54

## 2025-03-04 RX ADMIN — SODIUM CHLORIDE, PRESERVATIVE FREE 10 ML: 5 INJECTION INTRAVENOUS at 12:22

## 2025-03-04 RX ADMIN — MIDODRINE HYDROCHLORIDE 10 MG: 5 TABLET ORAL at 12:18

## 2025-03-04 RX ADMIN — INSULIN LISPRO 4 UNITS: 100 INJECTION, SOLUTION INTRAVENOUS; SUBCUTANEOUS at 12:22

## 2025-03-04 RX ADMIN — INSULIN GLARGINE 8 UNITS: 100 INJECTION, SOLUTION SUBCUTANEOUS at 20:05

## 2025-03-04 RX ADMIN — ACETAMINOPHEN 650 MG: 325 TABLET ORAL at 09:25

## 2025-03-04 RX ADMIN — PANTOPRAZOLE SODIUM 40 MG: 40 INJECTION, POWDER, FOR SOLUTION INTRAVENOUS at 20:06

## 2025-03-04 RX ADMIN — PANTOPRAZOLE SODIUM 40 MG: 40 INJECTION, POWDER, FOR SOLUTION INTRAVENOUS at 12:18

## 2025-03-04 ASSESSMENT — PAIN SCALES - GENERAL
PAINLEVEL_OUTOF10: 3
PAINLEVEL_OUTOF10: 7
PAINLEVEL_OUTOF10: 8
PAINLEVEL_OUTOF10: 8

## 2025-03-04 ASSESSMENT — PAIN DESCRIPTION - LOCATION
LOCATION: LEG
LOCATION: ANKLE;LEG
LOCATION: ANKLE;LEG
LOCATION: LEG

## 2025-03-04 ASSESSMENT — PAIN DESCRIPTION - DESCRIPTORS
DESCRIPTORS: ACHING;DISCOMFORT;TENDER;SORE
DESCRIPTORS: ACHING
DESCRIPTORS: ACHING;DISCOMFORT;THROBBING

## 2025-03-04 ASSESSMENT — PAIN DESCRIPTION - ORIENTATION
ORIENTATION: LEFT
ORIENTATION: LEFT;LOWER
ORIENTATION: MID;LEFT;LOWER
ORIENTATION: LEFT

## 2025-03-04 ASSESSMENT — PAIN - FUNCTIONAL ASSESSMENT: PAIN_FUNCTIONAL_ASSESSMENT: PREVENTS OR INTERFERES WITH MANY ACTIVE NOT PASSIVE ACTIVITIES

## 2025-03-04 NOTE — FLOWSHEET NOTE
03/04/25 1039   Vital Signs   BP (!) 164/70   Temp 98.5 °F (36.9 °C)   Pulse (!) 105   Respirations 16   Weight - Scale 71.6 kg (157 lb 13.6 oz)   Weight Method Bed scale   Percent Weight Change 0   Post-Hemodialysis Assessment   Post-Treatment Procedures Blood returned;Access bleeding time < 10 minutes   Machine Disinfection Process Exterior Machine Disinfection   Rinseback Volume (ml) 300 ml   Blood Volume Processed (Liters) 90.7 L   Dialyzer Clearance Lightly streaked   Duration of Treatment (minutes) 240 minutes   Heparin Amount Administered During Treatment (mL) 0 mL   Hemodialysis Intake (ml) 300 ml   Hemodialysis Output (ml) 1493 ml   NET Removed (ml) 1193   Tolerated Treatment Good   Patient Response to Treatment tolerated well, blood returned, needles pulled sites held stasis achieved, bandaids applied, +thrill, +bruit

## 2025-03-04 NOTE — CONSENT
Informed Consent for Blood Component Transfusion Note    I have discussed with the patient the rationale for blood component transfusion; its benefits in treating or preventing fatigue, organ damage, or death; and its risk which includes mild transfusion reactions, rare risk of blood borne infection, or more serious but rare reactions. I have discussed the alternatives to transfusion, including the risk and consequences of not receiving transfusion. The patient had an opportunity to ask questions and had agreed to proceed with transfusion of blood components.    Electronically signed by KEMI Barrientos CNP on 3/4/25 at 12:40 AM EST

## 2025-03-04 NOTE — SIGNIFICANT EVENT
General Surgery Attrending Note    Plan for EGD 3/5.  Patient needs to be 24hr post HD.      Cornelius Owen MD

## 2025-03-04 NOTE — TELEPHONE ENCOUNTER
Received a call from [facility] Department. Patient is scheduled for Left Ankle surgery with Dr. Germain on 3-6-2025. She was admitted to [facility] on 3-2-2025 due to N/V and Diarrhea since 2-. Currently still hospitalized.     [facility] Department was notifying our office.

## 2025-03-04 NOTE — PLAN OF CARE
Problem: Chronic Conditions and Co-morbidities  Goal: Patient's chronic conditions and co-morbidity symptoms are monitored and maintained or improved  Outcome: Progressing     Problem: Pain  Goal: Verbalizes/displays adequate comfort level or baseline comfort level  Outcome: Progressing     Problem: Safety - Adult  Goal: Free from fall injury  Outcome: Progressing     Problem: ABCDS Injury Assessment  Goal: Absence of physical injury  Outcome: Progressing     Problem: Skin/Tissue Integrity  Goal: Skin integrity remains intact  Description: 1.  Monitor for areas of redness and/or skin breakdown  2.  Assess vascular access sites hourly  3.  Every 4-6 hours minimum:  Change oxygen saturation probe site  4.  Every 4-6 hours:  If on nasal continuous positive airway pressure, respiratory therapy assess nares and determine need for appliance change or resting period  Outcome: Progressing

## 2025-03-05 LAB
ABO/RH: NORMAL
ALBUMIN SERPL-MCNC: 3.5 G/DL (ref 3.5–5.2)
ALP SERPL-CCNC: 86 U/L (ref 35–104)
ALT SERPL-CCNC: 7 U/L (ref 0–32)
ANION GAP SERPL CALCULATED.3IONS-SCNC: 12 MMOL/L (ref 7–16)
ANTIBODY SCREEN: NEGATIVE
ARM BAND NUMBER: NORMAL
AST SERPL-CCNC: 19 U/L (ref 0–31)
BASOPHILS # BLD: 0.03 K/UL (ref 0–0.2)
BASOPHILS NFR BLD: 0 % (ref 0–2)
BILIRUB SERPL-MCNC: 0.4 MG/DL (ref 0–1.2)
BLOOD BANK BLOOD PRODUCT EXPIRATION DATE: NORMAL
BLOOD BANK DISPENSE STATUS: NORMAL
BLOOD BANK ISBT PRODUCT BLOOD TYPE: 5100
BLOOD BANK PRODUCT CODE: NORMAL
BLOOD BANK SAMPLE EXPIRATION: NORMAL
BLOOD BANK UNIT TYPE AND RH: NORMAL
BPU ID: NORMAL
BUN SERPL-MCNC: 21 MG/DL (ref 6–20)
CALCIUM SERPL-MCNC: 9 MG/DL (ref 8.6–10.2)
CHLORIDE SERPL-SCNC: 95 MMOL/L (ref 98–107)
CO2 SERPL-SCNC: 25 MMOL/L (ref 22–29)
COMPONENT: NORMAL
CREAT SERPL-MCNC: 4.2 MG/DL (ref 0.5–1)
CROSSMATCH RESULT: NORMAL
EOSINOPHIL # BLD: 0.4 K/UL (ref 0.05–0.5)
EOSINOPHILS RELATIVE PERCENT: 5 % (ref 0–6)
ERYTHROCYTE [DISTWIDTH] IN BLOOD BY AUTOMATED COUNT: 14.6 % (ref 11.5–15)
ERYTHROCYTE [DISTWIDTH] IN BLOOD BY AUTOMATED COUNT: NORMAL % (ref 11.8–14.4)
GFR, ESTIMATED: 13 ML/MIN/1.73M2
GLUCOSE BLD-MCNC: 155 MG/DL (ref 74–99)
GLUCOSE BLD-MCNC: 169 MG/DL (ref 74–99)
GLUCOSE BLD-MCNC: 280 MG/DL (ref 74–99)
GLUCOSE BLD-MCNC: 295 MG/DL (ref 74–99)
GLUCOSE SERPL-MCNC: 172 MG/DL (ref 74–99)
HCT VFR BLD AUTO: 24 % (ref 34–48)
HCT VFR BLD AUTO: NORMAL % (ref 36.3–47.1)
HGB BLD-MCNC: 8.1 G/DL (ref 11.5–15.5)
HGB BLD-MCNC: NORMAL G/DL (ref 11.9–15.1)
IMM GRANULOCYTES # BLD AUTO: 0.03 K/UL (ref 0–0.58)
IMM GRANULOCYTES NFR BLD: 0 % (ref 0–5)
LYMPHOCYTES NFR BLD: 1.27 K/UL (ref 1.5–4)
LYMPHOCYTES RELATIVE PERCENT: 17 % (ref 20–42)
MAGNESIUM SERPL-MCNC: 2.1 MG/DL (ref 1.6–2.6)
MCH RBC QN AUTO: 29.5 PG (ref 26–35)
MCH RBC QN AUTO: NORMAL PG (ref 25.2–33.5)
MCHC RBC AUTO-ENTMCNC: 33.8 G/DL (ref 32–34.5)
MCHC RBC AUTO-ENTMCNC: NORMAL G/DL (ref 28.4–34.8)
MCV RBC AUTO: 87.3 FL (ref 80–99.9)
MCV RBC AUTO: NORMAL FL (ref 82.6–102.9)
MONOCYTES NFR BLD: 0.63 K/UL (ref 0.1–0.95)
MONOCYTES NFR BLD: 8 % (ref 2–12)
NEUTROPHILS NFR BLD: 69 % (ref 43–80)
NEUTS SEG NFR BLD: 5.32 K/UL (ref 1.8–7.3)
NRBC BLD-RTO: NORMAL PER 100 WBC
PHOSPHATE SERPL-MCNC: 2.6 MG/DL (ref 2.5–4.5)
PLATELET # BLD AUTO: 219 K/UL (ref 130–450)
PLATELET # BLD AUTO: NORMAL K/UL (ref 138–453)
PLATELET, FLUORESCENCE: NORMAL K/UL (ref 138–453)
PLATELETS.RETICULATED NFR BLD AUTO: NORMAL % (ref 1.1–10.3)
PMV BLD AUTO: 9.4 FL (ref 7–12)
PMV BLD AUTO: NORMAL FL (ref 8.1–13.5)
POTASSIUM SERPL-SCNC: 3.6 MMOL/L (ref 3.5–5)
PROT SERPL-MCNC: 6.4 G/DL (ref 6.4–8.3)
RBC # BLD AUTO: 2.75 M/UL (ref 3.5–5.5)
RBC # BLD AUTO: NORMAL M/UL (ref 3.95–5.11)
SODIUM SERPL-SCNC: 132 MMOL/L (ref 132–146)
TRANSFUSION STATUS: NORMAL
UNIT DIVISION: 0
UNIT ISSUE DATE/TIME: NORMAL
WBC OTHER # BLD: 7.7 K/UL (ref 4.5–11.5)
WBC OTHER # BLD: NORMAL K/UL (ref 3.5–11.3)

## 2025-03-05 PROCEDURE — 2700000000 HC OXYGEN THERAPY PER DAY

## 2025-03-05 PROCEDURE — 82962 GLUCOSE BLOOD TEST: CPT

## 2025-03-05 PROCEDURE — 6360000002 HC RX W HCPCS: Performed by: STUDENT IN AN ORGANIZED HEALTH CARE EDUCATION/TRAINING PROGRAM

## 2025-03-05 PROCEDURE — 84100 ASSAY OF PHOSPHORUS: CPT

## 2025-03-05 PROCEDURE — 36415 COLL VENOUS BLD VENIPUNCTURE: CPT

## 2025-03-05 PROCEDURE — 90935 HEMODIALYSIS ONE EVALUATION: CPT

## 2025-03-05 PROCEDURE — 7100000000 HC PACU RECOVERY - FIRST 15 MIN: Performed by: STUDENT IN AN ORGANIZED HEALTH CARE EDUCATION/TRAINING PROGRAM

## 2025-03-05 PROCEDURE — 6370000000 HC RX 637 (ALT 250 FOR IP): Performed by: INTERNAL MEDICINE

## 2025-03-05 PROCEDURE — 0DJ08ZZ INSPECTION OF UPPER INTESTINAL TRACT, VIA NATURAL OR ARTIFICIAL OPENING ENDOSCOPIC: ICD-10-PCS | Performed by: INTERNAL MEDICINE

## 2025-03-05 PROCEDURE — 6370000000 HC RX 637 (ALT 250 FOR IP): Performed by: STUDENT IN AN ORGANIZED HEALTH CARE EDUCATION/TRAINING PROGRAM

## 2025-03-05 PROCEDURE — 43235 EGD DIAGNOSTIC BRUSH WASH: CPT | Performed by: STUDENT IN AN ORGANIZED HEALTH CARE EDUCATION/TRAINING PROGRAM

## 2025-03-05 PROCEDURE — 2709999900 HC NON-CHARGEABLE SUPPLY: Performed by: STUDENT IN AN ORGANIZED HEALTH CARE EDUCATION/TRAINING PROGRAM

## 2025-03-05 PROCEDURE — 83735 ASSAY OF MAGNESIUM: CPT

## 2025-03-05 PROCEDURE — 3609017100 HC EGD: Performed by: STUDENT IN AN ORGANIZED HEALTH CARE EDUCATION/TRAINING PROGRAM

## 2025-03-05 PROCEDURE — 7100000001 HC PACU RECOVERY - ADDTL 15 MIN: Performed by: STUDENT IN AN ORGANIZED HEALTH CARE EDUCATION/TRAINING PROGRAM

## 2025-03-05 PROCEDURE — 80053 COMPREHEN METABOLIC PANEL: CPT

## 2025-03-05 PROCEDURE — 2500000003 HC RX 250 WO HCPCS: Performed by: STUDENT IN AN ORGANIZED HEALTH CARE EDUCATION/TRAINING PROGRAM

## 2025-03-05 PROCEDURE — 99232 SBSQ HOSP IP/OBS MODERATE 35: CPT | Performed by: INTERNAL MEDICINE

## 2025-03-05 PROCEDURE — 2060000000 HC ICU INTERMEDIATE R&B

## 2025-03-05 PROCEDURE — 85025 COMPLETE CBC W/AUTO DIFF WBC: CPT

## 2025-03-05 RX ORDER — MIDAZOLAM HYDROCHLORIDE 1 MG/ML
INJECTION, SOLUTION INTRAMUSCULAR; INTRAVENOUS PRN
Status: DISCONTINUED | OUTPATIENT
Start: 2025-03-05 | End: 2025-03-05 | Stop reason: ALTCHOICE

## 2025-03-05 RX ORDER — FENTANYL CITRATE 50 UG/ML
INJECTION, SOLUTION INTRAMUSCULAR; INTRAVENOUS PRN
Status: DISCONTINUED | OUTPATIENT
Start: 2025-03-05 | End: 2025-03-05 | Stop reason: ALTCHOICE

## 2025-03-05 RX ADMIN — CALCIUM ACETATE 667 MG: 667 CAPSULE ORAL at 17:48

## 2025-03-05 RX ADMIN — HYDROMORPHONE HYDROCHLORIDE 0.5 MG: 1 INJECTION, SOLUTION INTRAMUSCULAR; INTRAVENOUS; SUBCUTANEOUS at 17:48

## 2025-03-05 RX ADMIN — INSULIN LISPRO 4 UNITS: 100 INJECTION, SOLUTION INTRAVENOUS; SUBCUTANEOUS at 17:50

## 2025-03-05 RX ADMIN — SUCRALFATE 1 G: 1 TABLET ORAL at 20:35

## 2025-03-05 RX ADMIN — SUCRALFATE 1 G: 1 TABLET ORAL at 17:48

## 2025-03-05 RX ADMIN — SUCRALFATE 1 G: 1 TABLET ORAL at 05:48

## 2025-03-05 RX ADMIN — SODIUM CHLORIDE, PRESERVATIVE FREE 10 ML: 5 INJECTION INTRAVENOUS at 20:36

## 2025-03-05 RX ADMIN — INSULIN GLARGINE 8 UNITS: 100 INJECTION, SOLUTION SUBCUTANEOUS at 20:37

## 2025-03-05 RX ADMIN — OXYCODONE HYDROCHLORIDE 5 MG: 5 TABLET ORAL at 12:27

## 2025-03-05 RX ADMIN — PANTOPRAZOLE SODIUM 40 MG: 40 INJECTION, POWDER, FOR SOLUTION INTRAVENOUS at 20:35

## 2025-03-05 RX ADMIN — INSULIN LISPRO 4 UNITS: 100 INJECTION, SOLUTION INTRAVENOUS; SUBCUTANEOUS at 20:36

## 2025-03-05 RX ADMIN — INSULIN LISPRO 4 UNITS: 100 INJECTION, SOLUTION INTRAVENOUS; SUBCUTANEOUS at 17:47

## 2025-03-05 RX ADMIN — OXYCODONE HYDROCHLORIDE 5 MG: 5 TABLET ORAL at 05:48

## 2025-03-05 RX ADMIN — DIPHENHYDRAMINE HYDROCHLORIDE 25 MG: 25 TABLET ORAL at 20:35

## 2025-03-05 RX ADMIN — HYDROMORPHONE HYDROCHLORIDE 0.5 MG: 1 INJECTION, SOLUTION INTRAMUSCULAR; INTRAVENOUS; SUBCUTANEOUS at 22:34

## 2025-03-05 RX ADMIN — OXYCODONE HYDROCHLORIDE 5 MG: 5 TABLET ORAL at 20:35

## 2025-03-05 ASSESSMENT — PAIN DESCRIPTION - LOCATION
LOCATION: ANKLE
LOCATION: ANKLE
LOCATION: ANKLE;BACK;LEG
LOCATION: ANKLE

## 2025-03-05 ASSESSMENT — PAIN DESCRIPTION - ORIENTATION
ORIENTATION: LEFT

## 2025-03-05 ASSESSMENT — PAIN DESCRIPTION - DESCRIPTORS
DESCRIPTORS: NUMBNESS;SHARP;THROBBING
DESCRIPTORS: NUMBNESS;SHARP;THROBBING
DESCRIPTORS: ACHING;DISCOMFORT
DESCRIPTORS: ACHING;DISCOMFORT

## 2025-03-05 ASSESSMENT — PAIN SCALES - GENERAL
PAINLEVEL_OUTOF10: 8
PAINLEVEL_OUTOF10: 8
PAINLEVEL_OUTOF10: 6
PAINLEVEL_OUTOF10: 8
PAINLEVEL_OUTOF10: 7
PAINLEVEL_OUTOF10: 4
PAINLEVEL_OUTOF10: 8

## 2025-03-05 ASSESSMENT — PAIN - FUNCTIONAL ASSESSMENT
PAIN_FUNCTIONAL_ASSESSMENT: PREVENTS OR INTERFERES SOME ACTIVE ACTIVITIES AND ADLS
PAIN_FUNCTIONAL_ASSESSMENT: NONE - DENIES PAIN

## 2025-03-05 NOTE — FLOWSHEET NOTE
03/05/25 1655   Vital Signs   BP (!) 159/73   Temp 96.9 °F (36.1 °C)   Pulse (!) 109   Respirations 18   Weight - Scale   (floor to weigh)   Post-Hemodialysis Assessment   Post-Treatment Procedures Blood returned;Access bleeding time < 10 minutes   Machine Disinfection Process Exterior Machine Disinfection;Heat Disinfect;Acid/Vinegar Clean   Rinseback Volume (ml) 300 ml   Blood Volume Processed (Liters) 89.2 L   Dialyzer Clearance Lightly streaked   Duration of Treatment (minutes) 240 minutes   Heparin Amount Administered During Treatment (mL) 0 mL   Hemodialysis Intake (ml) 300 ml   Hemodialysis Output (ml) 2800 ml   NET Removed (ml) 2500   Tolerated Treatment Good   Patient Response to Treatment tolerated well   Bilateral Breath Sounds Clear   RUE Edema +1   LUE Edema +1   RLE Edema +1   LLE Edema +1   Physician Notified No   Time Off 1643   Patient Disposition Return to room   Observations & Evaluations   Level of Consciousness 0   Oriented X 3

## 2025-03-05 NOTE — PLAN OF CARE
Problem: Chronic Conditions and Co-morbidities  Goal: Patient's chronic conditions and co-morbidity symptoms are monitored and maintained or improved  3/5/2025 0307 by Janice Carmona RN  Outcome: Progressing  3/4/2025 1855 by Alyssa Yin RN  Outcome: Progressing     Problem: Discharge Planning  Goal: Discharge to home or other facility with appropriate resources  Outcome: Progressing     Problem: Pain  Goal: Verbalizes/displays adequate comfort level or baseline comfort level  3/5/2025 0307 by Janice Carmona RN  Outcome: Progressing  3/4/2025 1855 by Alyssa Yin RN  Outcome: Progressing     Problem: Safety - Adult  Goal: Free from fall injury  3/5/2025 0307 by Janice Carmona RN  Outcome: Progressing  3/4/2025 1855 by Alyssa Yin RN  Outcome: Progressing     Problem: ABCDS Injury Assessment  Goal: Absence of physical injury  3/5/2025 0307 by Janice Carmona RN  Outcome: Progressing  3/4/2025 1855 by Alyssa Yin RN  Outcome: Progressing     Problem: Skin/Tissue Integrity  Goal: Skin integrity remains intact  Description: 1.  Monitor for areas of redness and/or skin breakdown  2.  Assess vascular access sites hourly  3.  Every 4-6 hours minimum:  Change oxygen saturation probe site  4.  Every 4-6 hours:  If on nasal continuous positive airway pressure, respiratory therapy assess nares and determine need for appliance change or resting period  3/5/2025 0307 by Janice Carmona RN  Outcome: Progressing  3/4/2025 1855 by Alyssa Yin RN  Outcome: Progressing

## 2025-03-05 NOTE — PLAN OF CARE
Pre-Operative Risk assessment using 2014 ACC/AHA guidelines     Emergent procedure No  Active Cardiac Condition No (decompensated HF, Arrhythmia, MI <3 weeks, severe valve disease)  Risk Level of Procedure Intermediate Risk (intraperitoneal, intrathoracic, HENT, orthopedic, or carotid endarterectomy, etc.)  Revised Cardiac Risk Index Risk factors: CKD Cr >2.0 mg/dL  Measurement of Exercise Tolerance before Surgery >4 No    According to the 2014 ACC/AHA pre-operative risk assessment guidelines Michelle Nettles is a low risk for major cardiac complications during a intermediate risk procedure and may continue as planned.    Electronically signed by Elias Guzmán MD on 3/5/2025 at 4:36 PM

## 2025-03-05 NOTE — CONSULTS
GENERAL SURGERY  CONSULT NOTE    Patient's Name/Date of Birth: Michelle Nettles / 1985    Date: March 3, 2025     PCP: Rogers Rodríguez MD     Chief Complaint:   Chief Complaint   Patient presents with    Nausea    Vomiting    Diarrhea     N/V/D since Thursday. Ankle sx on Thursday. Has been sick since then. From [facility]. Non weight bearing to surgical ankle. Baseline is 5 L NC.        Physician Consulted: Dr. Owen  Reason for Consult: Anemia  Referring Physician: Dr. Opal SLAUGHTER  Michelle Nettles is a 39 y.o. female presenting from her nursing facility secondary to nausea, vomiting, diarrhea for the past several days.  Patient states that she has not been able to tolerate a diet very well and has had poor appetite.  Patient has a recent history of a trimalleolar ankle fracture that underwent external fixation on 2/15.  She is scheduled for removal of Ex-Fix and ORIF on 3/6 with Dr. Germain.  Patient reports that she was having dark red-colored stools but that this is not abnormal since she is on iron.  She denies hematemesis.  She reports that she has some vague diffuse abdominal pain but no worsening abdominal pain currently.  She denies fevers or chills.  Patient is well-known to the general surgery service and has seen Dr. Gonzalez in the past secondary to her anemia.  She reports that she has was to follow-up in office within this week but had to cancel secondary to her illness.    Patient's most recent EGD was completed in January 2025.  Patient found to have gastritis and biopsies were negative for H. pylori or any pathology.  Colonoscopy last completed in 2020 additionally with no significant findings. Patient does take Plavix regularly.     General surgery has been consulted for concern for melena and anemia with hemoglobin of 5.2 upon arrival to the ED.  Patient is a dialysis patient and receives dialysis 3 times weekly.  Patient went for dialysis this morning upon 
[facility]  Nephrology Progress Note    Patient's Name: Michelle eNttles    History of Present Illness from 2/15 consult note:    \"Michelle Nettles is a 39 y.o. female with a past medical history of ESRD, diabetes mellitus, and hypertension.  She presented to the [place] ED on 2/14 reportedly for concerns of syncope/fall.  Vital signs on 2/14 includes temperature 98.2, respirations 24, pulse 102, /73, and she was 99% SpO2.  Lab data on 2/14 includes potassium 3.1, CO2 31, BUN 28, creatinine 7.6, glucose 264, and hemoglobin 7.6.  She had a CT head on 2/14 which showed no evidence of an acute or subacute intracranial normality.  XR left ankle 2/14 showed acute trimalleolar fracture of the left ankle.  Chest x-ray from 2/14 showed no acute cardiopulmonary disease.  She reportedly transferred to [facility] on 2/15 and was seen by orthopedic surgery.  She underwent application of multiplanar external fixator LLE, closed treatment left ankle fracture/ dislocation 2/15.  Nephrology has been consulted to see the patient for ESRD on HD.  Patient is known to our service and dialyzes as an outpatient at [facility] via left arm AV fistula.  At present, patient was seen and examined.  She notes that she was standing up coughing and woke up on the floor.  She denies any dizziness.  She denies any chest pain.  She denies any nausea, vomiting, or diarrhea.  Her appetite has been good.\"  HPI from 3/3:  A full consult is deferred as patient was followed by our service while inpatient recently.  Briefly, she presented to the ED on 3/2 reportedly for concerns of nausea, vomiting, and diarrhea.  Vital signs on 3/2 includes Temperature 97.8, respirations 16, pulse 105, /75, and she was 100% SpO2.  Lab data on 3/2 includes sodium 131, potassium 6.7 (hemolyzed), BUN 82, creatinine 10.4, glucose 133, and hemoglobin 5.2.  She was COVID/influenza negative.  CT abdomen/pelvis from 
carvedilol (COREG) tablet 25 mg, 25 mg, Oral, QAM  [Held by provider] clopidogrel (PLAVIX) tablet 75 mg, 75 mg, Oral, Daily  diphenhydrAMINE (BENADRYL) tablet 25 mg, 25 mg, Oral, Q6H PRN  insulin glargine (LANTUS) injection vial 8 Units, 8 Units, SubCUTAneous, Nightly  insulin lispro (HUMALOG,ADMELOG) injection vial 4 Units, 4 Units, SubCUTAneous, TID WC  multivitamin 1 tablet, 1 tablet, Oral, Daily  sucralfate (CARAFATE) tablet 1 g, 1 g, Oral, 4x Daily AC & HS  pantoprazole (PROTONIX) injection 40 mg, 40 mg, IntraVENous, BID  glucose chewable tablet 16 g, 4 tablet, Oral, PRN  dextrose bolus 10% 125 mL, 125 mL, IntraVENous, PRN **OR** dextrose bolus 10% 250 mL, 250 mL, IntraVENous, PRN  glucagon injection 1 mg, 1 mg, SubCUTAneous, PRN  dextrose 10 % infusion, , IntraVENous, Continuous PRN  Allergies:  Vancomycin    Social History:   TOBACCO:   reports that she has been smoking cigarettes. She started smoking about 25 years ago. She has a 12.9 pack-year smoking history. She has been exposed to tobacco smoke. She has never used smokeless tobacco.  ETOH:   reports that she does not currently use alcohol.  DRUGS:   reports that she does not currently use drugs after having used the following drugs: Marijuana (Weed).  Family History:       Problem Relation Age of Onset    Stroke Mother     Cancer Father     Diabetes Paternal Aunt        REVIEW OF SYSTEMS:  CONSTITUTIONAL:  negative for  fevers, chills  EYES:  negative for blurred vision, visual disturbance  HEENT:  negative for  hearing loss, voice change  RESPIRATORY:  negative for  dyspnea, wheezing  CARDIOVASCULAR:  negative for  chest pain, palpitations  GASTROINTESTINAL:  negative for nausea, vomiting  GENITOURINARY:  negative for frequency, urinary incontinence  HEMATOLOGIC/LYMPHATIC:  negative for bleeding and petechiae  MUSCULOSKELETAL:  positive for  pain, joint swelling, and decreased range of motion  NEUROLOGICAL:  negative for headaches,

## 2025-03-06 ENCOUNTER — ANESTHESIA (OUTPATIENT)
Dept: OPERATING ROOM | Age: 40
End: 2025-03-06
Payer: MEDICARE

## 2025-03-06 ENCOUNTER — ANESTHESIA EVENT (OUTPATIENT)
Dept: OPERATING ROOM | Age: 40
End: 2025-03-06
Payer: MEDICARE

## 2025-03-06 LAB
ABO + RH BLD: NORMAL
ALBUMIN SERPL-MCNC: 3.2 G/DL (ref 3.5–5.2)
ALP SERPL-CCNC: 84 U/L (ref 35–104)
ALT SERPL-CCNC: 6 U/L (ref 0–32)
ANION GAP SERPL CALCULATED.3IONS-SCNC: 14 MMOL/L (ref 7–16)
ARM BAND NUMBER: NORMAL
AST SERPL-CCNC: 17 U/L (ref 0–31)
BILIRUB SERPL-MCNC: 0.3 MG/DL (ref 0–1.2)
BLOOD BANK SAMPLE EXPIRATION: NORMAL
BLOOD GROUP ANTIBODIES SERPL: NEGATIVE
BUN SERPL-MCNC: 13 MG/DL (ref 6–20)
CALCIUM SERPL-MCNC: 8.7 MG/DL (ref 8.6–10.2)
CHLORIDE SERPL-SCNC: 97 MMOL/L (ref 98–107)
CO2 SERPL-SCNC: 23 MMOL/L (ref 22–29)
CREAT SERPL-MCNC: 4.1 MG/DL (ref 0.5–1)
ERYTHROCYTE [DISTWIDTH] IN BLOOD BY AUTOMATED COUNT: 14.2 % (ref 11.5–15)
ERYTHROCYTE [DISTWIDTH] IN BLOOD BY AUTOMATED COUNT: 14.2 % (ref 11.5–15)
GFR, ESTIMATED: 14 ML/MIN/1.73M2
GLUCOSE BLD-MCNC: 165 MG/DL (ref 74–99)
GLUCOSE BLD-MCNC: 233 MG/DL (ref 74–99)
GLUCOSE BLD-MCNC: 282 MG/DL (ref 74–99)
GLUCOSE BLD-MCNC: 316 MG/DL (ref 74–99)
GLUCOSE SERPL-MCNC: 190 MG/DL (ref 74–99)
HCT VFR BLD AUTO: 21.9 % (ref 34–48)
HCT VFR BLD AUTO: 22 % (ref 34–48)
HGB BLD-MCNC: 7.5 G/DL (ref 11.5–15.5)
HGB BLD-MCNC: 7.6 G/DL (ref 11.5–15.5)
MAGNESIUM SERPL-MCNC: 1.9 MG/DL (ref 1.6–2.6)
MCH RBC QN AUTO: 29.6 PG (ref 26–35)
MCH RBC QN AUTO: 29.6 PG (ref 26–35)
MCHC RBC AUTO-ENTMCNC: 34.2 G/DL (ref 32–34.5)
MCHC RBC AUTO-ENTMCNC: 34.5 G/DL (ref 32–34.5)
MCV RBC AUTO: 85.6 FL (ref 80–99.9)
MCV RBC AUTO: 86.6 FL (ref 80–99.9)
PHOSPHATE SERPL-MCNC: 2.4 MG/DL (ref 2.5–4.5)
PLATELET # BLD AUTO: 214 K/UL (ref 130–450)
PLATELET # BLD AUTO: 223 K/UL (ref 130–450)
PMV BLD AUTO: 9.5 FL (ref 7–12)
PMV BLD AUTO: 9.6 FL (ref 7–12)
POTASSIUM SERPL-SCNC: 3.7 MMOL/L (ref 3.5–5)
PROT SERPL-MCNC: 6.1 G/DL (ref 6.4–8.3)
RBC # BLD AUTO: 2.53 M/UL (ref 3.5–5.5)
RBC # BLD AUTO: 2.57 M/UL (ref 3.5–5.5)
SODIUM SERPL-SCNC: 134 MMOL/L (ref 132–146)
WBC OTHER # BLD: 5.8 K/UL (ref 4.5–11.5)
WBC OTHER # BLD: 6.5 K/UL (ref 4.5–11.5)

## 2025-03-06 PROCEDURE — 82962 GLUCOSE BLOOD TEST: CPT

## 2025-03-06 PROCEDURE — 2500000003 HC RX 250 WO HCPCS: Performed by: STUDENT IN AN ORGANIZED HEALTH CARE EDUCATION/TRAINING PROGRAM

## 2025-03-06 PROCEDURE — 6370000000 HC RX 637 (ALT 250 FOR IP): Performed by: STUDENT IN AN ORGANIZED HEALTH CARE EDUCATION/TRAINING PROGRAM

## 2025-03-06 PROCEDURE — 2700000000 HC OXYGEN THERAPY PER DAY

## 2025-03-06 PROCEDURE — 6370000000 HC RX 637 (ALT 250 FOR IP): Performed by: INTERNAL MEDICINE

## 2025-03-06 PROCEDURE — 86900 BLOOD TYPING SEROLOGIC ABO: CPT

## 2025-03-06 PROCEDURE — 99232 SBSQ HOSP IP/OBS MODERATE 35: CPT | Performed by: INTERNAL MEDICINE

## 2025-03-06 PROCEDURE — 85027 COMPLETE CBC AUTOMATED: CPT

## 2025-03-06 PROCEDURE — 36415 COLL VENOUS BLD VENIPUNCTURE: CPT

## 2025-03-06 PROCEDURE — 86901 BLOOD TYPING SEROLOGIC RH(D): CPT

## 2025-03-06 PROCEDURE — 6360000002 HC RX W HCPCS: Performed by: STUDENT IN AN ORGANIZED HEALTH CARE EDUCATION/TRAINING PROGRAM

## 2025-03-06 PROCEDURE — 83735 ASSAY OF MAGNESIUM: CPT

## 2025-03-06 PROCEDURE — 99232 SBSQ HOSP IP/OBS MODERATE 35: CPT | Performed by: STUDENT IN AN ORGANIZED HEALTH CARE EDUCATION/TRAINING PROGRAM

## 2025-03-06 PROCEDURE — 80053 COMPREHEN METABOLIC PANEL: CPT

## 2025-03-06 PROCEDURE — 84100 ASSAY OF PHOSPHORUS: CPT

## 2025-03-06 PROCEDURE — 86850 RBC ANTIBODY SCREEN: CPT

## 2025-03-06 PROCEDURE — 2060000000 HC ICU INTERMEDIATE R&B

## 2025-03-06 RX ORDER — HEPARIN SODIUM 5000 [USP'U]/ML
5000 INJECTION, SOLUTION INTRAVENOUS; SUBCUTANEOUS EVERY 8 HOURS
Status: DISCONTINUED | OUTPATIENT
Start: 2025-03-06 | End: 2025-03-11 | Stop reason: HOSPADM

## 2025-03-06 RX ADMIN — PANTOPRAZOLE SODIUM 40 MG: 40 INJECTION, POWDER, FOR SOLUTION INTRAVENOUS at 21:13

## 2025-03-06 RX ADMIN — INSULIN LISPRO 6 UNITS: 100 INJECTION, SOLUTION INTRAVENOUS; SUBCUTANEOUS at 17:36

## 2025-03-06 RX ADMIN — INSULIN LISPRO 4 UNITS: 100 INJECTION, SOLUTION INTRAVENOUS; SUBCUTANEOUS at 17:36

## 2025-03-06 RX ADMIN — SUCRALFATE 1 G: 1 TABLET ORAL at 05:15

## 2025-03-06 RX ADMIN — INSULIN GLARGINE 8 UNITS: 100 INJECTION, SOLUTION SUBCUTANEOUS at 21:13

## 2025-03-06 RX ADMIN — SUCRALFATE 1 G: 1 TABLET ORAL at 17:35

## 2025-03-06 RX ADMIN — OXYCODONE HYDROCHLORIDE 5 MG: 5 TABLET ORAL at 21:12

## 2025-03-06 RX ADMIN — ONDANSETRON 4 MG: 2 INJECTION, SOLUTION INTRAMUSCULAR; INTRAVENOUS at 08:00

## 2025-03-06 RX ADMIN — CARVEDILOL 25 MG: 25 TABLET, FILM COATED ORAL at 17:37

## 2025-03-06 RX ADMIN — PANTOPRAZOLE SODIUM 40 MG: 40 INJECTION, POWDER, FOR SOLUTION INTRAVENOUS at 08:00

## 2025-03-06 RX ADMIN — SODIUM CHLORIDE, PRESERVATIVE FREE 10 ML: 5 INJECTION INTRAVENOUS at 08:01

## 2025-03-06 RX ADMIN — SUCRALFATE 1 G: 1 TABLET ORAL at 21:13

## 2025-03-06 RX ADMIN — OXYCODONE HYDROCHLORIDE 5 MG: 5 TABLET ORAL at 05:14

## 2025-03-06 RX ADMIN — SODIUM CHLORIDE, PRESERVATIVE FREE 10 ML: 5 INJECTION INTRAVENOUS at 08:02

## 2025-03-06 RX ADMIN — MULTIVITAMIN TABLET 1 TABLET: TABLET at 08:01

## 2025-03-06 RX ADMIN — MIDODRINE HYDROCHLORIDE 10 MG: 5 TABLET ORAL at 08:00

## 2025-03-06 RX ADMIN — INSULIN LISPRO 4 UNITS: 100 INJECTION, SOLUTION INTRAVENOUS; SUBCUTANEOUS at 21:13

## 2025-03-06 RX ADMIN — HYDROMORPHONE HYDROCHLORIDE 0.5 MG: 1 INJECTION, SOLUTION INTRAMUSCULAR; INTRAVENOUS; SUBCUTANEOUS at 13:38

## 2025-03-06 RX ADMIN — Medication 3 MG: at 21:13

## 2025-03-06 RX ADMIN — HYDROMORPHONE HYDROCHLORIDE 0.5 MG: 1 INJECTION, SOLUTION INTRAMUSCULAR; INTRAVENOUS; SUBCUTANEOUS at 22:58

## 2025-03-06 RX ADMIN — CALCIUM ACETATE 667 MG: 667 CAPSULE ORAL at 17:35

## 2025-03-06 RX ADMIN — DIPHENHYDRAMINE HYDROCHLORIDE 25 MG: 25 TABLET ORAL at 21:13

## 2025-03-06 ASSESSMENT — PAIN DESCRIPTION - ORIENTATION
ORIENTATION: LEFT
ORIENTATION: MID;LOWER
ORIENTATION: LEFT;MID;LOWER

## 2025-03-06 ASSESSMENT — PAIN SCALES - GENERAL
PAINLEVEL_OUTOF10: 8
PAINLEVEL_OUTOF10: 9
PAINLEVEL_OUTOF10: 8
PAINLEVEL_OUTOF10: 6
PAINLEVEL_OUTOF10: 8
PAINLEVEL_OUTOF10: 10
PAINLEVEL_OUTOF10: 7
PAINLEVEL_OUTOF10: 4
PAINLEVEL_OUTOF10: 6

## 2025-03-06 ASSESSMENT — PAIN DESCRIPTION - DESCRIPTORS
DESCRIPTORS: ACHING;TENDER;SORE
DESCRIPTORS: ACHING;TENDER;SORE
DESCRIPTORS: ACHING;DISCOMFORT
DESCRIPTORS: BURNING;ACHING;SHARP;STABBING

## 2025-03-06 ASSESSMENT — PAIN DESCRIPTION - LOCATION
LOCATION: BACK;LEG
LOCATION: LEG
LOCATION: ANKLE;LEG
LOCATION: GENERALIZED
LOCATION: LEG

## 2025-03-06 ASSESSMENT — PAIN SCALES - WONG BAKER: WONGBAKER_NUMERICALRESPONSE: NO HURT

## 2025-03-06 NOTE — PLAN OF CARE
Problem: Chronic Conditions and Co-morbidities  Goal: Patient's chronic conditions and co-morbidity symptoms are monitored and maintained or improved  3/6/2025 0307 by Janice Carmona RN  Outcome: Progressing  3/5/2025 1718 by Shraddha Duke RN  Outcome: Progressing     Problem: Discharge Planning  Goal: Discharge to home or other facility with appropriate resources  3/6/2025 0307 by Janice Carmona RN  Outcome: Progressing  3/5/2025 1718 by Shraddha Duke RN  Outcome: Progressing     Problem: Pain  Goal: Verbalizes/displays adequate comfort level or baseline comfort level  3/6/2025 0307 by Janice Carmona RN  Outcome: Progressing  3/5/2025 1718 by Shraddha Duke RN  Outcome: Progressing     Problem: Safety - Adult  Goal: Free from fall injury  3/6/2025 0307 by Janice Carmona RN  Outcome: Progressing  3/5/2025 1718 by Shraddha Duke RN  Outcome: Progressing     Problem: Skin/Tissue Integrity  Goal: Skin integrity remains intact  Description: 1.  Monitor for areas of redness and/or skin breakdown  2.  Assess vascular access sites hourly  3.  Every 4-6 hours minimum:  Change oxygen saturation probe site  4.  Every 4-6 hours:  If on nasal continuous positive airway pressure, respiratory therapy assess nares and determine need for appliance change or resting period  3/6/2025 0307 by Janice Carmona RN  Outcome: Progressing  3/5/2025 1718 by Shraddha Duke RN  Outcome: Progressing

## 2025-03-07 ENCOUNTER — APPOINTMENT (OUTPATIENT)
Dept: GENERAL RADIOLOGY | Age: 40
DRG: 981 | End: 2025-03-07
Payer: MEDICARE

## 2025-03-07 LAB
ALBUMIN SERPL-MCNC: 3.4 G/DL (ref 3.5–5.2)
ALP SERPL-CCNC: 95 U/L (ref 35–104)
ALT SERPL-CCNC: 7 U/L (ref 0–32)
ANION GAP SERPL CALCULATED.3IONS-SCNC: 16 MMOL/L (ref 7–16)
AST SERPL-CCNC: 18 U/L (ref 0–31)
BILIRUB SERPL-MCNC: 0.3 MG/DL (ref 0–1.2)
BUN SERPL-MCNC: 24 MG/DL (ref 6–20)
CALCIUM SERPL-MCNC: 9.4 MG/DL (ref 8.6–10.2)
CHLORIDE SERPL-SCNC: 101 MMOL/L (ref 98–107)
CO2 SERPL-SCNC: 23 MMOL/L (ref 22–29)
CREAT SERPL-MCNC: 6.5 MG/DL (ref 0.5–1)
ERYTHROCYTE [DISTWIDTH] IN BLOOD BY AUTOMATED COUNT: 14.5 % (ref 11.5–15)
GFR, ESTIMATED: 8 ML/MIN/1.73M2
GLUCOSE BLD-MCNC: 244 MG/DL (ref 74–99)
GLUCOSE BLD-MCNC: 360 MG/DL (ref 74–99)
GLUCOSE BLD-MCNC: 413 MG/DL (ref 74–99)
GLUCOSE BLD-MCNC: 495 MG/DL (ref 74–99)
GLUCOSE SERPL-MCNC: 142 MG/DL (ref 74–99)
HCT VFR BLD AUTO: 23 % (ref 34–48)
HGB BLD-MCNC: 7.8 G/DL (ref 11.5–15.5)
MAGNESIUM SERPL-MCNC: 2 MG/DL (ref 1.6–2.6)
MCH RBC QN AUTO: 29.9 PG (ref 26–35)
MCHC RBC AUTO-ENTMCNC: 33.9 G/DL (ref 32–34.5)
MCV RBC AUTO: 88.1 FL (ref 80–99.9)
PHOSPHATE SERPL-MCNC: 2.8 MG/DL (ref 2.5–4.5)
PLATELET # BLD AUTO: 238 K/UL (ref 130–450)
PMV BLD AUTO: 9.5 FL (ref 7–12)
POTASSIUM SERPL-SCNC: 3.7 MMOL/L (ref 3.5–5)
PROT SERPL-MCNC: 6.5 G/DL (ref 6.4–8.3)
RBC # BLD AUTO: 2.61 M/UL (ref 3.5–5.5)
SODIUM SERPL-SCNC: 140 MMOL/L (ref 132–146)
WBC OTHER # BLD: 6 K/UL (ref 4.5–11.5)

## 2025-03-07 PROCEDURE — 2580000003 HC RX 258

## 2025-03-07 PROCEDURE — 6360000002 HC RX W HCPCS: Performed by: ANESTHESIOLOGY

## 2025-03-07 PROCEDURE — 7100000001 HC PACU RECOVERY - ADDTL 15 MIN: Performed by: ORTHOPAEDIC SURGERY

## 2025-03-07 PROCEDURE — C1769 GUIDE WIRE: HCPCS | Performed by: ORTHOPAEDIC SURGERY

## 2025-03-07 PROCEDURE — 2700000000 HC OXYGEN THERAPY PER DAY

## 2025-03-07 PROCEDURE — 6370000000 HC RX 637 (ALT 250 FOR IP): Performed by: NURSE PRACTITIONER

## 2025-03-07 PROCEDURE — 20694 RMVL EXT FIXJ SYS UNDER ANES: CPT | Performed by: ORTHOPAEDIC SURGERY

## 2025-03-07 PROCEDURE — 2709999900 HC NON-CHARGEABLE SUPPLY: Performed by: ORTHOPAEDIC SURGERY

## 2025-03-07 PROCEDURE — 6370000000 HC RX 637 (ALT 250 FOR IP): Performed by: ANESTHESIOLOGY

## 2025-03-07 PROCEDURE — 2720000010 HC SURG SUPPLY STERILE: Performed by: ORTHOPAEDIC SURGERY

## 2025-03-07 PROCEDURE — 2500000003 HC RX 250 WO HCPCS: Performed by: STUDENT IN AN ORGANIZED HEALTH CARE EDUCATION/TRAINING PROGRAM

## 2025-03-07 PROCEDURE — 6370000000 HC RX 637 (ALT 250 FOR IP): Performed by: STUDENT IN AN ORGANIZED HEALTH CARE EDUCATION/TRAINING PROGRAM

## 2025-03-07 PROCEDURE — 6360000002 HC RX W HCPCS: Performed by: STUDENT IN AN ORGANIZED HEALTH CARE EDUCATION/TRAINING PROGRAM

## 2025-03-07 PROCEDURE — 64447 NJX AA&/STRD FEMORAL NRV IMG: CPT | Performed by: ANESTHESIOLOGY

## 2025-03-07 PROCEDURE — 84100 ASSAY OF PHOSPHORUS: CPT

## 2025-03-07 PROCEDURE — 83735 ASSAY OF MAGNESIUM: CPT

## 2025-03-07 PROCEDURE — 64445 NJX AA&/STRD SCIATIC NRV IMG: CPT | Performed by: ANESTHESIOLOGY

## 2025-03-07 PROCEDURE — 27822 TREATMENT OF ANKLE FRACTURE: CPT | Performed by: ORTHOPAEDIC SURGERY

## 2025-03-07 PROCEDURE — 2500000003 HC RX 250 WO HCPCS

## 2025-03-07 PROCEDURE — 6370000000 HC RX 637 (ALT 250 FOR IP): Performed by: INTERNAL MEDICINE

## 2025-03-07 PROCEDURE — 77071 MNL APPL STRS JT RADIOGRAPHY: CPT | Performed by: ORTHOPAEDIC SURGERY

## 2025-03-07 PROCEDURE — 3700000001 HC ADD 15 MINUTES (ANESTHESIA): Performed by: ORTHOPAEDIC SURGERY

## 2025-03-07 PROCEDURE — 2060000000 HC ICU INTERMEDIATE R&B

## 2025-03-07 PROCEDURE — 36415 COLL VENOUS BLD VENIPUNCTURE: CPT

## 2025-03-07 PROCEDURE — 0QP7X5Z REMOVAL OF EXTERNAL FIXATION DEVICE FROM LEFT UPPER FEMUR, EXTERNAL APPROACH: ICD-10-PCS | Performed by: ORTHOPAEDIC SURGERY

## 2025-03-07 PROCEDURE — 6360000002 HC RX W HCPCS

## 2025-03-07 PROCEDURE — 82962 GLUCOSE BLOOD TEST: CPT

## 2025-03-07 PROCEDURE — 27829 TREAT LOWER LEG JOINT: CPT | Performed by: ORTHOPAEDIC SURGERY

## 2025-03-07 PROCEDURE — 73610 X-RAY EXAM OF ANKLE: CPT

## 2025-03-07 PROCEDURE — 6360000002 HC RX W HCPCS: Performed by: INTERNAL MEDICINE

## 2025-03-07 PROCEDURE — 0QSK04Z REPOSITION LEFT FIBULA WITH INTERNAL FIXATION DEVICE, OPEN APPROACH: ICD-10-PCS | Performed by: ORTHOPAEDIC SURGERY

## 2025-03-07 PROCEDURE — C1713 ANCHOR/SCREW BN/BN,TIS/BN: HCPCS | Performed by: ORTHOPAEDIC SURGERY

## 2025-03-07 PROCEDURE — 80053 COMPREHEN METABOLIC PANEL: CPT

## 2025-03-07 PROCEDURE — 7100000000 HC PACU RECOVERY - FIRST 15 MIN: Performed by: ORTHOPAEDIC SURGERY

## 2025-03-07 PROCEDURE — 0QSH04Z REPOSITION LEFT TIBIA WITH INTERNAL FIXATION DEVICE, OPEN APPROACH: ICD-10-PCS | Performed by: ORTHOPAEDIC SURGERY

## 2025-03-07 PROCEDURE — 99232 SBSQ HOSP IP/OBS MODERATE 35: CPT | Performed by: INTERNAL MEDICINE

## 2025-03-07 PROCEDURE — 3600000005 HC SURGERY LEVEL 5 BASE: Performed by: ORTHOPAEDIC SURGERY

## 2025-03-07 PROCEDURE — 99232 SBSQ HOSP IP/OBS MODERATE 35: CPT | Performed by: STUDENT IN AN ORGANIZED HEALTH CARE EDUCATION/TRAINING PROGRAM

## 2025-03-07 PROCEDURE — 3700000000 HC ANESTHESIA ATTENDED CARE: Performed by: ORTHOPAEDIC SURGERY

## 2025-03-07 PROCEDURE — 90935 HEMODIALYSIS ONE EVALUATION: CPT

## 2025-03-07 PROCEDURE — 85027 COMPLETE CBC AUTOMATED: CPT

## 2025-03-07 PROCEDURE — 3600000015 HC SURGERY LEVEL 5 ADDTL 15MIN: Performed by: ORTHOPAEDIC SURGERY

## 2025-03-07 DEVICE — IMPLANTABLE DEVICE: Type: IMPLANTABLE DEVICE | Site: ANKLE | Status: FUNCTIONAL

## 2025-03-07 DEVICE — SCREW BNE L44MM DIA4MM S STL CANN SHT 1/3 THRD SM HEX SOCK: Type: IMPLANTABLE DEVICE | Site: ANKLE | Status: FUNCTIONAL

## 2025-03-07 DEVICE — SCREW BNE L14MM DIA3.5MM CORT S STL ST LOK FULL THRD: Type: IMPLANTABLE DEVICE | Site: ANKLE | Status: FUNCTIONAL

## 2025-03-07 DEVICE — SCREW BNE L16MM DIA3.5MM CORT S STL ST LOK FULL THRD: Type: IMPLANTABLE DEVICE | Site: ANKLE | Status: FUNCTIONAL

## 2025-03-07 DEVICE — SCREW BNE L45MM DIA3.5MM CORT S STL ST NONCANNULATED LOK: Type: IMPLANTABLE DEVICE | Site: ANKLE | Status: FUNCTIONAL

## 2025-03-07 DEVICE — SCREW BNE L12MM DIA3.5MM CORT S STL ST NONCANNULATED LOK: Type: IMPLANTABLE DEVICE | Site: ANKLE | Status: FUNCTIONAL

## 2025-03-07 DEVICE — SCREW BNE L46MM DIA3.5MM CORT S STL ST NONCANNULATED LOK: Type: IMPLANTABLE DEVICE | Site: ANKLE | Status: FUNCTIONAL

## 2025-03-07 DEVICE — SCREW BNE L14MM DIA3.5MM CORT S STL ST NONCANNULATED LOK: Type: IMPLANTABLE DEVICE | Site: ANKLE | Status: FUNCTIONAL

## 2025-03-07 DEVICE — SCREW BNE L60MM DIA3.5MM CORT S STL ST NONCANNULATED LOK: Type: IMPLANTABLE DEVICE | Site: ANKLE | Status: FUNCTIONAL

## 2025-03-07 DEVICE — SCREW BNE L12MM DIA3.5MM CORT S STL ST LOK FULL THRD: Type: IMPLANTABLE DEVICE | Site: ANKLE | Status: FUNCTIONAL

## 2025-03-07 DEVICE — PLATE BNE L117MM 10 H S STL 1/3 TBLR LOK COMPR W/ CLLR FOR: Type: IMPLANTABLE DEVICE | Site: ANKLE | Status: FUNCTIONAL

## 2025-03-07 RX ORDER — OXYCODONE HCL 10 MG/1
10 TABLET, FILM COATED, EXTENDED RELEASE ORAL ONCE
Status: COMPLETED | OUTPATIENT
Start: 2025-03-07 | End: 2025-03-07

## 2025-03-07 RX ORDER — DIPHENHYDRAMINE HYDROCHLORIDE 50 MG/ML
12.5 INJECTION INTRAMUSCULAR; INTRAVENOUS
Status: DISCONTINUED | OUTPATIENT
Start: 2025-03-07 | End: 2025-03-07 | Stop reason: HOSPADM

## 2025-03-07 RX ORDER — LIDOCAINE HYDROCHLORIDE 20 MG/ML
INJECTION, SOLUTION INTRAVENOUS
Status: DISCONTINUED | OUTPATIENT
Start: 2025-03-07 | End: 2025-03-07 | Stop reason: SDUPTHER

## 2025-03-07 RX ORDER — ONDANSETRON 2 MG/ML
4 INJECTION INTRAMUSCULAR; INTRAVENOUS
Status: DISCONTINUED | OUTPATIENT
Start: 2025-03-07 | End: 2025-03-07 | Stop reason: HOSPADM

## 2025-03-07 RX ORDER — SODIUM CHLORIDE 9 MG/ML
INJECTION, SOLUTION INTRAVENOUS
Status: DISCONTINUED | OUTPATIENT
Start: 2025-03-07 | End: 2025-03-07 | Stop reason: SDUPTHER

## 2025-03-07 RX ORDER — HYDROMORPHONE HYDROCHLORIDE 1 MG/ML
0.5 INJECTION, SOLUTION INTRAMUSCULAR; INTRAVENOUS; SUBCUTANEOUS EVERY 5 MIN PRN
Status: DISCONTINUED | OUTPATIENT
Start: 2025-03-07 | End: 2025-03-07 | Stop reason: HOSPADM

## 2025-03-07 RX ORDER — ROCURONIUM BROMIDE 10 MG/ML
INJECTION, SOLUTION INTRAVENOUS
Status: DISCONTINUED | OUTPATIENT
Start: 2025-03-07 | End: 2025-03-07 | Stop reason: SDUPTHER

## 2025-03-07 RX ORDER — SODIUM CHLORIDE 0.9 % (FLUSH) 0.9 %
5-40 SYRINGE (ML) INJECTION EVERY 12 HOURS SCHEDULED
Status: DISCONTINUED | OUTPATIENT
Start: 2025-03-07 | End: 2025-03-07 | Stop reason: HOSPADM

## 2025-03-07 RX ORDER — SODIUM CHLORIDE 9 MG/ML
INJECTION, SOLUTION INTRAVENOUS PRN
Status: DISCONTINUED | OUTPATIENT
Start: 2025-03-07 | End: 2025-03-07 | Stop reason: HOSPADM

## 2025-03-07 RX ORDER — MIDAZOLAM HYDROCHLORIDE 2 MG/2ML
1 INJECTION, SOLUTION INTRAMUSCULAR; INTRAVENOUS PRN
Status: DISCONTINUED | OUTPATIENT
Start: 2025-03-07 | End: 2025-03-07 | Stop reason: HOSPADM

## 2025-03-07 RX ORDER — CELECOXIB 100 MG/1
200 CAPSULE ORAL ONCE
Status: COMPLETED | OUTPATIENT
Start: 2025-03-07 | End: 2025-03-07

## 2025-03-07 RX ORDER — ROPIVACAINE HYDROCHLORIDE 5 MG/ML
INJECTION, SOLUTION EPIDURAL; INFILTRATION; PERINEURAL
Status: COMPLETED | OUTPATIENT
Start: 2025-03-07 | End: 2025-03-07

## 2025-03-07 RX ORDER — MEPERIDINE HYDROCHLORIDE 25 MG/ML
12.5 INJECTION INTRAMUSCULAR; INTRAVENOUS; SUBCUTANEOUS
Status: DISCONTINUED | OUTPATIENT
Start: 2025-03-07 | End: 2025-03-07 | Stop reason: HOSPADM

## 2025-03-07 RX ORDER — DEXAMETHASONE SODIUM PHOSPHATE 10 MG/ML
4 INJECTION, SOLUTION INTRAMUSCULAR; INTRAVENOUS ONCE
Status: DISCONTINUED | OUTPATIENT
Start: 2025-03-07 | End: 2025-03-07 | Stop reason: HOSPADM

## 2025-03-07 RX ORDER — PROPOFOL 10 MG/ML
INJECTION, EMULSION INTRAVENOUS
Status: DISCONTINUED | OUTPATIENT
Start: 2025-03-07 | End: 2025-03-07 | Stop reason: SDUPTHER

## 2025-03-07 RX ORDER — DEXAMETHASONE SODIUM PHOSPHATE 10 MG/ML
INJECTION, SOLUTION INTRA-ARTICULAR; INTRALESIONAL; INTRAMUSCULAR; INTRAVENOUS; SOFT TISSUE
Status: DISCONTINUED | OUTPATIENT
Start: 2025-03-07 | End: 2025-03-07 | Stop reason: SDUPTHER

## 2025-03-07 RX ORDER — HYDRALAZINE HYDROCHLORIDE 20 MG/ML
10 INJECTION INTRAMUSCULAR; INTRAVENOUS
Status: DISCONTINUED | OUTPATIENT
Start: 2025-03-07 | End: 2025-03-07 | Stop reason: HOSPADM

## 2025-03-07 RX ORDER — OXYCODONE AND ACETAMINOPHEN 5; 325 MG/1; MG/1
1 TABLET ORAL EVERY 6 HOURS PRN
Qty: 28 TABLET | Refills: 0 | Status: SHIPPED | OUTPATIENT
Start: 2025-03-07 | End: 2025-03-07

## 2025-03-07 RX ORDER — DEXAMETHASONE SODIUM PHOSPHATE 4 MG/ML
INJECTION, SOLUTION INTRA-ARTICULAR; INTRALESIONAL; INTRAMUSCULAR; INTRAVENOUS; SOFT TISSUE
Status: COMPLETED | OUTPATIENT
Start: 2025-03-07 | End: 2025-03-07

## 2025-03-07 RX ORDER — DROPERIDOL 2.5 MG/ML
0.62 INJECTION, SOLUTION INTRAMUSCULAR; INTRAVENOUS
Status: DISCONTINUED | OUTPATIENT
Start: 2025-03-07 | End: 2025-03-07 | Stop reason: HOSPADM

## 2025-03-07 RX ORDER — NALOXONE HYDROCHLORIDE 0.4 MG/ML
INJECTION, SOLUTION INTRAMUSCULAR; INTRAVENOUS; SUBCUTANEOUS PRN
Status: DISCONTINUED | OUTPATIENT
Start: 2025-03-07 | End: 2025-03-07 | Stop reason: HOSPADM

## 2025-03-07 RX ORDER — LABETALOL HYDROCHLORIDE 5 MG/ML
10 INJECTION, SOLUTION INTRAVENOUS
Status: DISCONTINUED | OUTPATIENT
Start: 2025-03-07 | End: 2025-03-07 | Stop reason: HOSPADM

## 2025-03-07 RX ORDER — IPRATROPIUM BROMIDE AND ALBUTEROL SULFATE 2.5; .5 MG/3ML; MG/3ML
1 SOLUTION RESPIRATORY (INHALATION)
Status: DISCONTINUED | OUTPATIENT
Start: 2025-03-07 | End: 2025-03-07 | Stop reason: HOSPADM

## 2025-03-07 RX ORDER — ONDANSETRON 2 MG/ML
INJECTION INTRAMUSCULAR; INTRAVENOUS
Status: DISCONTINUED | OUTPATIENT
Start: 2025-03-07 | End: 2025-03-07 | Stop reason: SDUPTHER

## 2025-03-07 RX ORDER — ACETAMINOPHEN 500 MG
1000 TABLET ORAL ONCE
Status: COMPLETED | OUTPATIENT
Start: 2025-03-07 | End: 2025-03-07

## 2025-03-07 RX ORDER — HYDROMORPHONE HYDROCHLORIDE 1 MG/ML
0.25 INJECTION, SOLUTION INTRAMUSCULAR; INTRAVENOUS; SUBCUTANEOUS EVERY 5 MIN PRN
Status: DISCONTINUED | OUTPATIENT
Start: 2025-03-07 | End: 2025-03-07 | Stop reason: HOSPADM

## 2025-03-07 RX ORDER — INSULIN LISPRO 100 [IU]/ML
10 INJECTION, SOLUTION INTRAVENOUS; SUBCUTANEOUS ONCE
Status: COMPLETED | OUTPATIENT
Start: 2025-03-07 | End: 2025-03-07

## 2025-03-07 RX ORDER — INSULIN LISPRO 100 [IU]/ML
10 INJECTION, SOLUTION INTRAVENOUS; SUBCUTANEOUS ONCE
Status: DISCONTINUED | OUTPATIENT
Start: 2025-03-07 | End: 2025-03-08

## 2025-03-07 RX ORDER — FENTANYL CITRATE 50 UG/ML
INJECTION, SOLUTION INTRAMUSCULAR; INTRAVENOUS
Status: DISCONTINUED | OUTPATIENT
Start: 2025-03-07 | End: 2025-03-07 | Stop reason: SDUPTHER

## 2025-03-07 RX ORDER — ROPIVACAINE HYDROCHLORIDE 5 MG/ML
30 INJECTION, SOLUTION EPIDURAL; INFILTRATION; PERINEURAL ONCE
Status: DISCONTINUED | OUTPATIENT
Start: 2025-03-07 | End: 2025-03-07 | Stop reason: HOSPADM

## 2025-03-07 RX ORDER — SODIUM CHLORIDE 0.9 % (FLUSH) 0.9 %
5-40 SYRINGE (ML) INJECTION PRN
Status: DISCONTINUED | OUTPATIENT
Start: 2025-03-07 | End: 2025-03-07 | Stop reason: HOSPADM

## 2025-03-07 RX ORDER — CEFAZOLIN SODIUM 1 G/3ML
INJECTION, POWDER, FOR SOLUTION INTRAMUSCULAR; INTRAVENOUS
Status: DISCONTINUED | OUTPATIENT
Start: 2025-03-07 | End: 2025-03-07 | Stop reason: SDUPTHER

## 2025-03-07 RX ORDER — OXYCODONE AND ACETAMINOPHEN 5; 325 MG/1; MG/1
1 TABLET ORAL EVERY 6 HOURS PRN
Qty: 28 TABLET | Refills: 0 | Status: SHIPPED | OUTPATIENT
Start: 2025-03-07 | End: 2025-03-11 | Stop reason: HOSPADM

## 2025-03-07 RX ADMIN — INSULIN LISPRO 4 UNITS: 100 INJECTION, SOLUTION INTRAVENOUS; SUBCUTANEOUS at 17:45

## 2025-03-07 RX ADMIN — PROPOFOL 110 MG: 10 INJECTION, EMULSION INTRAVENOUS at 08:06

## 2025-03-07 RX ADMIN — PANTOPRAZOLE SODIUM 40 MG: 40 INJECTION, POWDER, FOR SOLUTION INTRAVENOUS at 10:55

## 2025-03-07 RX ADMIN — Medication 3 MG: at 21:18

## 2025-03-07 RX ADMIN — SODIUM CHLORIDE: 9 INJECTION, SOLUTION INTRAVENOUS at 07:55

## 2025-03-07 RX ADMIN — SUCRALFATE 1 G: 1 TABLET ORAL at 21:19

## 2025-03-07 RX ADMIN — OXYCODONE HYDROCHLORIDE 5 MG: 5 TABLET ORAL at 21:18

## 2025-03-07 RX ADMIN — INSULIN LISPRO 8 UNITS: 100 INJECTION, SOLUTION INTRAVENOUS; SUBCUTANEOUS at 17:45

## 2025-03-07 RX ADMIN — CELECOXIB 200 MG: 100 CAPSULE ORAL at 07:42

## 2025-03-07 RX ADMIN — ONDANSETRON 4 MG: 2 INJECTION, SOLUTION INTRAMUSCULAR; INTRAVENOUS at 09:21

## 2025-03-07 RX ADMIN — CALCIUM ACETATE 667 MG: 667 CAPSULE ORAL at 12:40

## 2025-03-07 RX ADMIN — HEPARIN SODIUM 5000 UNITS: 5000 INJECTION INTRAVENOUS; SUBCUTANEOUS at 23:01

## 2025-03-07 RX ADMIN — DEXAMETHASONE SODIUM PHOSPHATE 10 MG: 10 INJECTION INTRAMUSCULAR; INTRAVENOUS at 08:11

## 2025-03-07 RX ADMIN — MIDAZOLAM 2 MG: 1 INJECTION INTRAMUSCULAR; INTRAVENOUS at 07:53

## 2025-03-07 RX ADMIN — PANTOPRAZOLE SODIUM 40 MG: 40 INJECTION, POWDER, FOR SOLUTION INTRAVENOUS at 21:19

## 2025-03-07 RX ADMIN — INSULIN LISPRO 4 UNITS: 100 INJECTION, SOLUTION INTRAVENOUS; SUBCUTANEOUS at 11:57

## 2025-03-07 RX ADMIN — ROCURONIUM BROMIDE 50 MG: 50 INJECTION INTRAVENOUS at 08:06

## 2025-03-07 RX ADMIN — OXYCODONE HYDROCHLORIDE 10 MG: 10 TABLET, FILM COATED, EXTENDED RELEASE ORAL at 07:42

## 2025-03-07 RX ADMIN — INSULIN LISPRO 2 UNITS: 100 INJECTION, SOLUTION INTRAVENOUS; SUBCUTANEOUS at 11:57

## 2025-03-07 RX ADMIN — ROPIVACAINE HYDROCHLORIDE 20 ML: 5 INJECTION, SOLUTION EPIDURAL; INFILTRATION; PERINEURAL at 07:57

## 2025-03-07 RX ADMIN — MIDODRINE HYDROCHLORIDE 10 MG: 5 TABLET ORAL at 12:40

## 2025-03-07 RX ADMIN — SODIUM CHLORIDE, PRESERVATIVE FREE 10 ML: 5 INJECTION INTRAVENOUS at 21:19

## 2025-03-07 RX ADMIN — INSULIN LISPRO 10 UNITS: 100 INJECTION, SOLUTION INTRAVENOUS; SUBCUTANEOUS at 23:23

## 2025-03-07 RX ADMIN — LIDOCAINE HYDROCHLORIDE 100 MG: 20 INJECTION, SOLUTION INTRAVENOUS at 08:06

## 2025-03-07 RX ADMIN — SUGAMMADEX 200 MG: 100 INJECTION, SOLUTION INTRAVENOUS at 09:28

## 2025-03-07 RX ADMIN — OXYCODONE HYDROCHLORIDE 5 MG: 5 TABLET ORAL at 12:48

## 2025-03-07 RX ADMIN — HYDROMORPHONE HYDROCHLORIDE 0.5 MG: 1 INJECTION, SOLUTION INTRAMUSCULAR; INTRAVENOUS; SUBCUTANEOUS at 18:42

## 2025-03-07 RX ADMIN — DEXAMETHASONE SODIUM PHOSPHATE 4 MG: 4 INJECTION, SOLUTION INTRAMUSCULAR; INTRAVENOUS at 07:54

## 2025-03-07 RX ADMIN — INSULIN GLARGINE 8 UNITS: 100 INJECTION, SOLUTION SUBCUTANEOUS at 21:19

## 2025-03-07 RX ADMIN — CALCIUM ACETATE 667 MG: 667 CAPSULE ORAL at 18:00

## 2025-03-07 RX ADMIN — CARVEDILOL 25 MG: 25 TABLET, FILM COATED ORAL at 12:40

## 2025-03-07 RX ADMIN — SUCRALFATE 1 G: 1 TABLET ORAL at 11:57

## 2025-03-07 RX ADMIN — INSULIN LISPRO 8 UNITS: 100 INJECTION, SOLUTION INTRAVENOUS; SUBCUTANEOUS at 21:19

## 2025-03-07 RX ADMIN — ACETAMINOPHEN 1000 MG: 500 TABLET ORAL at 07:41

## 2025-03-07 RX ADMIN — SUCRALFATE 1 G: 1 TABLET ORAL at 18:00

## 2025-03-07 RX ADMIN — ROPIVACAINE HYDROCHLORIDE 40 ML: 5 INJECTION EPIDURAL; INFILTRATION; PERINEURAL at 07:54

## 2025-03-07 RX ADMIN — FENTANYL CITRATE 100 MCG: 0.05 INJECTION, SOLUTION INTRAMUSCULAR; INTRAVENOUS at 08:06

## 2025-03-07 RX ADMIN — HYDROMORPHONE HYDROCHLORIDE 0.5 MG: 1 INJECTION, SOLUTION INTRAMUSCULAR; INTRAVENOUS; SUBCUTANEOUS at 23:01

## 2025-03-07 RX ADMIN — DIPHENHYDRAMINE HYDROCHLORIDE 25 MG: 25 TABLET ORAL at 21:19

## 2025-03-07 RX ADMIN — CEFAZOLIN 2 G: 1 INJECTION, POWDER, FOR SOLUTION INTRAMUSCULAR; INTRAVENOUS at 08:14

## 2025-03-07 ASSESSMENT — PAIN DESCRIPTION - ORIENTATION
ORIENTATION: LEFT

## 2025-03-07 ASSESSMENT — PAIN DESCRIPTION - LOCATION
LOCATION: LEG;FOOT
LOCATION: GENERALIZED;LEG
LOCATION: GENERALIZED;LEG
LOCATION: LEG

## 2025-03-07 ASSESSMENT — PAIN DESCRIPTION - PAIN TYPE
TYPE: CHRONIC PAIN

## 2025-03-07 ASSESSMENT — PAIN SCALES - GENERAL
PAINLEVEL_OUTOF10: 8
PAINLEVEL_OUTOF10: 8
PAINLEVEL_OUTOF10: 7

## 2025-03-07 ASSESSMENT — PAIN - FUNCTIONAL ASSESSMENT
PAIN_FUNCTIONAL_ASSESSMENT: PREVENTS OR INTERFERES SOME ACTIVE ACTIVITIES AND ADLS
PAIN_FUNCTIONAL_ASSESSMENT: ADULT NONVERBAL PAIN SCALE (NPVS)

## 2025-03-07 ASSESSMENT — PAIN DESCRIPTION - FREQUENCY
FREQUENCY: CONTINUOUS

## 2025-03-07 ASSESSMENT — PAIN DESCRIPTION - ONSET
ONSET: ON-GOING

## 2025-03-07 ASSESSMENT — PAIN DESCRIPTION - DESCRIPTORS
DESCRIPTORS: ACHING;DISCOMFORT;SORE
DESCRIPTORS: ACHING;DISCOMFORT;SORE
DESCRIPTORS: ACHING;DISCOMFORT
DESCRIPTORS: ACHING;DISCOMFORT

## 2025-03-07 ASSESSMENT — ENCOUNTER SYMPTOMS: SHORTNESS OF BREATH: 1

## 2025-03-07 ASSESSMENT — LIFESTYLE VARIABLES: SMOKING_STATUS: 1

## 2025-03-07 NOTE — ANESTHESIA PROCEDURE NOTES
Peripheral Block    Patient location during procedure: pre-op  Reason for block: post-op pain management and at surgeon's request  Start time: 3/7/2025 7:54 AM  End time: 3/7/2025 7:56 AM  Staffing  Performed: anesthesiologist   Anesthesiologist: Cornelius Verduzco DO  Performed by: Cornelius Verduzco DO  Authorized by: Cornelius Verduzco DO    Preanesthetic Checklist  Completed: patient identified, IV checked, site marked, risks and benefits discussed, surgical/procedural consents, equipment checked, pre-op evaluation, timeout performed, anesthesia consent given, oxygen available and monitors applied/VS acknowledged  Peripheral Block   Patient position: supine  Prep: ChloraPrep  Provider prep: mask and sterile gloves  Patient monitoring: cardiac monitor, continuous pulse ox, frequent blood pressure checks and IV access  Block type: Sciatic  Popliteal  Laterality: left  Injection technique: single-shot  Guidance: ultrasound guided  Local infiltration: lidocaine  Infiltration strength: 1 %  Local infiltration: lidocaine  Dose: 3 mL    Needle   Needle gauge: 21 G  Needle localization: ultrasound guidance  Needle length: 10 cm  Assessment   Injection assessment: negative aspiration for heme, no paresthesia on injection and local visualized surrounding nerve on ultrasound  Paresthesia pain: none  Slow fractionated injection: yes  Hemodynamics: stable  Outcomes: uncomplicated and patient tolerated procedure well    Additional Notes  U/S [id number].  Time out performed.         Medications Administered  dexAMETHasone (DECADRON) injection 4 mg/mL - Perineural   4 mg - 3/7/2025 7:54:00 AM  ropivacaine (NAROPIN) injection 0.5% - Perineural   40 mL - 3/7/2025 7:54:00 AM

## 2025-03-07 NOTE — ANESTHESIA POSTPROCEDURE EVALUATION
Department of Anesthesiology  Postprocedure Note    Patient: Michelle Nettles  MRN: [id number]  YOB: 1985  Date of evaluation: 3/7/2025    Procedure Summary       Date: 03/07/25 Room / Location: [address]    Anesthesia Start: 0801 Anesthesia Stop: 1002    Procedure: REMOVAL OF EXTERNAL FIXATOR FROM LEFT LOWER EXTREMITY, OPEN REDUCTION WITH INTERNAL FIXATION LEFT ANKLE TRIMALLEOLAR FRACTURE (Left: Ankle) Diagnosis:       Closed trimalleolar fracture of left ankle      Presence of artificial lower extremity      (Closed trimalleolar fracture of left ankle [S82.852A])      (Presence of artificial lower extremity [Z97.10])    Surgeons: Peyman Germain MD Responsible Provider: Cornelius Verduzco DO    Anesthesia Type: General ASA Status: 3            Anesthesia Type: General    David Phase I: David Score: 7    David Phase II:      Anesthesia Post Evaluation    Patient location during evaluation: PACU  Patient participation: complete - patient participated  Level of consciousness: awake and alert  Airway patency: patent  Nausea & Vomiting: no nausea and no vomiting  Cardiovascular status: blood pressure returned to baseline  Respiratory status: acceptable  Hydration status: euvolemic  Multimodal analgesia pain management approach  Pain management: adequate    No notable events documented.

## 2025-03-07 NOTE — ANESTHESIA PROCEDURE NOTES
Peripheral Block    Patient location during procedure: pre-op  Reason for block: post-op pain management and at surgeon's request  Start time: 3/7/2025 7:57 AM  End time: 3/7/2025 7:58 AM  Staffing  Performed: anesthesiologist   Anesthesiologist: Cornelius Verduzco DO  Performed by: Cornelius Verduzco DO  Authorized by: Cornelius Verduzco DO    Preanesthetic Checklist  Completed: patient identified, IV checked, site marked, risks and benefits discussed, surgical/procedural consents, equipment checked, pre-op evaluation, timeout performed, anesthesia consent given, oxygen available and monitors applied/VS acknowledged  Peripheral Block   Patient position: supine  Prep: alcohol swabs  Provider prep: mask and sterile gloves  Patient monitoring: cardiac monitor, continuous pulse ox, frequent blood pressure checks and IV access  Block type: Saphenous  Laterality: left  Injection technique: single-shot  Guidance: ultrasound guided  Local infiltration: ropivacaine  Local infiltration: ropivacaine    Needle   Needle type: combined needle/nerve stimulator   Needle gauge: 22 G  Needle localization: ultrasound guidance  Needle length: 5 cm  Assessment   Injection assessment: negative aspiration for heme, no paresthesia on injection and local visualized surrounding nerve on ultrasound  Paresthesia pain: none  Slow fractionated injection: yes  Hemodynamics: stable  Outcomes: uncomplicated and patient tolerated procedure well    Additional Notes  Timeout performed  Medications Administered  ropivacaine (NAROPIN) injection 0.5% - Perineural   20 mL - 3/7/2025 7:57:00 AM

## 2025-03-07 NOTE — ANESTHESIA PRE PROCEDURE
nightly  Patient taking differently: Inject 8 Units into the skin nightly 2/21/25   Cassie Perrin MD   enoxaparin Sodium (LOVENOX) 30 MG/0.3ML injection Inject 0.3 mLs into the skin daily 2/15/25 3/17/25  Conrad Markham DO   ferrous sulfate (IRON 325) 325 (65 Fe) MG tablet Take 1 tablet by mouth every other day 2/4/25   Rogers Rodríguez MD   pantoprazole (PROTONIX) 40 MG tablet Take 1 tablet by mouth 2 times daily (before meals) 2/4/25   Rogers Rodríguez MD   Blood Pressure Monitoring (BLOOD PRESSURE KIT) LIANNE Check BP daily 2/4/25   Rogers Rodríguez MD   Misc. Devices (PULSE OXIMETER FOR FINGER) MISC Check HR and pulse ox daily and PRN symptoms 2/4/25   Rogers Rodríguez MD   vitamin B-12 (CYANOCOBALAMIN) 500 MCG tablet Take 1 tablet by mouth daily 2/4/25   Rogers Rodríguez MD   clopidogrel (PLAVIX) 75 MG tablet Take 1 tablet by mouth daily 2/4/25   Rogers Rodríguez MD   Accu-Chek Softclix Lancets MISC 1 each by Does not apply route 4 times daily 1/28/25   Angelina Benitez APRN - CNP   Glucagon, rDNA, (GLUCAGON EMERGENCY) 1 MG KIT Inject 1 mg as directed as needed (for blood glucose level <50) 1/28/25   Angelina Benitez APRN - CNP   docusate sodium (COLACE) 100 MG capsule Take 1 capsule by mouth daily 1/22/25   Rogers Rodríguez MD   fluticasone (FLONASE) 50 MCG/ACT nasal spray 2 sprays by Each Nostril route daily 1/15/25   Krystyna Salcido MD   calcium acetate (PHOSLO) 667 MG CAPS capsule Take 3 capsules by mouth 3 times daily (with meals)  Patient taking differently: Take 1 capsule by mouth 3 times daily (with meals) 1/14/25 2/26/25  Krystyna Salcido MD   sucralfate (CARAFATE) 1 GM tablet Take 1 tablet by mouth 4 times daily (before meals and nightly) 1/14/25   Krystyna Salcido MD   Continuous Glucose Transmitter (DEXCOM G6 TRANSMITTER) MISC Use with sensors 1/3/25   Angelina Benitez APRN - CNP   carvedilol (COREG) 25 MG tablet TAKE 1 TABLET BY MOUTH DAILY WITH SUPPER  Patient taking differently: Take 1

## 2025-03-07 NOTE — FLOWSHEET NOTE
03/07/25 1708   Vital Signs   /72   Temp 97.3 °F (36.3 °C)   Pulse 92   Respirations 16   Weight - Scale 72 kg (158 lb 11.7 oz)   Weight Method Bed scale   Percent Weight Change -0.79   Post-Hemodialysis Assessment   Post-Treatment Procedures Blood returned;Access bleeding time < 10 minutes   Machine Disinfection Process Acid/Vinegar Clean;Heat Disinfect;Exterior Machine Disinfection   Rinseback Volume (ml) 300 ml   Blood Volume Processed (Liters) 88.7 L   Dialyzer Clearance Lightly streaked   Duration of Treatment (minutes) 240 minutes   Heparin Amount Administered During Treatment (mL) 0 mL   Hemodialysis Intake (ml) 300 ml   Hemodialysis Output (ml) 2900 ml   NET Removed (ml) 2600   Tolerated Treatment Good   Patient Response to Treatment tolerated well, blood returned, needles pulled sites held stasis achieved, bandaids applied, +thirll, +bruit

## 2025-03-07 NOTE — OP NOTE
Operative Note      Patient: Michelle Nettles  YOB: 1985  MRN: [id number]    Date of Procedure: 3/7/2025    Pre-Op Diagnosis Codes:      * Closed trimalleolar fracture of left ankle [S82.852A]     * Presence of artificial lower extremity [Z97.10]    Post-Op Diagnosis: Same       Procedure:  1.  Removal of external fixator left lower extremity    2.  Open reduction internal fixation left trimalleolar ankle fracture without fixation of the posterior malleolus    3.  Stress examination of the syndesmosis under anesthesia, left ankle    4.  Open reduction internal fixation left ankle syndesmosis    Surgeon(s):  Peyman Germain MD    Assistant:   Resident: Crispin Felipe DO; Davon Shaw DO; Tej Kay DO    Anesthesia: General    Estimated Blood Loss (mL): Minimal    Complications: None    Specimens:   * No specimens in log *    Implants:  Implant Name Type Inv. Item Serial No.  Lot No. LRB No. Used Action   SCREW BNE L12MM DIA3.5MM TONI S STL [employer] IVONNE FULL THRD - [id number]  SCREW BNE L12MM DIA3.5MM TONI S STL ST IVONNE FULL THRD  [employer]  Left 2 Implanted   SCREW BNE L14MM DIA3.5MM TONI S STL ST IVONNE FULL THRD - [id number]  SCREW BNE L14MM DIA3.5MM TONI S STL ST IVONNE FULL THRD  [employer]  Left 2 Implanted   SCREW BNE L16MM DIA3.5MM TONI S STL ST IVONNE FULL THRD - [id number]  SCREW BNE L16MM DIA3.5MM TONI S STL ST IVONNE FULL THRD  [employer]  Left 1 Implanted         Drains: * No LDAs found *    Findings:  Infection Present At Time Of Surgery (PATOS) (choose all levels that have infection present):  No infection present  Other Findings: None    Detailed Description of Procedure:   Patient was identified in preoperative holding area, the left lower extremity was identified and marked by Dr. Germain.  Patient was then carefully brought back to the OR suite by anesthesia where she was carefully intubated sedated all bony prominences were 
 The patient tolerated the procedure well and there were no complications.      Cornelius Owen MD  3/5/2025       Electronically signed by Cornelius Owen MD on 3/5/2025 at 10:29 AM

## 2025-03-07 NOTE — DISCHARGE INSTRUCTIONS
[facility] Department of Orthopedic Surgery  [address] [facility] [address]    Dr. Jorge Moore, DO         MD Dr. Jorge Mensah MD Frank Ansevin, PA-C Sara Zatchok, PA-C Tyler Tsangaris PA-C      Orthopaedics Discharge Instructions   Weight bearing Status - Non-weight bearing - on left lower Extremity  Pain Medication   Contact Office for Medication Refill- [phone]  Office can refill pain medications no sooner than every 7 days  If patient discharging to facility then pain control will be continued per facility physician  Ice to operative/injured site for 15-30 minutes of each hour for next 5 days    Recommend that you continue to ice the area 2-3 times per day after this   Elevate operative/injured limb on 2 pillows at home  Goal is to have limb above the heart if able  Continue DVT Prophylaxis (blood clot prevention) as prescribed: aspirin 81mg twice daily   Fracture Care -  maintain splint    Follow up in office in approximately 2 weeks. Your first follow up appointment is often with one of our physician assistants.     Call the office at [phone] if having any concerns.     Watch for these significant complications.  Call physician if they or any other problems occur:  Fever over 101°, redness, swelling or warmth at the operative site  Unrelieved nausea    Foul smelling or cloudy drainage at the operative site   Unrelieved pain    Blood soaked dressing. (Some oozing may be normal)     Numb, pale, blue, cold or tingling extremity          It is the Department of Orthopaedic Trauma's standard of practice that providers will de-escalate (wean) all patients from narcotic (opioid) medications during the post-operative period.   We provide multimodal pain control, but opioid medications are tapered in all of our patients.  If patient requires referral to pain management for prolonged taper from opioid pain medication, we will facilitate this process.        Weight

## 2025-03-08 LAB
ALBUMIN SERPL-MCNC: 3.5 G/DL (ref 3.5–5.2)
ALP SERPL-CCNC: 100 U/L (ref 35–104)
ALT SERPL-CCNC: 6 U/L (ref 0–32)
ANION GAP SERPL CALCULATED.3IONS-SCNC: 15 MMOL/L (ref 7–16)
ANION GAP SERPL CALCULATED.3IONS-SCNC: 16 MMOL/L (ref 7–16)
AST SERPL-CCNC: 15 U/L (ref 0–31)
BILIRUB SERPL-MCNC: 0.2 MG/DL (ref 0–1.2)
BUN SERPL-MCNC: 19 MG/DL (ref 6–20)
BUN SERPL-MCNC: 22 MG/DL (ref 6–20)
CALCIUM SERPL-MCNC: 9.4 MG/DL (ref 8.6–10.2)
CALCIUM SERPL-MCNC: 9.5 MG/DL (ref 8.6–10.2)
CHLORIDE SERPL-SCNC: 92 MMOL/L (ref 98–107)
CHLORIDE SERPL-SCNC: 95 MMOL/L (ref 98–107)
CO2 SERPL-SCNC: 24 MMOL/L (ref 22–29)
CO2 SERPL-SCNC: 25 MMOL/L (ref 22–29)
CREAT SERPL-MCNC: 4.5 MG/DL (ref 0.5–1)
CREAT SERPL-MCNC: 5.3 MG/DL (ref 0.5–1)
ERYTHROCYTE [DISTWIDTH] IN BLOOD BY AUTOMATED COUNT: 14.4 % (ref 11.5–15)
GFR, ESTIMATED: 10 ML/MIN/1.73M2
GFR, ESTIMATED: 12 ML/MIN/1.73M2
GLUCOSE BLD-MCNC: 305 MG/DL (ref 74–99)
GLUCOSE BLD-MCNC: 317 MG/DL (ref 74–99)
GLUCOSE BLD-MCNC: 499 MG/DL (ref 74–99)
GLUCOSE BLD-MCNC: 81 MG/DL (ref 74–99)
GLUCOSE BLD-MCNC: >500 MG/DL (ref 74–99)
GLUCOSE BLD-MCNC: >500 MG/DL (ref 74–99)
GLUCOSE SERPL-MCNC: 209 MG/DL (ref 74–99)
GLUCOSE SERPL-MCNC: 538 MG/DL (ref 74–99)
HCT VFR BLD AUTO: 23.2 % (ref 34–48)
HGB BLD-MCNC: 7.9 G/DL (ref 11.5–15.5)
MAGNESIUM SERPL-MCNC: 2 MG/DL (ref 1.6–2.6)
MCH RBC QN AUTO: 29.6 PG (ref 26–35)
MCHC RBC AUTO-ENTMCNC: 34.1 G/DL (ref 32–34.5)
MCV RBC AUTO: 86.9 FL (ref 80–99.9)
MICROORGANISM SPEC CULT: NORMAL
MICROORGANISM SPEC CULT: NORMAL
PHOSPHATE SERPL-MCNC: 1.5 MG/DL (ref 2.5–4.5)
PLATELET # BLD AUTO: 277 K/UL (ref 130–450)
PMV BLD AUTO: 9.6 FL (ref 7–12)
POTASSIUM SERPL-SCNC: 3.4 MMOL/L (ref 3.5–5)
POTASSIUM SERPL-SCNC: 4.1 MMOL/L (ref 3.5–5)
PROT SERPL-MCNC: 6.9 G/DL (ref 6.4–8.3)
RBC # BLD AUTO: 2.67 M/UL (ref 3.5–5.5)
SERVICE CMNT-IMP: NORMAL
SERVICE CMNT-IMP: NORMAL
SODIUM SERPL-SCNC: 132 MMOL/L (ref 132–146)
SODIUM SERPL-SCNC: 135 MMOL/L (ref 132–146)
SPECIMEN DESCRIPTION: NORMAL
SPECIMEN DESCRIPTION: NORMAL
WBC OTHER # BLD: 6.4 K/UL (ref 4.5–11.5)

## 2025-03-08 PROCEDURE — 6360000002 HC RX W HCPCS: Performed by: STUDENT IN AN ORGANIZED HEALTH CARE EDUCATION/TRAINING PROGRAM

## 2025-03-08 PROCEDURE — 6370000000 HC RX 637 (ALT 250 FOR IP): Performed by: STUDENT IN AN ORGANIZED HEALTH CARE EDUCATION/TRAINING PROGRAM

## 2025-03-08 PROCEDURE — 97161 PT EVAL LOW COMPLEX 20 MIN: CPT

## 2025-03-08 PROCEDURE — 2500000003 HC RX 250 WO HCPCS: Performed by: STUDENT IN AN ORGANIZED HEALTH CARE EDUCATION/TRAINING PROGRAM

## 2025-03-08 PROCEDURE — 80048 BASIC METABOLIC PNL TOTAL CA: CPT

## 2025-03-08 PROCEDURE — 82962 GLUCOSE BLOOD TEST: CPT

## 2025-03-08 PROCEDURE — 2700000000 HC OXYGEN THERAPY PER DAY

## 2025-03-08 PROCEDURE — 2060000000 HC ICU INTERMEDIATE R&B

## 2025-03-08 PROCEDURE — 97535 SELF CARE MNGMENT TRAINING: CPT

## 2025-03-08 PROCEDURE — 6370000000 HC RX 637 (ALT 250 FOR IP)

## 2025-03-08 PROCEDURE — 2500000003 HC RX 250 WO HCPCS

## 2025-03-08 PROCEDURE — 83735 ASSAY OF MAGNESIUM: CPT

## 2025-03-08 PROCEDURE — 6370000000 HC RX 637 (ALT 250 FOR IP): Performed by: INTERNAL MEDICINE

## 2025-03-08 PROCEDURE — 85027 COMPLETE CBC AUTOMATED: CPT

## 2025-03-08 PROCEDURE — 84100 ASSAY OF PHOSPHORUS: CPT

## 2025-03-08 PROCEDURE — 6360000002 HC RX W HCPCS: Performed by: INTERNAL MEDICINE

## 2025-03-08 PROCEDURE — 6360000002 HC RX W HCPCS

## 2025-03-08 PROCEDURE — 6370000000 HC RX 637 (ALT 250 FOR IP): Performed by: NURSE PRACTITIONER

## 2025-03-08 PROCEDURE — 36415 COLL VENOUS BLD VENIPUNCTURE: CPT

## 2025-03-08 PROCEDURE — 99232 SBSQ HOSP IP/OBS MODERATE 35: CPT | Performed by: INTERNAL MEDICINE

## 2025-03-08 PROCEDURE — 80053 COMPREHEN METABOLIC PANEL: CPT

## 2025-03-08 PROCEDURE — 97165 OT EVAL LOW COMPLEX 30 MIN: CPT

## 2025-03-08 PROCEDURE — 97530 THERAPEUTIC ACTIVITIES: CPT

## 2025-03-08 RX ORDER — INSULIN LISPRO 100 [IU]/ML
10 INJECTION, SOLUTION INTRAVENOUS; SUBCUTANEOUS ONCE
Status: COMPLETED | OUTPATIENT
Start: 2025-03-08 | End: 2025-03-08

## 2025-03-08 RX ORDER — INSULIN GLARGINE 100 [IU]/ML
10 INJECTION, SOLUTION SUBCUTANEOUS NIGHTLY
Status: DISCONTINUED | OUTPATIENT
Start: 2025-03-08 | End: 2025-03-11 | Stop reason: HOSPADM

## 2025-03-08 RX ADMIN — SODIUM CHLORIDE, PRESERVATIVE FREE 10 ML: 5 INJECTION INTRAVENOUS at 08:34

## 2025-03-08 RX ADMIN — OXYCODONE HYDROCHLORIDE 5 MG: 5 TABLET ORAL at 18:28

## 2025-03-08 RX ADMIN — ASPIRIN 81 MG: 81 TABLET, COATED ORAL at 10:12

## 2025-03-08 RX ADMIN — SUCRALFATE 1 G: 1 TABLET ORAL at 21:41

## 2025-03-08 RX ADMIN — OXYCODONE HYDROCHLORIDE 5 MG: 5 TABLET ORAL at 08:35

## 2025-03-08 RX ADMIN — PANTOPRAZOLE SODIUM 40 MG: 40 INJECTION, POWDER, FOR SOLUTION INTRAVENOUS at 21:41

## 2025-03-08 RX ADMIN — SUCRALFATE 1 G: 1 TABLET ORAL at 10:12

## 2025-03-08 RX ADMIN — INSULIN LISPRO 4 UNITS: 100 INJECTION, SOLUTION INTRAVENOUS; SUBCUTANEOUS at 08:33

## 2025-03-08 RX ADMIN — INSULIN GLARGINE 10 UNITS: 100 INJECTION, SOLUTION SUBCUTANEOUS at 21:41

## 2025-03-08 RX ADMIN — CARVEDILOL 25 MG: 25 TABLET, FILM COATED ORAL at 08:32

## 2025-03-08 RX ADMIN — SODIUM CHLORIDE, PRESERVATIVE FREE 10 ML: 5 INJECTION INTRAVENOUS at 21:41

## 2025-03-08 RX ADMIN — CALCIUM ACETATE 667 MG: 667 CAPSULE ORAL at 12:01

## 2025-03-08 RX ADMIN — ONDANSETRON 4 MG: 2 INJECTION, SOLUTION INTRAMUSCULAR; INTRAVENOUS at 10:15

## 2025-03-08 RX ADMIN — HEPARIN SODIUM 5000 UNITS: 5000 INJECTION INTRAVENOUS; SUBCUTANEOUS at 17:19

## 2025-03-08 RX ADMIN — HYDROMORPHONE HYDROCHLORIDE 0.5 MG: 1 INJECTION, SOLUTION INTRAMUSCULAR; INTRAVENOUS; SUBCUTANEOUS at 10:13

## 2025-03-08 RX ADMIN — SUCRALFATE 1 G: 1 TABLET ORAL at 05:53

## 2025-03-08 RX ADMIN — INSULIN LISPRO 4 UNITS: 100 INJECTION, SOLUTION INTRAVENOUS; SUBCUTANEOUS at 17:18

## 2025-03-08 RX ADMIN — OXYCODONE HYDROCHLORIDE 5 MG: 5 TABLET ORAL at 13:31

## 2025-03-08 RX ADMIN — PANTOPRAZOLE SODIUM 40 MG: 40 INJECTION, POWDER, FOR SOLUTION INTRAVENOUS at 08:26

## 2025-03-08 RX ADMIN — HEPARIN SODIUM 5000 UNITS: 5000 INJECTION INTRAVENOUS; SUBCUTANEOUS at 08:32

## 2025-03-08 RX ADMIN — INSULIN LISPRO 6 UNITS: 100 INJECTION, SOLUTION INTRAVENOUS; SUBCUTANEOUS at 21:41

## 2025-03-08 RX ADMIN — SUCRALFATE 1 G: 1 TABLET ORAL at 17:19

## 2025-03-08 RX ADMIN — MULTIVITAMIN TABLET 1 TABLET: TABLET at 08:34

## 2025-03-08 RX ADMIN — HYDROMORPHONE HYDROCHLORIDE 0.5 MG: 1 INJECTION, SOLUTION INTRAMUSCULAR; INTRAVENOUS; SUBCUTANEOUS at 19:39

## 2025-03-08 RX ADMIN — Medication 3 MG: at 21:46

## 2025-03-08 RX ADMIN — HYDROMORPHONE HYDROCHLORIDE 0.5 MG: 1 INJECTION, SOLUTION INTRAMUSCULAR; INTRAVENOUS; SUBCUTANEOUS at 05:53

## 2025-03-08 RX ADMIN — Medication 250 MG: at 17:19

## 2025-03-08 RX ADMIN — INSULIN LISPRO 8 UNITS: 100 INJECTION, SOLUTION INTRAVENOUS; SUBCUTANEOUS at 17:18

## 2025-03-08 RX ADMIN — INSULIN LISPRO 10 UNITS: 100 INJECTION, SOLUTION INTRAVENOUS; SUBCUTANEOUS at 19:40

## 2025-03-08 RX ADMIN — HYDROMORPHONE HYDROCHLORIDE 0.5 MG: 1 INJECTION, SOLUTION INTRAMUSCULAR; INTRAVENOUS; SUBCUTANEOUS at 15:11

## 2025-03-08 RX ADMIN — DIPHENHYDRAMINE HYDROCHLORIDE 25 MG: 25 TABLET ORAL at 21:46

## 2025-03-08 RX ADMIN — CEFAZOLIN 2000 MG: 2 INJECTION, POWDER, FOR SOLUTION INTRAMUSCULAR; INTRAVENOUS at 08:26

## 2025-03-08 RX ADMIN — ASPIRIN 81 MG: 81 TABLET, COATED ORAL at 21:41

## 2025-03-08 RX ADMIN — OXYCODONE HYDROCHLORIDE 5 MG: 5 TABLET ORAL at 21:46

## 2025-03-08 RX ADMIN — CALCIUM ACETATE 667 MG: 667 CAPSULE ORAL at 08:45

## 2025-03-08 RX ADMIN — INSULIN LISPRO 6 UNITS: 100 INJECTION, SOLUTION INTRAVENOUS; SUBCUTANEOUS at 05:54

## 2025-03-08 ASSESSMENT — PAIN SCALES - GENERAL
PAINLEVEL_OUTOF10: 8
PAINLEVEL_OUTOF10: 7
PAINLEVEL_OUTOF10: 9
PAINLEVEL_OUTOF10: 7
PAINLEVEL_OUTOF10: 0
PAINLEVEL_OUTOF10: 7
PAINLEVEL_OUTOF10: 8
PAINLEVEL_OUTOF10: 10
PAINLEVEL_OUTOF10: 0
PAINLEVEL_OUTOF10: 3
PAINLEVEL_OUTOF10: 8
PAINLEVEL_OUTOF10: 4

## 2025-03-08 ASSESSMENT — PAIN DESCRIPTION - LOCATION
LOCATION: ANKLE
LOCATION: GENERALIZED;LEG
LOCATION: ANKLE
LOCATION: ANKLE;FOOT

## 2025-03-08 ASSESSMENT — PAIN DESCRIPTION - ORIENTATION
ORIENTATION: LEFT

## 2025-03-08 ASSESSMENT — PAIN DESCRIPTION - FREQUENCY: FREQUENCY: CONTINUOUS

## 2025-03-08 ASSESSMENT — PAIN DESCRIPTION - DESCRIPTORS
DESCRIPTORS: ACHING;DISCOMFORT;TENDER
DESCRIPTORS: ACHING;DISCOMFORT;SORE
DESCRIPTORS: ACHING;DISCOMFORT;TENDER;SORE
DESCRIPTORS: ACHING;DISCOMFORT;SORE
DESCRIPTORS: ACHING;DISCOMFORT;SORE
DESCRIPTORS: ACHING;DISCOMFORT

## 2025-03-08 ASSESSMENT — PAIN DESCRIPTION - PAIN TYPE: TYPE: ACUTE PAIN

## 2025-03-08 ASSESSMENT — PAIN - FUNCTIONAL ASSESSMENT
PAIN_FUNCTIONAL_ASSESSMENT: PREVENTS OR INTERFERES SOME ACTIVE ACTIVITIES AND ADLS
PAIN_FUNCTIONAL_ASSESSMENT: ACTIVITIES ARE NOT PREVENTED
PAIN_FUNCTIONAL_ASSESSMENT: ACTIVITIES ARE NOT PREVENTED
PAIN_FUNCTIONAL_ASSESSMENT: PREVENTS OR INTERFERES SOME ACTIVE ACTIVITIES AND ADLS

## 2025-03-08 ASSESSMENT — PAIN DESCRIPTION - ONSET: ONSET: ON-GOING

## 2025-03-09 LAB
ALBUMIN SERPL-MCNC: 3.2 G/DL (ref 3.5–5.2)
ALP SERPL-CCNC: 96 U/L (ref 35–104)
ALT SERPL-CCNC: <5 U/L (ref 0–32)
ANION GAP SERPL CALCULATED.3IONS-SCNC: 17 MMOL/L (ref 7–16)
AST SERPL-CCNC: 16 U/L (ref 0–31)
BILIRUB SERPL-MCNC: 0.2 MG/DL (ref 0–1.2)
BUN SERPL-MCNC: 26 MG/DL (ref 6–20)
CALCIUM SERPL-MCNC: 9.2 MG/DL (ref 8.6–10.2)
CHLORIDE SERPL-SCNC: 99 MMOL/L (ref 98–107)
CO2 SERPL-SCNC: 23 MMOL/L (ref 22–29)
CREAT SERPL-MCNC: 6.3 MG/DL (ref 0.5–1)
ERYTHROCYTE [DISTWIDTH] IN BLOOD BY AUTOMATED COUNT: 14.6 % (ref 11.5–15)
GFR, ESTIMATED: 8 ML/MIN/1.73M2
GLUCOSE BLD-MCNC: 101 MG/DL (ref 74–99)
GLUCOSE BLD-MCNC: 126 MG/DL (ref 74–99)
GLUCOSE BLD-MCNC: 261 MG/DL (ref 74–99)
GLUCOSE BLD-MCNC: 296 MG/DL (ref 74–99)
GLUCOSE SERPL-MCNC: 64 MG/DL (ref 74–99)
HCT VFR BLD AUTO: 21.8 % (ref 34–48)
HGB BLD-MCNC: 7.4 G/DL (ref 11.5–15.5)
MAGNESIUM SERPL-MCNC: 1.9 MG/DL (ref 1.6–2.6)
MCH RBC QN AUTO: 30 PG (ref 26–35)
MCHC RBC AUTO-ENTMCNC: 33.9 G/DL (ref 32–34.5)
MCV RBC AUTO: 88.3 FL (ref 80–99.9)
PHOSPHATE SERPL-MCNC: 2.5 MG/DL (ref 2.5–4.5)
PLATELET # BLD AUTO: 269 K/UL (ref 130–450)
PMV BLD AUTO: 9.8 FL (ref 7–12)
POTASSIUM SERPL-SCNC: 3.8 MMOL/L (ref 3.5–5)
PROT SERPL-MCNC: 6.6 G/DL (ref 6.4–8.3)
RBC # BLD AUTO: 2.47 M/UL (ref 3.5–5.5)
SODIUM SERPL-SCNC: 139 MMOL/L (ref 132–146)
WBC OTHER # BLD: 8 K/UL (ref 4.5–11.5)

## 2025-03-09 PROCEDURE — 6370000000 HC RX 637 (ALT 250 FOR IP)

## 2025-03-09 PROCEDURE — 2700000000 HC OXYGEN THERAPY PER DAY

## 2025-03-09 PROCEDURE — 6360000002 HC RX W HCPCS: Performed by: STUDENT IN AN ORGANIZED HEALTH CARE EDUCATION/TRAINING PROGRAM

## 2025-03-09 PROCEDURE — 84100 ASSAY OF PHOSPHORUS: CPT

## 2025-03-09 PROCEDURE — 85027 COMPLETE CBC AUTOMATED: CPT

## 2025-03-09 PROCEDURE — 82962 GLUCOSE BLOOD TEST: CPT

## 2025-03-09 PROCEDURE — 80053 COMPREHEN METABOLIC PANEL: CPT

## 2025-03-09 PROCEDURE — 36415 COLL VENOUS BLD VENIPUNCTURE: CPT

## 2025-03-09 PROCEDURE — 6370000000 HC RX 637 (ALT 250 FOR IP): Performed by: STUDENT IN AN ORGANIZED HEALTH CARE EDUCATION/TRAINING PROGRAM

## 2025-03-09 PROCEDURE — 6360000002 HC RX W HCPCS: Performed by: INTERNAL MEDICINE

## 2025-03-09 PROCEDURE — 99232 SBSQ HOSP IP/OBS MODERATE 35: CPT | Performed by: INTERNAL MEDICINE

## 2025-03-09 PROCEDURE — 2500000003 HC RX 250 WO HCPCS: Performed by: STUDENT IN AN ORGANIZED HEALTH CARE EDUCATION/TRAINING PROGRAM

## 2025-03-09 PROCEDURE — 83735 ASSAY OF MAGNESIUM: CPT

## 2025-03-09 PROCEDURE — 2060000000 HC ICU INTERMEDIATE R&B

## 2025-03-09 PROCEDURE — 6370000000 HC RX 637 (ALT 250 FOR IP): Performed by: INTERNAL MEDICINE

## 2025-03-09 RX ORDER — ASPIRIN 81 MG/1
81 TABLET ORAL DAILY
Status: DISCONTINUED | OUTPATIENT
Start: 2025-03-10 | End: 2025-03-11 | Stop reason: HOSPADM

## 2025-03-09 RX ORDER — CLOPIDOGREL BISULFATE 75 MG/1
75 TABLET ORAL DAILY
Status: DISCONTINUED | OUTPATIENT
Start: 2025-03-09 | End: 2025-03-11 | Stop reason: HOSPADM

## 2025-03-09 RX ORDER — SENNOSIDES A AND B 8.6 MG/1
1 TABLET, FILM COATED ORAL DAILY
Status: DISCONTINUED | OUTPATIENT
Start: 2025-03-09 | End: 2025-03-11 | Stop reason: HOSPADM

## 2025-03-09 RX ADMIN — SUCRALFATE 1 G: 1 TABLET ORAL at 05:48

## 2025-03-09 RX ADMIN — OXYCODONE HYDROCHLORIDE 5 MG: 5 TABLET ORAL at 03:34

## 2025-03-09 RX ADMIN — SUCRALFATE 1 G: 1 TABLET ORAL at 18:24

## 2025-03-09 RX ADMIN — INSULIN LISPRO 4 UNITS: 100 INJECTION, SOLUTION INTRAVENOUS; SUBCUTANEOUS at 20:14

## 2025-03-09 RX ADMIN — INSULIN LISPRO 4 UNITS: 100 INJECTION, SOLUTION INTRAVENOUS; SUBCUTANEOUS at 18:24

## 2025-03-09 RX ADMIN — HEPARIN SODIUM 5000 UNITS: 5000 INJECTION INTRAVENOUS; SUBCUTANEOUS at 18:24

## 2025-03-09 RX ADMIN — SUCRALFATE 1 G: 1 TABLET ORAL at 20:14

## 2025-03-09 RX ADMIN — CLOPIDOGREL BISULFATE 75 MG: 75 TABLET, FILM COATED ORAL at 12:11

## 2025-03-09 RX ADMIN — MULTIVITAMIN TABLET 1 TABLET: TABLET at 08:43

## 2025-03-09 RX ADMIN — HYDROMORPHONE HYDROCHLORIDE 0.5 MG: 1 INJECTION, SOLUTION INTRAMUSCULAR; INTRAVENOUS; SUBCUTANEOUS at 22:57

## 2025-03-09 RX ADMIN — PANTOPRAZOLE SODIUM 40 MG: 40 INJECTION, POWDER, FOR SOLUTION INTRAVENOUS at 08:35

## 2025-03-09 RX ADMIN — SUCRALFATE 1 G: 1 TABLET ORAL at 12:11

## 2025-03-09 RX ADMIN — OXYCODONE HYDROCHLORIDE 5 MG: 5 TABLET ORAL at 07:37

## 2025-03-09 RX ADMIN — INSULIN LISPRO 4 UNITS: 100 INJECTION, SOLUTION INTRAVENOUS; SUBCUTANEOUS at 08:35

## 2025-03-09 RX ADMIN — SODIUM CHLORIDE, PRESERVATIVE FREE 10 ML: 5 INJECTION INTRAVENOUS at 20:15

## 2025-03-09 RX ADMIN — ASPIRIN 81 MG: 81 TABLET, COATED ORAL at 08:34

## 2025-03-09 RX ADMIN — HYDROMORPHONE HYDROCHLORIDE 0.5 MG: 1 INJECTION, SOLUTION INTRAMUSCULAR; INTRAVENOUS; SUBCUTANEOUS at 09:46

## 2025-03-09 RX ADMIN — SODIUM CHLORIDE, PRESERVATIVE FREE 10 ML: 5 INJECTION INTRAVENOUS at 08:36

## 2025-03-09 RX ADMIN — INSULIN GLARGINE 10 UNITS: 100 INJECTION, SOLUTION SUBCUTANEOUS at 20:14

## 2025-03-09 RX ADMIN — SODIUM CHLORIDE, PRESERVATIVE FREE 10 ML: 5 INJECTION INTRAVENOUS at 05:49

## 2025-03-09 RX ADMIN — Medication 250 MG: at 08:35

## 2025-03-09 RX ADMIN — INSULIN LISPRO 4 UNITS: 100 INJECTION, SOLUTION INTRAVENOUS; SUBCUTANEOUS at 12:11

## 2025-03-09 RX ADMIN — HEPARIN SODIUM 5000 UNITS: 5000 INJECTION INTRAVENOUS; SUBCUTANEOUS at 08:35

## 2025-03-09 RX ADMIN — CARVEDILOL 25 MG: 25 TABLET, FILM COATED ORAL at 08:34

## 2025-03-09 RX ADMIN — OXYCODONE HYDROCHLORIDE 5 MG: 5 TABLET ORAL at 20:13

## 2025-03-09 RX ADMIN — PANTOPRAZOLE SODIUM 40 MG: 40 INJECTION, POWDER, FOR SOLUTION INTRAVENOUS at 20:14

## 2025-03-09 RX ADMIN — OXYCODONE HYDROCHLORIDE 5 MG: 5 TABLET ORAL at 12:11

## 2025-03-09 RX ADMIN — HYDROMORPHONE HYDROCHLORIDE 0.5 MG: 1 INJECTION, SOLUTION INTRAMUSCULAR; INTRAVENOUS; SUBCUTANEOUS at 00:09

## 2025-03-09 RX ADMIN — HYDROMORPHONE HYDROCHLORIDE 0.5 MG: 1 INJECTION, SOLUTION INTRAMUSCULAR; INTRAVENOUS; SUBCUTANEOUS at 13:49

## 2025-03-09 RX ADMIN — HYDROMORPHONE HYDROCHLORIDE 0.5 MG: 1 INJECTION, SOLUTION INTRAMUSCULAR; INTRAVENOUS; SUBCUTANEOUS at 05:48

## 2025-03-09 RX ADMIN — HYDROMORPHONE HYDROCHLORIDE 0.5 MG: 1 INJECTION, SOLUTION INTRAMUSCULAR; INTRAVENOUS; SUBCUTANEOUS at 18:25

## 2025-03-09 RX ADMIN — SENNOSIDES 8.6 MG: 8.6 TABLET, FILM COATED ORAL at 12:15

## 2025-03-09 RX ADMIN — HEPARIN SODIUM 5000 UNITS: 5000 INJECTION INTRAVENOUS; SUBCUTANEOUS at 00:08

## 2025-03-09 ASSESSMENT — PAIN DESCRIPTION - LOCATION
LOCATION: LEG
LOCATION: ANKLE;FOOT
LOCATION: ANKLE
LOCATION: FOOT
LOCATION: FOOT
LOCATION: LEG
LOCATION: ANKLE;FOOT
LOCATION: LEG

## 2025-03-09 ASSESSMENT — PAIN DESCRIPTION - DESCRIPTORS
DESCRIPTORS: ACHING;TIGHTNESS;STABBING
DESCRIPTORS: ACHING;DISCOMFORT
DESCRIPTORS: ACHING;DISCOMFORT;SORE;TENDER
DESCRIPTORS: ACHING;DISCOMFORT;SORE
DESCRIPTORS: DISCOMFORT;ACHING
DESCRIPTORS: ACHING;SHARP;TIGHTNESS
DESCRIPTORS: ACHING;DISCOMFORT
DESCRIPTORS: TIGHTNESS;SQUEEZING;SHARP
DESCRIPTORS: ACHING;DISCOMFORT;SORE
DESCRIPTORS: ACHING;SHARP;THROBBING;TIGHTNESS

## 2025-03-09 ASSESSMENT — PAIN SCALES - GENERAL
PAINLEVEL_OUTOF10: 10
PAINLEVEL_OUTOF10: 7
PAINLEVEL_OUTOF10: 10
PAINLEVEL_OUTOF10: 4
PAINLEVEL_OUTOF10: 10
PAINLEVEL_OUTOF10: 9
PAINLEVEL_OUTOF10: 0
PAINLEVEL_OUTOF10: 8
PAINLEVEL_OUTOF10: 7
PAINLEVEL_OUTOF10: 4
PAINLEVEL_OUTOF10: 8
PAINLEVEL_OUTOF10: 4

## 2025-03-09 ASSESSMENT — PAIN DESCRIPTION - ORIENTATION
ORIENTATION: LEFT

## 2025-03-09 ASSESSMENT — PAIN - FUNCTIONAL ASSESSMENT
PAIN_FUNCTIONAL_ASSESSMENT: ACTIVITIES ARE NOT PREVENTED

## 2025-03-10 LAB
ALBUMIN SERPL-MCNC: 3.3 G/DL (ref 3.5–5.2)
ALBUMIN SERPL-MCNC: 3.4 G/DL (ref 3.5–5.2)
ALP SERPL-CCNC: 106 U/L (ref 35–104)
ALP SERPL-CCNC: 106 U/L (ref 35–104)
ALT SERPL-CCNC: <5 U/L (ref 0–32)
ALT SERPL-CCNC: <5 U/L (ref 0–32)
ANION GAP SERPL CALCULATED.3IONS-SCNC: 14 MMOL/L (ref 7–16)
ANION GAP SERPL CALCULATED.3IONS-SCNC: 15 MMOL/L (ref 7–16)
AST SERPL-CCNC: 15 U/L (ref 0–31)
AST SERPL-CCNC: 17 U/L (ref 0–31)
BILIRUB SERPL-MCNC: 0.2 MG/DL (ref 0–1.2)
BILIRUB SERPL-MCNC: 0.2 MG/DL (ref 0–1.2)
BUN SERPL-MCNC: 40 MG/DL (ref 6–20)
BUN SERPL-MCNC: 40 MG/DL (ref 6–20)
CALCIUM SERPL-MCNC: 9.2 MG/DL (ref 8.6–10.2)
CALCIUM SERPL-MCNC: 9.3 MG/DL (ref 8.6–10.2)
CHLORIDE SERPL-SCNC: 100 MMOL/L (ref 98–107)
CHLORIDE SERPL-SCNC: 101 MMOL/L (ref 98–107)
CO2 SERPL-SCNC: 24 MMOL/L (ref 22–29)
CO2 SERPL-SCNC: 24 MMOL/L (ref 22–29)
CREAT SERPL-MCNC: 8.7 MG/DL (ref 0.5–1)
CREAT SERPL-MCNC: 9 MG/DL (ref 0.5–1)
ERYTHROCYTE [DISTWIDTH] IN BLOOD BY AUTOMATED COUNT: 14.7 % (ref 11.5–15)
GFR, ESTIMATED: 5 ML/MIN/1.73M2
GFR, ESTIMATED: 5 ML/MIN/1.73M2
GLUCOSE BLD-MCNC: 100 MG/DL (ref 74–99)
GLUCOSE BLD-MCNC: 114 MG/DL (ref 74–99)
GLUCOSE BLD-MCNC: 245 MG/DL (ref 74–99)
GLUCOSE BLD-MCNC: 253 MG/DL (ref 74–99)
GLUCOSE SERPL-MCNC: 86 MG/DL (ref 74–99)
GLUCOSE SERPL-MCNC: 94 MG/DL (ref 74–99)
HCT VFR BLD AUTO: 22.6 % (ref 34–48)
HGB BLD-MCNC: 7.4 G/DL (ref 11.5–15.5)
MAGNESIUM SERPL-MCNC: 2 MG/DL (ref 1.6–2.6)
MCH RBC QN AUTO: 29.7 PG (ref 26–35)
MCHC RBC AUTO-ENTMCNC: 32.7 G/DL (ref 32–34.5)
MCV RBC AUTO: 90.8 FL (ref 80–99.9)
PHOSPHATE SERPL-MCNC: 3.1 MG/DL (ref 2.5–4.5)
PLATELET # BLD AUTO: 274 K/UL (ref 130–450)
PMV BLD AUTO: 9.7 FL (ref 7–12)
POTASSIUM SERPL-SCNC: 4.4 MMOL/L (ref 3.5–5)
POTASSIUM SERPL-SCNC: 4.5 MMOL/L (ref 3.5–5)
PROT SERPL-MCNC: 6.3 G/DL (ref 6.4–8.3)
PROT SERPL-MCNC: 6.4 G/DL (ref 6.4–8.3)
RBC # BLD AUTO: 2.49 M/UL (ref 3.5–5.5)
SODIUM SERPL-SCNC: 139 MMOL/L (ref 132–146)
SODIUM SERPL-SCNC: 139 MMOL/L (ref 132–146)
WBC OTHER # BLD: 7.5 K/UL (ref 4.5–11.5)

## 2025-03-10 PROCEDURE — 80053 COMPREHEN METABOLIC PANEL: CPT

## 2025-03-10 PROCEDURE — 6370000000 HC RX 637 (ALT 250 FOR IP): Performed by: INTERNAL MEDICINE

## 2025-03-10 PROCEDURE — 2500000003 HC RX 250 WO HCPCS: Performed by: STUDENT IN AN ORGANIZED HEALTH CARE EDUCATION/TRAINING PROGRAM

## 2025-03-10 PROCEDURE — 99232 SBSQ HOSP IP/OBS MODERATE 35: CPT | Performed by: INTERNAL MEDICINE

## 2025-03-10 PROCEDURE — 6360000002 HC RX W HCPCS: Performed by: INTERNAL MEDICINE

## 2025-03-10 PROCEDURE — 6360000002 HC RX W HCPCS: Performed by: STUDENT IN AN ORGANIZED HEALTH CARE EDUCATION/TRAINING PROGRAM

## 2025-03-10 PROCEDURE — 6370000000 HC RX 637 (ALT 250 FOR IP): Performed by: STUDENT IN AN ORGANIZED HEALTH CARE EDUCATION/TRAINING PROGRAM

## 2025-03-10 PROCEDURE — 84100 ASSAY OF PHOSPHORUS: CPT

## 2025-03-10 PROCEDURE — 1200000000 HC SEMI PRIVATE

## 2025-03-10 PROCEDURE — 83735 ASSAY OF MAGNESIUM: CPT

## 2025-03-10 PROCEDURE — 82962 GLUCOSE BLOOD TEST: CPT

## 2025-03-10 PROCEDURE — 85027 COMPLETE CBC AUTOMATED: CPT

## 2025-03-10 PROCEDURE — 36415 COLL VENOUS BLD VENIPUNCTURE: CPT

## 2025-03-10 PROCEDURE — 90935 HEMODIALYSIS ONE EVALUATION: CPT

## 2025-03-10 PROCEDURE — 2700000000 HC OXYGEN THERAPY PER DAY

## 2025-03-10 RX ORDER — HYDROCODONE BITARTRATE AND ACETAMINOPHEN 5; 325 MG/1; MG/1
1 TABLET ORAL EVERY 6 HOURS PRN
Status: DISCONTINUED | OUTPATIENT
Start: 2025-03-10 | End: 2025-03-11 | Stop reason: HOSPADM

## 2025-03-10 RX ADMIN — SENNOSIDES 8.6 MG: 8.6 TABLET, FILM COATED ORAL at 11:51

## 2025-03-10 RX ADMIN — HYDROMORPHONE HYDROCHLORIDE 0.25 MG: 1 INJECTION, SOLUTION INTRAMUSCULAR; INTRAVENOUS; SUBCUTANEOUS at 22:25

## 2025-03-10 RX ADMIN — INSULIN GLARGINE 10 UNITS: 100 INJECTION, SOLUTION SUBCUTANEOUS at 22:18

## 2025-03-10 RX ADMIN — INSULIN LISPRO 4 UNITS: 100 INJECTION, SOLUTION INTRAVENOUS; SUBCUTANEOUS at 22:22

## 2025-03-10 RX ADMIN — SUCRALFATE 1 G: 1 TABLET ORAL at 11:51

## 2025-03-10 RX ADMIN — HEPARIN SODIUM 5000 UNITS: 5000 INJECTION INTRAVENOUS; SUBCUTANEOUS at 00:16

## 2025-03-10 RX ADMIN — HYDROMORPHONE HYDROCHLORIDE 0.25 MG: 1 INJECTION, SOLUTION INTRAMUSCULAR; INTRAVENOUS; SUBCUTANEOUS at 14:02

## 2025-03-10 RX ADMIN — MULTIVITAMIN TABLET 1 TABLET: TABLET at 11:51

## 2025-03-10 RX ADMIN — INSULIN LISPRO 4 UNITS: 100 INJECTION, SOLUTION INTRAVENOUS; SUBCUTANEOUS at 11:55

## 2025-03-10 RX ADMIN — CLOPIDOGREL BISULFATE 75 MG: 75 TABLET, FILM COATED ORAL at 11:51

## 2025-03-10 RX ADMIN — SODIUM CHLORIDE, PRESERVATIVE FREE 10 ML: 5 INJECTION INTRAVENOUS at 22:25

## 2025-03-10 RX ADMIN — PANTOPRAZOLE SODIUM 40 MG: 40 INJECTION, POWDER, FOR SOLUTION INTRAVENOUS at 11:51

## 2025-03-10 RX ADMIN — CARVEDILOL 25 MG: 25 TABLET, FILM COATED ORAL at 11:51

## 2025-03-10 RX ADMIN — Medication 3 MG: at 00:16

## 2025-03-10 RX ADMIN — ASPIRIN 81 MG: 81 TABLET, COATED ORAL at 11:50

## 2025-03-10 RX ADMIN — DIPHENHYDRAMINE HYDROCHLORIDE 25 MG: 25 TABLET ORAL at 00:16

## 2025-03-10 RX ADMIN — SUCRALFATE 1 G: 1 TABLET ORAL at 05:31

## 2025-03-10 RX ADMIN — OXYCODONE HYDROCHLORIDE 5 MG: 5 TABLET ORAL at 11:51

## 2025-03-10 RX ADMIN — INSULIN LISPRO 2 UNITS: 100 INJECTION, SOLUTION INTRAVENOUS; SUBCUTANEOUS at 17:13

## 2025-03-10 RX ADMIN — SODIUM CHLORIDE, PRESERVATIVE FREE 10 ML: 5 INJECTION INTRAVENOUS at 11:52

## 2025-03-10 RX ADMIN — SUCRALFATE 1 G: 1 TABLET ORAL at 17:13

## 2025-03-10 RX ADMIN — HYDROMORPHONE HYDROCHLORIDE 0.5 MG: 1 INJECTION, SOLUTION INTRAMUSCULAR; INTRAVENOUS; SUBCUTANEOUS at 06:18

## 2025-03-10 RX ADMIN — SUCRALFATE 1 G: 1 TABLET ORAL at 22:25

## 2025-03-10 RX ADMIN — HEPARIN SODIUM 5000 UNITS: 5000 INJECTION INTRAVENOUS; SUBCUTANEOUS at 17:13

## 2025-03-10 RX ADMIN — INSULIN LISPRO 4 UNITS: 100 INJECTION, SOLUTION INTRAVENOUS; SUBCUTANEOUS at 17:14

## 2025-03-10 RX ADMIN — OXYCODONE HYDROCHLORIDE 5 MG: 5 TABLET ORAL at 03:35

## 2025-03-10 RX ADMIN — HYDROCODONE BITARTRATE AND ACETAMINOPHEN 1 TABLET: 5; 325 TABLET ORAL at 17:13

## 2025-03-10 ASSESSMENT — PAIN DESCRIPTION - DESCRIPTORS
DESCRIPTORS: ACHING;SHARP;TIGHTNESS
DESCRIPTORS: ACHING;THROBBING;TIGHTNESS
DESCRIPTORS: ACHING;DISCOMFORT;THROBBING
DESCRIPTORS: ACHING;DISCOMFORT;THROBBING
DESCRIPTORS: ACHING;DISCOMFORT
DESCRIPTORS: DISCOMFORT;ACHING
DESCRIPTORS: ACHING;TIGHTNESS

## 2025-03-10 ASSESSMENT — PAIN DESCRIPTION - ORIENTATION
ORIENTATION: LEFT

## 2025-03-10 ASSESSMENT — PAIN - FUNCTIONAL ASSESSMENT
PAIN_FUNCTIONAL_ASSESSMENT: PREVENTS OR INTERFERES SOME ACTIVE ACTIVITIES AND ADLS
PAIN_FUNCTIONAL_ASSESSMENT: ACTIVITIES ARE NOT PREVENTED
PAIN_FUNCTIONAL_ASSESSMENT: PREVENTS OR INTERFERES SOME ACTIVE ACTIVITIES AND ADLS

## 2025-03-10 ASSESSMENT — PAIN DESCRIPTION - LOCATION
LOCATION: ANKLE
LOCATION: LEG;FOOT
LOCATION: LEG;FOOT
LOCATION: FOOT;LEG
LOCATION: LEG;FOOT
LOCATION: LEG
LOCATION: FOOT;LEG

## 2025-03-10 ASSESSMENT — PAIN SCALES - GENERAL
PAINLEVEL_OUTOF10: 10
PAINLEVEL_OUTOF10: 3
PAINLEVEL_OUTOF10: 2
PAINLEVEL_OUTOF10: 8
PAINLEVEL_OUTOF10: 4
PAINLEVEL_OUTOF10: 8
PAINLEVEL_OUTOF10: 8
PAINLEVEL_OUTOF10: 10
PAINLEVEL_OUTOF10: 10

## 2025-03-10 ASSESSMENT — PAIN DESCRIPTION - ONSET: ONSET: ON-GOING

## 2025-03-10 ASSESSMENT — PAIN DESCRIPTION - PAIN TYPE: TYPE: ACUTE PAIN

## 2025-03-10 ASSESSMENT — PAIN DESCRIPTION - FREQUENCY: FREQUENCY: CONTINUOUS

## 2025-03-10 NOTE — FLOWSHEET NOTE
03/10/25 1036   Vital Signs   BP (!) 187/90   Temp 97.1 °F (36.2 °C)   Pulse 98   Respirations 16   Weight - Scale 71 kg (156 lb 8.4 oz)   Percent Weight Change -11.06   Post-Hemodialysis Assessment   Post-Treatment Procedures Blood returned;Access bleeding time < 10 minutes   Machine Disinfection Process Exterior Machine Disinfection   Rinseback Volume (ml) 300 ml   Blood Volume Processed (Liters) 89.1 L   Dialyzer Clearance Lightly streaked   Duration of Treatment (minutes) 240 minutes   Heparin Amount Administered During Treatment (mL) 0 mL   Hemodialysis Intake (ml) 300 ml   Hemodialysis Output (ml) 2300 ml   NET Removed (ml) 2000   Tolerated Treatment Good   Patient Response to Treatment tolerated well, blood returned, needles pulled, sites held, stasis achieved, bandaids applied, +thirll, +bruit

## 2025-03-11 VITALS
TEMPERATURE: 98.4 F | WEIGHT: 156.53 LBS | RESPIRATION RATE: 18 BRPM | DIASTOLIC BLOOD PRESSURE: 63 MMHG | BODY MASS INDEX: 31.56 KG/M2 | SYSTOLIC BLOOD PRESSURE: 146 MMHG | OXYGEN SATURATION: 100 % | HEIGHT: 59 IN | HEART RATE: 94 BPM

## 2025-03-11 LAB
GLUCOSE BLD-MCNC: 159 MG/DL (ref 74–99)
GLUCOSE BLD-MCNC: 213 MG/DL (ref 74–99)
GLUCOSE BLD-MCNC: 216 MG/DL (ref 74–99)
GLUCOSE BLD-MCNC: 96 MG/DL (ref 74–99)

## 2025-03-11 PROCEDURE — 6370000000 HC RX 637 (ALT 250 FOR IP): Performed by: STUDENT IN AN ORGANIZED HEALTH CARE EDUCATION/TRAINING PROGRAM

## 2025-03-11 PROCEDURE — 97535 SELF CARE MNGMENT TRAINING: CPT

## 2025-03-11 PROCEDURE — 6360000002 HC RX W HCPCS: Performed by: STUDENT IN AN ORGANIZED HEALTH CARE EDUCATION/TRAINING PROGRAM

## 2025-03-11 PROCEDURE — 99239 HOSP IP/OBS DSCHRG MGMT >30: CPT | Performed by: STUDENT IN AN ORGANIZED HEALTH CARE EDUCATION/TRAINING PROGRAM

## 2025-03-11 PROCEDURE — 97530 THERAPEUTIC ACTIVITIES: CPT

## 2025-03-11 PROCEDURE — 6370000000 HC RX 637 (ALT 250 FOR IP): Performed by: INTERNAL MEDICINE

## 2025-03-11 PROCEDURE — 2500000003 HC RX 250 WO HCPCS: Performed by: STUDENT IN AN ORGANIZED HEALTH CARE EDUCATION/TRAINING PROGRAM

## 2025-03-11 PROCEDURE — 6360000002 HC RX W HCPCS: Performed by: INTERNAL MEDICINE

## 2025-03-11 PROCEDURE — 82962 GLUCOSE BLOOD TEST: CPT

## 2025-03-11 RX ORDER — LIDOCAINE 4 G/G
1 PATCH TOPICAL DAILY
Qty: 7 EACH | Refills: 0 | Status: SHIPPED | OUTPATIENT
Start: 2025-03-11 | End: 2025-03-11

## 2025-03-11 RX ORDER — HYDROCODONE BITARTRATE AND ACETAMINOPHEN 5; 325 MG/1; MG/1
1 TABLET ORAL EVERY 8 HOURS PRN
Qty: 9 TABLET | Refills: 0 | Status: SHIPPED | OUTPATIENT
Start: 2025-03-11 | End: 2025-03-11

## 2025-03-11 RX ORDER — HYDROCODONE BITARTRATE AND ACETAMINOPHEN 5; 325 MG/1; MG/1
1 TABLET ORAL EVERY 6 HOURS PRN
Qty: 9 TABLET | Refills: 0 | Status: ON HOLD | OUTPATIENT
Start: 2025-03-11 | End: 2025-03-14

## 2025-03-11 RX ORDER — LIDOCAINE 4 G/G
1 PATCH TOPICAL DAILY
Qty: 7 EACH | Refills: 0 | Status: ON HOLD | OUTPATIENT
Start: 2025-03-11 | End: 2025-04-10

## 2025-03-11 RX ADMIN — HYDROMORPHONE HYDROCHLORIDE 0.25 MG: 1 INJECTION, SOLUTION INTRAMUSCULAR; INTRAVENOUS; SUBCUTANEOUS at 09:30

## 2025-03-11 RX ADMIN — HYDROCODONE BITARTRATE AND ACETAMINOPHEN 1 TABLET: 5; 325 TABLET ORAL at 20:15

## 2025-03-11 RX ADMIN — Medication 3 MG: at 00:14

## 2025-03-11 RX ADMIN — HYDROCODONE BITARTRATE AND ACETAMINOPHEN 1 TABLET: 5; 325 TABLET ORAL at 12:05

## 2025-03-11 RX ADMIN — SODIUM CHLORIDE, PRESERVATIVE FREE 10 ML: 5 INJECTION INTRAVENOUS at 20:16

## 2025-03-11 RX ADMIN — CLOPIDOGREL BISULFATE 75 MG: 75 TABLET, FILM COATED ORAL at 09:27

## 2025-03-11 RX ADMIN — HYDROMORPHONE HYDROCHLORIDE 0.25 MG: 1 INJECTION, SOLUTION INTRAMUSCULAR; INTRAVENOUS; SUBCUTANEOUS at 17:14

## 2025-03-11 RX ADMIN — PANTOPRAZOLE SODIUM 40 MG: 40 INJECTION, POWDER, FOR SOLUTION INTRAVENOUS at 09:28

## 2025-03-11 RX ADMIN — HYDROCODONE BITARTRATE AND ACETAMINOPHEN 1 TABLET: 5; 325 TABLET ORAL at 00:14

## 2025-03-11 RX ADMIN — DIPHENHYDRAMINE HYDROCHLORIDE 25 MG: 25 TABLET ORAL at 20:15

## 2025-03-11 RX ADMIN — HEPARIN SODIUM 5000 UNITS: 5000 INJECTION INTRAVENOUS; SUBCUTANEOUS at 09:28

## 2025-03-11 RX ADMIN — ONDANSETRON 4 MG: 2 INJECTION, SOLUTION INTRAMUSCULAR; INTRAVENOUS at 06:25

## 2025-03-11 RX ADMIN — SENNOSIDES 8.6 MG: 8.6 TABLET, FILM COATED ORAL at 09:27

## 2025-03-11 RX ADMIN — SUCRALFATE 1 G: 1 TABLET ORAL at 17:15

## 2025-03-11 RX ADMIN — INSULIN LISPRO 2 UNITS: 100 INJECTION, SOLUTION INTRAVENOUS; SUBCUTANEOUS at 20:16

## 2025-03-11 RX ADMIN — INSULIN LISPRO 4 UNITS: 100 INJECTION, SOLUTION INTRAVENOUS; SUBCUTANEOUS at 12:07

## 2025-03-11 RX ADMIN — INSULIN LISPRO 2 UNITS: 100 INJECTION, SOLUTION INTRAVENOUS; SUBCUTANEOUS at 12:06

## 2025-03-11 RX ADMIN — INSULIN LISPRO 4 UNITS: 100 INJECTION, SOLUTION INTRAVENOUS; SUBCUTANEOUS at 09:29

## 2025-03-11 RX ADMIN — DIPHENHYDRAMINE HYDROCHLORIDE 25 MG: 25 TABLET ORAL at 00:14

## 2025-03-11 RX ADMIN — INSULIN GLARGINE 10 UNITS: 100 INJECTION, SOLUTION SUBCUTANEOUS at 20:15

## 2025-03-11 RX ADMIN — ASPIRIN 81 MG: 81 TABLET, COATED ORAL at 09:27

## 2025-03-11 RX ADMIN — SUCRALFATE 1 G: 1 TABLET ORAL at 12:04

## 2025-03-11 RX ADMIN — HEPARIN SODIUM 5000 UNITS: 5000 INJECTION INTRAVENOUS; SUBCUTANEOUS at 00:13

## 2025-03-11 RX ADMIN — PANTOPRAZOLE SODIUM 40 MG: 40 INJECTION, POWDER, FOR SOLUTION INTRAVENOUS at 20:16

## 2025-03-11 RX ADMIN — CARVEDILOL 25 MG: 25 TABLET, FILM COATED ORAL at 09:27

## 2025-03-11 RX ADMIN — SUCRALFATE 1 G: 1 TABLET ORAL at 20:15

## 2025-03-11 RX ADMIN — MULTIVITAMIN TABLET 1 TABLET: TABLET at 09:28

## 2025-03-11 ASSESSMENT — PAIN DESCRIPTION - LOCATION
LOCATION: LEG
LOCATION: ANKLE
LOCATION: LEG;ANKLE
LOCATION: LEG;FOOT
LOCATION: LEG;ANKLE
LOCATION: LEG;FOOT

## 2025-03-11 ASSESSMENT — PAIN SCALES - GENERAL
PAINLEVEL_OUTOF10: 9
PAINLEVEL_OUTOF10: 9
PAINLEVEL_OUTOF10: 8
PAINLEVEL_OUTOF10: 5
PAINLEVEL_OUTOF10: 9
PAINLEVEL_OUTOF10: 0
PAINLEVEL_OUTOF10: 0
PAINLEVEL_OUTOF10: 7
PAINLEVEL_OUTOF10: 0

## 2025-03-11 ASSESSMENT — PAIN DESCRIPTION - ORIENTATION
ORIENTATION: LEFT
ORIENTATION: RIGHT
ORIENTATION: LEFT
ORIENTATION: LEFT

## 2025-03-11 ASSESSMENT — PAIN DESCRIPTION - DESCRIPTORS
DESCRIPTORS: ACHING;DISCOMFORT;THROBBING
DESCRIPTORS: ACHING;DISCOMFORT;NAGGING
DESCRIPTORS: ACHING;SHARP;SHOOTING
DESCRIPTORS: SHARP;SHOOTING;SORE
DESCRIPTORS: SHARP;SHOOTING;SORE
DESCRIPTORS: ACHING;DISCOMFORT;THROBBING

## 2025-03-11 ASSESSMENT — PAIN DESCRIPTION - PAIN TYPE: TYPE: ACUTE PAIN

## 2025-03-11 ASSESSMENT — PAIN DESCRIPTION - FREQUENCY: FREQUENCY: CONTINUOUS

## 2025-03-11 ASSESSMENT — PAIN DESCRIPTION - ONSET: ONSET: ON-GOING

## 2025-03-11 NOTE — CARE COORDINATION
03/04/2025 PT admitted 03/02/2025  DX: acute on chronic anemia.  Pt is having an EGD today as well as HD. Pt is also scheduled for surgical removal of the external fixator on 3/6 @ 8:30. Pt request a return to [facility] so she can complete rehab. CM/ SW to follow for safe DC planning. Eliza Lindsey RN CM  
03/05/2025 CM Note: PT admitted 03/02/2025 DX: acute on chronic anemia. Pt ended up having the EGD today.  She is also scheduled for external fixator to be removed 3/6 @ 8:30.  Pt will need  PT/OT evals after WB status has been given.  Plan is for pt to return to [facility] for rehab & HD but will only have 8 days per insurance to use and then she will have to DC to home. CM/SW to follow for timing of DC. Will check tomorrow to see if facility needs to start on precert. Eliza Lindsey RN -[phone]  
03/06/2025 CM Note:  PT admitted 03/02/2025 DX: acute on chronic anemia. Pt is having the external fixator removed today.  HGB @ 7.5.  Dr Johansen is watching this carefully and states that if HGB drops below 7 then will do a colonoscopy tomorrow. EGD was normal on 3/5/25.  Per orthopedics note they are delaying the fixator removal d/t possible colonoscopy tomorrow and until stable. Plan is to return to [facility] for rehab & HD once all issues have been addressed.  CM/ SW following Eliza Lindsey RN [phone]  
03/07/2025 CM Note: PT admitted 03/02/2025 DX: acute on chronic anemia. Pt's removal of external fixator was pushed to today as a colonoscopy was going to be done yesterday but now general surgery placed that on hold. Pt was also for HD today-has been off unit most of day. PT/ OT was ordered today so that can be used for precert at facility.  Notified Haider from [facility] to start precert. Haider states that once the therapy notes are in she will submit. Eliza Lindsey RN -[phone]  
03/10/2025 CM Note:  PT admitted 03/02/2025 DX: acute on chronic anemia.  Pt had the external fixator removed.  Having HD today, then awaiting acceptance @ facility. Pt reported that she is not well controlled on her pain meds and she knows that she can get dilaudid and SNF. . Dr Fleming will eval and see if any changes can be made. Also awaiting auth form insurance to come through for DC to be carried out to [facility]. Eliza Lindsey RN -[phone]  
Care Coordination  Have received the precert for the patient to go to [facility] have perfect served Dr Riley to check on a discharge order. The patient gets hemo dialysis MWF at [facility] on [address]. The patient is sp left ankle orif and she is nwb lle. Return envelope done.    Noted discharge order the patient is set up to be picked up at 730 pm by pas ambulette. To return to [facility]. Have received the auth for the patient to return tonight.   
agrees with the discharge plan. Freedom of choice list with basic dialogue that supports the patient's individualized plan of care/goals and shares the quality data associated with the providers was provided to:     patient-she wants to return to [facility]    The Patient and/or Patient Representative Agree with the Discharge Plan?      Eliza Lindsey RN  Case Management Department  Ph: [phone]

## 2025-03-11 NOTE — PROGRESS NOTES
[place] Hospitalist Progress Note    Admitting Date and Time: 3/2/2025  3:56 PM  Admit Dx: Hyperkalemia [E87.5]  Nausea vomiting and diarrhea [R11.2, R19.7]  Acute on chronic anemia [D64.9]    Synopsis:    Michelle Nettles is a 39 y.o. female who presents to [facility] ER complaining of nausea, vomiting, diarrhea.     Michelle Nettles has a past medical history that includes ESRD on dialysis, diabetes, chronic hypoxic respiratory failure     Michelle presents to the ER with complaint of nausea, vomiting, diarrhea.  She states that her symptoms started on Thursday.  She has a history of recent trimalleolar ankle fracture. She underwent external fixation with application on 2/15. She was discharged 2/21/25. She was placed on lovenox on discharge. There is a plan for L ex fix removal 3/6. In the ER today, she was evaluated for nausea/vomiting/diarrhea. She was found to be anemic with a hemoglobin of 5.2. She does have chronic anemia between 7-8. She has a history of gastritis. FOBT and CT abdomen pelvis pending. She admits to dark colored stools but she does also take iron.     Upon presentation to the ER, routine labwork was performed which revealed sodium 131, potassium 6.0, creatinine 10.4, glucose 163, hemoglobin 5.2.  Imaging results are as outlined below in the Imaging section of this note.   Upon arrival to the ER, patient was 147/75 and tachycardic at 105.  The patient received albuterol, calcium gluconate, fentanyl, insulin and D10, Zofran in the emergency room and was admitted to [facility].    3/4: Mentation and BP much improved today.  For EGD tomorrow per general surgery.    3/5: EGD normal    3/6: For LLE external fixator removal today by orthopedic surgery.    Subjective:  Patient is being followed for Hyperkalemia [E87.5]  Nausea vomiting and diarrhea [R11.2, R19.7]  Acute on chronic anemia [D64.9]     Patient seen and examined at bedside this morning.  No complaints 
       [facility]    Admitting Date and Time: 3/2/2025  3:56 PM  Admit Dx: Hyperkalemia [E87.5]  Nausea vomiting and diarrhea [R11.2, R19.7]  Acute on chronic anemia [D64.9]    Synopsis:    Michelle Nettles is a 39 y.o. female who presents to [facility] ER complaining of nausea, vomiting, diarrhea.     Michelle Nettles has a past medical history that includes ESRD on dialysis, diabetes, chronic hypoxic respiratory failure     Michelle presents to the ER with complaint of nausea, vomiting, diarrhea.  She states that her symptoms started on Thursday.  She has a history of recent trimalleolar ankle fracture. She underwent external fixation with application on 2/15. She was discharged 2/21/25. She was placed on lovenox on discharge. There is a plan for L ex fix removal 3/6. In the ER today, she was evaluated for nausea/vomiting/diarrhea. She was found to be anemic with a hemoglobin of 5.2. She does have chronic anemia between 7-8. She has a history of gastritis. FOBT and CT abdomen pelvis pending. She admits to dark colored stools but she does also take iron.     Upon presentation to the ER, routine labwork was performed which revealed sodium 131, potassium 6.0, creatinine 10.4, glucose 163, hemoglobin 5.2.  Imaging results are as outlined below in the Imaging section of this note.   Upon arrival to the ER, patient was 147/75 and tachycardic at 105.  The patient received albuterol, calcium gluconate, fentanyl, insulin and D10, Zofran in the emergency room and was admitted to [facility].       Subjective:  Patient is being followed for Hyperkalemia [E87.5]  Nausea vomiting and diarrhea [R11.2, R19.7]  Acute on chronic anemia [D64.9]     Patient seen and examined at bedside this morning.  Complaining of fatigue and abdominal pain.  States she is having bloody stool and follows with Dr. Gonzalez with general surgery.    ROS: denies fever, chills, cp, sob, n/v, HA unless stated 
       [facility] Hospitalist Progress Note    Admitting Date and Time: 3/2/2025  3:56 PM  Admit Dx: Hyperkalemia [E87.5]  Nausea vomiting and diarrhea [R11.2, R19.7]  Acute on chronic anemia [D64.9]    Synopsis:    Michelle Nettles is a 39 y.o. female who presents to [facility] ER complaining of nausea, vomiting, diarrhea.     Michelle Nettles has a past medical history that includes ESRD on dialysis, diabetes, chronic hypoxic respiratory failure     Michelle presents to the ER with complaint of nausea, vomiting, diarrhea.  She states that her symptoms started on Thursday.  She has a history of recent trimalleolar ankle fracture. She underwent external fixation with application on 2/15. She was discharged 2/21/25. She was placed on lovenox on discharge. There is a plan for L ex fix removal 3/6. In the ER today, she was evaluated for nausea/vomiting/diarrhea. She was found to be anemic with a hemoglobin of 5.2. She does have chronic anemia between 7-8. She has a history of gastritis. FOBT and CT abdomen pelvis pending. She admits to dark colored stools but she does also take iron.     Upon presentation to the ER, routine labwork was performed which revealed sodium 131, potassium 6.0, creatinine 10.4, glucose 163, hemoglobin 5.2.  Imaging results are as outlined below in the Imaging section of this note.   Upon arrival to the ER, patient was 147/75 and tachycardic at 105.  The patient received albuterol, calcium gluconate, fentanyl, insulin and D10, Zofran in the emergency room and was admitted to [facility].    3/4: Mentation and BP much improved today.  For EGD tomorrow per general surgery.    3/5: EGD normal    3/6: LLE external fixator rescheduled by orthopedic surgery pending colonoscopy plans.      3/7: General Surgery noted that they will not need to do a colonoscopy.  S/p LLE external fixator removal by orthopedic surgery.      Subjective:  Patient is being followed for Hyperkalemia 
       [facility] Hospitalist Progress Note    Admitting Date and Time: 3/2/2025  3:56 PM  Admit Dx: Hyperkalemia [E87.5]  Nausea vomiting and diarrhea [R11.2, R19.7]  Acute on chronic anemia [D64.9]    Synopsis:    Michelle Nettles is a 39 y.o. female who presents to [facility] ER complaining of nausea, vomiting, diarrhea.     Michelle Nettles has a past medical history that includes ESRD on dialysis, diabetes, chronic hypoxic respiratory failure     Michelle presents to the ER with complaint of nausea, vomiting, diarrhea.  She states that her symptoms started on Thursday.  She has a history of recent trimalleolar ankle fracture. She underwent external fixation with application on 2/15. She was discharged 2/21/25. She was placed on lovenox on discharge. There is a plan for L ex fix removal 3/6. In the ER today, she was evaluated for nausea/vomiting/diarrhea. She was found to be anemic with a hemoglobin of 5.2. She does have chronic anemia between 7-8. She has a history of gastritis. FOBT and CT abdomen pelvis pending. She admits to dark colored stools but she does also take iron.     Upon presentation to the ER, routine labwork was performed which revealed sodium 131, potassium 6.0, creatinine 10.4, glucose 163, hemoglobin 5.2.  Imaging results are as outlined below in the Imaging section of this note.   Upon arrival to the ER, patient was 147/75 and tachycardic at 105.  The patient received albuterol, calcium gluconate, fentanyl, insulin and D10, Zofran in the emergency room and was admitted to [facility].    3/4: Mentation and BP much improved today.  For EGD tomorrow per general surgery.    Subjective:  Patient is being followed for Hyperkalemia [E87.5]  Nausea vomiting and diarrhea [R11.2, R19.7]  Acute on chronic anemia [D64.9]     Patient seen and examined at bedside this morning.  Mentation is much improved.  Complaining of left lower extremity pain.    ROS: denies fever, chills, cp, sob, 
    OCCUPATIONAL THERAPY INITIAL EVALUATION    [facility]  [address], [place]      Date:3/8/2025                                                               Patient Name: Michelle Nettles  MRN: [id number]  : 1985  Room: [facility]    Evaluating OT: Cassie Sanchez, OTR/L [id number]    Referring Provider:   izing   Crispin Felipe DO      Specific Provider Orders/Date: OT eval and treat (3/7/25)       Diagnosis: Hyperkalemia [E87.5]  Nausea vomiting and diarrhea [R11.2, R19.7]  Acute on chronic anemia [D64.9]      Surgery/Procedures: 3/5 EGD, 3/7 removal of L ankle ex fix, ORIF L ankle      Pertinent Medical History:    Past Medical History:   Diagnosis Date    JOLLY (acute kidney injury) 2016    Anemia     AVF (arteriovenous fistula) 2018    let arm    Cellulitis, face     CHF (congestive heart failure) ([facility])     Chronic kidney disease     Chronic respiratory failure with hypoxia ([facility])     Dialysis AV fistula malfunction, initial encounter 2019    Displaced trimalleolar fracture of left lower leg, closed, initial encounter 2025    DM type 1 (diabetes mellitus, type 1) ([facility])     Encounter regarding vascular access for dialysis for ESRD ([facility]) 2018    ESRD (end stage renal disease) ([facility])     Hemodialysis patient     amrita dawson  / graft in left arm    Hidradenitis suppurativa     Hypercalcemia     Hyperkalemia     Hypertension     Iron deficiency anemia secondary to blood loss (chronic)     Other acute gastritis without hemorrhage     Tobacco abuse 2016    Type 2 diabetes mellitus with hyperglycemia ([facility])     Vitamin B-complex deficiency         *Precautions:  Fall Risk, NWB LLE, bed alarm, O2 (5L)- prior to admission, dialysis (L UE fistula)    Assessment of current deficits   [x] Functional mobility  [x]ADLs  [x] Strength               []Cognition   [x] Functional transfers   [x] 
4 Eyes Skin Assessment     NAME:  Michelle Nettles  YOB: 1985  MEDICAL RECORD NUMBER:  [id number]    The patient is being assessed for  Transfer to New Unit    I agree that at least one RN has performed a thorough Head to Toe Skin Assessment on the patient. ALL assessment sites listed below have been assessed.      Areas assessed by both nurses:    Head, Face, Ears, Shoulders, Back, Chest, Arms, Elbows, Hands, Sacrum. Buttock, Coccyx, Ischium, Legs. Feet and Heels, and Under Medical Devices         Does the Patient have a Wound? No noted wound(s)       Edgar Prevention initiated by RN: No  Wound Care Orders initiated by RN: No    Pressure Injury (Stage 3,4, Unstageable, DTI, NWPT, and Complex wounds) if present, place Wound referral order by RN under : No    New Ostomies, if present place, Ostomy referral order under : No     Nurse 1 eSignature: Electronically signed by Jania Nunez RN on 3/11/25 at 5:13 AM EDT    **SHARE this note so that the co-signing nurse can place an eSignature**    Nurse 2 eSignature: {Esignature:[id number]}  
Comprehensive Nutrition Assessment    Type and Reason for Visit:  Initial, Patient education    Nutrition Recommendations/Plan:     1.) Modify Current Diet- Add CHO Control 4 carb/ meal restriction  Note glucose >500 a few days ago, remains intermittently elevated.     2.) Start Oral Nutrition Supplement & Nazario wound healing     Malnutrition Assessment:  Malnutrition Status:  At risk for malnutrition (03/10/25 1230)    Context:  Acute Illness     Findings of the 6 clinical characteristics of malnutrition:  Energy Intake:  50% or less of estimated energy requirements for 5 or more days  Weight Loss:  Unable to assess (large fluctuations likely r/t ESRD/HD)     Body Fat Loss:  No body fat loss  Muscle Mass Loss:  No muscle mass loss   Fluid Accumulation:  No fluid accumulation   Strength:  Not Performed    Nutrition Assessment:    Pt admit from ECF w/ N/V/D, concern for GIB s/p EGD 3/5 revealing mild esophagitis (no signs of active bleeding). PMHx DM, ESRD on HD, CHF,  Dietary noncompliance, & recent fall/syncopal episode resulting in trimalleolar ankle fx s/p ex fix application 2/15. Pt now s/p LLE ex fix removal this hosptial admit 3/7. PO intake average ~50%. Will add Ensure HP & Nazario BID to aid in recovery.    Nutrition Related Findings:    Pt A&Ox4, -I/O's 5L, +1/+2 edema, active BS, abd distention, N/V/D resolved, HD (K/phos WNL, Cr 9.0), hyperglycemia     Wound Type: Multiple, Surgical Incision (L ankle x 2)       Current Nutrition Intake & Therapies:    Average Meal Intake: 26-50% (average/ variable)  Diet Order: General     Anthropometric Measures:  Height: 149.9 cm (4' 11\")  Ideal Body Weight (IBW): 95 lbs (43 kg)    Admission Body Weight: 70.4 kg (155 lb 3.2 oz) (2/28 standing scale from [facility])  Current Body Weight: 71 kg (156 lb 8.4 oz) (3/10 bedscale), 164.8 % IBW.    Current BMI (kg/m2): 31.6  Usual Body Weight:  (EMR measured wt ranges 131-167lb within past year; fluctuations likely d/t ESRD/HD, 
Department of Orthopedic Surgery  Resident Progress Note    Patient seen and examined. Pain controlled. No new complaints.  Denies chest pain, shortness of breath, dizziness/lightheadedness.  Patient is still having some numbness to the foot with inability to move toes likely secondary to block    VITALS:  BP (!) 143/71   Pulse (!) 101   Temp 98 °F (36.7 °C) (Oral)   Resp 18   Ht 1.499 m (4' 11\")   Wt 72 kg (158 lb 11.7 oz)   SpO2 97%   BMI 32.06 kg/m²     General: Awake alert resting bed comfortably    MUSCULOSKELETAL:   left lower extremity:  Dressing C/D/I, splint in place  Compartments soft and compressible around splint  Patient unable to demonstrate any flexion extension of the digits likely secondary to block  Brisk Cap refill, Toes warm and perfused  Distal sensation denied to Peroneals, Sural, Saphenous, and slight sensation with paresthesias to tibial nrs likely all secondary to block from surgery    CBC:   Lab Results   Component Value Date/Time    WBC 6.0 03/07/2025 05:14 AM    HGB 7.8 03/07/2025 05:14 AM    HCT 23.0 03/07/2025 05:14 AM     03/07/2025 05:14 AM     PT/INR:    Lab Results   Component Value Date/Time    PROTIME 11.4 01/30/2024 04:20 PM    INR 1.0 01/30/2024 04:20 PM       ASSESSMENT  S/P left ankle ORIF 3-7    PLAN    Nonweightbearing left lower extremity remain in splint  Ice elevate  Multimodal pain management  Patient may resume aspirin 81 twice daily the today, patient is also appears to be on heparin  Patient to complete 24-hour antibiotic prophylaxis today  Monitor hemoglobin no hemoglobin for this morning  PT/OT  Orthopedic surgery will continue to monitor  
Department of Orthopedic Surgery  Resident Progress Note    Patient seen and examined. Pain controlled. No new complaints.  Denies chest pain, shortness of breath, dizziness/lightheadedness.  Patient states that her block did wear off and she has more significant pain to her left lower extremity however is manageable, she is able to wiggle her toes  VITALS:  BP (!) 112/55   Pulse 98   Temp 97.8 °F (36.6 °C) (Oral)   Resp 16   Ht 1.499 m (4' 11\")   Wt 79.8 kg (176 lb)   SpO2 100%   BMI 35.55 kg/m²     General: Awake alert resting bed comfortably    MUSCULOSKELETAL:   left lower extremity:  Dressing C/D/I, splint in place  Compartments soft and compressible around splint  Patient is able to demonstrate flexion extension 1st through 5th digits   brisk Cap refill, Toes warm and perfused  Distal sensation present to Peroneals, Sural, Saphenous,  tibial nrs with some's paresthesias in the superficial peroneal nerve distribution and deep peroneal nerve distribution and sural nerve distribution  CBC:   Lab Results   Component Value Date/Time    WBC 8.0 03/09/2025 04:34 AM    HGB 7.4 03/09/2025 04:34 AM    HCT 21.8 03/09/2025 04:34 AM     03/09/2025 04:34 AM     PT/INR:    Lab Results   Component Value Date/Time    PROTIME 11.4 01/30/2024 04:20 PM    INR 1.0 01/30/2024 04:20 PM       ASSESSMENT  S/P left ankle ORIF 3-7    PLAN    Nonweightbearing left lower extremity remain in splint  Ice elevate  Multimodal pain management  Patient may resume aspirin 81 twice daily the today, patient is also appears to be on heparin   24-hour antibiotic prophylaxis completed  Monitor hemoglobin no hemoglobin for this morning 7.4  PT/OT  Orthopedic surgery will continue to monitor  
Dr Osvaldo nassar served that her RN states that that Tho doesn't feel ready for DC today. In much pain and still needing Dilaudid.  
EGD consent signed and in soft chart  
Floor Called, nurse to nurse given. Spoke with receiving RN . Patients test results review, VS reported to receiving nurse. Any and all important information regarding patient disclosed.  
GENERAL SURGERY  DAILY PROGRESS NOTE    Patient's Name/Date of Birth: Michelle Nettles / 1985    Date: 2025     Chief Complaint   Patient presents with    Nausea    Vomiting    Diarrhea     N/V/D since Thursday. Ankle sx on Thursday. Has been sick since then. From [facility]. Non weight bearing to surgical ankle. Baseline is 5 L NC.         Subjective:  Patient with some abdominal pain overnight. Some nausea, no emesis. Denies any flatus or BM. Has not noticed any darker or more tarry stool.     Objective:  Last 24Hrs  Temp  Av.8 °F (36.6 °C)  Min: 97 °F (36.1 °C)  Max: 98.7 °F (37.1 °C)  Resp  Av.1  Min: 18  Max: 20  Pulse  Av.6  Min: 111  Max: 138  Systolic (24hrs), Av , Min:85 , Max:136     Diastolic (24hrs), Av, Min:40, Max:66    SpO2  Av.9 %  Min: 99 %  Max: 100 %    I/O last 3 completed shifts:  In: 2309.7 [I.V.:20.4; Blood:455; IV Piggyback:834.3]  Out: 693       General: In no acute distress, alert and oriented x4  Cardiovascular: Warm throughout, no edema  Respiratory: no respiratory distress, equal chest rise  Abdomen: soft,  mildly diffusely tender, nondistended  Skin: no obvious rashes or lesions appreciated, no jaundice  Extremities: atraumatic, no focal motor deficits, no open wounds      CBC  Recent Labs     25  1545 25  0529 25  0920 25  1236 25  2347   WBC 6.6 7.9  --   --   --    RBC 1.83* 1.78*  --   --   --    HGB 5.2* 5.2* 8.0* 7.3* 6.2*   HCT 15.8* 15.5* 23.7* 21.3* 18.1*   MCV 86.3 87.1  --   --   --    MCH 28.4 29.2  --   --   --    MCHC 32.9 33.5  --   --   --    RDW 16.4* 15.0  --   --   --     218  --   --   --    MPV 10.0 10.3  --   --   --        CMP  Recent Labs     25  1545 25  1735 25  1742 25  2117 25  0530 25  0836   *  --   --   --  128*  --    K 6.7* 6.0*  --   --  7.7* 3.7   CL 87*  --   --   --  86*  --    CO2 28  --   --   --  22  --  
GENERAL SURGERY  DAILY PROGRESS NOTE    Patient's Name/Date of Birth: Michelle Nettles / 1985    Date: 2025     Chief Complaint   Patient presents with    Nausea    Vomiting    Diarrhea     N/V/D since Thursday. Ankle sx on Thursday. Has been sick since then. From [facility]. Non weight bearing to surgical ankle. Baseline is 5 L NC.         Subjective:  Patient complaining of some mild abdominal discomfort, states that she has been extremely constipated which is much different than when she presented having diarrhea.  No nausea or vomiting.  Tolerated diet yesterday.  She did have some blood with wiping after a strenuous bowel movement yesterday, she has a history of internal hemorrhoids.    Objective:  Last 24Hrs  Temp  Av.9 °F (36.6 °C)  Min: 96.8 °F (36 °C)  Max: 98.6 °F (37 °C)  Resp  Av.4  Min: 14  Max: 21  Pulse  Av.7  Min: 57  Max: 117  Systolic (24hrs), Av , Min:110 , Max:162     Diastolic (24hrs), Av, Min:47, Max:85    SpO2  Av %  Min: 100 %  Max: 100 %    No intake/output data recorded.      General: In no acute distress, alert and oriented x4  Cardiovascular: Warm throughout, no edema  Respiratory: no respiratory distress, equal chest rise  Abdomen: soft,  minimally tender to lower abdomen, nondistended  Skin: no obvious rashes or lesions appreciated, no jaundice  Extremities: atraumatic, no focal motor deficits, no open wounds. Ex-fix LLE    CBC  Recent Labs     25  0626 25  1347 25  0514   WBC 5.8 6.5 6.0   RBC 2.53* 2.57* 2.61*   HGB 7.5* 7.6* 7.8*   HCT 21.9* 22.0* 23.0*   MCV 86.6 85.6 88.1   MCH 29.6 29.6 29.9   MCHC 34.2 34.5 33.9   RDW 14.2 14.2 14.5    223 238   MPV 9.6 9.5 9.5       CMP  Recent Labs     25  0458 25  0626 25  0514    134 140   K 3.6 3.7 3.7   CL 95* 97* 101   CO2 25 23 23   BUN 21* 13 24*   CREATININE 4.2* 4.1* 6.5*   GLUCOSE 172* 190* 142*   CALCIUM 9.0 8.7 9.4 
GENERAL SURGERY  DAILY PROGRESS NOTE    Patient's Name/Date of Birth: Michelle Nettles / 1985    Date: 2025     Chief Complaint   Patient presents with    Nausea    Vomiting    Diarrhea     N/V/D since Thursday. Ankle sx on Thursday. Has been sick since then. From [facility]. Non weight bearing to surgical ankle. Baseline is 5 L NC.         Subjective:  EGD yesterday with some distal mild esophagitis. Patient feeling better this morning, going for ex-fix removal today. Hgb pending.     Objective:  Last 24Hrs  Temp  Av.1 °F (36.7 °C)  Min: 96.9 °F (36.1 °C)  Max: 99 °F (37.2 °C)  Resp  Av.5  Min: 12  Max: 34  Pulse  Av.1  Min: 91  Max: 123  Systolic (24hrs), Av , Min:114 , Max:219     Diastolic (24hrs), Av, Min:56, Max:97    SpO2  Av.2 %  Min: 94 %  Max: 100 %    I/O last 3 completed shifts:  In: 960 [P.O.:360]  Out: 4293       General: In no acute distress, alert and oriented x4  Cardiovascular: Warm throughout, no edema  Respiratory: no respiratory distress, equal chest rise  Abdomen: soft,  minimally tender, nondistended  Skin: no obvious rashes or lesions appreciated, no jaundice  Extremities: atraumatic, no focal motor deficits, no open wounds. Ex-fix LLE    CBC  Recent Labs     25  0654 25  0458   WBC 7.7  --  DUPLICATE ORDER  7.7   RBC 2.54*  --  DUPLICATE ORDER  2.75*   HGB 7.5* 8.0* DUPLICATE ORDER  8.1*   HCT 21.6* 23.0* DUPLICATE ORDER  24.0*   MCV 85.0  --  DUPLICATE ORDER  87.3   MCH 29.5  --  DUPLICATE ORDER  29.5   MCHC 34.7*  --  DUPLICATE ORDER  33.8   RDW 14.4  --  DUPLICATE ORDER  14.6     --  DUPLICATE ORDER  219   MPV 9.2  --  DUPLICATE ORDER  9.4       CMP  Recent Labs     25  0836 25  1543 25  0458   NA  --  130* 132   K 3.7 3.5 3.6   CL  --  94* 95*   CO2  --  23 25   BUN  --  14 21*   CREATININE  --  2.6* 4.2*   GLUCOSE  --  117* 172*   CALCIUM  --  9.0 9.0   BILITOT  
Made aware of hgb at 6.2, pt noted to already have 3 units of prbc 1 in ED and 2 units in dialysis, will order an additional unit at this time  
Message sent to Dr Guzmán to see if pt can be med surg no tele  
Message sent to Dr. Kay regarding type and screen    
Messaged  LEAH that patient is back in room  
Notified Dr. Gaxiola that this patient's glucose is running high. STAT lab pending. Patient given 12 units of Humalog.    
Notified Dr. Guzmán of pt's blood sugar of 538  
Notified NIKKI Finnegan that patient's repeat glucose is 499 now.   
OT SESSION ATTEMPT     Date:3/7/2025  Patient Name: Michelle Nettles  MRN: [id number]  : 1985  Room: [address] [place] [address]     Occupational therapy orders received/chart review completed and OT session attempted this date:   Pt off the floor for HD      Will reattempt OT at a later time/date.  Thank you,   Jose Flores OTR/L [id number]    
Ortho consult sent to resident on call Dr Kay  
Orthopedic surgery interval update:    Secondary to general surgery status post EGD and possible colonoscopy tomorrow, will delay orthopedic surgery procedure until stable.  Okay for diet today      Electronically signed by Tej Kay DO on 3/6/2025 at 12:23 PM   
Patient admitted to PACU and placed on appropriate monitors. Patient on 3lnc. Airway patent at this time. Report obtained from ENDO RN Warm blankets applied.  
Patient had BM on bedside commode, dajuan blood while wiping and in commode. Patient states she had difficulty passing stool and was straining. Dr. Vidales with general surgery notified.  
Patient to be discharged back to facility . Nurse to nurse called.  
Patient transferred to floor on bed, telemetry monitor and 3lnc in stable condition with ancillary staff.  
Physical Therapy  Physical Therapy Initial Assessment     Name: Michelle Nettles  : 1985  MRN: [id number]      Date of Service: 3/8/2025    Evaluating PT:  Freddie Villalta PT, DPT [id number]    Room #:  8518/8518-B  Diagnosis:  Hyperkalemia [E87.5]  Nausea vomiting and diarrhea [R11.2, R19.7]  Acute on chronic anemia [D64.9]  PMHx/PSHx:    Past Medical History:   Diagnosis Date    JOLLY (acute kidney injury) 2016    Anemia     AVF (arteriovenous fistula) 2018    let arm    Cellulitis, face     CHF (congestive heart failure) ([facility])     Chronic kidney disease     Chronic respiratory failure with hypoxia ([facility])     Dialysis AV fistula malfunction, initial encounter 2019    Displaced trimalleolar fracture of left lower leg, closed, initial encounter 2025    DM type 1 (diabetes mellitus, type 1) ([facility])     Encounter regarding vascular access for dialysis for ESRD ([facility]) 2018    ESRD (end stage renal disease) ([facility])     Hemodialysis patient     amrita dawson  / graft in left arm    Hidradenitis suppurativa     Hypercalcemia     Hyperkalemia     Hypertension     Iron deficiency anemia secondary to blood loss (chronic)     Other acute gastritis without hemorrhage     Tobacco abuse 2016    Type 2 diabetes mellitus with hyperglycemia ([facility])     Vitamin B-complex deficiency      Procedure/Surgery:  3/5 EGD, 3/7 removal of L ankle ex fix, ORIF L ankle  Precautions:  Falls, NWB LLE, bed alarm, O2  Equipment Needs:  TBD    SUBJECTIVE:    Pt was admitted from [facility].  Pt completed stand pivot transfer to  using WW PRN PTA.  Pt reported being quite independent at [facility].    OBJECTIVE:   Initial Evaluation  Date: 3/8/25 Treatment Short Term/ Long Term   Goals   AM-PAC 6 Clicks      Was pt agreeable to Eval/treatment? Yes     Does pt have pain? Surgical pain but no rating given     Bed Mobility  Rolling: NT  Supine to sit: Dariela  Sit to supine: Dariela  Scooting: Dariela  Mod 
Physical Therapy  Pt chart reviewed for PT evaluation. Patient is currently unavailable for PT services, as patient is off unit at dialysis.  Will follow up as appropriate.     Stacy Steel, PT, DPT  [id number]      
Pt verified using 2 Identifiers and ID band with OR staff prior to acceptance to PACU/Phase II care.   
Renal Dose Adjustment Policy (Generic)     This patient is on medication that requires renal, weight, and/or indication dose adjustment.      Date Body Weight IBW  Adjusted BW SCr  CrCl Dialysis status   3/7/2025 72.6 kg (160 lb) Ideal body weight: 48.8 kg (107 lb 8.4 oz)  Adjusted ideal body weight: 58.3 kg (128 lb 8.2 oz) Serum creatinine: 6.5 mg/dL (H) 03/07/25 0514  Estimated creatinine clearance: 11 mL/min (A) HD       Pharmacy has dose-adjusted the following medication(s):    Date Previous Order Adjusted Order   3/7/2025 Ancef 2g Q8H x 2 doses Ancef 2 g Q24h x 1 dose       These changes were made per protocol according to the [facility]   Automatic Renal Dose Adjustment Policy.     *Please note this dose may need readjusted if patient's condition changes.    Please contact pharmacy with any questions regarding these changes.    Sharona Murry [facility]  3/7/2025  9:27 AM    
Sent message for clearance for removal of external fixation device per ortho.  
[facility]  Nephrology Progress Note    Patient's Name: Michelle Nettles    History of Present Illness from 2/15 consult note:    \"Michelle Nettles is a 39 y.o. female with a past medical history of ESRD, diabetes mellitus, and hypertension.  She presented to the [place] ED on 2/14 reportedly for concerns of syncope/fall.  Vital signs on 2/14 includes temperature 98.2, respirations 24, pulse 102, /73, and she was 99% SpO2.  Lab data on 2/14 includes potassium 3.1, CO2 31, BUN 28, creatinine 7.6, glucose 264, and hemoglobin 7.6.  She had a CT head on 2/14 which showed no evidence of an acute or subacute intracranial normality.  XR left ankle 2/14 showed acute trimalleolar fracture of the left ankle.  Chest x-ray from 2/14 showed no acute cardiopulmonary disease.  She reportedly transferred to [facility] on 2/15 and was seen by orthopedic surgery.  She underwent application of multiplanar external fixator LLE, closed treatment left ankle fracture/ dislocation 2/15.  Nephrology has been consulted to see the patient for ESRD on HD.  Patient is known to our service and dialyzes as an outpatient at [facility] via left arm AV fistula.  At present, patient was seen and examined.  She notes that she was standing up coughing and woke up on the floor.  She denies any dizziness.  She denies any chest pain.  She denies any nausea, vomiting, or diarrhea.  Her appetite has been good.\"  HPI from 3/3:  \"A full consult is deferred as patient was followed by our service while inpatient recently.  Briefly, she presented to the ED on 3/2 reportedly for concerns of nausea, vomiting, and diarrhea.  Vital signs on 3/2 includes Temperature 97.8, respirations 16, pulse 105, /75, and she was 100% SpO2.  Lab data on 3/2 includes sodium 131, potassium 6.7 (hemolyzed), BUN 82, creatinine 10.4, glucose 133, and hemoglobin 5.2.  She was COVID/influenza negative.  CT abdomen/pelvis from 
[facility]  Nephrology Progress Note    Patient's Name: Michelle Nettles    History of Present Illness from 2/15 consult note:    \"Michelle Nettles is a 39 y.o. female with a past medical history of ESRD, diabetes mellitus, and hypertension.  She presented to the [place] ED on 2/14 reportedly for concerns of syncope/fall.  Vital signs on 2/14 includes temperature 98.2, respirations 24, pulse 102, /73, and she was 99% SpO2.  Lab data on 2/14 includes potassium 3.1, CO2 31, BUN 28, creatinine 7.6, glucose 264, and hemoglobin 7.6.  She had a CT head on 2/14 which showed no evidence of an acute or subacute intracranial normality.  XR left ankle 2/14 showed acute trimalleolar fracture of the left ankle.  Chest x-ray from 2/14 showed no acute cardiopulmonary disease.  She reportedly transferred to [facility] on 2/15 and was seen by orthopedic surgery.  She underwent application of multiplanar external fixator LLE, closed treatment left ankle fracture/ dislocation 2/15.  Nephrology has been consulted to see the patient for ESRD on HD.  Patient is known to our service and dialyzes as an outpatient at [facility] via left arm AV fistula.  At present, patient was seen and examined.  She notes that she was standing up coughing and woke up on the floor.  She denies any dizziness.  She denies any chest pain.  She denies any nausea, vomiting, or diarrhea.  Her appetite has been good.\"  HPI from 3/3:  \"A full consult is deferred as patient was followed by our service while inpatient recently.  Briefly, she presented to the ED on 3/2 reportedly for concerns of nausea, vomiting, and diarrhea.  Vital signs on 3/2 includes Temperature 97.8, respirations 16, pulse 105, /75, and she was 100% SpO2.  Lab data on 3/2 includes sodium 131, potassium 6.7 (hemolyzed), BUN 82, creatinine 10.4, glucose 133, and hemoglobin 5.2.  She was COVID/influenza negative.  CT abdomen/pelvis from 
[facility]  Nephrology Progress Note    Patient's Name: Michelle Nettles    History of Present Illness from 2/15 consult note:    \"Michelle Nettles is a 39 y.o. female with a past medical history of ESRD, diabetes mellitus, and hypertension.  She presented to the [place] ED on 2/14 reportedly for concerns of syncope/fall.  Vital signs on 2/14 includes temperature 98.2, respirations 24, pulse 102, /73, and she was 99% SpO2.  Lab data on 2/14 includes potassium 3.1, CO2 31, BUN 28, creatinine 7.6, glucose 264, and hemoglobin 7.6.  She had a CT head on 2/14 which showed no evidence of an acute or subacute intracranial normality.  XR left ankle 2/14 showed acute trimalleolar fracture of the left ankle.  Chest x-ray from 2/14 showed no acute cardiopulmonary disease.  She reportedly transferred to [facility] on 2/15 and was seen by orthopedic surgery.  She underwent application of multiplanar external fixator LLE, closed treatment left ankle fracture/ dislocation 2/15.  Nephrology has been consulted to see the patient for ESRD on HD.  Patient is known to our service and dialyzes as an outpatient at [facility] via left arm AV fistula.  At present, patient was seen and examined.  She notes that she was standing up coughing and woke up on the floor.  She denies any dizziness.  She denies any chest pain.  She denies any nausea, vomiting, or diarrhea.  Her appetite has been good.\"  HPI from 3/3:  \"A full consult is deferred as patient was followed by our service while inpatient recently.  Briefly, she presented to the ED on 3/2 reportedly for concerns of nausea, vomiting, and diarrhea.  Vital signs on 3/2 includes Temperature 97.8, respirations 16, pulse 105, /75, and she was 100% SpO2.  Lab data on 3/2 includes sodium 131, potassium 6.7 (hemolyzed), BUN 82, creatinine 10.4, glucose 133, and hemoglobin 5.2.  She was COVID/influenza negative.  CT abdomen/pelvis from 
[facility]  Nephrology Progress Note    Patient's Name: Michelle Nettles    History of Present Illness from 2/15 consult note:    \"Michelle Nettles is a 39 y.o. female with a past medical history of ESRD, diabetes mellitus, and hypertension.  She presented to the [place] ED on 2/14 reportedly for concerns of syncope/fall.  Vital signs on 2/14 includes temperature 98.2, respirations 24, pulse 102, /73, and she was 99% SpO2.  Lab data on 2/14 includes potassium 3.1, CO2 31, BUN 28, creatinine 7.6, glucose 264, and hemoglobin 7.6.  She had a CT head on 2/14 which showed no evidence of an acute or subacute intracranial normality.  XR left ankle 2/14 showed acute trimalleolar fracture of the left ankle.  Chest x-ray from 2/14 showed no acute cardiopulmonary disease.  She reportedly transferred to [facility] on 2/15 and was seen by orthopedic surgery.  She underwent application of multiplanar external fixator LLE, closed treatment left ankle fracture/ dislocation 2/15.  Nephrology has been consulted to see the patient for ESRD on HD.  Patient is known to our service and dialyzes as an outpatient at [facility] via left arm AV fistula.  At present, patient was seen and examined.  She notes that she was standing up coughing and woke up on the floor.  She denies any dizziness.  She denies any chest pain.  She denies any nausea, vomiting, or diarrhea.  Her appetite has been good.\"  HPI from 3/3:  \"A full consult is deferred as patient was followed by our service while inpatient recently.  Briefly, she presented to the ED on 3/2 reportedly for concerns of nausea, vomiting, and diarrhea.  Vital signs on 3/2 includes Temperature 97.8, respirations 16, pulse 105, /75, and she was 100% SpO2.  Lab data on 3/2 includes sodium 131, potassium 6.7 (hemolyzed), BUN 82, creatinine 10.4, glucose 133, and hemoglobin 5.2.  She was COVID/influenza negative.  CT abdomen/pelvis from 
Weaned to 5L NC at 98% Spo2. Patient wears 4-5L NC at home.   
Xray at the bedside  
PGY-4    Electronically signed on 3/5/2025 at 6:18 AM    
PRN  ondansetron, 4 mg, Q8H PRN   Or  ondansetron, 4 mg, Q6H PRN  polyethylene glycol, 17 g, Daily PRN  acetaminophen, 650 mg, Q6H PRN   Or  acetaminophen, 650 mg, Q6H PRN  albuterol, 5 mg, Q4H PRN  bisacodyl, 10 mg, Daily PRN  diphenhydrAMINE, 25 mg, Q6H PRN  glucose, 4 tablet, PRN  dextrose bolus, 125 mL, PRN   Or  dextrose bolus, 250 mL, PRN  glucagon (rDNA), 1 mg, PRN  dextrose, , Continuous PRN         Objective:    BP (!) 161/73   Pulse 87   Temp 97.4 °F (36.3 °C) (Temporal)   Resp 12   Ht 1.499 m (4' 11\")   Wt 72.6 kg (160 lb)   SpO2 97%   BMI 32.32 kg/m²     General Appearance: alert and oriented to person, place and time and in no acute distress  Skin: warm and dry  Head: normocephalic and atraumatic  Eyes: pupils equal, round, and reactive to light, extraocular eye movements intact, conjunctivae normal  Neck: neck supple and non tender without mass   Pulmonary/Chest: clear to auscultation bilaterally- no wheezes, rales or rhonchi, normal air movement, no respiratory distress  Cardiovascular: normal rate, normal S1 and S2 and no carotid bruits  Abdomen: soft, mild diffuse tenderness, non-distended, normal bowel sounds, no masses or organomegaly  Extremities: No cyanosis, no edema  Neurologic: no cranial nerve deficit and speech normal        Recent Labs     03/05/25  0458 03/06/25  0626 03/07/25  0514    134 140   K 3.6 3.7 3.7   CL 95* 97* 101   CO2 25 23 23   BUN 21* 13 24*   CREATININE 4.2* 4.1* 6.5*   GLUCOSE 172* 190* 142*   CALCIUM 9.0 8.7 9.4       Recent Labs     03/06/25  0626 03/06/25  1347 03/07/25  0514   WBC 5.8 6.5 6.0   RBC 2.53* 2.57* 2.61*   HGB 7.5* 7.6* 7.8*   HCT 21.9* 22.0* 23.0*   MCV 86.6 85.6 88.1   MCH 29.6 29.6 29.9   MCHC 34.2 34.5 33.9   RDW 14.2 14.2 14.5    223 238   MPV 9.6 9.5 9.5         Assessment:    Acute on chronic anemia, rule out GI bleed  Nausea/vomiting  ESRD on HD  Recent left ankle fracture surgery with external fixator in place  Chronic hypoxic 
good             Judgement/safety: good                Communication skills: WFL             Vision: WFL                    Glasses:no                                                       Hearing: WFL     UE Assessment:  Hand Dominance: Right []  Left []       ROM Strength   RUE  Grossly 90 (reports h/o injury and shoulder weakness)  WFL distal 4-/5   LUE WFL 4/5      Sensation: c/o numbness L toes  Tone: WNL   Edema: WFL     Functional Assessment:  AM-PAC Daily Activity Raw Score: 18/24    Initial Eval Status  Date: 3/8/25 Treatment Status  Date: 3/11/25 STGs = LTGs  Time frame: 7-14 days   Feeding IND bed level  Independent                       IND  while seated up in chair to increase activity tolerance         Grooming S; set up  Setup  Pt washed face, applied deodorant seated EOB                       Gregory   while standing sink level requiring AD for balance and demonstrating good tolerance; G follow through of NWB restrictions      UB dressing/bathing S; set up  Setup-dressing  Daniel/[facility] gown    Setup-bathing  Simulated Task                       IND         LB dressing/bathing Mod A  Long sitting bed level  Edmund-dressing  Daniel sock to R foot long sitting in bed    Edmund-bathing  Simulated Task  Assistance to maintain standing balance to wash of buttocks                       Gregory   using AE as needed for safe reach/ energy conservation        Toileting NT  (Declines use of BSC)  Edmund  Pt compelted toileting task on BSC, assistance to complete transfer and and maintain standing balance for safety, pt able to complete of hygiene and manage of gown                       Gregory      Bed Mobility  Supine to sit:   Min A     Sit to supine:   Min A  SBA  Supine<>EOB  EOB<>Supine                       Gregory  in prep of ADL tasks & transfers   Functional Transfers Sit to stand:   Min A WW     Stand to sit:   Min A  Edmund  Sit to Stand  Stand to Sit    Stand Pivot Transfer   EOB<>BSC  BSC<>EOB   with use of w.w.  
on.    Treatment:  Patient practiced and was instructed in the following treatment:    Bed mobility training - pt given verbal and tactile cues to facilitate proper sequencing and safety during supine>sit as well as provided with physical assistance.  Sitting EOB for >5 minutes for upright tolerance, postural awareness and BLE ROM  Transfer training - pt was given verbal and tactile cues to facilitate proper hand placement, technique and safety during sit to stand, stand to sit transfers as well as provided with physical assistance.   Gait training- pt was given verbal and tactile cues to facilitate safety and balance during ambulation as well as provided with physical assistance.    Pt's/ family goals   1. Return to rehab    Prognosis is good for reaching above PT goals.    Patient and or family understand(s) diagnosis, prognosis, and plan of care:   [x] Yes [] No      PHYSICAL THERAPY PLAN OF CARE:    PT POC is established based on physician order and patient diagnosis     Referring provider/PT Order:  Crispin Felipe DO  /03/07/25 0930  PT eval and treat  Diagnosis:  Hyperkalemia [E87.5]  Nausea vomiting and diarrhea [R11.2, R19.7]  Acute on chronic anemia [D64.9]  Specific instructions for next treatment:  Progress activity     Current Treatment Recommendations:     [x] Strengthening to improve independence with functional mobility   [x] ROM to improve independence with functional mobility   [x] Balance Training to improve static/dynamic balance and to reduce fall risk  [x] Endurance Training to improve activity tolerance during functional mobility   [x] Transfer Training to improve safety and independence with all functional transfers   [x] Gait Training to improve gait mechanics, endurance and asses need for appropriate assistive device  [x] Stair Training in preparation for safe discharge home and/or into the community   [x] Positioning to prevent skin breakdown and contractures  [x] Safety and Education 
respiratory distress  Cardiovascular: normal rate, normal S1 and S2 and no carotid bruits  Abdomen: soft, mild diffuse tenderness, non-distended, normal bowel sounds, no masses or organomegaly  Extremities: LLE external fixator in place  Neurologic: no cranial nerve deficit and speech normal        Recent Labs     03/03/25  0530 03/03/25  0836 03/04/25  1543 03/05/25  0458   *  --  130* 132   K 7.7* 3.7 3.5 3.6   CL 86*  --  94* 95*   CO2 22  --  23 25   BUN 97*  --  14 21*   CREATININE 11.2*  --  2.6* 4.2*   GLUCOSE 466*  --  117* 172*   CALCIUM 9.1  --  9.0 9.0       Recent Labs     03/03/25  0529 03/03/25  0920 03/04/25  0654 03/04/25 2024 03/05/25  0458   WBC 7.9  --  7.7  --  DUPLICATE ORDER  7.7   RBC 1.78*  --  2.54*  --  DUPLICATE ORDER  2.75*   HGB 5.2*   < > 7.5* 8.0* DUPLICATE ORDER  8.1*   HCT 15.5*   < > 21.6* 23.0* DUPLICATE ORDER  24.0*   MCV 87.1  --  85.0  --  DUPLICATE ORDER  87.3   MCH 29.2  --  29.5  --  DUPLICATE ORDER  29.5   MCHC 33.5  --  34.7*  --  DUPLICATE ORDER  33.8   RDW 15.0  --  14.4  --  DUPLICATE ORDER  14.6     --  195  --  DUPLICATE ORDER  219   MPV 10.3  --  9.2  --  DUPLICATE ORDER  9.4    < > = values in this interval not displayed.         Assessment:    Acute on chronic anemia, rule out GI bleed  Nausea/vomiting  ESRD on HD  Recent left ankle fracture surgery with external fixator in place  Chronic hypoxic respiratory failure  Hypotension and tachycardia likely 2/2 HD  Hyponatremia and hyperkalemia likely 2/2 ESRD      Plan:  General Surgery following for concern of GI bleed:  S/p 4 unit PRBC total since admission  EGD normal on 3/5  Continue PPI twice daily and Carafate  CBC daily  Orthopedic surgery consulted as patient has scheduled external fixator removal tomorrow  Hold aspirin, Plavix, DVT prophylaxis for now  Please continue the following insulin regimen:  Lantus 8 units nightly  Humalog 4 units premeals  Medium dose sliding scale  Nephrology 
Plavix daily  Please continue the following insulin regimen:  Continue Lantus to 10 units nightly  Humalog 4 units premeals  Medium dose sliding scale  Nephrology following for HD needs, PhosLo remains held  Change pain regimen to:  Norco every 6 hours as needed  Wean Dilaudid to 0.25 mg IV every 4 hours as needed  Stop Roxicodone  Continue other medications including Coreg, PhosLo, PPI twice daily, Carafate  Midodrine remains on given adequate BP control      DC planning: Brenda of the [facility] pending improvement in pain control and pre-CERT      NOTE: This report was transcribed using voice recognition software. Every effort was made to ensure accuracy; however, inadvertent computerized transcription errors may be present.    Electronically signed by Elias Guzmán MD on 3/10/2025 at 12:31 PM      
following for HD needs  Continue Roxicodone as needed for pain and Dilaudid IV for breakthrough pain  Continue other medications including Lantus 8 units nightly, Humalog 4 units premeals, Coreg, PhosLo, PPI twice daily, Carafate      DC planning: Brenda of the [facility], likely start pre-CERT on 3/10      NOTE: This report was transcribed using voice recognition software. Every effort was made to ensure accuracy; however, inadvertent computerized transcription errors may be present.    Electronically signed by Elias Guzmán MD on 3/9/2025 at 11:38 AM      
HYDROmorphone, dextrose, benzocaine-menthol, benzonatate, calcium carbonate, hydrALAZINE, melatonin, Polyvinyl Alcohol-Povidone PF, sodium chloride, sodium chloride flush, sodium chloride, ondansetron **OR** ondansetron, polyethylene glycol, acetaminophen **OR** acetaminophen, albuterol, bisacodyl, diphenhydrAMINE, glucose, dextrose bolus **OR** dextrose bolus, glucagon (rDNA), dextrose    Meds prior to admission:     No current facility-administered medications on file prior to encounter.     Current Outpatient Medications on File Prior to Encounter   Medication Sig Dispense Refill    insulin lispro (HUMALOG,ADMELOG) 100 UNIT/ML SOLN injection vial Inject 4 Units into the skin 3 times daily (with meals)      magnesium hydroxide (MILK OF MAGNESIA) 400 MG/5ML suspension Take 30 mLs by mouth daily as needed for Constipation      tuberculin 5 UNIT/0.1ML injection Inject 0.1 mLs into the skin      diphenhydrAMINE (BENADRYL) 25 MG tablet Take 1 tablet by mouth every 6 hours as needed for Itching      bisacodyl (DULCOLAX) 10 MG suppository Place 1 suppository rectally daily as needed      aspirin 81 MG EC tablet Take 1 tablet by mouth 2 times daily 60 tablet 2    insulin lispro, 1 Unit Dial, (HUMALOG KWIKPEN) 100 UNIT/ML SOPN Inject 3 Units into the skin 3 times daily (before meals) *Plus Sliding Scale* max daily dose 50u (Patient taking differently: Inject into the skin 3 times daily (before meals) Follow sliding scale)      insulin glargine (LANTUS SOLOSTAR) 100 UNIT/ML injection pen Inject 10 Units into the skin nightly (Patient taking differently: Inject 8 Units into the skin nightly)      enoxaparin Sodium (LOVENOX) 30 MG/0.3ML injection Inject 0.3 mLs into the skin daily 9 mL 0    ferrous sulfate (IRON 325) 325 (65 Fe) MG tablet Take 1 tablet by mouth every other day 45 tablet 1    pantoprazole (PROTONIX) 40 MG tablet Take 1 tablet by mouth 2 times daily (before meals) 30 tablet 2    Blood Pressure Monitoring (BLOOD 
pantoprazole (PROTONIX) 40 MG tablet Take 1 tablet by mouth 2 times daily (before meals) 30 tablet 2    Blood Pressure Monitoring (BLOOD PRESSURE KIT) LIANNE Check BP daily 1 each 0    Misc. Devices (PULSE OXIMETER FOR FINGER) MISC Check HR and pulse ox daily and PRN symptoms 1 each 0    vitamin B-12 (CYANOCOBALAMIN) 500 MCG tablet Take 1 tablet by mouth daily 90 tablet 1    clopidogrel (PLAVIX) 75 MG tablet Take 1 tablet by mouth daily 90 tablet 1    Accu-Chek Softclix Lancets MISC 1 each by Does not apply route 4 times daily 200 each 5    Glucagon, rDNA, (GLUCAGON EMERGENCY) 1 MG KIT Inject 1 mg as directed as needed (for blood glucose level <50) 1 kit 5    docusate sodium (COLACE) 100 MG capsule Take 1 capsule by mouth daily 90 capsule 0    fluticasone (FLONASE) 50 MCG/ACT nasal spray 2 sprays by Each Nostril route daily 16 g 3    calcium acetate (PHOSLO) 667 MG CAPS capsule Take 3 capsules by mouth 3 times daily (with meals) (Patient taking differently: Take 1 capsule by mouth 3 times daily (with meals)) 270 capsule 0    sucralfate (CARAFATE) 1 GM tablet Take 1 tablet by mouth 4 times daily (before meals and nightly) 120 tablet 3    Continuous Glucose Transmitter (DEXCOM G6 TRANSMITTER) MISC Use with sensors 1 each 0    carvedilol (COREG) 25 MG tablet TAKE 1 TABLET BY MOUTH DAILY WITH SUPPER (Patient taking differently: Take 1 tablet by mouth every morning) 90 tablet 0    Blood Glucose Monitoring Suppl (ACCU-CHEK GUIDE) w/Device KIT Use to test blood sugars 1 kit 0    blood glucose test strips (ACCU-CHEK GUIDE) strip Use to test blood sugar up to 4 times daily as needed. 300 each 3    Insulin Pen Needle 32G X 4 MM MISC 1 each by Does not apply route 4 times daily 400 each 3    Alcohol Swabs (ALCOHOL PREP) PADS Use as needed 1 each 3    sodium chloride (OCEAN, BABY AYR) 0.65 % nasal spray 1 spray by Nasal route every 2 hours as needed for Congestion 60 mL 0    Multiple Vitamin (DAILY JACOB) TABS Take 800 mg by mouth 
acute distress  Neck: No JVD noted  Lungs: Clear bilaterally upper, diminished to the bases bilaterally.  Unlabored  CV: Tachycardic.  No rub  Abd: Soft, nontender, nondistended.  Active bowel sounds  Skin: Warm and dry.  No rash on exposed extremities  Ext: trace RLE edema; left leg wrap in place  Neuro: Awake, answers questions appropriately    Data:    Recent Labs     03/07/25  0514 03/08/25  0735 03/09/25  0434   WBC 6.0 6.4 8.0   HGB 7.8* 7.9* 7.4*   HCT 23.0* 23.2* 21.8*   MCV 88.1 86.9 88.3    277 269       Recent Labs     03/07/25  0514 03/08/25  0735 03/08/25  1722 03/09/25  0434    135 132 139   K 3.7 3.4* 4.1 3.8    95* 92* 99   CO2 23 24 25 23   CREATININE 6.5* 4.5* 5.3* 6.3*   BUN 24* 19 22* 26*   LABGLOM 8* 12* 10* 8*   GLUCOSE 142* 209* 538* 64*   CALCIUM 9.4 9.5 9.4 9.2   PHOS 2.8 1.5*  --  2.5   MG 2.0 2.0  --  1.9       Vit D, 25-Hydroxy   Date Value Ref Range Status   02/15/2025 26.2 (L) 30.0 - 100.0 ng/mL Final       No results found for: \"PTH\"    Recent Labs     03/07/25  0514 03/08/25  0735 03/09/25  0434   ALT 7 6 <5   AST 18 15 16   ALKPHOS 95 100 96   BILITOT 0.3 0.2 0.2       No results for input(s): \"LABALBU\" in the last 72 hours.    Ferritin   Date Value Ref Range Status   03/02/2025 360 ng/mL Final     Comment:           FERRITIN Reference Ranges:  Adult Males   20 - 60 years:    30 - 400 ng/mL  Adult females 17 - 60 years:    13 - 150 ng/mL  Adults greater than 60 years:   no established reference range  Pediatrics:  no established reference range       Iron   Date Value Ref Range Status   03/02/2025 28 (L) 37 - 145 ug/dL Final     TIBC   Date Value Ref Range Status   03/02/2025 197 (L) 250 - 450 ug/dL Final       Vitamin B-12   Date Value Ref Range Status   03/02/2025 >2000 (H) 211 - 946 pg/mL Final       Folate   Date Value Ref Range Status   03/02/2025 >20.0 4.8 - 24.2 ng/mL Final       Lab Results   Component Value Date/Time    COLORU Yellow 11/23/2017 10:05 PM    
Negative 11/23/2017 10:05 PM    UROBILINOGEN 0.2 11/23/2017 10:05 PM    BILIRUBINUR Negative 11/23/2017 10:05 PM    BILIRUBINUR NEGATIVE 11/21/2010 10:30 PM       Lab Results   Component Value Date/Time    PROTEIN 6.3 (L) 03/10/2025 06:30 AM       No components found for: \"URIC\"    No results found for: \"LIPIDPAN\"    Assessment and Plans:    ESRD on HD  Outpatient [facility] via left arm AV fistula  Monitor labs  Continue HD while inpatient-HD today    2.  Ankle fracture  XR L ankle 2/14-acute trimalleolar fracture of L ankle  S/p application of multiplanar external fixator LLE, closed treatment L ankle fracture/ dislocation 2/15  S/p removal of external fixator LLE, ORIF L ankle fracture 3/7  Defer to orthopedic surgery    3.  Hyperkalemia  With ESRD  K+ 7.7 on 3/3--> 4.4 today  Monitor labs    4.  Hypertension with CKD/ESRD  BP goal<130/80  BP above goal  Monitor BPs    5.  Anemia with CKD  Concern for GI bleed  Hemoglobin target 10-12  Hemoglobin 5.2 on 3/2--> 7.4 today  S/p EGD 1/13-acute gastritis-biopsied, Schatzki ring   S/p EGD 3/5-esophagitis  Transfuse for hemoglobin<7-as per primary service  S/p PRBC   Monitor H&H  General Surgery consulted    6.  Secondary hyperparathyroidism of renal origin  /phosphorus 2.3 on 2/7 in the outpatient setting  Phosphorus 3.1 today  calcium acetate on hold  Monitor labs    KEMI Lewis - CNP  Pt seen and examined at hd  D/w hd nurse Walker  Pt tolerating hd  She is tired today  Hd mwf  Severo Car MD

## 2025-03-11 NOTE — DISCHARGE SUMMARY
Nostril route daily     Glucagon Emergency 1 MG Kit  Inject 1 mg as directed as needed (for blood glucose level <50)     * insulin lispro 100 UNIT/ML Soln injection vial  Commonly known as: HUMALOG,ADMELOG     * insulin lispro (1 Unit Dial) 100 UNIT/ML Sopn  Commonly known as: HumaLOG KwikPen  Inject 3 Units into the skin 3 times daily (before meals) *Plus Sliding Scale* max daily dose 50u     Insulin Pen Needle 32G X 4 MM Misc  1 each by Does not apply route 4 times daily     Lantus SoloStar 100 UNIT/ML injection pen  Generic drug: insulin glargine  Inject 10 Units into the skin nightly     magnesium hydroxide 400 MG/5ML suspension  Commonly known as: MILK OF MAGNESIA     OXYGEN     pantoprazole 40 MG tablet  Commonly known as: PROTONIX  Take 1 tablet by mouth 2 times daily (before meals)     Pulse Oximeter For Finger Misc  Check HR and pulse ox daily and PRN symptoms     sodium chloride 0.65 % nasal spray  Commonly known as: OCEAN, BABY AYR  1 spray by Nasal route every 2 hours as needed for Congestion     sucralfate 1 GM tablet  Commonly known as: CARAFATE  Take 1 tablet by mouth 4 times daily (before meals and nightly)     tuberculin 5 UNIT/0.1ML injection     vitamin B-12 500 MCG tablet  Commonly known as: CYANOCOBALAMIN  Take 1 tablet by mouth daily           * This list has 2 medication(s) that are the same as other medications prescribed for you. Read the directions carefully, and ask your doctor or other care provider to review them with you.                STOP taking these medications      acetaminophen 325 MG tablet  Commonly known as: TYLENOL     acetaminophen 500 MG tablet  Commonly known as: TYLENOL               Where to Get Your Medications        You can get these medications from any pharmacy    Bring a paper prescription for each of these medications  HYDROcodone-acetaminophen 5-325 MG per tablet  lidocaine 4 % external patch  tiZANidine 4 MG tablet         Time Spent on discharge is more than 45

## 2025-03-11 NOTE — DISCHARGE INSTR - COC
Infection Onset Added Last Indicated Last Indicated By Resolved Resolved By    MRSA 24 Culture, Wound 25 Joanna Johnson, JENNIFER    MRSA skin abscess 2024  MRSA nares 2024    Parainfluenza 23 Respiratory Panel, Molecular, with COVID-19 (Restricted: peds pts or suitable admitted adults) 23 Infection     COVID-19 23 COVID-19, Rapid 23 Infection     MRSA 23 Culture, MRSA, Screening 23 Veronica Montalvo, RN    Nares 23                         Nurse Assessment:  Last Vital Signs: BP (!) 146/63   Pulse 94   Temp 98.4 °F (36.9 °C) (Temporal)   Resp 16   Ht 1.499 m (4' 11\")   Wt 71 kg (156 lb 8.4 oz)   SpO2 100%   BMI 31.61 kg/m²     Last documented pain score (0-10 scale): Pain Level: 0  Last Weight:   Wt Readings from Last 1 Encounters:   03/10/25 71 kg (156 lb 8.4 oz)     Mental Status:  oriented and alert    IV Access:  - None    Nursing Mobility/ADLs:  Walking   Assisted  Transfer  Assisted  Bathing  Assisted  Dressing  Assisted  Toileting  Assisted  Feeding  Independent  Med Admin  Independent  Med Delivery   whole    Wound Care Documentation and Therapy:  Incision 02/15/25 Ankle Left (Active)   Dressing Status Intact;Dry 25   Dressing/Treatment ABD pad;Ace wrap 25 0731   Incision Assessment Other (Comment) 03/10/25 2200   Drainage Amount None (dry) 25   Dolores-incision Assessment Other (Comment) 25   Number of days: 24       Incision Ankle Left (Active)   Dressing Status Intact;Clean;Dry 25   Incision Cleansed Cleansed with saline 25 0938   Dressing/Treatment Ace wrap;Cast 25   Closure Sutures 03/10/25 2200   Incision Assessment Other (Comment) 03/10/25 2200   Drainage Amount None (dry) 25   Number of days:         Elimination:  Continence:   Bowel: Yes  Bladder: No  Urinary Catheter: None

## 2025-03-12 ENCOUNTER — CARE COORDINATION (OUTPATIENT)
Dept: CARE COORDINATION | Age: 40
End: 2025-03-12

## 2025-03-12 NOTE — CARE COORDINATION
Per chart review, patient was discharged to [facility].  Stay at facility is expected to be 8 days as her benefits will run out.  Remote patient monitoring (RPM) equipment to remain in patient's home awaiting activation at discharge.  If the stay exceeds the anticipated period of time, discontinue order will be sent to RPM team.

## 2025-03-13 ENCOUNTER — APPOINTMENT (OUTPATIENT)
Dept: GENERAL RADIOLOGY | Age: 40
DRG: 811 | End: 2025-03-13
Payer: MEDICARE

## 2025-03-13 ENCOUNTER — HOSPITAL ENCOUNTER (INPATIENT)
Age: 40
LOS: 9 days | Discharge: NURSING, REHAB OR LONG-TERM CARE | DRG: 811 | End: 2025-03-25
Attending: EMERGENCY MEDICINE | Admitting: HOSPITALIST
Payer: MEDICARE

## 2025-03-13 DIAGNOSIS — D64.9 ACUTE ON CHRONIC ANEMIA: Primary | ICD-10-CM

## 2025-03-13 DIAGNOSIS — S82.852S CLOSED TRIMALLEOLAR FRACTURE OF LEFT ANKLE, SEQUELA: ICD-10-CM

## 2025-03-13 DIAGNOSIS — Z99.2 ESRD ON DIALYSIS (HCC): ICD-10-CM

## 2025-03-13 DIAGNOSIS — K27.9 PEPTIC ULCER DISEASE: ICD-10-CM

## 2025-03-13 DIAGNOSIS — R94.31 ABNORMAL ELECTROCARDIOGRAPHY: ICD-10-CM

## 2025-03-13 DIAGNOSIS — R06.02 SHORTNESS OF BREATH: ICD-10-CM

## 2025-03-13 DIAGNOSIS — N18.6 ESRD ON DIALYSIS (HCC): ICD-10-CM

## 2025-03-13 DIAGNOSIS — R73.9 HYPERGLYCEMIA: ICD-10-CM

## 2025-03-13 DIAGNOSIS — R07.9 CHEST PAIN, UNSPECIFIED TYPE: ICD-10-CM

## 2025-03-13 DIAGNOSIS — I50.22 CHRONIC SYSTOLIC (CONGESTIVE) HEART FAILURE (HCC): ICD-10-CM

## 2025-03-13 DIAGNOSIS — S82.852A TRIMALLEOLAR FRACTURE OF ANKLE, CLOSED, LEFT, INITIAL ENCOUNTER: ICD-10-CM

## 2025-03-13 LAB
ALBUMIN SERPL-MCNC: 3.2 G/DL (ref 3.5–5.2)
ALP SERPL-CCNC: 102 U/L (ref 35–104)
ALT SERPL-CCNC: <5 U/L (ref 0–32)
ANION GAP SERPL CALCULATED.3IONS-SCNC: 16 MMOL/L (ref 7–16)
AST SERPL-CCNC: 12 U/L (ref 0–31)
B-OH-BUTYR SERPL-MCNC: 2.87 MMOL/L (ref 0.02–0.27)
BASOPHILS # BLD: 0.02 K/UL (ref 0–0.2)
BASOPHILS NFR BLD: 0 % (ref 0–2)
BILIRUB SERPL-MCNC: 0.3 MG/DL (ref 0–1.2)
BNP SERPL-MCNC: 8415 PG/ML (ref 0–125)
BUN SERPL-MCNC: 61 MG/DL (ref 6–20)
CALCIUM SERPL-MCNC: 9.8 MG/DL (ref 8.6–10.2)
CHLORIDE SERPL-SCNC: 90 MMOL/L (ref 98–107)
CO2 SERPL-SCNC: 24 MMOL/L (ref 22–29)
CREAT SERPL-MCNC: 8.9 MG/DL (ref 0.5–1)
EOSINOPHIL # BLD: 0.34 K/UL (ref 0.05–0.5)
EOSINOPHILS RELATIVE PERCENT: 6 % (ref 0–6)
ERYTHROCYTE [DISTWIDTH] IN BLOOD BY AUTOMATED COUNT: 13.8 % (ref 11.5–15)
GFR, ESTIMATED: 5 ML/MIN/1.73M2
GLUCOSE SERPL-MCNC: 319 MG/DL (ref 74–99)
HCT VFR BLD AUTO: 17.2 % (ref 34–48)
HGB BLD-MCNC: 5.8 G/DL (ref 11.5–15.5)
IMM GRANULOCYTES # BLD AUTO: <0.03 K/UL (ref 0–0.58)
IMM GRANULOCYTES NFR BLD: 0 % (ref 0–5)
LACTATE BLDV-SCNC: 0.8 MMOL/L (ref 0.5–2.2)
LIPASE SERPL-CCNC: 9 U/L (ref 13–60)
LYMPHOCYTES NFR BLD: 1.01 K/UL (ref 1.5–4)
LYMPHOCYTES RELATIVE PERCENT: 19 % (ref 20–42)
MAGNESIUM SERPL-MCNC: 2.2 MG/DL (ref 1.6–2.6)
MCH RBC QN AUTO: 30.1 PG (ref 26–35)
MCHC RBC AUTO-ENTMCNC: 33.7 G/DL (ref 32–34.5)
MCV RBC AUTO: 89.1 FL (ref 80–99.9)
MONOCYTES NFR BLD: 0.52 K/UL (ref 0.1–0.95)
MONOCYTES NFR BLD: 10 % (ref 2–12)
NEUTROPHILS NFR BLD: 64 % (ref 43–80)
NEUTS SEG NFR BLD: 3.41 K/UL (ref 1.8–7.3)
PH VENOUS: 7.39 (ref 7.35–7.45)
PLATELET # BLD AUTO: 255 K/UL (ref 130–450)
PMV BLD AUTO: 9.2 FL (ref 7–12)
POTASSIUM SERPL-SCNC: 5 MMOL/L (ref 3.5–5)
PROT SERPL-MCNC: 7.1 G/DL (ref 6.4–8.3)
RBC # BLD AUTO: 1.93 M/UL (ref 3.5–5.5)
RBC # BLD: ABNORMAL 10*6/UL
SODIUM SERPL-SCNC: 130 MMOL/L (ref 132–146)
WBC OTHER # BLD: 5.3 K/UL (ref 4.5–11.5)

## 2025-03-13 PROCEDURE — 96375 TX/PRO/DX INJ NEW DRUG ADDON: CPT

## 2025-03-13 PROCEDURE — 71046 X-RAY EXAM CHEST 2 VIEWS: CPT

## 2025-03-13 PROCEDURE — 80053 COMPREHEN METABOLIC PANEL: CPT

## 2025-03-13 PROCEDURE — 86900 BLOOD TYPING SEROLOGIC ABO: CPT

## 2025-03-13 PROCEDURE — 86923 COMPATIBILITY TEST ELECTRIC: CPT

## 2025-03-13 PROCEDURE — 71045 X-RAY EXAM CHEST 1 VIEW: CPT

## 2025-03-13 PROCEDURE — 83880 ASSAY OF NATRIURETIC PEPTIDE: CPT

## 2025-03-13 PROCEDURE — 93005 ELECTROCARDIOGRAM TRACING: CPT

## 2025-03-13 PROCEDURE — 86901 BLOOD TYPING SEROLOGIC RH(D): CPT

## 2025-03-13 PROCEDURE — 6360000002 HC RX W HCPCS

## 2025-03-13 PROCEDURE — 82010 KETONE BODYS QUAN: CPT

## 2025-03-13 PROCEDURE — 99285 EMERGENCY DEPT VISIT HI MDM: CPT

## 2025-03-13 PROCEDURE — 2580000003 HC RX 258

## 2025-03-13 PROCEDURE — 82800 BLOOD PH: CPT

## 2025-03-13 PROCEDURE — 83735 ASSAY OF MAGNESIUM: CPT

## 2025-03-13 PROCEDURE — 6370000000 HC RX 637 (ALT 250 FOR IP)

## 2025-03-13 PROCEDURE — 83690 ASSAY OF LIPASE: CPT

## 2025-03-13 PROCEDURE — P9016 RBC LEUKOCYTES REDUCED: HCPCS

## 2025-03-13 PROCEDURE — 83605 ASSAY OF LACTIC ACID: CPT

## 2025-03-13 PROCEDURE — 85025 COMPLETE CBC W/AUTO DIFF WBC: CPT

## 2025-03-13 PROCEDURE — 96374 THER/PROPH/DIAG INJ IV PUSH: CPT

## 2025-03-13 PROCEDURE — 86850 RBC ANTIBODY SCREEN: CPT

## 2025-03-13 RX ORDER — SODIUM CHLORIDE 9 MG/ML
INJECTION, SOLUTION INTRAVENOUS PRN
Status: DISCONTINUED | OUTPATIENT
Start: 2025-03-13 | End: 2025-03-25 | Stop reason: HOSPADM

## 2025-03-13 RX ORDER — ONDANSETRON 2 MG/ML
4 INJECTION INTRAMUSCULAR; INTRAVENOUS ONCE
Status: COMPLETED | OUTPATIENT
Start: 2025-03-13 | End: 2025-03-13

## 2025-03-13 RX ADMIN — ONDANSETRON 4 MG: 2 INJECTION, SOLUTION INTRAMUSCULAR; INTRAVENOUS at 21:19

## 2025-03-13 RX ADMIN — SODIUM CHLORIDE 40 MG: 9 INJECTION, SOLUTION INTRAMUSCULAR; INTRAVENOUS; SUBCUTANEOUS at 21:19

## 2025-03-13 RX ADMIN — HUMAN INSULIN 6 UNITS: 100 INJECTION, SOLUTION SUBCUTANEOUS at 22:29

## 2025-03-13 RX ADMIN — HYDROMORPHONE HYDROCHLORIDE 0.5 MG: 1 INJECTION, SOLUTION INTRAMUSCULAR; INTRAVENOUS; SUBCUTANEOUS at 22:28

## 2025-03-13 ASSESSMENT — PAIN - FUNCTIONAL ASSESSMENT: PAIN_FUNCTIONAL_ASSESSMENT: 0-10

## 2025-03-13 ASSESSMENT — PAIN DESCRIPTION - DESCRIPTORS
DESCRIPTORS: SHOOTING;SQUEEZING
DESCRIPTORS: ACHING

## 2025-03-13 ASSESSMENT — PAIN DESCRIPTION - LOCATION
LOCATION: ABDOMEN
LOCATION: LEG;ANKLE

## 2025-03-13 ASSESSMENT — PAIN DESCRIPTION - ORIENTATION: ORIENTATION: LEFT

## 2025-03-13 ASSESSMENT — PAIN SCALES - GENERAL
PAINLEVEL_OUTOF10: 10
PAINLEVEL_OUTOF10: 8

## 2025-03-14 PROBLEM — D64.9 ANEMIA: Chronic | Status: ACTIVE | Noted: 2025-01-10

## 2025-03-14 LAB
ANION GAP SERPL CALCULATED.3IONS-SCNC: 15 MMOL/L (ref 7–16)
BUN SERPL-MCNC: 67 MG/DL (ref 6–20)
CALCIUM SERPL-MCNC: 9.4 MG/DL (ref 8.6–10.2)
CHLORIDE SERPL-SCNC: 93 MMOL/L (ref 98–107)
CO2 SERPL-SCNC: 25 MMOL/L (ref 22–29)
CREAT SERPL-MCNC: 9.3 MG/DL (ref 0.5–1)
DATE, STOOL #1: ABNORMAL
EKG ATRIAL RATE: 104 BPM
EKG P AXIS: 59 DEGREES
EKG P-R INTERVAL: 116 MS
EKG Q-T INTERVAL: 328 MS
EKG QRS DURATION: 64 MS
EKG QTC CALCULATION (BAZETT): 431 MS
EKG R AXIS: 8 DEGREES
EKG T AXIS: 88 DEGREES
EKG VENTRICULAR RATE: 104 BPM
GFR, ESTIMATED: 5 ML/MIN/1.73M2
GLUCOSE BLD-MCNC: 287 MG/DL (ref 74–99)
GLUCOSE BLD-MCNC: 321 MG/DL (ref 74–99)
GLUCOSE BLD-MCNC: 323 MG/DL (ref 74–99)
GLUCOSE BLD-MCNC: 331 MG/DL (ref 74–99)
GLUCOSE BLD-MCNC: 390 MG/DL (ref 74–99)
GLUCOSE SERPL-MCNC: 295 MG/DL (ref 74–99)
HAPTOGLOB SERPL-MCNC: 123 MG/DL (ref 30–200)
HCT VFR BLD AUTO: 12.4 % (ref 34–48)
HCT VFR BLD AUTO: 16 % (ref 34–48)
HCT VFR BLD AUTO: 16.4 % (ref 34–48)
HCT VFR BLD AUTO: 19.1 % (ref 34–48)
HEMOCCULT SP1 STL QL: POSITIVE
HGB BLD-MCNC: 4.3 G/DL (ref 11.5–15.5)
HGB BLD-MCNC: 5.3 G/DL (ref 11.5–15.5)
HGB BLD-MCNC: 5.6 G/DL (ref 11.5–15.5)
HGB BLD-MCNC: 6.4 G/DL (ref 11.5–15.5)
LDH SERPL-CCNC: 140 U/L (ref 135–214)
POTASSIUM SERPL-SCNC: 4.9 MMOL/L (ref 3.5–5)
SODIUM SERPL-SCNC: 133 MMOL/L (ref 132–146)
TIME, STOOL #1: 1817

## 2025-03-14 PROCEDURE — 36415 COLL VENOUS BLD VENIPUNCTURE: CPT

## 2025-03-14 PROCEDURE — 2700000000 HC OXYGEN THERAPY PER DAY

## 2025-03-14 PROCEDURE — 96376 TX/PRO/DX INJ SAME DRUG ADON: CPT

## 2025-03-14 PROCEDURE — G0378 HOSPITAL OBSERVATION PER HR: HCPCS

## 2025-03-14 PROCEDURE — 2580000003 HC RX 258: Performed by: HOSPITALIST

## 2025-03-14 PROCEDURE — 82962 GLUCOSE BLOOD TEST: CPT

## 2025-03-14 PROCEDURE — 2500000003 HC RX 250 WO HCPCS: Performed by: HOSPITALIST

## 2025-03-14 PROCEDURE — 51798 US URINE CAPACITY MEASURE: CPT

## 2025-03-14 PROCEDURE — 83010 ASSAY OF HAPTOGLOBIN QUANT: CPT

## 2025-03-14 PROCEDURE — 6370000000 HC RX 637 (ALT 250 FOR IP): Performed by: INTERNAL MEDICINE

## 2025-03-14 PROCEDURE — 82270 OCCULT BLOOD FECES: CPT

## 2025-03-14 PROCEDURE — 80048 BASIC METABOLIC PNL TOTAL CA: CPT

## 2025-03-14 PROCEDURE — 96375 TX/PRO/DX INJ NEW DRUG ADDON: CPT

## 2025-03-14 PROCEDURE — 93010 ELECTROCARDIOGRAM REPORT: CPT | Performed by: INTERNAL MEDICINE

## 2025-03-14 PROCEDURE — 99223 1ST HOSP IP/OBS HIGH 75: CPT | Performed by: SURGERY

## 2025-03-14 PROCEDURE — 99223 1ST HOSP IP/OBS HIGH 75: CPT | Performed by: HOSPITALIST

## 2025-03-14 PROCEDURE — 36430 TRANSFUSION BLD/BLD COMPNT: CPT

## 2025-03-14 PROCEDURE — 83615 LACTATE (LD) (LDH) ENZYME: CPT

## 2025-03-14 PROCEDURE — 85014 HEMATOCRIT: CPT

## 2025-03-14 PROCEDURE — 6360000002 HC RX W HCPCS: Performed by: HOSPITALIST

## 2025-03-14 PROCEDURE — 6370000000 HC RX 637 (ALT 250 FOR IP): Performed by: HOSPITALIST

## 2025-03-14 PROCEDURE — P9016 RBC LEUKOCYTES REDUCED: HCPCS

## 2025-03-14 PROCEDURE — 85018 HEMOGLOBIN: CPT

## 2025-03-14 PROCEDURE — NBSRV NON-BILLABLE SERVICE: Performed by: INTERNAL MEDICINE

## 2025-03-14 PROCEDURE — 6360000002 HC RX W HCPCS: Performed by: INTERNAL MEDICINE

## 2025-03-14 RX ORDER — ACETAMINOPHEN 650 MG/1
650 SUPPOSITORY RECTAL EVERY 6 HOURS PRN
Status: DISCONTINUED | OUTPATIENT
Start: 2025-03-14 | End: 2025-03-25 | Stop reason: HOSPADM

## 2025-03-14 RX ORDER — SODIUM CHLORIDE 0.9 % (FLUSH) 0.9 %
5-40 SYRINGE (ML) INJECTION EVERY 12 HOURS SCHEDULED
Status: DISCONTINUED | OUTPATIENT
Start: 2025-03-14 | End: 2025-03-25 | Stop reason: HOSPADM

## 2025-03-14 RX ORDER — ONDANSETRON 2 MG/ML
4 INJECTION INTRAMUSCULAR; INTRAVENOUS EVERY 6 HOURS PRN
Status: DISCONTINUED | OUTPATIENT
Start: 2025-03-14 | End: 2025-03-25 | Stop reason: HOSPADM

## 2025-03-14 RX ORDER — SODIUM CHLORIDE 9 MG/ML
INJECTION, SOLUTION INTRAVENOUS PRN
Status: DISCONTINUED | OUTPATIENT
Start: 2025-03-14 | End: 2025-03-20

## 2025-03-14 RX ORDER — POLYETHYLENE GLYCOL 3350 17 G
2 POWDER IN PACKET (EA) ORAL
Status: DISCONTINUED | OUTPATIENT
Start: 2025-03-14 | End: 2025-03-25 | Stop reason: HOSPADM

## 2025-03-14 RX ORDER — GLUCAGON 1 MG/ML
1 KIT INJECTION PRN
Status: DISCONTINUED | OUTPATIENT
Start: 2025-03-14 | End: 2025-03-25 | Stop reason: HOSPADM

## 2025-03-14 RX ORDER — CARVEDILOL 25 MG/1
25 TABLET ORAL EVERY MORNING
Status: DISCONTINUED | OUTPATIENT
Start: 2025-03-14 | End: 2025-03-20

## 2025-03-14 RX ORDER — SODIUM CHLORIDE 9 MG/ML
INJECTION, SOLUTION INTRAVENOUS PRN
Status: DISCONTINUED | OUTPATIENT
Start: 2025-03-14 | End: 2025-03-25 | Stop reason: HOSPADM

## 2025-03-14 RX ORDER — DIPHENHYDRAMINE HCL 25 MG
25 TABLET ORAL EVERY 6 HOURS PRN
Status: DISCONTINUED | OUTPATIENT
Start: 2025-03-14 | End: 2025-03-25 | Stop reason: HOSPADM

## 2025-03-14 RX ORDER — ACETAMINOPHEN 325 MG/1
650 TABLET ORAL EVERY 6 HOURS PRN
Status: DISCONTINUED | OUTPATIENT
Start: 2025-03-14 | End: 2025-03-25 | Stop reason: HOSPADM

## 2025-03-14 RX ORDER — DEXTROSE MONOHYDRATE 100 MG/ML
INJECTION, SOLUTION INTRAVENOUS CONTINUOUS PRN
Status: DISCONTINUED | OUTPATIENT
Start: 2025-03-14 | End: 2025-03-25 | Stop reason: HOSPADM

## 2025-03-14 RX ORDER — SODIUM CHLORIDE 0.9 % (FLUSH) 0.9 %
5-40 SYRINGE (ML) INJECTION PRN
Status: DISCONTINUED | OUTPATIENT
Start: 2025-03-14 | End: 2025-03-25 | Stop reason: HOSPADM

## 2025-03-14 RX ORDER — INSULIN LISPRO 100 [IU]/ML
0-4 INJECTION, SOLUTION INTRAVENOUS; SUBCUTANEOUS
Status: DISCONTINUED | OUTPATIENT
Start: 2025-03-14 | End: 2025-03-25 | Stop reason: HOSPADM

## 2025-03-14 RX ORDER — BISACODYL 10 MG
10 SUPPOSITORY, RECTAL RECTAL DAILY PRN
Status: DISCONTINUED | OUTPATIENT
Start: 2025-03-14 | End: 2025-03-25 | Stop reason: HOSPADM

## 2025-03-14 RX ORDER — ACETAMINOPHEN 325 MG/1
650 TABLET ORAL EVERY 4 HOURS PRN
COMMUNITY

## 2025-03-14 RX ORDER — EPINEPHRINE 0.1 MG/ML
0.3 INJECTION INTRAVENOUS PRN
Status: DISCONTINUED | OUTPATIENT
Start: 2025-03-14 | End: 2025-03-14 | Stop reason: SDUPTHER

## 2025-03-14 RX ORDER — METHYLPREDNISOLONE SODIUM SUCCINATE 40 MG/ML
40 INJECTION INTRAMUSCULAR; INTRAVENOUS ONCE
Status: COMPLETED | OUTPATIENT
Start: 2025-03-14 | End: 2025-03-14

## 2025-03-14 RX ORDER — TRAMADOL HYDROCHLORIDE 50 MG/1
50 TABLET ORAL EVERY 6 HOURS PRN
Status: DISCONTINUED | OUTPATIENT
Start: 2025-03-14 | End: 2025-03-19

## 2025-03-14 RX ORDER — EPINEPHRINE 1 MG/ML
0.3 INJECTION, SOLUTION, CONCENTRATE INTRAVENOUS PRN
Status: DISCONTINUED | OUTPATIENT
Start: 2025-03-14 | End: 2025-03-25 | Stop reason: HOSPADM

## 2025-03-14 RX ORDER — INSULIN LISPRO 100 [IU]/ML
3 INJECTION, SOLUTION INTRAVENOUS; SUBCUTANEOUS
Status: DISCONTINUED | OUTPATIENT
Start: 2025-03-14 | End: 2025-03-19

## 2025-03-14 RX ORDER — ALBUTEROL SULFATE 0.83 MG/ML
5 SOLUTION RESPIRATORY (INHALATION) 4 TIMES DAILY PRN
Status: DISCONTINUED | OUTPATIENT
Start: 2025-03-14 | End: 2025-03-25 | Stop reason: HOSPADM

## 2025-03-14 RX ORDER — DIPHENHYDRAMINE HYDROCHLORIDE 50 MG/ML
25 INJECTION, SOLUTION INTRAMUSCULAR; INTRAVENOUS ONCE
Status: COMPLETED | OUTPATIENT
Start: 2025-03-14 | End: 2025-03-14

## 2025-03-14 RX ORDER — INSULIN GLARGINE 100 [IU]/ML
8 INJECTION, SOLUTION SUBCUTANEOUS NIGHTLY
Status: DISCONTINUED | OUTPATIENT
Start: 2025-03-14 | End: 2025-03-19

## 2025-03-14 RX ORDER — ONDANSETRON 4 MG/1
4 TABLET, ORALLY DISINTEGRATING ORAL EVERY 8 HOURS PRN
Status: DISCONTINUED | OUTPATIENT
Start: 2025-03-14 | End: 2025-03-25 | Stop reason: HOSPADM

## 2025-03-14 RX ADMIN — DIPHENHYDRAMINE HCL 25 MG: 25 TABLET ORAL at 22:33

## 2025-03-14 RX ADMIN — INSULIN LISPRO 3 UNITS: 100 INJECTION, SOLUTION INTRAVENOUS; SUBCUTANEOUS at 10:25

## 2025-03-14 RX ADMIN — HYDROMORPHONE HYDROCHLORIDE 0.2 MG: 1 INJECTION, SOLUTION INTRAMUSCULAR; INTRAVENOUS; SUBCUTANEOUS at 20:19

## 2025-03-14 RX ADMIN — INSULIN LISPRO 2 UNITS: 100 INJECTION, SOLUTION INTRAVENOUS; SUBCUTANEOUS at 16:49

## 2025-03-14 RX ADMIN — ONDANSETRON 4 MG: 4 TABLET, ORALLY DISINTEGRATING ORAL at 16:04

## 2025-03-14 RX ADMIN — TRAMADOL HYDROCHLORIDE 50 MG: 50 TABLET, COATED ORAL at 11:59

## 2025-03-14 RX ADMIN — FAMOTIDINE 10 MG: 10 INJECTION, SOLUTION INTRAVENOUS at 04:42

## 2025-03-14 RX ADMIN — SODIUM CHLORIDE, PRESERVATIVE FREE 10 ML: 5 INJECTION INTRAVENOUS at 10:32

## 2025-03-14 RX ADMIN — INSULIN LISPRO 3 UNITS: 100 INJECTION, SOLUTION INTRAVENOUS; SUBCUTANEOUS at 12:02

## 2025-03-14 RX ADMIN — DIPHENHYDRAMINE HYDROCHLORIDE 25 MG: 50 INJECTION INTRAMUSCULAR; INTRAVENOUS at 04:45

## 2025-03-14 RX ADMIN — INSULIN LISPRO 4 UNITS: 100 INJECTION, SOLUTION INTRAVENOUS; SUBCUTANEOUS at 03:11

## 2025-03-14 RX ADMIN — CARVEDILOL 25 MG: 25 TABLET, FILM COATED ORAL at 10:25

## 2025-03-14 RX ADMIN — INSULIN LISPRO 3 UNITS: 100 INJECTION, SOLUTION INTRAVENOUS; SUBCUTANEOUS at 16:50

## 2025-03-14 RX ADMIN — SODIUM CHLORIDE, PRESERVATIVE FREE 10 ML: 5 INJECTION INTRAVENOUS at 20:33

## 2025-03-14 RX ADMIN — INSULIN LISPRO 3 UNITS: 100 INJECTION, SOLUTION INTRAVENOUS; SUBCUTANEOUS at 06:15

## 2025-03-14 RX ADMIN — INSULIN LISPRO 3 UNITS: 100 INJECTION, SOLUTION INTRAVENOUS; SUBCUTANEOUS at 21:15

## 2025-03-14 RX ADMIN — ONDANSETRON 4 MG: 2 INJECTION, SOLUTION INTRAMUSCULAR; INTRAVENOUS at 02:15

## 2025-03-14 RX ADMIN — INSULIN LISPRO 3 UNITS: 100 INJECTION, SOLUTION INTRAVENOUS; SUBCUTANEOUS at 12:01

## 2025-03-14 RX ADMIN — INSULIN GLARGINE 8 UNITS: 100 INJECTION, SOLUTION SUBCUTANEOUS at 21:13

## 2025-03-14 RX ADMIN — METHYLPREDNISOLONE SODIUM SUCCINATE 40 MG: 40 INJECTION INTRAMUSCULAR; INTRAVENOUS at 04:41

## 2025-03-14 ASSESSMENT — PAIN DESCRIPTION - LOCATION
LOCATION: ABDOMEN
LOCATION: ABDOMEN
LOCATION: LEG

## 2025-03-14 ASSESSMENT — PAIN SCALES - GENERAL
PAINLEVEL_OUTOF10: 9
PAINLEVEL_OUTOF10: 6
PAINLEVEL_OUTOF10: 10

## 2025-03-14 ASSESSMENT — PAIN - FUNCTIONAL ASSESSMENT
PAIN_FUNCTIONAL_ASSESSMENT: PREVENTS OR INTERFERES SOME ACTIVE ACTIVITIES AND ADLS
PAIN_FUNCTIONAL_ASSESSMENT: ACTIVITIES ARE NOT PREVENTED

## 2025-03-14 ASSESSMENT — PAIN DESCRIPTION - DESCRIPTORS
DESCRIPTORS: ACHING;DISCOMFORT;SORE
DESCRIPTORS: ACHING;DISCOMFORT;SORE

## 2025-03-14 ASSESSMENT — PAIN SCALES - WONG BAKER: WONGBAKER_NUMERICALRESPONSE: NO HURT

## 2025-03-14 ASSESSMENT — PAIN DESCRIPTION - PAIN TYPE
TYPE: CHRONIC PAIN
TYPE: ACUTE PAIN

## 2025-03-14 ASSESSMENT — PAIN DESCRIPTION - ONSET
ONSET: ON-GOING
ONSET: ON-GOING

## 2025-03-14 ASSESSMENT — PAIN DESCRIPTION - FREQUENCY
FREQUENCY: CONTINUOUS
FREQUENCY: CONTINUOUS

## 2025-03-14 ASSESSMENT — PAIN DESCRIPTION - ORIENTATION
ORIENTATION: LEFT
ORIENTATION: LEFT;RIGHT;LOWER
ORIENTATION: LOWER

## 2025-03-14 NOTE — PROGRESS NOTES
Spoke with Dr. Beltrán regarding patient's blood status and the transfer to [facility].  Patient's wish is the [facility] [place].  [place] transfer center asked that patient be more stable blood [facility]. Dr. Beltrán wants patient transfused as long as her hemoglobin is below 7.

## 2025-03-14 NOTE — PROGRESS NOTES
Spiritual Health History and Assessment/Progress Note  [facility]    Attempted Encounter,  ,  ,      Name: Michelle Nettles MRN: [id number]    Age: 39 y.o.     Sex: female   Language: English   Nondenominational: Yazidism   Anemia     Date: 3/14/2025                           Spiritual Assessment began in [facility] 7S INTERMDIATE MED SURG        Referral/Consult From: Rounding   Encounter Overview/Reason: Attempted Encounter  Service Provided For: Patient    Jillian, Belief, Meaning:   Patient unable to assess at this time  Family/Friends No family/friends present      Importance and Influence:  Patient unable to assess at this time  Family/Friends No family/friends present    Community:  Patient Patient declined  visit at this time.  left devotional material and information about  services.  Family/Friends No family/friends present    Assessment and Plan of Care:     Patient Interventions include: Patient declined  visit at this time.  left devotional material and information about  services.  Family/Friends Interventions include: No family/friends present    Patient Plan of Care: Spiritual Care available upon further referral  Family/Friends Plan of Care: No family/friends present    Electronically signed by Chaplain Juan David on 3/14/2025 at 7:14 PM

## 2025-03-14 NOTE — ED PROVIDER NOTES
[facility] MED SURG  EMERGENCY DEPARTMENT ENCOUNTER        Pt Name: Michelle Nettles  MRN: [id number]  Birthdate 1985  Date of evaluation: 3/13/2025  Provider: Glenys Nolen DO  PCP: Rogers Rodríguez MD  Note Started: 8:01 PM EDT 3/13/25    CHIEF COMPLAINT       Chief Complaint   Patient presents with    Low Hgb     Labs drawn this morning at [facility] and [facility]. Hgb 5.8. Recent discharge from [facility] for vomiting/diarrhea.        HISTORY OF PRESENT ILLNESS: 1 or more Elements   History From: Patient    Limitations to history : None  Social Determinants : None    Michelle Nettles is a 39 y.o. female who presents for low hemoglobin and fatigue.  Patient states that she was just discharged from the hospital for low hemoglobin and surgery.  She states that she has been feeling more fatigued today.  She has been feeling nauseous, but no vomiting.  She denies any diarrhea.  She has been having some discomfort in her upper abdomen.  Patient has chronically low hemoglobin.  She states she had multiple units of PRBCs while in the hospital.  Patient had outpatient labs drawn at [facility] today and her hemoglobin was 5.8.  Patient states that she did miss her dialysis earlier today.  Patient's sugar has also been elevated today as high as 400.  She states that she was given insulin at the nursing home today.  Denies any fever, chills, headache, dizziness, vision changes, neck tenderness or stiffness, weakness, cp, palpitations, leg swelling/tenderness, sob, cough, dysuria, hematuria, constipation, bloody stools.    Nursing Notes were all reviewed and agreed with or any disagreements were addressed in the HPI.    ROS:   Pertinent positives and negatives are stated within HPI, all other systems reviewed and are negative.      --------------------------------------------- PAST HISTORY ---------------------------------------------  Past Medical History:  has a past medical history

## 2025-03-14 NOTE — PROCEDURES
PROCEDURE NOTE  Date: 3/13/2025   Name: Michelle Nettles  YOB: 1985    Procedures  PROCEDURE  3/13/25       Time: 20:45    PERIPHERAL LINE INSERTION  Risks, benefits and alternatives (for applicable procedures below) described.   Performed By: Carlos Cameron DO.    Indication: inability to gain peripheral access.  Informed consent: The patient provided verbal consent for this procedure..  Procedure: After routine sterile preparation, a 18g catheter was placed using ultrasound guidance in right upper extremity and secured by standard fashion.   Ultrasound Guidance:   used.  Number of Attempts: 1  Patient tolerated the procedure well.

## 2025-03-14 NOTE — PROGRESS NOTES
Consult for     persistent severe anemia, possible Occult GI bleed though diagnosis is in question.   Called to Dr. Luan Moctezuma took this consult

## 2025-03-14 NOTE — CONSULTS
Consult Note      Reason for Consult: ESRD needing dialysis  Date of Service: 3/14/2025   Chief Complaint: had concerns including Low Hgb (Labs drawn this morning at [facility]. Hgb 5.8. Recent discharge from [facility] for vomiting/diarrhea. ).        HISTORY OF PRESENT ILLNESS:     Michelle Nettles is a 39 y.o. female with a past medical history of anemia of stage V CKD, recent left trimalleolar fracture, ESRD, poorly controlled IDDM, chronic HFpEF, & hidradenitis suppurativa who presented to the ED from [facility] for fatigue with low Hgb.  Patient recently hospitalized at [place] after presenting with N/V/D on 3/3/2024.  Hgb was 5.2.  S/p repeat EGD showed gastritis & Schatzki's ring but no source of bleeding.  S/p explantation of left ankle hardware.  Patient is frustrated she had to return to the hospital once again.  She is conversationally dyspneic.  Over the past 2 days her face has been swelling up.  Was called to the bedside out of concern for transfusion reaction.  Held transfusion for now.  Gave Benadryl, Pepcid and 1 dose of IV Solu-Medrol. .     Past Medical History:   Past Medical History:   Diagnosis Date    JOLLY (acute kidney injury) 04/05/2016    Anemia     AVF (arteriovenous fistula) 02/19/2018    let arm    Cellulitis, face     CHF (congestive heart failure) ([facility])     Chronic kidney disease     Chronic respiratory failure with hypoxia ([facility])     Dialysis AV fistula malfunction, initial encounter 11/07/2019    Displaced trimalleolar fracture of left lower leg, closed, initial encounter 02/22/2025    DM type 1 (diabetes mellitus, type 1) ([facility])     Encounter regarding vascular access for dialysis for ESRD ([facility]) 07/12/2018    ESRD (end stage renal disease) ([facility])     Hemodialysis patient     amrita eunice mon wed fri / graft in left arm    Hidradenitis suppurativa     Hypercalcemia     Hyperkalemia     Hypertension     Iron deficiency anemia secondary to blood  loss (chronic)     Other acute gastritis without hemorrhage     Tobacco abuse 2016    Type 2 diabetes mellitus with hyperglycemia (HCC)     Vitamin B-complex deficiency        Past Surgical History:   Past Surgical History:   Procedure Laterality Date    ANKLE SURGERY Left 3/7/2025    REMOVAL OF EXTERNAL FIXATOR FROM LEFT LOWER EXTREMITY, OPEN REDUCTION WITH INTERNAL FIXATION LEFT ANKLE TRIMALLEOLAR FRACTURE performed by Peyman Germain MD at [facility] OR     SECTION      COLONOSCOPY N/A 2024    COLONOSCOPY DIAGNOSTIC performed by Balaji Gonzalez MD at [facility] ENDOSCOPY    CYST INCISION AND DRAINAGE  2011    perirectal abscess. [facility]. Dr. Fleming    DIALYSIS FISTULA CREATION Left 2019    LEFT ARM FISTULAGRAM, BALLOON ANGIOPLASTY performed by Haylee Zaidi MD at [facility] OR    DILATION AND CURETTAGE OF UTERUS      FRACTURE SURGERY      fracture right ulnar, left tibia, and pelvis    INVASIVE VASCULAR N/A 2023    Fistulogram left performed by Haylee Zaidi MD at [facility] CARDIAC CATH LAB    INVASIVE VASCULAR Left 2023    Angioplasty fistula/dialysis circuit performed by Haylee Zaidi MD at [facility] CARDIAC CATH LAB    LEG SURGERY Left 2/15/2025    Application of left ankle spanning external fixator performed by Peyman Germain MD at [facility] OR    OTHER SURGICAL HISTORY      open reduction internal fixation left radial shaft    OTHER SURGICAL HISTORY  2016    pericardial window    OTHER SURGICAL HISTORY  2016     r tesio insertion  / remove 2018    OTHER SURGICAL HISTORY Left 2017    insertion a-v fistula left arm    MS ARTERIOVENOUS ANASTOMOSIS OPEN DIRECT Left 2018    REVISION AV FISTULA - LEFT ARM performed by Haylee Zaidi MD at [facility] OR    MS INSJ NON-TUNNELED CENTRAL VENOUS CATH AGE 5 YR/> N/A 5/10/2018    TESIO CATHETER INSERTION performed by Cornelius Morris MD at [facility] OR    MS  needed  aspirin 81 MG EC tablet, Take 1 tablet by mouth 2 times daily  insulin lispro, 1 Unit Dial, (HUMALOG KWIKPEN) 100 UNIT/ML SOPN, Inject 3 Units into the skin 3 times daily (before meals) *Plus Sliding Scale* max daily dose 50u (Patient taking differently: Inject into the skin 3 times daily (before meals) Follow sliding scale)  insulin glargine (LANTUS SOLOSTAR) 100 UNIT/ML injection pen, Inject 10 Units into the skin nightly (Patient taking differently: Inject 8 Units into the skin nightly)  ferrous sulfate (IRON 325) 325 (65 Fe) MG tablet, Take 1 tablet by mouth every other day  pantoprazole (PROTONIX) 40 MG tablet, Take 1 tablet by mouth 2 times daily (before meals)  vitamin B-12 (CYANOCOBALAMIN) 500 MCG tablet, Take 1 tablet by mouth daily  clopidogrel (PLAVIX) 75 MG tablet, Take 1 tablet by mouth daily  docusate sodium (COLACE) 100 MG capsule, Take 1 capsule by mouth daily  fluticasone (FLONASE) 50 MCG/ACT nasal spray, 2 sprays by Each Nostril route daily  sucralfate (CARAFATE) 1 GM tablet, Take 1 tablet by mouth 4 times daily (before meals and nightly)  carvedilol (COREG) 25 MG tablet, TAKE 1 TABLET BY MOUTH DAILY WITH SUPPER (Patient taking differently: Take 1 tablet by mouth every morning)  Multiple Vitamin (DAILY JACOB) TABS, Take 800 mg by mouth daily  albuterol sulfate HFA (PROVENTIL;VENTOLIN;PROAIR) 108 (90 Base) MCG/ACT inhaler, Inhale 2 puffs into the lungs every 4-6 hours as needed for Wheezing or Shortness of Breath  OXYGEN, Inhale 5 L into the lungs continuous  enoxaparin Sodium (LOVENOX) 30 MG/0.3ML injection, Inject 0.3 mLs into the skin daily  Blood Pressure Monitoring (BLOOD PRESSURE KIT) LIANNE, Check BP daily  Misc. Devices (PULSE OXIMETER FOR FINGER) MISC, Check HR and pulse ox daily and PRN symptoms  Accu-Chek Softclix Lancets MISC, 1 each by Does not apply route 4 times daily  Glucagon, rDNA, (GLUCAGON EMERGENCY) 1 MG KIT, Inject 1 mg as directed as needed (for blood glucose level

## 2025-03-14 NOTE — PROGRESS NOTES
Consult received however patient is known to the [facility]. Nursing communication order placed and consultation switched. Please contact [facility] for heme onc needs. Thank you.    Franchesca MCMULLEN- CNP  [facility]

## 2025-03-14 NOTE — PLAN OF CARE
Problem: Chronic Conditions and Co-morbidities  Goal: Patient's chronic conditions and co-morbidity symptoms are monitored and maintained or improved  3/14/2025 1858 by Beharry, Phyllis, RN  Outcome: Progressing  3/14/2025 0511 by Maritza Segura RN  Outcome: Not Progressing     Problem: Discharge Planning  Goal: Discharge to home or other facility with appropriate resources  3/14/2025 1858 by Beharry, Phyllis, RN  Outcome: Progressing  3/14/2025 0511 by Maritza Segura RN  Outcome: Not Progressing     Problem: Pain  Goal: Verbalizes/displays adequate comfort level or baseline comfort level  3/14/2025 1858 by Beharry, Phyllis, RN  Outcome: Progressing  3/14/2025 0511 by Maritza Segura RN  Outcome: Progressing     Problem: Safety - Adult  Goal: Free from fall injury  3/14/2025 1858 by Beharry, Phyllis, RN  Outcome: Progressing  3/14/2025 0511 by Maritza Segura RN  Outcome: Progressing     Problem: Chronic Conditions and Co-morbidities  Goal: Patient's chronic conditions and co-morbidity symptoms are monitored and maintained or improved  3/14/2025 1858 by Beharry, Phyllis, RN  Outcome: Progressing  3/14/2025 0511 by Maritza Segura RN  Outcome: Not Progressing     Problem: Discharge Planning  Goal: Discharge to home or other facility with appropriate resources  3/14/2025 1858 by Beharry, Phyllis, RN  Outcome: Progressing  3/14/2025 0511 by Maritza Segura RN  Outcome: Not Progressing

## 2025-03-14 NOTE — CONSENT
Informed Consent for Blood Component Transfusion Note    I have discussed with the patient the rationale for blood component transfusion; its benefits in treating or preventing fatigue, organ damage, or death; and its risk which includes mild transfusion reactions, rare risk of blood borne infection, or more serious but rare reactions. I have discussed the alternatives to transfusion, including the risk and consequences of not receiving transfusion. The patient had an opportunity to ask questions and had agreed to proceed with transfusion of blood components.    Electronically signed by Nathan Beltrán MD on 3/14/25 at 4:51 PM EDT

## 2025-03-14 NOTE — PLAN OF CARE
Patient admitted by erick this morning.  Per H&P:    39 y.o.-year-old female with a PMH of anemia of stage V CKD, recent left trimalleolar fracture, ESRD, poorly controlled IDDM, chronic HFpEF, & hidradenitis suppurativa who presented to the ED from [facility] for fatigue with low Hgb.  Patient recently hospitalized at [place] after presenting with N/V/D on 3/3/2024.  Hgb was 5.2.  S/p repeat EGD showed gastritis & Schatzki's ring but no source of bleeding.  S/p explantation of left ankle hardware.  Patient is frustrated she had to return to the hospital once again.  She is conversationally dyspneic.  Over the past 2 days her face has been swelling up.  Was called to the bedside out of concern for transfusion reaction.  Held transfusion for now.  Given Benadryl, Pepcid and 1 dose of IV Solu-Medrol.  Patient was admitted for further evaluation and treatment.     Principal Problem:    Anemia  Active Problems:    Chronic systolic (congestive) heart failure    Hidradenitis suppurativa    History of venous thromboembolism    MRSA colonization    ESRD needing dialysis (HCC)    Chronic hypoxic respiratory failure, on home oxygen therapy (HCC)    Poorly controlled type 2 diabetes mellitus (HCC)    Acute superficial gastritis without hemorrhage    Schatzki's ring of distal esophagus    Presence of artificial lower extremity  Resolved Problems:    * No resolved hospital problems. *         PLAN:     Acute on chronic severe Symptomatic Anemia: POA.  BL around 7/8 g/dL purported history of iron deficiency anemia but repeat Fe studies over the past year not consistent with iron deficiency.  Receiving 2 units packed RBCs.  Follow-up H&H.  Patient states she is on iron and has has dark hard stools.  Denies any bloody stools.  Denies any melena.  Consult hematology consult appreciated discussed case no evidence of intravascular hemolysis Occult GI bleed:?  S/p C-scope in 7/2024 with internal

## 2025-03-14 NOTE — PROGRESS NOTES
Subjective:  Chart reviewed  Recent history with multiple hospital admissions  Patient with ESRD on HD M-W-F, IDDM, chronic hypoxic respiratory failure  Trimalleolar fracture with surgical repair after fall, N/V, anemia  GI workup for anemia EGD only/gastritis colonoscopy not done H/H stablized per note 3/11  3/13 returned to ED Hgb 5.8  S/p PRBC  Iron studies 3/2, iron sat 14%, ferritin 360 (multiple transfusions since)  Received VALENTIN at HD and has required IV iron and transfusions in the past.  Seen at [facility] previously then VALENTIN given per HD  The patient is awake and alert.  No problems overnight  Nausea improved currently, constipated on presentation but BM this am  Tolerating diet.    Denies chest pain, angina, dyspnea, abdominal discomfort    Objective:    BP (!) 157/61   Pulse (!) 106   Temp 98.2 °F (36.8 °C) (Oral)   Resp 18   Ht 1.499 m (4' 11\")   Wt 68 kg (150 lb)   LMP  (LMP Unknown)   SpO2 100%   BMI 30.30 kg/m²     General: NAD. Awake and alert  HEENT: face edematous, mucosa dry without ulcerations,thrush or mucositis, EOMI, PERRLA, sclera anicteric, conjuntiva pink  NECK: supple, trachea midline  Heart:  tachycardic, no murmurs, gallops, or rubs.  Lungs:  CTA bilaterally, no wheeze, rales or rhonchi  Abd: BS present, nontender, nondistended, no masses  Extrem:  swelling and dressing from recent surgery left leg, No clubbing, cyanosis, or edema (right leg)  Lymphatics: No palpable adenopathy in cervical and supraclavicular regions  : deferred  Skin: Intact, no petechia or purpura    CBC with Differential:    Lab Results   Component Value Date/Time    WBC 5.3 03/13/2025 08:45 PM    RBC 1.93 03/13/2025 08:45 PM    RBC 2.92 02/22/2022 08:11 PM    HGB 5.3 03/14/2025 06:58 AM    HCT 16.4 03/14/2025 06:58 AM     03/13/2025 08:45 PM    MCV 89.1 03/13/2025 08:45 PM    MCH 30.1 03/13/2025 08:45 PM    MCHC 33.7 03/13/2025 08:45 PM    RDW 13.8 03/13/2025 08:45 PM    LYMPHOPCT 19 03/13/2025

## 2025-03-14 NOTE — PROGRESS NOTES
The patient stated they were feeling lethargic and dizzy after her first blood transfusion, as well as her blood sugar was 390. I requested the nurse practitioner to assess the patient before I hung the second unit of blood. Dr. Feliz came up to assess the patient at bedside and verbally ordered the blood transfusion to stop.

## 2025-03-14 NOTE — CONSULTS
General Surgery   Consult Note      Patient's Name/Date of Birth: Michelle Nettles / 1985    Date: March 14, 2025     PCP: Rogers Rodríguez MD     Reason for consult:: Anemia     HPI:   Michelle Nettles is a 39 y.o. female who presents for evaluation of fatigue. Patient is known to have chronic anemia. She was found to have low hgb at 4.3. She was admitted because of that. She currently denies any blood in her stool, melena or hematemsis. She was seen multiple times by general surgery [place] and had to EGDs this year. She had a cscope on July last year taht was negatvie. She is on eliquis, lovenox and asa per ortho s/p ankle surgery.     Patient has extensive medical history including DM, ESRD, CHF, HTN. Surgical history include csection.     No CTAP to review in ED.       Patient Active Problem List   Diagnosis    Poorly controlled type 1 diabetes mellitus (HCC)    Uncontrolled type 1 diabetes mellitus with hyperglycemia (HCC)    Hyperkalemia    Nasal polyp    Periorbital cellulitis of right eye    Periorbital cellulitis    Vitamin B deficiency, unspecified    Nasal congestion    Hidradenitis suppurativa    Symptomatic anemia    Normocytic anemia due to blood loss    Acute decompensated heart failure (HCC)    Gall stone    Dietary noncompliance    Metabolic encephalopathy    Hypoxia    Hypotension    History of venous thromboembolism    Chronic systolic (congestive) heart failure    MRSA colonization    ESRD on hemodialysis (HCC)    SOB (shortness of breath)    ESRD needing dialysis (HCC)    Chronic hypoxic respiratory failure, on home oxygen therapy (HCC)    Acute on chronic anemia    Poorly controlled type 2 diabetes mellitus (HCC)    Hypercalcemia    GI bleed    Hypoglycemia    Hypervolemia    Anemia    Acute superficial gastritis without hemorrhage    Schatzki's ring of distal esophagus    Closed trimalleolar fracture of left ankle    Syncope and collapse    Oxygen dependent

## 2025-03-14 NOTE — H&P
[facility] Hospitalist Group History and Physical      ASSESSMENT:      Principal Problem:    Anemia  Active Problems:    ESRD needing dialysis (HCC)    Acute superficial gastritis without hemorrhage    Schatzki's ring of distal esophagus    Chronic systolic (congestive) heart failure    Chronic hypoxic respiratory failure, on home oxygen therapy (HCC)    Poorly controlled type 2 diabetes mellitus (HCC)    Hidradenitis suppurativa    History of venous thromboembolism    MRSA colonization    Presence of artificial lower extremity  Resolved Problems:    * No resolved hospital problems. *        PLAN:    Acute on chronic severe Symptomatic Anemia: POA.  BL around 7/8 g/dL purported history of SAMMI but repeat Fe studies over the past year not consistent with iron deficiency.  Receiving 2 units packed RBCs.  Follow-up H&H.  Consult hematology  Occult GI bleed:?  S/p unremarkable C-scope in 7/2024.  Multiple EGDs.  Needs pill endoscopy  Facial swelling: Etiology unclear, over the past 3 days.  She did undergo dialysis on Wednesday and they removed 1.5 L of ultrafiltrate.  IDDM with hyperglycemia: Labile blood sugars.  Continue Lantus 8 units nightly.  3 units AC 3 times daily with sliding scale.  hypertension, resume home meds  ESRD on dialysis, nephrology consulted for dialysis needs  Chronic elevation of troponin in the setting of ESRD, denies chest pain, monitor    All services rendered & documented were deemed medically necessary by me and were appropriate for this patient's condition.      Consultations Ordered:  Nephrology    I performed a comprehensive review of the patient's prior external notes, labs, & imaging, initial diagnostic tests, and other clinically relevant data.    Code Status: Full Code   VTE prophylaxis: SCDs   dispo: place and observation intermediate telemetry      Patient is high risk for complications resulting in significant morbidity and/or Mortality    CHIEF COMPLAINT:  had concerns  utilized to reduce the radiation dose to as low as reasonably achievable. COMPARISON: None. HISTORY: ORDERING SYSTEM PROVIDED HISTORY: syncope TECHNOLOGIST PROVIDED HISTORY: Reason for exam:->syncope Decision Support Exception - unselect if not a suspected or confirmed emergency medical condition->Emergency Medical Condition (MA) FINDINGS: BONES/ALIGNMENT: There is no acute fracture or traumatic malalignment. DEGENERATIVE CHANGES: No severe osseous spinal canal stenosis. SOFT TISSUES: There is no prevertebral soft tissue swelling.     No acute abnormality of the cervical spine.     XR CHEST PORTABLE  Result Date: 2/14/2025  EXAMINATION: ONE XRAY VIEW OF THE CHEST 2/14/2025 1:34 pm COMPARISON: Portable chest from 01/10/2025. HISTORY: ORDERING SYSTEM PROVIDED HISTORY: syncope TECHNOLOGIST PROVIDED HISTORY: Reason for exam:->syncope FINDINGS: The lungs remain clear. There is no sign of any infiltrate or effusion. The appearance of prominence of interstitial markings is simply from fullness of the overlying soft tissues. The heart remains top normal in size.  The mediastinum is normal in appearance. The metal plate and anchoring screws from ORIF of a fracture of the midshaft of the right humerus are only partially included on today's study. Continued inferior subluxation of the right humeral head suggests ongoing disruption or laxity of the capsular structures of the right shoulder.     No acute cardiopulmonary disease.     XR ANKLE LEFT (MIN 3 VIEWS)  Result Date: 2/14/2025  EXAMINATION: THREE XRAY VIEWS OF THE LEFT ANKLE 2/14/2025 2:34 pm COMPARISON: None. HISTORY: ORDERING SYSTEM PROVIDED HISTORY: r/o fracture TECHNOLOGIST PROVIDED HISTORY: Reason for exam:->r/o fracture FINDINGS: There is an acute trimalleolar fracture of the left ankle with minimal lateral subluxation of the talus in relation to the tibia.  There is an oblique comminuted fibular fracture above the syndesmosis, a displaced transverse fracture of the

## 2025-03-14 NOTE — CARE COORDINATION
Met with patient about diagnosis and discharge plan of care. Pt admit for anemia from [facility]. Pt up in chair. Pt alert/oriented x4. O2 4L/NC continuous. Pt is HD pt. Pt had fx on 2/15 of left ankle/hardware removal on 3/7. Prior to SNF, pt lives in ranch home with daughter. No DME. HD [facility] with chair time 1230 pm. PCP is Dr Rodríguez and renal Dr Car. Pt wears 4L/NC at home and uses [employer]. Has all diabetic supplies. Renal and hematology consults pending. Call placed to [place] liaison for [facility]. Discharge plan TBD pending progress. Will follow-o

## 2025-03-14 NOTE — PROGRESS NOTES
4 Eyes Skin Assessment     NAME:  Michelle Nettles  YOB: 1985  MEDICAL RECORD NUMBER:  [id number]    The patient is being assessed for  Admission    I agree that at least one RN has performed a thorough Head to Toe Skin Assessment on the patient. ALL assessment sites listed below have been assessed.      Areas assessed by both nurses:    Head, Face, Ears, Shoulders, Back, Chest, Arms, Elbows, Hands, Sacrum. Buttock, Coccyx, Ischium, and Legs. Feet and Heels        Does the Patient have a Wound? No noted wound(s)       Edgar Prevention initiated by RN: No  Wound Care Orders initiated by RN: No    Pressure Injury (Stage 3,4, Unstageable, DTI, NWPT, and Complex wounds) if present, place Wound referral order by RN under : No    New Ostomies, if present place, Ostomy referral order under : No     Nurse 1 eSignature: Electronically signed by Maritza Segura RN on 3/14/25 at 5:30 AM EDT    **SHARE this note so that the co-signing nurse can place an eSignature**    Nurse 2 eSignature: {Esignature:[id number]}

## 2025-03-14 NOTE — ED NOTES
ED to Inpatient Handoff Report    Notified 7S that electronic handoff available and patient ready for transport to room 728.    Safety Risks: None identified    Patient in Restraints: no    Constant Observer or Patient : no    Telemetry Monitoring Ordered: No          Order to transfer to unit without monitor: NO    Last MEWS: 2 Time completed: 0106         Vitals:    03/13/25 2246 03/13/25 2315 03/14/25 0000 03/14/25 0106   BP: (!) 166/70 (!) 154/57 (!) 157/53 (!) 165/81   Pulse: (!) 119 (!) 114 (!) 106 (!) 103   Resp: 14 18 23 18   Temp: 98.5 °F (36.9 °C)   98.5 °F (36.9 °C)   TempSrc: Oral   Oral   SpO2: 100%   100%   Weight:       Height:           Opportunity for questions and clarification was provided.

## 2025-03-14 NOTE — ACP (ADVANCE CARE PLANNING)
Advance Care Planning   Healthcare Decision Maker:    Primary Decision Maker: Deana Musa - Brother/Sister - [phone]    Secondary Decision Maker: Cain Musa - Brother/Sister - [phone]    Supplemental (Other) Decision Maker: Nicole Muas - Other - [phone]    Supplemental (Other) Decision Maker: Barbie Celeste - Brother/Sister - [phone]    Supplemental (Other) Decision Maker: Santosh Pink - Aunt/Uncle - [phone]    Click here to complete Healthcare Decision Makers including selection of the Healthcare Decision Maker Relationship (ie \"Primary\").

## 2025-03-15 ENCOUNTER — APPOINTMENT (OUTPATIENT)
Dept: ULTRASOUND IMAGING | Age: 40
DRG: 811 | End: 2025-03-15
Payer: MEDICARE

## 2025-03-15 ENCOUNTER — APPOINTMENT (OUTPATIENT)
Dept: NUCLEAR MEDICINE | Age: 40
DRG: 811 | End: 2025-03-15
Payer: MEDICARE

## 2025-03-15 LAB
ANION GAP SERPL CALCULATED.3IONS-SCNC: 15 MMOL/L (ref 7–16)
BASOPHILS # BLD: 0.02 K/UL (ref 0–0.2)
BASOPHILS NFR BLD: 0 % (ref 0–2)
BUN SERPL-MCNC: 89 MG/DL (ref 6–20)
CALCIUM SERPL-MCNC: 9.1 MG/DL (ref 8.6–10.2)
CHLORIDE SERPL-SCNC: 93 MMOL/L (ref 98–107)
CO2 SERPL-SCNC: 24 MMOL/L (ref 22–29)
CREAT SERPL-MCNC: 11 MG/DL (ref 0.5–1)
EOSINOPHIL # BLD: 0.15 K/UL (ref 0.05–0.5)
EOSINOPHILS RELATIVE PERCENT: 3 % (ref 0–6)
ERYTHROCYTE [DISTWIDTH] IN BLOOD BY AUTOMATED COUNT: 13.6 % (ref 11.5–15)
GFR, ESTIMATED: 4 ML/MIN/1.73M2
GLUCOSE BLD-MCNC: 323 MG/DL (ref 74–99)
GLUCOSE BLD-MCNC: 373 MG/DL (ref 74–99)
GLUCOSE BLD-MCNC: 374 MG/DL (ref 74–99)
GLUCOSE SERPL-MCNC: 375 MG/DL (ref 74–99)
HCT VFR BLD AUTO: 18.5 % (ref 34–48)
HCT VFR BLD AUTO: 18.7 % (ref 34–48)
HCT VFR BLD AUTO: 24.6 % (ref 34–48)
HGB BLD-MCNC: 6.3 G/DL (ref 11.5–15.5)
HGB BLD-MCNC: 6.4 G/DL (ref 11.5–15.5)
HGB BLD-MCNC: 8.4 G/DL (ref 11.5–15.5)
IMM GRANULOCYTES # BLD AUTO: <0.03 K/UL (ref 0–0.58)
IMM GRANULOCYTES NFR BLD: 0 % (ref 0–5)
LYMPHOCYTES NFR BLD: 1.05 K/UL (ref 1.5–4)
LYMPHOCYTES RELATIVE PERCENT: 19 % (ref 20–42)
MCH RBC QN AUTO: 29.6 PG (ref 26–35)
MCHC RBC AUTO-ENTMCNC: 34.2 G/DL (ref 32–34.5)
MCV RBC AUTO: 86.6 FL (ref 80–99.9)
MONOCYTES NFR BLD: 0.66 K/UL (ref 0.1–0.95)
MONOCYTES NFR BLD: 12 % (ref 2–12)
NEUTROPHILS NFR BLD: 65 % (ref 43–80)
NEUTS SEG NFR BLD: 3.53 K/UL (ref 1.8–7.3)
PLATELET # BLD AUTO: 201 K/UL (ref 130–450)
PMV BLD AUTO: 9.6 FL (ref 7–12)
POTASSIUM SERPL-SCNC: 4.8 MMOL/L (ref 3.5–5)
POTASSIUM SERPL-SCNC: 5.7 MMOL/L (ref 3.5–5)
RBC # BLD AUTO: 2.16 M/UL (ref 3.5–5.5)
SODIUM SERPL-SCNC: 132 MMOL/L (ref 132–146)
WBC OTHER # BLD: 5.4 K/UL (ref 4.5–11.5)

## 2025-03-15 PROCEDURE — 96376 TX/PRO/DX INJ SAME DRUG ADON: CPT

## 2025-03-15 PROCEDURE — 85014 HEMATOCRIT: CPT

## 2025-03-15 PROCEDURE — 90935 HEMODIALYSIS ONE EVALUATION: CPT

## 2025-03-15 PROCEDURE — 76770 US EXAM ABDO BACK WALL COMP: CPT

## 2025-03-15 PROCEDURE — A9560 TC99M LABELED RBC: HCPCS | Performed by: RADIOLOGY

## 2025-03-15 PROCEDURE — 84132 ASSAY OF SERUM POTASSIUM: CPT

## 2025-03-15 PROCEDURE — 36430 TRANSFUSION BLD/BLD COMPNT: CPT

## 2025-03-15 PROCEDURE — 6370000000 HC RX 637 (ALT 250 FOR IP): Performed by: HOSPITALIST

## 2025-03-15 PROCEDURE — G0378 HOSPITAL OBSERVATION PER HR: HCPCS

## 2025-03-15 PROCEDURE — 99232 SBSQ HOSP IP/OBS MODERATE 35: CPT | Performed by: HOSPITALIST

## 2025-03-15 PROCEDURE — 85018 HEMOGLOBIN: CPT

## 2025-03-15 PROCEDURE — 82962 GLUCOSE BLOOD TEST: CPT

## 2025-03-15 PROCEDURE — 3430000000 HC RX DIAGNOSTIC RADIOPHARMACEUTICAL: Performed by: RADIOLOGY

## 2025-03-15 PROCEDURE — 2700000000 HC OXYGEN THERAPY PER DAY

## 2025-03-15 PROCEDURE — 6360000002 HC RX W HCPCS: Performed by: INTERNAL MEDICINE

## 2025-03-15 PROCEDURE — 36415 COLL VENOUS BLD VENIPUNCTURE: CPT

## 2025-03-15 PROCEDURE — 85025 COMPLETE CBC W/AUTO DIFF WBC: CPT

## 2025-03-15 PROCEDURE — 78278 ACUTE GI BLOOD LOSS IMAGING: CPT

## 2025-03-15 PROCEDURE — 2500000003 HC RX 250 WO HCPCS: Performed by: HOSPITALIST

## 2025-03-15 PROCEDURE — P9016 RBC LEUKOCYTES REDUCED: HCPCS

## 2025-03-15 PROCEDURE — 80048 BASIC METABOLIC PNL TOTAL CA: CPT

## 2025-03-15 RX ORDER — SODIUM CHLORIDE 9 MG/ML
INJECTION, SOLUTION INTRAVENOUS PRN
Status: COMPLETED | OUTPATIENT
Start: 2025-03-15 | End: 2025-03-18

## 2025-03-15 RX ORDER — PANTOPRAZOLE SODIUM 40 MG/1
40 TABLET, DELAYED RELEASE ORAL
Status: DISCONTINUED | OUTPATIENT
Start: 2025-03-16 | End: 2025-03-16

## 2025-03-15 RX ADMIN — INSULIN LISPRO 3 UNITS: 100 INJECTION, SOLUTION INTRAVENOUS; SUBCUTANEOUS at 11:05

## 2025-03-15 RX ADMIN — INSULIN LISPRO 3 UNITS: 100 INJECTION, SOLUTION INTRAVENOUS; SUBCUTANEOUS at 17:12

## 2025-03-15 RX ADMIN — HYDROMORPHONE HYDROCHLORIDE 0.2 MG: 1 INJECTION, SOLUTION INTRAMUSCULAR; INTRAVENOUS; SUBCUTANEOUS at 11:10

## 2025-03-15 RX ADMIN — INSULIN LISPRO 4 UNITS: 100 INJECTION, SOLUTION INTRAVENOUS; SUBCUTANEOUS at 20:50

## 2025-03-15 RX ADMIN — EPOETIN ALFA-EPBX 10000 UNITS: 10000 INJECTION, SOLUTION INTRAVENOUS; SUBCUTANEOUS at 00:32

## 2025-03-15 RX ADMIN — SODIUM CHLORIDE, PRESERVATIVE FREE 10 ML: 5 INJECTION INTRAVENOUS at 20:58

## 2025-03-15 RX ADMIN — HYDROMORPHONE HYDROCHLORIDE 0.2 MG: 1 INJECTION, SOLUTION INTRAMUSCULAR; INTRAVENOUS; SUBCUTANEOUS at 17:10

## 2025-03-15 RX ADMIN — INSULIN GLARGINE 8 UNITS: 100 INJECTION, SOLUTION SUBCUTANEOUS at 20:55

## 2025-03-15 RX ADMIN — SODIUM CHLORIDE, PRESERVATIVE FREE 10 ML: 5 INJECTION INTRAVENOUS at 11:11

## 2025-03-15 RX ADMIN — INSULIN LISPRO 1 UNITS: 100 INJECTION, SOLUTION INTRAVENOUS; SUBCUTANEOUS at 17:13

## 2025-03-15 RX ADMIN — DIPHENHYDRAMINE HCL 25 MG: 25 TABLET ORAL at 20:45

## 2025-03-15 RX ADMIN — Medication 20 MILLICURIE: at 09:30

## 2025-03-15 RX ADMIN — HYDROMORPHONE HYDROCHLORIDE 0.2 MG: 1 INJECTION, SOLUTION INTRAMUSCULAR; INTRAVENOUS; SUBCUTANEOUS at 20:44

## 2025-03-15 RX ADMIN — INSULIN LISPRO 4 UNITS: 100 INJECTION, SOLUTION INTRAVENOUS; SUBCUTANEOUS at 06:17

## 2025-03-15 ASSESSMENT — PAIN SCALES - GENERAL
PAINLEVEL_OUTOF10: 9
PAINLEVEL_OUTOF10: 0
PAINLEVEL_OUTOF10: 8

## 2025-03-15 ASSESSMENT — PAIN DESCRIPTION - ORIENTATION
ORIENTATION: LEFT

## 2025-03-15 ASSESSMENT — PAIN DESCRIPTION - LOCATION
LOCATION: FOOT;LEG
LOCATION: LEG
LOCATION: ANKLE

## 2025-03-15 ASSESSMENT — PAIN DESCRIPTION - DESCRIPTORS: DESCRIPTORS: ACHING

## 2025-03-15 NOTE — PROGRESS NOTES
Spiritual Health History and Assessment/Progress Note  [facility]    Attempted Encounter,  ,  ,      Name: Michelle Nettles MRN: [id number]    Age: 39 y.o.     Sex: female   Language: English   Mormon: Confucianist   Anemia     Date: 3/15/2025                           [facility]        Referral/Consult From: Rounding   Encounter Overview/Reason: Attempted Encounter  Service Provided For: Patient not available    Jillian, Belief, Meaning:   Patient unable to assess at this time  Family/Friends No family/friends present      Importance and Influence:  Patient unable to assess at this time  Family/Friends No family/friends present    Community:  Patient Unable to assess.  Patient was unavailable.  prayed a silent prayer for the patient and left devotional material and information about  services.  Family/Friends No family/friends present    Assessment and Plan of Care:     Patient Interventions include: Unable to assess.  Patient was unavailable.  prayed a silent prayer for the patient and left devotional material and information about  services.  Family/Friends Interventions include: No family/friends present    Patient Plan of Care: Spiritual Care available upon further referral  Family/Friends Plan of Care: No family/friends present    Electronically signed by Chaplain Juan David on 3/15/2025 at 2:09 PM

## 2025-03-15 NOTE — PROGRESS NOTES
Notified Dr. Perera of repeat potassium of 4.8.  Also, notified Dr. Beltrán of current H&H of 8.4/24.6.

## 2025-03-15 NOTE — FLOWSHEET NOTE
Pt completed 3.5 hrs of HD on a 3K bath with 2L of UF removed safely.   03/15/25 1550   Vital Signs   BP (!) 140/77   Temp 97.2 °F (36.2 °C)   Pulse (!) 105   Respirations 18   Weight - Scale 66 kg (145 lb 8.1 oz)   Weight Method Bed scale   Percent Weight Change -3   Pain Assessment   Pain Assessment None - Denies Pain   Pain Level 0   Post-Hemodialysis Assessment   Post-Treatment Procedures Blood returned;Access bleeding time < 10 minutes   Machine Disinfection Process Acid/Vinegar Clean;Heat Disinfect;Exterior Machine Disinfection   Rinseback Volume (ml) 300 ml   Blood Volume Processed (Liters) 75.1 L   Dialyzer Clearance Moderately streaked   Duration of Treatment (minutes) 240 minutes   Hemodialysis Intake (ml) 300 ml   Hemodialysis Output (ml) 2300 ml   NET Removed (ml) 2000   Tolerated Treatment Good   Patient Response to Treatment tolerated well; blood returned; needles pulled; stasis achieved; post report to Tayla RODRIGUEZ; Dr. Perera rounded on tx   Edema Facial   Time Off 1552   Patient Disposition Return to room   Observations & Evaluations   Level of Consciousness 0   Oriented X 3   Heart Rhythm Regular   Respiratory Quality/Effort Unlabored   O2 Device Nasal cannula   Skin Condition/Temp Dry;Warm   Abdomen Inspection Soft   Bowel Sounds (All Quadrants) Active

## 2025-03-15 NOTE — PLAN OF CARE
Problem: Chronic Conditions and Co-morbidities  Goal: Patient's chronic conditions and co-morbidity symptoms are monitored and maintained or improved  3/14/2025 2245 by Maritza Segura RN  Outcome: Progressing  3/14/2025 1858 by Beharry, Phyllis, RN  Outcome: Progressing     Problem: Discharge Planning  Goal: Discharge to home or other facility with appropriate resources  3/14/2025 2245 by Maritza Segura RN  Outcome: Progressing  3/14/2025 1858 by Beharry, Phyllis, RN  Outcome: Progressing     Problem: Pain  Goal: Verbalizes/displays adequate comfort level or baseline comfort level  3/14/2025 2245 by Maritza Segura RN  Outcome: Progressing  3/14/2025 1858 by Beharry, Phyllis, RN  Outcome: Progressing     Problem: Safety - Adult  Goal: Free from fall injury  3/14/2025 2245 by Maritza Segura RN  Outcome: Progressing  3/14/2025 1858 by Beharry, Phyllis, RN  Outcome: Progressing

## 2025-03-15 NOTE — PROGRESS NOTES
The nurse practitioner was notified about positive blood occult stool. Patient had a medium sized formed, dark bowel movement.

## 2025-03-15 NOTE — PROGRESS NOTES
Hospitalist Progress Note    Patient:  Michelle Nettles      Unit/Bed:0728/0728-A    YOB: 1985    MRN: [id number]       Acct: [id number]     PCP: Rogers Rodríguez MD    Date of Admission: 3/13/2025      Subjective: Patient seen and examined.  Patient reports having a large bowel movement that was noted to have blood in it.  Patient states that she has been frustrated as she has had an EGD and colonoscopy already in the past looking for a bleed.  Patient notes that nothing has been found.  Patient states that she was told that may be a PillCam would be needed in order to assess where she may be bleeding from.  Patient is willing to have colonoscopy done here.      Medications:  Reviewed    Infusion Medications    sodium chloride      sodium chloride      dextrose      sodium chloride      sodium chloride      sodium chloride      sodium chloride       Scheduled Medications    [START ON 3/16/2025] pantoprazole  40 mg Oral BID AC    carvedilol  25 mg Oral QAM    insulin glargine  8 Units SubCUTAneous Nightly    insulin lispro  3 Units SubCUTAneous TID WC    sodium chloride flush  5-40 mL IntraVENous 2 times per day    insulin lispro  0-4 Units SubCUTAneous 4x Daily AC & HS    epoetin nadya-epbx  10,000 Units SubCUTAneous Once per day on Monday Wednesday Friday     PRN Meds: sodium chloride, albuterol, bisacodyl, diphenhydrAMINE, sodium chloride flush, sodium chloride, ondansetron **OR** ondansetron, melatonin, acetaminophen **OR** acetaminophen, nicotine polacrilex, glucose, dextrose bolus **OR** dextrose bolus, glucagon (rDNA), dextrose, sodium chloride, EPINEPHrine PF, sodium chloride, traMADol, HYDROmorphone, sodium chloride, sodium chloride      Intake/Output Summary (Last 24 hours) at 3/15/2025 1938  Last data filed at 3/15/2025 1550  Gross per 24 hour   Intake 2230.33 ml   Output 2300 ml   Net -69.67 ml       Diet:  ADULT DIET; Regular; Low Potassium (Less than 3000 mg/day)    Exam:  BP  (!) 143/75   Pulse (!) 108   Temp 98.1 °F (36.7 °C) (Oral)   Resp 16   Ht 1.499 m (4' 11\")   Wt 66 kg (145 lb 8.1 oz)   LMP  (LMP Unknown)   SpO2 100%   BMI 29.39 kg/m²   Physical Exam  Constitutional:       General: She is not in acute distress.     Appearance: Normal appearance. She is not ill-appearing.   HENT:      Head: Normocephalic and atraumatic.      Right Ear: External ear normal.      Left Ear: External ear normal.      Nose: Nose normal.      Mouth/Throat:      Pharynx: Oropharynx is clear.   Eyes:      Extraocular Movements: Extraocular movements intact.      Pupils: Pupils are equal, round, and reactive to light.   Cardiovascular:      Rate and Rhythm: Normal rate and regular rhythm.      Pulses: Normal pulses.      Heart sounds: Normal heart sounds. No murmur heard.     No friction rub. No gallop.   Pulmonary:      Effort: Pulmonary effort is normal. No respiratory distress.      Breath sounds: Normal breath sounds. No wheezing, rhonchi or rales.   Abdominal:      General: Bowel sounds are normal. There is no distension.      Tenderness: There is no abdominal tenderness.   Musculoskeletal:         General: No swelling. Normal range of motion.      Cervical back: Normal range of motion and neck supple.   Skin:     General: Skin is warm.      Capillary Refill: Capillary refill takes less than 2 seconds.   Neurological:      General: No focal deficit present.      Mental Status: She is alert.         Labs:   Recent Labs     03/13/25 2045 03/14/25  0230 03/15/25  0227 03/15/25  0320 03/15/25  0742   WBC 5.3  --   --  5.4  --    HGB 5.8*   < > 6.3* 6.4* 8.4*   HCT 17.2*   < > 18.5* 18.7* 24.6*     --   --  201  --     < > = values in this interval not displayed.     Recent Labs     03/13/25 2045 03/14/25  0658 03/15/25  0320 03/15/25  0742   * 133 132  --    K 5.0 4.9 5.7* 4.8   CL 90* 93* 93*  --    CO2 24 25 24  --    BUN 61* 67* 89*  --    CREATININE 8.9* 9.3* 11.0*  --    CALCIUM

## 2025-03-15 NOTE — PROGRESS NOTES
General Surgery Progress Note    Patient down at [facility] for bleeding scan when I attempted to see her x2. Reportedly no issues overnight. Hemoccult did come back positive. Albeit, she is on iron so the accuracy is not 100%. Will see what the bleeding scan shows. May end up needing endoscopy this coming week.    Chris Pearson DO  10:32 AM

## 2025-03-15 NOTE — PROGRESS NOTES
Consult Note      Reason for Consult: ESRD needing dialysis  Date of Service: 3/15/2025   Chief Complaint: had concerns including Low Hgb (Labs drawn this morning at [facility]. Hgb 5.8. Recent discharge from [facility] for vomiting/diarrhea. ).        HISTORY OF PRESENT ILLNESS from the 3/14/25 note:     Michelle Nettles is a 39 y.o. female with a past medical history of anemia of stage V CKD, recent left trimalleolar fracture, ESRD, poorly controlled IDDM, chronic HFpEF, & hidradenitis suppurativa who presented to the ED from [facility] for fatigue with low Hgb.  Patient recently hospitalized at [place] after presenting with N/V/D on 3/3/2024.  Hgb was 5.2.  S/p repeat EGD showed gastritis & Schatzki's ring but no source of bleeding.  S/p explantation of left ankle hardware.  Patient is frustrated she had to return to the hospital once again.  She is conversationally dyspneic.  Over the past 2 days her face has been swelling up.  Was called to the bedside out of concern for transfusion reaction.  Held transfusion for now.  Gave Benadryl, Pepcid and 1 dose of IV Solu-Medrol. .     INTERVAL HX:    3/15/25: Pt awake alert and appropriate, seen in [facility] this PM. She states she had the bleeding scan and slept through the test. She notes this AM when she had a BM she did see blood in the stool. She has no abd pain currently and her appetite is good    Past Medical History:   Past Medical History:   Diagnosis Date    JOLLY (acute kidney injury) 04/05/2016    Anemia     AVF (arteriovenous fistula) 02/19/2018    let arm    Cellulitis, face     CHF (congestive heart failure) ([facility])     Chronic kidney disease     Chronic respiratory failure with hypoxia ([facility])     Dialysis AV fistula malfunction, initial encounter 11/07/2019    Displaced trimalleolar fracture of left lower leg, closed, initial encounter 02/22/2025    DM type 1 (diabetes mellitus, type 1) ([facility])     Encounter regarding  KIT) LIANNE, Check BP daily  Misc. Devices (PULSE OXIMETER FOR FINGER) MISC, Check HR and pulse ox daily and PRN symptoms  Accu-Chek Softclix Lancets MISC, 1 each by Does not apply route 4 times daily  Glucagon, rDNA, (GLUCAGON EMERGENCY) 1 MG KIT, Inject 1 mg as directed as needed (for blood glucose level <50)  calcium acetate (PHOSLO) 667 MG CAPS capsule, Take 3 capsules by mouth 3 times daily (with meals) (Patient taking differently: Take 1 capsule by mouth 3 times daily (with meals))  Continuous Glucose Transmitter (DEXCOM G6 TRANSMITTER) MISC, Use with sensors  Blood Glucose Monitoring Suppl (ACCU-CHEK GUIDE) w/Device KIT, Use to test blood sugars  blood glucose test strips (ACCU-CHEK GUIDE) strip, Use to test blood sugar up to 4 times daily as needed.  Insulin Pen Needle 32G X 4 MM MISC, 1 each by Does not apply route 4 times daily  Alcohol Swabs (ALCOHOL PREP) PADS, Use as needed  sodium chloride (OCEAN, BABY AYR) 0.65 % nasal spray, 1 spray by Nasal route every 2 hours as needed for Congestion  albuterol (PROVENTIL) (5 MG/ML) 0.5% nebulizer solution, Take 1 mL by nebulization 4 times daily as needed for Wheezing    Allergies:   Vancomycin    Social History:    reports that she has been smoking cigarettes. She started smoking about 25 years ago. She has a 12.9 pack-year smoking history. She has been exposed to tobacco smoke. She has never used smokeless tobacco. She reports that she does not currently use alcohol. She reports that she does not currently use drugs after having used the following drugs: Marijuana (Weed).    Family History:   family history includes Cancer in her father; Diabetes in her paternal aunt; Stroke in her mother.    REVIEW OF SYSTEMS  Minimum of 12 system ROS was performed as able. Pertinent positives are noted.      PHYSICAL EXAM:    Vitals:  BP (!) 146/65   Pulse 99   Temp 98.1 °F (36.7 °C) (Oral)   Resp 20   Ht 1.499 m (4' 11\")   Wt 68 kg (150 lb)   LMP  (LMP Unknown)   SpO2

## 2025-03-15 NOTE — PROGRESS NOTES
Subjective:    Patient was off the floor. Getting retroperitoneal US. Nurse said that she did well overnight. She has no new complaint.    Objective:    /65   Pulse 86   Temp 99 °F (37.2 °C) (Oral)   Resp 14   Ht 1.499 m (4' 11\")   Wt 68 kg (150 lb)   LMP  (LMP Unknown)   SpO2 97%   BMI 30.30 kg/m²         CBC with Differential:    Lab Results   Component Value Date/Time    WBC 5.4 03/15/2025 03:20 AM    RBC 2.16 03/15/2025 03:20 AM    RBC 2.92 02/22/2022 08:11 PM    HGB 8.4 03/15/2025 07:42 AM    HCT 24.6 03/15/2025 07:42 AM     03/15/2025 03:20 AM    MCV 86.6 03/15/2025 03:20 AM    MCH 29.6 03/15/2025 03:20 AM    MCHC 34.2 03/15/2025 03:20 AM    RDW 13.6 03/15/2025 03:20 AM    LYMPHOPCT 19 03/15/2025 03:20 AM    MONOPCT 12 03/15/2025 03:20 AM    MYELOPCT 0.9 03/24/2023 05:15 AM    EOSPCT 3 03/15/2025 03:20 AM    BASOPCT 0 03/15/2025 03:20 AM    MONOSABS 0.66 03/15/2025 03:20 AM    LYMPHSABS 1.05 03/15/2025 03:20 AM    EOSABS 0.15 03/15/2025 03:20 AM    BASOSABS 0.02 03/15/2025 03:20 AM     CMP:    Lab Results   Component Value Date/Time     03/15/2025 03:20 AM    K 4.8 03/15/2025 07:42 AM    K 5.2 06/30/2023 05:52 AM    CL 93 03/15/2025 03:20 AM    CO2 24 03/15/2025 03:20 AM    BUN 89 03/15/2025 03:20 AM    CREATININE 11.0 03/15/2025 03:20 AM    GFRAA 5 07/29/2022 01:14 AM    LABGLOM 4 03/15/2025 03:20 AM    LABGLOM 8 04/02/2024 11:30 AM    GLUCOSE 375 03/15/2025 03:20 AM    GLUCOSE 279 07/27/2011 04:40 PM    CALCIUM 9.1 03/15/2025 03:20 AM    BILITOT 0.3 03/13/2025 08:45 PM    ALKPHOS 102 03/13/2025 08:45 PM    AST 12 03/13/2025 08:45 PM    ALT <5 03/13/2025 08:45 PM               Current Facility-Administered Medications:     0.9 % sodium chloride infusion, , IntraVENous, PRN, Emily Arriola, APRN - CNP    albuterol (PROVENTIL) (2.5 MG/3ML) 0.083% nebulizer solution 5 mg, 5 mg, Nebulization, 4x Daily PRN, Jorge Feliz DO    bisacodyl (DULCOLAX) suppository 10 mg, 10 mg, Rectal,  0.3 mg, IntraMUSCular, PRN, Jorge Feliz, DO    0.9 % sodium chloride infusion, , IntraVENous, PRN, Nathan Beltrán MD    traMADol (ULTRAM) tablet 50 mg, 50 mg, Oral, Q6H PRN, Nathan Beltrán MD, 50 mg at 03/14/25 1159    HYDROmorphone (DILAUDID) injection 0.2 mg, 0.2 mg, IntraVENous, Q4H PRN, Nathan Beltrán MD, 0.2 mg at 03/14/25 2019    epoetin nadya-epbx (RETACRIT) injection 10,000 Units, 10,000 Units, SubCUTAneous, Once per day on Monday Wednesday Friday, Elias Draper MD, 10,000 Units at 03/15/25 0032    0.9 % sodium chloride infusion, , IntraVENous, PRN, Nathan Beltrán MD    0.9 % sodium chloride infusion, , IntraVENous, PRN, Glenys Nolen, DO    Assessment:    Principal Problem:    Anemia  Active Problems:    Chronic systolic (congestive) heart failure    Hidradenitis suppurativa    History of venous thromboembolism    MRSA colonization    ESRD needing dialysis (HCC)    Chronic hypoxic respiratory failure, on home oxygen therapy (HCC)    Poorly controlled type 2 diabetes mellitus (HCC)    Acute superficial gastritis without hemorrhage    Schatzki's ring of distal esophagus    Presence of artificial lower extremity  Resolved Problems:    * No resolved hospital problems. *    39 female with ESRD on HD, IDDM, recent surgical repair of left trimalleolar fracture admitted with anemia.     Stools hemoccult test: pos    Plan:    -anemia, likely multifactorial ESRD, GI blood loss, iron deficiency  May need GI evaluation with a colonoscopy. Surgery is following  Keep Hb > 7.5g/dl. transfuse as needed  VALENTIN and iron infusions per nephrology  Continue current supportive care.             Electronically signed by Henri Thibodeaux MD on 3/15/2025 at 10:07 AM

## 2025-03-16 PROBLEM — D64.9 SEVERE ANEMIA: Status: ACTIVE | Noted: 2025-03-16

## 2025-03-16 LAB
ABO/RH: NORMAL
ANION GAP SERPL CALCULATED.3IONS-SCNC: 12 MMOL/L (ref 7–16)
ANTIBODY SCREEN: NEGATIVE
ARM BAND NUMBER: NORMAL
BASOPHILS # BLD: 0.02 K/UL (ref 0–0.2)
BASOPHILS NFR BLD: 0 % (ref 0–2)
BLOOD BANK BLOOD PRODUCT EXPIRATION DATE: NORMAL
BLOOD BANK DISPENSE STATUS: NORMAL
BLOOD BANK ISBT PRODUCT BLOOD TYPE: 5100
BLOOD BANK PRODUCT CODE: NORMAL
BLOOD BANK SAMPLE EXPIRATION: NORMAL
BLOOD BANK UNIT TYPE AND RH: NORMAL
BPU ID: NORMAL
BUN SERPL-MCNC: 41 MG/DL (ref 6–20)
CALCIUM SERPL-MCNC: 9.2 MG/DL (ref 8.6–10.2)
CHLORIDE SERPL-SCNC: 103 MMOL/L (ref 98–107)
CO2 SERPL-SCNC: 27 MMOL/L (ref 22–29)
COMPONENT: NORMAL
CREAT SERPL-MCNC: 7 MG/DL (ref 0.5–1)
CROSSMATCH RESULT: NORMAL
EOSINOPHIL # BLD: 0.37 K/UL (ref 0.05–0.5)
EOSINOPHILS RELATIVE PERCENT: 7 % (ref 0–6)
ERYTHROCYTE [DISTWIDTH] IN BLOOD BY AUTOMATED COUNT: 13.7 % (ref 11.5–15)
GFR, ESTIMATED: 7 ML/MIN/1.73M2
GLUCOSE BLD-MCNC: 130 MG/DL (ref 74–99)
GLUCOSE BLD-MCNC: 134 MG/DL (ref 74–99)
GLUCOSE BLD-MCNC: 303 MG/DL (ref 74–99)
GLUCOSE BLD-MCNC: 322 MG/DL (ref 74–99)
GLUCOSE BLD-MCNC: 54 MG/DL (ref 74–99)
GLUCOSE BLD-MCNC: 73 MG/DL (ref 74–99)
GLUCOSE SERPL-MCNC: 76 MG/DL (ref 74–99)
HCT VFR BLD AUTO: 23.7 % (ref 34–48)
HGB BLD-MCNC: 8 G/DL (ref 11.5–15.5)
IMM GRANULOCYTES # BLD AUTO: 0.03 K/UL (ref 0–0.58)
IMM GRANULOCYTES NFR BLD: 1 % (ref 0–5)
LYMPHOCYTES NFR BLD: 1.02 K/UL (ref 1.5–4)
LYMPHOCYTES RELATIVE PERCENT: 19 % (ref 20–42)
MCH RBC QN AUTO: 29.7 PG (ref 26–35)
MCHC RBC AUTO-ENTMCNC: 33.8 G/DL (ref 32–34.5)
MCV RBC AUTO: 88.1 FL (ref 80–99.9)
MONOCYTES NFR BLD: 0.73 K/UL (ref 0.1–0.95)
MONOCYTES NFR BLD: 13 % (ref 2–12)
NEUTROPHILS NFR BLD: 60 % (ref 43–80)
NEUTS SEG NFR BLD: 3.27 K/UL (ref 1.8–7.3)
PLATELET # BLD AUTO: 197 K/UL (ref 130–450)
PMV BLD AUTO: 9.5 FL (ref 7–12)
POTASSIUM SERPL-SCNC: 4.2 MMOL/L (ref 3.5–5)
RBC # BLD AUTO: 2.69 M/UL (ref 3.5–5.5)
SODIUM SERPL-SCNC: 142 MMOL/L (ref 132–146)
TRANSFUSION STATUS: NORMAL
UNIT DIVISION: 0
UNIT ISSUE DATE/TIME: NORMAL
WBC OTHER # BLD: 5.4 K/UL (ref 4.5–11.5)

## 2025-03-16 PROCEDURE — 2700000000 HC OXYGEN THERAPY PER DAY

## 2025-03-16 PROCEDURE — 30233N1 TRANSFUSION OF NONAUTOLOGOUS RED BLOOD CELLS INTO PERIPHERAL VEIN, PERCUTANEOUS APPROACH: ICD-10-PCS | Performed by: HOSPITALIST

## 2025-03-16 PROCEDURE — 36415 COLL VENOUS BLD VENIPUNCTURE: CPT

## 2025-03-16 PROCEDURE — 1200000000 HC SEMI PRIVATE

## 2025-03-16 PROCEDURE — 80048 BASIC METABOLIC PNL TOTAL CA: CPT

## 2025-03-16 PROCEDURE — 6370000000 HC RX 637 (ALT 250 FOR IP): Performed by: HOSPITALIST

## 2025-03-16 PROCEDURE — 99232 SBSQ HOSP IP/OBS MODERATE 35: CPT | Performed by: SURGERY

## 2025-03-16 PROCEDURE — 96376 TX/PRO/DX INJ SAME DRUG ADON: CPT

## 2025-03-16 PROCEDURE — G0378 HOSPITAL OBSERVATION PER HR: HCPCS

## 2025-03-16 PROCEDURE — 85025 COMPLETE CBC W/AUTO DIFF WBC: CPT

## 2025-03-16 PROCEDURE — 6360000002 HC RX W HCPCS: Performed by: INTERNAL MEDICINE

## 2025-03-16 PROCEDURE — 6360000002 HC RX W HCPCS: Performed by: HOSPITALIST

## 2025-03-16 PROCEDURE — 6370000000 HC RX 637 (ALT 250 FOR IP): Performed by: SURGERY

## 2025-03-16 PROCEDURE — 82962 GLUCOSE BLOOD TEST: CPT

## 2025-03-16 PROCEDURE — 2500000003 HC RX 250 WO HCPCS: Performed by: HOSPITALIST

## 2025-03-16 PROCEDURE — 99232 SBSQ HOSP IP/OBS MODERATE 35: CPT | Performed by: HOSPITALIST

## 2025-03-16 RX ORDER — CALCIUM CARBONATE 500 MG/1
1000 TABLET, CHEWABLE ORAL 3 TIMES DAILY PRN
Status: DISCONTINUED | OUTPATIENT
Start: 2025-03-16 | End: 2025-03-25 | Stop reason: HOSPADM

## 2025-03-16 RX ADMIN — INSULIN GLARGINE 8 UNITS: 100 INJECTION, SOLUTION SUBCUTANEOUS at 21:46

## 2025-03-16 RX ADMIN — HYDROMORPHONE HYDROCHLORIDE 0.2 MG: 1 INJECTION, SOLUTION INTRAMUSCULAR; INTRAVENOUS; SUBCUTANEOUS at 23:30

## 2025-03-16 RX ADMIN — INSULIN LISPRO 3 UNITS: 100 INJECTION, SOLUTION INTRAVENOUS; SUBCUTANEOUS at 16:35

## 2025-03-16 RX ADMIN — INSULIN LISPRO 3 UNITS: 100 INJECTION, SOLUTION INTRAVENOUS; SUBCUTANEOUS at 08:51

## 2025-03-16 RX ADMIN — ONDANSETRON 4 MG: 2 INJECTION, SOLUTION INTRAMUSCULAR; INTRAVENOUS at 16:21

## 2025-03-16 RX ADMIN — DIPHENHYDRAMINE HCL 25 MG: 25 TABLET ORAL at 23:30

## 2025-03-16 RX ADMIN — SODIUM CHLORIDE, PRESERVATIVE FREE 10 ML: 5 INJECTION INTRAVENOUS at 22:12

## 2025-03-16 RX ADMIN — HYDROMORPHONE HYDROCHLORIDE 0.2 MG: 1 INJECTION, SOLUTION INTRAMUSCULAR; INTRAVENOUS; SUBCUTANEOUS at 18:32

## 2025-03-16 RX ADMIN — HYDROMORPHONE HYDROCHLORIDE 0.2 MG: 1 INJECTION, SOLUTION INTRAMUSCULAR; INTRAVENOUS; SUBCUTANEOUS at 14:35

## 2025-03-16 RX ADMIN — ONDANSETRON 4 MG: 2 INJECTION, SOLUTION INTRAMUSCULAR; INTRAVENOUS at 09:03

## 2025-03-16 RX ADMIN — CARVEDILOL 25 MG: 25 TABLET, FILM COATED ORAL at 08:47

## 2025-03-16 RX ADMIN — INSULIN LISPRO 3 UNITS: 100 INJECTION, SOLUTION INTRAVENOUS; SUBCUTANEOUS at 21:45

## 2025-03-16 RX ADMIN — CALCIUM CARBONATE 1000 MG: 500 TABLET, CHEWABLE ORAL at 19:03

## 2025-03-16 RX ADMIN — SODIUM CHLORIDE, PRESERVATIVE FREE 10 ML: 5 INJECTION INTRAVENOUS at 08:52

## 2025-03-16 RX ADMIN — HYDROMORPHONE HYDROCHLORIDE 0.2 MG: 1 INJECTION, SOLUTION INTRAMUSCULAR; INTRAVENOUS; SUBCUTANEOUS at 06:38

## 2025-03-16 RX ADMIN — PANTOPRAZOLE SODIUM 40 MG: 40 TABLET, DELAYED RELEASE ORAL at 06:37

## 2025-03-16 ASSESSMENT — PAIN - FUNCTIONAL ASSESSMENT: PAIN_FUNCTIONAL_ASSESSMENT: PREVENTS OR INTERFERES SOME ACTIVE ACTIVITIES AND ADLS

## 2025-03-16 ASSESSMENT — PAIN DESCRIPTION - LOCATION
LOCATION: ABDOMEN
LOCATION: LEG

## 2025-03-16 ASSESSMENT — PAIN SCALES - GENERAL
PAINLEVEL_OUTOF10: 5
PAINLEVEL_OUTOF10: 9
PAINLEVEL_OUTOF10: 9

## 2025-03-16 ASSESSMENT — PAIN DESCRIPTION - ORIENTATION
ORIENTATION: LEFT
ORIENTATION: MID
ORIENTATION: LEFT
ORIENTATION: RIGHT

## 2025-03-16 ASSESSMENT — PAIN DESCRIPTION - DESCRIPTORS
DESCRIPTORS: ACHING

## 2025-03-16 NOTE — PLAN OF CARE
Problem: Chronic Conditions and Co-morbidities  Goal: Patient's chronic conditions and co-morbidity symptoms are monitored and maintained or improved  3/16/2025 0920 by Mabel Stewart RN  Outcome: Progressing     Problem: Discharge Planning  Goal: Discharge to home or other facility with appropriate resources  3/16/2025 0920 by Mabel Stewart, RN  Outcome: Progressing     Problem: Pain  Goal: Verbalizes/displays adequate comfort level or baseline comfort level  3/16/2025 0920 by Mabel Stewart, RN  Outcome: Progressing     Problem: Safety - Adult  Goal: Free from fall injury  3/16/2025 0920 by Mabel Stewart, RN  Outcome: Progressing

## 2025-03-16 NOTE — PROGRESS NOTES
Consult Note      Reason for Consult: ESRD needing dialysis  Date of Service: 3/16/2025   Chief Complaint: had concerns including Low Hgb (Labs drawn this morning at [facility]. Hgb 5.8. Recent discharge from [facility] for vomiting/diarrhea. ).        HISTORY OF PRESENT ILLNESS from the 3/14/25 note:     Michelle Nettles is a 39 y.o. female with a past medical history of anemia of stage V CKD, recent left trimalleolar fracture, ESRD, poorly controlled IDDM, chronic HFpEF, & hidradenitis suppurativa who presented to the ED from [facility] for fatigue with low Hgb.  Patient recently hospitalized at [place] after presenting with N/V/D on 3/3/2024.  Hgb was 5.2.  S/p repeat EGD showed gastritis & Schatzki's ring but no source of bleeding.  S/p explantation of left ankle hardware.  Patient is frustrated she had to return to the hospital once again.  She is conversationally dyspneic.  Over the past 2 days her face has been swelling up.  Was called to the bedside out of concern for transfusion reaction.  Held transfusion for now.  Gave Benadryl, Pepcid and 1 dose of IV Solu-Medrol. .     INTERVAL HX:    3/16/25: Pt awake alert and appropriate. She notes she has abd pain in the LLQ and no appetite due to nausea    Past Medical History:   Past Medical History:   Diagnosis Date    JOLLY (acute kidney injury) 04/05/2016    Anemia     AVF (arteriovenous fistula) 02/19/2018    let arm    Cellulitis, face     CHF (congestive heart failure) (Formerly [facility])     Chronic kidney disease     Chronic respiratory failure with hypoxia ([facility]ly [facility])     Dialysis AV fistula malfunction, initial encounter 11/07/2019    Displaced trimalleolar fracture of left lower leg, closed, initial encounter 02/22/2025    DM type 1 (diabetes mellitus, type 1) ([facility]ly [facility])     Encounter regarding vascular access for dialysis for ESRD ([facility]ly [facility]) 07/12/2018    ESRD (end stage renal disease) (Formerly [facility])     Hemodialysis patient     [facility]

## 2025-03-16 NOTE — PROGRESS NOTES
Subjective:    The patient is awake and alert.  She received blood transfusion and dialysis yesterday.  She said that she noticed her stools are dark and smelly.  Thinks this is related to blood.  She has abdominal pain relatively mild mostly lower quadrants unchanged from prior.  No nausea or vomiting.  No constipation or diarrhea    Objective:    /60   Pulse 93   Temp 98.1 °F (36.7 °C) (Oral)   Resp 18   Ht 1.499 m (4' 11\")   Wt 66 kg (145 lb 8.1 oz)   LMP  (LMP Unknown)   SpO2 100%   BMI 29.39 kg/m²     General: NAD  HEENT: No thrush or mucositis, EOMI, PERRLA  Heart:  RRR, no murmurs  Lungs:  CTA bilaterally, no wheeze, rales or rhonchi  Abd: Tenderness on palpation mostly lower quadrants.  No significant rebound.  Extrem:  No clubbing, cyanosis.  Bilateral lower extremity edema  Lymphatics: No palpable adenopathy in cervical and supraclavicular regions  Skin: Intact, no petechia or purpura    CBC with Differential:    Lab Results   Component Value Date/Time    WBC 5.4 03/16/2025 10:19 AM    RBC 2.69 03/16/2025 10:19 AM    RBC 2.92 02/22/2022 08:11 PM    HGB 8.0 03/16/2025 10:19 AM    HCT 23.7 03/16/2025 10:19 AM     03/16/2025 10:19 AM    MCV 88.1 03/16/2025 10:19 AM    MCH 29.7 03/16/2025 10:19 AM    MCHC 33.8 03/16/2025 10:19 AM    RDW 13.7 03/16/2025 10:19 AM    LYMPHOPCT 19 03/16/2025 10:19 AM    MONOPCT 13 03/16/2025 10:19 AM    MYELOPCT 0.9 03/24/2023 05:15 AM    EOSPCT 7 03/16/2025 10:19 AM    BASOPCT 0 03/16/2025 10:19 AM    MONOSABS 0.73 03/16/2025 10:19 AM    LYMPHSABS 1.02 03/16/2025 10:19 AM    EOSABS 0.37 03/16/2025 10:19 AM    BASOSABS 0.02 03/16/2025 10:19 AM     CMP:    Lab Results   Component Value Date/Time     03/16/2025 10:19 AM    K 4.2 03/16/2025 10:19 AM    K 5.2 06/30/2023 05:52 AM     03/16/2025 10:19 AM    CO2 27 03/16/2025 10:19 AM    BUN 41 03/16/2025 10:19 AM    CREATININE 7.0 03/16/2025 10:19 AM    GFRAA 5 07/29/2022 01:14 AM    LABGLOM 7

## 2025-03-16 NOTE — PLAN OF CARE
Problem: Chronic Conditions and Co-morbidities  Goal: Patient's chronic conditions and co-morbidity symptoms are monitored and maintained or improved  3/15/2025 2347 by Rossy Ballard RN  Outcome: Progressing     Problem: Discharge Planning  Goal: Discharge to home or other facility with appropriate resources  3/15/2025 2347 by Rossy Ballard RN  Outcome: Progressing     Problem: Pain  Goal: Verbalizes/displays adequate comfort level or baseline comfort level  3/15/2025 2347 by Rossy Ballard RN  Outcome: Progressing     Problem: Safety - Adult  Goal: Free from fall injury  3/15/2025 2347 by Rossy Ballard RN  Outcome: Progressing

## 2025-03-16 NOTE — PROGRESS NOTES
Hospitalist Progress Note    Patient:  Michelle Nettles      Unit/Bed:0728/0728-A    YOB: 1985    MRN: [id number]       Acct: [id number]     PCP: Rogers Rodríguez MD    Date of Admission: 3/13/2025      Subjective: Patient seen and examined.  Patient reports that she is having severe acid reflux right now.  Patient states that it is making it difficult for her to eat.  Patient is requesting for the Protonix to be IV, as she feels that that has worked for her better.  Patient states that due to her stomach just feeling upset, she feels that she cannot eat at this time.  No fever, no chills.      Medications:  Reviewed    Infusion Medications    sodium chloride      sodium chloride      dextrose      sodium chloride      sodium chloride      sodium chloride      sodium chloride       Scheduled Medications    pantoprazole (PROTONIX) 40 mg in sodium chloride (PF) 0.9 % 10 mL injection  40 mg IntraVENous Daily    carvedilol  25 mg Oral QAM    insulin glargine  8 Units SubCUTAneous Nightly    insulin lispro  3 Units SubCUTAneous TID WC    sodium chloride flush  5-40 mL IntraVENous 2 times per day    insulin lispro  0-4 Units SubCUTAneous 4x Daily AC & HS    epoetin nadya-epbx  10,000 Units SubCUTAneous Once per day on Monday Wednesday Friday     PRN Meds: calcium carbonate, sodium chloride, albuterol, bisacodyl, diphenhydrAMINE, sodium chloride flush, sodium chloride, ondansetron **OR** ondansetron, melatonin, acetaminophen **OR** acetaminophen, nicotine polacrilex, glucose, dextrose bolus **OR** dextrose bolus, glucagon (rDNA), dextrose, sodium chloride, EPINEPHrine PF, sodium chloride, traMADol, HYDROmorphone, sodium chloride, sodium chloride      Intake/Output Summary (Last 24 hours) at 3/16/2025 1111  Last data filed at 3/15/2025 1550  Gross per 24 hour   Intake 300 ml   Output 2300 ml   Net -2000 ml       Diet:  ADULT DIET; Regular; Low Potassium (Less than 3000 mg/day)    Exam:  BP (!)  9.2     Recent Labs     03/13/25 2045   AST 12   ALT <5   BILITOT 0.3   ALKPHOS 102     No results for input(s): \"INR\" in the last 72 hours.  No results for input(s): \"CKTOTAL\", \"TROPONINI\" in the last 72 hours.    Urinalysis:      Lab Results   Component Value Date/Time    NITRU Negative 11/23/2017 10:05 PM    WBCUA 2-5 11/23/2017 10:05 PM    WBCUA NONE 11/21/2010 10:30 PM    BACTERIA RARE 11/23/2017 10:05 PM    RBCUA 5-10 11/23/2017 10:05 PM    RBCUA 0-1 09/29/2013 02:15 PM    BLOODU MODERATE 11/23/2017 10:05 PM    GLUCOSEU 500 11/23/2017 10:05 PM    GLUCOSEU >=1000 11/21/2010 10:30 PM       Radiology:  NM GI BLOOD LOSS   Final Result   No evidence of active GI bleeding during acquisition.         US RETROPERITONEAL COMPLETE   Final Result   1. No change in moderate bilateral atrophy with renal cortical thinning.   There is increased cortical echogenicity consistent with medical renal   disease.   2. Multiple simple cysts in the left kidney, unchanged compared to the   previous CT. These are of no clinical concern.   3. No sign of calculus or hydronephrosis in either kidney.   4. The urinary bladder is collapsed and is otherwise normal in appearance.         XR CHEST (2 VW)   Final Result      XR CHEST PORTABLE   Final Result   1. Limited exam, suspect opacification of right and possibly left side as   discussed above   2. Borderline cardiomegaly   3. Possible subglottic tracheal narrowing      RECOMMENDATION:   Clinical correlation.  PA/lateral chest x-rays or CT to further evaluate is   suggested.         [facility]    (Results Pending)       Diet: ADULT DIET; Regular; Low Potassium (Less than 3000 mg/day)    DVT prophylaxis: [] Lovenox                                 [x] SCDs                                 [] SQ Heparin                                 [] Encourage ambulation           [] Already on Anticoagulation     Disposition:    [x] Home       [] TCU       [] Rehab       [] Psych        [] SNF       [] Long Term Care Facility       [] Other-    Code Status: Full Code    PT/OT Eval Status: None    Assessment/Plan:    Anticipated Discharge in : 2 to 3 days    Active Hospital Problems    Diagnosis Date Noted    Chronic systolic (congestive) heart failure [I50.22] 11/30/2022     Priority: Medium    Severe anemia [D64.9] 03/16/2025    Presence of artificial lower extremity [Z97.10] 02/26/2025    Schatzki's ring of distal esophagus [K22.2] 01/16/2025    Acute superficial gastritis without hemorrhage [K29.00] 01/16/2025    Anemia [D64.9] 01/10/2025    Poorly controlled type 2 diabetes mellitus (HCC) [E11.65] 03/22/2024    Chronic hypoxic respiratory failure, on home oxygen therapy (HCC) [J96.11, Z99.81] 01/08/2024    ESRD needing dialysis (HCC) [N18.6, Z99.2] 07/11/2023    MRSA colonization [Z22.322] 03/23/2023    History of venous thromboembolism [Z86.718] 03/18/2022    Hidradenitis suppurativa [L73.2] 03/17/2020       Acute on chronic severe Symptomatic Anemia: POA.  BL around 7/8 g/dL purported history of iron deficiency anemia but repeat Fe studies over the past year not consistent with iron deficiency.  Receiving 2 units packed RBCs.  Follow-up H&H.  Patient states she is on iron and has has dark hard stools.  Denies any melena.    Consult hematology consult appreciated discussed case no evidence of intravascular hemolysis Occult GI bleed:?    S/p C-scope in 7/2024 with internal hemorrhoids.    Multiple EGDs.    Needs pill endoscopy.   General Surgery consult.  Recommend colonoscopy next week.  Possible colonoscopy on 3/18/2025  Follow-up Meckel's scan    Facial swelling: Etiology unclear, over the past 3 days.  She did undergo dialysis on Wednesday and they removed 1.5 L of ultrafiltrate.  Per nephrology patient's face always appear swollen.  GERD-patient reports that she feels as though she has acid reflux.  Patient's Protonix changed to IV on 3/16/2025 as patient states that she feels the IV works

## 2025-03-16 NOTE — PROGRESS NOTES
General Surgery Progress Note    Subjective:  Doesn't feel the best today. Having some intermittent LLQ abdominal pains. This is not new for her. Per nursing, had a melanotic stool with some clots in it yesterday.    Objective:  Vital signs reviewed  Gen: awake, alert, in NAD  HEENT: NCAT  Resp: non-labored breathing  CV: regular rate, distal pulses intact  Abd: soft, nondistended, mildly tender LLQ  Skin: warm, well perfused    Assessment/Plan:  Acute on chronic anemia  Concern for GI bleed    Has had colonoscopy and several EGDs within the past 6-8 months with no evidence of bleeding source  Nuc med bleeding scan yesterday did not show any active bleeding    Possible left sided colitis on CT abdomen/pelvis non-con on 3/2    Meckel's scan ordered to rule that out    Probable colonoscopy 3/18 pending clinical course, if negative will need outpatient capsule endoscopy    Chris Pearson DO  10:26 AM

## 2025-03-17 ENCOUNTER — PREP FOR PROCEDURE (OUTPATIENT)
Dept: SURGERY | Age: 40
End: 2025-03-17

## 2025-03-17 ENCOUNTER — APPOINTMENT (OUTPATIENT)
Dept: NUCLEAR MEDICINE | Age: 40
DRG: 811 | End: 2025-03-17
Payer: MEDICARE

## 2025-03-17 ENCOUNTER — ANESTHESIA EVENT (OUTPATIENT)
Dept: ENDOSCOPY | Age: 40
End: 2025-03-17
Payer: MEDICARE

## 2025-03-17 LAB
ANION GAP SERPL CALCULATED.3IONS-SCNC: 9 MMOL/L (ref 7–16)
BASOPHILS # BLD: 0.02 K/UL (ref 0–0.2)
BASOPHILS NFR BLD: 0 % (ref 0–2)
BUN SERPL-MCNC: 44 MG/DL (ref 6–20)
CALCIUM SERPL-MCNC: 9.2 MG/DL (ref 8.6–10.2)
CHLORIDE SERPL-SCNC: 101 MMOL/L (ref 98–107)
CO2 SERPL-SCNC: 27 MMOL/L (ref 22–29)
CREAT SERPL-MCNC: 9 MG/DL (ref 0.5–1)
EOSINOPHIL # BLD: 0.39 K/UL (ref 0.05–0.5)
EOSINOPHILS RELATIVE PERCENT: 7 % (ref 0–6)
ERYTHROCYTE [DISTWIDTH] IN BLOOD BY AUTOMATED COUNT: 13.7 % (ref 11.5–15)
GFR, ESTIMATED: 5 ML/MIN/1.73M2
GLUCOSE BLD-MCNC: 107 MG/DL (ref 74–99)
GLUCOSE BLD-MCNC: 110 MG/DL (ref 74–99)
GLUCOSE BLD-MCNC: 189 MG/DL (ref 74–99)
GLUCOSE BLD-MCNC: 260 MG/DL (ref 74–99)
GLUCOSE BLD-MCNC: 314 MG/DL (ref 74–99)
GLUCOSE BLD-MCNC: 63 MG/DL (ref 74–99)
GLUCOSE BLD-MCNC: 70 MG/DL (ref 74–99)
GLUCOSE SERPL-MCNC: 103 MG/DL (ref 74–99)
HCT VFR BLD AUTO: 22.9 % (ref 34–48)
HGB BLD-MCNC: 7.8 G/DL (ref 11.5–15.5)
IMM GRANULOCYTES # BLD AUTO: 0.03 K/UL (ref 0–0.58)
IMM GRANULOCYTES NFR BLD: 1 % (ref 0–5)
LYMPHOCYTES NFR BLD: 1.14 K/UL (ref 1.5–4)
LYMPHOCYTES RELATIVE PERCENT: 20 % (ref 20–42)
MAGNESIUM SERPL-MCNC: 2 MG/DL (ref 1.6–2.6)
MCH RBC QN AUTO: 30 PG (ref 26–35)
MCHC RBC AUTO-ENTMCNC: 34.1 G/DL (ref 32–34.5)
MCV RBC AUTO: 88.1 FL (ref 80–99.9)
MONOCYTES NFR BLD: 0.77 K/UL (ref 0.1–0.95)
MONOCYTES NFR BLD: 14 % (ref 2–12)
NEUTROPHILS NFR BLD: 58 % (ref 43–80)
NEUTS SEG NFR BLD: 3.23 K/UL (ref 1.8–7.3)
PATH REV BLD -IMP: NORMAL
PHOSPHATE SERPL-MCNC: 3.4 MG/DL (ref 2.5–4.5)
PLATELET # BLD AUTO: 183 K/UL (ref 130–450)
PMV BLD AUTO: 9.5 FL (ref 7–12)
POTASSIUM SERPL-SCNC: 4.7 MMOL/L (ref 3.5–5)
RBC # BLD AUTO: 2.6 M/UL (ref 3.5–5.5)
SODIUM SERPL-SCNC: 137 MMOL/L (ref 132–146)
WBC OTHER # BLD: 5.6 K/UL (ref 4.5–11.5)

## 2025-03-17 PROCEDURE — 6370000000 HC RX 637 (ALT 250 FOR IP): Performed by: HOSPITALIST

## 2025-03-17 PROCEDURE — 5A1D70Z PERFORMANCE OF URINARY FILTRATION, INTERMITTENT, LESS THAN 6 HOURS PER DAY: ICD-10-PCS | Performed by: HOSPITALIST

## 2025-03-17 PROCEDURE — 84100 ASSAY OF PHOSPHORUS: CPT

## 2025-03-17 PROCEDURE — 1200000000 HC SEMI PRIVATE

## 2025-03-17 PROCEDURE — 3430000000 HC RX DIAGNOSTIC RADIOPHARMACEUTICAL: Performed by: RADIOLOGY

## 2025-03-17 PROCEDURE — A9512 TC99M PERTECHNETATE: HCPCS | Performed by: RADIOLOGY

## 2025-03-17 PROCEDURE — 6360000002 HC RX W HCPCS: Performed by: HOSPITALIST

## 2025-03-17 PROCEDURE — 6360000002 HC RX W HCPCS: Performed by: INTERNAL MEDICINE

## 2025-03-17 PROCEDURE — 85025 COMPLETE CBC W/AUTO DIFF WBC: CPT

## 2025-03-17 PROCEDURE — 2700000000 HC OXYGEN THERAPY PER DAY

## 2025-03-17 PROCEDURE — 2500000003 HC RX 250 WO HCPCS: Performed by: HOSPITALIST

## 2025-03-17 PROCEDURE — 80048 BASIC METABOLIC PNL TOTAL CA: CPT

## 2025-03-17 PROCEDURE — 83735 ASSAY OF MAGNESIUM: CPT

## 2025-03-17 PROCEDURE — 90935 HEMODIALYSIS ONE EVALUATION: CPT

## 2025-03-17 PROCEDURE — 99232 SBSQ HOSP IP/OBS MODERATE 35: CPT | Performed by: STUDENT IN AN ORGANIZED HEALTH CARE EDUCATION/TRAINING PROGRAM

## 2025-03-17 PROCEDURE — 99232 SBSQ HOSP IP/OBS MODERATE 35: CPT | Performed by: SURGERY

## 2025-03-17 PROCEDURE — 82962 GLUCOSE BLOOD TEST: CPT

## 2025-03-17 PROCEDURE — 6370000000 HC RX 637 (ALT 250 FOR IP)

## 2025-03-17 PROCEDURE — 78290 INTESTINE IMAGING: CPT

## 2025-03-17 PROCEDURE — 6360000002 HC RX W HCPCS: Performed by: SURGERY

## 2025-03-17 PROCEDURE — 2580000003 HC RX 258: Performed by: SURGERY

## 2025-03-17 RX ADMIN — POLYETHYLENE GLYCOL-3350 AND ELECTROLYTES 4000 ML: 236; 6.74; 5.86; 2.97; 22.74 POWDER, FOR SOLUTION ORAL at 16:52

## 2025-03-17 RX ADMIN — SODIUM CHLORIDE, PRESERVATIVE FREE 40 MG: 5 INJECTION INTRAVENOUS at 14:35

## 2025-03-17 RX ADMIN — EPOETIN ALFA-EPBX 10000 UNITS: 10000 INJECTION, SOLUTION INTRAVENOUS; SUBCUTANEOUS at 19:40

## 2025-03-17 RX ADMIN — CARVEDILOL 25 MG: 25 TABLET, FILM COATED ORAL at 14:36

## 2025-03-17 RX ADMIN — GLUCAGON 1 MG: 1 INJECTION, POWDER, LYOPHILIZED, FOR SOLUTION INTRAMUSCULAR; INTRAVENOUS at 07:30

## 2025-03-17 RX ADMIN — HYDROMORPHONE HYDROCHLORIDE 0.2 MG: 1 INJECTION, SOLUTION INTRAMUSCULAR; INTRAVENOUS; SUBCUTANEOUS at 21:08

## 2025-03-17 RX ADMIN — HYDROMORPHONE HYDROCHLORIDE 0.2 MG: 1 INJECTION, SOLUTION INTRAMUSCULAR; INTRAVENOUS; SUBCUTANEOUS at 05:03

## 2025-03-17 RX ADMIN — DIPHENHYDRAMINE HCL 25 MG: 25 TABLET ORAL at 21:10

## 2025-03-17 RX ADMIN — HYDROMORPHONE HYDROCHLORIDE 0.2 MG: 1 INJECTION, SOLUTION INTRAMUSCULAR; INTRAVENOUS; SUBCUTANEOUS at 14:42

## 2025-03-17 RX ADMIN — SODIUM CHLORIDE, PRESERVATIVE FREE 10 ML: 5 INJECTION INTRAVENOUS at 14:36

## 2025-03-17 RX ADMIN — Medication 10 MILLICURIE: at 11:58

## 2025-03-17 RX ADMIN — SODIUM CHLORIDE, PRESERVATIVE FREE 10 ML: 5 INJECTION INTRAVENOUS at 21:17

## 2025-03-17 RX ADMIN — INSULIN LISPRO 2 UNITS: 100 INJECTION, SOLUTION INTRAVENOUS; SUBCUTANEOUS at 21:14

## 2025-03-17 RX ADMIN — INSULIN LISPRO 3 UNITS: 100 INJECTION, SOLUTION INTRAVENOUS; SUBCUTANEOUS at 18:11

## 2025-03-17 ASSESSMENT — PAIN DESCRIPTION - LOCATION
LOCATION: LEG
LOCATION: LEG;FOOT
LOCATION: LEG
LOCATION: LEG

## 2025-03-17 ASSESSMENT — PAIN DESCRIPTION - ORIENTATION
ORIENTATION: LEFT

## 2025-03-17 ASSESSMENT — PAIN DESCRIPTION - DESCRIPTORS
DESCRIPTORS: ACHING

## 2025-03-17 ASSESSMENT — PAIN - FUNCTIONAL ASSESSMENT
PAIN_FUNCTIONAL_ASSESSMENT: PREVENTS OR INTERFERES SOME ACTIVE ACTIVITIES AND ADLS
PAIN_FUNCTIONAL_ASSESSMENT: PREVENTS OR INTERFERES SOME ACTIVE ACTIVITIES AND ADLS

## 2025-03-17 ASSESSMENT — ENCOUNTER SYMPTOMS
DYSPNEA ACTIVITY LEVEL: NO INTERVAL CHANGE
SHORTNESS OF BREATH: 1

## 2025-03-17 ASSESSMENT — PAIN SCALES - GENERAL
PAINLEVEL_OUTOF10: 9
PAINLEVEL_OUTOF10: 8
PAINLEVEL_OUTOF10: 6
PAINLEVEL_OUTOF10: 7
PAINLEVEL_OUTOF10: 2

## 2025-03-17 ASSESSMENT — LIFESTYLE VARIABLES: SMOKING_STATUS: 1

## 2025-03-17 NOTE — FLOWSHEET NOTE
Pt completed 4 hrs of HD on a 3K bath with 2L of UF removed safely. Post report to Amaris RODRIGUEZ   03/17/25 1125   Vital Signs   BP (!) 180/78   Temp 98 °F (36.7 °C)   Pulse (!) 105   Respirations 18   Weight - Scale 64 kg (141 lb 1.5 oz)   Weight Method Bed scale   Percent Weight Change -3.03   Post-Hemodialysis Assessment   Post-Treatment Procedures Blood returned;Access bleeding time < 10 minutes   Machine Disinfection Process Acid/Vinegar Clean;Heat Disinfect;Exterior Machine Disinfection   Rinseback Volume (ml) 300 ml   Blood Volume Processed (Liters) 76.1 L   Dialyzer Clearance Moderately streaked   Duration of Treatment (minutes) 240 minutes   Hemodialysis Intake (ml) 300 ml   Hemodialysis Output (ml) 2300 ml   NET Removed (ml) 2000   Tolerated Treatment Good   Patient Response to Treatment tolerated well; blood returned; needles pulled; stasis achhieved; post report to Amaris RODRIGUEZ; to nuc med at this time   Edema Facial   Time Off 1112   Patient Disposition Transfer to radiology   Observations & Evaluations   Level of Consciousness 0   Oriented X 3   Heart Rhythm Regular   Respiratory Quality/Effort Unlabored   Skin Color Pink   Skin Condition/Temp Dry;Warm   Abdomen Inspection Soft   Bowel Sounds (All Quadrants) Active

## 2025-03-17 NOTE — ANESTHESIA PRE PROCEDURE
at 03/17/25 0730    dextrose 10 % infusion   IntraVENous Continuous PRN Jorge Feliz DO        0.9 % sodium chloride infusion   IntraVENous PRN Emily Arriola APRN - PEDRO        EPINEPHrine PF 1 MG/ML injection 0.3 mg  0.3 mg IntraMUSCular PRN Jorge Feliz DO        0.9 % sodium chloride infusion   IntraVENous PRN Nathan Beltrán MD        traMADol (ULTRAM) tablet 50 mg  50 mg Oral Q6H PRN Nathan Beltrán MD   50 mg at 03/14/25 1159    HYDROmorphone (DILAUDID) injection 0.2 mg  0.2 mg IntraVENous Q4H PRN Nathan Beltrán MD   0.2 mg at 03/17/25 1442    epoetin nadya-epbx (RETACRIT) injection 10,000 Units  10,000 Units SubCUTAneous Once per day on Monday Wednesday Friday Elias Draper MD   10,000 Units at 03/15/25 0032    0.9 % sodium chloride infusion   IntraVENous PRN Nathan Beltrán MD        0.9 % sodium chloride infusion   IntraVENous PRN Glenys Nolen DO           Allergies:    Allergies   Allergen Reactions    Vancomycin Shortness Of Breath and Itching       Problem List:    Patient Active Problem List   Diagnosis Code    Poorly controlled type 1 diabetes mellitus ([facility]) E10.65    Uncontrolled type 1 diabetes mellitus with hyperglycemia ([facility]) E10.65    Hyperkalemia E87.5    Nasal polyp J33.9    Periorbital cellulitis of right eye L03.213    Periorbital cellulitis L03.213    Vitamin B deficiency, unspecified E53.9    Nasal congestion R09.81    Hidradenitis suppurativa L73.2    Symptomatic anemia D64.9    Normocytic anemia due to blood loss D50.0    Acute decompensated heart failure (HCC) I50.9    Gall stone K80.20    Dietary noncompliance Z91.119    Metabolic encephalopathy G93.41    Hypoxia R09.02    Hypotension I95.9    History of venous thromboembolism Z86.718    Chronic systolic (congestive) heart failure I50.22    MRSA colonization Z22.322    ESRD on hemodialysis ([facility]) N18.6, Z99.2    SOB (shortness of breath) R06.02    ESRD needing dialysis ([facility]) N18.6, Z99.2    Chronic

## 2025-03-17 NOTE — CARE COORDINATION
Updated plan of care. Total 4 units transfused since admit. Nuclear bleeding scan today, possible colonoscopy tomorrow 3/18. GUILLERMINA Daugherty. Currently from [facility] and plan to return. Will need pre-cert. Will follow-mjo

## 2025-03-17 NOTE — PROGRESS NOTES
Subjective:    The patient is awake and alert.  HD today, Meckels scan negative  For colonoscopy tomorrow  Intermittent cramping/aching LUQ abdominal pain yesterday, resolved today and she has a good appetite but on liquids for colonoscopy prep  H/H trending down since last transfusion  No nausea or vomiting.  No constipation or diarrhea    Objective:    BP (!) 144/69   Pulse (!) 110   Temp 98 °F (36.7 °C) (Oral)   Resp 18   Ht 1.499 m (4' 11\")   Wt 64 kg (141 lb 1.5 oz)   LMP  (LMP Unknown)   SpO2 100%   BMI 28.50 kg/m²     General: NAD  HEENT: No thrush or mucositis, EOMI, PERRLA, facial fullness  Heart:  tachycardic, no murmurs  Lungs:  CTA bilaterally, no wheeze, rales or rhonchi  Abd: Tenderness on palpation mostly lower quadrants.  No significant rebound.  Extrem:  No clubbing, cyanosis.  Bilateral lower extremity edema  Lymphatics: No palpable adenopathy in cervical and supraclavicular regions  Skin: Intact, no petechia or purpura    CBC with Differential:    Lab Results   Component Value Date/Time    WBC 5.6 03/17/2025 07:38 AM    RBC 2.60 03/17/2025 07:38 AM    RBC 2.92 02/22/2022 08:11 PM    HGB 7.8 03/17/2025 07:38 AM    HCT 22.9 03/17/2025 07:38 AM     03/17/2025 07:38 AM    MCV 88.1 03/17/2025 07:38 AM    MCH 30.0 03/17/2025 07:38 AM    MCHC 34.1 03/17/2025 07:38 AM    RDW 13.7 03/17/2025 07:38 AM    LYMPHOPCT 20 03/17/2025 07:38 AM    MONOPCT 14 03/17/2025 07:38 AM    MYELOPCT 0.9 03/24/2023 05:15 AM    EOSPCT 7 03/17/2025 07:38 AM    BASOPCT 0 03/17/2025 07:38 AM    MONOSABS 0.77 03/17/2025 07:38 AM    LYMPHSABS 1.14 03/17/2025 07:38 AM    EOSABS 0.39 03/17/2025 07:38 AM    BASOSABS 0.02 03/17/2025 07:38 AM     CMP:    Lab Results   Component Value Date/Time     03/17/2025 07:38 AM    K 4.7 03/17/2025 07:38 AM    K 5.2 06/30/2023 05:52 AM     03/17/2025 07:38 AM    CO2 27 03/17/2025 07:38 AM    BUN 44 03/17/2025 07:38 AM    CREATININE 9.0 03/17/2025 07:38 AM    GFRAA 5

## 2025-03-17 NOTE — PROGRESS NOTES
Pt in Nuc Med at the time of my visit. She had IHD this AM and tolerated with 2L vol removal.    MELINDA Perera MD

## 2025-03-17 NOTE — PROGRESS NOTES
[facility] Quality Flow/Interdisciplinary Rounds Progress Note        Quality Flow Rounds held on March 17, 2025    Disciplines Attending:  Bedside Nurse, , , Nursing Unit Leadership, and      Michelle ORDONEZ Lencho Nettles was admitted on 3/13/2025  8:02 PM    Anticipated Discharge Date:   3/20/2025    Disposition: Home    Edgar Score:  Edgar Scale Score: 19    [facility] RISK OF UNPLANNED READMISSION 2.0             36.5 Total Score        Discussed patient goal for the day, patient clinical progression, and barriers to discharge.  The following Goal(s) of the Day/Commitment(s) have been identified:  Diet Progression  RN message out to Dr. Crystal: clarify need to change diet and order for prep for scope tomorrow       Nna Gupta RN  March 17, 2025

## 2025-03-17 NOTE — PROGRESS NOTES
GENERAL SURGERY  DAILY PROGRESS NOTE  3/17/2025    CHIEF COMPLAINT:  Chief Complaint   Patient presents with    Low Hgb     Labs drawn this morning at [facility]. Hgb 5.8. Recent discharge from [facility] for vomiting/diarrhea.        SUBJECTIVE:  Unsure if she's still having bloody BM's. Intermittently tachy to low 100s. Hgb 8.4 to 8. Received 3u prbc over weekend.     OBJECTIVE:  BP (!) 145/72   Pulse 100   Temp 98.2 °F (36.8 °C) (Oral)   Resp 16   Ht 1.499 m (4' 11\")   Wt 66 kg (145 lb 8.1 oz)   LMP  (LMP Unknown)   SpO2 100%   BMI 29.39 kg/m²     GENERAL:  NAD. A&Ox3.  LUNGS:  No increased work of breathing.  CARDIOVASCULAR: RR  ABDOMEN:  Soft, non-distended, non-tender. No guarding, rigidity, rebound.    ASSESSMENT/PLAN:  39 y.o. female w acute on chronic anemia, concern for GIB    Has had colonoscopy and several EGDs within the past 6-8 months with no evidence of bleeding source  Nuc med bleeding scan did not show any active bleeding  Will plan on meckel's scan today  Pending results, likely colonoscopy tmrw 3/18. Will prep and CLD today.     Dominic Chawla DO  Surgery Resident PGY-3  3/17/2025  5:43 AM         Attending Physician Statement:    Chief Complaint:   Chief Complaint   Patient presents with    Low Hgb     Labs drawn this morning at [facility]. Hgb 5.8. Recent discharge from [facility] for vomiting/diarrhea.        I have examined the patient and performed the key aspects of physical exam, reviewed the record (including all new notes, pertinent radiology images which are independently reviewed and laboratory findings), and discussed the case with the surgical team.  I agree with the assessment and plan with the following additions, corrections, and changes. 14pt review of symptoms completed and negative except as mentioned.    HgB stable, but slight drift. Had melena over weekend. Meckels scan today. If negative, plan for cscope.    Madhu Cordova MD  03/17/25  9:59 AM    NOTE: This

## 2025-03-17 NOTE — PROGRESS NOTES
[facility] Hospitalist Progress Note    Admitting Date and Time: 3/13/2025  8:02 PM  Admit Dx: Acute on chronic anemia [D64.9]  Severe anemia [D64.9]    Subjective:  Patient is being followed for Acute on chronic anemia [D64.9]  Severe anemia [D64.9]   Pt for HD this am.   Per RN: no major concerns.     ROS: denies fever, chills, cp, sob, n/v, HA unless stated above.      polyethylene glycol  4,000 mL Oral Once    pantoprazole (PROTONIX) 40 mg in sodium chloride (PF) 0.9 % 10 mL injection  40 mg IntraVENous Daily    carvedilol  25 mg Oral QAM    insulin glargine  8 Units SubCUTAneous Nightly    insulin lispro  3 Units SubCUTAneous TID WC    sodium chloride flush  5-40 mL IntraVENous 2 times per day    insulin lispro  0-4 Units SubCUTAneous 4x Daily AC & HS    epoetin nadya-epbx  10,000 Units SubCUTAneous Once per day on Monday Wednesday Friday     calcium carbonate, 1,000 mg, TID PRN  sodium chloride, , PRN  albuterol, 5 mg, 4x Daily PRN  bisacodyl, 10 mg, Daily PRN  diphenhydrAMINE, 25 mg, Q6H PRN  sodium chloride flush, 5-40 mL, PRN  sodium chloride, , PRN  ondansetron, 4 mg, Q8H PRN   Or  ondansetron, 4 mg, Q6H PRN  melatonin, 3 mg, Nightly PRN  acetaminophen, 650 mg, Q6H PRN   Or  acetaminophen, 650 mg, Q6H PRN  nicotine polacrilex, 2 mg, Q1H PRN  glucose, 4 tablet, PRN  dextrose bolus, 125 mL, PRN   Or  dextrose bolus, 250 mL, PRN  glucagon (rDNA), 1 mg, PRN  dextrose, , Continuous PRN  sodium chloride, , PRN  EPINEPHrine PF, 0.3 mg, PRN  sodium chloride, , PRN  traMADol, 50 mg, Q6H PRN  HYDROmorphone, 0.2 mg, Q4H PRN  sodium chloride, , PRN  sodium chloride, , PRN         Objective:    BP (!) 145/72   Pulse 100   Temp 98.2 °F (36.8 °C) (Oral)   Resp 16   Ht 1.499 m (4' 11\")   Wt 66 kg (145 lb 8.1 oz)   LMP  (LMP Unknown)   SpO2 100%   BMI 29.39 kg/m²     General Appearance: alert and oriented to person, place and time and in no acute distress  Skin: warm and dry  Head: normocephalic and  acquisition.     No evidence of active GI bleeding during acquisition.       Assessment:    Principal Problem:    Anemia  Active Problems:    Chronic systolic (congestive) heart failure    Hidradenitis suppurativa    History of venous thromboembolism    MRSA colonization    ESRD needing dialysis (HCC)    Chronic hypoxic respiratory failure, on home oxygen therapy (HCC)    Poorly controlled type 2 diabetes mellitus (HCC)    Acute superficial gastritis without hemorrhage    Schatzki's ring of distal esophagus    Presence of artificial lower extremity    Severe anemia  Resolved Problems:    * No resolved hospital problems. *      Plan:  Acute on chronic severe Symptomatic Anemia: POA.  BL around 7/8 g/dL  S/P 4   units packed RBCs.  Follow-up H&H.  Patient states she is on iron and has has dark hard stools.  Denies any melena.    Consult hematology consult appreciated discussed case no evidence of intravascular hemolysis Occult GI bleed:?    S/p C-scope in 7/2024 with internal hemorrhoids.    Multiple EGDs.    Needs pill endoscopy.   General Surgery consult.    Possible colonoscopy on 3/18/2025  Follow-up Meckel's scan    Facial swelling: Etiology unclear, over the past 3 days.  She did undergo dialysis today.  Per nephrology patient's face always appear swollen.  GERD-patient reports that she feels as though she has acid reflux.  Patient's Protonix changed to IV on 3/16/2025 as patient states that she feels the IV works better.  Tums also added to patient's regimen.  No Florina-[place] is available at our facility.  IDDM with hyperglycemia: Labile blood sugars.  Continue Lantus 8 units nightly.  3 units AC 3 times daily with sliding scale.  hypertension, resume home meds  ESRD on dialysis, nephrology consulted for dialysis needs  Chronic elevation of troponin in the setting of ESRD, denies chest pain, monitor  Hypoglycemia - on ISS, hold premeal & basal Insulin.  Nuclear bleeding scan today, possible colonoscopy tomorrow

## 2025-03-18 ENCOUNTER — ANESTHESIA (OUTPATIENT)
Dept: ENDOSCOPY | Age: 40
End: 2025-03-18
Payer: MEDICARE

## 2025-03-18 DIAGNOSIS — S82.852A TRIMALLEOLAR FRACTURE OF ANKLE, CLOSED, LEFT, INITIAL ENCOUNTER: Primary | ICD-10-CM

## 2025-03-18 LAB
ANION GAP SERPL CALCULATED.3IONS-SCNC: 12 MMOL/L (ref 7–16)
BASOPHILS # BLD: 0.04 K/UL (ref 0–0.2)
BASOPHILS NFR BLD: 1 % (ref 0–2)
BUN SERPL-MCNC: 18 MG/DL (ref 6–20)
CALCIUM SERPL-MCNC: 9.3 MG/DL (ref 8.6–10.2)
CHLORIDE SERPL-SCNC: 97 MMOL/L (ref 98–107)
CO2 SERPL-SCNC: 27 MMOL/L (ref 22–29)
CREAT SERPL-MCNC: 6.6 MG/DL (ref 0.5–1)
EOSINOPHIL # BLD: 0.32 K/UL (ref 0.05–0.5)
EOSINOPHILS RELATIVE PERCENT: 6 % (ref 0–6)
ERYTHROCYTE [DISTWIDTH] IN BLOOD BY AUTOMATED COUNT: 13.5 % (ref 11.5–15)
GFR, ESTIMATED: 8 ML/MIN/1.73M2
GLUCOSE BLD-MCNC: 189 MG/DL (ref 74–99)
GLUCOSE BLD-MCNC: 256 MG/DL (ref 74–99)
GLUCOSE BLD-MCNC: 260 MG/DL (ref 74–99)
GLUCOSE BLD-MCNC: 343 MG/DL (ref 74–99)
GLUCOSE SERPL-MCNC: 306 MG/DL (ref 74–99)
HCT VFR BLD AUTO: 25.3 % (ref 34–48)
HGB BLD-MCNC: 8.4 G/DL (ref 11.5–15.5)
IMM GRANULOCYTES # BLD AUTO: <0.03 K/UL (ref 0–0.58)
IMM GRANULOCYTES NFR BLD: 0 % (ref 0–5)
LYMPHOCYTES NFR BLD: 0.92 K/UL (ref 1.5–4)
LYMPHOCYTES RELATIVE PERCENT: 17 % (ref 20–42)
MAGNESIUM SERPL-MCNC: 2 MG/DL (ref 1.6–2.6)
MCH RBC QN AUTO: 29.6 PG (ref 26–35)
MCHC RBC AUTO-ENTMCNC: 33.2 G/DL (ref 32–34.5)
MCV RBC AUTO: 89.1 FL (ref 80–99.9)
MONOCYTES NFR BLD: 0.69 K/UL (ref 0.1–0.95)
MONOCYTES NFR BLD: 13 % (ref 2–12)
NEUTROPHILS NFR BLD: 63 % (ref 43–80)
NEUTS SEG NFR BLD: 3.32 K/UL (ref 1.8–7.3)
PHOSPHATE SERPL-MCNC: 2.3 MG/DL (ref 2.5–4.5)
PLATELET # BLD AUTO: 204 K/UL (ref 130–450)
PMV BLD AUTO: 9.5 FL (ref 7–12)
POTASSIUM SERPL-SCNC: 5.2 MMOL/L (ref 3.5–5)
RBC # BLD AUTO: 2.84 M/UL (ref 3.5–5.5)
SODIUM SERPL-SCNC: 136 MMOL/L (ref 132–146)
WBC OTHER # BLD: 5.3 K/UL (ref 4.5–11.5)

## 2025-03-18 PROCEDURE — 85025 COMPLETE CBC W/AUTO DIFF WBC: CPT

## 2025-03-18 PROCEDURE — 3700000001 HC ADD 15 MINUTES (ANESTHESIA): Performed by: SURGERY

## 2025-03-18 PROCEDURE — 2500000003 HC RX 250 WO HCPCS: Performed by: HOSPITALIST

## 2025-03-18 PROCEDURE — 1200000000 HC SEMI PRIVATE

## 2025-03-18 PROCEDURE — 80048 BASIC METABOLIC PNL TOTAL CA: CPT

## 2025-03-18 PROCEDURE — 0DJD8ZZ INSPECTION OF LOWER INTESTINAL TRACT, VIA NATURAL OR ARTIFICIAL OPENING ENDOSCOPIC: ICD-10-PCS | Performed by: SURGERY

## 2025-03-18 PROCEDURE — 6360000002 HC RX W HCPCS: Performed by: INTERNAL MEDICINE

## 2025-03-18 PROCEDURE — 82962 GLUCOSE BLOOD TEST: CPT

## 2025-03-18 PROCEDURE — 6360000002 HC RX W HCPCS: Performed by: SURGERY

## 2025-03-18 PROCEDURE — 83735 ASSAY OF MAGNESIUM: CPT

## 2025-03-18 PROCEDURE — 2580000003 HC RX 258: Performed by: SURGERY

## 2025-03-18 PROCEDURE — 6370000000 HC RX 637 (ALT 250 FOR IP): Performed by: HOSPITALIST

## 2025-03-18 PROCEDURE — 7100000011 HC PHASE II RECOVERY - ADDTL 15 MIN: Performed by: SURGERY

## 2025-03-18 PROCEDURE — 99232 SBSQ HOSP IP/OBS MODERATE 35: CPT | Performed by: STUDENT IN AN ORGANIZED HEALTH CARE EDUCATION/TRAINING PROGRAM

## 2025-03-18 PROCEDURE — 36415 COLL VENOUS BLD VENIPUNCTURE: CPT

## 2025-03-18 PROCEDURE — 7100000010 HC PHASE II RECOVERY - FIRST 15 MIN: Performed by: SURGERY

## 2025-03-18 PROCEDURE — 3700000000 HC ANESTHESIA ATTENDED CARE: Performed by: SURGERY

## 2025-03-18 PROCEDURE — 2700000000 HC OXYGEN THERAPY PER DAY

## 2025-03-18 PROCEDURE — 2580000003 HC RX 258: Performed by: NURSE PRACTITIONER

## 2025-03-18 PROCEDURE — 3609027000 HC COLONOSCOPY: Performed by: SURGERY

## 2025-03-18 PROCEDURE — 6370000000 HC RX 637 (ALT 250 FOR IP): Performed by: INTERNAL MEDICINE

## 2025-03-18 PROCEDURE — 84100 ASSAY OF PHOSPHORUS: CPT

## 2025-03-18 PROCEDURE — 6360000002 HC RX W HCPCS

## 2025-03-18 PROCEDURE — 2709999900 HC NON-CHARGEABLE SUPPLY: Performed by: SURGERY

## 2025-03-18 RX ORDER — LIDOCAINE HYDROCHLORIDE 20 MG/ML
INJECTION, SOLUTION EPIDURAL; INFILTRATION; INTRACAUDAL; PERINEURAL
Status: DISCONTINUED | OUTPATIENT
Start: 2025-03-18 | End: 2025-03-18 | Stop reason: SDUPTHER

## 2025-03-18 RX ORDER — PROPOFOL 10 MG/ML
INJECTION, EMULSION INTRAVENOUS
Status: DISCONTINUED | OUTPATIENT
Start: 2025-03-18 | End: 2025-03-18 | Stop reason: SDUPTHER

## 2025-03-18 RX ADMIN — HYDROMORPHONE HYDROCHLORIDE 0.2 MG: 1 INJECTION, SOLUTION INTRAMUSCULAR; INTRAVENOUS; SUBCUTANEOUS at 02:53

## 2025-03-18 RX ADMIN — SODIUM CHLORIDE, PRESERVATIVE FREE 40 MG: 5 INJECTION INTRAVENOUS at 09:14

## 2025-03-18 RX ADMIN — INSULIN LISPRO 2 UNITS: 100 INJECTION, SOLUTION INTRAVENOUS; SUBCUTANEOUS at 17:45

## 2025-03-18 RX ADMIN — CARVEDILOL 25 MG: 25 TABLET, FILM COATED ORAL at 09:14

## 2025-03-18 RX ADMIN — HYDROMORPHONE HYDROCHLORIDE 0.2 MG: 1 INJECTION, SOLUTION INTRAMUSCULAR; INTRAVENOUS; SUBCUTANEOUS at 19:47

## 2025-03-18 RX ADMIN — SODIUM CHLORIDE, PRESERVATIVE FREE 10 ML: 5 INJECTION INTRAVENOUS at 19:49

## 2025-03-18 RX ADMIN — PROPOFOL 270 MG: 10 INJECTION, EMULSION INTRAVENOUS at 07:49

## 2025-03-18 RX ADMIN — INSULIN LISPRO 1 UNITS: 100 INJECTION, SOLUTION INTRAVENOUS; SUBCUTANEOUS at 12:49

## 2025-03-18 RX ADMIN — INSULIN LISPRO 2 UNITS: 100 INJECTION, SOLUTION INTRAVENOUS; SUBCUTANEOUS at 05:52

## 2025-03-18 RX ADMIN — LIDOCAINE HYDROCHLORIDE 40 MG: 20 INJECTION, SOLUTION EPIDURAL; INFILTRATION; INTRACAUDAL; PERINEURAL at 07:49

## 2025-03-18 RX ADMIN — SODIUM CHLORIDE, PRESERVATIVE FREE 10 ML: 5 INJECTION INTRAVENOUS at 09:14

## 2025-03-18 RX ADMIN — INSULIN LISPRO 3 UNITS: 100 INJECTION, SOLUTION INTRAVENOUS; SUBCUTANEOUS at 19:54

## 2025-03-18 RX ADMIN — POTASSIUM & SODIUM PHOSPHATES POWDER PACK 280-160-250 MG 250 MG: 280-160-250 PACK at 17:45

## 2025-03-18 RX ADMIN — DIPHENHYDRAMINE HCL 25 MG: 25 TABLET ORAL at 19:47

## 2025-03-18 RX ADMIN — SODIUM CHLORIDE: 9 INJECTION, SOLUTION INTRAVENOUS at 07:35

## 2025-03-18 RX ADMIN — HYDROMORPHONE HYDROCHLORIDE 0.2 MG: 1 INJECTION, SOLUTION INTRAMUSCULAR; INTRAVENOUS; SUBCUTANEOUS at 09:27

## 2025-03-18 ASSESSMENT — PAIN DESCRIPTION - ORIENTATION
ORIENTATION: LEFT

## 2025-03-18 ASSESSMENT — PAIN DESCRIPTION - LOCATION
LOCATION: ANKLE
LOCATION: LEG
LOCATION: LEG
LOCATION: ANKLE

## 2025-03-18 ASSESSMENT — PAIN SCALES - GENERAL
PAINLEVEL_OUTOF10: 3
PAINLEVEL_OUTOF10: 9
PAINLEVEL_OUTOF10: 9
PAINLEVEL_OUTOF10: 8
PAINLEVEL_OUTOF10: 9

## 2025-03-18 ASSESSMENT — PAIN - FUNCTIONAL ASSESSMENT
PAIN_FUNCTIONAL_ASSESSMENT: NONE - DENIES PAIN
PAIN_FUNCTIONAL_ASSESSMENT: 0-10

## 2025-03-18 ASSESSMENT — PAIN SCALES - WONG BAKER: WONGBAKER_NUMERICALRESPONSE: HURTS A LITTLE BIT

## 2025-03-18 ASSESSMENT — PAIN DESCRIPTION - DESCRIPTORS
DESCRIPTORS: ACHING
DESCRIPTORS: THROBBING
DESCRIPTORS: DISCOMFORT
DESCRIPTORS: ACHING;DISCOMFORT;SORE
DESCRIPTORS: ACHING

## 2025-03-18 NOTE — PROGRESS NOTES
General Surgery   Daily Progress Note      Patient's Name/Date of Birth: Michelle Nettles / 1985    Date: March 18, 2025       Subjective: Did not compelete prep, not clear from below.     Objective:  BP (!) 145/88   Pulse 99   Temp 97.5 °F (36.4 °C)   Resp 18   Ht 1.499 m (4' 11\")   Wt 64 kg (141 lb 1.5 oz)   LMP  (LMP Unknown)   SpO2 100%   BMI 28.50 kg/m²   Labs:  Recent Labs     03/15/25  0742 03/16/25  1019 03/17/25  0738   WBC  --  5.4 5.6   HGB 8.4* 8.0* 7.8*   HCT 24.6* 23.7* 22.9*     Recent Labs     03/15/25  0742 03/16/25  1019 03/17/25  0738   NA  --  142 137   K 4.8 4.2 4.7   CL  --  103 101   CO2  --  27 27   BUN  --  41* 44*   CREATININE  --  7.0* 9.0*     No results for input(s): \"ALKPHOS\", \"ALT\", \"AST\", \"BILITOT\", \"BILIDIR\", \"LABALBU\", \"LIPASE\" in the last 72 hours.      General appearance: NAD  Head: NCAT, PERRL, EOMI, pink conjunctiva  Neck: supple, no masses  Lungs: symmetric chest rise, no audible wheezes  Heart: warm throughout  Abdomen: soft, non-distended, non-tender to palpation throughout  Skin: no visible lesions  Extremities: extremities normal, atraumatic, no cyanosis or edema      Assessment/Plan:  39 y.o. female w acute on chronic anemia, concern for GIB     - Ok for CLD today   - Prep to continue over next t2 days   - Plan for cscope on Thursday       Chase Melendez DO  General Surgery Resident

## 2025-03-18 NOTE — PROGRESS NOTES
[facility] Hospitalist Progress Note    Admitting Date and Time: 3/13/2025  8:02 PM  Admit Dx: Acute on chronic anemia [D64.9]  Severe anemia [D64.9]    Subjective:  Patient is being followed for Acute on chronic anemia [D64.9]  Severe anemia [D64.9]   Pt s/p c scope today, tolerated well, got HD yesterday.  Per RN: no major concerns.     ROS: denies fever, chills, cp, sob, n/v, HA unless stated above.      pantoprazole (PROTONIX) 40 mg in sodium chloride (PF) 0.9 % 10 mL injection  40 mg IntraVENous Daily    carvedilol  25 mg Oral QAM    [Held by provider] insulin glargine  8 Units SubCUTAneous Nightly    [Held by provider] insulin lispro  3 Units SubCUTAneous TID WC    sodium chloride flush  5-40 mL IntraVENous 2 times per day    insulin lispro  0-4 Units SubCUTAneous 4x Daily AC & HS    epoetin nadya-epbx  10,000 Units SubCUTAneous Once per day on Monday Wednesday Friday     calcium carbonate, 1,000 mg, TID PRN  albuterol, 5 mg, 4x Daily PRN  bisacodyl, 10 mg, Daily PRN  diphenhydrAMINE, 25 mg, Q6H PRN  sodium chloride flush, 5-40 mL, PRN  sodium chloride, , PRN  ondansetron, 4 mg, Q8H PRN   Or  ondansetron, 4 mg, Q6H PRN  melatonin, 3 mg, Nightly PRN  acetaminophen, 650 mg, Q6H PRN   Or  acetaminophen, 650 mg, Q6H PRN  nicotine polacrilex, 2 mg, Q1H PRN  glucose, 4 tablet, PRN  dextrose bolus, 125 mL, PRN   Or  dextrose bolus, 250 mL, PRN  glucagon (rDNA), 1 mg, PRN  dextrose, , Continuous PRN  sodium chloride, , PRN  EPINEPHrine PF, 0.3 mg, PRN  sodium chloride, , PRN  traMADol, 50 mg, Q6H PRN  HYDROmorphone, 0.2 mg, Q4H PRN  sodium chloride, , PRN  sodium chloride, , PRN         Objective:    BP (!) 138/57   Pulse 92   Temp 99.3 °F (37.4 °C) (Oral)   Resp 18   Ht 1.499 m (4' 11\")   Wt 64 kg (141 lb 1.5 oz)   LMP  (LMP Unknown)   SpO2 100%   BMI 28.50 kg/m²     General Appearance: alert and oriented to person, place and time and in no acute distress  Skin: warm and dry  Head: normocephalic and

## 2025-03-18 NOTE — PROGRESS NOTES
Patient question about possibility of pregnancy stated \"that there is no way I'm pregnant on depo shot\" Dr Heaton notified no further orders at this time

## 2025-03-18 NOTE — PROGRESS NOTES
Okay to discontinue the Golytley per Dr. Sen, everything was viewed on today's C-cope.    12:00 Okay for regular diet.

## 2025-03-18 NOTE — CARE COORDINATION
Updated plan of care. No labs this am. Pt HD on 3/17. Hgb 7.8. colonoscopy ordered by GI-possible colitis. Plan for procedure on 3/20.  Nuclear med bleeding scan done on 3/18. Pt on clear liquid diet. Plan is to return to [facility] upon discharge. Will need updated therapies and pre-cert. Continue 4L/NC(this is continuous). Will follow-o

## 2025-03-18 NOTE — PROGRESS NOTES
Subjective:  Patient is off floor for colonoscopy  No overnight event reported     Objective:    /62   Pulse 95   Temp 98.3 °F (36.8 °C) (Temporal)   Resp 18   Ht 1.499 m (4' 11\")   Wt 64 kg (141 lb 1.5 oz)   LMP  (LMP Unknown)   SpO2 100%   BMI 28.50 kg/m²     CBC with Differential:    Lab Results   Component Value Date/Time    WBC 5.6 03/17/2025 07:38 AM    RBC 2.60 03/17/2025 07:38 AM    RBC 2.92 02/22/2022 08:11 PM    HGB 7.8 03/17/2025 07:38 AM    HCT 22.9 03/17/2025 07:38 AM     03/17/2025 07:38 AM    MCV 88.1 03/17/2025 07:38 AM    MCH 30.0 03/17/2025 07:38 AM    MCHC 34.1 03/17/2025 07:38 AM    RDW 13.7 03/17/2025 07:38 AM    LYMPHOPCT 20 03/17/2025 07:38 AM    MONOPCT 14 03/17/2025 07:38 AM    MYELOPCT 0.9 03/24/2023 05:15 AM    EOSPCT 7 03/17/2025 07:38 AM    BASOPCT 0 03/17/2025 07:38 AM    MONOSABS 0.77 03/17/2025 07:38 AM    LYMPHSABS 1.14 03/17/2025 07:38 AM    EOSABS 0.39 03/17/2025 07:38 AM    BASOSABS 0.02 03/17/2025 07:38 AM     CMP:    Lab Results   Component Value Date/Time     03/17/2025 07:38 AM    K 4.7 03/17/2025 07:38 AM    K 5.2 06/30/2023 05:52 AM     03/17/2025 07:38 AM    CO2 27 03/17/2025 07:38 AM    BUN 44 03/17/2025 07:38 AM    CREATININE 9.0 03/17/2025 07:38 AM    GFRAA 5 07/29/2022 01:14 AM    LABGLOM 5 03/17/2025 07:38 AM    LABGLOM 8 04/02/2024 11:30 AM    GLUCOSE 103 03/17/2025 07:38 AM    GLUCOSE 279 07/27/2011 04:40 PM    CALCIUM 9.2 03/17/2025 07:38 AM    BILITOT 0.3 03/13/2025 08:45 PM    ALKPHOS 102 03/13/2025 08:45 PM    AST 12 03/13/2025 08:45 PM    ALT <5 03/13/2025 08:45 PM               Current Facility-Administered Medications:     polyethylene glycol (GoLYTELY) solution 4,000 mL, 4,000 mL, Oral, Once, Chase Melendez DO    pantoprazole (PROTONIX) 40 mg in sodium chloride (PF) 0.9 % 10 mL injection, 40 mg, IntraVENous, Daily, Chris Pearson DO, 40 mg at 03/17/25 1435    calcium carbonate (TUMS) chewable tablet 1,000 mg, 1,000 mg,  needed  -VALENTIN and iron infusions per nephrology    Continue current supportive care.  We will follow and coordinate discharge planning from hematology standpoint if necessary.          Electronically signed by ARLINE Sena on 3/18/2025 at 8:24 AM

## 2025-03-18 NOTE — PROGRESS NOTES
Consult Note      Reason for Consult: ESRD needing dialysis  Date of Service: 3/18/2025   Chief Complaint: had concerns including Low Hgb (Labs drawn this morning at [facility]. Hgb 5.8. Recent discharge from [facility] for vomiting/diarrhea. ).        HISTORY OF PRESENT ILLNESS from the 3/14/25 note:     Michelle Nettles is a 39 y.o. female with a past medical history of anemia of stage V CKD, recent left trimalleolar fracture, ESRD, poorly controlled IDDM, chronic HFpEF, & hidradenitis suppurativa who presented to the ED from [facility] for fatigue with low Hgb.  Patient recently hospitalized at [facility] after presenting with N/V/D on 3/3/2024.  Hgb was 5.2.  S/p repeat EGD showed gastritis & Schatzki's ring but no source of bleeding.  S/p explantation of left ankle hardware.  Patient is frustrated she had to return to the hospital once again.  She is conversationally dyspneic.  Over the past 2 days her face has been swelling up.  Was called to the bedside out of concern for transfusion reaction.  Held transfusion for now.  Gave Benadryl, Pepcid and 1 dose of IV Solu-Medrol. .     INTERVAL HX:    3/18/25: Pt awake alert and appropriate. She  is up in bed eating lunch at the time of my visit and states her appetite is good and no abd pain    Past Medical History:   Past Medical History:   Diagnosis Date    JOLLY (acute kidney injury) 04/05/2016    Anemia     AVF (arteriovenous fistula) 02/19/2018    let arm    Cellulitis, face     CHF (congestive heart failure) ([facility])     Chronic kidney disease     Chronic respiratory failure with hypoxia ([facility])     Dialysis AV fistula malfunction, initial encounter 11/07/2019    Displaced trimalleolar fracture of left lower leg, closed, initial encounter 02/22/2025    DM type 1 (diabetes mellitus, type 1) ([facility])     Encounter regarding vascular access for dialysis for ESRD ([facility]) 07/12/2018    ESRD (end stage renal disease) ([facility])     Hemodialysis  (LMP Unknown)   SpO2 100%   BMI 28.50 kg/m²   General: Well appearing. In no apparent distress.   Head: Normocephalic. Atraumatic. Oral mucosa moist.   Neck: No visible masses. No JVD. Conjunctiva clear. Sclera non-icteric.  Heart: Regular rate. Regular rhythm. No murmur.   Lungs: Clear to auscultation.  Abdomen: Soft.Nondistended.  Tender in the LLQ  Psychiatric: Oriented. Normal mood.  Skin: No rash. Warm dry. No lesions.  Extremities: Edema noted on upper as well as lower extremities.  She has edema present on her face as well, LUE AVF good bruit and thrill  LABS:    CBC:   Recent Labs     03/16/25  1019 03/17/25  0738 03/18/25  0935   WBC 5.4 5.6 5.3   HGB 8.0* 7.8* 8.4*    183 204     BMP:   Recent Labs     03/16/25  1019 03/17/25  0738    137   K 4.2 4.7    101   CO2 27 27   BUN 41* 44*   CREATININE 7.0* 9.0*   GLUCOSE 76 103*      Hepatic:   No results for input(s): \"AST\", \"ALT\", \"BILITOT\", \"ALKPHOS\" in the last 72 hours.    Invalid input(s): \"ALB\"    Troponin: No results for input(s): \"TROPONINI\" in the last 72 hours.  BNP: No results for input(s): \"BNP\" in the last 72 hours.  Lipids: No results for input(s): \"CHOL\", \"HDL\" in the last 72 hours.    Invalid input(s): \"LDLCALCU\"  ABGs: No results found for: \"PHART\", \"PO2ART\", \"MDK0SBB\"  INR: No results for input(s): \"INR\" in the last 72 hours.      No results for input(s): \"CAION\", \"FERRITIN\", \"IRON\", \"IRONPERSAT\", \"VITD25\", \"IPTH\" in the last 72 hours.    ASSESSMENT:        ESRD on dialysis  Patient is scheduled for dialysis on Monday Wednesday and Fridays.   PLAN:  Will continue MWF schedule-next treatment 3/19/25  Follow Labs    2. Electrolytes   Hyponatremia-sodium 137 mmol/L with -corrected post IHD  Potassium 4.7 mmol/L  Magnesium 2.0 mg/dL  Calcium 9.2 mg/dL with an albumin of 3.2 g/dL  Phosphorus 2.3 mg/dL   Plan  No changes  Correct Na+ with Dialysis as needed  3.Acid base  Last bicarbonate level 27 mmol/L   Last Chloride 101

## 2025-03-18 NOTE — ANESTHESIA POSTPROCEDURE EVALUATION
Department of Anesthesiology  Postprocedure Note    Patient: Michelle Nettles  MRN: [id number]  YOB: 1985  Date of evaluation: 3/18/2025    Procedure Summary       Date: 03/18/25 Room / Location: [address]    Anesthesia Start: 0742 Anesthesia Stop: 0808    Procedure: COLONOSCOPY DIAGNOSTIC Diagnosis:       Anemia      (Anemia [D64.9])    Surgeons: Madhu Cordova MD Responsible Provider: Koby Heaton MD    Anesthesia Type: MAC ASA Status: 4            Anesthesia Type: MAC    David Phase I: David Score: 10    David Phase II: David Score: 9    Anesthesia Post Evaluation    Patient location during evaluation: PACU  Patient participation: complete - patient participated  Level of consciousness: awake  Airway patency: patent  Nausea & Vomiting: no nausea and no vomiting  Cardiovascular status: hemodynamically stable  Respiratory status: acceptable  Hydration status: stable  Pain management: adequate    No notable events documented.

## 2025-03-19 ENCOUNTER — CARE COORDINATION (OUTPATIENT)
Dept: PRIMARY CARE CLINIC | Age: 40
End: 2025-03-19

## 2025-03-19 ENCOUNTER — CARE COORDINATION (OUTPATIENT)
Dept: CARE COORDINATION | Age: 40
End: 2025-03-19

## 2025-03-19 PROBLEM — K64.8 OTHER HEMORRHOIDS: Status: ACTIVE | Noted: 2025-03-19

## 2025-03-19 PROBLEM — K57.30 DIVERTICULOSIS LARGE INTESTINE W/O PERFORATION OR ABSCESS W/O BLEEDING: Status: ACTIVE | Noted: 2025-03-19

## 2025-03-19 LAB
ALBUMIN SERPL-MCNC: 3.2 G/DL (ref 3.5–5.2)
ALP SERPL-CCNC: 115 U/L (ref 35–104)
ALT SERPL-CCNC: <5 U/L (ref 0–32)
ANION GAP SERPL CALCULATED.3IONS-SCNC: 18 MMOL/L (ref 7–16)
AST SERPL-CCNC: 14 U/L (ref 0–31)
BASOPHILS # BLD: 0.03 K/UL (ref 0–0.2)
BASOPHILS NFR BLD: 1 % (ref 0–2)
BILIRUB SERPL-MCNC: 0.3 MG/DL (ref 0–1.2)
BUN SERPL-MCNC: 26 MG/DL (ref 6–20)
CALCIUM SERPL-MCNC: 9.1 MG/DL (ref 8.6–10.2)
CHLORIDE SERPL-SCNC: 98 MMOL/L (ref 98–107)
CO2 SERPL-SCNC: 22 MMOL/L (ref 22–29)
CREAT SERPL-MCNC: 8 MG/DL (ref 0.5–1)
EOSINOPHIL # BLD: 0.29 K/UL (ref 0.05–0.5)
EOSINOPHILS RELATIVE PERCENT: 5 % (ref 0–6)
ERYTHROCYTE [DISTWIDTH] IN BLOOD BY AUTOMATED COUNT: 13.9 % (ref 11.5–15)
GFR, ESTIMATED: 6 ML/MIN/1.73M2
GLUCOSE BLD-MCNC: 236 MG/DL (ref 74–99)
GLUCOSE BLD-MCNC: 273 MG/DL (ref 74–99)
GLUCOSE BLD-MCNC: 325 MG/DL (ref 74–99)
GLUCOSE BLD-MCNC: 437 MG/DL (ref 74–99)
GLUCOSE SERPL-MCNC: 459 MG/DL (ref 74–99)
HCT VFR BLD AUTO: 23.4 % (ref 34–48)
HGB BLD-MCNC: 7.6 G/DL (ref 11.5–15.5)
IMM GRANULOCYTES # BLD AUTO: <0.03 K/UL (ref 0–0.58)
IMM GRANULOCYTES NFR BLD: 0 % (ref 0–5)
LYMPHOCYTES NFR BLD: 0.73 K/UL (ref 1.5–4)
LYMPHOCYTES RELATIVE PERCENT: 12 % (ref 20–42)
MAGNESIUM SERPL-MCNC: 2.1 MG/DL (ref 1.6–2.6)
MCH RBC QN AUTO: 29.5 PG (ref 26–35)
MCHC RBC AUTO-ENTMCNC: 32.5 G/DL (ref 32–34.5)
MCV RBC AUTO: 90.7 FL (ref 80–99.9)
MONOCYTES NFR BLD: 0.59 K/UL (ref 0.1–0.95)
MONOCYTES NFR BLD: 10 % (ref 2–12)
NEUTROPHILS NFR BLD: 72 % (ref 43–80)
NEUTS SEG NFR BLD: 4.26 K/UL (ref 1.8–7.3)
PHOSPHATE SERPL-MCNC: 3.3 MG/DL (ref 2.5–4.5)
PLATELET # BLD AUTO: 208 K/UL (ref 130–450)
PMV BLD AUTO: 10 FL (ref 7–12)
POTASSIUM SERPL-SCNC: 5.1 MMOL/L (ref 3.5–5)
PROT SERPL-MCNC: 6.2 G/DL (ref 6.4–8.3)
RBC # BLD AUTO: 2.58 M/UL (ref 3.5–5.5)
SODIUM SERPL-SCNC: 138 MMOL/L (ref 132–146)
WBC OTHER # BLD: 5.9 K/UL (ref 4.5–11.5)

## 2025-03-19 PROCEDURE — 83735 ASSAY OF MAGNESIUM: CPT

## 2025-03-19 PROCEDURE — 99232 SBSQ HOSP IP/OBS MODERATE 35: CPT | Performed by: STUDENT IN AN ORGANIZED HEALTH CARE EDUCATION/TRAINING PROGRAM

## 2025-03-19 PROCEDURE — 2500000003 HC RX 250 WO HCPCS: Performed by: HOSPITALIST

## 2025-03-19 PROCEDURE — 6360000002 HC RX W HCPCS: Performed by: INTERNAL MEDICINE

## 2025-03-19 PROCEDURE — 85025 COMPLETE CBC W/AUTO DIFF WBC: CPT

## 2025-03-19 PROCEDURE — 90935 HEMODIALYSIS ONE EVALUATION: CPT

## 2025-03-19 PROCEDURE — 99222 1ST HOSP IP/OBS MODERATE 55: CPT | Performed by: INTERNAL MEDICINE

## 2025-03-19 PROCEDURE — 6370000000 HC RX 637 (ALT 250 FOR IP): Performed by: HOSPITALIST

## 2025-03-19 PROCEDURE — 6370000000 HC RX 637 (ALT 250 FOR IP)

## 2025-03-19 PROCEDURE — 6370000000 HC RX 637 (ALT 250 FOR IP): Performed by: STUDENT IN AN ORGANIZED HEALTH CARE EDUCATION/TRAINING PROGRAM

## 2025-03-19 PROCEDURE — 99232 SBSQ HOSP IP/OBS MODERATE 35: CPT | Performed by: SURGERY

## 2025-03-19 PROCEDURE — 2700000000 HC OXYGEN THERAPY PER DAY

## 2025-03-19 PROCEDURE — 84100 ASSAY OF PHOSPHORUS: CPT

## 2025-03-19 PROCEDURE — 82962 GLUCOSE BLOOD TEST: CPT

## 2025-03-19 PROCEDURE — 80053 COMPREHEN METABOLIC PANEL: CPT

## 2025-03-19 PROCEDURE — 6360000002 HC RX W HCPCS: Performed by: SURGERY

## 2025-03-19 PROCEDURE — 1200000000 HC SEMI PRIVATE

## 2025-03-19 PROCEDURE — 2580000003 HC RX 258: Performed by: SURGERY

## 2025-03-19 RX ORDER — HYDROCODONE BITARTRATE AND ACETAMINOPHEN 5; 325 MG/1; MG/1
1 TABLET ORAL EVERY 6 HOURS PRN
Status: DISCONTINUED | OUTPATIENT
Start: 2025-03-19 | End: 2025-03-25 | Stop reason: HOSPADM

## 2025-03-19 RX ORDER — INSULIN GLARGINE 100 [IU]/ML
10 INJECTION, SOLUTION SUBCUTANEOUS NIGHTLY
Status: DISCONTINUED | OUTPATIENT
Start: 2025-03-19 | End: 2025-03-20

## 2025-03-19 RX ORDER — INSULIN LISPRO 100 [IU]/ML
4 INJECTION, SOLUTION INTRAVENOUS; SUBCUTANEOUS
Status: DISCONTINUED | OUTPATIENT
Start: 2025-03-19 | End: 2025-03-20

## 2025-03-19 RX ADMIN — SODIUM CHLORIDE, PRESERVATIVE FREE 10 ML: 5 INJECTION INTRAVENOUS at 12:09

## 2025-03-19 RX ADMIN — HYDROMORPHONE HYDROCHLORIDE 0.2 MG: 1 INJECTION, SOLUTION INTRAMUSCULAR; INTRAVENOUS; SUBCUTANEOUS at 01:12

## 2025-03-19 RX ADMIN — INSULIN LISPRO 4 UNITS: 100 INJECTION, SOLUTION INTRAVENOUS; SUBCUTANEOUS at 17:31

## 2025-03-19 RX ADMIN — INSULIN LISPRO 2 UNITS: 100 INJECTION, SOLUTION INTRAVENOUS; SUBCUTANEOUS at 20:07

## 2025-03-19 RX ADMIN — SODIUM CHLORIDE, PRESERVATIVE FREE 10 ML: 5 INJECTION INTRAVENOUS at 20:11

## 2025-03-19 RX ADMIN — INSULIN LISPRO 1 UNITS: 100 INJECTION, SOLUTION INTRAVENOUS; SUBCUTANEOUS at 13:35

## 2025-03-19 RX ADMIN — INSULIN GLARGINE 10 UNITS: 100 INJECTION, SOLUTION SUBCUTANEOUS at 20:11

## 2025-03-19 RX ADMIN — HYDROMORPHONE HYDROCHLORIDE 0.2 MG: 1 INJECTION, SOLUTION INTRAMUSCULAR; INTRAVENOUS; SUBCUTANEOUS at 20:02

## 2025-03-19 RX ADMIN — INSULIN LISPRO 4 UNITS: 100 INJECTION, SOLUTION INTRAVENOUS; SUBCUTANEOUS at 06:11

## 2025-03-19 RX ADMIN — DIPHENHYDRAMINE HCL 25 MG: 25 TABLET ORAL at 20:02

## 2025-03-19 RX ADMIN — HYDROCODONE BITARTRATE AND ACETAMINOPHEN 1 TABLET: 5; 325 TABLET ORAL at 14:53

## 2025-03-19 RX ADMIN — INSULIN LISPRO 4 UNITS: 100 INJECTION, SOLUTION INTRAVENOUS; SUBCUTANEOUS at 13:36

## 2025-03-19 RX ADMIN — CARVEDILOL 25 MG: 25 TABLET, FILM COATED ORAL at 06:15

## 2025-03-19 RX ADMIN — SODIUM CHLORIDE, PRESERVATIVE FREE 40 MG: 5 INJECTION INTRAVENOUS at 12:08

## 2025-03-19 RX ADMIN — EPOETIN ALFA-EPBX 10000 UNITS: 10000 INJECTION, SOLUTION INTRAVENOUS; SUBCUTANEOUS at 17:32

## 2025-03-19 RX ADMIN — INSULIN LISPRO 3 UNITS: 100 INJECTION, SOLUTION INTRAVENOUS; SUBCUTANEOUS at 17:31

## 2025-03-19 ASSESSMENT — PAIN DESCRIPTION - DESCRIPTORS
DESCRIPTORS: ACHING;SORE;DISCOMFORT
DESCRIPTORS: ACHING;DISCOMFORT;SORE
DESCRIPTORS: ACHING;SORE;THROBBING

## 2025-03-19 ASSESSMENT — PAIN SCALES - WONG BAKER
WONGBAKER_NUMERICALRESPONSE: HURTS A LITTLE BIT
WONGBAKER_NUMERICALRESPONSE: HURTS A LITTLE BIT

## 2025-03-19 ASSESSMENT — PAIN DESCRIPTION - LOCATION
LOCATION: ANKLE

## 2025-03-19 ASSESSMENT — PAIN SCALES - GENERAL
PAINLEVEL_OUTOF10: 8
PAINLEVEL_OUTOF10: 9
PAINLEVEL_OUTOF10: 3
PAINLEVEL_OUTOF10: 9
PAINLEVEL_OUTOF10: 3

## 2025-03-19 ASSESSMENT — PAIN DESCRIPTION - ORIENTATION
ORIENTATION: LEFT

## 2025-03-19 NOTE — PROGRESS NOTES
Problem:    Anemia  Active Problems:    Chronic systolic (congestive) heart failure    Hidradenitis suppurativa    History of venous thromboembolism    MRSA colonization    ESRD needing dialysis (HCC)    Chronic hypoxic respiratory failure, on home oxygen therapy (HCC)    Poorly controlled type 2 diabetes mellitus (HCC)    Acute superficial gastritis without hemorrhage    Schatzki's ring of distal esophagus    Presence of artificial lower extremity    Severe anemia  Resolved Problems:    * No resolved hospital problems. *      Plan:  Acute on chronic severe Symptomatic Anemia: POA.  BL around 7/8 g/dL  S/P 4   units packed RBCs.  Follow-up H&H.  Patient states she is on iron and has has dark hard stools.  Denies any melena.    Consult hematology consult appreciated discussed case no evidence of intravascular hemolysis Occult GI bleed: fobt +    S/p C-scope in 7/2024 with internal hemorrhoids.    Multiple EGDs.    Needs pill endoscopy.   General Surgery consult.    S/p  colonoscopy on 3/18/2025  Neg Meckel's scan  3/17  Facial swelling: Etiology unclear, over the past 3 days.  She did undergo dialysis today.  Per nephrology patient's face always appear swollen.  GERD-patient reports that she feels as though she has acid reflux.  Patient's Protonix changed to IV on 3/16/2025 as patient states that she feels the IV works better.  Tums also added to patient's regimen.  No Florina-[place] is available at our facility.  IDDM with hyperglycemia: Labile blood sugars.  Continue Lantus 8 units nightly.  3 units AC 3 times daily with sliding scale.  hypertension, resume home meds  ESRD on dialysis, nephrology consulted for dialysis needs  Chronic elevation of troponin in the setting of ESRD, denies chest pain, monitor  Hypoglycemia - on ISS, hold premeal & basal Insulin.  S/P Nuclear bleeding scan 3/17, s/p colonoscopy  3/18.   CLD, plan to advance as tolerated  Dvt Prophylaxis - SCDs  Dispo  - Currently from [facility] and plan to return. Will need pre-cert.     NOTE: This report was transcribed using voice recognition software. Every effort was made to ensure accuracy; however, inadvertent computerized transcription errors may be present.  Electronically signed by Rachael Salcido MD on 3/19/2025 at 7:26 AM

## 2025-03-19 NOTE — CARE COORDINATION
Updated plan of care. Hgb 8.4 this am. K-5.2. [facility] can no longer take back. Discussed choices with pt. Referral called to [facility]. Await for benefits update. Will follow-mjo

## 2025-03-19 NOTE — CARE COORDINATION
Michelle is currently inpatient.  Discharge plan is to facility.  ACM will close care coordination and discontinue RPM.

## 2025-03-19 NOTE — PROGRESS NOTES
Subjective:  Chart reviewed, general surgery assessment and plan indicates colonoscopy negative for acute bleeding or source for blood loss  Endocrinology consulted poorly controlled diabetes mellitus  HD completed today  Shortness of breath earlier today but denies current abdominal pain, chest pain or other complaints    Objective:    BP (!) 151/74   Pulse (!) 112   Temp 98.6 °F (37 °C) (Axillary)   Resp 24   Ht 1.499 m (4' 11\")   Wt 64 kg (141 lb 1.5 oz)   LMP  (LMP Unknown)   SpO2 99%   BMI 28.50 kg/m²     General: NAD  HEENT: No thrush or mucositis, EOMI, PERRLA, facial fullness  Heart:  tachycardic, no murmurs  Lungs:  CTA bilaterally, no wheeze, rales or rhonchi  Abd: Tenderness on palpation mostly lower quadrants.  No significant rebound.  Extrem:  No clubbing, cyanosis.  Bilateral lower extremity edema  Lymphatics: No palpable adenopathy in cervical and supraclavicular regions  Skin: Intact, no petechia or purpura    CBC with Differential:    Lab Results   Component Value Date/Time    WBC 5.9 03/19/2025 07:35 AM    RBC 2.58 03/19/2025 07:35 AM    RBC 2.92 02/22/2022 08:11 PM    HGB 7.6 03/19/2025 07:35 AM    HCT 23.4 03/19/2025 07:35 AM     03/19/2025 07:35 AM    MCV 90.7 03/19/2025 07:35 AM    MCH 29.5 03/19/2025 07:35 AM    MCHC 32.5 03/19/2025 07:35 AM    RDW 13.9 03/19/2025 07:35 AM    LYMPHOPCT 12 03/19/2025 07:35 AM    MONOPCT 10 03/19/2025 07:35 AM    MYELOPCT 0.9 03/24/2023 05:15 AM    EOSPCT 5 03/19/2025 07:35 AM    BASOPCT 1 03/19/2025 07:35 AM    MONOSABS 0.59 03/19/2025 07:35 AM    LYMPHSABS 0.73 03/19/2025 07:35 AM    EOSABS 0.29 03/19/2025 07:35 AM    BASOSABS 0.03 03/19/2025 07:35 AM     CMP:    Lab Results   Component Value Date/Time     03/19/2025 07:35 AM    K 5.1 03/19/2025 07:35 AM    K 5.2 06/30/2023 05:52 AM    CL 98 03/19/2025 07:35 AM    CO2 22 03/19/2025 07:35 AM    BUN 26 03/19/2025 07:35 AM    CREATININE 8.0 03/19/2025 07:35 AM    GFRAA 5 07/29/2022 01:14 AM

## 2025-03-19 NOTE — FLOWSHEET NOTE
Pt started on a 3K bath and changed to 2K bath d/t K 5.1; pt completed 4 hrs of HD on a 2K bath with 1.5L of UF removed safely. Post report to Kathleen RODRIGUEZ. Dr. Perera rounded on tx   03/19/25 1115   Vital Signs   BP (!) 174/80   Temp 97.9 °F (36.6 °C)   Pulse 95   Respirations 16   Weight Method Bed scale   Post-Hemodialysis Assessment   Post-Treatment Procedures Blood returned;Access bleeding time < 10 minutes   Machine Disinfection Process Acid/Vinegar Clean;Heat Disinfect;Exterior Machine Disinfection   Rinseback Volume (ml) 300 ml   Blood Volume Processed (Liters) 81.2 L   Dialyzer Clearance Moderately streaked   Duration of Treatment (minutes) 240 minutes   Hemodialysis Intake (ml) 300 ml   Hemodialysis Output (ml) 1800 ml   NET Removed (ml) 1500   Patient Response to Treatment tolerated well; Post report to Kathleen RODRIGUEZ; Dr. Perera rounded on tx   Bilateral Breath Sounds Clear   Edema Facial   Time Off 1111   Patient Disposition Return to room   Observations & Evaluations   Level of Consciousness 0   Oriented X 3   Respiratory Quality/Effort Unlabored   O2 Device Nasal cannula   Skin Condition/Temp Dry;Warm   Abdomen Inspection Soft   Bowel Sounds (All Quadrants) Active

## 2025-03-19 NOTE — PROGRESS NOTES
Chief Complaint   Patient presents with    Low Hgb     Labs drawn this morning at [facility]. Hgb 5.8. Recent discharge from [facility] for vomiting/diarrhea.       I have examined the patient, performed key aspects of physical exam, and reviewed the record (including all new notes, radiology images which are independently interpreted, and laboratory findings).    GENERAL SURGERY PROGRESS NOTE    1 Day Post-Op    Subjective    No events overnight.      Scheduled medications:   insulin glargine  10 Units SubCUTAneous Nightly    insulin lispro  4 Units SubCUTAneous TID WC    pantoprazole (PROTONIX) 40 mg in sodium chloride (PF) 0.9 % 10 mL injection  40 mg IntraVENous Daily    carvedilol  25 mg Oral QAM    sodium chloride flush  5-40 mL IntraVENous 2 times per day    insulin lispro  0-4 Units SubCUTAneous 4x Daily AC & HS    epoetin nadya-epbx  10,000 Units SubCUTAneous Once per day on Monday Wednesday Friday       PRN medications:   HYDROcodone 5 mg - acetaminophen, calcium carbonate, albuterol, bisacodyl, diphenhydrAMINE, sodium chloride flush, sodium chloride, ondansetron **OR** ondansetron, melatonin, acetaminophen **OR** acetaminophen, nicotine polacrilex, glucose, dextrose bolus **OR** dextrose bolus, glucagon (rDNA), dextrose, sodium chloride, EPINEPHrine PF, sodium chloride, HYDROmorphone, sodium chloride, sodium chloride      Objective    Vitals:    03/19/25 0600 03/19/25 0704 03/19/25 1115 03/19/25 1145   BP: (!) 157/69 (!) 151/74 (!) 174/80 130/60   Pulse: (!) 115 (!) 112 95 98   Resp: 20 24 16 16   Temp:  98.6 °F (37 °C) 97.9 °F (36.6 °C)    TempSrc:  Axillary     SpO2: 96% 99%  100%   Weight:       Height:           I/O last 3 completed shifts:  In: 100 [I.V.:100]  Out: -      Gen: NAD  Resp: Breathing Comfortably  CV: Reg Rate  Abd: Soft, ND, No R/G       Labs:    CBC  Recent Labs     03/19/25  0735   WBC 5.9   HGB 7.6*   HCT 23.4*        BMP  Recent Labs     03/19/25  0735      K

## 2025-03-19 NOTE — PROGRESS NOTES
Consult Note      Reason for Consult: ESRD needing dialysis  Date of Service: 3/19/2025   Chief Complaint: had concerns including Low Hgb (Labs drawn this morning at [facility]. Hgb 5.8. Recent discharge from [facility] for vomiting/diarrhea. ).        HISTORY OF PRESENT ILLNESS from the 3/14/25 note:     Michelle Nettles is a 39 y.o. female with a past medical history of anemia of stage V CKD, recent left trimalleolar fracture, ESRD, poorly controlled IDDM, chronic HFpEF, & hidradenitis suppurativa who presented to the ED from [facility] for fatigue with low Hgb.  Patient recently hospitalized at [facility] after presenting with N/V/D on 3/3/2024.  Hgb was 5.2.  S/p repeat EGD showed gastritis & Schatzki's ring but no source of bleeding.  S/p explantation of left ankle hardware.  Patient is frustrated she had to return to the hospital once again.  She is conversationally dyspneic.  Over the past 2 days her face has been swelling up.  Was called to the bedside out of concern for transfusion reaction.  Held transfusion for now.  Gave Benadryl, Pepcid and 1 dose of IV Solu-Medrol. .     INTERVAL HX:    3/19/25: Pt awake alert and appropriate. She was seen in dialysis earlier today and was tolerating the procedure well. No c/o abd pain or CP    Past Medical History:   Past Medical History:   Diagnosis Date    JOLLY (acute kidney injury) 04/05/2016    Anemia     AVF (arteriovenous fistula) 02/19/2018    let arm    Cellulitis, face     CHF (congestive heart failure) ([facility])     Chronic kidney disease     Chronic respiratory failure with hypoxia ([facility])     Dialysis AV fistula malfunction, initial encounter 11/07/2019    Displaced trimalleolar fracture of left lower leg, closed, initial encounter 02/22/2025    DM type 1 (diabetes mellitus, type 1) ([facility])     Encounter regarding vascular access for dialysis for ESRD ([facility]) 07/12/2018    ESRD (end stage renal disease) ([facility])     Hemodialysis

## 2025-03-19 NOTE — CONSULTS
ENDOCRINOLOGY INITIAL CONSULTATION NOTE      Date of admission: 3/13/2025  Date of service: 3/19/2025  Admitting physician: Jorge Feliz DO   Primary Care Physician: Rogers Rodríguez MD  Consultant physician: Brennan Dyer MD     Reason for the consultation:  Uncontrolled DM    History of Present Illness:  The history is provided by the patient. Accuracy of the patient data is excellent    Michelle Nettles is a very pleasant 39 y.o. old female with recurrent anemia , ERSD, recent trimalleolar fracture, poorly controlled type 1 diabetes, hidradenitis suppurative admitted to [facility] from nursing facility on 3/13/2025 because of fatigue, endocrine service was consulted for uncontrolled diabetes management.    Patient is known to have chronic anemia, had recent several admissions with the same chief complains. Recent work up was only positive for mild gastritis, anemia is most likely multifactorial from recent surgery, anemia of chronic disease with ESRD. On admission, labs were significant for low hb 6.4, elevated blood glucose 374, recent A1c 5.9. Recent scans shows no concern for active bleeding.    On evaluation today, patient is frustrated, no acute concern. Denies nausea, vomiting.         Prior to admission  The patient was diagnosed with type 1 DM for many years . Prior to admission patient was on Lantus 10 U nightly, and Lispro 4 U TID with meals. Patient has had no hypoglycemic episodes. Patient has not been eating consistent carbohydrate meals, self-blood glucose monitoring has been above goal prior to admission. In addition, patient denied macrovascular or microvascular complications. The patient is not up to date with yearly diabetic eye exam.   Lab Results   Component Value Date/Time    LABA1C 5.9 03/03/2025 05:29 AM       Inpatient diet:   Carb Restricted diet     Point of care glucose monitoring   (Independently reviewed)   Recent Labs     03/17/25  1651 03/17/25  2111 03/18/25  0550 03/18/25  1127

## 2025-03-20 PROBLEM — R07.9 CHEST PAIN: Status: ACTIVE | Noted: 2025-03-20

## 2025-03-20 LAB
ALBUMIN SERPL-MCNC: 3.3 G/DL (ref 3.5–5.2)
ALP SERPL-CCNC: 107 U/L (ref 35–104)
ALT SERPL-CCNC: <5 U/L (ref 0–32)
ANION GAP SERPL CALCULATED.3IONS-SCNC: 11 MMOL/L (ref 7–16)
AST SERPL-CCNC: 14 U/L (ref 0–31)
BASOPHILS # BLD: 0.03 K/UL (ref 0–0.2)
BASOPHILS NFR BLD: 1 % (ref 0–2)
BILIRUB SERPL-MCNC: 0.3 MG/DL (ref 0–1.2)
BUN SERPL-MCNC: 13 MG/DL (ref 6–20)
CALCIUM SERPL-MCNC: 8.9 MG/DL (ref 8.6–10.2)
CHLORIDE SERPL-SCNC: 101 MMOL/L (ref 98–107)
CO2 SERPL-SCNC: 28 MMOL/L (ref 22–29)
CREAT SERPL-MCNC: 6 MG/DL (ref 0.5–1)
CRP SERPL HS-MCNC: <3 MG/L (ref 0–5)
EKG ATRIAL RATE: 102 BPM
EKG P AXIS: 61 DEGREES
EKG P-R INTERVAL: 118 MS
EKG Q-T INTERVAL: 336 MS
EKG QRS DURATION: 68 MS
EKG QTC CALCULATION (BAZETT): 437 MS
EKG R AXIS: 32 DEGREES
EKG T AXIS: 66 DEGREES
EKG VENTRICULAR RATE: 102 BPM
EOSINOPHIL # BLD: 0.26 K/UL (ref 0.05–0.5)
EOSINOPHILS RELATIVE PERCENT: 5 % (ref 0–6)
ERYTHROCYTE [DISTWIDTH] IN BLOOD BY AUTOMATED COUNT: 14.5 % (ref 11.5–15)
ERYTHROCYTE [SEDIMENTATION RATE] IN BLOOD BY WESTERGREN METHOD: 17 MM/HR (ref 0–20)
GFR, ESTIMATED: 9 ML/MIN/1.73M2
GLUCOSE BLD-MCNC: 101 MG/DL (ref 74–99)
GLUCOSE BLD-MCNC: 148 MG/DL (ref 74–99)
GLUCOSE BLD-MCNC: 173 MG/DL (ref 74–99)
GLUCOSE BLD-MCNC: 241 MG/DL (ref 74–99)
GLUCOSE BLD-MCNC: 54 MG/DL (ref 74–99)
GLUCOSE BLD-MCNC: 66 MG/DL (ref 74–99)
GLUCOSE SERPL-MCNC: 98 MG/DL (ref 74–99)
HCT VFR BLD AUTO: 24.6 % (ref 34–48)
HGB BLD-MCNC: 7.9 G/DL (ref 11.5–15.5)
IMM GRANULOCYTES # BLD AUTO: <0.03 K/UL (ref 0–0.58)
IMM GRANULOCYTES NFR BLD: 0 % (ref 0–5)
LYMPHOCYTES NFR BLD: 0.77 K/UL (ref 1.5–4)
LYMPHOCYTES RELATIVE PERCENT: 14 % (ref 20–42)
MAGNESIUM SERPL-MCNC: 2.1 MG/DL (ref 1.6–2.6)
MCH RBC QN AUTO: 29.5 PG (ref 26–35)
MCHC RBC AUTO-ENTMCNC: 32.1 G/DL (ref 32–34.5)
MCV RBC AUTO: 91.8 FL (ref 80–99.9)
MONOCYTES NFR BLD: 0.62 K/UL (ref 0.1–0.95)
MONOCYTES NFR BLD: 11 % (ref 2–12)
NEUTROPHILS NFR BLD: 70 % (ref 43–80)
NEUTS SEG NFR BLD: 3.87 K/UL (ref 1.8–7.3)
PHOSPHATE SERPL-MCNC: 3.3 MG/DL (ref 2.5–4.5)
PLATELET # BLD AUTO: 232 K/UL (ref 130–450)
PMV BLD AUTO: 9.5 FL (ref 7–12)
POTASSIUM SERPL-SCNC: 3.9 MMOL/L (ref 3.5–5)
PROT SERPL-MCNC: 6.2 G/DL (ref 6.4–8.3)
RBC # BLD AUTO: 2.68 M/UL (ref 3.5–5.5)
SODIUM SERPL-SCNC: 140 MMOL/L (ref 132–146)
T4 FREE SERPL-MCNC: 1 NG/DL (ref 0.9–1.7)
TROPONIN I SERPL HS-MCNC: 162 NG/L (ref 0–9)
TSH SERPL DL<=0.05 MIU/L-ACNC: 3.71 UIU/ML (ref 0.27–4.2)
WBC OTHER # BLD: 5.6 K/UL (ref 4.5–11.5)

## 2025-03-20 PROCEDURE — 6370000000 HC RX 637 (ALT 250 FOR IP)

## 2025-03-20 PROCEDURE — 2500000003 HC RX 250 WO HCPCS: Performed by: SURGERY

## 2025-03-20 PROCEDURE — 85025 COMPLETE CBC W/AUTO DIFF WBC: CPT

## 2025-03-20 PROCEDURE — 84100 ASSAY OF PHOSPHORUS: CPT

## 2025-03-20 PROCEDURE — 6360000002 HC RX W HCPCS: Performed by: SURGERY

## 2025-03-20 PROCEDURE — 6370000000 HC RX 637 (ALT 250 FOR IP): Performed by: HOSPITALIST

## 2025-03-20 PROCEDURE — 82962 GLUCOSE BLOOD TEST: CPT

## 2025-03-20 PROCEDURE — 84439 ASSAY OF FREE THYROXINE: CPT

## 2025-03-20 PROCEDURE — 93005 ELECTROCARDIOGRAM TRACING: CPT | Performed by: STUDENT IN AN ORGANIZED HEALTH CARE EDUCATION/TRAINING PROGRAM

## 2025-03-20 PROCEDURE — 85652 RBC SED RATE AUTOMATED: CPT

## 2025-03-20 PROCEDURE — 99223 1ST HOSP IP/OBS HIGH 75: CPT | Performed by: INTERNAL MEDICINE

## 2025-03-20 PROCEDURE — 93010 ELECTROCARDIOGRAM REPORT: CPT | Performed by: INTERNAL MEDICINE

## 2025-03-20 PROCEDURE — 84484 ASSAY OF TROPONIN QUANT: CPT

## 2025-03-20 PROCEDURE — 6370000000 HC RX 637 (ALT 250 FOR IP): Performed by: SURGERY

## 2025-03-20 PROCEDURE — 86140 C-REACTIVE PROTEIN: CPT

## 2025-03-20 PROCEDURE — APPSS180 APP SPLIT SHARED TIME > 60 MINUTES: Performed by: CLINICAL NURSE SPECIALIST

## 2025-03-20 PROCEDURE — 6360000002 HC RX W HCPCS: Performed by: INTERNAL MEDICINE

## 2025-03-20 PROCEDURE — 36415 COLL VENOUS BLD VENIPUNCTURE: CPT

## 2025-03-20 PROCEDURE — 6370000000 HC RX 637 (ALT 250 FOR IP): Performed by: CLINICAL NURSE SPECIALIST

## 2025-03-20 PROCEDURE — 2580000003 HC RX 258: Performed by: SURGERY

## 2025-03-20 PROCEDURE — 83735 ASSAY OF MAGNESIUM: CPT

## 2025-03-20 PROCEDURE — 80053 COMPREHEN METABOLIC PANEL: CPT

## 2025-03-20 PROCEDURE — 2700000000 HC OXYGEN THERAPY PER DAY

## 2025-03-20 PROCEDURE — 6370000000 HC RX 637 (ALT 250 FOR IP): Performed by: STUDENT IN AN ORGANIZED HEALTH CARE EDUCATION/TRAINING PROGRAM

## 2025-03-20 PROCEDURE — 99232 SBSQ HOSP IP/OBS MODERATE 35: CPT | Performed by: INTERNAL MEDICINE

## 2025-03-20 PROCEDURE — 1200000000 HC SEMI PRIVATE

## 2025-03-20 PROCEDURE — 84443 ASSAY THYROID STIM HORMONE: CPT

## 2025-03-20 PROCEDURE — 99232 SBSQ HOSP IP/OBS MODERATE 35: CPT | Performed by: STUDENT IN AN ORGANIZED HEALTH CARE EDUCATION/TRAINING PROGRAM

## 2025-03-20 RX ORDER — INSULIN GLARGINE 100 [IU]/ML
9 INJECTION, SOLUTION SUBCUTANEOUS EVERY MORNING
Status: DISCONTINUED | OUTPATIENT
Start: 2025-03-21 | End: 2025-03-25 | Stop reason: HOSPADM

## 2025-03-20 RX ORDER — INSULIN LISPRO 100 [IU]/ML
3 INJECTION, SOLUTION INTRAVENOUS; SUBCUTANEOUS
Status: DISCONTINUED | OUTPATIENT
Start: 2025-03-21 | End: 2025-03-25 | Stop reason: HOSPADM

## 2025-03-20 RX ORDER — CARVEDILOL 25 MG/1
25 TABLET ORAL 2 TIMES DAILY WITH MEALS
Status: DISCONTINUED | OUTPATIENT
Start: 2025-03-20 | End: 2025-03-23

## 2025-03-20 RX ORDER — SODIUM CHLORIDE 9 MG/ML
INJECTION, SOLUTION INTRAVENOUS PRN
Status: DISCONTINUED | OUTPATIENT
Start: 2025-03-20 | End: 2025-03-25 | Stop reason: HOSPADM

## 2025-03-20 RX ORDER — NITROGLYCERIN 0.4 MG/1
0.4 TABLET SUBLINGUAL PRN
Status: DISCONTINUED | OUTPATIENT
Start: 2025-03-20 | End: 2025-03-25 | Stop reason: HOSPADM

## 2025-03-20 RX ORDER — METOPROLOL TARTRATE 50 MG
100 TABLET ORAL PRN
Status: DISCONTINUED | OUTPATIENT
Start: 2025-03-20 | End: 2025-03-25 | Stop reason: HOSPADM

## 2025-03-20 RX ORDER — NITROGLYCERIN 0.4 MG/1
0.8 TABLET SUBLINGUAL PRN
Status: DISCONTINUED | OUTPATIENT
Start: 2025-03-20 | End: 2025-03-25 | Stop reason: HOSPADM

## 2025-03-20 RX ORDER — METOPROLOL TARTRATE 50 MG
50 TABLET ORAL PRN
Status: DISCONTINUED | OUTPATIENT
Start: 2025-03-20 | End: 2025-03-25 | Stop reason: HOSPADM

## 2025-03-20 RX ORDER — SODIUM CHLORIDE 0.9 % (FLUSH) 0.9 %
5-40 SYRINGE (ML) INJECTION EVERY 12 HOURS SCHEDULED
Status: DISCONTINUED | OUTPATIENT
Start: 2025-03-20 | End: 2025-03-25 | Stop reason: HOSPADM

## 2025-03-20 RX ORDER — METOPROLOL TARTRATE 50 MG
150 TABLET ORAL PRN
Status: DISCONTINUED | OUTPATIENT
Start: 2025-03-20 | End: 2025-03-25 | Stop reason: HOSPADM

## 2025-03-20 RX ORDER — SODIUM CHLORIDE 0.9 % (FLUSH) 0.9 %
5-40 SYRINGE (ML) INJECTION PRN
Status: DISCONTINUED | OUTPATIENT
Start: 2025-03-20 | End: 2025-03-25 | Stop reason: HOSPADM

## 2025-03-20 RX ADMIN — INSULIN LISPRO 4 UNITS: 100 INJECTION, SOLUTION INTRAVENOUS; SUBCUTANEOUS at 12:37

## 2025-03-20 RX ADMIN — CARVEDILOL 25 MG: 25 TABLET, FILM COATED ORAL at 10:04

## 2025-03-20 RX ADMIN — HYDROMORPHONE HYDROCHLORIDE 0.2 MG: 1 INJECTION, SOLUTION INTRAMUSCULAR; INTRAVENOUS; SUBCUTANEOUS at 22:40

## 2025-03-20 RX ADMIN — CALCIUM CARBONATE 1000 MG: 500 TABLET, CHEWABLE ORAL at 10:28

## 2025-03-20 RX ADMIN — SODIUM CHLORIDE, PRESERVATIVE FREE 40 MG: 5 INJECTION INTRAVENOUS at 10:18

## 2025-03-20 RX ADMIN — SODIUM CHLORIDE, PRESERVATIVE FREE 10 ML: 5 INJECTION INTRAVENOUS at 19:41

## 2025-03-20 RX ADMIN — HYDROMORPHONE HYDROCHLORIDE 0.2 MG: 1 INJECTION, SOLUTION INTRAMUSCULAR; INTRAVENOUS; SUBCUTANEOUS at 16:23

## 2025-03-20 RX ADMIN — ONDANSETRON 4 MG: 2 INJECTION, SOLUTION INTRAMUSCULAR; INTRAVENOUS at 18:12

## 2025-03-20 RX ADMIN — INSULIN LISPRO 4 UNITS: 100 INJECTION, SOLUTION INTRAVENOUS; SUBCUTANEOUS at 10:21

## 2025-03-20 RX ADMIN — CARVEDILOL 25 MG: 25 TABLET, FILM COATED ORAL at 16:23

## 2025-03-20 RX ADMIN — INSULIN LISPRO 4 UNITS: 100 INJECTION, SOLUTION INTRAVENOUS; SUBCUTANEOUS at 17:28

## 2025-03-20 RX ADMIN — DIPHENHYDRAMINE HCL 25 MG: 25 TABLET ORAL at 22:40

## 2025-03-20 RX ADMIN — HYDROMORPHONE HYDROCHLORIDE 0.2 MG: 1 INJECTION, SOLUTION INTRAMUSCULAR; INTRAVENOUS; SUBCUTANEOUS at 10:18

## 2025-03-20 RX ADMIN — INSULIN LISPRO 3 UNITS: 100 INJECTION, SOLUTION INTRAVENOUS; SUBCUTANEOUS at 12:35

## 2025-03-20 RX ADMIN — INSULIN LISPRO 1 UNITS: 100 INJECTION, SOLUTION INTRAVENOUS; SUBCUTANEOUS at 07:50

## 2025-03-20 RX ADMIN — HYDROCODONE BITARTRATE AND ACETAMINOPHEN 1 TABLET: 5; 325 TABLET ORAL at 15:11

## 2025-03-20 ASSESSMENT — PAIN SCALES - WONG BAKER: WONGBAKER_NUMERICALRESPONSE: HURTS A LITTLE BIT

## 2025-03-20 ASSESSMENT — PAIN SCALES - GENERAL
PAINLEVEL_OUTOF10: 6
PAINLEVEL_OUTOF10: 9
PAINLEVEL_OUTOF10: 0
PAINLEVEL_OUTOF10: 4

## 2025-03-20 ASSESSMENT — PAIN DESCRIPTION - DESCRIPTORS: DESCRIPTORS: ACHING;DISCOMFORT;SORE

## 2025-03-20 ASSESSMENT — PAIN DESCRIPTION - LOCATION: LOCATION: ANKLE

## 2025-03-20 ASSESSMENT — PAIN DESCRIPTION - ORIENTATION: ORIENTATION: LEFT

## 2025-03-20 NOTE — PLAN OF CARE
Problem: Chronic Conditions and Co-morbidities  Goal: Patient's chronic conditions and co-morbidity symptoms are monitored and maintained or improved  3/20/2025 1215 by Penny Robertson RN  Outcome: Progressing  3/20/2025 0110 by Peterson Mojica, RN  Outcome: Progressing     Problem: Discharge Planning  Goal: Discharge to home or other facility with appropriate resources  3/20/2025 1215 by Penny Robertson RN  Outcome: Progressing  3/20/2025 0110 by Peterson Mojica RN  Outcome: Progressing     Problem: Pain  Goal: Verbalizes/displays adequate comfort level or baseline comfort level  3/20/2025 1215 by Penny Robertson RN  Outcome: Progressing  3/20/2025 0110 by Peterson Mojica, RN  Outcome: Progressing     Problem: Safety - Adult  Goal: Free from fall injury  3/20/2025 1215 by Penny Robertson RN  Outcome: Progressing  3/20/2025 0110 by Peterson Mojica, RN  Outcome: Progressing      No

## 2025-03-20 NOTE — PROGRESS NOTES
Dr. Salcido notified of elevated trop, see new consult for cardiology. Krystina Anderson notified.

## 2025-03-20 NOTE — CONSULTS
Inpatient Cardiology Consultation      Reason for Consult:  Chest Pain     Consulting Physician: Dr Forman     Requesting Physician:  Dr. Salcido     Date of Consultation: 3/20/2025    HISTORY OF PRESENT ILLNESS:   Michelle Nettles  is a 39 y.o.  female known to Dr Cruz seen inpatient 7/2024 for hypervolemia after missing dialysis    PMH: see below    Just discharged from the hospital on 3/11/2025 after receiving 4 units PBRC S/p repeat EGD showed gastritis & Schatzki's ring but no source of bleeding.   ED 3/13/2025 via from nursing home EMS for a low hemoglobin Hgb 5.8 with symptoms of fatigue and nausea  ED treatment: Protonix, Zofran, insulin, Dilaudid  Significant recent Labs: Troponin 162 NT proBNP 8415 on 3/13/2025  (was 13,377 on 1/10/25) BUN 26 CR 8 K5.1 mag 2.1 albumin 3.2 alk phos 115 WBC 5.9 Hgb 5.8 >>6.4>>5.3>>4.3>>5.6>>6.3>>6.4>>8.4>>7.6 patient has had 5 units PRBC since admit   RAD: 2 view chest x-ray 3/13/2025 mild cardiomegaly  General Surgery on consult for anemia C-scope negative no plan for any further testing  Nephrology consulted for ESRD last HD 3/19/2025 removed 1500  Endocrine consulted for DM  Oncology consulted for severe anemia felt to be multifactorial including ESRD, GI blood loss, iron deficiency  Patient seen and examined.  Recent vitals: /76 HR 99 SpO2 96% on 2 L nasal cannula afebrile weight 157 pounds BMI 32    Patient reports that chest pain started yesterday & today.  Mid sternal - top of chest -  \"like something sits on my chest\" non radiating, rated 7-8/10. Unsure how long it lasted - maybe minutes - was able to fall asleep, when awoke today, was still there but less severe 5-6/10 - worse when lay flat & better when sitting up in chair.  Reports associated symptoms of SOB, diaphoresis, N, Palpitations.  Denies vomit / heartburn or dizziness.   No current distress.  + c/o orthopnea - chronic.  + facial and R foot swelling.  No longer makes urine.  No  14   ALT <5         Assessment / Plan: As per Dr. Torres addendum to follow      Krystina [facility] Cardiology     Electronically signed by KEMI Juan on 3/20/2025 at 11:51 AM       CARDIOLOGY ATTENDING ATTESTATION (DR. TORRES)  Our JAI exam, assessment reviewed and reflect my work.   I personally saw, examined, and evaluated the patient today.  I personally reviewed the medications, rhythm strips, pertinent labs and test reports.    I directly participated in the medical-decision making, ordering pertinent tests and medication adjustment.  I am the primary author for the consult notes.  I spent > 51% of the total time involved with the encounter and 100% of assessment and recommendations.  The total time included the following:  Independently interviewing the patient (HPI, ROS, PMH, PSH, FMH, SH, allergies, and medications)  Reviewing available records, results of previously ordered testing/procedures, and current problem list  Independently performing a medically appropriate examination  Reviewing the above documentation completed by the JAI  Ordering medications, tests, and/or procedures as required  Formulating the assessment/plan and reviewing the rationale for the above recommendations  Counseling/educating the patient  Coordinating care with other healthcare professionals  Communicating results to the patient's family/caregiver as available  Documenting clinical information in the patient's electronic health record      I independently interviewed and examined the patient. I have reviewed the above documentation completed by the JAI. Please see my additional contributions to the HPI, physical exam, and assessment / medical decision making.  39-year-old female with below medical history who is hospitalized for acute blood loss anemia requiring multiple transfusions but workup for GI bleed currently appears to be unrevealing and cardiology consulted for atypical chest pain

## 2025-03-20 NOTE — PROGRESS NOTES
Subjective:  Chart reviewed  EKG completed overnight, patient reported diaphoresis  Troponins checked chronically high  Cardiology consulted  Denies chest pain or pressure, GI upset today  Feels face is swollen, discussed additional fluid removal at her next dialysis treatment with nephrology  No additional complaints    Objective:    BP (!) 153/76   Pulse 99   Temp 98.2 °F (36.8 °C) (Oral)   Resp 18   Ht 1.499 m (4' 11\")   Wt 71.5 kg (157 lb 10.1 oz)   LMP  (LMP Unknown)   SpO2 96%   BMI 31.84 kg/m²     General: NAD  HEENT: No thrush or mucositis, EOMI, PERRLA, facial fullness  Heart: Regular rate and rhythm, no murmurs  Lungs:  CTA bilaterally, no wheeze, rales or rhonchi  Abd: Positive bowel sounds, soft, nontender today  Extrem:  No clubbing, cyanosis.  Bilateral lower extremity edema, dressing left lower extremity  Lymphatics: No palpable adenopathy in cervical and supraclavicular regions  Skin: Intact, no petechia or purpura    CBC with Differential:    Lab Results   Component Value Date/Time    WBC 5.6 03/20/2025 12:50 PM    RBC 2.68 03/20/2025 12:50 PM    RBC 2.92 02/22/2022 08:11 PM    HGB 7.9 03/20/2025 12:50 PM    HCT 24.6 03/20/2025 12:50 PM     03/20/2025 12:50 PM    MCV 91.8 03/20/2025 12:50 PM    MCH 29.5 03/20/2025 12:50 PM    MCHC 32.1 03/20/2025 12:50 PM    RDW 14.5 03/20/2025 12:50 PM    LYMPHOPCT 14 03/20/2025 12:50 PM    MONOPCT 11 03/20/2025 12:50 PM    MYELOPCT 0.9 03/24/2023 05:15 AM    EOSPCT 5 03/20/2025 12:50 PM    BASOPCT 1 03/20/2025 12:50 PM    MONOSABS 0.62 03/20/2025 12:50 PM    LYMPHSABS 0.77 03/20/2025 12:50 PM    EOSABS 0.26 03/20/2025 12:50 PM    BASOSABS 0.03 03/20/2025 12:50 PM     CMP:    Lab Results   Component Value Date/Time     03/19/2025 07:35 AM    K 5.1 03/19/2025 07:35 AM    K 5.2 06/30/2023 05:52 AM    CL 98 03/19/2025 07:35 AM    CO2 22 03/19/2025 07:35 AM    BUN 26 03/19/2025 07:35 AM    CREATININE 8.0 03/19/2025 07:35 AM    GFRAA 5 07/29/2022  MD, 4 mg at 03/16/25 1621    melatonin tablet 3 mg, 3 mg, Oral, Nightly PRN, Madhu Cordova MD    acetaminophen (TYLENOL) tablet 650 mg, 650 mg, Oral, Q6H PRN **OR** acetaminophen (TYLENOL) suppository 650 mg, 650 mg, Rectal, Q6H PRN, Madhu Cordova MD    nicotine polacrilex (COMMIT) lozenge 2 mg, 2 mg, Oral, Q1H PRN, Madhu Cordova MD    insulin lispro (HUMALOG,ADMELOG) injection vial 0-4 Units, 0-4 Units, SubCUTAneous, 4x Daily AC & HS, Madhu Cordova MD, 3 Units at 03/20/25 1235    glucose chewable tablet 16 g, 4 tablet, Oral, PRN, Madhu Cordova MD    dextrose bolus 10% 125 mL, 125 mL, IntraVENous, PRN **OR** dextrose bolus 10% 250 mL, 250 mL, IntraVENous, PRN, Madhu Cordova MD    glucagon injection 1 mg, 1 mg, SubCUTAneous, PRN, Madhu Cordova MD, 1 mg at 03/17/25 0730    dextrose 10 % infusion, , IntraVENous, Continuous PRN, Madhu Cordova MD    EPINEPHrine PF 1 MG/ML injection 0.3 mg, 0.3 mg, IntraMUSCular, PRN, Madhu Cordova MD    HYDROmorphone (DILAUDID) injection 0.2 mg, 0.2 mg, IntraVENous, Q4H PRN, Madhu Cordova MD, 0.2 mg at 03/20/25 1018    epoetin nadya-epbx (RETACRIT) injection 10,000 Units, 10,000 Units, SubCUTAneous, Once per day on Monday Wednesday Friday, Madhu Cordova MD, 10,000 Units at 03/19/25 1732    0.9 % sodium chloride infusion, , IntraVENous, PRN, Madhu Cordova MD    Assessment:    Principal Problem:    Anemia  Active Problems:    Chronic systolic (congestive) heart failure    Hidradenitis suppurativa    History of venous thromboembolism    MRSA colonization    ESRD needing dialysis (HCC)    Chronic hypoxic respiratory failure, on home oxygen therapy (HCC)    Poorly controlled type 2 diabetes mellitus (HCC)    Acute superficial gastritis without hemorrhage    Schatzki's ring of distal esophagus    Presence of artificial lower extremity    Severe anemia  Resolved Problems:    * No resolved hospital problems. *    39 female with ESRD on HD, IDDM, recent surgical repair of

## 2025-03-20 NOTE — PROGRESS NOTES
[facility]   Nephrology Progress Note    Michelle Nettles  03/20/25  1:07 PM    Reason for Consult: ESRD needing dialysis    HISTORY OF PRESENT ILLNESS from the 3/14/25 note:     Michelle Nettles is a 39 y.o. female with a past medical history of anemia of stage V CKD, recent left trimalleolar fracture, ESRD, poorly controlled IDDM, chronic HFpEF, & hidradenitis suppurativa who presented to the ED from [facility] for fatigue with low Hgb.  Patient recently hospitalized at [place] after presenting with N/V/D on 3/3/2024.  Hgb was 5.2.  S/p repeat EGD showed gastritis & Schatzki's ring but no source of bleeding.  S/p explantation of left ankle hardware.  Patient is frustrated she had to return to the hospital once again.  She is conversationally dyspneic.  Over the past 2 days her face has been swelling up.  Was called to the bedside out of concern for transfusion reaction.  Held transfusion for now.  Gave Benadryl, Pepcid and 1 dose of IV Solu-Medrol. .     INTERVAL HX:    3/19/25: Pt awake alert and appropriate. She was seen in dialysis earlier today and was tolerating the procedure well. No c/o abd pain or CP    3/20: Resting comfortably in bed. Notes some increased facial swelling otherwise no new issues reported this AM.    Past Medical History:   Past Medical History:   Diagnosis Date    JOLLY (acute kidney injury) 04/05/2016    Anemia     AVF (arteriovenous fistula) 02/19/2018    let arm    Cellulitis, face     CHF (congestive heart failure) ([facility])     Chronic kidney disease     Chronic respiratory failure with hypoxia ([facility])     Dialysis AV fistula malfunction, initial encounter 11/07/2019    Displaced trimalleolar fracture of left lower leg, closed, initial encounter 02/22/2025    DM type 1 (diabetes mellitus, type 1) ([facility])     Encounter regarding vascular access for dialysis for ESRD ([facility]) 07/12/2018    ESRD (end stage renal disease) ([facility])     Hemodialysis patient    SpO2 96%   BMI 31.84 kg/m²   General: Well appearing. In no apparent distress.   Head: Normocephalic. Atraumatic. Oral mucosa moist.   Neck: No visible masses. No JVD. Conjunctiva clear. Sclera non-icteric.  Heart: Regular rate. Regular rhythm. No murmur.   Lungs: Clear to auscultation.  Abdomen: Soft.Nondistended.  slightly tender in the LLQ  Psychiatric: Oriented. Normal mood.  Skin: No rash. Warm dry. No lesions.  Extremities: Edema noted on upper as well as lower extremities.  She has edema present on her face as well, LUE AVF good bruit and thrill  LABS:    CBC:   Recent Labs     03/18/25  0935 03/19/25  0735   WBC 5.3 5.9   HGB 8.4* 7.6*    208     BMP:   Recent Labs     03/18/25  1752 03/19/25  0735    138   K 5.2* 5.1*   CL 97* 98   CO2 27 22   BUN 18 26*   CREATININE 6.6* 8.0*   GLUCOSE 306* 459*      Hepatic:   Recent Labs     03/19/25  0735   AST 14   ALT <5   BILITOT 0.3   ALKPHOS 115*       Troponin: No results for input(s): \"TROPONINI\" in the last 72 hours.  BNP: No results for input(s): \"BNP\" in the last 72 hours.  Lipids: No results for input(s): \"CHOL\", \"HDL\" in the last 72 hours.    Invalid input(s): \"LDLCALCU\"  ABGs: No results found for: \"PHART\", \"PO2ART\", \"ZMS3MKQ\"  INR: No results for input(s): \"INR\" in the last 72 hours.      No results for input(s): \"CAION\", \"FERRITIN\", \"IRON\", \"IRONPERSAT\", \"VITD25\", \"IPTH\" in the last 72 hours.    ASSESSMENT:        ESRD on dialysis  Patient is scheduled for dialysis on Monday Wednesday and Fridays.   PLAN:  Will continue MWF schedule-next treatment 3/21/25  Follow Labs    2. Electrolytes   Hyponatremia-sodium 138 mmol/L with -corrected post IHD  Potassium 5.1 mmol/L  Magnesium 2.1 mg/dL  Calcium 9.1 mg/dL with an albumin of 3.2 g/dL  Phosphorus 3.3 mg/dL   Plan  No changes  Correct K+ with Dialysis  3.Acid base  Last bicarbonate level 22 mmol/L   Last Chloride 98 mmol/dL, AG 18 mmol/L with last albumin of 3.2 g/dL  4.Anemia in CKD-Low

## 2025-03-20 NOTE — PROGRESS NOTES
[facility] Hospitalist Progress Note    Admitting Date and Time: 3/13/2025  8:02 PM  Admit Dx: Acute on chronic anemia [D64.9]  Severe anemia [D64.9]    Subjective:  Patient is being followed for Acute on chronic anemia [D64.9]  Severe anemia [D64.9]   Pt reports chest discomfort & dyspnea.  Per RN: no major concerns.     ROS: denies fever, chills, cp, sob, n/v, HA unless stated above.      insulin glargine  10 Units SubCUTAneous Nightly    insulin lispro  4 Units SubCUTAneous TID WC    pantoprazole (PROTONIX) 40 mg in sodium chloride (PF) 0.9 % 10 mL injection  40 mg IntraVENous Daily    carvedilol  25 mg Oral QAM    sodium chloride flush  5-40 mL IntraVENous 2 times per day    insulin lispro  0-4 Units SubCUTAneous 4x Daily AC & HS    epoetin nadya-epbx  10,000 Units SubCUTAneous Once per day on Monday Wednesday Friday     HYDROcodone 5 mg - acetaminophen, 1 tablet, Q6H PRN  calcium carbonate, 1,000 mg, TID PRN  albuterol, 5 mg, 4x Daily PRN  bisacodyl, 10 mg, Daily PRN  diphenhydrAMINE, 25 mg, Q6H PRN  sodium chloride flush, 5-40 mL, PRN  sodium chloride, , PRN  ondansetron, 4 mg, Q8H PRN   Or  ondansetron, 4 mg, Q6H PRN  melatonin, 3 mg, Nightly PRN  acetaminophen, 650 mg, Q6H PRN   Or  acetaminophen, 650 mg, Q6H PRN  nicotine polacrilex, 2 mg, Q1H PRN  glucose, 4 tablet, PRN  dextrose bolus, 125 mL, PRN   Or  dextrose bolus, 250 mL, PRN  glucagon (rDNA), 1 mg, PRN  dextrose, , Continuous PRN  EPINEPHrine PF, 0.3 mg, PRN  HYDROmorphone, 0.2 mg, Q4H PRN  sodium chloride, , PRN         Objective:    BP (!) 153/76   Pulse 99   Temp 98.2 °F (36.8 °C) (Oral)   Resp 18   Ht 1.499 m (4' 11\")   Wt 71.5 kg (157 lb 10.1 oz)   LMP  (LMP Unknown)   SpO2 96%   BMI 31.84 kg/m²     General Appearance: alert and oriented to person, place and time and in no acute distress  Skin: warm and dry  Head: normocephalic and atraumatic  Eyes: pupils equal, round, and reactive to light, extraocular eye movements intact,

## 2025-03-20 NOTE — PROGRESS NOTES
Pt glucose was 54 but pt refused the glucose tabs on chart but rather had an oral glucose gel in her emergency bag. Monitoring pt for now

## 2025-03-20 NOTE — CARE COORDINATION
Updated plan of care. Accepted at [facility]. Will need updated therapies. Labs pending. Will follow-o

## 2025-03-21 ENCOUNTER — APPOINTMENT (OUTPATIENT)
Age: 40
DRG: 811 | End: 2025-03-21
Payer: MEDICARE

## 2025-03-21 DIAGNOSIS — N18.6 ESRD ON HEMODIALYSIS (HCC): ICD-10-CM

## 2025-03-21 DIAGNOSIS — I95.9 HYPOTENSION, UNSPECIFIED HYPOTENSION TYPE: ICD-10-CM

## 2025-03-21 DIAGNOSIS — E10.65 TYPE 1 DIABETES MELLITUS WITH HYPERGLYCEMIA (HCC): ICD-10-CM

## 2025-03-21 DIAGNOSIS — I50.9 ACUTE DECOMPENSATED HEART FAILURE (HCC): Primary | ICD-10-CM

## 2025-03-21 DIAGNOSIS — Z99.2 ESRD ON HEMODIALYSIS (HCC): ICD-10-CM

## 2025-03-21 LAB
ALBUMIN SERPL-MCNC: 3.3 G/DL (ref 3.5–5.2)
ALP SERPL-CCNC: 117 U/L (ref 35–104)
ALT SERPL-CCNC: <5 U/L (ref 0–32)
ANION GAP SERPL CALCULATED.3IONS-SCNC: 14 MMOL/L (ref 7–16)
AST SERPL-CCNC: 17 U/L (ref 0–31)
BASOPHILS # BLD: 0.02 K/UL (ref 0–0.2)
BASOPHILS NFR BLD: 0 % (ref 0–2)
BILIRUB SERPL-MCNC: 0.2 MG/DL (ref 0–1.2)
BUN SERPL-MCNC: 17 MG/DL (ref 6–20)
CALCIUM SERPL-MCNC: 8.6 MG/DL (ref 8.6–10.2)
CHLORIDE SERPL-SCNC: 96 MMOL/L (ref 98–107)
CO2 SERPL-SCNC: 24 MMOL/L (ref 22–29)
CREAT SERPL-MCNC: 7.2 MG/DL (ref 0.5–1)
ECHO AO ASC DIAM: 3 CM
ECHO AO ASCENDING AORTA INDEX: 1.78 CM/M2
ECHO AO ROOT DIAM: 2.6 CM
ECHO AO ROOT INDEX: 1.54 CM/M2
ECHO AO SINUS VALSALVA DIAM: 2.6 CM
ECHO AO SINUS VALSALVA INDEX: 1.54 CM/M2
ECHO AV AREA PEAK VELOCITY: 2.1 CM2
ECHO AV AREA VTI: 2.6 CM2
ECHO AV AREA/BSA PEAK VELOCITY: 1.2 CM2/M2
ECHO AV AREA/BSA VTI: 1.5 CM2/M2
ECHO AV CUSP MM: 1.8 CM
ECHO AV MEAN GRADIENT: 8 MMHG
ECHO AV MEAN VELOCITY: 1.3 M/S
ECHO AV PEAK GRADIENT: 17 MMHG
ECHO AV PEAK VELOCITY: 2.1 M/S
ECHO AV VELOCITY RATIO: 0.67
ECHO AV VTI: 34 CM
ECHO BSA: 1.68 M2
ECHO EST RA PRESSURE: 3 MMHG
ECHO LA DIAMETER INDEX: 2.37 CM/M2
ECHO LA DIAMETER: 4 CM
ECHO LA TO AORTIC ROOT RATIO: 1.54
ECHO LA VOL A-L A2C: 51 ML (ref 22–52)
ECHO LA VOL A-L A4C: 59 ML (ref 22–52)
ECHO LA VOL MOD A2C: 49 ML (ref 22–52)
ECHO LA VOL MOD A4C: 56 ML (ref 22–52)
ECHO LA VOLUME AREA LENGTH: 56 ML
ECHO LA VOLUME INDEX A-L A2C: 30 ML/M2 (ref 16–34)
ECHO LA VOLUME INDEX A-L A4C: 35 ML/M2 (ref 16–34)
ECHO LA VOLUME INDEX AREA LENGTH: 33 ML/M2 (ref 16–34)
ECHO LA VOLUME INDEX MOD A2C: 29 ML/M2 (ref 16–34)
ECHO LA VOLUME INDEX MOD A4C: 33 ML/M2 (ref 16–34)
ECHO LV E' LATERAL VELOCITY: 6 CM/S
ECHO LV E' SEPTAL VELOCITY: 6 CM/S
ECHO LV EJECTION FRACTION 3D: 55 %
ECHO LV FRACTIONAL SHORTENING: 36 % (ref 28–44)
ECHO LV INTERNAL DIMENSION DIASTOLE INDEX: 2.13 CM/M2
ECHO LV INTERNAL DIMENSION DIASTOLIC: 3.6 CM (ref 3.9–5.3)
ECHO LV INTERNAL DIMENSION SYSTOLIC INDEX: 1.36 CM/M2
ECHO LV INTERNAL DIMENSION SYSTOLIC: 2.3 CM
ECHO LV ISOVOLUMETRIC RELAXATION TIME (IVRT): 69.2 MS
ECHO LV ISOVOLUMETRIC RELAXATION TIME (IVRT): 87.7 MS
ECHO LV IVSD: 1.4 CM (ref 0.6–0.9)
ECHO LV IVSS: 1.5 CM
ECHO LV MASS 2D: 179.9 G (ref 67–162)
ECHO LV MASS INDEX 2D: 106.4 G/M2 (ref 43–95)
ECHO LV POSTERIOR WALL DIASTOLIC: 1.4 CM (ref 0.6–0.9)
ECHO LV POSTERIOR WALL SYSTOLIC: 1.6 CM
ECHO LV RELATIVE WALL THICKNESS RATIO: 0.78
ECHO LVOT AREA: 3.1 CM2
ECHO LVOT AV VTI INDEX: 0.86
ECHO LVOT DIAM: 2 CM
ECHO LVOT MEAN GRADIENT: 4 MMHG
ECHO LVOT PEAK GRADIENT: 8 MMHG
ECHO LVOT PEAK VELOCITY: 1.4 M/S
ECHO LVOT STROKE VOLUME INDEX: 54.6 ML/M2
ECHO LVOT SV: 92.3 ML
ECHO LVOT VTI: 29.4 CM
ECHO MV "A" WAVE DURATION: 83 MSEC
ECHO MV A VELOCITY: 0.98 M/S
ECHO MV AREA PHT: 3.3 CM2
ECHO MV AREA VTI: 3.5 CM2
ECHO MV E DECELERATION TIME (DT): 239 MS
ECHO MV E VELOCITY: 1.11 M/S
ECHO MV E/A RATIO: 1.13
ECHO MV E/E' LATERAL: 18.5
ECHO MV E/E' RATIO (AVERAGED): 18.5
ECHO MV E/E' SEPTAL: 18.5
ECHO MV LVOT VTI INDEX: 0.9
ECHO MV MAX VELOCITY: 1.3 M/S
ECHO MV MEAN GRADIENT: 3 MMHG
ECHO MV MEAN VELOCITY: 0.9 M/S
ECHO MV PEAK GRADIENT: 6 MMHG
ECHO MV PRESSURE HALF TIME (PHT): 66.5 MS
ECHO MV VTI: 26.4 CM
ECHO PVEIN A DURATION: 73.8 MS
ECHO PVEIN A VELOCITY: 0.2 M/S
ECHO PVEIN PEAK D VELOCITY: 0.5 M/S
ECHO PVEIN PEAK S VELOCITY: 0.6 M/S
ECHO PVEIN S/D RATIO: 1.2
ECHO RIGHT VENTRICULAR SYSTOLIC PRESSURE (RVSP): 39 MMHG
ECHO RV INTERNAL DIMENSION: 3.1 CM
ECHO RV TAPSE: 2 CM (ref 1.7–?)
ECHO TV REGURGITANT MAX VELOCITY: 2.99 M/S
ECHO TV REGURGITANT PEAK GRADIENT: 36 MMHG
EOSINOPHIL # BLD: 0.33 K/UL (ref 0.05–0.5)
EOSINOPHILS RELATIVE PERCENT: 6 % (ref 0–6)
ERYTHROCYTE [DISTWIDTH] IN BLOOD BY AUTOMATED COUNT: 14.7 % (ref 11.5–15)
GFR, ESTIMATED: 7 ML/MIN/1.73M2
GLUCOSE BLD-MCNC: 134 MG/DL (ref 74–99)
GLUCOSE BLD-MCNC: 178 MG/DL (ref 74–99)
GLUCOSE BLD-MCNC: 248 MG/DL (ref 74–99)
GLUCOSE BLD-MCNC: 264 MG/DL (ref 74–99)
GLUCOSE BLD-MCNC: 303 MG/DL (ref 74–99)
GLUCOSE SERPL-MCNC: 528 MG/DL (ref 74–99)
HCG SERPL QL: NEGATIVE
HCT VFR BLD AUTO: 24.7 % (ref 34–48)
HGB BLD-MCNC: 7.9 G/DL (ref 11.5–15.5)
IMM GRANULOCYTES # BLD AUTO: <0.03 K/UL (ref 0–0.58)
IMM GRANULOCYTES NFR BLD: 0 % (ref 0–5)
LYMPHOCYTES NFR BLD: 0.92 K/UL (ref 1.5–4)
LYMPHOCYTES RELATIVE PERCENT: 17 % (ref 20–42)
MAGNESIUM SERPL-MCNC: 1.9 MG/DL (ref 1.6–2.6)
MCH RBC QN AUTO: 29.4 PG (ref 26–35)
MCHC RBC AUTO-ENTMCNC: 32 G/DL (ref 32–34.5)
MCV RBC AUTO: 91.8 FL (ref 80–99.9)
MONOCYTES NFR BLD: 0.47 K/UL (ref 0.1–0.95)
MONOCYTES NFR BLD: 9 % (ref 2–12)
NEUTROPHILS NFR BLD: 67 % (ref 43–80)
NEUTS SEG NFR BLD: 3.62 K/UL (ref 1.8–7.3)
PHOSPHATE SERPL-MCNC: 4.2 MG/DL (ref 2.5–4.5)
PLATELET # BLD AUTO: 238 K/UL (ref 130–450)
PMV BLD AUTO: 9.7 FL (ref 7–12)
POTASSIUM SERPL-SCNC: 4.4 MMOL/L (ref 3.5–5)
PROT SERPL-MCNC: 6.8 G/DL (ref 6.4–8.3)
RBC # BLD AUTO: 2.69 M/UL (ref 3.5–5.5)
SODIUM SERPL-SCNC: 134 MMOL/L (ref 132–146)
WBC OTHER # BLD: 5.4 K/UL (ref 4.5–11.5)

## 2025-03-21 PROCEDURE — 1200000000 HC SEMI PRIVATE

## 2025-03-21 PROCEDURE — 83735 ASSAY OF MAGNESIUM: CPT

## 2025-03-21 PROCEDURE — 6370000000 HC RX 637 (ALT 250 FOR IP): Performed by: INTERNAL MEDICINE

## 2025-03-21 PROCEDURE — 6370000000 HC RX 637 (ALT 250 FOR IP): Performed by: CLINICAL NURSE SPECIALIST

## 2025-03-21 PROCEDURE — 84100 ASSAY OF PHOSPHORUS: CPT

## 2025-03-21 PROCEDURE — 97161 PT EVAL LOW COMPLEX 20 MIN: CPT

## 2025-03-21 PROCEDURE — 99233 SBSQ HOSP IP/OBS HIGH 50: CPT | Performed by: INTERNAL MEDICINE

## 2025-03-21 PROCEDURE — 2500000003 HC RX 250 WO HCPCS: Performed by: CLINICAL NURSE SPECIALIST

## 2025-03-21 PROCEDURE — 99232 SBSQ HOSP IP/OBS MODERATE 35: CPT | Performed by: INTERNAL MEDICINE

## 2025-03-21 PROCEDURE — 93306 TTE W/DOPPLER COMPLETE: CPT

## 2025-03-21 PROCEDURE — 85025 COMPLETE CBC W/AUTO DIFF WBC: CPT

## 2025-03-21 PROCEDURE — 6370000000 HC RX 637 (ALT 250 FOR IP): Performed by: SURGERY

## 2025-03-21 PROCEDURE — 90935 HEMODIALYSIS ONE EVALUATION: CPT

## 2025-03-21 PROCEDURE — 2580000003 HC RX 258: Performed by: SURGERY

## 2025-03-21 PROCEDURE — 6360000002 HC RX W HCPCS: Performed by: SURGERY

## 2025-03-21 PROCEDURE — 6370000000 HC RX 637 (ALT 250 FOR IP): Performed by: STUDENT IN AN ORGANIZED HEALTH CARE EDUCATION/TRAINING PROGRAM

## 2025-03-21 PROCEDURE — 82962 GLUCOSE BLOOD TEST: CPT

## 2025-03-21 PROCEDURE — 80053 COMPREHEN METABOLIC PANEL: CPT

## 2025-03-21 PROCEDURE — 2700000000 HC OXYGEN THERAPY PER DAY

## 2025-03-21 PROCEDURE — 84703 CHORIONIC GONADOTROPIN ASSAY: CPT

## 2025-03-21 PROCEDURE — 97165 OT EVAL LOW COMPLEX 30 MIN: CPT

## 2025-03-21 PROCEDURE — 99232 SBSQ HOSP IP/OBS MODERATE 35: CPT | Performed by: STUDENT IN AN ORGANIZED HEALTH CARE EDUCATION/TRAINING PROGRAM

## 2025-03-21 PROCEDURE — 93306 TTE W/DOPPLER COMPLETE: CPT | Performed by: INTERNAL MEDICINE

## 2025-03-21 PROCEDURE — 2500000003 HC RX 250 WO HCPCS: Performed by: SURGERY

## 2025-03-21 RX ORDER — REGADENOSON 0.08 MG/ML
0.4 INJECTION, SOLUTION INTRAVENOUS
Status: DISCONTINUED | OUTPATIENT
Start: 2025-03-21 | End: 2025-03-21

## 2025-03-21 RX ADMIN — INSULIN LISPRO 2 UNITS: 100 INJECTION, SOLUTION INTRAVENOUS; SUBCUTANEOUS at 13:56

## 2025-03-21 RX ADMIN — HYDROCODONE BITARTRATE AND ACETAMINOPHEN 1 TABLET: 5; 325 TABLET ORAL at 17:49

## 2025-03-21 RX ADMIN — DIPHENHYDRAMINE HCL 25 MG: 25 TABLET ORAL at 21:32

## 2025-03-21 RX ADMIN — CARVEDILOL 25 MG: 25 TABLET, FILM COATED ORAL at 17:35

## 2025-03-21 RX ADMIN — SODIUM CHLORIDE, PRESERVATIVE FREE 10 ML: 5 INJECTION INTRAVENOUS at 21:41

## 2025-03-21 RX ADMIN — HYDROMORPHONE HYDROCHLORIDE 0.2 MG: 1 INJECTION, SOLUTION INTRAMUSCULAR; INTRAVENOUS; SUBCUTANEOUS at 21:32

## 2025-03-21 RX ADMIN — CARVEDILOL 25 MG: 25 TABLET, FILM COATED ORAL at 13:18

## 2025-03-21 RX ADMIN — INSULIN LISPRO 1 UNITS: 100 INJECTION, SOLUTION INTRAVENOUS; SUBCUTANEOUS at 17:37

## 2025-03-21 RX ADMIN — SODIUM CHLORIDE, PRESERVATIVE FREE 10 ML: 5 INJECTION INTRAVENOUS at 21:24

## 2025-03-21 RX ADMIN — INSULIN LISPRO 3 UNITS: 100 INJECTION, SOLUTION INTRAVENOUS; SUBCUTANEOUS at 17:38

## 2025-03-21 RX ADMIN — EPOETIN ALFA-EPBX 10000 UNITS: 10000 INJECTION, SOLUTION INTRAVENOUS; SUBCUTANEOUS at 17:35

## 2025-03-21 RX ADMIN — INSULIN GLARGINE 9 UNITS: 100 INJECTION, SOLUTION SUBCUTANEOUS at 13:55

## 2025-03-21 RX ADMIN — INSULIN LISPRO 4 UNITS: 100 INJECTION, SOLUTION INTRAVENOUS; SUBCUTANEOUS at 06:07

## 2025-03-21 RX ADMIN — HYDROMORPHONE HYDROCHLORIDE 0.2 MG: 1 INJECTION, SOLUTION INTRAMUSCULAR; INTRAVENOUS; SUBCUTANEOUS at 12:37

## 2025-03-21 RX ADMIN — Medication 3 MG: at 21:32

## 2025-03-21 RX ADMIN — HYDROCODONE BITARTRATE AND ACETAMINOPHEN 1 TABLET: 5; 325 TABLET ORAL at 03:15

## 2025-03-21 RX ADMIN — INSULIN LISPRO 3 UNITS: 100 INJECTION, SOLUTION INTRAVENOUS; SUBCUTANEOUS at 13:58

## 2025-03-21 RX ADMIN — SODIUM CHLORIDE, PRESERVATIVE FREE 10 ML: 5 INJECTION INTRAVENOUS at 13:18

## 2025-03-21 RX ADMIN — SODIUM CHLORIDE, PRESERVATIVE FREE 40 MG: 5 INJECTION INTRAVENOUS at 13:17

## 2025-03-21 ASSESSMENT — PAIN DESCRIPTION - DESCRIPTORS
DESCRIPTORS: DISCOMFORT;DULL;THROBBING
DESCRIPTORS: ACHING
DESCRIPTORS: ACHING

## 2025-03-21 ASSESSMENT — PAIN DESCRIPTION - ORIENTATION
ORIENTATION: LEFT

## 2025-03-21 ASSESSMENT — PAIN SCALES - WONG BAKER: WONGBAKER_NUMERICALRESPONSE: HURTS A LITTLE BIT

## 2025-03-21 ASSESSMENT — PAIN SCALES - GENERAL
PAINLEVEL_OUTOF10: 7
PAINLEVEL_OUTOF10: 8
PAINLEVEL_OUTOF10: 6
PAINLEVEL_OUTOF10: 9
PAINLEVEL_OUTOF10: 4
PAINLEVEL_OUTOF10: 3
PAINLEVEL_OUTOF10: 10
PAINLEVEL_OUTOF10: 10

## 2025-03-21 ASSESSMENT — PAIN DESCRIPTION - LOCATION
LOCATION: ANKLE
LOCATION: KNEE;ANKLE

## 2025-03-21 NOTE — PROGRESS NOTES
Physical Therapy  Initial Assessment     Name: Michelle Nettles  : 1985  MRN: [id number]      Date of Service: 3/21/2025    Evaluating PT: Sheldon Flaherty, PT, DPT [id number]      Room #:  0728/0728-A  Diagnosis:  Acute on chronic anemia [D64.9]  Severe anemia [D64.9]  PMHx/PSHx:   has a past medical history of JOLLY (acute kidney injury), Anemia, AVF (arteriovenous fistula), Cellulitis, face, CHF (congestive heart failure) ([facility]), Chronic kidney disease, Chronic respiratory failure with hypoxia ([facility]), Dialysis AV fistula malfunction, initial encounter, Displaced trimalleolar fracture of left lower leg, closed, initial encounter, DM type 1 (diabetes mellitus, type 1) ([facility]), Encounter regarding vascular access for dialysis for ESRD ([facility]), ESRD (end stage renal disease) ([facility]), Hemodialysis patient, Hidradenitis suppurativa, Hypercalcemia, Hyperkalemia, Hypertension, Iron deficiency anemia secondary to blood loss (chronic), Other acute gastritis without hemorrhage, Tobacco abuse, Type 2 diabetes mellitus with hyperglycemia ([facility]), and Vitamin B-complex deficiency.  Pertinent Information:  Left ankle ORIF 3/7  Precautions:  Fall risk, NWB LLE, HD  Equipment Needs:  NA    SUBJECTIVE:    Pt was admitted from [facility]. Pt was completing stand pivot transfers without hands on assist. Pt was used WC as means of mobility.    OBJECTIVE:   Initial Evaluation  Date: 3/21/25 Treatment Date: Short Term/ Long Term   Goals   AM-PAC 6 Clicks      Was pt agreeable to Eval/treatment? Yes     Does pt have pain? L knee pain     Bed Mobility  Rolling: NT  Supine to sit: SBA  Sit to supine: NT  Scooting: SBA  Rolling: Independent   Supine to sit: Independent   Sit to supine: Independent   Scooting: Independent    Transfers Sit to stand: Dariela  Stand to sit: Dariela  Stand pivot: Dariela with WW  Sit to stand: Independent   Stand to sit: Independent   Stand pivot: Supervision with WW   Ambulation   2 feet to/from chair with WW with Dariela

## 2025-03-21 NOTE — PROGRESS NOTES
Inpatient Cardiology Consultation      Reason for Consult:  Chest Pain     Consulting Physician: Dr Forman     Requesting Physician:  Dr. Salcido     Date of Consultation: 3/21/2025    HISTORY OF PRESENT ILLNESS:   Michelle Nettles  is a 39 y.o.  female known to Dr Cruz seen inpatient 7/2024 for hypervolemia after missing dialysis    Interim:  CTA canceled as patient already had hemodialysis  Stress test also canceled due to drop in hemoglobin  Once hemoglobin stabilizes arrange for Lexiscan myocardial perfusion stress test prior to discharge    Past Medical History:    Chronically elevated troponin range 107-128  History pericardial effusion with window by Dr. Mckay 8/2016  Hypertension / LVH  Type 2 diabetes, insulin requiring  A1c 5.9 3/3/2025  Low HDL; not on statin  Lipid profile 3/3/2025 cholesterol total 112 HDL 30 LDL 62 triglycerides 101 VLDL 20  Obesity BMI 32  ESRD on HD Monday Wednesday Friday  Left arm AV fistula  No longer makes urine  Chronic multifactorial anemia with Fe Def  Known to the [facility]   Recurrent GI Bleed  Status post transfusions  EGD 1/2025 gastritis and Schatzki's ring.   EGD 3/5/25 mild distal esophagitis   Colonoscopy 3/18/25 mild diverticulosis.  Scattered diverticula with external and internal hemorrhoids   Meckels scan, Tagged RBC, CT, RP US scans also negative for bleeding 3/2025  Chronic hypoxic respiratory failure on home O2 since COVID Pneumonia 3/2024  VQ scan 3/4/2025 negative  Former tobacco abuse, 1 packs every 2-3 weeks, quit 2/2025  Former marijuana use  ?? Hypothyroidism - not on supplement  TSH 3/3/2025 was 10.62  History left closed trimalleolar ankle fracture dislocation s/p application of external fixator and closed treatment 2/15/2025 >> removal of external fixator and status post ORIF 3/7/2025  Hidradenitis suppurativa   Birth control: Depo-Provera shot    Cardiology testing:  TTE 10/2016: EF 60%.  Mild concentric LVH.  Normal RV size and  chair   HEENT:   Head- Normocephalic, atraumatic   Eyes- Conjunctivae pink  Face - ? Swelling   Throat- Oral mucosa pink and moist  Neck-  Thick No stridor, trachea midline, no jugular venous distention. No hepatojugular reflex  CHEST: Chest symmetrical and non-tender to palpation. No accessory muscle use or intercostal retractions  RESPIRATORY: Lung sounds - clear throughout fields   CARDIOVASCULAR:     No carotid bruit  Heart Inspection- shows no noted pulsations  Heart Palpation- no heaves or thrills  Heart Ausculation- Regular rate and rhythm, no murmur. No s3, s4 or rub   PV: No R lower extremity edema. / LLE cast / LUE AV Shunt + bruit / thrill / R Pedal pulses palpable  ABDOMEN: Soft, obese non-tender to light palpation. Bowel sounds present.   MS: Good muscle strength and tone. No atrophy or abnormal movements.   : Deferred  SKIN: Warm and dry    NEURO / PSYCH: Oriented to person, place and time. Speech clear and appropriate. Follows all commands. Pleasant affect     DATA:    ECG:reviewed with Dr Forman     Tele strips: NSR 90's     Diagnostic:  See above      Wt Readings from Last 3 Encounters:   03/21/25 75.5 kg (166 lb 7.2 oz)   03/10/25 71 kg (156 lb 8.4 oz)   02/21/25 71.5 kg (157 lb 10.1 oz)         Intake/Output Summary (Last 24 hours) at 3/21/2025 1959  Last data filed at 3/21/2025 1108  Gross per 24 hour   Intake 300 ml   Output 2300 ml   Net -2000 ml       Labs:   CBC:   Recent Labs     03/20/25  1250 03/21/25  0438   WBC 5.6 5.4   HGB 7.9* 7.9*   HCT 24.6* 24.7*    238     BMP:   Recent Labs     03/20/25  1250 03/21/25  0438    134   K 3.9 4.4   CO2 28 24   BUN 13 17   CREATININE 6.0* 7.2*   LABGLOM 9* 7*   CALCIUM 8.9 8.6     Mag:   Recent Labs     03/20/25  1250 03/21/25  0438   MG 2.1 1.9     Phos:   Recent Labs     03/20/25  1250 03/21/25  0438   PHOS 3.3 4.2     TSH:   Lab Results   Component Value Date    TSH 3.71 03/20/2025       HgA1c:   Lab Results   Component Value Date     LABA1C 5.9 (H) 03/03/2025       PRO-BNP:   Lab Results   Component Value Date    PROBNP 8,415 (H) 03/13/2025    PROBNP 13,377 (H) 01/10/2025    PROBNP 25,526 (H) 04/02/2024    PROBNP 21,502 (H) 01/30/2024    PROBNP 22,902 (H) 07/11/2023    PROBNP 21,203 (H) 06/04/2023    PROBNP 24,924 (H) 06/03/2023    PROBNP 31,843 (H) 03/22/2023    PROBNP 51,169 (H) 07/28/2022    PROBNP 34,981 (H) 03/17/2022       HS TROP:  Lab Results   Component Value Date/Time    TROPHS 162 03/20/2025 10:50 AM    TROPHS 107 02/14/2025 03:55 PM    TROPHS 110 02/14/2025 01:42 PM    TROPHS 118 01/10/2025 11:07 AM    TROPHS 128 01/09/2025 11:49 PM    TROPHS 128 07/16/2024 08:30 PM    TROPHS 118 06/06/2024 09:19 PM    TROPHS 123 06/06/2024 06:57 PM    TROPHS 158 04/02/2024 11:30 AM    TROPHS 182 03/22/2024 03:36 PM     LIPID PANEL:  Lab Results   Component Value Date    CHOL 112 03/03/2025    TRIG 101 03/03/2025    HDL 30 (L) 03/03/2025    LDL 62 03/03/2025    VLDL 20 03/03/2025       LIVER PROFILE:  Recent Labs     03/20/25  1250 03/21/25  0438   AST 14 17   ALT <5 <5             EKG March 2025 revealed sinus tachycardia 102 bpm anterior septal Q waves and poor R wave progression low voltage    Assessment  Chest pain  Chronic elevated cardiac  Elevated BNP  SOB  VERA  Nausea   Acute on chronic Anemia  Chronically elevated troponin range 107-128  History pericardial fusion with window by Dr. Mckay 8/2016  Hypertension / LVH  Type 2 diabetes, insulin requiring  A1c 5.9 3/3/2025  Low HDL  Lipid profile 3/3/2025 cholesterol total 112 HDL 30 LDL 62 triglycerides 101 VLDL 20  Obesity BMI 32  ESRD on HD Monday Wednesday Friday  Left arm AV fistula  Chronic multifactorial anemia with Fe Def  [facility]   Recurrent GI Bleed  Status post transfusions  EGD 1/2025 gastritis and Schatzki's ring.   EGD 3/5/25 mild distal esophagitis   Colonoscopy 3/18/25 mild diverticulosis.  Scattered diverticula with external and internal hemorrhoids   Meckels

## 2025-03-21 NOTE — PROGRESS NOTES
Occupational Therapy    OCCUPATIONAL THERAPY INITIAL EVALUATION    [facility]   [address], [place]         Date:3/21/2025                                                  Patient Name: Michelle Nettles    MRN: [id number]    : 1985    Room: 28/0728-      Evaluating OT: Nisha Cameron OTR/L   [id number]      Referring Provider:Rachael Salcido MD     Specific Provider Orders/Date:OT eval and treat 3/2025      Diagnosis:  Acute on chronic anemia [D64.9]  Severe anemia [D64.9]     Pertinent Medical History: CHF, ESRD, dialysis, L ankle ORIF 3/7/2025     Precautions:  Fall Risk, NWB L LE, O2      Assessment of current deficits    [x] Functional mobility  [x]ADLs  [x] Strength               []Cognition    [x] Functional transfers   [x] IADLs         [x] Safety Awareness   [x]Endurance    [] Fine Coordination              [x] Balance      [] Vision/perception   []Sensation     []Gross Motor Coordination  [] ROM  [] Delirium                   [] Motor Control     OT PLAN OF CARE   OT POC based on physician orders, patient diagnosis and results of clinical assessment    Frequency/Duration  2-3 days/wk for 2 - 4weeks PRN   Specific OT Treatment Interventions to include:   ADL retraining/adapted techniques and AE recommendations to increase functional independence within precautions                    Energy conservation techniques to improve tolerance for selfcare routine   Functional transfer/mobility training/DME recommendations for increased independence, safety and fall prevention         Patient/family education to increase safety and functional independence             Environmental modifications for safe mobility and completion of ADLs                             Therapeutic activity to improve functional performance during ADLs.                                         Therapeutic exercise to improve tolerance and functional strength for  ADLs    Balance retraining/tolerance tasks for facilitation of postural control with dynamic challenges during ADLs .      Positioning to improve functional independence        Recommended Adaptive Equipment: wheelchair     SOCIAL:  patient admitted from [facility] , using wheelchair for mobility, SPT   Home Living: Pt lives with 11 year old daughter, 1 story      Prior Level of Function: Independent  with ADLs , and  with IADLs; ambulated with no device     Pain Level: L knee ;   Cognition: A&O:  4/4   Memory:  good    Sequencing:  good    Problem solving:  fair +    Judgement/safety:  fair+      Functional Assessment:  AM-PAC Daily Activity Raw Score: 16/24   Initial Eval Status  Date: 3/21/2025 Treatment Status  Date: STGs = LTGs  Time frame: 10-14 days   Feeding Independent     Grooming Set-up ,seated   Independent   UB Dressing Set-up  Independent    LB Dressing Min A  Able to don R sock while seated EOB   Supervision    Bathing Min A   Supervision    Toileting SBA   Independent    Bed Mobility  SBA  Supine <> sit   Independent   Functional Transfers Min A  Sit-stand from bed   Supervision    Functional Mobility PTA:  using wheelchair   Independent at wheelchair level    Balance Sitting:     Static:  Independent    Dynamic:supervision   Standing: SBA   Independent/supervisiom    Activity Tolerance No SOB observed   O2  Good  with ADL activity    Visual/  Perceptual Glasses: none observed         UE ROM/strength  AROM present throughout   Tolerate UE therapeutic activity/exercises to increase strength/endurance for ADL/xfer activity       Hand Dominance right    Hearing: WFL   Sensation:  No c/o numbness or tingling   Tone: WFL   Edema: none observed     Comments: Upon arrival patient lying in bed .  At end of session, patient sitting EOB, L UE elevated  with call light and phone within reach, all lines and tubes intact.     Overall patient demonstrated  decreased independence and safety during completion of

## 2025-03-21 NOTE — PROGRESS NOTES
.     Low Risk Nutrition Note     Reason for Visit:   Length of Stay    Nutrition Assessment:  ADM from [facility] for anemia. Recent dc from [facility] s/p N, V, D s/p gastritis & Schatzki's ring of distal esophagus 3/3. PMH: CHF,Uncontrolled DM ESRD, dialysis, L ankle fx-ex fix application 2/15-removal 3/7, Tobacco Dependent. C-scope-Meckels scan, negative. Consuming >75% meals. Will monitor.     Current Nutrition Therapies:    ADULT DIET; Regular; 4 carb choices (60 gm/meal); Low Potassium (Less than 3000 mg/day)    Anthropometrics:   Current Height: 149.9 cm (4' 11\")  Current Weight - Scale: 73.4 kg (161 lb 13.1 oz)      Monitoring and Evaluation:  No nutrition diagnosis. Patient will be monitored per nutrition standards of care.     Consult Dietitian if nutrition intervention essential to patient care is needed.     Discharge Planning:  No needs    Alem Euceda RD

## 2025-03-21 NOTE — FLOWSHEET NOTE
03/21/25 1108   Vital Signs   BP (!) 155/75   Temp 98 °F (36.7 °C)   Pulse 97   Respirations 16   Weight - Scale 69.5 kg (153 lb 3.5 oz)   Weight Method Bed scale   Percent Weight Change -2.8   Pain Assessment   Pain Assessment None - Denies Pain   Post-Hemodialysis Assessment   Post-Treatment Procedures Blood returned;Access bleeding time < 10 minutes   Machine Disinfection Process Exterior Machine Disinfection   Blood Volume Processed (Liters) 79.9 L   Dialyzer Clearance Lightly streaked   Duration of Treatment (minutes) 210 minutes   Hemodialysis Intake (ml) 300 ml   Hemodialysis Output (ml) 2300 ml   NET Removed (ml) 2000   Tolerated Treatment Good   RUE Edema Trace   LUE Edema Trace   RLE Edema +1   LLE Edema +1   Time Off 1053   Patient Disposition Return to room   Observations & Evaluations   Level of Consciousness 0   Heart Rhythm Regular   Respiratory Quality/Effort Unlabored   O2 Device None (Room air)   Skin Color Pink   Skin Condition/Temp Dry;Warm   Abdomen Inspection Soft   Bowel Sounds (All Quadrants) Active

## 2025-03-21 NOTE — PLAN OF CARE
Problem: Chronic Conditions and Co-morbidities  Goal: Patient's chronic conditions and co-morbidity symptoms are monitored and maintained or improved  3/21/2025 1539 by Malik Page RN  Outcome: Progressing  3/21/2025 0208 by Peterson Mojica, RN  Outcome: Progressing     Problem: Discharge Planning  Goal: Discharge to home or other facility with appropriate resources  3/21/2025 1539 by Malik Page RN  Outcome: Progressing  3/21/2025 0208 by Peterson Mojica, RN  Outcome: Progressing     Problem: Pain  Goal: Verbalizes/displays adequate comfort level or baseline comfort level  3/21/2025 1539 by Malik Page RN  Outcome: Progressing  3/21/2025 0208 by Peterson Mojica, RN  Outcome: Progressing     Problem: Safety - Adult  Goal: Free from fall injury  3/21/2025 1539 by Malik Page RN  Outcome: Progressing  3/21/2025 0208 by Peterson Mojica, RN  Outcome: Progressing

## 2025-03-21 NOTE — PROGRESS NOTES
ENDOCRINOLOGY PROGRESS NOTE      Date of admission: 3/13/2025  Date of service: 3/20/2025  Admitting physician: Jorge Feliz DO   Primary Care Physician: Rogers Rodríguez MD  Consultant physician: Brennan Dyer MD     Reason for the consultation:  Uncontrolled DM    History of Present Illness:  The history is provided by the patient. Accuracy of the patient data is excellent    Michelle Nettles is a very pleasant 39 y.o. old female with recurrent anemia , ERSD, recent trimalleolar fracture, poorly controlled type 1 diabetes, hidradenitis suppurative admitted to [facility] from nursing facility on 3/13/2025 because of fatigue, endocrine service was consulted for uncontrolled diabetes management.    Subjective  The patient was seen and examined at bedside this afternoon, no acute events overnight.  Appetite is not very good.    Inpatient diet:   Carb Restricted diet     Point of care glucose monitoring   (Independently reviewed)   Recent Labs     03/19/25  1207 03/19/25  1656 03/19/25  2006 03/20/25  0747 03/20/25  1158 03/20/25  1626 03/20/25  1929 03/20/25 2013   POCGLU 236* 325* 273* 241* 148* 101* 54* 66*     Allergy and drug reactions:   Allergies   Allergen Reactions    Vancomycin Shortness Of Breath and Itching       Scheduled Meds:   sodium chloride flush  5-40 mL IntraVENous 2 times per day    carvedilol  25 mg Oral BID WC    [START ON 3/21/2025] insulin glargine  9 Units SubCUTAneous QAM    [START ON 3/21/2025] insulin lispro  3 Units SubCUTAneous TID WC    pantoprazole (PROTONIX) 40 mg in sodium chloride (PF) 0.9 % 10 mL injection  40 mg IntraVENous Daily    sodium chloride flush  5-40 mL IntraVENous 2 times per day    insulin lispro  0-4 Units SubCUTAneous 4x Daily AC & HS    epoetin nadya-epbx  10,000 Units SubCUTAneous Once per day on Monday Wednesday Friday       PRN Meds:   metoprolol tartrate, 50 mg, PRN   Or  metoprolol tartrate, 100 mg, PRN   Or  metoprolol tartrate, 150 mg, PRN  nitroGLYCERIN,  kidney.   4. The urinary bladder is collapsed and is otherwise normal in appearance.         XR CHEST (2 VW)   Final Result      XR CHEST PORTABLE   Final Result   1. Limited exam, suspect opacification of right and possibly left side as   discussed above   2. Borderline cardiomegaly   3. Possible subglottic tracheal narrowing      RECOMMENDATION:   Clinical correlation.  PA/lateral chest x-rays or CT to further evaluate is   suggested.         CTA CORON EJECT FRAC WALL MOTION    (Results Pending)       Medical Records/Labs/Images review:   I personally reviewed and summarized previous records   All labs and imaging were reviewed independently     ASSESSMENT & PLAN   Michelle ORDONEZ Lencho Nettles, a 39 y.o.-old female seen today for inpatient diabetes management    Diabetes Mellitus type 1  Patient was diagnosed with type 1 diabetes at age 5.  Patient's diabetes is uncontrolled   We recommend the following DM regimen  Change Lantus to 9 units daily in the morning  Decrease the Humalog to 3 units 3 times daily with meals  Low dose sliding scale   Continue glucose check with meals and at bedtime  Will titrate insulin dose based on the blood glucose trend & insulin requirement  Upon discharge, I will arrange for the patient to be seen in the endocrinology clinic for routine diabetes maintenance and prevention    Dietary noncompliance  Discussed with patient the importance of eating consistent carbohydrate meals, avoiding high glycemic index food. Also, discussed with patient the risk and negative consequences of dietary noncompliance on blood glucose control, blood pressure and weight    Acute on chronic severe anemia:  Patient had multiple EGDs, negative colonoscopy, Neg Meckel's scan.   Plan for pill endoscopy.   General surgery is following    ESRD on dialysis  GERD    Interdisciplinary plan for communication with healthcare providers:   Consult recommendations were discussed with the Primary Service/Nursing staff

## 2025-03-21 NOTE — CARE COORDINATION
Updated plan of care. HD done this am. Cardio wants coronary CTA-unable to do. Pending therapies. HD approved at [facility]. Pt accepted and can discharge over weekend to [facility]. Wheelchair transport form done, SYLVIE initiated. Will follow-mjo

## 2025-03-21 NOTE — DISCHARGE INSTR - DIET

## 2025-03-21 NOTE — PROGRESS NOTES
Subjective:  Chart reviewed  Glucose >500 today  HD today  H/H stable remains low  Patient currently having an echocardiogram completed  Denies chest pain or pressure, N/V, D/C or evidence of bleeding/blood loss    Objective:    BP (!) 142/62   Pulse (!) 103   Temp 98.6 °F (37 °C) (Oral)   Resp 18   Ht 1.499 m (4' 11\")   Wt 73.4 kg (161 lb 13.1 oz)   LMP  (LMP Unknown)   SpO2 100%   BMI 32.68 kg/m²     General: NAD  HEENT: No thrush or mucositis, EOMI, facial fullness  Heart: tachycardic  Lungs: Respirations are easy and not labored   abd:  nontender  Extrem: Left lower extremity dressing   Skin: Intact, no petechia or purpura    CBC with Differential:    Lab Results   Component Value Date/Time    WBC 5.4 03/21/2025 04:38 AM    RBC 2.69 03/21/2025 04:38 AM    RBC 2.92 02/22/2022 08:11 PM    HGB 7.9 03/21/2025 04:38 AM    HCT 24.7 03/21/2025 04:38 AM     03/21/2025 04:38 AM    MCV 91.8 03/21/2025 04:38 AM    MCH 29.4 03/21/2025 04:38 AM    MCHC 32.0 03/21/2025 04:38 AM    RDW 14.7 03/21/2025 04:38 AM    LYMPHOPCT 17 03/21/2025 04:38 AM    MONOPCT 9 03/21/2025 04:38 AM    MYELOPCT 0.9 03/24/2023 05:15 AM    EOSPCT 6 03/21/2025 04:38 AM    BASOPCT 0 03/21/2025 04:38 AM    MONOSABS 0.47 03/21/2025 04:38 AM    LYMPHSABS 0.92 03/21/2025 04:38 AM    EOSABS 0.33 03/21/2025 04:38 AM    BASOSABS 0.02 03/21/2025 04:38 AM     CMP:    Lab Results   Component Value Date/Time     03/21/2025 04:38 AM    K 4.4 03/21/2025 04:38 AM    K 5.2 06/30/2023 05:52 AM    CL 96 03/21/2025 04:38 AM    CO2 24 03/21/2025 04:38 AM    BUN 17 03/21/2025 04:38 AM    CREATININE 7.2 03/21/2025 04:38 AM    GFRAA 5 07/29/2022 01:14 AM    LABGLOM 7 03/21/2025 04:38 AM    LABGLOM 8 04/02/2024 11:30 AM    GLUCOSE 528 03/21/2025 04:38 AM    GLUCOSE 279 07/27/2011 04:40 PM    CALCIUM 8.6 03/21/2025 04:38 AM    BILITOT 0.2 03/21/2025 04:38 AM    ALKPHOS 117 03/21/2025 04:38 AM    AST 17 03/21/2025 04:38 AM    ALT <5 03/21/2025 04:38 AM                Current Facility-Administered Medications:     metoprolol tartrate (LOPRESSOR) tablet 50 mg, 50 mg, Oral, PRN **OR** metoprolol tartrate (LOPRESSOR) tablet 100 mg, 100 mg, Oral, PRN **OR** metoprolol tartrate (LOPRESSOR) tablet 150 mg, 150 mg, Oral, PRN, Krystina Anderson, APRN - CNS    nitroGLYCERIN (NITROSTAT) SL tablet 0.4 mg, 0.4 mg, SubLINGual, PRN **OR** nitroGLYCERIN (NITROSTAT) SL tablet 0.8 mg, 0.8 mg, SubLINGual, PRN, Krystina Anderson, KEMI - CNS    sodium chloride flush 0.9 % injection 5-40 mL, 5-40 mL, IntraVENous, 2 times per day, Krystina Anderson, KEMI - CNS    sodium chloride flush 0.9 % injection 5-40 mL, 5-40 mL, IntraVENous, PRN, Krystina Anderson, KEMI - CNS    0.9 % sodium chloride infusion, , IntraVENous, PRN, Krystina Anderson, APRN - CNS    carvedilol (COREG) tablet 25 mg, 25 mg, Oral, BID , Krystina Anderson, KEMI - BEE, 25 mg at 03/21/25 1318    insulin glargine (LANTUS) injection vial 9 Units, 9 Units, SubCUTAneous, QAGomez Milad Saleh, MD    insulin lispro (HUMALOG,ADMELOG) injection vial 3 Units, 3 Units, SubCUTAneous, TID Gomez Milad Saleh, MD    HYDROcodone-acetaminophen (NORCO) 5-325 MG per tablet 1 tablet, 1 tablet, Oral, Q6H PRN, Rachael Salcido MD, 1 tablet at 03/21/25 0315    pantoprazole (PROTONIX) 40 mg in sodium chloride (PF) 0.9 % 10 mL injection, 40 mg, IntraVENous, Daily, Madhu Cordova MD, 40 mg at 03/21/25 1317    calcium carbonate (TUMS) chewable tablet 1,000 mg, 1,000 mg, Oral, TID PRN, Madhu Cordova MD, 1,000 mg at 03/20/25 1028    albuterol (PROVENTIL) (2.5 MG/3ML) 0.083% nebulizer solution 5 mg, 5 mg, Nebulization, 4x Daily PRN, Madhu Cordova MD    bisacodyl (DULCOLAX) suppository 10 mg, 10 mg, Rectal, Daily PRN, Madhu Codrova MD    diphenhydrAMINE (BENADRYL) tablet 25 mg, 25 mg, Oral, Q6H PRN, Madhu Cordova MD, 25 mg at 03/20/25 0900    sodium chloride flush 0.9 % injection 5-40 mL, 5-40 mL, IntraVENous, 2 times per  day, Madhu Cordova MD, 10 mL at 03/21/25 1318    sodium chloride flush 0.9 % injection 5-40 mL, 5-40 mL, IntraVENous, PRN, Madhu Cordova MD    0.9 % sodium chloride infusion, , IntraVENous, PRN, Madhu Cordova MD    ondansetron (ZOFRAN-ODT) disintegrating tablet 4 mg, 4 mg, Oral, Q8H PRN, 4 mg at 03/14/25 1604 **OR** ondansetron (ZOFRAN) injection 4 mg, 4 mg, IntraVENous, Q6H PRN, Madhu Cordova MD, 4 mg at 03/20/25 1812    melatonin tablet 3 mg, 3 mg, Oral, Nightly PRN, Madhu Cordova MD    acetaminophen (TYLENOL) tablet 650 mg, 650 mg, Oral, Q6H PRN **OR** acetaminophen (TYLENOL) suppository 650 mg, 650 mg, Rectal, Q6H PRN, Madhu Cordova MD    nicotine polacrilex (COMMIT) lozenge 2 mg, 2 mg, Oral, Q1H PRN, Madhu Cordova MD    insulin lispro (HUMALOG,ADMELOG) injection vial 0-4 Units, 0-4 Units, SubCUTAneous, 4x Daily AC & HS, Madhu Cordova MD, 4 Units at 03/21/25 0607    glucose chewable tablet 16 g, 4 tablet, Oral, PRN, Madhu Cordova MD    dextrose bolus 10% 125 mL, 125 mL, IntraVENous, PRN **OR** dextrose bolus 10% 250 mL, 250 mL, IntraVENous, PRN, Madhu Cordova MD    glucagon injection 1 mg, 1 mg, SubCUTAneous, PRN, Madhu Cordova MD, 1 mg at 03/17/25 0730    dextrose 10 % infusion, , IntraVENous, Continuous PRN, Madhu Cordova MD    EPINEPHrine PF 1 MG/ML injection 0.3 mg, 0.3 mg, IntraMUSCular, PRN, Madhu Cordova MD    HYDROmorphone (DILAUDID) injection 0.2 mg, 0.2 mg, IntraVENous, Q4H PRN, Madhu Cordova MD, 0.2 mg at 03/21/25 1237    epoetin nadya-epbx (RETACRIT) injection 10,000 Units, 10,000 Units, SubCUTAneous, Once per day on Monday Wednesday Friday, Madhu Cordova MD, 10,000 Units at 03/19/25 1732    0.9 % sodium chloride infusion, , IntraVENous, PRN, Madhu Cordova MD    Assessment:    Principal Problem:    Anemia  Active Problems:    Chronic systolic (congestive) heart failure    Hidradenitis suppurativa    History of venous thromboembolism    MRSA colonization    ESRD needing

## 2025-03-21 NOTE — PROGRESS NOTES
[facility]   Dialysis Progress Note    Michelle Nettles  03/21/25  1:46 PM    Reason for Consult: ESRD needing dialysis    HISTORY OF PRESENT ILLNESS from the 3/14/25 note:     Michelle Nettles is a 39 y.o. female with a past medical history of anemia of stage V CKD, recent left trimalleolar fracture, ESRD, poorly controlled IDDM, chronic HFpEF, & hidradenitis suppurativa who presented to the ED from [facility] for fatigue with low Hgb.  Patient recently hospitalized at [facility] after presenting with N/V/D on 3/3/2024.  Hgb was 5.2.  S/p repeat EGD showed gastritis & Schatzki's ring but no source of bleeding.  S/p explantation of left ankle hardware.  Patient is frustrated she had to return to the hospital once again.  She is conversationally dyspneic.  Over the past 2 days her face has been swelling up.  Was called to the bedside out of concern for transfusion reaction.  Held transfusion for now.  Gave Benadryl, Pepcid and 1 dose of IV Solu-Medrol. .     INTERVAL HX:    3/19/25: Pt awake alert and appropriate. She was seen in dialysis earlier today and was tolerating the procedure well. No c/o abd pain or CP    3/20: Resting comfortably in bed. Notes some increased facial swelling otherwise no new issues reported this AM.    3/21: Patient seen and examined toward end of dialysis treatment.  Procedure well-tolerated.  Spoke with dialysis nurse, prescription reviewed.  No significant complaints.  Tolerated 2 L of ultrafiltration without issue.  Blood pressure slightly above target but otherwise no chest pain or shortness of breath noted.  Blood sugar remains elevated.    Past Medical History:   Past Medical History:   Diagnosis Date    JOLLY (acute kidney injury) 04/05/2016    Anemia     AVF (arteriovenous fistula) 02/19/2018    let arm    Cellulitis, face     CHF (congestive heart failure) (HCC)     Chronic kidney disease     Chronic respiratory failure with hypoxia (HCC)

## 2025-03-21 NOTE — PROGRESS NOTES
Remote Patient Order Discontinued    Received request from Taras Khan RN   to discontinue order for remote patient monitoring of Kidney Disease , CHF, Diabetes, and hypotension and order completed.

## 2025-03-21 NOTE — CARE COORDINATION
Patient Michelle Nettles  03/21/25     Care Coordination  placed call to patient to arrange RPM kit  through [employer]. Left HIPAA Compliant Message     provided return and how to pack equipment in original packing via the patients voicemail if available and provided call back number should patient have questions.    Patient made aware [employer] will  equipment in 2-4 days.

## 2025-03-21 NOTE — PLAN OF CARE
Problem: Chronic Conditions and Co-morbidities  Goal: Patient's chronic conditions and co-morbidity symptoms are monitored and maintained or improved  3/21/2025 0208 by Peterson Mojica, RN  Outcome: Progressing     Problem: Discharge Planning  Goal: Discharge to home or other facility with appropriate resources  3/21/2025 0208 by Peterson Mojica, RN  Outcome: Progressing     Problem: Pain  Goal: Verbalizes/displays adequate comfort level or baseline comfort level  3/21/2025 0208 by Peterson Mojica, RN  Outcome: Progressing     Problem: Safety - Adult  Goal: Free from fall injury  3/21/2025 0208 by Peterson Mojica, RN  Outcome: Progressing

## 2025-03-21 NOTE — PROGRESS NOTES
[facility] Hospitalist Progress Note    Admitting Date and Time: 3/13/2025  8:02 PM  Admit Dx: Acute on chronic anemia [D64.9]  Severe anemia [D64.9]    Subjective:  Patient is being followed for Acute on chronic anemia [D64.9]  Severe anemia [D64.9]   Pt feels better, blood glucose high, getting HD today.  Per RN: no major concerns.     ROS: denies fever, chills, cp, sob, n/v, HA unless stated above.      sodium chloride flush  5-40 mL IntraVENous 2 times per day    carvedilol  25 mg Oral BID WC    insulin glargine  9 Units SubCUTAneous QAM    insulin lispro  3 Units SubCUTAneous TID WC    pantoprazole (PROTONIX) 40 mg in sodium chloride (PF) 0.9 % 10 mL injection  40 mg IntraVENous Daily    sodium chloride flush  5-40 mL IntraVENous 2 times per day    insulin lispro  0-4 Units SubCUTAneous 4x Daily AC & HS    epoetin nadya-epbx  10,000 Units SubCUTAneous Once per day on Monday Wednesday Friday     metoprolol tartrate, 50 mg, PRN   Or  metoprolol tartrate, 100 mg, PRN   Or  metoprolol tartrate, 150 mg, PRN  nitroGLYCERIN, 0.4 mg, PRN   Or  nitroGLYCERIN, 0.8 mg, PRN  sodium chloride flush, 5-40 mL, PRN  sodium chloride, , PRN  HYDROcodone 5 mg - acetaminophen, 1 tablet, Q6H PRN  calcium carbonate, 1,000 mg, TID PRN  albuterol, 5 mg, 4x Daily PRN  bisacodyl, 10 mg, Daily PRN  diphenhydrAMINE, 25 mg, Q6H PRN  sodium chloride flush, 5-40 mL, PRN  sodium chloride, , PRN  ondansetron, 4 mg, Q8H PRN   Or  ondansetron, 4 mg, Q6H PRN  melatonin, 3 mg, Nightly PRN  acetaminophen, 650 mg, Q6H PRN   Or  acetaminophen, 650 mg, Q6H PRN  nicotine polacrilex, 2 mg, Q1H PRN  glucose, 4 tablet, PRN  dextrose bolus, 125 mL, PRN   Or  dextrose bolus, 250 mL, PRN  glucagon (rDNA), 1 mg, PRN  dextrose, , Continuous PRN  EPINEPHrine PF, 0.3 mg, PRN  HYDROmorphone, 0.2 mg, Q4H PRN  sodium chloride, , PRN         Objective:    BP (!) 159/51   Pulse (!) 102   Temp 99.3 °F (37.4 °C) (Oral)   Resp 20   Ht 1.499 m (4' 11\")   Wt

## 2025-03-21 NOTE — DISCHARGE INSTR - COC
Continuity of Care Form    Patient Name: Michelle Nettles   :  1985  MRN:  [id number]    Admit date:  3/13/2025  Discharge date:  3/25/25    Code Status Order: Full Code   Advance Directives:    Date/Time Healthcare Directive Type of Healthcare Directive Copy in Chart Healthcare Agent Appointed Healthcare Agent's Name Healthcare Agent's Phone Number    [phone] No, patient does not have an advance directive for healthcare treatment  --  --  --  --  --             Admitting Physician:  Jorge Feliz DO  PCP: Rogres Rodríguez MD    Discharging Nurse: Bela Reid RN  Discharging Hospital Unit/Room#: 0728/0728-A  Discharging Unit Phone Number: [phone]    Emergency Contact:   Extended Emergency Contact Information  Primary Emergency Contact: Deana Musa  Home Phone: [phone]  Mobile Phone: [phone]  Relation: Brother/Sister  Preferred language: English   needed? No  [facility]  Home Phone: [phone]  Relation: Other    Past Surgical History:  Past Surgical History:   Procedure Laterality Date    ANKLE SURGERY Left 3/7/2025    REMOVAL OF EXTERNAL FIXATOR FROM LEFT LOWER EXTREMITY, OPEN REDUCTION WITH INTERNAL FIXATION LEFT ANKLE TRIMALLEOLAR FRACTURE performed by Peyman Germain MD at [facility] OR     SECTION      COLONOSCOPY N/A 2024    COLONOSCOPY DIAGNOSTIC performed by Balaji Gonzalez MD at [facility] ENDOSCOPY    COLONOSCOPY N/A 3/18/2025    COLONOSCOPY DIAGNOSTIC performed by Madhu Cordova MD at [facility] ENDOSCOPY    CYST INCISION AND DRAINAGE  2011    perirectal abscess. [facility]. Dr. Fleming    DIALYSIS FISTULA CREATION Left 2019    LEFT ARM FISTULAGRAM, BALLOON ANGIOPLASTY performed by Haylee Zaidi MD at [facility] OR    DILATION AND CURETTAGE OF UTERUS  2009    FRACTURE SURGERY      fracture right ulnar, left tibia, and pelvis    INVASIVE VASCULAR N/A 2023

## 2025-03-21 NOTE — PROGRESS NOTES
ENDOCRINOLOGY PROGRESS NOTE      Date of admission: 3/13/2025  Date of service: 3/21/2025  Admitting physician: Jorge Feliz DO   Primary Care Physician: Rogers Rodríguez MD  Consultant physician: Brennan Dyer MD     Reason for the consultation:  Uncontrolled DM    History of Present Illness:  The history is provided by the patient. Accuracy of the patient data is excellent    Michelle Nettles is a very pleasant 39 y.o. old female with recurrent anemia , ERSD, recent trimalleolar fracture, poorly controlled type 1 diabetes, hidradenitis suppurative admitted to [facility] from nursing facility on 3/13/2025 because of fatigue, endocrine service was consulted for uncontrolled diabetes management.    Subjective  The patient was seen and examined at bedside this morning, no acute events overnight.  Appetite is not very good. Blood glucose is running very high in the morning. NPO after midnight.     Inpatient diet:   Carb Restricted diet     Point of care glucose monitoring   (Independently reviewed)   Recent Labs     03/19/25 2006 03/20/25  0747 03/20/25  1158 03/20/25  1626 03/20/25  1929 03/20/25 2013 03/20/25  2229 03/21/25  0752   POCGLU 273* 241* 148* 101* 54* 66* 173* 303*     Allergy and drug reactions:   Allergies   Allergen Reactions    Vancomycin Shortness Of Breath and Itching       Review of Systems  All systems reviewed. All negative except for symptoms mentioned in HPI     OBJECTIVE    BP (!) 159/51   Pulse (!) 102   Temp 99.3 °F (37.4 °C) (Oral)   Resp 20   Ht 1.499 m (4' 11\")   Wt 71.5 kg (157 lb 10.1 oz)   LMP  (LMP Unknown)   SpO2 100%   BMI 31.84 kg/m²   No intake or output data in the 24 hours ending 03/21/25 1024      Physical examination:  General: awake alert, oriented x3  HEENT: normocephalic non traumatic, no exophthalmos   Neck: supple, No thyroid tenderness,  Pulm: good equal air entry no added sounds  CVS: S1 + S2  Abd: soft lax, no tenderness  Skin: warm, no lesions, no rash. No  open wounds, no ulcers   Neuro: CN intact, sensation decreased bilateral , muscle power normal  Psych: normal mood, and affect    Review of Laboratory Data:  I personally reviewed the following labs:   Recent Labs     03/19/25  0735 03/20/25  1250 03/21/25  0438   WBC 5.9 5.6 5.4   RBC 2.58* 2.68* 2.69*   HGB 7.6* 7.9* 7.9*   HCT 23.4* 24.6* 24.7*   MCV 90.7 91.8 91.8   MCH 29.5 29.5 29.4   MCHC 32.5 32.1 32.0   RDW 13.9 14.5 14.7    232 238   MPV 10.0 9.5 9.7     Recent Labs     03/19/25  0735 03/20/25  1250 03/21/25  0438    140 134   K 5.1* 3.9 4.4   CL 98 101 96*   CO2 22 28 24   BUN 26* 13 17   CREATININE 8.0* 6.0* 7.2*   GLUCOSE 459* 98 528*   CALCIUM 9.1 8.9 8.6   BILITOT 0.3 0.3 0.2   ALKPHOS 115* 107* 117*   AST 14 14 17   ALT <5 <5 <5     Beta-Hydroxybutyrate   Date Value Ref Range Status   03/13/2025 2.87 (H) 0.02 - 0.27 mmol/L Final   04/02/2024 0.11 0.02 - 0.27 mmol/L Final   03/22/2024 4.24 (H) 0.02 - 0.27 mmol/L Final     Lab Results   Component Value Date/Time    LABA1C 5.9 03/03/2025 05:29 AM    LABA1C 6.4 01/28/2025 11:08 AM    LABA1C 6.0 06/06/2024 06:57 PM     Lab Results   Component Value Date/Time    TSH 3.71 03/20/2025 10:50 AM    T4FREE 1.0 03/20/2025 10:50 AM     Lab Results   Component Value Date/Time    LABA1C 5.9 03/03/2025 05:29 AM    GLUCOSE 528 03/21/2025 04:38 AM    GLUCOSE 279 07/27/2011 04:40 PM    MALBCR 1988.2 10/08/2013 10:30 AM     Lab Results   Component Value Date/Time    TRIG 101 03/03/2025 05:30 AM    HDL 30 03/03/2025 05:30 AM    CHOL 112 03/03/2025 05:30 AM       Blood culture   Lab Results   Component Value Date/Time    BC 5 Days no growth 06/03/2023 08:36 AM    BC 5 Days no growth 03/22/2023 02:01 PM       Radiology:  NM MECKELS LOCALIZATION SCAN   Final Result   No gastric mucosa containing Meckel's diverticulum.         NM GI BLOOD LOSS   Final Result   No evidence of active GI bleeding during acquisition.         US RETROPERITONEAL COMPLETE   Final Result

## 2025-03-22 LAB
ALBUMIN SERPL-MCNC: 3.2 G/DL (ref 3.5–5.2)
ALP SERPL-CCNC: 114 U/L (ref 35–104)
ALT SERPL-CCNC: <5 U/L (ref 0–32)
ANION GAP SERPL CALCULATED.3IONS-SCNC: 10 MMOL/L (ref 7–16)
AST SERPL-CCNC: 19 U/L (ref 0–31)
BASOPHILS # BLD: 0.01 K/UL (ref 0–0.2)
BASOPHILS NFR BLD: 0 % (ref 0–2)
BILIRUB SERPL-MCNC: 0.2 MG/DL (ref 0–1.2)
BUN SERPL-MCNC: 13 MG/DL (ref 6–20)
CALCIUM SERPL-MCNC: 8.6 MG/DL (ref 8.6–10.2)
CHLORIDE SERPL-SCNC: 100 MMOL/L (ref 98–107)
CO2 SERPL-SCNC: 27 MMOL/L (ref 22–29)
CREAT SERPL-MCNC: 5.4 MG/DL (ref 0.5–1)
EOSINOPHIL # BLD: 0.28 K/UL (ref 0.05–0.5)
EOSINOPHILS RELATIVE PERCENT: 5 % (ref 0–6)
ERYTHROCYTE [DISTWIDTH] IN BLOOD BY AUTOMATED COUNT: 15.1 % (ref 11.5–15)
FERRITIN SERPL-MCNC: 370 NG/ML
GFR, ESTIMATED: 10 ML/MIN/1.73M2
GLUCOSE BLD-MCNC: 106 MG/DL (ref 74–99)
GLUCOSE BLD-MCNC: 402 MG/DL (ref 74–99)
GLUCOSE BLD-MCNC: 87 MG/DL (ref 74–99)
GLUCOSE BLD-MCNC: 94 MG/DL (ref 74–99)
GLUCOSE SERPL-MCNC: 431 MG/DL (ref 74–99)
HCT VFR BLD AUTO: 24.1 % (ref 34–48)
HGB BLD-MCNC: 7.7 G/DL (ref 11.5–15.5)
IMM GRANULOCYTES # BLD AUTO: <0.03 K/UL (ref 0–0.58)
IMM GRANULOCYTES NFR BLD: 0 % (ref 0–5)
IRON SATN MFR SERPL: 20 % (ref 15–50)
IRON SERPL-MCNC: 40 UG/DL (ref 37–145)
LYMPHOCYTES NFR BLD: 0.95 K/UL (ref 1.5–4)
LYMPHOCYTES RELATIVE PERCENT: 18 % (ref 20–42)
MAGNESIUM SERPL-MCNC: 1.9 MG/DL (ref 1.6–2.6)
MCH RBC QN AUTO: 29.6 PG (ref 26–35)
MCHC RBC AUTO-ENTMCNC: 32 G/DL (ref 32–34.5)
MCV RBC AUTO: 92.7 FL (ref 80–99.9)
MONOCYTES NFR BLD: 0.47 K/UL (ref 0.1–0.95)
MONOCYTES NFR BLD: 9 % (ref 2–12)
NEUTROPHILS NFR BLD: 68 % (ref 43–80)
NEUTS SEG NFR BLD: 3.64 K/UL (ref 1.8–7.3)
PHOSPHATE SERPL-MCNC: 3.8 MG/DL (ref 2.5–4.5)
PLATELET # BLD AUTO: 229 K/UL (ref 130–450)
PMV BLD AUTO: 9.9 FL (ref 7–12)
POTASSIUM SERPL-SCNC: 4.8 MMOL/L (ref 3.5–5)
PROT SERPL-MCNC: 6.3 G/DL (ref 6.4–8.3)
RBC # BLD AUTO: 2.6 M/UL (ref 3.5–5.5)
SODIUM SERPL-SCNC: 137 MMOL/L (ref 132–146)
TIBC SERPL-MCNC: 203 UG/DL (ref 250–450)
WBC OTHER # BLD: 5.4 K/UL (ref 4.5–11.5)

## 2025-03-22 PROCEDURE — 6370000000 HC RX 637 (ALT 250 FOR IP): Performed by: SURGERY

## 2025-03-22 PROCEDURE — 99232 SBSQ HOSP IP/OBS MODERATE 35: CPT | Performed by: STUDENT IN AN ORGANIZED HEALTH CARE EDUCATION/TRAINING PROGRAM

## 2025-03-22 PROCEDURE — 85025 COMPLETE CBC W/AUTO DIFF WBC: CPT

## 2025-03-22 PROCEDURE — 6360000002 HC RX W HCPCS: Performed by: SURGERY

## 2025-03-22 PROCEDURE — 83550 IRON BINDING TEST: CPT

## 2025-03-22 PROCEDURE — 80053 COMPREHEN METABOLIC PANEL: CPT

## 2025-03-22 PROCEDURE — 6370000000 HC RX 637 (ALT 250 FOR IP): Performed by: INTERNAL MEDICINE

## 2025-03-22 PROCEDURE — 82962 GLUCOSE BLOOD TEST: CPT

## 2025-03-22 PROCEDURE — 1200000000 HC SEMI PRIVATE

## 2025-03-22 PROCEDURE — 82728 ASSAY OF FERRITIN: CPT

## 2025-03-22 PROCEDURE — 6370000000 HC RX 637 (ALT 250 FOR IP): Performed by: STUDENT IN AN ORGANIZED HEALTH CARE EDUCATION/TRAINING PROGRAM

## 2025-03-22 PROCEDURE — 6370000000 HC RX 637 (ALT 250 FOR IP): Performed by: CLINICAL NURSE SPECIALIST

## 2025-03-22 PROCEDURE — 83735 ASSAY OF MAGNESIUM: CPT

## 2025-03-22 PROCEDURE — 99232 SBSQ HOSP IP/OBS MODERATE 35: CPT | Performed by: INTERNAL MEDICINE

## 2025-03-22 PROCEDURE — 2580000003 HC RX 258: Performed by: SURGERY

## 2025-03-22 PROCEDURE — 2500000003 HC RX 250 WO HCPCS: Performed by: CLINICAL NURSE SPECIALIST

## 2025-03-22 PROCEDURE — 2500000003 HC RX 250 WO HCPCS: Performed by: SURGERY

## 2025-03-22 PROCEDURE — 83540 ASSAY OF IRON: CPT

## 2025-03-22 PROCEDURE — 2700000000 HC OXYGEN THERAPY PER DAY

## 2025-03-22 PROCEDURE — 84100 ASSAY OF PHOSPHORUS: CPT

## 2025-03-22 RX ORDER — POLYETHYLENE GLYCOL 3350 17 G/17G
17 POWDER, FOR SOLUTION ORAL DAILY PRN
Status: DISCONTINUED | OUTPATIENT
Start: 2025-03-22 | End: 2025-03-25 | Stop reason: HOSPADM

## 2025-03-22 RX ADMIN — HYDROCODONE BITARTRATE AND ACETAMINOPHEN 1 TABLET: 5; 325 TABLET ORAL at 17:41

## 2025-03-22 RX ADMIN — HYDROMORPHONE HYDROCHLORIDE 0.2 MG: 1 INJECTION, SOLUTION INTRAMUSCULAR; INTRAVENOUS; SUBCUTANEOUS at 11:42

## 2025-03-22 RX ADMIN — SODIUM CHLORIDE, PRESERVATIVE FREE 10 ML: 5 INJECTION INTRAVENOUS at 09:33

## 2025-03-22 RX ADMIN — INSULIN LISPRO 3 UNITS: 100 INJECTION, SOLUTION INTRAVENOUS; SUBCUTANEOUS at 17:30

## 2025-03-22 RX ADMIN — HYDROMORPHONE HYDROCHLORIDE 0.2 MG: 1 INJECTION, SOLUTION INTRAMUSCULAR; INTRAVENOUS; SUBCUTANEOUS at 20:42

## 2025-03-22 RX ADMIN — CALCIUM CARBONATE 1000 MG: 500 TABLET, CHEWABLE ORAL at 15:33

## 2025-03-22 RX ADMIN — SODIUM CHLORIDE, PRESERVATIVE FREE 10 ML: 5 INJECTION INTRAVENOUS at 09:31

## 2025-03-22 RX ADMIN — Medication 3 MG: at 20:42

## 2025-03-22 RX ADMIN — INSULIN LISPRO 3 UNITS: 100 INJECTION, SOLUTION INTRAVENOUS; SUBCUTANEOUS at 09:30

## 2025-03-22 RX ADMIN — HYDROCODONE BITARTRATE AND ACETAMINOPHEN 1 TABLET: 5; 325 TABLET ORAL at 05:26

## 2025-03-22 RX ADMIN — SODIUM CHLORIDE, PRESERVATIVE FREE 40 MG: 5 INJECTION INTRAVENOUS at 09:29

## 2025-03-22 RX ADMIN — CARVEDILOL 25 MG: 25 TABLET, FILM COATED ORAL at 09:29

## 2025-03-22 RX ADMIN — POLYETHYLENE GLYCOL 3350 17 G: 17 POWDER, FOR SOLUTION ORAL at 12:53

## 2025-03-22 RX ADMIN — INSULIN GLARGINE 9 UNITS: 100 INJECTION, SOLUTION SUBCUTANEOUS at 09:30

## 2025-03-22 RX ADMIN — SODIUM CHLORIDE, PRESERVATIVE FREE 10 ML: 5 INJECTION INTRAVENOUS at 20:33

## 2025-03-22 RX ADMIN — INSULIN LISPRO 4 UNITS: 100 INJECTION, SOLUTION INTRAVENOUS; SUBCUTANEOUS at 06:19

## 2025-03-22 RX ADMIN — CARVEDILOL 25 MG: 25 TABLET, FILM COATED ORAL at 17:29

## 2025-03-22 RX ADMIN — INSULIN LISPRO 3 UNITS: 100 INJECTION, SOLUTION INTRAVENOUS; SUBCUTANEOUS at 12:38

## 2025-03-22 ASSESSMENT — PAIN SCALES - GENERAL
PAINLEVEL_OUTOF10: 3
PAINLEVEL_OUTOF10: 2
PAINLEVEL_OUTOF10: 7
PAINLEVEL_OUTOF10: 8
PAINLEVEL_OUTOF10: 10
PAINLEVEL_OUTOF10: 9

## 2025-03-22 ASSESSMENT — PAIN DESCRIPTION - DESCRIPTORS
DESCRIPTORS: SHARP;STABBING
DESCRIPTORS: ACHING;DISCOMFORT;SORE
DESCRIPTORS: ACHING
DESCRIPTORS: ACHING

## 2025-03-22 ASSESSMENT — PAIN DESCRIPTION - LOCATION
LOCATION: ANKLE
LOCATION: ANKLE
LOCATION: ANKLE;FOOT
LOCATION: ANKLE

## 2025-03-22 ASSESSMENT — PAIN DESCRIPTION - ORIENTATION
ORIENTATION: LEFT

## 2025-03-22 ASSESSMENT — PAIN SCALES - WONG BAKER: WONGBAKER_NUMERICALRESPONSE: HURTS A LITTLE BIT

## 2025-03-22 NOTE — PLAN OF CARE
Problem: Chronic Conditions and Co-morbidities  Goal: Patient's chronic conditions and co-morbidity symptoms are monitored and maintained or improved  3/22/2025 0952 by Nahun Cee RN  Outcome: Progressing     Problem: Discharge Planning  Goal: Discharge to home or other facility with appropriate resources  3/22/2025 0952 by Nahun Cee RN  Outcome: Progressing     Problem: Pain  Goal: Verbalizes/displays adequate comfort level or baseline comfort level  3/22/2025 0952 by Nahun Cee RN  Outcome: Progressing     Problem: Safety - Adult  Goal: Free from fall injury  3/22/2025 0952 by Nahun Cee RN  Outcome: Progressing

## 2025-03-22 NOTE — PROGRESS NOTES
[facility]   Dialysis Progress Note    Michelle Nettles  03/22/25  12:34 PM    Reason for Consult: ESRD needing dialysis    HISTORY OF PRESENT ILLNESS from the 3/14/25 note:     Michelle Nettles is a 39 y.o. female with a past medical history of anemia of stage V CKD, recent left trimalleolar fracture, ESRD, poorly controlled IDDM, chronic HFpEF, & hidradenitis suppurativa who presented to the ED from [facility] for fatigue with low Hgb.  Patient recently hospitalized at [place] after presenting with N/V/D on 3/3/2024.  Hgb was 5.2.  S/p repeat EGD showed gastritis & Schatzki's ring but no source of bleeding.  S/p explantation of left ankle hardware.  Patient is frustrated she had to return to the hospital once again.  She is conversationally dyspneic.  Over the past 2 days her face has been swelling up.  Was called to the bedside out of concern for transfusion reaction.  Held transfusion for now.  Gave Benadryl, Pepcid and 1 dose of IV Solu-Medrol. .     INTERVAL HX:    3/19/25: Pt awake alert and appropriate. She was seen in dialysis earlier today and was tolerating the procedure well. No c/o abd pain or CP    3/20: Resting comfortably in bed. Notes some increased facial swelling otherwise no new issues reported this AM.    3/21: Patient seen and examined toward end of dialysis treatment.  Procedure well-tolerated.  Spoke with dialysis nurse, prescription reviewed.  No significant complaints.  Tolerated 2 L of ultrafiltration without issue.  Blood pressure slightly above target but otherwise no chest pain or shortness of breath noted.  Blood sugar remains elevated.    3/22: Patient seen and examined.  Resting comfortably in bed not in distress.  She tolerated dialysis yesterday.  However, she states she is not feeling the best today.  Having some left knee pain.    Past Medical History:   Past Medical History:   Diagnosis Date    JOLLY (acute kidney injury) 04/05/2016  smokeless tobacco. She reports that she does not currently use alcohol. She reports that she does not currently use drugs after having used the following drugs: Marijuana (Weed).    Family History:   family history includes Cancer in her father; Diabetes in her paternal aunt; Stroke in her mother.    REVIEW OF SYSTEMS  Minimum of 12 system ROS was performed as able. Pertinent positives are noted.      PHYSICAL EXAM:    Vitals:  BP (!) 157/72   Pulse 95   Temp 98.1 °F (36.7 °C) (Oral)   Resp 18   Ht 1.499 m (4' 11\")   Wt 76.2 kg (167 lb 15.9 oz)   LMP  (LMP Unknown)   SpO2 100%   BMI 33.93 kg/m²   General: Well appearing. In no apparent distress.   Head: Normocephalic. Atraumatic. Oral mucosa moist.   Neck: No visible masses. No JVD. Conjunctiva clear. Sclera non-icteric.  Heart: Regular rate. Regular rhythm. No murmur.   Lungs: Clear to auscultation.  Abdomen: Soft.Nondistended.  No significant lower abdominal tenderness  Psychiatric: Oriented. Normal mood.  Skin: No rash. Warm dry. No lesions.  Extremities: Minimal lower extremity edema.  However, she has edema present on her face as well, LUE AVF good bruit and thrill  LABS:    CBC:   Recent Labs     03/20/25  1250 03/21/25  0438 03/22/25  0518   WBC 5.6 5.4 5.4   HGB 7.9* 7.9* 7.7*    238 229     BMP:   Recent Labs     03/20/25  1250 03/21/25  0438 03/22/25  0518    134 137   K 3.9 4.4 4.8    96* 100   CO2 28 24 27   BUN 13 17 13   CREATININE 6.0* 7.2* 5.4*   GLUCOSE 98 528* 431*      Hepatic:   Recent Labs     03/20/25  1250 03/21/25  0438 03/22/25  0518   AST 14 17 19   ALT <5 <5 <5   BILITOT 0.3 0.2 0.2   ALKPHOS 107* 117* 114*       Troponin: No results for input(s): \"TROPONINI\" in the last 72 hours.  BNP: No results for input(s): \"BNP\" in the last 72 hours.  Lipids: No results for input(s): \"CHOL\", \"HDL\" in the last 72 hours.    Invalid input(s): \"LDLCALCU\"  ABGs: No results found for: \"PHART\", \"PO2ART\", \"JFZ9LUG\"  INR: No results

## 2025-03-22 NOTE — PLAN OF CARE
Problem: Chronic Conditions and Co-morbidities  Goal: Patient's chronic conditions and co-morbidity symptoms are monitored and maintained or improved  3/21/2025 2358 by Marium Renae RN  Outcome: Progressing  3/21/2025 1539 by Malik Page RN  Outcome: Progressing     Problem: Discharge Planning  Goal: Discharge to home or other facility with appropriate resources  3/21/2025 2358 by Marium Renae RN  Outcome: Progressing  3/21/2025 1539 by Malik Page RN  Outcome: Progressing     Problem: Pain  Goal: Verbalizes/displays adequate comfort level or baseline comfort level  3/21/2025 2358 by Marium Renae RN  Outcome: Progressing  3/21/2025 1539 by Malik Page RN  Outcome: Progressing     Problem: Safety - Adult  Goal: Free from fall injury  3/21/2025 2358 by Marium Renae RN  Outcome: Progressing  3/21/2025 1539 by Malik Page RN  Outcome: Progressing

## 2025-03-22 NOTE — PROGRESS NOTES
ENDOCRINOLOGY PROGRESS NOTE      Date of admission: 3/13/2025  Date of service: 3/22/2025  Admitting physician: Jorge Feliz DO   Primary Care Physician: Rogers Rodríguez MD  Consultant physician: Brennan Dyer MD     Reason for the consultation:  Uncontrolled DM    History of Present Illness:  The history is provided by the patient. Accuracy of the patient data is excellent    Michelle Nettles is a very pleasant 39 y.o. old female with recurrent anemia , ERSD, recent trimalleolar fracture, poorly controlled type 1 diabetes, hidradenitis suppurative admitted to [facility] from nursing facility on 3/13/2025 because of fatigue, endocrine service was consulted for uncontrolled diabetes management.    Subjective  I saw and examined the patient this morning, glucose level was good at bedtime last night but very high this morning.  The patient had a snack at bedtime last    Inpatient diet:   Carb Restricted diet     Point of care glucose monitoring   (Independently reviewed)   Recent Labs     03/20/25  2013 03/20/25  2229 03/21/25  0752 03/21/25  1207 03/21/25  1335 03/21/25  1652 03/21/25  2122 03/22/25  0617   POCGLU 66* 173* 303* 178* 264* 248* 134* 402*     Allergy and drug reactions:   Allergies   Allergen Reactions    Vancomycin Shortness Of Breath and Itching       Review of Systems  All systems reviewed. All negative except for symptoms mentioned in HPI     OBJECTIVE    BP (!) 157/72   Pulse 95   Temp 98.1 °F (36.7 °C) (Oral)   Resp 18   Ht 1.499 m (4' 11\")   Wt 76.2 kg (167 lb 15.9 oz)   LMP  (LMP Unknown)   SpO2 100%   BMI 33.93 kg/m²     Intake/Output Summary (Last 24 hours) at 3/22/2025 0950  Last data filed at 3/22/2025 0708  Gross per 24 hour   Intake 660 ml   Output 2300 ml   Net -1640 ml         Physical examination:  General: awake alert, oriented x3  HEENT: normocephalic non traumatic, no exophthalmos   Neck: supple, No thyroid tenderness,  Pulm: good equal air entry no added sounds  CVS: S1 +  severe anemia:  Patient had multiple EGDs, negative colonoscopy, Neg Meckel's scan.   Plan for pill endoscopy.   General surgery is following    Interdisciplinary plan for communication with healthcare providers:   Consult recommendations were discussed with the Primary Service/Nursing staff      The above issues were reviewed with the patient who understood and agreed with the plan.    Thank you for allowing us to participate in the care of this patient. Please do not hesitate to contact us with any additional questions.     Brennan Dyer MD  Endocrinologist, [facility]   [facility] INTERMDIATE MED SURG  [address] [address]  Dept: [phone]  Loc: [phone]   Phone: [phone]  Fax: [phone]  --------------------------------------------  An electronic signature was used to authenticate this note. Brennan Dyer MD on 3/22/2025 at 9:50 AM

## 2025-03-22 NOTE — PROGRESS NOTES
[facility] Hospitalist Progress Note    Admitting Date and Time: 3/13/2025  8:02 PM  Admit Dx: Acute on chronic anemia [D64.9]  Severe anemia [D64.9]    Subjective:  Patient is being followed for Acute on chronic anemia [D64.9]  Severe anemia [D64.9]   Pt feels constipated, reports rt knee pain, blood glucose high.  Per RN: no major concerns.     ROS: denies fever, chills, cp, sob, n/v, HA unless stated above.      sodium chloride flush  5-40 mL IntraVENous 2 times per day    carvedilol  25 mg Oral BID WC    insulin glargine  9 Units SubCUTAneous QAM    insulin lispro  3 Units SubCUTAneous TID WC    pantoprazole (PROTONIX) 40 mg in sodium chloride (PF) 0.9 % 10 mL injection  40 mg IntraVENous Daily    sodium chloride flush  5-40 mL IntraVENous 2 times per day    insulin lispro  0-4 Units SubCUTAneous 4x Daily AC & HS    epoetin nadya-epbx  10,000 Units SubCUTAneous Once per day on Monday Wednesday Friday     polyethylene glycol, 17 g, Daily PRN  metoprolol tartrate, 50 mg, PRN   Or  metoprolol tartrate, 100 mg, PRN   Or  metoprolol tartrate, 150 mg, PRN  nitroGLYCERIN, 0.4 mg, PRN   Or  nitroGLYCERIN, 0.8 mg, PRN  sodium chloride flush, 5-40 mL, PRN  sodium chloride, , PRN  HYDROcodone 5 mg - acetaminophen, 1 tablet, Q6H PRN  calcium carbonate, 1,000 mg, TID PRN  albuterol, 5 mg, 4x Daily PRN  bisacodyl, 10 mg, Daily PRN  diphenhydrAMINE, 25 mg, Q6H PRN  sodium chloride flush, 5-40 mL, PRN  sodium chloride, , PRN  ondansetron, 4 mg, Q8H PRN   Or  ondansetron, 4 mg, Q6H PRN  melatonin, 3 mg, Nightly PRN  acetaminophen, 650 mg, Q6H PRN   Or  acetaminophen, 650 mg, Q6H PRN  nicotine polacrilex, 2 mg, Q1H PRN  glucose, 4 tablet, PRN  dextrose bolus, 125 mL, PRN   Or  dextrose bolus, 250 mL, PRN  glucagon (rDNA), 1 mg, PRN  dextrose, , Continuous PRN  EPINEPHrine PF, 0.3 mg, PRN  HYDROmorphone, 0.2 mg, Q4H PRN  sodium chloride, , PRN         Objective:    BP (!) 157/72   Pulse 95   Temp 98.1 °F (36.7 °C) (Oral)

## 2025-03-22 NOTE — PROGRESS NOTES
Labs : wbc=5 Hb=7.7 mca=563    A/P: 39 year old female with anemia of ESRD on hemodialysis admitted with occult + stool but colonoscopy negative 3-18-25 and had recent EGD also negative. No further endoscopies planned and Hb is stable. IV iron will be given with dialysis prn per nephrology. Transfuse to keep Hb >7    - patient can f/u with Dr. Thibodeaux in 2 to 4 weeks   - [facility] will sign off, call back if needed.    Annette Vaz MD

## 2025-03-23 LAB
ALBUMIN SERPL-MCNC: 3.3 G/DL (ref 3.5–5.2)
ALP SERPL-CCNC: 105 U/L (ref 35–104)
ALT SERPL-CCNC: 6 U/L (ref 0–32)
ANION GAP SERPL CALCULATED.3IONS-SCNC: 12 MMOL/L (ref 7–16)
AST SERPL-CCNC: 18 U/L (ref 0–31)
BILIRUB SERPL-MCNC: 0.3 MG/DL (ref 0–1.2)
BUN SERPL-MCNC: 20 MG/DL (ref 6–20)
CALCIUM SERPL-MCNC: 8.6 MG/DL (ref 8.6–10.2)
CHLORIDE SERPL-SCNC: 105 MMOL/L (ref 98–107)
CO2 SERPL-SCNC: 26 MMOL/L (ref 22–29)
CREAT SERPL-MCNC: 7.7 MG/DL (ref 0.5–1)
GFR, ESTIMATED: 6 ML/MIN/1.73M2
GLUCOSE BLD-MCNC: 161 MG/DL (ref 74–99)
GLUCOSE BLD-MCNC: 250 MG/DL (ref 74–99)
GLUCOSE BLD-MCNC: 305 MG/DL (ref 74–99)
GLUCOSE BLD-MCNC: 82 MG/DL (ref 74–99)
GLUCOSE SERPL-MCNC: 141 MG/DL (ref 74–99)
MAGNESIUM SERPL-MCNC: 1.9 MG/DL (ref 1.6–2.6)
PHOSPHATE SERPL-MCNC: 4.5 MG/DL (ref 2.5–4.5)
POTASSIUM SERPL-SCNC: 4.9 MMOL/L (ref 3.5–5)
PROT SERPL-MCNC: 6.3 G/DL (ref 6.4–8.3)
SODIUM SERPL-SCNC: 143 MMOL/L (ref 132–146)

## 2025-03-23 PROCEDURE — 2580000003 HC RX 258: Performed by: SURGERY

## 2025-03-23 PROCEDURE — 83735 ASSAY OF MAGNESIUM: CPT

## 2025-03-23 PROCEDURE — 82962 GLUCOSE BLOOD TEST: CPT

## 2025-03-23 PROCEDURE — 6360000002 HC RX W HCPCS: Performed by: SURGERY

## 2025-03-23 PROCEDURE — 6370000000 HC RX 637 (ALT 250 FOR IP): Performed by: SURGERY

## 2025-03-23 PROCEDURE — 2700000000 HC OXYGEN THERAPY PER DAY

## 2025-03-23 PROCEDURE — 2500000003 HC RX 250 WO HCPCS: Performed by: SURGERY

## 2025-03-23 PROCEDURE — 84100 ASSAY OF PHOSPHORUS: CPT

## 2025-03-23 PROCEDURE — 6370000000 HC RX 637 (ALT 250 FOR IP)

## 2025-03-23 PROCEDURE — 80053 COMPREHEN METABOLIC PANEL: CPT

## 2025-03-23 PROCEDURE — 6370000000 HC RX 637 (ALT 250 FOR IP): Performed by: STUDENT IN AN ORGANIZED HEALTH CARE EDUCATION/TRAINING PROGRAM

## 2025-03-23 PROCEDURE — 6370000000 HC RX 637 (ALT 250 FOR IP): Performed by: INTERNAL MEDICINE

## 2025-03-23 PROCEDURE — 99232 SBSQ HOSP IP/OBS MODERATE 35: CPT | Performed by: STUDENT IN AN ORGANIZED HEALTH CARE EDUCATION/TRAINING PROGRAM

## 2025-03-23 PROCEDURE — 2500000003 HC RX 250 WO HCPCS: Performed by: CLINICAL NURSE SPECIALIST

## 2025-03-23 PROCEDURE — 99232 SBSQ HOSP IP/OBS MODERATE 35: CPT | Performed by: INTERNAL MEDICINE

## 2025-03-23 PROCEDURE — 1200000000 HC SEMI PRIVATE

## 2025-03-23 RX ORDER — AMLODIPINE BESYLATE 5 MG/1
5 TABLET ORAL DAILY
Status: DISCONTINUED | OUTPATIENT
Start: 2025-03-23 | End: 2025-03-25 | Stop reason: HOSPADM

## 2025-03-23 RX ORDER — CARVEDILOL 25 MG/1
25 TABLET ORAL 2 TIMES DAILY WITH MEALS
Status: DISCONTINUED | OUTPATIENT
Start: 2025-03-23 | End: 2025-03-25 | Stop reason: HOSPADM

## 2025-03-23 RX ADMIN — CALCIUM CARBONATE 1000 MG: 500 TABLET, CHEWABLE ORAL at 16:51

## 2025-03-23 RX ADMIN — DIPHENHYDRAMINE HCL 25 MG: 25 TABLET ORAL at 20:34

## 2025-03-23 RX ADMIN — INSULIN GLARGINE 9 UNITS: 100 INJECTION, SOLUTION SUBCUTANEOUS at 10:04

## 2025-03-23 RX ADMIN — HYDROMORPHONE HYDROCHLORIDE 0.2 MG: 1 INJECTION, SOLUTION INTRAMUSCULAR; INTRAVENOUS; SUBCUTANEOUS at 11:54

## 2025-03-23 RX ADMIN — SODIUM CHLORIDE, PRESERVATIVE FREE 10 ML: 5 INJECTION INTRAVENOUS at 10:08

## 2025-03-23 RX ADMIN — AMLODIPINE BESYLATE 5 MG: 5 TABLET ORAL at 10:43

## 2025-03-23 RX ADMIN — INSULIN LISPRO 3 UNITS: 100 INJECTION, SOLUTION INTRAVENOUS; SUBCUTANEOUS at 11:52

## 2025-03-23 RX ADMIN — CARVEDILOL 25 MG: 25 TABLET, FILM COATED ORAL at 05:11

## 2025-03-23 RX ADMIN — INSULIN LISPRO 3 UNITS: 100 INJECTION, SOLUTION INTRAVENOUS; SUBCUTANEOUS at 10:04

## 2025-03-23 RX ADMIN — INSULIN LISPRO 3 UNITS: 100 INJECTION, SOLUTION INTRAVENOUS; SUBCUTANEOUS at 11:53

## 2025-03-23 RX ADMIN — INSULIN LISPRO 2 UNITS: 100 INJECTION, SOLUTION INTRAVENOUS; SUBCUTANEOUS at 20:39

## 2025-03-23 RX ADMIN — HYDROCODONE BITARTRATE AND ACETAMINOPHEN 1 TABLET: 5; 325 TABLET ORAL at 08:01

## 2025-03-23 RX ADMIN — SODIUM CHLORIDE, PRESERVATIVE FREE 10 ML: 5 INJECTION INTRAVENOUS at 08:02

## 2025-03-23 RX ADMIN — SODIUM CHLORIDE, PRESERVATIVE FREE 10 ML: 5 INJECTION INTRAVENOUS at 20:35

## 2025-03-23 RX ADMIN — HYDROCODONE BITARTRATE AND ACETAMINOPHEN 1 TABLET: 5; 325 TABLET ORAL at 20:35

## 2025-03-23 RX ADMIN — SODIUM CHLORIDE, PRESERVATIVE FREE 40 MG: 5 INJECTION INTRAVENOUS at 08:02

## 2025-03-23 RX ADMIN — CARVEDILOL 25 MG: 25 TABLET, FILM COATED ORAL at 16:51

## 2025-03-23 RX ADMIN — HYDROMORPHONE HYDROCHLORIDE 0.2 MG: 1 INJECTION, SOLUTION INTRAMUSCULAR; INTRAVENOUS; SUBCUTANEOUS at 23:32

## 2025-03-23 ASSESSMENT — PAIN SCALES - GENERAL
PAINLEVEL_OUTOF10: 8
PAINLEVEL_OUTOF10: 0
PAINLEVEL_OUTOF10: 8
PAINLEVEL_OUTOF10: 10
PAINLEVEL_OUTOF10: 8
PAINLEVEL_OUTOF10: 0
PAINLEVEL_OUTOF10: 2

## 2025-03-23 ASSESSMENT — PAIN DESCRIPTION - ORIENTATION
ORIENTATION: LEFT

## 2025-03-23 ASSESSMENT — PAIN DESCRIPTION - DESCRIPTORS
DESCRIPTORS: ACHING;DISCOMFORT;SORE
DESCRIPTORS: ACHING
DESCRIPTORS: ACHING
DESCRIPTORS: ACHING;DISCOMFORT;SORE

## 2025-03-23 ASSESSMENT — PAIN DESCRIPTION - LOCATION
LOCATION: ANKLE
LOCATION: ANKLE
LOCATION: ANKLE;KNEE
LOCATION: ANKLE

## 2025-03-23 NOTE — PLAN OF CARE
Problem: Chronic Conditions and Co-morbidities  Goal: Patient's chronic conditions and co-morbidity symptoms are monitored and maintained or improved  3/22/2025 2145 by Peterson Mojica, RN  Outcome: Progressing     Problem: Discharge Planning  Goal: Discharge to home or other facility with appropriate resources  3/22/2025 2145 by Peterson Mojica, RN  Outcome: Progressing     Problem: Pain  Goal: Verbalizes/displays adequate comfort level or baseline comfort level  3/22/2025 2145 by Peetrson Mojica, RN  Outcome: Progressing     Problem: Safety - Adult  Goal: Free from fall injury  3/22/2025 2145 by Peterson Mojica, RN  Outcome: Progressing

## 2025-03-23 NOTE — PROGRESS NOTES
[facility]   Nephrology Progress Note    Michelle Nettles  03/23/25  12:50 PM    Reason for Consult: ESRD needing dialysis    HISTORY OF PRESENT ILLNESS from the 3/14/25 note:     Michelle Nettles is a 39 y.o. female with a past medical history of anemia of stage V CKD, recent left trimalleolar fracture, ESRD, poorly controlled IDDM, chronic HFpEF, & hidradenitis suppurativa who presented to the ED from [facility] for fatigue with low Hgb.  Patient recently hospitalized at [facility] after presenting with N/V/D on 3/3/2024.  Hgb was 5.2.  S/p repeat EGD showed gastritis & Schatzki's ring but no source of bleeding.  S/p explantation of left ankle hardware.  Patient is frustrated she had to return to the hospital once again.  She is conversationally dyspneic.  Over the past 2 days her face has been swelling up.  Was called to the bedside out of concern for transfusion reaction.  Held transfusion for now.  Gave Benadryl, Pepcid and 1 dose of IV Solu-Medrol. .     INTERVAL HX:    3/19/25: Pt awake alert and appropriate. She was seen in dialysis earlier today and was tolerating the procedure well. No c/o abd pain or CP    3/20: Resting comfortably in bed. Notes some increased facial swelling otherwise no new issues reported this AM.    3/21: Patient seen and examined toward end of dialysis treatment.  Procedure well-tolerated.  Spoke with dialysis nurse, prescription reviewed.  No significant complaints.  Tolerated 2 L of ultrafiltration without issue.  Blood pressure slightly above target but otherwise no chest pain or shortness of breath noted.  Blood sugar remains elevated.    3/22: Patient seen and examined.  Resting comfortably in bed not in distress.  She tolerated dialysis yesterday.  However, she states she is not feeling the best today.  Having some left knee pain.    3/23: Patient seen and examined at bedside.  Over the past 24 hours, blood pressures continue to rise

## 2025-03-23 NOTE — PROGRESS NOTES
ENDOCRINOLOGY PROGRESS NOTE      Date of admission: 3/13/2025  Date of service: 3/23/2025  Admitting physician: Jorge Feliz DO   Primary Care Physician: Rogers Rodríguez MD  Consultant physician: Brennan Dyer MD     Reason for the consultation:  Uncontrolled DM    History of Present Illness:  The history is provided by the patient. Accuracy of the patient data is excellent    Michelle Nettles is a very pleasant 39 y.o. old female with recurrent anemia , ERSD, recent trimalleolar fracture, poorly controlled type 1 diabetes, hidradenitis suppurative admitted to [facility] from nursing facility on 3/13/2025 because of fatigue, endocrine service was consulted for uncontrolled diabetes management.    Subjective  I saw and examined the patient this morning, glucose level was good at bedtime last night but very high this morning.  The patient had a snack at bedtime last    Inpatient diet:   Carb Restricted diet     Point of care glucose monitoring   (Independently reviewed)   Recent Labs     03/21/25  2122 03/22/25  0617 03/22/25  1135 03/22/25  1709 03/22/25  2031 03/23/25  0514 03/23/25  1145 03/23/25  1630   POCGLU 134* 402* 106* 94 87 161* 305* 82     Allergy and drug reactions:   Allergies   Allergen Reactions    Vancomycin Shortness Of Breath and Itching       Review of Systems  All systems reviewed. All negative except for symptoms mentioned in HPI     OBJECTIVE    BP (!) 148/65   Pulse 60   Temp 97.7 °F (36.5 °C) (Oral)   Resp 16   Ht 1.499 m (4' 11\")   Wt 76.2 kg (167 lb 15.9 oz)   LMP  (LMP Unknown)   SpO2 99%   BMI 33.93 kg/m²   No intake or output data in the 24 hours ending 03/23/25 1742        Physical examination:  General: awake alert, oriented x3  HEENT: normocephalic non traumatic, no exophthalmos   Neck: supple, No thyroid tenderness,  Pulm: good equal air entry no added sounds  CVS: S1 + S2  Abd: soft lax, no tenderness  Skin: warm, no lesions, no rash. No open wounds, no ulcers   Neuro: CN  intact, sensation decreased bilateral , muscle power normal  Psych: normal mood, and affect    Review of Laboratory Data:  I personally reviewed the following labs:   Recent Labs     03/21/25 0438 03/22/25 0518   WBC 5.4 5.4   RBC 2.69* 2.60*   HGB 7.9* 7.7*   HCT 24.7* 24.1*   MCV 91.8 92.7   MCH 29.4 29.6   MCHC 32.0 32.0   RDW 14.7 15.1*    229   MPV 9.7 9.9     Recent Labs     03/21/25 0438 03/22/25  0518 03/23/25  0450    137 143   K 4.4 4.8 4.9   CL 96* 100 105   CO2 24 27 26   BUN 17 13 20   CREATININE 7.2* 5.4* 7.7*   GLUCOSE 528* 431* 141*   CALCIUM 8.6 8.6 8.6   BILITOT 0.2 0.2 0.3   ALKPHOS 117* 114* 105*   AST 17 19 18   ALT <5 <5 6     Beta-Hydroxybutyrate   Date Value Ref Range Status   03/13/2025 2.87 (H) 0.02 - 0.27 mmol/L Final   04/02/2024 0.11 0.02 - 0.27 mmol/L Final   03/22/2024 4.24 (H) 0.02 - 0.27 mmol/L Final     Lab Results   Component Value Date/Time    LABA1C 5.9 03/03/2025 05:29 AM    LABA1C 6.4 01/28/2025 11:08 AM    LABA1C 6.0 06/06/2024 06:57 PM     Lab Results   Component Value Date/Time    TSH 3.71 03/20/2025 10:50 AM    T4FREE 1.0 03/20/2025 10:50 AM     Lab Results   Component Value Date/Time    LABA1C 5.9 03/03/2025 05:29 AM    GLUCOSE 141 03/23/2025 04:50 AM    GLUCOSE 279 07/27/2011 04:40 PM    MALBCR 1988.2 10/08/2013 10:30 AM     Lab Results   Component Value Date/Time    TRIG 101 03/03/2025 05:30 AM    HDL 30 03/03/2025 05:30 AM    CHOL 112 03/03/2025 05:30 AM       Blood culture   Lab Results   Component Value Date/Time    BC 5 Days no growth 06/03/2023 08:36 AM    BC 5 Days no growth 03/22/2023 02:01 PM       Radiology:  NM MECKELS LOCALIZATION SCAN   Final Result   No gastric mucosa containing Meckel's diverticulum.         NM GI BLOOD LOSS   Final Result   No evidence of active GI bleeding during acquisition.         US RETROPERITONEAL COMPLETE   Final Result   1. No change in moderate bilateral atrophy with renal cortical thinning.   There is increased

## 2025-03-23 NOTE — PROGRESS NOTES
[facility] Hospitalist Progress Note    Admitting Date and Time: 3/13/2025  8:02 PM  Admit Dx: Acute on chronic anemia [D64.9]  Severe anemia [D64.9]    Subjective:  Patient is being followed for Acute on chronic anemia [D64.9]  Severe anemia [D64.9]   Pt feels constipation slightly improved. Requesting if possible get cardiac and surgical ( scopes) done IP .  Per RN: no major concerns.     ROS: denies fever, chills, cp, sob, n/v, HA unless stated above.      carvedilol  25 mg Oral BID WC    sodium chloride flush  5-40 mL IntraVENous 2 times per day    insulin glargine  9 Units SubCUTAneous QAM    insulin lispro  3 Units SubCUTAneous TID WC    pantoprazole (PROTONIX) 40 mg in sodium chloride (PF) 0.9 % 10 mL injection  40 mg IntraVENous Daily    sodium chloride flush  5-40 mL IntraVENous 2 times per day    insulin lispro  0-4 Units SubCUTAneous 4x Daily AC & HS    epoetin nadya-epbx  10,000 Units SubCUTAneous Once per day on Monday Wednesday Friday     polyethylene glycol, 17 g, Daily PRN  metoprolol tartrate, 50 mg, PRN   Or  metoprolol tartrate, 100 mg, PRN   Or  metoprolol tartrate, 150 mg, PRN  nitroGLYCERIN, 0.4 mg, PRN   Or  nitroGLYCERIN, 0.8 mg, PRN  sodium chloride flush, 5-40 mL, PRN  sodium chloride, , PRN  HYDROcodone 5 mg - acetaminophen, 1 tablet, Q6H PRN  calcium carbonate, 1,000 mg, TID PRN  albuterol, 5 mg, 4x Daily PRN  bisacodyl, 10 mg, Daily PRN  diphenhydrAMINE, 25 mg, Q6H PRN  sodium chloride flush, 5-40 mL, PRN  sodium chloride, , PRN  ondansetron, 4 mg, Q8H PRN   Or  ondansetron, 4 mg, Q6H PRN  melatonin, 3 mg, Nightly PRN  acetaminophen, 650 mg, Q6H PRN   Or  acetaminophen, 650 mg, Q6H PRN  nicotine polacrilex, 2 mg, Q1H PRN  glucose, 4 tablet, PRN  dextrose bolus, 125 mL, PRN   Or  dextrose bolus, 250 mL, PRN  glucagon (rDNA), 1 mg, PRN  dextrose, , Continuous PRN  EPINEPHrine PF, 0.3 mg, PRN  HYDROmorphone, 0.2 mg, Q4H PRN  sodium chloride, , PRN         Objective:    BP (!) 190/80  dialysis, nephrology consulted for dialysis needs  Chronic elevation of troponin in the setting of ESRD, denies chest pain, monitor  Hypoglycemia - on ISS, hold premeal & basal Insulin.  Chest pain - elevated trops, non specific changes on EKG, yesterday reported SOB with chest heaviness, today reports chest discomfort again, not improved with tums. Cardiology consulted. Noted echo -ef- 55% , plans for ischemia workup coronary CT angiogram/ stress OP.    Dvt Prophylaxis - SCDs  Dispo  - Currently from [facility] and plan to go to [facility] , accepted. Will need updated therapies on dc.     NOTE: This report was transcribed using voice recognition software. Every effort was made to ensure accuracy; however, inadvertent computerized transcription errors may be present.  Electronically signed by Rachael Salcido MD on 3/23/2025 at 9:10 AM

## 2025-03-24 LAB
ALBUMIN SERPL-MCNC: 3.2 G/DL (ref 3.5–5.2)
ALP SERPL-CCNC: 115 U/L (ref 35–104)
ALT SERPL-CCNC: 6 U/L (ref 0–32)
ANION GAP SERPL CALCULATED.3IONS-SCNC: 15 MMOL/L (ref 7–16)
AST SERPL-CCNC: 13 U/L (ref 0–31)
BILIRUB SERPL-MCNC: 0.2 MG/DL (ref 0–1.2)
BUN SERPL-MCNC: 30 MG/DL (ref 6–20)
CALCIUM SERPL-MCNC: 8.3 MG/DL (ref 8.6–10.2)
CHLORIDE SERPL-SCNC: 104 MMOL/L (ref 98–107)
CO2 SERPL-SCNC: 24 MMOL/L (ref 22–29)
CREAT SERPL-MCNC: 9 MG/DL (ref 0.5–1)
GFR, ESTIMATED: 5 ML/MIN/1.73M2
GLUCOSE BLD-MCNC: 264 MG/DL (ref 74–99)
GLUCOSE BLD-MCNC: 308 MG/DL (ref 74–99)
GLUCOSE BLD-MCNC: 309 MG/DL (ref 74–99)
GLUCOSE BLD-MCNC: 330 MG/DL (ref 74–99)
GLUCOSE BLD-MCNC: 383 MG/DL (ref 74–99)
GLUCOSE SERPL-MCNC: 391 MG/DL (ref 74–99)
MAGNESIUM SERPL-MCNC: 2.1 MG/DL (ref 1.6–2.6)
PHOSPHATE SERPL-MCNC: 4.5 MG/DL (ref 2.5–4.5)
POTASSIUM SERPL-SCNC: 5.3 MMOL/L (ref 3.5–5)
PROT SERPL-MCNC: 6.3 G/DL (ref 6.4–8.3)
SODIUM SERPL-SCNC: 143 MMOL/L (ref 132–146)

## 2025-03-24 PROCEDURE — 6370000000 HC RX 637 (ALT 250 FOR IP)

## 2025-03-24 PROCEDURE — 2500000003 HC RX 250 WO HCPCS: Performed by: CLINICAL NURSE SPECIALIST

## 2025-03-24 PROCEDURE — 84100 ASSAY OF PHOSPHORUS: CPT

## 2025-03-24 PROCEDURE — 6370000000 HC RX 637 (ALT 250 FOR IP): Performed by: INTERNAL MEDICINE

## 2025-03-24 PROCEDURE — 90935 HEMODIALYSIS ONE EVALUATION: CPT

## 2025-03-24 PROCEDURE — 99232 SBSQ HOSP IP/OBS MODERATE 35: CPT | Performed by: INTERNAL MEDICINE

## 2025-03-24 PROCEDURE — 99232 SBSQ HOSP IP/OBS MODERATE 35: CPT | Performed by: STUDENT IN AN ORGANIZED HEALTH CARE EDUCATION/TRAINING PROGRAM

## 2025-03-24 PROCEDURE — 6370000000 HC RX 637 (ALT 250 FOR IP): Performed by: SURGERY

## 2025-03-24 PROCEDURE — 82962 GLUCOSE BLOOD TEST: CPT

## 2025-03-24 PROCEDURE — 2580000003 HC RX 258: Performed by: SURGERY

## 2025-03-24 PROCEDURE — 6370000000 HC RX 637 (ALT 250 FOR IP): Performed by: STUDENT IN AN ORGANIZED HEALTH CARE EDUCATION/TRAINING PROGRAM

## 2025-03-24 PROCEDURE — 2500000003 HC RX 250 WO HCPCS: Performed by: SURGERY

## 2025-03-24 PROCEDURE — 83735 ASSAY OF MAGNESIUM: CPT

## 2025-03-24 PROCEDURE — 1200000000 HC SEMI PRIVATE

## 2025-03-24 PROCEDURE — 6360000002 HC RX W HCPCS: Performed by: SURGERY

## 2025-03-24 PROCEDURE — 80053 COMPREHEN METABOLIC PANEL: CPT

## 2025-03-24 PROCEDURE — 2700000000 HC OXYGEN THERAPY PER DAY

## 2025-03-24 RX ADMIN — INSULIN GLARGINE 9 UNITS: 100 INJECTION, SOLUTION SUBCUTANEOUS at 12:19

## 2025-03-24 RX ADMIN — HYDROMORPHONE HYDROCHLORIDE 0.2 MG: 1 INJECTION, SOLUTION INTRAMUSCULAR; INTRAVENOUS; SUBCUTANEOUS at 06:34

## 2025-03-24 RX ADMIN — EPOETIN ALFA-EPBX 10000 UNITS: 10000 INJECTION, SOLUTION INTRAVENOUS; SUBCUTANEOUS at 17:13

## 2025-03-24 RX ADMIN — HYDROCODONE BITARTRATE AND ACETAMINOPHEN 1 TABLET: 5; 325 TABLET ORAL at 12:52

## 2025-03-24 RX ADMIN — SODIUM CHLORIDE, PRESERVATIVE FREE 40 MG: 5 INJECTION INTRAVENOUS at 12:26

## 2025-03-24 RX ADMIN — CARVEDILOL 25 MG: 25 TABLET, FILM COATED ORAL at 17:15

## 2025-03-24 RX ADMIN — SODIUM CHLORIDE, PRESERVATIVE FREE 10 ML: 5 INJECTION INTRAVENOUS at 12:28

## 2025-03-24 RX ADMIN — INSULIN LISPRO 2 UNITS: 100 INJECTION, SOLUTION INTRAVENOUS; SUBCUTANEOUS at 12:16

## 2025-03-24 RX ADMIN — SODIUM CHLORIDE, PRESERVATIVE FREE 10 ML: 5 INJECTION INTRAVENOUS at 21:16

## 2025-03-24 RX ADMIN — SODIUM CHLORIDE, PRESERVATIVE FREE 10 ML: 5 INJECTION INTRAVENOUS at 21:17

## 2025-03-24 RX ADMIN — INSULIN LISPRO 3 UNITS: 100 INJECTION, SOLUTION INTRAVENOUS; SUBCUTANEOUS at 17:14

## 2025-03-24 RX ADMIN — INSULIN LISPRO 3 UNITS: 100 INJECTION, SOLUTION INTRAVENOUS; SUBCUTANEOUS at 17:13

## 2025-03-24 RX ADMIN — INSULIN LISPRO 3 UNITS: 100 INJECTION, SOLUTION INTRAVENOUS; SUBCUTANEOUS at 21:08

## 2025-03-24 RX ADMIN — DIPHENHYDRAMINE HCL 25 MG: 25 TABLET ORAL at 21:15

## 2025-03-24 RX ADMIN — AMLODIPINE BESYLATE 5 MG: 5 TABLET ORAL at 12:25

## 2025-03-24 RX ADMIN — INSULIN LISPRO 3 UNITS: 100 INJECTION, SOLUTION INTRAVENOUS; SUBCUTANEOUS at 12:18

## 2025-03-24 RX ADMIN — HYDROCODONE BITARTRATE AND ACETAMINOPHEN 1 TABLET: 5; 325 TABLET ORAL at 21:15

## 2025-03-24 RX ADMIN — HYDROMORPHONE HYDROCHLORIDE 0.2 MG: 1 INJECTION, SOLUTION INTRAMUSCULAR; INTRAVENOUS; SUBCUTANEOUS at 17:33

## 2025-03-24 RX ADMIN — CARVEDILOL 25 MG: 25 TABLET, FILM COATED ORAL at 12:26

## 2025-03-24 ASSESSMENT — PAIN SCALES - GENERAL
PAINLEVEL_OUTOF10: 8
PAINLEVEL_OUTOF10: 7
PAINLEVEL_OUTOF10: 9
PAINLEVEL_OUTOF10: 5
PAINLEVEL_OUTOF10: 0
PAINLEVEL_OUTOF10: 0
PAINLEVEL_OUTOF10: 9
PAINLEVEL_OUTOF10: 7

## 2025-03-24 ASSESSMENT — PAIN DESCRIPTION - LOCATION
LOCATION: ANKLE;KNEE
LOCATION: ANKLE;KNEE
LOCATION: ABDOMEN;KNEE
LOCATION: ANKLE;KNEE
LOCATION: KNEE;ANKLE

## 2025-03-24 ASSESSMENT — PAIN DESCRIPTION - ORIENTATION
ORIENTATION: LEFT

## 2025-03-24 ASSESSMENT — PAIN DESCRIPTION - DESCRIPTORS
DESCRIPTORS: ACHING;DISCOMFORT;SORE
DESCRIPTORS: ACHING;DISCOMFORT;SORE

## 2025-03-24 NOTE — PROGRESS NOTES
ENDOCRINOLOGY PROGRESS NOTE      Date of admission: 3/13/2025  Date of service: 3/24/2025  Admitting physician: Jorge Feliz DO   Primary Care Physician: Rogers Rodríguez MD  Consultant physician: Brennan Dyer MD     Reason for the consultation:  Uncontrolled DM    History of Present Illness:  The history is provided by the patient. Accuracy of the patient data is excellent    Michelle Nettles is a very pleasant 39 y.o. old female with recurrent anemia , ERSD, recent trimalleolar fracture, poorly controlled type 1 diabetes, hidradenitis suppurative admitted to [facility] from nursing facility on 3/13/2025 because of fatigue, endocrine service was consulted for uncontrolled diabetes management.    Subjective  I saw and examined the patient this morning, glucose level running above goal     Inpatient diet:   Carb Restricted diet     Point of care glucose monitoring   (Independently reviewed)   Recent Labs     03/23/25  0514 03/23/25  1145 03/23/25  1630 03/23/25  2039 03/24/25  0545 03/24/25  0815 03/24/25  1213 03/24/25  1658   POCGLU 161* 305* 82 250* 383* 330* 264* 309*     Allergy and drug reactions:   Allergies   Allergen Reactions    Vancomycin Shortness Of Breath and Itching       Review of Systems  All systems reviewed. All negative except for symptoms mentioned in HPI     OBJECTIVE    BP (!) 154/86   Pulse 93   Temp 98.1 °F (36.7 °C) (Oral)   Resp 16   Ht 1.499 m (4' 11\")   Wt 66.4 kg (146 lb 6.2 oz)   LMP  (LMP Unknown)   SpO2 100%   BMI 29.57 kg/m²     Intake/Output Summary (Last 24 hours) at 3/24/2025 1935  Last data filed at 3/24/2025 1115  Gross per 24 hour   Intake 300 ml   Output 3300 ml   Net -3000 ml           Physical examination:  General: awake alert, oriented x3  HEENT: normocephalic non traumatic, no exophthalmos   Neck: supple, No thyroid tenderness,  Pulm: good equal air entry no added sounds  CVS: S1 + S2  Abd: soft lax, no tenderness  Skin: warm, no lesions, no rash. No open  with medical renal   disease.   2. Multiple simple cysts in the left kidney, unchanged compared to the   previous CT. These are of no clinical concern.   3. No sign of calculus or hydronephrosis in either kidney.   4. The urinary bladder is collapsed and is otherwise normal in appearance.         XR CHEST (2 VW)   Final Result      XR CHEST PORTABLE   Final Result   1. Limited exam, suspect opacification of right and possibly left side as   discussed above   2. Borderline cardiomegaly   3. Possible subglottic tracheal narrowing      RECOMMENDATION:   Clinical correlation.  PA/lateral chest x-rays or CT to further evaluate is   suggested.             Medical Records/Labs/Images review:   I personally reviewed and summarized previous records   All labs and imaging were reviewed independently     ASSESSMENT & PLAN   Michelle ORDONEZ Lencho Nettles, a 39 y.o.-old female seen today for inpatient diabetes management    Diabetes Mellitus type 1  Her diabetes is extremely brittle and she is under very high risk of hypoglycemia   We recommend the following DM regimen  Lantus 9 units daily in the morning  Humalog to 3 units 3 times daily with meals  Low dose sliding scale   Continue glucose check with meals and at bedtime  Will titrate insulin dose based on the blood glucose trend & insulin requirement  Upon discharge, I will arrange for the patient to be seen in the endocrinology clinic for routine diabetes maintenance and prevention    Dietary noncompliance  Discussed with patient the importance of eating consistent carbohydrate meals, avoiding high glycemic index food. Also, discussed with patient the risk and negative consequences of dietary noncompliance on blood glucose control, blood pressure and weight    Acute on chronic severe anemia:  Patient had multiple EGDs, negative colonoscopy, Neg Meckel's scan.   Plan for pill endoscopy.   General surgery is following    Interdisciplinary plan for communication with healthcare

## 2025-03-24 NOTE — PROGRESS NOTES
Physical Therapy    Pt NA this AM, off the floor at dialysis. Will follow on another date/time.  Kulwinder Amos PTA [id number]

## 2025-03-24 NOTE — PROGRESS NOTES
[facility]   Nephrology Progress Note    Michelle Nettles  03/24/25  5:23 PM    Reason for Consult: ESRD needing dialysis    HISTORY OF PRESENT ILLNESS from the 3/14/25 note:     Michelle Nettles is a 39 y.o. female with a past medical history of anemia of stage V CKD, recent left trimalleolar fracture, ESRD, poorly controlled IDDM, chronic HFpEF, & hidradenitis suppurativa who presented to the ED from [facility] for fatigue with low Hgb.  Patient recently hospitalized at [place] after presenting with N/V/D on 3/3/2024.  Hgb was 5.2.  S/p repeat EGD showed gastritis & Schatzki's ring but no source of bleeding.  S/p explantation of left ankle hardware.  Patient is frustrated she had to return to the hospital once again.  She is conversationally dyspneic.  Over the past 2 days her face has been swelling up.  Was called to the bedside out of concern for transfusion reaction.  Held transfusion for now.  Gave Benadryl, Pepcid and 1 dose of IV Solu-Medrol. .     INTERVAL HX:    3/19/25: Pt awake alert and appropriate. She was seen in dialysis earlier today and was tolerating the procedure well. No c/o abd pain or CP    3/20: Resting comfortably in bed. Notes some increased facial swelling otherwise no new issues reported this AM.    3/21: Patient seen and examined toward end of dialysis treatment.  Procedure well-tolerated.  Spoke with dialysis nurse, prescription reviewed.  No significant complaints.  Tolerated 2 L of ultrafiltration without issue.  Blood pressure slightly above target but otherwise no chest pain or shortness of breath noted.  Blood sugar remains elevated.    3/22: Patient seen and examined.  Resting comfortably in bed not in distress.  She tolerated dialysis yesterday.  However, she states she is not feeling the best today.  Having some left knee pain.    3/23: Patient seen and examined at bedside.  Over the past 24 hours, blood pressures continue to rise  results for input(s): \"TROPONINI\" in the last 72 hours.  BNP: No results for input(s): \"BNP\" in the last 72 hours.  Lipids: No results for input(s): \"CHOL\", \"HDL\" in the last 72 hours.    Invalid input(s): \"LDLCALCU\"  ABGs: No results found for: \"PHART\", \"PO2ART\", \"RUQ6TWA\"  INR: No results for input(s): \"INR\" in the last 72 hours.      Recent Labs     03/22/25  0518   FERRITIN 370   IRON 40   IRONPERSAT 20       ASSESSMENT:        ESRD on dialysis  Patient is scheduled for dialysis on Monday Wednesday and Fridays.   Dialyzed 3/21 2 kg UF  PLAN:  Will continue MWF schedule-next treatment Monday   Will plan for UF tomorrow   2. Electrolytes   Hyponatremia-sodium 143 mmol/L with hyperglycemia blood sugar corrects to normal range  Potassium 5.3 mmol/L  Magnesium 2.1 mg/dL  Calcium 8.3 mg/dL with an albumin of 3.2 g/dL  Phosphorus 4.5 mg/dL   Plan  No changes  Correct K+ with Dialysis  3.Acid base  Last bicarbonate level 24 mmol/L   Last Chloride 104 mmol/dL, AG 15 mmol/L with last albumin of 3.2 g/dL  4.Anemia in CKD-Low Hemoglobin exacerbated by possible GI Loss and Fe++ Def  Hg 7.7 g/dL  Received 5units PRBC since admission  PLAN:  B Defer to Heme/Onc  VALENTIN with HD  5. Sec HPTH of Renal Origin-Ca++ WNL with hypophosphatemia not on a binder  Phosphorus excellent today   PLAN:               B Follow PO4, Mg++              C follow ca++              D. PO4 back to WNL post supplement    6. Hyperglycemia  Persist but improving  PLAN: defer to primary/Endocrinology    7. HTN w/CKD G5/ESRD  -BP continues to rise 160s to 80s over 60s to 70s  -Will add amlodipine 5 mg daily to present regimen  -Challenge ultrafiltration tomorrow       Elias Draper MD  5:23 PM  3/24/2025       Alert and oriented to person, place and time

## 2025-03-24 NOTE — PROGRESS NOTES
[facility] Hospitalist Progress Note    Admitting Date and Time: 3/13/2025  8:02 PM  Admit Dx: Acute on chronic anemia [D64.9]  Severe anemia [D64.9]    Subjective:  Patient is being followed for Acute on chronic anemia [D64.9]  Severe anemia [D64.9]   Pt getting hd, feels better.   3/23 - Patient requesting if possible get cardiac and surgical ( scopes) done IP .  Per RN: no major concerns.     ROS: denies fever, chills, cp, sob, n/v, HA unless stated above.      carvedilol  25 mg Oral BID WC    amLODIPine  5 mg Oral Daily    sodium chloride flush  5-40 mL IntraVENous 2 times per day    insulin glargine  9 Units SubCUTAneous QAM    insulin lispro  3 Units SubCUTAneous TID WC    pantoprazole (PROTONIX) 40 mg in sodium chloride (PF) 0.9 % 10 mL injection  40 mg IntraVENous Daily    sodium chloride flush  5-40 mL IntraVENous 2 times per day    insulin lispro  0-4 Units SubCUTAneous 4x Daily AC & HS    epoetin nadya-epbx  10,000 Units SubCUTAneous Once per day on Monday Wednesday Friday     polyethylene glycol, 17 g, Daily PRN  metoprolol tartrate, 50 mg, PRN   Or  metoprolol tartrate, 100 mg, PRN   Or  metoprolol tartrate, 150 mg, PRN  nitroGLYCERIN, 0.4 mg, PRN   Or  nitroGLYCERIN, 0.8 mg, PRN  sodium chloride flush, 5-40 mL, PRN  sodium chloride, , PRN  HYDROcodone 5 mg - acetaminophen, 1 tablet, Q6H PRN  calcium carbonate, 1,000 mg, TID PRN  albuterol, 5 mg, 4x Daily PRN  bisacodyl, 10 mg, Daily PRN  diphenhydrAMINE, 25 mg, Q6H PRN  sodium chloride flush, 5-40 mL, PRN  sodium chloride, , PRN  ondansetron, 4 mg, Q8H PRN   Or  ondansetron, 4 mg, Q6H PRN  melatonin, 3 mg, Nightly PRN  acetaminophen, 650 mg, Q6H PRN   Or  acetaminophen, 650 mg, Q6H PRN  nicotine polacrilex, 2 mg, Q1H PRN  glucose, 4 tablet, PRN  dextrose bolus, 125 mL, PRN   Or  dextrose bolus, 250 mL, PRN  glucagon (rDNA), 1 mg, PRN  dextrose, , Continuous PRN  EPINEPHrine PF, 0.3 mg, PRN  HYDROmorphone, 0.2 mg, Q4H PRN  sodium chloride, ,  setting of ESRD, denies chest pain, monitor  Hypoglycemia - on ISS, hold premeal & basal Insulin.  Chest pain - elevated trops, non specific changes on EKG, yesterday reported SOB with chest heaviness, today reports chest discomfort again, not improved with tums. Cardiology consulted. Noted echo -ef- 55% , plans for ischemia workup coronary CT angiogram/ stress OP.    Dvt Prophylaxis - SCDs  Dispo  - Currently from [facility] and plan to go to [facility] , accepted. Will need updated therapies on dc.     NOTE: This report was transcribed using voice recognition software. Every effort was made to ensure accuracy; however, inadvertent computerized transcription errors may be present.  Electronically signed by Rachael Salcido MD on 3/24/2025 at 11:37 AM

## 2025-03-24 NOTE — PROGRESS NOTES
P Quality Flow/Interdisciplinary Rounds Progress Note        Quality Flow Rounds held on March 24, 2025    Disciplines Attending:  Bedside Nurse, , , and Nursing Unit Leadership    Michelle JOSÉ LUIS Nettles was admitted on 3/13/2025  8:02 PM    Anticipated Discharge Date:       Disposition:    Edgar Score:  Edgar Scale Score: 19    [facility] RISK OF UNPLANNED READMISSION 2.0             38.2 Total Score        Discussed patient goal for the day, patient clinical progression, and barriers to discharge.  The following Goal(s) of the Day/Commitment(s) have been identified:   plan for UF tomorrow, discharge planning      Walker Gold, RN  March 24, 2025

## 2025-03-24 NOTE — CARE COORDINATION
Updated plan of care. HD today, plan for ultra filtration in am. Awaiting cardio input. Plan is [facility] when pt is stable. No pre-cert needed. HD approved. Will follow-mjo

## 2025-03-24 NOTE — PROGRESS NOTES
[facility] cardiology NP to inquire on timing of cardiac workup. Patient can be evaluated further in the outpatient setting.

## 2025-03-24 NOTE — PROGRESS NOTES
Occupational Therapy  Pt off the unit for dialysis.  Will attempt another time.   King CARBALLO/RAD [id number]

## 2025-03-25 VITALS
BODY MASS INDEX: 28.58 KG/M2 | RESPIRATION RATE: 16 BRPM | OXYGEN SATURATION: 100 % | DIASTOLIC BLOOD PRESSURE: 102 MMHG | HEIGHT: 59 IN | WEIGHT: 141.76 LBS | HEART RATE: 91 BPM | TEMPERATURE: 98.6 F | SYSTOLIC BLOOD PRESSURE: 121 MMHG

## 2025-03-25 LAB
GLUCOSE BLD-MCNC: 321 MG/DL (ref 74–99)
GLUCOSE BLD-MCNC: 328 MG/DL (ref 74–99)
GLUCOSE BLD-MCNC: 351 MG/DL (ref 74–99)

## 2025-03-25 PROCEDURE — 3E1M39Z IRRIGATION OF PERITONEAL CAVITY USING DIALYSATE, PERCUTANEOUS APPROACH: ICD-10-PCS | Performed by: HOSPITALIST

## 2025-03-25 PROCEDURE — 6370000000 HC RX 637 (ALT 250 FOR IP): Performed by: STUDENT IN AN ORGANIZED HEALTH CARE EDUCATION/TRAINING PROGRAM

## 2025-03-25 PROCEDURE — 6370000000 HC RX 637 (ALT 250 FOR IP): Performed by: SURGERY

## 2025-03-25 PROCEDURE — 6360000002 HC RX W HCPCS: Performed by: SURGERY

## 2025-03-25 PROCEDURE — 82962 GLUCOSE BLOOD TEST: CPT

## 2025-03-25 PROCEDURE — 2500000003 HC RX 250 WO HCPCS: Performed by: SURGERY

## 2025-03-25 PROCEDURE — 97530 THERAPEUTIC ACTIVITIES: CPT

## 2025-03-25 PROCEDURE — 99239 HOSP IP/OBS DSCHRG MGMT >30: CPT | Performed by: STUDENT IN AN ORGANIZED HEALTH CARE EDUCATION/TRAINING PROGRAM

## 2025-03-25 PROCEDURE — 6370000000 HC RX 637 (ALT 250 FOR IP): Performed by: INTERNAL MEDICINE

## 2025-03-25 PROCEDURE — 90945 DIALYSIS ONE EVALUATION: CPT

## 2025-03-25 PROCEDURE — 97535 SELF CARE MNGMENT TRAINING: CPT

## 2025-03-25 RX ORDER — LIDOCAINE 4 G/G
1 PATCH TOPICAL DAILY
Qty: 7 EACH | Refills: 0 | Status: SHIPPED | OUTPATIENT
Start: 2025-03-25 | End: 2025-04-01

## 2025-03-25 RX ORDER — AMLODIPINE BESYLATE 5 MG/1
10 TABLET ORAL DAILY
Qty: 60 TABLET | Refills: 3 | Status: SHIPPED | OUTPATIENT
Start: 2025-03-25

## 2025-03-25 RX ORDER — HYDROCODONE BITARTRATE AND ACETAMINOPHEN 5; 325 MG/1; MG/1
1 TABLET ORAL EVERY 6 HOURS PRN
Qty: 9 TABLET | Refills: 0 | Status: SHIPPED | OUTPATIENT
Start: 2025-03-25 | End: 2025-03-28

## 2025-03-25 RX ORDER — INSULIN GLARGINE 100 [IU]/ML
9 INJECTION, SOLUTION SUBCUTANEOUS EVERY MORNING
Qty: 10 ML | Refills: 3 | Status: SHIPPED | OUTPATIENT
Start: 2025-03-25

## 2025-03-25 RX ADMIN — INSULIN LISPRO 3 UNITS: 100 INJECTION, SOLUTION INTRAVENOUS; SUBCUTANEOUS at 12:29

## 2025-03-25 RX ADMIN — HYDROCODONE BITARTRATE AND ACETAMINOPHEN 1 TABLET: 5; 325 TABLET ORAL at 06:53

## 2025-03-25 RX ADMIN — INSULIN LISPRO 4 UNITS: 100 INJECTION, SOLUTION INTRAVENOUS; SUBCUTANEOUS at 12:29

## 2025-03-25 RX ADMIN — SODIUM CHLORIDE, PRESERVATIVE FREE 10 ML: 5 INJECTION INTRAVENOUS at 12:31

## 2025-03-25 RX ADMIN — INSULIN GLARGINE 9 UNITS: 100 INJECTION, SOLUTION SUBCUTANEOUS at 12:28

## 2025-03-25 RX ADMIN — HYDROCODONE BITARTRATE AND ACETAMINOPHEN 1 TABLET: 5; 325 TABLET ORAL at 13:37

## 2025-03-25 RX ADMIN — INSULIN LISPRO 3 UNITS: 100 INJECTION, SOLUTION INTRAVENOUS; SUBCUTANEOUS at 06:38

## 2025-03-25 RX ADMIN — HYDROMORPHONE HYDROCHLORIDE 0.2 MG: 1 INJECTION, SOLUTION INTRAMUSCULAR; INTRAVENOUS; SUBCUTANEOUS at 12:27

## 2025-03-25 RX ADMIN — AMLODIPINE BESYLATE 5 MG: 5 TABLET ORAL at 12:28

## 2025-03-25 RX ADMIN — SODIUM CHLORIDE, PRESERVATIVE FREE 40 MG: 5 INJECTION INTRAVENOUS at 12:27

## 2025-03-25 ASSESSMENT — PAIN SCALES - GENERAL
PAINLEVEL_OUTOF10: 8
PAINLEVEL_OUTOF10: 9
PAINLEVEL_OUTOF10: 8
PAINLEVEL_OUTOF10: 0

## 2025-03-25 ASSESSMENT — PAIN DESCRIPTION - LOCATION
LOCATION: KNEE;LEG
LOCATION: LEG
LOCATION: LEG

## 2025-03-25 ASSESSMENT — PAIN DESCRIPTION - ORIENTATION
ORIENTATION: LEFT

## 2025-03-25 ASSESSMENT — PAIN DESCRIPTION - DESCRIPTORS
DESCRIPTORS: ACHING
DESCRIPTORS: ACHING;DISCOMFORT;SORE
DESCRIPTORS: ACHING

## 2025-03-25 NOTE — PROGRESS NOTES
Attempted to phone nurse to nurse X3 without success.  No answer at nurses' station.  Phoned back and notified  no answer.     LOV 6/8/2021.  testosterone 1.62 % gel pump 75 g 0 6/23/2022 7/23/2022    Sig - Route: Place 1 Pump onto the skin daily. (Approx. 1 pump)  Please schedule physical prior to next refill. - Transdermal    Sent to pharmacy as: Testosterone 1.62 % Transdermal Gel      tadalafil (CIALIS) 20 MG tablet 23 tablet 3 6/23/2022     Sig - Route: Take 1 tablet by mouth as needed for Erectile Dysfunction. - Oral    Sent to pharmacy as: Tadalafil 20 MG Oral Tablet (CIALIS)      Rx denied. Pt needs appointment.

## 2025-03-25 NOTE — PROGRESS NOTES
Physical Therapy  Facility/Department: [address] INTERMDIATE MED SURG  Daily Treatment Note  NAME: Michelle Nettles  : 1985  MRN: [id number]    Date of Service: 3/25/2025    Patient Diagnosis(es): The primary encounter diagnosis was Acute on chronic anemia. Diagnoses of Hyperglycemia, Peptic ulcer disease, ESRD on dialysis ([facility]), Chronic systolic (congestive) heart failure ([facility]), Chest pain, unspecified type, Shortness of breath, Abnormal electrocardiography, and Trimalleolar fracture of ankle, closed, left, initial encounter were also pertinent to this visit.      Evaluating PT: Sheldon Flaherty, PT, DPT [id number]        Room #:  0728/0728-A  Diagnosis:  Acute on chronic anemia [D64.9]  Severe anemia [D64.9]  PMHx/PSHx:   has a past medical history of JOLLY (acute kidney injury), Anemia, AVF (arteriovenous fistula), Cellulitis, face, CHF (congestive heart failure) ([facility]), Chronic kidney disease, Chronic respiratory failure with hypoxia ([facility]), Dialysis AV fistula malfunction, initial encounter, Displaced trimalleolar fracture of left lower leg, closed, initial encounter, DM type 1 (diabetes mellitus, type 1) ([facility]), Encounter regarding vascular access for dialysis for ESRD ([facility]), ESRD (end stage renal disease) ([facility]), Hemodialysis patient, Hidradenitis suppurativa, Hypercalcemia, Hyperkalemia, Hypertension, Iron deficiency anemia secondary to blood loss (chronic), Other acute gastritis without hemorrhage, Tobacco abuse, Type 2 diabetes mellitus with hyperglycemia ([facility]), and Vitamin B-complex deficiency.  Pertinent Information:  Left ankle ORIF 3/7  Precautions:  Fall risk, NWB LLE, HD  Equipment Needs:  NA     SUBJECTIVE:     Pt was admitted from [facility]. Pt was completing stand pivot transfers without hands on assist. Pt was used WC as means of mobility.     OBJECTIVE:    Initial Evaluation  Date: 3/21/25 Treatment Date:  3/25 Short Term/ Long Term   Goals   AM-PAC 6 Clicks      Was pt agreeable to

## 2025-03-25 NOTE — DISCHARGE SUMMARY
[facility] Hospitalist Physician Discharge Summary       Balaji Gonzalez MD  [address] [facility]  [facility] [address]  [phone]    Schedule an appointment as soon as possible for a visit in 3 day(s)  F/u hospital - anemia    Sadi Owens, DO  [address] [facility]  [address]  [facility] [address]  [phone]    Schedule an appointment as soon as possible for a visit in 3 day(s)  hospital followup, need Rod Wilde MD  [address] [facility] [address]  [phone]    Schedule an appointment as soon as possible for a visit        Activity level: As tolerated     Dispo: SNF      Condition on discharge: Stable     Patient ID:  Michelle Nettles  [id number]  39 y.o.  1985    Admit date: 3/13/2025    Discharge date and time:  3/25/2025  9:44 AM    Admission Diagnoses: Principal Problem:    Anemia  Active Problems:    Chronic systolic (congestive) heart failure    Hidradenitis suppurativa    History of venous thromboembolism    MRSA colonization    ESRD needing dialysis (HCC)    Chronic hypoxic respiratory failure, on home oxygen therapy (HCC)    Poorly controlled type 2 diabetes mellitus (HCC)    Acute superficial gastritis without hemorrhage    Schatzki's ring of distal esophagus    Presence of artificial lower extremity    Severe anemia    Chest pain  Resolved Problems:    * No resolved hospital problems. *      Discharge Diagnoses: Principal Problem:    Anemia  Active Problems:    Chronic systolic (congestive) heart failure    Hidradenitis suppurativa    History of venous thromboembolism    MRSA colonization    ESRD needing dialysis (HCC)    Chronic hypoxic respiratory failure, on home oxygen therapy (HCC)    Poorly controlled type 2 diabetes mellitus (HCC)    Acute superficial gastritis without hemorrhage    Schatzki's ring of distal esophagus    Presence of artificial lower extremity    Severe anemia    Chest  Misc  Check HR and pulse ox daily and PRN symptoms     sucralfate 1 GM tablet  Commonly known as: CARAFATE  Take 1 tablet by mouth 4 times daily (before meals and nightly)     tiZANidine 4 MG tablet  Commonly known as: ZANAFLEX  Take 1 tablet by mouth daily as needed (before PT OT evaluation for pain relief)     vitamin B-12 500 MCG tablet  Commonly known as: CYANOCOBALAMIN  Take 1 tablet by mouth daily           * This list has 2 medication(s) that are the same as other medications prescribed for you. Read the directions carefully, and ask your doctor or other care provider to review them with you.                STOP taking these medications      aspirin 81 MG EC tablet     clopidogrel 75 MG tablet  Commonly known as: PLAVIX     enoxaparin Sodium 30 MG/0.3ML injection  Commonly known as: LOVENOX     HYDROcodone-acetaminophen 5-325 MG per tablet  Commonly known as: NORCO     Lantus SoloStar 100 UNIT/ML injection pen  Generic drug: insulin glargine  Replaced by: insulin glargine 100 UNIT/ML injection vial     sodium chloride 0.65 % nasal spray  Commonly known as: MIRYAM, BABY AYR     tuberculin 5 UNIT/0.1ML injection               Where to Get Your Medications        These medications were sent to [id number] - [place], [place] - [address] - P [phone] - F [phone]  [facility] [address] [facility] [address]      Phone: [phone]   amLODIPine 5 MG tablet  insulin glargine 100 UNIT/ML injection vial  lidocaine 4 % external patch  tiZANidine 4 MG tablet           Note that more than 30 minutes was spent in preparing discharge papers, discussing discharge with patient, medication review, etc.    Signed:  Electronically signed by Rachael Salcido MD on 3/25/2025 at 9:44 AM

## 2025-03-25 NOTE — PROGRESS NOTES
Occupational Therapy  OT BEDSIDE TREATMENT NOTE      Date:3/25/2025  Patient Name: Michelle Nettles  MRN: [id number]  : 1985  Room: [address] [place] [address]         Evaluating OT: Nisha Cameron OTR/L   [id number]       Referring Provider:Rachael Salcido MD     Specific Provider Orders/Date:OT eval and treat 3/2025       Diagnosis:  Acute on chronic anemia [D64.9]  Severe anemia [D64.9]     Pertinent Medical History: CHF, ESRD, dialysis, L ankle ORIF 3/7/2025     Precautions:  Fall Risk, NWB L LE, O2       Assessment of current deficits    [x] Functional mobility            [x]ADLs           [x] Strength                  []Cognition    [x] Functional transfers          [x] IADLs         [x] Safety Awareness   [x]Endurance    [] Fine Coordination                         [x] Balance      [] Vision/perception   []Sensation      []Gross Motor Coordination             [] ROM           [] Delirium                   [] Motor Control      OT PLAN OF CARE   OT POC based on physician orders, patient diagnosis and results of clinical assessment     Frequency/Duration  2-3 days/wk for 2 - 4weeks PRN   Specific OT Treatment Interventions to include:   ADL retraining/adapted techniques and AE recommendations to increase functional independence within precautions                    Energy conservation techniques to improve tolerance for selfcare routine   Functional transfer/mobility training/DME recommendations for increased independence, safety and fall prevention         Patient/family education to increase safety and functional independence             Environmental modifications for safe mobility and completion of ADLs                             Therapeutic activity to improve functional performance during ADLs.                                         Therapeutic exercise to improve tolerance and functional strength for ADLs    Balance retraining/tolerance tasks for facilitation of postural control with dynamic challenges  during ADLs .      Positioning to improve functional independence         Recommended Adaptive Equipment: wheelchair      SOCIAL:  patient admitted from [facility] , using wheelchair for mobility, SPT   Home Living: Pt lives with 11 year old daughter, 1 story       Prior Level of Function: Independent  with ADLs , and  with IADLs; ambulated with no device      Pain Level: Pt did not report pain level.   Cognition: Awake and alert.                  Functional Assessment:  AM-PAC Daily Activity Raw Score: 16/24    Initial Eval Status  Date: 3/21/2025 Treatment Status  Date:3/25/25  STGs = LTGs  Time frame: 10-14 days   Feeding Independent       Grooming Set-up ,seated  Setup seated.   Independent   UB Dressing Set-up   Independent    LB Dressing Min A  Able to don R sock while seated EOB  Min A   Supervision    Bathing Min A    Supervision    Toileting SBA  Mod A   Assist for denae hygiene when standing.   Assist with clothing management due to B UE support on walker to maintain NWB while standing.   Independent    Bed Mobility  SBA  Supine <> sit  Supervision   Independent   Functional Transfers Min A  Sit-stand from bed  Min A from BSC.  Cues for hand placement to increase safety.     Supervision    Functional Mobility PTA:  using wheelchair  Pivot only using walker CGA.    Independent at wheelchair level    Balance Sitting:     Static:  Independent    Dynamic:supervision   Standing: SBA  Static stand balance CGA during ADL.  B UE support on walker when standing to maintain NWB.   Independent/supervisiom    Activity Tolerance No SOB observed   O2 Fair   Good  with ADL activity    Visual/  Perceptual Glasses: none observed           UE ROM/strength  AROM present throughout   AROM noted.  Pt with L UE edema.  Encouraged AROM.  Tolerate UE therapeutic activity/exercises to increase strength/endurance for ADL/xfer activity     Comments:  Pt back from dialysis.  Case management requesting updated therapy notes.  Pt agreeable to

## 2025-03-25 NOTE — FLOWSHEET NOTE
03/25/25 1155   Vital Signs   BP (!) 186/88   Temp 97.8 °F (36.6 °C)   Pulse 95   Respirations 16   Weight - Scale 64.3 kg (141 lb 12.1 oz)   Weight Method Stated   Percent Weight Change -5.02   Pain Assessment   Pain Assessment None - Denies Pain   Pain Level 0   Hemodialysis Fistula/Graft Arteriovenous fistula Left Arm   Placement Date/Time: 01/10/25 1135   Present on Admission/Arrival: Yes  Access Type: Arteriovenous fistula  Orientation: Left  Access Location: Arm   Site Assessment Clean, dry & intact   Thrill Present   Bruit Present   Post-Hemodialysis Assessment   Post-Treatment Procedures Blood returned;Access bleeding time < 10 minutes   Machine Disinfection Process Exterior Machine Disinfection   Rinseback Volume (ml) 300 ml   Blood Volume Processed (Liters) 0 L   Dialyzer Clearance Lightly streaked   Duration of Treatment (minutes) 180 minutes   Heparin Amount Administered During Treatment (mL) 0 mL   Hemodialysis Intake (ml) 300 ml   Hemodialysis Output (ml) 3000 ml   NET Removed (ml) 2700   Tolerated Treatment Good   Patient Response to Treatment pt tolerated tx well 2700mL removed stasis achieved bruit/thrill noted nurse to nurse report given to floor RN pt/vitals stable upon d/c dialysis unit   Patient Disposition Return to room

## 2025-03-25 NOTE — PROGRESS NOTES
[facility]   Nephrology Progress Note    Michelle Nettles  03/25/25  5:50 PM    Reason for Consult: ESRD needing dialysis    HISTORY OF PRESENT ILLNESS from the 3/14/25 note:     Michelle Nettles is a 39 y.o. female with a past medical history of anemia of stage V CKD, recent left trimalleolar fracture, ESRD, poorly controlled IDDM, chronic HFpEF, & hidradenitis suppurativa who presented to the ED from [facility] for fatigue with low Hgb.  Patient recently hospitalized at [facility] after presenting with N/V/D on 3/3/2024.  Hgb was 5.2.  S/p repeat EGD showed gastritis & Schatzki's ring but no source of bleeding.  S/p explantation of left ankle hardware.  Patient is frustrated she had to return to the hospital once again.  She is conversationally dyspneic.  Over the past 2 days her face has been swelling up.  Was called to the bedside out of concern for transfusion reaction.  Held transfusion for now.  Gave Benadryl, Pepcid and 1 dose of IV Solu-Medrol. .     INTERVAL HX:    3/19/25: Pt awake alert and appropriate. She was seen in dialysis earlier today and was tolerating the procedure well. No c/o abd pain or CP    3/20: Resting comfortably in bed. Notes some increased facial swelling otherwise no new issues reported this AM.    3/21: Patient seen and examined toward end of dialysis treatment.  Procedure well-tolerated.  Spoke with dialysis nurse, prescription reviewed.  No significant complaints.  Tolerated 2 L of ultrafiltration without issue.  Blood pressure slightly above target but otherwise no chest pain or shortness of breath noted.  Blood sugar remains elevated.    3/22: Patient seen and examined.  Resting comfortably in bed not in distress.  She tolerated dialysis yesterday.  However, she states she is not feeling the best today.  Having some left knee pain.    3/23: Patient seen and examined at bedside.  Over the past 24 hours, blood pressures continue to rise       Hepatic:   Recent Labs     03/23/25  0450 03/24/25  0730   AST 18 13   ALT 6 6   BILITOT 0.3 0.2   ALKPHOS 105* 115*       Troponin: No results for input(s): \"TROPONINI\" in the last 72 hours.  BNP: No results for input(s): \"BNP\" in the last 72 hours.  Lipids: No results for input(s): \"CHOL\", \"HDL\" in the last 72 hours.    Invalid input(s): \"LDLCALCU\"  ABGs: No results found for: \"PHART\", \"PO2ART\", \"HWH5INV\"  INR: No results for input(s): \"INR\" in the last 72 hours.      No results for input(s): \"CAION\", \"FERRITIN\", \"IRON\", \"IRONPERSAT\", \"VITD25\", \"IPTH\" in the last 72 hours.      ASSESSMENT:     ESRD on dialysis  Patient is scheduled for dialysis on Monday Wednesday and Fridays.   Dialyzed 3/21 2 kg UF  PLAN:  Will continue MWF schedule-next treatment today   Seen on HD today  Ok to discharge from a nephro stand point    Electrolytes   Hyponatremia-sodium 143 mmol/L with hyperglycemia blood sugar corrects to normal range  Potassium 5.3 mmol/L  Magnesium 2.1 mg/dL  Calcium 8.3 mg/dL with an albumin of 3.2 g/dL  Phosphorus 4.5 mg/dL   Plan  No changes  Correct K+ with Dialysis  Acid base  Last bicarbonate level 24 mmol/L   Last Chloride 104 mmol/dL, AG 15 mmol/L with last albumin of 3.2 g/dL    Anemia in CKD-Low Hemoglobin exacerbated by possible GI Loss and Fe++ Def  Hg 7.7 g/dL  Received 5units PRBC since admission  PLAN:  B Defer to Heme/Onc  VALENTIN with HD    Sec HPTH of Renal Origin-Ca++ WNL with hypophosphatemia not on a binder  Phosphorus excellent today   PLAN:  Follow PO4, Mg++   follow ca++  PO4 back to WNL post supplement    Hyperglycemia  Persist but improving  PLAN: defer to primary/Endocrinology    HTN w/CKD G5/ESRD  -BP continues to rise 160s to 80s over 60s to 70s  -Will add amlodipine 5 mg daily to present regimen    Elias Draper MD  5:50 PM  3/25/2025

## 2025-03-25 NOTE — PLAN OF CARE
Problem: Chronic Conditions and Co-morbidities  Goal: Patient's chronic conditions and co-morbidity symptoms are monitored and maintained or improved  3/24/2025 2216 by Kaitlin Banda RN  Outcome: Progressing  Flowsheets (Taken 3/24/2025 2115)  Care Plan - Patient's Chronic Conditions and Co-Morbidity Symptoms are Monitored and Maintained or Improved: Monitor and assess patient's chronic conditions and comorbid symptoms for stability, deterioration, or improvement  3/24/2025 1856 by Malik Page RN  Outcome: Progressing     Problem: Discharge Planning  Goal: Discharge to home or other facility with appropriate resources  3/24/2025 2216 by Kaitlin Banda RN  Outcome: Progressing  3/24/2025 1856 by Malik Page RN  Outcome: Progressing     Problem: Pain  Goal: Verbalizes/displays adequate comfort level or baseline comfort level  3/24/2025 2216 by Kaitlin Banda RN  Outcome: Progressing  3/24/2025 1856 by Malik Page RN  Outcome: Progressing     Problem: Safety - Adult  Goal: Free from fall injury  3/24/2025 2216 by Kaitlin Banda RN  Outcome: Progressing  3/24/2025 1856 by Malik Page RN  Outcome: Progressing     Problem: Skin/Tissue Integrity  Goal: Skin integrity remains intact  Description: 1.  Monitor for areas of redness and/or skin breakdown  2.  Assess vascular access sites hourly  3.  Every 4-6 hours minimum:  Change oxygen saturation probe site  4.  Every 4-6 hours:  If on nasal continuous positive airway pressure, respiratory therapy assess nares and determine need for appliance change or resting period  3/24/2025 2216 by Kaitlin Banda RN  Outcome: Progressing  3/24/2025 1856 by Malik Page RN  Outcome: Progressing

## 2025-03-25 NOTE — PROGRESS NOTES
Consulted for blood draw. Patient currently off unit, in dialysis. Please notify VAT 2 hours post dialysis for lab draw. Discussed with JENNIFER Cardoso charge.  Electronically signed by Ciera Sykes RN on 3/25/2025 at 11:10 AM

## 2025-03-25 NOTE — CARE COORDINATION
Updated plan of care. Discharge today to [facility]. Facility to transport. Updated pt and staff-octaviao

## 2025-03-25 NOTE — PROGRESS NOTES
Occupational Therapy  Arrived to pt's room.  She was eating her breakfast.  She had several questions regarding DME needed when she goes home.  Explained that any equipment see needs will be ordered from rehab facility when she returns home from there.  Pt to finish eating then agreeable to participate in OT session.  Returned to pt room.  Off the unit for dialysis.  Will continue to attempt treatment as pt available.   King CARBALLO/RAD [id number]

## 2025-03-28 ENCOUNTER — HOSPITAL ENCOUNTER (EMERGENCY)
Age: 40
Discharge: HOME | End: 2025-03-29
Attending: STUDENT IN AN ORGANIZED HEALTH CARE EDUCATION/TRAINING PROGRAM
Payer: MEDICARE

## 2025-03-28 ENCOUNTER — TELEPHONE (OUTPATIENT)
Dept: ADMINISTRATIVE | Age: 40
End: 2025-03-28

## 2025-03-28 DIAGNOSIS — R73.9 HYPERGLYCEMIA: Primary | ICD-10-CM

## 2025-03-28 DIAGNOSIS — Z99.2 ESRD ON DIALYSIS (HCC): ICD-10-CM

## 2025-03-28 DIAGNOSIS — M25.572 LEFT ANKLE PAIN, UNSPECIFIED CHRONICITY: ICD-10-CM

## 2025-03-28 DIAGNOSIS — N18.6 ESRD ON DIALYSIS (HCC): ICD-10-CM

## 2025-03-28 PROCEDURE — 99285 EMERGENCY DEPT VISIT HI MDM: CPT

## 2025-03-29 ENCOUNTER — APPOINTMENT (OUTPATIENT)
Dept: GENERAL RADIOLOGY | Age: 40
End: 2025-03-29
Payer: MEDICARE

## 2025-03-29 VITALS
HEART RATE: 99 BPM | TEMPERATURE: 97.9 F | OXYGEN SATURATION: 100 % | HEIGHT: 59 IN | DIASTOLIC BLOOD PRESSURE: 96 MMHG | BODY MASS INDEX: 30.24 KG/M2 | WEIGHT: 150 LBS | SYSTOLIC BLOOD PRESSURE: 193 MMHG | RESPIRATION RATE: 17 BRPM

## 2025-03-29 LAB
ALBUMIN SERPL-MCNC: 3.3 G/DL (ref 3.5–5.2)
ALP SERPL-CCNC: 112 U/L (ref 35–104)
ALT SERPL-CCNC: 7 U/L (ref 0–32)
ANION GAP SERPL CALCULATED.3IONS-SCNC: 14 MMOL/L (ref 7–16)
AST SERPL-CCNC: 15 U/L (ref 0–31)
B-OH-BUTYR SERPL-MCNC: 0.12 MMOL/L (ref 0.02–0.27)
BASOPHILS # BLD: 0.02 K/UL (ref 0–0.2)
BASOPHILS NFR BLD: 0 % (ref 0–2)
BILIRUB SERPL-MCNC: 0.3 MG/DL (ref 0–1.2)
BUN SERPL-MCNC: 33 MG/DL (ref 6–20)
CALCIUM SERPL-MCNC: 9.3 MG/DL (ref 8.6–10.2)
CHLORIDE SERPL-SCNC: 93 MMOL/L (ref 98–107)
CHP ED QC CHECK: NORMAL
CO2 SERPL-SCNC: 25 MMOL/L (ref 22–29)
CREAT SERPL-MCNC: 5.9 MG/DL (ref 0.5–1)
EKG ATRIAL RATE: 111 BPM
EKG P AXIS: 54 DEGREES
EKG P-R INTERVAL: 120 MS
EKG Q-T INTERVAL: 328 MS
EKG QRS DURATION: 74 MS
EKG QTC CALCULATION (BAZETT): 446 MS
EKG R AXIS: 7 DEGREES
EKG T AXIS: 95 DEGREES
EKG VENTRICULAR RATE: 111 BPM
EOSINOPHIL # BLD: 0.19 K/UL (ref 0.05–0.5)
EOSINOPHILS RELATIVE PERCENT: 4 % (ref 0–6)
ERYTHROCYTE [DISTWIDTH] IN BLOOD BY AUTOMATED COUNT: 16.8 % (ref 11.5–15)
GFR, ESTIMATED: 9 ML/MIN/1.73M2
GLUCOSE BLD-MCNC: 105 MG/DL
GLUCOSE BLD-MCNC: 105 MG/DL
GLUCOSE BLD-MCNC: 105 MG/DL (ref 74–99)
GLUCOSE BLD-MCNC: 118 MG/DL
GLUCOSE BLD-MCNC: 118 MG/DL (ref 74–99)
GLUCOSE BLD-MCNC: 48 MG/DL (ref 74–99)
GLUCOSE BLD-MCNC: 52 MG/DL (ref 74–99)
GLUCOSE BLD-MCNC: 83 MG/DL (ref 74–99)
GLUCOSE BLD-MCNC: 84 MG/DL (ref 74–99)
GLUCOSE SERPL-MCNC: 616 MG/DL (ref 74–99)
HCT VFR BLD AUTO: 26 % (ref 34–48)
HGB BLD-MCNC: 8.1 G/DL (ref 11.5–15.5)
IMM GRANULOCYTES # BLD AUTO: <0.03 K/UL (ref 0–0.58)
IMM GRANULOCYTES NFR BLD: 0 % (ref 0–5)
LYMPHOCYTES NFR BLD: 0.83 K/UL (ref 1.5–4)
LYMPHOCYTES RELATIVE PERCENT: 15 % (ref 20–42)
MCH RBC QN AUTO: 29.6 PG (ref 26–35)
MCHC RBC AUTO-ENTMCNC: 31.2 G/DL (ref 32–34.5)
MCV RBC AUTO: 94.9 FL (ref 80–99.9)
MONOCYTES NFR BLD: 0.43 K/UL (ref 0.1–0.95)
MONOCYTES NFR BLD: 8 % (ref 2–12)
NEUTROPHILS NFR BLD: 73 % (ref 43–80)
NEUTS SEG NFR BLD: 3.96 K/UL (ref 1.8–7.3)
PH VENOUS: 7.49 (ref 7.35–7.45)
PLATELET # BLD AUTO: 161 K/UL (ref 130–450)
PMV BLD AUTO: 10.1 FL (ref 7–12)
POTASSIUM SERPL-SCNC: 4.2 MMOL/L (ref 3.5–5)
PROT SERPL-MCNC: 6.7 G/DL (ref 6.4–8.3)
RBC # BLD AUTO: 2.74 M/UL (ref 3.5–5.5)
SODIUM SERPL-SCNC: 132 MMOL/L (ref 132–146)
TROPONIN I SERPL HS-MCNC: 117 NG/L (ref 0–9)
TROPONIN I SERPL HS-MCNC: 119 NG/L (ref 0–9)
WBC OTHER # BLD: 5.4 K/UL (ref 4.5–11.5)

## 2025-03-29 PROCEDURE — 6370000000 HC RX 637 (ALT 250 FOR IP)

## 2025-03-29 PROCEDURE — 84484 ASSAY OF TROPONIN QUANT: CPT

## 2025-03-29 PROCEDURE — 93005 ELECTROCARDIOGRAM TRACING: CPT

## 2025-03-29 PROCEDURE — 82962 GLUCOSE BLOOD TEST: CPT

## 2025-03-29 PROCEDURE — 80053 COMPREHEN METABOLIC PANEL: CPT

## 2025-03-29 PROCEDURE — 6360000002 HC RX W HCPCS: Performed by: STUDENT IN AN ORGANIZED HEALTH CARE EDUCATION/TRAINING PROGRAM

## 2025-03-29 PROCEDURE — 82010 KETONE BODYS QUAN: CPT

## 2025-03-29 PROCEDURE — 96375 TX/PRO/DX INJ NEW DRUG ADDON: CPT

## 2025-03-29 PROCEDURE — 2580000003 HC RX 258

## 2025-03-29 PROCEDURE — 96374 THER/PROPH/DIAG INJ IV PUSH: CPT

## 2025-03-29 PROCEDURE — 71045 X-RAY EXAM CHEST 1 VIEW: CPT

## 2025-03-29 PROCEDURE — 82800 BLOOD PH: CPT

## 2025-03-29 PROCEDURE — 85025 COMPLETE CBC W/AUTO DIFF WBC: CPT

## 2025-03-29 RX ORDER — FENTANYL CITRATE 50 UG/ML
50 INJECTION, SOLUTION INTRAMUSCULAR; INTRAVENOUS ONCE
Status: COMPLETED | OUTPATIENT
Start: 2025-03-29 | End: 2025-03-29

## 2025-03-29 RX ORDER — DEXTROSE MONOHYDRATE 100 MG/ML
INJECTION, SOLUTION INTRAVENOUS
Status: COMPLETED
Start: 2025-03-29 | End: 2025-03-29

## 2025-03-29 RX ORDER — FENTANYL CITRATE 50 UG/ML
100 INJECTION, SOLUTION INTRAMUSCULAR; INTRAVENOUS ONCE
Status: DISCONTINUED | OUTPATIENT
Start: 2025-03-29 | End: 2025-03-29

## 2025-03-29 RX ORDER — 0.9 % SODIUM CHLORIDE 0.9 %
500 INTRAVENOUS SOLUTION INTRAVENOUS ONCE
Status: COMPLETED | OUTPATIENT
Start: 2025-03-29 | End: 2025-03-29

## 2025-03-29 RX ADMIN — INSULIN HUMAN 10 UNITS: 100 INJECTION, SOLUTION PARENTERAL at 01:29

## 2025-03-29 RX ADMIN — DEXTROSE MONOHYDRATE 125 ML: 100 INJECTION, SOLUTION INTRAVENOUS at 04:59

## 2025-03-29 RX ADMIN — DEXTROSE MONOHYDRATE 250 ML: 100 INJECTION, SOLUTION INTRAVENOUS at 07:58

## 2025-03-29 RX ADMIN — SODIUM CHLORIDE 500 ML: 0.9 INJECTION, SOLUTION INTRAVENOUS at 00:32

## 2025-03-29 RX ADMIN — FENTANYL CITRATE 50 MCG: 50 INJECTION INTRAMUSCULAR; INTRAVENOUS at 01:07

## 2025-03-29 ASSESSMENT — PAIN DESCRIPTION - LOCATION
LOCATION: ARM;KNEE;ANKLE
LOCATION: ARM;KNEE;ANKLE

## 2025-03-29 ASSESSMENT — PAIN DESCRIPTION - FREQUENCY: FREQUENCY: CONTINUOUS

## 2025-03-29 ASSESSMENT — PAIN DESCRIPTION - ORIENTATION
ORIENTATION: LEFT
ORIENTATION: LEFT

## 2025-03-29 ASSESSMENT — PAIN DESCRIPTION - ONSET: ONSET: ON-GOING

## 2025-03-29 ASSESSMENT — PAIN DESCRIPTION - PAIN TYPE: TYPE: ACUTE PAIN;CHRONIC PAIN

## 2025-03-29 ASSESSMENT — PAIN - FUNCTIONAL ASSESSMENT
PAIN_FUNCTIONAL_ASSESSMENT: 0-10
PAIN_FUNCTIONAL_ASSESSMENT: NONE - DENIES PAIN
PAIN_FUNCTIONAL_ASSESSMENT: PREVENTS OR INTERFERES WITH MANY ACTIVE NOT PASSIVE ACTIVITIES

## 2025-03-29 ASSESSMENT — PAIN DESCRIPTION - DESCRIPTORS
DESCRIPTORS: ACHING;DISCOMFORT
DESCRIPTORS: ACHING;DISCOMFORT

## 2025-03-29 ASSESSMENT — PAIN SCALES - GENERAL
PAINLEVEL_OUTOF10: 9
PAINLEVEL_OUTOF10: 10

## 2025-03-29 NOTE — PROGRESS NOTES
Physician Progress Note      PATIENT:               LAURA PRICE  [facility] #:                  [id number]  :                       1985  ADMIT DATE:       3/13/2025 8:02 PM  DISCH DATE:        3/25/2025 9:38 AM  RESPONDING  PROVIDER #:        Rachael Salcido MD          QUERY TEXT:    Pt admitted with low hemoglobin and has anemia documented. If possible, please   document in progress notes and discharge summary further specificity   regarding the acuity and type of anemia:    The medical record reflects the following:  Risk Factors: Occult GI bleed, Hx of recurrent GI bleed, Hx of Iron deficiency   anemia  Clinical Indicators: ED 3/13 Pt presented with low hemoglobin and fatigue   stated she was just discharged from the hospital for low hemoglobin and   surgery, chronically low hemoglobin, noted to have Acute on chronic anemia.  H&P 3/14 Acute on chronic severe Symptomatic Anemia, receiving 2 units packed   RBCs.  CN by General surgery 3/19 Normocytic anemia due to blood loss mentioned in   active problem list.  CN by Endocrinology 3/19 Acute on chronic severe anemia, had multiple EGDs,   negative colonoscopy, Neg Meckel's scan.  CN by cardiology 3/20 Acute on chronic anemia, Colonoscopy 3/18/25 mild   diverticulosis.  Scattered diverticula with external and internal hemorrhoids   EGD 3/5/25 mild distal esophagitis.  PN by Hematology oncology 3/14 anemia, likely multifactorial ESRD, possible GI   blood loss, iron deficiency.  PN by Nephrology Anemia in CKD-Low Hemoglobin exacerbated by possible GI Loss   and Fe++ Def, Received 3units PRBC since admission.  PN by IM 3/24 Acute on chronic severe Symptomatic Anemia, no evidence of   intravascular hemolysis Occult GI bleed, S/p colonoscopy on 3/18/2025,   multiple EGDs.  DS 3/25 Acute on chronic severe Symptomatic Anemia, S/P 4 units packed   RBCs.Hematology consult appreciated discussed case no evidence of   intravascular hemolysis Occult GI bleed.  HGB  5.8 3/13,6.4,5.3,4.3,5.6 3/14,6.3,6.4,8.4 3/15, 8.0 3/16,7.8 3/17,8.4   3/18,7.6 3/19,7.9 3/20,7.7 3/22.  HCT 17.2 3/13, 16.4,12.4,16.0 3/14,18.5,24.6 3/15,23.7 3/16,22.9 3/17,25.3   3/18,23.4 3/19,24.6 3/20,24.7 3/21,24.1 3/22    Treatment: Transfusion of 5 units PRBC'S, IV technetium labeled red blood   cells, Iron supplements, Serial labs.    Mabel Reed.SUNDAR Aguero.CDS.  Options provided:  -- Anemia due to acute blood loss  -- Anemia due to chronic blood loss  -- Anemia due to acute on chronic blood loss  -- Anemia due to iron deficiency  -- Anemia due to antineoplastic chemotherapy  -- Anemia due to postoperative blood loss  -- Anemia due to CKD  -- Other - I will add my own diagnosis  -- Disagree - Not applicable / Not valid  -- Disagree - Clinically unable to determine / Unknown  -- Refer to Clinical Documentation Reviewer    PROVIDER RESPONSE TEXT:    This patient has acute on chronic blood loss anemia.    Query created by: Mabel Aguero on 3/29/2025 5:06 AM      Electronically signed by:  Rachael Salcido MD 3/29/2025 8:06 AM

## 2025-03-29 NOTE — ED NOTES
Report called to Maty at [facility]; PAS ETA 1000, transportation paperwork is in the chart.

## 2025-03-29 NOTE — ED PROVIDER NOTES
[facility] EMERGENCY DEPARTMENT  EMERGENCY DEPARTMENT ENCOUNTER        Pt Name: Michelle Nettles  MRN: [id number]  Birthdate 1985  Date of evaluation: 3/28/2025  Provider: Alli Avitia DO  PCP: Rogers Rodríguez MD  Note Started: 12:35 AM EDT 3/29/25    CHIEF COMPLAINT       Chief Complaint   Patient presents with    Hyperglycemia     Per EMS and pt, pt just went to [facility] for rehab from a broken ankle and they have not been checking her blood glucose and have been giving her Lantus during the day instead of at night; pt reports the facility didn't tell her what her FSBG was, just that it was \"high\" - the only symptom the pt reports is dry mouth; pt reports pain from recent procedure to upper left arm, left knee and ankle.       HISTORY OF PRESENT ILLNESS: 1 or more Elements   History From: patient    Limitations to history : None    Michelle Nettles is a 39 y.o. female who presents for high blood sugar from nursing home.  Patient reports she was just discharged from nursing home and had a full dialysis treatment today.  She gets Monday, Wednesday, Friday.  She reports that they did not administer her insulin this evening and they checked her blood sugar and it was high and sent to the emergency department for evaluation.  Does not make urine.  Patient denies fever, chills, headache, shortness of breath, chest pain, abdominal pain, nausea, vomiting, diarrhea, hematochezia, and melena.  Has a history of a left fractured ankle, complaining of some pain to the ankle.  Normally on 4 L NC O2.  Nursing home documentation showed that she was 87% on 5 L, in the ED she is 100% SpO2 on 5 L    Nursing Notes were all reviewed and agreed with or any disagreements were addressed in the HPI.        REVIEW OF SYSTEMS :           Positives and Pertinent negatives as per HPI.     SURGICAL HISTORY     Past Surgical History:   Procedure Laterality Date    ANKLE SURGERY Left  3/7/2025    REMOVAL OF EXTERNAL FIXATOR FROM LEFT LOWER EXTREMITY, OPEN REDUCTION WITH INTERNAL FIXATION LEFT ANKLE TRIMALLEOLAR FRACTURE performed [place] Peyman Germain [place] [facility] [place]     SECTION      COLONOSCOPY N/A 2024    COLONOSCOPY DIAGNOSTIC performed by Balaji Gonzalez MD [place] [facility] [place]    COLONOSCOPY N/A 3/18/2025    COLONOSCOPY DIAGNOSTIC performed by Madhu Cordova MD at [facility] ENDOSCOPY    CYST INCISION AND DRAINAGE  2011    perirectal abscess. [facility]. Dr. Fleming    DIALYSIS FISTULA CREATION Left 2019    LEFT ARM FISTULAGRAM, BALLOON ANGIOPLASTY performed by Haylee Zaidi MD at [facility] OR    DILATION AND CURETTAGE OF UTERUS      FRACTURE SURGERY      fracture right ulnar, left tibia, and pelvis    INVASIVE VASCULAR N/A 2023    Fistulogram left performed by [place]lee [place]idi [place] [facility] [place] [place] [facility]    INVASIVE VASCULAR Left 2023    Angioplasty fistula/dialysis circuit performed by Haylee Zaidi MD [place] [facility] [facility]    LEG SURGERY Left 2/15/2025    Application of left ankle spanning external fixator performed by Peyman Germain MD at [facility] [place]    OTHER SURGICAL HISTORY      open reduction internal fixation left radial shaft    OTHER SURGICAL HISTORY  2016    pericardial window    OTHER SURGICAL HISTORY  2016     r tesio insertion  / remove 2018    OTHER SURGICAL HISTORY Left 2017    insertion a-v fistula left arm    TX ARTERIOVENOUS ANASTOMOSIS OPEN DIRECT Left 2018    REVISION AV FISTULA - LEFT ARM performed by Haylee Zaidi MD at [facility] [place]    TX INSJ NON-TUNNELED CENTRAL VENOUS CATH AGE 5 YR/> N/A 5/10/2018    TESIO CATHETER INSERTION performed by Cornelius Morris MD at [facility] OR    TX VITRECTOMY PARS PLANA REMOVE PRERETINAL MEMBRANE Left 2018    PARS PLANA VITRECTOMY 25 GAUGE, VITREOUS HEMORRHAGE REMOVAL, ENDO LASER, AIR/FLUID  EXCHANGE LEFT EYE

## 2025-03-31 ENCOUNTER — TELEPHONE (OUTPATIENT)
Dept: VASCULAR SURGERY | Age: 40
End: 2025-03-31

## 2025-03-31 ENCOUNTER — HOSPITAL ENCOUNTER (OUTPATIENT)
Dept: GENERAL RADIOLOGY | Age: 40
Discharge: HOME | End: 2025-04-02
Payer: MEDICARE

## 2025-03-31 ENCOUNTER — OFFICE VISIT (OUTPATIENT)
Dept: ORTHOPEDIC SURGERY | Age: 40
End: 2025-03-31
Payer: MEDICARE

## 2025-03-31 ENCOUNTER — CLINICAL DOCUMENTATION (OUTPATIENT)
Dept: VASCULAR SURGERY | Age: 40
End: 2025-03-31

## 2025-03-31 VITALS
RESPIRATION RATE: 22 BRPM | HEIGHT: 59 IN | WEIGHT: 149.91 LBS | SYSTOLIC BLOOD PRESSURE: 137 MMHG | BODY MASS INDEX: 30.22 KG/M2 | OXYGEN SATURATION: 100 % | HEART RATE: 110 BPM | DIASTOLIC BLOOD PRESSURE: 73 MMHG | TEMPERATURE: 98.2 F

## 2025-03-31 DIAGNOSIS — S82.852A TRIMALLEOLAR FRACTURE OF ANKLE, CLOSED, LEFT, INITIAL ENCOUNTER: Primary | ICD-10-CM

## 2025-03-31 DIAGNOSIS — S82.852A TRIMALLEOLAR FRACTURE OF ANKLE, CLOSED, LEFT, INITIAL ENCOUNTER: ICD-10-CM

## 2025-03-31 LAB
EKG ATRIAL RATE: 111 BPM
EKG P AXIS: 54 DEGREES
EKG P-R INTERVAL: 120 MS
EKG Q-T INTERVAL: 328 MS
EKG QRS DURATION: 74 MS
EKG QTC CALCULATION (BAZETT): 446 MS
EKG R AXIS: 7 DEGREES
EKG T AXIS: 95 DEGREES
EKG VENTRICULAR RATE: 111 BPM

## 2025-03-31 PROCEDURE — 73610 X-RAY EXAM OF ANKLE: CPT

## 2025-03-31 PROCEDURE — 99213 OFFICE O/P EST LOW 20 MIN: CPT | Performed by: PHYSICIAN ASSISTANT

## 2025-03-31 PROCEDURE — L4350 ANKLE CONTROL ORTHO PRE OTS: HCPCS | Performed by: PHYSICIAN ASSISTANT

## 2025-03-31 PROCEDURE — 93010 ELECTROCARDIOGRAM REPORT: CPT | Performed by: INTERNAL MEDICINE

## 2025-03-31 PROCEDURE — 99024 POSTOP FOLLOW-UP VISIT: CPT | Performed by: PHYSICIAN ASSISTANT

## 2025-03-31 RX ORDER — HYDROCODONE BITARTRATE AND ACETAMINOPHEN 5; 325 MG/1; MG/1
1 TABLET ORAL EVERY 6 HOURS PRN
COMMUNITY

## 2025-03-31 NOTE — PROGRESS NOTES
Dr Miller called in re this patient     Concerns of recurrent L subclavian, brachiocephalic stenosis    He has been plastying this area and using as big as a 12 balloon.  The stenosis resolves but within 4 weeks she has to come back for another plasty and it recurs.      We did discuss benefit of DCB in this situation.  He has asked that we be involved in her care.      I will arrange for her to be seen in office.      I last saw her in 12/2023.      Haylee Zaidi MD

## 2025-03-31 NOTE — PATIENT INSTRUCTIONS
INSTRUCTIONS FOR FACILITY      Weight Bearing: Non-weight bearing left lower extremity. Use assistive devices when needed.  Transition to boot today.  Can remove boot for hygiene purposes and skin checks and during physical therapy.    Therapy: Continue PT/OT.  Recommend continued physical therapy at facility before discharge, but once discharged, highly recommend home health physical therapy.    Pain control: Per facility physician. Frequent ice, elevation, compression if able.    Incisional Care: sutures removed today in office. Steri strips placed. Steri strips can be removed in 7-10 days if they do not fall off sooner. Continue to monitor for signs or symptoms of infection until skin has completely healed. Recommend thin layer of Vaseline 1-2x daily over incision line to aid in healing. Okay to shower. No scrubbing near incision until skin has completely healed. Thoroughly pat dry with clean towel.     DVT Prophylaxis: Strongly recommend chemical DVT prophylaxis per facility physician.  Can take aspirin 81 mg twice daily unless otherwise contraindicated, then would recommend Lovenox daily.    Follow up in 4 weeks    Call with any questions or concerns.   [phone]

## 2025-03-31 NOTE — PROGRESS NOTES
Chief Complaint   Patient presents with    Follow-up     3wk Po LLE Ex-fix Removal w/ L ankle ORIF DOS 03/07/25 TTS (*GP ends 6-5-2025). Patient states pain lvl 8         OP:SURGEON: Dr. Peyman Germain MD  DATE OF PROCEDURE: 3/7/25  PROCEDURE:  1.  Removal of external fixator left lower extremity     2.  Open reduction internal fixation left trimalleolar ankle fracture without fixation of the posterior malleolus     3.  Stress examination of the syndesmosis under anesthesia, left ankle     4.  Open reduction internal fixation left ankle syndesmosis    Subjective:  Michelle Nettles is approximately 3 weeks follow-up from the above surgery.  She is nonweightbearing to the left lower extremity.  Not taking any DVT prophylaxis.  Living at skilled nursing facility, but not doing much PT. Has maintained splint.  Denies calf pain, CP, SOB, fever, chills, myalgias.    Review of Systems -  all pertinent positives and negatives in HPI.      Objective:    General: Alert and oriented X 3, normocephalic atraumatic, external ears and eye normal, sclera clear, no acute distress, respirations easy and unlabored with no audible wheezes, skin warm and dry, speech and dress appropriate for noted age, affect euthymic.    Extremity:  Left Lower Extremity  Skin clean dry and intact, without signs of infection  Incisions well approximated without signs of redness, warmth or drainage- sutures intact  Mild edema noted throughout foot and ankle   Compartments supple throughout thigh and leg  Calf supple and nontender  Demonstrates active knee flexion/extension, ankle plantar/dorsiflexion/great toe extension.   States sensation intact to touch in sural/deep peroneal/superficial peroneal/saphenous/posterior tibial nerve distributions to foot/ankle.   Palpable dorsalis pedis and posterior tibialis pulses, cap refill brisk in toes, foot warm/perfused.             Vitals:    03/31/25 0948   BP: 137/73   BP Site: Right Upper Arm

## 2025-04-10 ENCOUNTER — HOSPITAL ENCOUNTER (EMERGENCY)
Age: 40
Discharge: NURSING, REHAB OR LONG-TERM CARE | End: 2025-04-11
Attending: EMERGENCY MEDICINE
Payer: MEDICARE

## 2025-04-10 ENCOUNTER — APPOINTMENT (OUTPATIENT)
Dept: GENERAL RADIOLOGY | Age: 40
End: 2025-04-10
Payer: MEDICARE

## 2025-04-10 DIAGNOSIS — Z99.2 ESRD ON DIALYSIS (HCC): Primary | ICD-10-CM

## 2025-04-10 DIAGNOSIS — D63.1 ANEMIA DUE TO CHRONIC KIDNEY DISEASE, UNSPECIFIED CKD STAGE: ICD-10-CM

## 2025-04-10 DIAGNOSIS — N18.9 ANEMIA DUE TO CHRONIC KIDNEY DISEASE, UNSPECIFIED CKD STAGE: ICD-10-CM

## 2025-04-10 DIAGNOSIS — N18.6 ESRD ON DIALYSIS (HCC): Primary | ICD-10-CM

## 2025-04-10 LAB
ANION GAP SERPL CALCULATED.3IONS-SCNC: 12 MMOL/L (ref 7–16)
BASOPHILS # BLD: 0.01 K/UL (ref 0–0.2)
BASOPHILS NFR BLD: 0 % (ref 0–2)
BUN SERPL-MCNC: 44 MG/DL (ref 6–20)
CALCIUM SERPL-MCNC: 9.2 MG/DL (ref 8.6–10.2)
CHLORIDE SERPL-SCNC: 93 MMOL/L (ref 98–107)
CO2 SERPL-SCNC: 31 MMOL/L (ref 22–29)
CREAT SERPL-MCNC: 6.9 MG/DL (ref 0.5–1)
EOSINOPHIL # BLD: 0.33 K/UL (ref 0.05–0.5)
EOSINOPHILS RELATIVE PERCENT: 7 % (ref 0–6)
ERYTHROCYTE [DISTWIDTH] IN BLOOD BY AUTOMATED COUNT: 16.7 % (ref 11.5–15)
GFR, ESTIMATED: 7 ML/MIN/1.73M2
GLUCOSE BLD-MCNC: 102 MG/DL (ref 74–99)
GLUCOSE SERPL-MCNC: 91 MG/DL (ref 74–99)
HCT VFR BLD AUTO: 16.6 % (ref 34–48)
HGB BLD-MCNC: 5.7 G/DL (ref 11.5–15.5)
IMM GRANULOCYTES # BLD AUTO: <0.03 K/UL (ref 0–0.58)
IMM GRANULOCYTES NFR BLD: 0 % (ref 0–5)
LYMPHOCYTES NFR BLD: 1.01 K/UL (ref 1.5–4)
LYMPHOCYTES RELATIVE PERCENT: 22 % (ref 20–42)
MCH RBC QN AUTO: 30.6 PG (ref 26–35)
MCHC RBC AUTO-ENTMCNC: 34.3 G/DL (ref 32–34.5)
MCV RBC AUTO: 89.2 FL (ref 80–99.9)
MONOCYTES NFR BLD: 0.46 K/UL (ref 0.1–0.95)
MONOCYTES NFR BLD: 10 % (ref 2–12)
NEUTROPHILS NFR BLD: 60 % (ref 43–80)
NEUTS SEG NFR BLD: 2.72 K/UL (ref 1.8–7.3)
PLATELET CONFIRMATION: NORMAL
PLATELET, FLUORESCENCE: 247 K/UL (ref 130–450)
PMV BLD AUTO: 10 FL (ref 7–12)
POTASSIUM SERPL-SCNC: 4.2 MMOL/L (ref 3.5–5)
RBC # BLD AUTO: 1.86 M/UL (ref 3.5–5.5)
RBC # BLD: ABNORMAL 10*6/UL
RBC # BLD: ABNORMAL 10*6/UL
SODIUM SERPL-SCNC: 136 MMOL/L (ref 132–146)
TROPONIN I SERPL HS-MCNC: 144 NG/L (ref 0–9)
TROPONIN I SERPL HS-MCNC: 148 NG/L (ref 0–9)
WBC OTHER # BLD: 4.6 K/UL (ref 4.5–11.5)

## 2025-04-10 PROCEDURE — 36430 TRANSFUSION BLD/BLD COMPNT: CPT

## 2025-04-10 PROCEDURE — 85025 COMPLETE CBC W/AUTO DIFF WBC: CPT

## 2025-04-10 PROCEDURE — 86900 BLOOD TYPING SEROLOGIC ABO: CPT

## 2025-04-10 PROCEDURE — 82962 GLUCOSE BLOOD TEST: CPT

## 2025-04-10 PROCEDURE — 99285 EMERGENCY DEPT VISIT HI MDM: CPT

## 2025-04-10 PROCEDURE — 71045 X-RAY EXAM CHEST 1 VIEW: CPT

## 2025-04-10 PROCEDURE — 86850 RBC ANTIBODY SCREEN: CPT

## 2025-04-10 PROCEDURE — 80048 BASIC METABOLIC PNL TOTAL CA: CPT

## 2025-04-10 PROCEDURE — 86923 COMPATIBILITY TEST ELECTRIC: CPT

## 2025-04-10 PROCEDURE — 6360000002 HC RX W HCPCS: Performed by: EMERGENCY MEDICINE

## 2025-04-10 PROCEDURE — 96374 THER/PROPH/DIAG INJ IV PUSH: CPT

## 2025-04-10 PROCEDURE — 6360000002 HC RX W HCPCS

## 2025-04-10 PROCEDURE — 96375 TX/PRO/DX INJ NEW DRUG ADDON: CPT

## 2025-04-10 PROCEDURE — P9016 RBC LEUKOCYTES REDUCED: HCPCS

## 2025-04-10 PROCEDURE — 86901 BLOOD TYPING SEROLOGIC RH(D): CPT

## 2025-04-10 PROCEDURE — 93005 ELECTROCARDIOGRAM TRACING: CPT | Performed by: EMERGENCY MEDICINE

## 2025-04-10 PROCEDURE — 84484 ASSAY OF TROPONIN QUANT: CPT

## 2025-04-10 RX ORDER — ONDANSETRON 2 MG/ML
4 INJECTION INTRAMUSCULAR; INTRAVENOUS ONCE
Status: COMPLETED | OUTPATIENT
Start: 2025-04-10 | End: 2025-04-10

## 2025-04-10 RX ORDER — MORPHINE SULFATE 4 MG/ML
4 INJECTION, SOLUTION INTRAMUSCULAR; INTRAVENOUS ONCE
Refills: 0 | Status: COMPLETED | OUTPATIENT
Start: 2025-04-10 | End: 2025-04-10

## 2025-04-10 RX ORDER — SODIUM CHLORIDE 9 MG/ML
INJECTION, SOLUTION INTRAVENOUS PRN
Status: DISCONTINUED | OUTPATIENT
Start: 2025-04-10 | End: 2025-04-11 | Stop reason: HOSPADM

## 2025-04-10 RX ORDER — ONDANSETRON 2 MG/ML
INJECTION INTRAMUSCULAR; INTRAVENOUS
Status: COMPLETED
Start: 2025-04-10 | End: 2025-04-10

## 2025-04-10 RX ADMIN — MORPHINE SULFATE 4 MG: 4 INJECTION, SOLUTION INTRAMUSCULAR; INTRAVENOUS at 21:45

## 2025-04-10 RX ADMIN — ONDANSETRON 4 MG: 2 INJECTION INTRAMUSCULAR; INTRAVENOUS at 22:19

## 2025-04-10 RX ADMIN — ONDANSETRON 4 MG: 2 INJECTION, SOLUTION INTRAMUSCULAR; INTRAVENOUS at 22:19

## 2025-04-11 VITALS
SYSTOLIC BLOOD PRESSURE: 153 MMHG | HEART RATE: 108 BPM | TEMPERATURE: 98.2 F | RESPIRATION RATE: 15 BRPM | OXYGEN SATURATION: 100 % | DIASTOLIC BLOOD PRESSURE: 79 MMHG

## 2025-04-11 LAB
EKG ATRIAL RATE: 110 BPM
EKG P AXIS: 54 DEGREES
EKG P-R INTERVAL: 130 MS
EKG Q-T INTERVAL: 340 MS
EKG QRS DURATION: 78 MS
EKG QTC CALCULATION (BAZETT): 460 MS
EKG R AXIS: 3 DEGREES
EKG T AXIS: 135 DEGREES
EKG VENTRICULAR RATE: 110 BPM
HCT VFR BLD AUTO: 23.3 % (ref 34–48)
HGB BLD-MCNC: 7.6 G/DL (ref 11.5–15.5)

## 2025-04-11 PROCEDURE — 93010 ELECTROCARDIOGRAM REPORT: CPT | Performed by: INTERNAL MEDICINE

## 2025-04-11 PROCEDURE — 85014 HEMATOCRIT: CPT

## 2025-04-11 PROCEDURE — 85018 HEMOGLOBIN: CPT

## 2025-04-11 PROCEDURE — 36430 TRANSFUSION BLD/BLD COMPNT: CPT

## 2025-04-11 PROCEDURE — P9016 RBC LEUKOCYTES REDUCED: HCPCS

## 2025-04-11 NOTE — ED PROVIDER NOTES
[facility] EMERGENCY DEPARTMENT  EMERGENCY DEPARTMENT ENCOUNTER        Pt Name: Michelle Nettles  MRN: [id number]  Birthdate 1985  Date of evaluation: 4/10/2025  Provider: Nathan Bales DO  PCP: Rogers Rodríguez MD  Note Started: 4:30 AM EDT 4/11/25    CHIEF COMPLAINT       Chief Complaint   Patient presents with    Abnormal Lab     Low hgb 6       HISTORY OF PRESENT ILLNESS: 1 or more Elements   History From: Patient    Limitations to history : None    Michelle Nettles is a 39 y.o. female who presents to the emergency department for abnormal lab.  The patient states that she was sent in from her facility as she had outpatient labs today that showed her hemoglobin was low.  She denies any active bleeding.  No rectal bleeding.  No hematemesis.  No melena.  Patient states she has had transfusions in the past.  Patient is on dialysis.  Patient does state that she has had some intermittent right-sided chest pain over the last several days.  Denies any current shortness of breath    Nursing Notes were all reviewed and agreed with or any disagreements were addressed in the HPI.      REVIEW OF EXTERNAL NOTE :       PDMP Monitoring:    Last PDMP [place] as Reviewed:  Review User Review Instant Review Result          Last Controlled Substance Monitoring Documentation      Flowsheet Row ED to Hosp-Admission (Discharged) from 3/22/2024 in [address] PICU   Acute Pain Prescriptions Severe pain not adequately treated with lower dose. filed at 03/24/2024 1102          Urine Drug Screenings (1 yr)       Urine Drug Screen  Collected: 9/1/2016  3:52 PM (Final result)              Serum Drug Screen  Collected: 4/1/2018  5:14 PM (Final result)              Serum Drug Screen  Collected: 9/1/2016  3:52 PM (Final result)              Serum Drug Screen  Collected: 10/11/2012  5:03 PM (Final result)              Serum Drug Screen  Collected: 7/6/2011 10:50 PM (Final result)          Decision Making  Amount and/or Complexity of Data Reviewed  Labs: ordered.  Radiology: ordered.  ECG/medicine tests: ordered.    Risk  Prescription drug management.        This is a 39-year-old female presented to the ED for an abnormal lab.  Upon arrival to the ED patient was tachycardic and hypertensive otherwise stable vital signs.  On exam patient in no acute distress.  Abdomen soft and nontender.  Due to the patient's anemia patient underwent labs and imaging which showed normal CBC except for hemoglobin of 5.7 and hematocrit of 16.6.  Chemistry showed a chloride 93 BUN of 44 with a creatinine of 6.9.  Patient's bicarb was 31.  Troponins were 148 and 144.  EKG showed no acute changes when compared to previous.  Patient was typed and crossed and transfused 2 units of PRBCs.  Patient was given Zofran and morphine for supportive treatment.  After transfusion patient's repeat hemoglobin is 7.6.  Patient stable for discharge back to facility.  Return precautions given    CONSULTS: (Who and What was discussed)  None        I am the Primary Clinician of Record.    FINAL IMPRESSION      1. ESRD on dialysis (HCC)    2. Anemia due to chronic kidney disease, unspecified CKD stage          DISPOSITION/PLAN     DISPOSITION Decision To Discharge 04/11/2025 05:32:01 AM      PATIENT REFERRED TO:  Rogers Rodríguez MD  [address] [place], [place] [id number] [phone]    Schedule an appointment as soon as possible for a visit in 2 days        DISCHARGE MEDICATIONS:  New Prescriptions    No medications on file       DISCONTINUED MEDICATIONS:  Discontinued Medications    No medications on file              (Please note that portions of this note were completed with a voice recognition program.  Efforts were made to edit the dictations but occasionally words are mis-transcribed.)    Nathan Bales DO (electronically signed)            Nathan Bales DO  04/11/25 0433       Ntahan Bales DO  04/11/25 0530

## 2025-04-11 NOTE — ED NOTES
The following labs were labeled with appropriate pt sticker and tubed to lab:     [] Blue     [x] Lavender   [] on ice  [x] Green/yellow  [] Green/black [] on ice  [] Grey  [] on ice  [] Yellow  [] Red  [x] Pink  [] Type/ Screen  [] ABG  [] VBG    [] COVID-19 swab    [] Rapid  [] PCR  [] Flu swab  [] Peds Viral Panel     [] Urine Sample  [] Fecal Sample  [] Pelvic Cultures  [] Blood Cultures  [] X 2  [] STREP Cultures  [] Wound Cultures

## 2025-04-11 NOTE — DISCHARGE INSTR - COC
Continuity of Care Form    Patient Name: Michelle Nettles   :  1985  MRN:  [id number]    Admit date:  4/10/2025  Discharge date:  ***    Code Status Order: Prior   Advance Directives:     Admitting Physician:  No admitting provider for patient encounter.  PCP: Rogers Rodríguez MD    Discharging Nurse: ***  Discharging Hospital Unit/Room#:   Discharging Unit Phone Number: ***    Emergency Contact:   Extended Emergency Contact Information  Primary Emergency Contact: Deana Musa  Home Phone: [phone]  Mobile Phone: [phone]  Relation: Brother/Sister  Preferred language: English   needed? No  Secondary Emergency Contact: [facility]  Home Phone: [phone]  Relation: Other    Past Surgical History:  Past Surgical History:   Procedure Laterality Date    ANKLE SURGERY Left 3/7/2025    REMOVAL OF EXTERNAL FIXATOR FROM LEFT LOWER EXTREMITY, OPEN REDUCTION WITH INTERNAL FIXATION LEFT ANKLE TRIMALLEOLAR FRACTURE [place] [facility] [place]     SECTION  2013    COLONOSCOPY N/A 2024    COLONOSCOPY DIAGNOSTIC performed by Balaji Gonzalez MD [place] [facility]    COLONOSCOPY N/A 3/18/2025    COLONOSCOPY DIAGNOSTIC performed by Madhu Cordova MD at [facility] ENDOSCOPY    CYST INCISION AND DRAINAGE  2011    perirectal abscess. [facility]. Dr. Fleming    DIALYSIS FISTULA CREATION Left 2019    LEFT ARM FISTULAGRAM, BALLOON ANGIOPLASTY performed by Haylee Zaidi MD at [facility] OR    DILATION AND CURETTAGE OF UTERUS      FRACTURE SURGERY      fracture right ulnar, left tibia, and pelvis    INVASIVE VASCULAR N/A 2023    Fistulogram left performed [place] [facility]    INVASIVE VASCULAR Left 2023    Angioplasty fistula/dialysis circuit performed [place] [facility] [place] [facility]    LEG SURGERY Left 2/15/2025    Application of left ankle  Treatments After Discharge: ***    Physician Certification: I certify the above information and transfer of Michelle Nettles  is necessary for the continuing treatment of the diagnosis listed and that she requires {Admit to Appropriate Level of Care:44945} for {GREATER/LESS:435692306} 30 days.     Update Admission H&P: {CHP DME Changes in HandP:445483894}    PHYSICIAN SIGNATURE:  {Esignature:[id number]}

## 2025-04-12 LAB
ABO/RH: NORMAL
ANTIBODY SCREEN: NEGATIVE
ARM BAND NUMBER: NORMAL
BLOOD BANK BLOOD PRODUCT EXPIRATION DATE: NORMAL
BLOOD BANK BLOOD PRODUCT EXPIRATION DATE: NORMAL
BLOOD BANK DISPENSE STATUS: NORMAL
BLOOD BANK DISPENSE STATUS: NORMAL
BLOOD BANK ISBT PRODUCT BLOOD TYPE: 5100
BLOOD BANK ISBT PRODUCT BLOOD TYPE: 5100
BLOOD BANK PRODUCT CODE: NORMAL
BLOOD BANK PRODUCT CODE: NORMAL
BLOOD BANK SAMPLE EXPIRATION: NORMAL
BLOOD BANK UNIT TYPE AND RH: NORMAL
BLOOD BANK UNIT TYPE AND RH: NORMAL
BPU ID: NORMAL
BPU ID: NORMAL
COMPONENT: NORMAL
COMPONENT: NORMAL
CROSSMATCH RESULT: NORMAL
CROSSMATCH RESULT: NORMAL
TRANSFUSION STATUS: NORMAL
TRANSFUSION STATUS: NORMAL
UNIT DIVISION: 0
UNIT DIVISION: 0
UNIT ISSUE DATE/TIME: NORMAL
UNIT ISSUE DATE/TIME: NORMAL

## 2025-04-16 DIAGNOSIS — E10.69 TYPE 1 DIABETES MELLITUS WITH OTHER SPECIFIED COMPLICATION: ICD-10-CM

## 2025-04-16 RX ORDER — INSULIN PMP CART,AUT,G6/7,CNTR
EACH SUBCUTANEOUS
Qty: 30 EACH | Refills: 3 | Status: SHIPPED | OUTPATIENT
Start: 2025-04-16

## 2025-04-17 ENCOUNTER — PATIENT MESSAGE (OUTPATIENT)
Dept: PRIMARY CARE CLINIC | Age: 40
End: 2025-04-17

## 2025-04-17 DIAGNOSIS — N18.6 TYPE 1 DIABETES MELLITUS WITH CHRONIC KIDNEY DISEASE ON CHRONIC DIALYSIS (HCC): ICD-10-CM

## 2025-04-17 DIAGNOSIS — Z99.81 CHRONIC HYPOXIC RESPIRATORY FAILURE, ON HOME OXYGEN THERAPY (HCC): ICD-10-CM

## 2025-04-17 DIAGNOSIS — I50.22 CHRONIC SYSTOLIC (CONGESTIVE) HEART FAILURE (HCC): Primary | ICD-10-CM

## 2025-04-17 DIAGNOSIS — J96.11 CHRONIC HYPOXIC RESPIRATORY FAILURE, ON HOME OXYGEN THERAPY (HCC): ICD-10-CM

## 2025-04-17 DIAGNOSIS — E10.22 TYPE 1 DIABETES MELLITUS WITH CHRONIC KIDNEY DISEASE ON CHRONIC DIALYSIS (HCC): ICD-10-CM

## 2025-04-17 DIAGNOSIS — Z99.2 TYPE 1 DIABETES MELLITUS WITH CHRONIC KIDNEY DISEASE ON CHRONIC DIALYSIS (HCC): ICD-10-CM

## 2025-04-17 RX ORDER — INSULIN LISPRO 100 [IU]/ML
3 INJECTION, SOLUTION INTRAVENOUS; SUBCUTANEOUS
Qty: 15 ML | Refills: 1 | Status: SHIPPED | OUTPATIENT
Start: 2025-04-17

## 2025-04-25 RX ORDER — ADHESIVE BANDAGE 3/4"
BANDAGE TOPICAL
Qty: 1 EACH | Refills: 0 | Status: SHIPPED | OUTPATIENT
Start: 2025-04-25

## 2025-04-28 ENCOUNTER — APPOINTMENT (OUTPATIENT)
Dept: GENERAL RADIOLOGY | Age: 40
DRG: 291 | End: 2025-04-28
Payer: MEDICARE

## 2025-04-28 ENCOUNTER — HOSPITAL ENCOUNTER (INPATIENT)
Age: 40
LOS: 6 days | Discharge: HOME | DRG: 291 | End: 2025-05-04
Attending: EMERGENCY MEDICINE | Admitting: INTERNAL MEDICINE
Payer: MEDICARE

## 2025-04-28 DIAGNOSIS — N18.9 ACUTE RENAL FAILURE SUPERIMPOSED ON CHRONIC KIDNEY DISEASE, UNSPECIFIED ACUTE RENAL FAILURE TYPE, UNSPECIFIED CKD STAGE: ICD-10-CM

## 2025-04-28 DIAGNOSIS — E87.5 HYPERKALEMIA: ICD-10-CM

## 2025-04-28 DIAGNOSIS — N17.9 ACUTE RENAL FAILURE SUPERIMPOSED ON CHRONIC KIDNEY DISEASE, UNSPECIFIED ACUTE RENAL FAILURE TYPE, UNSPECIFIED CKD STAGE: ICD-10-CM

## 2025-04-28 DIAGNOSIS — D63.8 ANEMIA OF CHRONIC DISEASE: Primary | ICD-10-CM

## 2025-04-28 DIAGNOSIS — S82.852A TRIMALLEOLAR FRACTURE OF ANKLE, CLOSED, LEFT, INITIAL ENCOUNTER: ICD-10-CM

## 2025-04-28 LAB
ALBUMIN SERPL-MCNC: 3.7 G/DL (ref 3.5–5.2)
ALP SERPL-CCNC: 115 U/L (ref 35–104)
ALT SERPL-CCNC: 16 U/L (ref 0–32)
ANION GAP SERPL CALCULATED.3IONS-SCNC: 15 MMOL/L (ref 7–16)
AST SERPL-CCNC: 20 U/L (ref 0–31)
BILIRUB SERPL-MCNC: 0.3 MG/DL (ref 0–1.2)
BUN SERPL-MCNC: 66 MG/DL (ref 6–20)
CALCIUM SERPL-MCNC: 8.6 MG/DL (ref 8.6–10.2)
CHLORIDE SERPL-SCNC: 95 MMOL/L (ref 98–107)
CO2 SERPL-SCNC: 25 MMOL/L (ref 22–29)
CREAT SERPL-MCNC: 10.2 MG/DL (ref 0.5–1)
ERYTHROCYTE [DISTWIDTH] IN BLOOD BY AUTOMATED COUNT: 15.6 % (ref 11.5–15)
GFR, ESTIMATED: 5 ML/MIN/1.73M2
GLUCOSE BLD-MCNC: 221 MG/DL (ref 74–99)
GLUCOSE BLD-MCNC: 376 MG/DL (ref 74–99)
GLUCOSE SERPL-MCNC: 187 MG/DL (ref 74–99)
HCT VFR BLD AUTO: 17.2 % (ref 34–48)
HCT VFR BLD AUTO: 17.7 % (ref 34–48)
HGB BLD-MCNC: 5.3 G/DL (ref 11.5–15.5)
HGB BLD-MCNC: 5.7 G/DL (ref 11.5–15.5)
MCH RBC QN AUTO: 29.3 PG (ref 26–35)
MCHC RBC AUTO-ENTMCNC: 30.8 G/DL (ref 32–34.5)
MCV RBC AUTO: 95 FL (ref 80–99.9)
PLATELET # BLD AUTO: 203 K/UL (ref 130–450)
PMV BLD AUTO: 9.4 FL (ref 7–12)
POTASSIUM SERPL-SCNC: 5.3 MMOL/L (ref 3.5–5)
PROT SERPL-MCNC: 7.2 G/DL (ref 6.4–8.3)
RBC # BLD AUTO: 1.81 M/UL (ref 3.5–5.5)
SODIUM SERPL-SCNC: 135 MMOL/L (ref 132–146)
WBC OTHER # BLD: 5.8 K/UL (ref 4.5–11.5)

## 2025-04-28 PROCEDURE — 2500000003 HC RX 250 WO HCPCS: Performed by: EMERGENCY MEDICINE

## 2025-04-28 PROCEDURE — 85018 HEMOGLOBIN: CPT

## 2025-04-28 PROCEDURE — 86850 RBC ANTIBODY SCREEN: CPT

## 2025-04-28 PROCEDURE — 6360000002 HC RX W HCPCS: Performed by: INTERNAL MEDICINE

## 2025-04-28 PROCEDURE — 73610 X-RAY EXAM OF ANKLE: CPT

## 2025-04-28 PROCEDURE — 30233N1 TRANSFUSION OF NONAUTOLOGOUS RED BLOOD CELLS INTO PERIPHERAL VEIN, PERCUTANEOUS APPROACH: ICD-10-PCS | Performed by: STUDENT IN AN ORGANIZED HEALTH CARE EDUCATION/TRAINING PROGRAM

## 2025-04-28 PROCEDURE — 86923 COMPATIBILITY TEST ELECTRIC: CPT

## 2025-04-28 PROCEDURE — 82962 GLUCOSE BLOOD TEST: CPT

## 2025-04-28 PROCEDURE — 96374 THER/PROPH/DIAG INJ IV PUSH: CPT

## 2025-04-28 PROCEDURE — 2060000000 HC ICU INTERMEDIATE R&B

## 2025-04-28 PROCEDURE — 99223 1ST HOSP IP/OBS HIGH 75: CPT | Performed by: INTERNAL MEDICINE

## 2025-04-28 PROCEDURE — 86900 BLOOD TYPING SEROLOGIC ABO: CPT

## 2025-04-28 PROCEDURE — 6370000000 HC RX 637 (ALT 250 FOR IP): Performed by: EMERGENCY MEDICINE

## 2025-04-28 PROCEDURE — 6370000000 HC RX 637 (ALT 250 FOR IP): Performed by: INTERNAL MEDICINE

## 2025-04-28 PROCEDURE — 85014 HEMATOCRIT: CPT

## 2025-04-28 PROCEDURE — 6360000002 HC RX W HCPCS: Performed by: EMERGENCY MEDICINE

## 2025-04-28 PROCEDURE — 86901 BLOOD TYPING SEROLOGIC RH(D): CPT

## 2025-04-28 PROCEDURE — P9016 RBC LEUKOCYTES REDUCED: HCPCS

## 2025-04-28 PROCEDURE — 80053 COMPREHEN METABOLIC PANEL: CPT

## 2025-04-28 PROCEDURE — 85027 COMPLETE CBC AUTOMATED: CPT

## 2025-04-28 PROCEDURE — 93005 ELECTROCARDIOGRAM TRACING: CPT | Performed by: EMERGENCY MEDICINE

## 2025-04-28 PROCEDURE — 99285 EMERGENCY DEPT VISIT HI MDM: CPT

## 2025-04-28 RX ORDER — INSULIN GLARGINE 100 [IU]/ML
4 INJECTION, SOLUTION SUBCUTANEOUS EVERY MORNING
Status: DISCONTINUED | OUTPATIENT
Start: 2025-04-29 | End: 2025-04-29

## 2025-04-28 RX ORDER — MULTIVITAMIN WITH IRON
1 TABLET ORAL DAILY
Status: DISCONTINUED | OUTPATIENT
Start: 2025-04-28 | End: 2025-05-04 | Stop reason: HOSPADM

## 2025-04-28 RX ORDER — INSULIN LISPRO 100 [IU]/ML
0-4 INJECTION, SOLUTION INTRAVENOUS; SUBCUTANEOUS
Status: DISCONTINUED | OUTPATIENT
Start: 2025-04-28 | End: 2025-05-04 | Stop reason: HOSPADM

## 2025-04-28 RX ORDER — HYDROCODONE BITARTRATE AND ACETAMINOPHEN 5; 325 MG/1; MG/1
2 TABLET ORAL EVERY 6 HOURS PRN
Refills: 0 | Status: DISCONTINUED | OUTPATIENT
Start: 2025-04-28 | End: 2025-05-04 | Stop reason: HOSPADM

## 2025-04-28 RX ORDER — GABAPENTIN 100 MG/1
100 CAPSULE ORAL 2 TIMES DAILY
COMMUNITY

## 2025-04-28 RX ORDER — GABAPENTIN 100 MG/1
100 CAPSULE ORAL 2 TIMES DAILY
Status: DISCONTINUED | OUTPATIENT
Start: 2025-04-28 | End: 2025-05-04 | Stop reason: HOSPADM

## 2025-04-28 RX ORDER — FOLIC ACID/VIT B COMPLEX AND C 0.8 MG
1 TABLET ORAL DAILY
COMMUNITY

## 2025-04-28 RX ORDER — INDOMETHACIN 25 MG/1
50 CAPSULE ORAL ONCE
Status: COMPLETED | OUTPATIENT
Start: 2025-04-28 | End: 2025-04-28

## 2025-04-28 RX ORDER — SODIUM CHLORIDE 9 MG/ML
INJECTION, SOLUTION INTRAVENOUS PRN
Status: DISCONTINUED | OUTPATIENT
Start: 2025-04-28 | End: 2025-05-04 | Stop reason: HOSPADM

## 2025-04-28 RX ORDER — AMLODIPINE BESYLATE 10 MG/1
10 TABLET ORAL DAILY
Status: DISCONTINUED | OUTPATIENT
Start: 2025-04-28 | End: 2025-05-04 | Stop reason: HOSPADM

## 2025-04-28 RX ORDER — DEXTROSE MONOHYDRATE 100 MG/ML
INJECTION, SOLUTION INTRAVENOUS CONTINUOUS PRN
Status: DISCONTINUED | OUTPATIENT
Start: 2025-04-28 | End: 2025-05-04 | Stop reason: HOSPADM

## 2025-04-28 RX ORDER — ALBUTEROL SULFATE 0.83 MG/ML
5 SOLUTION RESPIRATORY (INHALATION) 4 TIMES DAILY PRN
Status: DISCONTINUED | OUTPATIENT
Start: 2025-04-28 | End: 2025-05-04 | Stop reason: HOSPADM

## 2025-04-28 RX ORDER — NICOTINE 21 MG/24HR
1 PATCH, TRANSDERMAL 24 HOURS TRANSDERMAL EVERY 24 HOURS
COMMUNITY

## 2025-04-28 RX ORDER — CARVEDILOL 25 MG/1
25 TABLET ORAL
Status: DISCONTINUED | OUTPATIENT
Start: 2025-04-28 | End: 2025-05-04 | Stop reason: HOSPADM

## 2025-04-28 RX ORDER — ASPIRIN 81 MG/1
81 TABLET, CHEWABLE ORAL DAILY
COMMUNITY

## 2025-04-28 RX ORDER — NICOTINE 21 MG/24HR
1 PATCH, TRANSDERMAL 24 HOURS TRANSDERMAL EVERY 24 HOURS
Status: DISCONTINUED | OUTPATIENT
Start: 2025-04-28 | End: 2025-05-04 | Stop reason: HOSPADM

## 2025-04-28 RX ORDER — OXYCODONE AND ACETAMINOPHEN 5; 325 MG/1; MG/1
2 TABLET ORAL ONCE
Refills: 0 | Status: COMPLETED | OUTPATIENT
Start: 2025-04-28 | End: 2025-04-28

## 2025-04-28 RX ORDER — GLUCAGON 1 MG/ML
1 KIT INJECTION PRN
Status: DISCONTINUED | OUTPATIENT
Start: 2025-04-28 | End: 2025-05-04 | Stop reason: HOSPADM

## 2025-04-28 RX ORDER — OMEPRAZOLE 40 MG/1
40 CAPSULE, DELAYED RELEASE ORAL DAILY
COMMUNITY

## 2025-04-28 RX ORDER — ONDANSETRON 4 MG/1
4 TABLET, FILM COATED ORAL EVERY 8 HOURS PRN
COMMUNITY

## 2025-04-28 RX ORDER — FERROUS SULFATE 325(65) MG
325 TABLET ORAL EVERY OTHER DAY
Status: DISCONTINUED | OUTPATIENT
Start: 2025-04-29 | End: 2025-05-04 | Stop reason: HOSPADM

## 2025-04-28 RX ORDER — PANTOPRAZOLE SODIUM 40 MG/1
40 TABLET, DELAYED RELEASE ORAL
Status: DISCONTINUED | OUTPATIENT
Start: 2025-04-29 | End: 2025-05-04 | Stop reason: HOSPADM

## 2025-04-28 RX ORDER — MORPHINE SULFATE 2 MG/ML
2 INJECTION, SOLUTION INTRAMUSCULAR; INTRAVENOUS EVERY 4 HOURS PRN
Refills: 0 | Status: DISCONTINUED | OUTPATIENT
Start: 2025-04-28 | End: 2025-05-04 | Stop reason: HOSPADM

## 2025-04-28 RX ORDER — HYDROCODONE BITARTRATE AND ACETAMINOPHEN 5; 325 MG/1; MG/1
1 TABLET ORAL ONCE
Status: COMPLETED | OUTPATIENT
Start: 2025-04-28 | End: 2025-04-28

## 2025-04-28 RX ORDER — ASPIRIN 81 MG/1
81 TABLET, CHEWABLE ORAL DAILY
Status: DISCONTINUED | OUTPATIENT
Start: 2025-04-28 | End: 2025-05-04 | Stop reason: HOSPADM

## 2025-04-28 RX ORDER — CALCIUM GLUCONATE 20 MG/ML
1000 INJECTION, SOLUTION INTRAVENOUS ONCE
Status: COMPLETED | OUTPATIENT
Start: 2025-04-28 | End: 2025-04-28

## 2025-04-28 RX ORDER — ACETAMINOPHEN 325 MG/1
650 TABLET ORAL ONCE
Status: COMPLETED | OUTPATIENT
Start: 2025-04-28 | End: 2025-04-28

## 2025-04-28 RX ORDER — DIPHENHYDRAMINE HCL 25 MG
25 TABLET ORAL EVERY 6 HOURS PRN
Status: DISCONTINUED | OUTPATIENT
Start: 2025-04-28 | End: 2025-05-04 | Stop reason: HOSPADM

## 2025-04-28 RX ADMIN — ACETAMINOPHEN 650 MG: 325 TABLET ORAL at 14:40

## 2025-04-28 RX ADMIN — MORPHINE SULFATE 2 MG: 2 INJECTION, SOLUTION INTRAMUSCULAR; INTRAVENOUS at 22:44

## 2025-04-28 RX ADMIN — PETROLATUM: 420 OINTMENT TOPICAL at 18:04

## 2025-04-28 RX ADMIN — CARVEDILOL 25 MG: 25 TABLET, FILM COATED ORAL at 20:36

## 2025-04-28 RX ADMIN — AMLODIPINE BESYLATE 10 MG: 10 TABLET ORAL at 20:36

## 2025-04-28 RX ADMIN — CALCIUM GLUCONATE 1000 MG: 20 INJECTION, SOLUTION INTRAVENOUS at 16:14

## 2025-04-28 RX ADMIN — INSULIN LISPRO 4 UNITS: 100 INJECTION, SOLUTION INTRAVENOUS; SUBCUTANEOUS at 20:37

## 2025-04-28 RX ADMIN — SODIUM BICARBONATE 50 MEQ: 84 INJECTION, SOLUTION INTRAVENOUS at 16:37

## 2025-04-28 RX ADMIN — HYDROCODONE BITARTRATE AND ACETAMINOPHEN 1 TABLET: 5; 325 TABLET ORAL at 14:22

## 2025-04-28 RX ADMIN — MULTIVITAMIN TABLET 1 TABLET: TABLET at 20:36

## 2025-04-28 RX ADMIN — ASPIRIN 81 MG: 81 TABLET, CHEWABLE ORAL at 20:36

## 2025-04-28 RX ADMIN — OXYCODONE AND ACETAMINOPHEN 2 TABLET: 325; 5 TABLET ORAL at 15:50

## 2025-04-28 RX ADMIN — SODIUM ZIRCONIUM CYCLOSILICATE 10 G: 10 POWDER, FOR SUSPENSION ORAL at 16:19

## 2025-04-28 RX ADMIN — GABAPENTIN 100 MG: 100 CAPSULE ORAL at 20:36

## 2025-04-28 ASSESSMENT — PAIN SCALES - GENERAL
PAINLEVEL_OUTOF10: 3
PAINLEVEL_OUTOF10: 10
PAINLEVEL_OUTOF10: 8
PAINLEVEL_OUTOF10: 8
PAINLEVEL_OUTOF10: 10
PAINLEVEL_OUTOF10: 10

## 2025-04-28 ASSESSMENT — PAIN DESCRIPTION - ORIENTATION
ORIENTATION: LEFT
ORIENTATION: LEFT;LOWER

## 2025-04-28 ASSESSMENT — PAIN DESCRIPTION - LOCATION
LOCATION: ANKLE;FOOT
LOCATION: FOOT;ANKLE
LOCATION: ANKLE
LOCATION: ANKLE;FOOT
LOCATION: ANKLE;FOOT

## 2025-04-28 ASSESSMENT — PAIN - FUNCTIONAL ASSESSMENT
PAIN_FUNCTIONAL_ASSESSMENT: 0-10
PAIN_FUNCTIONAL_ASSESSMENT: PREVENTS OR INTERFERES SOME ACTIVE ACTIVITIES AND ADLS

## 2025-04-28 ASSESSMENT — PAIN DESCRIPTION - DESCRIPTORS
DESCRIPTORS: ACHING
DESCRIPTORS: BURNING;SHARP

## 2025-04-28 NOTE — H&P
[facility] Hospitalist Group History and Physical    Perpetual assessment: 39-year-old female with past medical history of end-stage renal disease on dialysis, diabetes, hypertension and ankle fracture presents with low hemoglobin    Assessment and plan:    Anemia of chronic disease  - Has known history of this  - Hemoglobin down to 5.3  - No signs of active bleeding  - Will continue transfusion started in the ED    Type I IDDM with end-stage renal disease on HD  - Blood glucose stable  - Receives daily dialysis while at the rehab center  - Will consult nephrology for continued dialysis  - Continue patient's Lantus and sliding scale    Essential hypertension  - Blood pressure stable  - Continue patient's Coreg    Left ankle fracture  - Continues to have pain despite being operated back in February  - Will continue pain control  - Consult orthopedics for repeat evaluation      Code Status: Full  DVT prophylaxis: Heparin      CHIEF COMPLAINT: Low hemoglobin    History of Present Illness: This is a 39-year-old female patient.  She is currently residing in a rehab facility after she sustained a ankle fracture back in February.  She continues to have pain associated with this.  Today she had outpatient labs done that showed worsening of her chronic anemia.  Subsequently she was sent to the ED for transfusion.  Nephrology was also consulted.  Due to her daily dialysis it was felt that she should come in for blood transfusion repeat dialysis in the morning.  Due to continued pain in left ankle despite having suffered February we will have her see her orthopedic surgeon as she was to see him this week in the outpatient setting.    Informant(s) for H&P:    REVIEW OF SYSTEMS:  A comprehensive review of systems was negative except for: what is in the HPI      PMH:  Past Medical History:   Diagnosis Date    JOLLY (acute kidney injury) 04/05/2016    Anemia     AVF (arteriovenous fistula) 02/19/2018    let arm    Cellulitis,

## 2025-04-28 NOTE — ED NOTES
ED to Inpatient Handoff Report    Notified Floyd on floor that electronic handoff available and patient ready for transport to room 627.    Safety Risks: None identified    Patient in Restraints: no    Constant Observer or Patient : no    Telemetry Monitoring Ordered: Yes          Order to transfer to unit without monitor: NO    Last MEWS: 3 Time completed: 1658      Deterioration Index: 39.59    Vitals:    04/28/25 1343 04/28/25 1608 04/28/25 1625 04/28/25 1656   BP: 132/70 122/70 (!) 147/72 127/67   Pulse: (!) 108 (!) 113 (!) 112 (!) 113   Resp: 18 20 22 20   Temp: 98.1 °F (36.7 °C) 98 °F (36.7 °C) 98.3 °F (36.8 °C) 98.2 °F (36.8 °C)   TempSrc: Oral   Oral   SpO2: 99% 93% 94% 95%   Weight: 68 kg (150 lb)      Height: 1.499 m (4' 11\")          Opportunity for questions and clarification was provided.

## 2025-04-28 NOTE — PROGRESS NOTES
Database initiated. Patient is A&O comes in from [facility]. States she is NWB to LLE d/t fracture and has been using a wheelchair and wearing a soft cast/boot. She wears 5 liters oxygen at baseline. She is also NO LEFT ARM d/t fistula.

## 2025-04-28 NOTE — PROGRESS NOTES
4 Eyes Skin Assessment     NAME:  Michelle Nettles  YOB: 1985  MEDICAL RECORD NUMBER:  [id number]    The patient is being assessed for  Admission    I agree that at least one RN has performed a thorough Head to Toe Skin Assessment on the patient. ALL assessment sites listed below have been assessed.      Areas assessed by both nurses:    Head, Face, Ears, Shoulders, Back, Chest, Arms, Elbows, Hands, Sacrum. Buttock, Coccyx, Ischium, Legs. Feet and Heels, and Under Medical Devices         Does the Patient have a Wound? No noted wound(s)       Edgar Prevention initiated by RN: Yes  Wound Care Orders initiated by RN: No    Pressure Injury (Stage 3,4, Unstageable, DTI, NWPT, and Complex wounds) if present, place Wound referral order by RN under : No    New Ostomies, if present place, Ostomy referral order under : No     Nurse 1 eSignature: Electronically signed by Haider Reeves RN on 4/28/25 at 6:45 PM EDT    **SHARE this note so that the co-signing nurse can place an eSignature**    Nurse 2 eSignature: Electronically signed by Isabelle Ordonez RN on 4/28/25 at 6:46 PM EDT

## 2025-04-28 NOTE — ED PROVIDER NOTES
HPI:  4/28/25,   Time: 1:56 PM EDT         Michelle ORDONEZ Lencho Nettles is a 39 y.o. female presenting to the ED for evaluation of a low hemoglobin.  Patient is chronically oxygen dependent.  She was due for dialysis today.  She had routine lab work performed which showed a hemoglobin of 6.8.  For this reason she was sent to the emergency department for possibility of a blood transfusion prior to dialysis.  She states she has had a normal appetite.  She was feeling little bit weaker this morning with a checked her blood glucose and it was in the 40s.  She has eaten and is since corrected.  She states she feels fine now.  Has no specific acute complaints.  She has pain in her left foot from a fracture dislocation a couple of months ago.      ROS:   Pertinent positives and negatives are stated within HPI, all other systems reviewed and are negative.  --------------------------------------------- PAST HISTORY ---------------------------------------------  Past Medical History:  has a past medical history of JOLLY (acute kidney injury), Anemia, AVF (arteriovenous fistula), Cellulitis, face, CHF (congestive heart failure) ([facility]), Chronic kidney disease, Chronic respiratory failure with hypoxia ([facility]), Dialysis AV fistula malfunction, initial encounter, Displaced trimalleolar fracture of left lower leg, closed, initial encounter, DM type 1 (diabetes mellitus, type 1) ([facility]), Encounter regarding vascular access for dialysis for ESRD ([facility]), ESRD (end stage renal disease) ([facility]), Hemodialysis patient, Hidradenitis suppurativa, Hypercalcemia, Hyperkalemia, Hypertension, Iron deficiency anemia secondary to blood loss (chronic), Other acute gastritis without hemorrhage, Tobacco abuse, Type 2 diabetes mellitus with hyperglycemia ([facility]), and Vitamin B-complex deficiency.    Past Surgical History:  has a past surgical history that includes Dilation and curettage of uterus (2009); Breast cyst incision and drainage (8/4/2011); other surgical

## 2025-04-28 NOTE — CARE COORDINATION
Social Work/Transition of Care:    Patient is from [facility], SW contacted liamichelle Elias for Med list and orders.      Electronically signed by MARCELO verma on 4/28/2025 at 3:07 PM

## 2025-04-28 NOTE — CONSENT
Informed Consent for Blood Component Transfusion Note    I have discussed with the patient the rationale for blood component transfusion; its benefits in treating or preventing fatigue, organ damage, or death; and its risk which includes mild transfusion reactions, rare risk of blood borne infection, or more serious but rare reactions. I have discussed the alternatives to transfusion, including the risk and consequences of not receiving transfusion. The patient had an opportunity to ask questions and had agreed to proceed with transfusion of blood components.    Electronically signed by Elias Reynoso DO on 4/28/25 at 3:33 PM EDT

## 2025-04-28 NOTE — DISCHARGE INSTRUCTIONS
Orthopedic discharge instructions:  - Progressive weightbearing to left lower extremity -in cam boot: Partial weightbearing 25% for 1 week may progress to 50% the following week, 75% the following week, to full weightbearing as tolerated.  - Chemical DVT prophylaxis per facility, recommend 81 mg aspirin twice daily  - Pain control over-the-counter per facility as needed   - Call for follow-up appointment: Patient to follow-up in 4 weeks for reevaluation and repeat x-rays of her left ankle

## 2025-04-28 NOTE — ED NOTES
Patient stating she feels like her blood sugar is low.  at this time. Pt states it was 47 when checked at [facility] prior to arrival and she drank mountain dew.

## 2025-04-29 PROBLEM — D63.8 ANEMIA OF CHRONIC DISEASE: Status: ACTIVE | Noted: 2025-03-16

## 2025-04-29 PROBLEM — I50.32 CHRONIC HEART FAILURE WITH PRESERVED EJECTION FRACTION (HFPEF) (HCC): Status: ACTIVE | Noted: 2025-04-29

## 2025-04-29 PROBLEM — I38 VHD (VALVULAR HEART DISEASE): Status: ACTIVE | Noted: 2025-04-29

## 2025-04-29 PROBLEM — R07.89 ATYPICAL CHEST PAIN: Status: ACTIVE | Noted: 2025-03-20

## 2025-04-29 LAB
ANION GAP SERPL CALCULATED.3IONS-SCNC: 21 MMOL/L (ref 7–16)
BUN SERPL-MCNC: 76 MG/DL (ref 6–20)
CALCIUM SERPL-MCNC: 8.5 MG/DL (ref 8.6–10.2)
CHLORIDE SERPL-SCNC: 91 MMOL/L (ref 98–107)
CO2 SERPL-SCNC: 21 MMOL/L (ref 22–29)
CREAT SERPL-MCNC: 11.1 MG/DL (ref 0.5–1)
EKG ATRIAL RATE: 100 BPM
EKG ATRIAL RATE: 111 BPM
EKG P AXIS: 22 DEGREES
EKG P AXIS: 53 DEGREES
EKG P-R INTERVAL: 130 MS
EKG P-R INTERVAL: 150 MS
EKG Q-T INTERVAL: 324 MS
EKG Q-T INTERVAL: 342 MS
EKG QRS DURATION: 62 MS
EKG QRS DURATION: 66 MS
EKG QTC CALCULATION (BAZETT): 440 MS
EKG QTC CALCULATION (BAZETT): 441 MS
EKG R AXIS: 8 DEGREES
EKG R AXIS: 9 DEGREES
EKG T AXIS: 76 DEGREES
EKG T AXIS: 95 DEGREES
EKG VENTRICULAR RATE: 100 BPM
EKG VENTRICULAR RATE: 111 BPM
GFR, ESTIMATED: 4 ML/MIN/1.73M2
GLUCOSE BLD-MCNC: 163 MG/DL (ref 74–99)
GLUCOSE BLD-MCNC: 292 MG/DL (ref 74–99)
GLUCOSE BLD-MCNC: 295 MG/DL (ref 74–99)
GLUCOSE BLD-MCNC: 434 MG/DL (ref 74–99)
GLUCOSE SERPL-MCNC: 427 MG/DL (ref 74–99)
HBA1C MFR BLD: 6.5 % (ref 4–5.6)
HCT VFR BLD AUTO: 23.4 % (ref 34–48)
HGB BLD-MCNC: 7.6 G/DL (ref 11.5–15.5)
POTASSIUM SERPL-SCNC: 6.3 MMOL/L (ref 3.5–5)
SODIUM SERPL-SCNC: 133 MMOL/L (ref 132–146)
TROPONIN I SERPL HS-MCNC: 125 NG/L (ref 0–14)

## 2025-04-29 PROCEDURE — 93005 ELECTROCARDIOGRAM TRACING: CPT

## 2025-04-29 PROCEDURE — P9016 RBC LEUKOCYTES REDUCED: HCPCS

## 2025-04-29 PROCEDURE — 83036 HEMOGLOBIN GLYCOSYLATED A1C: CPT

## 2025-04-29 PROCEDURE — 82962 GLUCOSE BLOOD TEST: CPT

## 2025-04-29 PROCEDURE — 6370000000 HC RX 637 (ALT 250 FOR IP)

## 2025-04-29 PROCEDURE — 85018 HEMOGLOBIN: CPT

## 2025-04-29 PROCEDURE — 6370000000 HC RX 637 (ALT 250 FOR IP): Performed by: INTERNAL MEDICINE

## 2025-04-29 PROCEDURE — 36430 TRANSFUSION BLD/BLD COMPNT: CPT

## 2025-04-29 PROCEDURE — 6360000002 HC RX W HCPCS: Performed by: INTERNAL MEDICINE

## 2025-04-29 PROCEDURE — 80048 BASIC METABOLIC PNL TOTAL CA: CPT

## 2025-04-29 PROCEDURE — 84484 ASSAY OF TROPONIN QUANT: CPT

## 2025-04-29 PROCEDURE — 99223 1ST HOSP IP/OBS HIGH 75: CPT | Performed by: INTERNAL MEDICINE

## 2025-04-29 PROCEDURE — 36415 COLL VENOUS BLD VENIPUNCTURE: CPT

## 2025-04-29 PROCEDURE — 93010 ELECTROCARDIOGRAM REPORT: CPT | Performed by: INTERNAL MEDICINE

## 2025-04-29 PROCEDURE — APPSS60 APP SPLIT SHARED TIME 46-60 MINUTES

## 2025-04-29 PROCEDURE — 85014 HEMATOCRIT: CPT

## 2025-04-29 PROCEDURE — 5A1D70Z PERFORMANCE OF URINARY FILTRATION, INTERMITTENT, LESS THAN 6 HOURS PER DAY: ICD-10-PCS | Performed by: INTERNAL MEDICINE

## 2025-04-29 PROCEDURE — 99232 SBSQ HOSP IP/OBS MODERATE 35: CPT | Performed by: STUDENT IN AN ORGANIZED HEALTH CARE EDUCATION/TRAINING PROGRAM

## 2025-04-29 PROCEDURE — 90935 HEMODIALYSIS ONE EVALUATION: CPT

## 2025-04-29 PROCEDURE — 99222 1ST HOSP IP/OBS MODERATE 55: CPT | Performed by: INTERNAL MEDICINE

## 2025-04-29 PROCEDURE — 2700000000 HC OXYGEN THERAPY PER DAY

## 2025-04-29 PROCEDURE — 2060000000 HC ICU INTERMEDIATE R&B

## 2025-04-29 RX ORDER — INSULIN GLARGINE 100 [IU]/ML
4 INJECTION, SOLUTION SUBCUTANEOUS 2 TIMES DAILY
Status: DISCONTINUED | OUTPATIENT
Start: 2025-04-29 | End: 2025-04-30

## 2025-04-29 RX ORDER — INSULIN LISPRO 100 [IU]/ML
4 INJECTION, SOLUTION INTRAVENOUS; SUBCUTANEOUS ONCE
Status: COMPLETED | OUTPATIENT
Start: 2025-04-29 | End: 2025-04-29

## 2025-04-29 RX ORDER — INSULIN LISPRO 100 [IU]/ML
2 INJECTION, SOLUTION INTRAVENOUS; SUBCUTANEOUS
Status: DISCONTINUED | OUTPATIENT
Start: 2025-04-30 | End: 2025-04-30

## 2025-04-29 RX ADMIN — PANTOPRAZOLE SODIUM 40 MG: 40 TABLET, DELAYED RELEASE ORAL at 06:15

## 2025-04-29 RX ADMIN — HYDROCODONE BITARTRATE AND ACETAMINOPHEN 2 TABLET: 5; 325 TABLET ORAL at 15:04

## 2025-04-29 RX ADMIN — MORPHINE SULFATE 2 MG: 2 INJECTION, SOLUTION INTRAMUSCULAR; INTRAVENOUS at 03:01

## 2025-04-29 RX ADMIN — HYDROCODONE BITARTRATE AND ACETAMINOPHEN 2 TABLET: 5; 325 TABLET ORAL at 22:46

## 2025-04-29 RX ADMIN — ASPIRIN 81 MG: 81 TABLET, CHEWABLE ORAL at 08:39

## 2025-04-29 RX ADMIN — MORPHINE SULFATE 2 MG: 2 INJECTION, SOLUTION INTRAMUSCULAR; INTRAVENOUS at 17:02

## 2025-04-29 RX ADMIN — MULTIVITAMIN TABLET 1 TABLET: TABLET at 08:38

## 2025-04-29 RX ADMIN — GABAPENTIN 100 MG: 100 CAPSULE ORAL at 17:01

## 2025-04-29 RX ADMIN — INSULIN GLARGINE 4 UNITS: 100 INJECTION, SOLUTION SUBCUTANEOUS at 08:39

## 2025-04-29 RX ADMIN — INSULIN LISPRO 2 UNITS: 100 INJECTION, SOLUTION INTRAVENOUS; SUBCUTANEOUS at 21:24

## 2025-04-29 RX ADMIN — DIPHENHYDRAMINE HCL 25 MG: 25 TABLET ORAL at 23:21

## 2025-04-29 RX ADMIN — FERROUS SULFATE TAB 325 MG (65 MG ELEMENTAL FE) 325 MG: 325 (65 FE) TAB at 08:38

## 2025-04-29 RX ADMIN — CARVEDILOL 25 MG: 25 TABLET, FILM COATED ORAL at 17:01

## 2025-04-29 RX ADMIN — INSULIN GLARGINE 4 UNITS: 100 INJECTION, SOLUTION SUBCUTANEOUS at 21:24

## 2025-04-29 RX ADMIN — INSULIN LISPRO 4 UNITS: 100 INJECTION, SOLUTION INTRAVENOUS; SUBCUTANEOUS at 08:40

## 2025-04-29 RX ADMIN — INSULIN LISPRO 2 UNITS: 100 INJECTION, SOLUTION INTRAVENOUS; SUBCUTANEOUS at 17:02

## 2025-04-29 RX ADMIN — GABAPENTIN 100 MG: 100 CAPSULE ORAL at 08:38

## 2025-04-29 RX ADMIN — MORPHINE SULFATE 2 MG: 2 INJECTION, SOLUTION INTRAMUSCULAR; INTRAVENOUS at 21:25

## 2025-04-29 RX ADMIN — INSULIN LISPRO 4 UNITS: 100 INJECTION, SOLUTION INTRAVENOUS; SUBCUTANEOUS at 08:39

## 2025-04-29 ASSESSMENT — PAIN DESCRIPTION - LOCATION
LOCATION: ANKLE
LOCATION: ANKLE
LOCATION: ANKLE;FOOT

## 2025-04-29 ASSESSMENT — PAIN SCALES - GENERAL
PAINLEVEL_OUTOF10: 7
PAINLEVEL_OUTOF10: 5
PAINLEVEL_OUTOF10: 3
PAINLEVEL_OUTOF10: 3
PAINLEVEL_OUTOF10: 9
PAINLEVEL_OUTOF10: 8
PAINLEVEL_OUTOF10: 8
PAINLEVEL_OUTOF10: 9

## 2025-04-29 ASSESSMENT — PAIN DESCRIPTION - DESCRIPTORS
DESCRIPTORS: ACHING;DISCOMFORT;SORE
DESCRIPTORS: THROBBING
DESCRIPTORS: BURNING;ACHING
DESCRIPTORS: BURNING;SORE
DESCRIPTORS: ACHING;DISCOMFORT

## 2025-04-29 ASSESSMENT — PAIN DESCRIPTION - ORIENTATION
ORIENTATION: LEFT

## 2025-04-29 ASSESSMENT — PAIN - FUNCTIONAL ASSESSMENT: PAIN_FUNCTIONAL_ASSESSMENT: PREVENTS OR INTERFERES SOME ACTIVE ACTIVITIES AND ADLS

## 2025-04-29 NOTE — FLOWSHEET NOTE
04/29/25 1250   Vital Signs   /69   Temp 98.5 °F (36.9 °C)   Pulse (!) 103   Respirations 18   Weight - Scale 65.3 kg (143 lb 15.4 oz)   Weight Method Estimated   Percent Weight Change -4.03   Pain Assessment   Pain Assessment None - Denies Pain   Post-Hemodialysis Assessment   Post-Treatment Procedures Blood returned;Access bleeding time < 10 minutes   Machine Disinfection Process Exterior Machine Disinfection   Rinseback Volume (ml) 300 ml   Blood Volume Processed (Liters) 67.3 L   Dialyzer Clearance Lightly streaked   Duration of Treatment (minutes) 210 minutes   Hemodialysis Intake (ml) 300 ml   Hemodialysis Output (ml) 3000 ml   NET Removed (ml) 2700   Tolerated Treatment Good   Patient Response to Treatment tolerated tx well, 2700 net removed, needles pulled, hemostasis achieved, (+)B/T, stable at dc. post report to Jose Flaherty RN   Bilateral Breath Sounds Diminished;Clear   Edema Facial;Right lower extremity;Left lower extremity   Time Off 1245   Patient Disposition Return to room   Observations & Evaluations   Level of Consciousness 0   Oriented X 3   Heart Rhythm Regular   Respiratory Quality/Effort Unlabored   O2 Device Nasal cannula   Skin Color Pink   Skin Condition/Temp Warm;Dry

## 2025-04-29 NOTE — PROGRESS NOTES
[facility] Hospitalist Progress Note    Admitting Date and Time: 4/28/2025  1:24 PM  Admit Dx: Hyperkalemia [E87.5]  Anemia [D64.9]  Anemia of chronic disease [D63.8]  Acute renal failure superimposed on chronic kidney disease, unspecified acute renal failure type, unspecified CKD stage [N17.9, N18.9]    Subjective:  Patient is being followed for Hyperkalemia [E87.5]  Anemia [D64.9]  Anemia of chronic disease [D63.8]  Acute renal failure superimposed on chronic kidney disease, unspecified acute renal failure type, unspecified CKD stage [N17.9, N18.9]   Pt feels dyspnea, chest pain. Denies active bleeding.  Per RN: No major concerns.    ROS: denies fever, chills, cp, sob, n/v, HA unless stated above.      insulin lispro  4 Units SubCUTAneous Once    insulin regular  10 Units SubCUTAneous Once    amLODIPine  10 mg Oral Daily    aspirin  81 mg Oral Daily    carvedilol  25 mg Oral Dinner    ferrous sulfate  325 mg Oral Every Other Day    gabapentin  100 mg Oral BID    insulin glargine  4 Units SubCUTAneous QAM    multivitamin  1 tablet Oral Daily    nicotine  1 patch TransDERmal Q24H    pantoprazole  40 mg Oral QAM AC    insulin lispro  0-4 Units SubCUTAneous 4x Daily AC & HS     sodium chloride, , PRN  white petrolatum, , BID PRN  albuterol, 5 mg, 4x Daily PRN  diphenhydrAMINE, 25 mg, Q6H PRN  HYDROcodone-acetaminophen, 2 tablet, Q6H PRN  magnesium hydroxide, 30 mL, Daily PRN  tiZANidine, 4 mg, Daily PRN  glucose, 4 tablet, PRN  dextrose bolus, 125 mL, PRN   Or  dextrose bolus, 250 mL, PRN  glucagon (rDNA), 1 mg, PRN  dextrose, , Continuous PRN  morphine, 2 mg, Q4H PRN  sodium chloride, , PRN         Objective:    /60   Pulse (!) 102   Temp 98.8 °F (37.1 °C) (Oral)   Resp 21   Ht 1.499 m (4' 11\")   Wt 68 kg (150 lb)   SpO2 100%   BMI 30.30 kg/m²     General Appearance: alert and oriented to person, place and time and in no acute distress, 5L oxygen, obese, Facial puffiness  Skin: warm and

## 2025-04-29 NOTE — PLAN OF CARE
Problem: ABCDS Injury Assessment  Goal: Absence of physical injury  Outcome: Progressing     Problem: Skin/Tissue Integrity  Goal: Skin integrity remains intact  Description: 1.  Monitor for areas of redness and/or skin breakdown2.  Assess vascular access sites hourly3.  Every 4-6 hours minimum:  Change oxygen saturation probe site4.  Every 4-6 hours:  If on nasal continuous positive airway pressure, respiratory therapy assess nares and determine need for appliance change or resting period  Outcome: Progressing     Problem: Discharge Planning  Goal: Discharge to home or other facility with appropriate resources  Outcome: Progressing     Problem: Chronic Conditions and Co-morbidities  Goal: Patient's chronic conditions and co-morbidity symptoms are monitored and maintained or improved  Outcome: Progressing     Problem: Pain  Goal: Verbalizes/displays adequate comfort level or baseline comfort level  Outcome: Progressing     Problem: Safety - Adult  Goal: Free from fall injury  Outcome: Progressing

## 2025-04-29 NOTE — PROGRESS NOTES
Message sent to Rosmery NP regarding patients  and patient's complaint on new onset chest pain. New orders placed by NP.

## 2025-04-29 NOTE — PLAN OF CARE
Problem: ABCDS Injury Assessment  Goal: Absence of physical injury  Outcome: Progressing     Problem: Skin/Tissue Integrity  Goal: Skin integrity remains intact  Description: 1.  Monitor for areas of redness and/or skin breakdown2.  Assess vascular access sites hourly3.  Every 4-6 hours minimum:  Change oxygen saturation probe site4.  Every 4-6 hours:  If on nasal continuous positive airway pressure, respiratory therapy assess nares and determine need for appliance change or resting period  Outcome: Progressing     Problem: Discharge Planning  Goal: Discharge to home or other facility with appropriate resources  Outcome: Progressing

## 2025-04-29 NOTE — PROGRESS NOTES
[facility] Quality Flow/Interdisciplinary Rounds Progress Note        Quality Flow Rounds held on April 29, 2025    Disciplines Attending:  Bedside Nurse, , , and Nursing Unit Leadership    Michelle ORDONEZ Lencho Nettles was admitted on 4/28/2025  1:24 PM    Anticipated Discharge Date:       Disposition:    Edgar Score:  Edgar Scale Score: 18    [facility] RISK OF UNPLANNED READMISSION 2.0             44.7 Total Score        Discussed patient goal for the day, patient clinical progression, and barriers to discharge.  The following Goal(s) of the Day/Commitment(s) have been identified:   discharge planning, nephro, ortho, 2u PRBC, check fecal occult      Javon Verdin RN  April 29, 2025

## 2025-04-29 NOTE — ACP (ADVANCE CARE PLANNING)
Advance Care Planning   Healthcare Decision Maker:    Primary Decision Maker: Deana Musa - Brother/Sister - [phone]    Secondary Decision Maker: Cain Musa - Brother/Sister - [phone]    Supplemental (Other) Decision Maker: Nicole Musa - Other - [phone]    Supplemental (Other) Decision Maker: Barbie Celeste - Brother/Sister - [phone]    Supplemental (Other) Decision Maker: Santosh Pink - Aunt/Uncle - [phone]

## 2025-04-29 NOTE — PROGRESS NOTES
Consult completed to Cardiology via perfect serve text message    Karen Up - unit secretary   [facility]

## 2025-04-29 NOTE — PROGRESS NOTES
Consult received  Charts/labs reviewed  Extensive review of medical record  Full consult to follow        Discussed with Dr. Sadi Apodaca, APRN - CNP 12:16 PM for Dr. Owens

## 2025-04-29 NOTE — CARE COORDINATION
Pt admitted d/t anemia, ESRD -  pt is a bedhold/LTC at [facility], no precert needed to return. Renal, endocrinology and orhto following - left ankle fx s/p OR 2/25. Envelope, transport form and SYLVIE completed.   HUGO LeeN, RN  [facility] Case Management  [phone]

## 2025-04-29 NOTE — PROGRESS NOTES
Spiritual Health History and Assessment/Progress Note  [facility]    Initial Encounter,  ,  ,      Name: Michelle Nettles MRN: [id number]    Age: 39 y.o.     Sex: female   Language: English   Synagogue: Qu[employer]   Anemia     Date: 4/29/2025                           Spiritual Assessment began in SEB 6S INTERMEDIATE        Referral/Consult From: Rounding   Encounter Overview/Reason: Initial Encounter  Service Provided For: Patient    Jillian, Belief, Meaning:   Patient identifies as spiritual, is connected with a jillian tradition or spiritual practice, and has beliefs or practices that help with coping during difficult times  Family/Friends No family/friends present      Importance and Influence:  Patient has no beliefs influential to healthcare decision-making identified during this visit  Family/Friends No family/friends present    Community:  Patient is connected with a spiritual community and feels well-supported. Support system includes: Children, Friends, and Extended family  Family/Friends No family/friends present    Assessment and Plan of Care:     Patient Interventions include: Facilitated expression of thoughts and feelings, Affirmed coping skills/support systems, and Provided sacramental/Mormon ritual  Family/Friends Interventions include: No family/friends present    Patient Plan of Care: Spiritual Care available upon further referral  Family/Friends Plan of Care: No family/friends present    Electronically signed by Chaplain Hakan on 4/29/2025 at 3:41 PM

## 2025-04-29 NOTE — DISCHARGE INSTR - COC
Continuity of Care Form    Patient Name: Michelle Nettles   :  1985  MRN:  [id number]    Admit date:  2025  Discharge date:  2025    Code Status Order: Full Code   Advance Directives:     Admitting Physician:  Sudheer Liao MD  PCP: Rogers Rodríguez MD    Discharging Nurse: Ayanna Banda RN  Discharging Hospital Unit/Room#: 0627/0627-A  Discharging Unit Phone Number: [phone]    Emergency Contact:   Extended Emergency Contact Information  Primary Emergency Contact: Deana Musa  Home Phone: [phone]  Mobile Phone: [phone]  Relation: Brother/Sister  Preferred language: English   needed? No  Secondary Emergency Contact: Paras Musawafiliberto   [facility]  Home Phone: [phone]  Relation: Other    Past Surgical History:  Past Surgical History:   Procedure Laterality Date    ANKLE SURGERY Left 3/7/2025    REMOVAL OF EXTERNAL FIXATOR FROM LEFT LOWER EXTREMITY, OPEN REDUCTION WITH INTERNAL FIXATION LEFT ANKLE TRIMALLEOLAR FRACTURE performed by Peyman Germain MD at [facility] OR     SECTION  2013    COLONOSCOPY N/A 2024    COLONOSCOPY DIAGNOSTIC performed by Balaji Gonzalez MD at [facility] ENDOSCOPY    COLONOSCOPY N/A 3/18/2025    COLONOSCOPY DIAGNOSTIC performed by Madhu Cordova MD at [facility] ENDOSCOPY    CYST INCISION AND DRAINAGE  2011    perirectal abscess. [facility]. Dr. Fleming    DIALYSIS FISTULA CREATION Left 2019    LEFT ARM FISTULAGRAM, BALLOON ANGIOPLASTY performed by Haylee Zaidi MD at [facility] OR    DILATION AND CURETTAGE OF UTERUS      FRACTURE SURGERY      fracture right ulnar, left tibia, and pelvis    INVASIVE VASCULAR N/A 2023    Fistulogram left performed by Haylee Zaidi MD at [facility] CARDIAC CATH LAB    INVASIVE VASCULAR Left 2023    Angioplasty fistula/dialysis circuit performed by Haylee Zaidi MD at [facility] CARDIAC CATH LAB    LEG SURGERY Left 2/15/2025    Application of

## 2025-04-30 LAB
ABO/RH: NORMAL
ANION GAP SERPL CALCULATED.3IONS-SCNC: 14 MMOL/L (ref 7–16)
ANTIBODY SCREEN: NEGATIVE
ARM BAND NUMBER: NORMAL
BLOOD BANK BLOOD PRODUCT EXPIRATION DATE: NORMAL
BLOOD BANK BLOOD PRODUCT EXPIRATION DATE: NORMAL
BLOOD BANK DISPENSE STATUS: NORMAL
BLOOD BANK DISPENSE STATUS: NORMAL
BLOOD BANK ISBT PRODUCT BLOOD TYPE: 5100
BLOOD BANK ISBT PRODUCT BLOOD TYPE: 5100
BLOOD BANK PRODUCT CODE: NORMAL
BLOOD BANK PRODUCT CODE: NORMAL
BLOOD BANK SAMPLE EXPIRATION: NORMAL
BLOOD BANK UNIT TYPE AND RH: NORMAL
BLOOD BANK UNIT TYPE AND RH: NORMAL
BPU ID: NORMAL
BPU ID: NORMAL
BUN SERPL-MCNC: 38 MG/DL (ref 6–20)
CALCIUM SERPL-MCNC: 8.9 MG/DL (ref 8.6–10.2)
CHLORIDE SERPL-SCNC: 98 MMOL/L (ref 98–107)
CO2 SERPL-SCNC: 26 MMOL/L (ref 22–29)
COMPONENT: NORMAL
COMPONENT: NORMAL
CREAT SERPL-MCNC: 6.8 MG/DL (ref 0.5–1)
CROSSMATCH RESULT: NORMAL
CROSSMATCH RESULT: NORMAL
ERYTHROCYTE [DISTWIDTH] IN BLOOD BY AUTOMATED COUNT: 16.8 % (ref 11.5–15)
GFR, ESTIMATED: 7 ML/MIN/1.73M2
GLUCOSE BLD-MCNC: 206 MG/DL (ref 74–99)
GLUCOSE BLD-MCNC: 256 MG/DL (ref 74–99)
GLUCOSE BLD-MCNC: 281 MG/DL (ref 74–99)
GLUCOSE BLD-MCNC: 305 MG/DL (ref 74–99)
GLUCOSE SERPL-MCNC: 192 MG/DL (ref 74–99)
HCT VFR BLD AUTO: 23.7 % (ref 34–48)
HGB BLD-MCNC: 7.5 G/DL (ref 11.5–15.5)
MCH RBC QN AUTO: 29.1 PG (ref 26–35)
MCHC RBC AUTO-ENTMCNC: 31.6 G/DL (ref 32–34.5)
MCV RBC AUTO: 91.9 FL (ref 80–99.9)
PHOSPHATE SERPL-MCNC: 6 MG/DL (ref 2.5–4.5)
PLATELET # BLD AUTO: 216 K/UL (ref 130–450)
PMV BLD AUTO: 9.6 FL (ref 7–12)
POTASSIUM SERPL-SCNC: 4.7 MMOL/L (ref 3.5–5)
PTH-INTACT SERPL-MCNC: 491 PG/ML (ref 15–65)
PTH-INTACT SERPL-MCNC: NORMAL PG/ML (ref 14–72)
RBC # BLD AUTO: 2.58 M/UL (ref 3.5–5.5)
SODIUM SERPL-SCNC: 138 MMOL/L (ref 132–146)
TRANSFUSION STATUS: NORMAL
TRANSFUSION STATUS: NORMAL
UNIT DIVISION: 0
UNIT DIVISION: 0
UNIT ISSUE DATE/TIME: NORMAL
UNIT ISSUE DATE/TIME: NORMAL
WBC OTHER # BLD: 4.8 K/UL (ref 4.5–11.5)

## 2025-04-30 PROCEDURE — 2060000000 HC ICU INTERMEDIATE R&B

## 2025-04-30 PROCEDURE — 99232 SBSQ HOSP IP/OBS MODERATE 35: CPT | Performed by: STUDENT IN AN ORGANIZED HEALTH CARE EDUCATION/TRAINING PROGRAM

## 2025-04-30 PROCEDURE — 6370000000 HC RX 637 (ALT 250 FOR IP): Performed by: INTERNAL MEDICINE

## 2025-04-30 PROCEDURE — 99232 SBSQ HOSP IP/OBS MODERATE 35: CPT | Performed by: INTERNAL MEDICINE

## 2025-04-30 PROCEDURE — 6360000002 HC RX W HCPCS: Performed by: INTERNAL MEDICINE

## 2025-04-30 PROCEDURE — 84100 ASSAY OF PHOSPHORUS: CPT

## 2025-04-30 PROCEDURE — 6370000000 HC RX 637 (ALT 250 FOR IP): Performed by: STUDENT IN AN ORGANIZED HEALTH CARE EDUCATION/TRAINING PROGRAM

## 2025-04-30 PROCEDURE — 82962 GLUCOSE BLOOD TEST: CPT

## 2025-04-30 PROCEDURE — 90945 DIALYSIS ONE EVALUATION: CPT

## 2025-04-30 PROCEDURE — 85027 COMPLETE CBC AUTOMATED: CPT

## 2025-04-30 PROCEDURE — 83970 ASSAY OF PARATHORMONE: CPT

## 2025-04-30 PROCEDURE — 84165 PROTEIN E-PHORESIS SERUM: CPT

## 2025-04-30 PROCEDURE — 36415 COLL VENOUS BLD VENIPUNCTURE: CPT

## 2025-04-30 PROCEDURE — 80048 BASIC METABOLIC PNL TOTAL CA: CPT

## 2025-04-30 PROCEDURE — 84155 ASSAY OF PROTEIN SERUM: CPT

## 2025-04-30 PROCEDURE — 2700000000 HC OXYGEN THERAPY PER DAY

## 2025-04-30 RX ORDER — INSULIN GLARGINE 100 [IU]/ML
5 INJECTION, SOLUTION SUBCUTANEOUS NIGHTLY
Status: DISCONTINUED | OUTPATIENT
Start: 2025-05-01 | End: 2025-05-04 | Stop reason: HOSPADM

## 2025-04-30 RX ORDER — INSULIN GLARGINE 100 [IU]/ML
5 INJECTION, SOLUTION SUBCUTANEOUS 2 TIMES DAILY
Status: DISCONTINUED | OUTPATIENT
Start: 2025-05-01 | End: 2025-04-30

## 2025-04-30 RX ORDER — SEVELAMER CARBONATE 800 MG/1
800 TABLET, FILM COATED ORAL
Status: DISCONTINUED | OUTPATIENT
Start: 2025-04-30 | End: 2025-05-04 | Stop reason: HOSPADM

## 2025-04-30 RX ORDER — INSULIN LISPRO 100 [IU]/ML
3 INJECTION, SOLUTION INTRAVENOUS; SUBCUTANEOUS
Status: DISCONTINUED | OUTPATIENT
Start: 2025-05-01 | End: 2025-05-04 | Stop reason: HOSPADM

## 2025-04-30 RX ORDER — POLYETHYLENE GLYCOL 3350 17 G/17G
17 POWDER, FOR SOLUTION ORAL DAILY
Status: DISCONTINUED | OUTPATIENT
Start: 2025-04-30 | End: 2025-05-04 | Stop reason: HOSPADM

## 2025-04-30 RX ADMIN — INSULIN GLARGINE 4 UNITS: 100 INJECTION, SOLUTION SUBCUTANEOUS at 20:06

## 2025-04-30 RX ADMIN — HYDROCODONE BITARTRATE AND ACETAMINOPHEN 2 TABLET: 5; 325 TABLET ORAL at 22:39

## 2025-04-30 RX ADMIN — CARVEDILOL 25 MG: 25 TABLET, FILM COATED ORAL at 17:39

## 2025-04-30 RX ADMIN — MORPHINE SULFATE 2 MG: 2 INJECTION, SOLUTION INTRAMUSCULAR; INTRAVENOUS at 12:45

## 2025-04-30 RX ADMIN — ASPIRIN 81 MG: 81 TABLET, CHEWABLE ORAL at 12:39

## 2025-04-30 RX ADMIN — INSULIN LISPRO 2 UNITS: 100 INJECTION, SOLUTION INTRAVENOUS; SUBCUTANEOUS at 17:40

## 2025-04-30 RX ADMIN — POLYETHYLENE GLYCOL 3350 17 G: 17 POWDER, FOR SOLUTION ORAL at 19:57

## 2025-04-30 RX ADMIN — MORPHINE SULFATE 2 MG: 2 INJECTION, SOLUTION INTRAMUSCULAR; INTRAVENOUS at 06:39

## 2025-04-30 RX ADMIN — HYDROCODONE BITARTRATE AND ACETAMINOPHEN 2 TABLET: 5; 325 TABLET ORAL at 15:01

## 2025-04-30 RX ADMIN — AMLODIPINE BESYLATE 10 MG: 10 TABLET ORAL at 12:40

## 2025-04-30 RX ADMIN — SEVELAMER CARBONATE 800 MG: 800 TABLET, FILM COATED ORAL at 17:39

## 2025-04-30 RX ADMIN — MORPHINE SULFATE 2 MG: 2 INJECTION, SOLUTION INTRAMUSCULAR; INTRAVENOUS at 19:58

## 2025-04-30 RX ADMIN — MULTIVITAMIN TABLET 1 TABLET: TABLET at 12:40

## 2025-04-30 RX ADMIN — INSULIN LISPRO 3 UNITS: 100 INJECTION, SOLUTION INTRAVENOUS; SUBCUTANEOUS at 12:36

## 2025-04-30 RX ADMIN — GABAPENTIN 100 MG: 100 CAPSULE ORAL at 17:40

## 2025-04-30 RX ADMIN — PANTOPRAZOLE SODIUM 40 MG: 40 TABLET, DELAYED RELEASE ORAL at 06:02

## 2025-04-30 RX ADMIN — INSULIN LISPRO 2 UNITS: 100 INJECTION, SOLUTION INTRAVENOUS; SUBCUTANEOUS at 12:38

## 2025-04-30 RX ADMIN — INSULIN LISPRO 2 UNITS: 100 INJECTION, SOLUTION INTRAVENOUS; SUBCUTANEOUS at 20:07

## 2025-04-30 RX ADMIN — SEVELAMER CARBONATE 800 MG: 800 TABLET, FILM COATED ORAL at 13:47

## 2025-04-30 ASSESSMENT — PAIN SCALES - GENERAL
PAINLEVEL_OUTOF10: 4
PAINLEVEL_OUTOF10: 6
PAINLEVEL_OUTOF10: 7
PAINLEVEL_OUTOF10: 8
PAINLEVEL_OUTOF10: 6
PAINLEVEL_OUTOF10: 4
PAINLEVEL_OUTOF10: 8

## 2025-04-30 ASSESSMENT — PAIN DESCRIPTION - DESCRIPTORS
DESCRIPTORS: ACHING;SORE
DESCRIPTORS: STABBING;DISCOMFORT;THROBBING
DESCRIPTORS: ACHING;SORE
DESCRIPTORS: ACHING;DISCOMFORT;SHARP

## 2025-04-30 ASSESSMENT — PAIN DESCRIPTION - LOCATION
LOCATION: SHOULDER;ANKLE
LOCATION: ANKLE
LOCATION: SHOULDER;ANKLE
LOCATION: SHOULDER;ANKLE
LOCATION: ANKLE;BACK

## 2025-04-30 ASSESSMENT — PAIN DESCRIPTION - ORIENTATION
ORIENTATION: RIGHT
ORIENTATION: LEFT
ORIENTATION: RIGHT;LEFT
ORIENTATION: LEFT;LOWER
ORIENTATION: LEFT

## 2025-04-30 NOTE — PROGRESS NOTES
Nephrology Consult  [facility]  Severo France MD    CC:   esrd    HPI:      4/29:  Michelle JOSÉ LUIS Nettles is a 39 y.o. female with a past medical history of anemia of stage V CKD, recent left trimalleolar fracture, ESRD, poorly controlled IDDM, chronic HFpEF, & hidradenitis suppurativa who presented to the ED from [facility] for fatigue with low Hgb.  She has had recurrent admissions for the same thing.her hemoglobin here on admission was 5.3. she has received 2 units of prbc. Repeat hg is 7.6. she is now being seen on hd. She feels better after the blood transfusion. She has no cp, sob, headache, cough now. No diarrhea, melena, brbpr. She is tolerating hd. She is volume overloaded and her goal is 2.7 L.other labs show na 133, k 5.3>6.3, co2 21, bun 76, cr 11.1, ca 8.5, alb 3.7, wbc 5.8, plt 203. Vitals show temp 98.5, rr 18, hr 103, bp 121/69.  She is  S/p repeat EGD showing gastritis & Schatzki's ring but no source of bleeding during admission in march 2025. C scope was nonrevealing.  She has had internal hemorrhoids back in July 2024.     Interval History:    4/30: pt seen and examined. Sp hd today for 3 L,  tolerated well. Sob improved    PMH:    Past Medical History:   Diagnosis Date    JOLLY (acute kidney injury) 04/05/2016    Anemia     AVF (arteriovenous fistula) 02/19/2018    let arm    Cellulitis, face     CHF (congestive heart failure) ([facility])     Chronic kidney disease     Chronic respiratory failure with hypoxia ([facility])     Dialysis AV fistula malfunction, initial encounter 11/07/2019    Displaced trimalleolar fracture of left lower leg, closed, initial encounter 02/22/2025    DM type 1 (diabetes mellitus, type 1) ([facility])     Encounter regarding vascular access for dialysis for ESRD ([facility]) 07/12/2018    ESRD (end stage renal disease) ([facility])     Hemodialysis patient     amrita dawson mon wed fri / graft in left arm    Hidradenitis suppurativa     Hypercalcemia     Hyperkalemia

## 2025-04-30 NOTE — PROGRESS NOTES
PROGRESS NOTE        Patient Presents with/Seen in Consultation For      Reason for Consult: Acute on chronic anemia   CHIEF COMPLAINT: Low hemoglobin   Subjective:     Patient seen laying in bed no apparent distress. No current complaints. For dialysis this am, will have to hold off on SBFT, will hope for tomorrow, pt agrees.     Review of Systems  Aside from what was mentioned in the PMH and HPI, essentially unremarkable, all others negative.    Objective:     BP (!) 105/59   Pulse 86   Temp 98.7 °F (37.1 °C) (Oral)   Resp 18   Ht 1.499 m (4' 11\")   Wt 62.3 kg (137 lb 5.6 oz)   SpO2 98%   BMI 27.74 kg/m²     General appearance: alert, awake, laying in bed, and cooperative, generalized swelling   Eyes: conjunctiva pale, sclera anicteric. PERRL.  Lungs: clear to auscultation bilaterally  Heart: regular rate and rhythm, no murmur, 2+ pulses; 1+ edema  Abdomen: soft, non-tender; bowel sounds normal; no masses,  no organomegaly  Extremities: extremities 1+without edema  Pulses: 2+ and symmetric  Skin: Skin color, texture, turgor normal.   Neurologic: Grossly normal    sevelamer (RENVELA) tablet 800 mg, TID WC  polyethylene glycol (GLYCOLAX) packet 17 g, Daily  insulin lispro (HUMALOG,ADMELOG) injection vial 3 Units, TID WC  insulin glargine (LANTUS) injection vial 5 Units, Nightly  0.9 % sodium chloride infusion, PRN  white petrolatum ointment, BID PRN  albuterol (PROVENTIL) (2.5 MG/3ML) 0.083% nebulizer solution 5 mg, 4x Daily PRN  amLODIPine (NORVASC) tablet 10 mg, Daily  aspirin chewable tablet 81 mg, Daily  carvedilol (COREG) tablet 25 mg, Dinner  diphenhydrAMINE (BENADRYL) tablet 25 mg, Q6H PRN  ferrous sulfate (IRON 325) tablet 325 mg, Every Other Day  gabapentin (NEURONTIN) capsule 100 mg, BID  HYDROcodone-acetaminophen (NORCO) 5-325 MG per tablet 2 tablet, Q6H PRN  magnesium hydroxide (MILK OF MAGNESIA) 400 MG/5ML suspension 30 mL, Daily PRN  multivitamin 1 tablet, Daily  nicotine (NICODERM CQ) 21

## 2025-04-30 NOTE — PROGRESS NOTES
P Quality Flow/Interdisciplinary Rounds Progress Note        Quality Flow Rounds held on April 30, 2025    Disciplines Attending:  Bedside Nurse, , , and Nursing Unit Leadership    Michelle JOSÉ LUIS Nettles was admitted on 4/28/2025  1:24 PM    Anticipated Discharge Date:  Expected Discharge Date: 05/02/25    Disposition:    Edgar Score:  Edgar Scale Score: 18    [facility] RISK OF UNPLANNED READMISSION 2.0             43.6 Total Score        Discussed patient goal for the day, patient clinical progression, and barriers to discharge.  The following Goal(s) of the Day/Commitment(s) have been identified:   wean oxygen await full plans from consultts       Maty Busch RN  April 30, 2025

## 2025-04-30 NOTE — FLOWSHEET NOTE
04/30/25 1145   Vital Signs   /88   Temp 97.6 °F (36.4 °C)   Pulse 95   Respirations 18   Weight - Scale 62.3 kg (137 lb 5.6 oz)   Weight Method Bed scale   Percent Weight Change -4.59   Pain Assessment   Pain Assessment None - Denies Pain   Post-Hemodialysis Assessment   Post-Treatment Procedures Blood returned;Access bleeding time < 10 minutes   Machine Disinfection Process Exterior Machine Disinfection   Dialyzer Clearance Lightly streaked   Duration of Treatment (minutes) 180 minutes   Hemodialysis Intake (ml) 300 ml   Hemodialysis Output (ml) 3300 ml   NET Removed (ml) 3000   Tolerated Treatment Good   Bilateral Breath Sounds Clear;Diminished   Edema Right upper extremity;Left upper extremity;Right lower extremity;Left lower extremity   RUE Edema +1   LUE Edema +1   RLE Edema +1   LLE Edema +1   Time Off 1130   Patient Disposition Return to room   Observations & Evaluations   Level of Consciousness 0   Heart Rhythm Regular   Respiratory Quality/Effort Unlabored   O2 Device Nasal cannula   Skin Color Pink   Skin Condition/Temp Dry;Warm   Abdomen Inspection Soft   Bowel Sounds (All Quadrants) Active

## 2025-04-30 NOTE — PLAN OF CARE
Problem: ABCDS Injury Assessment  Goal: Absence of physical injury  4/29/2025 2225 by Jaimie Vo, RN  Outcome: Progressing     Problem: Skin/Tissue Integrity  Goal: Skin integrity remains intact  Description: 1.  Monitor for areas of redness and/or skin breakdown2.  Assess vascular access sites hourly3.  Every 4-6 hours minimum:  Change oxygen saturation probe site4.  Every 4-6 hours:  If on nasal continuous positive airway pressure, respiratory therapy assess nares and determine need for appliance change or resting period  4/29/2025 2225 by Jaimie Vo, RN  Outcome: Progressing     Problem: Discharge Planning  Goal: Discharge to home or other facility with appropriate resources  4/29/2025 2225 by Jaimie Vo, RN  Outcome: Progressing

## 2025-04-30 NOTE — PROGRESS NOTES
[facility] Hospitalist Progress Note    Admitting Date and Time: 4/28/2025  1:24 PM  Admit Dx: Hyperkalemia [E87.5]  Anemia [D64.9]  Anemia of chronic disease [D63.8]  Acute renal failure superimposed on chronic kidney disease, unspecified acute renal failure type, unspecified CKD stage [N17.9, N18.9]    Subjective:  Patient is being followed for Hyperkalemia [E87.5]  Anemia [D64.9]  Anemia of chronic disease [D63.8]  Acute renal failure superimposed on chronic kidney disease, unspecified acute renal failure type, unspecified CKD stage [N17.9, N18.9]   Pt feels dyspnea, intermittent chest pain. Seen after her HD. Denies active bleeding.  Per RN: No major concerns.    ROS: denies fever, chills, cp, sob, n/v, HA unless stated above.      insulin lispro  2 Units SubCUTAneous TID WC    insulin glargine  4 Units SubCUTAneous BID    amLODIPine  10 mg Oral Daily    aspirin  81 mg Oral Daily    carvedilol  25 mg Oral Dinner    ferrous sulfate  325 mg Oral Every Other Day    gabapentin  100 mg Oral BID    multivitamin  1 tablet Oral Daily    nicotine  1 patch TransDERmal Q24H    pantoprazole  40 mg Oral QAM AC    insulin lispro  0-4 Units SubCUTAneous 4x Daily AC & HS     sodium chloride, , PRN  white petrolatum, , BID PRN  albuterol, 5 mg, 4x Daily PRN  diphenhydrAMINE, 25 mg, Q6H PRN  HYDROcodone-acetaminophen, 2 tablet, Q6H PRN  magnesium hydroxide, 30 mL, Daily PRN  tiZANidine, 4 mg, Daily PRN  glucose, 4 tablet, PRN  dextrose bolus, 125 mL, PRN   Or  dextrose bolus, 250 mL, PRN  glucagon (rDNA), 1 mg, PRN  dextrose, , Continuous PRN  morphine, 2 mg, Q4H PRN  sodium chloride, , PRN         Objective:    /64   Pulse 92   Temp 98.2 °F (36.8 °C) (Oral)   Resp 18   Ht 1.499 m (4' 11\")   Wt 65.3 kg (143 lb 15.4 oz)   SpO2 95%   BMI 29.08 kg/m²     General Appearance: alert and oriented to person, place and time and in no acute distress, 5L oxygen, obese, Facial puffiness  Skin: warm and dry  Head:

## 2025-05-01 LAB
ANION GAP SERPL CALCULATED.3IONS-SCNC: 15 MMOL/L (ref 7–16)
BUN SERPL-MCNC: 51 MG/DL (ref 6–20)
CALCIUM SERPL-MCNC: 8.9 MG/DL (ref 8.6–10.2)
CHLORIDE SERPL-SCNC: 97 MMOL/L (ref 98–107)
CO2 SERPL-SCNC: 26 MMOL/L (ref 22–29)
CREAT SERPL-MCNC: 9 MG/DL (ref 0.5–1)
ERYTHROCYTE [DISTWIDTH] IN BLOOD BY AUTOMATED COUNT: 16 % (ref 11.5–15)
GFR, ESTIMATED: 5 ML/MIN/1.73M2
GLUCOSE BLD-MCNC: 100 MG/DL (ref 74–99)
GLUCOSE BLD-MCNC: 135 MG/DL (ref 74–99)
GLUCOSE BLD-MCNC: 155 MG/DL (ref 74–99)
GLUCOSE BLD-MCNC: 199 MG/DL (ref 74–99)
GLUCOSE SERPL-MCNC: 108 MG/DL (ref 74–99)
HCT VFR BLD AUTO: 22.5 % (ref 34–48)
HGB BLD-MCNC: 7.3 G/DL (ref 11.5–15.5)
MCH RBC QN AUTO: 29.4 PG (ref 26–35)
MCHC RBC AUTO-ENTMCNC: 32.4 G/DL (ref 32–34.5)
MCV RBC AUTO: 90.7 FL (ref 80–99.9)
PLATELET # BLD AUTO: 203 K/UL (ref 130–450)
PMV BLD AUTO: 9.3 FL (ref 7–12)
POTASSIUM SERPL-SCNC: 5.3 MMOL/L (ref 3.5–5)
RBC # BLD AUTO: 2.48 M/UL (ref 3.5–5.5)
SODIUM SERPL-SCNC: 138 MMOL/L (ref 132–146)
WBC OTHER # BLD: 4.5 K/UL (ref 4.5–11.5)

## 2025-05-01 PROCEDURE — 99232 SBSQ HOSP IP/OBS MODERATE 35: CPT | Performed by: STUDENT IN AN ORGANIZED HEALTH CARE EDUCATION/TRAINING PROGRAM

## 2025-05-01 PROCEDURE — 6370000000 HC RX 637 (ALT 250 FOR IP): Performed by: INTERNAL MEDICINE

## 2025-05-01 PROCEDURE — 80048 BASIC METABOLIC PNL TOTAL CA: CPT

## 2025-05-01 PROCEDURE — 6360000002 HC RX W HCPCS: Performed by: INTERNAL MEDICINE

## 2025-05-01 PROCEDURE — 2700000000 HC OXYGEN THERAPY PER DAY

## 2025-05-01 PROCEDURE — 99232 SBSQ HOSP IP/OBS MODERATE 35: CPT | Performed by: INTERNAL MEDICINE

## 2025-05-01 PROCEDURE — 82962 GLUCOSE BLOOD TEST: CPT

## 2025-05-01 PROCEDURE — 6370000000 HC RX 637 (ALT 250 FOR IP): Performed by: STUDENT IN AN ORGANIZED HEALTH CARE EDUCATION/TRAINING PROGRAM

## 2025-05-01 PROCEDURE — 90935 HEMODIALYSIS ONE EVALUATION: CPT

## 2025-05-01 PROCEDURE — 2060000000 HC ICU INTERMEDIATE R&B

## 2025-05-01 PROCEDURE — 85027 COMPLETE CBC AUTOMATED: CPT

## 2025-05-01 RX ADMIN — MORPHINE SULFATE 2 MG: 2 INJECTION, SOLUTION INTRAMUSCULAR; INTRAVENOUS at 01:06

## 2025-05-01 RX ADMIN — DIPHENHYDRAMINE HCL 25 MG: 25 TABLET ORAL at 20:59

## 2025-05-01 RX ADMIN — CARVEDILOL 25 MG: 25 TABLET, FILM COATED ORAL at 17:21

## 2025-05-01 RX ADMIN — HYDROCODONE BITARTRATE AND ACETAMINOPHEN 2 TABLET: 5; 325 TABLET ORAL at 06:06

## 2025-05-01 RX ADMIN — INSULIN LISPRO 1 UNITS: 100 INJECTION, SOLUTION INTRAVENOUS; SUBCUTANEOUS at 17:24

## 2025-05-01 RX ADMIN — SEVELAMER CARBONATE 800 MG: 800 TABLET, FILM COATED ORAL at 17:21

## 2025-05-01 RX ADMIN — POLYETHYLENE GLYCOL 3350 17 G: 17 POWDER, FOR SOLUTION ORAL at 11:32

## 2025-05-01 RX ADMIN — SEVELAMER CARBONATE 800 MG: 800 TABLET, FILM COATED ORAL at 11:31

## 2025-05-01 RX ADMIN — FERROUS SULFATE TAB 325 MG (65 MG ELEMENTAL FE) 325 MG: 325 (65 FE) TAB at 11:46

## 2025-05-01 RX ADMIN — ASPIRIN 81 MG: 81 TABLET, CHEWABLE ORAL at 11:32

## 2025-05-01 RX ADMIN — SALINE NASAL SPRAY 1 SPRAY: 1.5 SOLUTION NASAL at 17:28

## 2025-05-01 RX ADMIN — AMLODIPINE BESYLATE 10 MG: 10 TABLET ORAL at 11:31

## 2025-05-01 RX ADMIN — PANTOPRAZOLE SODIUM 40 MG: 40 TABLET, DELAYED RELEASE ORAL at 11:31

## 2025-05-01 RX ADMIN — MULTIVITAMIN TABLET 1 TABLET: TABLET at 11:31

## 2025-05-01 RX ADMIN — MORPHINE SULFATE 2 MG: 2 INJECTION, SOLUTION INTRAMUSCULAR; INTRAVENOUS at 20:59

## 2025-05-01 RX ADMIN — HYDROCODONE BITARTRATE AND ACETAMINOPHEN 2 TABLET: 5; 325 TABLET ORAL at 17:21

## 2025-05-01 RX ADMIN — INSULIN LISPRO 3 UNITS: 100 INJECTION, SOLUTION INTRAVENOUS; SUBCUTANEOUS at 11:32

## 2025-05-01 RX ADMIN — GABAPENTIN 100 MG: 100 CAPSULE ORAL at 17:21

## 2025-05-01 RX ADMIN — MORPHINE SULFATE 2 MG: 2 INJECTION, SOLUTION INTRAMUSCULAR; INTRAVENOUS at 11:46

## 2025-05-01 RX ADMIN — INSULIN LISPRO 3 UNITS: 100 INJECTION, SOLUTION INTRAVENOUS; SUBCUTANEOUS at 17:23

## 2025-05-01 RX ADMIN — INSULIN GLARGINE 5 UNITS: 100 INJECTION, SOLUTION SUBCUTANEOUS at 21:01

## 2025-05-01 ASSESSMENT — PAIN SCALES - GENERAL
PAINLEVEL_OUTOF10: 8
PAINLEVEL_OUTOF10: 3
PAINLEVEL_OUTOF10: 8
PAINLEVEL_OUTOF10: 0
PAINLEVEL_OUTOF10: 7
PAINLEVEL_OUTOF10: 8

## 2025-05-01 ASSESSMENT — PAIN DESCRIPTION - LOCATION
LOCATION: LEG;ANKLE
LOCATION: ANKLE;FOOT
LOCATION: ANKLE;FOOT

## 2025-05-01 ASSESSMENT — PAIN DESCRIPTION - ORIENTATION
ORIENTATION: LEFT
ORIENTATION: LEFT

## 2025-05-01 ASSESSMENT — PAIN SCALES - WONG BAKER: WONGBAKER_NUMERICALRESPONSE: NO HURT

## 2025-05-01 ASSESSMENT — PAIN DESCRIPTION - DESCRIPTORS
DESCRIPTORS: ACHING;SORE
DESCRIPTORS: ACHING;DISCOMFORT
DESCRIPTORS: ACHING;DISCOMFORT

## 2025-05-01 NOTE — PROGRESS NOTES
Nephrology Consult  [facility]  Severo France MD    CC:   esrd    HPI:      4/29:  Michelle JOSÉ LUIS Nettles is a 39 y.o. female with a past medical history of anemia of stage V CKD, recent left trimalleolar fracture, ESRD, poorly controlled IDDM, chronic HFpEF, & hidradenitis suppurativa who presented to the ED from [facility] for fatigue with low Hgb.  She has had recurrent admissions for the same thing.her hemoglobin here on admission was 5.3. she has received 2 units of prbc. Repeat hg is 7.6. she is now being seen on hd. She feels better after the blood transfusion. She has no cp, sob, headache, cough now. No diarrhea, melena, brbpr. She is tolerating hd. She is volume overloaded and her goal is 2.7 L.other labs show na 133, k 5.3>6.3, co2 21, bun 76, cr 11.1, ca 8.5, alb 3.7, wbc 5.8, plt 203. Vitals show temp 98.5, rr 18, hr 103, bp 121/69.  She is  S/p repeat EGD showing gastritis & Schatzki's ring but no source of bleeding during admission in march 2025. C scope was nonrevealing.  She has had internal hemorrhoids back in July 2024.     Interval History:    4/30: pt seen and examined. Sp hd today for 3 L,  tolerated well. Sob improved    5/1: pt seen and examined. Sp hd today for 2.7 L, sob improving. Being seen by gi. No diarrhea, brbpr, melena, abd pain    PMH:    Past Medical History:   Diagnosis Date    JOLLY (acute kidney injury) 04/05/2016    Anemia     AVF (arteriovenous fistula) 02/19/2018    let arm    Cellulitis, face     CHF (congestive heart failure) ([facility])     Chronic kidney disease     Chronic respiratory failure with hypoxia ([facility])     Dialysis AV fistula malfunction, initial encounter 11/07/2019    Displaced trimalleolar fracture of left lower leg, closed, initial encounter 02/22/2025    DM type 1 (diabetes mellitus, type 1) ([facility])     Encounter regarding vascular access for dialysis for ESRD ([facility]) 07/12/2018    ESRD (end stage renal disease) ([facility])     Hemodialysis

## 2025-05-01 NOTE — PROGRESS NOTES
ENDOCRINOLOGY PROGRESS NOTE    Date of Service: 5/1/2025  Date of Admission: 4/28/2025  Admitting Physician: Sudheer Liao MD   Primary Care Physician: Rogers Rodríguez MD  Consultant physician: Brennan yDer MD     Reason for the consultation:  Uncontrolled DM     History of Present Illness:  The history is provided by the patient. Accuracy of the patient data is excellent.      Subjective:  Patient seen and examined, no overnight major events. Today she had dialysis. Appetite is better. Denied any fever, nausea, vomiting, SOB, or palpitations.      Inpatient diet:   Carb Restricted diet     Point of care glucose monitoring (Independently reviewed)   Recent Labs     04/29/25  1650 04/29/25  2124 04/30/25  0604 04/30/25  1235 04/30/25  1734 04/30/25  2005 05/01/25  0708 05/01/25  1126   POCGLU 292* 295* 206* 305* 256* 281* 100* 155*       Scheduled Meds:   sevelamer  800 mg Oral TID WC    polyethylene glycol  17 g Oral Daily    insulin lispro  3 Units SubCUTAneous TID WC    insulin glargine  5 Units SubCUTAneous Nightly    amLODIPine  10 mg Oral Daily    aspirin  81 mg Oral Daily    carvedilol  25 mg Oral Dinner    ferrous sulfate  325 mg Oral Every Other Day    gabapentin  100 mg Oral BID    multivitamin  1 tablet Oral Daily    nicotine  1 patch TransDERmal Q24H    pantoprazole  40 mg Oral QAM AC    insulin lispro  0-4 Units SubCUTAneous 4x Daily AC & HS     PRN Meds:   sodium chloride, , PRN  white petrolatum, , BID PRN  albuterol, 5 mg, 4x Daily PRN  diphenhydrAMINE, 25 mg, Q6H PRN  HYDROcodone-acetaminophen, 2 tablet, Q6H PRN  magnesium hydroxide, 30 mL, Daily PRN  tiZANidine, 4 mg, Daily PRN  glucose, 4 tablet, PRN  dextrose bolus, 125 mL, PRN   Or  dextrose bolus, 250 mL, PRN  glucagon (rDNA), 1 mg, PRN  dextrose, , Continuous PRN  morphine, 2 mg, Q4H PRN  sodium chloride, , PRN      Continuous Infusions:   sodium chloride      dextrose      sodium chloride         Review of Systems  All systems reviewed. All

## 2025-05-01 NOTE — PLAN OF CARE
Problem: Skin/Tissue Integrity  Goal: Skin integrity remains intact  Description: 1.  Monitor for areas of redness and/or skin breakdown2.  Assess vascular access sites hourly3.  Every 4-6 hours minimum:  Change oxygen saturation probe site4.  Every 4-6 hours:  If on nasal continuous positive airway pressure, respiratory therapy assess nares and determine need for appliance change or resting period  Outcome: Progressing  Skin Integrity Remains Intact: Monitor for areas of redness and/or skin breakdown     Problem: ABCDS Injury Assessment  Goal: Absence of physical injury  Outcome: Progressing     Problem: Chronic Conditions and Co-morbidities  Goal: Patient's chronic conditions and co-morbidity symptoms are monitored and maintained or improved  Outcome: Progressing  Care Plan - Patient's Chronic Conditions and Co-Morbidity Symptoms are Monitored and Maintained or Improved: Monitor and assess patient's chronic conditions and comorbid symptoms for stability, deterioration, or improvement

## 2025-05-01 NOTE — FLOWSHEET NOTE
Pt completed 3.5 hrs of HD on a 2K bath with 2.7L of UF removed safely. Post report to Haider RODRIGUEZ   05/01/25 1045   Vital Signs   /63   Temp 98 °F (36.7 °C)   Pulse 96   Respirations 18   Pain Assessment   Pain Assessment 0-10   Pain Level 0   Post-Hemodialysis Assessment   Post-Treatment Procedures Blood returned;Access bleeding time < 10 minutes   Machine Disinfection Process Acid/Vinegar Clean;Heat Disinfect;Exterior Machine Disinfection   Rinseback Volume (ml) 300 ml   Blood Volume Processed (Liters) 69.9 L   Dialyzer Clearance Moderately streaked   Duration of Treatment (minutes) 180 minutes   Hemodialysis Intake (ml) 300 ml   Hemodialysis Output (ml) 3000 ml   NET Removed (ml) 2700   Tolerated Treatment Good   Patient Response to Treatment tolerated well; blood returned; stasis achieved; post report to Haider RODRIGUEZ   Bilateral Breath Sounds Diminished   Edema Facial   Time Off 1034   Patient Disposition Return to room   Observations & Evaluations   Level of Consciousness 0   Oriented X 3   Heart Rhythm Regular   Respiratory Quality/Effort Unlabored   O2 Device Nasal cannula   Skin Condition/Temp Dry;Warm   Abdomen Inspection Soft   Bowel Sounds (All Quadrants) Active   Handoff   Handoff Given To Haider Reeves RN   Handoff Received From Ally Lee RN   Handoff Communication Telephone   Time Handoff Given 1100

## 2025-05-01 NOTE — CARE COORDINATION
SBFT planned for 5/2. Pt is ESRD and receives HD at [facility]. Pt will need to have HD prior to returning to [facility]. Pt is a bedhold/LTC at [facility], no precert needed to return. Envelope, transport form and SYLVIE completed. Will follow.  NOLAN Lee, RN  [facility] Case Management  [phone]

## 2025-05-01 NOTE — PROGRESS NOTES
P Quality Flow/Interdisciplinary Rounds Progress Note        Quality Flow Rounds held on May 1, 2025    Disciplines Attending:  Bedside Nurse, , , and Nursing Unit Leadership    Michellecorina Nettles was admitted on 4/28/2025  1:24 PM    Anticipated Discharge Date:  Expected Discharge Date: 05/02/25    Disposition:    Edgar Score:  Edgar Scale Score: 17    [facility] RISK OF UNPLANNED READMISSION 2.0             43.6 Total Score        Discussed patient goal for the day, patient clinical progression, and barriers to discharge.  The following Goal(s) of the Day/Commitment(s) have been identified:   HD , SBFT in the AM, npo at midnight monitor labs.       Maty Busch RN  May 1, 2025

## 2025-05-01 NOTE — PROGRESS NOTES
[facility] Hospitalist Progress Note    Admitting Date and Time: 4/28/2025  1:24 PM  Admit Dx: Hyperkalemia [E87.5]  Anemia [D64.9]  Anemia of chronic disease [D63.8]  Acute renal failure superimposed on chronic kidney disease, unspecified acute renal failure type, unspecified CKD stage [N17.9, N18.9]    Subjective:  Patient is being followed for Hyperkalemia [E87.5]  Anemia [D64.9]  Anemia of chronic disease [D63.8]  Acute renal failure superimposed on chronic kidney disease, unspecified acute renal failure type, unspecified CKD stage [N17.9, N18.9]   Pt feels dyspnea, intermittent chest pain. Seen after her HD. Denies active bleeding.  Per RN: No major concerns.    ROS: denies fever, chills, cp, sob, n/v, HA unless stated above.      sevelamer  800 mg Oral TID WC    polyethylene glycol  17 g Oral Daily    insulin lispro  3 Units SubCUTAneous TID WC    insulin glargine  5 Units SubCUTAneous Nightly    amLODIPine  10 mg Oral Daily    aspirin  81 mg Oral Daily    carvedilol  25 mg Oral Dinner    ferrous sulfate  325 mg Oral Every Other Day    gabapentin  100 mg Oral BID    multivitamin  1 tablet Oral Daily    nicotine  1 patch TransDERmal Q24H    pantoprazole  40 mg Oral QAM AC    insulin lispro  0-4 Units SubCUTAneous 4x Daily AC & HS     sodium chloride, , PRN  white petrolatum, , BID PRN  albuterol, 5 mg, 4x Daily PRN  diphenhydrAMINE, 25 mg, Q6H PRN  HYDROcodone-acetaminophen, 2 tablet, Q6H PRN  magnesium hydroxide, 30 mL, Daily PRN  tiZANidine, 4 mg, Daily PRN  glucose, 4 tablet, PRN  dextrose bolus, 125 mL, PRN   Or  dextrose bolus, 250 mL, PRN  glucagon (rDNA), 1 mg, PRN  dextrose, , Continuous PRN  morphine, 2 mg, Q4H PRN  sodium chloride, , PRN         Objective:    /60   Pulse (!) 102   Temp 98.6 °F (37 °C) (Oral)   Resp 20   Ht 1.499 m (4' 11\")   Wt 62.3 kg (137 lb 5.6 oz)   SpO2 100%   BMI 27.74 kg/m²     General Appearance: alert and oriented to person, place and time and in no

## 2025-05-01 NOTE — PLAN OF CARE
Problem: ABCDS Injury Assessment  Goal: Absence of physical injury  5/1/2025 1312 by Haider Reeves RN  Outcome: Progressing  4/30/2025 2325 by Jaimie Vo RN  Outcome: Progressing     Problem: Skin/Tissue Integrity  Goal: Skin integrity remains intact  Description: 1.  Monitor for areas of redness and/or skin breakdown2.  Assess vascular access sites hourly3.  Every 4-6 hours minimum:  Change oxygen saturation probe site4.  Every 4-6 hours:  If on nasal continuous positive airway pressure, respiratory therapy assess nares and determine need for appliance change or resting period  5/1/2025 1312 by Haider Reeves RN  Outcome: Progressing  4/30/2025 2325 by Jaimie Vo RN  Outcome: Progressing  Flowsheets (Taken 4/30/2025 1227 by Estrella Fernandes, RN)  Skin Integrity Remains Intact: Monitor for areas of redness and/or skin breakdown     Problem: Discharge Planning  Goal: Discharge to home or other facility with appropriate resources  5/1/2025 1312 by Haider Reeves RN  Outcome: Progressing  4/30/2025 2325 by Jaimie Vo RN  Outcome: Progressing  Flowsheets (Taken 4/30/2025 1227 by Estrella Fernandes, RN)  Discharge to home or other facility with appropriate resources: Identify barriers to discharge with patient and caregiver

## 2025-05-02 ENCOUNTER — APPOINTMENT (OUTPATIENT)
Dept: GENERAL RADIOLOGY | Age: 40
DRG: 291 | End: 2025-05-02
Payer: MEDICARE

## 2025-05-02 LAB
ALBUMIN SERPL-MCNC: 3 G/DL (ref 3.5–4.7)
ALPHA1 GLOB SERPL ELPH-MCNC: 0.3 G/DL (ref 0.2–0.4)
ALPHA2 GLOB SERPL ELPH-MCNC: 1 G/DL (ref 0.5–1)
ANION GAP SERPL CALCULATED.3IONS-SCNC: 14 MMOL/L (ref 7–16)
B-GLOBULIN SERPL ELPH-MCNC: 0.9 G/DL (ref 0.8–1.3)
BUN SERPL-MCNC: 34 MG/DL (ref 6–20)
CALCIUM SERPL-MCNC: 9.4 MG/DL (ref 8.6–10.2)
CHLORIDE SERPL-SCNC: 98 MMOL/L (ref 98–107)
CO2 SERPL-SCNC: 28 MMOL/L (ref 22–29)
CREAT SERPL-MCNC: 7.3 MG/DL (ref 0.5–1)
FERRITIN SERPL-MCNC: 453 NG/ML
FOLATE SERPL-MCNC: 14.2 NG/ML (ref 4.6–34.8)
GAMMA GLOB SERPL ELPH-MCNC: 1.5 G/DL (ref 0.7–1.6)
GFR, ESTIMATED: 7 ML/MIN/1.73M2
GLUCOSE BLD-MCNC: 155 MG/DL (ref 74–99)
GLUCOSE BLD-MCNC: 182 MG/DL (ref 74–99)
GLUCOSE BLD-MCNC: 237 MG/DL (ref 74–99)
GLUCOSE BLD-MCNC: 98 MG/DL (ref 74–99)
GLUCOSE SERPL-MCNC: 234 MG/DL (ref 74–99)
IRON SATN MFR SERPL: 17 % (ref 15–50)
IRON SERPL-MCNC: 33 UG/DL (ref 37–145)
PATHOLOGIST: ABNORMAL
POTASSIUM SERPL-SCNC: 4.8 MMOL/L (ref 3.5–5)
PROT PATTERN SERPL ELPH-IMP: ABNORMAL
PROT SERPL-MCNC: 6.6 G/DL (ref 6.4–8.3)
SODIUM SERPL-SCNC: 140 MMOL/L (ref 132–146)
TIBC SERPL-MCNC: 195 UG/DL (ref 250–450)
VIT B12 SERPL-MCNC: 1512 PG/ML (ref 232–1245)

## 2025-05-02 PROCEDURE — 2060000000 HC ICU INTERMEDIATE R&B

## 2025-05-02 PROCEDURE — 80048 BASIC METABOLIC PNL TOTAL CA: CPT

## 2025-05-02 PROCEDURE — 6360000004 HC RX CONTRAST MEDICATION: Performed by: RADIOLOGY

## 2025-05-02 PROCEDURE — 6370000000 HC RX 637 (ALT 250 FOR IP): Performed by: INTERNAL MEDICINE

## 2025-05-02 PROCEDURE — 2700000000 HC OXYGEN THERAPY PER DAY

## 2025-05-02 PROCEDURE — 82728 ASSAY OF FERRITIN: CPT

## 2025-05-02 PROCEDURE — 82746 ASSAY OF FOLIC ACID SERUM: CPT

## 2025-05-02 PROCEDURE — 84155 ASSAY OF PROTEIN SERUM: CPT

## 2025-05-02 PROCEDURE — 83540 ASSAY OF IRON: CPT

## 2025-05-02 PROCEDURE — 83550 IRON BINDING TEST: CPT

## 2025-05-02 PROCEDURE — 99232 SBSQ HOSP IP/OBS MODERATE 35: CPT | Performed by: INTERNAL MEDICINE

## 2025-05-02 PROCEDURE — 84165 PROTEIN E-PHORESIS SERUM: CPT

## 2025-05-02 PROCEDURE — 6360000002 HC RX W HCPCS: Performed by: INTERNAL MEDICINE

## 2025-05-02 PROCEDURE — 74250 X-RAY XM SM INT 1CNTRST STD: CPT

## 2025-05-02 PROCEDURE — 82607 VITAMIN B-12: CPT

## 2025-05-02 PROCEDURE — 82962 GLUCOSE BLOOD TEST: CPT

## 2025-05-02 PROCEDURE — 99232 SBSQ HOSP IP/OBS MODERATE 35: CPT | Performed by: STUDENT IN AN ORGANIZED HEALTH CARE EDUCATION/TRAINING PROGRAM

## 2025-05-02 PROCEDURE — 36415 COLL VENOUS BLD VENIPUNCTURE: CPT

## 2025-05-02 RX ORDER — DIATRIZOATE MEGLUMINE AND DIATRIZOATE SODIUM 660; 100 MG/ML; MG/ML
120 SOLUTION ORAL; RECTAL
Status: DISCONTINUED | OUTPATIENT
Start: 2025-05-02 | End: 2025-05-04 | Stop reason: HOSPADM

## 2025-05-02 RX ADMIN — SEVELAMER CARBONATE 800 MG: 800 TABLET, FILM COATED ORAL at 17:09

## 2025-05-02 RX ADMIN — AMLODIPINE BESYLATE 10 MG: 10 TABLET ORAL at 12:54

## 2025-05-02 RX ADMIN — MORPHINE SULFATE 2 MG: 2 INJECTION, SOLUTION INTRAMUSCULAR; INTRAVENOUS at 20:28

## 2025-05-02 RX ADMIN — MULTIVITAMIN TABLET 1 TABLET: TABLET at 12:55

## 2025-05-02 RX ADMIN — ASPIRIN 81 MG: 81 TABLET, CHEWABLE ORAL at 12:55

## 2025-05-02 RX ADMIN — DIATRIZOATE MEGLUMINE AND DIATRIZOATE SODIUM 120 ML: 660; 100 LIQUID ORAL; RECTAL at 09:06

## 2025-05-02 RX ADMIN — INSULIN GLARGINE 5 UNITS: 100 INJECTION, SOLUTION SUBCUTANEOUS at 20:31

## 2025-05-02 RX ADMIN — SEVELAMER CARBONATE 800 MG: 800 TABLET, FILM COATED ORAL at 12:55

## 2025-05-02 RX ADMIN — MORPHINE SULFATE 2 MG: 2 INJECTION, SOLUTION INTRAMUSCULAR; INTRAVENOUS at 13:06

## 2025-05-02 RX ADMIN — INSULIN LISPRO 2 UNITS: 100 INJECTION, SOLUTION INTRAVENOUS; SUBCUTANEOUS at 12:55

## 2025-05-02 RX ADMIN — INSULIN LISPRO 3 UNITS: 100 INJECTION, SOLUTION INTRAVENOUS; SUBCUTANEOUS at 17:09

## 2025-05-02 RX ADMIN — INSULIN LISPRO 3 UNITS: 100 INJECTION, SOLUTION INTRAVENOUS; SUBCUTANEOUS at 12:55

## 2025-05-02 RX ADMIN — INSULIN LISPRO 1 UNITS: 100 INJECTION, SOLUTION INTRAVENOUS; SUBCUTANEOUS at 17:09

## 2025-05-02 RX ADMIN — CARVEDILOL 25 MG: 25 TABLET, FILM COATED ORAL at 17:08

## 2025-05-02 RX ADMIN — GABAPENTIN 100 MG: 100 CAPSULE ORAL at 17:08

## 2025-05-02 ASSESSMENT — PAIN DESCRIPTION - ORIENTATION
ORIENTATION: LEFT
ORIENTATION: LEFT

## 2025-05-02 ASSESSMENT — PAIN - FUNCTIONAL ASSESSMENT: PAIN_FUNCTIONAL_ASSESSMENT: ACTIVITIES ARE NOT PREVENTED

## 2025-05-02 ASSESSMENT — PAIN DESCRIPTION - DESCRIPTORS
DESCRIPTORS: THROBBING
DESCRIPTORS: ACHING;DISCOMFORT

## 2025-05-02 ASSESSMENT — PAIN DESCRIPTION - LOCATION
LOCATION: ANKLE
LOCATION: ANKLE

## 2025-05-02 ASSESSMENT — PAIN SCALES - GENERAL
PAINLEVEL_OUTOF10: 7
PAINLEVEL_OUTOF10: 7

## 2025-05-02 NOTE — PROGRESS NOTES
ENDOCRINOLOGY PROGRESS NOTE    Date of Service: 5/2/2025  Date of Admission: 4/28/2025  Admitting Physician: Sudheer Liao MD   Primary Care Physician: Rogers Rodríguez MD  Consultant physician: Brennan Dyer MD     Reason for the consultation:  Uncontrolled DM     History of Present Illness:  The history is provided by the patient. Accuracy of the patient data is excellent.      Subjective:  I saw and examined the patient this afternoon, no acute events overnight, glucose level improving.  No hypoglycemia    Inpatient diet:   Carb Restricted diet     Point of care glucose monitoring (Independently reviewed)   Recent Labs     04/30/25  2005 05/01/25  0708 05/01/25  1126 05/01/25  1648 05/01/25  2058 05/02/25  0606 05/02/25  1244 05/02/25  1611   POCGLU 281* 100* 155* 199* 135* 155* 237* 182*       Scheduled Meds:   sevelamer  800 mg Oral TID WC    polyethylene glycol  17 g Oral Daily    insulin lispro  3 Units SubCUTAneous TID WC    insulin glargine  5 Units SubCUTAneous Nightly    amLODIPine  10 mg Oral Daily    aspirin  81 mg Oral Daily    carvedilol  25 mg Oral Dinner    ferrous sulfate  325 mg Oral Every Other Day    gabapentin  100 mg Oral BID    multivitamin  1 tablet Oral Daily    nicotine  1 patch TransDERmal Q24H    pantoprazole  40 mg Oral QAM AC    insulin lispro  0-4 Units SubCUTAneous 4x Daily AC & HS     PRN Meds:   diatrizoate meglumine-sodium, 120 mL, ONCE PRN  sodium chloride, 1 spray, PRN  sodium chloride, , PRN  white petrolatum, , BID PRN  albuterol, 5 mg, 4x Daily PRN  diphenhydrAMINE, 25 mg, Q6H PRN  HYDROcodone-acetaminophen, 2 tablet, Q6H PRN  magnesium hydroxide, 30 mL, Daily PRN  tiZANidine, 4 mg, Daily PRN  glucose, 4 tablet, PRN  dextrose bolus, 125 mL, PRN   Or  dextrose bolus, 250 mL, PRN  glucagon (rDNA), 1 mg, PRN  dextrose, , Continuous PRN  morphine, 2 mg, Q4H PRN  sodium chloride, , PRN      Continuous Infusions:   sodium chloride      dextrose      sodium chloride         Review

## 2025-05-02 NOTE — PROGRESS NOTES
PROGRESS NOTE        Patient Presents with/Seen in Consultation For      Reason for Consult: Acute on chronic anemia   CHIEF COMPLAINT: Low hemoglobin   Subjective:     Patient off floor for SBFT    Review of Systems  Aside from what was mentioned in the PMH and HPI, essentially unremarkable, all others negative.    Objective:     /64   Pulse 92   Temp 99 °F (37.2 °C) (Oral)   Resp 18   Ht 1.499 m (4' 11\")   Wt 62.3 kg (137 lb 5.6 oz)   SpO2 100%   BMI 27.74 kg/m²     Defer physical exam patient off floor    diatrizoate meglumine-sodium (GASTROGRAFIN) 66-10 % solution 120 mL, ONCE PRN  sodium chloride (OCEAN, BABY AYR) 0.65 % nasal spray 1 spray, PRN  sevelamer (RENVELA) tablet 800 mg, TID WC  polyethylene glycol (GLYCOLAX) packet 17 g, Daily  insulin lispro (HUMALOG,ADMELOG) injection vial 3 Units, TID WC  insulin glargine (LANTUS) injection vial 5 Units, Nightly  0.9 % sodium chloride infusion, PRN  white petrolatum ointment, BID PRN  albuterol (PROVENTIL) (2.5 MG/3ML) 0.083% nebulizer solution 5 mg, 4x Daily PRN  amLODIPine (NORVASC) tablet 10 mg, Daily  aspirin chewable tablet 81 mg, Daily  carvedilol (COREG) tablet 25 mg, Dinner  diphenhydrAMINE (BENADRYL) tablet 25 mg, Q6H PRN  ferrous sulfate (IRON 325) tablet 325 mg, Every Other Day  gabapentin (NEURONTIN) capsule 100 mg, BID  HYDROcodone-acetaminophen (NORCO) 5-325 MG per tablet 2 tablet, Q6H PRN  magnesium hydroxide (MILK OF MAGNESIA) 400 MG/5ML suspension 30 mL, Daily PRN  multivitamin 1 tablet, Daily  nicotine (NICODERM CQ) 21 MG/24HR 1 patch, Q24H  pantoprazole (PROTONIX) tablet 40 mg, QAM AC  tiZANidine (ZANAFLEX) tablet 4 mg, Daily PRN  glucose chewable tablet 16 g, PRN  dextrose bolus 10% 125 mL, PRN   Or  dextrose bolus 10% 250 mL, PRN  glucagon injection 1 mg, PRN  dextrose 10 % infusion, Continuous PRN  insulin lispro (HUMALOG,ADMELOG) injection vial 0-4 Units, 4x Daily AC & HS  morphine (PF) injection 2 mg, Q4H PRN  0.9 % sodium

## 2025-05-02 NOTE — PLAN OF CARE
Problem: ABCDS Injury Assessment  Goal: Absence of physical injury  5/2/2025 1406 by Haider Reeves RN  Outcome: Progressing  5/2/2025 0210 by Nelson Aragon RN  Outcome: Progressing     Problem: Skin/Tissue Integrity  Goal: Skin integrity remains intact  Description: 1.  Monitor for areas of redness and/or skin breakdown2.  Assess vascular access sites hourly3.  Every 4-6 hours minimum:  Change oxygen saturation probe site4.  Every 4-6 hours:  If on nasal continuous positive airway pressure, respiratory therapy assess nares and determine need for appliance change or resting period  5/2/2025 1406 by Haider Reeves, RN  Outcome: Progressing  5/2/2025 0210 by Nelson Aragon RN  Outcome: Progressing     Problem: Discharge Planning  Goal: Discharge to home or other facility with appropriate resources  5/2/2025 1406 by Hiader Reeves RN  Outcome: Progressing  5/2/2025 0210 by Nelson Aragon RN  Outcome: Progressing

## 2025-05-02 NOTE — PROGRESS NOTES
Consulted for lab collection.  Pt is off the floor at procedure, unable to collect lab work at this time.  Please contact IV Team when pt is available in room for lab work.  Spoke with floor RN.    Electronically signed by Ellie Dawson RN on 5/2/2025 at 9:01 AM

## 2025-05-02 NOTE — PROGRESS NOTES
P Quality Flow/Interdisciplinary Rounds Progress Note        Quality Flow Rounds held on May 2, 2025    Disciplines Attending:  Bedside Nurse, , , and Nursing Unit Leadership    Michelle JOSÉ LUIS Nettles was admitted on 4/28/2025  1:24 PM    Anticipated Discharge Date:  Expected Discharge Date: 05/02/25    Disposition:    Edgar Score:  Edgar Scale Score: 17    [facility] RISK OF UNPLANNED READMISSION 2.0             44 Total Score        Discussed patient goal for the day, patient clinical progression, and barriers to discharge.  The following Goal(s) of the Day/Commitment(s) have been identified:   SBFT, cap scope OP, Hd 5 days/week as an OP      Maty Busch, RN  May 2, 2025

## 2025-05-02 NOTE — PLAN OF CARE
Problem: ABCDS Injury Assessment  Goal: Absence of physical injury  5/2/2025 0210 by Nelson Aragon, RN  Outcome: Progressing     Problem: Skin/Tissue Integrity  Goal: Skin integrity remains intact  Description: 1.  Monitor for areas of redness and/or skin breakdown2.  Assess vascular access sites hourly3.  Every 4-6 hours minimum:  Change oxygen saturation probe site4.  Every 4-6 hours:  If on nasal continuous positive airway pressure, respiratory therapy assess nares and determine need for appliance change or resting period  5/2/2025 0210 by Nelson Aragon, RN  Outcome: Progressing     Problem: Discharge Planning  Goal: Discharge to home or other facility with appropriate resources  5/2/2025 0210 by Nelson Aragon, RN  Outcome: Progressing     Problem: Chronic Conditions and Co-morbidities  Goal: Patient's chronic conditions and co-morbidity symptoms are monitored and maintained or improved  Outcome: Progressing     Problem: Pain  Goal: Verbalizes/displays adequate comfort level or baseline comfort level  Outcome: Progressing     Problem: Safety - Adult  Goal: Free from fall injury  Outcome: Progressing

## 2025-05-02 NOTE — CARE COORDINATION
SBFT for today. Renal following, holding UF today. Pt is ESRD and receives HD at [facility]. Pt will need to have HD prior to returning to [facility]. Pt is a bedhold/LTC at [facility], no precert needed to return. Envelope, transport form and SYLVIE completed. Will follow.   NOLAN Lee, RN  [facility] Case Management  [phone]

## 2025-05-02 NOTE — PROGRESS NOTES
[facility] Hospitalist Progress Note    Admitting Date and Time: 4/28/2025  1:24 PM  Admit Dx: Hyperkalemia [E87.5]  Anemia [D64.9]  Anemia of chronic disease [D63.8]  Acute renal failure superimposed on chronic kidney disease, unspecified acute renal failure type, unspecified CKD stage [N17.9, N18.9]    Subjective:  Patient is being followed for Hyperkalemia [E87.5]  Anemia [D64.9]  Anemia of chronic disease [D63.8]  Acute renal failure superimposed on chronic kidney disease, unspecified acute renal failure type, unspecified CKD stage [N17.9, N18.9]   Pt feels dyspnea, intermittent chest pain.  Denies active bleeding. Reports loose stools with abdominal studies.  Per RN: No major concerns.    ROS: denies fever, chills, cp, sob, n/v, HA unless stated above.      sevelamer  800 mg Oral TID WC    polyethylene glycol  17 g Oral Daily    insulin lispro  3 Units SubCUTAneous TID WC    insulin glargine  5 Units SubCUTAneous Nightly    amLODIPine  10 mg Oral Daily    aspirin  81 mg Oral Daily    carvedilol  25 mg Oral Dinner    ferrous sulfate  325 mg Oral Every Other Day    gabapentin  100 mg Oral BID    multivitamin  1 tablet Oral Daily    nicotine  1 patch TransDERmal Q24H    pantoprazole  40 mg Oral QAM AC    insulin lispro  0-4 Units SubCUTAneous 4x Daily AC & HS     diatrizoate meglumine-sodium, 120 mL, ONCE PRN  sodium chloride, 1 spray, PRN  sodium chloride, , PRN  white petrolatum, , BID PRN  albuterol, 5 mg, 4x Daily PRN  diphenhydrAMINE, 25 mg, Q6H PRN  HYDROcodone-acetaminophen, 2 tablet, Q6H PRN  magnesium hydroxide, 30 mL, Daily PRN  tiZANidine, 4 mg, Daily PRN  glucose, 4 tablet, PRN  dextrose bolus, 125 mL, PRN   Or  dextrose bolus, 250 mL, PRN  glucagon (rDNA), 1 mg, PRN  dextrose, , Continuous PRN  morphine, 2 mg, Q4H PRN  sodium chloride, , PRN         Objective:    /82   Pulse 85   Temp 97.9 °F (36.6 °C) (Oral)   Resp 20   Ht 1.499 m (4' 11\")   Wt 62.3 kg (137 lb 5.6 oz)   SpO2 99%

## 2025-05-03 LAB
ABO + RH BLD: NORMAL
ALBUMIN SERPL-MCNC: 3.2 G/DL (ref 3.5–5.2)
ALP SERPL-CCNC: 99 U/L (ref 35–104)
ALT SERPL-CCNC: 6 U/L (ref 0–32)
ANION GAP SERPL CALCULATED.3IONS-SCNC: 14 MMOL/L (ref 7–16)
ARM BAND NUMBER: NORMAL
AST SERPL-CCNC: 13 U/L (ref 0–31)
BILIRUB SERPL-MCNC: 0.2 MG/DL (ref 0–1.2)
BLOOD BANK SAMPLE EXPIRATION: NORMAL
BLOOD GROUP ANTIBODIES SERPL: NEGATIVE
BUN SERPL-MCNC: 38 MG/DL (ref 6–20)
CALCIUM SERPL-MCNC: 9 MG/DL (ref 8.6–10.2)
CHLORIDE SERPL-SCNC: 99 MMOL/L (ref 98–107)
CO2 SERPL-SCNC: 25 MMOL/L (ref 22–29)
CREAT SERPL-MCNC: 8.2 MG/DL (ref 0.5–1)
ERYTHROCYTE [DISTWIDTH] IN BLOOD BY AUTOMATED COUNT: 15.4 % (ref 11.5–15)
GFR, ESTIMATED: 6 ML/MIN/1.73M2
GLUCOSE BLD-MCNC: 193 MG/DL (ref 74–99)
GLUCOSE BLD-MCNC: 208 MG/DL (ref 74–99)
GLUCOSE BLD-MCNC: 257 MG/DL (ref 74–99)
GLUCOSE BLD-MCNC: 85 MG/DL (ref 74–99)
GLUCOSE SERPL-MCNC: 142 MG/DL (ref 74–99)
HCT VFR BLD AUTO: 23.1 % (ref 34–48)
HGB BLD-MCNC: 7.2 G/DL (ref 11.5–15.5)
MAGNESIUM SERPL-MCNC: 2.2 MG/DL (ref 1.6–2.6)
MCH RBC QN AUTO: 29.4 PG (ref 26–35)
MCHC RBC AUTO-ENTMCNC: 31.2 G/DL (ref 32–34.5)
MCV RBC AUTO: 94.3 FL (ref 80–99.9)
PHOSPHATE SERPL-MCNC: 6.6 MG/DL (ref 2.5–4.5)
PLATELET # BLD AUTO: 206 K/UL (ref 130–450)
PMV BLD AUTO: 9.2 FL (ref 7–12)
POTASSIUM SERPL-SCNC: 4.9 MMOL/L (ref 3.5–5)
PROT SERPL-MCNC: 7.1 G/DL (ref 6.4–8.3)
RBC # BLD AUTO: 2.45 M/UL (ref 3.5–5.5)
SODIUM SERPL-SCNC: 138 MMOL/L (ref 132–146)
WBC OTHER # BLD: 4.6 K/UL (ref 4.5–11.5)

## 2025-05-03 PROCEDURE — 99232 SBSQ HOSP IP/OBS MODERATE 35: CPT | Performed by: INTERNAL MEDICINE

## 2025-05-03 PROCEDURE — 1200000000 HC SEMI PRIVATE

## 2025-05-03 PROCEDURE — 99232 SBSQ HOSP IP/OBS MODERATE 35: CPT | Performed by: STUDENT IN AN ORGANIZED HEALTH CARE EDUCATION/TRAINING PROGRAM

## 2025-05-03 PROCEDURE — 6370000000 HC RX 637 (ALT 250 FOR IP)

## 2025-05-03 PROCEDURE — 6370000000 HC RX 637 (ALT 250 FOR IP): Performed by: INTERNAL MEDICINE

## 2025-05-03 PROCEDURE — 84100 ASSAY OF PHOSPHORUS: CPT

## 2025-05-03 PROCEDURE — 6370000000 HC RX 637 (ALT 250 FOR IP): Performed by: STUDENT IN AN ORGANIZED HEALTH CARE EDUCATION/TRAINING PROGRAM

## 2025-05-03 PROCEDURE — 6360000002 HC RX W HCPCS: Performed by: INTERNAL MEDICINE

## 2025-05-03 PROCEDURE — 80053 COMPREHEN METABOLIC PANEL: CPT

## 2025-05-03 PROCEDURE — 2700000000 HC OXYGEN THERAPY PER DAY

## 2025-05-03 PROCEDURE — 83735 ASSAY OF MAGNESIUM: CPT

## 2025-05-03 PROCEDURE — 85027 COMPLETE CBC AUTOMATED: CPT

## 2025-05-03 PROCEDURE — 90935 HEMODIALYSIS ONE EVALUATION: CPT

## 2025-05-03 PROCEDURE — 86900 BLOOD TYPING SEROLOGIC ABO: CPT

## 2025-05-03 PROCEDURE — 86850 RBC ANTIBODY SCREEN: CPT

## 2025-05-03 PROCEDURE — 86901 BLOOD TYPING SEROLOGIC RH(D): CPT

## 2025-05-03 PROCEDURE — 82962 GLUCOSE BLOOD TEST: CPT

## 2025-05-03 RX ORDER — ONDANSETRON 4 MG/1
4 TABLET, FILM COATED ORAL EVERY 8 HOURS PRN
Status: DISCONTINUED | OUTPATIENT
Start: 2025-05-03 | End: 2025-05-04 | Stop reason: HOSPADM

## 2025-05-03 RX ORDER — FLUTICASONE PROPIONATE 50 MCG
1 SPRAY, SUSPENSION (ML) NASAL DAILY
Status: DISCONTINUED | OUTPATIENT
Start: 2025-05-03 | End: 2025-05-03 | Stop reason: CLARIF

## 2025-05-03 RX ORDER — FLUTICASONE PROPIONATE 50 MCG
1 SPRAY, SUSPENSION (ML) NASAL DAILY PRN
Status: DISCONTINUED | OUTPATIENT
Start: 2025-05-03 | End: 2025-05-03

## 2025-05-03 RX ORDER — FLUTICASONE PROPIONATE 50 MCG
1 SPRAY, SUSPENSION (ML) NASAL DAILY
Status: DISCONTINUED | OUTPATIENT
Start: 2025-05-03 | End: 2025-05-03

## 2025-05-03 RX ORDER — FLUTICASONE PROPIONATE 50 MCG
2 SPRAY, SUSPENSION (ML) NASAL DAILY
Status: DISCONTINUED | OUTPATIENT
Start: 2025-05-04 | End: 2025-05-04 | Stop reason: HOSPADM

## 2025-05-03 RX ADMIN — CARVEDILOL 25 MG: 25 TABLET, FILM COATED ORAL at 16:18

## 2025-05-03 RX ADMIN — MORPHINE SULFATE 2 MG: 2 INJECTION, SOLUTION INTRAMUSCULAR; INTRAVENOUS at 12:24

## 2025-05-03 RX ADMIN — INSULIN LISPRO 3 UNITS: 100 INJECTION, SOLUTION INTRAVENOUS; SUBCUTANEOUS at 12:18

## 2025-05-03 RX ADMIN — GABAPENTIN 100 MG: 100 CAPSULE ORAL at 12:13

## 2025-05-03 RX ADMIN — ASPIRIN 81 MG: 81 TABLET, CHEWABLE ORAL at 12:13

## 2025-05-03 RX ADMIN — PANTOPRAZOLE SODIUM 40 MG: 40 TABLET, DELAYED RELEASE ORAL at 07:02

## 2025-05-03 RX ADMIN — EPOETIN ALFA-EPBX 6000 UNITS: 3000 INJECTION, SOLUTION INTRAVENOUS; SUBCUTANEOUS at 14:49

## 2025-05-03 RX ADMIN — HYDROCODONE BITARTRATE AND ACETAMINOPHEN 2 TABLET: 5; 325 TABLET ORAL at 00:08

## 2025-05-03 RX ADMIN — DIPHENHYDRAMINE HCL 25 MG: 25 TABLET ORAL at 20:34

## 2025-05-03 RX ADMIN — INSULIN LISPRO 2 UNITS: 100 INJECTION, SOLUTION INTRAVENOUS; SUBCUTANEOUS at 12:17

## 2025-05-03 RX ADMIN — AMLODIPINE BESYLATE 10 MG: 10 TABLET ORAL at 12:13

## 2025-05-03 RX ADMIN — SEVELAMER CARBONATE 800 MG: 800 TABLET, FILM COATED ORAL at 16:18

## 2025-05-03 RX ADMIN — INSULIN GLARGINE 5 UNITS: 100 INJECTION, SOLUTION SUBCUTANEOUS at 20:39

## 2025-05-03 RX ADMIN — MULTIVITAMIN TABLET 1 TABLET: TABLET at 12:24

## 2025-05-03 RX ADMIN — HYDROCODONE BITARTRATE AND ACETAMINOPHEN 2 TABLET: 5; 325 TABLET ORAL at 07:02

## 2025-05-03 RX ADMIN — FLUTICASONE PROPIONATE 1 SPRAY: 50 SPRAY, METERED NASAL at 14:50

## 2025-05-03 RX ADMIN — INSULIN LISPRO 3 UNITS: 100 INJECTION, SOLUTION INTRAVENOUS; SUBCUTANEOUS at 16:18

## 2025-05-03 RX ADMIN — MORPHINE SULFATE 2 MG: 2 INJECTION, SOLUTION INTRAMUSCULAR; INTRAVENOUS at 04:05

## 2025-05-03 RX ADMIN — INSULIN LISPRO 1 UNITS: 100 INJECTION, SOLUTION INTRAVENOUS; SUBCUTANEOUS at 16:18

## 2025-05-03 RX ADMIN — GABAPENTIN 100 MG: 100 CAPSULE ORAL at 16:18

## 2025-05-03 RX ADMIN — FERROUS SULFATE TAB 325 MG (65 MG ELEMENTAL FE) 325 MG: 325 (65 FE) TAB at 12:13

## 2025-05-03 RX ADMIN — HYDROCODONE BITARTRATE AND ACETAMINOPHEN 2 TABLET: 5; 325 TABLET ORAL at 20:34

## 2025-05-03 RX ADMIN — ONDANSETRON HYDROCHLORIDE 4 MG: 4 TABLET, FILM COATED ORAL at 20:34

## 2025-05-03 RX ADMIN — SEVELAMER CARBONATE 800 MG: 800 TABLET, FILM COATED ORAL at 12:13

## 2025-05-03 RX ADMIN — EPOETIN ALFA-EPBX 4000 UNITS: 4000 INJECTION, SOLUTION INTRAVENOUS; SUBCUTANEOUS at 14:49

## 2025-05-03 ASSESSMENT — PAIN DESCRIPTION - LOCATION
LOCATION: LEG
LOCATION: ANKLE
LOCATION: LEG
LOCATION: ANKLE
LOCATION: ANKLE

## 2025-05-03 ASSESSMENT — PAIN DESCRIPTION - DESCRIPTORS
DESCRIPTORS: ACHING
DESCRIPTORS: ACHING
DESCRIPTORS: THROBBING
DESCRIPTORS: ACHING

## 2025-05-03 ASSESSMENT — PAIN DESCRIPTION - ORIENTATION
ORIENTATION: LEFT

## 2025-05-03 ASSESSMENT — PAIN SCALES - GENERAL
PAINLEVEL_OUTOF10: 8
PAINLEVEL_OUTOF10: 7

## 2025-05-03 NOTE — PLAN OF CARE
Problem: ABCDS Injury Assessment  Goal: Absence of physical injury  5/2/2025 2241 by Willow Osborne RN  Outcome: Progressing  5/2/2025 1406 by Haider Reeves RN  Outcome: Progressing     Problem: Skin/Tissue Integrity  Goal: Skin integrity remains intact  Description: 1.  Monitor for areas of redness and/or skin breakdown2.  Assess vascular access sites hourly3.  Every 4-6 hours minimum:  Change oxygen saturation probe site4.  Every 4-6 hours:  If on nasal continuous positive airway pressure, respiratory therapy assess nares and determine need for appliance change or resting period  5/2/2025 2241 by Willow Osborne RN  Outcome: Progressing  5/2/2025 1406 by Haider Reeves RN  Outcome: Progressing     Problem: Discharge Planning  Goal: Discharge to home or other facility with appropriate resources  5/2/2025 2241 by Willow Osborne RN  Outcome: Progressing  5/2/2025 1406 by Haider Reeves RN  Outcome: Progressing     Problem: Chronic Conditions and Co-morbidities  Goal: Patient's chronic conditions and co-morbidity symptoms are monitored and maintained or improved  Outcome: Progressing     Problem: Pain  Goal: Verbalizes/displays adequate comfort level or baseline comfort level  Outcome: Progressing     Problem: Safety - Adult  Goal: Free from fall injury  Outcome: Progressing

## 2025-05-03 NOTE — PROGRESS NOTES
[facility] Hospitalist Progress Note    Admitting Date and Time: 4/28/2025  1:24 PM  Admit Dx: Hyperkalemia [E87.5]  Anemia [D64.9]  Anemia of chronic disease [D63.8]  Acute renal failure superimposed on chronic kidney disease, unspecified acute renal failure type, unspecified CKD stage [N17.9, N18.9]    Subjective:  Patient is being followed for Hyperkalemia [E87.5]  Anemia [D64.9]  Anemia of chronic disease [D63.8]  Acute renal failure superimposed on chronic kidney disease, unspecified acute renal failure type, unspecified CKD stage [N17.9, N18.9]   Pt feels dyspnea, intermittent chest pain.  Denies active bleeding. Reports loose stools improved.Seen getting HD.   Per RN: No major concerns.    ROS: denies fever, chills, cp, sob, n/v, HA unless stated above.      sevelamer  800 mg Oral TID WC    polyethylene glycol  17 g Oral Daily    insulin lispro  3 Units SubCUTAneous TID WC    insulin glargine  5 Units SubCUTAneous Nightly    amLODIPine  10 mg Oral Daily    aspirin  81 mg Oral Daily    carvedilol  25 mg Oral Dinner    ferrous sulfate  325 mg Oral Every Other Day    gabapentin  100 mg Oral BID    multivitamin  1 tablet Oral Daily    nicotine  1 patch TransDERmal Q24H    pantoprazole  40 mg Oral QAM AC    insulin lispro  0-4 Units SubCUTAneous 4x Daily AC & HS     diatrizoate meglumine-sodium, 120 mL, ONCE PRN  sodium chloride, 1 spray, PRN  sodium chloride, , PRN  white petrolatum, , BID PRN  albuterol, 5 mg, 4x Daily PRN  diphenhydrAMINE, 25 mg, Q6H PRN  HYDROcodone-acetaminophen, 2 tablet, Q6H PRN  magnesium hydroxide, 30 mL, Daily PRN  tiZANidine, 4 mg, Daily PRN  glucose, 4 tablet, PRN  dextrose bolus, 125 mL, PRN   Or  dextrose bolus, 250 mL, PRN  glucagon (rDNA), 1 mg, PRN  dextrose, , Continuous PRN  morphine, 2 mg, Q4H PRN  sodium chloride, , PRN         Objective:    /65   Pulse 89   Temp 97.2 °F (36.2 °C) (Axillary)   Resp 18   Ht 1.499 m (4' 11\")   Wt 62.3 kg (137 lb 5.6 oz)

## 2025-05-03 NOTE — CONSULTS
Inpatient Cardiology Consultation      Reason for Consult:  Elevated troponin     Consulting Physician: Dr. Whittington     Requesting Physician:  Dr. Salcido     Date of Consultation: 4/29/2025    HISTORY OF PRESENT ILLNESS:   Michelle Nettles  is a 39 y.o.  female known to [facility] cardiology as inpatient only most recently seen in consultation by Dr. Forman on 3/20/2025 for complaints of chest pain.  Echocardiogram (EF 55-60%), coronary CT angiogram ordered but never obtained due to patient receiving hemodialysis.  Stress test ordered but never obtained due to decrease in hemoglobin.  Coreg increased from 25 Mg daily to twice daily and initiated on amlodipine.     Known to vascular surgery Dr. Zaidi last seen 12/2023 for AV fistulogram.  To see patient again for concerns of recurrent L subclavian, brachiocephalic stenosis.       Recent encounters:     3/2/2025 -3/11/2025: Presented from SNF with complaints of N/V/D.  Found to be anemic with hemoglobin of 5.2, received 4 units PRBC.  EGD normal.  LLE external fixator removed with ORIF per orthopedic surgery.  ASA changed from twice daily to aspirin and Plavix daily.  Discharged on PRN Norco-lidocaine patch and PRN tizanidine for muscle spasms.     3/13/2025-3/25/2025: Admitted for acute on chronic severe anemia, FOBT+.  Received 4 units PRBC.  Plavix, ASA, Lovenox placed on hold.  C-scope, Meckel' nuclear bleeding scan,, tagged RBC, CT, RP US all negative for bleeding.  No further surgical intervention indicated.  Instructed follow-up with GI for PillCam.  Seen by cardiology for complaints of chest discomfort, noted not to be a candidate for ischemic testing due to underlying conditions.     ED report: Presented 4/28/2025 via EMS for low hemoglobin (6.8) on routine lab work prior to hemodialysis and sent to ED for possible blood transfusion.  Chronic supplemental 5L NC O2 use.   Arrival vitals: /70  afebrile O2 sat 99% 5 LNC  Labs: Troponin 
Nephrology Consult  [facility]  Severo France MD    CC:   esrd    HPI:   Michelle Nettles is a 39 y.o. female with a past medical history of anemia of stage V CKD, recent left trimalleolar fracture, ESRD, poorly controlled IDDM, chronic HFpEF, & hidradenitis suppurativa who presented to the ED from [facility] for fatigue with low Hgb.  She has had recurrent admissions for the same thing.her hemoglobin here on admission was 5.3. she has received 2 units of prbc. Repeat hg is 7.6. she is now being seen on hd. She feels better after the blood transfusion. She has no cp, sob, headache, cough now. No diarrhea, melena, brbpr. She is tolerating hd. She is volume overloaded and her goal is 2.7 L.other labs show na 133, k 5.3>6.3, co2 21, bun 76, cr 11.1, ca 8.5, alb 3.7, wbc 5.8, plt 203. Vitals show temp 98.5, rr 18, hr 103, bp 121/69.  She is  S/p repeat EGD showing gastritis & Schatzki's ring but no source of bleeding during admission in march 2025. C scope was nonrevealing.  She has had internal hemorrhoids back in July 2024.     PMH:    Past Medical History:   Diagnosis Date    JOLLY (acute kidney injury) 04/05/2016    Anemia     AVF (arteriovenous fistula) 02/19/2018    let arm    Cellulitis, face     CHF (congestive heart failure) ([facility])     Chronic kidney disease     Chronic respiratory failure with hypoxia ([facility])     Dialysis AV fistula malfunction, initial encounter 11/07/2019    Displaced trimalleolar fracture of left lower leg, closed, initial encounter 02/22/2025    DM type 1 (diabetes mellitus, type 1) ([facility])     Encounter regarding vascular access for dialysis for ESRD ([facility]) 07/12/2018    ESRD (end stage renal disease) ([facility])     Hemodialysis patient     amrita dawson mon wed fri / graft in left arm    Hidradenitis suppurativa     Hypercalcemia     Hyperkalemia     Hypertension     Iron deficiency anemia secondary to blood loss (chronic)     Other acute gastritis without 
OTOLARYNGOLOGY  CONSULT NOTE  5/3/2025    Physician Consulted: Dr. Croft  Reason for Consult: Concern for OM/ETD  Referring Physician: Dr. Omari ORDONEZ Lencho Nettles is a 39 y.o. female who ENT was consulted for evaluation of otitis media and hx of ETD. Patient has PMH of hyperkalemia, anemia of chronic disease, and acute renal failure and ESRD admitted for transfusion and dialysis. Was seen by Dr. Loya on 4/8/25 and a right tympanostomy tube was placed. Patient reports she was placed on medicated drops but finished her regimen roughly 1 week ago. Since that time has experienced mild but persisten ear drainage.    Review of Systems   Constitutional:  Negative for activity change, chills, fatigue and fever.   HENT:  Positive for ear discharge. Negative for congestion, ear pain, nosebleeds, postnasal drip, rhinorrhea, sinus pressure, sinus pain, tinnitus, trouble swallowing and voice change.    Eyes: Negative.    Respiratory: Negative.     Cardiovascular: Negative.    Gastrointestinal: Negative.        Past Medical History:   Diagnosis Date    JOLLY (acute kidney injury) 04/05/2016    Anemia     AVF (arteriovenous fistula) 02/19/2018    let arm    Cellulitis, face     CHF (congestive heart failure) ([facility])     Chronic kidney disease     Chronic respiratory failure with hypoxia ([facility])     Dialysis AV fistula malfunction, initial encounter 11/07/2019    Displaced trimalleolar fracture of left lower leg, closed, initial encounter 02/22/2025    DM type 1 (diabetes mellitus, type 1) ([facility])     Encounter regarding vascular access for dialysis for ESRD ([facility]) 07/12/2018    ESRD (end stage renal disease) ([facility])     Hemodialysis patient     amrita dawson mon wed fri / graft in left arm    Hidradenitis suppurativa     Hypercalcemia     Hyperkalemia     Hypertension     Iron deficiency anemia secondary to blood loss (chronic)     Other acute gastritis without hemorrhage     Tobacco abuse 04/05/2016    Type 2 
MALBCR 1988.2 10/08/2013 10:30 AM     Lab Results   Component Value Date/Time    TRIG 101 03/03/2025 05:30 AM    HDL 30 03/03/2025 05:30 AM    CHOL 112 03/03/2025 05:30 AM       Blood culture   Lab Results   Component Value Date/Time    BC 5 Days no growth 06/03/2023 08:36 AM    BC 5 Days no growth 03/22/2023 02:01 PM       Radiology:  XR ANKLE LEFT (MIN 3 VIEWS)   Final Result   Trimalleolar ankle fracture with anatomic alignment and hardware intact   status post ORIF.             Medical Records/Labs/Images review:   I personally reviewed and summarized previous records   All labs and imaging were reviewed independently     ASSESSMENT & PLAN   Michelle Nettles, a 39 y.o.-old female seen today for inpatient diabetes management    Diabetes Mellitus type 1  Patient was diagnosed with type 1 diabetes at age 5.   The patient's diabetes is very brittle and with ESRD the patient is at high risk of hypoglycemia  We recommend the following DM regimen  Lantus 40 units twice daily  Humalog 3 units 3 times daily with meals  Low dose sliding scale   Continue glucose check with meals and at bedtime  Will titrate insulin dose based on the blood glucose trend & insulin requirement  Upon discharge, I will arrange for the patient to be seen in the endocrinology clinic for routine diabetes maintenance and prevention    Dietary noncompliance  Discussed with patient the importance of eating consistent carbohydrate meals, avoiding high glycemic index food. Also, discussed with patient the risk and negative consequences of dietary noncompliance on blood glucose control, blood pressure and weight    Acute on chronic severe anemia:  Management per primary service    ESRD  On dialysis  Nephrology on board      Interdisciplinary plan for communication with healthcare providers:   Consult recommendations were discussed with the Primary Service/Nursing staff      The above issues were reviewed with the patient who understood and 
discharge from orthopedic standpoint      Electronically signed by Payam Montero DO on 4/29/2025 at 9:12 AM  Note: This report was completed using computerAusra voiced recognition software.  Every effort has been made to ensure accuracy; however, inadvertent computerized transcription errors may be present.    
will order SBFT, nor prior small bowel surgeries nor hx of SBO per the patient/chart.   Sadi Owens, DO

## 2025-05-03 NOTE — PROGRESS NOTES
P Quality Flow/Interdisciplinary Rounds Progress Note        Quality Flow Rounds held on May 3, 2025    Disciplines Attending:  Bedside Nurse    Michelle ORDONEZ Lencho Nettles was admitted on 4/28/2025  1:24 PM    Anticipated Discharge Date:  Expected Discharge Date: 05/02/25    Disposition:    Edgar Score:  Edgar Scale Score: 18    [facility] RISK OF UNPLANNED READMISSION 2.0             44.4 Total Score        Discussed patient goal for the day, patient clinical progression, and barriers to discharge.  The following Goal(s) of the Day/Commitment(s) have been identified:   ENT consult, check nephrology plan, dialysis       Reina Abdi RN  May 3, 2025

## 2025-05-03 NOTE — FLOWSHEET NOTE
05/03/25 1119   Vital Signs   BP (!) 153/78   Temp 98 °F (36.7 °C)   Pulse 99   Respirations 16   Weight - Scale 79.9 kg (176 lb 2.4 oz)   Percent Weight Change 28.25   Pain Assessment   Pain Assessment None - Denies Pain   Post-Hemodialysis Assessment   Post-Treatment Procedures Blood returned;Access bleeding time < 10 minutes   Machine Disinfection Process Exterior Machine Disinfection   Blood Volume Processed (Liters) 69.5 L   Dialyzer Clearance Lightly streaked   Duration of Treatment (minutes) 210 minutes   Hemodialysis Intake (ml) 300 ml   Hemodialysis Output (ml) 1800 ml   NET Removed (ml) 1500   Tolerated Treatment Good   Bilateral Breath Sounds Diminished   Edema Right upper extremity;Left upper extremity;Right lower extremity;Left lower extremity   RUE Edema +1   LUE Edema +1   RLE Edema +1   LLE Edema +1   Time Off 1104   Patient Disposition Return to room   Observations & Evaluations   Level of Consciousness 0   Heart Rhythm Regular   Respiratory Quality/Effort Unlabored   O2 Device Nasal cannula   Skin Color Pink   Skin Condition/Temp Dry;Warm   Abdomen Inspection Soft   Bowel Sounds (All Quadrants) Active

## 2025-05-03 NOTE — PROGRESS NOTES
ENDOCRINOLOGY PROGRESS NOTE    Date of Service: 5/3/2025  Date of Admission: 4/28/2025  Admitting Physician: Sudheer Liao MD   Primary Care Physician: Rogers Rodríguez MD  Consultant physician: Brennan Dyer MD     Reason for the consultation:  Uncontrolled DM     History of Present Illness:  The history is provided by the patient. Accuracy of the patient data is excellent.      Subjective:  The patient was seen this morning, no acute events overnight, glucose level in range most of the time.  No hypoglycemia  Inpatient diet:   Carb Restricted diet     Point of care glucose monitoring (Independently reviewed)   Recent Labs     05/01/25  0708 05/01/25  1126 05/01/25  1648 05/01/25  2058 05/02/25  0606 05/02/25  1244 05/02/25  1611 05/02/25 2026   POCGLU 100* 155* 199* 135* 155* 237* 182* 98       Scheduled Meds:   sevelamer  800 mg Oral TID WC    polyethylene glycol  17 g Oral Daily    insulin lispro  3 Units SubCUTAneous TID WC    insulin glargine  5 Units SubCUTAneous Nightly    amLODIPine  10 mg Oral Daily    aspirin  81 mg Oral Daily    carvedilol  25 mg Oral Dinner    ferrous sulfate  325 mg Oral Every Other Day    gabapentin  100 mg Oral BID    multivitamin  1 tablet Oral Daily    nicotine  1 patch TransDERmal Q24H    pantoprazole  40 mg Oral QAM AC    insulin lispro  0-4 Units SubCUTAneous 4x Daily AC & HS     PRN Meds:   diatrizoate meglumine-sodium, 120 mL, ONCE PRN  sodium chloride, 1 spray, PRN  sodium chloride, , PRN  white petrolatum, , BID PRN  albuterol, 5 mg, 4x Daily PRN  diphenhydrAMINE, 25 mg, Q6H PRN  HYDROcodone-acetaminophen, 2 tablet, Q6H PRN  magnesium hydroxide, 30 mL, Daily PRN  tiZANidine, 4 mg, Daily PRN  glucose, 4 tablet, PRN  dextrose bolus, 125 mL, PRN   Or  dextrose bolus, 250 mL, PRN  glucagon (rDNA), 1 mg, PRN  dextrose, , Continuous PRN  morphine, 2 mg, Q4H PRN  sodium chloride, , PRN      Continuous Infusions:   sodium chloride      dextrose      sodium chloride         Review

## 2025-05-03 NOTE — PLAN OF CARE
Problem: ABCDS Injury Assessment  Goal: Absence of physical injury  5/3/2025 0957 by Nichelle Hernandez RN  Outcome: Progressing  5/2/2025 2241 by Willow Osborne RN  Outcome: Progressing     Problem: Skin/Tissue Integrity  Goal: Skin integrity remains intact  Description: 1.  Monitor for areas of redness and/or skin breakdown2.  Assess vascular access sites hourly3.  Every 4-6 hours minimum:  Change oxygen saturation probe site4.  Every 4-6 hours:  If on nasal continuous positive airway pressure, respiratory therapy assess nares and determine need for appliance change or resting period  5/3/2025 0957 by Nichelle Hernandez RN  Outcome: Progressing  5/2/2025 2241 by Willow Obsorne RN  Outcome: Progressing     Problem: Discharge Planning  Goal: Discharge to home or other facility with appropriate resources  5/3/2025 0957 by Nichelle Hernandez RN  Outcome: Progressing  5/2/2025 2241 by Willow Osborne RN  Outcome: Progressing     Problem: Chronic Conditions and Co-morbidities  Goal: Patient's chronic conditions and co-morbidity symptoms are monitored and maintained or improved  5/3/2025 0957 by Nichelle Hernandez RN  Outcome: Progressing  5/2/2025 2241 by Willow Osborne RN  Outcome: Progressing     Problem: Pain  Goal: Verbalizes/displays adequate comfort level or baseline comfort level  5/3/2025 0957 by Nichelle Hernandez RN  Outcome: Progressing  5/2/2025 2241 by Willow Osborne RN  Outcome: Progressing     Problem: Safety - Adult  Goal: Free from fall injury  5/3/2025 0957 by Nichelle Hernandez RN  Outcome: Progressing  5/2/2025 2241 by Willow Osborne RN  Outcome: Progressing

## 2025-05-03 NOTE — PROGRESS NOTES
Nephrology Progress Note  [facility]      CC:   esrd    HPI:      4/29:  Michelle Nettles is a 39 y.o. female with a past medical history of anemia of stage V CKD, recent left trimalleolar fracture, ESRD, poorly controlled IDDM, chronic HFpEF, & hidradenitis suppurativa who presented to the ED from [facility] for fatigue with low Hgb.  She has had recurrent admissions for the same thing.her hemoglobin here on admission was 5.3. she has received 2 units of prbc. Repeat hg is 7.6. she is now being seen on hd. She feels better after the blood transfusion. She has no cp, sob, headache, cough now. No diarrhea, melena, brbpr. She is tolerating hd. She is volume overloaded and her goal is 2.7 L.other labs show na 133, k 5.3>6.3, co2 21, bun 76, cr 11.1, ca 8.5, alb 3.7, wbc 5.8, plt 203. Vitals show temp 98.5, rr 18, hr 103, bp 121/69.  She is  S/p repeat EGD showing gastritis & Schatzki's ring but no source of bleeding during admission in march 2025. C scope was nonrevealing.  She has had internal hemorrhoids back in July 2024.     Interval History:    5/3/25: Pt awake alert and seen in dialysis this AM. 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 She states despite the completion she continues to have ongoing drainage from the ear    PMH:    Past Medical History:   Diagnosis Date    JOLLY (acute kidney injury) 04/05/2016    Anemia     AVF (arteriovenous fistula) 02/19/2018    let arm    Cellulitis, face     CHF (congestive heart failure) (HCC)     Chronic kidney disease     Chronic respiratory failure with hypoxia (HCC)     Dialysis AV fistula malfunction, initial encounter 11/07/2019    Displaced trimalleolar fracture of left lower leg, closed, initial encounter 02/22/2025    DM type 1 (diabetes mellitus, type 1)

## 2025-05-04 VITALS
TEMPERATURE: 98.2 F | HEIGHT: 59 IN | BODY MASS INDEX: 35.52 KG/M2 | DIASTOLIC BLOOD PRESSURE: 88 MMHG | SYSTOLIC BLOOD PRESSURE: 116 MMHG | HEART RATE: 98 BPM | RESPIRATION RATE: 18 BRPM | WEIGHT: 176.2 LBS | OXYGEN SATURATION: 96 %

## 2025-05-04 LAB
ALBUMIN SERPL-MCNC: 3.3 G/DL (ref 3.5–5.2)
ALP SERPL-CCNC: 91 U/L (ref 35–104)
ALT SERPL-CCNC: 9 U/L (ref 0–32)
ANION GAP SERPL CALCULATED.3IONS-SCNC: 13 MMOL/L (ref 7–16)
AST SERPL-CCNC: 11 U/L (ref 0–31)
BILIRUB SERPL-MCNC: 0.2 MG/DL (ref 0–1.2)
BUN SERPL-MCNC: 20 MG/DL (ref 6–20)
CALCIUM SERPL-MCNC: 9.2 MG/DL (ref 8.6–10.2)
CHLORIDE SERPL-SCNC: 102 MMOL/L (ref 98–107)
CO2 SERPL-SCNC: 25 MMOL/L (ref 22–29)
CREAT SERPL-MCNC: 5.3 MG/DL (ref 0.5–1)
ERYTHROCYTE [DISTWIDTH] IN BLOOD BY AUTOMATED COUNT: 15.2 % (ref 11.5–15)
GFR, ESTIMATED: 10 ML/MIN/1.73M2
GLUCOSE BLD-MCNC: 105 MG/DL (ref 74–99)
GLUCOSE BLD-MCNC: 277 MG/DL (ref 74–99)
GLUCOSE SERPL-MCNC: 85 MG/DL (ref 74–99)
HCT VFR BLD AUTO: 22.9 % (ref 34–48)
HGB BLD-MCNC: 7.2 G/DL (ref 11.5–15.5)
MAGNESIUM SERPL-MCNC: 2.2 MG/DL (ref 1.6–2.6)
MCH RBC QN AUTO: 29 PG (ref 26–35)
MCHC RBC AUTO-ENTMCNC: 31.4 G/DL (ref 32–34.5)
MCV RBC AUTO: 92.3 FL (ref 80–99.9)
PHOSPHATE SERPL-MCNC: 4.8 MG/DL (ref 2.5–4.5)
PLATELET # BLD AUTO: 213 K/UL (ref 130–450)
PMV BLD AUTO: 9.5 FL (ref 7–12)
POTASSIUM SERPL-SCNC: 4.9 MMOL/L (ref 3.5–5)
PROT SERPL-MCNC: 6.8 G/DL (ref 6.4–8.3)
RBC # BLD AUTO: 2.48 M/UL (ref 3.5–5.5)
SODIUM SERPL-SCNC: 140 MMOL/L (ref 132–146)
WBC OTHER # BLD: 4.4 K/UL (ref 4.5–11.5)

## 2025-05-04 PROCEDURE — 83735 ASSAY OF MAGNESIUM: CPT

## 2025-05-04 PROCEDURE — 84100 ASSAY OF PHOSPHORUS: CPT

## 2025-05-04 PROCEDURE — 6370000000 HC RX 637 (ALT 250 FOR IP)

## 2025-05-04 PROCEDURE — 85027 COMPLETE CBC AUTOMATED: CPT

## 2025-05-04 PROCEDURE — 99222 1ST HOSP IP/OBS MODERATE 55: CPT | Performed by: OTOLARYNGOLOGY

## 2025-05-04 PROCEDURE — 99239 HOSP IP/OBS DSCHRG MGMT >30: CPT | Performed by: STUDENT IN AN ORGANIZED HEALTH CARE EDUCATION/TRAINING PROGRAM

## 2025-05-04 PROCEDURE — 6370000000 HC RX 637 (ALT 250 FOR IP): Performed by: INTERNAL MEDICINE

## 2025-05-04 PROCEDURE — 36415 COLL VENOUS BLD VENIPUNCTURE: CPT

## 2025-05-04 PROCEDURE — 82962 GLUCOSE BLOOD TEST: CPT

## 2025-05-04 PROCEDURE — 2700000000 HC OXYGEN THERAPY PER DAY

## 2025-05-04 PROCEDURE — 6360000002 HC RX W HCPCS: Performed by: INTERNAL MEDICINE

## 2025-05-04 PROCEDURE — 80053 COMPREHEN METABOLIC PANEL: CPT

## 2025-05-04 RX ORDER — HYDROCODONE BITARTRATE AND ACETAMINOPHEN 5; 325 MG/1; MG/1
2 TABLET ORAL EVERY 6 HOURS PRN
Qty: 12 TABLET | Refills: 0 | Status: SHIPPED | OUTPATIENT
Start: 2025-05-04 | End: 2025-05-04

## 2025-05-04 RX ORDER — HYDROCODONE BITARTRATE AND ACETAMINOPHEN 5; 325 MG/1; MG/1
2 TABLET ORAL EVERY 6 HOURS PRN
Qty: 12 TABLET | Refills: 0 | Status: SHIPPED | OUTPATIENT
Start: 2025-05-04 | End: 2025-05-07

## 2025-05-04 RX ORDER — OFLOXACIN 3 MG/ML
5 SOLUTION AURICULAR (OTIC) 2 TIMES DAILY
Qty: 10 ML | Refills: 0 | Status: SHIPPED | OUTPATIENT
Start: 2025-05-04 | End: 2025-05-24

## 2025-05-04 RX ORDER — OFLOXACIN 3 MG/ML
5 SOLUTION AURICULAR (OTIC) 2 TIMES DAILY
Status: DISCONTINUED | OUTPATIENT
Start: 2025-05-04 | End: 2025-05-04 | Stop reason: HOSPADM

## 2025-05-04 RX ADMIN — SEVELAMER CARBONATE 800 MG: 800 TABLET, FILM COATED ORAL at 09:00

## 2025-05-04 RX ADMIN — GABAPENTIN 100 MG: 100 CAPSULE ORAL at 10:07

## 2025-05-04 RX ADMIN — MULTIVITAMIN TABLET 1 TABLET: TABLET at 10:07

## 2025-05-04 RX ADMIN — OFLOXACIN 5 DROP: 3 SOLUTION AURICULAR (OTIC) at 11:32

## 2025-05-04 RX ADMIN — INSULIN LISPRO 2 UNITS: 100 INJECTION, SOLUTION INTRAVENOUS; SUBCUTANEOUS at 11:26

## 2025-05-04 RX ADMIN — ASPIRIN 81 MG: 81 TABLET, CHEWABLE ORAL at 10:07

## 2025-05-04 RX ADMIN — AMLODIPINE BESYLATE 10 MG: 10 TABLET ORAL at 10:07

## 2025-05-04 RX ADMIN — HYDROCODONE BITARTRATE AND ACETAMINOPHEN 2 TABLET: 5; 325 TABLET ORAL at 10:05

## 2025-05-04 RX ADMIN — PANTOPRAZOLE SODIUM 40 MG: 40 TABLET, DELAYED RELEASE ORAL at 06:30

## 2025-05-04 RX ADMIN — SEVELAMER CARBONATE 800 MG: 800 TABLET, FILM COATED ORAL at 11:32

## 2025-05-04 RX ADMIN — INSULIN LISPRO 3 UNITS: 100 INJECTION, SOLUTION INTRAVENOUS; SUBCUTANEOUS at 11:27

## 2025-05-04 RX ADMIN — INSULIN LISPRO 3 UNITS: 100 INJECTION, SOLUTION INTRAVENOUS; SUBCUTANEOUS at 10:08

## 2025-05-04 RX ADMIN — MORPHINE SULFATE 2 MG: 2 INJECTION, SOLUTION INTRAMUSCULAR; INTRAVENOUS at 06:35

## 2025-05-04 ASSESSMENT — PAIN SCALES - GENERAL
PAINLEVEL_OUTOF10: 0
PAINLEVEL_OUTOF10: 6
PAINLEVEL_OUTOF10: 8

## 2025-05-04 ASSESSMENT — PAIN DESCRIPTION - DESCRIPTORS
DESCRIPTORS: ACHING
DESCRIPTORS: THROBBING

## 2025-05-04 ASSESSMENT — PAIN DESCRIPTION - LOCATION
LOCATION: ANKLE
LOCATION: ANKLE

## 2025-05-04 ASSESSMENT — PAIN - FUNCTIONAL ASSESSMENT: PAIN_FUNCTIONAL_ASSESSMENT: PREVENTS OR INTERFERES SOME ACTIVE ACTIVITIES AND ADLS

## 2025-05-04 ASSESSMENT — PAIN DESCRIPTION - ORIENTATION
ORIENTATION: LEFT
ORIENTATION: LEFT

## 2025-05-04 NOTE — PROGRESS NOTES
4 Eyes Skin Assessment     NAME:  Michelle Nettles  YOB: 1985  MEDICAL RECORD NUMBER:  [id number]    The patient is being assessed for  Transfer to New Unit    I agree that at least one RN has performed a thorough Head to Toe Skin Assessment on the patient. ALL assessment sites listed below have been assessed.      Areas assessed by both nurses:    Head, Face, Ears, Shoulders, Back, Chest, Arms, Elbows, Hands, Sacrum. Buttock, Coccyx, Ischium, Legs. Feet and Heels, Under Medical Devices , and Other          Does the Patient have a Wound? No noted wound(s)       Edgar Prevention initiated by RN: No  Wound Care Orders initiated by RN: No    Pressure Injury (Stage 3,4, Unstageable, DTI, NWPT, and Complex wounds) if present, place Wound referral order by RN under : No    New Ostomies, if present place, Ostomy referral order under : No     Nurse 1 eSignature: Electronically signed by Karen Wolff on 5/4/25 at 1:59 AM EDT    **SHARE this note so that the co-signing nurse can place an eSignature**    Nurse 2 eSignature: Electronically signed by Do Xavire RN on 5/4/25 at 2:00 AM EDT

## 2025-05-04 NOTE — DISCHARGE SUMMARY
50u     Insulin Pen Needle 32G X 4 MM Misc  1 each by Does not apply route 4 times daily     magnesium hydroxide 400 MG/5ML suspension  Commonly known as: MILK OF MAGNESIA     Nephro-Bao 0.8 MG Tabs     nicotine 21 MG/24HR  Commonly known as: NICODERM CQ     omeprazole 40 MG delayed release capsule  Commonly known as: PRILOSEC     Omnipod 5 [id number] Pods Gen 5 Misc  CHANGE pod every THREE DAYS as directed.     ondansetron 4 MG tablet  Commonly known as: ZOFRAN     OXYGEN     Pulse Oximeter For Finger Misc  Check HR and pulse ox daily and PRN symptoms     tiZANidine 4 MG tablet  Commonly known as: ZANAFLEX  Take 1 tablet by mouth daily as needed (before PT OT evaluation for pain relief)     vitamin B-12 500 MCG tablet  Commonly known as: CYANOCOBALAMIN  Take 1 tablet by mouth daily           * This list has 6 medication(s) that are the same as other medications prescribed for you. Read the directions carefully, and ask your doctor or other care provider to review them with you.                STOP taking these medications      pantoprazole 40 MG tablet  Commonly known as: PROTONIX               Where to Get Your Medications        These medications were sent to [id number] - [place], [place] - [address] - P [phone] - F [phone]  [facility] [facility] [address]      Phone: [phone]   ofloxacin 0.3 % otic solution           Note that more than 30 minutes was spent in preparing discharge papers, discussing discharge with patient, medication review, etc.    Signed:  Electronically signed by Rachael Salcido MD on 5/4/2025 at 12:59 PM

## 2025-05-04 NOTE — PROGRESS NOTES
Nephrology Progress Note  [facility]      CC:   esrd    HPI:      4/29:  Michelle Nettles is a 39 y.o. female with a past medical history of anemia of stage V CKD, recent left trimalleolar fracture, ESRD, poorly controlled IDDM, chronic HFpEF, & hidradenitis suppurativa who presented to the ED from [facility] for fatigue with low Hgb.  She has had recurrent admissions for the same thing.her hemoglobin here on admission was 5.3. she has received 2 units of prbc. Repeat hg is 7.6. she is now being seen on hd. She feels better after the blood transfusion. She has no cp, sob, headache, cough now. No diarrhea, melena, brbpr. She is tolerating hd. She is volume overloaded and her goal is 2.7 L.other labs show na 133, k 5.3>6.3, co2 21, bun 76, cr 11.1, ca 8.5, alb 3.7, wbc 5.8, plt 203. Vitals show temp 98.5, rr 18, hr 103, bp 121/69.  She is  S/p repeat EGD showing gastritis & Schatzki's ring but no source of bleeding during admission in march 2025. C scope was nonrevealing.  She has had internal hemorrhoids back in July 2024.     Interval History:    5/4/2025: Patient seen and examined. She reports feeling okay today. She reports left ankle pain that is well controlled with pain medicine. She reports that her breathing is okay.     PMH:    Past Medical History:   Diagnosis Date    JOLLY (acute kidney injury) 04/05/2016    Anemia     AVF (arteriovenous fistula) 02/19/2018    let arm    Cellulitis, face     CHF (congestive heart failure) ([facility])     Chronic kidney disease     Chronic respiratory failure with hypoxia ([facility])     Dialysis AV fistula malfunction, initial encounter 11/07/2019    Displaced trimalleolar fracture of left lower leg, closed, initial encounter 02/22/2025    DM type 1 (diabetes mellitus, type 1) ([facility])     Encounter regarding vascular access for dialysis for ESRD ([facility]) 07/12/2018    ESRD (end stage renal disease) ([facility])     Hemodialysis patient     amrita devries

## 2025-05-04 NOTE — PLAN OF CARE
Problem: ABCDS Injury Assessment  Goal: Absence of physical injury  5/3/2025 2323 by Karen Wolff  Outcome: Progressing  5/3/2025 0957 by Nichelle Hernandez RN  Outcome: Progressing     Problem: Skin/Tissue Integrity  Goal: Skin integrity remains intact  Description: 1.  Monitor for areas of redness and/or skin breakdown2.  Assess vascular access sites hourly3.  Every 4-6 hours minimum:  Change oxygen saturation probe site4.  Every 4-6 hours:  If on nasal continuous positive airway pressure, respiratory therapy assess nares and determine need for appliance change or resting period  5/3/2025 2323 by Karen Wolff  Outcome: Progressing  5/3/2025 0957 by Nichelle Hernandez RN  Outcome: Progressing     Problem: Discharge Planning  Goal: Discharge to home or other facility with appropriate resources  5/3/2025 2323 by Karen Wolff  Outcome: Progressing  5/3/2025 0957 by Nichelle Hernandez RN  Outcome: Progressing     Problem: Chronic Conditions and Co-morbidities  Goal: Patient's chronic conditions and co-morbidity symptoms are monitored and maintained or improved  5/3/2025 2323 by Karen Wolff  Outcome: Progressing  5/3/2025 0957 by Nichelle Hernandez RN  Outcome: Progressing     Problem: Pain  Goal: Verbalizes/displays adequate comfort level or baseline comfort level  5/3/2025 2323 by Karen Wolff  Outcome: Progressing  5/3/2025 0957 by Nichelle Hernandez RN  Outcome: Progressing     Problem: Safety - Adult  Goal: Free from fall injury  5/3/2025 2323 by Karen Wolff  Outcome: Progressing  5/3/2025 0957 by Nichelle Hernandez RN  Outcome: Progressing

## 2025-05-05 ENCOUNTER — TELEPHONE (OUTPATIENT)
Dept: VASCULAR SURGERY | Age: 40
End: 2025-05-05

## 2025-05-05 ENCOUNTER — OFFICE VISIT (OUTPATIENT)
Dept: VASCULAR SURGERY | Age: 40
End: 2025-05-05
Payer: MEDICARE

## 2025-05-05 DIAGNOSIS — Z99.2 ENCOUNTER REGARDING VASCULAR ACCESS FOR DIALYSIS FOR END-STAGE RENAL DISEASE (HCC): Primary | ICD-10-CM

## 2025-05-05 DIAGNOSIS — N18.6 ENCOUNTER REGARDING VASCULAR ACCESS FOR DIALYSIS FOR END-STAGE RENAL DISEASE (HCC): Primary | ICD-10-CM

## 2025-05-05 LAB
ALBUMIN SERPL-MCNC: 3.1 G/DL (ref 3.5–4.7)
ALPHA1 GLOB SERPL ELPH-MCNC: 0.3 G/DL (ref 0.2–0.4)
ALPHA2 GLOB SERPL ELPH-MCNC: 0.9 G/DL (ref 0.5–1)
B-GLOBULIN SERPL ELPH-MCNC: 0.8 G/DL (ref 0.8–1.3)
GAMMA GLOB SERPL ELPH-MCNC: 2.1 G/DL (ref 0.7–1.6)
PATHOLOGIST: ABNORMAL
PROT PATTERN SERPL ELPH-IMP: ABNORMAL
PROT SERPL-MCNC: 7.2 G/DL (ref 6.4–8.3)

## 2025-05-05 PROCEDURE — G8417 CALC BMI ABV UP PARAM F/U: HCPCS | Performed by: SURGERY

## 2025-05-05 PROCEDURE — 1111F DSCHRG MED/CURRENT MED MERGE: CPT | Performed by: SURGERY

## 2025-05-05 PROCEDURE — G8427 DOCREV CUR MEDS BY ELIG CLIN: HCPCS | Performed by: SURGERY

## 2025-05-05 PROCEDURE — 4004F PT TOBACCO SCREEN RCVD TLK: CPT | Performed by: SURGERY

## 2025-05-05 PROCEDURE — 99213 OFFICE O/P EST LOW 20 MIN: CPT | Performed by: SURGERY

## 2025-05-05 NOTE — TELEPHONE ENCOUNTER
Scheduled (L) arm fistulogram 5/23/25 at 9:30 a.m.  Message left on [facility] voicemail regarding the same.

## 2025-05-05 NOTE — TELEPHONE ENCOUNTER
Arabella from [facility] returned my call and was notified of 5/15 fistulogram.  Instructed to have the pt report to [address] registration at 8:00 a.m.

## 2025-05-05 NOTE — PROGRESS NOTES
Vascular Surgery Outpatient Followup    PCP : May Waters DO  Nephrologist : Dr. Gerard  Dialysis Center : [facility]      Previous Vascular Access Procedures  8/23/16 R IJ Tunn HD cath   1/27/17 Removal R IJ, insertion right femoral temp   1/31/17  R IJ Tunn HD cath   2/17/17  L BB AVF first stage   7/17/18 L BB AVF second stage, transposition   6/13/19 Left upper extremity fistulagram  Plasty of proximal basilic vein with 7x40 [place] Line   11/7/19 Left upper extremity fistulagram  Plasty of proximal basilic vein with 9x40 [place] Line   3696-1209 Multiple procedures at [facility] - every 3-6 months per pt report   5/4/23 L UE fistulagram  Plasty of L brachiocephalic vein with 9x40 mustang, 10x40 DCB   1/2025 - 4/2025 Monthly procedures at [facility]     HISTORY OF PRESENT ILLNESS:    The patient is a 39 y.o. female who is here in regards to follow up of their previously placed left brachiocephalic AVF.  The pt has been to the access center monthly for the past 4 months for intervention.  She has been having some left arm swelling as well.  She was referred back to us for further investigation of her access due to the frequent need for procedures at the access center.      Past Medical History:        Diagnosis Date    JOLLY (acute kidney injury) 04/05/2016    Anemia     AVF (arteriovenous fistula) 02/19/2018    let arm    Cellulitis, face     CHF (congestive heart failure) ([facility])     Chronic kidney disease     Chronic respiratory failure with hypoxia ([facility])     Dialysis AV fistula malfunction, initial encounter 11/07/2019    Displaced trimalleolar fracture of left lower leg, closed, initial encounter 02/22/2025    DM type 1 (diabetes mellitus, type 1) ([facility])     Encounter regarding vascular access for dialysis for ESRD ([facility]) 07/12/2018    ESRD (end stage renal disease) ([facility])     Hemodialysis patient     amrita dawson mon wed fri / graft in left arm    Hidradenitis suppurativa     Hypercalcemia     Hyperkalemia

## 2025-05-06 ENCOUNTER — OFFICE VISIT (OUTPATIENT)
Dept: ENDOCRINOLOGY | Age: 40
End: 2025-05-06
Payer: MEDICARE

## 2025-05-06 ENCOUNTER — OFFICE VISIT (OUTPATIENT)
Dept: ORTHOPEDIC SURGERY | Age: 40
End: 2025-05-06

## 2025-05-06 ENCOUNTER — TELEPHONE (OUTPATIENT)
Dept: CARDIOLOGY CLINIC | Age: 40
End: 2025-05-06

## 2025-05-06 VITALS
SYSTOLIC BLOOD PRESSURE: 109 MMHG | OXYGEN SATURATION: 96 % | TEMPERATURE: 98.5 F | DIASTOLIC BLOOD PRESSURE: 70 MMHG | HEART RATE: 73 BPM | BODY MASS INDEX: 35.59 KG/M2 | HEIGHT: 59 IN

## 2025-05-06 VITALS — DIASTOLIC BLOOD PRESSURE: 58 MMHG | SYSTOLIC BLOOD PRESSURE: 102 MMHG | TEMPERATURE: 98.2 F | HEART RATE: 100 BPM

## 2025-05-06 DIAGNOSIS — E55.9 VITAMIN D DEFICIENCY: ICD-10-CM

## 2025-05-06 DIAGNOSIS — N18.6 TYPE 1 DIABETES MELLITUS WITH CHRONIC KIDNEY DISEASE ON CHRONIC DIALYSIS (HCC): ICD-10-CM

## 2025-05-06 DIAGNOSIS — E10.22 TYPE 1 DIABETES MELLITUS WITH CHRONIC KIDNEY DISEASE ON CHRONIC DIALYSIS (HCC): ICD-10-CM

## 2025-05-06 DIAGNOSIS — E10.65 TYPE 1 DIABETES MELLITUS WITH HYPERGLYCEMIA (HCC): Primary | ICD-10-CM

## 2025-05-06 DIAGNOSIS — Z99.2 TYPE 1 DIABETES MELLITUS WITH CHRONIC KIDNEY DISEASE ON CHRONIC DIALYSIS (HCC): ICD-10-CM

## 2025-05-06 DIAGNOSIS — S82.852A TRIMALLEOLAR FRACTURE OF ANKLE, CLOSED, LEFT, INITIAL ENCOUNTER: Primary | ICD-10-CM

## 2025-05-06 PROCEDURE — G8417 CALC BMI ABV UP PARAM F/U: HCPCS | Performed by: FAMILY MEDICINE

## 2025-05-06 PROCEDURE — 99024 POSTOP FOLLOW-UP VISIT: CPT | Performed by: PHYSICIAN ASSISTANT

## 2025-05-06 PROCEDURE — G8427 DOCREV CUR MEDS BY ELIG CLIN: HCPCS | Performed by: FAMILY MEDICINE

## 2025-05-06 PROCEDURE — 2022F DILAT RTA XM EVC RTNOPTHY: CPT | Performed by: FAMILY MEDICINE

## 2025-05-06 PROCEDURE — 4004F PT TOBACCO SCREEN RCVD TLK: CPT | Performed by: FAMILY MEDICINE

## 2025-05-06 PROCEDURE — 1111F DSCHRG MED/CURRENT MED MERGE: CPT | Performed by: FAMILY MEDICINE

## 2025-05-06 PROCEDURE — 99214 OFFICE O/P EST MOD 30 MIN: CPT | Performed by: FAMILY MEDICINE

## 2025-05-06 PROCEDURE — 3044F HG A1C LEVEL LT 7.0%: CPT | Performed by: FAMILY MEDICINE

## 2025-05-06 RX ORDER — ACYCLOVIR 400 MG/1
TABLET ORAL
Qty: 9 EACH | Refills: 3 | Status: SHIPPED | OUTPATIENT
Start: 2025-05-06

## 2025-05-06 NOTE — PROGRESS NOTES
LEFT ARM FISTULAGRAM, BALLOON ANGIOPLASTY performed by Haylee Zaidi MD at [facility] OR    DILATION AND CURETTAGE OF UTERUS  2009    FRACTURE SURGERY  October 11,2012    fracture right ulnar, left tibia, and pelvis    INVASIVE VASCULAR N/A 12/19/2023    Fistulogram left performed by Haylee Zaidi MD at [facility]    INVASIVE VASCULAR Left 12/19/2023    Angioplasty fistula/dialysis circuit performed by Haylee Zaidi MD at [facility]    LEG SURGERY Left 2/15/2025    Application of left ankle spanning external fixator performed by Peyman Germain MD at [facility]    OTHER SURGICAL HISTORY  1017/12    open reduction internal fixation left radial shaft    OTHER SURGICAL HISTORY  08/19/2016    pericardial window    OTHER SURGICAL HISTORY  08/23/2016     r tesio insertion  / remove 8/16/2018    OTHER SURGICAL HISTORY Left 02/17/2017    insertion a-v fistula left arm    RI ARTERIOVENOUS ANASTOMOSIS OPEN DIRECT Left 7/17/2018    REVISION AV FISTULA - LEFT ARM performed by Haylee Zaidi MD at [facility] OR    RI INSJ NON-TUNNELED CENTRAL VENOUS CATH AGE 5 YR/> N/A 5/10/2018    TESIO CATHETER INSERTION performed by Cornelius Morris MD at [facility] OR    RI VITRECTOMY PARS PLANA REMOVE PRERETINAL MEMBRANE Left 8/28/2018    PARS PLANA VITRECTOMY 25 GAUGE, VITREOUS HEMORRHAGE REMOVAL, ENDO LASER, AIR/FLUID  EXCHANGE LEFT EYE performed by Pierce Fierro MD at [facility] OR    RECTAL SURGERY N/A 1/29/2021    PERINEAL ABCESS INCISION AND DRAINAGE (LITHOTOMY) performed by Dominic Brower MD at [facility]    UPPER GASTROINTESTINAL ENDOSCOPY N/A 2/1/2024    EGD ESOPHAGOGASTRODUODENOSCOPY performed by Shira Parker MD at [facility]    UPPER GASTROINTESTINAL ENDOSCOPY N/A 7/18/2024    ESOPHAGOGASTRODUODENOSCOPY BIOPSY performed by Balaji Gonzalez MD at [facility]    UPPER GASTROINTESTINAL ENDOSCOPY N/A 1/13/2025    ESOPHAGOGASTRODUODENOSCOPY BIOPSY performed by Carlos

## 2025-05-06 NOTE — PATIENT INSTRUCTIONS
WB:  Non-weight bearing on left lower extremity    Walking Boot: on for ambulation only    Therapy: Attend therapy for range of motion exercises to the left lower extremity.    ACE bandage to the left lower extremity. Ice and elevation to the left lower extremity.     DVT: Per medicine secondary to possible GI bleed and low hemoglobin  Pain control : Over the counter analgesia as needed    Continue with ice to the injured extremity 2-3 times per day for swelling  If able continue with elevation and compression    Follow up in 4 weeks with XR of the left ankle to be done in office

## 2025-05-06 NOTE — PROGRESS NOTES
Chief Complaint   Patient presents with    Post-Op Check      LLE Ex-fix Removal w/ L ankle ORIF DOS 03/07/25 TTS (*GP ends 6-5-2025).         OP:SURGEON: Dr. Peyman Germain MD  DATE OF PROCEDURE: 3/7/25  PROCEDURE:  1.  Removal of external fixator left lower extremity     2.  Open reduction internal fixation left trimalleolar ankle fracture without fixation of the posterior malleolus     3.  Stress examination of the syndesmosis under anesthesia, left ankle     4.  Open reduction internal fixation left ankle syndesmosis    POD: 8 weeks    Subjective:  Michelle Nettles is following up from the above surgery. She is NWB on left lower extremity. She ambulates with assistive device, wheelchair.  Pain to extremity is mild. They denies numbness, tingling to the left lower extremity. Denies calf pain, chest pain, or shortness of breath.  Patient is currently on aspirin 81mg daily. She recently was admitted with low hemoglobin and a possible GI bleed.       Review of Systems -  All pertinent positives/negative in HPI     Objective:    General: Alert and oriented X 3, normocephalic atraumatic, external ears and eye normal, sclera clear, no acute distress, respirations easy and unlabored with no audible wheezes, skin warm and dry, speech and dress appropriate for noted age, affect euthymic.    Extremity:  Left Lower Extremity  Skin clean dry and intact, without signs of infection   Incision healing well with no erythema or signs of infection  moderate edema noted  Compartments supple throughout thigh and leg  Calf supple and not tender  negative Homans  Demonstrates active knee flexion and extension and ankle DF/PF with stiffness.   States sensation intact to touch in sural, deep peroneal, superficial peroneal, saphenous, posterior tibial  nerve distributions to foot/ankle.  Palpable dorsalis pedis and posterior tibialis pulses, cap refill brisk in toes, foot warm/perfused.    BP (!) 102/58   Pulse 100   Temp

## 2025-05-06 NOTE — TELEPHONE ENCOUNTER
Donna from [facility] is calling to schedule pt for a Hosp follow up with Dr Whittington.  She can be reached back at [phone]

## 2025-05-09 ENCOUNTER — PATIENT MESSAGE (OUTPATIENT)
Dept: ENDOCRINOLOGY | Age: 40
End: 2025-05-09

## 2025-05-09 DIAGNOSIS — E10.65 TYPE 1 DIABETES MELLITUS WITH HYPERGLYCEMIA (HCC): Primary | ICD-10-CM

## 2025-05-12 RX ORDER — ACYCLOVIR 400 MG/1
TABLET ORAL
Qty: 9 EACH | Refills: 3 | Status: ON HOLD | OUTPATIENT
Start: 2025-05-12

## 2025-05-12 RX ORDER — ACYCLOVIR 400 MG/1
TABLET ORAL
Qty: 1 EACH | Refills: 0 | Status: ON HOLD | OUTPATIENT
Start: 2025-05-12

## 2025-05-14 ENCOUNTER — TELEPHONE (OUTPATIENT)
Dept: VASCULAR SURGERY | Age: 40
End: 2025-05-14

## 2025-05-14 ENCOUNTER — APPOINTMENT (OUTPATIENT)
Dept: GENERAL RADIOLOGY | Age: 40
DRG: 480 | End: 2025-05-14
Payer: MEDICARE

## 2025-05-14 ENCOUNTER — HOSPITAL ENCOUNTER (INPATIENT)
Age: 40
LOS: 4 days | Discharge: HOME | DRG: 480 | End: 2025-05-19
Attending: EMERGENCY MEDICINE | Admitting: STUDENT IN AN ORGANIZED HEALTH CARE EDUCATION/TRAINING PROGRAM
Payer: MEDICARE

## 2025-05-14 DIAGNOSIS — D64.9 ACUTE ON CHRONIC ANEMIA: Primary | ICD-10-CM

## 2025-05-14 DIAGNOSIS — R55 SYNCOPE AND COLLAPSE: ICD-10-CM

## 2025-05-14 DIAGNOSIS — S72.432A: ICD-10-CM

## 2025-05-14 DIAGNOSIS — N18.6 END STAGE RENAL DISEASE (HCC): ICD-10-CM

## 2025-05-14 DIAGNOSIS — M25.562 ACUTE PAIN OF LEFT KNEE: ICD-10-CM

## 2025-05-14 DIAGNOSIS — S72.415A CLOSED NONDISPLACED FRACTURE OF CONDYLE OF LEFT FEMUR, INITIAL ENCOUNTER (HCC): ICD-10-CM

## 2025-05-14 LAB
ALBUMIN SERPL-MCNC: 3.3 G/DL (ref 3.5–5.2)
ALP SERPL-CCNC: 107 U/L (ref 35–104)
ALT SERPL-CCNC: 9 U/L (ref 0–35)
ANION GAP SERPL CALCULATED.3IONS-SCNC: 16 MMOL/L (ref 7–16)
AST SERPL-CCNC: 30 U/L (ref 0–35)
BASOPHILS # BLD: 0.03 K/UL (ref 0–0.2)
BASOPHILS NFR BLD: 1 % (ref 0–2)
BILIRUB SERPL-MCNC: 0.3 MG/DL (ref 0–1.2)
BUN SERPL-MCNC: 23 MG/DL (ref 6–20)
CALCIUM SERPL-MCNC: 8.8 MG/DL (ref 8.6–10)
CHLORIDE SERPL-SCNC: 93 MMOL/L (ref 98–107)
CO2 SERPL-SCNC: 29 MMOL/L (ref 22–29)
CREAT SERPL-MCNC: 7 MG/DL (ref 0.5–1)
EOSINOPHIL # BLD: 0.35 K/UL (ref 0.05–0.5)
EOSINOPHILS RELATIVE PERCENT: 6 % (ref 0–6)
ERYTHROCYTE [DISTWIDTH] IN BLOOD BY AUTOMATED COUNT: 14.5 % (ref 11.5–15)
GFR, ESTIMATED: 7 ML/MIN/1.73M2
GLUCOSE SERPL-MCNC: 122 MG/DL (ref 74–99)
HCT VFR BLD AUTO: 18.8 % (ref 34–48)
HGB BLD-MCNC: 6.4 G/DL (ref 11.5–15.5)
IMM GRANULOCYTES # BLD AUTO: <0.03 K/UL (ref 0–0.58)
IMM GRANULOCYTES NFR BLD: 0 % (ref 0–5)
LYMPHOCYTES NFR BLD: 1.04 K/UL (ref 1.5–4)
LYMPHOCYTES RELATIVE PERCENT: 18 % (ref 20–42)
MCH RBC QN AUTO: 29.6 PG (ref 26–35)
MCHC RBC AUTO-ENTMCNC: 34 G/DL (ref 32–34.5)
MCV RBC AUTO: 87 FL (ref 80–99.9)
MONOCYTES NFR BLD: 0.73 K/UL (ref 0.1–0.95)
MONOCYTES NFR BLD: 13 % (ref 2–12)
NEUTROPHILS NFR BLD: 62 % (ref 43–80)
NEUTS SEG NFR BLD: 3.6 K/UL (ref 1.8–7.3)
PLATELET # BLD AUTO: 233 K/UL (ref 130–450)
PMV BLD AUTO: 9.5 FL (ref 7–12)
POTASSIUM SERPL-SCNC: 4.4 MMOL/L (ref 3.5–5.1)
PROT SERPL-MCNC: 7.3 G/DL (ref 6.4–8.3)
RBC # BLD AUTO: 2.16 M/UL (ref 3.5–5.5)
SODIUM SERPL-SCNC: 138 MMOL/L (ref 136–145)
WBC OTHER # BLD: 5.8 K/UL (ref 4.5–11.5)

## 2025-05-14 PROCEDURE — P9016 RBC LEUKOCYTES REDUCED: HCPCS

## 2025-05-14 PROCEDURE — 99285 EMERGENCY DEPT VISIT HI MDM: CPT

## 2025-05-14 PROCEDURE — 30233N1 TRANSFUSION OF NONAUTOLOGOUS RED BLOOD CELLS INTO PERIPHERAL VEIN, PERCUTANEOUS APPROACH: ICD-10-PCS | Performed by: INTERNAL MEDICINE

## 2025-05-14 PROCEDURE — 6370000000 HC RX 637 (ALT 250 FOR IP): Performed by: EMERGENCY MEDICINE

## 2025-05-14 PROCEDURE — 85025 COMPLETE CBC W/AUTO DIFF WBC: CPT

## 2025-05-14 PROCEDURE — 36430 TRANSFUSION BLD/BLD COMPNT: CPT

## 2025-05-14 PROCEDURE — 73562 X-RAY EXAM OF KNEE 3: CPT

## 2025-05-14 PROCEDURE — 86901 BLOOD TYPING SEROLOGIC RH(D): CPT

## 2025-05-14 PROCEDURE — 86923 COMPATIBILITY TEST ELECTRIC: CPT

## 2025-05-14 PROCEDURE — 80053 COMPREHEN METABOLIC PANEL: CPT

## 2025-05-14 PROCEDURE — 86850 RBC ANTIBODY SCREEN: CPT

## 2025-05-14 PROCEDURE — 36415 COLL VENOUS BLD VENIPUNCTURE: CPT

## 2025-05-14 PROCEDURE — 86900 BLOOD TYPING SEROLOGIC ABO: CPT

## 2025-05-14 RX ORDER — OXYCODONE AND ACETAMINOPHEN 5; 325 MG/1; MG/1
1 TABLET ORAL ONCE
Refills: 0 | Status: COMPLETED | OUTPATIENT
Start: 2025-05-14 | End: 2025-05-14

## 2025-05-14 RX ORDER — SODIUM CHLORIDE 9 MG/ML
INJECTION, SOLUTION INTRAVENOUS PRN
Status: COMPLETED | OUTPATIENT
Start: 2025-05-14 | End: 2025-05-16

## 2025-05-14 RX ADMIN — OXYCODONE AND ACETAMINOPHEN 1 TABLET: 5; 325 TABLET ORAL at 22:18

## 2025-05-14 ASSESSMENT — PAIN DESCRIPTION - LOCATION
LOCATION: KNEE;ANKLE
LOCATION: KNEE;ANKLE

## 2025-05-14 ASSESSMENT — PAIN DESCRIPTION - ORIENTATION
ORIENTATION: LEFT
ORIENTATION: LEFT

## 2025-05-14 ASSESSMENT — PAIN - FUNCTIONAL ASSESSMENT: PAIN_FUNCTIONAL_ASSESSMENT: 0-10

## 2025-05-14 ASSESSMENT — PAIN DESCRIPTION - DESCRIPTORS: DESCRIPTORS: ACHING

## 2025-05-14 ASSESSMENT — PAIN SCALES - GENERAL: PAINLEVEL_OUTOF10: 8

## 2025-05-14 NOTE — TELEPHONE ENCOUNTER
Pt was DC'd from [facility] on 5/10/25; she was notified of tomorrow's fistulogram.

## 2025-05-14 NOTE — ED PROVIDER NOTES
HPI:  5/14/25, Time: 7:56 PM EDT         Michelle ORDONEZ Lencho Nettles is a 39 y.o. female history of JOLLY history of anemia history of AV fistula history of CHF chronic kidney disease dialysis history of hypercalcemia hyperkalemia hypertension iron deficiency anemia gastritis tobacco use diabetes presenting to the ED for anemia, beginning 1 day ago.  The complaint has been persistent, moderate in severity, and worsened by nothing.  Patient was sent here from home.  Patient had dialysis yesterday had blood work done hemoglobin was reportedly low was told to come to the emergency room.  Patient reporting no chest pain no difficulty breathing she reports no abdominal pain she reports her stools are dark from being on iron tablets she reports no new change in her stool.  Patient reporting no hematemesis reports no fever chills she reports no cough.  Patient reports she was just discharged from nursing home on the weekend.  Patient reporting knee pain.  Patient reporting that she had fallen on Tuesday reports not sure how she had fallen she believes she may have passed out.      ROS:   Pertinent positives and negatives are stated within HPI, all other systems reviewed and are negative.  --------------------------------------------- PAST HISTORY ---------------------------------------------  Past Medical History:  has a past medical history of JOLLY (acute kidney injury), Anemia, AVF (arteriovenous fistula), Cellulitis, face, CHF (congestive heart failure) ([facility]), Chronic kidney disease, Chronic respiratory failure with hypoxia ([facility]), Dialysis AV fistula malfunction, initial encounter, Displaced trimalleolar fracture of left lower leg, closed, initial encounter, DM type 1 (diabetes mellitus, type 1) ([facility]), Encounter regarding vascular access for dialysis for ESRD ([facility]), ESRD (end stage renal disease) ([facility]), Hemodialysis patient, Hidradenitis suppurativa, Hypercalcemia, Hyperkalemia, Hypertension, Iron deficiency anemia secondary

## 2025-05-14 NOTE — TELEPHONE ENCOUNTER
Patient called to notify office that she is being sent to ER ([address]) for hemoglobin of 6.7.   She is scheduled for fistulogram tomorrow.

## 2025-05-15 ENCOUNTER — APPOINTMENT (OUTPATIENT)
Dept: CT IMAGING | Age: 40
DRG: 480 | End: 2025-05-15
Attending: EMERGENCY MEDICINE
Payer: MEDICARE

## 2025-05-15 ENCOUNTER — APPOINTMENT (OUTPATIENT)
Dept: GENERAL RADIOLOGY | Age: 40
DRG: 480 | End: 2025-05-15
Payer: MEDICARE

## 2025-05-15 PROBLEM — R26.2 UNABLE TO AMBULATE: Status: ACTIVE | Noted: 2025-05-15

## 2025-05-15 LAB
ABO/RH: NORMAL
ALBUMIN SERPL-MCNC: 3.4 G/DL (ref 3.5–5.2)
ALP SERPL-CCNC: 111 U/L (ref 35–104)
ALT SERPL-CCNC: 8 U/L (ref 0–35)
ANION GAP SERPL CALCULATED.3IONS-SCNC: 25 MMOL/L (ref 7–16)
ANTIBODY SCREEN: NEGATIVE
ARM BAND NUMBER: NORMAL
AST SERPL-CCNC: 12 U/L (ref 0–35)
BASOPHILS # BLD: 0.02 K/UL (ref 0–0.2)
BASOPHILS NFR BLD: 0 % (ref 0–2)
BILIRUB SERPL-MCNC: 0.3 MG/DL (ref 0–1.2)
BLOOD BANK BLOOD PRODUCT EXPIRATION DATE: NORMAL
BLOOD BANK DISPENSE STATUS: NORMAL
BLOOD BANK ISBT PRODUCT BLOOD TYPE: 5100
BLOOD BANK PRODUCT CODE: NORMAL
BLOOD BANK SAMPLE EXPIRATION: NORMAL
BLOOD BANK UNIT TYPE AND RH: NORMAL
BPU ID: NORMAL
BUN SERPL-MCNC: 29 MG/DL (ref 6–20)
CALCIUM SERPL-MCNC: 8.5 MG/DL (ref 8.6–10)
CHLORIDE SERPL-SCNC: 87 MMOL/L (ref 98–107)
CO2 SERPL-SCNC: 20 MMOL/L (ref 22–29)
COMPONENT: NORMAL
CREAT SERPL-MCNC: 8.5 MG/DL (ref 0.5–1)
CROSSMATCH RESULT: NORMAL
EKG ATRIAL RATE: 105 BPM
EKG P AXIS: 73 DEGREES
EKG P-R INTERVAL: 130 MS
EKG Q-T INTERVAL: 340 MS
EKG QRS DURATION: 80 MS
EKG QTC CALCULATION (BAZETT): 449 MS
EKG R AXIS: 52 DEGREES
EKG T AXIS: 89 DEGREES
EKG VENTRICULAR RATE: 105 BPM
EOSINOPHIL # BLD: 0.24 K/UL (ref 0.05–0.5)
EOSINOPHILS RELATIVE PERCENT: 5 % (ref 0–6)
ERYTHROCYTE [DISTWIDTH] IN BLOOD BY AUTOMATED COUNT: 14.6 % (ref 11.5–15)
GFR, ESTIMATED: 6 ML/MIN/1.73M2
GLUCOSE BLD-MCNC: 115 MG/DL (ref 74–99)
GLUCOSE BLD-MCNC: 134 MG/DL (ref 74–99)
GLUCOSE BLD-MCNC: 250 MG/DL (ref 74–99)
GLUCOSE BLD-MCNC: 70 MG/DL (ref 74–99)
GLUCOSE BLD-MCNC: >500 MG/DL (ref 74–99)
GLUCOSE BLD-MCNC: >500 MG/DL (ref 74–99)
GLUCOSE SERPL-MCNC: 618 MG/DL (ref 74–99)
HCT VFR BLD AUTO: 23.2 % (ref 34–48)
HGB BLD-MCNC: 7.6 G/DL (ref 11.5–15.5)
IMM GRANULOCYTES # BLD AUTO: <0.03 K/UL (ref 0–0.58)
IMM GRANULOCYTES NFR BLD: 0 % (ref 0–5)
INR PPP: 1.1
LYMPHOCYTES NFR BLD: 0.69 K/UL (ref 1.5–4)
LYMPHOCYTES RELATIVE PERCENT: 14 % (ref 20–42)
MCH RBC QN AUTO: 29.1 PG (ref 26–35)
MCHC RBC AUTO-ENTMCNC: 32.8 G/DL (ref 32–34.5)
MCV RBC AUTO: 88.9 FL (ref 80–99.9)
MONOCYTES NFR BLD: 0.36 K/UL (ref 0.1–0.95)
MONOCYTES NFR BLD: 7 % (ref 2–12)
NEUTROPHILS NFR BLD: 74 % (ref 43–80)
NEUTS SEG NFR BLD: 3.65 K/UL (ref 1.8–7.3)
PLATELET # BLD AUTO: 208 K/UL (ref 130–450)
PMV BLD AUTO: 9.4 FL (ref 7–12)
POTASSIUM SERPL-SCNC: 4.8 MMOL/L (ref 3.5–5.1)
PROT SERPL-MCNC: 7.3 G/DL (ref 6.4–8.3)
PROTHROMBIN TIME: 11.7 SEC (ref 9.3–12.4)
RBC # BLD AUTO: 2.61 M/UL (ref 3.5–5.5)
SODIUM SERPL-SCNC: 132 MMOL/L (ref 136–145)
TRANSFUSION STATUS: NORMAL
TROPONIN I SERPL HS-MCNC: 180 NG/L (ref 0–14)
UNIT DIVISION: 0
UNIT ISSUE DATE/TIME: NORMAL
WBC OTHER # BLD: 5 K/UL (ref 4.5–11.5)

## 2025-05-15 PROCEDURE — 99223 1ST HOSP IP/OBS HIGH 75: CPT | Performed by: ORTHOPAEDIC SURGERY

## 2025-05-15 PROCEDURE — 2500000003 HC RX 250 WO HCPCS: Performed by: PHYSICIAN ASSISTANT

## 2025-05-15 PROCEDURE — 6360000002 HC RX W HCPCS: Performed by: STUDENT IN AN ORGANIZED HEALTH CARE EDUCATION/TRAINING PROGRAM

## 2025-05-15 PROCEDURE — 99222 1ST HOSP IP/OBS MODERATE 55: CPT | Performed by: STUDENT IN AN ORGANIZED HEALTH CARE EDUCATION/TRAINING PROGRAM

## 2025-05-15 PROCEDURE — 80053 COMPREHEN METABOLIC PANEL: CPT

## 2025-05-15 PROCEDURE — 2060000000 HC ICU INTERMEDIATE R&B

## 2025-05-15 PROCEDURE — 6370000000 HC RX 637 (ALT 250 FOR IP)

## 2025-05-15 PROCEDURE — 85025 COMPLETE CBC W/AUTO DIFF WBC: CPT

## 2025-05-15 PROCEDURE — 71045 X-RAY EXAM CHEST 1 VIEW: CPT

## 2025-05-15 PROCEDURE — 2700000000 HC OXYGEN THERAPY PER DAY

## 2025-05-15 PROCEDURE — 2500000003 HC RX 250 WO HCPCS: Performed by: STUDENT IN AN ORGANIZED HEALTH CARE EDUCATION/TRAINING PROGRAM

## 2025-05-15 PROCEDURE — 5A1D70Z PERFORMANCE OF URINARY FILTRATION, INTERMITTENT, LESS THAN 6 HOURS PER DAY: ICD-10-PCS | Performed by: INTERNAL MEDICINE

## 2025-05-15 PROCEDURE — 90935 HEMODIALYSIS ONE EVALUATION: CPT

## 2025-05-15 PROCEDURE — 73610 X-RAY EXAM OF ANKLE: CPT

## 2025-05-15 PROCEDURE — 6360000002 HC RX W HCPCS: Performed by: EMERGENCY MEDICINE

## 2025-05-15 PROCEDURE — 93005 ELECTROCARDIOGRAM TRACING: CPT | Performed by: EMERGENCY MEDICINE

## 2025-05-15 PROCEDURE — 73700 CT LOWER EXTREMITY W/O DYE: CPT

## 2025-05-15 PROCEDURE — 6370000000 HC RX 637 (ALT 250 FOR IP): Performed by: STUDENT IN AN ORGANIZED HEALTH CARE EDUCATION/TRAINING PROGRAM

## 2025-05-15 PROCEDURE — 96374 THER/PROPH/DIAG INJ IV PUSH: CPT

## 2025-05-15 PROCEDURE — 84484 ASSAY OF TROPONIN QUANT: CPT

## 2025-05-15 PROCEDURE — 93010 ELECTROCARDIOGRAM REPORT: CPT | Performed by: INTERNAL MEDICINE

## 2025-05-15 PROCEDURE — 82962 GLUCOSE BLOOD TEST: CPT

## 2025-05-15 PROCEDURE — 85610 PROTHROMBIN TIME: CPT

## 2025-05-15 RX ORDER — BISACODYL 10 MG
10 SUPPOSITORY, RECTAL RECTAL DAILY PRN
Status: DISCONTINUED | OUTPATIENT
Start: 2025-05-15 | End: 2025-05-19 | Stop reason: HOSPADM

## 2025-05-15 RX ORDER — GLUCAGON 1 MG/ML
1 KIT INJECTION PRN
Status: CANCELLED | OUTPATIENT
Start: 2025-05-15

## 2025-05-15 RX ORDER — ASPIRIN 81 MG/1
81 TABLET, CHEWABLE ORAL DAILY
Status: DISCONTINUED | OUTPATIENT
Start: 2025-05-15 | End: 2025-05-19 | Stop reason: HOSPADM

## 2025-05-15 RX ORDER — GLUCAGON 1 MG/ML
1 KIT INJECTION PRN
Status: DISCONTINUED | OUTPATIENT
Start: 2025-05-15 | End: 2025-05-19 | Stop reason: HOSPADM

## 2025-05-15 RX ORDER — MAGNESIUM HYDROXIDE/ALUMINUM HYDROXICE/SIMETHICONE 120; 1200; 1200 MG/30ML; MG/30ML; MG/30ML
30 SUSPENSION ORAL EVERY 6 HOURS PRN
Status: DISCONTINUED | OUTPATIENT
Start: 2025-05-15 | End: 2025-05-19 | Stop reason: HOSPADM

## 2025-05-15 RX ORDER — SODIUM CHLORIDE 0.9 % (FLUSH) 0.9 %
5-40 SYRINGE (ML) INJECTION EVERY 12 HOURS SCHEDULED
Status: DISCONTINUED | OUTPATIENT
Start: 2025-05-15 | End: 2025-05-19 | Stop reason: HOSPADM

## 2025-05-15 RX ORDER — SODIUM CHLORIDE 0.9 % (FLUSH) 0.9 %
5-40 SYRINGE (ML) INJECTION PRN
Status: DISCONTINUED | OUTPATIENT
Start: 2025-05-15 | End: 2025-05-19 | Stop reason: HOSPADM

## 2025-05-15 RX ORDER — INSULIN GLARGINE 100 [IU]/ML
20 INJECTION, SOLUTION SUBCUTANEOUS ONCE
Status: COMPLETED | OUTPATIENT
Start: 2025-05-15 | End: 2025-05-15

## 2025-05-15 RX ORDER — PANTOPRAZOLE SODIUM 40 MG/1
40 TABLET, DELAYED RELEASE ORAL
Status: DISCONTINUED | OUTPATIENT
Start: 2025-05-16 | End: 2025-05-19 | Stop reason: HOSPADM

## 2025-05-15 RX ORDER — INSULIN LISPRO 100 [IU]/ML
0-8 INJECTION, SOLUTION INTRAVENOUS; SUBCUTANEOUS
Status: DISCONTINUED | OUTPATIENT
Start: 2025-05-15 | End: 2025-05-18

## 2025-05-15 RX ORDER — SENNA AND DOCUSATE SODIUM 50; 8.6 MG/1; MG/1
1 TABLET, FILM COATED ORAL 2 TIMES DAILY
Status: DISCONTINUED | OUTPATIENT
Start: 2025-05-15 | End: 2025-05-19 | Stop reason: HOSPADM

## 2025-05-15 RX ORDER — SODIUM CHLORIDE 0.9 % (FLUSH) 0.9 %
5-40 SYRINGE (ML) INJECTION PRN
Status: DISCONTINUED | OUTPATIENT
Start: 2025-05-15 | End: 2025-05-16 | Stop reason: HOSPADM

## 2025-05-15 RX ORDER — ACETAMINOPHEN 650 MG/1
650 SUPPOSITORY RECTAL EVERY 6 HOURS PRN
Status: DISCONTINUED | OUTPATIENT
Start: 2025-05-15 | End: 2025-05-19 | Stop reason: HOSPADM

## 2025-05-15 RX ORDER — SODIUM CHLORIDE 0.9 % (FLUSH) 0.9 %
5-40 SYRINGE (ML) INJECTION EVERY 12 HOURS SCHEDULED
Status: DISCONTINUED | OUTPATIENT
Start: 2025-05-15 | End: 2025-05-16 | Stop reason: HOSPADM

## 2025-05-15 RX ORDER — SODIUM CHLORIDE 9 MG/ML
INJECTION, SOLUTION INTRAVENOUS PRN
Status: DISCONTINUED | OUTPATIENT
Start: 2025-05-15 | End: 2025-05-16 | Stop reason: HOSPADM

## 2025-05-15 RX ORDER — ALBUTEROL SULFATE 0.83 MG/ML
2.5 SOLUTION RESPIRATORY (INHALATION) EVERY 4 HOURS PRN
Status: DISCONTINUED | OUTPATIENT
Start: 2025-05-15 | End: 2025-05-19 | Stop reason: HOSPADM

## 2025-05-15 RX ORDER — CARVEDILOL 25 MG/1
25 TABLET ORAL
Status: DISCONTINUED | OUTPATIENT
Start: 2025-05-15 | End: 2025-05-19 | Stop reason: HOSPADM

## 2025-05-15 RX ORDER — HEPARIN SODIUM 5000 [USP'U]/ML
5000 INJECTION, SOLUTION INTRAVENOUS; SUBCUTANEOUS EVERY 8 HOURS SCHEDULED
Status: DISCONTINUED | OUTPATIENT
Start: 2025-05-15 | End: 2025-05-19 | Stop reason: HOSPADM

## 2025-05-15 RX ORDER — OXYCODONE AND ACETAMINOPHEN 5; 325 MG/1; MG/1
1 TABLET ORAL EVERY 4 HOURS PRN
Refills: 0 | Status: DISCONTINUED | OUTPATIENT
Start: 2025-05-15 | End: 2025-05-18

## 2025-05-15 RX ORDER — SODIUM CHLORIDE 9 MG/ML
INJECTION, SOLUTION INTRAVENOUS PRN
Status: DISCONTINUED | OUTPATIENT
Start: 2025-05-15 | End: 2025-05-19 | Stop reason: HOSPADM

## 2025-05-15 RX ORDER — INSULIN GLARGINE 100 [IU]/ML
INJECTION, SOLUTION SUBCUTANEOUS NIGHTLY
Status: CANCELLED | OUTPATIENT
Start: 2025-05-15

## 2025-05-15 RX ORDER — INSULIN LISPRO 100 [IU]/ML
20 INJECTION, SOLUTION INTRAVENOUS; SUBCUTANEOUS ONCE
Status: COMPLETED | OUTPATIENT
Start: 2025-05-15 | End: 2025-05-15

## 2025-05-15 RX ORDER — SUCRALFATE 1 G/1
1 TABLET ORAL
Status: DISCONTINUED | OUTPATIENT
Start: 2025-05-15 | End: 2025-05-19 | Stop reason: HOSPADM

## 2025-05-15 RX ORDER — ONDANSETRON 4 MG/1
4 TABLET, ORALLY DISINTEGRATING ORAL EVERY 8 HOURS PRN
Status: DISCONTINUED | OUTPATIENT
Start: 2025-05-15 | End: 2025-05-19 | Stop reason: HOSPADM

## 2025-05-15 RX ORDER — DEXTROSE MONOHYDRATE 100 MG/ML
INJECTION, SOLUTION INTRAVENOUS CONTINUOUS PRN
Status: CANCELLED | OUTPATIENT
Start: 2025-05-15

## 2025-05-15 RX ORDER — AMLODIPINE BESYLATE 10 MG/1
10 TABLET ORAL DAILY
Status: DISCONTINUED | OUTPATIENT
Start: 2025-05-15 | End: 2025-05-19 | Stop reason: HOSPADM

## 2025-05-15 RX ORDER — ACETAMINOPHEN 325 MG/1
650 TABLET ORAL EVERY 6 HOURS PRN
Status: DISCONTINUED | OUTPATIENT
Start: 2025-05-15 | End: 2025-05-19 | Stop reason: HOSPADM

## 2025-05-15 RX ORDER — ONDANSETRON 2 MG/ML
4 INJECTION INTRAMUSCULAR; INTRAVENOUS EVERY 6 HOURS PRN
Status: DISCONTINUED | OUTPATIENT
Start: 2025-05-15 | End: 2025-05-19 | Stop reason: HOSPADM

## 2025-05-15 RX ORDER — DEXTROSE MONOHYDRATE 100 MG/ML
INJECTION, SOLUTION INTRAVENOUS CONTINUOUS PRN
Status: DISCONTINUED | OUTPATIENT
Start: 2025-05-15 | End: 2025-05-19 | Stop reason: HOSPADM

## 2025-05-15 RX ORDER — INSULIN GLARGINE 100 [IU]/ML
10 INJECTION, SOLUTION SUBCUTANEOUS NIGHTLY
Status: DISCONTINUED | OUTPATIENT
Start: 2025-05-15 | End: 2025-05-19 | Stop reason: HOSPADM

## 2025-05-15 RX ORDER — FERROUS SULFATE 325(65) MG
325 TABLET ORAL DAILY
Status: DISCONTINUED | OUTPATIENT
Start: 2025-05-15 | End: 2025-05-19 | Stop reason: HOSPADM

## 2025-05-15 RX ORDER — GABAPENTIN 100 MG/1
100 CAPSULE ORAL 2 TIMES DAILY
Status: DISCONTINUED | OUTPATIENT
Start: 2025-05-15 | End: 2025-05-19 | Stop reason: HOSPADM

## 2025-05-15 RX ORDER — FENTANYL CITRATE 50 UG/ML
25 INJECTION, SOLUTION INTRAMUSCULAR; INTRAVENOUS ONCE
Refills: 0 | Status: COMPLETED | OUTPATIENT
Start: 2025-05-15 | End: 2025-05-15

## 2025-05-15 RX ORDER — POLYETHYLENE GLYCOL 3350 17 G/17G
17 POWDER, FOR SOLUTION ORAL DAILY
Status: DISCONTINUED | OUTPATIENT
Start: 2025-05-15 | End: 2025-05-19 | Stop reason: HOSPADM

## 2025-05-15 RX ADMIN — SUCRALFATE 1 G: 1 TABLET ORAL at 20:59

## 2025-05-15 RX ADMIN — HYDROMORPHONE HYDROCHLORIDE 0.25 MG: 1 INJECTION, SOLUTION INTRAMUSCULAR; INTRAVENOUS; SUBCUTANEOUS at 11:41

## 2025-05-15 RX ADMIN — CARVEDILOL 25 MG: 25 TABLET, FILM COATED ORAL at 17:58

## 2025-05-15 RX ADMIN — Medication 16 G: at 20:58

## 2025-05-15 RX ADMIN — INSULIN LISPRO 20 UNITS: 100 INJECTION, SOLUTION INTRAVENOUS; SUBCUTANEOUS at 14:25

## 2025-05-15 RX ADMIN — FENTANYL CITRATE 25 MCG: 50 INJECTION INTRAMUSCULAR; INTRAVENOUS at 01:18

## 2025-05-15 RX ADMIN — GABAPENTIN 100 MG: 100 CAPSULE ORAL at 17:58

## 2025-05-15 RX ADMIN — SODIUM CHLORIDE, PRESERVATIVE FREE 10 ML: 5 INJECTION INTRAVENOUS at 21:01

## 2025-05-15 RX ADMIN — OXYCODONE AND ACETAMINOPHEN 1 TABLET: 5; 325 TABLET ORAL at 20:58

## 2025-05-15 RX ADMIN — SUCRALFATE 1 G: 1 TABLET ORAL at 17:58

## 2025-05-15 RX ADMIN — HYDROMORPHONE HYDROCHLORIDE 0.25 MG: 1 INJECTION, SOLUTION INTRAMUSCULAR; INTRAVENOUS; SUBCUTANEOUS at 18:06

## 2025-05-15 RX ADMIN — INSULIN GLARGINE 20 UNITS: 100 INJECTION, SOLUTION SUBCUTANEOUS at 13:29

## 2025-05-15 ASSESSMENT — PAIN SCALES - GENERAL
PAINLEVEL_OUTOF10: 5
PAINLEVEL_OUTOF10: 7
PAINLEVEL_OUTOF10: 7
PAINLEVEL_OUTOF10: 8
PAINLEVEL_OUTOF10: 0

## 2025-05-15 ASSESSMENT — PAIN - FUNCTIONAL ASSESSMENT
PAIN_FUNCTIONAL_ASSESSMENT: ACTIVITIES ARE NOT PREVENTED
PAIN_FUNCTIONAL_ASSESSMENT: PREVENTS OR INTERFERES SOME ACTIVE ACTIVITIES AND ADLS
PAIN_FUNCTIONAL_ASSESSMENT: PREVENTS OR INTERFERES WITH MANY ACTIVE NOT PASSIVE ACTIVITIES

## 2025-05-15 ASSESSMENT — PAIN DESCRIPTION - DESCRIPTORS
DESCRIPTORS: STABBING;SORE;PRESSURE
DESCRIPTORS: ACHING;TENDER;SORE
DESCRIPTORS: ACHING;DISCOMFORT;SORE

## 2025-05-15 ASSESSMENT — PAIN DESCRIPTION - FREQUENCY
FREQUENCY: CONTINUOUS
FREQUENCY: CONTINUOUS

## 2025-05-15 ASSESSMENT — PAIN DESCRIPTION - ONSET
ONSET: ON-GOING
ONSET: ON-GOING

## 2025-05-15 ASSESSMENT — PAIN DESCRIPTION - PAIN TYPE
TYPE: ACUTE PAIN
TYPE: ACUTE PAIN

## 2025-05-15 ASSESSMENT — PAIN DESCRIPTION - LOCATION
LOCATION: LEG;KNEE
LOCATION: LEG
LOCATION: LEG;KNEE
LOCATION: LEG

## 2025-05-15 ASSESSMENT — PAIN DESCRIPTION - ORIENTATION
ORIENTATION: LEFT

## 2025-05-15 NOTE — CONSENT
Informed Consent for Blood Component Transfusion Note    I have discussed with the patient the rationale for blood component transfusion; its benefits in treating or preventing fatigue, organ damage, or death; and its risk which includes mild transfusion reactions, rare risk of blood borne infection, or more serious but rare reactions. I have discussed the alternatives to transfusion, including the risk and consequences of not receiving transfusion. The patient had an opportunity to ask questions and had agreed to proceed with transfusion of blood components.    Electronically signed by Blas Enriquez MD on 5/15/25 at 9:11 AM EDT

## 2025-05-15 NOTE — FLOWSHEET NOTE
05/15/25 1627   Treatment   Time On 1227   Time Off 1627   Treatment Goal 2000   Observations & Evaluations   Level of Consciousness 0   Oriented X 3   Heart Rhythm Regular   Respiratory Quality/Effort Unlabored   O2 Device Nasal cannula   Bilateral Breath Sounds Diminished   Skin Color Other (comment)  (normal for ethnicity)   Skin Condition/Temp Dry;Warm   Appetite Good   Bowel Sounds (All Quadrants) Active   Edema Generalized   Edema Generalized +2   Vital Signs   /65   Temp 97.3 °F (36.3 °C)   Pulse (!) 106   Respirations 17   Weight - Scale   (unable to obtain- ed cot)   Pain Assessment   Pain Assessment None - Denies Pain   Pain Level 0   Post-Hemodialysis Assessment   Post-Treatment Procedures Blood returned;Access bleeding time < 10 minutes   Machine Disinfection Process Acid/Vinegar Clean;Heat Disinfect;Exterior Machine Disinfection   Rinseback Volume (ml) 300 ml   Blood Volume Processed (Liters) 85.3 L   Dialyzer Clearance Clear   Duration of Treatment (minutes) 240 minutes   Hemodialysis Intake (ml) 300 ml   Hemodialysis Output (ml) 2500 ml   NET Removed (ml) 2200   Tolerated Treatment Good   Patient Disposition Return to room   Handoff   Communication Given Transfer Handoff     HD treatment completed, tolerated procedure well. UF 2200 mL NET. Report given to Mercy RODRIGUEZ

## 2025-05-15 NOTE — DISCHARGE INSTRUCTIONS
Be compliant with medication  Monitor blood pressure outpatient.   Holding coreg and amlodipine, continue midodrine.   Follow up with PCP and Dr. Germain.         [facility]  [address] [address]    Dr. Jorge Moore, DO         MD Dr. Jorge Mensah MD Frank Ansevin, PA-C   LUKE oNguera PA-JOSÉ LUIS      Orthopaedics Discharge Instructions   Weight bearing Status - Non-weight bearing - on left lower Extremity  Pain Medication   Contact Office for Medication Refill- [phone]  Office can refill pain medications no sooner than every 7 days  If patient discharging to facility then pain control will be continued per facility physician  Ice to operative/injured site for 15-30 minutes of each hour for next 5 days    Recommend that you continue to ice the area 2-3 times per day after this   Elevate operative/injured limb on 2 pillows at home  Goal is to have limb above the heart if able  Continue DVT Prophylaxis (blood clot prevention) as prescribed: Aspirin 81 twice daily  Wound care -keep dressings clean and dry  Fracture Care -remain nonweightbearing to your left lower extremity    Follow up in office in approximately 2 weeks. Your first follow up appointment is often with one of our physician assistants.     Call the office at [phone] if having any concerns.     Watch for these significant complications.  Call physician if they or any other problems occur:  Fever over 101°, redness, swelling or warmth at the operative site  Unrelieved nausea    Foul smelling or cloudy drainage at the operative site   Unrelieved pain    Blood soaked dressing. (Some oozing may be normal)     Numb, pale, blue, cold or tingling extremity          It is the Department of Orthopaedic Trauma's standard of practice that providers will de-escalate (wean) all patients from narcotic (opioid) medications during the post-operative period.   We

## 2025-05-15 NOTE — CARE COORDINATION
05/15/25 Case Management note: Chart reviewed in ER as patient is a return to the emergency room within 30 days of discharge to the LTC facility of [facility] where a bed is held for patient. No precert is needed to return. Electronically signed by Clair Cardoza RN CM on 5/15/2025 at 7:14 AM

## 2025-05-15 NOTE — H&P
History & Physical      PCP: Rogers Rodríguez MD    Date of Admission: 5/14/2025    Chief Complaint:  had concerns including HGB 6.6 (Dialysis yesterday, nurse called today to come in for hemoglobin 6.6. Dialysis Tuesday Thursday saturday).      ASSESSMENT:    Michelle Nettles is a 39 y.o. female patient of Rogers Rodríguez MD with history of chronic anemia, ESRD on hemodialysis, uncontrolled type 1 insulin-dependent diabetes mellitus, hypertension, presented to [facility] on 5/14/2025  after she was advised to come to the ER by dialysis center after her hemoglobin was found to be low.  While in the ER patient hemoglobin found to be 6.6 and patient was transfused unit of PRBC.  Patient has needed multiple transfusions in the past, has had colonoscopy as well as EGD with no acute abnormality.  Patient had bleeding scan on 3/17 which was negative  Patient while in the ER also complained of having a fall and left knee pain.  Patient had CT left knee that showed acute traumatic nondisplaced obliquely oriented intra-articular fracture involving the medial femoral condyle.  Orthopedic surgery is consulted patient is admitted for further management    Left medial femoral condyle fracture  Plan for operative intervention for definitive fixation on 5/15  Keep patient n.p.o.  Symptomatic management for pain  PT/OT once cleared by orthopedic surgery    Acute on chronic anemia  Patient has had EGD/colonoscopy/, negative Meckel scan.  Patient was supposed to follow-up with GI as outpatient for.  Endoscopy  Patient was transfused 1 unit of PRBC while in the ER.  Monitor H&H  Continue outpatient follow-up with GI.    Uncontrolled type 1 insulin-dependent diabetes mellitus:  Sliding scale insulin.    Hypertension  Continue home blood pressure medications    ESRD  Nephrology consult    Chronic hypoxic respiratory failure  On baseline 5 L oxygen.      Diet: Diet NPO  Diet NPO  Code Status: Full  DVT

## 2025-05-15 NOTE — CONSULTS
[facility]  Nephrology Consult Note    Patient's Name: Michelle Nettles    Reason for Consult:  ESRD     Chief Complaint: Anemia  History Obtained From:  patient, past medical records, and EMR    History of Present Illness:    A full consult is deferred as patient is well-known to our service.  Briefly, Michelle Nettles is a 39 y.o. female with a past medical history of CHF, anemia, ESRD, and hypertension.  She presented to the ED on 5/14 reportedly for concerns of anemia.  Vital signs on 5/14 include temperature 97.5, respirations 18, pulse 104, /71, and she was 100% SpO2.  Lab data on 5/14 includes BUN 23, creatinine 7, albumin 3.3, and hemoglobin 6.4 g/dL.  She underwent an x-ray of her left knee on 5/15 which showed cortical irregularity to the distal left femur in the trochlear groove region with possible lucency extending towards the medial aspect of the distal left femur, mild left knee osteoarthritis.  She had a chest x-ray on 5/15 which showed cardiomegaly with decreased opacification within the mid and lower lungs.  CT left knee on 5/15 showed acute traumatic nondisplaced obliquely oriented intra-articular fracture involving medial femoral condyle.  Orthopedic surgery has been consulted to see the patient and she is for possible OR.  Nephrology has been consulted to see the patient for concerns of ESRD.  Patient is known to our service and dialyzes as an outpatient at [facility] via left arm AV fistula.  At present, patient was seen and examined.  She notes that she had a fall this week.  She reports shortness of breath.    PMH:    Past Medical History:   Diagnosis Date    JOLLY (acute kidney injury) 04/05/2016    Anemia     AVF (arteriovenous fistula) 02/19/2018    let arm    Cellulitis, face     CHF (congestive heart failure) (HCC)     Chronic kidney disease     Chronic respiratory failure with hypoxia (HCC)     Dialysis AV fistula malfunction, initial encounter 
note accurate although inadvertent errors may be present.

## 2025-05-16 ENCOUNTER — ANESTHESIA (OUTPATIENT)
Dept: OPERATING ROOM | Age: 40
End: 2025-05-16
Payer: MEDICARE

## 2025-05-16 ENCOUNTER — APPOINTMENT (OUTPATIENT)
Dept: GENERAL RADIOLOGY | Age: 40
DRG: 480 | End: 2025-05-16
Payer: MEDICARE

## 2025-05-16 ENCOUNTER — ANESTHESIA EVENT (OUTPATIENT)
Dept: OPERATING ROOM | Age: 40
End: 2025-05-16
Payer: MEDICARE

## 2025-05-16 LAB
ALBUMIN SERPL-MCNC: 3.6 G/DL (ref 3.5–5.2)
ALP SERPL-CCNC: 113 U/L (ref 35–104)
ALT SERPL-CCNC: 8 U/L (ref 0–35)
ANION GAP SERPL CALCULATED.3IONS-SCNC: 16 MMOL/L (ref 7–16)
AST SERPL-CCNC: 16 U/L (ref 0–35)
BASOPHILS # BLD: 0 K/UL (ref 0–0.2)
BASOPHILS NFR BLD: 0 % (ref 0–2)
BILIRUB SERPL-MCNC: 0.3 MG/DL (ref 0–1.2)
BUN SERPL-MCNC: 14 MG/DL (ref 6–20)
CALCIUM SERPL-MCNC: 9 MG/DL (ref 8.6–10)
CHLORIDE SERPL-SCNC: 94 MMOL/L (ref 98–107)
CO2 SERPL-SCNC: 25 MMOL/L (ref 22–29)
CREAT SERPL-MCNC: 4.7 MG/DL (ref 0.5–1)
EOSINOPHIL # BLD: 0 K/UL (ref 0.05–0.5)
EOSINOPHILS RELATIVE PERCENT: 0 % (ref 0–6)
ERYTHROCYTE [DISTWIDTH] IN BLOOD BY AUTOMATED COUNT: 14.6 % (ref 11.5–15)
GFR, ESTIMATED: 11 ML/MIN/1.73M2
GLUCOSE BLD-MCNC: 177 MG/DL (ref 74–99)
GLUCOSE BLD-MCNC: 309 MG/DL (ref 74–99)
GLUCOSE BLD-MCNC: 416 MG/DL (ref 74–99)
GLUCOSE BLD-MCNC: 80 MG/DL (ref 74–99)
GLUCOSE SERPL-MCNC: 197 MG/DL (ref 74–99)
HBA1C MFR BLD: 7.1 % (ref 4–5.6)
HCT VFR BLD AUTO: 27.2 % (ref 34–48)
HGB BLD-MCNC: 8.9 G/DL (ref 11.5–15.5)
LYMPHOCYTES NFR BLD: 0.15 K/UL (ref 1.5–4)
LYMPHOCYTES RELATIVE PERCENT: 2 % (ref 20–42)
MAGNESIUM SERPL-MCNC: 2.1 MG/DL (ref 1.6–2.6)
MCH RBC QN AUTO: 28.8 PG (ref 26–35)
MCHC RBC AUTO-ENTMCNC: 32.7 G/DL (ref 32–34.5)
MCV RBC AUTO: 88 FL (ref 80–99.9)
MONOCYTES NFR BLD: 0 % (ref 2–12)
MONOCYTES NFR BLD: 0 K/UL (ref 0.1–0.95)
NEUTROPHILS NFR BLD: 98 % (ref 43–80)
NEUTS SEG NFR BLD: 8.25 K/UL (ref 1.8–7.3)
PHOSPHATE SERPL-MCNC: 4.6 MG/DL (ref 2.5–4.5)
PLATELET # BLD AUTO: 234 K/UL (ref 130–450)
PMV BLD AUTO: 9.5 FL (ref 7–12)
POTASSIUM SERPL-SCNC: 3.7 MMOL/L (ref 3.5–5.1)
PROT SERPL-MCNC: 8.1 G/DL (ref 6.4–8.3)
RBC # BLD AUTO: 3.09 M/UL (ref 3.5–5.5)
RBC # BLD: ABNORMAL 10*6/UL
SODIUM SERPL-SCNC: 135 MMOL/L (ref 136–145)
WBC OTHER # BLD: 8.4 K/UL (ref 4.5–11.5)

## 2025-05-16 PROCEDURE — 2500000003 HC RX 250 WO HCPCS: Performed by: STUDENT IN AN ORGANIZED HEALTH CARE EDUCATION/TRAINING PROGRAM

## 2025-05-16 PROCEDURE — 73560 X-RAY EXAM OF KNEE 1 OR 2: CPT

## 2025-05-16 PROCEDURE — 6360000002 HC RX W HCPCS: Performed by: ANESTHESIOLOGY

## 2025-05-16 PROCEDURE — 6360000002 HC RX W HCPCS

## 2025-05-16 PROCEDURE — 83735 ASSAY OF MAGNESIUM: CPT

## 2025-05-16 PROCEDURE — 82962 GLUCOSE BLOOD TEST: CPT

## 2025-05-16 PROCEDURE — 27514 TREATMENT OF THIGH FRACTURE: CPT | Performed by: ORTHOPAEDIC SURGERY

## 2025-05-16 PROCEDURE — 6360000002 HC RX W HCPCS: Performed by: STUDENT IN AN ORGANIZED HEALTH CARE EDUCATION/TRAINING PROGRAM

## 2025-05-16 PROCEDURE — 83036 HEMOGLOBIN GLYCOSYLATED A1C: CPT

## 2025-05-16 PROCEDURE — 3700000001 HC ADD 15 MINUTES (ANESTHESIA): Performed by: ORTHOPAEDIC SURGERY

## 2025-05-16 PROCEDURE — 7100000000 HC PACU RECOVERY - FIRST 15 MIN: Performed by: ORTHOPAEDIC SURGERY

## 2025-05-16 PROCEDURE — 6370000000 HC RX 637 (ALT 250 FOR IP): Performed by: STUDENT IN AN ORGANIZED HEALTH CARE EDUCATION/TRAINING PROGRAM

## 2025-05-16 PROCEDURE — 2700000000 HC OXYGEN THERAPY PER DAY

## 2025-05-16 PROCEDURE — 84100 ASSAY OF PHOSPHORUS: CPT

## 2025-05-16 PROCEDURE — 3600000005 HC SURGERY LEVEL 5 BASE: Performed by: ORTHOPAEDIC SURGERY

## 2025-05-16 PROCEDURE — C1769 GUIDE WIRE: HCPCS | Performed by: ORTHOPAEDIC SURGERY

## 2025-05-16 PROCEDURE — 80053 COMPREHEN METABOLIC PANEL: CPT

## 2025-05-16 PROCEDURE — 85025 COMPLETE CBC W/AUTO DIFF WBC: CPT

## 2025-05-16 PROCEDURE — 2709999900 HC NON-CHARGEABLE SUPPLY: Performed by: ORTHOPAEDIC SURGERY

## 2025-05-16 PROCEDURE — 3600000015 HC SURGERY LEVEL 5 ADDTL 15MIN: Performed by: ORTHOPAEDIC SURGERY

## 2025-05-16 PROCEDURE — 36415 COLL VENOUS BLD VENIPUNCTURE: CPT

## 2025-05-16 PROCEDURE — 7100000001 HC PACU RECOVERY - ADDTL 15 MIN: Performed by: ORTHOPAEDIC SURGERY

## 2025-05-16 PROCEDURE — 2580000003 HC RX 258: Performed by: EMERGENCY MEDICINE

## 2025-05-16 PROCEDURE — 6370000000 HC RX 637 (ALT 250 FOR IP)

## 2025-05-16 PROCEDURE — 2060000000 HC ICU INTERMEDIATE R&B

## 2025-05-16 PROCEDURE — 2500000003 HC RX 250 WO HCPCS

## 2025-05-16 PROCEDURE — C1713 ANCHOR/SCREW BN/BN,TIS/BN: HCPCS | Performed by: ORTHOPAEDIC SURGERY

## 2025-05-16 PROCEDURE — 0QSC04Z REPOSITION LEFT LOWER FEMUR WITH INTERNAL FIXATION DEVICE, OPEN APPROACH: ICD-10-PCS | Performed by: ORTHOPAEDIC SURGERY

## 2025-05-16 PROCEDURE — 3700000000 HC ANESTHESIA ATTENDED CARE: Performed by: ORTHOPAEDIC SURGERY

## 2025-05-16 DEVICE — IMPLANTABLE DEVICE: Type: IMPLANTABLE DEVICE | Site: KNEE | Status: FUNCTIONAL

## 2025-05-16 RX ORDER — HYDRALAZINE HYDROCHLORIDE 20 MG/ML
5 INJECTION INTRAMUSCULAR; INTRAVENOUS
Status: COMPLETED | OUTPATIENT
Start: 2025-05-16 | End: 2025-05-16

## 2025-05-16 RX ORDER — DEXAMETHASONE SODIUM PHOSPHATE 10 MG/ML
INJECTION, SOLUTION INTRA-ARTICULAR; INTRALESIONAL; INTRAMUSCULAR; INTRAVENOUS; SOFT TISSUE
Status: DISCONTINUED | OUTPATIENT
Start: 2025-05-16 | End: 2025-05-16 | Stop reason: SDUPTHER

## 2025-05-16 RX ORDER — SODIUM CHLORIDE 9 MG/ML
INJECTION, SOLUTION INTRAVENOUS PRN
Status: DISCONTINUED | OUTPATIENT
Start: 2025-05-16 | End: 2025-05-16 | Stop reason: HOSPADM

## 2025-05-16 RX ORDER — LABETALOL HYDROCHLORIDE 5 MG/ML
INJECTION, SOLUTION INTRAVENOUS
Status: COMPLETED
Start: 2025-05-16 | End: 2025-05-16

## 2025-05-16 RX ORDER — ONDANSETRON 2 MG/ML
INJECTION INTRAMUSCULAR; INTRAVENOUS
Status: DISCONTINUED | OUTPATIENT
Start: 2025-05-16 | End: 2025-05-16 | Stop reason: SDUPTHER

## 2025-05-16 RX ORDER — HYDROMORPHONE HYDROCHLORIDE 1 MG/ML
0.5 INJECTION, SOLUTION INTRAMUSCULAR; INTRAVENOUS; SUBCUTANEOUS EVERY 5 MIN PRN
Status: DISCONTINUED | OUTPATIENT
Start: 2025-05-16 | End: 2025-05-16 | Stop reason: HOSPADM

## 2025-05-16 RX ORDER — SODIUM CHLORIDE 0.9 % (FLUSH) 0.9 %
5-40 SYRINGE (ML) INJECTION PRN
Status: CANCELLED | OUTPATIENT
Start: 2025-05-16

## 2025-05-16 RX ORDER — ONDANSETRON 2 MG/ML
4 INJECTION INTRAMUSCULAR; INTRAVENOUS EVERY 6 HOURS PRN
Status: CANCELLED | OUTPATIENT
Start: 2025-05-16

## 2025-05-16 RX ORDER — IPRATROPIUM BROMIDE AND ALBUTEROL SULFATE 2.5; .5 MG/3ML; MG/3ML
1 SOLUTION RESPIRATORY (INHALATION)
Status: DISCONTINUED | OUTPATIENT
Start: 2025-05-16 | End: 2025-05-16 | Stop reason: HOSPADM

## 2025-05-16 RX ORDER — SODIUM CHLORIDE 0.9 % (FLUSH) 0.9 %
5-40 SYRINGE (ML) INJECTION EVERY 12 HOURS SCHEDULED
Status: CANCELLED | OUTPATIENT
Start: 2025-05-16

## 2025-05-16 RX ORDER — HYDRALAZINE HYDROCHLORIDE 20 MG/ML
INJECTION INTRAMUSCULAR; INTRAVENOUS
Status: DISPENSED
Start: 2025-05-16 | End: 2025-05-16

## 2025-05-16 RX ORDER — PROPOFOL 10 MG/ML
INJECTION, EMULSION INTRAVENOUS
Status: DISCONTINUED | OUTPATIENT
Start: 2025-05-16 | End: 2025-05-16 | Stop reason: SDUPTHER

## 2025-05-16 RX ORDER — OXYCODONE AND ACETAMINOPHEN 5; 325 MG/1; MG/1
1 TABLET ORAL EVERY 6 HOURS PRN
Qty: 28 TABLET | Refills: 0 | Status: SHIPPED | OUTPATIENT
Start: 2025-05-16 | End: 2025-05-22 | Stop reason: SDUPTHER

## 2025-05-16 RX ORDER — ACETAMINOPHEN 325 MG/1
650 TABLET ORAL
Status: DISCONTINUED | OUTPATIENT
Start: 2025-05-16 | End: 2025-05-16 | Stop reason: HOSPADM

## 2025-05-16 RX ORDER — ROCURONIUM BROMIDE 10 MG/ML
INJECTION, SOLUTION INTRAVENOUS
Status: DISCONTINUED | OUTPATIENT
Start: 2025-05-16 | End: 2025-05-16 | Stop reason: SDUPTHER

## 2025-05-16 RX ORDER — HYDROMORPHONE HYDROCHLORIDE 2 MG/ML
INJECTION, SOLUTION INTRAMUSCULAR; INTRAVENOUS; SUBCUTANEOUS
Status: DISCONTINUED | OUTPATIENT
Start: 2025-05-16 | End: 2025-05-16 | Stop reason: SDUPTHER

## 2025-05-16 RX ORDER — SODIUM CHLORIDE 0.9 % (FLUSH) 0.9 %
5-40 SYRINGE (ML) INJECTION EVERY 12 HOURS SCHEDULED
Status: DISCONTINUED | OUTPATIENT
Start: 2025-05-16 | End: 2025-05-16 | Stop reason: HOSPADM

## 2025-05-16 RX ORDER — INSULIN LISPRO 100 [IU]/ML
10 INJECTION, SOLUTION INTRAVENOUS; SUBCUTANEOUS ONCE
Status: COMPLETED | OUTPATIENT
Start: 2025-05-16 | End: 2025-05-16

## 2025-05-16 RX ORDER — PHENYLEPHRINE HCL IN 0.9% NACL 1 MG/10 ML
SYRINGE (ML) INTRAVENOUS
Status: DISCONTINUED | OUTPATIENT
Start: 2025-05-16 | End: 2025-05-16 | Stop reason: SDUPTHER

## 2025-05-16 RX ORDER — MIDAZOLAM HYDROCHLORIDE 1 MG/ML
INJECTION, SOLUTION INTRAMUSCULAR; INTRAVENOUS
Status: DISCONTINUED | OUTPATIENT
Start: 2025-05-16 | End: 2025-05-16 | Stop reason: SDUPTHER

## 2025-05-16 RX ORDER — HYDROMORPHONE HYDROCHLORIDE 1 MG/ML
0.25 INJECTION, SOLUTION INTRAMUSCULAR; INTRAVENOUS; SUBCUTANEOUS EVERY 5 MIN PRN
Status: DISCONTINUED | OUTPATIENT
Start: 2025-05-16 | End: 2025-05-16 | Stop reason: HOSPADM

## 2025-05-16 RX ORDER — ONDANSETRON 4 MG/1
4 TABLET, ORALLY DISINTEGRATING ORAL EVERY 8 HOURS PRN
Status: CANCELLED | OUTPATIENT
Start: 2025-05-16

## 2025-05-16 RX ORDER — CEFAZOLIN SODIUM 1 G/3ML
INJECTION, POWDER, FOR SOLUTION INTRAMUSCULAR; INTRAVENOUS
Status: DISCONTINUED | OUTPATIENT
Start: 2025-05-16 | End: 2025-05-16 | Stop reason: SDUPTHER

## 2025-05-16 RX ORDER — SODIUM CHLORIDE 9 MG/ML
INJECTION, SOLUTION INTRAVENOUS PRN
Status: CANCELLED | OUTPATIENT
Start: 2025-05-16

## 2025-05-16 RX ORDER — SODIUM CHLORIDE 0.9 % (FLUSH) 0.9 %
5-40 SYRINGE (ML) INJECTION PRN
Status: DISCONTINUED | OUTPATIENT
Start: 2025-05-16 | End: 2025-05-16 | Stop reason: HOSPADM

## 2025-05-16 RX ORDER — DIPHENHYDRAMINE HYDROCHLORIDE 50 MG/ML
12.5 INJECTION, SOLUTION INTRAMUSCULAR; INTRAVENOUS
Status: DISCONTINUED | OUTPATIENT
Start: 2025-05-16 | End: 2025-05-16 | Stop reason: HOSPADM

## 2025-05-16 RX ORDER — DROPERIDOL 2.5 MG/ML
0.62 INJECTION, SOLUTION INTRAMUSCULAR; INTRAVENOUS
Status: DISCONTINUED | OUTPATIENT
Start: 2025-05-16 | End: 2025-05-16 | Stop reason: HOSPADM

## 2025-05-16 RX ORDER — MIDAZOLAM HYDROCHLORIDE 2 MG/2ML
2 INJECTION, SOLUTION INTRAMUSCULAR; INTRAVENOUS
Status: DISCONTINUED | OUTPATIENT
Start: 2025-05-16 | End: 2025-05-16 | Stop reason: HOSPADM

## 2025-05-16 RX ORDER — FENTANYL CITRATE 50 UG/ML
INJECTION, SOLUTION INTRAMUSCULAR; INTRAVENOUS
Status: DISCONTINUED | OUTPATIENT
Start: 2025-05-16 | End: 2025-05-16 | Stop reason: SDUPTHER

## 2025-05-16 RX ORDER — LABETALOL HYDROCHLORIDE 5 MG/ML
5 INJECTION, SOLUTION INTRAVENOUS
Status: COMPLETED | OUTPATIENT
Start: 2025-05-16 | End: 2025-05-16

## 2025-05-16 RX ORDER — NALOXONE HYDROCHLORIDE 0.4 MG/ML
INJECTION, SOLUTION INTRAMUSCULAR; INTRAVENOUS; SUBCUTANEOUS PRN
Status: DISCONTINUED | OUTPATIENT
Start: 2025-05-16 | End: 2025-05-16 | Stop reason: HOSPADM

## 2025-05-16 RX ORDER — ONDANSETRON 2 MG/ML
4 INJECTION INTRAMUSCULAR; INTRAVENOUS
Status: DISCONTINUED | OUTPATIENT
Start: 2025-05-16 | End: 2025-05-16 | Stop reason: HOSPADM

## 2025-05-16 RX ADMIN — SODIUM CHLORIDE: 9 INJECTION, SOLUTION INTRAVENOUS at 07:28

## 2025-05-16 RX ADMIN — AMLODIPINE BESYLATE 10 MG: 10 TABLET ORAL at 10:45

## 2025-05-16 RX ADMIN — ASPIRIN 81 MG CHEWABLE TABLET 81 MG: 81 TABLET CHEWABLE at 10:45

## 2025-05-16 RX ADMIN — CARVEDILOL 25 MG: 25 TABLET, FILM COATED ORAL at 16:12

## 2025-05-16 RX ADMIN — GABAPENTIN 100 MG: 100 CAPSULE ORAL at 16:12

## 2025-05-16 RX ADMIN — CEFAZOLIN 2 G: 1 INJECTION, POWDER, FOR SOLUTION INTRAMUSCULAR; INTRAVENOUS at 08:03

## 2025-05-16 RX ADMIN — MIDAZOLAM 2 MG: 1 INJECTION INTRAMUSCULAR; INTRAVENOUS at 07:33

## 2025-05-16 RX ADMIN — ONDANSETRON HYDROCHLORIDE 4 MG: 2 SOLUTION INTRAMUSCULAR; INTRAVENOUS at 08:34

## 2025-05-16 RX ADMIN — HYDROMORPHONE HYDROCHLORIDE 0.25 MG: 1 INJECTION, SOLUTION INTRAMUSCULAR; INTRAVENOUS; SUBCUTANEOUS at 15:47

## 2025-05-16 RX ADMIN — OXYCODONE AND ACETAMINOPHEN 1 TABLET: 5; 325 TABLET ORAL at 12:40

## 2025-05-16 RX ADMIN — HYDRALAZINE HYDROCHLORIDE 5 MG: 20 INJECTION INTRAMUSCULAR; INTRAVENOUS at 10:08

## 2025-05-16 RX ADMIN — LABETALOL HYDROCHLORIDE 5 MG: 5 INJECTION INTRAVENOUS at 09:17

## 2025-05-16 RX ADMIN — FENTANYL CITRATE 50 MCG: 50 INJECTION, SOLUTION INTRAMUSCULAR; INTRAVENOUS at 07:41

## 2025-05-16 RX ADMIN — HYDROMORPHONE HYDROCHLORIDE 0.5 MG: 1 INJECTION, SOLUTION INTRAMUSCULAR; INTRAVENOUS; SUBCUTANEOUS at 09:06

## 2025-05-16 RX ADMIN — PROPOFOL 110 MG: 10 INJECTION, EMULSION INTRAVENOUS at 07:41

## 2025-05-16 RX ADMIN — HYDROMORPHONE HYDROCHLORIDE 0.5 MG: 1 INJECTION, SOLUTION INTRAMUSCULAR; INTRAVENOUS; SUBCUTANEOUS at 09:12

## 2025-05-16 RX ADMIN — HEPARIN SODIUM 5000 UNITS: 5000 INJECTION INTRAVENOUS; SUBCUTANEOUS at 20:39

## 2025-05-16 RX ADMIN — INSULIN LISPRO 8 UNITS: 100 INJECTION, SOLUTION INTRAVENOUS; SUBCUTANEOUS at 16:13

## 2025-05-16 RX ADMIN — FERROUS SULFATE TAB 325 MG (65 MG ELEMENTAL FE) 325 MG: 325 (65 FE) TAB at 10:45

## 2025-05-16 RX ADMIN — SUCRALFATE 1 G: 1 TABLET ORAL at 16:12

## 2025-05-16 RX ADMIN — SUGAMMADEX 272 MG: 100 INJECTION, SOLUTION INTRAVENOUS at 08:36

## 2025-05-16 RX ADMIN — INSULIN GLARGINE 10 UNITS: 100 INJECTION, SOLUTION SUBCUTANEOUS at 20:38

## 2025-05-16 RX ADMIN — LABETALOL HYDROCHLORIDE 5 MG: 5 INJECTION INTRAVENOUS at 08:54

## 2025-05-16 RX ADMIN — HYDROMORPHONE HYDROCHLORIDE 0.25 MG: 1 INJECTION, SOLUTION INTRAMUSCULAR; INTRAVENOUS; SUBCUTANEOUS at 10:45

## 2025-05-16 RX ADMIN — HYDROMORPHONE HYDROCHLORIDE 0.5 MG: 2 INJECTION, SOLUTION INTRAMUSCULAR; INTRAVENOUS; SUBCUTANEOUS at 08:57

## 2025-05-16 RX ADMIN — SENNOSIDES AND DOCUSATE SODIUM 1 TABLET: 50; 8.6 TABLET ORAL at 10:45

## 2025-05-16 RX ADMIN — Medication 3 MG: at 20:37

## 2025-05-16 RX ADMIN — PANTOPRAZOLE SODIUM 40 MG: 40 TABLET, DELAYED RELEASE ORAL at 06:26

## 2025-05-16 RX ADMIN — Medication 100 MCG: at 08:01

## 2025-05-16 RX ADMIN — ROCURONIUM BROMIDE 30 MG: 10 INJECTION, SOLUTION INTRAVENOUS at 07:41

## 2025-05-16 RX ADMIN — INSULIN LISPRO 10 UNITS: 100 INJECTION, SOLUTION INTRAVENOUS; SUBCUTANEOUS at 16:12

## 2025-05-16 RX ADMIN — HYDROMORPHONE HYDROCHLORIDE 0.25 MG: 1 INJECTION, SOLUTION INTRAMUSCULAR; INTRAVENOUS; SUBCUTANEOUS at 06:25

## 2025-05-16 RX ADMIN — HYDROMORPHONE HYDROCHLORIDE 0.5 MG: 2 INJECTION, SOLUTION INTRAMUSCULAR; INTRAVENOUS; SUBCUTANEOUS at 08:28

## 2025-05-16 RX ADMIN — OXYCODONE AND ACETAMINOPHEN 1 TABLET: 5; 325 TABLET ORAL at 20:37

## 2025-05-16 RX ADMIN — SUCRALFATE 1 G: 1 TABLET ORAL at 10:45

## 2025-05-16 RX ADMIN — DEXAMETHASONE SODIUM PHOSPHATE 10 MG: 10 INJECTION INTRAMUSCULAR; INTRAVENOUS at 07:41

## 2025-05-16 RX ADMIN — HYDROMORPHONE HYDROCHLORIDE 0.25 MG: 1 INJECTION, SOLUTION INTRAMUSCULAR; INTRAVENOUS; SUBCUTANEOUS at 22:11

## 2025-05-16 RX ADMIN — SUCRALFATE 1 G: 1 TABLET ORAL at 06:26

## 2025-05-16 RX ADMIN — FENTANYL CITRATE 50 MCG: 50 INJECTION, SOLUTION INTRAMUSCULAR; INTRAVENOUS at 07:33

## 2025-05-16 RX ADMIN — SODIUM CHLORIDE, PRESERVATIVE FREE 10 ML: 5 INJECTION INTRAVENOUS at 20:41

## 2025-05-16 RX ADMIN — HYDRALAZINE HYDROCHLORIDE 5 MG: 20 INJECTION INTRAMUSCULAR; INTRAVENOUS at 09:54

## 2025-05-16 RX ADMIN — SUCRALFATE 1 G: 1 TABLET ORAL at 20:37

## 2025-05-16 RX ADMIN — INSULIN LISPRO 6 UNITS: 100 INJECTION, SOLUTION INTRAVENOUS; SUBCUTANEOUS at 20:38

## 2025-05-16 ASSESSMENT — PAIN SCALES - GENERAL
PAINLEVEL_OUTOF10: 8
PAINLEVEL_OUTOF10: 9
PAINLEVEL_OUTOF10: 8
PAINLEVEL_OUTOF10: 10
PAINLEVEL_OUTOF10: 6
PAINLEVEL_OUTOF10: 10
PAINLEVEL_OUTOF10: 8
PAINLEVEL_OUTOF10: 8

## 2025-05-16 ASSESSMENT — PAIN DESCRIPTION - DESCRIPTORS
DESCRIPTORS: THROBBING;SHARP;DISCOMFORT
DESCRIPTORS: ACHING;DISCOMFORT
DESCRIPTORS: ACHING;BURNING;CRAMPING
DESCRIPTORS: SHARP;THROBBING;CRAMPING
DESCRIPTORS: ACHING;BURNING;CRAMPING

## 2025-05-16 ASSESSMENT — PAIN DESCRIPTION - LOCATION
LOCATION: LEG

## 2025-05-16 ASSESSMENT — PAIN - FUNCTIONAL ASSESSMENT
PAIN_FUNCTIONAL_ASSESSMENT: ACTIVITIES ARE NOT PREVENTED
PAIN_FUNCTIONAL_ASSESSMENT: 0-10
PAIN_FUNCTIONAL_ASSESSMENT: ACTIVITIES ARE NOT PREVENTED
PAIN_FUNCTIONAL_ASSESSMENT: ACTIVITIES ARE NOT PREVENTED

## 2025-05-16 ASSESSMENT — ENCOUNTER SYMPTOMS
DYSPNEA ACTIVITY LEVEL: NO INTERVAL CHANGE
SHORTNESS OF BREATH: 1

## 2025-05-16 ASSESSMENT — LIFESTYLE VARIABLES: SMOKING_STATUS: 1

## 2025-05-16 ASSESSMENT — PAIN DESCRIPTION - ORIENTATION
ORIENTATION: LEFT

## 2025-05-16 NOTE — CARE COORDINATION
Met with pt to discuss discharge planning/transition of care. Pt was recently discharged from [facility] on 5/10, [facility] was lined up. Per pt she fell on Sunday or Monday 5/11-5/12, had left knee pain. Pt then went to her dialysis at [facility] TTS 1130am chair time. Pt was called by [facility] to inform her that her hgb was 6.6 and they advice pt to go to ER. When pt was at ER she complained of her knee pain, xray showed fracture, pt had ORIF this am 5/16. Will need therapy for recommendations. Per pt she lives in her one story home, no steps. She has 3LO2 through [employer] and wheelchair d/t her left ankle fx. Pt would like to go home at discharge if possible. Pt does have a 12 year old daughter who has special needs and is staying with friends/family getting good care per pt. Spoke with Francine at [facility] her ([phone]) will keep her updated. Spoke with Awa at [facility], since they didn't get into the home yet, new referral was made. Will need [facility] orders for cpa, med compliance, c aide and PT/OT if plan is home. Per pt she would like home care aide through insurance, left message for Francine at [facility] for a call back to update her on this request. Will follow for discharge. Discharge plan TBD, await therapy. If CHIARA would need a precert and choices.CLARENCE Maldonado, MARCELO  .Case Management Assessment  Initial Evaluation    Date/Time of Evaluation: 5/19/2025 11:45 AM  Assessment Completed by: CLARENCE Maldonado, MARCELO    If patient is discharged prior to next notation, then this note serves as note for discharge by case management.    Patient Name: Michelle Nettles                   YOB: 1985  Diagnosis: Syncope and collapse [R55]  End stage renal disease (HCC) [N18.6]  Unable to ambulate [R26.2]  Closed nondisplaced fracture of condyle of left femur, initial encounter ([facility]) [S72.415A]  Acute pain of left knee [M25.562]  Acute on chronic anemia [D64.9]                   Date /

## 2025-05-16 NOTE — ANESTHESIA POSTPROCEDURE EVALUATION
Department of Anesthesiology  Postprocedure Note    Patient: Michelle Nettles  MRN: [id number]  YOB: 1985  Date of evaluation: 5/16/2025    Procedure Summary       Date: 05/16/25 Room / Location: [address]    Anesthesia Start: 0728 Anesthesia Stop: 0901    Procedure: LEFT MEDIAL FEMORAL CONDYLE OPEN REDUCTION INTERNAL FIXATION (Left: Leg Upper) Diagnosis:       Displaced fracture of medial condyle of left femur, initial encounter for closed fracture (HCC)      (Displaced fracture of medial condyle of left femur, initial encounter for closed fracture (HCC) [S72.432A])    Surgeons: Peyman Germain MD Responsible Provider: Awa Muniz MD    Anesthesia Type: general ASA Status: 4            Anesthesia Type: No value filed.    David Phase I: David Score: 8    David Phase II:      Anesthesia Post Evaluation    Patient location during evaluation: PACU  Patient participation: complete - patient participated  Level of consciousness: awake and alert  Airway patency: patent  Nausea & Vomiting: no nausea and no vomiting  Cardiovascular status: blood pressure returned to baseline and hemodynamically stable  Respiratory status: acceptable and spontaneous ventilation  Hydration status: euvolemic  Multimodal analgesia pain management approach  Pain management: adequate    No notable events documented.

## 2025-05-16 NOTE — PLAN OF CARE
Problem: Chronic Conditions and Co-morbidities  Goal: Patient's chronic conditions and co-morbidity symptoms are monitored and maintained or improved  5/16/2025 1059 by Jenifer Cai RN  Outcome: Progressing  5/16/2025 0416 by Delvis Cartwright RN  Outcome: Progressing     Problem: Discharge Planning  Goal: Discharge to home or other facility with appropriate resources  5/16/2025 1059 by Jenifer Cai RN  Outcome: Progressing  5/16/2025 0416 by Delvis Cartwright RN  Outcome: Progressing     Problem: Pain  Goal: Verbalizes/displays adequate comfort level or baseline comfort level  5/16/2025 1059 by Jenifer Cai RN  Outcome: Progressing  5/16/2025 0416 by Delvis Cartwright RN  Outcome: Progressing     Problem: Safety - Adult  Goal: Free from fall injury  5/16/2025 1059 by Jenifer Cai RN  Outcome: Progressing  5/16/2025 0416 by Delvis Cartwright RN  Outcome: Progressing     Problem: ABCDS Injury Assessment  Goal: Absence of physical injury  5/16/2025 1059 by Jenifer Cai RN  Outcome: Progressing  5/16/2025 0416 by Delvis Cratwright RN  Outcome: Progressing

## 2025-05-16 NOTE — ANESTHESIA PRE PROCEDURE
Department of Anesthesiology  Preprocedure Note       Name:  Michelle Nettles   Age:  39 y.o.  :  1985                                          MRN:  [id number]         Date:  2025      Surgeon: Surgeon(s):  Peyman Germain MD    Procedure: Procedure(s):  LEFT MEDIAL FEMORAL CONDYLE OPEN REDUCTION INTERNAL FIXATION    Medications prior to admission:   Prior to Admission medications    Medication Sig Start Date End Date Taking? Authorizing Provider   albuterol (PROVENTIL) (5 MG/ML) 0.5% nebulizer solution Take 1 mL by nebulization 4 times daily as needed for Wheezing 24  Yes Della Nguyen MD   Continuous Glucose  (DEXCOM G7 ) LIANNE Used with sensors to check blood sugar  Patient not taking: Reported on 5/15/2025 5/12/25   Angelina Benitez APRN - CNP   Continuous Glucose Sensor (DEXCOM G7 SENSOR) MISC Change sensor every 10 days  Patient not taking: Reported on 5/15/2025 5/12/25   Angelina Benitez APRN - CNP   ofloxacin (FLOXIN) 0.3 % otic solution Place 5 drops into the right ear 2 times daily for 40 doses 25  Rachael Salcido MD   gabapentin (NEURONTIN) 100 MG capsule Take 1 capsule by mouth in the morning and 1 capsule in the evening.    ProviderAdrianne MD B Complex-C-Folic Acid (NEPHRO-JACOB) 0.8 MG TABS Take 1 tablet by mouth daily    Adrianne Rosales MD   nicotine (NICODERM CQ) 21 MG/24HR Place 1 patch onto the skin every 24 hours    Adrianne Rosales MD   ondansetron (ZOFRAN) 4 MG tablet Take 1 tablet by mouth every 8 hours as needed for Nausea or Vomiting    Adrianne Rosales MD   aspirin 81 MG chewable tablet Take 1 tablet by mouth daily    Adrianne Rosales MD   omeprazole (PRILOSEC) 40 MG delayed release capsule Take 1 capsule by mouth daily    Adrianne Rosales MD   Blood Pressure Monitoring (BLOOD PRESSURE CUFF) MISC Dx:  Hypertension with labile blood pressure. Check daily. Automated arm cuff  Patient not taking:

## 2025-05-17 LAB
ALBUMIN SERPL-MCNC: 3.4 G/DL (ref 3.5–5.2)
ALP SERPL-CCNC: 98 U/L (ref 35–104)
ALT SERPL-CCNC: <5 U/L (ref 0–35)
ANION GAP SERPL CALCULATED.3IONS-SCNC: 14 MMOL/L (ref 7–16)
AST SERPL-CCNC: 12 U/L (ref 0–35)
BASOPHILS # BLD: 0.01 K/UL (ref 0–0.2)
BASOPHILS NFR BLD: 0 % (ref 0–2)
BILIRUB SERPL-MCNC: 0.2 MG/DL (ref 0–1.2)
BUN SERPL-MCNC: 27 MG/DL (ref 6–20)
CALCIUM SERPL-MCNC: 9.1 MG/DL (ref 8.6–10)
CHLORIDE SERPL-SCNC: 96 MMOL/L (ref 98–107)
CO2 SERPL-SCNC: 25 MMOL/L (ref 22–29)
CREAT SERPL-MCNC: 6.7 MG/DL (ref 0.5–1)
EOSINOPHIL # BLD: 0.22 K/UL (ref 0.05–0.5)
EOSINOPHILS RELATIVE PERCENT: 4 % (ref 0–6)
ERYTHROCYTE [DISTWIDTH] IN BLOOD BY AUTOMATED COUNT: 14.3 % (ref 11.5–15)
GFR, ESTIMATED: 7 ML/MIN/1.73M2
GLUCOSE BLD-MCNC: 128 MG/DL (ref 74–99)
GLUCOSE BLD-MCNC: 241 MG/DL (ref 74–99)
GLUCOSE BLD-MCNC: 245 MG/DL (ref 74–99)
GLUCOSE BLD-MCNC: 287 MG/DL (ref 74–99)
GLUCOSE SERPL-MCNC: 137 MG/DL (ref 74–99)
HCT VFR BLD AUTO: 22.3 % (ref 34–48)
HGB BLD-MCNC: 7.5 G/DL (ref 11.5–15.5)
IMM GRANULOCYTES # BLD AUTO: 0.03 K/UL (ref 0–0.58)
IMM GRANULOCYTES NFR BLD: 1 % (ref 0–5)
LYMPHOCYTES NFR BLD: 1.21 K/UL (ref 1.5–4)
LYMPHOCYTES RELATIVE PERCENT: 21 % (ref 20–42)
MAGNESIUM SERPL-MCNC: 2.1 MG/DL (ref 1.6–2.6)
MCH RBC QN AUTO: 28.6 PG (ref 26–35)
MCHC RBC AUTO-ENTMCNC: 33.6 G/DL (ref 32–34.5)
MCV RBC AUTO: 85.1 FL (ref 80–99.9)
MONOCYTES NFR BLD: 0.52 K/UL (ref 0.1–0.95)
MONOCYTES NFR BLD: 9 % (ref 2–12)
NEUTROPHILS NFR BLD: 66 % (ref 43–80)
NEUTS SEG NFR BLD: 3.83 K/UL (ref 1.8–7.3)
PHOSPHATE SERPL-MCNC: 4.4 MG/DL (ref 2.5–4.5)
PLATELET # BLD AUTO: 250 K/UL (ref 130–450)
PMV BLD AUTO: 9.5 FL (ref 7–12)
POTASSIUM SERPL-SCNC: 3.6 MMOL/L (ref 3.5–5.1)
PROT SERPL-MCNC: 7.4 G/DL (ref 6.4–8.3)
RBC # BLD AUTO: 2.62 M/UL (ref 3.5–5.5)
SODIUM SERPL-SCNC: 135 MMOL/L (ref 136–145)
WBC OTHER # BLD: 5.8 K/UL (ref 4.5–11.5)

## 2025-05-17 PROCEDURE — 2700000000 HC OXYGEN THERAPY PER DAY

## 2025-05-17 PROCEDURE — 85025 COMPLETE CBC W/AUTO DIFF WBC: CPT

## 2025-05-17 PROCEDURE — 36415 COLL VENOUS BLD VENIPUNCTURE: CPT

## 2025-05-17 PROCEDURE — 84100 ASSAY OF PHOSPHORUS: CPT

## 2025-05-17 PROCEDURE — 6370000000 HC RX 637 (ALT 250 FOR IP)

## 2025-05-17 PROCEDURE — 82962 GLUCOSE BLOOD TEST: CPT

## 2025-05-17 PROCEDURE — 90935 HEMODIALYSIS ONE EVALUATION: CPT

## 2025-05-17 PROCEDURE — 80053 COMPREHEN METABOLIC PANEL: CPT

## 2025-05-17 PROCEDURE — 6360000002 HC RX W HCPCS: Performed by: STUDENT IN AN ORGANIZED HEALTH CARE EDUCATION/TRAINING PROGRAM

## 2025-05-17 PROCEDURE — 6370000000 HC RX 637 (ALT 250 FOR IP): Performed by: STUDENT IN AN ORGANIZED HEALTH CARE EDUCATION/TRAINING PROGRAM

## 2025-05-17 PROCEDURE — 99232 SBSQ HOSP IP/OBS MODERATE 35: CPT | Performed by: STUDENT IN AN ORGANIZED HEALTH CARE EDUCATION/TRAINING PROGRAM

## 2025-05-17 PROCEDURE — 2500000003 HC RX 250 WO HCPCS: Performed by: STUDENT IN AN ORGANIZED HEALTH CARE EDUCATION/TRAINING PROGRAM

## 2025-05-17 PROCEDURE — 2060000000 HC ICU INTERMEDIATE R&B

## 2025-05-17 PROCEDURE — 83735 ASSAY OF MAGNESIUM: CPT

## 2025-05-17 RX ADMIN — FERROUS SULFATE TAB 325 MG (65 MG ELEMENTAL FE) 325 MG: 325 (65 FE) TAB at 12:24

## 2025-05-17 RX ADMIN — OXYCODONE AND ACETAMINOPHEN 1 TABLET: 5; 325 TABLET ORAL at 20:15

## 2025-05-17 RX ADMIN — SODIUM CHLORIDE, PRESERVATIVE FREE 10 ML: 5 INJECTION INTRAVENOUS at 12:25

## 2025-05-17 RX ADMIN — INSULIN LISPRO 4 UNITS: 100 INJECTION, SOLUTION INTRAVENOUS; SUBCUTANEOUS at 12:28

## 2025-05-17 RX ADMIN — PANTOPRAZOLE SODIUM 40 MG: 40 TABLET, DELAYED RELEASE ORAL at 06:09

## 2025-05-17 RX ADMIN — INSULIN LISPRO 4 UNITS: 100 INJECTION, SOLUTION INTRAVENOUS; SUBCUTANEOUS at 17:24

## 2025-05-17 RX ADMIN — CARVEDILOL 25 MG: 25 TABLET, FILM COATED ORAL at 17:23

## 2025-05-17 RX ADMIN — OXYCODONE AND ACETAMINOPHEN 1 TABLET: 5; 325 TABLET ORAL at 02:34

## 2025-05-17 RX ADMIN — AMLODIPINE BESYLATE 10 MG: 10 TABLET ORAL at 12:23

## 2025-05-17 RX ADMIN — OXYCODONE AND ACETAMINOPHEN 1 TABLET: 5; 325 TABLET ORAL at 10:02

## 2025-05-17 RX ADMIN — ASPIRIN 81 MG CHEWABLE TABLET 81 MG: 81 TABLET CHEWABLE at 12:24

## 2025-05-17 RX ADMIN — SODIUM CHLORIDE, PRESERVATIVE FREE 10 ML: 5 INJECTION INTRAVENOUS at 20:10

## 2025-05-17 RX ADMIN — HYDROMORPHONE HYDROCHLORIDE 0.25 MG: 1 INJECTION, SOLUTION INTRAMUSCULAR; INTRAVENOUS; SUBCUTANEOUS at 21:50

## 2025-05-17 RX ADMIN — HYDROMORPHONE HYDROCHLORIDE 0.25 MG: 1 INJECTION, SOLUTION INTRAMUSCULAR; INTRAVENOUS; SUBCUTANEOUS at 12:24

## 2025-05-17 RX ADMIN — OXYCODONE AND ACETAMINOPHEN 1 TABLET: 5; 325 TABLET ORAL at 14:55

## 2025-05-17 RX ADMIN — SUCRALFATE 1 G: 1 TABLET ORAL at 12:24

## 2025-05-17 RX ADMIN — INSULIN GLARGINE 10 UNITS: 100 INJECTION, SOLUTION SUBCUTANEOUS at 20:08

## 2025-05-17 RX ADMIN — HEPARIN SODIUM 5000 UNITS: 5000 INJECTION INTRAVENOUS; SUBCUTANEOUS at 06:09

## 2025-05-17 RX ADMIN — HEPARIN SODIUM 5000 UNITS: 5000 INJECTION INTRAVENOUS; SUBCUTANEOUS at 14:55

## 2025-05-17 RX ADMIN — SUCRALFATE 1 G: 1 TABLET ORAL at 17:23

## 2025-05-17 RX ADMIN — HYDROMORPHONE HYDROCHLORIDE 0.25 MG: 1 INJECTION, SOLUTION INTRAMUSCULAR; INTRAVENOUS; SUBCUTANEOUS at 17:25

## 2025-05-17 RX ADMIN — GABAPENTIN 100 MG: 100 CAPSULE ORAL at 17:23

## 2025-05-17 RX ADMIN — SENNOSIDES AND DOCUSATE SODIUM 1 TABLET: 50; 8.6 TABLET ORAL at 20:08

## 2025-05-17 RX ADMIN — SUCRALFATE 1 G: 1 TABLET ORAL at 06:09

## 2025-05-17 RX ADMIN — HYDROMORPHONE HYDROCHLORIDE 0.25 MG: 1 INJECTION, SOLUTION INTRAMUSCULAR; INTRAVENOUS; SUBCUTANEOUS at 06:10

## 2025-05-17 RX ADMIN — SUCRALFATE 1 G: 1 TABLET ORAL at 20:08

## 2025-05-17 RX ADMIN — INSULIN LISPRO 2 UNITS: 100 INJECTION, SOLUTION INTRAVENOUS; SUBCUTANEOUS at 20:08

## 2025-05-17 RX ADMIN — GABAPENTIN 100 MG: 100 CAPSULE ORAL at 12:23

## 2025-05-17 RX ADMIN — Medication 3 MG: at 20:08

## 2025-05-17 ASSESSMENT — PAIN SCALES - GENERAL
PAINLEVEL_OUTOF10: 8
PAINLEVEL_OUTOF10: 5
PAINLEVEL_OUTOF10: 5
PAINLEVEL_OUTOF10: 9
PAINLEVEL_OUTOF10: 0
PAINLEVEL_OUTOF10: 9
PAINLEVEL_OUTOF10: 0
PAINLEVEL_OUTOF10: 8
PAINLEVEL_OUTOF10: 8
PAINLEVEL_OUTOF10: 9
PAINLEVEL_OUTOF10: 0
PAINLEVEL_OUTOF10: 6
PAINLEVEL_OUTOF10: 8
PAINLEVEL_OUTOF10: 9

## 2025-05-17 ASSESSMENT — PAIN DESCRIPTION - DESCRIPTORS
DESCRIPTORS: ACHING;SHARP;SHOOTING
DESCRIPTORS: THROBBING;SHARP;DISCOMFORT
DESCRIPTORS: ACHING;SORE;SHARP
DESCRIPTORS: ACHING;SHARP;SHOOTING
DESCRIPTORS: ACHING;SHARP;SORE
DESCRIPTORS: DISCOMFORT;THROBBING;SHARP

## 2025-05-17 ASSESSMENT — PAIN DESCRIPTION - PAIN TYPE
TYPE: SURGICAL PAIN
TYPE: SURGICAL PAIN

## 2025-05-17 ASSESSMENT — PAIN DESCRIPTION - LOCATION
LOCATION: LEG
LOCATION: GENERALIZED
LOCATION: KNEE
LOCATION: GENERALIZED
LOCATION: LEG
LOCATION: GENERALIZED
LOCATION: LEG
LOCATION: LEG

## 2025-05-17 ASSESSMENT — PAIN DESCRIPTION - FREQUENCY
FREQUENCY: CONTINUOUS

## 2025-05-17 ASSESSMENT — PAIN SCALES - WONG BAKER
WONGBAKER_NUMERICALRESPONSE: NO HURT
WONGBAKER_NUMERICALRESPONSE: HURTS WORST
WONGBAKER_NUMERICALRESPONSE: HURTS WHOLE LOT
WONGBAKER_NUMERICALRESPONSE: HURTS WHOLE LOT
WONGBAKER_NUMERICALRESPONSE: NO HURT
WONGBAKER_NUMERICALRESPONSE: NO HURT
WONGBAKER_NUMERICALRESPONSE: HURTS WHOLE LOT

## 2025-05-17 ASSESSMENT — PAIN DESCRIPTION - ORIENTATION
ORIENTATION: LEFT
ORIENTATION: RIGHT
ORIENTATION: LEFT;RIGHT
ORIENTATION: RIGHT;LEFT
ORIENTATION: LEFT
ORIENTATION: LEFT

## 2025-05-17 ASSESSMENT — PAIN - FUNCTIONAL ASSESSMENT
PAIN_FUNCTIONAL_ASSESSMENT: PREVENTS OR INTERFERES SOME ACTIVE ACTIVITIES AND ADLS
PAIN_FUNCTIONAL_ASSESSMENT: ACTIVITIES ARE NOT PREVENTED
PAIN_FUNCTIONAL_ASSESSMENT: ACTIVITIES ARE NOT PREVENTED

## 2025-05-17 ASSESSMENT — PAIN DESCRIPTION - ONSET
ONSET: ON-GOING

## 2025-05-17 NOTE — FLOWSHEET NOTE
05/17/25 1101   Vital Signs   BP (!) 95/58   Temp 96.9 °F (36.1 °C)   Pulse (!) 107   Respirations 16   Weight - Scale 65.2 kg (143 lb 11.8 oz)   Weight Method Bed scale   Percent Weight Change -4.17   Post-Hemodialysis Assessment   Post-Treatment Procedures Blood returned;Access bleeding time < 10 minutes   Machine Disinfection Process Exterior Machine Disinfection   Rinseback Volume (ml) 300 ml   Blood Volume Processed (Liters) 85.8 L   Dialyzer Clearance Lightly streaked   Duration of Treatment (minutes) 240 minutes   Heparin Amount Administered During Treatment (mL) 0 mL   Hemodialysis Intake (ml) 300 ml   Hemodialysis Output (ml) 2500 ml   NET Removed (ml) 2200   Tolerated Treatment Good   Patient Response to Treatment tolerated well, blood returned, needles pulled, sites held, stasis achieved, bandaids applied, +thirll, +bruit

## 2025-05-17 NOTE — PLAN OF CARE
Problem: Chronic Conditions and Co-morbidities  Goal: Patient's chronic conditions and co-morbidity symptoms are monitored and maintained or improved  5/16/2025 2328 by Leila Ferris RN  Outcome: Progressing     Problem: Discharge Planning  Goal: Discharge to home or other facility with appropriate resources  5/16/2025 2328 by Leila Ferris RN  Outcome: Progressing     Problem: Pain  Goal: Verbalizes/displays adequate comfort level or baseline comfort level  5/16/2025 2328 by Leila Ferris RN  Outcome: Progressing     Problem: Safety - Adult  Goal: Free from fall injury  5/16/2025 2328 by Leila Ferris RN  Outcome: Progressing     Problem: ABCDS Injury Assessment  Goal: Absence of physical injury  5/16/2025 2328 by Leila Ferris RN  Outcome: Progressing

## 2025-05-18 LAB
ALBUMIN SERPL-MCNC: 3.4 G/DL (ref 3.5–5.2)
ALP SERPL-CCNC: 101 U/L (ref 35–104)
ALT SERPL-CCNC: <5 U/L (ref 0–35)
ANION GAP SERPL CALCULATED.3IONS-SCNC: 14 MMOL/L (ref 7–16)
AST SERPL-CCNC: 15 U/L (ref 0–35)
BASOPHILS # BLD: 0.01 K/UL (ref 0–0.2)
BASOPHILS NFR BLD: 0 % (ref 0–2)
BILIRUB SERPL-MCNC: <0.2 MG/DL (ref 0–1.2)
BUN SERPL-MCNC: 15 MG/DL (ref 6–20)
CALCIUM SERPL-MCNC: 9.1 MG/DL (ref 8.6–10)
CHLORIDE SERPL-SCNC: 96 MMOL/L (ref 98–107)
CO2 SERPL-SCNC: 26 MMOL/L (ref 22–29)
CREAT SERPL-MCNC: 4.7 MG/DL (ref 0.5–1)
EOSINOPHIL # BLD: 0.27 K/UL (ref 0.05–0.5)
EOSINOPHILS RELATIVE PERCENT: 6 % (ref 0–6)
ERYTHROCYTE [DISTWIDTH] IN BLOOD BY AUTOMATED COUNT: 14.4 % (ref 11.5–15)
GFR, ESTIMATED: 11 ML/MIN/1.73M2
GLUCOSE BLD-MCNC: 241 MG/DL (ref 74–99)
GLUCOSE BLD-MCNC: 309 MG/DL (ref 74–99)
GLUCOSE BLD-MCNC: >500 MG/DL (ref 74–99)
GLUCOSE SERPL-MCNC: 300 MG/DL (ref 74–99)
GLUCOSE SERPL-MCNC: 563 MG/DL (ref 74–99)
HCT VFR BLD AUTO: 23.4 % (ref 34–48)
HGB BLD-MCNC: 7.6 G/DL (ref 11.5–15.5)
IMM GRANULOCYTES # BLD AUTO: <0.03 K/UL (ref 0–0.58)
IMM GRANULOCYTES NFR BLD: 1 % (ref 0–5)
LYMPHOCYTES NFR BLD: 1.1 K/UL (ref 1.5–4)
LYMPHOCYTES RELATIVE PERCENT: 25 % (ref 20–42)
MAGNESIUM SERPL-MCNC: 1.9 MG/DL (ref 1.6–2.6)
MCH RBC QN AUTO: 28.7 PG (ref 26–35)
MCHC RBC AUTO-ENTMCNC: 32.5 G/DL (ref 32–34.5)
MCV RBC AUTO: 88.3 FL (ref 80–99.9)
MONOCYTES NFR BLD: 0.36 K/UL (ref 0.1–0.95)
MONOCYTES NFR BLD: 8 % (ref 2–12)
NEUTROPHILS NFR BLD: 60 % (ref 43–80)
NEUTS SEG NFR BLD: 2.58 K/UL (ref 1.8–7.3)
PHOSPHATE SERPL-MCNC: 3.8 MG/DL (ref 2.5–4.5)
PLATELET # BLD AUTO: 232 K/UL (ref 130–450)
PMV BLD AUTO: 9.7 FL (ref 7–12)
POTASSIUM SERPL-SCNC: 3.5 MMOL/L (ref 3.5–5.1)
PROT SERPL-MCNC: 7.1 G/DL (ref 6.4–8.3)
RBC # BLD AUTO: 2.65 M/UL (ref 3.5–5.5)
SODIUM SERPL-SCNC: 136 MMOL/L (ref 136–145)
WBC OTHER # BLD: 4.3 K/UL (ref 4.5–11.5)

## 2025-05-18 PROCEDURE — 6370000000 HC RX 637 (ALT 250 FOR IP): Performed by: STUDENT IN AN ORGANIZED HEALTH CARE EDUCATION/TRAINING PROGRAM

## 2025-05-18 PROCEDURE — 6370000000 HC RX 637 (ALT 250 FOR IP)

## 2025-05-18 PROCEDURE — 99232 SBSQ HOSP IP/OBS MODERATE 35: CPT | Performed by: STUDENT IN AN ORGANIZED HEALTH CARE EDUCATION/TRAINING PROGRAM

## 2025-05-18 PROCEDURE — 84100 ASSAY OF PHOSPHORUS: CPT

## 2025-05-18 PROCEDURE — 2060000000 HC ICU INTERMEDIATE R&B

## 2025-05-18 PROCEDURE — 2500000003 HC RX 250 WO HCPCS: Performed by: STUDENT IN AN ORGANIZED HEALTH CARE EDUCATION/TRAINING PROGRAM

## 2025-05-18 PROCEDURE — 6360000002 HC RX W HCPCS: Performed by: STUDENT IN AN ORGANIZED HEALTH CARE EDUCATION/TRAINING PROGRAM

## 2025-05-18 PROCEDURE — 36415 COLL VENOUS BLD VENIPUNCTURE: CPT

## 2025-05-18 PROCEDURE — 80053 COMPREHEN METABOLIC PANEL: CPT

## 2025-05-18 PROCEDURE — 2700000000 HC OXYGEN THERAPY PER DAY

## 2025-05-18 PROCEDURE — 82947 ASSAY GLUCOSE BLOOD QUANT: CPT

## 2025-05-18 PROCEDURE — 85025 COMPLETE CBC W/AUTO DIFF WBC: CPT

## 2025-05-18 PROCEDURE — 6370000000 HC RX 637 (ALT 250 FOR IP): Performed by: NURSE PRACTITIONER

## 2025-05-18 PROCEDURE — 83735 ASSAY OF MAGNESIUM: CPT

## 2025-05-18 PROCEDURE — 82962 GLUCOSE BLOOD TEST: CPT

## 2025-05-18 RX ORDER — MIDODRINE HYDROCHLORIDE 5 MG/1
5 TABLET ORAL ONCE
Status: COMPLETED | OUTPATIENT
Start: 2025-05-18 | End: 2025-05-18

## 2025-05-18 RX ORDER — ASPIRIN 81 MG/1
81 TABLET ORAL 2 TIMES DAILY
Qty: 60 TABLET | Refills: 0 | Status: SHIPPED | OUTPATIENT
Start: 2025-05-18 | End: 2025-06-17

## 2025-05-18 RX ORDER — CYCLOBENZAPRINE HCL 10 MG
10 TABLET ORAL 3 TIMES DAILY PRN
Status: DISCONTINUED | OUTPATIENT
Start: 2025-05-18 | End: 2025-05-19 | Stop reason: HOSPADM

## 2025-05-18 RX ORDER — INSULIN LISPRO 100 [IU]/ML
10 INJECTION, SOLUTION INTRAVENOUS; SUBCUTANEOUS ONCE
Status: COMPLETED | OUTPATIENT
Start: 2025-05-18 | End: 2025-05-18

## 2025-05-18 RX ORDER — DIPHENHYDRAMINE HCL 25 MG
25 TABLET ORAL ONCE
Status: COMPLETED | OUTPATIENT
Start: 2025-05-18 | End: 2025-05-18

## 2025-05-18 RX ORDER — MIDODRINE HYDROCHLORIDE 5 MG/1
5 TABLET ORAL 2 TIMES DAILY WITH MEALS
Status: DISCONTINUED | OUTPATIENT
Start: 2025-05-18 | End: 2025-05-19 | Stop reason: HOSPADM

## 2025-05-18 RX ORDER — INSULIN LISPRO 100 [IU]/ML
0-16 INJECTION, SOLUTION INTRAVENOUS; SUBCUTANEOUS
Status: DISCONTINUED | OUTPATIENT
Start: 2025-05-18 | End: 2025-05-19 | Stop reason: HOSPADM

## 2025-05-18 RX ORDER — OXYCODONE AND ACETAMINOPHEN 10; 325 MG/1; MG/1
1 TABLET ORAL EVERY 4 HOURS PRN
Refills: 0 | Status: DISCONTINUED | OUTPATIENT
Start: 2025-05-18 | End: 2025-05-19 | Stop reason: HOSPADM

## 2025-05-18 RX ORDER — METHOCARBAMOL 500 MG/1
500 TABLET, FILM COATED ORAL 4 TIMES DAILY
Status: DISCONTINUED | OUTPATIENT
Start: 2025-05-18 | End: 2025-05-19 | Stop reason: HOSPADM

## 2025-05-18 RX ADMIN — MIDODRINE HYDROCHLORIDE 5 MG: 5 TABLET ORAL at 04:54

## 2025-05-18 RX ADMIN — OXYCODONE AND ACETAMINOPHEN 1 TABLET: 5; 325 TABLET ORAL at 01:02

## 2025-05-18 RX ADMIN — METHOCARBAMOL 500 MG: 500 TABLET ORAL at 20:05

## 2025-05-18 RX ADMIN — POLYETHYLENE GLYCOL 3350 17 G: 17 POWDER, FOR SOLUTION ORAL at 09:20

## 2025-05-18 RX ADMIN — Medication 3 MG: at 20:05

## 2025-05-18 RX ADMIN — MIDODRINE HYDROCHLORIDE 5 MG: 5 TABLET ORAL at 09:20

## 2025-05-18 RX ADMIN — CYCLOBENZAPRINE 10 MG: 10 TABLET, FILM COATED ORAL at 11:19

## 2025-05-18 RX ADMIN — GABAPENTIN 100 MG: 100 CAPSULE ORAL at 18:13

## 2025-05-18 RX ADMIN — OXYCODONE AND ACETAMINOPHEN 1 TABLET: 325; 10 TABLET ORAL at 20:15

## 2025-05-18 RX ADMIN — INSULIN LISPRO 2 UNITS: 100 INJECTION, SOLUTION INTRAVENOUS; SUBCUTANEOUS at 11:16

## 2025-05-18 RX ADMIN — INSULIN LISPRO 10 UNITS: 100 INJECTION, SOLUTION INTRAVENOUS; SUBCUTANEOUS at 20:04

## 2025-05-18 RX ADMIN — SODIUM CHLORIDE, PRESERVATIVE FREE 10 ML: 5 INJECTION INTRAVENOUS at 10:51

## 2025-05-18 RX ADMIN — HYDROMORPHONE HYDROCHLORIDE 0.25 MG: 1 INJECTION, SOLUTION INTRAMUSCULAR; INTRAVENOUS; SUBCUTANEOUS at 03:14

## 2025-05-18 RX ADMIN — OXYCODONE AND ACETAMINOPHEN 1 TABLET: 5; 325 TABLET ORAL at 09:24

## 2025-05-18 RX ADMIN — MIDODRINE HYDROCHLORIDE 5 MG: 5 TABLET ORAL at 18:13

## 2025-05-18 RX ADMIN — HEPARIN SODIUM 5000 UNITS: 5000 INJECTION INTRAVENOUS; SUBCUTANEOUS at 04:54

## 2025-05-18 RX ADMIN — HEPARIN SODIUM 5000 UNITS: 5000 INJECTION INTRAVENOUS; SUBCUTANEOUS at 20:06

## 2025-05-18 RX ADMIN — OXYCODONE AND ACETAMINOPHEN 1 TABLET: 325; 10 TABLET ORAL at 15:10

## 2025-05-18 RX ADMIN — SODIUM CHLORIDE, PRESERVATIVE FREE 10 ML: 5 INJECTION INTRAVENOUS at 20:05

## 2025-05-18 RX ADMIN — ASPIRIN 81 MG CHEWABLE TABLET 81 MG: 81 TABLET CHEWABLE at 09:20

## 2025-05-18 RX ADMIN — CYCLOBENZAPRINE 10 MG: 10 TABLET, FILM COATED ORAL at 20:16

## 2025-05-18 RX ADMIN — PANTOPRAZOLE SODIUM 40 MG: 40 TABLET, DELAYED RELEASE ORAL at 09:20

## 2025-05-18 RX ADMIN — DIPHENHYDRAMINE HYDROCHLORIDE 25 MG: 25 TABLET ORAL at 20:16

## 2025-05-18 RX ADMIN — SENNOSIDES AND DOCUSATE SODIUM 1 TABLET: 50; 8.6 TABLET ORAL at 09:20

## 2025-05-18 RX ADMIN — SUCRALFATE 1 G: 1 TABLET ORAL at 20:05

## 2025-05-18 RX ADMIN — INSULIN GLARGINE 10 UNITS: 100 INJECTION, SOLUTION SUBCUTANEOUS at 20:04

## 2025-05-18 RX ADMIN — FERROUS SULFATE TAB 325 MG (65 MG ELEMENTAL FE) 325 MG: 325 (65 FE) TAB at 09:20

## 2025-05-18 RX ADMIN — INSULIN LISPRO 16 UNITS: 100 INJECTION, SOLUTION INTRAVENOUS; SUBCUTANEOUS at 20:04

## 2025-05-18 RX ADMIN — SUCRALFATE 1 G: 1 TABLET ORAL at 11:20

## 2025-05-18 RX ADMIN — HEPARIN SODIUM 5000 UNITS: 5000 INJECTION INTRAVENOUS; SUBCUTANEOUS at 15:05

## 2025-05-18 RX ADMIN — METHOCARBAMOL 500 MG: 500 TABLET ORAL at 18:13

## 2025-05-18 RX ADMIN — GABAPENTIN 100 MG: 100 CAPSULE ORAL at 09:21

## 2025-05-18 RX ADMIN — HEPARIN SODIUM 5000 UNITS: 5000 INJECTION INTRAVENOUS; SUBCUTANEOUS at 02:33

## 2025-05-18 RX ADMIN — SENNOSIDES AND DOCUSATE SODIUM 1 TABLET: 50; 8.6 TABLET ORAL at 20:04

## 2025-05-18 RX ADMIN — SUCRALFATE 1 G: 1 TABLET ORAL at 09:20

## 2025-05-18 RX ADMIN — OXYCODONE AND ACETAMINOPHEN 1 TABLET: 325; 10 TABLET ORAL at 11:19

## 2025-05-18 RX ADMIN — SUCRALFATE 1 G: 1 TABLET ORAL at 18:13

## 2025-05-18 RX ADMIN — INSULIN LISPRO 6 UNITS: 100 INJECTION, SOLUTION INTRAVENOUS; SUBCUTANEOUS at 05:01

## 2025-05-18 ASSESSMENT — PAIN DESCRIPTION - LOCATION
LOCATION: LEG
LOCATION: LEG
LOCATION: LEG;KNEE
LOCATION: LEG;ANKLE;KNEE
LOCATION: GENERALIZED
LOCATION: GENERALIZED

## 2025-05-18 ASSESSMENT — PAIN SCALES - GENERAL
PAINLEVEL_OUTOF10: 0
PAINLEVEL_OUTOF10: 0
PAINLEVEL_OUTOF10: 4
PAINLEVEL_OUTOF10: 10
PAINLEVEL_OUTOF10: 8
PAINLEVEL_OUTOF10: 8
PAINLEVEL_OUTOF10: 4
PAINLEVEL_OUTOF10: 6
PAINLEVEL_OUTOF10: 8
PAINLEVEL_OUTOF10: 5
PAINLEVEL_OUTOF10: 10
PAINLEVEL_OUTOF10: 10
PAINLEVEL_OUTOF10: 0

## 2025-05-18 ASSESSMENT — PAIN SCALES - WONG BAKER
WONGBAKER_NUMERICALRESPONSE: NO HURT
WONGBAKER_NUMERICALRESPONSE: NO HURT

## 2025-05-18 ASSESSMENT — PAIN DESCRIPTION - DESCRIPTORS
DESCRIPTORS: ITCHING;DISCOMFORT
DESCRIPTORS: SHOOTING;THROBBING;SORE
DESCRIPTORS: THROBBING;SORE;DISCOMFORT
DESCRIPTORS: DISCOMFORT;SORE;SHOOTING
DESCRIPTORS: ACHING;SQUEEZING;SPASM

## 2025-05-18 ASSESSMENT — PAIN DESCRIPTION - ORIENTATION: ORIENTATION: RIGHT

## 2025-05-18 NOTE — PLAN OF CARE
Problem: Chronic Conditions and Co-morbidities  Goal: Patient's chronic conditions and co-morbidity symptoms are monitored and maintained or improved  5/18/2025 0658 by Avis Do RN  Outcome: Progressing     Problem: Discharge Planning  Goal: Discharge to home or other facility with appropriate resources  5/18/2025 0658 by Avis Do RN  Outcome: Progressing     Problem: Pain  Goal: Verbalizes/displays adequate comfort level or baseline comfort level  5/18/2025 0658 by Avis Do RN  Outcome: Progressing     Problem: Safety - Adult  Goal: Free from fall injury  5/18/2025 0658 by Avis Do RN  Outcome: Progressing     Problem: ABCDS Injury Assessment  Goal: Absence of physical injury  5/18/2025 0658 by Avis Do RN  Outcome: Progressing

## 2025-05-18 NOTE — PLAN OF CARE
Problem: Chronic Conditions and Co-morbidities  Goal: Patient's chronic conditions and co-morbidity symptoms are monitored and maintained or improved  5/18/2025 0658 by Avis Do RN  Outcome: Progressing  5/17/2025 1805 by Eileen Haney RN  Outcome: Progressing     Problem: Discharge Planning  Goal: Discharge to home or other facility with appropriate resources  5/18/2025 0658 by Avis Do RN  Outcome: Progressing  5/17/2025 1805 by Eileen Haney RN  Outcome: Progressing     Problem: Pain  Goal: Verbalizes/displays adequate comfort level or baseline comfort level  5/18/2025 0658 by Avis Do RN  Outcome: Progressing  5/17/2025 1805 by Eileen Haney RN  Outcome: Progressing     Problem: Safety - Adult  Goal: Free from fall injury  5/18/2025 0658 by Avis Do RN  Outcome: Progressing  5/17/2025 1805 by Eileen Haney RN  Outcome: Progressing     Problem: ABCDS Injury Assessment  Goal: Absence of physical injury  5/18/2025 0658 by Avis Do RN  Outcome: Progressing  5/17/2025 1805 by Eileen Haney RN  Outcome: Progressing

## 2025-05-19 ENCOUNTER — TELEPHONE (OUTPATIENT)
Dept: PRIMARY CARE CLINIC | Age: 40
End: 2025-05-19

## 2025-05-19 VITALS
BODY MASS INDEX: 31.07 KG/M2 | DIASTOLIC BLOOD PRESSURE: 65 MMHG | RESPIRATION RATE: 16 BRPM | TEMPERATURE: 97.2 F | OXYGEN SATURATION: 100 % | HEIGHT: 59 IN | HEART RATE: 107 BPM | SYSTOLIC BLOOD PRESSURE: 120 MMHG | WEIGHT: 154.1 LBS

## 2025-05-19 LAB
ALBUMIN SERPL-MCNC: 3.7 G/DL (ref 3.5–5.2)
ALP SERPL-CCNC: 114 U/L (ref 35–104)
ALT SERPL-CCNC: <5 U/L (ref 0–35)
ANION GAP SERPL CALCULATED.3IONS-SCNC: 16 MMOL/L (ref 7–16)
AST SERPL-CCNC: 16 U/L (ref 0–35)
BASOPHILS # BLD: 0.02 K/UL (ref 0–0.2)
BASOPHILS NFR BLD: 0 % (ref 0–2)
BILIRUB SERPL-MCNC: <0.2 MG/DL (ref 0–1.2)
BUN SERPL-MCNC: 36 MG/DL (ref 6–20)
CALCIUM SERPL-MCNC: 9.7 MG/DL (ref 8.6–10)
CHLORIDE SERPL-SCNC: 94 MMOL/L (ref 98–107)
CO2 SERPL-SCNC: 24 MMOL/L (ref 22–29)
CREAT SERPL-MCNC: 7.6 MG/DL (ref 0.5–1)
EOSINOPHIL # BLD: 0.43 K/UL (ref 0.05–0.5)
EOSINOPHILS RELATIVE PERCENT: 6 % (ref 0–6)
ERYTHROCYTE [DISTWIDTH] IN BLOOD BY AUTOMATED COUNT: 14.4 % (ref 11.5–15)
GFR, ESTIMATED: 6 ML/MIN/1.73M2
GLUCOSE BLD-MCNC: 226 MG/DL (ref 74–99)
GLUCOSE BLD-MCNC: 244 MG/DL (ref 74–99)
GLUCOSE BLD-MCNC: 330 MG/DL (ref 74–99)
GLUCOSE SERPL-MCNC: 351 MG/DL (ref 74–99)
HCT VFR BLD AUTO: 25.5 % (ref 34–48)
HGB BLD-MCNC: 8.2 G/DL (ref 11.5–15.5)
IMM GRANULOCYTES # BLD AUTO: <0.03 K/UL (ref 0–0.58)
IMM GRANULOCYTES NFR BLD: 0 % (ref 0–5)
LYMPHOCYTES NFR BLD: 1.31 K/UL (ref 1.5–4)
LYMPHOCYTES RELATIVE PERCENT: 19 % (ref 20–42)
MAGNESIUM SERPL-MCNC: 2.1 MG/DL (ref 1.6–2.6)
MCH RBC QN AUTO: 28.8 PG (ref 26–35)
MCHC RBC AUTO-ENTMCNC: 32.2 G/DL (ref 32–34.5)
MCV RBC AUTO: 89.5 FL (ref 80–99.9)
MONOCYTES NFR BLD: 0.45 K/UL (ref 0.1–0.95)
MONOCYTES NFR BLD: 6 % (ref 2–12)
NEUTROPHILS NFR BLD: 68 % (ref 43–80)
NEUTS SEG NFR BLD: 4.81 K/UL (ref 1.8–7.3)
PHOSPHATE SERPL-MCNC: 4.3 MG/DL (ref 2.5–4.5)
PLATELET # BLD AUTO: 303 K/UL (ref 130–450)
PMV BLD AUTO: 9.6 FL (ref 7–12)
POTASSIUM SERPL-SCNC: 4.2 MMOL/L (ref 3.5–5.1)
PROT SERPL-MCNC: 7.9 G/DL (ref 6.4–8.3)
RBC # BLD AUTO: 2.85 M/UL (ref 3.5–5.5)
SODIUM SERPL-SCNC: 134 MMOL/L (ref 136–145)
WBC OTHER # BLD: 7 K/UL (ref 4.5–11.5)

## 2025-05-19 PROCEDURE — 83735 ASSAY OF MAGNESIUM: CPT

## 2025-05-19 PROCEDURE — 6370000000 HC RX 637 (ALT 250 FOR IP): Performed by: STUDENT IN AN ORGANIZED HEALTH CARE EDUCATION/TRAINING PROGRAM

## 2025-05-19 PROCEDURE — L1832 KO ADJ JNT POS R SUP PRE CST: HCPCS

## 2025-05-19 PROCEDURE — 99239 HOSP IP/OBS DSCHRG MGMT >30: CPT | Performed by: INTERNAL MEDICINE

## 2025-05-19 PROCEDURE — 82962 GLUCOSE BLOOD TEST: CPT

## 2025-05-19 PROCEDURE — 80053 COMPREHEN METABOLIC PANEL: CPT

## 2025-05-19 PROCEDURE — 97161 PT EVAL LOW COMPLEX 20 MIN: CPT

## 2025-05-19 PROCEDURE — 6360000002 HC RX W HCPCS: Performed by: STUDENT IN AN ORGANIZED HEALTH CARE EDUCATION/TRAINING PROGRAM

## 2025-05-19 PROCEDURE — 85025 COMPLETE CBC W/AUTO DIFF WBC: CPT

## 2025-05-19 PROCEDURE — 97535 SELF CARE MNGMENT TRAINING: CPT

## 2025-05-19 PROCEDURE — 2700000000 HC OXYGEN THERAPY PER DAY

## 2025-05-19 PROCEDURE — 6370000000 HC RX 637 (ALT 250 FOR IP): Performed by: NURSE PRACTITIONER

## 2025-05-19 PROCEDURE — 97165 OT EVAL LOW COMPLEX 30 MIN: CPT

## 2025-05-19 PROCEDURE — 97530 THERAPEUTIC ACTIVITIES: CPT

## 2025-05-19 PROCEDURE — 2500000003 HC RX 250 WO HCPCS: Performed by: STUDENT IN AN ORGANIZED HEALTH CARE EDUCATION/TRAINING PROGRAM

## 2025-05-19 PROCEDURE — 36415 COLL VENOUS BLD VENIPUNCTURE: CPT

## 2025-05-19 PROCEDURE — 6370000000 HC RX 637 (ALT 250 FOR IP): Performed by: INTERNAL MEDICINE

## 2025-05-19 PROCEDURE — 84100 ASSAY OF PHOSPHORUS: CPT

## 2025-05-19 RX ORDER — DIPHENHYDRAMINE HCL 25 MG
25 TABLET ORAL EVERY 6 HOURS PRN
Status: DISCONTINUED | OUTPATIENT
Start: 2025-05-19 | End: 2025-05-19 | Stop reason: HOSPADM

## 2025-05-19 RX ORDER — MIDODRINE HYDROCHLORIDE 5 MG/1
5 TABLET ORAL 2 TIMES DAILY WITH MEALS
Qty: 90 TABLET | Refills: 3 | Status: SHIPPED | OUTPATIENT
Start: 2025-05-19

## 2025-05-19 RX ORDER — POLYETHYLENE GLYCOL 3350 17 G/17G
17 POWDER, FOR SOLUTION ORAL DAILY
Qty: 30 PACKET | Refills: 0 | Status: SHIPPED | OUTPATIENT
Start: 2025-05-20 | End: 2025-06-19

## 2025-05-19 RX ADMIN — POLYETHYLENE GLYCOL 3350 17 G: 17 POWDER, FOR SOLUTION ORAL at 11:11

## 2025-05-19 RX ADMIN — METHOCARBAMOL 500 MG: 500 TABLET ORAL at 11:11

## 2025-05-19 RX ADMIN — SUCRALFATE 1 G: 1 TABLET ORAL at 16:23

## 2025-05-19 RX ADMIN — INSULIN LISPRO 4 UNITS: 100 INJECTION, SOLUTION INTRAVENOUS; SUBCUTANEOUS at 16:22

## 2025-05-19 RX ADMIN — FERROUS SULFATE TAB 325 MG (65 MG ELEMENTAL FE) 325 MG: 325 (65 FE) TAB at 11:11

## 2025-05-19 RX ADMIN — HEPARIN SODIUM 5000 UNITS: 5000 INJECTION INTRAVENOUS; SUBCUTANEOUS at 06:24

## 2025-05-19 RX ADMIN — INSULIN LISPRO 4 UNITS: 100 INJECTION, SOLUTION INTRAVENOUS; SUBCUTANEOUS at 11:17

## 2025-05-19 RX ADMIN — PANTOPRAZOLE SODIUM 40 MG: 40 TABLET, DELAYED RELEASE ORAL at 06:24

## 2025-05-19 RX ADMIN — SODIUM CHLORIDE, PRESERVATIVE FREE 10 ML: 5 INJECTION INTRAVENOUS at 11:12

## 2025-05-19 RX ADMIN — METHOCARBAMOL 500 MG: 500 TABLET ORAL at 16:23

## 2025-05-19 RX ADMIN — SUCRALFATE 1 G: 1 TABLET ORAL at 11:11

## 2025-05-19 RX ADMIN — MIDODRINE HYDROCHLORIDE 5 MG: 5 TABLET ORAL at 11:11

## 2025-05-19 RX ADMIN — INSULIN LISPRO 12 UNITS: 100 INJECTION, SOLUTION INTRAVENOUS; SUBCUTANEOUS at 06:26

## 2025-05-19 RX ADMIN — ASPIRIN 81 MG CHEWABLE TABLET 81 MG: 81 TABLET CHEWABLE at 11:11

## 2025-05-19 RX ADMIN — DIPHENHYDRAMINE HYDROCHLORIDE 25 MG: 25 TABLET ORAL at 13:28

## 2025-05-19 RX ADMIN — GABAPENTIN 100 MG: 100 CAPSULE ORAL at 16:23

## 2025-05-19 RX ADMIN — METHOCARBAMOL 500 MG: 500 TABLET ORAL at 13:28

## 2025-05-19 RX ADMIN — SUCRALFATE 1 G: 1 TABLET ORAL at 06:24

## 2025-05-19 RX ADMIN — GABAPENTIN 100 MG: 100 CAPSULE ORAL at 11:11

## 2025-05-19 RX ADMIN — SENNOSIDES AND DOCUSATE SODIUM 1 TABLET: 50; 8.6 TABLET ORAL at 11:11

## 2025-05-19 RX ADMIN — HEPARIN SODIUM 5000 UNITS: 5000 INJECTION INTRAVENOUS; SUBCUTANEOUS at 13:28

## 2025-05-19 RX ADMIN — OXYCODONE AND ACETAMINOPHEN 1 TABLET: 325; 10 TABLET ORAL at 06:26

## 2025-05-19 RX ADMIN — OXYCODONE AND ACETAMINOPHEN 1 TABLET: 325; 10 TABLET ORAL at 11:11

## 2025-05-19 RX ADMIN — OXYCODONE AND ACETAMINOPHEN 1 TABLET: 325; 10 TABLET ORAL at 16:25

## 2025-05-19 ASSESSMENT — PAIN DESCRIPTION - LOCATION
LOCATION: LEG

## 2025-05-19 ASSESSMENT — PAIN DESCRIPTION - ORIENTATION
ORIENTATION: LEFT
ORIENTATION: LEFT

## 2025-05-19 ASSESSMENT — PAIN SCALES - GENERAL
PAINLEVEL_OUTOF10: 8
PAINLEVEL_OUTOF10: 9
PAINLEVEL_OUTOF10: 8

## 2025-05-19 ASSESSMENT — PAIN DESCRIPTION - DESCRIPTORS
DESCRIPTORS: ACHING

## 2025-05-19 NOTE — PROGRESS NOTES
HOSPITALIST PROGRESS NOTE    Patient's name: Michelle Nettles  : 1985  Admission date: 2025  Date of service: 2025   Primary care physician: Rogers Rodríguez MD    Assessment   Patient admitted on 2025     Michelle Nettles is a 39 y.o. female patient of Rogers Rodríguez MD with history of chronic anemia, ESRD on hemodialysis, uncontrolled type 1 insulin-dependent diabetes mellitus, hypertension, presented to [facility] on 2025  after she was advised to come to the ER by dialysis center after her hemoglobin was found to be low.  While in the ER patient hemoglobin found to be 6.6 and patient was transfused unit of PRBC.  Patient has needed multiple transfusions in the past, has had colonoscopy as well as EGD with no acute abnormality.  Patient had bleeding scan on 3/17 which was negative  Patient while in the ER also complained of having a fall and left knee pain.  Patient had CT left knee that showed acute traumatic nondisplaced obliquely oriented intra-articular fracture involving the medial femoral condyle.  Orthopedic surgery is consulted patient is admitted for further management     Left medial femoral condyle fracture status post left medial femoral condyle open reduction internal fixation on   Symptomatic management for pain  PT/OT once cleared by orthopedic surgery     Acute on chronic anemia  Patient has had EGD/colonoscopy/, negative Meckel scan.  Patient was supposed to follow-up with GI as outpatient for.  Endoscopy  Patient was transfused 1 unit of PRBC while in the ER.  Monitor H&H  Continue outpatient follow-up with GI.     Uncontrolled type 1 insulin-dependent diabetes mellitus:  Sliding scale insulin.  Lantus     Hypertension  Continue home blood pressure medications     ESRD  Nephrology consult     Chronic hypoxic respiratory failure  On baseline 5 L oxygen.         Follow labs   DVT prophylaxis Heparin  Please see orders for 
  OT consult received and appreciated. Chart reviewed and discussed case with RN. Pt on hold at time of attempt d/t hypotension. Will evaluate at a later time.    Betsy Alvarez, OTR/L # [id number]     
  Pulse ox was ____88___ % on room air at rest.  Ambulated patient on room air.    Oxygen saturation was ___87____ % on room air while ambulating. (lowest saturation reached)  Oxygen applied.    Recovery pulse ox was ____95___% on __5__ liters of oxygen while ambulating.  Last edited by Kathleen Pepe, MSW, LSW on 05/19/25 at 9887   
4 Eyes Skin Assessment     NAME:  Michelle Nettles  YOB: 1985  MEDICAL RECORD NUMBER:  [id number]    The patient is being assessed for  Admission    I agree that at least one RN has performed a thorough Head to Toe Skin Assessment on the patient. ALL assessment sites listed below have been assessed.      Areas assessed by both nurses:    Head, Face, Ears, Shoulders, Back, Chest, Arms, Elbows, Hands, Sacrum. Buttock, Coccyx, Ischium, and Legs. Feet and Heels        Does the Patient have a Wound? No noted wound(s)       Edgar Prevention initiated by RN: Yes  Wound Care Orders initiated by RN: No    Pressure Injury (Stage 3,4, Unstageable, DTI, NWPT, and Complex wounds) if present, place Wound referral order by RN under : No    New Ostomies, if present place, Ostomy referral order under : No     Nurse 1 eSignature: Electronically signed by Cony Stark RN on 5/16/25 at 12:27 AM EDT    **SHARE this note so that the co-signing nurse can place an eSignature**    Nurse 2 eSignature: {Esignature:[id number]}   
Confirmed with admitting physician that patient is ok to be downgraded to general floor. Called beds to let them know that patient is downgraded. Awaiting bed assignment  
Consult called to Omar  
DR Heredia notified of abnormal labs creatinine 8.5 and Glucose 618.  
Department of Orthopedic Surgery  Resident Progress Note    Patient seen and examined at bedside this morning.  Patient was on the phone throughout our conversation and somewhat distracted but able to cooperate with exam.  She currently denies any new onset numbness, tingling, or paresthesias.  Denies any other orthopedic complaints this time.  She did have questions about her recovery which were discussed in detail with her.    VITALS:  /63   Pulse 87   Temp 97.9 °F (36.6 °C)   Resp 18   Ht 1.499 m (4' 11\")   Wt 68 kg (150 lb)   SpO2 100%   BMI 30.30 kg/m²     General: Alert and oriented x 3    MUSCULOSKELETAL:   left lower extremity:  Dressings are clean dry and intact  Compartments are soft compressible  Motor intact ankle dorsiflexion, plantarflexion, EHL  Sensation intact to sural, saphenous, tibial, peroneal nerve distributions  Distal extremities are warm and well-perfused  Capillary refill <3 seconds    CBC:   Lab Results   Component Value Date/Time    WBC 5.8 05/17/2025 06:09 AM    HGB 7.5 05/17/2025 06:09 AM    HCT 22.3 05/17/2025 06:09 AM     05/17/2025 06:09 AM     PT/INR:    Lab Results   Component Value Date/Time    PROTIME 11.7 05/15/2025 09:30 AM    INR 1.1 05/15/2025 09:30 AM       ASSESSMENT  S/P left knee medial condyle ORIF 5/16  S/P left ankle ORIF 3/7    PLAN    Nonweightbearing left lower extremity  Sitting in DVT prophylaxis today, aspirin 81 twice daily, early range of motion  Hinged knee brace ordered for left knee  Keep dressings clean and dry, reinforce as needed  Complete 24-hour postoperative antibiotic protocol  Multimodal pain control, wean as able  We will plan for outpatient follow-up 2 weeks from date of surgery for reevaluation  Patient stable for discharge from an orthopedic perspective, orthopedics will continue to follow      Electronically signed by Andres Sparks DO on 5/17/2025 at 8:00 AM    
Department of Orthopedic Surgery  Resident Progress Note    Patient seen and examined at bedside this morning.  She is resting comfortably asleep upon entry to the room.  She states that she is having some pain in her left knee but this generally doing well.  She has any new onset numbness, tingling, or paresthesias.  She denies any other orthopedic complaints at this time.    VITALS:  BP (!) 80/42   Pulse 87   Temp 97.7 °F (36.5 °C) (Temporal)   Resp 18   Ht 1.499 m (4' 11\")   Wt 69.9 kg (154 lb 1.6 oz)   SpO2 100%   BMI 31.12 kg/m²     General: Alert and oriented x 3    MUSCULOSKELETAL:   left lower extremity:  Dressings are clean dry and intact with hinged knee brace in place  Compartments are soft compressible  Motor intact to ankle dorsiflexion, plantarflexion, EHL; range of motion somewhat limited secondary to previous history of left ankle fracture  Sensation intact to sural, saphenous, tibial, peroneal nerve distributions  Distal extremities are warm and well-perfused  Capillary refill <3 seconds    CBC:   Lab Results   Component Value Date/Time    WBC 4.3 05/18/2025 05:51 AM    HGB 7.6 05/18/2025 05:51 AM    HCT 23.4 05/18/2025 05:51 AM     05/18/2025 05:51 AM     PT/INR:    Lab Results   Component Value Date/Time    PROTIME 11.7 05/15/2025 09:30 AM    INR 1.1 05/15/2025 09:30 AM       ASSESSMENT  S/P left knee medial condyle ORIF 5/16  S/P left ankle ORIF 3/7    PLAN    Nonweightbearing left lower extremity  Recommend DVT prophylaxis, aspirin 81 twice daily, early range of motion  Hinged knee brace in place over the left lower extremity, patient states is much more comfortable  Keep dressings clean and dry, reinforce as needed  Multimodal pain control, wean as able  We will plan for outpatient follow-up 2 weeks from date of surgery for reevaluation  Patient stable for discharge from an orthopedic perspective, orthopedics will sign off at this time      Electronically signed by Andres Sparks, 
Dialysis called regarding FBSC reading RR high, spoke with Mina he will put orders in for insulin.  
Dr Munoz notified & present at bedside of creatinine 8.5, Glucose 618,.   
ED TO INPATIENT SBAR HANDOFF    Patient Name: Michelle Nettles   Preferred Name: Michelle  : 1985  39 y.o.   Code Status Order: Full Code  Telemetry Order: Yes  C-SSRS:    Sitter no  NA  Restraints:       Situation  Chief Complaint   Patient presents with    HGB 6.6     Dialysis yesterday, nurse called today to come in for hemoglobin 6.6. Dialysis      Brief Description of Patient's Condition: Stable, Dialysis pt, sent over for low HgB 6.6, got x1 unit HgB 7.6, Hand LLE pain, has fx, getting surgery tomorrow. NPO midnight  Mental Status: oriented and alert    Background  Allergies:   Allergies   Allergen Reactions    Vancomycin Shortness Of Breath and Itching       Assessment  Vitals/MEWS:    Level of Consciousness: Alert (0)   Vitals:    05/15/25 0100 05/15/25 0845 05/15/25 0930 05/15/25 1141   BP: (!) 149/77 (!) 158/80 (!) 152/77    Pulse: 100  (!) 105    Resp: 16  15 16   Temp:  98.3 °F (36.8 °C)     TempSrc:  Oral     SpO2: 100% 99%     Weight:       Height:         Cardiac Rhythm:    Deterioration Index (DI): Deterioration Index: 23.24  Deterioration Index (DI) Interventions Performed:    O2 Flow Rate: O2 Flow Rate (L/min): 5 L/min  O2 Device: O2 Device: None (Room air)    Active Central Lines:                          Active Wounds:    Active Rolle's:      Recommendation  Patient Belongings:  With Pt  Additional Comments: x@ Ivs to RUE, fistula to LUE  If any further questions, please call Sending RN at [phone]  Opportunity for questions and clarification were provided to (name of person notified and time): [facility]        Electronically signed by: Electronically signed by Kamilla Beckett RN on 5/15/2025 at 4:04 PM     
Immobilizer to left leg.  
Message sent to Dr. Li, pt requesting benadryl for itching from percocet.   
Notified  pt bp 85/46  
Notified lab of stat bmp ordered    
Occupational Therapy  Occupational Therapy    Date:2025  Patient Name: Michelle Nettles  MRN: [id number]  : 1985  Room: [address] [place] [address]-A     OT orders received and chart reviewed. OT eval on hold at this time, pt went to OR with ortho 25, no notes available, awaiting weightbearing status and activity order from ortho surgery.     OT will follow and re-attempt eval as appropriate.     DORIAN Morgan, OTR/L [id number]    
Ortho LEAH notified for \"pain pill\" per patient request and diet order.  
Perfect served Dr Enriquez in regarding diet  
Physical Therapy    Date: 2025       Patient Name: Michelle Nettles  : 1985      MRN: [id number]    Physical therapy order received and chart reviewed. PT evaluation held this date per RN / hypotension.  Will follow up as appropriate.     Jania Spears PT, DPT  [id number]       
Physical Therapy    PT evaluation orders received and chart review completed. Will await WB recommendations/Op note from ortho prior to PT evaluation.     Will follow and re-attempt as appropriate. Thank you.    Sadia Dawson PT, DPT  [id number]      
Pt stated to Dr. Mishra, this RN, and line room RN Bela that she is 100% sure she is not pregnant and waived a pregnancy test.  Pt states she does not make urine.    
Pt verified using 2 Identifiers and ID band with OR staff prior to acceptance to PACU/Phase II care.   
Spoke with Kathleen Pepe due to the patient requesting to be transferred to home address instead of original arrangement made by above .  Spoke to [facility]  and patient will be transported to [address] [facility],  time is at 1700.  
[facility]  Nephrology Progress Note    Patient's Name: Michelle Nettles    History of Present Illness from 5/15 consult note:    \"A full consult is deferred as patient is well-known to our service.  Briefly, Michelle Nettles is a 39 y.o. female with a past medical history of CHF, anemia, ESRD, and hypertension.  She presented to the ED on 5/14 reportedly for concerns of anemia.  Vital signs on 5/14 include temperature 97.5, respirations 18, pulse 104, /71, and she was 100% SpO2.  Lab data on 5/14 includes BUN 23, creatinine 7, albumin 3.3, and hemoglobin 6.4 g/dL.  She underwent an x-ray of her left knee on 5/15 which showed cortical irregularity to the distal left femur in the trochlear groove region with possible lucency extending towards the medial aspect of the distal left femur, mild left knee osteoarthritis.  She had a chest x-ray on 5/15 which showed cardiomegaly with decreased opacification within the mid and lower lungs.  CT left knee on 5/15 showed acute traumatic nondisplaced obliquely oriented intra-articular fracture involving medial femoral condyle.  Orthopedic surgery has been consulted to see the patient and she is for possible OR.  Nephrology has been consulted to see the patient for concerns of ESRD.  Patient is known to our service and dialyzes as an outpatient at [facility] via left arm AV fistula.  At present, patient was seen and examined.  She notes that she had a fall this week.  She reports shortness of breath.\"    Subjective:    5/16/2025: Patient was seen and examined.  She is awake, no acute distress.  She had HD yesterday with 2.2 L net removed.  S/p OR today.    PMH:    Past Medical History:   Diagnosis Date    JOLLY (acute kidney injury) 04/05/2016    Anemia     AVF (arteriovenous fistula) 02/19/2018    let arm    Cellulitis, face     CHF (congestive heart failure) (HCC)     Chronic kidney disease     Chronic respiratory failure with hypoxia (HCC)     
Xrays done in pacu.  
sit: Independent   Sit to supine: Independent   Scooting: Independent    Transfers Sit to stand: Dariela  Stand to sit: Dariela  Stand pivot: NT  Sit to stand: Supervision  Stand to sit: Supervision  Stand pivot: Supervision with WW   Ambulation   1 sidehop with WW with Dariela  >5 feet with WW with Supervision   WC Mobility NT  Self propel WC >100 feet Mod Independent    Stair negotiation: ascended and descended NT  NA   ROM BUE: Refer to OT note  BLE: LLE limited by hinged knee brace set to 0-30°     Strength BUE: Refer to OT note  BLE: R WFL     Balance Sitting EOB: SBA  Dynamic Standing: Dariela with WW  Sitting EOB: Independent   Dynamic Standing: Supervision with WW     Pt is A & O x: 4 to person, place, month/year, and situation.   Sensation: Denies numbness and tingling of extremities.   Edema: Unremarkable    Patient education  Pt educated on NWB LLE.    Patient response to education:   Pt verbalized understanding Pt demonstrated skill Pt requires further education in this area   Yes With assist Reinforce     ASSESSMENT:    Conditions Requiring Skilled Therapeutic Intervention:    [x]Decreased strength     [x]Decreased ROM  [x]Decreased functional mobility  [x]Decreased balance   [x]Decreased endurance   []Decreased posture  []Decreased sensation  []Decreased coordination   []Decreased vision  []Decreased safety awareness   [x]Increased pain       Comments:    Pt was in bed upon room entry; agreeable to PT evaluation. NWB LLE was reviewed. Pt completed all mobility noted above. Light assist provided for all mobility. Assist required for LLE advancement to EOB due to pain and heaviness. Sitting balance was good. Pt stood to WW and was able to maintain NWB when standing. Pt took 1 sidehop with WW. No LOB occurred. Pt reported increased pain with LLE in dependent position. Pt was assisted back to bed after activity. Pt was positioned comfortably. Pt states she wants to go home once discharged from hospital. 24/7 
Instruction/training on energy conservation/work simplification & LLE pain management strategies for completion of ADLs.   Skilled positioning/alignment-  Therapist facilitated proper positioning/alignment throughout session to maintain skin/joint integrity & proper body mechanics/NWB LLE.    Rehab Potential: good  for established goals     LTG: maximize independence with ADLs to return to PLOF    Patient and/or family were instructed on functional diagnosis, prognosis/goals and OT plan of care. Demonstrated good understanding.     Eval Complexity: Low  History: Expanded chart review of medical records and additional review of physical, cognitive, or psychosocial history related to current functional performance  Exam: 3+ performance deficits  Assistance/Modification: Min/mod assistance or modifications required to perform tasks. May have comorbidities that affect occupational performance.    Time In: 11:44a  Time Out: 12:07p  Total Treatment Time: 8 minutes    Min Units   OT Eval Low 70798  x     OT Eval Medium 23027      OT Eval High 20499      OT Re-Eval 27262       Therapeutic Ex 38581       Therapeutic Activities 03527       ADL/Self Care 46741  8 1    Orthotic Management 72548       Splint Fitting/Training 24161     Manual 22805     Neuro Re-Ed 95627       Non-Billable Time          Evaluation time additionally includes thorough review of current medical information, gathering information on PMH/social history & PLOF, administration/interpretation of standardized testing/informal observation of tasks, assessment of data, consultation within the interdisciplinary team, & development of POC/goals.            Dia Myers OTR/L; [id number]            
alignment         FLUORO FOR SURGICAL PROCEDURES   Final Result   Intraprocedural fluoroscopic spot images as above.  See separate procedure   report for more information.         XR ANKLE LEFT (MIN 3 VIEWS)   Final Result   Hardware transfixing the distal tibia and fibula. Osseous structures are in   alignment.         XR CHEST PORTABLE   Final Result   Cardiomegaly with decreased opacifications in the mid and lower lungs from   prior improved or decreased edema pattern favored without consolidation   development or large effusion.         CT KNEE LEFT WO CONTRAST   Final Result   1. Acute traumatic nondisplaced obliquely oriented intra-articular fracture   involving medial femoral condyle.   2. Thin curvilinear focus of sclerosis to the lateral femoral condyle could   reflect acute traumatic minimally impacted fracture.   3. Large lipohemarthrosis.   4. Mild degenerative changes to the medial compartment.   5. Mild subcutaneous edema.         XR KNEE LEFT (3 VIEWS)   Final Result   1.  There is a cortical irregularity to the distal left femur in the   trochlear groove region with possible lucency extending towards the medial   aspect of the distal left femur.      (Fracture should be excluded.)      2.  Mild left knee osteoarthritis in the medial and patellofemoral   compartments.  There does appear to be a joint effusion.      3.  Heavy vascular calcifications in the soft tissues.      RECOMMENDATION:   Recommend CT scan of the left knee for further evaluation.             Hospital Medications  Current Facility-Administered Medications   Medication Dose Route Frequency Provider Last Rate Last Admin    midodrine (PROAMATINE) tablet 5 mg  5 mg Oral BID  Blas Enriquez MD   5 mg at 05/18/25 0920    albuterol (PROVENTIL) (2.5 MG/3ML) 0.083% nebulizer solution 2.5 mg  2.5 mg Nebulization Q4H PRN Blas Enriquez MD        [Held by provider] amLODIPine (NORVASC) tablet 10 mg  10 mg Oral Daily Blas Enriquez MD   10 
MD Blas   325 mg at 05/16/25 1045    gabapentin (NEURONTIN) capsule 100 mg  100 mg Oral BID Blas Enriquez MD   100 mg at 05/15/25 1758    pantoprazole (PROTONIX) tablet 40 mg  40 mg Oral QAM AC Blas Enriquez MD   40 mg at 05/16/25 0626    sucralfate (CARAFATE) tablet 1 g  1 g Oral 4x Daily AC & HS Blas Enriquez MD   1 g at 05/16/25 1045    sodium chloride flush 0.9 % injection 5-40 mL  5-40 mL IntraVENous 2 times per day Blas Enriquez MD   10 mL at 05/15/25 2101    sodium chloride flush 0.9 % injection 5-40 mL  5-40 mL IntraVENous PRN Blas Enriquez MD        0.9 % sodium chloride infusion   IntraVENous PRN Blas Enriquez MD        heparin (porcine) injection 5,000 Units  5,000 Units SubCUTAneous 3 times per day Blas Enriquez MD        ondansetron (ZOFRAN-ODT) disintegrating tablet 4 mg  4 mg Oral Q8H PRN Blas Enriquez MD        Or    ondansetron (ZOFRAN) injection 4 mg  4 mg IntraVENous Q6H PRN Blas Enriquez MD        melatonin tablet 3 mg  3 mg Oral Nightly Blas Enriquez MD        polyethylene glycol (GLYCOLAX) packet 17 g  17 g Oral Daily Blas Enriquez MD        sennosides-docusate sodium (SENOKOT-S) 8.6-50 MG tablet 1 tablet  1 tablet Oral BID Blas Enriquez MD   1 tablet at 05/16/25 1045    magnesium hydroxide (MILK OF MAGNESIA) 400 MG/5ML suspension 30 mL  30 mL Oral Daily PRN Blas Enriquez MD        bisacodyl (DULCOLAX) suppository 10 mg  10 mg Rectal Daily PRN Blas Enriquez MD        aluminum & magnesium hydroxide-simethicone (MAALOX PLUS) 200-200-20 MG/5ML suspension 30 mL  30 mL Oral Q6H PRN Blas Enriquez MD        acetaminophen (TYLENOL) tablet 650 mg  650 mg Oral Q6H PRN Blas Enriquez MD        Or    acetaminophen (TYLENOL) suppository 650 mg  650 mg Rectal Q6H PRN Blas Enriquez MD        HYDROmorphone (DILAUDID) injection 0.25 mg  0.25 mg IntraVENous Q4H PRN Blas Enriquez MD   0.25 mg at 05/16/25 1045    glucose chewable tablet 16 g  4 tablet Oral PRN Blas Enriquez, 
Pressure Monitoring (BLOOD PRESSURE CUFF) MISC Dx:  Hypertension with labile blood pressure. Check daily. Automated arm cuff (Patient not taking: Reported on 5/15/2025) 1 each 0    insulin lispro, 1 Unit Dial, (HUMALOG KWIKPEN) 100 UNIT/ML SOPN Inject 3 Units into the skin 3 times daily (before meals) *Plus Sliding Scale* max daily dose 50u 15 mL 1    Insulin Disposable Pump (OMNIPOD 5 [id number] PODS GEN 5) MISC CHANGE pod every THREE DAYS as directed. (Patient not taking: Reported on 5/15/2025) 30 each 3    HYDROcodone-acetaminophen (NORCO) 5-325 MG per tablet Take 1 tablet by mouth every 6 hours as needed for Pain.      insulin glargine (LANTUS) 100 UNIT/ML injection vial Inject 9 Units into the skin every morning 10 mL 3    amLODIPine (NORVASC) 5 MG tablet Take 2 tablets by mouth daily 60 tablet 3    tiZANidine (ZANAFLEX) 4 MG tablet Take 1 tablet by mouth daily as needed (before PT OT evaluation for pain relief) 7 tablet 0    acetaminophen (TYLENOL) 325 MG tablet Take 2 tablets by mouth every 4 hours as needed for Pain      insulin lispro (HUMALOG,ADMELOG) 100 UNIT/ML SOLN injection vial Inject 3 Units into the skin 3 times daily (with meals)      magnesium hydroxide (MILK OF MAGNESIA) 400 MG/5ML suspension Take 30 mLs by mouth daily as needed for Constipation      diphenhydrAMINE (BENADRYL) 25 MG tablet Take 1 tablet by mouth every 6 hours as needed for Itching      bisacodyl (DULCOLAX) 10 MG suppository Place 1 suppository rectally daily as needed for Constipation      ferrous sulfate (IRON 325) 325 (65 Fe) MG tablet Take 1 tablet by mouth every other day 45 tablet 1    Blood Pressure Monitoring (BLOOD PRESSURE KIT) LIANNE Check BP daily 1 each 0    Misc. Devices (PULSE OXIMETER FOR FINGER) MISC Check HR and pulse ox daily and PRN symptoms (Patient not taking: Reported on 5/15/2025) 1 each 0    vitamin B-12 (CYANOCOBALAMIN) 500 MCG tablet Take 1 tablet by mouth daily 90 tablet 1    Accu-Chek Softclix Lancets MISC 
Diabetes Paternal Aunt          Physical Exam:      Patient Vitals for the past 24 hrs:   BP Temp Temp src Pulse Resp SpO2 Weight   05/18/25 1200 109/64 98.2 °F (36.8 °C) Oral 99 -- 99 % --   05/18/25 1149 -- -- -- -- 18 -- --   05/18/25 0954 -- -- -- -- 20 -- --   05/18/25 0924 (!) 96/58 -- -- -- -- -- --   05/18/25 0745 (!) 85/46 97.4 °F (36.3 °C) Temporal 96 -- 100 % --   05/18/25 0456 -- -- -- -- -- -- 69.9 kg (154 lb 1.6 oz)   05/18/25 0345 (!) 80/42 -- -- -- -- -- --   05/18/25 0344 -- -- -- -- 18 -- --   05/18/25 0322 (!) 83/53 97.7 °F (36.5 °C) Temporal 87 -- 100 % --   05/18/25 0132 -- -- -- -- 18 -- --   05/17/25 2236 (!) 93/55 97.5 °F (36.4 °C) Temporal 97 -- 99 % --   05/17/25 2220 -- -- -- -- 18 -- --   05/17/25 2045 -- -- -- -- 18 -- --   05/17/25 1945 (!) 95/56 98.4 °F (36.9 °C) Temporal (!) 107 -- 100 % --   05/17/25 1725 -- -- -- -- 19 -- --   05/17/25 1509 (!) 104/59 98.2 °F (36.8 °C) Temporal (!) 110 20 100 % --   05/17/25 1455 -- -- -- -- 18 -- --         Intake/Output Summary (Last 24 hours) at 5/18/2025 1410  Last data filed at 5/18/2025 1041  Gross per 24 hour   Intake 470 ml   Output --   Net 470 ml       General: Awake, alert, no acute distress  Lungs: Clear throughout, unlabored  CV: Regular rate and rhythm.  No rub  Abd: Soft, nontender, nondistended.  Active bowel sounds  Skin: Warm and dry.  No rash on exposed extremities  Ext: No edema; immobilizer/dressing noted to LLE  Neuro: Awake, answers questions appropriately    Data:    Recent Labs     05/16/25  1036 05/17/25  0609 05/18/25  0551   WBC 8.4 5.8 4.3*   HGB 8.9* 7.5* 7.6*   HCT 27.2* 22.3* 23.4*   MCV 88.0 85.1 88.3    250 232       Recent Labs     05/16/25  1036 05/17/25  0609 05/18/25  0551   * 135* 136   K 3.7 3.6 3.5   CL 94* 96* 96*   CO2 25 25 26   CREATININE 4.7* 6.7* 4.7*   BUN 14 27* 15   LABGLOM 11* 7* 11*   GLUCOSE 197* 137* 300*   CALCIUM 9.0 9.1 9.1   PHOS 4.6* 4.4 3.8   MG 2.1 2.1 1.9       Vit D, 
History:         Problem Relation Age of Onset    Stroke Mother     Cancer Father     Diabetes Paternal Aunt          Physical Exam:      Patient Vitals for the past 24 hrs:   BP Temp Temp src Pulse Resp SpO2 Weight   05/17/25 1215 (!) 103/59 97 °F (36.1 °C) Temporal 98 18 100 % --   05/17/25 1101 (!) 95/58 96.9 °F (36.1 °C) -- (!) 107 16 -- 65.2 kg (143 lb 11.8 oz)   05/17/25 1002 -- -- -- -- 18 -- --   05/17/25 0230 119/63 97.9 °F (36.6 °C) -- 87 18 100 % --   05/16/25 2342 108/63 97.6 °F (36.4 °C) Temporal 93 -- 100 % --   05/16/25 2029 (!) 120/53 97.4 °F (36.3 °C) Temporal (!) 119 17 99 % --   05/16/25 1547 -- -- -- -- 18 -- --   05/16/25 1510 (!) 146/55 97.9 °F (36.6 °C) Temporal (!) 106 20 100 % --         Intake/Output Summary (Last 24 hours) at 5/17/2025 1348  Last data filed at 5/17/2025 1101  Gross per 24 hour   Intake 300 ml   Output 2500 ml   Net -2200 ml       General: Awake, alert, no acute distress  Lungs: Clear bilaterally upper, diminished to the bases bilaterally.  Unlabored  CV: Regular rate and rhythm.  No rub  Abd: Soft, nontender, nondistended.  Active bowel sounds  Skin: Warm and dry.  No rash on exposed extremities  Ext: No edema; immobilizer/dressing noted to LLE  Neuro: Awake, answers questions appropriately    Data:    Recent Labs     05/15/25  0930 05/16/25  1036 05/17/25  0609   WBC 5.0 8.4 5.8   HGB 7.6* 8.9* 7.5*   HCT 23.2* 27.2* 22.3*   MCV 88.9 88.0 85.1    234 250       Recent Labs     05/15/25  0930 05/16/25  1036 05/17/25  0609   * 135* 135*   K 4.8 3.7 3.6   CL 87* 94* 96*   CO2 20* 25 25   CREATININE 8.5* 4.7* 6.7*   BUN 29* 14 27*   LABGLOM 6* 11* 7*   GLUCOSE 618* 197* 137*   CALCIUM 8.5* 9.0 9.1   PHOS  --  4.6* 4.4   MG  --  2.1 2.1       Vit D, 25-Hydroxy   Date Value Ref Range Status   02/15/2025 26.2 (L) 30.0 - 100.0 ng/mL Final       No results found for: \"PTH\"    Recent Labs     05/15/25  0930 05/16/25  1036 05/17/25  0609   ALT 8 8 <5   AST 12 16 12
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
## 2025-05-19 NOTE — TELEPHONE ENCOUNTER
Timothy from [facility] called stating pating is being discharge and wants to know if you'll follow up on patients PT/OT and nursing [facility].    [phone] Opt1    Last Appointment:  2/4/2025  Future Appointments   Date Time Provider Department Center   6/3/2025  9:45 AM Peyman Germain MD [facility] ORTHO [facility]   6/4/2025 11:45 AM Rod Forman MD [place] [facility]   9/9/2025 12:40 PM Angelina Benitez, APRN - CNP [facility] ENDO [facility]

## 2025-05-19 NOTE — DISCHARGE SUMMARY
and pulse ox daily and PRN symptoms     tiZANidine 4 MG tablet  Commonly known as: ZANAFLEX  Take 1 tablet by mouth daily as needed (before PT OT evaluation for pain relief)     vitamin B-12 500 MCG tablet  Commonly known as: CYANOCOBALAMIN  Take 1 tablet by mouth daily           * This list has 6 medication(s) that are the same as other medications prescribed for you. Read the directions carefully, and ask your doctor or other care provider to review them with you.                STOP taking these medications      Accu-Chek Guide strip  Generic drug: blood glucose test strips     acetaminophen 325 MG tablet  Commonly known as: TYLENOL     amLODIPine 5 MG tablet  Commonly known as: NORVASC     aspirin 81 MG chewable tablet  Replaced by: aspirin 81 MG EC tablet     calcium acetate 667 MG Caps capsule  Commonly known as: PHOSLO     carvedilol 25 MG tablet  Commonly known as: COREG     Daily Bao Tabs     HYDROcodone-acetaminophen 5-325 MG per tablet  Commonly known as: NORCO     sucralfate 1 GM tablet  Commonly known as: CARAFATE               Where to Get Your Medications        These medications were sent to [id number] - [place], [place] - [address] - P [phone] - F [phone] [id number] [facility] [address]      Phone: [phone]   aspirin 81 MG EC tablet  midodrine 5 MG tablet  polyethylene glycol 17 g packet       You can get these medications from any pharmacy    Bring a paper prescription for each of these medications  oxyCODONE-acetaminophen 5-325 MG per tablet           35 minutes was spent in preparing discharge papers, discussing discharge with patient, medication review, etc.    Signed:  Electronically signed by Brii Li MD on 5/19/2025 at 1:21 PM

## 2025-05-20 ENCOUNTER — CARE COORDINATION (OUTPATIENT)
Dept: CARE COORDINATION | Age: 40
End: 2025-05-20

## 2025-05-20 ENCOUNTER — TELEPHONE (OUTPATIENT)
Dept: PRIMARY CARE CLINIC | Age: 40
End: 2025-05-20

## 2025-05-20 NOTE — TELEPHONE ENCOUNTER
Care Transitions Initial Follow Up Call    Outreach made within 2 business days of discharge: Yes    Patient: Michelle Nettles Patient : 1985   MRN: [id number]  Reason for Admission: Anemia  Discharge Date: 25       Spoke with: Left message requesting a call back.    Discharge department/facility: [facility]

## 2025-05-20 NOTE — CARE COORDINATION
Care Transitions Note    Initial Call - Call within 2 business days of discharge: Yes    Attempted to reach patient for transitions of care follow up. Unable to reach patient.    Outreach Attempts:   Pt answered but states that she is not available to complete the call at this time. CTN offered to try back again tomorrow and she was agreeable to this.    CTN contacted [facility] and spoke with Maty who confirms referral was received and they are waiting on orders from the pcp.    Patient: Michelle Nettles    Patient : 1985   MRN: [id number]    Reason for Admission: Acute on chronic anemia   Discharge Date: 25  RURS: Readmission Risk Score: 45.7    Last Discharge Facility       Date Complaint Diagnosis Description Type Department Provider    25 HGB 6.6 Acute on chronic anemia ... ED to Hosp-Admission (Discharged) (ADMITTED) [facility] Brii Li MD; Chin...            Was this an external facility discharge? No    Follow Up Appointment:   Patient does not have a follow up appointment scheduled at time of call.  CTN notes that PCP office has attempted to reach the pt for scheduling.  Future Appointments         Provider Specialty Dept Phone    2025 11:45 AM Rod Forman MD Cardiology [phone]    2025 1:30 PM SCHEDULE, SE [facility] Orthopedic Surgery [phone]    2025 12:40 PM Angelina Benitez, KEMI - CNP Endocrinology [phone]            Plan for follow-up on next business day.      Aixa Johnson RN

## 2025-05-21 ENCOUNTER — TELEPHONE (OUTPATIENT)
Dept: ENDOCRINOLOGY | Age: 40
End: 2025-05-21

## 2025-05-21 ENCOUNTER — PATIENT MESSAGE (OUTPATIENT)
Dept: ENDOCRINOLOGY | Age: 40
End: 2025-05-21

## 2025-05-21 ENCOUNTER — CARE COORDINATION (OUTPATIENT)
Dept: CARE COORDINATION | Age: 40
End: 2025-05-21

## 2025-05-21 ENCOUNTER — TELEPHONE (OUTPATIENT)
Dept: VASCULAR SURGERY | Age: 40
End: 2025-05-21

## 2025-05-21 NOTE — CARE COORDINATION
Care Transitions Note    Initial Call - Call within 2 business days of discharge: Yes    2nd attempt to reach patient for transitions of care follow up. Unable to reach patient.    Outreach Attempts:   Pt answered, but stated that she was not able to talk at this time. The pt was encouraged to return this CTN's call later today when available. Ensured that the pt had this CTN's contact information.    No further outreaches will be attempted.    If no return call by the end of today, CTN will sign off and resolve  the CT program.      Patient: Michelle Nettles    Patient : 1985   MRN: [id number]    Reason for Admission: Acute on chronic anemia   Discharge Date: 25  RURS: Readmission Risk Score: 45.7    Last Discharge Facility       Date Complaint Diagnosis Description Type Department Provider    25 HGB 6.6 Acute on chronic anemia ... ED to Hosp-Admission (Discharged) (ADMITTED) [facility] Brii Li MD; Chin...            Was this an external facility discharge? No    Follow Up Appointment:   Patient does not have a follow up appointment scheduled at time of call.  Office has attempted to reach the pt to schedule.  Future Appointments         Provider Specialty Dept Phone    2025 11:45 AM Rod Forman MD Cardiology [phone]    2025 1:30 PM SCHEDULE, [facility] Orthopedic Surgery [phone]    2025 12:40 PM Angelina Benitez APRN - CNP Endocrinology [phone]            No further follow-up call indicated     Aixa Johnson RN

## 2025-05-21 NOTE — OP NOTE
Operative Note      Patient: Michelle Nettles  YOB: 1985  MRN: [id number]    Date of Procedure: 5/16/2025    Pre-Op Diagnosis Codes:      * Displaced fracture of medial condyle of left femur, initial encounter for closed fracture (HCC) [S72.432A]    Post-Op Diagnosis: Same       Procedure(s):  LEFT MEDIAL FEMORAL CONDYLE OPEN REDUCTION INTERNAL FIXATION    Surgeon(s):  Peyman Germain MD    Assistant:   Resident: Shreyas Baron DO    Anesthesia: General    Estimated Blood Loss (mL): Minimal    Complications: None    Specimens:   * No specimens in log *    Implants:  Implant Name Type Inv. Item Serial No.  Lot No. LRB No. Used Action   SCREW BNE HDLSS SHT THRD 4.5X55 MM 14 MM TITO ST SD YEL NS - [id number]  SCREW BNE HDLSS SHT THRD 4.5X55 MM 14 MM TITO ST SD YEL NS  [employer]  Left 1 Implanted   SCREW BNE HDLSS LNG THRD 4.5X70 MM 28 MM TITO ST SD YEL NS - [id number]  SCREW BNE HDLSS LNG THRD 4.5X70 MM 28 MM TITO ST SD YEL NS  [employer]WD  Left 2 Implanted         Drains: * No LDAs found *    Findings:  Infection Present At Time Of Surgery (PATOS) (choose all levels that have infection present):  No infection present  Other Findings: \"4.5 mm headless compression screws,synthes    Detailed Description of Procedure:   Patient was brought to the operating room in a supine position on a hospital bed.  Patient was transferred to the operating room table by multiple individuals in a safe fashion with anesthesia in control of the patient's C-spine and airway.  Once on the operating table, all points of pressure were identified and well-padded.  A tourniquet was applied to the patient's left upper thigh although not used throughout the case.  The patient's left lower extremity was sterilely prepped and draped in the standard orthopedic fashion.  A timeout was performed indicating the appropriate identification of the patient, the procedure to be performed, and

## 2025-05-21 NOTE — TELEPHONE ENCOUNTER
Spoke with patient. Scheduled left upper extremity fistulogram on Wed, 6-11-25 at 1:30 pm. Rosie at admitting office at 12:30 pm.

## 2025-05-22 DIAGNOSIS — S72.432A: ICD-10-CM

## 2025-05-22 RX ORDER — OXYCODONE AND ACETAMINOPHEN 5; 325 MG/1; MG/1
1 TABLET ORAL EVERY 6 HOURS PRN
Qty: 28 TABLET | Refills: 0 | Status: SHIPPED | OUTPATIENT
Start: 2025-05-22 | End: 2025-05-29

## 2025-05-22 NOTE — TELEPHONE ENCOUNTER
Received call from patient requesting refill of Percocet 5/325 mg at [facility].    Date of Procedure: 5/16/2025     Procedure(s):  LEFT MEDIAL FEMORAL CONDYLE OPEN REDUCTION INTERNAL FIXATION     Surgeon(s):  Peyman Germain MD    Weeks from Surgery: 1    Medication pended and routed to providers for decision and signature.    Future Appointments   Date Time Provider Department Center   6/4/2025 11:45 AM Rod Forman MD [place] Card [facility]   6/4/2025  1:30 PM SCHEDULE, SE ORTHO [facility]   6/6/2025  3:45 PM Rogers Rodríguez MD [facility] DEP   9/9/2025 12:40 PM Angelina Benitez APRN - CNP [facility] ENDO [facility]       Electronically signed by Hilary Dupree RN on 5/22/2025 at 3:25 PM

## 2025-05-26 ENCOUNTER — APPOINTMENT (OUTPATIENT)
Dept: CT IMAGING | Age: 40
DRG: 438 | End: 2025-05-26
Payer: MEDICARE

## 2025-05-26 ENCOUNTER — HOSPITAL ENCOUNTER (INPATIENT)
Age: 40
LOS: 10 days | Discharge: HOME HEALTH | DRG: 438 | End: 2025-06-05
Attending: EMERGENCY MEDICINE | Admitting: STUDENT IN AN ORGANIZED HEALTH CARE EDUCATION/TRAINING PROGRAM
Payer: MEDICARE

## 2025-05-26 DIAGNOSIS — R06.02 SHORTNESS OF BREATH: ICD-10-CM

## 2025-05-26 DIAGNOSIS — E11.10 DIABETIC KETOACIDOSIS WITHOUT COMA ASSOCIATED WITH TYPE 2 DIABETES MELLITUS (HCC): Primary | ICD-10-CM

## 2025-05-26 DIAGNOSIS — N18.6 ESRD (END STAGE RENAL DISEASE) (HCC): ICD-10-CM

## 2025-05-26 DIAGNOSIS — D64.9 ACUTE ON CHRONIC ANEMIA: ICD-10-CM

## 2025-05-26 DIAGNOSIS — S72.432A: ICD-10-CM

## 2025-05-26 DIAGNOSIS — S82.852S CLOSED TRIMALLEOLAR FRACTURE OF LEFT ANKLE, SEQUELA: ICD-10-CM

## 2025-05-26 LAB
ALBUMIN SERPL-MCNC: 3.7 G/DL (ref 3.5–5.2)
ALP SERPL-CCNC: 160 U/L (ref 35–104)
ALT SERPL-CCNC: <5 U/L (ref 0–35)
ANION GAP SERPL CALCULATED.3IONS-SCNC: 13 MMOL/L (ref 7–16)
ANION GAP SERPL CALCULATED.3IONS-SCNC: 23 MMOL/L (ref 7–16)
ANION GAP SERPL CALCULATED.3IONS-SCNC: 38 MMOL/L (ref 7–16)
AST SERPL-CCNC: 16 U/L (ref 0–35)
B PARAP IS1001 DNA NPH QL NAA+NON-PROBE: NOT DETECTED
B PERT DNA SPEC QL NAA+PROBE: NOT DETECTED
B-OH-BUTYR SERPL-MCNC: >4.5 MMOL/L (ref 0.02–0.27)
BASOPHILS # BLD: 0.02 K/UL (ref 0–0.2)
BASOPHILS NFR BLD: 0 % (ref 0–2)
BILIRUB SERPL-MCNC: 0.3 MG/DL (ref 0–1.2)
BNP SERPL-MCNC: ABNORMAL PG/ML (ref 0–125)
BUN SERPL-MCNC: 15 MG/DL (ref 6–20)
BUN SERPL-MCNC: 54 MG/DL (ref 6–20)
BUN SERPL-MCNC: 56 MG/DL (ref 6–20)
C PNEUM DNA NPH QL NAA+NON-PROBE: NOT DETECTED
CALCIUM SERPL-MCNC: 7.8 MG/DL (ref 8.6–10)
CALCIUM SERPL-MCNC: 8.5 MG/DL (ref 8.6–10)
CALCIUM SERPL-MCNC: 8.7 MG/DL (ref 8.6–10)
CHLORIDE SERPL-SCNC: 78 MMOL/L (ref 98–107)
CHLORIDE SERPL-SCNC: 87 MMOL/L (ref 98–107)
CHLORIDE SERPL-SCNC: 92 MMOL/L (ref 98–107)
CO2 SERPL-SCNC: 11 MMOL/L (ref 22–29)
CO2 SERPL-SCNC: 20 MMOL/L (ref 22–29)
CO2 SERPL-SCNC: 25 MMOL/L (ref 22–29)
CREAT SERPL-MCNC: 11.2 MG/DL (ref 0.5–1)
CREAT SERPL-MCNC: 11.2 MG/DL (ref 0.5–1)
CREAT SERPL-MCNC: 4.1 MG/DL (ref 0.5–1)
EOSINOPHIL # BLD: 0.02 K/UL (ref 0.05–0.5)
EOSINOPHILS RELATIVE PERCENT: 0 % (ref 0–6)
ERYTHROCYTE [DISTWIDTH] IN BLOOD BY AUTOMATED COUNT: 16 % (ref 11.5–15)
FLUAV RNA NPH QL NAA+NON-PROBE: NOT DETECTED
FLUBV RNA NPH QL NAA+NON-PROBE: NOT DETECTED
GFR, ESTIMATED: 13 ML/MIN/1.73M2
GFR, ESTIMATED: 4 ML/MIN/1.73M2
GFR, ESTIMATED: 4 ML/MIN/1.73M2
GLUCOSE BLD-MCNC: 121 MG/DL (ref 74–99)
GLUCOSE BLD-MCNC: 141 MG/DL (ref 74–99)
GLUCOSE BLD-MCNC: 152 MG/DL (ref 74–99)
GLUCOSE BLD-MCNC: 174 MG/DL (ref 74–99)
GLUCOSE BLD-MCNC: 193 MG/DL (ref 74–99)
GLUCOSE BLD-MCNC: 231 MG/DL (ref 74–99)
GLUCOSE BLD-MCNC: 255 MG/DL (ref 74–99)
GLUCOSE BLD-MCNC: 347 MG/DL (ref 74–99)
GLUCOSE BLD-MCNC: 94 MG/DL (ref 74–99)
GLUCOSE BLD-MCNC: >500 MG/DL (ref 74–99)
GLUCOSE BLD-MCNC: >500 MG/DL (ref 74–99)
GLUCOSE SERPL-MCNC: 505 MG/DL (ref 74–99)
GLUCOSE SERPL-MCNC: 519 MG/DL (ref 74–99)
GLUCOSE SERPL-MCNC: 565 MG/DL (ref 74–99)
GLUCOSE SERPL-MCNC: 755 MG/DL (ref 74–99)
GLUCOSE SERPL-MCNC: 975 MG/DL (ref 74–99)
HADV DNA NPH QL NAA+NON-PROBE: NOT DETECTED
HBA1C MFR BLD: 7.5 % (ref 4–5.6)
HCOV 229E RNA NPH QL NAA+NON-PROBE: NOT DETECTED
HCOV HKU1 RNA NPH QL NAA+NON-PROBE: NOT DETECTED
HCOV NL63 RNA NPH QL NAA+NON-PROBE: NOT DETECTED
HCOV OC43 RNA NPH QL NAA+NON-PROBE: NOT DETECTED
HCT VFR BLD AUTO: 21.8 % (ref 34–48)
HCT VFR BLD AUTO: 22 % (ref 34–48)
HGB BLD-MCNC: 6.7 G/DL (ref 11.5–15.5)
HGB BLD-MCNC: 7.7 G/DL (ref 11.5–15.5)
HMPV RNA NPH QL NAA+NON-PROBE: NOT DETECTED
HPIV1 RNA NPH QL NAA+NON-PROBE: NOT DETECTED
HPIV2 RNA NPH QL NAA+NON-PROBE: NOT DETECTED
HPIV3 RNA NPH QL NAA+NON-PROBE: NOT DETECTED
HPIV4 RNA NPH QL NAA+NON-PROBE: NOT DETECTED
IMM GRANULOCYTES # BLD AUTO: 0.06 K/UL (ref 0–0.58)
IMM GRANULOCYTES NFR BLD: 1 % (ref 0–5)
LACTATE BLDV-SCNC: 0.8 MMOL/L (ref 0.5–2.2)
LACTATE BLDV-SCNC: 2.3 MMOL/L (ref 0.5–1.9)
LACTATE BLDV-SCNC: 2.5 MMOL/L (ref 0.5–1.9)
LIPASE SERPL-CCNC: 187 U/L (ref 13–60)
LIPASE SERPL-CCNC: 602 U/L (ref 13–60)
LYMPHOCYTES NFR BLD: 0.61 K/UL (ref 1.5–4)
LYMPHOCYTES RELATIVE PERCENT: 7 % (ref 20–42)
M PNEUMO DNA NPH QL NAA+NON-PROBE: NOT DETECTED
MAGNESIUM SERPL-MCNC: 1.8 MG/DL (ref 1.6–2.6)
MAGNESIUM SERPL-MCNC: 2.3 MG/DL (ref 1.6–2.6)
MAGNESIUM SERPL-MCNC: 2.5 MG/DL (ref 1.6–2.6)
MCH RBC QN AUTO: 29.3 PG (ref 26–35)
MCHC RBC AUTO-ENTMCNC: 30.7 G/DL (ref 32–34.5)
MCV RBC AUTO: 95.2 FL (ref 80–99.9)
MONOCYTES NFR BLD: 0.78 K/UL (ref 0.1–0.95)
MONOCYTES NFR BLD: 9 % (ref 2–12)
NEUTROPHILS NFR BLD: 82 % (ref 43–80)
NEUTS SEG NFR BLD: 6.79 K/UL (ref 1.8–7.3)
PH VENOUS: 7.23 (ref 7.35–7.45)
PHOSPHATE SERPL-MCNC: 2.1 MG/DL (ref 2.5–4.5)
PHOSPHATE SERPL-MCNC: 3.3 MG/DL (ref 2.5–4.5)
PLATELET # BLD AUTO: 284 K/UL (ref 130–450)
PMV BLD AUTO: 9.6 FL (ref 7–12)
POTASSIUM SERPL-SCNC: 3.9 MMOL/L (ref 3.5–5.1)
POTASSIUM SERPL-SCNC: 3.9 MMOL/L (ref 3.5–5.1)
POTASSIUM SERPL-SCNC: 4.4 MMOL/L (ref 3.5–5.1)
POTASSIUM SERPL-SCNC: 4.5 MMOL/L (ref 3.5–5.1)
PROCALCITONIN SERPL-MCNC: 1.66 NG/ML (ref 0–0.08)
PROT SERPL-MCNC: 7.5 G/DL (ref 6.4–8.3)
RBC # BLD AUTO: 2.29 M/UL (ref 3.5–5.5)
RSV RNA NPH QL NAA+NON-PROBE: NOT DETECTED
RV+EV RNA NPH QL NAA+NON-PROBE: NOT DETECTED
SARS-COV-2 RNA NPH QL NAA+NON-PROBE: NOT DETECTED
SODIUM SERPL-SCNC: 126 MMOL/L (ref 136–145)
SODIUM SERPL-SCNC: 130 MMOL/L (ref 136–145)
SODIUM SERPL-SCNC: 130 MMOL/L (ref 136–145)
SPECIMEN DESCRIPTION: NORMAL
TROPONIN I SERPL HS-MCNC: 219 NG/L (ref 0–14)
TROPONIN I SERPL HS-MCNC: 243 NG/L (ref 0–14)
TROPONIN I SERPL HS-MCNC: 254 NG/L (ref 0–14)
WBC OTHER # BLD: 8.3 K/UL (ref 4.5–11.5)

## 2025-05-26 PROCEDURE — P9016 RBC LEUKOCYTES REDUCED: HCPCS

## 2025-05-26 PROCEDURE — 80053 COMPREHEN METABOLIC PANEL: CPT

## 2025-05-26 PROCEDURE — 6360000002 HC RX W HCPCS: Performed by: STUDENT IN AN ORGANIZED HEALTH CARE EDUCATION/TRAINING PROGRAM

## 2025-05-26 PROCEDURE — 86901 BLOOD TYPING SEROLOGIC RH(D): CPT

## 2025-05-26 PROCEDURE — 2500000003 HC RX 250 WO HCPCS

## 2025-05-26 PROCEDURE — 85014 HEMATOCRIT: CPT

## 2025-05-26 PROCEDURE — 82962 GLUCOSE BLOOD TEST: CPT

## 2025-05-26 PROCEDURE — 74177 CT ABD & PELVIS W/CONTRAST: CPT

## 2025-05-26 PROCEDURE — 86850 RBC ANTIBODY SCREEN: CPT

## 2025-05-26 PROCEDURE — 6360000002 HC RX W HCPCS: Performed by: NURSE PRACTITIONER

## 2025-05-26 PROCEDURE — 86900 BLOOD TYPING SEROLOGIC ABO: CPT

## 2025-05-26 PROCEDURE — 99223 1ST HOSP IP/OBS HIGH 75: CPT | Performed by: STUDENT IN AN ORGANIZED HEALTH CARE EDUCATION/TRAINING PROGRAM

## 2025-05-26 PROCEDURE — 6360000002 HC RX W HCPCS

## 2025-05-26 PROCEDURE — 83690 ASSAY OF LIPASE: CPT

## 2025-05-26 PROCEDURE — 93005 ELECTROCARDIOGRAM TRACING: CPT

## 2025-05-26 PROCEDURE — 0202U NFCT DS 22 TRGT SARS-COV-2: CPT

## 2025-05-26 PROCEDURE — 83605 ASSAY OF LACTIC ACID: CPT

## 2025-05-26 PROCEDURE — 99285 EMERGENCY DEPT VISIT HI MDM: CPT

## 2025-05-26 PROCEDURE — 5A1D70Z PERFORMANCE OF URINARY FILTRATION, INTERMITTENT, LESS THAN 6 HOURS PER DAY: ICD-10-PCS | Performed by: INTERNAL MEDICINE

## 2025-05-26 PROCEDURE — 82010 KETONE BODYS QUAN: CPT

## 2025-05-26 PROCEDURE — 83735 ASSAY OF MAGNESIUM: CPT

## 2025-05-26 PROCEDURE — 83036 HEMOGLOBIN GLYCOSYLATED A1C: CPT

## 2025-05-26 PROCEDURE — 84484 ASSAY OF TROPONIN QUANT: CPT

## 2025-05-26 PROCEDURE — 85025 COMPLETE CBC W/AUTO DIFF WBC: CPT

## 2025-05-26 PROCEDURE — 90935 HEMODIALYSIS ONE EVALUATION: CPT

## 2025-05-26 PROCEDURE — 84100 ASSAY OF PHOSPHORUS: CPT

## 2025-05-26 PROCEDURE — 84145 PROCALCITONIN (PCT): CPT

## 2025-05-26 PROCEDURE — 6360000004 HC RX CONTRAST MEDICATION: Performed by: RADIOLOGY

## 2025-05-26 PROCEDURE — 36415 COLL VENOUS BLD VENIPUNCTURE: CPT

## 2025-05-26 PROCEDURE — 82800 BLOOD PH: CPT

## 2025-05-26 PROCEDURE — 2580000003 HC RX 258

## 2025-05-26 PROCEDURE — 30233N1 TRANSFUSION OF NONAUTOLOGOUS RED BLOOD CELLS INTO PERIPHERAL VEIN, PERCUTANEOUS APPROACH: ICD-10-PCS | Performed by: INTERNAL MEDICINE

## 2025-05-26 PROCEDURE — 99291 CRITICAL CARE FIRST HOUR: CPT | Performed by: NURSE PRACTITIONER

## 2025-05-26 PROCEDURE — 80048 BASIC METABOLIC PNL TOTAL CA: CPT

## 2025-05-26 PROCEDURE — 84132 ASSAY OF SERUM POTASSIUM: CPT

## 2025-05-26 PROCEDURE — 71275 CT ANGIOGRAPHY CHEST: CPT

## 2025-05-26 PROCEDURE — 82947 ASSAY GLUCOSE BLOOD QUANT: CPT

## 2025-05-26 PROCEDURE — 6370000000 HC RX 637 (ALT 250 FOR IP)

## 2025-05-26 PROCEDURE — 85018 HEMOGLOBIN: CPT

## 2025-05-26 PROCEDURE — 83880 ASSAY OF NATRIURETIC PEPTIDE: CPT

## 2025-05-26 PROCEDURE — 86923 COMPATIBILITY TEST ELECTRIC: CPT

## 2025-05-26 PROCEDURE — 2000000000 HC ICU R&B

## 2025-05-26 RX ORDER — SODIUM CHLORIDE 450 MG/100ML
INJECTION, SOLUTION INTRAVENOUS CONTINUOUS
Status: DISCONTINUED | OUTPATIENT
Start: 2025-05-26 | End: 2025-05-27

## 2025-05-26 RX ORDER — ACETAMINOPHEN 325 MG/1
650 TABLET ORAL EVERY 6 HOURS PRN
Status: DISCONTINUED | OUTPATIENT
Start: 2025-05-26 | End: 2025-06-05 | Stop reason: HOSPADM

## 2025-05-26 RX ORDER — POLYETHYLENE GLYCOL 3350 17 G/17G
17 POWDER, FOR SOLUTION ORAL DAILY PRN
Status: DISCONTINUED | OUTPATIENT
Start: 2025-05-26 | End: 2025-06-05 | Stop reason: HOSPADM

## 2025-05-26 RX ORDER — MAGNESIUM SULFATE IN WATER 40 MG/ML
2000 INJECTION, SOLUTION INTRAVENOUS ONCE
Status: COMPLETED | OUTPATIENT
Start: 2025-05-26 | End: 2025-05-26

## 2025-05-26 RX ORDER — SODIUM CHLORIDE 0.9 % (FLUSH) 0.9 %
5-40 SYRINGE (ML) INJECTION PRN
Status: DISCONTINUED | OUTPATIENT
Start: 2025-05-26 | End: 2025-06-05 | Stop reason: HOSPADM

## 2025-05-26 RX ORDER — DEXTROSE MONOHYDRATE AND SODIUM CHLORIDE 5; .45 G/100ML; G/100ML
INJECTION, SOLUTION INTRAVENOUS CONTINUOUS PRN
Status: DISCONTINUED | OUTPATIENT
Start: 2025-05-26 | End: 2025-05-27

## 2025-05-26 RX ORDER — SODIUM CHLORIDE 9 MG/ML
INJECTION, SOLUTION INTRAVENOUS PRN
Status: DISCONTINUED | OUTPATIENT
Start: 2025-05-26 | End: 2025-06-05 | Stop reason: HOSPADM

## 2025-05-26 RX ORDER — 0.9 % SODIUM CHLORIDE 0.9 %
1000 INTRAVENOUS SOLUTION INTRAVENOUS ONCE
Status: COMPLETED | OUTPATIENT
Start: 2025-05-26 | End: 2025-05-26

## 2025-05-26 RX ORDER — POTASSIUM CHLORIDE 7.45 MG/ML
10 INJECTION INTRAVENOUS PRN
Status: DISCONTINUED | OUTPATIENT
Start: 2025-05-26 | End: 2025-05-27

## 2025-05-26 RX ORDER — DIPHENHYDRAMINE HYDROCHLORIDE 50 MG/ML
25 INJECTION, SOLUTION INTRAMUSCULAR; INTRAVENOUS EVERY 6 HOURS PRN
Status: DISCONTINUED | OUTPATIENT
Start: 2025-05-26 | End: 2025-06-05 | Stop reason: HOSPADM

## 2025-05-26 RX ORDER — ACETAMINOPHEN 650 MG/1
650 SUPPOSITORY RECTAL EVERY 6 HOURS PRN
Status: DISCONTINUED | OUTPATIENT
Start: 2025-05-26 | End: 2025-06-05 | Stop reason: HOSPADM

## 2025-05-26 RX ORDER — IOPAMIDOL 755 MG/ML
75 INJECTION, SOLUTION INTRAVASCULAR
Status: COMPLETED | OUTPATIENT
Start: 2025-05-26 | End: 2025-05-26

## 2025-05-26 RX ORDER — ONDANSETRON 4 MG/1
4 TABLET, ORALLY DISINTEGRATING ORAL EVERY 8 HOURS PRN
Status: DISCONTINUED | OUTPATIENT
Start: 2025-05-26 | End: 2025-06-05 | Stop reason: HOSPADM

## 2025-05-26 RX ORDER — SODIUM CHLORIDE 0.9 % (FLUSH) 0.9 %
5-40 SYRINGE (ML) INJECTION EVERY 12 HOURS SCHEDULED
Status: DISCONTINUED | OUTPATIENT
Start: 2025-05-26 | End: 2025-06-05 | Stop reason: HOSPADM

## 2025-05-26 RX ORDER — ONDANSETRON 2 MG/ML
4 INJECTION INTRAMUSCULAR; INTRAVENOUS EVERY 6 HOURS PRN
Status: DISCONTINUED | OUTPATIENT
Start: 2025-05-26 | End: 2025-06-05 | Stop reason: HOSPADM

## 2025-05-26 RX ORDER — HYDRALAZINE HYDROCHLORIDE 20 MG/ML
10 INJECTION INTRAMUSCULAR; INTRAVENOUS EVERY 6 HOURS PRN
Status: DISCONTINUED | OUTPATIENT
Start: 2025-05-26 | End: 2025-06-05 | Stop reason: HOSPADM

## 2025-05-26 RX ORDER — MAGNESIUM SULFATE IN WATER 40 MG/ML
2000 INJECTION, SOLUTION INTRAVENOUS PRN
Status: DISCONTINUED | OUTPATIENT
Start: 2025-05-26 | End: 2025-05-27

## 2025-05-26 RX ADMIN — SODIUM CHLORIDE 10.8 UNITS/HR: 9 INJECTION, SOLUTION INTRAVENOUS at 12:27

## 2025-05-26 RX ADMIN — POTASSIUM CHLORIDE 10 MEQ: 10 INJECTION, SOLUTION INTRAVENOUS at 11:47

## 2025-05-26 RX ADMIN — DEXTROSE AND SODIUM CHLORIDE: 5; .45 INJECTION, SOLUTION INTRAVENOUS at 17:38

## 2025-05-26 RX ADMIN — IOPAMIDOL 75 ML: 755 INJECTION, SOLUTION INTRAVENOUS at 14:32

## 2025-05-26 RX ADMIN — HYDROMORPHONE HYDROCHLORIDE 0.25 MG: 1 INJECTION, SOLUTION INTRAMUSCULAR; INTRAVENOUS; SUBCUTANEOUS at 21:19

## 2025-05-26 RX ADMIN — DIPHENHYDRAMINE HYDROCHLORIDE 25 MG: 50 INJECTION INTRAMUSCULAR; INTRAVENOUS at 15:10

## 2025-05-26 RX ADMIN — MAGNESIUM SULFATE IN WATER 2000 MG: 40 INJECTION, SOLUTION INTRAVENOUS at 21:58

## 2025-05-26 RX ADMIN — POTASSIUM CHLORIDE 10 MEQ: 10 INJECTION, SOLUTION INTRAVENOUS at 16:36

## 2025-05-26 RX ADMIN — SODIUM CHLORIDE 1000 ML: 9 INJECTION, SOLUTION INTRAVENOUS at 07:44

## 2025-05-26 RX ADMIN — SODIUM CHLORIDE: 0.45 INJECTION, SOLUTION INTRAVENOUS at 10:35

## 2025-05-26 RX ADMIN — SODIUM PHOSPHATE, MONOBASIC, MONOHYDRATE AND SODIUM PHOSPHATE, DIBASIC, ANHYDROUS 15 MMOL: 142; 276 INJECTION, SOLUTION INTRAVENOUS at 22:06

## 2025-05-26 RX ADMIN — POTASSIUM CHLORIDE 10 MEQ: 10 INJECTION, SOLUTION INTRAVENOUS at 17:45

## 2025-05-26 RX ADMIN — POTASSIUM CHLORIDE 10 MEQ: 10 INJECTION, SOLUTION INTRAVENOUS at 12:45

## 2025-05-26 ASSESSMENT — PAIN DESCRIPTION - DESCRIPTORS: DESCRIPTORS: ACHING;DISCOMFORT;SHARP

## 2025-05-26 ASSESSMENT — PAIN SCALES - GENERAL
PAINLEVEL_OUTOF10: 8
PAINLEVEL_OUTOF10: 3

## 2025-05-26 ASSESSMENT — PAIN DESCRIPTION - ORIENTATION: ORIENTATION: LEFT

## 2025-05-26 ASSESSMENT — PAIN DESCRIPTION - LOCATION: LOCATION: KNEE;ANKLE

## 2025-05-26 ASSESSMENT — PAIN - FUNCTIONAL ASSESSMENT: PAIN_FUNCTIONAL_ASSESSMENT: PREVENTS OR INTERFERES WITH ALL ACTIVE AND SOME PASSIVE ACTIVITIES

## 2025-05-26 NOTE — ED PROVIDER NOTES
[facility]  EMERGENCY DEPARTMENT ENCOUNTER        Pt Name: Michelle Nettles  MRN: [id number]  Birthdate 1985  Date of evaluation: 5/26/2025  Provider: Guillermo Howard MD  PCP: Rogers Rodríguez MD  Note Started: 7:55 AM EDT 5/26/25    CHIEF COMPLAINT       Chief Complaint   Patient presents with    Nausea     N/V started yesterday, blood sugar has been high \"because I havent eaten\" non compliant with everything, skipped dialysis sat, last treatment thursday       HISTORY OF PRESENT ILLNESS: 1 or more Elements   History From: Patient    Limitations to history : None  Social Determinants : None    Michelle Nettles is a 39 y.o. female who presents to the Emergency department with complains of nausea, and vomiting.  Patient states that her symptoms began yesterday and have been getting worse.  Patient has had high sugars and has been giver herself insulin. Patient has not been able to eat and drink as much because of the nausea. Patient last received dialysis last Thursday, Patient decided to skip her Saturday session. Patient has been having worsening shortness of breath. She is on 5 liters of oxygen at baseline but did increase her oxygen to 6 liters. Patient has had DKA in the past. She denies any pain, fever, chills, dysuria, hematuria, diarrhea, constipation, bloody stools.    Nursing Notes were all reviewed and agreed with or any disagreements were addressed in the HPI.    ROS:   Pertinent positives and negatives are stated within HPI, all other systems reviewed and are negative.      --------------------------------------------- PAST HISTORY ---------------------------------------------  Past Medical History:  has a past medical history of JOLLY (acute kidney injury), Anemia, AVF (arteriovenous fistula), Cellulitis, face, CHF (congestive heart failure) ([facility]), Chronic kidney disease, Chronic respiratory failure with hypoxia ([facility]), Dialysis AV fistula malfunction, initial

## 2025-05-26 NOTE — CONSENT
Informed Consent for Blood Component Transfusion Note    I have discussed with the patient the rationale for blood component transfusion; its benefits in treating or preventing fatigue, organ damage, or death; and its risk which includes mild transfusion reactions, rare risk of blood borne infection, or more serious but rare reactions. I have discussed the alternatives to transfusion, including the risk and consequences of not receiving transfusion. The patient had an opportunity to ask questions and had agreed to proceed with transfusion of blood components.    Electronically signed by Kalyan Muir MD on 5/26/25 at 8:23 AM EDT

## 2025-05-26 NOTE — ED PROVIDER NOTES
ATTENDING PROVIDER ATTESTATION:     Michelle Nettles presented to the emergency department for evaluation of Nausea (N/V started yesterday, blood sugar has been high \"because I havent eaten\" non compliant with everything, skipped dialysis sat, last treatment thursday)   and was initially evaluated by the Medical Resident. See Original ED Note for H&P and ED course above.     I have reviewed and discussed the case, including pertinent history (medical, surgical, family and social) and exam findings with the Medical Resident assigned to Michelle Nettles.  I have personally performed and/or participated in the history, exam, medical decision making, and procedures and agree with all pertinent clinical information and any additional changes or corrections are noted below in my assessment and plan. I have discussed this patient in detail with the resident, and provided the instruction and education,       I have reviewed my findings and recommendations with the assigned Medical Resident, Michelle Nettles and members of family present at the time of disposition.      I have performed a history and physical examination of this patient and directly supervised the resident caring for this patient              [facility] EMERGENCY DEPARTMENT  EMERGENCY DEPARTMENT ENCOUNTER        Pt Name: Michelle Nettles  MRN: [id number]  Birthdate 1985  Date of evaluation: 5/26/2025  Provider: Kalyan Muir MD  PCP: Rogers Rodríguez MD  Note Started: 7:34 AM EDT 5/26/25    CHIEF COMPLAINT:     Chief Complaint   Patient presents with    Nausea     N/V started yesterday, blood sugar has been high \"because I havent eaten\" non compliant with everything, skipped dialysis sat, last treatment thursday       HISTORY OF PRESENT ILLNESS:       Michelle Nettles is a 39 y.o. female who presents for vomiting, hyperglycemia and missing dialysis. Reports she last had dialysis on

## 2025-05-26 NOTE — H&P
extending towards the medial aspect of the distal left femur. (Fracture should be excluded.) 2.  Mild left knee osteoarthritis in the medial and patellofemoral compartments.  There does appear to be a joint effusion. 3.  Heavy vascular calcifications in the soft tissues. RECOMMENDATION: Recommend CT scan of the left knee for further evaluation.     XR ANKLE LEFT (MIN 3 VIEWS)  Result Date: 5/13/2025  EXAMINATION: THREE XRAY VIEWS OF THE LEFT ANKLE 5/6/2025 10:02 am COMPARISON: 04/28/2025 HISTORY: ORDERING SYSTEM PROVIDED HISTORY: Trimalleolar fracture of ankle, closed, left, initial encounter FINDINGS: Three views of left ankle reveal cortical plate and screws aligning fractures of the distal tibia and fibula.  No hardware complication.  Satisfactory alignment.  Ankle mortise is intact.  Osteopenia the bony structures with vascular calcifications seen throughout the soft tissues.     Stable healing fixed fractures of the distal tibia and fibula.  No hardware complication.     FL SMALL BOWEL FOLLOW THROUGH ONLY  Result Date: 5/2/2025  EXAMINATION: SMALL BOWEL FOLLOW THROUGH SERIES 5/2/2025 TECHNIQUE: Small bowel follow through series was performed with overhead images. Water-soluble contrast was administered per os. The radiology physician assistant, Renu Woodruff PA-C, performed the examination under the supervision of the cosigning radiologist. FLUOROSCOPY DOSE AND TYPE: Radiation Exposure Index: None. COMPARISON: CT abdomen pelvis without contrast March 2, 2025. HISTORY: ORDERING SYSTEM PROVIDED HISTORY: prep for capsule endoscopy, rule out obstructive processes TECHNOLOGIST PROVIDED HISTORY: Reason for exam:->prep for capsule endoscopy, rule out obstructive processes FINDINGS:  image of the abdomen demonstrates a nonspecific, nonobstructed bowel gas pattern.  Changes of fixation across the sacroiliac joints.  Moderate stool burden is demonstrated. There is normal transit time to the terminal ileum.  No

## 2025-05-26 NOTE — FLOWSHEET NOTE
05/26/25 1830   Vital Signs   BP (!) 147/80   Temp 97.6 °F (36.4 °C)   Pulse (!) 107   Respirations 18   Pain Assessment   Pain Assessment None - Denies Pain   Post-Hemodialysis Assessment   Post-Treatment Procedures Blood returned;Access bleeding time < 10 minutes   Machine Disinfection Process Acid/Vinegar Clean;Exterior Machine Disinfection;Heat Disinfect   Rinseback Volume (ml) 300 ml   Blood Volume Processed (Liters) 90 L   Dialyzer Clearance Heavily streaked   Duration of Treatment (minutes) 240 minutes   Hemodialysis Intake (ml) 300 ml   Hemodialysis Output (ml) 2800 ml   NET Removed (ml) 2500   Patient Response to Treatment Tolerated tx well   Bilateral Breath Sounds Diminished   Edema Generalized +1   Patient Disposition Return to room   Observations & Evaluations   Level of Consciousness 0   Heart Rhythm Regular   Respiratory Quality/Effort Unlabored   Handoff   Communication Given Shift Handoff   Handoff Given To Shawna RODRIGUEZ   Handoff Received From Maty RODRIGUEZ   Handoff Communication Face to Face   Time Handoff Given 1830

## 2025-05-26 NOTE — ED NOTES
Radiology Procedure Waiver   Name: Michelle Nettles  : 1985  MRN: [id number]    Date:  25    Time: 12:59 PM EDT    Benefits of immediately proceeding with Radiology exam(s) without pre-testing outweigh the risks or are not indicated as specified below and therefore the following is/are being waived:    [x] Pregnancy test   [] Patients LMP on-time and regular.   [] Patient had Tubal Ligation or has other Contraception Device.   [] Patient  is Menopausal or Premenarcheal.    [] Patient had Full or Partial Hysterectomy.    [] Protocol for Iodine allergy    [] MRI Questionnaire     [x] BUN/Creatinine   [] Patient age w/no hx of renal dysfunction.   [x] Patient on Dialysis.   [] Recent Normal Labs.  Electronically signed by Kalyan Muir MD on 25 at 12:59 PM EDT               Kalyan Muir MD  [phone]

## 2025-05-27 PROBLEM — K85.90 ACUTE PANCREATITIS: Status: ACTIVE | Noted: 2025-05-27

## 2025-05-27 LAB
ABO/RH: NORMAL
ALBUMIN SERPL-MCNC: 3.2 G/DL (ref 3.5–5.2)
ALP SERPL-CCNC: 130 U/L (ref 35–104)
ALT SERPL-CCNC: <5 U/L (ref 0–35)
ANION GAP SERPL CALCULATED.3IONS-SCNC: 11 MMOL/L (ref 7–16)
ANION GAP SERPL CALCULATED.3IONS-SCNC: 12 MMOL/L (ref 7–16)
ANION GAP SERPL CALCULATED.3IONS-SCNC: 13 MMOL/L (ref 7–16)
ANION GAP SERPL CALCULATED.3IONS-SCNC: 13 MMOL/L (ref 7–16)
ANTIBODY SCREEN: NEGATIVE
ARM BAND NUMBER: NORMAL
AST SERPL-CCNC: 14 U/L (ref 0–35)
B-OH-BUTYR SERPL-MCNC: 0.44 MMOL/L (ref 0.02–0.27)
BASOPHILS # BLD: 0.01 K/UL (ref 0–0.2)
BASOPHILS NFR BLD: 0 % (ref 0–2)
BILIRUB SERPL-MCNC: 0.3 MG/DL (ref 0–1.2)
BLOOD BANK BLOOD PRODUCT EXPIRATION DATE: NORMAL
BLOOD BANK DISPENSE STATUS: NORMAL
BLOOD BANK ISBT PRODUCT BLOOD TYPE: 5100
BLOOD BANK PRODUCT CODE: NORMAL
BLOOD BANK SAMPLE EXPIRATION: NORMAL
BLOOD BANK UNIT TYPE AND RH: NORMAL
BPU ID: NORMAL
BUN SERPL-MCNC: 16 MG/DL (ref 6–20)
BUN SERPL-MCNC: 5 MG/DL (ref 6–20)
CALCIUM SERPL-MCNC: 8.1 MG/DL (ref 8.6–10)
CALCIUM SERPL-MCNC: 8.2 MG/DL (ref 8.6–10)
CALCIUM SERPL-MCNC: 8.3 MG/DL (ref 8.6–10)
CALCIUM SERPL-MCNC: 8.6 MG/DL (ref 8.6–10)
CHLORIDE SERPL-SCNC: 94 MMOL/L (ref 98–107)
CHLORIDE SERPL-SCNC: 96 MMOL/L (ref 98–107)
CHLORIDE SERPL-SCNC: 96 MMOL/L (ref 98–107)
CHLORIDE SERPL-SCNC: 97 MMOL/L (ref 98–107)
CHOLEST SERPL-MCNC: 172 MG/DL
CO2 SERPL-SCNC: 25 MMOL/L (ref 22–29)
CO2 SERPL-SCNC: 26 MMOL/L (ref 22–29)
CO2 SERPL-SCNC: 26 MMOL/L (ref 22–29)
CO2 SERPL-SCNC: 27 MMOL/L (ref 22–29)
COMPONENT: NORMAL
CREAT SERPL-MCNC: 2.2 MG/DL (ref 0.5–1)
CREAT SERPL-MCNC: 5 MG/DL (ref 0.5–1)
CREAT SERPL-MCNC: 5.3 MG/DL (ref 0.5–1)
CREAT SERPL-MCNC: 5.4 MG/DL (ref 0.5–1)
CROSSMATCH RESULT: NORMAL
EKG ATRIAL RATE: 115 BPM
EKG P AXIS: 53 DEGREES
EKG P-R INTERVAL: 128 MS
EKG Q-T INTERVAL: 314 MS
EKG QRS DURATION: 66 MS
EKG QTC CALCULATION (BAZETT): 434 MS
EKG R AXIS: 10 DEGREES
EKG T AXIS: 175 DEGREES
EKG VENTRICULAR RATE: 115 BPM
EOSINOPHIL # BLD: 0.32 K/UL (ref 0.05–0.5)
EOSINOPHILS RELATIVE PERCENT: 5 % (ref 0–6)
ERYTHROCYTE [DISTWIDTH] IN BLOOD BY AUTOMATED COUNT: 15 % (ref 11.5–15)
GFR, ESTIMATED: 10 ML/MIN/1.73M2
GFR, ESTIMATED: 10 ML/MIN/1.73M2
GFR, ESTIMATED: 11 ML/MIN/1.73M2
GFR, ESTIMATED: 28 ML/MIN/1.73M2
GLUCOSE BLD-MCNC: 117 MG/DL (ref 74–99)
GLUCOSE BLD-MCNC: 125 MG/DL (ref 74–99)
GLUCOSE BLD-MCNC: 131 MG/DL (ref 74–99)
GLUCOSE BLD-MCNC: 145 MG/DL (ref 74–99)
GLUCOSE BLD-MCNC: 154 MG/DL (ref 74–99)
GLUCOSE BLD-MCNC: 181 MG/DL (ref 74–99)
GLUCOSE BLD-MCNC: 182 MG/DL (ref 74–99)
GLUCOSE BLD-MCNC: 186 MG/DL (ref 74–99)
GLUCOSE BLD-MCNC: 195 MG/DL (ref 74–99)
GLUCOSE BLD-MCNC: 219 MG/DL (ref 74–99)
GLUCOSE BLD-MCNC: 228 MG/DL (ref 74–99)
GLUCOSE BLD-MCNC: 229 MG/DL (ref 74–99)
GLUCOSE BLD-MCNC: 238 MG/DL (ref 74–99)
GLUCOSE BLD-MCNC: 250 MG/DL (ref 74–99)
GLUCOSE BLD-MCNC: 288 MG/DL (ref 74–99)
GLUCOSE SERPL-MCNC: 131 MG/DL (ref 74–99)
GLUCOSE SERPL-MCNC: 132 MG/DL (ref 74–99)
GLUCOSE SERPL-MCNC: 183 MG/DL (ref 74–99)
GLUCOSE SERPL-MCNC: 217 MG/DL (ref 74–99)
HCT VFR BLD AUTO: 22.2 % (ref 34–48)
HDLC SERPL-MCNC: 41 MG/DL
HGB BLD-MCNC: 7.7 G/DL (ref 11.5–15.5)
IMM GRANULOCYTES # BLD AUTO: 0.03 K/UL (ref 0–0.58)
IMM GRANULOCYTES NFR BLD: 1 % (ref 0–5)
LACTATE BLDV-SCNC: 1.9 MMOL/L (ref 0.5–2.2)
LDLC SERPL CALC-MCNC: 103 MG/DL
LIPASE SERPL-CCNC: 403 U/L (ref 13–60)
LYMPHOCYTES NFR BLD: 0.85 K/UL (ref 1.5–4)
LYMPHOCYTES RELATIVE PERCENT: 14 % (ref 20–42)
MAGNESIUM SERPL-MCNC: 2 MG/DL (ref 1.6–2.6)
MAGNESIUM SERPL-MCNC: 2.3 MG/DL (ref 1.6–2.6)
MAGNESIUM SERPL-MCNC: 2.3 MG/DL (ref 1.6–2.6)
MCH RBC QN AUTO: 30.1 PG (ref 26–35)
MCHC RBC AUTO-ENTMCNC: 34.7 G/DL (ref 32–34.5)
MCV RBC AUTO: 86.7 FL (ref 80–99.9)
MONOCYTES NFR BLD: 0.57 K/UL (ref 0.1–0.95)
MONOCYTES NFR BLD: 9 % (ref 2–12)
NEUTROPHILS NFR BLD: 71 % (ref 43–80)
NEUTS SEG NFR BLD: 4.38 K/UL (ref 1.8–7.3)
PHOSPHATE SERPL-MCNC: 1.4 MG/DL (ref 2.5–4.5)
PHOSPHATE SERPL-MCNC: 3.6 MG/DL (ref 2.5–4.5)
PHOSPHATE SERPL-MCNC: 4.2 MG/DL (ref 2.5–4.5)
PLATELET # BLD AUTO: 219 K/UL (ref 130–450)
PMV BLD AUTO: 8.8 FL (ref 7–12)
POTASSIUM SERPL-SCNC: 3.5 MMOL/L (ref 3.5–5.1)
POTASSIUM SERPL-SCNC: 3.8 MMOL/L (ref 3.5–5.1)
POTASSIUM SERPL-SCNC: 3.8 MMOL/L (ref 3.5–5.1)
POTASSIUM SERPL-SCNC: 3.9 MMOL/L (ref 3.5–5.1)
PROT SERPL-MCNC: 6.6 G/DL (ref 6.4–8.3)
RBC # BLD AUTO: 2.56 M/UL (ref 3.5–5.5)
SODIUM SERPL-SCNC: 133 MMOL/L (ref 136–145)
SODIUM SERPL-SCNC: 133 MMOL/L (ref 136–145)
SODIUM SERPL-SCNC: 134 MMOL/L (ref 136–145)
SODIUM SERPL-SCNC: 134 MMOL/L (ref 136–145)
TRANSFUSION STATUS: NORMAL
TRIGL SERPL-MCNC: 141 MG/DL
TROPONIN I SERPL HS-MCNC: 193 NG/L (ref 0–14)
UNIT DIVISION: 0
UNIT ISSUE DATE/TIME: NORMAL
VLDLC SERPL CALC-MCNC: 28 MG/DL
WBC OTHER # BLD: 6.2 K/UL (ref 4.5–11.5)

## 2025-05-27 PROCEDURE — 99222 1ST HOSP IP/OBS MODERATE 55: CPT | Performed by: INTERNAL MEDICINE

## 2025-05-27 PROCEDURE — 80053 COMPREHEN METABOLIC PANEL: CPT

## 2025-05-27 PROCEDURE — 82010 KETONE BODYS QUAN: CPT

## 2025-05-27 PROCEDURE — 99232 SBSQ HOSP IP/OBS MODERATE 35: CPT | Performed by: STUDENT IN AN ORGANIZED HEALTH CARE EDUCATION/TRAINING PROGRAM

## 2025-05-27 PROCEDURE — 84484 ASSAY OF TROPONIN QUANT: CPT

## 2025-05-27 PROCEDURE — 6370000000 HC RX 637 (ALT 250 FOR IP)

## 2025-05-27 PROCEDURE — 6370000000 HC RX 637 (ALT 250 FOR IP): Performed by: INTERNAL MEDICINE

## 2025-05-27 PROCEDURE — 80061 LIPID PANEL: CPT

## 2025-05-27 PROCEDURE — 83735 ASSAY OF MAGNESIUM: CPT

## 2025-05-27 PROCEDURE — 82962 GLUCOSE BLOOD TEST: CPT

## 2025-05-27 PROCEDURE — 2580000003 HC RX 258

## 2025-05-27 PROCEDURE — 80048 BASIC METABOLIC PNL TOTAL CA: CPT

## 2025-05-27 PROCEDURE — 6360000002 HC RX W HCPCS

## 2025-05-27 PROCEDURE — 83690 ASSAY OF LIPASE: CPT

## 2025-05-27 PROCEDURE — 2580000003 HC RX 258: Performed by: NURSE PRACTITIONER

## 2025-05-27 PROCEDURE — 2000000000 HC ICU R&B

## 2025-05-27 PROCEDURE — 84100 ASSAY OF PHOSPHORUS: CPT

## 2025-05-27 PROCEDURE — 93010 ELECTROCARDIOGRAM REPORT: CPT | Performed by: INTERNAL MEDICINE

## 2025-05-27 PROCEDURE — 83605 ASSAY OF LACTIC ACID: CPT

## 2025-05-27 PROCEDURE — 6360000002 HC RX W HCPCS: Performed by: STUDENT IN AN ORGANIZED HEALTH CARE EDUCATION/TRAINING PROGRAM

## 2025-05-27 PROCEDURE — 6360000002 HC RX W HCPCS: Performed by: NURSE PRACTITIONER

## 2025-05-27 PROCEDURE — 2580000003 HC RX 258: Performed by: INTERNAL MEDICINE

## 2025-05-27 PROCEDURE — 2500000003 HC RX 250 WO HCPCS: Performed by: STUDENT IN AN ORGANIZED HEALTH CARE EDUCATION/TRAINING PROGRAM

## 2025-05-27 PROCEDURE — 90935 HEMODIALYSIS ONE EVALUATION: CPT

## 2025-05-27 PROCEDURE — 99291 CRITICAL CARE FIRST HOUR: CPT | Performed by: INTERNAL MEDICINE

## 2025-05-27 PROCEDURE — 85025 COMPLETE CBC W/AUTO DIFF WBC: CPT

## 2025-05-27 RX ORDER — INSULIN LISPRO 100 [IU]/ML
2 INJECTION, SOLUTION INTRAVENOUS; SUBCUTANEOUS
Status: DISCONTINUED | OUTPATIENT
Start: 2025-05-28 | End: 2025-06-04

## 2025-05-27 RX ORDER — GLUCAGON 1 MG/ML
1 KIT INJECTION PRN
Status: DISCONTINUED | OUTPATIENT
Start: 2025-05-27 | End: 2025-06-05 | Stop reason: HOSPADM

## 2025-05-27 RX ORDER — POTASSIUM CHLORIDE 7.45 MG/ML
10 INJECTION INTRAVENOUS
Status: DISCONTINUED | OUTPATIENT
Start: 2025-05-27 | End: 2025-05-27

## 2025-05-27 RX ORDER — INSULIN LISPRO 100 [IU]/ML
0-4 INJECTION, SOLUTION INTRAVENOUS; SUBCUTANEOUS
Status: DISCONTINUED | OUTPATIENT
Start: 2025-05-28 | End: 2025-05-28

## 2025-05-27 RX ORDER — INSULIN LISPRO 100 [IU]/ML
0-8 INJECTION, SOLUTION INTRAVENOUS; SUBCUTANEOUS
Status: DISCONTINUED | OUTPATIENT
Start: 2025-05-27 | End: 2025-05-27

## 2025-05-27 RX ORDER — DEXTROSE MONOHYDRATE 100 MG/ML
INJECTION, SOLUTION INTRAVENOUS CONTINUOUS PRN
Status: DISCONTINUED | OUTPATIENT
Start: 2025-05-27 | End: 2025-06-05 | Stop reason: HOSPADM

## 2025-05-27 RX ORDER — INSULIN GLARGINE 100 [IU]/ML
5 INJECTION, SOLUTION SUBCUTANEOUS DAILY
Status: DISCONTINUED | OUTPATIENT
Start: 2025-05-27 | End: 2025-06-02

## 2025-05-27 RX ADMIN — POTASSIUM CHLORIDE 10 MEQ: 10 INJECTION, SOLUTION INTRAVENOUS at 07:30

## 2025-05-27 RX ADMIN — POTASSIUM CHLORIDE 10 MEQ: 10 INJECTION, SOLUTION INTRAVENOUS at 02:07

## 2025-05-27 RX ADMIN — HYDRALAZINE HYDROCHLORIDE 10 MG: 20 INJECTION INTRAMUSCULAR; INTRAVENOUS at 08:47

## 2025-05-27 RX ADMIN — SODIUM CHLORIDE, PRESERVATIVE FREE 10 ML: 5 INJECTION INTRAVENOUS at 09:28

## 2025-05-27 RX ADMIN — INSULIN LISPRO 4 UNITS: 100 INJECTION, SOLUTION INTRAVENOUS; SUBCUTANEOUS at 17:12

## 2025-05-27 RX ADMIN — DEXTROSE AND SODIUM CHLORIDE: 5; .45 INJECTION, SOLUTION INTRAVENOUS at 01:01

## 2025-05-27 RX ADMIN — HYDROMORPHONE HYDROCHLORIDE 0.25 MG: 1 INJECTION, SOLUTION INTRAMUSCULAR; INTRAVENOUS; SUBCUTANEOUS at 16:52

## 2025-05-27 RX ADMIN — DEXTROSE AND SODIUM CHLORIDE: 5; .45 INJECTION, SOLUTION INTRAVENOUS at 09:10

## 2025-05-27 RX ADMIN — POTASSIUM CHLORIDE 10 MEQ: 10 INJECTION, SOLUTION INTRAVENOUS at 03:30

## 2025-05-27 RX ADMIN — SODIUM CHLORIDE 0.1 UNITS/HR: 9 INJECTION, SOLUTION INTRAVENOUS at 08:02

## 2025-05-27 RX ADMIN — INSULIN GLARGINE 5 UNITS: 100 INJECTION, SOLUTION SUBCUTANEOUS at 10:37

## 2025-05-27 RX ADMIN — SODIUM CHLORIDE 4 UNITS/HR: 9 INJECTION, SOLUTION INTRAVENOUS at 11:05

## 2025-05-27 RX ADMIN — POTASSIUM CHLORIDE 10 MEQ: 10 INJECTION, SOLUTION INTRAVENOUS at 05:12

## 2025-05-27 RX ADMIN — INSULIN LISPRO 2 UNITS: 100 INJECTION, SOLUTION INTRAVENOUS; SUBCUTANEOUS at 20:13

## 2025-05-27 RX ADMIN — INSULIN LISPRO 2 UNITS: 100 INJECTION, SOLUTION INTRAVENOUS; SUBCUTANEOUS at 11:16

## 2025-05-27 RX ADMIN — ONDANSETRON 4 MG: 2 INJECTION, SOLUTION INTRAMUSCULAR; INTRAVENOUS at 17:21

## 2025-05-27 RX ADMIN — PANTOPRAZOLE SODIUM 40 MG: 40 INJECTION, POWDER, FOR SOLUTION INTRAVENOUS at 13:35

## 2025-05-27 RX ADMIN — HYDROMORPHONE HYDROCHLORIDE 0.25 MG: 1 INJECTION, SOLUTION INTRAMUSCULAR; INTRAVENOUS; SUBCUTANEOUS at 01:58

## 2025-05-27 RX ADMIN — POTASSIUM CHLORIDE 10 MEQ: 10 INJECTION, SOLUTION INTRAVENOUS at 09:47

## 2025-05-27 RX ADMIN — HYDROMORPHONE HYDROCHLORIDE 0.25 MG: 1 INJECTION, SOLUTION INTRAMUSCULAR; INTRAVENOUS; SUBCUTANEOUS at 21:08

## 2025-05-27 RX ADMIN — SODIUM CHLORIDE, PRESERVATIVE FREE 10 ML: 5 INJECTION INTRAVENOUS at 20:14

## 2025-05-27 RX ADMIN — POTASSIUM CHLORIDE 10 MEQ: 10 INJECTION, SOLUTION INTRAVENOUS at 08:37

## 2025-05-27 RX ADMIN — DIPHENHYDRAMINE HYDROCHLORIDE 25 MG: 50 INJECTION INTRAMUSCULAR; INTRAVENOUS at 13:26

## 2025-05-27 RX ADMIN — DIPHENHYDRAMINE HYDROCHLORIDE 25 MG: 50 INJECTION INTRAMUSCULAR; INTRAVENOUS at 20:13

## 2025-05-27 RX ADMIN — HYDROMORPHONE HYDROCHLORIDE 0.25 MG: 1 INJECTION, SOLUTION INTRAMUSCULAR; INTRAVENOUS; SUBCUTANEOUS at 06:57

## 2025-05-27 RX ADMIN — HYDROMORPHONE HYDROCHLORIDE 0.25 MG: 1 INJECTION, SOLUTION INTRAMUSCULAR; INTRAVENOUS; SUBCUTANEOUS at 12:04

## 2025-05-27 ASSESSMENT — PAIN DESCRIPTION - LOCATION
LOCATION: KNEE
LOCATION: LEG
LOCATION: ANKLE;LEG
LOCATION: KNEE
LOCATION: LEG
LOCATION: KNEE
LOCATION: LEG
LOCATION: KNEE
LOCATION: LEG

## 2025-05-27 ASSESSMENT — PAIN DESCRIPTION - PAIN TYPE: TYPE: SURGICAL PAIN

## 2025-05-27 ASSESSMENT — PAIN - FUNCTIONAL ASSESSMENT
PAIN_FUNCTIONAL_ASSESSMENT: PREVENTS OR INTERFERES WITH MANY ACTIVE NOT PASSIVE ACTIVITIES
PAIN_FUNCTIONAL_ASSESSMENT: PREVENTS OR INTERFERES SOME ACTIVE ACTIVITIES AND ADLS
PAIN_FUNCTIONAL_ASSESSMENT: PREVENTS OR INTERFERES SOME ACTIVE ACTIVITIES AND ADLS
PAIN_FUNCTIONAL_ASSESSMENT: PREVENTS OR INTERFERES WITH MANY ACTIVE NOT PASSIVE ACTIVITIES

## 2025-05-27 ASSESSMENT — PAIN SCALES - GENERAL
PAINLEVEL_OUTOF10: 8
PAINLEVEL_OUTOF10: 3
PAINLEVEL_OUTOF10: 8
PAINLEVEL_OUTOF10: 3
PAINLEVEL_OUTOF10: 7
PAINLEVEL_OUTOF10: 9
PAINLEVEL_OUTOF10: 3

## 2025-05-27 ASSESSMENT — PAIN DESCRIPTION - DESCRIPTORS
DESCRIPTORS: ACHING;DISCOMFORT;SHARP
DESCRIPTORS: DISCOMFORT;STABBING;THROBBING
DESCRIPTORS: DISCOMFORT;STABBING;THROBBING
DESCRIPTORS: THROBBING;DISCOMFORT;STABBING
DESCRIPTORS: ACHING;DISCOMFORT;SHARP
DESCRIPTORS: THROBBING;DISCOMFORT;STABBING
DESCRIPTORS: ACHING;SHARP;DISCOMFORT
DESCRIPTORS: THROBBING;STABBING;DISCOMFORT
DESCRIPTORS: ACHING

## 2025-05-27 ASSESSMENT — PAIN DESCRIPTION - ORIENTATION
ORIENTATION: LEFT

## 2025-05-27 NOTE — FLOWSHEET NOTE
05/27/25 1155   Vital Signs   BP (!) 146/88   Temp 98.1 °F (36.7 °C)   Pulse (!) 112   Respirations 16   Pain Assessment   Pain Assessment None - Denies Pain   Post-Hemodialysis Assessment   Post-Treatment Procedures Access bleeding time < 10 minutes   Rinseback Volume (ml) 300 ml   Blood Volume Processed (Liters) 90.2 L   Dialyzer Clearance Lightly streaked   Duration of Treatment (minutes) 240 minutes   Hemodialysis Intake (ml) 300 ml   Hemodialysis Output (ml) 2800 ml   NET Removed (ml) 2500   Tolerated Treatment Good   Time Off 1133   Observations & Evaluations   Level of Consciousness 0   Respiratory Quality/Effort Unlabored   O2 Device Nasal cannula   Handoff   Communication Given Shift Handoff   Handoff Given To Hiren   Handoff Received From Martin   Handoff Communication Face to Face

## 2025-05-28 ENCOUNTER — APPOINTMENT (OUTPATIENT)
Dept: GENERAL RADIOLOGY | Age: 40
DRG: 438 | End: 2025-05-28
Payer: MEDICARE

## 2025-05-28 LAB
ALBUMIN SERPL-MCNC: 3.1 G/DL (ref 3.5–5.2)
ALP SERPL-CCNC: 123 U/L (ref 35–104)
ALT SERPL-CCNC: <5 U/L (ref 0–32)
ANION GAP SERPL CALCULATED.3IONS-SCNC: 12 MMOL/L (ref 7–16)
AST SERPL-CCNC: 11 U/L (ref 0–31)
BASOPHILS # BLD: 0.02 K/UL (ref 0–0.2)
BASOPHILS NFR BLD: 0 % (ref 0–2)
BILIRUB SERPL-MCNC: 0.3 MG/DL (ref 0–1.2)
BUN SERPL-MCNC: 11 MG/DL (ref 6–20)
CALCIUM SERPL-MCNC: 8.8 MG/DL (ref 8.6–10.2)
CHLORIDE SERPL-SCNC: 96 MMOL/L (ref 98–107)
CO2 SERPL-SCNC: 24 MMOL/L (ref 22–29)
CREAT SERPL-MCNC: 3.9 MG/DL (ref 0.5–1)
EOSINOPHIL # BLD: 0.36 K/UL (ref 0.05–0.5)
EOSINOPHILS RELATIVE PERCENT: 5 % (ref 0–6)
ERYTHROCYTE [DISTWIDTH] IN BLOOD BY AUTOMATED COUNT: 15.7 % (ref 11.5–15)
GFR, ESTIMATED: 14 ML/MIN/1.73M2
GLUCOSE BLD-MCNC: 225 MG/DL (ref 74–99)
GLUCOSE BLD-MCNC: 236 MG/DL (ref 74–99)
GLUCOSE BLD-MCNC: 276 MG/DL (ref 74–99)
GLUCOSE BLD-MCNC: 290 MG/DL (ref 74–99)
GLUCOSE SERPL-MCNC: 241 MG/DL (ref 74–99)
HCT VFR BLD AUTO: 23.7 % (ref 34–48)
HGB BLD-MCNC: 7.9 G/DL (ref 11.5–15.5)
IMM GRANULOCYTES # BLD AUTO: 0.03 K/UL (ref 0–0.58)
IMM GRANULOCYTES NFR BLD: 1 % (ref 0–5)
INR PPP: 1.1
LYMPHOCYTES NFR BLD: 0.61 K/UL (ref 1.5–4)
LYMPHOCYTES RELATIVE PERCENT: 9 % (ref 20–42)
MAGNESIUM SERPL-MCNC: 2 MG/DL (ref 1.6–2.6)
MCH RBC QN AUTO: 29.4 PG (ref 26–35)
MCHC RBC AUTO-ENTMCNC: 33.3 G/DL (ref 32–34.5)
MCV RBC AUTO: 88.1 FL (ref 80–99.9)
MONOCYTES NFR BLD: 0.67 K/UL (ref 0.1–0.95)
MONOCYTES NFR BLD: 10 % (ref 2–12)
NEUTROPHILS NFR BLD: 75 % (ref 43–80)
NEUTS SEG NFR BLD: 4.96 K/UL (ref 1.8–7.3)
PARTIAL THROMBOPLASTIN TIME: 28.9 SEC (ref 24.5–35.1)
PHOSPHATE SERPL-MCNC: 3.5 MG/DL (ref 2.5–4.5)
PLATELET # BLD AUTO: 190 K/UL (ref 130–450)
PMV BLD AUTO: 8.8 FL (ref 7–12)
POTASSIUM SERPL-SCNC: 4.1 MMOL/L (ref 3.5–5)
PROT SERPL-MCNC: 6.8 G/DL (ref 6.4–8.3)
PROTHROMBIN TIME: 11.8 SEC (ref 9.3–12.4)
RBC # BLD AUTO: 2.69 M/UL (ref 3.5–5.5)
SODIUM SERPL-SCNC: 132 MMOL/L (ref 132–146)
WBC OTHER # BLD: 6.7 K/UL (ref 4.5–11.5)

## 2025-05-28 PROCEDURE — 6360000002 HC RX W HCPCS: Performed by: STUDENT IN AN ORGANIZED HEALTH CARE EDUCATION/TRAINING PROGRAM

## 2025-05-28 PROCEDURE — 2060000000 HC ICU INTERMEDIATE R&B

## 2025-05-28 PROCEDURE — 85025 COMPLETE CBC W/AUTO DIFF WBC: CPT

## 2025-05-28 PROCEDURE — 6370000000 HC RX 637 (ALT 250 FOR IP): Performed by: INTERNAL MEDICINE

## 2025-05-28 PROCEDURE — 6370000000 HC RX 637 (ALT 250 FOR IP): Performed by: STUDENT IN AN ORGANIZED HEALTH CARE EDUCATION/TRAINING PROGRAM

## 2025-05-28 PROCEDURE — 99291 CRITICAL CARE FIRST HOUR: CPT | Performed by: INTERNAL MEDICINE

## 2025-05-28 PROCEDURE — 99232 SBSQ HOSP IP/OBS MODERATE 35: CPT | Performed by: STUDENT IN AN ORGANIZED HEALTH CARE EDUCATION/TRAINING PROGRAM

## 2025-05-28 PROCEDURE — 80053 COMPREHEN METABOLIC PANEL: CPT

## 2025-05-28 PROCEDURE — 82962 GLUCOSE BLOOD TEST: CPT

## 2025-05-28 PROCEDURE — 99232 SBSQ HOSP IP/OBS MODERATE 35: CPT | Performed by: INTERNAL MEDICINE

## 2025-05-28 PROCEDURE — 2700000000 HC OXYGEN THERAPY PER DAY

## 2025-05-28 PROCEDURE — 2580000003 HC RX 258: Performed by: NURSE PRACTITIONER

## 2025-05-28 PROCEDURE — 73560 X-RAY EXAM OF KNEE 1 OR 2: CPT

## 2025-05-28 PROCEDURE — 85730 THROMBOPLASTIN TIME PARTIAL: CPT

## 2025-05-28 PROCEDURE — 83735 ASSAY OF MAGNESIUM: CPT

## 2025-05-28 PROCEDURE — 6360000002 HC RX W HCPCS: Performed by: NURSE PRACTITIONER

## 2025-05-28 PROCEDURE — 84100 ASSAY OF PHOSPHORUS: CPT

## 2025-05-28 PROCEDURE — 85610 PROTHROMBIN TIME: CPT

## 2025-05-28 PROCEDURE — 2500000003 HC RX 250 WO HCPCS: Performed by: STUDENT IN AN ORGANIZED HEALTH CARE EDUCATION/TRAINING PROGRAM

## 2025-05-28 PROCEDURE — 90935 HEMODIALYSIS ONE EVALUATION: CPT

## 2025-05-28 RX ORDER — FERROUS SULFATE 325(65) MG
325 TABLET ORAL EVERY OTHER DAY
Status: DISCONTINUED | OUTPATIENT
Start: 2025-05-28 | End: 2025-06-05 | Stop reason: HOSPADM

## 2025-05-28 RX ORDER — OXYCODONE AND ACETAMINOPHEN 5; 325 MG/1; MG/1
1 TABLET ORAL EVERY 6 HOURS PRN
Refills: 0 | Status: DISCONTINUED | OUTPATIENT
Start: 2025-05-28 | End: 2025-05-30

## 2025-05-28 RX ORDER — INSULIN LISPRO 100 [IU]/ML
0-6 INJECTION, SOLUTION INTRAVENOUS; SUBCUTANEOUS
Status: DISCONTINUED | OUTPATIENT
Start: 2025-05-28 | End: 2025-06-02

## 2025-05-28 RX ORDER — ASPIRIN 81 MG/1
81 TABLET ORAL 2 TIMES DAILY
Status: DISCONTINUED | OUTPATIENT
Start: 2025-05-28 | End: 2025-06-05 | Stop reason: HOSPADM

## 2025-05-28 RX ORDER — MIDODRINE HYDROCHLORIDE 5 MG/1
5 TABLET ORAL 2 TIMES DAILY WITH MEALS
Status: DISCONTINUED | OUTPATIENT
Start: 2025-05-28 | End: 2025-05-28

## 2025-05-28 RX ORDER — HEPARIN SODIUM 10000 [USP'U]/ML
5000 INJECTION, SOLUTION INTRAVENOUS; SUBCUTANEOUS EVERY 8 HOURS
Status: DISCONTINUED | OUTPATIENT
Start: 2025-05-28 | End: 2025-05-28

## 2025-05-28 RX ORDER — MIDODRINE HYDROCHLORIDE 2.5 MG/1
2.5 TABLET ORAL 2 TIMES DAILY WITH MEALS
Status: DISCONTINUED | OUTPATIENT
Start: 2025-05-28 | End: 2025-06-05 | Stop reason: HOSPADM

## 2025-05-28 RX ORDER — GABAPENTIN 100 MG/1
100 CAPSULE ORAL 2 TIMES DAILY
Status: DISCONTINUED | OUTPATIENT
Start: 2025-05-28 | End: 2025-06-05 | Stop reason: HOSPADM

## 2025-05-28 RX ORDER — BISACODYL 10 MG
10 SUPPOSITORY, RECTAL RECTAL DAILY PRN
Status: DISCONTINUED | OUTPATIENT
Start: 2025-05-28 | End: 2025-06-05 | Stop reason: HOSPADM

## 2025-05-28 RX ORDER — DOCUSATE SODIUM 100 MG/1
100 CAPSULE, LIQUID FILLED ORAL DAILY
Status: DISCONTINUED | OUTPATIENT
Start: 2025-05-28 | End: 2025-06-05 | Stop reason: HOSPADM

## 2025-05-28 RX ORDER — PANTOPRAZOLE SODIUM 40 MG/1
40 TABLET, DELAYED RELEASE ORAL
Status: DISCONTINUED | OUTPATIENT
Start: 2025-05-29 | End: 2025-06-05 | Stop reason: HOSPADM

## 2025-05-28 RX ADMIN — DOCUSATE SODIUM 100 MG: 100 CAPSULE, LIQUID FILLED ORAL at 12:42

## 2025-05-28 RX ADMIN — GABAPENTIN 100 MG: 100 CAPSULE ORAL at 12:42

## 2025-05-28 RX ADMIN — ASPIRIN 81 MG: 81 TABLET, COATED ORAL at 12:42

## 2025-05-28 RX ADMIN — INSULIN LISPRO 2 UNITS: 100 INJECTION, SOLUTION INTRAVENOUS; SUBCUTANEOUS at 05:50

## 2025-05-28 RX ADMIN — Medication 325 MG: at 12:42

## 2025-05-28 RX ADMIN — SALINE NASAL SPRAY 1 SPRAY: 1.5 SOLUTION NASAL at 01:40

## 2025-05-28 RX ADMIN — HYDROMORPHONE HYDROCHLORIDE 0.25 MG: 1 INJECTION, SOLUTION INTRAMUSCULAR; INTRAVENOUS; SUBCUTANEOUS at 07:34

## 2025-05-28 RX ADMIN — INSULIN LISPRO 2 UNITS: 100 INJECTION, SOLUTION INTRAVENOUS; SUBCUTANEOUS at 12:38

## 2025-05-28 RX ADMIN — INSULIN LISPRO 2 UNITS: 100 INJECTION, SOLUTION INTRAVENOUS; SUBCUTANEOUS at 19:23

## 2025-05-28 RX ADMIN — OXYCODONE AND ACETAMINOPHEN 1 TABLET: 5; 325 TABLET ORAL at 12:43

## 2025-05-28 RX ADMIN — ASPIRIN 81 MG: 81 TABLET, COATED ORAL at 20:41

## 2025-05-28 RX ADMIN — DIPHENHYDRAMINE HYDROCHLORIDE 25 MG: 50 INJECTION INTRAMUSCULAR; INTRAVENOUS at 21:48

## 2025-05-28 RX ADMIN — INSULIN LISPRO 2 UNITS: 100 INJECTION, SOLUTION INTRAVENOUS; SUBCUTANEOUS at 07:30

## 2025-05-28 RX ADMIN — SODIUM CHLORIDE, PRESERVATIVE FREE 10 ML: 5 INJECTION INTRAVENOUS at 20:43

## 2025-05-28 RX ADMIN — GABAPENTIN 100 MG: 100 CAPSULE ORAL at 20:41

## 2025-05-28 RX ADMIN — INSULIN LISPRO 3 UNITS: 100 INJECTION, SOLUTION INTRAVENOUS; SUBCUTANEOUS at 20:41

## 2025-05-28 RX ADMIN — HYDROMORPHONE HYDROCHLORIDE 0.25 MG: 1 INJECTION, SOLUTION INTRAMUSCULAR; INTRAVENOUS; SUBCUTANEOUS at 02:01

## 2025-05-28 RX ADMIN — PANTOPRAZOLE SODIUM 40 MG: 40 INJECTION, POWDER, FOR SOLUTION INTRAVENOUS at 07:29

## 2025-05-28 RX ADMIN — OXYCODONE AND ACETAMINOPHEN 1 TABLET: 5; 325 TABLET ORAL at 20:40

## 2025-05-28 RX ADMIN — INSULIN GLARGINE 5 UNITS: 100 INJECTION, SOLUTION SUBCUTANEOUS at 07:31

## 2025-05-28 RX ADMIN — HYDRALAZINE HYDROCHLORIDE 10 MG: 20 INJECTION INTRAMUSCULAR; INTRAVENOUS at 03:05

## 2025-05-28 RX ADMIN — SODIUM CHLORIDE, PRESERVATIVE FREE 10 ML: 5 INJECTION INTRAVENOUS at 07:30

## 2025-05-28 RX ADMIN — INSULIN LISPRO 1 UNITS: 100 INJECTION, SOLUTION INTRAVENOUS; SUBCUTANEOUS at 12:38

## 2025-05-28 ASSESSMENT — PAIN DESCRIPTION - DESCRIPTORS
DESCRIPTORS: ACHING;DISCOMFORT
DESCRIPTORS: THROBBING;SORE;DULL
DESCRIPTORS: BURNING;DISCOMFORT
DESCRIPTORS: ACHING;DISCOMFORT
DESCRIPTORS: ACHING;DISCOMFORT;THROBBING
DESCRIPTORS: ACHING;DISCOMFORT

## 2025-05-28 ASSESSMENT — PAIN SCALES - GENERAL
PAINLEVEL_OUTOF10: 7
PAINLEVEL_OUTOF10: 6
PAINLEVEL_OUTOF10: 6
PAINLEVEL_OUTOF10: 9
PAINLEVEL_OUTOF10: 7
PAINLEVEL_OUTOF10: 3
PAINLEVEL_OUTOF10: 9
PAINLEVEL_OUTOF10: 6
PAINLEVEL_OUTOF10: 3

## 2025-05-28 ASSESSMENT — PAIN DESCRIPTION - LOCATION
LOCATION: KNEE
LOCATION: LEG;ANKLE
LOCATION: KNEE
LOCATION: KNEE

## 2025-05-28 ASSESSMENT — PAIN - FUNCTIONAL ASSESSMENT
PAIN_FUNCTIONAL_ASSESSMENT: PREVENTS OR INTERFERES SOME ACTIVE ACTIVITIES AND ADLS

## 2025-05-28 ASSESSMENT — PAIN DESCRIPTION - FREQUENCY
FREQUENCY: CONTINUOUS
FREQUENCY: CONTINUOUS

## 2025-05-28 ASSESSMENT — PAIN SCALES - WONG BAKER
WONGBAKER_NUMERICALRESPONSE: NO HURT

## 2025-05-28 ASSESSMENT — PAIN DESCRIPTION - ORIENTATION
ORIENTATION: LEFT

## 2025-05-28 ASSESSMENT — PAIN DESCRIPTION - ONSET
ONSET: ON-GOING
ONSET: ON-GOING

## 2025-05-28 ASSESSMENT — PAIN DESCRIPTION - PAIN TYPE
TYPE: SURGICAL PAIN
TYPE: SURGICAL PAIN

## 2025-05-28 NOTE — FLOWSHEET NOTE
Called report to nurse [id number]. Patient cell phone and  sent with patient . Patient transport to dialysis unit for dialysis prior to transfer to [facility].

## 2025-05-28 NOTE — CARE COORDINATION
Case Management Assessment  Initial Evaluation    Date/Time of Evaluation: 5/28/2025 1:24 PM  Assessment Completed by: Nunu Bingham RN    If patient is discharged prior to next notation, then this note serves as note for discharge by case management.    Patient Name: Michelle Nettles                   YOB: 1985  Diagnosis: Shortness of breath [R06.02]  ESRD (end stage renal disease) (HCC) [N18.6]  DKA, type 2, not at goal (HCC) [E11.10]  Diabetic ketoacidosis without coma associated with type 2 diabetes mellitus (HCC) [E11.10]  Acute on chronic anemia [D64.9]                   Date / Time: 5/26/2025  7:29 AM    Patient Admission Status: Inpatient   Readmission Risk (Low < 19, Mod (19-27), High > 27): Readmission Risk Score: 46.6    Current PCP: Rogers Rodríguez MD  PCP verified by CM? Yes    Chart Reviewed: Yes      History Provided by: Patient  Patient Orientation: Alert and Oriented    Patient Cognition: Alert    Hospitalization in the last 30 days (Readmission):  Yes    If yes, Readmission Assessment in CM Navigator will be completed.    Advance Directives:      Code Status: Full Code   Patient's Primary Decision Maker is: Named in Scanned ACP Document    Primary Decision Maker: Deana Musa - Brother/Sister - [phone]    Secondary Decision Maker: Cain Musa - Brother/Sister - [phone]    Supplemental (Other) Decision Maker: Nicole Musa - Other - [phone]    Supplemental (Other) Decision Maker: Barbie Celeste - Brother/Sister - [phone]    Supplemental (Other) Decision Maker: Santosh Pink - Aunt/Uncle - [phone]    Discharge Planning:    Patient lives with: Alone Type of Home: House  Primary Care Giver: Self  Patient Support Systems include: Family Members   Current Financial resources:    Current community resources:    Current services prior to admission: Oxygen Therapy            Current DME:              Type of Home Care services:  [facility],

## 2025-05-28 NOTE — FLOWSHEET NOTE
05/28/25 1749   Vital Signs   BP (!) 142/83   Temp 97.8 °F (36.6 °C)   Pulse (!) 109   Respirations 16   Weight - Scale 66.8 kg (147 lb 4.3 oz)   Weight Method Bed scale   Percent Weight Change -4.57   Post-Hemodialysis Assessment   Post-Treatment Procedures Blood returned;Access bleeding time < 10 minutes   Machine Disinfection Process Acid/Vinegar Clean;Heat Disinfect;Exterior Machine Disinfection   Rinseback Volume (ml) 300 ml   Blood Volume Processed (Liters) 79 L   Dialyzer Clearance Lightly streaked   Duration of Treatment (minutes) 210 minutes   Heparin Amount Administered During Treatment (mL) 0 mL   Hemodialysis Intake (ml) 300 ml   Hemodialysis Output (ml) 4000 ml   NET Removed (ml) 3700   Tolerated Treatment Good   Patient Response to Treatment tolerated well. blood returned, needles pulled, sites held, stasis achieved, bandaids applied, +thrill, +bruit

## 2025-05-28 NOTE — CARE COORDINATION
Care Coordination: LOS 2 day.  Admitted with DKA, ESRD, SOB.   Pt to ICU, initially on insulin and now bridged. Met with pt at bedside to discuss transition of care needs. HD pt [facility] on [address]. Pt states she lives alone, her daughter stays with caregiver. She states she was to have [facility] but they never showed up. She is in the process of working with her casemanger for [facility] for passport services at home. She has bsc, tub bench, home 02 5 ltr through [employer]. She states she will need ambulette home and she has the key. She would like hh. Follows with Dr Rodríguez and Dr Dyer. Had Dexcom CGM, uses SQ insulin. Office is working with insurance to get her Dexcom G7. She does not want her sister Deana as ACP contact. she would like her brother linda. call to spiritual care to follow.  Spoke to Awa at [facility] and will needs new orders, as they did not open up--- they did call her to come out but she was going to HD and then she was readmitted. Will follow   Electronically signed by Nunu Bingham RN on 5/28/2025 at 1:19 PM     ADDENDUM: Pt states she needs ambulette to dc, no steps to home and does have the key  Electronically signed by Nunu Bingham RN on 5/28/2025 at 1:26 PM     The Plan for Transition of Care is related to the following treatment goals: dc    The Patient was provided with a choice of provider and agrees   with the discharge plan. [x] Yes [] No    Freedom of choice list was provided with basic dialogue that supports the patient's individualized plan of care/goals, treatment preferences and shares the quality data associated with the providers. [x] Yes [] No

## 2025-05-29 PROBLEM — R79.89 ELEVATED TROPONIN: Status: RESOLVED | Noted: 2021-01-27 | Resolved: 2025-05-29

## 2025-05-29 LAB
ALBUMIN SERPL-MCNC: 3.4 G/DL (ref 3.5–5.2)
ALP SERPL-CCNC: 146 U/L (ref 35–104)
ALT SERPL-CCNC: <5 U/L (ref 0–35)
ANION GAP SERPL CALCULATED.3IONS-SCNC: 12 MMOL/L (ref 7–16)
AST SERPL-CCNC: 14 U/L (ref 0–35)
BASOPHILS # BLD: 0.02 K/UL (ref 0–0.2)
BASOPHILS NFR BLD: 1 % (ref 0–2)
BILIRUB SERPL-MCNC: 0.3 MG/DL (ref 0–1.2)
BUN SERPL-MCNC: 13 MG/DL (ref 6–20)
CALCIUM SERPL-MCNC: 9 MG/DL (ref 8.6–10)
CHLORIDE SERPL-SCNC: 94 MMOL/L (ref 98–107)
CO2 SERPL-SCNC: 25 MMOL/L (ref 22–29)
CREAT SERPL-MCNC: 3.6 MG/DL (ref 0.5–1)
EOSINOPHIL # BLD: 0.19 K/UL (ref 0.05–0.5)
EOSINOPHILS RELATIVE PERCENT: 5 % (ref 0–6)
ERYTHROCYTE [DISTWIDTH] IN BLOOD BY AUTOMATED COUNT: 15.5 % (ref 11.5–15)
GFR, ESTIMATED: 16 ML/MIN/1.73M2
GLUCOSE BLD-MCNC: 124 MG/DL (ref 74–99)
GLUCOSE BLD-MCNC: 317 MG/DL (ref 74–99)
GLUCOSE BLD-MCNC: 321 MG/DL (ref 74–99)
GLUCOSE BLD-MCNC: >500 MG/DL (ref 74–99)
GLUCOSE SERPL-MCNC: 524 MG/DL (ref 74–99)
GLUCOSE SERPL-MCNC: 573 MG/DL (ref 74–99)
HCT VFR BLD AUTO: 26.1 % (ref 34–48)
HGB BLD-MCNC: 8.5 G/DL (ref 11.5–15.5)
IMM GRANULOCYTES # BLD AUTO: <0.03 K/UL (ref 0–0.58)
IMM GRANULOCYTES NFR BLD: 1 % (ref 0–5)
INR PPP: 1
LYMPHOCYTES NFR BLD: 0.62 K/UL (ref 1.5–4)
LYMPHOCYTES RELATIVE PERCENT: 15 % (ref 20–42)
MAGNESIUM SERPL-MCNC: 2 MG/DL (ref 1.6–2.6)
MCH RBC QN AUTO: 29.7 PG (ref 26–35)
MCHC RBC AUTO-ENTMCNC: 32.6 G/DL (ref 32–34.5)
MCV RBC AUTO: 91.3 FL (ref 80–99.9)
MONOCYTES NFR BLD: 0.48 K/UL (ref 0.1–0.95)
MONOCYTES NFR BLD: 12 % (ref 2–12)
NEUTROPHILS NFR BLD: 68 % (ref 43–80)
NEUTS SEG NFR BLD: 2.8 K/UL (ref 1.8–7.3)
PARTIAL THROMBOPLASTIN TIME: 29.8 SEC (ref 24.5–35.1)
PHOSPHATE SERPL-MCNC: 3 MG/DL (ref 2.5–4.5)
PLATELET # BLD AUTO: 174 K/UL (ref 130–450)
PMV BLD AUTO: 9.7 FL (ref 7–12)
POTASSIUM SERPL-SCNC: 4.6 MMOL/L (ref 3.5–5.1)
PROT SERPL-MCNC: 7.6 G/DL (ref 6.4–8.3)
PROTHROMBIN TIME: 11 SEC (ref 9.3–12.4)
RBC # BLD AUTO: 2.86 M/UL (ref 3.5–5.5)
SODIUM SERPL-SCNC: 132 MMOL/L (ref 136–145)
WBC OTHER # BLD: 4.1 K/UL (ref 4.5–11.5)

## 2025-05-29 PROCEDURE — 2500000003 HC RX 250 WO HCPCS: Performed by: STUDENT IN AN ORGANIZED HEALTH CARE EDUCATION/TRAINING PROGRAM

## 2025-05-29 PROCEDURE — 6370000000 HC RX 637 (ALT 250 FOR IP): Performed by: INTERNAL MEDICINE

## 2025-05-29 PROCEDURE — 83735 ASSAY OF MAGNESIUM: CPT

## 2025-05-29 PROCEDURE — 99232 SBSQ HOSP IP/OBS MODERATE 35: CPT | Performed by: STUDENT IN AN ORGANIZED HEALTH CARE EDUCATION/TRAINING PROGRAM

## 2025-05-29 PROCEDURE — 36415 COLL VENOUS BLD VENIPUNCTURE: CPT

## 2025-05-29 PROCEDURE — 85025 COMPLETE CBC W/AUTO DIFF WBC: CPT

## 2025-05-29 PROCEDURE — 82947 ASSAY GLUCOSE BLOOD QUANT: CPT

## 2025-05-29 PROCEDURE — 6360000002 HC RX W HCPCS: Performed by: STUDENT IN AN ORGANIZED HEALTH CARE EDUCATION/TRAINING PROGRAM

## 2025-05-29 PROCEDURE — 6370000000 HC RX 637 (ALT 250 FOR IP): Performed by: NURSE PRACTITIONER

## 2025-05-29 PROCEDURE — 80053 COMPREHEN METABOLIC PANEL: CPT

## 2025-05-29 PROCEDURE — 2700000000 HC OXYGEN THERAPY PER DAY

## 2025-05-29 PROCEDURE — 6370000000 HC RX 637 (ALT 250 FOR IP): Performed by: STUDENT IN AN ORGANIZED HEALTH CARE EDUCATION/TRAINING PROGRAM

## 2025-05-29 PROCEDURE — 84100 ASSAY OF PHOSPHORUS: CPT

## 2025-05-29 PROCEDURE — 99223 1ST HOSP IP/OBS HIGH 75: CPT | Performed by: INTERNAL MEDICINE

## 2025-05-29 PROCEDURE — 90935 HEMODIALYSIS ONE EVALUATION: CPT

## 2025-05-29 PROCEDURE — 85610 PROTHROMBIN TIME: CPT

## 2025-05-29 PROCEDURE — 85730 THROMBOPLASTIN TIME PARTIAL: CPT

## 2025-05-29 PROCEDURE — 2060000000 HC ICU INTERMEDIATE R&B

## 2025-05-29 PROCEDURE — 82962 GLUCOSE BLOOD TEST: CPT

## 2025-05-29 RX ORDER — TRAMADOL HYDROCHLORIDE 50 MG/1
50 TABLET ORAL EVERY 12 HOURS PRN
Status: DISCONTINUED | OUTPATIENT
Start: 2025-05-29 | End: 2025-06-01

## 2025-05-29 RX ORDER — METOPROLOL TARTRATE 25 MG/1
12.5 TABLET, FILM COATED ORAL 2 TIMES DAILY
Status: DISCONTINUED | OUTPATIENT
Start: 2025-05-29 | End: 2025-06-05 | Stop reason: HOSPADM

## 2025-05-29 RX ADMIN — MIDODRINE HYDROCHLORIDE 2.5 MG: 2.5 TABLET ORAL at 16:15

## 2025-05-29 RX ADMIN — SODIUM CHLORIDE, PRESERVATIVE FREE 10 ML: 5 INJECTION INTRAVENOUS at 09:32

## 2025-05-29 RX ADMIN — ASPIRIN 81 MG: 81 TABLET, COATED ORAL at 09:32

## 2025-05-29 RX ADMIN — INSULIN LISPRO 6 UNITS: 100 INJECTION, SOLUTION INTRAVENOUS; SUBCUTANEOUS at 06:46

## 2025-05-29 RX ADMIN — METOPROLOL TARTRATE 12.5 MG: 25 TABLET, FILM COATED ORAL at 20:26

## 2025-05-29 RX ADMIN — INSULIN LISPRO 4 UNITS: 100 INJECTION, SOLUTION INTRAVENOUS; SUBCUTANEOUS at 20:36

## 2025-05-29 RX ADMIN — ASPIRIN 81 MG: 81 TABLET, COATED ORAL at 20:26

## 2025-05-29 RX ADMIN — INSULIN LISPRO 2 UNITS: 100 INJECTION, SOLUTION INTRAVENOUS; SUBCUTANEOUS at 18:37

## 2025-05-29 RX ADMIN — TRAMADOL HYDROCHLORIDE 50 MG: 50 TABLET, COATED ORAL at 01:29

## 2025-05-29 RX ADMIN — MIDODRINE HYDROCHLORIDE 2.5 MG: 2.5 TABLET ORAL at 09:32

## 2025-05-29 RX ADMIN — DIPHENHYDRAMINE HYDROCHLORIDE 25 MG: 50 INJECTION INTRAMUSCULAR; INTRAVENOUS at 20:36

## 2025-05-29 RX ADMIN — SODIUM CHLORIDE, PRESERVATIVE FREE 10 ML: 5 INJECTION INTRAVENOUS at 20:26

## 2025-05-29 RX ADMIN — INSULIN LISPRO 4 UNITS: 100 INJECTION, SOLUTION INTRAVENOUS; SUBCUTANEOUS at 11:31

## 2025-05-29 RX ADMIN — TRAMADOL HYDROCHLORIDE 50 MG: 50 TABLET, COATED ORAL at 16:15

## 2025-05-29 RX ADMIN — OXYCODONE AND ACETAMINOPHEN 1 TABLET: 5; 325 TABLET ORAL at 18:43

## 2025-05-29 RX ADMIN — OXYCODONE AND ACETAMINOPHEN 1 TABLET: 5; 325 TABLET ORAL at 04:22

## 2025-05-29 RX ADMIN — INSULIN LISPRO 2 UNITS: 100 INJECTION, SOLUTION INTRAVENOUS; SUBCUTANEOUS at 11:31

## 2025-05-29 RX ADMIN — SALINE NASAL SPRAY 1 SPRAY: 1.5 SOLUTION NASAL at 23:14

## 2025-05-29 RX ADMIN — OXYCODONE AND ACETAMINOPHEN 1 TABLET: 5; 325 TABLET ORAL at 11:27

## 2025-05-29 RX ADMIN — ONDANSETRON 4 MG: 2 INJECTION, SOLUTION INTRAMUSCULAR; INTRAVENOUS at 16:15

## 2025-05-29 RX ADMIN — GABAPENTIN 100 MG: 100 CAPSULE ORAL at 18:38

## 2025-05-29 RX ADMIN — GABAPENTIN 100 MG: 100 CAPSULE ORAL at 09:32

## 2025-05-29 RX ADMIN — PANTOPRAZOLE SODIUM 40 MG: 40 TABLET, DELAYED RELEASE ORAL at 06:36

## 2025-05-29 RX ADMIN — INSULIN LISPRO 2 UNITS: 100 INJECTION, SOLUTION INTRAVENOUS; SUBCUTANEOUS at 06:47

## 2025-05-29 RX ADMIN — INSULIN GLARGINE 5 UNITS: 100 INJECTION, SOLUTION SUBCUTANEOUS at 09:32

## 2025-05-29 ASSESSMENT — PAIN DESCRIPTION - FREQUENCY
FREQUENCY: CONTINUOUS

## 2025-05-29 ASSESSMENT — PAIN DESCRIPTION - DESCRIPTORS
DESCRIPTORS: ACHING;DISCOMFORT;THROBBING

## 2025-05-29 ASSESSMENT — PAIN DESCRIPTION - LOCATION
LOCATION: KNEE

## 2025-05-29 ASSESSMENT — PAIN DESCRIPTION - ONSET
ONSET: ON-GOING

## 2025-05-29 ASSESSMENT — PAIN DESCRIPTION - PAIN TYPE
TYPE: ACUTE PAIN

## 2025-05-29 ASSESSMENT — PAIN - FUNCTIONAL ASSESSMENT
PAIN_FUNCTIONAL_ASSESSMENT: PREVENTS OR INTERFERES SOME ACTIVE ACTIVITIES AND ADLS

## 2025-05-29 ASSESSMENT — PAIN SCALES - GENERAL
PAINLEVEL_OUTOF10: 6
PAINLEVEL_OUTOF10: 8
PAINLEVEL_OUTOF10: 9
PAINLEVEL_OUTOF10: 10
PAINLEVEL_OUTOF10: 9
PAINLEVEL_OUTOF10: 9
PAINLEVEL_OUTOF10: 8
PAINLEVEL_OUTOF10: 6
PAINLEVEL_OUTOF10: 9
PAINLEVEL_OUTOF10: 8
PAINLEVEL_OUTOF10: 6

## 2025-05-29 ASSESSMENT — PAIN DESCRIPTION - ORIENTATION
ORIENTATION: LEFT

## 2025-05-29 ASSESSMENT — PAIN SCALES - WONG BAKER
WONGBAKER_NUMERICALRESPONSE: NO HURT
WONGBAKER_NUMERICALRESPONSE: NO HURT

## 2025-05-29 NOTE — FLOWSHEET NOTE
05/29/25 1527   Vital Signs   BP (!) 142/79   Temp (!) 96.6 °F (35.9 °C)   Pulse (!) 105   Respirations 16   Weight - Scale 63.5 kg (139 lb 15.9 oz)   Weight Method Bed scale   Percent Weight Change -4.94   Post-Hemodialysis Assessment   Post-Treatment Procedures Blood returned;Access bleeding time < 10 minutes   Machine Disinfection Process Exterior Machine Disinfection   Rinseback Volume (ml) 300 ml   Blood Volume Processed (Liters) 79.1 L   Dialyzer Clearance Lightly streaked   Duration of Treatment (minutes) 210 minutes   Heparin Amount Administered During Treatment (mL) 0 mL   Hemodialysis Intake (ml) 300 ml   Hemodialysis Output (ml) 2000 ml   NET Removed (ml) 1700   Tolerated Treatment Good   Patient Response to Treatment tolerated well, blood returned, needles pulled, sites held, stasis achieved, bandaids applied, +thirll, +bruit        0 = understands/communicates without difficulty

## 2025-05-30 ENCOUNTER — APPOINTMENT (OUTPATIENT)
Age: 40
DRG: 438 | End: 2025-05-30
Payer: MEDICARE

## 2025-05-30 LAB
ANION GAP SERPL CALCULATED.3IONS-SCNC: 15 MMOL/L (ref 7–16)
BUN SERPL-MCNC: 20 MG/DL (ref 6–20)
CALCIUM SERPL-MCNC: 8.9 MG/DL (ref 8.6–10)
CHLORIDE SERPL-SCNC: 93 MMOL/L (ref 98–107)
CO2 SERPL-SCNC: 21 MMOL/L (ref 22–29)
CREAT SERPL-MCNC: 3.3 MG/DL (ref 0.5–1)
ECHO AO ASC DIAM: 3.4 CM
ECHO AO ASCENDING AORTA INDEX: 2.15 CM/M2
ECHO AV AREA PEAK VELOCITY: 2.2 CM2
ECHO AV AREA VTI: 2.5 CM2
ECHO AV AREA/BSA PEAK VELOCITY: 1.4 CM2/M2
ECHO AV AREA/BSA VTI: 1.6 CM2/M2
ECHO AV CUSP MM: 1.9 CM
ECHO AV MEAN GRADIENT: 8 MMHG
ECHO AV MEAN VELOCITY: 1.4 M/S
ECHO AV PEAK GRADIENT: 12 MMHG
ECHO AV PEAK VELOCITY: 1.7 M/S
ECHO AV VELOCITY RATIO: 0.76
ECHO AV VTI: 24.8 CM
ECHO BSA: 1.71 M2
ECHO LA DIAMETER INDEX: 1.58 CM/M2
ECHO LA DIAMETER: 2.5 CM
ECHO LA VOL A-L A2C: 55 ML (ref 22–52)
ECHO LA VOL A-L A4C: 37 ML (ref 22–52)
ECHO LA VOL BP: 45 ML (ref 22–52)
ECHO LA VOL MOD A2C: 54 ML (ref 22–52)
ECHO LA VOL MOD A4C: 35 ML (ref 22–52)
ECHO LA VOL/BSA BIPLANE: 28 ML/M2 (ref 16–34)
ECHO LA VOLUME AREA LENGTH: 47 ML
ECHO LA VOLUME INDEX A-L A2C: 35 ML/M2 (ref 16–34)
ECHO LA VOLUME INDEX A-L A4C: 23 ML/M2 (ref 16–34)
ECHO LA VOLUME INDEX AREA LENGTH: 30 ML/M2 (ref 16–34)
ECHO LA VOLUME INDEX MOD A2C: 34 ML/M2 (ref 16–34)
ECHO LA VOLUME INDEX MOD A4C: 22 ML/M2 (ref 16–34)
ECHO LV EJECTION FRACTION 3D: 55 %
ECHO LV FRACTIONAL SHORTENING: 36 % (ref 28–44)
ECHO LV INTERNAL DIMENSION DIASTOLE INDEX: 2.28 CM/M2
ECHO LV INTERNAL DIMENSION DIASTOLIC: 3.6 CM (ref 3.9–5.3)
ECHO LV INTERNAL DIMENSION SYSTOLIC INDEX: 1.46 CM/M2
ECHO LV INTERNAL DIMENSION SYSTOLIC: 2.3 CM
ECHO LV ISOVOLUMETRIC RELAXATION TIME (IVRT): 76.1 MS
ECHO LV IVSD: 1.2 CM (ref 0.6–0.9)
ECHO LV MASS 2D: 141.5 G (ref 67–162)
ECHO LV MASS INDEX 2D: 89.6 G/M2 (ref 43–95)
ECHO LV POSTERIOR WALL DIASTOLIC: 1.2 CM (ref 0.6–0.9)
ECHO LV RELATIVE WALL THICKNESS RATIO: 0.67
ECHO LVOT AREA: 2.8 CM2
ECHO LVOT AV VTI INDEX: 0.85
ECHO LVOT DIAM: 1.9 CM
ECHO LVOT MEAN GRADIENT: 3 MMHG
ECHO LVOT PEAK GRADIENT: 7 MMHG
ECHO LVOT PEAK VELOCITY: 1.3 M/S
ECHO LVOT STROKE VOLUME INDEX: 38 ML/M2
ECHO LVOT SV: 60.1 ML
ECHO LVOT VTI: 21.2 CM
ECHO MV "A" WAVE DURATION: 79.9 MSEC
ECHO MV A VELOCITY: 0.7 M/S
ECHO MV AREA PHT: 5.2 CM2
ECHO MV AREA VTI: 3.5 CM2
ECHO MV E DECELERATION TIME (DT): 202 MS
ECHO MV E VELOCITY: 0.8 M/S
ECHO MV E/A RATIO: 1.14
ECHO MV LVOT VTI INDEX: 0.82
ECHO MV MAX VELOCITY: 0.9 M/S
ECHO MV MEAN GRADIENT: 2 MMHG
ECHO MV MEAN VELOCITY: 0.7 M/S
ECHO MV PEAK GRADIENT: 4 MMHG
ECHO MV PRESSURE HALF TIME (PHT): 42.3 MS
ECHO MV VTI: 17.4 CM
ECHO PV MAX VELOCITY: 1.1 M/S
ECHO PV MEAN GRADIENT: 2 MMHG
ECHO PV MEAN VELOCITY: 0.7 M/S
ECHO PV PEAK GRADIENT: 4 MMHG
ECHO PV VTI: 15.7 CM
ECHO RV INTERNAL DIMENSION: 3.4 CM
ECHO RV LONGITUDINAL DIMENSION: 6.6 CM
ECHO RV MID DIMENSION: 2.5 CM
GFR, ESTIMATED: 17 ML/MIN/1.73M2
GLUCOSE BLD-MCNC: 146 MG/DL (ref 74–99)
GLUCOSE BLD-MCNC: 307 MG/DL (ref 74–99)
GLUCOSE BLD-MCNC: 368 MG/DL (ref 74–99)
GLUCOSE BLD-MCNC: 407 MG/DL (ref 74–99)
GLUCOSE BLD-MCNC: 415 MG/DL (ref 74–99)
GLUCOSE SERPL-MCNC: 356 MG/DL (ref 74–99)
MAGNESIUM SERPL-MCNC: 1.9 MG/DL (ref 1.6–2.6)
PHOSPHATE SERPL-MCNC: 3.2 MG/DL (ref 2.5–4.5)
POTASSIUM SERPL-SCNC: 4.8 MMOL/L (ref 3.5–5.1)
SODIUM SERPL-SCNC: 129 MMOL/L (ref 136–145)

## 2025-05-30 PROCEDURE — 6370000000 HC RX 637 (ALT 250 FOR IP): Performed by: NURSE PRACTITIONER

## 2025-05-30 PROCEDURE — 82962 GLUCOSE BLOOD TEST: CPT

## 2025-05-30 PROCEDURE — 93306 TTE W/DOPPLER COMPLETE: CPT

## 2025-05-30 PROCEDURE — 80048 BASIC METABOLIC PNL TOTAL CA: CPT

## 2025-05-30 PROCEDURE — 6360000002 HC RX W HCPCS: Performed by: STUDENT IN AN ORGANIZED HEALTH CARE EDUCATION/TRAINING PROGRAM

## 2025-05-30 PROCEDURE — 2700000000 HC OXYGEN THERAPY PER DAY

## 2025-05-30 PROCEDURE — 6370000000 HC RX 637 (ALT 250 FOR IP): Performed by: INTERNAL MEDICINE

## 2025-05-30 PROCEDURE — 2500000003 HC RX 250 WO HCPCS: Performed by: STUDENT IN AN ORGANIZED HEALTH CARE EDUCATION/TRAINING PROGRAM

## 2025-05-30 PROCEDURE — 99232 SBSQ HOSP IP/OBS MODERATE 35: CPT | Performed by: STUDENT IN AN ORGANIZED HEALTH CARE EDUCATION/TRAINING PROGRAM

## 2025-05-30 PROCEDURE — 2060000000 HC ICU INTERMEDIATE R&B

## 2025-05-30 PROCEDURE — 93306 TTE W/DOPPLER COMPLETE: CPT | Performed by: INTERNAL MEDICINE

## 2025-05-30 PROCEDURE — 6370000000 HC RX 637 (ALT 250 FOR IP): Performed by: STUDENT IN AN ORGANIZED HEALTH CARE EDUCATION/TRAINING PROGRAM

## 2025-05-30 PROCEDURE — 90935 HEMODIALYSIS ONE EVALUATION: CPT

## 2025-05-30 PROCEDURE — 84100 ASSAY OF PHOSPHORUS: CPT

## 2025-05-30 PROCEDURE — 83735 ASSAY OF MAGNESIUM: CPT

## 2025-05-30 PROCEDURE — 99233 SBSQ HOSP IP/OBS HIGH 50: CPT | Performed by: INTERNAL MEDICINE

## 2025-05-30 PROCEDURE — 36415 COLL VENOUS BLD VENIPUNCTURE: CPT

## 2025-05-30 RX ORDER — INSULIN LISPRO 100 [IU]/ML
5 INJECTION, SOLUTION INTRAVENOUS; SUBCUTANEOUS ONCE
Status: COMPLETED | OUTPATIENT
Start: 2025-05-31 | End: 2025-05-31

## 2025-05-30 RX ORDER — OXYCODONE AND ACETAMINOPHEN 5; 325 MG/1; MG/1
1 TABLET ORAL EVERY 6 HOURS PRN
Refills: 0 | Status: DISCONTINUED | OUTPATIENT
Start: 2025-05-30 | End: 2025-06-05 | Stop reason: HOSPADM

## 2025-05-30 RX ADMIN — DIPHENHYDRAMINE HYDROCHLORIDE 25 MG: 50 INJECTION INTRAMUSCULAR; INTRAVENOUS at 20:09

## 2025-05-30 RX ADMIN — TRAMADOL HYDROCHLORIDE 50 MG: 50 TABLET, COATED ORAL at 04:16

## 2025-05-30 RX ADMIN — DIPHENHYDRAMINE HYDROCHLORIDE 25 MG: 50 INJECTION INTRAMUSCULAR; INTRAVENOUS at 06:35

## 2025-05-30 RX ADMIN — SODIUM CHLORIDE, PRESERVATIVE FREE 10 ML: 5 INJECTION INTRAVENOUS at 12:26

## 2025-05-30 RX ADMIN — OXYCODONE AND ACETAMINOPHEN 1 TABLET: 5; 325 TABLET ORAL at 01:28

## 2025-05-30 RX ADMIN — MIDODRINE HYDROCHLORIDE 2.5 MG: 2.5 TABLET ORAL at 12:18

## 2025-05-30 RX ADMIN — INSULIN LISPRO 6 UNITS: 100 INJECTION, SOLUTION INTRAVENOUS; SUBCUTANEOUS at 06:47

## 2025-05-30 RX ADMIN — INSULIN LISPRO 2 UNITS: 100 INJECTION, SOLUTION INTRAVENOUS; SUBCUTANEOUS at 06:48

## 2025-05-30 RX ADMIN — GABAPENTIN 100 MG: 100 CAPSULE ORAL at 17:22

## 2025-05-30 RX ADMIN — OXYCODONE AND ACETAMINOPHEN 1 TABLET: 5; 325 TABLET ORAL at 12:20

## 2025-05-30 RX ADMIN — OXYCODONE AND ACETAMINOPHEN 1 TABLET: 5; 325 TABLET ORAL at 21:21

## 2025-05-30 RX ADMIN — Medication 325 MG: at 12:25

## 2025-05-30 RX ADMIN — GABAPENTIN 100 MG: 100 CAPSULE ORAL at 12:18

## 2025-05-30 RX ADMIN — SODIUM CHLORIDE, PRESERVATIVE FREE 10 ML: 5 INJECTION INTRAVENOUS at 20:10

## 2025-05-30 RX ADMIN — ONDANSETRON 4 MG: 4 TABLET, ORALLY DISINTEGRATING ORAL at 15:33

## 2025-05-30 RX ADMIN — INSULIN LISPRO 4 UNITS: 100 INJECTION, SOLUTION INTRAVENOUS; SUBCUTANEOUS at 12:35

## 2025-05-30 RX ADMIN — METOPROLOL TARTRATE 12.5 MG: 25 TABLET, FILM COATED ORAL at 20:09

## 2025-05-30 RX ADMIN — ASPIRIN 81 MG: 81 TABLET, COATED ORAL at 12:18

## 2025-05-30 RX ADMIN — INSULIN LISPRO 2 UNITS: 100 INJECTION, SOLUTION INTRAVENOUS; SUBCUTANEOUS at 12:35

## 2025-05-30 RX ADMIN — MIDODRINE HYDROCHLORIDE 2.5 MG: 2.5 TABLET ORAL at 17:22

## 2025-05-30 RX ADMIN — METOPROLOL TARTRATE 12.5 MG: 25 TABLET, FILM COATED ORAL at 12:19

## 2025-05-30 RX ADMIN — ASPIRIN 81 MG: 81 TABLET, COATED ORAL at 20:09

## 2025-05-30 RX ADMIN — INSULIN LISPRO 5 UNITS: 100 INJECTION, SOLUTION INTRAVENOUS; SUBCUTANEOUS at 20:09

## 2025-05-30 RX ADMIN — PANTOPRAZOLE SODIUM 40 MG: 40 TABLET, DELAYED RELEASE ORAL at 06:36

## 2025-05-30 RX ADMIN — DOCUSATE SODIUM 100 MG: 100 CAPSULE, LIQUID FILLED ORAL at 12:19

## 2025-05-30 ASSESSMENT — PAIN SCALES - GENERAL
PAINLEVEL_OUTOF10: 6
PAINLEVEL_OUTOF10: 10
PAINLEVEL_OUTOF10: 5
PAINLEVEL_OUTOF10: 7
PAINLEVEL_OUTOF10: 7
PAINLEVEL_OUTOF10: 10
PAINLEVEL_OUTOF10: 7

## 2025-05-30 ASSESSMENT — PAIN DESCRIPTION - DESCRIPTORS
DESCRIPTORS: ACHING;DISCOMFORT;THROBBING
DESCRIPTORS: ACHING;DISCOMFORT;THROBBING
DESCRIPTORS: ACHING;NAGGING;THROBBING

## 2025-05-30 ASSESSMENT — PAIN DESCRIPTION - ORIENTATION
ORIENTATION: LEFT

## 2025-05-30 ASSESSMENT — PAIN DESCRIPTION - LOCATION
LOCATION: KNEE

## 2025-05-30 NOTE — PROCEDURES
Attempted echo X3.  with patient. Told to return in 15 minutes. Attempted again and still with patient. Will try another time.

## 2025-05-30 NOTE — CONSULTS
Consult was sent and has been read  
Critical Care Consult Note    Patient - Michelle Nettles   MRN -  [id number]   [id number]   - 1985      Date of Admission -  2025  7:29 AM  Date of evaluation -  2025  4407/4407-A   Hospital Day - 0          ADMIT/CONSULT DETAILS     Reason for Admit/Consult   DKA    Consulting Service/Physician   Consulting - Della Nguyen MD  Primary Care Physician - Rogers Rodríguez MD HPI   The patient is a 39 y.o. female with significant past medical history of chronic anemia, ESRD on hemodialysis, aVF, cellulitis, CHF, DM type I, hypertension, recent admission on 2025 through 2025 found to have low hemoglobin, received PRBC, found to have a acute traumatic nondisplaced obliquely oriented intra articular fracture involving the medial femoral condyle status post left medial femoral condyle open reduction internal fixation on , discharged home now presented to ED with nausea.  Patient stated her sugar has been high because she has not eaten anything.  Patient also complaining of skipping dialysis on Saturday given her weakness.  Patient denies of fever, chills, palpitation, chest pain, lightheadedness.  Patient was medicated for vomiting, which has improved.  Upon ED workup, found to have a hemoglobin of 6.7, glucose over 600, sodium of 130, BUN of 54, creatinine 11.2, lipase of 602, troponin of 243, proBNP of 23,330, beta-hydroxybutyrate greater than 4.5, hemoglobin A1c 7.5, RVP/COVID-19 PCR negative.  CTA pulmonary with contrast no pulmonary emboli, pulmonary edema.  CT abdomen pelvis with IV contrast showed a acute pancreatitis with peripancreatic fat stranding and inflammatory changes.  No gastric fluid collection.  Correlate with amylase and lipase value.  Cholelithiasis without CT evidence of acute cholecystitis.  Severe chronic atrophy of the bilateral kidneys suggestive chronic renal failure.  No hydronephrosis.  Patient was given IV fluid bolus, 1 unit of PRBC, 
Nephrology Consult Note  Patient's Name: Michelle Nettles  9:13 AM  5/26/2025         Reason for Consult: ESRD, HD dependent, volume overload  Requesting Physician:  Rogers Rodríguez MD    Chief Complaint: Shortness of breath , vomiting   History Obtained From:  patient and EHR    History of Present Ilness:    Michelle Nettles is a 39 y.o. female with a history of ESRD HD dependent, type 1 diabetes mellitus, chronic volume overload, hypertension and several other medical problems listed below.  She presents to ED with complaints of nausea and emesis over the course of the past 2 days.  Unable to keep any of her medications or food down.  This was associated with some mild abdominal discomfort.  She denies constipation or diarrhea.  Illness prevented her from going for her usual hemodialysis.  She presented to ED for further evaluation.  On presentation to ED vitals showed blood pressure 163/75 pulse 118 respirations 20, temperature 98.7.  Lab data show hemoglobin A1c 7.5%, lactic acid 2.5 proBNP 23 K+, beta hydroxy butyrate >4.5; sodium 126, potassium 4.4, chloride 78, CO2 11 anion gap 38 glucose 975, BUN 56, creatinine 11.2, WBC 8.3, hemoglobin 6.7 platelet count 284K; lipase 602  CT A/P with IV contrast showed signs of acute pancreatitis with peripancreatic fat stranding and inflammatory changes.  CT of the chest showed no pulmonary emboli but showed findings of pulmonary edema.  Patient was started on DKA protocol  She has been admitted to MICU  We are consulted for dialysis management; hemodialysis ordered.    Past Medical History:   Diagnosis Date    JOLLY (acute kidney injury) 04/05/2016    Anemia     AVF (arteriovenous fistula) 02/19/2018    let arm    Cellulitis, face     CHF (congestive heart failure) (HCC)     Chronic kidney disease     Chronic respiratory failure with hypoxia (HCC)     Dialysis AV fistula malfunction, initial encounter 11/07/2019    Displaced trimalleolar fracture of left 
Diabetes Paternal Aunt        Social History     Tobacco Use    Smoking status: Some Days     Current packs/day: 0.50     Average packs/day: 0.5 packs/day for 25.9 years (13.0 ttl pk-yrs)     Types: Cigarettes     Start date: 6/19/1999     Passive exposure: Past    Smokeless tobacco: Never   Substance Use Topics    Alcohol use: Not Currently     Alcohol/week: 0.0 standard drinks of alcohol       Current Facility-Administered Medications   Medication Dose Route Frequency Provider Last Rate Last Admin    oxyCODONE-acetaminophen (PERCOCET) 5-325 MG per tablet 1 tablet  1 tablet Oral Q6H PRN Brian Mosqueda MD   1 tablet at 05/30/25 1220    traMADol (ULTRAM) tablet 50 mg  50 mg Oral Q12H PRN Sivan Reece APRN - CNP   50 mg at 05/30/25 0416    metoprolol tartrate (LOPRESSOR) tablet 12.5 mg  12.5 mg Oral BID Blas Enriquez MD   12.5 mg at 05/30/25 1219    sulfur hexafluoride microspheres (LUMASON) 60.7-25 MG injection 2 mL  2 mL IntraVENous ONCE PRN Francine Turner APRN - CNP        aspirin EC tablet 81 mg  81 mg Oral BID Blas Enriquez MD   81 mg at 05/30/25 1218    bisacodyl (DULCOLAX) suppository 10 mg  10 mg Rectal Daily PRN Blas Enriquez MD        docusate sodium (COLACE) capsule 100 mg  100 mg Oral Daily Blas Enriquez MD   100 mg at 05/30/25 1219    ferrous sulfate (IRON 325) tablet 325 mg  325 mg Oral Every Other Day Blas Enriquez MD   325 mg at 05/30/25 1225    gabapentin (NEURONTIN) capsule 100 mg  100 mg Oral BID Blas Enriquez MD   100 mg at 05/30/25 1218    pantoprazole (PROTONIX) tablet 40 mg  40 mg Oral QAM AC Blas Enriquez MD   40 mg at 05/30/25 0636    midodrine (PROAMATINE) tablet 2.5 mg  2.5 mg Oral BID WC Brian Mosqueda MD   2.5 mg at 05/30/25 1218    insulin lispro (HUMALOG,ADMELOG) injection vial 0-6 Units  0-6 Units SubCUTAneous 4x Daily AC & HS Brennan Dyer MD   4 Units at 05/30/25 1235    insulin glargine (LANTUS) injection vial 5 Units  5 Units SubCUTAneous Daily 
121.49 ml       Physical examination:  General: awake alert, oriented x3  HEENT: normocephalic non traumatic, no exophthalmos   Neck: supple, No thyroid tenderness,  Pulm: good equal air entry no added sounds  CVS: S1 + S2  Abd: soft lax, no tenderness  Skin: warm, no lesions, no rash. No open wounds, no ulcers   Neuro: CN intact, sensation decreased bilateral , muscle power normal  Psych: normal mood, and affect    Review of Laboratory Data:  I personally reviewed the following labs:   Recent Labs     05/26/25  0745 05/26/25  1436 05/27/25  0616   WBC 8.3  --  6.2   RBC 2.29*  --  2.56*   HGB 6.7* 7.7* 7.7*   HCT 21.8* 22.0* 22.2*   MCV 95.2  --  86.7   MCH 29.3  --  30.1   MCHC 30.7*  --  34.7*   RDW 16.0*  --  15.0     --  219   MPV 9.6  --  8.8     Recent Labs     05/26/25  0745 05/26/25  0936 05/27/25  0616 05/27/25  0617 05/27/25  1318   *   < > 134* 133* 134*   K 4.4   < > 3.9 3.8 3.8   CL 78*   < > 97* 96* 96*   CO2 11*   < > 26 26 25   BUN 56*   < > 16 16 5*   CREATININE 11.2*   < > 5.4* 5.3* 2.2*   GLUCOSE 975*   < > 132* 131* 183*   CALCIUM 8.7   < > 8.2* 8.1* 8.6   BILITOT 0.3  --  0.3  --   --    ALKPHOS 160*  --  130*  --   --    AST 16  --  14  --   --    ALT <5  --  <5  --   --     < > = values in this interval not displayed.     Beta-Hydroxybutyrate   Date Value Ref Range Status   05/27/2025 0.44 (H) 0.02 - 0.27 mmol/L Final   05/26/2025 >4.50 (H) 0.02 - 0.27 mmol/L Final   03/29/2025 0.12 0.02 - 0.27 mmol/L Final     Lab Results   Component Value Date/Time    LABA1C 7.5 05/26/2025 07:45 AM    LABA1C 7.1 05/16/2025 10:36 AM    LABA1C 6.5 04/29/2025 06:50 AM     Lab Results   Component Value Date/Time    TSH 3.71 03/20/2025 10:50 AM    T4FREE 1.0 03/20/2025 10:50 AM     Lab Results   Component Value Date/Time    LABA1C 7.5 05/26/2025 07:45 AM    GLUCOSE 183 05/27/2025 01:18 PM    GLUCOSE 279 07/27/2011 04:40 PM    MALBCR 1988.2 10/08/2013 10:30 AM     Lab Results   Component Value 
Check HR and pulse ox daily and PRN symptoms  Patient not taking: Reported on 5/15/2025 2/4/25   Rogers Rodríguez MD   vitamin B-12 (CYANOCOBALAMIN) 500 MCG tablet Take 1 tablet by mouth daily 2/4/25   Rogers Rodríguez MD   Accu-Chek Softclix Lancets MISC 1 each by Does not apply route 4 times daily  Patient not taking: Reported on 5/15/2025 1/28/25   Angelina Benitez APRN - CNP   Glucagon, rDNA, (GLUCAGON EMERGENCY) 1 MG KIT Inject 1 mg as directed as needed (for blood glucose level <50) 1/28/25   Angelina Benitez APRN - CNP   docusate sodium (COLACE) 100 MG capsule Take 1 capsule by mouth daily 1/22/25   Rogers Rodríguez MD   fluticasone (FLONASE) 50 MCG/ACT nasal spray 2 sprays by Each Nostril route daily 1/15/25   Krystyna Salcido MD   Blood Glucose Monitoring Suppl (ACCU-CHEK GUIDE) w/Device KIT Use to test blood sugars  Patient not taking: Reported on 5/15/2025 12/16/24   Angelina Benitez APRN - CNP   Insulin Pen Needle 32G X 4 MM MISC 1 each by Does not apply route 4 times daily  Patient not taking: Reported on 5/15/2025 12/4/24   Brennan Dyer MD   Alcohol Swabs (ALCOHOL PREP) PADS Use as needed  Patient not taking: Reported on 5/15/2025 11/25/24   Angelina Benitez APRN - CNP   albuterol (PROVENTIL) (5 MG/ML) 0.5% nebulizer solution Take 1 mL by nebulization 4 times daily as needed for Wheezing 7/19/24   Della Nguyen MD   albuterol sulfate HFA (PROVENTIL;VENTOLIN;PROAIR) 108 (90 Base) MCG/ACT inhaler Inhale 2 puffs into the lungs every 4-6 hours as needed for Wheezing or Shortness of Breath 7/19/24   Della Nguyen MD   OXYGEN Inhale 5 L into the lungs continuous    Provider, MD Adrianne       Current Medications:    Current Facility-Administered Medications: traMADol (ULTRAM) tablet 50 mg, 50 mg, Oral, Q12H PRN  metoprolol tartrate (LOPRESSOR) tablet 12.5 mg, 12.5 mg, Oral, BID  aspirin EC tablet 81 mg, 81 mg, Oral, BID  bisacodyl (DULCOLAX) suppository 10 mg, 10 mg, Rectal, Daily PRN  docusate sodium

## 2025-05-30 NOTE — ACP (ADVANCE CARE PLANNING)
Advance Care Planning     Advance Care Planning Inpatient Note  [facility] Department    Today's Date: 5/30/2025  Unit: [facility]    Received request from rounding, patient, and Other:  4400 .  Upon review of chart and communication with care team, patient's decision making abilities are not in question.. Patient was/were present in the room during visit.    Goals of ACP Conversation:  Discuss advance care planning documents  Facilitate a discussion related to patient's goals of care as they align with the patient's values and beliefs.    Health Care Decision Makers:       Primary Decision Maker: Cain Musa - Brother/Sister - [phone]    Secondary Decision Maker: Nohemy Musa - Brother/Sister - [phone]  Summary:  Completed New Documents  Verified Healthcare Decision Maker  Updated Healthcare Decision Maker  The  assisted the patient with completing the POA Documentation and updated decision makers information according to the patients wishes.    Advance Care Planning Documents (Patient Wishes):  Healthcare Power of /Advance Directive Appointment of Health Care Agent     Assessment:   The patient was fully aware and had understanding of information in the documentation changes made and POA updated according the patient's wishes.   Interventions:  Provided education on documents for clarity and greater understanding  Discussed and provided education on state decision maker hierarchy  Assisted in the completion of documents according to patient's wishes at this time  Encouraged ongoing ACP conversation with future decision makers and loved ones    Care Preferences Communicated:   Full Code    Outcomes/Plan:  ACP Discussion: Completed  New advance directive completed.  Returned original document(s) to patient, as well as copies for distribution to appointed agents  Copy of advance directive given to staff to scan into medical record.  Routed ACP note to attending

## 2025-05-30 NOTE — PROCEDURES
PROCEDURE NOTE  Date: 5/30/2025   Name: Michelle Nettles  YOB: 1985    Procedures      Attempted echo but patient in dialysis

## 2025-05-30 NOTE — CARE COORDINATION
CM Update: Met with patient at bedside to discuss transition of care plan. Discharge plan is Home via ambulette. Face Sheet, Ambulette Form, and Envelope in soft chart. Patient came to hospital with weakness. Found to have a BG over 600, hgb 6.7, cr 11.2 and BUN 54. Dialysis patient. Was on Insulin drip. CM/SW to follow. Cornelius Childs [phone]

## 2025-05-31 LAB
ANION GAP SERPL CALCULATED.3IONS-SCNC: 12 MMOL/L (ref 7–16)
BUN SERPL-MCNC: 22 MG/DL (ref 6–20)
CALCIUM SERPL-MCNC: 9.3 MG/DL (ref 8.6–10)
CHLORIDE SERPL-SCNC: 93 MMOL/L (ref 98–107)
CO2 SERPL-SCNC: 26 MMOL/L (ref 22–29)
CREAT SERPL-MCNC: 4 MG/DL (ref 0.5–1)
ERYTHROCYTE [DISTWIDTH] IN BLOOD BY AUTOMATED COUNT: 15.2 % (ref 11.5–15)
GFR, ESTIMATED: 14 ML/MIN/1.73M2
GLUCOSE BLD-MCNC: 190 MG/DL (ref 74–99)
GLUCOSE BLD-MCNC: 365 MG/DL (ref 74–99)
GLUCOSE BLD-MCNC: 389 MG/DL (ref 74–99)
GLUCOSE BLD-MCNC: 458 MG/DL (ref 74–99)
GLUCOSE SERPL-MCNC: 465 MG/DL (ref 74–99)
HCT VFR BLD AUTO: 26.3 % (ref 34–48)
HGB BLD-MCNC: 8.6 G/DL (ref 11.5–15.5)
MCH RBC QN AUTO: 29.6 PG (ref 26–35)
MCHC RBC AUTO-ENTMCNC: 32.7 G/DL (ref 32–34.5)
MCV RBC AUTO: 90.4 FL (ref 80–99.9)
PLATELET # BLD AUTO: 186 K/UL (ref 130–450)
PMV BLD AUTO: 10 FL (ref 7–12)
POTASSIUM SERPL-SCNC: 4.9 MMOL/L (ref 3.5–5.1)
RBC # BLD AUTO: 2.91 M/UL (ref 3.5–5.5)
SODIUM SERPL-SCNC: 130 MMOL/L (ref 136–145)
WBC OTHER # BLD: 5.3 K/UL (ref 4.5–11.5)

## 2025-05-31 PROCEDURE — 6370000000 HC RX 637 (ALT 250 FOR IP): Performed by: INTERNAL MEDICINE

## 2025-05-31 PROCEDURE — 36415 COLL VENOUS BLD VENIPUNCTURE: CPT

## 2025-05-31 PROCEDURE — 6360000002 HC RX W HCPCS: Performed by: STUDENT IN AN ORGANIZED HEALTH CARE EDUCATION/TRAINING PROGRAM

## 2025-05-31 PROCEDURE — 85027 COMPLETE CBC AUTOMATED: CPT

## 2025-05-31 PROCEDURE — 6370000000 HC RX 637 (ALT 250 FOR IP): Performed by: NURSE PRACTITIONER

## 2025-05-31 PROCEDURE — 6370000000 HC RX 637 (ALT 250 FOR IP): Performed by: STUDENT IN AN ORGANIZED HEALTH CARE EDUCATION/TRAINING PROGRAM

## 2025-05-31 PROCEDURE — 2500000003 HC RX 250 WO HCPCS: Performed by: STUDENT IN AN ORGANIZED HEALTH CARE EDUCATION/TRAINING PROGRAM

## 2025-05-31 PROCEDURE — 2700000000 HC OXYGEN THERAPY PER DAY

## 2025-05-31 PROCEDURE — 90935 HEMODIALYSIS ONE EVALUATION: CPT

## 2025-05-31 PROCEDURE — 82962 GLUCOSE BLOOD TEST: CPT

## 2025-05-31 PROCEDURE — 80048 BASIC METABOLIC PNL TOTAL CA: CPT

## 2025-05-31 PROCEDURE — 2060000000 HC ICU INTERMEDIATE R&B

## 2025-05-31 RX ADMIN — OXYCODONE AND ACETAMINOPHEN 1 TABLET: 5; 325 TABLET ORAL at 06:43

## 2025-05-31 RX ADMIN — TRAMADOL HYDROCHLORIDE 50 MG: 50 TABLET, COATED ORAL at 11:44

## 2025-05-31 RX ADMIN — INSULIN LISPRO 5 UNITS: 100 INJECTION, SOLUTION INTRAVENOUS; SUBCUTANEOUS at 20:11

## 2025-05-31 RX ADMIN — PANTOPRAZOLE SODIUM 40 MG: 40 TABLET, DELAYED RELEASE ORAL at 06:43

## 2025-05-31 RX ADMIN — INSULIN LISPRO 2 UNITS: 100 INJECTION, SOLUTION INTRAVENOUS; SUBCUTANEOUS at 06:44

## 2025-05-31 RX ADMIN — MIDODRINE HYDROCHLORIDE 2.5 MG: 2.5 TABLET ORAL at 17:51

## 2025-05-31 RX ADMIN — METOPROLOL TARTRATE 12.5 MG: 25 TABLET, FILM COATED ORAL at 20:11

## 2025-05-31 RX ADMIN — INSULIN LISPRO 5 UNITS: 100 INJECTION, SOLUTION INTRAVENOUS; SUBCUTANEOUS at 17:51

## 2025-05-31 RX ADMIN — OXYCODONE AND ACETAMINOPHEN 1 TABLET: 5; 325 TABLET ORAL at 14:13

## 2025-05-31 RX ADMIN — MIDODRINE HYDROCHLORIDE 2.5 MG: 2.5 TABLET ORAL at 11:43

## 2025-05-31 RX ADMIN — INSULIN LISPRO 5 UNITS: 100 INJECTION, SOLUTION INTRAVENOUS; SUBCUTANEOUS at 00:02

## 2025-05-31 RX ADMIN — INSULIN LISPRO 2 UNITS: 100 INJECTION, SOLUTION INTRAVENOUS; SUBCUTANEOUS at 11:30

## 2025-05-31 RX ADMIN — GABAPENTIN 100 MG: 100 CAPSULE ORAL at 11:43

## 2025-05-31 RX ADMIN — ASPIRIN 81 MG: 81 TABLET, COATED ORAL at 20:11

## 2025-05-31 RX ADMIN — INSULIN LISPRO 1 UNITS: 100 INJECTION, SOLUTION INTRAVENOUS; SUBCUTANEOUS at 11:43

## 2025-05-31 RX ADMIN — METOPROLOL TARTRATE 12.5 MG: 25 TABLET, FILM COATED ORAL at 11:43

## 2025-05-31 RX ADMIN — SODIUM CHLORIDE, PRESERVATIVE FREE 10 ML: 5 INJECTION INTRAVENOUS at 11:43

## 2025-05-31 RX ADMIN — ASPIRIN 81 MG: 81 TABLET, COATED ORAL at 11:43

## 2025-05-31 RX ADMIN — INSULIN LISPRO 6 UNITS: 100 INJECTION, SOLUTION INTRAVENOUS; SUBCUTANEOUS at 06:43

## 2025-05-31 RX ADMIN — ACETAMINOPHEN 650 MG: 325 TABLET ORAL at 17:51

## 2025-05-31 RX ADMIN — DOCUSATE SODIUM 100 MG: 100 CAPSULE, LIQUID FILLED ORAL at 11:43

## 2025-05-31 RX ADMIN — OXYCODONE AND ACETAMINOPHEN 1 TABLET: 5; 325 TABLET ORAL at 22:07

## 2025-05-31 RX ADMIN — INSULIN GLARGINE 5 UNITS: 100 INJECTION, SOLUTION SUBCUTANEOUS at 11:44

## 2025-05-31 RX ADMIN — INSULIN LISPRO 2 UNITS: 100 INJECTION, SOLUTION INTRAVENOUS; SUBCUTANEOUS at 17:52

## 2025-05-31 RX ADMIN — SODIUM CHLORIDE, PRESERVATIVE FREE 10 ML: 5 INJECTION INTRAVENOUS at 20:11

## 2025-05-31 RX ADMIN — DIPHENHYDRAMINE HYDROCHLORIDE 25 MG: 50 INJECTION INTRAMUSCULAR; INTRAVENOUS at 20:11

## 2025-05-31 RX ADMIN — GABAPENTIN 100 MG: 100 CAPSULE ORAL at 17:51

## 2025-05-31 ASSESSMENT — PAIN SCALES - GENERAL
PAINLEVEL_OUTOF10: 7
PAINLEVEL_OUTOF10: 10
PAINLEVEL_OUTOF10: 8
PAINLEVEL_OUTOF10: 8

## 2025-05-31 ASSESSMENT — PAIN DESCRIPTION - ORIENTATION
ORIENTATION: MID
ORIENTATION: LEFT
ORIENTATION: MID

## 2025-05-31 ASSESSMENT — PAIN DESCRIPTION - PAIN TYPE: TYPE: ACUTE PAIN

## 2025-05-31 ASSESSMENT — PAIN DESCRIPTION - DESCRIPTORS
DESCRIPTORS: ACHING;DISCOMFORT;NAGGING
DESCRIPTORS: ACHING;DISCOMFORT;CRAMPING
DESCRIPTORS: CRAMPING;ACHING;DISCOMFORT

## 2025-05-31 ASSESSMENT — PAIN DESCRIPTION - LOCATION
LOCATION: ABDOMEN
LOCATION: KNEE
LOCATION: ABDOMEN

## 2025-05-31 NOTE — FLOWSHEET NOTE
05/31/25 1035   Vital Signs   BP (!) 116/54   Temp 97.8 °F (36.6 °C)   Pulse (!) 104   Respirations 16   Weight - Scale 62.4 kg (137 lb 9.1 oz)   Weight Method Bed scale   Percent Weight Change -1.11   Post-Hemodialysis Assessment   Post-Treatment Procedures Blood returned;Access bleeding time < 10 minutes   Machine Disinfection Process Exterior Machine Disinfection   Rinseback Volume (ml) 300 ml   Blood Volume Processed (Liters) 76.7 L   Dialyzer Clearance Lightly streaked   Duration of Treatment (minutes) 210 minutes   Heparin Amount Administered During Treatment (mL) 0 mL   Hemodialysis Intake (ml) 300 ml   Hemodialysis Output (ml) 1200 ml   NET Removed (ml) 900   Tolerated Treatment Fair   Patient Response to Treatment tolerated fair, blood returned, needles pulled, sites held, stasis achieved, bandaids applied, +thill, +bruit

## 2025-06-01 LAB
ANION GAP SERPL CALCULATED.3IONS-SCNC: 11 MMOL/L (ref 7–16)
BASOPHILS # BLD: 0.03 K/UL (ref 0–0.2)
BASOPHILS NFR BLD: 1 % (ref 0–2)
BUN SERPL-MCNC: 16 MG/DL (ref 6–20)
CALCIUM SERPL-MCNC: 9.2 MG/DL (ref 8.6–10)
CHLORIDE SERPL-SCNC: 97 MMOL/L (ref 98–107)
CO2 SERPL-SCNC: 26 MMOL/L (ref 22–29)
CREAT SERPL-MCNC: 3.9 MG/DL (ref 0.5–1)
EOSINOPHIL # BLD: 0.25 K/UL (ref 0.05–0.5)
EOSINOPHILS RELATIVE PERCENT: 5 % (ref 0–6)
ERYTHROCYTE [DISTWIDTH] IN BLOOD BY AUTOMATED COUNT: 15.3 % (ref 11.5–15)
GFR, ESTIMATED: 14 ML/MIN/1.73M2
GLUCOSE BLD-MCNC: 107 MG/DL (ref 74–99)
GLUCOSE BLD-MCNC: 124 MG/DL (ref 74–99)
GLUCOSE BLD-MCNC: 181 MG/DL (ref 74–99)
GLUCOSE BLD-MCNC: 258 MG/DL (ref 74–99)
GLUCOSE BLD-MCNC: 321 MG/DL (ref 74–99)
GLUCOSE BLD-MCNC: 419 MG/DL (ref 74–99)
GLUCOSE BLD-MCNC: 77 MG/DL (ref 74–99)
GLUCOSE BLD-MCNC: <40 MG/DL (ref 74–99)
GLUCOSE SERPL-MCNC: 285 MG/DL (ref 74–99)
HCT VFR BLD AUTO: 28 % (ref 34–48)
HGB BLD-MCNC: 8.9 G/DL (ref 11.5–15.5)
IMM GRANULOCYTES # BLD AUTO: <0.03 K/UL (ref 0–0.58)
IMM GRANULOCYTES NFR BLD: 0 % (ref 0–5)
LYMPHOCYTES NFR BLD: 1.29 K/UL (ref 1.5–4)
LYMPHOCYTES RELATIVE PERCENT: 27 % (ref 20–42)
MCH RBC QN AUTO: 29.3 PG (ref 26–35)
MCHC RBC AUTO-ENTMCNC: 31.8 G/DL (ref 32–34.5)
MCV RBC AUTO: 92.1 FL (ref 80–99.9)
MONOCYTES NFR BLD: 0.6 K/UL (ref 0.1–0.95)
MONOCYTES NFR BLD: 12 % (ref 2–12)
NEUTROPHILS NFR BLD: 55 % (ref 43–80)
NEUTS SEG NFR BLD: 2.65 K/UL (ref 1.8–7.3)
PLATELET # BLD AUTO: 181 K/UL (ref 130–450)
PMV BLD AUTO: 10.5 FL (ref 7–12)
POTASSIUM SERPL-SCNC: 4.4 MMOL/L (ref 3.5–5.1)
RBC # BLD AUTO: 3.04 M/UL (ref 3.5–5.5)
SODIUM SERPL-SCNC: 133 MMOL/L (ref 136–145)
WBC OTHER # BLD: 4.8 K/UL (ref 4.5–11.5)

## 2025-06-01 PROCEDURE — 6370000000 HC RX 637 (ALT 250 FOR IP): Performed by: INTERNAL MEDICINE

## 2025-06-01 PROCEDURE — 2700000000 HC OXYGEN THERAPY PER DAY

## 2025-06-01 PROCEDURE — 6360000002 HC RX W HCPCS: Performed by: STUDENT IN AN ORGANIZED HEALTH CARE EDUCATION/TRAINING PROGRAM

## 2025-06-01 PROCEDURE — 6370000000 HC RX 637 (ALT 250 FOR IP): Performed by: NURSE PRACTITIONER

## 2025-06-01 PROCEDURE — 2500000003 HC RX 250 WO HCPCS: Performed by: STUDENT IN AN ORGANIZED HEALTH CARE EDUCATION/TRAINING PROGRAM

## 2025-06-01 PROCEDURE — 85025 COMPLETE CBC W/AUTO DIFF WBC: CPT

## 2025-06-01 PROCEDURE — 6370000000 HC RX 637 (ALT 250 FOR IP): Performed by: STUDENT IN AN ORGANIZED HEALTH CARE EDUCATION/TRAINING PROGRAM

## 2025-06-01 PROCEDURE — 36415 COLL VENOUS BLD VENIPUNCTURE: CPT

## 2025-06-01 PROCEDURE — 80048 BASIC METABOLIC PNL TOTAL CA: CPT

## 2025-06-01 PROCEDURE — 2580000003 HC RX 258: Performed by: INTERNAL MEDICINE

## 2025-06-01 PROCEDURE — 82962 GLUCOSE BLOOD TEST: CPT

## 2025-06-01 PROCEDURE — 2060000000 HC ICU INTERMEDIATE R&B

## 2025-06-01 RX ORDER — INSULIN LISPRO 100 [IU]/ML
5 INJECTION, SOLUTION INTRAVENOUS; SUBCUTANEOUS ONCE
Status: COMPLETED | OUTPATIENT
Start: 2025-06-01 | End: 2025-06-01

## 2025-06-01 RX ORDER — TRAMADOL HYDROCHLORIDE 50 MG/1
50 TABLET ORAL EVERY 6 HOURS PRN
Status: DISCONTINUED | OUTPATIENT
Start: 2025-06-01 | End: 2025-06-05 | Stop reason: HOSPADM

## 2025-06-01 RX ADMIN — SODIUM CHLORIDE, PRESERVATIVE FREE 10 ML: 5 INJECTION INTRAVENOUS at 20:20

## 2025-06-01 RX ADMIN — OXYCODONE AND ACETAMINOPHEN 1 TABLET: 5; 325 TABLET ORAL at 12:32

## 2025-06-01 RX ADMIN — DEXTROSE MONOHYDRATE 125 ML: 10 INJECTION, SOLUTION INTRAVENOUS at 14:38

## 2025-06-01 RX ADMIN — DOCUSATE SODIUM 100 MG: 100 CAPSULE, LIQUID FILLED ORAL at 08:52

## 2025-06-01 RX ADMIN — DIPHENHYDRAMINE HYDROCHLORIDE 25 MG: 50 INJECTION INTRAMUSCULAR; INTRAVENOUS at 20:19

## 2025-06-01 RX ADMIN — GABAPENTIN 100 MG: 100 CAPSULE ORAL at 08:53

## 2025-06-01 RX ADMIN — INSULIN LISPRO 1 UNITS: 100 INJECTION, SOLUTION INTRAVENOUS; SUBCUTANEOUS at 17:42

## 2025-06-01 RX ADMIN — INSULIN LISPRO 2 UNITS: 100 INJECTION, SOLUTION INTRAVENOUS; SUBCUTANEOUS at 17:43

## 2025-06-01 RX ADMIN — TRAMADOL HYDROCHLORIDE 50 MG: 50 TABLET, COATED ORAL at 17:45

## 2025-06-01 RX ADMIN — GABAPENTIN 100 MG: 100 CAPSULE ORAL at 17:43

## 2025-06-01 RX ADMIN — METOPROLOL TARTRATE 12.5 MG: 25 TABLET, FILM COATED ORAL at 08:52

## 2025-06-01 RX ADMIN — OXYCODONE AND ACETAMINOPHEN 1 TABLET: 5; 325 TABLET ORAL at 21:24

## 2025-06-01 RX ADMIN — TRAMADOL HYDROCHLORIDE 50 MG: 50 TABLET, COATED ORAL at 23:43

## 2025-06-01 RX ADMIN — MIDODRINE HYDROCHLORIDE 2.5 MG: 2.5 TABLET ORAL at 08:52

## 2025-06-01 RX ADMIN — ASPIRIN 81 MG: 81 TABLET, COATED ORAL at 08:52

## 2025-06-01 RX ADMIN — DEXTROSE MONOHYDRATE 125 ML: 10 INJECTION, SOLUTION INTRAVENOUS at 14:49

## 2025-06-01 RX ADMIN — INSULIN LISPRO 2 UNITS: 100 INJECTION, SOLUTION INTRAVENOUS; SUBCUTANEOUS at 08:54

## 2025-06-01 RX ADMIN — MIDODRINE HYDROCHLORIDE 2.5 MG: 2.5 TABLET ORAL at 17:43

## 2025-06-01 RX ADMIN — INSULIN LISPRO 4 UNITS: 100 INJECTION, SOLUTION INTRAVENOUS; SUBCUTANEOUS at 20:19

## 2025-06-01 RX ADMIN — INSULIN LISPRO 3 UNITS: 100 INJECTION, SOLUTION INTRAVENOUS; SUBCUTANEOUS at 08:53

## 2025-06-01 RX ADMIN — Medication 325 MG: at 09:00

## 2025-06-01 RX ADMIN — ASPIRIN 81 MG: 81 TABLET, COATED ORAL at 20:19

## 2025-06-01 RX ADMIN — PANTOPRAZOLE SODIUM 40 MG: 40 TABLET, DELAYED RELEASE ORAL at 04:31

## 2025-06-01 RX ADMIN — INSULIN GLARGINE 5 UNITS: 100 INJECTION, SOLUTION SUBCUTANEOUS at 08:53

## 2025-06-01 RX ADMIN — INSULIN LISPRO 5 UNITS: 100 INJECTION, SOLUTION INTRAVENOUS; SUBCUTANEOUS at 04:43

## 2025-06-01 RX ADMIN — INSULIN LISPRO 2 UNITS: 100 INJECTION, SOLUTION INTRAVENOUS; SUBCUTANEOUS at 12:32

## 2025-06-01 RX ADMIN — OXYCODONE AND ACETAMINOPHEN 1 TABLET: 5; 325 TABLET ORAL at 04:31

## 2025-06-01 RX ADMIN — TRAMADOL HYDROCHLORIDE 50 MG: 50 TABLET, COATED ORAL at 08:48

## 2025-06-01 RX ADMIN — SODIUM CHLORIDE, PRESERVATIVE FREE 10 ML: 5 INJECTION INTRAVENOUS at 08:54

## 2025-06-01 RX ADMIN — METOPROLOL TARTRATE 12.5 MG: 25 TABLET, FILM COATED ORAL at 20:20

## 2025-06-01 ASSESSMENT — PAIN SCALES - GENERAL
PAINLEVEL_OUTOF10: 9
PAINLEVEL_OUTOF10: 8
PAINLEVEL_OUTOF10: 7
PAINLEVEL_OUTOF10: 8

## 2025-06-01 ASSESSMENT — PAIN DESCRIPTION - LOCATION
LOCATION: KNEE
LOCATION: ABDOMEN
LOCATION: KNEE

## 2025-06-01 ASSESSMENT — PAIN DESCRIPTION - DESCRIPTORS
DESCRIPTORS: ACHING;DISCOMFORT;DULL
DESCRIPTORS: CRAMPING;DISCOMFORT;ACHING
DESCRIPTORS: ACHING;DISCOMFORT;SORE
DESCRIPTORS: ACHING;DISCOMFORT
DESCRIPTORS: ACHING;CRUSHING;DISCOMFORT

## 2025-06-01 ASSESSMENT — PAIN DESCRIPTION - ORIENTATION
ORIENTATION: LEFT
ORIENTATION: MID
ORIENTATION: LEFT

## 2025-06-01 ASSESSMENT — PAIN DESCRIPTION - PAIN TYPE: TYPE: ACUTE PAIN

## 2025-06-02 LAB
ANION GAP SERPL CALCULATED.3IONS-SCNC: 13 MMOL/L (ref 7–16)
ANION GAP SERPL CALCULATED.3IONS-SCNC: 14 MMOL/L (ref 7–16)
BASOPHILS # BLD: 0.02 K/UL (ref 0–0.2)
BASOPHILS NFR BLD: 0 % (ref 0–2)
BUN SERPL-MCNC: 27 MG/DL (ref 6–20)
BUN SERPL-MCNC: 27 MG/DL (ref 6–20)
CALCIUM SERPL-MCNC: 8.9 MG/DL (ref 8.6–10.2)
CALCIUM SERPL-MCNC: 9.3 MG/DL (ref 8.6–10)
CHLORIDE SERPL-SCNC: 93 MMOL/L (ref 98–107)
CHLORIDE SERPL-SCNC: 95 MMOL/L (ref 98–107)
CO2 SERPL-SCNC: 23 MMOL/L (ref 22–29)
CO2 SERPL-SCNC: 24 MMOL/L (ref 22–29)
CREAT SERPL-MCNC: 6 MG/DL (ref 0.5–1)
CREAT SERPL-MCNC: 6 MG/DL (ref 0.5–1)
EOSINOPHIL # BLD: 0.32 K/UL (ref 0.05–0.5)
EOSINOPHILS RELATIVE PERCENT: 6 % (ref 0–6)
ERYTHROCYTE [DISTWIDTH] IN BLOOD BY AUTOMATED COUNT: 15.3 % (ref 11.5–15)
GFR, ESTIMATED: 8 ML/MIN/1.73M2
GFR, ESTIMATED: 9 ML/MIN/1.73M2
GLUCOSE BLD-MCNC: 101 MG/DL (ref 74–99)
GLUCOSE BLD-MCNC: 139 MG/DL (ref 74–99)
GLUCOSE BLD-MCNC: 267 MG/DL (ref 74–99)
GLUCOSE BLD-MCNC: 359 MG/DL (ref 74–99)
GLUCOSE BLD-MCNC: 458 MG/DL (ref 74–99)
GLUCOSE BLD-MCNC: >500 MG/DL (ref 74–99)
GLUCOSE BLD-MCNC: >500 MG/DL (ref 74–99)
GLUCOSE SERPL-MCNC: 489 MG/DL (ref 74–99)
GLUCOSE SERPL-MCNC: 576 MG/DL (ref 74–99)
HCT VFR BLD AUTO: 25.9 % (ref 34–48)
HGB BLD-MCNC: 8.5 G/DL (ref 11.5–15.5)
IMM GRANULOCYTES # BLD AUTO: <0.03 K/UL (ref 0–0.58)
IMM GRANULOCYTES NFR BLD: 0 % (ref 0–5)
LYMPHOCYTES NFR BLD: 1.11 K/UL (ref 1.5–4)
LYMPHOCYTES RELATIVE PERCENT: 22 % (ref 20–42)
MCH RBC QN AUTO: 29.8 PG (ref 26–35)
MCHC RBC AUTO-ENTMCNC: 32.8 G/DL (ref 32–34.5)
MCV RBC AUTO: 90.9 FL (ref 80–99.9)
MONOCYTES NFR BLD: 0.46 K/UL (ref 0.1–0.95)
MONOCYTES NFR BLD: 9 % (ref 2–12)
NEUTROPHILS NFR BLD: 62 % (ref 43–80)
NEUTS SEG NFR BLD: 3.08 K/UL (ref 1.8–7.3)
PLATELET # BLD AUTO: 231 K/UL (ref 130–450)
PMV BLD AUTO: 9.6 FL (ref 7–12)
POTASSIUM SERPL-SCNC: 5.1 MMOL/L (ref 3.5–5.1)
POTASSIUM SERPL-SCNC: 5.2 MMOL/L (ref 3.5–5)
RBC # BLD AUTO: 2.85 M/UL (ref 3.5–5.5)
SODIUM SERPL-SCNC: 130 MMOL/L (ref 136–145)
SODIUM SERPL-SCNC: 132 MMOL/L (ref 132–146)
WBC OTHER # BLD: 5 K/UL (ref 4.5–11.5)

## 2025-06-02 PROCEDURE — 6370000000 HC RX 637 (ALT 250 FOR IP): Performed by: INTERNAL MEDICINE

## 2025-06-02 PROCEDURE — 85025 COMPLETE CBC W/AUTO DIFF WBC: CPT

## 2025-06-02 PROCEDURE — 2060000000 HC ICU INTERMEDIATE R&B

## 2025-06-02 PROCEDURE — 97535 SELF CARE MNGMENT TRAINING: CPT

## 2025-06-02 PROCEDURE — 80048 BASIC METABOLIC PNL TOTAL CA: CPT

## 2025-06-02 PROCEDURE — 6370000000 HC RX 637 (ALT 250 FOR IP): Performed by: NURSE PRACTITIONER

## 2025-06-02 PROCEDURE — 6370000000 HC RX 637 (ALT 250 FOR IP): Performed by: STUDENT IN AN ORGANIZED HEALTH CARE EDUCATION/TRAINING PROGRAM

## 2025-06-02 PROCEDURE — 2700000000 HC OXYGEN THERAPY PER DAY

## 2025-06-02 PROCEDURE — 97530 THERAPEUTIC ACTIVITIES: CPT

## 2025-06-02 PROCEDURE — 2500000003 HC RX 250 WO HCPCS: Performed by: STUDENT IN AN ORGANIZED HEALTH CARE EDUCATION/TRAINING PROGRAM

## 2025-06-02 PROCEDURE — 82962 GLUCOSE BLOOD TEST: CPT

## 2025-06-02 PROCEDURE — 99232 SBSQ HOSP IP/OBS MODERATE 35: CPT | Performed by: INTERNAL MEDICINE

## 2025-06-02 PROCEDURE — 97165 OT EVAL LOW COMPLEX 30 MIN: CPT

## 2025-06-02 PROCEDURE — 97161 PT EVAL LOW COMPLEX 20 MIN: CPT

## 2025-06-02 PROCEDURE — 6360000002 HC RX W HCPCS: Performed by: STUDENT IN AN ORGANIZED HEALTH CARE EDUCATION/TRAINING PROGRAM

## 2025-06-02 PROCEDURE — 36415 COLL VENOUS BLD VENIPUNCTURE: CPT

## 2025-06-02 RX ORDER — INSULIN GLARGINE 100 [IU]/ML
10 INJECTION, SOLUTION SUBCUTANEOUS DAILY
Status: DISCONTINUED | OUTPATIENT
Start: 2025-06-02 | End: 2025-06-03

## 2025-06-02 RX ORDER — INSULIN LISPRO 100 [IU]/ML
0-8 INJECTION, SOLUTION INTRAVENOUS; SUBCUTANEOUS
Status: DISCONTINUED | OUTPATIENT
Start: 2025-06-02 | End: 2025-06-05 | Stop reason: HOSPADM

## 2025-06-02 RX ADMIN — DIPHENHYDRAMINE HYDROCHLORIDE 25 MG: 50 INJECTION INTRAMUSCULAR; INTRAVENOUS at 05:28

## 2025-06-02 RX ADMIN — INSULIN LISPRO 2 UNITS: 100 INJECTION, SOLUTION INTRAVENOUS; SUBCUTANEOUS at 17:31

## 2025-06-02 RX ADMIN — OXYCODONE AND ACETAMINOPHEN 1 TABLET: 5; 325 TABLET ORAL at 10:52

## 2025-06-02 RX ADMIN — OXYCODONE AND ACETAMINOPHEN 1 TABLET: 5; 325 TABLET ORAL at 17:30

## 2025-06-02 RX ADMIN — GABAPENTIN 100 MG: 100 CAPSULE ORAL at 07:56

## 2025-06-02 RX ADMIN — TRAMADOL HYDROCHLORIDE 50 MG: 50 TABLET, COATED ORAL at 15:08

## 2025-06-02 RX ADMIN — SODIUM CHLORIDE, PRESERVATIVE FREE 10 ML: 5 INJECTION INTRAVENOUS at 07:58

## 2025-06-02 RX ADMIN — ASPIRIN 81 MG: 81 TABLET, COATED ORAL at 20:22

## 2025-06-02 RX ADMIN — DIPHENHYDRAMINE HYDROCHLORIDE 25 MG: 50 INJECTION INTRAMUSCULAR; INTRAVENOUS at 11:38

## 2025-06-02 RX ADMIN — GABAPENTIN 100 MG: 100 CAPSULE ORAL at 17:30

## 2025-06-02 RX ADMIN — TRAMADOL HYDROCHLORIDE 50 MG: 50 TABLET, COATED ORAL at 07:55

## 2025-06-02 RX ADMIN — OXYCODONE AND ACETAMINOPHEN 1 TABLET: 5; 325 TABLET ORAL at 04:30

## 2025-06-02 RX ADMIN — PANTOPRAZOLE SODIUM 40 MG: 40 TABLET, DELAYED RELEASE ORAL at 05:28

## 2025-06-02 RX ADMIN — METOPROLOL TARTRATE 12.5 MG: 25 TABLET, FILM COATED ORAL at 20:22

## 2025-06-02 RX ADMIN — OXYCODONE AND ACETAMINOPHEN 1 TABLET: 5; 325 TABLET ORAL at 23:43

## 2025-06-02 RX ADMIN — SODIUM CHLORIDE, PRESERVATIVE FREE 10 ML: 5 INJECTION INTRAVENOUS at 20:23

## 2025-06-02 RX ADMIN — ASPIRIN 81 MG: 81 TABLET, COATED ORAL at 07:56

## 2025-06-02 RX ADMIN — ONDANSETRON 4 MG: 2 INJECTION, SOLUTION INTRAMUSCULAR; INTRAVENOUS at 04:54

## 2025-06-02 RX ADMIN — DIPHENHYDRAMINE HYDROCHLORIDE 25 MG: 50 INJECTION INTRAMUSCULAR; INTRAVENOUS at 17:32

## 2025-06-02 RX ADMIN — TRAMADOL HYDROCHLORIDE 50 MG: 50 TABLET, COATED ORAL at 22:00

## 2025-06-02 RX ADMIN — INSULIN LISPRO 2 UNITS: 100 INJECTION, SOLUTION INTRAVENOUS; SUBCUTANEOUS at 12:25

## 2025-06-02 RX ADMIN — MIDODRINE HYDROCHLORIDE 2.5 MG: 2.5 TABLET ORAL at 17:30

## 2025-06-02 RX ADMIN — METOPROLOL TARTRATE 12.5 MG: 25 TABLET, FILM COATED ORAL at 07:57

## 2025-06-02 RX ADMIN — INSULIN LISPRO 4 UNITS: 100 INJECTION, SOLUTION INTRAVENOUS; SUBCUTANEOUS at 12:25

## 2025-06-02 RX ADMIN — MIDODRINE HYDROCHLORIDE 2.5 MG: 2.5 TABLET ORAL at 07:56

## 2025-06-02 RX ADMIN — DOCUSATE SODIUM 100 MG: 100 CAPSULE, LIQUID FILLED ORAL at 07:56

## 2025-06-02 RX ADMIN — INSULIN GLARGINE 10 UNITS: 100 INJECTION, SOLUTION SUBCUTANEOUS at 07:57

## 2025-06-02 RX ADMIN — INSULIN LISPRO 8 UNITS: 100 INJECTION, SOLUTION INTRAVENOUS; SUBCUTANEOUS at 06:00

## 2025-06-02 RX ADMIN — INSULIN LISPRO 2 UNITS: 100 INJECTION, SOLUTION INTRAVENOUS; SUBCUTANEOUS at 07:57

## 2025-06-02 ASSESSMENT — PAIN SCALES - GENERAL
PAINLEVEL_OUTOF10: 8
PAINLEVEL_OUTOF10: 3
PAINLEVEL_OUTOF10: 4
PAINLEVEL_OUTOF10: 10
PAINLEVEL_OUTOF10: 5
PAINLEVEL_OUTOF10: 10
PAINLEVEL_OUTOF10: 8

## 2025-06-02 ASSESSMENT — PAIN - FUNCTIONAL ASSESSMENT
PAIN_FUNCTIONAL_ASSESSMENT: ACTIVITIES ARE NOT PREVENTED
PAIN_FUNCTIONAL_ASSESSMENT: PREVENTS OR INTERFERES WITH MANY ACTIVE NOT PASSIVE ACTIVITIES
PAIN_FUNCTIONAL_ASSESSMENT: PREVENTS OR INTERFERES WITH MANY ACTIVE NOT PASSIVE ACTIVITIES

## 2025-06-02 ASSESSMENT — PAIN DESCRIPTION - LOCATION
LOCATION: KNEE;LEG
LOCATION: KNEE;LEG
LOCATION: KNEE
LOCATION: KNEE;LEG
LOCATION: KNEE
LOCATION: KNEE;LEG

## 2025-06-02 ASSESSMENT — PAIN DESCRIPTION - DESCRIPTORS
DESCRIPTORS: ACHING;DISCOMFORT;SORE
DESCRIPTORS: ACHING;DISCOMFORT;SORE
DESCRIPTORS: ACHING;DISCOMFORT;SHARP
DESCRIPTORS: ACHING;SORE;DISCOMFORT
DESCRIPTORS: ACHING;DISCOMFORT;THROBBING
DESCRIPTORS: ACHING;DISCOMFORT;SORE

## 2025-06-02 ASSESSMENT — PAIN DESCRIPTION - ORIENTATION
ORIENTATION: LEFT

## 2025-06-02 NOTE — DISCHARGE INSTR - COC
Continuity of Care Form    Patient Name: Michelle Nettles   :  1985  MRN:  [id number]    Admit date:  2025  Discharge date:  ***    Code Status Order: Full Code   Advance Directives:     Admitting Physician:  Della Nguyen MD  PCP: Rogers Rodríguez MD    Discharging Nurse: ***  Discharging Hospital Unit/Room#: 4507/4507-B  Discharging Unit Phone Number: ***    Emergency Contact:   Extended Emergency Contact Information  Primary Emergency Contact: Eleazar Musaicka  Address: [address] [place] [address] [facility]  Home Phone: [phone]  Mobile Phone: [phone]  Relation: Brother/Sister  Preferred language: English   needed? No  Secondary Emergency Contact: Cain Musa  Address: [address] [place] [address] [place] [address] [facility]  Home Phone: [phone]  Relation: Brother/Sister  Preferred language: English   needed? No    Past Surgical History:  Past Surgical History:   Procedure Laterality Date    ANKLE SURGERY Left 3/7/2025    REMOVAL OF EXTERNAL FIXATOR FROM LEFT LOWER EXTREMITY, OPEN REDUCTION WITH INTERNAL FIXATION LEFT ANKLE TRIMALLEOLAR FRACTURE performed by Peyman Germain MD at [facility] OR     SECTION      COLONOSCOPY N/A 2024    COLONOSCOPY DIAGNOSTIC performed by Balaji Gonzalez MD at [facility] ENDOSCOPY    COLONOSCOPY N/A 3/18/2025    COLONOSCOPY DIAGNOSTIC performed by Madhu Cordova MD at [facility] ENDOSCOPY    CYST INCISION AND DRAINAGE  2011    perirectal abscess. [facility]. Dr. Fleming    DIALYSIS FISTULA CREATION Left 2019    LEFT ARM FISTULAGRAM, BALLOON ANGIOPLASTY performed by Haylee Zaidi MD at [facility] OR    DILATION AND CURETTAGE OF UTERUS      FEMUR SURGERY Left 2025    LEFT MEDIAL FEMORAL CONDYLE OPEN REDUCTION INTERNAL FIXATION performed by Peyman Germain MD at [facility] OR    FRACTURE SURGERY      fracture right ulnar, left tibia, and

## 2025-06-02 NOTE — CARE COORDINATION
Transition of Care Update:  Here for Acute Pancreatitis- resolved, Volume Overload & DKA.  ESRD on HD T-TH-Sat, Nephrology may switch to M-W-F.  Has Lt Arm AV Fistula.  Cardiology signed off.  Endocrinology following due to pt being a Brittle Type 1 Diabetic.  S/p Non-Displaced Fracture of Lt Humerus with ORIF on 5/16.  On 3L O2.  Met with pt in room, re-introduced role of CM & discussed discharge plans.  Pt states that she wears up to 5L O2 through [employer].  She will need an O2 tank delivered prior discharge.  Referral placed in [facility] for [employer]- left a VM for [facility] requesting O2 tank delivery.  Pt is agreeable to HHC, preference [facility].  Referral placed in [facility]Port including HHC Orders in [facility] for [facility] for RN and PT/OT.  [facility] accepts, start of care 6/6.  [facility]  When an O2 tank from [employer] has been delivered & medically stable, discharge Home with [facility].  Transportation will need arranged.  [employer] Form & Facesheet on Transport Envelope in Soft Chart.  CM/SW to follow.      The Plan for Transition of Care is related to the following treatment goals: DME, HHC    The Patient was provided with a choice of provider and agrees   with the discharge plan. [x] Yes [] No    Freedom of choice list was provided with basic dialogue that supports the patient's individualized plan of care/goals, treatment preferences and shares the quality data associated with the providers. [x] Yes [] No

## 2025-06-03 DIAGNOSIS — S72.432A: Primary | ICD-10-CM

## 2025-06-03 LAB
GLUCOSE BLD-MCNC: 143 MG/DL (ref 74–99)
GLUCOSE BLD-MCNC: 173 MG/DL (ref 74–99)
GLUCOSE BLD-MCNC: 322 MG/DL (ref 74–99)
GLUCOSE BLD-MCNC: 327 MG/DL (ref 74–99)

## 2025-06-03 PROCEDURE — 6370000000 HC RX 637 (ALT 250 FOR IP): Performed by: NURSE PRACTITIONER

## 2025-06-03 PROCEDURE — 6370000000 HC RX 637 (ALT 250 FOR IP): Performed by: INTERNAL MEDICINE

## 2025-06-03 PROCEDURE — 2060000000 HC ICU INTERMEDIATE R&B

## 2025-06-03 PROCEDURE — 82962 GLUCOSE BLOOD TEST: CPT

## 2025-06-03 PROCEDURE — 90935 HEMODIALYSIS ONE EVALUATION: CPT

## 2025-06-03 PROCEDURE — 6360000002 HC RX W HCPCS: Performed by: STUDENT IN AN ORGANIZED HEALTH CARE EDUCATION/TRAINING PROGRAM

## 2025-06-03 PROCEDURE — 2500000003 HC RX 250 WO HCPCS: Performed by: STUDENT IN AN ORGANIZED HEALTH CARE EDUCATION/TRAINING PROGRAM

## 2025-06-03 PROCEDURE — 6370000000 HC RX 637 (ALT 250 FOR IP): Performed by: STUDENT IN AN ORGANIZED HEALTH CARE EDUCATION/TRAINING PROGRAM

## 2025-06-03 PROCEDURE — 2700000000 HC OXYGEN THERAPY PER DAY

## 2025-06-03 RX ORDER — MUPIROCIN 20 MG/G
OINTMENT TOPICAL 2 TIMES DAILY
Status: DISCONTINUED | OUTPATIENT
Start: 2025-06-03 | End: 2025-06-05 | Stop reason: HOSPADM

## 2025-06-03 RX ORDER — INSULIN GLARGINE 100 [IU]/ML
5 INJECTION, SOLUTION SUBCUTANEOUS DAILY
Status: DISCONTINUED | OUTPATIENT
Start: 2025-06-03 | End: 2025-06-04

## 2025-06-03 RX ORDER — INSULIN GLARGINE 100 [IU]/ML
5 INJECTION, SOLUTION SUBCUTANEOUS DAILY
Status: DISCONTINUED | OUTPATIENT
Start: 2025-06-04 | End: 2025-06-03

## 2025-06-03 RX ADMIN — TRAMADOL HYDROCHLORIDE 50 MG: 50 TABLET, COATED ORAL at 21:46

## 2025-06-03 RX ADMIN — DIPHENHYDRAMINE HYDROCHLORIDE 25 MG: 50 INJECTION INTRAMUSCULAR; INTRAVENOUS at 10:55

## 2025-06-03 RX ADMIN — SODIUM CHLORIDE, PRESERVATIVE FREE 10 ML: 5 INJECTION INTRAVENOUS at 10:54

## 2025-06-03 RX ADMIN — INSULIN LISPRO 2 UNITS: 100 INJECTION, SOLUTION INTRAVENOUS; SUBCUTANEOUS at 17:26

## 2025-06-03 RX ADMIN — MIDODRINE HYDROCHLORIDE 2.5 MG: 2.5 TABLET ORAL at 10:52

## 2025-06-03 RX ADMIN — MUPIROCIN: 20 OINTMENT TOPICAL at 12:14

## 2025-06-03 RX ADMIN — GABAPENTIN 100 MG: 100 CAPSULE ORAL at 17:14

## 2025-06-03 RX ADMIN — ASPIRIN 81 MG: 81 TABLET, COATED ORAL at 10:52

## 2025-06-03 RX ADMIN — ASPIRIN 81 MG: 81 TABLET, COATED ORAL at 19:54

## 2025-06-03 RX ADMIN — Medication 325 MG: at 11:03

## 2025-06-03 RX ADMIN — OXYCODONE AND ACETAMINOPHEN 1 TABLET: 5; 325 TABLET ORAL at 10:55

## 2025-06-03 RX ADMIN — MIDODRINE HYDROCHLORIDE 2.5 MG: 2.5 TABLET ORAL at 17:14

## 2025-06-03 RX ADMIN — INSULIN LISPRO 6 UNITS: 100 INJECTION, SOLUTION INTRAVENOUS; SUBCUTANEOUS at 06:45

## 2025-06-03 RX ADMIN — METOPROLOL TARTRATE 12.5 MG: 25 TABLET, FILM COATED ORAL at 19:54

## 2025-06-03 RX ADMIN — INSULIN LISPRO 2 UNITS: 100 INJECTION, SOLUTION INTRAVENOUS; SUBCUTANEOUS at 12:15

## 2025-06-03 RX ADMIN — OXYCODONE AND ACETAMINOPHEN 1 TABLET: 5; 325 TABLET ORAL at 19:53

## 2025-06-03 RX ADMIN — DIPHENHYDRAMINE HYDROCHLORIDE 25 MG: 50 INJECTION INTRAMUSCULAR; INTRAVENOUS at 23:43

## 2025-06-03 RX ADMIN — MUPIROCIN: 20 OINTMENT TOPICAL at 19:55

## 2025-06-03 RX ADMIN — PANTOPRAZOLE SODIUM 40 MG: 40 TABLET, DELAYED RELEASE ORAL at 05:13

## 2025-06-03 RX ADMIN — METOPROLOL TARTRATE 12.5 MG: 25 TABLET, FILM COATED ORAL at 10:52

## 2025-06-03 RX ADMIN — INSULIN GLARGINE 5 UNITS: 100 INJECTION, SOLUTION SUBCUTANEOUS at 12:15

## 2025-06-03 RX ADMIN — DIPHENHYDRAMINE HYDROCHLORIDE 25 MG: 50 INJECTION INTRAMUSCULAR; INTRAVENOUS at 17:16

## 2025-06-03 RX ADMIN — SODIUM CHLORIDE, PRESERVATIVE FREE 10 ML: 5 INJECTION INTRAVENOUS at 19:54

## 2025-06-03 RX ADMIN — DIPHENHYDRAMINE HYDROCHLORIDE 25 MG: 50 INJECTION INTRAMUSCULAR; INTRAVENOUS at 00:48

## 2025-06-03 RX ADMIN — INSULIN LISPRO 6 UNITS: 100 INJECTION, SOLUTION INTRAVENOUS; SUBCUTANEOUS at 17:15

## 2025-06-03 RX ADMIN — DOCUSATE SODIUM 100 MG: 100 CAPSULE, LIQUID FILLED ORAL at 10:52

## 2025-06-03 RX ADMIN — GABAPENTIN 100 MG: 100 CAPSULE ORAL at 10:52

## 2025-06-03 RX ADMIN — TRAMADOL HYDROCHLORIDE 50 MG: 50 TABLET, COATED ORAL at 15:26

## 2025-06-03 RX ADMIN — TRAMADOL HYDROCHLORIDE 50 MG: 50 TABLET, COATED ORAL at 05:13

## 2025-06-03 ASSESSMENT — PAIN SCALES - GENERAL
PAINLEVEL_OUTOF10: 10
PAINLEVEL_OUTOF10: 10
PAINLEVEL_OUTOF10: 6
PAINLEVEL_OUTOF10: 4
PAINLEVEL_OUTOF10: 6
PAINLEVEL_OUTOF10: 6
PAINLEVEL_OUTOF10: 2
PAINLEVEL_OUTOF10: 8

## 2025-06-03 ASSESSMENT — PAIN DESCRIPTION - DESCRIPTORS
DESCRIPTORS: ACHING;DISCOMFORT;SORE

## 2025-06-03 ASSESSMENT — PAIN DESCRIPTION - ORIENTATION
ORIENTATION: LEFT

## 2025-06-03 ASSESSMENT — PAIN DESCRIPTION - LOCATION
LOCATION: KNEE;LEG
LOCATION: KNEE;LEG
LOCATION: KNEE
LOCATION: KNEE;LEG
LOCATION: LEG
LOCATION: KNEE;LEG

## 2025-06-03 ASSESSMENT — PAIN - FUNCTIONAL ASSESSMENT
PAIN_FUNCTIONAL_ASSESSMENT: PREVENTS OR INTERFERES WITH MANY ACTIVE NOT PASSIVE ACTIVITIES
PAIN_FUNCTIONAL_ASSESSMENT: ACTIVITIES ARE NOT PREVENTED

## 2025-06-03 ASSESSMENT — PAIN DESCRIPTION - PAIN TYPE: TYPE: ACUTE PAIN

## 2025-06-03 NOTE — CARE COORDINATION
Transition of Care Update:   On 3L O2, her baseline.  [employer] delivered an O2 tank yesterday.  PT & OT's AM-PAC scores yesterday was 16/24.  Plan discharge Home with [facility] (in [facility]).  Transportation will need arranged at discharge.  Call [facility] at #[phone].  HHC Orders in [facility].  [employer] Form & Facesheet on Transport Envelope in Soft Chart.  CM/SW to follow.      13:29  Message sent to Attending in [facility] to see if the patient could discharge today.  Pt is a brittle DM.  MD plans to see the patient later today to re-evaluate pending her evening glucose.

## 2025-06-04 LAB
GLUCOSE BLD-MCNC: 133 MG/DL (ref 74–99)
GLUCOSE BLD-MCNC: 170 MG/DL (ref 74–99)
GLUCOSE BLD-MCNC: 203 MG/DL (ref 74–99)
GLUCOSE BLD-MCNC: 318 MG/DL (ref 74–99)
GLUCOSE BLD-MCNC: 356 MG/DL (ref 74–99)
GLUCOSE BLD-MCNC: 421 MG/DL (ref 74–99)
GLUCOSE BLD-MCNC: 59 MG/DL (ref 74–99)

## 2025-06-04 PROCEDURE — 82962 GLUCOSE BLOOD TEST: CPT

## 2025-06-04 PROCEDURE — 6370000000 HC RX 637 (ALT 250 FOR IP): Performed by: NURSE PRACTITIONER

## 2025-06-04 PROCEDURE — 6360000002 HC RX W HCPCS: Performed by: STUDENT IN AN ORGANIZED HEALTH CARE EDUCATION/TRAINING PROGRAM

## 2025-06-04 PROCEDURE — 97530 THERAPEUTIC ACTIVITIES: CPT

## 2025-06-04 PROCEDURE — 2700000000 HC OXYGEN THERAPY PER DAY

## 2025-06-04 PROCEDURE — 6370000000 HC RX 637 (ALT 250 FOR IP): Performed by: STUDENT IN AN ORGANIZED HEALTH CARE EDUCATION/TRAINING PROGRAM

## 2025-06-04 PROCEDURE — 6370000000 HC RX 637 (ALT 250 FOR IP): Performed by: INTERNAL MEDICINE

## 2025-06-04 PROCEDURE — 2580000003 HC RX 258: Performed by: INTERNAL MEDICINE

## 2025-06-04 PROCEDURE — 2060000000 HC ICU INTERMEDIATE R&B

## 2025-06-04 PROCEDURE — 2500000003 HC RX 250 WO HCPCS: Performed by: STUDENT IN AN ORGANIZED HEALTH CARE EDUCATION/TRAINING PROGRAM

## 2025-06-04 RX ORDER — INSULIN LISPRO 100 [IU]/ML
3 INJECTION, SOLUTION INTRAVENOUS; SUBCUTANEOUS
Status: DISCONTINUED | OUTPATIENT
Start: 2025-06-04 | End: 2025-06-04

## 2025-06-04 RX ORDER — INSULIN LISPRO 100 [IU]/ML
2 INJECTION, SOLUTION INTRAVENOUS; SUBCUTANEOUS
Status: DISCONTINUED | OUTPATIENT
Start: 2025-06-05 | End: 2025-06-05 | Stop reason: HOSPADM

## 2025-06-04 RX ORDER — INSULIN GLARGINE 100 [IU]/ML
3 INJECTION, SOLUTION SUBCUTANEOUS NIGHTLY
Status: DISCONTINUED | OUTPATIENT
Start: 2025-06-04 | End: 2025-06-05 | Stop reason: HOSPADM

## 2025-06-04 RX ORDER — INSULIN GLARGINE 100 [IU]/ML
5 INJECTION, SOLUTION SUBCUTANEOUS NIGHTLY
Status: DISCONTINUED | OUTPATIENT
Start: 2025-06-04 | End: 2025-06-04

## 2025-06-04 RX ADMIN — HYDRALAZINE HYDROCHLORIDE 10 MG: 20 INJECTION INTRAMUSCULAR; INTRAVENOUS at 20:02

## 2025-06-04 RX ADMIN — INSULIN GLARGINE 5 UNITS: 100 INJECTION, SOLUTION SUBCUTANEOUS at 08:30

## 2025-06-04 RX ADMIN — ASPIRIN 81 MG: 81 TABLET, COATED ORAL at 20:02

## 2025-06-04 RX ADMIN — MUPIROCIN: 20 OINTMENT TOPICAL at 19:59

## 2025-06-04 RX ADMIN — OXYCODONE AND ACETAMINOPHEN 1 TABLET: 5; 325 TABLET ORAL at 08:30

## 2025-06-04 RX ADMIN — GABAPENTIN 100 MG: 100 CAPSULE ORAL at 17:12

## 2025-06-04 RX ADMIN — MIDODRINE HYDROCHLORIDE 2.5 MG: 2.5 TABLET ORAL at 17:11

## 2025-06-04 RX ADMIN — TRAMADOL HYDROCHLORIDE 50 MG: 50 TABLET, COATED ORAL at 19:56

## 2025-06-04 RX ADMIN — GABAPENTIN 100 MG: 100 CAPSULE ORAL at 08:29

## 2025-06-04 RX ADMIN — ASPIRIN 81 MG: 81 TABLET, COATED ORAL at 08:29

## 2025-06-04 RX ADMIN — INSULIN LISPRO 3 UNITS: 100 INJECTION, SOLUTION INTRAVENOUS; SUBCUTANEOUS at 12:52

## 2025-06-04 RX ADMIN — SODIUM CHLORIDE, PRESERVATIVE FREE 10 ML: 5 INJECTION INTRAVENOUS at 20:00

## 2025-06-04 RX ADMIN — TRAMADOL HYDROCHLORIDE 50 MG: 50 TABLET, COATED ORAL at 05:12

## 2025-06-04 RX ADMIN — METOPROLOL TARTRATE 12.5 MG: 25 TABLET, FILM COATED ORAL at 20:02

## 2025-06-04 RX ADMIN — MUPIROCIN: 20 OINTMENT TOPICAL at 08:38

## 2025-06-04 RX ADMIN — INSULIN LISPRO 2 UNITS: 100 INJECTION, SOLUTION INTRAVENOUS; SUBCUTANEOUS at 08:34

## 2025-06-04 RX ADMIN — MIDODRINE HYDROCHLORIDE 2.5 MG: 2.5 TABLET ORAL at 08:29

## 2025-06-04 RX ADMIN — DOCUSATE SODIUM 100 MG: 100 CAPSULE, LIQUID FILLED ORAL at 08:30

## 2025-06-04 RX ADMIN — SODIUM CHLORIDE, PRESERVATIVE FREE 10 ML: 5 INJECTION INTRAVENOUS at 08:35

## 2025-06-04 RX ADMIN — HYDRALAZINE HYDROCHLORIDE 10 MG: 20 INJECTION INTRAMUSCULAR; INTRAVENOUS at 05:12

## 2025-06-04 RX ADMIN — INSULIN LISPRO 6 UNITS: 100 INJECTION, SOLUTION INTRAVENOUS; SUBCUTANEOUS at 12:53

## 2025-06-04 RX ADMIN — DEXTROSE MONOHYDRATE 125 ML: 10 INJECTION, SOLUTION INTRAVENOUS at 16:57

## 2025-06-04 RX ADMIN — INSULIN GLARGINE 3 UNITS: 100 INJECTION, SOLUTION SUBCUTANEOUS at 21:55

## 2025-06-04 RX ADMIN — DIPHENHYDRAMINE HYDROCHLORIDE 25 MG: 50 INJECTION INTRAMUSCULAR; INTRAVENOUS at 15:35

## 2025-06-04 RX ADMIN — OXYCODONE AND ACETAMINOPHEN 1 TABLET: 5; 325 TABLET ORAL at 23:18

## 2025-06-04 RX ADMIN — PANTOPRAZOLE SODIUM 40 MG: 40 TABLET, DELAYED RELEASE ORAL at 05:12

## 2025-06-04 RX ADMIN — OXYCODONE AND ACETAMINOPHEN 1 TABLET: 5; 325 TABLET ORAL at 17:11

## 2025-06-04 RX ADMIN — INSULIN LISPRO 8 UNITS: 100 INJECTION, SOLUTION INTRAVENOUS; SUBCUTANEOUS at 08:34

## 2025-06-04 RX ADMIN — METOPROLOL TARTRATE 12.5 MG: 25 TABLET, FILM COATED ORAL at 08:30

## 2025-06-04 RX ADMIN — DIPHENHYDRAMINE HYDROCHLORIDE 25 MG: 50 INJECTION INTRAMUSCULAR; INTRAVENOUS at 21:55

## 2025-06-04 ASSESSMENT — PAIN DESCRIPTION - DESCRIPTORS
DESCRIPTORS: ACHING;DISCOMFORT;PRESSURE
DESCRIPTORS: ACHING;DISCOMFORT;SORE

## 2025-06-04 ASSESSMENT — PAIN DESCRIPTION - ORIENTATION
ORIENTATION: LEFT

## 2025-06-04 ASSESSMENT — PAIN SCALES - GENERAL
PAINLEVEL_OUTOF10: 7
PAINLEVEL_OUTOF10: 8
PAINLEVEL_OUTOF10: 6
PAINLEVEL_OUTOF10: 9
PAINLEVEL_OUTOF10: 8

## 2025-06-04 ASSESSMENT — PAIN - FUNCTIONAL ASSESSMENT
PAIN_FUNCTIONAL_ASSESSMENT: ACTIVITIES ARE NOT PREVENTED

## 2025-06-04 ASSESSMENT — PAIN DESCRIPTION - LOCATION
LOCATION: LEG
LOCATION: KNEE;LEG
LOCATION: LEG
LOCATION: LEG

## 2025-06-04 ASSESSMENT — PAIN DESCRIPTION - PAIN TYPE: TYPE: ACUTE PAIN

## 2025-06-04 NOTE — DISCHARGE INSTRUCTIONS
[facility] Department of Orthopedic Surgery  [address] [facility] [address]    Dr. Jorge Moore, DO         MD Dr. Jorge Mensah MD Frank Ansevin, PA-C Sara Zatchok, PA-C Tyler Tsangaris PA-C      Orthopaedics Discharge Instructions   Weight bearing Status - Non-weight bearing - on left lower Extremity  Pain medication Per Prescriptions  Contact Office for Medication Refill- [phone]  Office can refill pain med every 7 days  If patient discharging to facility then pain control will be continued per facility physician  Ice to operative/injured site for 15-30 minutes of each hour for next 5 days    Recommend that you continue to ice the area 2-3 times per day after this   Elevate operative/injured limb on 2 pillows at home  Goal is to have limb above the heart if able  Continue DVT Prophylaxis (blood clot prevention) as Prescribed: Aspirin  Fracture Care -continue hinged brace, can start to advance range of motion by 10 degrees every week    Follow Up in Office in 4 weeks, around July 1    Call the office at [phone] or directions or with any questions.  Watch for these significant complications.  Call physician if they or any other problems occur:  Fever over 101°, redness, swelling or warmth at the operative site  Unrelieved nausea    Foul smelling or cloudy drainage at the operative site   Unrelieved pain    Blood soaked dressing. (Some oozing may be normal)     Numb, pale, blue, cold or tingling extremity        It is the Department of Orthopaedic Trauma's standard of practice that providers will de-escalate(wean) all patients from narcotic(opioid) medications during the post-operative period.   We provide multimodal pain control but opioid medications are tapered in all of our patients.  If patient requires referral to pain management for prolonged taper off of opioid pain medication we will facilitate this process.

## 2025-06-04 NOTE — CARE COORDINATION
Transition of Care Update:  Labs noted, chart reviewed.   this am.  On 3L O2, her baseline O2 through [employer].  Met with pt in room this am.  Diabetic Educator consulted this am due to Brittle DM.  HD M-W-F while Inpatient.  Normally she gets HD T-TH-Sat at [facility] in [facility].  Patient would like schedule switched to M-W-F around 11am.  Called [facility] in [facility] regarding request to change her chair date & time, staff report that once a chair time becomes available she will be notified.  When medically stable, plan discharge Home with [facility] (in [place]).  Transportation will need arranged at discharge.  Call [employer] at #[phone].  [facility] Orders in [facility].  [facility] Form & Facesheet on Transport Envelope in Soft Chart.  CM/SW to follow.     15:46  Called Dr. Dyer's [facility]'s office @  #[phone] to attempt to get scheduled sooner than September, his office is unable to schedule the patient any sooner due to one of their physicians being out of the country.

## 2025-06-05 ENCOUNTER — PATIENT MESSAGE (OUTPATIENT)
Dept: PRIMARY CARE CLINIC | Age: 40
End: 2025-06-05

## 2025-06-05 VITALS
SYSTOLIC BLOOD PRESSURE: 159 MMHG | RESPIRATION RATE: 16 BRPM | OXYGEN SATURATION: 99 % | HEIGHT: 59 IN | BODY MASS INDEX: 27.16 KG/M2 | WEIGHT: 134.7 LBS | DIASTOLIC BLOOD PRESSURE: 80 MMHG | TEMPERATURE: 97.9 F | HEART RATE: 101 BPM

## 2025-06-05 PROBLEM — E11.65 POORLY CONTROLLED TYPE 2 DIABETES MELLITUS (HCC): Status: RESOLVED | Noted: 2024-03-22 | Resolved: 2025-06-05

## 2025-06-05 PROBLEM — Z87.19 HISTORY OF PANCREATITIS: Status: ACTIVE | Noted: 2025-06-05

## 2025-06-05 LAB
ANION GAP SERPL CALCULATED.3IONS-SCNC: 13 MMOL/L (ref 7–16)
BUN SERPL-MCNC: 49 MG/DL (ref 6–20)
CALCIUM SERPL-MCNC: 9 MG/DL (ref 8.6–10)
CHLORIDE SERPL-SCNC: 95 MMOL/L (ref 98–107)
CO2 SERPL-SCNC: 22 MMOL/L (ref 22–29)
CREAT SERPL-MCNC: 7.9 MG/DL (ref 0.5–1)
GFR, ESTIMATED: 6 ML/MIN/1.73M2
GLUCOSE BLD-MCNC: 179 MG/DL (ref 74–99)
GLUCOSE BLD-MCNC: 199 MG/DL (ref 74–99)
GLUCOSE BLD-MCNC: 340 MG/DL (ref 74–99)
GLUCOSE BLD-MCNC: 377 MG/DL (ref 74–99)
GLUCOSE BLD-MCNC: 442 MG/DL (ref 74–99)
GLUCOSE BLD-MCNC: 443 MG/DL (ref 74–99)
GLUCOSE SERPL-MCNC: 452 MG/DL (ref 74–99)
POTASSIUM SERPL-SCNC: 6.4 MMOL/L (ref 3.5–5.1)
SODIUM SERPL-SCNC: 130 MMOL/L (ref 136–145)

## 2025-06-05 PROCEDURE — 6370000000 HC RX 637 (ALT 250 FOR IP)

## 2025-06-05 PROCEDURE — 80048 BASIC METABOLIC PNL TOTAL CA: CPT

## 2025-06-05 PROCEDURE — 6370000000 HC RX 637 (ALT 250 FOR IP): Performed by: INTERNAL MEDICINE

## 2025-06-05 PROCEDURE — 2700000000 HC OXYGEN THERAPY PER DAY

## 2025-06-05 PROCEDURE — 82962 GLUCOSE BLOOD TEST: CPT

## 2025-06-05 PROCEDURE — 90935 HEMODIALYSIS ONE EVALUATION: CPT

## 2025-06-05 PROCEDURE — 6370000000 HC RX 637 (ALT 250 FOR IP): Performed by: STUDENT IN AN ORGANIZED HEALTH CARE EDUCATION/TRAINING PROGRAM

## 2025-06-05 PROCEDURE — 6370000000 HC RX 637 (ALT 250 FOR IP): Performed by: NURSE PRACTITIONER

## 2025-06-05 PROCEDURE — 6360000002 HC RX W HCPCS: Performed by: STUDENT IN AN ORGANIZED HEALTH CARE EDUCATION/TRAINING PROGRAM

## 2025-06-05 PROCEDURE — 2500000003 HC RX 250 WO HCPCS: Performed by: STUDENT IN AN ORGANIZED HEALTH CARE EDUCATION/TRAINING PROGRAM

## 2025-06-05 PROCEDURE — 36415 COLL VENOUS BLD VENIPUNCTURE: CPT

## 2025-06-05 RX ORDER — MIDODRINE HYDROCHLORIDE 2.5 MG/1
2.5 TABLET ORAL 2 TIMES DAILY WITH MEALS
Qty: 90 TABLET | Refills: 3 | Status: SHIPPED | OUTPATIENT
Start: 2025-06-05

## 2025-06-05 RX ORDER — NICOTINE 21 MG/24HR
1 PATCH, TRANSDERMAL 24 HOURS TRANSDERMAL EVERY 24 HOURS
Qty: 30 PATCH | Refills: 0 | Status: SHIPPED | OUTPATIENT
Start: 2025-06-05

## 2025-06-05 RX ORDER — OXYCODONE AND ACETAMINOPHEN 5; 325 MG/1; MG/1
1 TABLET ORAL EVERY 8 HOURS PRN
Qty: 15 TABLET | Refills: 0 | Status: SHIPPED | OUTPATIENT
Start: 2025-06-05 | End: 2025-06-05

## 2025-06-05 RX ORDER — DIPHENHYDRAMINE HCL 25 MG
25 TABLET ORAL EVERY 6 HOURS PRN
Qty: 30 TABLET | Refills: 0 | Status: SHIPPED | OUTPATIENT
Start: 2025-06-05

## 2025-06-05 RX ORDER — LIDOCAINE 4 G/G
1 PATCH TOPICAL DAILY
Qty: 30 EACH | Refills: 0 | Status: SHIPPED | OUTPATIENT
Start: 2025-06-05

## 2025-06-05 RX ORDER — INSULIN LISPRO 100 [IU]/ML
8 INJECTION, SOLUTION INTRAVENOUS; SUBCUTANEOUS ONCE
Status: COMPLETED | OUTPATIENT
Start: 2025-06-05 | End: 2025-06-05

## 2025-06-05 RX ORDER — OXYCODONE AND ACETAMINOPHEN 5; 325 MG/1; MG/1
1 TABLET ORAL EVERY 6 HOURS PRN
Qty: 20 TABLET | Refills: 0 | Status: SHIPPED | OUTPATIENT
Start: 2025-06-05 | End: 2025-06-10

## 2025-06-05 RX ORDER — MUPIROCIN 20 MG/G
OINTMENT TOPICAL
Qty: 1 EACH | Refills: 0 | Status: SHIPPED | OUTPATIENT
Start: 2025-06-05

## 2025-06-05 RX ORDER — INSULIN GLARGINE 100 [IU]/ML
5 INJECTION, SOLUTION SUBCUTANEOUS EVERY MORNING
Qty: 10 ML | Refills: 3 | Status: SHIPPED | OUTPATIENT
Start: 2025-06-05

## 2025-06-05 RX ORDER — METOPROLOL TARTRATE 25 MG/1
12.5 TABLET, FILM COATED ORAL 2 TIMES DAILY
Qty: 60 TABLET | Refills: 3 | Status: SHIPPED | OUTPATIENT
Start: 2025-06-05

## 2025-06-05 RX ORDER — INSULIN LISPRO 100 [IU]/ML
2 INJECTION, SOLUTION INTRAVENOUS; SUBCUTANEOUS
Qty: 10 EACH | Refills: 0 | Status: SHIPPED | OUTPATIENT
Start: 2025-06-05

## 2025-06-05 RX ORDER — LIDOCAINE 4 G/G
1 PATCH TOPICAL DAILY
Status: DISCONTINUED | OUTPATIENT
Start: 2025-06-05 | End: 2025-06-05 | Stop reason: HOSPADM

## 2025-06-05 RX ADMIN — OXYCODONE AND ACETAMINOPHEN 1 TABLET: 5; 325 TABLET ORAL at 15:23

## 2025-06-05 RX ADMIN — DOCUSATE SODIUM 100 MG: 100 CAPSULE, LIQUID FILLED ORAL at 08:47

## 2025-06-05 RX ADMIN — TRAMADOL HYDROCHLORIDE 50 MG: 50 TABLET, COATED ORAL at 20:33

## 2025-06-05 RX ADMIN — TRAMADOL HYDROCHLORIDE 50 MG: 50 TABLET, COATED ORAL at 09:06

## 2025-06-05 RX ADMIN — PANTOPRAZOLE SODIUM 40 MG: 40 TABLET, DELAYED RELEASE ORAL at 05:18

## 2025-06-05 RX ADMIN — SODIUM CHLORIDE, PRESERVATIVE FREE 10 ML: 5 INJECTION INTRAVENOUS at 08:51

## 2025-06-05 RX ADMIN — TRAMADOL HYDROCHLORIDE 50 MG: 50 TABLET, COATED ORAL at 03:08

## 2025-06-05 RX ADMIN — INSULIN LISPRO 8 UNITS: 100 INJECTION, SOLUTION INTRAVENOUS; SUBCUTANEOUS at 20:33

## 2025-06-05 RX ADMIN — DIPHENHYDRAMINE HYDROCHLORIDE 25 MG: 50 INJECTION INTRAMUSCULAR; INTRAVENOUS at 06:07

## 2025-06-05 RX ADMIN — INSULIN LISPRO 2 UNITS: 100 INJECTION, SOLUTION INTRAVENOUS; SUBCUTANEOUS at 12:13

## 2025-06-05 RX ADMIN — INSULIN LISPRO 2 UNITS: 100 INJECTION, SOLUTION INTRAVENOUS; SUBCUTANEOUS at 08:46

## 2025-06-05 RX ADMIN — MUPIROCIN: 20 OINTMENT TOPICAL at 08:50

## 2025-06-05 RX ADMIN — INSULIN GLARGINE 3 UNITS: 100 INJECTION, SOLUTION SUBCUTANEOUS at 20:33

## 2025-06-05 RX ADMIN — MUPIROCIN: 20 OINTMENT TOPICAL at 20:36

## 2025-06-05 RX ADMIN — METOPROLOL TARTRATE 12.5 MG: 25 TABLET, FILM COATED ORAL at 20:33

## 2025-06-05 RX ADMIN — GABAPENTIN 100 MG: 100 CAPSULE ORAL at 08:47

## 2025-06-05 RX ADMIN — MIDODRINE HYDROCHLORIDE 2.5 MG: 2.5 TABLET ORAL at 08:47

## 2025-06-05 RX ADMIN — Medication 325 MG: at 08:47

## 2025-06-05 RX ADMIN — METOPROLOL TARTRATE 12.5 MG: 25 TABLET, FILM COATED ORAL at 08:47

## 2025-06-05 RX ADMIN — ASPIRIN 81 MG: 81 TABLET, COATED ORAL at 20:32

## 2025-06-05 RX ADMIN — ASPIRIN 81 MG: 81 TABLET, COATED ORAL at 08:47

## 2025-06-05 RX ADMIN — OXYCODONE AND ACETAMINOPHEN 1 TABLET: 5; 325 TABLET ORAL at 05:18

## 2025-06-05 RX ADMIN — INSULIN LISPRO 8 UNITS: 100 INJECTION, SOLUTION INTRAVENOUS; SUBCUTANEOUS at 07:02

## 2025-06-05 ASSESSMENT — PAIN DESCRIPTION - LOCATION
LOCATION: KNEE
LOCATION: KNEE;LEG

## 2025-06-05 ASSESSMENT — PAIN DESCRIPTION - DESCRIPTORS
DESCRIPTORS: ACHING;DISCOMFORT
DESCRIPTORS: ACHING
DESCRIPTORS: ACHING;DISCOMFORT;SORE
DESCRIPTORS: ACHING

## 2025-06-05 ASSESSMENT — PAIN DESCRIPTION - ORIENTATION
ORIENTATION: LEFT

## 2025-06-05 ASSESSMENT — PAIN SCALES - GENERAL
PAINLEVEL_OUTOF10: 2
PAINLEVEL_OUTOF10: 2
PAINLEVEL_OUTOF10: 9
PAINLEVEL_OUTOF10: 8
PAINLEVEL_OUTOF10: 6

## 2025-06-05 NOTE — CARE COORDINATION
Transition of Care Update:  HD at Noon.  Lantus adjusted yesterday due to erratic blood sugars.  Last night  then dropped to 59.  This am BS was 452, now 179.  Plan discharge Home with [facility] (in [facility]  HHC Orders in [facility] already sent to [facility].  Transportation will need arranged.  Call [facility] at #[phone] or [employer] at #[phone], pt requesting wheelchair with leg rests.  [employer] Form & Facesheet on Transport Envelope in Soft Chart.  CM/SW to follow.     12:43  Messaged physician in [employer] to verify if patient will be discharged today.     13:56  Discharge Order in Epic.  Called [employer] to set up transportation after HD.  Pt will be done with HD around 3pm per HD nurse.  Transportation arranged at 5pm (tentatively between 5 - 7pm), she will receive a text 10 minutes prior arrival & must be ready.  Reference # [id number].  Also set up transportation for tomorrow's PCP appointment at 3:45 pm.   time will be 2:45 pm, Reference # [id number].  Notified pt & Nursing regarding transport time.  Jessica at [facility] also updated.

## 2025-06-05 NOTE — TELEPHONE ENCOUNTER
Last Appointment:  2/4/2025  Future Appointments   Date Time Provider Department Center   6/6/2025  3:45 PM Rogers Rodríguez MD [facility]  BS E[facility] DEP   6/12/2025  3:30 PM SCHEDULE, [facility]   9/9/2025 12:40 PM Angelina Benitez, KEMI - CNP [facility] ENDO HP

## 2025-06-05 NOTE — DISCHARGE SUMMARY
hypertension presented to ED with complaints of abdominal pain, nausea, patient also skipped dialysis on Saturday due to weakness.Upon ED workup, found to have a hemoglobin of 6.7, glucose over 600, sodium of 130, BUN of 54, creatinine 11.2, lipase of 602, troponin of 243, proBNP of 23,330, beta-hydroxybutyrate greater than 4.5, hemoglobin A1c 7.5, RVP/COVID-19 PCR negative. CTA pulmonary with contrast no pulmonary emboli, pulmonary edema. CT abdomen pelvis with IV contrast showed a acute pancreatitis with peripancreatic fat stranding and inflammatory changes. No gastric fluid collection. Correlate with amylase and lipase value. Cholelithiasis without CT evidence of acute cholecystitis. Severe chronic atrophy of the bilateral kidneys suggestive chronic renal failure. No hydronephrosis.   Admitted to MICU for DKA. Patient was transferred out 5/28. Patient received 1 unit PRBC transfused on 5/26.  Patient pancreatitis has resolved, patient had episode of NSVT for which EP is consulted.  This was deemed to be more likely an artifact. Patient was started on metoprolol. Patient continues to have episodes of hypoglycemia with blood sugars elevated to 400s/500s. Endocrinology was initially following. She was on 5 units of Lantus and 2 units of lispro premeals along with sliding scale. Patient's is has VERY BRITTLE diabetes. She was instructed regarding checking blood sugars regularly, follow up with PCP and endocrinology.     Discharge Exam:  General Appearance: alert and oriented to person, place and time and in no acute distress  Skin: warm and dry  Head: normocephalic and atraumatic  Eyes: pupils equal, round, and reactive to light, extraocular eye movements intact, conjunctivae normal  Neck: neck supple and non tender without mass   Pulmonary/Chest: clear to auscultation bilaterally- no wheezes, rales or rhonchi, normal air movement, no respiratory distress  Cardiovascular: normal rate, normal S1 and S2 and no carotid

## 2025-06-05 NOTE — FLOWSHEET NOTE
06/05/25 1446   Vital Signs   /68   Temp 98 °F (36.7 °C)   Pulse 96   Respirations 18   SpO2 100 %   Weight - Scale 61.1 kg (134 lb 11.2 oz)   Weight Method Bed scale   Percent Weight Change -2.08   Post-Hemodialysis Assessment   Post-Treatment Procedures Blood returned;Access bleeding time < 10 minutes   Machine Disinfection Process Acid/Vinegar Clean;Heat Disinfect;Exterior Machine Disinfection   Rinseback Volume (ml) 300 ml   Blood Volume Processed (Liters) 90.9 L   Dialyzer Clearance Heavily streaked   Duration of Treatment (minutes) 240 minutes   Heparin Amount Administered During Treatment (mL) 0 mL   Hemodialysis Intake (ml) 300 ml   Hemodialysis Output (ml) 2800 ml   NET Removed (ml) 2500   Tolerated Treatment Good   Interventions Taken Ultrafiltration goal decreased   Patient Response to Treatment tolerated tx well, 2.5L fluid removed w/o diff   Bilateral Breath Sounds Diminished   Edema Right lower extremity;Left lower extremity   Edema Generalized +1   RLE Edema +1;Pitting   LLE Edema +1;Pitting   Periorbital Edema +1   Time Off 1436   Patient Disposition Return to room   Observations & Evaluations   Level of Consciousness 0   Oriented X 3   Heart Rhythm Regular   Respiratory Quality/Effort Unlabored   O2 Device Nasal cannula   Skin Condition/Temp Dry;Warm   Abdomen Inspection Rounded;Soft     Tolerated  HD 4 hr on 2 k 2.5 ca bath, 2500 ml removed with out difficulty.  Needles removed and pressure held until hemostasis achieved and dressing applied and remained CDI. Pt alert and vitals stable at time of transport, report called to floor RN, patient remains in care of staff.

## 2025-06-06 ENCOUNTER — CARE COORDINATION (OUTPATIENT)
Dept: CARE COORDINATION | Age: 40
End: 2025-06-06

## 2025-06-06 ENCOUNTER — TELEPHONE (OUTPATIENT)
Dept: PRIMARY CARE CLINIC | Age: 40
End: 2025-06-06

## 2025-06-06 NOTE — CARE COORDINATION
Care Transitions Note    Initial Call - Call within 2 business days of discharge: Yes    First attempt to reach patient for transitions of care follow up. Unable to reach patient.    Outreach Attempts:   HIPAA compliant voicemail left for patient.   Spoke with Marta Shoemaker at [facility], she confirmed SOC visit scheduled for today.     Patient: Michelle Nettles    Patient : 1985   MRN: [id number]    Reason for Admission: DKA, T1D  Discharge Date: 25  RURS: Readmission Risk Score: 42.4    Last Discharge Facility       Date Complaint Diagnosis Description Type Department Provider    25 Nausea Diabetic ketoacidosis without coma associated with type 2 diabetes mellitus (HCC) ... ED to Hosp-Admission (Discharged) (ADMITTED) [facility] P[facility] Ana Fleming, Cassie Hernandez,...            Was this an external facility discharge? No    Follow Up Appointment:   Patient has hospital follow up appointment scheduled within 7 days of discharge. At the time of the call patient was scheduled for this afternoon at 3:45 with Dr. Rodríguez, reminded her of that appointment. Appointment has since been canceled by the patient.   Future Appointments         Provider Specialty Dept Phone    2025 3:30 PM SCHEDULE, [facility] Spiritual Services [phone]    2025 12:40 PM Angelina Benitez, KEMI - CNP Endocrinology [phone]            Plan for follow-up on next business day.      Sarah Yin RN

## 2025-06-06 NOTE — PLAN OF CARE
Problem: Chronic Conditions and Co-morbidities  Goal: Patient's chronic conditions and co-morbidity symptoms are monitored and maintained or improved  5/27/2025 1136 by Hiren Salazar RN  Outcome: Progressing  Flowsheets (Taken 5/27/2025 1136)  Care Plan - Patient's Chronic Conditions and Co-Morbidity Symptoms are Monitored and Maintained or Improved:   Monitor and assess patient's chronic conditions and comorbid symptoms for stability, deterioration, or improvement   Collaborate with multidisciplinary team to address chronic and comorbid conditions and prevent exacerbation or deterioration   Update acute care plan with appropriate goals if chronic or comorbid symptoms are exacerbated and prevent overall improvement and discharge  5/26/2025 2239 by Heydi Park RN  Outcome: Progressing     Problem: Discharge Planning  Goal: Discharge to home or other facility with appropriate resources  5/26/2025 2239 by Heydi Park RN  Outcome: Progressing     Problem: Safety - Adult  Goal: Free from fall injury  5/27/2025 1136 by Hiren Salazar RN  Outcome: Progressing  Flowsheets (Taken 5/27/2025 1136)  Free From Fall Injury:   Based on caregiver fall risk screen, instruct family/caregiver to ask for assistance with transferring infant if caregiver noted to have fall risk factors   Instruct family/caregiver on patient safety  5/26/2025 2239 by Heydi Park RN  Outcome: Progressing     Problem: ABCDS Injury Assessment  Goal: Absence of physical injury  5/26/2025 2239 by Heydi Park RN  Outcome: Progressing     Problem: Skin/Tissue Integrity  Goal: Skin integrity remains intact  Description: 1.  Monitor for areas of redness and/or skin breakdown2.  Assess vascular access sites hourly3.  Every 4-6 hours minimum:  Change oxygen saturation probe site4.  Every 4-6 hours:  If on nasal continuous positive airway pressure, respiratory therapy assess nares and determine need for appliance change or resting 
  Problem: Chronic Conditions and Co-morbidities  Goal: Patient's chronic conditions and co-morbidity symptoms are monitored and maintained or improved  5/28/2025 0018 by Suzan Vu, RN  Outcome: Progressing  Flowsheets (Taken 5/27/2025 2000)  Care Plan - Patient's Chronic Conditions and Co-Morbidity Symptoms are Monitored and Maintained or Improved:   Monitor and assess patient's chronic conditions and comorbid symptoms for stability, deterioration, or improvement   Collaborate with multidisciplinary team to address chronic and comorbid conditions and prevent exacerbation or deterioration   Update acute care plan with appropriate goals if chronic or comorbid symptoms are exacerbated and prevent overall improvement and discharge  5/27/2025 1136 by Hiren Salazar, RN  Outcome: Progressing  Flowsheets (Taken 5/27/2025 1136)  Care Plan - Patient's Chronic Conditions and Co-Morbidity Symptoms are Monitored and Maintained or Improved:   Monitor and assess patient's chronic conditions and comorbid symptoms for stability, deterioration, or improvement   Collaborate with multidisciplinary team to address chronic and comorbid conditions and prevent exacerbation or deterioration   Update acute care plan with appropriate goals if chronic or comorbid symptoms are exacerbated and prevent overall improvement and discharge     Problem: Discharge Planning  Goal: Discharge to home or other facility with appropriate resources  Outcome: Progressing  Flowsheets (Taken 5/27/2025 2000)  Discharge to home or other facility with appropriate resources:   Identify barriers to discharge with patient and caregiver   Arrange for needed discharge resources and transportation as appropriate   Identify discharge learning needs (meds, wound care, etc)   Refer to discharge planning if patient needs post-hospital services based on physician order or complex needs related to functional status, cognitive ability or social support system   
  Problem: Chronic Conditions and Co-morbidities  Goal: Patient's chronic conditions and co-morbidity symptoms are monitored and maintained or improved  5/30/2025 2043 by iVda Mathews RN  Outcome: Progressing  5/30/2025 1542 by Maty Hair RN  Outcome: Progressing     Problem: Safety - Adult  Goal: Free from fall injury  5/30/2025 2043 by Vida Mathews RN  Outcome: Progressing  5/30/2025 1542 by Maty Hair RN  Outcome: Progressing     
  Problem: Chronic Conditions and Co-morbidities  Goal: Patient's chronic conditions and co-morbidity symptoms are monitored and maintained or improved  5/31/2025 2106 by Vida Mathews RN  Outcome: Progressing  5/31/2025 1506 by Sally Noyola, RN  Outcome: Progressing     Problem: Discharge Planning  Goal: Discharge to home or other facility with appropriate resources  5/31/2025 1506 by Sally Noyola, RN  Outcome: Progressing  Flowsheets (Taken 5/31/2025 1118)  Discharge to home or other facility with appropriate resources: Identify barriers to discharge with patient and caregiver     
  Problem: Chronic Conditions and Co-morbidities  Goal: Patient's chronic conditions and co-morbidity symptoms are monitored and maintained or improved  6/1/2025 0931 by Sally Noyola RN  Outcome: Progressing     Problem: Discharge Planning  Goal: Discharge to home or other facility with appropriate resources  Outcome: Progressing     Problem: Safety - Adult  Goal: Free from fall injury  6/1/2025 0931 by Sally Noyola RN  Outcome: Progressing     Problem: ABCDS Injury Assessment  Goal: Absence of physical injury  Outcome: Progressing     Problem: Skin/Tissue Integrity  Goal: Skin integrity remains intact  Description: 1.  Monitor for areas of redness and/or skin breakdown2.  Assess vascular access sites hourly3.  Every 4-6 hours minimum:  Change oxygen saturation probe site4.  Every 4-6 hours:  If on nasal continuous positive airway pressure, respiratory therapy assess nares and determine need for appliance change or resting period  Outcome: Progressing     Problem: Pain  Goal: Verbalizes/displays adequate comfort level or baseline comfort level  Outcome: Progressing     Problem: Neurosensory - Adult  Goal: Achieves stable or improved neurological status  Outcome: Progressing     Problem: Respiratory - Adult  Goal: Achieves optimal ventilation and oxygenation  Outcome: Progressing     Problem: Cardiovascular - Adult  Goal: Maintains optimal cardiac output and hemodynamic stability  Outcome: Progressing     Problem: Skin/Tissue Integrity - Adult  Goal: Skin integrity remains intact  Description: 1.  Monitor for areas of redness and/or skin breakdown2.  Assess vascular access sites hourly3.  Every 4-6 hours minimum:  Change oxygen saturation probe site4.  Every 4-6 hours:  If on nasal continuous positive airway pressure, respiratory therapy assess nares and determine need for appliance change or resting period  Outcome: Progressing     Problem: Musculoskeletal - Adult  Goal: Return mobility to safest level of 
  Problem: Chronic Conditions and Co-morbidities  Goal: Patient's chronic conditions and co-morbidity symptoms are monitored and maintained or improved  6/2/2025 0938 by Sally Noyola RN  Outcome: Progressing     Problem: Discharge Planning  Goal: Discharge to home or other facility with appropriate resources  Outcome: Progressing     Problem: Safety - Adult  Goal: Free from fall injury  6/2/2025 0938 by Sally Noyola RN  Outcome: Progressing     Problem: ABCDS Injury Assessment  Goal: Absence of physical injury  Outcome: Progressing     Problem: Skin/Tissue Integrity  Goal: Skin integrity remains intact  Description: 1.  Monitor for areas of redness and/or skin breakdown2.  Assess vascular access sites hourly3.  Every 4-6 hours minimum:  Change oxygen saturation probe site4.  Every 4-6 hours:  If on nasal continuous positive airway pressure, respiratory therapy assess nares and determine need for appliance change or resting period  Outcome: Progressing     Problem: Pain  Goal: Verbalizes/displays adequate comfort level or baseline comfort level  Outcome: Progressing     Problem: Neurosensory - Adult  Goal: Achieves stable or improved neurological status  Outcome: Progressing     Problem: Cardiovascular - Adult  Goal: Maintains optimal cardiac output and hemodynamic stability  Outcome: Progressing     Problem: Skin/Tissue Integrity - Adult  Goal: Skin integrity remains intact  Description: 1.  Monitor for areas of redness and/or skin breakdown2.  Assess vascular access sites hourly3.  Every 4-6 hours minimum:  Change oxygen saturation probe site4.  Every 4-6 hours:  If on nasal continuous positive airway pressure, respiratory therapy assess nares and determine need for appliance change or resting period  Outcome: Progressing     Problem: Musculoskeletal - Adult  Goal: Return mobility to safest level of function  Outcome: Progressing     Problem: Gastrointestinal - Adult  Goal: Minimal or absence of nausea and 
  Problem: Chronic Conditions and Co-morbidities  Goal: Patient's chronic conditions and co-morbidity symptoms are monitored and maintained or improved  6/3/2025 0923 by Sally Noyola RN  Outcome: Progressing     Problem: Discharge Planning  Goal: Discharge to home or other facility with appropriate resources  6/3/2025 0923 by Sally Noyola RN  Outcome: Progressing     Problem: Safety - Adult  Goal: Free from fall injury  6/3/2025 0923 by Sally Noyola RN  Outcome: Progressing     Problem: ABCDS Injury Assessment  Goal: Absence of physical injury  6/3/2025 0923 by Sally Noyola RN  Outcome: Progressing     Problem: Skin/Tissue Integrity  Goal: Skin integrity remains intact  Description: 1.  Monitor for areas of redness and/or skin breakdown2.  Assess vascular access sites hourly3.  Every 4-6 hours minimum:  Change oxygen saturation probe site4.  Every 4-6 hours:  If on nasal continuous positive airway pressure, respiratory therapy assess nares and determine need for appliance change or resting period  6/3/2025 0923 by Sally Noyola RN  Outcome: Progressing     Problem: Pain  Goal: Verbalizes/displays adequate comfort level or baseline comfort level  6/3/2025 0923 by Sally Noyola RN  Outcome: Progressing     Problem: Neurosensory - Adult  Goal: Achieves stable or improved neurological status  6/3/2025 0923 by Sally Noyola RN  Outcome: Progressing     Problem: Neurosensory - Adult  Goal: Absence of seizures  6/3/2025 0923 by Sally Noyola RN  Outcome: Progressing     Problem: Neurosensory - Adult  Goal: Remains free of injury related to seizures activity  6/3/2025 0923 by Sally Noyola RN  Outcome: Progressing     Problem: Neurosensory - Adult  Goal: Achieves maximal functionality and self care  6/3/2025 0923 by Sally Noyola RN  Outcome: Progressing     Problem: Respiratory - Adult  Goal: Achieves optimal ventilation and oxygenation  6/3/2025 0923 by Sally Noyola RN  Outcome: Progressing     Problem: 
  Problem: Chronic Conditions and Co-morbidities  Goal: Patient's chronic conditions and co-morbidity symptoms are monitored and maintained or improved  6/3/2025 2041 by Danial Brito RN  Outcome: Progressing  6/3/2025 1228 by Sally Noyola RN  Outcome: Progressing  6/3/2025 0923 by Sally Noyola RN  Outcome: Progressing     Problem: Discharge Planning  Goal: Discharge to home or other facility with appropriate resources  6/3/2025 2041 by Danial Brito RN  Outcome: Progressing  6/3/2025 1228 by Sally Noyola RN  Outcome: Progressing  6/3/2025 0923 by Sally Noyola RN  Outcome: Progressing     Problem: Safety - Adult  Goal: Free from fall injury  6/3/2025 2041 by Danial Brito RN  Outcome: Progressing  6/3/2025 1228 by Sally Noyola RN  Outcome: Progressing  6/3/2025 0923 by Sally Noyola RN  Outcome: Progressing     Problem: ABCDS Injury Assessment  Goal: Absence of physical injury  6/3/2025 2041 by Danial Brito RN  Outcome: Progressing  6/3/2025 1228 by Sally Noyola RN  Outcome: Progressing  6/3/2025 0923 by Sally Noyola RN  Outcome: Progressing     Problem: Skin/Tissue Integrity  Goal: Skin integrity remains intact  Description: 1.  Monitor for areas of redness and/or skin breakdown2.  Assess vascular access sites hourly3.  Every 4-6 hours minimum:  Change oxygen saturation probe site4.  Every 4-6 hours:  If on nasal continuous positive airway pressure, respiratory therapy assess nares and determine need for appliance change or resting period  6/3/2025 2041 by Danial Brito RN  Outcome: Progressing  6/3/2025 1228 by Sally Noyola RN  Outcome: Progressing  6/3/2025 0923 by Sally Noyola RN  Outcome: Progressing     Problem: Pain  Goal: Verbalizes/displays adequate comfort level or baseline comfort level  6/3/2025 2041 by Danial Brito RN  Outcome: Progressing  6/3/2025 1228 by Sally Noyola RN  Outcome: Progressing  6/3/2025 0923 by Sally Noyola RN  Outcome: 
  Problem: Chronic Conditions and Co-morbidities  Goal: Patient's chronic conditions and co-morbidity symptoms are monitored and maintained or improved  6/4/2025 0918 by Sally Noyola RN  Outcome: Progressing     Problem: Discharge Planning  Goal: Discharge to home or other facility with appropriate resources  6/4/2025 0918 by Sally Noyola RN  Outcome: Progressing     Problem: Safety - Adult  Goal: Free from fall injury  6/4/2025 0918 by Sally Noyola RN  Outcome: Progressing     Problem: ABCDS Injury Assessment  Goal: Absence of physical injury  6/4/2025 0918 by Sally Noyola RN  Outcome: Progressing     Problem: Skin/Tissue Integrity  Goal: Skin integrity remains intact  Description: 1.  Monitor for areas of redness and/or skin breakdown2.  Assess vascular access sites hourly3.  Every 4-6 hours minimum:  Change oxygen saturation probe site4.  Every 4-6 hours:  If on nasal continuous positive airway pressure, respiratory therapy assess nares and determine need for appliance change or resting period  6/4/2025 0918 by Sally Noyola RN  Outcome: Progressing     Problem: Pain  Goal: Verbalizes/displays adequate comfort level or baseline comfort level  6/4/2025 0918 by Sally Noyola RN  Outcome: Progressing     Problem: Neurosensory - Adult  Goal: Achieves stable or improved neurological status  6/4/2025 0918 by Sally Noyola RN  Outcome: Progressing     Problem: Neurosensory - Adult  Goal: Absence of seizures  6/4/2025 0918 by Sally Noyola RN  Outcome: Progressing     Problem: Neurosensory - Adult  Goal: Remains free of injury related to seizures activity  6/4/2025 0918 by Sally Noyola RN  Outcome: Progressing     Problem: Neurosensory - Adult  Goal: Achieves maximal functionality and self care  6/4/2025 0918 by Sally Noyola RN  Outcome: Progressing     Problem: Respiratory - Adult  Goal: Achieves optimal ventilation and oxygenation  6/4/2025 0918 by Sally Noyola RN  Outcome: Progressing     Problem: 
  Problem: Chronic Conditions and Co-morbidities  Goal: Patient's chronic conditions and co-morbidity symptoms are monitored and maintained or improved  6/4/2025 2045 by Vida Mathews RN  Outcome: Progressing  6/4/2025 0918 by Sally Noyola RN  Outcome: Progressing     Problem: Discharge Planning  Goal: Discharge to home or other facility with appropriate resources  6/4/2025 2045 by Vida Mathews RN  Outcome: Progressing  6/4/2025 0918 by Sally Noyola, RN  Outcome: Progressing     
  Problem: Chronic Conditions and Co-morbidities  Goal: Patient's chronic conditions and co-morbidity symptoms are monitored and maintained or improved  6/5/2025 1031 by Stephanie Avery RN  Outcome: Progressing  Flowsheets (Taken 6/5/2025 0853)  Care Plan - Patient's Chronic Conditions and Co-Morbidity Symptoms are Monitored and Maintained or Improved:   Monitor and assess patient's chronic conditions and comorbid symptoms for stability, deterioration, or improvement   Collaborate with multidisciplinary team to address chronic and comorbid conditions and prevent exacerbation or deterioration   Update acute care plan with appropriate goals if chronic or comorbid symptoms are exacerbated and prevent overall improvement and discharge  6/4/2025 2045 by Vida Mathews RN  Outcome: Progressing     Problem: Discharge Planning  Goal: Discharge to home or other facility with appropriate resources  6/5/2025 1031 by Stephanie Avery RN  Outcome: Progressing  Flowsheets (Taken 6/5/2025 0853)  Discharge to home or other facility with appropriate resources:   Identify barriers to discharge with patient and caregiver   Arrange for needed discharge resources and transportation as appropriate   Identify discharge learning needs (meds, wound care, etc)   Refer to discharge planning if patient needs post-hospital services based on physician order or complex needs related to functional status, cognitive ability or social support system  6/4/2025 2045 by Vida Mathews RN  Outcome: Progressing  Flowsheets (Taken 6/4/2025 2000)  Discharge to home or other facility with appropriate resources:   Identify barriers to discharge with patient and caregiver   Identify discharge learning needs (meds, wound care, etc)     Problem: Safety - Adult  Goal: Free from fall injury  6/5/2025 1031 by Stephanie Avery, RN  Outcome: Progressing  Flowsheets (Taken 5/27/2025 2000 by Suzan Vu, RN)  Free From Fall Injury:   Instruct 
  Problem: Chronic Conditions and Co-morbidities  Goal: Patient's chronic conditions and co-morbidity symptoms are monitored and maintained or improved  6/5/2025 2025 by Danial Brito RN  Outcome: Progressing  6/5/2025 1031 by Stephanie Avery RN  Outcome: Progressing  Flowsheets (Taken 6/5/2025 0853)  Care Plan - Patient's Chronic Conditions and Co-Morbidity Symptoms are Monitored and Maintained or Improved:   Monitor and assess patient's chronic conditions and comorbid symptoms for stability, deterioration, or improvement   Collaborate with multidisciplinary team to address chronic and comorbid conditions and prevent exacerbation or deterioration   Update acute care plan with appropriate goals if chronic or comorbid symptoms are exacerbated and prevent overall improvement and discharge     Problem: Discharge Planning  Goal: Discharge to home or other facility with appropriate resources  6/5/2025 2025 by Danial Brito RN  Outcome: Progressing  6/5/2025 1031 by Stephanie Avery RN  Outcome: Progressing  Flowsheets (Taken 6/5/2025 0853)  Discharge to home or other facility with appropriate resources:   Identify barriers to discharge with patient and caregiver   Arrange for needed discharge resources and transportation as appropriate   Identify discharge learning needs (meds, wound care, etc)   Refer to discharge planning if patient needs post-hospital services based on physician order or complex needs related to functional status, cognitive ability or social support system     Problem: Safety - Adult  Goal: Free from fall injury  6/5/2025 2025 by Danial Brito RN  Outcome: Progressing  6/5/2025 1031 by Stephanie Avery RN  Outcome: Progressing  Flowsheets (Taken 5/27/2025 2000 by Suzan Vu RN)  Free From Fall Injury:   Instruct family/caregiver on patient safety   Based on caregiver fall risk screen, instruct family/caregiver to ask for assistance with transferring infant if caregiver 
  Problem: Chronic Conditions and Co-morbidities  Goal: Patient's chronic conditions and co-morbidity symptoms are monitored and maintained or improved  Outcome: Progressing     Problem: Discharge Planning  Goal: Discharge to home or other facility with appropriate resources  Outcome: Progressing     Problem: Safety - Adult  Goal: Free from fall injury  Outcome: Progressing     Problem: ABCDS Injury Assessment  Goal: Absence of physical injury  Outcome: Progressing     Problem: Skin/Tissue Integrity  Goal: Skin integrity remains intact  Description: 1.  Monitor for areas of redness and/or skin breakdown2.  Assess vascular access sites hourly3.  Every 4-6 hours minimum:  Change oxygen saturation probe site4.  Every 4-6 hours:  If on nasal continuous positive airway pressure, respiratory therapy assess nares and determine need for appliance change or resting period  Outcome: Progressing     
  Problem: Chronic Conditions and Co-morbidities  Goal: Patient's chronic conditions and co-morbidity symptoms are monitored and maintained or improved  Outcome: Progressing     Problem: Discharge Planning  Goal: Discharge to home or other facility with appropriate resources  Outcome: Progressing     Problem: Safety - Adult  Goal: Free from fall injury  Outcome: Progressing     Problem: ABCDS Injury Assessment  Goal: Absence of physical injury  Outcome: Progressing     Problem: Skin/Tissue Integrity  Goal: Skin integrity remains intact  Description: 1.  Monitor for areas of redness and/or skin breakdown2.  Assess vascular access sites hourly3.  Every 4-6 hours minimum:  Change oxygen saturation probe site4.  Every 4-6 hours:  If on nasal continuous positive airway pressure, respiratory therapy assess nares and determine need for appliance change or resting period  Outcome: Progressing     Problem: Pain  Goal: Verbalizes/displays adequate comfort level or baseline comfort level  Outcome: Progressing     Problem: Neurosensory - Adult  Goal: Achieves stable or improved neurological status  Outcome: Progressing     Problem: Respiratory - Adult  Goal: Achieves optimal ventilation and oxygenation  Outcome: Progressing     Problem: Cardiovascular - Adult  Goal: Maintains optimal cardiac output and hemodynamic stability  Outcome: Progressing     Problem: Skin/Tissue Integrity - Adult  Goal: Skin integrity remains intact  Description: 1.  Monitor for areas of redness and/or skin breakdown2.  Assess vascular access sites hourly3.  Every 4-6 hours minimum:  Change oxygen saturation probe site4.  Every 4-6 hours:  If on nasal continuous positive airway pressure, respiratory therapy assess nares and determine need for appliance change or resting period  Outcome: Progressing     Problem: Musculoskeletal - Adult  Goal: Return mobility to safest level of function  Outcome: Progressing     Problem: Gastrointestinal - Adult  Goal: 
  Problem: Chronic Conditions and Co-morbidities  Goal: Patient's chronic conditions and co-morbidity symptoms are monitored and maintained or improved  Outcome: Progressing     Problem: Safety - Adult  Goal: Free from fall injury  Outcome: Progressing     Problem: Pain  Goal: Verbalizes/displays adequate comfort level or baseline comfort level  Outcome: Progressing     Problem: Respiratory - Adult  Goal: Achieves optimal ventilation and oxygenation  Outcome: Progressing     
  Problem: Chronic Conditions and Co-morbidities  Goal: Patient's chronic conditions and co-morbidity symptoms are monitored and maintained or improved  Outcome: Progressing  Flowsheets (Taken 5/28/2025 2041)  Care Plan - Patient's Chronic Conditions and Co-Morbidity Symptoms are Monitored and Maintained or Improved: Monitor and assess patient's chronic conditions and comorbid symptoms for stability, deterioration, or improvement     Problem: Discharge Planning  Goal: Discharge to home or other facility with appropriate resources  Outcome: Progressing  Flowsheets (Taken 5/28/2025 2041)  Discharge to home or other facility with appropriate resources: Identify barriers to discharge with patient and caregiver     Problem: Safety - Adult  Goal: Free from fall injury  Outcome: Progressing     Problem: ABCDS Injury Assessment  Goal: Absence of physical injury  Outcome: Progressing     Problem: Skin/Tissue Integrity  Goal: Skin integrity remains intact  Description: 1.  Monitor for areas of redness and/or skin breakdown2.  Assess vascular access sites hourly3.  Every 4-6 hours minimum:  Change oxygen saturation probe site4.  Every 4-6 hours:  If on nasal continuous positive airway pressure, respiratory therapy assess nares and determine need for appliance change or resting period  Outcome: Progressing  Flowsheets (Taken 5/28/2025 2041)  Skin Integrity Remains Intact: Monitor for areas of redness and/or skin breakdown     Problem: Pain  Goal: Verbalizes/displays adequate comfort level or baseline comfort level  Outcome: Progressing     Problem: Neurosensory - Adult  Goal: Achieves stable or improved neurological status  Outcome: Progressing  Flowsheets (Taken 5/28/2025 2041)  Achieves stable or improved neurological status: Assess for and report changes in neurological status  Goal: Absence of seizures  Outcome: Progressing  Flowsheets (Taken 5/28/2025 2041)  Absence of seizures: Monitor for seizure activity.  If seizure 
  Problem: Safety - Adult  Goal: Free from fall injury  6/1/2025 2050 by Vida Mathews RN  Outcome: Progressing  6/1/2025 0931 by Sally Noyola RN  Outcome: Progressing     Problem: Chronic Conditions and Co-morbidities  Goal: Patient's chronic conditions and co-morbidity symptoms are monitored and maintained or improved  6/1/2025 2050 by Vida Mathews RN  Outcome: Progressing  6/1/2025 0931 by Sally Noyola RN  Outcome: Progressing     
oxygenation  Outcome: Progressing  Flowsheets (Taken 5/29/2025 2036)  Achieves optimal ventilation and oxygenation: Assess for changes in respiratory status     Problem: Cardiovascular - Adult  Goal: Maintains optimal cardiac output and hemodynamic stability  Outcome: Progressing  Flowsheets (Taken 5/29/2025 2036)  Maintains optimal cardiac output and hemodynamic stability: Monitor blood pressure and heart rate  Goal: Absence of cardiac dysrhythmias or at baseline  Outcome: Progressing  Flowsheets (Taken 5/29/2025 2036)  Absence of cardiac dysrhythmias or at baseline: Monitor cardiac rate and rhythm     Problem: Skin/Tissue Integrity - Adult  Goal: Skin integrity remains intact  Description: 1.  Monitor for areas of redness and/or skin breakdown2.  Assess vascular access sites hourly3.  Every 4-6 hours minimum:  Change oxygen saturation probe site4.  Every 4-6 hours:  If on nasal continuous positive airway pressure, respiratory therapy assess nares and determine need for appliance change or resting period  Outcome: Progressing  Flowsheets (Taken 5/29/2025 2036)  Skin Integrity Remains Intact: Monitor for areas of redness and/or skin breakdown  Goal: Incisions, wounds, or drain sites healing without S/S of infection  Outcome: Progressing  Flowsheets (Taken 5/29/2025 2036)  Incisions, Wounds, or Drain Sites Healing Without Sign and Symptoms of Infection: ADMISSION and DAILY: Assess and document risk factors for pressure ulcer development  Goal: Oral mucous membranes remain intact  Outcome: Progressing  Flowsheets (Taken 5/29/2025 2036)  Oral Mucous Membranes Remain Intact: Assess oral mucosa and hygiene practices     Problem: Musculoskeletal - Adult  Goal: Return mobility to safest level of function  Outcome: Progressing  Flowsheets (Taken 5/29/2025 2036)  Return Mobility to Safest Level of Function: Assess patient stability and activity tolerance for standing, transferring and ambulating with or without assistive 
sites healing without S/S of infection  6/2/2025 2110 by Danial Brito RN  Outcome: Progressing  6/2/2025 0938 by Sally Noyola RN  Outcome: Progressing  Goal: Oral mucous membranes remain intact  6/2/2025 2110 by Danial Brito RN  Outcome: Progressing  6/2/2025 0938 by Sally Noyola RN  Outcome: Progressing     Problem: Musculoskeletal - Adult  Goal: Return mobility to safest level of function  6/2/2025 2110 by Danial Brito RN  Outcome: Progressing  6/2/2025 0938 by Sally Noyola RN  Outcome: Progressing  Goal: Maintain proper alignment of affected body part  6/2/2025 2110 by Danial Brito RN  Outcome: Progressing  6/2/2025 0938 by Sally Noyola RN  Outcome: Progressing  Goal: Return ADL status to a safe level of function  6/2/2025 2110 by Danial Brito RN  Outcome: Progressing  6/2/2025 0938 by Sally Noyola RN  Outcome: Progressing     Problem: Gastrointestinal - Adult  Goal: Minimal or absence of nausea and vomiting  6/2/2025 2110 by Danial Brito RN  Outcome: Progressing  6/2/2025 0938 by Sally Noyola RN  Outcome: Progressing  Goal: Maintains or returns to baseline bowel function  6/2/2025 2110 by Danial Brito RN  Outcome: Progressing  6/2/2025 0938 by Sally Noyola RN  Outcome: Progressing  Goal: Maintains adequate nutritional intake  6/2/2025 2110 by Danial Brito RN  Outcome: Progressing  6/2/2025 0938 by Sally Noyola RN  Outcome: Progressing  Goal: Establish and maintain optimal ostomy function  6/2/2025 2110 by Danial Brito RN  Outcome: Progressing  6/2/2025 0938 by Sally Noyola RN  Outcome: Progressing     Problem: Genitourinary - Adult  Goal: Absence of urinary retention  6/2/2025 2110 by Danial Brito RN  Outcome: Progressing  6/2/2025 0938 by Sally Noyola RN  Outcome: Progressing  Goal: Urinary catheter remains patent  6/2/2025 2110 by Danial Brito RN  Outcome: Progressing  6/2/2025 0938 by Sally Noyola RN  Outcome: 
Gastrointestinal - Adult  Goal: Minimal or absence of nausea and vomiting  Outcome: Progressing  Goal: Maintains or returns to baseline bowel function  Outcome: Progressing  Goal: Maintains adequate nutritional intake  Outcome: Progressing  Goal: Establish and maintain optimal ostomy function  Outcome: Progressing     Problem: Genitourinary - Adult  Goal: Absence of urinary retention  Outcome: Progressing  Goal: Urinary catheter remains patent  Outcome: Progressing     Problem: Infection - Adult  Goal: Absence of infection at discharge  Outcome: Progressing  Flowsheets (Taken 5/27/2025 2000 by Suzan Vu, RN)  Absence of infection at discharge:   Assess and monitor for signs and symptoms of infection   Monitor lab/diagnostic results   Monitor all insertion sites i.e., indwelling lines, tubes and drains   Administer medications as ordered  Goal: Absence of infection during hospitalization  Outcome: Progressing  Flowsheets (Taken 5/27/2025 2000 by Suzan Vu, RN)  Absence of infection during hospitalization:   Assess and monitor for signs and symptoms of infection   Monitor lab/diagnostic results   Monitor all insertion sites i.e., indwelling lines, tubes and drains   Sabin appropriate cooling/warming therapies per order   Administer medications as ordered  Goal: Absence of fever/infection during anticipated neutropenic period  Outcome: Progressing  Flowsheets (Taken 5/27/2025 2000 by Suzan Vu, RN)  Absence of fever/infection during anticipated neutropenic period: Monitor white blood cell count     Problem: Metabolic/Fluid and Electrolytes - Adult  Goal: Electrolytes maintained within normal limits  5/28/2025 0837 by Eddie Camacho, RN  Outcome: Progressing  5/28/2025 0018 by Suzan Vu, RN  Outcome: Progressing  Flowsheets (Taken 5/27/2025 2000)  Electrolytes maintained within normal limits:   Monitor labs and assess patient for signs and symptoms of electrolyte imbalances

## 2025-06-06 NOTE — TELEPHONE ENCOUNTER
Care Transitions Initial Follow Up Call    Outreach made within 2 business days of discharge: Yes    Patient: Michelle Nettles Patient : 1985   MRN: [id number]  Reason for Admission: Diabetic Ketocidosis  Discharge Date: 25       Spoke with: Patient    Discharge department/facility: [facility] [place]     TCM Interactive Patient Contact:  Was patient able to fill all prescriptions: Yes  Was patient instructed to bring all medications to the follow-up visit: Yes  Is patient taking all medications as directed in the discharge summary? Yes  Does patient understand their discharge instructions: Yes  Does patient have questions or concerns that need addressed prior to 7-14 day follow up office visit: yes -     Additional needs identified to be addressed with provider  No needs identified             Scheduled appointment with PCP within 7-14 days    Follow Up  Future Appointments   Date Time Provider Department Center   2025  3:30 PM SCHEDULE, [facility]   2025  3:30 PM Rogers Rodríguez MD [facility] ECC DEP   2025 12:40 PM Angelina Benitez, APRN - CNP [facility] ENDO [facility]       LUDIN HANDY MA

## 2025-06-06 NOTE — PROGRESS NOTES
Hospitalist Progress Note      Chief Complaint:  had concerns including Nausea (N/V started yesterday, blood sugar has been high \"because I havent eaten\" non compliant with everything, skipped dialysis sat, last treatment thursday).    Admission Date: 2025     SYNOPSIS:The patient is a 39 y.o. female with significant past medical history of chronic anemia, ESRD on hemodialysis, aVF, cellulitis, CHF, DM type I, hypertension presented to ED with complaints of abdominal pain, nausea, patient also skipped dialysis on Saturday due to weakness.Upon ED workup, found to have a hemoglobin of 6.7, glucose over 600, sodium of 130, BUN of 54, creatinine 11.2, lipase of 602, troponin of 243, proBNP of 23,330, beta-hydroxybutyrate greater than 4.5, hemoglobin A1c 7.5, RVP/COVID-19 PCR negative. CTA pulmonary with contrast no pulmonary emboli, pulmonary edema. CT abdomen pelvis with IV contrast showed a acute pancreatitis with peripancreatic fat stranding and inflammatory changes. No gastric fluid collection. Correlate with amylase and lipase value. Cholelithiasis without CT evidence of acute cholecystitis. Severe chronic atrophy of the bilateral kidneys suggestive chronic renal failure. No hydronephrosis.   Admitted to MICU for DKA.   1 unit PRBC transfused on .     SUBJECTIVE:    Patient seen and examined at bedside, not in acute distress, abdominal pain improving  Records reviewed.   Stable overnight. No overnight issues reported    Temp (24hrs), Av.4 °F (36.9 °C), Min:97.6 °F (36.4 °C), Max:99.2 °F (37.3 °C)    DIET: ADULT DIET; Regular; 3 carb choices (45 gm/meal); Low Sodium (2 gm)  CODE: Full Code    Intake/Output Summary (Last 24 hours) at 2025 1136  Last data filed at 2025 0633  Gross per 24 hour   Intake 4188.18 ml   Output 2800 ml   Net 1388.18 ml       OBJECTIVE:    /65   Pulse (!) 118   Temp 98.2 °F (36.8 °C) (Temporal)   Resp 20   Ht 1.499 m (4' 11\")   Wt 69.9 kg (154 lb 1.6 
       [facility]             Pulmonary & Critical Care Medicine                MICU Progress Note                 Written by: Brian Mosqueda MD  Name: Michelle Nettles : 1985       Age: 39 y.o. MR/Act #    : [id number],  Billing  #    : [id number]   Admit Date: 2025  7:29 AM LOS: 2,   Hospital Day: 3 Room #      : 4407/4407-A   PCP            : Rogers Rodríguez MD,   Referred by: Blas Enriquez MD ICU Attending: MD Pati Adams date: 2025 12:28 PM   ICU Days:       3 Vent Days:     0 LOS: 2,                          Reason for ICU admission           Chief Complaint   Patient presents with    Nausea     N/V started yesterday, blood sugar has been high \"because I havent eaten\" non compliant with everything, skipped dialysis sat, last treatment thursday                          Brief HPI, Presentation & Synopsis                       39 y.o. female with significant past medical history of chronic anemia, ESRD on hemodialysis, aVF, cellulitis, CHF, DM type I, hypertension, recent admission on 2025 through 2025 found to have low hemoglobin, received PRBC, found to have a acute traumatic nondisplaced obliquely oriented intra articular fracture involving the medial femoral condyle status post left medial femoral condyle open reduction internal fixation on , discharged home now presented to ED with nausea.  Patient stated her sugar has been high because she has not eaten anything.  Patient also complaining of skipping dialysis on Saturday given her weakness.  Patient denies of fever, chills, palpitation, chest pain, lightheadedness.  Patient was medicated for vomiting, which has improved.  Upon ED workup, found to have a hemoglobin of 6.7, glucose over 600, sodium of 130, BUN of 54, creatinine 11.2, lipase of 602, troponin of 243, proBNP of 23,330, beta-hydroxybutyrate greater than 4.5, hemoglobin A1c 7.5, RVP/COVID-19 PCR negative.  CTA 
       [facility] Hospitalist Progress Note    Admitting Date and Time: 5/26/2025  7:29 AM  Admit Dx: Shortness of breath [R06.02]  ESRD (end stage renal disease) (HCC) [N18.6]  DKA, type 2, not at goal (HCC) [E11.10]  Diabetic ketoacidosis without coma associated with type 2 diabetes mellitus (HCC) [E11.10]  Acute on chronic anemia [D64.9]    Subjective:  Patient is being followed for Shortness of breath [R06.02]  ESRD (end stage renal disease) (HCC) [N18.6]  DKA, type 2, not at goal (HCC) [E11.10]  Diabetic ketoacidosis without coma associated with type 2 diabetes mellitus (HCC) [E11.10]  Acute on chronic anemia [D64.9]   Pt feels  okay but having some pain.    ROS: denies fever, chills, cp, sob, n/v, HA unless stated above.      metoprolol tartrate  12.5 mg Oral BID    aspirin  81 mg Oral BID    docusate sodium  100 mg Oral Daily    ferrous sulfate  325 mg Oral Every Other Day    gabapentin  100 mg Oral BID    pantoprazole  40 mg Oral QAM AC    midodrine  2.5 mg Oral BID WC    insulin lispro  0-6 Units SubCUTAneous 4x Daily AC & HS    insulin glargine  5 Units SubCUTAneous Daily    insulin lispro  2 Units SubCUTAneous TID WC    sodium chloride flush  5-40 mL IntraVENous 2 times per day     oxyCODONE-acetaminophen, 1 tablet, Q6H PRN  traMADol, 50 mg, Q12H PRN  sulfur hexafluoride microspheres, 2 mL, ONCE PRN  bisacodyl, 10 mg, Daily PRN  glucose, 4 tablet, PRN  dextrose bolus, 125 mL, PRN   Or  dextrose bolus, 250 mL, PRN  glucagon (rDNA), 1 mg, PRN  dextrose, , Continuous PRN  sodium chloride, 1 spray, PRN  sodium chloride, , PRN  sodium chloride flush, 5-40 mL, PRN  sodium chloride, , PRN  ondansetron, 4 mg, Q8H PRN   Or  ondansetron, 4 mg, Q6H PRN  polyethylene glycol, 17 g, Daily PRN  acetaminophen, 650 mg, Q6H PRN   Or  acetaminophen, 650 mg, Q6H PRN  hydrALAZINE, 10 mg, Q6H PRN  diphenhydrAMINE, 25 mg, Q6H PRN         Objective:    BP (!) 110/58   Pulse (!) 102   Temp 98.1 °F (36.7 °C) (Oral)   Resp 18  
       [facility] Hospitalist Progress Note    Admitting Date and Time: 5/26/2025  7:29 AM  Admit Dx: Shortness of breath [R06.02]  ESRD (end stage renal disease) (HCC) [N18.6]  DKA, type 2, not at goal (HCC) [E11.10]  Diabetic ketoacidosis without coma associated with type 2 diabetes mellitus (HCC) [E11.10]  Acute on chronic anemia [D64.9]    Subjective:  Patient is being followed for Shortness of breath [R06.02]  ESRD (end stage renal disease) (HCC) [N18.6]  DKA, type 2, not at goal (HCC) [E11.10]  Diabetic ketoacidosis without coma associated with type 2 diabetes mellitus (HCC) [E11.10]  Acute on chronic anemia [D64.9]   Pt feels  okay but having some pain.    ROS: denies fever, chills, cp, sob, n/v, HA unless stated above.      metoprolol tartrate  12.5 mg Oral BID    aspirin  81 mg Oral BID    docusate sodium  100 mg Oral Daily    ferrous sulfate  325 mg Oral Every Other Day    gabapentin  100 mg Oral BID    pantoprazole  40 mg Oral QAM AC    midodrine  2.5 mg Oral BID WC    insulin lispro  0-6 Units SubCUTAneous 4x Daily AC & HS    insulin glargine  5 Units SubCUTAneous Daily    insulin lispro  2 Units SubCUTAneous TID WC    sodium chloride flush  5-40 mL IntraVENous 2 times per day     traMADol, 50 mg, Q6H PRN  oxyCODONE-acetaminophen, 1 tablet, Q6H PRN  sulfur hexafluoride microspheres, 2 mL, ONCE PRN  bisacodyl, 10 mg, Daily PRN  glucose, 4 tablet, PRN  dextrose bolus, 125 mL, PRN   Or  dextrose bolus, 250 mL, PRN  glucagon (rDNA), 1 mg, PRN  dextrose, , Continuous PRN  sodium chloride, 1 spray, PRN  sodium chloride, , PRN  sodium chloride flush, 5-40 mL, PRN  sodium chloride, , PRN  ondansetron, 4 mg, Q8H PRN   Or  ondansetron, 4 mg, Q6H PRN  polyethylene glycol, 17 g, Daily PRN  acetaminophen, 650 mg, Q6H PRN   Or  acetaminophen, 650 mg, Q6H PRN  hydrALAZINE, 10 mg, Q6H PRN  diphenhydrAMINE, 25 mg, Q6H PRN         Objective:    BP 97/80   Pulse (!) 106   Temp 97.7 °F (36.5 °C) (Temporal)   Resp 16   
       [facility] Hospitalist Progress Note    Admitting Date and Time: 5/26/2025  7:29 AM  Admit Dx: Shortness of breath [R06.02]  ESRD (end stage renal disease) (HCC) [N18.6]  DKA, type 2, not at goal (HCC) [E11.10]  Diabetic ketoacidosis without coma associated with type 2 diabetes mellitus (HCC) [E11.10]  Acute on chronic anemia [D64.9]    Synopsis: Michelle Nettles is a 39 y.o. female with significant past medical history of chronic anemia, ESRD on hemodialysis, aVF, cellulitis, CHF, DM type I, hypertension presented to ED with complaints of abdominal pain, nausea, patient also skipped dialysis on Saturday due to weakness.Upon ED workup, found to have a hemoglobin of 6.7, glucose over 600, sodium of 130, BUN of 54, creatinine 11.2, lipase of 602, troponin of 243, proBNP of 23,330, beta-hydroxybutyrate greater than 4.5, hemoglobin A1c 7.5, RVP/COVID-19 PCR negative. CTA pulmonary with contrast no pulmonary emboli, pulmonary edema. CT abdomen pelvis with IV contrast showed a acute pancreatitis with peripancreatic fat stranding and inflammatory changes. No gastric fluid collection. Correlate with amylase and lipase value. Cholelithiasis without CT evidence of acute cholecystitis. Severe chronic atrophy of the bilateral kidneys suggestive chronic renal failure. No hydronephrosis.   Admitted to MICU for DKA. Transferred out 5/28  1 unit PRBC transfused on 5/26.  Patient pancreatitis has resolved, patient had episode of NSVT for which EP is consulted.  Patient continues to have episodes of hypoglycemia with blood sugars elevated to 400s/500s.    Subjective:  Patient is being followed for Shortness of breath [R06.02]  ESRD (end stage renal disease) (HCC) [N18.6]  DKA, type 2, not at goal (HCC) [E11.10]  Diabetic ketoacidosis without coma associated with type 2 diabetes mellitus (HCC) [E11.10]  Acute on chronic anemia [D64.9]     Having some pain this morning   Blood sugar in the 300s this morning   130 after 
       [facility] Hospitalist Progress Note    Admitting Date and Time: 5/26/2025  7:29 AM  Admit Dx: Shortness of breath [R06.02]  ESRD (end stage renal disease) (HCC) [N18.6]  DKA, type 2, not at goal (HCC) [E11.10]  Diabetic ketoacidosis without coma associated with type 2 diabetes mellitus (HCC) [E11.10]  Acute on chronic anemia [D64.9]    Synopsis: Michelle Nettles is a 39 y.o. female with significant past medical history of chronic anemia, ESRD on hemodialysis, aVF, cellulitis, CHF, DM type I, hypertension presented to ED with complaints of abdominal pain, nausea, patient also skipped dialysis on Saturday due to weakness.Upon ED workup, found to have a hemoglobin of 6.7, glucose over 600, sodium of 130, BUN of 54, creatinine 11.2, lipase of 602, troponin of 243, proBNP of 23,330, beta-hydroxybutyrate greater than 4.5, hemoglobin A1c 7.5, RVP/COVID-19 PCR negative. CTA pulmonary with contrast no pulmonary emboli, pulmonary edema. CT abdomen pelvis with IV contrast showed a acute pancreatitis with peripancreatic fat stranding and inflammatory changes. No gastric fluid collection. Correlate with amylase and lipase value. Cholelithiasis without CT evidence of acute cholecystitis. Severe chronic atrophy of the bilateral kidneys suggestive chronic renal failure. No hydronephrosis.   Admitted to MICU for DKA. Transferred out 5/28  1 unit PRBC transfused on 5/26.  Patient pancreatitis has resolved, patient had episode of NSVT for which EP is consulted.  Patient continues to have episodes of hypoglycemia with blood sugars elevated to 400s/500s.    Subjective:  Patient is being followed for Shortness of breath [R06.02]  ESRD (end stage renal disease) (HCC) [N18.6]  DKA, type 2, not at goal (HCC) [E11.10]  Diabetic ketoacidosis without coma associated with type 2 diabetes mellitus (HCC) [E11.10]  Acute on chronic anemia [D64.9]     Having some pain this morning   Blood sugar in the 500s this morning     ROS: 
      HOSPITALIST PROGRESS NOTE    Patient's name: Michelle Nettles  : 1985  Admission date: 2025  Date of service: 2025   Primary care physician: Rogers Rodríguez MD    Assessment   Patient admitted on 2025     Michelle Nettles is a 39 y.o. female with significant past medical history of chronic anemia, ESRD on hemodialysis, aVF, cellulitis, CHF, DM type I, hypertension presented to ED with complaints of abdominal pain, nausea, patient also skipped dialysis on Saturday due to weakness.Upon ED workup, found to have a hemoglobin of 6.7, glucose over 600, sodium of 130, BUN of 54, creatinine 11.2, lipase of 602, troponin of 243, proBNP of 23,330, beta-hydroxybutyrate greater than 4.5, hemoglobin A1c 7.5, RVP/COVID-19 PCR negative. CTA pulmonary with contrast no pulmonary emboli, pulmonary edema. CT abdomen pelvis with IV contrast showed a acute pancreatitis with peripancreatic fat stranding and inflammatory changes. No gastric fluid collection. Correlate with amylase and lipase value. Cholelithiasis without CT evidence of acute cholecystitis. Severe chronic atrophy of the bilateral kidneys suggestive chronic renal failure. No hydronephrosis.   Admitted to MICU for DKA.   1 unit PRBC transfused on .  Patient pancreatitis has resolved, patient had episode of NSVT for which EP is consulted.  Patient continues to have episodes of hypoglycemia with blood sugars elevated to 400s/500s.    Acute pancreatitis  Patient was on IV fluids, IV fluids DC'd  Pain improved, tolerating diet    DKA  Type 1 diabetes mellitus with recurrent hyperglycemia  DKA resolved  Currently on Lantus and lispro  Endocrinology on board  Hyperglycemia with blood glucose in 500s on , .    NSVT  Started on low-dose metoprolol  Cardiology consult-defer to EP.  EP was consulted.    ESRD on hemodialysis  Nephrology following.    Anemia of chronic disease  Transfuse for hemoglobin less than 7    S/p 
  P Quality Flow/Interdisciplinary Rounds Progress Note        Quality Flow Rounds held on May 28, 2025    Disciplines Attending:  Nursing Unit Leadership    Michelle ORDONEZ Lencho Nettles was admitted on 5/26/2025  7:29 AM    Anticipated Discharge Date:       Disposition:       Edgar Score:  Edgar Scale Score: 20    [facility] RISK OF UNPLANNED READMISSION 2.0             46.6 Total Score        Discussed patient goal for the day, patient clinical progression, and barriers to discharge.  The following Goal(s) of the Day/Commitment(s) have been identified: maintain comfort with left knee pain monitor labs      Eddie Camacho RN  May 28, 2025       
4 Eyes Skin Assessment     NAME:  Michelle Nettles  YOB: 1985  MEDICAL RECORD NUMBER:  [id number]    The patient is being assessed for  Transfer to New Unit    I agree that at least one RN has performed a thorough Head to Toe Skin Assessment on the patient. ALL assessment sites listed below have been assessed.      Areas assessed by both nurses:    Head, Face, Ears, Shoulders, Back, Chest, Arms, Elbows, Hands, Sacrum. Buttock, Coccyx, Ischium, Legs. Feet and Heels, and Under Medical Devices         Does the Patient have a Wound? No noted wound(s)       Edgar Prevention initiated by RN: Yes  Wound Care Orders initiated by RN: No    Pressure Injury (Stage 3,4, Unstageable, DTI, NWPT, and Complex wounds) if present, place Wound referral order by RN under : No    New Ostomies, if present place, Ostomy referral order under : No     Nurse 1 eSignature: Electronically signed by Raven Butt RN on 5/28/25 at 10:50 PM EDT    **SHARE this note so that the co-signing nurse can place an eSignature**    Nurse 2 eSignature: Electronically signed by Clotilde Hillman RN on 5/28/25 at 10:50 PM EDT   
4 Eyes Skin Assessment     NAME:  Michelle Nettles  YOB: 1985  MEDICAL RECORD NUMBER:  [id number]    The patient is being assessed for  Admission    I agree that at least one RN has performed a thorough Head to Toe Skin Assessment on the patient. ALL assessment sites listed below have been assessed.      Areas assessed by both nurses:    Head, Face, Ears, Shoulders, Back, Chest, Arms, Elbows, Hands, Sacrum. Buttock, Coccyx, Ischium, Legs. Feet and Heels, and Under Medical Devices         Does the Patient have a Wound? No noted wound(s)       Edgar Prevention initiated by RN: No  Wound Care Orders initiated by RN: No    Pressure Injury (Stage 3,4, Unstageable, DTI, NWPT, and Complex wounds) if present, place Wound referral order by RN under : No    New Ostomies, if present place, Ostomy referral order under : No     Nurse 1 eSignature: Electronically signed by Kingston Wheeler RN on 5/26/25 at 6:49 PM EDT    **SHARE this note so that the co-signing nurse can place an eSignature**    Nurse 2 eSignature: Electronically signed by Heydi Park RN on 5/26/25 at 7:11 PM EDT  
Blood sugar 415 this AM. 6 units from sliding scale and 2 units straight of humalog given. Dr. Dyer notified via [employer]. Patient to dialysis.  Electronically signed by Raven Butt RN on 5/30/2025 at 6:50 AM    
Central monitors sent strip from [id number] for burst of afib vs vtach. There's 9 total spots on strips ranging from 5-8 beats of afib vs vtach. Patient is currently sinus tachy. Strips placed in soft chart. Sivan Reece NP notified of this via [employer].  Electronically signed by Raven Butt RN on 5/29/2025 at 6:16 AM    Glucose monitor reads high, order placed for stat lab draw.  Sivan Trevizo notified via [employer]. Will administer 6 units from sliding scale and 2 units straight.  Electronically signed by Raven Butt RN on 5/29/2025 at 6:43 AM    
Comprehensive Nutrition Assessment    Type and Reason for Visit:  LOS, Initial    Nutrition Recommendations/Plan:   Recommend continued diet as tolerated.  Recommend ONS; Renal ONS (Nepro) BID; Nazario BID-- monitor Ca/K) to promote PO intake/nutrient needs.      Malnutrition Assessment:  Malnutrition Status:  At risk for malnutrition (06/02/25 1214)    Context:  Chronic Illness     Findings of the 6 clinical characteristics of malnutrition:  Energy Intake:  Mild decrease in energy intake  Weight Loss:  Unable to assess (UTO; d/t wt flux on file likely 2/2 fluid shifts)     Body Fat Loss:  No body fat loss     Muscle Mass Loss:  No muscle mass loss    Fluid Accumulation:  No fluid accumulation     Strength:  Not Performed    Nutrition Assessment:    Admit w/ abd pain, N/V and hyperglycemia x ~2d pta, missed dialysis d/t illness; unable to keep any PO intake down. Admit w/ DKA/brittle DM and acute pancreatitis. PMHx chronic anemia/GIB, ESRD/HD, CHF, DM, HTN, DKA, CRF. Recent adm's for Lt ankle frx, s/p Ex Fix (2/15), s/p Ex Fix removal/ORIF (3/7), s/p femoral condyle ORIF (fx 2/2 syncope, 5/16). Blood sugars elavated to 400-500s; noted pt is tolerating diabetic diet. Pt does not meet malnutrition criteria at this time, however is at risk d/t decreased appetite w/ increased needs for post-op wound healing/known HD losses. Recommend continued diet and start ONS to aid in PO intake/nutrient needs; monitor during admission.    Nutrition Related Findings:    A&Ox4, -I/O(6.2 L), poor dentition, tender/non-distended abd, BS- active, +1 edema, hyponatremia, elevated BUN/Cr/BGL/Alk Phos, +HD, K/Phos WNL Wound Type: Surgical Incision       Current Nutrition Intake & Therapies:    Average Meal Intake: 51-75% (UTO d/t sporadic PO intakes per EMR)  Average Supplements Intake: None Ordered  ADULT DIET; Regular; 5 carb choices (75 gm/meal)  ADULT ORAL NUTRITION SUPPLEMENT; Breakfast, Dinner; Renal Oral Supplement  ADULT ORAL 
Consult completed and read  
Consult received for diabetes education. Chart reviewed. Pt off floor for procedure at this time. Will follow chart.     Electronically signed by Luz Marina Santiago RN on 5/27/2025 at 10:04 AM    
Department of Orthopedic Surgery  Resident Progress Note      Patient seen and examined. Pain controlled. No new complaints.  Denies chest pain, shortness of breath, dizziness/lightheadedness.    VITALS:  BP (!) 142/83   Pulse (!) 109   Temp 97.8 °F (36.6 °C)   Resp 16   Ht 1.499 m (4' 11\")   Wt 66.8 kg (147 lb 4.3 oz)   SpO2 100%   BMI 29.74 kg/m²     General: Alert and oriented x 3    MUSCULOSKELETAL:   left lower extremity:  Dressing clean dry and intact with hinged knee brace in place over the left lower extremity  Dressings are taken down at bedside to reveal 3 small surgical wounds with nylon sutures in place  Surgical wounds are well-healed and sutures removed at bedside's  Sensation intact distally to sural, saphenous, tibial, peroneal nerve distributions  Motor intact ankle dorsiflexion, plantarflexion, EHL  Distal extremities are warm and well-perfused  Capillary refill <3 seconds  Palpable DP and PT pulses    CBC:   Lab Results   Component Value Date/Time    WBC 6.7 05/28/2025 03:57 AM    HGB 7.9 05/28/2025 03:57 AM    HCT 23.7 05/28/2025 03:57 AM     05/28/2025 03:57 AM     PT/INR:    Lab Results   Component Value Date/Time    PROTIME 11.8 05/28/2025 03:57 AM    INR 1.1 05/28/2025 03:57 AM       ASSESSMENT  S/P left knee medial condyle ORIF 5/16    PLAN    Nonweightbearing left lower extremity  Keep hinged knee brace in place  Keep follow-up appointment in office if discharged from the hospital  Continue DVT prophylaxis per medical management team  Pain control per medicine team  Band-Aids placed over previous surgical scars, these can be removed in a few days  Can shower however do not soak left lower extremity in standing water  Work with PT/OT, nonweightbearing left lower extremity  Feel free to call or reach out with any new complaints or questions  All patient questions were sought and answered patient is currently agreeable to plan    Electronically signed by Andres Sparks DO on 
Electrophysiology consult sent via [employer].  Electronically signed by Raven Butt RN on 5/30/2025 at 6:51 AM    
Message sent to Dr. Enriquez for pt request for increased carbs for diet, message read, per Dr. Enriquez she can increase to 5 carbs per meal and sign refusal form   
Message sent via [employer] to Shraddha Avitia NP regarding patients blood sugar being 340. Message read, no new orders.  
Message sent via [employer] to provider on call Shraddha Avitia NP regarding blood sugar of 443. Orders state to notify provider. Message read at 7 am, no new orders.   
Morning labs show a blood glucose of 489, finger stick reading RR high. Message sent via [employer] to Sivan Reece NP regarding the matter.     STAT BMP ordered per protocol. Sliding scale increased calling for 8 units of humalog. The 8 units of humalog given AFTER BMP was drawn.   
Nephrology Progress Note  Patient's Name: Michelle Nettles  10:26 AM  5/30/2025         Reason for Consult: ESRD, HD dependent, volume overload  Requesting Physician:  Rogers Rodríguez MD    Chief Complaint: Shortness of breath , vomiting   History Obtained From:  patient and EHR    History of Present Ilness:    Michelle Nettles is a 39 y.o. female with a history of ESRD HD dependent, type 1 diabetes mellitus, chronic volume overload, hypertension and several other medical problems listed below.  She presents to ED with complaints of nausea and emesis over the course of the past 2 days.  Unable to keep any of her medications or food down.  This was associated with some mild abdominal discomfort.  She denies constipation or diarrhea.  Illness prevented her from going for her usual hemodialysis.  She presented to ED for further evaluation.  On presentation to ED vitals showed blood pressure 163/75 pulse 118 respirations 20, temperature 98.7.  Lab data show hemoglobin A1c 7.5%, lactic acid 2.5 proBNP 23 K+, beta hydroxy butyrate >4.5; sodium 126, potassium 4.4, chloride 78, CO2 11 anion gap 38 glucose 975, BUN 56, creatinine 11.2, WBC 8.3, hemoglobin 6.7 platelet count 284K; lipase 602  CT A/P with IV contrast showed signs of acute pancreatitis with peripancreatic fat stranding and inflammatory changes.  CT of the chest showed no pulmonary emboli but showed findings of pulmonary edema.  Patient was started on DKA protocol  She has been admitted to MICU  We are consulted for dialysis management; hemodialysis ordered.    Past Medical History:   Diagnosis Date    JOLLY (acute kidney injury) 04/05/2016    Anemia     AVF (arteriovenous fistula) 02/19/2018    let arm    Cellulitis, face     CHF (congestive heart failure) (HCC)     Chronic kidney disease     Chronic respiratory failure with hypoxia (HCC)     Dialysis AV fistula malfunction, initial encounter 11/07/2019    Displaced trimalleolar fracture of left 
Nephrology Progress Note  Patient's Name: Michelle Nettles  10:47 AM  5/29/2025         Reason for Consult: ESRD, HD dependent, volume overload  Requesting Physician:  Rogers Rodríguez MD    Chief Complaint: Shortness of breath , vomiting   History Obtained From:  patient and EHR    History of Present Ilness:    Michelle Nettles is a 39 y.o. female with a history of ESRD HD dependent, type 1 diabetes mellitus, chronic volume overload, hypertension and several other medical problems listed below.  She presents to ED with complaints of nausea and emesis over the course of the past 2 days.  Unable to keep any of her medications or food down.  This was associated with some mild abdominal discomfort.  She denies constipation or diarrhea.  Illness prevented her from going for her usual hemodialysis.  She presented to ED for further evaluation.  On presentation to ED vitals showed blood pressure 163/75 pulse 118 respirations 20, temperature 98.7.  Lab data show hemoglobin A1c 7.5%, lactic acid 2.5 proBNP 23 K+, beta hydroxy butyrate >4.5; sodium 126, potassium 4.4, chloride 78, CO2 11 anion gap 38 glucose 975, BUN 56, creatinine 11.2, WBC 8.3, hemoglobin 6.7 platelet count 284K; lipase 602  CT A/P with IV contrast showed signs of acute pancreatitis with peripancreatic fat stranding and inflammatory changes.  CT of the chest showed no pulmonary emboli but showed findings of pulmonary edema.  Patient was started on DKA protocol  She has been admitted to MICU  We are consulted for dialysis management; hemodialysis ordered.    Past Medical History:   Diagnosis Date    JOLLY (acute kidney injury) 04/05/2016    Anemia     AVF (arteriovenous fistula) 02/19/2018    let arm    Cellulitis, face     CHF (congestive heart failure) (HCC)     Chronic kidney disease     Chronic respiratory failure with hypoxia (HCC)     Dialysis AV fistula malfunction, initial encounter 11/07/2019    Displaced trimalleolar fracture of left 
Nephrology Progress Note  Patient's Name: Michelle Nettles  4:20 PM  6/3/2025         Reason for Consult: ESRD, HD dependent, volume overload  Requesting Physician:  Rogers Rodríguez MD    Chief Complaint: Shortness of breath , vomiting   History Obtained From:  patient and EHR    History of Present Ilness:    Michelle Nettles is a 39 y.o. female with a history of ESRD HD dependent, type 1 diabetes mellitus, chronic volume overload, hypertension and several other medical problems listed below.  She presents to ED with complaints of nausea and emesis over the course of the past 2 days.  Unable to keep any of her medications or food down.  This was associated with some mild abdominal discomfort.  She denies constipation or diarrhea.  Illness prevented her from going for her usual hemodialysis.  She presented to ED for further evaluation.  On presentation to ED vitals showed blood pressure 163/75 pulse 118 respirations 20, temperature 98.7.  Lab data show hemoglobin A1c 7.5%, lactic acid 2.5 proBNP 23 K+, beta hydroxy butyrate >4.5; sodium 126, potassium 4.4, chloride 78, CO2 11 anion gap 38 glucose 975, BUN 56, creatinine 11.2, WBC 8.3, hemoglobin 6.7 platelet count 284K; lipase 602  CT A/P with IV contrast showed signs of acute pancreatitis with peripancreatic fat stranding and inflammatory changes.  CT of the chest showed no pulmonary emboli but showed findings of pulmonary edema.  Patient was started on DKA protocol  She has been admitted to MICU  We are consulted for dialysis management; hemodialysis ordered.    Interval History:  6/3/25: Seen and examined. No new issues overnight. Tolerated HD this am well without issue.      Allergies:  Vancomycin    Current Medications:    mupirocin (BACTROBAN) 2 % ointment, BID  insulin glargine (LANTUS) injection vial 5 Units, Daily  white petrolatum ointment, PRN  insulin lispro (HUMALOG,ADMELOG) injection vial 0-8 Units, 4x Daily AC & HS  traMADol (ULTRAM) 
Nephrology Progress Note  Patient's Name: Michelle Nettles  4:54 PM  5/31/2025         Reason for Consult: ESRD, HD dependent, volume overload  Requesting Physician:  Rogers Rodríguez MD    Chief Complaint: Shortness of breath , vomiting   History Obtained From:  patient and EHR    History of Present Ilness:    Michelle Nettles is a 39 y.o. female with a history of ESRD HD dependent, type 1 diabetes mellitus, chronic volume overload, hypertension and several other medical problems listed below.  She presents to ED with complaints of nausea and emesis over the course of the past 2 days.  Unable to keep any of her medications or food down.  This was associated with some mild abdominal discomfort.  She denies constipation or diarrhea.  Illness prevented her from going for her usual hemodialysis.  She presented to ED for further evaluation.  On presentation to ED vitals showed blood pressure 163/75 pulse 118 respirations 20, temperature 98.7.  Lab data show hemoglobin A1c 7.5%, lactic acid 2.5 proBNP 23 K+, beta hydroxy butyrate >4.5; sodium 126, potassium 4.4, chloride 78, CO2 11 anion gap 38 glucose 975, BUN 56, creatinine 11.2, WBC 8.3, hemoglobin 6.7 platelet count 284K; lipase 602  CT A/P with IV contrast showed signs of acute pancreatitis with peripancreatic fat stranding and inflammatory changes.  CT of the chest showed no pulmonary emboli but showed findings of pulmonary edema.  Patient was started on DKA protocol  She has been admitted to MICU  We are consulted for dialysis management; hemodialysis ordered.    Past Medical History:   Diagnosis Date    JOLLY (acute kidney injury) 04/05/2016    Anemia     AVF (arteriovenous fistula) 02/19/2018    let arm    Cellulitis, face     CHF (congestive heart failure) (HCC)     Chronic kidney disease     Chronic respiratory failure with hypoxia (HCC)     Dialysis AV fistula malfunction, initial encounter 11/07/2019    Displaced trimalleolar fracture of left 
Nephrology Progress Note  Patient's Name: Michelle Nettles  4:57 PM  6/1/2025         Reason for Consult: ESRD, HD dependent, volume overload  Requesting Physician:  Rogers Rodríguez MD    Chief Complaint: Shortness of breath , vomiting   History Obtained From:  patient and EHR    History of Present Ilness:    Michelle Nettles is a 39 y.o. female with a history of ESRD HD dependent, type 1 diabetes mellitus, chronic volume overload, hypertension and several other medical problems listed below.  She presents to ED with complaints of nausea and emesis over the course of the past 2 days.  Unable to keep any of her medications or food down.  This was associated with some mild abdominal discomfort.  She denies constipation or diarrhea.  Illness prevented her from going for her usual hemodialysis.  She presented to ED for further evaluation.  On presentation to ED vitals showed blood pressure 163/75 pulse 118 respirations 20, temperature 98.7.  Lab data show hemoglobin A1c 7.5%, lactic acid 2.5 proBNP 23 K+, beta hydroxy butyrate >4.5; sodium 126, potassium 4.4, chloride 78, CO2 11 anion gap 38 glucose 975, BUN 56, creatinine 11.2, WBC 8.3, hemoglobin 6.7 platelet count 284K; lipase 602  CT A/P with IV contrast showed signs of acute pancreatitis with peripancreatic fat stranding and inflammatory changes.  CT of the chest showed no pulmonary emboli but showed findings of pulmonary edema.  Patient was started on DKA protocol  She has been admitted to MICU  We are consulted for dialysis management; hemodialysis ordered.    Past Medical History:   Diagnosis Date    JOLLY (acute kidney injury) 04/05/2016    Anemia     AVF (arteriovenous fistula) 02/19/2018    let arm    Cellulitis, face     CHF (congestive heart failure) (HCC)     Chronic kidney disease     Chronic respiratory failure with hypoxia (HCC)     Dialysis AV fistula malfunction, initial encounter 11/07/2019    Displaced trimalleolar fracture of left 
Nephrology Progress Note  Patient's Name: Michelle Nettles  5:14 PM  6/4/2025         Reason for Consult: ESRD, HD dependent, volume overload  Requesting Physician:  Rogers Rodríguez MD    Chief Complaint: Shortness of breath , vomiting   History Obtained From:  patient and EHR    History of Present Ilness:    Michelle Nettles is a 39 y.o. female with a history of ESRD HD dependent, type 1 diabetes mellitus, chronic volume overload, hypertension and several other medical problems listed below.  She presents to ED with complaints of nausea and emesis over the course of the past 2 days.  Unable to keep any of her medications or food down.  This was associated with some mild abdominal discomfort.  She denies constipation or diarrhea.  Illness prevented her from going for her usual hemodialysis.  She presented to ED for further evaluation.  On presentation to ED vitals showed blood pressure 163/75 pulse 118 respirations 20, temperature 98.7.  Lab data show hemoglobin A1c 7.5%, lactic acid 2.5 proBNP 23 K+, beta hydroxy butyrate >4.5; sodium 126, potassium 4.4, chloride 78, CO2 11 anion gap 38 glucose 975, BUN 56, creatinine 11.2, WBC 8.3, hemoglobin 6.7 platelet count 284K; lipase 602  CT A/P with IV contrast showed signs of acute pancreatitis with peripancreatic fat stranding and inflammatory changes.  CT of the chest showed no pulmonary emboli but showed findings of pulmonary edema.  Patient was started on DKA protocol  She has been admitted to MICU  We are consulted for dialysis management; hemodialysis ordered.    Interval History:  6/4/25: Seen and examined. No new issues overnight.       Allergies:  Vancomycin    Current Medications:    insulin lispro (HUMALOG,ADMELOG) injection vial 3 Units, TID WC  insulin glargine (LANTUS) injection vial 5 Units, Nightly  mupirocin (BACTROBAN) 2 % ointment, BID  white petrolatum ointment, PRN  insulin lispro (HUMALOG,ADMELOG) injection vial 0-8 Units, 4x Daily 
Nephrology Progress Note  Patient's Name: Michelle Nettles  6:49 PM  6/5/2025         Reason for Consult: ESRD, HD dependent, volume overload  Requesting Physician:  Rogers Rodríguez MD    Chief Complaint: Shortness of breath , vomiting   History Obtained From:  patient and EHR    History of Present Ilness:    Michelle Nettles is a 39 y.o. female with a history of ESRD HD dependent, type 1 diabetes mellitus, chronic volume overload, hypertension and several other medical problems listed below.  She presents to ED with complaints of nausea and emesis over the course of the past 2 days.  Unable to keep any of her medications or food down.  This was associated with some mild abdominal discomfort.  She denies constipation or diarrhea.  Illness prevented her from going for her usual hemodialysis.  She presented to ED for further evaluation.  On presentation to ED vitals showed blood pressure 163/75 pulse 118 respirations 20, temperature 98.7.  Lab data show hemoglobin A1c 7.5%, lactic acid 2.5 proBNP 23 K+, beta hydroxy butyrate >4.5; sodium 126, potassium 4.4, chloride 78, CO2 11 anion gap 38 glucose 975, BUN 56, creatinine 11.2, WBC 8.3, hemoglobin 6.7 platelet count 284K; lipase 602  CT A/P with IV contrast showed signs of acute pancreatitis with peripancreatic fat stranding and inflammatory changes.  CT of the chest showed no pulmonary emboli but showed findings of pulmonary edema.  Patient was started on DKA protocol  She has been admitted to MICU  We are consulted for dialysis management; hemodialysis ordered.    Interval History:  6/5/25: Seen and examined. No new issues overnight. Tolerating dialysis well today        Allergies:  Vancomycin    Current Medications:    lidocaine 4 % external patch 1 patch, Daily  insulin glargine (LANTUS) injection vial 3 Units, Nightly  insulin lispro (HUMALOG,ADMELOG) injection vial 2 Units, TID WC  mupirocin (BACTROBAN) 2 % ointment, BID  white petrolatum 
Nephrology Progress Note  Patient's Name: Michelle Nettles  8:05 AM  5/28/2025         Reason for Consult: ESRD, HD dependent, volume overload  Requesting Physician:  Rogers Rodríguez MD    Chief Complaint: Shortness of breath , vomiting   History Obtained From:  patient and EHR    History of Present Ilness:    Michelle Nettles is a 39 y.o. female with a history of ESRD HD dependent, type 1 diabetes mellitus, chronic volume overload, hypertension and several other medical problems listed below.  She presents to ED with complaints of nausea and emesis over the course of the past 2 days.  Unable to keep any of her medications or food down.  This was associated with some mild abdominal discomfort.  She denies constipation or diarrhea.  Illness prevented her from going for her usual hemodialysis.  She presented to ED for further evaluation.  On presentation to ED vitals showed blood pressure 163/75 pulse 118 respirations 20, temperature 98.7.  Lab data show hemoglobin A1c 7.5%, lactic acid 2.5 proBNP 23 K+, beta hydroxy butyrate >4.5; sodium 126, potassium 4.4, chloride 78, CO2 11 anion gap 38 glucose 975, BUN 56, creatinine 11.2, WBC 8.3, hemoglobin 6.7 platelet count 284K; lipase 602  CT A/P with IV contrast showed signs of acute pancreatitis with peripancreatic fat stranding and inflammatory changes.  CT of the chest showed no pulmonary emboli but showed findings of pulmonary edema.  Patient was started on DKA protocol  She has been admitted to MICU  We are consulted for dialysis management; hemodialysis ordered.    Past Medical History:   Diagnosis Date    JOLLY (acute kidney injury) 04/05/2016    Anemia     AVF (arteriovenous fistula) 02/19/2018    let arm    Cellulitis, face     CHF (congestive heart failure) (HCC)     Chronic kidney disease     Chronic respiratory failure with hypoxia (HCC)     Dialysis AV fistula malfunction, initial encounter 11/07/2019    Displaced trimalleolar fracture of left 
Nephrology Progress Note  Patient's Name: Michelle Nettles  8:38 AM  5/27/2025         Reason for Consult: ESRD, HD dependent, volume overload  Requesting Physician:  Rogers Rodríguez MD    Chief Complaint: Shortness of breath , vomiting   History Obtained From:  patient and EHR    History of Present Ilness:    Michelle Nettles is a 39 y.o. female with a history of ESRD HD dependent, type 1 diabetes mellitus, chronic volume overload, hypertension and several other medical problems listed below.  She presents to ED with complaints of nausea and emesis over the course of the past 2 days.  Unable to keep any of her medications or food down.  This was associated with some mild abdominal discomfort.  She denies constipation or diarrhea.  Illness prevented her from going for her usual hemodialysis.  She presented to ED for further evaluation.  On presentation to ED vitals showed blood pressure 163/75 pulse 118 respirations 20, temperature 98.7.  Lab data show hemoglobin A1c 7.5%, lactic acid 2.5 proBNP 23 K+, beta hydroxy butyrate >4.5; sodium 126, potassium 4.4, chloride 78, CO2 11 anion gap 38 glucose 975, BUN 56, creatinine 11.2, WBC 8.3, hemoglobin 6.7 platelet count 284K; lipase 602  CT A/P with IV contrast showed signs of acute pancreatitis with peripancreatic fat stranding and inflammatory changes.  CT of the chest showed no pulmonary emboli but showed findings of pulmonary edema.  Patient was started on DKA protocol  She has been admitted to MICU  We are consulted for dialysis management; hemodialysis ordered.    Past Medical History:   Diagnosis Date    JOLLY (acute kidney injury) 04/05/2016    Anemia     AVF (arteriovenous fistula) 02/19/2018    let arm    Cellulitis, face     CHF (congestive heart failure) (HCC)     Chronic kidney disease     Chronic respiratory failure with hypoxia (HCC)     Dialysis AV fistula malfunction, initial encounter 11/07/2019    Displaced trimalleolar fracture of left 
Occupational Therapy  Attempted OT eval this AM, however, pt being transferred off unit for Dialysis ~ 1115.  Attempted OT assessment again this afternoon, however, pt remains off unit.  Will attempt OT assessment next date.  Thank you for this referral SILVIA Piedra, OTR/L  # [id number]    
Occupational Therapy  Attempted OT eval, however, pt off unit at dialysis.  Will attempt assessment at a later time.  Thank you for this referral. SILVIA Piedra, OTR/L  # [id number]    1615:  Attempted OT eval at b/s again this afternoon, however, pt declined to participate d/t experiencing nausea.  Reported transferring to [facility] w/ [facility] staff.  Denied having any needs currently or for d/c.  Will attempt assessment at a later time.  SILVIA Piedra, OTR/L  # [id number]      
Patient admitted to MICU with the following belongings: Purse, Cell Phone, Cell Phone , Pants, Shirt, Socks, Jacket, and Hat. And one shoe. The following belongings are with the patient at bedside  
Patient currently getting dialysis, bmp will be drawn after dialysis.  
Patient will need resulted pregnancy test or waiver placed in chart prior to exam being completed. Patient will also need a waiver placed in chart for labs prior to exam.   
Perfect serve message sent to Shraddha Avitia NP, regarding patient blood glucose.    Electronically signed by Danial Brito RN on 6/4/25 at 6:43 AM EDT    
Physical Therapy    PT consult to evaluate/treat received and appreciated.  Pt chart reviewed and evaluation attempted.  Pt is currently on hold for transfer and dialysis.  Will check back as able.  Thank you.        Koby Fontanez, PT, DPT   [id number]       
Physical Therapy    PT evaluation orders received and chart review completed. Pt off unit at time of attempt.     Will follow and re-attempt as appropriate. Thank you.    Sadia Dawson PT, DPT  [id number]      
Physical Therapy  Physical Therapy Initial Assessment     Name: Michelle Nettles  : 1985  MRN: [id number]      Date of Service: 2025    Evaluating PT:  Sadia Dawson PT, DPT [id number]    Room #:  4507/4507-B  Diagnosis:  Shortness of breath [R06.02]  ESRD (end stage renal disease) ([facility]) [N18.6]  DKA, type 2, not at goal ([facility]) [E11.10]  Diabetic ketoacidosis without coma associated with type 2 diabetes mellitus ([facility]) [E11.10]  Acute on chronic anemia [D64.9]  PMHx/PSHx:    Past Medical History:   Diagnosis Date    JOLLY (acute kidney injury) 2016    Anemia     AVF (arteriovenous fistula) 2018    let arm    Cellulitis, face     CHF (congestive heart failure) ([facility])     Chronic kidney disease     Chronic respiratory failure with hypoxia ([facility])     Dialysis AV fistula malfunction, initial encounter 2019    Displaced trimalleolar fracture of left lower leg, closed, initial encounter 2025    DM type 1 (diabetes mellitus, type 1) ([facility])     Encounter regarding vascular access for dialysis for ESRD ([facility]) 2018    ESRD (end stage renal disease) ([facility])     Hemodialysis patient     amrita dawson  / graft in left arm    Hidradenitis suppurativa     Hypercalcemia     Hyperkalemia     Hypertension     Iron deficiency anemia secondary to blood loss (chronic)     Other acute gastritis without hemorrhage     Tobacco abuse 2016    Type 2 diabetes mellitus with hyperglycemia ([facility])     Vitamin B-complex deficiency       Procedure/Surgery:  none this admission  Precautions:  Falls, NWB LLE - hinged knee brace locked 0-30 degrees, O2  Equipment Needs:  TBD, pt has WW and WC    SUBJECTIVE:    Pt lives alone in a 1 story home with ramped entry. Bed is on 1st floor and bath is on 1st floor.  Pt ambulated with WW vs WC PTA.    OBJECTIVE:   Initial Evaluation  Date: 25 Treatment Short Term/ Long Term   Goals   AM-PAC 6 Clicks      Was pt agreeable to Eval/treatment? yes 
Physical Therapy  Physical Therapy Treatment      Name: Michelle Nettles  : 1985  MRN: [id number]      Date of Service: 2025    Evaluating PT:  Sadia Dawson PT, DPT [id number]    Room #:  4507/4507-B  Diagnosis:  Shortness of breath [R06.02]  ESRD (end stage renal disease) ([facility]) [N18.6]  DKA, type 2, not at goal ([facility]) [E11.10]  Diabetic ketoacidosis without coma associated with type 2 diabetes mellitus ([facility]) [E11.10]  Acute on chronic anemia [D64.9]  PMHx/PSHx:    Past Medical History:   Diagnosis Date    JOLLY (acute kidney injury) 2016    Anemia     AVF (arteriovenous fistula) 2018    let arm    Cellulitis, face     CHF (congestive heart failure) ([facility])     Chronic kidney disease     Chronic respiratory failure with hypoxia ([facility])     Dialysis AV fistula malfunction, initial encounter 2019    Displaced trimalleolar fracture of left lower leg, closed, initial encounter 2025    DM type 1 (diabetes mellitus, type 1) ([facility])     Encounter regarding vascular access for dialysis for ESRD ([facility]) 2018    ESRD (end stage renal disease) ([facility])     Hemodialysis patient     amrita dawson  / graft in left arm    Hidradenitis suppurativa     Hypercalcemia     Hyperkalemia     Hypertension     Iron deficiency anemia secondary to blood loss (chronic)     Other acute gastritis without hemorrhage     Tobacco abuse 2016    Type 2 diabetes mellitus with hyperglycemia ([facility])     Vitamin B-complex deficiency       Procedure/Surgery:  none this admission  Precautions:  Falls, NWB LLE - hinged knee brace locked 0-30 degrees, O2  Equipment Needs:  TBD, pt has WW and WC    SUBJECTIVE:    Pt lives alone in a 1 story home with ramped entry. Bed is on 1st floor and bath is on 1st floor.  Pt ambulated with WW vs WC PTA.    OBJECTIVE:   Initial Evaluation  Date: 25 Treatment  25 Short Term/ Long Term   Goals   AM-PAC 6 Clicks     Was pt agreeable to 
Reason for consult:  DKA  A1C:7.5%         Patient states the following concerns/barriers to diabetes self-management:     [] None       [] Medication cost   [] Food cost/availability        [] Reading  [] Hearing   [] Vision                [] Work    [] Transportation  [] No insurance  [x] Physical limitations (Recent surgery)   [] Other:    Patient states the following about their diabetes/health:   [x]   Has had diabetes since she was 5 years old.  [x]   Attended outpatient education \"quite a while ago\"  [x]   Monitors blood glucose 2x a day.   [x]   Eats 1 meal (dinner) and snacks throughout day.  [x]   Drinks water and diet pop. Has small cans of regular pop for hypoglycemia episodes.  [x]   Was active but recently broke leg and required surgery.    Diabetes survival packet provided to:   [x] Patient     [] Other:    Information given:   Definition of diabetes   Target glucose ranges/A1C   Self-monitoring of blood glucose   Prevention/symptoms/treatment of hypo-/hyperglycemia   Medication adherence             The plate method/meal planning guidelines             The benefits of exercise and recommendations             Reducing the risk of chronic complications     Diabetes medications reviewed (use, purpose, action): Humalog and lantus            Post-education Assessment  [x]  Attentive to teaching  [x]  Answered questions appropriately when asked   [x]  Seems able to apply concepts to daily lifestyle  [x]  Seems motivated to do well  [x]  Verbalized an understanding of plate method  [x]  Verbalized an understanding of prescribed antidiabetes medications   [x]  Verbalized an understanding of target glucose ranges/A1C level  []  Expresses an intent to comply with treatment plan   []  Showed very little interest in complying with treatment plan   []  Seems to have trouble applying concepts to daily lifestyle     COMMENTS: Pt receptive to education. Interested in OP education in approx 6 weeks once she is able 
Renal Dose Adjustment Policy (Generic)     This patient is on medication that requires renal, weight, and/or indication dose adjustment.      Date Body Weight IBW  Adjusted BW SCr  CrCl Dialysis status   5/29/2025 66.8 kg (147 lb 4.3 oz) Ideal body weight: 48.8 kg (107 lb 8.4 oz)  Adjusted ideal body weight: 56 kg (123 lb 6.7 oz) Serum creatinine: 3.9 mg/dL (H) 05/28/25 0357  Estimated creatinine clearance: 17 mL/min (A) HD       Pharmacy has dose-adjusted the following medication(s):    Date Previous Order Adjusted Order   5/29/2025 Tramadol 50 mg q6H PRN Tramadol 50 mg q12H PRN (ESRD on HD)        These changes were made per protocol according to the [facility]   Automatic Renal Dose Adjustment Policy.     *Please note this dose may need readjusted if patient's condition changes.    Please contact pharmacy with any questions regarding these changes.    Peterson Tee, PharmD   5/29/2025  12:36 AM   
Spiritual Health History and Assessment/Progress Note  [facility] [place]    (P) Advance Care Planning, Spiritual/Emotional Needs,  ,  ,      Name: Michelle Nettles MRN: [id number]    Age: 39 y.o.     Sex: female   Language: English   Latter day: Scientologist   Type 1 diabetes mellitus with ketoacidosis without coma (HCC)     Date: 5/30/2025                           Spiritual [address]        Referral/Consult From: (P) Other (comment) ()   Encounter Overview/Reason: (P) Advance Care Planning, Spiritual/Emotional Needs  Service Provided For: (P) Patient    The  reached out to the patient's siblings to obtain addresses for the updated POA documentation. The  will continue to follow the patient as needed. If additional support is needed please reach out to Spiritual Health (ext [phone]).    Rev ELISE Bassett MDIV,        Jillian, Belief, Meaning:   Patient identifies as spiritual, is connected with a jillian tradition or spiritual practice, and has beliefs or practices that help with coping during difficult times  Family/Friends No family/friends present      Importance and Influence:  Patient has spiritual/personal beliefs that influence decisions regarding their health  Family/Friends No family/friends present    Community:  Patient is connected with a spiritual community and feels well-supported. Support system includes: Children, [place] Community, and Extended family  Family/Friends No family/friends present    Assessment and Plan of Care:     Patient Interventions include: Facilitated expression of thoughts and feelings, Explored spiritual coping/struggle/distress, Engaged in theological reflection, Affirmed coping skills/support systems, and Facilitated life review and/ or legacy  Family/Friends Interventions include: No family/friends present    Patient Plan of Care: Other: The  will continue to follow as needed.  Family/Friends Plan of Care: 
The  went to complete POA Documentation. The patient is currently not available she is receiving her dialysis treatment at this current time. The  will follow up to complete documentation.  
While stripping the patient's room after discharge, patient noted to have left healthcare power of  paperwork on side table. Call placed to patient. Patient said they will have a family member come pick it up in the morning.    Electronically signed by Danial Brito RN on 6/5/25 at 9:36 PM EDT    
(rDNA), 1 mg, PRN  dextrose, , Continuous PRN  sodium chloride, 1 spray, PRN  sodium chloride, , PRN  sodium chloride flush, 5-40 mL, PRN  sodium chloride, , PRN  ondansetron, 4 mg, Q8H PRN   Or  ondansetron, 4 mg, Q6H PRN  polyethylene glycol, 17 g, Daily PRN  acetaminophen, 650 mg, Q6H PRN   Or  acetaminophen, 650 mg, Q6H PRN  hydrALAZINE, 10 mg, Q6H PRN  diphenhydrAMINE, 25 mg, Q6H PRN      Continuous Infusions:   dextrose      sodium chloride      sodium chloride         Review of Systems  All systems reviewed. All negative except for symptoms mentioned in HPI     OBJECTIVE    BP (!) 111/48   Pulse (!) 119   Temp 98.9 °F (37.2 °C) (Oral)   Resp 16   Ht 1.499 m (4' 11\")   Wt 66.8 kg (147 lb 4.3 oz)   SpO2 99%   BMI 29.74 kg/m²     Intake/Output Summary (Last 24 hours) at 5/28/2025 2106  Last data filed at 5/28/2025 1749  Gross per 24 hour   Intake 800 ml   Output 4000 ml   Net -3200 ml       Physical examination:  General: awake alert, oriented x3  HEENT: normocephalic non traumatic, no exophthalmos   Neck: supple, No thyroid tenderness,  Pulm: good equal air entry no added sounds  CVS: S1 + S2  Abd: soft lax, no tenderness  Skin: warm, no lesions, no rash. No open wounds, no ulcers   Neuro: CN intact, sensation decreased bilateral , muscle power normal  Psych: normal mood, and affect    Review of Laboratory Data:  I personally reviewed the following labs:   Recent Labs     05/26/25  0745 05/26/25  1436 05/27/25  0616 05/28/25  0357   WBC 8.3  --  6.2 6.7   RBC 2.29*  --  2.56* 2.69*   HGB 6.7* 7.7* 7.7* 7.9*   HCT 21.8* 22.0* 22.2* 23.7*   MCV 95.2  --  86.7 88.1   MCH 29.3  --  30.1 29.4   MCHC 30.7*  --  34.7* 33.3   RDW 16.0*  --  15.0 15.7*     --  219 190   MPV 9.6  --  8.8 8.8     Recent Labs     05/26/25  0745 05/26/25  0936 05/27/25  0616 05/27/25  0617 05/27/25  1318 05/28/25  0357   *   < > 134* 133* 134* 132   K 4.4   < > 3.9 3.8 3.8 4.1   CL 78*   < > 97* 96* 96* 96*   CO2 11*   < 
denies fever, chills, cp, sob, n/v, HA unless stated above.      insulin lispro  0-8 Units SubCUTAneous 4x Daily AC & HS    insulin glargine  10 Units SubCUTAneous Daily    metoprolol tartrate  12.5 mg Oral BID    aspirin  81 mg Oral BID    docusate sodium  100 mg Oral Daily    ferrous sulfate  325 mg Oral Every Other Day    gabapentin  100 mg Oral BID    pantoprazole  40 mg Oral QAM AC    midodrine  2.5 mg Oral BID WC    insulin lispro  2 Units SubCUTAneous TID WC    sodium chloride flush  5-40 mL IntraVENous 2 times per day     white petrolatum, , PRN  traMADol, 50 mg, Q6H PRN  oxyCODONE-acetaminophen, 1 tablet, Q6H PRN  sulfur hexafluoride microspheres, 2 mL, ONCE PRN  bisacodyl, 10 mg, Daily PRN  glucose, 4 tablet, PRN  dextrose bolus, 125 mL, PRN   Or  dextrose bolus, 250 mL, PRN  glucagon (rDNA), 1 mg, PRN  dextrose, , Continuous PRN  sodium chloride, 1 spray, PRN  sodium chloride, , PRN  sodium chloride flush, 5-40 mL, PRN  sodium chloride, , PRN  ondansetron, 4 mg, Q8H PRN   Or  ondansetron, 4 mg, Q6H PRN  polyethylene glycol, 17 g, Daily PRN  acetaminophen, 650 mg, Q6H PRN   Or  acetaminophen, 650 mg, Q6H PRN  hydrALAZINE, 10 mg, Q6H PRN  diphenhydrAMINE, 25 mg, Q6H PRN         Objective:    BP (!) 150/70   Pulse 93   Temp 98.1 °F (36.7 °C) (Oral)   Resp 18   Ht 1.499 m (4' 11.02\")   Wt 62.4 kg (137 lb 9.1 oz)   SpO2 98%   BMI 27.77 kg/m²     General Appearance: alert and oriented to person, place and time and in no acute distress  Skin: warm and dry  Head: normocephalic and atraumatic  Eyes: pupils equal, round, and reactive to light, extraocular eye movements intact, conjunctivae normal  Neck: neck supple and non tender without mass   Pulmonary/Chest: clear to auscultation bilaterally- no wheezes, rales or rhonchi, normal air movement, no respiratory distress  Cardiovascular: normal rate, normal S1 and S2 and no carotid bruits  Abdomen: soft, non-tender, non-distended, normal bowel sounds, no 
was seen and examined at bedside.  Patient is complaining of pain in her left knee blood glucose elevated this morning.      Review of Systems  All systems reviewed and negative except mentioned above.       Objective  Most Recent Recorded Vitals  BP (!) 126/52   Pulse (!) 111   Temp 98.2 °F (36.8 °C) (Oral)   Resp 18   Ht 1.499 m (4' 11\")   Wt 66.8 kg (147 lb 4.3 oz)   SpO2 99%   BMI 29.74 kg/m²     General appearance: awake, alert No apparent distress, appears stated age and cooperative.  HEENT: Normal cephalic, atraumatic without obvious deformity. Pupils equal, round, and reactive to light.  Extra ocular muscles intact. Conjunctivae/corneas clear.  Neck: Supple, with full range of motion. No jugular venous distention. Trachea midline.  Respiratory:  lungs clear to auscultation; without wheezes, rales or rhonchi; on mask  Cardiovascular:  regular rate and rhythm; normal S1, S2; no murmurs, rubs, clicks or gallops; peripheral edema PRESENT/ABSENT: Absent   Abdomen:  soft, non-tender, non-distended  Musculoskeletal: No clubbing, cyanosis, edema of bilateral lower extremities. Brisk capillary refill.   Skin: Normal skin color.  No rashes or lesions.  Neurologic: Oriented to person, place, and time, normal speech, no obvious focal deficits  Psychiatric: Thought content appropriate, normal insight    Hospital Medications  Current Facility-Administered Medications   Medication Dose Route Frequency Provider Last Rate Last Admin    traMADol (ULTRAM) tablet 50 mg  50 mg Oral Q12H PRN Sivan Reece APRN - CNP   50 mg at 05/29/25 0129    metoprolol tartrate (LOPRESSOR) tablet 12.5 mg  12.5 mg Oral BID Blas Erniquez MD        aspirin EC tablet 81 mg  81 mg Oral BID Blas Enriquez MD   81 mg at 05/29/25 0932    bisacodyl (DULCOLAX) suppository 10 mg  10 mg Rectal Daily PRN Blas Enriquez MD        docusate sodium (COLACE) capsule 100 mg  100 mg Oral Daily Blas Enriquez MD   100 mg at 05/28/25 1242    ferrous 
10% 125 mL, PRN   Or  dextrose bolus 10% 250 mL, PRN  glucagon injection 1 mg, PRN  dextrose 10 % infusion, Continuous PRN  insulin lispro (HUMALOG,ADMELOG) injection vial 2 Units, TID WC  sodium chloride (OCEAN, BABY AYR) 0.65 % nasal spray 1 spray, PRN  0.9 % sodium chloride infusion, PRN  sodium chloride flush 0.9 % injection 5-40 mL, 2 times per day  sodium chloride flush 0.9 % injection 5-40 mL, PRN  0.9 % sodium chloride infusion, PRN  ondansetron (ZOFRAN-ODT) disintegrating tablet 4 mg, Q8H PRN   Or  ondansetron (ZOFRAN) injection 4 mg, Q6H PRN  polyethylene glycol (GLYCOLAX) packet 17 g, Daily PRN  acetaminophen (TYLENOL) tablet 650 mg, Q6H PRN   Or  acetaminophen (TYLENOL) suppository 650 mg, Q6H PRN  hydrALAZINE (APRESOLINE) injection 10 mg, Q6H PRN  diphenhydrAMINE (BENADRYL) injection 25 mg, Q6H PRN        Review of Systems:   Pertinent items are noted in HPI.    Physical exam:  Vitals:    06/02/25 1508   BP: 120/73   Pulse: 92   Resp: 18   Temp: 98.3 °F (36.8 °C)   SpO2: 100%           General: No distress. Alert.   Eyes: PERRL. No sclera icterus. No conjunctival injection.;  Facial puffiness and periorbital edema bilaterally  ENT: No discharge. Pharynx clear.  Neck: Trachea midline. Normal thyroid.  Lungs: Coarse breath sounds bilaterally  CV: Regular rate. Regular rhythm. No murmur or rub. .   Abd: Edematous abdominal wall diminished bowel sounds.   Skin: Warm and dry. No nodule on exposed extremities. No rash on exposed extremities.  Ext: No cyanosis, clubbing, 3+ pitting bilateral leg edema; LUE AVF with good thrill and bruit  Neuro: Awake. Follows commands. Positive pupils/gag/corneals. Normal pain response.      Data:   Labs:  Lab Results   Component Value Date     (L) 06/02/2025     06/02/2025     (L) 06/01/2025    K 5.1 06/02/2025    K 5.2 (H) 06/02/2025    K 4.4 06/01/2025    CL 93 (L) 06/02/2025    CO2 24 06/02/2025    CO2 23 06/02/2025    CO2 26 06/01/2025    CREATININE 6.0 
Daily AC & HS Brennan Dyer MD   2 Units at 05/28/25 0550    insulin lispro (HUMALOG,ADMELOG) injection vial 2 Units  2 Units SubCUTAneous TID WC Brennan Dyer MD   2 Units at 05/28/25 0730    sodium chloride (OCEAN, BABY AYR) 0.65 % nasal spray 1 spray  1 spray Each Nostril PRN Brian Mosqueda MD   1 spray at 05/28/25 0140    0.9 % sodium chloride infusion   IntraVENous PRN Kalyan Muir MD        sodium chloride flush 0.9 % injection 5-40 mL  5-40 mL IntraVENous 2 times per day Della Nguyen MD   10 mL at 05/28/25 0730    sodium chloride flush 0.9 % injection 5-40 mL  5-40 mL IntraVENous PRN Della Nguyen MD        0.9 % sodium chloride infusion   IntraVENous PRN Della Nguyen MD        ondansetron (ZOFRAN-ODT) disintegrating tablet 4 mg  4 mg Oral Q8H PRN Della Nguyen MD        Or    ondansetron (ZOFRAN) injection 4 mg  4 mg IntraVENous Q6H PRN Della Nguyen MD   4 mg at 05/27/25 1721    polyethylene glycol (GLYCOLAX) packet 17 g  17 g Oral Daily PRN Della Nguyen MD        acetaminophen (TYLENOL) tablet 650 mg  650 mg Oral Q6H PRN Della Nguyen MD        Or    acetaminophen (TYLENOL) suppository 650 mg  650 mg Rectal Q6H PRN Della Nguyen MD        hydrALAZINE (APRESOLINE) injection 10 mg  10 mg IntraVENous Q6H PRN Della Nguyen MD   10 mg at 05/28/25 0305    diphenhydrAMINE (BENADRYL) injection 25 mg  25 mg IntraVENous Q6H PRN Della Nguyen MD   25 mg at 05/27/25 2013    HYDROmorphone (DILAUDID) injection 0.25 mg  0.25 mg IntraVENous Q4H PRN Della Nguyen MD   0.25 mg at 05/28/25 0734    pantoprazole (PROTONIX) 40 mg in sodium chloride (PF) 0.9 % 10 mL injection  40 mg IntraVENous Daily Brittney Coyle APRN - CNP   40 mg at 05/28/25 0729       PRN Medications  glucose, dextrose bolus **OR** dextrose bolus, glucagon (rDNA), dextrose, sodium chloride, sodium chloride, sodium chloride flush, sodium chloride, ondansetron **OR** ondansetron, polyethylene glycol, acetaminophen **OR** 
Good  Additional Comments:  Pt was pleasant and cooperative.      Vitals/Lab Values:  WFL Room air        Functional Assessment:  AM-PAC Daily Activity Raw Score: 16/24     Initial Eval Status  Date: 6-2-25   Treatment Status  Date: STGs = LTGs  Time frame: 10-14 days   Feeding IND after set up      NA   Grooming SBA/set up    Washing hands/face while standing at sink  VCs for safety    Mod I    Standing at the Sink   UB Dressing Set up      Mod I       LB Dressing Mod A/set up     Seated/standing  Pt ed for safe/adaptive techs, use of adaptive equip    Independent      Bathing Mod A/set up    Sponge -bathing simulated at b/s  Pt ed re: Benefits of use of Shower chair, resources for obtaining equip; for safe/adaptive techs    Supervision       Toileting Min A    SUP - Bowel Hygiene, Min A  Clothing adjustment standing  Pt ed for safe/adaptive techs    Mod I       Bed Mobility  Supine to sit: NT  Sit to supine:   NT    In chair    Supine to sit: Independent   Sit to supine: Independent      Functional Transfers Sit to stand: SBA   Stand to sit: SBA    Armed chair, Commode  Pt ed for safety/hand placement    Mod I       Functional Mobility SBA w/ WW     Short distances at b/s, bathroom  Pt ed for safety/improved safety awareness, walker safety  Good adherence to NWB status    Mod I       Balance Sitting:     Static:   Remote SUP    Dynamic:   SUP w/ functional ax      Standing:     Static:  SBA w/ WW    Dynamic:  SBA w/ functional ax/mobility w/ WW    Sitting:     Static:  Independent     Dynamic:  Independent w/ functional ax    Standing:     Static:  Mod I  w/ AD PRN    Dynamic:  Mod I  w/ functional ax/mobility w/ AD PRN   Activity Tolerance  Fair    Sitting Tolerance w/ light ax  extended time in chair  Standing Tolerance w/ light ax ~ 2 mins + 5 mins     Good(-)   Visual/  Perceptual      Hearing: WNL   Glasses: No      WFL   Hearing Aids:  No       Safety Fair +  Good during functional activity/mobility   
MD Della        acetaminophen (TYLENOL) tablet 650 mg  650 mg Oral Q6H PRN Della Nguyen MD        Or    acetaminophen (TYLENOL) suppository 650 mg  650 mg Rectal Q6H PRN Della Nguyen MD        hydrALAZINE (APRESOLINE) injection 10 mg  10 mg IntraVENous Q6H PRN Della Nguyen MD   10 mg at 05/27/25 0847    diphenhydrAMINE (BENADRYL) injection 25 mg  25 mg IntraVENous Q6H PRN Della Nguyen MD   25 mg at 05/26/25 1510    HYDROmorphone (DILAUDID) injection 0.25 mg  0.25 mg IntraVENous Q4H PRN Della Nguyen MD   0.25 mg at 05/27/25 0657    pantoprazole (PROTONIX) 40 mg in sodium chloride (PF) 0.9 % 10 mL injection  40 mg IntraVENous Daily Coyle, Brittney, APRN - CNP         PRN Meds      : glucose, dextrose bolus **OR** dextrose bolus, glucagon (rDNA), dextrose, sodium chloride, dextrose bolus **OR** dextrose bolus, potassium chloride, magnesium sulfate, sodium phosphate 15 mmol in sodium chloride 0.9 % 250 mL IVPB, dextrose 5 % and 0.45 % NaCl, sodium chloride flush, sodium chloride, ondansetron **OR** ondansetron, polyethylene glycol, acetaminophen **OR** acetaminophen, hydrALAZINE, diphenhydrAMINE, HYDROmorphone  Allergy            : Vancomycin  Immunization      :   Immunization History   Administered Date(s) Administered    COVID-19, MODERNA BLUE border, Primary or Immunocompromised, (age 12y+), IM, 100 mcg/0.5mL 03/17/2021, 03/17/2021, 04/14/2021, 04/14/2021, 10/21/2021, 10/21/2021    Hepatitis B 09/06/2016, 10/08/2016, 11/08/2016, 03/11/2017, 07/06/2017, 08/09/2017, 09/12/2017, 12/09/2017, 01/11/2019, 04/05/2021, 05/05/2021, 06/09/2021    Hepatitis B vaccine 09/06/2016, 10/08/2016, 11/08/2016, 03/11/2017, 07/06/2017, 08/09/2017, 09/12/2017, 12/09/2017, 01/11/2019, 04/05/2021, 05/05/2021    Influenza, FLUBLOK, (age 18 y+), Quadv PF, 0.5mL 11/07/2023    Pneumococcal, PCV-13, PREVNAR 13, (age 6w+), IM, 0.5mL 03/02/2018    Pneumococcal, PPSV23, PNEUMOVAX 23, (age 2y+), SC/IM, 0.5mL 04/27/2018, 05/23/2023    TDaP, 
developed, well nourished who appears of stated age. Awake, alert and cooperative. No apparent distress.   HEENT:   Head- Normocephalic, atraumatic   Eyes- Conjunctivae pink, anicteric  Throat- Oral mucosa pink and moist  Neck-  No stridor, trachea midline, no jugular venous distention. No carotid bruit.    CHEST: Chest symmetrical and non-tender to palpation. No accessory muscle use or intercostal retractions  RESPIRATORY: Lung sounds - clear throughout fields   CARDIOVASCULAR:     Heart Inspection- shows no noted pulsations  Heart Palpation- no heaves or thrills; PMI is non-displaced   Heart Ausculation- Regular rate and rhythm, no murmur. No s3, s4 or rub   PV: No lower extremity edema. No varicosities. Pedal pulses palpable, no clubbing or cyanosis with dialysis graft left upper forearm with good bruit and thrill  ABDOMEN: Soft, non-tender to light palpation. Bowel sounds present. No palpable masses no organomegaly; no abdominal bruit  MS: Good muscle strength and tone. No atrophy or abnormal movements.  And wearing a soft cast on her left lower extremity  : Deferred  SKIN: Warm and dry no statis dermatitis or ulcers   NEURO / PSYCH: Oriented to person, place and time. Speech clear and appropriate. Follows all commands. Pleasant affect     DATA:    ECG / Tele strips:  sinus rhythm  Diagnostic:      Intake/Output Summary (Last 24 hours) at 5/30/2025 1946  Last data filed at 5/30/2025 1046  Gross per 24 hour   Intake 480 ml   Output 1776 ml   Net -1296 ml       Labs:   CBC:   Recent Labs     05/28/25  0357 05/29/25  0430   WBC 6.7 4.1*   HGB 7.9* 8.5*   HCT 23.7* 26.1*    174     BMP:   Recent Labs     05/29/25  0430 05/30/25  0505   * 129*   K 4.6 4.8   CO2 25 21*   BUN 13 20   CREATININE 3.6* 3.3*   LABGLOM 16* 17*   CALCIUM 9.0 8.9     Mag:   Recent Labs     05/29/25  0430 05/30/25  0505   MG 2.0 1.9     Phos:   Recent Labs     05/29/25  0430 05/30/25  0505   PHOS 3.0 3.2     TFT:   Lab Results

## 2025-06-09 ENCOUNTER — CARE COORDINATION (OUTPATIENT)
Dept: CARE COORDINATION | Age: 40
End: 2025-06-09

## 2025-06-09 NOTE — CARE COORDINATION
Care Transitions Note    Initial Call - Call within 2 business days of discharge: Yes    Second and final attempt to reach patient for transitions of care follow up. Unable to reach patient.    Outreach Attempts:   Multiple attempts to contact patient at phone numbers on file.   HIPAA compliant voicemail left for patient.   Excluded from Care Transition calls until 25 (one month) due to being unable to reach after two consecutive admissions.    Patient: Michelle Nettles    Patient : 1985   MRN: [id number]    Reason for Admission: DKA  Discharge Date: 25  RURS: Readmission Risk Score: 42.4    Last Discharge Facility       Date Complaint Diagnosis Description Type Department Provider    25 Nausea Diabetic ketoacidosis without coma associated with type 2 diabetes mellitus (HCC) ... ED to Hosp-Admission (Discharged) (ADMITTED) [facility] Ana Fleming, Cassie Hernandez,...   Was this an external facility discharge? No    Follow Up Appointment:   Patient has hospital follow up appointment scheduled within 14 days of discharge.    Future Appointments         Provider Specialty Dept Phone    2025 3:30 PM SCHEDULE, [facility] Spiritual Services [phone]    2025 3:30 PM Rogers Rodríguez MD Primary Care [phone]    2025 10:00 AM SCHEDULE, [facility] Orthopedic Surgery [phone]    2025 12:40 PM Angelina Benitez, APRN - CNP Endocrinology [phone]            No further follow-up call indicated     Sarah Yin RN

## 2025-06-10 ENCOUNTER — TELEPHONE (OUTPATIENT)
Age: 40
End: 2025-06-10

## 2025-06-11 ENCOUNTER — TELEPHONE (OUTPATIENT)
Dept: VASCULAR SURGERY | Age: 40
End: 2025-06-11

## 2025-06-11 NOTE — TELEPHONE ENCOUNTER
Patient did not show today for her fistulogram because she had emesis.  Rescheduled to Wed, 6-18-25 at 1:30 pm. Report to admitting office at 12:30 pm.

## 2025-06-12 ENCOUNTER — CLINICAL DOCUMENTATION (OUTPATIENT)
Age: 40
End: 2025-06-12

## 2025-06-17 ENCOUNTER — APPOINTMENT (OUTPATIENT)
Dept: CT IMAGING | Age: 40
End: 2025-06-17
Payer: MEDICARE

## 2025-06-17 ENCOUNTER — TELEPHONE (OUTPATIENT)
Dept: PRIMARY CARE CLINIC | Age: 40
End: 2025-06-17

## 2025-06-17 ENCOUNTER — APPOINTMENT (OUTPATIENT)
Dept: GENERAL RADIOLOGY | Age: 40
End: 2025-06-17
Payer: MEDICARE

## 2025-06-17 ENCOUNTER — HOSPITAL ENCOUNTER (INPATIENT)
Age: 40
LOS: 4 days | Discharge: HOME | End: 2025-06-21
Attending: EMERGENCY MEDICINE | Admitting: INTERNAL MEDICINE
Payer: MEDICARE

## 2025-06-17 DIAGNOSIS — N18.9 CKD (CHRONIC KIDNEY DISEASE): ICD-10-CM

## 2025-06-17 DIAGNOSIS — Z87.19 HISTORY OF PANCREATITIS: ICD-10-CM

## 2025-06-17 DIAGNOSIS — R10.13 EPIGASTRIC PAIN: ICD-10-CM

## 2025-06-17 DIAGNOSIS — J96.11 CHRONIC HYPOXEMIC RESPIRATORY FAILURE (HCC): ICD-10-CM

## 2025-06-17 DIAGNOSIS — N18.6 END STAGE RENAL DISEASE (HCC): ICD-10-CM

## 2025-06-17 DIAGNOSIS — E11.10 DIABETIC KETOACIDOSIS WITHOUT COMA ASSOCIATED WITH TYPE 2 DIABETES MELLITUS (HCC): Primary | ICD-10-CM

## 2025-06-17 DIAGNOSIS — R26.2 UNABLE TO AMBULATE: ICD-10-CM

## 2025-06-17 DIAGNOSIS — Z99.2 ESRD ON DIALYSIS (HCC): ICD-10-CM

## 2025-06-17 DIAGNOSIS — E10.10 DIABETIC KETOACIDOSIS WITHOUT COMA ASSOCIATED WITH TYPE 1 DIABETES MELLITUS (HCC): ICD-10-CM

## 2025-06-17 DIAGNOSIS — N18.6 ESRD ON DIALYSIS (HCC): ICD-10-CM

## 2025-06-17 LAB
ALBUMIN SERPL-MCNC: 3.6 G/DL (ref 3.5–5.2)
ALP SERPL-CCNC: 153 U/L (ref 35–104)
ALT SERPL-CCNC: 5 U/L (ref 0–35)
ANION GAP SERPL CALCULATED.3IONS-SCNC: 30 MMOL/L (ref 7–16)
ANION GAP SERPL CALCULATED.3IONS-SCNC: 31 MMOL/L (ref 7–16)
ANION GAP SERPL CALCULATED.3IONS-SCNC: 34 MMOL/L (ref 7–16)
AST SERPL-CCNC: 11 U/L (ref 0–35)
B-OH-BUTYR SERPL-MCNC: >4.5 MMOL/L (ref 0.02–0.27)
BASOPHILS # BLD: 0.02 K/UL (ref 0–0.2)
BASOPHILS NFR BLD: 0 % (ref 0–2)
BILIRUB SERPL-MCNC: 0.3 MG/DL (ref 0–1.2)
BNP SERPL-MCNC: ABNORMAL PG/ML (ref 0–125)
BUN BLD-MCNC: 59 MG/DL (ref 6–20)
BUN SERPL-MCNC: 56 MG/DL (ref 6–20)
BUN SERPL-MCNC: 59 MG/DL (ref 6–20)
BUN SERPL-MCNC: 59 MG/DL (ref 6–20)
CALCIUM SERPL-MCNC: 8.9 MG/DL (ref 8.6–10)
CALCIUM SERPL-MCNC: 8.9 MG/DL (ref 8.6–10)
CALCIUM SERPL-MCNC: 9 MG/DL (ref 8.6–10)
CHLORIDE BLD-SCNC: 99 MMOL/L (ref 100–108)
CHLORIDE SERPL-SCNC: 86 MMOL/L (ref 98–107)
CHLORIDE SERPL-SCNC: 86 MMOL/L (ref 98–107)
CHLORIDE SERPL-SCNC: 88 MMOL/L (ref 98–107)
CO2 BLD CALC-SCNC: 13 MMOL/L (ref 22–29)
CO2 SERPL-SCNC: 10 MMOL/L (ref 22–29)
CO2 SERPL-SCNC: 13 MMOL/L (ref 22–29)
CO2 SERPL-SCNC: 14 MMOL/L (ref 22–29)
CREAT BLD-MCNC: 11.8 MG/DL (ref 0.5–1)
CREAT SERPL-MCNC: 11.2 MG/DL (ref 0.5–1)
CREAT SERPL-MCNC: 11.3 MG/DL (ref 0.5–1)
CREAT SERPL-MCNC: 11.4 MG/DL (ref 0.5–1)
CRITICAL ACTION: NORMAL
CRITICAL NOTIFICATION DATE/TIME: NORMAL
CRITICAL NOTIFICATION: NORMAL
CRP SERPL HS-MCNC: 25.9 MG/L (ref 0–5)
EGFR, POC: 4 ML/MIN/1.73M2
EKG ATRIAL RATE: 110 BPM
EKG P AXIS: 59 DEGREES
EKG P-R INTERVAL: 120 MS
EKG Q-T INTERVAL: 334 MS
EKG QRS DURATION: 74 MS
EKG QTC CALCULATION (BAZETT): 452 MS
EKG R AXIS: 13 DEGREES
EKG T AXIS: 104 DEGREES
EKG VENTRICULAR RATE: 110 BPM
EOSINOPHIL # BLD: 0.13 K/UL (ref 0.05–0.5)
EOSINOPHILS RELATIVE PERCENT: 2 % (ref 0–6)
ERYTHROCYTE [DISTWIDTH] IN BLOOD BY AUTOMATED COUNT: 14.7 % (ref 11.5–15)
ERYTHROCYTE [SEDIMENTATION RATE] IN BLOOD BY WESTERGREN METHOD: 80 MM/HR (ref 0–20)
GFR, ESTIMATED: 4 ML/MIN/1.73M2
GLUCOSE BLD-MCNC: 149 MG/DL (ref 74–99)
GLUCOSE BLD-MCNC: 158 MG/DL (ref 74–99)
GLUCOSE BLD-MCNC: 159 MG/DL (ref 74–99)
GLUCOSE BLD-MCNC: 188 MG/DL (ref 74–99)
GLUCOSE BLD-MCNC: 345 MG/DL (ref 74–99)
GLUCOSE BLD-MCNC: 455 MG/DL (ref 74–99)
GLUCOSE BLD-MCNC: 481 MG/DL (ref 74–99)
GLUCOSE BLD-MCNC: >500 MG/DL (ref 74–99)
GLUCOSE BLD-MCNC: >500 MG/DL (ref 74–99)
GLUCOSE BLD-MCNC: >700 MG/DL (ref 74–99)
GLUCOSE SERPL-MCNC: 559 MG/DL (ref 74–99)
GLUCOSE SERPL-MCNC: 739 MG/DL (ref 74–99)
GLUCOSE SERPL-MCNC: 740 MG/DL (ref 74–99)
HBA1C MFR BLD: 8.7 % (ref 4–5.6)
HCG SERPL QL: NEGATIVE
HCT VFR BLD AUTO: 20.5 % (ref 34–48)
HCT VFR BLD AUTO: 23.1 % (ref 34–48)
HGB BLD-MCNC: 6.7 G/DL (ref 11.5–15.5)
HGB BLD-MCNC: 7.4 G/DL (ref 11.5–15.5)
IMM GRANULOCYTES # BLD AUTO: <0.03 K/UL (ref 0–0.58)
IMM GRANULOCYTES NFR BLD: 0 % (ref 0–5)
LACTATE BLDV-SCNC: 1.9 MMOL/L (ref 0.5–2.2)
LACTATE BLDV-SCNC: 5.2 MMOL/L (ref 0.5–2.2)
LIPASE SERPL-CCNC: 34 U/L (ref 13–60)
LYMPHOCYTES NFR BLD: 0.59 K/UL (ref 1.5–4)
LYMPHOCYTES RELATIVE PERCENT: 10 % (ref 20–42)
MAGNESIUM SERPL-MCNC: 2.2 MG/DL (ref 1.6–2.6)
MAGNESIUM SERPL-MCNC: 2.2 MG/DL (ref 1.6–2.6)
MCH RBC QN AUTO: 28.5 PG (ref 26–35)
MCHC RBC AUTO-ENTMCNC: 32 G/DL (ref 32–34.5)
MCV RBC AUTO: 88.8 FL (ref 80–99.9)
MONOCYTES NFR BLD: 0.24 K/UL (ref 0.1–0.95)
MONOCYTES NFR BLD: 4 % (ref 2–12)
NEUTROPHILS NFR BLD: 84 % (ref 43–80)
NEUTS SEG NFR BLD: 5.12 K/UL (ref 1.8–7.3)
PH VENOUS: 7.24 (ref 7.35–7.45)
PHOSPHATE SERPL-MCNC: 4.8 MG/DL (ref 2.5–4.5)
PLATELET # BLD AUTO: 238 K/UL (ref 130–450)
PMV BLD AUTO: 9.9 FL (ref 7–12)
POC ANION GAP: 14 MMOL/L (ref 7–16)
POTASSIUM BLD-SCNC: 4.6 MMOL/L (ref 3.5–5)
POTASSIUM SERPL-SCNC: 4.5 MMOL/L (ref 3.5–5.1)
POTASSIUM SERPL-SCNC: 4.9 MMOL/L (ref 3.5–5.1)
POTASSIUM SERPL-SCNC: 5.7 MMOL/L (ref 3.5–5.1)
PROCALCITONIN SERPL-MCNC: 2.22 NG/ML (ref 0–0.08)
PROT SERPL-MCNC: 7.7 G/DL (ref 6.4–8.3)
RBC # BLD AUTO: 2.6 M/UL (ref 3.5–5.5)
RBC # BLD: ABNORMAL 10*6/UL
SODIUM BLD-SCNC: 126 MMOL/L (ref 132–146)
SODIUM SERPL-SCNC: 129 MMOL/L (ref 136–145)
SODIUM SERPL-SCNC: 130 MMOL/L (ref 136–145)
SODIUM SERPL-SCNC: 133 MMOL/L (ref 136–145)
TROPONIN I SERPL HS-MCNC: 178 NG/L (ref 0–14)
TROPONIN I SERPL HS-MCNC: 186 NG/L (ref 0–14)
TROPONIN I SERPL HS-MCNC: 188 NG/L (ref 0–14)
WBC OTHER # BLD: 6.1 K/UL (ref 4.5–11.5)

## 2025-06-17 PROCEDURE — 80048 BASIC METABOLIC PNL TOTAL CA: CPT

## 2025-06-17 PROCEDURE — 86900 BLOOD TYPING SEROLOGIC ABO: CPT

## 2025-06-17 PROCEDURE — 80053 COMPREHEN METABOLIC PANEL: CPT

## 2025-06-17 PROCEDURE — 82010 KETONE BODYS QUAN: CPT

## 2025-06-17 PROCEDURE — 99285 EMERGENCY DEPT VISIT HI MDM: CPT

## 2025-06-17 PROCEDURE — 99223 1ST HOSP IP/OBS HIGH 75: CPT | Performed by: INTERNAL MEDICINE

## 2025-06-17 PROCEDURE — 99291 CRITICAL CARE FIRST HOUR: CPT | Performed by: INTERNAL MEDICINE

## 2025-06-17 PROCEDURE — 6360000002 HC RX W HCPCS

## 2025-06-17 PROCEDURE — 86850 RBC ANTIBODY SCREEN: CPT

## 2025-06-17 PROCEDURE — 87040 BLOOD CULTURE FOR BACTERIA: CPT

## 2025-06-17 PROCEDURE — 83690 ASSAY OF LIPASE: CPT

## 2025-06-17 PROCEDURE — 82565 ASSAY OF CREATININE: CPT

## 2025-06-17 PROCEDURE — 36430 TRANSFUSION BLD/BLD COMPNT: CPT

## 2025-06-17 PROCEDURE — 90935 HEMODIALYSIS ONE EVALUATION: CPT

## 2025-06-17 PROCEDURE — 99291 CRITICAL CARE FIRST HOUR: CPT | Performed by: STUDENT IN AN ORGANIZED HEALTH CARE EDUCATION/TRAINING PROGRAM

## 2025-06-17 PROCEDURE — 87081 CULTURE SCREEN ONLY: CPT

## 2025-06-17 PROCEDURE — 83735 ASSAY OF MAGNESIUM: CPT

## 2025-06-17 PROCEDURE — 93005 ELECTROCARDIOGRAM TRACING: CPT

## 2025-06-17 PROCEDURE — 6360000002 HC RX W HCPCS: Performed by: INTERNAL MEDICINE

## 2025-06-17 PROCEDURE — 2500000003 HC RX 250 WO HCPCS

## 2025-06-17 PROCEDURE — 80051 ELECTROLYTE PANEL: CPT

## 2025-06-17 PROCEDURE — 2000000000 HC ICU R&B

## 2025-06-17 PROCEDURE — 6360000004 HC RX CONTRAST MEDICATION: Performed by: RADIOLOGY

## 2025-06-17 PROCEDURE — 85652 RBC SED RATE AUTOMATED: CPT

## 2025-06-17 PROCEDURE — 2500000003 HC RX 250 WO HCPCS: Performed by: INTERNAL MEDICINE

## 2025-06-17 PROCEDURE — 84100 ASSAY OF PHOSPHORUS: CPT

## 2025-06-17 PROCEDURE — 84484 ASSAY OF TROPONIN QUANT: CPT

## 2025-06-17 PROCEDURE — 93010 ELECTROCARDIOGRAM REPORT: CPT | Performed by: INTERNAL MEDICINE

## 2025-06-17 PROCEDURE — 2700000000 HC OXYGEN THERAPY PER DAY

## 2025-06-17 PROCEDURE — 85018 HEMOGLOBIN: CPT

## 2025-06-17 PROCEDURE — 82800 BLOOD PH: CPT

## 2025-06-17 PROCEDURE — 6370000000 HC RX 637 (ALT 250 FOR IP): Performed by: INTERNAL MEDICINE

## 2025-06-17 PROCEDURE — 71275 CT ANGIOGRAPHY CHEST: CPT

## 2025-06-17 PROCEDURE — 86140 C-REACTIVE PROTEIN: CPT

## 2025-06-17 PROCEDURE — 83880 ASSAY OF NATRIURETIC PEPTIDE: CPT

## 2025-06-17 PROCEDURE — 0202U NFCT DS 22 TRGT SARS-COV-2: CPT

## 2025-06-17 PROCEDURE — 84520 ASSAY OF UREA NITROGEN: CPT

## 2025-06-17 PROCEDURE — 83036 HEMOGLOBIN GLYCOSYLATED A1C: CPT

## 2025-06-17 PROCEDURE — 5A1D70Z PERFORMANCE OF URINARY FILTRATION, INTERMITTENT, LESS THAN 6 HOURS PER DAY: ICD-10-PCS | Performed by: STUDENT IN AN ORGANIZED HEALTH CARE EDUCATION/TRAINING PROGRAM

## 2025-06-17 PROCEDURE — 85025 COMPLETE CBC W/AUTO DIFF WBC: CPT

## 2025-06-17 PROCEDURE — 84703 CHORIONIC GONADOTROPIN ASSAY: CPT

## 2025-06-17 PROCEDURE — 82962 GLUCOSE BLOOD TEST: CPT

## 2025-06-17 PROCEDURE — 74177 CT ABD & PELVIS W/CONTRAST: CPT

## 2025-06-17 PROCEDURE — 36415 COLL VENOUS BLD VENIPUNCTURE: CPT

## 2025-06-17 PROCEDURE — 2580000003 HC RX 258

## 2025-06-17 PROCEDURE — 82947 ASSAY GLUCOSE BLOOD QUANT: CPT

## 2025-06-17 PROCEDURE — 86901 BLOOD TYPING SEROLOGIC RH(D): CPT

## 2025-06-17 PROCEDURE — 83605 ASSAY OF LACTIC ACID: CPT

## 2025-06-17 PROCEDURE — P9016 RBC LEUKOCYTES REDUCED: HCPCS

## 2025-06-17 PROCEDURE — 86923 COMPATIBILITY TEST ELECTRIC: CPT

## 2025-06-17 PROCEDURE — 71045 X-RAY EXAM CHEST 1 VIEW: CPT

## 2025-06-17 PROCEDURE — 30233N1 TRANSFUSION OF NONAUTOLOGOUS RED BLOOD CELLS INTO PERIPHERAL VEIN, PERCUTANEOUS APPROACH: ICD-10-PCS | Performed by: STUDENT IN AN ORGANIZED HEALTH CARE EDUCATION/TRAINING PROGRAM

## 2025-06-17 PROCEDURE — 85014 HEMATOCRIT: CPT

## 2025-06-17 PROCEDURE — 84145 PROCALCITONIN (PCT): CPT

## 2025-06-17 PROCEDURE — 6370000000 HC RX 637 (ALT 250 FOR IP)

## 2025-06-17 RX ORDER — DIPHENHYDRAMINE HCL 25 MG
25 TABLET ORAL EVERY 6 HOURS PRN
Status: DISCONTINUED | OUTPATIENT
Start: 2025-06-17 | End: 2025-06-21 | Stop reason: HOSPADM

## 2025-06-17 RX ORDER — CALCIUM GLUCONATE 20 MG/ML
1000 INJECTION, SOLUTION INTRAVENOUS ONCE
Status: COMPLETED | OUTPATIENT
Start: 2025-06-17 | End: 2025-06-17

## 2025-06-17 RX ORDER — DOCUSATE SODIUM 100 MG/1
100 CAPSULE, LIQUID FILLED ORAL DAILY
Status: DISCONTINUED | OUTPATIENT
Start: 2025-06-18 | End: 2025-06-21 | Stop reason: HOSPADM

## 2025-06-17 RX ORDER — HYDRALAZINE HYDROCHLORIDE 20 MG/ML
10 INJECTION INTRAMUSCULAR; INTRAVENOUS EVERY 6 HOURS PRN
Status: DISCONTINUED | OUTPATIENT
Start: 2025-06-17 | End: 2025-06-21 | Stop reason: HOSPADM

## 2025-06-17 RX ORDER — NEPHROCAP 1 MG
1 CAP ORAL DAILY
Status: DISCONTINUED | OUTPATIENT
Start: 2025-06-18 | End: 2025-06-21 | Stop reason: HOSPADM

## 2025-06-17 RX ORDER — IOPAMIDOL 755 MG/ML
75 INJECTION, SOLUTION INTRAVASCULAR
Status: COMPLETED | OUTPATIENT
Start: 2025-06-17 | End: 2025-06-17

## 2025-06-17 RX ORDER — ACETAMINOPHEN 325 MG/1
650 TABLET ORAL EVERY 6 HOURS PRN
Status: DISCONTINUED | OUTPATIENT
Start: 2025-06-17 | End: 2025-06-21 | Stop reason: HOSPADM

## 2025-06-17 RX ORDER — SODIUM CHLORIDE 0.9 % (FLUSH) 0.9 %
5-40 SYRINGE (ML) INJECTION PRN
Status: DISCONTINUED | OUTPATIENT
Start: 2025-06-17 | End: 2025-06-18

## 2025-06-17 RX ORDER — DIPHENHYDRAMINE HYDROCHLORIDE 50 MG/ML
25 INJECTION, SOLUTION INTRAMUSCULAR; INTRAVENOUS ONCE
Status: COMPLETED | OUTPATIENT
Start: 2025-06-17 | End: 2025-06-17

## 2025-06-17 RX ORDER — ASPIRIN 81 MG/1
81 TABLET ORAL 2 TIMES DAILY
Status: DISCONTINUED | OUTPATIENT
Start: 2025-06-17 | End: 2025-06-21 | Stop reason: HOSPADM

## 2025-06-17 RX ORDER — LANOLIN ALCOHOL/MO/W.PET/CERES
500 CREAM (GRAM) TOPICAL DAILY
Status: DISCONTINUED | OUTPATIENT
Start: 2025-06-18 | End: 2025-06-21 | Stop reason: HOSPADM

## 2025-06-17 RX ORDER — FENTANYL CITRATE 50 UG/ML
25 INJECTION, SOLUTION INTRAMUSCULAR; INTRAVENOUS
Status: DISCONTINUED | OUTPATIENT
Start: 2025-06-17 | End: 2025-06-18

## 2025-06-17 RX ORDER — SODIUM CHLORIDE 9 MG/ML
INJECTION, SOLUTION INTRAVENOUS PRN
Status: DISCONTINUED | OUTPATIENT
Start: 2025-06-17 | End: 2025-06-21 | Stop reason: HOSPADM

## 2025-06-17 RX ORDER — ALBUTEROL SULFATE 0.83 MG/ML
2.5 SOLUTION RESPIRATORY (INHALATION) 4 TIMES DAILY PRN
Status: DISCONTINUED | OUTPATIENT
Start: 2025-06-17 | End: 2025-06-21 | Stop reason: HOSPADM

## 2025-06-17 RX ORDER — GABAPENTIN 100 MG/1
100 CAPSULE ORAL 2 TIMES DAILY
Status: DISCONTINUED | OUTPATIENT
Start: 2025-06-17 | End: 2025-06-21 | Stop reason: HOSPADM

## 2025-06-17 RX ORDER — SODIUM CHLORIDE 9 MG/ML
INJECTION, SOLUTION INTRAVENOUS PRN
Status: DISCONTINUED | OUTPATIENT
Start: 2025-06-17 | End: 2025-06-18

## 2025-06-17 RX ORDER — METOPROLOL TARTRATE 25 MG/1
12.5 TABLET, FILM COATED ORAL 2 TIMES DAILY
Status: DISCONTINUED | OUTPATIENT
Start: 2025-06-17 | End: 2025-06-21 | Stop reason: HOSPADM

## 2025-06-17 RX ORDER — MAGNESIUM SULFATE IN WATER 40 MG/ML
2000 INJECTION, SOLUTION INTRAVENOUS PRN
Status: DISCONTINUED | OUTPATIENT
Start: 2025-06-17 | End: 2025-06-18

## 2025-06-17 RX ORDER — POLYETHYLENE GLYCOL 3350 17 G/17G
17 POWDER, FOR SOLUTION ORAL DAILY PRN
Status: DISCONTINUED | OUTPATIENT
Start: 2025-06-17 | End: 2025-06-21 | Stop reason: HOSPADM

## 2025-06-17 RX ORDER — POTASSIUM CHLORIDE 7.45 MG/ML
10 INJECTION INTRAVENOUS PRN
Status: DISCONTINUED | OUTPATIENT
Start: 2025-06-17 | End: 2025-06-18

## 2025-06-17 RX ORDER — MIDODRINE HYDROCHLORIDE 2.5 MG/1
2.5 TABLET ORAL 2 TIMES DAILY WITH MEALS
Status: DISCONTINUED | OUTPATIENT
Start: 2025-06-17 | End: 2025-06-21 | Stop reason: HOSPADM

## 2025-06-17 RX ORDER — SODIUM CHLORIDE 0.9 % (FLUSH) 0.9 %
5-40 SYRINGE (ML) INJECTION EVERY 12 HOURS SCHEDULED
Status: DISCONTINUED | OUTPATIENT
Start: 2025-06-17 | End: 2025-06-18

## 2025-06-17 RX ORDER — ONDANSETRON 2 MG/ML
4 INJECTION INTRAMUSCULAR; INTRAVENOUS EVERY 6 HOURS PRN
Status: DISCONTINUED | OUTPATIENT
Start: 2025-06-17 | End: 2025-06-21 | Stop reason: HOSPADM

## 2025-06-17 RX ORDER — SODIUM CHLORIDE 0.9 % (FLUSH) 0.9 %
5-40 SYRINGE (ML) INJECTION PRN
Status: DISCONTINUED | OUTPATIENT
Start: 2025-06-17 | End: 2025-06-21 | Stop reason: HOSPADM

## 2025-06-17 RX ORDER — SODIUM CHLORIDE 450 MG/100ML
INJECTION, SOLUTION INTRAVENOUS CONTINUOUS
Status: DISCONTINUED | OUTPATIENT
Start: 2025-06-17 | End: 2025-06-18

## 2025-06-17 RX ORDER — FERROUS SULFATE 325(65) MG
325 TABLET ORAL EVERY OTHER DAY
Status: DISCONTINUED | OUTPATIENT
Start: 2025-06-18 | End: 2025-06-21 | Stop reason: HOSPADM

## 2025-06-17 RX ORDER — ONDANSETRON 4 MG/1
4 TABLET, ORALLY DISINTEGRATING ORAL EVERY 8 HOURS PRN
Status: DISCONTINUED | OUTPATIENT
Start: 2025-06-17 | End: 2025-06-21 | Stop reason: HOSPADM

## 2025-06-17 RX ORDER — PANTOPRAZOLE SODIUM 40 MG/1
40 TABLET, DELAYED RELEASE ORAL
Status: DISCONTINUED | OUTPATIENT
Start: 2025-06-18 | End: 2025-06-21 | Stop reason: HOSPADM

## 2025-06-17 RX ORDER — ACETAMINOPHEN 650 MG/1
650 SUPPOSITORY RECTAL EVERY 6 HOURS PRN
Status: DISCONTINUED | OUTPATIENT
Start: 2025-06-17 | End: 2025-06-21 | Stop reason: HOSPADM

## 2025-06-17 RX ORDER — LIDOCAINE 4 G/G
1 PATCH TOPICAL DAILY
Status: DISCONTINUED | OUTPATIENT
Start: 2025-06-17 | End: 2025-06-21 | Stop reason: HOSPADM

## 2025-06-17 RX ORDER — BISACODYL 10 MG
10 SUPPOSITORY, RECTAL RECTAL DAILY PRN
Status: DISCONTINUED | OUTPATIENT
Start: 2025-06-17 | End: 2025-06-21 | Stop reason: HOSPADM

## 2025-06-17 RX ORDER — LABETALOL HYDROCHLORIDE 5 MG/ML
10 INJECTION, SOLUTION INTRAVENOUS EVERY 6 HOURS PRN
Status: DISCONTINUED | OUTPATIENT
Start: 2025-06-17 | End: 2025-06-21 | Stop reason: HOSPADM

## 2025-06-17 RX ORDER — SODIUM CHLORIDE 0.9 % (FLUSH) 0.9 %
5-40 SYRINGE (ML) INJECTION EVERY 12 HOURS SCHEDULED
Status: DISCONTINUED | OUTPATIENT
Start: 2025-06-17 | End: 2025-06-21 | Stop reason: HOSPADM

## 2025-06-17 RX ORDER — DEXTROSE MONOHYDRATE AND SODIUM CHLORIDE 5; .45 G/100ML; G/100ML
INJECTION, SOLUTION INTRAVENOUS CONTINUOUS PRN
Status: DISCONTINUED | OUTPATIENT
Start: 2025-06-17 | End: 2025-06-21 | Stop reason: HOSPADM

## 2025-06-17 RX ADMIN — DEXTROSE AND SODIUM CHLORIDE: 5; .45 INJECTION, SOLUTION INTRAVENOUS at 20:48

## 2025-06-17 RX ADMIN — ACETAMINOPHEN 650 MG: 325 TABLET ORAL at 23:31

## 2025-06-17 RX ADMIN — POTASSIUM CHLORIDE 10 MEQ: 7.46 INJECTION, SOLUTION INTRAVENOUS at 19:55

## 2025-06-17 RX ADMIN — DIPHENHYDRAMINE HYDROCHLORIDE 25 MG: 50 INJECTION INTRAMUSCULAR; INTRAVENOUS at 19:20

## 2025-06-17 RX ADMIN — GABAPENTIN 100 MG: 100 CAPSULE ORAL at 20:05

## 2025-06-17 RX ADMIN — SODIUM CHLORIDE: 0.45 INJECTION, SOLUTION INTRAVENOUS at 15:07

## 2025-06-17 RX ADMIN — LABETALOL HYDROCHLORIDE 10 MG: 5 INJECTION, SOLUTION INTRAVENOUS at 22:34

## 2025-06-17 RX ADMIN — CALCIUM GLUCONATE 1000 MG: 20 INJECTION, SOLUTION INTRAVENOUS at 14:46

## 2025-06-17 RX ADMIN — FENTANYL CITRATE 25 MCG: 50 INJECTION INTRAMUSCULAR; INTRAVENOUS at 16:31

## 2025-06-17 RX ADMIN — SODIUM CHLORIDE, PRESERVATIVE FREE 10 ML: 5 INJECTION INTRAVENOUS at 19:48

## 2025-06-17 RX ADMIN — METOPROLOL TARTRATE 12.5 MG: 25 TABLET, FILM COATED ORAL at 20:05

## 2025-06-17 RX ADMIN — SODIUM CHLORIDE: 0.45 INJECTION, SOLUTION INTRAVENOUS at 19:48

## 2025-06-17 RX ADMIN — POTASSIUM CHLORIDE 10 MEQ: 7.46 INJECTION, SOLUTION INTRAVENOUS at 21:41

## 2025-06-17 RX ADMIN — SODIUM CHLORIDE, PRESERVATIVE FREE 10 ML: 5 INJECTION INTRAVENOUS at 19:49

## 2025-06-17 RX ADMIN — ONDANSETRON 4 MG: 2 INJECTION, SOLUTION INTRAMUSCULAR; INTRAVENOUS at 15:08

## 2025-06-17 RX ADMIN — SODIUM CHLORIDE 10.8 UNITS/HR: 9 INJECTION, SOLUTION INTRAVENOUS at 15:06

## 2025-06-17 RX ADMIN — PIPERACILLIN SODIUM AND TAZOBACTAM SODIUM 4500 MG: 4; .5 INJECTION, POWDER, LYOPHILIZED, FOR SOLUTION INTRAVENOUS at 15:15

## 2025-06-17 RX ADMIN — IOPAMIDOL 75 ML: 755 INJECTION, SOLUTION INTRAVENOUS at 18:58

## 2025-06-17 RX ADMIN — ASPIRIN 81 MG: 81 TABLET, COATED ORAL at 20:05

## 2025-06-17 ASSESSMENT — PAIN SCALES - GENERAL
PAINLEVEL_OUTOF10: 0
PAINLEVEL_OUTOF10: 0
PAINLEVEL_OUTOF10: 6
PAINLEVEL_OUTOF10: 8

## 2025-06-17 ASSESSMENT — PAIN DESCRIPTION - LOCATION
LOCATION: KNEE
LOCATION: LEG

## 2025-06-17 ASSESSMENT — PAIN DESCRIPTION - DESCRIPTORS: DESCRIPTORS: ACHING

## 2025-06-17 ASSESSMENT — PAIN DESCRIPTION - ORIENTATION
ORIENTATION: RIGHT
ORIENTATION: LEFT

## 2025-06-17 ASSESSMENT — PAIN - FUNCTIONAL ASSESSMENT: PAIN_FUNCTIONAL_ASSESSMENT: ACTIVITIES ARE NOT PREVENTED

## 2025-06-17 NOTE — CONSULTS
Department of Internal Medicine  Nephrology Consult Note    SUBJECTIVE:  We are following this patient for end-stage renal failure .     Full consult was deferred as patient is followed longitudinally in the outpatient setting at [facility] on hemodialysis TTS.  Patient is a 39-year-old female who presented to the emergency department with complaints of just not feeling right.  She states her last dialysis treatment was on Friday for her Saturday treatment as she has her daughter who has special needs on the weekends.  She tells me she has been trying to get switched to a MWF schedule at dialysis but has not been able to as at this point.  She states she has had some problems with pancreatitis and has an upset stomach at this time.  She appears somewhat short of breath has oxygen on.  She also tells us that she has an appointment with vascular surgery for an outpatient procedure tomorrow.  Based on this we will consult vascular surgery so that they may possibly be able to have her surgery done tomorrow as an inpatient.      PROBLEM LIST:    Patient Active Problem List   Diagnosis    Poorly controlled type 1 diabetes mellitus (HCC)    Brittle diabetes (HCC)    Type 1 diabetes mellitus with ketoacidosis without coma (HCC)    Uncontrolled type 1 diabetes mellitus with hyperglycemia (HCC)    Hyperkalemia    Nasal polyp    Periorbital cellulitis of right eye    Periorbital cellulitis    Vitamin B deficiency, unspecified    Nasal congestion    Hidradenitis suppurativa    Symptomatic anemia    Normocytic anemia due to blood loss    Acute decompensated heart failure (HCC)    Gall stone    Dietary noncompliance    Metabolic encephalopathy    Hypoxia    Hypotension    History of venous thromboembolism    Chronic systolic (congestive) heart failure    MRSA colonization    ESRD on hemodialysis (HCC)    SOB (shortness of breath)    ESRD needing dialysis ([facility])    Encounter regarding vascular access for dialysis for end-stage

## 2025-06-17 NOTE — PLAN OF CARE
Problem: Chronic Conditions and Co-morbidities  Goal: Patient's chronic conditions and co-morbidity symptoms are monitored and maintained or improved  Outcome: Progressing  Flowsheets (Taken 6/17/2025 1717)  Care Plan - Patient's Chronic Conditions and Co-Morbidity Symptoms are Monitored and Maintained or Improved:   Monitor and assess patient's chronic conditions and comorbid symptoms for stability, deterioration, or improvement   Collaborate with multidisciplinary team to address chronic and comorbid conditions and prevent exacerbation or deterioration   Update acute care plan with appropriate goals if chronic or comorbid symptoms are exacerbated and prevent overall improvement and discharge     Problem: Discharge Planning  Goal: Discharge to home or other facility with appropriate resources  Outcome: Progressing  Flowsheets (Taken 6/17/2025 1717)  Discharge to home or other facility with appropriate resources:   Identify barriers to discharge with patient and caregiver   Arrange for needed discharge resources and transportation as appropriate   Identify discharge learning needs (meds, wound care, etc)   Arrange for interpreters to assist at discharge as needed   Refer to discharge planning if patient needs post-hospital services based on physician order or complex needs related to functional status, cognitive ability or social support system

## 2025-06-17 NOTE — CARE COORDINATION
06/17/25 Case Management note: Chart reviewed as patient is a return to the ER within 30 days of discharge to home. She is a dialysis patient. She was dialized on Friday for Saturday this past week. She is c/o \"not feeling well\". She attends [facility] on TTS. She is to have dialysis today with fluid removal. Currently she remains on the ER and has not been admitted.Electronically signed by Clair Cardoza RN CM on 6/17/2025 at 2:15 PM

## 2025-06-17 NOTE — TELEPHONE ENCOUNTER
Sivan from [facility] called her to schedule and she told her she wanted to hold off due to her schedule for dialysis and several follow up appointments she will call then when she is ready after all that. So she will need another referral scripts.

## 2025-06-17 NOTE — CONSULTS
Vascular Surgery Inpatient Consultation Note      Reason for Consultation: Consideration for fistulogram    HISTORY OF PRESENT ILLNESS:                The patient is a 39 y.o. female who is admitted to the hospital for treatment of diabetic ketoacidosis.  Patient is well-known to my partner Dr Zaidi but has performed multiple access procedures on her, she currently dialyzes through a left upper extremity brachiobasilic AV fistula that has required multiple interventions.  She was scheduled for fistulogram for June 18, 2025 given concern for central venous stenosis.  She is currently admitted to the hospital to the MICU for diabetic ketoacidosis.  She has had multiple admissions similar to this for the same problem.  Vascular surgery is consulted for consideration of possible fistulogram.  She is able to tell me that she last underwent dialysis on Friday without issue, reported to run the circuit just fine but with significant pain.    IMPRESSION: She is on insulin drip and is barely conversant, she has noted to have dajuan facial and upper extremity plethora as well.  Access is functioning.  From a vascular standpoint, she does need fistulogram but this can be done on an elective basis or at least when she is more stabilized.  She is currently in no shape to undergo fistulogram.    RECOMMENDATIONS: Recommend medical optimization of her profound hyperglycemia, will discuss with my partner timing of eventual fistulogram once her medical issues are resolved and she is in a more stable state.  Thanks for the consult.    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

## 2025-06-17 NOTE — ED NOTES
Radiology Procedure Waiver   Name: Michelle Nettles  : 1985  MRN: [id number]    Date:  25    Time: 3:03 PM EDT    Benefits of immediately proceeding with Radiology exam(s) without pre-testing outweigh the risks or are not indicated as specified below and therefore the following is/are being waived:    [] Pregnancy test   [] Patients LMP on-time and regular.   [] Patient had Tubal Ligation or has other Contraception Device.   [] Patient  is Menopausal or Premenarcheal.    [] Patient had Full or Partial Hysterectomy.    [] Protocol for Iodine allergy    [] MRI Questionnaire     [x] BUN/Creatinine   [] Patient age w/no hx of renal dysfunction.   [x] Patient on Dialysis.   [] Recent Normal Labs.  Electronically signed by Glenys Nolen DO on 25 at 3:03 PM EDT

## 2025-06-17 NOTE — H&P
[facility] [place] of Occupational Health - Occupational Stress Questionnaire     Feeling of Stress : Only a little   Social Connections: Moderately Isolated (1/29/2025)    Social Connection and Isolation Panel [NHANES]     Frequency of Communication with Friends and Family: More than three times a week     Frequency of Social Gatherings with Friends and Family: More than three times a week     Attends Restorationism Services: More than 4 times per year     Active Member of Clubs or Organizations: No     Attends Club or Organization Meetings: Never     Marital Status:    Intimate Partner Violence: Not on file   Housing Stability: Low Risk  (5/26/2025)    Housing Stability Vital Sign     Unable to Pay for Housing in the Last Year: No     Number of Times Moved in the Last Year: 1     Homeless in the Last Year: No       Review of Systems  All bolded are positive; please see HPI  General:  Fever, chills, diaphoresis, fatigue, malaise, night sweats, weight loss  Psychological:  Anxiety, disorientation, hallucinations.  ENT:  Epistaxis, headaches, vertigo, visual changes.  Cardiovascular:  Chest pain, irregular heartbeats, palpitations, paroxysmal nocturnal dyspnea.  Respiratory:  Shortness of breath, coughing, sputum production, hemoptysis, wheezing, orthopnea.  Gastrointestinal:  Nausea, vomiting, diarrhea, heartburn, constipation, abdominal pain, hematemesis, hematochezia, melena, acholic stools  Genito-Urinary:  Dysuria, urgency, frequency, hematuria  Musculoskeletal:  Joint pain, joint stiffness, joint swelling, muscle pain  Neurology:  Headache, focal neurological deficits, weakness, numbness, paresthesia  Derm:  Rashes, ulcers, excoriations, bruising  Extremities:  Decreased ROM, peripheral edema, mottling    Physical Examination  Vitals:  BP (!) 181/92   Pulse (!) 115   Temp 98 °F (36.7 °C)   Resp 19   SpO2 94%   General Appearance:  awake, alert, and oriented to person, place, time, and purpose; appears  0.13 0.05 - 0.50 k/uL    Basophils Absolute 0.02 0.00 - 0.20 k/uL    Immature Granulocytes Absolute <0.03 0.00 - 0.58 k/uL    RBC Morphology 1+ ANISOCYTOSIS     RBC Morphology 1+ JEMAL CELLS     RBC Morphology 1+ OVALOCYTES     RBC Morphology 1+ POIKILOCYTOSIS     RBC Morphology 1+ POLYCHROMASIA    CMP    Collection Time: 06/17/25 12:24 PM   Result Value Ref Range    Sodium 129 (L) 136 - 145 mmol/L    Potassium 5.7 (H) 3.5 - 5.1 mmol/L    Chloride 86 (L) 98 - 107 mmol/L    CO2 13 (L) 22 - 29 mmol/L    Anion Gap 30 (H) 7 - 16 mmol/L    Glucose 739 (HH) 74 - 99 mg/dL    BUN 56 (H) 6 - 20 mg/dL    Creatinine 11.3 (HH) 0.5 - 1.0 mg/dL    Est, Glom Filt Rate 4 (L) >60 mL/min/1.73m2    Calcium 8.9 8.6 - 10.0 mg/dL    Total Protein 7.7 6.4 - 8.3 g/dL    Albumin 3.6 3.5 - 5.2 g/dL    Total Bilirubin 0.3 0.0 - 1.2 mg/dL    Alkaline Phosphatase 153 (H) 35 - 104 U/L    ALT 5 0 - 35 U/L    AST 11 0 - 35 U/L   Magnesium    Collection Time: 06/17/25 12:24 PM   Result Value Ref Range    Magnesium 2.2 1.6 - 2.6 mg/dL   Lactic Acid    Collection Time: 06/17/25 12:24 PM   Result Value Ref Range    Lactic Acid 1.9 0.5 - 2.2 mmol/L   Lipase    Collection Time: 06/17/25 12:24 PM   Result Value Ref Range    Lipase 34 13 - 60 U/L   Troponin    Collection Time: 06/17/25 12:24 PM   Result Value Ref Range    Troponin, High Sensitivity 188 (H) 0 - 14 ng/L   Brain Natriuretic Peptide    Collection Time: 06/17/25 12:24 PM   Result Value Ref Range    NT Pro-BNP 32,924 (H) 0 - 125 pg/mL   HCG, SERUM, QUALITATIVE    Collection Time: 06/17/25 12:24 PM   Result Value Ref Range    Preg, Serum NEGATIVE NEGATIVE   Hemoglobin A1c    Collection Time: 06/17/25 12:24 PM   Result Value Ref Range    Hemoglobin A1C 8.7 (H) 4.0 - 5.6 %   POCT Glucose    Collection Time: 06/17/25 12:51 PM   Result Value Ref Range    POC Glucose >500 (HH) 74 - 99 mg/dL   Beta-Hydroxybutyrate    Collection Time: 06/17/25  1:07 PM   Result Value Ref Range    Beta-Hydroxybutyrate

## 2025-06-17 NOTE — DIALYSIS
Dialysis ready to dialyze patient at 1800.  Inquired regarding CT scan and nursing staff stated 1930 but CT staff stated she could be done sooner.  Will follow up and initiate hemodialysis as soon as possible.

## 2025-06-17 NOTE — ED PROVIDER NOTES
[facility]  EMERGENCY DEPARTMENT ENCOUNTER        Pt Name: Michelle Nettles  MRN: [id number]  Birthdate 1985  Date of evaluation: 6/17/2025  Provider: Glenys Nolen DO  PCP: Rogers Rodríguez MD  Note Started: 12:11 PM EDT 6/17/25    CHIEF COMPLAINT       Chief Complaint   Patient presents with    Abdominal Pain    Hyperglycemia     Abdominal pain, hyperglycemia (578), recent dx of pancreatitis. Missed dialysis today.        HISTORY OF PRESENT ILLNESS: 1 or more Elements   History From: Patient    Limitations to history : None  Social Determinants : None    Michelle Nettles is a 39 y.o. female who presents for abdominal pain.  Patient states that she missed dialysis today because she was not feeling well.  She states she was short of breath.  She also has had some burning sensation in her upper abdomen.  She was recently diagnosed with pancreatitis.  Patient also has had elevated glucose.  Patient states her last dialysis was on Friday.  She denies any swelling in the legs.  She denies any cough or congestion.  Patient has been having some drainage from her left ear.  She states this started a few days ago.  She denies any pain in the ear.  She does have a ear tube in that ear.  Denies any fever, chills, n/v, headache, dizziness, vision changes, neck tenderness or stiffness, weakness, cp, palpitations, dysuria, hematuria, diarrhea, constipation, bloody stools.    Nursing Notes were all reviewed and agreed with or any disagreements were addressed in the HPI.    ROS:   Pertinent positives and negatives are stated within HPI, all other systems reviewed and are negative.      --------------------------------------------- PAST HISTORY ---------------------------------------------  Past Medical History:  has a past medical history of JOLLY (acute kidney injury), Anemia, AVF (arteriovenous fistula), Cellulitis, face, CHF (congestive heart failure) (HCC), Chronic kidney disease, Chronic  ESRD (HCC) (07/12/2018), ESRD (end stage renal disease) (HCC), Hemodialysis patient, Hidradenitis suppurativa, Hypercalcemia, Hyperkalemia, Hypertension, Iron deficiency anemia secondary to blood loss (chronic), Other acute gastritis without hemorrhage, Tobacco abuse (04/05/2016), Type 2 diabetes mellitus with hyperglycemia (HCC), and Vitamin B-complex deficiency.       Myself and ED attending interpreted findings during patient's stay.   Vital signs BP (!) 143/63   Pulse (!) 103   Temp 98.4 °F (36.9 °C)   Resp 27   Wt 72.3 kg (159 lb 6.3 oz)   SpO2 100%   BMI 32.18 kg/m²   While in the ED patient was hemodynamically stable, afebrile, nontoxic-appearing, in no respiratory distress.   Physical exam remarkable for tachycardia, minimally diminished breath sounds bilaterally, minimal epigastric pain.  Ddx: Pancreatitis, bowel obstruction, CHF, pneumonia, hyperkalemia, arrhythmia, MI  Labs interpreted by me: CMP showed hyponatremia, hyperkalemia, BUN and creatinine elevated, LFTs normal.  Glucose elevated 739 and anion gap elevated at 30.  CBC showed no leukocytosis, hemoglobin stable at 7.4.  Pregnancy was negative.  BNP was elevated 32,000.  Beta hydroxybutyrate was elevated, venous pH was low at 7.2.  EKG interpreted by me in detail on ED course.   CXR with right lower lung opacity concerning for pneumonia    Patient administered calcium for hyperkalemia.  Patient was started on insulin drip for DKA protocol.  Nephrology was consulted as patient needed dialysis.  Patient was admitted to the ICU.  Patient's CT scans were signed out to incoming resident.    Please see ED course for more details:    ED Course as of 06/18/25 0854   Tue Jun 17, 2025   1240 EKG Interpretation  Interpreted by emergency department physician.    6/17/25  Time: 12:19    Rate: 110 bpm  Axis: normal  WY: 120 ms  QRS: 74 ms  Qtc: 452 ms  Rhythm: regular  Clinical Impression: sinus tachycardia with nonspecific T wave abnormality  Comparison

## 2025-06-17 NOTE — PROGRESS NOTES
Patient admitted to MICU with the following belongings: Wallet, Purse, Cell Phone, Cell Phone , Shirt, Socks, Shoes, Jacket, and Other bags x2, bannet, gown, hygiene products. The following belongings were sent home with the patient's family:  None - belongings noted on admission remain in patient's room..

## 2025-06-18 ENCOUNTER — APPOINTMENT (OUTPATIENT)
Dept: GENERAL RADIOLOGY | Age: 40
End: 2025-06-18
Payer: MEDICARE

## 2025-06-18 ENCOUNTER — APPOINTMENT (OUTPATIENT)
Dept: ULTRASOUND IMAGING | Age: 40
End: 2025-06-18
Payer: MEDICARE

## 2025-06-18 LAB
25(OH)D3 SERPL-MCNC: 17.1 NG/ML (ref 30–100)
ABO/RH: NORMAL
ANION GAP SERPL CALCULATED.3IONS-SCNC: 14 MMOL/L (ref 7–16)
ANION GAP SERPL CALCULATED.3IONS-SCNC: 14 MMOL/L (ref 7–16)
ANION GAP SERPL CALCULATED.3IONS-SCNC: 19 MMOL/L (ref 7–16)
ANTIBODY SCREEN: NEGATIVE
ARM BAND NUMBER: NORMAL
B PARAP IS1001 DNA NPH QL NAA+NON-PROBE: NOT DETECTED
B PERT DNA SPEC QL NAA+PROBE: NOT DETECTED
B-OH-BUTYR SERPL-MCNC: 0.96 MMOL/L (ref 0.02–0.27)
BLOOD BANK BLOOD PRODUCT EXPIRATION DATE: NORMAL
BLOOD BANK DISPENSE STATUS: NORMAL
BLOOD BANK ISBT PRODUCT BLOOD TYPE: 5100
BLOOD BANK PRODUCT CODE: NORMAL
BLOOD BANK SAMPLE EXPIRATION: NORMAL
BLOOD BANK UNIT TYPE AND RH: NORMAL
BPU ID: NORMAL
BUN SERPL-MCNC: 15 MG/DL (ref 6–20)
BUN SERPL-MCNC: 17 MG/DL (ref 6–20)
BUN SERPL-MCNC: 17 MG/DL (ref 6–20)
C PNEUM DNA NPH QL NAA+NON-PROBE: NOT DETECTED
CA-I BLD-SCNC: 1.09 MMOL/L (ref 1.15–1.33)
CALCIUM SERPL-MCNC: 8 MG/DL (ref 8.6–10)
CALCIUM SERPL-MCNC: 8 MG/DL (ref 8.6–10)
CALCIUM SERPL-MCNC: 8.3 MG/DL (ref 8.6–10)
CHLORIDE SERPL-SCNC: 94 MMOL/L (ref 98–107)
CHLORIDE SERPL-SCNC: 94 MMOL/L (ref 98–107)
CHLORIDE SERPL-SCNC: 95 MMOL/L (ref 98–107)
CO2 SERPL-SCNC: 22 MMOL/L (ref 22–29)
CO2 SERPL-SCNC: 23 MMOL/L (ref 22–29)
CO2 SERPL-SCNC: 24 MMOL/L (ref 22–29)
COMPONENT: NORMAL
CREAT SERPL-MCNC: 3.6 MG/DL (ref 0.5–1)
CREAT SERPL-MCNC: 4.7 MG/DL (ref 0.5–1)
CREAT SERPL-MCNC: 4.9 MG/DL (ref 0.5–1)
CROSSMATCH RESULT: NORMAL
ERYTHROCYTE [DISTWIDTH] IN BLOOD BY AUTOMATED COUNT: 14.6 % (ref 11.5–15)
FLUAV RNA NPH QL NAA+NON-PROBE: NOT DETECTED
FLUBV RNA NPH QL NAA+NON-PROBE: NOT DETECTED
GFR, ESTIMATED: 11 ML/MIN/1.73M2
GFR, ESTIMATED: 11 ML/MIN/1.73M2
GFR, ESTIMATED: 16 ML/MIN/1.73M2
GLUCOSE BLD-MCNC: 108 MG/DL (ref 74–99)
GLUCOSE BLD-MCNC: 110 MG/DL (ref 74–99)
GLUCOSE BLD-MCNC: 120 MG/DL (ref 74–99)
GLUCOSE BLD-MCNC: 142 MG/DL (ref 74–99)
GLUCOSE BLD-MCNC: 146 MG/DL (ref 74–99)
GLUCOSE BLD-MCNC: 189 MG/DL (ref 74–99)
GLUCOSE BLD-MCNC: 190 MG/DL (ref 74–99)
GLUCOSE BLD-MCNC: 196 MG/DL (ref 74–99)
GLUCOSE BLD-MCNC: 215 MG/DL (ref 74–99)
GLUCOSE BLD-MCNC: 230 MG/DL (ref 74–99)
GLUCOSE BLD-MCNC: 242 MG/DL (ref 74–99)
GLUCOSE BLD-MCNC: 261 MG/DL (ref 74–99)
GLUCOSE BLD-MCNC: 83 MG/DL (ref 74–99)
GLUCOSE SERPL-MCNC: 198 MG/DL (ref 74–99)
GLUCOSE SERPL-MCNC: 217 MG/DL (ref 74–99)
GLUCOSE SERPL-MCNC: 317 MG/DL (ref 74–99)
HADV DNA NPH QL NAA+NON-PROBE: NOT DETECTED
HCOV 229E RNA NPH QL NAA+NON-PROBE: NOT DETECTED
HCOV HKU1 RNA NPH QL NAA+NON-PROBE: NOT DETECTED
HCOV NL63 RNA NPH QL NAA+NON-PROBE: NOT DETECTED
HCOV OC43 RNA NPH QL NAA+NON-PROBE: NOT DETECTED
HCT VFR BLD AUTO: 21.9 % (ref 34–48)
HCT VFR BLD AUTO: 22 % (ref 34–48)
HGB BLD-MCNC: 7.5 G/DL (ref 11.5–15.5)
HGB BLD-MCNC: 7.6 G/DL (ref 11.5–15.5)
HMPV RNA NPH QL NAA+NON-PROBE: NOT DETECTED
HPIV1 RNA NPH QL NAA+NON-PROBE: NOT DETECTED
HPIV2 RNA NPH QL NAA+NON-PROBE: NOT DETECTED
HPIV3 RNA NPH QL NAA+NON-PROBE: NOT DETECTED
HPIV4 RNA NPH QL NAA+NON-PROBE: NOT DETECTED
LACTATE BLDV-SCNC: 1.1 MMOL/L (ref 0.5–2.2)
M PNEUMO DNA NPH QL NAA+NON-PROBE: NOT DETECTED
MAGNESIUM SERPL-MCNC: 1.6 MG/DL (ref 1.6–2.6)
MAGNESIUM SERPL-MCNC: 1.7 MG/DL (ref 1.6–2.6)
MAGNESIUM SERPL-MCNC: 1.8 MG/DL (ref 1.6–2.6)
MCH RBC QN AUTO: 28.5 PG (ref 26–35)
MCHC RBC AUTO-ENTMCNC: 34.1 G/DL (ref 32–34.5)
MCV RBC AUTO: 83.7 FL (ref 80–99.9)
PHOSPHATE SERPL-MCNC: 2.1 MG/DL (ref 2.5–4.5)
PHOSPHATE SERPL-MCNC: 4.2 MG/DL (ref 2.5–4.5)
PHOSPHATE SERPL-MCNC: 4.3 MG/DL (ref 2.5–4.5)
PLATELET # BLD AUTO: 219 K/UL (ref 130–450)
PMV BLD AUTO: 9.7 FL (ref 7–12)
POTASSIUM SERPL-SCNC: 4 MMOL/L (ref 3.5–5.1)
POTASSIUM SERPL-SCNC: 4.1 MMOL/L (ref 3.5–5.1)
POTASSIUM SERPL-SCNC: 4.4 MMOL/L (ref 3.5–5.1)
PTH-INTACT SERPL-MCNC: 416 PG/ML (ref 15–65)
RBC # BLD AUTO: 2.63 M/UL (ref 3.5–5.5)
RSV RNA NPH QL NAA+NON-PROBE: NOT DETECTED
RV+EV RNA NPH QL NAA+NON-PROBE: NOT DETECTED
SARS-COV-2 RNA NPH QL NAA+NON-PROBE: NOT DETECTED
SODIUM SERPL-SCNC: 132 MMOL/L (ref 136–145)
SODIUM SERPL-SCNC: 132 MMOL/L (ref 136–145)
SODIUM SERPL-SCNC: 134 MMOL/L (ref 136–145)
SPECIMEN DESCRIPTION: NORMAL
TRANSFUSION STATUS: NORMAL
UNIT DIVISION: 0
UNIT ISSUE DATE/TIME: NORMAL
WBC OTHER # BLD: 5.8 K/UL (ref 4.5–11.5)

## 2025-06-18 PROCEDURE — 2580000003 HC RX 258

## 2025-06-18 PROCEDURE — 82962 GLUCOSE BLOOD TEST: CPT

## 2025-06-18 PROCEDURE — 83735 ASSAY OF MAGNESIUM: CPT

## 2025-06-18 PROCEDURE — 6370000000 HC RX 637 (ALT 250 FOR IP): Performed by: INTERNAL MEDICINE

## 2025-06-18 PROCEDURE — 2500000003 HC RX 250 WO HCPCS

## 2025-06-18 PROCEDURE — 2500000003 HC RX 250 WO HCPCS: Performed by: INTERNAL MEDICINE

## 2025-06-18 PROCEDURE — 82330 ASSAY OF CALCIUM: CPT

## 2025-06-18 PROCEDURE — 6360000002 HC RX W HCPCS: Performed by: STUDENT IN AN ORGANIZED HEALTH CARE EDUCATION/TRAINING PROGRAM

## 2025-06-18 PROCEDURE — 83970 ASSAY OF PARATHORMONE: CPT

## 2025-06-18 PROCEDURE — 6360000002 HC RX W HCPCS: Performed by: INTERNAL MEDICINE

## 2025-06-18 PROCEDURE — 6360000002 HC RX W HCPCS

## 2025-06-18 PROCEDURE — 73560 X-RAY EXAM OF KNEE 1 OR 2: CPT

## 2025-06-18 PROCEDURE — 2700000000 HC OXYGEN THERAPY PER DAY

## 2025-06-18 PROCEDURE — 71045 X-RAY EXAM CHEST 1 VIEW: CPT

## 2025-06-18 PROCEDURE — 99231 SBSQ HOSP IP/OBS SF/LOW 25: CPT | Performed by: PHYSICIAN ASSISTANT

## 2025-06-18 PROCEDURE — 6370000000 HC RX 637 (ALT 250 FOR IP)

## 2025-06-18 PROCEDURE — 6370000000 HC RX 637 (ALT 250 FOR IP): Performed by: STUDENT IN AN ORGANIZED HEALTH CARE EDUCATION/TRAINING PROGRAM

## 2025-06-18 PROCEDURE — 82306 VITAMIN D 25 HYDROXY: CPT

## 2025-06-18 PROCEDURE — 82010 KETONE BODYS QUAN: CPT

## 2025-06-18 PROCEDURE — 84100 ASSAY OF PHOSPHORUS: CPT

## 2025-06-18 PROCEDURE — 2000000000 HC ICU R&B

## 2025-06-18 PROCEDURE — 85027 COMPLETE CBC AUTOMATED: CPT

## 2025-06-18 PROCEDURE — 90935 HEMODIALYSIS ONE EVALUATION: CPT

## 2025-06-18 RX ORDER — MAGNESIUM SULFATE IN WATER 40 MG/ML
2000 INJECTION, SOLUTION INTRAVENOUS ONCE
Status: COMPLETED | OUTPATIENT
Start: 2025-06-18 | End: 2025-06-18

## 2025-06-18 RX ORDER — INSULIN LISPRO 100 [IU]/ML
0-4 INJECTION, SOLUTION INTRAVENOUS; SUBCUTANEOUS
Status: DISCONTINUED | OUTPATIENT
Start: 2025-06-18 | End: 2025-06-21 | Stop reason: HOSPADM

## 2025-06-18 RX ORDER — ERGOCALCIFEROL 1.25 MG/1
50000 CAPSULE, LIQUID FILLED ORAL WEEKLY
Status: DISCONTINUED | OUTPATIENT
Start: 2025-06-18 | End: 2025-06-21 | Stop reason: HOSPADM

## 2025-06-18 RX ORDER — LINEZOLID 2 MG/ML
600 INJECTION, SOLUTION INTRAVENOUS EVERY 12 HOURS
Status: DISCONTINUED | OUTPATIENT
Start: 2025-06-18 | End: 2025-06-20

## 2025-06-18 RX ORDER — CALCIUM GLUCONATE 20 MG/ML
1000 INJECTION, SOLUTION INTRAVENOUS ONCE
Status: COMPLETED | OUTPATIENT
Start: 2025-06-18 | End: 2025-06-18

## 2025-06-18 RX ORDER — INSULIN GLARGINE 100 [IU]/ML
5 INJECTION, SOLUTION SUBCUTANEOUS NIGHTLY
Status: DISCONTINUED | OUTPATIENT
Start: 2025-06-18 | End: 2025-06-21 | Stop reason: HOSPADM

## 2025-06-18 RX ADMIN — SODIUM CHLORIDE, PRESERVATIVE FREE 10 ML: 5 INJECTION INTRAVENOUS at 20:24

## 2025-06-18 RX ADMIN — PANTOPRAZOLE SODIUM 40 MG: 40 TABLET, DELAYED RELEASE ORAL at 06:21

## 2025-06-18 RX ADMIN — SODIUM CHLORIDE 2.9 UNITS/HR: 9 INJECTION, SOLUTION INTRAVENOUS at 09:48

## 2025-06-18 RX ADMIN — CALCIUM GLUCONATE 1000 MG: 20 INJECTION, SOLUTION INTRAVENOUS at 15:02

## 2025-06-18 RX ADMIN — PIPERACILLIN AND TAZOBACTAM 4500 MG: 4; .5 INJECTION, POWDER, FOR SOLUTION INTRAVENOUS at 15:29

## 2025-06-18 RX ADMIN — HYDROMORPHONE HYDROCHLORIDE 0.25 MG: 1 INJECTION, SOLUTION INTRAMUSCULAR; INTRAVENOUS; SUBCUTANEOUS at 20:51

## 2025-06-18 RX ADMIN — POTASSIUM CHLORIDE 10 MEQ: 7.46 INJECTION, SOLUTION INTRAVENOUS at 01:12

## 2025-06-18 RX ADMIN — POTASSIUM CHLORIDE 10 MEQ: 7.46 INJECTION, SOLUTION INTRAVENOUS at 04:35

## 2025-06-18 RX ADMIN — PIPERACILLIN AND TAZOBACTAM 4500 MG: 4; .5 INJECTION, POWDER, FOR SOLUTION INTRAVENOUS at 03:20

## 2025-06-18 RX ADMIN — POTASSIUM CHLORIDE 10 MEQ: 7.46 INJECTION, SOLUTION INTRAVENOUS at 03:13

## 2025-06-18 RX ADMIN — GABAPENTIN 100 MG: 100 CAPSULE ORAL at 20:24

## 2025-06-18 RX ADMIN — INSULIN GLARGINE 5 UNITS: 100 INJECTION, SOLUTION SUBCUTANEOUS at 14:28

## 2025-06-18 RX ADMIN — Medication 1 MG: at 15:15

## 2025-06-18 RX ADMIN — GABAPENTIN 100 MG: 100 CAPSULE ORAL at 08:17

## 2025-06-18 RX ADMIN — METOPROLOL TARTRATE 12.5 MG: 25 TABLET, FILM COATED ORAL at 20:24

## 2025-06-18 RX ADMIN — METOPROLOL TARTRATE 12.5 MG: 25 TABLET, FILM COATED ORAL at 15:16

## 2025-06-18 RX ADMIN — ERGOCALCIFEROL 50000 UNITS: 1.25 CAPSULE ORAL at 15:14

## 2025-06-18 RX ADMIN — MAGNESIUM SULFATE HEPTAHYDRATE 2000 MG: 40 INJECTION, SOLUTION INTRAVENOUS at 10:24

## 2025-06-18 RX ADMIN — ASPIRIN 81 MG: 81 TABLET, COATED ORAL at 20:24

## 2025-06-18 RX ADMIN — FERROUS SULFATE TAB 325 MG (65 MG ELEMENTAL FE) 325 MG: 325 (65 FE) TAB at 15:15

## 2025-06-18 RX ADMIN — SODIUM PHOSPHATE, MONOBASIC, MONOHYDRATE AND SODIUM PHOSPHATE, DIBASIC, ANHYDROUS 15 MMOL: 142; 276 INJECTION, SOLUTION INTRAVENOUS at 03:21

## 2025-06-18 RX ADMIN — DIPHENHYDRAMINE HYDROCHLORIDE 25 MG: 25 TABLET ORAL at 18:32

## 2025-06-18 RX ADMIN — ASPIRIN 81 MG: 81 TABLET, COATED ORAL at 15:15

## 2025-06-18 RX ADMIN — DEXTROSE AND SODIUM CHLORIDE: 5; .45 INJECTION, SOLUTION INTRAVENOUS at 03:05

## 2025-06-18 RX ADMIN — LINEZOLID 600 MG: 600 INJECTION, SOLUTION INTRAVENOUS at 16:18

## 2025-06-18 RX ADMIN — CYANOCOBALAMIN TAB 1000 MCG 500 MCG: 1000 TAB at 15:15

## 2025-06-18 RX ADMIN — HYDROMORPHONE HYDROCHLORIDE 0.25 MG: 1 INJECTION, SOLUTION INTRAMUSCULAR; INTRAVENOUS; SUBCUTANEOUS at 16:31

## 2025-06-18 ASSESSMENT — PAIN DESCRIPTION - LOCATION
LOCATION: KNEE

## 2025-06-18 ASSESSMENT — PAIN DESCRIPTION - DESCRIPTORS
DESCRIPTORS: SHARP
DESCRIPTORS: ACHING;TENDER;DISCOMFORT

## 2025-06-18 ASSESSMENT — PAIN SCALES - GENERAL
PAINLEVEL_OUTOF10: 8
PAINLEVEL_OUTOF10: 2

## 2025-06-18 ASSESSMENT — PAIN DESCRIPTION - ORIENTATION
ORIENTATION: LEFT

## 2025-06-18 NOTE — PROGRESS NOTES
[facility] hospitalist  Hospitalist Progress Note      SYNOPSIS: Patient admitted on 2025 for abdominal pain and hyperglycemia.  Apparently missed dialysis for 1 session.  Prior history of pancreatitis.  Sugars apparently running very high.  In the emergency room patient was noted to be in DKA.  Admitted to the ICU for DKA.  DKA protocol was started.      SUBJECTIVE:    Patient seen and examined  Records reviewed.     Seen earlier today complaining of abdominal discomfort generalized    Stable overnight. No other overnight issues reported.   Temp (24hrs), Av.1 °F (36.7 °C), Min:97.2 °F (36.2 °C), Max:98.7 °F (37.1 °C)    DIET: ADULT DIET; Regular; 4 carb choices (60 gm/meal); Low Potassium (Less than 3000 mg/day); Low Phosphorus (Less than 1000 mg)  Diet NPO  CODE: Full Code    Intake/Output Summary (Last 24 hours) at 2025 1601  Last data filed at 2025 1454  Gross per 24 hour   Intake 5936.37 ml   Output 2600 ml   Net 3336.37 ml       OBJECTIVE:    BP (!) 174/85   Pulse (!) 102   Temp 98.4 °F (36.9 °C)   Resp 15   Wt 72.3 kg (159 lb 6.3 oz)   SpO2 98%   BMI 32.18 kg/m²     General appearance: No apparent distress, appears stated age and cooperative.  HEENT:  Conjunctivae/corneas clear.   Neck: Supple. No jugular venous distention.   Respiratory: Clear to auscultation bilaterally, normal respiratory effort  Cardiovascular: Regular rate rhythm, normal S1-S2  Abdomen: Soft, nontender, nondistended  Musculoskeletal: No clubbing, cyanosis, no bilateral lower extremity edema. Brisk capillary refill.   Skin:  No rashes  on visible skin  Neurologic: awake, alert and following commands     ASSESSMENT:  Diabetic ketoacidosis  Underlying history of type 1 diabetes  Hypertension  Hyperkalemia  ESRD on hemodialysis  Anemia of chronic disease         PLAN:    DKA protocol  Treat pain  ICU management      DISPOSITION: Remains in ICU    Medications:  REVIEWED DAILY      Infusion Medications    insulin 0.1

## 2025-06-18 NOTE — PROGRESS NOTES
[facility]  Internal Medicine Residency Program  MICU Progress Note      Patient:  Michelle Nettles 39 y.o. female MRN: [id number] : 1985   Admitted:  2025 12:00 PM    Date of Service:  2025    Room: [address] [place] [address]-    Chief complaint: had concerns including Abdominal Pain and Hyperglycemia (Abdominal pain, hyperglycemia (578), recent dx of pancreatitis. Missed dialysis today. ).     Michelle Nettles is a 39 y.o. female with PMH of type 1 diabetes mellitus, esrd on hemodialysis, hypertension, is currently being managed in the MICU for DKA    Subjective     Hospital Day: 2  ICU LOS: 15h    Today's course:  The patient was seen and evaluated at bedside. Patient appears fatigued, not in distress, Aox3. SOB is improving, abdominal discomfort present. Patient reports left knee pain which appears slightly more swollen on physical exam.       Objective     Vitals  Range   BP (!) 143/63 BP  Min: 110/78  Max: 201/97   HR (!) 103 Pulse  Av.3  Min: 93  Max: 106   RR 27 Resp  Av.6  Min: 15  Max: 27   Temp. 98.4 °F (36.9 °C) Temp  Min: 97.2 °F (36.2 °C)  Max: 98.7 °F (37.1 °C)   O2sat 100 % SpO2  Av.7 %  Min: 96 %  Max: 100 %   Weight 72.3 kg (159 lb 6.3 oz)        Intake/Output Summary (Last 24 hours) at 2025 08  Last data filed at 2025 0000  Gross per 24 hour   Intake 900 ml   Output 2600 ml   Net -1700 ml     Net IO Since Admission: -1,700 mL [25]    PHYSICAL EXAM:  Physical Exam  Constitutional:       Appearance: She is obese. She is not ill-appearing.   HENT:      Head: Normocephalic.      Nose: Nose normal.   Eyes:      Pupils: Pupils are equal, round, and reactive to light.   Cardiovascular:      Rate and Rhythm: Normal rate and regular rhythm.      Pulses: Normal pulses.      Heart sounds: Normal heart sounds.   Pulmonary:      Effort: Pulmonary effort is normal.      Breath sounds: Wheezing present.      Comments: 4L O2 via nasal

## 2025-06-18 NOTE — PROGRESS NOTES
Vascular Surgery Progress Note    Pt is being seen in f/u today regarding dialysis access    Subjective  Pt s/e in MICU. Dialysis treatment last night completed with prolonged bleeding noted.     Current Medications:    insulin 2.9 Units/hr (06/18/25 0948)    sodium chloride Stopped (06/17/25 2042)    dextrose 5 % and 0.45 % NaCl 150 mL/hr at 06/18/25 0305    sodium chloride      sodium chloride      sodium chloride        dextrose bolus **OR** dextrose bolus, potassium chloride, magnesium sulfate, sodium phosphate 15 mmol in sodium chloride 0.9 % 250 mL IVPB, dextrose 5 % and 0.45 % NaCl, sodium chloride flush, sodium chloride, ondansetron **OR** ondansetron, polyethylene glycol, acetaminophen **OR** acetaminophen, albuterol, bisacodyl, diphenhydrAMINE, sodium chloride flush, sodium chloride, white petrolatum, sodium chloride, labetalol **OR** hydrALAZINE    magnesium sulfate  2,000 mg IntraVENous Once    sodium chloride flush  5-40 mL IntraVENous 2 times per day    aspirin  81 mg Oral BID    Virt-Caps  1 mg Oral Daily    docusate sodium  100 mg Oral Daily    ferrous sulfate  325 mg Oral Every Other Day    gabapentin  100 mg Oral BID    lidocaine  1 patch TransDERmal Daily    metoprolol tartrate  12.5 mg Oral BID    [Held by provider] midodrine  2.5 mg Oral BID WC    pantoprazole  40 mg Oral QAM AC    vitamin B-12  500 mcg Oral Daily    sodium chloride flush  5-40 mL IntraVENous 2 times per day    piperacillin-tazobactam  4,500 mg IntraVENous Q12H      PHYSICAL EXAM:    /76   Pulse (!) 105   Temp 98.4 °F (36.9 °C)   Resp 19   Wt 72.3 kg (159 lb 6.3 oz)   SpO2 98%   BMI 32.18 kg/m²     Intake/Output Summary (Last 24 hours) at 6/18/2025 1054  Last data filed at 6/18/2025 0000  Gross per 24 hour   Intake 900 ml   Output 2600 ml   Net -1700 ml        Gen awake, alert, minimally conversive  CVS RRR  Resp nonlabored  LUE: + diffuse edema. + pulsatile thrill through AVF with aneurysmal segment  R LE Minimal

## 2025-06-18 NOTE — PLAN OF CARE
Problem: Chronic Conditions and Co-morbidities  Goal: Patient's chronic conditions and co-morbidity symptoms are monitored and maintained or improved  6/17/2025 2205 by Jania Root RN  Outcome: Progressing     Problem: Discharge Planning  Goal: Discharge to home or other facility with appropriate resources  6/17/2025 2205 by Jania Root, RN  Outcome: Progressing  Flowsheets (Taken 6/17/2025 1717)  Discharge to home or other facility with appropriate resources:   Identify barriers to discharge with patient and caregiver   Arrange for needed discharge resources and transportation as appropriate   Identify discharge learning needs (meds, wound care, etc)   Arrange for interpreters to assist at discharge as needed   Refer to discharge planning if patient needs post-hospital services based on physician order or complex needs related to functional status, cognitive ability or social support system     Problem: Skin/Tissue Integrity  Goal: Skin integrity remains intact  Description: 1.  Monitor for areas of redness and/or skin breakdown  2.  Assess vascular access sites hourly  3.  Every 4-6 hours minimum:  Change oxygen saturation probe site  4.  Every 4-6 hours:  If on nasal continuous positive airway pressure, respiratory therapy assess nares and determine need for appliance change or resting period  Outcome: Progressing  Flowsheets (Taken 6/17/2025 2000)  Skin Integrity Remains Intact:   Monitor for areas of redness and/or skin breakdown   Assess vascular access sites hourly     Problem: Safety - Adult  Goal: Free from fall injury  Outcome: Progressing  Flowsheets (Taken 6/17/2025 2000)  Free From Fall Injury:   Instruct family/caregiver on patient safety   Based on caregiver fall risk screen, instruct family/caregiver to ask for assistance with transferring infant if caregiver noted to have fall risk factors

## 2025-06-18 NOTE — DIALYSIS
Vital signs stable. Tolerated fluid removal fairly well today. Slept most of treatment. 3100cc net fluid removal achieved with treatment. Vitals post Temp 97.9, /78, Hr 100, resp 15.

## 2025-06-18 NOTE — CONSULTS
Department of Internal Medicine  Nephrology Consult Note    SUBJECTIVE:  We are following this patient for end-stage renal failure .     Full consult was deferred as patient is followed longitudinally in the outpatient setting at [facility] on hemodialysis TTS.  Patient is a 39-year-old female who presented to the emergency department with complaints of just not feeling right.  She states her last dialysis treatment was on Friday for her Saturday treatment as she has her daughter who has special needs on the weekends.  She tells me she has been trying to get switched to a MWF schedule at dialysis but has not been able to as at this point.  She states she has had some problems with pancreatitis and has an upset stomach at this time.  She appears somewhat short of breath has oxygen on.  She also tells us that she has an appointment with vascular surgery for an outpatient procedure tomorrow.  Based on this we will consult vascular surgery so that they may possibly be able to have her surgery done tomorrow as an inpatient.      PROBLEM LIST:    Patient Active Problem List   Diagnosis    Poorly controlled type 1 diabetes mellitus (HCC)    Brittle diabetes (HCC)    Type 1 diabetes mellitus with ketoacidosis without coma (HCC)    Uncontrolled type 1 diabetes mellitus with hyperglycemia (HCC)    Hyperkalemia    Nasal polyp    Periorbital cellulitis of right eye    Periorbital cellulitis    Vitamin B deficiency, unspecified    Nasal congestion    Hidradenitis suppurativa    Symptomatic anemia    Normocytic anemia due to blood loss    Acute decompensated heart failure (HCC)    Gall stone    Dietary noncompliance    Metabolic encephalopathy    Hypoxia    Hypotension    History of venous thromboembolism    Chronic systolic (congestive) heart failure    MRSA colonization    ESRD on hemodialysis (HCC)    SOB (shortness of breath)    ESRD needing dialysis ([facility])    Encounter regarding vascular access for dialysis for end-stage

## 2025-06-18 NOTE — PROGRESS NOTES
Attempted ultrasounds noon today. Patient currently with dialysis until around 3:30 pm. Will try again later/when schedule permits.

## 2025-06-18 NOTE — CONSULTS
VITRECTOMY PARS PLANA REMOVE PRERETINAL MEMBRANE Left 8/28/2018    PARS PLANA VITRECTOMY 25 GAUGE, VITREOUS HEMORRHAGE REMOVAL, ENDO LASER, AIR/FLUID  EXCHANGE LEFT EYE performed by Pierce Fierro MD at [facility] OR    RECTAL SURGERY N/A 1/29/2021    PERINEAL ABCESS INCISION AND DRAINAGE (LITHOTOMY) performed by Dominic Brower MD at [facility] OR    UPPER GASTROINTESTINAL ENDOSCOPY N/A 2/1/2024    EGD ESOPHAGOGASTRODUODENOSCOPY performed by Shira Parker MD at [facility] ENDOSCOPY    UPPER GASTROINTESTINAL ENDOSCOPY N/A 7/18/2024    ESOPHAGOGASTRODUODENOSCOPY BIOPSY performed by Balaji Gonzalez MD at [facility] ENDOSCOPY    UPPER GASTROINTESTINAL ENDOSCOPY N/A 1/13/2025    ESOPHAGOGASTRODUODENOSCOPY BIOPSY performed by Balaji Gonzalez MD at [facility] ENDOSCOPY    UPPER GASTROINTESTINAL ENDOSCOPY N/A 3/5/2025    ESOPHAGOGASTRODUODENOSCOPY performed by Cornelius Owen MD at [facility] ENDOSCOPY     Medications Prior to Admission:    Prior to Admission medications    Medication Sig Start Date End Date Taking? Authorizing Provider   diphenhydrAMINE (BENADRYL) 25 MG tablet Take 1 tablet by mouth every 6 hours as needed for Itching 6/5/25   Cassie Perrin MD   insulin glargine (LANTUS) 100 UNIT/ML injection vial Inject 5 Units into the skin every morning 6/5/25   Cassie Perrin MD   insulin lispro (HUMALOG,ADMELOG) 100 UNIT/ML SOLN injection vial Inject 2 Units into the skin 3 times daily (with meals) 2 units along with the following sliding scale Glucose: Dose:  No Insulin 180-249 2 Units 250-299 4 Units 300-349 6 Units Over 349 8 Units and notify physician 6/5/25   Cassie Perrin MD   lidocaine 4 % external patch Place 1 patch onto the skin daily 6/5/25   Cassie Perrin MD   midodrine (PROAMATINE) 2.5 MG tablet Take 1 tablet by mouth 2 times daily (with meals) 6/5/25   Cassie Perrin MD   metoprolol tartrate (LOPRESSOR) 25 MG tablet Take  General: No focal deficit present.         GASTROENTEROLOGY  Last BM (including prior to admit): 25  Diet:   Diet NPO Exceptions are: Sips of Water with Meds     Lines Location / date Lines Location / date   [] TLC   [] Arterial line     [] PICC   [] Hemoaccess       FLUIDS:   insulin 3.5 Units/hr (25 0814)    sodium chloride Stopped (25)    dextrose 5 % and 0.45 % NaCl 150 mL/hr at 25 0305    sodium chloride      sodium chloride      sodium chloride         Labs   Labs  Recent Labs     25  1224 25  1754 25  2356 25  0617   WBC 6.1  --   --  5.8   RBC 2.60*  --   --  2.63*   HGB 7.4* 6.7* 7.6* 7.5*   HCT 23.1* 20.5* 21.9* 22.0*   MCV 88.8  --   --  83.7   MCH 28.5  --   --  28.5   MCHC 32.0  --   --  34.1   RDW 14.7  --   --  14.6     --   --  219   MPV 9.9  --   --  9.7     Recent Labs     25  1224 25  1618 25  1622 25  1754 25  2356 25  0617   * 130*  --  133* 134* 132*   K 5.7* 4.9  --  4.5 4.0 4.4   CL 86* 86*  --  88* 94* 94*   MG 2.2  --   --  2.2 1.8 1.6   PHOS  --   --   --  4.8* 2.1* 4.2   CO2 13* 10*  --  14* 22 23   BUN 56* 59*  --  59* 15 17   CREATININE 11.3* 11.2* 11.8* 11.4* 3.6* 4.7*   ANIONGAP 30* 34*  --  31* 19* 14   GLUCOSE 739* 740*  --  559* 217* 317*   CALCIUM 8.9 8.9  --  9.0 8.3* 8.0*   BILITOT 0.3  --   --   --   --   --    ALKPHOS 153*  --   --   --   --   --    AST 11  --   --   --   --   --    ALT 5  --   --   --   --   --      Recent Labs     25  1224 25  1347 25   TROPHS 188* 178* 186*   PROBNP 32,924*  --   --      Hemoglobin A1C   Date Value Ref Range Status   2025 8.7 (H) 4.0 - 5.6 % Final      Lab Results   Component Value Date    TSH 3.71 2025      Infectious   Temperature range: Temp  Av °F (36.7 °C)  Min: 97.2 °F (36.2 °C)  Max: 98.7 °F (37.1 °C)  Recent Labs     25  1224 25  1913 25  2356 25  0617   WBC 6.1  --

## 2025-06-19 LAB
ANION GAP SERPL CALCULATED.3IONS-SCNC: 13 MMOL/L (ref 7–16)
BUN SERPL-MCNC: 8 MG/DL (ref 6–20)
CALCIUM SERPL-MCNC: 8.4 MG/DL (ref 8.6–10)
CHLORIDE SERPL-SCNC: 93 MMOL/L (ref 98–107)
CO2 SERPL-SCNC: 26 MMOL/L (ref 22–29)
CREAT SERPL-MCNC: 3.2 MG/DL (ref 0.5–1)
ERYTHROCYTE [DISTWIDTH] IN BLOOD BY AUTOMATED COUNT: 14.9 % (ref 11.5–15)
GFR, ESTIMATED: 18 ML/MIN/1.73M2
GLUCOSE BLD-MCNC: 115 MG/DL (ref 74–99)
GLUCOSE BLD-MCNC: 189 MG/DL (ref 74–99)
GLUCOSE BLD-MCNC: 378 MG/DL (ref 74–99)
GLUCOSE BLD-MCNC: 395 MG/DL (ref 74–99)
GLUCOSE SERPL-MCNC: 365 MG/DL (ref 74–99)
HCT VFR BLD AUTO: 23.6 % (ref 34–48)
HGB BLD-MCNC: 8 G/DL (ref 11.5–15.5)
INR PPP: 1.1
MAGNESIUM SERPL-MCNC: 2.1 MG/DL (ref 1.6–2.6)
MCH RBC QN AUTO: 28.8 PG (ref 26–35)
MCHC RBC AUTO-ENTMCNC: 33.9 G/DL (ref 32–34.5)
MCV RBC AUTO: 84.9 FL (ref 80–99.9)
MICROORGANISM SPEC CULT: ABNORMAL
PHOSPHATE SERPL-MCNC: 3.6 MG/DL (ref 2.5–4.5)
PLATELET # BLD AUTO: 204 K/UL (ref 130–450)
PMV BLD AUTO: 9.6 FL (ref 7–12)
POTASSIUM SERPL-SCNC: 3.6 MMOL/L (ref 3.5–5.1)
PROTHROMBIN TIME: 11.6 SEC (ref 9.3–12.4)
RBC # BLD AUTO: 2.78 M/UL (ref 3.5–5.5)
SODIUM SERPL-SCNC: 132 MMOL/L (ref 136–145)
SPECIMEN DESCRIPTION: ABNORMAL
WBC OTHER # BLD: 6.1 K/UL (ref 4.5–11.5)

## 2025-06-19 PROCEDURE — 6360000002 HC RX W HCPCS

## 2025-06-19 PROCEDURE — 76937 US GUIDE VASCULAR ACCESS: CPT | Performed by: SURGERY

## 2025-06-19 PROCEDURE — 80048 BASIC METABOLIC PNL TOTAL CA: CPT

## 2025-06-19 PROCEDURE — 2580000003 HC RX 258

## 2025-06-19 PROCEDURE — 2060000000 HC ICU INTERMEDIATE R&B

## 2025-06-19 PROCEDURE — 85610 PROTHROMBIN TIME: CPT

## 2025-06-19 PROCEDURE — 6360000002 HC RX W HCPCS: Performed by: INTERNAL MEDICINE

## 2025-06-19 PROCEDURE — 36901 INTRO CATH DIALYSIS CIRCUIT: CPT | Performed by: SURGERY

## 2025-06-19 PROCEDURE — 85027 COMPLETE CBC AUTOMATED: CPT

## 2025-06-19 PROCEDURE — C1894 INTRO/SHEATH, NON-LASER: HCPCS | Performed by: SURGERY

## 2025-06-19 PROCEDURE — 2700000000 HC OXYGEN THERAPY PER DAY

## 2025-06-19 PROCEDURE — 36415 COLL VENOUS BLD VENIPUNCTURE: CPT

## 2025-06-19 PROCEDURE — 6360000002 HC RX W HCPCS: Performed by: SURGERY

## 2025-06-19 PROCEDURE — 6370000000 HC RX 637 (ALT 250 FOR IP): Performed by: INTERNAL MEDICINE

## 2025-06-19 PROCEDURE — 83735 ASSAY OF MAGNESIUM: CPT

## 2025-06-19 PROCEDURE — 36907 BALO ANGIOP CTR DIALYSIS SEG: CPT | Performed by: SURGERY

## 2025-06-19 PROCEDURE — 2500000003 HC RX 250 WO HCPCS: Performed by: INTERNAL MEDICINE

## 2025-06-19 PROCEDURE — C2623 CATH, TRANSLUMIN, DRUG-COAT: HCPCS | Performed by: SURGERY

## 2025-06-19 PROCEDURE — 6360000004 HC RX CONTRAST MEDICATION: Performed by: SURGERY

## 2025-06-19 PROCEDURE — 84100 ASSAY OF PHOSPHORUS: CPT

## 2025-06-19 PROCEDURE — 82962 GLUCOSE BLOOD TEST: CPT

## 2025-06-19 PROCEDURE — C1725 CATH, TRANSLUMIN NON-LASER: HCPCS | Performed by: SURGERY

## 2025-06-19 PROCEDURE — C1769 GUIDE WIRE: HCPCS | Performed by: SURGERY

## 2025-06-19 PROCEDURE — 6370000000 HC RX 637 (ALT 250 FOR IP): Performed by: NURSE PRACTITIONER

## 2025-06-19 PROCEDURE — B51W1ZZ FLUOROSCOPY OF DIALYSIS SHUNT/FISTULA USING LOW OSMOLAR CONTRAST: ICD-10-PCS | Performed by: SURGERY

## 2025-06-19 PROCEDURE — 2709999900 HC NON-CHARGEABLE SUPPLY: Performed by: SURGERY

## 2025-06-19 RX ORDER — HYDROCODONE BITARTRATE AND ACETAMINOPHEN 5; 325 MG/1; MG/1
1 TABLET ORAL EVERY 6 HOURS PRN
Status: DISCONTINUED | OUTPATIENT
Start: 2025-06-19 | End: 2025-06-21 | Stop reason: HOSPADM

## 2025-06-19 RX ORDER — IOPAMIDOL 612 MG/ML
INJECTION, SOLUTION INTRAVASCULAR PRN
Status: DISCONTINUED | OUTPATIENT
Start: 2025-06-19 | End: 2025-06-19 | Stop reason: HOSPADM

## 2025-06-19 RX ORDER — CLOPIDOGREL BISULFATE 75 MG/1
75 TABLET ORAL DAILY
Status: DISCONTINUED | OUTPATIENT
Start: 2025-06-19 | End: 2025-06-21 | Stop reason: HOSPADM

## 2025-06-19 RX ADMIN — SODIUM CHLORIDE, PRESERVATIVE FREE 10 ML: 5 INJECTION INTRAVENOUS at 10:13

## 2025-06-19 RX ADMIN — CLOPIDOGREL BISULFATE 75 MG: 75 TABLET, FILM COATED ORAL at 10:23

## 2025-06-19 RX ADMIN — INSULIN GLARGINE 5 UNITS: 100 INJECTION, SOLUTION SUBCUTANEOUS at 21:15

## 2025-06-19 RX ADMIN — CYANOCOBALAMIN TAB 1000 MCG 500 MCG: 1000 TAB at 10:11

## 2025-06-19 RX ADMIN — INSULIN LISPRO 1 UNITS: 100 INJECTION, SOLUTION INTRAVENOUS; SUBCUTANEOUS at 12:22

## 2025-06-19 RX ADMIN — HYDROCODONE BITARTRATE AND ACETAMINOPHEN 1 TABLET: 5; 325 TABLET ORAL at 21:13

## 2025-06-19 RX ADMIN — HYDROMORPHONE HYDROCHLORIDE 0.25 MG: 1 INJECTION, SOLUTION INTRAMUSCULAR; INTRAVENOUS; SUBCUTANEOUS at 05:42

## 2025-06-19 RX ADMIN — SODIUM CHLORIDE, PRESERVATIVE FREE 10 ML: 5 INJECTION INTRAVENOUS at 21:18

## 2025-06-19 RX ADMIN — INSULIN LISPRO 4 UNITS: 100 INJECTION, SOLUTION INTRAVENOUS; SUBCUTANEOUS at 06:18

## 2025-06-19 RX ADMIN — HYDROCODONE BITARTRATE AND ACETAMINOPHEN 1 TABLET: 5; 325 TABLET ORAL at 12:21

## 2025-06-19 RX ADMIN — METOPROLOL TARTRATE 12.5 MG: 25 TABLET, FILM COATED ORAL at 21:12

## 2025-06-19 RX ADMIN — PANTOPRAZOLE SODIUM 40 MG: 40 TABLET, DELAYED RELEASE ORAL at 05:42

## 2025-06-19 RX ADMIN — Medication 1 MG: at 13:00

## 2025-06-19 RX ADMIN — GABAPENTIN 100 MG: 100 CAPSULE ORAL at 21:13

## 2025-06-19 RX ADMIN — PIPERACILLIN AND TAZOBACTAM 4500 MG: 4; .5 INJECTION, POWDER, FOR SOLUTION INTRAVENOUS at 14:59

## 2025-06-19 RX ADMIN — ONDANSETRON 4 MG: 2 INJECTION, SOLUTION INTRAMUSCULAR; INTRAVENOUS at 03:12

## 2025-06-19 RX ADMIN — LINEZOLID 600 MG: 600 INJECTION, SOLUTION INTRAVENOUS at 03:10

## 2025-06-19 RX ADMIN — PIPERACILLIN AND TAZOBACTAM 4500 MG: 4; .5 INJECTION, POWDER, FOR SOLUTION INTRAVENOUS at 02:54

## 2025-06-19 RX ADMIN — GABAPENTIN 100 MG: 100 CAPSULE ORAL at 10:10

## 2025-06-19 RX ADMIN — ASPIRIN 81 MG: 81 TABLET, COATED ORAL at 21:12

## 2025-06-19 RX ADMIN — METOPROLOL TARTRATE 12.5 MG: 25 TABLET, FILM COATED ORAL at 10:11

## 2025-06-19 RX ADMIN — INSULIN LISPRO 4 UNITS: 100 INJECTION, SOLUTION INTRAVENOUS; SUBCUTANEOUS at 21:15

## 2025-06-19 RX ADMIN — ASPIRIN 81 MG: 81 TABLET, COATED ORAL at 10:11

## 2025-06-19 RX ADMIN — LINEZOLID 600 MG: 600 INJECTION, SOLUTION INTRAVENOUS at 16:08

## 2025-06-19 ASSESSMENT — PAIN DESCRIPTION - DESCRIPTORS
DESCRIPTORS: ACHING;SHARP;SORE
DESCRIPTORS: THROBBING;ACHING

## 2025-06-19 ASSESSMENT — PAIN SCALES - GENERAL
PAINLEVEL_OUTOF10: 0
PAINLEVEL_OUTOF10: 0
PAINLEVEL_OUTOF10: 4
PAINLEVEL_OUTOF10: 9
PAINLEVEL_OUTOF10: 0
PAINLEVEL_OUTOF10: 0
PAINLEVEL_OUTOF10: 6
PAINLEVEL_OUTOF10: 9

## 2025-06-19 ASSESSMENT — PAIN SCALES - WONG BAKER
WONGBAKER_NUMERICALRESPONSE: NO HURT
WONGBAKER_NUMERICALRESPONSE: HURTS EVEN MORE

## 2025-06-19 ASSESSMENT — PAIN DESCRIPTION - LOCATION
LOCATION: LEG
LOCATION: KNEE
LOCATION: LEG;KNEE

## 2025-06-19 ASSESSMENT — PAIN DESCRIPTION - FREQUENCY: FREQUENCY: CONTINUOUS

## 2025-06-19 ASSESSMENT — PAIN DESCRIPTION - ONSET: ONSET: ON-GOING

## 2025-06-19 ASSESSMENT — PAIN DESCRIPTION - ORIENTATION
ORIENTATION: LEFT
ORIENTATION: LEFT

## 2025-06-19 ASSESSMENT — PAIN DESCRIPTION - PAIN TYPE: TYPE: CHRONIC PAIN

## 2025-06-19 NOTE — CARE COORDINATION
Case Management Assessment  Initial Evaluation    Date/Time of Evaluation: 6/19/2025 8:10 AM  Assessment Completed by: Nunu Bingham RN    If patient is discharged prior to next notation, then this note serves as note for discharge by case management.    Patient Name: Michelle Nettles                   YOB: 1985  Diagnosis: End stage renal disease (HCC) [N18.6]  Epigastric pain [R10.13]  CKD (chronic kidney disease) [N18.9]  ESRD on dialysis (HCC) [N18.6, Z99.2]  Diabetic ketoacidosis without coma associated with type 1 diabetes mellitus (HCC) [E10.10]  Diabetic ketoacidosis without coma associated with type 2 diabetes mellitus (HCC) [E11.10]                   Date / Time: 6/17/2025 12:00 PM    Patient Admission Status: Inpatient   Readmission Risk (Low < 19, Mod (19-27), High > 27): Readmission Risk Score: 46.5    Current PCP: Rogers Rodríguez MD  PCP verified by CM? Yes    Chart Reviewed: Yes      History Provided by: Medical Record  Patient Orientation: Alert and Oriented    Patient Cognition: Alert    Hospitalization in the last 30 days (Readmission):  Yes    If yes, Readmission Assessment in CM Navigator will be completed.    Advance Directives:      Code Status: Full Code   Patient's Primary Decision Maker is: Named in Scanned ACP Document    Primary Decision Maker: Cain Musa - Brother/Sister - [phone]    Secondary Decision Maker: Nohemy Musa - Brother/Sister - [phone]    Discharge Planning:    Patient lives with:   Type of Home:    Primary Care Giver: Self  Patient Support Systems include: Family Members   Current Financial resources:    Current community resources:    Current services prior to admission:              Current DME:              Type of Home Care services:       ADLS  Prior functional level: Mobility, Shopping, Housework, Cooking  Current functional level: Other (see comment) (non abulatory in ICU)    PT AM-PAC:   /24  OT AM-PAC:   /24    Family can

## 2025-06-19 NOTE — CARE COORDINATION
Care Coordination:  LOS 2 day. Pt readmission from 6/5/25. Returned to ER on 6/17/25, not feeling well , DKA.  Admitted MICU, Insulin gtt.  Pt known from prior admissions. ESRD on HD [facility]. Pt lives alone. She has a disabled daughter who lives with a caregiver. [facility] and services were to be resumed last admission, but then was switched to [facility]- unclear on this.  She has 02 5 ltr through [facility], she follows with Dr Rodríguez and Dr Dyer. Uses SQ insulin, Dex com CGM. Used ambulette in past for transport but can also call provide a ride . Currently on Zyvox and zosyn. Insulin was bridged last evening and pt transfer to step down. Vasc following regarding amr swelling and access function. Plan for  L UE fistulogram today. Underwent HD 6/19/25. Will need to review transition of care needs.    Electronically signed by Nunu Bingham RN on 6/19/2025 at 8:08 AM

## 2025-06-19 NOTE — PLAN OF CARE
Problem: Chronic Conditions and Co-morbidities  Goal: Patient's chronic conditions and co-morbidity symptoms are monitored and maintained or improved  Outcome: Progressing     Problem: Discharge Planning  Goal: Discharge to home or other facility with appropriate resources  Outcome: Progressing     Problem: Skin/Tissue Integrity  Goal: Skin integrity remains intact  Description: 1.  Monitor for areas of redness and/or skin breakdown  2.  Assess vascular access sites hourly  3.  Every 4-6 hours minimum:  Change oxygen saturation probe site  4.  Every 4-6 hours:  If on nasal continuous positive airway pressure, respiratory therapy assess nares and determine need for appliance change or resting period  Outcome: Progressing  Flowsheets (Taken 6/19/2025 1804)  Skin Integrity Remains Intact: Monitor for areas of redness and/or skin breakdown     Problem: Safety - Adult  Goal: Free from fall injury  Outcome: Progressing     Problem: Pain  Goal: Verbalizes/displays adequate comfort level or baseline comfort level  Outcome: Progressing     Problem: ABCDS Injury Assessment  Goal: Absence of physical injury  Outcome: Progressing     Problem: Neurosensory - Adult  Goal: Achieves stable or improved neurological status  Outcome: Progressing  Goal: Absence of seizures  Outcome: Progressing  Goal: Achieves maximal functionality and self care  Outcome: Progressing     Problem: Respiratory - Adult  Goal: Achieves optimal ventilation and oxygenation  Outcome: Progressing     Problem: Cardiovascular - Adult  Goal: Maintains optimal cardiac output and hemodynamic stability  Outcome: Progressing  Goal: Absence of cardiac dysrhythmias or at baseline  Outcome: Progressing     Problem: Skin/Tissue Integrity - Adult  Goal: Skin integrity remains intact  Description: 1.  Monitor for areas of redness and/or skin breakdown  2.  Assess vascular access sites hourly  3.  Every 4-6 hours minimum:  Change oxygen saturation probe site  4.  Every

## 2025-06-19 NOTE — ACP (ADVANCE CARE PLANNING)
Advance Care Planning   Healthcare Decision Maker:    Primary Decision Maker: Cain Musa-PRIMARY ON POA - Brother/Sister - [phone]    Secondary Decision Maker: Nohemy Musa-2ndary ON POA - Brother/Sister - [phone]    Click here to complete Healthcare Decision Makers including selection of the Healthcare Decision Maker Relationship (ie \"Primary\").  Today we documented Decision Maker(s) consistent with ACP documents on file.

## 2025-06-19 NOTE — CARE COORDINATION
6/19/25 Update CM Note. Pt has daughter that with disabilities.  She has a caregiver from [facility].  Caregiver is to drop daughter off to patients home this evening.  D/t pt hospitalization no one will be there to care for the child.  Pt has been calling her SSA (Service and Support ) without response. Call to caregiver Kimberly, she will contact pt.  Message left for and emails sent to SSA-Francisca and [facility].  Email received from supervisor and asked to call SSA on call.  Call placed and requested info provided. Pt also given # to call.  Aliya FRANCISCO RN-BC  [phone]

## 2025-06-19 NOTE — PROGRESS NOTES
Vascular Surgery Progress Note    Pt is being seen in f/u today regarding dialysis access    Subjective  Pt s/e. Now out of the ICU.  Plan for fistulogram today.    Current Medications:    dextrose 5 % and 0.45 % NaCl Stopped (06/18/25 1525)    sodium chloride        dextrose bolus **OR** dextrose bolus, dextrose 5 % and 0.45 % NaCl, sodium chloride, ondansetron **OR** ondansetron, polyethylene glycol, acetaminophen **OR** acetaminophen, albuterol, bisacodyl, diphenhydrAMINE, sodium chloride flush, white petrolatum, labetalol **OR** hydrALAZINE    vitamin D  50,000 Units Oral Weekly    insulin glargine  5 Units SubCUTAneous Nightly    insulin lispro  0-4 Units SubCUTAneous 4x Daily AC & HS    linezolid  600 mg IntraVENous Q12H    sodium chloride flush  5-40 mL IntraVENous 2 times per day    aspirin  81 mg Oral BID    Virt-Caps  1 mg Oral Daily    docusate sodium  100 mg Oral Daily    ferrous sulfate  325 mg Oral Every Other Day    gabapentin  100 mg Oral BID    lidocaine  1 patch TransDERmal Daily    metoprolol tartrate  12.5 mg Oral BID    [Held by provider] midodrine  2.5 mg Oral BID WC    pantoprazole  40 mg Oral QAM AC    vitamin B-12  500 mcg Oral Daily    piperacillin-tazobactam  4,500 mg IntraVENous Q12H      PHYSICAL EXAM:    BP (!) 142/72   Pulse (!) 101   Temp 98.7 °F (37.1 °C) (Temporal)   Resp 18   Ht 1.499 m (4' 11\")   Wt 64.9 kg (143 lb 1.6 oz)   SpO2 100%   BMI 28.90 kg/m²     Intake/Output Summary (Last 24 hours) at 6/19/2025 0808  Last data filed at 6/19/2025 0000  Gross per 24 hour   Intake 5768.07 ml   Output 0 ml   Net 5768.07 ml        Gen awake, alert, minimally conversive  CVS RRR  Resp nonlabored  LUE: + diffuse edema. + pulsatile thrill through AVF with aneurysmal segment  R LE Minimal edema  L LE Minimal edema    LABS:    Lab Results   Component Value Date    WBC 6.1 06/19/2025    HGB 8.0 (L) 06/19/2025    HCT 23.6 (L) 06/19/2025     06/19/2025    PROTIME 11.6 06/19/2025

## 2025-06-19 NOTE — PROGRESS NOTES
Pt glucose  is 378, gave 4 units per sliding scale.  Messaged physician through PS for any additional  dosage.

## 2025-06-19 NOTE — PROGRESS NOTES
[facility]  Nephrology Progress Note    Patient's Name: Michelle Nettles     History OF Present Illness From 6/18 Consult Note:  \"We are following this patient for end-stage renal failure .   Full consult was deferred as patient is followed longitudinally in the outpatient setting at [facility] on hemodialysis TTS.  Patient is a 39-year-old female who presented to the emergency department with complaints of just not feeling right.  She states her last dialysis treatment was on Friday for her Saturday treatment as she has her daughter who has special needs on the weekends.  She tells me she has been trying to get switched to a MWF schedule at dialysis but has not been able to as at this point.  She states she has had some problems with pancreatitis and has an upset stomach at this time.  She appears somewhat short of breath has oxygen on.  She also tells us that she has an appointment with vascular surgery for an outpatient procedure tomorrow.  Based on this we will consult vascular surgery so that they may possibly be able to have her surgery done tomorrow as an inpatient.\"    Subjective:      6/19/2025: Patient seen and examined. She reports feeling pretty good today. Today [birth year] birthday! She denies any pain. She reports that her breathing is okay.     Problem List:    Patient Active Problem List   Diagnosis    Poorly controlled type 1 diabetes mellitus (HCC)    Brittle diabetes (HCC)    Type 1 diabetes mellitus with ketoacidosis without coma (HCC)    Uncontrolled type 1 diabetes mellitus with hyperglycemia (HCC)    Hyperkalemia    Nasal polyp    Periorbital cellulitis of right eye    Periorbital cellulitis    Vitamin B deficiency, unspecified    Nasal congestion    Hidradenitis suppurativa    Symptomatic anemia    Normocytic anemia due to blood loss    Acute decompensated heart failure (HCC)    Gall stone    Dietary noncompliance    Metabolic encephalopathy    Hypoxia    Hypotension    History

## 2025-06-19 NOTE — PROGRESS NOTES
Report called to [facility]. Transport Requested. The following patient belongings: Wallet, Purse, Cell Phone, Cell Phone , Pants, Shirt, Socks, and Shoes were gathered and remain with the patient on transfer to their new room. Family notified no per patient she would call and inform them in the morning.

## 2025-06-19 NOTE — PLAN OF CARE
Problem: Chronic Conditions and Co-morbidities  Goal: Patient's chronic conditions and co-morbidity symptoms are monitored and maintained or improved  Outcome: Progressing  Flowsheets (Taken 6/18/2025 2000)  Care Plan - Patient's Chronic Conditions and Co-Morbidity Symptoms are Monitored and Maintained or Improved:   Monitor and assess patient's chronic conditions and comorbid symptoms for stability, deterioration, or improvement   Update acute care plan with appropriate goals if chronic or comorbid symptoms are exacerbated and prevent overall improvement and discharge   Collaborate with multidisciplinary team to address chronic and comorbid conditions and prevent exacerbation or deterioration     Problem: Discharge Planning  Goal: Discharge to home or other facility with appropriate resources  Outcome: Progressing  Flowsheets (Taken 6/18/2025 2000)  Discharge to home or other facility with appropriate resources:   Identify discharge learning needs (meds, wound care, etc)   Identify barriers to discharge with patient and caregiver   Arrange for needed discharge resources and transportation as appropriate   Refer to discharge planning if patient needs post-hospital services based on physician order or complex needs related to functional status, cognitive ability or social support system     Problem: Skin/Tissue Integrity  Goal: Skin integrity remains intact  Description: 1.  Monitor for areas of redness and/or skin breakdown  2.  Assess vascular access sites hourly  3.  Every 4-6 hours minimum:  Change oxygen saturation probe site  4.  Every 4-6 hours:  If on nasal continuous positive airway pressure, respiratory therapy assess nares and determine need for appliance change or resting period  Outcome: Progressing  Flowsheets (Taken 6/18/2025 2000)  Skin Integrity Remains Intact:   Monitor for areas of redness and/or skin breakdown   Assess vascular access sites hourly     Problem: Safety - Adult  Goal: Free from

## 2025-06-19 NOTE — PROGRESS NOTES
[facility]  PULMONARY/CRITICAL CARE PROGRESS NOTE    Patient: Michelle Nettles  MRN: [id number]  : 1985    Encounter Date: 2025  Encounter Time: 1:37 PM     Date of Admission: .2025 12:00 PM    Consulting Physician:  Primary Care Physician:     Rogers Rodríguez MD     [phone]    Reason for Consultation: Hypoxia     Problem List:  Patient Active Problem List   Diagnosis    Poorly controlled type 1 diabetes mellitus (HCC)    Brittle diabetes (HCC)    Type 1 diabetes mellitus with ketoacidosis without coma (HCC)    Uncontrolled type 1 diabetes mellitus with hyperglycemia (HCC)    Hyperkalemia    Nasal polyp    Periorbital cellulitis of right eye    Periorbital cellulitis    Vitamin B deficiency, unspecified    Nasal congestion    Hidradenitis suppurativa    Symptomatic anemia    Normocytic anemia due to blood loss    Acute decompensated heart failure (HCC)    Gall stone    Dietary noncompliance    Metabolic encephalopathy    Hypoxia    Hypotension    History of venous thromboembolism    Chronic systolic (congestive) heart failure    MRSA colonization    ESRD on hemodialysis (HCC)    SOB (shortness of breath)    ESRD needing dialysis (HCC)    Encounter regarding vascular access for dialysis for end-stage renal disease (HCC)    Chronic hypoxic respiratory failure, on home oxygen therapy (HCC)    Acute on chronic anemia    Hypercalcemia    GI bleed    Hypoglycemia    Hypervolemia    Anemia    Acute superficial gastritis without hemorrhage    Schatzki's ring of distal esophagus    Closed trimalleolar fracture of left ankle    Syncope and collapse    Oxygen dependent    Trimalleolar fracture of ankle, closed, left, initial encounter    Presence of artificial lower extremity    Anemia of chronic disease    Diverticulosis large intestine w/o perforation or abscess w/o bleeding    Other hemorrhoids    Atypical chest pain    Chronic heart failure with preserved  06/19/2025    LYMPHOPCT 10 (L) 06/17/2025    RBC 2.78 (L) 06/19/2025    MCH 28.8 06/19/2025    MCHC 33.9 06/19/2025    RDW 14.9 06/19/2025    NEUTOPHILPCT 84 (H) 06/17/2025    MONOPCT 4 06/17/2025    EOSPCT 2 06/17/2025    BASOPCT 0 06/17/2025    NEUTROABS 5.12 06/17/2025    LYMPHSABS 0.59 (L) 06/17/2025    MONOSABS 0.24 06/17/2025    EOSABS 0.13 06/17/2025    BASOSABS 0.02 06/17/2025       Recent Labs     06/19/25  0640 06/18/25  0617 06/17/25  2356 06/17/25  1754 06/17/25  1224   WBC 6.1 5.8  --   --  6.1   HGB 8.0* 7.5* 7.6*   < > 7.4*   HCT 23.6* 22.0* 21.9*   < > 23.1*   MCV 84.9 83.7  --   --  88.8    219  --   --  238    < > = values in this interval not displayed.       BMP:   Recent Labs     06/18/25  0617 06/18/25  0915 06/19/25  0640   * 132* 132*   K 4.4 4.1 3.6   CL 94* 95* 93*   CO2 23 24 26   PHOS 4.2 4.3 3.6   BUN 17 17 8   CREATININE 4.7* 4.9* 3.2*       MG:   Lab Results   Component Value Date/Time    MG 2.1 06/19/2025 06:40 AM     Ca/Phos:   Lab Results   Component Value Date    CALCIUM 8.4 (L) 06/19/2025    PHOS 3.6 06/19/2025     Amylase:   Lab Results   Component Value Date/Time    AMYLASE 83 11/26/2018 10:42 AM     Lipase:   Lab Results   Component Value Date    LIPASE 34 06/17/2025     LIVER PROFILE:   Recent Labs     06/17/25  1224   AST 11   ALT 5   LIPASE 34   BILITOT 0.3   ALKPHOS 153*       PT/INR:   Recent Labs     06/19/25  0640   PROTIME 11.6   INR 1.1     APTT: No results for input(s): \"APTT\" in the last 72 hours.    Cardiac Enzymes:  Lab Results   Component Value Date    CKTOTAL 244 (H) 04/01/2018    CKMB 1.8 04/01/2018    TROPONINI 0.13 (H) 01/27/2021       Hgb A1C:   Lab Results   Component Value Date    LABA1C 8.7 (H) 06/17/2025     No results found for: \"EAG\"  FLORY:   Lab Results   Component Value Date    FLORY POSITIVE (A) 04/06/2016     ESR:   Lab Results   Component Value Date    SEDRATE 80 (H) 06/17/2025     CRP:   Lab Results   Component Value Date    CRP 25.9 (H)

## 2025-06-20 ENCOUNTER — APPOINTMENT (OUTPATIENT)
Dept: ULTRASOUND IMAGING | Age: 40
End: 2025-06-20
Payer: MEDICARE

## 2025-06-20 LAB
ANION GAP SERPL CALCULATED.3IONS-SCNC: 13 MMOL/L (ref 7–16)
BUN SERPL-MCNC: 15 MG/DL (ref 6–20)
CALCIUM SERPL-MCNC: 8.4 MG/DL (ref 8.6–10)
CHLORIDE SERPL-SCNC: 93 MMOL/L (ref 98–107)
CO2 SERPL-SCNC: 24 MMOL/L (ref 22–29)
CREAT SERPL-MCNC: 5.5 MG/DL (ref 0.5–1)
ERYTHROCYTE [DISTWIDTH] IN BLOOD BY AUTOMATED COUNT: 14.9 % (ref 11.5–15)
GFR, ESTIMATED: 9 ML/MIN/1.73M2
GLUCOSE BLD-MCNC: 182 MG/DL (ref 74–99)
GLUCOSE BLD-MCNC: 253 MG/DL (ref 74–99)
GLUCOSE BLD-MCNC: 360 MG/DL (ref 74–99)
GLUCOSE BLD-MCNC: 81 MG/DL (ref 74–99)
GLUCOSE SERPL-MCNC: 358 MG/DL (ref 74–99)
HCT VFR BLD AUTO: 23.4 % (ref 34–48)
HGB BLD-MCNC: 7.7 G/DL (ref 11.5–15.5)
MAGNESIUM SERPL-MCNC: 2.1 MG/DL (ref 1.6–2.6)
MCH RBC QN AUTO: 28.4 PG (ref 26–35)
MCHC RBC AUTO-ENTMCNC: 32.9 G/DL (ref 32–34.5)
MCV RBC AUTO: 86.3 FL (ref 80–99.9)
PHOSPHATE SERPL-MCNC: 4.2 MG/DL (ref 2.5–4.5)
PLATELET # BLD AUTO: 194 K/UL (ref 130–450)
PMV BLD AUTO: 10.3 FL (ref 7–12)
POTASSIUM SERPL-SCNC: 3.7 MMOL/L (ref 3.5–5.1)
RBC # BLD AUTO: 2.71 M/UL (ref 3.5–5.5)
SODIUM SERPL-SCNC: 131 MMOL/L (ref 136–145)
WBC OTHER # BLD: 4.2 K/UL (ref 4.5–11.5)

## 2025-06-20 PROCEDURE — 6370000000 HC RX 637 (ALT 250 FOR IP): Performed by: NURSE PRACTITIONER

## 2025-06-20 PROCEDURE — 6370000000 HC RX 637 (ALT 250 FOR IP): Performed by: INTERNAL MEDICINE

## 2025-06-20 PROCEDURE — 93970 EXTREMITY STUDY: CPT

## 2025-06-20 PROCEDURE — 90935 HEMODIALYSIS ONE EVALUATION: CPT

## 2025-06-20 PROCEDURE — 6360000002 HC RX W HCPCS

## 2025-06-20 PROCEDURE — 6360000002 HC RX W HCPCS: Performed by: INTERNAL MEDICINE

## 2025-06-20 PROCEDURE — 6370000000 HC RX 637 (ALT 250 FOR IP)

## 2025-06-20 PROCEDURE — 2060000000 HC ICU INTERMEDIATE R&B

## 2025-06-20 PROCEDURE — 85027 COMPLETE CBC AUTOMATED: CPT

## 2025-06-20 PROCEDURE — 83735 ASSAY OF MAGNESIUM: CPT

## 2025-06-20 PROCEDURE — 2580000003 HC RX 258

## 2025-06-20 PROCEDURE — 84100 ASSAY OF PHOSPHORUS: CPT

## 2025-06-20 PROCEDURE — 76705 ECHO EXAM OF ABDOMEN: CPT

## 2025-06-20 PROCEDURE — 82962 GLUCOSE BLOOD TEST: CPT

## 2025-06-20 PROCEDURE — 36415 COLL VENOUS BLD VENIPUNCTURE: CPT

## 2025-06-20 PROCEDURE — 2700000000 HC OXYGEN THERAPY PER DAY

## 2025-06-20 PROCEDURE — 6370000000 HC RX 637 (ALT 250 FOR IP): Performed by: STUDENT IN AN ORGANIZED HEALTH CARE EDUCATION/TRAINING PROGRAM

## 2025-06-20 PROCEDURE — 80048 BASIC METABOLIC PNL TOTAL CA: CPT

## 2025-06-20 PROCEDURE — 2500000003 HC RX 250 WO HCPCS: Performed by: INTERNAL MEDICINE

## 2025-06-20 RX ORDER — CLOPIDOGREL BISULFATE 75 MG/1
75 TABLET ORAL DAILY
Qty: 90 TABLET | Refills: 3 | Status: SHIPPED | OUTPATIENT
Start: 2025-06-21

## 2025-06-20 RX ORDER — GLUCAGON 1 MG/ML
1 KIT INJECTION PRN
Status: DISCONTINUED | OUTPATIENT
Start: 2025-06-20 | End: 2025-06-21 | Stop reason: HOSPADM

## 2025-06-20 RX ORDER — DEXTROSE MONOHYDRATE 100 MG/ML
INJECTION, SOLUTION INTRAVENOUS CONTINUOUS PRN
Status: DISCONTINUED | OUTPATIENT
Start: 2025-06-20 | End: 2025-06-21 | Stop reason: HOSPADM

## 2025-06-20 RX ORDER — ERGOCALCIFEROL 1.25 MG/1
50000 CAPSULE ORAL WEEKLY
Qty: 5 CAPSULE | Refills: 0 | Status: SHIPPED | OUTPATIENT
Start: 2025-06-25 | End: 2025-06-25 | Stop reason: SDUPTHER

## 2025-06-20 RX ORDER — HYDROCODONE BITARTRATE AND ACETAMINOPHEN 5; 325 MG/1; MG/1
1 TABLET ORAL EVERY 6 HOURS PRN
Qty: 7 TABLET | Refills: 0 | Status: SHIPPED | OUTPATIENT
Start: 2025-06-20 | End: 2025-06-25 | Stop reason: SDUPTHER

## 2025-06-20 RX ORDER — POTASSIUM CHLORIDE 1500 MG/1
40 TABLET, EXTENDED RELEASE ORAL ONCE
Status: COMPLETED | OUTPATIENT
Start: 2025-06-20 | End: 2025-06-20

## 2025-06-20 RX ORDER — INSULIN LISPRO 100 [IU]/ML
4 INJECTION, SOLUTION INTRAVENOUS; SUBCUTANEOUS ONCE
Status: COMPLETED | OUTPATIENT
Start: 2025-06-20 | End: 2025-06-20

## 2025-06-20 RX ADMIN — LINEZOLID 600 MG: 600 INJECTION, SOLUTION INTRAVENOUS at 04:43

## 2025-06-20 RX ADMIN — INSULIN LISPRO 2 UNITS: 100 INJECTION, SOLUTION INTRAVENOUS; SUBCUTANEOUS at 21:29

## 2025-06-20 RX ADMIN — HYDROCODONE BITARTRATE AND ACETAMINOPHEN 1 TABLET: 5; 325 TABLET ORAL at 04:46

## 2025-06-20 RX ADMIN — GABAPENTIN 100 MG: 100 CAPSULE ORAL at 10:43

## 2025-06-20 RX ADMIN — SODIUM CHLORIDE, PRESERVATIVE FREE 10 ML: 5 INJECTION INTRAVENOUS at 21:22

## 2025-06-20 RX ADMIN — DOCUSATE SODIUM 100 MG: 100 CAPSULE, LIQUID FILLED ORAL at 10:34

## 2025-06-20 RX ADMIN — INSULIN LISPRO 4 UNITS: 100 INJECTION, SOLUTION INTRAVENOUS; SUBCUTANEOUS at 18:40

## 2025-06-20 RX ADMIN — INSULIN LISPRO 4 UNITS: 100 INJECTION, SOLUTION INTRAVENOUS; SUBCUTANEOUS at 06:32

## 2025-06-20 RX ADMIN — INSULIN LISPRO 4 UNITS: 100 INJECTION, SOLUTION INTRAVENOUS; SUBCUTANEOUS at 08:45

## 2025-06-20 RX ADMIN — ONDANSETRON 4 MG: 2 INJECTION, SOLUTION INTRAMUSCULAR; INTRAVENOUS at 12:50

## 2025-06-20 RX ADMIN — HYDROCODONE BITARTRATE AND ACETAMINOPHEN 1 TABLET: 5; 325 TABLET ORAL at 21:30

## 2025-06-20 RX ADMIN — PIPERACILLIN AND TAZOBACTAM 4500 MG: 4; .5 INJECTION, POWDER, FOR SOLUTION INTRAVENOUS at 06:37

## 2025-06-20 RX ADMIN — ASPIRIN 81 MG: 81 TABLET, COATED ORAL at 21:22

## 2025-06-20 RX ADMIN — GABAPENTIN 100 MG: 100 CAPSULE ORAL at 21:22

## 2025-06-20 RX ADMIN — SODIUM CHLORIDE, PRESERVATIVE FREE 10 ML: 5 INJECTION INTRAVENOUS at 10:34

## 2025-06-20 RX ADMIN — PANTOPRAZOLE SODIUM 40 MG: 40 TABLET, DELAYED RELEASE ORAL at 10:34

## 2025-06-20 RX ADMIN — FERROUS SULFATE TAB 325 MG (65 MG ELEMENTAL FE) 325 MG: 325 (65 FE) TAB at 11:08

## 2025-06-20 RX ADMIN — METOPROLOL TARTRATE 12.5 MG: 25 TABLET, FILM COATED ORAL at 21:22

## 2025-06-20 RX ADMIN — INSULIN GLARGINE 5 UNITS: 100 INJECTION, SOLUTION SUBCUTANEOUS at 21:30

## 2025-06-20 RX ADMIN — METOPROLOL TARTRATE 12.5 MG: 25 TABLET, FILM COATED ORAL at 10:35

## 2025-06-20 RX ADMIN — Medication 1 MG: at 10:35

## 2025-06-20 RX ADMIN — POTASSIUM CHLORIDE 40 MEQ: 1500 TABLET, EXTENDED RELEASE ORAL at 10:35

## 2025-06-20 RX ADMIN — CLOPIDOGREL BISULFATE 75 MG: 75 TABLET, FILM COATED ORAL at 10:35

## 2025-06-20 RX ADMIN — CYANOCOBALAMIN TAB 1000 MCG 500 MCG: 1000 TAB at 10:35

## 2025-06-20 RX ADMIN — ASPIRIN 81 MG: 81 TABLET, COATED ORAL at 10:34

## 2025-06-20 ASSESSMENT — PAIN DESCRIPTION - LOCATION
LOCATION: KNEE
LOCATION: KNEE

## 2025-06-20 ASSESSMENT — PAIN DESCRIPTION - ORIENTATION
ORIENTATION: LEFT
ORIENTATION: LEFT

## 2025-06-20 ASSESSMENT — PAIN DESCRIPTION - DESCRIPTORS: DESCRIPTORS: ACHING;DISCOMFORT;DULL

## 2025-06-20 ASSESSMENT — PAIN SCALES - GENERAL: PAINLEVEL_OUTOF10: 7

## 2025-06-20 NOTE — PROGRESS NOTES
Received call from physician and discharge will be cancelled for today, nephrology want patient to have dialysis tomorrow. Informed physician that patient will need outpatient referral for diabetes educator.

## 2025-06-20 NOTE — PROGRESS NOTES
Vascular Surgery Progress Note    Pt is being seen in f/u today regarding dialysis access    Subjective  Pt s/e. Left arm edema almost resolved s/p fistulogram yesterday.  She is for dialysis today and potential discharge later.      Current Medications:    dextrose      dextrose 5 % and 0.45 % NaCl Stopped (06/18/25 1525)    sodium chloride        glucose, dextrose bolus **OR** dextrose bolus, glucagon (rDNA), dextrose, HYDROcodone 5 mg - acetaminophen, dextrose 5 % and 0.45 % NaCl, sodium chloride, ondansetron **OR** ondansetron, polyethylene glycol, acetaminophen **OR** acetaminophen, albuterol, bisacodyl, diphenhydrAMINE, sodium chloride flush, white petrolatum, labetalol **OR** hydrALAZINE    clopidogrel  75 mg Oral Daily    vitamin D  50,000 Units Oral Weekly    insulin glargine  5 Units SubCUTAneous Nightly    insulin lispro  0-4 Units SubCUTAneous 4x Daily AC & HS    sodium chloride flush  5-40 mL IntraVENous 2 times per day    aspirin  81 mg Oral BID    Virt-Caps  1 mg Oral Daily    docusate sodium  100 mg Oral Daily    ferrous sulfate  325 mg Oral Every Other Day    gabapentin  100 mg Oral BID    lidocaine  1 patch TransDERmal Daily    metoprolol tartrate  12.5 mg Oral BID    [Held by provider] midodrine  2.5 mg Oral BID WC    pantoprazole  40 mg Oral QAM AC    vitamin B-12  500 mcg Oral Daily      PHYSICAL EXAM:    BP (!) 131/58   Pulse 91   Temp 97 °F (36.1 °C) (Temporal)   Resp 18   Ht 1.499 m (4' 11\")   Wt 64.9 kg (143 lb 1.6 oz)   SpO2 100%   BMI 28.90 kg/m²     Intake/Output Summary (Last 24 hours) at 6/20/2025 1103  Last data filed at 6/20/2025 0808  Gross per 24 hour   Intake 1769.6 ml   Output --   Net 1769.6 ml        Gen Awake, alert, oriented x3, in no apparent distress   CVS RRR  Resp No increased work of breathing   LUE:  edema improved, AVF +thrill  R LE  No edema  L LE  No edema    LABS:    Lab Results   Component Value Date    WBC 4.2 (L) 06/20/2025    HGB 7.7 (L) 06/20/2025    HCT

## 2025-06-20 NOTE — PLAN OF CARE
Problem: Chronic Conditions and Co-morbidities  Goal: Patient's chronic conditions and co-morbidity symptoms are monitored and maintained or improved  6/20/2025 0012 by Avis Do RN  Outcome: Progressing  6/19/2025 1804 by Eileen Haney RN  Outcome: Progressing     Problem: Discharge Planning  Goal: Discharge to home or other facility with appropriate resources  6/20/2025 0012 by Avis Do RN  Outcome: Progressing  6/19/2025 1804 by Eileen Haney RN  Outcome: Progressing     Problem: Skin/Tissue Integrity  Goal: Skin integrity remains intact  Description: 1.  Monitor for areas of redness and/or skin breakdown  2.  Assess vascular access sites hourly  3.  Every 4-6 hours minimum:  Change oxygen saturation probe site  4.  Every 4-6 hours:  If on nasal continuous positive airway pressure, respiratory therapy assess nares and determine need for appliance change or resting period  6/20/2025 0012 by Avis Do RN  Outcome: Progressing  6/19/2025 1804 by Eileen Haney RN  Outcome: Progressing  Flowsheets (Taken 6/19/2025 1803)  Skin Integrity Remains Intact: Monitor for areas of redness and/or skin breakdown     Problem: Safety - Adult  Goal: Free from fall injury  6/20/2025 0012 by Avis Do RN  Outcome: Progressing  6/19/2025 1804 by Eileen Haney RN  Outcome: Progressing     Problem: Pain  Goal: Verbalizes/displays adequate comfort level or baseline comfort level  6/20/2025 0012 by Avis Do RN  Outcome: Progressing  6/19/2025 1804 by Eileen Haney RN  Outcome: Progressing     Problem: ABCDS Injury Assessment  Goal: Absence of physical injury  6/20/2025 0012 by Avis Do RN  Outcome: Progressing  6/19/2025 1804 by Eileen Haney RN  Outcome: Progressing     Problem: Neurosensory - Adult  Goal: Achieves stable or improved neurological status  6/20/2025 0012 by Avis Do RN  Outcome: Progressing  6/19/2025 1804 by Eileen Haney RN  Outcome: Progressing  Goal:

## 2025-06-20 NOTE — PROGRESS NOTES
[facility] hospitalist  Hospitalist Progress Note      SYNOPSIS: Patient admitted on 2025 for abdominal pain and hyperglycemia.  Apparently missed dialysis for 1 session.  Prior history of pancreatitis.  Sugars apparently running very high.  In the emergency room patient was noted to be in DKA.  Admitted to the ICU for DKA.  DKA protocol was started.     transferred out of ICU after DKA management.  Antibiotic discontinued, no focal abscesses.  Ultrasound gallbladder showed GB polyp 5 mm, normal common bile duct, narrowing cholelithiasis.  Scheduled for hemodialysis today.  General surgery has been consulted.  Anticipate discharge    SUBJECTIVE:    Patient seen and examined  Records reviewed.   Complaining of left knee pain  Tylenol not helping  Status post left upper extremity fistulogram  Stable overnight. No other overnight issues reported.   Temp (24hrs), Av.5 °F (36.4 °C), Min:96.8 °F (36 °C), Max:98.8 °F (37.1 °C)    DIET: ADULT DIET; Regular; 4 carb choices (60 gm/meal); Low Potassium (Less than 3000 mg/day); Low Phosphorus (Less than 1000 mg)  CODE: Full Code    Intake/Output Summary (Last 24 hours) at 2025 1338  Last data filed at 2025 0808  Gross per 24 hour   Intake 1449.6 ml   Output --   Net 1449.6 ml       OBJECTIVE:    /70   Pulse 88   Temp 97.8 °F (36.6 °C) (Temporal)   Resp 18   Ht 1.499 m (4' 11\")   Wt 64.9 kg (143 lb 1.6 oz)   SpO2 100%   BMI 28.90 kg/m²     General appearance: No apparent distress, appears stated age and cooperative.  HEENT:  Conjunctivae/corneas clear.   Neck: Supple. No jugular venous distention.   Respiratory: Clear to auscultation bilaterally, normal respiratory effort  Cardiovascular: Regular rate rhythm, normal S1-S2  Abdomen: Soft, nontender, nondistended  Musculoskeletal: No clubbing, cyanosis, no bilateral lower extremity edema. Brisk capillary refill.   Skin:  No rashes  on visible skin  Neurologic: awake, alert and following commands

## 2025-06-20 NOTE — PROGRESS NOTES
[facility]  PULMONARY/CRITICAL CARE PROGRESS NOTE    Patient: Michelle Nettles  MRN: [id number]  : 1985    Encounter Date: 2025  Encounter Time: 12:13 PM     Date of Admission: .2025 12:00 PM    Consulting Physician:  Primary Care Physician:     Rogers Rodríguez MD     [phone]    Reason for Consultation: Hypoxia     Problem List:  Patient Active Problem List   Diagnosis    Poorly controlled type 1 diabetes mellitus (HCC)    Brittle diabetes (HCC)    Type 1 diabetes mellitus with ketoacidosis without coma (HCC)    Uncontrolled type 1 diabetes mellitus with hyperglycemia (HCC)    Hyperkalemia    Nasal polyp    Periorbital cellulitis of right eye    Periorbital cellulitis    Vitamin B deficiency, unspecified    Nasal congestion    Hidradenitis suppurativa    Symptomatic anemia    Normocytic anemia due to blood loss    Acute decompensated heart failure (HCC)    Gall stone    Dietary noncompliance    Metabolic encephalopathy    Hypoxia    Hypotension    History of venous thromboembolism    Chronic systolic (congestive) heart failure    MRSA colonization    ESRD on hemodialysis (HCC)    SOB (shortness of breath)    ESRD needing dialysis (HCC)    Encounter regarding vascular access for dialysis for end-stage renal disease (HCC)    Chronic hypoxic respiratory failure, on home oxygen therapy (HCC)    Acute on chronic anemia    Hypercalcemia    GI bleed    Hypoglycemia    Hypervolemia    Anemia    Acute superficial gastritis without hemorrhage    Schatzki's ring of distal esophagus    Closed trimalleolar fracture of left ankle    Syncope and collapse    Oxygen dependent    Trimalleolar fracture of ankle, closed, left, initial encounter    Presence of artificial lower extremity    Anemia of chronic disease    Diverticulosis large intestine w/o perforation or abscess w/o bleeding    Other hemorrhoids    Atypical chest pain    Chronic heart failure with preserved  ejection fraction (HFpEF) (HCC)    VHD (valvular heart disease)    Unable to ambulate    Diabetic ketoacidosis without coma associated with type 2 diabetes mellitus (HCC)    Acute pancreatitis    History of pancreatitis    Diabetic ketoacidosis without coma associated with type 1 diabetes mellitus (HCC)    End stage renal disease (HCC)       SUBJECTIVE: Patient seen and examined.  Overnight the patient had no shortness of breath, cough, increased work of breathing.    HOME MEDICATIONS:  Prior to Admission medications    Medication Sig Start Date End Date Taking? Authorizing Provider   clopidogrel (PLAVIX) 75 MG tablet Take 1 tablet by mouth daily 6/21/25  Yes Jorge Aldana, APRN - CNP   diphenhydrAMINE (BENADRYL) 25 MG tablet Take 1 tablet by mouth every 6 hours as needed for Itching 6/5/25   Cassie Perrin MD   insulin glargine (LANTUS) 100 UNIT/ML injection vial Inject 5 Units into the skin every morning 6/5/25   Cassie Perrin MD   insulin lispro (HUMALOG,ADMELOG) 100 UNIT/ML SOLN injection vial Inject 2 Units into the skin 3 times daily (with meals) 2 units along with the following sliding scale Glucose: Dose:  No Insulin 180-249 2 Units 250-299 4 Units 300-349 6 Units Over 349 8 Units and notify physician 6/5/25   Cassie Perrin MD   lidocaine 4 % external patch Place 1 patch onto the skin daily 6/5/25   Cassie Perrin MD   midodrine (PROAMATINE) 2.5 MG tablet Take 1 tablet by mouth 2 times daily (with meals) 6/5/25   Cassie Perrin MD   metoprolol tartrate (LOPRESSOR) 25 MG tablet Take 0.5 tablets by mouth 2 times daily 6/5/25   Cassie Perrin MD   nicotine (NICODERM CQ) 21 MG/24HR Place 1 patch onto the skin every 24 hours 6/5/25   Cassie Perrin MD   mupirocin (BACTROBAN) 2 % ointment Apply topically 3 times daily. 6/5/25   Cassie Perrin MD   aspirin

## 2025-06-20 NOTE — PROGRESS NOTES
[facility]  Nephrology Progress Note    Patient's Name: Michelle Nettles     History OF Present Illness From 6/18 Consult Note:  \"We are following this patient for end-stage renal failure .   Full consult was deferred as patient is followed longitudinally in the outpatient setting at [facility] on hemodialysis TTS.  Patient is a 39-year-old female who presented to the emergency department with complaints of just not feeling right.  She states her last dialysis treatment was on Friday for her Saturday treatment as she has her daughter who has special needs on the weekends.  She tells me she has been trying to get switched to a MWF schedule at dialysis but has not been able to as at this point.  She states she has had some problems with pancreatitis and has an upset stomach at this time.  She appears somewhat short of breath has oxygen on.  She also tells us that she has an appointment with vascular surgery for an outpatient procedure tomorrow.  Based on this we will consult vascular surgery so that they may possibly be able to have her surgery done tomorrow as an inpatient.\"    Subjective:      6/20/2025: Patient seen and examined on HD. She reports feeling okay today. She denies any pain. She reports that her breathing is okay. She wants to discharge home today.     Problem List:    Patient Active Problem List   Diagnosis    Poorly controlled type 1 diabetes mellitus (HCC)    Brittle diabetes (HCC)    Type 1 diabetes mellitus with ketoacidosis without coma (HCC)    Uncontrolled type 1 diabetes mellitus with hyperglycemia (HCC)    Hyperkalemia    Nasal polyp    Periorbital cellulitis of right eye    Periorbital cellulitis    Vitamin B deficiency, unspecified    Nasal congestion    Hidradenitis suppurativa    Symptomatic anemia    Normocytic anemia due to blood loss    Acute decompensated heart failure (HCC)    Gall stone    Dietary noncompliance    Metabolic encephalopathy    Hypoxia    Hypotension  Value Date    MG 2.1 06/20/2025    PHOS 4.2 06/20/2025     Vit D, 25-Hydroxy   Date Value Ref Range Status   06/18/2025 17.1 (L) 30.0 - 100.0 ng/mL Final     No results found for: \"PTH\"    No components found for: \"URIC\"    Lab Results   Component Value Date/Time    COLORU Yellow 11/23/2017 10:05 PM    NITRU Negative 11/23/2017 10:05 PM    GLUCOSEU 500 11/23/2017 10:05 PM    GLUCOSEU >=1000 11/21/2010 10:30 PM    KETUA Negative 11/23/2017 10:05 PM    UROBILINOGEN 0.2 11/23/2017 10:05 PM    BILIRUBINUR Negative 11/23/2017 10:05 PM    BILIRUBINUR NEGATIVE 11/21/2010 10:30 PM       No results found for: \"LIPIDPAN\"        Assessment and Plans:    Chronic kidney disease stage V/ESRD-  Patient is a chronic hemodialysis patient at [facility] on TTS  She did have her last dialysis on 6/13 in lieu of dialysis on 6/14 due to childcare issues.  She has been attempting to change days to MWF as she does have childcare issues on the weekends.    Will continue dialysis support -- s/p HD 6/18 with net 3100 UF -- plan for next HD today  Hold on discharge today in anticipation of another dialysis treatment tomorrow prior to discharge      2.  Hypertension with chronic kidney disease stage V/ESRD-  Blood pressure goal less than 130/80  Current blood pressure near goal  Monitor Bps      3.  Secondary hyperparathyroidism of renal origin-  Calcium 8.4, phosphorus 4.2 today   , vitamin D 17.1  On vitamin D supplement  Not on any binders currently   Monitor labs      4.  Anemia of chronic kidney disease stage V/ESRD-  At times compounded by GI bleed  Hemoglobin goal 10 to 12 g/dL  Hgb 7.7 g/dL today -- below goal  Transfuse for Hgb < 7  Monitor H/H     5.  Vascular access dysfunction-  S/p LUE fistulogram 6/19  Vascular surgery following     6. Hyponatremia   Mild, in the setting of ESRD with volume overload  Na+ 131 today  Adjust dialysate accordingly  UF as tolerated with HD   Monitor labs     Hold on discharge today in

## 2025-06-20 NOTE — PROGRESS NOTES
The  attempted visit with the patient who was unable to be assessed. The patient was in dialysis at the time of the visit. The  discussed the charting issue with the  who originally requested the POA and the documentation was updated and the medical card that was scanned in the wrong place was corrected. The  verified with the  where the patient is at during this hospital stay and the information is updated and correct. The patient's nurse will make certain that the current documentation with the decision makers is still the patient's desire. If changes are needed please reach out to spiritual care and the  will updated or change the document according to the patient's wishes. If additional support is needed please reach out to Spiritual Health (ext [phone]).    Rev ELISE Bassett MDIV,

## 2025-06-20 NOTE — DISCHARGE INSTRUCTIONS
Discharge Instructions for Fistulagram      Call Dr. Zaidi's office [phone] for follow-up appointment.      An fistulagram , is an x-ray used by physicians to diagnose blockages and other problems in veins. A catheter is inserted into a blood vessel, and a special dye is injected that makes blood vessels visible when an x-ray is taken.   The procedure can take one hour to several hours. Afterwards, you may need to be in a recovery area for an hour, where you will be monitored by a nurse.    What You Will Need   Along with your medications, you will need bandages   Steps to Take   Home Care    Change the dressing around the catheter incision area as instructed.    Bathe and shower as usual, but keep the wound dry and covered with a bandage for the first day after surgery.    Diet    You may go back to your regular diet after the procedure.   Physical Activity    Do not lift heavy objects or engage in any strenuous exercise or sexual activity for a minimum of 24 hours.    Ask your doctor when you will be able to return to work.    Do not drive until your doctor tells you to do so.    Medications    If you are taking medications, follow these general guidelines:   Talk to your doctor about what the best pain relief medicine for you is.   Take your medication as directednot more, not less, not at a different time. If you are taking warfarin or persantine, your doctor will tell you when to stop these.   Do not stop taking your medications without consulting your healthcare provider.   Don't share medications with anyone else.   Know what effects and side effects to expect, and report them to your healthcare provider.   If you are taking more than one drug, even if it is an over-the-counter medication, herb, or dietary supplement, be sure to check with a physician or pharmacist about drug interactions.   Plan ahead for refills so you don't run out.   Follow-up   Your doctor will discuss the findings and suggest

## 2025-06-20 NOTE — PROGRESS NOTES
Consult received for diabetes education. Chart reviewed. Pt seen 5/27 by me. Pt reports having decrease in activity since surgery. Pt re educated on plate method and importance of consistent carbs at meals. Pt states due to limited mobility, she is unable to cook for herself at this time. Pt states she is still interested in OP education once her mobility improves. Nursing notified of the need for a script for outpatient diabetes classes from physician. Consult will be completed at this time.     Electronically signed by Luz Marina Santiago RN on 6/20/2025 at 1:22 PM

## 2025-06-20 NOTE — CARE COORDINATION
Transition of care update. Per Rounds today, patient is for an US of the gallbladder and an US of the BLE ,to r/o DVT. She is also for HD today. The attending stated ok to d/c if ok with nephrology and if testing ok. Discharge plan is home with [facility] when medically stable. She is active with [facility]. [facility] orders in Epic. Start of care is tentatively for Tuesday 6/23/25. Patient is on HD at [facility] on [place],T-TH-Sat . They will need updated at discharge.  Discharge plan is home when medically stable. She has used amb[facility]lette in past for transport, but can also call provide a ride . [facility] form on soft chart if needed. CM will follow.  Electronically signed by Epi Sellers RN on 6/20/2025 at 12:04 PM

## 2025-06-20 NOTE — PROGRESS NOTES
General surgery consult noted for gallbladder polyp. RUQ ultrasound reviewed. Evidence of cholelithiasis and 5mm polyp. No evidence of cholecystitis. Patient is to be discharged today. She can follow up outpatient with Dr. Haro.    Gena Jacinto MD  Surgery Resident PGY1

## 2025-06-20 NOTE — PROGRESS NOTES
General surgery consult called to  per perfect serv. Resident Stevie notified per perfect serv and added to census.

## 2025-06-20 NOTE — PLAN OF CARE
Problem: Chronic Conditions and Co-morbidities  Goal: Patient's chronic conditions and co-morbidity symptoms are monitored and maintained or improved  6/20/2025 0829 by Alyssa Yin RN  Outcome: Progressing  6/20/2025 0012 by Avis Do RN  Outcome: Progressing     Problem: Skin/Tissue Integrity  Goal: Skin integrity remains intact  Description: 1.  Monitor for areas of redness and/or skin breakdown  2.  Assess vascular access sites hourly  3.  Every 4-6 hours minimum:  Change oxygen saturation probe site  4.  Every 4-6 hours:  If on nasal continuous positive airway pressure, respiratory therapy assess nares and determine need for appliance change or resting period  6/20/2025 0829 by Alyssa Yin RN  Outcome: Progressing  6/20/2025 0012 by Avis Do RN  Outcome: Progressing     Problem: Safety - Adult  Goal: Free from fall injury  6/20/2025 0829 by Alyssa Yin RN  Outcome: Progressing  6/20/2025 0012 by Avis Do RN  Outcome: Progressing     Problem: Pain  Goal: Verbalizes/displays adequate comfort level or baseline comfort level  6/20/2025 0829 by Alyssa Yin RN  Outcome: Progressing  6/20/2025 0012 by Avis Do RN  Outcome: Progressing     Problem: ABCDS Injury Assessment  Goal: Absence of physical injury  6/20/2025 0829 by Alyssa Yin RN  Outcome: Progressing  6/20/2025 0012 by Avis Do RN  Outcome: Progressing     Problem: Neurosensory - Adult  Goal: Achieves stable or improved neurological status  6/20/2025 0012 by Avis Do RN  Outcome: Progressing  Goal: Absence of seizures  6/20/2025 0012 by Avis Do RN  Outcome: Progressing  Goal: Achieves maximal functionality and self care  6/20/2025 0012 by Avis Do RN  Outcome: Progressing     Problem: Cardiovascular - Adult  Goal: Maintains optimal cardiac output and hemodynamic stability  6/20/2025 0829 by Alyssa Yin RN  Outcome: Progressing  6/20/2025 0012 by  Avis Do RN  Outcome: Progressing  Goal: Absence of cardiac dysrhythmias or at baseline  6/20/2025 0012 by Avis Do RN  Outcome: Progressing     Problem: Skin/Tissue Integrity - Adult  Goal: Skin integrity remains intact  Description: 1.  Monitor for areas of redness and/or skin breakdown  2.  Assess vascular access sites hourly  3.  Every 4-6 hours minimum:  Change oxygen saturation probe site  4.  Every 4-6 hours:  If on nasal continuous positive airway pressure, respiratory therapy assess nares and determine need for appliance change or resting period  6/20/2025 0829 by Alyssa Yin RN  Outcome: Progressing  6/20/2025 0012 by Avis Do RN  Outcome: Progressing  Goal: Incisions, wounds, or drain sites healing without S/S of infection  6/20/2025 0012 by Avis Do RN  Outcome: Progressing  Goal: Oral mucous membranes remain intact  6/20/2025 0012 by Avis Do RN  Outcome: Progressing     Problem: Infection - Adult  Goal: Absence of infection at discharge  6/20/2025 0829 by Alyssa Yin RN  Outcome: Progressing  6/20/2025 0012 by Avis Do RN  Outcome: Progressing  Goal: Absence of infection during hospitalization  6/20/2025 0012 by Avis Do RN  Outcome: Progressing  Goal: Absence of fever/infection during anticipated neutropenic period  6/20/2025 0012 by Avis Do RN  Outcome: Progressing

## 2025-06-21 VITALS
OXYGEN SATURATION: 100 % | RESPIRATION RATE: 18 BRPM | SYSTOLIC BLOOD PRESSURE: 164 MMHG | TEMPERATURE: 98.6 F | HEIGHT: 59 IN | HEART RATE: 96 BPM | BODY MASS INDEX: 26.93 KG/M2 | WEIGHT: 133.6 LBS | DIASTOLIC BLOOD PRESSURE: 71 MMHG

## 2025-06-21 LAB
ANION GAP SERPL CALCULATED.3IONS-SCNC: 9 MMOL/L (ref 7–16)
BUN SERPL-MCNC: 14 MG/DL (ref 6–20)
CALCIUM SERPL-MCNC: 8.5 MG/DL (ref 8.6–10)
CHLORIDE SERPL-SCNC: 94 MMOL/L (ref 98–107)
CO2 SERPL-SCNC: 28 MMOL/L (ref 22–29)
CREAT SERPL-MCNC: 3.7 MG/DL (ref 0.5–1)
ERYTHROCYTE [DISTWIDTH] IN BLOOD BY AUTOMATED COUNT: 14.6 % (ref 11.5–15)
GFR, ESTIMATED: 15 ML/MIN/1.73M2
GLUCOSE BLD-MCNC: 164 MG/DL (ref 74–99)
GLUCOSE BLD-MCNC: 374 MG/DL (ref 74–99)
GLUCOSE SERPL-MCNC: 376 MG/DL (ref 74–99)
HCT VFR BLD AUTO: 23.9 % (ref 34–48)
HGB BLD-MCNC: 8 G/DL (ref 11.5–15.5)
MAGNESIUM SERPL-MCNC: 1.9 MG/DL (ref 1.6–2.6)
MCH RBC QN AUTO: 28.7 PG (ref 26–35)
MCHC RBC AUTO-ENTMCNC: 33.5 G/DL (ref 32–34.5)
MCV RBC AUTO: 85.7 FL (ref 80–99.9)
PHOSPHATE SERPL-MCNC: 2.8 MG/DL (ref 2.5–4.5)
PLATELET # BLD AUTO: 210 K/UL (ref 130–450)
PMV BLD AUTO: 9.9 FL (ref 7–12)
POTASSIUM SERPL-SCNC: 4 MMOL/L (ref 3.5–5.1)
RBC # BLD AUTO: 2.79 M/UL (ref 3.5–5.5)
SODIUM SERPL-SCNC: 131 MMOL/L (ref 136–145)
WBC OTHER # BLD: 5.1 K/UL (ref 4.5–11.5)

## 2025-06-21 PROCEDURE — 6370000000 HC RX 637 (ALT 250 FOR IP): Performed by: INTERNAL MEDICINE

## 2025-06-21 PROCEDURE — 36415 COLL VENOUS BLD VENIPUNCTURE: CPT

## 2025-06-21 PROCEDURE — 90935 HEMODIALYSIS ONE EVALUATION: CPT

## 2025-06-21 PROCEDURE — 2500000003 HC RX 250 WO HCPCS: Performed by: INTERNAL MEDICINE

## 2025-06-21 PROCEDURE — 6370000000 HC RX 637 (ALT 250 FOR IP): Performed by: NURSE PRACTITIONER

## 2025-06-21 PROCEDURE — 85027 COMPLETE CBC AUTOMATED: CPT

## 2025-06-21 PROCEDURE — 82962 GLUCOSE BLOOD TEST: CPT

## 2025-06-21 PROCEDURE — 84100 ASSAY OF PHOSPHORUS: CPT

## 2025-06-21 PROCEDURE — 80048 BASIC METABOLIC PNL TOTAL CA: CPT

## 2025-06-21 PROCEDURE — 83735 ASSAY OF MAGNESIUM: CPT

## 2025-06-21 RX ADMIN — GABAPENTIN 100 MG: 100 CAPSULE ORAL at 08:36

## 2025-06-21 RX ADMIN — PANTOPRAZOLE SODIUM 40 MG: 40 TABLET, DELAYED RELEASE ORAL at 06:14

## 2025-06-21 RX ADMIN — INSULIN LISPRO 4 UNITS: 100 INJECTION, SOLUTION INTRAVENOUS; SUBCUTANEOUS at 06:17

## 2025-06-21 RX ADMIN — DIPHENHYDRAMINE HYDROCHLORIDE 25 MG: 25 TABLET ORAL at 01:42

## 2025-06-21 RX ADMIN — CLOPIDOGREL BISULFATE 75 MG: 75 TABLET, FILM COATED ORAL at 08:36

## 2025-06-21 RX ADMIN — DOCUSATE SODIUM 100 MG: 100 CAPSULE, LIQUID FILLED ORAL at 08:36

## 2025-06-21 RX ADMIN — SODIUM CHLORIDE, PRESERVATIVE FREE 10 ML: 5 INJECTION INTRAVENOUS at 08:37

## 2025-06-21 RX ADMIN — ASPIRIN 81 MG: 81 TABLET, COATED ORAL at 08:36

## 2025-06-21 RX ADMIN — METOPROLOL TARTRATE 12.5 MG: 25 TABLET, FILM COATED ORAL at 08:35

## 2025-06-21 RX ADMIN — CYANOCOBALAMIN TAB 1000 MCG 500 MCG: 1000 TAB at 08:36

## 2025-06-21 RX ADMIN — Medication 1 MG: at 08:36

## 2025-06-21 NOTE — PROGRESS NOTES
CLINICAL PHARMACY NOTE: MEDS TO BEDS    Total # of Prescriptions Filled: 2   The following medications were delivered to the patient:  Vitamin D2 1.25 mg  Norco 5/325 mg    Additional Documentation:  To [facility]

## 2025-06-21 NOTE — PROGRESS NOTES
[facility]  Nephrology Progress Note    Patient's Name: Michelle Nettles     History OF Present Illness From 6/18 Consult Note:  \"We are following this patient for end-stage renal failure .   Full consult was deferred as patient is followed longitudinally in the outpatient setting at [facility] on hemodialysis TTS.  Patient is a 39-year-old female who presented to the emergency department with complaints of just not feeling right.  She states her last dialysis treatment was on Friday for her Saturday treatment as she has her daughter who has special needs on the weekends.  She tells me she has been trying to get switched to a MWF schedule at dialysis but has not been able to as at this point.  She states she has had some problems with pancreatitis and has an upset stomach at this time.  She appears somewhat short of breath has oxygen on.  She also tells us that she has an appointment with vascular surgery for an outpatient procedure tomorrow.  Based on this we will consult vascular surgery so that they may possibly be able to have her surgery done tomorrow as an inpatient.\"    Subjective:      6/21/2025: Patient seen and examined on HD. She reports feeling okay today. She denies any pain. She reports that her breathing is okay. Plan to discharge home later today after dialysis.     Problem List:    Patient Active Problem List   Diagnosis    Poorly controlled type 1 diabetes mellitus (HCC)    Brittle diabetes (HCC)    Type 1 diabetes mellitus with ketoacidosis without coma (HCC)    Uncontrolled type 1 diabetes mellitus with hyperglycemia (HCC)    Hyperkalemia    Nasal polyp    Periorbital cellulitis of right eye    Periorbital cellulitis    Vitamin B deficiency, unspecified    Nasal congestion    Hidradenitis suppurativa    Symptomatic anemia    Normocytic anemia due to blood loss    Acute decompensated heart failure (HCC)    Gall stone    Dietary noncompliance    Metabolic encephalopathy    Hypoxia  APRN - CNP    Patient seen and examined all key components of the physical performed independently , case discussed with NP, all pertinent labs and radiologic tests personally reviewed agree with above.      Procedure: Hemodialysis    Danial Gerard MD

## 2025-06-21 NOTE — DISCHARGE INSTR - COC
Continuity of Care Form    Patient Name: Michelle Nettles   :  1985  MRN:  [id number]    Admit date:  2025  Discharge date:  2025    Code Status Order: Full Code   Advance Directives:    Date/Time Healthcare Directive Type of Healthcare Directive Copy in Chart Healthcare Agent Appointed Healthcare Agent's Name Healthcare Agent's Phone Number    [phone] No, patient does not have an advance directive for healthcare treatment  --  --  --  --  --             Admitting Physician:  Casi Batista DO  PCP: Rogers Rodríguez MD    Discharging Nurse: Devin  Discharging Hospital Unit/Room#: 7408/7408-A  Discharging Unit Phone Number: [phone]    Emergency Contact:   Extended Emergency Contact Information  Primary Emergency Contact: [facility]  Address: [address] [place] [address] [place] [address] [facility]  Home Phone: [phone]  Mobile Phone: [phone]  Relation: Brother/Sister  Preferred language: English   needed? No  Secondary Emergency Contact: Cain Musa-PRIMARY ON POA  Address: [address] [place] [address]            [place] [address] [facility]  Home Phone: [phone]  Relation: Brother/Sister  Preferred language: English   needed? No    Past Surgical History:  Past Surgical History:   Procedure Laterality Date    ANKLE SURGERY Left 3/7/2025    REMOVAL OF EXTERNAL FIXATOR FROM LEFT LOWER EXTREMITY, OPEN REDUCTION WITH INTERNAL FIXATION LEFT ANKLE TRIMALLEOLAR FRACTURE performed by Peyman Germain MD at [facility] OR     SECTION  2013    COLONOSCOPY N/A 2024    COLONOSCOPY DIAGNOSTIC performed by Balaji Gonzalez MD at [facility] ENDOSCOPY    COLONOSCOPY N/A 3/18/2025    COLONOSCOPY DIAGNOSTIC performed by Madhu Cordova MD at [facility] ENDOSCOPY    CYST INCISION AND DRAINAGE  2011    perirectal abscess. [facility]. Dr. Fleming    DIALYSIS FISTULA CREATION Left 2019    LEFT ARM FISTULAGRAM, BALLOON  nares 2025               Resolved       Infection Onset Added Last Indicated Last Indicated By Resolved Resolved By    MRSA 24 Culture, Wound 25 Joanna Johnson, JENNIFER    MRSA skin abscess 2024  MRSA nares 2024    Parainfluenza 23 Respiratory Panel, Molecular, with COVID-19 (Restricted: peds pts or suitable admitted adults) 23 Infection     COVID-19 23 COVID-19, Rapid 23 Infection     MRSA 23 Culture, MRSA, Screening 23 Veronica Montalvo, RN    Nares 23                         Nurse Assessment:  Last Vital Signs: BP (!) 164/71   Pulse 96   Temp 98.6 °F (37 °C)   Resp 18   Ht 1.499 m (4' 11\")   Wt 60.6 kg (133 lb 9.6 oz)   SpO2 100%   BMI 26.98 kg/m²     Last documented pain score (0-10 scale): Pain Level: 7  Last Weight:   Wt Readings from Last 1 Encounters:   25 60.6 kg (133 lb 9.6 oz)     Mental Status:  {IP PT MENTAL STATUS:61838}    IV Access:  { SYLVIE IV ACCESS:528085099}    Nursing Mobility/ADLs:  Walking   {P DME ADLs:455276805}  Transfer  {P DME ADLs:373310327}  Bathing  {P DME ADLs:520377670}  Dressing  {CHP DME ADLs:234057116}  Toileting  {CHP DME ADLs:653882692}  Feeding  {CHP DME ADLs:551533265}  Med Admin  {P DME ADLs:042701373}  Med Delivery   { SYLVIE MED Delivery:912863579}    Wound Care Documentation and Therapy:  Incision 25 Knee Left (Active)   Dressing Status Clean;Dry;Intact 25 0733   Incision Cleansed Cleansed with saline 25 0930   Dressing/Treatment Open to air 25   Closure Adhesive bandage 25 0910   Drainage Amount None (dry) 25 0853   Odor None 25 0853   Number of days: 36        Elimination:  Continence:   Bowel: {YES / NO:}  Bladder: {YES / NO:}  Urinary Catheter: {Urinary Catheter:867914985}   Colostomy/Ileostomy/Ileal Conduit: {YES / NO:}       Date of Last BM:

## 2025-06-21 NOTE — PLAN OF CARE
Problem: Chronic Conditions and Co-morbidities  Goal: Patient's chronic conditions and co-morbidity symptoms are monitored and maintained or improved  6/21/2025 1139 by Daphnie Tse RN  Outcome: Progressing  6/20/2025 2306 by Kimberly Bassett RN  Outcome: Progressing     Problem: Discharge Planning  Goal: Discharge to home or other facility with appropriate resources  6/21/2025 1139 by Daphnie Tse RN  Outcome: Progressing  6/20/2025 2306 by Kimberly Bassett RN  Outcome: Progressing     Problem: Skin/Tissue Integrity  Goal: Skin integrity remains intact  Description: 1.  Monitor for areas of redness and/or skin breakdown  2.  Assess vascular access sites hourly  3.  Every 4-6 hours minimum:  Change oxygen saturation probe site  4.  Every 4-6 hours:  If on nasal continuous positive airway pressure, respiratory therapy assess nares and determine need for appliance change or resting period  6/21/2025 1139 by Daphnie Tse RN  Outcome: Progressing  6/20/2025 2306 by Kimberly Bassett RN  Outcome: Progressing     Problem: Safety - Adult  Goal: Free from fall injury  6/21/2025 1139 by Daphnie Tse RN  Outcome: Progressing  6/20/2025 2306 by Kimberly Bassett RN  Outcome: Progressing     Problem: Pain  Goal: Verbalizes/displays adequate comfort level or baseline comfort level  6/21/2025 1139 by Daphnie Tse RN  Outcome: Progressing  6/20/2025 2306 by Kimberly Bassett RN  Outcome: Progressing     Problem: ABCDS Injury Assessment  Goal: Absence of physical injury  6/21/2025 1139 by Daphnie Tse RN  Outcome: Progressing  6/20/2025 2306 by Kimberly Bassett RN  Outcome: Progressing     Problem: Neurosensory - Adult  Goal: Achieves stable or improved neurological status  6/21/2025 1139 by Daphnie Tse RN  Outcome: Progressing  6/20/2025 2306 by Kimberly Bassett RN  Outcome: Progressing  Goal: Absence of seizures  6/21/2025 1139 by Daphnie Tse RN  Outcome: Progressing  6/20/2025 2306 by Serjio  JENNIFER Harris  Outcome: Progressing  Goal: Achieves maximal functionality and self care  6/21/2025 1139 by Daphnie Tse RN  Outcome: Progressing  6/20/2025 2306 by Kimberly Bassett RN  Outcome: Progressing     Problem: Respiratory - Adult  Goal: Achieves optimal ventilation and oxygenation  6/21/2025 1139 by Daphnie Tse RN  Outcome: Progressing  6/20/2025 2306 by Kimberly Bassett RN  Outcome: Progressing     Problem: Cardiovascular - Adult  Goal: Maintains optimal cardiac output and hemodynamic stability  6/21/2025 1139 by Daphnie Tse RN  Outcome: Progressing  6/20/2025 2306 by Kimberly Bassett RN  Outcome: Progressing  Goal: Absence of cardiac dysrhythmias or at baseline  6/21/2025 1139 by Daphnie Tse RN  Outcome: Progressing  6/20/2025 2306 by Kimberly Bassett RN  Outcome: Progressing     Problem: Skin/Tissue Integrity - Adult  Goal: Skin integrity remains intact  Description: 1.  Monitor for areas of redness and/or skin breakdown  2.  Assess vascular access sites hourly  3.  Every 4-6 hours minimum:  Change oxygen saturation probe site  4.  Every 4-6 hours:  If on nasal continuous positive airway pressure, respiratory therapy assess nares and determine need for appliance change or resting period  6/21/2025 1139 by Daphnie Tse RN  Outcome: Progressing  6/20/2025 2306 by Kimberly Bassett RN  Outcome: Progressing  Goal: Incisions, wounds, or drain sites healing without S/S of infection  6/21/2025 1139 by Daphnie Tse RN  Outcome: Progressing  6/20/2025 2306 by Kimberly Bassett RN  Outcome: Progressing  Goal: Oral mucous membranes remain intact  6/21/2025 1139 by Daphnie Tse RN  Outcome: Progressing  6/20/2025 2306 by Kimberly Bassett RN  Outcome: Progressing     Problem: Musculoskeletal - Adult  Goal: Return mobility to safest level of function  6/21/2025 1139 by Daphnie Tse RN  Outcome: Progressing  6/20/2025 2306 by Kimberly Bassett RN  Outcome: Progressing  Goal: Maintain proper

## 2025-06-21 NOTE — CARE COORDINATION
CM update: DC order noted. RN-CM spoke to nursing who reports the pt is scheduled to get dialysis today and will DC this evening. She will call CM to notify if pt requires an ambulette or Provide a ride. Jada at [facility] [address] notified of DC today. [facility] notified of DC.     14:28 RN-TELMA notified the pt needs transportation home. [facility] ambulette has been arranged to pick the pt up at 15:30. Nursing notified. MYLA ARIASN, RN  Case Management   [phone]

## 2025-06-21 NOTE — DISCHARGE SUMMARY
Hospitalist Discharge Summary    Patient ID: Michelle Nettles   Patient : 1985  Patient's PCP: Rogers Rodríguez MD    Admit Date: 2025   Admitting Physician: Casi Batista DO    Discharge Date:  2025   Discharge Physician: Trenton Peters MD   Discharge Condition: Stable  Discharge Disposition: Home      Hospital course in brief:  (Please refer to daily progress notes for a comprehensive review of the hospitalization by requesting medical records)      This is a 40-year-old lady with past medical history type 2 diabetes mellitus, history of pancreatitis, ESRD, COPD who presented to [facility] for abdominal pain and shortness of breath.  She stated that her she missed her dialysis and was not feeling well.  She admitted to shortness of breath and abdominal pain.  She recently had pancreatitis.  Her blood sugar has been running high.  She has been diagnosed with brittle diabetes.  In the ER, workup was suggestive of DKA.  CTA chest no evidence of pulmonary embolism.  Interstitial prominence with groundglass opacity concerning for interstitial edema.  Small bilateral pleural effusion, right more than left.  Mediastinal lymphadenopathy.  Hepatomegaly with signs of steatosis.  Atrophic kidneys. CT abdomen pelvis cholelithiasis without evidence of cholecystitis.  She was stabilized in ICU and was then transferred to intermediate floor.  Nephrology was consulted for hemodialysis needs.  Vascular surgery was consulted for concerns regarding dialysis access.  History of previous left upper extremity brachiocephalic AV fistula.  Prolonged bleeding following treatment s/p fistulogram .  Dialysis center using it for hemodialysis and is running full treatment.  She is not experiencing any symptoms of steal syndrome.  Swelling has resolved.  She has been deemed stable from vascular standpoint for discharge and outpatient follow-up.  Gallbladder ultrasound showed GB  without CT evidence of acute cholecystitis. 3. Severe chronic atrophy of the bilateral kidneys, suggestive of chronic renal failure. No hydronephrosis.       Discharge Medications:      Medication List        START taking these medications      clopidogrel 75 MG tablet  Commonly known as: PLAVIX  Take 1 tablet by mouth daily     HYDROcodone-acetaminophen 5-325 MG per tablet  Commonly known as: NORCO  Take 1 tablet by mouth every 6 hours as needed for Pain for up to 3 days. Max Daily Amount: 4 tablets     Vitamin D (Ergocalciferol) 28811 units Caps  Take 50,000 Units by mouth once a week for 7 doses  Start taking on: June 25, 2025            CONTINUE taking these medications      Accu-Chek Guide w/Device Kit  Use to test blood sugars     Accu-Chek Softclix Lancets Misc  1 each by Does not apply route 4 times daily     * albuterol (5 MG/ML) 0.5% nebulizer solution  Commonly known as: PROVENTIL  Take 1 mL by nebulization 4 times daily as needed for Wheezing     * albuterol sulfate  (90 Base) MCG/ACT inhaler  Commonly known as: PROVENTIL;VENTOLIN;PROAIR  Inhale 2 puffs into the lungs every 4-6 hours as needed for Wheezing or Shortness of Breath     Alcohol Prep Pads  Use as needed     bisacodyl 10 MG suppository  Commonly known as: DULCOLAX     * Blood Pressure Kit Skylar  Check BP daily     * Blood Pressure Cuff Misc  Dx:  Hypertension with labile blood pressure. Check daily. Automated arm cuff     Dexcom G7  Skylar  Used with sensors to check blood sugar     Dexcom G7 Sensor Misc  Change sensor every 10 days     diphenhydrAMINE 25 MG tablet  Commonly known as: BENADRYL  Take 1 tablet by mouth every 6 hours as needed for Itching     docusate sodium 100 MG capsule  Commonly known as: COLACE  Take 1 capsule by mouth daily     ferrous sulfate 325 (65 Fe) MG tablet  Commonly known as: IRON 325  Take 1 tablet by mouth every other day     fluticasone 50 MCG/ACT nasal spray  Commonly known as: FLONASE  2 sprays by

## 2025-06-21 NOTE — PLAN OF CARE
Problem: Chronic Conditions and Co-morbidities  Goal: Patient's chronic conditions and co-morbidity symptoms are monitored and maintained or improved  Outcome: Progressing     Problem: Discharge Planning  Goal: Discharge to home or other facility with appropriate resources  Outcome: Progressing     Problem: Skin/Tissue Integrity  Goal: Skin integrity remains intact  Description: 1.  Monitor for areas of redness and/or skin breakdown  2.  Assess vascular access sites hourly  3.  Every 4-6 hours minimum:  Change oxygen saturation probe site  4.  Every 4-6 hours:  If on nasal continuous positive airway pressure, respiratory therapy assess nares and determine need for appliance change or resting period  Outcome: Progressing     Problem: Safety - Adult  Goal: Free from fall injury  Outcome: Progressing     Problem: Pain  Goal: Verbalizes/displays adequate comfort level or baseline comfort level  Outcome: Progressing     Problem: ABCDS Injury Assessment  Goal: Absence of physical injury  Outcome: Progressing     Problem: Neurosensory - Adult  Goal: Achieves stable or improved neurological status  Outcome: Progressing  Goal: Absence of seizures  Outcome: Progressing  Goal: Achieves maximal functionality and self care  Outcome: Progressing     Problem: Respiratory - Adult  Goal: Achieves optimal ventilation and oxygenation  Outcome: Progressing     Problem: Cardiovascular - Adult  Goal: Maintains optimal cardiac output and hemodynamic stability  Outcome: Progressing  Goal: Absence of cardiac dysrhythmias or at baseline  Outcome: Progressing     Problem: Skin/Tissue Integrity - Adult  Goal: Skin integrity remains intact  Description: 1.  Monitor for areas of redness and/or skin breakdown  2.  Assess vascular access sites hourly  3.  Every 4-6 hours minimum:  Change oxygen saturation probe site  4.  Every 4-6 hours:  If on nasal continuous positive airway pressure, respiratory therapy assess nares and determine need for

## 2025-06-21 NOTE — FLOWSHEET NOTE
06/18/25 0000   Vital Signs   BP (!) 163/73   Temp 98.2 °F (36.8 °C)   Pulse 97   Respirations 19   SpO2 100 %   Post-Hemodialysis Assessment   Post-Treatment Procedures Blood returned;Access bleeding time > 10 minutes   Machine Disinfection Process Acid/Vinegar Clean;Heat Disinfect;Exterior Machine Disinfection   Rinseback Volume (ml) 300 ml   Blood Volume Processed (Liters) 90.1 L   Dialyzer Clearance Lightly streaked   Duration of Treatment (minutes) 240 minutes   Hemodialysis Intake (ml) 600 ml   Hemodialysis Output (ml) 2600 ml   NET Removed (ml) 2000   Tolerated Treatment Good   Interventions Taken Ultrafiltration goal decreased   Patient Response to Treatment BP running high, unit of blood given, fistula sites held for 45 minutes   Bilateral Breath Sounds Diminished   Edema Generalized   Edema Generalized +1   RLE Edema +1;Pitting   LLE Edema +1;Pitting   Facial Edema Non-pitting   Physician Notified No   Time Off 2350   Patient Disposition Remain in ICU/ED   Observations & Evaluations   Level of Consciousness 0   Respiratory Quality/Effort Unlabored   O2 Device Nasal cannula   Abdomen Inspection Rounded;Soft   Handoff   Handoff Given To Jania Root   Handoff Received From Analilia Varner   Handoff Communication At bedside       
   06/20/25 1711   Vital Signs   BP (!) 152/79   Temp 98.5 °F (36.9 °C)   Pulse 91   Respirations 20   Weight - Scale 61.9 kg (136 lb 7.4 oz)   Weight Method Estimated   Percent Weight Change -4.64   Pain Assessment   Pain Assessment None - Denies Pain   Post-Hemodialysis Assessment   Post-Treatment Procedures Blood returned;Access bleeding time < 10 minutes   Machine Disinfection Process Exterior Machine Disinfection   Rinseback Volume (ml) 300 ml   Blood Volume Processed (Liters) 85.2 L   Dialyzer Clearance Lightly streaked   Duration of Treatment (minutes) 240 minutes   Hemodialysis Intake (ml) 300 ml   Hemodialysis Output (ml) 3300 ml   NET Removed (ml) 3000   Tolerated Treatment Good   Patient Response to Treatment tolerated tx well, 3000 net removed, needles pulled, hemostasis achieved, (+)B/T, stable at dc. post report to Alyssa Yin RN   Bilateral Breath Sounds Diminished   Edema Facial   Facial Edema +2   Time Off 1706   Patient Disposition Return to room   Observations & Evaluations   Level of Consciousness 0   Oriented X 4   Heart Rhythm Regular   Respiratory Quality/Effort Unlabored   O2 Device Nasal cannula   Skin Color Pink   Skin Condition/Temp Dry;Warm       
   06/21/25 1330   Vital Signs   BP (!) 164/71   Temp 98.6 °F (37 °C)   Pulse 96   Respirations 18   Weight - Scale 60.6 kg (133 lb 9.6 oz)   Weight Method Estimated   Percent Weight Change -2.1   Pain Assessment   Pain Assessment None - Denies Pain   Post-Hemodialysis Assessment   Post-Treatment Procedures Blood returned;Access bleeding time < 10 minutes   Machine Disinfection Process Acid/Vinegar Clean;Verified Absence of Bleach in HCO3 Jug;Heat Disinfect;Machine Absence of Bleach Machine;Exterior Machine Disinfection;Bicarb Jug Disinfection   Rinseback Volume (ml) 300 ml   Blood Volume Processed (Liters) 89.4 L   Dialyzer Clearance Lightly streaked   Duration of Treatment (minutes) 240 minutes   Hemodialysis Intake (ml) 300 ml   Hemodialysis Output (ml) 2600 ml   NET Removed (ml) 2300   Tolerated Treatment Good   Patient Response to Treatment Net -2300mL off with treatment.  Vitals stable throughout treatment.  Left upper arm AVF sites held for minimal time.  No issues.  Pt returned to room via transport.   Bilateral Breath Sounds Diminished   Edema Right lower extremity;Left lower extremity;Facial   RLE Edema +2   LLE Edema +2   Facial Edema +1   Time Off 1320   Patient Disposition Return to room   Observations & Evaluations   Level of Consciousness 0   Oriented X 3   Heart Rhythm Regular   Respiratory Quality/Effort Unlabored   O2 Device Nasal cannula   Skin Color Dusky   Skin Condition/Temp Dry;Flaky   Abdomen Inspection Soft;Rounded   Bowel Sounds (All Quadrants) Active       
negative

## 2025-06-21 NOTE — PROGRESS NOTES
Vascular Surgery Progress Note    Pt is being seen in f/u today regarding dialysis access    Subjective  Pt s/e. Left arm edema almost resolved s/p fistulogram.  + sob stable.  Pain in arm continues to be much better.    Current Medications:    dextrose      dextrose 5 % and 0.45 % NaCl Stopped (06/18/25 1525)    sodium chloride        glucose, dextrose bolus **OR** dextrose bolus, glucagon (rDNA), dextrose, HYDROcodone 5 mg - acetaminophen, dextrose 5 % and 0.45 % NaCl, sodium chloride, ondansetron **OR** ondansetron, polyethylene glycol, acetaminophen **OR** acetaminophen, albuterol, bisacodyl, diphenhydrAMINE, sodium chloride flush, white petrolatum, labetalol **OR** hydrALAZINE    clopidogrel  75 mg Oral Daily    vitamin D  50,000 Units Oral Weekly    insulin glargine  5 Units SubCUTAneous Nightly    insulin lispro  0-4 Units SubCUTAneous 4x Daily AC & HS    sodium chloride flush  5-40 mL IntraVENous 2 times per day    aspirin  81 mg Oral BID    Virt-Caps  1 mg Oral Daily    docusate sodium  100 mg Oral Daily    ferrous sulfate  325 mg Oral Every Other Day    gabapentin  100 mg Oral BID    lidocaine  1 patch TransDERmal Daily    metoprolol tartrate  12.5 mg Oral BID    [Held by provider] midodrine  2.5 mg Oral BID WC    pantoprazole  40 mg Oral QAM AC    vitamin B-12  500 mcg Oral Daily      PHYSICAL EXAM:    BP (!) 149/74   Pulse 100   Temp 98.8 °F (37.1 °C) (Temporal)   Resp 20   Ht 1.499 m (4' 11\")   Wt 61.9 kg (136 lb 7.4 oz)   SpO2 100%   BMI 27.56 kg/m²     Intake/Output Summary (Last 24 hours) at 6/21/2025 0907  Last data filed at 6/21/2025 0849  Gross per 24 hour   Intake 540 ml   Output 3300 ml   Net -2760 ml        Gen Awake, alert, oriented x3, in no apparent distress   CVS RRR  Resp No increased work of breathing   LUE:  edema improved, AVF +thrill  R LE  No edema  L LE  No edema    LABS:    Lab Results   Component Value Date    WBC 5.1 06/21/2025    HGB 8.0 (L) 06/21/2025    HCT 23.9 (L)

## 2025-06-22 LAB
MICROORGANISM SPEC CULT: NORMAL
MICROORGANISM SPEC CULT: NORMAL
SERVICE CMNT-IMP: NORMAL
SERVICE CMNT-IMP: NORMAL
SPECIMEN DESCRIPTION: NORMAL
SPECIMEN DESCRIPTION: NORMAL

## 2025-06-25 ENCOUNTER — OFFICE VISIT (OUTPATIENT)
Dept: PRIMARY CARE CLINIC | Age: 40
End: 2025-06-25
Payer: MEDICARE

## 2025-06-25 ENCOUNTER — HOSPITAL ENCOUNTER (EMERGENCY)
Age: 40
Discharge: HOME | End: 2025-06-26
Attending: EMERGENCY MEDICINE
Payer: MEDICARE

## 2025-06-25 VITALS
HEART RATE: 95 BPM | SYSTOLIC BLOOD PRESSURE: 107 MMHG | TEMPERATURE: 97.7 F | RESPIRATION RATE: 18 BRPM | WEIGHT: 133 LBS | OXYGEN SATURATION: 100 % | HEIGHT: 59 IN | BODY MASS INDEX: 26.81 KG/M2 | DIASTOLIC BLOOD PRESSURE: 52 MMHG

## 2025-06-25 DIAGNOSIS — F17.200 TOBACCO USE DISORDER: ICD-10-CM

## 2025-06-25 DIAGNOSIS — S72.435P: ICD-10-CM

## 2025-06-25 DIAGNOSIS — L70.0 ACNE VULGARIS: ICD-10-CM

## 2025-06-25 DIAGNOSIS — Z87.19 HISTORY OF PANCREATITIS: ICD-10-CM

## 2025-06-25 DIAGNOSIS — R73.9 HYPERGLYCEMIA: ICD-10-CM

## 2025-06-25 DIAGNOSIS — E10.9 BRITTLE DIABETES (HCC): ICD-10-CM

## 2025-06-25 DIAGNOSIS — K21.9 GASTROESOPHAGEAL REFLUX DISEASE WITHOUT ESOPHAGITIS: ICD-10-CM

## 2025-06-25 DIAGNOSIS — D64.9 ACUTE ON CHRONIC ANEMIA: Primary | ICD-10-CM

## 2025-06-25 DIAGNOSIS — I50.22 CHRONIC SYSTOLIC (CONGESTIVE) HEART FAILURE (HCC): ICD-10-CM

## 2025-06-25 DIAGNOSIS — N18.6 END STAGE RENAL DISEASE (HCC): ICD-10-CM

## 2025-06-25 DIAGNOSIS — I50.32 CHRONIC HEART FAILURE WITH PRESERVED EJECTION FRACTION (HFPEF) (HCC): ICD-10-CM

## 2025-06-25 DIAGNOSIS — D63.8 ANEMIA OF CHRONIC DISEASE: ICD-10-CM

## 2025-06-25 LAB
ALBUMIN SERPL-MCNC: 3.5 G/DL (ref 3.5–5.2)
ALP SERPL-CCNC: 128 U/L (ref 35–104)
ALT SERPL-CCNC: <5 U/L (ref 0–35)
ANION GAP SERPL CALCULATED.3IONS-SCNC: 16 MMOL/L (ref 7–16)
AST SERPL-CCNC: 17 U/L (ref 0–35)
BASOPHILS # BLD: 0.02 K/UL (ref 0–0.2)
BASOPHILS NFR BLD: 0 % (ref 0–2)
BILIRUB SERPL-MCNC: 0.3 MG/DL (ref 0–1.2)
BUN SERPL-MCNC: 20 MG/DL (ref 6–20)
CALCIUM SERPL-MCNC: 8.8 MG/DL (ref 8.6–10)
CHLORIDE SERPL-SCNC: 91 MMOL/L (ref 98–107)
CO2 SERPL-SCNC: 24 MMOL/L (ref 22–29)
CREAT SERPL-MCNC: 4.9 MG/DL (ref 0.5–1)
EOSINOPHIL # BLD: 0.32 K/UL (ref 0.05–0.5)
EOSINOPHILS RELATIVE PERCENT: 5 % (ref 0–6)
ERYTHROCYTE [DISTWIDTH] IN BLOOD BY AUTOMATED COUNT: 15.1 % (ref 11.5–15)
GFR, ESTIMATED: 11 ML/MIN/1.73M2
GLUCOSE SERPL-MCNC: 427 MG/DL (ref 74–99)
HCT VFR BLD AUTO: 19 % (ref 34–48)
HGB BLD-MCNC: 6.4 G/DL (ref 11.5–15.5)
IMM GRANULOCYTES # BLD AUTO: 0.03 K/UL (ref 0–0.58)
IMM GRANULOCYTES NFR BLD: 1 % (ref 0–5)
LIPASE SERPL-CCNC: 58 U/L (ref 13–60)
LYMPHOCYTES NFR BLD: 0.75 K/UL (ref 1.5–4)
LYMPHOCYTES RELATIVE PERCENT: 12 % (ref 20–42)
MCH RBC QN AUTO: 28.8 PG (ref 26–35)
MCHC RBC AUTO-ENTMCNC: 33.7 G/DL (ref 32–34.5)
MCV RBC AUTO: 85.6 FL (ref 80–99.9)
MONOCYTES NFR BLD: 0.52 K/UL (ref 0.1–0.95)
MONOCYTES NFR BLD: 8 % (ref 2–12)
NEUTROPHILS NFR BLD: 74 % (ref 43–80)
NEUTS SEG NFR BLD: 4.55 K/UL (ref 1.8–7.3)
PLATELET # BLD AUTO: 222 K/UL (ref 130–450)
PMV BLD AUTO: 9.7 FL (ref 7–12)
POTASSIUM SERPL-SCNC: 3.7 MMOL/L (ref 3.5–5.1)
PROT SERPL-MCNC: 7.1 G/DL (ref 6.4–8.3)
RBC # BLD AUTO: 2.22 M/UL (ref 3.5–5.5)
SODIUM SERPL-SCNC: 132 MMOL/L (ref 136–145)
WBC OTHER # BLD: 6.2 K/UL (ref 4.5–11.5)

## 2025-06-25 PROCEDURE — 99214 OFFICE O/P EST MOD 30 MIN: CPT | Performed by: FAMILY MEDICINE

## 2025-06-25 PROCEDURE — 86850 RBC ANTIBODY SCREEN: CPT

## 2025-06-25 PROCEDURE — 83690 ASSAY OF LIPASE: CPT

## 2025-06-25 PROCEDURE — 96375 TX/PRO/DX INJ NEW DRUG ADDON: CPT

## 2025-06-25 PROCEDURE — 85025 COMPLETE CBC W/AUTO DIFF WBC: CPT

## 2025-06-25 PROCEDURE — 1111F DSCHRG MED/CURRENT MED MERGE: CPT | Performed by: FAMILY MEDICINE

## 2025-06-25 PROCEDURE — 86901 BLOOD TYPING SEROLOGIC RH(D): CPT

## 2025-06-25 PROCEDURE — 4004F PT TOBACCO SCREEN RCVD TLK: CPT | Performed by: FAMILY MEDICINE

## 2025-06-25 PROCEDURE — 80053 COMPREHEN METABOLIC PANEL: CPT

## 2025-06-25 PROCEDURE — 99285 EMERGENCY DEPT VISIT HI MDM: CPT

## 2025-06-25 PROCEDURE — P9016 RBC LEUKOCYTES REDUCED: HCPCS

## 2025-06-25 PROCEDURE — G8417 CALC BMI ABV UP PARAM F/U: HCPCS | Performed by: FAMILY MEDICINE

## 2025-06-25 PROCEDURE — 6360000002 HC RX W HCPCS: Performed by: EMERGENCY MEDICINE

## 2025-06-25 PROCEDURE — 86923 COMPATIBILITY TEST ELECTRIC: CPT

## 2025-06-25 PROCEDURE — 86900 BLOOD TYPING SEROLOGIC ABO: CPT

## 2025-06-25 PROCEDURE — 3052F HG A1C>EQUAL 8.0%<EQUAL 9.0%: CPT | Performed by: FAMILY MEDICINE

## 2025-06-25 PROCEDURE — 2022F DILAT RTA XM EVC RTNOPTHY: CPT | Performed by: FAMILY MEDICINE

## 2025-06-25 PROCEDURE — G8427 DOCREV CUR MEDS BY ELIG CLIN: HCPCS | Performed by: FAMILY MEDICINE

## 2025-06-25 RX ORDER — ERGOCALCIFEROL 1.25 MG/1
50000 CAPSULE ORAL WEEKLY
Qty: 3 CAPSULE | Refills: 0 | Status: SHIPPED | OUTPATIENT
Start: 2025-06-25 | End: 2025-07-10

## 2025-06-25 RX ORDER — ADHESIVE BANDAGE 3/4"
BANDAGE TOPICAL
Qty: 1 EACH | Refills: 0 | Status: SHIPPED | OUTPATIENT
Start: 2025-06-25

## 2025-06-25 RX ORDER — NICOTINE 21 MG/24HR
1 PATCH, TRANSDERMAL 24 HOURS TRANSDERMAL EVERY 24 HOURS
Qty: 28 PATCH | Refills: 0 | Status: SHIPPED | OUTPATIENT
Start: 2025-06-25

## 2025-06-25 RX ORDER — CLINDAMYCIN PHOSPHATE 10 MG/G
1 GEL TOPICAL DAILY
Qty: 60 G | Refills: 1 | Status: SHIPPED | OUTPATIENT
Start: 2025-06-25

## 2025-06-25 RX ORDER — FENTANYL CITRATE 50 UG/ML
25 INJECTION, SOLUTION INTRAMUSCULAR; INTRAVENOUS ONCE
Refills: 0 | Status: COMPLETED | OUTPATIENT
Start: 2025-06-25 | End: 2025-06-25

## 2025-06-25 RX ORDER — METOPROLOL TARTRATE 25 MG/1
12.5 TABLET, FILM COATED ORAL 2 TIMES DAILY
Qty: 90 TABLET | Refills: 1 | Status: ON HOLD | OUTPATIENT
Start: 2025-06-25 | End: 2025-07-04 | Stop reason: HOSPADM

## 2025-06-25 RX ORDER — DIPHENHYDRAMINE HCL 25 MG
25 TABLET ORAL EVERY 8 HOURS PRN
Qty: 90 TABLET | Refills: 3 | Status: SHIPPED | OUTPATIENT
Start: 2025-06-25

## 2025-06-25 RX ORDER — HYDROCODONE BITARTRATE AND ACETAMINOPHEN 5; 325 MG/1; MG/1
1 TABLET ORAL EVERY 6 HOURS PRN
Qty: 28 TABLET | Refills: 0 | Status: ON HOLD | OUTPATIENT
Start: 2025-06-25 | End: 2025-07-04 | Stop reason: HOSPADM

## 2025-06-25 RX ORDER — PANTOPRAZOLE SODIUM 40 MG/1
40 TABLET, DELAYED RELEASE ORAL
Qty: 90 TABLET | Refills: 1 | Status: SHIPPED | OUTPATIENT
Start: 2025-06-25

## 2025-06-25 RX ORDER — SODIUM CHLORIDE 9 MG/ML
INJECTION, SOLUTION INTRAVENOUS PRN
Status: DISCONTINUED | OUTPATIENT
Start: 2025-06-25 | End: 2025-06-26 | Stop reason: HOSPADM

## 2025-06-25 RX ORDER — FENTANYL CITRATE 50 UG/ML
INJECTION, SOLUTION INTRAMUSCULAR; INTRAVENOUS
Status: DISCONTINUED
Start: 2025-06-25 | End: 2025-06-26 | Stop reason: HOSPADM

## 2025-06-25 RX ORDER — LIDOCAINE 4 G/G
1 PATCH TOPICAL DAILY
Qty: 30 EACH | Refills: 1 | Status: SHIPPED | OUTPATIENT
Start: 2025-06-25

## 2025-06-25 RX ADMIN — FENTANYL CITRATE 25 MCG: 50 INJECTION, SOLUTION INTRAMUSCULAR; INTRAVENOUS at 23:38

## 2025-06-25 ASSESSMENT — PAIN - FUNCTIONAL ASSESSMENT: PAIN_FUNCTIONAL_ASSESSMENT: 0-10

## 2025-06-25 ASSESSMENT — PAIN DESCRIPTION - ORIENTATION: ORIENTATION: LEFT

## 2025-06-25 ASSESSMENT — PAIN DESCRIPTION - DESCRIPTORS: DESCRIPTORS: ACHING;DISCOMFORT;TENDER

## 2025-06-25 ASSESSMENT — PAIN SCALES - GENERAL: PAINLEVEL_OUTOF10: 8

## 2025-06-25 ASSESSMENT — PAIN DESCRIPTION - LOCATION: LOCATION: KNEE

## 2025-06-25 NOTE — ASSESSMENT & PLAN NOTE
Follow up w Endo. Interested in GLP1a, but reviewed their contraindication with this history. DM per Endo.

## 2025-06-25 NOTE — PROGRESS NOTES
Michelle Nettles (:  1985) is a 40 y.o. female, Established patient, here for evaluation of the following chief complaint(s):  Follow-Up from Hospital (Hospital follow up on 25 for End stage renal disease.)    Assessment & Plan  History of pancreatitis  Follow up w Endo. Interested in GLP1a, but reviewed their contraindication with this history. DM per Endo.         Acute on chronic anemia   Will go to ED from here          Anemia of chronic disease         Brittle diabetes (HCC)   Chronic, not at goal (unstable), per Endo         Chronic heart failure with preserved ejection fraction (HFpEF) ([facility])   Euvolemic          End stage renal disease ([facility])     Orders:    Blood Pressure Monitoring (BLOOD PRESSURE CUFF) MISC; Dx:  Hypertension with labile blood pressure    diphenhydrAMINE (BENADRYL) 25 MG tablet; Take 1 tablet by mouth every 8 hours as needed for Itching    Vitamin D, Ergocalciferol, 40660 units CAPS; Take 50,000 Units by mouth once a week for 3 doses    B-Complex CAPS; Take 1 capsule by mouth daily    Nondisplaced fracture of medial condyle of left femur, subsequent encounter for closed fracture with malunion   Follow w Ortho    Orders:    lidocaine 4 % external patch; Place 1 patch onto the skin daily    HYDROcodone-acetaminophen (NORCO) 5-325 MG per tablet; Take 1 tablet by mouth every 6 hours as needed for Pain for up to 7 days. Max Daily Amount: 4 tablets    Tobacco use disorder     Orders:    nicotine (NICODERM CQ) 21 MG/24HR; Place 1 patch onto the skin every 24 hours    Gastroesophageal reflux disease without esophagitis   Chronic, at goal (stable), continue current treatment plan    Orders:    pantoprazole (PROTONIX) 40 MG tablet; Take 1 tablet by mouth every morning (before breakfast)    Chronic systolic (congestive) heart failure   Chronic, at goal (stable), continue current treatment plan    Orders:    metoprolol tartrate (LOPRESSOR) 25 MG tablet; Take 0.5 tablets by mouth

## 2025-06-26 ENCOUNTER — CLINICAL DOCUMENTATION (OUTPATIENT)
Age: 40
End: 2025-06-26

## 2025-06-26 VITALS
DIASTOLIC BLOOD PRESSURE: 75 MMHG | RESPIRATION RATE: 22 BRPM | SYSTOLIC BLOOD PRESSURE: 158 MMHG | BODY MASS INDEX: 26.21 KG/M2 | OXYGEN SATURATION: 100 % | WEIGHT: 130 LBS | HEIGHT: 59 IN | TEMPERATURE: 99 F | HEART RATE: 102 BPM

## 2025-06-26 LAB
ABO/RH: NORMAL
ANTIBODY SCREEN: NEGATIVE
ARM BAND NUMBER: NORMAL
BLOOD BANK BLOOD PRODUCT EXPIRATION DATE: NORMAL
BLOOD BANK DISPENSE STATUS: NORMAL
BLOOD BANK ISBT PRODUCT BLOOD TYPE: 5100
BLOOD BANK PRODUCT CODE: NORMAL
BLOOD BANK SAMPLE EXPIRATION: NORMAL
BLOOD BANK UNIT TYPE AND RH: NORMAL
BPU ID: NORMAL
COMPONENT: NORMAL
CROSSMATCH RESULT: NORMAL
EKG ATRIAL RATE: 103 BPM
EKG P AXIS: 45 DEGREES
EKG P-R INTERVAL: 130 MS
EKG Q-T INTERVAL: 350 MS
EKG QRS DURATION: 74 MS
EKG QTC CALCULATION (BAZETT): 458 MS
EKG R AXIS: 6 DEGREES
EKG T AXIS: 119 DEGREES
EKG VENTRICULAR RATE: 103 BPM
GLUCOSE BLD-MCNC: 356 MG/DL (ref 74–99)
GLUCOSE BLD-MCNC: 451 MG/DL (ref 74–99)
GLUCOSE BLD-MCNC: 480 MG/DL (ref 74–99)
GLUCOSE BLD-MCNC: >500 MG/DL (ref 74–99)
GLUCOSE BLD-MCNC: >500 MG/DL (ref 74–99)
GLUCOSE SERPL-MCNC: 599 MG/DL (ref 74–99)
GLUCOSE SERPL-MCNC: 681 MG/DL (ref 74–99)
TRANSFUSION STATUS: NORMAL
UNIT DIVISION: 0
UNIT ISSUE DATE/TIME: NORMAL

## 2025-06-26 PROCEDURE — P9016 RBC LEUKOCYTES REDUCED: HCPCS

## 2025-06-26 PROCEDURE — 6370000000 HC RX 637 (ALT 250 FOR IP): Performed by: EMERGENCY MEDICINE

## 2025-06-26 PROCEDURE — 2580000003 HC RX 258: Performed by: EMERGENCY MEDICINE

## 2025-06-26 PROCEDURE — 96372 THER/PROPH/DIAG INJ SC/IM: CPT

## 2025-06-26 PROCEDURE — 93010 ELECTROCARDIOGRAM REPORT: CPT | Performed by: INTERNAL MEDICINE

## 2025-06-26 PROCEDURE — 93005 ELECTROCARDIOGRAM TRACING: CPT | Performed by: EMERGENCY MEDICINE

## 2025-06-26 PROCEDURE — 96375 TX/PRO/DX INJ NEW DRUG ADDON: CPT

## 2025-06-26 PROCEDURE — 96365 THER/PROPH/DIAG IV INF INIT: CPT

## 2025-06-26 PROCEDURE — 36430 TRANSFUSION BLD/BLD COMPNT: CPT

## 2025-06-26 PROCEDURE — 82962 GLUCOSE BLOOD TEST: CPT

## 2025-06-26 PROCEDURE — 82947 ASSAY GLUCOSE BLOOD QUANT: CPT

## 2025-06-26 RX ORDER — 0.9 % SODIUM CHLORIDE 0.9 %
250 INTRAVENOUS SOLUTION INTRAVENOUS ONCE
Status: COMPLETED | OUTPATIENT
Start: 2025-06-26 | End: 2025-06-26

## 2025-06-26 RX ADMIN — INSULIN HUMAN 9 UNITS: 100 INJECTION, SOLUTION PARENTERAL at 01:13

## 2025-06-26 RX ADMIN — INSULIN HUMAN 10 UNITS: 100 INJECTION, SOLUTION PARENTERAL at 03:31

## 2025-06-26 RX ADMIN — SODIUM CHLORIDE 250 ML: 0.9 INJECTION, SOLUTION INTRAVENOUS at 03:12

## 2025-06-26 RX ADMIN — INSULIN HUMAN 5 UNITS: 100 INJECTION, SOLUTION PARENTERAL at 05:22

## 2025-06-26 ASSESSMENT — PAIN DESCRIPTION - ORIENTATION: ORIENTATION: LEFT

## 2025-06-26 ASSESSMENT — PAIN DESCRIPTION - LOCATION: LOCATION: KNEE

## 2025-06-26 ASSESSMENT — PAIN SCALES - GENERAL: PAINLEVEL_OUTOF10: 9

## 2025-06-26 NOTE — ED PROVIDER NOTES
HPI:  6/25/25, Time: 8:25 PM EDT         Michelle ORDONEZ Lencho Nettles is a 40 y.o. female  has a past medical history of JOLLY (acute kidney injury), Anemia, AVF (arteriovenous fistula), Cellulitis, face, CHF (congestive heart failure) ([facility]), Chronic kidney disease, Chronic respiratory failure with hypoxia ([facility]), Dialysis AV fistula malfunction, initial encounter, Displaced trimalleolar fracture of left lower leg, closed, initial encounter, DM type 1 (diabetes mellitus, type 1) ([facility]), Encounter regarding vascular access for dialysis for ESRD ([facility]), ESRD (end stage renal disease) ([facility]), Hemodialysis patient, Hidradenitis suppurativa, Hypercalcemia, Hyperkalemia, Hypertension, Iron deficiency anemia secondary to blood loss (chronic), Other acute gastritis without hemorrhage, Tobacco abuse, Type 2 diabetes mellitus with hyperglycemia ([facility]), and Vitamin B-complex deficiency.presenting to the ED for low hemoglobin, beginning 1 day ago.  The complaint has been persistent, moderate in severity, and worsened by nothing.  Patient presents here because of low hemoglobin.  Patient reports her hemoglobin 6.1.  Patient was told to come to the emergency department.  Patient reports ongoing abdominal pain reports she was recently admitted and had pancreatitis.  Patient reporting no new chest pain she reports no difficulty breathing at present time she is on oxygen at home she did have dialysis yesterday.  She reports no black or tarry stools or hematemesis    ROS:   Pertinent positives and negatives are stated within HPI, all other systems reviewed and are negative.  --------------------------------------------- PAST HISTORY ---------------------------------------------  Past Medical History:  has a past medical history of JOLLY (acute kidney injury), Anemia, AVF (arteriovenous fistula), Cellulitis, face, CHF (congestive heart failure) ([facility]), Chronic kidney disease, Chronic respiratory failure with hypoxia ([facility]), Dialysis AV fistula

## 2025-06-26 NOTE — ED NOTES
[facility] called and set up at approximately 0450 am. Approximate time was 90 minutes.called at 0653 and they are in route- eta is approximately 0730.

## 2025-06-30 PROBLEM — K21.9 GASTROESOPHAGEAL REFLUX DISEASE WITHOUT ESOPHAGITIS: Status: ACTIVE | Noted: 2025-06-30

## 2025-06-30 PROBLEM — L70.0 ACNE VULGARIS: Status: ACTIVE | Noted: 2025-06-30

## 2025-06-30 ASSESSMENT — ENCOUNTER SYMPTOMS
CHEST TIGHTNESS: 0
SHORTNESS OF BREATH: 0
WHEEZING: 0
COUGH: 0
GASTROINTESTINAL NEGATIVE: 1

## 2025-06-30 NOTE — ASSESSMENT & PLAN NOTE
Orders:    Blood Pressure Monitoring (BLOOD PRESSURE CUFF) MISC; Dx:  Hypertension with labile blood pressure    diphenhydrAMINE (BENADRYL) 25 MG tablet; Take 1 tablet by mouth every 8 hours as needed for Itching    Vitamin D, Ergocalciferol, 75359 units CAPS; Take 50,000 Units by mouth once a week for 3 doses    B-Complex CAPS; Take 1 capsule by mouth daily

## 2025-06-30 NOTE — ASSESSMENT & PLAN NOTE
Orders:    Clindamycin Phosphate (CLINDAMYCIN PHOS, TWICE-DAILY,) 1 % GEL; Apply 1 application  topically daily

## 2025-06-30 NOTE — ASSESSMENT & PLAN NOTE
Chronic, at goal (stable), continue current treatment plan    Orders:    metoprolol tartrate (LOPRESSOR) 25 MG tablet; Take 0.5 tablets by mouth 2 times daily

## 2025-07-01 ENCOUNTER — HOSPITAL ENCOUNTER (INPATIENT)
Age: 40
LOS: 5 days | Discharge: HOME | DRG: 637 | End: 2025-07-06
Attending: EMERGENCY MEDICINE | Admitting: INTERNAL MEDICINE
Payer: MEDICARE

## 2025-07-01 ENCOUNTER — APPOINTMENT (OUTPATIENT)
Dept: GENERAL RADIOLOGY | Age: 40
DRG: 637 | End: 2025-07-01
Payer: MEDICARE

## 2025-07-01 DIAGNOSIS — R79.89 ELEVATED LACTIC ACID LEVEL: ICD-10-CM

## 2025-07-01 DIAGNOSIS — S82.852A TRIMALLEOLAR FRACTURE OF ANKLE, CLOSED, LEFT, INITIAL ENCOUNTER: ICD-10-CM

## 2025-07-01 DIAGNOSIS — R11.2 NAUSEA AND VOMITING, UNSPECIFIED VOMITING TYPE: ICD-10-CM

## 2025-07-01 DIAGNOSIS — N18.6 ESRD ON HEMODIALYSIS (HCC): ICD-10-CM

## 2025-07-01 DIAGNOSIS — S72.435P: ICD-10-CM

## 2025-07-01 DIAGNOSIS — E87.29 HIGH ANION GAP METABOLIC ACIDOSIS: ICD-10-CM

## 2025-07-01 DIAGNOSIS — E10.11 DIABETIC KETOACIDOSIS WITH COMA ASSOCIATED WITH TYPE 1 DIABETES MELLITUS (HCC): Primary | ICD-10-CM

## 2025-07-01 DIAGNOSIS — Z99.2 ESRD ON HEMODIALYSIS (HCC): ICD-10-CM

## 2025-07-01 DIAGNOSIS — E87.70 HYPERVOLEMIA, UNSPECIFIED HYPERVOLEMIA TYPE: ICD-10-CM

## 2025-07-01 DIAGNOSIS — Z99.81 SUPPLEMENTAL OXYGEN DEPENDENT: ICD-10-CM

## 2025-07-01 PROBLEM — E10.10 DKA, TYPE 1, NOT AT GOAL (HCC): Status: ACTIVE | Noted: 2025-07-01

## 2025-07-01 LAB
ALBUMIN SERPL-MCNC: 3.7 G/DL (ref 3.5–5.2)
ALP SERPL-CCNC: 163 U/L (ref 35–104)
ALT SERPL-CCNC: 15 U/L (ref 0–35)
ANION GAP SERPL CALCULATED.3IONS-SCNC: 12 MMOL/L (ref 7–16)
ANION GAP SERPL CALCULATED.3IONS-SCNC: 29 MMOL/L (ref 7–16)
AST SERPL-CCNC: 22 U/L (ref 0–35)
B PARAP IS1001 DNA NPH QL NAA+NON-PROBE: NOT DETECTED
B PERT DNA SPEC QL NAA+PROBE: NOT DETECTED
B-OH-BUTYR SERPL-MCNC: 2.94 MMOL/L (ref 0.02–0.27)
B.E.: -7.4 MMOL/L (ref -3–3)
BASOPHILS # BLD: 0.03 K/UL (ref 0–0.2)
BASOPHILS NFR BLD: 0 % (ref 0–2)
BILIRUB SERPL-MCNC: 0.3 MG/DL (ref 0–1.2)
BNP SERPL-MCNC: ABNORMAL PG/ML (ref 0–125)
BUN BLD-MCNC: 29 MG/DL (ref 6–20)
BUN SERPL-MCNC: 15 MG/DL (ref 6–20)
BUN SERPL-MCNC: 31 MG/DL (ref 6–20)
C PNEUM DNA NPH QL NAA+NON-PROBE: NOT DETECTED
CALCIUM SERPL-MCNC: 8.4 MG/DL (ref 8.6–10)
CALCIUM SERPL-MCNC: 9.3 MG/DL (ref 8.6–10)
CHLORIDE BLD-SCNC: 96 MMOL/L (ref 100–108)
CHLORIDE SERPL-SCNC: 88 MMOL/L (ref 98–107)
CHLORIDE SERPL-SCNC: 96 MMOL/L (ref 98–107)
CO2 BLD CALC-SCNC: 18 MMOL/L (ref 22–29)
CO2 SERPL-SCNC: 16 MMOL/L (ref 22–29)
CO2 SERPL-SCNC: 26 MMOL/L (ref 22–29)
COHB: 1.2 % (ref 0–1.5)
CREAT BLD-MCNC: 10.2 MG/DL (ref 0.5–1)
CREAT SERPL-MCNC: 5.1 MG/DL (ref 0.5–1)
CREAT SERPL-MCNC: 9.9 MG/DL (ref 0.5–1)
CRITICAL ACTION: NORMAL
CRITICAL NOTIFICATION DATE/TIME: NORMAL
CRITICAL NOTIFICATION: NORMAL
CRITICAL: ABNORMAL
DATE ANALYZED: ABNORMAL
DATE OF COLLECTION: ABNORMAL
EGFR, POC: 4 ML/MIN/1.73M2
EKG ATRIAL RATE: 117 BPM
EKG P AXIS: 74 DEGREES
EKG P-R INTERVAL: 124 MS
EKG Q-T INTERVAL: 328 MS
EKG QRS DURATION: 66 MS
EKG QTC CALCULATION (BAZETT): 457 MS
EKG R AXIS: 35 DEGREES
EKG T AXIS: 70 DEGREES
EKG VENTRICULAR RATE: 117 BPM
EOSINOPHIL # BLD: 0.04 K/UL (ref 0.05–0.5)
EOSINOPHILS RELATIVE PERCENT: 1 % (ref 0–6)
ERYTHROCYTE [DISTWIDTH] IN BLOOD BY AUTOMATED COUNT: 15.9 % (ref 11.5–15)
FLUAV RNA NPH QL NAA+NON-PROBE: NOT DETECTED
FLUBV RNA NPH QL NAA+NON-PROBE: NOT DETECTED
GFR, ESTIMATED: 10 ML/MIN/1.73M2
GFR, ESTIMATED: 5 ML/MIN/1.73M2
GLUCOSE BLD-MCNC: 113 MG/DL (ref 74–99)
GLUCOSE BLD-MCNC: 124 MG/DL (ref 74–99)
GLUCOSE BLD-MCNC: 131 MG/DL (ref 74–99)
GLUCOSE BLD-MCNC: 181 MG/DL (ref 74–99)
GLUCOSE BLD-MCNC: 223 MG/DL (ref 74–99)
GLUCOSE BLD-MCNC: 247 MG/DL (ref 74–99)
GLUCOSE BLD-MCNC: 316 MG/DL (ref 74–99)
GLUCOSE BLD-MCNC: >500 MG/DL (ref 74–99)
GLUCOSE BLD-MCNC: >700 MG/DL (ref 74–99)
GLUCOSE SERPL-MCNC: 119 MG/DL (ref 74–99)
GLUCOSE SERPL-MCNC: 629 MG/DL (ref 74–99)
HADV DNA NPH QL NAA+NON-PROBE: NOT DETECTED
HCO3: 17.4 MMOL/L (ref 22–26)
HCOV 229E RNA NPH QL NAA+NON-PROBE: NOT DETECTED
HCOV HKU1 RNA NPH QL NAA+NON-PROBE: NOT DETECTED
HCOV NL63 RNA NPH QL NAA+NON-PROBE: NOT DETECTED
HCOV OC43 RNA NPH QL NAA+NON-PROBE: NOT DETECTED
HCT VFR BLD AUTO: 21.6 % (ref 34–48)
HGB BLD-MCNC: 7.3 G/DL (ref 11.5–15.5)
HHB: 8.2 % (ref 0–5)
HMPV RNA NPH QL NAA+NON-PROBE: NOT DETECTED
HPIV1 RNA NPH QL NAA+NON-PROBE: NOT DETECTED
HPIV2 RNA NPH QL NAA+NON-PROBE: NOT DETECTED
HPIV3 RNA NPH QL NAA+NON-PROBE: NOT DETECTED
HPIV4 RNA NPH QL NAA+NON-PROBE: NOT DETECTED
IMM GRANULOCYTES # BLD AUTO: 0.03 K/UL (ref 0–0.58)
IMM GRANULOCYTES NFR BLD: 0 % (ref 0–5)
LAB: ABNORMAL
LACTATE BLDV-SCNC: 2.7 MMOL/L (ref 0.5–1.9)
LACTATE BLDV-SCNC: 6.6 MMOL/L (ref 0.5–1.9)
LACTATE BLDV-SCNC: 7.4 MMOL/L (ref 0.5–2.2)
LIPASE SERPL-CCNC: 39 U/L (ref 13–60)
LYMPHOCYTES NFR BLD: 0.64 K/UL (ref 1.5–4)
LYMPHOCYTES RELATIVE PERCENT: 8 % (ref 20–42)
Lab: 1342
M PNEUMO DNA NPH QL NAA+NON-PROBE: NOT DETECTED
MAGNESIUM SERPL-MCNC: 1.9 MG/DL (ref 1.6–2.6)
MAGNESIUM SERPL-MCNC: 2.2 MG/DL (ref 1.6–2.6)
MCH RBC QN AUTO: 29.9 PG (ref 26–35)
MCHC RBC AUTO-ENTMCNC: 33.8 G/DL (ref 32–34.5)
MCV RBC AUTO: 88.5 FL (ref 80–99.9)
METHB: 0.7 % (ref 0–1.5)
MODE: ABNORMAL
MONOCYTES NFR BLD: 0.54 K/UL (ref 0.1–0.95)
MONOCYTES NFR BLD: 7 % (ref 2–12)
NEUTROPHILS NFR BLD: 84 % (ref 43–80)
NEUTS SEG NFR BLD: 6.55 K/UL (ref 1.8–7.3)
O2 SATURATION: 91.6 % (ref 92–98.5)
O2HB: 89.9 % (ref 94–97)
OPERATOR ID: 1741
PATIENT TEMP: 37 C
PCO2: 31.8 MMHG (ref 35–45)
PH BLOOD GAS: 7.36 (ref 7.35–7.45)
PHOSPHATE SERPL-MCNC: 2.3 MG/DL (ref 2.5–4.5)
PHOSPHATE SERPL-MCNC: 3.6 MG/DL (ref 2.5–4.5)
PLATELET # BLD AUTO: 216 K/UL (ref 130–450)
PMV BLD AUTO: 9.5 FL (ref 7–12)
PO2: 68 MMHG (ref 75–100)
POC ANION GAP: 17 MMOL/L (ref 7–16)
POTASSIUM BLD-SCNC: 3.1 MMOL/L (ref 3.5–5)
POTASSIUM SERPL-SCNC: 3.4 MMOL/L (ref 3.5–5.1)
POTASSIUM SERPL-SCNC: 3.6 MMOL/L (ref 3.5–5.1)
PROCALCITONIN SERPL-MCNC: 2.03 NG/ML (ref 0–0.08)
PROT SERPL-MCNC: 7.7 G/DL (ref 6.4–8.3)
RBC # BLD AUTO: 2.44 M/UL (ref 3.5–5.5)
RSV RNA NPH QL NAA+NON-PROBE: NOT DETECTED
RV+EV RNA NPH QL NAA+NON-PROBE: NOT DETECTED
SARS-COV-2 RNA NPH QL NAA+NON-PROBE: NOT DETECTED
SODIUM BLD-SCNC: 131 MMOL/L (ref 132–146)
SODIUM SERPL-SCNC: 133 MMOL/L (ref 136–145)
SODIUM SERPL-SCNC: 134 MMOL/L (ref 136–145)
SOURCE, BLOOD GAS: ABNORMAL
SPECIMEN DESCRIPTION: NORMAL
THB: 7.4 G/DL (ref 11.5–16.5)
TIME ANALYZED: 1353
TROPONIN I SERPL HS-MCNC: 174 NG/L (ref 0–14)
WBC OTHER # BLD: 7.8 K/UL (ref 4.5–11.5)

## 2025-07-01 PROCEDURE — 80051 ELECTROLYTE PANEL: CPT

## 2025-07-01 PROCEDURE — 82805 BLOOD GASES W/O2 SATURATION: CPT

## 2025-07-01 PROCEDURE — 6360000002 HC RX W HCPCS: Performed by: EMERGENCY MEDICINE

## 2025-07-01 PROCEDURE — 93005 ELECTROCARDIOGRAM TRACING: CPT | Performed by: EMERGENCY MEDICINE

## 2025-07-01 PROCEDURE — 2580000003 HC RX 258: Performed by: INTERNAL MEDICINE

## 2025-07-01 PROCEDURE — 36415 COLL VENOUS BLD VENIPUNCTURE: CPT

## 2025-07-01 PROCEDURE — 96361 HYDRATE IV INFUSION ADD-ON: CPT

## 2025-07-01 PROCEDURE — 6360000002 HC RX W HCPCS: Performed by: INTERNAL MEDICINE

## 2025-07-01 PROCEDURE — 2580000003 HC RX 258: Performed by: EMERGENCY MEDICINE

## 2025-07-01 PROCEDURE — 82962 GLUCOSE BLOOD TEST: CPT

## 2025-07-01 PROCEDURE — 83880 ASSAY OF NATRIURETIC PEPTIDE: CPT

## 2025-07-01 PROCEDURE — 83605 ASSAY OF LACTIC ACID: CPT

## 2025-07-01 PROCEDURE — 99223 1ST HOSP IP/OBS HIGH 75: CPT | Performed by: INTERNAL MEDICINE

## 2025-07-01 PROCEDURE — 71045 X-RAY EXAM CHEST 1 VIEW: CPT

## 2025-07-01 PROCEDURE — 84145 PROCALCITONIN (PCT): CPT

## 2025-07-01 PROCEDURE — 85025 COMPLETE CBC W/AUTO DIFF WBC: CPT

## 2025-07-01 PROCEDURE — 84100 ASSAY OF PHOSPHORUS: CPT

## 2025-07-01 PROCEDURE — 90935 HEMODIALYSIS ONE EVALUATION: CPT

## 2025-07-01 PROCEDURE — 2500000003 HC RX 250 WO HCPCS: Performed by: EMERGENCY MEDICINE

## 2025-07-01 PROCEDURE — 82010 KETONE BODYS QUAN: CPT

## 2025-07-01 PROCEDURE — 87040 BLOOD CULTURE FOR BACTERIA: CPT

## 2025-07-01 PROCEDURE — 0202U NFCT DS 22 TRGT SARS-COV-2: CPT

## 2025-07-01 PROCEDURE — 93010 ELECTROCARDIOGRAM REPORT: CPT | Performed by: INTERNAL MEDICINE

## 2025-07-01 PROCEDURE — 83690 ASSAY OF LIPASE: CPT

## 2025-07-01 PROCEDURE — 6370000000 HC RX 637 (ALT 250 FOR IP): Performed by: EMERGENCY MEDICINE

## 2025-07-01 PROCEDURE — 80053 COMPREHEN METABOLIC PANEL: CPT

## 2025-07-01 PROCEDURE — 84520 ASSAY OF UREA NITROGEN: CPT

## 2025-07-01 PROCEDURE — 2000000000 HC ICU R&B

## 2025-07-01 PROCEDURE — 2700000000 HC OXYGEN THERAPY PER DAY

## 2025-07-01 PROCEDURE — 80048 BASIC METABOLIC PNL TOTAL CA: CPT

## 2025-07-01 PROCEDURE — 5A1D70Z PERFORMANCE OF URINARY FILTRATION, INTERMITTENT, LESS THAN 6 HOURS PER DAY: ICD-10-PCS | Performed by: INTERNAL MEDICINE

## 2025-07-01 PROCEDURE — 96375 TX/PRO/DX INJ NEW DRUG ADDON: CPT

## 2025-07-01 PROCEDURE — 83735 ASSAY OF MAGNESIUM: CPT

## 2025-07-01 PROCEDURE — 96374 THER/PROPH/DIAG INJ IV PUSH: CPT

## 2025-07-01 PROCEDURE — 6360000002 HC RX W HCPCS

## 2025-07-01 PROCEDURE — 99285 EMERGENCY DEPT VISIT HI MDM: CPT

## 2025-07-01 PROCEDURE — 84484 ASSAY OF TROPONIN QUANT: CPT

## 2025-07-01 PROCEDURE — 2500000003 HC RX 250 WO HCPCS: Performed by: INTERNAL MEDICINE

## 2025-07-01 PROCEDURE — 82565 ASSAY OF CREATININE: CPT

## 2025-07-01 PROCEDURE — 82947 ASSAY GLUCOSE BLOOD QUANT: CPT

## 2025-07-01 RX ORDER — MAGNESIUM SULFATE IN WATER 40 MG/ML
2000 INJECTION, SOLUTION INTRAVENOUS PRN
Status: DISCONTINUED | OUTPATIENT
Start: 2025-07-01 | End: 2025-07-06 | Stop reason: HOSPADM

## 2025-07-01 RX ORDER — POTASSIUM CHLORIDE 7.45 MG/ML
10 INJECTION INTRAVENOUS PRN
Status: DISPENSED | OUTPATIENT
Start: 2025-07-01 | End: 2025-07-04

## 2025-07-01 RX ORDER — MAGNESIUM SULFATE IN WATER 40 MG/ML
2000 INJECTION, SOLUTION INTRAVENOUS PRN
Status: DISCONTINUED | OUTPATIENT
Start: 2025-07-01 | End: 2025-07-01 | Stop reason: SDUPTHER

## 2025-07-01 RX ORDER — 0.9 % SODIUM CHLORIDE 0.9 %
15 INTRAVENOUS SOLUTION INTRAVENOUS ONCE
Status: COMPLETED | OUTPATIENT
Start: 2025-07-01 | End: 2025-07-01

## 2025-07-01 RX ORDER — DEXTROSE MONOHYDRATE, SODIUM CHLORIDE, AND POTASSIUM CHLORIDE 50; 1.49; 4.5 G/1000ML; G/1000ML; G/1000ML
INJECTION, SOLUTION INTRAVENOUS CONTINUOUS PRN
Status: DISCONTINUED | OUTPATIENT
Start: 2025-07-01 | End: 2025-07-06 | Stop reason: HOSPADM

## 2025-07-01 RX ORDER — ONDANSETRON 2 MG/ML
4 INJECTION INTRAMUSCULAR; INTRAVENOUS ONCE
Status: COMPLETED | OUTPATIENT
Start: 2025-07-01 | End: 2025-07-01

## 2025-07-01 RX ORDER — POTASSIUM CHLORIDE 7.45 MG/ML
10 INJECTION INTRAVENOUS PRN
Status: DISCONTINUED | OUTPATIENT
Start: 2025-07-01 | End: 2025-07-01 | Stop reason: SDUPTHER

## 2025-07-01 RX ORDER — DIPHENHYDRAMINE HYDROCHLORIDE 50 MG/ML
25 INJECTION, SOLUTION INTRAMUSCULAR; INTRAVENOUS ONCE
Status: COMPLETED | OUTPATIENT
Start: 2025-07-01 | End: 2025-07-01

## 2025-07-01 RX ORDER — 0.9 % SODIUM CHLORIDE 0.9 %
500 INTRAVENOUS SOLUTION INTRAVENOUS ONCE
Status: COMPLETED | OUTPATIENT
Start: 2025-07-01 | End: 2025-07-01

## 2025-07-01 RX ORDER — SODIUM CHLORIDE 450 MG/100ML
INJECTION, SOLUTION INTRAVENOUS CONTINUOUS
Status: DISCONTINUED | OUTPATIENT
Start: 2025-07-01 | End: 2025-07-06 | Stop reason: HOSPADM

## 2025-07-01 RX ORDER — SODIUM CHLORIDE 9 MG/ML
INJECTION, SOLUTION INTRAVENOUS CONTINUOUS
Status: DISCONTINUED | OUTPATIENT
Start: 2025-07-01 | End: 2025-07-06 | Stop reason: HOSPADM

## 2025-07-01 RX ORDER — METOCLOPRAMIDE HYDROCHLORIDE 5 MG/ML
5 INJECTION INTRAMUSCULAR; INTRAVENOUS ONCE
Status: COMPLETED | OUTPATIENT
Start: 2025-07-01 | End: 2025-07-01

## 2025-07-01 RX ORDER — HYDRALAZINE HYDROCHLORIDE 20 MG/ML
10 INJECTION INTRAMUSCULAR; INTRAVENOUS EVERY 6 HOURS PRN
Status: DISCONTINUED | OUTPATIENT
Start: 2025-07-01 | End: 2025-07-06 | Stop reason: HOSPADM

## 2025-07-01 RX ORDER — LABETALOL HYDROCHLORIDE 5 MG/ML
10 INJECTION, SOLUTION INTRAVENOUS EVERY 6 HOURS PRN
Status: DISCONTINUED | OUTPATIENT
Start: 2025-07-01 | End: 2025-07-06 | Stop reason: HOSPADM

## 2025-07-01 RX ORDER — DEXTROSE MONOHYDRATE, SODIUM CHLORIDE, AND POTASSIUM CHLORIDE 50; 1.49; 4.5 G/1000ML; G/1000ML; G/1000ML
INJECTION, SOLUTION INTRAVENOUS CONTINUOUS PRN
Status: DISCONTINUED | OUTPATIENT
Start: 2025-07-01 | End: 2025-07-01 | Stop reason: SDUPTHER

## 2025-07-01 RX ADMIN — WATER 1000 MG: 1 INJECTION INTRAMUSCULAR; INTRAVENOUS; SUBCUTANEOUS at 14:32

## 2025-07-01 RX ADMIN — DEXTROSE, SODIUM CHLORIDE, AND POTASSIUM CHLORIDE: 5; .45; .15 INJECTION INTRAVENOUS at 17:44

## 2025-07-01 RX ADMIN — SODIUM CHLORIDE 500 ML: 0.9 INJECTION, SOLUTION INTRAVENOUS at 13:19

## 2025-07-01 RX ADMIN — POTASSIUM CHLORIDE 10 MEQ: 10 INJECTION, SOLUTION INTRAVENOUS at 14:49

## 2025-07-01 RX ADMIN — POTASSIUM CHLORIDE 10 MEQ: 10 INJECTION, SOLUTION INTRAVENOUS at 16:45

## 2025-07-01 RX ADMIN — POTASSIUM CHLORIDE 10 MEQ: 10 INJECTION, SOLUTION INTRAVENOUS at 21:45

## 2025-07-01 RX ADMIN — SODIUM CHLORIDE: 0.45 INJECTION, SOLUTION INTRAVENOUS at 14:50

## 2025-07-01 RX ADMIN — DIPHENHYDRAMINE HYDROCHLORIDE 25 MG: 50 INJECTION INTRAMUSCULAR; INTRAVENOUS at 15:42

## 2025-07-01 RX ADMIN — POTASSIUM CHLORIDE 10 MEQ: 10 INJECTION, SOLUTION INTRAVENOUS at 23:59

## 2025-07-01 RX ADMIN — SODIUM CHLORIDE 885 ML: 0.9 INJECTION, SOLUTION INTRAVENOUS at 15:41

## 2025-07-01 RX ADMIN — SODIUM PHOSPHATE, MONOBASIC, MONOHYDRATE AND SODIUM PHOSPHATE, DIBASIC, ANHYDROUS 15 MMOL: 142; 276 INJECTION, SOLUTION INTRAVENOUS at 22:22

## 2025-07-01 RX ADMIN — HYDROMORPHONE HYDROCHLORIDE 0.5 MG: 1 INJECTION, SOLUTION INTRAMUSCULAR; INTRAVENOUS; SUBCUTANEOUS at 15:42

## 2025-07-01 RX ADMIN — SODIUM CHLORIDE 10.8 UNITS/HR: 9 INJECTION, SOLUTION INTRAVENOUS at 14:51

## 2025-07-01 RX ADMIN — POTASSIUM CHLORIDE 10 MEQ: 10 INJECTION, SOLUTION INTRAVENOUS at 17:51

## 2025-07-01 RX ADMIN — HYDRALAZINE HYDROCHLORIDE 10 MG: 20 INJECTION INTRAMUSCULAR; INTRAVENOUS at 17:12

## 2025-07-01 RX ADMIN — METOCLOPRAMIDE 5 MG: 5 INJECTION, SOLUTION INTRAMUSCULAR; INTRAVENOUS at 13:19

## 2025-07-01 RX ADMIN — LABETALOL HYDROCHLORIDE 10 MG: 5 INJECTION INTRAVENOUS at 19:33

## 2025-07-01 RX ADMIN — ONDANSETRON 4 MG: 2 INJECTION, SOLUTION INTRAMUSCULAR; INTRAVENOUS at 15:42

## 2025-07-01 RX ADMIN — POTASSIUM CHLORIDE 10 MEQ: 10 INJECTION, SOLUTION INTRAVENOUS at 22:58

## 2025-07-01 RX ADMIN — POTASSIUM CHLORIDE 10 MEQ: 10 INJECTION, SOLUTION INTRAVENOUS at 15:44

## 2025-07-01 ASSESSMENT — PAIN DESCRIPTION - DESCRIPTORS
DESCRIPTORS: ACHING;CRAMPING
DESCRIPTORS: ACHING

## 2025-07-01 ASSESSMENT — PAIN SCALES - GENERAL
PAINLEVEL_OUTOF10: 0
PAINLEVEL_OUTOF10: 7
PAINLEVEL_OUTOF10: 8
PAINLEVEL_OUTOF10: 0

## 2025-07-01 ASSESSMENT — PAIN - FUNCTIONAL ASSESSMENT: PAIN_FUNCTIONAL_ASSESSMENT: 0-10

## 2025-07-01 ASSESSMENT — PAIN DESCRIPTION - LOCATION
LOCATION: KNEE
LOCATION: ABDOMEN

## 2025-07-01 ASSESSMENT — PAIN DESCRIPTION - ORIENTATION
ORIENTATION: LEFT
ORIENTATION: MID

## 2025-07-01 NOTE — ED PROVIDER NOTES
1303:  This EKG is signed and interpreted by Dr Nelson Gamez DO.    Rate: 117  Rhythm: Sinus  Interpretation: Sinus rhythm sinus tachycardia, motion artifact does limit interpretation, IN measured at 124, QRS 66 QTc is 457, does not meet STEMI criteria, nonspecific and change compared to prior  Comparison: changes compared to previous EKG          RADIOLOGY:   Non-plain film images such as CT, Ultrasound and MRI are read by the radiologist. Plain radiographic images are visualized and preliminarily interpreted by the ED Provider with the below findings:    This X-Ray is independently viewed and interpreted by me:   - Study: Chest X-Ray   - Number of Views: 1  - Findings: No cardiomegaly      Interpretation per the Radiologist below, if available at the time of this note:    XR CHEST PORTABLE   Final Result   Worsening increased interstitial markings throughout the lung fields   bilaterally.  The pleural effusions are slightly increased in size.           No results found.    No results found.    PROCEDURES   Unless otherwise noted below, none             PAST MEDICAL HISTORY/Chronic Conditions Affecting Care      has a past medical history of JOLLY (acute kidney injury) (04/05/2016), Anemia, AVF (arteriovenous fistula) (02/19/2018), Cellulitis, face, CHF (congestive heart failure) ([facility]), Chronic kidney disease, Chronic respiratory failure with hypoxia ([facility]), Dialysis AV fistula malfunction, initial encounter (11/07/2019), Displaced trimalleolar fracture of left lower leg, closed, initial encounter (02/22/2025), DM type 1 (diabetes mellitus, type 1) ([facility]), Encounter regarding vascular access for dialysis for ESRD ([facility]) (07/12/2018), ESRD (end stage renal disease) ([facility]), Hemodialysis patient, Hidradenitis suppurativa, Hypercalcemia, Hyperkalemia, Hypertension, Iron deficiency anemia secondary to blood loss (chronic), Other acute gastritis without hemorrhage, Tobacco abuse (04/05/2016), Type 2 diabetes mellitus with

## 2025-07-01 NOTE — CONSULTS
[facility]  Nephrology Consult Note    Patient's Name: Michelle Nettles    Reason for Consult:  DKA, missed dialysis     Chief Complaint: Shortness of breath    History of Present Illness:    A full consult is deferred as patient is well-known to our service.  Briefly, Michelle Nettles is a 40 y.o. female with a past medical history of CHF, ESRD, hypertension, and diabetes mellitus.  She presented to the ED on 7/1 reportedly for concerns of shortness of breath.  Vital signs on 7/1 includes temperature 98.9, respirations 28, pulse 120, /90.  Lab data on 7/1 include sodium 133, potassium 3.4, CO2 16, BUN 31, creatinine 9.9, anion gap 29, lactic acid 7.4, glucose 629, procalcitonin 2.03, troponin 174, proBNP 30,942, beta hydroxybutyrate 2.94, and hemoglobin 7.3.  Her respiratory panel was negative.  She had a chest x-ray on 7/1 which showed worsening increased interstitial markings throughout the lung fields bilaterally, pleural effusions are slightly increased in size.  She is for transfer to ICU.  Patient is known to our service and dialyzes as an outpatient at [facility].  At present, patient was seen and examined.  She is lethargic, breathing appears labored.    PMH:    Past Medical History:   Diagnosis Date    JOLLY (acute kidney injury) 04/05/2016    Anemia     AVF (arteriovenous fistula) 02/19/2018    let arm    Cellulitis, face     CHF (congestive heart failure) ([facility])     Chronic kidney disease     Chronic respiratory failure with hypoxia ([facility])     Dialysis AV fistula malfunction, initial encounter 11/07/2019    Displaced trimalleolar fracture of left lower leg, closed, initial encounter 02/22/2025    DM type 1 (diabetes mellitus, type 1) ([facility])     Encounter regarding vascular access for dialysis for ESRD ([facility]) 07/12/2018    ESRD (end stage renal disease) ([facility])     Hemodialysis patient     amrita dawson mon wed fri / graft in left arm    Hidradenitis suppurativa

## 2025-07-01 NOTE — PLAN OF CARE
Problem: Safety - Adult  Goal: Free from fall injury  Outcome: Progressing  Flowsheets (Taken 7/1/2025 1819)  Free From Fall Injury: Instruct family/caregiver on patient safety     Problem: ABCDS Injury Assessment  Goal: Absence of physical injury  Outcome: Progressing  Flowsheets (Taken 7/1/2025 1819)  Absence of Physical Injury: Implement safety measures based on patient assessment     Problem: Pain  Goal: Verbalizes/displays adequate comfort level or baseline comfort level  Flowsheets (Taken 7/1/2025 1819)  Verbalizes/displays adequate comfort level or baseline comfort level:   Encourage patient to monitor pain and request assistance   Assess pain using appropriate pain scale   Administer analgesics based on type and severity of pain and evaluate response   Implement non-pharmacological measures as appropriate and evaluate response   Notify Licensed Independent Practitioner if interventions unsuccessful or patient reports new pain   Consider cultural and social influences on pain and pain management

## 2025-07-01 NOTE — CONSULTS
Critical Care Admit/Consult Note         Patient - Michelle Nettles   MRN -  [id number]   [id number]   - 1985      Date of Admission -  2025 12:48 PM  Date of evaluation -  2025  10/10   Hospital Day - 0          ADMIT/CONSULT DETAILS     Reason for Admit/Consult   Lactic acidosis  Mild DKA  Acute hypoxic respiratory failure     Consulting Service/Physician   Consulting - Timur Ellington MD  Primary Care Physician - Rogers Rodríguez MD HPI   The patient is a 40 y.o. female with significant past medical history of ESRD on hemodialysis, pancreatitis, diabetes, anemia, nicotine dependence, who presented to the ED from dialysis with worsening nausea, vomiting, shortness of breath.  She did not complete her dialysis session.   She has frequent hospital admissions and was recently discharged on 25 after being treated for dka.  Vascular surgery evaluated her LUE fistula and it was deemed okay to use.  Her discharge summary insulin regimen is lantus 5U daily and sliding scale with meals.  Upon arrival here today her POC glucose was >700, CO2 16, AG 29, lactic acid 6.6, BHB 2.94, Hgb 7.3 which is near baseline.  CXR with extensive pulmonary edema.   She will be admitted to the ICU for ongoing care.  Nephrology was consulted from the ED.     Upon my assessment she was seen in the ED.  She is lethargic but will wake up and answer yes/no questions.  She does appear to have some degree of labored breathing.  She is on her baseline 5L oxygen with SpO2 98%.          Past Medical History         Diagnosis Date    JOLLY (acute kidney injury) 2016    Anemia     AVF (arteriovenous fistula) 2018    let arm    Cellulitis, face     CHF (congestive heart failure) ([facility])     Chronic kidney disease     Chronic respiratory failure with hypoxia ([facility])     Dialysis AV fistula malfunction, initial encounter 2019    Displaced trimalleolar fracture of left lower leg, closed, initial

## 2025-07-01 NOTE — H&P
[facility] History and Physical      CHIEF COMPLAINT:  DKA    History of Present Illness: This is 40-year-old -American female with past medical history of ESRD on dialysis, diabetes mellitus, hypertension, and anemia went for dialysis today.  She was drowsy at dialysis and they checked lab found to have high anion gap, high sugar sent here for evaluation and treatment.  Patient was not forthcoming with her history because she was drowsy.  She endorses she was using oxygen via nasal cannula 5 L at home.  Her vitals in ER blood pressure 184/90, pulse 111, respiration 22 and temperature 98.9 °F.  Lactic acid level 6.6, sodium 133, potassium 3.4 and bicarb 16 and BUN 31 creatinine 9.9 anion gap 29.    Informant(s) for H&P: ER physician and medical record    REVIEW OF SYSTEMS:  A comprehensive review of systems was negative except for: what is in the HPI      PMH:  Past Medical History:   Diagnosis Date    JOLLY (acute kidney injury) 04/05/2016    Anemia     AVF (arteriovenous fistula) 02/19/2018    let arm    Cellulitis, face     CHF (congestive heart failure) ([facility])     Chronic kidney disease     Chronic respiratory failure with hypoxia ([facility])     Dialysis AV fistula malfunction, initial encounter 11/07/2019    Displaced trimalleolar fracture of left lower leg, closed, initial encounter 02/22/2025    DM type 1 (diabetes mellitus, type 1) ([facility])     Encounter regarding vascular access for dialysis for ESRD ([facility]) 07/12/2018    ESRD (end stage renal disease) ([facility])     Hemodialysis patient     amrita dawson mon wed fri / graft in left arm    Hidradenitis suppurativa     Hypercalcemia     Hyperkalemia     Hypertension     Iron deficiency anemia secondary to blood loss (chronic)     Other acute gastritis without hemorrhage     Tobacco abuse 04/05/2016    Type 2 diabetes mellitus with hyperglycemia ([facility])     Vitamin B-complex deficiency        Surgical History:  Past Surgical History:   Procedure

## 2025-07-02 ENCOUNTER — APPOINTMENT (OUTPATIENT)
Dept: GENERAL RADIOLOGY | Age: 40
DRG: 637 | End: 2025-07-02
Payer: MEDICARE

## 2025-07-02 ENCOUNTER — HOSPITAL ENCOUNTER (INPATIENT)
Dept: GENERAL RADIOLOGY | Age: 40
Discharge: HOME | DRG: 637 | End: 2025-07-04
Payer: MEDICARE

## 2025-07-02 DIAGNOSIS — S72.432A: ICD-10-CM

## 2025-07-02 LAB
ANION GAP SERPL CALCULATED.3IONS-SCNC: 10 MMOL/L (ref 7–16)
ANION GAP SERPL CALCULATED.3IONS-SCNC: 13 MMOL/L (ref 7–16)
ANION GAP SERPL CALCULATED.3IONS-SCNC: 13 MMOL/L (ref 7–16)
BUN SERPL-MCNC: 15 MG/DL (ref 6–20)
BUN SERPL-MCNC: 16 MG/DL (ref 6–20)
BUN SERPL-MCNC: 17 MG/DL (ref 6–20)
CALCIUM SERPL-MCNC: 8.1 MG/DL (ref 8.6–10)
CALCIUM SERPL-MCNC: 8.3 MG/DL (ref 8.6–10)
CALCIUM SERPL-MCNC: 8.7 MG/DL (ref 8.6–10)
CHLORIDE SERPL-SCNC: 96 MMOL/L (ref 98–107)
CHLORIDE SERPL-SCNC: 98 MMOL/L (ref 98–107)
CHLORIDE SERPL-SCNC: 98 MMOL/L (ref 98–107)
CO2 SERPL-SCNC: 23 MMOL/L (ref 22–29)
CO2 SERPL-SCNC: 24 MMOL/L (ref 22–29)
CO2 SERPL-SCNC: 26 MMOL/L (ref 22–29)
CREAT SERPL-MCNC: 5.9 MG/DL (ref 0.5–1)
CREAT SERPL-MCNC: 5.9 MG/DL (ref 0.5–1)
CREAT SERPL-MCNC: 6 MG/DL (ref 0.5–1)
ERYTHROCYTE [DISTWIDTH] IN BLOOD BY AUTOMATED COUNT: 15.9 % (ref 11.5–15)
GFR, ESTIMATED: 8 ML/MIN/1.73M2
GFR, ESTIMATED: 9 ML/MIN/1.73M2
GFR, ESTIMATED: 9 ML/MIN/1.73M2
GLUCOSE BLD-MCNC: 111 MG/DL (ref 74–99)
GLUCOSE BLD-MCNC: 138 MG/DL (ref 74–99)
GLUCOSE BLD-MCNC: 139 MG/DL (ref 74–99)
GLUCOSE BLD-MCNC: 141 MG/DL (ref 74–99)
GLUCOSE BLD-MCNC: 170 MG/DL (ref 74–99)
GLUCOSE BLD-MCNC: 192 MG/DL (ref 74–99)
GLUCOSE BLD-MCNC: 201 MG/DL (ref 74–99)
GLUCOSE BLD-MCNC: 202 MG/DL (ref 74–99)
GLUCOSE BLD-MCNC: 234 MG/DL (ref 74–99)
GLUCOSE BLD-MCNC: 256 MG/DL (ref 74–99)
GLUCOSE BLD-MCNC: 273 MG/DL (ref 74–99)
GLUCOSE BLD-MCNC: 83 MG/DL (ref 74–99)
GLUCOSE BLD-MCNC: 97 MG/DL (ref 74–99)
GLUCOSE SERPL-MCNC: 113 MG/DL (ref 74–99)
GLUCOSE SERPL-MCNC: 148 MG/DL (ref 74–99)
GLUCOSE SERPL-MCNC: 251 MG/DL (ref 74–99)
HCT VFR BLD AUTO: 19.4 % (ref 34–48)
HGB BLD-MCNC: 6.7 G/DL (ref 11.5–15.5)
LACTATE BLDV-SCNC: 2.5 MMOL/L (ref 0.5–2.2)
MAGNESIUM SERPL-MCNC: 1.8 MG/DL (ref 1.6–2.6)
MCH RBC QN AUTO: 30.3 PG (ref 26–35)
MCHC RBC AUTO-ENTMCNC: 34.5 G/DL (ref 32–34.5)
MCV RBC AUTO: 87.8 FL (ref 80–99.9)
PHOSPHATE SERPL-MCNC: 3.4 MG/DL (ref 2.5–4.5)
PHOSPHATE SERPL-MCNC: 3.7 MG/DL (ref 2.5–4.5)
PHOSPHATE SERPL-MCNC: 3.8 MG/DL (ref 2.5–4.5)
PLATELET # BLD AUTO: 212 K/UL (ref 130–450)
PMV BLD AUTO: 9.9 FL (ref 7–12)
POTASSIUM SERPL-SCNC: 4.3 MMOL/L (ref 3.5–5.1)
POTASSIUM SERPL-SCNC: 4.4 MMOL/L (ref 3.5–5.1)
POTASSIUM SERPL-SCNC: 5.1 MMOL/L (ref 3.5–5.1)
RBC # BLD AUTO: 2.21 M/UL (ref 3.5–5.5)
SODIUM SERPL-SCNC: 132 MMOL/L (ref 136–145)
SODIUM SERPL-SCNC: 134 MMOL/L (ref 136–145)
SODIUM SERPL-SCNC: 135 MMOL/L (ref 136–145)
WBC OTHER # BLD: 6.7 K/UL (ref 4.5–11.5)

## 2025-07-02 PROCEDURE — 6370000000 HC RX 637 (ALT 250 FOR IP): Performed by: INTERNAL MEDICINE

## 2025-07-02 PROCEDURE — 93005 ELECTROCARDIOGRAM TRACING: CPT | Performed by: INTERNAL MEDICINE

## 2025-07-02 PROCEDURE — 80048 BASIC METABOLIC PNL TOTAL CA: CPT

## 2025-07-02 PROCEDURE — 84100 ASSAY OF PHOSPHORUS: CPT

## 2025-07-02 PROCEDURE — 86923 COMPATIBILITY TEST ELECTRIC: CPT

## 2025-07-02 PROCEDURE — 6370000000 HC RX 637 (ALT 250 FOR IP)

## 2025-07-02 PROCEDURE — 86901 BLOOD TYPING SEROLOGIC RH(D): CPT

## 2025-07-02 PROCEDURE — 90935 HEMODIALYSIS ONE EVALUATION: CPT

## 2025-07-02 PROCEDURE — 2060000000 HC ICU INTERMEDIATE R&B

## 2025-07-02 PROCEDURE — 82962 GLUCOSE BLOOD TEST: CPT

## 2025-07-02 PROCEDURE — 30233N1 TRANSFUSION OF NONAUTOLOGOUS RED BLOOD CELLS INTO PERIPHERAL VEIN, PERCUTANEOUS APPROACH: ICD-10-PCS | Performed by: INTERNAL MEDICINE

## 2025-07-02 PROCEDURE — 99232 SBSQ HOSP IP/OBS MODERATE 35: CPT | Performed by: INTERNAL MEDICINE

## 2025-07-02 PROCEDURE — 6360000002 HC RX W HCPCS: Performed by: INTERNAL MEDICINE

## 2025-07-02 PROCEDURE — 36430 TRANSFUSION BLD/BLD COMPNT: CPT

## 2025-07-02 PROCEDURE — 86900 BLOOD TYPING SEROLOGIC ABO: CPT

## 2025-07-02 PROCEDURE — 85027 COMPLETE CBC AUTOMATED: CPT

## 2025-07-02 PROCEDURE — 6360000002 HC RX W HCPCS: Performed by: EMERGENCY MEDICINE

## 2025-07-02 PROCEDURE — P9016 RBC LEUKOCYTES REDUCED: HCPCS

## 2025-07-02 PROCEDURE — 94640 AIRWAY INHALATION TREATMENT: CPT

## 2025-07-02 PROCEDURE — 6360000002 HC RX W HCPCS

## 2025-07-02 PROCEDURE — 83735 ASSAY OF MAGNESIUM: CPT

## 2025-07-02 PROCEDURE — 2700000000 HC OXYGEN THERAPY PER DAY

## 2025-07-02 PROCEDURE — 71045 X-RAY EXAM CHEST 1 VIEW: CPT

## 2025-07-02 PROCEDURE — 86850 RBC ANTIBODY SCREEN: CPT

## 2025-07-02 PROCEDURE — 73560 X-RAY EXAM OF KNEE 1 OR 2: CPT

## 2025-07-02 RX ORDER — CLOPIDOGREL BISULFATE 75 MG/1
75 TABLET ORAL DAILY
Status: DISCONTINUED | OUTPATIENT
Start: 2025-07-02 | End: 2025-07-06 | Stop reason: HOSPADM

## 2025-07-02 RX ORDER — ACETAMINOPHEN 325 MG/1
650 TABLET ORAL EVERY 6 HOURS PRN
Status: DISCONTINUED | OUTPATIENT
Start: 2025-07-02 | End: 2025-07-06 | Stop reason: HOSPADM

## 2025-07-02 RX ORDER — ALBUTEROL SULFATE 0.83 MG/ML
2.5 SOLUTION RESPIRATORY (INHALATION) EVERY 6 HOURS
Status: DISCONTINUED | OUTPATIENT
Start: 2025-07-02 | End: 2025-07-06 | Stop reason: HOSPADM

## 2025-07-02 RX ORDER — METOPROLOL TARTRATE 25 MG/1
25 TABLET, FILM COATED ORAL 2 TIMES DAILY
Status: DISCONTINUED | OUTPATIENT
Start: 2025-07-02 | End: 2025-07-06 | Stop reason: HOSPADM

## 2025-07-02 RX ORDER — METOPROLOL TARTRATE 25 MG/1
12.5 TABLET, FILM COATED ORAL 2 TIMES DAILY
Status: DISCONTINUED | OUTPATIENT
Start: 2025-07-02 | End: 2025-07-02

## 2025-07-02 RX ORDER — SODIUM CHLORIDE 9 MG/ML
INJECTION, SOLUTION INTRAVENOUS PRN
Status: DISCONTINUED | OUTPATIENT
Start: 2025-07-02 | End: 2025-07-06 | Stop reason: HOSPADM

## 2025-07-02 RX ORDER — DOCUSATE SODIUM 100 MG/1
100 CAPSULE, LIQUID FILLED ORAL DAILY
Status: DISCONTINUED | OUTPATIENT
Start: 2025-07-02 | End: 2025-07-06 | Stop reason: HOSPADM

## 2025-07-02 RX ORDER — INSULIN LISPRO 100 [IU]/ML
0-4 INJECTION, SOLUTION INTRAVENOUS; SUBCUTANEOUS
Status: DISCONTINUED | OUTPATIENT
Start: 2025-07-02 | End: 2025-07-06 | Stop reason: HOSPADM

## 2025-07-02 RX ORDER — GABAPENTIN 600 MG/1
300 TABLET ORAL DAILY
Status: DISCONTINUED | OUTPATIENT
Start: 2025-07-02 | End: 2025-07-02 | Stop reason: DRUGHIGH

## 2025-07-02 RX ORDER — TRAMADOL HYDROCHLORIDE 50 MG/1
50 TABLET ORAL EVERY 6 HOURS PRN
Status: DISCONTINUED | OUTPATIENT
Start: 2025-07-02 | End: 2025-07-06 | Stop reason: HOSPADM

## 2025-07-02 RX ORDER — PANTOPRAZOLE SODIUM 40 MG/1
40 TABLET, DELAYED RELEASE ORAL
Status: DISCONTINUED | OUTPATIENT
Start: 2025-07-03 | End: 2025-07-06 | Stop reason: HOSPADM

## 2025-07-02 RX ORDER — HYDROMORPHONE HYDROCHLORIDE 1 MG/ML
1 INJECTION, SOLUTION INTRAMUSCULAR; INTRAVENOUS; SUBCUTANEOUS ONCE
Status: COMPLETED | OUTPATIENT
Start: 2025-07-02 | End: 2025-07-02

## 2025-07-02 RX ORDER — GABAPENTIN 300 MG/1
300 CAPSULE ORAL
Status: DISCONTINUED | OUTPATIENT
Start: 2025-07-03 | End: 2025-07-06 | Stop reason: HOSPADM

## 2025-07-02 RX ORDER — DIPHENHYDRAMINE HCL 25 MG
25 TABLET ORAL EVERY 6 HOURS PRN
Status: DISCONTINUED | OUTPATIENT
Start: 2025-07-02 | End: 2025-07-06 | Stop reason: HOSPADM

## 2025-07-02 RX ORDER — DIPHENHYDRAMINE HYDROCHLORIDE 50 MG/ML
12.5 INJECTION, SOLUTION INTRAMUSCULAR; INTRAVENOUS ONCE
Status: COMPLETED | OUTPATIENT
Start: 2025-07-02 | End: 2025-07-02

## 2025-07-02 RX ORDER — INSULIN GLARGINE 100 [IU]/ML
5 INJECTION, SOLUTION SUBCUTANEOUS DAILY
Status: DISCONTINUED | OUTPATIENT
Start: 2025-07-02 | End: 2025-07-03

## 2025-07-02 RX ADMIN — HYDRALAZINE HYDROCHLORIDE 10 MG: 20 INJECTION INTRAMUSCULAR; INTRAVENOUS at 12:39

## 2025-07-02 RX ADMIN — ACETAMINOPHEN 650 MG: 325 TABLET ORAL at 13:09

## 2025-07-02 RX ADMIN — METOPROLOL TARTRATE 25 MG: 25 TABLET, FILM COATED ORAL at 21:08

## 2025-07-02 RX ADMIN — DIPHENHYDRAMINE HYDROCHLORIDE 12.5 MG: 50 INJECTION INTRAMUSCULAR; INTRAVENOUS at 02:10

## 2025-07-02 RX ADMIN — INSULIN LISPRO 1 UNITS: 100 INJECTION, SOLUTION INTRAVENOUS; SUBCUTANEOUS at 21:08

## 2025-07-02 RX ADMIN — ACETAMINOPHEN 650 MG: 325 TABLET ORAL at 01:58

## 2025-07-02 RX ADMIN — METOPROLOL TARTRATE 12.5 MG: 25 TABLET, FILM COATED ORAL at 10:31

## 2025-07-02 RX ADMIN — CLOPIDOGREL BISULFATE 75 MG: 75 TABLET, FILM COATED ORAL at 10:31

## 2025-07-02 RX ADMIN — ALBUTEROL SULFATE 2.5 MG: 0.83 SOLUTION RESPIRATORY (INHALATION) at 13:04

## 2025-07-02 RX ADMIN — DIPHENHYDRAMINE HYDROCHLORIDE 25 MG: 25 TABLET ORAL at 21:09

## 2025-07-02 RX ADMIN — INSULIN GLARGINE 5 UNITS: 100 INJECTION, SOLUTION SUBCUTANEOUS at 11:16

## 2025-07-02 RX ADMIN — POTASSIUM CHLORIDE 10 MEQ: 10 INJECTION, SOLUTION INTRAVENOUS at 03:15

## 2025-07-02 RX ADMIN — POTASSIUM CHLORIDE 10 MEQ: 10 INJECTION, SOLUTION INTRAVENOUS at 02:12

## 2025-07-02 RX ADMIN — ALBUTEROL SULFATE 2.5 MG: 0.83 SOLUTION RESPIRATORY (INHALATION) at 19:53

## 2025-07-02 RX ADMIN — HYDROMORPHONE HYDROCHLORIDE 1 MG: 1 INJECTION, SOLUTION INTRAMUSCULAR; INTRAVENOUS; SUBCUTANEOUS at 13:36

## 2025-07-02 RX ADMIN — TRAMADOL HYDROCHLORIDE 50 MG: 50 TABLET, COATED ORAL at 23:41

## 2025-07-02 RX ADMIN — ACETAMINOPHEN 650 MG: 325 TABLET ORAL at 21:12

## 2025-07-02 RX ADMIN — LABETALOL HYDROCHLORIDE 10 MG: 5 INJECTION INTRAVENOUS at 09:05

## 2025-07-02 RX ADMIN — INSULIN LISPRO 2 UNITS: 100 INJECTION, SOLUTION INTRAVENOUS; SUBCUTANEOUS at 13:25

## 2025-07-02 ASSESSMENT — PAIN DESCRIPTION - LOCATION
LOCATION: KNEE
LOCATION: KNEE
LOCATION: LEG
LOCATION: CHEST

## 2025-07-02 ASSESSMENT — PAIN DESCRIPTION - ORIENTATION
ORIENTATION: MID
ORIENTATION: LEFT

## 2025-07-02 ASSESSMENT — PAIN - FUNCTIONAL ASSESSMENT
PAIN_FUNCTIONAL_ASSESSMENT: ACTIVITIES ARE NOT PREVENTED
PAIN_FUNCTIONAL_ASSESSMENT: PREVENTS OR INTERFERES SOME ACTIVE ACTIVITIES AND ADLS
PAIN_FUNCTIONAL_ASSESSMENT: ACTIVITIES ARE NOT PREVENTED
PAIN_FUNCTIONAL_ASSESSMENT: ACTIVITIES ARE NOT PREVENTED

## 2025-07-02 ASSESSMENT — PAIN DESCRIPTION - DESCRIPTORS
DESCRIPTORS: ACHING;POUNDING
DESCRIPTORS: POUNDING
DESCRIPTORS: PRESSURE;TIGHTNESS
DESCRIPTORS: ACHING;DISCOMFORT;NAGGING

## 2025-07-02 ASSESSMENT — PAIN SCALES - GENERAL
PAINLEVEL_OUTOF10: 5
PAINLEVEL_OUTOF10: 8
PAINLEVEL_OUTOF10: 0
PAINLEVEL_OUTOF10: 6
PAINLEVEL_OUTOF10: 0
PAINLEVEL_OUTOF10: 8

## 2025-07-02 NOTE — FLOWSHEET NOTE
Pt completed 3 hrs of HD on a 2K bath with 2.5L of UF removed safely. Post report to Cori RODRIGUEZ   07/02/25 1620   Vital Signs   /87   Temp 98.1 °F (36.7 °C)   Pulse (!) 106   Respirations 24   SpO2 97 %   Weight - Scale 54.5 kg (120 lb 2.4 oz)   Weight Method Bed scale   Percent Weight Change -4.39   Pain Assessment   Pain Assessment None - Denies Pain   Pain Level 0   Post-Hemodialysis Assessment   Post-Treatment Procedures Blood returned;Access bleeding time < 10 minutes   Machine Disinfection Process Acid/Vinegar Clean;Heat Disinfect;Exterior Machine Disinfection   Rinseback Volume (ml) 300 ml   Blood Volume Processed (Liters) 68.3 L   Dialyzer Clearance Moderately streaked   Duration of Treatment (minutes) 180 minutes   Hemodialysis Intake (ml) 300 ml   Hemodialysis Output (ml) 2800 ml   NET Removed (ml) 2500   Tolerated Treatment Good   Patient Response to Treatment tolerated well   Bilateral Breath Sounds Diminished   Edema Generalized   Edema Generalized +2   Facial Edema +1   Periorbital Edema +1   Time Off 1618   Patient Disposition Remain in ICU/ED   Observations & Evaluations   Level of Consciousness 0   Oriented X 3   Heart Rhythm Regular   Respiratory Quality/Effort Unlabored;Dyspnea with exertion   O2 Device Nasal cannula   Skin Condition/Temp Dry;Warm   Appetite Good   Abdomen Inspection Soft   Bowel Sounds (All Quadrants) Active   Handoff   Handoff Given To Cori Amaya RN   Handoff Received From Ally Lee RN   Handoff Communication Face to Face   Time Handoff Given 1640

## 2025-07-02 NOTE — ACP (ADVANCE CARE PLANNING)
Advance Care Planning   Healthcare Decision Maker:    Primary Decision Maker: Cain Musa-PRIMARY ON POA - Brother/Sister - [phone]    Secondary Decision Maker: Nohemy Musa-2ndary ON POA - Brother/Sister - [phone]    Click here to complete Healthcare Decision Makers including selection of the Healthcare Decision Maker Relationship (ie \"Primary\").  Today we documented Decision Maker(s) consistent with ACP documents on file.

## 2025-07-02 NOTE — FLOWSHEET NOTE
07/01/25 2135   Vital Signs   BP (!) 182/105   Temp 97.5 °F (36.4 °C)   Pulse (!) 102   Respirations 26   Weight - Scale 57 kg (125 lb 10.6 oz)   Weight Method Stated   Percent Weight Change -3.34   Pain Assessment   Pain Assessment None - Denies Pain   Pain Level 0   Post-Hemodialysis Assessment   Post-Treatment Procedures Blood returned;Access bleeding time > 10 minutes   Machine Disinfection Process Acid/Vinegar Clean;Exterior Machine Disinfection;Heat Disinfect   Rinseback Volume (ml) 300 ml   Blood Volume Processed (Liters) 45.5 L   Dialyzer Clearance Lightly streaked   Duration of Treatment (minutes) 120 minutes   Heparin Amount Administered During Treatment (mL) 0 mL   Hemodialysis Intake (ml) 300 ml   Hemodialysis Output (ml) 2300 ml   NET Removed (ml) 2000   Tolerated Treatment Good   Patient Response to Treatment Pt tolerated tx well.   Bilateral Breath Sounds Diminished   Edema Generalized   Edema Generalized +2   RLE Edema +2   LLE Edema +2   Facial Edema +2   Time Off 2116   Patient Disposition Remain in ICU/ED   Observations & Evaluations   Level of Consciousness 1   Heart Rhythm Regular   Respiratory Quality/Effort Unlabored   O2 Device Nasal cannula   Skin Condition/Temp Dry;Warm   Abdomen Inspection Distended   Bowel Sounds (All Quadrants) Active   Comments Pt tolerated well and completed 2 hr HD on a 3K, 2.5Ca bath with 2L of fluid removed. Pt rinsed back, blood returned. Sites held x 2 x 15 min., hemostasis achieved. Gauze dressings applied and secured with paper tape. Pt awake and stable post tx. Report given to primary RN Anne.   Handoff   Communication Given Transfer Handoff   Handoff Given To Anne Lo   Handoff Received From Rossy Prado   Handoff Communication Face to Face   Time Handoff Given 2130

## 2025-07-02 NOTE — PLAN OF CARE
Problem: Chronic Conditions and Co-morbidities  Goal: Patient's chronic conditions and co-morbidity symptoms are monitored and maintained or improved  Outcome: Progressing     Problem: Discharge Planning  Goal: Discharge to home or other facility with appropriate resources  Outcome: Progressing     Problem: Pain  Goal: Verbalizes/displays adequate comfort level or baseline comfort level  7/2/2025 0744 by Anne Lo RN  Outcome: Progressing  7/1/2025 1819 by Cori Amaya RN  Flowsheets (Taken 7/1/2025 1819)  Verbalizes/displays adequate comfort level or baseline comfort level:   Encourage patient to monitor pain and request assistance   Assess pain using appropriate pain scale   Administer analgesics based on type and severity of pain and evaluate response   Implement non-pharmacological measures as appropriate and evaluate response   Notify Licensed Independent Practitioner if interventions unsuccessful or patient reports new pain   Consider cultural and social influences on pain and pain management     Problem: Safety - Adult  Goal: Free from fall injury  7/2/2025 0744 by Anne Lo RN  Outcome: Progressing  7/1/2025 1819 by Cori Amaya RN  Outcome: Progressing  Flowsheets (Taken 7/1/2025 1819)  Free From Fall Injury: Instruct family/caregiver on patient safety     Problem: ABCDS Injury Assessment  Goal: Absence of physical injury  7/2/2025 0744 by Anne Lo RN  Outcome: Progressing  7/1/2025 1819 by Cori Amaya RN  Outcome: Progressing  Flowsheets (Taken 7/1/2025 1819)  Absence of Physical Injury: Implement safety measures based on patient assessment

## 2025-07-02 NOTE — CONSENT
Informed Consent for Blood Component Transfusion Note    I have discussed with the patient the rationale for blood component transfusion; its benefits in treating or preventing fatigue, organ damage, or death; and its risk which includes mild transfusion reactions, rare risk of blood borne infection, or more serious but rare reactions. I have discussed the alternatives to transfusion, including the risk and consequences of not receiving transfusion. The patient had an opportunity to ask questions and had agreed to proceed with transfusion of blood components.    Electronically signed by KEMI Escalante CNP on 7/2/25 at 6:35 AM EDT

## 2025-07-03 ENCOUNTER — APPOINTMENT (OUTPATIENT)
Dept: GENERAL RADIOLOGY | Age: 40
DRG: 637 | End: 2025-07-03
Payer: MEDICARE

## 2025-07-03 LAB
ABO/RH: NORMAL
ANION GAP SERPL CALCULATED.3IONS-SCNC: 12 MMOL/L (ref 7–16)
ANTIBODY SCREEN: NEGATIVE
ARM BAND NUMBER: NORMAL
BLOOD BANK BLOOD PRODUCT EXPIRATION DATE: NORMAL
BLOOD BANK DISPENSE STATUS: NORMAL
BLOOD BANK ISBT PRODUCT BLOOD TYPE: 5100
BLOOD BANK PRODUCT CODE: NORMAL
BLOOD BANK SAMPLE EXPIRATION: NORMAL
BLOOD BANK UNIT TYPE AND RH: NORMAL
BPU ID: NORMAL
BUN SERPL-MCNC: 14 MG/DL (ref 6–20)
CALCIUM SERPL-MCNC: 8.4 MG/DL (ref 8.6–10)
CHLORIDE SERPL-SCNC: 95 MMOL/L (ref 98–107)
CO2 SERPL-SCNC: 25 MMOL/L (ref 22–29)
COMPONENT: NORMAL
CREAT SERPL-MCNC: 4.6 MG/DL (ref 0.5–1)
CROSSMATCH RESULT: NORMAL
EKG ATRIAL RATE: 102 BPM
EKG P AXIS: 64 DEGREES
EKG P-R INTERVAL: 118 MS
EKG Q-T INTERVAL: 346 MS
EKG QRS DURATION: 68 MS
EKG QTC CALCULATION (BAZETT): 450 MS
EKG R AXIS: 22 DEGREES
EKG T AXIS: 87 DEGREES
EKG VENTRICULAR RATE: 102 BPM
ERYTHROCYTE [DISTWIDTH] IN BLOOD BY AUTOMATED COUNT: 16 % (ref 11.5–15)
GFR, ESTIMATED: 12 ML/MIN/1.73M2
GLUCOSE BLD-MCNC: 184 MG/DL (ref 74–99)
GLUCOSE BLD-MCNC: 231 MG/DL (ref 74–99)
GLUCOSE BLD-MCNC: 290 MG/DL (ref 74–99)
GLUCOSE BLD-MCNC: 322 MG/DL (ref 74–99)
GLUCOSE BLD-MCNC: 409 MG/DL (ref 74–99)
GLUCOSE BLD-MCNC: 447 MG/DL (ref 74–99)
GLUCOSE BLD-MCNC: 47 MG/DL (ref 74–99)
GLUCOSE BLD-MCNC: 68 MG/DL (ref 74–99)
GLUCOSE BLD-MCNC: 96 MG/DL (ref 74–99)
GLUCOSE SERPL-MCNC: 460 MG/DL (ref 74–99)
HCT VFR BLD AUTO: 24.3 % (ref 34–48)
HGB BLD-MCNC: 8.4 G/DL (ref 11.5–15.5)
MAGNESIUM SERPL-MCNC: 1.8 MG/DL (ref 1.6–2.6)
MCH RBC QN AUTO: 30.7 PG (ref 26–35)
MCHC RBC AUTO-ENTMCNC: 34.6 G/DL (ref 32–34.5)
MCV RBC AUTO: 88.7 FL (ref 80–99.9)
PHOSPHATE SERPL-MCNC: 4.3 MG/DL (ref 2.5–4.5)
PLATELET # BLD AUTO: 177 K/UL (ref 130–450)
PMV BLD AUTO: 9.5 FL (ref 7–12)
POTASSIUM SERPL-SCNC: 4.4 MMOL/L (ref 3.5–5.1)
RBC # BLD AUTO: 2.74 M/UL (ref 3.5–5.5)
SODIUM SERPL-SCNC: 132 MMOL/L (ref 136–145)
TRANSFUSION STATUS: NORMAL
UNIT DIVISION: 0
UNIT ISSUE DATE/TIME: NORMAL
WBC OTHER # BLD: 5.6 K/UL (ref 4.5–11.5)

## 2025-07-03 PROCEDURE — 6370000000 HC RX 637 (ALT 250 FOR IP)

## 2025-07-03 PROCEDURE — 85027 COMPLETE CBC AUTOMATED: CPT

## 2025-07-03 PROCEDURE — 93010 ELECTROCARDIOGRAM REPORT: CPT | Performed by: INTERNAL MEDICINE

## 2025-07-03 PROCEDURE — 83735 ASSAY OF MAGNESIUM: CPT

## 2025-07-03 PROCEDURE — 2700000000 HC OXYGEN THERAPY PER DAY

## 2025-07-03 PROCEDURE — 36415 COLL VENOUS BLD VENIPUNCTURE: CPT

## 2025-07-03 PROCEDURE — 2060000000 HC ICU INTERMEDIATE R&B

## 2025-07-03 PROCEDURE — 84100 ASSAY OF PHOSPHORUS: CPT

## 2025-07-03 PROCEDURE — 80048 BASIC METABOLIC PNL TOTAL CA: CPT

## 2025-07-03 PROCEDURE — 6370000000 HC RX 637 (ALT 250 FOR IP): Performed by: INTERNAL MEDICINE

## 2025-07-03 PROCEDURE — 99232 SBSQ HOSP IP/OBS MODERATE 35: CPT | Performed by: INTERNAL MEDICINE

## 2025-07-03 PROCEDURE — 90935 HEMODIALYSIS ONE EVALUATION: CPT

## 2025-07-03 PROCEDURE — 94640 AIRWAY INHALATION TREATMENT: CPT

## 2025-07-03 PROCEDURE — 82962 GLUCOSE BLOOD TEST: CPT

## 2025-07-03 PROCEDURE — 6360000002 HC RX W HCPCS: Performed by: INTERNAL MEDICINE

## 2025-07-03 PROCEDURE — 71045 X-RAY EXAM CHEST 1 VIEW: CPT

## 2025-07-03 RX ORDER — INSULIN LISPRO 100 [IU]/ML
4 INJECTION, SOLUTION INTRAVENOUS; SUBCUTANEOUS ONCE
Status: COMPLETED | OUTPATIENT
Start: 2025-07-03 | End: 2025-07-03

## 2025-07-03 RX ORDER — INSULIN LISPRO 100 [IU]/ML
8 INJECTION, SOLUTION INTRAVENOUS; SUBCUTANEOUS ONCE
Status: COMPLETED | OUTPATIENT
Start: 2025-07-03 | End: 2025-07-03

## 2025-07-03 RX ORDER — INSULIN GLARGINE 100 [IU]/ML
15 INJECTION, SOLUTION SUBCUTANEOUS NIGHTLY
Status: DISCONTINUED | OUTPATIENT
Start: 2025-07-03 | End: 2025-07-06 | Stop reason: HOSPADM

## 2025-07-03 RX ORDER — DEXTROSE MONOHYDRATE 100 MG/ML
INJECTION, SOLUTION INTRAVENOUS CONTINUOUS PRN
Status: DISCONTINUED | OUTPATIENT
Start: 2025-07-03 | End: 2025-07-06 | Stop reason: HOSPADM

## 2025-07-03 RX ORDER — INSULIN GLARGINE 100 [IU]/ML
15 INJECTION, SOLUTION SUBCUTANEOUS DAILY
Status: DISCONTINUED | OUTPATIENT
Start: 2025-07-04 | End: 2025-07-03

## 2025-07-03 RX ORDER — GLUCAGON 1 MG/ML
1 KIT INJECTION PRN
Status: DISCONTINUED | OUTPATIENT
Start: 2025-07-03 | End: 2025-07-06 | Stop reason: HOSPADM

## 2025-07-03 RX ADMIN — GABAPENTIN 300 MG: 300 CAPSULE ORAL at 15:59

## 2025-07-03 RX ADMIN — ALBUTEROL SULFATE 2.5 MG: 0.83 SOLUTION RESPIRATORY (INHALATION) at 14:34

## 2025-07-03 RX ADMIN — METOPROLOL TARTRATE 25 MG: 25 TABLET, FILM COATED ORAL at 14:44

## 2025-07-03 RX ADMIN — ALBUTEROL SULFATE 2.5 MG: 0.83 SOLUTION RESPIRATORY (INHALATION) at 01:30

## 2025-07-03 RX ADMIN — INSULIN LISPRO 4 UNITS: 100 INJECTION, SOLUTION INTRAVENOUS; SUBCUTANEOUS at 06:01

## 2025-07-03 RX ADMIN — METOPROLOL TARTRATE 25 MG: 25 TABLET, FILM COATED ORAL at 20:19

## 2025-07-03 RX ADMIN — DIPHENHYDRAMINE HYDROCHLORIDE 25 MG: 25 TABLET ORAL at 09:06

## 2025-07-03 RX ADMIN — INSULIN GLARGINE 15 UNITS: 100 INJECTION, SOLUTION SUBCUTANEOUS at 20:19

## 2025-07-03 RX ADMIN — INSULIN LISPRO 1 UNITS: 100 INJECTION, SOLUTION INTRAVENOUS; SUBCUTANEOUS at 20:19

## 2025-07-03 RX ADMIN — Medication 16 G: at 13:26

## 2025-07-03 RX ADMIN — DIPHENHYDRAMINE HYDROCHLORIDE 25 MG: 25 TABLET ORAL at 20:19

## 2025-07-03 RX ADMIN — ALBUTEROL SULFATE 2.5 MG: 0.83 SOLUTION RESPIRATORY (INHALATION) at 20:21

## 2025-07-03 RX ADMIN — INSULIN LISPRO 2 UNITS: 100 INJECTION, SOLUTION INTRAVENOUS; SUBCUTANEOUS at 18:48

## 2025-07-03 RX ADMIN — PANTOPRAZOLE SODIUM 40 MG: 40 TABLET, DELAYED RELEASE ORAL at 05:30

## 2025-07-03 RX ADMIN — INSULIN LISPRO 8 UNITS: 100 INJECTION, SOLUTION INTRAVENOUS; SUBCUTANEOUS at 07:39

## 2025-07-03 RX ADMIN — TRAMADOL HYDROCHLORIDE 50 MG: 50 TABLET, COATED ORAL at 16:03

## 2025-07-03 RX ADMIN — CLOPIDOGREL BISULFATE 75 MG: 75 TABLET, FILM COATED ORAL at 14:43

## 2025-07-03 ASSESSMENT — PAIN SCALES - GENERAL
PAINLEVEL_OUTOF10: 8
PAINLEVEL_OUTOF10: 0
PAINLEVEL_OUTOF10: 7
PAINLEVEL_OUTOF10: 4

## 2025-07-03 ASSESSMENT — PAIN DESCRIPTION - DESCRIPTORS
DESCRIPTORS: STABBING
DESCRIPTORS: STABBING

## 2025-07-03 ASSESSMENT — PAIN DESCRIPTION - ORIENTATION
ORIENTATION: LEFT
ORIENTATION: LEFT

## 2025-07-03 ASSESSMENT — PAIN DESCRIPTION - LOCATION
LOCATION: KNEE
LOCATION: KNEE

## 2025-07-03 NOTE — FLOWSHEET NOTE
07/03/25 1211   Vital Signs   BP (!) 184/99   Temp 98 °F (36.7 °C)   Pulse (!) 102   Respirations 14   Weight - Scale 65.1 kg (143 lb 8.3 oz)   Weight Method Bed scale   Percent Weight Change 19.45   Pain Assessment   Pain Assessment None - Denies Pain   Pain Level 0   Post-Hemodialysis Assessment   Post-Treatment Procedures Blood returned;Access bleeding time < 10 minutes   Machine Disinfection Process Acid/Vinegar Clean;Heat Disinfect;Exterior Machine Disinfection   Rinseback Volume (ml) 300 ml   Blood Volume Processed (Liters) 90 L   Dialyzer Clearance Moderately streaked   Duration of Treatment (minutes) 240 minutes   Heparin Amount Administered During Treatment (mL) 0 mL   Hemodialysis Intake (ml) 300 ml   Hemodialysis Output (ml) 3900 ml   NET Removed (ml) 3600   Tolerated Treatment Good   Patient Response to Treatment HD treatment completed as ordered, patient tolerated treatment w/o issues. -3600 mls fluid. Blood returned to patient per [facility] policy, access sites held until hemostasis achieved, dressings dry and intact x2, + bruit and thrill noted.  Patient stable, returned to room, post HD report called to JENNIFER Weeks.   Bilateral Breath Sounds Diminished   Edema Generalized;Right lower extremity;Left lower extremity;Right upper extremity;Left upper extremity;Facial   Edema Generalized +1   RUE Edema +1   LUE Edema +1   RLE Edema +1   LLE Edema +1   Facial Edema +1   Periorbital Edema +1   Physician Notified No   Time Off 1156   Patient Disposition Return to room   Observations & Evaluations   Level of Consciousness 0   Oriented X 3   Heart Rhythm Regular   Respiratory Quality/Effort Unlabored   O2 Device Nasal cannula   Skin Color Other (comment)  (appropriate)   Skin Condition/Temp Dry;Warm;Swollen/edematous   Appetite Good   Abdomen Inspection Rounded;Soft   Bowel Sounds (All Quadrants) Active;Present   Handoff   Communication Given Transfer Handoff   Handoff Given To JENNIFER Weeks   Handoff Received From

## 2025-07-03 NOTE — PLAN OF CARE
Problem: Chronic Conditions and Co-morbidities  Goal: Patient's chronic conditions and co-morbidity symptoms are monitored and maintained or improved  7/3/2025 1607 by Cornelius Simmons RN  Outcome: Progressing  7/3/2025 1422 by Cornelius Simmons RN  Outcome: Progressing  7/3/2025 1422 by Cornelius Simmons RN  Outcome: Progressing     Problem: Discharge Planning  Goal: Discharge to home or other facility with appropriate resources  7/3/2025 1607 by Cornelius Simmons RN  Outcome: Progressing  7/3/2025 1422 by Cornelius Simmons RN  Outcome: Progressing  7/3/2025 1422 by Cornelius Simmons RN  Outcome: Progressing     Problem: Pain  Goal: Verbalizes/displays adequate comfort level or baseline comfort level  7/3/2025 1607 by Cornelius Simmons RN  Outcome: Progressing  7/3/2025 1422 by Cornelius Simmons RN  Outcome: Progressing  7/3/2025 1422 by Cornelius Simmons RN  Outcome: Progressing     Problem: Safety - Adult  Goal: Free from fall injury  7/3/2025 1607 by Cornelius Simmons RN  Outcome: Progressing  7/3/2025 1422 by Cornelius Simmons RN  Outcome: Progressing  7/3/2025 1422 by Cornelius Simmons RN  Outcome: Progressing     Problem: ABCDS Injury Assessment  Goal: Absence of physical injury  7/3/2025 1607 by Cornelius Simmons RN  Outcome: Progressing  7/3/2025 1422 by Cornelius Simmons RN  Outcome: Progressing  7/3/2025 1422 by Cornelius Simmons RN  Outcome: Progressing

## 2025-07-03 NOTE — PLAN OF CARE
Problem: Chronic Conditions and Co-morbidities  Goal: Patient's chronic conditions and co-morbidity symptoms are monitored and maintained or improved  7/3/2025 1422 by Cornelius Simmons RN  Outcome: Progressing  7/3/2025 1422 by Cornelius Simmons RN  Outcome: Progressing  7/3/2025 0055 by Monica Cummins RN  Outcome: Progressing     Problem: Discharge Planning  Goal: Discharge to home or other facility with appropriate resources  7/3/2025 1422 by Cornelius Simmons RN  Outcome: Progressing  7/3/2025 1422 by Cornelius Simmons RN  Outcome: Progressing  7/3/2025 0055 by Monica Cummins RN  Outcome: Progressing     Problem: Pain  Goal: Verbalizes/displays adequate comfort level or baseline comfort level  7/3/2025 1422 by Cornelius iSmmons RN  Outcome: Progressing  7/3/2025 1422 by Cornelius Simmons RN  Outcome: Progressing  7/3/2025 0055 by Monica Cummins RN  Outcome: Progressing     Problem: Safety - Adult  Goal: Free from fall injury  7/3/2025 1422 by Cornelius Simmons RN  Outcome: Progressing  7/3/2025 1422 by Cornelius Simmons RN  Outcome: Progressing  7/3/2025 0055 by Monica Cummins RN  Outcome: Progressing     Problem: ABCDS Injury Assessment  Goal: Absence of physical injury  7/3/2025 1422 by Cornelius Simmons RN  Outcome: Progressing  7/3/2025 1422 by Cornelius Simmons RN  Outcome: Progressing  7/3/2025 0055 by Monica Cummins RN  Outcome: Progressing

## 2025-07-04 ENCOUNTER — APPOINTMENT (OUTPATIENT)
Dept: GENERAL RADIOLOGY | Age: 40
DRG: 637 | End: 2025-07-04
Payer: MEDICARE

## 2025-07-04 DIAGNOSIS — J30.2 SEASONAL ALLERGIES: ICD-10-CM

## 2025-07-04 LAB
ANION GAP SERPL CALCULATED.3IONS-SCNC: 12 MMOL/L (ref 7–16)
BNP SERPL-MCNC: ABNORMAL PG/ML (ref 0–125)
BUN SERPL-MCNC: 12 MG/DL (ref 6–20)
CALCIUM SERPL-MCNC: 8.6 MG/DL (ref 8.6–10)
CHLORIDE SERPL-SCNC: 102 MMOL/L (ref 98–107)
CO2 SERPL-SCNC: 26 MMOL/L (ref 22–29)
CREAT SERPL-MCNC: 3.7 MG/DL (ref 0.5–1)
ERYTHROCYTE [DISTWIDTH] IN BLOOD BY AUTOMATED COUNT: 15.9 % (ref 11.5–15)
GFR, ESTIMATED: 15 ML/MIN/1.73M2
GLUCOSE BLD-MCNC: 119 MG/DL (ref 74–99)
GLUCOSE BLD-MCNC: 130 MG/DL (ref 74–99)
GLUCOSE BLD-MCNC: 141 MG/DL (ref 74–99)
GLUCOSE BLD-MCNC: 198 MG/DL (ref 74–99)
GLUCOSE SERPL-MCNC: 213 MG/DL (ref 74–99)
HCT VFR BLD AUTO: 27.8 % (ref 34–48)
HGB BLD-MCNC: 8.9 G/DL (ref 11.5–15.5)
MAGNESIUM SERPL-MCNC: 1.9 MG/DL (ref 1.6–2.6)
MCH RBC QN AUTO: 29.8 PG (ref 26–35)
MCHC RBC AUTO-ENTMCNC: 32 G/DL (ref 32–34.5)
MCV RBC AUTO: 93 FL (ref 80–99.9)
PHOSPHATE SERPL-MCNC: 4.5 MG/DL (ref 2.5–4.5)
PLATELET # BLD AUTO: 197 K/UL (ref 130–450)
PMV BLD AUTO: 9.8 FL (ref 7–12)
POTASSIUM SERPL-SCNC: 4.5 MMOL/L (ref 3.5–5.1)
RBC # BLD AUTO: 2.99 M/UL (ref 3.5–5.5)
SODIUM SERPL-SCNC: 140 MMOL/L (ref 136–145)
WBC OTHER # BLD: 5.2 K/UL (ref 4.5–11.5)

## 2025-07-04 PROCEDURE — 6360000002 HC RX W HCPCS: Performed by: INTERNAL MEDICINE

## 2025-07-04 PROCEDURE — 90935 HEMODIALYSIS ONE EVALUATION: CPT

## 2025-07-04 PROCEDURE — 2700000000 HC OXYGEN THERAPY PER DAY

## 2025-07-04 PROCEDURE — 6370000000 HC RX 637 (ALT 250 FOR IP): Performed by: INTERNAL MEDICINE

## 2025-07-04 PROCEDURE — 97165 OT EVAL LOW COMPLEX 30 MIN: CPT

## 2025-07-04 PROCEDURE — 82962 GLUCOSE BLOOD TEST: CPT

## 2025-07-04 PROCEDURE — 97530 THERAPEUTIC ACTIVITIES: CPT

## 2025-07-04 PROCEDURE — 6370000000 HC RX 637 (ALT 250 FOR IP)

## 2025-07-04 PROCEDURE — 97161 PT EVAL LOW COMPLEX 20 MIN: CPT

## 2025-07-04 PROCEDURE — 84100 ASSAY OF PHOSPHORUS: CPT

## 2025-07-04 PROCEDURE — 80048 BASIC METABOLIC PNL TOTAL CA: CPT

## 2025-07-04 PROCEDURE — 83880 ASSAY OF NATRIURETIC PEPTIDE: CPT

## 2025-07-04 PROCEDURE — 85027 COMPLETE CBC AUTOMATED: CPT

## 2025-07-04 PROCEDURE — 94640 AIRWAY INHALATION TREATMENT: CPT

## 2025-07-04 PROCEDURE — 2060000000 HC ICU INTERMEDIATE R&B

## 2025-07-04 PROCEDURE — 99232 SBSQ HOSP IP/OBS MODERATE 35: CPT | Performed by: INTERNAL MEDICINE

## 2025-07-04 PROCEDURE — 97535 SELF CARE MNGMENT TRAINING: CPT

## 2025-07-04 PROCEDURE — 71045 X-RAY EXAM CHEST 1 VIEW: CPT

## 2025-07-04 PROCEDURE — 83735 ASSAY OF MAGNESIUM: CPT

## 2025-07-04 PROCEDURE — 36415 COLL VENOUS BLD VENIPUNCTURE: CPT

## 2025-07-04 RX ORDER — ONDANSETRON 2 MG/ML
4 INJECTION INTRAMUSCULAR; INTRAVENOUS EVERY 6 HOURS PRN
Status: DISCONTINUED | OUTPATIENT
Start: 2025-07-04 | End: 2025-07-06 | Stop reason: HOSPADM

## 2025-07-04 RX ORDER — HYDROCODONE BITARTRATE AND ACETAMINOPHEN 5; 325 MG/1; MG/1
1 TABLET ORAL EVERY 6 HOURS PRN
Qty: 28 TABLET | Refills: 0 | Status: CANCELLED | OUTPATIENT
Start: 2025-07-04 | End: 2025-07-11

## 2025-07-04 RX ORDER — TRAMADOL HYDROCHLORIDE 50 MG/1
50 TABLET ORAL EVERY 6 HOURS PRN
Qty: 10 TABLET | Refills: 0 | Status: SHIPPED | OUTPATIENT
Start: 2025-07-04 | End: 2025-07-07

## 2025-07-04 RX ADMIN — CLOPIDOGREL BISULFATE 75 MG: 75 TABLET, FILM COATED ORAL at 08:12

## 2025-07-04 RX ADMIN — INSULIN GLARGINE 15 UNITS: 100 INJECTION, SOLUTION SUBCUTANEOUS at 20:55

## 2025-07-04 RX ADMIN — DIPHENHYDRAMINE HYDROCHLORIDE 25 MG: 25 TABLET ORAL at 20:58

## 2025-07-04 RX ADMIN — ONDANSETRON 4 MG: 2 INJECTION, SOLUTION INTRAMUSCULAR; INTRAVENOUS at 13:22

## 2025-07-04 RX ADMIN — TRAMADOL HYDROCHLORIDE 50 MG: 50 TABLET, COATED ORAL at 00:13

## 2025-07-04 RX ADMIN — DOCUSATE SODIUM 100 MG: 100 CAPSULE, LIQUID FILLED ORAL at 08:12

## 2025-07-04 RX ADMIN — ALBUTEROL SULFATE 2.5 MG: 0.83 SOLUTION RESPIRATORY (INHALATION) at 08:43

## 2025-07-04 RX ADMIN — TRAMADOL HYDROCHLORIDE 50 MG: 50 TABLET, COATED ORAL at 11:03

## 2025-07-04 RX ADMIN — INSULIN LISPRO 1 UNITS: 100 INJECTION, SOLUTION INTRAVENOUS; SUBCUTANEOUS at 08:12

## 2025-07-04 RX ADMIN — PANTOPRAZOLE SODIUM 40 MG: 40 TABLET, DELAYED RELEASE ORAL at 05:26

## 2025-07-04 RX ADMIN — ALBUTEROL SULFATE 2.5 MG: 0.83 SOLUTION RESPIRATORY (INHALATION) at 18:57

## 2025-07-04 RX ADMIN — METOPROLOL TARTRATE 25 MG: 25 TABLET, FILM COATED ORAL at 08:12

## 2025-07-04 RX ADMIN — METOPROLOL TARTRATE 25 MG: 25 TABLET, FILM COATED ORAL at 20:55

## 2025-07-04 RX ADMIN — TRAMADOL HYDROCHLORIDE 50 MG: 50 TABLET, COATED ORAL at 20:59

## 2025-07-04 ASSESSMENT — PAIN DESCRIPTION - DESCRIPTORS
DESCRIPTORS: ACHING;DISCOMFORT
DESCRIPTORS: DISCOMFORT
DESCRIPTORS: DISCOMFORT

## 2025-07-04 ASSESSMENT — PAIN DESCRIPTION - ORIENTATION
ORIENTATION: LEFT
ORIENTATION: LEFT
ORIENTATION: RIGHT

## 2025-07-04 ASSESSMENT — PAIN SCALES - GENERAL
PAINLEVEL_OUTOF10: 7
PAINLEVEL_OUTOF10: 8
PAINLEVEL_OUTOF10: 4
PAINLEVEL_OUTOF10: 4
PAINLEVEL_OUTOF10: 6

## 2025-07-04 ASSESSMENT — PAIN DESCRIPTION - LOCATION
LOCATION: LEG
LOCATION: ABDOMEN;KNEE
LOCATION: LEG;BACK

## 2025-07-04 NOTE — PLAN OF CARE
Problem: Chronic Conditions and Co-morbidities  Goal: Patient's chronic conditions and co-morbidity symptoms are monitored and maintained or improved  7/4/2025 0021 by Radha Swartz RN  Outcome: Progressing  7/3/2025 1607 by Cornelius Simmons RN  Outcome: Progressing  7/3/2025 1422 by Cornelius Simmons RN  Outcome: Progressing  7/3/2025 1422 by Cornelius Simmons RN  Outcome: Progressing     Problem: Discharge Planning  Goal: Discharge to home or other facility with appropriate resources  7/4/2025 0021 by Radha Swartz RN  Outcome: Progressing  7/3/2025 1607 by Cornelius Simmons RN  Outcome: Progressing  7/3/2025 1422 by Cornelius Simmons RN  Outcome: Progressing  7/3/2025 1422 by Cornelius Simmons RN  Outcome: Progressing     Problem: Pain  Goal: Verbalizes/displays adequate comfort level or baseline comfort level  7/4/2025 0021 by Radha Swartz RN  Outcome: Progressing  7/3/2025 1607 by Cornelius Simmons RN  Outcome: Progressing  7/3/2025 1422 by Cornelius Simmons RN  Outcome: Progressing  7/3/2025 1422 by Cornelius Simmons RN  Outcome: Progressing     Problem: Safety - Adult  Goal: Free from fall injury  7/4/2025 0021 by Radha Swartz RN  Outcome: Progressing  7/3/2025 1607 by Cornelius Simmons RN  Outcome: Progressing  7/3/2025 1422 by Cornelius Simmons RN  Outcome: Progressing  7/3/2025 1422 by Cornelius Simmons RN  Outcome: Progressing     Problem: ABCDS Injury Assessment  Goal: Absence of physical injury  7/4/2025 0021 by Radha Swartz RN  Outcome: Progressing  7/3/2025 1607 by Cornelius Simmons RN  Outcome: Progressing  7/3/2025 1422 by Cornelius Simmons RN  Outcome: Progressing  7/3/2025 1422 by Cornelius Simmons RN  Outcome: Progressing

## 2025-07-04 NOTE — FLOWSHEET NOTE
07/04/25 1807   Vital Signs   BP (!) 151/73   Temp 98.4 °F (36.9 °C)   Pulse (!) 102   Respirations 18   Pain Assessment   Pain Assessment None - Denies Pain   Post-Hemodialysis Assessment   Post-Treatment Procedures Access bleeding time < 10 minutes   Machine Disinfection Process Acid/Vinegar Clean;Heat Disinfect   Rinseback Volume (ml) 300 ml   Dialyzer Clearance Lightly streaked   Duration of Treatment (minutes) 210 minutes   Hemodialysis Intake (ml) 300 ml   Hemodialysis Output (ml) 3300 ml   NET Removed (ml) 3000   Tolerated Treatment Good   Bilateral Breath Sounds Diminished   Edema Facial   Time Off 1743   Patient Disposition Return to room   Observations & Evaluations   Level of Consciousness 0   Respiratory Quality/Effort Unlabored

## 2025-07-05 ENCOUNTER — APPOINTMENT (OUTPATIENT)
Dept: GENERAL RADIOLOGY | Age: 40
DRG: 637 | End: 2025-07-05
Payer: MEDICARE

## 2025-07-05 LAB
ANION GAP SERPL CALCULATED.3IONS-SCNC: 11 MMOL/L (ref 7–16)
BUN SERPL-MCNC: 9 MG/DL (ref 6–20)
CALCIUM SERPL-MCNC: 9.1 MG/DL (ref 8.6–10)
CHLORIDE SERPL-SCNC: 99 MMOL/L (ref 98–107)
CO2 SERPL-SCNC: 27 MMOL/L (ref 22–29)
CREAT SERPL-MCNC: 3 MG/DL (ref 0.5–1)
ERYTHROCYTE [DISTWIDTH] IN BLOOD BY AUTOMATED COUNT: 15.6 % (ref 11.5–15)
GFR, ESTIMATED: 19 ML/MIN/1.73M2
GLUCOSE BLD-MCNC: 100 MG/DL (ref 74–99)
GLUCOSE BLD-MCNC: 104 MG/DL (ref 74–99)
GLUCOSE BLD-MCNC: 129 MG/DL (ref 74–99)
GLUCOSE BLD-MCNC: 135 MG/DL (ref 74–99)
GLUCOSE BLD-MCNC: 41 MG/DL (ref 74–99)
GLUCOSE BLD-MCNC: 72 MG/DL (ref 74–99)
GLUCOSE BLD-MCNC: 90 MG/DL (ref 74–99)
GLUCOSE SERPL-MCNC: 41 MG/DL (ref 74–99)
HCT VFR BLD AUTO: 30.5 % (ref 34–48)
HGB BLD-MCNC: 10 G/DL (ref 11.5–15.5)
MAGNESIUM SERPL-MCNC: 1.9 MG/DL (ref 1.6–2.6)
MCH RBC QN AUTO: 29.9 PG (ref 26–35)
MCHC RBC AUTO-ENTMCNC: 32.8 G/DL (ref 32–34.5)
MCV RBC AUTO: 91 FL (ref 80–99.9)
PHOSPHATE SERPL-MCNC: 2.9 MG/DL (ref 2.5–4.5)
PLATELET # BLD AUTO: 197 K/UL (ref 130–450)
PMV BLD AUTO: 10.2 FL (ref 7–12)
POTASSIUM SERPL-SCNC: 3.4 MMOL/L (ref 3.5–5.1)
RBC # BLD AUTO: 3.35 M/UL (ref 3.5–5.5)
SODIUM SERPL-SCNC: 137 MMOL/L (ref 136–145)
WBC OTHER # BLD: 6.2 K/UL (ref 4.5–11.5)

## 2025-07-05 PROCEDURE — 36415 COLL VENOUS BLD VENIPUNCTURE: CPT

## 2025-07-05 PROCEDURE — 80048 BASIC METABOLIC PNL TOTAL CA: CPT

## 2025-07-05 PROCEDURE — 6360000002 HC RX W HCPCS: Performed by: INTERNAL MEDICINE

## 2025-07-05 PROCEDURE — 2580000003 HC RX 258

## 2025-07-05 PROCEDURE — 6370000000 HC RX 637 (ALT 250 FOR IP): Performed by: INTERNAL MEDICINE

## 2025-07-05 PROCEDURE — 6370000000 HC RX 637 (ALT 250 FOR IP)

## 2025-07-05 PROCEDURE — 83735 ASSAY OF MAGNESIUM: CPT

## 2025-07-05 PROCEDURE — 84100 ASSAY OF PHOSPHORUS: CPT

## 2025-07-05 PROCEDURE — 2700000000 HC OXYGEN THERAPY PER DAY

## 2025-07-05 PROCEDURE — 71045 X-RAY EXAM CHEST 1 VIEW: CPT

## 2025-07-05 PROCEDURE — 85027 COMPLETE CBC AUTOMATED: CPT

## 2025-07-05 PROCEDURE — 99232 SBSQ HOSP IP/OBS MODERATE 35: CPT | Performed by: INTERNAL MEDICINE

## 2025-07-05 PROCEDURE — 82962 GLUCOSE BLOOD TEST: CPT

## 2025-07-05 PROCEDURE — 2060000000 HC ICU INTERMEDIATE R&B

## 2025-07-05 RX ORDER — BENZOCAINE/MENTHOL 6 MG-10 MG
LOZENGE MUCOUS MEMBRANE
Qty: 30 G | Refills: 1 | Status: SHIPPED | OUTPATIENT
Start: 2025-07-05 | End: 2025-07-12

## 2025-07-05 RX ORDER — POTASSIUM CHLORIDE 1500 MG/1
20 TABLET, EXTENDED RELEASE ORAL ONCE
Status: DISCONTINUED | OUTPATIENT
Start: 2025-07-05 | End: 2025-07-06 | Stop reason: HOSPADM

## 2025-07-05 RX ORDER — PROCHLORPERAZINE EDISYLATE 5 MG/ML
10 INJECTION INTRAMUSCULAR; INTRAVENOUS EVERY 6 HOURS PRN
Status: DISCONTINUED | OUTPATIENT
Start: 2025-07-05 | End: 2025-07-06 | Stop reason: HOSPADM

## 2025-07-05 RX ORDER — BENZOCAINE/MENTHOL 6 MG-10 MG
LOZENGE MUCOUS MEMBRANE 2 TIMES DAILY
Status: DISCONTINUED | OUTPATIENT
Start: 2025-07-05 | End: 2025-07-06 | Stop reason: HOSPADM

## 2025-07-05 RX ADMIN — DEXTROSE MONOHYDRATE 250 ML: 100 INJECTION, SOLUTION INTRAVENOUS at 05:24

## 2025-07-05 RX ADMIN — METOPROLOL TARTRATE 25 MG: 25 TABLET, FILM COATED ORAL at 09:32

## 2025-07-05 RX ADMIN — CLOPIDOGREL BISULFATE 75 MG: 75 TABLET, FILM COATED ORAL at 09:32

## 2025-07-05 RX ADMIN — ONDANSETRON 4 MG: 2 INJECTION, SOLUTION INTRAMUSCULAR; INTRAVENOUS at 12:32

## 2025-07-05 RX ADMIN — DIPHENHYDRAMINE HYDROCHLORIDE 25 MG: 25 TABLET ORAL at 13:48

## 2025-07-05 RX ADMIN — PROCHLORPERAZINE EDISYLATE 10 MG: 5 INJECTION INTRAMUSCULAR; INTRAVENOUS at 16:21

## 2025-07-05 ASSESSMENT — PAIN SCALES - GENERAL: PAINLEVEL_OUTOF10: 6

## 2025-07-05 ASSESSMENT — PAIN DESCRIPTION - LOCATION: LOCATION: ABDOMEN

## 2025-07-05 NOTE — FLOWSHEET NOTE
07/05/25 1408   Vital Signs   BP (!) 138/58   Temp 97.6 °F (36.4 °C)   Pulse 93   Respirations 18   Weight - Scale   (floor to weigh)   Post-Hemodialysis Assessment   Post-Treatment Procedures Blood returned;Access bleeding time < 10 minutes   Rinseback Volume (ml) 300 ml   Blood Volume Processed (Liters) 77.1 L   Dialyzer Clearance Clear   Duration of Treatment (minutes) 210 minutes   Hemodialysis Intake (ml) 300 ml   Hemodialysis Output (ml) 2700 ml   NET Removed (ml) 2400   Tolerated Treatment Good   Patient Response to Treatment pt tolerated tx well. pt rinsedback w/ 300cc nss. pt sites held. gauze and tape applied x2 sites. pt taken back to her room.

## 2025-07-05 NOTE — FLOWSHEET NOTE
07/05/25 1410   Vital Signs   BP (!) 138/58   Temp 97.9 °F (36.6 °C)   Pulse 93   Respirations 18   Weight - Scale   (floor to weigh)   Post-Hemodialysis Assessment   Post-Treatment Procedures Blood returned;Access bleeding time < 10 minutes   Machine Disinfection Process Exterior Machine Disinfection   Rinseback Volume (ml) 300 ml   Blood Volume Processed (Liters) 77.1 L   Dialyzer Clearance Lightly streaked   Duration of Treatment (minutes) 210 minutes   Heparin Amount Administered During Treatment (mL) 0 mL   Hemodialysis Intake (ml) 300 ml   Hemodialysis Output (ml) 2700 ml   NET Removed (ml) 2400   Tolerated Treatment Good   Patient Response to Treatment pt hypotensive with aggressive fluid removal. VSS post treatment   Bilateral Breath Sounds Diminished   Physician Notified No   Time Off 1408   Patient Disposition Return to room   Observations & Evaluations   Level of Consciousness 0

## 2025-07-05 NOTE — PLAN OF CARE
Problem: Chronic Conditions and Co-morbidities  Goal: Patient's chronic conditions and co-morbidity symptoms are monitored and maintained or improved  7/5/2025 1133 by Cornelius Simmons RN  Outcome: Progressing  7/5/2025 0352 by Radha Swartz RN  Outcome: Progressing     Problem: Discharge Planning  Goal: Discharge to home or other facility with appropriate resources  7/5/2025 1133 by Cornelius Simmons RN  Outcome: Progressing  7/5/2025 0352 by Radha Swartz RN  Outcome: Progressing     Problem: Pain  Goal: Verbalizes/displays adequate comfort level or baseline comfort level  7/5/2025 1133 by Cornelius Simmons RN  Outcome: Progressing  7/5/2025 0352 by Radha Swartz RN  Outcome: Progressing     Problem: Safety - Adult  Goal: Free from fall injury  7/5/2025 1133 by Cornelius Simmons RN  Outcome: Progressing  7/5/2025 0352 by Radha Swartz RN  Outcome: Progressing     Problem: ABCDS Injury Assessment  Goal: Absence of physical injury  7/5/2025 1133 by Cornelius Simmons RN  Outcome: Progressing  7/5/2025 0352 by Radha Swartz RN  Outcome: Progressing     Problem: Skin/Tissue Integrity  Goal: Skin integrity remains intact  Description: 1.  Monitor for areas of redness and/or skin breakdown  2.  Assess vascular access sites hourly  3.  Every 4-6 hours minimum:  Change oxygen saturation probe site  4.  Every 4-6 hours:  If on nasal continuous positive airway pressure, respiratory therapy assess nares and determine need for appliance change or resting period  Outcome: Progressing

## 2025-07-06 ENCOUNTER — APPOINTMENT (OUTPATIENT)
Dept: GENERAL RADIOLOGY | Age: 40
DRG: 637 | End: 2025-07-06
Payer: MEDICARE

## 2025-07-06 VITALS
OXYGEN SATURATION: 100 % | BODY MASS INDEX: 26.67 KG/M2 | WEIGHT: 132.28 LBS | DIASTOLIC BLOOD PRESSURE: 80 MMHG | HEIGHT: 59 IN | TEMPERATURE: 97.8 F | HEART RATE: 105 BPM | RESPIRATION RATE: 16 BRPM | SYSTOLIC BLOOD PRESSURE: 107 MMHG

## 2025-07-06 LAB
ANION GAP SERPL CALCULATED.3IONS-SCNC: 10 MMOL/L (ref 7–16)
BUN SERPL-MCNC: 9 MG/DL (ref 6–20)
CALCIUM SERPL-MCNC: 9.1 MG/DL (ref 8.6–10)
CHLORIDE SERPL-SCNC: 98 MMOL/L (ref 98–107)
CO2 SERPL-SCNC: 27 MMOL/L (ref 22–29)
CREAT SERPL-MCNC: 3.3 MG/DL (ref 0.5–1)
ERYTHROCYTE [DISTWIDTH] IN BLOOD BY AUTOMATED COUNT: 15.2 % (ref 11.5–15)
GFR, ESTIMATED: 18 ML/MIN/1.73M2
GLUCOSE BLD-MCNC: 268 MG/DL (ref 74–99)
GLUCOSE SERPL-MCNC: 283 MG/DL (ref 74–99)
HCT VFR BLD AUTO: 31 % (ref 34–48)
HGB BLD-MCNC: 10.3 G/DL (ref 11.5–15.5)
MAGNESIUM SERPL-MCNC: 1.8 MG/DL (ref 1.6–2.6)
MCH RBC QN AUTO: 30.1 PG (ref 26–35)
MCHC RBC AUTO-ENTMCNC: 33.2 G/DL (ref 32–34.5)
MCV RBC AUTO: 90.6 FL (ref 80–99.9)
MICROORGANISM SPEC CULT: NORMAL
MICROORGANISM SPEC CULT: NORMAL
PHOSPHATE SERPL-MCNC: 2 MG/DL (ref 2.5–4.5)
PLATELET # BLD AUTO: 193 K/UL (ref 130–450)
PMV BLD AUTO: 10.1 FL (ref 7–12)
POTASSIUM SERPL-SCNC: 4.1 MMOL/L (ref 3.5–5.1)
RBC # BLD AUTO: 3.42 M/UL (ref 3.5–5.5)
SERVICE CMNT-IMP: NORMAL
SERVICE CMNT-IMP: NORMAL
SODIUM SERPL-SCNC: 135 MMOL/L (ref 136–145)
SPECIMEN DESCRIPTION: NORMAL
SPECIMEN DESCRIPTION: NORMAL
WBC OTHER # BLD: 6.7 K/UL (ref 4.5–11.5)

## 2025-07-06 PROCEDURE — 6370000000 HC RX 637 (ALT 250 FOR IP): Performed by: INTERNAL MEDICINE

## 2025-07-06 PROCEDURE — 82962 GLUCOSE BLOOD TEST: CPT

## 2025-07-06 PROCEDURE — 99239 HOSP IP/OBS DSCHRG MGMT >30: CPT | Performed by: INTERNAL MEDICINE

## 2025-07-06 PROCEDURE — 80048 BASIC METABOLIC PNL TOTAL CA: CPT

## 2025-07-06 PROCEDURE — 83735 ASSAY OF MAGNESIUM: CPT

## 2025-07-06 PROCEDURE — 71045 X-RAY EXAM CHEST 1 VIEW: CPT

## 2025-07-06 PROCEDURE — 2700000000 HC OXYGEN THERAPY PER DAY

## 2025-07-06 PROCEDURE — 84100 ASSAY OF PHOSPHORUS: CPT

## 2025-07-06 PROCEDURE — 36415 COLL VENOUS BLD VENIPUNCTURE: CPT

## 2025-07-06 PROCEDURE — 85027 COMPLETE CBC AUTOMATED: CPT

## 2025-07-06 RX ADMIN — INSULIN LISPRO 2 UNITS: 100 INJECTION, SOLUTION INTRAVENOUS; SUBCUTANEOUS at 09:06

## 2025-07-06 RX ADMIN — CLOPIDOGREL BISULFATE 75 MG: 75 TABLET, FILM COATED ORAL at 09:06

## 2025-07-06 RX ADMIN — PANTOPRAZOLE SODIUM 40 MG: 40 TABLET, DELAYED RELEASE ORAL at 05:33

## 2025-07-06 RX ADMIN — METOPROLOL TARTRATE 25 MG: 25 TABLET, FILM COATED ORAL at 09:06

## 2025-07-06 NOTE — PLAN OF CARE
Problem: Chronic Conditions and Co-morbidities  Goal: Patient's chronic conditions and co-morbidity symptoms are monitored and maintained or improved  7/6/2025 0035 by Ellie Guardado RN  Outcome: Progressing  7/5/2025 1133 by Cornelius Simmons RN  Outcome: Progressing     Problem: Discharge Planning  Goal: Discharge to home or other facility with appropriate resources  7/6/2025 0035 by Ellie Guardado RN  Outcome: Progressing  7/5/2025 1133 by Cornelius Simmons RN  Outcome: Progressing     Problem: Pain  Goal: Verbalizes/displays adequate comfort level or baseline comfort level  7/6/2025 0035 by Ellie Guardado RN  Outcome: Progressing  7/5/2025 1133 by Cornelius Simmons RN  Outcome: Progressing     Problem: Safety - Adult  Goal: Free from fall injury  7/6/2025 0035 by Ellie Guardado RN  Outcome: Progressing  7/5/2025 1133 by Cornelius Simmons RN  Outcome: Progressing     Problem: ABCDS Injury Assessment  Goal: Absence of physical injury  7/6/2025 0035 by Ellie Guardado RN  Outcome: Progressing  7/5/2025 1133 by Cornelius Simmons RN  Outcome: Progressing     Problem: Skin/Tissue Integrity  Goal: Skin integrity remains intact  Description: 1.  Monitor for areas of redness and/or skin breakdown  2.  Assess vascular access sites hourly  3.  Every 4-6 hours minimum:  Change oxygen saturation probe site  4.  Every 4-6 hours:  If on nasal continuous positive airway pressure, respiratory therapy assess nares and determine need for appliance change or resting period  7/6/2025 0035 by Ellie Guardado RN  Outcome: Progressing  7/5/2025 1133 by Cornelius Simmons RN  Outcome: Progressing

## 2025-07-06 NOTE — CARE COORDINATION
7/6:  Update CM Note:  CM called floor to advise to just call PAS when pt is ready,  the floor called & stated CM can set up.  CM set up for 10am today.  CM advise floor & pt of dc & transportation.  Electronically signed by Gabriela Chawla RN on 7/6/2025 at 9:06 AM    
Care Coordination: LOS 4 day. Call to pt, she was not aware she was discharged and she is reaching out to nursing as she just returned from HD and not feeling well and c/o Diarrhea.  DC order is noted, as well as not from Previous CM.   Apparently there is a need to  her wc at [facility] on [address] Phone: [phone] - multiple calls and no answer and left 2 vm.  Also confirmed that pt does not use Provide a ride to go home, as she states she needs ambulette transport through physicians. Unclear if physicians will stop at [facility] also , unclear if I can get provide a ride in time to see if they will swing past [facility].  I asked the pt if family may have picked up WC, she asked to call her Aunt Danuta. ADDENDUM Spoke to Aunt and she did  wc.  Ambulette set up for  at 6:00 pm, spoke to Amaya at [facility] . Confirmed with pt that she lives in ranch home, no steps. Spoke to Micky RODRIGUEZ and updated him--  Apparently dc was now held by Dr Ellington.   I called back physicians, spoke to Nick and placed on will call for tomorrow--- Nursing will need to call [facility] ambulance to set up at . Pt was placed on will call for tomorrow       Electronically signed by Nunu Bingham RN on 7/5/2025 at 3:22 PM                                 
Care Coordination: Readmission. Sent from Dialysis , did not finish tx, lethargic, sob and bgl 597. Admit MICU, acute on chronic resp failure, ac pulm edema, DKA- mild.  Met with pt at bedside to discuss transition of care needs.. As previous admission, she lives with disabled daughter, who has a care giver. Care giver cares for child at her home while she is in hospital.   02 and nebulizer through [employer], walker.  Active with [facility]. HD [facility] eunice T-T- S.  Uses ambulette to transport home. She states she has been trying to get into see endocrine but they keep delaying her apt.  I called and spoke to Radha-- apt was not delayed-- it was scheduled on May 6th during her last visit- Apt is set for Sept 9 at 12:40. Pt did follow with pcp on 6/25/25. Plan is home at discharge  ADDENDUM: Careport referral for [facility] sent.    Electronically signed by Nunu Bingham RN on 7/2/2025 at 12:32 PM     The Plan for Transition of Care is related to the following treatment goals: dc    The Patient  was provided with a choice of provider and agrees   with the discharge plan. [x] Yes [] No    Freedom of choice list was provided with basic dialogue that supports the patient's individualized plan of care/goals, treatment preferences and shares the quality data associated with the providers. [x] Yes [] No   
Patient's foldable wheelchair is at [facility] at [address] [place] [address]. When she calls for her wheelchair ride home through her [employer], they will need to stop at [facility] to pick-up her wheelchair.    [facility] is open the following hours:   July 4th (Friday): 4:30AM-4PM  July 5th (Sat.): 4:30AM-4PM  July 6th (Sun): closed  July 7th (Monday): 4:30AM-5PM    [facility] also gave her aunt their hours of operation as she may pick-up the wheelchair prior to her dicharge.    
Transition of care. Chart reviewed  TX  from MICU. Here for ESRD on dialysis, diabetes mellitus, hypertension, and anemia sent in from dialysis  She was drowsy at dialysis and they checked lab found to have high anion gap, high sugar sent here for evaluation and treatment. Nephrology following Patient currently in dialysis Blood glucose level this am  447 Per prior cm note-she lives with disabled daughter, who has a care giver. Care giver cares for child at her home while she is in hospital. 02 and nebulizer through [employer], walker. Active with [facility] for NS only ([facility] orders are in ) . HD [facility] T-T- S. Uses ambulette to transport home Plan is home at discharge Await PT OT Electronically signed by Isabel Young RN on 7/3/2025 at 11:49 AM    Met with patient to discuss discharge. Her plan is to return home however, Her wc that  she needs for home is @  HD [facility]. Called [facility]-   unable to bring wc to hospital. Patient uses [employer] [facility] ph  for transport. Above discussed with Toshia ORDONEZ. Discharge will need to be coordinated when HD [facility] is open and the transport will need to stop @ [facility] and  get her wc for home. The wc transport needs to be ble to have  left leg elevated as she has a brace for the leg. See note from Toshia ORDONEZ  in chart regarding [facility].Electronically signed by Isabel Young RN on 7/3/2025 at 3:09 PM            
[E10.11]  Hypervolemia, unspecified hypervolemia type [E87.70]  Nausea and vomiting, unspecified vomiting type [R11.2]    IF APPLICABLE: The Patient and/or patient representative Michelle and her family were provided with a choice of provider and agrees with the discharge plan. Freedom of choice list with basic dialogue that supports the patient's individualized plan of care/goals and shares the quality data associated with the providers was provided to:     Patient Representative Name:       The Patient and/or Patient Representative Agree with the Discharge Plan?      Nunu Bingham RN  Case Management Department  Ph: [phone]:

## 2025-07-07 ENCOUNTER — TELEPHONE (OUTPATIENT)
Dept: PRIMARY CARE CLINIC | Age: 40
End: 2025-07-07

## 2025-07-07 NOTE — TELEPHONE ENCOUNTER
Care Transitions Initial Follow Up Call    Outreach made within 2 business days of discharge: Yes    Patient: Michelle Nettles Patient : 1985   MRN: [id number]  Reason for Admission: Diabetic ketoacidosis with coma associated with type 1 diabetes mellitus   Discharge Date: 25       Spoke with: Patient    Discharge department/facility: [facility] Patient Contact:  Was patient able to fill all prescriptions: Yes  Was patient instructed to bring all medications to the follow-up visit: Yes  Is patient taking all medications as directed in the discharge summary? Yes  Does patient understand their discharge instructions: Yes  Does patient have questions or concerns that need addressed prior to 7-14 day follow up office visit: no    Additional needs identified to be addressed with provider  No needs identified             Scheduled appointment with PCP within 7-14 days    Follow Up  Future Appointments   Date Time Provider Department Center   2025 10:30 AM SCHEDULE, SE ORTHO APC SE [facility]   2025  1:30 PM SCHEDULE, [facility] VIRTUAL [facility]   2025 12:40 PM Angelina Benitez APRN - CNP BDM ENDO [facility]   12/15/2025 10:15 AM Haylee Zaidi MD VAS/MED [facility]       LUDIN HANDY MA

## 2025-07-07 NOTE — TELEPHONE ENCOUNTER
Name of Medication(s) Requested:  Requested Prescriptions     Pending Prescriptions Disp Refills    loratadine (CLARITIN) 10 MG tablet [Pharmacy Med Name: ALLERGY RELF (LORATADINE) 10MG TABS] 45 tablet 1     Sig: TAKE 1 TABLET BY MOUTH EVERY OTHER DAY       Medication is on current medication list Yes    Dosage and directions were verified? Yes    Quantity verified: 30 day supply     Pharmacy Verified?  Yes    Last Appointment:  6/25/2025    Future appts:  Future Appointments   Date Time Provider Department Center   7/16/2025 10:30 AM SCHEDULE,  ORTHO APC  Ortho [facility]   7/17/2025  1:30 PM SCHEDULE, [facility] VIRTUAL [facility] SP[facility]   9/9/2025 12:40 PM Angelina Benitez APRN - CNP BD ENDO [facility]   12/15/2025 10:15 AM Haylee Zaidi MD [facility]/MED [facility]        (If no appt send self scheduling link. .[email or web address])  Scheduling request sent?     [] Yes  [] No    Does patient need updated?  [] Yes  [] No

## 2025-07-08 DIAGNOSIS — S82.852A TRIMALLEOLAR FRACTURE OF ANKLE, CLOSED, LEFT, INITIAL ENCOUNTER: Primary | ICD-10-CM

## 2025-07-08 RX ORDER — LORATADINE 10 MG/1
10 TABLET ORAL EVERY OTHER DAY
Qty: 45 TABLET | Refills: 1 | Status: SHIPPED | OUTPATIENT
Start: 2025-07-08

## 2025-07-08 NOTE — PROGRESS NOTES
[facility] Hospitalist Physician Discharge Summary       No follow-up provider specified.    Activity level:  As tolerated    Dispo: Home      Condition on discharge:  Stable    Patient ID:  Michelle Nettles  [id number]  40 y.o.  1985    Admit date: 7/1/2025    Discharge date and time:  07/06/2025  9:08 AM    Admission Diagnoses: Principal Problem:    DKA, type 1, not at goal (HCC)  Resolved Problems:    * No resolved hospital problems. *      Discharge Diagnoses: Principal Problem:    DKA, type 1, not at goal (HCC)  Resolved Problems:    * No resolved hospital problems. *      Consults:  IP CONSULT TO DIABETES EDUCATOR  IP CONSULT TO INTERNAL MEDICINE  IP CONSULT TO NEPHROLOGY  IP CONSULT TO CRITICAL CARE  IP CONSULT TO DIABETES EDUCATOR  IP CONSULT TO VASCULAR ACCESS TEAM    Procedures:     Hospital Course:   Patient Michelle Nettles is a 40 y.o. presented with Supplemental oxygen dependent [Z99.81]  ESRD on hemodialysis (HCC) [N18.6, Z99.2]  DKA, type 1, not at goal (HCC) [E10.10]  High anion gap metabolic acidosis [E87.29]  Elevated lactic acid level [R79.89]  Diabetic ketoacidosis with coma associated with type 1 diabetes mellitus (HCC) [E10.11]  Hypervolemia, unspecified hypervolemia type [E87.70]  Nausea and vomiting, unspecified vomiting type [R11.2]  This is 40-year-old -American female with past medical history of ESRD on dialysis, diabetes mellitus, hypertension, and anemia went for dialysis today. She was drowsy at dialysis and they checked lab found to have high anion gap, high sugar sent here for evaluation and treatment. Patient was not forthcoming with her history because she was drowsy. She endorses she was using oxygen via nasal cannula 5 L at home. Her vitals in ER blood pressure 184/90, pulse 111, respiration 22 and temperature 98.9 °F. Lactic acid level 6.6, sodium 133, potassium 3.4 and bicarb 16 and BUN 31 creatinine 9.9 anion gap 29.     A/P:    1.  
         Critical Care Team - Daily Progress Note         Date and time: 2025 10:39 AM  Patient's name:  Michelle Nettles  Medical Record Number: [id number]  Patient's account/billing number: [id number]  Patient's YOB: 1985  Age: 40 y.o.  Date of Admission: 2025 12:48 PM  Length of stay during current admission: 1      Primary Care Physician: Rogers Rodríguez MD  ICU Attending Physician:      Code Status: Full Code    Reason for ICU admission: mild DKA, acute on chronic resp failure, pulmonary edema       SUBJECTIVE:     OVERNIGHT EVENTS:       doing much better today.  S/p dialysis overnight.  Glucose and anion gap are improved.  She is hungry and requesting to eat.       CURRENT VENTILATION STATUS:     [] Ventilator  [] BIPAP  [x] Nasal Cannula [] Room Air      IF INTUBATED, ET TUBE MARKING AT LOWER LIP:       cms    SECRETIONS Amount:  [] Small [] Moderate  [] Large  [x] None  Color:     [] White [] Colored  [] Bloody    SEDATION:  RAAS Score:  [] Propofol gtt  [] Versed gtt  [] Ativan gtt   [x] No Sedation    PARALYZED:  [x] No    [] Yes      VASOPRESSORS:  [x] No    [] Yes    If yes -   [] Levophed       [] Dopamine     [] Vasopressin       [] Dobutamine  [] Phenylephrine         [] Epinephrine    CENTRAL LINES:     [x] No   [] Yes   (Date of Insertion:   )           If yes -     [] Right IJ     [] Left IJ [] Right Femoral [] Left Femoral                   [] Right Subclavian [] Left Subclavian       PASTRANA'S CATHETER:   [x] No   [] Yes  (Date of Insertion:   )     URINE OUTPUT:            [] Good   [] Low              [x] Anuric      OBJECTIVE:     VITAL SIGNS:  BP (!) 154/76   Pulse (!) 105   Temp 98 °F (36.7 °C) (Oral)   Resp 26   Ht 1.499 m (4' 11\")   Wt 57 kg (125 lb 10.6 oz)   LMP  (LMP Unknown)   SpO2 99%   BMI 25.38 kg/m²   Tmax over 24 hours:  Temp (24hrs), Av °F (36.7 °C), Min:97.5 °F (36.4 °C), Max:98.9 °F (37.2 °C)      Patient Vitals for the past 6 
       [facility] Hospitalist Progress Note    Admitting Date and Time: 7/1/2025 12:48 PM  Admit Dx: Supplemental oxygen dependent [Z99.81]  ESRD on hemodialysis (HCC) [N18.6, Z99.2]  DKA, type 1, not at goal (HCC) [E10.10]  High anion gap metabolic acidosis [E87.29]  Elevated lactic acid level [R79.89]  Diabetic ketoacidosis with coma associated with type 1 diabetes mellitus (HCC) [E10.11]  Hypervolemia, unspecified hypervolemia type [E87.70]  Nausea and vomiting, unspecified vomiting type [R11.2]    Subjective:  Patient is being followed for Supplemental oxygen dependent [Z99.81]  ESRD on hemodialysis (HCC) [N18.6, Z99.2]  DKA, type 1, not at goal (HCC) [E10.10]  High anion gap metabolic acidosis [E87.29]  Elevated lactic acid level [R79.89]  Diabetic ketoacidosis with coma associated with type 1 diabetes mellitus (HCC) [E10.11]  Hypervolemia, unspecified hypervolemia type [E87.70]  Nausea and vomiting, unspecified vomiting type [R11.2]   Pt feels  okay and ready to go.    ROS: denies fever, chills, cp, sob, n/v, HA unless stated above.      hydrocortisone   Topical BID    potassium chloride  20 mEq Oral Once    insulin glargine  15 Units SubCUTAneous Nightly    insulin lispro  0-4 Units SubCUTAneous 4x Daily AC & HS    pantoprazole  40 mg Oral QAM AC    clopidogrel  75 mg Oral Daily    docusate sodium  100 mg Oral Daily    gabapentin  300 mg Oral Once per day on Tuesday Thursday Saturday    metoprolol tartrate  25 mg Oral BID    albuterol  2.5 mg Nebulization Q6H     ondansetron, 4 mg, Q6H PRN  glucose, 4 tablet, PRN  dextrose bolus, 125 mL, PRN   Or  dextrose bolus, 250 mL, PRN  glucagon (rDNA), 1 mg, PRN  dextrose, , Continuous PRN  acetaminophen, 650 mg, Q6H PRN  sodium chloride, , PRN  diphenhydrAMINE, 25 mg, Q6H PRN  traMADol, 50 mg, Q6H PRN  magnesium sulfate, 2,000 mg, PRN  sodium phosphate 15 mmol in sodium chloride 0.9 % 250 mL IVPB, 15 mmol, PRN  dextrose 5% and 0.45% NaCl with KCl 20 mEq, , 
       [facility] Hospitalist Progress Note    Admitting Date and Time: 7/1/2025 12:48 PM  Admit Dx: Supplemental oxygen dependent [Z99.81]  ESRD on hemodialysis (HCC) [N18.6, Z99.2]  DKA, type 1, not at goal (HCC) [E10.10]  High anion gap metabolic acidosis [E87.29]  Elevated lactic acid level [R79.89]  Diabetic ketoacidosis with coma associated with type 1 diabetes mellitus (HCC) [E10.11]  Hypervolemia, unspecified hypervolemia type [E87.70]  Nausea and vomiting, unspecified vomiting type [R11.2]    Subjective:  Patient is being followed for Supplemental oxygen dependent [Z99.81]  ESRD on hemodialysis (HCC) [N18.6, Z99.2]  DKA, type 1, not at goal (HCC) [E10.10]  High anion gap metabolic acidosis [E87.29]  Elevated lactic acid level [R79.89]  Diabetic ketoacidosis with coma associated with type 1 diabetes mellitus (HCC) [E10.11]  Hypervolemia, unspecified hypervolemia type [E87.70]  Nausea and vomiting, unspecified vomiting type [R11.2]   Pt feels week, no shortness of breath.      ROS: denies fever, chills, cp, sob, n/v, HA unless stated above.      insulin glargine  5 Units SubCUTAneous Daily    insulin lispro  0-4 Units SubCUTAneous 4x Daily AC & HS    [START ON 7/3/2025] pantoprazole  40 mg Oral QAM AC    clopidogrel  75 mg Oral Daily    docusate sodium  100 mg Oral Daily    [START ON 7/3/2025] gabapentin  300 mg Oral Once per day on Tuesday Thursday Saturday    metoprolol tartrate  25 mg Oral BID    albuterol  2.5 mg Nebulization Q6H     acetaminophen, 650 mg, Q6H PRN  sodium chloride, , PRN  dextrose bolus, 125 mL, PRN   Or  dextrose bolus, 250 mL, PRN  potassium chloride, 10 mEq, PRN  magnesium sulfate, 2,000 mg, PRN  sodium phosphate 15 mmol in sodium chloride 0.9 % 250 mL IVPB, 15 mmol, PRN  dextrose 5% and 0.45% NaCl with KCl 20 mEq, , Continuous PRN  hydrALAZINE, 10 mg, Q6H PRN  labetalol, 10 mg, Q6H PRN         Objective:    BP (!) 140/89   Pulse (!) 103   Temp 98.4 °F (36.9 °C) (Axillary)   Resp 
.  
4 Eyes Skin Assessment     NAME:  Michelle Netltes  YOB: 1985  MEDICAL RECORD NUMBER:  [id number]    The patient is being assessed for  Admission    I agree that at least one RN has performed a thorough Head to Toe Skin Assessment on the patient. ALL assessment sites listed below have been assessed.      Areas assessed by both nurses:    Head, Face, Ears, Shoulders, Back, Chest, Arms, Elbows, Hands, Sacrum. Buttock, Coccyx, Ischium, Legs. Feet and Heels, and Under Medical Devices         Does the Patient have a Wound? No noted wound(s)       Edgar Prevention initiated by RN: No  Wound Care Orders initiated by RN: No    Pressure Injury (Stage 1,2,3,4, Unstageable, DTI, NWPT, and Complex wounds) if present, place Wound referral order by RN under : No    New Ostomies, if present place, Ostomy referral order under : No     Nurse 1 eSignature: Electronically signed by Monica Cummins RN on 7/2/25 at 10:31 PM EDT    **SHARE this note so that the co-signing nurse can place an eSignature**    Nurse 2 eSignature: Electronically signed by Beatriz Mckinnon RN on 7/2/25 at 10:34 PM EDT   
4 Eyes Skin Assessment     NAME:  Michelle Nettles  YOB: 1985  MEDICAL RECORD NUMBER:  [id number]    The patient is being assessed for  Admission    I agree that at least one RN has performed a thorough Head to Toe Skin Assessment on the patient. ALL assessment sites listed below have been assessed.      Areas assessed by both nurses:    Head, Face, Ears, Shoulders, Back, Chest, Arms, Elbows, Hands, Sacrum. Buttock, Coccyx, Ischium, Legs. Feet and Heels, and Under Medical Devices         Does the Patient have a Wound? No noted wound(s)       Edgar Prevention initiated by RN: Yes  Wound Care Orders initiated by RN: No    Pressure Injury (Stage 1,2,3,4, Unstageable, DTI, NWPT, and Complex wounds) if present, place Wound referral order by RN under : No    New Ostomies, if present place, Ostomy referral order under : No     Nurse 1 eSignature: Electronically signed by LEON MARSH RN on 7/1/25 at 6:23 PM EDT    **SHARE this note so that the co-signing nurse can place an eSignature**    Nurse 2 eSignature: Electronically signed by Nan Styles RN on 7/1/25 at 6:28 PM EDT    
8 oz apple juice given for bs 47 when pt returns from dialysis  
Consult received for diabetes education. Chart reviewed. Pt seen by me 5/27/25 and again 6/20/25 for DKA admit. Nursing notified. Consult will be completed at this time.     Electronically signed by Luz Marina Santiago RN on 7/2/2025 at 9:41 AM    
Do not give one time dose of potassium chloride per Dr. Gerard.  
Labs drawn right hand. Pt tolerated well.  
Message sent to Dr. Ellington for medication for hemorrhoids.  
Message sent to Dr. Ellington regarding patient's request for an ortho consult.  
Message sent to Dr. Gerard asking if patient will have dialysis today.   
Message sent to IV team for lab draws.   
Messaged Shraddha Avitia NP about complaining of pain 8 out of 10 on her left knee after Tylenol.  
Messaged Shraddha Avitia about patient is complaining of itching and asking for Benadryl.  
Notified Dr. Ellington that this patient could not be discharged today due to transport complications.   
Notified Dr. Ellington, pt repeat  blood glucose 409. Orders given for 8 units Humalog once per Dr. Ellington.  
Notified Dr. Gerard that the patient is requesting to have dialysis treatment today as she will not be able to go tomorrow because she will have her daughter.   
Notified Shraddha Avitia NP about patient morning blood sugar of 447.  
OCCUPATIONAL THERAPY INITIAL EVALUATION    [facility]  [address], [place]          Date:2025                                                   Patient Name: Michelle Nettles     MRN: [id number]     : 1985     Room: [facility]8508-       Evaluating OT: Betsy Alvarez OTD, OTR/L, [id number]      Referring Provider: Priya Lainez MD     Specific Provider Orders/Date: OT eval and treat (25)    Diagnosis: Supplemental oxygen dependent [Z99.81]  ESRD on hemodialysis (HCC) [N18.6, Z99.2]  DKA, type 1, not at goal (HCC) [E10.10]  High anion gap metabolic acidosis [E87.29]  Elevated lactic acid level [R79.89]  Diabetic ketoacidosis with coma associated with type 1 diabetes mellitus (HCC) [E10.11]  Hypervolemia, unspecified hypervolemia type [E87.70]  Nausea and vomiting, unspecified vomiting type [R11.2]    Surgeries/Procedures: None this admission       Pt admitted with DKA, SOB, hx of ESRD on HD.      Pertinent Medical History:       has a past medical history of JOLLY (acute kidney injury), Anemia, AVF (arteriovenous fistula), Cellulitis, face, CHF (congestive heart failure) ([facility]), Chronic kidney disease, Chronic respiratory failure with hypoxia ([facility]), Dialysis AV fistula malfunction, initial encounter, Displaced trimalleolar fracture of left lower leg, closed, initial encounter, DM type 1 (diabetes mellitus, type 1) ([facility]), Encounter regarding vascular access for dialysis for ESRD ([facility]), ESRD (end stage renal disease) ([facility]), Hemodialysis patient, Hidradenitis suppurativa, Hypercalcemia, Hyperkalemia, Hypertension, Iron deficiency anemia secondary to blood loss (chronic), Other acute gastritis without hemorrhage, Tobacco abuse, Type 2 diabetes mellitus with hyperglycemia ([facility]), and Vitamin B-complex deficiency.         Precautions:  Fall Risk, alarms, NWB LLE, hinged knee brace 0-30 degrees, contact isolation (MRSA), HD     Assessment 
OT SESSION ATTEMPT     Date:7/3/2025  Patient Name: Michelle Nettles  MRN: [id number]  : 1985  Room: [address] [place] [address]     Occupational therapy orders received/chart review completed and OT session attempted this date:   RN hold 2/2 low blood sugar upon return from HD       Will reattempt OT at a later time/date.  Thank you,   Jose Flores OTR/L [id number]    
OT SESSION ATTEMPT     Date:7/3/2025  Patient Name: Michelle Nettles  MRN: [id number]  : 1985  Room: [address] [place] [address]     Occupational therapy orders received/chart review completed and OT session attempted this date:   Pt off the floor for HD       Will reattempt OT at a later time/date.  Thank you,   Jose Flores OTR/L [id number]    
Patient admitted to MICU with the following belongings: shirt, socks, undergarments, bonnet, leg brace, (1) shoe, bag, blanket, wallet, snacks, keys, glucometer, insulin, phone, charging cord. The following belongings were sent home with the patient's family:  None - belongings noted on admission remain in patient's room..  
Physical Therapy        PT orders received and medical chart reviewed. RN hold 2/2 low blood sugar upon return from HD. Will re attempt at a later date/ time. Thank you.    Shade Vegas, PT, DPT  [id number]       
Physical Therapy   Initial Assessment       Name: Michelle Nettles  : 1985  MRN: [id number]      Date of Service: 2025    Evaluating PT:  Chris Davis PT, DPT  [id number]    Room #:  8508/8508-B  Diagnosis:  Supplemental oxygen dependent [Z99.81]  ESRD on hemodialysis (HCC) [N18.6, Z99.2]  DKA, type 1, not at goal (HCC) [E10.10]  High anion gap metabolic acidosis [E87.29]  Elevated lactic acid level [R79.89]  Diabetic ketoacidosis with coma associated with type 1 diabetes mellitus (HCC) [E10.11]  Hypervolemia, unspecified hypervolemia type [E87.70]  Nausea and vomiting, unspecified vomiting type [R11.2]  PMHx/PSHx:   has a past medical history of JOLLY (acute kidney injury), Anemia, AVF (arteriovenous fistula), Cellulitis, face, CHF (congestive heart failure) ([facility]), Chronic kidney disease, Chronic respiratory failure with hypoxia ([facility]), Dialysis AV fistula malfunction, initial encounter, Displaced trimalleolar fracture of left lower leg, closed, initial encounter, DM type 1 (diabetes mellitus, type 1) ([facility]), Encounter regarding vascular access for dialysis for ESRD ([facility]), ESRD (end stage renal disease) ([facility]), Hemodialysis patient, Hidradenitis suppurativa, Hypercalcemia, Hyperkalemia, Hypertension, Iron deficiency anemia secondary to blood loss (chronic), Other acute gastritis without hemorrhage, Tobacco abuse, Type 2 diabetes mellitus with hyperglycemia ([facility]), and Vitamin B-complex deficiency.  Procedure/Surgery:  none this admission   Precautions:  falls, alarms, NWB LLE, HKB 0-30 degrees, contact isolation   Equipment Needs:  none     SUBJECTIVE:    Pt lives with daughter in a 1 story home with level entry.  Bed is on 1 floor and bath is on 1 floor.  Pt reports transfering herself into in W/C independently PTA.  Equipment Owned: W/C    OBJECTIVE:   Initial Evaluation  Date: 25 Treatment Short Term/ Long Term   Goals   AM-PAC 6 Clicks      Was pt agreeable to Eval/treatment? Yes      Does pt 
Pt given 4 oz apple juice for blood sugar of 72  
Pt having diarrhea and abdominal pain. Per Dr. Ellington, pt will not discharge today.  
Renal Dose Adjustment Policy (Generic)     This patient is on medication that requires renal, weight, and/or indication dose adjustment.      Date Body Weight IBW  Adjusted BW SCr  CrCl Dialysis status   7/2/2025 57 kg (125 lb 10.6 oz) Ideal body weight: 48.8 kg (107 lb 8.4 oz)  Adjusted ideal body weight: 52.1 kg (114 lb 12.5 oz) Serum creatinine: 5.9 mg/dL (H) 07/02/25 0649  Estimated creatinine clearance: 10 mL/min (A) HD       Pharmacy has dose-adjusted the following medication(s):    Date Previous Order Adjusted Order   7/2/2025 Gabapentin 300mg QD Gabapentin 300mg three times a week after HD       These changes were made per protocol according to the [facility]   Automatic Renal Dose Adjustment Policy.     *Please note this dose may need readjusted if patient's condition changes.    Please contact pharmacy with any questions regarding these changes.    Deana Acosta [facility]  7/2/2025  9:32 AM    
The  visited the patient who was not available due to dialysis being given in the patient's room. The  did provided a short prayer for the patient outside of the room. The  will follow up as needed. .d  
[facility]  Nephrology Progress Note    Patient's Name: Michelle Nettles    History of Present Illness from 7/1 consult note:    \"A full consult is deferred as patient is well-known to our service.  Briefly, Michelle Nettles is a 40 y.o. female with a past medical history of CHF, ESRD, hypertension, and diabetes mellitus.  She presented to the ED on 7/1 reportedly for concerns of shortness of breath.  Vital signs on 7/1 includes temperature 98.9, respirations 28, pulse 120, /90.  Lab data on 7/1 include sodium 133, potassium 3.4, CO2 16, BUN 31, creatinine 9.9, anion gap 29, lactic acid 7.4, glucose 629, procalcitonin 2.03, troponin 174, proBNP 30,942, beta hydroxybutyrate 2.94, and hemoglobin 7.3.  Her respiratory panel was negative.  She had a chest x-ray on 7/1 which showed worsening increased interstitial markings throughout the lung fields bilaterally, pleural effusions are slightly increased in size.  She is for transfer to ICU.  Patient is known to our service and dialyzes as an outpatient at [facility].  At present, patient was seen and examined.  She is lethargic, breathing appears labored.\"    Subjective:    7/3/2025: Patient was seen and examined.  She is lethargic, no acute distress, she notes that her breathing is better.  She had HD yesterday with 2.5 L net removed.     PMH:    Past Medical History:   Diagnosis Date    JOLLY (acute kidney injury) 04/05/2016    Anemia     AVF (arteriovenous fistula) 02/19/2018    let arm    Cellulitis, face     CHF (congestive heart failure) ([facility])     Chronic kidney disease     Chronic respiratory failure with hypoxia ([facility])     Dialysis AV fistula malfunction, initial encounter 11/07/2019    Displaced trimalleolar fracture of left lower leg, closed, initial encounter 02/22/2025    DM type 1 (diabetes mellitus, type 1) ([facility])     Encounter regarding vascular access for dialysis for ESRD ([facility]) 07/12/2018    ESRD (end stage renal disease) ([facility]) 
[facility]  Nephrology Progress Note    Patient's Name: Michelle Nettles    History of Present Illness from 7/1 consult note:    \"A full consult is deferred as patient is well-known to our service.  Briefly, Michelle Nettles is a 40 y.o. female with a past medical history of CHF, ESRD, hypertension, and diabetes mellitus.  She presented to the ED on 7/1 reportedly for concerns of shortness of breath.  Vital signs on 7/1 includes temperature 98.9, respirations 28, pulse 120, /90.  Lab data on 7/1 include sodium 133, potassium 3.4, CO2 16, BUN 31, creatinine 9.9, anion gap 29, lactic acid 7.4, glucose 629, procalcitonin 2.03, troponin 174, proBNP 30,942, beta hydroxybutyrate 2.94, and hemoglobin 7.3.  Her respiratory panel was negative.  She had a chest x-ray on 7/1 which showed worsening increased interstitial markings throughout the lung fields bilaterally, pleural effusions are slightly increased in size.  She is for transfer to ICU.  Patient is known to our service and dialyzes as an outpatient at [facility].  At present, patient was seen and examined.  She is lethargic, breathing appears labored.\"    Subjective:    7/4/2025: Patient was seen and examined.  She notes that she feels tired overall.  She reports her breathing is better.  She had HD yesterday with 3.6 L net removed.  She is for possible discharge today.    PMH:    Past Medical History:   Diagnosis Date    JOLLY (acute kidney injury) 04/05/2016    Anemia     AVF (arteriovenous fistula) 02/19/2018    let arm    Cellulitis, face     CHF (congestive heart failure) ([facility])     Chronic kidney disease     Chronic respiratory failure with hypoxia ([facility])     Dialysis AV fistula malfunction, initial encounter 11/07/2019    Displaced trimalleolar fracture of left lower leg, closed, initial encounter 02/22/2025    DM type 1 (diabetes mellitus, type 1) ([facility])     Encounter regarding vascular access for dialysis for ESRD ([facility]) 07/12/2018 
[facility]  Nephrology Progress Note    Patient's Name: Michelle Nettles    Reason for Consult:  DKA, missed dialysis     Chief Complaint: Shortness of breath    History of Present Illness:    7/1/25: A full consult is deferred as patient is well-known to our service.  Briefly, Michelle Nettles is a 40 y.o. female with a past medical history of CHF, ESRD, hypertension, and diabetes mellitus.  She presented to the ED on 7/1 reportedly for concerns of shortness of breath.  Vital signs on 7/1 includes temperature 98.9, respirations 28, pulse 120, /90.  Lab data on 7/1 include sodium 133, potassium 3.4, CO2 16, BUN 31, creatinine 9.9, anion gap 29, lactic acid 7.4, glucose 629, procalcitonin 2.03, troponin 174, proBNP 30,942, beta hydroxybutyrate 2.94, and hemoglobin 7.3.  Her respiratory panel was negative.  She had a chest x-ray on 7/1 which showed worsening increased interstitial markings throughout the lung fields bilaterally, pleural effusions are slightly increased in size.  She is for transfer to ICU.  Patient is known to our service and dialyzes as an outpatient at [facility]. She had an hour or two of dialysis today.   At present, patient was seen and examined.  She is lethargic, breathing appears labored.    Interval History:    7/2/25: pt seen and examined in icu. Sp hd yesterday for 2 hours, a little more alert today. Dw nursing    PMH:    Past Medical History:   Diagnosis Date    JOLLY (acute kidney injury) 04/05/2016    Anemia     AVF (arteriovenous fistula) 02/19/2018    let arm    Cellulitis, face     CHF (congestive heart failure) ([facility])     Chronic kidney disease     Chronic respiratory failure with hypoxia ([facility])     Dialysis AV fistula malfunction, initial encounter 11/07/2019    Displaced trimalleolar fracture of left lower leg, closed, initial encounter 02/22/2025    DM type 1 (diabetes mellitus, type 1) (Formerly [facility])     Encounter regarding vascular access for dialysis for ESRD 
Unable to arrange transport home for this patient following dialysis today. Patient's wheelchair needs picked up from [facility] by transport prior to picking up the patient, but could not be arranged before [facility] closes at 4pm today. Patient would prefer to return home tomorrow so she can take her wheelchair home.   
Nephrology consult.  3.  Hypertension: Continue home medication.  And I will add amlodipine 5 mg as her blood pressure is not controlled.  4.  COPD: Patient uses oxygen at home 5 L via nasal cannula continue oxygen supplement.   She coud not go today due to transportation problem.      NOTE: This report was transcribed using voice recognition software. Every effort was made to ensure accuracy; however, inadvertent computerized transcription errors may be present.  Electronically signed by NASIM VIZCAINO MD on 7/5/2025 at 9:25 PM     
Patient uses oxygen at home 5 L via nasal cannula continue oxygen supplement.  Patient will go home tomorrow.    NOTE: This report was transcribed using voice recognition software. Every effort was made to ensure accuracy; however, inadvertent computerized transcription errors may be present.  Electronically signed by NASIM VIZCAINO MD on 7/3/2025 at 3:50 PM     
Oral Daily    gabapentin  300 mg Oral Once per day on Tuesday Thursday Saturday    metoprolol tartrate  25 mg Oral BID    albuterol  2.5 mg Nebulization Q6H        dextrose      sodium chloride      sodium chloride 75 mL/hr at 07/01/25 1716    dextrose 5% and 0.45% NaCl with KCl 20 mEq Stopped (07/02/25 1307)    sodium chloride         Meds prn:     ondansetron, glucose, dextrose bolus **OR** dextrose bolus, glucagon (rDNA), dextrose, acetaminophen, sodium chloride, diphenhydrAMINE, traMADol, magnesium sulfate, sodium phosphate 15 mmol in sodium chloride 0.9 % 250 mL IVPB, dextrose 5% and 0.45% NaCl with KCl 20 mEq, hydrALAZINE, labetalol    Meds prior to admission:     No current facility-administered medications on file prior to encounter.     Current Outpatient Medications on File Prior to Encounter   Medication Sig Dispense Refill    nicotine (NICODERM CQ) 21 MG/24HR Place 1 patch onto the skin every 24 hours 28 patch 0    lidocaine 4 % external patch Place 1 patch onto the skin daily 30 each 1    Clindamycin Phosphate (CLINDAMYCIN PHOS, TWICE-DAILY,) 1 % GEL Apply 1 application  topically daily 60 g 1    pantoprazole (PROTONIX) 40 MG tablet Take 1 tablet by mouth every morning (before breakfast) 90 tablet 1    diphenhydrAMINE (BENADRYL) 25 MG tablet Take 1 tablet by mouth every 8 hours as needed for Itching 90 tablet 3    Vitamin D, Ergocalciferol, 91476 units CAPS Take 50,000 Units by mouth once a week for 3 doses 3 capsule 0    B-Complex CAPS Take 1 capsule by mouth daily 90 capsule 1    clopidogrel (PLAVIX) 75 MG tablet Take 1 tablet by mouth daily 90 tablet 3    insulin glargine (LANTUS) 100 UNIT/ML injection vial Inject 5 Units into the skin every morning 10 mL 3    B Complex-C-Folic Acid (NEPHRO-JACOB) 0.8 MG TABS Take 1 tablet by mouth daily      magnesium hydroxide (MILK OF MAGNESIA) 400 MG/5ML suspension Take 30 mLs by mouth daily as needed for Constipation      bisacodyl (DULCOLAX) 10 MG suppository

## 2025-07-08 NOTE — DISCHARGE SUMMARY
[facility] Hospitalist Physician Discharge Summary       No follow-up provider specified.    Activity level:  As tolerated    Dispo: home      Condition on discharge:  stable    Patient ID:  Michelle Nettles  [id number]  40 y.o.  1985    Admit date: 7/1/2025    Discharge date and time:  07/06/25  10:00 AM    Admission Diagnoses: Principal Problem:    DKA, type 1, not at goal (HCC)  Resolved Problems:    * No resolved hospital problems. *      Discharge Diagnoses: Principal Problem:    DKA, type 1, not at goal (HCC)  Resolved Problems:    * No resolved hospital problems. *      Consults:  IP CONSULT TO DIABETES EDUCATOR  IP CONSULT TO INTERNAL MEDICINE  IP CONSULT TO NEPHROLOGY  IP CONSULT TO CRITICAL CARE  IP CONSULT TO DIABETES EDUCATOR  IP CONSULT TO VASCULAR ACCESS TEAM    Procedures:  none    Hospital Course:   Patient Michelle Nettles is a 40 y.o. presented with Supplemental oxygen dependent [Z99.81]  ESRD on hemodialysis (HCC) [N18.6, Z99.2]  DKA, type 1, not at goal (HCC) [E10.10]  High anion gap metabolic acidosis [E87.29]  Elevated lactic acid level [R79.89]  Diabetic ketoacidosis with coma associated with type 1 diabetes mellitus (HCC) [E10.11]  Hypervolemia, unspecified hypervolemia type [E87.70]  Nausea and vomiting, unspecified vomiting type [R11.2]  This is 40-year-old -American female with past medical history of ESRD on dialysis, diabetes mellitus, hypertension, and anemia went for dialysis today. She was drowsy at dialysis and they checked lab found to have high anion gap, high sugar sent here for evaluation and treatment. Patient was not forthcoming with her history because she was drowsy. She endorses she was using oxygen via nasal cannula 5 L at home. Her vitals in ER blood pressure 184/90, pulse 111, respiration 22 and temperature 98.9 °F. Lactic acid level 6.6, sodium 133, potassium 3.4 and bicarb 16 and BUN 31 creatinine 9.9 anion gap 29.   A/P:     1.

## 2025-07-14 DIAGNOSIS — E10.22 TYPE 1 DIABETES MELLITUS WITH CHRONIC KIDNEY DISEASE ON CHRONIC DIALYSIS (HCC): ICD-10-CM

## 2025-07-14 DIAGNOSIS — N18.6 TYPE 1 DIABETES MELLITUS WITH CHRONIC KIDNEY DISEASE ON CHRONIC DIALYSIS (HCC): ICD-10-CM

## 2025-07-14 DIAGNOSIS — Z99.2 TYPE 1 DIABETES MELLITUS WITH CHRONIC KIDNEY DISEASE ON CHRONIC DIALYSIS (HCC): ICD-10-CM

## 2025-07-15 RX ORDER — INSULIN LISPRO 100 [IU]/ML
INJECTION, SOLUTION INTRAVENOUS; SUBCUTANEOUS
Qty: 15 ML | Refills: 5 | Status: SHIPPED | OUTPATIENT
Start: 2025-07-15

## 2025-07-16 ENCOUNTER — OFFICE VISIT (OUTPATIENT)
Age: 40
End: 2025-07-16
Payer: COMMERCIAL

## 2025-07-16 ENCOUNTER — HOSPITAL ENCOUNTER (OUTPATIENT)
Dept: GENERAL RADIOLOGY | Age: 40
Discharge: HOME | End: 2025-07-18
Payer: COMMERCIAL

## 2025-07-16 VITALS
TEMPERATURE: 98.2 F | DIASTOLIC BLOOD PRESSURE: 69 MMHG | RESPIRATION RATE: 16 BRPM | HEART RATE: 96 BPM | SYSTOLIC BLOOD PRESSURE: 126 MMHG | OXYGEN SATURATION: 100 %

## 2025-07-16 DIAGNOSIS — S72.432A: ICD-10-CM

## 2025-07-16 DIAGNOSIS — S72.432A: Primary | ICD-10-CM

## 2025-07-16 DIAGNOSIS — S82.852A TRIMALLEOLAR FRACTURE OF ANKLE, CLOSED, LEFT, INITIAL ENCOUNTER: ICD-10-CM

## 2025-07-16 DIAGNOSIS — S82.852D CLOSED DISPLACED TRIMALLEOLAR FRACTURE OF LEFT ANKLE WITH ROUTINE HEALING, SUBSEQUENT ENCOUNTER: ICD-10-CM

## 2025-07-16 PROCEDURE — 99024 POSTOP FOLLOW-UP VISIT: CPT | Performed by: PHYSICIAN ASSISTANT

## 2025-07-16 PROCEDURE — 73610 X-RAY EXAM OF ANKLE: CPT

## 2025-07-16 PROCEDURE — 73560 X-RAY EXAM OF KNEE 1 OR 2: CPT

## 2025-07-16 RX ORDER — ACETAMINOPHEN 500 MG
1000 TABLET ORAL EVERY 8 HOURS PRN
Qty: 360 TABLET | Refills: 1 | Status: SHIPPED | OUTPATIENT
Start: 2025-07-16 | End: 2025-11-13

## 2025-07-16 NOTE — PROGRESS NOTES
voiced recognition software.  Every effort has been made to ensure accuracy; however, inadvertent computerized transcription errors may be present.

## 2025-07-17 ENCOUNTER — CLINICAL DOCUMENTATION (OUTPATIENT)
Age: 40
End: 2025-07-17

## 2025-07-17 NOTE — PROGRESS NOTES
Unable to reach the patient.  Left voice mail and call back number.  This completes the follow up  calls.

## 2025-07-18 ENCOUNTER — TELEPHONE (OUTPATIENT)
Age: 40
End: 2025-07-18

## 2025-07-24 ENCOUNTER — PATIENT MESSAGE (OUTPATIENT)
Dept: ENDOCRINOLOGY | Age: 40
End: 2025-07-24

## 2025-07-24 DIAGNOSIS — E10.22 TYPE 1 DIABETES MELLITUS WITH CHRONIC KIDNEY DISEASE ON CHRONIC DIALYSIS (HCC): Primary | ICD-10-CM

## 2025-07-24 DIAGNOSIS — Z99.2 TYPE 1 DIABETES MELLITUS WITH CHRONIC KIDNEY DISEASE ON CHRONIC DIALYSIS (HCC): Primary | ICD-10-CM

## 2025-07-24 DIAGNOSIS — N18.6 TYPE 1 DIABETES MELLITUS WITH CHRONIC KIDNEY DISEASE ON CHRONIC DIALYSIS (HCC): Primary | ICD-10-CM

## 2025-07-25 RX ORDER — BLOOD SUGAR DIAGNOSTIC
1 STRIP MISCELLANEOUS DAILY
Qty: 100 EACH | Refills: 3 | Status: SHIPPED | OUTPATIENT
Start: 2025-07-25

## 2025-07-25 RX ORDER — BLOOD SUGAR DIAGNOSTIC
STRIP MISCELLANEOUS
Qty: 200 STRIP | Refills: 5 | Status: SHIPPED | OUTPATIENT
Start: 2025-07-25

## 2025-07-25 NOTE — TELEPHONE ENCOUNTER
I SENT TEST STRIPS INTO THE PROVIDER TO SIGN OFF ON THE SCRIPT AND THEN WILL GO TO THE PHARMACY. THANK YOU.

## 2025-07-29 ENCOUNTER — HOSPITAL ENCOUNTER (INPATIENT)
Age: 40
LOS: 7 days | Discharge: HOME | DRG: 637 | End: 2025-08-05
Attending: EMERGENCY MEDICINE | Admitting: INTERNAL MEDICINE
Payer: MEDICARE

## 2025-07-29 ENCOUNTER — APPOINTMENT (OUTPATIENT)
Dept: CT IMAGING | Age: 40
DRG: 637 | End: 2025-07-29
Payer: MEDICARE

## 2025-07-29 ENCOUNTER — APPOINTMENT (OUTPATIENT)
Dept: GENERAL RADIOLOGY | Age: 40
DRG: 637 | End: 2025-07-29
Payer: MEDICARE

## 2025-07-29 DIAGNOSIS — E10.10 DKA, TYPE 1, NOT AT GOAL (HCC): ICD-10-CM

## 2025-07-29 DIAGNOSIS — F41.9 ANXIETY: ICD-10-CM

## 2025-07-29 DIAGNOSIS — E13.10 DIABETIC KETOACIDOSIS WITHOUT COMA ASSOCIATED WITH OTHER SPECIFIED DIABETES MELLITUS (HCC): Primary | ICD-10-CM

## 2025-07-29 DIAGNOSIS — K85.90 ACUTE PANCREATITIS, UNSPECIFIED COMPLICATION STATUS, UNSPECIFIED PANCREATITIS TYPE: ICD-10-CM

## 2025-07-29 PROBLEM — E11.10 DIABETIC ACIDOSIS WITHOUT COMA (HCC): Status: ACTIVE | Noted: 2025-06-17

## 2025-07-29 LAB
ALBUMIN SERPL-MCNC: 3.5 G/DL (ref 3.5–5.2)
ALP SERPL-CCNC: 213 U/L (ref 35–104)
ALT SERPL-CCNC: 10 U/L (ref 0–35)
ANION GAP SERPL CALCULATED.3IONS-SCNC: 32 MMOL/L (ref 7–16)
ANION GAP SERPL CALCULATED.3IONS-SCNC: 39 MMOL/L (ref 7–16)
ANION GAP SERPL CALCULATED.3IONS-SCNC: 45 MMOL/L (ref 7–16)
AST SERPL-CCNC: 18 U/L (ref 0–35)
B-OH-BUTYR SERPL-MCNC: >4.5 MMOL/L (ref 0.02–0.27)
B-OH-BUTYR SERPL-MCNC: >4.5 MMOL/L (ref 0.02–0.27)
BASOPHILS # BLD: 0.03 K/UL (ref 0–0.2)
BASOPHILS NFR BLD: 0 % (ref 0–2)
BILIRUB DIRECT SERPL-MCNC: 0.1 MG/DL (ref 0–0.2)
BILIRUB INDIRECT SERPL-MCNC: 0.1 MG/DL (ref 0–1)
BILIRUB SERPL-MCNC: 0.2 MG/DL (ref 0–1.2)
BUN BLD-MCNC: 52 MG/DL (ref 6–20)
BUN SERPL-MCNC: 53 MG/DL (ref 6–20)
BUN SERPL-MCNC: 55 MG/DL (ref 6–20)
BUN SERPL-MCNC: 59 MG/DL (ref 6–20)
CALCIUM SERPL-MCNC: 7.7 MG/DL (ref 8.6–10)
CALCIUM SERPL-MCNC: 8.1 MG/DL (ref 8.6–10)
CALCIUM SERPL-MCNC: 8.9 MG/DL (ref 8.6–10)
CHLORIDE BLD-SCNC: 96 MMOL/L (ref 100–108)
CHLORIDE SERPL-SCNC: 75 MMOL/L (ref 98–107)
CHLORIDE SERPL-SCNC: 85 MMOL/L (ref 98–107)
CHLORIDE SERPL-SCNC: 88 MMOL/L (ref 98–107)
CO2 BLD CALC-SCNC: <5 MMOL/L (ref 22–29)
CO2 SERPL-SCNC: 14 MMOL/L (ref 22–29)
CO2 SERPL-SCNC: 5 MMOL/L (ref 22–29)
CO2 SERPL-SCNC: 5 MMOL/L (ref 22–29)
COHB: 0.3 % (ref 0–1.5)
CREAT BLD-MCNC: 9.5 MG/DL (ref 0.5–1)
CREAT SERPL-MCNC: 8.4 MG/DL (ref 0.5–1)
CREAT SERPL-MCNC: 8.8 MG/DL (ref 0.5–1)
CREAT SERPL-MCNC: 9.2 MG/DL (ref 0.5–1)
CRITICAL: ABNORMAL
DATE ANALYZED: ABNORMAL
DATE OF COLLECTION: ABNORMAL
EGFR, POC: 5 ML/MIN/1.73M2
EOSINOPHIL # BLD: 0.01 K/UL (ref 0.05–0.5)
EOSINOPHILS RELATIVE PERCENT: 0 % (ref 0–6)
ERYTHROCYTE [DISTWIDTH] IN BLOOD BY AUTOMATED COUNT: 19.7 % (ref 11.5–15)
GFR, ESTIMATED: 5 ML/MIN/1.73M2
GFR, ESTIMATED: 5 ML/MIN/1.73M2
GFR, ESTIMATED: 6 ML/MIN/1.73M2
GLUCOSE BLD-MCNC: 342 MG/DL (ref 74–99)
GLUCOSE BLD-MCNC: 376 MG/DL (ref 74–99)
GLUCOSE BLD-MCNC: >500 MG/DL (ref 74–99)
GLUCOSE BLD-MCNC: >700 MG/DL (ref 74–99)
GLUCOSE SERPL-MCNC: 1041 MG/DL (ref 74–99)
GLUCOSE SERPL-MCNC: 1164 MG/DL (ref 74–99)
GLUCOSE SERPL-MCNC: 553 MG/DL (ref 74–99)
GLUCOSE SERPL-MCNC: 743 MG/DL (ref 74–99)
GLUCOSE SERPL-MCNC: 888 MG/DL (ref 74–99)
GLUCOSE SERPL-MCNC: 894 MG/DL (ref 74–99)
HBA1C MFR BLD: 10.2 % (ref 4–5.6)
HCT VFR BLD AUTO: 27.4 % (ref 34–48)
HGB BLD-MCNC: 8.4 G/DL (ref 11.5–15.5)
HHB: 1.3 % (ref 0–5)
IMM GRANULOCYTES # BLD AUTO: 0.07 K/UL (ref 0–0.58)
IMM GRANULOCYTES NFR BLD: 1 % (ref 0–5)
LAB: ABNORMAL
LACTATE BLDV-SCNC: 4.9 MMOL/L (ref 0.5–1.9)
LACTATE BLDV-SCNC: 4.9 MMOL/L (ref 0.5–2.2)
LACTATE BLDV-SCNC: 5.4 MMOL/L (ref 0.5–1.9)
LACTATE BLDV-SCNC: 6.3 MMOL/L (ref 0.5–2.2)
LIPASE SERPL-CCNC: 558 U/L (ref 13–60)
LYMPHOCYTES NFR BLD: 0.6 K/UL (ref 1.5–4)
LYMPHOCYTES RELATIVE PERCENT: 6 % (ref 20–42)
Lab: 1215
MAGNESIUM SERPL-MCNC: 2 MG/DL (ref 1.6–2.6)
MAGNESIUM SERPL-MCNC: 2.2 MG/DL (ref 1.6–2.6)
MCH RBC QN AUTO: 30.3 PG (ref 26–35)
MCHC RBC AUTO-ENTMCNC: 30.7 G/DL (ref 32–34.5)
MCV RBC AUTO: 98.9 FL (ref 80–99.9)
METHB: 0.6 % (ref 0–1.5)
MODE: ABNORMAL
MONOCYTES NFR BLD: 0.63 K/UL (ref 0.1–0.95)
MONOCYTES NFR BLD: 6 % (ref 2–12)
NEUTROPHILS NFR BLD: 87 % (ref 43–80)
NEUTS SEG NFR BLD: 8.94 K/UL (ref 1.8–7.3)
O2 SATURATION: 98.7 % (ref 92–98.5)
O2HB: 97.8 % (ref 94–97)
OPERATOR ID: 405
PATIENT TEMP: 37 C
PCO2: <15 MMHG (ref 35–45)
PH BLOOD GAS: 7.08 (ref 7.35–7.45)
PHOSPHATE SERPL-MCNC: 2.4 MG/DL (ref 2.5–4.5)
PHOSPHATE SERPL-MCNC: 4.2 MG/DL (ref 2.5–4.5)
PLATELET # BLD AUTO: 366 K/UL (ref 130–450)
PMV BLD AUTO: 10 FL (ref 7–12)
PO2: 155 MMHG (ref 75–100)
POC ANION GAP: ABNORMAL MMOL/L (ref 7–16)
POTASSIUM BLD-SCNC: 4.3 MMOL/L (ref 3.5–5)
POTASSIUM SERPL-SCNC: 3.4 MMOL/L (ref 3.5–5.1)
POTASSIUM SERPL-SCNC: 3.6 MMOL/L (ref 3.5–5.1)
POTASSIUM SERPL-SCNC: 4.6 MMOL/L (ref 3.5–5.1)
PROT SERPL-MCNC: 7.6 G/DL (ref 6.4–8.3)
RBC # BLD AUTO: 2.77 M/UL (ref 3.5–5.5)
SODIUM BLD-SCNC: 118 MMOL/L (ref 132–146)
SODIUM SERPL-SCNC: 125 MMOL/L (ref 136–145)
SODIUM SERPL-SCNC: 129 MMOL/L (ref 136–145)
SODIUM SERPL-SCNC: 134 MMOL/L (ref 136–145)
SOURCE, BLOOD GAS: ABNORMAL
THB: 8.9 G/DL (ref 11.5–15.5)
TIME ANALYZED: 1218
WBC OTHER # BLD: 10.3 K/UL (ref 4.5–11.5)

## 2025-07-29 PROCEDURE — 82248 BILIRUBIN DIRECT: CPT

## 2025-07-29 PROCEDURE — 96361 HYDRATE IV INFUSION ADD-ON: CPT

## 2025-07-29 PROCEDURE — 84520 ASSAY OF UREA NITROGEN: CPT

## 2025-07-29 PROCEDURE — 74176 CT ABD & PELVIS W/O CONTRAST: CPT

## 2025-07-29 PROCEDURE — 82947 ASSAY GLUCOSE BLOOD QUANT: CPT

## 2025-07-29 PROCEDURE — 80048 BASIC METABOLIC PNL TOTAL CA: CPT

## 2025-07-29 PROCEDURE — 6370000000 HC RX 637 (ALT 250 FOR IP)

## 2025-07-29 PROCEDURE — 83036 HEMOGLOBIN GLYCOSYLATED A1C: CPT

## 2025-07-29 PROCEDURE — 84100 ASSAY OF PHOSPHORUS: CPT

## 2025-07-29 PROCEDURE — 36592 COLLECT BLOOD FROM PICC: CPT

## 2025-07-29 PROCEDURE — 71045 X-RAY EXAM CHEST 1 VIEW: CPT

## 2025-07-29 PROCEDURE — 93005 ELECTROCARDIOGRAM TRACING: CPT | Performed by: EMERGENCY MEDICINE

## 2025-07-29 PROCEDURE — 06HY33Z INSERTION OF INFUSION DEVICE INTO LOWER VEIN, PERCUTANEOUS APPROACH: ICD-10-PCS

## 2025-07-29 PROCEDURE — 36556 INSERT NON-TUNNEL CV CATH: CPT

## 2025-07-29 PROCEDURE — 96365 THER/PROPH/DIAG IV INF INIT: CPT

## 2025-07-29 PROCEDURE — 85025 COMPLETE CBC W/AUTO DIFF WBC: CPT

## 2025-07-29 PROCEDURE — 82565 ASSAY OF CREATININE: CPT

## 2025-07-29 PROCEDURE — 82805 BLOOD GASES W/O2 SATURATION: CPT

## 2025-07-29 PROCEDURE — 83605 ASSAY OF LACTIC ACID: CPT

## 2025-07-29 PROCEDURE — 2000000000 HC ICU R&B

## 2025-07-29 PROCEDURE — 2500000003 HC RX 250 WO HCPCS: Performed by: INTERNAL MEDICINE

## 2025-07-29 PROCEDURE — 2580000003 HC RX 258

## 2025-07-29 PROCEDURE — 99285 EMERGENCY DEPT VISIT HI MDM: CPT

## 2025-07-29 PROCEDURE — 6360000002 HC RX W HCPCS

## 2025-07-29 PROCEDURE — 82010 KETONE BODYS QUAN: CPT

## 2025-07-29 PROCEDURE — 99223 1ST HOSP IP/OBS HIGH 75: CPT | Performed by: INTERNAL MEDICINE

## 2025-07-29 PROCEDURE — 05JY3ZZ INSPECTION OF UPPER VEIN, PERCUTANEOUS APPROACH: ICD-10-PCS | Performed by: STUDENT IN AN ORGANIZED HEALTH CARE EDUCATION/TRAINING PROGRAM

## 2025-07-29 PROCEDURE — 2500000003 HC RX 250 WO HCPCS

## 2025-07-29 PROCEDURE — 96375 TX/PRO/DX INJ NEW DRUG ADDON: CPT

## 2025-07-29 PROCEDURE — 80053 COMPREHEN METABOLIC PANEL: CPT

## 2025-07-29 PROCEDURE — 83690 ASSAY OF LIPASE: CPT

## 2025-07-29 PROCEDURE — 2580000003 HC RX 258: Performed by: INTERNAL MEDICINE

## 2025-07-29 PROCEDURE — 87040 BLOOD CULTURE FOR BACTERIA: CPT

## 2025-07-29 PROCEDURE — 82962 GLUCOSE BLOOD TEST: CPT

## 2025-07-29 PROCEDURE — 80051 ELECTROLYTE PANEL: CPT

## 2025-07-29 PROCEDURE — 83735 ASSAY OF MAGNESIUM: CPT

## 2025-07-29 RX ORDER — MAGNESIUM SULFATE IN WATER 40 MG/ML
2000 INJECTION, SOLUTION INTRAVENOUS PRN
Status: DISCONTINUED | OUTPATIENT
Start: 2025-07-29 | End: 2025-07-31

## 2025-07-29 RX ORDER — 0.9 % SODIUM CHLORIDE 0.9 %
500 INTRAVENOUS SOLUTION INTRAVENOUS ONCE
Status: DISCONTINUED | OUTPATIENT
Start: 2025-07-29 | End: 2025-07-29

## 2025-07-29 RX ORDER — DEXTROSE MONOHYDRATE AND SODIUM CHLORIDE 5; .45 G/100ML; G/100ML
INJECTION, SOLUTION INTRAVENOUS CONTINUOUS PRN
Status: DISCONTINUED | OUTPATIENT
Start: 2025-07-29 | End: 2025-08-05 | Stop reason: HOSPADM

## 2025-07-29 RX ORDER — SODIUM CHLORIDE 9 MG/ML
INJECTION, SOLUTION INTRAVENOUS CONTINUOUS
Status: DISCONTINUED | OUTPATIENT
Start: 2025-07-29 | End: 2025-07-29

## 2025-07-29 RX ORDER — INDOMETHACIN 25 MG/1
CAPSULE ORAL
Status: COMPLETED
Start: 2025-07-29 | End: 2025-07-29

## 2025-07-29 RX ORDER — 0.9 % SODIUM CHLORIDE 0.9 %
15 INTRAVENOUS SOLUTION INTRAVENOUS ONCE
Status: COMPLETED | OUTPATIENT
Start: 2025-07-29 | End: 2025-07-29

## 2025-07-29 RX ORDER — POTASSIUM CHLORIDE 7.45 MG/ML
10 INJECTION INTRAVENOUS PRN
Status: DISCONTINUED | OUTPATIENT
Start: 2025-07-29 | End: 2025-07-31

## 2025-07-29 RX ORDER — INDOMETHACIN 25 MG/1
100 CAPSULE ORAL ONCE
Status: COMPLETED | OUTPATIENT
Start: 2025-07-29 | End: 2025-07-29

## 2025-07-29 RX ORDER — 0.9 % SODIUM CHLORIDE 0.9 %
1000 INTRAVENOUS SOLUTION INTRAVENOUS ONCE
Status: COMPLETED | OUTPATIENT
Start: 2025-07-29 | End: 2025-07-29

## 2025-07-29 RX ADMIN — SODIUM CHLORIDE 17.6 UNITS/HR: 9 INJECTION, SOLUTION INTRAVENOUS at 19:21

## 2025-07-29 RX ADMIN — POTASSIUM CHLORIDE 10 MEQ: 7.46 INJECTION, SOLUTION INTRAVENOUS at 17:00

## 2025-07-29 RX ADMIN — SODIUM CHLORIDE 16.2 UNITS/HR: 9 INJECTION, SOLUTION INTRAVENOUS at 15:47

## 2025-07-29 RX ADMIN — POTASSIUM CHLORIDE 10 MEQ: 7.46 INJECTION, SOLUTION INTRAVENOUS at 18:49

## 2025-07-29 RX ADMIN — POTASSIUM CHLORIDE 10 MEQ: 7.46 INJECTION, SOLUTION INTRAVENOUS at 13:13

## 2025-07-29 RX ADMIN — POTASSIUM CHLORIDE 10 MEQ: 7.46 INJECTION, SOLUTION INTRAVENOUS at 14:25

## 2025-07-29 RX ADMIN — INDOMETHACIN 100 MEQ: 25 CAPSULE ORAL at 19:06

## 2025-07-29 RX ADMIN — SODIUM CHLORIDE 1000 ML: 0.9 INJECTION, SOLUTION INTRAVENOUS at 11:45

## 2025-07-29 RX ADMIN — SODIUM CHLORIDE 10.8 UNITS/HR: 9 INJECTION, SOLUTION INTRAVENOUS at 13:20

## 2025-07-29 RX ADMIN — POTASSIUM CHLORIDE 10 MEQ: 7.46 INJECTION, SOLUTION INTRAVENOUS at 23:25

## 2025-07-29 RX ADMIN — POTASSIUM CHLORIDE 10 MEQ: 7.46 INJECTION, SOLUTION INTRAVENOUS at 19:39

## 2025-07-29 RX ADMIN — SODIUM BICARBONATE: 84 INJECTION, SOLUTION INTRAVENOUS at 19:26

## 2025-07-29 RX ADMIN — SODIUM CHLORIDE: 0.9 INJECTION, SOLUTION INTRAVENOUS at 13:13

## 2025-07-29 RX ADMIN — SODIUM BICARBONATE 100 MEQ: 84 INJECTION, SOLUTION INTRAVENOUS at 19:06

## 2025-07-29 RX ADMIN — SODIUM CHLORIDE 960 ML: 0.9 INJECTION, SOLUTION INTRAVENOUS at 13:12

## 2025-07-29 ASSESSMENT — LIFESTYLE VARIABLES: HOW OFTEN DO YOU HAVE A DRINK CONTAINING ALCOHOL: NEVER

## 2025-07-30 LAB
ANION GAP SERPL CALCULATED.3IONS-SCNC: 12 MMOL/L (ref 7–16)
ANION GAP SERPL CALCULATED.3IONS-SCNC: 16 MMOL/L (ref 7–16)
ANION GAP SERPL CALCULATED.3IONS-SCNC: 17 MMOL/L (ref 7–16)
ANION GAP SERPL CALCULATED.3IONS-SCNC: 17 MMOL/L (ref 7–16)
B PARAP IS1001 DNA NPH QL NAA+NON-PROBE: NOT DETECTED
B PERT DNA SPEC QL NAA+PROBE: NOT DETECTED
B-OH-BUTYR SERPL-MCNC: 0.44 MMOL/L (ref 0.02–0.27)
BUN SERPL-MCNC: 15 MG/DL (ref 6–20)
BUN SERPL-MCNC: 53 MG/DL (ref 6–20)
C PNEUM DNA NPH QL NAA+NON-PROBE: NOT DETECTED
CALCIUM SERPL-MCNC: 7.3 MG/DL (ref 8.6–10)
CALCIUM SERPL-MCNC: 7.3 MG/DL (ref 8.6–10)
CALCIUM SERPL-MCNC: 7.6 MG/DL (ref 8.6–10)
CALCIUM SERPL-MCNC: 8 MG/DL (ref 8.6–10)
CHLORIDE SERPL-SCNC: 95 MMOL/L (ref 98–107)
CHLORIDE SERPL-SCNC: 96 MMOL/L (ref 98–107)
CHLORIDE SERPL-SCNC: 96 MMOL/L (ref 98–107)
CHLORIDE SERPL-SCNC: 98 MMOL/L (ref 98–107)
CO2 SERPL-SCNC: 23 MMOL/L (ref 22–29)
CO2 SERPL-SCNC: 24 MMOL/L (ref 22–29)
CO2 SERPL-SCNC: 25 MMOL/L (ref 22–29)
CO2 SERPL-SCNC: 25 MMOL/L (ref 22–29)
CREAT SERPL-MCNC: 3.3 MG/DL (ref 0.5–1)
CREAT SERPL-MCNC: 8.6 MG/DL (ref 0.5–1)
CREAT SERPL-MCNC: 8.7 MG/DL (ref 0.5–1)
CREAT SERPL-MCNC: 8.8 MG/DL (ref 0.5–1)
FLUAV RNA NPH QL NAA+NON-PROBE: NOT DETECTED
FLUBV RNA NPH QL NAA+NON-PROBE: NOT DETECTED
GFR, ESTIMATED: 17 ML/MIN/1.73M2
GFR, ESTIMATED: 5 ML/MIN/1.73M2
GFR, ESTIMATED: 5 ML/MIN/1.73M2
GFR, ESTIMATED: 6 ML/MIN/1.73M2
GLUCOSE BLD-MCNC: 100 MG/DL (ref 74–99)
GLUCOSE BLD-MCNC: 107 MG/DL (ref 74–99)
GLUCOSE BLD-MCNC: 108 MG/DL (ref 74–99)
GLUCOSE BLD-MCNC: 110 MG/DL (ref 74–99)
GLUCOSE BLD-MCNC: 135 MG/DL (ref 74–99)
GLUCOSE BLD-MCNC: 150 MG/DL (ref 74–99)
GLUCOSE BLD-MCNC: 185 MG/DL (ref 74–99)
GLUCOSE BLD-MCNC: 186 MG/DL (ref 74–99)
GLUCOSE BLD-MCNC: 194 MG/DL (ref 74–99)
GLUCOSE BLD-MCNC: 214 MG/DL (ref 74–99)
GLUCOSE BLD-MCNC: 252 MG/DL (ref 74–99)
GLUCOSE BLD-MCNC: 260 MG/DL (ref 74–99)
GLUCOSE BLD-MCNC: 295 MG/DL (ref 74–99)
GLUCOSE BLD-MCNC: 64 MG/DL (ref 74–99)
GLUCOSE BLD-MCNC: 85 MG/DL (ref 74–99)
GLUCOSE BLD-MCNC: 86 MG/DL (ref 74–99)
GLUCOSE BLD-MCNC: 89 MG/DL (ref 74–99)
GLUCOSE SERPL-MCNC: 127 MG/DL (ref 74–99)
GLUCOSE SERPL-MCNC: 189 MG/DL (ref 74–99)
GLUCOSE SERPL-MCNC: 243 MG/DL (ref 74–99)
GLUCOSE SERPL-MCNC: 77 MG/DL (ref 74–99)
HADV DNA NPH QL NAA+NON-PROBE: NOT DETECTED
HCOV 229E RNA NPH QL NAA+NON-PROBE: NOT DETECTED
HCOV HKU1 RNA NPH QL NAA+NON-PROBE: NOT DETECTED
HCOV NL63 RNA NPH QL NAA+NON-PROBE: NOT DETECTED
HCOV OC43 RNA NPH QL NAA+NON-PROBE: NOT DETECTED
HMPV RNA NPH QL NAA+NON-PROBE: NOT DETECTED
HPIV1 RNA NPH QL NAA+NON-PROBE: NOT DETECTED
HPIV2 RNA NPH QL NAA+NON-PROBE: NOT DETECTED
HPIV3 RNA NPH QL NAA+NON-PROBE: NOT DETECTED
HPIV4 RNA NPH QL NAA+NON-PROBE: NOT DETECTED
LACTATE BLDV-SCNC: 1.3 MMOL/L (ref 0.5–2.2)
LACTATE BLDV-SCNC: 1.4 MMOL/L (ref 0.5–2.2)
LACTATE BLDV-SCNC: 2.2 MMOL/L (ref 0.5–2.2)
LACTATE BLDV-SCNC: 2.9 MMOL/L (ref 0.5–2.2)
LACTATE BLDV-SCNC: 3.3 MMOL/L (ref 0.5–2.2)
M PNEUMO DNA NPH QL NAA+NON-PROBE: NOT DETECTED
MAGNESIUM SERPL-MCNC: 1.8 MG/DL (ref 1.6–2.6)
MAGNESIUM SERPL-MCNC: 1.9 MG/DL (ref 1.6–2.6)
MAGNESIUM SERPL-MCNC: 1.9 MG/DL (ref 1.6–2.6)
PHOSPHATE SERPL-MCNC: 2 MG/DL (ref 2.5–4.5)
PHOSPHATE SERPL-MCNC: 2.7 MG/DL (ref 2.5–4.5)
PHOSPHATE SERPL-MCNC: 3.3 MG/DL (ref 2.5–4.5)
PHOSPHATE SERPL-MCNC: 3.7 MG/DL (ref 2.5–4.5)
POTASSIUM SERPL-SCNC: 3.2 MMOL/L (ref 3.5–5.1)
POTASSIUM SERPL-SCNC: 3.3 MMOL/L (ref 3.5–5.1)
POTASSIUM SERPL-SCNC: 3.7 MMOL/L (ref 3.5–5.1)
POTASSIUM SERPL-SCNC: 3.8 MMOL/L (ref 3.5–5.1)
RSV RNA NPH QL NAA+NON-PROBE: NOT DETECTED
RV+EV RNA NPH QL NAA+NON-PROBE: NOT DETECTED
SARS-COV-2 RNA NPH QL NAA+NON-PROBE: NOT DETECTED
SODIUM SERPL-SCNC: 134 MMOL/L (ref 136–145)
SODIUM SERPL-SCNC: 135 MMOL/L (ref 136–145)
SODIUM SERPL-SCNC: 136 MMOL/L (ref 136–145)
SODIUM SERPL-SCNC: 138 MMOL/L (ref 136–145)
SPECIMEN DESCRIPTION: NORMAL
TROPONIN I SERPL HS-MCNC: 254 NG/L (ref 0–14)

## 2025-07-30 PROCEDURE — 6370000000 HC RX 637 (ALT 250 FOR IP): Performed by: STUDENT IN AN ORGANIZED HEALTH CARE EDUCATION/TRAINING PROGRAM

## 2025-07-30 PROCEDURE — 90935 HEMODIALYSIS ONE EVALUATION: CPT

## 2025-07-30 PROCEDURE — 36592 COLLECT BLOOD FROM PICC: CPT

## 2025-07-30 PROCEDURE — 80048 BASIC METABOLIC PNL TOTAL CA: CPT

## 2025-07-30 PROCEDURE — 2580000003 HC RX 258: Performed by: INTERNAL MEDICINE

## 2025-07-30 PROCEDURE — 2500000003 HC RX 250 WO HCPCS

## 2025-07-30 PROCEDURE — 2580000003 HC RX 258

## 2025-07-30 PROCEDURE — 84484 ASSAY OF TROPONIN QUANT: CPT

## 2025-07-30 PROCEDURE — 6370000000 HC RX 637 (ALT 250 FOR IP)

## 2025-07-30 PROCEDURE — 0202U NFCT DS 22 TRGT SARS-COV-2: CPT

## 2025-07-30 PROCEDURE — 2000000000 HC ICU R&B

## 2025-07-30 PROCEDURE — 82010 KETONE BODYS QUAN: CPT

## 2025-07-30 PROCEDURE — 2700000000 HC OXYGEN THERAPY PER DAY

## 2025-07-30 PROCEDURE — 2500000003 HC RX 250 WO HCPCS: Performed by: INTERNAL MEDICINE

## 2025-07-30 PROCEDURE — 82962 GLUCOSE BLOOD TEST: CPT

## 2025-07-30 PROCEDURE — 83605 ASSAY OF LACTIC ACID: CPT

## 2025-07-30 PROCEDURE — 6360000002 HC RX W HCPCS

## 2025-07-30 PROCEDURE — 99232 SBSQ HOSP IP/OBS MODERATE 35: CPT | Performed by: STUDENT IN AN ORGANIZED HEALTH CARE EDUCATION/TRAINING PROGRAM

## 2025-07-30 PROCEDURE — 83735 ASSAY OF MAGNESIUM: CPT

## 2025-07-30 PROCEDURE — 84100 ASSAY OF PHOSPHORUS: CPT

## 2025-07-30 PROCEDURE — 6360000002 HC RX W HCPCS: Performed by: INTERNAL MEDICINE

## 2025-07-30 RX ORDER — HEPARIN SODIUM 5000 [USP'U]/ML
5000 INJECTION, SOLUTION INTRAVENOUS; SUBCUTANEOUS EVERY 8 HOURS
Status: DISCONTINUED | OUTPATIENT
Start: 2025-07-30 | End: 2025-08-05 | Stop reason: HOSPADM

## 2025-07-30 RX ORDER — SODIUM CHLORIDE 0.9 % (FLUSH) 0.9 %
5-40 SYRINGE (ML) INJECTION EVERY 12 HOURS SCHEDULED
Status: DISCONTINUED | OUTPATIENT
Start: 2025-07-30 | End: 2025-08-05 | Stop reason: HOSPADM

## 2025-07-30 RX ORDER — POLYETHYLENE GLYCOL 3350 17 G/17G
17 POWDER, FOR SOLUTION ORAL DAILY PRN
Status: DISCONTINUED | OUTPATIENT
Start: 2025-07-30 | End: 2025-08-05 | Stop reason: HOSPADM

## 2025-07-30 RX ORDER — SODIUM CHLORIDE 0.9 % (FLUSH) 0.9 %
5-40 SYRINGE (ML) INJECTION PRN
Status: DISCONTINUED | OUTPATIENT
Start: 2025-07-30 | End: 2025-08-05 | Stop reason: HOSPADM

## 2025-07-30 RX ORDER — LANOLIN ALCOHOL/MO/W.PET/CERES
500 CREAM (GRAM) TOPICAL DAILY
Status: DISCONTINUED | OUTPATIENT
Start: 2025-07-30 | End: 2025-08-05 | Stop reason: HOSPADM

## 2025-07-30 RX ORDER — ACETAMINOPHEN 650 MG/1
650 SUPPOSITORY RECTAL EVERY 6 HOURS PRN
Status: DISCONTINUED | OUTPATIENT
Start: 2025-07-30 | End: 2025-08-05 | Stop reason: HOSPADM

## 2025-07-30 RX ORDER — CLOPIDOGREL BISULFATE 75 MG/1
75 TABLET ORAL DAILY
Status: DISCONTINUED | OUTPATIENT
Start: 2025-07-30 | End: 2025-08-05 | Stop reason: HOSPADM

## 2025-07-30 RX ORDER — DIPHENHYDRAMINE HCL 25 MG
25 TABLET ORAL EVERY 8 HOURS PRN
Status: DISCONTINUED | OUTPATIENT
Start: 2025-07-30 | End: 2025-08-05 | Stop reason: HOSPADM

## 2025-07-30 RX ORDER — DOCUSATE SODIUM 100 MG/1
100 CAPSULE, LIQUID FILLED ORAL DAILY
Status: DISCONTINUED | OUTPATIENT
Start: 2025-07-30 | End: 2025-08-05 | Stop reason: HOSPADM

## 2025-07-30 RX ORDER — FLUTICASONE PROPIONATE 50 MCG
2 SPRAY, SUSPENSION (ML) NASAL DAILY PRN
Status: DISCONTINUED | OUTPATIENT
Start: 2025-07-30 | End: 2025-08-05 | Stop reason: HOSPADM

## 2025-07-30 RX ORDER — NICOTINE 21 MG/24HR
1 PATCH, TRANSDERMAL 24 HOURS TRANSDERMAL EVERY 24 HOURS
Status: DISCONTINUED | OUTPATIENT
Start: 2025-07-30 | End: 2025-08-05 | Stop reason: HOSPADM

## 2025-07-30 RX ORDER — ALBUTEROL SULFATE 0.83 MG/ML
2.5 SOLUTION RESPIRATORY (INHALATION)
Status: DISCONTINUED | OUTPATIENT
Start: 2025-07-30 | End: 2025-07-30

## 2025-07-30 RX ORDER — FERROUS SULFATE 325(65) MG
325 TABLET ORAL EVERY OTHER DAY
Status: DISCONTINUED | OUTPATIENT
Start: 2025-07-30 | End: 2025-08-05 | Stop reason: HOSPADM

## 2025-07-30 RX ORDER — ONDANSETRON 2 MG/ML
4 INJECTION INTRAMUSCULAR; INTRAVENOUS EVERY 6 HOURS PRN
Status: DISCONTINUED | OUTPATIENT
Start: 2025-07-30 | End: 2025-08-05 | Stop reason: HOSPADM

## 2025-07-30 RX ORDER — AMMONIUM LACTATE 12 G/100G
LOTION TOPICAL PRN
Status: DISCONTINUED | OUTPATIENT
Start: 2025-07-30 | End: 2025-08-05 | Stop reason: HOSPADM

## 2025-07-30 RX ORDER — GLUCAGON 1 MG/ML
1 KIT INJECTION PRN
Status: DISCONTINUED | OUTPATIENT
Start: 2025-07-30 | End: 2025-08-05 | Stop reason: HOSPADM

## 2025-07-30 RX ORDER — ONDANSETRON 4 MG/1
4 TABLET, ORALLY DISINTEGRATING ORAL EVERY 8 HOURS PRN
Status: DISCONTINUED | OUTPATIENT
Start: 2025-07-30 | End: 2025-08-05 | Stop reason: HOSPADM

## 2025-07-30 RX ORDER — CETIRIZINE HYDROCHLORIDE 10 MG/1
5 TABLET ORAL DAILY
Status: DISCONTINUED | OUTPATIENT
Start: 2025-07-30 | End: 2025-08-05 | Stop reason: HOSPADM

## 2025-07-30 RX ORDER — ACETAMINOPHEN 325 MG/1
650 TABLET ORAL EVERY 6 HOURS PRN
Status: DISCONTINUED | OUTPATIENT
Start: 2025-07-30 | End: 2025-08-05 | Stop reason: HOSPADM

## 2025-07-30 RX ORDER — INSULIN LISPRO 100 [IU]/ML
0-4 INJECTION, SOLUTION INTRAVENOUS; SUBCUTANEOUS
Status: DISCONTINUED | OUTPATIENT
Start: 2025-07-30 | End: 2025-08-05 | Stop reason: HOSPADM

## 2025-07-30 RX ORDER — SODIUM CHLORIDE 9 MG/ML
INJECTION, SOLUTION INTRAVENOUS PRN
Status: DISCONTINUED | OUTPATIENT
Start: 2025-07-30 | End: 2025-08-05 | Stop reason: HOSPADM

## 2025-07-30 RX ORDER — ALBUTEROL SULFATE 0.83 MG/ML
2.5 SOLUTION RESPIRATORY (INHALATION) EVERY 4 HOURS PRN
Status: DISCONTINUED | OUTPATIENT
Start: 2025-07-30 | End: 2025-08-05 | Stop reason: HOSPADM

## 2025-07-30 RX ORDER — PANTOPRAZOLE SODIUM 40 MG/1
40 TABLET, DELAYED RELEASE ORAL
Status: DISCONTINUED | OUTPATIENT
Start: 2025-07-31 | End: 2025-08-05 | Stop reason: HOSPADM

## 2025-07-30 RX ORDER — DEXTROSE MONOHYDRATE 100 MG/ML
INJECTION, SOLUTION INTRAVENOUS CONTINUOUS PRN
Status: DISCONTINUED | OUTPATIENT
Start: 2025-07-30 | End: 2025-08-05 | Stop reason: HOSPADM

## 2025-07-30 RX ORDER — INSULIN GLARGINE 100 [IU]/ML
15 INJECTION, SOLUTION SUBCUTANEOUS NIGHTLY
Status: DISCONTINUED | OUTPATIENT
Start: 2025-07-30 | End: 2025-07-31

## 2025-07-30 RX ADMIN — DEXTROSE AND SODIUM CHLORIDE: 5; .45 INJECTION, SOLUTION INTRAVENOUS at 02:36

## 2025-07-30 RX ADMIN — ONDANSETRON HYDROCHLORIDE 4 MG: 2 SOLUTION INTRAMUSCULAR; INTRAVENOUS at 19:00

## 2025-07-30 RX ADMIN — POTASSIUM CHLORIDE 10 MEQ: 7.46 INJECTION, SOLUTION INTRAVENOUS at 00:23

## 2025-07-30 RX ADMIN — FERROUS SULFATE TAB 325 MG (65 MG ELEMENTAL FE) 325 MG: 325 (65 FE) TAB at 14:37

## 2025-07-30 RX ADMIN — POTASSIUM CHLORIDE 10 MEQ: 7.46 INJECTION, SOLUTION INTRAVENOUS at 05:27

## 2025-07-30 RX ADMIN — POTASSIUM CHLORIDE 10 MEQ: 7.46 INJECTION, SOLUTION INTRAVENOUS at 06:12

## 2025-07-30 RX ADMIN — POTASSIUM CHLORIDE 10 MEQ: 7.46 INJECTION, SOLUTION INTRAVENOUS at 08:28

## 2025-07-30 RX ADMIN — INSULIN GLARGINE 15 UNITS: 100 INJECTION, SOLUTION SUBCUTANEOUS at 14:38

## 2025-07-30 RX ADMIN — CYANOCOBALAMIN TAB 1000 MCG 500 MCG: 1000 TAB at 14:37

## 2025-07-30 RX ADMIN — POTASSIUM CHLORIDE 10 MEQ: 7.46 INJECTION, SOLUTION INTRAVENOUS at 00:20

## 2025-07-30 RX ADMIN — SODIUM CHLORIDE, PRESERVATIVE FREE 10 ML: 5 INJECTION INTRAVENOUS at 08:46

## 2025-07-30 RX ADMIN — CLOPIDOGREL BISULFATE 75 MG: 75 TABLET, FILM COATED ORAL at 14:38

## 2025-07-30 RX ADMIN — DOCUSATE SODIUM 100 MG: 100 CAPSULE, LIQUID FILLED ORAL at 14:38

## 2025-07-30 RX ADMIN — SODIUM PHOSPHATE, MONOBASIC, MONOHYDRATE AND SODIUM PHOSPHATE, DIBASIC, ANHYDROUS 15 MMOL: 142; 276 INJECTION, SOLUTION INTRAVENOUS at 00:20

## 2025-07-30 RX ADMIN — SODIUM BICARBONATE: 84 INJECTION, SOLUTION INTRAVENOUS at 02:45

## 2025-07-30 RX ADMIN — HEPARIN SODIUM 5000 UNITS: 5000 INJECTION, SOLUTION INTRAVENOUS; SUBCUTANEOUS at 08:46

## 2025-07-30 RX ADMIN — EPOETIN ALFA-EPBX 2000 UNITS: 2000 INJECTION, SOLUTION INTRAVENOUS; SUBCUTANEOUS at 17:43

## 2025-07-30 RX ADMIN — SODIUM CHLORIDE, PRESERVATIVE FREE 10 ML: 5 INJECTION INTRAVENOUS at 20:18

## 2025-07-30 RX ADMIN — DEXTROSE AND SODIUM CHLORIDE: 5; .45 INJECTION, SOLUTION INTRAVENOUS at 08:46

## 2025-07-30 RX ADMIN — HEPARIN SODIUM 5000 UNITS: 5000 INJECTION, SOLUTION INTRAVENOUS; SUBCUTANEOUS at 16:56

## 2025-07-30 RX ADMIN — SODIUM PHOSPHATE, MONOBASIC, MONOHYDRATE AND SODIUM PHOSPHATE, DIBASIC, ANHYDROUS 15 MMOL: 142; 276 INJECTION, SOLUTION INTRAVENOUS at 06:03

## 2025-07-30 RX ADMIN — SODIUM CHLORIDE 4.5 UNITS/HR: 9 INJECTION, SOLUTION INTRAVENOUS at 01:31

## 2025-07-30 ASSESSMENT — PAIN SCALES - GENERAL: PAINLEVEL_OUTOF10: 0

## 2025-07-31 LAB
ALBUMIN SERPL-MCNC: 2.8 G/DL (ref 3.5–5.2)
ALP SERPL-CCNC: 207 U/L (ref 35–104)
ALT SERPL-CCNC: 26 U/L (ref 0–35)
ANION GAP SERPL CALCULATED.3IONS-SCNC: 12 MMOL/L (ref 7–16)
AST SERPL-CCNC: 137 U/L (ref 0–35)
BILIRUB SERPL-MCNC: 0.2 MG/DL (ref 0–1.2)
BUN SERPL-MCNC: 16 MG/DL (ref 6–20)
CALCIUM SERPL-MCNC: 7.7 MG/DL (ref 8.6–10)
CHLORIDE SERPL-SCNC: 99 MMOL/L (ref 98–107)
CO2 SERPL-SCNC: 26 MMOL/L (ref 22–29)
CREAT SERPL-MCNC: 4 MG/DL (ref 0.5–1)
ERYTHROCYTE [DISTWIDTH] IN BLOOD BY AUTOMATED COUNT: 19.7 % (ref 11.5–15)
GFR, ESTIMATED: 14 ML/MIN/1.73M2
GLUCOSE BLD-MCNC: 100 MG/DL (ref 74–99)
GLUCOSE BLD-MCNC: 131 MG/DL (ref 74–99)
GLUCOSE BLD-MCNC: 252 MG/DL (ref 74–99)
GLUCOSE BLD-MCNC: 65 MG/DL (ref 74–99)
GLUCOSE BLD-MCNC: 94 MG/DL (ref 74–99)
GLUCOSE SERPL-MCNC: 80 MG/DL (ref 74–99)
HCT VFR BLD AUTO: 21 % (ref 34–48)
HGB BLD-MCNC: 7 G/DL (ref 11.5–15.5)
LACTATE BLDV-SCNC: 1.2 MMOL/L (ref 0.5–2.2)
LACTATE BLDV-SCNC: 1.3 MMOL/L (ref 0.5–2.2)
LACTATE BLDV-SCNC: 2.5 MMOL/L (ref 0.5–2.2)
MAGNESIUM SERPL-MCNC: 1.7 MG/DL (ref 1.6–2.6)
MCH RBC QN AUTO: 30 PG (ref 26–35)
MCHC RBC AUTO-ENTMCNC: 33.3 G/DL (ref 32–34.5)
MCV RBC AUTO: 90.1 FL (ref 80–99.9)
PHOSPHATE SERPL-MCNC: 3.2 MG/DL (ref 2.5–4.5)
PLATELET # BLD AUTO: 187 K/UL (ref 130–450)
PMV BLD AUTO: 9.1 FL (ref 7–12)
POTASSIUM SERPL-SCNC: 3.7 MMOL/L (ref 3.5–5.1)
PROT SERPL-MCNC: 6.2 G/DL (ref 6.4–8.3)
RBC # BLD AUTO: 2.33 M/UL (ref 3.5–5.5)
SODIUM SERPL-SCNC: 137 MMOL/L (ref 136–145)
WBC OTHER # BLD: 6 K/UL (ref 4.5–11.5)

## 2025-07-31 PROCEDURE — 6370000000 HC RX 637 (ALT 250 FOR IP)

## 2025-07-31 PROCEDURE — 6360000002 HC RX W HCPCS

## 2025-07-31 PROCEDURE — 2500000003 HC RX 250 WO HCPCS

## 2025-07-31 PROCEDURE — 2580000003 HC RX 258

## 2025-07-31 PROCEDURE — 85027 COMPLETE CBC AUTOMATED: CPT

## 2025-07-31 PROCEDURE — 83735 ASSAY OF MAGNESIUM: CPT

## 2025-07-31 PROCEDURE — 82962 GLUCOSE BLOOD TEST: CPT

## 2025-07-31 PROCEDURE — 97166 OT EVAL MOD COMPLEX 45 MIN: CPT

## 2025-07-31 PROCEDURE — 6370000000 HC RX 637 (ALT 250 FOR IP): Performed by: STUDENT IN AN ORGANIZED HEALTH CARE EDUCATION/TRAINING PROGRAM

## 2025-07-31 PROCEDURE — 80053 COMPREHEN METABOLIC PANEL: CPT

## 2025-07-31 PROCEDURE — 97535 SELF CARE MNGMENT TRAINING: CPT

## 2025-07-31 PROCEDURE — 36592 COLLECT BLOOD FROM PICC: CPT

## 2025-07-31 PROCEDURE — 5A1D70Z PERFORMANCE OF URINARY FILTRATION, INTERMITTENT, LESS THAN 6 HOURS PER DAY: ICD-10-PCS | Performed by: INTERNAL MEDICINE

## 2025-07-31 PROCEDURE — 2060000000 HC ICU INTERMEDIATE R&B

## 2025-07-31 PROCEDURE — 2700000000 HC OXYGEN THERAPY PER DAY

## 2025-07-31 PROCEDURE — 97530 THERAPEUTIC ACTIVITIES: CPT

## 2025-07-31 PROCEDURE — 83605 ASSAY OF LACTIC ACID: CPT

## 2025-07-31 PROCEDURE — 97162 PT EVAL MOD COMPLEX 30 MIN: CPT

## 2025-07-31 PROCEDURE — 84100 ASSAY OF PHOSPHORUS: CPT

## 2025-07-31 RX ORDER — INSULIN GLARGINE 100 [IU]/ML
10 INJECTION, SOLUTION SUBCUTANEOUS NIGHTLY
Status: DISCONTINUED | OUTPATIENT
Start: 2025-07-31 | End: 2025-07-31

## 2025-07-31 RX ORDER — INSULIN GLARGINE 100 [IU]/ML
5 INJECTION, SOLUTION SUBCUTANEOUS NIGHTLY
Status: DISCONTINUED | OUTPATIENT
Start: 2025-07-31 | End: 2025-07-31

## 2025-07-31 RX ORDER — INSULIN GLARGINE 100 [IU]/ML
10 INJECTION, SOLUTION SUBCUTANEOUS DAILY
Status: DISCONTINUED | OUTPATIENT
Start: 2025-07-31 | End: 2025-07-31

## 2025-07-31 RX ORDER — INSULIN GLARGINE 100 [IU]/ML
5 INJECTION, SOLUTION SUBCUTANEOUS NIGHTLY
Status: DISCONTINUED | OUTPATIENT
Start: 2025-07-31 | End: 2025-08-05 | Stop reason: HOSPADM

## 2025-07-31 RX ORDER — LABETALOL HYDROCHLORIDE 5 MG/ML
10 INJECTION, SOLUTION INTRAVENOUS EVERY 6 HOURS PRN
Status: DISCONTINUED | OUTPATIENT
Start: 2025-07-31 | End: 2025-08-05 | Stop reason: HOSPADM

## 2025-07-31 RX ORDER — MAGNESIUM SULFATE IN WATER 40 MG/ML
2000 INJECTION, SOLUTION INTRAVENOUS ONCE
Status: COMPLETED | OUTPATIENT
Start: 2025-07-31 | End: 2025-07-31

## 2025-07-31 RX ADMIN — HEPARIN SODIUM 5000 UNITS: 5000 INJECTION, SOLUTION INTRAVENOUS; SUBCUTANEOUS at 16:08

## 2025-07-31 RX ADMIN — MAGNESIUM SULFATE HEPTAHYDRATE 2000 MG: 40 INJECTION, SOLUTION INTRAVENOUS at 08:24

## 2025-07-31 RX ADMIN — HEPARIN SODIUM 5000 UNITS: 5000 INJECTION, SOLUTION INTRAVENOUS; SUBCUTANEOUS at 00:39

## 2025-07-31 RX ADMIN — CLOPIDOGREL BISULFATE 75 MG: 75 TABLET, FILM COATED ORAL at 08:16

## 2025-07-31 RX ADMIN — SODIUM CHLORIDE, PRESERVATIVE FREE 10 ML: 5 INJECTION INTRAVENOUS at 08:17

## 2025-07-31 RX ADMIN — SODIUM PHOSPHATE, MONOBASIC, MONOHYDRATE AND SODIUM PHOSPHATE, DIBASIC, ANHYDROUS 15 MMOL: 142; 276 INJECTION, SOLUTION INTRAVENOUS at 00:47

## 2025-07-31 RX ADMIN — INSULIN LISPRO 2 UNITS: 100 INJECTION, SOLUTION INTRAVENOUS; SUBCUTANEOUS at 17:39

## 2025-07-31 RX ADMIN — LABETALOL HYDROCHLORIDE 10 MG: 5 INJECTION, SOLUTION INTRAVENOUS at 20:44

## 2025-07-31 RX ADMIN — CYANOCOBALAMIN TAB 1000 MCG 500 MCG: 1000 TAB at 08:17

## 2025-07-31 RX ADMIN — SODIUM CHLORIDE, PRESERVATIVE FREE 10 ML: 5 INJECTION INTRAVENOUS at 20:44

## 2025-07-31 RX ADMIN — CETIRIZINE HYDROCHLORIDE 5 MG: 5 TABLET ORAL at 08:39

## 2025-07-31 RX ADMIN — PANTOPRAZOLE SODIUM 40 MG: 40 TABLET, DELAYED RELEASE ORAL at 08:16

## 2025-07-31 RX ADMIN — HEPARIN SODIUM 5000 UNITS: 5000 INJECTION, SOLUTION INTRAVENOUS; SUBCUTANEOUS at 08:16

## 2025-07-31 ASSESSMENT — PAIN SCALES - GENERAL
PAINLEVEL_OUTOF10: 0

## 2025-08-01 PROBLEM — H65.00 ACUTE SEROUS OTITIS MEDIA: Status: ACTIVE | Noted: 2025-08-01

## 2025-08-01 LAB
ALBUMIN SERPL-MCNC: 2.8 G/DL (ref 3.5–5.2)
ALP SERPL-CCNC: 223 U/L (ref 35–104)
ALT SERPL-CCNC: 40 U/L (ref 0–35)
ANION GAP SERPL CALCULATED.3IONS-SCNC: 11 MMOL/L (ref 7–16)
AST SERPL-CCNC: 177 U/L (ref 0–35)
BILIRUB SERPL-MCNC: 0.3 MG/DL (ref 0–1.2)
BUN SERPL-MCNC: 19 MG/DL (ref 6–20)
CALCIUM SERPL-MCNC: 7.9 MG/DL (ref 8.6–10)
CHLORIDE SERPL-SCNC: 98 MMOL/L (ref 98–107)
CO2 SERPL-SCNC: 26 MMOL/L (ref 22–29)
CREAT SERPL-MCNC: 5.5 MG/DL (ref 0.5–1)
EKG ATRIAL RATE: 118 BPM
EKG P AXIS: 49 DEGREES
EKG P-R INTERVAL: 140 MS
EKG Q-T INTERVAL: 342 MS
EKG QRS DURATION: 72 MS
EKG QTC CALCULATION (BAZETT): 479 MS
EKG R AXIS: 10 DEGREES
EKG T AXIS: 182 DEGREES
EKG VENTRICULAR RATE: 118 BPM
ERYTHROCYTE [DISTWIDTH] IN BLOOD BY AUTOMATED COUNT: 19.5 % (ref 11.5–15)
GFR, ESTIMATED: 9 ML/MIN/1.73M2
GLUCOSE BLD-MCNC: 210 MG/DL (ref 74–99)
GLUCOSE BLD-MCNC: 225 MG/DL (ref 74–99)
GLUCOSE BLD-MCNC: 226 MG/DL (ref 74–99)
GLUCOSE BLD-MCNC: 252 MG/DL (ref 74–99)
GLUCOSE BLD-MCNC: 263 MG/DL (ref 74–99)
GLUCOSE SERPL-MCNC: 210 MG/DL (ref 74–99)
HCT VFR BLD AUTO: 21.4 % (ref 34–48)
HGB BLD-MCNC: 7.2 G/DL (ref 11.5–15.5)
MAGNESIUM SERPL-MCNC: 2.2 MG/DL (ref 1.6–2.6)
MCH RBC QN AUTO: 30.5 PG (ref 26–35)
MCHC RBC AUTO-ENTMCNC: 33.6 G/DL (ref 32–34.5)
MCV RBC AUTO: 90.7 FL (ref 80–99.9)
PHOSPHATE SERPL-MCNC: 3.8 MG/DL (ref 2.5–4.5)
PLATELET # BLD AUTO: 174 K/UL (ref 130–450)
PMV BLD AUTO: 9.1 FL (ref 7–12)
POTASSIUM SERPL-SCNC: 4 MMOL/L (ref 3.5–5.1)
PROT SERPL-MCNC: 6.4 G/DL (ref 6.4–8.3)
RBC # BLD AUTO: 2.36 M/UL (ref 3.5–5.5)
SODIUM SERPL-SCNC: 135 MMOL/L (ref 136–145)
WBC OTHER # BLD: 4.1 K/UL (ref 4.5–11.5)

## 2025-08-01 PROCEDURE — 6360000002 HC RX W HCPCS

## 2025-08-01 PROCEDURE — 2060000000 HC ICU INTERMEDIATE R&B

## 2025-08-01 PROCEDURE — 93010 ELECTROCARDIOGRAM REPORT: CPT | Performed by: INTERNAL MEDICINE

## 2025-08-01 PROCEDURE — 2700000000 HC OXYGEN THERAPY PER DAY

## 2025-08-01 PROCEDURE — 2500000003 HC RX 250 WO HCPCS

## 2025-08-01 PROCEDURE — 6370000000 HC RX 637 (ALT 250 FOR IP)

## 2025-08-01 PROCEDURE — 94640 AIRWAY INHALATION TREATMENT: CPT

## 2025-08-01 PROCEDURE — 99252 IP/OBS CONSLTJ NEW/EST SF 35: CPT | Performed by: OTOLARYNGOLOGY

## 2025-08-01 PROCEDURE — 6370000000 HC RX 637 (ALT 250 FOR IP): Performed by: STUDENT IN AN ORGANIZED HEALTH CARE EDUCATION/TRAINING PROGRAM

## 2025-08-01 PROCEDURE — 6360000002 HC RX W HCPCS: Performed by: INTERNAL MEDICINE

## 2025-08-01 PROCEDURE — 90935 HEMODIALYSIS ONE EVALUATION: CPT

## 2025-08-01 PROCEDURE — 99239 HOSP IP/OBS DSCHRG MGMT >30: CPT | Performed by: STUDENT IN AN ORGANIZED HEALTH CARE EDUCATION/TRAINING PROGRAM

## 2025-08-01 PROCEDURE — 6360000002 HC RX W HCPCS: Performed by: STUDENT IN AN ORGANIZED HEALTH CARE EDUCATION/TRAINING PROGRAM

## 2025-08-01 PROCEDURE — 82962 GLUCOSE BLOOD TEST: CPT

## 2025-08-01 PROCEDURE — 80053 COMPREHEN METABOLIC PANEL: CPT

## 2025-08-01 PROCEDURE — 85027 COMPLETE CBC AUTOMATED: CPT

## 2025-08-01 PROCEDURE — 84100 ASSAY OF PHOSPHORUS: CPT

## 2025-08-01 PROCEDURE — 83735 ASSAY OF MAGNESIUM: CPT

## 2025-08-01 RX ORDER — CIPROFLOXACIN AND DEXAMETHASONE 3; 1 MG/ML; MG/ML
4 SUSPENSION/ DROPS AURICULAR (OTIC) 2 TIMES DAILY
Status: DISCONTINUED | OUTPATIENT
Start: 2025-08-01 | End: 2025-08-01 | Stop reason: CLARIF

## 2025-08-01 RX ORDER — CIPROFLOXACIN HYDROCHLORIDE 3.5 MG/ML
4 SOLUTION/ DROPS TOPICAL 2 TIMES DAILY
Status: DISCONTINUED | OUTPATIENT
Start: 2025-08-01 | End: 2025-08-05 | Stop reason: HOSPADM

## 2025-08-01 RX ORDER — PROCHLORPERAZINE EDISYLATE 5 MG/ML
10 INJECTION INTRAMUSCULAR; INTRAVENOUS ONCE
Status: COMPLETED | OUTPATIENT
Start: 2025-08-01 | End: 2025-08-01

## 2025-08-01 RX ORDER — CIPROFLOXACIN HYDROCHLORIDE 3.5 MG/ML
SOLUTION/ DROPS TOPICAL
Qty: 5 ML | Refills: 0 | Status: SHIPPED | OUTPATIENT
Start: 2025-08-01

## 2025-08-01 RX ORDER — DEXAMETHASONE SODIUM PHOSPHATE 1 MG/ML
4 SOLUTION/ DROPS OPHTHALMIC 2 TIMES DAILY
Status: DISCONTINUED | OUTPATIENT
Start: 2025-08-01 | End: 2025-08-05 | Stop reason: HOSPADM

## 2025-08-01 RX ORDER — DEXAMETHASONE SODIUM PHOSPHATE 1 MG/ML
SOLUTION/ DROPS OPHTHALMIC
Qty: 10 ML | Refills: 0 | Status: SHIPPED | OUTPATIENT
Start: 2025-08-01

## 2025-08-01 RX ADMIN — EPOETIN ALFA-EPBX 2000 UNITS: 2000 INJECTION, SOLUTION INTRAVENOUS; SUBCUTANEOUS at 16:22

## 2025-08-01 RX ADMIN — DOCUSATE SODIUM 100 MG: 100 CAPSULE, LIQUID FILLED ORAL at 12:06

## 2025-08-01 RX ADMIN — SODIUM CHLORIDE, PRESERVATIVE FREE 10 ML: 5 INJECTION INTRAVENOUS at 20:19

## 2025-08-01 RX ADMIN — CYANOCOBALAMIN TAB 1000 MCG 500 MCG: 1000 TAB at 12:06

## 2025-08-01 RX ADMIN — DEXAMETHASONE SODIUM PHOSPHATE 4 DROP: 1 SOLUTION/ DROPS OPHTHALMIC at 21:30

## 2025-08-01 RX ADMIN — INSULIN LISPRO 1 UNITS: 100 INJECTION, SOLUTION INTRAVENOUS; SUBCUTANEOUS at 18:16

## 2025-08-01 RX ADMIN — CIPROFLOXACIN HYDROCHLORIDE 4 DROP: 3 SOLUTION/ DROPS OPHTHALMIC at 21:30

## 2025-08-01 RX ADMIN — CETIRIZINE HYDROCHLORIDE 5 MG: 5 TABLET ORAL at 12:06

## 2025-08-01 RX ADMIN — ALBUTEROL SULFATE 2.5 MG: 0.83 SOLUTION RESPIRATORY (INHALATION) at 16:53

## 2025-08-01 RX ADMIN — ONDANSETRON HYDROCHLORIDE 4 MG: 2 SOLUTION INTRAMUSCULAR; INTRAVENOUS at 12:32

## 2025-08-01 RX ADMIN — INSULIN LISPRO 1 UNITS: 100 INJECTION, SOLUTION INTRAVENOUS; SUBCUTANEOUS at 20:18

## 2025-08-01 RX ADMIN — LABETALOL HYDROCHLORIDE 10 MG: 5 INJECTION, SOLUTION INTRAVENOUS at 10:43

## 2025-08-01 RX ADMIN — INSULIN LISPRO 2 UNITS: 100 INJECTION, SOLUTION INTRAVENOUS; SUBCUTANEOUS at 12:32

## 2025-08-01 RX ADMIN — FERROUS SULFATE TAB 325 MG (65 MG ELEMENTAL FE) 325 MG: 325 (65 FE) TAB at 12:06

## 2025-08-01 RX ADMIN — DEXAMETHASONE SODIUM PHOSPHATE 4 DROP: 1 SOLUTION/ DROPS OPHTHALMIC at 16:22

## 2025-08-01 RX ADMIN — CIPROFLOXACIN HYDROCHLORIDE 4 DROP: 3 SOLUTION/ DROPS OPHTHALMIC at 16:22

## 2025-08-01 RX ADMIN — CLOPIDOGREL BISULFATE 75 MG: 75 TABLET, FILM COATED ORAL at 12:06

## 2025-08-01 RX ADMIN — INSULIN GLARGINE 5 UNITS: 100 INJECTION, SOLUTION SUBCUTANEOUS at 20:18

## 2025-08-01 RX ADMIN — HEPARIN SODIUM 5000 UNITS: 5000 INJECTION, SOLUTION INTRAVENOUS; SUBCUTANEOUS at 00:24

## 2025-08-01 RX ADMIN — PROCHLORPERAZINE EDISYLATE 10 MG: 5 INJECTION INTRAMUSCULAR; INTRAVENOUS at 16:22

## 2025-08-01 RX ADMIN — ONDANSETRON HYDROCHLORIDE 4 MG: 2 SOLUTION INTRAMUSCULAR; INTRAVENOUS at 06:46

## 2025-08-01 ASSESSMENT — PAIN SCALES - GENERAL: PAINLEVEL_OUTOF10: 0

## 2025-08-02 LAB
ALBUMIN SERPL-MCNC: 3 G/DL (ref 3.5–5.2)
ALP SERPL-CCNC: 244 U/L (ref 35–104)
ALT SERPL-CCNC: 37 U/L (ref 0–35)
ANION GAP SERPL CALCULATED.3IONS-SCNC: 13 MMOL/L (ref 7–16)
AST SERPL-CCNC: 125 U/L (ref 0–35)
BILIRUB SERPL-MCNC: 0.4 MG/DL (ref 0–1.2)
BUN SERPL-MCNC: 11 MG/DL (ref 6–20)
CALCIUM SERPL-MCNC: 8.2 MG/DL (ref 8.6–10)
CHLORIDE SERPL-SCNC: 97 MMOL/L (ref 98–107)
CO2 SERPL-SCNC: 23 MMOL/L (ref 22–29)
CREAT SERPL-MCNC: 3.6 MG/DL (ref 0.5–1)
ERYTHROCYTE [DISTWIDTH] IN BLOOD BY AUTOMATED COUNT: 19.7 % (ref 11.5–15)
GFR, ESTIMATED: 16 ML/MIN/1.73M2
GLUCOSE BLD-MCNC: 204 MG/DL (ref 74–99)
GLUCOSE BLD-MCNC: 335 MG/DL (ref 74–99)
GLUCOSE BLD-MCNC: 41 MG/DL (ref 74–99)
GLUCOSE BLD-MCNC: 446 MG/DL (ref 74–99)
GLUCOSE BLD-MCNC: 47 MG/DL (ref 74–99)
GLUCOSE BLD-MCNC: 84 MG/DL (ref 74–99)
GLUCOSE SERPL-MCNC: 143 MG/DL (ref 74–99)
HCT VFR BLD AUTO: 25.1 % (ref 34–48)
HGB BLD-MCNC: 8.3 G/DL (ref 11.5–15.5)
MAGNESIUM SERPL-MCNC: 2 MG/DL (ref 1.6–2.6)
MCH RBC QN AUTO: 30.7 PG (ref 26–35)
MCHC RBC AUTO-ENTMCNC: 33.1 G/DL (ref 32–34.5)
MCV RBC AUTO: 93 FL (ref 80–99.9)
PHOSPHATE SERPL-MCNC: 3.8 MG/DL (ref 2.5–4.5)
PLATELET # BLD AUTO: 196 K/UL (ref 130–450)
PMV BLD AUTO: 9.4 FL (ref 7–12)
POTASSIUM SERPL-SCNC: 4.4 MMOL/L (ref 3.5–5.1)
PROT SERPL-MCNC: 7 G/DL (ref 6.4–8.3)
RBC # BLD AUTO: 2.7 M/UL (ref 3.5–5.5)
SODIUM SERPL-SCNC: 133 MMOL/L (ref 136–145)
WBC OTHER # BLD: 4.1 K/UL (ref 4.5–11.5)

## 2025-08-02 PROCEDURE — 85027 COMPLETE CBC AUTOMATED: CPT

## 2025-08-02 PROCEDURE — 2500000003 HC RX 250 WO HCPCS

## 2025-08-02 PROCEDURE — 80053 COMPREHEN METABOLIC PANEL: CPT

## 2025-08-02 PROCEDURE — 94660 CPAP INITIATION&MGMT: CPT

## 2025-08-02 PROCEDURE — 99232 SBSQ HOSP IP/OBS MODERATE 35: CPT | Performed by: STUDENT IN AN ORGANIZED HEALTH CARE EDUCATION/TRAINING PROGRAM

## 2025-08-02 PROCEDURE — 82962 GLUCOSE BLOOD TEST: CPT

## 2025-08-02 PROCEDURE — 2060000000 HC ICU INTERMEDIATE R&B

## 2025-08-02 PROCEDURE — 6370000000 HC RX 637 (ALT 250 FOR IP)

## 2025-08-02 PROCEDURE — 83735 ASSAY OF MAGNESIUM: CPT

## 2025-08-02 PROCEDURE — 2580000003 HC RX 258

## 2025-08-02 PROCEDURE — 2700000000 HC OXYGEN THERAPY PER DAY

## 2025-08-02 PROCEDURE — 6370000000 HC RX 637 (ALT 250 FOR IP): Performed by: STUDENT IN AN ORGANIZED HEALTH CARE EDUCATION/TRAINING PROGRAM

## 2025-08-02 PROCEDURE — 84100 ASSAY OF PHOSPHORUS: CPT

## 2025-08-02 PROCEDURE — 6360000002 HC RX W HCPCS

## 2025-08-02 PROCEDURE — 36415 COLL VENOUS BLD VENIPUNCTURE: CPT

## 2025-08-02 RX ORDER — INSULIN LISPRO 100 [IU]/ML
5 INJECTION, SOLUTION INTRAVENOUS; SUBCUTANEOUS ONCE
Status: COMPLETED | OUTPATIENT
Start: 2025-08-02 | End: 2025-08-02

## 2025-08-02 RX ORDER — ALPRAZOLAM 0.25 MG
0.25 TABLET ORAL ONCE
Status: COMPLETED | OUTPATIENT
Start: 2025-08-02 | End: 2025-08-02

## 2025-08-02 RX ADMIN — SODIUM CHLORIDE, PRESERVATIVE FREE 10 ML: 5 INJECTION INTRAVENOUS at 08:29

## 2025-08-02 RX ADMIN — HEPARIN SODIUM 5000 UNITS: 5000 INJECTION, SOLUTION INTRAVENOUS; SUBCUTANEOUS at 16:17

## 2025-08-02 RX ADMIN — INSULIN LISPRO 3 UNITS: 100 INJECTION, SOLUTION INTRAVENOUS; SUBCUTANEOUS at 22:19

## 2025-08-02 RX ADMIN — DEXAMETHASONE SODIUM PHOSPHATE 4 DROP: 1 SOLUTION/ DROPS OPHTHALMIC at 08:29

## 2025-08-02 RX ADMIN — ALPRAZOLAM 0.25 MG: 0.25 TABLET ORAL at 15:03

## 2025-08-02 RX ADMIN — DOCUSATE SODIUM 100 MG: 100 CAPSULE, LIQUID FILLED ORAL at 08:29

## 2025-08-02 RX ADMIN — INSULIN GLARGINE 5 UNITS: 100 INJECTION, SOLUTION SUBCUTANEOUS at 22:19

## 2025-08-02 RX ADMIN — INSULIN LISPRO 4 UNITS: 100 INJECTION, SOLUTION INTRAVENOUS; SUBCUTANEOUS at 16:16

## 2025-08-02 RX ADMIN — CIPROFLOXACIN HYDROCHLORIDE 4 DROP: 3 SOLUTION/ DROPS OPHTHALMIC at 22:20

## 2025-08-02 RX ADMIN — CETIRIZINE HYDROCHLORIDE 5 MG: 5 TABLET ORAL at 08:29

## 2025-08-02 RX ADMIN — CLOPIDOGREL BISULFATE 75 MG: 75 TABLET, FILM COATED ORAL at 08:29

## 2025-08-02 RX ADMIN — HEPARIN SODIUM 5000 UNITS: 5000 INJECTION, SOLUTION INTRAVENOUS; SUBCUTANEOUS at 08:29

## 2025-08-02 RX ADMIN — DEXAMETHASONE SODIUM PHOSPHATE 4 DROP: 1 SOLUTION/ DROPS OPHTHALMIC at 22:20

## 2025-08-02 RX ADMIN — PANTOPRAZOLE SODIUM 40 MG: 40 TABLET, DELAYED RELEASE ORAL at 07:37

## 2025-08-02 RX ADMIN — DEXTROSE MONOHYDRATE 125 ML: 100 INJECTION, SOLUTION INTRAVENOUS at 06:31

## 2025-08-02 RX ADMIN — INSULIN LISPRO 5 UNITS: 100 INJECTION, SOLUTION INTRAVENOUS; SUBCUTANEOUS at 16:17

## 2025-08-02 RX ADMIN — INSULIN LISPRO 1 UNITS: 100 INJECTION, SOLUTION INTRAVENOUS; SUBCUTANEOUS at 11:24

## 2025-08-02 RX ADMIN — SODIUM CHLORIDE, PRESERVATIVE FREE 10 ML: 5 INJECTION INTRAVENOUS at 22:26

## 2025-08-02 RX ADMIN — CIPROFLOXACIN HYDROCHLORIDE 4 DROP: 3 SOLUTION/ DROPS OPHTHALMIC at 08:28

## 2025-08-02 RX ADMIN — CYANOCOBALAMIN TAB 1000 MCG 500 MCG: 1000 TAB at 08:29

## 2025-08-03 ENCOUNTER — APPOINTMENT (OUTPATIENT)
Dept: GENERAL RADIOLOGY | Age: 40
DRG: 637 | End: 2025-08-03
Payer: MEDICARE

## 2025-08-03 LAB
ALBUMIN SERPL-MCNC: 3.1 G/DL (ref 3.5–5.2)
ALP SERPL-CCNC: 231 U/L (ref 35–104)
ALT SERPL-CCNC: 33 U/L (ref 0–35)
ANION GAP SERPL CALCULATED.3IONS-SCNC: 13 MMOL/L (ref 7–16)
AST SERPL-CCNC: 82 U/L (ref 0–35)
BILIRUB SERPL-MCNC: 0.3 MG/DL (ref 0–1.2)
BNP SERPL-MCNC: ABNORMAL PG/ML (ref 0–125)
BUN SERPL-MCNC: 20 MG/DL (ref 6–20)
CALCIUM SERPL-MCNC: 8.4 MG/DL (ref 8.6–10)
CHLORIDE SERPL-SCNC: 98 MMOL/L (ref 98–107)
CO2 SERPL-SCNC: 24 MMOL/L (ref 22–29)
CREAT SERPL-MCNC: 5.1 MG/DL (ref 0.5–1)
ERYTHROCYTE [DISTWIDTH] IN BLOOD BY AUTOMATED COUNT: 19.4 % (ref 11.5–15)
GFR, ESTIMATED: 10 ML/MIN/1.73M2
GLUCOSE BLD-MCNC: 131 MG/DL (ref 74–99)
GLUCOSE BLD-MCNC: 137 MG/DL (ref 74–99)
GLUCOSE BLD-MCNC: 287 MG/DL (ref 74–99)
GLUCOSE BLD-MCNC: 324 MG/DL (ref 74–99)
GLUCOSE SERPL-MCNC: 129 MG/DL (ref 74–99)
HCT VFR BLD AUTO: 22.6 % (ref 34–48)
HGB BLD-MCNC: 7.6 G/DL (ref 11.5–15.5)
MAGNESIUM SERPL-MCNC: 2 MG/DL (ref 1.6–2.6)
MCH RBC QN AUTO: 30.9 PG (ref 26–35)
MCHC RBC AUTO-ENTMCNC: 33.6 G/DL (ref 32–34.5)
MCV RBC AUTO: 91.9 FL (ref 80–99.9)
MICROORGANISM SPEC CULT: NORMAL
MICROORGANISM SPEC CULT: NORMAL
PHOSPHATE SERPL-MCNC: 4.4 MG/DL (ref 2.5–4.5)
PLATELET # BLD AUTO: 204 K/UL (ref 130–450)
PMV BLD AUTO: 9.8 FL (ref 7–12)
POTASSIUM SERPL-SCNC: 4.4 MMOL/L (ref 3.5–5.1)
PROT SERPL-MCNC: 6.9 G/DL (ref 6.4–8.3)
RBC # BLD AUTO: 2.46 M/UL (ref 3.5–5.5)
SERVICE CMNT-IMP: NORMAL
SERVICE CMNT-IMP: NORMAL
SODIUM SERPL-SCNC: 135 MMOL/L (ref 136–145)
SPECIMEN DESCRIPTION: NORMAL
SPECIMEN DESCRIPTION: NORMAL
WBC OTHER # BLD: 3.9 K/UL (ref 4.5–11.5)

## 2025-08-03 PROCEDURE — 90935 HEMODIALYSIS ONE EVALUATION: CPT

## 2025-08-03 PROCEDURE — 94640 AIRWAY INHALATION TREATMENT: CPT

## 2025-08-03 PROCEDURE — 80053 COMPREHEN METABOLIC PANEL: CPT

## 2025-08-03 PROCEDURE — 36415 COLL VENOUS BLD VENIPUNCTURE: CPT

## 2025-08-03 PROCEDURE — 6360000002 HC RX W HCPCS

## 2025-08-03 PROCEDURE — 2700000000 HC OXYGEN THERAPY PER DAY

## 2025-08-03 PROCEDURE — 6370000000 HC RX 637 (ALT 250 FOR IP)

## 2025-08-03 PROCEDURE — 71045 X-RAY EXAM CHEST 1 VIEW: CPT

## 2025-08-03 PROCEDURE — 6370000000 HC RX 637 (ALT 250 FOR IP): Performed by: STUDENT IN AN ORGANIZED HEALTH CARE EDUCATION/TRAINING PROGRAM

## 2025-08-03 PROCEDURE — 83880 ASSAY OF NATRIURETIC PEPTIDE: CPT

## 2025-08-03 PROCEDURE — 2500000003 HC RX 250 WO HCPCS

## 2025-08-03 PROCEDURE — 85027 COMPLETE CBC AUTOMATED: CPT

## 2025-08-03 PROCEDURE — 82962 GLUCOSE BLOOD TEST: CPT

## 2025-08-03 PROCEDURE — 2060000000 HC ICU INTERMEDIATE R&B

## 2025-08-03 PROCEDURE — 83735 ASSAY OF MAGNESIUM: CPT

## 2025-08-03 PROCEDURE — 84100 ASSAY OF PHOSPHORUS: CPT

## 2025-08-03 RX ORDER — BUSPIRONE HYDROCHLORIDE 5 MG/1
10 TABLET ORAL 3 TIMES DAILY
Status: DISCONTINUED | OUTPATIENT
Start: 2025-08-03 | End: 2025-08-05 | Stop reason: HOSPADM

## 2025-08-03 RX ORDER — ALPRAZOLAM 0.25 MG
0.25 TABLET ORAL NIGHTLY PRN
Status: DISCONTINUED | OUTPATIENT
Start: 2025-08-03 | End: 2025-08-05 | Stop reason: HOSPADM

## 2025-08-03 RX ORDER — INSULIN LISPRO 100 [IU]/ML
3 INJECTION, SOLUTION INTRAVENOUS; SUBCUTANEOUS ONCE
Status: COMPLETED | OUTPATIENT
Start: 2025-08-03 | End: 2025-08-03

## 2025-08-03 RX ADMIN — BUSPIRONE HYDROCHLORIDE 10 MG: 5 TABLET ORAL at 13:45

## 2025-08-03 RX ADMIN — CLOPIDOGREL BISULFATE 75 MG: 75 TABLET, FILM COATED ORAL at 08:14

## 2025-08-03 RX ADMIN — HEPARIN SODIUM 5000 UNITS: 5000 INJECTION, SOLUTION INTRAVENOUS; SUBCUTANEOUS at 08:14

## 2025-08-03 RX ADMIN — CETIRIZINE HYDROCHLORIDE 5 MG: 5 TABLET ORAL at 08:14

## 2025-08-03 RX ADMIN — DEXAMETHASONE SODIUM PHOSPHATE 4 DROP: 1 SOLUTION/ DROPS OPHTHALMIC at 08:14

## 2025-08-03 RX ADMIN — FERROUS SULFATE TAB 325 MG (65 MG ELEMENTAL FE) 325 MG: 325 (65 FE) TAB at 08:13

## 2025-08-03 RX ADMIN — ONDANSETRON HYDROCHLORIDE 4 MG: 2 SOLUTION INTRAMUSCULAR; INTRAVENOUS at 08:17

## 2025-08-03 RX ADMIN — SODIUM CHLORIDE, PRESERVATIVE FREE 10 ML: 5 INJECTION INTRAVENOUS at 21:36

## 2025-08-03 RX ADMIN — INSULIN GLARGINE 5 UNITS: 100 INJECTION, SOLUTION SUBCUTANEOUS at 21:35

## 2025-08-03 RX ADMIN — HEPARIN SODIUM 5000 UNITS: 5000 INJECTION, SOLUTION INTRAVENOUS; SUBCUTANEOUS at 23:56

## 2025-08-03 RX ADMIN — CIPROFLOXACIN HYDROCHLORIDE 4 DROP: 3 SOLUTION/ DROPS OPHTHALMIC at 08:14

## 2025-08-03 RX ADMIN — BUSPIRONE HYDROCHLORIDE 10 MG: 5 TABLET ORAL at 10:37

## 2025-08-03 RX ADMIN — INSULIN LISPRO 2 UNITS: 100 INJECTION, SOLUTION INTRAVENOUS; SUBCUTANEOUS at 21:35

## 2025-08-03 RX ADMIN — CIPROFLOXACIN HYDROCHLORIDE 4 DROP: 3 SOLUTION/ DROPS OPHTHALMIC at 21:35

## 2025-08-03 RX ADMIN — BUSPIRONE HYDROCHLORIDE 10 MG: 5 TABLET ORAL at 21:35

## 2025-08-03 RX ADMIN — DEXAMETHASONE SODIUM PHOSPHATE 4 DROP: 1 SOLUTION/ DROPS OPHTHALMIC at 21:35

## 2025-08-03 RX ADMIN — DOCUSATE SODIUM 100 MG: 100 CAPSULE, LIQUID FILLED ORAL at 08:13

## 2025-08-03 RX ADMIN — SODIUM CHLORIDE, PRESERVATIVE FREE 10 ML: 5 INJECTION INTRAVENOUS at 08:14

## 2025-08-03 RX ADMIN — INSULIN LISPRO 3 UNITS: 100 INJECTION, SOLUTION INTRAVENOUS; SUBCUTANEOUS at 18:14

## 2025-08-03 RX ADMIN — ALBUTEROL SULFATE 2.5 MG: 0.83 SOLUTION RESPIRATORY (INHALATION) at 08:50

## 2025-08-03 RX ADMIN — CYANOCOBALAMIN TAB 1000 MCG 500 MCG: 1000 TAB at 08:14

## 2025-08-03 RX ADMIN — ALPRAZOLAM 0.25 MG: 0.25 TABLET ORAL at 01:02

## 2025-08-03 RX ADMIN — PANTOPRAZOLE SODIUM 40 MG: 40 TABLET, DELAYED RELEASE ORAL at 06:06

## 2025-08-03 RX ADMIN — LABETALOL HYDROCHLORIDE 10 MG: 5 INJECTION, SOLUTION INTRAVENOUS at 12:42

## 2025-08-03 RX ADMIN — HEPARIN SODIUM 5000 UNITS: 5000 INJECTION, SOLUTION INTRAVENOUS; SUBCUTANEOUS at 00:56

## 2025-08-03 ASSESSMENT — PAIN SCALES - GENERAL: PAINLEVEL_OUTOF10: 0

## 2025-08-04 LAB
ALBUMIN SERPL-MCNC: 3.1 G/DL (ref 3.5–5.2)
ALP SERPL-CCNC: 231 U/L (ref 35–104)
ALT SERPL-CCNC: 27 U/L (ref 0–35)
ANION GAP SERPL CALCULATED.3IONS-SCNC: 13 MMOL/L (ref 7–16)
AST SERPL-CCNC: 63 U/L (ref 0–35)
BILIRUB SERPL-MCNC: 0.2 MG/DL (ref 0–1.2)
BUN SERPL-MCNC: 21 MG/DL (ref 6–20)
CALCIUM SERPL-MCNC: 8.3 MG/DL (ref 8.6–10)
CHLORIDE SERPL-SCNC: 100 MMOL/L (ref 98–107)
CO2 SERPL-SCNC: 23 MMOL/L (ref 22–29)
CREAT SERPL-MCNC: 4.6 MG/DL (ref 0.5–1)
ERYTHROCYTE [DISTWIDTH] IN BLOOD BY AUTOMATED COUNT: 19.8 % (ref 11.5–15)
GFR, ESTIMATED: 12 ML/MIN/1.73M2
GLUCOSE BLD-MCNC: 189 MG/DL (ref 74–99)
GLUCOSE BLD-MCNC: 320 MG/DL (ref 74–99)
GLUCOSE BLD-MCNC: 432 MG/DL (ref 74–99)
GLUCOSE SERPL-MCNC: 193 MG/DL (ref 74–99)
HCT VFR BLD AUTO: 24.6 % (ref 34–48)
HGB BLD-MCNC: 7.9 G/DL (ref 11.5–15.5)
MAGNESIUM SERPL-MCNC: 2.1 MG/DL (ref 1.6–2.6)
MCH RBC QN AUTO: 30.9 PG (ref 26–35)
MCHC RBC AUTO-ENTMCNC: 32.1 G/DL (ref 32–34.5)
MCV RBC AUTO: 96.1 FL (ref 80–99.9)
PHOSPHATE SERPL-MCNC: 4.6 MG/DL (ref 2.5–4.5)
PLATELET # BLD AUTO: 208 K/UL (ref 130–450)
PMV BLD AUTO: 9.2 FL (ref 7–12)
POTASSIUM SERPL-SCNC: 4.8 MMOL/L (ref 3.5–5.1)
PROT SERPL-MCNC: 7.1 G/DL (ref 6.4–8.3)
RBC # BLD AUTO: 2.56 M/UL (ref 3.5–5.5)
SODIUM SERPL-SCNC: 136 MMOL/L (ref 136–145)
WBC OTHER # BLD: 4.1 K/UL (ref 4.5–11.5)

## 2025-08-04 PROCEDURE — 97530 THERAPEUTIC ACTIVITIES: CPT

## 2025-08-04 PROCEDURE — 6360000002 HC RX W HCPCS

## 2025-08-04 PROCEDURE — 6370000000 HC RX 637 (ALT 250 FOR IP)

## 2025-08-04 PROCEDURE — 97535 SELF CARE MNGMENT TRAINING: CPT

## 2025-08-04 PROCEDURE — 80053 COMPREHEN METABOLIC PANEL: CPT

## 2025-08-04 PROCEDURE — 2060000000 HC ICU INTERMEDIATE R&B

## 2025-08-04 PROCEDURE — 83735 ASSAY OF MAGNESIUM: CPT

## 2025-08-04 PROCEDURE — 36415 COLL VENOUS BLD VENIPUNCTURE: CPT

## 2025-08-04 PROCEDURE — 6360000002 HC RX W HCPCS: Performed by: INTERNAL MEDICINE

## 2025-08-04 PROCEDURE — 85027 COMPLETE CBC AUTOMATED: CPT

## 2025-08-04 PROCEDURE — 82962 GLUCOSE BLOOD TEST: CPT

## 2025-08-04 PROCEDURE — 90935 HEMODIALYSIS ONE EVALUATION: CPT

## 2025-08-04 PROCEDURE — 6370000000 HC RX 637 (ALT 250 FOR IP): Performed by: STUDENT IN AN ORGANIZED HEALTH CARE EDUCATION/TRAINING PROGRAM

## 2025-08-04 PROCEDURE — 84100 ASSAY OF PHOSPHORUS: CPT

## 2025-08-04 PROCEDURE — 2500000003 HC RX 250 WO HCPCS

## 2025-08-04 RX ORDER — ALPRAZOLAM 0.25 MG
0.25 TABLET ORAL NIGHTLY PRN
Qty: 10 TABLET | Refills: 0 | Status: SHIPPED | OUTPATIENT
Start: 2025-08-04 | End: 2025-08-14

## 2025-08-04 RX ORDER — BUSPIRONE HYDROCHLORIDE 10 MG/1
10 TABLET ORAL 3 TIMES DAILY
Qty: 90 TABLET | Refills: 0 | Status: SHIPPED | OUTPATIENT
Start: 2025-08-04 | End: 2025-09-03

## 2025-08-04 RX ADMIN — INSULIN GLARGINE 5 UNITS: 100 INJECTION, SOLUTION SUBCUTANEOUS at 20:34

## 2025-08-04 RX ADMIN — BUSPIRONE HYDROCHLORIDE 10 MG: 5 TABLET ORAL at 20:33

## 2025-08-04 RX ADMIN — DEXAMETHASONE SODIUM PHOSPHATE 4 DROP: 1 SOLUTION/ DROPS OPHTHALMIC at 20:34

## 2025-08-04 RX ADMIN — HEPARIN SODIUM 5000 UNITS: 5000 INJECTION, SOLUTION INTRAVENOUS; SUBCUTANEOUS at 15:59

## 2025-08-04 RX ADMIN — CETIRIZINE HYDROCHLORIDE 5 MG: 5 TABLET ORAL at 09:01

## 2025-08-04 RX ADMIN — INSULIN LISPRO 1 UNITS: 100 INJECTION, SOLUTION INTRAVENOUS; SUBCUTANEOUS at 06:09

## 2025-08-04 RX ADMIN — CIPROFLOXACIN HYDROCHLORIDE 4 DROP: 3 SOLUTION/ DROPS OPHTHALMIC at 09:01

## 2025-08-04 RX ADMIN — INSULIN LISPRO 3 UNITS: 100 INJECTION, SOLUTION INTRAVENOUS; SUBCUTANEOUS at 16:49

## 2025-08-04 RX ADMIN — CIPROFLOXACIN HYDROCHLORIDE 4 DROP: 3 SOLUTION/ DROPS OPHTHALMIC at 20:34

## 2025-08-04 RX ADMIN — BUSPIRONE HYDROCHLORIDE 10 MG: 5 TABLET ORAL at 15:59

## 2025-08-04 RX ADMIN — CYANOCOBALAMIN TAB 1000 MCG 500 MCG: 1000 TAB at 09:01

## 2025-08-04 RX ADMIN — SODIUM CHLORIDE, PRESERVATIVE FREE 10 ML: 5 INJECTION INTRAVENOUS at 20:33

## 2025-08-04 RX ADMIN — DEXAMETHASONE SODIUM PHOSPHATE 4 DROP: 1 SOLUTION/ DROPS OPHTHALMIC at 09:01

## 2025-08-04 RX ADMIN — EPOETIN ALFA-EPBX 2000 UNITS: 2000 INJECTION, SOLUTION INTRAVENOUS; SUBCUTANEOUS at 16:49

## 2025-08-04 RX ADMIN — PANTOPRAZOLE SODIUM 40 MG: 40 TABLET, DELAYED RELEASE ORAL at 06:09

## 2025-08-04 RX ADMIN — ONDANSETRON HYDROCHLORIDE 4 MG: 2 SOLUTION INTRAMUSCULAR; INTRAVENOUS at 09:11

## 2025-08-04 RX ADMIN — SODIUM CHLORIDE, PRESERVATIVE FREE 10 ML: 5 INJECTION INTRAVENOUS at 09:02

## 2025-08-04 RX ADMIN — HEPARIN SODIUM 5000 UNITS: 5000 INJECTION, SOLUTION INTRAVENOUS; SUBCUTANEOUS at 09:00

## 2025-08-04 RX ADMIN — BUSPIRONE HYDROCHLORIDE 10 MG: 5 TABLET ORAL at 09:01

## 2025-08-04 RX ADMIN — CLOPIDOGREL BISULFATE 75 MG: 75 TABLET, FILM COATED ORAL at 09:02

## 2025-08-04 RX ADMIN — INSULIN LISPRO 4 UNITS: 100 INJECTION, SOLUTION INTRAVENOUS; SUBCUTANEOUS at 20:33

## 2025-08-04 RX ADMIN — LABETALOL HYDROCHLORIDE 10 MG: 5 INJECTION, SOLUTION INTRAVENOUS at 11:53

## 2025-08-04 RX ADMIN — DOCUSATE SODIUM 100 MG: 100 CAPSULE, LIQUID FILLED ORAL at 09:00

## 2025-08-05 ENCOUNTER — APPOINTMENT (OUTPATIENT)
Dept: GENERAL RADIOLOGY | Age: 40
DRG: 638 | End: 2025-08-05
Payer: MEDICARE

## 2025-08-05 ENCOUNTER — HOSPITAL ENCOUNTER (INPATIENT)
Age: 40
LOS: 1 days | Discharge: HOME | DRG: 638 | End: 2025-08-08
Attending: STUDENT IN AN ORGANIZED HEALTH CARE EDUCATION/TRAINING PROGRAM | Admitting: FAMILY MEDICINE
Payer: MEDICARE

## 2025-08-05 ENCOUNTER — APPOINTMENT (OUTPATIENT)
Dept: CT IMAGING | Age: 40
DRG: 638 | End: 2025-08-05
Payer: MEDICARE

## 2025-08-05 VITALS
HEART RATE: 120 BPM | OXYGEN SATURATION: 100 % | DIASTOLIC BLOOD PRESSURE: 67 MMHG | TEMPERATURE: 97.8 F | BODY MASS INDEX: 28.35 KG/M2 | SYSTOLIC BLOOD PRESSURE: 112 MMHG | WEIGHT: 144.4 LBS | RESPIRATION RATE: 16 BRPM | HEIGHT: 60 IN

## 2025-08-05 DIAGNOSIS — J90 BILATERAL PLEURAL EFFUSION: Primary | ICD-10-CM

## 2025-08-05 PROBLEM — N18.6 ESRD (END STAGE RENAL DISEASE) (HCC): Status: ACTIVE | Noted: 2025-08-05

## 2025-08-05 LAB
ALBUMIN SERPL-MCNC: 3.3 G/DL (ref 3.5–5.2)
ALBUMIN SERPL-MCNC: 3.4 G/DL (ref 3.5–5.2)
ALP SERPL-CCNC: 204 U/L (ref 35–104)
ALP SERPL-CCNC: 220 U/L (ref 35–104)
ALT SERPL-CCNC: 25 U/L (ref 0–35)
ALT SERPL-CCNC: 26 U/L (ref 0–35)
ANION GAP SERPL CALCULATED.3IONS-SCNC: 11 MMOL/L (ref 7–16)
ANION GAP SERPL CALCULATED.3IONS-SCNC: 13 MMOL/L (ref 7–16)
AST SERPL-CCNC: 45 U/L (ref 0–35)
AST SERPL-CCNC: 48 U/L (ref 0–35)
BASOPHILS # BLD: 0.03 K/UL (ref 0–0.2)
BASOPHILS NFR BLD: 1 % (ref 0–2)
BILIRUB SERPL-MCNC: 0.2 MG/DL (ref 0–1.2)
BILIRUB SERPL-MCNC: 0.3 MG/DL (ref 0–1.2)
BUN SERPL-MCNC: 19 MG/DL (ref 6–20)
BUN SERPL-MCNC: 20 MG/DL (ref 6–20)
CALCIUM SERPL-MCNC: 8.6 MG/DL (ref 8.6–10)
CALCIUM SERPL-MCNC: 8.9 MG/DL (ref 8.6–10)
CHLORIDE SERPL-SCNC: 96 MMOL/L (ref 98–107)
CHLORIDE SERPL-SCNC: 97 MMOL/L (ref 98–107)
CHP ED QC CHECK: YES
CO2 SERPL-SCNC: 25 MMOL/L (ref 22–29)
CO2 SERPL-SCNC: 26 MMOL/L (ref 22–29)
CREAT SERPL-MCNC: 3.2 MG/DL (ref 0.5–1)
CREAT SERPL-MCNC: 3.7 MG/DL (ref 0.5–1)
EOSINOPHIL # BLD: 0.2 K/UL (ref 0.05–0.5)
EOSINOPHILS RELATIVE PERCENT: 5 % (ref 0–6)
ERYTHROCYTE [DISTWIDTH] IN BLOOD BY AUTOMATED COUNT: 18.6 % (ref 11.5–15)
ERYTHROCYTE [DISTWIDTH] IN BLOOD BY AUTOMATED COUNT: 19 % (ref 11.5–15)
GFR, ESTIMATED: 15 ML/MIN/1.73M2
GFR, ESTIMATED: 18 ML/MIN/1.73M2
GLUCOSE BLD-MCNC: 140 MG/DL (ref 74–99)
GLUCOSE BLD-MCNC: 230 MG/DL (ref 74–99)
GLUCOSE BLD-MCNC: 464 MG/DL (ref 74–99)
GLUCOSE BLD-MCNC: 494 MG/DL
GLUCOSE BLD-MCNC: 494 MG/DL (ref 74–99)
GLUCOSE SERPL-MCNC: 371 MG/DL (ref 74–99)
GLUCOSE SERPL-MCNC: 98 MG/DL (ref 74–99)
HCT VFR BLD AUTO: 23.4 % (ref 34–48)
HCT VFR BLD AUTO: 26.8 % (ref 34–48)
HGB BLD-MCNC: 7.7 G/DL (ref 11.5–15.5)
HGB BLD-MCNC: 8.7 G/DL (ref 11.5–15.5)
IMM GRANULOCYTES # BLD AUTO: <0.03 K/UL (ref 0–0.58)
IMM GRANULOCYTES NFR BLD: 0 % (ref 0–5)
LYMPHOCYTES NFR BLD: 1.01 K/UL (ref 1.5–4)
LYMPHOCYTES RELATIVE PERCENT: 23 % (ref 20–42)
MAGNESIUM SERPL-MCNC: 1.9 MG/DL (ref 1.6–2.6)
MAGNESIUM SERPL-MCNC: 2 MG/DL (ref 1.6–2.6)
MCH RBC QN AUTO: 30.5 PG (ref 26–35)
MCH RBC QN AUTO: 30.7 PG (ref 26–35)
MCHC RBC AUTO-ENTMCNC: 32.5 G/DL (ref 32–34.5)
MCHC RBC AUTO-ENTMCNC: 32.9 G/DL (ref 32–34.5)
MCV RBC AUTO: 93.2 FL (ref 80–99.9)
MCV RBC AUTO: 94 FL (ref 80–99.9)
MONOCYTES NFR BLD: 0.55 K/UL (ref 0.1–0.95)
MONOCYTES NFR BLD: 12 % (ref 2–12)
NEUTROPHILS NFR BLD: 59 % (ref 43–80)
NEUTS SEG NFR BLD: 2.64 K/UL (ref 1.8–7.3)
PHOSPHATE SERPL-MCNC: 3.2 MG/DL (ref 2.5–4.5)
PLATELET # BLD AUTO: 236 K/UL (ref 130–450)
PLATELET # BLD AUTO: 254 K/UL (ref 130–450)
PMV BLD AUTO: 9.3 FL (ref 7–12)
PMV BLD AUTO: 9.4 FL (ref 7–12)
POTASSIUM SERPL-SCNC: 4.4 MMOL/L (ref 3.5–5.1)
POTASSIUM SERPL-SCNC: 4.5 MMOL/L (ref 3.5–5.1)
PROCALCITONIN SERPL-MCNC: 1.47 NG/ML (ref 0–0.08)
PROT SERPL-MCNC: 7 G/DL (ref 6.4–8.3)
PROT SERPL-MCNC: 7.7 G/DL (ref 6.4–8.3)
RBC # BLD AUTO: 2.51 M/UL (ref 3.5–5.5)
RBC # BLD AUTO: 2.85 M/UL (ref 3.5–5.5)
SODIUM SERPL-SCNC: 133 MMOL/L (ref 136–145)
SODIUM SERPL-SCNC: 136 MMOL/L (ref 136–145)
TROPONIN I SERPL HS-MCNC: 193 NG/L (ref 0–14)
TROPONIN I SERPL HS-MCNC: 207 NG/L (ref 0–14)
WBC OTHER # BLD: 4.4 K/UL (ref 4.5–11.5)
WBC OTHER # BLD: 4.5 K/UL (ref 4.5–11.5)

## 2025-08-05 PROCEDURE — 84145 PROCALCITONIN (PCT): CPT

## 2025-08-05 PROCEDURE — 6370000000 HC RX 637 (ALT 250 FOR IP): Performed by: STUDENT IN AN ORGANIZED HEALTH CARE EDUCATION/TRAINING PROGRAM

## 2025-08-05 PROCEDURE — 2580000003 HC RX 258

## 2025-08-05 PROCEDURE — 36415 COLL VENOUS BLD VENIPUNCTURE: CPT

## 2025-08-05 PROCEDURE — 82962 GLUCOSE BLOOD TEST: CPT

## 2025-08-05 PROCEDURE — 84484 ASSAY OF TROPONIN QUANT: CPT

## 2025-08-05 PROCEDURE — 83735 ASSAY OF MAGNESIUM: CPT

## 2025-08-05 PROCEDURE — 99285 EMERGENCY DEPT VISIT HI MDM: CPT

## 2025-08-05 PROCEDURE — 93005 ELECTROCARDIOGRAM TRACING: CPT

## 2025-08-05 PROCEDURE — 71250 CT THORAX DX C-: CPT

## 2025-08-05 PROCEDURE — 6360000002 HC RX W HCPCS

## 2025-08-05 PROCEDURE — 2500000003 HC RX 250 WO HCPCS: Performed by: FAMILY MEDICINE

## 2025-08-05 PROCEDURE — 94640 AIRWAY INHALATION TREATMENT: CPT

## 2025-08-05 PROCEDURE — 71045 X-RAY EXAM CHEST 1 VIEW: CPT

## 2025-08-05 PROCEDURE — 85025 COMPLETE CBC W/AUTO DIFF WBC: CPT

## 2025-08-05 PROCEDURE — G0378 HOSPITAL OBSERVATION PER HR: HCPCS

## 2025-08-05 PROCEDURE — 84100 ASSAY OF PHOSPHORUS: CPT

## 2025-08-05 PROCEDURE — 6370000000 HC RX 637 (ALT 250 FOR IP)

## 2025-08-05 PROCEDURE — 96361 HYDRATE IV INFUSION ADD-ON: CPT

## 2025-08-05 PROCEDURE — 80053 COMPREHEN METABOLIC PANEL: CPT

## 2025-08-05 PROCEDURE — 85027 COMPLETE CBC AUTOMATED: CPT

## 2025-08-05 PROCEDURE — 2700000000 HC OXYGEN THERAPY PER DAY

## 2025-08-05 RX ORDER — INSULIN LISPRO 100 [IU]/ML
10 INJECTION, SOLUTION INTRAVENOUS; SUBCUTANEOUS ONCE
Status: COMPLETED | OUTPATIENT
Start: 2025-08-05 | End: 2025-08-05

## 2025-08-05 RX ORDER — INSULIN LISPRO 100 [IU]/ML
2 INJECTION, SOLUTION INTRAVENOUS; SUBCUTANEOUS ONCE
Status: COMPLETED | OUTPATIENT
Start: 2025-08-05 | End: 2025-08-05

## 2025-08-05 RX ORDER — ACETAMINOPHEN 325 MG/1
650 TABLET ORAL EVERY 6 HOURS PRN
Status: DISCONTINUED | OUTPATIENT
Start: 2025-08-05 | End: 2025-08-08 | Stop reason: HOSPADM

## 2025-08-05 RX ORDER — SODIUM CHLORIDE 9 MG/ML
INJECTION, SOLUTION INTRAVENOUS PRN
Status: DISCONTINUED | OUTPATIENT
Start: 2025-08-05 | End: 2025-08-08 | Stop reason: HOSPADM

## 2025-08-05 RX ORDER — POLYETHYLENE GLYCOL 3350 17 G/17G
17 POWDER, FOR SOLUTION ORAL DAILY PRN
Status: DISCONTINUED | OUTPATIENT
Start: 2025-08-05 | End: 2025-08-08 | Stop reason: HOSPADM

## 2025-08-05 RX ORDER — ACETAMINOPHEN 650 MG/1
650 SUPPOSITORY RECTAL EVERY 6 HOURS PRN
Status: DISCONTINUED | OUTPATIENT
Start: 2025-08-05 | End: 2025-08-08 | Stop reason: HOSPADM

## 2025-08-05 RX ORDER — SODIUM CHLORIDE 0.9 % (FLUSH) 0.9 %
5-40 SYRINGE (ML) INJECTION PRN
Status: DISCONTINUED | OUTPATIENT
Start: 2025-08-05 | End: 2025-08-08 | Stop reason: HOSPADM

## 2025-08-05 RX ORDER — SODIUM CHLORIDE 0.9 % (FLUSH) 0.9 %
5-40 SYRINGE (ML) INJECTION EVERY 12 HOURS SCHEDULED
Status: DISCONTINUED | OUTPATIENT
Start: 2025-08-05 | End: 2025-08-08 | Stop reason: HOSPADM

## 2025-08-05 RX ORDER — ENOXAPARIN SODIUM 100 MG/ML
30 INJECTION SUBCUTANEOUS DAILY
Status: DISCONTINUED | OUTPATIENT
Start: 2025-08-06 | End: 2025-08-08 | Stop reason: HOSPADM

## 2025-08-05 RX ORDER — ONDANSETRON 2 MG/ML
4 INJECTION INTRAMUSCULAR; INTRAVENOUS EVERY 6 HOURS PRN
Status: DISCONTINUED | OUTPATIENT
Start: 2025-08-05 | End: 2025-08-08 | Stop reason: HOSPADM

## 2025-08-05 RX ORDER — 0.9 % SODIUM CHLORIDE 0.9 %
500 INTRAVENOUS SOLUTION INTRAVENOUS ONCE
Status: COMPLETED | OUTPATIENT
Start: 2025-08-05 | End: 2025-08-05

## 2025-08-05 RX ORDER — ONDANSETRON 4 MG/1
4 TABLET, ORALLY DISINTEGRATING ORAL EVERY 8 HOURS PRN
Status: DISCONTINUED | OUTPATIENT
Start: 2025-08-05 | End: 2025-08-08 | Stop reason: HOSPADM

## 2025-08-05 RX ADMIN — CYANOCOBALAMIN TAB 1000 MCG 500 MCG: 1000 TAB at 08:26

## 2025-08-05 RX ADMIN — DOCUSATE SODIUM 100 MG: 100 CAPSULE, LIQUID FILLED ORAL at 08:26

## 2025-08-05 RX ADMIN — INSULIN LISPRO 4 UNITS: 100 INJECTION, SOLUTION INTRAVENOUS; SUBCUTANEOUS at 05:24

## 2025-08-05 RX ADMIN — CETIRIZINE HYDROCHLORIDE 5 MG: 5 TABLET ORAL at 08:25

## 2025-08-05 RX ADMIN — SODIUM CHLORIDE, PRESERVATIVE FREE 10 ML: 5 INJECTION INTRAVENOUS at 23:37

## 2025-08-05 RX ADMIN — DEXAMETHASONE SODIUM PHOSPHATE 4 DROP: 1 SOLUTION/ DROPS OPHTHALMIC at 08:26

## 2025-08-05 RX ADMIN — SODIUM CHLORIDE 500 ML: 0.9 INJECTION, SOLUTION INTRAVENOUS at 14:44

## 2025-08-05 RX ADMIN — INSULIN LISPRO 2 UNITS: 100 INJECTION, SOLUTION INTRAVENOUS; SUBCUTANEOUS at 22:17

## 2025-08-05 RX ADMIN — FERROUS SULFATE TAB 325 MG (65 MG ELEMENTAL FE) 325 MG: 325 (65 FE) TAB at 09:35

## 2025-08-05 RX ADMIN — HEPARIN SODIUM 5000 UNITS: 5000 INJECTION, SOLUTION INTRAVENOUS; SUBCUTANEOUS at 08:25

## 2025-08-05 RX ADMIN — INSULIN LISPRO 10 UNITS: 100 INJECTION, SOLUTION INTRAVENOUS; SUBCUTANEOUS at 06:52

## 2025-08-05 RX ADMIN — ALBUTEROL SULFATE 2.5 MG: 0.83 SOLUTION RESPIRATORY (INHALATION) at 02:19

## 2025-08-05 RX ADMIN — CIPROFLOXACIN HYDROCHLORIDE 4 DROP: 3 SOLUTION/ DROPS OPHTHALMIC at 08:26

## 2025-08-05 RX ADMIN — CLOPIDOGREL BISULFATE 75 MG: 75 TABLET, FILM COATED ORAL at 08:25

## 2025-08-05 RX ADMIN — PANTOPRAZOLE SODIUM 40 MG: 40 TABLET, DELAYED RELEASE ORAL at 05:24

## 2025-08-05 RX ADMIN — BUSPIRONE HYDROCHLORIDE 10 MG: 5 TABLET ORAL at 08:26

## 2025-08-06 PROBLEM — J90 BILATERAL PLEURAL EFFUSION: Status: ACTIVE | Noted: 2025-08-06

## 2025-08-06 PROBLEM — N18.6 ESRD (END STAGE RENAL DISEASE) ON DIALYSIS (HCC): Status: ACTIVE | Noted: 2025-08-06

## 2025-08-06 PROBLEM — Z99.2 ESRD (END STAGE RENAL DISEASE) ON DIALYSIS (HCC): Status: ACTIVE | Noted: 2025-08-06

## 2025-08-06 LAB
GLUCOSE BLD-MCNC: 252 MG/DL (ref 74–99)
GLUCOSE BLD-MCNC: 337 MG/DL (ref 74–99)
GLUCOSE BLD-MCNC: 345 MG/DL (ref 74–99)
GLUCOSE BLD-MCNC: 351 MG/DL (ref 74–99)
TSH SERPL DL<=0.05 MIU/L-ACNC: 3.24 UIU/ML (ref 0.27–4.2)

## 2025-08-06 PROCEDURE — 99222 1ST HOSP IP/OBS MODERATE 55: CPT | Performed by: INTERNAL MEDICINE

## 2025-08-06 PROCEDURE — 6360000002 HC RX W HCPCS: Performed by: STUDENT IN AN ORGANIZED HEALTH CARE EDUCATION/TRAINING PROGRAM

## 2025-08-06 PROCEDURE — 82962 GLUCOSE BLOOD TEST: CPT

## 2025-08-06 PROCEDURE — 90935 HEMODIALYSIS ONE EVALUATION: CPT

## 2025-08-06 PROCEDURE — 6360000002 HC RX W HCPCS: Performed by: FAMILY MEDICINE

## 2025-08-06 PROCEDURE — 99223 1ST HOSP IP/OBS HIGH 75: CPT | Performed by: FAMILY MEDICINE

## 2025-08-06 PROCEDURE — G0378 HOSPITAL OBSERVATION PER HR: HCPCS

## 2025-08-06 PROCEDURE — 96374 THER/PROPH/DIAG INJ IV PUSH: CPT

## 2025-08-06 PROCEDURE — 2T015 HOSPITALIST 2ND TOUCH: CPT | Performed by: STUDENT IN AN ORGANIZED HEALTH CARE EDUCATION/TRAINING PROGRAM

## 2025-08-06 PROCEDURE — 94640 AIRWAY INHALATION TREATMENT: CPT

## 2025-08-06 PROCEDURE — 96375 TX/PRO/DX INJ NEW DRUG ADDON: CPT

## 2025-08-06 PROCEDURE — 2500000003 HC RX 250 WO HCPCS: Performed by: FAMILY MEDICINE

## 2025-08-06 PROCEDURE — 93005 ELECTROCARDIOGRAM TRACING: CPT | Performed by: STUDENT IN AN ORGANIZED HEALTH CARE EDUCATION/TRAINING PROGRAM

## 2025-08-06 PROCEDURE — 96372 THER/PROPH/DIAG INJ SC/IM: CPT

## 2025-08-06 PROCEDURE — 6370000000 HC RX 637 (ALT 250 FOR IP): Performed by: STUDENT IN AN ORGANIZED HEALTH CARE EDUCATION/TRAINING PROGRAM

## 2025-08-06 PROCEDURE — 84443 ASSAY THYROID STIM HORMONE: CPT

## 2025-08-06 PROCEDURE — 6360000002 HC RX W HCPCS: Performed by: INTERNAL MEDICINE

## 2025-08-06 RX ORDER — METOPROLOL TARTRATE 25 MG/1
25 TABLET, FILM COATED ORAL 2 TIMES DAILY
Status: DISCONTINUED | OUTPATIENT
Start: 2025-08-06 | End: 2025-08-06

## 2025-08-06 RX ORDER — GLUCAGON 1 MG/ML
1 KIT INJECTION PRN
Status: DISCONTINUED | OUTPATIENT
Start: 2025-08-06 | End: 2025-08-08 | Stop reason: HOSPADM

## 2025-08-06 RX ORDER — INSULIN LISPRO 100 [IU]/ML
0-4 INJECTION, SOLUTION INTRAVENOUS; SUBCUTANEOUS
Status: DISCONTINUED | OUTPATIENT
Start: 2025-08-06 | End: 2025-08-06

## 2025-08-06 RX ORDER — BUSPIRONE HYDROCHLORIDE 5 MG/1
10 TABLET ORAL 3 TIMES DAILY
Status: DISCONTINUED | OUTPATIENT
Start: 2025-08-06 | End: 2025-08-08 | Stop reason: HOSPADM

## 2025-08-06 RX ORDER — INSULIN GLARGINE 100 [IU]/ML
5 INJECTION, SOLUTION SUBCUTANEOUS EVERY MORNING
Status: DISCONTINUED | OUTPATIENT
Start: 2025-08-06 | End: 2025-08-08 | Stop reason: HOSPADM

## 2025-08-06 RX ORDER — INSULIN LISPRO 100 [IU]/ML
2 INJECTION, SOLUTION INTRAVENOUS; SUBCUTANEOUS
Status: DISCONTINUED | OUTPATIENT
Start: 2025-08-06 | End: 2025-08-06

## 2025-08-06 RX ORDER — INSULIN LISPRO 100 [IU]/ML
3 INJECTION, SOLUTION INTRAVENOUS; SUBCUTANEOUS
Status: DISCONTINUED | OUTPATIENT
Start: 2025-08-07 | End: 2025-08-08 | Stop reason: HOSPADM

## 2025-08-06 RX ORDER — PANTOPRAZOLE SODIUM 40 MG/1
40 TABLET, DELAYED RELEASE ORAL
Status: DISCONTINUED | OUTPATIENT
Start: 2025-08-07 | End: 2025-08-08 | Stop reason: HOSPADM

## 2025-08-06 RX ORDER — METOPROLOL TARTRATE 25 MG/1
25 TABLET, FILM COATED ORAL 2 TIMES DAILY
Status: DISCONTINUED | OUTPATIENT
Start: 2025-08-06 | End: 2025-08-08 | Stop reason: HOSPADM

## 2025-08-06 RX ORDER — DEXTROSE MONOHYDRATE 100 MG/ML
INJECTION, SOLUTION INTRAVENOUS CONTINUOUS PRN
Status: DISCONTINUED | OUTPATIENT
Start: 2025-08-06 | End: 2025-08-08 | Stop reason: HOSPADM

## 2025-08-06 RX ORDER — ALBUTEROL SULFATE 0.83 MG/ML
2.5 SOLUTION RESPIRATORY (INHALATION) 4 TIMES DAILY PRN
Status: DISCONTINUED | OUTPATIENT
Start: 2025-08-06 | End: 2025-08-08 | Stop reason: HOSPADM

## 2025-08-06 RX ORDER — INSULIN LISPRO 100 [IU]/ML
0-4 INJECTION, SOLUTION INTRAVENOUS; SUBCUTANEOUS
Status: DISCONTINUED | OUTPATIENT
Start: 2025-08-06 | End: 2025-08-08 | Stop reason: HOSPADM

## 2025-08-06 RX ORDER — CLOPIDOGREL BISULFATE 75 MG/1
75 TABLET ORAL DAILY
Status: DISCONTINUED | OUTPATIENT
Start: 2025-08-06 | End: 2025-08-08 | Stop reason: HOSPADM

## 2025-08-06 RX ORDER — DEXAMETHASONE SODIUM PHOSPHATE 1 MG/ML
4 SOLUTION/ DROPS OPHTHALMIC 2 TIMES DAILY
Status: DISCONTINUED | OUTPATIENT
Start: 2025-08-06 | End: 2025-08-08 | Stop reason: HOSPADM

## 2025-08-06 RX ORDER — DIPHENHYDRAMINE HYDROCHLORIDE 50 MG/ML
25 INJECTION, SOLUTION INTRAMUSCULAR; INTRAVENOUS
Status: COMPLETED | OUTPATIENT
Start: 2025-08-06 | End: 2025-08-06

## 2025-08-06 RX ORDER — AMMONIUM LACTATE 12 G/100G
LOTION TOPICAL PRN
Status: DISCONTINUED | OUTPATIENT
Start: 2025-08-06 | End: 2025-08-08 | Stop reason: HOSPADM

## 2025-08-06 RX ORDER — INSULIN LISPRO 100 [IU]/ML
6 INJECTION, SOLUTION INTRAVENOUS; SUBCUTANEOUS ONCE
Status: COMPLETED | OUTPATIENT
Start: 2025-08-06 | End: 2025-08-06

## 2025-08-06 RX ORDER — CIPROFLOXACIN HYDROCHLORIDE 3.5 MG/ML
4 SOLUTION/ DROPS TOPICAL 2 TIMES DAILY
Status: DISCONTINUED | OUTPATIENT
Start: 2025-08-06 | End: 2025-08-08 | Stop reason: HOSPADM

## 2025-08-06 RX ADMIN — ENOXAPARIN SODIUM 30 MG: 100 INJECTION SUBCUTANEOUS at 13:20

## 2025-08-06 RX ADMIN — DIPHENHYDRAMINE HYDROCHLORIDE 25 MG: 50 INJECTION, SOLUTION INTRAMUSCULAR; INTRAVENOUS at 09:13

## 2025-08-06 RX ADMIN — BUSPIRONE HYDROCHLORIDE 10 MG: 5 TABLET ORAL at 20:11

## 2025-08-06 RX ADMIN — SODIUM CHLORIDE, PRESERVATIVE FREE 10 ML: 5 INJECTION INTRAVENOUS at 20:02

## 2025-08-06 RX ADMIN — INSULIN GLARGINE 5 UNITS: 100 INJECTION, SOLUTION SUBCUTANEOUS at 13:21

## 2025-08-06 RX ADMIN — CLOPIDOGREL BISULFATE 75 MG: 75 TABLET, FILM COATED ORAL at 13:20

## 2025-08-06 RX ADMIN — INSULIN LISPRO 6 UNITS: 100 INJECTION, SOLUTION INTRAVENOUS; SUBCUTANEOUS at 17:21

## 2025-08-06 RX ADMIN — INSULIN LISPRO 2 UNITS: 100 INJECTION, SOLUTION INTRAVENOUS; SUBCUTANEOUS at 13:20

## 2025-08-06 RX ADMIN — ONDANSETRON HYDROCHLORIDE 4 MG: 2 INJECTION, SOLUTION INTRAMUSCULAR; INTRAVENOUS at 08:41

## 2025-08-06 RX ADMIN — SODIUM CHLORIDE, PRESERVATIVE FREE 10 ML: 5 INJECTION INTRAVENOUS at 13:20

## 2025-08-06 RX ADMIN — INSULIN LISPRO 4 UNITS: 100 INJECTION, SOLUTION INTRAVENOUS; SUBCUTANEOUS at 13:19

## 2025-08-06 RX ADMIN — INSULIN LISPRO 2 UNITS: 100 INJECTION, SOLUTION INTRAVENOUS; SUBCUTANEOUS at 17:21

## 2025-08-06 RX ADMIN — BUSPIRONE HYDROCHLORIDE 10 MG: 5 TABLET ORAL at 13:20

## 2025-08-06 RX ADMIN — METOPROLOL TARTRATE 25 MG: 25 TABLET, FILM COATED ORAL at 17:20

## 2025-08-06 RX ADMIN — DEXAMETHASONE SODIUM PHOSPHATE 4 DROP: 1 SOLUTION/ DROPS OPHTHALMIC at 20:01

## 2025-08-06 RX ADMIN — INSULIN LISPRO 2 UNITS: 100 INJECTION, SOLUTION INTRAVENOUS; SUBCUTANEOUS at 20:08

## 2025-08-06 RX ADMIN — ALBUTEROL SULFATE 2.5 MG: 0.83 SOLUTION RESPIRATORY (INHALATION) at 19:26

## 2025-08-06 RX ADMIN — CIPROFLOXACIN 4 DROP: 3 SOLUTION OPHTHALMIC at 20:01

## 2025-08-06 ASSESSMENT — PAIN SCALES - GENERAL: PAINLEVEL_OUTOF10: 0

## 2025-08-07 LAB
ALBUMIN SERPL-MCNC: 3 G/DL (ref 3.5–5.2)
ALP SERPL-CCNC: 176 U/L (ref 35–104)
ALT SERPL-CCNC: 17 U/L (ref 0–35)
ANION GAP SERPL CALCULATED.3IONS-SCNC: 13 MMOL/L (ref 7–16)
AST SERPL-CCNC: 26 U/L (ref 0–35)
BILIRUB SERPL-MCNC: 0.2 MG/DL (ref 0–1.2)
BUN SERPL-MCNC: 21 MG/DL (ref 6–20)
CALCIUM SERPL-MCNC: 8.5 MG/DL (ref 8.6–10)
CHLORIDE SERPL-SCNC: 96 MMOL/L (ref 98–107)
CO2 SERPL-SCNC: 24 MMOL/L (ref 22–29)
CREAT SERPL-MCNC: 3.6 MG/DL (ref 0.5–1)
CRP SERPL HS-MCNC: 8.4 MG/L (ref 0–5)
EKG ATRIAL RATE: 121 BPM
EKG ATRIAL RATE: 98 BPM
EKG P AXIS: 51 DEGREES
EKG P AXIS: 53 DEGREES
EKG P-R INTERVAL: 116 MS
EKG P-R INTERVAL: 124 MS
EKG Q-T INTERVAL: 316 MS
EKG Q-T INTERVAL: 350 MS
EKG QRS DURATION: 68 MS
EKG QRS DURATION: 70 MS
EKG QTC CALCULATION (BAZETT): 446 MS
EKG QTC CALCULATION (BAZETT): 448 MS
EKG R AXIS: -1 DEGREES
EKG R AXIS: 8 DEGREES
EKG T AXIS: 129 DEGREES
EKG T AXIS: 61 DEGREES
EKG VENTRICULAR RATE: 121 BPM
EKG VENTRICULAR RATE: 98 BPM
ERYTHROCYTE [DISTWIDTH] IN BLOOD BY AUTOMATED COUNT: 18.5 % (ref 11.5–15)
ERYTHROCYTE [SEDIMENTATION RATE] IN BLOOD BY WESTERGREN METHOD: 50 MM/HR (ref 0–20)
GFR, ESTIMATED: 15 ML/MIN/1.73M2
GLUCOSE BLD-MCNC: 182 MG/DL (ref 74–99)
GLUCOSE BLD-MCNC: 212 MG/DL (ref 74–99)
GLUCOSE BLD-MCNC: 228 MG/DL (ref 74–99)
GLUCOSE BLD-MCNC: >500 MG/DL (ref 74–99)
GLUCOSE BLD-MCNC: >500 MG/DL (ref 74–99)
GLUCOSE SERPL-MCNC: 214 MG/DL (ref 74–99)
GLUCOSE SERPL-MCNC: 567 MG/DL (ref 74–99)
HCT VFR BLD AUTO: 22.1 % (ref 34–48)
HGB BLD-MCNC: 7.3 G/DL (ref 11.5–15.5)
MAGNESIUM SERPL-MCNC: 1.9 MG/DL (ref 1.6–2.6)
MCH RBC QN AUTO: 30.8 PG (ref 26–35)
MCHC RBC AUTO-ENTMCNC: 33 G/DL (ref 32–34.5)
MCV RBC AUTO: 93.2 FL (ref 80–99.9)
PHOSPHATE SERPL-MCNC: 3.4 MG/DL (ref 2.5–4.5)
PLATELET # BLD AUTO: 267 K/UL (ref 130–450)
PMV BLD AUTO: 9.5 FL (ref 7–12)
POTASSIUM SERPL-SCNC: 4.2 MMOL/L (ref 3.5–5.1)
PROT SERPL-MCNC: 6.6 G/DL (ref 6.4–8.3)
RBC # BLD AUTO: 2.37 M/UL (ref 3.5–5.5)
SODIUM SERPL-SCNC: 133 MMOL/L (ref 136–145)
WBC OTHER # BLD: 4.5 K/UL (ref 4.5–11.5)

## 2025-08-07 PROCEDURE — 82947 ASSAY GLUCOSE BLOOD QUANT: CPT

## 2025-08-07 PROCEDURE — 82962 GLUCOSE BLOOD TEST: CPT

## 2025-08-07 PROCEDURE — 80053 COMPREHEN METABOLIC PANEL: CPT

## 2025-08-07 PROCEDURE — 36415 COLL VENOUS BLD VENIPUNCTURE: CPT

## 2025-08-07 PROCEDURE — 6370000000 HC RX 637 (ALT 250 FOR IP): Performed by: FAMILY MEDICINE

## 2025-08-07 PROCEDURE — 2500000003 HC RX 250 WO HCPCS: Performed by: FAMILY MEDICINE

## 2025-08-07 PROCEDURE — 90945 DIALYSIS ONE EVALUATION: CPT

## 2025-08-07 PROCEDURE — 84100 ASSAY OF PHOSPHORUS: CPT

## 2025-08-07 PROCEDURE — 5A1D70Z PERFORMANCE OF URINARY FILTRATION, INTERMITTENT, LESS THAN 6 HOURS PER DAY: ICD-10-PCS | Performed by: FAMILY MEDICINE

## 2025-08-07 PROCEDURE — 94640 AIRWAY INHALATION TREATMENT: CPT

## 2025-08-07 PROCEDURE — 86140 C-REACTIVE PROTEIN: CPT

## 2025-08-07 PROCEDURE — 93010 ELECTROCARDIOGRAM REPORT: CPT | Performed by: INTERNAL MEDICINE

## 2025-08-07 PROCEDURE — 83735 ASSAY OF MAGNESIUM: CPT

## 2025-08-07 PROCEDURE — 6360000002 HC RX W HCPCS: Performed by: STUDENT IN AN ORGANIZED HEALTH CARE EDUCATION/TRAINING PROGRAM

## 2025-08-07 PROCEDURE — 85027 COMPLETE CBC AUTOMATED: CPT

## 2025-08-07 PROCEDURE — 85652 RBC SED RATE AUTOMATED: CPT

## 2025-08-07 PROCEDURE — 6370000000 HC RX 637 (ALT 250 FOR IP): Performed by: STUDENT IN AN ORGANIZED HEALTH CARE EDUCATION/TRAINING PROGRAM

## 2025-08-07 PROCEDURE — 90935 HEMODIALYSIS ONE EVALUATION: CPT

## 2025-08-07 PROCEDURE — 99232 SBSQ HOSP IP/OBS MODERATE 35: CPT | Performed by: STUDENT IN AN ORGANIZED HEALTH CARE EDUCATION/TRAINING PROGRAM

## 2025-08-07 PROCEDURE — 6370000000 HC RX 637 (ALT 250 FOR IP): Performed by: INTERNAL MEDICINE

## 2025-08-07 PROCEDURE — 1200000000 HC SEMI PRIVATE

## 2025-08-07 RX ORDER — PEN NEEDLE, DIABETIC 32 GX 1/4"
NEEDLE, DISPOSABLE MISCELLANEOUS
Qty: 250 EACH | Refills: 5 | Status: SHIPPED | OUTPATIENT
Start: 2025-08-07

## 2025-08-07 RX ORDER — METOPROLOL TARTRATE 25 MG/1
25 TABLET, FILM COATED ORAL 2 TIMES DAILY
Qty: 60 TABLET | Refills: 3 | Status: SHIPPED | OUTPATIENT
Start: 2025-08-07 | End: 2025-08-08

## 2025-08-07 RX ORDER — INSULIN GLARGINE 100 [IU]/ML
5 INJECTION, SOLUTION SUBCUTANEOUS EVERY MORNING
Qty: 3 ADJUSTABLE DOSE PRE-FILLED PEN SYRINGE | Refills: 3 | Status: SHIPPED | OUTPATIENT
Start: 2025-08-07

## 2025-08-07 RX ADMIN — SODIUM CHLORIDE, PRESERVATIVE FREE 10 ML: 5 INJECTION INTRAVENOUS at 20:53

## 2025-08-07 RX ADMIN — METOPROLOL TARTRATE 25 MG: 25 TABLET, FILM COATED ORAL at 20:51

## 2025-08-07 RX ADMIN — INSULIN HUMAN 7 UNITS: 100 INJECTION, SOLUTION PARENTERAL at 22:54

## 2025-08-07 RX ADMIN — CIPROFLOXACIN 4 DROP: 3 SOLUTION OPHTHALMIC at 20:51

## 2025-08-07 RX ADMIN — INSULIN GLARGINE 5 UNITS: 100 INJECTION, SOLUTION SUBCUTANEOUS at 08:12

## 2025-08-07 RX ADMIN — INSULIN LISPRO 1 UNITS: 100 INJECTION, SOLUTION INTRAVENOUS; SUBCUTANEOUS at 08:13

## 2025-08-07 RX ADMIN — PANTOPRAZOLE SODIUM 40 MG: 40 TABLET, DELAYED RELEASE ORAL at 05:20

## 2025-08-07 RX ADMIN — CLOPIDOGREL BISULFATE 75 MG: 75 TABLET, FILM COATED ORAL at 13:38

## 2025-08-07 RX ADMIN — METOPROLOL TARTRATE 25 MG: 25 TABLET, FILM COATED ORAL at 08:12

## 2025-08-07 RX ADMIN — BUSPIRONE HYDROCHLORIDE 10 MG: 5 TABLET ORAL at 13:38

## 2025-08-07 RX ADMIN — INSULIN LISPRO 3 UNITS: 100 INJECTION, SOLUTION INTRAVENOUS; SUBCUTANEOUS at 13:37

## 2025-08-07 RX ADMIN — CIPROFLOXACIN 4 DROP: 3 SOLUTION OPHTHALMIC at 13:42

## 2025-08-07 RX ADMIN — INSULIN LISPRO 3 UNITS: 100 INJECTION, SOLUTION INTRAVENOUS; SUBCUTANEOUS at 08:12

## 2025-08-07 RX ADMIN — DEXAMETHASONE SODIUM PHOSPHATE 4 DROP: 1 SOLUTION/ DROPS OPHTHALMIC at 13:42

## 2025-08-07 RX ADMIN — DEXAMETHASONE SODIUM PHOSPHATE 4 DROP: 1 SOLUTION/ DROPS OPHTHALMIC at 20:51

## 2025-08-07 RX ADMIN — ALBUTEROL SULFATE 2.5 MG: 0.83 SOLUTION RESPIRATORY (INHALATION) at 21:38

## 2025-08-07 RX ADMIN — BUSPIRONE HYDROCHLORIDE 10 MG: 5 TABLET ORAL at 20:51

## 2025-08-08 VITALS
HEART RATE: 97 BPM | BODY MASS INDEX: 24.58 KG/M2 | DIASTOLIC BLOOD PRESSURE: 82 MMHG | RESPIRATION RATE: 18 BRPM | OXYGEN SATURATION: 100 % | HEIGHT: 60 IN | SYSTOLIC BLOOD PRESSURE: 101 MMHG | WEIGHT: 125.22 LBS | TEMPERATURE: 97.7 F

## 2025-08-08 LAB
ALBUMIN SERPL-MCNC: 3.4 G/DL (ref 3.5–5.2)
ALP SERPL-CCNC: 196 U/L (ref 35–104)
ALT SERPL-CCNC: 17 U/L (ref 0–35)
ANION GAP SERPL CALCULATED.3IONS-SCNC: 13 MMOL/L (ref 7–16)
AST SERPL-CCNC: 23 U/L (ref 0–35)
BILIRUB SERPL-MCNC: 0.2 MG/DL (ref 0–1.2)
BUN SERPL-MCNC: 17 MG/DL (ref 6–20)
CALCIUM SERPL-MCNC: 8.8 MG/DL (ref 8.6–10)
CHLORIDE SERPL-SCNC: 96 MMOL/L (ref 98–107)
CO2 SERPL-SCNC: 26 MMOL/L (ref 22–29)
CREAT SERPL-MCNC: 3.1 MG/DL (ref 0.5–1)
ERYTHROCYTE [DISTWIDTH] IN BLOOD BY AUTOMATED COUNT: 18.2 % (ref 11.5–15)
GFR, ESTIMATED: 18 ML/MIN/1.73M2
GLUCOSE BLD-MCNC: 101 MG/DL (ref 74–99)
GLUCOSE BLD-MCNC: 117 MG/DL (ref 74–99)
GLUCOSE BLD-MCNC: 187 MG/DL (ref 74–99)
GLUCOSE BLD-MCNC: >500 MG/DL (ref 74–99)
GLUCOSE SERPL-MCNC: 238 MG/DL (ref 74–99)
GLUCOSE SERPL-MCNC: 523 MG/DL (ref 74–99)
HCT VFR BLD AUTO: 25.2 % (ref 34–48)
HGB BLD-MCNC: 8 G/DL (ref 11.5–15.5)
MAGNESIUM SERPL-MCNC: 1.9 MG/DL (ref 1.6–2.6)
MCH RBC QN AUTO: 30.1 PG (ref 26–35)
MCHC RBC AUTO-ENTMCNC: 31.7 G/DL (ref 32–34.5)
MCV RBC AUTO: 94.7 FL (ref 80–99.9)
PHOSPHATE SERPL-MCNC: 2.8 MG/DL (ref 2.5–4.5)
PLATELET # BLD AUTO: 314 K/UL (ref 130–450)
PMV BLD AUTO: 9.8 FL (ref 7–12)
POTASSIUM SERPL-SCNC: 3.5 MMOL/L (ref 3.5–5.1)
PROT SERPL-MCNC: 7.3 G/DL (ref 6.4–8.3)
RBC # BLD AUTO: 2.66 M/UL (ref 3.5–5.5)
SODIUM SERPL-SCNC: 134 MMOL/L (ref 136–145)
WBC OTHER # BLD: 4.7 K/UL (ref 4.5–11.5)

## 2025-08-08 PROCEDURE — 85027 COMPLETE CBC AUTOMATED: CPT

## 2025-08-08 PROCEDURE — 36415 COLL VENOUS BLD VENIPUNCTURE: CPT

## 2025-08-08 PROCEDURE — 80053 COMPREHEN METABOLIC PANEL: CPT

## 2025-08-08 PROCEDURE — 6370000000 HC RX 637 (ALT 250 FOR IP): Performed by: INTERNAL MEDICINE

## 2025-08-08 PROCEDURE — 6370000000 HC RX 637 (ALT 250 FOR IP): Performed by: STUDENT IN AN ORGANIZED HEALTH CARE EDUCATION/TRAINING PROGRAM

## 2025-08-08 PROCEDURE — 82962 GLUCOSE BLOOD TEST: CPT

## 2025-08-08 PROCEDURE — 83735 ASSAY OF MAGNESIUM: CPT

## 2025-08-08 PROCEDURE — 84100 ASSAY OF PHOSPHORUS: CPT

## 2025-08-08 PROCEDURE — 6370000000 HC RX 637 (ALT 250 FOR IP): Performed by: FAMILY MEDICINE

## 2025-08-08 PROCEDURE — 82947 ASSAY GLUCOSE BLOOD QUANT: CPT

## 2025-08-08 PROCEDURE — 99239 HOSP IP/OBS DSCHRG MGMT >30: CPT | Performed by: STUDENT IN AN ORGANIZED HEALTH CARE EDUCATION/TRAINING PROGRAM

## 2025-08-08 PROCEDURE — 99232 SBSQ HOSP IP/OBS MODERATE 35: CPT | Performed by: INTERNAL MEDICINE

## 2025-08-08 PROCEDURE — 90935 HEMODIALYSIS ONE EVALUATION: CPT

## 2025-08-08 RX ORDER — DIPHENHYDRAMINE HCL 25 MG
12.5 TABLET ORAL ONCE
Status: COMPLETED | OUTPATIENT
Start: 2025-08-08 | End: 2025-08-08

## 2025-08-08 RX ORDER — DIPHENHYDRAMINE HYDROCHLORIDE 50 MG/ML
12.5 INJECTION, SOLUTION INTRAMUSCULAR; INTRAVENOUS ONCE
Status: DISCONTINUED | OUTPATIENT
Start: 2025-08-08 | End: 2025-08-08

## 2025-08-08 RX ORDER — INSULIN LISPRO 100 [IU]/ML
10 INJECTION, SOLUTION INTRAVENOUS; SUBCUTANEOUS ONCE
Status: COMPLETED | OUTPATIENT
Start: 2025-08-08 | End: 2025-08-08

## 2025-08-08 RX ORDER — PETROLATUM,WHITE
OINTMENT IN PACKET (GRAM) TOPICAL
Qty: 1 EACH | Refills: 3 | Status: SHIPPED | OUTPATIENT
Start: 2025-08-08

## 2025-08-08 RX ORDER — METOPROLOL TARTRATE 25 MG/1
25 TABLET, FILM COATED ORAL 2 TIMES DAILY
Qty: 180 TABLET | Refills: 3 | Status: SHIPPED | OUTPATIENT
Start: 2025-08-08

## 2025-08-08 RX ORDER — DIPHENHYDRAMINE HCL 12.5MG/5ML
12.5 LIQUID (ML) ORAL 4 TIMES DAILY PRN
Qty: 50 ML | Refills: 0 | Status: SHIPPED | OUTPATIENT
Start: 2025-08-08

## 2025-08-08 RX ADMIN — INSULIN GLARGINE 5 UNITS: 100 INJECTION, SOLUTION SUBCUTANEOUS at 12:57

## 2025-08-08 RX ADMIN — METOPROLOL TARTRATE 25 MG: 25 TABLET, FILM COATED ORAL at 12:57

## 2025-08-08 RX ADMIN — INSULIN LISPRO 1 UNITS: 100 INJECTION, SOLUTION INTRAVENOUS; SUBCUTANEOUS at 06:48

## 2025-08-08 RX ADMIN — INSULIN LISPRO 3 UNITS: 100 INJECTION, SOLUTION INTRAVENOUS; SUBCUTANEOUS at 12:57

## 2025-08-08 RX ADMIN — INSULIN LISPRO 10 UNITS: 100 INJECTION, SOLUTION INTRAVENOUS; SUBCUTANEOUS at 04:13

## 2025-08-08 RX ADMIN — BUSPIRONE HYDROCHLORIDE 10 MG: 5 TABLET ORAL at 12:57

## 2025-08-08 RX ADMIN — DIPHENHYDRAMINE HYDROCHLORIDE 12.5 MG: 25 TABLET ORAL at 10:02

## 2025-08-08 RX ADMIN — CLOPIDOGREL BISULFATE 75 MG: 75 TABLET, FILM COATED ORAL at 12:57

## 2025-08-08 RX ADMIN — INSULIN HUMAN 7 UNITS: 100 INJECTION, SOLUTION PARENTERAL at 04:13

## 2025-08-08 RX ADMIN — PANTOPRAZOLE SODIUM 40 MG: 40 TABLET, DELAYED RELEASE ORAL at 12:57

## 2025-08-11 ENCOUNTER — CARE COORDINATION (OUTPATIENT)
Dept: CARE COORDINATION | Age: 40
End: 2025-08-11

## 2025-08-12 ENCOUNTER — CARE COORDINATION (OUTPATIENT)
Dept: CARE COORDINATION | Age: 40
End: 2025-08-12

## 2025-08-21 ENCOUNTER — CLINICAL DOCUMENTATION (OUTPATIENT)
Age: 40
End: 2025-08-21

## 2025-09-03 DIAGNOSIS — Z99.2 TYPE 1 DIABETES MELLITUS WITH CHRONIC KIDNEY DISEASE ON CHRONIC DIALYSIS (HCC): Primary | ICD-10-CM

## 2025-09-03 DIAGNOSIS — E10.22 TYPE 1 DIABETES MELLITUS WITH CHRONIC KIDNEY DISEASE ON CHRONIC DIALYSIS (HCC): Primary | ICD-10-CM

## 2025-09-03 DIAGNOSIS — N18.6 TYPE 1 DIABETES MELLITUS WITH CHRONIC KIDNEY DISEASE ON CHRONIC DIALYSIS (HCC): Primary | ICD-10-CM

## 2025-09-03 RX ORDER — BLOOD SUGAR DIAGNOSTIC
STRIP MISCELLANEOUS
Qty: 100 EACH | Refills: 3 | Status: SHIPPED | OUTPATIENT
Start: 2025-09-03

## 2025-09-04 RX ORDER — BLOOD SUGAR DIAGNOSTIC
1 STRIP MISCELLANEOUS DAILY
Qty: 200 EACH | Refills: 5 | Status: SHIPPED | OUTPATIENT
Start: 2025-09-04

## (undated) DEVICE — BITEBLOCK 54FR W/ DENT RIM BLOX

## (undated) DEVICE — DEFENDO AIR WATER SUCTION AND BIOPSY VALVE KIT FOR  OLYMPUS: Brand: DEFENDO AIR/WATER/SUCTION AND BIOPSY VALVE

## (undated) DEVICE — SYRINGE MED 10ML LUERLOCK TIP W/O SFTY DISP

## (undated) DEVICE — STANDARD HYPODERMIC NEEDLE,POLYPROPYLENE HUB: Brand: MONOJECT

## (undated) DEVICE — SYRINGE MED 10ML POLYPR LUERSLIP TIP FLAT TOP W/O SFTY DISP

## (undated) DEVICE — JELLY,LUBE,STERILE,FLIP TOP,TUBE,4-OZ: Brand: MEDLINE

## (undated) DEVICE — PACK,UNIV, II AURORA: Brand: MEDLINE

## (undated) DEVICE — Z INACTIVE USE 2641837 CLIP LIG M BLU TI HRT SHP WIRE HORZ 600 PER BX

## (undated) DEVICE — MICROPUNCTURE INTRODUCER SET SILHOUETTE TRANSITIONLESS WITH STAINLESS STEEL WIRE GUIDE: Brand: MICROPUNCTURE

## (undated) DEVICE — DRAPE SURGICAL HAND PROX AURORA

## (undated) DEVICE — DRESSING QUIKCLOT FEMORAL INTERVENTIONAL 1.5X1.5 IN

## (undated) DEVICE — SYRINGE, LUER LOCK, 10ML: Brand: MEDLINE

## (undated) DEVICE — CLAMP EXT FIX DIA8/11MM COMB CLP ON SELF HLD

## (undated) DEVICE — SOLUTION IRRIG 1000ML 0.9% SOD CHL USP POUR PLAS BTL

## (undated) DEVICE — SCREW EXT FIX L80MM DIA4MM THRD DIA3MM S STL SELF DRL BLNT

## (undated) DEVICE — GLOVE ORTHO 8   MSG9480

## (undated) DEVICE — ELECTRODE ES L3IN S STL BLDE INSUL DISP VALLEYLAB EDGE

## (undated) DEVICE — SCREW EXT FIX L150MM DIA5MM THRD L150MM S STL SELF DRL MR

## (undated) DEVICE — PATIENT RETURN ELECTRODE, SINGLE-USE, CONTACT QUALITY MONITORING, ADULT, WITH 9FT CORD, FOR PATIENTS WEIGING OVER 33LBS. (15KG): Brand: MEGADYNE

## (undated) DEVICE — SUTURE VIC +  4-0 27 IN PS1 UD VCP935H

## (undated) DEVICE — SOLUTION IV 500ML 0.9% SOD CHL PH 5 INJ USP VIAFLX PLAS

## (undated) DEVICE — MARKER,SKIN,WI/RULER AND LABELS: Brand: MEDLINE

## (undated) DEVICE — PTA BALLOON DILATATION CATHETER: Brand: MUSTANG™

## (undated) DEVICE — BIT DRL L110MM DIA2.5MM ST G QUIK CPL NONRADIOPAQUE W/O STP

## (undated) DEVICE — LABEL MED 4 IN SURG PANEL W/ PEN STRL

## (undated) DEVICE — PACK PROCEDURE SURG GEN CUST

## (undated) DEVICE — SOLUTION IV IRRIG WATER 1000ML POUR BRL 2F7114

## (undated) DEVICE — GLOVE ORANGE PI 7 1/2   MSG9075

## (undated) DEVICE — CENTURION ULTRAVIT ANTERIOR VITRECTOMY PROBE: Brand: ALCON, CENTURION, ULTRAVIT

## (undated) DEVICE — 3M™ IOBAN™ 2 ANTIMICROBIAL INCISE DRAPE 6640EZ: Brand: IOBAN™ 2

## (undated) DEVICE — LOWER EXT KNEE DRAPE: Brand: MEDLINE INDUSTRIES, INC.

## (undated) DEVICE — GOWN,SIRUS,FABRNF,XL,20/CS: Brand: MEDLINE

## (undated) DEVICE — Device

## (undated) DEVICE — GEL US 20GM NONIRRITATING OVERWRAPPED FILE PCH TRNSMIT

## (undated) DEVICE — DECANTER: Brand: UNBRANDED

## (undated) DEVICE — DRESSING COMP W4XL4IN N ADH PD W2.5XL2.5IN GZ BORDERED ADH

## (undated) DEVICE — SYRINGE MED 10ML TRNSLUC BRL PLUNG BLK MRK POLYPR CTRL

## (undated) DEVICE — INTENDED FOR TISSUE SEPARATION, AND OTHER PROCEDURES THAT REQUIRE A SHARP SURGICAL BLADE TO PUNCTURE OR CUT.: Brand: BARD-PARKER ® STAINLESS STEEL BLADES

## (undated) DEVICE — ELECTRODE PT RET AD L9FT HI MOIST COND ADH HYDRGEL CORDED

## (undated) DEVICE — GLOVE ORANGE PI 8   MSG9080

## (undated) DEVICE — BIT DRL QC 2.5X170 MM 80 MM CALIB STRL

## (undated) DEVICE — FORCEPS BX L160CM JAW DIA2.4MM YEL L CAP W/ NDL DISP RAD

## (undated) DEVICE — GAUZE,SPONGE,4"X4",8PLY,STRL,LF,10/TRAY: Brand: MEDLINE

## (undated) DEVICE — CATHETER IV 18 GAX1.25 IN WNG INTROCAN SAFETY

## (undated) DEVICE — BIT DRL L 115 MM DIA1.8 MM CALIB 30 MM FLUT ADD ON QC NS

## (undated) DEVICE — FORCEPS BX PED L160CM JAW DIA1.8MM WRK CHN 2MM GASTRO YEL

## (undated) DEVICE — TUBING PRSS MON L12IN PVC RIG NONEXPANDING M TO FEM CONN

## (undated) DEVICE — CONTAINER VACUTAINER ANAER CULTURE SWAB

## (undated) DEVICE — DOUBLE BASIN SET: Brand: MEDLINE INDUSTRIES, INC.

## (undated) DEVICE — DRAPE EQUIP CARM 72X42 IN RUBBER BND CLP

## (undated) DEVICE — BLADE CLIPPER GEN PURP NS

## (undated) DEVICE — ENDOSCOPIC KIT 1.1+ OP4 NO CP DE

## (undated) DEVICE — CHECK-FLO PERFORMER INTRODUCER: Brand: PERFORMER

## (undated) DEVICE — PAD,ABDOMINAL,5"X9",ST,LF,25/BX: Brand: MEDLINE INDUSTRIES, INC.

## (undated) DEVICE — CLAMP EXT FIX L PIN 6 POS MAG RESONANCE CONDITIONAL

## (undated) DEVICE — CANNULA NSL CANN NSL L25FT TBNG AD O2 SUP SFT UC

## (undated) DEVICE — SHEATH INTRO 5FR L11CM 0.038IN SIL TRICSP VLV W/ GWIRE

## (undated) DEVICE — CONTAINER SPEC 60ML PH 7NEUTRAL BUFF FRMLN RDY TO USE

## (undated) DEVICE — CANNULA INJ L2.5IN BLNT TIP 3MM CLR BODY W/ 1 W VLV DLP

## (undated) DEVICE — RADIFOCUS GLIDEWIRE: Brand: GLIDEWIRE

## (undated) DEVICE — SCANLAN® VASCU-STATT® SINGLE-USE BULLDOG CLAMP - MIDI ANGLED 45° (WHITE), CLAMPING PRESSURE 25-30 G (2/STERILE PKG): Brand: SCANLAN® VASCU-STATT® SINGLE-USE BULLDOG CLAMP

## (undated) DEVICE — 4-PORT MANIFOLD: Brand: NEPTUNE 2

## (undated) DEVICE — GOWN,SIRUS,FABRNF,L,20/CS: Brand: MEDLINE

## (undated) DEVICE — PACK PROCEDURE SURG RETINAL ST ES CUST

## (undated) DEVICE — STANDARD HYPODERMIC NEEDLE,ALUMINUM HUB: Brand: MONOJECT

## (undated) DEVICE — CLIP INT SM TI EZ LD LIG SYS WECK HORZ

## (undated) DEVICE — CHLORAPREP 26ML ORANGE

## (undated) DEVICE — GLOVE ORANGE PI 7   MSG9070

## (undated) DEVICE — POST EXT FIX DIA11MM 30DEG OUTRIG MAG RESONANCE CONDITIONAL

## (undated) DEVICE — SWABSTICK MEDICATED L4IN BENZ TINC SKIN PREP APLICARE

## (undated) DEVICE — SET INST MINOR PROCEDURE

## (undated) DEVICE — SKIN AFFIX SURG ADHESIVE 72/CS 0.55ML: Brand: MEDLINE

## (undated) DEVICE — AIR/WATER CLEANING ADAPTER FOR OLYMPUS® GI ENDOSCOPE: Brand: BULLDOG®

## (undated) DEVICE — BRACELET ID ALERT FOR PT INFO ISPAN

## (undated) DEVICE — PIN FIX L225MM DIA6MM S STL FOR L EXT FIX SET

## (undated) DEVICE — GUIDEWIRE ORTHOPEDIC 1.6X220 MM TROCAR PT 1 END

## (undated) DEVICE — GOWN,BREATHABLE SLV,AURORA,XLG,STRL: Brand: MEDLINE

## (undated) DEVICE — BIOM OPTIC SET DISPOSABLE, WITH BIOM HD FLEX LENS FOR F=175 MM OR F=200 MM: Brand: BIOM OPTIC SET DISPOSABLE, WITH BIOM HD FLEX LENS FOR F=175 MM OR F=200 MM

## (undated) DEVICE — 1 ML TUBERCULIN SYRINGE,DETACHABLE NEEDLE: Brand: MONOJECT

## (undated) DEVICE — LOOP VES W25MM THK1MM MAXI RED SIL FLD REPELLENT 100 PER

## (undated) DEVICE — 1.5L THIN WALL CAN: Brand: CRD

## (undated) DEVICE — ROD EXT FIX L150MM DIA11MM C FBR MR CONDITIONAL

## (undated) DEVICE — 3M™ TEGADERM™ TRANSPARENT FILM DRESSING FRAME STYLE, 1626W, 4 IN X 4-3/4 IN (10 CM X 12 CM), 50/CT 4CT/CASE: Brand: 3M™ TEGADERM™

## (undated) DEVICE — LEGGINGS: Brand: CONVERTORS

## (undated) DEVICE — MEDIA CONTRAST ISOVUE  300 10X50ML

## (undated) DEVICE — LOOP VES W13MM THK09MM MINI RED SIL FLD REPELLENT

## (undated) DEVICE — PINNACLE INTRODUCER SHEATH: Brand: PINNACLE

## (undated) DEVICE — CLAMP INSERT: Brand: STEALTH® CLAMP INSERT

## (undated) DEVICE — SURGICAL PROCEDURE PACK VASC MAJ CUST

## (undated) DEVICE — DRESSING,GAUZE,XEROFORM,CURAD,5"X9",ST: Brand: CURAD

## (undated) DEVICE — ROD EXT FIX L350MM DIA11MM C FBR MR CONDITIONAL

## (undated) DEVICE — SOLUTION SCRB 4OZ 4% CHG H2O AIDED FOR PREOPERATIVE SKIN

## (undated) DEVICE — 3M™ STERI-DRAPE™ ISOLATION BAG, 10 PER CARTON / 4 CARTONS PER CASE, 1003: Brand: 3M™ STERI-DRAPE™

## (undated) DEVICE — SHIELD EYE W3XL2.5IN UNIV CLR PLAS LTWT

## (undated) DEVICE — SET SURG INSTR MINI VASC

## (undated) DEVICE — LENS MICSCP ENH W FLD BIOM

## (undated) DEVICE — READY WET SKIN SCRUB TRAY-LF: Brand: MEDLINE INDUSTRIES, INC.

## (undated) DEVICE — PAD,EYE,1-5/8X2 5/8,STERILE,LF,1/PK: Brand: MEDLINE

## (undated) DEVICE — CATHETER ETER IV 20GA L1IN POLYUR STR RADPQ INTROCAN SFTY

## (undated) DEVICE — SURGICAL PROCEDURE PACK 25 GA VLV TOT +

## (undated) DEVICE — 18 GA N.G. KIT, 10 PACK: Brand: SITE-RITE

## (undated) DEVICE — DRAPE C ARM W41XL74IN UNIV MOB W RUBBERBAND CLP

## (undated) DEVICE — BIT DRL L160MM DIA2.7MM ST CANN QUIK CPL NONRADIOLUCENT ADJ

## (undated) DEVICE — INFLATION DEVICE KIT: Brand: ENCORE™ 26 ADVANTAGE KIT

## (undated) DEVICE — CONNECTOR IRRIGATION AUXILIARY H2O JET W/ PRT MTL THRD HYDR

## (undated) DEVICE — PADDING,UNDERCAST,COTTON, 4"X4YD STERILE: Brand: MEDLINE

## (undated) DEVICE — CONTROL SYRINGE LUER-LOCK TIP: Brand: MONOJECT

## (undated) DEVICE — SURGICAL PROCEDURE PACK BASIC

## (undated) DEVICE — NEEDLE FLTR 18GA L1.5IN MEM THK5UM BLNT DISP

## (undated) DEVICE — SHEET, T, LAPAROTOMY, STERILE: Brand: MEDLINE

## (undated) DEVICE — ROD EXT FIX L300MM DIA11MM C FBR MR CONDITIONAL

## (undated) DEVICE — TUBING SUCT 12FR MAL ALUM SHFT FN CAP VENT UNIV CONN W/ OBT

## (undated) DEVICE — SWAB SPEC COLL SHFT L5.25IN POLYUR FOAM TIP SFT DBL MEDIA

## (undated) DEVICE — GUIDEWIRE ORTH L150MM DIA1.25MM S STL NTHRD FOR 4MM CANN

## (undated) DEVICE — GOWN,AURORA,BRTHSLV,2XL,18/CS: Brand: MEDLINE

## (undated) DEVICE — STRAP POS MP 30X3 IN HK LOOP CLOSURE FOAM DISP

## (undated) DEVICE — Device: Brand: 25-27G BRITELIGHT™ ANGLED 20° ENDOPROBE® BOX OF 6

## (undated) DEVICE — BANDAGE COMPR W4INXL10YD WHITE/BEIGE E MTRX HK LOOP CLSR

## (undated) DEVICE — TOWEL,OR,DSP,ST,BLUE,STD,6/PK,12PK/CS: Brand: MEDLINE

## (undated) DEVICE — DRAPE MICSCP FULL FLD STRL OCULUS ZEISS

## (undated) DEVICE — CATHETER ETER INTROCAN PERIPH IV 1 IN 20G SFTY SHLD MAT PUR

## (undated) DEVICE — TAPE,WATERPROOF,CURAD,2"X10YD,LF,72/CS: Brand: CURAD

## (undated) DEVICE — LOWER EXT HIP DRAPE: Brand: MEDLINE INDUSTRIES, INC.

## (undated) DEVICE — SOLUTION IV 500ML 0.9% SOD CHL INJ USP CONT EXCEL

## (undated) DEVICE — PADDING CAST W6INXL4YD COT LO LINTING WYTEX

## (undated) DEVICE — TAPE ADH CLTH SILK H2O REPELLENT CURAD

## (undated) DEVICE — MICROPUNCTURE INTRODUCER SET SILHOUETTE TRANSITIONLESS PUSH-PLUS DESIGN - STIFFENED CANNULA WITH STAINLESS STEEL WIRE GUIDE: Brand: MICROPUNCTURE

## (undated) DEVICE — CATHETER HAD ADMIN SET LNG TERM PRECRV W/ SIDE H

## (undated) DEVICE — SOLUTION IV IRRIG POUR BRL 0.9% SODIUM CHL 2F7124

## (undated) DEVICE — CLAMP EXT FIX L TI ALLY COMB CLP ON SELF HLD

## (undated) DEVICE — GUIDEWIRE URO L150CM DIA0.035IN TAPR 3CM NIT HYDRPHLC ANG

## (undated) DEVICE — SOLUTION IRRIG 1000ML H2O PIC PLAS SHATTERPROOF CONTAINER

## (undated) DEVICE — DILATOR ART